# Patient Record
Sex: FEMALE | Race: BLACK OR AFRICAN AMERICAN | NOT HISPANIC OR LATINO
[De-identification: names, ages, dates, MRNs, and addresses within clinical notes are randomized per-mention and may not be internally consistent; named-entity substitution may affect disease eponyms.]

---

## 2017-01-26 ENCOUNTER — APPOINTMENT (OUTPATIENT)
Dept: CARDIOLOGY | Facility: CLINIC | Age: 82
End: 2017-01-26

## 2017-01-26 VITALS — DIASTOLIC BLOOD PRESSURE: 80 MMHG | SYSTOLIC BLOOD PRESSURE: 130 MMHG | HEART RATE: 60 BPM

## 2017-01-26 VITALS — HEIGHT: 63 IN | WEIGHT: 145 LBS | BODY MASS INDEX: 25.69 KG/M2

## 2017-03-04 ENCOUNTER — INPATIENT (INPATIENT)
Facility: HOSPITAL | Age: 82
LOS: 5 days | Discharge: NURSING, REHAB OR LONG-TERM CARE | End: 2017-03-10
Attending: INTERNAL MEDICINE | Admitting: INTERNAL MEDICINE

## 2017-04-24 ENCOUNTER — APPOINTMENT (OUTPATIENT)
Dept: CARDIOLOGY | Facility: CLINIC | Age: 82
End: 2017-04-24

## 2017-04-24 VITALS — DIASTOLIC BLOOD PRESSURE: 80 MMHG | SYSTOLIC BLOOD PRESSURE: 140 MMHG | HEART RATE: 64 BPM

## 2017-04-27 ENCOUNTER — EMERGENCY (EMERGENCY)
Facility: HOSPITAL | Age: 82
LOS: 0 days | Discharge: HOME | End: 2017-04-27
Admitting: INTERNAL MEDICINE

## 2017-06-24 ENCOUNTER — INPATIENT (INPATIENT)
Facility: HOSPITAL | Age: 82
LOS: 2 days | Discharge: HOME | End: 2017-06-27
Attending: INTERNAL MEDICINE | Admitting: INTERNAL MEDICINE

## 2017-06-24 DIAGNOSIS — M06.9 RHEUMATOID ARTHRITIS, UNSPECIFIED: ICD-10-CM

## 2017-06-24 DIAGNOSIS — I50.9 HEART FAILURE, UNSPECIFIED: ICD-10-CM

## 2017-06-24 DIAGNOSIS — N18.4 CHRONIC KIDNEY DISEASE, STAGE 4 (SEVERE): ICD-10-CM

## 2017-06-24 DIAGNOSIS — I10 ESSENTIAL (PRIMARY) HYPERTENSION: ICD-10-CM

## 2017-06-24 DIAGNOSIS — I83.90 ASYMPTOMATIC VARICOSE VEINS OF UNSPECIFIED LOWER EXTREMITY: ICD-10-CM

## 2017-06-24 DIAGNOSIS — R00.1 BRADYCARDIA, UNSPECIFIED: ICD-10-CM

## 2017-06-24 DIAGNOSIS — D64.9 ANEMIA, UNSPECIFIED: ICD-10-CM

## 2017-06-24 DIAGNOSIS — N17.9 ACUTE KIDNEY FAILURE, UNSPECIFIED: ICD-10-CM

## 2017-06-24 DIAGNOSIS — R42 DIZZINESS AND GIDDINESS: ICD-10-CM

## 2017-06-24 DIAGNOSIS — E87.5 HYPERKALEMIA: ICD-10-CM

## 2017-06-28 DIAGNOSIS — M54.5 LOW BACK PAIN: ICD-10-CM

## 2017-06-28 DIAGNOSIS — E78.00 PURE HYPERCHOLESTEROLEMIA, UNSPECIFIED: ICD-10-CM

## 2017-06-28 DIAGNOSIS — Z98.890 OTHER SPECIFIED POSTPROCEDURAL STATES: ICD-10-CM

## 2017-06-28 DIAGNOSIS — Z90.710 ACQUIRED ABSENCE OF BOTH CERVIX AND UTERUS: ICD-10-CM

## 2017-06-28 DIAGNOSIS — Z95.0 PRESENCE OF CARDIAC PACEMAKER: ICD-10-CM

## 2017-06-28 DIAGNOSIS — Z90.49 ACQUIRED ABSENCE OF OTHER SPECIFIED PARTS OF DIGESTIVE TRACT: ICD-10-CM

## 2017-06-28 DIAGNOSIS — M17.0 BILATERAL PRIMARY OSTEOARTHRITIS OF KNEE: ICD-10-CM

## 2017-06-28 DIAGNOSIS — M16.0 BILATERAL PRIMARY OSTEOARTHRITIS OF HIP: ICD-10-CM

## 2017-06-28 DIAGNOSIS — I10 ESSENTIAL (PRIMARY) HYPERTENSION: ICD-10-CM

## 2017-06-28 DIAGNOSIS — G89.29 OTHER CHRONIC PAIN: ICD-10-CM

## 2017-06-28 DIAGNOSIS — K21.9 GASTRO-ESOPHAGEAL REFLUX DISEASE WITHOUT ESOPHAGITIS: ICD-10-CM

## 2017-06-28 DIAGNOSIS — Z90.89 ACQUIRED ABSENCE OF OTHER ORGANS: ICD-10-CM

## 2017-06-28 DIAGNOSIS — Z79.899 OTHER LONG TERM (CURRENT) DRUG THERAPY: ICD-10-CM

## 2017-06-30 DIAGNOSIS — M94.0 CHONDROCOSTAL JUNCTION SYNDROME [TIETZE]: ICD-10-CM

## 2017-06-30 DIAGNOSIS — Z90.49 ACQUIRED ABSENCE OF OTHER SPECIFIED PARTS OF DIGESTIVE TRACT: ICD-10-CM

## 2017-06-30 DIAGNOSIS — Z90.710 ACQUIRED ABSENCE OF BOTH CERVIX AND UTERUS: ICD-10-CM

## 2017-06-30 DIAGNOSIS — I48.91 UNSPECIFIED ATRIAL FIBRILLATION: ICD-10-CM

## 2017-06-30 DIAGNOSIS — I50.22 CHRONIC SYSTOLIC (CONGESTIVE) HEART FAILURE: ICD-10-CM

## 2017-06-30 DIAGNOSIS — R07.89 OTHER CHEST PAIN: ICD-10-CM

## 2017-06-30 DIAGNOSIS — N18.4 CHRONIC KIDNEY DISEASE, STAGE 4 (SEVERE): ICD-10-CM

## 2017-06-30 DIAGNOSIS — M06.9 RHEUMATOID ARTHRITIS, UNSPECIFIED: ICD-10-CM

## 2017-06-30 DIAGNOSIS — E78.5 HYPERLIPIDEMIA, UNSPECIFIED: ICD-10-CM

## 2017-06-30 DIAGNOSIS — I42.8 OTHER CARDIOMYOPATHIES: ICD-10-CM

## 2017-06-30 DIAGNOSIS — E78.00 PURE HYPERCHOLESTEROLEMIA, UNSPECIFIED: ICD-10-CM

## 2017-06-30 DIAGNOSIS — J20.9 ACUTE BRONCHITIS, UNSPECIFIED: ICD-10-CM

## 2017-06-30 DIAGNOSIS — Z95.810 PRESENCE OF AUTOMATIC (IMPLANTABLE) CARDIAC DEFIBRILLATOR: ICD-10-CM

## 2017-06-30 DIAGNOSIS — K21.9 GASTRO-ESOPHAGEAL REFLUX DISEASE WITHOUT ESOPHAGITIS: ICD-10-CM

## 2017-06-30 DIAGNOSIS — F32.9 MAJOR DEPRESSIVE DISORDER, SINGLE EPISODE, UNSPECIFIED: ICD-10-CM

## 2017-06-30 DIAGNOSIS — I13.0 HYPERTENSIVE HEART AND CHRONIC KIDNEY DISEASE WITH HEART FAILURE AND STAGE 1 THROUGH STAGE 4 CHRONIC KIDNEY DISEASE, OR UNSPECIFIED CHRONIC KIDNEY DISEASE: ICD-10-CM

## 2017-08-02 ENCOUNTER — INPATIENT (INPATIENT)
Facility: HOSPITAL | Age: 82
LOS: 2 days | Discharge: HOME | End: 2017-08-05
Attending: INTERNAL MEDICINE | Admitting: INTERNAL MEDICINE

## 2017-08-02 DIAGNOSIS — T82.118A BREAKDOWN (MECHANICAL) OF OTHER CARDIAC ELECTRONIC DEVICE, INITIAL ENCOUNTER: ICD-10-CM

## 2017-08-02 DIAGNOSIS — I50.33 ACUTE ON CHRONIC DIASTOLIC (CONGESTIVE) HEART FAILURE: ICD-10-CM

## 2017-08-02 DIAGNOSIS — R00.1 BRADYCARDIA, UNSPECIFIED: ICD-10-CM

## 2017-08-02 DIAGNOSIS — M06.9 RHEUMATOID ARTHRITIS, UNSPECIFIED: ICD-10-CM

## 2017-08-02 DIAGNOSIS — I13.0 HYPERTENSIVE HEART AND CHRONIC KIDNEY DISEASE WITH HEART FAILURE AND STAGE 1 THROUGH STAGE 4 CHRONIC KIDNEY DISEASE, OR UNSPECIFIED CHRONIC KIDNEY DISEASE: ICD-10-CM

## 2017-08-02 DIAGNOSIS — D64.9 ANEMIA, UNSPECIFIED: ICD-10-CM

## 2017-08-02 DIAGNOSIS — E87.5 HYPERKALEMIA: ICD-10-CM

## 2017-08-02 DIAGNOSIS — R42 DIZZINESS AND GIDDINESS: ICD-10-CM

## 2017-08-02 DIAGNOSIS — I83.90 ASYMPTOMATIC VARICOSE VEINS OF UNSPECIFIED LOWER EXTREMITY: ICD-10-CM

## 2017-08-02 DIAGNOSIS — F32.9 MAJOR DEPRESSIVE DISORDER, SINGLE EPISODE, UNSPECIFIED: ICD-10-CM

## 2017-08-02 DIAGNOSIS — I50.9 HEART FAILURE, UNSPECIFIED: ICD-10-CM

## 2017-08-02 DIAGNOSIS — N18.4 CHRONIC KIDNEY DISEASE, STAGE 4 (SEVERE): ICD-10-CM

## 2017-08-02 DIAGNOSIS — N17.9 ACUTE KIDNEY FAILURE, UNSPECIFIED: ICD-10-CM

## 2017-08-02 DIAGNOSIS — Z90.49 ACQUIRED ABSENCE OF OTHER SPECIFIED PARTS OF DIGESTIVE TRACT: ICD-10-CM

## 2017-08-02 DIAGNOSIS — Z90.710 ACQUIRED ABSENCE OF BOTH CERVIX AND UTERUS: ICD-10-CM

## 2017-08-02 DIAGNOSIS — I10 ESSENTIAL (PRIMARY) HYPERTENSION: ICD-10-CM

## 2017-08-02 DIAGNOSIS — I42.9 CARDIOMYOPATHY, UNSPECIFIED: ICD-10-CM

## 2017-08-02 DIAGNOSIS — M10.9 GOUT, UNSPECIFIED: ICD-10-CM

## 2017-08-02 DIAGNOSIS — E78.5 HYPERLIPIDEMIA, UNSPECIFIED: ICD-10-CM

## 2017-08-09 DIAGNOSIS — Z91.81 HISTORY OF FALLING: ICD-10-CM

## 2017-08-09 DIAGNOSIS — N17.9 ACUTE KIDNEY FAILURE, UNSPECIFIED: ICD-10-CM

## 2017-08-09 DIAGNOSIS — I51.4 MYOCARDITIS, UNSPECIFIED: ICD-10-CM

## 2017-08-09 DIAGNOSIS — K21.9 GASTRO-ESOPHAGEAL REFLUX DISEASE WITHOUT ESOPHAGITIS: ICD-10-CM

## 2017-08-09 DIAGNOSIS — I13.0 HYPERTENSIVE HEART AND CHRONIC KIDNEY DISEASE WITH HEART FAILURE AND STAGE 1 THROUGH STAGE 4 CHRONIC KIDNEY DISEASE, OR UNSPECIFIED CHRONIC KIDNEY DISEASE: ICD-10-CM

## 2017-08-09 DIAGNOSIS — R19.7 DIARRHEA, UNSPECIFIED: ICD-10-CM

## 2017-08-09 DIAGNOSIS — M10.9 GOUT, UNSPECIFIED: ICD-10-CM

## 2017-08-09 DIAGNOSIS — Z90.49 ACQUIRED ABSENCE OF OTHER SPECIFIED PARTS OF DIGESTIVE TRACT: ICD-10-CM

## 2017-08-09 DIAGNOSIS — K80.20 CALCULUS OF GALLBLADDER WITHOUT CHOLECYSTITIS WITHOUT OBSTRUCTION: ICD-10-CM

## 2017-08-09 DIAGNOSIS — J98.11 ATELECTASIS: ICD-10-CM

## 2017-08-09 DIAGNOSIS — Z90.710 ACQUIRED ABSENCE OF BOTH CERVIX AND UTERUS: ICD-10-CM

## 2017-08-09 DIAGNOSIS — I50.9 HEART FAILURE, UNSPECIFIED: ICD-10-CM

## 2017-08-09 DIAGNOSIS — N18.4 CHRONIC KIDNEY DISEASE, STAGE 4 (SEVERE): ICD-10-CM

## 2017-08-09 DIAGNOSIS — E78.00 PURE HYPERCHOLESTEROLEMIA, UNSPECIFIED: ICD-10-CM

## 2017-08-09 DIAGNOSIS — D64.9 ANEMIA, UNSPECIFIED: ICD-10-CM

## 2017-08-09 DIAGNOSIS — Z95.810 PRESENCE OF AUTOMATIC (IMPLANTABLE) CARDIAC DEFIBRILLATOR: ICD-10-CM

## 2017-08-09 DIAGNOSIS — E87.5 HYPERKALEMIA: ICD-10-CM

## 2017-08-09 DIAGNOSIS — E78.5 HYPERLIPIDEMIA, UNSPECIFIED: ICD-10-CM

## 2017-08-09 DIAGNOSIS — M06.9 RHEUMATOID ARTHRITIS, UNSPECIFIED: ICD-10-CM

## 2017-08-09 DIAGNOSIS — I42.9 CARDIOMYOPATHY, UNSPECIFIED: ICD-10-CM

## 2017-10-07 ENCOUNTER — INPATIENT (INPATIENT)
Facility: HOSPITAL | Age: 82
LOS: 3 days | Discharge: HOME | End: 2017-10-11
Attending: INTERNAL MEDICINE | Admitting: INTERNAL MEDICINE

## 2017-10-07 DIAGNOSIS — M06.9 RHEUMATOID ARTHRITIS, UNSPECIFIED: ICD-10-CM

## 2017-10-07 DIAGNOSIS — N18.4 CHRONIC KIDNEY DISEASE, STAGE 4 (SEVERE): ICD-10-CM

## 2017-10-07 DIAGNOSIS — I83.90 ASYMPTOMATIC VARICOSE VEINS OF UNSPECIFIED LOWER EXTREMITY: ICD-10-CM

## 2017-10-07 DIAGNOSIS — E87.5 HYPERKALEMIA: ICD-10-CM

## 2017-10-07 DIAGNOSIS — I50.33 ACUTE ON CHRONIC DIASTOLIC (CONGESTIVE) HEART FAILURE: ICD-10-CM

## 2017-10-07 DIAGNOSIS — R42 DIZZINESS AND GIDDINESS: ICD-10-CM

## 2017-10-07 DIAGNOSIS — N17.9 ACUTE KIDNEY FAILURE, UNSPECIFIED: ICD-10-CM

## 2017-10-07 DIAGNOSIS — R00.1 BRADYCARDIA, UNSPECIFIED: ICD-10-CM

## 2017-10-07 DIAGNOSIS — I50.9 HEART FAILURE, UNSPECIFIED: ICD-10-CM

## 2017-10-07 DIAGNOSIS — D64.9 ANEMIA, UNSPECIFIED: ICD-10-CM

## 2017-10-07 DIAGNOSIS — T82.118A BREAKDOWN (MECHANICAL) OF OTHER CARDIAC ELECTRONIC DEVICE, INITIAL ENCOUNTER: ICD-10-CM

## 2017-10-07 DIAGNOSIS — I10 ESSENTIAL (PRIMARY) HYPERTENSION: ICD-10-CM

## 2017-10-12 ENCOUNTER — APPOINTMENT (OUTPATIENT)
Dept: CARDIOLOGY | Facility: CLINIC | Age: 82
End: 2017-10-12

## 2017-10-12 VITALS — HEIGHT: 63 IN | BODY MASS INDEX: 25.69 KG/M2 | WEIGHT: 145 LBS

## 2017-10-12 DIAGNOSIS — K27.9 PEPTIC ULCER, SITE UNSPECIFIED, UNSPECIFIED AS ACUTE OR CHRONIC, W/OUT HEMORRHAGE OR PERFORATION: ICD-10-CM

## 2017-10-13 DIAGNOSIS — Z95.810 PRESENCE OF AUTOMATIC (IMPLANTABLE) CARDIAC DEFIBRILLATOR: ICD-10-CM

## 2017-10-13 DIAGNOSIS — Z88.8 ALLERGY STATUS TO OTHER DRUGS, MEDICAMENTS AND BIOLOGICAL SUBSTANCES: ICD-10-CM

## 2017-10-13 DIAGNOSIS — Z86.718 PERSONAL HISTORY OF OTHER VENOUS THROMBOSIS AND EMBOLISM: ICD-10-CM

## 2017-10-13 DIAGNOSIS — M06.9 RHEUMATOID ARTHRITIS, UNSPECIFIED: ICD-10-CM

## 2017-10-13 DIAGNOSIS — E78.5 HYPERLIPIDEMIA, UNSPECIFIED: ICD-10-CM

## 2017-10-13 DIAGNOSIS — M10.9 GOUT, UNSPECIFIED: ICD-10-CM

## 2017-10-13 DIAGNOSIS — N18.4 CHRONIC KIDNEY DISEASE, STAGE 4 (SEVERE): ICD-10-CM

## 2017-10-13 DIAGNOSIS — Z79.01 LONG TERM (CURRENT) USE OF ANTICOAGULANTS: ICD-10-CM

## 2017-10-13 DIAGNOSIS — F32.9 MAJOR DEPRESSIVE DISORDER, SINGLE EPISODE, UNSPECIFIED: ICD-10-CM

## 2017-10-13 DIAGNOSIS — I25.10 ATHEROSCLEROTIC HEART DISEASE OF NATIVE CORONARY ARTERY WITHOUT ANGINA PECTORIS: ICD-10-CM

## 2017-10-13 DIAGNOSIS — D64.9 ANEMIA, UNSPECIFIED: ICD-10-CM

## 2017-10-13 DIAGNOSIS — I50.21 ACUTE SYSTOLIC (CONGESTIVE) HEART FAILURE: ICD-10-CM

## 2017-10-13 DIAGNOSIS — I50.23 ACUTE ON CHRONIC SYSTOLIC (CONGESTIVE) HEART FAILURE: ICD-10-CM

## 2017-10-13 DIAGNOSIS — I25.5 ISCHEMIC CARDIOMYOPATHY: ICD-10-CM

## 2017-10-13 DIAGNOSIS — I13.0 HYPERTENSIVE HEART AND CHRONIC KIDNEY DISEASE WITH HEART FAILURE AND STAGE 1 THROUGH STAGE 4 CHRONIC KIDNEY DISEASE, OR UNSPECIFIED CHRONIC KIDNEY DISEASE: ICD-10-CM

## 2017-12-10 ENCOUNTER — INPATIENT (INPATIENT)
Facility: HOSPITAL | Age: 82
LOS: 2 days | Discharge: HOME | End: 2017-12-13
Attending: INTERNAL MEDICINE | Admitting: INTERNAL MEDICINE

## 2017-12-10 DIAGNOSIS — D64.9 ANEMIA, UNSPECIFIED: ICD-10-CM

## 2017-12-10 DIAGNOSIS — I83.90 ASYMPTOMATIC VARICOSE VEINS OF UNSPECIFIED LOWER EXTREMITY: ICD-10-CM

## 2017-12-10 DIAGNOSIS — M94.0 CHONDROCOSTAL JUNCTION SYNDROME [TIETZE]: ICD-10-CM

## 2017-12-10 DIAGNOSIS — I50.33 ACUTE ON CHRONIC DIASTOLIC (CONGESTIVE) HEART FAILURE: ICD-10-CM

## 2017-12-10 DIAGNOSIS — E87.5 HYPERKALEMIA: ICD-10-CM

## 2017-12-10 DIAGNOSIS — R42 DIZZINESS AND GIDDINESS: ICD-10-CM

## 2017-12-10 DIAGNOSIS — N18.4 CHRONIC KIDNEY DISEASE, STAGE 4 (SEVERE): ICD-10-CM

## 2017-12-10 DIAGNOSIS — T82.118A BREAKDOWN (MECHANICAL) OF OTHER CARDIAC ELECTRONIC DEVICE, INITIAL ENCOUNTER: ICD-10-CM

## 2017-12-10 DIAGNOSIS — I10 ESSENTIAL (PRIMARY) HYPERTENSION: ICD-10-CM

## 2017-12-10 DIAGNOSIS — M06.9 RHEUMATOID ARTHRITIS, UNSPECIFIED: ICD-10-CM

## 2017-12-10 DIAGNOSIS — I50.9 HEART FAILURE, UNSPECIFIED: ICD-10-CM

## 2017-12-10 DIAGNOSIS — N17.9 ACUTE KIDNEY FAILURE, UNSPECIFIED: ICD-10-CM

## 2017-12-10 DIAGNOSIS — R00.1 BRADYCARDIA, UNSPECIFIED: ICD-10-CM

## 2017-12-13 ENCOUNTER — APPOINTMENT (OUTPATIENT)
Dept: CARDIOLOGY | Facility: CLINIC | Age: 82
End: 2017-12-13

## 2017-12-19 DIAGNOSIS — Z90.710 ACQUIRED ABSENCE OF BOTH CERVIX AND UTERUS: ICD-10-CM

## 2017-12-19 DIAGNOSIS — F32.9 MAJOR DEPRESSIVE DISORDER, SINGLE EPISODE, UNSPECIFIED: ICD-10-CM

## 2017-12-19 DIAGNOSIS — M10.9 GOUT, UNSPECIFIED: ICD-10-CM

## 2017-12-19 DIAGNOSIS — I42.8 OTHER CARDIOMYOPATHIES: ICD-10-CM

## 2017-12-19 DIAGNOSIS — Z86.718 PERSONAL HISTORY OF OTHER VENOUS THROMBOSIS AND EMBOLISM: ICD-10-CM

## 2017-12-19 DIAGNOSIS — I25.10 ATHEROSCLEROTIC HEART DISEASE OF NATIVE CORONARY ARTERY WITHOUT ANGINA PECTORIS: ICD-10-CM

## 2017-12-19 DIAGNOSIS — D64.9 ANEMIA, UNSPECIFIED: ICD-10-CM

## 2017-12-19 DIAGNOSIS — N18.4 CHRONIC KIDNEY DISEASE, STAGE 4 (SEVERE): ICD-10-CM

## 2017-12-19 DIAGNOSIS — I50.22 CHRONIC SYSTOLIC (CONGESTIVE) HEART FAILURE: ICD-10-CM

## 2017-12-19 DIAGNOSIS — K21.9 GASTRO-ESOPHAGEAL REFLUX DISEASE WITHOUT ESOPHAGITIS: ICD-10-CM

## 2017-12-19 DIAGNOSIS — M06.9 RHEUMATOID ARTHRITIS, UNSPECIFIED: ICD-10-CM

## 2017-12-19 DIAGNOSIS — I13.0 HYPERTENSIVE HEART AND CHRONIC KIDNEY DISEASE WITH HEART FAILURE AND STAGE 1 THROUGH STAGE 4 CHRONIC KIDNEY DISEASE, OR UNSPECIFIED CHRONIC KIDNEY DISEASE: ICD-10-CM

## 2017-12-19 DIAGNOSIS — E78.5 HYPERLIPIDEMIA, UNSPECIFIED: ICD-10-CM

## 2017-12-19 DIAGNOSIS — K57.92 DIVERTICULITIS OF INTESTINE, PART UNSPECIFIED, WITHOUT PERFORATION OR ABSCESS WITHOUT BLEEDING: ICD-10-CM

## 2017-12-19 DIAGNOSIS — Z88.1 ALLERGY STATUS TO OTHER ANTIBIOTIC AGENTS STATUS: ICD-10-CM

## 2017-12-19 DIAGNOSIS — N30.90 CYSTITIS, UNSPECIFIED WITHOUT HEMATURIA: ICD-10-CM

## 2018-01-10 ENCOUNTER — EMERGENCY (EMERGENCY)
Facility: HOSPITAL | Age: 83
LOS: 0 days | Discharge: HOME | End: 2018-01-11
Admitting: INTERNAL MEDICINE

## 2018-01-10 DIAGNOSIS — D64.9 ANEMIA, UNSPECIFIED: ICD-10-CM

## 2018-01-10 DIAGNOSIS — Z90.89 ACQUIRED ABSENCE OF OTHER ORGANS: ICD-10-CM

## 2018-01-10 DIAGNOSIS — I11.9 HYPERTENSIVE HEART DISEASE WITHOUT HEART FAILURE: ICD-10-CM

## 2018-01-10 DIAGNOSIS — M25.562 PAIN IN LEFT KNEE: ICD-10-CM

## 2018-01-10 DIAGNOSIS — Z95.0 PRESENCE OF CARDIAC PACEMAKER: ICD-10-CM

## 2018-01-10 DIAGNOSIS — I50.9 HEART FAILURE, UNSPECIFIED: ICD-10-CM

## 2018-01-10 DIAGNOSIS — E78.00 PURE HYPERCHOLESTEROLEMIA, UNSPECIFIED: ICD-10-CM

## 2018-01-10 DIAGNOSIS — K21.9 GASTRO-ESOPHAGEAL REFLUX DISEASE WITHOUT ESOPHAGITIS: ICD-10-CM

## 2018-01-10 DIAGNOSIS — R42 DIZZINESS AND GIDDINESS: ICD-10-CM

## 2018-01-10 DIAGNOSIS — Z98.890 OTHER SPECIFIED POSTPROCEDURAL STATES: ICD-10-CM

## 2018-01-10 DIAGNOSIS — Z86.718 PERSONAL HISTORY OF OTHER VENOUS THROMBOSIS AND EMBOLISM: ICD-10-CM

## 2018-01-10 DIAGNOSIS — N18.4 CHRONIC KIDNEY DISEASE, STAGE 4 (SEVERE): ICD-10-CM

## 2018-01-10 DIAGNOSIS — T82.118A BREAKDOWN (MECHANICAL) OF OTHER CARDIAC ELECTRONIC DEVICE, INITIAL ENCOUNTER: ICD-10-CM

## 2018-01-10 DIAGNOSIS — I51.9 HEART DISEASE, UNSPECIFIED: ICD-10-CM

## 2018-01-10 DIAGNOSIS — M06.9 RHEUMATOID ARTHRITIS, UNSPECIFIED: ICD-10-CM

## 2018-01-10 DIAGNOSIS — M79.605 PAIN IN LEFT LEG: ICD-10-CM

## 2018-01-10 DIAGNOSIS — Z90.49 ACQUIRED ABSENCE OF OTHER SPECIFIED PARTS OF DIGESTIVE TRACT: ICD-10-CM

## 2018-01-10 DIAGNOSIS — I50.33 ACUTE ON CHRONIC DIASTOLIC (CONGESTIVE) HEART FAILURE: ICD-10-CM

## 2018-01-10 DIAGNOSIS — R00.1 BRADYCARDIA, UNSPECIFIED: ICD-10-CM

## 2018-01-10 DIAGNOSIS — N17.9 ACUTE KIDNEY FAILURE, UNSPECIFIED: ICD-10-CM

## 2018-01-10 DIAGNOSIS — I10 ESSENTIAL (PRIMARY) HYPERTENSION: ICD-10-CM

## 2018-01-10 DIAGNOSIS — Z90.710 ACQUIRED ABSENCE OF BOTH CERVIX AND UTERUS: ICD-10-CM

## 2018-01-10 DIAGNOSIS — E87.5 HYPERKALEMIA: ICD-10-CM

## 2018-01-10 DIAGNOSIS — I83.90 ASYMPTOMATIC VARICOSE VEINS OF UNSPECIFIED LOWER EXTREMITY: ICD-10-CM

## 2018-01-30 ENCOUNTER — EMERGENCY (EMERGENCY)
Facility: HOSPITAL | Age: 83
LOS: 0 days | Discharge: HOME | End: 2018-01-30
Admitting: INTERNAL MEDICINE

## 2018-01-30 DIAGNOSIS — I83.90 ASYMPTOMATIC VARICOSE VEINS OF UNSPECIFIED LOWER EXTREMITY: ICD-10-CM

## 2018-01-30 DIAGNOSIS — R00.1 BRADYCARDIA, UNSPECIFIED: ICD-10-CM

## 2018-01-30 DIAGNOSIS — R51 HEADACHE: ICD-10-CM

## 2018-01-30 DIAGNOSIS — N18.4 CHRONIC KIDNEY DISEASE, STAGE 4 (SEVERE): ICD-10-CM

## 2018-01-30 DIAGNOSIS — E87.5 HYPERKALEMIA: ICD-10-CM

## 2018-01-30 DIAGNOSIS — Z90.710 ACQUIRED ABSENCE OF BOTH CERVIX AND UTERUS: ICD-10-CM

## 2018-01-30 DIAGNOSIS — D64.9 ANEMIA, UNSPECIFIED: ICD-10-CM

## 2018-01-30 DIAGNOSIS — R42 DIZZINESS AND GIDDINESS: ICD-10-CM

## 2018-01-30 DIAGNOSIS — E86.0 DEHYDRATION: ICD-10-CM

## 2018-01-30 DIAGNOSIS — M06.9 RHEUMATOID ARTHRITIS, UNSPECIFIED: ICD-10-CM

## 2018-01-30 DIAGNOSIS — I50.9 HEART FAILURE, UNSPECIFIED: ICD-10-CM

## 2018-01-30 DIAGNOSIS — E78.00 PURE HYPERCHOLESTEROLEMIA, UNSPECIFIED: ICD-10-CM

## 2018-01-30 DIAGNOSIS — Z90.49 ACQUIRED ABSENCE OF OTHER SPECIFIED PARTS OF DIGESTIVE TRACT: ICD-10-CM

## 2018-01-30 DIAGNOSIS — N18.9 CHRONIC KIDNEY DISEASE, UNSPECIFIED: ICD-10-CM

## 2018-01-30 DIAGNOSIS — I50.33 ACUTE ON CHRONIC DIASTOLIC (CONGESTIVE) HEART FAILURE: ICD-10-CM

## 2018-01-30 DIAGNOSIS — R11.0 NAUSEA: ICD-10-CM

## 2018-01-30 DIAGNOSIS — Z95.0 PRESENCE OF CARDIAC PACEMAKER: ICD-10-CM

## 2018-01-30 DIAGNOSIS — I13.0 HYPERTENSIVE HEART AND CHRONIC KIDNEY DISEASE WITH HEART FAILURE AND STAGE 1 THROUGH STAGE 4 CHRONIC KIDNEY DISEASE, OR UNSPECIFIED CHRONIC KIDNEY DISEASE: ICD-10-CM

## 2018-01-30 DIAGNOSIS — I10 ESSENTIAL (PRIMARY) HYPERTENSION: ICD-10-CM

## 2018-01-30 DIAGNOSIS — R53.1 WEAKNESS: ICD-10-CM

## 2018-01-30 DIAGNOSIS — N17.9 ACUTE KIDNEY FAILURE, UNSPECIFIED: ICD-10-CM

## 2018-01-30 DIAGNOSIS — T82.118A BREAKDOWN (MECHANICAL) OF OTHER CARDIAC ELECTRONIC DEVICE, INITIAL ENCOUNTER: ICD-10-CM

## 2018-05-21 ENCOUNTER — APPOINTMENT (OUTPATIENT)
Dept: CARDIOLOGY | Facility: CLINIC | Age: 83
End: 2018-05-21

## 2018-05-21 VITALS
WEIGHT: 134 LBS | HEIGHT: 63 IN | BODY MASS INDEX: 23.74 KG/M2 | SYSTOLIC BLOOD PRESSURE: 160 MMHG | HEART RATE: 59 BPM | DIASTOLIC BLOOD PRESSURE: 80 MMHG

## 2018-05-21 RX ORDER — ISOSORBIDE DINITRATE 20 MG/1
20 TABLET ORAL
Refills: 0 | Status: DISCONTINUED | COMMUNITY
End: 2018-05-21

## 2018-05-21 RX ORDER — LIDOCAINE 5 G/100G
5 OINTMENT TOPICAL
Qty: 354 | Refills: 0 | Status: DISCONTINUED | COMMUNITY
Start: 2016-12-21 | End: 2018-05-21

## 2018-05-23 ENCOUNTER — MEDICATION RENEWAL (OUTPATIENT)
Age: 83
End: 2018-05-23

## 2018-05-23 ENCOUNTER — RX RENEWAL (OUTPATIENT)
Age: 83
End: 2018-05-23

## 2018-06-07 ENCOUNTER — APPOINTMENT (OUTPATIENT)
Dept: CARDIOLOGY | Facility: CLINIC | Age: 83
End: 2018-06-07

## 2018-07-17 ENCOUNTER — APPOINTMENT (OUTPATIENT)
Dept: CARDIOLOGY | Facility: CLINIC | Age: 83
End: 2018-07-17

## 2018-07-17 VITALS
WEIGHT: 139 LBS | HEIGHT: 63 IN | DIASTOLIC BLOOD PRESSURE: 80 MMHG | SYSTOLIC BLOOD PRESSURE: 140 MMHG | HEART RATE: 60 BPM | BODY MASS INDEX: 24.63 KG/M2

## 2018-07-30 ENCOUNTER — RX RENEWAL (OUTPATIENT)
Age: 83
End: 2018-07-30

## 2018-07-30 ENCOUNTER — RECORD ABSTRACTING (OUTPATIENT)
Age: 83
End: 2018-07-30

## 2018-07-30 ENCOUNTER — APPOINTMENT (OUTPATIENT)
Dept: VASCULAR SURGERY | Facility: CLINIC | Age: 83
End: 2018-07-30
Payer: MEDICARE

## 2018-07-30 VITALS
DIASTOLIC BLOOD PRESSURE: 80 MMHG | WEIGHT: 149 LBS | HEIGHT: 63 IN | SYSTOLIC BLOOD PRESSURE: 130 MMHG | BODY MASS INDEX: 26.4 KG/M2

## 2018-07-30 PROCEDURE — 93970 EXTREMITY STUDY: CPT

## 2018-07-30 PROCEDURE — 99203 OFFICE O/P NEW LOW 30 MIN: CPT

## 2018-09-16 ENCOUNTER — EMERGENCY (EMERGENCY)
Facility: HOSPITAL | Age: 83
LOS: 0 days | Discharge: HOME | End: 2018-09-16
Attending: EMERGENCY MEDICINE | Admitting: STUDENT IN AN ORGANIZED HEALTH CARE EDUCATION/TRAINING PROGRAM

## 2018-09-16 VITALS
SYSTOLIC BLOOD PRESSURE: 122 MMHG | OXYGEN SATURATION: 99 % | DIASTOLIC BLOOD PRESSURE: 67 MMHG | TEMPERATURE: 99 F | HEART RATE: 60 BPM | RESPIRATION RATE: 18 BRPM

## 2018-09-16 VITALS
OXYGEN SATURATION: 98 % | SYSTOLIC BLOOD PRESSURE: 139 MMHG | RESPIRATION RATE: 18 BRPM | DIASTOLIC BLOOD PRESSURE: 70 MMHG | HEART RATE: 60 BPM | TEMPERATURE: 98 F

## 2018-09-16 DIAGNOSIS — N18.3 CHRONIC KIDNEY DISEASE, STAGE 3 (MODERATE): ICD-10-CM

## 2018-09-16 DIAGNOSIS — R00.2 PALPITATIONS: ICD-10-CM

## 2018-09-16 DIAGNOSIS — N39.0 URINARY TRACT INFECTION, SITE NOT SPECIFIED: ICD-10-CM

## 2018-09-16 DIAGNOSIS — R10.9 UNSPECIFIED ABDOMINAL PAIN: ICD-10-CM

## 2018-09-16 DIAGNOSIS — Z95.0 PRESENCE OF CARDIAC PACEMAKER: ICD-10-CM

## 2018-09-16 DIAGNOSIS — I12.9 HYPERTENSIVE CHRONIC KIDNEY DISEASE WITH STAGE 1 THROUGH STAGE 4 CHRONIC KIDNEY DISEASE, OR UNSPECIFIED CHRONIC KIDNEY DISEASE: ICD-10-CM

## 2018-09-16 LAB
ALBUMIN SERPL ELPH-MCNC: 4.2 G/DL — SIGNIFICANT CHANGE UP (ref 3.5–5.2)
ALP SERPL-CCNC: 91 U/L — SIGNIFICANT CHANGE UP (ref 30–115)
ALT FLD-CCNC: 24 U/L — SIGNIFICANT CHANGE UP (ref 0–41)
ANION GAP SERPL CALC-SCNC: 16 MMOL/L — HIGH (ref 7–14)
APPEARANCE UR: CLEAR — SIGNIFICANT CHANGE UP
AST SERPL-CCNC: 38 U/L — SIGNIFICANT CHANGE UP (ref 0–41)
BASOPHILS # BLD AUTO: 0.04 K/UL — SIGNIFICANT CHANGE UP (ref 0–0.2)
BASOPHILS NFR BLD AUTO: 0.5 % — SIGNIFICANT CHANGE UP (ref 0–1)
BILIRUB SERPL-MCNC: 0.3 MG/DL — SIGNIFICANT CHANGE UP (ref 0.2–1.2)
BILIRUB UR-MCNC: NEGATIVE — SIGNIFICANT CHANGE UP
BUN SERPL-MCNC: 45 MG/DL — HIGH (ref 10–20)
CALCIUM SERPL-MCNC: 9.1 MG/DL — SIGNIFICANT CHANGE UP (ref 8.5–10.1)
CHLORIDE SERPL-SCNC: 101 MMOL/L — SIGNIFICANT CHANGE UP (ref 98–110)
CO2 SERPL-SCNC: 21 MMOL/L — SIGNIFICANT CHANGE UP (ref 17–32)
COLOR SPEC: YELLOW — SIGNIFICANT CHANGE UP
CREAT SERPL-MCNC: 2.2 MG/DL — HIGH (ref 0.7–1.5)
DIFF PNL FLD: NEGATIVE — SIGNIFICANT CHANGE UP
EOSINOPHIL # BLD AUTO: 0.39 K/UL — SIGNIFICANT CHANGE UP (ref 0–0.7)
EOSINOPHIL NFR BLD AUTO: 5.1 % — SIGNIFICANT CHANGE UP (ref 0–8)
GAS PNL BLDV: SIGNIFICANT CHANGE UP
GLUCOSE SERPL-MCNC: 97 MG/DL — SIGNIFICANT CHANGE UP (ref 70–99)
GLUCOSE UR QL: NEGATIVE MG/DL — SIGNIFICANT CHANGE UP
HCT VFR BLD CALC: 32.8 % — LOW (ref 37–47)
HGB BLD-MCNC: 10.4 G/DL — LOW (ref 12–16)
IMM GRANULOCYTES NFR BLD AUTO: 0.3 % — SIGNIFICANT CHANGE UP (ref 0.1–0.3)
KETONES UR-MCNC: NEGATIVE — SIGNIFICANT CHANGE UP
LACTATE SERPL-SCNC: 0.5 MMOL/L — SIGNIFICANT CHANGE UP (ref 0.5–2.2)
LEUKOCYTE ESTERASE UR-ACNC: NEGATIVE — SIGNIFICANT CHANGE UP
LIDOCAIN IGE QN: 61 U/L — HIGH (ref 7–60)
LYMPHOCYTES # BLD AUTO: 2.18 K/UL — SIGNIFICANT CHANGE UP (ref 1.2–3.4)
LYMPHOCYTES # BLD AUTO: 28.4 % — SIGNIFICANT CHANGE UP (ref 20.5–51.1)
MCHC RBC-ENTMCNC: 29.2 PG — SIGNIFICANT CHANGE UP (ref 27–31)
MCHC RBC-ENTMCNC: 31.7 G/DL — LOW (ref 32–37)
MCV RBC AUTO: 92.1 FL — SIGNIFICANT CHANGE UP (ref 81–99)
MONOCYTES # BLD AUTO: 0.9 K/UL — HIGH (ref 0.1–0.6)
MONOCYTES NFR BLD AUTO: 11.7 % — HIGH (ref 1.7–9.3)
NEUTROPHILS # BLD AUTO: 4.15 K/UL — SIGNIFICANT CHANGE UP (ref 1.4–6.5)
NEUTROPHILS NFR BLD AUTO: 54 % — SIGNIFICANT CHANGE UP (ref 42.2–75.2)
NITRITE UR-MCNC: NEGATIVE — SIGNIFICANT CHANGE UP
PH UR: 6 — SIGNIFICANT CHANGE UP (ref 5–8)
PLATELET # BLD AUTO: 219 K/UL — SIGNIFICANT CHANGE UP (ref 130–400)
POTASSIUM SERPL-MCNC: 5.1 MMOL/L — HIGH (ref 3.5–5)
POTASSIUM SERPL-SCNC: 5.1 MMOL/L — HIGH (ref 3.5–5)
PROT SERPL-MCNC: 7.4 G/DL — SIGNIFICANT CHANGE UP (ref 6–8)
PROT UR-MCNC: 100 MG/DL
RBC # BLD: 3.56 M/UL — LOW (ref 4.2–5.4)
RBC # FLD: 13.8 % — SIGNIFICANT CHANGE UP (ref 11.5–14.5)
SODIUM SERPL-SCNC: 138 MMOL/L — SIGNIFICANT CHANGE UP (ref 135–146)
SP GR SPEC: 1.01 — SIGNIFICANT CHANGE UP (ref 1.01–1.03)
TROPONIN T SERPL-MCNC: <0.01 NG/ML — SIGNIFICANT CHANGE UP
UROBILINOGEN FLD QL: 0.2 MG/DL — SIGNIFICANT CHANGE UP (ref 0.2–0.2)
WBC # BLD: 7.68 K/UL — SIGNIFICANT CHANGE UP (ref 4.8–10.8)
WBC # FLD AUTO: 7.68 K/UL — SIGNIFICANT CHANGE UP (ref 4.8–10.8)

## 2018-09-16 RX ORDER — ACETAMINOPHEN 500 MG
650 TABLET ORAL ONCE
Qty: 0 | Refills: 0 | Status: COMPLETED | OUTPATIENT
Start: 2018-09-16 | End: 2018-09-16

## 2018-09-16 RX ORDER — SODIUM CHLORIDE 9 MG/ML
1000 INJECTION, SOLUTION INTRAVENOUS ONCE
Qty: 0 | Refills: 0 | Status: COMPLETED | OUTPATIENT
Start: 2018-09-16 | End: 2018-09-16

## 2018-09-16 RX ADMIN — SODIUM CHLORIDE 1000 MILLILITER(S): 9 INJECTION, SOLUTION INTRAVENOUS at 16:37

## 2018-09-16 RX ADMIN — Medication 650 MILLIGRAM(S): at 16:37

## 2018-09-16 NOTE — ED PROVIDER NOTE - MEDICAL DECISION MAKING DETAILS
82 yo F pmh of pacemaker, htn, ckd III presetns with abdominal pain. States that she has a recent UTI that was treated at home with cipro that resovled. Pt now c/o lower abdominal pain. Pt noted with unremarkable labs and CT scan. Pt still c/o dysuria. Discussed plan with  and will treat for UTI and f/u with him in office. Pt and family comfortable with plan. Pt to return to ED for any new or concerning sx.

## 2018-09-16 NOTE — ED PROVIDER NOTE - PROGRESS NOTE DETAILS
Signout received from Dr. flynn, will follow ct and reass Pt signed out by  pending CT results and dispo. On my exam, pt resting comfortably with mild suprapubic tenderness. CT results unremarkable. Awaiting callback from PMD re:dispo/plan Spoke with Dr. marion, aware of results recommends discharge patient and agrees with plan of discharing on levaquin

## 2018-09-16 NOTE — ED ADULT NURSE NOTE - NSIMPLEMENTINTERV_GEN_ALL_ED
Implemented All Universal Safety Interventions:  [place] to call system. Call bell, personal items and telephone within reach. Instruct patient to call for assistance. Room bathroom lighting operational. Non-slip footwear when patient is off stretcher. Physically safe environment: no spills, clutter or unnecessary equipment. Stretcher in lowest position, wheels locked, appropriate side rails in place.

## 2018-09-16 NOTE — ED PROVIDER NOTE - PHYSICAL EXAMINATION
CONSTITUTIONAL: Well-developed; well-nourished; in no acute distress.   SKIN: warm, dry  HEAD: Normocephalic; atraumatic.  EYES: PERRL, no conjunctival erythema  ENT: No nasal discharge; airway clear.  NECK: Supple; non tender.  CARD: S1, S2 normal; Regular rate and rhythm.   RESP: No wheezes, rales or rhonchi.  ABD: soft non distended, + tenderness in RLQ, no rebound or guarding, + right cva tenderness.   EXT: Normal ROM.    LYMPH: No acute cervical adenopathy.  NEURO: Alert, oriented, grossly unremarkable

## 2018-09-16 NOTE — ED PROVIDER NOTE - ATTENDING CONTRIBUTION TO CARE
84 y/o F pmh above, p/w lower abd pain, "my bladder hurts when I have to go", dysuria and back discomfort.  Was dx'ed w/ UTI (had simialr sx), took 10d course cipro, finished about 1wk ago.  Sx improved, bt not resolved. Ceph allergic.  No fever, v/d. PMD Booker.    CONSTITUTIONAL: NAD  SKIN: Warm dry  HEAD: NCAT  EYES: NL inspection  ENT: MMM  NECK: Supple; non tender.  CARD: RRR  RESP: CTAB  ABD: S/NT no R/G  BACK: No cvat  EXT: no pedal edema  NEURO: Grossly unremarkable  PSYCH: Cooperative, appropriate.    IMP: UTI/ Pyelo.  P: labs, ua, CT abd, analgesia, reassess

## 2018-09-16 NOTE — ED PROVIDER NOTE - NS ED ROS FT
Eyes:  No visual changes, eye pain or discharge.  ENMT:   no sore throat or runny nose  Cardiac:  No chest pain, SOB   Respiratory:  No cough or respiratory distress.  GI:  No nausea, vomiting, diarrhea + abdominal pain, cramping, radiating to the right flank, pain is 9/10, intermittent in RLQ>   :  + urinary burning and frequency, recent treated UTI  MS:  No myalgia, muscle weakness, joint pain or back pain.  Neuro:  No headache or weakness.  No LOC.  Skin:  No skin rash.   Endocrine: No history of thyroid disease or diabetes.

## 2018-09-16 NOTE — ED PROVIDER NOTE - OBJECTIVE STATEMENT
82 yo F pmh of pacemaker, htn, ckd III presetns with abdominal pain. States that she has a recent UTI that was treated at home with cipro that resovled. 3 days ago she started experiencing lower abdominal pain, cramping radiating to her right side and right flank. no n/v/d, no fevers. + urinary frequency and burning. Also resports occasional palpitations, no cp, no sob, no headache, no dizziness. pmd is Dr. Sloan.

## 2018-09-16 NOTE — ED ADULT NURSE NOTE - OBJECTIVE STATEMENT
Pt complaining of RLQ  and flank pain and cramping x 3 days. denies n and v. also complaining of palpitations

## 2018-09-18 LAB
CULTURE RESULTS: SIGNIFICANT CHANGE UP
SPECIMEN SOURCE: SIGNIFICANT CHANGE UP

## 2018-11-04 ENCOUNTER — INPATIENT (INPATIENT)
Facility: HOSPITAL | Age: 83
LOS: 15 days | Discharge: NURSING, REHAB OR LONG-TERM CARE | End: 2018-11-20
Attending: INTERNAL MEDICINE | Admitting: INTERNAL MEDICINE
Payer: COMMERCIAL

## 2018-11-04 VITALS
HEART RATE: 68 BPM | SYSTOLIC BLOOD PRESSURE: 163 MMHG | RESPIRATION RATE: 17 BRPM | DIASTOLIC BLOOD PRESSURE: 74 MMHG | TEMPERATURE: 98 F | OXYGEN SATURATION: 100 %

## 2018-11-04 DIAGNOSIS — Z95.0 PRESENCE OF CARDIAC PACEMAKER: Chronic | ICD-10-CM

## 2018-11-04 PROBLEM — I10 ESSENTIAL (PRIMARY) HYPERTENSION: Chronic | Status: ACTIVE | Noted: 2018-09-16

## 2018-11-04 LAB
ALBUMIN SERPL ELPH-MCNC: 4.1 G/DL — SIGNIFICANT CHANGE UP (ref 3.5–5.2)
ALP SERPL-CCNC: 99 U/L — SIGNIFICANT CHANGE UP (ref 30–115)
ALT FLD-CCNC: 29 U/L — SIGNIFICANT CHANGE UP (ref 0–41)
ANION GAP SERPL CALC-SCNC: 16 MMOL/L — HIGH (ref 7–14)
APPEARANCE UR: CLEAR — SIGNIFICANT CHANGE UP
AST SERPL-CCNC: 50 U/L — HIGH (ref 0–41)
BASE EXCESS BLDV CALC-SCNC: -0.1 MMOL/L — SIGNIFICANT CHANGE UP (ref -2–2)
BILIRUB SERPL-MCNC: 0.3 MG/DL — SIGNIFICANT CHANGE UP (ref 0.2–1.2)
BILIRUB UR-MCNC: NEGATIVE — SIGNIFICANT CHANGE UP
BUN SERPL-MCNC: 64 MG/DL — CRITICAL HIGH (ref 10–20)
CA-I SERPL-SCNC: 1.21 MMOL/L — SIGNIFICANT CHANGE UP (ref 1.12–1.3)
CALCIUM SERPL-MCNC: 9.4 MG/DL — SIGNIFICANT CHANGE UP (ref 8.5–10.1)
CHLORIDE SERPL-SCNC: 100 MMOL/L — SIGNIFICANT CHANGE UP (ref 98–110)
CO2 SERPL-SCNC: 22 MMOL/L — SIGNIFICANT CHANGE UP (ref 17–32)
COLOR SPEC: YELLOW — SIGNIFICANT CHANGE UP
CREAT SERPL-MCNC: 2.3 MG/DL — HIGH (ref 0.7–1.5)
D DIMER BLD IA.RAPID-MCNC: 209 NG/ML DDU — SIGNIFICANT CHANGE UP (ref 0–230)
DIFF PNL FLD: NEGATIVE — SIGNIFICANT CHANGE UP
EPI CELLS # UR: ABNORMAL /HPF
GAS PNL BLDV: 138 MMOL/L — SIGNIFICANT CHANGE UP (ref 136–145)
GAS PNL BLDV: SIGNIFICANT CHANGE UP
GAS PNL BLDV: SIGNIFICANT CHANGE UP
GLUCOSE SERPL-MCNC: 98 MG/DL — SIGNIFICANT CHANGE UP (ref 70–99)
GLUCOSE UR QL: NEGATIVE MG/DL — SIGNIFICANT CHANGE UP
HCO3 BLDV-SCNC: 26 MMOL/L — SIGNIFICANT CHANGE UP (ref 22–29)
HCT VFR BLD CALC: 30.6 % — LOW (ref 37–47)
HCT VFR BLDA CALC: 38.3 % — SIGNIFICANT CHANGE UP (ref 34–44)
HGB BLD CALC-MCNC: 12.5 G/DL — LOW (ref 14–18)
HGB BLD-MCNC: 9.6 G/DL — LOW (ref 12–16)
INR BLD: 1.08 RATIO — SIGNIFICANT CHANGE UP (ref 0.65–1.3)
KETONES UR-MCNC: NEGATIVE — SIGNIFICANT CHANGE UP
LACTATE BLDV-MCNC: 0.5 MMOL/L — SIGNIFICANT CHANGE UP (ref 0.5–1.6)
LEUKOCYTE ESTERASE UR-ACNC: NEGATIVE — SIGNIFICANT CHANGE UP
MAGNESIUM SERPL-MCNC: 2.1 MG/DL — SIGNIFICANT CHANGE UP (ref 1.8–2.4)
MCHC RBC-ENTMCNC: 28.7 PG — SIGNIFICANT CHANGE UP (ref 27–31)
MCHC RBC-ENTMCNC: 31.4 G/DL — LOW (ref 32–37)
MCV RBC AUTO: 91.6 FL — SIGNIFICANT CHANGE UP (ref 81–99)
NITRITE UR-MCNC: NEGATIVE — SIGNIFICANT CHANGE UP
NRBC # BLD: 0 /100 WBCS — SIGNIFICANT CHANGE UP (ref 0–0)
NT-PROBNP SERPL-SCNC: 7082 PG/ML — HIGH (ref 0–300)
PCO2 BLDV: 48 MMHG — SIGNIFICANT CHANGE UP (ref 41–51)
PH BLDV: 7.35 — SIGNIFICANT CHANGE UP (ref 7.26–7.43)
PH UR: 6 — SIGNIFICANT CHANGE UP (ref 5–8)
PLATELET # BLD AUTO: 216 K/UL — SIGNIFICANT CHANGE UP (ref 130–400)
PO2 BLDV: 18 MMHG — LOW (ref 20–40)
POTASSIUM BLDV-SCNC: 4.8 MMOL/L — SIGNIFICANT CHANGE UP (ref 3.3–5.6)
POTASSIUM SERPL-MCNC: 5.4 MMOL/L — HIGH (ref 3.5–5)
POTASSIUM SERPL-SCNC: 5.4 MMOL/L — HIGH (ref 3.5–5)
PROT SERPL-MCNC: 7.3 G/DL — SIGNIFICANT CHANGE UP (ref 6–8)
PROT UR-MCNC: 100 MG/DL
PROTHROM AB SERPL-ACNC: 12.4 SEC — SIGNIFICANT CHANGE UP (ref 9.95–12.87)
RBC # BLD: 3.34 M/UL — LOW (ref 4.2–5.4)
RBC # FLD: 14 % — SIGNIFICANT CHANGE UP (ref 11.5–14.5)
SAO2 % BLDV: 26 % — SIGNIFICANT CHANGE UP
SODIUM SERPL-SCNC: 138 MMOL/L — SIGNIFICANT CHANGE UP (ref 135–146)
SP GR SPEC: 1.01 — SIGNIFICANT CHANGE UP (ref 1.01–1.03)
TROPONIN T SERPL-MCNC: <0.01 NG/ML — SIGNIFICANT CHANGE UP
UROBILINOGEN FLD QL: 0.2 MG/DL — SIGNIFICANT CHANGE UP (ref 0.2–0.2)
WBC # BLD: 9.65 K/UL — SIGNIFICANT CHANGE UP (ref 4.8–10.8)
WBC # FLD AUTO: 9.65 K/UL — SIGNIFICANT CHANGE UP (ref 4.8–10.8)
WBC UR QL: SIGNIFICANT CHANGE UP /HPF

## 2018-11-04 PROCEDURE — 93970 EXTREMITY STUDY: CPT | Mod: 26

## 2018-11-04 RX ORDER — SODIUM CHLORIDE 9 MG/ML
1000 INJECTION INTRAMUSCULAR; INTRAVENOUS; SUBCUTANEOUS ONCE
Qty: 0 | Refills: 0 | Status: DISCONTINUED | OUTPATIENT
Start: 2018-11-04 | End: 2018-11-04

## 2018-11-04 RX ORDER — ATORVASTATIN CALCIUM 80 MG/1
40 TABLET, FILM COATED ORAL AT BEDTIME
Qty: 0 | Refills: 0 | Status: DISCONTINUED | OUTPATIENT
Start: 2018-11-04 | End: 2018-11-20

## 2018-11-04 RX ORDER — HYDRALAZINE HCL 50 MG
20 TABLET ORAL ONCE
Qty: 0 | Refills: 0 | Status: COMPLETED | OUTPATIENT
Start: 2018-11-04 | End: 2018-11-04

## 2018-11-04 RX ORDER — HEPARIN SODIUM 5000 [USP'U]/ML
5000 INJECTION INTRAVENOUS; SUBCUTANEOUS EVERY 8 HOURS
Qty: 0 | Refills: 0 | Status: DISCONTINUED | OUTPATIENT
Start: 2018-11-04 | End: 2018-11-20

## 2018-11-04 RX ORDER — ALBUTEROL 90 UG/1
10 AEROSOL, METERED ORAL ONCE
Qty: 0 | Refills: 0 | Status: DISCONTINUED | OUTPATIENT
Start: 2018-11-04 | End: 2018-11-04

## 2018-11-04 RX ORDER — PANTOPRAZOLE SODIUM 20 MG/1
40 TABLET, DELAYED RELEASE ORAL
Qty: 0 | Refills: 0 | Status: DISCONTINUED | OUTPATIENT
Start: 2018-11-04 | End: 2018-11-16

## 2018-11-04 RX ORDER — MORPHINE SULFATE 50 MG/1
2 CAPSULE, EXTENDED RELEASE ORAL EVERY 4 HOURS
Qty: 0 | Refills: 0 | Status: DISCONTINUED | OUTPATIENT
Start: 2018-11-04 | End: 2018-11-11

## 2018-11-04 RX ORDER — CALCITRIOL 0.5 UG/1
0.25 CAPSULE ORAL DAILY
Qty: 0 | Refills: 0 | Status: DISCONTINUED | OUTPATIENT
Start: 2018-11-04 | End: 2018-11-20

## 2018-11-04 RX ORDER — LATANOPROST 0.05 MG/ML
1 SOLUTION/ DROPS OPHTHALMIC; TOPICAL AT BEDTIME
Qty: 0 | Refills: 0 | Status: DISCONTINUED | OUTPATIENT
Start: 2018-11-04 | End: 2018-11-20

## 2018-11-04 RX ORDER — MORPHINE SULFATE 50 MG/1
2 CAPSULE, EXTENDED RELEASE ORAL ONCE
Qty: 0 | Refills: 0 | Status: DISCONTINUED | OUTPATIENT
Start: 2018-11-04 | End: 2018-11-04

## 2018-11-04 RX ORDER — SODIUM POLYSTYRENE SULFONATE 4.1 MEQ/G
30 POWDER, FOR SUSPENSION ORAL ONCE
Qty: 0 | Refills: 0 | Status: DISCONTINUED | OUTPATIENT
Start: 2018-11-04 | End: 2018-11-04

## 2018-11-04 RX ORDER — ACETAMINOPHEN 500 MG
650 TABLET ORAL ONCE
Qty: 0 | Refills: 0 | Status: COMPLETED | OUTPATIENT
Start: 2018-11-04 | End: 2018-11-04

## 2018-11-04 RX ORDER — ASPIRIN/CALCIUM CARB/MAGNESIUM 324 MG
325 TABLET ORAL ONCE
Qty: 0 | Refills: 0 | Status: COMPLETED | OUTPATIENT
Start: 2018-11-04 | End: 2018-11-04

## 2018-11-04 RX ORDER — TAMSULOSIN HYDROCHLORIDE 0.4 MG/1
0.4 CAPSULE ORAL ONCE
Qty: 0 | Refills: 0 | Status: DISCONTINUED | OUTPATIENT
Start: 2018-11-04 | End: 2018-11-04

## 2018-11-04 RX ORDER — FUROSEMIDE 40 MG
40 TABLET ORAL DAILY
Qty: 0 | Refills: 0 | Status: DISCONTINUED | OUTPATIENT
Start: 2018-11-04 | End: 2018-11-14

## 2018-11-04 RX ORDER — OXYCODONE AND ACETAMINOPHEN 5; 325 MG/1; MG/1
1 TABLET ORAL EVERY 6 HOURS
Qty: 0 | Refills: 0 | Status: DISCONTINUED | OUTPATIENT
Start: 2018-11-04 | End: 2018-11-10

## 2018-11-04 RX ORDER — METOPROLOL TARTRATE 50 MG
50 TABLET ORAL
Qty: 0 | Refills: 0 | Status: DISCONTINUED | OUTPATIENT
Start: 2018-11-04 | End: 2018-11-20

## 2018-11-04 RX ORDER — FOLIC ACID 0.8 MG
1 TABLET ORAL DAILY
Qty: 0 | Refills: 0 | Status: DISCONTINUED | OUTPATIENT
Start: 2018-11-04 | End: 2018-11-20

## 2018-11-04 RX ORDER — INFLUENZA VIRUS VACCINE 15; 15; 15; 15 UG/.5ML; UG/.5ML; UG/.5ML; UG/.5ML
0.5 SUSPENSION INTRAMUSCULAR ONCE
Qty: 0 | Refills: 0 | Status: DISCONTINUED | OUTPATIENT
Start: 2018-11-04 | End: 2018-11-20

## 2018-11-04 RX ADMIN — HEPARIN SODIUM 5000 UNIT(S): 5000 INJECTION INTRAVENOUS; SUBCUTANEOUS at 21:29

## 2018-11-04 RX ADMIN — Medication 325 MILLIGRAM(S): at 13:04

## 2018-11-04 RX ADMIN — MORPHINE SULFATE 2 MILLIGRAM(S): 50 CAPSULE, EXTENDED RELEASE ORAL at 21:04

## 2018-11-04 RX ADMIN — LATANOPROST 1 DROP(S): 0.05 SOLUTION/ DROPS OPHTHALMIC; TOPICAL at 21:29

## 2018-11-04 RX ADMIN — Medication 20 MILLIGRAM(S): at 21:03

## 2018-11-04 RX ADMIN — Medication 650 MILLIGRAM(S): at 12:25

## 2018-11-04 RX ADMIN — ATORVASTATIN CALCIUM 40 MILLIGRAM(S): 80 TABLET, FILM COATED ORAL at 21:29

## 2018-11-04 NOTE — ED ADULT NURSE NOTE - OBJECTIVE STATEMENT
patient reports to the ED with a complaint of SOB and chest pain since this morning. noticed her lower extremities have been a little more swollen than normal. denies any dizziness, cough, back pain, abdominal pain, diaphoresis, palpitations, nausea/vomiting

## 2018-11-04 NOTE — H&P ADULT - NSHPLABSRESULTS_GEN_ALL_CORE
CT Abdomen and Pelvis No Cont (11.04.18)  No acute abnormality the abdomen and pelvis. No nephrolithiasis.    Xray Chest 2 Views PA/Lat (11.04.18)  New right-sided interstitial opacities

## 2018-11-04 NOTE — H&P ADULT - NSHPPHYSICALEXAM_GEN_ALL_CORE
T(C): 36.7 (11-04-18 @ 15:26), Max: 36.9 (11-04-18 @ 10:25)  HR: 76 (11-04-18 @ 15:26) (68 - 76)  BP: 167/72 (11-04-18 @ 15:26) (162/77 - 167/72)  RR: 17 (11-04-18 @ 10:25) (17 - 17)  SpO2: 100% (11-04-18 @ 15:26) (100% - 100%)    PHYSICAL EXAM:  GENERAL: NAD, well-developed  HEAD:  Atraumatic, Normocephalic  EYES: EOMI, PERRLA, conjunctiva and sclera clear  ENT: Normal tympanic membrane. No nasal obstruction or discharge. No tonsillar exudate, swelling or erythema.  NECK: Supple, No JVD  CHEST/LUNG: Clear to auscultation bilaterally; No wheeze  HEART: Regular rate and rhythm; No murmurs, rubs, or gallops  ABDOMEN: Soft, Nontender, Nondistended; Bowel sounds present  EXTREMITIES:  2+ Peripheral Pulses, No clubbing, cyanosis, or edema  PSYCH: AAOx3  NEUROLOGY: non-focal  SKIN: No rashes or lesions

## 2018-11-04 NOTE — ED PROVIDER NOTE - CARE PLAN
Principal Discharge DX:	Other chest pain  Secondary Diagnosis:	Shortness of breath  Secondary Diagnosis:	Left flank pain  Secondary Diagnosis:	Pain in both knees, unspecified chronicity

## 2018-11-04 NOTE — H&P ADULT - ATTENDING COMMENTS
Medicine Coverage for Dr. Celeste:    pt seen and examined in detail.  Resident note, including findings and plan reviewed and agreed.  PT known to me from prior similar admissions, now p/w neck pain, LBP, left flank pain, b/l chest pain, b/l knee pain, poor appetite, unsteady ambulation, sob, cough.  VSS afeb, nad, a+o x3, cta b/l, rr, ppm, soft, nt, no cvat, + bs, no cce, no rash.  labs c/w mild anemia, stable ckd, mild hyperkalemia, elevatyed bnp, right sided infiltrate on cxray, neg CT abd, ce neg, proteinuria, neg le duplex for dvt.    a/p  CP / neck pain / back and flank pain / b/l knee pain  right sided infiltrate on cxray - r/o PNA  severe diverticulosis on CT abd - no diverticulitis  NICMP / AICD / PPM  CKD 4 stable  history of dvt / PE - provoked off AC now  HTN    plan:  tele / CE x 3  cardio eval - Dr. Hank garcia eval - Dr Ho stauffer levaquin 250mg iv qd  prn percocet  oob ambulate  pt / rehab eval  please contact my cell [phone] for any questions / issues

## 2018-11-04 NOTE — H&P ADULT - ASSESSMENT
83 yr old female with pmhx of HTN, CKD 3, recent DVT/PE (thought provoked by travel, completed 6mo a/c and no longer on Eliquis CHF s/p PPM presented to ER for chest pain.    #) SOB secondary to aspiration PNA  - start Unasyn 3g q12 (renally adjusted)  - D-dimer neg.  - CXR: New right-sided interstitial opacities    #) Chest pain  - trops negative --> f/u second set; - EKG (on admission): AV paced  - admit to tele for observation    #) CHF  - BNP 7000  - c/w ASA, metoprolol, enalapril    #) HTN  - c/w metoprolol    #) CKD stage 3   - stable     #) Diet   - DASH    #) DVT ppx  - start Lovenox 40mg daily    #) Activity  - ambulate as tolerated    #) Disposition  - discharge within 24 hours    #) Full code status 83 yr old female with pmhx of HTN, CKD 3, recent DVT/PE (thought provoked by travel, completed 6mo a/c and no longer on Eliquis CHF s/p PPM presented to ER for chest pain.    #) Community acquired pns  - start levofloxacin IV 750mg q24   - D-dimer neg.  - CXR: New right-sided interstitial opacities    #) Chest pain  - trops negative --> f/u second set; - EKG (on admission): AV paced  - f/u ECHO  - admit to tele for observation    #) CHF  - BNP 7000  - c/w ASA, metoprolol, enalapril    #) HTN  - c/w metoprolol    #) CKD stage 4  - stable  - monitor bmp     #) Diet   - DASH    #) DVT ppx  - start Lovenox 40mg daily    #) Activity  - ambulate as tolerated    #) Disposition  - discharge within 24 hours    #) Full code status 83 yr old female with pmhx of HTN, CKD 3, recent DVT/PE (thought provoked by travel, completed 6mo a/c and no longer on Eliquis CHF s/p PPM presented to ER for chest pain.    #) Community acquired pna  - [id number]: 2 points  - start levofloxacin IV 750mg q24   - D-dimer neg.  - CXR: New right-sided interstitial opacities    #) Chest pain  - trops negative --> f/u second set  - EKG (on admission): AV paced  - f/u ECHO  - admit to tele for observation    #) CHF  - BNP 7000  - c/w ASA, metoprolol, enalapril    #) HTN  - c/w metoprolol    #) CKD stage 4  - stable  - monitor bmp     #) Diet   - DASH    #) DVT ppx  - start Lovenox 40mg daily    #) Activity  - ambulate as tolerated    #) Disposition  - from home    #) Full code status 83 yr old female with pmhx of HTN, CKD 4, recent DVT/PE (thought provoked by travel, completed 6mo a/c and no longer on Eliquis) CHF s/p PPM presented to ER for cough and chest pain.    #) Community acquired PNA  - [id number]: 2 points  - start levofloxacin IV 750mg q24   - D-dimer neg.  - CXR: New right-sided interstitial opacities    #) Chest pain  - trops negative --> f/u second set  - EKG (on admission): AV paced  - f/u ECHO  - admit to tele for observation    #) CHF s/p PPM   - BNP 7000  - c/w lopressor, lasix, statin    #) HTN  - c/w  lopressor, Lasix    #) CKD stage 4  - stable  - c/w calcitriol  - monitor bmp     #) Diet   - DASH    #) DVT ppx / GI ppx  - start Lovenox 40mg daily / Pantoprazole     #) Activity  - ambulate as tolerated    #) Disposition  - from home    #) Full code status

## 2018-11-04 NOTE — H&P ADULT - NSHPREVIEWOFSYSTEMS_GEN_ALL_CORE
CONSTITUTIONAL: No weakness, fevers or chills  EYES/ENT: No visual changes;  No vertigo or throat pain   NECK: No pain or stiffness  RESPIRATORY: No cough, wheezing, hemoptysis; No shortness of breath  CARDIOVASCULAR: + chest pain, + palpitations  GASTROINTESTINAL: No abdominal or epigastric pain. No nausea, vomiting, or hematemesis; No diarrhea or constipation. No melena or hematochezia.  GENITOURINARY: No dysuria, frequency or hematuria  NEUROLOGICAL: No numbness or weakness  SKIN: No itching, rashes CONSTITUTIONAL: No weakness, fevers or chills  EYES/ENT: No visual changes;  No vertigo or throat pain   NECK: No pain or stiffness  RESPIRATORY: + cough, wheezing, hemoptysis; No shortness of breath  CARDIOVASCULAR: + chest pain,  palpitations  GASTROINTESTINAL: No abdominal or epigastric pain. No nausea, vomiting, or hematemesis; No diarrhea or constipation. No melena or hematochezia.  GENITOURINARY: No dysuria, frequency or hematuria  NEUROLOGICAL: No numbness or weakness  SKIN: No itching, rashes

## 2018-11-04 NOTE — ED PROVIDER NOTE - OBJECTIVE STATEMENT
This is an 83yF HTN s/p PPM (for bradycardia? vs heart block) recent DVT/PE (thought provoked by travel, completed 6mo a/c and no longer on eliquis) who presents for SOB and CP that started this morning. Pt noticed the pain when she awoke at 3am as per her usual.  Pain is in her anterior chest, worse w/ breathing, not associated with fever or cough.  Also reports pain at her L flank and b/l knees (unclear if knee pain is c/w her chronic arthritis).  No recent travel or sick contacts.

## 2018-11-04 NOTE — ED ADULT NURSE NOTE - INTERVENTIONS DEFINITIONS
Monitor gait and stability/[place] to call system/Call bell, personal items and telephone within reach/Instruct patient to call for assistance/Reinforce activity limits and safety measures with patient and family/Stretcher in lowest position, wheels locked, appropriate side rails in place/Non-slip footwear when patient is off stretcher/Provide visual cue, wrist band, yellow gown, etc./Monitor for mental status changes and reorient to person, place, and time/Physically safe environment: no spills, clutter or unnecessary equipment

## 2018-11-04 NOTE — ED PROVIDER NOTE - MEDICAL DECISION MAKING DETAILS
83yF HTN CHF who presents for CP, SOB, L flank pain and b/l knee pain. EKG, CXR, labs reassuring. D-dimer neg.  D/w Dr. Celeste's office, who reports that this is her typical presentation when she gets admitted, typically with neg inpatient workup.  Pt and family are concerned about going home given her chest pain.  She is an elderly woman with risk factors including CHF, so will admit to medicine for tele monitoring, serial troponin and further care.

## 2018-11-04 NOTE — H&P ADULT - NSHPSOCIALHISTORY_GEN_ALL_CORE
Marital Status:  (   )    (   ) Single    (   )    ( x )   Lives with: (  ) alone  ( x ) children   (  ) spouse   (  ) parents  (  ) other  Recent Travel: no recent travel  Substance Use (street drugs): ( x ) never used  (  ) other:  Tobacco Usage:  ( x  ) never smoked   (   ) former smoker   (   ) current smoker  (     ) pack year  Alcohol Usage: none

## 2018-11-04 NOTE — ED PROVIDER NOTE - PROGRESS NOTE DETAILS
Labs w/o leukocytosis, with anemia mildly worsened compared to prior (10.4 --> 9.6), K+ mildly elevated at 5.4 (but 4.8 on VBG, no peaked T waves, so will not treat), stable CKD (Cr 2.2 --> 2.1). D-dimer not elevated.  BNP 7000.  Pt continues to complain of L flank pain, more so than CP or SOB. D/w pt lab findings.  Will proceed to CT A/P (noncontrast) and then likely admit for CP r/o and pain control. CT c/w [id number] UVJ stone w/ mild hydronephrosis. Urology consulted given baseline CKD, now w/ elevated BUN and borderline hyperkalemia.  Pt to be admitted for CP and SOB and urology agrees to follow. Attending radiology read w/o kidney stone.  Will continue to treat patient's pain, now of unclear etiology.  Plan to admit for CP, SOB.

## 2018-11-04 NOTE — H&P ADULT - HISTORY OF PRESENT ILLNESS
IN PROGRESS    83 yr old female with pmhx of HTN, CKD 3, recent DVT/PE (thought provoked by travel, completed 6mo a/c and no longer on Eliquis CHF s/p PPM presented to ER for chest pain. As per patient, chest pain started at around 3AM last night, sharp, centrally located, non radiating  Also reports pain at her L flank and b/l knees (unclear if knee pain is c/w her chronic arthritis).      No recent travel or sick contacts.    At baseline, she self ambulates and able to take care of ADL's. 83 yr old female with pmhx of HTN, CKD 4, recent DVT/PE (thought provoked by travel, completed 6mo a/c and no longer on Eliquis) CHF s/p PPM presented to ER for cough and chest pain. As per patient, she developed non productive cough for the past 2 days with no fevers. Today morning 3am she woke up with pain in the L flank and b/l knees that is 6-8/10 intermittent and she also had one episode of chest tightness centrally located, non radiating which resolved on its own. She denies any fever, chills, headache, urinary or bowel issues. No recent travel or sick contacts.

## 2018-11-04 NOTE — ED PROVIDER NOTE - NS ED ROS FT
Constitutional: no fevers/chills, no sick contacts  Eyes: No visual changes, eye pain or discharge. No photophobia  ENMT: No hearing changes, pain, discharge or infections. No sore throat or drooling.  Neck:  No neck pain or stiffness. No limited ROM  Cardiac: + SOB, no edema. +chest pain at rest  Respiratory: No cough or respiratory distress. No hemoptysis. No history of asthma or RAD  GI: No nausea, vomiting, diarrhea or abdominal pain  : No dysuria, frequency or burning. No discharge  MS: No myalgia, muscle weakness, + joint pain or back pain  Neuro: No headache or weakness. No LOC  Skin: No skin rash  Endo: no diabetes or thyroid dysfunction  Heme: no abnormal bleeding or clotting  Except as documented in the HPI, all other systems are negative

## 2018-11-04 NOTE — ED PROVIDER NOTE - PHYSICAL EXAMINATION
CONSTITUTIONAL: well developed; well nourished; well appearing in no acute distress  HEAD: normocephalic; atraumatic  EYES: no conjunctival injection, no scleral icterus  ENT: no nasal discharge; airway clear.  NECK: supple; non tender. + full passive ROM in all directions  CARD: S1, S2 normal; no murmurs, gallops, or rubs. Regular rate and rhythm  RESP: no wheezes, rales or rhonchi. Good air movement bilaterally without significant accessory muscle use  ABD: soft; non-distended; non-tender. No rebound, no guarding, no pulsatile abdominal mass  EXT: moving all extremities spontaneously, normal ROM. No clubbing, cyanosis or edema  SKIN: warm and dry, no lesions noted  NEURO: alert, oriented, CN II-XII grossly intact, motor and sensory grossly intact, speech nonslurred, no focal deficits. GCS 15  PSYCH: calm, cooperative, appropriate, good eye contact, logical thought process, no apparent danger to self or others

## 2018-11-05 ENCOUNTER — TRANSCRIPTION ENCOUNTER (OUTPATIENT)
Age: 83
End: 2018-11-05

## 2018-11-05 LAB
CK MB CFR SERPL CALC: 4.9 NG/ML — SIGNIFICANT CHANGE UP (ref 0.6–6.3)
CK MB CFR SERPL CALC: 5.1 NG/ML — SIGNIFICANT CHANGE UP (ref 0.6–6.3)
CK SERPL-CCNC: 195 U/L — SIGNIFICANT CHANGE UP (ref 0–225)
CK SERPL-CCNC: 201 U/L — SIGNIFICANT CHANGE UP (ref 0–225)
TROPONIN T SERPL-MCNC: 0.01 NG/ML — SIGNIFICANT CHANGE UP
TROPONIN T SERPL-MCNC: 0.02 NG/ML — HIGH

## 2018-11-05 RX ORDER — SIMETHICONE 80 MG/1
80 TABLET, CHEWABLE ORAL THREE TIMES A DAY
Qty: 0 | Refills: 0 | Status: DISCONTINUED | OUTPATIENT
Start: 2018-11-05 | End: 2018-11-20

## 2018-11-05 RX ADMIN — LATANOPROST 1 DROP(S): 0.05 SOLUTION/ DROPS OPHTHALMIC; TOPICAL at 21:09

## 2018-11-05 RX ADMIN — ATORVASTATIN CALCIUM 40 MILLIGRAM(S): 80 TABLET, FILM COATED ORAL at 21:08

## 2018-11-05 RX ADMIN — HEPARIN SODIUM 5000 UNIT(S): 5000 INJECTION INTRAVENOUS; SUBCUTANEOUS at 21:09

## 2018-11-05 RX ADMIN — OXYCODONE AND ACETAMINOPHEN 1 TABLET(S): 5; 325 TABLET ORAL at 17:59

## 2018-11-05 RX ADMIN — Medication 40 MILLIGRAM(S): at 06:11

## 2018-11-05 RX ADMIN — Medication 50 MILLIGRAM(S): at 17:58

## 2018-11-05 RX ADMIN — SIMETHICONE 80 MILLIGRAM(S): 80 TABLET, CHEWABLE ORAL at 21:55

## 2018-11-05 RX ADMIN — Medication 50 MILLIGRAM(S): at 06:11

## 2018-11-05 RX ADMIN — HEPARIN SODIUM 5000 UNIT(S): 5000 INJECTION INTRAVENOUS; SUBCUTANEOUS at 14:13

## 2018-11-05 RX ADMIN — PANTOPRAZOLE SODIUM 40 MILLIGRAM(S): 20 TABLET, DELAYED RELEASE ORAL at 06:11

## 2018-11-05 RX ADMIN — MORPHINE SULFATE 2 MILLIGRAM(S): 50 CAPSULE, EXTENDED RELEASE ORAL at 21:08

## 2018-11-05 RX ADMIN — OXYCODONE AND ACETAMINOPHEN 1 TABLET(S): 5; 325 TABLET ORAL at 07:48

## 2018-11-05 RX ADMIN — Medication 1 MILLIGRAM(S): at 12:27

## 2018-11-05 RX ADMIN — OXYCODONE AND ACETAMINOPHEN 1 TABLET(S): 5; 325 TABLET ORAL at 17:58

## 2018-11-05 RX ADMIN — HEPARIN SODIUM 5000 UNIT(S): 5000 INJECTION INTRAVENOUS; SUBCUTANEOUS at 06:11

## 2018-11-05 RX ADMIN — CALCITRIOL 0.25 MICROGRAM(S): 0.5 CAPSULE ORAL at 12:27

## 2018-11-05 NOTE — DISCHARGE NOTE ADULT - PLAN OF CARE
Medical Management CT abdomin shows no acute pathology. We also checked pelvic US also was unremarkable. No suspicion of any infection. Work up was negative. She will need pain management and rehab as outpatient. no further inpatient work up needed. Cardiac work up negative for any ACS you had sigmoid diverticulitis that was treated with IV antibiotics (cipro and flagyl), with IV hydration.  you condition improved and you are now able to tolerate diet  avoid constipation  continue medical management  follow up with you gastroenterologist for colonoscopy in 6-8 weeks Cardiac work up negative for any acute coronary syndrome, PPM interrogation was negative  Do not give hydralazine she has had a Lupus reaction with this medication   you cannot now take ACE or ARB   If needed add Amlodipine for BP   follow up with cardiology as outpatient

## 2018-11-05 NOTE — DISCHARGE NOTE ADULT - CARE PLAN
Principal Discharge DX:	Left flank pain  Goal:	Medical Management  Assessment and plan of treatment:	CT abdomin shows no acute pathology. We also checked pelvic US also was unremarkable. No suspicion of any infection. Work up was negative. She will need pain management and rehab as outpatient. no further inpatient work up needed.  Secondary Diagnosis:	Other chest pain  Goal:	Medical Management  Assessment and plan of treatment:	Cardiac work up negative for any ACS Principal Discharge DX:	Diverticulitis  Goal:	Medical Management  Assessment and plan of treatment:	you had sigmoid diverticulitis that was treated with IV antibiotics (cipro and flagyl), with IV hydration.  you condition improved and you are now able to tolerate diet  avoid constipation  continue medical management  follow up with you gastroenterologist for colonoscopy in 6-8 weeks  Secondary Diagnosis:	Other chest pain  Goal:	Medical Management  Assessment and plan of treatment:	Cardiac work up negative for any acute coronary syndrome, PPM interrogation was negative  Do not give hydralazine she has had a Lupus reaction with this medication   you cannot now take ACE or ARB   If needed add Amlodipine for BP   follow up with cardiology as outpatient

## 2018-11-05 NOTE — DISCHARGE NOTE ADULT - PATIENT PORTAL LINK FT
You can access the [facility] Patient Portal, offered by [facility], by registering with the following website: [email or web address]

## 2018-11-05 NOTE — PHYSICAL THERAPY INITIAL EVALUATION ADULT - ADDITIONAL COMMENTS
Pt reports she was walking with a rollator for community ambulation but household ambulation just with a cane or no AD however recently she now states she requires AD for all ambulation 2* balance and weakness.

## 2018-11-05 NOTE — DISCHARGE NOTE ADULT - CARE PROVIDERS DIRECT ADDRESSES
,DirectAddress_Unknown ,DirectAddress_Unknown,[email or web address],DirectAddress_Unknown

## 2018-11-05 NOTE — CONSULT NOTE ADULT - ASSESSMENT
IMPRESSION: Rehab of gait ab OA/ CHf    PRECAUTIONS: [  x  ] Cardiac  [  x  ] Respiratory  [    ] Seizures [    ] Contact Isolation  [    ] Droplet Isolation  [    ] Other    Weight Bearing Status:     RECOMMENDATION:    Out of Bed to Chair     DVT/Decubiti Prophylaxis    REHAB PLAN:     [ x    ] Bedside P/T 3-5 times a week   [     ] Bedside O/T  2-3 times a week   [     ] No Rehab Therapy Indicated   [     ]  Speech Therapy   Conditioning/ROM                                 ADL  Bed Mobility                                            Conditioning/ROM  Transfers                                                  Bed Mobility  Sitting /Standing Balance                      Transfers                                        Gait Training                                            Sitting/Standing Balance  Stair Training [   ]Applicable                 Home equipment Eval                                                                     Splinting  [   ] Only      GOALS:   ADL   [x    ]   Independent         Transfers  [  x  ] Independent            Ambulation  [ x    ] Independent     [  x   ] With device                            [    ]  CG                                               [    ]  CG                                                    [     ] CG                            [    ] Min A                                          [    ] Min A                                                [     ] Min  A          DISCHARGE PLAN:   [     ]  Good candidate for Intensive Rehabilitation/Hospital based                                             Will tolerate 3hrs Intensive Rehab Daily                                       [  x    ]  Short Term Rehab in Skilled Nursing Facility                              VS                                     [  x    ]  Home with Outpatient or VN services                                         [      ]  Possible Candidate for Intensive Hospital based Rehab

## 2018-11-05 NOTE — CONSULT NOTE ADULT - SUBJECTIVE AND OBJECTIVE BOX
Patient is a 83y old  Female who presents with a chief complaint of chest pain and SOB (2018 11:06)    HPI:  83 yr old female with pmhx of HTN, CKD 4, recent DVT/PE (thought provoked by travel, completed 6mo a/c and no longer on Eliquis) CHF s/p PPM presented to ER for cough and chest pain. As per patient, she developed non productive cough for the past 2 days with no fevers. Today morning 3am she woke up with pain in the L flank and b/l knees that is 6-8/10 intermittent and she also had one episode of chest tightness centrally located, non radiating which resolved on its own. She denies any fever, chills, headache, urinary or bowel issues. No recent travel or sick contacts. (2018 19:50)      PAST MEDICAL & SURGICAL HISTORY:  Hypertension  Pacemaker  Chronic kidney disease  Artificial pacemaker      Hospital Course:  Community acquired PNA  - CURB65: 2 points  - CXR: New right-sided interstitial opacities.  - D-dimer neg.  - c/w levofloxacin PO 250mg q24   - Pulmonary (Dr. Jenni Teague) consulted.     Chest pain  - EKG (on admission): AV paced  - 1st CE neg x 1  - will trend CE  - f/u ECHO  - c/w Tele for now     CHF s/p PPM   - BNP 7000  - c/w lopressor, lasix, statin    HTN  - c/w  lopressor, Lasix    CKD stage 4  - stable (baseline ~ 2.3 - 2.8)  - c/w calcitriol  - monitor bmp       Diet: DASH  DVT ppx / GI ppx: Lovenox 40mg daily / Pantoprazole     TODAY'S SUBJECTIVE & REVIEW OF SYMPTOMS:     Constitutional Weakness   Cardio WNL   Resp WNL   GI WNL  Heme WNL  Endo WNL  Skin WNL  MSK LBP Chronic Knee pain secondary to OA  Neuro WNL  Cognitive WNL  Psych WNL      MEDICATIONS  (STANDING):  atorvastatin 40 milliGRAM(s) Oral at bedtime  calcitriol   Capsule 0.25 MICROGram(s) Oral daily  folic acid 1 milliGRAM(s) Oral daily  furosemide    Tablet 40 milliGRAM(s) Oral daily  heparin  Injectable 5000 Unit(s) SubCutaneous every 8 hours  influenza   Vaccine 0.5 milliLiter(s) IntraMuscular once  latanoprost 0.005% Ophthalmic Solution 1 Drop(s) Both EYES at bedtime  levoFLOXacin  Tablet 250 milliGRAM(s) Oral every 24 hours  metoprolol tartrate 50 milliGRAM(s) Oral two times a day  pantoprazole    Tablet 40 milliGRAM(s) Oral before breakfast    MEDICATIONS  (PRN):  morphine  - Injectable 2 milliGRAM(s) IV Push every 4 hours PRN Severe Pain (7 - 10)  oxyCODONE    5 mG/acetaminophen 325 mG 1 Tablet(s) Oral every 6 hours PRN Moderate Pain (4 - 6)      FAMILY HISTORY:  No pertinent family history in first degree relatives      Allergies    Keflex (Unknown)    Intolerances        SOCIAL HISTORY:    [    ] Etoh  [    ] Smoking  [    ] Substance abuse     Home Environment:  [    ] Home Alone  [  x  ] Lives with Family  [  x  ] Home Health Aid 7hrs X5    Dwelling:  [ x   ] Apartment  [   x ] Private House  [    ] Adult Home  [    ] Skilled Nursing Facility      [    ] Short Term  [    ] Long Term  [ x   ] Stairs 3                          [    ] Elevator     FUNCTIONAL STATUS PTA: (Check all that apply)  Ambulation: [ x    ]Independent    [    ] Dependent     [    ] Non-Ambulatory  Assistive Device: [    ] SA Cane  [    ]  Q Cane  [    ] Walker  [    ]  Wheelchair  ADL : [  x  ] Independent  [    ]  Dependent       Vital Signs Last 24 Hrs  T(C): 37.1 (2018 13:44), Max: 37.1 (2018 13:44)  T(F): 98.7 (2018 13:44), Max: 98.7 (2018 13:44)  HR: 68 (2018 13:44) (68 - 78)  BP: 111/63 (2018 13:44) (111/63 - 196/86)  BP(mean): --  RR: 17 (2018 13:44) (17 - 18)  SpO2: 96% (2018 02:20) (96% - 100%)      PHYSICAL EXAM: Alert & Oriented X3  GENERAL: NAD, well-groomed, well-developed  HEAD:  Atraumatic, Normocephalic  EYES: EOMI, PERRLA, conjunctiva and sclera clear  NECK: Supple, No JVD, Normal thyroid  CHEST/LUNG: Clear bilaterally; No rales, rhonchi, wheezing, or rubs  HEART: Regular rate and rhythm; No murmurs, rubs, or gallops  ABDOMEN: Soft, Nontender, Nondistended; Bowel sounds present  EXTREMITIES:  OA changes R > L knee no caldf tenderness    NERVOUS SYSTEM:  Cranial Nerves 2-12 intact [  x ] Abnormal  [    ]  ROM: WFL all extremities [    ]  Abnormal [ x    ]limited terminal extension Shahbaz klnees  Motor Strength: WFL all extremities  [   x ]  Abnormal [    ]4/5  Sensation: intact to light touch [x    ] Abnormal [    ]      FUNCTIONAL STATUS:  Bed Mobility: [   ]  Independent [    ]  Supervision [  x  ]  Needs Assistance [  ]  N/A  Transfers: [    ]  Independent [    ]  Supervision [  x  ]  Needs Assistance [    ]  N/A    Ambulation:  [    ]  Independent [    ]  Supervision [  x  ]  Needs Assistance [    ]  N/A   ADL:  [    ]   Independent [   x ] Requires Assistance [    ] N/A       LABS:                        9.6    9.65  )-----------( 216      ( 2018 11:32 )             30.6     11-    138  |  100  |  64<HH>  ----------------------------<  98  5.4<H>   |  22  |  2.3<H>    Ca    9.4      2018 11:32  Mg     2.1     -    TPro  7.3  /  Alb  4.1  /  TBili  0.3  /  DBili  x   /  AST  50<H>  /  ALT  29  /  AlkPhos  99  11-    PT/INR - ( 2018 11:32 )   PT: 12.40 sec;   INR: 1.08 ratio           Urinalysis Basic - ( 2018 12:45 )    Color: Yellow / Appearance: Clear / S.015 / pH: x  Gluc: x / Ketone: Negative  / Bili: Negative / Urobili: 0.2 mg/dL   Blood: x / Protein: 100 mg/dL / Nitrite: Negative   Leuk Esterase: Negative / RBC: x / WBC 1-2 /HPF   Sq Epi: x / Non Sq Epi: Few /HPF / Bacteria: x        RADIOLOGY & ADDITIONAL STUDIES:

## 2018-11-05 NOTE — PHYSICAL THERAPY INITIAL EVALUATION ADULT - GENERAL OBSERVATIONS, REHAB EVAL
14:45-15:15. Pt encountered seated in b/s chair +tele in No apparent distress. Pt agreeable to PT. Pt left as found in b/s chair +tele in No apparent distress.

## 2018-11-05 NOTE — DISCHARGE NOTE ADULT - CARE PROVIDER_API CALL
Wade Celeste), Internal Medicine  [address] [place] [address]  Phone: [phone]  Fax: [phone] Wade Celeste), Internal Medicine  [address] [place] [address]  Phone: [phone]  Fax: [phone]    Sterling Mckinney (MD), Cardiovascular Disease; Internal Medicine  [address] [place] [address]  Phone: [phone]  Fax: [phone]    Ayush Tate), Surgery  [address] [place] [address]  Phone: [phone]  Fax: [phone]

## 2018-11-05 NOTE — DISCHARGE NOTE ADULT - MEDICATION SUMMARY - MEDICATIONS TO TAKE
I will START or STAY ON the medications listed below when I get home from the hospital:    Percocet 5/325 oral tablet  -- 1 tab(s) by mouth every 6 hours  -- Indication: For flank pain     atorvastatin 40 mg oral tablet  -- 1 tab(s) by mouth once a day  -- Indication: For Cad    Lopressor 50 mg oral tablet  -- 1 tab(s) by mouth 2 times a day  -- Indication: For Chf    Lasix 40 mg oral tablet  -- 1 tab(s) by mouth once a day  -- Indication: For Chf    latanoprost 0.005% ophthalmic solution  -- 1 drop(s) to each affected eye once a day (in the evening)  -- Indication: For glaucoma    omeprazole 40 mg oral delayed release capsule  -- 1 cap(s) by mouth once a day  -- Indication: For gerd    Citracal 250 mg + D 200 intl units oral tablet  -- 1 tab(s) by mouth once a day  -- Indication: For CKD    folic acid 0.4 mg oral tablet  -- 1 tab(s) by mouth once a day  -- Indication: For CKD    calcitriol 0.25 mcg oral capsule  -- 1 cap(s) by mouth once a day  -- Indication: For CKD I will START or STAY ON the medications listed below when I get home from the hospital:    Percocet 5/325 oral tablet  -- 1 tab(s) by mouth every 6 hours  -- Indication: For flank pain     atorvastatin 40 mg oral tablet  -- 1 tab(s) by mouth once a day  -- Indication: For Cad    Lopressor 50 mg oral tablet  -- 1 tab(s) by mouth 2 times a day  -- Indication: For Chf    lidocaine 5% topical film  -- Apply on skin to affected area once a day  -- Indication: For back pain     Lasix 40 mg oral tablet  -- 1 tab(s) by mouth once a day  -- Indication: For Chf    latanoprost 0.005% ophthalmic solution  -- 1 drop(s) to each affected eye once a day (in the evening)  -- Indication: For glaucoma    omeprazole 40 mg oral delayed release capsule  -- 1 cap(s) by mouth once a day  -- Indication: For gerd    Citracal 250 mg + D 200 intl units oral tablet  -- 1 tab(s) by mouth once a day  -- Indication: For CKD    folic acid 0.4 mg oral tablet  -- 1 tab(s) by mouth once a day  -- Indication: For CKD    calcitriol 0.25 mcg oral capsule  -- 1 cap(s) by mouth once a day  -- Indication: For CKD I will START or STAY ON the medications listed below when I get home from the hospital:    Flagyl 500 mg oral tablet  -- 1 tab(s) by mouth 3 times a day   -- Do not drink alcoholic beverages when taking this medication.  Finish all this medication unless otherwise directed by prescriber.  May discolor urine or feces.    -- Indication: For Diverticulitis    Percocet 5/325 oral tablet  -- 1 tab(s) by mouth every 8 hours PRN  -- Indication: For Pain in both knees, unspecified chronicity    atorvastatin 40 mg oral tablet  -- 1 tab(s) by mouth once a day  -- Indication: For Cad    Lopressor 50 mg oral tablet  -- 1 tab(s) by mouth 2 times a day  -- Indication: For Chf    lidocaine 5% topical film  -- Apply on skin to affected area once a day  -- Indication: For back pain     Lasix 40 mg oral tablet  -- 1 tab(s) by mouth once a day  -- Indication: For Chf    FeroSul 325 mg (65 mg elemental iron) oral tablet  -- 1 tab(s) by mouth once a day   -- Indication: For Anemia    latanoprost 0.005% ophthalmic solution  -- 1 drop(s) to each affected eye once a day (in the evening)  -- Indication: For glaucoma    sevelamer hydrochloride 800 mg oral tablet  -- 1 tab(s) by mouth 3 times a day (with meals)  -- Indication: For CKD    pantoprazole 40 mg intravenous injection  -- 40 milligram(s) intravenous once a day  -- Indication: For GI ppx    omeprazole 40 mg oral delayed release capsule  -- 1 cap(s) by mouth once a day  -- Indication: For gerd    Cipro 500 mg oral tablet  -- 1 tab(s) by mouth 2 times a day   -- Avoid prolonged or excessive exposure to direct and/or artificial sunlight while taking this medication.  Check with your doctor before becoming pregnant.  Do not take dairy products, antacids, or iron preparations within one hour of this medication.  Finish all this medication unless otherwise directed by prescriber.  Medication should be taken with plenty of water.    -- Indication: For Diverticulitis    Citracal 250 mg + D 200 intl units oral tablet  -- 1 tab(s) by mouth once a day  -- Indication: For CKD    folic acid 0.4 mg oral tablet  -- 1 tab(s) by mouth once a day  -- Indication: For CKD    calcitriol 0.25 mcg oral capsule  -- 1 cap(s) by mouth once a day  -- Indication: For CKD

## 2018-11-05 NOTE — PROGRESS NOTE ADULT - ASSESSMENT
83 yr old female with pmhx of HTN, CKD 4, recent DVT/PE (thought provoked by travel, completed 6mo a/c and no longer on Eliquis) CHF s/p PPM presented to ER for cough and chest pain.      Community acquired PNA  - CURB65: 2 points  - CXR: New right-sided interstitial opacities.  - D-dimer neg.  - c/w levofloxacin PO 250mg q24   - Pulmonary (Dr. Jenni Teague) consulted.     Chest pain  - EKG (on admission): AV paced  - 1st CE neg x 1  - will trend CE  - f/u ECHO  - c/w Tele for now     CHF s/p PPM   - BNP 7000  - c/w lopressor, lasix, statin    HTN  - c/w  lopressor, Lasix    CKD stage 4  - stable (baseline ~ 2.3 - 2.8)  - c/w calcitriol  - monitor bmp       Diet: DASH  DVT ppx / GI ppx: Lovenox 40mg daily / Pantoprazole   Activity: ambulate as tolerated, PT/Rehab evaluation  Disposition: from home  Full code

## 2018-11-05 NOTE — CONSULT NOTE ADULT - SUBJECTIVE AND OBJECTIVE BOX
RACHEL SUMMERS  MRN-[id number]    HISTORY OF PRESENT ILLNESS:    83 yr old female with pmhx of HTN, CKD 4, recent DVT/PE (thought provoked by travel, completed 6mo a/c and no longer on Eliquis) CHF s/p PPM presented to ER for cough and chest pain. As per patient, she developed non productive cough for the past 2 days with no fevers. Today morning 3am she woke up with pain in the L flank and b/l knees that is 6-8/10 intermittent and she also had one episode of chest tightness centrally located, non radiating which resolved on its own. She denies any fever, chills, headache, urinary or bowel issues. No recent travel or sick contacts.   Cough now resolved, currently denies shortness of breath    PMH/PSH:  PAST MEDICAL & SURGICAL HISTORY:  Hypertension  Pacemaker  Chronic kidney disease  Artificial pacemaker  VTE (provoked)    ALLERGIES:  Allergies    Keflex (Unknown)    Intolerances      SOCIAL HABITS:  non smoker    FAMILY HISTORY:   FAMILY HISTORY:  No pertinent family history in first degree relatives      REVIEW OF SYSTEM:  Elements of review of systems are negative or non-applicable except as noted above in HPI section.       HOME MEDICATIONS:  atorvastatin 40 mg oral tablet  calcitriol 0.25 mcg oral capsule  Citracal 250 mg + D 200 intl units oral tablet  folic acid 0.4 mg oral tablet  Lasix 40 mg oral tablet  latanoprost 0.005% ophthalmic solution  Lopressor 50 mg oral tablet  omeprazole 40 mg oral delayed release capsule  Percocet 5/325 oral tablet    MEDICATIONS:  MEDICATIONS  (STANDING):  atorvastatin 40 milliGRAM(s) Oral at bedtime  calcitriol   Capsule 0.25 MICROGram(s) Oral daily  folic acid 1 milliGRAM(s) Oral daily  furosemide    Tablet 40 milliGRAM(s) Oral daily  heparin  Injectable 5000 Unit(s) SubCutaneous every 8 hours  influenza   Vaccine 0.5 milliLiter(s) IntraMuscular once  latanoprost 0.005% Ophthalmic Solution 1 Drop(s) Both EYES at bedtime  metoprolol tartrate 50 milliGRAM(s) Oral two times a day  pantoprazole    Tablet 40 milliGRAM(s) Oral before breakfast    MEDICATIONS  (PRN):  morphine  - Injectable 2 milliGRAM(s) IV Push every 4 hours PRN Severe Pain (7 - 10)  oxyCODONE    5 mG/acetaminophen 325 mG 1 Tablet(s) Oral every 6 hours PRN Moderate Pain (4 - 6)        VITALS:   Vital Signs Last 24 Hrs  T(C): 37.2 (2018 20:12), Max: 37.2 (2018 20:12)  T(F): 99 (2018 20:12), Max: 99 (2018 20:12)  HR: 65 (2018 20:12) (65 - 78)  BP: 139/71 (2018 20:12) (111/63 - 158/77)  BP(mean): --  RR: 18 (2018 20:12) (17 - 18)  SpO2: 97% (2018 14:23) (96% - 97%)        PHYSICAL EXAM:    GENERAL: NAD  HEAD:  Atraumatic, Normocephalic  NECK: Supple, No JVD  CHEST/LUNG: Clear to auscultation bilaterally;   HEART: Regular rate and rhythm; No murmurs  ABDOMEN: Soft, Nontender, Nondistended  EXTREMITIES:  Good peripheral Pulses, No clubbing, cyanosis, or edema      LABS:                        9.6    9.65  )-----------( 216      ( 2018 11:32 )             30.6     11-04    138  |  100  |  64<HH>  ----------------------------<  98  5.4<H>   |  22  |  2.3<H>    Ca    9.4      2018 11:32  Mg     2.1     11-    TPro  7.3  /  Alb  4.1  /  TBili  0.3  /  DBili  x   /  AST  50<H>  /  ALT  29  /  AlkPhos  99  11-04    LIVER FUNCTIONS - ( 2018 11:32 )  Alb: 4.1 g/dL / Pro: 7.3 g/dL / ALK PHOS: 99 U/L / ALT: 29 U/L / AST: 50 U/L / GGT: x           CARDIAC MARKERS ( 2018 17:28 )  x     / 0.02 ng/mL / 201 U/L / x     / 4.9 ng/mL  CARDIAC MARKERS ( 2018 11:32 )  x     / <0.01 ng/mL / x     / x     / x          PT/INR - ( 2018 11:32 )   PT: 12.40 sec;   INR: 1.08 ratio             Urinalysis Basic - ( 2018 12:45 )    Color: Yellow / Appearance: Clear / S.015 / pH: x  Gluc: x / Ketone: Negative  / Bili: Negative / Urobili: 0.2 mg/dL   Blood: x / Protein: 100 mg/dL / Nitrite: Negative   Leuk Esterase: Negative / RBC: x / WBC 1-2 /HPF   Sq Epi: x / Non Sq Epi: Few /HPF / Bacteria: x        DIAGNOSTIC STUDIES:    < from: [facility] Duplex Lower Ext Vein Scan, Bilat (18 @ 12:13) >  No evidence of deep venous thrombosis in the bilateral lower extremities.   B/L BAKER'S CYST, RT 4.7 BY 1.1 BY 2.5 CM, LT 0.4 BY 2.4 BY  3.6 CM.    < end of copied text >    < from: CT Abdomen and Pelvis No Cont (18 @ 15:49) >  LOWER CHEST: Bibasilar subsegmental atelectasis. Stable left lower lobe   calcified granuloma. Stable cardiomegaly and partially imaged cardiac   pacing wires.    < end of copied text >

## 2018-11-05 NOTE — CONSULT NOTE ADULT - SUBJECTIVE AND OBJECTIVE BOX
CHIEF COMPLAINT:Patient is a 83y old  Female who presents with a chief complaint of chest pain and SOB (05 Nov 2018 13:47)      HISTORY OF PRESENT ILLNESS:     83 yr old female with past medical history of HTN, CKD 4, DVT/PE ( occurred in April 2018, thought to be provoked by immobilization during flight from [place], completed 6mo a/c and no longer on Eliquis) CHF s/p PPM presented to ER for cough and chest pain. As per patient, she developed non productive cough for the past 2 days with no fevers. Today morning 3am she woke up with pain in the L flank and b/l knees that is 8/10 and intermittent. She said that she initially thought she had some chest tightness as well but thinks that was just part of the severe pain she is experiencing.   She describes the pain as 8/10 localized to the left flank occurring at rest and aggravated by deep breaths. She denies having had any n/v, diaphoresis, shortness of breath or syncope during the episodes. She further denies any fever, chills, headache, urinary or bowel issues. No recent travel or sick contacts.    PAST MEDICAL & SURGICAL HISTORY:  Hypertension  Pacemaker   Chronic kidney disease Stage 4  Artificial pacemaker    FAMILY HISTORY:  No pertinent family history in first degree relatives    Allergies    Keflex (Unknown)      	  Home Medications:  atorvastatin 40 mg oral tablet: 1 tab(s) orally once a day (04 Nov 2018 20:45)  calcitriol 0.25 mcg oral capsule: 1 cap(s) orally once a day (04 Nov 2018 20:45)  Citracal 250 mg + D 200 intl units oral tablet: 1 tab(s) orally once a day (04 Nov 2018 20:46)  folic acid 0.4 mg oral tablet: 1 tab(s) orally once a day (04 Nov 2018 20:46)  Lasix 40 mg oral tablet: 1 tab(s) orally once a day (04 Nov 2018 20:47)  latanoprost 0.005% ophthalmic solution: 1 drop(s) to each affected eye once a day (in the evening) (04 Nov 2018 20:48)  Lopressor 50 mg oral tablet: 1 tab(s) orally 2 times a day (04 Nov 2018 20:47)  omeprazole 40 mg oral delayed release capsule: 1 cap(s) orally once a day (04 Nov 2018 20:47)  Percocet 5/325 oral tablet: 1 tab(s) orally every 6 hours (04 Nov 2018 20:48)    MEDICATIONS  (STANDING):  atorvastatin 40 milliGRAM(s) Oral at bedtime  calcitriol   Capsule 0.25 MICROGram(s) Oral daily  folic acid 1 milliGRAM(s) Oral daily  furosemide    Tablet 40 milliGRAM(s) Oral daily  heparin  Injectable 5000 Unit(s) SubCutaneous every 8 hours  influenza   Vaccine 0.5 milliLiter(s) IntraMuscular once  latanoprost 0.005% Ophthalmic Solution 1 Drop(s) Both EYES at bedtime  levoFLOXacin  Tablet 250 milliGRAM(s) Oral every 24 hours  metoprolol tartrate 50 milliGRAM(s) Oral two times a day  pantoprazole    Tablet 40 milliGRAM(s) Oral before breakfast    MEDICATIONS  (PRN):  morphine  - Injectable 2 milliGRAM(s) IV Push every 4 hours PRN Severe Pain (7 - 10)  oxyCODONE    5 mG/acetaminophen 325 mG 1 Tablet(s) Oral every 6 hours PRN Moderate Pain (4 - 6)        SOCIAL HISTORY:    [.] Non-smoker  [ ] Smoker  [ ] Alcohol     REVIEW OF SYSTEMS:  CONSTITUTIONAL: No fever, weight loss, or fatigue  CARDIOLOGY: See HPI   RESPIRATORY: denies shortness of breath, wheezing   NEUROLOGICAL: NO weakness, no focal deficits to report.  ENDOCRINOLOGICAL: no recent change in diabetic medications.   GI: no BRBPR, no N,V,diarrhea.    PSYCHIATRY: normal mood and affect  HEENT: no nasal discharge, no ecchymosis  SKIN: no ecchymosis, no breakdown  MUSCULOSKELETAL: Full range of motion x4.      PHYSICAL EXAM:  T(C): 37.1 (11-05-18 @ 13:44), Max: 37.1 (11-05-18 @ 13:44)  HR: 68 (11-05-18 @ 13:44) (68 - 78)  BP: 111/63 (11-05-18 @ 13:44) (111/63 - 196/86)  RR: 17 (11-05-18 @ 13:44) (17 - 18)  SpO2: 96% (11-05-18 @ 02:20) (96% - 100%)  Wt(kg): --  I&O's Summary    04 Nov 2018 07:01  -  05 Nov 2018 07:00  --------------------------------------------------------  IN: 0 mL / OUT: 550 mL / NET: -550 mL      Daily Height in cm: 160.02 (04 Nov 2018 20:51)    Daily     General Appearance: Normal	  Cardiovascular: Normal S1 S2, No JVD, No murmurs, No edema  Respiratory: Lungs clear to auscultation	  Psychiatry: not performed  Gastrointestinal:  Soft, Non-tender  Skin: No rashes, No ecchymoses, No cyanosis	  Neurologic: Not performed  Extremities: Normal range of motion, No clubbing, cyanosis or edema  Vascular: Peripheral pulses palpable 2+ bilaterally        LABS:	 	                        9.6    9.65  )-----------( 216      ( 04 Nov 2018 11:32 )             30.6     11-04    138  |  100  |  64<HH>  ----------------------------<  98  5.4<H>   |  22  |  2.3<H>    Ca    9.4      04 Nov 2018 11:32  Mg     2.1     11-04    TPro  7.3  /  Alb  4.1  /  TBili  0.3  /  DBili  x   /  AST  50<H>  /  ALT  29  /  AlkPhos  99  11-04      proBNP: Serum Pro-Brain Natriuretic Peptide (11.04.18 @ 11:32)    Serum Pro-Brain Natriuretic Peptide: 7082 pg/mL  Lipid Profile:   HgA1c:   TSH:       CARDIAC MARKERS:  Troponin T, Serum: <0.01 ng/mL (11-04 @ 11:32)            TELEMETRY EVENTS: none	    ECG:  PPM AV paced. 	  RADIOLOGY:              PREVIOUS DIAGNOSTIC TESTING:    [ ] Echocardiogram:	< from: Transthoracic Echocardiogram (11.05.18 @ 09:56) >    Summary:   1. Left ventricular ejection fraction, by visual estimation, is 55 to   60%.   2. Normal global left ventricular systolic function.   3. Spectral Doppler shows pseudonormal pattern of left ventricular   myocardial filling (Grade II diastolic dysfunction).   4. Mild-to-moderate concentric left ventricular hypertrophy.   5. Mild mitral regurgitation.   6. Mild tricuspid regurgitation.   7. Estimated pulmonary artery systolic pressure is 42.1 mmHg assuming a   right atrial pressure of 8 mmHg, which is consistent with mild pulmonary   hypertension.    [ ]  Catheterization:   [ ] Stress Test: CHIEF COMPLAINT:Patient is a 83y old  Female who presents with a chief complaint of chest pain and SOB (05 Nov 2018 13:47)      HISTORY OF PRESENT ILLNESS:     83 yr old female with past medical history of HTN, CKD 4, DVT/PE ( occurred in April 2018, thought to be provoked by immobilization during flight from [place], completed 6mo a/c and no longer on Eliquis) CHF s/p PPM presented to ER for cough and chest pain. As per patient, she developed non productive cough for the past 2 days with no fevers. Today morning 3am she woke up with pain in the L flank and b/l knees that is 8/10 and intermittent. She said that she initially thought she had some chest tightness as well but thinks that was just part of the severe pain she is experiencing.   She describes the pain as 8/10 localized to the left flank occurring at rest and aggravated by deep breaths. She denies having had any n/v, diaphoresis, shortness of breath or syncope during the episodes. She further denies any fever, chills, headache, urinary or bowel issues. No recent travel or sick contacts.  The patient has extensive cardiac history. The patient has ha known nonischemic CM. This had improved with CRT pacing and medications. She was last cathed on 2014 and has had multiple caths in the past with no obstructive CAD .  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  and cannot be on ACE or ARB secondary to increased potassium     PAST MEDICAL & SURGICAL HISTORY:  Hypertension  Pacemaker   Chronic kidney disease Stage 4  Artificial pacemaker    FAMILY HISTORY:  No pertinent family history in first degree relatives    Allergies    Keflex (Unknown)      	  Home Medications:  atorvastatin 40 mg oral tablet: 1 tab(s) orally once a day (04 Nov 2018 20:45)  calcitriol 0.25 mcg oral capsule: 1 cap(s) orally once a day (04 Nov 2018 20:45)  Citracal 250 mg + D 200 intl units oral tablet: 1 tab(s) orally once a day (04 Nov 2018 20:46)  folic acid 0.4 mg oral tablet: 1 tab(s) orally once a day (04 Nov 2018 20:46)  Lasix 40 mg oral tablet: 1 tab(s) orally once a day (04 Nov 2018 20:47)  latanoprost 0.005% ophthalmic solution: 1 drop(s) to each affected eye once a day (in the evening) (04 Nov 2018 20:48)  Lopressor 50 mg oral tablet: 1 tab(s) orally 2 times a day (04 Nov 2018 20:47)  omeprazole 40 mg oral delayed release capsule: 1 cap(s) orally once a day (04 Nov 2018 20:47)  Percocet 5/325 oral tablet: 1 tab(s) orally every 6 hours (04 Nov 2018 20:48)    MEDICATIONS  (STANDING):  atorvastatin 40 milliGRAM(s) Oral at bedtime  calcitriol   Capsule 0.25 MICROGram(s) Oral daily  folic acid 1 milliGRAM(s) Oral daily  furosemide    Tablet 40 milliGRAM(s) Oral daily  heparin  Injectable 5000 Unit(s) SubCutaneous every 8 hours  influenza   Vaccine 0.5 milliLiter(s) IntraMuscular once  latanoprost 0.005% Ophthalmic Solution 1 Drop(s) Both EYES at bedtime  levoFLOXacin  Tablet 250 milliGRAM(s) Oral every 24 hours  metoprolol tartrate 50 milliGRAM(s) Oral two times a day  pantoprazole    Tablet 40 milliGRAM(s) Oral before breakfast    MEDICATIONS  (PRN):  morphine  - Injectable 2 milliGRAM(s) IV Push every 4 hours PRN Severe Pain (7 - 10)  oxyCODONE    5 mG/acetaminophen 325 mG 1 Tablet(s) Oral every 6 hours PRN Moderate Pain (4 - 6)        SOCIAL HISTORY:    [.] Non-smoker  [ ] Smoker  [ ] Alcohol     REVIEW OF SYSTEMS:  CONSTITUTIONAL: No fever, weight loss, or fatigue  CARDIOLOGY: See HPI   RESPIRATORY: denies shortness of breath, wheezing   NEUROLOGICAL: NO weakness, no focal deficits to report.  ENDOCRINOLOGICAL: no recent change in diabetic medications.   GI: no BRBPR, no N,V,diarrhea.    PSYCHIATRY: normal mood and affect  HEENT: no nasal discharge, no ecchymosis  SKIN: no ecchymosis, no breakdown  MUSCULOSKELETAL: Full range of motion x4.      PHYSICAL EXAM:  T(C): 37.1 (11-05-18 @ 13:44), Max: 37.1 (11-05-18 @ 13:44)  HR: 68 (11-05-18 @ 13:44) (68 - 78)  BP: 111/63 (11-05-18 @ 13:44) (111/63 - 196/86)  RR: 17 (11-05-18 @ 13:44) (17 - 18)  SpO2: 96% (11-05-18 @ 02:20) (96% - 100%)  Wt(kg): --  I&O's Summary    04 Nov 2018 07:01  -  05 Nov 2018 07:00  --------------------------------------------------------  IN: 0 mL / OUT: 550 mL / NET: -550 mL      Daily Height in cm: 160.02 (04 Nov 2018 20:51)    Daily     General Appearance: Normal	  Cardiovascular: Normal S1 S2, No JVD, No murmurs, No edema  Respiratory: Lungs clear to auscultation	  Psychiatry: not performed  Gastrointestinal:  Soft, Non-tender  Skin: No rashes, No ecchymoses, No cyanosis	  Neurologic: Not performed  Extremities: Normal range of motion, No clubbing, cyanosis or edema  Vascular: Peripheral pulses palpable 2+ bilaterally        LABS:	 	                        9.6    9.65  )-----------( 216      ( 04 Nov 2018 11:32 )             30.6     11-04    138  |  100  |  64<HH>  ----------------------------<  98  5.4<H>   |  22  |  2.3<H>    Ca    9.4      04 Nov 2018 11:32  Mg     2.1     11-04    TPro  7.3  /  Alb  4.1  /  TBili  0.3  /  DBili  x   /  AST  50<H>  /  ALT  29  /  AlkPhos  99  11-04      proBNP: Serum Pro-Brain Natriuretic Peptide (11.04.18 @ 11:32)    Serum Pro-Brain Natriuretic Peptide: 7082 pg/mL  Lipid Profile:   HgA1c:   TSH:       CARDIAC MARKERS:  Troponin T, Serum: <0.01 ng/mL (11-04 @ 11:32)            TELEMETRY EVENTS: none	    ECG:  PPM AV paced. 	  RADIOLOGY:              PREVIOUS DIAGNOSTIC TESTING:    [ ] Echocardiogram:	< from: Transthoracic Echocardiogram (11.05.18 @ 09:56) >    Summary:   1. Left ventricular ejection fraction, by visual estimation, is 55 to   60%.   2. Normal global left ventricular systolic function.   3. Spectral Doppler shows pseudonormal pattern of left ventricular   myocardial filling (Grade II diastolic dysfunction).   4. Mild-to-moderate concentric left ventricular hypertrophy.   5. Mild mitral regurgitation.   6. Mild tricuspid regurgitation.   7. Estimated pulmonary artery systolic pressure is 42.1 mmHg assuming a   right atrial pressure of 8 mmHg, which is consistent with mild pulmonary   hypertension.    [ ]  Catheterization:   [ ] Stress Test:

## 2018-11-05 NOTE — PHYSICAL THERAPY INITIAL EVALUATION ADULT - GAIT DEVIATIONS NOTED, PT EVAL
increased time in double stance/decreased maude/decreased weight-shifting ability/decreased step length

## 2018-11-05 NOTE — CONSULT NOTE ADULT - NEGATIVE CARDIOVASCULAR SYMPTOMS
no dyspnea on exertion/chest pain appears to be pleuritic./no paroxysmal nocturnal dyspnea/no orthopnea/no peripheral edema/no palpitations

## 2018-11-05 NOTE — PROGRESS NOTE ADULT - SUBJECTIVE AND OBJECTIVE BOX
Patient is a 83y old  Female who presents with a chief complaint of chest pain and SOB (2018 19:50)      Overnight events:   No acute event overnight. Patient complaints of left flank pain, improved by pain meds.  Patient reports that she has been having this pain chronically.         PAST MEDICAL & SURGICAL HISTORY:  Hypertension  Pacemaker  Chronic kidney disease  Artificial pacemaker      Allergies    Keflex (Unknown)    Intolerances        MEDICATIONS  (STANDING):  atorvastatin 40 milliGRAM(s) Oral at bedtime  calcitriol   Capsule 0.25 MICROGram(s) Oral daily  folic acid 1 milliGRAM(s) Oral daily  furosemide    Tablet 40 milliGRAM(s) Oral daily  heparin  Injectable 5000 Unit(s) SubCutaneous every 8 hours  influenza   Vaccine 0.5 milliLiter(s) IntraMuscular once  latanoprost 0.005% Ophthalmic Solution 1 Drop(s) Both EYES at bedtime  levoFLOXacin  Tablet 250 milliGRAM(s) Oral every 24 hours  metoprolol tartrate 50 milliGRAM(s) Oral two times a day  pantoprazole    Tablet 40 milliGRAM(s) Oral before breakfast    MEDICATIONS  (PRN):  morphine  - Injectable 2 milliGRAM(s) IV Push every 4 hours PRN Severe Pain (7 - 10)  oxyCODONE    5 mG/acetaminophen 325 mG 1 Tablet(s) Oral every 6 hours PRN Moderate Pain (4 - 6)        Vital Signs Last 24 Hrs  T(C): 36.3 (2018 06:07), Max: 36.8 (2018 20:51)  T(F): 97.4 (2018 06:07), Max: 98.3 (2018 20:51)  HR: 78 (2018 06:07) (72 - 78)  BP: 158/77 (2018 06:07) (158/77 - 196/86)  BP(mean): --  RR: 18 (2018 06:07) (18 - 18)  SpO2: 96% (2018 02:20) (96% - 100%)  CAPILLARY BLOOD GLUCOSE        I&O's Summary    2018 07:01  -  2018 07:00  --------------------------------------------------------  IN: 0 mL / OUT: 550 mL / NET: -550 mL        Physical Exam:    -     General : Not in acute distress.    -      HEENT: PEERLA    -      Cardiac: S1 & S2. No murmur/gallop/rubs.     -      Pulm: clear to auscultate b/l lung field.     -      GI: positive BS. No tenderness/rigidity/rebound.     -      Musculoskeletal: no pitting edema.     -      Neuro: A & O3. Non focal.         Labs:                        9.6    9.65  )-----------( 216      ( 2018 11:32 )             30.6                           11    138  |  100  |  64<HH>  ----------------------------<  98  5.4<H>   |  22  |  2.3<H>    Ca    9.4      2018 11:32  Mg     2.1         TPro  7.3  /  Alb  4.1  /  TBili  0.3  /  DBili  x   /  AST  50<H>  /  ALT  29  /  AlkPhos  99      LIVER FUNCTIONS - ( 2018 11:32 )  Alb: 4.1 g/dL / Pro: 7.3 g/dL / ALK PHOS: 99 U/L / ALT: 29 U/L / AST: 50 U/L / GGT: x         PT/INR - ( 2018 11:32 )   PT: 12.40 sec;   INR: 1.08 ratio           CARDIAC MARKERS ( 2018 11:32 )  x     / <0.01 ng/mL / x     / x     / x                               Urinalysis Basic - ( 2018 12:45 )    Color: Yellow / Appearance: Clear / S.015 / pH: x  Gluc: x / Ketone: Negative  / Bili: Negative / Urobili: 0.2 mg/dL   Blood: x / Protein: 100 mg/dL / Nitrite: Negative   Leuk Esterase: Negative / RBC: x / WBC 1-2 /HPF   Sq Epi: x / Non Sq Epi: Few /HPF / Bacteria: x              Imaging:    ECG:

## 2018-11-05 NOTE — CONSULT NOTE ADULT - ASSESSMENT
Cough resolved  Nonspecific complaints of generalized pains  No evidence of pulmonary infection  Calcified Granuloma  Atelectasis  CKD    incentive spirometry q2 hours  no need for abx from the pulmonary standpoint  dvt px  d/w house staff

## 2018-11-05 NOTE — CONSULT NOTE ADULT - ASSESSMENT
Assessment:  ·	Chest pain does not appear to be cardiac in nature. There is no evidence of CHF exacerbation. patient is hemodynamically stable.   ·	 Initial cardia Trops are negative. Second set of Trops pending.  ·	TTE done today showed LV EF of 55-60% and Grade II diastolic dysfunction with mild-moderate LV hypertrophy.     Plan:  ·	Continue the current regimen of Metroprolol, Lasix and Atorvastatin.   ·	Continue with Tele and trend the second set of cardiac Trops. ·	Chest pain does not appear to be cardiac in nature. There is no evidence of CHF exacerbation. patient is hemodynamically stable.   ·	 Initial cardia Trops are negative. Second set of Trops pending.  ·	TTE done today showed LV EF of 55-60% and Grade II diastolic dysfunction with mild-moderate LV hypertrophy.     Plan:  ·	Continue the current regimen of Metroprolol, Lasix and Atorvastatin.   ·	Continue with Tele and trend the second set of cardiac Trops.   ·	PPM interrogation   · ·	Atypical chest pain unlikely ACS. There is no evidence of CHF exacerbation. patient is hemodynamically stable.   ·	 Initial cardia Trops are negative. Second set of Trops pending.  ·	TTE done today showed LV EF of 55-60% and Grade II diastolic dysfunction with mild-moderate LV hypertrophy.   ·	chronic left flank pain unclear etiology     Plan:  ·	Continue the current regimen of Metroprolol, Lasix and Atorvastatin.   ·	Continue with Tele and trend the second set of cardiac Trops.   ·	PPM interrogation ·	Atypical chest pain unlikely secondary to cardiac etiology . It is reproducible and most likely related to musculoskeletal etiology CE are essentially negative . She no has normal LV systolic function   ·	Cough productive of yellow sputum , intersitial opacity in RLL . Rx as per pulmonary . She has also had PE in the past .   ·	CKD , needs a degree of prerenal azotemia to maintain out of HF .   ·	diastolic heart failure stable .   ·	chronic left flank pain unclear etiology   ·	History of PE    Plan:  ·	Continue the current regimen of Metroprolol, Lasix and Atorvastatin.   ·	Pulmonary follow up needed .   ·	EP follow up needed for ICD CRT interrogation

## 2018-11-05 NOTE — DISCHARGE NOTE ADULT - HOSPITAL COURSE
83F with PMHx of HTN, CKD 4, recent DVT/PE (thought provoked by travel, completed 6mo a/c and no longer on Eliquis) CHF s/p PPM presented to ER for cough and chest pain. Left flank pain likely musculoskeletal, Ct abd and UA neg both x 2. Pain management, prn morphine/perocet, lidocaine patch. Patient with some suprapubic pain. Bladder scan <150cc. Will check Pelvic US wnl. Chest pain atypical, acs ruled out. D/c to SNF. 83F with PMHx of HTN, CKD 4, recent DVT/PE (thought provoked by travel, completed 6mo a/c and no longer on Eliquis) CHF s/p PPM presented to ER for cough and chest pain. workup was negative for ACS. her stay was complicated by sigmoid diverticulitis that was treated conservatively with IV antibiotics and hydration with pain control, the patient improved clinically.  she also had left knee pain that thought to be gouty arthritis and she got few doses of solumedrol . xray was negative for any acute pathology.  the patient is stable to be discharged today to SNF

## 2018-11-06 LAB
ANION GAP SERPL CALC-SCNC: 15 MMOL/L — HIGH (ref 7–14)
BASOPHILS # BLD AUTO: 0.03 K/UL — SIGNIFICANT CHANGE UP (ref 0–0.2)
BASOPHILS NFR BLD AUTO: 0.4 % — SIGNIFICANT CHANGE UP (ref 0–1)
BUN SERPL-MCNC: 47 MG/DL — HIGH (ref 10–20)
CALCIUM SERPL-MCNC: 8.7 MG/DL — SIGNIFICANT CHANGE UP (ref 8.5–10.1)
CHLORIDE SERPL-SCNC: 97 MMOL/L — LOW (ref 98–110)
CO2 SERPL-SCNC: 22 MMOL/L — SIGNIFICANT CHANGE UP (ref 17–32)
CREAT SERPL-MCNC: 2.2 MG/DL — HIGH (ref 0.7–1.5)
EOSINOPHIL # BLD AUTO: 0.52 K/UL — SIGNIFICANT CHANGE UP (ref 0–0.7)
EOSINOPHIL NFR BLD AUTO: 6.2 % — SIGNIFICANT CHANGE UP (ref 0–8)
GLUCOSE SERPL-MCNC: 119 MG/DL — HIGH (ref 70–99)
HCT VFR BLD CALC: 28.6 % — LOW (ref 37–47)
HGB BLD-MCNC: 8.9 G/DL — LOW (ref 12–16)
IMM GRANULOCYTES NFR BLD AUTO: 0.5 % — HIGH (ref 0.1–0.3)
LYMPHOCYTES # BLD AUTO: 2.69 K/UL — SIGNIFICANT CHANGE UP (ref 1.2–3.4)
LYMPHOCYTES # BLD AUTO: 32.1 % — SIGNIFICANT CHANGE UP (ref 20.5–51.1)
MAGNESIUM SERPL-MCNC: 2.1 MG/DL — SIGNIFICANT CHANGE UP (ref 1.8–2.4)
MCHC RBC-ENTMCNC: 28.5 PG — SIGNIFICANT CHANGE UP (ref 27–31)
MCHC RBC-ENTMCNC: 31.1 G/DL — LOW (ref 32–37)
MCV RBC AUTO: 91.7 FL — SIGNIFICANT CHANGE UP (ref 81–99)
MONOCYTES # BLD AUTO: 1.36 K/UL — HIGH (ref 0.1–0.6)
MONOCYTES NFR BLD AUTO: 16.2 % — HIGH (ref 1.7–9.3)
NEUTROPHILS # BLD AUTO: 3.74 K/UL — SIGNIFICANT CHANGE UP (ref 1.4–6.5)
NEUTROPHILS NFR BLD AUTO: 44.6 % — SIGNIFICANT CHANGE UP (ref 42.2–75.2)
NRBC # BLD: 0 /100 WBCS — SIGNIFICANT CHANGE UP (ref 0–0)
PLATELET # BLD AUTO: 198 K/UL — SIGNIFICANT CHANGE UP (ref 130–400)
POTASSIUM SERPL-MCNC: 4.8 MMOL/L — SIGNIFICANT CHANGE UP (ref 3.5–5)
POTASSIUM SERPL-SCNC: 4.8 MMOL/L — SIGNIFICANT CHANGE UP (ref 3.5–5)
RBC # BLD: 3.12 M/UL — LOW (ref 4.2–5.4)
RBC # FLD: 13.8 % — SIGNIFICANT CHANGE UP (ref 11.5–14.5)
SODIUM SERPL-SCNC: 134 MMOL/L — LOW (ref 135–146)
WBC # BLD: 8.38 K/UL — SIGNIFICANT CHANGE UP (ref 4.8–10.8)
WBC # FLD AUTO: 8.38 K/UL — SIGNIFICANT CHANGE UP (ref 4.8–10.8)

## 2018-11-06 RX ORDER — ONDANSETRON 8 MG/1
4 TABLET, FILM COATED ORAL ONCE
Qty: 0 | Refills: 0 | Status: COMPLETED | OUTPATIENT
Start: 2018-11-06 | End: 2018-11-06

## 2018-11-06 RX ORDER — LIDOCAINE 4 G/100G
1 CREAM TOPICAL DAILY
Qty: 0 | Refills: 0 | Status: DISCONTINUED | OUTPATIENT
Start: 2018-11-06 | End: 2018-11-20

## 2018-11-06 RX ADMIN — LIDOCAINE 1 PATCH: 4 CREAM TOPICAL at 18:01

## 2018-11-06 RX ADMIN — Medication 50 MILLIGRAM(S): at 17:20

## 2018-11-06 RX ADMIN — Medication 30 MILLILITER(S): at 20:15

## 2018-11-06 RX ADMIN — Medication 50 MILLIGRAM(S): at 06:18

## 2018-11-06 RX ADMIN — HEPARIN SODIUM 5000 UNIT(S): 5000 INJECTION INTRAVENOUS; SUBCUTANEOUS at 14:05

## 2018-11-06 RX ADMIN — ATORVASTATIN CALCIUM 40 MILLIGRAM(S): 80 TABLET, FILM COATED ORAL at 21:57

## 2018-11-06 RX ADMIN — ONDANSETRON 4 MILLIGRAM(S): 8 TABLET, FILM COATED ORAL at 11:32

## 2018-11-06 RX ADMIN — LIDOCAINE 1 PATCH: 4 CREAM TOPICAL at 14:04

## 2018-11-06 RX ADMIN — HEPARIN SODIUM 5000 UNIT(S): 5000 INJECTION INTRAVENOUS; SUBCUTANEOUS at 06:18

## 2018-11-06 RX ADMIN — Medication 1 MILLIGRAM(S): at 11:32

## 2018-11-06 RX ADMIN — CALCITRIOL 0.25 MICROGRAM(S): 0.5 CAPSULE ORAL at 11:32

## 2018-11-06 RX ADMIN — SIMETHICONE 80 MILLIGRAM(S): 80 TABLET, CHEWABLE ORAL at 21:57

## 2018-11-06 RX ADMIN — PANTOPRAZOLE SODIUM 40 MILLIGRAM(S): 20 TABLET, DELAYED RELEASE ORAL at 06:17

## 2018-11-06 RX ADMIN — HEPARIN SODIUM 5000 UNIT(S): 5000 INJECTION INTRAVENOUS; SUBCUTANEOUS at 21:58

## 2018-11-06 RX ADMIN — SIMETHICONE 80 MILLIGRAM(S): 80 TABLET, CHEWABLE ORAL at 06:17

## 2018-11-06 RX ADMIN — MORPHINE SULFATE 2 MILLIGRAM(S): 50 CAPSULE, EXTENDED RELEASE ORAL at 02:44

## 2018-11-06 RX ADMIN — LATANOPROST 1 DROP(S): 0.05 SOLUTION/ DROPS OPHTHALMIC; TOPICAL at 21:57

## 2018-11-06 RX ADMIN — Medication 40 MILLIGRAM(S): at 06:18

## 2018-11-06 RX ADMIN — SIMETHICONE 80 MILLIGRAM(S): 80 TABLET, CHEWABLE ORAL at 14:05

## 2018-11-06 NOTE — PROGRESS NOTE ADULT - ASSESSMENT
83 yr old female with pmhx of HTN, CKD 4, recent DVT/PE (thought provoked by travel, completed 6mo a/c and no longer on Eliquis) CHF s/p PPM presented to ER for cough and chest pain.    Cough unlikely PNA  - Evaluated by pulmonary  - can d/c Antibiotics   - incentive spirometry q2 hours    Chest pain atypical likely musculoskeletal  - Will interrogate ICD   - ACS ruled out  - c/w Tele for now     CHF s/p PPM   - BNP 7000  - c/w lopressor, lasix, statin    HTN  - c/w  lopressor, Lasix    CKD stage 4  - stable (baseline ~ 2.3 - 2.8)  - c/w calcitriol  - monitor bmp    Diet: DASH  DVT ppx / GI ppx: Lovenox 40mg daily / Pantoprazole   Activity: ambulate as tolerated, PT/Rehab evaluation  Disposition: from home  Full code 83 yr old female with pmhx of HTN, CKD 4, recent DVT/PE (thought provoked by travel, completed 6mo a/c and no longer on Eliquis) CHF s/p PPM presented to ER for cough and chest pain.    Cough unlikely PNA  - Evaluated by pulmonary  - can d/c Antibiotics   - incentive spirometry q2 hours    Chest pain atypical likely musculoskeletal  - Will interrogate ICD   - ACS ruled out  - Continue Metroprolol, Lasix and Atorvastatin.     CHF s/p PPM   - BNP 7000  - c/w current meds     HTN  - c/w  lopressor, Lasix    CKD stage 4  - stable (baseline ~ 2.3 - 2.8)  - c/w calcitriol  - monitor bmp    Diet: DASH  DVT ppx / GI ppx: Lovenox 40mg daily / Pantoprazole   Activity: ambulate as tolerated, PT/Rehab evaluation  Disposition: from home  Full code

## 2018-11-06 NOTE — PROGRESS NOTE ADULT - ASSESSMENT
Left flank pain with neg CT abd without contrast and UA with proteinuria only - likely musculoskeletal  no ACS  cough - resolved, off abx  PPM - pt requesting EP eval   CKD 4 - stable  hx of PE / dvt, AC course complete  OA / gout    plan:    off abx  try lidocaine patch to flank  prn morphine sulfate percocet  EP eval  rehab  full code / advance directives reviewed

## 2018-11-06 NOTE — PROGRESS NOTE ADULT - SUBJECTIVE AND OBJECTIVE BOX
IM COVERAGE PROGRESS NOTE    pt seen and examined  still with left flank pain  c/o emesis x 1 today  cardio and pulm inputs noted - EP eval pending  advance care planning and advance care directives reviewed and d/w pt in detail    PAST MEDICAL & SURGICAL HISTORY:  Hypertension  Pacemaker  Chronic kidney disease  Artificial pacemaker    Allergies:  Keflex (Unknown)    Home Medications Reviewed    SOCIAL HISTORY:  Denies ETOH,Smoking,   FAMILY HISTORY:  No pertinent family history in first degree relatives        REVIEW OF SYSTEMS:  All other review of systems is negative unless indicated above.    PHYSICAL EXAM:  NAD  awake and alert  moist mm  no jvd  b/l bs  ppm rrr  soft, nt   no cvat  no edema    Hospital Medications:   MEDICATIONS  (STANDING):  atorvastatin 40 milliGRAM(s) Oral at bedtime  calcitriol   Capsule 0.25 MICROGram(s) Oral daily  folic acid 1 milliGRAM(s) Oral daily  furosemide    Tablet 40 milliGRAM(s) Oral daily  heparin  Injectable 5000 Unit(s) SubCutaneous every 8 hours  influenza   Vaccine 0.5 milliLiter(s) IntraMuscular once  latanoprost 0.005% Ophthalmic Solution 1 Drop(s) Both EYES at bedtime  metoprolol tartrate 50 milliGRAM(s) Oral two times a day  pantoprazole    Tablet 40 milliGRAM(s) Oral before breakfast  simethicone 80 milliGRAM(s) Chew three times a day        VITALS:  T(F): 98.9 (18 @ 05:34), Max: 99 (18 @ 20:12)  HR: 60 (18 @ 05:34)  BP: 179/86 (18 @ 09:04)  RR: 19 (18 @ 05:34)  SpO2: 99% (18 @ 09:04)  Wt(kg): --     @ 07:01  -   @ 07:00  --------------------------------------------------------  IN: 0 mL / OUT: 550 mL / NET: -550 mL     @ 07:01  -   @ 07:00  --------------------------------------------------------  IN: 0 mL / OUT: 900 mL / NET: -900 mL          LABS:      134<L>  |  97<L>  |  47<H>  ----------------------------<  119<H>  4.8   |  22  |  2.2<H>    Ca    8.7      2018 06:31  Mg     2.1                                 8.9    8.38  )-----------( 198      ( 2018 06:31 )             28.6       Urine Studies:  Urinalysis Basic - ( 2018 12:45 )    Color: Yellow / Appearance: Clear / S.015 / pH:   Gluc:  / Ketone: Negative  / Bili: Negative / Urobili: 0.2 mg/dL   Blood:  / Protein: 100 mg/dL / Nitrite: Negative   Leuk Esterase: Negative / RBC:  / WBC 1-2 /HPF   Sq Epi:  / Non Sq Epi: Few /HPF / Bacteria:           RADIOLOGY & ADDITIONAL STUDIES:

## 2018-11-06 NOTE — PROGRESS NOTE ADULT - SUBJECTIVE AND OBJECTIVE BOX
SUBJECTIVE:    Patient is a 83y old Female who presents with a chief complaint of chest pain and SOB (06 Nov 2018 11:59)    Currently admitted to medicine with the primary diagnosis of Other chest pain     Today is hospital day 2d. Pt with atypical CP this AM, unchanged EKG. Likely Msk     PAST MEDICAL & SURGICAL HISTORY  Hypertension  Pacemaker  Chronic kidney disease  Artificial pacemaker    SOCIAL HISTORY:  Negative for smoking/alcohol/drug use.     ALLERGIES:  Keflex (Unknown)    MEDICATIONS:  STANDING MEDICATIONS  atorvastatin 40 milliGRAM(s) Oral at bedtime  calcitriol   Capsule 0.25 MICROGram(s) Oral daily  folic acid 1 milliGRAM(s) Oral daily  furosemide    Tablet 40 milliGRAM(s) Oral daily  heparin  Injectable 5000 Unit(s) SubCutaneous every 8 hours  influenza   Vaccine 0.5 milliLiter(s) IntraMuscular once  latanoprost 0.005% Ophthalmic Solution 1 Drop(s) Both EYES at bedtime  lidocaine   Patch 1 Patch Transdermal daily  metoprolol tartrate 50 milliGRAM(s) Oral two times a day  pantoprazole    Tablet 40 milliGRAM(s) Oral before breakfast  simethicone 80 milliGRAM(s) Chew three times a day    PRN MEDICATIONS  morphine  - Injectable 2 milliGRAM(s) IV Push every 4 hours PRN  oxyCODONE    5 mG/acetaminophen 325 mG 1 Tablet(s) Oral every 6 hours PRN    VITALS:   T(F): 98.9  HR: 60  BP: 179/86  RR: 19  SpO2: 99%    LABS:                        8.9    8.38  )-----------( 198      ( 06 Nov 2018 06:31 )             28.6     11-06    134<L>  |  97<L>  |  47<H>  ----------------------------<  119<H>  4.8   |  22  |  2.2<H>    Ca    8.7      06 Nov 2018 06:31  Mg     2.1     11-06      Troponin T, Serum: 0.01 ng/mL (11-05-18 @ 20:28)  Creatine Kinase, Serum: 195 U/L (11-05-18 @ 20:28)  Troponin T, Serum: 0.02 ng/mL <H> (11-05-18 @ 17:28)  Creatine Kinase, Serum: 201 U/L (11-05-18 @ 17:28)      CARDIAC MARKERS ( 05 Nov 2018 20:28 )  x     / 0.01 ng/mL / 195 U/L / x     / 5.1 ng/mL  CARDIAC MARKERS ( 05 Nov 2018 17:28 )  x     / 0.02 ng/mL / 201 U/L / x     / 4.9 ng/mL      RADIOLOGY:    PHYSICAL EXAM:  GEN: No acute distress  LUNGS: Clear to auscultation bilaterally   HEART: S1/S2 present. RRR.   ABD: Soft, non-tender, non-distended. Bowel sounds present  EXT: NC/NC/NE/2+PP/JUÁREZ  NEURO: AAOX3    +

## 2018-11-07 LAB
ANION GAP SERPL CALC-SCNC: 12 MMOL/L — SIGNIFICANT CHANGE UP (ref 7–14)
APPEARANCE UR: CLEAR — SIGNIFICANT CHANGE UP
BASOPHILS # BLD AUTO: 0.03 K/UL — SIGNIFICANT CHANGE UP (ref 0–0.2)
BASOPHILS NFR BLD AUTO: 0.3 % — SIGNIFICANT CHANGE UP (ref 0–1)
BILIRUB UR-MCNC: NEGATIVE — SIGNIFICANT CHANGE UP
BUN SERPL-MCNC: 45 MG/DL — HIGH (ref 10–20)
CALCIUM SERPL-MCNC: 8.6 MG/DL — SIGNIFICANT CHANGE UP (ref 8.5–10.1)
CHLORIDE SERPL-SCNC: 98 MMOL/L — SIGNIFICANT CHANGE UP (ref 98–110)
CO2 SERPL-SCNC: 24 MMOL/L — SIGNIFICANT CHANGE UP (ref 17–32)
COLOR SPEC: YELLOW — SIGNIFICANT CHANGE UP
CREAT SERPL-MCNC: 2.2 MG/DL — HIGH (ref 0.7–1.5)
DIFF PNL FLD: NEGATIVE — SIGNIFICANT CHANGE UP
EOSINOPHIL # BLD AUTO: 0.67 K/UL — SIGNIFICANT CHANGE UP (ref 0–0.7)
EOSINOPHIL NFR BLD AUTO: 7.3 % — SIGNIFICANT CHANGE UP (ref 0–8)
GLUCOSE SERPL-MCNC: 101 MG/DL — HIGH (ref 70–99)
GLUCOSE UR QL: NEGATIVE — SIGNIFICANT CHANGE UP
HCT VFR BLD CALC: 30.1 % — LOW (ref 37–47)
HGB BLD-MCNC: 9.4 G/DL — LOW (ref 12–16)
IMM GRANULOCYTES NFR BLD AUTO: 0.3 % — SIGNIFICANT CHANGE UP (ref 0.1–0.3)
KETONES UR-MCNC: NEGATIVE — SIGNIFICANT CHANGE UP
LEUKOCYTE ESTERASE UR-ACNC: NEGATIVE — SIGNIFICANT CHANGE UP
LYMPHOCYTES # BLD AUTO: 2.26 K/UL — SIGNIFICANT CHANGE UP (ref 1.2–3.4)
LYMPHOCYTES # BLD AUTO: 24.6 % — SIGNIFICANT CHANGE UP (ref 20.5–51.1)
MCHC RBC-ENTMCNC: 28.7 PG — SIGNIFICANT CHANGE UP (ref 27–31)
MCHC RBC-ENTMCNC: 31.2 G/DL — LOW (ref 32–37)
MCV RBC AUTO: 92 FL — SIGNIFICANT CHANGE UP (ref 81–99)
MONOCYTES # BLD AUTO: 1.39 K/UL — HIGH (ref 0.1–0.6)
MONOCYTES NFR BLD AUTO: 15.1 % — HIGH (ref 1.7–9.3)
NEUTROPHILS # BLD AUTO: 4.8 K/UL — SIGNIFICANT CHANGE UP (ref 1.4–6.5)
NEUTROPHILS NFR BLD AUTO: 52.4 % — SIGNIFICANT CHANGE UP (ref 42.2–75.2)
NITRITE UR-MCNC: NEGATIVE — SIGNIFICANT CHANGE UP
NRBC # BLD: 0 /100 WBCS — SIGNIFICANT CHANGE UP (ref 0–0)
PH UR: 6 — SIGNIFICANT CHANGE UP (ref 5–8)
PLATELET # BLD AUTO: 192 K/UL — SIGNIFICANT CHANGE UP (ref 130–400)
POTASSIUM SERPL-MCNC: 5.3 MMOL/L — HIGH (ref 3.5–5)
POTASSIUM SERPL-SCNC: 5.3 MMOL/L — HIGH (ref 3.5–5)
PROT UR-MCNC: NEGATIVE — SIGNIFICANT CHANGE UP
RBC # BLD: 3.27 M/UL — LOW (ref 4.2–5.4)
RBC # FLD: 13.7 % — SIGNIFICANT CHANGE UP (ref 11.5–14.5)
SODIUM SERPL-SCNC: 134 MMOL/L — LOW (ref 135–146)
SP GR SPEC: 1.01 — SIGNIFICANT CHANGE UP (ref 1.01–1.03)
UROBILINOGEN FLD QL: 0.2 — SIGNIFICANT CHANGE UP (ref 0.2–0.2)
WBC # BLD: 9.18 K/UL — SIGNIFICANT CHANGE UP (ref 4.8–10.8)
WBC # FLD AUTO: 9.18 K/UL — SIGNIFICANT CHANGE UP (ref 4.8–10.8)

## 2018-11-07 RX ORDER — ACETAMINOPHEN 500 MG
650 TABLET ORAL EVERY 6 HOURS
Qty: 0 | Refills: 0 | Status: DISCONTINUED | OUTPATIENT
Start: 2018-11-07 | End: 2018-11-20

## 2018-11-07 RX ADMIN — ATORVASTATIN CALCIUM 40 MILLIGRAM(S): 80 TABLET, FILM COATED ORAL at 22:04

## 2018-11-07 RX ADMIN — LIDOCAINE 1 PATCH: 4 CREAM TOPICAL at 22:28

## 2018-11-07 RX ADMIN — MORPHINE SULFATE 2 MILLIGRAM(S): 50 CAPSULE, EXTENDED RELEASE ORAL at 06:22

## 2018-11-07 RX ADMIN — HEPARIN SODIUM 5000 UNIT(S): 5000 INJECTION INTRAVENOUS; SUBCUTANEOUS at 05:52

## 2018-11-07 RX ADMIN — HEPARIN SODIUM 5000 UNIT(S): 5000 INJECTION INTRAVENOUS; SUBCUTANEOUS at 22:04

## 2018-11-07 RX ADMIN — SIMETHICONE 80 MILLIGRAM(S): 80 TABLET, CHEWABLE ORAL at 05:51

## 2018-11-07 RX ADMIN — LATANOPROST 1 DROP(S): 0.05 SOLUTION/ DROPS OPHTHALMIC; TOPICAL at 22:05

## 2018-11-07 RX ADMIN — Medication 1 MILLIGRAM(S): at 13:13

## 2018-11-07 RX ADMIN — PANTOPRAZOLE SODIUM 40 MILLIGRAM(S): 20 TABLET, DELAYED RELEASE ORAL at 05:51

## 2018-11-07 RX ADMIN — MORPHINE SULFATE 2 MILLIGRAM(S): 50 CAPSULE, EXTENDED RELEASE ORAL at 04:59

## 2018-11-07 RX ADMIN — Medication 650 MILLIGRAM(S): at 04:44

## 2018-11-07 RX ADMIN — Medication 650 MILLIGRAM(S): at 06:22

## 2018-11-07 RX ADMIN — SIMETHICONE 80 MILLIGRAM(S): 80 TABLET, CHEWABLE ORAL at 13:14

## 2018-11-07 RX ADMIN — OXYCODONE AND ACETAMINOPHEN 1 TABLET(S): 5; 325 TABLET ORAL at 10:32

## 2018-11-07 RX ADMIN — OXYCODONE AND ACETAMINOPHEN 1 TABLET(S): 5; 325 TABLET ORAL at 09:17

## 2018-11-07 RX ADMIN — MORPHINE SULFATE 2 MILLIGRAM(S): 50 CAPSULE, EXTENDED RELEASE ORAL at 21:29

## 2018-11-07 RX ADMIN — Medication 50 MILLIGRAM(S): at 05:52

## 2018-11-07 RX ADMIN — CALCITRIOL 0.25 MICROGRAM(S): 0.5 CAPSULE ORAL at 13:13

## 2018-11-07 RX ADMIN — HEPARIN SODIUM 5000 UNIT(S): 5000 INJECTION INTRAVENOUS; SUBCUTANEOUS at 13:14

## 2018-11-07 RX ADMIN — Medication 40 MILLIGRAM(S): at 05:52

## 2018-11-07 RX ADMIN — Medication 50 MILLIGRAM(S): at 18:09

## 2018-11-07 RX ADMIN — LIDOCAINE 1 PATCH: 4 CREAM TOPICAL at 13:13

## 2018-11-07 RX ADMIN — LIDOCAINE 1 PATCH: 4 CREAM TOPICAL at 04:58

## 2018-11-07 RX ADMIN — SIMETHICONE 80 MILLIGRAM(S): 80 TABLET, CHEWABLE ORAL at 22:04

## 2018-11-07 NOTE — PROGRESS NOTE ADULT - SUBJECTIVE AND OBJECTIVE BOX
SUBJECTIVE:    Patient is a 83y old Female who presents with a chief complaint of chest pain and SOB (07 Nov 2018 10:35)    Currently admitted to medicine with the primary diagnosis of Other chest pain     Today is hospital day 3d. No acute overnight events.       PAST MEDICAL & SURGICAL HISTORY  Hypertension  Pacemaker  Chronic kidney disease  Artificial pacemaker    SOCIAL HISTORY:  Negative for smoking/alcohol/drug use.     ALLERGIES:  Keflex (Unknown)    MEDICATIONS:  STANDING MEDICATIONS  atorvastatin 40 milliGRAM(s) Oral at bedtime  calcitriol   Capsule 0.25 MICROGram(s) Oral daily  folic acid 1 milliGRAM(s) Oral daily  furosemide    Tablet 40 milliGRAM(s) Oral daily  heparin  Injectable 5000 Unit(s) SubCutaneous every 8 hours  influenza   Vaccine 0.5 milliLiter(s) IntraMuscular once  latanoprost 0.005% Ophthalmic Solution 1 Drop(s) Both EYES at bedtime  lidocaine   Patch 1 Patch Transdermal daily  metoprolol tartrate 50 milliGRAM(s) Oral two times a day  pantoprazole    Tablet 40 milliGRAM(s) Oral before breakfast  simethicone 80 milliGRAM(s) Chew three times a day    PRN MEDICATIONS  acetaminophen   Tablet .. 650 milliGRAM(s) Oral every 6 hours PRN  morphine  - Injectable 2 milliGRAM(s) IV Push every 4 hours PRN  oxyCODONE    5 mG/acetaminophen 325 mG 1 Tablet(s) Oral every 6 hours PRN    VITALS:   T(F): 96.3  HR: 61  BP: 151/69  RR: 20  SpO2: --    LABS:                        9.4    9.18  )-----------( 192      ( 07 Nov 2018 06:49 )             30.1     11-07    134<L>  |  98  |  45<H>  ----------------------------<  101<H>  5.3<H>   |  24  |  2.2<H>    Ca    8.6      07 Nov 2018 06:49  Mg     2.1     11-06      CARDIAC MARKERS ( 05 Nov 2018 20:28 )  x     / 0.01 ng/mL / 195 U/L / x     / 5.1 ng/mL  CARDIAC MARKERS ( 05 Nov 2018 17:28 )  x     / 0.02 ng/mL / 201 U/L / x     / 4.9 ng/mL      RADIOLOGY:    < from: CT Abdomen and Pelvis No Cont (11.04.18 @ 15:49) >    IMPRESSION:        No acute abnormality the abdomen and pelvis. No nephrolithiasis.    < end of copied text >      PHYSICAL EXAM:  GEN: No acute distress  LUNGS: Clear to auscultation bilaterally   HEART: S1/S2 present. RRR.   ABD: Soft, non-tender, non-distended. Bowel sounds present  EXT: NC/NC/NE/2+PP/JUÁREZ  NEURO: AAOX3 SUBJECTIVE:    Patient is a 83y old Female who presents with a chief complaint of chest pain and SOB (07 Nov 2018 10:35)    Currently admitted to medicine with the primary diagnosis of Other chest pain     Today is hospital day 3d. No acute overnight events.       PAST MEDICAL & SURGICAL HISTORY  Hypertension  Pacemaker  Chronic kidney disease  Artificial pacemaker    SOCIAL HISTORY:  Negative for smoking/alcohol/drug use.     ALLERGIES:  Keflex (Unknown)    MEDICATIONS:  STANDING MEDICATIONS  atorvastatin 40 milliGRAM(s) Oral at bedtime  calcitriol   Capsule 0.25 MICROGram(s) Oral daily  folic acid 1 milliGRAM(s) Oral daily  furosemide    Tablet 40 milliGRAM(s) Oral daily  heparin  Injectable 5000 Unit(s) SubCutaneous every 8 hours  influenza   Vaccine 0.5 milliLiter(s) IntraMuscular once  latanoprost 0.005% Ophthalmic Solution 1 Drop(s) Both EYES at bedtime  lidocaine   Patch 1 Patch Transdermal daily  metoprolol tartrate 50 milliGRAM(s) Oral two times a day  pantoprazole    Tablet 40 milliGRAM(s) Oral before breakfast  simethicone 80 milliGRAM(s) Chew three times a day    PRN MEDICATIONS  acetaminophen   Tablet .. 650 milliGRAM(s) Oral every 6 hours PRN  morphine  - Injectable 2 milliGRAM(s) IV Push every 4 hours PRN  oxyCODONE    5 mG/acetaminophen 325 mG 1 Tablet(s) Oral every 6 hours PRN    VITALS:   T(F): 96.3  HR: 61  BP: 151/69  RR: 20  SpO2: --    LABS:                        9.4    9.18  )-----------( 192      ( 07 Nov 2018 06:49 )             30.1     11-07    134<L>  |  98  |  45<H>  ----------------------------<  101<H>  5.3<H>   |  24  |  2.2<H>    Ca    8.6      07 Nov 2018 06:49  Mg     2.1     11-06      CARDIAC MARKERS ( 05 Nov 2018 20:28 )  x     / 0.01 ng/mL / 195 U/L / x     / 5.1 ng/mL  CARDIAC MARKERS ( 05 Nov 2018 17:28 )  x     / 0.02 ng/mL / 201 U/L / x     / 4.9 ng/mL      RADIOLOGY:    < from: CT Abdomen and Pelvis No Cont (11.04.18 @ 15:49) >    IMPRESSION:        No acute abnormality the abdomen and pelvis. No nephrolithiasis.    < end of copied text >    < from: Transthoracic Echocardiogram (11.05.18 @ 09:56) >  Summary:   1. Left ventricular ejection fraction, by visual estimation, is 55 to   60%.   2. Normal global left ventricular systolic function.   3. Spectral Doppler shows pseudonormal pattern of left ventricular   myocardial filling (Grade II diastolic dysfunction).   4. Mild-to-moderate concentric left ventricular hypertrophy.   5. Mild mitral regurgitation.   6. Mild tricuspid regurgitation.   7. Estimated pulmonary artery systolic pressure is 42.1 mmHg assuming a   right atrial pressure of 8 mmHg, which is consistent with mild pulmonary   hypertension.      < end of copied text >      PHYSICAL EXAM:  GEN: No acute distress  LUNGS: Clear to auscultation bilaterally   HEART: S1/S2 present. RRR.   ABD: Soft, non-tender, non-distended. Bowel sounds present  EXT: NC/NC/NE/2+PP/JUÁREZ  NEURO: AAOX3

## 2018-11-07 NOTE — PROGRESS NOTE ADULT - ASSESSMENT
Left flank pain - unclear etiology - musculoskeletal? - ct abd and UA neg both x 2   cough - resolved, off abx  PPM   CKD 4 - stable  hx of PE / dvt, AC course complete  OA / gout / RA    plan:    off abx  lidocaine to flank  prn morphine sulfate /  percocet  await PPM interrogation  simethicone  oob ambulate with assistance  dc planning to home with services

## 2018-11-07 NOTE — PROGRESS NOTE ADULT - ASSESSMENT
Cough resolved  Calcified Granuloma  Atelectasis  CKD    incentive spirometry   nephrology follow up  ce negative  dvt px  d/c planning

## 2018-11-07 NOTE — PROGRESS NOTE ADULT - ASSESSMENT
83 yr old female with pmhx of HTN, CKD 4, recent DVT/PE (thought provoked by travel, completed 6mo a/c and no longer on Eliquis) CHF s/p PPM presented to ER for cough and chest pain.    Chest pain atypical likely musculoskeletal  - ICD interrogation is pending    - ACS ruled out  - Continue Metroprolol, Lasix and Atorvastatin.     Left flank pain likely musculoskeletal  - Ct abd and UA neg both x 2   - Pain management, prn morphine/perocet  - lidocaine patch     Cough unlikely PNA  - Evaluated by pulmonary  - can d/c Antibiotics   - incentive spirometry q2 hours    CHF s/p PPM   - BNP 7000  - c/w current meds     HTN  - c/w  lopressor, Lasix    CKD stage 4  - stable (baseline ~ 2.3 - 2.8)  - c/w calcitriol  - monitor bmp    Diet: DASH  DVT ppx / GI ppx: Lovenox 40mg daily / Pantoprazole   Activity: ambulate as tolerated, PT/Rehab evaluation  Disposition: from home  Full code 83 yr old female with pmhx of HTN, CKD 4, recent DVT/PE (thought provoked by travel, completed 6mo a/c and no longer on Eliquis) CHF s/p PPM presented to ER for cough and chest pain.    Chest pain atypical likely musculoskeletal  - ICD interrogation is pending    - ACS ruled out  - Continue Metroprolol, Lasix and Atorvastatin.     Left flank pain likely musculoskeletal  - Ct abd and UA neg both x 2   - Pain management, prn morphine/perocet  - lidocaine patch     Cough unlikely PNA  - Evaluated by pulmonary  - can d/c Antibiotics   - incentive spirometry q2 hours    CHF s/p PPM   - BNP 7000  - c/w current meds   - GIIDD on transthoracic    HTN  - c/w  lopressor, Lasix    CKD stage 4  - stable (baseline ~ 2.3 - 2.8)  - c/w calcitriol  - monitor bmp    Diet: DASH  DVT ppx / GI ppx: Lovenox 40mg daily / Pantoprazole   Activity: ambulate as tolerated, PT/Rehab evaluation  Disposition: from home  Full code

## 2018-11-07 NOTE — PROGRESS NOTE ADULT - SUBJECTIVE AND OBJECTIVE BOX
LATE ENTRY 11/06/18      RACHEL SUMMERS  83y, Female  Allergy: Keflex (Unknown)      LAST 24-Hr EVENTS:  pt seen examined   sitting up in chair  no cough no shortness of breath    VITALS:  T(F): 97 (11-07-18 @ 06:20), Max: 97.4 (11-06-18 @ 20:15)  HR: 60 (11-07-18 @ 06:20)  BP: 133/62 (11-07-18 @ 06:20) (133/62 - 179/86)  RR: 18 (11-07-18 @ 06:20)  SpO2: 99% (11-06-18 @ 09:04)    PHYSICAL EXAM:    GENERAL: NAD  NECK: Supple, No JVD  CHEST/LUNG: CTA b/l  HEART: Regular rate and rhythm  ABDOMEN: Soft, Nontender, Nondistended  EXTREMITIES:  No clubbing, edema absent        TESTS & MEASUREMENTS:    IN: 0 mL / OUT: 900 mL / NET: -900 mL    IN: 240 mL / OUT: 700 mL / NET: -460 mL                            9.4    9.18  )-----------( 192      ( 07 Nov 2018 06:49 )             30.1       11-07    134<L>  |  98  |  45<H>  ----------------------------<  101<H>  5.3<H>   |  24  |  2.2<H>    Ca    8.6      07 Nov 2018 06:49  Mg     2.1     11-06        CARDIAC MARKERS ( 05 Nov 2018 20:28 )  x     / 0.01 ng/mL / 195 U/L / x     / 5.1 ng/mL  CARDIAC MARKERS ( 05 Nov 2018 17:28 )  x     / 0.02 ng/mL / 201 U/L / x     / 4.9 ng/mL            MEDICATIONS:  MEDICATIONS  (STANDING):  atorvastatin 40 milliGRAM(s) Oral at bedtime  calcitriol   Capsule 0.25 MICROGram(s) Oral daily  folic acid 1 milliGRAM(s) Oral daily  furosemide    Tablet 40 milliGRAM(s) Oral daily  heparin  Injectable 5000 Unit(s) SubCutaneous every 8 hours  influenza   Vaccine 0.5 milliLiter(s) IntraMuscular once  latanoprost 0.005% Ophthalmic Solution 1 Drop(s) Both EYES at bedtime  lidocaine   Patch 1 Patch Transdermal daily  metoprolol tartrate 50 milliGRAM(s) Oral two times a day  pantoprazole    Tablet 40 milliGRAM(s) Oral before breakfast  simethicone 80 milliGRAM(s) Chew three times a day    MEDICATIONS  (PRN):  acetaminophen   Tablet .. 650 milliGRAM(s) Oral every 6 hours PRN Temp greater or equal to 38C (100.4F), Mild Pain (1 - 3)  morphine  - Injectable 2 milliGRAM(s) IV Push every 4 hours PRN Severe Pain (7 - 10)  oxyCODONE    5 mG/acetaminophen 325 mG 1 Tablet(s) Oral every 6 hours PRN Moderate Pain (4 - 6)

## 2018-11-07 NOTE — PROGRESS NOTE ADULT - SUBJECTIVE AND OBJECTIVE BOX
IM COVERAGE PROGRESS NOTE    pt seen and examined  still with left flank pain, worse with full bladder, worse with position change.  Also with bloating and belching  EP eval and icd interrogation still pending    PAST MEDICAL & SURGICAL HISTORY:  Hypertension  Pacemaker  Chronic kidney disease  Artificial pacemaker    Allergies:  Keflex (Unknown)    Home Medications Reviewed    SOCIAL HISTORY:  Denies ETOH,Smoking,   FAMILY HISTORY:  No pertinent family history in first degree relatives        REVIEW OF SYSTEMS:  All other review of systems is negative unless indicated above.    PHYSICAL EXAM:  NAD  awake and alert  moist mm  no jvd  b/l bs  ppm rrr  soft, nt   no cvat  no edema    Hospital Medications:   MEDICATIONS  (STANDING):  atorvastatin 40 milliGRAM(s) Oral at bedtime  calcitriol   Capsule 0.25 MICROGram(s) Oral daily  folic acid 1 milliGRAM(s) Oral daily  furosemide    Tablet 40 milliGRAM(s) Oral daily  heparin  Injectable 5000 Unit(s) SubCutaneous every 8 hours  influenza   Vaccine 0.5 milliLiter(s) IntraMuscular once  latanoprost 0.005% Ophthalmic Solution 1 Drop(s) Both EYES at bedtime  lidocaine   Patch 1 Patch Transdermal daily  metoprolol tartrate 50 milliGRAM(s) Oral two times a day  pantoprazole    Tablet 40 milliGRAM(s) Oral before breakfast  simethicone 80 milliGRAM(s) Chew three times a day        VITALS:  T(F): 97 (18 @ 06:20), Max: 97.4 (18 @ 20:15)  HR: 60 (18 @ 06:20)  BP: 133/62 (18 @ 06:20)  RR: 18 (18 @ 06:20)  SpO2: --  Wt(kg): --     @ 07:  -   @ 07:00  --------------------------------------------------------  IN: 0 mL / OUT: 900 mL / NET: -900 mL     @ 07:01  -   @ 07:00  --------------------------------------------------------  IN: 240 mL / OUT: 700 mL / NET: -460 mL     @ 07:01  -   @ 10:37  --------------------------------------------------------  IN: 0 mL / OUT: 300 mL / NET: -300 mL          LABS:      134<L>  |  98  |  45<H>  ----------------------------<  101<H>  5.3<H>   |  24  |  2.2<H>    Ca    8.6      2018 06:49  Mg     2.1                                 9.4    9.18  )-----------( 192      ( 2018 06:49 )             30.1       Urine Studies:  Urinalysis Basic - ( 2018 12:45 )    Color: Yellow / Appearance: Clear / S.015 / pH:   Gluc:  / Ketone: Negative  / Bili: Negative / Urobili: 0.2 mg/dL   Blood:  / Protein: 100 mg/dL / Nitrite: Negative   Leuk Esterase: Negative / RBC:  / WBC 1-2 /HPF   Sq Epi:  / Non Sq Epi: Few /HPF / Bacteria:           RADIOLOGY & ADDITIONAL STUDIES:            RADIOLOGY & ADDITIONAL STUDIES:

## 2018-11-08 DIAGNOSIS — N18.9 CHRONIC KIDNEY DISEASE, UNSPECIFIED: ICD-10-CM

## 2018-11-08 DIAGNOSIS — R06.02 SHORTNESS OF BREATH: ICD-10-CM

## 2018-11-08 DIAGNOSIS — I10 ESSENTIAL (PRIMARY) HYPERTENSION: ICD-10-CM

## 2018-11-08 DIAGNOSIS — M25.561 PAIN IN RIGHT KNEE: ICD-10-CM

## 2018-11-08 DIAGNOSIS — R07.89 OTHER CHEST PAIN: ICD-10-CM

## 2018-11-08 DIAGNOSIS — R10.9 UNSPECIFIED ABDOMINAL PAIN: ICD-10-CM

## 2018-11-08 LAB
ANION GAP SERPL CALC-SCNC: 10 MMOL/L — SIGNIFICANT CHANGE UP (ref 7–14)
BASOPHILS # BLD AUTO: 0.03 K/UL — SIGNIFICANT CHANGE UP (ref 0–0.2)
BASOPHILS NFR BLD AUTO: 0.3 % — SIGNIFICANT CHANGE UP (ref 0–1)
BUN SERPL-MCNC: 53 MG/DL — HIGH (ref 10–20)
CALCIUM SERPL-MCNC: 9.7 MG/DL — SIGNIFICANT CHANGE UP (ref 8.5–10.1)
CHLORIDE SERPL-SCNC: 96 MMOL/L — LOW (ref 98–110)
CO2 SERPL-SCNC: 29 MMOL/L — SIGNIFICANT CHANGE UP (ref 17–32)
CREAT SERPL-MCNC: 2.3 MG/DL — HIGH (ref 0.7–1.5)
EOSINOPHIL # BLD AUTO: 0.68 K/UL — SIGNIFICANT CHANGE UP (ref 0–0.7)
EOSINOPHIL NFR BLD AUTO: 6.7 % — SIGNIFICANT CHANGE UP (ref 0–8)
GLUCOSE SERPL-MCNC: 100 MG/DL — HIGH (ref 70–99)
HCT VFR BLD CALC: 32 % — LOW (ref 37–47)
HGB BLD-MCNC: 9.8 G/DL — LOW (ref 12–16)
IMM GRANULOCYTES NFR BLD AUTO: 0.4 % — HIGH (ref 0.1–0.3)
LYMPHOCYTES # BLD AUTO: 2.52 K/UL — SIGNIFICANT CHANGE UP (ref 1.2–3.4)
LYMPHOCYTES # BLD AUTO: 24.9 % — SIGNIFICANT CHANGE UP (ref 20.5–51.1)
MCHC RBC-ENTMCNC: 28.6 PG — SIGNIFICANT CHANGE UP (ref 27–31)
MCHC RBC-ENTMCNC: 30.6 G/DL — LOW (ref 32–37)
MCV RBC AUTO: 93.3 FL — SIGNIFICANT CHANGE UP (ref 81–99)
MONOCYTES # BLD AUTO: 1.46 K/UL — HIGH (ref 0.1–0.6)
MONOCYTES NFR BLD AUTO: 14.4 % — HIGH (ref 1.7–9.3)
NEUTROPHILS # BLD AUTO: 5.4 K/UL — SIGNIFICANT CHANGE UP (ref 1.4–6.5)
NEUTROPHILS NFR BLD AUTO: 53.3 % — SIGNIFICANT CHANGE UP (ref 42.2–75.2)
NRBC # BLD: 0 /100 WBCS — SIGNIFICANT CHANGE UP (ref 0–0)
PLATELET # BLD AUTO: 231 K/UL — SIGNIFICANT CHANGE UP (ref 130–400)
POTASSIUM SERPL-MCNC: 5.7 MMOL/L — HIGH (ref 3.5–5)
POTASSIUM SERPL-SCNC: 5.7 MMOL/L — HIGH (ref 3.5–5)
RBC # BLD: 3.43 M/UL — LOW (ref 4.2–5.4)
RBC # FLD: 13.6 % — SIGNIFICANT CHANGE UP (ref 11.5–14.5)
SODIUM SERPL-SCNC: 135 MMOL/L — SIGNIFICANT CHANGE UP (ref 135–146)
WBC # BLD: 10.13 K/UL — SIGNIFICANT CHANGE UP (ref 4.8–10.8)
WBC # FLD AUTO: 10.13 K/UL — SIGNIFICANT CHANGE UP (ref 4.8–10.8)

## 2018-11-08 RX ORDER — INSULIN HUMAN 100 [IU]/ML
10 INJECTION, SOLUTION SUBCUTANEOUS ONCE
Qty: 0 | Refills: 0 | Status: DISCONTINUED | OUTPATIENT
Start: 2018-11-08 | End: 2018-11-20

## 2018-11-08 RX ORDER — INSULIN HUMAN 100 [IU]/ML
10 INJECTION, SOLUTION SUBCUTANEOUS ONCE
Qty: 0 | Refills: 0 | Status: COMPLETED | OUTPATIENT
Start: 2018-11-08 | End: 2018-11-08

## 2018-11-08 RX ORDER — DEXTROSE 50 % IN WATER 50 %
50 SYRINGE (ML) INTRAVENOUS ONCE
Qty: 0 | Refills: 0 | Status: COMPLETED | OUTPATIENT
Start: 2018-11-08 | End: 2018-11-08

## 2018-11-08 RX ADMIN — Medication 1 MILLIGRAM(S): at 11:01

## 2018-11-08 RX ADMIN — MORPHINE SULFATE 2 MILLIGRAM(S): 50 CAPSULE, EXTENDED RELEASE ORAL at 10:48

## 2018-11-08 RX ADMIN — MORPHINE SULFATE 2 MILLIGRAM(S): 50 CAPSULE, EXTENDED RELEASE ORAL at 17:32

## 2018-11-08 RX ADMIN — Medication 40 MILLIGRAM(S): at 06:36

## 2018-11-08 RX ADMIN — SIMETHICONE 80 MILLIGRAM(S): 80 TABLET, CHEWABLE ORAL at 21:06

## 2018-11-08 RX ADMIN — SIMETHICONE 80 MILLIGRAM(S): 80 TABLET, CHEWABLE ORAL at 13:09

## 2018-11-08 RX ADMIN — Medication 50 MILLIGRAM(S): at 06:36

## 2018-11-08 RX ADMIN — MORPHINE SULFATE 2 MILLIGRAM(S): 50 CAPSULE, EXTENDED RELEASE ORAL at 23:31

## 2018-11-08 RX ADMIN — LIDOCAINE 1 PATCH: 4 CREAM TOPICAL at 11:01

## 2018-11-08 RX ADMIN — MORPHINE SULFATE 2 MILLIGRAM(S): 50 CAPSULE, EXTENDED RELEASE ORAL at 21:33

## 2018-11-08 RX ADMIN — Medication 50 MILLIGRAM(S): at 17:30

## 2018-11-08 RX ADMIN — MORPHINE SULFATE 2 MILLIGRAM(S): 50 CAPSULE, EXTENDED RELEASE ORAL at 17:47

## 2018-11-08 RX ADMIN — LATANOPROST 1 DROP(S): 0.05 SOLUTION/ DROPS OPHTHALMIC; TOPICAL at 21:06

## 2018-11-08 RX ADMIN — PANTOPRAZOLE SODIUM 40 MILLIGRAM(S): 20 TABLET, DELAYED RELEASE ORAL at 06:36

## 2018-11-08 RX ADMIN — HEPARIN SODIUM 5000 UNIT(S): 5000 INJECTION INTRAVENOUS; SUBCUTANEOUS at 21:06

## 2018-11-08 RX ADMIN — Medication 650 MILLIGRAM(S): at 00:44

## 2018-11-08 RX ADMIN — Medication 50 MILLILITER(S): at 10:43

## 2018-11-08 RX ADMIN — INSULIN HUMAN 10 UNIT(S): 100 INJECTION, SOLUTION SUBCUTANEOUS at 10:51

## 2018-11-08 RX ADMIN — OXYCODONE AND ACETAMINOPHEN 1 TABLET(S): 5; 325 TABLET ORAL at 23:08

## 2018-11-08 RX ADMIN — HEPARIN SODIUM 5000 UNIT(S): 5000 INJECTION INTRAVENOUS; SUBCUTANEOUS at 06:36

## 2018-11-08 RX ADMIN — SIMETHICONE 80 MILLIGRAM(S): 80 TABLET, CHEWABLE ORAL at 06:36

## 2018-11-08 RX ADMIN — HEPARIN SODIUM 5000 UNIT(S): 5000 INJECTION INTRAVENOUS; SUBCUTANEOUS at 13:09

## 2018-11-08 RX ADMIN — ATORVASTATIN CALCIUM 40 MILLIGRAM(S): 80 TABLET, FILM COATED ORAL at 21:06

## 2018-11-08 RX ADMIN — CALCITRIOL 0.25 MICROGRAM(S): 0.5 CAPSULE ORAL at 11:01

## 2018-11-08 RX ADMIN — MORPHINE SULFATE 2 MILLIGRAM(S): 50 CAPSULE, EXTENDED RELEASE ORAL at 11:03

## 2018-11-08 NOTE — PROGRESS NOTE ADULT - ASSESSMENT
Patient will need a bladder scan followed by a carlos catheter if needed  Will need to go to rehab due to unsteadiness  pelvic ultrasound

## 2018-11-08 NOTE — PROGRESS NOTE ADULT - SUBJECTIVE AND OBJECTIVE BOX
LENGTH OF HOSPITAL STAY: 4d    CHIEF COMPLAINT:   Patient is a 83y old  Female who presents with a chief complaint of chest pain and SOB (2018 14:43)      HISTORY OF PRESENTING ILLNESS:    HPI:  83 yr old female with pmhx of HTN, CKD 4, recent DVT/PE (thought provoked by travel, completed 6mo a/c and no longer on Eliquis) CHF s/p PPM presented to ER for cough and chest pain. As per patient, she developed non productive cough for the past 2 days with no fevers. Today morning 3am she woke up with pain in the L flank and b/l knees that is 6-8/10 intermittent and she also had one episode of chest tightness centrally located, non radiating which resolved on its own. She denies any fever, chills, headache, urinary or bowel issues. No recent travel or sick contacts. (2018 19:50)    Patient this am still c/o of lower pelvic pain.  States she is able to urinate. Has moved her bowels.  No chest pain or dyspnea    PAST MEDICAL & SURGICAL HISTORY  PAST MEDICAL & SURGICAL HISTORY:  Hypertension  Pacemaker  Chronic kidney disease  Artificial pacemaker    SOCIAL HISTORY:    ALLERGIES:  Keflex (Unknown)    MEDICATIONS:  STANDING MEDICATIONS  atorvastatin 40 milliGRAM(s) Oral at bedtime  calcitriol   Capsule 0.25 MICROGram(s) Oral daily  folic acid 1 milliGRAM(s) Oral daily  furosemide    Tablet 40 milliGRAM(s) Oral daily  heparin  Injectable 5000 Unit(s) SubCutaneous every 8 hours  influenza   Vaccine 0.5 milliLiter(s) IntraMuscular once  latanoprost 0.005% Ophthalmic Solution 1 Drop(s) Both EYES at bedtime  lidocaine   Patch 1 Patch Transdermal daily  metoprolol tartrate 50 milliGRAM(s) Oral two times a day  pantoprazole    Tablet 40 milliGRAM(s) Oral before breakfast  simethicone 80 milliGRAM(s) Chew three times a day    PRN MEDICATIONS  acetaminophen   Tablet .. 650 milliGRAM(s) Oral every 6 hours PRN  morphine  - Injectable 2 milliGRAM(s) IV Push every 4 hours PRN  oxyCODONE    5 mG/acetaminophen 325 mG 1 Tablet(s) Oral every 6 hours PRN    VITALS:   T(F): 97  HR: 63  BP: 118/47  RR: 18  SpO2: --    LABS:                        9.8    10.13 )-----------( 231      ( 2018 08:04 )             32.0     11    135  |  96<L>  |  53<H>  ----------------------------<  100<H>  5.7<H>   |  29  |  2.3<H>    Ca    9.7      2018 08:04        Urinalysis Basic - ( 2018 22:00 )    Color: Yellow / Appearance: Clear / S.010 / pH: x  Gluc: x / Ketone: Negative  / Bili: Negative / Urobili: 0.2   Blood: x / Protein: Negative / Nitrite: Negative   Leuk Esterase: Negative / RBC: x / WBC x   Sq Epi: x / Non Sq Epi: x / Bacteria: x                RADIOLOGY:    PHYSICAL EXAM:  GEN: No acute distress  HEENT:   LUNGS: Clear to auscultation bilaterally   HEART: S1/S2 present. RRR.   ABD: Soft, with distended bladder on palpation  NEURO: AAOX3

## 2018-11-08 NOTE — PROGRESS NOTE ADULT - ASSESSMENT
83F with PMHx of HTN, CKD 4, recent DVT/PE (thought provoked by travel, completed 6mo a/c and no longer on Eliquis) CHF s/p PPM presented to ER for cough and chest pain.    #Left flank pain likely musculoskeletal  - Ct abd and UA neg both x 2   - Pain management, prn morphine/perocet  - lidocaine patch   - Patient with some suprapubic pain. Bladder scan <150cc. Will check Pelvic US - pending    #Hyperkalemia   - S/p insulin/D50  - recheck bmp in AM.   - No ACE/ARB   - F/u nephrology as o/p     #Chest pain atypical likely musculoskeletal  - ICD interrogated - no events  - ACS ruled out  - Continue Metroprolol, Lasix and Atorvastatin.     #Cough unlikely PNA  - Evaluated by pulmonary  - can d/c Antibiotics   - incentive spirometry q2 hours    #CHF s/p PPM   - BNP 7000  - c/w current meds   - GIIDD on transthoracic    #HTN  - c/w  lopressor, Lasix    #CKD stage 4  - stable (baseline ~ 2.3 - 2.8)  - c/w calcitriol  - monitor bmp    Diet: DASH  DVT ppx / GI ppx: Lovenox 40mg daily / Pantoprazole   Activity: ambulate as tolerated, SNF vs [facility]   Disposition: from home  Full code  Anticipate for d/c tomorrow

## 2018-11-08 NOTE — PROGRESS NOTE ADULT - SUBJECTIVE AND OBJECTIVE BOX
SUBJECTIVE:    Patient is a 83y old Female who presents with a chief complaint of chest pain and SOB (2018 09:15)    Currently admitted to medicine with the primary diagnosis of atypical chest pain     Today is hospital day 4d. No overnight events.     PAST MEDICAL & SURGICAL HISTORY  Hypertension  Pacemaker  Chronic kidney disease  Artificial pacemaker    SOCIAL HISTORY:  Negative for smoking/alcohol/drug use.     ALLERGIES:  Keflex (Unknown)    MEDICATIONS:  STANDING MEDICATIONS  atorvastatin 40 milliGRAM(s) Oral at bedtime  calcitriol   Capsule 0.25 MICROGram(s) Oral daily  folic acid 1 milliGRAM(s) Oral daily  furosemide    Tablet 40 milliGRAM(s) Oral daily  heparin  Injectable 5000 Unit(s) SubCutaneous every 8 hours  influenza   Vaccine 0.5 milliLiter(s) IntraMuscular once  insulin regular  human recombinant. 10 Unit(s) IV Push once  latanoprost 0.005% Ophthalmic Solution 1 Drop(s) Both EYES at bedtime  lidocaine   Patch 1 Patch Transdermal daily  metoprolol tartrate 50 milliGRAM(s) Oral two times a day  pantoprazole    Tablet 40 milliGRAM(s) Oral before breakfast  simethicone 80 milliGRAM(s) Chew three times a day    PRN MEDICATIONS  acetaminophen   Tablet .. 650 milliGRAM(s) Oral every 6 hours PRN  morphine  - Injectable 2 milliGRAM(s) IV Push every 4 hours PRN  oxyCODONE    5 mG/acetaminophen 325 mG 1 Tablet(s) Oral every 6 hours PRN    VITALS:   T(F): 98.4  HR: 73  BP: 155/72  RR: 18  SpO2: --    LABS:                        9.8    10.13 )-----------( 231      ( 2018 08:04 )             32.0     11-08    135  |  96<L>  |  53<H>  ----------------------------<  100<H>  5.7<H>   |  29  |  2.3<H>    Ca    9.7      2018 08:04        Urinalysis Basic - ( 2018 22:00 )    Color: Yellow / Appearance: Clear / S.010 / pH: x  Gluc: x / Ketone: Negative  / Bili: Negative / Urobili: 0.2   Blood: x / Protein: Negative / Nitrite: Negative   Leuk Esterase: Negative / RBC: x / WBC x   Sq Epi: x / Non Sq Epi: x / Bacteria: x      PHYSICAL EXAM:  GEN: No acute distress  LUNGS: Clear to auscultation bilaterally   HEART: S1/S2 present. RRR.   ABD: Soft, non-tender, non-distended. Bowel sounds present  EXT: NC/NC/NE/2+PP/JUÁREZ  NEURO: AAOX3

## 2018-11-09 DIAGNOSIS — Z95.0 PRESENCE OF CARDIAC PACEMAKER: ICD-10-CM

## 2018-11-09 LAB
ANION GAP SERPL CALC-SCNC: 14 MMOL/L — SIGNIFICANT CHANGE UP (ref 7–14)
BASOPHILS # BLD AUTO: 0.04 K/UL — SIGNIFICANT CHANGE UP (ref 0–0.2)
BASOPHILS NFR BLD AUTO: 0.3 % — SIGNIFICANT CHANGE UP (ref 0–1)
BUN SERPL-MCNC: 58 MG/DL — HIGH (ref 10–20)
CALCIUM SERPL-MCNC: 9.7 MG/DL — SIGNIFICANT CHANGE UP (ref 8.5–10.1)
CHLORIDE SERPL-SCNC: 91 MMOL/L — LOW (ref 98–110)
CO2 SERPL-SCNC: 28 MMOL/L — SIGNIFICANT CHANGE UP (ref 17–32)
CREAT SERPL-MCNC: 2.2 MG/DL — HIGH (ref 0.7–1.5)
EOSINOPHIL # BLD AUTO: 0.5 K/UL — SIGNIFICANT CHANGE UP (ref 0–0.7)
EOSINOPHIL NFR BLD AUTO: 4.1 % — SIGNIFICANT CHANGE UP (ref 0–8)
GLUCOSE SERPL-MCNC: 160 MG/DL — HIGH (ref 70–99)
HCT VFR BLD CALC: 32 % — LOW (ref 37–47)
HGB BLD-MCNC: 10.2 G/DL — LOW (ref 12–16)
IMM GRANULOCYTES NFR BLD AUTO: 0.5 % — HIGH (ref 0.1–0.3)
LYMPHOCYTES # BLD AUTO: 17.8 % — LOW (ref 20.5–51.1)
LYMPHOCYTES # BLD AUTO: 2.18 K/UL — SIGNIFICANT CHANGE UP (ref 1.2–3.4)
MCHC RBC-ENTMCNC: 29 PG — SIGNIFICANT CHANGE UP (ref 27–31)
MCHC RBC-ENTMCNC: 31.9 G/DL — LOW (ref 32–37)
MCV RBC AUTO: 90.9 FL — SIGNIFICANT CHANGE UP (ref 81–99)
MONOCYTES # BLD AUTO: 1.43 K/UL — HIGH (ref 0.1–0.6)
MONOCYTES NFR BLD AUTO: 11.7 % — HIGH (ref 1.7–9.3)
NEUTROPHILS # BLD AUTO: 8.05 K/UL — HIGH (ref 1.4–6.5)
NEUTROPHILS NFR BLD AUTO: 65.6 % — SIGNIFICANT CHANGE UP (ref 42.2–75.2)
NRBC # BLD: 0 /100 WBCS — SIGNIFICANT CHANGE UP (ref 0–0)
PLATELET # BLD AUTO: 213 K/UL — SIGNIFICANT CHANGE UP (ref 130–400)
POTASSIUM SERPL-MCNC: 5.1 MMOL/L — HIGH (ref 3.5–5)
POTASSIUM SERPL-SCNC: 5.1 MMOL/L — HIGH (ref 3.5–5)
RBC # BLD: 3.52 M/UL — LOW (ref 4.2–5.4)
RBC # FLD: 13.6 % — SIGNIFICANT CHANGE UP (ref 11.5–14.5)
SODIUM SERPL-SCNC: 133 MMOL/L — LOW (ref 135–146)
WBC # BLD: 12.26 K/UL — HIGH (ref 4.8–10.8)
WBC # FLD AUTO: 12.26 K/UL — HIGH (ref 4.8–10.8)

## 2018-11-09 RX ORDER — LIDOCAINE 4 G/100G
1 CREAM TOPICAL
Qty: 0 | Refills: 0 | COMMUNITY
Start: 2018-11-09

## 2018-11-09 RX ORDER — CIPROFLOXACIN LACTATE 400MG/40ML
500 VIAL (ML) INTRAVENOUS EVERY 12 HOURS
Qty: 0 | Refills: 0 | Status: DISCONTINUED | OUTPATIENT
Start: 2018-11-09 | End: 2018-11-10

## 2018-11-09 RX ORDER — CEFTRIAXONE 500 MG/1
1 INJECTION, POWDER, FOR SOLUTION INTRAMUSCULAR; INTRAVENOUS EVERY 24 HOURS
Qty: 0 | Refills: 0 | Status: DISCONTINUED | OUTPATIENT
Start: 2018-11-09 | End: 2018-11-09

## 2018-11-09 RX ADMIN — PANTOPRAZOLE SODIUM 40 MILLIGRAM(S): 20 TABLET, DELAYED RELEASE ORAL at 06:11

## 2018-11-09 RX ADMIN — ATORVASTATIN CALCIUM 40 MILLIGRAM(S): 80 TABLET, FILM COATED ORAL at 21:34

## 2018-11-09 RX ADMIN — MORPHINE SULFATE 2 MILLIGRAM(S): 50 CAPSULE, EXTENDED RELEASE ORAL at 04:31

## 2018-11-09 RX ADMIN — SIMETHICONE 80 MILLIGRAM(S): 80 TABLET, CHEWABLE ORAL at 06:11

## 2018-11-09 RX ADMIN — Medication 50 MILLIGRAM(S): at 06:11

## 2018-11-09 RX ADMIN — SIMETHICONE 80 MILLIGRAM(S): 80 TABLET, CHEWABLE ORAL at 21:34

## 2018-11-09 RX ADMIN — LIDOCAINE 1 PATCH: 4 CREAM TOPICAL at 14:02

## 2018-11-09 RX ADMIN — Medication 40 MILLIGRAM(S): at 06:11

## 2018-11-09 RX ADMIN — LIDOCAINE 1 PATCH: 4 CREAM TOPICAL at 19:15

## 2018-11-09 RX ADMIN — Medication 50 MILLIGRAM(S): at 17:41

## 2018-11-09 RX ADMIN — OXYCODONE AND ACETAMINOPHEN 1 TABLET(S): 5; 325 TABLET ORAL at 22:30

## 2018-11-09 RX ADMIN — Medication 650 MILLIGRAM(S): at 22:15

## 2018-11-09 RX ADMIN — Medication 1 MILLIGRAM(S): at 14:03

## 2018-11-09 RX ADMIN — HEPARIN SODIUM 5000 UNIT(S): 5000 INJECTION INTRAVENOUS; SUBCUTANEOUS at 06:12

## 2018-11-09 RX ADMIN — SIMETHICONE 80 MILLIGRAM(S): 80 TABLET, CHEWABLE ORAL at 14:03

## 2018-11-09 RX ADMIN — OXYCODONE AND ACETAMINOPHEN 1 TABLET(S): 5; 325 TABLET ORAL at 23:15

## 2018-11-09 RX ADMIN — MORPHINE SULFATE 2 MILLIGRAM(S): 50 CAPSULE, EXTENDED RELEASE ORAL at 14:01

## 2018-11-09 RX ADMIN — Medication 500 MILLIGRAM(S): at 21:34

## 2018-11-09 RX ADMIN — HEPARIN SODIUM 5000 UNIT(S): 5000 INJECTION INTRAVENOUS; SUBCUTANEOUS at 14:04

## 2018-11-09 RX ADMIN — Medication 650 MILLIGRAM(S): at 21:37

## 2018-11-09 RX ADMIN — LATANOPROST 1 DROP(S): 0.05 SOLUTION/ DROPS OPHTHALMIC; TOPICAL at 21:35

## 2018-11-09 RX ADMIN — CALCITRIOL 0.25 MICROGRAM(S): 0.5 CAPSULE ORAL at 14:03

## 2018-11-09 RX ADMIN — HEPARIN SODIUM 5000 UNIT(S): 5000 INJECTION INTRAVENOUS; SUBCUTANEOUS at 21:35

## 2018-11-09 NOTE — PROGRESS NOTE ADULT - SUBJECTIVE AND OBJECTIVE BOX
LENGTH OF HOSPITAL STAY: 5d    CHIEF COMPLAINT:   Patient is a 83y old  Female who presents with a chief complaint of chest pain and SOB (2018 13:25)      HISTORY OF PRESENTING ILLNESS:    HPI:  83 yr old female with pmhx of HTN, CKD 4, recent DVT/PE (thought provoked by travel, completed 6mo a/c and no longer on Eliquis) CHF s/p PPM presented to ER for cough and chest pain. As per patient, she developed non productive cough for the past 2 days with no fevers. Today morning 3am she woke up with pain in the L flank and b/l knees that is 6-8/10 intermittent and she also had one episode of chest tightness centrally located, non radiating which resolved on its own. She denies any fever, chills, headache, urinary or bowel issues. No recent travel or sick contacts. (2018 19:50)    Patient states that she feels better today with less pelvic pain which  typically improves when urinating.    PAST MEDICAL & SURGICAL HISTORY  PAST MEDICAL & SURGICAL HISTORY:  Hypertension  Pacemaker  Chronic kidney disease  Artificial pacemaker    SOCIAL HISTORY:    ALLERGIES:  Keflex (Unknown)    MEDICATIONS:  STANDING MEDICATIONS  atorvastatin 40 milliGRAM(s) Oral at bedtime  calcitriol   Capsule 0.25 MICROGram(s) Oral daily  folic acid 1 milliGRAM(s) Oral daily  furosemide    Tablet 40 milliGRAM(s) Oral daily  heparin  Injectable 5000 Unit(s) SubCutaneous every 8 hours  influenza   Vaccine 0.5 milliLiter(s) IntraMuscular once  insulin regular  human recombinant. 10 Unit(s) IV Push once  latanoprost 0.005% Ophthalmic Solution 1 Drop(s) Both EYES at bedtime  lidocaine   Patch 1 Patch Transdermal daily  metoprolol tartrate 50 milliGRAM(s) Oral two times a day  pantoprazole    Tablet 40 milliGRAM(s) Oral before breakfast  simethicone 80 milliGRAM(s) Chew three times a day    PRN MEDICATIONS  acetaminophen   Tablet .. 650 milliGRAM(s) Oral every 6 hours PRN  morphine  - Injectable 2 milliGRAM(s) IV Push every 4 hours PRN  oxyCODONE    5 mG/acetaminophen 325 mG 1 Tablet(s) Oral every 6 hours PRN    VITALS:   T(F): 98  HR: 69  BP: 148/67  RR: 18  SpO2: 97%    LABS:                        10.2   12.26 )-----------( 213      ( 2018 07:37 )             32.0     11-08    135  |  96<L>  |  53<H>  ----------------------------<  100<H>  5.7<H>   |  29  |  2.3<H>    Ca    9.7      2018 08:04        Urinalysis Basic - ( 2018 22:00 )    Color: Yellow / Appearance: Clear / S.010 / pH: x  Gluc: x / Ketone: Negative  / Bili: Negative / Urobili: 0.2   Blood: x / Protein: Negative / Nitrite: Negative   Leuk Esterase: Negative / RBC: x / WBC x   Sq Epi: x / Non Sq Epi: x / Bacteria: x                RADIOLOGY:    PHYSICAL EXAM:  GEN: No acute distress  HEENT:   LUNGS: Clear to auscultation bilaterally   HEART: S1/S2 present. RRR.   ABD: Soft, non-tender, non-distended. Bowel sounds present  EXT:  NEURO: AAOX3

## 2018-11-09 NOTE — PROGRESS NOTE ADULT - ASSESSMENT
83F with PMHx of HTN, CKD 4, recent DVT/PE (thought provoked by travel, completed 6mo a/c and no longer on Eliquis) CHF s/p PPM presented to ER for cough and chest pain.    #Left flank pain likely musculoskeletal  - Ct abd and UA neg both x 2, pelvic US wnl   - Pain management, prn morphine/perocet  - lidocaine patch   - Patient with some suprapubic pain. Bladder scan <150cc.   - will need pain management, rehab and outpatient follow up with PMD    #Hyperkalemia - resolved  - S/p insulin/D50  - No ACE/ARB   - F/u nephrology as o/p     #Chest pain atypical likely musculoskeletal  - ICD interrogated - no events  - ACS ruled out  - Continue Metroprolol, Lasix and Atorvastatin.     #Cough unlikely PNA  - Evaluated by pulmonary  - can d/c Antibiotics   - incentive spirometry q2 hours    #CHF s/p PPM   - BNP 7000  - c/w current meds   - GIIDD on transthoracic    #HTN  - c/w  lopressor, Lasix    #CKD stage 4  - stable (baseline ~ 2.3 - 2.8)  - c/w calcitriol  - monitor bmp    Diet: DASH  DVT ppx / GI ppx: Lovenox 40mg daily / Pantoprazole   Activity: ambulate as tolerated   Disposition: from home  Full code  Anticipate for d/c tomorrow - could not discharge today because NH does not take patients insurance.

## 2018-11-09 NOTE — PROGRESS NOTE ADULT - SUBJECTIVE AND OBJECTIVE BOX
SUBJECTIVE:    Patient is a 83y old Female who presents with a chief complaint of chest pain and SOB (2018 08:35)    Currently admitted to medicine with the primary diagnosis of Left flank pain     Today is hospital day 5d. This morning she is resting comfortably in bed and reports no new issues or overnight events.     PAST MEDICAL & SURGICAL HISTORY  Hypertension  Pacemaker  Chronic kidney disease  Artificial pacemaker    SOCIAL HISTORY:  Negative for smoking/alcohol/drug use.     ALLERGIES:  Keflex (Unknown)    MEDICATIONS:  STANDING MEDICATIONS  atorvastatin 40 milliGRAM(s) Oral at bedtime  calcitriol   Capsule 0.25 MICROGram(s) Oral daily  folic acid 1 milliGRAM(s) Oral daily  furosemide    Tablet 40 milliGRAM(s) Oral daily  heparin  Injectable 5000 Unit(s) SubCutaneous every 8 hours  influenza   Vaccine 0.5 milliLiter(s) IntraMuscular once  insulin regular  human recombinant. 10 Unit(s) IV Push once  latanoprost 0.005% Ophthalmic Solution 1 Drop(s) Both EYES at bedtime  lidocaine   Patch 1 Patch Transdermal daily  metoprolol tartrate 50 milliGRAM(s) Oral two times a day  pantoprazole    Tablet 40 milliGRAM(s) Oral before breakfast  simethicone 80 milliGRAM(s) Chew three times a day    PRN MEDICATIONS  acetaminophen   Tablet .. 650 milliGRAM(s) Oral every 6 hours PRN  morphine  - Injectable 2 milliGRAM(s) IV Push every 4 hours PRN  oxyCODONE    5 mG/acetaminophen 325 mG 1 Tablet(s) Oral every 6 hours PRN    VITALS:   T(F): 100.1  HR: 67  BP: 161/47  RR: 18  SpO2: 97%    LABS:                        10.2   12.26 )-----------( 213      ( 2018 07:37 )             32.0         133<L>  |  91<L>  |  58<H>  ----------------------------<  160<H>  5.1<H>   |  28  |  2.2<H>    Ca    9.7      2018 07:37        Urinalysis Basic - ( 2018 22:00 )    Color: Yellow / Appearance: Clear / S.010 / pH: x  Gluc: x / Ketone: Negative  / Bili: Negative / Urobili: 0.2   Blood: x / Protein: Negative / Nitrite: Negative   Leuk Esterase: Negative / RBC: x / WBC x   Sq Epi: x / Non Sq Epi: x / Bacteria: x      RADIOLOGY:    < from: US Pelvis Complete (18 @ 12:34) >    IMPRESSION:    Post hysterectomy and bilateral oophorectomy.    No free pelvic fluid. No abnormal soft tissue mass in the pelvis.      < end of copied text >      PHYSICAL EXAM:  GEN: No acute distress  LUNGS: Clear to auscultation bilaterally   HEART: S1/S2 present. RRR.   ABD: Soft, non-tender, non-distended. Bowel sounds present  EXT: NC/NC/NE/2+PP/JUÁREZ  NEURO: AAOX3

## 2018-11-10 LAB
ANION GAP SERPL CALC-SCNC: 14 MMOL/L — SIGNIFICANT CHANGE UP (ref 7–14)
BUN SERPL-MCNC: 54 MG/DL — HIGH (ref 10–20)
CALCIUM SERPL-MCNC: 8.8 MG/DL — SIGNIFICANT CHANGE UP (ref 8.5–10.1)
CHLORIDE SERPL-SCNC: 89 MMOL/L — LOW (ref 98–110)
CO2 SERPL-SCNC: 29 MMOL/L — SIGNIFICANT CHANGE UP (ref 17–32)
CREAT SERPL-MCNC: 2.2 MG/DL — HIGH (ref 0.7–1.5)
GLUCOSE SERPL-MCNC: 108 MG/DL — HIGH (ref 70–99)
LACTATE SERPL-SCNC: 2.2 MMOL/L — SIGNIFICANT CHANGE UP (ref 0.5–2.2)
POTASSIUM SERPL-MCNC: 5.1 MMOL/L — HIGH (ref 3.5–5)
POTASSIUM SERPL-SCNC: 5.1 MMOL/L — HIGH (ref 3.5–5)
SODIUM SERPL-SCNC: 132 MMOL/L — LOW (ref 135–146)

## 2018-11-10 RX ORDER — CIPROFLOXACIN LACTATE 400MG/40ML
VIAL (ML) INTRAVENOUS
Qty: 0 | Refills: 0 | Status: DISCONTINUED | OUTPATIENT
Start: 2018-11-10 | End: 2018-11-20

## 2018-11-10 RX ORDER — METRONIDAZOLE 500 MG
TABLET ORAL
Qty: 0 | Refills: 0 | Status: DISCONTINUED | OUTPATIENT
Start: 2018-11-10 | End: 2018-11-20

## 2018-11-10 RX ORDER — METRONIDAZOLE 500 MG
500 TABLET ORAL ONCE
Qty: 0 | Refills: 0 | Status: COMPLETED | OUTPATIENT
Start: 2018-11-10 | End: 2018-11-10

## 2018-11-10 RX ORDER — CIPROFLOXACIN LACTATE 400MG/40ML
VIAL (ML) INTRAVENOUS
Qty: 0 | Refills: 0 | Status: DISCONTINUED | OUTPATIENT
Start: 2018-11-10 | End: 2018-11-10

## 2018-11-10 RX ORDER — ONDANSETRON 8 MG/1
4 TABLET, FILM COATED ORAL ONCE
Qty: 0 | Refills: 0 | Status: COMPLETED | OUTPATIENT
Start: 2018-11-10 | End: 2018-11-10

## 2018-11-10 RX ORDER — CIPROFLOXACIN LACTATE 400MG/40ML
400 VIAL (ML) INTRAVENOUS DAILY
Qty: 0 | Refills: 0 | Status: DISCONTINUED | OUTPATIENT
Start: 2018-11-11 | End: 2018-11-20

## 2018-11-10 RX ORDER — METRONIDAZOLE 500 MG
500 TABLET ORAL EVERY 8 HOURS
Qty: 0 | Refills: 0 | Status: DISCONTINUED | OUTPATIENT
Start: 2018-11-10 | End: 2018-11-20

## 2018-11-10 RX ORDER — CIPROFLOXACIN LACTATE 400MG/40ML
400 VIAL (ML) INTRAVENOUS ONCE
Qty: 0 | Refills: 0 | Status: COMPLETED | OUTPATIENT
Start: 2018-11-10 | End: 2018-11-10

## 2018-11-10 RX ADMIN — Medication 50 MILLIGRAM(S): at 17:08

## 2018-11-10 RX ADMIN — ATORVASTATIN CALCIUM 40 MILLIGRAM(S): 80 TABLET, FILM COATED ORAL at 21:33

## 2018-11-10 RX ADMIN — SIMETHICONE 80 MILLIGRAM(S): 80 TABLET, CHEWABLE ORAL at 14:04

## 2018-11-10 RX ADMIN — MORPHINE SULFATE 2 MILLIGRAM(S): 50 CAPSULE, EXTENDED RELEASE ORAL at 02:30

## 2018-11-10 RX ADMIN — Medication 500 MILLIGRAM(S): at 06:52

## 2018-11-10 RX ADMIN — Medication 100 MILLIGRAM(S): at 14:03

## 2018-11-10 RX ADMIN — CALCITRIOL 0.25 MICROGRAM(S): 0.5 CAPSULE ORAL at 11:59

## 2018-11-10 RX ADMIN — LIDOCAINE 1 PATCH: 4 CREAM TOPICAL at 02:10

## 2018-11-10 RX ADMIN — Medication 40 MILLIGRAM(S): at 06:51

## 2018-11-10 RX ADMIN — HEPARIN SODIUM 5000 UNIT(S): 5000 INJECTION INTRAVENOUS; SUBCUTANEOUS at 21:34

## 2018-11-10 RX ADMIN — SIMETHICONE 80 MILLIGRAM(S): 80 TABLET, CHEWABLE ORAL at 21:34

## 2018-11-10 RX ADMIN — OXYCODONE AND ACETAMINOPHEN 1 TABLET(S): 5; 325 TABLET ORAL at 05:46

## 2018-11-10 RX ADMIN — ONDANSETRON 4 MILLIGRAM(S): 8 TABLET, FILM COATED ORAL at 01:53

## 2018-11-10 RX ADMIN — LIDOCAINE 1 PATCH: 4 CREAM TOPICAL at 11:59

## 2018-11-10 RX ADMIN — SIMETHICONE 80 MILLIGRAM(S): 80 TABLET, CHEWABLE ORAL at 06:52

## 2018-11-10 RX ADMIN — Medication 200 MILLIGRAM(S): at 17:37

## 2018-11-10 RX ADMIN — LIDOCAINE 1 PATCH: 4 CREAM TOPICAL at 23:05

## 2018-11-10 RX ADMIN — Medication 100 MILLIGRAM(S): at 21:33

## 2018-11-10 RX ADMIN — Medication 50 MILLIGRAM(S): at 06:51

## 2018-11-10 RX ADMIN — Medication 1 MILLIGRAM(S): at 11:59

## 2018-11-10 RX ADMIN — OXYCODONE AND ACETAMINOPHEN 1 TABLET(S): 5; 325 TABLET ORAL at 06:48

## 2018-11-10 RX ADMIN — MORPHINE SULFATE 2 MILLIGRAM(S): 50 CAPSULE, EXTENDED RELEASE ORAL at 01:47

## 2018-11-10 RX ADMIN — HEPARIN SODIUM 5000 UNIT(S): 5000 INJECTION INTRAVENOUS; SUBCUTANEOUS at 14:05

## 2018-11-10 RX ADMIN — LATANOPROST 1 DROP(S): 0.05 SOLUTION/ DROPS OPHTHALMIC; TOPICAL at 21:34

## 2018-11-10 RX ADMIN — MORPHINE SULFATE 2 MILLIGRAM(S): 50 CAPSULE, EXTENDED RELEASE ORAL at 07:38

## 2018-11-10 RX ADMIN — LIDOCAINE 1 PATCH: 4 CREAM TOPICAL at 17:04

## 2018-11-10 RX ADMIN — HEPARIN SODIUM 5000 UNIT(S): 5000 INJECTION INTRAVENOUS; SUBCUTANEOUS at 06:52

## 2018-11-10 RX ADMIN — PANTOPRAZOLE SODIUM 40 MILLIGRAM(S): 20 TABLET, DELAYED RELEASE ORAL at 06:51

## 2018-11-10 RX ADMIN — MORPHINE SULFATE 2 MILLIGRAM(S): 50 CAPSULE, EXTENDED RELEASE ORAL at 07:04

## 2018-11-10 NOTE — PROGRESS NOTE ADULT - SUBJECTIVE AND OBJECTIVE BOX
Patient is a 83y old  Female who presents with a chief complaint of chest pain and SOB (10 Nov 2018 12:05)      Overnight events:   No acute event overnight. Patient complaints of LLQ pain this AM. Will get surgery on board as per Dr. Celeste.         PAST MEDICAL & SURGICAL HISTORY:  Hypertension  Pacemaker  Chronic kidney disease  Artificial pacemaker      Allergies    Keflex (Unknown)    Intolerances        MEDICATIONS  (STANDING):  atorvastatin 40 milliGRAM(s) Oral at bedtime  calcitriol   Capsule 0.25 MICROGram(s) Oral daily  ciprofloxacin     Tablet 500 milliGRAM(s) Oral every 12 hours  ciprofloxacin   IVPB      folic acid 1 milliGRAM(s) Oral daily  furosemide    Tablet 40 milliGRAM(s) Oral daily  heparin  Injectable 5000 Unit(s) SubCutaneous every 8 hours  influenza   Vaccine 0.5 milliLiter(s) IntraMuscular once  insulin regular  human recombinant. 10 Unit(s) IV Push once  latanoprost 0.005% Ophthalmic Solution 1 Drop(s) Both EYES at bedtime  lidocaine   Patch 1 Patch Transdermal daily  metoprolol tartrate 50 milliGRAM(s) Oral two times a day  metroNIDAZOLE  IVPB 500 milliGRAM(s) IV Intermittent every 8 hours  metroNIDAZOLE  IVPB      pantoprazole    Tablet 40 milliGRAM(s) Oral before breakfast  simethicone 80 milliGRAM(s) Chew three times a day    MEDICATIONS  (PRN):  acetaminophen   Tablet .. 650 milliGRAM(s) Oral every 6 hours PRN Temp greater or equal to 38C (100.4F), Mild Pain (1 - 3)  morphine  - Injectable 2 milliGRAM(s) IV Push every 4 hours PRN Severe Pain (7 - 10)  oxyCODONE    5 mG/acetaminophen 325 mG 1 Tablet(s) Oral every 6 hours PRN Moderate Pain (4 - 6)        Vital Signs Last 24 Hrs  T(C): 37.4 (10 Nov 2018 13:58), Max: 37.9 (09 Nov 2018 20:22)  T(F): 99.4 (10 Nov 2018 13:58), Max: 100.3 (09 Nov 2018 20:22)  HR: 64 (10 Nov 2018 13:58) (60 - 70)  BP: 154/75 (10 Nov 2018 13:58) (142/64 - 181/80)  BP(mean): --  RR: 20 (10 Nov 2018 13:58) (17 - 20)  SpO2: 97% (10 Nov 2018 08:16) (97% - 97%)  CAPILLARY BLOOD GLUCOSE        I&O's Summary    09 Nov 2018 07:01  -  10 Nov 2018 07:00  --------------------------------------------------------  IN: 0 mL / OUT: 200 mL / NET: -200 mL    10 Nov 2018 07:01  -  10 Nov 2018 14:26  --------------------------------------------------------  IN: 100 mL / OUT: 0 mL / NET: 100 mL        Physical Exam:    -     General : Not in acute distress.    -      HEENT: AMELA    -      Cardiac: S1 & S2. No murmur/gallop/rubs.     -      Pulm: clear to auscultate b/l lung field.     -      GI: positive BS. No rigidity/rebound. LLQ tenderness.     -      Musculoskeletal: no pitting edema.     -      Neuro: A & O3.       Labs:                        10.2   12.26 )-----------( 213      ( 09 Nov 2018 07:37 )             32.0                           11-10    132<L>  |  89<L>  |  54<H>  ----------------------------<  108<H>  5.1<H>   |  29  |  2.2<H>    Ca    8.8      10 Nov 2018 08:25                                                                Imaging:    ECG:

## 2018-11-10 NOTE — CHART NOTE - NSCHARTNOTEFT_GEN_A_CORE
Senior Note  I have personally examined and evaluated the patient  pt admitted for sob now complain of llq pain needs po contrast ct   I agree with the above plan and note  Surgical Attending aware and agrees with plan

## 2018-11-10 NOTE — PROGRESS NOTE ADULT - ASSESSMENT
83 F with PMHx of HTN, CKD 4, recent DVT/PE (thought provoked by travel, completed 6mo a/c and no longer on Eliquis) CHF s/p PPM presented to ER for cough and chest pain.    #Left flank and LLQ pain  - Ct abd and UA neg both x 2, pelvic US wnl   - Pain management, prn morphine/perocet  - lidocaine patch   - start on Cipro and flagyl for possible colitis as per PMD.   - Will repeat CT abdo/pelic NC and consult surgery (Dr. Tate) as per PMD.   - Patient with some suprapubic pain. Bladder scan <150cc.   - will need pain management, rehab and outpatient follow up with PMD    #Hyperkalemia - resolved  - S/p insulin/D50  - No ACE/ARB   - F/u nephrology as o/p     #Chest pain atypical likely musculoskeletal  - ICD interrogated - no events  - ACS ruled out  - Continue Metroprolol, Lasix and Atorvastatin.     #Cough unlikely PNA  - Evaluated by pulmonary  - can d/c Antibiotics   - incentive spirometry q2 hours    #CHF s/p PPM   - BNP 7000  - c/w current meds   - GIIDD on transthoracic    #HTN  - c/w  lopressor, Lasix    #CKD stage 4  - stable (baseline ~ 2.3 - 2.8)  - c/w calcitriol  - monitor bmp    Diet: DASH  DVT ppx / GI ppx: Lovenox 40mg daily / Pantoprazole   Activity: ambulate as tolerated   Disposition: from home  Full code  Anticipate for d/c tomorrow - could not discharge today because NH does not take patients insurance.

## 2018-11-10 NOTE — PROGRESS NOTE ADULT - ASSESSMENT
Patient with persistent llq/pelvic pain with Wbc increased to 12K.  On oral Cipro.  Will change to IV Cipro and Flagyl.  Will repeat a non-contrast CT of Abd/Pelvic.  Check labs including lactic acid level.  Will get Surgical eval with Dr. Tate.. Urine sample pending.

## 2018-11-10 NOTE — CONSULT NOTE ADULT - ASSESSMENT
82y/o F admitted 6 days ago complaining of CP, SOB, and LLQ pain. Pt states pain has been localized to LLQ for several days without resolve. Tenderness to palpation of LLQ and RLQ.    PLAN:  - recommend CT w/ contrast (PO contrast if IV contraindic due to renal function) 82y/o F admitted 6 days ago complaining of CP, SOB, and LLQ pain. Pt states pain has been localized to LLQ for several days without resolve. Tenderness to palpation of LLQ and RLQ. WBC increased to 12.    PLAN:  - recommend CT w/ contrast (PO contrast if IV contraindic due to renal function)

## 2018-11-10 NOTE — PROGRESS NOTE ADULT - SUBJECTIVE AND OBJECTIVE BOX
LENGTH OF HOSPITAL STAY: 6d    CHIEF COMPLAINT:   Patient is a 83y old  Female who presents with a chief complaint of chest pain and SOB (09 Nov 2018 15:49)      HISTORY OF PRESENTING ILLNESS:    HPI:  83 yr old female with pmhx of HTN, CKD 4, recent DVT/PE (thought provoked by travel, completed 6mo a/c and no longer on Eliquis) CHF s/p PPM presented to ER for cough and chest pain. As per patient, she developed non productive cough for the past 2 days with no fevers. Today morning 3am she woke up with pain in the L flank and b/l knees that is 6-8/10 intermittent and she also had one episode of chest tightness centrally located, non radiating which resolved on its own. She denies any fever, chills, headache, urinary or bowel issues. No recent travel or sick contacts. (04 Nov 2018 19:50)    Patient still with persistant LLQ/PELVIC PAIN took oral Cipro without improvement.    PAST MEDICAL & SURGICAL HISTORY  PAST MEDICAL & SURGICAL HISTORY:  Hypertension  Pacemaker  Chronic kidney disease  Artificial pacemaker    SOCIAL HISTORY:    ALLERGIES:  Keflex (Unknown)    MEDICATIONS:  STANDING MEDICATIONS  atorvastatin 40 milliGRAM(s) Oral at bedtime  calcitriol   Capsule 0.25 MICROGram(s) Oral daily  ciprofloxacin     Tablet 500 milliGRAM(s) Oral every 12 hours  folic acid 1 milliGRAM(s) Oral daily  furosemide    Tablet 40 milliGRAM(s) Oral daily  heparin  Injectable 5000 Unit(s) SubCutaneous every 8 hours  influenza   Vaccine 0.5 milliLiter(s) IntraMuscular once  insulin regular  human recombinant. 10 Unit(s) IV Push once  latanoprost 0.005% Ophthalmic Solution 1 Drop(s) Both EYES at bedtime  lidocaine   Patch 1 Patch Transdermal daily  metoprolol tartrate 50 milliGRAM(s) Oral two times a day  pantoprazole    Tablet 40 milliGRAM(s) Oral before breakfast  simethicone 80 milliGRAM(s) Chew three times a day    PRN MEDICATIONS  acetaminophen   Tablet .. 650 milliGRAM(s) Oral every 6 hours PRN  morphine  - Injectable 2 milliGRAM(s) IV Push every 4 hours PRN  oxyCODONE    5 mG/acetaminophen 325 mG 1 Tablet(s) Oral every 6 hours PRN    VITALS:   T(F): 97  HR: 60  BP: 142/66  RR: 18  SpO2: 97%    LABS:                        10.2   12.26 )-----------( 213      ( 09 Nov 2018 07:37 )             32.0     11-10    132<L>  |  89<L>  |  54<H>  ----------------------------<  108<H>  5.1<H>   |  29  |  2.2<H>    Ca    8.8      10 Nov 2018 08:25                    RADIOLOGY:    PHYSICAL EXAM:  GEN: No acute distress  HEENT:   LUNGS: Clear to auscultation bilaterally   HEART: S1/S2 present. RRR.   ABD: Soft, with LLQ pain

## 2018-11-10 NOTE — CONSULT NOTE ADULT - SUBJECTIVE AND OBJECTIVE BOX
RACHEL SUMMERS [id number]  83y Female    HPI:  83 yr old female with pmhx of HTN, CKD 4, recent DVT/PE (thought provoked by travel, completed 6mo a/c and no longer on Eliquis), CHF s/p PPM presented to ER for cough and chest pain. As per patient, she developed non productive cough for the past 2 days with no fevers. Today morning 3am she woke up with pain in the L flank and b/l knees that is 6-8/10 intermittent and she also had one episode of chest tightness centrally located, non radiating which resolved on its own. She denies any fever, chills, headache, urinary or bowel issues. No recent travel or sick contacts. (04 Nov 2018 19:50)    Surgical team consulted for LLQ pain. Pt states she's had the pain for several days, localized to LLQ, without relief. Previous non-contrast CTAP showed no abnormalities. Pt denies previous abdominal surgeries.      PAST MEDICAL & SURGICAL HISTORY:  Hypertension  Pacemaker  Chronic kidney disease  Artificial pacemaker        MEDICATIONS  (STANDING):  atorvastatin 40 milliGRAM(s) Oral at bedtime  calcitriol   Capsule 0.25 MICROGram(s) Oral daily  ciprofloxacin   IVPB      folic acid 1 milliGRAM(s) Oral daily  furosemide    Tablet 40 milliGRAM(s) Oral daily  heparin  Injectable 5000 Unit(s) SubCutaneous every 8 hours  influenza   Vaccine 0.5 milliLiter(s) IntraMuscular once  insulin regular  human recombinant. 10 Unit(s) IV Push once  latanoprost 0.005% Ophthalmic Solution 1 Drop(s) Both EYES at bedtime  lidocaine   Patch 1 Patch Transdermal daily  metoprolol tartrate 50 milliGRAM(s) Oral two times a day  metroNIDAZOLE  IVPB 500 milliGRAM(s) IV Intermittent every 8 hours  metroNIDAZOLE  IVPB      pantoprazole    Tablet 40 milliGRAM(s) Oral before breakfast  simethicone 80 milliGRAM(s) Chew three times a day    MEDICATIONS  (PRN):  acetaminophen   Tablet .. 650 milliGRAM(s) Oral every 6 hours PRN Temp greater or equal to 38C (100.4F), Mild Pain (1 - 3)  morphine  - Injectable 2 milliGRAM(s) IV Push every 4 hours PRN Severe Pain (7 - 10)  oxyCODONE    5 mG/acetaminophen 325 mG 1 Tablet(s) Oral every 6 hours PRN Moderate Pain (4 - 6)      Allergies    Keflex (Unknown)    Intolerances        REVIEW OF SYSTEMS    [ ] A ten-point review of systems was otherwise negative except as noted.        Vital Signs Last 24 Hrs  T(C): 37.4 (10 Nov 2018 13:58), Max: 37.9 (09 Nov 2018 20:22)  T(F): 99.4 (10 Nov 2018 13:58), Max: 100.3 (09 Nov 2018 20:22)  HR: 68 (10 Nov 2018 17:21) (60 - 70)  BP: 144/66 (10 Nov 2018 17:21) (142/64 - 181/80)  BP(mean): --  RR: 20 (10 Nov 2018 13:58) (17 - 20)  SpO2: 97% (10 Nov 2018 08:16) (97% - 97%)    PHYSICAL EXAM:  GENERAL: NAD. laying in bed comfortably  CHEST/LUNG: Clear to auscultation bilaterally, non-labored, no rales/wheeze  HEART: Regular rate and rhythm  ABDOMEN: LLQ & RLQ tenderness, nondistended, +BS      LABS:  Labs:  CAPILLARY BLOOD GLUCOSE                              10.2   12.26 )-----------( 213      ( 09 Nov 2018 07:37 )             32.0         11-10    132<L>  |  89<L>  |  54<H>  ----------------------------<  108<H>  5.1<H>   |  29  |  2.2<H>      Calcium, Total Serum: 8.8 mg/dL (11-10-18 @ 08:25)      LFTs:     Lactate, Blood: 2.2 mmol/L (11-10-18 @ 17:01)        RADIOLOGY & ADDITIONAL STUDIES:    pending: CTAP w/ contrast      < from: CT Abdomen and Pelvis No Cont (11.04.18 @ 15:49) >  EXAM:  CT ABDOMEN AND PELVIS IMPRESSION:        No acute abnormality the abdomen and pelvis. No nephrolithiasis.    < end of copied text >

## 2018-11-11 DIAGNOSIS — K57.32 DIVERTICULITIS OF LARGE INTESTINE WITHOUT PERFORATION OR ABSCESS WITHOUT BLEEDING: ICD-10-CM

## 2018-11-11 LAB
ANION GAP SERPL CALC-SCNC: 17 MMOL/L — HIGH (ref 7–14)
ANISOCYTOSIS BLD QL: SLIGHT — SIGNIFICANT CHANGE UP
BASOPHILS # BLD AUTO: 0.15 K/UL — SIGNIFICANT CHANGE UP (ref 0–0.2)
BASOPHILS NFR BLD AUTO: 0.9 % — SIGNIFICANT CHANGE UP (ref 0–1)
BUN SERPL-MCNC: 33 MG/DL — HIGH (ref 10–20)
CALCIUM SERPL-MCNC: 8.9 MG/DL — SIGNIFICANT CHANGE UP (ref 8.5–10.1)
CHLORIDE SERPL-SCNC: 97 MMOL/L — LOW (ref 98–110)
CO2 SERPL-SCNC: 22 MMOL/L — SIGNIFICANT CHANGE UP (ref 17–32)
CREAT SERPL-MCNC: 1.6 MG/DL — HIGH (ref 0.7–1.5)
CULTURE RESULTS: SIGNIFICANT CHANGE UP
EOSINOPHIL # BLD AUTO: 0.15 K/UL — SIGNIFICANT CHANGE UP (ref 0–0.7)
EOSINOPHIL NFR BLD AUTO: 0.9 % — SIGNIFICANT CHANGE UP (ref 0–8)
GLUCOSE SERPL-MCNC: 106 MG/DL — HIGH (ref 70–99)
HCT VFR BLD CALC: 32.5 % — LOW (ref 37–47)
HGB BLD-MCNC: 10.4 G/DL — LOW (ref 12–16)
LYMPHOCYTES # BLD AUTO: 17.5 % — LOW (ref 20.5–51.1)
LYMPHOCYTES # BLD AUTO: 2.99 K/UL — SIGNIFICANT CHANGE UP (ref 1.2–3.4)
MACROCYTES BLD QL: SLIGHT — SIGNIFICANT CHANGE UP
MAGNESIUM SERPL-MCNC: 1.9 MG/DL — SIGNIFICANT CHANGE UP (ref 1.8–2.4)
MANUAL SMEAR VERIFICATION: SIGNIFICANT CHANGE UP
MCHC RBC-ENTMCNC: 28.9 PG — SIGNIFICANT CHANGE UP (ref 27–31)
MCHC RBC-ENTMCNC: 32 G/DL — SIGNIFICANT CHANGE UP (ref 32–37)
MCV RBC AUTO: 90.3 FL — SIGNIFICANT CHANGE UP (ref 81–99)
MONOCYTES # BLD AUTO: 2.55 K/UL — HIGH (ref 0.1–0.6)
MONOCYTES NFR BLD AUTO: 14.9 % — HIGH (ref 1.7–9.3)
NEUTROPHILS # BLD AUTO: 10.65 K/UL — HIGH (ref 1.4–6.5)
NEUTROPHILS NFR BLD AUTO: 61.4 % — SIGNIFICANT CHANGE UP (ref 42.2–75.2)
NEUTS BAND # BLD: 0.9 % — SIGNIFICANT CHANGE UP (ref 0–6)
NRBC # BLD: 0 /100 WBCS — SIGNIFICANT CHANGE UP (ref 0–0)
PLAT MORPH BLD: NORMAL — SIGNIFICANT CHANGE UP
PLATELET # BLD AUTO: 220 K/UL — SIGNIFICANT CHANGE UP (ref 130–400)
POTASSIUM SERPL-MCNC: 5.3 MMOL/L — HIGH (ref 3.5–5)
POTASSIUM SERPL-SCNC: 5.3 MMOL/L — HIGH (ref 3.5–5)
RBC # BLD: 3.6 M/UL — LOW (ref 4.2–5.4)
RBC # FLD: 13.1 % — SIGNIFICANT CHANGE UP (ref 11.5–14.5)
RBC BLD AUTO: NORMAL — SIGNIFICANT CHANGE UP
SODIUM SERPL-SCNC: 136 MMOL/L — SIGNIFICANT CHANGE UP (ref 135–146)
SPECIMEN SOURCE: SIGNIFICANT CHANGE UP
VARIANT LYMPHS # BLD: 3.5 % — SIGNIFICANT CHANGE UP (ref 0–5)
WBC # BLD: 17.09 K/UL — HIGH (ref 4.8–10.8)
WBC # FLD AUTO: 17.09 K/UL — HIGH (ref 4.8–10.8)

## 2018-11-11 RX ORDER — ONDANSETRON 8 MG/1
4 TABLET, FILM COATED ORAL ONCE
Qty: 0 | Refills: 0 | Status: COMPLETED | OUTPATIENT
Start: 2018-11-11 | End: 2018-11-11

## 2018-11-11 RX ORDER — SODIUM CHLORIDE 9 MG/ML
1000 INJECTION INTRAMUSCULAR; INTRAVENOUS; SUBCUTANEOUS
Qty: 0 | Refills: 0 | Status: DISCONTINUED | OUTPATIENT
Start: 2018-11-11 | End: 2018-11-14

## 2018-11-11 RX ADMIN — HEPARIN SODIUM 5000 UNIT(S): 5000 INJECTION INTRAVENOUS; SUBCUTANEOUS at 06:35

## 2018-11-11 RX ADMIN — SIMETHICONE 80 MILLIGRAM(S): 80 TABLET, CHEWABLE ORAL at 14:47

## 2018-11-11 RX ADMIN — Medication 50 MILLIGRAM(S): at 17:01

## 2018-11-11 RX ADMIN — ATORVASTATIN CALCIUM 40 MILLIGRAM(S): 80 TABLET, FILM COATED ORAL at 21:52

## 2018-11-11 RX ADMIN — Medication 200 MILLIGRAM(S): at 11:34

## 2018-11-11 RX ADMIN — Medication 100 MILLIGRAM(S): at 21:51

## 2018-11-11 RX ADMIN — HEPARIN SODIUM 5000 UNIT(S): 5000 INJECTION INTRAVENOUS; SUBCUTANEOUS at 14:46

## 2018-11-11 RX ADMIN — Medication 40 MILLIGRAM(S): at 06:44

## 2018-11-11 RX ADMIN — LIDOCAINE 1 PATCH: 4 CREAM TOPICAL at 23:26

## 2018-11-11 RX ADMIN — MORPHINE SULFATE 2 MILLIGRAM(S): 50 CAPSULE, EXTENDED RELEASE ORAL at 23:30

## 2018-11-11 RX ADMIN — SIMETHICONE 80 MILLIGRAM(S): 80 TABLET, CHEWABLE ORAL at 21:52

## 2018-11-11 RX ADMIN — Medication 100 MILLIGRAM(S): at 06:35

## 2018-11-11 RX ADMIN — Medication 100 MILLIGRAM(S): at 14:46

## 2018-11-11 RX ADMIN — MORPHINE SULFATE 2 MILLIGRAM(S): 50 CAPSULE, EXTENDED RELEASE ORAL at 17:00

## 2018-11-11 RX ADMIN — HEPARIN SODIUM 5000 UNIT(S): 5000 INJECTION INTRAVENOUS; SUBCUTANEOUS at 21:51

## 2018-11-11 RX ADMIN — LATANOPROST 1 DROP(S): 0.05 SOLUTION/ DROPS OPHTHALMIC; TOPICAL at 21:51

## 2018-11-11 RX ADMIN — Medication 1 MILLIGRAM(S): at 11:35

## 2018-11-11 RX ADMIN — PANTOPRAZOLE SODIUM 40 MILLIGRAM(S): 20 TABLET, DELAYED RELEASE ORAL at 06:44

## 2018-11-11 RX ADMIN — ONDANSETRON 4 MILLIGRAM(S): 8 TABLET, FILM COATED ORAL at 21:51

## 2018-11-11 RX ADMIN — SIMETHICONE 80 MILLIGRAM(S): 80 TABLET, CHEWABLE ORAL at 06:44

## 2018-11-11 RX ADMIN — CALCITRIOL 0.25 MICROGRAM(S): 0.5 CAPSULE ORAL at 11:35

## 2018-11-11 RX ADMIN — Medication 50 MILLIGRAM(S): at 06:44

## 2018-11-11 RX ADMIN — SODIUM CHLORIDE 30 MILLILITER(S): 9 INJECTION INTRAMUSCULAR; INTRAVENOUS; SUBCUTANEOUS at 10:16

## 2018-11-11 RX ADMIN — LIDOCAINE 1 PATCH: 4 CREAM TOPICAL at 11:35

## 2018-11-11 NOTE — PROGRESS NOTE ADULT - SUBJECTIVE AND OBJECTIVE BOX
LENGTH OF HOSPITAL STAY: 7d    CHIEF COMPLAINT:   Patient is a 83y old  Female who presents with a chief complaint of chest pain and SOB (10 Nov 2018 19:26)      HISTORY OF PRESENTING ILLNESS:    HPI:  83 yr old female with pmhx of HTN, CKD 4, recent DVT/PE (thought provoked by travel, completed 6mo a/c and no longer on Eliquis) CHF s/p PPM presented to ER for cough and chest pain. As per patient, she developed non productive cough for the past 2 days with no fevers. Today morning 3am she woke up with pain in the L flank and b/l knees that is 6-8/10 intermittent and she also had one episode of chest tightness centrally located, non radiating which resolved on its own. She denies any fever, chills, headache, urinary or bowel issues. No recent travel or sick contacts. (04 Nov 2018 19:50)    PAST MEDICAL & SURGICAL HISTORY  PAST MEDICAL & SURGICAL HISTORY:  Hypertension  Pacemaker  Chronic kidney disease  Artificial pacemaker    SOCIAL HISTORY:    ALLERGIES:  Keflex (Unknown)    MEDICATIONS:  STANDING MEDICATIONS  atorvastatin 40 milliGRAM(s) Oral at bedtime  calcitriol   Capsule 0.25 MICROGram(s) Oral daily  ciprofloxacin   IVPB      ciprofloxacin   IVPB 400 milliGRAM(s) IV Intermittent daily  folic acid 1 milliGRAM(s) Oral daily  furosemide    Tablet 40 milliGRAM(s) Oral daily  heparin  Injectable 5000 Unit(s) SubCutaneous every 8 hours  influenza   Vaccine 0.5 milliLiter(s) IntraMuscular once  insulin regular  human recombinant. 10 Unit(s) IV Push once  latanoprost 0.005% Ophthalmic Solution 1 Drop(s) Both EYES at bedtime  lidocaine   Patch 1 Patch Transdermal daily  metoprolol tartrate 50 milliGRAM(s) Oral two times a day  metroNIDAZOLE  IVPB 500 milliGRAM(s) IV Intermittent every 8 hours  metroNIDAZOLE  IVPB      pantoprazole    Tablet 40 milliGRAM(s) Oral before breakfast  simethicone 80 milliGRAM(s) Chew three times a day  sodium chloride 0.9%. 1000 milliLiter(s) IV Continuous <Continuous>    PRN MEDICATIONS  acetaminophen   Tablet .. 650 milliGRAM(s) Oral every 6 hours PRN  morphine  - Injectable 2 milliGRAM(s) IV Push every 4 hours PRN  oxyCODONE    5 mG/acetaminophen 325 mG 1 Tablet(s) Oral every 6 hours PRN    VITALS:   T(F): 96.8  HR: 66  BP: 140/77  RR: 17  SpO2: 95%    LABS:                        10.4   17.09 )-----------( 220      ( 11 Nov 2018 04:30 )             32.5     11-11    136  |  97<L>  |  33<H>  ----------------------------<  106<H>  5.3<H>   |  22  |  1.6<H>    Ca    8.9      11 Nov 2018 04:30  Mg     1.9     11-11            Lactate, Blood: 2.2 mmol/L (11-10-18 @ 17:01)    < from: CT Abdomen and Pelvis No Cont (11.10.18 @ 21:20) >    EXAM:  CT ABDOMEN AND PELVIS            PROCEDURE DATE:  11/10/2018            INTERPRETATION:  CLINICAL STATEMENT: Colitis/diverticulitis. Left lower   quadrant pain.      TECHNIQUE: Contiguous axial CT images were obtained from the lower chest   to the pubic symphysis without intravenous contrast.  Oral contrast was   not administered.  Reformatted images in the coronal and sagittal planes   were acquired.    COMPARISON CT: CT abdomen and pelvis 11/4/2018.    OTHER STUDIES USED FOR CORRELATION: None.       FINDINGS:    Limited evaluation of solid organs and vascular structures secondary to   lack  of intravenous contrast.    LOWER CHEST: Bibasilar subsegmental atelectasis. Stable left lower lobe  calcified granuloma. Stable cardiomegaly and partially imaged cardiac   pacing: Post cholecystectomy. Unremarkable evaluation of the liver.    SPLEEN: Unremarkable.    PANCREAS: Unremarkable.    ADRENAL GLANDS: Unremarkable.    Interval development of sigmoid diverticulitis. No fluid collection or   pneumoperitoneum.      < end of copied text >        Culture - Urine (collected 09 Nov 2018 21:00)  Source: .Urine Catheterized  Final Report (11 Nov 2018 08:47):    Culture grew 3 or more types of organisms which indicate    collection contamination; consider recollection only if clinically    indicated.          RADIOLOGY:    PHYSICAL EXAM:  GEN: No acute distress  HEENT:   LUNGS: Clear to auscultation bilaterally   HEART: S1/S2 present. RRR.   ABD: Soft, non-tender, non-distended. Bowel sounds present  EXT:  NEURO: AAOX3

## 2018-11-11 NOTE — PROGRESS NOTE ADULT - ASSESSMENT
Patient still with LLQ pain which has improved but wbc has increased to 17K.  Patient was seen by Surgery.  Had a repeat noncontrast CT of Abd and Pelvis which showed new developement of acute sigmoid diverticulitis.  Patient on IV antibiotics Cipro and Flagyl.

## 2018-11-12 LAB
ALBUMIN SERPL ELPH-MCNC: 2.9 G/DL — LOW (ref 3.5–5.2)
ALP SERPL-CCNC: 134 U/L — HIGH (ref 30–115)
ALT FLD-CCNC: 34 U/L — SIGNIFICANT CHANGE UP (ref 0–41)
ANION GAP SERPL CALC-SCNC: 17 MMOL/L — HIGH (ref 7–14)
AST SERPL-CCNC: 51 U/L — HIGH (ref 0–41)
BILIRUB SERPL-MCNC: 0.3 MG/DL — SIGNIFICANT CHANGE UP (ref 0.2–1.2)
BUN SERPL-MCNC: 50 MG/DL — HIGH (ref 10–20)
CALCIUM SERPL-MCNC: 8.3 MG/DL — LOW (ref 8.5–10.1)
CHLORIDE SERPL-SCNC: 88 MMOL/L — LOW (ref 98–110)
CO2 SERPL-SCNC: 23 MMOL/L — SIGNIFICANT CHANGE UP (ref 17–32)
CREAT SERPL-MCNC: 2.7 MG/DL — HIGH (ref 0.7–1.5)
ERYTHROCYTE [SEDIMENTATION RATE] IN BLOOD: 109 MM/HR — HIGH (ref 0–20)
GLUCOSE SERPL-MCNC: 118 MG/DL — HIGH (ref 70–99)
HCT VFR BLD CALC: 28.5 % — LOW (ref 37–47)
HGB BLD-MCNC: 9.3 G/DL — LOW (ref 12–16)
MAGNESIUM SERPL-MCNC: 1.8 MG/DL — SIGNIFICANT CHANGE UP (ref 1.8–2.4)
MCHC RBC-ENTMCNC: 29.3 PG — SIGNIFICANT CHANGE UP (ref 27–31)
MCHC RBC-ENTMCNC: 32.6 G/DL — SIGNIFICANT CHANGE UP (ref 32–37)
MCV RBC AUTO: 89.9 FL — SIGNIFICANT CHANGE UP (ref 81–99)
NRBC # BLD: 0 /100 WBCS — SIGNIFICANT CHANGE UP (ref 0–0)
PHOSPHATE SERPL-MCNC: 4.5 MG/DL — SIGNIFICANT CHANGE UP (ref 2.1–4.9)
PLATELET # BLD AUTO: 227 K/UL — SIGNIFICANT CHANGE UP (ref 130–400)
POTASSIUM SERPL-MCNC: 4.5 MMOL/L — SIGNIFICANT CHANGE UP (ref 3.5–5)
POTASSIUM SERPL-SCNC: 4.5 MMOL/L — SIGNIFICANT CHANGE UP (ref 3.5–5)
PROT SERPL-MCNC: 5.6 G/DL — LOW (ref 6–8)
RBC # BLD: 3.17 M/UL — LOW (ref 4.2–5.4)
RBC # FLD: 13.1 % — SIGNIFICANT CHANGE UP (ref 11.5–14.5)
SODIUM SERPL-SCNC: 128 MMOL/L — LOW (ref 135–146)
URATE SERPL-MCNC: 9.4 MG/DL — HIGH (ref 2.5–7)
WBC # BLD: 13.13 K/UL — HIGH (ref 4.8–10.8)
WBC # FLD AUTO: 13.13 K/UL — HIGH (ref 4.8–10.8)

## 2018-11-12 RX ORDER — FERROUS SULFATE 325(65) MG
325 TABLET ORAL DAILY
Qty: 0 | Refills: 0 | Status: DISCONTINUED | OUTPATIENT
Start: 2018-11-12 | End: 2018-11-20

## 2018-11-12 RX ORDER — MORPHINE SULFATE 50 MG/1
2 CAPSULE, EXTENDED RELEASE ORAL ONCE
Qty: 0 | Refills: 0 | Status: DISCONTINUED | OUTPATIENT
Start: 2018-11-12 | End: 2018-11-12

## 2018-11-12 RX ADMIN — HEPARIN SODIUM 5000 UNIT(S): 5000 INJECTION INTRAVENOUS; SUBCUTANEOUS at 21:24

## 2018-11-12 RX ADMIN — HEPARIN SODIUM 5000 UNIT(S): 5000 INJECTION INTRAVENOUS; SUBCUTANEOUS at 14:06

## 2018-11-12 RX ADMIN — Medication 100 MILLIGRAM(S): at 21:24

## 2018-11-12 RX ADMIN — SIMETHICONE 80 MILLIGRAM(S): 80 TABLET, CHEWABLE ORAL at 14:06

## 2018-11-12 RX ADMIN — Medication 1 MILLIGRAM(S): at 12:03

## 2018-11-12 RX ADMIN — LIDOCAINE 1 PATCH: 4 CREAM TOPICAL at 18:20

## 2018-11-12 RX ADMIN — HEPARIN SODIUM 5000 UNIT(S): 5000 INJECTION INTRAVENOUS; SUBCUTANEOUS at 06:16

## 2018-11-12 RX ADMIN — LIDOCAINE 1 PATCH: 4 CREAM TOPICAL at 12:04

## 2018-11-12 RX ADMIN — MORPHINE SULFATE 2 MILLIGRAM(S): 50 CAPSULE, EXTENDED RELEASE ORAL at 03:29

## 2018-11-12 RX ADMIN — Medication 650 MILLIGRAM(S): at 06:31

## 2018-11-12 RX ADMIN — PANTOPRAZOLE SODIUM 40 MILLIGRAM(S): 20 TABLET, DELAYED RELEASE ORAL at 06:14

## 2018-11-12 RX ADMIN — Medication 100 MILLIGRAM(S): at 14:05

## 2018-11-12 RX ADMIN — SIMETHICONE 80 MILLIGRAM(S): 80 TABLET, CHEWABLE ORAL at 21:24

## 2018-11-12 RX ADMIN — MORPHINE SULFATE 2 MILLIGRAM(S): 50 CAPSULE, EXTENDED RELEASE ORAL at 02:07

## 2018-11-12 RX ADMIN — Medication 200 MILLIGRAM(S): at 12:03

## 2018-11-12 RX ADMIN — Medication 50 MILLIGRAM(S): at 17:07

## 2018-11-12 RX ADMIN — CALCITRIOL 0.25 MICROGRAM(S): 0.5 CAPSULE ORAL at 12:04

## 2018-11-12 RX ADMIN — Medication 100 MILLIGRAM(S): at 06:14

## 2018-11-12 RX ADMIN — MORPHINE SULFATE 2 MILLIGRAM(S): 50 CAPSULE, EXTENDED RELEASE ORAL at 00:33

## 2018-11-12 RX ADMIN — Medication 40 MILLIGRAM(S): at 06:14

## 2018-11-12 RX ADMIN — LATANOPROST 1 DROP(S): 0.05 SOLUTION/ DROPS OPHTHALMIC; TOPICAL at 21:24

## 2018-11-12 RX ADMIN — ATORVASTATIN CALCIUM 40 MILLIGRAM(S): 80 TABLET, FILM COATED ORAL at 21:24

## 2018-11-12 RX ADMIN — Medication 650 MILLIGRAM(S): at 03:43

## 2018-11-12 RX ADMIN — Medication 650 MILLIGRAM(S): at 17:59

## 2018-11-12 RX ADMIN — Medication 50 MILLIGRAM(S): at 06:16

## 2018-11-12 RX ADMIN — SIMETHICONE 80 MILLIGRAM(S): 80 TABLET, CHEWABLE ORAL at 06:15

## 2018-11-12 RX ADMIN — Medication 60 MILLIGRAM(S): at 21:57

## 2018-11-12 NOTE — DIETITIAN INITIAL EVALUATION ADULT. - OTHER INFO
p/w chest pain + SOB w/ cough for 2d PTA. Now LLQ pain has improved but wbc has inreased. seen by Surgery. Had CT and abd+ pelvic which showed new acute sigmoid diverticulitis. Now on IV Abx and Flagyl. L knee pain likely gouty arthritis. No surgery plan yet.

## 2018-11-12 NOTE — PROGRESS NOTE ADULT - SUBJECTIVE AND OBJECTIVE BOX
LENGTH OF HOSPITAL STAY: 8d    CHIEF COMPLAINT:   Patient is a 83y old  Female who presents with a chief complaint of chest pain and SOB (12 Nov 2018 00:20)      HISTORY OF PRESENTING ILLNESS:    HPI:  83 yr old female with pmhx of HTN, CKD 4, recent DVT/PE (thought provoked by travel, completed 6mo a/c and no longer on Eliquis) CHF s/p PPM presented to ER for cough and chest pain. As per patient, she developed non productive cough for the past 2 days with no fevers. Today morning 3am she woke up with pain in the L flank and b/l knees that is 6-8/10 intermittent and she also had one episode of chest tightness centrally located, non radiating which resolved on its own. She denies any fever, chills, headache, urinary or bowel issues. No recent travel or sick contacts. (04 Nov 2018 19:50)    Patient c/o of left lower quad pain not improved. C/O left knee pain and swelling which as developed since yesterday    PAST MEDICAL & SURGICAL HISTORY  PAST MEDICAL & SURGICAL HISTORY:  Hypertension  Pacemaker  Chronic kidney disease  Artificial pacemaker    SOCIAL HISTORY:    ALLERGIES:  Keflex (Unknown)    MEDICATIONS:  STANDING MEDICATIONS  atorvastatin 40 milliGRAM(s) Oral at bedtime  calcitriol   Capsule 0.25 MICROGram(s) Oral daily  ciprofloxacin   IVPB      ciprofloxacin   IVPB 400 milliGRAM(s) IV Intermittent daily  folic acid 1 milliGRAM(s) Oral daily  furosemide    Tablet 40 milliGRAM(s) Oral daily  heparin  Injectable 5000 Unit(s) SubCutaneous every 8 hours  influenza   Vaccine 0.5 milliLiter(s) IntraMuscular once  insulin regular  human recombinant. 10 Unit(s) IV Push once  latanoprost 0.005% Ophthalmic Solution 1 Drop(s) Both EYES at bedtime  lidocaine   Patch 1 Patch Transdermal daily  metoprolol tartrate 50 milliGRAM(s) Oral two times a day  metroNIDAZOLE  IVPB 500 milliGRAM(s) IV Intermittent every 8 hours  metroNIDAZOLE  IVPB      pantoprazole    Tablet 40 milliGRAM(s) Oral before breakfast  simethicone 80 milliGRAM(s) Chew three times a day  sodium chloride 0.9%. 1000 milliLiter(s) IV Continuous <Continuous>    PRN MEDICATIONS  acetaminophen   Tablet .. 650 milliGRAM(s) Oral every 6 hours PRN    VITALS:   T(F): 98.8  HR: 62  BP: 125/58  RR: 18  SpO2: 95%    LABS:                        10.4   17.09 )-----------( 220      ( 11 Nov 2018 04:30 )             32.5     11-11    136  |  97<L>  |  33<H>  ----------------------------<  106<H>  5.3<H>   |  22  |  1.6<H>    Ca    8.9      11 Nov 2018 04:30  Mg     1.9     11-11                Culture - Urine (collected 09 Nov 2018 21:00)  Source: .Urine Catheterized  Final Report (11 Nov 2018 08:47):    Culture grew 3 or more types of organisms which indicate    collection contamination; consider recollection only if clinically    indicated.          RADIOLOGY:    PHYSICAL EXAM:  GEN: No acute distress  HEENT:   LUNGS: Clear to auscultation bilaterally   HEART: S1/S2 present. RRR.   ABD: Soft, non-tender, non-distended. Bowel sounds present  EXT: pain and swelling in the Left Knee  NEURO: AAOX3

## 2018-11-12 NOTE — DIETITIAN INITIAL EVALUATION ADULT. - ORAL INTAKE PTA
good/PTA good appetite, but requires GERD diet and VERY SOFT foods due to MISSING TEETH. Takes Vitamin B and C. NKFA. Prefers no greasy foods.

## 2018-11-12 NOTE — CONSULT NOTE ADULT - SUBJECTIVE AND OBJECTIVE BOX
NEPHROLOGY CONSULTATION NOTE    Patient is a 83y Female pmh HTN, CKD 4 (baseline cr. ~ 1.95 - 2.2), PPM presented to the hospital with right side chest pain and dry cough. Pt reports that she initially developed left flank pain and b/l knee pain and later developed right side chest pain and dry cough which progressed to productive cough with yellowish sputum. Pt is treated as community acquired PNA, Pt also complains of LLQ pain for many days, dysuria, increased frequency and urgency and also weakness and numbness in fingers of both hands. Pt denies any SOB, palpitations, diarrhea, n/v. constipation,     PAST MEDICAL & SURGICAL HISTORY:  Hypertension  Pacemaker  Chronic kidney disease  Artificial pacemaker    Allergies:  Keflex (Unknown)    Home Medications Reviewed  Hospital Medications:   MEDICATIONS  (STANDING):  atorvastatin 40 milliGRAM(s) Oral at bedtime  calcitriol   Capsule 0.25 MICROGram(s) Oral daily  ciprofloxacin   IVPB      ciprofloxacin   IVPB 400 milliGRAM(s) IV Intermittent daily  folic acid 1 milliGRAM(s) Oral daily  furosemide    Tablet 40 milliGRAM(s) Oral daily  heparin  Injectable 5000 Unit(s) SubCutaneous every 8 hours  influenza   Vaccine 0.5 milliLiter(s) IntraMuscular once  insulin regular  human recombinant. 10 Unit(s) IV Push once  latanoprost 0.005% Ophthalmic Solution 1 Drop(s) Both EYES at bedtime  lidocaine   Patch 1 Patch Transdermal daily  methylPREDNISolone sodium succinate Injectable 60 milliGRAM(s) IV Push once  metoprolol tartrate 50 milliGRAM(s) Oral two times a day  metroNIDAZOLE  IVPB 500 milliGRAM(s) IV Intermittent every 8 hours  metroNIDAZOLE  IVPB      pantoprazole    Tablet 40 milliGRAM(s) Oral before breakfast  simethicone 80 milliGRAM(s) Chew three times a day  sodium chloride 0.9%. 1000 milliLiter(s) (30 mL/Hr) IV Continuous <Continuous>      SOCIAL HISTORY:  Denies ETOH,Smoking,   FAMILY HISTORY:  No pertinent family history in first degree relatives        REVIEW OF SYSTEMS:    All other review of systems is negative unless indicated above.    VITALS:  T(F): 99.9 (18 @ 12:42), Max: 100.1 (18 @ 21:39)  HR: 62 (18 @ 08:00)  BP: 125/58 (18 @ 08:00)  RR: 18 (18 @ 08:00)       @ 07:01  -   @ 07:00  --------------------------------------------------------  IN: 540 mL / OUT: 600 mL / NET: -60 mL      Height (cm): 160 ( @ 10:21)      I&O's Detail    2018 07:01  -  2018 07:00  --------------------------------------------------------  IN:    IV PiggyBack: 300 mL    sodium chloride 0.9%.: 240 mL  Total IN: 540 mL    OUT:    Voided: 600 mL  Total OUT: 600 mL    Total NET: -60 mL            PHYSICAL EXAM:  Constitutional: Pt is pyrexic  Respiratory: CTAB, no wheezes, rales or rhonchi  Cardiovascular: S1, S2, RRR  Gastrointestinal: BS+, LLQ tenderness +, mild epigastric tenderness +  Extremities: No peripheral edema, both knees warm to touch  Neurological: A/O x 3  : No carlos.     Vascular Access:    LABS:      136  |  97<L>  |  33<H>  ----------------------------<  106<H>  5.3<H>   |  22  |  1.6<H>    Ca    8.9      2018 04:30  Mg     1.9           Creatinine Trend: 1.6 <--, 2.2 <--, 2.2 <--, 2.3 <--, 2.2 <--, 2.2 <--, 2.3 <--                        10.4   17.09 )-----------( 220      ( 2018 04:30 )             32.5     Urine Studies:  Urinalysis Basic - ( 2018 22:00 )    Color: Yellow / Appearance: Clear / S.010 / pH:   Gluc:  / Ketone: Negative  / Bili: Negative / Urobili: 0.2   Blood:  / Protein: Negative / Nitrite: Negative   Leuk Esterase: Negative / RBC:  / WBC    Sq Epi:  / Non Sq Epi:  / Bacteria:           RADIOLOGY & ADDITIONAL STUDIES:  < from: CT Abdomen and Pelvis No Cont (11.10.18 @ 21:20) >    KIDNEYS: No obstructing renal stones. Stable phleboliths in the pelvis.    IMPRESSION:    Since abdominal CT 2018:    Interval development of sigmoid diverticulitis. No fluid collection or   pneumoperitoneum.    < end of copied text >    < from: US Pelvis Complete (18 @ 12:34) >  IMPRESSION:    Post hysterectomy and bilateral oophorectomy.    No free pelvic fluid. No abnormal soft tissue mass in the pelvis.    < end of copied text >

## 2018-11-12 NOTE — DIETITIAN INITIAL EVALUATION ADULT. - MD RECOMMEND
When medically feasible to advance diet to clear or full liquids, please order ENSURE CLEAR q12hr along with it. IF TOLERATED, goal diet to advance to is DYSPHAGIA 2 MECHANICAL SOFT w/ Thins and LOW RESIDUE. Because pt reports of poor dentition and needs very soft foods.

## 2018-11-12 NOTE — DIETITIAN INITIAL EVALUATION ADULT. - PERTINENT MEDS FT
heparin, abx, methylprednisolone, insulin, acetaminophen, atorvastatin, b9, furosemide, protonix, metoprolol

## 2018-11-12 NOTE — DIETITIAN INITIAL EVALUATION ADULT. - SOURCE
Pt has been in the ED since admission. JUST transferred to  11/11 late at night. Pt reported eating for few days then stopped since last Wed/Thurs. Npo Since/patient/other (specify)

## 2018-11-12 NOTE — DIETITIAN INITIAL EVALUATION ADULT. - DIET TYPE
NPO/Been NPO since possibly last Wed/Thurs per pt report. Really want to drink something. On IVF now.

## 2018-11-12 NOTE — PROGRESS NOTE ADULT - ASSESSMENT
Patient still with LLQ pain which has improved but wbc has increased to 17K.  Patient was seen by Surgery.  Had a repeat noncontrast CT of Abd and Pelvis which showed new developement of acute sigmoid diverticulitis.  Patient on IV antibiotics Cipro and Flagyl. Left knee pain most like due to Gouty Arthritis and will give 1 dose of Solumedrol for now.

## 2018-11-12 NOTE — PROGRESS NOTE ADULT - SUBJECTIVE AND OBJECTIVE BOX
Hospital Day:  8d    Subjective:  Overnight patient had some pain. Seen and examined at bedside. States knee pain and swelling along with a little bit of abdominal pain. No fevers, chills, nausea, vomiting, chest pain, shortness of breath, constipation, diarrhea, weakness, fatigue.     Past Medical Hx:   Hypertension  Pacemaker  Chronic kidney disease    Past Sx:  Artificial pacemaker    Allergies:  Keflex (Unknown)    Current Meds:   Standng Meds:  atorvastatin 40 milliGRAM(s) Oral at bedtime  calcitriol   Capsule 0.25 MICROGram(s) Oral daily  ciprofloxacin   IVPB      ciprofloxacin   IVPB 400 milliGRAM(s) IV Intermittent daily  folic acid 1 milliGRAM(s) Oral daily  furosemide    Tablet 40 milliGRAM(s) Oral daily  heparin  Injectable 5000 Unit(s) SubCutaneous every 8 hours  influenza   Vaccine 0.5 milliLiter(s) IntraMuscular once  insulin regular  human recombinant. 10 Unit(s) IV Push once  latanoprost 0.005% Ophthalmic Solution 1 Drop(s) Both EYES at bedtime  lidocaine   Patch 1 Patch Transdermal daily  methylPREDNISolone sodium succinate Injectable 60 milliGRAM(s) IV Push once  metoprolol tartrate 50 milliGRAM(s) Oral two times a day  metroNIDAZOLE  IVPB 500 milliGRAM(s) IV Intermittent every 8 hours  metroNIDAZOLE  IVPB      pantoprazole    Tablet 40 milliGRAM(s) Oral before breakfast  simethicone 80 milliGRAM(s) Chew three times a day  sodium chloride 0.9%. 1000 milliLiter(s) (30 mL/Hr) IV Continuous <Continuous>    PRN Meds:  acetaminophen   Tablet .. 650 milliGRAM(s) Oral every 6 hours PRN Temp greater or equal to 38C (100.4F), Mild Pain (1 - 3)    Vital Signs:   T(F): 99.9 (11-12-18 @ 12:42), Max: 100.1 (11-11-18 @ 21:39)  HR: 62 (11-12-18 @ 08:00) (62 - 66)  BP: 125/58 (11-12-18 @ 08:00) (125/58 - 187/82)  RR: 18 (11-12-18 @ 08:00) (18 - 20)  SpO2: --      11-11-18 @ 07:01  -  11-12-18 @ 07:00  --------------------------------------------------------  IN: 540 mL / OUT: 600 mL / NET: -60 mL      Physical Exam:   GENERAL: Mild distress  HEENT: NCAT, PERRLA, EOMI  CHEST/LUNG: CTA, No wheezing, rhonchi, rales  HEART: Regular rate and rhythm; s1 s2 appreciated, No murmurs, rubs, or gallops  ABDOMEN: Bowel sounds present, Mild epigastric and LLQ tenderness   EXTREMITIES: No LE edema b/l, knees bilaterally warm, swollen, and tender to palpation   NERVOUS SYSTEM:  Alert & Oriented X3    Labs:                         9.3    13.13 )-----------( 227      ( 12 Nov 2018 13:06 )             28.5       12 Nov 2018 13:06    128    |  88     |  50     ----------------------------<  118    4.5     |  23     |  2.7      Ca    8.3        12 Nov 2018 13:06  Phos  4.5       12 Nov 2018 13:06  Mg     1.8       12 Nov 2018 13:06    TPro  5.6    /  Alb  2.9    /  TBili  0.3    /  DBili  x      /  AST  51     /  ALT  34     /  AlkPhos  134    12 Nov 2018 13:06    Serum Pro-Brain Natriuretic Peptide: 7082 pg/mL (11-04-18 @ 11:32)    Radiology:   None     Assessment and Plan:   This is an 83 year old female with PMHx of CKD stage 4, Recent DVT/PE provoked by travel and completed a 6month course of Eliquis, CHF s/p PPM who presented to the ED for a cough and chest pain    #Left flank and LLQ pain   - CT 11/10 interval development of sigmoid diverticulitis with no fluid collection or pneumoperitoneum   - Continue on Cipro and Flagyl   - Surgery consulted and following. Requesting medical clearance for possible surgical intervention due to concern for surgical abdomen  - Pain management with morphine and percocet  - Consider ID consult,   - Repeat UCx and Blood Culture     #Left knee pain  - Most likely gouty arthritis  - Solumedrol 60mg x1 today     #Hyperkalemia (resolved)  - Nephrology as an outpatient   - No ACE/ARB  - Monitor K if over 5.5 Insulin and D50 with EKG     #HTN (BP stable)   - Continue on current medications     #MBD  - Check Ph, and iPTH  - Calcium WNL     #Anemia  - Start on FeSO4, Check serum iron studies     #Chest pain  - ICD interrogated and no events   - ACS ruled out   - Continue on metoprolol, Lasix, and Atorvastatin    #Cough   - Unlikely PNA, seen by pulmonary   #HTN  - c/w  lopressor, Lasix    #CKD stage 4  - stable (baseline ~ 2.3 - 2.8)  - c/w calcitriol  - monitor bmp    Diet: DASH  DVT ppx / GI ppx: Lovenox 40mg daily / Pantoprazole   Activity: ambulate as tolerated   Disposition: from home  Full code  Anticipate for d/c tomorrow - could not discharge today because NH does not take patients insurance.

## 2018-11-12 NOTE — CONSULT NOTE ADULT - ATTENDING COMMENTS
PT seen and examined  Above note reviewed, discussed w/ fellow   CKD 4 Baseline Cr mid 2's eGFR ~20  Hgb stable   BP controlled

## 2018-11-12 NOTE — PROGRESS NOTE ADULT - SUBJECTIVE AND OBJECTIVE BOX
Progress Note: General Surgery  Patient: RACHEL SUMMERS , 83y (1935)Female   MRN: [id number]  Location: [address]  Visit: 11-04-18 Inpatient  Date: 11-12-18 @ 00:20    Procedure/Diagnosis: sigmoid diverticulitis    Events/ 24h: No acute events overnight. Pain controlled.    Vitals: T(F): 99.9 (11-12-18 @ 00:16), Max: 100.1 (11-11-18 @ 21:39)  HR: 66 (11-12-18 @ 00:16)  BP: 187/82 (11-12-18 @ 00:16) (140/77 - 187/82)  RR: 18 (11-12-18 @ 00:16)  SpO2: 95% (11-11-18 @ 08:34)    In:   11-10-18 @ 07:01  -  11-11-18 @ 07:00  --------------------------------------------------------  IN: 600 mL    11-11-18 @ 07:01  -  11-12-18 @ 00:20  --------------------------------------------------------  IN: 540 mL      Out:   11-10-18 @ 07:01  -  11-11-18 @ 07:00  --------------------------------------------------------  OUT:    Voided: 300 mL  Total OUT: 300 mL      11-11-18 @ 07:01  -  11-12-18 @ 00:20  --------------------------------------------------------  OUT:    Voided: 600 mL  Total OUT: 600 mL        Net:   11-10-18 @ 07:01  -  11-11-18 @ 07:00  --------------------------------------------------------  NET: 300 mL    11-11-18 @ 07:01  -  11-12-18 @ 00:20  --------------------------------------------------------  NET: -60 mL        Diet: Diet, NPO:   Except Medications (11-11-18 @ 09:05)    IV Fluids: calcitriol   Capsule 0.25 MICROGram(s) Oral daily  folic acid 1 milliGRAM(s) Oral daily  sodium chloride 0.9%. 1000 milliLiter(s) (30 mL/Hr) IV Continuous <Continuous>      Physical Examination:  General Appearance: NAD  HEENT: EOMI, sclera non-icteric.  Heart: RRR   Lungs: CTABL.   Abdomen:  Soft, LLQ tender, nondistended. No rigidity, guarding, or rebound tenderness.   MSK/Extremities: Warm & well-perfused. Peripheral pulses intact.  Skin: Warm, dry. No jaundice.       Medications: [Standing]  atorvastatin 40 milliGRAM(s) Oral at bedtime  calcitriol   Capsule 0.25 MICROGram(s) Oral daily  ciprofloxacin   IVPB      ciprofloxacin   IVPB 400 milliGRAM(s) IV Intermittent daily  folic acid 1 milliGRAM(s) Oral daily  furosemide    Tablet 40 milliGRAM(s) Oral daily  heparin  Injectable 5000 Unit(s) SubCutaneous every 8 hours  influenza   Vaccine 0.5 milliLiter(s) IntraMuscular once  insulin regular  human recombinant. 10 Unit(s) IV Push once  latanoprost 0.005% Ophthalmic Solution 1 Drop(s) Both EYES at bedtime  lidocaine   Patch 1 Patch Transdermal daily  metoprolol tartrate 50 milliGRAM(s) Oral two times a day  metroNIDAZOLE  IVPB 500 milliGRAM(s) IV Intermittent every 8 hours  metroNIDAZOLE  IVPB      pantoprazole    Tablet 40 milliGRAM(s) Oral before breakfast  simethicone 80 milliGRAM(s) Chew three times a day  sodium chloride 0.9%. 1000 milliLiter(s) (30 mL/Hr) IV Continuous <Continuous>    DVT Prophylaxis: heparin  Injectable 5000 Unit(s) SubCutaneous every 8 hours    GI Prophylaxis: pantoprazole    Tablet 40 milliGRAM(s) Oral before breakfast    Antibiotics: ciprofloxacin   IVPB      ciprofloxacin   IVPB 400 milliGRAM(s) IV Intermittent daily  metroNIDAZOLE  IVPB 500 milliGRAM(s) IV Intermittent every 8 hours  metroNIDAZOLE  IVPB        Anticoagulation:   Medications:[PRN]  acetaminophen   Tablet .. 650 milliGRAM(s) Oral every 6 hours PRN  oxyCODONE    5 mG/acetaminophen 325 mG 1 Tablet(s) Oral every 6 hours PRN      Labs:                        10.4   17.09 )-----------( 220      ( 11 Nov 2018 04:30 )             32.5     11-11    136  |  97<L>  |  33<H>  ----------------------------<  106<H>  5.3<H>   |  22  |  1.6<H>    Ca    8.9      11 Nov 2018 04:30  Mg     1.9     11-11        Urine/Micro:    Culture - Urine (collected 09 Nov 2018 21:00)  Source: .Urine Catheterized  Final Report (11 Nov 2018 08:47):    Culture grew 3 or more types of organisms which indicate    collection contamination; consider recollection only if clinically    indicated.      Imaging:     < from: CT Abdomen and Pelvis No Cont (11.10.18 @ 21:20) >  Since abdominal CT 11/4/2018:    Interval development of sigmoid diverticulitis. No fluid collection or   pneumoperitoneum.    < end of copied text >      Assessment:  83y Female patient admitted w/ sigmoid diverticulitis    Plan:    NPO, IVF  WBC up to 17 from 11 yesterday  Cipro/flagyl  Elevated K. can give D50 and insulin if persists  DVT/GI ppx  OOBAT  IS  Pain control    Date/Time: 11-12-18 @ 00:20

## 2018-11-12 NOTE — PROVIDER CONTACT NOTE (MEDICATION) - SITUATION
Pt. complains of pain to left lower extremity rating 10 on scale from 1-10. No swelling or redness noted to site.

## 2018-11-12 NOTE — DIETITIAN INITIAL EVALUATION ADULT. - PT NOT SOURCE
LOS- pt is alert and oriented. No edema. skin intact. LBM last Saturday per pt, likely from NPO status. No oral issue when pt on diet./other (specify)

## 2018-11-12 NOTE — CONSULT NOTE ADULT - ASSESSMENT
Patient with CKD 4 (Baseline Cr. ~ 1.95 - 2.2) presented to hospital with chest pain and dry cough, LLQ pain, b/l knee pain, left flank pain, dysuria, urinary frequency  PMH HTN, CKD 4 (baseline cr. ~ 1.95 - 2.2), PPM.    # CKD 4/Hyperkalemia   - S. Cr. stable around baseline   - No need for RRT at the moment.   - Repeat BMP   - low K diet   - check UA   - Monitor I & O   - Avoid nephrotoxins and Hypotension   - Pt explained about adverse renal effects of NSAIDs and advised to avoid continuos use NSAIDs for pain.    # HTN   - BP at goal   - cont. current meds    # MBD   - Ca noted, WNL   - check Ph, iPTH   - on no binders    # Anemia   - Hb noted.   - No need for ELIJAH   - start oral FeSO4, check serum Fe studies   - Monitor h/h. Tx if Hb < 7    # Sepsis/ PNA/ Arthritis/ UTI/ Fever   - consider ID consult please   - cont. Abx   - check UCx, BCx    Will follow Patient with CKD 4 (Baseline Cr. ~ 1.95 - 2.2) presented to hospital with chest pain and dry cough, LLQ pain, b/l knee pain, left flank pain, dysuria, urinary frequency  PMH HTN, CKD 4 (baseline cr. ~ 1.95 - 2.2), PPM.    # CKD 4/Hyperkalemia   - S. Cr. stable around baseline   - No need for RRT at the moment.   - low K diet   - Repeat BMP, if K > 5.5 on repeat BMP check EKG and give single dose of insulin and dextrose   - Monitor K level and EKG closely   - check UA   - Monitor I & O   - Avoid nephrotoxins and Hypotension   - Pt explained about adverse renal effects of NSAIDs and advised to avoid continuos use NSAIDs for pain.    # HTN   - BP at goal   - cont. current meds    # MBD   - Ca noted, WNL   - check Ph, iPTH   - on no binders    # Anemia   - Hb noted.   - No need for ELIJAH   - start oral FeSO4, check serum Fe studies   - Monitor h/h. Tx if Hb < 7    # Sepsis/ PNA/ Arthritis/ UTI/ Fever   - consider ID consult please   - cont. Abx   - check UCx, BCx    Will follow

## 2018-11-13 ENCOUNTER — APPOINTMENT (OUTPATIENT)
Dept: CARDIOLOGY | Facility: CLINIC | Age: 83
End: 2018-11-13

## 2018-11-13 LAB
ALBUMIN SERPL ELPH-MCNC: 2.9 G/DL — LOW (ref 3.5–5.2)
ALP SERPL-CCNC: 157 U/L — HIGH (ref 30–115)
ALT FLD-CCNC: 31 U/L — SIGNIFICANT CHANGE UP (ref 0–41)
ANION GAP SERPL CALC-SCNC: 21 MMOL/L — HIGH (ref 7–14)
AST SERPL-CCNC: 42 U/L — HIGH (ref 0–41)
BASOPHILS # BLD AUTO: 0.03 K/UL — SIGNIFICANT CHANGE UP (ref 0–0.2)
BASOPHILS NFR BLD AUTO: 0.2 % — SIGNIFICANT CHANGE UP (ref 0–1)
BILIRUB SERPL-MCNC: 0.3 MG/DL — SIGNIFICANT CHANGE UP (ref 0.2–1.2)
BUN SERPL-MCNC: 59 MG/DL — HIGH (ref 10–20)
CALCIUM SERPL-MCNC: 8.8 MG/DL — SIGNIFICANT CHANGE UP (ref 8.4–10.5)
CALCIUM SERPL-MCNC: 9.1 MG/DL — SIGNIFICANT CHANGE UP (ref 8.5–10.1)
CHLORIDE SERPL-SCNC: 95 MMOL/L — LOW (ref 98–110)
CO2 SERPL-SCNC: 21 MMOL/L — SIGNIFICANT CHANGE UP (ref 17–32)
CREAT SERPL-MCNC: 2.9 MG/DL — HIGH (ref 0.7–1.5)
EOSINOPHIL # BLD AUTO: 0 K/UL — SIGNIFICANT CHANGE UP (ref 0–0.7)
EOSINOPHIL NFR BLD AUTO: 0 % — SIGNIFICANT CHANGE UP (ref 0–8)
FERRITIN SERPL-MCNC: 476 NG/ML — HIGH (ref 15–150)
GLUCOSE SERPL-MCNC: 110 MG/DL — HIGH (ref 70–99)
HCT VFR BLD CALC: 31 % — LOW (ref 37–47)
HGB BLD-MCNC: 10 G/DL — LOW (ref 12–16)
IMM GRANULOCYTES NFR BLD AUTO: 1 % — HIGH (ref 0.1–0.3)
IRON SATN MFR SERPL: 10 % — LOW (ref 15–50)
IRON SATN MFR SERPL: 18 UG/DL — LOW (ref 35–150)
LYMPHOCYTES # BLD AUTO: 1.03 K/UL — LOW (ref 1.2–3.4)
LYMPHOCYTES # BLD AUTO: 8.1 % — LOW (ref 20.5–51.1)
MAGNESIUM SERPL-MCNC: 1.9 MG/DL — SIGNIFICANT CHANGE UP (ref 1.8–2.4)
MCHC RBC-ENTMCNC: 29.1 PG — SIGNIFICANT CHANGE UP (ref 27–31)
MCHC RBC-ENTMCNC: 32.3 G/DL — SIGNIFICANT CHANGE UP (ref 32–37)
MCV RBC AUTO: 90.1 FL — SIGNIFICANT CHANGE UP (ref 81–99)
MONOCYTES # BLD AUTO: 0.29 K/UL — SIGNIFICANT CHANGE UP (ref 0.1–0.6)
MONOCYTES NFR BLD AUTO: 2.3 % — SIGNIFICANT CHANGE UP (ref 1.7–9.3)
NEUTROPHILS # BLD AUTO: 11.2 K/UL — HIGH (ref 1.4–6.5)
NEUTROPHILS NFR BLD AUTO: 88.4 % — HIGH (ref 42.2–75.2)
NRBC # BLD: 0 /100 WBCS — SIGNIFICANT CHANGE UP (ref 0–0)
PHOSPHATE SERPL-MCNC: 5.9 MG/DL — HIGH (ref 2.1–4.9)
PLATELET # BLD AUTO: 276 K/UL — SIGNIFICANT CHANGE UP (ref 130–400)
POTASSIUM SERPL-MCNC: 4.7 MMOL/L — SIGNIFICANT CHANGE UP (ref 3.5–5)
POTASSIUM SERPL-SCNC: 4.7 MMOL/L — SIGNIFICANT CHANGE UP (ref 3.5–5)
PROT SERPL-MCNC: 6.4 G/DL — SIGNIFICANT CHANGE UP (ref 6–8)
PTH-INTACT FLD-MCNC: 65 PG/ML — SIGNIFICANT CHANGE UP (ref 15–65)
RBC # BLD: 3.44 M/UL — LOW (ref 4.2–5.4)
RBC # FLD: 13.2 % — SIGNIFICANT CHANGE UP (ref 11.5–14.5)
SODIUM SERPL-SCNC: 137 MMOL/L — SIGNIFICANT CHANGE UP (ref 135–146)
TIBC SERPL-MCNC: 181 UG/DL — LOW (ref 220–430)
TRANSFERRIN SERPL-MCNC: 156 MG/DL — LOW (ref 200–360)
UIBC SERPL-MCNC: 163 UG/DL — SIGNIFICANT CHANGE UP (ref 110–370)
WBC # BLD: 12.68 K/UL — HIGH (ref 4.8–10.8)
WBC # FLD AUTO: 12.68 K/UL — HIGH (ref 4.8–10.8)

## 2018-11-13 PROCEDURE — 93970 EXTREMITY STUDY: CPT | Mod: 26

## 2018-11-13 RX ORDER — SEVELAMER CARBONATE 2400 MG/1
400 POWDER, FOR SUSPENSION ORAL
Qty: 0 | Refills: 0 | Status: DISCONTINUED | OUTPATIENT
Start: 2018-11-13 | End: 2018-11-13

## 2018-11-13 RX ORDER — SEVELAMER CARBONATE 2400 MG/1
800 POWDER, FOR SUSPENSION ORAL
Qty: 0 | Refills: 0 | Status: DISCONTINUED | OUTPATIENT
Start: 2018-11-13 | End: 2018-11-20

## 2018-11-13 RX ORDER — IOHEXOL 300 MG/ML
30 INJECTION, SOLUTION INTRAVENOUS ONCE
Qty: 0 | Refills: 0 | Status: COMPLETED | OUTPATIENT
Start: 2018-11-13 | End: 2018-11-13

## 2018-11-13 RX ORDER — OXYCODONE AND ACETAMINOPHEN 5; 325 MG/1; MG/1
1 TABLET ORAL EVERY 6 HOURS
Qty: 0 | Refills: 0 | Status: DISCONTINUED | OUTPATIENT
Start: 2018-11-13 | End: 2018-11-14

## 2018-11-13 RX ADMIN — Medication 40 MILLIGRAM(S): at 06:28

## 2018-11-13 RX ADMIN — LATANOPROST 1 DROP(S): 0.05 SOLUTION/ DROPS OPHTHALMIC; TOPICAL at 21:26

## 2018-11-13 RX ADMIN — Medication 60 MILLIGRAM(S): at 12:23

## 2018-11-13 RX ADMIN — Medication 50 MILLIGRAM(S): at 18:08

## 2018-11-13 RX ADMIN — IOHEXOL 30 MILLILITER(S): 300 INJECTION, SOLUTION INTRAVENOUS at 15:00

## 2018-11-13 RX ADMIN — LIDOCAINE 1 PATCH: 4 CREAM TOPICAL at 21:26

## 2018-11-13 RX ADMIN — OXYCODONE AND ACETAMINOPHEN 1 TABLET(S): 5; 325 TABLET ORAL at 21:37

## 2018-11-13 RX ADMIN — Medication 1 MILLIGRAM(S): at 12:24

## 2018-11-13 RX ADMIN — Medication 50 MILLIGRAM(S): at 06:28

## 2018-11-13 RX ADMIN — SIMETHICONE 80 MILLIGRAM(S): 80 TABLET, CHEWABLE ORAL at 15:04

## 2018-11-13 RX ADMIN — HEPARIN SODIUM 5000 UNIT(S): 5000 INJECTION INTRAVENOUS; SUBCUTANEOUS at 15:04

## 2018-11-13 RX ADMIN — HEPARIN SODIUM 5000 UNIT(S): 5000 INJECTION INTRAVENOUS; SUBCUTANEOUS at 21:26

## 2018-11-13 RX ADMIN — Medication 325 MILLIGRAM(S): at 12:25

## 2018-11-13 RX ADMIN — Medication 100 MILLIGRAM(S): at 15:06

## 2018-11-13 RX ADMIN — LIDOCAINE 1 PATCH: 4 CREAM TOPICAL at 00:55

## 2018-11-13 RX ADMIN — CALCITRIOL 0.25 MICROGRAM(S): 0.5 CAPSULE ORAL at 12:24

## 2018-11-13 RX ADMIN — LIDOCAINE 1 PATCH: 4 CREAM TOPICAL at 12:26

## 2018-11-13 RX ADMIN — Medication 200 MILLIGRAM(S): at 12:28

## 2018-11-13 RX ADMIN — Medication 650 MILLIGRAM(S): at 06:26

## 2018-11-13 RX ADMIN — Medication 100 MILLIGRAM(S): at 21:26

## 2018-11-13 RX ADMIN — SIMETHICONE 80 MILLIGRAM(S): 80 TABLET, CHEWABLE ORAL at 06:28

## 2018-11-13 RX ADMIN — PANTOPRAZOLE SODIUM 40 MILLIGRAM(S): 20 TABLET, DELAYED RELEASE ORAL at 06:28

## 2018-11-13 RX ADMIN — SIMETHICONE 80 MILLIGRAM(S): 80 TABLET, CHEWABLE ORAL at 21:26

## 2018-11-13 RX ADMIN — ATORVASTATIN CALCIUM 40 MILLIGRAM(S): 80 TABLET, FILM COATED ORAL at 21:25

## 2018-11-13 RX ADMIN — HEPARIN SODIUM 5000 UNIT(S): 5000 INJECTION INTRAVENOUS; SUBCUTANEOUS at 06:28

## 2018-11-13 RX ADMIN — Medication 100 MILLIGRAM(S): at 06:26

## 2018-11-13 RX ADMIN — SODIUM CHLORIDE 30 MILLILITER(S): 9 INJECTION INTRAMUSCULAR; INTRAVENOUS; SUBCUTANEOUS at 06:39

## 2018-11-13 NOTE — PROGRESS NOTE ADULT - SUBJECTIVE AND OBJECTIVE BOX
Progress Note: General Surgery  Patient: RACHEL SUMMERS , 83y (1935)Female   MRN: [id number]  Location: [place] [address] A  Visit: 11-04-18 Inpatient  Date: 11-13-18 @ 05:16    Procedure/Diagnosis: acute diverticulitis    Events/ 24h: No acute events overnight. Pain controlled.    Vitals: T(F): 99.8 (11-13-18 @ 00:23), Max: 99.9 (11-12-18 @ 12:42)  HR: 67 (11-13-18 @ 00:23)  BP: 138/60 (11-13-18 @ 00:23) (125/58 - 152/59)  RR: 18 (11-13-18 @ 00:23)  SpO2: --    In:   11-11-18 @ 07:01  -  11-12-18 @ 07:00  --------------------------------------------------------  IN: 540 mL      Out:   11-11-18 @ 07:01  -  11-12-18 @ 07:00  --------------------------------------------------------  OUT:    Voided: 600 mL  Total OUT: 600 mL        Net:   11-11-18 @ 07:01  -  11-12-18 @ 07:00  --------------------------------------------------------  NET: -60 mL        Diet: Diet, NPO:   Except Medications (11-11-18 @ 09:05)    IV Fluids: calcitriol   Capsule 0.25 MICROGram(s) Oral daily  ferrous    sulfate 325 milliGRAM(s) Oral daily  folic acid 1 milliGRAM(s) Oral daily  sodium chloride 0.9%. 1000 milliLiter(s) (30 mL/Hr) IV Continuous <Continuous>      Physical Examination:  General Appearance: NAD  HEENT: EOMI, sclera non-icteric.  Heart: RRR   Lungs: CTABL.   Abdomen:  Soft, nontender, nondistended. No rigidity, guarding, or rebound tenderness.   MSK/Extremities: Warm & well-perfused. Peripheral pulses intact.  Skin: Warm, dry. No jaundice.       Medications: [Standing]  atorvastatin 40 milliGRAM(s) Oral at bedtime  calcitriol   Capsule 0.25 MICROGram(s) Oral daily  ciprofloxacin   IVPB      ciprofloxacin   IVPB 400 milliGRAM(s) IV Intermittent daily  ferrous    sulfate 325 milliGRAM(s) Oral daily  folic acid 1 milliGRAM(s) Oral daily  furosemide    Tablet 40 milliGRAM(s) Oral daily  heparin  Injectable 5000 Unit(s) SubCutaneous every 8 hours  influenza   Vaccine 0.5 milliLiter(s) IntraMuscular once  insulin regular  human recombinant. 10 Unit(s) IV Push once  latanoprost 0.005% Ophthalmic Solution 1 Drop(s) Both EYES at bedtime  lidocaine   Patch 1 Patch Transdermal daily  metoprolol tartrate 50 milliGRAM(s) Oral two times a day  metroNIDAZOLE  IVPB 500 milliGRAM(s) IV Intermittent every 8 hours  metroNIDAZOLE  IVPB      pantoprazole    Tablet 40 milliGRAM(s) Oral before breakfast  simethicone 80 milliGRAM(s) Chew three times a day  sodium chloride 0.9%. 1000 milliLiter(s) (30 mL/Hr) IV Continuous <Continuous>    DVT Prophylaxis: heparin  Injectable 5000 Unit(s) SubCutaneous every 8 hours    GI Prophylaxis: pantoprazole    Tablet 40 milliGRAM(s) Oral before breakfast    Antibiotics: ciprofloxacin   IVPB      ciprofloxacin   IVPB 400 milliGRAM(s) IV Intermittent daily  metroNIDAZOLE  IVPB 500 milliGRAM(s) IV Intermittent every 8 hours  metroNIDAZOLE  IVPB        Anticoagulation:   Medications:[PRN]  acetaminophen   Tablet .. 650 milliGRAM(s) Oral every 6 hours PRN      Labs:                        9.3    13.13 )-----------( 227      ( 12 Nov 2018 13:06 )             28.5     11-12    128<L>  |  88<L>  |  50<H>  ----------------------------<  118<H>  4.5   |  23  |  2.7<H>    Ca    8.3<L>      12 Nov 2018 13:06  Phos  4.5     11-12  Mg     1.8     11-12    TPro  5.6<L>  /  Alb  2.9<L>  /  TBili  0.3  /  DBili  x   /  AST  51<H>  /  ALT  34  /  AlkPhos  134<H>  11-12    LIVER FUNCTIONS - ( 12 Nov 2018 13:06 )  Alb: 2.9 g/dL / Pro: 5.6 g/dL / ALK PHOS: 134 U/L / ALT: 34 U/L / AST: 51 U/L / GGT: x             Imaging:     < from: CT Abdomen and Pelvis No Cont (11.10.18 @ 21:20) >  Interval development of sigmoid diverticulitis. No fluid collection or   pneumoperitoneum.    < end of copied text >      Assessment:  83y Female patient admitted w/ acute diverticulitis    Plan:    NPO, IVF  WBC 13  Cipro/flagyl  DVT/GI ppx  OOBAT  IS  Pain control    Date/Time: 11-13-18 @ 05:16

## 2018-11-13 NOTE — PROGRESS NOTE ADULT - SUBJECTIVE AND OBJECTIVE BOX
The patient continues to have LLQ pain . No SOB     PHYSICAL EXAM:  Vital Signs Last 24 Hrs  T(C): 36.1 (2018 07:33), Max: 37.7 (2018 12:42)  T(F): 96.9 (2018 07:33), Max: 99.9 (2018 12:42)  HR: 63 (2018 07:33) (63 - 69)  BP: 149/67 (2018 07:33) (138/60 - 152/59)  BP(mean): --  RR: 18 (2018 07:33) (18 - 18)  SpO2: --  Daily Height in cm: 160 (2018 10:21)    Daily Weight in k.2 (2018 05:58)  I&O's Detail    2018 07:01  -  2018 07:00  --------------------------------------------------------  IN:  Total IN: 0 mL    OUT:    Voided: 300 mL  Total OUT: 300 mL    Total NET: -300 mL        GENERAL: NAD, well-groomed, well-developed  NECK: Supple, No JVD, Normal thyroid  NERVOUS SYSTEM:  Alert & Oriented X3, Good concentration; Motor Strength 5/5 B/L upper and lower extremities; DTRs 2+ intact and symmetric  CHEST/LUNG: Mild scattered rhonci  HEART: Regular rate and rhythm  ABDOMEN: Soft, LLQ tenderness  EXTREMITIES:  2+ Peripheral Pulses, No clubbing, cyanosis, or edema  LYMPH: No lymphadenopathy noted  SKIN: No rashes or lesion        LABS:                        10.0   12.68 )-----------( 276      ( 2018 06:59 )             31.0     11-12    128<L>  |  88<L>  |  50<H>  ----------------------------<  118<H>  4.5   |  23  |  2.7<H>    Ca    8.3<L>      2018 13:06  Phos  4.5     11-12  Mg     1.8     11-12    TPro  5.6<L>  /  Alb  2.9<L>  /  TBili  0.3  /  DBili  x   /  AST  51<H>  /  ALT  34  /  AlkPhos  134<H>  11-12      MEDICATIONS  (STANDING):  atorvastatin 40 milliGRAM(s) Oral at bedtime  calcitriol   Capsule 0.25 MICROGram(s) Oral daily  ciprofloxacin   IVPB      ciprofloxacin   IVPB 400 milliGRAM(s) IV Intermittent daily  ferrous    sulfate 325 milliGRAM(s) Oral daily  folic acid 1 milliGRAM(s) Oral daily  furosemide    Tablet 40 milliGRAM(s) Oral daily  heparin  Injectable 5000 Unit(s) SubCutaneous every 8 hours  influenza   Vaccine 0.5 milliLiter(s) IntraMuscular once  insulin regular  human recombinant. 10 Unit(s) IV Push once  latanoprost 0.005% Ophthalmic Solution 1 Drop(s) Both EYES at bedtime  lidocaine   Patch 1 Patch Transdermal daily  metoprolol tartrate 50 milliGRAM(s) Oral two times a day  metroNIDAZOLE  IVPB 500 milliGRAM(s) IV Intermittent every 8 hours  metroNIDAZOLE  IVPB      pantoprazole    Tablet 40 milliGRAM(s) Oral before breakfast  simethicone 80 milliGRAM(s) Chew three times a day  sodium chloride 0.9%. 1000 milliLiter(s) (30 mL/Hr) IV Continuous <Continuous>    MEDICATIONS  (PRN):  acetaminophen   Tablet .. 650 milliGRAM(s) Oral every 6 hours PRN Temp greater or equal to 38C (100.4F), Mild Pain (1 - 3)

## 2018-11-13 NOTE — PROGRESS NOTE ADULT - ASSESSMENT
·	Atypical chest pain unlikely secondary to cardiac etiology . It is reproducible and most likely related to musculoskeletal etiology CE are essentially negative . She now has normal LV systolic function.   ·	Cough productive of yellow sputum , intersitial opacity in RLL . Rx as per pulmonary . She has also had PE in the past .This has improved    ·	CKD , needs a degree of prerenal azotemia to maintain out of HF .   ·	diastolic heart failure stable .   ·	Diverticulitis on antibiotics . Being treated conservatively. The patient is an intermediate to high cardiac risk for surgery .    ·	History of PE    Plan::  Antibiotics as per PCP GI and surgery   Continue Metoprolol . If surgery is needed will need to be continued perioperatively.   Surgery should be done only if benefits outweigh risks    Please have EP interrogate ICD CRT   The patient has had a PE in the past Venous doppler was negative but needs DVT prophylaxis   Monitor renal function closely,. She needs a degree of prerenal azotemia to maintain out of HF .

## 2018-11-13 NOTE — PROVIDER CONTACT NOTE (MEDICATION) - SITUATION
Renegel 400mg prescribed, Hospital only has 800mg, not ot be crushed or broke, confirmed with satellite pharmacist and main pharmacy. MD Bolden made aware, awaiting change of dose.

## 2018-11-13 NOTE — PROGRESS NOTE ADULT - SUBJECTIVE AND OBJECTIVE BOX
LENGTH OF HOSPITAL STAY: 9d    CHIEF COMPLAINT:   Patient is a 83y old  Female who presents with a chief complaint of chest pain and SOB (13 Nov 2018 08:07)      HISTORY OF PRESENTING ILLNESS:    HPI:  83 yr old female with pmhx of HTN, CKD 4, recent DVT/PE (thought provoked by travel, completed 6mo a/c and no longer on Eliquis) CHF s/p PPM presented to ER for cough and chest pain. As per patient, she developed non productive cough for the past 2 days with no fevers. Today morning 3am she woke up with pain in the L flank and b/l knees that is 6-8/10 intermittent and she also had one episode of chest tightness centrally located, non radiating which resolved on its own. She denies any fever, chills, headache, urinary or bowel issues. No recent travel or sick contacts. (04 Nov 2018 19:50)    Patient still c/o of LLQ pain.  Has been NPO.  Has left Knee pain given IV steroids but has gotten better.    PAST MEDICAL & SURGICAL HISTORY  PAST MEDICAL & SURGICAL HISTORY:  Hypertension  Pacemaker  Chronic kidney disease  Artificial pacemaker    SOCIAL HISTORY:    ALLERGIES:  Keflex (Unknown)    MEDICATIONS:  STANDING MEDICATIONS  atorvastatin 40 milliGRAM(s) Oral at bedtime  calcitriol   Capsule 0.25 MICROGram(s) Oral daily  ciprofloxacin   IVPB      ciprofloxacin   IVPB 400 milliGRAM(s) IV Intermittent daily  ferrous    sulfate 325 milliGRAM(s) Oral daily  folic acid 1 milliGRAM(s) Oral daily  furosemide    Tablet 40 milliGRAM(s) Oral daily  heparin  Injectable 5000 Unit(s) SubCutaneous every 8 hours  influenza   Vaccine 0.5 milliLiter(s) IntraMuscular once  insulin regular  human recombinant. 10 Unit(s) IV Push once  latanoprost 0.005% Ophthalmic Solution 1 Drop(s) Both EYES at bedtime  lidocaine   Patch 1 Patch Transdermal daily  metoprolol tartrate 50 milliGRAM(s) Oral two times a day  metroNIDAZOLE  IVPB 500 milliGRAM(s) IV Intermittent every 8 hours  metroNIDAZOLE  IVPB      pantoprazole    Tablet 40 milliGRAM(s) Oral before breakfast  simethicone 80 milliGRAM(s) Chew three times a day  sodium chloride 0.9%. 1000 milliLiter(s) IV Continuous <Continuous>    PRN MEDICATIONS  acetaminophen   Tablet .. 650 milliGRAM(s) Oral every 6 hours PRN    VITALS:   T(F): 96.9  HR: 63  BP: 149/67  RR: 18  SpO2: --    LABS:                        10.0   12.68 )-----------( 276      ( 13 Nov 2018 06:59 )             31.0     11-12    128<L>  |  88<L>  |  50<H>  ----------------------------<  118<H>  4.5   |  23  |  2.7<H>    Ca    8.3<L>      12 Nov 2018 13:06  Phos  4.5     11-12  Mg     1.8     11-12    TPro  5.6<L>  /  Alb  2.9<L>  /  TBili  0.3  /  DBili  x   /  AST  51<H>  /  ALT  34  /  AlkPhos  134<H>  11-12          Sedimentation Rate, Erythrocyte: 109 mm/hr <H> (11-12-18 @ 13:06)          RADIOLOGY:    PHYSICAL EXAM:  GEN: No acute distress  HEENT:   LUNGS: Clear to auscultation bilaterally   HEART: S1/S2 present. RRR.   ABD: Soft, non-tender, non-distended. Bowel sounds present  EXT: dec rom with pain and tenderness in the left knee  NEURO: AAOX3

## 2018-11-13 NOTE — PROGRESS NOTE ADULT - SUBJECTIVE AND OBJECTIVE BOX
Hospital Day:  9d    Subjective:  No overnight events. Seen and examined at bedside independently and later with attending Dr. Celeste. Reports that she is still having abdominal pain and knee pain. Upon independent examination she reported feeling better, but with Dr. eCleste in the room she reported that her pain is unchanged. Stated abdominal and knee pain. Stated no fevers, chills, nausea, vomiting, constipation, diarrhea, weakness, fatigue.     Past Medical Hx:   Hypertension  Pacemaker  Chronic kidney disease    Past Sx:  Artificial pacemaker    Allergies:  Keflex (Unknown)    Current Meds:   Standng Meds:  atorvastatin 40 milliGRAM(s) Oral at bedtime  calcitriol   Capsule 0.25 MICROGram(s) Oral daily  ciprofloxacin   IVPB      ciprofloxacin   IVPB 400 milliGRAM(s) IV Intermittent daily  ferrous    sulfate 325 milliGRAM(s) Oral daily  folic acid 1 milliGRAM(s) Oral daily  furosemide    Tablet 40 milliGRAM(s) Oral daily  heparin  Injectable 5000 Unit(s) SubCutaneous every 8 hours  influenza   Vaccine 0.5 milliLiter(s) IntraMuscular once  insulin regular  human recombinant. 10 Unit(s) IV Push once  latanoprost 0.005% Ophthalmic Solution 1 Drop(s) Both EYES at bedtime  lidocaine   Patch 1 Patch Transdermal daily  methylPREDNISolone sodium succinate Injectable 60 milliGRAM(s) IV Push once  metoprolol tartrate 50 milliGRAM(s) Oral two times a day  metroNIDAZOLE  IVPB 500 milliGRAM(s) IV Intermittent every 8 hours  metroNIDAZOLE  IVPB      pantoprazole    Tablet 40 milliGRAM(s) Oral before breakfast  simethicone 80 milliGRAM(s) Chew three times a day  sodium chloride 0.9%. 1000 milliLiter(s) (30 mL/Hr) IV Continuous <Continuous>    PRN Meds:  acetaminophen   Tablet .. 650 milliGRAM(s) Oral every 6 hours PRN Temp greater or equal to 38C (100.4F), Mild Pain (1 - 3)    Vital Signs:   T(F): 96.9 (11-13-18 @ 07:33), Max: 99.9 (11-12-18 @ 12:42)  HR: 63 (11-13-18 @ 07:33) (63 - 69)  BP: 149/67 (11-13-18 @ 07:33) (138/60 - 152/59)  RR: 18 (11-13-18 @ 07:33) (18 - 18)  SpO2: --      11-12-18 @ 07:01  -  11-13-18 @ 07:00  --------------------------------------------------------  IN: 0 mL / OUT: 300 mL / NET: -300 mL    11-13-18 @ 07:01  -  11-13-18 @ 11:46  --------------------------------------------------------  IN: 0 mL / OUT: 0 mL / NET: 0 mL        Physical Exam:   GENERAL: Resting in bed, appearing stated age. In no apparent distress  HEENT: NCAT, PERRLA, EOMI  CHEST/LUNG: CTA, No wheezing, rhonchi, rales  HEART: Regular rate and rhythm; s1 s2 appreciated, No murmurs, rubs, or gallops  ABDOMEN: Bowel sounds present, Mild epigastric and LLQ tenderness; improved from prior day   EXTREMITIES: No LE edema b/l, knees bilaterally warm, swollen, and mildly tender to palpation   NERVOUS SYSTEM:  Alert & Oriented X3, Cranial nerves ii-xii grossly intact    Labs:                         10.0   12.68 )-----------( 276      ( 13 Nov 2018 06:59 )             31.0     Neutophil% 88.4, Lymphocyte% 8.1, Monocyte% 2.3, Bands% 1.0 11-13-18 @ 06:59    13 Nov 2018 06:59    137    |  95     |  59     ----------------------------<  110    4.7     |  21     |  2.9      Ca    9.1        13 Nov 2018 06:59  Phos  5.9       13 Nov 2018 06:59  Mg     1.9       13 Nov 2018 06:59    TPro  6.4    /  Alb  2.9    /  TBili  0.3    /  DBili  x      /  AST  42     /  ALT  31     /  AlkPhos  157    13 Nov 2018 06:59      Serum Pro-Brain Natriuretic Peptide: 7082 pg/mL (11-04-18 @ 11:32)      Iron 18, TIBC 181, %Sat 10 Ferritin -- 11-13-18 @ 06:59    Radiology:   VA Duplex Lower Ext Vein Scan, Bilat (11.13.18 @ 09:11)  Impression:    No evidence of deep venous thrombosis or superficial thrombophlebitis in   bilateral lower extremities.    Bilateral Baker's cysts as above.    Assessment and Plan:   This is an 83 year old female with PMHx of CKD stage 4, Recent DVT/PE provoked by travel and completed a 6month course of Eliquis, CHF s/p PPM who presented to the ED for a cough and chest pain    #Left flank and LLQ pain   - CT 11/10 interval development of sigmoid diverticulitis with no fluid collection or pneumoperitoneum   - Continue on Cipro and Flagyl   - Surgery consulted and following. Requesting medical clearance for possible surgical intervention due to concern for surgical abdomen  - Cardiology consulted, will have EP evaluate to interrogate device.   - Repeat UCx and Blood Culture   - Repeat CT scan ordered today     #Left knee pain  - Most likely gouty arthritis  - Solumedrol 60mg x1 repeated today   - Bilateral Knee x-rays ordered   - PT eval     #Hyperkalemia (resolved)  - Nephrology as an outpatient   - No ACE/ARB  - Monitor K if over 5.5 Insulin and D50 with EKG     #HTN (BP stable)   - Continue on current medications     #MBD  - Check Ph, and iPTH  - Calcium WNL     #Anemia  - Start on FeSO4, Check serum iron studies     #Chest pain  - ICD interrogated and no events   - ACS ruled out   - Continue on metoprolol, Lasix, and Atorvastatin    #Cough   - Unlikely PNA, seen by pulmonary   #HTN  - c/w  lopressor, Lasix    #CKD stage 4  - stable (baseline ~ 2.3 - 2.8)  - c/w calcitriol  - monitor bmp    Diet: Clear liquid   DVT ppx Lovenox 40mg daily   GI ppx:  Pantoprazole   Activity: ambulate as tolerated   Code Status: Full Code   Dispo: Continue to [place]; from home

## 2018-11-13 NOTE — PROGRESS NOTE ADULT - ASSESSMENT
Patient still with LLQ pain which has improved but wbc has increased to 17K.  Patient was seen by Surgery.  Had a repeat noncontrast CT of Abd and Pelvis which showed new developement of acute sigmoid diverticulitis.  Patient on IV antibiotics Cipro and Flagyl. Left knee pain most like due to Gouty Arthritis and will give 2nd dose of Solumedrol  now.     Repeat CT of Abd/pelvis without contrast  Clear liquid diet  Xrays of left Knee  IV Solumedrol  Surgical followup  discussed with Medical Team

## 2018-11-13 NOTE — PROGRESS NOTE ADULT - SUBJECTIVE AND OBJECTIVE BOX
Nephrology progress note    Patient is seen and examined, events over the last 24 h noted .    Allergies:  Keflex (Unknown)    Hospital Medications:   MEDICATIONS  (STANDING):  atorvastatin 40 milliGRAM(s) Oral at bedtime  calcitriol   Capsule 0.25 MICROGram(s) Oral daily  ciprofloxacin   IVPB      ciprofloxacin   IVPB 400 milliGRAM(s) IV Intermittent daily  ferrous    sulfate 325 milliGRAM(s) Oral daily  folic acid 1 milliGRAM(s) Oral daily  furosemide    Tablet 40 milliGRAM(s) Oral daily  heparin  Injectable 5000 Unit(s) SubCutaneous every 8 hours  influenza   Vaccine 0.5 milliLiter(s) IntraMuscular once  insulin regular  human recombinant. 10 Unit(s) IV Push once  latanoprost 0.005% Ophthalmic Solution 1 Drop(s) Both EYES at bedtime  lidocaine   Patch 1 Patch Transdermal daily  metoprolol tartrate 50 milliGRAM(s) Oral two times a day  metroNIDAZOLE  IVPB 500 milliGRAM(s) IV Intermittent every 8 hours  metroNIDAZOLE  IVPB      pantoprazole    Tablet 40 milliGRAM(s) Oral before breakfast  simethicone 80 milliGRAM(s) Chew three times a day  sodium chloride 0.9%. 1000 milliLiter(s) (30 mL/Hr) IV Continuous <Continuous>        VITALS:  T(F): 96.9 (18 @ 07:33), Max: 99.8 (18 @ 00:23)  HR: 63 (18 @ 07:33)  BP: 149/67 (18 @ 07:33)  RR: 18 (18 @ 07:33)  SpO2: --  Wt(kg): --     @ :  -   @ 07:00  --------------------------------------------------------  IN: 540 mL / OUT: 600 mL / NET: -60 mL     @ 07:01  -   @ 07:00  --------------------------------------------------------  IN: 0 mL / OUT: 300 mL / NET: -300 mL     @ 07:01  -   @ 12:42  --------------------------------------------------------  IN: 0 mL / OUT: 0 mL / NET: 0 mL          PHYSICAL EXAM:  Constitutional: NAD  HEENT: anicteric sclera, oropharynx clear, MMM  Neck: No JVD  Respiratory: CTAB, no wheezes, rales or rhonchi  Cardiovascular: S1, S2, RRR  Gastrointestinal: BS+, soft, NT/ND  Extremities: No cyanosis or clubbing. No peripheral edema  :  No carlos.   Skin: No rashes    LABS:      137  |  95<L>  |  59<H>  ----------------------------<  110<H>  4.7   |  21  |  2.9<H>    Ca    9.1      2018 06:59  Phos  5.9       Mg     1.9         TPro  6.4  /  Alb  2.9<L>  /  TBili  0.3  /  DBili      /  AST  42<H>  /  ALT  31  /  AlkPhos  157<H>                            10.0   12.68 )-----------( 276      ( 2018 06:59 )             31.0       Urine Studies:  Urinalysis Basic - ( 2018 22:00 )    Color: Yellow / Appearance: Clear / S.010 / pH:   Gluc:  / Ketone: Negative  / Bili: Negative / Urobili: 0.2   Blood:  / Protein: Negative / Nitrite: Negative   Leuk Esterase: Negative / RBC:  / WBC    Sq Epi:  / Non Sq Epi:  / Bacteria:         RADIOLOGY & ADDITIONAL STUDIES: Nephrology progress note    Patient is seen and examined, events over the last 24 h noted .  denied chest pain no SOB   Noother complaints     Allergies:  Keflex (Unknown)    Hospital Medications:   MEDICATIONS  (STANDING):  atorvastatin 40 milliGRAM(s) Oral at bedtime  calcitriol   Capsule 0.25 MICROGram(s) Oral daily  ciprofloxacin   IVPB      ciprofloxacin   IVPB 400 milliGRAM(s) IV Intermittent daily  ferrous    sulfate 325 milliGRAM(s) Oral daily  folic acid 1 milliGRAM(s) Oral daily  furosemide    Tablet 40 milliGRAM(s) Oral daily  heparin  Injectable 5000 Unit(s) SubCutaneous every 8 hours  influenza   Vaccine 0.5 milliLiter(s) IntraMuscular once  insulin regular  human recombinant. 10 Unit(s) IV Push once  latanoprost 0.005% Ophthalmic Solution 1 Drop(s) Both EYES at bedtime  lidocaine   Patch 1 Patch Transdermal daily  metoprolol tartrate 50 milliGRAM(s) Oral two times a day  metroNIDAZOLE  IVPB 500 milliGRAM(s) IV Intermittent every 8 hours  metroNIDAZOLE  IVPB      pantoprazole    Tablet 40 milliGRAM(s) Oral before breakfast  simethicone 80 milliGRAM(s) Chew three times a day  sodium chloride 0.9%. 1000 milliLiter(s) (30 mL/Hr) IV Continuous <Continuous>        VITALS:  T(F): 96.9 (18 @ 07:33), Max: 99.8 (18 @ 00:23)  HR: 63 (18 @ 07:33)  BP: 149/67 (18 @ 07:33)  RR: 18 (18 @ 07:33)       @ :  -   @ 07:00  --------------------------------------------------------  IN: 540 mL / OUT: 600 mL / NET: -60 mL     @ 07:01  -   @ 07:00  --------------------------------------------------------  IN: 0 mL / OUT: 300 mL / NET: -300 mL     @ 07:01  -   @ 12:42  --------------------------------------------------------  IN: 0 mL / OUT: 0 mL / NET: 0 mL          PHYSICAL EXAM:  Constitutional: NAD  HEENT: anicteric sclera, oropharynx clear, MMM  Neck: No JVD  Respiratory: CTAB, no wheezes, rales or rhonchi  Cardiovascular: S1, S2, RRR  Gastrointestinal: BS+, soft, NT/ND  Extremities: No cyanosis or clubbing. No peripheral edema  :  No carlos.   Skin: No rashes    LABS:      137  |  95<L>  |  59<H>  ----------------------------<  110<H>  4.7   |  21  |  2.9<H>  Creatinine Trend: 2.9<--, 2.7<--, 1.6<--, 2.2<--, 2.2<--, 2.3<--  Ca    9.1      2018 06:59  Phos  5.9       Mg     1.9         TPro  6.4  /  Alb  2.9<L>  /  TBili  0.3  /  DBili      /  AST  42<H>  /  ALT  31  /  AlkPhos  157<H>                            10.0   12.68 )-----------( 276      ( 2018 06:59 )             31.0       Urine Studies:  Urinalysis Basic - ( 2018 22:00 )    Color: Yellow / Appearance: Clear / S.010 / pH:   Gluc:  / Ketone: Negative  / Bili: Negative / Urobili: 0.2   Blood:  / Protein: Negative / Nitrite: Negative   Leuk Esterase: Negative / RBC:  / WBC    Sq Epi:  / Non Sq Epi:  / Bacteria:         RADIOLOGY & ADDITIONAL STUDIES:

## 2018-11-14 LAB
ANION GAP SERPL CALC-SCNC: 19 MMOL/L — HIGH (ref 7–14)
APPEARANCE UR: CLEAR — SIGNIFICANT CHANGE UP
BACTERIA # UR AUTO: ABNORMAL /HPF
BASOPHILS # BLD AUTO: 0.02 K/UL — SIGNIFICANT CHANGE UP (ref 0–0.2)
BASOPHILS NFR BLD AUTO: 0.1 % — SIGNIFICANT CHANGE UP (ref 0–1)
BILIRUB UR-MCNC: NEGATIVE — SIGNIFICANT CHANGE UP
BUN SERPL-MCNC: 67 MG/DL — CRITICAL HIGH (ref 10–20)
CALCIUM SERPL-MCNC: 8.6 MG/DL — SIGNIFICANT CHANGE UP (ref 8.5–10.1)
CHLORIDE SERPL-SCNC: 91 MMOL/L — LOW (ref 98–110)
CO2 SERPL-SCNC: 23 MMOL/L — SIGNIFICANT CHANGE UP (ref 17–32)
COLOR SPEC: YELLOW — SIGNIFICANT CHANGE UP
CREAT SERPL-MCNC: 2.8 MG/DL — HIGH (ref 0.7–1.5)
DIFF PNL FLD: NEGATIVE — SIGNIFICANT CHANGE UP
EOSINOPHIL # BLD AUTO: 0 K/UL — SIGNIFICANT CHANGE UP (ref 0–0.7)
EOSINOPHIL NFR BLD AUTO: 0 % — SIGNIFICANT CHANGE UP (ref 0–8)
EPI CELLS # UR: ABNORMAL /HPF
GLUCOSE SERPL-MCNC: 205 MG/DL — HIGH (ref 70–99)
GLUCOSE UR QL: NEGATIVE MG/DL — SIGNIFICANT CHANGE UP
HCT VFR BLD CALC: 29.1 % — LOW (ref 37–47)
HGB BLD-MCNC: 9.5 G/DL — LOW (ref 12–16)
IMM GRANULOCYTES NFR BLD AUTO: 1 % — HIGH (ref 0.1–0.3)
KETONES UR-MCNC: NEGATIVE — SIGNIFICANT CHANGE UP
LEUKOCYTE ESTERASE UR-ACNC: ABNORMAL
LYMPHOCYTES # BLD AUTO: 1.1 K/UL — LOW (ref 1.2–3.4)
LYMPHOCYTES # BLD AUTO: 5.6 % — LOW (ref 20.5–51.1)
MAGNESIUM SERPL-MCNC: 2 MG/DL — SIGNIFICANT CHANGE UP (ref 1.8–2.4)
MCHC RBC-ENTMCNC: 28.8 PG — SIGNIFICANT CHANGE UP (ref 27–31)
MCHC RBC-ENTMCNC: 32.6 G/DL — SIGNIFICANT CHANGE UP (ref 32–37)
MCV RBC AUTO: 88.2 FL — SIGNIFICANT CHANGE UP (ref 81–99)
MONOCYTES # BLD AUTO: 1.26 K/UL — HIGH (ref 0.1–0.6)
MONOCYTES NFR BLD AUTO: 6.4 % — SIGNIFICANT CHANGE UP (ref 1.7–9.3)
NEUTROPHILS # BLD AUTO: 17.05 K/UL — HIGH (ref 1.4–6.5)
NEUTROPHILS NFR BLD AUTO: 86.9 % — HIGH (ref 42.2–75.2)
NITRITE UR-MCNC: NEGATIVE — SIGNIFICANT CHANGE UP
NRBC # BLD: 0 /100 WBCS — SIGNIFICANT CHANGE UP (ref 0–0)
PH UR: 6 — SIGNIFICANT CHANGE UP (ref 5–8)
PLATELET # BLD AUTO: 305 K/UL — SIGNIFICANT CHANGE UP (ref 130–400)
POTASSIUM SERPL-MCNC: 4.5 MMOL/L — SIGNIFICANT CHANGE UP (ref 3.5–5)
POTASSIUM SERPL-SCNC: 4.5 MMOL/L — SIGNIFICANT CHANGE UP (ref 3.5–5)
PROT UR-MCNC: 100 MG/DL
RBC # BLD: 3.3 M/UL — LOW (ref 4.2–5.4)
RBC # FLD: 13 % — SIGNIFICANT CHANGE UP (ref 11.5–14.5)
SODIUM SERPL-SCNC: 133 MMOL/L — LOW (ref 135–146)
SP GR SPEC: 1.01 — SIGNIFICANT CHANGE UP (ref 1.01–1.03)
UROBILINOGEN FLD QL: 0.2 MG/DL — SIGNIFICANT CHANGE UP (ref 0.2–0.2)
WBC # BLD: 19.63 K/UL — HIGH (ref 4.8–10.8)
WBC # FLD AUTO: 19.63 K/UL — HIGH (ref 4.8–10.8)

## 2018-11-14 RX ORDER — SODIUM CHLORIDE 9 MG/ML
1000 INJECTION, SOLUTION INTRAVENOUS
Qty: 0 | Refills: 0 | Status: DISCONTINUED | OUTPATIENT
Start: 2018-11-14 | End: 2018-11-20

## 2018-11-14 RX ORDER — LACTULOSE 10 G/15ML
10 SOLUTION ORAL
Qty: 0 | Refills: 0 | Status: DISCONTINUED | OUTPATIENT
Start: 2018-11-14 | End: 2018-11-14

## 2018-11-14 RX ORDER — LACTULOSE 10 G/15ML
10 SOLUTION ORAL
Qty: 0 | Refills: 0 | Status: COMPLETED | OUTPATIENT
Start: 2018-11-14 | End: 2018-11-14

## 2018-11-14 RX ADMIN — LACTULOSE 10 GRAM(S): 10 SOLUTION ORAL at 05:57

## 2018-11-14 RX ADMIN — SEVELAMER CARBONATE 800 MILLIGRAM(S): 2400 POWDER, FOR SUSPENSION ORAL at 08:44

## 2018-11-14 RX ADMIN — HEPARIN SODIUM 5000 UNIT(S): 5000 INJECTION INTRAVENOUS; SUBCUTANEOUS at 13:21

## 2018-11-14 RX ADMIN — Medication 50 MILLIGRAM(S): at 05:56

## 2018-11-14 RX ADMIN — LACTULOSE 10 GRAM(S): 10 SOLUTION ORAL at 02:14

## 2018-11-14 RX ADMIN — Medication 650 MILLIGRAM(S): at 21:37

## 2018-11-14 RX ADMIN — Medication 50 MILLIGRAM(S): at 17:01

## 2018-11-14 RX ADMIN — Medication 325 MILLIGRAM(S): at 11:17

## 2018-11-14 RX ADMIN — SIMETHICONE 80 MILLIGRAM(S): 80 TABLET, CHEWABLE ORAL at 13:24

## 2018-11-14 RX ADMIN — CALCITRIOL 0.25 MICROGRAM(S): 0.5 CAPSULE ORAL at 11:16

## 2018-11-14 RX ADMIN — SIMETHICONE 80 MILLIGRAM(S): 80 TABLET, CHEWABLE ORAL at 21:37

## 2018-11-14 RX ADMIN — ATORVASTATIN CALCIUM 40 MILLIGRAM(S): 80 TABLET, FILM COATED ORAL at 21:37

## 2018-11-14 RX ADMIN — Medication 100 MILLIGRAM(S): at 21:36

## 2018-11-14 RX ADMIN — Medication 100 MILLIGRAM(S): at 13:20

## 2018-11-14 RX ADMIN — OXYCODONE AND ACETAMINOPHEN 1 TABLET(S): 5; 325 TABLET ORAL at 01:11

## 2018-11-14 RX ADMIN — SIMETHICONE 80 MILLIGRAM(S): 80 TABLET, CHEWABLE ORAL at 05:56

## 2018-11-14 RX ADMIN — HEPARIN SODIUM 5000 UNIT(S): 5000 INJECTION INTRAVENOUS; SUBCUTANEOUS at 21:37

## 2018-11-14 RX ADMIN — SODIUM CHLORIDE 30 MILLILITER(S): 9 INJECTION, SOLUTION INTRAVENOUS at 07:34

## 2018-11-14 RX ADMIN — LIDOCAINE 1 PATCH: 4 CREAM TOPICAL at 11:17

## 2018-11-14 RX ADMIN — LATANOPROST 1 DROP(S): 0.05 SOLUTION/ DROPS OPHTHALMIC; TOPICAL at 21:37

## 2018-11-14 RX ADMIN — Medication 1 MILLIGRAM(S): at 11:16

## 2018-11-14 RX ADMIN — SEVELAMER CARBONATE 800 MILLIGRAM(S): 2400 POWDER, FOR SUSPENSION ORAL at 17:01

## 2018-11-14 RX ADMIN — PANTOPRAZOLE SODIUM 40 MILLIGRAM(S): 20 TABLET, DELAYED RELEASE ORAL at 08:44

## 2018-11-14 RX ADMIN — SEVELAMER CARBONATE 800 MILLIGRAM(S): 2400 POWDER, FOR SUSPENSION ORAL at 12:47

## 2018-11-14 RX ADMIN — Medication 200 MILLIGRAM(S): at 11:16

## 2018-11-14 RX ADMIN — HEPARIN SODIUM 5000 UNIT(S): 5000 INJECTION INTRAVENOUS; SUBCUTANEOUS at 05:57

## 2018-11-14 RX ADMIN — Medication 40 MILLIGRAM(S): at 05:56

## 2018-11-14 RX ADMIN — Medication 100 MILLIGRAM(S): at 05:55

## 2018-11-14 NOTE — PROGRESS NOTE ADULT - ASSESSMENT
Patient still with abdominal pain but lessened.  Had some watery bowel movements.  Has difficulty emptying bladder.  States Left Knee pain is slightly better    Patient had a CT of Abd and pelvis Results are Pending    No change in IV antibiotics    Increased wbc due to steroid affect    Continue current tx    Morgan Catheter and resend urine    If bowel movements continue to be watery will send C dif cultures    Discussed with medical resident

## 2018-11-14 NOTE — PROGRESS NOTE ADULT - ASSESSMENT
Patient with CKD 4 (Baseline Cr. ~ 1.95 - 2.2) presented to hospital with chest pain and dry cough, LLQ pain, b/l knee pain, left flank pain, dysuria, urinary frequency  PMH HTN, CKD 4 (baseline cr. ~ 1.95 - 2.2), PPM.    # CKD 4/Hyperkalemia   - S. Cr. on the higher side 2.8  - no fluid overload, hold LAsix, cont maintenance IVF NS 50 cc/hr   - low K diet   - Monitor I & O, check phos please on renagel  - repeat CXR assess PVC seen last week    # Sepsis - diverticulitis unchanged as per CT last night  WBC up to 19x, due to steroids?  monitor, on Flagyl and Cipro    # HTN   - BP at goal   - cont. current meds    # Anemia Hb stable     #Gouty arthritis - off steroids    D/W HS

## 2018-11-14 NOTE — PROVIDER CONTACT NOTE (OTHER) - ACTION/TREATMENT ORDERED:
As per Dr. Yang, Morgan catheter is not necessary at this time. Will continue to monitor.
Spoke with Dr. Yang, as per MD place patient back in bed, Dr. Yang does not want to order Zofran at this time. Will continue to monitor.

## 2018-11-14 NOTE — PROGRESS NOTE ADULT - SUBJECTIVE AND OBJECTIVE BOX
Hospital Day:  10d    Subjective:  Overnight patient complained of some abdominal pain and difficulty and pain on urination. Seen and examined at bedside. Reported abdominal pain is consistent. Her knee pain has improved a bit and states it has been getting better. She stated that she is having pain on urination and difficulty with urinating. No chest pain, fevers, chills, nausea, vomiting, constipation. Stated one episode of diarrhea.     Past Medical Hx:   Hypertension  Pacemaker  Chronic kidney disease    Past Sx:  Artificial pacemaker    Allergies:  Keflex (Unknown)    Current Meds:   Standng Meds:  atorvastatin 40 milliGRAM(s) Oral at bedtime  calcitriol   Capsule 0.25 MICROGram(s) Oral daily  ciprofloxacin   IVPB      ciprofloxacin   IVPB 400 milliGRAM(s) IV Intermittent daily  dextrose 5% + sodium chloride 0.9%. 1000 milliLiter(s) (30 mL/Hr) IV Continuous <Continuous>  ferrous    sulfate 325 milliGRAM(s) Oral daily  folic acid 1 milliGRAM(s) Oral daily  furosemide    Tablet 40 milliGRAM(s) Oral daily  heparin  Injectable 5000 Unit(s) SubCutaneous every 8 hours  influenza   Vaccine 0.5 milliLiter(s) IntraMuscular once  insulin regular  human recombinant. 10 Unit(s) IV Push once  latanoprost 0.005% Ophthalmic Solution 1 Drop(s) Both EYES at bedtime  lidocaine   Patch 1 Patch Transdermal daily  metoprolol tartrate 50 milliGRAM(s) Oral two times a day  metroNIDAZOLE  IVPB 500 milliGRAM(s) IV Intermittent every 8 hours  metroNIDAZOLE  IVPB      pantoprazole    Tablet 40 milliGRAM(s) Oral before breakfast  sevelamer hydrochloride 800 milliGRAM(s) Oral three times a day with meals  simethicone 80 milliGRAM(s) Chew three times a day    PRN Meds:  acetaminophen   Tablet .. 650 milliGRAM(s) Oral every 6 hours PRN Temp greater or equal to 38C (100.4F), Mild Pain (1 - 3)    Vital Signs:   T(F): 96.3 (11-14-18 @ 07:29), Max: 97.2 (11-14-18 @ 00:00)  HR: 60 (11-14-18 @ 07:29) (60 - 60)  BP: 149/72 (11-14-18 @ 07:29) (114/56 - 149/72)  RR: 18 (11-14-18 @ 07:29) (16 - 18)  SpO2: --      11-13-18 @ 07:01  -  11-14-18 @ 07:00  --------------------------------------------------------  IN: 0 mL / OUT: 300 mL / NET: -300 mL    11-14-18 @ 07:01  -  11-14-18 @ 11:38  --------------------------------------------------------  IN: 0 mL / OUT: 200 mL / NET: -200 mL        Physical Exam:   GENERAL: Resting in bed, appearing stated age. In mild painful distress  HEENT: NCAT, PERRLA, EOMI  CHEST/LUNG: CTA, No wheezing, rhonchi, rales  HEART: Regular rate and rhythm; s1 s2 appreciated, No murmurs, rubs, or gallops  ABDOMEN: Bowel sounds present, Mild epigastric and LLQ tenderness  EXTREMITIES: No LE edema b/l, knees bilaterally warm, swollen, and mildly tender to palpation   NERVOUS SYSTEM:  Alert & Oriented X3, Cranial nerves ii-xii grossly intact      Labs:                         9.5    19.63 )-----------( 305      ( 14 Nov 2018 05:56 )             29.1     Neutophil% 86.9, Lymphocyte% 5.6, Monocyte% 6.4, Bands% 1.0 11-14-18 @ 05:56    14 Nov 2018 05:56    133    |  91     |  67     ----------------------------<  205    4.5     |  23     |  2.8      Ca    8.6        14 Nov 2018 05:56  Phos  5.9       13 Nov 2018 06:59  Mg     2.0       14 Nov 2018 05:56    TPro  6.4    /  Alb  2.9    /  TBili  0.3    /  DBili  x      /  AST  42     /  ALT  31     /  AlkPhos  157    13 Nov 2018 06:59      Iron 18, TIBC 181, %Sat 10 Ferritin 476 11-13-18 @ 06:59    Radiology:   CT A/P 11/13/2018: Read pending  Xray Knee 3 Views, Bilateral (11.13.18 @ 18:43)  IMPRESSION:    No acute fracture or dislocation.  Bilateral severe tricompartmental degenerative changes.  Small bilateral knee joint effusions.      Assessment and Plan:   This is an 83 year old female with PMHx of CKD stage 4, Recent DVT/PE provoked by travel and completed a 6month course of Eliquis, CHF s/p PPM who presented to the ED for a cough and chest pain    #Left flank and LLQ pain   - CT 11/10 interval development of sigmoid diverticulitis with no fluid collection or pneumoperitoneum   - Continue on Cipro and Flagyl   - Surgery consulted and following. Requesting medical clearance for possible surgical intervention due to concern for surgical abdomen  - Cardiology consulted, will have EP evaluate to interrogate device.   - Repeat UCx and Blood Culture   - Repeat CT scan done 11/13/2018; read pending    #Left knee pain  - Most likely gouty arthritis  - Solumedrol 60mg x1 repeated today   - Bilateral Knee x-rays ordered, no acute fracture. Degenerative changes    - PT eval     #Hyperkalemia (resolved)  - Nephrology as an outpatient   - No ACE/ARB  - Monitor K if over 5.5 Insulin and D50 with EKG     #HTN (BP stable)   - Continue on current medications     #MBD  - Check Ph, and iPTH  - Calcium WNL     #Anemia  - Start on FeSO4, Check serum iron studies     #Chest pain  - ICD interrogated and no events   - ACS ruled out   - Continue on metoprolol, Lasix, and Atorvastatin    #Cough   - Unlikely PNA, seen by pulmonary     #HTN  - c/w  lopressor, Lasix    #CKD stage 4  - stable (baseline ~ 2.3 - 2.8)  - c/w calcitriol  - monitor bmp    #Urine Retention  - Complains of difficulty and pain when emptying bladder  - Morgan placed, UA and urine culture    #Loose watery Bowel Movement  - Will continue to monitor. If over 3 in 24 hours will order a C. Diff PCR     Diet: Clear liquid   DVT ppx Lovenox 40mg daily   GI ppx:  Pantoprazole   Activity: ambulate as tolerated   Code Status: Full Code   Dispo: Continue to monitor; from home

## 2018-11-14 NOTE — PROGRESS NOTE ADULT - SUBJECTIVE AND OBJECTIVE BOX
LENGTH OF HOSPITAL STAY: 10d    CHIEF COMPLAINT:   Patient is a 83y old  Female who presents with a chief complaint of chest pain and SOB (14 Nov 2018 04:54)      HISTORY OF PRESENTING ILLNESS:    HPI:  83 yr old female with pmhx of HTN, CKD 4, recent DVT/PE (thought provoked by travel, completed 6mo a/c and no longer on Eliquis) CHF s/p PPM presented to ER for cough and chest pain. As per patient, she developed non productive cough for the past 2 days with no fevers. Today morning 3am she woke up with pain in the L flank and b/l knees that is 6-8/10 intermittent and she also had one episode of chest tightness centrally located, non radiating which resolved on its own. She denies any fever, chills, headache, urinary or bowel issues. No recent travel or sick contacts. (04 Nov 2018 19:50)    Patient still with abdominal pain but lessened.  Had some watery bowel movements.  Has difficulty emptying bladder.  States Left Knee pain is slightly better    PAST MEDICAL & SURGICAL HISTORY  PAST MEDICAL & SURGICAL HISTORY:  Hypertension  Pacemaker  Chronic kidney disease  Artificial pacemaker    SOCIAL HISTORY:    ALLERGIES:  Keflex (Unknown)    MEDICATIONS:  STANDING MEDICATIONS  atorvastatin 40 milliGRAM(s) Oral at bedtime  calcitriol   Capsule 0.25 MICROGram(s) Oral daily  ciprofloxacin   IVPB      ciprofloxacin   IVPB 400 milliGRAM(s) IV Intermittent daily  dextrose 5% + sodium chloride 0.9%. 1000 milliLiter(s) IV Continuous <Continuous>  ferrous    sulfate 325 milliGRAM(s) Oral daily  folic acid 1 milliGRAM(s) Oral daily  furosemide    Tablet 40 milliGRAM(s) Oral daily  heparin  Injectable 5000 Unit(s) SubCutaneous every 8 hours  influenza   Vaccine 0.5 milliLiter(s) IntraMuscular once  insulin regular  human recombinant. 10 Unit(s) IV Push once  latanoprost 0.005% Ophthalmic Solution 1 Drop(s) Both EYES at bedtime  lidocaine   Patch 1 Patch Transdermal daily  metoprolol tartrate 50 milliGRAM(s) Oral two times a day  metroNIDAZOLE  IVPB 500 milliGRAM(s) IV Intermittent every 8 hours  metroNIDAZOLE  IVPB      pantoprazole    Tablet 40 milliGRAM(s) Oral before breakfast  sevelamer hydrochloride 800 milliGRAM(s) Oral three times a day with meals  simethicone 80 milliGRAM(s) Chew three times a day    PRN MEDICATIONS  acetaminophen   Tablet .. 650 milliGRAM(s) Oral every 6 hours PRN    VITALS:   T(F): 96.3  HR: 60  BP: 149/72  RR: 18  SpO2: --    LABS:                        9.5    19.63 )-----------( 305      ( 14 Nov 2018 05:56 )             29.1     11-13    137  |  95<L>  |  59<H>  ----------------------------<  110<H>  4.7   |  21  |  2.9<H>    Ca    9.1      13 Nov 2018 06:59  Phos  5.9     11-13  Mg     2.0     11-14    TPro  6.4  /  Alb  2.9<L>  /  TBili  0.3  /  DBili  x   /  AST  42<H>  /  ALT  31  /  AlkPhos  157<H>  11-13                  RADIOLOGY:    PHYSICAL EXAM:  GEN: No acute distress  HEENT:   LUNGS: Clear to auscultation bilaterally   HEART: S1/S2 present. RRR.   ABD: Soft, non-tender, non-distended. Bowel sounds present  EXT: left Knee with pain on flex but improved from yesterday  NEURO: AAOX3

## 2018-11-14 NOTE — PROGRESS NOTE ADULT - SUBJECTIVE AND OBJECTIVE BOX
Progress Note: General Surgery  Patient: RACHEL SUMMERS , 83y (1935)Female   MRN: [id number]  Location: [address]  Visit: 11-04-18 Inpatient  Date: 11-14-18 @ 04:55    Procedure/Diagnosis: acute diverticulitis    Events/ 24h: Some nausea yesterday. Otherwise, no acute events overnight. Pain controlled.    Vitals: T(F): 97.2 (11-14-18 @ 00:00), Max: 97.2 (11-14-18 @ 00:00)  HR: 60 (11-14-18 @ 00:00)  BP: 136/66 (11-14-18 @ 00:00) (114/56 - 149/67)  RR: 16 (11-14-18 @ 00:00)  SpO2: --    In:   11-12-18 @ 07:01  -  11-13-18 @ 07:00  --------------------------------------------------------  IN: 0 mL    11-13-18 @ 07:01  -  11-14-18 @ 04:55  --------------------------------------------------------  IN: 0 mL      Out:   11-12-18 @ 07:01  -  11-13-18 @ 07:00  --------------------------------------------------------  OUT:    Voided: 300 mL  Total OUT: 300 mL      11-13-18 @ 07:01  -  11-14-18 @ 04:55  --------------------------------------------------------  OUT:    Voided: 300 mL  Total OUT: 300 mL        Net:   11-12-18 @ 07:01  -  11-13-18 @ 07:00  --------------------------------------------------------  NET: -300 mL    11-13-18 @ 07:01  -  11-14-18 @ 04:55  --------------------------------------------------------  NET: -300 mL        Diet: Diet, Clear Liquid:   No Red Dye (11-13-18 @ 11:43)    IV Fluids: calcitriol   Capsule 0.25 MICROGram(s) Oral daily  ferrous    sulfate 325 milliGRAM(s) Oral daily  folic acid 1 milliGRAM(s) Oral daily  sodium chloride 0.9%. 1000 milliLiter(s) (30 mL/Hr) IV Continuous <Continuous>      Physical Examination:  General Appearance: NAD  HEENT: EOMI, sclera non-icteric.  Heart: RRR   Lungs: CTABL.   Abdomen:  Soft, nontender, nondistended. No rigidity, guarding, or rebound tenderness.   MSK/Extremities: Warm & well-perfused. Peripheral pulses intact.  Skin: Warm, dry. No jaundice.       Medications: [Standing]  atorvastatin 40 milliGRAM(s) Oral at bedtime  calcitriol   Capsule 0.25 MICROGram(s) Oral daily  ciprofloxacin   IVPB      ciprofloxacin   IVPB 400 milliGRAM(s) IV Intermittent daily  ferrous    sulfate 325 milliGRAM(s) Oral daily  folic acid 1 milliGRAM(s) Oral daily  furosemide    Tablet 40 milliGRAM(s) Oral daily  heparin  Injectable 5000 Unit(s) SubCutaneous every 8 hours  influenza   Vaccine 0.5 milliLiter(s) IntraMuscular once  insulin regular  human recombinant. 10 Unit(s) IV Push once  lactulose Syrup 10 Gram(s) Oral two times a day  latanoprost 0.005% Ophthalmic Solution 1 Drop(s) Both EYES at bedtime  lidocaine   Patch 1 Patch Transdermal daily  metoprolol tartrate 50 milliGRAM(s) Oral two times a day  metroNIDAZOLE  IVPB 500 milliGRAM(s) IV Intermittent every 8 hours  metroNIDAZOLE  IVPB      pantoprazole    Tablet 40 milliGRAM(s) Oral before breakfast  sevelamer hydrochloride 800 milliGRAM(s) Oral three times a day with meals  simethicone 80 milliGRAM(s) Chew three times a day  sodium chloride 0.9%. 1000 milliLiter(s) (30 mL/Hr) IV Continuous <Continuous>    DVT Prophylaxis: heparin  Injectable 5000 Unit(s) SubCutaneous every 8 hours    GI Prophylaxis: pantoprazole    Tablet 40 milliGRAM(s) Oral before breakfast    Antibiotics: ciprofloxacin   IVPB      ciprofloxacin   IVPB 400 milliGRAM(s) IV Intermittent daily  metroNIDAZOLE  IVPB 500 milliGRAM(s) IV Intermittent every 8 hours  metroNIDAZOLE  IVPB        Anticoagulation:   Medications:[PRN]  acetaminophen   Tablet .. 650 milliGRAM(s) Oral every 6 hours PRN      Labs:                        10.0   12.68 )-----------( 276      ( 13 Nov 2018 06:59 )             31.0     11-13    137  |  95<L>  |  59<H>  ----------------------------<  110<H>  4.7   |  21  |  2.9<H>    Ca    9.1      13 Nov 2018 06:59  Phos  5.9     11-13  Mg     1.9     11-13    TPro  6.4  /  Alb  2.9<L>  /  TBili  0.3  /  DBili  x   /  AST  42<H>  /  ALT  31  /  AlkPhos  157<H>  11-13    LIVER FUNCTIONS - ( 13 Nov 2018 06:59 )  Alb: 2.9 g/dL / Pro: 6.4 g/dL / ALK PHOS: 157 U/L / ALT: 31 U/L / AST: 42 U/L / GGT: x               Imaging:     < from: CT Abdomen and Pelvis No Cont (11.10.18 @ 21:20) >  Interval development of sigmoid diverticulitis. No fluid collection or   pneumoperitoneum.    < end of copied text >      Assessment:  83y Female patient admitted w/ acute diverticulitis    Plan:    NPO, IVF  WBC 12.7  Cipro/flagyl  DVT/GI ppx  OOBAT  IS  Pain control    Date/Time: 11-14-18 @ 04:55

## 2018-11-14 NOTE — PROGRESS NOTE ADULT - SUBJECTIVE AND OBJECTIVE BOX
Nephrology progress note    Patient is seen and examined, events over the last 24 h noted .  Pt was hiramot feeling well last night but better now, cant explain what exactly was bothering her.    Allergies:  Keflex (Unknown)    Hospital Medications:   MEDICATIONS  (STANDING):  atorvastatin 40 milliGRAM(s) Oral at bedtime  calcitriol   Capsule 0.25 MICROGram(s) Oral daily  ciprofloxacin   IVPB      ciprofloxacin   IVPB 400 milliGRAM(s) IV Intermittent daily  dextrose 5% + sodium chloride 0.9%. 1000 milliLiter(s) (30 mL/Hr) IV Continuous <Continuous>  ferrous    sulfate 325 milliGRAM(s) Oral daily  folic acid 1 milliGRAM(s) Oral daily  furosemide    Tablet 40 milliGRAM(s) Oral daily  heparin  Injectable 5000 Unit(s) SubCutaneous every 8 hours  influenza   Vaccine 0.5 milliLiter(s) IntraMuscular once  insulin regular  human recombinant. 10 Unit(s) IV Push once  latanoprost 0.005% Ophthalmic Solution 1 Drop(s) Both EYES at bedtime  lidocaine   Patch 1 Patch Transdermal daily  metoprolol tartrate 50 milliGRAM(s) Oral two times a day  metroNIDAZOLE  IVPB 500 milliGRAM(s) IV Intermittent every 8 hours  metroNIDAZOLE  IVPB      pantoprazole    Tablet 40 milliGRAM(s) Oral before breakfast  sevelamer hydrochloride 800 milliGRAM(s) Oral three times a day with meals  simethicone 80 milliGRAM(s) Chew three times a day        VITALS:  T(F): 96.3 (18 @ 07:29), Max: 97.2 (18 @ 00:00)  HR: 60 (18 @ 07:29)  BP: 149/72 (18 @ 07:29)  RR: 18 (18 @ 07:29)  SpO2: --  Wt(kg): --     @ 07:01  -   @ 07:00  --------------------------------------------------------  IN: 0 mL / OUT: 300 mL / NET: -300 mL     @ 07:01  -   @ 07:00  --------------------------------------------------------  IN: 0 mL / OUT: 300 mL / NET: -300 mL     @ 07:01  -   @ 15:34  --------------------------------------------------------  IN: 480 mL / OUT: 800 mL / NET: -320 mL          PHYSICAL EXAM:  Constitutional: NAD  HEENT: anicteric sclera, oropharynx clear, MMM  Neck: No JVD  Respiratory: CTAB, no wheezes, rales or rhonchi  Cardiovascular: S1, S2, RRR  Gastrointestinal: BS+, soft, ND  Extremities: No cyanosis or clubbing. No peripheral edema  Neurological: A/O x 3  : No CVA tenderness. No carlos.   Skin: No rashes  Vascular Access:    LABS:      133<L>  |  91<L>  |  67<HH>  ----------------------------<  205<H>  4.5   |  23  |  2.8<H>  Creatinine Trend: 2.8<--, 2.9<--, 2.7<--, 1.6<--, 2.2<--, 2.2<--  Ca    8.6      2018 05:56  Phos  5.9       Mg     2.0         TPro  6.4  /  Alb  2.9<L>  /  TBili  0.3  /  DBili      /  AST  42<H>  /  ALT  31  /  AlkPhos  157<H>                            9.5    19.63 )-----------( 305      ( 2018 05:56 )             29.1       Urine Studies:  Urinalysis Basic - ( 2018 22:00 )    Color: Yellow / Appearance: Clear / S.010 / pH:   Gluc:  / Ketone: Negative  / Bili: Negative / Urobili: 0.2   Blood:  / Protein: Negative / Nitrite: Negative   Leuk Esterase: Negative / RBC:  / WBC    Sq Epi:  / Non Sq Epi:  / Bacteria:         RADIOLOGY & ADDITIONAL STUDIES:  < from: CT Abdomen and Pelvis w/ Oral Cont (18 @ 20:37) >  Mild improvement of sigmoid diverticulitis since 11/10/2018.    < end of copied text >  < from: Xray Chest 2 Views PA/Lat (18 @ 11:52) >  New right-sided interstitial opacities    < end of copied text >

## 2018-11-15 LAB
ANION GAP SERPL CALC-SCNC: 15 MMOL/L — HIGH (ref 7–14)
BASOPHILS # BLD AUTO: 0.03 K/UL — SIGNIFICANT CHANGE UP (ref 0–0.2)
BASOPHILS NFR BLD AUTO: 0.2 % — SIGNIFICANT CHANGE UP (ref 0–1)
BUN SERPL-MCNC: 57 MG/DL — HIGH (ref 10–20)
CALCIUM SERPL-MCNC: 8.4 MG/DL — LOW (ref 8.5–10.1)
CHLORIDE SERPL-SCNC: 95 MMOL/L — LOW (ref 98–110)
CO2 SERPL-SCNC: 23 MMOL/L — SIGNIFICANT CHANGE UP (ref 17–32)
CREAT SERPL-MCNC: 2.2 MG/DL — HIGH (ref 0.7–1.5)
EOSINOPHIL # BLD AUTO: 0.02 K/UL — SIGNIFICANT CHANGE UP (ref 0–0.7)
EOSINOPHIL NFR BLD AUTO: 0.1 % — SIGNIFICANT CHANGE UP (ref 0–8)
GLUCOSE SERPL-MCNC: 124 MG/DL — HIGH (ref 70–99)
HCT VFR BLD CALC: 27.5 % — LOW (ref 37–47)
HGB BLD-MCNC: 9 G/DL — LOW (ref 12–16)
IMM GRANULOCYTES NFR BLD AUTO: 1.5 % — HIGH (ref 0.1–0.3)
LYMPHOCYTES # BLD AUTO: 12.3 % — LOW (ref 20.5–51.1)
LYMPHOCYTES # BLD AUTO: 2.01 K/UL — SIGNIFICANT CHANGE UP (ref 1.2–3.4)
MAGNESIUM SERPL-MCNC: 1.9 MG/DL — SIGNIFICANT CHANGE UP (ref 1.8–2.4)
MCHC RBC-ENTMCNC: 28.8 PG — SIGNIFICANT CHANGE UP (ref 27–31)
MCHC RBC-ENTMCNC: 32.7 G/DL — SIGNIFICANT CHANGE UP (ref 32–37)
MCV RBC AUTO: 87.9 FL — SIGNIFICANT CHANGE UP (ref 81–99)
MONOCYTES # BLD AUTO: 1.81 K/UL — HIGH (ref 0.1–0.6)
MONOCYTES NFR BLD AUTO: 11.1 % — HIGH (ref 1.7–9.3)
NEUTROPHILS # BLD AUTO: 12.16 K/UL — HIGH (ref 1.4–6.5)
NEUTROPHILS NFR BLD AUTO: 74.8 % — SIGNIFICANT CHANGE UP (ref 42.2–75.2)
NRBC # BLD: 0 /100 WBCS — SIGNIFICANT CHANGE UP (ref 0–0)
PLATELET # BLD AUTO: 306 K/UL — SIGNIFICANT CHANGE UP (ref 130–400)
POTASSIUM SERPL-MCNC: 4.2 MMOL/L — SIGNIFICANT CHANGE UP (ref 3.5–5)
POTASSIUM SERPL-SCNC: 4.2 MMOL/L — SIGNIFICANT CHANGE UP (ref 3.5–5)
RBC # BLD: 3.13 M/UL — LOW (ref 4.2–5.4)
RBC # FLD: 13 % — SIGNIFICANT CHANGE UP (ref 11.5–14.5)
SODIUM SERPL-SCNC: 133 MMOL/L — LOW (ref 135–146)
WBC # BLD: 16.28 K/UL — HIGH (ref 4.8–10.8)
WBC # FLD AUTO: 16.28 K/UL — HIGH (ref 4.8–10.8)

## 2018-11-15 RX ADMIN — Medication 325 MILLIGRAM(S): at 11:21

## 2018-11-15 RX ADMIN — SEVELAMER CARBONATE 800 MILLIGRAM(S): 2400 POWDER, FOR SUSPENSION ORAL at 13:24

## 2018-11-15 RX ADMIN — PANTOPRAZOLE SODIUM 40 MILLIGRAM(S): 20 TABLET, DELAYED RELEASE ORAL at 05:51

## 2018-11-15 RX ADMIN — Medication 50 MILLIGRAM(S): at 17:53

## 2018-11-15 RX ADMIN — Medication 100 MILLIGRAM(S): at 21:12

## 2018-11-15 RX ADMIN — ATORVASTATIN CALCIUM 40 MILLIGRAM(S): 80 TABLET, FILM COATED ORAL at 21:12

## 2018-11-15 RX ADMIN — HEPARIN SODIUM 5000 UNIT(S): 5000 INJECTION INTRAVENOUS; SUBCUTANEOUS at 21:13

## 2018-11-15 RX ADMIN — SIMETHICONE 80 MILLIGRAM(S): 80 TABLET, CHEWABLE ORAL at 21:12

## 2018-11-15 RX ADMIN — Medication 50 MILLIGRAM(S): at 05:50

## 2018-11-15 RX ADMIN — HEPARIN SODIUM 5000 UNIT(S): 5000 INJECTION INTRAVENOUS; SUBCUTANEOUS at 05:50

## 2018-11-15 RX ADMIN — SIMETHICONE 80 MILLIGRAM(S): 80 TABLET, CHEWABLE ORAL at 13:25

## 2018-11-15 RX ADMIN — LATANOPROST 1 DROP(S): 0.05 SOLUTION/ DROPS OPHTHALMIC; TOPICAL at 21:13

## 2018-11-15 RX ADMIN — SIMETHICONE 80 MILLIGRAM(S): 80 TABLET, CHEWABLE ORAL at 05:50

## 2018-11-15 RX ADMIN — SEVELAMER CARBONATE 800 MILLIGRAM(S): 2400 POWDER, FOR SUSPENSION ORAL at 07:57

## 2018-11-15 RX ADMIN — LIDOCAINE 1 PATCH: 4 CREAM TOPICAL at 11:22

## 2018-11-15 RX ADMIN — Medication 100 MILLIGRAM(S): at 05:47

## 2018-11-15 RX ADMIN — Medication 100 MILLIGRAM(S): at 13:28

## 2018-11-15 RX ADMIN — Medication 200 MILLIGRAM(S): at 11:37

## 2018-11-15 RX ADMIN — SEVELAMER CARBONATE 800 MILLIGRAM(S): 2400 POWDER, FOR SUSPENSION ORAL at 17:53

## 2018-11-15 RX ADMIN — Medication 1 MILLIGRAM(S): at 11:21

## 2018-11-15 RX ADMIN — CALCITRIOL 0.25 MICROGRAM(S): 0.5 CAPSULE ORAL at 11:21

## 2018-11-15 RX ADMIN — HEPARIN SODIUM 5000 UNIT(S): 5000 INJECTION INTRAVENOUS; SUBCUTANEOUS at 13:24

## 2018-11-15 NOTE — CHART NOTE - NSCHARTNOTEFT_GEN_A_CORE
Registered Dietitian Follow-Up ([id number])    ***Scroll to the bottom for RD recommendation***    Patient Profile Reviewed                           Yes [x]   No []  Nutrition History Previously Obtained        Yes [x]  No []  - still clear, slow diet advancement.         PERTINENT MEDICAL INFORMATIONS:  (1) Passing gas + BM. Tolerating clear, pt c/o some abd pain.   (2) Will adv diet to full. c/w IV ABX.  (3) pt was p/w acute diverticulitis.        PERTINENT SUBJECTIVE INFORMATION:  (1) Pt is tolerating and doing well on clear, but not a big fan.         DIET ORDER:  clear liquid (no red)        ANTHROPOMETRICS:  - Ht.  160cm  - Wt. (11/4): 61.2kg - no new weight  - BMI. 23.9  - IBW       PERTINENT LAB DATA: 11/15: h/h 9.0/27.5, BUN 57, Cr 2.2, glucose 124, Ca 8.4, GFR 20  PERTINENT MEDS:  heparin, abx, iron, abx, acetaminophen, simethicone, vitamin D, b9, atorvastatin, metoprolol, protonix      PHYSICAL FINDINGS  - APPEARANCE:        alert and oriented. no edema. Little bit weak.  - GI FUNCTION:        LBM 11/14 per pt  - TUBES:                       - ORAL/MOUTH:      none reported  - SKIN:                        ecchymosis        NUTRITION REQUIREMENTS  WEIGHT USED:                          ABW is 61.2kg  ESTIMATED ENERGY NEEDS:       CONTINUE [  ]      ADJUST [  ]    ESTIMATED ENERGY NEEDS:         5214-6817 kcal/day (MSJ x 1.2-1.3)  ESTIMATED PROTEIN NEEDS:        61-73 g/day (1.0-1.2 g/kg of ABW)  ESTIMATED FLUID NEEDS:             1ml/kcal    CURRENT NUTRIENT NEEDS:    remains          [ x ] PREVIOUS NUTRITION DIAGNOSIS:   (1) Inadequate protein-energy intake - remains            [ x ] ONGOING        [  ] RESOLVED          NUTRITION INTERVENTION:    [ x ] ORAL        [ ] EN/TF     GOAL/EXPECTED OUTCOME:     pt to consume and tolerate >75% of all meals and snacks and rec supplement upon f/u  INDICATOR/MONITORING:       RD to monitor diet order, energy intake, body composition, nutrition focused physical findings (PO tolerance)  PATIENT's INTERVENTION:        Meals and snacks. Medical food supplements      RECS: (1) Please advance diet to full liquids and order ENSURE ENLIVE q12hr. IF TOLERATED, goal diet to advance to is DYSPHAGIA 2 MECHANICAL SOFT w/ Thins and LOW RESIDUE. Because pt reports of poor dentition and needs very soft foods. Pt asks for pureed but will try GROUND first.

## 2018-11-15 NOTE — PROGRESS NOTE ADULT - SUBJECTIVE AND OBJECTIVE BOX
Hospital Day:  11d    Subjective:  No overnight events. Seen and examined at bedside. Reports that she is improving. States her abdominal pain has gone down to a 6-7/10 from 10/10 yesterday. She states that her knee pain is also improved. No acute complaints at time of exam. She was resting comfortably. No fevers, chills, chest pain, shortness of breath, weakness, fatigue, constipation, nausea, vomiting, difficulty urinating. She did state that she was having loose formed bowel movements. Will continue to monitor.     Past Medical Hx:   Hypertension  Pacemaker  Chronic kidney disease    Past Sx:  Artificial pacemaker    Allergies:  Keflex (Unknown)    Current Meds:   Standng Meds:  atorvastatin 40 milliGRAM(s) Oral at bedtime  calcitriol   Capsule 0.25 MICROGram(s) Oral daily  ciprofloxacin   IVPB      ciprofloxacin   IVPB 400 milliGRAM(s) IV Intermittent daily  dextrose 5% + sodium chloride 0.9%. 1000 milliLiter(s) (30 mL/Hr) IV Continuous <Continuous>  ferrous    sulfate 325 milliGRAM(s) Oral daily  folic acid 1 milliGRAM(s) Oral daily  heparin  Injectable 5000 Unit(s) SubCutaneous every 8 hours  influenza   Vaccine 0.5 milliLiter(s) IntraMuscular once  insulin regular  human recombinant. 10 Unit(s) IV Push once  latanoprost 0.005% Ophthalmic Solution 1 Drop(s) Both EYES at bedtime  lidocaine   Patch 1 Patch Transdermal daily  metoprolol tartrate 50 milliGRAM(s) Oral two times a day  metroNIDAZOLE  IVPB 500 milliGRAM(s) IV Intermittent every 8 hours  metroNIDAZOLE  IVPB      pantoprazole    Tablet 40 milliGRAM(s) Oral before breakfast  sevelamer hydrochloride 800 milliGRAM(s) Oral three times a day with meals  simethicone 80 milliGRAM(s) Chew three times a day    PRN Meds:  acetaminophen   Tablet .. 650 milliGRAM(s) Oral every 6 hours PRN Temp greater or equal to 38C (100.4F), Mild Pain (1 - 3)    Vital Signs:   T(F): 97 (11-15-18 @ 07:00), Max: 98.2 (18 @ 23:00)  HR: 6 (11-15-18 @ 07:39) (6 - 64)  BP: 143/68 (11-15-18 @ 07:00) (125/58 - 145/65)  RR: 18 (11-15-18 @ 07:00) (18 - 18)  SpO2: --      18 @ 07:01  -  11-15-18 @ 07:00  --------------------------------------------------------  IN: 480 mL / OUT: 800 mL / NET: -320 mL    11-15-18 @ 07:01  -  11-15-18 @ 11:18  --------------------------------------------------------  IN: 350 mL / OUT: 0 mL / NET: 350 mL        Physical Exam:   GENERAL: Resting in bed, appearing stated age. In mild painful distress  HEENT: NCAT, PERRLA, EOMI  CHEST/LUNG: CTA, No wheezing, rhonchi, rales  HEART: Regular rate and rhythm; s1 s2 appreciated, No murmurs, rubs, or gallops  ABDOMEN: Bowel sounds present, Mild epigastric and LLQ tenderness  EXTREMITIES: No LE edema b/l, knees bilaterally warm, swollen, and mildly tender to palpation   NERVOUS SYSTEM:  Alert & Oriented X3, Cranial nerves ii-xii grossly intact    Labs:                         9.0    16.28 )-----------( 306      ( 15 Nov 2018 06:45 )             27.5     Neutophil% 74.8, Lymphocyte% 12.3, Monocyte% 11.1, Bands% 1.5 11-15-18 @ 06:45    15 Nov 2018 06:45    133    |  95     |  57     ----------------------------<  124    4.2     |  23     |  2.2      Ca    8.4        15 Nov 2018 06:45  Mg     1.9       15 Nov 2018 06:45    Iron 18, TIBC 181, %Sat 10 Ferritin 476 18 @ 06:59      Urinalysis Basic - ( 2018 21:00 )    Color: Yellow / Appearance: Clear / S.015 / pH: x  Gluc: x / Ketone: Negative  / Bili: Negative / Urobili: 0.2 mg/dL   Blood: x / Protein: 100 mg/dL / Nitrite: Negative   Leuk Esterase: Small / RBC: x / WBC x   Sq Epi: x / Non Sq Epi: Few /HPF / Bacteria: Few /HPF    Radiology:   < from: CT Abdomen and Pelvis w/ Oral Cont (18 @ 20:37) >  IMPRESSION:     Mild improvement of sigmoid diverticulitis since 11/10/2018.    Xray Chest 1 View- PORTABLE-Routine (11.15.18 @ 06:34)  INTERPRETATION:  CLINICAL HISTORY / REASON FOR EXAM: Shortness of breath.    COMPARISON: Chest radiograph from 2018.     TECHNIQUE/POSITIONING: Satisfactory. Single image, AP portable chest   radiograph.    FINDINGS:    SUPPORT DEVICES: ICD implant overlies the left hemithorax.    CARDIAC/MEDIASTINUM/HILUM: Stable cardiomegaly.    LUNG PARENCHYMA/PLEURA: No focal consolidation or pleural effusion. No   pneumothorax.    SKELETON/SOFT TISSUES: Stable.      IMPRESSION:      No radiographic evidence of acute cardiopulmonary disease.    Assessment and Plan:   This is an 83 year old female with PMHx of CKD stage 4, Recent DVT/PE provoked by travel and completed a 6month course of Eliquis, CHF s/p PPM who presented to the ED for a cough and chest pain    #Left flank and LLQ pain   - CT 11/10 interval development of sigmoid diverticulitis with no fluid collection or pneumoperitoneum   - Continue on Cipro and Flagyl   - Surgery consulted and following. Requesting medical clearance for possible surgical intervention due to concern for surgical abdomen  - Repeat CT scan done 2018; improvement in diverticulitis   - Diet increased to full liquid     #Left knee pain (resolving)   - Most likely gouty arthritis  - Bilateral Knee x-rays ordered, no acute fracture. Degenerative changes    - PT eval     #Hyperkalemia (resolved)  - Nephrology as an outpatient   - No ACE/ARB  - Monitor K if over 5.5 Insulin and D50 with EKG     #HTN (BP stable)   - Continue on current medications     #MBD  - Check Ph, and iPTH  - Calcium WNL     #Anemia  - Start on FeSO4, Check serum iron studies     #Chest pain  - ICD interrogated and no events   - ACS ruled out   - Continue on metoprolol, Lasix, and Atorvastatin    #Cough   - Unlikely PNA, seen by pulmonary     #HTN  - c/w  lopressor, Lasix    #CKD stage 4  - stable (baseline ~ 2.3 - 2.8)  - c/w calcitriol  - monitor bmp    #Urine Retention  - Complains of difficulty and pain when emptying bladder  - Morgan placed, UA and urine culture    #Loose watery Bowel Movement  - Will continue to monitor. If over 3 in 24 hours will order a C. Diff PCR     Diet: Full liquid   DVT ppx Lovenox 40mg daily   GI ppx:  Pantoprazole   Activity: ambulate as tolerated   Code Status: Full Code   Dispo: Continue to monitor; from home

## 2018-11-15 NOTE — PROGRESS NOTE ADULT - ASSESSMENT
Patient with CKD 4 (Baseline Cr. ~ 1.95 - 2.2) presented to hospital with chest pain and dry cough, LLQ pain, b/l knee pain, left flank pain, dysuria, urinary frequency  PMH HTN, CKD 4 (baseline cr. ~ 1.95 - 2.2), PPM.    # CKD 4/Hyperkalemia   - S. Cr. returned to her baseline.  -  no fluid overload, hold LAsix, cont maintenance IVF NS 50 cc/hr   - low K diet   - Monitor I & O, repeat phos please on renagel  -  repeat CXR assess PVC seen last week    # Sepsis - diverticulitis unchanged as per CT last night  WBC up to 19x, due to steroids?  monitor, on Flagyl and Cipro    # HTN   - BP noted   - cont. current meds    # Anemia Hb stable, Fe studies noted for iron deficiency, increase FeSO4 to 325 mg q 8 hr. no need for ELIJAH at the moment    #Gouty arthritis - off steroids    D/W HS

## 2018-11-15 NOTE — PROGRESS NOTE ADULT - ASSESSMENT
Assessment:  83y Female patient admitted w/ acute diverticulitis    Plan:  Clears, IVF  WBC 19.63. f/u cbc  Cipro/flagyl  DVT/GI ppx  OOBAT  IS  Pain control Assessment:  83y Female patient admitted w/ acute diverticulitis    Plan:  Clears, IVF  WBC 19.63. f/u cbc  Cipro/flagyl  DVT/GI ppx  OOBAT  IS  Pain control    We are recommending repeat CT abd/pelvis with po contrast only to rule out intra-abdominal abscess. Pt still has tenderness and leukocytosis.

## 2018-11-15 NOTE — PROGRESS NOTE ADULT - SUBJECTIVE AND OBJECTIVE BOX
Nephrology progress note    Patient is seen and examined, events over the last 24 h noted.  No new complaints.    Allergies:  Keflex (Unknown)    Hospital Medications:   MEDICATIONS  (STANDING):  atorvastatin 40 milliGRAM(s) Oral at bedtime  calcitriol   Capsule 0.25 MICROGram(s) Oral daily  ciprofloxacin   IVPB      ciprofloxacin   IVPB 400 milliGRAM(s) IV Intermittent daily  dextrose 5% + sodium chloride 0.9%. 1000 milliLiter(s) (30 mL/Hr) IV Continuous <Continuous>  ferrous    sulfate 325 milliGRAM(s) Oral daily  folic acid 1 milliGRAM(s) Oral daily  heparin  Injectable 5000 Unit(s) SubCutaneous every 8 hours  influenza   Vaccine 0.5 milliLiter(s) IntraMuscular once  insulin regular  human recombinant. 10 Unit(s) IV Push once  latanoprost 0.005% Ophthalmic Solution 1 Drop(s) Both EYES at bedtime  lidocaine   Patch 1 Patch Transdermal daily  metoprolol tartrate 50 milliGRAM(s) Oral two times a day  metroNIDAZOLE  IVPB 500 milliGRAM(s) IV Intermittent every 8 hours  metroNIDAZOLE  IVPB      pantoprazole    Tablet 40 milliGRAM(s) Oral before breakfast  sevelamer hydrochloride 800 milliGRAM(s) Oral three times a day with meals  simethicone 80 milliGRAM(s) Chew three times a day        VITALS:  T(F): 97 (11-15-18 @ 07:00), Max: 98.2 (18 @ 23:00)  HR: 6 (11-15-18 @ 07:39)  BP: 143/68 (11-15-18 @ 07:00)  RR: 18 (11-15-18 @ 07:00)       @ 07:  -   @ 07:00  --------------------------------------------------------  IN: 0 mL / OUT: 300 mL / NET: -300 mL     @ 07:01  -  11-15 @ 07:00  --------------------------------------------------------  IN: 480 mL / OUT: 800 mL / NET: -320 mL    11-15 @ 07:01  -  11-15 @ 13:43  --------------------------------------------------------  IN: 350 mL / OUT: 0 mL / NET: 350 mL          PHYSICAL EXAM:  Iron with Total Binding Capacity in AM (18 @ 06:59)    Iron - Total Binding Capacity.: 181 ug/dL    % Saturation, Iron: 10 %    Iron Total, Serum: 18 ug/dL    Unsaturated Iron Binding Capacity: 163 ug/dL    Constitutional: NAD  Respiratory: CTAB, no wheezes, rales or rhonchi  Cardiovascular: S1, S2, RRR  Extremities: No peripheral edema  :  No carlos.       LABS:  11-15    133<L>  |  95<L>  |  57<H>  ----------------------------<  124<H>  4.2   |  23  |  2.2<H>    Ca    8.4<L>      15 Nov 2018 06:45  Mg     1.9     11-15                            9.0    16.28 )-----------( 306      ( 15 Nov 2018 06:45 )             27.5     Iron with Total Binding Capacity in AM (18 @ 06:59)    Iron - Total Binding Capacity.: 181 ug/dL    % Saturation, Iron: 10 %    Iron Total, Serum: 18 ug/dL    Unsaturated Iron Binding Capacity: 163 ug/dL        Urine Studies:  Urinalysis Basic - ( 2018 21:00 )    Color: Yellow / Appearance: Clear / S.015 / pH:   Gluc:  / Ketone: Negative  / Bili: Negative / Urobili: 0.2 mg/dL   Blood:  / Protein: 100 mg/dL / Nitrite: Negative   Leuk Esterase: Small / RBC:  / WBC    Sq Epi:  / Non Sq Epi: Few /HPF / Bacteria: Few /HPF        RADIOLOGY & ADDITIONAL STUDIES:

## 2018-11-15 NOTE — PROGRESS NOTE ADULT - ASSESSMENT
Patient has a uti infection and was wondering if Dr. Platt will send over a prescription    advance diet to full liquis  iv antibiotics   discussed with resident

## 2018-11-15 NOTE — PROGRESS NOTE ADULT - SUBJECTIVE AND OBJECTIVE BOX
RACHEL SUMMERS  83y Female   [id number]    Procedure/Diagnosis: acute diverticulitis    Events/ 24h: Passing gas and having BM. Tolerating clears. Mild abdominal pain. No nausea or vomiting.     Patient is a 83y old  Female who presents with a chief complaint of chest pain and SOB (2018 15:34)    PAST MEDICAL & SURGICAL HISTORY:  Hypertension  Pacemaker  Chronic kidney disease  Artificial pacemaker    Vital Signs Last 24 Hrs  T(C): 36.8 (2018 23:00), Max: 36.8 (2018 23:00)  T(F): 98.2 (2018 23:00), Max: 98.2 (2018 23:00)  HR: 64 (:) (60 - 64)  BP: 145/65 (2018 23:00) (125/58 - 149/72)  RR: 18 (:) (18 - 18)    Diet, Clear Liquid:   No Red Dye (18 @ 11:43)    I&O's Detail    2018 07:01  -  2018 07:00  --------------------------------------------------------  IN:  Total IN: 0 mL    OUT:    Voided: 300 mL  Total OUT: 300 mL    Total NET: -300 mL    2018 07:01  -  15 Nov 2018 03:22  --------------------------------------------------------  IN:    Oral Fluid: 480 mL  Total IN: 480 mL    OUT:    Voided: 800 mL  Total OUT: 800 mL    Total NET: -320 mL    MEDICATIONS  (STANDING):  atorvastatin 40 milliGRAM(s) Oral at bedtime  calcitriol   Capsule 0.25 MICROGram(s) Oral daily  ciprofloxacin   IVPB      ciprofloxacin   IVPB 400 milliGRAM(s) IV Intermittent daily  dextrose 5% + sodium chloride 0.9%. 1000 milliLiter(s) (30 mL/Hr) IV Continuous <Continuous>  ferrous    sulfate 325 milliGRAM(s) Oral daily  folic acid 1 milliGRAM(s) Oral daily  heparin  Injectable 5000 Unit(s) SubCutaneous every 8 hours  influenza   Vaccine 0.5 milliLiter(s) IntraMuscular once  insulin regular  human recombinant. 10 Unit(s) IV Push once  latanoprost 0.005% Ophthalmic Solution 1 Drop(s) Both EYES at bedtime  lidocaine   Patch 1 Patch Transdermal daily  metoprolol tartrate 50 milliGRAM(s) Oral two times a day  metroNIDAZOLE  IVPB 500 milliGRAM(s) IV Intermittent every 8 hours  metroNIDAZOLE  IVPB      pantoprazole    Tablet 40 milliGRAM(s) Oral before breakfast  sevelamer hydrochloride 800 milliGRAM(s) Oral three times a day with meals  simethicone 80 milliGRAM(s) Chew three times a day    MEDICATIONS  (PRN):  acetaminophen   Tablet .. 650 milliGRAM(s) Oral every 6 hours PRN Temp greater or equal to 38C (100.4F), Mild Pain (1 - 3)    Physical Examination:  General Appearance: NAD  HEENT: EOMI, sclera non-icteric.  Heart: RRR   Lungs: CTABL.   Abdomen:  Soft, nondistended. Mild Lower abdominal area tenderness.   MSK/Extremities: Warm & well-perfused. Peripheral pulses intact.  Skin: Warm, dry. No jaundice.     LABS:                      9.5    19.63 )-----------( 305      ( 2018 05:56 )             29.1        -    133<L>  |  91<L>  |  67<HH>  ----------------------------<  205<H>  4.5   |  23  |  2.8<H>    Ca    8.6      2018 05:56  Phos  5.9     -  Mg     2.0         TPro  6.4  /  Alb  2.9<L>  /  TBili  0.3  /  DBili  x   /  AST  42<H>  /  ALT  31  /  AlkPhos  157<H>      LIVER FUNCTIONS - ( 2018 06:59 )  Alb: 2.9 g/dL / Pro: 6.4 g/dL / ALK PHOS: 157 U/L / ALT: 31 U/L / AST: 42 U/L / GGT: x           Urinalysis Basic - ( 2018 21:00 )    Color: Yellow / Appearance: Clear / S.015 / pH: x  Gluc: x / Ketone: Negative  / Bili: Negative / Urobili: 0.2 mg/dL   Blood: x / Protein: 100 mg/dL / Nitrite: Negative   Leuk Esterase: Small / RBC: x / WBC x   Sq Epi: x / Non Sq Epi: Few /HPF / Bacteria: Few /HPF    IMAGING:  None RAHCEL SUMMERS  83y Female   [id number]    Procedure/Diagnosis: acute diverticulitis    Events/ 24h: Passing gas and having BM. Tolerating clears. Mild abdominal pain. No nausea or vomiting.     Patient is a 83y old  Female who presents with a chief complaint of chest pain and SOB (2018 15:34)    PAST MEDICAL & SURGICAL HISTORY:  Hypertension  Pacemaker  Chronic kidney disease  Artificial pacemaker    Vital Signs Last 24 Hrs  T(C): 36.8 (2018 23:00), Max: 36.8 (2018 23:00)  T(F): 98.2 (2018 23:00), Max: 98.2 (2018 23:00)  HR: 64 (:) (60 - 64)  BP: 145/65 (2018 23:00) (125/58 - 149/72)  RR: 18 (:) (18 - 18)    Diet, Clear Liquid:   No Red Dye (18 @ 11:43)    I&O's Detail    2018 07:01  -  2018 07:00  --------------------------------------------------------  IN:  Total IN: 0 mL    OUT:    Voided: 300 mL  Total OUT: 300 mL    Total NET: -300 mL    2018 07:01  -  15 Nov 2018 03:22  --------------------------------------------------------  IN:    Oral Fluid: 480 mL  Total IN: 480 mL    OUT:    Voided: 800 mL  Total OUT: 800 mL    Total NET: -320 mL    MEDICATIONS  (STANDING):  atorvastatin 40 milliGRAM(s) Oral at bedtime  calcitriol   Capsule 0.25 MICROGram(s) Oral daily  ciprofloxacin   IVPB      ciprofloxacin   IVPB 400 milliGRAM(s) IV Intermittent daily  dextrose 5% + sodium chloride 0.9%. 1000 milliLiter(s) (30 mL/Hr) IV Continuous <Continuous>  ferrous    sulfate 325 milliGRAM(s) Oral daily  folic acid 1 milliGRAM(s) Oral daily  heparin  Injectable 5000 Unit(s) SubCutaneous every 8 hours  influenza   Vaccine 0.5 milliLiter(s) IntraMuscular once  insulin regular  human recombinant. 10 Unit(s) IV Push once  latanoprost 0.005% Ophthalmic Solution 1 Drop(s) Both EYES at bedtime  lidocaine   Patch 1 Patch Transdermal daily  metoprolol tartrate 50 milliGRAM(s) Oral two times a day  metroNIDAZOLE  IVPB 500 milliGRAM(s) IV Intermittent every 8 hours  metroNIDAZOLE  IVPB      pantoprazole    Tablet 40 milliGRAM(s) Oral before breakfast  sevelamer hydrochloride 800 milliGRAM(s) Oral three times a day with meals  simethicone 80 milliGRAM(s) Chew three times a day    MEDICATIONS  (PRN):  acetaminophen   Tablet .. 650 milliGRAM(s) Oral every 6 hours PRN Temp greater or equal to 38C (100.4F), Mild Pain (1 - 3)    Physical Examination:  General Appearance: NAD  HEENT: EOMI, sclera non-icteric.  Heart: RRR   Lungs: CTABL.   Abdomen:  Soft, nondistended. Mild Lower abdominal area tenderness.   MSK/Extremities: Warm & well-perfused. Peripheral pulses intact.  Skin: Warm, dry. No jaundice.     LABS:                      9.5    19.63 )-----------( 305      ( 2018 05:56 )             29.1        -    133<L>  |  91<L>  |  67<HH>  ----------------------------<  205<H>  4.5   |  23  |  2.8<H>    Ca    8.6      2018 05:56  Phos  5.9     -  Mg     2.0         TPro  6.4  /  Alb  2.9<L>  /  TBili  0.3  /  DBili  x   /  AST  42<H>  /  ALT  31  /  AlkPhos  157<H>      LIVER FUNCTIONS - ( 2018 06:59 )  Alb: 2.9 g/dL / Pro: 6.4 g/dL / ALK PHOS: 157 U/L / ALT: 31 U/L / AST: 42 U/L / GGT: x           Urinalysis Basic - ( 2018 21:00 )    Color: Yellow / Appearance: Clear / S.015 / pH: x  Gluc: x / Ketone: Negative  / Bili: Negative / Urobili: 0.2 mg/dL   Blood: x / Protein: 100 mg/dL / Nitrite: Negative   Leuk Esterase: Small / RBC: x / WBC x   Sq Epi: x / Non Sq Epi: Few /HPF / Bacteria: Few /HPF    IMAGING:  CT Abd/pelvis @20:37    PERITONEUM/MESENTERY/BOWEL: Mild interval improvement in degree of wall   thickening and pericolonic stranding of the sigmoid colon. No new areas   of inflammation. No collections. No free air or free fluid.

## 2018-11-15 NOTE — PROGRESS NOTE ADULT - SUBJECTIVE AND OBJECTIVE BOX
LENGTH OF HOSPITAL STAY: 11d    CHIEF COMPLAINT:   Patient is a 83y old  Female who presents with a chief complaint of chest pain and SOB (15 Nov 2018 03:22)      HISTORY OF PRESENTING ILLNESS:    HPI:  83 yr old female with pmhx of HTN, CKD 4, recent DVT/PE (thought provoked by travel, completed 6mo a/c and no longer on Eliquis) CHF s/p PPM presented to ER for cough and chest pain. As per patient, she developed non productive cough for the past 2 days with no fevers. Today morning 3am she woke up with pain in the L flank and b/l knees that is 6-8/10 intermittent and she also had one episode of chest tightness centrally located, non radiating which resolved on its own. She denies any fever, chills, headache, urinary or bowel issues. No recent travel or sick contacts. (2018 19:50)    Patient feels better abdominal pain has improved    PAST MEDICAL & SURGICAL HISTORY  PAST MEDICAL & SURGICAL HISTORY:  Hypertension  Pacemaker  Chronic kidney disease  Artificial pacemaker    SOCIAL HISTORY:    ALLERGIES:  Keflex (Unknown)    MEDICATIONS:  STANDING MEDICATIONS  atorvastatin 40 milliGRAM(s) Oral at bedtime  calcitriol   Capsule 0.25 MICROGram(s) Oral daily  ciprofloxacin   IVPB      ciprofloxacin   IVPB 400 milliGRAM(s) IV Intermittent daily  dextrose 5% + sodium chloride 0.9%. 1000 milliLiter(s) IV Continuous <Continuous>  ferrous    sulfate 325 milliGRAM(s) Oral daily  folic acid 1 milliGRAM(s) Oral daily  heparin  Injectable 5000 Unit(s) SubCutaneous every 8 hours  influenza   Vaccine 0.5 milliLiter(s) IntraMuscular once  insulin regular  human recombinant. 10 Unit(s) IV Push once  latanoprost 0.005% Ophthalmic Solution 1 Drop(s) Both EYES at bedtime  lidocaine   Patch 1 Patch Transdermal daily  metoprolol tartrate 50 milliGRAM(s) Oral two times a day  metroNIDAZOLE  IVPB 500 milliGRAM(s) IV Intermittent every 8 hours  metroNIDAZOLE  IVPB      pantoprazole    Tablet 40 milliGRAM(s) Oral before breakfast  sevelamer hydrochloride 800 milliGRAM(s) Oral three times a day with meals  simethicone 80 milliGRAM(s) Chew three times a day    PRN MEDICATIONS  acetaminophen   Tablet .. 650 milliGRAM(s) Oral every 6 hours PRN    VITALS:   T(F): 97  HR: 6  BP: 143/68  RR: 18  SpO2: --    LABS:                        9.0    16.28 )-----------( 306      ( 15 Nov 2018 06:45 )             27.5     11-15    133<L>  |  95<L>  |  57<H>  ----------------------------<  124<H>  4.2   |  23  |  2.2<H>    Ca    8.4<L>      15 Nov 2018 06:45  Mg     1.9     -15        Urinalysis Basic - ( 2018 21:00 )    Color: Yellow / Appearance: Clear / S.015 / pH: x  Gluc: x / Ketone: Negative  / Bili: Negative / Urobili: 0.2 mg/dL   Blood: x / Protein: 100 mg/dL / Nitrite: Negative   Leuk Esterase: Small / RBC: x / WBC x   Sq Epi: x / Non Sq Epi: Few /HPF / Bacteria: Few /HPF                RADIOLOGY:    PHYSICAL EXAM:  GEN: No acute distress  HEENT:   LUNGS: Clear to auscultation bilaterally   HEART: S1/S2 present. RRR.   ABD: Soft, non-tender, non-distended. Bowel sounds present  EXT:  NEURO: AAOX3

## 2018-11-16 LAB
ANION GAP SERPL CALC-SCNC: 13 MMOL/L — SIGNIFICANT CHANGE UP (ref 7–14)
BASOPHILS # BLD AUTO: 0.03 K/UL — SIGNIFICANT CHANGE UP (ref 0–0.2)
BASOPHILS NFR BLD AUTO: 0.3 % — SIGNIFICANT CHANGE UP (ref 0–1)
BUN SERPL-MCNC: 47 MG/DL — HIGH (ref 10–20)
CALCIUM SERPL-MCNC: 8.3 MG/DL — LOW (ref 8.5–10.1)
CHLORIDE SERPL-SCNC: 99 MMOL/L — SIGNIFICANT CHANGE UP (ref 98–110)
CO2 SERPL-SCNC: 25 MMOL/L — SIGNIFICANT CHANGE UP (ref 17–32)
CREAT SERPL-MCNC: 2 MG/DL — HIGH (ref 0.7–1.5)
CULTURE RESULTS: SIGNIFICANT CHANGE UP
EOSINOPHIL # BLD AUTO: 0.1 K/UL — SIGNIFICANT CHANGE UP (ref 0–0.7)
EOSINOPHIL NFR BLD AUTO: 0.8 % — SIGNIFICANT CHANGE UP (ref 0–8)
GLUCOSE BLDC GLUCOMTR-MCNC: 232 MG/DL — HIGH (ref 70–99)
GLUCOSE SERPL-MCNC: 89 MG/DL — SIGNIFICANT CHANGE UP (ref 70–99)
HCT VFR BLD CALC: 27.6 % — LOW (ref 37–47)
HGB BLD-MCNC: 8.9 G/DL — LOW (ref 12–16)
IMM GRANULOCYTES NFR BLD AUTO: 2.3 % — HIGH (ref 0.1–0.3)
LYMPHOCYTES # BLD AUTO: 2.42 K/UL — SIGNIFICANT CHANGE UP (ref 1.2–3.4)
LYMPHOCYTES # BLD AUTO: 20.2 % — LOW (ref 20.5–51.1)
MAGNESIUM SERPL-MCNC: 1.8 MG/DL — SIGNIFICANT CHANGE UP (ref 1.8–2.4)
MCHC RBC-ENTMCNC: 28.6 PG — SIGNIFICANT CHANGE UP (ref 27–31)
MCHC RBC-ENTMCNC: 32.2 G/DL — SIGNIFICANT CHANGE UP (ref 32–37)
MCV RBC AUTO: 88.7 FL — SIGNIFICANT CHANGE UP (ref 81–99)
MONOCYTES # BLD AUTO: 1.46 K/UL — HIGH (ref 0.1–0.6)
MONOCYTES NFR BLD AUTO: 12.2 % — HIGH (ref 1.7–9.3)
NEUTROPHILS # BLD AUTO: 7.69 K/UL — HIGH (ref 1.4–6.5)
NEUTROPHILS NFR BLD AUTO: 64.2 % — SIGNIFICANT CHANGE UP (ref 42.2–75.2)
NRBC # BLD: 0 /100 WBCS — SIGNIFICANT CHANGE UP (ref 0–0)
PLATELET # BLD AUTO: 324 K/UL — SIGNIFICANT CHANGE UP (ref 130–400)
POTASSIUM SERPL-MCNC: 4.8 MMOL/L — SIGNIFICANT CHANGE UP (ref 3.5–5)
POTASSIUM SERPL-SCNC: 4.8 MMOL/L — SIGNIFICANT CHANGE UP (ref 3.5–5)
RBC # BLD: 3.11 M/UL — LOW (ref 4.2–5.4)
RBC # FLD: 13.2 % — SIGNIFICANT CHANGE UP (ref 11.5–14.5)
SODIUM SERPL-SCNC: 137 MMOL/L — SIGNIFICANT CHANGE UP (ref 135–146)
SPECIMEN SOURCE: SIGNIFICANT CHANGE UP
WBC # BLD: 11.98 K/UL — HIGH (ref 4.8–10.8)
WBC # FLD AUTO: 11.98 K/UL — HIGH (ref 4.8–10.8)

## 2018-11-16 RX ORDER — PANTOPRAZOLE SODIUM 20 MG/1
40 TABLET, DELAYED RELEASE ORAL DAILY
Qty: 0 | Refills: 0 | Status: DISCONTINUED | OUTPATIENT
Start: 2018-11-16 | End: 2018-11-20

## 2018-11-16 RX ADMIN — SEVELAMER CARBONATE 800 MILLIGRAM(S): 2400 POWDER, FOR SUSPENSION ORAL at 17:06

## 2018-11-16 RX ADMIN — Medication 50 MILLIGRAM(S): at 05:42

## 2018-11-16 RX ADMIN — Medication 100 MILLIGRAM(S): at 21:01

## 2018-11-16 RX ADMIN — HEPARIN SODIUM 5000 UNIT(S): 5000 INJECTION INTRAVENOUS; SUBCUTANEOUS at 05:42

## 2018-11-16 RX ADMIN — HEPARIN SODIUM 5000 UNIT(S): 5000 INJECTION INTRAVENOUS; SUBCUTANEOUS at 21:01

## 2018-11-16 RX ADMIN — HEPARIN SODIUM 5000 UNIT(S): 5000 INJECTION INTRAVENOUS; SUBCUTANEOUS at 14:39

## 2018-11-16 RX ADMIN — SIMETHICONE 80 MILLIGRAM(S): 80 TABLET, CHEWABLE ORAL at 21:01

## 2018-11-16 RX ADMIN — Medication 1 MILLIGRAM(S): at 12:26

## 2018-11-16 RX ADMIN — Medication 100 MILLIGRAM(S): at 13:41

## 2018-11-16 RX ADMIN — CALCITRIOL 0.25 MICROGRAM(S): 0.5 CAPSULE ORAL at 17:07

## 2018-11-16 RX ADMIN — PANTOPRAZOLE SODIUM 40 MILLIGRAM(S): 20 TABLET, DELAYED RELEASE ORAL at 05:45

## 2018-11-16 RX ADMIN — SIMETHICONE 80 MILLIGRAM(S): 80 TABLET, CHEWABLE ORAL at 05:43

## 2018-11-16 RX ADMIN — ATORVASTATIN CALCIUM 40 MILLIGRAM(S): 80 TABLET, FILM COATED ORAL at 21:01

## 2018-11-16 RX ADMIN — SEVELAMER CARBONATE 800 MILLIGRAM(S): 2400 POWDER, FOR SUSPENSION ORAL at 09:19

## 2018-11-16 RX ADMIN — Medication 50 MILLIGRAM(S): at 17:07

## 2018-11-16 RX ADMIN — SIMETHICONE 80 MILLIGRAM(S): 80 TABLET, CHEWABLE ORAL at 14:40

## 2018-11-16 RX ADMIN — Medication 325 MILLIGRAM(S): at 12:25

## 2018-11-16 RX ADMIN — SEVELAMER CARBONATE 800 MILLIGRAM(S): 2400 POWDER, FOR SUSPENSION ORAL at 12:26

## 2018-11-16 RX ADMIN — LIDOCAINE 1 PATCH: 4 CREAM TOPICAL at 12:25

## 2018-11-16 RX ADMIN — Medication 200 MILLIGRAM(S): at 13:41

## 2018-11-16 RX ADMIN — LATANOPROST 1 DROP(S): 0.05 SOLUTION/ DROPS OPHTHALMIC; TOPICAL at 21:02

## 2018-11-16 RX ADMIN — Medication 100 MILLIGRAM(S): at 05:43

## 2018-11-16 NOTE — PROGRESS NOTE ADULT - SUBJECTIVE AND OBJECTIVE BOX
Nephrology progress note    Patient was seen and examined, events over the last 24 h noted .  Cr improving     Allergies:  Keflex (Unknown)    Hospital Medications:   MEDICATIONS  (STANDING):  atorvastatin 40 milliGRAM(s) Oral at bedtime  calcitriol   Capsule 0.25 MICROGram(s) Oral daily  ciprofloxacin   IVPB      ciprofloxacin   IVPB 400 milliGRAM(s) IV Intermittent daily  dextrose 5% + sodium chloride 0.9%. 1000 milliLiter(s) (30 mL/Hr) IV Continuous <Continuous>  ferrous    sulfate 325 milliGRAM(s) Oral daily  folic acid 1 milliGRAM(s) Oral daily  heparin  Injectable 5000 Unit(s) SubCutaneous every 8 hours  influenza   Vaccine 0.5 milliLiter(s) IntraMuscular once  insulin regular  human recombinant. 10 Unit(s) IV Push once  latanoprost 0.005% Ophthalmic Solution 1 Drop(s) Both EYES at bedtime  lidocaine   Patch 1 Patch Transdermal daily  metoprolol tartrate 50 milliGRAM(s) Oral two times a day  metroNIDAZOLE  IVPB 500 milliGRAM(s) IV Intermittent every 8 hours  metroNIDAZOLE  IVPB      pantoprazole    Tablet 40 milliGRAM(s) Oral before breakfast  sevelamer hydrochloride 800 milliGRAM(s) Oral three times a day with meals  simethicone 80 milliGRAM(s) Chew three times a day        VITALS:  T(F): 98.2 (18 @ 07:25), Max: 98.2 (18 @ 07:25)  HR: 62 (18 @ 07:25)  BP: 165/79 (18 @ 07:25)  RR: 18 (18 @ 07:25)  SpO2: --  Wt(kg): --     @ 07:01  -  11-15 @ 07:00  --------------------------------------------------------  IN: 480 mL / OUT: 800 mL / NET: -320 mL    11-15 @ 07:01  -   @ 07:00  --------------------------------------------------------  IN: 800 mL / OUT: 550 mL / NET: 250 mL     @ 07:01  -   @ 13:27  --------------------------------------------------------  IN: 100 mL / OUT: 180 mL / NET: -80 mL          PHYSICAL EXAM:    Constitutional: NAD  Respiratory: CTAB, no wheezes, rales or rhonchi  Cardiovascular: S1, S2, RRR  Extremities: No peripheral edema  :  No carlos.   LABS:      137  |  99  |  47<H>  ----------------------------<  89  4.8   |  25  |  2.0<H>    Ca    8.3<L>      2018 05:48  Mg     1.8                                 8.9    11.98 )-----------( 324      ( 2018 05:48 )             27.6       Urine Studies:  Urinalysis Basic - ( 2018 21:00 )    Color: Yellow / Appearance: Clear / S.015 / pH:   Gluc:  / Ketone: Negative  / Bili: Negative / Urobili: 0.2 mg/dL   Blood:  / Protein: 100 mg/dL / Nitrite: Negative   Leuk Esterase: Small / RBC:  / WBC    Sq Epi:  / Non Sq Epi: Few /HPF / Bacteria: Few /HPF        RADIOLOGY & ADDITIONAL STUDIES:

## 2018-11-16 NOTE — PROGRESS NOTE ADULT - SUBJECTIVE AND OBJECTIVE BOX
RACHEL SUMMERS  83y Female   [id number]    Procedure/Diagnosis: acute diverticulitis    Events/ 24h: Passing gas and having BM. Tolerating clears. Mild abdominal pain. No nausea or vomiting.     PAST MEDICAL & SURGICAL HISTORY:  Hypertension  Pacemaker  Chronic kidney disease  Artificial pacemaker    Vital Signs Last 24 Hrs  T(C): 36.5 (2018 00:00), Max: 36.5 (2018 00:00)  T(F): 97.7 (2018 00:00), Max: 97.7 (2018 00:00)  HR: 63 (2018 00:00) (6 - 63)  BP: 146/67 (2018 00:00) (137/63 - 146/67)  RR: 18 (2018 00:00) (18 - 18)    Diet, Clear Liquid:   No Red Dye (11-15-18 @ 14:29)    I&O's Detail    2018 07:01  -  15 Nov 2018 07:00  --------------------------------------------------------  IN:    Oral Fluid: 480 mL  Total IN: 480 mL    OUT:    Voided: 800 mL  Total OUT: 800 mL    Total NET: -320 mL    15 Nov 2018 07:01  -  2018 01:01  --------------------------------------------------------  IN:    dextrose 5% + sodium chloride 0.9%.: 210 mL    Oral Fluid: 590 mL  Total IN: 800 mL    OUT:    Voided: 550 mL  Total OUT: 550 mL    Total NET: 250 mL    MEDICATIONS  (STANDING):  atorvastatin 40 milliGRAM(s) Oral at bedtime  calcitriol   Capsule 0.25 MICROGram(s) Oral daily  ciprofloxacin   IVPB      ciprofloxacin   IVPB 400 milliGRAM(s) IV Intermittent daily  dextrose 5% + sodium chloride 0.9%. 1000 milliLiter(s) (30 mL/Hr) IV Continuous <Continuous>  ferrous    sulfate 325 milliGRAM(s) Oral daily  folic acid 1 milliGRAM(s) Oral daily  heparin  Injectable 5000 Unit(s) SubCutaneous every 8 hours  influenza   Vaccine 0.5 milliLiter(s) IntraMuscular once  insulin regular  human recombinant. 10 Unit(s) IV Push once  latanoprost 0.005% Ophthalmic Solution 1 Drop(s) Both EYES at bedtime  lidocaine   Patch 1 Patch Transdermal daily  metoprolol tartrate 50 milliGRAM(s) Oral two times a day  metroNIDAZOLE  IVPB 500 milliGRAM(s) IV Intermittent every 8 hours  metroNIDAZOLE  IVPB      pantoprazole    Tablet 40 milliGRAM(s) Oral before breakfast  sevelamer hydrochloride 800 milliGRAM(s) Oral three times a day with meals  simethicone 80 milliGRAM(s) Chew three times a day    MEDICATIONS  (PRN):  acetaminophen   Tablet .. 650 milliGRAM(s) Oral every 6 hours PRN Temp greater or equal to 38C (100.4F), Mild Pain (1 - 3)    Physical Examination:  General Appearance: NAD  HEENT: EOMI, sclera non-icteric.  Heart: RRR   Lungs: CTABL.   Abdomen:  Soft, nondistended. Mild left Lower abdominal area tenderness.   Extremities: Warm & well-perfused  Skin: Warm, dry. No jaundice.     LABS:                      9.0    16.28 )-----------( 306      ( 15 Nov 2018 06:45 )             27.5        11-15    133<L>  |  95<L>  |  57<H>  ----------------------------<  124<H>  4.2   |  23  |  2.2<H>    Ca    8.4<L>      15 Nov 2018 06:45  Mg     1.9     11-15    Urinalysis Basic - ( 2018 21:00 )    Color: Yellow / Appearance: Clear / S.015 / pH: x  Gluc: x / Ketone: Negative  / Bili: Negative / Urobili: 0.2 mg/dL   Blood: x / Protein: 100 mg/dL / Nitrite: Negative   Leuk Esterase: Small / RBC: x / WBC x   Sq Epi: x / Non Sq Epi: Few /HPF / Bacteria: Few /HPF    IMAGING:  none

## 2018-11-16 NOTE — PROGRESS NOTE ADULT - SUBJECTIVE AND OBJECTIVE BOX
Hospital Day:  12d    Subjective:  Yesterday the patient's diet was advanced to thins but the patient did not tolerate them. The patient has since been placed back on clear liquids. The patient was seen and examined at bedside. She was     Past Medical Hx:   Hypertension  Pacemaker  Chronic kidney disease    Past Sx:  Artificial pacemaker    Allergies:  Keflex (Unknown)    Current Meds:   Stand Meds:  atorvastatin 40 milliGRAM(s) Oral at bedtime  calcitriol   Capsule 0.25 MICROGram(s) Oral daily  ciprofloxacin   IVPB      ciprofloxacin   IVPB 400 milliGRAM(s) IV Intermittent daily  dextrose 5% + sodium chloride 0.9%. 1000 milliLiter(s) (30 mL/Hr) IV Continuous <Continuous>  ferrous    sulfate 325 milliGRAM(s) Oral daily  folic acid 1 milliGRAM(s) Oral daily  heparin  Injectable 5000 Unit(s) SubCutaneous every 8 hours  influenza   Vaccine 0.5 milliLiter(s) IntraMuscular once  insulin regular  human recombinant. 10 Unit(s) IV Push once  latanoprost 0.005% Ophthalmic Solution 1 Drop(s) Both EYES at bedtime  lidocaine   Patch 1 Patch Transdermal daily  metoprolol tartrate 50 milliGRAM(s) Oral two times a day  metroNIDAZOLE  IVPB 500 milliGRAM(s) IV Intermittent every 8 hours  metroNIDAZOLE  IVPB      pantoprazole    Tablet 40 milliGRAM(s) Oral before breakfast  sevelamer hydrochloride 800 milliGRAM(s) Oral three times a day with meals  simethicone 80 milliGRAM(s) Chew three times a day    PRN Meds:  acetaminophen   Tablet .. 650 milliGRAM(s) Oral every 6 hours PRN Temp greater or equal to 38C (100.4F), Mild Pain (1 - 3)    Vital Signs:   T(F): 98.2 (18 @ 07:25), Max: 98.2 (18 @ 07:25)  HR: 62 (18 @ 07:25) (62 - 63)  BP: 165/79 (18 @ 07:25) (137/63 - 165/79)  RR: 18 (18 @ 07:25) (18 - 18)  SpO2: --      11-15-18 @ 07:01  -  18 @ 07:00  --------------------------------------------------------  IN: 800 mL / OUT: 550 mL / NET: 250 mL        Physical Exam:   GENERAL: Lying in bed resting comfortably at time of examination, appearing stated age. Pleasant and conversive throughout the encounter  HEENT: NCAT, PERRLA, EOMI  CHEST/LUNG: CTA, No wheezing, rhonchi, rales  HEART: Regular rate and rhythm; s1 s2 appreciated, No murmurs, rubs, or gallops  ABDOMEN: Bowel sounds present, Mild epigastric and LLQ tenderness  EXTREMITIES: No LE edema b/l, knees bilaterally warm, swollen, and mildly tender to palpation   NERVOUS SYSTEM:  Alert & Oriented X3, Cranial nerves ii-xii grossly intact    Labs:                         8.9    11.98 )-----------( 324      ( 2018 05:48 )             27.6     Neutophil% 64.2, Lymphocyte% 20.2, Monocyte% 12.2, Bands% 2.3 18 @ 05:48    2018 05:48    137    |  99     |  47     ----------------------------<  89     4.8     |  25     |  2.0      Ca    8.3        2018 05:48  Mg     1.8       2018 05:48      Urinalysis Basic - ( 2018 21:00 )    Color: Yellow / Appearance: Clear / S.015 / pH: x  Gluc: x / Ketone: Negative  / Bili: Negative / Urobili: 0.2 mg/dL   Blood: x / Protein: 100 mg/dL / Nitrite: Negative   Leuk Esterase: Small / RBC: x / WBC x   Sq Epi: x / Non Sq Epi: Few /HPF / Bacteria: Few /HPF    Radiology:   None Today     Assessment and Plan:   This is an 83 year old female with PMHx of CKD stage 4, Recent DVT/PE provoked by travel and completed a 6month course of Eliquis, CHF s/p PPM who presented to the ED for a cough and chest pain    #Left flank and LLQ pain   - CT 11/10 interval development of sigmoid diverticulitis with no fluid collection or pneumoperitoneum   - Continue on Cipro and Flagyl   - Surgery consulted and following. Requesting medical clearance for possible surgical intervention due to concern for surgical abdomen  - Repeat CT scan done 2018; improvement in diverticulitis   - Diet increased to full liquid     #Left knee pain (resolving)   - Most likely gouty arthritis  - Bilateral Knee x-rays ordered, no acute fracture. Degenerative changes    - PT eval     #Hyperkalemia (resolved)  - Nephrology as an outpatient   - No ACE/ARB  - Monitor K if over 5.5 Insulin and D50 with EKG     #HTN (BP stable)   - Continue on current medications     #MBD  - Check Ph, and iPTH  - Calcium WNL     #Anemia  - Start on FeSO4, Check serum iron studies     #Chest pain  - ICD interrogated and no events   - ACS ruled out   - Continue on metoprolol, Lasix, and Atorvastatin    #Cough   - Unlikely PNA, seen by pulmonary     #HTN  - c/w  lopressor, Lasix    #CKD stage 4  - stable (baseline ~ 2.3 - 2.8)  - c/w calcitriol  - monitor bmp    #Urine Retention  - Complains of difficulty and pain when emptying bladder  - Morgan placed, UA and urine culture    #Loose watery Bowel Movement  - Will continue to monitor. If over 3 in 24 hours will order a C. Diff PCR     Diet: Full liquid   DVT ppx Lovenox 40mg daily   GI ppx:  Pantoprazole   Activity: ambulate as tolerated   Code Status: Full Code   Dispo: Continue to monitor; from home Hospital Day:  12d    Subjective:  Yesterday the patient's diet was advanced to thins but the patient did not tolerate them. The patient has since been placed back on clear liquids. The patient was seen and examined at bedside. She was resting comfortably but after awakening stated that she still has diffuse pain in her body. Reported that her knee pain has since resolved. Patient stated no fevers, chills, nausea, vomiting, constipation, diarrhea, weakness, fatigue, chest pain, shortness of breath    Past Medical Hx:   Hypertension  Pacemaker  Chronic kidney disease    Past Sx:  Artificial pacemaker    Allergies:  Keflex (Unknown)    Current Meds:   Standng Meds:  atorvastatin 40 milliGRAM(s) Oral at bedtime  calcitriol   Capsule 0.25 MICROGram(s) Oral daily  ciprofloxacin   IVPB      ciprofloxacin   IVPB 400 milliGRAM(s) IV Intermittent daily  dextrose 5% + sodium chloride 0.9%. 1000 milliLiter(s) (30 mL/Hr) IV Continuous <Continuous>  ferrous    sulfate 325 milliGRAM(s) Oral daily  folic acid 1 milliGRAM(s) Oral daily  heparin  Injectable 5000 Unit(s) SubCutaneous every 8 hours  influenza   Vaccine 0.5 milliLiter(s) IntraMuscular once  insulin regular  human recombinant. 10 Unit(s) IV Push once  latanoprost 0.005% Ophthalmic Solution 1 Drop(s) Both EYES at bedtime  lidocaine   Patch 1 Patch Transdermal daily  metoprolol tartrate 50 milliGRAM(s) Oral two times a day  metroNIDAZOLE  IVPB 500 milliGRAM(s) IV Intermittent every 8 hours  metroNIDAZOLE  IVPB      pantoprazole    Tablet 40 milliGRAM(s) Oral before breakfast  sevelamer hydrochloride 800 milliGRAM(s) Oral three times a day with meals  simethicone 80 milliGRAM(s) Chew three times a day    PRN Meds:  acetaminophen   Tablet .. 650 milliGRAM(s) Oral every 6 hours PRN Temp greater or equal to 38C (100.4F), Mild Pain (1 - 3)    Vital Signs:   T(F): 98.2 (18 @ 07:25), Max: 98.2 (18 @ 07:25)  HR: 62 (18 @ 07:25) (62 - 63)  BP: 165/79 (18 @ 07:25) (137/63 - 165/79)  RR: 18 (18 @ 07:25) (18 - )  SpO2: --      11-15-18 @ 07:01  -  18 @ 07:00  --------------------------------------------------------  IN: 800 mL / OUT: 550 mL / NET: 250 mL        Physical Exam:   GENERAL: Lying in bed resting comfortably at time of examination, appearing stated age. Pleasant and conversive throughout the encounter  HEENT: NCAT, PERRLA, EOMI  CHEST/LUNG: CTA, No wheezing, rhonchi, rales  HEART: Regular rate and rhythm; s1 s2 appreciated, No murmurs, rubs, or gallops  ABDOMEN: Bowel sounds present, Mild epigastric and LLQ tenderness  EXTREMITIES: No LE edema b/l, knees bilaterally warm, swollen, and mildly tender to palpation   NERVOUS SYSTEM:  Alert & Oriented X3, Cranial nerves ii-xii grossly intact    Labs:                         8.9    11.98 )-----------( 324      ( 2018 05:48 )             27.6     Neutophil% 64.2, Lymphocyte% 20.2, Monocyte% 12.2, Bands% 2.3 18 @ 05:48    2018 05:48    137    |  99     |  47     ----------------------------<  89     4.8     |  25     |  2.0      Ca    8.3        2018 05:48  Mg     1.8       2018 05:48      Urinalysis Basic - ( 2018 21:00 )    Color: Yellow / Appearance: Clear / S.015 / pH: x  Gluc: x / Ketone: Negative  / Bili: Negative / Urobili: 0.2 mg/dL   Blood: x / Protein: 100 mg/dL / Nitrite: Negative   Leuk Esterase: Small / RBC: x / WBC x   Sq Epi: x / Non Sq Epi: Few /HPF / Bacteria: Few /HPF    Radiology:   None Today     Assessment and Plan:   This is an 83 year old female with PMHx of CKD stage 4, Recent DVT/PE provoked by travel and completed a 6month course of Eliquis, CHF s/p PPM who presented to the ED for a cough and chest pain    #Left flank and LLQ pain   - CT 11/10 interval development of sigmoid diverticulitis with no fluid collection or pneumoperitoneum   - Continue on Cipro and Flagyl   - Surgery consulted and following. Requesting medical clearance for possible surgical intervention due to concern for surgical abdomen  - Repeat CT scan done 2018; improvement in diverticulitis   - Diet increased to full liquid     #Left knee pain (resolving)   - Most likely gouty arthritis  - Bilateral Knee x-rays ordered, no acute fracture. Degenerative changes    - PT eval     #Hyperkalemia (resolved)  - Nephrology as an outpatient   - No ACE/ARB  - Monitor K if over 5.5 Insulin and D50 with EKG     #HTN (BP stable)   - Continue on current medications     #MBD  - Check Ph, and iPTH  - Calcium WNL     #Anemia  - Start on FeSO4, Check serum iron studies     #Chest pain  - ICD interrogated and no events   - ACS ruled out   - Continue on metoprolol, Lasix, and Atorvastatin    #Cough   - Unlikely PNA, seen by pulmonary     #HTN  - c/w  lopressor, Lasix    #CKD stage 4  - stable (baseline ~ 2.3 - 2.8)  - c/w calcitriol  - monitor bmp    #Urine Retention  - Complains of difficulty and pain when emptying bladder  - Morgan placed, UA and urine culture    #Loose watery Bowel Movement  - Will continue to monitor. If over 3 in 24 hours will order a C. Diff PCR     Diet: Full liquid   DVT ppx Lovenox 40mg daily   GI ppx:  Pantoprazole   Activity: ambulate as tolerated   Code Status: Full Code   Dispo: Continue to monitor; from home

## 2018-11-16 NOTE — PROGRESS NOTE ADULT - ASSESSMENT
acute diverticulitis  PPM   CKD 4 - stable  hx of PE / dvt, AC course complete  OA / gout / RA    plan:    very gentle ivf  cipro / flagyl  no surgical intervention at this point  prn morphine sulfate /  percocet  oob to chair  serial abd exams  d/w resident

## 2018-11-16 NOTE — PROGRESS NOTE ADULT - SUBJECTIVE AND OBJECTIVE BOX
The patient was eating Jello and started to choke. She became anxious an SOB until able to clear her airway . The patient became HTN . She has been treated for suspected diverticulitis       PHYSICAL EXAM:  Vital Signs Last 24 Hrs  T(C): 36.7 (16 Nov 2018 16:04), Max: 36.8 (16 Nov 2018 07:25)  T(F): 98 (16 Nov 2018 16:04), Max: 98.2 (16 Nov 2018 07:25)  HR: 60 (16 Nov 2018 16:04) (60 - 63)  BP: 151/75 (16 Nov 2018 16:04) (146/67 - 165/79)  BP(mean): --  RR: 18 (16 Nov 2018 16:04) (18 - 18)  SpO2: --  Daily     Daily   I&O's Detail    15 Nov 2018 07:01  -  16 Nov 2018 07:00  --------------------------------------------------------  IN:    dextrose 5% + sodium chloride 0.9%.: 210 mL    Oral Fluid: 590 mL  Total IN: 800 mL    OUT:    Voided: 550 mL  Total OUT: 550 mL    Total NET: 250 mL      16 Nov 2018 07:01  -  16 Nov 2018 19:56  --------------------------------------------------------  IN:    Oral Fluid: 200 mL  Total IN: 200 mL    OUT:    Voided: 280 mL  Total OUT: 280 mL    Total NET: -80 mL        GENERAL: NAD, well-groomed, well-developed  HEAD:  Atraumatic, Normocephalic  NECK: Supple, No JVD, Normal thyroid  NERVOUS SYSTEM:  Alert & Oriented X3, Good concentration; Motor Strength 5/5 B/L upper and lower extremities; DTRs 2+ intact and symmetric  CHEST/LUNG: Clear   HEART: Regular rate and rhythm;   ABDOMEN: Soft, Nontender, Nondistended; Bowel sounds present  EXTREMITIES:  No edema . Pain in right great toe   LYMPH: No lymphadenopathy noted  SKIN: No rashes or lesion        LABS:                        8.9    11.98 )-----------( 324      ( 16 Nov 2018 05:48 )             27.6     11-16    137  |  99  |  47<H>  ----------------------------<  89  4.8   |  25  |  2.0<H>    Ca    8.3<L>      16 Nov 2018 05:48  Mg     1.8     11-16        MEDICATIONS  (STANDING):  atorvastatin 40 milliGRAM(s) Oral at bedtime  calcitriol   Capsule 0.25 MICROGram(s) Oral daily  ciprofloxacin   IVPB      ciprofloxacin   IVPB 400 milliGRAM(s) IV Intermittent daily  dextrose 5% + sodium chloride 0.9%. 1000 milliLiter(s) (30 mL/Hr) IV Continuous <Continuous>  ferrous    sulfate 325 milliGRAM(s) Oral daily  folic acid 1 milliGRAM(s) Oral daily  heparin  Injectable 5000 Unit(s) SubCutaneous every 8 hours  influenza   Vaccine 0.5 milliLiter(s) IntraMuscular once  insulin regular  human recombinant. 10 Unit(s) IV Push once  latanoprost 0.005% Ophthalmic Solution 1 Drop(s) Both EYES at bedtime  lidocaine   Patch 1 Patch Transdermal daily  metoprolol tartrate 50 milliGRAM(s) Oral two times a day  metroNIDAZOLE  IVPB 500 milliGRAM(s) IV Intermittent every 8 hours  metroNIDAZOLE  IVPB      pantoprazole  Injectable 40 milliGRAM(s) IV Push daily  sevelamer hydrochloride 800 milliGRAM(s) Oral three times a day with meals  simethicone 80 milliGRAM(s) Chew three times a day    MEDICATIONS  (PRN):  acetaminophen   Tablet .. 650 milliGRAM(s) Oral every 6 hours PRN Temp greater or equal to 38C (100.4F), Mild Pain (1 - 3)

## 2018-11-16 NOTE — PROGRESS NOTE ADULT - ASSESSMENT
Assessment:  83y Female patient admitted w/ acute diverticulitis. continues to have mild LLQ abdominal tenderness and pain. Tolerating full liquid diet. wbc trending down.     Plan:  Fulls, IVF  f/u cbc  Cipro/flagyl  DVT/GI ppx  OOBAT  IS  Pain control

## 2018-11-16 NOTE — PROGRESS NOTE ADULT - ASSESSMENT
Patient with CKD 4 (Baseline Cr. ~ 1.95 - 2.2) presented to hospital with chest pain and dry cough, LLQ pain, b/l knee pain, left flank pain, dysuria, urinary frequency  PMH HTN, CKD 4 (baseline cr. ~ 1.95 - 2.2), PPM.    # CKD 4/Hyperkalemia   - S. Cr. returned to her baseline.   - low K diet   - Monitor I & O, repeat phos please cont  renagel  -  repeat CXR assess PVC seen last week    # Sepsis - diverticulitis unchanged as per CT last night  WBC up to 19x, due to steroids?  monitor, on Flagyl and Cipro    # HTN   - BP noted   - add CCB    As Cr stable and at baseline, renal team will sign off please recall w/ any questions/concerns

## 2018-11-16 NOTE — PROGRESS NOTE ADULT - SUBJECTIVE AND OBJECTIVE BOX
IM COVERAGE PROGRESS NOTE    pt seen and examined  tolerating only clears  c/o abd burning  no fever  cr stable  d/w resident    PAST MEDICAL & SURGICAL HISTORY:  Hypertension  Pacemaker  Chronic kidney disease  Artificial pacemaker    Allergies:  Keflex (Unknown)    Home Medications Reviewed    SOCIAL HISTORY:  Denies ETOH,Smoking,   FAMILY HISTORY:  No pertinent family history in first degree relatives        REVIEW OF SYSTEMS:  All other review of systems is negative unless indicated above.    PHYSICAL EXAM:  NAD  awake and alert  moist mm  no jvd  b/l bs  ppm rrr  soft, nt   no cvat  no edema    Hospital Medications:   MEDICATIONS  (STANDING):  atorvastatin 40 milliGRAM(s) Oral at bedtime  calcitriol   Capsule 0.25 MICROGram(s) Oral daily  ciprofloxacin   IVPB      ciprofloxacin   IVPB 400 milliGRAM(s) IV Intermittent daily  dextrose 5% + sodium chloride 0.9%. 1000 milliLiter(s) (30 mL/Hr) IV Continuous <Continuous>  ferrous    sulfate 325 milliGRAM(s) Oral daily  folic acid 1 milliGRAM(s) Oral daily  heparin  Injectable 5000 Unit(s) SubCutaneous every 8 hours  influenza   Vaccine 0.5 milliLiter(s) IntraMuscular once  insulin regular  human recombinant. 10 Unit(s) IV Push once  latanoprost 0.005% Ophthalmic Solution 1 Drop(s) Both EYES at bedtime  lidocaine   Patch 1 Patch Transdermal daily  metoprolol tartrate 50 milliGRAM(s) Oral two times a day  metroNIDAZOLE  IVPB 500 milliGRAM(s) IV Intermittent every 8 hours  metroNIDAZOLE  IVPB      pantoprazole    Tablet 40 milliGRAM(s) Oral before breakfast  sevelamer hydrochloride 800 milliGRAM(s) Oral three times a day with meals  simethicone 80 milliGRAM(s) Chew three times a day        VITALS:  T(F): 98 (18 @ 16:04), Max: 98.2 (18 @ 07:25)  HR: 60 (18 @ 16:04)  BP: 151/75 (18 @ 16:04)  RR: 18 (18 @ 16:04)  SpO2: --  Wt(kg): --     @ 07:01  -  11-15 @ 07:00  --------------------------------------------------------  IN: 480 mL / OUT: 800 mL / NET: -320 mL    11-15 @ 07:01  -   @ 07:00  --------------------------------------------------------  IN: 800 mL / OUT: 550 mL / NET: 250 mL     @ 07:01  -   @ 17:00  --------------------------------------------------------  IN: 200 mL / OUT: 280 mL / NET: -80 mL          LABS:      137  |  99  |  47<H>  ----------------------------<  89  4.8   |  25  |  2.0<H>    Ca    8.3<L>      2018 05:48  Mg     1.8                                 8.9    11.98 )-----------( 324      ( 2018 05:48 )             27.6       Urine Studies:  Urinalysis Basic - ( 2018 21:00 )    Color: Yellow / Appearance: Clear / S.015 / pH:   Gluc:  / Ketone: Negative  / Bili: Negative / Urobili: 0.2 mg/dL   Blood:  / Protein: 100 mg/dL / Nitrite: Negative   Leuk Esterase: Small / RBC:  / WBC    Sq Epi:  / Non Sq Epi: Few /HPF / Bacteria: Few /HPF          RADIOLOGY & ADDITIONAL STUDIES:

## 2018-11-16 NOTE — PROGRESS NOTE ADULT - ASSESSMENT
1. Suspected Choking episode- was eating Jello. resolved after clearing airway . This was associated with SOB and anxiety   2, Nonischemic CM S/P ICD CRT functions normally on recent interrogation. Now with Normal LV systolic function after CRT and medications   3. Diverticulitis   4. HTN   5. Diastolic HF     Plan:   Continue current medication   The patient has diastolic HF and needs a degree of prerenal azotemia to maintain out of HF . Suspect that Lasix will have to be restarted . Please see previous notes .   Monitor BP   No need to transfer the patient to telemetry .   Iv antibiotics as per PCP   Do not give hydralazine she has had a Lupus reaction with this medication   She cannot take ACE or ARB as well.   If needed add Amlodipine for BP

## 2018-11-16 NOTE — CHART NOTE - NSCHARTNOTEFT_GEN_A_CORE
Rapid response called  Nurse was concerned that pt choked on jello. Pt BP Rapid response called  Nurse was concerned that pt choked on jello. Pt talking, not coughing, not short of breath. She was nervous. Pt /98, HR 66, 97% O2 sat on RA. Placed on NC 3 L for relief, O2 sat 100%, repeat /79. Received metoprolol 50. Told nurse to repeat BP in an hr. If BP still high, give norvasc 5.  Spoke with Dr. Celeste Rapid response called  Nurse was concerned that pt choked on jello. Pt talking, not coughing, not short of breath. She was nervous. Pt /98, HR 66, 97% O2 sat on RA. Placed on NC 3 L for relief, O2 sat 100%, repeat /79. Received metoprolol 50. Told nurse to repeat BP in an hr. If BP still high, give norvasc 5.  Spoke with Dr. Celeste, he wants to transfer her to  for rapid response, defibrillator, and hypertension. Needs cardiologist approval for . Attempted to call Dr. Mckinney but could not reach him. Dr. Celeste said to keep her in 4B for now. Rapid response called  Nurse was concerned that pt choked on jello. Pt talking, not coughing, not short of breath. She was nervous. Pt /98, HR 66, 97% O2 sat on RA. Placed on NC 3 L for relief, O2 sat 100%, repeat /79. Received metoprolol 50. Told nurse to repeat BP in an hr. If BP still high, give norvasc 5.  Spoke with Dr. Celeste, he wants to transfer her to  for rapid response, defibrillator, and hypertension. Needs cardiologist approval for . Dr. Mckinney believes pt can stay in 4B for now, no need for tele. Rapid response called  Nurse was concerned that pt choked on jello. Pt talking, not coughing, not short of breath. She was nervous. Pt /98, HR 66, 97% O2 sat on RA. Placed on NC 3 L for relief, O2 sat 100%, repeat /79. Received metoprolol 50. Told nurse to repeat BP in an hr. If BP still high, give norvasc 5.  Spoke with Dr. Celeste, he wants to transfer her to  for rapid response, hx defibrillator, and hypertension. Needs cardiologist approval for . Dr. Mckinney believes pt can stay in 4B for now, no need for tele.

## 2018-11-17 LAB
ANION GAP SERPL CALC-SCNC: 14 MMOL/L — SIGNIFICANT CHANGE UP (ref 7–14)
BASOPHILS # BLD AUTO: 0.04 K/UL — SIGNIFICANT CHANGE UP (ref 0–0.2)
BASOPHILS NFR BLD AUTO: 0.3 % — SIGNIFICANT CHANGE UP (ref 0–1)
BUN SERPL-MCNC: 32 MG/DL — HIGH (ref 10–20)
CALCIUM SERPL-MCNC: 8.5 MG/DL — SIGNIFICANT CHANGE UP (ref 8.5–10.1)
CHLORIDE SERPL-SCNC: 100 MMOL/L — SIGNIFICANT CHANGE UP (ref 98–110)
CO2 SERPL-SCNC: 24 MMOL/L — SIGNIFICANT CHANGE UP (ref 17–32)
CREAT SERPL-MCNC: 1.7 MG/DL — HIGH (ref 0.7–1.5)
EOSINOPHIL # BLD AUTO: 0.35 K/UL — SIGNIFICANT CHANGE UP (ref 0–0.7)
EOSINOPHIL NFR BLD AUTO: 3 % — SIGNIFICANT CHANGE UP (ref 0–8)
GLUCOSE SERPL-MCNC: 87 MG/DL — SIGNIFICANT CHANGE UP (ref 70–99)
HCT VFR BLD CALC: 28.6 % — LOW (ref 37–47)
HGB BLD-MCNC: 9.1 G/DL — LOW (ref 12–16)
IMM GRANULOCYTES NFR BLD AUTO: 4 % — HIGH (ref 0.1–0.3)
LYMPHOCYTES # BLD AUTO: 2.39 K/UL — SIGNIFICANT CHANGE UP (ref 1.2–3.4)
LYMPHOCYTES # BLD AUTO: 20.5 % — SIGNIFICANT CHANGE UP (ref 20.5–51.1)
MAGNESIUM SERPL-MCNC: 1.8 MG/DL — SIGNIFICANT CHANGE UP (ref 1.8–2.4)
MCHC RBC-ENTMCNC: 28.3 PG — SIGNIFICANT CHANGE UP (ref 27–31)
MCHC RBC-ENTMCNC: 31.8 G/DL — LOW (ref 32–37)
MCV RBC AUTO: 89.1 FL — SIGNIFICANT CHANGE UP (ref 81–99)
MONOCYTES # BLD AUTO: 1.19 K/UL — HIGH (ref 0.1–0.6)
MONOCYTES NFR BLD AUTO: 10.2 % — HIGH (ref 1.7–9.3)
NEUTROPHILS # BLD AUTO: 7.21 K/UL — HIGH (ref 1.4–6.5)
NEUTROPHILS NFR BLD AUTO: 62 % — SIGNIFICANT CHANGE UP (ref 42.2–75.2)
NRBC # BLD: 0 /100 WBCS — SIGNIFICANT CHANGE UP (ref 0–0)
PLATELET # BLD AUTO: 356 K/UL — SIGNIFICANT CHANGE UP (ref 130–400)
POTASSIUM SERPL-MCNC: 4.7 MMOL/L — SIGNIFICANT CHANGE UP (ref 3.5–5)
POTASSIUM SERPL-SCNC: 4.7 MMOL/L — SIGNIFICANT CHANGE UP (ref 3.5–5)
RBC # BLD: 3.21 M/UL — LOW (ref 4.2–5.4)
RBC # FLD: 13.3 % — SIGNIFICANT CHANGE UP (ref 11.5–14.5)
SODIUM SERPL-SCNC: 138 MMOL/L — SIGNIFICANT CHANGE UP (ref 135–146)
WBC # BLD: 11.64 K/UL — HIGH (ref 4.8–10.8)
WBC # FLD AUTO: 11.64 K/UL — HIGH (ref 4.8–10.8)

## 2018-11-17 RX ADMIN — HEPARIN SODIUM 5000 UNIT(S): 5000 INJECTION INTRAVENOUS; SUBCUTANEOUS at 14:56

## 2018-11-17 RX ADMIN — Medication 50 MILLIGRAM(S): at 05:41

## 2018-11-17 RX ADMIN — SEVELAMER CARBONATE 800 MILLIGRAM(S): 2400 POWDER, FOR SUSPENSION ORAL at 17:22

## 2018-11-17 RX ADMIN — LATANOPROST 1 DROP(S): 0.05 SOLUTION/ DROPS OPHTHALMIC; TOPICAL at 21:19

## 2018-11-17 RX ADMIN — SEVELAMER CARBONATE 800 MILLIGRAM(S): 2400 POWDER, FOR SUSPENSION ORAL at 14:54

## 2018-11-17 RX ADMIN — Medication 60 MILLIGRAM(S): at 18:22

## 2018-11-17 RX ADMIN — HEPARIN SODIUM 5000 UNIT(S): 5000 INJECTION INTRAVENOUS; SUBCUTANEOUS at 05:41

## 2018-11-17 RX ADMIN — SIMETHICONE 80 MILLIGRAM(S): 80 TABLET, CHEWABLE ORAL at 05:42

## 2018-11-17 RX ADMIN — PANTOPRAZOLE SODIUM 40 MILLIGRAM(S): 20 TABLET, DELAYED RELEASE ORAL at 11:08

## 2018-11-17 RX ADMIN — Medication 1 MILLIGRAM(S): at 11:08

## 2018-11-17 RX ADMIN — Medication 100 MILLIGRAM(S): at 14:55

## 2018-11-17 RX ADMIN — Medication 325 MILLIGRAM(S): at 11:08

## 2018-11-17 RX ADMIN — ATORVASTATIN CALCIUM 40 MILLIGRAM(S): 80 TABLET, FILM COATED ORAL at 21:18

## 2018-11-17 RX ADMIN — SIMETHICONE 80 MILLIGRAM(S): 80 TABLET, CHEWABLE ORAL at 14:57

## 2018-11-17 RX ADMIN — LIDOCAINE 1 PATCH: 4 CREAM TOPICAL at 22:00

## 2018-11-17 RX ADMIN — LIDOCAINE 1 PATCH: 4 CREAM TOPICAL at 11:08

## 2018-11-17 RX ADMIN — SIMETHICONE 80 MILLIGRAM(S): 80 TABLET, CHEWABLE ORAL at 21:20

## 2018-11-17 RX ADMIN — Medication 100 MILLIGRAM(S): at 06:22

## 2018-11-17 RX ADMIN — CALCITRIOL 0.25 MICROGRAM(S): 0.5 CAPSULE ORAL at 14:55

## 2018-11-17 RX ADMIN — Medication 100 MILLIGRAM(S): at 21:20

## 2018-11-17 RX ADMIN — HEPARIN SODIUM 5000 UNIT(S): 5000 INJECTION INTRAVENOUS; SUBCUTANEOUS at 21:19

## 2018-11-17 RX ADMIN — LIDOCAINE 1 PATCH: 4 CREAM TOPICAL at 20:51

## 2018-11-17 RX ADMIN — Medication 50 MILLIGRAM(S): at 17:22

## 2018-11-17 RX ADMIN — Medication 200 MILLIGRAM(S): at 11:10

## 2018-11-17 NOTE — SWALLOW BEDSIDE ASSESSMENT ADULT - SLP PERTINENT HISTORY OF CURRENT PROBLEM
Pt admitted with chest pain, SOB. Acute diverticulitis. Pt reportedly choked on jello last night, pt reports no prior h/o dysphagia

## 2018-11-17 NOTE — SWALLOW BEDSIDE ASSESSMENT ADULT - SWALLOW EVAL: DIAGNOSIS
No s/s aspiration/penetration for thin liquids. Further consistencies not trialed 2' pt cleared for clear liquids only per documentation

## 2018-11-17 NOTE — PROGRESS NOTE ADULT - SUBJECTIVE AND OBJECTIVE BOX
RACHEL SUMMERS  83y Female   [id number]    Procedure/Diagnosis: acute diverticulitis    Events/ 24h: Passing gas and having BM. choked on jello and made NPO    PAST MEDICAL & SURGICAL HISTORY:  Hypertension  Pacemaker  Chronic kidney disease  Artificial pacemaker    Vital Signs Last 24 Hrs  T(C): 36.5 (16 Nov 2018 00:00), Max: 36.5 (16 Nov 2018 00:00)  T(F): 97.7 (16 Nov 2018 00:00), Max: 97.7 (16 Nov 2018 00:00)  HR: 63 (16 Nov 2018 00:00) (6 - 63)  BP: 146/67 (16 Nov 2018 00:00) (137/63 - 146/67)  RR: 18 (16 Nov 2018 00:00) (18 - 18)    Diet, Clear Liquid:   No Red Dye (11-15-18 @ 14:29)    I&O's Detail    14 Nov 2018 07:01  -  15 Nov 2018 07:00  --------------------------------------------------------  IN:    Oral Fluid: 480 mL  Total IN: 480 mL    OUT:    Voided: 800 mL  Total OUT: 800 mL    Total NET: -320 mL    15 Nov 2018 07:01  -  16 Nov 2018 01:01  --------------------------------------------------------  IN:    dextrose 5% + sodium chloride 0.9%.: 210 mL    Oral Fluid: 590 mL  Total IN: 800 mL    OUT:    Voided: 550 mL  Total OUT: 550 mL    Total NET: 250 mL    MEDICATIONS  (STANDING):  atorvastatin 40 milliGRAM(s) Oral at bedtime  calcitriol   Capsule 0.25 MICROGram(s) Oral daily  ciprofloxacin   IVPB      ciprofloxacin   IVPB 400 milliGRAM(s) IV Intermittent daily  dextrose 5% + sodium chloride 0.9%. 1000 milliLiter(s) (30 mL/Hr) IV Continuous <Continuous>  ferrous    sulfate 325 milliGRAM(s) Oral daily  folic acid 1 milliGRAM(s) Oral daily  heparin  Injectable 5000 Unit(s) SubCutaneous every 8 hours  influenza   Vaccine 0.5 milliLiter(s) IntraMuscular once  insulin regular  human recombinant. 10 Unit(s) IV Push once  latanoprost 0.005% Ophthalmic Solution 1 Drop(s) Both EYES at bedtime  lidocaine   Patch 1 Patch Transdermal daily  metoprolol tartrate 50 milliGRAM(s) Oral two times a day  metroNIDAZOLE  IVPB 500 milliGRAM(s) IV Intermittent every 8 hours  metroNIDAZOLE  IVPB      pantoprazole    Tablet 40 milliGRAM(s) Oral before breakfast  sevelamer hydrochloride 800 milliGRAM(s) Oral three times a day with meals  simethicone 80 milliGRAM(s) Chew three times a day    MEDICATIONS  (PRN):  acetaminophen   Tablet .. 650 milliGRAM(s) Oral every 6 hours PRN Temp greater or equal to 38C (100.4F), Mild Pain (1 - 3)    Physical Examination:  General Appearance: NAD  Abdomen:  Soft, nondistended. Mild left Lower abdominal area tenderness.     Labs:  CAPILLARY BLOOD GLUCOSE      POCT Blood Glucose.: 232 mg/dL (16 Nov 2018 17:58)                          8.9    11.98 )-----------( 324      ( 16 Nov 2018 05:48 )             27.6       Auto Neutrophil %: 64.2 % (11-16-18 @ 05:48)  Auto Immature Granulocyte %: 2.3 % (11-16-18 @ 05:48)    11-16    137  |  99  |  47<H>  ----------------------------<  89  4.8   |  25  |  2.0<H>      Calcium, Total Serum: 8.3 mg/dL (11-16-18 @ 05:48)      LFTs:         Coags:            Culture - Urine (collected 14 Nov 2018 11:38)  Source: .Urine Clean Catch (Midstream)  Final Report (16 Nov 2018 05:14):    <10,000 CFU/ml    Normal Urogenital arturo present

## 2018-11-17 NOTE — PROGRESS NOTE ADULT - ASSESSMENT
acute diverticulitis  PPM   CKD 4 - stable  hx of PE / dvt, AC course complete  OA / gout / RA    plan:    very gentle ivf  cipro / flagyl  no surgical intervention at this point  prn morphine sulfate /  percocet  oob to chair  serial abd exams  d/w resident    Patient will need to be back on solumedrol for her OA/RA/GOUT.  Unable to tolerate oral nsaids due to ckd and I believe she would benifit from 1 to 2 days of IV  solumedrol which then will be d/cameron.  I anticipate patient should be clinically improved in the next 2 to 3 days where she will be able to go to an out patient rehab facility.  She prefers [facility].

## 2018-11-17 NOTE — PROGRESS NOTE ADULT - SUBJECTIVE AND OBJECTIVE BOX
LENGTH OF HOSPITAL STAY: 13d    CHIEF COMPLAINT:   Patient is a 83y old  Female who presents with a chief complaint of chest pain and SOB (17 Nov 2018 02:01)      HISTORY OF PRESENTING ILLNESS:    HPI:  83 yr old female with pmhx of HTN, CKD 4, recent DVT/PE (thought provoked by travel, completed 6mo a/c and no longer on Eliquis) CHF s/p PPM presented to ER for cough and chest pain. As per patient, she developed non productive cough for the past 2 days with no fevers. Today morning 3am she woke up with pain in the L flank and b/l knees that is 6-8/10 intermittent and she also had one episode of chest tightness centrally located, non radiating which resolved on its own. She denies any fever, chills, headache, urinary or bowel issues. No recent travel or sick contacts. (04 Nov 2018 19:50)    Patient had a rapid response yesterday following a choking incident involving Jello.  Patient was seen by speech and swallow and cleared.  Patient has pain with decreased rom in left leg.      PAST MEDICAL & SURGICAL HISTORY  PAST MEDICAL & SURGICAL HISTORY:  Hypertension  Pacemaker  Chronic kidney disease  Artificial pacemaker    SOCIAL HISTORY:    ALLERGIES:  Keflex (Unknown)    MEDICATIONS:  STANDING MEDICATIONS  atorvastatin 40 milliGRAM(s) Oral at bedtime  calcitriol   Capsule 0.25 MICROGram(s) Oral daily  ciprofloxacin   IVPB      ciprofloxacin   IVPB 400 milliGRAM(s) IV Intermittent daily  dextrose 5% + sodium chloride 0.9%. 1000 milliLiter(s) IV Continuous <Continuous>  ferrous    sulfate 325 milliGRAM(s) Oral daily  folic acid 1 milliGRAM(s) Oral daily  heparin  Injectable 5000 Unit(s) SubCutaneous every 8 hours  influenza   Vaccine 0.5 milliLiter(s) IntraMuscular once  insulin regular  human recombinant. 10 Unit(s) IV Push once  latanoprost 0.005% Ophthalmic Solution 1 Drop(s) Both EYES at bedtime  lidocaine   Patch 1 Patch Transdermal daily  metoprolol tartrate 50 milliGRAM(s) Oral two times a day  metroNIDAZOLE  IVPB 500 milliGRAM(s) IV Intermittent every 8 hours  metroNIDAZOLE  IVPB      pantoprazole  Injectable 40 milliGRAM(s) IV Push daily  sevelamer hydrochloride 800 milliGRAM(s) Oral three times a day with meals  simethicone 80 milliGRAM(s) Chew three times a day    PRN MEDICATIONS  acetaminophen   Tablet .. 650 milliGRAM(s) Oral every 6 hours PRN    VITALS:   T(F): 97.8  HR: 61  BP: 131/71  RR: 18  SpO2: --    LABS:                        9.1    11.64 )-----------( 356      ( 17 Nov 2018 06:06 )             28.6     11-17    138  |  100  |  32<H>  ----------------------------<  87  4.7   |  24  |  1.7<H>    Ca    8.5      17 Nov 2018 06:06  Mg     1.8     11-17                Culture - Urine (collected 14 Nov 2018 11:38)  Source: .Urine Clean Catch (Midstream)  Final Report (16 Nov 2018 05:14):    <10,000 CFU/ml    Normal Urogenital arturo present          RADIOLOGY:    PHYSICAL EXAM:  GEN: No acute distress  HEENT:   LUNGS: Clear to auscultation bilaterally   HEART: S1/S2 present. RRR.   ABD: Soft, non-tender, LLQ pain which is lessened  EXT: decreased rom left knee  NEURO: AAOX3

## 2018-11-17 NOTE — PROGRESS NOTE ADULT - ASSESSMENT
Assessment:  83y Female patient admitted w/ acute diverticulitis. continues to have mild LLQ abdominal tenderness and pain. Tolerating full liquid diet. wbc trending down.     Plan:  S&S evaluation due to choking episode; adv diet as tolerated   f/u cbc  Cipro/flagyl  DVT/GI ppx  OOBAT  IS  Pain control

## 2018-11-17 NOTE — PROGRESS NOTE ADULT - SUBJECTIVE AND OBJECTIVE BOX
Hospital Day:  13d    Subjective:  Last night the patient had a rapid response after a choking incident on Adrianne. She was seen by speech and     Past Medical Hx:   Hypertension  Pacemaker  Chronic kidney disease    Past Sx:  Artificial pacemaker    Allergies:  Keflex (Unknown)    Current Meds:   Stand Meds:  atorvastatin 40 milliGRAM(s) Oral at bedtime  calcitriol   Capsule 0.25 MICROGram(s) Oral daily  ciprofloxacin   IVPB      ciprofloxacin   IVPB 400 milliGRAM(s) IV Intermittent daily  dextrose 5% + sodium chloride 0.9%. 1000 milliLiter(s) (30 mL/Hr) IV Continuous <Continuous>  ferrous    sulfate 325 milliGRAM(s) Oral daily  folic acid 1 milliGRAM(s) Oral daily  heparin  Injectable 5000 Unit(s) SubCutaneous every 8 hours  influenza   Vaccine 0.5 milliLiter(s) IntraMuscular once  insulin regular  human recombinant. 10 Unit(s) IV Push once  latanoprost 0.005% Ophthalmic Solution 1 Drop(s) Both EYES at bedtime  lidocaine   Patch 1 Patch Transdermal daily  metoprolol tartrate 50 milliGRAM(s) Oral two times a day  metroNIDAZOLE  IVPB 500 milliGRAM(s) IV Intermittent every 8 hours  metroNIDAZOLE  IVPB      pantoprazole  Injectable 40 milliGRAM(s) IV Push daily  sevelamer hydrochloride 800 milliGRAM(s) Oral three times a day with meals  simethicone 80 milliGRAM(s) Chew three times a day    PRN Meds:  acetaminophen   Tablet .. 650 milliGRAM(s) Oral every 6 hours PRN Temp greater or equal to 38C (100.4F), Mild Pain (1 - 3)    Vital Signs:   T(F): 97.8 (18 @ 07:20), Max: 98.6 (18 @ 04:31)  HR: 61 (18 @ 07:20) (60 - 78)  BP: 131/71 (18 @ 07:20) (128/60 - 158/67)  RR: 18 (18 @ 07:20) (18 - 19)  SpO2: --      18 @ 07:01  -  18 @ 07:00  --------------------------------------------------------  IN: 660 mL / OUT: 480 mL / NET: 180 mL        Physical Exam:   GENERAL: Lying in bed resting comfortably at time of examination, appearing stated age. Pleasant and conversive throughout the encounter  HEENT: NCAT, PERRLA, EOMI  CHEST/LUNG: CTA, No wheezing, rhonchi, rales  HEART: Regular rate and rhythm; s1 s2 appreciated, No murmurs, rubs, or gallops  ABDOMEN: Bowel sounds present, Mild epigastric and LLQ tenderness  EXTREMITIES: No LE edema b/l, knees bilaterally warm, swollen, and mildly tender to palpation   NERVOUS SYSTEM:  Alert & Oriented X3, Cranial nerves ii-xii grossly intact    Labs:                         9.1    11.64 )-----------( 356      ( 2018 06:06 )             28.6     Neutophil% 62.0, Lymphocyte% 20.5, Monocyte% 10.2, Bands% 4.0 18 @ 06:06    2018 06:06    138    |  100    |  32     ----------------------------<  87     4.7     |  24     |  1.7      Ca    8.5        2018 06:06  Mg     1.8       2018 06:06      Urinalysis Basic - ( 2018 21:00 )    Color: Yellow / Appearance: Clear / S.015 / pH: x  Gluc: x / Ketone: Negative  / Bili: Negative / Urobili: 0.2 mg/dL   Blood: x / Protein: 100 mg/dL / Nitrite: Negative   Leuk Esterase: Small / RBC: x / WBC x   Sq Epi: x / Non Sq Epi: Few /HPF / Bacteria: Few /HPF    Radiology:   None Today    Assessment and Plan:   This is an 83 year old female with PMHx of CKD stage 4, Recent DVT/PE provoked by travel and completed a 6month course of Eliquis, CHF s/p PPM who presented to the ED for a cough and chest pain    #Left flank and LLQ pain   - CT 11/10 interval development of sigmoid diverticulitis with no fluid collection or pneumoperitoneum   - Continue on Cipro and Flagyl   - Surgery consulted and following. Requesting medical clearance for possible surgical intervention due to concern for surgical abdomen  - Repeat CT scan done 2018; improvement in diverticulitis   - Diet increased to full liquid     #Left knee pain (resolving)   - Most likely gouty arthritis  - Bilateral Knee x-rays ordered, no acute fracture. Degenerative changes    - Solumedrol 60mg q12 today, then will go to Solumedrol q24  - PT eval     #Hyperkalemia (resolved)  - Nephrology as an outpatient   - No ACE/ARB  - Monitor K if over 5.5 Insulin and D50 with EKG     #HTN (BP stable)   - Continue on current medications     #MBD  - Check Ph, and iPTH  - Calcium WNL     #Anemia  - Start on FeSO4, Check serum iron studies     #Chest pain  - ICD interrogated and no events   - ACS ruled out   - Continue on metoprolol, Lasix, and Atorvastatin    #Cough   - Unlikely PNA, seen by pulmonary     #HTN  - c/w  lopressor, Lasix    #CKD stage 4  - stable (baseline ~ 2.3 - 2.8)  - c/w calcitriol  - monitor bmp    #Urine Retention  - Complains of difficulty and pain when emptying bladder  - Morgan placed, UA and urine culture    #Loose watery Bowel Movement  - Will continue to monitor. If over 3 in 24 hours will order a C. Diff PCR     Diet: Full liquid   DVT ppx Lovenox 40mg daily   GI ppx:  Pantoprazole   Activity: ambulate as tolerated   Code Status: Full Code   Dispo: Anticipate discharge within 48-72 hours. Hospital Day:  13d    Subjective:  Last night the patient had a rapid response after a choking incident on Jello. She was seen by speech and swallow who advised no dysphagia and can resume on clear liquid diet. She reported that she is continuing to have pain in her belly as well as knee pain. Agreeable to trying Full liquid diet. No fevers, chills, nausea, vomiting, constipation, chest pain, shortness of breath.    Past Medical Hx:   Hypertension  Pacemaker  Chronic kidney disease    Past Sx:  Artificial pacemaker    Allergies:  Keflex (Unknown)    Current Meds:   Standng Meds:  atorvastatin 40 milliGRAM(s) Oral at bedtime  calcitriol   Capsule 0.25 MICROGram(s) Oral daily  ciprofloxacin   IVPB      ciprofloxacin   IVPB 400 milliGRAM(s) IV Intermittent daily  dextrose 5% + sodium chloride 0.9%. 1000 milliLiter(s) (30 mL/Hr) IV Continuous <Continuous>  ferrous    sulfate 325 milliGRAM(s) Oral daily  folic acid 1 milliGRAM(s) Oral daily  heparin  Injectable 5000 Unit(s) SubCutaneous every 8 hours  influenza   Vaccine 0.5 milliLiter(s) IntraMuscular once  insulin regular  human recombinant. 10 Unit(s) IV Push once  latanoprost 0.005% Ophthalmic Solution 1 Drop(s) Both EYES at bedtime  lidocaine   Patch 1 Patch Transdermal daily  metoprolol tartrate 50 milliGRAM(s) Oral two times a day  metroNIDAZOLE  IVPB 500 milliGRAM(s) IV Intermittent every 8 hours  metroNIDAZOLE  IVPB      pantoprazole  Injectable 40 milliGRAM(s) IV Push daily  sevelamer hydrochloride 800 milliGRAM(s) Oral three times a day with meals  simethicone 80 milliGRAM(s) Chew three times a day    PRN Meds:  acetaminophen   Tablet .. 650 milliGRAM(s) Oral every 6 hours PRN Temp greater or equal to 38C (100.4F), Mild Pain (1 - 3)    Vital Signs:   T(F): 97.8 (18 @ 07:20), Max: 98.6 (18 @ 04:31)  HR: 61 (18 @ 07:20) (60 - 78)  BP: 131/71 (18 @ 07:20) (128/60 - 158/67)  RR: 18 (18 @ 07:20) (18 - 19)  SpO2: --      11-16-18 @ 07:01  -  18 @ 07:00  --------------------------------------------------------  IN: 660 mL / OUT: 480 mL / NET: 180 mL        Physical Exam:   GENERAL: Lying in bed at time of examination, appearing stated age. Pleasant and conversive throughout the encounter  HEENT: NCAT, PERRLA, EOMI  CHEST/LUNG: CTA, No wheezing, rhonchi, rales  HEART: Regular rate and rhythm; s1 s2 appreciated, No murmurs, rubs, or gallops  ABDOMEN: Bowel sounds present, Mild epigastric and LLQ tenderness  EXTREMITIES: No LE edema b/l, knees bilaterally warm, swollen, and mildly tender to palpation   NERVOUS SYSTEM:  Alert & Oriented X3, Cranial nerves ii-xii grossly intact    Labs:                         9.1    11.64 )-----------( 356      ( 2018 06:06 )             28.6     Neutophil% 62.0, Lymphocyte% 20.5, Monocyte% 10.2, Bands% 4.0 18 @ 06:06    2018 06:06    138    |  100    |  32     ----------------------------<  87     4.7     |  24     |  1.7      Ca    8.5        2018 06:06  Mg     1.8       2018 06:06      Urinalysis Basic - ( 2018 21:00 )    Color: Yellow / Appearance: Clear / S.015 / pH: x  Gluc: x / Ketone: Negative  / Bili: Negative / Urobili: 0.2 mg/dL   Blood: x / Protein: 100 mg/dL / Nitrite: Negative   Leuk Esterase: Small / RBC: x / WBC x   Sq Epi: x / Non Sq Epi: Few /HPF / Bacteria: Few /HPF    Radiology:   None Today    Assessment and Plan:   This is an 83 year old female with PMHx of CKD stage 4, Recent DVT/PE provoked by travel and completed a 6month course of Eliquis, CHF s/p PPM who presented to the ED for a cough and chest pain    #Left flank and LLQ pain   - CT 11/10 interval development of sigmoid diverticulitis with no fluid collection or pneumoperitoneum   - Continue on Cipro and Flagyl   - Surgery consulted and following. Requesting medical clearance for possible surgical intervention due to concern for surgical abdomen  - Repeat CT scan done 2018; improvement in diverticulitis   - Diet increased to full liquid     #Left knee pain   - Most likely gouty arthritis  - Bilateral Knee x-rays ordered, no acute fracture. Degenerative changes    - Solumedrol 60mg q12 today, then will go to Solumedrol q24    #Hyperkalemia (resolved)  - Nephrology as an outpatient   - No ACE/ARB  - Monitor K if over 5.5 Insulin and D50 with EKG     #HTN (BP stable)   - Continue on current medications     #MBD  - Check Ph, and iPTH  - Calcium WNL     #Anemia  - Start on FeSO4, Check serum iron studies     #Chest pain  - ICD interrogated and no events   - ACS ruled out   - Continue on metoprolol, Lasix, and Atorvastatin    #Cough   - Unlikely PNA, seen by pulmonary     #HTN  - c/w  lopressor, Lasix    #CKD stage 4  - stable (baseline ~ 2.3 - 2.8)  - c/w calcitriol  - monitor bmp    #Urine Retention  - Complains of difficulty and pain when emptying bladder  - Morgan placed, UA and urine culture    #Loose watery Bowel Movement  - Will continue to monitor. If over 3 in 24 hours will order a C. Diff PCR     Diet: Full liquid   DVT ppx Lovenox 40mg daily   GI ppx:  Pantoprazole   Activity: ambulate as tolerated   Code Status: Full Code   Dispo: Anticipate discharge within 48-72 hours.

## 2018-11-18 LAB
ANION GAP SERPL CALC-SCNC: 22 MMOL/L — HIGH (ref 7–14)
APPEARANCE UR: ABNORMAL
BACTERIA # UR AUTO: ABNORMAL /HPF
BASOPHILS # BLD AUTO: 0.03 K/UL — SIGNIFICANT CHANGE UP (ref 0–0.2)
BASOPHILS NFR BLD AUTO: 0.2 % — SIGNIFICANT CHANGE UP (ref 0–1)
BILIRUB UR-MCNC: ABNORMAL
BUN SERPL-MCNC: 26 MG/DL — HIGH (ref 10–20)
C DIFF BY PCR RESULT: NEGATIVE — SIGNIFICANT CHANGE UP
C DIFF TOX GENS STL QL NAA+PROBE: SIGNIFICANT CHANGE UP
CALCIUM SERPL-MCNC: 8.5 MG/DL — SIGNIFICANT CHANGE UP (ref 8.5–10.1)
CHLORIDE SERPL-SCNC: 98 MMOL/L — SIGNIFICANT CHANGE UP (ref 98–110)
CO2 SERPL-SCNC: 16 MMOL/L — LOW (ref 17–32)
COLOR SPEC: ABNORMAL
CREAT SERPL-MCNC: 1.6 MG/DL — HIGH (ref 0.7–1.5)
DIFF PNL FLD: NEGATIVE — SIGNIFICANT CHANGE UP
EOSINOPHIL # BLD AUTO: 0.01 K/UL — SIGNIFICANT CHANGE UP (ref 0–0.7)
EOSINOPHIL NFR BLD AUTO: 0.1 % — SIGNIFICANT CHANGE UP (ref 0–8)
EPI CELLS # UR: ABNORMAL /HPF
GLUCOSE SERPL-MCNC: 177 MG/DL — HIGH (ref 70–99)
GLUCOSE UR QL: 100 MG/DL
HCT VFR BLD CALC: 32.1 % — LOW (ref 37–47)
HGB BLD-MCNC: 10.1 G/DL — LOW (ref 12–16)
IMM GRANULOCYTES NFR BLD AUTO: 3.1 % — HIGH (ref 0.1–0.3)
KETONES UR-MCNC: ABNORMAL
LEUKOCYTE ESTERASE UR-ACNC: ABNORMAL
LYMPHOCYTES # BLD AUTO: 1.17 K/UL — LOW (ref 1.2–3.4)
LYMPHOCYTES # BLD AUTO: 8.1 % — LOW (ref 20.5–51.1)
MAGNESIUM SERPL-MCNC: 1.7 MG/DL — LOW (ref 1.8–2.4)
MCHC RBC-ENTMCNC: 28.8 PG — SIGNIFICANT CHANGE UP (ref 27–31)
MCHC RBC-ENTMCNC: 31.5 G/DL — LOW (ref 32–37)
MCV RBC AUTO: 91.5 FL — SIGNIFICANT CHANGE UP (ref 81–99)
MONOCYTES # BLD AUTO: 0.2 K/UL — SIGNIFICANT CHANGE UP (ref 0.1–0.6)
MONOCYTES NFR BLD AUTO: 1.4 % — LOW (ref 1.7–9.3)
NEUTROPHILS # BLD AUTO: 12.63 K/UL — HIGH (ref 1.4–6.5)
NEUTROPHILS NFR BLD AUTO: 87.1 % — HIGH (ref 42.2–75.2)
NITRITE UR-MCNC: POSITIVE
NRBC # BLD: 0 /100 WBCS — SIGNIFICANT CHANGE UP (ref 0–0)
PH UR: 5.5 — SIGNIFICANT CHANGE UP (ref 5–8)
PLATELET # BLD AUTO: 338 K/UL — SIGNIFICANT CHANGE UP (ref 130–400)
POTASSIUM SERPL-MCNC: 5.1 MMOL/L — HIGH (ref 3.5–5)
POTASSIUM SERPL-SCNC: 5.1 MMOL/L — HIGH (ref 3.5–5)
PROT UR-MCNC: >=300 MG/DL
RBC # BLD: 3.51 M/UL — LOW (ref 4.2–5.4)
RBC # FLD: 13.3 % — SIGNIFICANT CHANGE UP (ref 11.5–14.5)
SODIUM SERPL-SCNC: 136 MMOL/L — SIGNIFICANT CHANGE UP (ref 135–146)
SP GR SPEC: 1.02 — SIGNIFICANT CHANGE UP (ref 1.01–1.03)
UROBILINOGEN FLD QL: 0.2 MG/DL — SIGNIFICANT CHANGE UP (ref 0.2–0.2)
WBC # BLD: 14.49 K/UL — HIGH (ref 4.8–10.8)
WBC # FLD AUTO: 14.49 K/UL — HIGH (ref 4.8–10.8)
WBC UR QL: SIGNIFICANT CHANGE UP /HPF

## 2018-11-18 RX ORDER — TRAMADOL HYDROCHLORIDE 50 MG/1
25 TABLET ORAL EVERY 6 HOURS
Qty: 0 | Refills: 0 | Status: DISCONTINUED | OUTPATIENT
Start: 2018-11-18 | End: 2018-11-20

## 2018-11-18 RX ORDER — ONDANSETRON 8 MG/1
4 TABLET, FILM COATED ORAL EVERY 8 HOURS
Qty: 0 | Refills: 0 | Status: DISCONTINUED | OUTPATIENT
Start: 2018-11-18 | End: 2018-11-20

## 2018-11-18 RX ORDER — VANCOMYCIN HCL 1 G
125 VIAL (EA) INTRAVENOUS EVERY 6 HOURS
Qty: 0 | Refills: 0 | Status: DISCONTINUED | OUTPATIENT
Start: 2018-11-18 | End: 2018-11-19

## 2018-11-18 RX ADMIN — TRAMADOL HYDROCHLORIDE 25 MILLIGRAM(S): 50 TABLET ORAL at 03:30

## 2018-11-18 RX ADMIN — CALCITRIOL 0.25 MICROGRAM(S): 0.5 CAPSULE ORAL at 11:16

## 2018-11-18 RX ADMIN — SEVELAMER CARBONATE 800 MILLIGRAM(S): 2400 POWDER, FOR SUSPENSION ORAL at 12:27

## 2018-11-18 RX ADMIN — Medication 650 MILLIGRAM(S): at 07:56

## 2018-11-18 RX ADMIN — Medication 1 MILLIGRAM(S): at 11:15

## 2018-11-18 RX ADMIN — Medication 50 MILLIGRAM(S): at 05:08

## 2018-11-18 RX ADMIN — Medication 60 MILLIGRAM(S): at 05:05

## 2018-11-18 RX ADMIN — LIDOCAINE 1 PATCH: 4 CREAM TOPICAL at 19:50

## 2018-11-18 RX ADMIN — LIDOCAINE 1 PATCH: 4 CREAM TOPICAL at 11:15

## 2018-11-18 RX ADMIN — SIMETHICONE 80 MILLIGRAM(S): 80 TABLET, CHEWABLE ORAL at 23:43

## 2018-11-18 RX ADMIN — SIMETHICONE 80 MILLIGRAM(S): 80 TABLET, CHEWABLE ORAL at 14:18

## 2018-11-18 RX ADMIN — SEVELAMER CARBONATE 800 MILLIGRAM(S): 2400 POWDER, FOR SUSPENSION ORAL at 07:59

## 2018-11-18 RX ADMIN — Medication 200 MILLIGRAM(S): at 11:16

## 2018-11-18 RX ADMIN — HEPARIN SODIUM 5000 UNIT(S): 5000 INJECTION INTRAVENOUS; SUBCUTANEOUS at 05:05

## 2018-11-18 RX ADMIN — TRAMADOL HYDROCHLORIDE 25 MILLIGRAM(S): 50 TABLET ORAL at 16:50

## 2018-11-18 RX ADMIN — TRAMADOL HYDROCHLORIDE 25 MILLIGRAM(S): 50 TABLET ORAL at 02:36

## 2018-11-18 RX ADMIN — HEPARIN SODIUM 5000 UNIT(S): 5000 INJECTION INTRAVENOUS; SUBCUTANEOUS at 21:14

## 2018-11-18 RX ADMIN — SIMETHICONE 80 MILLIGRAM(S): 80 TABLET, CHEWABLE ORAL at 05:09

## 2018-11-18 RX ADMIN — Medication 325 MILLIGRAM(S): at 11:15

## 2018-11-18 RX ADMIN — PANTOPRAZOLE SODIUM 40 MILLIGRAM(S): 20 TABLET, DELAYED RELEASE ORAL at 11:15

## 2018-11-18 RX ADMIN — LIDOCAINE 1 PATCH: 4 CREAM TOPICAL at 23:50

## 2018-11-18 RX ADMIN — Medication 125 MILLIGRAM(S): at 11:16

## 2018-11-18 RX ADMIN — Medication 100 MILLIGRAM(S): at 23:43

## 2018-11-18 RX ADMIN — Medication 100 MILLIGRAM(S): at 05:08

## 2018-11-18 RX ADMIN — Medication 100 MILLIGRAM(S): at 14:18

## 2018-11-18 RX ADMIN — SEVELAMER CARBONATE 800 MILLIGRAM(S): 2400 POWDER, FOR SUSPENSION ORAL at 17:11

## 2018-11-18 RX ADMIN — LATANOPROST 1 DROP(S): 0.05 SOLUTION/ DROPS OPHTHALMIC; TOPICAL at 21:14

## 2018-11-18 RX ADMIN — Medication 50 MILLIGRAM(S): at 17:12

## 2018-11-18 RX ADMIN — ATORVASTATIN CALCIUM 40 MILLIGRAM(S): 80 TABLET, FILM COATED ORAL at 21:14

## 2018-11-18 RX ADMIN — HEPARIN SODIUM 5000 UNIT(S): 5000 INJECTION INTRAVENOUS; SUBCUTANEOUS at 14:18

## 2018-11-18 NOTE — PROGRESS NOTE ADULT - ATTENDING COMMENTS
above noted abdomen sofct tender LLQ discussed case with DR DALAL
above noted abdomen soft tenderness    less discussed case with DR DALAL
above noted abdomen soft tenderness less
PT seen and examined  above note reviewed, agree with findings  case discussed w/ fellow    Cr back to baseline
above noted abdomen soft
above noted abdomen soft no distension
above notedv abdomen soft mild tenderness LLQ
above noted abdomen soft mild tenderness LLQ

## 2018-11-18 NOTE — PROGRESS NOTE ADULT - SUBJECTIVE AND OBJECTIVE BOX
LENGTH OF HOSPITAL STAY: 14d    CHIEF COMPLAINT:   Patient is a 83y old  Female who presents with a chief complaint of chest pain and SOB (18 Nov 2018 06:03)      HISTORY OF PRESENTING ILLNESS:    HPI:  83 yr old female with pmhx of HTN, CKD 4, recent DVT/PE (thought provoked by travel, completed 6mo a/c and no longer on Eliquis) CHF s/p PPM presented to ER for cough and chest pain. As per patient, she developed non productive cough for the past 2 days with no fevers. Today morning 3am she woke up with pain in the L flank and b/l knees that is 6-8/10 intermittent and she also had one episode of chest tightness centrally located, non radiating which resolved on its own. She denies any fever, chills, headache, urinary or bowel issues. No recent travel or sick contacts. (04 Nov 2018 19:50)      PATIENT HAS BEEN HAVING FREQ. LOOSE BM. ON C-DIF Precautions           PAST MEDICAL & SURGICAL HISTORY  PAST MEDICAL & SURGICAL HISTORY:  Hypertension  Pacemaker  Chronic kidney disease  Artificial pacemaker    SOCIAL HISTORY:    ALLERGIES:  Keflex (Unknown)    MEDICATIONS:  STANDING MEDICATIONS  atorvastatin 40 milliGRAM(s) Oral at bedtime  calcitriol   Capsule 0.25 MICROGram(s) Oral daily  ciprofloxacin   IVPB      ciprofloxacin   IVPB 400 milliGRAM(s) IV Intermittent daily  dextrose 5% + sodium chloride 0.9%. 1000 milliLiter(s) IV Continuous <Continuous>  ferrous    sulfate 325 milliGRAM(s) Oral daily  folic acid 1 milliGRAM(s) Oral daily  heparin  Injectable 5000 Unit(s) SubCutaneous every 8 hours  influenza   Vaccine 0.5 milliLiter(s) IntraMuscular once  insulin regular  human recombinant. 10 Unit(s) IV Push once  latanoprost 0.005% Ophthalmic Solution 1 Drop(s) Both EYES at bedtime  lidocaine   Patch 1 Patch Transdermal daily  methylPREDNISolone sodium succinate Injectable 60 milliGRAM(s) IV Push daily  metoprolol tartrate 50 milliGRAM(s) Oral two times a day  metroNIDAZOLE  IVPB 500 milliGRAM(s) IV Intermittent every 8 hours  metroNIDAZOLE  IVPB      pantoprazole  Injectable 40 milliGRAM(s) IV Push daily  sevelamer hydrochloride 800 milliGRAM(s) Oral three times a day with meals  simethicone 80 milliGRAM(s) Chew three times a day  vancomycin    Solution 125 milliGRAM(s) Oral every 6 hours    PRN MEDICATIONS  acetaminophen   Tablet .. 650 milliGRAM(s) Oral every 6 hours PRN  ondansetron Injectable 4 milliGRAM(s) IV Push every 8 hours PRN  traMADol 25 milliGRAM(s) Oral every 6 hours PRN    VITALS:   T(F): 97.2  HR: 60  BP: 171/83  RR: 18  SpO2: --    LABS:                        10.1   14.49 )-----------( 338      ( 18 Nov 2018 06:51 )             32.1     11-17    138  |  100  |  32<H>  ----------------------------<  87  4.7   |  24  |  1.7<H>    Ca    8.5      17 Nov 2018 06:06  Mg     1.8     11-17                    RADIOLOGY:    PHYSICAL EXAM:  GEN: No acute distress  HEENT:   LUNGS: Clear to auscultation bilaterally   HEART: S1/S2 present. RRR.   ABD: Soft, non-tender, dec tenderness  EXT: improved rom left knee  NEURO: AAOX3

## 2018-11-18 NOTE — PROGRESS NOTE ADULT - ASSESSMENT
acute diverticulitis  PPM   CKD 4 - stable  hx of PE / dvt, AC course complete  OA / gout / RA    plan:    very gentle ivf  cipro / flagyl  no surgical intervention at this point  prn morphine sulfate /  percocet  oob to chair  serial abd exams  d/w resident    Patient had Cdif collection today and was placed on Vanco empirically pending results.    Will d/c solumedrol and restart if left knee pain gets worse

## 2018-11-18 NOTE — PROGRESS NOTE ADULT - SUBJECTIVE AND OBJECTIVE BOX
Progress Note: General Surgery  Patient: RACHEL SUMMERS , 83y (1935)Female   MRN: [id number]  Location: [address]  Visit: 11-04-18 Inpatient  Date: 11-18-18 @ 06:03  Hospital Day: 9    Procedure/Diagnosis: Acute diverticulitis  Events over 24h: Evaluated by speech and swallow, tolerating clears diet. No active complaints.     Vitals: T(F): 98.7 (11-18-18 @ 00:00), Max: 98.7 (11-18-18 @ 00:00)  HR: 68 (11-18-18 @ 00:00)  BP: 160/74 (11-18-18 @ 00:00) (131/71 - 177/81)  RR: 18 (11-18-18 @ 00:00)  SpO2: --    In:   11-16-18 @ 07:01  -  11-17-18 @ 07:00  --------------------------------------------------------  IN: 660 mL    11-17-18 @ 07:01  -  11-18-18 @ 06:03  --------------------------------------------------------  IN: 120 mL      Out:   11-16-18 @ 07:01  -  11-17-18 @ 07:00  --------------------------------------------------------  OUT:    Voided: 480 mL  Total OUT: 480 mL      11-17-18 @ 07:01  -  11-18-18 @ 06:03  --------------------------------------------------------  OUT:    Voided: 200 mL  Total OUT: 200 mL        Net:   11-16-18 @ 07:01  -  11-17-18 @ 07:00  --------------------------------------------------------  NET: 180 mL    11-17-18 @ 07:01  -  11-18-18 @ 06:03  --------------------------------------------------------  NET: -80 mL      Diet: Diet, NPO (11-16-18 @ 18:05)    IV Fluids: yes , Type: calcitriol   Capsule 0.25 MICROGram(s) Oral daily  dextrose 5% + sodium chloride 0.9%. 1000 milliLiter(s) (30 mL/Hr) IV Continuous <Continuous>  ferrous    sulfate 325 milliGRAM(s) Oral daily  folic acid 1 milliGRAM(s) Oral daily    Physical Examination:  General Appearance: NAD, alert and cooperative  HEENT: NCAT, WNL  Heart: S1 and S2. No murmurs. RRR  Lungs: Clear to auscultation BL without rales, rhonchi, wheezing, crackles or diminished breath sounds.  Abdomen: Soft, nondistended      Medications: [Standing]  atorvastatin 40 milliGRAM(s) Oral at bedtime  calcitriol   Capsule 0.25 MICROGram(s) Oral daily  ciprofloxacin   IVPB      ciprofloxacin   IVPB 400 milliGRAM(s) IV Intermittent daily  dextrose 5% + sodium chloride 0.9%. 1000 milliLiter(s) (30 mL/Hr) IV Continuous <Continuous>  ferrous    sulfate 325 milliGRAM(s) Oral daily  folic acid 1 milliGRAM(s) Oral daily  heparin  Injectable 5000 Unit(s) SubCutaneous every 8 hours  influenza   Vaccine 0.5 milliLiter(s) IntraMuscular once  insulin regular  human recombinant. 10 Unit(s) IV Push once  latanoprost 0.005% Ophthalmic Solution 1 Drop(s) Both EYES at bedtime  lidocaine   Patch 1 Patch Transdermal daily  methylPREDNISolone sodium succinate Injectable 60 milliGRAM(s) IV Push daily  metoprolol tartrate 50 milliGRAM(s) Oral two times a day  metroNIDAZOLE  IVPB 500 milliGRAM(s) IV Intermittent every 8 hours  metroNIDAZOLE  IVPB      pantoprazole  Injectable 40 milliGRAM(s) IV Push daily  sevelamer hydrochloride 800 milliGRAM(s) Oral three times a day with meals  simethicone 80 milliGRAM(s) Chew three times a day    DVT Prophylaxis: heparin  Injectable 5000 Unit(s) SubCutaneous every 8 hours    GI Prophylaxis: pantoprazole  Injectable 40 milliGRAM(s) IV Push daily    Antibiotics: ciprofloxacin   IVPB      ciprofloxacin   IVPB 400 milliGRAM(s) IV Intermittent daily  metroNIDAZOLE  IVPB 500 milliGRAM(s) IV Intermittent every 8 hours  metroNIDAZOLE  IVPB        Anticoagulation:   Medications:[PRN]  acetaminophen   Tablet .. 650 milliGRAM(s) Oral every 6 hours PRN  ondansetron Injectable 4 milliGRAM(s) IV Push every 8 hours PRN  traMADol 25 milliGRAM(s) Oral every 6 hours PRN    Labs:                        9.1    11.64 )-----------( 356      ( 17 Nov 2018 06:06 )             28.6     11-17    138  |  100  |  32<H>  ----------------------------<  87  4.7   |  24  |  1.7<H>    Ca    8.5      17 Nov 2018 06:06  Mg     1.8     11-17      Assessment:  83y Female patient admitted for acute diverticulitis    Plan:  Diet clears, tolerating  Passed speech and swallow  Antibiotics  Ambulate  Encourage incentive spirometer use    Date/Time: 11-18-18 @ 06:03 Progress Note: General Surgery  Patient: RACHEL SUMMERS , 83y (1935)Female   MRN: [id number]  Location: [address]  Visit: 11-04-18 Inpatient  Date: 11-18-18 @ 06:03  Hospital Day: 9    Procedure/Diagnosis: Acute diverticulitis  Events over 24h: Evaluated by speech and swallow, tolerating clears diet. Pt is c/o flank pain.    Vitals: T(F): 98.7 (11-18-18 @ 00:00), Max: 98.7 (11-18-18 @ 00:00)  HR: 68 (11-18-18 @ 00:00)  BP: 160/74 (11-18-18 @ 00:00) (131/71 - 177/81)  RR: 18 (11-18-18 @ 00:00)  SpO2: --    In:   11-16-18 @ 07:01  -  11-17-18 @ 07:00  --------------------------------------------------------  IN: 660 mL    11-17-18 @ 07:01  -  11-18-18 @ 06:03  --------------------------------------------------------  IN: 120 mL      Out:   11-16-18 @ 07:01  -  11-17-18 @ 07:00  --------------------------------------------------------  OUT:    Voided: 480 mL  Total OUT: 480 mL      11-17-18 @ 07:01  -  11-18-18 @ 06:03  --------------------------------------------------------  OUT:    Voided: 200 mL  Total OUT: 200 mL        Net:   11-16-18 @ 07:01  -  11-17-18 @ 07:00  --------------------------------------------------------  NET: 180 mL    11-17-18 @ 07:01  -  11-18-18 @ 06:03  --------------------------------------------------------  NET: -80 mL      Diet: Diet, NPO (11-16-18 @ 18:05)    IV Fluids: yes , Type: calcitriol   Capsule 0.25 MICROGram(s) Oral daily  dextrose 5% + sodium chloride 0.9%. 1000 milliLiter(s) (30 mL/Hr) IV Continuous <Continuous>  ferrous    sulfate 325 milliGRAM(s) Oral daily  folic acid 1 milliGRAM(s) Oral daily    Physical Examination:  General Appearance: NAD, alert and cooperative  HEENT: NCAT, WNL  Heart: S1 and S2. No murmurs. RRR  Lungs: Clear to auscultation BL without rales, rhonchi, wheezing, crackles or diminished breath sounds.  Abdomen: Soft, nondistended      Medications: [Standing]  atorvastatin 40 milliGRAM(s) Oral at bedtime  calcitriol   Capsule 0.25 MICROGram(s) Oral daily  ciprofloxacin   IVPB      ciprofloxacin   IVPB 400 milliGRAM(s) IV Intermittent daily  dextrose 5% + sodium chloride 0.9%. 1000 milliLiter(s) (30 mL/Hr) IV Continuous <Continuous>  ferrous    sulfate 325 milliGRAM(s) Oral daily  folic acid 1 milliGRAM(s) Oral daily  heparin  Injectable 5000 Unit(s) SubCutaneous every 8 hours  influenza   Vaccine 0.5 milliLiter(s) IntraMuscular once  insulin regular  human recombinant. 10 Unit(s) IV Push once  latanoprost 0.005% Ophthalmic Solution 1 Drop(s) Both EYES at bedtime  lidocaine   Patch 1 Patch Transdermal daily  methylPREDNISolone sodium succinate Injectable 60 milliGRAM(s) IV Push daily  metoprolol tartrate 50 milliGRAM(s) Oral two times a day  metroNIDAZOLE  IVPB 500 milliGRAM(s) IV Intermittent every 8 hours  metroNIDAZOLE  IVPB      pantoprazole  Injectable 40 milliGRAM(s) IV Push daily  sevelamer hydrochloride 800 milliGRAM(s) Oral three times a day with meals  simethicone 80 milliGRAM(s) Chew three times a day    DVT Prophylaxis: heparin  Injectable 5000 Unit(s) SubCutaneous every 8 hours    GI Prophylaxis: pantoprazole  Injectable 40 milliGRAM(s) IV Push daily    Antibiotics: ciprofloxacin   IVPB      ciprofloxacin   IVPB 400 milliGRAM(s) IV Intermittent daily  metroNIDAZOLE  IVPB 500 milliGRAM(s) IV Intermittent every 8 hours  metroNIDAZOLE  IVPB        Anticoagulation:   Medications:[PRN]  acetaminophen   Tablet .. 650 milliGRAM(s) Oral every 6 hours PRN  ondansetron Injectable 4 milliGRAM(s) IV Push every 8 hours PRN  traMADol 25 milliGRAM(s) Oral every 6 hours PRN    Labs:                        9.1    11.64 )-----------( 356      ( 17 Nov 2018 06:06 )             28.6     11-17    138  |  100  |  32<H>  ----------------------------<  87  4.7   |  24  |  1.7<H>    Ca    8.5      17 Nov 2018 06:06  Mg     1.8     11-17

## 2018-11-18 NOTE — PROGRESS NOTE ADULT - ASSESSMENT
Assessment:  83y Female patient admitted for acute diverticulitis    Plan:    Diet clears, tolerating  Passed speech and swallow  Antibiotics  Ambulate  Encourage incentive spirometer use  Ordered and f/u Urine analysis and urine culture  F/U USG Abdomen to rule out renal hydronephrosis    Date/Time: 11-18-18 @ 06:03

## 2018-11-19 LAB
ANION GAP SERPL CALC-SCNC: 17 MMOL/L — HIGH (ref 7–14)
BASOPHILS # BLD AUTO: 0.04 K/UL — SIGNIFICANT CHANGE UP (ref 0–0.2)
BASOPHILS NFR BLD AUTO: 0.2 % — SIGNIFICANT CHANGE UP (ref 0–1)
BUN SERPL-MCNC: 26 MG/DL — HIGH (ref 10–20)
CALCIUM SERPL-MCNC: 8.5 MG/DL — SIGNIFICANT CHANGE UP (ref 8.5–10.1)
CHLORIDE SERPL-SCNC: 99 MMOL/L — SIGNIFICANT CHANGE UP (ref 98–110)
CO2 SERPL-SCNC: 17 MMOL/L — SIGNIFICANT CHANGE UP (ref 17–32)
CREAT SERPL-MCNC: 1.5 MG/DL — SIGNIFICANT CHANGE UP (ref 0.7–1.5)
EOSINOPHIL # BLD AUTO: 0.07 K/UL — SIGNIFICANT CHANGE UP (ref 0–0.7)
EOSINOPHIL NFR BLD AUTO: 0.4 % — SIGNIFICANT CHANGE UP (ref 0–8)
GLUCOSE SERPL-MCNC: 105 MG/DL — HIGH (ref 70–99)
HCT VFR BLD CALC: 28.5 % — LOW (ref 37–47)
HGB BLD-MCNC: 8.9 G/DL — LOW (ref 12–16)
IMM GRANULOCYTES NFR BLD AUTO: 3.4 % — HIGH (ref 0.1–0.3)
LYMPHOCYTES # BLD AUTO: 12.3 % — LOW (ref 20.5–51.1)
LYMPHOCYTES # BLD AUTO: 2.42 K/UL — SIGNIFICANT CHANGE UP (ref 1.2–3.4)
MAGNESIUM SERPL-MCNC: 1.6 MG/DL — LOW (ref 1.8–2.4)
MAGNESIUM SERPL-MCNC: 1.8 MG/DL — SIGNIFICANT CHANGE UP (ref 1.8–2.4)
MCHC RBC-ENTMCNC: 28.5 PG — SIGNIFICANT CHANGE UP (ref 27–31)
MCHC RBC-ENTMCNC: 31.2 G/DL — LOW (ref 32–37)
MCV RBC AUTO: 91.3 FL — SIGNIFICANT CHANGE UP (ref 81–99)
MONOCYTES # BLD AUTO: 1.66 K/UL — HIGH (ref 0.1–0.6)
MONOCYTES NFR BLD AUTO: 8.4 % — SIGNIFICANT CHANGE UP (ref 1.7–9.3)
NEUTROPHILS # BLD AUTO: 14.87 K/UL — HIGH (ref 1.4–6.5)
NEUTROPHILS NFR BLD AUTO: 75.3 % — HIGH (ref 42.2–75.2)
NRBC # BLD: 0 /100 WBCS — SIGNIFICANT CHANGE UP (ref 0–0)
PLATELET # BLD AUTO: 394 K/UL — SIGNIFICANT CHANGE UP (ref 130–400)
POTASSIUM SERPL-MCNC: 4.7 MMOL/L — SIGNIFICANT CHANGE UP (ref 3.5–5)
POTASSIUM SERPL-SCNC: 4.7 MMOL/L — SIGNIFICANT CHANGE UP (ref 3.5–5)
RBC # BLD: 3.12 M/UL — LOW (ref 4.2–5.4)
RBC # FLD: 13.7 % — SIGNIFICANT CHANGE UP (ref 11.5–14.5)
SODIUM SERPL-SCNC: 133 MMOL/L — LOW (ref 135–146)
WBC # BLD: 19.73 K/UL — HIGH (ref 4.8–10.8)
WBC # FLD AUTO: 19.73 K/UL — HIGH (ref 4.8–10.8)

## 2018-11-19 RX ORDER — MAGNESIUM SULFATE 500 MG/ML
2 VIAL (ML) INJECTION ONCE
Qty: 0 | Refills: 0 | Status: COMPLETED | OUTPATIENT
Start: 2018-11-19 | End: 2018-11-19

## 2018-11-19 RX ADMIN — Medication 50 MILLIGRAM(S): at 05:13

## 2018-11-19 RX ADMIN — SEVELAMER CARBONATE 800 MILLIGRAM(S): 2400 POWDER, FOR SUSPENSION ORAL at 17:56

## 2018-11-19 RX ADMIN — LATANOPROST 1 DROP(S): 0.05 SOLUTION/ DROPS OPHTHALMIC; TOPICAL at 21:09

## 2018-11-19 RX ADMIN — Medication 50 MILLIGRAM(S): at 17:56

## 2018-11-19 RX ADMIN — HEPARIN SODIUM 5000 UNIT(S): 5000 INJECTION INTRAVENOUS; SUBCUTANEOUS at 21:09

## 2018-11-19 RX ADMIN — PANTOPRAZOLE SODIUM 40 MILLIGRAM(S): 20 TABLET, DELAYED RELEASE ORAL at 12:40

## 2018-11-19 RX ADMIN — SIMETHICONE 80 MILLIGRAM(S): 80 TABLET, CHEWABLE ORAL at 05:13

## 2018-11-19 RX ADMIN — Medication 50 GRAM(S): at 14:57

## 2018-11-19 RX ADMIN — Medication 100 MILLIGRAM(S): at 15:00

## 2018-11-19 RX ADMIN — Medication 12.5 GRAM(S): at 12:38

## 2018-11-19 RX ADMIN — SEVELAMER CARBONATE 800 MILLIGRAM(S): 2400 POWDER, FOR SUSPENSION ORAL at 12:39

## 2018-11-19 RX ADMIN — ATORVASTATIN CALCIUM 40 MILLIGRAM(S): 80 TABLET, FILM COATED ORAL at 21:09

## 2018-11-19 RX ADMIN — HEPARIN SODIUM 5000 UNIT(S): 5000 INJECTION INTRAVENOUS; SUBCUTANEOUS at 05:14

## 2018-11-19 RX ADMIN — CALCITRIOL 0.25 MICROGRAM(S): 0.5 CAPSULE ORAL at 12:41

## 2018-11-19 RX ADMIN — SIMETHICONE 80 MILLIGRAM(S): 80 TABLET, CHEWABLE ORAL at 14:58

## 2018-11-19 RX ADMIN — SIMETHICONE 80 MILLIGRAM(S): 80 TABLET, CHEWABLE ORAL at 21:09

## 2018-11-19 RX ADMIN — Medication 1 MILLIGRAM(S): at 12:39

## 2018-11-19 RX ADMIN — Medication 325 MILLIGRAM(S): at 12:39

## 2018-11-19 RX ADMIN — HEPARIN SODIUM 5000 UNIT(S): 5000 INJECTION INTRAVENOUS; SUBCUTANEOUS at 14:58

## 2018-11-19 RX ADMIN — Medication 100 MILLIGRAM(S): at 05:14

## 2018-11-19 RX ADMIN — SEVELAMER CARBONATE 800 MILLIGRAM(S): 2400 POWDER, FOR SUSPENSION ORAL at 09:10

## 2018-11-19 RX ADMIN — LIDOCAINE 1 PATCH: 4 CREAM TOPICAL at 12:40

## 2018-11-19 RX ADMIN — Medication 100 MILLIGRAM(S): at 21:10

## 2018-11-19 RX ADMIN — Medication 200 MILLIGRAM(S): at 12:40

## 2018-11-19 NOTE — PROGRESS NOTE ADULT - ASSESSMENT
This is an 83 year old female with PMHx of CKD stage 4, Recent DVT/PE provoked by travel and completed a 6month course of Eliquis, CHF s/p PPM who presented to the ED for a cough and chest pain    # Left flank and LLQ pain:   - CT 11/10 interval development of sigmoid diverticulitis with no fluid collection or pneumoperitoneum , repeated ct scan on 11/ 13 showing improved sigmoid colitis.   - Continue on Cipro and Flagyl   - Surgery consulted and following. no intervention at this point  - Diet increased to Dysphagia 1 pureed with thin liquids    # Left knee pain:   - Most likely gouty arthritis  - Bilateral Knee x-rays ordered, no acute fracture. Degenerative changes    - off steroids     # HTN:  - Continue on Lopressor 50 mg q 12 hrs    # Anemia:   - c/w FeSO4    # Chest pain:   - ICD interrogated and no events   - ACS ruled out   - Continue on metoprolol and Atorvastatin    # HTN:   - c/w  lopressor    # CKD stage 4:   - stable (baseline ~ 2.3 - 2.8)  - c/w calcitriol  - monitor bmp    # Loose watery Bowel Movement:   -  C. Diff PCR is negative    Diet: Diet increased to Dysphagia 1 pureed with thin liquids  DVT ppx Lovenox 40mg daily   GI ppx:  Pantoprazole   Activity: ambulate as tolerated   Code Status: Full Code   Dispo: Anticipate discharge within 24 - 48 hrs

## 2018-11-19 NOTE — CHART NOTE - NSCHARTNOTEFT_GEN_A_CORE
Registered Dietitian Follow-Up ([id number])    ***Scroll to the bottom for RD recommendation***    Patient Profile Reviewed                           Yes [x]   No []  Nutrition History Previously Obtained        Yes [x]  No []  - pt's diet is still not the correct one, but likely due to LIP's choice of wanting to avoid aspiration s/p RR 11/15 from choking on jello? s/p SLP 11/17 and found to have no issue. advance as tolerated.       PERTINENT MEDICAL INFORMATIONS:  (1) abd + L knee pain has improved. C diff neg. P/w acute diverticulitis. CKD 4 stable.  (2) On IV, abx, no surgical IR at this time. D/c planning. s/p RR 11/15.   (3) s/p SLP 11/17 and found to have no issue. advance as tolerated.       PERTINENT SUBJECTIVE INFORMATION:  (1) Pt reports of chocking on Jello last week and now she is scare of eating. Doing about 75% of FULL liquids at this time        DIET ORDER:   FULL - Dash/TLC - Renal dialysis ???         ANTHROPOMETRICS:  - Ht.  160cm  - Wt. (11/4): 61.2kg - no new weight still  - BMI. 23.9  - IBW       PERTINENT LAB DATA: 11/19: 8.9/28.5, Na 133, BUN 26, Glucose 105, GFR 32, Mag 1.6  PERTINENT MEDS: heparin, abx, metronidazole, d5w, ondansetron, tramadol, iron, renagel, insulin, acetaminophen, simethicone, metoprolol, atorvastatin, b9, calcitriol.       PHYSICAL FINDINGS  - APPEARANCE:        alert and oriented. No edema.   - GI FUNCTION:        LBM 11/19 small one per pt.  - TUBES:                       - ORAL/MOUTH:      none reported  - SKIN:                        ecchymosis vs intact      NUTRITION REQUIREMENTS  WEIGHT USED:                          ABW is 61.2kg  ESTIMATED ENERGY NEEDS:       CONTINUE [x  ]      ADJUST [  ]    ESTIMATED ENERGY NEEDS:         2146-3725 kcal/day (MSJ x 1.2-1.3)  ESTIMATED PROTEIN NEEDS:        61-73 g/day (1.0-1.2 g/kg of ABW)  ESTIMATED FLUID NEEDS:             1ml/kcal    CURRENT NUTRIENT NEEDS:    remains          [ x ] PREVIOUS NUTRITION DIAGNOSIS:   (1) inadequate protein-energy intake - remains            [ x ] ONGOING        [  ] RESOLVED          NUTRITION INTERVENTION:    [ x ] ORAL        [ ] EN/TF     GOAL/EXPECTED OUTCOME:     pt to consume and tolerate >75% of all meals and snacks and rec supplement upon f/u  INDICATOR/MONITORING:       RD to monitor diet order, energy intake, body composition, nutrition focused physical findings (PO tolerance)  PATIENT's INTERVENTION:        Meals and snacks. Medical food supplements      RECS: (1) Please advance diet as tolerated to DYSPHAGIA 1 PUREED w/ THINS LIQUIDS. (2) Order ENSURE ENLIVE q12hr. Because pt reports of poor dentition and needs very soft foods.

## 2018-11-19 NOTE — PROGRESS NOTE ADULT - SUBJECTIVE AND OBJECTIVE BOX
SUBJECTIVE:    Patient is a 83y old Female who presents with a chief complaint of chest pain and SOB (2018 07:56)  Currently admitted to medicine with the primary diagnosis of Left flank pain  Today is hospital day 15d. This morning she is resting comfortably in bed and reports no new issues or overnight events.   pt states improved flank pain and denies N/V.    PAST MEDICAL & SURGICAL HISTORY  Hypertension  Pacemaker  Chronic kidney disease  Artificial pacemaker    SOCIAL HISTORY:  Negative for smoking/alcohol/drug use.     ALLERGIES:  Keflex (Unknown)    MEDICATIONS:  STANDING MEDICATIONS  atorvastatin 40 milliGRAM(s) Oral at bedtime  calcitriol   Capsule 0.25 MICROGram(s) Oral daily  ciprofloxacin   IVPB      ciprofloxacin   IVPB 400 milliGRAM(s) IV Intermittent daily  dextrose 5% + sodium chloride 0.9%. 1000 milliLiter(s) IV Continuous <Continuous>  ferrous    sulfate 325 milliGRAM(s) Oral daily  folic acid 1 milliGRAM(s) Oral daily  heparin  Injectable 5000 Unit(s) SubCutaneous every 8 hours  influenza   Vaccine 0.5 milliLiter(s) IntraMuscular once  insulin regular  human recombinant. 10 Unit(s) IV Push once  latanoprost 0.005% Ophthalmic Solution 1 Drop(s) Both EYES at bedtime  lidocaine   Patch 1 Patch Transdermal daily  magnesium sulfate  IVPB 2 Gram(s) IV Intermittent once  metoprolol tartrate 50 milliGRAM(s) Oral two times a day  metroNIDAZOLE  IVPB 500 milliGRAM(s) IV Intermittent every 8 hours  metroNIDAZOLE  IVPB      pantoprazole  Injectable 40 milliGRAM(s) IV Push daily  sevelamer hydrochloride 800 milliGRAM(s) Oral three times a day with meals  simethicone 80 milliGRAM(s) Chew three times a day    PRN MEDICATIONS  acetaminophen   Tablet .. 650 milliGRAM(s) Oral every 6 hours PRN  ondansetron Injectable 4 milliGRAM(s) IV Push every 8 hours PRN  traMADol 25 milliGRAM(s) Oral every 6 hours PRN    VITALS:   T(F): 97.2  HR: 70  BP: 182/88  RR: 18  SpO2: --    LABS:                        8.9    19.73 )-----------( 394      ( 2018 05:49 )             28.5     11-19    133<L>  |  99  |  26<H>  ----------------------------<  105<H>  4.7   |  17  |  1.5    Ca    8.5      2018 05:49  Mg     1.6             Urinalysis Basic - ( 2018 14:46 )    Color: Yellow / Appearance: Turbid / S.025 / pH: x  Gluc: x / Ketone: Trace  / Bili: Small / Urobili: 0.2 mg/dL   Blood: x / Protein: >=300 mg/dL / Nitrite: Positive   Leuk Esterase: Small / RBC: x / WBC 1-2 /HPF   Sq Epi: x / Non Sq Epi: Moderate /HPF / Bacteria: Moderate /HPF      RADIOLOGY:    < from: CT Abdomen and Pelvis w/ Oral Cont (18 @ 20:37) >    IMPRESSION:     Mild improvement of sigmoid diverticulitis since 11/10/2018.      < end of copied text >      PHYSICAL EXAM:  GEN: No acute distress  LUNGS: Clear to auscultation bilaterally   HEART: S1/S2 present. RRR.   ABD: Soft, mild tenderness on LLQ palpation, non-distended. Bowel sounds present  EXT: NC/NC/NE/2+PP/JUÁREZ  NEURO: AAOX3

## 2018-11-19 NOTE — PROGRESS NOTE ADULT - SUBJECTIVE AND OBJECTIVE BOX
LENGTH OF HOSPITAL STAY: 15d    CHIEF COMPLAINT:   Patient is a 83y old  Female who presents with a chief complaint of chest pain and SOB (2018 08:51)      HISTORY OF PRESENTING ILLNESS:    HPI:  83 yr old female with pmhx of HTN, CKD 4, recent DVT/PE (thought provoked by travel, completed 6mo a/c and no longer on Eliquis) CHF s/p PPM presented to ER for cough and chest pain. As per patient, she developed non productive cough for the past 2 days with no fevers. Today morning 3am she woke up with pain in the L flank and b/l knees that is 6-8/10 intermittent and she also had one episode of chest tightness centrally located, non radiating which resolved on its own. She denies any fever, chills, headache, urinary or bowel issues. No recent travel or sick contacts. (2018 19:50)    Patient feels better.  Abdominal and Left Knee pain has improved.  C dif titers are negative off isolation    PAST MEDICAL & SURGICAL HISTORY  PAST MEDICAL & SURGICAL HISTORY:  Hypertension  Pacemaker  Chronic kidney disease  Artificial pacemaker    SOCIAL HISTORY:    ALLERGIES:  Keflex (Unknown)    MEDICATIONS:  STANDING MEDICATIONS  atorvastatin 40 milliGRAM(s) Oral at bedtime  calcitriol   Capsule 0.25 MICROGram(s) Oral daily  ciprofloxacin   IVPB      ciprofloxacin   IVPB 400 milliGRAM(s) IV Intermittent daily  dextrose 5% + sodium chloride 0.9%. 1000 milliLiter(s) IV Continuous <Continuous>  ferrous    sulfate 325 milliGRAM(s) Oral daily  folic acid 1 milliGRAM(s) Oral daily  heparin  Injectable 5000 Unit(s) SubCutaneous every 8 hours  influenza   Vaccine 0.5 milliLiter(s) IntraMuscular once  insulin regular  human recombinant. 10 Unit(s) IV Push once  latanoprost 0.005% Ophthalmic Solution 1 Drop(s) Both EYES at bedtime  lidocaine   Patch 1 Patch Transdermal daily  metoprolol tartrate 50 milliGRAM(s) Oral two times a day  metroNIDAZOLE  IVPB 500 milliGRAM(s) IV Intermittent every 8 hours  metroNIDAZOLE  IVPB      pantoprazole  Injectable 40 milliGRAM(s) IV Push daily  sevelamer hydrochloride 800 milliGRAM(s) Oral three times a day with meals  simethicone 80 milliGRAM(s) Chew three times a day    PRN MEDICATIONS  acetaminophen   Tablet .. 650 milliGRAM(s) Oral every 6 hours PRN  ondansetron Injectable 4 milliGRAM(s) IV Push every 8 hours PRN  traMADol 25 milliGRAM(s) Oral every 6 hours PRN    VITALS:   T(F): 97.1  HR: 80  BP: 185/88  RR: 18  SpO2: --    LABS:                        8.9    19.73 )-----------( 394      ( 2018 05:49 )             28.5     11-19    133<L>  |  99  |  26<H>  ----------------------------<  105<H>  4.7   |  17  |  1.5    Ca    8.5      2018 05:49  Mg     1.6             Urinalysis Basic - ( 2018 14:46 )    Color: Yellow / Appearance: Turbid / S.025 / pH: x  Gluc: x / Ketone: Trace  / Bili: Small / Urobili: 0.2 mg/dL   Blood: x / Protein: >=300 mg/dL / Nitrite: Positive   Leuk Esterase: Small / RBC: x / WBC 1-2 /HPF   Sq Epi: x / Non Sq Epi: Moderate /HPF / Bacteria: Moderate /HPF                RADIOLOGY:    PHYSICAL EXAM:  GEN: No acute distress  HEENT:   LUNGS: Clear to auscultation bilaterally   HEART: S1/S2 present. RRR.   ABD: Soft slight tenderness, non-distended. Bowel sounds present  EXT:  NEURO: AAOX3

## 2018-11-19 NOTE — PROGRESS NOTE ADULT - ASSESSMENT
acute diverticulitis  PPM   CKD 4 - stable  hx of PE / dvt, AC course complete  OA / gout / RA    plan:    very gentle ivf  cipro / flagyl  no surgical intervention at this point  prn morphine sulfate /  percocet  oob to chair  serial abd exams  d/w resident  worse      d/c vanco    repeat cbc in am    anticipated d/c next 24 to 48 hrs.

## 2018-11-20 VITALS
HEART RATE: 70 BPM | SYSTOLIC BLOOD PRESSURE: 168 MMHG | RESPIRATION RATE: 16 BRPM | TEMPERATURE: 98 F | DIASTOLIC BLOOD PRESSURE: 81 MMHG

## 2018-11-20 DIAGNOSIS — E87.5 HYPERKALEMIA: ICD-10-CM

## 2018-11-20 LAB
-  AMIKACIN: SIGNIFICANT CHANGE UP
-  AMOXICILLIN/CLAVULANIC ACID: SIGNIFICANT CHANGE UP
-  AMPICILLIN/SULBACTAM: SIGNIFICANT CHANGE UP
-  AMPICILLIN: SIGNIFICANT CHANGE UP
-  AMPICILLIN: SIGNIFICANT CHANGE UP
-  AZTREONAM: SIGNIFICANT CHANGE UP
-  CEFAZOLIN: SIGNIFICANT CHANGE UP
-  CEFEPIME: SIGNIFICANT CHANGE UP
-  CEFOXITIN: SIGNIFICANT CHANGE UP
-  CEFTRIAXONE: SIGNIFICANT CHANGE UP
-  CIPROFLOXACIN: SIGNIFICANT CHANGE UP
-  CIPROFLOXACIN: SIGNIFICANT CHANGE UP
-  ERTAPENEM: SIGNIFICANT CHANGE UP
-  GENTAMICIN: SIGNIFICANT CHANGE UP
-  IMIPENEM: SIGNIFICANT CHANGE UP
-  LEVOFLOXACIN: SIGNIFICANT CHANGE UP
-  LEVOFLOXACIN: SIGNIFICANT CHANGE UP
-  MEROPENEM: SIGNIFICANT CHANGE UP
-  NITROFURANTOIN: SIGNIFICANT CHANGE UP
-  NITROFURANTOIN: SIGNIFICANT CHANGE UP
-  PIPERACILLIN/TAZOBACTAM: SIGNIFICANT CHANGE UP
-  TETRACYCLINE: SIGNIFICANT CHANGE UP
-  TIGECYCLINE: SIGNIFICANT CHANGE UP
-  TOBRAMYCIN: SIGNIFICANT CHANGE UP
-  TRIMETHOPRIM/SULFAMETHOXAZOLE: SIGNIFICANT CHANGE UP
-  VANCOMYCIN: SIGNIFICANT CHANGE UP
ANION GAP SERPL CALC-SCNC: 11 MMOL/L — SIGNIFICANT CHANGE UP (ref 7–14)
ANION GAP SERPL CALC-SCNC: 12 MMOL/L — SIGNIFICANT CHANGE UP (ref 7–14)
BUN SERPL-MCNC: 20 MG/DL — SIGNIFICANT CHANGE UP (ref 10–20)
BUN SERPL-MCNC: 22 MG/DL — HIGH (ref 10–20)
CALCIUM SERPL-MCNC: 8.5 MG/DL — SIGNIFICANT CHANGE UP (ref 8.5–10.1)
CALCIUM SERPL-MCNC: 8.5 MG/DL — SIGNIFICANT CHANGE UP (ref 8.5–10.1)
CHLORIDE SERPL-SCNC: 105 MMOL/L — SIGNIFICANT CHANGE UP (ref 98–110)
CHLORIDE SERPL-SCNC: 105 MMOL/L — SIGNIFICANT CHANGE UP (ref 98–110)
CO2 SERPL-SCNC: 22 MMOL/L — SIGNIFICANT CHANGE UP (ref 17–32)
CO2 SERPL-SCNC: 23 MMOL/L — SIGNIFICANT CHANGE UP (ref 17–32)
CREAT SERPL-MCNC: 1.6 MG/DL — HIGH (ref 0.7–1.5)
CREAT SERPL-MCNC: 1.6 MG/DL — HIGH (ref 0.7–1.5)
CULTURE RESULTS: SIGNIFICANT CHANGE UP
GLUCOSE SERPL-MCNC: 83 MG/DL — SIGNIFICANT CHANGE UP (ref 70–99)
GLUCOSE SERPL-MCNC: 94 MG/DL — SIGNIFICANT CHANGE UP (ref 70–99)
HCT VFR BLD CALC: 28.7 % — LOW (ref 37–47)
HGB BLD-MCNC: 9 G/DL — LOW (ref 12–16)
MAGNESIUM SERPL-MCNC: 1.8 MG/DL — SIGNIFICANT CHANGE UP (ref 1.8–2.4)
MCHC RBC-ENTMCNC: 28.6 PG — SIGNIFICANT CHANGE UP (ref 27–31)
MCHC RBC-ENTMCNC: 31.4 G/DL — LOW (ref 32–37)
MCV RBC AUTO: 91.1 FL — SIGNIFICANT CHANGE UP (ref 81–99)
METHOD TYPE: SIGNIFICANT CHANGE UP
METHOD TYPE: SIGNIFICANT CHANGE UP
NRBC # BLD: 0 /100 WBCS — SIGNIFICANT CHANGE UP (ref 0–0)
ORGANISM # SPEC MICROSCOPIC CNT: SIGNIFICANT CHANGE UP
PLATELET # BLD AUTO: 440 K/UL — HIGH (ref 130–400)
POTASSIUM SERPL-MCNC: 4.9 MMOL/L — SIGNIFICANT CHANGE UP (ref 3.5–5)
POTASSIUM SERPL-MCNC: 5.6 MMOL/L — HIGH (ref 3.5–5)
POTASSIUM SERPL-SCNC: 4.9 MMOL/L — SIGNIFICANT CHANGE UP (ref 3.5–5)
POTASSIUM SERPL-SCNC: 5.6 MMOL/L — HIGH (ref 3.5–5)
RBC # BLD: 3.15 M/UL — LOW (ref 4.2–5.4)
RBC # FLD: 13.8 % — SIGNIFICANT CHANGE UP (ref 11.5–14.5)
SODIUM SERPL-SCNC: 138 MMOL/L — SIGNIFICANT CHANGE UP (ref 135–146)
SODIUM SERPL-SCNC: 140 MMOL/L — SIGNIFICANT CHANGE UP (ref 135–146)
SPECIMEN SOURCE: SIGNIFICANT CHANGE UP
WBC # BLD: 14.18 K/UL — HIGH (ref 4.8–10.8)
WBC # FLD AUTO: 14.18 K/UL — HIGH (ref 4.8–10.8)

## 2018-11-20 RX ORDER — FERROUS SULFATE 325(65) MG
1 TABLET ORAL
Qty: 30 | Refills: 0 | OUTPATIENT
Start: 2018-11-20 | End: 2018-12-19

## 2018-11-20 RX ORDER — MOXIFLOXACIN HYDROCHLORIDE TABLETS, 400 MG 400 MG/1
1 TABLET, FILM COATED ORAL
Qty: 10 | Refills: 0 | OUTPATIENT
Start: 2018-11-20 | End: 2018-11-24

## 2018-11-20 RX ORDER — PANTOPRAZOLE SODIUM 20 MG/1
40 TABLET, DELAYED RELEASE ORAL
Qty: 0 | Refills: 0 | COMMUNITY
Start: 2018-11-20

## 2018-11-20 RX ORDER — METRONIDAZOLE 500 MG
1 TABLET ORAL
Qty: 15 | Refills: 0 | OUTPATIENT
Start: 2018-11-20 | End: 2018-11-24

## 2018-11-20 RX ORDER — SEVELAMER CARBONATE 2400 MG/1
1 POWDER, FOR SUSPENSION ORAL
Qty: 0 | Refills: 0 | DISCHARGE
Start: 2018-11-20

## 2018-11-20 RX ADMIN — PANTOPRAZOLE SODIUM 40 MILLIGRAM(S): 20 TABLET, DELAYED RELEASE ORAL at 12:18

## 2018-11-20 RX ADMIN — SEVELAMER CARBONATE 800 MILLIGRAM(S): 2400 POWDER, FOR SUSPENSION ORAL at 17:27

## 2018-11-20 RX ADMIN — Medication 100 MILLIGRAM(S): at 05:11

## 2018-11-20 RX ADMIN — CALCITRIOL 0.25 MICROGRAM(S): 0.5 CAPSULE ORAL at 12:20

## 2018-11-20 RX ADMIN — SEVELAMER CARBONATE 800 MILLIGRAM(S): 2400 POWDER, FOR SUSPENSION ORAL at 12:17

## 2018-11-20 RX ADMIN — LIDOCAINE 1 PATCH: 4 CREAM TOPICAL at 17:34

## 2018-11-20 RX ADMIN — Medication 325 MILLIGRAM(S): at 12:18

## 2018-11-20 RX ADMIN — Medication 50 MILLIGRAM(S): at 17:28

## 2018-11-20 RX ADMIN — HEPARIN SODIUM 5000 UNIT(S): 5000 INJECTION INTRAVENOUS; SUBCUTANEOUS at 14:44

## 2018-11-20 RX ADMIN — Medication 100 MILLIGRAM(S): at 14:44

## 2018-11-20 RX ADMIN — SIMETHICONE 80 MILLIGRAM(S): 80 TABLET, CHEWABLE ORAL at 05:11

## 2018-11-20 RX ADMIN — Medication 200 MILLIGRAM(S): at 12:17

## 2018-11-20 RX ADMIN — Medication 1 MILLIGRAM(S): at 12:18

## 2018-11-20 RX ADMIN — HEPARIN SODIUM 5000 UNIT(S): 5000 INJECTION INTRAVENOUS; SUBCUTANEOUS at 05:11

## 2018-11-20 RX ADMIN — LIDOCAINE 1 PATCH: 4 CREAM TOPICAL at 12:19

## 2018-11-20 RX ADMIN — Medication 50 MILLIGRAM(S): at 05:11

## 2018-11-20 RX ADMIN — SIMETHICONE 80 MILLIGRAM(S): 80 TABLET, CHEWABLE ORAL at 14:46

## 2018-11-20 NOTE — PROGRESS NOTE ADULT - SUBJECTIVE AND OBJECTIVE BOX
DIAGNOSIS:   HOSPITAL DAY #:    STATUS POST:    POST OPERATIVE DAY #:     Vital Signs Last 24 Hrs  T(C): 36.6 (20 Nov 2018 16:00), Max: 36.9 (19 Nov 2018 23:36)  T(F): 97.9 (20 Nov 2018 16:00), Max: 98.5 (19 Nov 2018 23:36)  HR: 70 (20 Nov 2018 16:00) (70 - 71)  BP: 168/81 (20 Nov 2018 16:00) (138/68 - 177/80)  BP(mean): --  RR: 16 (20 Nov 2018 16:00) (16 - 18)  SpO2: --    SUBJECTIVE: Pt seen    Pain: YES  [ ]   NO [ ]   Nausea: [ ] YES [ ] NO           Vomiting: [ ] YES [ ] NO  Flatus: [ ] YES [ ] NO             Bowel Movement: [ ] YES [ ] NO     Void: [ ]YES [ ]No      TRENT DRAINAGE: SIGNIFICANT [ ]   NOT SIGNIFICANT [ ]   NOT APPLICABLE [ ]  YES [ ] NO    General Appearance: Appears well, NAD  Neck: Supple  Chest: Equal expansion bilaterally, equal breath sounds  CV: Pulse regular presently  Abdomen: Soft [x ] YES [ ]NO  DISTENDED [ ] YES [x ] NO TENDERNESS [ ]YES x[ ]NO  INCISIONS: HEALING WELL [ ] YES  [ ] NO ERYTHEMA [ ] YES [ ] NO DRAINAGE [ ] YES  [ ] NO  Extremities: Grossly symmetric, CALF TENDERNESS [ ] YES  [ ] NO      LABS:                        9.0    14.18 )-----------( 440      ( 20 Nov 2018 06:05 )             28.7     11-20    140  |  105  |  20  ----------------------------<  94  4.9   |  23  |  1.6<H>    Ca    8.5      20 Nov 2018 12:28  Mg     1.8     11-20              ASSESSMENT:     GOOD POST OP COURSE [ ]  YES  [ ] NO  CONDITION IMPROVING  x[]  YES  [ ]  NO          PLAN:    CONTINUE PRESENT MANAGEMENT  [ ] YES  [ ] NO

## 2018-11-20 NOTE — PROGRESS NOTE ADULT - PROVIDER SPECIALTY LIST ADULT
Cardiology
Cardiology
Internal Medicine
Nephrology
Pulmonology
Surgery
Internal Medicine
Nephrology
Internal Medicine
Internal Medicine

## 2018-11-20 NOTE — CHART NOTE - NSCHARTNOTEFT_GEN_A_CORE
<<<RESIDENT DISCHARGE NOTE>>>     RACHEL SUMMERS  MRN-[id number]    VITAL SIGNS:  T(F): 97.1 (11-20-18 @ 08:00), Max: 98.5 (11-19-18 @ 23:36)  HR: 71 (11-20-18 @ 08:00)  BP: 177/80 (11-20-18 @ 08:00)  SpO2: --      PHYSICAL EXAMINATION:  GEN: No acute distress  LUNGS: Clear to auscultation bilaterally   HEART: S1/S2 present. RRR.   ABD: Soft, mild tenderness on LLQ palpation, non-distended. Bowel sounds present  EXT: NC/NC/NE/2+PP/JUÁREZ  NEURO: AAOX3      TEST RESULTS:                        9.0    14.18 )-----------( 440      ( 20 Nov 2018 06:05 )             28.7       11-20    140  |  105  |  20  ----------------------------<  94  4.9   |  23  |  1.6<H>    Ca    8.5      20 Nov 2018 12:28  Mg     1.8     11-20        FINAL DISCHARGE INTERVIEW:  Resident(s) Present: (Name: Dr. Ranjan Teague), RN Present: (Name: SURYA)    DISCHARGE MEDICATION RECONCILIATION  reviewed with Attending (Name: Dr. Booker Olvera)    DISPOSITION:  SNF/ NH

## 2018-11-20 NOTE — PROGRESS NOTE ADULT - SUBJECTIVE AND OBJECTIVE BOX
LENGTH OF HOSPITAL STAY: 16d    CHIEF COMPLAINT:   Patient is a 83y old  Female who presents with a chief complaint of chest pain and SOB (2018 11:40)      HISTORY OF PRESENTING ILLNESS:    HPI:  83 yr old female with pmhx of HTN, CKD 4, recent DVT/PE (thought provoked by travel, completed 6mo a/c and no longer on Eliquis) CHF s/p PPM presented to ER for cough and chest pain. As per patient, she developed non productive cough for the past 2 days with no fevers. Today morning 3am she woke up with pain in the L flank and b/l knees that is 6-8/10 intermittent and she also had one episode of chest tightness centrally located, non radiating which resolved on its own. She denies any fever, chills, headache, urinary or bowel issues. No recent travel or sick contacts. (2018 19:50)    Patient feels better.  Stools more formed. Less Abd pain.  No n/v    PAST MEDICAL & SURGICAL HISTORY  PAST MEDICAL & SURGICAL HISTORY:  Hypertension  Pacemaker  Chronic kidney disease  Artificial pacemaker    SOCIAL HISTORY:    ALLERGIES:  Keflex (Unknown)    MEDICATIONS:  STANDING MEDICATIONS  atorvastatin 40 milliGRAM(s) Oral at bedtime  calcitriol   Capsule 0.25 MICROGram(s) Oral daily  ciprofloxacin   IVPB      ciprofloxacin   IVPB 400 milliGRAM(s) IV Intermittent daily  dextrose 5% + sodium chloride 0.9%. 1000 milliLiter(s) IV Continuous <Continuous>  ferrous    sulfate 325 milliGRAM(s) Oral daily  folic acid 1 milliGRAM(s) Oral daily  heparin  Injectable 5000 Unit(s) SubCutaneous every 8 hours  influenza   Vaccine 0.5 milliLiter(s) IntraMuscular once  insulin regular  human recombinant. 10 Unit(s) IV Push once  latanoprost 0.005% Ophthalmic Solution 1 Drop(s) Both EYES at bedtime  lidocaine   Patch 1 Patch Transdermal daily  metoprolol tartrate 50 milliGRAM(s) Oral two times a day  metroNIDAZOLE  IVPB 500 milliGRAM(s) IV Intermittent every 8 hours  metroNIDAZOLE  IVPB      pantoprazole  Injectable 40 milliGRAM(s) IV Push daily  sevelamer hydrochloride 800 milliGRAM(s) Oral three times a day with meals  simethicone 80 milliGRAM(s) Chew three times a day    PRN MEDICATIONS  acetaminophen   Tablet .. 650 milliGRAM(s) Oral every 6 hours PRN  ondansetron Injectable 4 milliGRAM(s) IV Push every 8 hours PRN  traMADol 25 milliGRAM(s) Oral every 6 hours PRN    VITALS:   T(F): 98.5  HR: 71  BP: 138/68  RR: 18  SpO2: --    LABS:                        9.0    14.18 )-----------( 440      ( 2018 06:05 )             28.7     11-20    138  |  105  |  22<H>  ----------------------------<  83  5.6<H>   |  22  |  1.6<H>    Ca    8.5      2018 06:05  Mg     1.8     1120        Urinalysis Basic - ( 2018 14:46 )    Color: Yellow / Appearance: Turbid / S.025 / pH: x  Gluc: x / Ketone: Trace  / Bili: Small / Urobili: 0.2 mg/dL   Blood: x / Protein: >=300 mg/dL / Nitrite: Positive   Leuk Esterase: Small / RBC: x / WBC 1-2 /HPF   Sq Epi: x / Non Sq Epi: Moderate /HPF / Bacteria: Moderate /HPF            Culture - Urine (collected 2018 14:46)  Source: .Urine Clean Catch (Midstream)  Preliminary Report (2018 19:17):    >100,000 CFU/ml Enterococcus faecalis    10,000 - 49,000 CFU/mL Escherichia coli          RADIOLOGY:    PHYSICAL EXAM:  GEN: No acute distress  HEENT:   LUNGS: Clear to auscultation bilaterally   HEART: S1/S2 present. RRR.   ABD: Soft, non-tender, non-distended. Bowel sounds present  EXT: improved rom left knee  NEURO: AAOX3

## 2018-11-20 NOTE — PROGRESS NOTE ADULT - REASON FOR ADMISSION
chest pain and SOB

## 2018-11-20 NOTE — PROGRESS NOTE ADULT - PROBLEM SELECTOR PROBLEM 4
Shortness of breath
Hypertension
Shortness of breath

## 2018-11-20 NOTE — PROGRESS NOTE ADULT - PROBLEM SELECTOR PROBLEM 3
Pain in both knees, unspecified chronicity
Chronic kidney disease
Other chest pain
Pain in both knees, unspecified chronicity

## 2018-11-20 NOTE — PROGRESS NOTE ADULT - PROBLEM SELECTOR PROBLEM 2
Chronic kidney disease
Pacemaker
Chronic kidney disease
Chronic kidney disease
Pain in both knees, unspecified chronicity
Chronic kidney disease
Chronic kidney disease

## 2018-11-20 NOTE — PROGRESS NOTE ADULT - ASSESSMENT
acute diverticulitis  PPM   CKD 4 - stable  hx of PE / dvt, AC course complete  OA / gout / RA    plan:    very gentle ivf  cipro / flagyl  no surgical intervention at this point  oob to chair    d/w resident      awaiting bed at [facility] for rehab      d/c vanco    repeat cbc in am    anticipated d/c next 24 to 48 hrs.

## 2018-11-20 NOTE — PROGRESS NOTE ADULT - PROBLEM SELECTOR PROBLEM 1
Sigmoid diverticulitis
Hypertension
Hypertension
Left flank pain
Hypertension

## 2018-11-21 ENCOUNTER — OUTPATIENT (OUTPATIENT)
Dept: OUTPATIENT SERVICES | Facility: HOSPITAL | Age: 83
LOS: 1 days | Discharge: HOME | End: 2018-11-21

## 2018-11-21 DIAGNOSIS — I10 ESSENTIAL (PRIMARY) HYPERTENSION: ICD-10-CM

## 2018-11-21 DIAGNOSIS — Z95.0 PRESENCE OF CARDIAC PACEMAKER: Chronic | ICD-10-CM

## 2018-11-26 DIAGNOSIS — H40.9 UNSPECIFIED GLAUCOMA: ICD-10-CM

## 2018-11-26 DIAGNOSIS — I42.8 OTHER CARDIOMYOPATHIES: ICD-10-CM

## 2018-11-26 DIAGNOSIS — E87.5 HYPERKALEMIA: ICD-10-CM

## 2018-11-26 DIAGNOSIS — J98.11 ATELECTASIS: ICD-10-CM

## 2018-11-26 DIAGNOSIS — R19.7 DIARRHEA, UNSPECIFIED: ICD-10-CM

## 2018-11-26 DIAGNOSIS — A41.9 SEPSIS, UNSPECIFIED ORGANISM: ICD-10-CM

## 2018-11-26 DIAGNOSIS — I13.0 HYPERTENSIVE HEART AND CHRONIC KIDNEY DISEASE WITH HEART FAILURE AND STAGE 1 THROUGH STAGE 4 CHRONIC KIDNEY DISEASE, OR UNSPECIFIED CHRONIC KIDNEY DISEASE: ICD-10-CM

## 2018-11-26 DIAGNOSIS — R09.89 OTHER SPECIFIED SYMPTOMS AND SIGNS INVOLVING THE CIRCULATORY AND RESPIRATORY SYSTEMS: ICD-10-CM

## 2018-11-26 DIAGNOSIS — R26.81 UNSTEADINESS ON FEET: ICD-10-CM

## 2018-11-26 DIAGNOSIS — K57.32 DIVERTICULITIS OF LARGE INTESTINE WITHOUT PERFORATION OR ABSCESS WITHOUT BLEEDING: ICD-10-CM

## 2018-11-26 DIAGNOSIS — N18.4 CHRONIC KIDNEY DISEASE, STAGE 4 (SEVERE): ICD-10-CM
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
## 2018-11-26 DIAGNOSIS — M10.9 GOUT, UNSPECIFIED: ICD-10-CM

## 2018-11-26 DIAGNOSIS — Z86.718 PERSONAL HISTORY OF OTHER VENOUS THROMBOSIS AND EMBOLISM: ICD-10-CM

## 2018-11-26 DIAGNOSIS — R33.9 RETENTION OF URINE, UNSPECIFIED: ICD-10-CM

## 2018-11-26 DIAGNOSIS — N39.0 URINARY TRACT INFECTION, SITE NOT SPECIFIED: ICD-10-CM

## 2018-11-26 DIAGNOSIS — D50.9 IRON DEFICIENCY ANEMIA, UNSPECIFIED: ICD-10-CM

## 2018-11-26 DIAGNOSIS — L92.9 GRANULOMATOUS DISORDER OF THE SKIN AND SUBCUTANEOUS TISSUE, UNSPECIFIED: ICD-10-CM
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
## 2018-11-26 DIAGNOSIS — Z51.89 ENCOUNTER FOR OTHER SPECIFIED AFTERCARE: ICD-10-CM

## 2018-11-26 DIAGNOSIS — R07.9 CHEST PAIN, UNSPECIFIED: ICD-10-CM

## 2018-11-26 DIAGNOSIS — I50.32 CHRONIC DIASTOLIC (CONGESTIVE) HEART FAILURE: ICD-10-CM

## 2018-11-26 DIAGNOSIS — R05 COUGH: ICD-10-CM

## 2018-11-26 DIAGNOSIS — Z95.810 PRESENCE OF AUTOMATIC (IMPLANTABLE) CARDIAC DEFIBRILLATOR: ICD-10-CM

## 2018-11-26 DIAGNOSIS — M17.0 BILATERAL PRIMARY OSTEOARTHRITIS OF KNEE: ICD-10-CM

## 2018-11-26 DIAGNOSIS — Z88.1 ALLERGY STATUS TO OTHER ANTIBIOTIC AGENTS STATUS: ICD-10-CM

## 2018-11-26 DIAGNOSIS — R07.89 OTHER CHEST PAIN: ICD-10-CM

## 2018-11-26 DIAGNOSIS — Z86.711 PERSONAL HISTORY OF PULMONARY EMBOLISM: ICD-10-CM

## 2018-11-26 DIAGNOSIS — T17.228A FOOD IN PHARYNX CAUSING OTHER INJURY, INITIAL ENCOUNTER: ICD-10-CM

## 2018-11-26 DIAGNOSIS — R13.10 DYSPHAGIA, UNSPECIFIED: ICD-10-CM
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
## 2018-12-13 ENCOUNTER — OUTPATIENT (OUTPATIENT)
Dept: OUTPATIENT SERVICES | Facility: HOSPITAL | Age: 83
LOS: 1 days | Discharge: HOME | End: 2018-12-13

## 2018-12-13 DIAGNOSIS — Z95.0 PRESENCE OF CARDIAC PACEMAKER: Chronic | ICD-10-CM

## 2018-12-13 DIAGNOSIS — R79.9 ABNORMAL FINDING OF BLOOD CHEMISTRY, UNSPECIFIED: ICD-10-CM

## 2018-12-15 ENCOUNTER — OUTPATIENT (OUTPATIENT)
Dept: OUTPATIENT SERVICES | Facility: HOSPITAL | Age: 83
LOS: 1 days | Discharge: HOME | End: 2018-12-15

## 2018-12-15 DIAGNOSIS — E87.5 HYPERKALEMIA: ICD-10-CM

## 2018-12-15 DIAGNOSIS — N18.9 CHRONIC KIDNEY DISEASE, UNSPECIFIED: ICD-10-CM

## 2018-12-15 DIAGNOSIS — Z95.0 PRESENCE OF CARDIAC PACEMAKER: Chronic | ICD-10-CM

## 2018-12-16 ENCOUNTER — OUTPATIENT (OUTPATIENT)
Dept: OUTPATIENT SERVICES | Facility: HOSPITAL | Age: 83
LOS: 1 days | Discharge: HOME | End: 2018-12-16

## 2018-12-16 DIAGNOSIS — Z95.0 PRESENCE OF CARDIAC PACEMAKER: Chronic | ICD-10-CM

## 2018-12-17 ENCOUNTER — OUTPATIENT (OUTPATIENT)
Dept: OUTPATIENT SERVICES | Facility: HOSPITAL | Age: 83
LOS: 1 days | Discharge: HOME | End: 2018-12-17

## 2018-12-17 DIAGNOSIS — Z95.0 PRESENCE OF CARDIAC PACEMAKER: Chronic | ICD-10-CM

## 2018-12-17 DIAGNOSIS — R30.0 DYSURIA: ICD-10-CM

## 2018-12-17 DIAGNOSIS — R79.9 ABNORMAL FINDING OF BLOOD CHEMISTRY, UNSPECIFIED: ICD-10-CM

## 2018-12-18 ENCOUNTER — OUTPATIENT (OUTPATIENT)
Dept: OUTPATIENT SERVICES | Facility: HOSPITAL | Age: 83
LOS: 1 days | Discharge: HOME | End: 2018-12-18

## 2018-12-18 DIAGNOSIS — Z95.0 PRESENCE OF CARDIAC PACEMAKER: Chronic | ICD-10-CM

## 2018-12-18 DIAGNOSIS — N18.9 CHRONIC KIDNEY DISEASE, UNSPECIFIED: ICD-10-CM

## 2019-01-04 ENCOUNTER — APPOINTMENT (OUTPATIENT)
Dept: CARDIOLOGY | Facility: CLINIC | Age: 84
End: 2019-01-04

## 2019-01-08 ENCOUNTER — INPATIENT (INPATIENT)
Facility: HOSPITAL | Age: 84
LOS: 7 days | Discharge: HOME HEALTH | End: 2019-01-16
Attending: INTERNAL MEDICINE | Admitting: INTERNAL MEDICINE
Payer: COMMERCIAL

## 2019-01-08 VITALS
HEART RATE: 83 BPM | TEMPERATURE: 101 F | OXYGEN SATURATION: 97 % | DIASTOLIC BLOOD PRESSURE: 74 MMHG | RESPIRATION RATE: 18 BRPM | SYSTOLIC BLOOD PRESSURE: 158 MMHG

## 2019-01-08 DIAGNOSIS — Z95.0 PRESENCE OF CARDIAC PACEMAKER: Chronic | ICD-10-CM

## 2019-01-09 LAB
ALBUMIN SERPL ELPH-MCNC: 3.5 G/DL — SIGNIFICANT CHANGE UP (ref 3.5–5.2)
ALP SERPL-CCNC: 70 U/L — SIGNIFICANT CHANGE UP (ref 30–115)
ALT FLD-CCNC: 17 U/L — SIGNIFICANT CHANGE UP (ref 0–41)
ANION GAP SERPL CALC-SCNC: 15 MMOL/L — HIGH (ref 7–14)
ANION GAP SERPL CALC-SCNC: 16 MMOL/L — HIGH (ref 7–14)
APPEARANCE UR: CLEAR — SIGNIFICANT CHANGE UP
AST SERPL-CCNC: 25 U/L — SIGNIFICANT CHANGE UP (ref 0–41)
BASOPHILS # BLD AUTO: 0.03 K/UL — SIGNIFICANT CHANGE UP (ref 0–0.2)
BASOPHILS # BLD AUTO: 0.03 K/UL — SIGNIFICANT CHANGE UP (ref 0–0.2)
BASOPHILS NFR BLD AUTO: 0.3 % — SIGNIFICANT CHANGE UP (ref 0–1)
BASOPHILS NFR BLD AUTO: 0.3 % — SIGNIFICANT CHANGE UP (ref 0–1)
BILIRUB SERPL-MCNC: 0.3 MG/DL — SIGNIFICANT CHANGE UP (ref 0.2–1.2)
BILIRUB UR-MCNC: NEGATIVE — SIGNIFICANT CHANGE UP
BUN SERPL-MCNC: 42 MG/DL — HIGH (ref 10–20)
BUN SERPL-MCNC: 46 MG/DL — HIGH (ref 10–20)
CALCIUM SERPL-MCNC: 9.1 MG/DL — SIGNIFICANT CHANGE UP (ref 8.5–10.1)
CALCIUM SERPL-MCNC: 9.1 MG/DL — SIGNIFICANT CHANGE UP (ref 8.5–10.1)
CHLORIDE SERPL-SCNC: 102 MMOL/L — SIGNIFICANT CHANGE UP (ref 98–110)
CHLORIDE SERPL-SCNC: 103 MMOL/L — SIGNIFICANT CHANGE UP (ref 98–110)
CO2 SERPL-SCNC: 23 MMOL/L — SIGNIFICANT CHANGE UP (ref 17–32)
CO2 SERPL-SCNC: 24 MMOL/L — SIGNIFICANT CHANGE UP (ref 17–32)
COLOR SPEC: YELLOW — SIGNIFICANT CHANGE UP
CREAT SERPL-MCNC: 1.7 MG/DL — HIGH (ref 0.7–1.5)
CREAT SERPL-MCNC: 1.9 MG/DL — HIGH (ref 0.7–1.5)
DIFF PNL FLD: NEGATIVE — SIGNIFICANT CHANGE UP
EOSINOPHIL # BLD AUTO: 0.22 K/UL — SIGNIFICANT CHANGE UP (ref 0–0.7)
EOSINOPHIL # BLD AUTO: 0.24 K/UL — SIGNIFICANT CHANGE UP (ref 0–0.7)
EOSINOPHIL NFR BLD AUTO: 2 % — SIGNIFICANT CHANGE UP (ref 0–8)
EOSINOPHIL NFR BLD AUTO: 2.6 % — SIGNIFICANT CHANGE UP (ref 0–8)
EPI CELLS # UR: ABNORMAL /HPF
GLUCOSE SERPL-MCNC: 84 MG/DL — SIGNIFICANT CHANGE UP (ref 70–99)
GLUCOSE SERPL-MCNC: 91 MG/DL — SIGNIFICANT CHANGE UP (ref 70–99)
GLUCOSE UR QL: NEGATIVE MG/DL — SIGNIFICANT CHANGE UP
HCT VFR BLD CALC: 26.5 % — LOW (ref 37–47)
HCT VFR BLD CALC: 27.3 % — LOW (ref 37–47)
HGB BLD-MCNC: 8.3 G/DL — LOW (ref 12–16)
HGB BLD-MCNC: 8.4 G/DL — LOW (ref 12–16)
IMM GRANULOCYTES NFR BLD AUTO: 0.4 % — HIGH (ref 0.1–0.3)
IMM GRANULOCYTES NFR BLD AUTO: 0.5 % — HIGH (ref 0.1–0.3)
KETONES UR-MCNC: NEGATIVE — SIGNIFICANT CHANGE UP
LACTATE SERPL-SCNC: 0.6 MMOL/L — SIGNIFICANT CHANGE UP (ref 0.5–2.2)
LEUKOCYTE ESTERASE UR-ACNC: NEGATIVE — SIGNIFICANT CHANGE UP
LIDOCAIN IGE QN: 41 U/L — SIGNIFICANT CHANGE UP (ref 7–60)
LYMPHOCYTES # BLD AUTO: 1.93 K/UL — SIGNIFICANT CHANGE UP (ref 1.2–3.4)
LYMPHOCYTES # BLD AUTO: 2.67 K/UL — SIGNIFICANT CHANGE UP (ref 1.2–3.4)
LYMPHOCYTES # BLD AUTO: 20.6 % — SIGNIFICANT CHANGE UP (ref 20.5–51.1)
LYMPHOCYTES # BLD AUTO: 24.1 % — SIGNIFICANT CHANGE UP (ref 20.5–51.1)
MAGNESIUM SERPL-MCNC: 1.8 MG/DL — SIGNIFICANT CHANGE UP (ref 1.8–2.4)
MCHC RBC-ENTMCNC: 28.7 PG — SIGNIFICANT CHANGE UP (ref 27–31)
MCHC RBC-ENTMCNC: 28.9 PG — SIGNIFICANT CHANGE UP (ref 27–31)
MCHC RBC-ENTMCNC: 30.8 G/DL — LOW (ref 32–37)
MCHC RBC-ENTMCNC: 31.3 G/DL — LOW (ref 32–37)
MCV RBC AUTO: 91.7 FL — SIGNIFICANT CHANGE UP (ref 81–99)
MCV RBC AUTO: 93.8 FL — SIGNIFICANT CHANGE UP (ref 81–99)
MONOCYTES # BLD AUTO: 1.17 K/UL — HIGH (ref 0.1–0.6)
MONOCYTES # BLD AUTO: 1.35 K/UL — HIGH (ref 0.1–0.6)
MONOCYTES NFR BLD AUTO: 12.2 % — HIGH (ref 1.7–9.3)
MONOCYTES NFR BLD AUTO: 12.5 % — HIGH (ref 1.7–9.3)
NEUTROPHILS # BLD AUTO: 5.98 K/UL — SIGNIFICANT CHANGE UP (ref 1.4–6.5)
NEUTROPHILS # BLD AUTO: 6.75 K/UL — HIGH (ref 1.4–6.5)
NEUTROPHILS NFR BLD AUTO: 60.9 % — SIGNIFICANT CHANGE UP (ref 42.2–75.2)
NEUTROPHILS NFR BLD AUTO: 63.6 % — SIGNIFICANT CHANGE UP (ref 42.2–75.2)
NITRITE UR-MCNC: NEGATIVE — SIGNIFICANT CHANGE UP
NRBC # BLD: 0 /100 WBCS — SIGNIFICANT CHANGE UP (ref 0–0)
PH UR: 6 — SIGNIFICANT CHANGE UP (ref 5–8)
PLATELET # BLD AUTO: 274 K/UL — SIGNIFICANT CHANGE UP (ref 130–400)
PLATELET # BLD AUTO: 294 K/UL — SIGNIFICANT CHANGE UP (ref 130–400)
POTASSIUM SERPL-MCNC: 4.3 MMOL/L — SIGNIFICANT CHANGE UP (ref 3.5–5)
POTASSIUM SERPL-MCNC: 4.9 MMOL/L — SIGNIFICANT CHANGE UP (ref 3.5–5)
POTASSIUM SERPL-SCNC: 4.3 MMOL/L — SIGNIFICANT CHANGE UP (ref 3.5–5)
POTASSIUM SERPL-SCNC: 4.9 MMOL/L — SIGNIFICANT CHANGE UP (ref 3.5–5)
PROT SERPL-MCNC: 6.3 G/DL — SIGNIFICANT CHANGE UP (ref 6–8)
PROT UR-MCNC: 100 MG/DL
RBC # BLD: 2.89 M/UL — LOW (ref 4.2–5.4)
RBC # BLD: 2.91 M/UL — LOW (ref 4.2–5.4)
RBC # FLD: 15.7 % — HIGH (ref 11.5–14.5)
RBC # FLD: 15.8 % — HIGH (ref 11.5–14.5)
SODIUM SERPL-SCNC: 141 MMOL/L — SIGNIFICANT CHANGE UP (ref 135–146)
SODIUM SERPL-SCNC: 142 MMOL/L — SIGNIFICANT CHANGE UP (ref 135–146)
SP GR SPEC: 1.01 — SIGNIFICANT CHANGE UP (ref 1.01–1.03)
UROBILINOGEN FLD QL: 0.2 MG/DL — SIGNIFICANT CHANGE UP (ref 0.2–0.2)
WBC # BLD: 11.07 K/UL — HIGH (ref 4.8–10.8)
WBC # BLD: 9.39 K/UL — SIGNIFICANT CHANGE UP (ref 4.8–10.8)
WBC # FLD AUTO: 11.07 K/UL — HIGH (ref 4.8–10.8)
WBC # FLD AUTO: 9.39 K/UL — SIGNIFICANT CHANGE UP (ref 4.8–10.8)

## 2019-01-09 RX ORDER — CALCITRIOL 0.5 UG/1
0.25 CAPSULE ORAL DAILY
Qty: 0 | Refills: 0 | Status: DISCONTINUED | OUTPATIENT
Start: 2019-01-09 | End: 2019-01-16

## 2019-01-09 RX ORDER — FOLIC ACID 0.8 MG
1 TABLET ORAL DAILY
Qty: 0 | Refills: 0 | Status: DISCONTINUED | OUTPATIENT
Start: 2019-01-09 | End: 2019-01-16

## 2019-01-09 RX ORDER — PANTOPRAZOLE SODIUM 20 MG/1
40 TABLET, DELAYED RELEASE ORAL
Qty: 0 | Refills: 0 | Status: DISCONTINUED | OUTPATIENT
Start: 2019-01-09 | End: 2019-01-16

## 2019-01-09 RX ORDER — METOPROLOL TARTRATE 50 MG
50 TABLET ORAL
Qty: 0 | Refills: 0 | Status: DISCONTINUED | OUTPATIENT
Start: 2019-01-09 | End: 2019-01-16

## 2019-01-09 RX ORDER — SODIUM CHLORIDE 9 MG/ML
1000 INJECTION INTRAMUSCULAR; INTRAVENOUS; SUBCUTANEOUS
Qty: 0 | Refills: 0 | Status: COMPLETED | OUTPATIENT
Start: 2019-01-09 | End: 2019-01-09

## 2019-01-09 RX ORDER — ATORVASTATIN CALCIUM 80 MG/1
40 TABLET, FILM COATED ORAL AT BEDTIME
Qty: 0 | Refills: 0 | Status: DISCONTINUED | OUTPATIENT
Start: 2019-01-09 | End: 2019-01-16

## 2019-01-09 RX ORDER — SODIUM CHLORIDE 9 MG/ML
500 INJECTION INTRAMUSCULAR; INTRAVENOUS; SUBCUTANEOUS ONCE
Qty: 0 | Refills: 0 | Status: COMPLETED | OUTPATIENT
Start: 2019-01-09 | End: 2019-01-09

## 2019-01-09 RX ORDER — MORPHINE SULFATE 50 MG/1
2 CAPSULE, EXTENDED RELEASE ORAL ONCE
Qty: 0 | Refills: 0 | Status: DISCONTINUED | OUTPATIENT
Start: 2019-01-09 | End: 2019-01-09

## 2019-01-09 RX ORDER — LATANOPROST 0.05 MG/ML
1 SOLUTION/ DROPS OPHTHALMIC; TOPICAL AT BEDTIME
Qty: 0 | Refills: 0 | Status: DISCONTINUED | OUTPATIENT
Start: 2019-01-09 | End: 2019-01-16

## 2019-01-09 RX ORDER — METRONIDAZOLE 500 MG
500 TABLET ORAL ONCE
Qty: 0 | Refills: 0 | Status: COMPLETED | OUTPATIENT
Start: 2019-01-09 | End: 2019-01-09

## 2019-01-09 RX ORDER — SEVELAMER CARBONATE 2400 MG/1
800 POWDER, FOR SUSPENSION ORAL
Qty: 0 | Refills: 0 | Status: DISCONTINUED | OUTPATIENT
Start: 2019-01-09 | End: 2019-01-16

## 2019-01-09 RX ORDER — FAMOTIDINE 10 MG/ML
0 INJECTION INTRAVENOUS
Qty: 0 | Refills: 0 | COMMUNITY

## 2019-01-09 RX ORDER — CIPROFLOXACIN LACTATE 400MG/40ML
400 VIAL (ML) INTRAVENOUS EVERY 12 HOURS
Qty: 0 | Refills: 0 | Status: DISCONTINUED | OUTPATIENT
Start: 2019-01-09 | End: 2019-01-11

## 2019-01-09 RX ORDER — METRONIDAZOLE 500 MG
500 TABLET ORAL EVERY 8 HOURS
Qty: 0 | Refills: 0 | Status: DISCONTINUED | OUTPATIENT
Start: 2019-01-09 | End: 2019-01-11

## 2019-01-09 RX ORDER — ACETAMINOPHEN 500 MG
650 TABLET ORAL ONCE
Qty: 0 | Refills: 0 | Status: COMPLETED | OUTPATIENT
Start: 2019-01-09 | End: 2019-01-09

## 2019-01-09 RX ORDER — CIPROFLOXACIN LACTATE 400MG/40ML
400 VIAL (ML) INTRAVENOUS ONCE
Qty: 0 | Refills: 0 | Status: COMPLETED | OUTPATIENT
Start: 2019-01-09 | End: 2019-01-09

## 2019-01-09 RX ORDER — HEPARIN SODIUM 5000 [USP'U]/ML
5000 INJECTION INTRAVENOUS; SUBCUTANEOUS EVERY 8 HOURS
Qty: 0 | Refills: 0 | Status: DISCONTINUED | OUTPATIENT
Start: 2019-01-09 | End: 2019-01-16

## 2019-01-09 RX ORDER — OMEPRAZOLE 10 MG/1
1 CAPSULE, DELAYED RELEASE ORAL
Qty: 0 | Refills: 0 | COMMUNITY

## 2019-01-09 RX ADMIN — Medication 100 MILLIGRAM(S): at 22:05

## 2019-01-09 RX ADMIN — Medication 650 MILLIGRAM(S): at 06:31

## 2019-01-09 RX ADMIN — Medication 650 MILLIGRAM(S): at 02:12

## 2019-01-09 RX ADMIN — Medication 100 MILLIGRAM(S): at 06:06

## 2019-01-09 RX ADMIN — Medication 50 MILLIGRAM(S): at 18:08

## 2019-01-09 RX ADMIN — SODIUM CHLORIDE 50 MILLILITER(S): 9 INJECTION INTRAMUSCULAR; INTRAVENOUS; SUBCUTANEOUS at 23:45

## 2019-01-09 RX ADMIN — ATORVASTATIN CALCIUM 40 MILLIGRAM(S): 80 TABLET, FILM COATED ORAL at 22:05

## 2019-01-09 RX ADMIN — Medication 200 MILLIGRAM(S): at 07:00

## 2019-01-09 RX ADMIN — HEPARIN SODIUM 5000 UNIT(S): 5000 INJECTION INTRAVENOUS; SUBCUTANEOUS at 14:12

## 2019-01-09 RX ADMIN — HEPARIN SODIUM 5000 UNIT(S): 5000 INJECTION INTRAVENOUS; SUBCUTANEOUS at 22:04

## 2019-01-09 RX ADMIN — SODIUM CHLORIDE 500 MILLILITER(S): 9 INJECTION INTRAMUSCULAR; INTRAVENOUS; SUBCUTANEOUS at 06:31

## 2019-01-09 RX ADMIN — SEVELAMER CARBONATE 800 MILLIGRAM(S): 2400 POWDER, FOR SUSPENSION ORAL at 18:13

## 2019-01-09 RX ADMIN — SODIUM CHLORIDE 500 MILLILITER(S): 9 INJECTION INTRAMUSCULAR; INTRAVENOUS; SUBCUTANEOUS at 08:47

## 2019-01-09 RX ADMIN — Medication 100 MILLIGRAM(S): at 14:13

## 2019-01-09 RX ADMIN — Medication 400 MILLIGRAM(S): at 08:47

## 2019-01-09 RX ADMIN — Medication 200 MILLIGRAM(S): at 18:07

## 2019-01-09 NOTE — ED ADULT NURSE NOTE - PMH
Chronic kidney disease    Hypertension    Pacemaker Chronic kidney disease    Gout    Hypertension    Pacemaker

## 2019-01-09 NOTE — ED PROVIDER NOTE - OBJECTIVE STATEMENT
84 yo F with PMHx of CKD, gout, HTN, PPM and CHF presents to the ED c/o lower abdominal pain and dysuria x 2 days. Symptoms are persisting so she came to ED for evaluation. Pt denies other complaints. Pt denies fever, chills, nausea, vomiting, diarrhea, headache, dizziness, weakness, chest pain, SOB, back pain, LOC, trauma, cough, calf pain/swelling, recent travel, recent surgery.

## 2019-01-09 NOTE — H&P ADULT - ASSESSMENT
83 yr old female with pmhx of HTN, CKD 4, recent DVT/PE (thought provoked by travel, completed 6mo a/c and no longer on Eliquis) CHF s/p PPM, sigmoid diverticulitis treated with antibiotics p/w lower abdominal pain x 1 D duration. CTAP revealed sigmoid diverticulitis.    # Acute sigmoid diverticulitis:  - admit to medicine  - cipro and flagyl IV  - Keep NPO until evaluated by GI    # OMERO on CKD 4:  - Cr baseline is 1.5  - OMERO likely pre-renal, will start on gentle hydration    # Chronic HFpEF/ G2DD:  - stable, stop lasix for now.  - re-evaluate in AM, if OMERO resolved then start on home dose of lasix    # Chronic normocytic anemia:  - baseline hb 9-10.   - c/w iron pills    # Gout:  - no flare  - not on any meds    # from home  DVT ppx with sqh  NPO until seen by GI 83 yr old female with pmhx of HTN, CKD 4, recent DVT/PE (thought provoked by travel, completed 6mo a/c and no longer on Eliquis) CHF s/p PPM, sigmoid diverticulitis treated with antibiotics p/w lower abdominal pain x 1 D duration. CTAP revealed sigmoid diverticulitis.    # Acute sigmoid diverticulitis:  - admit to medicine  - cipro and flagyl IV  - Keep NPO until evaluated by GI    # OMERO on CKD 4:  - Cr baseline is 1.5  - OMERO likely pre-renal, will start on gentle hydration    # Chronic HFpEF/ G2DD:  - stable, stop lasix for now.  - re-evaluate in AM, if OMERO resolved then start on home dose of lasix    # Chronic normocytic anemia:  - baseline hb 9-10.   - not taking iron pills d/t insurance issues.     # Gout:  - no flare  - not on any meds    # from home  DVT ppx with sqh  NPO until seen by GI

## 2019-01-09 NOTE — H&P ADULT - NSHPREVIEWOFSYSTEMS_GEN_ALL_CORE
REVIEW OF SYSTEMS:    CONSTITUTIONAL: No weakness, fevers or chills  EYES/ENT: No visual changes;  No vertigo or throat pain   NECK: No pain or stiffness  RESPIRATORY: no cough/sob  CARDIOVASCULAR: No chest pain or palpitations  GASTROINTESTINAL: lower abdominal pain +. No nausea, vomiting, or hematemesis; No diarrhea or constipation. No melena or hematochezia.  GENITOURINARY: No dysuria, frequency or hematuria  NEUROLOGICAL: No numbness or weakness  SKIN: No itching, rashes

## 2019-01-09 NOTE — ED PROVIDER NOTE - ATTENDING CONTRIBUTION TO CARE
pacer, htn, gout, diverticulitis, hchol,   Lower AP and dysuria.  no n/v/d. no fever.  Some generalized weakness today. 82 y/o F with pacer, htn, gout, diverticulitis, hchol - here for lower AP and dysuria x 2 days.  No n/v/d.  No fever.  No cp, sob.  Mild lower back pain. No leg pain or swelling.  EXAM: well appearing. NAD. s1s2, reg. CTAB. abd soft, nd, + LLQ ttp.  No guarding or rebound.  P: labs, ekg, cxr, ua, CT a/p.

## 2019-01-09 NOTE — H&P ADULT - HISTORY OF PRESENT ILLNESS
83 yr old female with pmhx of HTN, CKD 4, recent DVT/PE (thought provoked by travel, completed 6mo a/c and no longer on Eliquis) CHF s/p PPM, sigmoid diverticulitis treated with antibiotics p/w lower abdominal pain x 1 D duration. Acute, mild to moderate, constant, dull, suprapubic and left lower abdominal a/w burning urination but no fever,  diarrhoea or constipation.     She was admitted in hospital in november 2018 for CHF exacerbation and course was complicated by Sigmoid diverticulitis and she was treated with antibiotics.

## 2019-01-09 NOTE — ED ADULT NURSE NOTE - OBJECTIVE STATEMENT
patient complaining of abdominal pain x2 days with pain upon urination. denies fevers, nausea, vomiting, diarrhea. patient complaining of R arm pain as well

## 2019-01-09 NOTE — ED ADULT NURSE NOTE - NSIMPLEMENTINTERV_GEN_ALL_ED
Implemented All Universal Safety Interventions:  [place] to call system. Call bell, personal items and telephone within reach. Instruct patient to call for assistance. Room bathroom lighting operational. Non-slip footwear when patient is off stretcher. Physically safe environment: no spills, clutter or unnecessary equipment. Stretcher in lowest position, wheels locked, appropriate side rails in place.

## 2019-01-09 NOTE — ED PROVIDER NOTE - PHYSICAL EXAMINATION
VITAL SIGNS: I have reviewed nursing notes and confirm.  CONSTITUTIONAL: Elderly female laying on stretcher; in no acute distress.  SKIN: Skin exam is warm and dry, no acute rash.  HEAD: Normocephalic; atraumatic.  EYES: Conjunctiva and sclera clear.  ENT: No nasal discharge; airway clear.  NECK: Supple; non tender.  CARD: S1, S2 normal; no murmurs, gallops, or rubs. Regular rate and rhythm.  RESP: No wheezes, rales or rhonchi. Speaking in full sentences.   ABD: Normal bowel sounds; soft; non-distended; (+) suprapubic and LLQ TTP; No rebound or guarding. No CVA tenderness.  EXT: Normal ROM.  NEURO: Alert, oriented. Grossly unremarkable. No focal deficits.

## 2019-01-09 NOTE — H&P ADULT - NSHPPHYSICALEXAM_GEN_ALL_CORE
GENERAL: NAD, well-developed  HEAD:  Atraumatic, Normocephalic  EYES: EOMI, PERRLA, conjunctiva and sclera clear  NECK: Supple, No JVD  CHEST/LUNG: Clear to auscultation bilaterally; No wheeze  HEART: Regular rate and rhythm; No murmurs, rubs, or gallops  ABDOMEN: lower abdominal mild tenderness; Bowel sounds present  EXTREMITIES:  2+ Peripheral Pulses, No clubbing, cyanosis, or edema  PSYCH: AAOx3  NEUROLOGY: non-focal  SKIN: No rashes or lesions

## 2019-01-09 NOTE — H&P ADULT - ATTENDING COMMENTS
83 yr old female with PMH of HTN, CKD 4, recent DVT/PE (thought provoked by travel, completed 6mo a/c and no longer on Eliquis) CHF s/p PPM, sigmoid diverticulitis treated with antibiotics p/w lower abdominal pain x 1 D duration. Acute, mild to moderate, constant, dull, suprapubic and left lower abdominal a/w burning urination but no fever,  diarrhoea or constipation.     She was admitted in hospital in november 2018 for CHF exacerbation and course was complicated by Sigmoid diverticulitis and she was treated with antibiotics.      Patient examined in ED and history read.  Has acute Sigmoid Diverticulitis which needs to be treated with IV antibiotics.  GI called  for further recommendation.

## 2019-01-09 NOTE — H&P ADULT - NSHPLABSRESULTS_GEN_ALL_CORE
8.3    .07 )-----------( 274      ( 2019 01:11 )             26.5           142  |  102  |  46<H>  ----------------------------<  91  4.3   |  24  |  1.9<H>    Ca    9.1      2019 01:11    TPro  6.3  /  Alb  3.5  /  TBili  0.3  /  DBili  x   /  AST  25  /  ALT  17  /  AlkPhos  70            Urinalysis Basic - ( 2019 04:20 )    Color: Yellow / Appearance: Clear / S.015 / pH: x  Gluc: x / Ketone: Negative  / Bili: Negative / Urobili: 0.2 mg/dL   Blood: x / Protein: 100 mg/dL / Nitrite: Negative   Leuk Esterase: Negative / RBC: x / WBC x   Sq Epi: x / Non Sq Epi: Few /HPF / Bacteria: x      Lactate Trend   @ 01:11 Lactate:0.6       < from: CT Abdomen and Pelvis No Cont (19 @ 05:09) >    IMPRESSION:  Acute sigmoid diverticulitis. No abscess.    < end of copied text >    < from: 12 Lead ECG (19 @ 01:34) >    Ventricular Rate 75 BPM    Atrial Rate 75 BPM    P-R Interval 128 ms    QRS Duration 110 ms    Q-T Interval 436 ms    QTC Calculation(Bezet) 486 ms    P Axis 59 degrees    R Axis 33 degrees    T Axis -83 degrees    Diagnosis Line Atrial-sensed ventricular-paced rhythm  Abnormal ECG    < end of copied text >    < from: Transthoracic Echocardiogram (18 @ 09:56) >    Summary:   1. Left ventricular ejection fraction, by visual estimation, is 55 to   60%.   2. Normal global left ventricular systolic function.   3. Spectral Doppler shows pseudonormal pattern of left ventricular   myocardial filling (Grade II diastolic dysfunction).   4. Mild-to-moderate concentric left ventricular hypertrophy.   5. Mild mitral regurgitation.   6. Mild tricuspid regurgitation.   7. Estimated pulmonary artery systolic pressure is 42.1 mmHg assuming a   right atrial pressure of 8 mmHg, which is consistent with mild pulmonary   hypertension.    < end of copied text >

## 2019-01-09 NOTE — PATIENT PROFILE ADULT - FALL HARM RISK
HX of severasl falls in past/ last being 2 weeks prior to admission/ uses walker and cane with assitance/ pain to LE/other

## 2019-01-10 PROBLEM — K57.92 DIVERTICULITIS OF INTESTINE, PART UNSPECIFIED, WITHOUT PERFORATION OR ABSCESS WITHOUT BLEEDING: Chronic | Status: ACTIVE | Noted: 2019-01-09

## 2019-01-10 LAB
ALBUMIN SERPL ELPH-MCNC: 3.3 G/DL — LOW (ref 3.5–5.2)
ALP SERPL-CCNC: 102 U/L — SIGNIFICANT CHANGE UP (ref 30–115)
ALT FLD-CCNC: 19 U/L — SIGNIFICANT CHANGE UP (ref 0–41)
ANION GAP SERPL CALC-SCNC: 15 MMOL/L — HIGH (ref 7–14)
AST SERPL-CCNC: 31 U/L — SIGNIFICANT CHANGE UP (ref 0–41)
BASOPHILS # BLD AUTO: 0.03 K/UL — SIGNIFICANT CHANGE UP (ref 0–0.2)
BASOPHILS NFR BLD AUTO: 0.3 % — SIGNIFICANT CHANGE UP (ref 0–1)
BILIRUB SERPL-MCNC: 0.3 MG/DL — SIGNIFICANT CHANGE UP (ref 0.2–1.2)
BUN SERPL-MCNC: 33 MG/DL — HIGH (ref 10–20)
CALCIUM SERPL-MCNC: 8.8 MG/DL — SIGNIFICANT CHANGE UP (ref 8.5–10.1)
CHLORIDE SERPL-SCNC: 100 MMOL/L — SIGNIFICANT CHANGE UP (ref 98–110)
CO2 SERPL-SCNC: 23 MMOL/L — SIGNIFICANT CHANGE UP (ref 17–32)
CREAT SERPL-MCNC: 1.6 MG/DL — HIGH (ref 0.7–1.5)
CULTURE RESULTS: SIGNIFICANT CHANGE UP
EOSINOPHIL # BLD AUTO: 0.15 K/UL — SIGNIFICANT CHANGE UP (ref 0–0.7)
EOSINOPHIL NFR BLD AUTO: 1.5 % — SIGNIFICANT CHANGE UP (ref 0–8)
GLUCOSE SERPL-MCNC: 101 MG/DL — HIGH (ref 70–99)
HCT VFR BLD CALC: 30.2 % — LOW (ref 37–47)
HGB BLD-MCNC: 9.3 G/DL — LOW (ref 12–16)
IMM GRANULOCYTES NFR BLD AUTO: 0.5 % — HIGH (ref 0.1–0.3)
LYMPHOCYTES # BLD AUTO: 1.86 K/UL — SIGNIFICANT CHANGE UP (ref 1.2–3.4)
LYMPHOCYTES # BLD AUTO: 18.2 % — LOW (ref 20.5–51.1)
MCHC RBC-ENTMCNC: 28.4 PG — SIGNIFICANT CHANGE UP (ref 27–31)
MCHC RBC-ENTMCNC: 30.8 G/DL — LOW (ref 32–37)
MCV RBC AUTO: 92.1 FL — SIGNIFICANT CHANGE UP (ref 81–99)
MONOCYTES # BLD AUTO: 1.03 K/UL — HIGH (ref 0.1–0.6)
MONOCYTES NFR BLD AUTO: 10.1 % — HIGH (ref 1.7–9.3)
NEUTROPHILS # BLD AUTO: 7.12 K/UL — HIGH (ref 1.4–6.5)
NEUTROPHILS NFR BLD AUTO: 69.4 % — SIGNIFICANT CHANGE UP (ref 42.2–75.2)
PLATELET # BLD AUTO: 309 K/UL — SIGNIFICANT CHANGE UP (ref 130–400)
POTASSIUM SERPL-MCNC: 5.2 MMOL/L — HIGH (ref 3.5–5)
POTASSIUM SERPL-SCNC: 5.2 MMOL/L — HIGH (ref 3.5–5)
PROT SERPL-MCNC: 6.2 G/DL — SIGNIFICANT CHANGE UP (ref 6–8)
RBC # BLD: 3.28 M/UL — LOW (ref 4.2–5.4)
RBC # FLD: 15.3 % — HIGH (ref 11.5–14.5)
SODIUM SERPL-SCNC: 138 MMOL/L — SIGNIFICANT CHANGE UP (ref 135–146)
SPECIMEN SOURCE: SIGNIFICANT CHANGE UP
TROPONIN T SERPL-MCNC: 0.02 NG/ML — HIGH
TYPE + AB SCN PNL BLD: SIGNIFICANT CHANGE UP
WBC # BLD: 10.24 K/UL — SIGNIFICANT CHANGE UP (ref 4.8–10.8)
WBC # FLD AUTO: 10.24 K/UL — SIGNIFICANT CHANGE UP (ref 4.8–10.8)

## 2019-01-10 RX ORDER — ALBUTEROL 90 UG/1
2 AEROSOL, METERED ORAL EVERY 4 HOURS
Qty: 0 | Refills: 0 | Status: COMPLETED | OUTPATIENT
Start: 2019-01-10 | End: 2019-12-09

## 2019-01-10 RX ORDER — IPRATROPIUM/ALBUTEROL SULFATE 18-103MCG
3 AEROSOL WITH ADAPTER (GRAM) INHALATION EVERY 4 HOURS
Qty: 0 | Refills: 0 | Status: DISCONTINUED | OUTPATIENT
Start: 2019-01-10 | End: 2019-01-16

## 2019-01-10 RX ADMIN — Medication 100 MILLIGRAM(S): at 14:34

## 2019-01-10 RX ADMIN — SEVELAMER CARBONATE 800 MILLIGRAM(S): 2400 POWDER, FOR SUSPENSION ORAL at 11:44

## 2019-01-10 RX ADMIN — Medication 40 MILLIGRAM(S): at 11:44

## 2019-01-10 RX ADMIN — LATANOPROST 1 DROP(S): 0.05 SOLUTION/ DROPS OPHTHALMIC; TOPICAL at 00:46

## 2019-01-10 RX ADMIN — HEPARIN SODIUM 5000 UNIT(S): 5000 INJECTION INTRAVENOUS; SUBCUTANEOUS at 14:35

## 2019-01-10 RX ADMIN — Medication 200 MILLIGRAM(S): at 17:55

## 2019-01-10 RX ADMIN — Medication 100 MILLIGRAM(S): at 05:43

## 2019-01-10 RX ADMIN — Medication 100 MILLIGRAM(S): at 02:24

## 2019-01-10 RX ADMIN — HEPARIN SODIUM 5000 UNIT(S): 5000 INJECTION INTRAVENOUS; SUBCUTANEOUS at 21:37

## 2019-01-10 RX ADMIN — Medication 50 MILLIGRAM(S): at 17:56

## 2019-01-10 RX ADMIN — CALCITRIOL 0.25 MICROGRAM(S): 0.5 CAPSULE ORAL at 11:44

## 2019-01-10 RX ADMIN — PANTOPRAZOLE SODIUM 40 MILLIGRAM(S): 20 TABLET, DELAYED RELEASE ORAL at 11:44

## 2019-01-10 RX ADMIN — Medication 3 MILLILITER(S): at 10:01

## 2019-01-10 RX ADMIN — Medication 100 MILLIGRAM(S): at 21:37

## 2019-01-10 RX ADMIN — Medication 50 MILLIGRAM(S): at 05:42

## 2019-01-10 RX ADMIN — Medication 3 MILLILITER(S): at 20:20

## 2019-01-10 RX ADMIN — LATANOPROST 1 DROP(S): 0.05 SOLUTION/ DROPS OPHTHALMIC; TOPICAL at 21:37

## 2019-01-10 RX ADMIN — Medication 200 MILLIGRAM(S): at 05:43

## 2019-01-10 RX ADMIN — HEPARIN SODIUM 5000 UNIT(S): 5000 INJECTION INTRAVENOUS; SUBCUTANEOUS at 05:42

## 2019-01-10 RX ADMIN — ATORVASTATIN CALCIUM 40 MILLIGRAM(S): 80 TABLET, FILM COATED ORAL at 21:37

## 2019-01-10 RX ADMIN — Medication 1 MILLIGRAM(S): at 11:44

## 2019-01-10 RX ADMIN — SEVELAMER CARBONATE 800 MILLIGRAM(S): 2400 POWDER, FOR SUSPENSION ORAL at 17:56

## 2019-01-10 NOTE — PROGRESS NOTE ADULT - SUBJECTIVE AND OBJECTIVE BOX
RACHEL SUMMERS 83y Female  MRN#: [id number]         SUBJECTIVE  Patient is a 83y old Female who presents with a chief complaint of lower abdominal pain (10 Navin 2019 09:21)  Currently admitted to medicine with the primary diagnosis of Diverticulitis    Today is hospital day 1d, and this morning pt is complaining of some shortness of breath and wheezing. Otherwise, comfortable.        OBJECTIVE  PAST MEDICAL & SURGICAL HISTORY  Diverticulitis: sigmoid  Gout  Hypertension  Pacemaker  Chronic kidney disease  Artificial pacemaker    ALLERGIES:  Keflex (Unknown)    MEDICATIONS:  STANDING MEDICATIONS  ALBUTerol/ipratropium for Nebulization 3 milliLiter(s) Nebulizer every 4 hours  atorvastatin 40 milliGRAM(s) Oral at bedtime  calcitriol   Capsule 0.25 MICROGram(s) Oral daily  ciprofloxacin   IVPB 400 milliGRAM(s) IV Intermittent every 12 hours  folic acid 1 milliGRAM(s) Oral daily  heparin  Injectable 5000 Unit(s) SubCutaneous every 8 hours  latanoprost 0.005% Ophthalmic Solution 1 Drop(s) Both EYES at bedtime  metoprolol tartrate 50 milliGRAM(s) Oral two times a day  metroNIDAZOLE  IVPB 500 milliGRAM(s) IV Intermittent every 8 hours  pantoprazole    Tablet 40 milliGRAM(s) Oral before breakfast  sevelamer hydrochloride 800 milliGRAM(s) Oral three times a day with meals    PRN MEDICATIONS  ALBUTerol    90 MICROgram(s) HFA Inhaler 2 Puff(s) Inhalation every 4 hours PRN  guaiFENesin    Syrup 100 milliGRAM(s) Oral every 6 hours PRN      Home Medications:  atorvastatin 40 mg oral tablet: 1 tab(s) orally once a day (2019 13:30)  calcitriol 0.25 mcg oral capsule: 1 cap(s) orally once a day (2019 13:30)  folic acid 0.4 mg oral tablet: 1 tab(s) orally once a day (2019 13:30)  Lasix 40 mg oral tablet: 1 tab(s) orally once a day (2019 13:30)  latanoprost 0.005% ophthalmic solution: 1 drop(s) to each affected eye once a day (in the evening) (2019 13:30)  Lopressor 50 mg oral tablet: 1 tab(s) orally 2 times a day (2019 13:30)  sevelamer hydrochloride 800 mg oral tablet: 1 tab(s) orally 3 times a day (with meals) (2019 13:30)      VITAL SIGNS: Last 24 Hours  T(C): 37.4 (10 Navin 2019 12:26), Max: 37.8 (10 Navin 2019 04:53)  T(F): 99.4 (10 Navin 2019 12:26), Max: 100 (10 Navin 2019 04:53)  HR: 79 (10 Navin 2019 12:26) (75 - 85)  BP: 153/72 (10 Navin 2019 12:26) (135/64 - 173/83)  BP(mean): --  RR: 18 (10 Navin 2019 12:26) (18 - 18)  SpO2: 97% (10 Navin 2019 01:45) (97% - 98%)    LABS:                        9.3    10.24 )-----------( 309      ( 10 Navin 2019 08:17 )             30.2     01-10    138  |  100  |  33<H>  ----------------------------<  101<H>  5.2<H>   |  23  |  1.6<H>    Ca    8.8      10 Navin 2019 08:17  Mg     1.8     -09    TPro  6.2  /  Alb  3.3<L>  /  TBili  0.3  /  DBili  x   /  AST  31  /  ALT  19  /  AlkPhos  102  01-10      Urinalysis Basic - ( 2019 04:20 )    Color: Yellow / Appearance: Clear / S.015 / pH: x  Gluc: x / Ketone: Negative  / Bili: Negative / Urobili: 0.2 mg/dL   Blood: x / Protein: 100 mg/dL / Nitrite: Negative   Leuk Esterase: Negative / RBC: x / WBC x   Sq Epi: x / Non Sq Epi: Few /HPF / Bacteria: x            Culture - Urine (collected 2019 04:20)  Source: .Urine Clean Catch (Midstream)  Final Report (10 Navin 2019 10:52):    Culture grew 3 or more types of organisms which indicate    collection contamination; consider recollection only if clinically    indicated.          I&O's Summary    2019 07:01  -  10 Navin 2019 07:00  --------------------------------------------------------  IN: 0 mL / OUT: 950 mL / NET: -950 mL          PHYSICAL EXAM:    General: Not in distress.   HEENT: Moist mucus membranes.   Cardio: Regular rate and rhythm, S1, S2, no murmur  Pulm: b/l air entry  Abdomen: mild abdominal tenderness  Extremities: No cyanosis or edema bilaterally.   Neuro: A&O x3. No focal deficits.       RADIOLOGY:    < from: Xray Chest 1 View- PORTABLE-Urgent (01.10.19 @ 10:42) >  Impression:      Cardiomegaly with small effusions.    Unchanged.      < end of copied text >    < from: CT Abdomen and Pelvis No Cont (19 @ 05:09) >    IMPRESSION:  Acute sigmoid diverticulitis. No abscess.      < end of copied text >      < from: US Renal (18 @ 09:55) >  IMPRESSION:    Mild bilateral echogenicity most consistent with medicalrenal disease.    No sonographic evidence for hydronephrosis or calculus bilaterally.    Stable 1.0 cm hypoechoic structure, likely right renal cyst.    Trace right perinephric fluid.    < end of copied text >

## 2019-01-10 NOTE — PROGRESS NOTE ADULT - ASSESSMENT
83 yr old female with pmhx of HTN, CKD 4, recent DVT/PE (thought provoked by travel, completed 6mo a/c and no longer on Eliquis) CHF s/p PPM, sigmoid diverticulitis treated with antibiotics p/w lower abdominal pain x 1 D duration. CTAP revealed sigmoid diverticulitis.    # Acute sigmoid diverticulitis:  - c/w cipro and flagyl IV  - Pt NPO at this time  - F/u GI consult    # OMERO on CKD 4 - improving  - Cr baseline is 1.5  - was likely pre-renal; improved on IVF    # Chronic HFpEF/ G2DD:  - stable,   - Lasix was stopped on admission given OMERO. Will resume tmrw if kidney function continues to improve..    # Chronic normocytic anemia:  - baseline hb 9-10.   - not taking iron pills d/t insurance issues.     # Gout:  - stable, not on meds    # from home  DVT ppx: Heparin SQ  NPO for now 83 yr old female with pmhx of HTN, CKD 4, recent DVT/PE (thought provoked by travel, completed 6mo a/c and no longer on Eliquis) CHF s/p PPM, sigmoid diverticulitis treated with antibiotics p/w lower abdominal pain x 1 D duration. CTAP revealed sigmoid diverticulitis.    # Acute sigmoid diverticulitis:  - c/w cipro and flagyl IV  - Pt NPO at this time  - F/u GI consult     # OMERO on CKD 4 - improving  - Cr baseline is 1.5  - was likely pre-renal; improved on IVF    # Chronic HFpEF/ G2DD:  - stable,   - Lasix was stopped on admission given OMERO. Will resume tmrw if kidney function continues to improve..    # Chronic normocytic anemia:  - baseline hb 9-10.   - not taking iron pills d/t insurance issues.     # Gout:  - stable, not on meds    # from home  DVT ppx: Heparin SQ  NPO for now

## 2019-01-10 NOTE — PROGRESS NOTE ADULT - ASSESSMENT
83 yr old female with pmhx of HTN, CKD 4, recent DVT/PE (thought provoked by travel, completed 6mo a/c and no longer on Eliquis) CHF s/p PPM, sigmoid diverticulitis treated with antibiotics p/w lower abdominal pain x 1 D duration. CTAP revealed sigmoid diverticulitis.    # Acute sigmoid diverticulitis:  - admit to medicine  - cipro and flagyl IV  - Keep NPO until evaluated by GI    # OMERO on CKD 4:  - Cr baseline is 1.5  - OMERO likely pre-renal, will start on gentle hydration    # Chronic HFpEF/ G2DD:  - stable, stop lasix for now.  - re-evaluate in AM, if OMERO resolved then start on home dose of lasix    # Chronic normocytic anemia:      # COPD/URI   add mini-neb tx albuterol and atrovent , IV Solumedrol and CXR  - baseline hb 9-10.   - not taking iron pills d/t insurance issues.     # Gout:  - no flare  - not on any meds    # from home  DVT ppx with sqh  NPO until seen by GI

## 2019-01-10 NOTE — PROGRESS NOTE ADULT - SUBJECTIVE AND OBJECTIVE BOX
LENGTH OF HOSPITAL STAY: 1d    CHIEF COMPLAINT:   Patient is a 83y old  Female who presents with a chief complaint of lower abdominal pain (2019 12:16)      HISTORY OF PRESENTING ILLNESS:    HPI:  83 yr old female with pmhx of HTN, CKD 4, recent DVT/PE (thought provoked by travel, completed 6mo a/c and no longer on Eliquis) CHF s/p PPM, sigmoid diverticulitis treated with antibiotics p/w lower abdominal pain x 1 D duration. Acute, mild to moderate, constant, dull, suprapubic and left lower abdominal a/w burning urination but no fever,  diarrhoea or constipation.     She was admitted in hospital in 2018 for CHF exacerbation and course was complicated by Sigmoid diverticulitis and she was treated with antibiotics. (2019 12:16)      Patient has chest discomfort this am with cough and wheezing.  Mini-nebs were ordered.        PAST MEDICAL & SURGICAL HISTORY  PAST MEDICAL & SURGICAL HISTORY:  Diverticulitis: sigmoid  Gout  Hypertension  Pacemaker  Chronic kidney disease  Artificial pacemaker    SOCIAL HISTORY:    ALLERGIES:  Keflex (Unknown)    MEDICATIONS:  STANDING MEDICATIONS  ALBUTerol/ipratropium for Nebulization 3 milliLiter(s) Nebulizer every 4 hours  atorvastatin 40 milliGRAM(s) Oral at bedtime  calcitriol   Capsule 0.25 MICROGram(s) Oral daily  ciprofloxacin   IVPB 400 milliGRAM(s) IV Intermittent every 12 hours  folic acid 1 milliGRAM(s) Oral daily  heparin  Injectable 5000 Unit(s) SubCutaneous every 8 hours  latanoprost 0.005% Ophthalmic Solution 1 Drop(s) Both EYES at bedtime  methylPREDNISolone sodium succinate Injectable 40 milliGRAM(s) IV Push once  metoprolol tartrate 50 milliGRAM(s) Oral two times a day  metroNIDAZOLE  IVPB 500 milliGRAM(s) IV Intermittent every 8 hours  pantoprazole    Tablet 40 milliGRAM(s) Oral before breakfast  sevelamer hydrochloride 800 milliGRAM(s) Oral three times a day with meals    PRN MEDICATIONS  ALBUTerol    90 MICROgram(s) HFA Inhaler 2 Puff(s) Inhalation every 4 hours PRN  guaiFENesin    Syrup 100 milliGRAM(s) Oral every 6 hours PRN    VITALS:   T(F): 100  HR: 85  BP: 138/71  RR: 18  SpO2: 97%    LABS:                        9.3    10.24 )-----------( 309      ( 10 Navin 2019 08:17 )             30.2         141  |  103  |  42<H>  ----------------------------<  84  4.9   |  23  |  1.7<H>    Ca    9.1      2019 11:41  Mg     1.8         TPro  6.3  /  Alb  3.5  /  TBili  0.3  /  DBili  x   /  AST  25  /  ALT  17  /  AlkPhos  70        Urinalysis Basic - ( 2019 04:20 )    Color: Yellow / Appearance: Clear / S.015 / pH: x  Gluc: x / Ketone: Negative  / Bili: Negative / Urobili: 0.2 mg/dL   Blood: x / Protein: 100 mg/dL / Nitrite: Negative   Leuk Esterase: Negative / RBC: x / WBC x   Sq Epi: x / Non Sq Epi: Few /HPF / Bacteria: x                RADIOLOGY:    PHYSICAL EXAM:  GEN: No acute distress  HEENT:   LUNGS: Clear to auscultation bilaterally   HEART: S1/S2 present. RRR.   ABD: Soft, non-tender, non-distended. Bowel sounds present  EXT:  NEURO: AAOX3

## 2019-01-11 ENCOUNTER — TRANSCRIPTION ENCOUNTER (OUTPATIENT)
Age: 84
End: 2019-01-11

## 2019-01-11 LAB
ALBUMIN SERPL ELPH-MCNC: 3 G/DL — LOW (ref 3.5–5.2)
ALP SERPL-CCNC: 79 U/L — SIGNIFICANT CHANGE UP (ref 30–115)
ALT FLD-CCNC: 15 U/L — SIGNIFICANT CHANGE UP (ref 0–41)
ANION GAP SERPL CALC-SCNC: 19 MMOL/L — HIGH (ref 7–14)
AST SERPL-CCNC: 23 U/L — SIGNIFICANT CHANGE UP (ref 0–41)
BASOPHILS # BLD AUTO: 0.02 K/UL — SIGNIFICANT CHANGE UP (ref 0–0.2)
BASOPHILS NFR BLD AUTO: 0.1 % — SIGNIFICANT CHANGE UP (ref 0–1)
BILIRUB SERPL-MCNC: 0.2 MG/DL — SIGNIFICANT CHANGE UP (ref 0.2–1.2)
BUN SERPL-MCNC: 32 MG/DL — HIGH (ref 10–20)
CALCIUM SERPL-MCNC: 8.3 MG/DL — LOW (ref 8.5–10.1)
CHLORIDE SERPL-SCNC: 98 MMOL/L — SIGNIFICANT CHANGE UP (ref 98–110)
CK MB CFR SERPL CALC: 2.6 NG/ML — SIGNIFICANT CHANGE UP (ref 0.6–6.3)
CK SERPL-CCNC: 98 U/L — SIGNIFICANT CHANGE UP (ref 0–225)
CO2 SERPL-SCNC: 20 MMOL/L — SIGNIFICANT CHANGE UP (ref 17–32)
CREAT SERPL-MCNC: 1.8 MG/DL — HIGH (ref 0.7–1.5)
EOSINOPHIL # BLD AUTO: 0.04 K/UL — SIGNIFICANT CHANGE UP (ref 0–0.7)
EOSINOPHIL NFR BLD AUTO: 0.3 % — SIGNIFICANT CHANGE UP (ref 0–8)
GLUCOSE BLDC GLUCOMTR-MCNC: 151 MG/DL — HIGH (ref 70–99)
GLUCOSE SERPL-MCNC: 111 MG/DL — HIGH (ref 70–99)
HCT VFR BLD CALC: 25.4 % — LOW (ref 37–47)
HCT VFR BLD CALC: 28.8 % — LOW (ref 37–47)
HGB BLD-MCNC: 8.1 G/DL — LOW (ref 12–16)
HGB BLD-MCNC: 9.2 G/DL — LOW (ref 12–16)
IMM GRANULOCYTES NFR BLD AUTO: 1.3 % — HIGH (ref 0.1–0.3)
LACTATE SERPL-SCNC: 2.2 MMOL/L — SIGNIFICANT CHANGE UP (ref 0.5–2.2)
LYMPHOCYTES # BLD AUTO: 0.95 K/UL — LOW (ref 1.2–3.4)
LYMPHOCYTES # BLD AUTO: 6.1 % — LOW (ref 20.5–51.1)
MCHC RBC-ENTMCNC: 28.8 PG — SIGNIFICANT CHANGE UP (ref 27–31)
MCHC RBC-ENTMCNC: 29 PG — SIGNIFICANT CHANGE UP (ref 27–31)
MCHC RBC-ENTMCNC: 31.9 G/DL — LOW (ref 32–37)
MCHC RBC-ENTMCNC: 31.9 G/DL — LOW (ref 32–37)
MCV RBC AUTO: 90.3 FL — SIGNIFICANT CHANGE UP (ref 81–99)
MCV RBC AUTO: 91 FL — SIGNIFICANT CHANGE UP (ref 81–99)
MONOCYTES # BLD AUTO: 1.3 K/UL — HIGH (ref 0.1–0.6)
MONOCYTES NFR BLD AUTO: 8.3 % — SIGNIFICANT CHANGE UP (ref 1.7–9.3)
NEUTROPHILS # BLD AUTO: 13.13 K/UL — HIGH (ref 1.4–6.5)
NEUTROPHILS NFR BLD AUTO: 83.9 % — HIGH (ref 42.2–75.2)
NRBC # BLD: 0 /100 WBCS — SIGNIFICANT CHANGE UP (ref 0–0)
NRBC # BLD: 0 /100 WBCS — SIGNIFICANT CHANGE UP (ref 0–0)
NT-PROBNP SERPL-SCNC: HIGH PG/ML (ref 0–300)
PLATELET # BLD AUTO: 260 K/UL — SIGNIFICANT CHANGE UP (ref 130–400)
PLATELET # BLD AUTO: 321 K/UL — SIGNIFICANT CHANGE UP (ref 130–400)
POTASSIUM SERPL-MCNC: 4.4 MMOL/L — SIGNIFICANT CHANGE UP (ref 3.5–5)
POTASSIUM SERPL-SCNC: 4.4 MMOL/L — SIGNIFICANT CHANGE UP (ref 3.5–5)
PROT SERPL-MCNC: 5.8 G/DL — LOW (ref 6–8)
RBC # BLD: 2.79 M/UL — LOW (ref 4.2–5.4)
RBC # BLD: 3.19 M/UL — LOW (ref 4.2–5.4)
RBC # FLD: 15.2 % — HIGH (ref 11.5–14.5)
RBC # FLD: 15.3 % — HIGH (ref 11.5–14.5)
SODIUM SERPL-SCNC: 137 MMOL/L — SIGNIFICANT CHANGE UP (ref 135–146)
TROPONIN T SERPL-MCNC: 0.03 NG/ML — CRITICAL HIGH
TROPONIN T SERPL-MCNC: 0.03 NG/ML — CRITICAL HIGH
WBC # BLD: 11.76 K/UL — HIGH (ref 4.8–10.8)
WBC # BLD: 15.65 K/UL — HIGH (ref 4.8–10.8)
WBC # FLD AUTO: 11.76 K/UL — HIGH (ref 4.8–10.8)
WBC # FLD AUTO: 15.65 K/UL — HIGH (ref 4.8–10.8)

## 2019-01-11 PROCEDURE — 93970 EXTREMITY STUDY: CPT | Mod: 26

## 2019-01-11 RX ORDER — FUROSEMIDE 40 MG
40 TABLET ORAL ONCE
Qty: 0 | Refills: 0 | Status: DISCONTINUED | OUTPATIENT
Start: 2019-01-11 | End: 2019-01-11

## 2019-01-11 RX ORDER — MOXIFLOXACIN HYDROCHLORIDE TABLETS, 400 MG 400 MG/1
1 TABLET, FILM COATED ORAL
Qty: 20 | Refills: 0 | OUTPATIENT
Start: 2019-01-11 | End: 2019-01-20

## 2019-01-11 RX ORDER — VANCOMYCIN HCL 1 G
750 VIAL (EA) INTRAVENOUS DAILY
Qty: 0 | Refills: 0 | Status: DISCONTINUED | OUTPATIENT
Start: 2019-01-12 | End: 2019-01-15

## 2019-01-11 RX ORDER — APIXABAN 2.5 MG/1
1 TABLET, FILM COATED ORAL
Qty: 60 | Refills: 0 | OUTPATIENT
Start: 2019-01-11 | End: 2019-02-09

## 2019-01-11 RX ORDER — MEROPENEM 1 G/30ML
INJECTION INTRAVENOUS
Qty: 0 | Refills: 0 | Status: DISCONTINUED | OUTPATIENT
Start: 2019-01-11 | End: 2019-01-15

## 2019-01-11 RX ORDER — FUROSEMIDE 40 MG
40 TABLET ORAL ONCE
Qty: 0 | Refills: 0 | Status: COMPLETED | OUTPATIENT
Start: 2019-01-11 | End: 2019-01-11

## 2019-01-11 RX ORDER — ALBUTEROL 90 UG/1
2 AEROSOL, METERED ORAL EVERY 4 HOURS
Qty: 0 | Refills: 0 | Status: DISCONTINUED | OUTPATIENT
Start: 2019-01-11 | End: 2019-01-16

## 2019-01-11 RX ORDER — VANCOMYCIN HCL 1 G
1000 VIAL (EA) INTRAVENOUS ONCE
Qty: 0 | Refills: 0 | Status: COMPLETED | OUTPATIENT
Start: 2019-01-11 | End: 2019-01-11

## 2019-01-11 RX ORDER — METRONIDAZOLE 500 MG
1 TABLET ORAL
Qty: 30 | Refills: 0 | OUTPATIENT
Start: 2019-01-11 | End: 2019-01-20

## 2019-01-11 RX ORDER — MEROPENEM 1 G/30ML
1000 INJECTION INTRAVENOUS ONCE
Qty: 0 | Refills: 0 | Status: COMPLETED | OUTPATIENT
Start: 2019-01-11 | End: 2019-01-11

## 2019-01-11 RX ORDER — MEROPENEM 1 G/30ML
1000 INJECTION INTRAVENOUS EVERY 12 HOURS
Qty: 0 | Refills: 0 | Status: DISCONTINUED | OUTPATIENT
Start: 2019-01-12 | End: 2019-01-15

## 2019-01-11 RX ORDER — APIXABAN 2.5 MG/1
2.5 TABLET, FILM COATED ORAL EVERY 12 HOURS
Qty: 0 | Refills: 0 | Status: DISCONTINUED | OUTPATIENT
Start: 2019-01-11 | End: 2019-01-11

## 2019-01-11 RX ADMIN — CALCITRIOL 0.25 MICROGRAM(S): 0.5 CAPSULE ORAL at 11:27

## 2019-01-11 RX ADMIN — Medication 40 MILLIGRAM(S): at 19:50

## 2019-01-11 RX ADMIN — PANTOPRAZOLE SODIUM 40 MILLIGRAM(S): 20 TABLET, DELAYED RELEASE ORAL at 08:28

## 2019-01-11 RX ADMIN — Medication 3 MILLILITER(S): at 07:53

## 2019-01-11 RX ADMIN — Medication 3 MILLILITER(S): at 17:01

## 2019-01-11 RX ADMIN — Medication 50 MILLIGRAM(S): at 17:42

## 2019-01-11 RX ADMIN — Medication 100 MILLIGRAM(S): at 06:56

## 2019-01-11 RX ADMIN — Medication 200 MILLIGRAM(S): at 05:24

## 2019-01-11 RX ADMIN — Medication 0.5 MILLIGRAM(S): at 17:43

## 2019-01-11 RX ADMIN — Medication 3 MILLILITER(S): at 12:15

## 2019-01-11 RX ADMIN — LATANOPROST 1 DROP(S): 0.05 SOLUTION/ DROPS OPHTHALMIC; TOPICAL at 21:36

## 2019-01-11 RX ADMIN — Medication 100 MILLIGRAM(S): at 17:42

## 2019-01-11 RX ADMIN — ATORVASTATIN CALCIUM 40 MILLIGRAM(S): 80 TABLET, FILM COATED ORAL at 21:34

## 2019-01-11 RX ADMIN — SEVELAMER CARBONATE 800 MILLIGRAM(S): 2400 POWDER, FOR SUSPENSION ORAL at 11:32

## 2019-01-11 RX ADMIN — Medication 50 MILLIGRAM(S): at 05:25

## 2019-01-11 RX ADMIN — SEVELAMER CARBONATE 800 MILLIGRAM(S): 2400 POWDER, FOR SUSPENSION ORAL at 08:28

## 2019-01-11 RX ADMIN — Medication 1 MILLIGRAM(S): at 11:27

## 2019-01-11 RX ADMIN — Medication 250 MILLIGRAM(S): at 22:39

## 2019-01-11 RX ADMIN — Medication 200 MILLIGRAM(S): at 17:43

## 2019-01-11 RX ADMIN — HEPARIN SODIUM 5000 UNIT(S): 5000 INJECTION INTRAVENOUS; SUBCUTANEOUS at 05:24

## 2019-01-11 RX ADMIN — HEPARIN SODIUM 5000 UNIT(S): 5000 INJECTION INTRAVENOUS; SUBCUTANEOUS at 21:35

## 2019-01-11 RX ADMIN — Medication 40 MILLIGRAM(S): at 20:28

## 2019-01-11 RX ADMIN — MEROPENEM 100 MILLIGRAM(S): 1 INJECTION INTRAVENOUS at 21:31

## 2019-01-11 RX ADMIN — Medication 3 MILLILITER(S): at 19:28

## 2019-01-11 RX ADMIN — SEVELAMER CARBONATE 800 MILLIGRAM(S): 2400 POWDER, FOR SUSPENSION ORAL at 17:42

## 2019-01-11 NOTE — CHART NOTE - NSCHARTNOTEFT_GEN_A_CORE
Rapid response was called by nurse due to respiratory wheezing and SOB.    Patient was alert and oriented X3, complaining of SOB and mild chest heaviness.     VS: BP: 180/120 Temp; 102.5 HR: 105 RR: 20  spo2: 98% on 2L NC    Appearance: Normal	  HEENT:   Normal oral mucosa, PERRL, EOMI	  Lymphatic: No lymphadenopathy  Cardiovascular: Normal S1 S2, No JVD, No murmurs, No edema  Respiratory: bilateral crackle of bases	  Psychiatry: A & O x 3, Mood & affect appropriate  Gastrointestinal:  Soft, Non-tender, + BS	  Skin: No rashes, No ecchymoses, No cyanosis	  Neurologic: Non-focal, A&Ox3, nonfocal, JUÁREZ x 4  Extremities: Normal range of motion, No clubbing, cyanosis or edema  Vascular: Peripheral pulses palpable 2+ bilaterally    EKG was ordered stat which showed ventricular paced rhythm    Plan:  - CXR stat  - CBC, BMP, Lactate, Bcx , C.E was sent stat  - Iv lasix 40 mg  - D/c cipro and flagyl  - Vancomycin + meropenem --> first dose stat   - U/a and Ucx  -Tylenol PRN for fever

## 2019-01-11 NOTE — DISCHARGE NOTE ADULT - PLAN OF CARE
Medical management Continue on your antibiotics for 10 more days. Follow up with your PMD within 10-14 days or sooner if your symptoms worsen. Treat and prevent complications of diverticulitis Continue on your antibiotics ciprofloxacin and flagyl 2x/day until 1/29 for total 14 day course. Follow up with your primary care Dr. Crain within 1 week or sooner if your symptoms worsen.  Return to emergency room for worsening symptoms. Treat and prevent complications of gout flare Please take prednisone for 3 days for gout flare up. Follow up with primary care doctor. Treat and prevent complications of CHF Please take medications as prescribed including lasix daily. Follow up blood work BMP in 1 week to monitor kidney function. Treat and prevent complications of chronic kidney disease Follow up nephrologist Dr. Urrutia in 1 week and monitor BMP blood work in 1 week, your potassium was slightly elevated 5.4. Please follow a renal diet, with low potassium, low salt, and low cholesterol. You might need outpatient Epogen shots for anemia from CKD. Please take medications as prescribed including lasix daily. Follow up blood work BMP in 1 week to monitor kidney function.  Follow up with cardiologist Dr. Mckinney in 1-2 week or sooner if needed.

## 2019-01-11 NOTE — DISCHARGE NOTE ADULT - MEDICATION SUMMARY - MEDICATIONS TO TAKE
I will START or STAY ON the medications listed below when I get home from the hospital:    Flagyl 500 mg oral tablet  -- 1 tab(s) by mouth 3 times a day   -- Do not drink alcoholic beverages when taking this medication.  Finish all this medication unless otherwise directed by prescriber.  May discolor urine or feces.    -- Indication: For Diverticulitis    atorvastatin 40 mg oral tablet  -- 1 tab(s) by mouth once a day  -- Indication: For HLD    Lopressor 50 mg oral tablet  -- 1 tab(s) by mouth 2 times a day  -- Indication: For CHF    Lasix 40 mg oral tablet  -- 1 tab(s) by mouth once a day  -- Indication: For CHF    latanoprost 0.005% ophthalmic solution  -- 1 drop(s) to each affected eye once a day (in the evening)  -- Indication: For Dry eyes    sevelamer hydrochloride 800 mg oral tablet  -- 1 tab(s) by mouth 3 times a day (with meals)  -- Indication: For HM    Cipro 500 mg oral tablet  -- 1 tab(s) by mouth 2 times a day   -- Avoid prolonged or excessive exposure to direct and/or artificial sunlight while taking this medication.  Check with your doctor before becoming pregnant.  Do not take dairy products, antacids, or iron preparations within one hour of this medication.  Finish all this medication unless otherwise directed by prescriber.  Medication should be taken with plenty of water.    -- Indication: For Diverticulitis    calcitriol 0.25 mcg oral capsule  -- 1 cap(s) by mouth once a day  -- Indication: For HM    folic acid 0.4 mg oral tablet  -- 1 tab(s) by mouth once a day  -- Indication: For HM I will START or STAY ON the medications listed below when I get home from the hospital:    predniSONE 20 mg oral tablet  -- 1 tab(s) by mouth once a day for 3 days  -- Indication: For Gout    Flagyl 500 mg oral tablet  -- 1 tab(s) by mouth every 12 hours end 1/29  -- Indication: For Diverticulitis    atorvastatin 40 mg oral tablet  -- 1 tab(s) by mouth once a day  -- Indication: For HLD    Lopressor 50 mg oral tablet  -- 1 tab(s) by mouth 2 times a day  -- Indication: For CHF    albuterol 90 mcg/inh inhalation aerosol  -- 2 puff(s) inhaled every 4 hours, As Needed -Shortness of Breath  -- Indication: For shortness of breath    furosemide 40 mg oral tablet  -- 1 tab(s) by mouth once a day  -- Indication: For ChF    docusate sodium 100 mg oral capsule  -- 1 cap(s) by mouth 3 times a day, As Needed -for constipation   -- Indication: For Constipation    senna oral tablet  -- 2 tab(s) by mouth once a day (at bedtime), As Needed -for constipation   -- Indication: For Constipation    latanoprost 0.005% ophthalmic solution  -- 1 drop(s) to each affected eye once a day (in the evening)  -- Indication: For Glaucoma    sevelamer hydrochloride 800 mg oral tablet  -- 1 tab(s) by mouth 3 times a day (with meals)  -- Indication: For lower phosphorus    pantoprazole 40 mg oral delayed release tablet  -- 1 tab(s) by mouth once a day (before a meal)  -- Indication: For GERD    ciprofloxacin 250 mg oral tablet  -- 1 tab(s) by mouth every 12 hours until 1/29  -- Indication: For Diverticulitis    calcitriol 0.25 mcg oral capsule  -- 1 cap(s) by mouth once a day  -- Indication: For Chronic kidney disease    folic acid 0.4 mg oral tablet  -- 1 tab(s) by mouth once a day  -- Indication: For vitamin

## 2019-01-11 NOTE — CONSULT NOTE ADULT - SUBJECTIVE AND OBJECTIVE BOX
HISTORY OF PRESENT ILLNESS:     This 83 yr old female with past medical history of HTN, CKD 4, DVT/PE ( occurred in 2018, thought to be provoked by immobilization during flight from [place], completed 6mo a/c and no longer on Eliquis) CHF s/p PPM, sigmoid diverticulitis treated with antibiotics p/w lower abdominal pain x 1 D admitted for treatment with IV ABx for acute diverticulitis. Cardiology team consulted for mildly positive trop.  Patient seen at bedside, denies active chest pain or sob at rest no palpitations, no diarrhea. EKG showed atrial sensed vpaced rhythm. was able to tolerate liquid fluid this morning. Diarrhea stopped.  On presentation, in ED she complained of wheezes and atypical chest discomfort that resolved with nebulizer. currently asymptomatic       PAST MEDICAL & SURGICAL HISTORY:  Diverticulitis: sigmoid  Gout  Hypertension  Pacemaker  Chronic kidney disease  Artificial pacemaker    FAMILY HISTORY:  No pertinent family history in first degree relatives    Allergies    Keflex (Unknown)    Intolerances    	  Home Medications:  atorvastatin 40 mg oral tablet: 1 tab(s) orally once a day (2019 13:30)  calcitriol 0.25 mcg oral capsule: 1 cap(s) orally once a day (2019 13:30)  folic acid 0.4 mg oral tablet: 1 tab(s) orally once a day (2019 13:30)  Lasix 40 mg oral tablet: 1 tab(s) orally once a day (2019 13:30)  latanoprost 0.005% ophthalmic solution: 1 drop(s) to each affected eye once a day (in the evening) (2019 13:30)  Lopressor 50 mg oral tablet: 1 tab(s) orally 2 times a day (2019 13:30)  sevelamer hydrochloride 800 mg oral tablet: 1 tab(s) orally 3 times a day (with meals) (2019 13:30)    MEDICATIONS  (STANDING):  ALBUTerol/ipratropium for Nebulization 3 milliLiter(s) Nebulizer every 4 hours  atorvastatin 40 milliGRAM(s) Oral at bedtime  calcitriol   Capsule 0.25 MICROGram(s) Oral daily  ciprofloxacin   IVPB 400 milliGRAM(s) IV Intermittent every 12 hours  folic acid 1 milliGRAM(s) Oral daily  heparin  Injectable 5000 Unit(s) SubCutaneous every 8 hours  latanoprost 0.005% Ophthalmic Solution 1 Drop(s) Both EYES at bedtime  metoprolol tartrate 50 milliGRAM(s) Oral two times a day  metroNIDAZOLE  IVPB 500 milliGRAM(s) IV Intermittent every 8 hours  pantoprazole    Tablet 40 milliGRAM(s) Oral before breakfast  sevelamer hydrochloride 800 milliGRAM(s) Oral three times a day with meals    MEDICATIONS  (PRN):  ALBUTerol    90 MICROgram(s) HFA Inhaler 2 Puff(s) Inhalation every 4 hours PRN Shortness of Breath  guaiFENesin    Syrup 100 milliGRAM(s) Oral every 6 hours PRN Cough        SOCIAL HISTORY:    [x ] Non-smoker  [x ]non  Alcohol     REVIEW OF SYSTEMS:  CONSTITUTIONAL: No fever, weight loss, or fatigue  CARDIOLOGY: PAtient denies chest pain, shortness of breath or syncopal episodes.   RESPIRATORY: denies shortness of breath, wheezeing.   NEUROLOGICAL: NO weakness, no focal deficits to report.  ENDOCRINOLOGICAL: no recent change in diabetic medications.   GI: no BRBPR, no N,V,diarrhea.    PSYCHIATRY: normal mood and affect  HEENT: no nasal discharge, no ecchymosis  SKIN: no ecchymosis, no breakdown  MUSCULOSKELETAL: Full range of motion x4.      PHYSICAL EXAM:  T(C): 36.4 (19 @ 04:37), Max: 37.4 (01-10-19 @ 12:26)  HR: 77 (19 @ 04:37) (69 - 79)  BP: 142/73 (19 @ 04:37) (142/73 - 157/79)  RR: 18 (19 @ 04:37) (18 - 18)  SpO2: 98% (01-10-19 @ 19:30) (98% - 98%)  Wt(kg): --  I&O's Summary    10 Navin 2019 07:01  -  2019 07:00  --------------------------------------------------------  IN: 0 mL / OUT: 200 mL / NET: -200 mL      Daily     Daily Weight in k.5 (2019 04:37)    General Appearance: Normal	  Cardiovascular: Normal S1 S2, No JVD, No murmurs, No edema  Respiratory: Lungs clear to auscultation	  Psychiatry: A & O x 3, Mood & affect appropriate  Gastrointestinal:  Soft, Non-tender  Skin: No rashes, No ecchymoses, No cyanosis	  Neurologic: Non-focal  Extremities: Normal range of motion, No clubbing, cyanosis or edema  Vascular: Peripheral pulses palpable 2+ bilaterally        LABS:	 	                        9.3    10.24 )-----------( 309      ( 10 Navin 2019 08:17 )             30.2     01-10    138  |  100  |  33<H>  ----------------------------<  101<H>  5.2<H>   |  23  |  1.6<H>      141  |  103  |  42<H>  ----------------------------<  84  4.9   |  23  |  1.7<H>    Ca    8.8      10 Navin 2019 08:17  Ca    9.1      2019 11:41  Mg     1.8         TPro  6.2  /  Alb  3.3<L>  /  TBili  0.3  /  DBili  x   /  AST  31  /  ALT  19  /  AlkPhos  102  01-10      proBNP:   Lipid Profile:   HgA1c:   TSH:       CARDIAC MARKERS:  Troponin T, Serum: 0.02 ng/mL (01-10 @ 11:13)      	    ECG:  < from: 12 Lead ECG (01.10.19 @ 09:20) >   Normal sinus rhythm  Atrial-sensed ventricular-paced rhythm  Abnormal ECG    < end of copied text >  	  RADIOLOGY:  CXR negative 	    PREVIOUS DIAGNOSTIC TESTING:    [ ] Echocardiogram:  < from: Transthoracic Echocardiogram (18 @ 09:56) >  Summary:   1. Left ventricular ejection fraction, by visual estimation, is 55 to   60%.   2. Normal global left ventricular systolic function.   3. Spectral Doppler shows pseudonormal pattern of left ventricular   myocardial filling (Grade II diastolic dysfunction).   4. Mild-to-moderate concentric left ventricular hypertrophy.   5. Mild mitral regurgitation.   6. Mild tricuspid regurgitation.   7. Estimated pulmonary artery systolic pressure is 42.1 mmHg assuming a   right atrial pressure of 8 mmHg, which is consistent with mild pulmonary   hypertension.    < end of copied text > HISTORY OF PRESENT ILLNESS:     This 83 yr old female with past medical history of HTN, CKD 4, DVT/PE ( occurred in 2018, thought to be provoked by immobilization during flight from [place], completed 6mo a/c and no longer on Eliquis) CHF s/p PPM, sigmoid diverticulitis treated with antibiotics p/w lower abdominal pain x 1 D admitted for treatment with IV ABx for acute diverticulitis. Cardiology team consulted for mildly positive trop.  Patient seen at bedside, denies active chest pain or sob at rest no palpitations, no diarrhea. EKG showed atrial sensed vpaced rhythm. was able to tolerate liquid fluid this morning. Diarrhea stopped.  On presentation, in ED she complained of wheezes and atypical chest discomfort that resolved with nebulizer. currently asymptomatic       PAST MEDICAL & SURGICAL HISTORY:  Diverticulitis: sigmoid  Gout  Hypertension  Pacemaker  Chronic kidney disease  Artificial pacemaker    FAMILY HISTORY:  No pertinent family history in first degree relatives    Allergies    Keflex (Unknown)    Intolerances    	  Home Medications:  atorvastatin 40 mg oral tablet: 1 tab(s) orally once a day (2019 13:30)  calcitriol 0.25 mcg oral capsule: 1 cap(s) orally once a day (2019 13:30)  folic acid 0.4 mg oral tablet: 1 tab(s) orally once a day (2019 13:30)  Lasix 40 mg oral tablet: 1 tab(s) orally once a day (2019 13:30)  latanoprost 0.005% ophthalmic solution: 1 drop(s) to each affected eye once a day (in the evening) (2019 13:30)  Lopressor 50 mg oral tablet: 1 tab(s) orally 2 times a day (2019 13:30)  sevelamer hydrochloride 800 mg oral tablet: 1 tab(s) orally 3 times a day (with meals) (2019 13:30)    MEDICATIONS  (STANDING):  ALBUTerol/ipratropium for Nebulization 3 milliLiter(s) Nebulizer every 4 hours  atorvastatin 40 milliGRAM(s) Oral at bedtime  calcitriol   Capsule 0.25 MICROGram(s) Oral daily  ciprofloxacin   IVPB 400 milliGRAM(s) IV Intermittent every 12 hours  folic acid 1 milliGRAM(s) Oral daily  heparin  Injectable 5000 Unit(s) SubCutaneous every 8 hours  latanoprost 0.005% Ophthalmic Solution 1 Drop(s) Both EYES at bedtime  metoprolol tartrate 50 milliGRAM(s) Oral two times a day  metroNIDAZOLE  IVPB 500 milliGRAM(s) IV Intermittent every 8 hours  pantoprazole    Tablet 40 milliGRAM(s) Oral before breakfast  sevelamer hydrochloride 800 milliGRAM(s) Oral three times a day with meals    MEDICATIONS  (PRN):  ALBUTerol    90 MICROgram(s) HFA Inhaler 2 Puff(s) Inhalation every 4 hours PRN Shortness of Breath  guaiFENesin    Syrup 100 milliGRAM(s) Oral every 6 hours PRN Cough        SOCIAL HISTORY:    [x ] Non-smoker  [x ]non  Alcohol     REVIEW OF SYSTEMS:  CONSTITUTIONAL: No fever, weight loss, or fatigue  CARDIOLOGY: PAtient denies active chest pain, shortness of breath or syncopal episodes.   RESPIRATORY: denies shortness of breath, wheezeing.   NEUROLOGICAL: NO weakness, no focal deficits to report.  GI: no BRBPR, resolved  N,V,diarrhea.    PSYCHIATRY: normal mood and affect  HEENT: no nasal discharge, no ecchymosis  SKIN: no ecchymosis, no breakdown  MUSCULOSKELETAL: bilateral knees pain    PHYSICAL EXAM:  T(C): 36.4 (19 @ 04:37), Max: 37.4 (01-10-19 @ 12:26)  HR: 77 (19 @ 04:37) (69 - 79)  BP: 142/73 (19 @ 04:37) (142/73 - 157/79)  RR: 18 (19 @ 04:37) (18 - 18)  SpO2: 98% (01-10-19 @ 19:30) (98% - 98%)  Wt(kg): --  I&O's Summary    10 Navin 2019 07:01  -  2019 07:00  --------------------------------------------------------  IN: 0 mL / OUT: 200 mL / NET: -200 mL      Daily     Daily Weight in k.5 (2019 04:37)    General Appearance: Normal	  Cardiovascular: Normal S1 S2, No JVD, No murmurs, No edema  Respiratory: Lungs clear	  Psychiatry: A & O x 3,   Gastrointestinal:  Soft, Non-tender  Skin: No rashes, No ecchymoses, No cyanosis	  Neurologic: Non-focal  Extremities: Normal range of motion, No clubbing, cyanosis or edema  Vascular: Peripheral pulses palpable 2+ bilaterally        LABS:	 	                        9.3    10.24 )-----------( 309      ( 10 Navin 2019 08:17 )             30.2     01-10    138  |  100  |  33<H>  ----------------------------<  101<H>  5.2<H>   |  23  |  1.6<H>      141  |  103  |  42<H>  ----------------------------<  84  4.9   |  23  |  1.7<H>    Ca    8.8      10 Navin 2019 08:17  Ca    9.1      2019 11:41  Mg     1.8         TPro  6.2  /  Alb  3.3<L>  /  TBili  0.3  /  DBili  x   /  AST  31  /  ALT  19  /  AlkPhos  102  01-10        CARDIAC MARKERS:  Troponin T, Serum: 0.02 ng/mL (01-10 @ 11:13)      	    ECG:  < from: 12 Lead ECG (01.10.19 @ 09:20) >   Normal sinus rhythm  Atrial-sensed ventricular-paced rhythm  Abnormal ECG    < end of copied text >  	  RADIOLOGY:  CXR negative 	    PREVIOUS DIAGNOSTIC TESTING:    [ ] Echocardiogram:  < from: Transthoracic Echocardiogram (18 @ 09:56) >  Summary:   1. Left ventricular ejection fraction, by visual estimation, is 55 to   60%.   2. Normal global left ventricular systolic function.   3. Spectral Doppler shows pseudonormal pattern of left ventricular   myocardial filling (Grade II diastolic dysfunction).   4. Mild-to-moderate concentric left ventricular hypertrophy.   5. Mild mitral regurgitation.   6. Mild tricuspid regurgitation.   7. Estimated pulmonary artery systolic pressure is 42.1 mmHg assuming a   right atrial pressure of 8 mmHg, which is consistent with mild pulmonary   hypertension.    < end of copied text >

## 2019-01-11 NOTE — PROGRESS NOTE ADULT - SUBJECTIVE AND OBJECTIVE BOX
LENGTH OF HOSPITAL STAY: 2d    CHIEF COMPLAINT:   Patient is a 83y old  Female who presents with a chief complaint of lower abdominal pain (10 Navin 2019 13:40)      HISTORY OF PRESENTING ILLNESS:    HPI:  83 yr old female with pmhx of HTN, CKD 4, recent DVT/PE (thought provoked by travel, completed 6mo a/c and no longer on Eliquis) CHF s/p PPM, sigmoid diverticulitis treated with antibiotics p/w lower abdominal pain x 1 D duration. Acute, mild to moderate, constant, dull, suprapubic and left lower abdominal a/w burning urination but no fever,  diarrhoea or constipation.     She was admitted in hospital in november 2018 for CHF exacerbation and course was complicated by Sigmoid diverticulitis and she was treated with antibiotics. (09 Jan 2019 12:16)    Patient still with lower abdominal pain but improved.  Dyspnea  and chest pain improved.  Had 1 borderline + troponin level    PAST MEDICAL & SURGICAL HISTORY  PAST MEDICAL & SURGICAL HISTORY:  Diverticulitis: sigmoid  Gout  Hypertension  Pacemaker  Chronic kidney disease  Artificial pacemaker    SOCIAL HISTORY:    ALLERGIES:  Keflex (Unknown)    MEDICATIONS:  STANDING MEDICATIONS  ALBUTerol/ipratropium for Nebulization 3 milliLiter(s) Nebulizer every 4 hours  atorvastatin 40 milliGRAM(s) Oral at bedtime  calcitriol   Capsule 0.25 MICROGram(s) Oral daily  ciprofloxacin   IVPB 400 milliGRAM(s) IV Intermittent every 12 hours  folic acid 1 milliGRAM(s) Oral daily  heparin  Injectable 5000 Unit(s) SubCutaneous every 8 hours  latanoprost 0.005% Ophthalmic Solution 1 Drop(s) Both EYES at bedtime  metoprolol tartrate 50 milliGRAM(s) Oral two times a day  metroNIDAZOLE  IVPB 500 milliGRAM(s) IV Intermittent every 8 hours  pantoprazole    Tablet 40 milliGRAM(s) Oral before breakfast  sevelamer hydrochloride 800 milliGRAM(s) Oral three times a day with meals    PRN MEDICATIONS  ALBUTerol    90 MICROgram(s) HFA Inhaler 2 Puff(s) Inhalation every 4 hours PRN  guaiFENesin    Syrup 100 milliGRAM(s) Oral every 6 hours PRN    VITALS:   T(F): 97.6  HR: 77  BP: 142/73  RR: 18  SpO2: 98%    LABS:                        9.3    10.24 )-----------( 309      ( 10 Navin 2019 08:17 )             30.2     01-10    138  |  100  |  33<H>  ----------------------------<  101<H>  5.2<H>   |  23  |  1.6<H>    Ca    8.8      10 Navin 2019 08:17  Mg     1.8     01-09    TPro  6.2  /  Alb  3.3<L>  /  TBili  0.3  /  DBili  x   /  AST  31  /  ALT  19  /  AlkPhos  102  01-10          Troponin T, Serum: 0.02 ng/mL <H> (01-10-19 @ 11:13)      Culture - Urine (collected 09 Jan 2019 04:20)  Source: .Urine Clean Catch (Midstream)  Final Report (10 Navin 2019 10:52):    Culture grew 3 or more types of organisms which indicate    collection contamination; consider recollection only if clinically    indicated.      CARDIAC MARKERS ( 10 Navin 2019 11:13 )  x     / 0.02 ng/mL / x     / x     / x          RADIOLOGY:    PHYSICAL EXAM:  GEN: No acute distress  HEENT:   LUNGS: Clear to auscultation bilaterally   HEART: S1/S2 present. RRR.   ABD: Soft, non-tender, non-distended. Bowel sounds present  EXT:  NEURO: AAOX3

## 2019-01-11 NOTE — PROGRESS NOTE ADULT - ASSESSMENT
83 yr old female with pmhx of HTN, CKD 4, recent DVT/PE (thought provoked by travel, completed 6mo a/c and no longer on Eliquis) CHF s/p PPM, sigmoid diverticulitis treated with antibiotics p/w lower abdominal pain x 1 D duration. CTAP revealed sigmoid diverticulitis.    # Acute sigmoid diverticulitis:  - c/w cipro and flagyl IV. Possibly 24-48 more hours of IV Antibiotics.  - Pt  tolerating liquid diet well.  - GI consult (Private)  placed on 01/09; was called in twice, but pt yet to be seen. Called the office myself at [phone], but no answer.    #Atypical chest discomfort, SOB, wheezing  - resolved on nebulizers  -ACS very unlikely  - PE unlikely but given hx of PE, V/Q scan and b/l LE Doppler today. If negative, can be discharged from cardiac standpoint.     # OMERO on CKD 4 - improving  - Cr baseline is 1.5  - was likely pre-renal; improved on IVF    # Chronic HFpEF/ G2DD:  - stable,   - Lasix was stopped on admission given OMERO. Will resume tmrw if kidney function continues to improve..    # Chronic normocytic anemia:  - baseline hb 9-10.   - not taking iron pills d/t insurance issues.     # Gout:  - stable, not on meds    # from home  DVT ppx: Heparin SQ  NPO for now

## 2019-01-11 NOTE — DISCHARGE NOTE ADULT - PATIENT PORTAL LINK FT
You can access the [email or web address] Patient Portal, offered by [facility], by registering with the following website: [email or web address]

## 2019-01-11 NOTE — DISCHARGE NOTE ADULT - SECONDARY DIAGNOSIS.
Acute gout of foot, unspecified cause, unspecified laterality Acute on chronic diastolic congestive heart failure Stage 4 chronic kidney disease

## 2019-01-11 NOTE — DISCHARGE NOTE ADULT - MEDICATION SUMMARY - MEDICATIONS TO STOP TAKING
I will STOP taking the medications listed below when I get home from the hospital:  None I will STOP taking the medications listed below when I get home from the hospital:    Pepcid

## 2019-01-11 NOTE — PROGRESS NOTE ADULT - ASSESSMENT
83 yr old female with pmhx of HTN, CKD 4, recent DVT/PE (thought provoked by travel, completed 6mo a/c and no longer on Eliquis) CHF s/p PPM, sigmoid diverticulitis treated with antibiotics p/w lower abdominal pain x 1 D duration. CTAP revealed sigmoid diverticulitis.    # Acute sigmoid diverticulitis:  - admit to medicine  - cipro and flagyl IV  - Keep NPO until evaluated by GI    # OMERO on CKD 4:  - Cr baseline is 1.5  - OMERO likely pre-renal, will start on gentle hydration    # Chronic HFpEF/ G2DD:  - stable, stop lasix for now.  - re-evaluate in AM, if OMERO resolved then start on home dose of lasix    # Chronic normocytic anemia:      # COPD/URI   add mini-neb tx albuterol and atrovent , IV Solumedrol and CXR  - baseline hb 9-10.   - not taking iron pills d/t insurance issues.     # Gout:  - no flare  - not on any meds    # from home  DVT ppx with sqh  NPO until seen by GI      Patient will need 1 to 2 days additional IV antibiotics.  No colonoscopy until she fully recovers from her diverticulitis.  Cardiology consult with Dr. Mckinney    Anticipated d/c next 48 to 72 hrs.

## 2019-01-11 NOTE — DISCHARGE NOTE ADULT - HOSPITAL COURSE
83 yr old female with pmhx of HTN, CKD 4, recent DVT/PE (thought provoked by travel, completed 6mo a/c and no longer on Eliquis) CHF s/p PPM, sigmoid diverticulitis treated with antibiotics p/w lower abdominal pain x 1 D duration. Acute, mild to moderate, constant, dull, suprapubic and left lower abdominal a/w burning urination but no fever,  diarrhoea or constipation.     pT WAS ADMITTED FOR ACUTE DIVERTICULITIS, and was started IV abx. She will continue on PO antibiotics for 10 more days. She had an episode fo SOB and wheezing that has resolved on nebulizers. She had a lower extremity DVT which showed R soleal thrombus. V/Q scan showed ----- 83 yr old female with pmhx of HTN, CKD 4, recent DVT/PE (thought provoked by travel, completed 6mo a/c and no longer on Eliquis) CHF s/p PPM, sigmoid diverticulitis treated with antibiotics p/w lower abdominal pain x 1 D duration. Acute, mild to moderate, constant, dull, suprapubic and left lower abdominal a/w burning urination but no fever,  diarrhoea or constipation.     She was admitted in hospital in november 2018 for CHF exacerbation and course was complicated by Sigmoid diverticulitis and she was treated with antibiotics.    84 yo F PMH HTN, CKD 4, recent DVT/PE (thought provoked by travel, completed 6mo a/c and no longer on Eliquis) CHF s/p PPM, sigmoid diverticulitis treated with antibiotics p/w lower abdominal pain x 1 D duration. CTAP revealed sigmoid diverticulitis. Hospitalization complicated by CHF exacerbation due to lasix held for OMERO, which resolved after lasix restarted. OMERO resolved and diverticulitis improved.    # Acute sigmoid diverticulitis- improving  - ID - Po Flagyl 500 mg q12h, Po Cipro 250 mg q12h Duration 14 days (end 1/29)  - d/c-ed vanco and pratik (due to rapid response 1/11 with T 102.5 and leukocytosis)  - was on cipro and flagyl IV before   - Tolerating regular diet    #Asymptomatic bacteruria  - UA 1/12 neg. UCx 1/12 neg. Another UCx 1/12 10-49k VRE likely contaminant.  -Per ID, no need for tx      #Acute on chronic diastolic heart failure- improving  -99% RA  - Vascular congestion on CXR,  BNP 30,198  -Cardio recs abi.  - C/w po lasix 40. Lasix restarted 1/11 (rapid response for SOB due to CHF exacerbation). Home lasix was stopped on admission given slightly worsening kidney function.  - Cr stable 1.7. Monitor  - 2-D echo 11/5: LVEF 55-60%. GIIDD. Mild pulm HTN. LVH    #Atypical chest discomfort, SOB, wheezing- resolved on nebulizers     ACS very unlikely  Cardio recs abi- trop stable 0.02, mildly positive troponin  in setting of dehydration, infection, CKD    hx of PE, V/Q scan 1/11- low prob PE      # OMREO on CKD 4- improved  Nephro recs abi  - Cr baseline is 1.5. Today 1.5   - was likely pre-renal; improved on IVF, but pt volume overloaded and restarted lasix.  -Pending urine sodium, urea, cr      # Chronic normocytic anemia- stable  - baseline hb 9-10.  8.6 today  - not taking iron pills d/t insurance issues.   - Nephro- considering outpatient Epogen shots (iron normal, TIBC low, waiting ferritin)    # Gout flare- not improving  -  Another solumedrol 60 mg ivpb- 2nd dose. Already got pred 20 in AM  -oral prednisone 20 qd x 3 days tomorrow       #DVT  Eliquis d/cameron although patient has R soleal DVT due risk outweigh benefits of treatment.    #Hypomagnesemia resolved     DVT ppx: Heparin 5000 q8h 83 yr old female with pmhx of HTN, CKD 4, recent DVT/PE (thought provoked by travel, completed 6mo a/c and no longer on Eliquis) CHF s/p PPM, sigmoid diverticulitis treated with antibiotics p/w lower abdominal pain x 1 D duration. Acute, mild to moderate, constant, dull, suprapubic and left lower abdominal a/w burning urination but no fever,  diarrhea or constipation.   She was admitted in hospital in november 2018 for CHF exacerbation and course was complicated by Sigmoid diverticulitis and she was treated with antibiotics.    CTAP revealed sigmoid diverticulitis. Hospitalization complicated by CHF exacerbation due to lasix held for OMERO, which resolved after lasix restarted. OMERO resolved and diverticulitis improved.    # Acute sigmoid diverticulitis- improving  - ID - Po Flagyl 500 mg q12h, Po Cipro 250 mg q12h Duration 14 days (end 1/29)  - d/c-ed vanco and pratik (due to rapid response 1/11 with T 102.5 and leukocytosis)  - was on cipro and flagyl IV before   - Tolerating regular diet    #Asymptomatic bacteruria  - UA 1/12 neg. UCx 1/12 neg. Another UCx 1/12 10-49k VRE likely contaminant.  -Per ID, no need for tx      #Acute on chronic diastolic heart failure- improving  -99% RA  - Vascular congestion on CXR,  BNP 30,198  -Cardio recs abi.  - C/w po lasix 40. Lasix restarted 1/11 (rapid response for SOB due to CHF exacerbation). Home lasix was stopped on admission given slightly worsening kidney function.  - Cr stable 1.5  - 2-D echo 11/5: LVEF 55-60%. GIIDD. Mild pulm HTN. LVH    #Atypical chest discomfort, SOB, wheezing- resolved on nebulizers     ACS very unlikely  Cardio recs abi- trop stable 0.02, mildly positive troponin  in setting of dehydration, infection, CKD    hx of PE, V/Q scan 1/11- low prob PE      # OMERO on CKD 4- improved  Nephro recs abi  - Cr baseline is 1.5. Today 1.5   - was likely pre-renal; improved on IVF, but pt volume overloaded and restarted lasix.      # Chronic normocytic anemia- stable  - baseline hb 9-10.  8.6 today  - not taking iron pills d/t insurance issues.   - Nephro- considering outpatient Epogen shots (iron normal, TIBC low, waiting ferritin)    # Gout flare- not improving  -  s/p solumedrol 60 mg ivpb once 1/15  -oral prednisone 20 qd x 3 days      #Hyperkalemia   K 5.4   Repeat BMP in 1 week oupt and low K diet     #DVT  Eliquis d/cameron although patient has R soleal DVT due risk outweigh benefits of treatment.    #Hypomagnesemia resolved   PT saw pt    Pt stable, ambulates. D/c home with home care.

## 2019-01-11 NOTE — CONSULT NOTE ADULT - ASSESSMENT
-acute diverticulitis , on ABX resolving, able to tolerate liquid this morning  -mildly positive troponin  in setting of dehydration, infection, CKD  -atypical chest discomfort, wheezes on presentation resolved, no evidence of ACS or CHF decompensation clinicallly      Plan:  continue statin  continue metoprolol  ABx as by primary team  no need for inpatient further cardiac workup -acute diverticulitis , on ABX resolving, able to tolerate liquid this morning  -mildly positive troponin  in setting of dehydration, infection, CKD  -atypical chest discomfort, wheezes on presentation resolved, no evidence of ACS or CHF decompensation clinicallly  Hx of provoked PE in the past       Plan:  continue statin  continue metoprolol  ABx as by primary team  US doppler LE   VQ scan lungs -acute diverticulitis , on ABX resolving, able to tolerate liquid this morning  -mildly positive troponin  in setting of dehydration, infection, CKD  -atypical chest discomfort, wheezes on presentation resolved, no evidence of ACS or CHF decompensation clinicallly  Hx of provoked PE in the past       Plan:  continue statin  continue metoprolol  ABx as by primary team  US doppler LE   VQ scan lungs in view of SOB and hx of PE within the past year .

## 2019-01-11 NOTE — PROGRESS NOTE ADULT - SUBJECTIVE AND OBJECTIVE BOX
RACHEL SUMMERS 83y Female  MRN#: [id number]         SUBJECTIVE  Patient is a 83y old Female who presents with a chief complaint of lower abdominal pain (11 Jan 2019 09:00)  Currently admitted to medicine with the primary diagnosis of Diverticulitis    Today is hospital day 2d, and this morning pt is resting comfortably, and reports no recurrence of her SOB. Her pain and discomfort both resolved on nebulizers.        OBJECTIVE  PAST MEDICAL & SURGICAL HISTORY  Diverticulitis: sigmoid  Gout  Hypertension  Pacemaker  Chronic kidney disease  Artificial pacemaker    ALLERGIES:  Keflex (Unknown)    MEDICATIONS:  STANDING MEDICATIONS  ALBUTerol/ipratropium for Nebulization 3 milliLiter(s) Nebulizer every 4 hours  atorvastatin 40 milliGRAM(s) Oral at bedtime  calcitriol   Capsule 0.25 MICROGram(s) Oral daily  ciprofloxacin   IVPB 400 milliGRAM(s) IV Intermittent every 12 hours  folic acid 1 milliGRAM(s) Oral daily  heparin  Injectable 5000 Unit(s) SubCutaneous every 8 hours  latanoprost 0.005% Ophthalmic Solution 1 Drop(s) Both EYES at bedtime  metoprolol tartrate 50 milliGRAM(s) Oral two times a day  metroNIDAZOLE  IVPB 500 milliGRAM(s) IV Intermittent every 8 hours  pantoprazole    Tablet 40 milliGRAM(s) Oral before breakfast  sevelamer hydrochloride 800 milliGRAM(s) Oral three times a day with meals    PRN MEDICATIONS  ALBUTerol    90 MICROgram(s) HFA Inhaler 2 Puff(s) Inhalation every 4 hours PRN  guaiFENesin    Syrup 100 milliGRAM(s) Oral every 6 hours PRN      Home Medications:  atorvastatin 40 mg oral tablet: 1 tab(s) orally once a day (09 Jan 2019 13:30)  calcitriol 0.25 mcg oral capsule: 1 cap(s) orally once a day (09 Jan 2019 13:30)  folic acid 0.4 mg oral tablet: 1 tab(s) orally once a day (09 Jan 2019 13:30)  Lasix 40 mg oral tablet: 1 tab(s) orally once a day (09 Jan 2019 13:30)  latanoprost 0.005% ophthalmic solution: 1 drop(s) to each affected eye once a day (in the evening) (09 Jan 2019 13:30)  Lopressor 50 mg oral tablet: 1 tab(s) orally 2 times a day (09 Jan 2019 13:30)  sevelamer hydrochloride 800 mg oral tablet: 1 tab(s) orally 3 times a day (with meals) (09 Jan 2019 13:30)      VITAL SIGNS: Last 24 Hours  T(C): 36.4 (11 Jan 2019 04:37), Max: 37.4 (10 Navin 2019 12:26)  T(F): 97.6 (11 Jan 2019 04:37), Max: 99.4 (10 Navin 2019 12:26)  HR: 77 (11 Jan 2019 04:37) (69 - 79)  BP: 142/73 (11 Jan 2019 04:37) (142/73 - 157/79)  BP(mean): --  RR: 18 (11 Jan 2019 04:37) (18 - 18)  SpO2: 98% (10 Navin 2019 19:30) (98% - 98%)    LABS:                        8.1    11.76 )-----------( 260      ( 11 Jan 2019 10:16 )             25.4     01-10    138  |  100  |  33<H>  ----------------------------<  101<H>  5.2<H>   |  23  |  1.6<H>    Ca    8.8      10 Navin 2019 08:17  Mg     1.8     01-09    TPro  6.2  /  Alb  3.3<L>  /  TBili  0.3  /  DBili  x   /  AST  31  /  ALT  19  /  AlkPhos  102  01-10          Troponin T, Serum: 0.02 ng/mL <H> (01-10-19 @ 11:13)      Culture - Urine (collected 09 Jan 2019 04:20)  Source: .Urine Clean Catch (Midstream)  Final Report (10 Navin 2019 10:52):    Culture grew 3 or more types of organisms which indicate    collection contamination; consider recollection only if clinically    indicated.      CARDIAC MARKERS ( 10 Navin 2019 11:13 )  x     / 0.02 ng/mL / x     / x     / x          I&O's Summary    10 Navin 2019 07:01  -  11 Jan 2019 07:00  --------------------------------------------------------  IN: 0 mL / OUT: 200 mL / NET: -200 mL          PHYSICAL EXAM:    General: Not in distress.   HEENT: Moist mucus membranes. PERRLA.  Cardio: Regular rate and rhythm, S1, S2, no murmur, rub, or gallop.  Pulm: Clear to auscultation bilaterally. No wheezing, or rhonchi  Abdomen: Soft, non-tender, non-distended. Normoactive bowel sounds.  Extremities: No cyanosis or edema bilaterally. No calf tenderness to palpation.  Neuro: A&O x3. No focal deficits.       RADIOLOGY:        ASSESSMENT & PLAN

## 2019-01-11 NOTE — DISCHARGE NOTE ADULT - CARE PROVIDER_API CALL
Wade Celeste), Internal Medicine  [address] [place] [address]  Phone: [phone]  Fax: [phone] Wade Celeste), Internal Medicine  [address] [place] [address]  Phone: [phone]  Fax: [phone]    Sterling Mckinney), Cardiovascular Disease; Internal Medicine  [address] [address]  Phone: [phone]  Fax: [phone]    Sukhjinder Urrutia), Nephrology  [address] [place] [address]  Phone: [phone]  Fax: [phone]

## 2019-01-11 NOTE — DISCHARGE NOTE ADULT - CARE PROVIDERS DIRECT ADDRESSES
,DirectAddress_Unknown ,DirectAddress_Unknown,[email or web address],DirectAddress_Unknown

## 2019-01-12 LAB
ALBUMIN SERPL ELPH-MCNC: 3.2 G/DL — LOW (ref 3.5–5.2)
ALP SERPL-CCNC: 84 U/L — SIGNIFICANT CHANGE UP (ref 30–115)
ALT FLD-CCNC: 14 U/L — SIGNIFICANT CHANGE UP (ref 0–41)
ANION GAP SERPL CALC-SCNC: 17 MMOL/L — HIGH (ref 7–14)
APPEARANCE UR: CLEAR — SIGNIFICANT CHANGE UP
AST SERPL-CCNC: 22 U/L — SIGNIFICANT CHANGE UP (ref 0–41)
BACTERIA # UR AUTO: ABNORMAL /HPF
BILIRUB SERPL-MCNC: 0.3 MG/DL — SIGNIFICANT CHANGE UP (ref 0.2–1.2)
BILIRUB UR-MCNC: NEGATIVE — SIGNIFICANT CHANGE UP
BUN SERPL-MCNC: 31 MG/DL — HIGH (ref 10–20)
CALCIUM SERPL-MCNC: 8.4 MG/DL — LOW (ref 8.5–10.1)
CHLORIDE SERPL-SCNC: 99 MMOL/L — SIGNIFICANT CHANGE UP (ref 98–110)
CK MB CFR SERPL CALC: 2.7 NG/ML — SIGNIFICANT CHANGE UP (ref 0.6–6.3)
CK SERPL-CCNC: 79 U/L — SIGNIFICANT CHANGE UP (ref 0–225)
CO2 SERPL-SCNC: 21 MMOL/L — SIGNIFICANT CHANGE UP (ref 17–32)
COLOR SPEC: YELLOW — SIGNIFICANT CHANGE UP
COMMENT - URINE: SIGNIFICANT CHANGE UP
CREAT SERPL-MCNC: 1.9 MG/DL — HIGH (ref 0.7–1.5)
DIFF PNL FLD: NEGATIVE — SIGNIFICANT CHANGE UP
EPI CELLS # UR: ABNORMAL /HPF
GLUCOSE SERPL-MCNC: 151 MG/DL — HIGH (ref 70–99)
GLUCOSE UR QL: NEGATIVE MG/DL — SIGNIFICANT CHANGE UP
HCT VFR BLD CALC: 27.7 % — LOW (ref 37–47)
HGB BLD-MCNC: 8.8 G/DL — LOW (ref 12–16)
KETONES UR-MCNC: NEGATIVE — SIGNIFICANT CHANGE UP
LEUKOCYTE ESTERASE UR-ACNC: NEGATIVE — SIGNIFICANT CHANGE UP
MCHC RBC-ENTMCNC: 28.6 PG — SIGNIFICANT CHANGE UP (ref 27–31)
MCHC RBC-ENTMCNC: 31.8 G/DL — LOW (ref 32–37)
MCV RBC AUTO: 89.9 FL — SIGNIFICANT CHANGE UP (ref 81–99)
NITRITE UR-MCNC: NEGATIVE — SIGNIFICANT CHANGE UP
NRBC # BLD: 0 /100 WBCS — SIGNIFICANT CHANGE UP (ref 0–0)
PH UR: 6 — SIGNIFICANT CHANGE UP (ref 5–8)
PLATELET # BLD AUTO: 283 K/UL — SIGNIFICANT CHANGE UP (ref 130–400)
POTASSIUM SERPL-MCNC: 4.4 MMOL/L — SIGNIFICANT CHANGE UP (ref 3.5–5)
POTASSIUM SERPL-SCNC: 4.4 MMOL/L — SIGNIFICANT CHANGE UP (ref 3.5–5)
PROT SERPL-MCNC: 5.9 G/DL — LOW (ref 6–8)
PROT UR-MCNC: 30 MG/DL
RBC # BLD: 3.08 M/UL — LOW (ref 4.2–5.4)
RBC # FLD: 15.4 % — HIGH (ref 11.5–14.5)
SODIUM SERPL-SCNC: 137 MMOL/L — SIGNIFICANT CHANGE UP (ref 135–146)
SP GR SPEC: 1.01 — SIGNIFICANT CHANGE UP (ref 1.01–1.03)
TROPONIN T SERPL-MCNC: 0.02 NG/ML — HIGH
UROBILINOGEN FLD QL: 0.2 MG/DL — SIGNIFICANT CHANGE UP (ref 0.2–0.2)
WBC # BLD: 14.32 K/UL — HIGH (ref 4.8–10.8)
WBC # FLD AUTO: 14.32 K/UL — HIGH (ref 4.8–10.8)

## 2019-01-12 RX ORDER — FUROSEMIDE 40 MG
40 TABLET ORAL DAILY
Qty: 0 | Refills: 0 | Status: DISCONTINUED | OUTPATIENT
Start: 2019-01-12 | End: 2019-01-14

## 2019-01-12 RX ADMIN — MEROPENEM 100 MILLIGRAM(S): 1 INJECTION INTRAVENOUS at 17:42

## 2019-01-12 RX ADMIN — Medication 1 MILLIGRAM(S): at 11:53

## 2019-01-12 RX ADMIN — Medication 250 MILLIGRAM(S): at 11:54

## 2019-01-12 RX ADMIN — Medication 50 MILLIGRAM(S): at 17:45

## 2019-01-12 RX ADMIN — Medication 3 MILLILITER(S): at 19:00

## 2019-01-12 RX ADMIN — MEROPENEM 100 MILLIGRAM(S): 1 INJECTION INTRAVENOUS at 05:25

## 2019-01-12 RX ADMIN — Medication 3 MILLILITER(S): at 07:31

## 2019-01-12 RX ADMIN — HEPARIN SODIUM 5000 UNIT(S): 5000 INJECTION INTRAVENOUS; SUBCUTANEOUS at 05:26

## 2019-01-12 RX ADMIN — SEVELAMER CARBONATE 800 MILLIGRAM(S): 2400 POWDER, FOR SUSPENSION ORAL at 11:52

## 2019-01-12 RX ADMIN — Medication 50 MILLIGRAM(S): at 05:25

## 2019-01-12 RX ADMIN — PANTOPRAZOLE SODIUM 40 MILLIGRAM(S): 20 TABLET, DELAYED RELEASE ORAL at 08:06

## 2019-01-12 RX ADMIN — HEPARIN SODIUM 5000 UNIT(S): 5000 INJECTION INTRAVENOUS; SUBCUTANEOUS at 13:30

## 2019-01-12 RX ADMIN — SEVELAMER CARBONATE 800 MILLIGRAM(S): 2400 POWDER, FOR SUSPENSION ORAL at 08:07

## 2019-01-12 RX ADMIN — CALCITRIOL 0.25 MICROGRAM(S): 0.5 CAPSULE ORAL at 11:52

## 2019-01-12 RX ADMIN — SEVELAMER CARBONATE 800 MILLIGRAM(S): 2400 POWDER, FOR SUSPENSION ORAL at 17:45

## 2019-01-12 RX ADMIN — HEPARIN SODIUM 5000 UNIT(S): 5000 INJECTION INTRAVENOUS; SUBCUTANEOUS at 21:58

## 2019-01-12 RX ADMIN — LATANOPROST 1 DROP(S): 0.05 SOLUTION/ DROPS OPHTHALMIC; TOPICAL at 21:58

## 2019-01-12 RX ADMIN — Medication 40 MILLIGRAM(S): at 05:26

## 2019-01-12 RX ADMIN — Medication 3 MILLILITER(S): at 12:23

## 2019-01-12 RX ADMIN — ATORVASTATIN CALCIUM 40 MILLIGRAM(S): 80 TABLET, FILM COATED ORAL at 21:59

## 2019-01-12 NOTE — PROGRESS NOTE ADULT - ATTENDING COMMENTS
83 yr old female with pmhx of HTN, CKD 4, recent DVT/PE (thought provoked by travel, completed 6mo a/c and no longer on Eliquis) CHF s/p PPM, sigmoid diverticulitis treated with antibiotics p/w lower abdominal pain x 1 D duration. CTAP revealed sigmoid diverticulitis.    1. SOB post rapid response     improved with IV LAsix; started on Vanco & Merem f/u CXR Ucx    f/u ECG & labs    2. Acute sigmoid diverticulitis:     cipro and flagyl IV held     tolerating liquid diet well.     GI eval     3. Atypical chest discomfort, SOB, wheezing     resolved on nebulizers     ACS very unlikely    hx of PE,      f/u V/Q scan and b/l LE Doppler      4. OMERO on CKD 4      improving      baseline is 1.5      pre-renal; improved; now off IVF    5. Chronic HFpEF/ G2DD:     stable,      Lasix was stopped on admission given OMERO, now resumed    6.  Chronic normocytic anemia     baseline  9-10.      not taking iron pills d/t insurance issues.     7. Gout:     stable, not on meds    rom home  DVT ppx: Heparin SQ

## 2019-01-12 NOTE — PROGRESS NOTE ADULT - SUBJECTIVE AND OBJECTIVE BOX
RACHEL SUMMERS  83y  Female    Patient is a 83y old  Female who presents with a chief complaint of lower abdominal pain (2019 15:24)      INTERVAL HPI/OVERNIGHT EVENTS: Post rapid response    T(C): 36.1 (19 @ 00:00), Max: 38.5 (19 @ 22:10)  HR: 94 (19 @ 00:00) (93 - 120)  BP: 177/91 (19 @ 00:00) (135/70 - 177/91)  RR: 18 (19 @ 00:00) (18 - 18)  SpO2: 98% (19 @ 00:00) (94% - 98%)  Wt(kg): --Vital Signs Last 24 Hrs  T(C): 36.1 (2019 00:00), Max: 38.5 (2019 22:10)  T(F): 97 (2019 00:00), Max: 101.3 (2019 22:10)  HR: 94 (2019 00:00) (93 - 120)  BP: 177/91 (2019 00:00) (135/70 - 177/91)  BP(mean): --  RR: 18 (2019 00:00) (18 - 18)  SpO2: 98% (2019 00:00) (94% - 98%)    PHYSICAL EXAM:  GENERAL: NAD, well-groomed, well-developed  HEAD:  Atraumatic, Normocephalic  NECK: Supple, No JVD, Normal thyroid  NERVOUS SYSTEM:  Alert & Oriented X3, Good concentration; Motor Strength 5/5 B/L upper and lower extremities; DTRs 2+ intact and symmetric  CHEST/LUNG: Clear to percussion bilaterally; No rales, rhonchi, wheezing, or rubs  HEART: Regular rate and rhythm; No murmurs, rubs, or gallops  ABDOMEN: Soft, Nontender, Nondistended; Bowel sounds present  EXTREMITIES:  2+ Peripheral Pulses, No clubbing, cyanosis, or edema    Consultant(s) Notes Reviewed:  [x ] YES  [ ] NO    Discussed with Consultants/Other Providers/Residents [ x] YES     LABS                         9.2    15.65 )-----------( 321      ( 2019 21:00 )             28.8         137  |  98  |  32<H>  ----------------------------<  111<H>  4.4   |  20  |  1.8<H>    Ca    8.3<L>      2019 10:16    TPro  5.8<L>  /  Alb  3.0<L>  /  TBili  0.2  /  DBili  x   /  AST  23  /  ALT  15  /  AlkPhos  79            LIVER FUNCTIONS - ( 2019 10:16 )  Alb: 3.0 g/dL / Pro: 5.8 g/dL / ALK PHOS: 79 U/L / ALT: 15 U/L / AST: 23 U/L / GGT: x           CAPILLARY BLOOD GLUCOSE      POCT Blood Glucose.: 151 mg/dL (2019 20:23)    Urinalysis Basic - ( 2019 02:00 )    Color: Yellow / Appearance: Clear / S.010 / pH: x  Gluc: x / Ketone: Negative  / Bili: Negative / Urobili: 0.2 mg/dL   Blood: x / Protein: 30 mg/dL / Nitrite: Negative   Leuk Esterase: Negative / RBC: x / WBC x   Sq Epi: x / Non Sq Epi: Few /HPF / Bacteria: Few /HPF        RADIOLOGY & ADDITIONAL TESTS:    Imaging Personally Reviewed:  [x ] YES  [ ] NO    MEDICATIONS  (STANDING):  ALBUTerol/ipratropium for Nebulization 3 milliLiter(s) Nebulizer every 4 hours  atorvastatin 40 milliGRAM(s) Oral at bedtime  calcitriol   Capsule 0.25 MICROGram(s) Oral daily  folic acid 1 milliGRAM(s) Oral daily  furosemide   Injectable 40 milliGRAM(s) IV Push daily  heparin  Injectable 5000 Unit(s) SubCutaneous every 8 hours  latanoprost 0.005% Ophthalmic Solution 1 Drop(s) Both EYES at bedtime  meropenem  IVPB 1000 milliGRAM(s) IV Intermittent every 12 hours  meropenem  IVPB      metoprolol tartrate 50 milliGRAM(s) Oral two times a day  pantoprazole    Tablet 40 milliGRAM(s) Oral before breakfast  sevelamer hydrochloride 800 milliGRAM(s) Oral three times a day with meals  vancomycin  IVPB 750 milliGRAM(s) IV Intermittent daily    MEDICATIONS  (PRN):  ALBUTerol    90 MICROgram(s) HFA Inhaler 2 Puff(s) Inhalation every 4 hours PRN Shortness of Breath  guaiFENesin    Syrup 100 milliGRAM(s) Oral every 6 hours PRN Cough  LORazepam     Tablet 0.5 milliGRAM(s) Oral daily PRN Anxiety      HEALTH ISSUES - PROBLEM Dx:

## 2019-01-12 NOTE — PROGRESS NOTE ADULT - SUBJECTIVE AND OBJECTIVE BOX
RACHEL SUMMERS 83y Female  MRN#: [id number]         SUBJECTIVE  Patient is a 83y old Female who presents with a chief complaint of lower abdominal pain (2019 04:44)  Currently admitted to medicine with the primary diagnosis of Diverticulitis     Today is hospital day 3d, and this morning pt is resting comfortably. Rapid response was called overnight for SOB, wheezing, vascular congestion, with spike in temperature and hypertension. Pt's abx regimen was changed and stat labs were drawn showed leukocytosis and elevated BNP.        OBJECTIVE  PAST MEDICAL & SURGICAL HISTORY  Diverticulitis: sigmoid  Gout  Hypertension  Pacemaker  Chronic kidney disease  Artificial pacemaker    ALLERGIES:  Keflex (Unknown)    MEDICATIONS:  STANDING MEDICATIONS  ALBUTerol/ipratropium for Nebulization 3 milliLiter(s) Nebulizer every 4 hours  atorvastatin 40 milliGRAM(s) Oral at bedtime  calcitriol   Capsule 0.25 MICROGram(s) Oral daily  folic acid 1 milliGRAM(s) Oral daily  furosemide   Injectable 40 milliGRAM(s) IV Push daily  heparin  Injectable 5000 Unit(s) SubCutaneous every 8 hours  latanoprost 0.005% Ophthalmic Solution 1 Drop(s) Both EYES at bedtime  meropenem  IVPB 1000 milliGRAM(s) IV Intermittent every 12 hours  meropenem  IVPB      metoprolol tartrate 50 milliGRAM(s) Oral two times a day  pantoprazole    Tablet 40 milliGRAM(s) Oral before breakfast  sevelamer hydrochloride 800 milliGRAM(s) Oral three times a day with meals  vancomycin  IVPB 750 milliGRAM(s) IV Intermittent daily    PRN MEDICATIONS  ALBUTerol    90 MICROgram(s) HFA Inhaler 2 Puff(s) Inhalation every 4 hours PRN  guaiFENesin    Syrup 100 milliGRAM(s) Oral every 6 hours PRN  LORazepam     Tablet 0.5 milliGRAM(s) Oral daily PRN      Home Medications:  atorvastatin 40 mg oral tablet: 1 tab(s) orally once a day (2019 13:30)  calcitriol 0.25 mcg oral capsule: 1 cap(s) orally once a day (2019 13:30)  folic acid 0.4 mg oral tablet: 1 tab(s) orally once a day (2019 13:30)  Lasix 40 mg oral tablet: 1 tab(s) orally once a day (2019 13:30)  latanoprost 0.005% ophthalmic solution: 1 drop(s) to each affected eye once a day (in the evening) (2019 13:30)  Lopressor 50 mg oral tablet: 1 tab(s) orally 2 times a day (2019 13:30)  sevelamer hydrochloride 800 mg oral tablet: 1 tab(s) orally 3 times a day (with meals) (2019 13:30)      VITAL SIGNS: Last 24 Hours  T(C): 36.7 (2019 04:00), Max: 39.7 (2019 20:33)  T(F): 98.1 (2019 04:00), Max: 103.4 (2019 20:33)  HR: 86 (2019 04:00) (86 - 120)  BP: 138/83 (2019 04:00) (135/70 - 181/112)  BP(mean): --  RR: 18 (2019 04:00) (18 - 20)  SpO2: 98% (2019 00:00) (94% - 98%)    LABS:                        8.8    14.32 )-----------( 283      ( 2019 09:45 )             27.7     12    137  |  99  |  31<H>  ----------------------------<  151<H>  4.4   |  21  |  1.9<H>    Ca    8.4<L>      2019 09:45    TPro  5.9<L>  /  Alb  3.2<L>  /  TBili  0.3  /  DBili  x   /  AST  22  /  ALT  14  /  AlkPhos  84  12      Urinalysis Basic - ( 2019 02:00 )    Color: Yellow / Appearance: Clear / S.010 / pH: x  Gluc: x / Ketone: Negative  / Bili: Negative / Urobili: 0.2 mg/dL   Blood: x / Protein: 30 mg/dL / Nitrite: Negative   Leuk Esterase: Negative / RBC: x / WBC x   Sq Epi: x / Non Sq Epi: Few /HPF / Bacteria: Few /HPF        Creatine Kinase, Serum: 79 U/L (19 @ 09:45)  Troponin T, Serum: 0.02 ng/mL <H> (19 @ 09:45)  Troponin T, Serum: 0.03 ng/mL <HH> (19 @ 21:05)  Creatine Kinase, Serum: 98 U/L (19 @ 21:00)  Troponin T, Serum: 0.03 ng/mL <HH> (19 @ 21:00)  Lactate, Blood: 2.2 mmol/L (19 @ 21:00)      CARDIAC MARKERS ( 2019 09:45 )  x     / 0.02 ng/mL / 79 U/L / x     / 2.7 ng/mL  CARDIAC MARKERS ( 2019 21:05 )  x     / 0.03 ng/mL / x     / x     / x      CARDIAC MARKERS ( 2019 21:00 )  x     / 0.03 ng/mL / 98 U/L / x     / 2.6 ng/mL      I&O's Summary    2019 07:01  -  2019 07:00  --------------------------------------------------------  IN: 0 mL / OUT: 725 mL / NET: -725 mL          PHYSICAL EXAM:    General: Not in distress.   HEENT: Moist mucus membranes. PERRLA.  Cardio: Regular rate and rhythm, S1, S2,   Pulm: b/l crackles  Abdomen: Soft, non-tender, non-distended.   Extremities: No cyanosis or edema bilaterally.   Neuro: A&O x3. No focal deficits.         RADIOLOGY:        < from: [facility] Pulmonary Ventilation/Perfusion Scan (19 @ 16:03) >  Impression:    Very low probability of pulmonary embolism.    < end of copied text >      < from: [facility] Duplex Lower Ext Vein Scan, Bilat (19 @ 14:15) >    Superficial thrombosis noted in the right soleal vein.  No evidence of deep venous thrombosis or superficial thrombophlebitis in   left lower extremity.    < end of copied text >      < from: Xray Chest 1 View- PORTABLE-Urgent (01.10.19 @ 10:42) >  Impression:      Cardiomegaly with small effusions.    Unchanged.    < end of copied text >

## 2019-01-12 NOTE — PROGRESS NOTE ADULT - ASSESSMENT
83 yr old female with pmhx of HTN, CKD 4, recent DVT/PE (thought provoked by travel, completed 6mo a/c and no longer on Eliquis) CHF s/p PPM, sigmoid diverticulitis treated with antibiotics p/w lower abdominal pain x 1 D duration. CTAP revealed sigmoid diverticulitis.    # Acute sigmoid diverticulitis:  - c/w cipro and flagyl IV. Possibly 24-48 more hours of IV Antibiotics.  - Pt  tolerating liquid diet well.  - GI consult (Private)  placed on 01/09; was called in twice, but pt yet to be seen. Called the office myself at [phone], but no answer.    #Acute on chronic diastolic heart failure   - Vascular congestion on CXR, crackles on physical exam, BNP 30,198  -Pt clinically improved today  - IV lasix 40mg QD started yesterday. home lasix was stopped on admission given slightly worsening kidney function.  -MONITOR KIDNEY FUNCTION, and if Cr continues to worsen tmrw, switch to PO lasix/metolazone and consider consulting nephrology.  - 2-D echo: LVEF 55-60%. GIIDD. Mild pulm    # OMERO on CKD 4  - Cr baseline is 1.5  - was likely pre-renal; improved on IVF, but pt volume overloaded.      # Chronic normocytic anemia:  - baseline hb 9-10.   - not taking iron pills d/t insurance issues.     # Gout:  - stable, not on meds    # from home  DVT ppx: Heparin SQ  NPO for now 83 yr old female with pmhx of HTN, CKD 4, recent DVT/PE (thought provoked by travel, completed 6mo a/c and no longer on Eliquis) CHF s/p PPM, sigmoid diverticulitis treated with antibiotics p/w lower abdominal pain x 1 D duration. CTAP revealed sigmoid diverticulitis.    # Acute sigmoid diverticulitis:  - c/w cipro and flagyl IV. Possibly 24-48 more hours of IV Antibiotics.  - Pt  tolerating liquid diet well.  - GI consult (Private)  placed on 01/09; was called in twice, but pt yet to be seen. Called the office myself at [phone], but no answer.    #Acute on chronic diastolic heart failure   - Vascular congestion on CXR, crackles on physical exam, BNP 30,198  -Pt clinically improved today  - IV lasix 40mg QD started yesterday. home lasix was stopped on admission given slightly worsening kidney function.  -MONITOR KIDNEY FUNCTION, and if Cr continues to worsen tmrw, switch to PO lasix/metolazone and consider consulting nephrology.  - 2-D echo: LVEF 55-60%. GIIDD. Mild pulm    #Fever, leukocytosis  - No infiltrates on CXR, f/u official read  - F/u uCx, Blood Cx  -Pt started on vanco + Lillian overnight    # OMERO on CKD 4  - Cr baseline is 1.5  - was likely pre-renal; improved on IVF, but pt volume overloaded.      # Chronic normocytic anemia:  - baseline hb 9-10.   - not taking iron pills d/t insurance issues.     # Gout:  - stable, not on meds    # from home  DVT ppx: Heparin SQ  NPO for now

## 2019-01-13 LAB
ALBUMIN SERPL ELPH-MCNC: 2.9 G/DL — LOW (ref 3.5–5.2)
ALP SERPL-CCNC: 73 U/L — SIGNIFICANT CHANGE UP (ref 30–115)
ALT FLD-CCNC: 13 U/L — SIGNIFICANT CHANGE UP (ref 0–41)
ANION GAP SERPL CALC-SCNC: 15 MMOL/L — HIGH (ref 7–14)
AST SERPL-CCNC: 20 U/L — SIGNIFICANT CHANGE UP (ref 0–41)
BILIRUB SERPL-MCNC: 0.3 MG/DL — SIGNIFICANT CHANGE UP (ref 0.2–1.2)
BUN SERPL-MCNC: 30 MG/DL — HIGH (ref 10–20)
CALCIUM SERPL-MCNC: 8.5 MG/DL — SIGNIFICANT CHANGE UP (ref 8.5–10.1)
CHLORIDE SERPL-SCNC: 98 MMOL/L — SIGNIFICANT CHANGE UP (ref 98–110)
CO2 SERPL-SCNC: 23 MMOL/L — SIGNIFICANT CHANGE UP (ref 17–32)
CREAT SERPL-MCNC: 1.9 MG/DL — HIGH (ref 0.7–1.5)
CULTURE RESULTS: NO GROWTH — SIGNIFICANT CHANGE UP
GLUCOSE SERPL-MCNC: 82 MG/DL — SIGNIFICANT CHANGE UP (ref 70–99)
HCT VFR BLD CALC: 26.9 % — LOW (ref 37–47)
HGB BLD-MCNC: 8.7 G/DL — LOW (ref 12–16)
MCHC RBC-ENTMCNC: 28.9 PG — SIGNIFICANT CHANGE UP (ref 27–31)
MCHC RBC-ENTMCNC: 32.3 G/DL — SIGNIFICANT CHANGE UP (ref 32–37)
MCV RBC AUTO: 89.4 FL — SIGNIFICANT CHANGE UP (ref 81–99)
NRBC # BLD: 0 /100 WBCS — SIGNIFICANT CHANGE UP (ref 0–0)
PLATELET # BLD AUTO: 282 K/UL — SIGNIFICANT CHANGE UP (ref 130–400)
POTASSIUM SERPL-MCNC: 4.2 MMOL/L — SIGNIFICANT CHANGE UP (ref 3.5–5)
POTASSIUM SERPL-SCNC: 4.2 MMOL/L — SIGNIFICANT CHANGE UP (ref 3.5–5)
PROT SERPL-MCNC: 5.9 G/DL — LOW (ref 6–8)
RBC # BLD: 3.01 M/UL — LOW (ref 4.2–5.4)
RBC # FLD: 15.3 % — HIGH (ref 11.5–14.5)
SODIUM SERPL-SCNC: 136 MMOL/L — SIGNIFICANT CHANGE UP (ref 135–146)
SPECIMEN SOURCE: SIGNIFICANT CHANGE UP
TROPONIN T SERPL-MCNC: 0.02 NG/ML — HIGH
WBC # BLD: 13.59 K/UL — HIGH (ref 4.8–10.8)
WBC # FLD AUTO: 13.59 K/UL — HIGH (ref 4.8–10.8)

## 2019-01-13 RX ADMIN — PANTOPRAZOLE SODIUM 40 MILLIGRAM(S): 20 TABLET, DELAYED RELEASE ORAL at 07:37

## 2019-01-13 RX ADMIN — Medication 3 MILLILITER(S): at 20:25

## 2019-01-13 RX ADMIN — Medication 3 MILLILITER(S): at 11:21

## 2019-01-13 RX ADMIN — SEVELAMER CARBONATE 800 MILLIGRAM(S): 2400 POWDER, FOR SUSPENSION ORAL at 17:11

## 2019-01-13 RX ADMIN — Medication 50 MILLIGRAM(S): at 05:11

## 2019-01-13 RX ADMIN — LATANOPROST 1 DROP(S): 0.05 SOLUTION/ DROPS OPHTHALMIC; TOPICAL at 21:35

## 2019-01-13 RX ADMIN — Medication 250 MILLIGRAM(S): at 12:13

## 2019-01-13 RX ADMIN — MEROPENEM 100 MILLIGRAM(S): 1 INJECTION INTRAVENOUS at 17:10

## 2019-01-13 RX ADMIN — Medication 1 MILLIGRAM(S): at 12:12

## 2019-01-13 RX ADMIN — ATORVASTATIN CALCIUM 40 MILLIGRAM(S): 80 TABLET, FILM COATED ORAL at 21:35

## 2019-01-13 RX ADMIN — HEPARIN SODIUM 5000 UNIT(S): 5000 INJECTION INTRAVENOUS; SUBCUTANEOUS at 15:42

## 2019-01-13 RX ADMIN — MEROPENEM 100 MILLIGRAM(S): 1 INJECTION INTRAVENOUS at 05:11

## 2019-01-13 RX ADMIN — Medication 3 MILLILITER(S): at 08:12

## 2019-01-13 RX ADMIN — CALCITRIOL 0.25 MICROGRAM(S): 0.5 CAPSULE ORAL at 12:12

## 2019-01-13 RX ADMIN — Medication 40 MILLIGRAM(S): at 05:11

## 2019-01-13 RX ADMIN — SEVELAMER CARBONATE 800 MILLIGRAM(S): 2400 POWDER, FOR SUSPENSION ORAL at 07:37

## 2019-01-13 RX ADMIN — HEPARIN SODIUM 5000 UNIT(S): 5000 INJECTION INTRAVENOUS; SUBCUTANEOUS at 05:12

## 2019-01-13 RX ADMIN — Medication 50 MILLIGRAM(S): at 17:11

## 2019-01-13 RX ADMIN — HEPARIN SODIUM 5000 UNIT(S): 5000 INJECTION INTRAVENOUS; SUBCUTANEOUS at 21:35

## 2019-01-13 RX ADMIN — Medication 3 MILLILITER(S): at 15:45

## 2019-01-13 RX ADMIN — SEVELAMER CARBONATE 800 MILLIGRAM(S): 2400 POWDER, FOR SUSPENSION ORAL at 12:12

## 2019-01-13 NOTE — PROGRESS NOTE ADULT - SUBJECTIVE AND OBJECTIVE BOX
RACHEL SUMMERS  83y  Female    Patient is a 83y old  Female who presents with a chief complaint of lower abdominal pain (2019 11:12)      INTERVAL HPI/OVERNIGHT EVENTS: None- sleeping comfortably    T(C): 36.3 (19 @ 20:23), Max: 37.1 (19 @ 13:45)  HR: 82 (19 @ 20:23) (82 - 99)  BP: 140/78 (19 @ 20:23) (140/78 - 146/69)  RR: 18 (19 @ 20:23) (18 - 18)  SpO2: --  Wt(kg): --Vital Signs Last 24 Hrs  T(C): 36.3 (2019 20:23), Max: 37.1 (2019 13:45)  T(F): 97.3 (2019 20:23), Max: 98.8 (2019 13:45)  HR: 82 (2019 20:23) (82 - 99)  BP: 140/78 (2019 20:23) (140/78 - 146/69)  BP(mean): --  RR: 18 (2019 20:23) (18 - 18)  SpO2: --    PHYSICAL EXAM:  GENERAL: NAD, well-groomed, well-developed  HEAD:  Atraumatic, Normocephalic  NECK: Supple, No JVD, Normal thyroid  NERVOUS SYSTEM:  Alert & Oriented X3, Good concentration; Motor Strength 5/5 B/L upper and lower extremities; DTRs 2+ intact and symmetric  CHEST/LUNG: Clear to percussion bilaterally; No rales, rhonchi, wheezing, or rubs  HEART: Regular rate and rhythm; No murmurs, rubs, or gallops  ABDOMEN: Soft, Nontender, Nondistended; Bowel sounds present  EXTREMITIES:  2+ Peripheral Pulses, No clubbing, cyanosis, or edema    Consultant(s) Notes Reviewed:  [x ] YES  [ ] NO    Discussed with Consultants/Other Providers/Residents [ x] YES     LABS                         8.8    14.32 )-----------( 283      ( 2019 09:45 )             27.7         137  |  99  |  31<H>  ----------------------------<  151<H>  4.4   |  21  |  1.9<H>    Ca    8.4<L>      2019 09:45    TPro  5.9<L>  /  Alb  3.2<L>  /  TBili  0.3  /  DBili  x   /  AST  22  /  ALT  14  /  AlkPhos  84  01-12          Culture - Blood (collected 2019 21:14)  Source: .Blood Blood  Preliminary Report (2019 03:43):    No growth to date.      LIVER FUNCTIONS - ( 2019 09:45 )  Alb: 3.2 g/dL / Pro: 5.9 g/dL / ALK PHOS: 84 U/L / ALT: 14 U/L / AST: 22 U/L / GGT: x           CAPILLARY BLOOD GLUCOSE      POCT Blood Glucose.: 151 mg/dL (2019 20:23)    Urinalysis Basic - ( 2019 02:00 )    Color: Yellow / Appearance: Clear / S.010 / pH: x  Gluc: x / Ketone: Negative  / Bili: Negative / Urobili: 0.2 mg/dL   Blood: x / Protein: 30 mg/dL / Nitrite: Negative   Leuk Esterase: Negative / RBC: x / WBC x   Sq Epi: x / Non Sq Epi: Few /HPF / Bacteria: Few /HPF        RADIOLOGY & ADDITIONAL TESTS:    Imaging Personally Reviewed:  [x ] YES  [ ] NO    MEDICATIONS  (STANDING):  ALBUTerol/ipratropium for Nebulization 3 milliLiter(s) Nebulizer every 4 hours  atorvastatin 40 milliGRAM(s) Oral at bedtime  calcitriol   Capsule 0.25 MICROGram(s) Oral daily  folic acid 1 milliGRAM(s) Oral daily  furosemide   Injectable 40 milliGRAM(s) IV Push daily  heparin  Injectable 5000 Unit(s) SubCutaneous every 8 hours  latanoprost 0.005% Ophthalmic Solution 1 Drop(s) Both EYES at bedtime  meropenem  IVPB 1000 milliGRAM(s) IV Intermittent every 12 hours  meropenem  IVPB      metoprolol tartrate 50 milliGRAM(s) Oral two times a day  pantoprazole    Tablet 40 milliGRAM(s) Oral before breakfast  sevelamer hydrochloride 800 milliGRAM(s) Oral three times a day with meals  vancomycin  IVPB 750 milliGRAM(s) IV Intermittent daily    MEDICATIONS  (PRN):  ALBUTerol    90 MICROgram(s) HFA Inhaler 2 Puff(s) Inhalation every 4 hours PRN Shortness of Breath  guaiFENesin    Syrup 100 milliGRAM(s) Oral every 6 hours PRN Cough  LORazepam     Tablet 0.5 milliGRAM(s) Oral daily PRN Anxiety      HEALTH ISSUES - PROBLEM Dx:

## 2019-01-13 NOTE — CHART NOTE - NSCHARTNOTEFT_GEN_A_CORE
Pt complaining of increased CP. Same location and quality as before. Previously troponin has been downtrending. Unlikely to be ACS. F/U cardiac enzymes, ecg.

## 2019-01-13 NOTE — PROGRESS NOTE ADULT - ATTENDING COMMENTS
83 yr old female with pmhx of HTN, CKD 4, recent DVT/PE (thought provoked by travel, completed 6mo a/c and no longer on Eliquis) CHF s/p PPM, sigmoid diverticulitis treated with antibiotics p/w lower abdominal pain x 1 D duration. CTAP revealed sigmoid diverticulitis.    1. SOB post rapid response     improved with IV LAsix; started on Vanco & Merem f/u CXR Ucx      f/u ECG & labs     2DEcho EF 50%    2. Acute sigmoid diverticulitis      c/w cipro and flagyl IV       tolerating liquid diet well      GI eval unsuccessful     tolerating diet    3. Atypical chest discomfort, SOB, wheezing     resolved on nebulizers     ACS very unlikely    hx of PE,      f/u V/Q scan and b/l LE Doppler      4. OMERO on CKD 4      improving      baseline is 1.5      pre-renal; improved; now off IVF    5. Chronic HFpEF/ G2DD:     stable,      Lasix was stopped on admission given OMERO, now resumed    6.  Chronic normocytic anemia     baseline  9-10.      not taking iron pills d/t insurance issues.     7. Gout:     stable, not on meds    rom home  DVT ppx: Heparin SQ.

## 2019-01-14 LAB
ALBUMIN SERPL ELPH-MCNC: 2.7 G/DL — LOW (ref 3.5–5.2)
ALP SERPL-CCNC: 63 U/L — SIGNIFICANT CHANGE UP (ref 30–115)
ALT FLD-CCNC: 10 U/L — SIGNIFICANT CHANGE UP (ref 0–41)
ANION GAP SERPL CALC-SCNC: 14 MMOL/L — SIGNIFICANT CHANGE UP (ref 7–14)
AST SERPL-CCNC: 15 U/L — SIGNIFICANT CHANGE UP (ref 0–41)
BILIRUB SERPL-MCNC: 0.2 MG/DL — SIGNIFICANT CHANGE UP (ref 0.2–1.2)
BUN SERPL-MCNC: 24 MG/DL — HIGH (ref 10–20)
CALCIUM SERPL-MCNC: 8.1 MG/DL — LOW (ref 8.5–10.1)
CHLORIDE SERPL-SCNC: 98 MMOL/L — SIGNIFICANT CHANGE UP (ref 98–110)
CO2 SERPL-SCNC: 24 MMOL/L — SIGNIFICANT CHANGE UP (ref 17–32)
CREAT SERPL-MCNC: 1.7 MG/DL — HIGH (ref 0.7–1.5)
GLUCOSE SERPL-MCNC: 89 MG/DL — SIGNIFICANT CHANGE UP (ref 70–99)
HCT VFR BLD CALC: 27.6 % — LOW (ref 37–47)
HGB BLD-MCNC: 8.6 G/DL — LOW (ref 12–16)
MCHC RBC-ENTMCNC: 28.3 PG — SIGNIFICANT CHANGE UP (ref 27–31)
MCHC RBC-ENTMCNC: 31.2 G/DL — LOW (ref 32–37)
MCV RBC AUTO: 90.8 FL — SIGNIFICANT CHANGE UP (ref 81–99)
NRBC # BLD: 0 /100 WBCS — SIGNIFICANT CHANGE UP (ref 0–0)
PLATELET # BLD AUTO: 278 K/UL — SIGNIFICANT CHANGE UP (ref 130–400)
POTASSIUM SERPL-MCNC: 4 MMOL/L — SIGNIFICANT CHANGE UP (ref 3.5–5)
POTASSIUM SERPL-SCNC: 4 MMOL/L — SIGNIFICANT CHANGE UP (ref 3.5–5)
PROT SERPL-MCNC: 5.5 G/DL — LOW (ref 6–8)
RBC # BLD: 3.04 M/UL — LOW (ref 4.2–5.4)
RBC # FLD: 15.2 % — HIGH (ref 11.5–14.5)
SODIUM SERPL-SCNC: 136 MMOL/L — SIGNIFICANT CHANGE UP (ref 135–146)
WBC # BLD: 8.45 K/UL — SIGNIFICANT CHANGE UP (ref 4.8–10.8)
WBC # FLD AUTO: 8.45 K/UL — SIGNIFICANT CHANGE UP (ref 4.8–10.8)

## 2019-01-14 RX ORDER — FUROSEMIDE 40 MG
40 TABLET ORAL DAILY
Qty: 0 | Refills: 0 | Status: DISCONTINUED | OUTPATIENT
Start: 2019-01-15 | End: 2019-01-16

## 2019-01-14 RX ADMIN — Medication 1 MILLIGRAM(S): at 12:11

## 2019-01-14 RX ADMIN — CALCITRIOL 0.25 MICROGRAM(S): 0.5 CAPSULE ORAL at 12:11

## 2019-01-14 RX ADMIN — LATANOPROST 1 DROP(S): 0.05 SOLUTION/ DROPS OPHTHALMIC; TOPICAL at 21:44

## 2019-01-14 RX ADMIN — ATORVASTATIN CALCIUM 40 MILLIGRAM(S): 80 TABLET, FILM COATED ORAL at 21:44

## 2019-01-14 RX ADMIN — HEPARIN SODIUM 5000 UNIT(S): 5000 INJECTION INTRAVENOUS; SUBCUTANEOUS at 21:44

## 2019-01-14 RX ADMIN — Medication 3 MILLILITER(S): at 12:54

## 2019-01-14 RX ADMIN — MEROPENEM 100 MILLIGRAM(S): 1 INJECTION INTRAVENOUS at 18:16

## 2019-01-14 RX ADMIN — Medication 3 MILLILITER(S): at 20:33

## 2019-01-14 RX ADMIN — Medication 50 MILLIGRAM(S): at 05:37

## 2019-01-14 RX ADMIN — Medication 50 MILLIGRAM(S): at 18:16

## 2019-01-14 RX ADMIN — SEVELAMER CARBONATE 800 MILLIGRAM(S): 2400 POWDER, FOR SUSPENSION ORAL at 07:55

## 2019-01-14 RX ADMIN — SEVELAMER CARBONATE 800 MILLIGRAM(S): 2400 POWDER, FOR SUSPENSION ORAL at 18:16

## 2019-01-14 RX ADMIN — SEVELAMER CARBONATE 800 MILLIGRAM(S): 2400 POWDER, FOR SUSPENSION ORAL at 12:11

## 2019-01-14 RX ADMIN — Medication 40 MILLIGRAM(S): at 05:36

## 2019-01-14 RX ADMIN — Medication 250 MILLIGRAM(S): at 12:09

## 2019-01-14 RX ADMIN — Medication 3 MILLILITER(S): at 16:00

## 2019-01-14 RX ADMIN — MEROPENEM 100 MILLIGRAM(S): 1 INJECTION INTRAVENOUS at 05:37

## 2019-01-14 RX ADMIN — HEPARIN SODIUM 5000 UNIT(S): 5000 INJECTION INTRAVENOUS; SUBCUTANEOUS at 13:34

## 2019-01-14 RX ADMIN — Medication 3 MILLILITER(S): at 08:20

## 2019-01-14 RX ADMIN — HEPARIN SODIUM 5000 UNIT(S): 5000 INJECTION INTRAVENOUS; SUBCUTANEOUS at 05:37

## 2019-01-14 RX ADMIN — PANTOPRAZOLE SODIUM 40 MILLIGRAM(S): 20 TABLET, DELAYED RELEASE ORAL at 07:55

## 2019-01-14 NOTE — PROGRESS NOTE ADULT - SUBJECTIVE AND OBJECTIVE BOX
LENGTH OF HOSPITAL STAY: 5d    CHIEF COMPLAINT:   Patient is a 83y old  Female who presents with a chief complaint of lower abdominal pain (13 Jan 2019 04:29)      HISTORY OF PRESENTING ILLNESS:    HPI:  83 yr old female with pmhx of HTN, CKD 4, recent DVT/PE (thought provoked by travel, completed 6mo a/c and no longer on Eliquis) CHF s/p PPM, sigmoid diverticulitis treated with antibiotics p/w lower abdominal pain x 1 D duration. Acute, mild to moderate, constant, dull, suprapubic and left lower abdominal a/w burning urination but no fever,  diarrhoea or constipation.     She was admitted in hospital in november 2018 for CHF exacerbation and course was complicated by Sigmoid diverticulitis and she was treated with antibiotics. (09 Jan 2019 12:16)    Patient feels better today.  A rapid response was called friday pm due to patient having increased sob, chest pain and fever.  Patient's antibiotics were changed to vanco and merepenem.  Given respiratory treatments and lasix.  Felt better.      PAST MEDICAL & SURGICAL HISTORY  PAST MEDICAL & SURGICAL HISTORY:  Diverticulitis: sigmoid  Gout  Hypertension  Pacemaker  Chronic kidney disease  Artificial pacemaker    SOCIAL HISTORY:    ALLERGIES:  Keflex (Unknown)    MEDICATIONS:  STANDING MEDICATIONS  ALBUTerol/ipratropium for Nebulization 3 milliLiter(s) Nebulizer every 4 hours  atorvastatin 40 milliGRAM(s) Oral at bedtime  calcitriol   Capsule 0.25 MICROGram(s) Oral daily  folic acid 1 milliGRAM(s) Oral daily  furosemide   Injectable 40 milliGRAM(s) IV Push daily  heparin  Injectable 5000 Unit(s) SubCutaneous every 8 hours  latanoprost 0.005% Ophthalmic Solution 1 Drop(s) Both EYES at bedtime  meropenem  IVPB 1000 milliGRAM(s) IV Intermittent every 12 hours  meropenem  IVPB      metoprolol tartrate 50 milliGRAM(s) Oral two times a day  pantoprazole    Tablet 40 milliGRAM(s) Oral before breakfast  sevelamer hydrochloride 800 milliGRAM(s) Oral three times a day with meals  vancomycin  IVPB 750 milliGRAM(s) IV Intermittent daily    PRN MEDICATIONS  ALBUTerol    90 MICROgram(s) HFA Inhaler 2 Puff(s) Inhalation every 4 hours PRN  guaiFENesin    Syrup 100 milliGRAM(s) Oral every 6 hours PRN  LORazepam     Tablet 0.5 milliGRAM(s) Oral daily PRN    VITALS:   T(F): 97.9  HR: 79  BP: 150/70  RR: 18  SpO2: --    LABS:                        8.6    8.45  )-----------( 278      ( 14 Jan 2019 06:18 )             27.6     01-14    136  |  98  |  24<H>  ----------------------------<  89  4.0   |  24  |  1.7<H>    Ca    8.1<L>      14 Jan 2019 06:18    TPro  5.5<L>  /  Alb  2.7<L>  /  TBili  0.2  /  DBili  x   /  AST  15  /  ALT  10  /  AlkPhos  63  01-14          Troponin T, Serum: 0.02 ng/mL <H> (01-13-19 @ 12:39)      Culture - Urine (collected 12 Jan 2019 02:00)  Source: .Urine Clean Catch (Midstream)  Final Report (13 Jan 2019 22:41):    No growth    Culture - Blood (collected 11 Jan 2019 21:14)  Source: .Blood Blood  Preliminary Report (13 Jan 2019 03:43):    No growth to date.      CARDIAC MARKERS ( 13 Jan 2019 12:39 )  x     / 0.02 ng/mL / x     / x     / x      CARDIAC MARKERS ( 12 Jan 2019 09:45 )  x     / 0.02 ng/mL / 79 U/L / x     / 2.7 ng/mL      RADIOLOGY:    PHYSICAL EXAM:  GEN: No acute distress  HEENT:   LUNGS: Clear to auscultation bilaterally   HEART: S1/S2 present. RRR.   ABD: Soft, non-tender, non-distended. Bowel sounds present  EXT:  NEURO: AAOX3

## 2019-01-14 NOTE — PROGRESS NOTE ADULT - SUBJECTIVE AND OBJECTIVE BOX
The patient had SOB after V/Q scan last week. The patient is on antibiotics . Afebrile this morning . SOB improved wit antibitoics and IV Lasix.     PHYSICAL EXAM:  Vital Signs Last 24 Hrs  T(C): 36.6 (2019 05:36), Max: 36.8 (2019 20:44)  T(F): 97.9 (2019 05:36), Max: 98.2 (2019 20:44)  HR: 79 (2019 05:36) (76 - 82)  BP: 150/70 (2019 05:36) (119/58 - 150/70)  BP(mean): --  RR: 18 (2019 05:36) (18 - 18)  SpO2: --  Daily     Daily Weight in k.3 (2019 05:36)  I&O's Detail    2019 07:01  -  2019 07:00  --------------------------------------------------------  IN:    Oral Fluid: 250 mL  Total IN: 250 mL    OUT:    Voided: 425 mL  Total OUT: 425 mL    Total NET: -175 mL      2019 07:01  -  2019 09:44  --------------------------------------------------------  IN:  Total IN: 0 mL    OUT:    Voided: 550 mL  Total OUT: 550 mL    Total NET: -550 mL        GENERAL: NAD, well-groomed, well-developed  HEAD:  Atraumatic, Normocephalic  NECK: Supple, No JVD,  NERVOUS SYSTEM:  Alert & Oriented X3,   CHEST/LUNG: Clear   HEART: Regular rate and rhythm; No murmurs, rubs, or gallops  ABDOMEN: Soft, Nontender, Nondistended; Bowel sounds present  EXTREMITIES:  No edema         LABS:                        8.6    8.45  )-----------( 278      ( 2019 06:18 )             27.6     -14    136  |  98  |  24<H>  ----------------------------<  89  4.0   |  24  |  1.7<H>    Ca    8.1<L>      2019 06:18    TPro  5.5<L>  /  Alb  2.7<L>  /  TBili  0.2  /  DBili  x   /  AST  15  /  ALT  10  /  AlkPhos  63  01-14      MEDICATIONS  (STANDING):  ALBUTerol/ipratropium for Nebulization 3 milliLiter(s) Nebulizer every 4 hours  atorvastatin 40 milliGRAM(s) Oral at bedtime  calcitriol   Capsule 0.25 MICROGram(s) Oral daily  folic acid 1 milliGRAM(s) Oral daily  furosemide   Injectable 40 milliGRAM(s) IV Push daily  heparin  Injectable 5000 Unit(s) SubCutaneous every 8 hours  latanoprost 0.005% Ophthalmic Solution 1 Drop(s) Both EYES at bedtime  meropenem  IVPB 1000 milliGRAM(s) IV Intermittent every 12 hours  meropenem  IVPB      metoprolol tartrate 50 milliGRAM(s) Oral two times a day  pantoprazole    Tablet 40 milliGRAM(s) Oral before breakfast  sevelamer hydrochloride 800 milliGRAM(s) Oral three times a day with meals  vancomycin  IVPB 750 milliGRAM(s) IV Intermittent daily    MEDICATIONS  (PRN):  ALBUTerol    90 MICROgram(s) HFA Inhaler 2 Puff(s) Inhalation every 4 hours PRN Shortness of Breath  guaiFENesin    Syrup 100 milliGRAM(s) Oral every 6 hours PRN Cough  LORazepam     Tablet 0.5 milliGRAM(s) Oral daily PRN Anxiety    CARDIAC MARKERS ( 2019 12:39 )  x     / 0.02 ng/mL / x     / x     / x      CARDIAC MARKERS ( 2019 09:45 )  x     / 0.02 ng/mL / 79 U/L / x     / 2.7 ng/mL

## 2019-01-14 NOTE — PROGRESS NOTE ADULT - ASSESSMENT
-acute diverticulitis , on ABX   -mildly positive troponin  in setting of dehydration, infection, CKD  -CHF right pleural effusion . Had LV dysfunction which had improved with CRT . Improved with IV Lasix   -Hx of provoked PE in the past , now with Soleal vein thrombosis. Not thought to be benefit from A/C at this time. Pt is also anemic.       Plan:  continue statin  continue metoprolol  ABx as by primary team  Change to po Lasix in AM   Follow electrolytes and renal function .

## 2019-01-14 NOTE — PROGRESS NOTE ADULT - SUBJECTIVE AND OBJECTIVE BOX
SUBJECTIVE:    Patient is a 83y old Female who presents with a chief complaint of lower abdominal pain (14 Jan 2019 09:43)    Currently admitted to medicine with the primary diagnosis of Diverticulitis     Today is hospital day 5d. Overnight no event. Denied CP    PAST MEDICAL & SURGICAL HISTORY  Diverticulitis: sigmoid  Gout  Hypertension  Pacemaker  Chronic kidney disease  Artificial pacemaker        ALLERGIES:  Keflex (Unknown)    MEDICATIONS:  STANDING MEDICATIONS  ALBUTerol/ipratropium for Nebulization 3 milliLiter(s) Nebulizer every 4 hours  atorvastatin 40 milliGRAM(s) Oral at bedtime  calcitriol   Capsule 0.25 MICROGram(s) Oral daily  folic acid 1 milliGRAM(s) Oral daily  furosemide   Injectable 40 milliGRAM(s) IV Push daily  heparin  Injectable 5000 Unit(s) SubCutaneous every 8 hours  latanoprost 0.005% Ophthalmic Solution 1 Drop(s) Both EYES at bedtime  meropenem  IVPB      meropenem  IVPB 1000 milliGRAM(s) IV Intermittent every 12 hours  metoprolol tartrate 50 milliGRAM(s) Oral two times a day  pantoprazole    Tablet 40 milliGRAM(s) Oral before breakfast  sevelamer hydrochloride 800 milliGRAM(s) Oral three times a day with meals  vancomycin  IVPB 750 milliGRAM(s) IV Intermittent daily    PRN MEDICATIONS  ALBUTerol    90 MICROgram(s) HFA Inhaler 2 Puff(s) Inhalation every 4 hours PRN  guaiFENesin    Syrup 100 milliGRAM(s) Oral every 6 hours PRN  LORazepam     Tablet 0.5 milliGRAM(s) Oral daily PRN    VITALS:   T(F): 97.9  HR: 79  BP: 150/70  RR: 18  SpO2: --    LABS:                        8.6    8.45  )-----------( 278      ( 14 Jan 2019 06:18 )             27.6     01-14    136  |  98  |  24<H>  ----------------------------<  89  4.0   |  24  |  1.7<H>    Ca    8.1<L>      14 Jan 2019 06:18    TPro  5.5<L>  /  Alb  2.7<L>  /  TBili  0.2  /  DBili  x   /  AST  15  /  ALT  10  /  AlkPhos  63  01-14          Troponin T, Serum: 0.02 ng/mL <H> (01-13-19 @ 12:39)      Culture - Urine (collected 12 Jan 2019 02:00)  Source: .Urine Clean Catch (Midstream)  Final Report (13 Jan 2019 22:41):    No growth    Culture - Blood (collected 11 Jan 2019 21:14)  Source: .Blood Blood  Preliminary Report (13 Jan 2019 03:43):    No growth to date.      CARDIAC MARKERS ( 13 Jan 2019 12:39 )  x     / 0.02 ng/mL / x     / x     / x          RADIOLOGY:    PHYSICAL EXAM:  GENERAL: NAD, speaks in full sentences, no signs of respiratory distress  HEAD:  Atraumatic, Normocephalic  EYES: EOMI, PERRLA, conjunctiva and sclera clear  NECK: Supple, No JVD  PULM: CTAB; No wheeze; No crackles; No accessory muscles used  CVA: Regular rate and rhythm; No murmurs;   ABDOMEN: Soft, Nontender, Nondistended; Bowel sounds present; No guarding  EXTREMITIES:  2+ Peripheral Pulses, No cyanosis or edema  PSYCH: AAOx3  NEUROLOGY: non-focal  SKIN: No rashes or lesions    Intravenous access:   NG tube:   Morgan Catheter:       < from: Transthoracic Echocardiogram (11.05.18 @ 09:56) >   1. Left ventricular ejection fraction, by visual estimation, is 55 to   60%.   2. Normal global left ventricular systolic function.   3. Spectral Doppler shows pseudonormal pattern of left ventricular   myocardial filling (Grade II diastolic dysfunction).   4. Mild-to-moderate concentric left ventricular hypertrophy.   5. Mild mitral regurgitation.   6. Mild tricuspid regurgitation.   7. Estimated pulmonary artery systolic pressure is 42.1 mmHg assuming a   right atrial pressure of 8 mmHg, which is consistent with mild pulmonary   hypertension.    < end of copied text > SUBJECTIVE:    Patient is a 83y old Female who presents with a chief complaint of lower abdominal pain (14 Jan 2019 09:43)    Currently admitted to medicine with the primary diagnosis of Diverticulitis     Today is hospital day 5d. Overnight no event. Denied CP, SOB on 2L NC, abd pain, urinary/BM issues.    PAST MEDICAL & SURGICAL HISTORY  Diverticulitis: sigmoid  Gout  Hypertension  Pacemaker  Chronic kidney disease  Artificial pacemaker        ALLERGIES:  Keflex (Unknown)    MEDICATIONS:  STANDING MEDICATIONS  ALBUTerol/ipratropium for Nebulization 3 milliLiter(s) Nebulizer every 4 hours  atorvastatin 40 milliGRAM(s) Oral at bedtime  calcitriol   Capsule 0.25 MICROGram(s) Oral daily  folic acid 1 milliGRAM(s) Oral daily  furosemide   Injectable 40 milliGRAM(s) IV Push daily  heparin  Injectable 5000 Unit(s) SubCutaneous every 8 hours  latanoprost 0.005% Ophthalmic Solution 1 Drop(s) Both EYES at bedtime  meropenem  IVPB      meropenem  IVPB 1000 milliGRAM(s) IV Intermittent every 12 hours  metoprolol tartrate 50 milliGRAM(s) Oral two times a day  pantoprazole    Tablet 40 milliGRAM(s) Oral before breakfast  sevelamer hydrochloride 800 milliGRAM(s) Oral three times a day with meals  vancomycin  IVPB 750 milliGRAM(s) IV Intermittent daily    PRN MEDICATIONS  ALBUTerol    90 MICROgram(s) HFA Inhaler 2 Puff(s) Inhalation every 4 hours PRN  guaiFENesin    Syrup 100 milliGRAM(s) Oral every 6 hours PRN  LORazepam     Tablet 0.5 milliGRAM(s) Oral daily PRN    VITALS:   T(F): 97.9  HR: 79  BP: 150/70  RR: 18  SpO2: --    LABS:                        8.6    8.45  )-----------( 278      ( 14 Jan 2019 06:18 )             27.6     01-14    136  |  98  |  24<H>  ----------------------------<  89  4.0   |  24  |  1.7<H>    Ca    8.1<L>      14 Jan 2019 06:18    TPro  5.5<L>  /  Alb  2.7<L>  /  TBili  0.2  /  DBili  x   /  AST  15  /  ALT  10  /  AlkPhos  63  01-14          Troponin T, Serum: 0.02 ng/mL <H> (01-13-19 @ 12:39)      Culture - Urine (collected 12 Jan 2019 02:00)  Source: .Urine Clean Catch (Midstream)  Final Report (13 Jan 2019 22:41):    No growth    Culture - Blood (collected 11 Jan 2019 21:14)  Source: .Blood Blood  Preliminary Report (13 Jan 2019 03:43):    No growth to date.      CARDIAC MARKERS ( 13 Jan 2019 12:39 )  x     / 0.02 ng/mL / x     / x     / x          RADIOLOGY:    PHYSICAL EXAM:  General: Not in distress.   HEENT: Moist mucus membranes. PERRLA.  Cardio: Regular rate and rhythm, S1, S2,   Pulm: mild b/l crackles  Abdomen: Soft, non-tender, non-distended.   Extremities: No cyanosis or edema bilaterally.   Neuro: A&O x3. No focal deficits.       < from: Transthoracic Echocardiogram (11.05.18 @ 09:56) >   1. Left ventricular ejection fraction, by visual estimation, is 55 to   60%.   2. Normal global left ventricular systolic function.   3. Spectral Doppler shows pseudonormal pattern of left ventricular   myocardial filling (Grade II diastolic dysfunction).   4. Mild-to-moderate concentric left ventricular hypertrophy.   5. Mild mitral regurgitation.   6. Mild tricuspid regurgitation.   7. Estimated pulmonary artery systolic pressure is 42.1 mmHg assuming a   right atrial pressure of 8 mmHg, which is consistent with mild pulmonary   hypertension.    < end of copied text >

## 2019-01-14 NOTE — PROGRESS NOTE ADULT - ASSESSMENT
83 yr old female with pmhx of HTN, CKD 4, recent DVT/PE (thought provoked by travel, completed 6mo a/c and no longer on Eliquis) CHF s/p PPM, sigmoid diverticulitis treated with antibiotics p/w lower abdominal pain x 1 D duration. CTAP revealed sigmoid diverticulitis.    # Acute sigmoid diverticulitis:  - c/w cipro and flagyl IV. Possibly 24-48 more hours of IV Antibiotics.  - Pt  tolerating liquid diet well.  - GI consult (Private)  placed on 01/09; was called in twice, but pt yet to be seen. Called the office myself at [phone], but no answer.    #Acute on chronic diastolic heart failure   - Vascular congestion on CXR, crackles on physical exam, BNP 30,198  -Pt clinically improved today  - IV lasix 40mg QD started yesterday. home lasix was stopped on admission given slightly worsening kidney function.  -MONITOR KIDNEY FUNCTION, and if Cr continues to worsen tmrw, switch to PO lasix/metolazone and consider consulting nephrology.  - 2-D echo: LVEF 55-60%. GIIDD. Mild pulm    #Fever, leukocytosis  - No infiltrates on CXR, f/u official read  - F/u uCx, Blood Cx  -Pt started on vanco + Lillian overnight    # OMERO on CKD 4  - Cr baseline is 1.5  - was likely pre-renal; improved on IVF, but pt volume overloaded.      # Chronic normocytic anemia:  - baseline hb 9-10.   - not taking iron pills d/t insurance issues.     # Gout:  - stable, not on meds      #DVT    DVT ppx: Heparin SQ  NPO for now      Above reviewed.  Patient should get a cardiac follow up.  No change in meds at present.  Eliquis d/cameron although patient has a soleal DVT due risk outweigh benifits of treatment.

## 2019-01-14 NOTE — PROGRESS NOTE ADULT - ASSESSMENT
84 yo F PMH HTN, CKD 4, recent DVT/PE (thought provoked by travel, completed 6mo a/c and no longer on Eliquis) CHF s/p PPM, sigmoid diverticulitis treated with antibiotics p/w lower abdominal pain x 1 D duration. CTAP revealed sigmoid diverticulitis.    # Acute sigmoid diverticulitis:  - Abd pain improving  - was on cipro and flagyl IV  - Now on vanco and pratik due to rapid response 1/11 with T 102.5 and leukocytosis  - ID consult pending  - Pt  tolerating liquid diet well.  - GI consult Dr Marrero [phone], will see her tonight    #Fever, leukocytosis 1/11 while on cipro and flagyl  - UA neg  - BCx NGT  - CXR 1/12- stable R pleural effusion  -On vanc and zosyn since 1/11 fever  -ID eval    #Acute on chronic diastolic heart failure  -Pt clinically better. Denied SOB on 2 L NC, O2 sat 100%. Did not use home O2, wean off NC. Check pulse ox on RA   - Vascular congestion on CXR, crackles on physical exam, BNP 30,198  -Cardio recs abi.  - Switch to po lasix 40 from IV tomorrow. Lasix restarted 1/11 (rapid response for SOB due to CHF exacerbation). Home lasix was stopped on admission given slightly worsening kidney function.  - Cr trending down 1.7 today, was 1.9 yesterday  - 2-D echo 11/5: LVEF 55-60%. GIIDD. Mild pulm HTN. LVH    #Atypical chest discomfort, SOB, wheezing     resolved on nebulizers     ACS very unlikely  Cardio recs abi- trop stable 0.02, mildly positive troponin  in setting of dehydration, infection, CKD    hx of PE, V/Q scan 1/11- low prob PE      # OMERO on CKD 4  - Cr baseline is 1.5. Today 1.7, trending down.  - was likely pre-renal; improved on IVF, but pt volume overloaded and restarted lasix.    # Chronic normocytic anemia:  - baseline hb 9-10.  8.6 today  - not taking iron pills d/t insurance issues.     # Gout:  - stable, not on meds      #DVT  Eliquis d/cameron although patient has a soleal DVT due risk outweigh benefits of treatment.      DVT ppx: Heparin 5000 q8h  clear liquid diet. Advance as tolerated    D/w Dr. Celeste 82 yo F PMH HTN, CKD 4, recent DVT/PE (thought provoked by travel, completed 6mo a/c and no longer on Eliquis) CHF s/p PPM, sigmoid diverticulitis treated with antibiotics p/w lower abdominal pain x 1 D duration. CTAP revealed sigmoid diverticulitis.    # Acute sigmoid diverticulitis:  - Abd pain improving  - was on cipro and flagyl IV  - Now on vanco and pratik due to rapid response 1/11 with T 102.5 and leukocytosis  - ID consult pending  - Pt  tolerating liquid diet well. Advance to soft  - GI consult Dr Marrero [phone], will see her tonight    #Fever, leukocytosis 1/11 while on cipro and flagyl  - UA neg  - BCx NGT  - CXR 1/12- stable R pleural effusion  -On vanc and zosyn since 1/11 fever  -ID eval    #Acute on chronic diastolic heart failure  -Pt clinically better. Denied SOB on 2 L NC, O2 sat 100%. Did not use home O2, wean off NC. Check pulse ox on RA   - Vascular congestion on CXR, crackles on physical exam, BNP 30,198  -Cardio recs abi.  - Switch to po lasix 40 from IV tomorrow. Lasix restarted 1/11 (rapid response for SOB due to CHF exacerbation). Home lasix was stopped on admission given slightly worsening kidney function.  - Cr trending down 1.7 today, was 1.9 yesterday  - 2-D echo 11/5: LVEF 55-60%. GIIDD. Mild pulm HTN. LVH    #Atypical chest discomfort, SOB, wheezing     resolved on nebulizers     ACS very unlikely  Cardio recs abi- trop stable 0.02, mildly positive troponin  in setting of dehydration, infection, CKD    hx of PE, V/Q scan 1/11- low prob PE      # OMERO on CKD 4  - Cr baseline is 1.5. Today 1.7, trending down.  - was likely pre-renal; improved on IVF, but pt volume overloaded and restarted lasix.    # Chronic normocytic anemia:  - baseline hb 9-10.  8.6 today  - not taking iron pills d/t insurance issues.     # Gout:  - stable, not on meds      #DVT  Eliquis d/cameron although patient has a soleal DVT due risk outweigh benefits of treatment.      DVT ppx: Heparin 5000 q8h  Diet soft fiber restrict, DASH/TLC. Advance as tolerated    D/w Dr. Celeste

## 2019-01-15 DIAGNOSIS — I82.409 ACUTE EMBOLISM AND THROMBOSIS OF UNSPECIFIED DEEP VEINS OF UNSPECIFIED LOWER EXTREMITY: ICD-10-CM

## 2019-01-15 DIAGNOSIS — M10.9 GOUT, UNSPECIFIED: ICD-10-CM

## 2019-01-15 DIAGNOSIS — I50.33 ACUTE ON CHRONIC DIASTOLIC (CONGESTIVE) HEART FAILURE: ICD-10-CM

## 2019-01-15 DIAGNOSIS — N39.0 URINARY TRACT INFECTION, SITE NOT SPECIFIED: ICD-10-CM

## 2019-01-15 DIAGNOSIS — N18.9 CHRONIC KIDNEY DISEASE, UNSPECIFIED: ICD-10-CM

## 2019-01-15 DIAGNOSIS — Z95.0 PRESENCE OF CARDIAC PACEMAKER: ICD-10-CM

## 2019-01-15 DIAGNOSIS — K57.92 DIVERTICULITIS OF INTESTINE, PART UNSPECIFIED, WITHOUT PERFORATION OR ABSCESS WITHOUT BLEEDING: ICD-10-CM

## 2019-01-15 LAB
-  AMPICILLIN: SIGNIFICANT CHANGE UP
-  CIPROFLOXACIN: SIGNIFICANT CHANGE UP
-  DAPTOMYCIN: SIGNIFICANT CHANGE UP
-  LEVOFLOXACIN: SIGNIFICANT CHANGE UP
-  LINEZOLID: SIGNIFICANT CHANGE UP
-  NITROFURANTOIN: SIGNIFICANT CHANGE UP
-  TETRACYCLINE: SIGNIFICANT CHANGE UP
-  VANCOMYCIN: SIGNIFICANT CHANGE UP
ALBUMIN SERPL ELPH-MCNC: 3.1 G/DL — LOW (ref 3.5–5.2)
ALP SERPL-CCNC: 75 U/L — SIGNIFICANT CHANGE UP (ref 30–115)
ALT FLD-CCNC: 10 U/L — SIGNIFICANT CHANGE UP (ref 0–41)
ANION GAP SERPL CALC-SCNC: 14 MMOL/L — SIGNIFICANT CHANGE UP (ref 7–14)
AST SERPL-CCNC: 17 U/L — SIGNIFICANT CHANGE UP (ref 0–41)
BILIRUB SERPL-MCNC: <0.2 MG/DL — SIGNIFICANT CHANGE UP (ref 0.2–1.2)
BLD GP AB SCN SERPL QL: SIGNIFICANT CHANGE UP
BUN SERPL-MCNC: 22 MG/DL — HIGH (ref 10–20)
CALCIUM SERPL-MCNC: 8.5 MG/DL — SIGNIFICANT CHANGE UP (ref 8.5–10.1)
CHLORIDE SERPL-SCNC: 97 MMOL/L — LOW (ref 98–110)
CO2 SERPL-SCNC: 23 MMOL/L — SIGNIFICANT CHANGE UP (ref 17–32)
CREAT SERPL-MCNC: 1.7 MG/DL — HIGH (ref 0.7–1.5)
CULTURE RESULTS: SIGNIFICANT CHANGE UP
GLUCOSE SERPL-MCNC: 82 MG/DL — SIGNIFICANT CHANGE UP (ref 70–99)
HCT VFR BLD CALC: 29.3 % — LOW (ref 37–47)
HGB BLD-MCNC: 9.1 G/DL — LOW (ref 12–16)
IRON SATN MFR SERPL: 24 % — SIGNIFICANT CHANGE UP (ref 15–50)
IRON SATN MFR SERPL: 47 UG/DL — SIGNIFICANT CHANGE UP (ref 35–150)
LDH SERPL L TO P-CCNC: 276 — HIGH (ref 50–242)
MAGNESIUM SERPL-MCNC: 1.4 MG/DL — LOW (ref 1.8–2.4)
MCHC RBC-ENTMCNC: 28 PG — SIGNIFICANT CHANGE UP (ref 27–31)
MCHC RBC-ENTMCNC: 31.1 G/DL — LOW (ref 32–37)
MCV RBC AUTO: 90.2 FL — SIGNIFICANT CHANGE UP (ref 81–99)
METHOD TYPE: SIGNIFICANT CHANGE UP
NRBC # BLD: 0 /100 WBCS — SIGNIFICANT CHANGE UP (ref 0–0)
ORGANISM # SPEC MICROSCOPIC CNT: SIGNIFICANT CHANGE UP
ORGANISM # SPEC MICROSCOPIC CNT: SIGNIFICANT CHANGE UP
PLATELET # BLD AUTO: 317 K/UL — SIGNIFICANT CHANGE UP (ref 130–400)
POTASSIUM SERPL-MCNC: 4.1 MMOL/L — SIGNIFICANT CHANGE UP (ref 3.5–5)
POTASSIUM SERPL-SCNC: 4.1 MMOL/L — SIGNIFICANT CHANGE UP (ref 3.5–5)
PROT SERPL-MCNC: 6.1 G/DL — SIGNIFICANT CHANGE UP (ref 6–8)
RBC # BLD: 3.25 M/UL — LOW (ref 4.2–5.4)
RBC # FLD: 15 % — HIGH (ref 11.5–14.5)
SODIUM SERPL-SCNC: 134 MMOL/L — LOW (ref 135–146)
SPECIMEN SOURCE: SIGNIFICANT CHANGE UP
TIBC SERPL-MCNC: 194 UG/DL — LOW (ref 220–430)
TRANSFERRIN SERPL-MCNC: 163 MG/DL — LOW (ref 200–360)
TYPE + AB SCN PNL BLD: SIGNIFICANT CHANGE UP
UIBC SERPL-MCNC: 147 UG/DL — SIGNIFICANT CHANGE UP (ref 110–370)
WBC # BLD: 11.46 K/UL — HIGH (ref 4.8–10.8)
WBC # FLD AUTO: 11.46 K/UL — HIGH (ref 4.8–10.8)

## 2019-01-15 RX ORDER — CIPROFLOXACIN LACTATE 400MG/40ML
250 VIAL (ML) INTRAVENOUS EVERY 12 HOURS
Qty: 0 | Refills: 0 | Status: DISCONTINUED | OUTPATIENT
Start: 2019-01-15 | End: 2019-01-16

## 2019-01-15 RX ORDER — MAGNESIUM SULFATE 500 MG/ML
2 VIAL (ML) INJECTION ONCE
Qty: 0 | Refills: 0 | Status: COMPLETED | OUTPATIENT
Start: 2019-01-15 | End: 2019-01-15

## 2019-01-15 RX ORDER — METRONIDAZOLE 500 MG
500 TABLET ORAL EVERY 12 HOURS
Qty: 0 | Refills: 0 | Status: DISCONTINUED | OUTPATIENT
Start: 2019-01-15 | End: 2019-01-16

## 2019-01-15 RX ADMIN — MEROPENEM 100 MILLIGRAM(S): 1 INJECTION INTRAVENOUS at 05:15

## 2019-01-15 RX ADMIN — HEPARIN SODIUM 5000 UNIT(S): 5000 INJECTION INTRAVENOUS; SUBCUTANEOUS at 05:14

## 2019-01-15 RX ADMIN — Medication 3 MILLILITER(S): at 19:49

## 2019-01-15 RX ADMIN — SEVELAMER CARBONATE 800 MILLIGRAM(S): 2400 POWDER, FOR SUSPENSION ORAL at 14:27

## 2019-01-15 RX ADMIN — Medication 1 MILLIGRAM(S): at 14:31

## 2019-01-15 RX ADMIN — HEPARIN SODIUM 5000 UNIT(S): 5000 INJECTION INTRAVENOUS; SUBCUTANEOUS at 21:24

## 2019-01-15 RX ADMIN — ATORVASTATIN CALCIUM 40 MILLIGRAM(S): 80 TABLET, FILM COATED ORAL at 21:24

## 2019-01-15 RX ADMIN — Medication 250 MILLIGRAM(S): at 18:58

## 2019-01-15 RX ADMIN — Medication 25 GRAM(S): at 14:29

## 2019-01-15 RX ADMIN — Medication 40 MILLIGRAM(S): at 05:14

## 2019-01-15 RX ADMIN — CALCITRIOL 0.25 MICROGRAM(S): 0.5 CAPSULE ORAL at 14:26

## 2019-01-15 RX ADMIN — Medication 3 MILLILITER(S): at 16:04

## 2019-01-15 RX ADMIN — SEVELAMER CARBONATE 800 MILLIGRAM(S): 2400 POWDER, FOR SUSPENSION ORAL at 08:29

## 2019-01-15 RX ADMIN — Medication 250 MILLIGRAM(S): at 12:36

## 2019-01-15 RX ADMIN — PANTOPRAZOLE SODIUM 40 MILLIGRAM(S): 20 TABLET, DELAYED RELEASE ORAL at 08:29

## 2019-01-15 RX ADMIN — Medication 60 MILLIGRAM(S): at 14:30

## 2019-01-15 RX ADMIN — Medication 3 MILLILITER(S): at 11:26

## 2019-01-15 RX ADMIN — HEPARIN SODIUM 5000 UNIT(S): 5000 INJECTION INTRAVENOUS; SUBCUTANEOUS at 14:27

## 2019-01-15 RX ADMIN — Medication 50 MILLIGRAM(S): at 17:33

## 2019-01-15 RX ADMIN — Medication 3 MILLILITER(S): at 08:59

## 2019-01-15 RX ADMIN — SEVELAMER CARBONATE 800 MILLIGRAM(S): 2400 POWDER, FOR SUSPENSION ORAL at 17:33

## 2019-01-15 RX ADMIN — Medication 50 MILLIGRAM(S): at 05:14

## 2019-01-15 RX ADMIN — LATANOPROST 1 DROP(S): 0.05 SOLUTION/ DROPS OPHTHALMIC; TOPICAL at 21:24

## 2019-01-15 RX ADMIN — Medication 500 MILLIGRAM(S): at 18:36

## 2019-01-15 NOTE — CONSULT NOTE ADULT - SUBJECTIVE AND OBJECTIVE BOX
RACHEL SUMMERS  83y, Female  Allergy: Keflex (Unknown)      HPI:  83 yr old female with pmhx of HTN, CKD 4, recent DVT/PE (thought provoked by travel, completed 6mo a/c and no longer on Eliquis) CHF s/p PPM, sigmoid diverticulitis treated with antibiotics p/w lower abdominal pain x 1 D duration. Acute, mild to moderate, constant, dull, suprapubic and left lower abdominal a/w burning urination but no fever,  diarrhoea or constipation.     She was admitted in hospital in november 2018 for CHF exacerbation and course was complicated by Sigmoid diverticulitis and she was treated with antibiotics. (09 Jan 2019 12:16)    FAMILY HISTORY:  No pertinent family history in first degree relatives    PAST MEDICAL & SURGICAL HISTORY:  Diverticulitis: sigmoid  Gout  Hypertension  Pacemaker  Chronic kidney disease  Artificial pacemaker        VITALS:  T(F): 98.3, Max: 98.6 (01-14-19 @ 21:03)  HR: 98  BP: 136/77  RR: 18Vital Signs Last 24 Hrs  T(C): 36.8 (15 Navin 2019 05:22), Max: 37 (14 Jan 2019 21:03)  T(F): 98.3 (15 Navin 2019 05:22), Max: 98.6 (14 Jan 2019 21:03)  HR: 98 (15 Navin 2019 05:22) (71 - 98)  BP: 136/77 (15 Navin 2019 05:22) (128/70 - 159/74)  BP(mean): --  RR: 18 (15 Navin 2019 05:22) (18 - 18)  SpO2: 96% (15 Navin 2019 05:22) (96% - 98%)    TESTS & MEASUREMENTS:                        8.6    8.45  )-----------( 278      ( 14 Jan 2019 06:18 )             27.6     01-14    136  |  98  |  24<H>  ----------------------------<  89  4.0   |  24  |  1.7<H>    Ca    8.1<L>      14 Jan 2019 06:18    TPro  5.5<L>  /  Alb  2.7<L>  /  TBili  0.2  /  DBili  x   /  AST  15  /  ALT  10  /  AlkPhos  63  01-14    LIVER FUNCTIONS - ( 14 Jan 2019 06:18 )  Alb: 2.7 g/dL / Pro: 5.5 g/dL / ALK PHOS: 63 U/L / ALT: 10 U/L / AST: 15 U/L / GGT: x             Culture - Urine (collected 01-12-19 @ 22:42)  Source: .Urine Clean Catch (Midstream)  Preliminary Report (01-14-19 @ 13:20):    10,000 - 49,000 CFU/mL Enterococcus faecium    Culture - Urine (collected 01-12-19 @ 02:00)  Source: .Urine Clean Catch (Midstream)  Final Report (01-13-19 @ 22:41):    No growth    Culture - Blood (collected 01-11-19 @ 21:14)  Source: .Blood Blood  Preliminary Report (01-13-19 @ 03:43):    No growth to date.    Culture - Urine (collected 01-09-19 @ 04:20)  Source: .Urine Clean Catch (Midstream)  Final Report (01-10-19 @ 10:52):    Culture grew 3 or more types of organisms which indicate    collection contamination; consider recollection only if clinically    indicated.            RADIOLOGY & ADDITIONAL TESTS:    ANTIBIOTICS:  meropenem  IVPB 1000 milliGRAM(s) IV Intermittent every 12 hours  meropenem  IVPB      vancomycin  IVPB 750 milliGRAM(s) IV Intermittent daily

## 2019-01-15 NOTE — CONSULT NOTE ADULT - ASSESSMENT
IMPRESSION:  Acute sigmoid diverticulitis with no abscess and no ongoing peritonitis  Asymptomatic bacteruria  Acute gouty arthritis fet bilaterally    RECOMMENDATIONS:  Po Flagyl 500 mg q12h  Po Cipro 250 mg q12h'Duration 14 days  Recall prn please

## 2019-01-15 NOTE — CONSULT NOTE ADULT - SUBJECTIVE AND OBJECTIVE BOX
HPI:  83 yr old female with pmhx of HTN, CKD 4, recent DVT/PE (thought provoked by travel, completed 6mo a/c and no longer on Eliquis) CHF s/p PPM, sigmoid diverticulitis treated with antibiotics p/w lower abdominal pain x 1 D duration. Acute, mild to moderate, constant, dull, suprapubic and left lower abdominal a/w burning urination but no fever,  diarrhoea or constipation.     She was admitted in hospital in november 2018 for CHF exacerbation and course was complicated by Sigmoid diverticulitis and she was treated with antibiotics. (09 Jan 2019 12:16)      PAST MEDICAL & SURGICAL HISTORY:  Diverticulitis: sigmoid  Gout  Hypertension  Pacemaker  Chronic kidney disease  Artificial pacemaker      Hospital Course:    TODAY'S SUBJECTIVE & REVIEW OF SYMPTOMS:     Constitutional WNL   Cardio WNL   Resp WNL   GI WNL  Heme WNL  Endo WNL  Skin WNL  MSK weakness  Neuro WNL  Cognitive WNL  Psych WNL      MEDICATIONS  (STANDING):  ALBUTerol/ipratropium for Nebulization 3 milliLiter(s) Nebulizer every 4 hours  atorvastatin 40 milliGRAM(s) Oral at bedtime  calcitriol   Capsule 0.25 MICROGram(s) Oral daily  folic acid 1 milliGRAM(s) Oral daily  furosemide    Tablet 40 milliGRAM(s) Oral daily  heparin  Injectable 5000 Unit(s) SubCutaneous every 8 hours  latanoprost 0.005% Ophthalmic Solution 1 Drop(s) Both EYES at bedtime  meropenem  IVPB 1000 milliGRAM(s) IV Intermittent every 12 hours  meropenem  IVPB      methylPREDNISolone sodium succinate Injectable 60 milliGRAM(s) IV Push once  metoprolol tartrate 50 milliGRAM(s) Oral two times a day  pantoprazole    Tablet 40 milliGRAM(s) Oral before breakfast  sevelamer hydrochloride 800 milliGRAM(s) Oral three times a day with meals  vancomycin  IVPB 750 milliGRAM(s) IV Intermittent daily    MEDICATIONS  (PRN):  ALBUTerol    90 MICROgram(s) HFA Inhaler 2 Puff(s) Inhalation every 4 hours PRN Shortness of Breath  guaiFENesin    Syrup 100 milliGRAM(s) Oral every 6 hours PRN Cough  LORazepam     Tablet 0.5 milliGRAM(s) Oral daily PRN Anxiety      FAMILY HISTORY:  No pertinent family history in first degree relatives      Allergies    Keflex (Unknown)    Intolerances        SOCIAL HISTORY:    [  ] Etoh  [  ] Smoking  [  ] Substance abuse     Home Environment:  [  ] Home Alone  [x  ] Lives with Family  [  ] Home Health Aid    Dwelling:  [  ] Apartment  [ x ] Private House  [  ] Adult Home  [  ] Skilled Nursing Facility      [  ] Short Term  [  ] Long Term  [x  ] Stairs       Elevator [  ]    FUNCTIONAL STATUS PTA: (Check all that apply)  Ambulation: [ x  ]Independent    [  ] Dependent     [  ] Non-Ambulatory  Assistive Device: [ x ] SA Cane  [  ]  Q Cane  [x  ] Walker  [  ]  Wheelchair  ADL : [  ] Independent  [ x ]  Dependent       Vital Signs Last 24 Hrs  T(C): 36.8 (15 Navin 2019 05:22), Max: 37 (14 Jan 2019 21:03)  T(F): 98.3 (15 Navin 2019 05:22), Max: 98.6 (14 Jan 2019 21:03)  HR: 98 (15 Navin 2019 05:22) (71 - 98)  BP: 136/77 (15 Navin 2019 05:22) (128/70 - 159/74)  BP(mean): --  RR: 18 (15 Navin 2019 05:22) (18 - 18)  SpO2: 96% (15 Navin 2019 05:22) (96% - 98%)      PHYSICAL EXAM: Alert & Oriented X3  GENERAL: NAD, well-groomed, well-developed  HEAD:  Atraumatic, Normocephalic  CHEST/LUNG: Clear   HEART: S1S2+  ABDOMEN: Soft, Nontender  EXTREMITIES:  no calf tenderness    NERVOUS SYSTEM:  Cranial Nerves 2-12 intact [  ] Abnormal  [  ]  ROM: WFL all extremities [x  ]  Abnormal [  ]  Motor Strength: WFL all extremities  [  ]  Abnormal [x  ]4/5 all ext  Sensation: intact to light touch [ x ] Abnormal [  ]  Reflexes: Symmetric [  ]  Abnormal [  ]    FUNCTIONAL STATUS:  Bed Mobility: Independent [  ]  Supervision [  ]  Needs Assistance [ x ]  N/A [  ]  Transfers: Independent [  ]  Supervision [  ]  Needs Assistance [x  ]  N/A [  ]   Ambulation: Independent [  ]  Supervision [  ]  Needs Assistance [  ]  N/A [  ]  ADL: Independent [  ] Requires Assistance [  ] N/A [  ]      LABS:                        9.1    11.46 )-----------( 317      ( 15 Navin 2019 08:18 )             29.3     01-14    136  |  98  |  24<H>  ----------------------------<  89  4.0   |  24  |  1.7<H>    Ca    8.1<L>      14 Jan 2019 06:18    TPro  5.5<L>  /  Alb  2.7<L>  /  TBili  0.2  /  DBili  x   /  AST  15  /  ALT  10  /  AlkPhos  63  01-14          RADIOLOGY & ADDITIONAL STUDIES:    Assesment:

## 2019-01-15 NOTE — PROGRESS NOTE ADULT - SUBJECTIVE AND OBJECTIVE BOX
LENGTH OF HOSPITAL STAY: 6d    CHIEF COMPLAINT:   Patient is a 83y old  Female who presents with a chief complaint of lower abdominal pain (15 Navin 2019 07:41)      HISTORY OF PRESENTING ILLNESS:    HPI:  83 yr old female with pmhx of HTN, CKD 4, recent DVT/PE (thought provoked by travel, completed 6mo a/c and no longer on Eliquis) CHF s/p PPM, sigmoid diverticulitis treated with antibiotics p/w lower abdominal pain x 1 D duration. Acute, mild to moderate, constant, dull, suprapubic and left lower abdominal a/w burning urination but no fever,  diarrhoea or constipation.     She was admitted in hospital in november 2018 for CHF exacerbation and course was complicated by Sigmoid diverticulitis and she was treated with antibiotics. (09 Jan 2019 12:16)    Patient states that she feels better with respect to her sob but has been having generalized joint pain.  Seen by Dr. Mckinney yesterday and agree with assessment.      PAST MEDICAL & SURGICAL HISTORY  PAST MEDICAL & SURGICAL HISTORY:  Diverticulitis: sigmoid  Gout  Hypertension  Pacemaker  Chronic kidney disease  Artificial pacemaker    SOCIAL HISTORY:    ALLERGIES:  Keflex (Unknown)    MEDICATIONS:  STANDING MEDICATIONS  ALBUTerol/ipratropium for Nebulization 3 milliLiter(s) Nebulizer every 4 hours  atorvastatin 40 milliGRAM(s) Oral at bedtime  calcitriol   Capsule 0.25 MICROGram(s) Oral daily  folic acid 1 milliGRAM(s) Oral daily  furosemide    Tablet 40 milliGRAM(s) Oral daily  heparin  Injectable 5000 Unit(s) SubCutaneous every 8 hours  latanoprost 0.005% Ophthalmic Solution 1 Drop(s) Both EYES at bedtime  meropenem  IVPB 1000 milliGRAM(s) IV Intermittent every 12 hours  meropenem  IVPB      metoprolol tartrate 50 milliGRAM(s) Oral two times a day  pantoprazole    Tablet 40 milliGRAM(s) Oral before breakfast  sevelamer hydrochloride 800 milliGRAM(s) Oral three times a day with meals  vancomycin  IVPB 750 milliGRAM(s) IV Intermittent daily    PRN MEDICATIONS  ALBUTerol    90 MICROgram(s) HFA Inhaler 2 Puff(s) Inhalation every 4 hours PRN  guaiFENesin    Syrup 100 milliGRAM(s) Oral every 6 hours PRN  LORazepam     Tablet 0.5 milliGRAM(s) Oral daily PRN    VITALS:   T(F): 98.3  HR: 98  BP: 136/77  RR: 18  SpO2: 96%    LABS:                        8.6    8.45  )-----------( 278      ( 14 Jan 2019 06:18 )             27.6     01-14    136  |  98  |  24<H>  ----------------------------<  89  4.0   |  24  |  1.7<H>    Ca    8.1<L>      14 Jan 2019 06:18    TPro  5.5<L>  /  Alb  2.7<L>  /  TBili  0.2  /  DBili  x   /  AST  15  /  ALT  10  /  AlkPhos  63  01-14              Culture - Urine (collected 12 Jan 2019 22:42)  Source: .Urine Clean Catch (Midstream)  Preliminary Report (14 Jan 2019 13:20):    10,000 - 49,000 CFU/mL Enterococcus faecium      CARDIAC MARKERS ( 13 Jan 2019 12:39 )  x     / 0.02 ng/mL / x     / x     / x          RADIOLOGY:    PHYSICAL EXAM:  GEN: No acute distress  HEENT:   LUNGS: Clear to auscultation bilaterally   HEART: S1/S2 present. RRR.   ABD: Soft, non-tender, non-distended. Bowel sounds present  EXT: no edema or tenderness  NEURO: AAOX3

## 2019-01-15 NOTE — CONSULT NOTE ADULT - ATTENDING COMMENTS
Pt seen examined  Agree with assessment and plan above.  Pt with CKD stage 3/4, HTN, anemia , jehova's witness presents with diverticulitis.  OMERO resolved - creat stable at around baseline  d/c IVF, po intake is good    please monitor Vanco trough level     Anemia - needs iron studies  - will need ELIJAH most likely    OP f/u with Dr polanco
The patient is well known to me. The patient  has a nonischemic CM , multiple cardiac caths . No significant CAD , , EF improved with CRT . The patient had a PE in 4018 but that was after a long plane flight.  She is intolerant to ACE And Hydralazine, The patient had SOB wheezing N/V . She developed CP after vomiting.  Troponin is 0.02 which is unchanged from previous admission . Unlikely ACS . Unlikely but cannot R/O PE . In view of history will get V/Q scan. If negative , no further work up . Discussed with cardiac fellow .

## 2019-01-15 NOTE — PROGRESS NOTE ADULT - ASSESSMENT
83 yr old female with pmhx of HTN, CKD 4, recent DVT/PE (thought provoked by travel, completed 6mo a/c and no longer on Eliquis) CHF s/p PPM, sigmoid diverticulitis treated with antibiotics p/w lower abdominal pain x 1 D duration. CTAP revealed sigmoid diverticulitis.  .              Patient will need to get PT consult for anticipated d/c in am.  Will need home PT as well.

## 2019-01-15 NOTE — PROGRESS NOTE ADULT - SUBJECTIVE AND OBJECTIVE BOX
SUBJECTIVE:    Patient is a 83y old Female who presents with a chief complaint of lower abdominal pain (15 Navin 2019 09:13)    Currently admitted to medicine with the primary diagnosis of Diverticulitis     Today is hospital day 6d. Overnight no event. Report mild lower abd discomfort. Report b/l big toe gout flare. Denied CP, SOB, urinary freq/ urinary burning, diarrhea/constipation.    PAST MEDICAL & SURGICAL HISTORY  Diverticulitis: sigmoid  Gout  Hypertension  Pacemaker  Chronic kidney disease  Artificial pacemaker        ALLERGIES:  Keflex (Unknown)    MEDICATIONS:  STANDING MEDICATIONS  ALBUTerol/ipratropium for Nebulization 3 milliLiter(s) Nebulizer every 4 hours  atorvastatin 40 milliGRAM(s) Oral at bedtime  calcitriol   Capsule 0.25 MICROGram(s) Oral daily  folic acid 1 milliGRAM(s) Oral daily  furosemide    Tablet 40 milliGRAM(s) Oral daily  heparin  Injectable 5000 Unit(s) SubCutaneous every 8 hours  latanoprost 0.005% Ophthalmic Solution 1 Drop(s) Both EYES at bedtime  meropenem  IVPB 1000 milliGRAM(s) IV Intermittent every 12 hours  meropenem  IVPB      metoprolol tartrate 50 milliGRAM(s) Oral two times a day  pantoprazole    Tablet 40 milliGRAM(s) Oral before breakfast  sevelamer hydrochloride 800 milliGRAM(s) Oral three times a day with meals  vancomycin  IVPB 750 milliGRAM(s) IV Intermittent daily    PRN MEDICATIONS  ALBUTerol    90 MICROgram(s) HFA Inhaler 2 Puff(s) Inhalation every 4 hours PRN  guaiFENesin    Syrup 100 milliGRAM(s) Oral every 6 hours PRN  LORazepam     Tablet 0.5 milliGRAM(s) Oral daily PRN    VITALS:   T(F): 98.5  HR: 91  BP: 127/69  RR: 18  SpO2: 99%    LABS:                        9.1    11.46 )-----------( 317      ( 15 Navin 2019 08:18 )             29.3     01-15    134<L>  |  97<L>  |  22<H>  ----------------------------<  82  4.1   |  23  |  1.7<H>    Ca    8.5      15 Navin 2019 08:18  Mg     1.4     01-15    TPro  6.1  /  Alb  3.1<L>  /  TBili  <0.2  /  DBili  x   /  AST  17  /  ALT  10  /  AlkPhos  75  01-15              Culture - Urine (collected 12 Jan 2019 22:42)  Source: .Urine Clean Catch (Midstream)  Final Report (15 Navin 2019 08:37):    10,000 - 49,000 CFU/mL Enterococcus faecium (vancomycin resistant)  Organism: Enterococcus faecium (vancomycin resistant) (15 Navin 2019 08:37)  Organism: Enterococcus faecium (vancomycin resistant) (15 Navin 2019 08:37)      Culture - Urine (collected 12 Jan 2019 22:42)  Source: .Urine Clean Catch (Midstream)  Final Report (15 Navin 2019 08:37):    10,000 - 49,000 CFU/mL Enterococcus faecium (vancomycin resistant)  Organism: Enterococcus faecium (vancomycin resistant) (15 Navin 2019 08:37)  Organism: Enterococcus faecium (vancomycin resistant) (15 Navin 2019 08:37)    Culture - Urine (collected 12 Jan 2019 02:00)  Source: .Urine Clean Catch (Midstream)  Final Report (13 Jan 2019 22:41):    No growth    Culture - Blood (collected 11 Jan 2019 21:14)  Source: .Blood Blood  Preliminary Report (13 Jan 2019 03:43):    No growth to date.    Culture - Urine (collected 09 Jan 2019 04:20)  Source: .Urine Clean Catch (Midstream)  Final Report (10 Navin 2019 10:52):    Culture grew 3 or more types of organisms which indicate    collection contamination; consider recollection only if clinically    indicated.          RADIOLOGY:    PHYSICAL EXAM:  General: Not in distress.   HEENT: Moist mucus membranes. PERRLA.  Cardio: Regular rate and rhythm, S1, S2,   Pulm: mild b/l crackles  Abdomen: Soft, non-tender including suprapubic, non-distended.   Extremities: No cyanosis or edema bilaterally. b/l big toes tender to palpation, not red nor swollen.  Neuro: A&O x3. No focal deficits.

## 2019-01-15 NOTE — CONSULT NOTE ADULT - SUBJECTIVE AND OBJECTIVE BOX
NEPHROLOGY CONSULTATION NOTE    83 yr old female with pmhx of HTN, CKD 4, gout recent DVT/PE (thought provoked by travel, completed 6mo a/c and no longer on Eliquis) CHF s/p PPM, recent sigmoid diverticulitis in November initially presented to the ED with lower abdominal pain and dysuria x1 D. Patient is admitted for treatment of acute sigmoid diverticulitis, enterococcus UTI, and acute on chronic CHF exacerbation.     Nephrology was consulted for possible OMERO on CKD. Patient's creatnine on admission was found to be 1.9 elevated from baseline of 1.5 with CKD 4. Per the patient, she normally sees her nephrologist, Dr. Urrutia every 3 months. Prior to admission patient admits that she was not drinking and eating much due to her lower abdominal pain. She was taking Lasix at home. Patient is currently making adequate urine output.         PAST MEDICAL & SURGICAL HISTORY:  Diverticulitis: sigmoid  Gout  Hypertension  Pacemaker  Chronic kidney disease  Artificial pacemaker    Allergies:  Keflex (Unknown)    Home Medications Reviewed  Lasix 40 mg  Hospital Medications:   MEDICATIONS  (STANDING):  ALBUTerol/ipratropium for Nebulization 3 milliLiter(s) Nebulizer every 4 hours  atorvastatin 40 milliGRAM(s) Oral at bedtime  calcitriol   Capsule 0.25 MICROGram(s) Oral daily  folic acid 1 milliGRAM(s) Oral daily  furosemide    Tablet 40 milliGRAM(s) Oral daily  heparin  Injectable 5000 Unit(s) SubCutaneous every 8 hours  latanoprost 0.005% Ophthalmic Solution 1 Drop(s) Both EYES at bedtime  meropenem  IVPB 1000 milliGRAM(s) IV Intermittent every 12 hours  meropenem  IVPB      methylPREDNISolone sodium succinate Injectable 60 milliGRAM(s) IV Push once  metoprolol tartrate 50 milliGRAM(s) Oral two times a day  pantoprazole    Tablet 40 milliGRAM(s) Oral before breakfast  sevelamer hydrochloride 800 milliGRAM(s) Oral three times a day with meals  vancomycin  IVPB 750 milliGRAM(s) IV Intermittent daily      SOCIAL HISTORY:  Denies ETOH,Smoking,   FAMILY HISTORY:  No pertinent family history in first degree relatives      REVIEW OF SYSTEMS:  CONSTITUTIONAL: + weakness, fevers   EYES/ENT: No visual changes;  No vertigo or throat pain   NECK: No pain or stiffness  RESPIRATORY: No cough, + improving SOB  CARDIOVASCULAR: No chest pain or palpitations.  GASTROINTESTINAL: + abdominal. + vomiting, No diarrhea or constipation. No melena or hematochezia.  GENITOURINARY: + dysuria, frequency,  NEUROLOGICAL: No numbness or weakness  MSK: Gouty toe pain  SKIN: No itching, burning, rashes, or lesions   VASCULAR: No bilateral lower extremity edema.   All other review of systems is negative unless indicated above.    VITALS:  T(F): 98.3 (01-15-19 @ 05:22), Max: 98.6 (19 @ 21:03)  HR: 98 (01-15-19 @ 05:22)  BP: 136/77 (01-15-19 @ 05:22)  RR: 18 (01-15-19 @ 05:22)  SpO2: 96% (01-15-19 @ 05:22)     @ 07:  -  01-15 @ 07:00  --------------------------------------------------------  IN: 0 mL / OUT: 1350 mL / NET: -1350 mL    I&O's Detail    2019 07:01  -  15 Navin 2019 07:00  --------------------------------------------------------  IN:  Total IN: 0 mL    OUT:    Voided: 1350 mL  Total OUT: 1350 mL    Total NET: -1350 mL      PHYSICAL EXAM:  Constitutional: NAD  HEENT: anicteric sclera, oropharynx clear, MMM  Neck: No JVD  Respiratory: CTAB, no wheezes, rales or rhonchi  Cardiovascular: S1, S2, RRR  Gastrointestinal: BS+, soft, mild tenderness in LLQ  Extremities: No cyanosis or clubbing. No peripheral edema  Neurological: A/O x 3, no focal deficits  Psychiatric: Normal mood, normal affect  : No CVA tenderness.   Skin: ecchymosis over left venepuncture site   Vascular Access:    LABS:      136  |  98  |  24<H>  ----------------------------<  89  4.0   |  24  |  1.7<H>    Ca    8.1<L>      2019 06:18    TPro  5.5<L>  /  Alb  2.7<L>  /  TBili  0.2  /  DBili      /  AST  15  /  ALT  10  /  AlkPhos  63  01-14    Creatinine Trend: 1.7 <--, 1.9 <--, 1.9 <--, 1.8 <--, 1.6 <--, 1.7 <--, 1.9 <--                        8.6    8.45  )-----------( 278      ( 2019 06:18 )             27.6     Urine Studies:  Urinalysis Basic - ( 2019 02:00 )    Urine culture:   10,000 - 49,000 CFU/mL Enterococcus faecium (vancomycin resistant)    Color: Yellow / Appearance: Clear / S.010 / pH:   Gluc:  / Ketone: Negative  / Bili: Negative / Urobili: 0.2 mg/dL   Blood:  / Protein: 30 mg/dL / Nitrite: Negative   Leuk Esterase: Negative / RBC:  / WBC    Sq Epi:  / Non Sq Epi: Few /HPF / Bacteria: Few /HPF      RADIOLOGY & ADDITIONAL STUDIES:    CT ABD & Pelvis 19    IMPRESSION:  Acute sigmoid diverticulitis. No abscess.    KIDNEYS: No hydronephrosis or nephrolithiasis bilaterally. NEPHROLOGY CONSULTATION NOTE    83 yr old female with pmhx of HTN, CKD 4, gout recent DVT/PE (thought provoked by travel, completed 6mo a/c and no longer on Eliquis) CHF s/p PPM, recent sigmoid diverticulitis in November initially presented to the ED with lower abdominal pain and dysuria x1 D. Patient is admitted for treatment of acute sigmoid diverticulitis, enterococcus UTI, and acute on chronic CHF exacerbation.     Nephrology was consulted for possible OMERO on CKD. Patient's creatnine on admission was found to be 1.9 elevated from baseline of 1.5 with CKD 4. Per the patient, she normally sees her nephrologist, Dr. Urrutia every 3 months. Prior to admission patient admits that she was not drinking and eating much due to her lower abdominal pain. She was taking Lasix at home. Patient is currently making adequate urine output.         PAST MEDICAL & SURGICAL HISTORY:  Diverticulitis: sigmoid  Gout  Hypertension  Pacemaker  Chronic kidney disease  Artificial pacemaker    Allergies:  Keflex (Unknown)    Home Medications Reviewed  Lasix 40 mg  Hospital Medications:   MEDICATIONS  (STANDING):  ALBUTerol/ipratropium for Nebulization 3 milliLiter(s) Nebulizer every 4 hours  atorvastatin 40 milliGRAM(s) Oral at bedtime  calcitriol   Capsule 0.25 MICROGram(s) Oral daily  folic acid 1 milliGRAM(s) Oral daily  furosemide    Tablet 40 milliGRAM(s) Oral daily  heparin  Injectable 5000 Unit(s) SubCutaneous every 8 hours  latanoprost 0.005% Ophthalmic Solution 1 Drop(s) Both EYES at bedtime  meropenem  IVPB 1000 milliGRAM(s) IV Intermittent every 12 hours  meropenem  IVPB      methylPREDNISolone sodium succinate Injectable 60 milliGRAM(s) IV Push once  metoprolol tartrate 50 milliGRAM(s) Oral two times a day  pantoprazole    Tablet 40 milliGRAM(s) Oral before breakfast  sevelamer hydrochloride 800 milliGRAM(s) Oral three times a day with meals  vancomycin  IVPB 750 milliGRAM(s) IV Intermittent daily      SOCIAL HISTORY:  Denies ETOH,Smoking,   FAMILY HISTORY:  No pertinent family history in first degree relatives      REVIEW OF SYSTEMS:  CONSTITUTIONAL: + weakness, fevers   EYES/ENT: No visual changes;  No vertigo or throat pain   NECK: No pain or stiffness  RESPIRATORY: No cough, + improving SOB  CARDIOVASCULAR: No chest pain or palpitations.  GASTROINTESTINAL: + abdominal. + vomiting, No diarrhea or constipation. No melena or hematochezia.  GENITOURINARY: + dysuria, frequency,  NEUROLOGICAL: No numbness or weakness  MSK: Gouty toe pain  SKIN: No itching, burning, rashes, or lesions   VASCULAR: No bilateral lower extremity edema.   All other review of systems is negative unless indicated above.    VITALS:  T(F): 98.3 (01-15-19 @ 05:22), Max: 98.6 (19 @ 21:03)  HR: 98 (01-15-19 @ 05:22)  BP: 136/77 (01-15-19 @ 05:22)  RR: 18 (01-15-19 @ 05:22)  SpO2: 96% (01-15-19 @ 05:22)     @ 07:  -  01-15 @ 07:00  --------------------------------------------------------  IN: 0 mL / OUT: 1350 mL / NET: -1350 mL    I&O's Detail    2019 07:01  -  15 Navin 2019 07:00  --------------------------------------------------------  IN:  Total IN: 0 mL    OUT:    Voided: 1350 mL  Total OUT: 1350 mL    Total NET: -1350 mL      PHYSICAL EXAM:  Constitutional: NAD  HEENT: anicteric sclera, oropharynx clear, MMM  Neck: No JVD  Respiratory: CTAB, no wheezes, rales or rhonchi  Cardiovascular: S1, S2, RRR  Gastrointestinal: BS+, soft, mild tenderness in LLQ  Extremities: No cyanosis or clubbing. No peripheral edema  Neurological: A/O x 3, no focal deficits  Psychiatric: Normal mood, normal affect  : No CVA tenderness.   Skin: ecchymosis over left venepuncture site   Vascular Access:    LABS:      136  |  98  |  24<H>  ----------------------------<  89  4.0   |  24  |  1.7<H>    Ca    8.1<L>      2019 06:18    TPro  5.5<L>  /  Alb  2.7<L>  /  TBili  0.2  /  DBili      /  AST  15  /  ALT  10  /  AlkPhos  63  01-14    Creatinine Trend: 1.7 <--, 1.9 <--, 1.9 <--, 1.8 <--, 1.6 <--, 1.7 <--, 1.9 <--                        8.6    8.45  )-----------( 278      ( 2019 06:18 )             27.6     Urine Studies:  Urinalysis Basic - ( 2019 02:00 )    Urine culture:   10,000 - 49,000 CFU/mL Enterococcus faecium (vancomycin resistant)    Color: Yellow / Appearance: Clear / S.010 / pH:   Gluc:  / Ketone: Negative  / Bili: Negative / Urobili: 0.2 mg/dL   Blood:  / Protein: 30 mg/dL / Nitrite: Negative   Leuk Esterase: Negative / RBC:  / WBC    Sq Epi:  / Non Sq Epi: Few /HPF / Bacteria: Few /HPF      RADIOLOGY & ADDITIONAL STUDIES:    CT ABD & Pelvis 19    IMPRESSION:  Acute sigmoid diverticulitis. No abscess.    KIDNEYS: No hydronephrosis or nephrolithiasis bilaterally.    Impression:      Stable right pleural effusion.

## 2019-01-15 NOTE — CONSULT NOTE ADULT - ASSESSMENT
IMPRESSION: Rehab of gait dysfunction      PRECAUTIONS: [  ] Cardiac  [  ] Respiratory  [  ] Seizures [  ] Contact Isolation  [  ] Droplet Isolation  [  ] Other    Weight Bearing Status:     RECOMMENDATION:    Out of Bed to Chair     DVT/Decubiti Prophylaxis    REHAB PLAN:     [ x  ] Bedside P/T 3-5 times a week   [   ]   Bedside O/T  2-3 times a week             [   ] No Rehab Therapy Indicated                   [   ]  Speech Therapy   Conditioning/ROM                                    ADL  Bed Mobility                                               Conditioning/ROM  Transfers                                                     Bed Mobility  Sitting /Standing Balance                         Transfers                                        Gait Training                                               Sitting/Standing Balance  Stair Training [   ]Applicable                    Home equipment Eval                                                                        Splinting  [   ] Only      GOALS:   ADL   [   ]   Independent                    Transfers  [x   ] Independent                          Ambulation  [x   ] Independent     [ x   ] With device                            [   ]  CG                                                         [   ]  CG                                                                  [   ] CG                            [    ] Min A                                                   [   ] Min A                                                              [   ] Min  A          DISCHARGE PLAN:   [   ]  Good candidate for Intensive Rehabilitation/Hospital based                                             Will tolerate 3hrs Intensive Rehab Daily                                       [  x  ]  Short Term Rehab in Skilled Nursing Facility                         vs              [ x   ]  Home with Outpatient or VN services                                         [    ]  Possible Candidate for Intensive Hospital based Rehab

## 2019-01-15 NOTE — CONSULT NOTE ADULT - ASSESSMENT
84 yo F PMH HTN, CKD 4, recent DVT/PE , gout, CHF s/p PPM initially presented with acute lower abdominal pain and dysuria found to have acute sigmoid diverticulitis, UTI, and CHF exacerbation. Patient was also found to have elevated cr of 1.9 (baseline 1.5), improved to 1.7 with gentle hydration.     #OMERO on CKD 4 (improving)  Cr 1.9-->1.7  BUN/Cr: >20  OMERO likely prerenal secondary to reduced fluid intake with Lasix and CHF exacerbation  - Continue IVF   - Urine litesn (urine sodium, urea, creatnine)  - Continue PO Lasix for CHF  - Monitor Input/Output  - Renal vancomycin dosing: CrCl: 23 ml/min: 15 mg/kg x1, then continue usual dose  - Vancomycin trough fourth day 82 yo F PMH HTN, CKD 4, recent DVT/PE , gout, CHF s/p PPM initially presented with acute lower abdominal pain and dysuria found to have acute sigmoid diverticulitis, UTI, and CHF exacerbation. Patient was also found to have elevated cr of 1.9 (baseline 1.5), improved to 1.7 with gentle hydration.     #OMERO on CKD 4 (improving)  Cr 1.9-->1.7  BUN/Cr: >20  OMERO likely prerenal secondary to reduced fluid intake with Lasix and CHF exacerbation  - D/C IVF patient has adequate PO intake  - Urine lites (urine sodium, urea, creatnine)  - Continue PO Lasix for CHF  - Monitor Input/Output  - Iron studies prior to considering outpatient Epogen shots  - Renal vancomycin dosing: CrCl: 23 ml/min: 15 mg/kg x1, then continue usual dose  - Vancomycin trough fourth day (keep level 15-20)

## 2019-01-16 VITALS — OXYGEN SATURATION: 99 %

## 2019-01-16 LAB
ALBUMIN SERPL ELPH-MCNC: 3 G/DL — LOW (ref 3.5–5.2)
ALP SERPL-CCNC: 66 U/L — SIGNIFICANT CHANGE UP (ref 30–115)
ALT FLD-CCNC: 9 U/L — SIGNIFICANT CHANGE UP (ref 0–41)
ANION GAP SERPL CALC-SCNC: 16 MMOL/L — HIGH (ref 7–14)
ANION GAP SERPL CALC-SCNC: 18 MMOL/L — HIGH (ref 7–14)
AST SERPL-CCNC: 14 U/L — SIGNIFICANT CHANGE UP (ref 0–41)
BILIRUB SERPL-MCNC: <0.2 MG/DL — SIGNIFICANT CHANGE UP (ref 0.2–1.2)
BUN SERPL-MCNC: 30 MG/DL — HIGH (ref 10–20)
BUN SERPL-MCNC: 30 MG/DL — HIGH (ref 10–20)
CALCIUM SERPL-MCNC: 8.8 MG/DL — SIGNIFICANT CHANGE UP (ref 8.5–10.1)
CALCIUM SERPL-MCNC: 9.4 MG/DL — SIGNIFICANT CHANGE UP (ref 8.5–10.1)
CHLORIDE SERPL-SCNC: 97 MMOL/L — LOW (ref 98–110)
CHLORIDE SERPL-SCNC: 97 MMOL/L — LOW (ref 98–110)
CO2 SERPL-SCNC: 18 MMOL/L — SIGNIFICANT CHANGE UP (ref 17–32)
CO2 SERPL-SCNC: 21 MMOL/L — SIGNIFICANT CHANGE UP (ref 17–32)
CREAT ?TM UR-MCNC: 99 MG/DL — SIGNIFICANT CHANGE UP
CREAT SERPL-MCNC: 1.5 MG/DL — SIGNIFICANT CHANGE UP (ref 0.7–1.5)
CREAT SERPL-MCNC: 1.7 MG/DL — HIGH (ref 0.7–1.5)
FERRITIN SERPL-MCNC: 334 NG/ML — HIGH (ref 15–150)
GLUCOSE SERPL-MCNC: 127 MG/DL — HIGH (ref 70–99)
GLUCOSE SERPL-MCNC: 140 MG/DL — HIGH (ref 70–99)
HAPTOGLOB SERPL-MCNC: 505 MG/DL — HIGH (ref 34–200)
HCT VFR BLD CALC: 26.9 % — LOW (ref 37–47)
HGB BLD-MCNC: 8.6 G/DL — LOW (ref 12–16)
MAGNESIUM SERPL-MCNC: 2 MG/DL — SIGNIFICANT CHANGE UP (ref 1.8–2.4)
MCHC RBC-ENTMCNC: 28.9 PG — SIGNIFICANT CHANGE UP (ref 27–31)
MCHC RBC-ENTMCNC: 32 G/DL — SIGNIFICANT CHANGE UP (ref 32–37)
MCV RBC AUTO: 90.3 FL — SIGNIFICANT CHANGE UP (ref 81–99)
NRBC # BLD: 0 /100 WBCS — SIGNIFICANT CHANGE UP (ref 0–0)
OSMOLALITY UR: 337 MOS/KG — SIGNIFICANT CHANGE UP (ref 50–1400)
PLATELET # BLD AUTO: 293 K/UL — SIGNIFICANT CHANGE UP (ref 130–400)
POTASSIUM SERPL-MCNC: 5.4 MMOL/L — HIGH (ref 3.5–5)
POTASSIUM SERPL-MCNC: 5.7 MMOL/L — HIGH (ref 3.5–5)
POTASSIUM SERPL-SCNC: 5.4 MMOL/L — HIGH (ref 3.5–5)
POTASSIUM SERPL-SCNC: 5.7 MMOL/L — HIGH (ref 3.5–5)
POTASSIUM UR-SCNC: 36 MMOL/L — SIGNIFICANT CHANGE UP
PROT SERPL-MCNC: 6 G/DL — SIGNIFICANT CHANGE UP (ref 6–8)
RBC # BLD: 2.98 M/UL — LOW (ref 4.2–5.4)
RBC # FLD: 14.9 % — HIGH (ref 11.5–14.5)
SODIUM SERPL-SCNC: 133 MMOL/L — LOW (ref 135–146)
SODIUM SERPL-SCNC: 134 MMOL/L — LOW (ref 135–146)
SODIUM UR-SCNC: 25 MMOL/L — SIGNIFICANT CHANGE UP
URATE UR-MCNC: 43 MG/DL — SIGNIFICANT CHANGE UP
WBC # BLD: 13.15 K/UL — HIGH (ref 4.8–10.8)
WBC # FLD AUTO: 13.15 K/UL — HIGH (ref 4.8–10.8)

## 2019-01-16 RX ORDER — DOCUSATE SODIUM 100 MG
100 CAPSULE ORAL THREE TIMES A DAY
Qty: 0 | Refills: 0 | Status: DISCONTINUED | OUTPATIENT
Start: 2019-01-16 | End: 2019-01-16

## 2019-01-16 RX ORDER — PANTOPRAZOLE SODIUM 20 MG/1
1 TABLET, DELAYED RELEASE ORAL
Qty: 15 | Refills: 0
Start: 2019-01-16 | End: 2019-01-30

## 2019-01-16 RX ORDER — SENNA PLUS 8.6 MG/1
2 TABLET ORAL
Qty: 60 | Refills: 0
Start: 2019-01-16 | End: 2019-02-14

## 2019-01-16 RX ORDER — FUROSEMIDE 40 MG
1 TABLET ORAL
Qty: 0 | Refills: 0 | COMMUNITY

## 2019-01-16 RX ORDER — SENNA PLUS 8.6 MG/1
2 TABLET ORAL AT BEDTIME
Qty: 0 | Refills: 0 | Status: DISCONTINUED | OUTPATIENT
Start: 2019-01-16 | End: 2019-01-16

## 2019-01-16 RX ORDER — DOCUSATE SODIUM 100 MG
1 CAPSULE ORAL
Qty: 90 | Refills: 0
Start: 2019-01-16 | End: 2019-02-14

## 2019-01-16 RX ORDER — CIPROFLOXACIN LACTATE 400MG/40ML
1 VIAL (ML) INTRAVENOUS
Qty: 26 | Refills: 0
Start: 2019-01-16 | End: 2019-01-28

## 2019-01-16 RX ORDER — ALBUTEROL 90 UG/1
2 AEROSOL, METERED ORAL
Qty: 1 | Refills: 0
Start: 2019-01-16 | End: 2019-02-14

## 2019-01-16 RX ORDER — METRONIDAZOLE 500 MG
1 TABLET ORAL
Qty: 26 | Refills: 0
Start: 2019-01-16 | End: 2019-01-28

## 2019-01-16 RX ORDER — FUROSEMIDE 40 MG
1 TABLET ORAL
Qty: 0 | Refills: 0 | DISCHARGE
Start: 2019-01-16

## 2019-01-16 RX ADMIN — Medication 250 MILLIGRAM(S): at 05:30

## 2019-01-16 RX ADMIN — CALCITRIOL 0.25 MICROGRAM(S): 0.5 CAPSULE ORAL at 12:44

## 2019-01-16 RX ADMIN — PANTOPRAZOLE SODIUM 40 MILLIGRAM(S): 20 TABLET, DELAYED RELEASE ORAL at 08:24

## 2019-01-16 RX ADMIN — Medication 3 MILLILITER(S): at 08:33

## 2019-01-16 RX ADMIN — Medication 50 MILLIGRAM(S): at 05:30

## 2019-01-16 RX ADMIN — Medication 3 MILLILITER(S): at 13:44

## 2019-01-16 RX ADMIN — Medication 20 MILLIGRAM(S): at 05:30

## 2019-01-16 RX ADMIN — Medication 500 MILLIGRAM(S): at 05:30

## 2019-01-16 RX ADMIN — Medication 40 MILLIGRAM(S): at 05:30

## 2019-01-16 RX ADMIN — Medication 1 MILLIGRAM(S): at 12:44

## 2019-01-16 RX ADMIN — SEVELAMER CARBONATE 800 MILLIGRAM(S): 2400 POWDER, FOR SUSPENSION ORAL at 12:44

## 2019-01-16 RX ADMIN — SEVELAMER CARBONATE 800 MILLIGRAM(S): 2400 POWDER, FOR SUSPENSION ORAL at 08:24

## 2019-01-16 RX ADMIN — HEPARIN SODIUM 5000 UNIT(S): 5000 INJECTION INTRAVENOUS; SUBCUTANEOUS at 05:30

## 2019-01-16 NOTE — PROGRESS NOTE ADULT - ASSESSMENT
84 yo F PMH HTN, CKD 4, recent DVT/PE (thought provoked by travel, completed 6mo a/c and no longer on Eliquis) CHF s/p PPM, sigmoid diverticulitis treated with antibiotics p/w lower abdominal pain x 1 D duration. CTAP revealed sigmoid diverticulitis.    # Acute sigmoid diverticulitis- improving  - ID - Po Flagyl 500 mg q12h, Po Cipro 250 mg q12h Duration 14 days  - d/c vanco and pratik (due to rapid response 1/11 with T 102.5 and leukocytosis)  - was on cipro and flagyl IV before   - Pt  tolerating soft diet. Advance to regular    #Asymptomatic bacteruria  - UA 1/12 neg. UCx 1/12 neg. Another UCx 1/12 10-49k VRE likely contaminant.  -Per ID, no need for tx      #Acute on chronic diastolic heart failure- improving  -99% RA  - Vascular congestion on CXR, crackles on physical exam, BNP 30,198  -Cardio recs abi.  - C/w po lasix 40. Lasix restarted 1/11 (rapid response for SOB due to CHF exacerbation). Home lasix was stopped on admission given slightly worsening kidney function.  - Cr stable 1.7. Monitor  - 2-D echo 11/5: LVEF 55-60%. GIIDD. Mild pulm HTN. LVH    #Atypical chest discomfort, SOB, wheezing- resolved on nebulizers     ACS very unlikely  Cardio recs abi- trop stable 0.02, mildly positive troponin  in setting of dehydration, infection, CKD    hx of PE, V/Q scan 1/11- low prob PE      # OMERO on CKD 4- improving  Nephro recs abi  - Cr baseline is 1.5.   - was likely pre-renal; improved on IVF, but pt volume overloaded and restarted lasix.  -Pending urine sodium, urea, cr      # Chronic normocytic anemia- stable  - baseline hb 9-10.  9.1 today  - not taking iron pills d/t insurance issues.   - Nephro- considering outpatient Epogen shots (iron normal, TIBC low, waiting ferritin)    # Gout flare- not improving  -  Another solumedrol 60 mg ivpb- 2nd dose. Already got pred 20 in AM  -oral prednisone 20 qd x 3 days tomorrow       #DVT  Eliquis d/cameron although patient has R soleal DVT due risk outweigh benefits of treatment.    #Hypomagnesemia     DVT ppx: Heparin 5000 q8h  Diet- regular fiber restrict, DASH/TLC.   Physiatry- SNF or home with VN.  Pending PT- pt have not ambulated with walker due to gout flare up. At home ambulates with walker. Might need SNF if can't ambulate. 84 yo F PMH HTN, CKD 4, recent DVT/PE (thought provoked by travel, completed 6mo a/c and no longer on Eliquis) CHF s/p PPM, sigmoid diverticulitis treated with antibiotics p/w lower abdominal pain x 1 D duration. CTAP revealed sigmoid diverticulitis. Hospitalization complicated by CHF exacerbation due to lasix held for OMERO, which resolved after lasix restarted. OMERO resolved and diverticulitis improved.    # Acute sigmoid diverticulitis- improving  - ID - Po Flagyl 500 mg q12h, Po Cipro 250 mg q12h Duration 14 days (end 1/29)  - d/c-ed vanco and pratik (due to rapid response 1/11 with T 102.5 and leukocytosis)  - was on cipro and flagyl IV before   - Tolerating regular diet    #Asymptomatic bacteruria  - UA 1/12 neg. UCx 1/12 neg. Another UCx 1/12 10-49k VRE likely contaminant.  -Per ID, no need for tx      #Acute on chronic diastolic heart failure- improving  -99% RA  - Vascular congestion on CXR,  BNP 30,198  -Cardio recs abi.  - C/w po lasix 40. Lasix restarted 1/11 (rapid response for SOB due to CHF exacerbation). Home lasix was stopped on admission given slightly worsening kidney function.  - Cr stable 1.7. Monitor  - 2-D echo 11/5: LVEF 55-60%. GIIDD. Mild pulm HTN. LVH    #Atypical chest discomfort, SOB, wheezing- resolved on nebulizers     ACS very unlikely  Cardio recs abi- trop stable 0.02, mildly positive troponin  in setting of dehydration, infection, CKD    hx of PE, V/Q scan 1/11- low prob PE      # OMERO on CKD 4- improved  Nephro recs abi  - Cr baseline is 1.5. Today 1.5   - was likely pre-renal; improved on IVF, but pt volume overloaded and restarted lasix.  -Pending urine sodium, urea, cr      # Chronic normocytic anemia- stable  - baseline hb 9-10.  8.6 today  - not taking iron pills d/t insurance issues.   - Nephro- considering outpatient Epogen shots (iron normal, TIBC low, waiting ferritin)    # Gout flare- not improving  -  Another solumedrol 60 mg ivpb- 2nd dose. Already got pred 20 in AM  -oral prednisone 20 qd x 3 days tomorrow       #DVT  Eliquis d/cameron although patient has R soleal DVT due risk outweigh benefits of treatment.    #Hypomagnesemia resolved     DVT ppx: Heparin 5000 q8h  Diet- regular fiber restrict, DASH/TLC.   Physiatry- SNF or home with VN.  Pending PT- pt have not ambulated with walker due to gout flare up. At home ambulates with walker. Might need SNF if can't ambulate. 82 yo F PMH HTN, CKD 4, recent DVT/PE (thought provoked by travel, completed 6mo a/c and no longer on Eliquis) CHF s/p PPM, sigmoid diverticulitis treated with antibiotics p/w lower abdominal pain x 1 D duration. CTAP revealed sigmoid diverticulitis. Hospitalization complicated by CHF exacerbation due to lasix held for OMERO, which resolved after lasix restarted. OMERO resolved and diverticulitis improved.  # Acute sigmoid diverticulitis- improving  - ID - Po Flagyl 500 mg q12h, Po Cipro 250 mg q12h Duration 14 days (end 1/29)  - d/c-ed vanco and pratik (due to rapid response 1/11 with T 102.5 and leukocytosis)  - was on cipro and flagyl IV before   - Tolerating regular diet    #Asymptomatic bacteruria  - UA 1/12 neg. UCx 1/12 neg. Another UCx 1/12 10-49k VRE likely contaminant.  -Per ID, no need for tx      #Acute on chronic diastolic heart failure- improving  -99% RA  - Vascular congestion on CXR,  BNP 30,198  -Cardio recs abi.  - C/w po lasix 40. Lasix restarted 1/11 (rapid response for SOB due to CHF exacerbation). Home lasix was stopped on admission given slightly worsening kidney function.  - Cr stable 1.5  - 2-D echo 11/5: LVEF 55-60%. GIIDD. Mild pulm HTN. LVH    #Atypical chest discomfort, SOB, wheezing- resolved on nebulizers     ACS very unlikely  Cardio recs abi- trop stable 0.02, mildly positive troponin  in setting of dehydration, infection, CKD    hx of PE, V/Q scan 1/11- low prob PE      # OMERO on CKD 4- improved  Nephro recs abi  - Cr baseline is 1.5. Today 1.5   - was likely pre-renal; improved on IVF, but pt volume overloaded and restarted lasix.      # Chronic normocytic anemia- stable  - baseline hb 9-10.  8.6 today  - not taking iron pills d/t insurance issues.   - Nephro- considering outpatient Epogen shots (iron normal, TIBC low, waiting ferritin)    # Gout flare- not improving  -  s/p solumedrol 60 mg ivpb once 1/15  -oral prednisone 20 qd x 3 days      #Hyperkalemia   K 5.4   Repeat BMP in 1 week oupt and low K diet     #DVT  Eliquis d/cameron although patient has R soleal DVT due risk outweigh benefits of treatment.    #DVT ppx- heparin 5000 subq q8h    #Hypomagnesemia resolved   PT saw pt- ambulated    D/c today        FINAL DISCHARGE INTERVIEW:  Resident(s) Present: (Name:__Thania Bolden___________), RN Present: (Name:  ___________)    DISCHARGE MEDICATION RECONCILIATION  reviewed with Attending (Name:___Dr. Celeste________)    DISPOSITION:   [  ] Home,    [ x ] Home with Visiting Nursing Services,   [    ]  SNF/ NH,    [   ] Acute Rehab (4A),   [   ] Other (Specify:_________) 82 yo F PMH HTN, CKD 4, recent DVT/PE (thought provoked by travel, completed 6mo a/c and no longer on Eliquis) CHF s/p PPM, sigmoid diverticulitis treated with antibiotics p/w lower abdominal pain x 1 D duration. CTAP revealed sigmoid diverticulitis. Hospitalization complicated by CHF exacerbation due to lasix held for OMERO, which resolved after lasix restarted. OMERO resolved and diverticulitis improved.  # Acute sigmoid diverticulitis- improving  - ID - Po Flagyl 500 mg q12h, Po Cipro 250 mg q12h Duration 14 days (end 1/29)  - d/c-ed vanco and pratik (due to rapid response 1/11 with T 102.5 and leukocytosis)  - was on cipro and flagyl IV before   - Tolerating regular diet    #Asymptomatic bacteruria  - UA 1/12 neg. UCx 1/12 neg. Another UCx 1/12 10-49k VRE likely contaminant.  -Per ID, no need for tx      #Acute on chronic diastolic heart failure- improving  -99% RA  - Vascular congestion on CXR,  BNP 30,198  -Cardio recs abi.  - C/w po lasix 40. Lasix restarted 1/11 (rapid response for SOB due to CHF exacerbation). Home lasix was stopped on admission given slightly worsening kidney function.  - Cr stable 1.5  - 2-D echo 11/5: LVEF 55-60%. GIIDD. Mild pulm HTN. LVH    #Atypical chest discomfort, SOB, wheezing- resolved on nebulizers     ACS very unlikely  Cardio recs abi- trop stable 0.02, mildly positive troponin  in setting of dehydration, infection, CKD    hx of PE, V/Q scan 1/11- low prob PE      # OMERO on CKD 4- improved  Nephro recs abi  - Cr baseline is 1.5. Today 1.5   - was likely pre-renal; improved on IVF, but pt volume overloaded and restarted lasix.      # Chronic normocytic anemia- stable  - baseline hb 9-10.  8.6 today  - not taking iron pills d/t insurance issues.   - Nephro- considering outpatient Epogen shots (iron normal, TIBC low, waiting ferritin)    # Gout flare- not improving  -  s/p solumedrol 60 mg ivpb once 1/15  -oral prednisone 20 qd x 3 days      #Hyperkalemia   K 5.4   Repeat BMP in 1 week oupt and low K diet     #DVT  Eliquis d/cameron although patient has R soleal DVT due risk outweigh benefits of treatment.    #DVT ppx- heparin 5000 subq q8h    #Hypomagnesemia resolved   PT saw pt- ambulated    D/c today        FINAL DISCHARGE INTERVIEW:  Resident(s) Present: (Name:__Thania Bolden___________), RN Present: (Name:  ___Kierra________)    DISCHARGE MEDICATION RECONCILIATION  reviewed with Attending (Name:___Dr. Celeste________)    DISPOSITION:   [  ] Home,    [ x ] Home with Visiting Nursing Services,   [    ]  SNF/ NH,    [   ] Acute Rehab (4A),   [   ] Other (Specify:_________)

## 2019-01-16 NOTE — PHYSICAL THERAPY INITIAL EVALUATION ADULT - ADDITIONAL COMMENTS
Patient has home attendant 8 hours/day, 5 days/week. Lives with children. Patient assisted by attendant in ADL's and home ambulation. As per patient she has walker and wheelchair at home.

## 2019-01-16 NOTE — PROGRESS NOTE ADULT - ASSESSMENT
83 yr old female with pmhx of HTN, CKD 4, recent DVT/PE (thought provoked by travel, completed 6mo a/c and no longer on Eliquis) CHF s/p PPM, sigmoid diverticulitis treated with antibiotics p/w lower abdominal pain x 1 D duration. CTAP revealed sigmoid diverticulitis.  .          Will give 1 additional dose of Solumedrol 60 mg ivpb    Patient will need to get PT consult for anticipated d/c in next 24 hrs

## 2019-01-16 NOTE — PROGRESS NOTE ADULT - REASON FOR ADMISSION
lower abdominal pain

## 2019-01-16 NOTE — PHYSICAL THERAPY INITIAL EVALUATION ADULT - IMPAIRMENTS FOUND, PT EVAL
gait, locomotion, and balance/gross motor/joint integrity and mobility/ROM/aerobic capacity/endurance/muscle strength

## 2019-01-16 NOTE — PROGRESS NOTE ADULT - SUBJECTIVE AND OBJECTIVE BOX
SUBJECTIVE:    Patient is a 83y old Female who presents with a chief complaint of lower abdominal pain (16 Jan 2019 08:09)    Currently admitted to medicine with the primary diagnosis of Diverticulitis     Today is hospital day 7d. Overnight no event. Pt report pain in b/l toes from gout flare is preventing her from walking due to pain. At home, uses walker. Report stable lower abd pain from diverticulitis. Denied urinary burning/freq. Report constipation 2 days. Denied CP, SOB.    PAST MEDICAL & SURGICAL HISTORY  Diverticulitis: sigmoid  Gout  Hypertension  Pacemaker  Chronic kidney disease  Artificial pacemaker        ALLERGIES:  Keflex (Unknown)    MEDICATIONS:  STANDING MEDICATIONS  ALBUTerol/ipratropium for Nebulization 3 milliLiter(s) Nebulizer every 4 hours  atorvastatin 40 milliGRAM(s) Oral at bedtime  calcitriol   Capsule 0.25 MICROGram(s) Oral daily  ciprofloxacin     Tablet 250 milliGRAM(s) Oral every 12 hours  docusate sodium 100 milliGRAM(s) Oral three times a day  folic acid 1 milliGRAM(s) Oral daily  furosemide    Tablet 40 milliGRAM(s) Oral daily  heparin  Injectable 5000 Unit(s) SubCutaneous every 8 hours  latanoprost 0.005% Ophthalmic Solution 1 Drop(s) Both EYES at bedtime  methylPREDNISolone sodium succinate Injectable 60 milliGRAM(s) IV Push once  metoprolol tartrate 50 milliGRAM(s) Oral two times a day  metroNIDAZOLE    Tablet 500 milliGRAM(s) Oral every 12 hours  pantoprazole    Tablet 40 milliGRAM(s) Oral before breakfast  predniSONE   Tablet 20 milliGRAM(s) Oral daily  senna 2 Tablet(s) Oral at bedtime  sevelamer hydrochloride 800 milliGRAM(s) Oral three times a day with meals    PRN MEDICATIONS  ALBUTerol    90 MICROgram(s) HFA Inhaler 2 Puff(s) Inhalation every 4 hours PRN  guaiFENesin    Syrup 100 milliGRAM(s) Oral every 6 hours PRN  LORazepam     Tablet 0.5 milliGRAM(s) Oral daily PRN    VITALS:   T(F): 96.9  HR: 80  BP: 145/73  RR: 18  SpO2: 99%    LABS:                        8.6    13.15 )-----------( 293      ( 16 Jan 2019 06:17 )             26.9     01-15    134<L>  |  97<L>  |  22<H>  ----------------------------<  82  4.1   |  23  |  1.7<H>    Ca    8.5      15 Navin 2019 08:18  Mg     1.4     01-15    TPro  6.1  /  Alb  3.1<L>  /  TBili  <0.2  /  DBili  x   /  AST  17  /  ALT  10  /  AlkPhos  75  01-15                  RADIOLOGY:    PHYSICAL EXAM:  General: Not in distress.   HEENT: Moist mucus membranes. PERRLA.  Cardio: Regular rate and rhythm, S1, S2,   Pulm: mild b/l crackles  Abdomen: Soft, lower abd below umbilicus mildly tender to palpation, non-distended.   Extremities: No cyanosis or edema bilaterally. b/l big toes tender to palpation, not red nor swollen.  Neuro: A&O x3. No focal deficits.

## 2019-01-16 NOTE — PROGRESS NOTE ADULT - SUBJECTIVE AND OBJECTIVE BOX
Nephrology progress note    Patient is seen and examined, events over the last 24 h noted . Creatinine is back to baseline. Iron studies pending.     Allergies:  Keflex (Unknown)    Hospital Medications:   MEDICATIONS  (STANDING):  ALBUTerol/ipratropium for Nebulization 3 milliLiter(s) Nebulizer every 4 hours  atorvastatin 40 milliGRAM(s) Oral at bedtime  calcitriol   Capsule 0.25 MICROGram(s) Oral daily  ciprofloxacin     Tablet 250 milliGRAM(s) Oral every 12 hours  docusate sodium 100 milliGRAM(s) Oral three times a day  folic acid 1 milliGRAM(s) Oral daily  furosemide    Tablet 40 milliGRAM(s) Oral daily  heparin  Injectable 5000 Unit(s) SubCutaneous every 8 hours  latanoprost 0.005% Ophthalmic Solution 1 Drop(s) Both EYES at bedtime  methylPREDNISolone sodium succinate Injectable 60 milliGRAM(s) IV Push once  metoprolol tartrate 50 milliGRAM(s) Oral two times a day  metroNIDAZOLE    Tablet 500 milliGRAM(s) Oral every 12 hours  pantoprazole    Tablet 40 milliGRAM(s) Oral before breakfast  predniSONE   Tablet 20 milliGRAM(s) Oral daily  senna 2 Tablet(s) Oral at bedtime  sevelamer hydrochloride 800 milliGRAM(s) Oral three times a day with meals        VITALS:  T(F): 96.9 (19 @ 04:10), Max: 98.5 (01-15-19 @ 13:52)  HR: 80 (19 @ 04:10)  BP: 145/73 (19 @ 04:10)  RR: 18 (19 @ 04:10)  SpO2: 99% (19 @ 07:40)  Wt(kg): --     @ 07:01  -  01-15 @ 07:00  --------------------------------------------------------  IN: 0 mL / OUT: 1350 mL / NET: -1350 mL    01-15 @ 07: @ 07:00  --------------------------------------------------------  IN: 480 mL / OUT: 400 mL / NET: 80 mL          PHYSICAL EXAM:  Constitutional: NAD  HEENT: anicteric sclera, oropharynx clear, MMM  Neck: No JVD  Respiratory: CTAB, no wheezes, rales or rhonchi  Cardiovascular: S1, S2, RRR  Gastrointestinal: BS+, soft, NT/ND  Extremities: No cyanosis or clubbing. No peripheral edema  Neurological: A/O x 3, no focal deficits  : No CVA tenderness. No carlos.   Skin: No rashes  Vascular Access:    LABS:      133<L>  |  97<L>  |  30<H>  ----------------------------<  140<H>  5.4<H>   |  18  |  1.5    Ca    8.8      2019 06:17  Mg     2.0         TPro  6.0  /  Alb  3.0<L>  /  TBili  <0.2  /  DBili      /  AST  14  /  ALT  9   /  AlkPhos  66                            8.6    13.15 )-----------( 293      ( 2019 06:17 )             26.9       Urine Studies:  Urinalysis Basic - ( 2019 02:00 )    Color: Yellow / Appearance: Clear / S.010 / pH:   Gluc:  / Ketone: Negative  / Bili: Negative / Urobili: 0.2 mg/dL   Blood:  / Protein: 30 mg/dL / Nitrite: Negative   Leuk Esterase: Negative / RBC:  / WBC    Sq Epi:  / Non Sq Epi: Few /HPF / Bacteria: Few /HPF        RADIOLOGY & ADDITIONAL STUDIES:

## 2019-01-16 NOTE — PROGRESS NOTE ADULT - PROVIDER SPECIALTY LIST ADULT
Cardiology
Internal Medicine
Nephrology
Internal Medicine

## 2019-01-16 NOTE — PHYSICAL THERAPY INITIAL EVALUATION ADULT - PERTINENT HX OF CURRENT PROBLEM, REHAB EVAL
Patient c/o abdominal pain on admission. Patient diagnosed with Divwerticulitis. While in hospital, patient reports flare of gout arthritis on B feet. Patient currently c/o pain on B feet on weight bearing

## 2019-01-16 NOTE — PROGRESS NOTE ADULT - ASSESSMENT
82 yo F PMH HTN, CKD 4, recent DVT/PE , gout, CHF s/p PPM initially presented with acute lower abdominal pain and dysuria found to have acute sigmoid diverticulitis, UTI, and CHF exacerbation. Patient was also found to have elevated cr of 1.9 at presentation (baseline 1.5), improved back to baseline with gentle hydration.     #OMERO on CKD 3/4 (resolved)  Cr 1.9-->1.7-->1.5  OMERO likely prerenal secondary to reduced fluid intake with Lasix and CHF exacerbation    - Continue PO Lasix for CHF  - Monitor Input/Output  - Pending iron studies, outpatient EPO  - Renal vancomycin dosing: CrCl: 23 ml/min: 15 mg/kg x1, then continue usual dose  - Vancomycin trough  (keep level 15-20)  Outpatient f/u Dr. stiles 82 yo F PMH HTN, CKD 4, recent DVT/PE , gout, CHF s/p PPM initially presented with acute lower abdominal pain and dysuria found to have acute sigmoid diverticulitis, UTI, and CHF exacerbation. Patient was also found to have elevated cr of 1.9 at presentation (baseline 1.5), improved back to baseline with gentle hydration.     #OMERO on CKD 3/4 (resolved)  Cr 1.9-->1.7-->1.5  OMERO likely prerenal secondary to reduced fluid intake with Lasix and CHF exacerbation    - Continue PO Lasix for CHF  - Pending iron studies, outpatient EPO  - Renal vancomycin dosing: CrCl: 23 ml/min: 15 mg/kg x1, then continue usual dose  - Vancomycin trough  (keep level 15-20)    Outpatient f/u Dr. stiles

## 2019-01-16 NOTE — PROGRESS NOTE ADULT - SUBJECTIVE AND OBJECTIVE BOX
LENGTH OF HOSPITAL STAY: 7d    CHIEF COMPLAINT:   Patient is a 83y old  Female who presents with a chief complaint of lower abdominal pain (15 Navin 2019 15:32)      HISTORY OF PRESENTING ILLNESS:    HPI:  83 yr old female with pmhx of HTN, CKD 4, recent DVT/PE (thought provoked by travel, completed 6mo a/c and no longer on Eliquis) CHF s/p PPM, sigmoid diverticulitis treated with antibiotics p/w lower abdominal pain x 1 D duration. Acute, mild to moderate, constant, dull, suprapubic and left lower abdominal a/w burning urination but no fever,  diarrhoea or constipation.     She was admitted in hospital in november 2018 for CHF exacerbation and course was complicated by Sigmoid diverticulitis and she was treated with antibiotics. (09 Jan 2019 12:16)      Patient still c/o of pain in both knees and ankles.  Felt slightly better with dose of Solumedrol . Abdominal pain has improved    PAST MEDICAL & SURGICAL HISTORY  PAST MEDICAL & SURGICAL HISTORY:  Diverticulitis: sigmoid  Gout  Hypertension  Pacemaker  Chronic kidney disease  Artificial pacemaker    SOCIAL HISTORY:    ALLERGIES:  Keflex (Unknown)    MEDICATIONS:  STANDING MEDICATIONS  ALBUTerol/ipratropium for Nebulization 3 milliLiter(s) Nebulizer every 4 hours  atorvastatin 40 milliGRAM(s) Oral at bedtime  calcitriol   Capsule 0.25 MICROGram(s) Oral daily  ciprofloxacin     Tablet 250 milliGRAM(s) Oral every 12 hours  folic acid 1 milliGRAM(s) Oral daily  furosemide    Tablet 40 milliGRAM(s) Oral daily  heparin  Injectable 5000 Unit(s) SubCutaneous every 8 hours  latanoprost 0.005% Ophthalmic Solution 1 Drop(s) Both EYES at bedtime  metoprolol tartrate 50 milliGRAM(s) Oral two times a day  metroNIDAZOLE    Tablet 500 milliGRAM(s) Oral every 12 hours  pantoprazole    Tablet 40 milliGRAM(s) Oral before breakfast  predniSONE   Tablet 20 milliGRAM(s) Oral daily  sevelamer hydrochloride 800 milliGRAM(s) Oral three times a day with meals    PRN MEDICATIONS  ALBUTerol    90 MICROgram(s) HFA Inhaler 2 Puff(s) Inhalation every 4 hours PRN  guaiFENesin    Syrup 100 milliGRAM(s) Oral every 6 hours PRN  LORazepam     Tablet 0.5 milliGRAM(s) Oral daily PRN    VITALS:   T(F): 96.9  HR: 80  BP: 145/73  RR: 18  SpO2: 99%    LABS:                        8.6    13.15 )-----------( 293      ( 16 Jan 2019 06:17 )             26.9     01-15    134<L>  |  97<L>  |  22<H>  ----------------------------<  82  4.1   |  23  |  1.7<H>    Ca    8.5      15 Navin 2019 08:18  Mg     1.4     01-15    TPro  6.1  /  Alb  3.1<L>  /  TBili  <0.2  /  DBili  x   /  AST  17  /  ALT  10  /  AlkPhos  75  01-15                  RADIOLOGY:    PHYSICAL EXAM:  GEN: No acute distress  HEENT:   LUNGS: Clear to auscultation bilaterally   HEART: S1/S2 present. RRR.   ABD: Soft, non-tender, non-distended. Bowel sounds present  EXT:  NEURO: AAOX3

## 2019-01-17 LAB
CULTURE RESULTS: SIGNIFICANT CHANGE UP
SPECIMEN SOURCE: SIGNIFICANT CHANGE UP

## 2019-01-18 DIAGNOSIS — R82.71 BACTERIURIA: ICD-10-CM

## 2019-01-18 DIAGNOSIS — M10.9 GOUT, UNSPECIFIED: ICD-10-CM

## 2019-01-18 DIAGNOSIS — D64.9 ANEMIA, UNSPECIFIED: ICD-10-CM

## 2019-01-18 DIAGNOSIS — N18.4 CHRONIC KIDNEY DISEASE, STAGE 4 (SEVERE): ICD-10-CM

## 2019-01-18 DIAGNOSIS — I13.0 HYPERTENSIVE HEART AND CHRONIC KIDNEY DISEASE WITH HEART FAILURE AND STAGE 1 THROUGH STAGE 4 CHRONIC KIDNEY DISEASE, OR UNSPECIFIED CHRONIC KIDNEY DISEASE: ICD-10-CM

## 2019-01-18 DIAGNOSIS — Z86.711 PERSONAL HISTORY OF PULMONARY EMBOLISM: ICD-10-CM

## 2019-01-18 DIAGNOSIS — I25.10 ATHEROSCLEROTIC HEART DISEASE OF NATIVE CORONARY ARTERY WITHOUT ANGINA PECTORIS: ICD-10-CM

## 2019-01-18 DIAGNOSIS — Z88.1 ALLERGY STATUS TO OTHER ANTIBIOTIC AGENTS STATUS: ICD-10-CM

## 2019-01-18 DIAGNOSIS — R10.9 UNSPECIFIED ABDOMINAL PAIN: ICD-10-CM

## 2019-01-18 DIAGNOSIS — J06.9 ACUTE UPPER RESPIRATORY INFECTION, UNSPECIFIED: ICD-10-CM

## 2019-01-18 DIAGNOSIS — I50.33 ACUTE ON CHRONIC DIASTOLIC (CONGESTIVE) HEART FAILURE: ICD-10-CM

## 2019-01-18 DIAGNOSIS — R07.89 OTHER CHEST PAIN: ICD-10-CM

## 2019-01-18 DIAGNOSIS — Z95.0 PRESENCE OF CARDIAC PACEMAKER: ICD-10-CM

## 2019-01-18 DIAGNOSIS — E83.42 HYPOMAGNESEMIA: ICD-10-CM

## 2019-01-18 DIAGNOSIS — I82.491 ACUTE EMBOLISM AND THROMBOSIS OF OTHER SPECIFIED DEEP VEIN OF RIGHT LOWER EXTREMITY: ICD-10-CM

## 2019-01-18 DIAGNOSIS — N17.9 ACUTE KIDNEY FAILURE, UNSPECIFIED: ICD-10-CM

## 2019-01-18 DIAGNOSIS — J44.0 CHRONIC OBSTRUCTIVE PULMONARY DISEASE WITH (ACUTE) LOWER RESPIRATORY INFECTION: ICD-10-CM

## 2019-01-18 DIAGNOSIS — E86.0 DEHYDRATION: ICD-10-CM

## 2019-01-18 DIAGNOSIS — I27.20 PULMONARY HYPERTENSION, UNSPECIFIED: ICD-10-CM

## 2019-01-18 DIAGNOSIS — K57.32 DIVERTICULITIS OF LARGE INTESTINE WITHOUT PERFORATION OR ABSCESS WITHOUT BLEEDING: ICD-10-CM

## 2019-01-18 DIAGNOSIS — E87.5 HYPERKALEMIA: ICD-10-CM

## 2019-01-28 ENCOUNTER — APPOINTMENT (OUTPATIENT)
Dept: VASCULAR SURGERY | Facility: CLINIC | Age: 84
End: 2019-01-28

## 2019-05-31 ENCOUNTER — APPOINTMENT (OUTPATIENT)
Dept: CARDIOLOGY | Facility: CLINIC | Age: 84
End: 2019-05-31

## 2019-06-06 NOTE — DISCHARGE NOTE ADULT - NSFTFSERV2RD_GEN_ALL_CORE
2019  Olga Prater is a 32 y.o., female.    Olga Prater is a 32 y.o. female  @ 37w5d here for C/S.  Her current obstetrical history is complicated by prior open myomectomy and prior delivery complicated by uterine fibroids, LTCS and PreEclampsia.      OB History    Para Term  AB Living   2 1 1 0 0 1   SAB TAB Ectopic Multiple Live Births   0 0 0 0 1      # Outcome Date GA Lbr Naldo/2nd Weight Sex Delivery Anes PTL Lv   2 Current            1 Term 17 38w3d  2.77 kg (6 lb 1.7 oz) M CS-LTranv EPI, Spinal N DREA      Complications: Fetal Intolerance, Severe preeclampsia       Wt Readings from Last 1 Encounters:   19 0929 64.5 kg (142 lb 3.2 oz)       BP Readings from Last 3 Encounters:   19 120/80   19 120/70   19 120/70       Patient Active Problem List   Diagnosis    Status post myomectomy    History of pre-eclampsia    35 weeks gestation of pregnancy       Past Surgical History:   Procedure Laterality Date    BREAST BIOPSY      fibroadenoma     SECTION      for NRFHTs, induction for Severe PreEclampsia    DELIVERY- SECTION N/A 2017    Performed by Didi Ortega DO at [facility] L[facility]D    MYOMECTOMY  2017    MYOMECTOMY- OPEN N/A 2017    Performed by Mohan Pastrana MD at [facility] OR    WISDOM TOOTH EXTRACTION         Social History     Socioeconomic History    Marital status:      Spouse name: Not on file    Number of children: Not on file    Years of education: Not on file    Highest education level: Not on file   Occupational History    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     Employer: [employer] ([employer])   Social Needs    Financial resource strain: Not on file    Food insecurity:     Worry: Not on file     Inability: Not on file    Transportation needs:     Medical: Not on file     Non-medical:  Not on file   Tobacco Use    Smoking status: Never Smoker    Smokeless tobacco: Never Used   Substance and Sexual Activity    Alcohol use: Yes     Comment: social     Drug use: No    Sexual activity: Yes     Partners: Male     Birth control/protection: None, Coitus interruptus     Comment:    Lifestyle    Physical activity:     Days per week: Not on file     Minutes per session: Not on file    Stress: Not on file   Relationships    Social connections:     Talks on phone: Not on file     Gets together: Not on file     Attends Latter day service: Not on file     Active member of club or organization: Not on file     Attends meetings of clubs or organizations: Not on file     Relationship status: Not on file   Other Topics Concern    Are you pregnant or think you may be? No    Breast-feeding No   Social History Narrative    Not on file         Chemistry        Component Value Date/Time     08/07/2017 2156    K 3.3 (L) 08/07/2017 2156     08/07/2017 2156    CO2 19 (L) 08/07/2017 2156    BUN 6 08/07/2017 2156    CREATININE 0.6 08/07/2017 2156    GLU 67 (L) 12/13/2018 0900        Component Value Date/Time    CALCIUM 7.7 (L) 08/07/2017 2156    ALKPHOS 218 (H) 08/07/2017 2156    AST 17 08/07/2017 2156    ALT 13 08/07/2017 2156    BILITOT 0.3 08/07/2017 2156    ESTGFRAFRICA >60 08/07/2017 2156    EGFRNONAA >60 08/07/2017 2156            Lab Results   Component Value Date    WBC 10.46 03/12/2019    HGB 10.7 (L) 03/12/2019    HCT 34.5 (L) 03/12/2019    MCV 87 03/12/2019     03/12/2019       No results for input(s): PT, INR, PROTIME, APTT in the last 72 hours.      Anesthesia Evaluation    I have reviewed the Patient Summary Reports.    I have reviewed the Nursing Notes.   I have reviewed the Medications.     Review of Systems  Anesthesia Hx:  No problems with previous Anesthesia  History of prior surgery of interest to airway management or planning:  Denies Personal Hx of Anesthesia  complications.   Social:  Non-Smoker, No Alcohol Use    Hematology/Oncology:     Oncology Normal    -- Anemia:   EENT/Dental:EENT/Dental Normal   Cardiovascular:   Exercise tolerance: good    Pulmonary:  Pulmonary Normal    Renal/:  Renal/ Normal     Neurological:  Neurology Normal    Endocrine:  Endocrine Normal    Psych:  Psychiatric Normal           Physical Exam  General:  Well nourished    Airway/Jaw/Neck:  Airway Findings: Mouth Opening: Normal Tongue: Normal  General Airway Assessment: Adult  Mallampati: II  Improves to II with phonation.  TM Distance: Normal, at least 6 cm  Jaw/Neck Findings:  Neck ROM: Normal ROM      Dental:  Dental Findings: In tact   Chest/Lungs:  Chest/Lungs Findings: Clear to auscultation, Normal Respiratory Rate     Heart/Vascular:  Heart Findings: Rate: Normal  Rhythm: Regular Rhythm  Sounds: Normal  Heart murmur: negative Vascular Findings: Normal    Abdomen:  Abdomen Findings: Normal    Musculoskeletal:  Musculoskeletal Findings: Normal   Skin:  Skin Findings: Normal    Mental Status:  Mental Status Findings: Normal        Anesthesia Plan  Type of Anesthesia, risks & benefits discussed:  Anesthesia Type:  general, CSE  Patient's Preference:   Intra-op Monitoring Plan: standard ASA monitors  Intra-op Monitoring Plan Comments:   Post Op Pain Control Plan: multimodal analgesia  Post Op Pain Control Plan Comments:   Induction:   IV  Beta Blocker:  Patient is not currently on a Beta-Blocker (No further documentation required).       Informed Consent: Patient understands risks and agrees with Anesthesia plan.  Questions answered. Anesthesia consent signed with patient.  ASA Score: 2     Day of Surgery Review of History & Physical:    H&P update referred to the provider.         Ready For Surgery From Anesthesia Perspective.        Nursing/Physical Therapy

## 2019-06-11 ENCOUNTER — APPOINTMENT (OUTPATIENT)
Dept: CARDIOLOGY | Facility: CLINIC | Age: 84
End: 2019-06-11
Payer: MEDICARE

## 2019-06-11 VITALS
HEART RATE: 60 BPM | BODY MASS INDEX: 25.16 KG/M2 | WEIGHT: 142 LBS | HEIGHT: 63 IN | DIASTOLIC BLOOD PRESSURE: 70 MMHG | SYSTOLIC BLOOD PRESSURE: 150 MMHG

## 2019-06-11 PROCEDURE — 93000 ELECTROCARDIOGRAM COMPLETE: CPT

## 2019-06-11 PROCEDURE — 99214 OFFICE O/P EST MOD 30 MIN: CPT

## 2019-06-11 RX ORDER — APIXABAN 2.5 MG/1
2.5 TABLET, FILM COATED ORAL
Qty: 60 | Refills: 6 | Status: DISCONTINUED | COMMUNITY
End: 2019-06-11

## 2019-06-11 NOTE — PHYSICAL EXAM
[General Appearance - Well Developed] : well developed [Normal Appearance] : normal appearance [Well Groomed] : well groomed [No Deformities] : no deformities [General Appearance - Well Nourished] : well nourished [Normal Conjunctiva] : the conjunctiva exhibited no abnormalities [Eyelids - No Xanthelasma] : the eyelids demonstrated no xanthelasmas [General Appearance - In No Acute Distress] : no acute distress [No Oral Pallor] : no oral pallor [Normal Oral Mucosa] : normal oral mucosa [Normal Jugular Venous A Waves Present] : normal jugular venous A waves present [Normal Jugular Venous V Waves Present] : normal jugular venous V waves present [No Oral Cyanosis] : no oral cyanosis [Respiration, Rhythm And Depth] : normal respiratory rhythm and effort [No Jugular Venous Baez A Waves] : no jugular venous baez A waves [Auscultation Breath Sounds / Voice Sounds] : lungs were clear to auscultation bilaterally [Exaggerated Use Of Accessory Muscles For Inspiration] : no accessory muscle use [Heart Rate And Rhythm] : heart rate and rhythm were normal [Heart Sounds] : normal S1 and S2 [Murmurs] : no murmurs present [Bowel Sounds] : normal bowel sounds [Abdomen Soft] : soft [Abdomen Mass (___ Cm)] : no abdominal mass palpated [Nail Clubbing] : no clubbing of the fingernails [Cyanosis, Localized] : no localized cyanosis [Petechial Hemorrhages (___cm)] : no petechial hemorrhages [Oriented To Time, Place, And Person] : oriented to person, place, and time [] : no ischemic changes [Affect] : the affect was normal [Mood] : the mood was normal [Normal Rate] : normal [No Anxiety] : not feeling anxious [II] : a grade 2 [No Pitting Edema] : no pitting edema present [1+] : left 1+ [FreeTextEntry1] : Epigastric tensderness.

## 2019-06-11 NOTE — REASON FOR VISIT
[Follow-Up - Clinic] : a clinic follow-up of [Cardiomyopathy] : cardiomyopathy [Chest Pain] : chest pain [Hyperlipidemia] : hyperlipidemia [Hypertension] : hypertension [Discharge Date: ___] : Discharge Date: [unfilled] [Admitted for Heart Failure] : patient was not admitted for heart failure

## 2019-06-11 NOTE — REVIEW OF SYSTEMS
[Chest Pain] : chest pain [Palpitations] : palpitations [see HPI] : see HPI [Negative] : Psychiatric

## 2019-06-11 NOTE — HISTORY OF PRESENT ILLNESS
[FreeTextEntry1] : The patient  had an echocardiogram from Dr. Celeste's office . There was a question of having an echodensity near the TV  The patient does have an ICD with lead that transverses the TV . No SOB . No chest pain . She has had right shoulder pain . . She had some prerenal azotemia and Dr. Celeste had decreased the Lasix .

## 2019-06-11 NOTE — ASSESSMENT
[FreeTextEntry1] : The patient has a known nonischemic CM . SHe has had multiple caths which showed no CAD . The patient has had a DVT  after a long plane ride . The patient has significant prerenal azotemia  which is needed to maintain her out of HF . Lasix was decreased by Dr. Celeste . Her creatinine was increased and is is over 80 . Dose of Elquis should be decreased. There is a question of an echodensity around the TV . Suspect that this may be her ICD lead. Will repeat echo . May need SREE . SHe has not followed up with vascular . ? continued need for A/C

## 2019-06-12 ENCOUNTER — APPOINTMENT (OUTPATIENT)
Dept: CARDIOLOGY | Facility: CLINIC | Age: 84
End: 2019-06-12
Payer: MEDICARE

## 2019-06-12 PROCEDURE — 93306 TTE W/DOPPLER COMPLETE: CPT

## 2019-06-28 ENCOUNTER — APPOINTMENT (OUTPATIENT)
Dept: VASCULAR SURGERY | Facility: CLINIC | Age: 84
End: 2019-06-28
Payer: MEDICARE

## 2019-06-28 VITALS — SYSTOLIC BLOOD PRESSURE: 130 MMHG | DIASTOLIC BLOOD PRESSURE: 80 MMHG

## 2019-06-28 PROCEDURE — 93970 EXTREMITY STUDY: CPT

## 2019-06-28 PROCEDURE — 99213 OFFICE O/P EST LOW 20 MIN: CPT

## 2019-06-28 NOTE — ASSESSMENT
[FreeTextEntry1] : Patient complains of bilateral knee discomfort likely related to her ruptured Baker's cyst and arthritis. She has had a chronic left popliteal DVT for which she is now received 2 years of anticoagulation with Eliquis.  She tells me this is the reason for her anticoagulation. If this is confirmed then she certainly can be taken off anticoagulation. She has not needed any formal followup.

## 2019-06-28 NOTE — CONSULT LETTER
[Courtesy Letter:] : I had the pleasure of seeing your patient, [unfilled], in my office today. [Dear  ___] : Dear  [unfilled], [Consult Closing:] : Thank you very much for allowing me to participate in the care of this patient.  If you have any questions, please do not hesitate to contact me. [Please see my note below.] : Please see my note below. [Sincerely,] : Sincerely, [FreeTextEntry2] : Dr Sterling Mckinney\par Dr Wade aTo

## 2019-06-28 NOTE — DATA REVIEWED
[FreeTextEntry1] : Duplex ultrasound today shows chronic left popliteal DVT. Bilateral ruptured Baker cyst also seen.

## 2019-07-05 NOTE — PATIENT PROFILE ADULT - NSPROMEDSBROUGHTTOHOSP_GEN_A_NUR
Occupational Therapy Daily Treatment Note     Name: Alvina Guallpa  Clinic Number: [id number]    Therapy Diagnosis:   Encounter Diagnosis   Name Primary?    Traumatic closed displaced fracture of distal end of radius and ulna, left, initial encounter Yes     Physician: River Russo DO    Visit Date: 7/5/2019    Physician Orders: ROM and strengthening L wrist  Medical Diagnosis: closed displaced fracture distal end of radius and ulna L UE  Evaluation Date: 06/11/2019  Authorization Period Expiration: 1231/2019  Plan of Care Certification Period: 07/19/2019  Visit #/Visits authorized: 6/12    Time In: 1100  Time Out: 1200  Total Billable Time: 45 minutes    Precautions: Standard and Fall    Subjective     Pt reports: no complaints  She was compliant with home exercise program.  Response to previous treatment: positive  Functional change: improving ROM and strength; L UE looks much improved with skin. Patient has cleaned skin and applied lotion making it much improved from peeling skin prior.    Pain: 3/10  Location: left wrist    Objective     Alvina received hot pack for 15 minutes to L wrist.    Alvina received therapeutic exercises to develop strength, ROM and flexibility including:  Extra light digi flex L hand x 30  AROM L wrist ext, flex, supination, radial/ulnar dev x 30 each using 1# dumbbell  Power flexor 6# BUE x 25    Home Exercises Provided and Patient Education Provided     Education provided:   - HEP    Written Home Exercises Provided: Patient instructed to cont prior HEP.  Exercises were reviewed and Alvina was able to demonstrate them prior to the end of the session.  Alvina demonstrated good  understanding of the education provided.     See EMR under Patient Instructions for exercises provided prior visit.    Assessment       Alvina is progressing well towards her goals.   Pt prognosis is Good.     Pt will continue to benefit from skilled outpatient occupational therapy to address the deficits listed  in the problem list box on initial evaluation, provide pt/family education and to maximize pt's level of independence in the home and community environment.     Pt's spiritual, cultural and educational needs considered and pt agreeable to plan of care and goals.     Anticipated barriers to occupational therapy: none    Goals:  Patient will tolerate multiple modalities L wrist to improve flexibility and decrease pain.  Patient will demonstrate increase in L wrist EXT and FLEX by 10 degrees AROM.  Patient will demonstrate AROM L wrist supination to 75 degrees within 6 weeks.  Patient will increase L  strength to 4-/5 within 6 weeks.   Patient will demonstrate independence in HEP at discharge.    Plan     Continue OT per established POC.    Caro Curry OT       Outpatient Therapy Discharge Summary     Name: Alvina Guallpa  Clinic Number: [id number]    Therapy Diagnosis:   Encounter Diagnosis   Name Primary?    Traumatic closed displaced fracture of distal end of radius and ulna, left, initial encounter Yes     Physician: River Russo DO    Physician Orders: OT ROM and strengthening L wrist  Medical Diagnosis: traumatic closed displaced fx of distal end of radius and ulna LUE  Evaluation Date: 06/11/2019      Date of Last visit: 07/05/2019  Total Visits Received: 6  Cancelled Visits: 0  No Show Visits: 0    Assessment    Patient states she feels ready to discharge and feels she is capable of carrying out HEP on her own.     Goals:  Patient will tolerate multiple modalities L wrist to improve flexibility and decrease pain.  Patient will demonstrate increase in L wrist EXT and FLEX by 10 degrees AROM.  Patient will demonstrate AROM L wrist supination to 75 degrees within 6 weeks.  Patient will increase L  strength to 4-/5 within 6 weeks.   Patient will demonstrate independence in HEP at discharge.    Discharge reason: Patient requested discharge and patient has met current goals     Plan   This  patient is discharged from Occupational Therapy    Caro Curry OT       no

## 2019-08-16 ENCOUNTER — APPOINTMENT (OUTPATIENT)
Dept: CARDIOLOGY | Facility: CLINIC | Age: 84
End: 2019-08-16
Payer: MEDICARE

## 2019-08-16 VITALS
WEIGHT: 142 LBS | HEART RATE: 63 BPM | BODY MASS INDEX: 25.16 KG/M2 | SYSTOLIC BLOOD PRESSURE: 150 MMHG | DIASTOLIC BLOOD PRESSURE: 80 MMHG | HEIGHT: 63 IN

## 2019-08-16 PROCEDURE — 93000 ELECTROCARDIOGRAM COMPLETE: CPT

## 2019-08-16 PROCEDURE — 99214 OFFICE O/P EST MOD 30 MIN: CPT

## 2019-08-16 RX ORDER — TRAMADOL HYDROCHLORIDE AND ACETAMINOPHEN 37.5; 325 MG/1; MG/1
37.5-325 TABLET, FILM COATED ORAL
Qty: 21 | Refills: 0 | Status: DISCONTINUED | COMMUNITY
Start: 2019-05-03 | End: 2019-08-16

## 2019-08-16 NOTE — HISTORY OF PRESENT ILLNESS
[FreeTextEntry1] : The patient was seen in [facility] with SOB . She was seen last week and this week. both time she was given lasix with improvement. Lasix was increased to 40 mg 3 days a qweek and 20 mg two days a week by her PCP

## 2019-08-16 NOTE — REASON FOR VISIT
[Cardiomyopathy] : cardiomyopathy [Follow-Up - Clinic] : a clinic follow-up of [Hyperlipidemia] : hyperlipidemia [Chest Pain] : chest pain [Hypertension] : hypertension [Discharge Date: ___] : Discharge Date: [unfilled] [Formal Caregiver] : formal caregiver [Follow-Up: _____] : a [unfilled] follow-up visit [Admitted for Heart Failure] : patient was not admitted for heart failure

## 2019-08-16 NOTE — PHYSICAL EXAM
[General Appearance - Well Developed] : well developed [Normal Appearance] : normal appearance [General Appearance - Well Nourished] : well nourished [Well Groomed] : well groomed [General Appearance - In No Acute Distress] : no acute distress [No Deformities] : no deformities [Normal Conjunctiva] : the conjunctiva exhibited no abnormalities [No Oral Pallor] : no oral pallor [Normal Oral Mucosa] : normal oral mucosa [Eyelids - No Xanthelasma] : the eyelids demonstrated no xanthelasmas [No Oral Cyanosis] : no oral cyanosis [Normal Jugular Venous A Waves Present] : normal jugular venous A waves present [No Jugular Venous Baez A Waves] : no jugular venous baez A waves [Normal Jugular Venous V Waves Present] : normal jugular venous V waves present [Respiration, Rhythm And Depth] : normal respiratory rhythm and effort [Exaggerated Use Of Accessory Muscles For Inspiration] : no accessory muscle use [Auscultation Breath Sounds / Voice Sounds] : lungs were clear to auscultation bilaterally [Heart Rate And Rhythm] : heart rate and rhythm were normal [Murmurs] : no murmurs present [Heart Sounds] : normal S1 and S2 [Abdomen Soft] : soft [Bowel Sounds] : normal bowel sounds [Abdomen Mass (___ Cm)] : no abdominal mass palpated [Nail Clubbing] : no clubbing of the fingernails [Cyanosis, Localized] : no localized cyanosis [Petechial Hemorrhages (___cm)] : no petechial hemorrhages [Oriented To Time, Place, And Person] : oriented to person, place, and time [] : no ischemic changes [Affect] : the affect was normal [Mood] : the mood was normal [No Anxiety] : not feeling anxious [Normal Rate] : normal [II] : a grade 2 [1+] : left 1+ [No Pitting Edema] : no pitting edema present [FreeTextEntry1] : severe pain in knees  difficult walking.

## 2019-08-16 NOTE — ASSESSMENT
[FreeTextEntry1] : The patient has a known nonischemic CM . She has mid range EF for HF . There is a signficant diastolic component from hypertensive heart disease as well. there is a balcne between pulmnary congestion and the need for Lasix to be increased and her renal function . BP today is stable She does not have edema . Using this regimen however  had seen her visit the ER twice for HF .

## 2019-08-16 NOTE — REVIEW OF SYSTEMS
[Chest Pain] : chest pain [see HPI] : see HPI [Palpitations] : palpitations [Negative] : Psychiatric

## 2019-08-16 NOTE — CONSULT LETTER
[Dear  ___] : Dear  [unfilled], [Courtesy Letter:] : I had the pleasure of seeing your patient, [unfilled], in my office today. [Please see my note below.] : Please see my note below. [Consult Closing:] : Thank you very much for allowing me to participate in the care of this patient.  If you have any questions, please do not hesitate to contact me. [Sincerely,] : Sincerely, [FreeTextEntry2] : Dr Sterling Mckinney\par Dr Wade Tao

## 2019-08-19 ENCOUNTER — LABORATORY RESULT (OUTPATIENT)
Age: 84
End: 2019-08-19

## 2019-08-19 ENCOUNTER — OUTPATIENT (OUTPATIENT)
Dept: OUTPATIENT SERVICES | Facility: HOSPITAL | Age: 84
LOS: 1 days | Discharge: HOME | End: 2019-08-19

## 2019-08-19 DIAGNOSIS — I50.20 UNSPECIFIED SYSTOLIC (CONGESTIVE) HEART FAILURE: ICD-10-CM

## 2019-08-19 DIAGNOSIS — Z95.0 PRESENCE OF CARDIAC PACEMAKER: Chronic | ICD-10-CM

## 2019-08-26 ENCOUNTER — LABORATORY RESULT (OUTPATIENT)
Age: 84
End: 2019-08-26

## 2019-08-26 ENCOUNTER — OUTPATIENT (OUTPATIENT)
Dept: OUTPATIENT SERVICES | Facility: HOSPITAL | Age: 84
LOS: 1 days | Discharge: HOME | End: 2019-08-26

## 2019-08-26 DIAGNOSIS — Z95.0 PRESENCE OF CARDIAC PACEMAKER: Chronic | ICD-10-CM

## 2019-08-26 DIAGNOSIS — I50.20 UNSPECIFIED SYSTOLIC (CONGESTIVE) HEART FAILURE: ICD-10-CM

## 2019-08-27 ENCOUNTER — CLINICAL ADVICE (OUTPATIENT)
Age: 84
End: 2019-08-27

## 2019-09-02 ENCOUNTER — LABORATORY RESULT (OUTPATIENT)
Age: 84
End: 2019-09-02

## 2019-09-03 ENCOUNTER — LABORATORY RESULT (OUTPATIENT)
Age: 84
End: 2019-09-03

## 2019-09-03 ENCOUNTER — OUTPATIENT (OUTPATIENT)
Dept: OUTPATIENT SERVICES | Facility: HOSPITAL | Age: 84
LOS: 1 days | Discharge: HOME | End: 2019-09-03

## 2019-09-03 DIAGNOSIS — Z02.9 ENCOUNTER FOR ADMINISTRATIVE EXAMINATIONS, UNSPECIFIED: ICD-10-CM

## 2019-09-03 DIAGNOSIS — Z95.0 PRESENCE OF CARDIAC PACEMAKER: Chronic | ICD-10-CM

## 2019-09-03 DIAGNOSIS — I50.20 UNSPECIFIED SYSTOLIC (CONGESTIVE) HEART FAILURE: ICD-10-CM

## 2019-09-04 ENCOUNTER — OUTPATIENT (OUTPATIENT)
Dept: OUTPATIENT SERVICES | Facility: HOSPITAL | Age: 84
LOS: 1 days | Discharge: HOME | End: 2019-09-04

## 2019-09-04 ENCOUNTER — LABORATORY RESULT (OUTPATIENT)
Age: 84
End: 2019-09-04

## 2019-09-04 DIAGNOSIS — I50.20 UNSPECIFIED SYSTOLIC (CONGESTIVE) HEART FAILURE: ICD-10-CM

## 2019-09-04 DIAGNOSIS — Z95.0 PRESENCE OF CARDIAC PACEMAKER: Chronic | ICD-10-CM

## 2019-09-09 ENCOUNTER — OUTPATIENT (OUTPATIENT)
Dept: OUTPATIENT SERVICES | Facility: HOSPITAL | Age: 84
LOS: 1 days | Discharge: HOME | End: 2019-09-09

## 2019-09-09 ENCOUNTER — LABORATORY RESULT (OUTPATIENT)
Age: 84
End: 2019-09-09

## 2019-09-09 DIAGNOSIS — I50.20 UNSPECIFIED SYSTOLIC (CONGESTIVE) HEART FAILURE: ICD-10-CM

## 2019-09-09 DIAGNOSIS — Z95.0 PRESENCE OF CARDIAC PACEMAKER: Chronic | ICD-10-CM

## 2019-09-09 DIAGNOSIS — Z02.9 ENCOUNTER FOR ADMINISTRATIVE EXAMINATIONS, UNSPECIFIED: ICD-10-CM

## 2019-09-27 ENCOUNTER — EMERGENCY (EMERGENCY)
Facility: HOSPITAL | Age: 84
LOS: 0 days | Discharge: HOME | End: 2019-09-27
Attending: EMERGENCY MEDICINE | Admitting: EMERGENCY MEDICINE
Payer: MEDICARE

## 2019-09-27 VITALS
OXYGEN SATURATION: 98 % | HEART RATE: 69 BPM | TEMPERATURE: 98 F | RESPIRATION RATE: 18 BRPM | SYSTOLIC BLOOD PRESSURE: 109 MMHG | DIASTOLIC BLOOD PRESSURE: 59 MMHG

## 2019-09-27 DIAGNOSIS — R30.0 DYSURIA: ICD-10-CM

## 2019-09-27 DIAGNOSIS — M54.5 LOW BACK PAIN: ICD-10-CM

## 2019-09-27 DIAGNOSIS — R10.31 RIGHT LOWER QUADRANT PAIN: ICD-10-CM

## 2019-09-27 DIAGNOSIS — Z88.1 ALLERGY STATUS TO OTHER ANTIBIOTIC AGENTS STATUS: ICD-10-CM

## 2019-09-27 DIAGNOSIS — R10.9 UNSPECIFIED ABDOMINAL PAIN: ICD-10-CM

## 2019-09-27 DIAGNOSIS — N17.9 ACUTE KIDNEY FAILURE, UNSPECIFIED: ICD-10-CM

## 2019-09-27 DIAGNOSIS — Z95.0 PRESENCE OF CARDIAC PACEMAKER: Chronic | ICD-10-CM

## 2019-09-27 LAB
ALBUMIN SERPL ELPH-MCNC: 3.9 G/DL — SIGNIFICANT CHANGE UP (ref 3.5–5.2)
ALP SERPL-CCNC: 99 U/L — SIGNIFICANT CHANGE UP (ref 30–115)
ALT FLD-CCNC: 15 U/L — SIGNIFICANT CHANGE UP (ref 0–41)
ANION GAP SERPL CALC-SCNC: 12 MMOL/L — SIGNIFICANT CHANGE UP (ref 7–14)
APPEARANCE UR: CLEAR — SIGNIFICANT CHANGE UP
AST SERPL-CCNC: 28 U/L — SIGNIFICANT CHANGE UP (ref 0–41)
BACTERIA # UR AUTO: NEGATIVE — SIGNIFICANT CHANGE UP
BASOPHILS # BLD AUTO: 0.04 K/UL — SIGNIFICANT CHANGE UP (ref 0–0.2)
BASOPHILS NFR BLD AUTO: 0.5 % — SIGNIFICANT CHANGE UP (ref 0–1)
BILIRUB SERPL-MCNC: <0.2 MG/DL — SIGNIFICANT CHANGE UP (ref 0.2–1.2)
BILIRUB UR-MCNC: NEGATIVE — SIGNIFICANT CHANGE UP
BUN SERPL-MCNC: 49 MG/DL — HIGH (ref 10–20)
CALCIUM SERPL-MCNC: 9 MG/DL — SIGNIFICANT CHANGE UP (ref 8.5–10.1)
CHLORIDE SERPL-SCNC: 103 MMOL/L — SIGNIFICANT CHANGE UP (ref 98–110)
CO2 SERPL-SCNC: 24 MMOL/L — SIGNIFICANT CHANGE UP (ref 17–32)
COLOR SPEC: SIGNIFICANT CHANGE UP
CREAT SERPL-MCNC: 2.3 MG/DL — HIGH (ref 0.7–1.5)
DIFF PNL FLD: NEGATIVE — SIGNIFICANT CHANGE UP
EOSINOPHIL # BLD AUTO: 0.69 K/UL — SIGNIFICANT CHANGE UP (ref 0–0.7)
EOSINOPHIL NFR BLD AUTO: 8.4 % — HIGH (ref 0–8)
EPI CELLS # UR: 1 /HPF — SIGNIFICANT CHANGE UP (ref 0–5)
GLUCOSE SERPL-MCNC: 112 MG/DL — HIGH (ref 70–99)
GLUCOSE UR QL: NEGATIVE — SIGNIFICANT CHANGE UP
HCT VFR BLD CALC: 32 % — LOW (ref 37–47)
HGB BLD-MCNC: 9.8 G/DL — LOW (ref 12–16)
HYALINE CASTS # UR AUTO: 1 /LPF — SIGNIFICANT CHANGE UP (ref 0–7)
IMM GRANULOCYTES NFR BLD AUTO: 0.4 % — HIGH (ref 0.1–0.3)
KETONES UR-MCNC: NEGATIVE — SIGNIFICANT CHANGE UP
LACTATE SERPL-SCNC: 1.6 MMOL/L — SIGNIFICANT CHANGE UP (ref 0.5–2.2)
LEUKOCYTE ESTERASE UR-ACNC: NEGATIVE — SIGNIFICANT CHANGE UP
LIDOCAIN IGE QN: 54 U/L — SIGNIFICANT CHANGE UP (ref 7–60)
LYMPHOCYTES # BLD AUTO: 2.64 K/UL — SIGNIFICANT CHANGE UP (ref 1.2–3.4)
LYMPHOCYTES # BLD AUTO: 32.2 % — SIGNIFICANT CHANGE UP (ref 20.5–51.1)
MCHC RBC-ENTMCNC: 29.3 PG — SIGNIFICANT CHANGE UP (ref 27–31)
MCHC RBC-ENTMCNC: 30.6 G/DL — LOW (ref 32–37)
MCV RBC AUTO: 95.5 FL — SIGNIFICANT CHANGE UP (ref 81–99)
MONOCYTES # BLD AUTO: 0.93 K/UL — HIGH (ref 0.1–0.6)
MONOCYTES NFR BLD AUTO: 11.4 % — HIGH (ref 1.7–9.3)
NEUTROPHILS # BLD AUTO: 3.86 K/UL — SIGNIFICANT CHANGE UP (ref 1.4–6.5)
NEUTROPHILS NFR BLD AUTO: 47.1 % — SIGNIFICANT CHANGE UP (ref 42.2–75.2)
NITRITE UR-MCNC: NEGATIVE — SIGNIFICANT CHANGE UP
NRBC # BLD: 0 /100 WBCS — SIGNIFICANT CHANGE UP (ref 0–0)
PH UR: 6 — SIGNIFICANT CHANGE UP (ref 5–8)
PLATELET # BLD AUTO: 205 K/UL — SIGNIFICANT CHANGE UP (ref 130–400)
POTASSIUM SERPL-MCNC: 4.5 MMOL/L — SIGNIFICANT CHANGE UP (ref 3.5–5)
POTASSIUM SERPL-SCNC: 4.5 MMOL/L — SIGNIFICANT CHANGE UP (ref 3.5–5)
PROT SERPL-MCNC: 6.6 G/DL — SIGNIFICANT CHANGE UP (ref 6–8)
PROT UR-MCNC: ABNORMAL
RBC # BLD: 3.35 M/UL — LOW (ref 4.2–5.4)
RBC # FLD: 13.5 % — SIGNIFICANT CHANGE UP (ref 11.5–14.5)
RBC CASTS # UR COMP ASSIST: 1 /HPF — SIGNIFICANT CHANGE UP (ref 0–4)
SODIUM SERPL-SCNC: 139 MMOL/L — SIGNIFICANT CHANGE UP (ref 135–146)
SP GR SPEC: 1.02 — SIGNIFICANT CHANGE UP (ref 1.01–1.02)
UROBILINOGEN FLD QL: SIGNIFICANT CHANGE UP
WBC # BLD: 8.19 K/UL — SIGNIFICANT CHANGE UP (ref 4.8–10.8)
WBC # FLD AUTO: 8.19 K/UL — SIGNIFICANT CHANGE UP (ref 4.8–10.8)
WBC UR QL: 2 /HPF — SIGNIFICANT CHANGE UP (ref 0–5)

## 2019-09-27 PROCEDURE — 99284 EMERGENCY DEPT VISIT MOD MDM: CPT

## 2019-09-27 PROCEDURE — 74176 CT ABD & PELVIS W/O CONTRAST: CPT | Mod: 26

## 2019-09-27 RX ORDER — SODIUM CHLORIDE 9 MG/ML
1000 INJECTION INTRAMUSCULAR; INTRAVENOUS; SUBCUTANEOUS ONCE
Refills: 0 | Status: COMPLETED | OUTPATIENT
Start: 2019-09-27 | End: 2019-09-27

## 2019-09-27 RX ADMIN — SODIUM CHLORIDE 1000 MILLILITER(S): 9 INJECTION INTRAMUSCULAR; INTRAVENOUS; SUBCUTANEOUS at 14:05

## 2019-09-27 NOTE — ED ADULT TRIAGE NOTE - CHIEF COMPLAINT QUOTE
Pt with hx CHF, pacemaker, and stage 3 kidney disease complaining of lower abdominal pain radiating down right hip and leg x 1 week. Pt states symptoms worsened today. also complaining of dysuria. denies NVD.

## 2019-09-27 NOTE — ED PROVIDER NOTE - PROGRESS NOTE DETAILS
discussed w dr rick van, regarding OMERO Cr 2.3, baseline 1.7-1.9, pt alternates between lasix 20mg and 40 mg, he recommends lasix 20mg daily x 1 week and f/u w Dr Junior 1 week for repeat labs. pt feels better, tolerating po abd soft ntnd.

## 2019-09-27 NOTE — ED ADULT NURSE NOTE - NSIMPLEMENTINTERV_GEN_ALL_ED
Implemented All Fall Risk Interventions:  [place] to call system. Call bell, personal items and telephone within reach. Instruct patient to call for assistance. Room bathroom lighting operational. Non-slip footwear when patient is off stretcher. Physically safe environment: no spills, clutter or unnecessary equipment. Stretcher in lowest position, wheels locked, appropriate side rails in place. Provide visual cue, wrist band, yellow gown, etc. Monitor gait and stability. Monitor for mental status changes and reorient to person, place, and time. Review medications for side effects contributing to fall risk. Reinforce activity limits and safety measures with patient and family.

## 2019-09-27 NOTE — ED PROVIDER NOTE - CARE PROVIDER_API CALL
Sukhjinder Urrutia)  Nephrology  [address] [place] [address]  Phone: [phone]  Fax: [phone]  Follow Up Time: 1-3 Days

## 2019-09-27 NOTE — ED PROVIDER NOTE - PHYSICAL EXAMINATION
VITALS:  I have reviewed the initial vital signs.  GENERAL: Well-developed, well-nourished elderly female, in no acute distress. Nontoxic. Accompanied by daughter.  HEENT: Sclera clear. No conjunctival injection. EOMI, PERRLA. Mucous membranes moist.  NECK: supple w FROM.  CARDIO: RRR, nl S1 and S2. No murmurs, rubs, or gallops. No peripheral edema. 2+ radial and pedal pulses bilaterally.  PULM: Normal effort. No tachypnea or retractions. CTA b/l without wheezes, rales, or rhonchi.  MSK: +right lumbar paraspinal muscle ttp. +right sciatic notch ttp. No midline thoracic or lumbar spinous tenderness, step offs, or deformity. FROM to extremities x4.  GI: Normal bowel sounds. Abdomen soft and non-distended. +RLA ttp without rebound or guarding. Negative Rovsing, Psoas, and Obturator. No pulsatile masses.  : No CVA tenderness b/l.  SKIN: Warm, dry. No pallor or rashes. Capillary refill <2 seconds.  NEURO: A&Ox3. Speech clear. No gross motor/sensory deficits.

## 2019-09-27 NOTE — ED PROVIDER NOTE - NS ED ROS FT
CONSTITUTIONAL: (-) fevers, (-) chills, (-) malaise (-) decreased appetite  NECK: (-) neck pain, (-) neck stiffness  CARDIO: (-) chest pain, (-) palpitations, (-) edema  PULM: (-) cough, (-) sputum, (-) shortness of breath  GI: see HPI  : (-) dysuria, (-) hematuria, (-) frequency, (-) flank pain, (-) incontinence  HEME: (+) on plavix and eliquis  MSK: see HPI, (-) myalgias, (-) gait difficulty  SKIN: (-) rashes, (-) wounds, (-) pallor, (-) ecchymosis  NEURO: (-) headache, (-) dizziness, (-) lightheadedness, (-) syncope, (-) numbness, (-) weakness, (-) paresthesias    *all other systems negative except as documented above and in the HPI*

## 2019-09-27 NOTE — ED PROVIDER NOTE - PATIENT PORTAL LINK FT
You can access the FollowMyHealth Patient Portal offered by [facility] by registering at the following website: [email or web address]. By joining [facility]’s FollowMyHealth portal, you will also be able to view your health information using other applications (apps) compatible with our system.

## 2019-09-27 NOTE — ED ADULT NURSE NOTE - OBJECTIVE STATEMENT
Pt presents with right lower abdominal pain that radiates to the right lower extremity. Endorses decrease in ambulation since pain started. Denies recent trauma. Able to perform ROM of right leg and ambulate with walker in ED. No obvious deformities or trauma noted. Alert and oriented x3.

## 2019-09-27 NOTE — ED PROVIDER NOTE - DISCHARGE REVIEW MATERIAL PRESENTED
Missy came to clinic today for a Lupron Stimulation Test. Patient's parents deny any fevers and/or infections. LMX cream placed for Lupron injection. PIV placed using J-Tip without difficulty.  Baseline/Scheduled labs drawn as ordered. Lupron given sub-q in back of left arm. Test completed without complication. Vital signs remained stable throughout. PIV removed without difficulty. Patient left clinic with parents in stable condition following test.  Missy will have labs drawn 24 hours following start of test.          .

## 2019-09-27 NOTE — ED PROVIDER NOTE - OBJECTIVE STATEMENT
84 year old female w hx of hysterectomy, RA not on steroids/immunosuppressants, CHF, PPM, CKD, HTN, diverticulitis presents to the ED with intermittent, sharp, right lower abdominal and hip pain with radiation into her right leg x 1 week. States symptoms are worse with ambulation (normally ambulates w walker, no recent falls/trauma). States symptoms somewhat relieved by Tylenol. Also endorses dysuria x 2 days. Denies fevers/chills, chest pain, shortness of breath, flank pain, nausea, vomiting, diarrhea, hematuria, frequency, black/bloody stools.

## 2019-09-27 NOTE — ED PROVIDER NOTE - CARE PLAN
Principal Discharge DX:	Abdominal pain Principal Discharge DX:	OMERO (acute kidney injury)  Secondary Diagnosis:	Abdominal pain

## 2019-09-27 NOTE — ED PROVIDER NOTE - ATTENDING CONTRIBUTION TO CARE
84F pmh RA not on immunosup, CHF, PCM, CKD HTN HL, s/p hysterectomy, cholecystectomy, appendectomy, p/w abd pain and dysuria x 1 week. sharp constant pain RLQ radiates to R flank and R buttock and down RLE. no trauma. no numbness, weakness. no bowel/bladder incontinence /retention. no freq, hematuria. no fever. no n/v/d/c. no cp, sob, cough. no rash.     on exam, AFVSS, well abi nad, ncat, eomi, perrla, mmm, lctab, rrr nl s1s2 no mrg, abd soft mild RLQ and suprapubic ttp, mild RCVAT, nd, aaox3, CN 2-12 intact, No nystagmus.  5/5 motor x 4 ext, SILT x 4 extremities, No facial droop or slurred speech. No pronator drift.  No midline C/T/L tenderness to palpation or step off. no le edema or calf ttp,     a/p; RLQ/R flank pain, UTI sx, will do labs, ua, ct, r/o uti/pyelo, renal colic, colitis. re-eval.

## 2019-09-27 NOTE — ED PROVIDER NOTE - CLINICAL SUMMARY MEDICAL DECISION MAKING FREE TEXT BOX
CT no acute surg pathology, UA neg, mild OMERO on CKD- discussed w renal who recommended dec lasix and f/u with Dr Junior 1 week for repeat BMP, pt feels better, tolerating po abd soft ntnd, will dc home.

## 2019-09-28 LAB
CULTURE RESULTS: SIGNIFICANT CHANGE UP
SPECIMEN SOURCE: SIGNIFICANT CHANGE UP

## 2019-10-18 NOTE — CONSULT NOTE ADULT - GENITOURINARY
Occupational Therapy Daily Treatment    Visit Count: 17  Plan of Care:  8/1/2019 Through: 10/24/2019  Insurance Information: MEDICARE  ID: [id number]     Group #: N/A  Eff Date: 4/1/2006 (calendar policy)     $ Limit: $2040 per calendar year            - Combined: No            - $ Used: $0  Auth required: No     Ded: $185 - $0 Remaining  Pays at: 80%  OOP: NONE  Secondary Insurance:WPS  Referred by: Pablo Serna MD; Next provider visit (if known/scheduled): 9/30/19  Medical Diagnosis (from order):       Diagnosis Information             Diagnosis      721.0 (ICD-9-CM) - M47.22 (ICD-10-CM) - Cervical spondylosis with radiculopathy                  Date of Onset:  May 2019  Date of Surgery: 6/19/2019; Surgery performed: fusion from C5-T2; Physician Guidelines yes  Diagnosis Precautions: none  Chart reviewed at time of initial evaluation (relevant co-morbidities, allergies, tests and medications listed): arthritis in hands; see EMR for details.     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   Has been writing better (without the splint on)    OBJECTIVE   Hand Dominance: right  Extremity Involved: right     Posture/Observation: Pt presents with increased ability to extend MP of MF and RF and less so for IF, still difficulty with SF, and unable to extend thumb radially.  Pt presents with dynamic splint on right hand/forearm, removed at beginning of session.      Range of Motion (degrees):  Wrist Active Range of Motion    Norm Left Right Right   Date   8/1/19 8/1/19 9/17/19   Wrist Flexion 80 0/70 0/58 0/58    Wrist Extension 70°   0/75 0/40 0/50    Forearm Supination 90°    0/90 0/90 NT    Forearm Pronation 90°    0/90 0/90  NT   [standard testing positions unless otherwise noted]  Comments: no pain;  * denotes pain      Range of Motion (degrees):    Left Right   Date  9/17/19     Index Finger      MCP Ext/flex 0 36 deg ext lag    PIP Ext/Flex      DIP Ext/Flex      DPC distance     Middle Finger      MCP Ext/Flex 0 14 deg ext lag    PIP Ext/Flex      DIP Ext/Flex      DPC distance     Ring Finger      MCP Ext/Flex 0 14 deg ext lag    PIP Ext/Flex      DIP Ext/Flex      DPC distance     Small Finger      MCP Ext/Flex 0 40 deg ext lag    PIP Ext/Flex      DIP Ext/Flex      DPC distance     Thumb      MCP Ext/Flex 0 17 deg ext lag    IP Ext/Flex      Radial abduction 0/45 0 to slight    Palmar abduction 0/45 0/43    Tip Opposition      Opposition to      Base of 5th digit     Key: Ext=extension, Flex=flexion, SF=small finger, MCP=metacarpophalangeal joint, PIP=proximal interphalangeal joint, DIP=distal interphalangeal joints; IP=interphalangeal joint; DPC =distal palmar crease; standard testing positions unless otherwise noted; ranges are reported in active range of motion unless noted as AA=active assistive or P=passive range of motion; * denotes pain        /Pinch (pounds of force)    Left Right Right   Date 8/1/19 8/1/19 9/17/19    23, 20, 15= 19.3# 10, 10, 10= 10# 15,10,10 = 13   Lateral Pinch 6, 4, 6= 5.3# 2, 2, 2= 2#    6,6,6=6   3 Point Pinch 4, 4, 4=4# 2, 2, 2= 2# 4,5,4=4.5   standard testing positions unless otherwise noted,* denotes pain, average reported  Norms:  : 75+ years, male: left 38-72, right 44.7-86.7; female: left 28.7-46.5, right 31.6-53.6  Key/lateral pinch: 75+ years, male: left 16.1-22.1, right 15.9-25.1; female: left 8.8-14, right 10.3-14.9  Palmar/3 point pinch: 75+ years, male: left 14.5-22.1, right 14.5-22.9; female: left 8.9-14.1, right 9.4-14.6        Treatment   Orthotic Training:  Updated splint and fitting, including straps, finger slings, etc.  Instructed pt to begin to remove splint at NOC, but place hand on pillow    Skilled input: verbal instruction/cues,  splinting    Home Program:   Wear splint at all times (may remove at NOC if desired); remove for bathing  Putty - yellow and red  8/29/19:  Digital MP extension with wrist resting on rolled towel  Wrist motion - extension, radial and ulnar deviation  Putty - tan for finger abd    Writer verbally educated the patient and received verbal consent from the patient on hand placement, positioning of patient, and techniques to be performed today including clothing adjustments for techniques, therapist position for techniques, hand placement and palpation for techniques, modality application as described above and how they are pertinent to the patient's plan of care.       Suggestions for next session as indicated: progress per plan of care, splint modifications; ROM as it returns, advance to strengthening as appropriate; pt education  Check UT tightness; US/E-stim combo  DISCHARGE SUMMARY AT LAST VISIT ON 10/24/19    ASSESSMENT   Splint fitting better.    Pain after treatment (patient reported, 0-10 scale): 0 but persistent numbness still present; \"looser than when I came in\"  Result of above outlined education: Verbalizes understanding    THERAPY DAILY BILLING   Insurance: MEDICARE 2. WPS    Evaluation Procedures:  No evaluation codes were used on this date of service    Timed Procedures:  Orthotics Training, 40 minutes    Untimed Procedures:  No untimed codes were used on this date of service     Total Treatment Time: 40 minutes           details…

## 2019-10-25 ENCOUNTER — EMERGENCY (EMERGENCY)
Facility: HOSPITAL | Age: 84
LOS: 0 days | Discharge: HOME | End: 2019-10-25
Admitting: EMERGENCY MEDICINE
Payer: MEDICARE

## 2019-10-25 VITALS
HEART RATE: 60 BPM | TEMPERATURE: 97 F | DIASTOLIC BLOOD PRESSURE: 72 MMHG | HEIGHT: 63 IN | OXYGEN SATURATION: 100 % | WEIGHT: 143.96 LBS | SYSTOLIC BLOOD PRESSURE: 140 MMHG | RESPIRATION RATE: 17 BRPM

## 2019-10-25 DIAGNOSIS — M54.41 LUMBAGO WITH SCIATICA, RIGHT SIDE: ICD-10-CM

## 2019-10-25 DIAGNOSIS — R26.89 OTHER ABNORMALITIES OF GAIT AND MOBILITY: ICD-10-CM

## 2019-10-25 DIAGNOSIS — M54.9 DORSALGIA, UNSPECIFIED: ICD-10-CM

## 2019-10-25 DIAGNOSIS — M79.604 PAIN IN RIGHT LEG: ICD-10-CM

## 2019-10-25 DIAGNOSIS — Z95.0 PRESENCE OF CARDIAC PACEMAKER: Chronic | ICD-10-CM

## 2019-10-25 DIAGNOSIS — Z88.1 ALLERGY STATUS TO OTHER ANTIBIOTIC AGENTS STATUS: ICD-10-CM

## 2019-10-25 PROCEDURE — 99283 EMERGENCY DEPT VISIT LOW MDM: CPT

## 2019-10-25 PROCEDURE — 72170 X-RAY EXAM OF PELVIS: CPT | Mod: 26

## 2019-10-25 RX ORDER — ACETAMINOPHEN 500 MG
975 TABLET ORAL ONCE
Refills: 0 | Status: COMPLETED | OUTPATIENT
Start: 2019-10-25 | End: 2019-10-25

## 2019-10-25 RX ADMIN — Medication 975 MILLIGRAM(S): at 11:50

## 2019-10-25 NOTE — ED PROVIDER NOTE - CARE PROVIDER_API CALL
Moise Ji (MD)  Orthopaedic Surgery  [address] [place] [address]  Phone: [phone]  Fax: [phone]  Follow Up Time:

## 2019-10-25 NOTE — ED PROVIDER NOTE - CLINICAL SUMMARY MEDICAL DECISION MAKING FREE TEXT BOX
Pt presenting with right lower back radiating down, pain has been intermittent for a while. had work up 1 month ago. xray reviewed today and appears normal. will follow up with ortho and rehab

## 2019-10-25 NOTE — ED PROVIDER NOTE - PHYSICAL EXAMINATION
CONST: Well appearing in NAD  NECK: Non-tender, no meningeal signs. normal ROM. supple   CARD: Normal S1 S2; Normal rate and rhythm  RESP: Equal BS B/L, No wheezes, rhonchi or rales. No distress  GI: Soft, non-tender, non-distended. no cva tenderness. normal BS  MS: + tenderness to right sciatic notch, Normal ROM in all extremities. No midline spinal tenderness. pulses 2 +. no calf tenderness or swelling  SKIN: Warm, dry, no acute rashes. Good turgor  NEURO: Strength 5/5 with no sensory deficits to lower extremities. Steady gait. no saddle anesthesia

## 2019-10-25 NOTE — ED PROVIDER NOTE - NSFOLLOWUPCLINICS_GEN_ALL_ED_FT
[facility]  Orthpedic  [address], [place]   Phone: [phone]  Fax:   Follow Up Time:

## 2019-10-25 NOTE — ED PROVIDER NOTE - CHIEF COMPLAINT
The patient is a 84y Female complaining of back pain/injury.
Normal vision: sees adequately in most situations; can see medication labels, newsprint

## 2019-10-25 NOTE — ED PROVIDER NOTE - OBJECTIVE STATEMENT
84 year old female with pmhx noted, presents with right lower back pain x few months. Pt admits pain radiates down right leg, worse with movement and ambulation. no paresthesias or weakness to lower extremities, abd pain, N/V/D, fever, chills, urinary symptoms, urinary incontinence, CP, or SOB. no leg swelling or redness. pt was seen in ED 1 month ago for same complaint and had labs and CT scan which were normal.

## 2019-10-25 NOTE — ED PROVIDER NOTE - NS ED ROS FT
Review of Systems:  	•	CONSTITUTIONAL - no fever, no diaphoresis, no chills  	•	SKIN - no rash  	•	HEMATOLOGIC - no bleeding, no bruising  	•	EYES - no eye pain, no blurry vision  	•	ENT - no change in hearing, no sore throat, no ear pain or tinnitus  	•	RESPIRATORY - no shortness of breath, no cough  	•	CARDIAC - no chest pain, no palpitations  	•	GI - no abd pain, no nausea, no vomiting, no diarrhea, no constipation  	•	GENITO-URINARY - no discharge, no dysuria; no hematuria, no increased urinary frequency  	•	MUSCULOSKELETAL - + back pain  	•	NEUROLOGIC - no weakness, no headache, no paresthesias, no LOC

## 2019-10-25 NOTE — ED PROVIDER NOTE - PATIENT PORTAL LINK FT
You can access the FollowMyHealth Patient Portal offered by [facility] by registering at the following website: [email or web address]. By joining [facility]’s FollowMyHealth portal, you will also be able to view your health information using other applications (apps) compatible with our system.

## 2019-12-09 ENCOUNTER — APPOINTMENT (OUTPATIENT)
Dept: CARDIOLOGY | Facility: CLINIC | Age: 84
End: 2019-12-09

## 2020-02-07 ENCOUNTER — INPATIENT (INPATIENT)
Facility: HOSPITAL | Age: 85
LOS: 3 days | Discharge: HOME HEALTH | End: 2020-02-11
Attending: INTERNAL MEDICINE | Admitting: INTERNAL MEDICINE
Payer: MEDICARE

## 2020-02-07 VITALS
HEART RATE: 75 BPM | OXYGEN SATURATION: 100 % | DIASTOLIC BLOOD PRESSURE: 65 MMHG | TEMPERATURE: 98 F | RESPIRATION RATE: 18 BRPM | SYSTOLIC BLOOD PRESSURE: 134 MMHG

## 2020-02-07 DIAGNOSIS — Z95.0 PRESENCE OF CARDIAC PACEMAKER: Chronic | ICD-10-CM

## 2020-02-07 LAB
ALBUMIN SERPL ELPH-MCNC: 4.3 G/DL — SIGNIFICANT CHANGE UP (ref 3.5–5.2)
ALP SERPL-CCNC: 134 U/L — HIGH (ref 30–115)
ALT FLD-CCNC: 12 U/L — SIGNIFICANT CHANGE UP (ref 0–41)
ANION GAP SERPL CALC-SCNC: 15 MMOL/L — HIGH (ref 7–14)
APTT BLD: 33 SEC — SIGNIFICANT CHANGE UP (ref 27–39.2)
AST SERPL-CCNC: 26 U/L — SIGNIFICANT CHANGE UP (ref 0–41)
BILIRUB SERPL-MCNC: 0.3 MG/DL — SIGNIFICANT CHANGE UP (ref 0.2–1.2)
BUN SERPL-MCNC: 57 MG/DL — HIGH (ref 10–20)
CALCIUM SERPL-MCNC: 9.6 MG/DL — SIGNIFICANT CHANGE UP (ref 8.5–10.1)
CHLORIDE SERPL-SCNC: 104 MMOL/L — SIGNIFICANT CHANGE UP (ref 98–110)
CO2 SERPL-SCNC: 23 MMOL/L — SIGNIFICANT CHANGE UP (ref 17–32)
CREAT SERPL-MCNC: 2.4 MG/DL — HIGH (ref 0.7–1.5)
D DIMER BLD IA.RAPID-MCNC: 273 NG/ML DDU — HIGH (ref 0–230)
FLU A RESULT: NEGATIVE — SIGNIFICANT CHANGE UP
FLU A RESULT: NEGATIVE — SIGNIFICANT CHANGE UP
FLUAV AG NPH QL: NEGATIVE — SIGNIFICANT CHANGE UP
FLUBV AG NPH QL: NEGATIVE — SIGNIFICANT CHANGE UP
GLUCOSE SERPL-MCNC: 100 MG/DL — HIGH (ref 70–99)
HCT VFR BLD CALC: 36 % — LOW (ref 37–47)
HGB BLD-MCNC: 11.3 G/DL — LOW (ref 12–16)
INR BLD: 1.12 RATIO — SIGNIFICANT CHANGE UP (ref 0.65–1.3)
MCHC RBC-ENTMCNC: 29.8 PG — SIGNIFICANT CHANGE UP (ref 27–31)
MCHC RBC-ENTMCNC: 31.4 G/DL — LOW (ref 32–37)
MCV RBC AUTO: 95 FL — SIGNIFICANT CHANGE UP (ref 81–99)
NRBC # BLD: 0 /100 WBCS — SIGNIFICANT CHANGE UP (ref 0–0)
NT-PROBNP SERPL-SCNC: 7952 PG/ML — HIGH (ref 0–300)
PLATELET # BLD AUTO: 209 K/UL — SIGNIFICANT CHANGE UP (ref 130–400)
POTASSIUM SERPL-MCNC: 4.8 MMOL/L — SIGNIFICANT CHANGE UP (ref 3.5–5)
POTASSIUM SERPL-SCNC: 4.8 MMOL/L — SIGNIFICANT CHANGE UP (ref 3.5–5)
PROT SERPL-MCNC: 7.6 G/DL — SIGNIFICANT CHANGE UP (ref 6–8)
PROTHROM AB SERPL-ACNC: 12.9 SEC — HIGH (ref 9.95–12.87)
RBC # BLD: 3.79 M/UL — LOW (ref 4.2–5.4)
RBC # FLD: 12.9 % — SIGNIFICANT CHANGE UP (ref 11.5–14.5)
RSV RESULT: NEGATIVE — SIGNIFICANT CHANGE UP
RSV RNA RESP QL NAA+PROBE: NEGATIVE — SIGNIFICANT CHANGE UP
SODIUM SERPL-SCNC: 142 MMOL/L — SIGNIFICANT CHANGE UP (ref 135–146)
TROPONIN T SERPL-MCNC: 0.01 NG/ML — SIGNIFICANT CHANGE UP
TROPONIN T SERPL-MCNC: 0.02 NG/ML — HIGH
WBC # BLD: 10.92 K/UL — HIGH (ref 4.8–10.8)
WBC # FLD AUTO: 10.92 K/UL — HIGH (ref 4.8–10.8)

## 2020-02-07 PROCEDURE — 71250 CT THORAX DX C-: CPT | Mod: 26

## 2020-02-07 PROCEDURE — 93010 ELECTROCARDIOGRAM REPORT: CPT

## 2020-02-07 PROCEDURE — 93970 EXTREMITY STUDY: CPT | Mod: 26

## 2020-02-07 PROCEDURE — 99285 EMERGENCY DEPT VISIT HI MDM: CPT

## 2020-02-07 PROCEDURE — 71045 X-RAY EXAM CHEST 1 VIEW: CPT | Mod: 26

## 2020-02-07 RX ORDER — METOPROLOL TARTRATE 50 MG
25 TABLET ORAL DAILY
Refills: 0 | Status: DISCONTINUED | OUTPATIENT
Start: 2020-02-07 | End: 2020-02-11

## 2020-02-07 RX ORDER — ATORVASTATIN CALCIUM 80 MG/1
1 TABLET, FILM COATED ORAL
Qty: 0 | Refills: 0 | DISCHARGE

## 2020-02-07 RX ORDER — METHOCARBAMOL 500 MG/1
500 TABLET, FILM COATED ORAL THREE TIMES A DAY
Refills: 0 | Status: DISCONTINUED | OUTPATIENT
Start: 2020-02-07 | End: 2020-02-11

## 2020-02-07 RX ORDER — METOPROLOL TARTRATE 50 MG
1 TABLET ORAL
Qty: 0 | Refills: 0 | DISCHARGE

## 2020-02-07 RX ORDER — LATANOPROST 0.05 MG/ML
1 SOLUTION/ DROPS OPHTHALMIC; TOPICAL
Qty: 0 | Refills: 0 | DISCHARGE

## 2020-02-07 RX ORDER — APIXABAN 2.5 MG/1
2.5 TABLET, FILM COATED ORAL
Refills: 0 | Status: DISCONTINUED | OUTPATIENT
Start: 2020-02-07 | End: 2020-02-11

## 2020-02-07 RX ORDER — SERTRALINE 25 MG/1
50 TABLET, FILM COATED ORAL DAILY
Refills: 0 | Status: DISCONTINUED | OUTPATIENT
Start: 2020-02-07 | End: 2020-02-11

## 2020-02-07 RX ORDER — PANTOPRAZOLE SODIUM 20 MG/1
40 TABLET, DELAYED RELEASE ORAL
Refills: 0 | Status: DISCONTINUED | OUTPATIENT
Start: 2020-02-07 | End: 2020-02-11

## 2020-02-07 RX ORDER — CIPROFLOXACIN HCL 0.3 %
1 DROPS OPHTHALMIC (EYE)
Refills: 0 | Status: DISCONTINUED | OUTPATIENT
Start: 2020-02-07 | End: 2020-02-11

## 2020-02-07 RX ORDER — ACETAMINOPHEN 500 MG
650 TABLET ORAL EVERY 6 HOURS
Refills: 0 | Status: DISCONTINUED | OUTPATIENT
Start: 2020-02-07 | End: 2020-02-08

## 2020-02-07 RX ORDER — FOLIC ACID 0.8 MG
1 TABLET ORAL
Qty: 0 | Refills: 0 | DISCHARGE

## 2020-02-07 RX ORDER — ATORVASTATIN CALCIUM 80 MG/1
80 TABLET, FILM COATED ORAL AT BEDTIME
Refills: 0 | Status: DISCONTINUED | OUTPATIENT
Start: 2020-02-07 | End: 2020-02-11

## 2020-02-07 RX ORDER — CALCITRIOL 0.5 UG/1
0.25 CAPSULE ORAL DAILY
Refills: 0 | Status: DISCONTINUED | OUTPATIENT
Start: 2020-02-07 | End: 2020-02-11

## 2020-02-07 RX ORDER — FLUTICASONE PROPIONATE 50 MCG
1 SPRAY, SUSPENSION NASAL
Refills: 0 | Status: DISCONTINUED | OUTPATIENT
Start: 2020-02-07 | End: 2020-02-11

## 2020-02-07 RX ORDER — FUROSEMIDE 40 MG
40 TABLET ORAL DAILY
Refills: 0 | Status: DISCONTINUED | OUTPATIENT
Start: 2020-02-07 | End: 2020-02-11

## 2020-02-07 RX ADMIN — Medication 650 MILLIGRAM(S): at 23:42

## 2020-02-07 RX ADMIN — APIXABAN 2.5 MILLIGRAM(S): 2.5 TABLET, FILM COATED ORAL at 18:31

## 2020-02-07 RX ADMIN — ATORVASTATIN CALCIUM 80 MILLIGRAM(S): 80 TABLET, FILM COATED ORAL at 21:58

## 2020-02-07 RX ADMIN — Medication 650 MILLIGRAM(S): at 22:42

## 2020-02-07 NOTE — H&P ADULT - ASSESSMENT
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 chest pain currently resolved. C/o lower back pain on left side. Trop x1 negative, EKG showed paced rhythm.    ASSESSMENT AND PLAN:    #Chest pain  -Pleuritic chest pain and viral URI symptoms  -Currently chest pain resolved  -Trop x1 negative. f/u trops for 8pm  -EKG showed no acute ischemic changes, atrial sensed ventricular paced rhythm  -CT chest and X ray chest normal   -No signs of distress, hypoxia, hypotension  -Will order duplex LE to r/o DVT  -ECHO ordered    #Back pain  -Lower back pain on left side with tenderness on palpation   -Likely muscle spasm  -Robaxin ordered PRN    #Otitis externa  -c/w oflox ear drops    #Right renal mass  -UA ordered to r/o microscopic hematuria   -CT chest showed right renal soft tissue mass 1.5cm  -CT abdomen performed in 9/2019 did not show any mass in kidneys  -MRI cannot be ordered due to pacemaker  -Consider urology consult if planning on any intervention    #Compensated HFpEF  -No signs of volume overload  -Chest x ray showed no congestion and physical exam showed no crackles  -c/w lasix, metoprolol, statin    #Hypertension  -c/w Lasix and metoprolol    #CKD stage 4  -Stable  -Avoid nephrotoxic drugs    #DVT/PE  -c/w eliquis    #Diet: DASH/TLC  #GI ppx: not indicated  #DVT ppx: Eliquis  #Dispo: acute

## 2020-02-07 NOTE — ED PROVIDER NOTE - OBJECTIVE STATEMENT
84yF with PMH htn, ckd4, dvt/pe on eliquis, chf, arrhythmia s/p ppm, p/w sob this am a/w pleuritic cp. pt with nonproductive cough and uri sx past couple days. diagnosed with external otitis and started using antibiotic drops yesterday. now reporting cp radiating from L ear to midsternum, constant, sudden onset, moderate severity now tapering off. no hemoptysis leg swelling history of recent surgery, cancer or hormone use. no n/v/d abd pain.

## 2020-02-07 NOTE — ED PROVIDER NOTE - ATTENDING CONTRIBUTION TO CARE
85 yo f with pmh of dvt, pe on eliquis, cad, chf, ppm, presents with 2-3 days of cough, productive, sore throat, and ear pain.  pt also had substernal chest pain and heaviness today.  also now c/o posterior L chest pain, worse on coughing.  pt says sputum is yellow.  was seen by a home visiting doctor and rx drops for her ears.  denies fever, no chills.  no hemoptysis, no leg swelling.  exam: nad, ncat, perrl, eomi, mmm, rrr, dec bs L base, abd soft, nt,nd aox3, no pitting edema imp: pt with h/o cad, chf, now with productive cough and uri sx, lung exam abnormal and cxr's left lung base obscured, will ct for further eval.  also r/o acs, ekg, labs

## 2020-02-07 NOTE — H&P ADULT - HISTORY OF PRESENT ILLNESS
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 Pain lasted for few hours, present in midsternal area, increased on deep breathing. Also associated with shortness of breath, cough. Patient currently c/o lower back pain on left side, which is non radiating and spasmodic in nature. No heavy lifting or blunt trauma to the area. Patient has left ear pain that started yesterday and was prescribed unknown ear drops for otitis externa.   In ED, /65mm Hg, HR 75/min, Afebrile, saturating well on RA. Labs showed WBC 10.9, BNP 7952, Chest x ray and CT chest showed no consolidation or effusions. Troponin x1 negative. EKG showed no acute ischemic changes, paced rhythm.

## 2020-02-07 NOTE — ED PROVIDER NOTE - PHYSICAL EXAMINATION
Vital Signs: I have reviewed the initial vital signs.  Constitutional: NAD, elderly, appears stated age, no acute distress.  HEENT: Airway patent, moist MM, no erythema/swelling/deformity of oral structures. EOMI, PERRLA. external ear canal erythematous BL. TMs clear.  CV: regular rate, regular rhythm, well-perfused extremities, 2+ b/l DP and radial pulses equal.  Lungs: BL scant rales, dependent crackles. no increased WOB.  ABD: NTND, no guarding or rebound, no pulsatile mass, no hernias.   MSK: Neck supple, nontender, nl ROM, no stepoff. Chest nontender. Back nontender in TLS spine or to b/l bony structures or flanks. Ext nontender, nl rom, no deformity.   INTEG: Skin warm, dry, no rash.  NEURO: alert, moving all extremities, normal speech  PSYCH: Calm, cooperative, normal affect and interaction.

## 2020-02-07 NOTE — H&P ADULT - NSHPPHYSICALEXAM_GEN_ALL_CORE
PHYSICAL EXAM:  GENERAL: NAD, lying in bed comfortably  HEAD:  Atraumatic, Normocephalic  EYES: EOMI, PERRLA, conjunctiva and sclera clear  ENT: Moist mucous membranes  NECK: Supple, No JVD  CHEST/LUNG: Clear to auscultation bilaterally; No rales, rhonchi, wheezing, or rubs. Unlabored respirations  HEART: Regular rate and rhythm; No murmurs, rubs, or gallops  ABDOMEN: Bowel sounds present; Soft, Tenderness + in Lower abdomen, Nondistended. No hepatomegaly  EXTREMITIES:  2+ Peripheral Pulses, brisk capillary refill. No clubbing, cyanosis, or edema  NERVOUS SYSTEM:  Alert & Oriented X3, speech clear. No deficits   MSK: FROM all 4 extremities, full and equal strength  SKIN: No rashes or lesions

## 2020-02-07 NOTE — H&P ADULT - NSICDXPASTMEDICALHX_GEN_ALL_CORE_FT
PAST MEDICAL HISTORY:  Atrial fibrillation     Chronic kidney disease     Diastolic heart failure     Diverticulitis sigmoid    Gout     Hypertension     Pacemaker

## 2020-02-07 NOTE — ED PROVIDER NOTE - CLINICAL SUMMARY MEDICAL DECISION MAKING FREE TEXT BOX
Pt with h/o of cad, here with chest pain and sob, labs and imaging in ED reassruing, will admit to tele for further eval and management

## 2020-02-07 NOTE — ED PROVIDER NOTE - NS ED ROS FT
Eyes:  No visual changes, eye pain or discharge.  ENMT:  No hearing changes, + ear pain, +sore throat +runny nose, no difficulty swallowing  Cardiac:  +chest pain, SOB . no edema.   Respiratory:  +cough   GI:  No nausea, vomiting, diarrhea or abdominal pain.  :  No dysuria, frequency or burning.  MS:  +myalgia, muscle weakness, no joint pain or back pain.  Neuro:  No headache +weakness.  No LOC.  Skin:  No skin rash.   Endocrine: No history of thyroid disease or diabetes.

## 2020-02-07 NOTE — H&P ADULT - ATTENDING COMMENTS
pt seen and examined indep, I have read and agree with above exam and poa,    sore throat, pleuritic chest pain,sob,      cxr noted,    tele  r/o rvp/rsv panel  supp care  2decho    continue current tx

## 2020-02-07 NOTE — ED ADULT NURSE NOTE - OBJECTIVE STATEMENT
patient c/o b/l ear pain radiating to the chest and back, patient states pain started this morning, denies SOB, denies dizziness, denies sick contact. Patient has hx of HTN, high cholesterol , Depression.

## 2020-02-08 LAB
ALBUMIN SERPL ELPH-MCNC: 3.5 G/DL — SIGNIFICANT CHANGE UP (ref 3.5–5.2)
ALP SERPL-CCNC: 108 U/L — SIGNIFICANT CHANGE UP (ref 30–115)
ALT FLD-CCNC: 9 U/L — SIGNIFICANT CHANGE UP (ref 0–41)
ANION GAP SERPL CALC-SCNC: 16 MMOL/L — HIGH (ref 7–14)
AST SERPL-CCNC: 21 U/L — SIGNIFICANT CHANGE UP (ref 0–41)
BILIRUB SERPL-MCNC: 0.4 MG/DL — SIGNIFICANT CHANGE UP (ref 0.2–1.2)
BUN SERPL-MCNC: 52 MG/DL — HIGH (ref 10–20)
CALCIUM SERPL-MCNC: 9 MG/DL — SIGNIFICANT CHANGE UP (ref 8.5–10.1)
CHLORIDE SERPL-SCNC: 105 MMOL/L — SIGNIFICANT CHANGE UP (ref 98–110)
CO2 SERPL-SCNC: 20 MMOL/L — SIGNIFICANT CHANGE UP (ref 17–32)
CREAT SERPL-MCNC: 2.3 MG/DL — HIGH (ref 0.7–1.5)
GLUCOSE SERPL-MCNC: 76 MG/DL — SIGNIFICANT CHANGE UP (ref 70–99)
HCT VFR BLD CALC: 33.1 % — LOW (ref 37–47)
HGB BLD-MCNC: 10.3 G/DL — LOW (ref 12–16)
MAGNESIUM SERPL-MCNC: 2 MG/DL — SIGNIFICANT CHANGE UP (ref 1.8–2.4)
MCHC RBC-ENTMCNC: 29.2 PG — SIGNIFICANT CHANGE UP (ref 27–31)
MCHC RBC-ENTMCNC: 31.1 G/DL — LOW (ref 32–37)
MCV RBC AUTO: 93.8 FL — SIGNIFICANT CHANGE UP (ref 81–99)
NRBC # BLD: 0 /100 WBCS — SIGNIFICANT CHANGE UP (ref 0–0)
PLATELET # BLD AUTO: 196 K/UL — SIGNIFICANT CHANGE UP (ref 130–400)
POTASSIUM SERPL-MCNC: 4.8 MMOL/L — SIGNIFICANT CHANGE UP (ref 3.5–5)
POTASSIUM SERPL-SCNC: 4.8 MMOL/L — SIGNIFICANT CHANGE UP (ref 3.5–5)
PROT SERPL-MCNC: 6.3 G/DL — SIGNIFICANT CHANGE UP (ref 6–8)
RBC # BLD: 3.53 M/UL — LOW (ref 4.2–5.4)
RBC # FLD: 12.9 % — SIGNIFICANT CHANGE UP (ref 11.5–14.5)
SODIUM SERPL-SCNC: 141 MMOL/L — SIGNIFICANT CHANGE UP (ref 135–146)
TROPONIN T SERPL-MCNC: 0.02 NG/ML — HIGH
WBC # BLD: 8.99 K/UL — SIGNIFICANT CHANGE UP (ref 4.8–10.8)
WBC # FLD AUTO: 8.99 K/UL — SIGNIFICANT CHANGE UP (ref 4.8–10.8)

## 2020-02-08 RX ORDER — ACETAMINOPHEN 500 MG
650 TABLET ORAL EVERY 6 HOURS
Refills: 0 | Status: DISCONTINUED | OUTPATIENT
Start: 2020-02-08 | End: 2020-02-11

## 2020-02-08 RX ADMIN — Medication 40 MILLIGRAM(S): at 05:14

## 2020-02-08 RX ADMIN — Medication 1 DROP(S): at 05:15

## 2020-02-08 RX ADMIN — Medication 1 SPRAY(S): at 05:16

## 2020-02-08 RX ADMIN — CALCITRIOL 0.25 MICROGRAM(S): 0.5 CAPSULE ORAL at 12:12

## 2020-02-08 RX ADMIN — Medication 1 SPRAY(S): at 17:10

## 2020-02-08 RX ADMIN — APIXABAN 2.5 MILLIGRAM(S): 2.5 TABLET, FILM COATED ORAL at 05:15

## 2020-02-08 RX ADMIN — Medication 650 MILLIGRAM(S): at 20:31

## 2020-02-08 RX ADMIN — Medication 25 MILLIGRAM(S): at 05:15

## 2020-02-08 RX ADMIN — Medication 1 DROP(S): at 17:10

## 2020-02-08 RX ADMIN — ATORVASTATIN CALCIUM 80 MILLIGRAM(S): 80 TABLET, FILM COATED ORAL at 21:57

## 2020-02-08 RX ADMIN — SERTRALINE 50 MILLIGRAM(S): 25 TABLET, FILM COATED ORAL at 12:19

## 2020-02-08 RX ADMIN — Medication 650 MILLIGRAM(S): at 21:25

## 2020-02-08 RX ADMIN — APIXABAN 2.5 MILLIGRAM(S): 2.5 TABLET, FILM COATED ORAL at 17:09

## 2020-02-08 RX ADMIN — PANTOPRAZOLE SODIUM 40 MILLIGRAM(S): 20 TABLET, DELAYED RELEASE ORAL at 06:04

## 2020-02-09 LAB
ALBUMIN SERPL ELPH-MCNC: 3.6 G/DL — SIGNIFICANT CHANGE UP (ref 3.5–5.2)
ALP SERPL-CCNC: 110 U/L — SIGNIFICANT CHANGE UP (ref 30–115)
ALT FLD-CCNC: 10 U/L — SIGNIFICANT CHANGE UP (ref 0–41)
ANION GAP SERPL CALC-SCNC: 13 MMOL/L — SIGNIFICANT CHANGE UP (ref 7–14)
AST SERPL-CCNC: 24 U/L — SIGNIFICANT CHANGE UP (ref 0–41)
BASOPHILS # BLD AUTO: 0.03 K/UL — SIGNIFICANT CHANGE UP (ref 0–0.2)
BASOPHILS NFR BLD AUTO: 0.3 % — SIGNIFICANT CHANGE UP (ref 0–1)
BILIRUB SERPL-MCNC: 1.2 MG/DL — SIGNIFICANT CHANGE UP (ref 0.2–1.2)
BUN SERPL-MCNC: 47 MG/DL — HIGH (ref 10–20)
CALCIUM SERPL-MCNC: 9.2 MG/DL — SIGNIFICANT CHANGE UP (ref 8.5–10.1)
CHLORIDE SERPL-SCNC: 100 MMOL/L — SIGNIFICANT CHANGE UP (ref 98–110)
CO2 SERPL-SCNC: 24 MMOL/L — SIGNIFICANT CHANGE UP (ref 17–32)
CREAT SERPL-MCNC: 2.4 MG/DL — HIGH (ref 0.7–1.5)
EOSINOPHIL # BLD AUTO: 0.27 K/UL — SIGNIFICANT CHANGE UP (ref 0–0.7)
EOSINOPHIL NFR BLD AUTO: 3 % — SIGNIFICANT CHANGE UP (ref 0–8)
GLUCOSE SERPL-MCNC: 106 MG/DL — HIGH (ref 70–99)
HCT VFR BLD CALC: 34.1 % — LOW (ref 37–47)
HGB BLD-MCNC: 10.8 G/DL — LOW (ref 12–16)
IMM GRANULOCYTES NFR BLD AUTO: 0.4 % — HIGH (ref 0.1–0.3)
LYMPHOCYTES # BLD AUTO: 1.93 K/UL — SIGNIFICANT CHANGE UP (ref 1.2–3.4)
LYMPHOCYTES # BLD AUTO: 21.2 % — SIGNIFICANT CHANGE UP (ref 20.5–51.1)
MCHC RBC-ENTMCNC: 29.5 PG — SIGNIFICANT CHANGE UP (ref 27–31)
MCHC RBC-ENTMCNC: 31.7 G/DL — LOW (ref 32–37)
MCV RBC AUTO: 93.2 FL — SIGNIFICANT CHANGE UP (ref 81–99)
MONOCYTES # BLD AUTO: 1.5 K/UL — HIGH (ref 0.1–0.6)
MONOCYTES NFR BLD AUTO: 16.5 % — HIGH (ref 1.7–9.3)
NEUTROPHILS # BLD AUTO: 5.33 K/UL — SIGNIFICANT CHANGE UP (ref 1.4–6.5)
NEUTROPHILS NFR BLD AUTO: 58.6 % — SIGNIFICANT CHANGE UP (ref 42.2–75.2)
NRBC # BLD: 0 /100 WBCS — SIGNIFICANT CHANGE UP (ref 0–0)
PLATELET # BLD AUTO: 220 K/UL — SIGNIFICANT CHANGE UP (ref 130–400)
POTASSIUM SERPL-MCNC: 5.1 MMOL/L — HIGH (ref 3.5–5)
POTASSIUM SERPL-SCNC: 5.1 MMOL/L — HIGH (ref 3.5–5)
PROT SERPL-MCNC: 6.6 G/DL — SIGNIFICANT CHANGE UP (ref 6–8)
RBC # BLD: 3.66 M/UL — LOW (ref 4.2–5.4)
RBC # FLD: 12.6 % — SIGNIFICANT CHANGE UP (ref 11.5–14.5)
SODIUM SERPL-SCNC: 137 MMOL/L — SIGNIFICANT CHANGE UP (ref 135–146)
WBC # BLD: 9.1 K/UL — SIGNIFICANT CHANGE UP (ref 4.8–10.8)
WBC # FLD AUTO: 9.1 K/UL — SIGNIFICANT CHANGE UP (ref 4.8–10.8)

## 2020-02-09 PROCEDURE — 93306 TTE W/DOPPLER COMPLETE: CPT | Mod: 26

## 2020-02-09 RX ORDER — BENZOCAINE AND MENTHOL 5; 1 G/100ML; G/100ML
1 LIQUID ORAL EVERY 4 HOURS
Refills: 0 | Status: DISCONTINUED | OUTPATIENT
Start: 2020-02-09 | End: 2020-02-09

## 2020-02-09 RX ADMIN — APIXABAN 2.5 MILLIGRAM(S): 2.5 TABLET, FILM COATED ORAL at 05:52

## 2020-02-09 RX ADMIN — Medication 1 SPRAY(S): at 17:33

## 2020-02-09 RX ADMIN — Medication 1 DROP(S): at 17:33

## 2020-02-09 RX ADMIN — APIXABAN 2.5 MILLIGRAM(S): 2.5 TABLET, FILM COATED ORAL at 17:33

## 2020-02-09 RX ADMIN — Medication 25 MILLIGRAM(S): at 05:51

## 2020-02-09 RX ADMIN — SERTRALINE 50 MILLIGRAM(S): 25 TABLET, FILM COATED ORAL at 12:28

## 2020-02-09 RX ADMIN — Medication 650 MILLIGRAM(S): at 21:47

## 2020-02-09 RX ADMIN — CALCITRIOL 0.25 MICROGRAM(S): 0.5 CAPSULE ORAL at 12:28

## 2020-02-09 RX ADMIN — Medication 1 DROP(S): at 05:58

## 2020-02-09 RX ADMIN — Medication 650 MILLIGRAM(S): at 20:07

## 2020-02-09 RX ADMIN — PANTOPRAZOLE SODIUM 40 MILLIGRAM(S): 20 TABLET, DELAYED RELEASE ORAL at 05:52

## 2020-02-09 RX ADMIN — ATORVASTATIN CALCIUM 80 MILLIGRAM(S): 80 TABLET, FILM COATED ORAL at 21:47

## 2020-02-09 RX ADMIN — Medication 1 SPRAY(S): at 05:53

## 2020-02-09 RX ADMIN — Medication 40 MILLIGRAM(S): at 05:51

## 2020-02-09 NOTE — PROGRESS NOTE ADULT - ASSESSMENT
viral uri  pleuritic chest pain  chr diast chf  ckd4  htn  right renal mass     fu rvp/rsv panels  fu 2decho  continue current tx  gu fu

## 2020-02-10 ENCOUNTER — TRANSCRIPTION ENCOUNTER (OUTPATIENT)
Age: 85
End: 2020-02-10

## 2020-02-10 LAB
ALBUMIN SERPL ELPH-MCNC: 3.3 G/DL — LOW (ref 3.5–5.2)
ALP SERPL-CCNC: 99 U/L — SIGNIFICANT CHANGE UP (ref 30–115)
ALT FLD-CCNC: 9 U/L — SIGNIFICANT CHANGE UP (ref 0–41)
ANION GAP SERPL CALC-SCNC: 13 MMOL/L — SIGNIFICANT CHANGE UP (ref 7–14)
APPEARANCE UR: CLEAR — SIGNIFICANT CHANGE UP
AST SERPL-CCNC: 23 U/L — SIGNIFICANT CHANGE UP (ref 0–41)
BACTERIA # UR AUTO: NEGATIVE — SIGNIFICANT CHANGE UP
BASOPHILS # BLD AUTO: 0.03 K/UL — SIGNIFICANT CHANGE UP (ref 0–0.2)
BASOPHILS NFR BLD AUTO: 0.3 % — SIGNIFICANT CHANGE UP (ref 0–1)
BILIRUB SERPL-MCNC: 0.3 MG/DL — SIGNIFICANT CHANGE UP (ref 0.2–1.2)
BILIRUB UR-MCNC: NEGATIVE — SIGNIFICANT CHANGE UP
BUN SERPL-MCNC: 46 MG/DL — HIGH (ref 10–20)
CALCIUM SERPL-MCNC: 8.7 MG/DL — SIGNIFICANT CHANGE UP (ref 8.5–10.1)
CHLORIDE SERPL-SCNC: 100 MMOL/L — SIGNIFICANT CHANGE UP (ref 98–110)
CO2 SERPL-SCNC: 23 MMOL/L — SIGNIFICANT CHANGE UP (ref 17–32)
COLOR SPEC: SIGNIFICANT CHANGE UP
CREAT SERPL-MCNC: 2.3 MG/DL — HIGH (ref 0.7–1.5)
DIFF PNL FLD: NEGATIVE — SIGNIFICANT CHANGE UP
EOSINOPHIL # BLD AUTO: 0.39 K/UL — SIGNIFICANT CHANGE UP (ref 0–0.7)
EOSINOPHIL NFR BLD AUTO: 3.8 % — SIGNIFICANT CHANGE UP (ref 0–8)
EPI CELLS # UR: 2 /HPF — SIGNIFICANT CHANGE UP (ref 0–5)
GLUCOSE SERPL-MCNC: 106 MG/DL — HIGH (ref 70–99)
GLUCOSE UR QL: NEGATIVE — SIGNIFICANT CHANGE UP
HCT VFR BLD CALC: 32.1 % — LOW (ref 37–47)
HGB BLD-MCNC: 10.3 G/DL — LOW (ref 12–16)
HYALINE CASTS # UR AUTO: 2 /LPF — SIGNIFICANT CHANGE UP (ref 0–7)
IMM GRANULOCYTES NFR BLD AUTO: 0.5 % — HIGH (ref 0.1–0.3)
KETONES UR-MCNC: NEGATIVE — SIGNIFICANT CHANGE UP
LEUKOCYTE ESTERASE UR-ACNC: NEGATIVE — SIGNIFICANT CHANGE UP
LYMPHOCYTES # BLD AUTO: 2.58 K/UL — SIGNIFICANT CHANGE UP (ref 1.2–3.4)
LYMPHOCYTES # BLD AUTO: 25 % — SIGNIFICANT CHANGE UP (ref 20.5–51.1)
MAGNESIUM SERPL-MCNC: 1.8 MG/DL — SIGNIFICANT CHANGE UP (ref 1.8–2.4)
MCHC RBC-ENTMCNC: 30.1 PG — SIGNIFICANT CHANGE UP (ref 27–31)
MCHC RBC-ENTMCNC: 32.1 G/DL — SIGNIFICANT CHANGE UP (ref 32–37)
MCV RBC AUTO: 93.9 FL — SIGNIFICANT CHANGE UP (ref 81–99)
MONOCYTES # BLD AUTO: 1.74 K/UL — HIGH (ref 0.1–0.6)
MONOCYTES NFR BLD AUTO: 16.9 % — HIGH (ref 1.7–9.3)
NEUTROPHILS # BLD AUTO: 5.51 K/UL — SIGNIFICANT CHANGE UP (ref 1.4–6.5)
NEUTROPHILS NFR BLD AUTO: 53.5 % — SIGNIFICANT CHANGE UP (ref 42.2–75.2)
NITRITE UR-MCNC: NEGATIVE — SIGNIFICANT CHANGE UP
NRBC # BLD: 0 /100 WBCS — SIGNIFICANT CHANGE UP (ref 0–0)
PH UR: 6 — SIGNIFICANT CHANGE UP (ref 5–8)
PLATELET # BLD AUTO: 211 K/UL — SIGNIFICANT CHANGE UP (ref 130–400)
POTASSIUM SERPL-MCNC: 4.6 MMOL/L — SIGNIFICANT CHANGE UP (ref 3.5–5)
POTASSIUM SERPL-SCNC: 4.6 MMOL/L — SIGNIFICANT CHANGE UP (ref 3.5–5)
PROT SERPL-MCNC: 6.1 G/DL — SIGNIFICANT CHANGE UP (ref 6–8)
PROT UR-MCNC: ABNORMAL
RBC # BLD: 3.42 M/UL — LOW (ref 4.2–5.4)
RBC # FLD: 12.5 % — SIGNIFICANT CHANGE UP (ref 11.5–14.5)
RBC CASTS # UR COMP ASSIST: 2 /HPF — SIGNIFICANT CHANGE UP (ref 0–4)
SODIUM SERPL-SCNC: 136 MMOL/L — SIGNIFICANT CHANGE UP (ref 135–146)
SP GR SPEC: 1.01 — SIGNIFICANT CHANGE UP (ref 1.01–1.02)
UROBILINOGEN FLD QL: SIGNIFICANT CHANGE UP
WBC # BLD: 10.3 K/UL — SIGNIFICANT CHANGE UP (ref 4.8–10.8)
WBC # FLD AUTO: 10.3 K/UL — SIGNIFICANT CHANGE UP (ref 4.8–10.8)
WBC UR QL: 1 /HPF — SIGNIFICANT CHANGE UP (ref 0–5)

## 2020-02-10 RX ADMIN — CALCITRIOL 0.25 MICROGRAM(S): 0.5 CAPSULE ORAL at 12:20

## 2020-02-10 RX ADMIN — Medication 1 DROP(S): at 18:06

## 2020-02-10 RX ADMIN — Medication 1 SPRAY(S): at 18:07

## 2020-02-10 RX ADMIN — APIXABAN 2.5 MILLIGRAM(S): 2.5 TABLET, FILM COATED ORAL at 06:05

## 2020-02-10 RX ADMIN — Medication 1 DROP(S): at 06:05

## 2020-02-10 RX ADMIN — Medication 1 SPRAY(S): at 06:05

## 2020-02-10 RX ADMIN — ATORVASTATIN CALCIUM 80 MILLIGRAM(S): 80 TABLET, FILM COATED ORAL at 21:41

## 2020-02-10 RX ADMIN — Medication 25 MILLIGRAM(S): at 06:05

## 2020-02-10 RX ADMIN — SERTRALINE 50 MILLIGRAM(S): 25 TABLET, FILM COATED ORAL at 12:06

## 2020-02-10 RX ADMIN — Medication 40 MILLIGRAM(S): at 06:05

## 2020-02-10 RX ADMIN — APIXABAN 2.5 MILLIGRAM(S): 2.5 TABLET, FILM COATED ORAL at 18:07

## 2020-02-10 RX ADMIN — PANTOPRAZOLE SODIUM 40 MILLIGRAM(S): 20 TABLET, DELAYED RELEASE ORAL at 06:05

## 2020-02-10 NOTE — CONSULT NOTE ADULT - ASSESSMENT
Pulmonary Nodule  Cough  SOB resolved and Pleurisy resolved  Acute Bronchitis-viral  Pleurisy resolved  Renal Mass    repeat ct chest as out pt  obtain mri kidneys re renal mass  no need for abx from pulmonary stand point  ct negative for pe  dvt/gi px

## 2020-02-10 NOTE — DISCHARGE NOTE PROVIDER - NSFOLLOWUPCLINICS_GEN_ALL_ED_FT
[facility]  Medicine  [address], [place]   Phone: [phone]  Fax:   Follow Up Time: 1 week

## 2020-02-10 NOTE — CONSULT NOTE ADULT - SUBJECTIVE AND OBJECTIVE BOX
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 Pain lasted for few hours, present in midsternal area, increased on deep breathing. Also associated with shortness of breath, cough. Patient currently c/o lower back pain on left side, which is non radiating and spasmodic in nature. No heavy lifting or blunt trauma to the area. Patient has left ear pain that started yesterday and was prescribed unknown ear drops for otitis externa.   In ED, /65mm Hg, HR 75/min, Afebrile, saturating well on RA. Labs showed WBC 10.9, BNP 7952, Chest x ray and CT chest showed no consolidation or effusions. Troponin x1 negative. EKG showed no acute ischemic changes, paced rhythm. (07 Feb 2020 17:59)      PMH/PSH:  PAST MEDICAL & SURGICAL HISTORY:  Diastolic heart failure  Atrial fibrillation  Diverticulitis: sigmoid  Gout  Hypertension  Pacemaker  Chronic kidney disease  Artificial pacemaker    ALLERGIES:  Allergies    Keflex (Unknown)    Intolerances      SOCIAL HABITS:  negative x3    FAMILY HISTORY:   FAMILY HISTORY:  No pertinent family history in first degree relatives      REVIEW OF SYSTEM:  Elements of review of systems are negative or non-applicable except as noted above in HPI section.       HOME MEDICATIONS:  apixaban 2.5 mg oral tablet  atorvastatin 80 mg oral tablet  calcitriol 0.25 mcg oral capsule  furosemide 40 mg oral tablet  metoprolol succinate 25 mg oral tablet, extended release  sertraline 50 mg oral tablet    MEDICATIONS:  MEDICATIONS  (STANDING):  apixaban 2.5 milliGRAM(s) Oral two times a day  atorvastatin 80 milliGRAM(s) Oral at bedtime  calcitriol   Capsule 0.25 MICROGram(s) Oral daily  ciprofloxacin  0.3% Otic Solution 1 Drop(s) Both Ears two times a day  fluticasone propionate 50 MICROgram(s)/spray Nasal Spray 1 Spray(s) Both Nostrils two times a day  furosemide    Tablet 40 milliGRAM(s) Oral daily  metoprolol succinate ER 25 milliGRAM(s) Oral daily  pantoprazole    Tablet 40 milliGRAM(s) Oral before breakfast  sertraline 50 milliGRAM(s) Oral daily    MEDICATIONS  (PRN):  acetaminophen   Tablet .. 650 milliGRAM(s) Oral every 6 hours PRN Temp greater or equal to 38C (100.4F), Mild Pain (1 - 3)  methocarbamol 500 milliGRAM(s) Oral three times a day PRN severe pain        VITALS:   Vital Signs Last 24 Hrs  T(C): 37.4 (10 Feb 2020 14:06), Max: 39.1 (09 Feb 2020 20:22)  T(F): 99.4 (10 Feb 2020 14:06), Max: 102.3 (09 Feb 2020 20:22)  HR: 73 (10 Feb 2020 14:06) (67 - 85)  BP: 122/64 (10 Feb 2020 14:06) (122/64 - 169/87)  BP(mean): --  RR: 18 (10 Feb 2020 14:06) (18 - 18)  SpO2: --        PHYSICAL EXAM:    GENERAL: NAD  HEAD:  Atraumatic, Normocephalic  NECK: Supple, No JVD  CHEST/LUNG: Clear to auscultation bilaterally;   HEART: Regular rate and rhythm; No murmurs  ABDOMEN: Soft, Nontender, Nondistended  EXTREMITIES:  Good peripheral Pulses, No clubbing, cyanosis, or edema      LABS:                        10.3   10.30 )-----------( 211      ( 10 Feb 2020 05:33 )             32.1     02-10    136  |  100  |  46<H>  ----------------------------<  106<H>  4.6   |  23  |  2.3<H>    Ca    8.7      10 Feb 2020 05:33  Mg     1.8     02-10    TPro  6.1  /  Alb  3.3<L>  /  TBili  0.3  /  DBili  x   /  AST  23  /  ALT  9   /  AlkPhos  99  02-10    LIVER FUNCTIONS - ( 10 Feb 2020 05:33 )  Alb: 3.3 g/dL / Pro: 6.1 g/dL / ALK PHOS: 99 U/L / ALT: 9 U/L / AST: 23 U/L / GGT: x                         ABG & VENT SETTINGS (when applicable)        DIAGNOSTIC STUDIES:  < from: CT Chest No Cont (02.07.20 @ 16:07) >  IMPRESSION:    No acute intrathoracic abnormality.    3 mm solid nodule, right upper lobe. Consider CT noncontrast chest exam in 12 months for high risk patient as per Fleischner Society guidelines.    1.5 cm indeterminate soft tissue mass in the right renal midpole. Recommend MRI of the kidneys with and without intravenous contrast for further evaluation.    < end of copied text >

## 2020-02-10 NOTE — PROGRESS NOTE ADULT - ASSESSMENT
· Assessment		  viral uri  pleuritic chest pain  chr diast chf  ckd4  htn  right renal mass      dc home  continue current rx

## 2020-02-10 NOTE — DISCHARGE NOTE PROVIDER - NSDCFUADDAPPT_GEN_ALL_CORE_FT
Please Follow up with urology and endocrinology. To see doctors at [facility], please contact the medicine clinic as above.

## 2020-02-10 NOTE — CONSULT NOTE ADULT - SUBJECTIVE AND OBJECTIVE BOX
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 Pain lasted for few hours, present in midsternal area, increased on deep breathing. Also associated with shortness of breath, cough. Patient currently c/o lower back pain on left side, which is non radiating and spasmodic in nature. No heavy lifting or blunt trauma to the area. Patient has left ear pain that started yesterday and was prescribed unknown ear drops for otitis externa.   In ED, /65mm Hg, HR 75/min, Afebrile, saturating well on RA. Labs showed WBC 10.9, BNP 7952, Chest x ray and CT chest showed no consolidation or effusions. Troponin x1 negative. EKG showed no acute ischemic changes, paced rhythm. (2020 17:59)      PAST MEDICAL & SURGICAL HISTORY:  Diastolic heart failure  Atrial fibrillation  Diverticulitis: sigmoid  Gout  Hypertension  Pacemaker  Chronic kidney disease  Artificial pacemaker      Hospital Course: Likely viral URI  -Pleuritic chest pain and viral URI symptoms--CP resolved  -Trop stable at 0.02  -EKG no acute ischemic changes, atrial-sensed ventricular-paced rhythm  -CT chest and X ray chest normal, Duplex neg  -No signs of distress, hypoxia, hypotension  -Flu, RSV negative  --RVP Follow up   --Supportive care    #Right renal mass  -CT chest showed right renal soft tissue mass 1.5cm  -CT abdomen performed in 2019 did not show any mass in kidneys  -MRI cannot be ordered due to pacemaker  --Consider urology consult if planning on any intervention  --Urology outpatient Follow up     #Compensated HFpEF  -No signs of volume overload  -Chest x ray showed no congestion and physical exam showed no crackles  -ECHO here LVEF 40-45%, Grade 1 diastolic dysfunction  --c/w lasix, metoprolol, statin    #Back pain  -Lower back pain on left side with tenderness on palpation   -Likely muscle spasm  --Robaxin PRN    #Otitis externa Left  -c/w oflox ear drops (-    #Hypertension  --c/w Lasix and metoprolol    #CKD stage 4  -Stable  --Avoid nephrotoxic drugs    #DVT/PE    TODAY'S SUBJECTIVE & REVIEW OF SYMPTOMS:     Constitutional WNL   Cardio WNL   Resp WNL   GI WNL  Heme WNL  Endo WNL  Skin WNL  MSK Shahbaz knee /ankle pain  Neuro WNL  Cognitive WNL  Psych WNL      MEDICATIONS  (STANDING):  apixaban 2.5 milliGRAM(s) Oral two times a day  atorvastatin 80 milliGRAM(s) Oral at bedtime  calcitriol   Capsule 0.25 MICROGram(s) Oral daily  ciprofloxacin  0.3% Otic Solution 1 Drop(s) Both Ears two times a day  fluticasone propionate 50 MICROgram(s)/spray Nasal Spray 1 Spray(s) Both Nostrils two times a day  furosemide    Tablet 40 milliGRAM(s) Oral daily  metoprolol succinate ER 25 milliGRAM(s) Oral daily  pantoprazole    Tablet 40 milliGRAM(s) Oral before breakfast  sertraline 50 milliGRAM(s) Oral daily    MEDICATIONS  (PRN):  acetaminophen   Tablet .. 650 milliGRAM(s) Oral every 6 hours PRN Temp greater or equal to 38C (100.4F), Mild Pain (1 - 3)  methocarbamol 500 milliGRAM(s) Oral three times a day PRN severe pain      FAMILY HISTORY:  No pertinent family history in first degree relatives      Allergies    Keflex (Unknown)    Intolerances        SOCIAL HISTORY:    [    ] Etoh  [    ] Smoking  [    ] Substance abuse     Home Environment:  [    ] Home Alone  [ x   ] Lives with Family DAUGHTER  [   x ] Home Health Aid 8x7    Dwelling:  [  x  ] Apartment  [    ] Private House  [    ] Adult Home  [    ] Skilled Nursing Facility      [    ] Short Term  [    ] Long Term  [ x   ] Stairs        3 IN                   [    ] Elevator     FUNCTIONAL STATUS PTA: (Check all that apply)  Ambulation: [  x   ]Independent    [    ] Dependent     [    ] Non-Ambulatory  Assistive Device: [    ] SA Cane  [    ]  Q Cane  [  x  ] Walker  [    ]  Wheelchair  ADL : [    ] Independent  [  x  ]  Dependent       Vital Signs Last 24 Hrs  T(C): 37.4 (10 Feb 2020 14:06), Max: 39.1 (2020 20:22)  T(F): 99.4 (10 Feb 2020 14:06), Max: 102.3 (2020 20:22)  HR: 73 (10 Feb 2020 14:06) (67 - 85)  BP: 122/64 (10 Feb 2020 14:06) (122/64 - 169/87)  BP(mean): --  RR: 18 (10 Feb 2020 14:06) (18 - 18)  SpO2: --      PHYSICAL EXAM: Alert & Oriented X3 NO O2  GENERAL: NAD, well-groomed, well-developed  HEAD:  Atraumatic, Normocephalic  EYES: EOMI, PERRLA, conjunctiva and sclera clear  NECK: Supple  CHEST/LUNG: Clear bilaterally  HEART: Regular rate and rhythm  ABDOMEN: Soft, Nontender, Nondistended; Bowel sounds present  EXTREMITIES:  no calf tenderness,no edema BLES  Shahbaz knees /ankles with warmth no swelling tender to palpation    NERVOUS SYSTEM:  Cranial Nerves 2-12 intact [ x   ] Abnormal  [    ]  ROM: WFL all extremities [    ]  Abnormal [ x    ]limited ROM Knees  Motor Strength: WFL all extremities  [ x   ]  Abnormal [    ]4/5  Sensation: intact to light touch [  x  ] Abnormal [    ]      FUNCTIONAL STATUS:  Bed Mobility: [   ]  Independent [    ]  Supervision [  x  ]  Needs Assistance [  ]  N/A  Transfers: [    ]  Independent [    ]  Supervision [    ]  Needs Assistance [  x  ]  N/A    Ambulation:  [    ]  Independent [    ]  Supervision [    ]  Needs Assistance [  x  ]  N/A   ADL:  [    ]   Independent [ x   ] Requires Assistance [    ] N/A       LABS:                        10.3   10.30 )-----------( 211      ( 10 Feb 2020 05:33 )             32.1     02-10    136  |  100  |  46<H>  ----------------------------<  106<H>  4.6   |  23  |  2.3<H>    Ca    8.7      10 Feb 2020 05:33  Mg     1.8     02-10    TPro  6.1  /  Alb  3.3<L>  /  TBili  0.3  /  DBili  x   /  AST  23  /  ALT  9   /  AlkPhos  99  02-10      Urinalysis Basic - ( 10 Feb 2020 16:10 )    Color: Light Yellow / Appearance: Clear / S.011 / pH: x  Gluc: x / Ketone: Negative  / Bili: Negative / Urobili: <2 mg/dL   Blood: x / Protein: 30 mg/dL / Nitrite: Negative   Leuk Esterase: Negative / RBC: 2 /HPF / WBC 1 /HPF   Sq Epi: x / Non Sq Epi: 2 /HPF / Bacteria: Negative        RADIOLOGY & ADDITIONAL STUDIES:

## 2020-02-10 NOTE — CONSULT NOTE ADULT - ASSESSMENT
IMPRESSION: Rehab of gait ab/ arthritis ? gouty flare- Debilitation secondary to URI    PRECAUTIONS: [    ] Cardiac  [   x ] Respiratory  [    ] Seizures [    ] Contact Isolation  [    ] Droplet Isolation  [    ] Other    Weight Bearing Status:     RECOMMENDATION:    Out of Bed to Chair     DVT/Decubiti Prophylaxis    REHAB PLAN:     [  x   ] Bedside P/T 3-5 times a week   [     ] Bedside O/T  2-3 times a week   [     ] No Rehab Therapy Indicated   [     ]  Speech Therapy   Conditioning/ROM                                 ADL  Bed Mobility                                            Conditioning/ROM  Transfers                                                  Bed Mobility  Sitting /Standing Balance                      Transfers                                        Gait Training                                            Sitting/Standing Balance  Stair Training [   ]Applicable                 Home equipment Eval                                                                     Splinting  [   ] Only      GOALS:   ADL   [  x  ]   Independent         Transfers  [   x ] Independent            Ambulation  [   x ] Independent     [ x    ] With device                            [    ]  CG                                               [    ]  CG                                                    [     ] CG                            [    ] Min A                                          [    ] Min A                                                [     ] Min  A          DISCHARGE PLAN:   [     ]  Good candidate for Intensive Rehabilitation/Hospital based                                             Will tolerate 3hrs Intensive Rehab Daily                                       [      ]  Short Term Rehab in Skilled Nursing Facility                                       [  x    ]  Home with Outpatient or  services                                         [      ]  Possible Candidate for Intensive Hospital based Rehab

## 2020-02-10 NOTE — PROGRESS NOTE ADULT - ASSESSMENT
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 Chest pain workup was negative-trop neg, EKG ok, Chest X-Ray and CT Chest ok; no hypoxia.  chest pain currently resolved. C/o lower back pain on left side. Trop x1 negative, EKG showed paced rhythm.    ASSESSMENT AND PLAN:    #Chest pain  -Pleuritic chest pain and viral URI symptoms  -Currently chest pain resolved  -Trop x1 negative. f/u trops for 8pm  -EKG showed no acute ischemic changes, atrial sensed ventricular paced rhythm  -CT chest and X ray chest normal   -No signs of distress, hypoxia, hypotension  -Will order duplex LE to r/o DVT  -ECHO ordered    #Back pain  -Lower back pain on left side with tenderness on palpation   -Likely muscle spasm  -Robaxin ordered PRN    #Otitis externa  -c/w oflox ear drops    #Right renal mass  -UA ordered to r/o microscopic hematuria   -CT chest showed right renal soft tissue mass 1.5cm  -CT abdomen performed in 9/2019 did not show any mass in kidneys  -MRI cannot be ordered due to pacemaker  -Consider urology consult if planning on any intervention    #Compensated HFpEF  -No signs of volume overload  -Chest x ray showed no congestion and physical exam showed no crackles  -c/w lasix, metoprolol, statin    #Hypertension  -c/w Lasix and metoprolol    #CKD stage 4  -Stable  -Avoid nephrotoxic drugs    #DVT/PE  -c/w eliquis    #Diet: DASH/TLC  #GI ppx: not indicated  #DVT ppx: Eliquis  #Dispo: acute 84yF with PMH CKD stage 4, 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 EKG ok, Chest X-Ray and CT Chest ok, Duplex neg; no hypoxia. Symptoms likely secondary to viral URI.     #Likely viral URI  -Pleuritic chest pain and viral URI symptoms--CP resolved  -Trop stable at 0.02  -EKG no acute ischemic changes, atrial-sensed ventricular-paced rhythm  -CT chest and X ray chest normal, Duplex neg  -No signs of distress, hypoxia, hypotension  -Flu, RSV negative  --RVP Follow up   --Supportive care    #Right renal mass  -CT chest showed right renal soft tissue mass 1.5cm  -CT abdomen performed in 9/2019 did not show any mass in kidneys  -MRI cannot be ordered due to pacemaker  --Consider urology consult if planning on any intervention  --Urology outpatient Follow up     #Compensated HFpEF  -No signs of volume overload  -Chest x ray showed no congestion and physical exam showed no crackles  -ECHO here LVEF 40-45%, Grade 1 diastolic dysfunction  --c/w lasix, metoprolol, statin    #Back pain  -Lower back pain on left side with tenderness on palpation   -Likely muscle spasm  --Robaxin PRN    #Otitis externa Left  -c/w oflox ear drops (2/9-    #Hypertension  --c/w Lasix and metoprolol    #CKD stage 4  -Stable  --Avoid nephrotoxic drugs    #DVT/PE  --c/w eliquis    #Diet: DASH/TLC  #GI ppx: not indicated  #DVT ppx: Eliquis  #Dispo: acute

## 2020-02-10 NOTE — DISCHARGE NOTE PROVIDER - NSDCMRMEDTOKEN_GEN_ALL_CORE_FT
apixaban 2.5 mg oral tablet: 1 tab(s) orally 2 times a day  atorvastatin 80 mg oral tablet: 1 tab(s) orally once a day  calcitriol 0.25 mcg oral capsule: 1 cap(s) orally once a day  furosemide 40 mg oral tablet: 1 tab(s) orally once a day  metoprolol succinate 25 mg oral tablet, extended release: 1 tab(s) orally once a day  pantoprazole 40 mg oral delayed release tablet: 1 tab(s) orally once a day (before a meal)  sertraline 50 mg oral tablet: 1 tab(s) orally once a day apixaban 2.5 mg oral tablet: 1 tab(s) orally 2 times a day  atorvastatin 80 mg oral tablet: 1 tab(s) orally once a day  calcitriol 0.25 mcg oral capsule: 1 cap(s) orally once a day  ciprofloxacin 0.2% otic solution: 1 each in the left ear 2 times a day   furosemide 40 mg oral tablet: 1 tab(s) orally once a day  metoprolol succinate 25 mg oral tablet, extended release: 1 tab(s) orally once a day  pantoprazole 40 mg oral delayed release tablet: 1 tab(s) orally once a day (before a meal)  sertraline 50 mg oral tablet: 1 tab(s) orally once a day

## 2020-02-10 NOTE — DISCHARGE NOTE PROVIDER - PROVIDER TOKENS
PROVIDER:[TOKEN:[[id number]],FOLLOWUP:[1 week]],PROVIDER:[TOKEN:[[id number]],FOLLOWUP:[1 week]]

## 2020-02-10 NOTE — DISCHARGE NOTE PROVIDER - HOSPITAL COURSE
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 EKG ok, Chest X-Ray and CT Chest ok, Duplex neg; no hypoxia. Symptoms likely secondary to viral URI. Flu/RSV negative.    Patient also found to have 1.5 cm renal mass on CT chest, which was not present on CT abdomen 9/2019; also found to have 3 mm solid nodule in the RUL; also found to have 2 cm R thyroid lobe hypodense nodule; requires outpatient Follow up.     She is hemodynamically stable and ready for discharge.

## 2020-02-10 NOTE — PHYSICAL THERAPY INITIAL EVALUATION ADULT - LEVEL OF INDEPENDENCE: SIT/STAND, REHAB EVAL
maximum assist (25% patients effort)/Unable to sustain standing position due to limited strength and ROM at B knees.

## 2020-02-10 NOTE — PROGRESS NOTE ADULT - SUBJECTIVE AND OBJECTIVE BOX
RACHEL SUMMERS 84y Female  MRN#: [id number]     SUBJECTIVE  Patient is a 84y old Female who presents with a chief complaint of Chest pain (09 Feb 2020 15:44)      Today is hospital day 3d, and this morning she is lying in bed without distress. Reports that she is not feeling better. Ongoing cough with yellow sputum. No chest pain, shortness of breath, palpitations. Endorses dysuria and bilateral leg (knees and ankles) pain when asked. No abdominal pain.   No acute overnight events.     OBJECTIVE  PAST MEDICAL & SURGICAL HISTORY  Diastolic heart failure  Atrial fibrillation  Diverticulitis: sigmoid  Gout  Hypertension  Pacemaker  Chronic kidney disease  Artificial pacemaker    ALLERGIES:  Keflex (Unknown)    MEDICATIONS:  STANDING MEDICATIONS  apixaban 2.5 milliGRAM(s) Oral two times a day  atorvastatin 80 milliGRAM(s) Oral at bedtime  calcitriol   Capsule 0.25 MICROGram(s) Oral daily  ciprofloxacin  0.3% Otic Solution 1 Drop(s) Both Ears two times a day  fluticasone propionate 50 MICROgram(s)/spray Nasal Spray 1 Spray(s) Both Nostrils two times a day  furosemide    Tablet 40 milliGRAM(s) Oral daily  metoprolol succinate ER 25 milliGRAM(s) Oral daily  pantoprazole    Tablet 40 milliGRAM(s) Oral before breakfast  sertraline 50 milliGRAM(s) Oral daily    PRN MEDICATIONS  acetaminophen   Tablet .. 650 milliGRAM(s) Oral every 6 hours PRN  methocarbamol 500 milliGRAM(s) Oral three times a day PRN    HOME MEDICATIONS  Home Medications:  apixaban 2.5 mg oral tablet: 1 tab(s) orally 2 times a day (07 Feb 2020 18:05)  atorvastatin 80 mg oral tablet: 1 tab(s) orally once a day (07 Feb 2020 18:05)  calcitriol 0.25 mcg oral capsule: 1 cap(s) orally once a day (07 Feb 2020 18:05)  furosemide 40 mg oral tablet: 1 tab(s) orally once a day (07 Feb 2020 18:05)  metoprolol succinate 25 mg oral tablet, extended release: 1 tab(s) orally once a day (07 Feb 2020 18:05)  sertraline 50 mg oral tablet: 1 tab(s) orally once a day (07 Feb 2020 18:05)      LABS:                        10.3   10.30 )-----------( 211      ( 10 Feb 2020 05:33 )             32.1     02-10    136  |  100  |  46<H>  ----------------------------<  106<H>  4.6   |  23  |  2.3<H>    Ca    8.7      10 Feb 2020 05:33  Mg     1.8     02-10    TPro  6.1  /  Alb  3.3<L>  /  TBili  0.3  /  DBili  x   /  AST  23  /  ALT  9   /  AlkPhos  99  02-10    LIVER FUNCTIONS - ( 10 Feb 2020 05:33 )  Alb: 3.3 g/dL / Pro: 6.1 g/dL / ALK PHOS: 99 U/L / ALT: 9 U/L / AST: 23 U/L / GGT: x                 PHYSICAL EXAM:  T(C): 36.7 (02-10-20 @ 06:10), Max: 39.1 (02-09-20 @ 20:22)  HR: 67 (02-10-20 @ 05:57) (67 - 85)  BP: 132/63 (02-10-20 @ 05:57) (132/63 - 169/87)  RR: 18 (02-10-20 @ 05:57) (18 - 18)  SpO2: --    GENERAL: NAD, thin 84y lying in bed, awake  EENT: EOMI, conjunctiva and sclera clear, No nasal obstruction or discharge  RESPIRATORY: Clear to auscultation bilaterally; No wheeze or crackles; on room air; +cough  CARDIOVASCULAR: Regular rate and rhythm; No murmurs, rubs, or gallops, no pitting edema  GASTROINTESTINAL: Abdomen Soft, Nontender, Nondistended  MUSCULOSKELETAL:  No cyanosis, extremities grossly symmetrical, no edema or erythema of knees, ankles bilaterally  PSYCH: AAO, affect appropriate  NEUROLOGY: non-focal, cognition grossly intact, MAEE    ADMISSION SUMMARY  Patient is a 84y old Female who presents with a chief complaint of Chest pain (09 Feb 2020 15:44) RACHEL SUMMERS 84y Female  MRN#: [id number]     SUBJECTIVE  Patient is a 84y old Female who presents with a chief complaint of Chest pain (09 Feb 2020 15:44)      Today is hospital day 3d, and this morning she is lying in bed without distress. Reports that she is not feeling better. Her room is too cold for her. Ongoing cough with yellow sputum. No chest pain, shortness of breath, palpitations. Endorses dysuria and bilateral leg (knees and ankles) pain when asked. No abdominal pain.   No acute overnight events.     OBJECTIVE  PAST MEDICAL & SURGICAL HISTORY  Diastolic heart failure  Atrial fibrillation  Diverticulitis: sigmoid  Gout  Hypertension  Pacemaker  Chronic kidney disease  Artificial pacemaker    ALLERGIES:  Keflex (Unknown)    MEDICATIONS:  STANDING MEDICATIONS  apixaban 2.5 milliGRAM(s) Oral two times a day  atorvastatin 80 milliGRAM(s) Oral at bedtime  calcitriol   Capsule 0.25 MICROGram(s) Oral daily  ciprofloxacin  0.3% Otic Solution 1 Drop(s) Both Ears two times a day  fluticasone propionate 50 MICROgram(s)/spray Nasal Spray 1 Spray(s) Both Nostrils two times a day  furosemide    Tablet 40 milliGRAM(s) Oral daily  metoprolol succinate ER 25 milliGRAM(s) Oral daily  pantoprazole    Tablet 40 milliGRAM(s) Oral before breakfast  sertraline 50 milliGRAM(s) Oral daily    PRN MEDICATIONS  acetaminophen   Tablet .. 650 milliGRAM(s) Oral every 6 hours PRN  methocarbamol 500 milliGRAM(s) Oral three times a day PRN    HOME MEDICATIONS  Home Medications:  apixaban 2.5 mg oral tablet: 1 tab(s) orally 2 times a day (07 Feb 2020 18:05)  atorvastatin 80 mg oral tablet: 1 tab(s) orally once a day (07 Feb 2020 18:05)  calcitriol 0.25 mcg oral capsule: 1 cap(s) orally once a day (07 Feb 2020 18:05)  furosemide 40 mg oral tablet: 1 tab(s) orally once a day (07 Feb 2020 18:05)  metoprolol succinate 25 mg oral tablet, extended release: 1 tab(s) orally once a day (07 Feb 2020 18:05)  sertraline 50 mg oral tablet: 1 tab(s) orally once a day (07 Feb 2020 18:05)      LABS:                        10.3   10.30 )-----------( 211      ( 10 Feb 2020 05:33 )             32.1     02-10    136  |  100  |  46<H>  ----------------------------<  106<H>  4.6   |  23  |  2.3<H>    Ca    8.7      10 Feb 2020 05:33  Mg     1.8     02-10    TPro  6.1  /  Alb  3.3<L>  /  TBili  0.3  /  DBili  x   /  AST  23  /  ALT  9   /  AlkPhos  99  02-10    LIVER FUNCTIONS - ( 10 Feb 2020 05:33 )  Alb: 3.3 g/dL / Pro: 6.1 g/dL / ALK PHOS: 99 U/L / ALT: 9 U/L / AST: 23 U/L / GGT: x                 PHYSICAL EXAM:  T(C): 36.7 (02-10-20 @ 06:10), Max: 39.1 (02-09-20 @ 20:22)  HR: 67 (02-10-20 @ 05:57) (67 - 85)  BP: 132/63 (02-10-20 @ 05:57) (132/63 - 169/87)  RR: 18 (02-10-20 @ 05:57) (18 - 18)  SpO2: --    GENERAL: NAD, thin 84y lying in bed, awake  EENT: EOMI, conjunctiva and sclera clear, No nasal obstruction or discharge  RESPIRATORY: Clear to auscultation bilaterally; No wheeze or crackles; on room air; +cough  CARDIOVASCULAR: Regular rate and rhythm; No murmurs, rubs, or gallops, no pitting edema  GASTROINTESTINAL: Abdomen Soft, Nontender, Nondistended  MUSCULOSKELETAL:  No cyanosis, extremities grossly symmetrical, no edema or erythema of knees, ankles bilaterally  PSYCH: AAO, affect appropriate, frustrated  NEUROLOGY: non-focal, cognition grossly intact, MAEE    ADMISSION SUMMARY  Patient is a 84y old Female who presents with a chief complaint of Chest pain (09 Feb 2020 15:44)

## 2020-02-10 NOTE — DISCHARGE NOTE PROVIDER - NSDCCPCAREPLAN_GEN_ALL_CORE_FT
PRINCIPAL DISCHARGE DIAGNOSIS  Diagnosis: Acute URI  Assessment and Plan of Treatment: You came in with cough, shortness of breath, and chest pain. Your cardiac workup was negative. Your symptoms are likely due to a viral URI and your flu swab was negative.   -Please Follow up with your primary care provider      SECONDARY DISCHARGE DIAGNOSES  Diagnosis: Thyroid nodule  Assessment and Plan of Treatment: Your CT scan showed a 2 cm nodule in your thyroid.   -Please Follow up with endocrinology    Diagnosis: Nodule of right lung  Assessment and Plan of Treatment: Your CT scan showed a 3 mm nodule in the right upper lobe.  -Please Follow up with pulmonology    Diagnosis: Renal mass, right  Assessment and Plan of Treatment: The CT scan of your chest showed a new 1.5 cm mass in the right kidney.  -Please Follow up with urology to evaluate this

## 2020-02-10 NOTE — DISCHARGE NOTE NURSING/CASE MANAGEMENT/SOCIAL WORK - PATIENT PORTAL LINK FT
You can access the FollowMyHealth Patient Portal offered by [facility] by registering at the following website: [email or web address]. By joining [employer]’s FollowMyHealth portal, you will also be able to view your health information using other applications (apps) compatible with our system.

## 2020-02-10 NOTE — PHYSICAL THERAPY INITIAL EVALUATION ADULT - GENERAL OBSERVATIONS, REHAB EVAL
PT IE 1:30-2pm. Pt supine in NAD. +call bell, +bed alarm, +telemetry, c/o 6/10 pain at R elbow and B knees.

## 2020-02-10 NOTE — DISCHARGE NOTE PROVIDER - CARE PROVIDER_API CALL
Jenni Teague)  Internal Medicine  [address] [place] [address]  Phone: [phone]  Fax: [phone]  Follow Up Time: 1 week    Juanpablo Noyola)  Internal Medicine  [address] [place] [address]  Phone: [phone]  Fax: [phone]  Follow Up Time: 1 week

## 2020-02-10 NOTE — PROGRESS NOTE ADULT - SUBJECTIVE AND OBJECTIVE BOX
Patient was seen and examined. Spoke with RN. Chart reviewed.    No events overnight.  Vital Signs Last 24 Hrs  T(F): 99.4 (10 Feb 2020 14:06), Max: 102.3 (09 Feb 2020 20:22)  HR: 73 (10 Feb 2020 14:06) (67 - 85)  BP: 122/64 (10 Feb 2020 14:06) (122/64 - 169/87)  SpO2: --  MEDICATIONS  (STANDING):  apixaban 2.5 milliGRAM(s) Oral two times a day  atorvastatin 80 milliGRAM(s) Oral at bedtime  calcitriol   Capsule 0.25 MICROGram(s) Oral daily  ciprofloxacin  0.3% Otic Solution 1 Drop(s) Both Ears two times a day  fluticasone propionate 50 MICROgram(s)/spray Nasal Spray 1 Spray(s) Both Nostrils two times a day  furosemide    Tablet 40 milliGRAM(s) Oral daily  metoprolol succinate ER 25 milliGRAM(s) Oral daily  pantoprazole    Tablet 40 milliGRAM(s) Oral before breakfast  sertraline 50 milliGRAM(s) Oral daily    MEDICATIONS  (PRN):  acetaminophen   Tablet .. 650 milliGRAM(s) Oral every 6 hours PRN Temp greater or equal to 38C (100.4F), Mild Pain (1 - 3)  methocarbamol 500 milliGRAM(s) Oral three times a day PRN severe pain    Labs:                        10.3   10.30 )-----------( 211      ( 10 Feb 2020 05:33 )             32.1                         10.8   9.10  )-----------( 220      ( 09 Feb 2020 06:36 )             34.1     10 Feb 2020 05:33    136    |  100    |  46     ----------------------------<  106    4.6     |  23     |  2.3    09 Feb 2020 06:36    137    |  100    |  47     ----------------------------<  106    5.1     |  24     |  2.4      Ca    8.7        10 Feb 2020 05:33  Ca    9.2        09 Feb 2020 06:36  Mg     1.8       10 Feb 2020 05:33    TPro  6.1    /  Alb  3.3    /  TBili  0.3    /  DBili  x      /  AST  23     /  ALT  9      /  AlkPhos  99     10 Feb 2020 05:33  TPro  6.6    /  Alb  3.6    /  TBili  1.2    /  DBili  x      /  AST  24     /  ALT  10     /  AlkPhos  110    09 Feb 2020 06:36            Radiology:    General: comfortable, NAD  Neurology: A&Ox3, nonfocal  Head:  Normocephalic, atraumatic  ENT:  Mucosa moist, no ulcerations  Neck:  Supple, no JVD,   Skin: no breakdowns (as per RN)  Resp: CTA B/L  CV: RRR, S1S2,   GI: Soft, NT, bowel sounds  MS: No edema, + peripheral pulses, FROM all 4 extremity

## 2020-02-11 VITALS — OXYGEN SATURATION: 98 % | RESPIRATION RATE: 18 BRPM

## 2020-02-11 LAB
ANION GAP SERPL CALC-SCNC: 15 MMOL/L — HIGH (ref 7–14)
BUN SERPL-MCNC: 48 MG/DL — HIGH (ref 10–20)
CALCIUM SERPL-MCNC: 8.7 MG/DL — SIGNIFICANT CHANGE UP (ref 8.5–10.1)
CHLORIDE SERPL-SCNC: 100 MMOL/L — SIGNIFICANT CHANGE UP (ref 98–110)
CO2 SERPL-SCNC: 21 MMOL/L — SIGNIFICANT CHANGE UP (ref 17–32)
CREAT SERPL-MCNC: 2.3 MG/DL — HIGH (ref 0.7–1.5)
GLUCOSE SERPL-MCNC: 114 MG/DL — HIGH (ref 70–99)
MAGNESIUM SERPL-MCNC: 1.8 MG/DL — SIGNIFICANT CHANGE UP (ref 1.8–2.4)
PHOSPHATE SERPL-MCNC: 3.5 MG/DL — SIGNIFICANT CHANGE UP (ref 2.1–4.9)
POTASSIUM SERPL-MCNC: 5.1 MMOL/L — HIGH (ref 3.5–5)
POTASSIUM SERPL-SCNC: 5.1 MMOL/L — HIGH (ref 3.5–5)
RAPID RVP RESULT: SIGNIFICANT CHANGE UP
SODIUM SERPL-SCNC: 136 MMOL/L — SIGNIFICANT CHANGE UP (ref 135–146)

## 2020-02-11 RX ORDER — CIPROFLOXACIN 6 MG/ML
1 SUSPENSION INTRATYMPANIC
Qty: 6 | Refills: 0
Start: 2020-02-11 | End: 2020-02-13

## 2020-02-11 RX ADMIN — CALCITRIOL 0.25 MICROGRAM(S): 0.5 CAPSULE ORAL at 11:53

## 2020-02-11 RX ADMIN — SERTRALINE 50 MILLIGRAM(S): 25 TABLET, FILM COATED ORAL at 11:53

## 2020-02-11 RX ADMIN — Medication 1 SPRAY(S): at 06:00

## 2020-02-11 RX ADMIN — Medication 25 MILLIGRAM(S): at 05:58

## 2020-02-11 RX ADMIN — PANTOPRAZOLE SODIUM 40 MILLIGRAM(S): 20 TABLET, DELAYED RELEASE ORAL at 05:58

## 2020-02-11 RX ADMIN — Medication 40 MILLIGRAM(S): at 05:58

## 2020-02-11 RX ADMIN — APIXABAN 2.5 MILLIGRAM(S): 2.5 TABLET, FILM COATED ORAL at 05:58

## 2020-02-11 RX ADMIN — Medication 1 DROP(S): at 06:38

## 2020-02-11 NOTE — CHART NOTE - NSCHARTNOTEFT_GEN_A_CORE
<<<RESIDENT DISCHARGE NOTE>>>     RACHEL SUMMERS  MRN-[id number]    VITAL SIGNS:  T(F): 99.1 (02-11-20 @ 06:12), Max: 99.4 (02-10-20 @ 14:06)  HR: 81 (02-11-20 @ 06:12)  BP: 123/67 (02-11-20 @ 06:12)  SpO2: 98% (02-11-20 @ 08:27)      T(C): 37.3 (02-11-20 @ 06:12), Max: 37.4 (02-10-20 @ 14:06)  HR: 81 (02-11-20 @ 06:12) (72 - 81)  BP: 123/67 (02-11-20 @ 06:12) (122/64 - 168/84)  RR: 18 (02-11-20 @ 08:27) (18 - 18)  SpO2: 98% (02-11-20 @ 08:27) (98% - 98%)      GENERAL: NAD, thin 84y lying in bed, awake  EENT: EOMI, conjunctiva and sclera clear, No nasal obstruction or discharge  RESPIRATORY: Anterior fields clear to auscultation bilaterally; No wheeze or crackles; on room air;  no cough  CARDIOVASCULAR: Regular rate and rhythm; No murmurs, rubs, or gallops, no pitting edema  GASTROINTESTINAL: Abdomen Soft, Nontender, Nondistended  MUSCULOSKELETAL:  No cyanosis, extremities grossly symmetrical, no edema or erythema of knees, ankles bilaterally  PSYCH: AAO, affect appropriate, frustrated  NEUROLOGY: non-focal, cognition grossly intact, MAEE    TEST RESULTS:                        10.3   10.30 )-----------( 211      ( 10 Feb 2020 05:33 )             32.1       02-11    136  |  100  |  48<H>  ----------------------------<  114<H>  5.1<H>   |  21  |  2.3<H>    Ca    8.7      11 Feb 2020 06:27  Phos  3.5     02-11  Mg     1.8     02-11    TPro  6.1  /  Alb  3.3<L>  /  TBili  0.3  /  DBili  x   /  AST  23  /  ALT  9   /  AlkPhos  99  02-10      FINAL DISCHARGE INTERVIEW:  Resident(s) Present: (Name:_________Dimant____), RN Present: (Name:  ___________)    DISCHARGE MEDICATION RECONCILIATION  reviewed with Attending (Name:_______Haydre____)    DISPOSITION:   [  ] Home,    [ X ] Home with Visiting Nursing Services,   [    ]  SNF/ NH,    [   ] Acute Rehab (4A),   [   ] Other (Specify:_________)

## 2020-02-11 NOTE — PROGRESS NOTE ADULT - SUBJECTIVE AND OBJECTIVE BOX
Patient was seen and examined. Spoke with RN. Chart reviewed.  No complaints, doing well.  No events overnight.    Vital Signs Last 24 Hrs  T(F): 99.1 (2020 06:12), Max: 99.4 (10 Feb 2020 14:06)  HR: 81 (2020 06:12) (72 - 81)  BP: 123/67 (2020 06:12) (122/64 - 168/84)  SpO2: 98% (2020 08:27) (98% - 98%)  MEDICATIONS  (STANDING):  apixaban 2.5 milliGRAM(s) Oral two times a day  atorvastatin 80 milliGRAM(s) Oral at bedtime  calcitriol   Capsule 0.25 MICROGram(s) Oral daily  ciprofloxacin  0.3% Otic Solution 1 Drop(s) Both Ears two times a day  fluticasone propionate 50 MICROgram(s)/spray Nasal Spray 1 Spray(s) Both Nostrils two times a day  furosemide    Tablet 40 milliGRAM(s) Oral daily  metoprolol succinate ER 25 milliGRAM(s) Oral daily  pantoprazole    Tablet 40 milliGRAM(s) Oral before breakfast  sertraline 50 milliGRAM(s) Oral daily    MEDICATIONS  (PRN):  acetaminophen   Tablet .. 650 milliGRAM(s) Oral every 6 hours PRN Temp greater or equal to 38C (100.4F), Mild Pain (1 - 3)  methocarbamol 500 milliGRAM(s) Oral three times a day PRN severe pain    Labs:                        10.3   10.30 )-----------( 211      ( 10 Feb 2020 05:33 )             32.1     2020 06:27    136    |  100    |  48     ----------------------------<  114    5.1     |  21     |  2.3    10 Feb 2020 05:33    136    |  100    |  46     ----------------------------<  106    4.6     |  23     |  2.3      Ca    8.7        2020 06:27  Ca    8.7        10 Feb 2020 05:33  Phos  3.5       2020 06:27  Mg     1.8       2020 06:27  Mg     1.8       10 Feb 2020 05:33    TPro  6.1    /  Alb  3.3    /  TBili  0.3    /  DBili  x      /  AST  23     /  ALT  9      /  AlkPhos  99     10 Feb 2020 05:33      Urinalysis Basic - ( 10 Feb 2020 16:10 )    Color: Light Yellow / Appearance: Clear / S.011 / pH: x  Gluc: x / Ketone: Negative  / Bili: Negative / Urobili: <2 mg/dL   Blood: x / Protein: 30 mg/dL / Nitrite: Negative   Leuk Esterase: Negative / RBC: 2 /HPF / WBC 1 /HPF   Sq Epi: x / Non Sq Epi: 2 /HPF / Bacteria: Negative        General: Elderly F lying in bed, comfortable, NAD  Neurology: A&Ox3, nonfocal  Head:  Normocephalic, atraumatic  ENT:  Mucosa moist, no ulcerations  Neck:  Supple, no JVD,   Skin: no breakdowns (as per RN)  Resp: CTA B/L, no WRR  CV: RRR, S1S2, no MRG  GI: Soft, NT, bowel sounds  MS: No edema, + peripheral pulses, FROM all 4 extremity      A/P:  85 YO F with PMH CKD stage 4, 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 EKG, Chest X-Ray and CT Chest unremarkable, Duplex neg; no hypoxia. Symptoms likely secondary to viral URI. Flu/RSV negative.  Patient also found to have 1.5 cm renal mass on CT chest, which was not present on CT abdomen 2019; also found to have 3 mm solid nodule in the RUL; also found to have 2 cm R thyroid lobe hypodense nodule; requires outpatient Follow up.   She is hemodynamically stable and ready for discharge.     Assessment:   viral uri  pleuritic chest pain  chr diast chf  ckd4  htn  right renal mass    Plan:   dc home  continue home medications   FU Pulm as OP  repeat ct chest as out pt  will need urology FU and MRI for renal mass, as well as Endo FU for thyroid nodule, as above  DVT prophylaxis  Decubitus prevention- all measures as per RN protocol  Discussed with covering resident  Please call or text me with any questions or updates at [phone]

## 2020-02-13 ENCOUNTER — INPATIENT (INPATIENT)
Facility: HOSPITAL | Age: 85
LOS: 5 days | Discharge: NURSING, REHAB OR LONG-TERM CARE | End: 2020-02-19
Attending: INTERNAL MEDICINE | Admitting: INTERNAL MEDICINE
Payer: MEDICARE

## 2020-02-13 VITALS
DIASTOLIC BLOOD PRESSURE: 71 MMHG | SYSTOLIC BLOOD PRESSURE: 138 MMHG | OXYGEN SATURATION: 94 % | HEART RATE: 58 BPM | RESPIRATION RATE: 24 BRPM | TEMPERATURE: 99 F

## 2020-02-13 DIAGNOSIS — Z95.0 PRESENCE OF CARDIAC PACEMAKER: Chronic | ICD-10-CM

## 2020-02-13 LAB
ALBUMIN SERPL ELPH-MCNC: 3.4 G/DL — LOW (ref 3.5–5.2)
ALP SERPL-CCNC: 109 U/L — SIGNIFICANT CHANGE UP (ref 30–115)
ALT FLD-CCNC: 25 U/L — SIGNIFICANT CHANGE UP (ref 0–41)
ANION GAP SERPL CALC-SCNC: 16 MMOL/L — HIGH (ref 7–14)
AST SERPL-CCNC: 46 U/L — HIGH (ref 0–41)
BILIRUB SERPL-MCNC: 0.4 MG/DL — SIGNIFICANT CHANGE UP (ref 0.2–1.2)
BUN SERPL-MCNC: 61 MG/DL — CRITICAL HIGH (ref 10–20)
CALCIUM SERPL-MCNC: 9.1 MG/DL — SIGNIFICANT CHANGE UP (ref 8.5–10.1)
CHLORIDE SERPL-SCNC: 93 MMOL/L — LOW (ref 98–110)
CO2 SERPL-SCNC: 22 MMOL/L — SIGNIFICANT CHANGE UP (ref 17–32)
CREAT SERPL-MCNC: 3.2 MG/DL — HIGH (ref 0.7–1.5)
GLUCOSE SERPL-MCNC: 114 MG/DL — HIGH (ref 70–99)
HCT VFR BLD CALC: 33.2 % — LOW (ref 37–47)
HGB BLD-MCNC: 10.9 G/DL — LOW (ref 12–16)
MCHC RBC-ENTMCNC: 30.3 PG — SIGNIFICANT CHANGE UP (ref 27–31)
MCHC RBC-ENTMCNC: 32.8 G/DL — SIGNIFICANT CHANGE UP (ref 32–37)
MCV RBC AUTO: 92.2 FL — SIGNIFICANT CHANGE UP (ref 81–99)
NRBC # BLD: 0 /100 WBCS — SIGNIFICANT CHANGE UP (ref 0–0)
NT-PROBNP SERPL-SCNC: 5170 PG/ML — HIGH (ref 0–300)
PLATELET # BLD AUTO: 292 K/UL — SIGNIFICANT CHANGE UP (ref 130–400)
POTASSIUM SERPL-MCNC: 4.6 MMOL/L — SIGNIFICANT CHANGE UP (ref 3.5–5)
POTASSIUM SERPL-SCNC: 4.6 MMOL/L — SIGNIFICANT CHANGE UP (ref 3.5–5)
PROT SERPL-MCNC: 7.2 G/DL — SIGNIFICANT CHANGE UP (ref 6–8)
RBC # BLD: 3.6 M/UL — LOW (ref 4.2–5.4)
RBC # FLD: 12.4 % — SIGNIFICANT CHANGE UP (ref 11.5–14.5)
SODIUM SERPL-SCNC: 131 MMOL/L — LOW (ref 135–146)
TROPONIN T SERPL-MCNC: 0.03 NG/ML — CRITICAL HIGH
WBC # BLD: 12.34 K/UL — HIGH (ref 4.8–10.8)
WBC # FLD AUTO: 12.34 K/UL — HIGH (ref 4.8–10.8)

## 2020-02-13 PROCEDURE — 73100 X-RAY EXAM OF WRIST: CPT | Mod: 26,RT

## 2020-02-13 PROCEDURE — 93010 ELECTROCARDIOGRAM REPORT: CPT

## 2020-02-13 PROCEDURE — 99285 EMERGENCY DEPT VISIT HI MDM: CPT

## 2020-02-13 PROCEDURE — 71045 X-RAY EXAM CHEST 1 VIEW: CPT | Mod: 26

## 2020-02-13 RX ORDER — ASPIRIN/CALCIUM CARB/MAGNESIUM 324 MG
162 TABLET ORAL ONCE
Refills: 0 | Status: COMPLETED | OUTPATIENT
Start: 2020-02-13 | End: 2020-02-13

## 2020-02-13 RX ADMIN — Medication 50 MILLIGRAM(S): at 22:22

## 2020-02-13 RX ADMIN — Medication 162 MILLIGRAM(S): at 21:40

## 2020-02-13 NOTE — ED ADULT NURSE NOTE - CHPI ED NUR SYMPTOMS NEG
no nausea/no diaphoresis/no congestion/no vomiting/no chills/no syncope/no back pain/no dizziness/no fever

## 2020-02-13 NOTE — ED ADULT NURSE NOTE - NSIMPLEMENTINTERV_GEN_ALL_ED
Implemented All Universal Safety Interventions:  [place] to call system. Call bell, personal items and telephone within reach. Instruct patient to call for assistance. Room bathroom lighting operational. Non-slip footwear when patient is off stretcher. Physically safe environment: no spills, clutter or unnecessary equipment. Stretcher in lowest position, wheels locked, appropriate side rails in place.

## 2020-02-13 NOTE — ED ADULT NURSE NOTE - PMH
Atrial fibrillation    Chronic kidney disease    Diastolic heart failure    Diverticulitis  sigmoid  Gout    Hypertension    Pacemaker

## 2020-02-13 NOTE — ED ADULT NURSE NOTE - OBJECTIVE STATEMENT
pt 83 yo F pmHx stage 3 kidney failure, pacemaker, and CHF BIBA c/o L anterior cp x 1 hour associated w/ sob.

## 2020-02-14 DIAGNOSIS — Z90.89 ACQUIRED ABSENCE OF OTHER ORGANS: Chronic | ICD-10-CM

## 2020-02-14 DIAGNOSIS — Z90.710 ACQUIRED ABSENCE OF BOTH CERVIX AND UTERUS: Chronic | ICD-10-CM

## 2020-02-14 DIAGNOSIS — Z90.49 ACQUIRED ABSENCE OF OTHER SPECIFIED PARTS OF DIGESTIVE TRACT: Chronic | ICD-10-CM

## 2020-02-14 LAB
ALBUMIN SERPL ELPH-MCNC: 3.2 G/DL — LOW (ref 3.5–5.2)
ALP SERPL-CCNC: 95 U/L — SIGNIFICANT CHANGE UP (ref 30–115)
ALT FLD-CCNC: 22 U/L — SIGNIFICANT CHANGE UP (ref 0–41)
ANION GAP SERPL CALC-SCNC: 19 MMOL/L — HIGH (ref 7–14)
APTT BLD: 35.2 SEC — SIGNIFICANT CHANGE UP (ref 27–39.2)
AST SERPL-CCNC: 41 U/L — SIGNIFICANT CHANGE UP (ref 0–41)
BASOPHILS # BLD AUTO: 0.03 K/UL — SIGNIFICANT CHANGE UP (ref 0–0.2)
BASOPHILS NFR BLD AUTO: 0.3 % — SIGNIFICANT CHANGE UP (ref 0–1)
BILIRUB SERPL-MCNC: 0.3 MG/DL — SIGNIFICANT CHANGE UP (ref 0.2–1.2)
BLD GP AB SCN SERPL QL: SIGNIFICANT CHANGE UP
BUN SERPL-MCNC: 64 MG/DL — CRITICAL HIGH (ref 10–20)
CALCIUM SERPL-MCNC: 8.9 MG/DL — SIGNIFICANT CHANGE UP (ref 8.5–10.1)
CHLORIDE SERPL-SCNC: 96 MMOL/L — LOW (ref 98–110)
CK MB CFR SERPL CALC: 1.8 NG/ML — SIGNIFICANT CHANGE UP (ref 0.6–6.3)
CK SERPL-CCNC: 101 U/L — SIGNIFICANT CHANGE UP (ref 0–225)
CO2 SERPL-SCNC: 18 MMOL/L — SIGNIFICANT CHANGE UP (ref 17–32)
CREAT SERPL-MCNC: 2.8 MG/DL — HIGH (ref 0.7–1.5)
CRP SERPL-MCNC: 24.78 MG/DL — HIGH (ref 0–0.4)
EOSINOPHIL # BLD AUTO: 0 K/UL — SIGNIFICANT CHANGE UP (ref 0–0.7)
EOSINOPHIL NFR BLD AUTO: 0 % — SIGNIFICANT CHANGE UP (ref 0–8)
ERYTHROCYTE [SEDIMENTATION RATE] IN BLOOD: 126 MM/HR — HIGH (ref 0–20)
GLUCOSE SERPL-MCNC: 154 MG/DL — HIGH (ref 70–99)
HCT VFR BLD CALC: 29.9 % — LOW (ref 37–47)
HGB BLD-MCNC: 9.5 G/DL — LOW (ref 12–16)
IMM GRANULOCYTES NFR BLD AUTO: 0.8 % — HIGH (ref 0.1–0.3)
INR BLD: 1.56 RATIO — HIGH (ref 0.65–1.3)
LYMPHOCYTES # BLD AUTO: 0.98 K/UL — LOW (ref 1.2–3.4)
LYMPHOCYTES # BLD AUTO: 8.9 % — LOW (ref 20.5–51.1)
MAGNESIUM SERPL-MCNC: 2 MG/DL — SIGNIFICANT CHANGE UP (ref 1.8–2.4)
MCHC RBC-ENTMCNC: 29.1 PG — SIGNIFICANT CHANGE UP (ref 27–31)
MCHC RBC-ENTMCNC: 31.8 G/DL — LOW (ref 32–37)
MCV RBC AUTO: 91.4 FL — SIGNIFICANT CHANGE UP (ref 81–99)
MONOCYTES # BLD AUTO: 0.17 K/UL — SIGNIFICANT CHANGE UP (ref 0.1–0.6)
MONOCYTES NFR BLD AUTO: 1.5 % — LOW (ref 1.7–9.3)
NEUTROPHILS # BLD AUTO: 9.73 K/UL — HIGH (ref 1.4–6.5)
NEUTROPHILS NFR BLD AUTO: 88.5 % — HIGH (ref 42.2–75.2)
NRBC # BLD: 0 /100 WBCS — SIGNIFICANT CHANGE UP (ref 0–0)
PHOSPHATE SERPL-MCNC: 5.1 MG/DL — HIGH (ref 2.1–4.9)
PLATELET # BLD AUTO: 276 K/UL — SIGNIFICANT CHANGE UP (ref 130–400)
POTASSIUM SERPL-MCNC: 5.1 MMOL/L — HIGH (ref 3.5–5)
POTASSIUM SERPL-SCNC: 5.1 MMOL/L — HIGH (ref 3.5–5)
PROT SERPL-MCNC: 6.4 G/DL — SIGNIFICANT CHANGE UP (ref 6–8)
PROTHROM AB SERPL-ACNC: 17.9 SEC — HIGH (ref 9.95–12.87)
RBC # BLD: 3.27 M/UL — LOW (ref 4.2–5.4)
RBC # FLD: 12.3 % — SIGNIFICANT CHANGE UP (ref 11.5–14.5)
SODIUM SERPL-SCNC: 133 MMOL/L — LOW (ref 135–146)
TROPONIN T SERPL-MCNC: 0.01 NG/ML — SIGNIFICANT CHANGE UP
WBC # BLD: 11 K/UL — HIGH (ref 4.8–10.8)
WBC # FLD AUTO: 11 K/UL — HIGH (ref 4.8–10.8)

## 2020-02-14 PROCEDURE — 99222 1ST HOSP IP/OBS MODERATE 55: CPT

## 2020-02-14 PROCEDURE — 76770 US EXAM ABDO BACK WALL COMP: CPT | Mod: 26

## 2020-02-14 PROCEDURE — 93010 ELECTROCARDIOGRAM REPORT: CPT

## 2020-02-14 RX ORDER — METOPROLOL TARTRATE 50 MG
25 TABLET ORAL DAILY
Refills: 0 | Status: DISCONTINUED | OUTPATIENT
Start: 2020-02-14 | End: 2020-02-19

## 2020-02-14 RX ORDER — ATORVASTATIN CALCIUM 80 MG/1
80 TABLET, FILM COATED ORAL AT BEDTIME
Refills: 0 | Status: DISCONTINUED | OUTPATIENT
Start: 2020-02-14 | End: 2020-02-19

## 2020-02-14 RX ORDER — SERTRALINE 25 MG/1
50 TABLET, FILM COATED ORAL DAILY
Refills: 0 | Status: DISCONTINUED | OUTPATIENT
Start: 2020-02-14 | End: 2020-02-19

## 2020-02-14 RX ORDER — ASPIRIN/CALCIUM CARB/MAGNESIUM 324 MG
81 TABLET ORAL DAILY
Refills: 0 | Status: DISCONTINUED | OUTPATIENT
Start: 2020-02-14 | End: 2020-02-19

## 2020-02-14 RX ORDER — PANTOPRAZOLE SODIUM 20 MG/1
40 TABLET, DELAYED RELEASE ORAL
Refills: 0 | Status: DISCONTINUED | OUTPATIENT
Start: 2020-02-14 | End: 2020-02-19

## 2020-02-14 RX ORDER — OXYCODONE AND ACETAMINOPHEN 5; 325 MG/1; MG/1
1 TABLET ORAL EVERY 6 HOURS
Refills: 0 | Status: DISCONTINUED | OUTPATIENT
Start: 2020-02-14 | End: 2020-02-19

## 2020-02-14 RX ORDER — APIXABAN 2.5 MG/1
2.5 TABLET, FILM COATED ORAL EVERY 12 HOURS
Refills: 0 | Status: DISCONTINUED | OUTPATIENT
Start: 2020-02-14 | End: 2020-02-19

## 2020-02-14 RX ORDER — NITROGLYCERIN 6.5 MG
0.4 CAPSULE, EXTENDED RELEASE ORAL
Refills: 0 | Status: DISCONTINUED | OUTPATIENT
Start: 2020-02-14 | End: 2020-02-19

## 2020-02-14 RX ORDER — SODIUM BICARBONATE 1 MEQ/ML
650 SYRINGE (ML) INTRAVENOUS EVERY 8 HOURS
Refills: 0 | Status: DISCONTINUED | OUTPATIENT
Start: 2020-02-14 | End: 2020-02-18

## 2020-02-14 RX ADMIN — Medication 300 MILLIGRAM(S): at 16:51

## 2020-02-14 RX ADMIN — Medication 81 MILLIGRAM(S): at 11:54

## 2020-02-14 RX ADMIN — Medication 300 MILLIGRAM(S): at 23:53

## 2020-02-14 RX ADMIN — Medication 650 MILLIGRAM(S): at 16:52

## 2020-02-14 RX ADMIN — Medication 50 MILLIGRAM(S): at 05:59

## 2020-02-14 RX ADMIN — Medication 300 MILLIGRAM(S): at 18:48

## 2020-02-14 RX ADMIN — SERTRALINE 50 MILLIGRAM(S): 25 TABLET, FILM COATED ORAL at 11:52

## 2020-02-14 RX ADMIN — PANTOPRAZOLE SODIUM 40 MILLIGRAM(S): 20 TABLET, DELAYED RELEASE ORAL at 06:00

## 2020-02-14 RX ADMIN — ATORVASTATIN CALCIUM 80 MILLIGRAM(S): 80 TABLET, FILM COATED ORAL at 21:00

## 2020-02-14 RX ADMIN — Medication 650 MILLIGRAM(S): at 21:00

## 2020-02-14 RX ADMIN — APIXABAN 2.5 MILLIGRAM(S): 2.5 TABLET, FILM COATED ORAL at 05:59

## 2020-02-14 RX ADMIN — Medication 30 MILLILITER(S): at 20:57

## 2020-02-14 RX ADMIN — Medication 25 MILLIGRAM(S): at 05:59

## 2020-02-14 RX ADMIN — Medication 100 MILLIGRAM(S): at 05:59

## 2020-02-14 RX ADMIN — APIXABAN 2.5 MILLIGRAM(S): 2.5 TABLET, FILM COATED ORAL at 18:48

## 2020-02-14 NOTE — ED PROVIDER NOTE - CARE PLAN
Principal Discharge DX:	Chest pain  Secondary Diagnosis:	Wrist pain  Secondary Diagnosis:	Cellulitis of wrist  Secondary Diagnosis:	History of heart failure  Secondary Diagnosis:	Shortness of breath

## 2020-02-14 NOTE — H&P ADULT - NSHPREVIEWOFSYSTEMS_GEN_ALL_CORE
General : + Fever at home, + anorexia, no weight changes   Pulmonary : + Dyspnea, + cough productive of yellow sputum, no wheezing   Cardiovascular : + Chest pain, no palpitations, + orthopnea   GI : + Abdominal pain as described in HPI, + Nausea / vomiting, no diarrhea / constipation    : + Dysuria, no hematuria, normal urinary frequency

## 2020-02-14 NOTE — ED PROVIDER NOTE - OBJECTIVE STATEMENT
85yo F with PMH of CKD, diastolic HF, DVT/PE on Eliquis, pacemaker, diverticulitis, gout, RA, and HTN presenting to ED with CP/SOB x 1 hour PTA and 1 day of increased redness/swelling to R wrist (likely RA flare). Describes CP as crampy/sharp, mild, radiating into left neck and left arm, no exacerbating/relieving factors. SOB is mild, no increased work of breathing in ED, not on O2 and O2 sat in high 90's. Denies any f/c, cough, abdominal pain, n/v/d, extremity pain, calf pain/swelling, injuries/traumas/falls, or syncope.

## 2020-02-14 NOTE — CONSULT NOTE ADULT - ASSESSMENT
Pt with CKD stage 4/5, HTN, RA, CHF, admitted with CP.    CKD - creat is close to baseline  - pt states she does not want HD if needed in the future, follows with Dr Urrutia  no need for RRT now  - need 2 gr K diet, check phos, iPTH  -kidney  sono  - small kidneys no hydro  - agree to hold LAsix - no fluid overload  - strict Is and s please  MEt acidosis - start NA bicarb 650 mg po tid  Anemia - iron stores  Chest pain - being ruled out for MI  -2D Echo r/o peric effusion  f/u cardiology recs    [place]

## 2020-02-14 NOTE — H&P ADULT - HISTORY OF PRESENT ILLNESS
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, HFpEF, pacemaker placement, gout, rheumatoid arthritis and hypertension presenting for chest pain and right wrist pain   The patient states that her chest pain started this afternoon while she lying in bed, it is left sided and radiates to the left neck and left upper extremity, lasting a few minutes and then resolving on its own, recurrent every 5 minutes, worse when she takes deep breaths and improves with rest, also exacerbated by activity such as walking up the stairs. The pain is currently rated as 4/10 by the patient. She has been having recurrent chest pain in the past and underwent multiple cath that were normal and states that she had a stress test done last year that was normal. She states that her current chest pain is different from the previous ones. She also complaints of right wrist pain, edema and erythema as well as decreased range of motion since yesterday, accompanied by low grade fever of 100.   The patient is complaining of recurrent dyspnea as well as cough productive of yellow sputum and also reports orthopnea and abdominal pain that is exacerbating by feeling of voiding that quickly resolves after she urinates   In the ED : Troponin T 0.03, ECG showing atrial paced rhythm. Mild leucocytosis of 12.34. Chest and right wrist x rays pending. BNP 5170. patient was admitted for workup of her chest pain and possible cellulitis vs rheumatoid arthritis flare involving the right wrist

## 2020-02-14 NOTE — ED PROVIDER NOTE - PROGRESS NOTE DETAILS
Informed patient/family of lab/radiology results. Will admit for further monitoring. Given Bactrim and Prednisone for R wrist cellulitis vs. RA flare. No acute findings on xray. Hemodynamically stable.

## 2020-02-14 NOTE — H&P ADULT - NSHPOUTPATIENTPROVIDERS_GEN_ALL_CORE
----- Message from Jackelin Young sent at 1/2/2020  8:53 AM CST -----  Contact: Self  Pt calling in regards to an appt. Provider schedule is showing no slots available    Pt needs someone from office to contact him in regards to setting an appt     Please advise pt can be reached at [phone]    Primary care doctor : Dr. Celeste   Cardiologist : Dr. Mckinney   Nephrologist : Dr. Urrutia

## 2020-02-14 NOTE — H&P ADULT - NSICDXPASTMEDICALHX_GEN_ALL_CORE_FT
PAST MEDICAL HISTORY:  Chronic kidney disease     Diastolic heart failure     Diverticulitis sigmoid    DVT, lower extremity     Gout     Hypertension     Pacemaker     Rheumatoid arthritis

## 2020-02-14 NOTE — ED PROVIDER NOTE - PHYSICAL EXAMINATION
PHYSICAL EXAM: I have reviewed current vital signs.  GENERAL: NAD, elderly.  HEAD:  Normocephalic, atraumatic.  EYES: EOMI, PERRL, conjunctiva and sclera clear.  ENT: MMM, no erythema/exudates.  NECK: Supple, FROM.  CHEST/LUNG: CTAB, no rhonchi/rales/wheezing, no increased work of breathing, not on O2, O2 sat 99%.  HEART: Regular rate and rhythm, normal S1 and S2; no murmurs, rubs, or gallops.  ABDOMEN: Soft, nontender, nondistended.  EXTREMITIES:  2+ peripheral pulses; RUE- +Small erythema/swelling to R wrist, NV intact, normal ROM of digits, painful ROM of R wrist. No rashes, skin breakdown, or crepitus.  NEUROLOGY: A&O x 3. Motor 5/5. No focal neurological deficits.  SKIN: Warm and dry.

## 2020-02-14 NOTE — CONSULT NOTE ADULT - SUBJECTIVE AND OBJECTIVE BOX
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 The pain is currently rated as 4/10 by the patient. She has been having recurrent chest pain in the past and underwent cardiac cath few years ago which was normal and states that she had a stress test done couple of years ago that was normal. She also complaints of right wrist pain, edema and erythema as well as decreased range of motion since yesterday, accompanied by low grade fever of 100.   The patient is complaining of recurrent dyspnea as well as cough productive of yellow sputum and also reports orthopnea and abdominal pain that is exacerbating by feeling of voiding that quickly resolves after she urinates   In the ED : Troponin T 0.03, ECG showing atrial paced rhythm. Mild leucocytosis of 12.34. Chest and right wrist x rays pending. BNP 5170. Patient was admitted for workup of her chest pain and possible cellulitis vs rheumatoid arthritis flare involving the right wrist (14 Feb 2020 01:29)    Cardio consulted for chest pain and elevated troponins.       PAST MEDICAL & SURGICAL HISTORY  DVT, lower extremity  Rheumatoid arthritis  Diastolic heart failure  Diverticulitis: sigmoid  Gout  Hypertension  Pacemaker  Chronic kidney disease  History of hysterectomy  History of tonsillectomy  History of appendectomy  Artificial pacemaker      FAMILY HISTORY:  FAMILY HISTORY:  No pertinent family history in first degree relatives      SOCIAL HISTORY:  []smoker  []Alcohol  []Drug    ALLERGIES:  Keflex (Unknown)      MEDICATIONS:  MEDICATIONS  (STANDING):  apixaban 2.5 milliGRAM(s) Oral every 12 hours  aspirin  chewable 81 milliGRAM(s) Oral daily  atorvastatin 80 milliGRAM(s) Oral at bedtime  clindamycin IVPB 600 milliGRAM(s) IV Intermittent every 8 hours  metoprolol succinate ER 25 milliGRAM(s) Oral daily  pantoprazole    Tablet 40 milliGRAM(s) Oral before breakfast  predniSONE   Tablet 50 milliGRAM(s) Oral daily  sertraline 50 milliGRAM(s) Oral daily    MEDICATIONS  (PRN):  nitroglycerin     SubLingual 0.4 milliGRAM(s) SubLingual every 5 minutes PRN Chest Pain  oxycodone    5 mG/acetaminophen 325 mG 1 Tablet(s) Oral every 6 hours PRN Severe Pain (7 - 10)      HOME MEDICATIONS:  Home Medications:  apixaban 2.5 mg oral tablet: 1 tab(s) orally 2 times a day (14 Feb 2020 01:41)  atorvastatin 80 mg oral tablet: 1 tab(s) orally once a day (14 Feb 2020 01:41)  calcitriol 0.25 mcg oral capsule: 1 cap(s) orally once a day (14 Feb 2020 01:41)  furosemide 40 mg oral tablet: 1 tab(s) orally once a day (14 Feb 2020 01:41)  metoprolol succinate 25 mg oral tablet, extended release: 1 tab(s) orally once a day (14 Feb 2020 01:41)  sertraline 50 mg oral tablet: 1 tab(s) orally once a day (14 Feb 2020 01:41)      VITALS:   T(F): 97.3 (02-14 @ 08:04), Max: 99.1 (02-11 @ 06:12)  HR: 68 (02-14 @ 08:04) (58 - 81)  BP: 135/77 (02-14 @ 08:04) (123/67 - 152/87)  BP(mean): --  RR: 18 (02-14 @ 08:04) (18 - 24)  SpO2: 97% (02-14 @ 08:04) (94% - 99%)    I&O's Summary      REVIEW OF SYSTEMS:  CONSTITUTIONAL: see HPI  EYES: No visual changes  ENT: No vertigo or throat pain   NECK: see HPI  RESPIRATORY: see HPI  CARDIOVASCULAR: see HPI  GASTROINTESTINAL: No abdominal or epigastric pain. No nausea, vomiting, or hematemesis; No diarrhea or constipation. No melena or hematochezia.  GENITOURINARY: No dysuria, frequency or hematuria  NEUROLOGICAL: No numbness or weakness  SKIN: No itching, no rashes  MSK: no    PHYSICAL EXAM:  NEURO: patient is awake , alert and oriented X 3  GEN: Not in acute distress  NECK: no thyroid enlargement, JVD raised. Back of neck TTP.  LUNGS: left sided basal crackles.   CARDIOVASCULAR: S1/S2 present, RRR , no murmurs or rubs, no carotid bruits,  TTP left side of chest 3rd - 4th costal regions along anterior axillary line  ABD: Soft, non-tender, non-distended, +BS  EXT: No FABRICIO, Right wrist warm, erythematous, TTP with decreased ROM.  SKIN: Intact    LABS:                        9.5    11.00 )-----------( 276      ( 14 Feb 2020 07:16 )             29.9     02-14    133<L>  |  96<L>  |  64<HH>  ----------------------------<  154<H>  5.1<H>   |  18  |  2.8<H>    Ca    8.9      14 Feb 2020 07:16  Phos  5.1     02-14  Mg     2.0     02-14    TPro  6.4  /  Alb  3.2<L>  /  TBili  0.3  /  DBili  x   /  AST  41  /  ALT  22  /  AlkPhos  95  02-14    PT/INR - ( 14 Feb 2020 07:16 )   PT: 17.90 sec;   INR: 1.56 ratio         PTT - ( 14 Feb 2020 07:16 )  PTT:35.2 sec  Creatine Kinase, Serum: 101 U/L (02-14-20 @ 07:16)  Troponin T, Serum: 0.01 ng/mL (02-14-20 @ 07:16)  Sedimentation Rate, Erythrocyte: 126 mm/Hr <H> (02-14-20 @ 07:16)  Troponin T, Serum: 0.03 ng/mL <HH> (02-13-20 @ 20:49)    CARDIAC MARKERS ( 14 Feb 2020 07:16 )  x     / 0.01 ng/mL / 101 U/L / x     / 1.8 ng/mL  CARDIAC MARKERS ( 13 Feb 2020 20:49 )  x     / 0.03 ng/mL / x     / x     / x            Troponin trend:    Serum Pro-Brain Natriuretic Peptide: 5170 pg/mL (02-13-20 @ 20:49)          RADIOLOGY:  -CXR: < from: Xray Chest 1 View AP/PA (02.13.20 @ 21:32) >  Impression:      No consolidation, effusion or pneumothorax.    < end of copied text >    -TTE: < from: Transthoracic Echocardiogram (02.09.20 @ 15:28) >  Summary:   1. Left ventricular ejection fraction, by visual estimation, is 40 to 45%.   2. Mildly increased LV wall thickness.   3. Spectral Doppler shows impaired relaxation pattern of left ventricular myocardial filling (Grade I diastolic dysfunction).   4. Mild mitral valve regurgitation.    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 The pain is currently rated as 4/10 by the patient. She has been having recurrent chest pain in the past and underwent cardiac cath few years ago which was normal and states that she had a stress test done couple of years ago that was normal. She also complaints of right wrist pain, edema and erythema as well as decreased range of motion since yesterday, accompanied by low grade fever of 100.   The patient is complaining of recurrent dyspnea as well as cough productive of yellow sputum and also reports orthopnea and abdominal pain that is exacerbating by feeling of voiding that quickly resolves after she urinates   In the ED : Troponin T 0.03, ECG showing atrial paced rhythm. Mild leucocytosis of 12.34. Chest and right wrist x rays pending. BNP 5170. Patient was admitted for workup of her chest pain and possible cellulitis vs rheumatoid arthritis flare involving the right wrist (14 Feb 2020 01:29)    Cardio consulted for chest pain and elevated troponins.       PAST MEDICAL & SURGICAL HISTORY  DVT, lower extremity  Rheumatoid arthritis  Diastolic heart failure  Diverticulitis: sigmoid  Gout  Hypertension  Pacemaker  Chronic kidney disease  History of hysterectomy  History of tonsillectomy  History of appendectomy  Artificial pacemaker      FAMILY HISTORY:  FAMILY HISTORY:  No pertinent family history in first degree relatives      SOCIAL HISTORY:  []smoker  []Alcohol  []Drug    ALLERGIES:  Keflex (Unknown)      MEDICATIONS:  MEDICATIONS  (STANDING):  apixaban 2.5 milliGRAM(s) Oral every 12 hours  aspirin  chewable 81 milliGRAM(s) Oral daily  atorvastatin 80 milliGRAM(s) Oral at bedtime  clindamycin IVPB 600 milliGRAM(s) IV Intermittent every 8 hours  metoprolol succinate ER 25 milliGRAM(s) Oral daily  pantoprazole    Tablet 40 milliGRAM(s) Oral before breakfast  predniSONE   Tablet 50 milliGRAM(s) Oral daily  sertraline 50 milliGRAM(s) Oral daily    MEDICATIONS  (PRN):  nitroglycerin     SubLingual 0.4 milliGRAM(s) SubLingual every 5 minutes PRN Chest Pain  oxycodone    5 mG/acetaminophen 325 mG 1 Tablet(s) Oral every 6 hours PRN Severe Pain (7 - 10)      HOME MEDICATIONS:  Home Medications:  apixaban 2.5 mg oral tablet: 1 tab(s) orally 2 times a day (14 Feb 2020 01:41)  atorvastatin 80 mg oral tablet: 1 tab(s) orally once a day (14 Feb 2020 01:41)  calcitriol 0.25 mcg oral capsule: 1 cap(s) orally once a day (14 Feb 2020 01:41)  furosemide 40 mg oral tablet: 1 tab(s) orally once a day (14 Feb 2020 01:41)  metoprolol succinate 25 mg oral tablet, extended release: 1 tab(s) orally once a day (14 Feb 2020 01:41)  sertraline 50 mg oral tablet: 1 tab(s) orally once a day (14 Feb 2020 01:41)      VITALS:   T(F): 97.3 (02-14 @ 08:04), Max: 99.1 (02-11 @ 06:12)  HR: 68 (02-14 @ 08:04) (58 - 81)  BP: 135/77 (02-14 @ 08:04) (123/67 - 152/87)  BP(mean): --  RR: 18 (02-14 @ 08:04) (18 - 24)  SpO2: 97% (02-14 @ 08:04) (94% - 99%)    I&O's Summary      REVIEW OF SYSTEMS:  CONSTITUTIONAL: see HPI  EYES: No visual changes  ENT: No vertigo or throat pain   NECK: see HPI  RESPIRATORY: see HPI  CARDIOVASCULAR: see HPI  GASTROINTESTINAL: No abdominal or epigastric pain. No nausea, vomiting, or hematemesis; No diarrhea or constipation. No melena or hematochezia.  GENITOURINARY: No dysuria, frequency or hematuria  NEUROLOGICAL: No numbness or weakness  SKIN: No itching, no rashes  MSK: right wrist erythematous, warm and TTP. Chronic RA changes and joint deformities b/l hands.    PHYSICAL EXAM:  NEURO: patient is awake , alert and oriented X 3  GEN: Not in acute distress  NECK: no thyroid enlargement, JVD raised. Back of neck TTP.  LUNGS: left sided basal crackles.   CARDIOVASCULAR: S1/S2 present, RRR , no murmurs or rubs, no carotid bruits,  TTP left side of chest 3rd - 4th costal regions along anterior axillary line  ABD: Soft, non-tender, non-distended, +BS  EXT: No FABRICIO, Right wrist warm, erythematous, TTP with decreased ROM.  SKIN: Intact    LABS:                        9.5    11.00 )-----------( 276      ( 14 Feb 2020 07:16 )             29.9     02-14    133<L>  |  96<L>  |  64<HH>  ----------------------------<  154<H>  5.1<H>   |  18  |  2.8<H>    Ca    8.9      14 Feb 2020 07:16  Phos  5.1     02-14  Mg     2.0     02-14    TPro  6.4  /  Alb  3.2<L>  /  TBili  0.3  /  DBili  x   /  AST  41  /  ALT  22  /  AlkPhos  95  02-14    PT/INR - ( 14 Feb 2020 07:16 )   PT: 17.90 sec;   INR: 1.56 ratio         PTT - ( 14 Feb 2020 07:16 )  PTT:35.2 sec  Creatine Kinase, Serum: 101 U/L (02-14-20 @ 07:16)  Troponin T, Serum: 0.01 ng/mL (02-14-20 @ 07:16)  Sedimentation Rate, Erythrocyte: 126 mm/Hr <H> (02-14-20 @ 07:16)  Troponin T, Serum: 0.03 ng/mL <HH> (02-13-20 @ 20:49)    CARDIAC MARKERS ( 14 Feb 2020 07:16 )  x     / 0.01 ng/mL / 101 U/L / x     / 1.8 ng/mL  CARDIAC MARKERS ( 13 Feb 2020 20:49 )  x     / 0.03 ng/mL / x     / x     / x            Troponin trend:    Serum Pro-Brain Natriuretic Peptide: 5170 pg/mL (02-13-20 @ 20:49)          RADIOLOGY:  -CXR: < from: Xray Chest 1 View AP/PA (02.13.20 @ 21:32) >  Impression:      No consolidation, effusion or pneumothorax.    < end of copied text >    -TTE: < from: Transthoracic Echocardiogram (02.09.20 @ 15:28) >  Summary:   1. Left ventricular ejection fraction, by visual estimation, is 40 to 45%.   2. Mildly increased LV wall thickness.   3. Spectral Doppler shows impaired relaxation pattern of left ventricular myocardial filling (Grade I diastolic dysfunction).   4. Mild mitral valve regurgitation.    < end of copied text >    -CCTA:   -STRESS TEST:   -CATHETERIZATION:     ECG: < from: 12 Lead ECG (02.13.20 @ 20:28) >  Diagnosis Line Atrial-sensed ventricular-paced rhythm  Abnormal ECG    < end of copied text >      TELEMETRY EVENTS:

## 2020-02-14 NOTE — H&P ADULT - RS GEN PE MLT RESP DETAILS PC
normal/good air movement/respirations non-labored/breath sounds equal/clear to auscultation bilaterally/airway patent/no rales

## 2020-02-14 NOTE — H&P ADULT - NSICDXPASTSURGICALHX_GEN_ALL_CORE_FT
PAST SURGICAL HISTORY:  Artificial pacemaker     History of appendectomy     History of hysterectomy     History of tonsillectomy

## 2020-02-14 NOTE — CONSULT NOTE ADULT - SUBJECTIVE AND OBJECTIVE BOX
NEPHROLOGY CONSULTATION NOTE    Patient is a 84y Female whom presented to the hospital with chest pain. Seen for CKD.  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  The pain is  rated as 4/10 by the patient. She has been having recurrent chest pain in the past and underwent multiple cath that were normal and states that she had a stress test done last year that was normal.  She also complaints of right wrist pain, edema and erythema as well as decreased range of motion since yesterday, accompanied by low grade fever of 100.   The patient is complaining of recurrent dyspnea as well as cough productive of yellow sputum and also reports orthopnea and abdominal pain that is exacerbating by feeling of voiding that quickly resolves after she urinates   In the ED : Troponin T 0.03, ECG showing atrial paced rhythm. Mild leucocytosis of 12.34. Chest and right wrist x rays pending. BNP 5170. patient was admitted for workup of her chest pain and possible cellulitis vs rheumatoid arthritis flare involving the right wrist   Pt with known CKD 4, followed by Dr Urrutia, making urine, no hematuria, poor po intake, no vomiting.    PAST MEDICAL & SURGICAL HISTORY:  DVT, lower extremity  Rheumatoid arthritis  Diastolic heart failure  Diverticulitis: sigmoid  Gout  Hypertension  Pacemaker  Chronic kidney disease  History of hysterectomy  History of tonsillectomy  History of appendectomy  Artificial pacemaker    Allergies:  Keflex (Unknown)    Home Medications Reviewed  Hospital Medications:   MEDICATIONS  (STANDING):  apixaban 2.5 milliGRAM(s) Oral every 12 hours  aspirin  chewable 81 milliGRAM(s) Oral daily  atorvastatin 80 milliGRAM(s) Oral at bedtime  clindamycin   Capsule 600 milliGRAM(s) Oral every 8 hours  metoprolol succinate ER 25 milliGRAM(s) Oral daily  pantoprazole    Tablet 40 milliGRAM(s) Oral before breakfast  predniSONE   Tablet 50 milliGRAM(s) Oral daily  sertraline 50 milliGRAM(s) Oral daily      SOCIAL HISTORY:  Denies ETOH,Smoking,   FAMILY HISTORY:  No pertinent family history in first degree relatives        REVIEW OF SYSTEMS:  CONSTITUTIONAL: No weakness, fevers or chills  RESPIRATORY: HAs occ cough and shortness of breath  CARDIOVASCULAR: HAs chest pain no palpitations.  GASTROINTESTINAL: No abdominal or epigastric pain. No nausea, vomiting, or hematemesis; No diarrhea or constipation. No melena or hematochezia.  GENITOURINARY: No dysuria, frequency, NEUROLOGICAL: No numbness or weakness  SKIN: No itching, burning, rashes, or lesions   VASCULAR: No bilateral lower extremity edema.   All other review of systems is negative unless indicated above.    VITALS:  T(F): 97.3 (20 @ 08:04), Max: 98.8 (20 @ 20:25)  HR: 68 (20 @ 08:04)  BP: 135/77 (20 @ 08:04)  RR: 18 (20 @ 08:04)  SpO2: 97% (20 @ 08:04)      Weight (kg): 58.967 ( @ 20:48)    I&O's Detail    Creatine Kinase, Serum: 101 U/L (20 @ 07:16)      PHYSICAL EXAM:  Constitutional: NAD  HEENT: anicteric sclera, oropharynx clear, MMM  Neck: No JVD  Respiratory: CTAB, no wheezes, rales or rhonchi  Cardiovascular: S1, S2, RRR  Gastrointestinal: BS+, soft, NT/ND  Extremities: No cyanosis or clubbing. No peripheral edema  Neurological: A/O x 3, no focal deficits  Psychiatric: Normal mood, normal affect  : No CVA tenderness. No carlos.   Skin: No rashes  Vascular Access:    LABS:      133<L>  |  96<L>  |  64<HH>  ----------------------------<  154<H>  5.1<H>   |  18  |  2.8<H>    Ca    8.9      2020 07:16  Phos  5.1       Mg     2.0         TPro  6.4  /  Alb  3.2<L>  /  TBili  0.3  /  DBili      /  AST  41  /  ALT  22  /  AlkPhos  95      Creatinine Trend: 2.8 <--, 3.2 <--, 2.3 <--, 2.3 <--, 2.4 <--, 2.3 <--, 2.4 <--                        9.5    11.00 )-----------( 276      ( 2020 07:16 )             29.9     Urine Studies:  Urinalysis Basic - ( 10 Feb 2020 16:10 )    Color: Light Yellow / Appearance: Clear / S.011 / pH:   Gluc:  / Ketone: Negative  / Bili: Negative / Urobili: <2 mg/dL   Blood:  / Protein: 30 mg/dL / Nitrite: Negative   Leuk Esterase: Negative / RBC: 2 /HPF / WBC 1 /HPF   Sq Epi:  / Non Sq Epi: 2 /HPF / Bacteria: Negative                RADIOLOGY & ADDITIONAL STUDIES:    < from: US Kidney and Bladder (20 @ 09:32) >    RIGHT KIDNEY: Normal in echogenicity, size measuring 8.7 cm in length. No evidence of hydronephrosis, calculus. There is a lower pole cyst measuring 1.4 cm. Vascular flow is demonstrated at the hilum.    LEFT KIDNEY: Limited in evaluation due to bowel gas. Normal in echogenicity, size measuring 8.1 cm in length. No evidence of hydronephrosis, calculus. Vascular flow is demonstrated at the hilum.     URINARY BLADDER: Prevoid volume of approximately 142 cc.  No wall thickening, debris or calculus is seen. Bilateral ureteral jets are visualized. Postvoid volume is approximately 0 cc.    IMPRESSION:    No hydronephrosis.    Post void residual of 0 cc.    < end of copied text >  < from: Xray Chest 1 View AP/PA (20 @ 21:32) >    Impression:      No consolidation, effusion or pneumothorax.    < end of copied text > Normal rate, regular rhythm.  Heart sounds S1, S2.  No murmurs, rubs or gallops.

## 2020-02-14 NOTE — H&P ADULT - ATTENDING COMMENTS
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

## 2020-02-14 NOTE — ED PROVIDER NOTE - ATTENDING CONTRIBUTION TO CARE
84 F to ED with CP  Substernal radiating to neck and arm, no alleviat factors, assoc with SOB  no fevers or sick contacts, no travels, no trauma.   h/o CKD, DVT on eliquis, pacer gout, diverticulitis and redness to R wrist,   AVSS, exam as noted, CTAB, RRR, abdomen soft NTND, (+) bowel sounds, neuro nonfoc

## 2020-02-14 NOTE — PHARMACOTHERAPY INTERVENTION NOTE - COMMENTS
I spoke with Dr Woodruff and recommended to adjust dose from 600 mg po q6h to 300-450 mg q6h (cellulitis) will consider

## 2020-02-14 NOTE — ED PROVIDER NOTE - NS ED ROS FT
Constitutional:  No fevers or chills.  Eyes:  No visual changes, eye pain, or discharge.  ENT:  No hearing changes. No sore throat.  Neck:  No neck pain or stiffness.  Cardiac:  +CP.  Resp:  No cough/hemoptysis, +SOB.  GI:  No nausea, vomiting, diarrhea, or abdominal pain.  :  No dysuria, frequency, or hematuria.  MSK:  +R wrist pain/redness.  Neuro:  No headache, dizziness, or weakness.  Skin:  No skin rash.

## 2020-02-14 NOTE — PATIENT PROFILE ADULT - INFLUENZA IMMUNIZATION DATE (APPROXIMATE)
LAST OV 3/9/18  NEXT OV 5/14/18  LAST REFILL 4/17/18  PUNEET 3/19/18  
Prescription called in  
14-Oct-2019

## 2020-02-14 NOTE — H&P ADULT - ASSESSMENT
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 Mckinney   - Will trend troponin and ECG   - Nitroglycerin q5 minutes and will re-assess   - Will start Aspirin 81 mg daily, continue Lipitor and beta blockers   - last echocardiogram was done 02/09/2020 showed EF 40 - 45 % and Grade I diastolic dysfunction. Echo from November 2019 showing EF 55 to 60 %. Will await cardiology recommendations regarding ischemic workup     # Right wrist pain with inflammatory findings, cellulitis vs rheumatoid arthritis flare   - Leucocytosis noted on admission.   - Will start Clindamycin for possible cellulitis and continue Prednisone 50 mg daily for suspected rheumatoid arthritis flare   - F/U wrist x ray   - F/U ESR, CRP   - Pain control with Percocet. Avoid NSAIDs given age, OMERO and cardiac history    # OMERO on CKD   - Creatinine 3.2 on admission vs baseline of 2.3   - Will hold Lasix for now as clinically not in volume overload   - F/U US of the kidneys and bladder to rule out retention   - Urine lytes   - Monitor urine output   - Nephrology consult   - Calcitriol on hold given normal calcium level. F/U with nephrology if should be restarted     # Systolic and diastolic CHF   - Lasix on hold given OMERO and no volume overload   - Monitor Is and Os and daily weights   - Continue Toprol. Will hold off on starting ACEi given OMERO, previous cardio notes also mentioning that patient is very sensitive to ACEi     # History of provoked DVT and PE   - Continue Eliquis 2.5 mg BID   - recent venous doppler of the lower extremities negative for DVT (02/07/2020)     # HTN : Continue Toprol 25 mg daily     # LUTS with leucocytosis : Possible UTI   - F/U UA and urine cultures   - Rocephin 1g IV daily pending culture results     # Miscellaneous   ·	DVT prophylaxis : Patient on Eliquis   ·	GI prophylaxis : Protonix   ·	Activity : Ambulate with assistance   ·	Diet : DASH TLC renal diet   ·	Full code 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 Mckinney   - Will trend troponin and ECG   - Nitroglycerin q5 minutes and will re-assess   - Will start Aspirin 81 mg daily, continue Lipitor and beta blockers   - last echocardiogram was done 02/09/2020 showed EF 40 - 45 % and Grade I diastolic dysfunction. Echo from November 2019 showing EF 55 to 60 %. Will await cardiology recommendations regarding ischemic workup     # Right wrist pain with inflammatory findings, cellulitis vs rheumatoid arthritis flare   - Leucocytosis noted on admission.   - Will start Clindamycin for possible cellulitis and continue Prednisone 50 mg daily for suspected rheumatoid arthritis flare   - F/U wrist x ray   - F/U ESR, CRP   - Pain control with Percocet. Avoid NSAIDs given age, MOERO and cardiac history    # OMERO on CKD   - Creatinine 3.2 on admission vs baseline of 2.3   - Will hold Lasix for now as clinically not in volume overload   - F/U US of the kidneys and bladder to rule out retention   - Urine lytes   - Monitor urine output   - Nephrology consult   - Calcitriol on hold given normal calcium level. F/U with nephrology if should be restarted     # Systolic and diastolic CHF   - Lasix on hold given OMERO and no volume overload   - Monitor Is and Os and daily weights   - Continue Toprol. Will hold off on starting ACEi given OMERO, previous cardio notes also mentioning that patient is very sensitive to ACEi     # History of provoked DVT and PE   - Continue Eliquis 2.5 mg BID   - recent venous doppler of the lower extremities negative for DVT (02/07/2020)     # HTN : Continue Toprol 25 mg daily     # LUTS with leucocytosis : Possible UTI   - F/U UA and urine cultures   - Antibiotics if UA positive     # Miscellaneous   ·	DVT prophylaxis : Patient on Eliquis   ·	GI prophylaxis : Protonix   ·	Activity : Ambulate with assistance   ·	Diet : DASH TLC renal diet   ·	Full code

## 2020-02-14 NOTE — CONSULT NOTE ADULT - ASSESSMENT
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     # IMPRESSION   HFpEF s/p CRT  s/p PPM  HTN  Hx of DVT/PE on eliquis  OMERO on CKD IV, Hyperphosphatemia  RA flare   EKG showing atrial paced rhythm  troponins 0.03 -->0.01    # RECOMMENDATIONS  Chest pain is non cardiac most likely, reproducible on palpation. Elevated troponins likely from acute inflammation, OMERO on CKD and CHF, now improved.   Neck pain with radiation to chest and left arm likely from flare of RA, consider rheum eval pt is not on DMARDs  c/w lasix, monitor fluid status and renal function, consider nephro eval  out pt f/u in office,    Will discuss with attending. 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     # IMPRESSION   HFpEF s/p CRT  s/p PPM  HTN  Hx of DVT/PE on eliquis  OMERO on CKD IV, Hyperphosphatemia  RA flare   EKG showing atrial paced rhythm  troponins 0.03 -->0.01    # RECOMMENDATIONS  Chest pain is non cardiac most likely, reproducible on palpation. Elevated troponins likely from acute inflammation, OMERO on CKD and CHF, now improved.   Neck pain with radiation to chest and left arm likely from flare of RA, consider rheum eval pt is not on DMARDs  c/w lasix, monitor fluid status and renal function, consider nephro eval  out pt f/u with Dr Mckinney,    Will discuss with attending. 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     # IMPRESSION   HFpEF s/p CRT  s/p PPM  HTN  Hx of DVT/PE on eliquis  OMERO on CKD IV, Hyperphosphatemia  RA flare   EKG showing atrial paced rhythm  troponins 0.03 -->0.01    # RECOMMENDATIONS  Chest pain is non cardiac most likely, reproducible on palpation. Elevated troponins likely from acute inflammation, OMERO on CKD and CHF, now improved.   Neck pain with radiation to chest and left arm likely from flare of RA, consider rheum eval pt is not on DMARDs  can hold lasix, monitor fluid status and renal function, consider nephro eval  out pt f/u with Dr Mckinney,    Will discuss with attending. 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     # IMPRESSION   HFpEF s/p CRT  s/p PPM  HTN  Hx of DVT/PE on eliquis  OMERO on CKD IV, Hyperphosphatemia  RA flare   EKG showing atrial paced rhythm  troponins 0.03 -->0.01    # RECOMMENDATIONS  Chest pain is non cardiac most likely, reproducible on palpation. Elevated troponins likely from acute inflammation, OMERO on CKD and CHF, now improved.   Neck pain with radiation to chest and left arm likely from flare of RA, consider rheum eval pt is not on DMARDs  can hold lasix, monitor fluid status and renal function, consider nephro eval  can get 2D echo to r/o pericarditis.  out pt f/u with Dr Mckinney,    Will discuss with attending.

## 2020-02-15 DIAGNOSIS — I50.32 CHRONIC DIASTOLIC (CONGESTIVE) HEART FAILURE: ICD-10-CM

## 2020-02-15 DIAGNOSIS — Z95.0 PRESENCE OF CARDIAC PACEMAKER: ICD-10-CM

## 2020-02-15 DIAGNOSIS — N18.4 CHRONIC KIDNEY DISEASE, STAGE 4 (SEVERE): ICD-10-CM

## 2020-02-15 DIAGNOSIS — H60.92 UNSPECIFIED OTITIS EXTERNA, LEFT EAR: ICD-10-CM

## 2020-02-15 DIAGNOSIS — J06.9 ACUTE UPPER RESPIRATORY INFECTION, UNSPECIFIED: ICD-10-CM

## 2020-02-15 DIAGNOSIS — M06.9 RHEUMATOID ARTHRITIS, UNSPECIFIED: ICD-10-CM

## 2020-02-15 DIAGNOSIS — R91.1 SOLITARY PULMONARY NODULE: ICD-10-CM

## 2020-02-15 DIAGNOSIS — N28.89 OTHER SPECIFIED DISORDERS OF KIDNEY AND URETER: ICD-10-CM

## 2020-02-15 DIAGNOSIS — I13.0 HYPERTENSIVE HEART AND CHRONIC KIDNEY DISEASE WITH HEART FAILURE AND STAGE 1 THROUGH STAGE 4 CHRONIC KIDNEY DISEASE, OR UNSPECIFIED CHRONIC KIDNEY DISEASE: ICD-10-CM

## 2020-02-15 DIAGNOSIS — Z90.710 ACQUIRED ABSENCE OF BOTH CERVIX AND UTERUS: ICD-10-CM

## 2020-02-15 DIAGNOSIS — M10.9 GOUT, UNSPECIFIED: ICD-10-CM

## 2020-02-15 DIAGNOSIS — R07.9 CHEST PAIN, UNSPECIFIED: ICD-10-CM

## 2020-02-15 LAB
ANION GAP SERPL CALC-SCNC: 16 MMOL/L — HIGH (ref 7–14)
APPEARANCE UR: CLEAR — SIGNIFICANT CHANGE UP
BACTERIA # UR AUTO: NEGATIVE — SIGNIFICANT CHANGE UP
BILIRUB UR-MCNC: NEGATIVE — SIGNIFICANT CHANGE UP
BUN SERPL-MCNC: 73 MG/DL — CRITICAL HIGH (ref 10–20)
CALCIUM SERPL-MCNC: 8.5 MG/DL — SIGNIFICANT CHANGE UP (ref 8.5–10.1)
CHLORIDE SERPL-SCNC: 95 MMOL/L — LOW (ref 98–110)
CO2 SERPL-SCNC: 21 MMOL/L — SIGNIFICANT CHANGE UP (ref 17–32)
COLOR SPEC: SIGNIFICANT CHANGE UP
CREAT ?TM UR-MCNC: 76 MG/DL — SIGNIFICANT CHANGE UP
CREAT SERPL-MCNC: 2.8 MG/DL — HIGH (ref 0.7–1.5)
DIFF PNL FLD: NEGATIVE — SIGNIFICANT CHANGE UP
EPI CELLS # UR: 1 /HPF — SIGNIFICANT CHANGE UP (ref 0–5)
GLUCOSE SERPL-MCNC: 150 MG/DL — HIGH (ref 70–99)
GLUCOSE UR QL: NEGATIVE — SIGNIFICANT CHANGE UP
HCT VFR BLD CALC: 29.3 % — LOW (ref 37–47)
HGB BLD-MCNC: 9.4 G/DL — LOW (ref 12–16)
HYALINE CASTS # UR AUTO: 1 /LPF — SIGNIFICANT CHANGE UP (ref 0–7)
KETONES UR-MCNC: NEGATIVE — SIGNIFICANT CHANGE UP
LEUKOCYTE ESTERASE UR-ACNC: ABNORMAL
MAGNESIUM SERPL-MCNC: 2.1 MG/DL — SIGNIFICANT CHANGE UP (ref 1.8–2.4)
MCHC RBC-ENTMCNC: 29.1 PG — SIGNIFICANT CHANGE UP (ref 27–31)
MCHC RBC-ENTMCNC: 32.1 G/DL — SIGNIFICANT CHANGE UP (ref 32–37)
MCV RBC AUTO: 90.7 FL — SIGNIFICANT CHANGE UP (ref 81–99)
NITRITE UR-MCNC: NEGATIVE — SIGNIFICANT CHANGE UP
NRBC # BLD: 0 /100 WBCS — SIGNIFICANT CHANGE UP (ref 0–0)
OSMOLALITY UR: 301 MOS/KG — SIGNIFICANT CHANGE UP (ref 50–1400)
PH UR: 5.5 — SIGNIFICANT CHANGE UP (ref 5–8)
PLATELET # BLD AUTO: 326 K/UL — SIGNIFICANT CHANGE UP (ref 130–400)
POTASSIUM SERPL-MCNC: 5.3 MMOL/L — HIGH (ref 3.5–5)
POTASSIUM SERPL-SCNC: 5.3 MMOL/L — HIGH (ref 3.5–5)
PROT ?TM UR-MCNC: 32 MG/DLG/24H — SIGNIFICANT CHANGE UP
PROT ?TM UR-MCNC: 32 MG/DLG/24H — SIGNIFICANT CHANGE UP
PROT UR-MCNC: ABNORMAL
PROT/CREAT UR-RTO: 0.4 RATIO — HIGH (ref 0–0.2)
RBC # BLD: 3.23 M/UL — LOW (ref 4.2–5.4)
RBC # FLD: 12.2 % — SIGNIFICANT CHANGE UP (ref 11.5–14.5)
RBC CASTS # UR COMP ASSIST: 6 /HPF — HIGH (ref 0–4)
RHEUMATOID FACT SERPL-ACNC: 19 IU/ML — HIGH (ref 0–13)
SODIUM SERPL-SCNC: 132 MMOL/L — LOW (ref 135–146)
SODIUM UR-SCNC: <20 MMOL/L — SIGNIFICANT CHANGE UP
SP GR SPEC: 1.01 — SIGNIFICANT CHANGE UP (ref 1.01–1.02)
URATE SERPL-MCNC: 11 MG/DL — HIGH (ref 2.5–7)
UROBILINOGEN FLD QL: SIGNIFICANT CHANGE UP
UUN UR-MCNC: 507 MG/DL — SIGNIFICANT CHANGE UP
WBC # BLD: 19.03 K/UL — HIGH (ref 4.8–10.8)
WBC # FLD AUTO: 19.03 K/UL — HIGH (ref 4.8–10.8)
WBC UR QL: 26 /HPF — HIGH (ref 0–5)

## 2020-02-15 PROCEDURE — 73562 X-RAY EXAM OF KNEE 3: CPT | Mod: 26,50

## 2020-02-15 RX ORDER — CALCIUM ACETATE 667 MG
1334 TABLET ORAL
Refills: 0 | Status: DISCONTINUED | OUTPATIENT
Start: 2020-02-15 | End: 2020-02-19

## 2020-02-15 RX ADMIN — Medication 650 MILLIGRAM(S): at 05:46

## 2020-02-15 RX ADMIN — Medication 650 MILLIGRAM(S): at 22:26

## 2020-02-15 RX ADMIN — APIXABAN 2.5 MILLIGRAM(S): 2.5 TABLET, FILM COATED ORAL at 17:13

## 2020-02-15 RX ADMIN — Medication 30 MILLILITER(S): at 18:38

## 2020-02-15 RX ADMIN — APIXABAN 2.5 MILLIGRAM(S): 2.5 TABLET, FILM COATED ORAL at 05:46

## 2020-02-15 RX ADMIN — Medication 50 MILLIGRAM(S): at 05:46

## 2020-02-15 RX ADMIN — SERTRALINE 50 MILLIGRAM(S): 25 TABLET, FILM COATED ORAL at 11:22

## 2020-02-15 RX ADMIN — Medication 1334 MILLIGRAM(S): at 11:22

## 2020-02-15 RX ADMIN — ATORVASTATIN CALCIUM 80 MILLIGRAM(S): 80 TABLET, FILM COATED ORAL at 22:26

## 2020-02-15 RX ADMIN — PANTOPRAZOLE SODIUM 40 MILLIGRAM(S): 20 TABLET, DELAYED RELEASE ORAL at 05:46

## 2020-02-15 RX ADMIN — Medication 25 MILLIGRAM(S): at 05:46

## 2020-02-15 RX ADMIN — Medication 300 MILLIGRAM(S): at 05:45

## 2020-02-15 RX ADMIN — Medication 81 MILLIGRAM(S): at 11:22

## 2020-02-15 NOTE — PROGRESS NOTE ADULT - ASSESSMENT
Pt with CKD stage 4/5, HTN, RA, CHF, admitted with CP.    CKD - creat is close to baseline  - pt states she does not want HD if needed in the future, follows with Dr Urrutia  - ph noted, start phoslo 1 q 8   -kidney  sono  - small kidneys no hydro  - keep holding lasix   -please document accurate UO   MEt acidosis - continue NA bicarb 650 mg po  Anemia - iron stores, no need for ELIJAH   cardiology notes appreciated   -2D Echo no pericardial effusion / diastolic dysfunction   Will bronsonlow

## 2020-02-15 NOTE — PROGRESS NOTE ADULT - SUBJECTIVE AND OBJECTIVE BOX
SUBJECTIVE:    Patient is a 84y old Female who presents with a chief complaint of Chest pain, right wrist pain (15 Feb 2020 09:58)    Currently admitted to medicine with the primary diagnosis of Chest pain     Today is hospital day 1d.   feeling well     PAST MEDICAL & SURGICAL HISTORY  DVT, lower extremity  Rheumatoid arthritis  Diastolic heart failure  Diverticulitis: sigmoid  Gout  Hypertension  Pacemaker  Chronic kidney disease  History of hysterectomy  History of tonsillectomy  History of appendectomy  Artificial pacemaker    SOCIAL HISTORY:  Negative for smoking/alcohol/drug use.     ALLERGIES:  Keflex (Unknown)    MEDICATIONS:  STANDING MEDICATIONS  apixaban 2.5 milliGRAM(s) Oral every 12 hours  aspirin  chewable 81 milliGRAM(s) Oral daily  atorvastatin 80 milliGRAM(s) Oral at bedtime  calcium acetate 1334 milliGRAM(s) Oral three times a day with meals  metoprolol succinate ER 25 milliGRAM(s) Oral daily  pantoprazole    Tablet 40 milliGRAM(s) Oral before breakfast  predniSONE   Tablet 50 milliGRAM(s) Oral daily  sertraline 50 milliGRAM(s) Oral daily  sodium bicarbonate 650 milliGRAM(s) Oral every 8 hours    PRN MEDICATIONS  aluminum hydroxide/magnesium hydroxide/simethicone Suspension 30 milliLiter(s) Oral every 6 hours PRN  nitroglycerin     SubLingual 0.4 milliGRAM(s) SubLingual every 5 minutes PRN  oxycodone    5 mG/acetaminophen 325 mG 1 Tablet(s) Oral every 6 hours PRN    VITALS:   T(F): 96.3  HR: 70  BP: 118/64  RR: 18  SpO2: 97%    LABS:                        9.4    19.03 )-----------( 326      ( 15 Feb 2020 08:19 )             29.3     02-15    132<L>  |  95<L>  |  73<HH>  ----------------------------<  150<H>  5.3<H>   |  21  |  2.8<H>    Ca    8.5      15 Feb 2020 08:19  Phos  5.1     02-14  Mg     2.1     02-15    TPro  6.4  /  Alb  3.2<L>  /  TBili  0.3  /  DBili  x   /  AST  41  /  ALT  22  /  AlkPhos  95  02-14    PT/INR - ( 14 Feb 2020 07:16 )   PT: 17.90 sec;   INR: 1.56 ratio         PTT - ( 14 Feb 2020 07:16 )  PTT:35.2 sec          CARDIAC MARKERS ( 14 Feb 2020 07:16 )  x     / 0.01 ng/mL / 101 U/L / x     / 1.8 ng/mL  CARDIAC MARKERS ( 13 Feb 2020 20:49 )  x     / 0.03 ng/mL / x     / x     / x          RADIOLOGY:    PHYSICAL EXAM:  GEN: No acute distress  LUNGS: Clear to auscultation bilaterally   HEART: Regular  ABD: Soft, non-tender, non-distended.  EXT: NC/NC/NE/2+PP/JUÁREZ/Skin Intact.   NEURO: AAOX3    Intravenous access:   NG tube:   Morgan Catheter: SUBJECTIVE:    Patient is a 84y old Female who presents with a chief complaint of Chest pain, right wrist pain (15 Feb 2020 09:58)    Currently admitted to medicine with the primary diagnosis of Chest pain     Today is hospital day 1d.   feeling well     PAST MEDICAL & SURGICAL HISTORY  DVT, lower extremity  Rheumatoid arthritis  Diastolic heart failure  Diverticulitis: sigmoid  Gout  Hypertension  Pacemaker  Chronic kidney disease  History of hysterectomy  History of tonsillectomy  History of appendectomy  Artificial pacemaker    SOCIAL HISTORY:  Negative for smoking/alcohol/drug use.     ALLERGIES:  Keflex (Unknown)    MEDICATIONS:  STANDING MEDICATIONS  apixaban 2.5 milliGRAM(s) Oral every 12 hours  aspirin  chewable 81 milliGRAM(s) Oral daily  atorvastatin 80 milliGRAM(s) Oral at bedtime  calcium acetate 1334 milliGRAM(s) Oral three times a day with meals  metoprolol succinate ER 25 milliGRAM(s) Oral daily  pantoprazole    Tablet 40 milliGRAM(s) Oral before breakfast  predniSONE   Tablet 50 milliGRAM(s) Oral daily  sertraline 50 milliGRAM(s) Oral daily  sodium bicarbonate 650 milliGRAM(s) Oral every 8 hours    PRN MEDICATIONS  aluminum hydroxide/magnesium hydroxide/simethicone Suspension 30 milliLiter(s) Oral every 6 hours PRN  nitroglycerin     SubLingual 0.4 milliGRAM(s) SubLingual every 5 minutes PRN  oxycodone    5 mG/acetaminophen 325 mG 1 Tablet(s) Oral every 6 hours PRN    VITALS:   T(F): 96.3  HR: 70  BP: 118/64  RR: 18  SpO2: 97%    LABS:                        9.4    19.03 )-----------( 326      ( 15 Feb 2020 08:19 )             29.3     02-15    132<L>  |  95<L>  |  73<HH>  ----------------------------<  150<H>  5.3<H>   |  21  |  2.8<H>    Ca    8.5      15 Feb 2020 08:19  Phos  5.1     02-14  Mg     2.1     02-15    TPro  6.4  /  Alb  3.2<L>  /  TBili  0.3  /  DBili  x   /  AST  41  /  ALT  22  /  AlkPhos  95  02-14    PT/INR - ( 14 Feb 2020 07:16 )   PT: 17.90 sec;   INR: 1.56 ratio         PTT - ( 14 Feb 2020 07:16 )  PTT:35.2 sec          CARDIAC MARKERS ( 14 Feb 2020 07:16 )  x     / 0.01 ng/mL / 101 U/L / x     / 1.8 ng/mL  CARDIAC MARKERS ( 13 Feb 2020 20:49 )  x     / 0.03 ng/mL / x     / x     / x      Creatinine:   2.8 (02-15 @ 08:19)  GFR (AA):     17  GFR (non AA): 15    Creatinine:   2.8 (02-14 @ 07:16)  GFR (AA):     17  GFR (non AA): 15    Creatinine:   3.2 (02-13 @ 20:49)  GFR (AA):     15  GFR (non AA): 13    Creatinine:   2.3 (02-11 @ 06:27)  GFR (AA):     22  GFR (non AA): 19            RADIOLOGY:    PHYSICAL EXAM:  GEN: No acute distress  LUNGS: Clear to auscultation bilaterally   HEART: Regular  ABD: Soft, non-tender, non-distended.  EXT: NC/NC/NE/2+PP/JUÁREZ/Skin Intact.   NEURO: AAOX3    Intravenous access:   NG tube:   Morgan Catheter:

## 2020-02-15 NOTE — PROGRESS NOTE ADULT - SUBJECTIVE AND OBJECTIVE BOX
seen and examined  no distress  lying comfortable         PAST HISTORY  --------------------------------------------------------------------------------  No significant changes to PMH, PSH, FHx, SHx, unless otherwise noted    ALLERGIES & MEDICATIONS  --------------------------------------------------------------------------------  Allergies    Keflex (Unknown)    Intolerances      Standing Inpatient Medications  apixaban 2.5 milliGRAM(s) Oral every 12 hours  aspirin  chewable 81 milliGRAM(s) Oral daily  atorvastatin 80 milliGRAM(s) Oral at bedtime  metoprolol succinate ER 25 milliGRAM(s) Oral daily  pantoprazole    Tablet 40 milliGRAM(s) Oral before breakfast  predniSONE   Tablet 50 milliGRAM(s) Oral daily  sertraline 50 milliGRAM(s) Oral daily  sodium bicarbonate 650 milliGRAM(s) Oral every 8 hours    PRN Inpatient Medications  aluminum hydroxide/magnesium hydroxide/simethicone Suspension 30 milliLiter(s) Oral every 6 hours PRN  nitroglycerin     SubLingual 0.4 milliGRAM(s) SubLingual every 5 minutes PRN  oxycodone    5 mG/acetaminophen 325 mG 1 Tablet(s) Oral every 6 hours PRN          VITALS/PHYSICAL EXAM  --------------------------------------------------------------------------------  T(C): 35.7 (02-15-20 @ 05:09), Max: 36.6 (02-14-20 @ 16:05)  HR: 61 (02-15-20 @ 05:09) (61 - 95)  BP: 119/60 (02-15-20 @ 05:09) (119/60 - 146/73)  RR: 18 (02-15-20 @ 05:09) (18 - 18)  SpO2: 99% (02-14-20 @ 19:44) (97% - 99%)  Wt(kg): --    Weight (kg): 58.967 (02-13-20 @ 20:48)      02-14-20 @ 07:01  -  02-15-20 @ 07:00  --------------------------------------------------------  IN: 336 mL / OUT: 200 mL / NET: 136 mL      Physical Exam:  	Gen: NAD  	Pulm: decrease BS  B/L  	CV: S1S2; no rub  	Abd: +distended  	LE: no edema    LABS/STUDIES  --------------------------------------------------------------------------------              9.5    11.00 >-----------<  276      [02-14-20 @ 07:16]              29.9     133  |  96  |  64  ----------------------------<  154      [02-14-20 @ 07:16]  5.1   |  18  |  2.8        Ca     8.9     [02-14-20 @ 07:16]      Mg     2.0     [02-14-20 @ 07:16]      Phos  5.1     [02-14-20 @ 07:16]    TPro  6.4  /  Alb  3.2  /  TBili  0.3  /  DBili  x   /  AST  41  /  ALT  22  /  AlkPhos  95  [02-14-20 @ 07:16]    PT/INR: PT 17.90, INR 1.56       [02-14-20 @ 07:16]  PTT: 35.2       [02-14-20 @ 07:16]    Troponin 0.01      [02-14-20 @ 07:16]        [02-14-20 @ 07:16]    Creatinine Trend:  SCr 2.8 [02-14 @ 07:16]  SCr 3.2 [02-13 @ 20:49]  SCr 2.3 [02-11 @ 06:27]  SCr 2.3 [02-10 @ 05:33]  SCr 2.4 [02-09 @ 06:36]    Urinalysis - [02-10-20 @ 16:10]      Color Light Yellow / Appearance Clear / SG 1.011 / pH 6.0      Gluc Negative / Ketone Negative  / Bili Negative / Urobili <2 mg/dL       Blood Negative / Protein 30 mg/dL / Leuk Est Negative / Nitrite Negative      RBC 2 / WBC 1 / Hyaline 2 / Gran  / Sq Epi  / Non Sq Epi 2 / Bacteria Negative

## 2020-02-15 NOTE — PROGRESS NOTE ADULT - ASSESSMENT
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 -- r/o pericardial effusion cardiology advised non cardiac d/w resident order echo  right wrist pain RA flares up   x ray result noted increase ESR and CRP pain control w percocet rheumatology consult  OMERO on CKD  creatinine 2.8 hold Lasix     d/w Resident and RN

## 2020-02-15 NOTE — PROGRESS NOTE ADULT - SUBJECTIVE AND OBJECTIVE BOX
RACHEL SUMMERS 84y Female  MRN#: [id number]   CODE STATUS: FULL      SUBJECTIVE  Patient is a 84y old Female who presents with a chief complaint of Chest pain, right wrist pain (15 Feb 2020 07:42)    Currently admitted to medicine with the primary diagnosis of RA flare    Today is hospital day 1d,   OVERNIGHT EVENTS: none. Patient still has right wrist pain, burning epigastric pain with tenderness. She was complaining B/L knee pain L>R) with right knee swelling as well.     Urinating and stooling appropriately.    Present Today:           Morgan Catheter (x)No/ ()Yes?   Indication:             Central Line (x)No/ ()Yes?   Indication:          IV Fluids (x)No/ ()Yes? Type:  Rate:  Indication:    OBJECTIVE  PAST MEDICAL & SURGICAL HISTORY  DVT, lower extremity  Rheumatoid arthritis  Diastolic heart failure  Diverticulitis: sigmoid  Gout  Hypertension  Pacemaker  Chronic kidney disease  History of hysterectomy  History of tonsillectomy  History of appendectomy  Artificial pacemaker    ALLERGIES:  Keflex (Unknown)    HOME MEDICATIONS:  Home Medications:  apixaban 2.5 mg oral tablet: 1 tab(s) orally 2 times a day (14 Feb 2020 01:41)  atorvastatin 80 mg oral tablet: 1 tab(s) orally once a day (14 Feb 2020 01:41)  calcitriol 0.25 mcg oral capsule: 1 cap(s) orally once a day (14 Feb 2020 01:41)  furosemide 40 mg oral tablet: 1 tab(s) orally once a day (14 Feb 2020 01:41)  metoprolol succinate 25 mg oral tablet, extended release: 1 tab(s) orally once a day (14 Feb 2020 01:41)  sertraline 50 mg oral tablet: 1 tab(s) orally once a day (14 Feb 2020 01:41)    MEDICATIONS:  STANDING MEDICATIONS  apixaban 2.5 milliGRAM(s) Oral every 12 hours  aspirin  chewable 81 milliGRAM(s) Oral daily  atorvastatin 80 milliGRAM(s) Oral at bedtime  calcium acetate 1334 milliGRAM(s) Oral three times a day with meals  metoprolol succinate ER 25 milliGRAM(s) Oral daily  pantoprazole    Tablet 40 milliGRAM(s) Oral before breakfast  predniSONE   Tablet 50 milliGRAM(s) Oral daily  sertraline 50 milliGRAM(s) Oral daily  sodium bicarbonate 650 milliGRAM(s) Oral every 8 hours    PRN MEDICATIONS  aluminum hydroxide/magnesium hydroxide/simethicone Suspension 30 milliLiter(s) Oral every 6 hours PRN  nitroglycerin     SubLingual 0.4 milliGRAM(s) SubLingual every 5 minutes PRN  oxycodone    5 mG/acetaminophen 325 mG 1 Tablet(s) Oral every 6 hours PRN      VITAL SIGNS: Last 24 Hours  T(C): 35.7 (15 Feb 2020 05:09), Max: 36.6 (14 Feb 2020 16:05)  T(F): 96.3 (15 Feb 2020 05:09), Max: 97.9 (14 Feb 2020 16:05)  HR: 68 (15 Feb 2020 08:38) (61 - 95)  BP: 119/60 (15 Feb 2020 05:09) (119/60 - 146/73)  RR: 18 (15 Feb 2020 05:09) (18 - 18)  SpO2: 98% (15 Feb 2020 08:38) (98% - 99%)    LABS:                        9.4    19.03 )-----------( 326      ( 15 Feb 2020 08:19 )             29.3     02-14    133<L>  |  96<L>  |  64<HH>  ----------------------------<  154<H>  5.1<H>   |  18  |  2.8<H>    Ca    8.9      14 Feb 2020 07:16  Phos  5.1     02-14  Mg     2.0     02-14    TPro  6.4  /  Alb  3.2<L>  /  TBili  0.3  /  DBili  x   /  AST  41  /  ALT  22  /  AlkPhos  95  02-14    PT/INR - ( 14 Feb 2020 07:16 )   PT: 17.90 sec;   INR: 1.56 ratio      PTT - ( 14 Feb 2020 07:16 )  PTT:35.2 sec    CARDIAC MARKERS ( 14 Feb 2020 07:16 )  x     / 0.01 ng/mL / 101 U/L / x     / 1.8 ng/mL  CARDIAC MARKERS ( 13 Feb 2020 20:49 )  x     / 0.03 ng/mL / x     / x     / x        RADIOLOGY:  Xray Wrist 2 Views, Right (02.13.20 @ 22:08) >  1. Osteopenia without acute osseous abnormality.  2. Chronic/degenerative change as above. Apparent small erosions with chondrocalcinosis may be seen with underlying CPPD arthropathy.    < from: US Kidney and Bladder (02.14.20 @ 09:32) >  No hydronephrosis.    Post void residual of 0 cc.    Xray Chest 1 View AP/PA (02.13.20 @ 21:32) >  No consolidation, effusion or pneumothorax.      ECHO:  Transthoracic Echocardiogram:    EXAM:  2-D ECHO (TTE) COMPLETE      PROCEDURE DATE:  02/09/2020      INTERPRETATION:  REPORT:    TRANSTHORACIC ECHOCARDIOGRAM REPORT    Patient Name:   RACHEL SUMMERS Accession #: [id number]  Medical Rec #:  [id number]    Height:      63.0 in 160.0 cm  YOB: 1935     Weight:      145.0 lb 65.77 kg  Patient Age:    84 years     BSA:         1.69 m²  Patient Gender: F            BP:          135/70 mmHg       Date of Exam:        2/9/2020 3:28:49 PM  Referring Physician: [id number] DALAL  Sonographer:         Mary Root  Reading Physician:   Abdulkadir Desai M.D.    Procedure:   2D Echo/Doppler/Color Doppler Complete.  Indications: R06.00 - Dyspnea, unspecified  Diagnosis:   Dyspnea, unspecified - R06.00    Summary:   1. Left ventricular ejection fraction, by visual estimation, is 40 to 45%.   2. Mildly increased LV wall thickness.   3. Spectral Doppler shows impaired relaxation pattern of left ventricular myocardial filling (Grade I diastolic dysfunction).   4. Mild mitral valve regurgitation.    PHYSICIAN INTERPRETATION:  Left Ventricle: The left ventricular internal cavity size is normal. Left ventricular wall thickness is mildly increased. Left ventricular ejection fraction, by visual estimation, is 40to 45%. Spectral Doppler shows impaired relaxation pattern of left ventricular myocardial filling (Grade I diastolic dysfunction).  Right Ventricle: Normal right ventricular size and function.  Left Atrium: Mild to moderately enlarged left atrium.  Right Atrium: Normal right atrial size.  Pericardium: There is no evidence of pericardial effusion.  Mitral Valve: Structurally normal mitral valve, with normal leaflet excursion. The mitral valve is normal in structure. Mild mitral valve regurgitationis seen.  Tricuspid Valve: Structurally normal tricuspid valve, with normal leaflet excursion. The tricuspid valve is normal in structure. Mild tricuspid regurgitation is visualized.  Aortic Valve: Normal trileaflet aortic valve with normal opening. The aortic valve is normal. No evidence of aortic valve regurgitation is seen.  Pulmonic Valve: Structurally normal pulmonic valve, with normal leaflet excursion. The pulmonic valve is normal. Trace pulmonic valve regurgitation.  Aorta: The aortic root and ascending aorta are structurally normal, with no evidence of dilitation.  Pulmonary Artery: The main pulmonary artery is normal in size.  Additional Comments: A pacer wire is visualized in the right ventricle.    2D AND M-MODE MEASUREMENTS (normal ranges within parentheses):  Left                  Normal   Aorta/Left             Normal  Ventricle:                     Atrium:  IVSd (2D):  1.41 cm  (0.7-1.1) AoV Cusp       2.07  (1.5-2.6)  LVPWd (2D): 1.04 cm  (0.7-1.1) Separation:     cm  LVIDd (2D): 4.90 cm  (3.4-5.7) Left Atrium    5.02  (1.9-4.0)  LVIDs (2D): 4.11 cm            (Mmode):        cm  LV FS (2D):  16.2 %   (>25%)   LA Volume      24.6  IVSd        1.01 cm  (0.7-1.1) Index         ml/m²  (Mmode):                       Right  LVPWd       1.08 cm  (0.7-1.1) Ventricle:  (Mmode):                       RVd (2D):      3.03 cm  LVIDd       5.53 cm  (3.4-5.7)  (Mmode):  LVIDs       4.65 cm  (Mmode):  LV FS        15.8 %   (>25%)  (Mmode):  Relative      0.42    (<0.42)  Wall  Thickness  Rel. Wall     0.39    (<0.42)  Thickness  Mm  LV Mass      135.9  Index:        g/m²  Mmode    SPECTRAL DOPPLER ANALYSIS:  LV DIASTOLIC FUNCTION:  MV Peak E: 0.42 m/s Decel Time: 144 msec  MV Peak A: 1.03 m/s  E/A Ratio: 0.41    Aortic Valve:  AoV VMax:    1.46 m/s AoV Area, Vmax: 2.13 cm² Vmax Indx: 1.26 cm²/m²  AoV Pk Grad: 8.5 mmHg    LVOT Vmax: 0.99 m/s  LVOT VTI:  0.16 m  LVOT Diam: 2.00 cm    Mitral Valve:  MV P1/2 Time: 41.81 msec  MV Area, PHT: 5.26 cm²    Tricuspid Valve and PA/RV Systolic Pressure: TR Max Velocity: 2.63 m/s RA Pressure: 5 mmHg RVSP/PASP: 32.6 mmHg    Pulmonic Valve:  PV Max Velocity: 0.91 m/s PV Max PG: 3.3 mmHg PV Mean PG:       [id number] Abdulkadir Desai M.D., Electronically signed on 2/9/2020 at 4:46:29 PM    *** Final ***  ABDULKADIR DESAI MD  This document has been electronically signed. Feb 9 2020  3:28PM(02-09-20 @ 15:28)      PHYSICAL EXAM:    GENERAL: NAD, well-developed, AAOx3  HEENT:  Atraumatic, Normocephalic. EOMI, PERRLA, conjunctiva and sclera clear, No JVD  PULMONARY: Clear to auscultation bilaterally; No wheeze  CARDIOVASCULAR: Regular rate and rhythm; No murmurs, rubs, or gallops  GASTROINTESTINAL: Mild tenderness in epigastrium;  Soft, Nondistended; Bowel sounds present  MUSCULOSKELETAL:  Right wrist swelling with pain on passive ROM ; right knee swelling + non tender 2+ Peripheral Pulses, No clubbing, cyanosis  NEUROLOGY: non-focal  SKIN: No rashes or lesions    ASSESSMENT & PLAN  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, HFpEF, pacemaker placement, gout, rheumatoid arthritis and hypertension presenting for chest pain and right wrist pain     # Atypical left sided chest pain likely GERD, r/o pericardial effusion given RA flare  - non cardiac pain, reproducible  - Patient has non-ischemic cardiomyopathy, multiple cath in the past revealing no CAD and a stress test done one year ago was negative per the patient   - last echocardiogram was done 02/09/2020 showed EF 40 - 45 % and Grade I diastolic dysfunction. Echo from November 2019 showing EF 55 to 60 %.  - Troponin 0.03>>0.01  EKG showing ventricular paced rhythm   - Cardiology consult Dr. Mckinney : non cardiac, ECHO ordered to r/o effusion vc pericarditis likely RA flare,   - c/w Aspirin 81 mg daily, continue Lipitor and beta blockers        # Right wrist pain with inflammatory findings,likely  rheumatoid arthritis flare   - WBC Count: 19.03 <<11.00 <<12.34    - Was started on Clindamycin for possible cellulitis but discontinued   - continue Prednisone 50 mg daily  - wrist x ray : mod degenerative changes, chondrocalcinosis,  no fractures  - ESR: 126, CRP : 24.78  - Pain control with Percocet. Avoid NSAIDs given age, OMERO and cardiac history  - fu RF , anti CCP Ab  - rheum consulted    # right knee swelling:  -  fu Xray   - uric acid ordered  - pt has hx of gout    # MOERO on CKD 4  - Creatinine Trend: 2.8<--, 3.2<--, 2.3<--, 2.3<--, 2.4<--, 2.3<--baseline of 2.3   - Will hold Lasix for now as clinically not in volume overload   - US of the kidneys and bladder: no hydro, small kidneys  - Monitor urine output , please document (RN notified)  - Nephrology consult : MEt acidosis - start NA bicarb 650 mg po tid, started phoslo  - Calcitriol on hold given normal calcium level. 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 Will hold off on starting ACEi given OMERO, previous cardio notes also mentioning that patient is very sensitive to ACEi     # History of provoked DVT and PE (2019)  - Continue Eliquis 2.5 mg BID   - recent venous doppler of the lower extremities negative for DVT (02/07/2020)     # HTN   -  Continue Toprol 25 mg daily     # LUTS with leucocytosis UTI unlikely  - UA negative    # Miscellaneous   ·	DVT prophylaxis :  Eliquis   ·	GI prophylaxis : Protonix , maalox  ·	Activity : Ambulate with assistance , PT/Rehab  ·	Diet : DASH TLC renal diet   ·	Full code   Pending: PT/ Rehab, ECHO, RHeum consulted, Xray Knee

## 2020-02-16 LAB
ALBUMIN SERPL ELPH-MCNC: 3 G/DL — LOW (ref 3.5–5.2)
ALP SERPL-CCNC: 85 U/L — SIGNIFICANT CHANGE UP (ref 30–115)
ALT FLD-CCNC: 28 U/L — SIGNIFICANT CHANGE UP (ref 0–41)
ANION GAP SERPL CALC-SCNC: 12 MMOL/L — SIGNIFICANT CHANGE UP (ref 7–14)
AST SERPL-CCNC: 46 U/L — HIGH (ref 0–41)
BASOPHILS # BLD AUTO: 0.02 K/UL — SIGNIFICANT CHANGE UP (ref 0–0.2)
BASOPHILS NFR BLD AUTO: 0.1 % — SIGNIFICANT CHANGE UP (ref 0–1)
BILIRUB SERPL-MCNC: <0.2 MG/DL — SIGNIFICANT CHANGE UP (ref 0.2–1.2)
BUN SERPL-MCNC: 79 MG/DL — CRITICAL HIGH (ref 10–20)
CALCIUM SERPL-MCNC: 8.6 MG/DL — SIGNIFICANT CHANGE UP (ref 8.5–10.1)
CHLORIDE SERPL-SCNC: 97 MMOL/L — LOW (ref 98–110)
CO2 SERPL-SCNC: 23 MMOL/L — SIGNIFICANT CHANGE UP (ref 17–32)
CREAT SERPL-MCNC: 2.7 MG/DL — HIGH (ref 0.7–1.5)
CULTURE RESULTS: SIGNIFICANT CHANGE UP
EOSINOPHIL # BLD AUTO: 0.01 K/UL — SIGNIFICANT CHANGE UP (ref 0–0.7)
EOSINOPHIL NFR BLD AUTO: 0.1 % — SIGNIFICANT CHANGE UP (ref 0–8)
GLUCOSE SERPL-MCNC: 105 MG/DL — HIGH (ref 70–99)
HCT VFR BLD CALC: 28.3 % — LOW (ref 37–47)
HGB BLD-MCNC: 9.2 G/DL — LOW (ref 12–16)
IMM GRANULOCYTES NFR BLD AUTO: 1 % — HIGH (ref 0.1–0.3)
LYMPHOCYTES # BLD AUTO: 12.7 % — LOW (ref 20.5–51.1)
LYMPHOCYTES # BLD AUTO: 2.03 K/UL — SIGNIFICANT CHANGE UP (ref 1.2–3.4)
MAGNESIUM SERPL-MCNC: 2.3 MG/DL — SIGNIFICANT CHANGE UP (ref 1.8–2.4)
MCHC RBC-ENTMCNC: 29.4 PG — SIGNIFICANT CHANGE UP (ref 27–31)
MCHC RBC-ENTMCNC: 32.5 G/DL — SIGNIFICANT CHANGE UP (ref 32–37)
MCV RBC AUTO: 90.4 FL — SIGNIFICANT CHANGE UP (ref 81–99)
MONOCYTES # BLD AUTO: 1.26 K/UL — HIGH (ref 0.1–0.6)
MONOCYTES NFR BLD AUTO: 7.9 % — SIGNIFICANT CHANGE UP (ref 1.7–9.3)
NEUTROPHILS # BLD AUTO: 12.47 K/UL — HIGH (ref 1.4–6.5)
NEUTROPHILS NFR BLD AUTO: 78.2 % — HIGH (ref 42.2–75.2)
NRBC # BLD: 0 /100 WBCS — SIGNIFICANT CHANGE UP (ref 0–0)
PLATELET # BLD AUTO: 374 K/UL — SIGNIFICANT CHANGE UP (ref 130–400)
POTASSIUM SERPL-MCNC: 4.5 MMOL/L — SIGNIFICANT CHANGE UP (ref 3.5–5)
POTASSIUM SERPL-SCNC: 4.5 MMOL/L — SIGNIFICANT CHANGE UP (ref 3.5–5)
PROT SERPL-MCNC: 6 G/DL — SIGNIFICANT CHANGE UP (ref 6–8)
RBC # BLD: 3.13 M/UL — LOW (ref 4.2–5.4)
RBC # FLD: 12.2 % — SIGNIFICANT CHANGE UP (ref 11.5–14.5)
SODIUM SERPL-SCNC: 132 MMOL/L — LOW (ref 135–146)
SPECIMEN SOURCE: SIGNIFICANT CHANGE UP
WBC # BLD: 15.95 K/UL — HIGH (ref 4.8–10.8)
WBC # FLD AUTO: 15.95 K/UL — HIGH (ref 4.8–10.8)

## 2020-02-16 PROCEDURE — 93306 TTE W/DOPPLER COMPLETE: CPT | Mod: 26

## 2020-02-16 RX ORDER — CIPROFLOXACIN HCL 0.3 %
1 DROPS OPHTHALMIC (EYE)
Refills: 0 | Status: DISCONTINUED | OUTPATIENT
Start: 2020-02-16 | End: 2020-02-19

## 2020-02-16 RX ADMIN — PANTOPRAZOLE SODIUM 40 MILLIGRAM(S): 20 TABLET, DELAYED RELEASE ORAL at 06:46

## 2020-02-16 RX ADMIN — Medication 50 MILLIGRAM(S): at 05:20

## 2020-02-16 RX ADMIN — APIXABAN 2.5 MILLIGRAM(S): 2.5 TABLET, FILM COATED ORAL at 17:06

## 2020-02-16 RX ADMIN — Medication 81 MILLIGRAM(S): at 11:36

## 2020-02-16 RX ADMIN — Medication 25 MILLIGRAM(S): at 05:20

## 2020-02-16 RX ADMIN — APIXABAN 2.5 MILLIGRAM(S): 2.5 TABLET, FILM COATED ORAL at 05:20

## 2020-02-16 RX ADMIN — ATORVASTATIN CALCIUM 80 MILLIGRAM(S): 80 TABLET, FILM COATED ORAL at 22:16

## 2020-02-16 RX ADMIN — Medication 650 MILLIGRAM(S): at 22:16

## 2020-02-16 RX ADMIN — Medication 650 MILLIGRAM(S): at 05:20

## 2020-02-16 RX ADMIN — SERTRALINE 50 MILLIGRAM(S): 25 TABLET, FILM COATED ORAL at 11:36

## 2020-02-16 NOTE — PROGRESS NOTE ADULT - ASSESSMENT
Pt with CKD stage 4/5, HTN, RA, CHF, admitted with CP.    CKD - creat is close to baseline  - per notes  pt states she does not want HD if needed in the future, follows with Dr Urrutia  - ph noted, start phoslo 1 q 8   -kidney  sono  - small kidneys no hydro  - keep holding lasix , seem euvolemic on exam   -please document accurate UO   MEt acidosis - continue NA bicarb 650 mg po  Anemia - iron stores, no need for ELIJAH   cardiology notes appreciated   -2D Echo no pericardial effusion / diastolic dysfunction

## 2020-02-16 NOTE — PROGRESS NOTE ADULT - SUBJECTIVE AND OBJECTIVE BOX
Patient was seen and examined. Spoke with RN. Chart reviewed.  Seen lying in bad, comfortable, denies complaints.  No events overnight.    Vital Signs Last 24 Hrs  T(F): 96.4 (2020 12:06), Max: 97.5 (2020 05:15)  HR: 60 (2020 12:06) (60 - 65)  BP: 167/82 (2020 12:06) (128/62 - 167/82)  SpO2: 96% (2020 09:37) (96% - 100%)    MEDICATIONS  (STANDING):  apixaban 2.5 milliGRAM(s) Oral every 12 hours  aspirin  chewable 81 milliGRAM(s) Oral daily  atorvastatin 80 milliGRAM(s) Oral at bedtime  calcium acetate 1334 milliGRAM(s) Oral three times a day with meals  metoprolol succinate ER 25 milliGRAM(s) Oral daily  pantoprazole    Tablet 40 milliGRAM(s) Oral before breakfast  predniSONE   Tablet 50 milliGRAM(s) Oral daily  sertraline 50 milliGRAM(s) Oral daily  sodium bicarbonate 650 milliGRAM(s) Oral every 8 hours    MEDICATIONS  (PRN):  aluminum hydroxide/magnesium hydroxide/simethicone Suspension 30 milliLiter(s) Oral every 6 hours PRN Dyspepsia  nitroglycerin     SubLingual 0.4 milliGRAM(s) SubLingual every 5 minutes PRN Chest Pain  oxycodone    5 mG/acetaminophen 325 mG 1 Tablet(s) Oral every 6 hours PRN Severe Pain (7 - 10)    Labs:                        9.2    15.95 )-----------( 374      ( 2020 06:05 )             28.3                         9.4    19.03 )-----------( 326      ( 15 Feb 2020 08:19 )             29.3     2020 06:05    132    |  97     |  79     ----------------------------<  105    4.5     |  23     |  2.7    15 Feb 2020 08:19    132    |  95     |  73     ----------------------------<  150    5.3     |  21     |  2.8      Ca    8.6        2020 06:05  Ca    8.5        15 Feb 2020 08:19  Mg     2.3       2020 06:05  Mg     2.1       15 Feb 2020 08:19    TPro  6.0    /  Alb  3.0    /  TBili  <0.2   /  DBili  x      /  AST  46     /  ALT  28     /  AlkPhos  85     2020 06:05      Urinalysis Basic - ( 15 Feb 2020 18:20 )    Color: Light Yellow / Appearance: Clear / S.012 / pH: x  Gluc: x / Ketone: Negative  / Bili: Negative / Urobili: <2 mg/dL   Blood: x / Protein: 30 mg/dL / Nitrite: Negative   Leuk Esterase: Large / RBC: 6 /HPF / WBC 26 /HPF   Sq Epi: x / Non Sq Epi: 1 /HPF / Bacteria: Negative        General: Elderly F lying in bed, comfortable, NAD  Neurology: A&Ox3, nonfocal  Head:  Normocephalic, atraumatic  ENT:  Mucosa moist, no ulcerations  Neck:  Supple, no JVD,   Skin: no breakdowns (as per RN)  Resp: CTA B/L, no WRR  CV: RRR, S1S2, no MRG  GI: Soft, NT, bowel sounds+  MS: No edema, + peripheral pulses, FROM all 4 extremity    < from: Transthoracic Echocardiogram (20 @ 09:33) >  Summary:   1. Normal global left ventricular systolic function.   2. LV Ejection Fraction by Begum's Method with a biplane EF of 53 %.   3. Normal left ventricular internal cavity size.   4. Normal right atrial size.   5.Mild mitral annular calcification.   6. Mild thickening and calcification of the posterior mitral valve leaflet.   7. Mild to moderate mitral valve regurgitation.   8. Moderate tricuspid regurgitation.    < end of copied text >    A/P:  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, HFpEF, pacemaker placement, gout, rheumatoid arthritis and hypertension presenting for chest pain and right wrist pain     Atypical chest pain  Rheumatoid arthritis  OMERO on CKD  DVT/PE  HFpEF  PPM  Gout  HTN    echo results as above; no effusion  cont prednisone  cont ASA, BB, statin  Nephro consult appreciated  cont Bicarb  Monitor BMP  cont Eliquis  DVT prophylaxis  Decubitus prevention- all measures as per RN protocol  Discussed with covering resident  Please call or text me with any questions or updates at [phone]

## 2020-02-16 NOTE — CHART NOTE - NSCHARTNOTEFT_GEN_A_CORE
The patient reports bilateral ear pain (R>L) . She notes that she has history of otitis with the last one few years ago for which she received ear drops previously with relief in symptoms. I personally examined the patient. She has reproducible ache upon pulling on the tragus with pain being more pronounced on the R >L. I did not appreciate any drainage or visible discoloration/ rash. Will start Ciprodex. Consider ENT consult. The patient reports bilateral ear pain (R>L) . She notes that she has history of otitis with the last one few years ago for which she received ear drops previously with relief in symptoms. I personally examined the patient. She has reproducible ache upon pulling on the tragus with pain being more pronounced on the R >L. I did not appreciate any drainage or visible discoloration/ rash. Will start Ciprofloxacin drops. Consider ENT consult. The patient reports bilateral ear pain (R>L) . She notes that she has history of otitis with the last one few years ago for which she received ear drops previously with relief in symptoms. I personally examined the patient. She has reproducible ache upon pulling on the tragus with pain being more pronounced on the R >L. I did not appreciate any drainage or visible discoloration/ rash. Will start Ciprofloxacin drops. Consider ENT consult.    The patient also reports productive cough of yellow sputum which started earlier in the day. I examined the patient personally and lung exam was unremarkable (no wheezing/rhonchi/ rales appreciated; she is clear to auscultation bilaterally) as well as good air entry b/l. No fevers. She has leukocytosis, however she is on prednisone 50 mg daily. Will get CXR and re-evaluate. In the meantime will hold off on antibiotics.

## 2020-02-16 NOTE — PROGRESS NOTE ADULT - SUBJECTIVE AND OBJECTIVE BOX
24H events:    Patient is a 84y old Female who presents with a chief complaint of Chest pain, right wrist pain (15 Feb 2020 11:38)    Primary diagnosis of Chest pain     Today is hospital day 2d. This morning patient was seen and examined at bedside, resting comfortably in bed.      PAST MEDICAL & SURGICAL HISTORY  DVT, lower extremity  Rheumatoid arthritis  Diastolic heart failure  Diverticulitis: sigmoid  Gout  Hypertension  Pacemaker  Chronic kidney disease  History of hysterectomy  History of tonsillectomy  History of appendectomy  Artificial pacemaker    SOCIAL HISTORY:  Social History:  patient denies smoking, alcohol use and dug use (2020 01:29)      ALLERGIES:  Keflex (Unknown)    MEDICATIONS:  STANDING MEDICATIONS  apixaban 2.5 milliGRAM(s) Oral every 12 hours  aspirin  chewable 81 milliGRAM(s) Oral daily  atorvastatin 80 milliGRAM(s) Oral at bedtime  calcium acetate 1334 milliGRAM(s) Oral three times a day with meals  metoprolol succinate ER 25 milliGRAM(s) Oral daily  pantoprazole    Tablet 40 milliGRAM(s) Oral before breakfast  predniSONE   Tablet 50 milliGRAM(s) Oral daily  sertraline 50 milliGRAM(s) Oral daily  sodium bicarbonate 650 milliGRAM(s) Oral every 8 hours    PRN MEDICATIONS  aluminum hydroxide/magnesium hydroxide/simethicone Suspension 30 milliLiter(s) Oral every 6 hours PRN  nitroglycerin     SubLingual 0.4 milliGRAM(s) SubLingual every 5 minutes PRN  oxycodone    5 mG/acetaminophen 325 mG 1 Tablet(s) Oral every 6 hours PRN    VITALS:   T(F): 97.5  HR: 61  BP: 144/65  RR: 18  SpO2: 96%    LABS:                        9.2    15.95 )-----------( 374      ( 2020 06:05 )             28.3     02-16    132<L>  |  97<L>  |  79<HH>  ----------------------------<  105<H>  4.5   |  23  |  2.7<H>    Ca    8.6      2020 06:05  Mg     2.3     02-16    TPro  6.0  /  Alb  3.0<L>  /  TBili  <0.2  /  DBili  x   /  AST  46<H>  /  ALT  28  /  AlkPhos  85  02-16      Urinalysis Basic - ( 15 Feb 2020 18:20 )    Color: Light Yellow / Appearance: Clear / S.012 / pH: x  Gluc: x / Ketone: Negative  / Bili: Negative / Urobili: <2 mg/dL   Blood: x / Protein: 30 mg/dL / Nitrite: Negative   Leuk Esterase: Large / RBC: 6 /HPF / WBC 26 /HPF   Sq Epi: x / Non Sq Epi: 1 /HPF / Bacteria: Negative        RADIOLOGY:  < from: Xray Knee 3 Views, Bilateral (02.15.20 @ 13:49) >  impression:    Bilateral: Since prior, no acute displaced fracture or interval change. Bones are osteopenic. Tricompartmental bilateral joint space narrowing/severe degenerative change with articular flattening unchanged since prior. Bilateral small suprapatellar joint effusions also unchanged. Vascular calcifications present.    < end of copied text >      Xray Wrist 2 Views, Right (20 @ 22:08) >  1. Osteopenia without acute osseous abnormality.  2. Chronic/degenerative change as above. Apparent small erosions with chondrocalcinosis may be seen with underlying CPPD arthropathy.    < from: US Kidney and Bladder (20 @ 09:32) >  No hydronephrosis.    Post void residual of 0 cc.    Xray Chest 1 View AP/PA (20 @ 21:32) >  No consolidation, effusion or pneumothorax.      ECHO:  Transthoracic Echocardiogram:    EXAM:  2-D ECHO (TTE) COMPLETE      PROCEDURE DATE:  2020      INTERPRETATION:  REPORT:    TRANSTHORACIC ECHOCARDIOGRAM REPORT    Patient Name:   RACHEL SUMMERS Accession #: [id number]  Medical Rec #:  [id number]    Height:      63.0 in 160.0 cm  YOB: 1935     Weight:      145.0 lb 65.77 kg  Patient Age:    84 years     BSA:         1.69 m²  Patient Gender: F            BP:          135/70 mmHg       Date of Exam:        2020 3:28:49 PM  Referring Physician: [id number] REJI DALAL  Sonographer:         Mary Root  Reading Physician:   Abdulkadir Desai M.D.    Procedure:   2D Echo/Doppler/Color Doppler Complete.  Indications: R06.00 - Dyspnea, unspecified  Diagnosis:   Dyspnea, unspecified - R06.00    Summary:   1. Left ventricular ejection fraction, by visual estimation, is 40 to 45%.   2. Mildly increased LV wall thickness.   3. Spectral Doppler shows impaired relaxation pattern of left ventricular myocardial filling (Grade I diastolic dysfunction).   4. Mild mitral valve regurgitation.    PHYSICIAN INTERPRETATION:  Left Ventricle: The left ventricular internal cavity size is normal. Left ventricular wall thickness is mildly increased. Left ventricular ejection fraction, by visual estimation, is 40to 45%. Spectral Doppler shows impaired relaxation pattern of left ventricular myocardial filling (Grade I diastolic dysfunction).  Right Ventricle: Normal right ventricular size and function.  Left Atrium: Mild to moderately enlarged left atrium.  Right Atrium: Normal right atrial size.  Pericardium: There is no evidence of pericardial effusion.  Mitral Valve: Structurally normal mitral valve, with normal leaflet excursion. The mitral valve is normal in structure. Mild mitral valve regurgitationis seen.  Tricuspid Valve: Structurally normal tricuspid valve, with normal leaflet excursion. The tricuspid valve is normal in structure. Mild tricuspid regurgitation is visualized.  Aortic Valve: Normal trileaflet aortic valve with normal opening. The aortic valve is normal. No evidence of aortic valve regurgitation is seen.  Pulmonic Valve: Structurally normal pulmonic valve, with normal leaflet excursion. The pulmonic valve is normal. Trace pulmonic valve regurgitation.  Aorta: The aortic root and ascending aorta are structurally normal, with no evidence of dilitation.  Pulmonary Artery: The main pulmonary artery is normal in size.  Additional Comments: A pacer wire is visualized in the right ventricle.    2D AND M-MODE MEASUREMENTS (normal ranges within parentheses):  Left                  Normal   Aorta/Left             Normal  Ventricle:                     Atrium:  IVSd (2D):  1.41 cm  (0.7-1.1) AoV Cusp       2.07  (1.5-2.6)  LVPWd (2D): 1.04 cm  (0.7-1.1) Separation:     cm  LVIDd (2D): 4.90 cm  (3.4-5.7) Left Atrium    5.02  (1.9-4.0)  LVIDs (2D): 4.11 cm            (Mmode):        cm  LV FS (2D):  16.2 %   (>25%)   LA Volume      24.6  IVSd        1.01 cm  (0.7-1.1) Index         ml/m²  (Mmode):                       Right  LVPWd       1.08 cm  (0.7-1.1) Ventricle:  (Mmode):                       RVd (2D):      3.03 cm  LVIDd       5.53 cm  (3.4-5.7)  (Mmode):  LVIDs       4.65 cm  (Mmode):  LV FS        15.8 %   (>25%)  (Mmode):  Relative      0.42    (<0.42)  Wall  Thickness  Rel. Wall     0.39    (<0.42)  Thickness  Mm  LV Mass      135.9  Index:        g/m²  Mmode    SPECTRAL DOPPLER ANALYSIS:  LV DIASTOLIC FUNCTION:  MV Peak E: 0.42 m/s Decel Time: 144 msec  MV Peak A: 1.03 m/s  E/A Ratio: 0.41    Aortic Valve:  AoV VMax:    1.46 m/s AoV Area, Vmax: 2.13 cm² Vmax Indx: 1.26 cm²/m²  AoV Pk Grad: 8.5 mmHg    LVOT Vmax: 0.99 m/s  LVOT VTI:  0.16 m  LVOT Diam: 2.00 cm    Mitral Valve:  MV P1/2 Time: 41.81 msec  MV Area, PHT: 5.26 cm²    Tricuspid Valve and PA/RV Systolic Pressure: TR Max Velocity: 2.63 m/s RA Pressure: 5 mmHg RVSP/PASP: 32.6 mmHg    Pulmonic Valve:  PV Max Velocity: 0.91 m/s PV Max PG: 3.3 mmHg PV Mean PG:       [id number] Abdulkadir Desai M.D., Electronically signed on 2020 at 4:46:29 PM    *** Final ***  ABDULKADIR DESAI MD  This document has been electronically signed. 2020  3:28PM(20 @ 15:28)      PHYSICAL EXAM:    GENERAL: NAD, well-developed, AAOx3  HEENT:  Atraumatic, Normocephalic. EOMI, PERRLA, conjunctiva and sclera clear, No JVD  PULMONARY: Clear to auscultation bilaterally; No wheeze  CARDIOVASCULAR: Regular rate and rhythm; No murmurs, rubs, or gallops  GASTROINTESTINAL: Mild tenderness in epigastrium;  Soft, Nondistended; Bowel sounds present  MUSCULOSKELETAL:  Right wrist swelling with pain on passive ROM ; right knee swelling + non tender 2+ Peripheral Pulses, No clubbing, cyanosis  NEUROLOGY: non-focal  SKIN: No rashes or lesions    ASSESSMENT & PLAN  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, HFpEF, pacemaker placement, gout, rheumatoid arthritis and hypertension presenting for chest pain and right wrist pain     # Atypical left sided chest pain likely GERD, r/o pericardial effusion given RA flare  - non cardiac pain, reproducible  - Patient has non-ischemic cardiomyopathy, multiple cath in the past revealing no CAD and a stress test done one year ago was negative per the patient   - last echocardiogram was done 2020 showed EF 40 - 45 % and Grade I diastolic dysfunction. Echo from 2019 showing EF 55 to 60 %.  - Troponin 0.03>>0.01  EKG showing ventricular paced rhythm   - Cardiology consult Dr. Mckinney : non cardiac, ECHO ordered to r/o effusion vc pericarditis likely RA flare,   - c/w Aspirin 81 mg daily, continue Lipitor and beta blockers        # Right wrist pain with inflammatory findings,likely  rheumatoid arthritis flare   - WBC Count: 19.03 <<11.00 <<12.34    - Was started on Clindamycin for possible cellulitis but discontinued   - continue Prednisone 50 mg daily  - wrist x ray : mod degenerative changes, chondrocalcinosis,  no fractures  - ESR: 126, CRP : 24.78  - Pain control with Percocet. Avoid NSAIDs given age, OMERO and cardiac history  - fu RF , anti CCP Ab  - rheum consulted    # right knee swelling:  -  Xray as above    - uric acid ordered  - pt has hx of gout    # OMERO on CKD 4  - Creatinine Trend:2.7<-- 2.8<--, 3.2<--, 2.3<--, 2.3<--, 2.4<--, 2.3<--baseline of 2.3   - Will hold Lasix for now as clinically not in volume overload   - US of the kidneys and bladder: no hydro, small kidneys  - Monitor urine output , please document (RN notified)  - Nephrology consult : MEt acidosis - cw NA bicarb 650 mg po tid, cw phoslo, accurate uo  - Calcitriol on hold given normal calcium level. 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 Will hold off on starting ACEi given OMERO, previous cardio notes also mentioning that patient is very sensitive to ACEi     # History of provoked DVT and PE (2019)  - Continue Eliquis 2.5 mg BID   - recent venous doppler of the lower extremities negative for DVT (2020)     # HTN   -  Continue Toprol 25 mg daily     # LUTS with leucocytosis UTI unlikely  - UA negative    #DVT prophylaxis :  Eliquis   #GI prophylaxis : Protonix , maalox  #Activity : Ambulate with assistance , PT/Rehab  #Diet : DASH TLC renal diet   #Full code   Pending: PT/ Rehab, ECHO, Rheum consulted

## 2020-02-16 NOTE — PROGRESS NOTE ADULT - SUBJECTIVE AND OBJECTIVE BOX
Nephrology progress note    Patient was seen and examined, events over the last 24 h noted .  cr stable     Allergies:  Keflex (Unknown)    Hospital Medications:   MEDICATIONS  (STANDING):  apixaban 2.5 milliGRAM(s) Oral every 12 hours  aspirin  chewable 81 milliGRAM(s) Oral daily  atorvastatin 80 milliGRAM(s) Oral at bedtime  calcium acetate 1334 milliGRAM(s) Oral three times a day with meals  metoprolol succinate ER 25 milliGRAM(s) Oral daily  pantoprazole    Tablet 40 milliGRAM(s) Oral before breakfast  predniSONE   Tablet 50 milliGRAM(s) Oral daily  sertraline 50 milliGRAM(s) Oral daily  sodium bicarbonate 650 milliGRAM(s) Oral every 8 hours        VITALS:  T(F): 97.5 (20 @ 05:15), Max: 97.5 (02-15-20 @ 12:27)  HR: 61 (20 @ 09:37)  BP: 144/65 (20 @ 05:15)  RR: 18 (20 @ 05:15)  SpO2: 96% (20 @ 09:37)  Wt(kg): --     @ 07:01  -  02-15 @ 07:00  --------------------------------------------------------  IN: 336 mL / OUT: 200 mL / NET: 136 mL    02-15 @ 07:01  -   @ 07:00  --------------------------------------------------------  IN: 0 mL / OUT: 650 mL / NET: -650 mL          PHYSICAL EXAM:  	Gen: NAD  	Pulm: decrease BS  B/L  	CV: S1S2; no rub  	Abd: +distended  	LE: no edema  LABS:      132<L>  |  97<L>  |  79<HH>  ----------------------------<  105<H>  4.5   |  23  |  2.7<H>    Ca    8.6      2020 06:05  Mg     2.3         TPro  6.0  /  Alb  3.0<L>  /  TBili  <0.2  /  DBili      /  AST  46<H>  /  ALT  28  /  AlkPhos  85                            9.2    15.95 )-----------( 374      ( 2020 06:05 )             28.3       Urine Studies:  Urinalysis Basic - ( 15 Feb 2020 18:20 )    Color: Light Yellow / Appearance: Clear / S.012 / pH:   Gluc:  / Ketone: Negative  / Bili: Negative / Urobili: <2 mg/dL   Blood:  / Protein: 30 mg/dL / Nitrite: Negative   Leuk Esterase: Large / RBC: 6 /HPF / WBC 26 /HPF   Sq Epi:  / Non Sq Epi: 1 /HPF / Bacteria: Negative      Osmolality, Random Urine: 301 mos/kg (02-15 @ 18:20)  Sodium, Random Urine: <20.0 mmoL/L (02-15 @ 18:20)  Creatinine, Random Urine: 76 mg/dL (02-15 @ 18:20)  Protein/Creatinine Ratio Calculation: 0.4 Ratio (02-15 @ 18:20)    RADIOLOGY & ADDITIONAL STUDIES:

## 2020-02-17 LAB
ALBUMIN SERPL ELPH-MCNC: 3 G/DL — LOW (ref 3.5–5.2)
ALP SERPL-CCNC: 88 U/L — SIGNIFICANT CHANGE UP (ref 30–115)
ALT FLD-CCNC: 24 U/L — SIGNIFICANT CHANGE UP (ref 0–41)
ANION GAP SERPL CALC-SCNC: 15 MMOL/L — HIGH (ref 7–14)
AST SERPL-CCNC: 32 U/L — SIGNIFICANT CHANGE UP (ref 0–41)
BASOPHILS # BLD AUTO: 0.03 K/UL — SIGNIFICANT CHANGE UP (ref 0–0.2)
BASOPHILS NFR BLD AUTO: 0.2 % — SIGNIFICANT CHANGE UP (ref 0–1)
BILIRUB SERPL-MCNC: <0.2 MG/DL — SIGNIFICANT CHANGE UP (ref 0.2–1.2)
BUN SERPL-MCNC: 76 MG/DL — CRITICAL HIGH (ref 10–20)
CALCIUM SERPL-MCNC: 8.5 MG/DL — SIGNIFICANT CHANGE UP (ref 8.5–10.1)
CHLORIDE SERPL-SCNC: 96 MMOL/L — LOW (ref 98–110)
CO2 SERPL-SCNC: 21 MMOL/L — SIGNIFICANT CHANGE UP (ref 17–32)
CREAT SERPL-MCNC: 2.6 MG/DL — HIGH (ref 0.7–1.5)
EOSINOPHIL # BLD AUTO: 0.04 K/UL — SIGNIFICANT CHANGE UP (ref 0–0.7)
EOSINOPHIL NFR BLD AUTO: 0.3 % — SIGNIFICANT CHANGE UP (ref 0–8)
GLUCOSE BLDC GLUCOMTR-MCNC: 103 MG/DL — HIGH (ref 70–99)
GLUCOSE BLDC GLUCOMTR-MCNC: 110 MG/DL — HIGH (ref 70–99)
GLUCOSE BLDC GLUCOMTR-MCNC: 113 MG/DL — HIGH (ref 70–99)
GLUCOSE BLDC GLUCOMTR-MCNC: 114 MG/DL — HIGH (ref 70–99)
GLUCOSE SERPL-MCNC: 104 MG/DL — HIGH (ref 70–99)
HCT VFR BLD CALC: 30.8 % — LOW (ref 37–47)
HGB BLD-MCNC: 9.7 G/DL — LOW (ref 12–16)
IMM GRANULOCYTES NFR BLD AUTO: 2.3 % — HIGH (ref 0.1–0.3)
LYMPHOCYTES # BLD AUTO: 2.82 K/UL — SIGNIFICANT CHANGE UP (ref 1.2–3.4)
LYMPHOCYTES # BLD AUTO: 20.8 % — SIGNIFICANT CHANGE UP (ref 20.5–51.1)
MAGNESIUM SERPL-MCNC: 2.4 MG/DL — SIGNIFICANT CHANGE UP (ref 1.8–2.4)
MCHC RBC-ENTMCNC: 29.1 PG — SIGNIFICANT CHANGE UP (ref 27–31)
MCHC RBC-ENTMCNC: 31.5 G/DL — LOW (ref 32–37)
MCV RBC AUTO: 92.5 FL — SIGNIFICANT CHANGE UP (ref 81–99)
MONOCYTES # BLD AUTO: 1.38 K/UL — HIGH (ref 0.1–0.6)
MONOCYTES NFR BLD AUTO: 10.2 % — HIGH (ref 1.7–9.3)
NEUTROPHILS # BLD AUTO: 8.96 K/UL — HIGH (ref 1.4–6.5)
NEUTROPHILS NFR BLD AUTO: 66.2 % — SIGNIFICANT CHANGE UP (ref 42.2–75.2)
NRBC # BLD: 0 /100 WBCS — SIGNIFICANT CHANGE UP (ref 0–0)
PLATELET # BLD AUTO: 418 K/UL — HIGH (ref 130–400)
POTASSIUM SERPL-MCNC: 4.9 MMOL/L — SIGNIFICANT CHANGE UP (ref 3.5–5)
POTASSIUM SERPL-SCNC: 4.9 MMOL/L — SIGNIFICANT CHANGE UP (ref 3.5–5)
PROT SERPL-MCNC: 6.2 G/DL — SIGNIFICANT CHANGE UP (ref 6–8)
RBC # BLD: 3.33 M/UL — LOW (ref 4.2–5.4)
RBC # FLD: 12.5 % — SIGNIFICANT CHANGE UP (ref 11.5–14.5)
SODIUM SERPL-SCNC: 132 MMOL/L — LOW (ref 135–146)
WBC # BLD: 13.54 K/UL — HIGH (ref 4.8–10.8)
WBC # FLD AUTO: 13.54 K/UL — HIGH (ref 4.8–10.8)

## 2020-02-17 PROCEDURE — 71045 X-RAY EXAM CHEST 1 VIEW: CPT | Mod: 26

## 2020-02-17 RX ORDER — ACETAMINOPHEN 500 MG
650 TABLET ORAL EVERY 6 HOURS
Refills: 0 | Status: DISCONTINUED | OUTPATIENT
Start: 2020-02-17 | End: 2020-02-19

## 2020-02-17 RX ADMIN — Medication 1 DROP(S): at 06:00

## 2020-02-17 RX ADMIN — APIXABAN 2.5 MILLIGRAM(S): 2.5 TABLET, FILM COATED ORAL at 17:21

## 2020-02-17 RX ADMIN — Medication 50 MILLIGRAM(S): at 05:53

## 2020-02-17 RX ADMIN — Medication 81 MILLIGRAM(S): at 11:04

## 2020-02-17 RX ADMIN — Medication 30 MILLILITER(S): at 13:49

## 2020-02-17 RX ADMIN — Medication 650 MILLIGRAM(S): at 13:47

## 2020-02-17 RX ADMIN — Medication 1334 MILLIGRAM(S): at 17:22

## 2020-02-17 RX ADMIN — Medication 1 DROP(S): at 17:22

## 2020-02-17 RX ADMIN — Medication 650 MILLIGRAM(S): at 13:33

## 2020-02-17 RX ADMIN — APIXABAN 2.5 MILLIGRAM(S): 2.5 TABLET, FILM COATED ORAL at 05:52

## 2020-02-17 RX ADMIN — PANTOPRAZOLE SODIUM 40 MILLIGRAM(S): 20 TABLET, DELAYED RELEASE ORAL at 06:01

## 2020-02-17 RX ADMIN — SERTRALINE 50 MILLIGRAM(S): 25 TABLET, FILM COATED ORAL at 11:03

## 2020-02-17 RX ADMIN — Medication 30 MILLILITER(S): at 20:49

## 2020-02-17 RX ADMIN — Medication 25 MILLIGRAM(S): at 05:52

## 2020-02-17 RX ADMIN — Medication 650 MILLIGRAM(S): at 05:53

## 2020-02-17 RX ADMIN — Medication 200 MILLIGRAM(S): at 05:53

## 2020-02-17 RX ADMIN — Medication 1334 MILLIGRAM(S): at 08:33

## 2020-02-17 RX ADMIN — Medication 1334 MILLIGRAM(S): at 12:28

## 2020-02-17 NOTE — PROGRESS NOTE ADULT - SUBJECTIVE AND OBJECTIVE BOX
Patient was seen and examined. Spoke with RN. Chart reviewed.  reports improvement in symptoms. Complains of ear pain last night  No events overnight.    Vital Signs Last 24 Hrs  T(F): 96.5 (2020 04:54), Max: 98.1 (2020 19:52)  HR: 61 (2020 04:54) (60 - 61)  BP: 150/75 (2020 04:54) (127/63 - 167/82)  SpO2: 99% (2020 01:08) (99% - 99%)    MEDICATIONS  (STANDING):  apixaban 2.5 milliGRAM(s) Oral every 12 hours  aspirin  chewable 81 milliGRAM(s) Oral daily  atorvastatin 80 milliGRAM(s) Oral at bedtime  calcium acetate 1334 milliGRAM(s) Oral three times a day with meals  ciprofloxacin  0.3% Otic Solution 1 Drop(s) Both Ears two times a day  metoprolol succinate ER 25 milliGRAM(s) Oral daily  pantoprazole    Tablet 40 milliGRAM(s) Oral before breakfast  predniSONE   Tablet 50 milliGRAM(s) Oral daily  sertraline 50 milliGRAM(s) Oral daily  sodium bicarbonate 650 milliGRAM(s) Oral every 8 hours    MEDICATIONS  (PRN):  aluminum hydroxide/magnesium hydroxide/simethicone Suspension 30 milliLiter(s) Oral every 6 hours PRN Dyspepsia  benzonatate 200 milliGRAM(s) Oral three times a day PRN Cough  nitroglycerin     SubLingual 0.4 milliGRAM(s) SubLingual every 5 minutes PRN Chest Pain  oxycodone    5 mG/acetaminophen 325 mG 1 Tablet(s) Oral every 6 hours PRN Severe Pain (7 - 10)    Labs:                        9.7    13.54 )-----------( 418      ( 2020 08:30 )             30.8                         9.2    15.95 )-----------( 374      ( 2020 06:05 )             28.3     2020 08:30    132    |  96     |  76     ----------------------------<  104    4.9     |  21     |  2.6    2020 06:05    132    |  97     |  79     ----------------------------<  105    4.5     |  23     |  2.7      Ca    8.5        2020 08:30  Ca    8.6        2020 06:05  Mg     2.4       2020 08:30  Mg     2.3       2020 06:05    TPro  6.2    /  Alb  3.0    /  TBili  <0.2   /  DBili  x      /  AST  32     /  ALT  24     /  AlkPhos  88     2020 08:30  TPro  6.0    /  Alb  3.0    /  TBili  <0.2   /  DBili  x      /  AST  46     /  ALT  28     /  AlkPhos  85     2020 06:05      Urinalysis Basic - ( 15 Feb 2020 18:20 )    Color: Light Yellow / Appearance: Clear / S.012 / pH: x  Gluc: x / Ketone: Negative  / Bili: Negative / Urobili: <2 mg/dL   Blood: x / Protein: 30 mg/dL / Nitrite: Negative   Leuk Esterase: Large / RBC: 6 /HPF / WBC 26 /HPF   Sq Epi: x / Non Sq Epi: 1 /HPF / Bacteria: Negative        Culture - Urine (collected 15 Feb 2020 18:20)  Source: .Urine Clean Catch (Midstream)  Final Report (2020 20:39):    <10,000 CFU/mL Normal Urogenital Rand      General: Appears comfortable, sitting in chaircomfortable, NAD  Neurology: A&Ox3, nonfocal  Head:  Normocephalic, atraumatic  ENT:  Mucosa moist, no ulcerations  Neck:  Supple, no JVD,   Skin: no breakdowns (as per RN)  Resp: CTA B/L, no WRR  CV: RRR, S1S2, no MRG  GI: Soft, NT, bowel sounds+  MS: No edema, + peripheral pulses, FROM all 4 extremity      A/P:  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

## 2020-02-17 NOTE — PROGRESS NOTE ADULT - SUBJECTIVE AND OBJECTIVE BOX
LENGTH OF HOSPITAL STAY: 3d    CHIEF COMPLAINT:   Patient is a 84y old  Female who presents with a chief complaint of Chest pain, right wrist pain (2020 07:57)      Overnight events:    No acute events overnight    ALLERGIES:  Keflex (Unknown)    MEDICATIONS:  STANDING MEDICATIONS  apixaban 2.5 milliGRAM(s) Oral every 12 hours  aspirin  chewable 81 milliGRAM(s) Oral daily  atorvastatin 80 milliGRAM(s) Oral at bedtime  calcium acetate 1334 milliGRAM(s) Oral three times a day with meals  ciprofloxacin  0.3% Otic Solution 1 Drop(s) Both Ears two times a day  metoprolol succinate ER 25 milliGRAM(s) Oral daily  pantoprazole    Tablet 40 milliGRAM(s) Oral before breakfast  predniSONE   Tablet 50 milliGRAM(s) Oral daily  sertraline 50 milliGRAM(s) Oral daily  sodium bicarbonate 650 milliGRAM(s) Oral every 8 hours    PRN MEDICATIONS  aluminum hydroxide/magnesium hydroxide/simethicone Suspension 30 milliLiter(s) Oral every 6 hours PRN  benzonatate 200 milliGRAM(s) Oral three times a day PRN  nitroglycerin     SubLingual 0.4 milliGRAM(s) SubLingual every 5 minutes PRN  oxycodone    5 mG/acetaminophen 325 mG 1 Tablet(s) Oral every 6 hours PRN    VITALS:   T(F): 96.5  HR: 61  BP: 150/75  RR: 18  SpO2: 99%    LABS:                        9.7    13.54 )-----------( 418      ( 2020 08:30 )             30.8     02-17    132<L>  |  96<L>  |  76<HH>  ----------------------------<  104<H>  4.9   |  21  |  2.6<H>    Ca    8.5      2020 08:30  Mg     2.4     02-17    TPro  6.2  /  Alb  3.0<L>  /  TBili  <0.2  /  DBili  x   /  AST  32  /  ALT  24  /  AlkPhos  88        Urinalysis Basic - ( 15 Feb 2020 18:20 )    Color: Light Yellow / Appearance: Clear / S.012 / pH: x  Gluc: x / Ketone: Negative  / Bili: Negative / Urobili: <2 mg/dL   Blood: x / Protein: 30 mg/dL / Nitrite: Negative   Leuk Esterase: Large / RBC: 6 /HPF / WBC 26 /HPF   Sq Epi: x / Non Sq Epi: 1 /HPF / Bacteria: Negative            Culture - Urine (collected 15 Feb 2020 18:20)  Source: .Urine Clean Catch (Midstream)  Final Report (2020 20:39):    <10,000 CFU/mL Normal Urogenital Rand            Cultures:    Culture - Urine (collected 15 Feb 2020 18:20)  Source: .Urine Clean Catch (Midstream)  Final Report (2020 20:39):    <10,000 CFU/mL Normal Urogenital Rand        RADIOLOGY:    PHYSICAL EXAM:  GEN: No acute distress  HEENT: CASH  LUNGS: Clear to auscultation bilaterally   HEART: S1/S2 present. RRR.   ABD: Soft, mild epigastric tenderness, non-distended. Bowel sounds present  EXT:  Right wrist swelling with pain on passive ROM ; right knee swelling + non tender 2+ Peripheral Pulses, No clubbing, cyanosis  NEURO: AAOX3

## 2020-02-17 NOTE — PHYSICAL THERAPY INITIAL EVALUATION ADULT - PERTINENT HX OF CURRENT PROBLEM, REHAB EVAL
Pt is an 83yo F presenting for chest pain and R wrist pain. PMHx: CKD, DVT / PE on Eliquis, HFpEF, pacemaker placement, gout, rheumatoid arthritis and hypertension

## 2020-02-17 NOTE — PROGRESS NOTE ADULT - ASSESSMENT
Pt with CKD stage 4/5, HTN, RA, CHF, admitted with CP.    CKD - creat stable   - per notes  pt states she does not want HD if needed in the future, follows with Dr Urrutia  - ph 5.1 , on binders   -kidney  sono  - small kidneys no hydro  - keep holding lasix , seem euvolemic on exam   -non oliguric   MEt acidosis - continue NA bicarb 650 mg po  Anemia - iron stores, no need for ELIJAH   -2D Echo no pericardial effusion / diastolic dysfunction   - will follow

## 2020-02-17 NOTE — PHYSICAL THERAPY INITIAL EVALUATION ADULT - GENERAL OBSERVATIONS, REHAB EVAL
14:30-15:15. Pt encountered semifowlers in bed in NAD. +Pt agreeable to PT IE. PT IE Complete. Pt performed bed mobility, transfer training, and gait training. Pt educated on seated/supine therex. Pt educated to continue ambulation with [facility] staff. PT to f/u.

## 2020-02-17 NOTE — PROGRESS NOTE ADULT - SUBJECTIVE AND OBJECTIVE BOX
seen and examined  no distress        PAST HISTORY  --------------------------------------------------------------------------------  No significant changes to PMH, PSH, FHx, SHx, unless otherwise noted    ALLERGIES & MEDICATIONS  --------------------------------------------------------------------------------  Allergies    Keflex (Unknown)    Intolerances      Standing Inpatient Medications  apixaban 2.5 milliGRAM(s) Oral every 12 hours  aspirin  chewable 81 milliGRAM(s) Oral daily  atorvastatin 80 milliGRAM(s) Oral at bedtime  calcium acetate 1334 milliGRAM(s) Oral three times a day with meals  ciprofloxacin  0.3% Otic Solution 1 Drop(s) Both Ears two times a day  metoprolol succinate ER 25 milliGRAM(s) Oral daily  pantoprazole    Tablet 40 milliGRAM(s) Oral before breakfast  predniSONE   Tablet 50 milliGRAM(s) Oral daily  sertraline 50 milliGRAM(s) Oral daily  sodium bicarbonate 650 milliGRAM(s) Oral every 8 hours    PRN Inpatient Medications  aluminum hydroxide/magnesium hydroxide/simethicone Suspension 30 milliLiter(s) Oral every 6 hours PRN  benzonatate 200 milliGRAM(s) Oral three times a day PRN  nitroglycerin     SubLingual 0.4 milliGRAM(s) SubLingual every 5 minutes PRN  oxycodone    5 mG/acetaminophen 325 mG 1 Tablet(s) Oral every 6 hours PRN    VITALS/PHYSICAL EXAM  --------------------------------------------------------------------------------  T(C): 35.8 (02-17-20 @ 04:54), Max: 36.7 (02-16-20 @ 19:52)  HR: 61 (02-17-20 @ 04:54) (60 - 61)  BP: 150/75 (02-17-20 @ 04:54) (127/63 - 167/82)  RR: 18 (02-17-20 @ 04:54) (18 - 18)  SpO2: 99% (02-17-20 @ 01:08) (96% - 99%)  Wt(kg): --        02-16-20 @ 07:01  -  02-17-20 @ 07:00  --------------------------------------------------------  IN: 0 mL / OUT: 550 mL / NET: -550 mL      Physical Exam:  	Gen: NAD  	Pulm: decrease BS  B/L  	CV: S1S2; no rub  	Abd:  soft, nontender/nondistended  	LE: no edema      LABS/STUDIES  --------------------------------------------------------------------------------              9.2    15.95 >-----------<  374      [02-16-20 @ 06:05]              28.3     132  |  97  |  79  ----------------------------<  105      [02-16-20 @ 06:05]  4.5   |  23  |  2.7        Ca     8.6     [02-16-20 @ 06:05]      Mg     2.3     [02-16-20 @ 06:05]    TPro  6.0  /  Alb  3.0  /  TBili  <0.2  /  DBili  x   /  AST  46  /  ALT  28  /  AlkPhos  85  [02-16-20 @ 06:05]      Uric acid 11.0      [02-15-20 @ 10:40]    Creatinine Trend:  SCr 2.7 [02-16 @ 06:05]  SCr 2.8 [02-15 @ 08:19]  SCr 2.8 [02-14 @ 07:16]  SCr 3.2 [02-13 @ 20:49]  SCr 2.3 [02-11 @ 06:27]    Urinalysis - [02-15-20 @ 18:20]      Color Light Yellow / Appearance Clear / SG 1.012 / pH 5.5      Gluc Negative / Ketone Negative  / Bili Negative / Urobili <2 mg/dL       Blood Negative / Protein 30 mg/dL / Leuk Est Large / Nitrite Negative      RBC 6 / WBC 26 / Hyaline 1 / Gran  / Sq Epi  / Non Sq Epi 1 / Bacteria Negative    Urine Creatinine 76      [02-15-20 @ 18:20]  Urine Protein 32      [02-15-20 @ 18:20]  Urine Sodium <20.0      [02-15-20 @ 18:20]  Urine Urea Nitrogen 507      [02-15-20 @ 18:20]  Urine Osmolality 301      [02-15-20 @ 18:20]        Rheumatoid Factor 19      [02-15-20 @ 10:40]

## 2020-02-17 NOTE — PROGRESS NOTE ADULT - ASSESSMENT
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 Mckinney : non cardiac - OP follow up  - c/w Aspirin 81 mg daily, continue Lipitor and beta blockers        # Right wrist pain with inflammatory findings,likely rheumatoid arthritis flare   - WBC Count: 13<<19.03 <<11.00 <<12.34    - continue Prednisone 50 mg daily  - wrist x ray : mod degenerative changes, chondrocalcinosis,  no fractures  - ESR: 126, CRP : 24.78, RF 19 , Anti CCP pending  - Pain control with Percocet. Avoid NSAIDs given age, OMERO and cardiac history  - rheum consulted - pending    # right knee swelling:  - Xray - unremarkable  - uric acid = 11   - pt has hx of gout    # OMERO on CKD 4  - Creatinine Trend: 2.6<-2.8<--, 3.2<--, 2.3<--, 2.3<--, 2.4<--, 2.3<--baseline of 2.3   - Will hold Lasix for now as clinically not in volume overload   - US of the kidneys and bladder: no hydro, small kidneys  - Monitor urine output , please document (RN notified)  - Nephrology consult : MEt acidosis - start NA bicarb 650 mg po tid, started phoslo  - Calcitriol on hold given normal calcium level. 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 Will hold off on starting ACEi given OMERO, previous cardio notes also mentioning that patient is very sensitive to ACEi     # History of provoked DVT and PE (2019)  - Continue Eliquis 2.5 mg BID   - recent venous doppler of the lower extremities negative for DVT (02/07/2020)     # HTN   -  Continue Toprol 25 mg daily     # LUTS with leucocytosis UTI unlikely  - UA negative    # Miscellaneous   ·	DVT prophylaxis :  Eliquis   ·	GI prophylaxis : Protonix , maalox  ·	Activity : Ambulate with assistance , PT/Rehab  ·	Diet : DASH TLC renal diet   ·	Full code   Pending: PT/ Rehab, RHeum consulted,

## 2020-02-18 ENCOUNTER — TRANSCRIPTION ENCOUNTER (OUTPATIENT)
Age: 85
End: 2020-02-18

## 2020-02-18 DIAGNOSIS — M06.9 RHEUMATOID ARTHRITIS, UNSPECIFIED: ICD-10-CM

## 2020-02-18 DIAGNOSIS — M11.20 OTHER CHONDROCALCINOSIS, UNSPECIFIED SITE: ICD-10-CM

## 2020-02-18 DIAGNOSIS — E79.0 HYPERURICEMIA WITHOUT SIGNS OF INFLAMMATORY ARTHRITIS AND TOPHACEOUS DISEASE: ICD-10-CM

## 2020-02-18 DIAGNOSIS — M25.531 PAIN IN RIGHT WRIST: ICD-10-CM

## 2020-02-18 DIAGNOSIS — R70.0 ELEVATED ERYTHROCYTE SEDIMENTATION RATE: ICD-10-CM

## 2020-02-18 LAB
ANION GAP SERPL CALC-SCNC: 11 MMOL/L — SIGNIFICANT CHANGE UP (ref 7–14)
APPEARANCE UR: CLEAR — SIGNIFICANT CHANGE UP
BACTERIA # UR AUTO: NEGATIVE — SIGNIFICANT CHANGE UP
BASOPHILS # BLD AUTO: 0.04 K/UL — SIGNIFICANT CHANGE UP (ref 0–0.2)
BASOPHILS NFR BLD AUTO: 0.4 % — SIGNIFICANT CHANGE UP (ref 0–1)
BILIRUB UR-MCNC: NEGATIVE — SIGNIFICANT CHANGE UP
BUN SERPL-MCNC: 71 MG/DL — CRITICAL HIGH (ref 10–20)
CALCIUM SERPL-MCNC: 8.3 MG/DL — LOW (ref 8.5–10.1)
CHLORIDE SERPL-SCNC: 103 MMOL/L — SIGNIFICANT CHANGE UP (ref 98–110)
CO2 SERPL-SCNC: 25 MMOL/L — SIGNIFICANT CHANGE UP (ref 17–32)
COLOR SPEC: SIGNIFICANT CHANGE UP
CREAT SERPL-MCNC: 2.3 MG/DL — HIGH (ref 0.7–1.5)
DIFF PNL FLD: NEGATIVE — SIGNIFICANT CHANGE UP
EOSINOPHIL # BLD AUTO: 0.17 K/UL — SIGNIFICANT CHANGE UP (ref 0–0.7)
EOSINOPHIL NFR BLD AUTO: 1.8 % — SIGNIFICANT CHANGE UP (ref 0–8)
EPI CELLS # UR: 2 /HPF — SIGNIFICANT CHANGE UP (ref 0–5)
GLUCOSE BLDC GLUCOMTR-MCNC: 100 MG/DL — HIGH (ref 70–99)
GLUCOSE BLDC GLUCOMTR-MCNC: 101 MG/DL — HIGH (ref 70–99)
GLUCOSE BLDC GLUCOMTR-MCNC: 128 MG/DL — HIGH (ref 70–99)
GLUCOSE BLDC GLUCOMTR-MCNC: 92 MG/DL — SIGNIFICANT CHANGE UP (ref 70–99)
GLUCOSE SERPL-MCNC: 86 MG/DL — SIGNIFICANT CHANGE UP (ref 70–99)
GLUCOSE UR QL: NEGATIVE — SIGNIFICANT CHANGE UP
HCT VFR BLD CALC: 30.7 % — LOW (ref 37–47)
HGB BLD-MCNC: 9.9 G/DL — LOW (ref 12–16)
HYALINE CASTS # UR AUTO: 2 /LPF — SIGNIFICANT CHANGE UP (ref 0–7)
IMM GRANULOCYTES NFR BLD AUTO: 2.4 % — HIGH (ref 0.1–0.3)
KETONES UR-MCNC: NEGATIVE — SIGNIFICANT CHANGE UP
LEUKOCYTE ESTERASE UR-ACNC: ABNORMAL
LYMPHOCYTES # BLD AUTO: 2.63 K/UL — SIGNIFICANT CHANGE UP (ref 1.2–3.4)
LYMPHOCYTES # BLD AUTO: 27.4 % — SIGNIFICANT CHANGE UP (ref 20.5–51.1)
MAGNESIUM SERPL-MCNC: 2.5 MG/DL — HIGH (ref 1.8–2.4)
MCHC RBC-ENTMCNC: 29.8 PG — SIGNIFICANT CHANGE UP (ref 27–31)
MCHC RBC-ENTMCNC: 32.2 G/DL — SIGNIFICANT CHANGE UP (ref 32–37)
MCV RBC AUTO: 92.5 FL — SIGNIFICANT CHANGE UP (ref 81–99)
MONOCYTES # BLD AUTO: 1.07 K/UL — HIGH (ref 0.1–0.6)
MONOCYTES NFR BLD AUTO: 11.2 % — HIGH (ref 1.7–9.3)
NEUTROPHILS # BLD AUTO: 5.45 K/UL — SIGNIFICANT CHANGE UP (ref 1.4–6.5)
NEUTROPHILS NFR BLD AUTO: 56.8 % — SIGNIFICANT CHANGE UP (ref 42.2–75.2)
NITRITE UR-MCNC: NEGATIVE — SIGNIFICANT CHANGE UP
NRBC # BLD: 0 /100 WBCS — SIGNIFICANT CHANGE UP (ref 0–0)
PH UR: 6.5 — SIGNIFICANT CHANGE UP (ref 5–8)
PLATELET # BLD AUTO: 413 K/UL — HIGH (ref 130–400)
POTASSIUM SERPL-MCNC: 5.6 MMOL/L — HIGH (ref 3.5–5)
POTASSIUM SERPL-SCNC: 5.6 MMOL/L — HIGH (ref 3.5–5)
PROT UR-MCNC: SIGNIFICANT CHANGE UP
RBC # BLD: 3.32 M/UL — LOW (ref 4.2–5.4)
RBC # FLD: 12.5 % — SIGNIFICANT CHANGE UP (ref 11.5–14.5)
RBC CASTS # UR COMP ASSIST: 12 /HPF — HIGH (ref 0–4)
SODIUM SERPL-SCNC: 139 MMOL/L — SIGNIFICANT CHANGE UP (ref 135–146)
SP GR SPEC: 1.01 — SIGNIFICANT CHANGE UP (ref 1.01–1.02)
UROBILINOGEN FLD QL: SIGNIFICANT CHANGE UP
WBC # BLD: 9.59 K/UL — SIGNIFICANT CHANGE UP (ref 4.8–10.8)
WBC # FLD AUTO: 9.59 K/UL — SIGNIFICANT CHANGE UP (ref 4.8–10.8)
WBC UR QL: 10 /HPF — HIGH (ref 0–5)

## 2020-02-18 PROCEDURE — 99222 1ST HOSP IP/OBS MODERATE 55: CPT

## 2020-02-18 PROCEDURE — 74018 RADEX ABDOMEN 1 VIEW: CPT | Mod: 26

## 2020-02-18 RX ORDER — LACTULOSE 10 G/15ML
10 SOLUTION ORAL
Refills: 0 | Status: DISCONTINUED | OUTPATIENT
Start: 2020-02-18 | End: 2020-02-19

## 2020-02-18 RX ORDER — SODIUM POLYSTYRENE SULFONATE 4.1 MEQ/G
15 POWDER, FOR SUSPENSION ORAL ONCE
Refills: 0 | Status: COMPLETED | OUTPATIENT
Start: 2020-02-18 | End: 2020-02-18

## 2020-02-18 RX ADMIN — Medication 1334 MILLIGRAM(S): at 17:28

## 2020-02-18 RX ADMIN — SERTRALINE 50 MILLIGRAM(S): 25 TABLET, FILM COATED ORAL at 11:38

## 2020-02-18 RX ADMIN — SODIUM POLYSTYRENE SULFONATE 15 GRAM(S): 4.1 POWDER, FOR SUSPENSION ORAL at 11:38

## 2020-02-18 RX ADMIN — PANTOPRAZOLE SODIUM 40 MILLIGRAM(S): 20 TABLET, DELAYED RELEASE ORAL at 05:25

## 2020-02-18 RX ADMIN — Medication 1334 MILLIGRAM(S): at 11:38

## 2020-02-18 RX ADMIN — Medication 81 MILLIGRAM(S): at 11:38

## 2020-02-18 RX ADMIN — ATORVASTATIN CALCIUM 80 MILLIGRAM(S): 80 TABLET, FILM COATED ORAL at 21:30

## 2020-02-18 RX ADMIN — Medication 50 MILLIGRAM(S): at 05:25

## 2020-02-18 RX ADMIN — Medication 30 MILLILITER(S): at 08:29

## 2020-02-18 RX ADMIN — Medication 1 DROP(S): at 17:28

## 2020-02-18 RX ADMIN — APIXABAN 2.5 MILLIGRAM(S): 2.5 TABLET, FILM COATED ORAL at 17:28

## 2020-02-18 RX ADMIN — Medication 25 MILLIGRAM(S): at 05:25

## 2020-02-18 RX ADMIN — APIXABAN 2.5 MILLIGRAM(S): 2.5 TABLET, FILM COATED ORAL at 05:25

## 2020-02-18 RX ADMIN — LACTULOSE 10 GRAM(S): 10 SOLUTION ORAL at 13:37

## 2020-02-18 RX ADMIN — LACTULOSE 10 GRAM(S): 10 SOLUTION ORAL at 17:27

## 2020-02-18 RX ADMIN — Medication 1 DROP(S): at 05:25

## 2020-02-18 NOTE — CONSULT NOTE ADULT - ATTENDING COMMENTS
Seen / examined yesterday evening on rounds.  Above reviewed.    AVB s/p PPM.  Chronic Diastolic CHF.    Notable comorbidities: CKD, RA    Admitted with apparent RA flair.  Musculoskeletal CP in association with polyarthritis, low-grade fever / leukocytosis, and markedly elevated ESR.  Hemodynamics stable.    EKG: NSR, V-PCK  Mild / stable troponin elevation consistent with CKD / systemic stress.  No clear MI.    RECOMMEND:  - Steroids per primary team / rheumatology.  - Monitor volume status (likely deplete / OMERO).  Resume Lasix when improved.  - ECHO to assess for effusion.
83 yo F w/ hx of HTN, CKD IV, DVT/PE on Eliquis, HFpEF, AVB s/p PPM, clinically dx gout, rheumatoid arthritis not on DMARDs admitted with reproducible CW pain and acute right wrist pain. Minimal initial leukocytosis WBC elevation, , CRP 24.78, uric acid 11, RF 19, R wrist XR with chronic/degenerative change and small erosions with chondrocalcinosis suggestive of CPPD arthropathy. Tx with clindamycin x 2 days, prednisone 50mg x6 days. Rheumatology consulted due to concern for inflammatory arthritis of the R wrist ? RA flare vs crystalline arthropathy flare. Higher suspicion for crystalline arthropathy flare > RA flare given monoarticular nature of flare s/p URI in the setting of radiographic chondrocalcinosis and hyperuricemia, however either would be possible. No evidence of active inflammatory arthritis on exam today s/p 6 days high dose steroids. Would pursue quick taper decreasing dose daily by 10 mg increments then stop. Noted markedly elevated ESR>CRP - no current findings suggestive of infectious process, pt UTD with age appropriate malignancy screenings, no GCA symptoms. Should f/u with outpatient rheumatologist (had lapsed in care for > 1 year). Not currently on DMARD therapy for hx RA -  reports difficulty tolerating MTX in the past. Encouraged f/u for both hx RA and clinically dx crystalline arthropathy. Will sign off.

## 2020-02-18 NOTE — CONSULT NOTE ADULT - ASSESSMENT
IMPRESSION: Rehab of   debility, RA, pseudogout-of right wrist and knee-improving on prednisone, gout, CHF    PRECAUTIONS: [  ] Cardiac  [  ] Respiratory  [  ] Seizures [  ] Contact Isolation  [  ] Droplet Isolation  [  ] Other    Weight Bearing Status:     RECOMMENDATION:    Out of Bed to Chair     DVT/Decubiti Prophylaxis    REHAB PLAN:     [ x  ] Bedside P/T 3-5 times a week   [   ]   Bedside O/T  2-3 times a week             [   ] No Rehab Therapy Indicated                   [   ]  Speech Therapy   Conditioning/ROM                                    ADL  Bed Mobility                                               Conditioning/ROM  Transfers                                                     Bed Mobility  Sitting /Standing Balance                         Transfers                                        Gait Training                                               Sitting/Standing Balance  Stair Training [   ]Applicable                    Home equipment Eval                                                                        Splinting  [   ] Only      GOALS:   ADL   [ x  ]   Independent                    Transfers  [x   ] Independent                          Ambulation  [ x  ] Independent     x] With device                            [   ]  CG                                                         [   ]  CG                                                                  [   ] CG                            [    ] Min A                                                   [   ] Min A                                                              [   ] Min  A          DISCHARGE PLAN:   [   ]  Good candidate for Intensive Rehabilitation/Hospital based-[facility]                                             Will tolerate 3hrs Intensive Rehab Daily                                       [ xx   ]  Short Term Rehab in Skilled Nursing Facility                                       [    ]  Home with Outpatient or  services                                         [    ]  Possible Candidate for Intensive Hospital based Rehab

## 2020-02-18 NOTE — DISCHARGE NOTE PROVIDER - HOSPITAL COURSE
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 Admitted for evaluation of chest pain. Evaluated by Dr. Diaz Cardiologist and pain was descibed Atypical left sided chest pain likely GERD or musculoskeletal     - Patient has non-ischemic cardiomyopathy, multiple cath in the past revealing no CAD and a stress test done one year ago was negative per the patient     - Echocardiography was obtained to evaluate pericardial effusion because of pericarditis: Normal systolic function, EF 53%, Moderate MR, TR, Trivial pericardial effusion    - Cardiology consult Dr. Mckinney : non cardiac - OP follow up    - c/w Aspirin 81 mg daily, continue Lipitor and beta blockers              For Right wrist pain with inflammatory findings,likely rheumatoid arthritis flare     - WBC Count: 13<<19.03 <<11.00 <<12.34     - continue Prednisone 50 mg daily    - wrist x ray : mod degenerative changes, chondrocalcinosis,  no fractures    - ESR: 126, CRP : 24.78, RF 19     - Pain control with Percocet. Avoid NSAIDs given age, OMERO and cardiac history    - rheum consulted - pending        # right knee swelling:    - Xray - unremarkable    - uric acid    - No acute flare         # OMERO on CKD 4    - Creatinine Trend: 2.6<-2.8<--, 3.2<--, 2.3<--, 2.3<--, 2.4<--, 2.3<--baseline of 2.3     - Will hold Lasix for now as clinically not in volume overload     - US of the kidneys and bladder: no hydro, small kidneys    - Monitor urine output , please document (RN notified)    - Nephrology consult : MEt acidosis - treated with NA bicarb 650 mg po tid, now discontinuing it.    - Calcitriol on hold given normal calcium level. F/U with nephrologist Dr. 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 Admitted for evaluation of chest pain. Evaluated by Dr. Diaz Cardiologist and pain was descibed Atypical left sided chest pain likely GERD or musculoskeletal     - Patient has non-ischemic cardiomyopathy, multiple cath in the past revealing no CAD and a stress test done one year ago was negative per the patient     - Echocardiography was obtained to evaluate pericardial effusion because of pericarditis: Normal systolic function, EF 53%, Moderate MR, TR, Trivial pericardial effusion    - Cardiology consult Dr. Mckinney : non cardiac - OP follow up    - c/w Aspirin 81 mg daily, continue Lipitor and beta blockers         Dizziness seems to be post viral vertibular neuritis    complete the course of steroids and use meclizine PRN fro dizziness             For Right wrist pain with inflammatory findings,likely rheumatoid arthritis flare     - continue Prednisone taper as prescribed    - wrist x ray : mod degenerative changes, chondrocalcinosis,  no fractures    - ESR: 126, CRP : 24.78, RF 19     - Pain control with Percocet. Avoid NSAIDs given age, OMERO and cardiac history    - rheum consulted -     Higher suspicion for crystalline arthropathy flare > RA flare given monoarticular nature of flare s/p URI      No evidence of active inflammatory arthritis on exam.      Would pursue quick taper decreasing dose daily by 10 mg increments then stop. Noted markedly elevated ESR>CRP      Should f/u with outpatient rheumatologist (had lapsed in care for > 1 year). Not currently on DMARD therapy for hx RA -  reports difficulty tolerating MTX in the past. Encouraged f/u for both hx RA and clinically dx crystalline arthropathy.        # right knee swelling:    - Xray - unremarkable    - uric acid    - No acute flare         # OMERO on CKD 4    - Creatinine Trend: 2.6<-2.8<--, 3.2<--, 2.3<--, 2.3<--, 2.4<--, 2.3<--baseline of 2.3     - Will hold Lasix for now as clinically not in volume overload     - US of the kidneys and bladder: no hydro, small kidneys    - Monitor urine output , please document (RN notified)    - Nephrology consult : MEt acidosis - treated with NA bicarb 650 mg po tid, now discontinuing it.    - Calcitriol on hold given normal calcium level. F/U with nephrologist Dr. 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 Admitted for evaluation of chest pain. Evaluated by Dr. Diaz Cardiologist and pain was descibed Atypical left sided chest pain likely GERD or musculoskeletal     - Patient has non-ischemic cardiomyopathy, multiple cath in the past revealing no CAD and a stress test done one year ago was negative per the patient     - Echocardiography was obtained to evaluate pericardial effusion because of pericarditis: Normal systolic function, EF 53%, Moderate MR, TR, Trivial pericardial effusion    - Cardiology consult Dr. Mckinney : non cardiac - OP follow up    - c/w Aspirin 81 mg daily, continue Lipitor and beta blockers         Dizziness seems to be post viral vertibular neuritis    complete the course of steroids and use meclizine PRN fro dizziness             For Right wrist pain with inflammatory findings,likely rheumatoid arthritis flare     - continue Prednisone taper as prescribed    - wrist x ray : mod degenerative changes, chondrocalcinosis,  no fractures    - ESR: 126, CRP : 24.78, RF 19     - Pain control with Percocet. Avoid NSAIDs given age, OMERO and cardiac history    - rheum consulted -     Higher suspicion for crystalline arthropathy flare > RA flare given monoarticular nature of flare s/p URI      No evidence of active inflammatory arthritis on exam.      Would pursue quick taper decreasing dose daily by 10 mg increments then stop. Noted markedly elevated ESR>CRP      Should f/u with outpatient rheumatologist (had lapsed in care for > 1 year). Not currently on DMARD therapy for hx RA -  reports difficulty tolerating MTX in the past. Encouraged f/u for both hx RA and clinically dx crystalline arthropathy.        # right knee swelling:    - Xray - unremarkable    - uric acid    - No acute flare         # OMERO on CKD 4 - resolved    - Creatinine Trend: 2.3<-2.8<--, 3.2<--, 2.3<--, 2.3<--, 2.4<--, 2.3<--baseline of 2.3     - Will hold Lasix for now as clinically not in volume overload     - US of the kidneys and bladder: no hydro, small kidneys    - Nephrology consult : MEt acidosis - treated with NA bicarb 650 mg po tid, now discontinuing it.    - Calcitriol on hold given normal calcium level. F/U with nephrologist Dr. Urrutia for restart of lasix        # History of provoked DVT and PE (2019)    - Continue Eliquis 2.5 mg BID

## 2020-02-18 NOTE — DISCHARGE NOTE PROVIDER - CARE PROVIDER_API CALL
Wade Celeste)  Internal Medicine  [address] [place] [address]  Phone: [phone]  Fax: [phone]  Follow Up Time: 1 week    Sukhjinder Urrutia)  Nephrology  [address] [place] [address]  Phone: [phone]  Fax: [phone]  Follow Up Time: 1-3 days    Sterling Mckinney)  Cardiovascular Disease; Internal Medicine  [address] [place] [id number]  Phone: [phone]  Fax: [phone]  Follow Up Time: 1 week    Hayley Marin (DO)  Internal Medicine  [address] [place] [id number]  Phone: [phone]  Fax: [phone]  Follow Up Time: 1 week

## 2020-02-18 NOTE — DISCHARGE NOTE PROVIDER - CARE PROVIDERS DIRECT ADDRESSES
,DirectAddress_Unknown,DirectAddress_Unknown,[email or web address]

## 2020-02-18 NOTE — CONSULT NOTE ADULT - REASON FOR ADMISSION
Chest pain, right wrist pain

## 2020-02-18 NOTE — CONSULT NOTE ADULT - ASSESSMENT
83 yo F w/ hx of HTN, CKD IV, DVT/PE on Eliquis, HFpEF, AVB s/p PPM, gout not on home meds, rheumatoid arthritis not on home meds presenting for chest pain and right wrist pain. Chest pain is recurrent in the past, atypical in nature, multiple negative cath and stress test, reproducible on palpation. She also complaints of right wrist pain, edema and erythema as well as decreased range of motion, accompanied by temp of 100. WBC elevation (12->19->10), afebrile during hospitalization, knee XR shows b/l joint space narrowing and suprapatellar joint effusion, right wrist XR shows chronic/degenerative change and small erosions with chondrocalcinosis suggestive of CPPD arthropathy, , CRP 24.78, uric acid 11, RF 19,  Clindamycin x2 days, prednisone 50mg x6 days. Patient was also in OMERO d/t dehydration from diuretic use, creatinine baseline today.    #Right knee pain  #Right wrist pain  #HO RA  #HO gout 85 yo F w/ hx of HTN, CKD IV, DVT/PE on Eliquis, HFpEF, AVB s/p PPM, gout not on home meds, rheumatoid arthritis not on home meds presenting for chest pain and right wrist pain. Chest pain is recurrent in the past, atypical in nature, multiple negative cath and stress test, reproducible on palpation. She also complaints of right wrist pain, edema and erythema as well as decreased range of motion, accompanied by temp of 100. WBC elevation (12->19->10), afebrile during hospitalization, knee XR shows b/l joint space narrowing and suprapatellar joint effusion, right wrist XR shows chronic/degenerative change and small erosions with chondrocalcinosis suggestive of CPPD arthropathy, , CRP 24.78, uric acid 11, RF 19,  Clindamycin x2 days, prednisone 50mg x6 days. Patient was also in OMERO d/t dehydration from diuretic use, creatinine baseline today.    #Monoarticular arthropathy in right wrist, improving  #HO RA  #HO gout  #HO b/l knee osteoarthritis 85 yo F w/ hx of HTN, CKD IV, DVT/PE on Eliquis, HFpEF, AVB s/p PPM, gout not on home meds, rheumatoid arthritis not on home meds presenting for chest pain and right wrist pain. Chest pain is recurrent in the past, atypical in nature, multiple negative cath and stress test, reproducible on palpation. She also complaints of right wrist pain, edema and erythema as well as decreased range of motion, accompanied by temp of 100. WBC elevation (12->19->10), afebrile during hospitalization, knee XR shows b/l joint space narrowing and suprapatellar joint effusion, right wrist XR shows chronic/degenerative change and small erosions with chondrocalcinosis suggestive of CPPD arthropathy, , CRP 24.78, uric acid 11, RF 19,  Clindamycin x2 days, prednisone 50mg x6 days. Patient was also in OMERO d/t dehydration from diuretic use, creatinine baseline today.    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 Patient last followed up with her rheumatologist over 1 year ago, she is agreeable to  follow up with her rheumatologist outpatient and will make appoint after discharge. 85 yo F w/ hx of HTN, CKD IV, DVT/PE on Eliquis, HFpEF, AVB s/p PPM, gout not on home meds, rheumatoid arthritis not on home meds presenting for chest pain and right wrist pain. Chest pain is recurrent in the past, atypical in nature, multiple negative cath and stress test, reproducible on palpation. She also complaints of right wrist pain, edema and erythema as well as decreased range of motion, accompanied by temp of 100. WBC elevation (12->19->10), afebrile during hospitalization, knee XR shows b/l joint space narrowing and suprapatellar joint effusion, right wrist XR shows chronic/degenerative change and small erosions with chondrocalcinosis suggestive of CPPD arthropathy, , CRP 24.78, uric acid 11, RF 19,  Clindamycin x2 days, prednisone 50mg x6 days. Patient was also in OMERO d/t dehydration from diuretic use, creatinine baseline today.    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 She has elevated uric acid level with history of gout, also shows signs of pseudogout on imaging study, however it is difficult to assess the etiology since she is s/p 6 days of high dose steroid at this point. Today patient's bilateral knee osteoarthritis is at baseline, bilateral 2nd-5th PIP and MCP demonstrates chronic rheumatoid arthritis changes, she also reports improvement to her right wrist pain, can taper steroid with 40mg x1 day, 30mg x1 day, 20mg x1 day, 10mg x1 day, then stop. Patient last followed up with her rheumatologist over 1 year ago, she is agreeable to  follow up with her rheumatologist outpatient and will make appoint after discharge.

## 2020-02-18 NOTE — DISCHARGE NOTE PROVIDER - NSDCMRMEDTOKEN_GEN_ALL_CORE_FT
apixaban 2.5 mg oral tablet: 1 tab(s) orally 2 times a day  atorvastatin 80 mg oral tablet: 1 tab(s) orally once a day  calcitriol 0.25 mcg oral capsule: 1 cap(s) orally once a day  ciprofloxacin 0.2% otic solution: 1 each in the left ear 2 times a day   furosemide 40 mg oral tablet: 1 tab(s) orally once a day  metoprolol succinate 25 mg oral tablet, extended release: 1 tab(s) orally once a day  pantoprazole 40 mg oral delayed release tablet: 1 tab(s) orally once a day (before a meal)  sertraline 50 mg oral tablet: 1 tab(s) orally once a day apixaban 2.5 mg oral tablet: 1 tab(s) orally 2 times a day  atorvastatin 80 mg oral tablet: 1 tab(s) orally once a day  benzonatate 100 mg oral capsule: 2 cap(s) orally 3 times a day, As needed, Cough  calcitriol 0.25 mcg oral capsule: 1 cap(s) orally once a day  calcium acetate 667 mg oral tablet: 2 tab(s) orally 3 times a day  meclizine 25 mg oral tablet: 1 tab(s) orally every 8 hours, As needed, Vertigo  metoprolol succinate 25 mg oral tablet, extended release: 1 tab(s) orally once a day  pantoprazole 40 mg oral delayed release tablet: 1 tab(s) orally once a day (before a meal)  predniSONE 10 mg oral tablet: 3 tab(s) orally once a day  predniSONE 10 mg oral tablet: 2 tab(s) orally once a day   predniSONE 10 mg oral tablet: 1 tab(s) orally once a day   sertraline 50 mg oral tablet: 1 tab(s) orally once a day apixaban 2.5 mg oral tablet: 1 tab(s) orally 2 times a day  aspirin 81 mg oral tablet, chewable: 1 tab(s) orally once a day  atorvastatin 80 mg oral tablet: 1 tab(s) orally once a day  benzonatate 100 mg oral capsule: 2 cap(s) orally 3 times a day, As needed, Cough  calcium acetate 667 mg oral tablet: 2 tab(s) orally 3 times a day  meclizine 25 mg oral tablet: 1 tab(s) orally every 8 hours, As needed, Vertigo  metoprolol succinate 25 mg oral tablet, extended release: 1 tab(s) orally once a day  pantoprazole 40 mg oral delayed release tablet: 1 tab(s) orally once a day (before a meal)  sertraline 50 mg oral tablet: 1 tab(s) orally once a day

## 2020-02-18 NOTE — PROGRESS NOTE ADULT - ASSESSMENT
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 Mckinney : non cardiac - OP follow up  - c/w Aspirin 81 mg daily, continue Lipitor and beta blockers        # Right wrist pain with inflammatory findings,likely rheumatoid arthritis flare   - WBC Count: 13<<19.03 <<11.00 <<12.34    - continue Prednisone 50 mg daily  - wrist x ray : mod degenerative changes, chondrocalcinosis,  no fractures  - ESR: 126, CRP : 24.78, RF 19 , Anti CCP pending  - Pain control with Percocet. Avoid NSAIDs given age, OMERO and cardiac history  - rheum consulted - pending    # right knee swelling:  - Xray - unremarkable  - uric acid = 11   - pt has hx of gout    # OMERO on CKD 4  - Creatinine Trend: 2.6<-2.8<--, 3.2<--, 2.3<--, 2.3<--, 2.4<--, 2.3<--baseline of 2.3   - Will hold Lasix for now as clinically not in volume overload   - US of the kidneys and bladder: no hydro, small kidneys  - Monitor urine output , please document (RN notified)  - Nephrology consult : MEt acidosis - start NA bicarb 650 mg po tid, started phoslo  - Calcitriol on hold given normal calcium level. 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 Will hold off on starting ACEi given OMERO, previous cardio notes also mentioning that patient is very sensitive to ACEi     # History of provoked DVT and PE (2019)  - Continue Eliquis 2.5 mg BID   - recent venous doppler of the lower extremities negative for DVT (02/07/2020)     # HTN   -  Continue Toprol 25 mg daily     # LUTS with leucocytosis UTI unlikely  - UA negative    # Miscellaneous   ·	DVT prophylaxis :  Eliquis   ·	GI prophylaxis : Protonix , maalox  ·	Activity : Ambulate with assistance , PT/Rehab  ·	Diet : DASH TLC renal diet   ·	Full code   Pending: PT/ Rehab, RHeum consulted, 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 Mckinney : non cardiac - OP follow up  - c/w Aspirin 81 mg daily, continue Lipitor and beta blockers        # Right wrist pain with inflammatory findings,likely rheumatoid arthritis flare   - WBC Count: 10<< 13<<19.03 <<11.00 <<12.34    - wrist x ray : mod degenerative changes, chondrocalcinosis,  no fractures  - ESR: 126, CRP : 24.78, RF 19 ,  - Pain control with Percocet. Avoid NSAIDs given age, OMERO and cardiac history  - continue Prednisone 50 mg daily  - rheum consulted - pending    # right knee swelling:  - Xray - unremarkable  - uric acid = 11   - pt has hx of gout    # OMERO on CKD 4  - Creatinine Trend: 2.6<-2.8<--, 3.2<--, 2.3<--, 2.3<--, 2.4<--, 2.3<--baseline of 2.3   - Will hold Lasix for now as clinically not in volume overload   - US of the kidneys and bladder: no hydro, small kidneys  - Monitor urine output , please document (RN notified)  - Nephrology consult : MEt acidosis - treated with NA bicarb 650 mg po tid, now dced  - Calcitriol on hold given normal calcium level. 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 Will hold off on starting ACEi given OMERO, previous cardio notes also mentioning that patient is very sensitive to ACEi     # History of provoked DVT and PE (2019)  - Continue Eliquis 2.5 mg BID   - recent venous doppler of the lower extremities negative for DVT (02/07/2020)     # HTN   -  Continue Toprol 25 mg daily     # LUTS with leucocytosis UTI unlikely  - UA negative    # Miscellaneous   ·	DVT prophylaxis :  Eliquis   ·	GI prophylaxis : Protonix , maalox  ·	Activity : Ambulate with assistance , PT/Rehab  ·	Diet : DASH TLC renal diet   ·	Full code   Pending:, RHeum consulted,  Dispo: SNF

## 2020-02-18 NOTE — DISCHARGE NOTE PROVIDER - PROVIDER TOKENS
PROVIDER:[TOKEN:[[id number]],FOLLOWUP:[1 week]],PROVIDER:[TOKEN:[[id number]],FOLLOWUP:[1-3 days]],PROVIDER:[TOKEN:[[id number]],FOLLOWUP:[1 week]],PROVIDER:[TOKEN:[[id number]],FOLLOWUP:[1 week]]

## 2020-02-18 NOTE — PROGRESS NOTE ADULT - SUBJECTIVE AND OBJECTIVE BOX
Nephrology progress note    THIS IS AN INCOMPLETE NOTE . FULL NOTE TO FOLLOW SHORTLY    Patient is seen and examined, events over the last 24 h noted .    Allergies:  Keflex (Unknown)    Hospital Medications:   MEDICATIONS  (STANDING):  apixaban 2.5 milliGRAM(s) Oral every 12 hours  aspirin  chewable 81 milliGRAM(s) Oral daily  atorvastatin 80 milliGRAM(s) Oral at bedtime  calcium acetate 1334 milliGRAM(s) Oral three times a day with meals  ciprofloxacin  0.3% Otic Solution 1 Drop(s) Both Ears two times a day  metoprolol succinate ER 25 milliGRAM(s) Oral daily  pantoprazole    Tablet 40 milliGRAM(s) Oral before breakfast  predniSONE   Tablet 50 milliGRAM(s) Oral daily  sertraline 50 milliGRAM(s) Oral daily  sodium bicarbonate 650 milliGRAM(s) Oral every 8 hours  sodium polystyrene sulfonate Suspension 15 Gram(s) Oral once        VITALS:  T(F): 97.3 (20 @ 04:37), Max: 97.3 (20 @ 04:37)  HR: 60 (20 @ 04:37)  BP: 154/72 (20 @ 04:37)  RR: 18 (20 @ 04:37)  SpO2: --  Wt(kg): --     @ 07:01  -   @ 07:00  --------------------------------------------------------  IN: 0 mL / OUT: 550 mL / NET: -550 mL     @ 07:01  -   @ 07:00  --------------------------------------------------------  IN: 100 mL / OUT: 250 mL / NET: -150 mL          PHYSICAL EXAM:  Constitutional: NAD  HEENT: anicteric sclera, oropharynx clear, MMM  Neck: No JVD  Respiratory: CTAB, no wheezes, rales or rhonchi  Cardiovascular: S1, S2, RRR  Gastrointestinal: BS+, soft, NT/ND  Extremities: No cyanosis or clubbing. No peripheral edema  :  No carlos.   Skin: No rashes    LABS:      139  |  103  |  71<HH>  ----------------------------<  86  5.6<H>   |  25  |  2.3<H>    Creatinine Trend: 2.3<--, 2.6<--, 2.7<--, 2.8<--, 2.8<--, 3.2<--    Ca    8.3<L>      2020 07:03  Mg     2.5         TPro  6.2  /  Alb  3.0<L>  /  TBili  <0.2  /  DBili      /  AST  32  /  ALT  24  /  AlkPhos  88                            9.9    9.59  )-----------( 413      ( 2020 07:03 )             30.7       Urine Studies:  Urinalysis Basic - ( 15 Feb 2020 18:20 )    Color: Light Yellow / Appearance: Clear / S.012 / pH:   Gluc:  / Ketone: Negative  / Bili: Negative / Urobili: <2 mg/dL   Blood:  / Protein: 30 mg/dL / Nitrite: Negative   Leuk Esterase: Large / RBC: 6 /HPF / WBC 26 /HPF   Sq Epi:  / Non Sq Epi: 1 /HPF / Bacteria: Negative      Osmolality, Random Urine: 301 mos/kg (02-15 @ 18:20)  Sodium, Random Urine: <20.0 mmoL/L (02-15 @ 18:20)  Creatinine, Random Urine: 76 mg/dL (02-15 @ 18:20)  Protein/Creatinine Ratio Calculation: 0.4 Ratio (02-15 @ 18:20)    RADIOLOGY & ADDITIONAL STUDIES: Nephrology progress note  Patient is seen and examined, events over the last 24 h noted .  complained of abdominal pain   No fever no chills no diarrhea  no other complaints     Allergies:  Keflex (Unknown)    Hospital Medications:   MEDICATIONS  (STANDING):  apixaban 2.5 milliGRAM(s) Oral every 12 hours  aspirin  chewable 81 milliGRAM(s) Oral daily  atorvastatin 80 milliGRAM(s) Oral at bedtime  calcium acetate 1334 milliGRAM(s) Oral three times a day with meals  ciprofloxacin  0.3% Otic Solution 1 Drop(s) Both Ears two times a day  metoprolol succinate ER 25 milliGRAM(s) Oral daily  pantoprazole    Tablet 40 milliGRAM(s) Oral before breakfast  predniSONE   Tablet 50 milliGRAM(s) Oral daily  sertraline 50 milliGRAM(s) Oral daily  sodium bicarbonate 650 milliGRAM(s) Oral every 8 hours  sodium polystyrene sulfonate Suspension 15 Gram(s) Oral once        VITALS:  T(F): 97.3 (20 @ 04:37), Max: 97.3 (20 @ 04:37)  HR: 60 (20 @ 04:37)  BP: 154/72 (20 @ 04:37)  RR: 18 (20 @ 04:37)      02-16 @ 07:  -   @ 07:00  --------------------------------------------------------  IN: 0 mL / OUT: 550 mL / NET: -550 mL     @ 07:01  -   @ 07:00  --------------------------------------------------------  IN: 100 mL / OUT: 250 mL / NET: -150 mL          PHYSICAL EXAM:  Constitutional: NAD  HEENT: anicteric sclera, oropharynx clear, MMM  Neck: No JVD  Respiratory: CTAB, no wheezes, rales or rhonchi  Cardiovascular: S1, S2, RRR  Gastrointestinal: BS+, soft, NT/ND  Extremities: No cyanosis or clubbing. No peripheral edema  :  No carlos.   Skin: No rashes    LABS:      139  |  103  |  71<HH>  ----------------------------<  86  5.6<H>   |  25  |  2.3<H>    Potassium Trend: 5.6<--, 4.9<--, 4.5<--, 5.3<--, 5.1<--    Creatinine Trend: 2.3<--, 2.6<--, 2.7<--, 2.8<--, 2.8<--, 3.2<--    Ca    8.3<L>      2020 07:03  Mg     2.5         TPro  6.2  /  Alb  3.0<L>  /  TBili  <0.2  /  DBili      /  AST  32  /  ALT  24  /  AlkPhos  88                            9.9    9.59  )-----------( 413      ( 2020 07:03 )             30.7       Urine Studies:  Urinalysis Basic - ( 15 Feb 2020 18:20 )    Color: Light Yellow / Appearance: Clear / S.012 / pH:   Gluc:  / Ketone: Negative  / Bili: Negative / Urobili: <2 mg/dL   Blood:  / Protein: 30 mg/dL / Nitrite: Negative   Leuk Esterase: Large / RBC: 6 /HPF / WBC 26 /HPF   Sq Epi:  / Non Sq Epi: 1 /HPF / Bacteria: Negative      Osmolality, Random Urine: 301 mos/kg (02-15 @ 18:20)  Sodium, Random Urine: <20.0 mmoL/L (02-15 @ 18:20)  Creatinine, Random Urine: 76 mg/dL (02-15 @ 18:20)  Protein/Creatinine Ratio Calculation: 0.4 Ratio (02-15 @ 18:20)    RADIOLOGY & ADDITIONAL STUDIES:

## 2020-02-18 NOTE — PROGRESS NOTE ADULT - ASSESSMENT
Pt with CKD stage 4/5, HTN, RA, CHF, admitted with CP.    CKD - creat stable   - per notes  pt states she does not want HD if needed in the future, follows with Dr Urrutia  - ph 5.1 , on binders   -kidney  sono  - small kidneys no hydro  - keep holding lasix , seem euvolemic on exam   -non oliguric   MEt acidosis - continue NA bicarb 650 mg po  Anemia - iron stores, no need for ELIJAH   -2D Echo no pericardial effusion / diastolic dysfunction   - will follow Pt with CKD stage 4/5, HTN, RA, CHF, admitted with CP.    #CKD 4 - creat stable   - per notes  pt states she does not want HD if needed in the future, follows with Dr Urrutia  - ph 5.1 , on binders   -kidney  sono  - small kidneys no hydro  - keep holding lasix no IVF  , seem euvolemic on exam   -non oliguric   MEt acidosis - resolved DC sodium bicarb  # hyperkalemia noted would not use SPS for K of 5.6   Anemia - iron stores, no need for ELIJAH   -2D Echo no pericardial effusion / diastolic dysfunction   - will follow

## 2020-02-18 NOTE — DISCHARGE NOTE PROVIDER - NSDCCPCAREPLAN_GEN_ALL_CORE_FT
PRINCIPAL DISCHARGE DIAGNOSIS  Diagnosis: Chest pain  Assessment and Plan of Treatment: You were admitted for evaluation of chest pain. Evaluated by Dr. Diaz Cardiologist and pain was descibed Atypical left sided chest pain likely GERD or musculoskeletal   You have non-ischemic cardiomyopathy, multiple cath in the past revealing no CAD and a stress test done one year ago was negative per the patient   - Echocardiography was obtained to evaluate pericardial effusion because of pericarditis: Normal systolic function, EF 53%, Moderate MR, TR, Trivial pericardial effusion  - Cardiology consult Dr. Mckinney : non cardiac - OP follow up  - c/w Aspirin 81 mg daily, continue Lipitor and beta blockers      SECONDARY DISCHARGE DIAGNOSES  Diagnosis: OMERO (acute kidney injury)  Assessment and Plan of Treatment: Resolved. You have CKD. We are holding your lasix. Please follow up with your nephrologist for restarting of lasix and CKD management    Diagnosis: Wrist pain  Assessment and Plan of Treatment: For Right wrist pain with inflammatory findings, likely rheumatoid arthritis flare   - wrist x ray : mod degenerative changes, chondrocalcinosis,  no fractures  - ESR: 126, CRP : 24.78, RF 19   - Avoid NSAIDs given age, OMERO and cardiac history   No evidence of active inflammatory arthritis on exam.    Would pursue quick taper decreasing dose daily by 10 mg increments then stop.   Should f/u with outpatient rheumatologist (had lapsed in care for > 1 year). Not currently on DMARD therapy for hx RA. You reported difficulty tolerating MTX in the past. Please follow up for both hx RA and clinically dx crystalline arthropathy.

## 2020-02-18 NOTE — CONSULT NOTE ADULT - SUBJECTIVE AND OBJECTIVE BOX
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 The pain is currently rated as 4/10 by the patient. She has been having recurrent chest pain in the past and underwent multiple cath that were normal and states that she had a stress test done last year that was normal. She states that her current chest pain is different from the previous ones. She also complaints of right wrist pain, edema and erythema as well as decreased range of motion since yesterday, accompanied by low grade fever of 100.   The patient is complaining of recurrent dyspnea as well as cough productive of yellow sputum and also reports orthopnea and abdominal pain that is exacerbating by feeling of voiding that quickly resolves after she urinates   In the ED : Troponin T 0.03, ECG showing atrial paced rhythm. Mild leucocytosis of 12.34. Chest and right wrist x rays pending. BNP 5170. patient was admitted for workup of her chest pain and possible cellulitis vs rheumatoid arthritis flare involving the right wrist (14 Feb 2020 01:29)      PAST MEDICAL & SURGICAL HISTORY:  DVT, lower extremity  Rheumatoid arthritis  Diastolic heart failure  Diverticulitis: sigmoid  Gout  Hypertension  Pacemaker  Chronic kidney disease  History of hysterectomy  History of tonsillectomy  History of appendectomy  Artificial pacemaker      Hospital Course:  cardiology found reproducible chest wall pain. She has had improvement in right knee and wrist pain with prednisone.   TODAY'S SUBJECTIVE & REVIEW OF SYMPTOMS:     Constitutional WNL   Cardio WNL   Resp WNL   GI WNL  Heme WNL  Endo WNL  Skin WNL  MSK WNL  Neuro WNL  Cognitive WNL  Psych WNL      MEDICATIONS  (STANDING):  apixaban 2.5 milliGRAM(s) Oral every 12 hours  aspirin  chewable 81 milliGRAM(s) Oral daily  atorvastatin 80 milliGRAM(s) Oral at bedtime  calcium acetate 1334 milliGRAM(s) Oral three times a day with meals  ciprofloxacin  0.3% Otic Solution 1 Drop(s) Both Ears two times a day  lactulose Syrup 10 Gram(s) Oral every 2 hours  metoprolol succinate ER 25 milliGRAM(s) Oral daily  pantoprazole    Tablet 40 milliGRAM(s) Oral before breakfast  predniSONE   Tablet 50 milliGRAM(s) Oral daily  sertraline 50 milliGRAM(s) Oral daily    MEDICATIONS  (PRN):  acetaminophen   Tablet .. 650 milliGRAM(s) Oral every 6 hours PRN Moderate Pain (4 - 6)  aluminum hydroxide/magnesium hydroxide/simethicone Suspension 30 milliLiter(s) Oral every 6 hours PRN Dyspepsia  benzonatate 200 milliGRAM(s) Oral three times a day PRN Cough  nitroglycerin     SubLingual 0.4 milliGRAM(s) SubLingual every 5 minutes PRN Chest Pain  oxycodone    5 mG/acetaminophen 325 mG 1 Tablet(s) Oral every 6 hours PRN Severe Pain (7 - 10)      FAMILY HISTORY:  No pertinent family history in first degree relatives      Allergies    Keflex (Unknown)    Intolerances        SOCIAL HISTORY:    [  ] Etoh  [  ] Smoking  [  ] Substance abuse     Home Environment:  [  ] Home Alone  [  ] Lives with Family  [  ] Home Health Aid    Dwelling:  [  ] Apartment  [  ] Private House  [  ] Adult Home  [  ] Skilled Nursing Facility      [  ] Short Term  [  ] Long Term  [  ] Stairs       Elevator [  ]    FUNCTIONAL STATUS PTA: (Check all that apply)  Ambulation: [ x  ]Independent    [  ] Dependent     [  ] Non-Ambulatory  Assistive Device: [x  ] SA Cane-indoors  [  ]  Q Cane  [x  ] Walker -outdoors-rolattor [  ]  Wheelchair  ADL : [  ] Independent  [  ]  Dependent       Vital Signs Last 24 Hrs  T(C): 36.3 (18 Feb 2020 04:37), Max: 36.3 (18 Feb 2020 04:37)  T(F): 97.3 (18 Feb 2020 04:37), Max: 97.3 (18 Feb 2020 04:37)  HR: 60 (18 Feb 2020 04:37) (60 - 60)  BP: 154/72 (18 Feb 2020 04:37) (144/68 - 168/78)  BP(mean): --  RR: 18 (18 Feb 2020 04:37) (18 - 18)  SpO2: --      PHYSICAL EXAM: Alert & Oriented X3  GENERAL: NAD, well-groomed, well-developed  HEAD:  Atraumatic, Normocephalic  EYES: EOMI, PERRLA, conjunctiva and sclera clear  NECK: Supple, No JVD, Normal thyroid  CHEST/LUNG: Clear to percussion bilaterally; No rales, rhonchi, wheezing, or rubs  HEART: Regular rate and rhythm; No murmurs, rubs, or gallops  ABDOMEN: Soft, Nontender, Nondistended; Bowel sounds present  EXTREMITIES:  2+ Peripheral Pulses, No clubbing, cyanosis, or edema    NERVOUS SYSTEM:  Cranial Nerves 2-12 intact [  ] Abnormal  [  ]  ROM: WFL all extremities [  ]  Abnormal [  ]   MCP's and PIp's with def and wrist ulnar dev suggestive for RA  Motor Strength: WFL all extremities  [  ]  Abnormal [ x ]  5-/5 LE's  Sensation: intact to light touch [  ] Abnormal [  ]  Reflexes: Symmetric [  ]  Abnormal [  ]    FUNCTIONAL STATUS:  Bed Mobility: Independent [  ]  Supervision [  ]  Needs Assistance [  ]  N/A [  ]  Transfers: Independent [  ]  Supervision [  ]  Needs Assistance [  ]  N/A [  ]   Ambulation: Independent [  ]  Supervision [  ]  Needs Assistance [  ]  N/A [  ]  ADL: Independent [  ] Requires Assistance [  ] N/A [  ]      LABS:                        9.9    9.59  )-----------( 413      ( 18 Feb 2020 07:03 )             30.7     02-18    139  |  103  |  71<HH>  ----------------------------<  86  5.6<H>   |  25  |  2.3<H>    Ca    8.3<L>      18 Feb 2020 07:03  Mg     2.5     02-18    TPro  6.2  /  Alb  3.0<L>  /  TBili  <0.2  /  DBili  x   /  AST  32  /  ALT  24  /  AlkPhos  88  02-17          RADIOLOGY & ADDITIONAL STUDIES:    Assesment:

## 2020-02-18 NOTE — PROGRESS NOTE ADULT - SUBJECTIVE AND OBJECTIVE BOX
LENGTH OF HOSPITAL STAY: 4d    CHIEF COMPLAINT:   Patient is a 84y old  Female who presents with a chief complaint of Chest pain, right wrist pain (17 Feb 2020 11:55)      Overnight events:    No acute events overnight    ALLERGIES:  Keflex (Unknown)    MEDICATIONS:  STANDING MEDICATIONS  apixaban 2.5 milliGRAM(s) Oral every 12 hours  aspirin  chewable 81 milliGRAM(s) Oral daily  atorvastatin 80 milliGRAM(s) Oral at bedtime  calcium acetate 1334 milliGRAM(s) Oral three times a day with meals  ciprofloxacin  0.3% Otic Solution 1 Drop(s) Both Ears two times a day  metoprolol succinate ER 25 milliGRAM(s) Oral daily  pantoprazole    Tablet 40 milliGRAM(s) Oral before breakfast  predniSONE   Tablet 50 milliGRAM(s) Oral daily  sertraline 50 milliGRAM(s) Oral daily  sodium bicarbonate 650 milliGRAM(s) Oral every 8 hours    PRN MEDICATIONS  acetaminophen   Tablet .. 650 milliGRAM(s) Oral every 6 hours PRN  aluminum hydroxide/magnesium hydroxide/simethicone Suspension 30 milliLiter(s) Oral every 6 hours PRN  benzonatate 200 milliGRAM(s) Oral three times a day PRN  nitroglycerin     SubLingual 0.4 milliGRAM(s) SubLingual every 5 minutes PRN  oxycodone    5 mG/acetaminophen 325 mG 1 Tablet(s) Oral every 6 hours PRN    VITALS:   T(F): 97.3  HR: 60  BP: 154/72  RR: 18  SpO2: --    LABS:                        9.7    13.54 )-----------( 418      ( 17 Feb 2020 08:30 )             30.8     02-17    132<L>  |  96<L>  |  76<HH>  ----------------------------<  104<H>  4.9   |  21  |  2.6<H>    Ca    8.5      17 Feb 2020 08:30  Mg     2.4     02-17    TPro  6.2  /  Alb  3.0<L>  /  TBili  <0.2  /  DBili  x   /  AST  32  /  ALT  24  /  AlkPhos  88  02-17              Culture - Urine (collected 15 Feb 2020 18:20)  Source: .Urine Clean Catch (Midstream)  Final Report (16 Feb 2020 20:39):    <10,000 CFU/mL Normal Urogenital Rand            Cultures:    Culture - Urine (collected 15 Feb 2020 18:20)  Source: .Urine Clean Catch (Midstream)  Final Report (16 Feb 2020 20:39):    <10,000 CFU/mL Normal Urogenital Rand        RADIOLOGY:    PHYSICAL EXAM:  GEN: No acute distress  HEENT:   LUNGS: Clear to auscultation bilaterally   HEART: S1/S2 present. RRR.   ABD: Soft, non-tender, non-distended. Bowel sounds present  EXT:  NEURO: AAOX3 LENGTH OF HOSPITAL STAY: 4d    CHIEF COMPLAINT:   Patient is a 84y old  Female who presents with a chief complaint of Chest pain, right wrist pain (17 Feb 2020 11:55)      Overnight events:    No acute events overnight    ALLERGIES:  Keflex (Unknown)    MEDICATIONS:  STANDING MEDICATIONS  apixaban 2.5 milliGRAM(s) Oral every 12 hours  aspirin  chewable 81 milliGRAM(s) Oral daily  atorvastatin 80 milliGRAM(s) Oral at bedtime  calcium acetate 1334 milliGRAM(s) Oral three times a day with meals  ciprofloxacin  0.3% Otic Solution 1 Drop(s) Both Ears two times a day  metoprolol succinate ER 25 milliGRAM(s) Oral daily  pantoprazole    Tablet 40 milliGRAM(s) Oral before breakfast  predniSONE   Tablet 50 milliGRAM(s) Oral daily  sertraline 50 milliGRAM(s) Oral daily  sodium bicarbonate 650 milliGRAM(s) Oral every 8 hours    PRN MEDICATIONS  acetaminophen   Tablet .. 650 milliGRAM(s) Oral every 6 hours PRN  aluminum hydroxide/magnesium hydroxide/simethicone Suspension 30 milliLiter(s) Oral every 6 hours PRN  benzonatate 200 milliGRAM(s) Oral three times a day PRN  nitroglycerin     SubLingual 0.4 milliGRAM(s) SubLingual every 5 minutes PRN  oxycodone    5 mG/acetaminophen 325 mG 1 Tablet(s) Oral every 6 hours PRN    VITALS:   T(F): 97.3  HR: 60  BP: 154/72  RR: 18  SpO2: --    LABS:                        9.7    13.54 )-----------( 418      ( 17 Feb 2020 08:30 )             30.8     02-17    132<L>  |  96<L>  |  76<HH>  ----------------------------<  104<H>  4.9   |  21  |  2.6<H>    Ca    8.5      17 Feb 2020 08:30  Mg     2.4     02-17    TPro  6.2  /  Alb  3.0<L>  /  TBili  <0.2  /  DBili  x   /  AST  32  /  ALT  24  /  AlkPhos  88  02-17              Culture - Urine (collected 15 Feb 2020 18:20)  Source: .Urine Clean Catch (Midstream)  Final Report (16 Feb 2020 20:39):    <10,000 CFU/mL Normal Urogenital Rand            Cultures:    Culture - Urine (collected 15 Feb 2020 18:20)  Source: .Urine Clean Catch (Midstream)  Final Report (16 Feb 2020 20:39):    <10,000 CFU/mL Normal Urogenital Rand        RADIOLOGY:    PHYSICAL EXAM:  GEN: mild gastric pain  HEENT: CASH  LUNGS: Clear to auscultation bilaterally   HEART: S1/S2 present. RRR.   ABD: Soft, non-tender, non-distended. Bowel sounds present  EXT: non edematous, non erythematous  NEURO: AAOX3

## 2020-02-18 NOTE — CONSULT NOTE ADULT - SUBJECTIVE AND OBJECTIVE BOX
PGY III NOTE    LENGTH OF HOSPITAL STAY:  4d    CHIEF COMPLAINT:Patient is a 84y old  Female who presents with a chief complaint of Chest pain, right wrist pain (18 Feb 2020 09:12)    HPI:  85 yo F w/ hx of HTN, CKD IV, DVT/PE on Eliquis, HFpEF, AVB s/p PPM, gout not on home meds, rheumatoid arthritis not on home meds presenting for chest pain and right wrist pain. Chest pain is recurrent in the past, atypical in nature, multiple negative cath and stress test, reproducible on palpation. She also complaints of right wrist pain, edema and erythema as well as decreased range of motion, accompanied by temp of 100. WBC elevation (12->19->10), afebrile during hospitalization, knee XR shows b/l joint space narrowing and suprapatellar joint effusion, right wrist XR shows chronic/degenerative change and small erosions with chondrocalcinosis suggestive of CPPD arthropathy, , CRP 24.78, uric acid 11, RF 19,  Clindamycin x2 days, prednisone 50mg x6 days. Patient was also in [facility] d/t dehydration from diuretic use, creatinine baseline today.      PMH & PSH  PAST MEDICAL & SURGICAL HISTORY:  DVT, lower extremity  Rheumatoid arthritis  Diastolic heart failure  Diverticulitis: sigmoid  Gout  Hypertension  Pacemaker  Chronic kidney disease  History of hysterectomy  History of tonsillectomy  History of appendectomy  Artificial pacemaker    SOCIAL HISTORY: Negative    ALLERGIES: Keflex (Unknown)    HOME MEDICATIONS  Home Medications:  apixaban 2.5 mg oral tablet: 1 tab(s) orally 2 times a day (14 Feb 2020 01:41)  atorvastatin 80 mg oral tablet: 1 tab(s) orally once a day (14 Feb 2020 01:41)  calcitriol 0.25 mcg oral capsule: 1 cap(s) orally once a day (14 Feb 2020 01:41)  furosemide 40 mg oral tablet: 1 tab(s) orally once a day (14 Feb 2020 01:41)  metoprolol succinate 25 mg oral tablet, extended release: 1 tab(s) orally once a day (14 Feb 2020 01:41)  sertraline 50 mg oral tablet: 1 tab(s) orally once a day (14 Feb 2020 01:41)    PHYSICAL EXAM:  T(F): 97.3 (02-18-20 @ 04:37), Max: 97.3 (02-18-20 @ 04:37)  HR: 60 (02-18-20 @ 04:37)  BP: 154/72 (02-18-20 @ 04:37)  RR: 18 (02-18-20 @ 04:37)  SpO2: --  CAPILLARY BLOOD GLUCOSE      POCT Blood Glucose.: 92 mg/dL (18 Feb 2020 09:02)  POCT Blood Glucose.: 114 mg/dL (17 Feb 2020 21:51)  POCT Blood Glucose.: 113 mg/dL (17 Feb 2020 16:40)  POCT Blood Glucose.: 110 mg/dL (17 Feb 2020 12:27)    I&O's Summary    17 Feb 2020 07:01  -  18 Feb 2020 07:00  --------------------------------------------------------  IN: 100 mL / OUT: 250 mL / NET: -150 mL      General: NAD  HEENT: NCAT, no JVD  CV: RRR  RESP: CTAB  Abdominal: Soft, NTTP, non-distended  Extremity: no c/c/e  Neuro: A&O x3, non-focal    MEDICATIONS  STANDING MEDICATIONS  apixaban 2.5 milliGRAM(s) Oral every 12 hours  aspirin  chewable 81 milliGRAM(s) Oral daily  atorvastatin 80 milliGRAM(s) Oral at bedtime  calcium acetate 1334 milliGRAM(s) Oral three times a day with meals  ciprofloxacin  0.3% Otic Solution 1 Drop(s) Both Ears two times a day  metoprolol succinate ER 25 milliGRAM(s) Oral daily  pantoprazole    Tablet 40 milliGRAM(s) Oral before breakfast  predniSONE   Tablet 50 milliGRAM(s) Oral daily  sertraline 50 milliGRAM(s) Oral daily  sodium bicarbonate 650 milliGRAM(s) Oral every 8 hours  sodium polystyrene sulfonate Suspension 15 Gram(s) Oral once    PRN MEDICATIONS  acetaminophen   Tablet .. 650 milliGRAM(s) Oral every 6 hours PRN  aluminum hydroxide/magnesium hydroxide/simethicone Suspension 30 milliLiter(s) Oral every 6 hours PRN  benzonatate 200 milliGRAM(s) Oral three times a day PRN  nitroglycerin     SubLingual 0.4 milliGRAM(s) SubLingual every 5 minutes PRN  oxycodone    5 mG/acetaminophen 325 mG 1 Tablet(s) Oral every 6 hours PRN    LABS:                        9.9    9.59  )-----------( 413      ( 18 Feb 2020 07:03 )             30.7              02-18    139  |  103  |  71<HH>  ----------------------------<  86  5.6<H>   |  25  |  2.3<H>    Ca    8.3<L>      18 Feb 2020 07:03  Mg     2.5     02-18    TPro  6.2  /  Alb  3.0<L>  /  TBili  <0.2  /  DBili  x   /  AST  32  /  ALT  24  /  AlkPhos  88  02-17    LIVER FUNCTIONS - ( 17 Feb 2020 08:30 )  Alb: 3.0 g/dL / Pro: 6.2 g/dL / ALK PHOS: 88 U/L / ALT: 24 U/L / AST: 32 U/L / GGT: x                      Culture - Urine (collected 15 Feb 2020 18:20)  Source: .Urine Clean Catch (Midstream)  Final Report (16 Feb 2020 20:39):    <10,000 CFU/mL Normal Urogenital Rand      IMAGING:  < from: Xray Knee 3 Views, Bilateral (02.15.20 @ 13:49) >  impression:    Bilateral: Since prior, no acute displaced fracture or interval change. Bones are osteopenic. Tricompartmental bilateral joint space narrowing/severe degenerative change with articular flattening unchanged since prior. Bilateral small suprapatellar joint effusions also unchanged. Vascular calcifications present.    < end of copied text >    < from: Xray Wrist 2 Views, Right (02.13.20 @ 22:08) >  IMPRESSION:    1. Osteopenia without acute osseous abnormality.  2. Chronic/degenerative change as above. Apparent small erosions with chondrocalcinosis may be seen with underlying CPPD arthropathy.    < end of copied text > PGY III NOTE    LENGTH OF HOSPITAL STAY:  4d    CHIEF COMPLAINT:Patient is a 84y old  Female who presents with a chief complaint of Chest pain, right wrist pain (18 Feb 2020 09:12)    HPI:  83 yo F w/ hx of HTN, CKD IV, DVT/PE on Eliquis, HFpEF, AVB s/p PPM, gout not on home meds, rheumatoid arthritis not on home meds, b/l knee osteoarthritis presenting for chest pain and right wrist pain. Chest pain is recurrent in the past, atypical in nature, multiple negative cath and stress test, reproducible on palpation. She also complaints of right wrist pain, edema and erythema as well as decreased range of motion, accompanied by temp of 100. WBC elevation (12->19->10), afebrile during hospitalization, right wrist XR shows chronic/degenerative change and small erosions with chondrocalcinosis suggestive of CPPD arthropathy, , CRP 24.78, uric acid 11, RF 19,  Clindamycin x2 days, prednisone 50mg x6 days. Knee pain is at baseline, knee XR shows b/l joint space narrowing and suprapatellar joint effusion. Patient was also in [facility] d/t dehydration from diuretic use, creatinine baseline today. RA was diagnosed 5 years ago, last gout flare 1 year ago in [facility]TP joint, she does not follow up with rheumatologist regularly. Knee OA pain controlled with Tylenol at home, uses walker for ambulation at baseline. Admits to morning stiffness of 2 hours in hand joints.      PMH & PSH  PAST MEDICAL & SURGICAL HISTORY:  DVT, lower extremity  Rheumatoid arthritis  Diastolic heart failure  Diverticulitis: sigmoid  Gout  Hypertension  Pacemaker  Chronic kidney disease  History of hysterectomy  History of tonsillectomy  History of appendectomy  Artificial pacemaker    SOCIAL HISTORY: Negative    ALLERGIES: Keflex (Unknown)    HOME MEDICATIONS  Home Medications:  apixaban 2.5 mg oral tablet: 1 tab(s) orally 2 times a day (14 Feb 2020 01:41)  atorvastatin 80 mg oral tablet: 1 tab(s) orally once a day (14 Feb 2020 01:41)  calcitriol 0.25 mcg oral capsule: 1 cap(s) orally once a day (14 Feb 2020 01:41)  furosemide 40 mg oral tablet: 1 tab(s) orally once a day (14 Feb 2020 01:41)  metoprolol succinate 25 mg oral tablet, extended release: 1 tab(s) orally once a day (14 Feb 2020 01:41)  sertraline 50 mg oral tablet: 1 tab(s) orally once a day (14 Feb 2020 01:41)    PHYSICAL EXAM:  T(F): 97.3 (02-18-20 @ 04:37), Max: 97.3 (02-18-20 @ 04:37)  HR: 60 (02-18-20 @ 04:37)  BP: 154/72 (02-18-20 @ 04:37)  RR: 18 (02-18-20 @ 04:37)  SpO2: --  CAPILLARY BLOOD GLUCOSE      POCT Blood Glucose.: 92 mg/dL (18 Feb 2020 09:02)  POCT Blood Glucose.: 114 mg/dL (17 Feb 2020 21:51)  POCT Blood Glucose.: 113 mg/dL (17 Feb 2020 16:40)  POCT Blood Glucose.: 110 mg/dL (17 Feb 2020 12:27)    I&O's Summary    17 Feb 2020 07:01  -  18 Feb 2020 07:00  --------------------------------------------------------  IN: 100 mL / OUT: 250 mL / NET: -150 mL      General: NAD  HEENT: NCAT  CV: no JVD, nontender on palpation  RESP: no accessory muscle use  Abdominal: non-distended  Extremity:   b/l knee no warmth, no erythema, no significant effusion  b/l MCP and PCP enlarged, slight bogginess, slight pain to palpation, ulnar deviation of both hands  right wrist bogginess, decreased range of motion,   Neuro: A&O x4    MEDICATIONS  STANDING MEDICATIONS  apixaban 2.5 milliGRAM(s) Oral every 12 hours  aspirin  chewable 81 milliGRAM(s) Oral daily  atorvastatin 80 milliGRAM(s) Oral at bedtime  calcium acetate 1334 milliGRAM(s) Oral three times a day with meals  ciprofloxacin  0.3% Otic Solution 1 Drop(s) Both Ears two times a day  metoprolol succinate ER 25 milliGRAM(s) Oral daily  pantoprazole    Tablet 40 milliGRAM(s) Oral before breakfast  predniSONE   Tablet 50 milliGRAM(s) Oral daily  sertraline 50 milliGRAM(s) Oral daily  sodium bicarbonate 650 milliGRAM(s) Oral every 8 hours  sodium polystyrene sulfonate Suspension 15 Gram(s) Oral once    PRN MEDICATIONS  acetaminophen   Tablet .. 650 milliGRAM(s) Oral every 6 hours PRN  aluminum hydroxide/magnesium hydroxide/simethicone Suspension 30 milliLiter(s) Oral every 6 hours PRN  benzonatate 200 milliGRAM(s) Oral three times a day PRN  nitroglycerin     SubLingual 0.4 milliGRAM(s) SubLingual every 5 minutes PRN  oxycodone    5 mG/acetaminophen 325 mG 1 Tablet(s) Oral every 6 hours PRN    LABS:                        9.9    9.59  )-----------( 413      ( 18 Feb 2020 07:03 )             30.7              02-18    139  |  103  |  71<HH>  ----------------------------<  86  5.6<H>   |  25  |  2.3<H>    Ca    8.3<L>      18 Feb 2020 07:03  Mg     2.5     02-18    TPro  6.2  /  Alb  3.0<L>  /  TBili  <0.2  /  DBili  x   /  AST  32  /  ALT  24  /  AlkPhos  88  02-17    LIVER FUNCTIONS - ( 17 Feb 2020 08:30 )  Alb: 3.0 g/dL / Pro: 6.2 g/dL / ALK PHOS: 88 U/L / ALT: 24 U/L / AST: 32 U/L / GGT: x                      Culture - Urine (collected 15 Feb 2020 18:20)  Source: .Urine Clean Catch (Midstream)  Final Report (16 Feb 2020 20:39):    <10,000 CFU/mL Normal Urogenital Rand      IMAGING:  < from: Xray Knee 3 Views, Bilateral (02.15.20 @ 13:49) >  impression:    Bilateral: Since prior, no acute displaced fracture or interval change. Bones are osteopenic. Tricompartmental bilateral joint space narrowing/severe degenerative change with articular flattening unchanged since prior. Bilateral small suprapatellar joint effusions also unchanged. Vascular calcifications present.    < end of copied text >    < from: Xray Wrist 2 Views, Right (02.13.20 @ 22:08) >  IMPRESSION:    1. Osteopenia without acute osseous abnormality.  2. Chronic/degenerative change as above. Apparent small erosions with chondrocalcinosis may be seen with underlying CPPD arthropathy.    < end of copied text > PGY III NOTE    LENGTH OF HOSPITAL STAY:  4d    CHIEF COMPLAINT:Patient is a 84y old  Female who presents with a chief complaint of Chest pain, right wrist pain (18 Feb 2020 09:12)    HPI:  83 yo F w/ hx of HTN, CKD IV, DVT/PE on Eliquis, HFpEF, AVB s/p PPM, gout not on home meds, rheumatoid arthritis not on home meds, b/l knee osteoarthritis presenting for chest pain and right wrist pain. Chest pain is recurrent in the past, atypical in nature, multiple negative cath and stress test, reproducible on palpation. She also complaints of right wrist pain, edema and erythema as well as decreased range of motion, accompanied by temp of 100. WBC elevation (12->19->10), afebrile during hospitalization, right wrist XR shows chronic/degenerative change and small erosions with chondrocalcinosis suggestive of CPPD arthropathy, , CRP 24.78, uric acid 11, RF 19,  Clindamycin x2 days, prednisone 50mg x6 days. Knee pain is at baseline, knee XR shows b/l joint space narrowing and suprapatellar joint effusion. Patient was also in [facility] d/t dehydration from diuretic use, creatinine baseline today. RA was diagnosed 5 years ago, last gout flare 1 year ago in [facility] joint, she does not follow up with rheumatologist regularly. Knee OA pain controlled with Tylenol at home, uses walker for ambulation at baseline. Admits to morning stiffness of 2 hours in hand joints.      PMH & PSH  PAST MEDICAL & SURGICAL HISTORY:  DVT, lower extremity  Rheumatoid arthritis  Diastolic heart failure  Diverticulitis: sigmoid  Gout  Hypertension  Pacemaker  Chronic kidney disease  History of hysterectomy  History of tonsillectomy  History of appendectomy  Artificial pacemaker    SOCIAL HISTORY: Negative    ALLERGIES: Keflex (Unknown)    HOME MEDICATIONS  Home Medications:  apixaban 2.5 mg oral tablet: 1 tab(s) orally 2 times a day (14 Feb 2020 01:41)  atorvastatin 80 mg oral tablet: 1 tab(s) orally once a day (14 Feb 2020 01:41)  calcitriol 0.25 mcg oral capsule: 1 cap(s) orally once a day (14 Feb 2020 01:41)  furosemide 40 mg oral tablet: 1 tab(s) orally once a day (14 Feb 2020 01:41)  metoprolol succinate 25 mg oral tablet, extended release: 1 tab(s) orally once a day (14 Feb 2020 01:41)  sertraline 50 mg oral tablet: 1 tab(s) orally once a day (14 Feb 2020 01:41)    PHYSICAL EXAM:  T(F): 97.3 (02-18-20 @ 04:37), Max: 97.3 (02-18-20 @ 04:37)  HR: 60 (02-18-20 @ 04:37)  BP: 154/72 (02-18-20 @ 04:37)  RR: 18 (02-18-20 @ 04:37)  SpO2: --  CAPILLARY BLOOD GLUCOSE      POCT Blood Glucose.: 92 mg/dL (18 Feb 2020 09:02)  POCT Blood Glucose.: 114 mg/dL (17 Feb 2020 21:51)  POCT Blood Glucose.: 113 mg/dL (17 Feb 2020 16:40)  POCT Blood Glucose.: 110 mg/dL (17 Feb 2020 12:27)    I&O's Summary    17 Feb 2020 07:01  -  18 Feb 2020 07:00  --------------------------------------------------------  IN: 100 mL / OUT: 250 mL / NET: -150 mL      General: NAD, resting reclining in chair  HEENT: NCAT  CV: nontender CW on palpation  RESP: no accessory muscle use  Abdominal: non-distended  Extremity: No active synovitis or joint effusions  b/l knee no warmth, no erythema, no significant effusion  b/l MCP and PCP with bony hypertrophy, slight bogginess, mild ulnar deviation of both hands  right wrist with minimal tenderness, full passive ROM  Skin: No tophi  Neuro: A&O x4    MEDICATIONS  STANDING MEDICATIONS  apixaban 2.5 milliGRAM(s) Oral every 12 hours  aspirin  chewable 81 milliGRAM(s) Oral daily  atorvastatin 80 milliGRAM(s) Oral at bedtime  calcium acetate 1334 milliGRAM(s) Oral three times a day with meals  ciprofloxacin  0.3% Otic Solution 1 Drop(s) Both Ears two times a day  metoprolol succinate ER 25 milliGRAM(s) Oral daily  pantoprazole    Tablet 40 milliGRAM(s) Oral before breakfast  predniSONE   Tablet 50 milliGRAM(s) Oral daily  sertraline 50 milliGRAM(s) Oral daily  sodium bicarbonate 650 milliGRAM(s) Oral every 8 hours  sodium polystyrene sulfonate Suspension 15 Gram(s) Oral once    PRN MEDICATIONS  acetaminophen   Tablet .. 650 milliGRAM(s) Oral every 6 hours PRN  aluminum hydroxide/magnesium hydroxide/simethicone Suspension 30 milliLiter(s) Oral every 6 hours PRN  benzonatate 200 milliGRAM(s) Oral three times a day PRN  nitroglycerin     SubLingual 0.4 milliGRAM(s) SubLingual every 5 minutes PRN  oxycodone    5 mG/acetaminophen 325 mG 1 Tablet(s) Oral every 6 hours PRN    LABS:                        9.9    9.59  )-----------( 413      ( 18 Feb 2020 07:03 )             30.7              02-18    139  |  103  |  71<HH>  ----------------------------<  86  5.6<H>   |  25  |  2.3<H>    Ca    8.3<L>      18 Feb 2020 07:03  Mg     2.5     02-18    TPro  6.2  /  Alb  3.0<L>  /  TBili  <0.2  /  DBili  x   /  AST  32  /  ALT  24  /  AlkPhos  88  02-17    LIVER FUNCTIONS - ( 17 Feb 2020 08:30 )  Alb: 3.0 g/dL / Pro: 6.2 g/dL / ALK PHOS: 88 U/L / ALT: 24 U/L / AST: 32 U/L / GGT: x                      Culture - Urine (collected 15 Feb 2020 18:20)  Source: .Urine Clean Catch (Midstream)  Final Report (16 Feb 2020 20:39):    <10,000 CFU/mL Normal Urogenital Rand      IMAGING:  < from: Xray Knee 3 Views, Bilateral (02.15.20 @ 13:49) >  impression:    Bilateral: Since prior, no acute displaced fracture or interval change. Bones are osteopenic. Tricompartmental bilateral joint space narrowing/severe degenerative change with articular flattening unchanged since prior. Bilateral small suprapatellar joint effusions also unchanged. Vascular calcifications present.    < end of copied text >    < from: Xray Wrist 2 Views, Right (02.13.20 @ 22:08) >  IMPRESSION:    1. Osteopenia without acute osseous abnormality.  2. Chronic/degenerative change as above. Apparent small erosions with chondrocalcinosis may be seen with underlying CPPD arthropathy.    < end of copied text >

## 2020-02-19 ENCOUNTER — TRANSCRIPTION ENCOUNTER (OUTPATIENT)
Age: 85
End: 2020-02-19

## 2020-02-19 VITALS
TEMPERATURE: 98 F | RESPIRATION RATE: 18 BRPM | SYSTOLIC BLOOD PRESSURE: 129 MMHG | HEART RATE: 85 BPM | DIASTOLIC BLOOD PRESSURE: 63 MMHG

## 2020-02-19 LAB
ANION GAP SERPL CALC-SCNC: 13 MMOL/L — SIGNIFICANT CHANGE UP (ref 7–14)
ANION GAP SERPL CALC-SCNC: 13 MMOL/L — SIGNIFICANT CHANGE UP (ref 7–14)
BASOPHILS # BLD AUTO: 0.04 K/UL — SIGNIFICANT CHANGE UP (ref 0–0.2)
BASOPHILS NFR BLD AUTO: 0.3 % — SIGNIFICANT CHANGE UP (ref 0–1)
BUN SERPL-MCNC: 58 MG/DL — HIGH (ref 10–20)
BUN SERPL-MCNC: 61 MG/DL — CRITICAL HIGH (ref 10–20)
CALCIUM SERPL-MCNC: 8.5 MG/DL — SIGNIFICANT CHANGE UP (ref 8.5–10.1)
CALCIUM SERPL-MCNC: 8.7 MG/DL — SIGNIFICANT CHANGE UP (ref 8.5–10.1)
CHLORIDE SERPL-SCNC: 101 MMOL/L — SIGNIFICANT CHANGE UP (ref 98–110)
CHLORIDE SERPL-SCNC: 98 MMOL/L — SIGNIFICANT CHANGE UP (ref 98–110)
CO2 SERPL-SCNC: 25 MMOL/L — SIGNIFICANT CHANGE UP (ref 17–32)
CO2 SERPL-SCNC: 26 MMOL/L — SIGNIFICANT CHANGE UP (ref 17–32)
CREAT SERPL-MCNC: 2.3 MG/DL — HIGH (ref 0.7–1.5)
CREAT SERPL-MCNC: 2.5 MG/DL — HIGH (ref 0.7–1.5)
EOSINOPHIL # BLD AUTO: 0.19 K/UL — SIGNIFICANT CHANGE UP (ref 0–0.7)
EOSINOPHIL NFR BLD AUTO: 1.7 % — SIGNIFICANT CHANGE UP (ref 0–8)
GLUCOSE BLDC GLUCOMTR-MCNC: 109 MG/DL — HIGH (ref 70–99)
GLUCOSE BLDC GLUCOMTR-MCNC: 188 MG/DL — HIGH (ref 70–99)
GLUCOSE SERPL-MCNC: 109 MG/DL — HIGH (ref 70–99)
GLUCOSE SERPL-MCNC: 136 MG/DL — HIGH (ref 70–99)
HCT VFR BLD CALC: 32.5 % — LOW (ref 37–47)
HGB BLD-MCNC: 10.4 G/DL — LOW (ref 12–16)
IMM GRANULOCYTES NFR BLD AUTO: 3.2 % — HIGH (ref 0.1–0.3)
LYMPHOCYTES # BLD AUTO: 1.48 K/UL — SIGNIFICANT CHANGE UP (ref 1.2–3.4)
LYMPHOCYTES # BLD AUTO: 12.9 % — LOW (ref 20.5–51.1)
MAGNESIUM SERPL-MCNC: 2.4 MG/DL — SIGNIFICANT CHANGE UP (ref 1.8–2.4)
MCHC RBC-ENTMCNC: 29.9 PG — SIGNIFICANT CHANGE UP (ref 27–31)
MCHC RBC-ENTMCNC: 32 G/DL — SIGNIFICANT CHANGE UP (ref 32–37)
MCV RBC AUTO: 93.4 FL — SIGNIFICANT CHANGE UP (ref 81–99)
MONOCYTES # BLD AUTO: 0.55 K/UL — SIGNIFICANT CHANGE UP (ref 0.1–0.6)
MONOCYTES NFR BLD AUTO: 4.8 % — SIGNIFICANT CHANGE UP (ref 1.7–9.3)
NEUTROPHILS # BLD AUTO: 8.85 K/UL — HIGH (ref 1.4–6.5)
NEUTROPHILS NFR BLD AUTO: 77.1 % — HIGH (ref 42.2–75.2)
NRBC # BLD: 0 /100 WBCS — SIGNIFICANT CHANGE UP (ref 0–0)
PLATELET # BLD AUTO: 437 K/UL — HIGH (ref 130–400)
POTASSIUM SERPL-MCNC: 4.9 MMOL/L — SIGNIFICANT CHANGE UP (ref 3.5–5)
POTASSIUM SERPL-MCNC: 4.9 MMOL/L — SIGNIFICANT CHANGE UP (ref 3.5–5)
POTASSIUM SERPL-SCNC: 4.9 MMOL/L — SIGNIFICANT CHANGE UP (ref 3.5–5)
POTASSIUM SERPL-SCNC: 4.9 MMOL/L — SIGNIFICANT CHANGE UP (ref 3.5–5)
RBC # BLD: 3.48 M/UL — LOW (ref 4.2–5.4)
RBC # FLD: 12.5 % — SIGNIFICANT CHANGE UP (ref 11.5–14.5)
SODIUM SERPL-SCNC: 137 MMOL/L — SIGNIFICANT CHANGE UP (ref 135–146)
SODIUM SERPL-SCNC: 139 MMOL/L — SIGNIFICANT CHANGE UP (ref 135–146)
WBC # BLD: 11.48 K/UL — HIGH (ref 4.8–10.8)
WBC # FLD AUTO: 11.48 K/UL — HIGH (ref 4.8–10.8)

## 2020-02-19 RX ORDER — CALCITRIOL 0.5 UG/1
1 CAPSULE ORAL
Qty: 0 | Refills: 0 | DISCHARGE

## 2020-02-19 RX ORDER — MECLIZINE HCL 12.5 MG
25 TABLET ORAL EVERY 8 HOURS
Refills: 0 | Status: DISCONTINUED | OUTPATIENT
Start: 2020-02-19 | End: 2020-02-19

## 2020-02-19 RX ORDER — MECLIZINE HCL 12.5 MG
1 TABLET ORAL
Qty: 0 | Refills: 0 | DISCHARGE
Start: 2020-02-19

## 2020-02-19 RX ORDER — ASPIRIN/CALCIUM CARB/MAGNESIUM 324 MG
1 TABLET ORAL
Qty: 0 | Refills: 0 | DISCHARGE
Start: 2020-02-19

## 2020-02-19 RX ORDER — CALCIUM ACETATE 667 MG
1 TABLET ORAL
Qty: 0 | Refills: 0 | DISCHARGE
Start: 2020-02-19

## 2020-02-19 RX ORDER — CALCIUM ACETATE 667 MG
2 TABLET ORAL
Qty: 0 | Refills: 0 | DISCHARGE
Start: 2020-02-19

## 2020-02-19 RX ADMIN — Medication 1334 MILLIGRAM(S): at 17:04

## 2020-02-19 RX ADMIN — Medication 1334 MILLIGRAM(S): at 11:34

## 2020-02-19 RX ADMIN — APIXABAN 2.5 MILLIGRAM(S): 2.5 TABLET, FILM COATED ORAL at 17:04

## 2020-02-19 RX ADMIN — Medication 1 DROP(S): at 17:04

## 2020-02-19 RX ADMIN — Medication 25 MILLIGRAM(S): at 11:35

## 2020-02-19 RX ADMIN — Medication 1 DROP(S): at 05:46

## 2020-02-19 RX ADMIN — APIXABAN 2.5 MILLIGRAM(S): 2.5 TABLET, FILM COATED ORAL at 05:46

## 2020-02-19 RX ADMIN — Medication 40 MILLIGRAM(S): at 05:46

## 2020-02-19 RX ADMIN — SERTRALINE 50 MILLIGRAM(S): 25 TABLET, FILM COATED ORAL at 11:34

## 2020-02-19 RX ADMIN — PANTOPRAZOLE SODIUM 40 MILLIGRAM(S): 20 TABLET, DELAYED RELEASE ORAL at 05:46

## 2020-02-19 RX ADMIN — Medication 81 MILLIGRAM(S): at 11:34

## 2020-02-19 RX ADMIN — Medication 25 MILLIGRAM(S): at 05:46

## 2020-02-19 NOTE — PROGRESS NOTE ADULT - ASSESSMENT
Pt with CKD stage 4/5, HTN, RA, CHF, admitted with CP.    #CKD 4 - creat stable   - per notes  pt states she does not want HD if needed in the future, follows with Dr Urrutia  - ph 5.1 , on binders   -kidney  sono  - small kidneys no hydro  - keep holding lasix no IVF  , seem euvolemic on exam   -non oliguric   Anemia - iron stores, no need for ELIJAH   -2D Echo no pericardial effusion / diastolic dysfunction   - will sign off recall PRN please

## 2020-02-19 NOTE — PROGRESS NOTE ADULT - SUBJECTIVE AND OBJECTIVE BOX
Nephrology progress note    THIS IS AN INCOMPLETE NOTE . FULL NOTE TO FOLLOW SHORTLY    Patient is seen and examined, events over the last 24 h noted .    Allergies:  Keflex (Unknown)    Hospital Medications:   MEDICATIONS  (STANDING):  apixaban 2.5 milliGRAM(s) Oral every 12 hours  aspirin  chewable 81 milliGRAM(s) Oral daily  atorvastatin 80 milliGRAM(s) Oral at bedtime  calcium acetate 1334 milliGRAM(s) Oral three times a day with meals  ciprofloxacin  0.3% Otic Solution 1 Drop(s) Both Ears two times a day  lactulose Syrup 10 Gram(s) Oral every 2 hours  metoprolol succinate ER 25 milliGRAM(s) Oral daily  pantoprazole    Tablet 40 milliGRAM(s) Oral before breakfast  predniSONE   Tablet   Oral   sertraline 50 milliGRAM(s) Oral daily        VITALS:  T(F): 97.1 (20 @ 05:00), Max: 98.2 (20 @ 12:25)  HR: 69 (20 @ 05:00)  BP: 128/64 (20 @ 05:00)  RR: 18 (20 @ 07:31)  SpO2: 98% (20 @ 07:31)  Wt(kg): --     @ 07:01  -   @ 07:00  --------------------------------------------------------  IN: 100 mL / OUT: 250 mL / NET: -150 mL     @ 07:01  -   @ 07:00  --------------------------------------------------------  IN: 0 mL / OUT: 350 mL / NET: -350 mL          PHYSICAL EXAM:  Constitutional: NAD  HEENT: anicteric sclera, oropharynx clear, MMM  Neck: No JVD  Respiratory: CTAB, no wheezes, rales or rhonchi  Cardiovascular: S1, S2, RRR  Gastrointestinal: BS+, soft, NT/ND  Extremities: No cyanosis or clubbing. No peripheral edema  :  No carlos.   Skin: No rashes    LABS:      137  |  98  |  61<HH>  ----------------------------<  136<H>  4.9   |  26  |  2.5<H>    Ca    8.7      2020 22:13  Mg     2.5                                 10.4   11.48 )-----------( 437      ( 2020 08:12 )             32.5       Urine Studies:  Urinalysis Basic - ( 2020 14:20 )    Color: Light Yellow / Appearance: Clear / S.013 / pH:   Gluc:  / Ketone: Negative  / Bili: Negative / Urobili: <2 mg/dL   Blood:  / Protein: Trace / Nitrite: Negative   Leuk Esterase: Small / RBC: 12 /HPF / WBC 10 /HPF   Sq Epi:  / Non Sq Epi: 2 /HPF / Bacteria: Negative      Osmolality, Random Urine: 301 mos/kg (02-15 @ 18:20)  Sodium, Random Urine: <20.0 mmoL/L (02-15 @ 18:20)  Creatinine, Random Urine: 76 mg/dL (02-15 @ 18:20)  Protein/Creatinine Ratio Calculation: 0.4 Ratio (02-15 @ 18:20)    RADIOLOGY & ADDITIONAL STUDIES: Nephrology progress note  Patient is seen and examined, events over the last 24 h noted .  denied any complaints   No events     Allergies:  Keflex (Unknown)    Hospital Medications:   MEDICATIONS  (STANDING):    apixaban 2.5 milliGRAM(s) Oral every 12 hours  aspirin  chewable 81 milliGRAM(s) Oral daily  atorvastatin 80 milliGRAM(s) Oral at bedtime  calcium acetate 1334 milliGRAM(s) Oral three times a day with meals  ciprofloxacin  0.3% Otic Solution 1 Drop(s) Both Ears two times a day  lactulose Syrup 10 Gram(s) Oral every 2 hours  metoprolol succinate ER 25 milliGRAM(s) Oral daily  pantoprazole    Tablet 40 milliGRAM(s) Oral before breakfast  predniSONE   Tablet   Oral   sertraline 50 milliGRAM(s) Oral daily        VITALS:  T(F): 97.1 (20 @ 05:00), Max: 98.2 (20 @ 12:25)  HR: 69 (20 @ 05:00)  BP: 128/64 (20 @ 05:00)  RR: 18 (20 @ 07:31)  SpO2: 98% (20 @ 07:31)       @ 07:01  -   @ 07:00  --------------------------------------------------------  IN: 100 mL / OUT: 250 mL / NET: -150 mL     @ 07:01  -   @ 07:00  --------------------------------------------------------  IN: 0 mL / OUT: 350 mL / NET: -350 mL          PHYSICAL EXAM:  Constitutional: NAD  HEENT: anicteric sclera, oropharynx clear, MMM  Neck: No JVD  Respiratory: CTAB, no wheezes, rales or rhonchi  Cardiovascular: S1, S2, RRR  Gastrointestinal: BS+, soft, NT/ND  Extremities: No cyanosis or clubbing. No peripheral edema  :  No carlos.   Skin: No rashes    LABS:      139  |  101  |  58<H>  ----------------------------<  109<H>  4.9   |  25  |  2.3<H>    Ca    8.5      2020 08:12  Mg     2.4           Creatinine Trend: 2.3<--, 2.5<--, 2.3<--, 2.6<--, 2.7<--, 2.8<--      137  |  98  |  61<HH>  ----------------------------<  136<H>  4.9   |  26  |  2.5<H>    Ca    8.7      2020 22:13  Mg     2.5                                 10.4   11.48 )-----------( 437      ( 2020 08:12 )             32.5       Urine Studies:  Urinalysis Basic - ( 2020 14:20 )    Color: Light Yellow / Appearance: Clear / S.013 / pH:   Gluc:  / Ketone: Negative  / Bili: Negative / Urobili: <2 mg/dL   Blood:  / Protein: Trace / Nitrite: Negative   Leuk Esterase: Small / RBC: 12 /HPF / WBC 10 /HPF   Sq Epi:  / Non Sq Epi: 2 /HPF / Bacteria: Negative      Osmolality, Random Urine: 301 mos/kg (02-15 @ 18:20)  Sodium, Random Urine: <20.0 mmoL/L (02-15 @ 18:20)  Creatinine, Random Urine: 76 mg/dL (02-15 @ 18:20)  Protein/Creatinine Ratio Calculation: 0.4 Ratio (02-15 @ 18:20)    RADIOLOGY & ADDITIONAL STUDIES:

## 2020-02-19 NOTE — PROGRESS NOTE ADULT - ASSESSMENT
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 Mckinney : non cardiac so OP follow up  - c/w Aspirin 81 mg daily, continue Lipitor and beta blockers      # Right wrist pain with inflammatory findings,likely rheumatoid arthritis flare   - WBC Count: 10<< 13<<19.03 <<11.00 <<12.34    - wrist x ray : mod degenerative changes, chondrocalcinosis,  no fractures  - ESR: 126, CRP : 24.78, RF 19 ,  - Pain control with Percocet. Avoid NSAIDs given age, OMERO and cardiac history  - continue Prednisone taper  - rheum consulted    Higher suspicion for crystalline arthropathy flare > RA flare given monoarticular nature of flare s/p URI    No evidence of active inflammatory arthritis on exam.    Would pursue quick taper decreasing dose daily by 10 mg increments then stop. Noted markedly elevated ESR>CRP    Should f/u with outpatient rheumatologist (had lapsed in care for > 1 year). Not currently on DMARD therapy for hx RA -  reports difficulty tolerating MTX in the past. Encouraged f/u for both hx RA and clinically dx crystalline arthropathy.    # right knee swelling:  - Xray - unremarkable  - uric acid = 11   - pt has hx of gout    # OMERO on CKD 4  - Creatinine Trend: 2.6<-2.8<--, 3.2<--, 2.3<--, 2.3<--, 2.4<--, 2.3<--baseline of 2.3   - Will hold Lasix for now as clinically not in volume overload   - US of the kidneys and bladder: no hydro, small kidneys  - Monitor urine output , please document (RN notified)  - Nephrology consult : MEt acidosis - treated with NA bicarb 650 mg po tid, now dced  - Calcitriol on hold given normal calcium level. 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 Will hold off on starting ACEi given OMERO, previous cardio notes also mentioning that patient is very sensitive to ACEi     # History of provoked DVT and PE (2019)  - Continue Eliquis 2.5 mg BID   - recent venous doppler of the lower extremities negative for DVT (02/07/2020)     # HTN   -  Continue Toprol 25 mg daily     # LUTS with leucocytosis UTI unlikely  - UA negative    # Miscellaneous   ·	DVT prophylaxis :  Eliquis   ·	GI prophylaxis : Protonix , maalox  ·	Activity : Ambulate with assistance , PT/Rehab  ·	Diet : DASH TLC renal diet   ·	Full code   Pending:, RHeum consulted,  Dispo: SNF

## 2020-02-19 NOTE — DISCHARGE NOTE NURSING/CASE MANAGEMENT/SOCIAL WORK - PATIENT PORTAL LINK FT
You can access the FollowMyHealth Patient Portal offered by [facility] by registering at the following website: [email or web address]. By joining [facility]’s FollowMyHealth portal, you will also be able to view your health information using other applications (apps) compatible with our system.

## 2020-02-19 NOTE — PROGRESS NOTE ADULT - ASSESSMENT
Atypical left sided chest pain likely GERD or musculoskeletal - improved  ra  crystalline arthropathy  samantha/ckd      dc home

## 2020-02-19 NOTE — PROGRESS NOTE ADULT - REASON FOR ADMISSION
Chest pain, right wrist pain

## 2020-02-19 NOTE — PROGRESS NOTE ADULT - SUBJECTIVE AND OBJECTIVE BOX
Patient was seen and examined. Spoke with RN. Chart reviewed.    No events overnight.  Vital Signs Last 24 Hrs  T(F): 97.6 (2020 14:10), Max: 98 (2020 19:10)  HR: 85 (2020 14:10) (62 - 85)  BP: 129/63 (2020 14:10) (128/64 - 143/75)  SpO2: 98% (2020 07:31) (98% - 99%)  MEDICATIONS  (STANDING):  apixaban 2.5 milliGRAM(s) Oral every 12 hours  aspirin  chewable 81 milliGRAM(s) Oral daily  atorvastatin 80 milliGRAM(s) Oral at bedtime  calcium acetate 1334 milliGRAM(s) Oral three times a day with meals  ciprofloxacin  0.3% Otic Solution 1 Drop(s) Both Ears two times a day  lactulose Syrup 10 Gram(s) Oral every 2 hours  metoprolol succinate ER 25 milliGRAM(s) Oral daily  pantoprazole    Tablet 40 milliGRAM(s) Oral before breakfast  predniSONE   Tablet   Oral   sertraline 50 milliGRAM(s) Oral daily    MEDICATIONS  (PRN):  acetaminophen   Tablet .. 650 milliGRAM(s) Oral every 6 hours PRN Moderate Pain (4 - 6)  aluminum hydroxide/magnesium hydroxide/simethicone Suspension 30 milliLiter(s) Oral every 6 hours PRN Dyspepsia  benzonatate 200 milliGRAM(s) Oral three times a day PRN Cough  meclizine 25 milliGRAM(s) Oral every 8 hours PRN Vertigo  nitroglycerin     SubLingual 0.4 milliGRAM(s) SubLingual every 5 minutes PRN Chest Pain  oxycodone    5 mG/acetaminophen 325 mG 1 Tablet(s) Oral every 6 hours PRN Severe Pain (7 - 10)    Labs:                        10.4   11.48 )-----------( 437      ( 2020 08:12 )             32.5                         9.9    9.59  )-----------( 413      ( 2020 07:03 )             30.7     2020 08:12    139    |  101    |  58     ----------------------------<  109    4.9     |  25     |  2.3    2020 22:13    137    |  98     |  61     ----------------------------<  136    4.9     |  26     |  2.5      Ca    8.5        2020 08:12  Ca    8.7        2020 22:13  Mg     2.4       2020 08:12  Mg     2.5       2020 07:03        Urinalysis Basic - ( 2020 14:20 )    Color: Light Yellow / Appearance: Clear / S.013 / pH: x  Gluc: x / Ketone: Negative  / Bili: Negative / Urobili: <2 mg/dL   Blood: x / Protein: Trace / Nitrite: Negative   Leuk Esterase: Small / RBC: 12 /HPF / WBC 10 /HPF   Sq Epi: x / Non Sq Epi: 2 /HPF / Bacteria: Negative          Radiology:    General: comfortable, NAD  Neurology: A&Ox3, nonfocal  Head:  Normocephalic, atraumatic  ENT:  Mucosa moist, no ulcerations  Neck:  Supple, no JVD,   Skin: no breakdowns (as per RN)  Resp: CTA B/L  CV: RRR, S1S2,   GI: Soft, NT, bowel sounds  MS: No edema, + peripheral pulses, FROM all 4 extremity

## 2020-02-19 NOTE — PROGRESS NOTE ADULT - SUBJECTIVE AND OBJECTIVE BOX
LENGTH OF HOSPITAL STAY: 5d    CHIEF COMPLAINT:   Patient is a 84y old  Female who presents with a chief complaint of Chest pain, right wrist pain (2020 13:34)  Chest pain is resolved.  Abdominal pain is improved  Complaining of Dizziness in the morning     Overnight events:    No acute events overnight    ALLERGIES:  Keflex (Unknown)    MEDICATIONS:  STANDING MEDICATIONS  apixaban 2.5 milliGRAM(s) Oral every 12 hours  aspirin  chewable 81 milliGRAM(s) Oral daily  atorvastatin 80 milliGRAM(s) Oral at bedtime  calcium acetate 1334 milliGRAM(s) Oral three times a day with meals  ciprofloxacin  0.3% Otic Solution 1 Drop(s) Both Ears two times a day  lactulose Syrup 10 Gram(s) Oral every 2 hours  metoprolol succinate ER 25 milliGRAM(s) Oral daily  pantoprazole    Tablet 40 milliGRAM(s) Oral before breakfast  predniSONE   Tablet   Oral   sertraline 50 milliGRAM(s) Oral daily    PRN MEDICATIONS  acetaminophen   Tablet .. 650 milliGRAM(s) Oral every 6 hours PRN  aluminum hydroxide/magnesium hydroxide/simethicone Suspension 30 milliLiter(s) Oral every 6 hours PRN  benzonatate 200 milliGRAM(s) Oral three times a day PRN  nitroglycerin     SubLingual 0.4 milliGRAM(s) SubLingual every 5 minutes PRN  oxycodone    5 mG/acetaminophen 325 mG 1 Tablet(s) Oral every 6 hours PRN    VITALS:   T(F): 97.1  HR: 69  BP: 128/64  RR: 18  SpO2: 98%    LABS:                        9.9    9.59  )-----------( 413      ( 2020 07:03 )             30.7     02-18    137  |  98  |  61<HH>  ----------------------------<  136<H>  4.9   |  26  |  2.5<H>    Ca    8.7      2020 22:13  Mg     2.5     -    TPro  6.2  /  Alb  3.0<L>  /  TBili  <0.2  /  DBili  x   /  AST  32  /  ALT  24  /  AlkPhos  88  02-17      Urinalysis Basic - ( 2020 14:20 )    Color: Light Yellow / Appearance: Clear / S.013 / pH: x  Gluc: x / Ketone: Negative  / Bili: Negative / Urobili: <2 mg/dL   Blood: x / Protein: Trace / Nitrite: Negative   Leuk Esterase: Small / RBC: 12 /HPF / WBC 10 /HPF   Sq Epi: x / Non Sq Epi: 2 /HPF / Bacteria: Negative                  Cultures:      RADIOLOGY:    PHYSICAL EXAM:  GEN: mild gastric pain  HEENT: CASH  LUNGS: Clear to auscultation bilaterally   HEART: S1/S2 present. RRR.   ABD: Soft, non-tender, non-distended. Bowel sounds present  EXT: non edematous, non erythematous  NEURO: AAOX3

## 2020-02-25 DIAGNOSIS — M06.831 OTHER SPECIFIED RHEUMATOID ARTHRITIS, RIGHT WRIST: ICD-10-CM

## 2020-02-25 DIAGNOSIS — M11.231 OTHER CHONDROCALCINOSIS, RIGHT WRIST: ICD-10-CM

## 2020-02-25 DIAGNOSIS — I42.8 OTHER CARDIOMYOPATHIES: ICD-10-CM

## 2020-02-25 DIAGNOSIS — I13.0 HYPERTENSIVE HEART AND CHRONIC KIDNEY DISEASE WITH HEART FAILURE AND STAGE 1 THROUGH STAGE 4 CHRONIC KIDNEY DISEASE, OR UNSPECIFIED CHRONIC KIDNEY DISEASE: ICD-10-CM

## 2020-02-25 DIAGNOSIS — I50.32 CHRONIC DIASTOLIC (CONGESTIVE) HEART FAILURE: ICD-10-CM

## 2020-02-25 DIAGNOSIS — N18.4 CHRONIC KIDNEY DISEASE, STAGE 4 (SEVERE): ICD-10-CM

## 2020-02-25 DIAGNOSIS — R07.89 OTHER CHEST PAIN: ICD-10-CM

## 2020-02-25 DIAGNOSIS — M10.9 GOUT, UNSPECIFIED: ICD-10-CM

## 2020-02-25 DIAGNOSIS — I48.91 UNSPECIFIED ATRIAL FIBRILLATION: ICD-10-CM

## 2020-02-25 DIAGNOSIS — K21.9 GASTRO-ESOPHAGEAL REFLUX DISEASE WITHOUT ESOPHAGITIS: ICD-10-CM

## 2020-02-25 DIAGNOSIS — E87.2 ACIDOSIS: ICD-10-CM

## 2020-02-25 DIAGNOSIS — Z95.0 PRESENCE OF CARDIAC PACEMAKER: ICD-10-CM

## 2020-02-25 DIAGNOSIS — N39.0 URINARY TRACT INFECTION, SITE NOT SPECIFIED: ICD-10-CM

## 2020-02-25 DIAGNOSIS — N17.9 ACUTE KIDNEY FAILURE, UNSPECIFIED: ICD-10-CM

## 2020-02-25 DIAGNOSIS — E86.0 DEHYDRATION: ICD-10-CM

## 2020-06-20 ENCOUNTER — INPATIENT (INPATIENT)
Facility: HOSPITAL | Age: 85
LOS: 2 days | Discharge: HOME HEALTH | End: 2020-06-23
Attending: INTERNAL MEDICINE | Admitting: INTERNAL MEDICINE
Payer: MEDICARE

## 2020-06-20 VITALS
OXYGEN SATURATION: 98 % | SYSTOLIC BLOOD PRESSURE: 140 MMHG | RESPIRATION RATE: 18 BRPM | TEMPERATURE: 99 F | DIASTOLIC BLOOD PRESSURE: 67 MMHG | HEART RATE: 83 BPM

## 2020-06-20 DIAGNOSIS — R50.9 FEVER, UNSPECIFIED: ICD-10-CM

## 2020-06-20 DIAGNOSIS — Z90.89 ACQUIRED ABSENCE OF OTHER ORGANS: Chronic | ICD-10-CM

## 2020-06-20 DIAGNOSIS — D63.1 ANEMIA IN CHRONIC KIDNEY DISEASE: ICD-10-CM

## 2020-06-20 DIAGNOSIS — Z95.810 PRESENCE OF AUTOMATIC (IMPLANTABLE) CARDIAC DEFIBRILLATOR: ICD-10-CM

## 2020-06-20 DIAGNOSIS — Z90.710 ACQUIRED ABSENCE OF BOTH CERVIX AND UTERUS: Chronic | ICD-10-CM

## 2020-06-20 DIAGNOSIS — Z90.49 ACQUIRED ABSENCE OF OTHER SPECIFIED PARTS OF DIGESTIVE TRACT: Chronic | ICD-10-CM

## 2020-06-20 DIAGNOSIS — Z95.0 PRESENCE OF CARDIAC PACEMAKER: Chronic | ICD-10-CM

## 2020-06-20 PROBLEM — M06.9 RHEUMATOID ARTHRITIS, UNSPECIFIED: Chronic | Status: ACTIVE | Noted: 2020-02-14

## 2020-06-20 LAB
ANION GAP SERPL CALC-SCNC: 12 MMOL/L — SIGNIFICANT CHANGE UP (ref 7–14)
ANION GAP SERPL CALC-SCNC: 16 MMOL/L — HIGH (ref 7–14)
APPEARANCE UR: CLEAR — SIGNIFICANT CHANGE UP
BACTERIA # UR AUTO: NEGATIVE — SIGNIFICANT CHANGE UP
BILIRUB UR-MCNC: NEGATIVE — SIGNIFICANT CHANGE UP
BUN SERPL-MCNC: 35 MG/DL — HIGH (ref 10–20)
BUN SERPL-MCNC: 36 MG/DL — HIGH (ref 10–20)
CALCIUM SERPL-MCNC: 8.6 MG/DL — SIGNIFICANT CHANGE UP (ref 8.5–10.1)
CALCIUM SERPL-MCNC: 8.9 MG/DL — SIGNIFICANT CHANGE UP (ref 8.5–10.1)
CHLORIDE SERPL-SCNC: 101 MMOL/L — SIGNIFICANT CHANGE UP (ref 98–110)
CHLORIDE SERPL-SCNC: 105 MMOL/L — SIGNIFICANT CHANGE UP (ref 98–110)
CO2 SERPL-SCNC: 25 MMOL/L — SIGNIFICANT CHANGE UP (ref 17–32)
CO2 SERPL-SCNC: 27 MMOL/L — SIGNIFICANT CHANGE UP (ref 17–32)
COLOR SPEC: SIGNIFICANT CHANGE UP
CREAT SERPL-MCNC: 2.2 MG/DL — HIGH (ref 0.7–1.5)
CREAT SERPL-MCNC: 2.2 MG/DL — HIGH (ref 0.7–1.5)
DIFF PNL FLD: SIGNIFICANT CHANGE UP
EPI CELLS # UR: 3 /HPF — SIGNIFICANT CHANGE UP (ref 0–5)
GLUCOSE SERPL-MCNC: 110 MG/DL — HIGH (ref 70–99)
GLUCOSE SERPL-MCNC: 145 MG/DL — HIGH (ref 70–99)
GLUCOSE UR QL: NEGATIVE — SIGNIFICANT CHANGE UP
HCT VFR BLD CALC: 28.6 % — LOW (ref 37–47)
HCT VFR BLD CALC: 34.4 % — LOW (ref 37–47)
HGB BLD-MCNC: 10.6 G/DL — LOW (ref 12–16)
HGB BLD-MCNC: 8.8 G/DL — LOW (ref 12–16)
HYALINE CASTS # UR AUTO: 5 /LPF — SIGNIFICANT CHANGE UP (ref 0–7)
KETONES UR-MCNC: NEGATIVE — SIGNIFICANT CHANGE UP
LEUKOCYTE ESTERASE UR-ACNC: NEGATIVE — SIGNIFICANT CHANGE UP
MCHC RBC-ENTMCNC: 28.6 PG — SIGNIFICANT CHANGE UP (ref 27–31)
MCHC RBC-ENTMCNC: 28.7 PG — SIGNIFICANT CHANGE UP (ref 27–31)
MCHC RBC-ENTMCNC: 30.8 G/DL — LOW (ref 32–37)
MCHC RBC-ENTMCNC: 30.8 G/DL — LOW (ref 32–37)
MCV RBC AUTO: 92.9 FL — SIGNIFICANT CHANGE UP (ref 81–99)
MCV RBC AUTO: 93.2 FL — SIGNIFICANT CHANGE UP (ref 81–99)
NITRITE UR-MCNC: NEGATIVE — SIGNIFICANT CHANGE UP
NRBC # BLD: 0 /100 WBCS — SIGNIFICANT CHANGE UP (ref 0–0)
NRBC # BLD: 0 /100 WBCS — SIGNIFICANT CHANGE UP (ref 0–0)
PH UR: 6 — SIGNIFICANT CHANGE UP (ref 5–8)
PLATELET # BLD AUTO: 176 K/UL — SIGNIFICANT CHANGE UP (ref 130–400)
PLATELET # BLD AUTO: 195 K/UL — SIGNIFICANT CHANGE UP (ref 130–400)
POTASSIUM SERPL-MCNC: 3.4 MMOL/L — LOW (ref 3.5–5)
POTASSIUM SERPL-MCNC: 3.9 MMOL/L — SIGNIFICANT CHANGE UP (ref 3.5–5)
POTASSIUM SERPL-SCNC: 3.4 MMOL/L — LOW (ref 3.5–5)
POTASSIUM SERPL-SCNC: 3.9 MMOL/L — SIGNIFICANT CHANGE UP (ref 3.5–5)
PROT UR-MCNC: ABNORMAL
RBC # BLD: 3.08 M/UL — LOW (ref 4.2–5.4)
RBC # BLD: 3.69 M/UL — LOW (ref 4.2–5.4)
RBC # FLD: 14.1 % — SIGNIFICANT CHANGE UP (ref 11.5–14.5)
RBC # FLD: 14.1 % — SIGNIFICANT CHANGE UP (ref 11.5–14.5)
RBC CASTS # UR COMP ASSIST: 2 /HPF — SIGNIFICANT CHANGE UP (ref 0–4)
SODIUM SERPL-SCNC: 142 MMOL/L — SIGNIFICANT CHANGE UP (ref 135–146)
SODIUM SERPL-SCNC: 144 MMOL/L — SIGNIFICANT CHANGE UP (ref 135–146)
SP GR SPEC: 1.01 — SIGNIFICANT CHANGE UP (ref 1.01–1.02)
TROPONIN T SERPL-MCNC: 0.04 NG/ML — CRITICAL HIGH
UROBILINOGEN FLD QL: SIGNIFICANT CHANGE UP
WBC # BLD: 10.74 K/UL — SIGNIFICANT CHANGE UP (ref 4.8–10.8)
WBC # BLD: 9.96 K/UL — SIGNIFICANT CHANGE UP (ref 4.8–10.8)
WBC # FLD AUTO: 10.74 K/UL — SIGNIFICANT CHANGE UP (ref 4.8–10.8)
WBC # FLD AUTO: 9.96 K/UL — SIGNIFICANT CHANGE UP (ref 4.8–10.8)
WBC UR QL: 3 /HPF — SIGNIFICANT CHANGE UP (ref 0–5)

## 2020-06-20 PROCEDURE — 93010 ELECTROCARDIOGRAM REPORT: CPT | Mod: 76

## 2020-06-20 PROCEDURE — 71045 X-RAY EXAM CHEST 1 VIEW: CPT | Mod: 26

## 2020-06-20 PROCEDURE — 99285 EMERGENCY DEPT VISIT HI MDM: CPT

## 2020-06-20 RX ORDER — ACETAMINOPHEN 500 MG
650 TABLET ORAL EVERY 6 HOURS
Refills: 0 | Status: DISCONTINUED | OUTPATIENT
Start: 2020-06-20 | End: 2020-06-23

## 2020-06-20 RX ORDER — POTASSIUM CHLORIDE 20 MEQ
20 PACKET (EA) ORAL ONCE
Refills: 0 | Status: DISCONTINUED | OUTPATIENT
Start: 2020-06-20 | End: 2020-06-20

## 2020-06-20 RX ORDER — FOLIC ACID 0.8 MG
1 TABLET ORAL DAILY
Refills: 0 | Status: DISCONTINUED | OUTPATIENT
Start: 2020-06-20 | End: 2020-06-23

## 2020-06-20 RX ORDER — APIXABAN 2.5 MG/1
2.5 TABLET, FILM COATED ORAL EVERY 12 HOURS
Refills: 0 | Status: DISCONTINUED | OUTPATIENT
Start: 2020-06-20 | End: 2020-06-23

## 2020-06-20 RX ORDER — POTASSIUM CHLORIDE 20 MEQ
10 PACKET (EA) ORAL ONCE
Refills: 0 | Status: DISCONTINUED | OUTPATIENT
Start: 2020-06-20 | End: 2020-06-20

## 2020-06-20 RX ORDER — LATANOPROST 0.05 MG/ML
1 SOLUTION/ DROPS OPHTHALMIC; TOPICAL AT BEDTIME
Refills: 0 | Status: DISCONTINUED | OUTPATIENT
Start: 2020-06-20 | End: 2020-06-23

## 2020-06-20 RX ORDER — ASPIRIN/CALCIUM CARB/MAGNESIUM 324 MG
81 TABLET ORAL DAILY
Refills: 0 | Status: DISCONTINUED | OUTPATIENT
Start: 2020-06-20 | End: 2020-06-23

## 2020-06-20 RX ORDER — ATORVASTATIN CALCIUM 80 MG/1
40 TABLET, FILM COATED ORAL DAILY
Refills: 0 | Status: DISCONTINUED | OUTPATIENT
Start: 2020-06-20 | End: 2020-06-23

## 2020-06-20 RX ORDER — CALCIUM ACETATE 667 MG
667 TABLET ORAL DAILY
Refills: 0 | Status: DISCONTINUED | OUTPATIENT
Start: 2020-06-20 | End: 2020-06-23

## 2020-06-20 RX ORDER — FUROSEMIDE 40 MG
20 TABLET ORAL
Refills: 0 | Status: DISCONTINUED | OUTPATIENT
Start: 2020-06-20 | End: 2020-06-20

## 2020-06-20 RX ORDER — FUROSEMIDE 40 MG
20 TABLET ORAL DAILY
Refills: 0 | Status: DISCONTINUED | OUTPATIENT
Start: 2020-06-20 | End: 2020-06-23

## 2020-06-20 RX ORDER — CHLORHEXIDINE GLUCONATE 213 G/1000ML
1 SOLUTION TOPICAL
Refills: 0 | Status: DISCONTINUED | OUTPATIENT
Start: 2020-06-20 | End: 2020-06-23

## 2020-06-20 RX ORDER — TIMOLOL 0.5 %
1 DROPS OPHTHALMIC (EYE)
Refills: 0 | Status: DISCONTINUED | OUTPATIENT
Start: 2020-06-20 | End: 2020-06-23

## 2020-06-20 RX ORDER — SERTRALINE 25 MG/1
50 TABLET, FILM COATED ORAL DAILY
Refills: 0 | Status: DISCONTINUED | OUTPATIENT
Start: 2020-06-20 | End: 2020-06-23

## 2020-06-20 RX ORDER — PANTOPRAZOLE SODIUM 20 MG/1
40 TABLET, DELAYED RELEASE ORAL
Refills: 0 | Status: DISCONTINUED | OUTPATIENT
Start: 2020-06-20 | End: 2020-06-23

## 2020-06-20 RX ORDER — ATORVASTATIN CALCIUM 80 MG/1
1 TABLET, FILM COATED ORAL
Qty: 0 | Refills: 0 | DISCHARGE

## 2020-06-20 RX ORDER — NITROGLYCERIN 6.5 MG
0.4 CAPSULE, EXTENDED RELEASE ORAL
Refills: 0 | Status: COMPLETED | OUTPATIENT
Start: 2020-06-20 | End: 2020-06-21

## 2020-06-20 RX ORDER — METOPROLOL TARTRATE 50 MG
25 TABLET ORAL DAILY
Refills: 0 | Status: DISCONTINUED | OUTPATIENT
Start: 2020-06-20 | End: 2020-06-22

## 2020-06-20 RX ORDER — POTASSIUM CHLORIDE 20 MEQ
20 PACKET (EA) ORAL
Refills: 0 | Status: COMPLETED | OUTPATIENT
Start: 2020-06-20 | End: 2020-06-20

## 2020-06-20 RX ADMIN — Medication 20 MILLIEQUIVALENT(S): at 22:48

## 2020-06-20 RX ADMIN — Medication 20 MILLIEQUIVALENT(S): at 19:41

## 2020-06-20 RX ADMIN — Medication 650 MILLIGRAM(S): at 22:47

## 2020-06-20 RX ADMIN — Medication 20 MILLIGRAM(S): at 19:41

## 2020-06-20 RX ADMIN — Medication 0.4 MILLIGRAM(S): at 22:47

## 2020-06-20 NOTE — ED PROVIDER NOTE - OBJECTIVE STATEMENT
83 yo female PM multiple medical problems including RA, DVT/PE on Eliquis, diastolic heart failure, diverticulitis, CKD, HTN, gout, pacemaker c/o an episode fo SOB at 3 AM, resolved spontaneously after several minutes.  Upon further questioning patient admitted the SOB was similar to prior episodes and was not associated with any CP, dizziness or lightheadedness, no abdominal pain, focal weakness . paresthesias or any other acute complaints. She reports unchanged appetite, no change in urine output, no diarrhea, black or bloody stools.  She was admitted to the hospital in February for CP/ SOB, has non-ischemic cardiomyopathy.  She is concerned about her renal function and that prompted her visit to ED today.  At this time she denies any complaints.  her ECG is unchanged from prio.  Will check labs, UA, get CXR, reassess.

## 2020-06-20 NOTE — ED PROVIDER NOTE - NS ED ROS FT
Constitutional: (-) fever, (-)chills, (-) weight loss  Eyes/ENT: (-) blurry vision, (-) epistaxis, (-) sore throat  Cardiovascular: (-) chest pain, (-) syncope, (-) dizziness or change in exercise tolerance  Respiratory: (-) cough, (+) transient shortness of breath, (-)hemoptysis  Gastrointestinal: (-)nausea or  vomiting, (-) diarrhea, (-) abdominal pain  :(+) mild dysuria, (-) hematuria or frequency, (-) flank pain  Musculoskeletal: (-) neck pain, (-) new back pain, (-) new joint pain  Integumentary: (-) rash, (-) edema  Neurological: (-) headache, (-) altered mental status, (-) focal weakness or paresthesias  Psychiatric: (-) hallucinations  Allergic/Immunologic: (-) pruritus

## 2020-06-20 NOTE — H&P ADULT - ATTENDING COMMENTS
I saw and evaluated the patient. I have reviewed and agree with the findings and plan of care as documented above in the resident’s note (unless indicated differently below). Any necessary changes were made in the body of the text.    83 yo F pt w/ a relevant history of HFpEF (LVEF of 53% in 02/2020), hx of PPM placement, CKD 4, DVT/PE (on apixaban) and RA. P/w atypical chest pain. Onset: on the night before presentation. Location: left sided below the left breast. Quality: sharp. Radiation: none. Intensity: 5/10. Frequency: intermittent episodes w/ waxing and waning. Alleviating factors: none (says that has had chest pain in the past that improved w/ nitroglycerin). Aggravating factors: none (no association w/ exertion or food intake). Associated w/ dizziness (chronic as per the patient) and generalized weakness. Has no other concerns/complaints. At baseline, ambulates w/ a cane/walker. Lives at home with her children.    ROS:  Constitutional: no fevers; no chills  Eyes: no conjunctivitis; no itching  ENT: no dysphagia; no odynophagia  CVS: no PND; no orthopnea; +chest pain  Resp: +SOB (with episodes of chest pain); no coughing  GI: no nausea; no vomiting; no diarrhea; no abd pain  : no dysuria; no hematuria  MSK: no myalgias; no arthralgias  Skin: no rashes; no ulcers  Neuro: no focal weakness; no headache; +intermittent dizziness (as in HPI)  All other systems reviewed and are negative    PMHx, home medications, SurHx, FHx and Social history as above in the corresponding sections of the note - reviewed and edited where appropriate    Exam:  Vitals: BP = 167/77; P = 83; T = 99.9; RR = 18; SpO2 > 95 on room air  General: appears stated age; cooperative; chronically ill appearing; anxious due to discomfort  Eyes: anicteric sclera; moist conjunctiva w/ no lid lag; PERRL; EOMI  HENT: NC/AT; clear oropharynx w/ moist mucous membranes; nL hard/soft palate  Neck: supple w/ FROM; trachea midline; no thyromegaly; no carotid bruits  Lungs: cta b/l with no tachypnea, accessory muscle use, wheezing, rhonci or rales  CVS: RRR; S1 and S2 w/o MRGs  Abd: BS+; soft; non-tender to palpation x 4; no masses or HSM  Ext: no peripheral edema; pulses 2+ b/l  Skin: normal temp, turgor and texture; no rashes, ulcers or nodules  Neuro: CN II-XII intact; str 5/5 throughout; sensation grossly intact – light touch/temp  Psych: appropriate affect; alert and oriented to person, place, time and situation    Labs significant for WBC 10.74, H&H 10.6/34.4, BUN/Cr 35/2.2, gluc 110, trop 0.04 x 3  U/A w/ 100 mg/dL of protein otherwise unremarkable  CXR: no acute cardiopulmonary disease   ECHO (02/2020): LVEF of 53%, mild mitral annular calcification, mild posterior MV leaflet calcification, mild to mod MV regurg, mod TR  EKG: ventricular paced rhythm - compared to prior EKG's including ones from 2018 - without significant interval change    Assessment:  (1) Atypical chest pain - ACS less likely given stable cardiac enzymes x 3  (2) CHF w/ preserved EF (has BiV-ICD) - clinically not in acute exacerbation  --- Hx of non-ischemic cardiomyopathy - last cath in 2014  (3) CKD 4 - renal function appears stable  (4) Fever in the ED to 102.4 deg F - unclear etiology  --- Pt w/ no respiratory symptoms and CXR unremarkable  --- U/A unrevealing  --- No diarrhea  --- No skin changes / rashes  (5) Normocytic anemia of chronic kidney disease - stable w/ no bleeding  (6) Mild hypokalemia - since repleted  (7) Hx of DVT/PE (on apixaban) - stable c/w treatment  (8) HTN / HLD - on tx and stable  (9) Glaucoma - stable and on tx w/ no acute complaints  (10) Gout/RA - stable and w/ no acute complaints    Plan:  (1) Telemetry monitoring  (2) Trend cardiac enzymes and repeat EKG in AM  (3) ICD interrogation  (4) V/Q scan  (5) Cardiology on board (recommendations appreciated)  (6) Monitor intake and output; daily weights  (7) C/w furosemide as dosed (pt appears euvolemic)  (8) Trend BUN/Cr  (9) F/u blood cultures, procalcitonin/CRP level and covid-19 PCR  (10) Monitoring off abx for now - no obvious etiology  (11) Trend H&H  (12) C/w meds as dosed  (13) Supportive care: PRN analgesics, anti-emetics, anti-pyretics, anti-tussives    Code status: full code I saw and evaluated the patient. I have reviewed and agree with the findings and plan of care as documented above in the resident’s note (unless indicated differently below). Any necessary changes were made in the body of the text.    83 yo F pt w/ a relevant history of HFpEF (LVEF of 53% in 02/2020), hx of PPM placement, CKD 4, DVT/PE (on apixaban) and RA. P/w atypical chest pain. Onset: on the night before presentation. Location: left sided below the left breast. Quality: sharp. Radiation: none. Intensity: 5/10. Frequency: intermittent episodes w/ waxing and waning. Alleviating factors: none (says that has had chest pain in the past that improved w/ nitroglycerin). Aggravating factors: none (no association w/ exertion or food intake). Associated w/ dizziness (chronic as per the patient) and generalized weakness. Has no other concerns/complaints. At baseline, ambulates w/ a cane/walker. Lives at home with her children.    ROS:  Constitutional: no fevers; no chills  Eyes: no conjunctivitis; no itching  ENT: no dysphagia; no odynophagia  CVS: no PND; no orthopnea; +chest pain  Resp: +SOB (with episodes of chest pain); no coughing  GI: no nausea; no vomiting; no diarrhea; no abd pain  : no dysuria; no hematuria  MSK: no myalgias; no arthralgias  Skin: no rashes; no ulcers  Neuro: no focal weakness; no headache; +intermittent dizziness (as in HPI)  All other systems reviewed and are negative    PMHx, home medications, SurHx, FHx and Social history as above in the corresponding sections of the note - reviewed and edited where appropriate    Exam:  Vitals: BP = 167/77; P = 83; T = 99.9; RR = 18; SpO2 > 95 on room air  General: appears stated age; cooperative; chronically ill appearing; anxious due to discomfort  Eyes: anicteric sclera; moist conjunctiva w/ no lid lag; PERRL; EOMI  HENT: NC/AT; clear oropharynx w/ moist mucous membranes; nL hard/soft palate  Neck: supple w/ FROM; trachea midline; no thyromegaly; no carotid bruits  Lungs: cta b/l with no tachypnea, accessory muscle use, wheezing, rhonci or rales  CVS: RRR; S1 and S2 w/o MRGs  Abd: BS+; soft; non-tender to palpation x 4; no masses or HSM  Ext: no peripheral edema; pulses 2+ b/l  Skin: normal temp, turgor and texture; no rashes, ulcers or nodules  Neuro: CN II-XII intact; str 5/5 throughout; sensation grossly intact – light touch/temp  Psych: appropriate affect; alert and oriented to person, place, time and situation    Labs significant for WBC 10.74, H&H 10.6/34.4, BUN/Cr 35/2.2, gluc 110, trop 0.04 x 3  U/A w/ 100 mg/dL of protein otherwise unremarkable  CXR: no acute cardiopulmonary disease   ECHO (02/2020): LVEF of 53%, mild mitral annular calcification, mild posterior MV leaflet calcification, mild to mod MV regurg, mod TR  EKG: ventricular paced rhythm - compared to prior EKG's including ones from 2018 - without significant interval change    Assessment:  (1) Atypical chest pain - ACS less likely given stable cardiac enzymes x 3  (2) CHF w/ preserved EF (has BiV-ICD) - clinically not in acute exacerbation  --- Hx of non-ischemic cardiomyopathy - last cath in 2014  (3) CKD 4 - renal function appears stable  (4) Fever in the ED to 102.4 deg F - unclear etiology  --- Pt w/ no respiratory symptoms and CXR unremarkable  --- U/A unrevealing  --- No diarrhea  --- No skin changes / rashes  (5) Normocytic anemia of chronic kidney disease - stable w/ no bleeding  (6) Mild hypokalemia - since repleted  (7) Hx of DVT/PE (on apixaban) - stable c/w treatment  (8) HTN / HLD - on tx and stable  (9) Glaucoma - stable and on tx w/ no acute complaints  (10) Gout/RA - stable and w/ no acute complaints    Plan:  (1) Telemetry monitoring  (2) Trend cardiac enzymes and repeat EKG in AM  (3) ICD interrogation  (4) D-dimer > V/Q scan  (5) Cardiology on board (recommendations appreciated)  (6) Monitor intake and output; daily weights  (7) C/w furosemide as dosed (pt appears euvolemic)  (8) Trend BUN/Cr  (9) F/u blood cultures, procalcitonin/CRP level and covid-19 PCR  (10) Monitoring off abx for now - no obvious etiology  (11) Trend H&H  (12) C/w meds as dosed  (13) Supportive care: PRN analgesics, anti-emetics, anti-pyretics, anti-tussives    Code status: full code I saw and evaluated the patient. I have reviewed and agree with the findings and plan of care as documented above in the resident’s note (unless indicated differently below). Any necessary changes were made in the body of the text.    85 yo F pt w/ a relevant history of HFpEF (LVEF of 53% in 02/2020), hx of PPM placement, CKD 4, DVT/PE (on apixaban) and RA. P/w atypical chest pain. Onset: on the night before presentation. Location: left sided below the left breast. Quality: sharp. Radiation: none. Intensity: 5/10. Frequency: intermittent episodes w/ waxing and waning. Alleviating factors: none (says that has had chest pain in the past that improved w/ nitroglycerin). Aggravating factors: none (no association w/ exertion or food intake). Associated w/ dizziness (chronic as per the patient) and generalized weakness. Has no other concerns/complaints. At baseline, ambulates w/ a cane/walker. Lives at home with her children.    ROS:  Constitutional: no fevers; no chills  Eyes: no conjunctivitis; no itching  ENT: no dysphagia; no odynophagia  CVS: no PND; no orthopnea; +chest pain  Resp: +SOB (with episodes of chest pain); no coughing  GI: no nausea; no vomiting; no diarrhea; no abd pain  : no dysuria; no hematuria  MSK: no myalgias; no arthralgias  Skin: no rashes; no ulcers  Neuro: no focal weakness; no headache; +intermittent dizziness (as in HPI)  All other systems reviewed and are negative    PMHx, home medications, SurHx, FHx and Social history as above in the corresponding sections of the note - reviewed and edited where appropriate    Exam:  Vitals: BP = 167/77; P = 83; T = 99.9; RR = 18; SpO2 > 95 on room air  General: appears stated age; cooperative; chronically ill appearing; anxious due to discomfort  Eyes: anicteric sclera; moist conjunctiva w/ no lid lag; PERRL; EOMI  HENT: NC/AT; clear oropharynx w/ moist mucous membranes; nL hard/soft palate  Neck: supple w/ FROM; trachea midline; no thyromegaly; no carotid bruits  Lungs: cta b/l with no tachypnea, accessory muscle use, wheezing, rhonci or rales  CVS: RRR; S1 and S2 w/o MRGs  Abd: BS+; soft; non-tender to palpation x 4; no masses or HSM  Ext: no peripheral edema; pulses 2+ b/l  Skin: normal temp, turgor and texture; no rashes, ulcers or nodules  Neuro: CN II-XII intact; str 5/5 throughout; sensation grossly intact – light touch/temp  Psych: appropriate affect; alert and oriented to person, place, time and situation    Labs significant for WBC 10.74, H&H 10.6/34.4, BUN/Cr 35/2.2, gluc 110, trop 0.04 x 3  U/A w/ 100 mg/dL of protein otherwise unremarkable  CXR: no acute cardiopulmonary disease   ECHO (02/2020): LVEF of 53%, mild mitral annular calcification, mild posterior MV leaflet calcification, mild to mod MV regurg, mod TR  EKG: ventricular paced rhythm - compared to prior EKG's including ones from 2018 - without significant interval change    Assessment:  (1) Atypical chest pain - ACS less likely given stable cardiac enzymes x 3  (2) CHF w/ preserved EF (has BiV-ICD) - clinically not in acute exacerbation  --- Hx of non-ischemic cardiomyopathy - last cath in 2014  (3) CKD 4 - renal function appears stable  (4) Fever in the ED to 102.4 deg F - unclear etiology  --- Pt w/ no respiratory symptoms and CXR unremarkable  --- U/A unrevealing  --- No diarrhea  --- No skin changes / rashes  (5) Normocytic anemia of chronic kidney disease - stable w/ no bleeding  (6) Mild hypokalemia - since repleted  (7) Hx of DVT/PE (on apixaban) - stable c/w treatment  (8) HTN / HLD - on tx and stable  (9) Glaucoma - stable and on tx w/ no acute complaints  (10) Gout/RA - stable and w/ no acute complaints    Plan:  (1) Telemetry monitoring  (2) Trend cardiac enzymes and repeat EKG in AM  --- Would defer repeating ECHO for now (last echo was in 02/2020)  (3) ICD interrogation - ?EP eval  (4) D-dimer (if positive) > V/Q scan  (5) Cardiology on board (recommendations appreciated)  (6) Monitor intake and output; daily weights  (7) C/w furosemide as dosed (pt appears euvolemic clinically)  (8) Trend BUN/Cr  (9) F/u blood cultures, procalcitonin/CRP level and covid-19 PCR  (10) Monitoring off abx for now - no obvious etiology  (11) Trend H&H  (12) C/w meds as dosed  (13) Supportive care: PRN analgesics, anti-emetics, anti-pyretics, anti-tussives    Code status: full code

## 2020-06-20 NOTE — H&P ADULT - NSHPOUTPATIENTPROVIDERS_GEN_ALL_CORE
Dr. Sloan (PMD)  Dr. Urrutia (Nephrologist)  Dr. Mckinney (Cardiologist)  Dr. Husain (Ophthalmologist)

## 2020-06-20 NOTE — H&P ADULT - ASSESSMENT
The patient is an 84 year-old female with a PMH of DVT/PE (on Eliquis), HTN, early glaucoma, DLD, HFpEF (last EF 53% 2/2020), s/p PPM, CKD III/IV, gout, multiple caths (neg), stress test (neg per patient), rheumatoid arthritis, presenting for chest pressure and shortness of breath, admitted for CHF exacerbation and troponinemia.        # Intermittent shortness of breath, chest pressure, likely secondary to HFpEF exacerbation  S/p multiple cardiac caths, stress test in past, negative  Trops 0.04 on presentation, slightly increased from baseline 0.02-0.03; 98% O2 sat on rm air  CXR did not reveal evidence of pleural effusion, + stable cardiomegaly  F/u repeat STAT trops (drawn, pending), 8 PM and AM trops  F/u STAT EKG, 2D echo  Strict intake/output, fluid restrictions  F/u AM pro-BNP, BMP, Mg, CBC  Increase Lasix to 20 mg PO once daily (patient had been taking once M, T, R, S)  Cardio c/s  Admit to Telemetry    # Anemia, likely chronic secondary to CKD  Trend CBCs, transfuse Hb < 8  Vitals per routine    # HTN, stable  /74 on this admission  C/w metoprolol succinate 25 mg PO once daily  Vitals per routine    # Mild hypokalemia  Patient reports typically hyperkalemic, though not documented in our EMR  Give KCl PO 20 mEq x 2 doses  F/u AM BMP    # H/o DVT, PE, stable  C/w Eliquis 2.5 mg PO BID  Vitals per routine    # CKD, at baseline  C/w calcium acetate 1 tab once daily  Renally restricted diet    # DLD  C/w atorvastatin 40 mg PO once daily    # Glaucoma, stable  C/w timolol, latanoprost, Systane gtt as ordered    # CHG  # DVT ppx- c/w Eliquis 2.5 mg BID  # GI ppx- pantoprazole  # Diet- DASH w/ renal and fluid restrictions  # Activity- out of bed to chair  # Dispo- acute; admitted for presumed HFpEF exacerbation

## 2020-06-20 NOTE — ED PROVIDER NOTE - PHYSICAL EXAMINATION
Vital Signs: I have reviewed the initial vital signs.  Constitutional: well-nourished, appears stated age, no acute distress  HEENT: PERRL, pink conjunctivae, mmm  Cardiovascular: regular rate, regular rhythm, well-perfused extremities, distal pulses intact  Respiratory: unlabored respiratory effort, clear to auscultation bilaterally, speaking full sentences  Gastrointestinal: soft, non-tender abdomen, no pulsatile mass, no CVA or midline spine ttp  Musculoskeletal: supple neck, no lower extremity edema or calf ttp. + joint stigmata of RA  Integumentary: warm, dry, no rash  Neurologic: awake, alert, cranial nerves II-XII grossly intact, extremities’ motor and sensory functions grossly intact  Psychiatric: appropriate mood, appropriate affect

## 2020-06-20 NOTE — ED ADULT NURSE NOTE - PMH
Chronic kidney disease    Diastolic heart failure    Diverticulitis  sigmoid  DVT, lower extremity    Gout    Hypertension    Pacemaker    Rheumatoid arthritis

## 2020-06-20 NOTE — H&P ADULT - NSHPSOCIALHISTORY_GEN_ALL_CORE
Patient denies alcohol, tobacco, illicit drug use including cocaine, marijuana, amphetamines, opiates.

## 2020-06-20 NOTE — H&P ADULT - NSHPPHYSICALEXAM_GEN_ALL_CORE
Vital Signs Last 24 Hrs  T(C): 37.1 (20 Jun 2020 15:47), Max: 37.3 (20 Jun 2020 11:34)  T(F): 98.8 (20 Jun 2020 15:47), Max: 99.1 (20 Jun 2020 11:34)  HR: 79 (20 Jun 2020 15:47) (79 - 83)  BP: 122/74 (20 Jun 2020 15:47) (122/74 - 140/67)  BP(mean): --  RR: 18 (20 Jun 2020 15:47) (18 - 18)  SpO2: 97% (20 Jun 2020 15:47) (97% - 98%)    Physical examination:  General: slight distress due to dyspnea, AAOx3, normal body habitus  HEENT: atraumatic, normocephalic  CV: NS1, S2, RRR, no murmurs, rubs, gallops  Pulm: clear to auscultation b/l; non-tachypneic; on rm air; non-cyanotic  Gastrointestinal: soft, nontender, nondistended; + bowel sounds in all quadrants  Extremities: non-edematous; non-erythematous  Neurological: non-focal; non-dysarthric

## 2020-06-20 NOTE — H&P ADULT - HISTORY OF PRESENT ILLNESS
The patient is an 84 year-old female with a PMH of DVT/PE (on Eliquis), HTN, early glaucoma, DLD, HFpEF (last EF 53% 2/2020), s/p PPM, CKD III/IV, gout, multiple caths (neg), stress test (neg per patient), rheumatoid arthritis, presenting for chest pressure and shortness of breath. The patient reports last night she began experiencing chest pressure on the left side of her chest, initially radiating to the right side of her chest but which is now nonradiating, occurring intermittently 5 mins on/off. She reports the episodes have been continual since last night, not related to exertion, and have been associated with heaviness of breathing which occurs with each episode. Denies lightheadedness but reports chronic dizziness which she attributes to calcium acetate started in lieu of calcitriol (patient does not know why it was switched); denies abdominal pain, nausea, vomiting, diarrhea, blood in the stool or black stool, significant unintended weight loss. She also reports 2 weeks of burning on urination for which she was given an antibiotic with no relief. She lives at home with 3 of her children, all of whom are healthy; she ambulates w/ a cane at baseline, sometimes w/ a walker.    In the ED, T 99.1 F, HR 83, /67; trops 0.04; CXR revealed cardiomegaly; Hb 8.8; UA negative.

## 2020-06-20 NOTE — ED ADULT NURSE NOTE - OBJECTIVE STATEMENT
Pt presents to ED c/o chest pain & SOB that began this morning and lasted about 10 minutes. Pt reports the pain and SOB has subsided. Pt also c/o lower abdominal pain for several weeks. Denies fevers, headache, difficulty breathing.

## 2020-06-20 NOTE — H&P ADULT - NSHPLABSRESULTS_GEN_ALL_CORE
8.8    9.96  )-----------( 176      ( 20 Jun 2020 12:25 )             28.6   06-20    144  |  105  |  36<H>  ----------------------------<  145<H>  3.4<L>   |  27  |  2.2<H>    Ca    8.6      20 Jun 2020 12:25    Troponin T, Serum: 0.04: TYPE:(C=Critical, N=Notification, A=Abnormal) c  TESTS: Trop  DATE/TIME CALLED: 06/20/20 13:21  CALLED TO: Dr. Albarado  READ BACK (2 Patient Identifiers)(Y/N): y  READ BACK VALUES (Y/N): y  CALLED BY: ed ng/mL (06.20.20 @ 12:25)    Urinalysis (06.20.20 @ 14:11)    Glucose Qualitative, Urine: Negative    Blood, Urine: Trace    pH Urine: 6.0    Color: Light Yellow    Urine Appearance: Clear    Bilirubin: Negative    Ketone - Urine: Negative    Specific Gravity: 1.015    Protein, Urine: 100 mg/dL    Urobilinogen: <2 mg/dL    Nitrite: Negative    Leukocyte Esterase Concentration: Negative      < from: Xray Chest 1 View AP/PA (06.20.20 @ 14:13) >    INTERPRETATION:  Clinical History / Reason for exam: Chest pain 2/17/2020    Comparison : Chest radiograph None.    Technique/Positioning: Single image.    Findings:    Support devices: Pacemaker leads in place    Cardiac/mediastinum/hilum: Cardiomegaly unchanged    Lung parenchyma/Pleura: Within normal limits.    Skeleton/soft tissues: Unremarkable.    Impression:      No radiographic evidence of acute cardiopulmonarydisease.

## 2020-06-20 NOTE — CONSULT NOTE ADULT - ASSESSMENT
Assessment:  Chest pain a/w SOB  h/o non-ischemic cardiomyopathy EF 45-50%, last cath 2014 no significant CAD at the time  s/p BiV-ICD (Medtronic)  Mild troponin elevation  h/o DVT/PE on eliquis  CKD, stable    Plan:  telemetry  VQ scan?  c/w aspirin, eliquis, statin Assessment:  Atypical  h/o non-ischemic cardiomyopathy EF 45-50%, last cath 2014 no significant CAD at the time  s/p BiV-ICD (Medtronic)  Mild troponin elevation  h/o DVT/PE on eliquis, report compliance with meds  CKD, stable    Plan:  telemetry  trend cardiac enzymes  ICD interrogation  VQ scan to rule out PE  c/w aspirin, eliquis, statin Assessment:  Atypical chest pain  h/o non-ischemic cardiomyopathy EF 45-50%, last cath 2014 no significant CAD at the time  s/p BiV-ICD (Medtronic)  Mild troponin elevation  h/o DVT/PE on eliquis, report compliance with meds  CKD, stable    Plan:  telemetry  trend cardiac enzymes  ICD interrogation  VQ scan to rule out PE  c/w aspirin, eliquis, statin Assessment:  Atypical chest pain  h/o non-ischemic cardiomyopathy EF 45-50%, last cath 2014 no significant CAD at the time  s/p BiV-ICD (Medtronic)  Mild troponin elevation likely 2/2 CKD, stable  h/o DVT/PE on eliquis, report compliance with meds  CKD, stable    Plan:  telemetry  trend cardiac enzymes  ICD interrogation  recommend NM stress test  obtain 2D Echo  c/w aspirin, eliquis, statin Assessment:  Atypical chest pain  h/o non-ischemic cardiomyopathy EF 45-50%, last cath 2014 no significant CAD at the time  s/p BiV-ICD (Medtronic)  Mild troponin elevation likely 2/2 CKD, stable  h/o DVT/PE on eliquis, report compliance with meds  CKD, stable    Plan:  telemetry  trend cardiac enzymes  ICD interrogation  recommend NM stress test, if low risk - no further inpatient card w/up  obtain 2D Echo  c/w aspirin, eliquis, statin  recall prn

## 2020-06-21 LAB
ANION GAP SERPL CALC-SCNC: 13 MMOL/L — SIGNIFICANT CHANGE UP (ref 7–14)
BASOPHILS # BLD AUTO: 0.04 K/UL — SIGNIFICANT CHANGE UP (ref 0–0.2)
BASOPHILS NFR BLD AUTO: 0.4 % — SIGNIFICANT CHANGE UP (ref 0–1)
BUN SERPL-MCNC: 35 MG/DL — HIGH (ref 10–20)
CALCIUM SERPL-MCNC: 8.8 MG/DL — SIGNIFICANT CHANGE UP (ref 8.5–10.1)
CHLORIDE SERPL-SCNC: 105 MMOL/L — SIGNIFICANT CHANGE UP (ref 98–110)
CO2 SERPL-SCNC: 25 MMOL/L — SIGNIFICANT CHANGE UP (ref 17–32)
CREAT SERPL-MCNC: 2.1 MG/DL — HIGH (ref 0.7–1.5)
EOSINOPHIL # BLD AUTO: 0.17 K/UL — SIGNIFICANT CHANGE UP (ref 0–0.7)
EOSINOPHIL NFR BLD AUTO: 1.6 % — SIGNIFICANT CHANGE UP (ref 0–8)
GLUCOSE SERPL-MCNC: 100 MG/DL — HIGH (ref 70–99)
HCT VFR BLD CALC: 29.7 % — LOW (ref 37–47)
HGB BLD-MCNC: 9.4 G/DL — LOW (ref 12–16)
IMM GRANULOCYTES NFR BLD AUTO: 0.5 % — HIGH (ref 0.1–0.3)
LYMPHOCYTES # BLD AUTO: 2.26 K/UL — SIGNIFICANT CHANGE UP (ref 1.2–3.4)
LYMPHOCYTES # BLD AUTO: 21.2 % — SIGNIFICANT CHANGE UP (ref 20.5–51.1)
MAGNESIUM SERPL-MCNC: 1.6 MG/DL — LOW (ref 1.8–2.4)
MCHC RBC-ENTMCNC: 29.7 PG — SIGNIFICANT CHANGE UP (ref 27–31)
MCHC RBC-ENTMCNC: 31.6 G/DL — LOW (ref 32–37)
MCV RBC AUTO: 93.7 FL — SIGNIFICANT CHANGE UP (ref 81–99)
MONOCYTES # BLD AUTO: 1.25 K/UL — HIGH (ref 0.1–0.6)
MONOCYTES NFR BLD AUTO: 11.7 % — HIGH (ref 1.7–9.3)
NEUTROPHILS # BLD AUTO: 6.88 K/UL — HIGH (ref 1.4–6.5)
NEUTROPHILS NFR BLD AUTO: 64.6 % — SIGNIFICANT CHANGE UP (ref 42.2–75.2)
NRBC # BLD: 0 /100 WBCS — SIGNIFICANT CHANGE UP (ref 0–0)
NT-PROBNP SERPL-SCNC: HIGH PG/ML (ref 0–300)
PLATELET # BLD AUTO: 174 K/UL — SIGNIFICANT CHANGE UP (ref 130–400)
POTASSIUM SERPL-MCNC: 4.5 MMOL/L — SIGNIFICANT CHANGE UP (ref 3.5–5)
POTASSIUM SERPL-SCNC: 4.5 MMOL/L — SIGNIFICANT CHANGE UP (ref 3.5–5)
RBC # BLD: 3.17 M/UL — LOW (ref 4.2–5.4)
RBC # FLD: 14.2 % — SIGNIFICANT CHANGE UP (ref 11.5–14.5)
SARS-COV-2 RNA SPEC QL NAA+PROBE: SIGNIFICANT CHANGE UP
SODIUM SERPL-SCNC: 143 MMOL/L — SIGNIFICANT CHANGE UP (ref 135–146)
TROPONIN T SERPL-MCNC: 0.04 NG/ML — CRITICAL HIGH
WBC # BLD: 10.65 K/UL — SIGNIFICANT CHANGE UP (ref 4.8–10.8)
WBC # FLD AUTO: 10.65 K/UL — SIGNIFICANT CHANGE UP (ref 4.8–10.8)

## 2020-06-21 PROCEDURE — 93010 ELECTROCARDIOGRAM REPORT: CPT

## 2020-06-21 PROCEDURE — 93970 EXTREMITY STUDY: CPT | Mod: 26

## 2020-06-21 PROCEDURE — 99222 1ST HOSP IP/OBS MODERATE 55: CPT

## 2020-06-21 PROCEDURE — 93306 TTE W/DOPPLER COMPLETE: CPT | Mod: 26

## 2020-06-21 PROCEDURE — 99223 1ST HOSP IP/OBS HIGH 75: CPT

## 2020-06-21 PROCEDURE — 93010 ELECTROCARDIOGRAM REPORT: CPT | Mod: 77

## 2020-06-21 RX ORDER — ATORVASTATIN CALCIUM 80 MG/1
40 TABLET, FILM COATED ORAL AT BEDTIME
Refills: 0 | Status: DISCONTINUED | OUTPATIENT
Start: 2020-06-21 | End: 2020-06-23

## 2020-06-21 RX ORDER — TRAMADOL HYDROCHLORIDE 50 MG/1
50 TABLET ORAL EVERY 8 HOURS
Refills: 0 | Status: DISCONTINUED | OUTPATIENT
Start: 2020-06-21 | End: 2020-06-23

## 2020-06-21 RX ORDER — ADENOSINE 3 MG/ML
60 INJECTION INTRAVENOUS ONCE
Refills: 0 | Status: COMPLETED | OUTPATIENT
Start: 2020-06-21 | End: 2020-06-22

## 2020-06-21 RX ORDER — NITROGLYCERIN 6.5 MG
0.4 CAPSULE, EXTENDED RELEASE ORAL
Refills: 0 | Status: DISCONTINUED | OUTPATIENT
Start: 2020-06-21 | End: 2020-06-23

## 2020-06-21 RX ORDER — MAGNESIUM SULFATE 500 MG/ML
2 VIAL (ML) INJECTION ONCE
Refills: 0 | Status: COMPLETED | OUTPATIENT
Start: 2020-06-21 | End: 2020-06-21

## 2020-06-21 RX ADMIN — Medication 1 DROP(S): at 18:07

## 2020-06-21 RX ADMIN — Medication 25 MILLIGRAM(S): at 05:45

## 2020-06-21 RX ADMIN — Medication 0.4 MILLIGRAM(S): at 08:30

## 2020-06-21 RX ADMIN — APIXABAN 2.5 MILLIGRAM(S): 2.5 TABLET, FILM COATED ORAL at 18:06

## 2020-06-21 RX ADMIN — Medication 25 GRAM(S): at 10:39

## 2020-06-21 RX ADMIN — LATANOPROST 1 DROP(S): 0.05 SOLUTION/ DROPS OPHTHALMIC; TOPICAL at 22:48

## 2020-06-21 RX ADMIN — PANTOPRAZOLE SODIUM 40 MILLIGRAM(S): 20 TABLET, DELAYED RELEASE ORAL at 05:45

## 2020-06-21 RX ADMIN — Medication 40 MILLIGRAM(S): at 12:19

## 2020-06-21 RX ADMIN — APIXABAN 2.5 MILLIGRAM(S): 2.5 TABLET, FILM COATED ORAL at 05:45

## 2020-06-21 RX ADMIN — Medication 1 MILLIGRAM(S): at 11:24

## 2020-06-21 RX ADMIN — Medication 81 MILLIGRAM(S): at 11:24

## 2020-06-21 RX ADMIN — ATORVASTATIN CALCIUM 40 MILLIGRAM(S): 80 TABLET, FILM COATED ORAL at 10:38

## 2020-06-21 RX ADMIN — TRAMADOL HYDROCHLORIDE 50 MILLIGRAM(S): 50 TABLET ORAL at 18:09

## 2020-06-21 RX ADMIN — ATORVASTATIN CALCIUM 40 MILLIGRAM(S): 80 TABLET, FILM COATED ORAL at 22:49

## 2020-06-21 RX ADMIN — Medication 20 MILLIGRAM(S): at 05:45

## 2020-06-21 RX ADMIN — Medication 0.4 MILLIGRAM(S): at 08:35

## 2020-06-21 RX ADMIN — Medication 667 MILLIGRAM(S): at 11:24

## 2020-06-21 RX ADMIN — Medication 1 DROP(S): at 10:38

## 2020-06-21 RX ADMIN — Medication 1 DROP(S): at 18:09

## 2020-06-21 RX ADMIN — SERTRALINE 50 MILLIGRAM(S): 25 TABLET, FILM COATED ORAL at 11:24

## 2020-06-21 NOTE — CHART NOTE - NSCHARTNOTEFT_GEN_A_CORE
day hospitalist follow up   Vital Signs Last 24 Hrs  T(C): 37.1 (21 Jun 2020 08:41), Max: 39.1 (20 Jun 2020 21:59)  T(F): 98.8 (21 Jun 2020 08:41), Max: 102.4 (20 Jun 2020 21:59)  HR: 76 (21 Jun 2020 08:41) (76 - 87)  BP: 154/69 (21 Jun 2020 08:41) (122/74 - 177/87)  BP(mean): --  RR: 18 (21 Jun 2020 08:41) (18 - 18)  SpO2: 98% (21 Jun 2020 08:41) (96% - 100%)  reviewed chart , echo performed awaiting results, further if neg for acute pathology , nm stress test  per cardio  rec   ep interrogation

## 2020-06-21 NOTE — PROGRESS NOTE ADULT - ASSESSMENT
84 year-old female with a PMH of DVT/PE (on Eliquis), HTN, early glaucoma, DLD, HFpEF (last EF 53% 2/2020), s/p PPM, CKD III/IV, gout, multiple caths (neg), stress test (neg per patient), rheumatoid arthritis, presenting for chest pressure and shortness of breath, chest pain and troponinemia.    # Intermittent shortness of breath, associated with sharp chest pain r/o pericarditis vs musculoskeletal pain from possible RA flare  S/p multiple cardiac caths, stress test in past, negative  EKG showing atrial sensed ventricular paced rhythm  Trops 0.04-0.04-0.04-0.04, slightly increased from baseline 0.02-0.03; 98% O2 sat on rm air  CXR did not reveal evidence of pleural effusion, + stable cardiomegaly  F/u  2D echo  Cardio c/s appreciated, stress test to r/o ischemia (will r/o once CP resolves)  c/w aspirin, BB. Start statins. F/U Aic and lipid profile.   could be musculoskeletal pain from possible RA flare. Start prednisone 40mg qd for now.    # Fever Tmax 102 F  no localizing source, WBC wnl  UA negative, CXR negative, f/u blood cx  f/u duplex LE  f/u ESR, CRP  keep off ABX, started on prednisone for possible RA flare    # CHFpEF   c/w home dose lasix 20mg qd  f/u 2d echo    # Anemia, likely chronic secondary to CKD   Trend CBCs, transfuse Hb < 8  Vitals per routine    # HTN, stable  /74 on this admission  C/w metoprolol succinate 25 mg PO once daily  Vitals per routine    # Mild hypokalemia - resolved   F/u repeat BMP    # H/o DVT, PE, stable  C/w Eliquis 2.5 mg PO BID  Vitals per routine    # CKD, at baseline  C/w calcium acetate 1 tab once daily  Renally restricted diet    # DLD  C/w atorvastatin 40 mg PO once daily    # Glaucoma, stable  C/w timolol, latanoprost, Systane gtt as ordered    # CHG  # DVT ppx- c/w Eliquis 2.5 mg BID  # GI ppx- pantoprazole  # Diet- DASH w/ renal and fluid restrictions  # Activity- out of bed to chair  # Dispo- acute; admitted for presumed HFpEF exacerbation

## 2020-06-21 NOTE — PROGRESS NOTE ADULT - SUBJECTIVE AND OBJECTIVE BOX
RACHEL SUMMERS 84y Female  MRN#: [id number]   CODE STATUS:     SUBJECTIVE  Patient is a 84y old Female who presents with a chief complaint of shortness of breath, troponinemia (2020 19:47)  Currently admitted to medicine with the primary diagnosis of SOB (shortness of breath)  Hospital course has been complicated by episodes of chest pain.   Today is hospital day 1d, and this morning she is still complaining of chest pain sharp, substernal radiating under left breast, exacerbated by deep breaths and changes in body position. Pain did not improve with s/l nitro.   Pt spiked up fever overnight to 102 F.     OBJECTIVE  PAST MEDICAL & SURGICAL HISTORY  DVT, lower extremity  Rheumatoid arthritis  Diastolic heart failure  Diverticulitis: sigmoid  Gout  Hypertension  Pacemaker  Chronic kidney disease  History of hysterectomy  History of tonsillectomy  History of appendectomy  Artificial pacemaker    ALLERGIES:  Keflex (Unknown)    MEDICATIONS:  STANDING MEDICATIONS  apixaban 2.5 milliGRAM(s) Oral every 12 hours  aspirin  chewable 81 milliGRAM(s) Oral daily  atorvastatin 40 milliGRAM(s) Oral at bedtime  atorvastatin Oral Tab/Cap - Peds 40 milliGRAM(s) Oral daily  calcium acetate 667 milliGRAM(s) Oral daily  chlorhexidine 4% Liquid 1 Application(s) Topical <User Schedule>  folic acid 1 milliGRAM(s) Oral daily  furosemide    Tablet 20 milliGRAM(s) Oral daily  latanoprost 0.005% Ophthalmic Solution 1 Drop(s) Both EYES at bedtime  magnesium sulfate  IVPB 2 Gram(s) IV Intermittent once  metoprolol succinate ER 25 milliGRAM(s) Oral daily  pantoprazole    Tablet 40 milliGRAM(s) Oral before breakfast  polyvinyl alcohol 1.4%/povidone 0.6% Ophthalmic Solution - Peds 1 Drop(s) Both EYES daily  predniSONE   Tablet 40 milliGRAM(s) Oral daily  sertraline 50 milliGRAM(s) Oral daily  timolol 0.5% Solution 1 Drop(s) Both EYES two times a day    PRN MEDICATIONS  acetaminophen   Tablet .. 650 milliGRAM(s) Oral every 6 hours PRN  nitroglycerin     SubLingual 0.4 milliGRAM(s) SubLingual every 5 minutes PRN      VITAL SIGNS: Last 24 Hours  T(C): 37.1 (2020 08:41), Max: 39.1 (2020 21:59)  T(F): 98.8 (2020 08:41), Max: 102.4 (2020 21:59)  HR: 76 (2020 08:41) (76 - 87)  BP: 154/69 (2020 08:41) (122/74 - 177/87)  BP(mean): --  RR: 18 (2020 08:41) (18 - 18)  SpO2: 98% (2020 08:41) (96% - 100%)    LABS:                        9.4    10.65 )-----------( 174      ( 2020 06:02 )             29.7         143  |  105  |  35<H>  ----------------------------<  100<H>  4.5   |  25  |  2.1<H>    Ca    8.8      2020 06:02  Mg     1.6             Urinalysis Basic - ( 2020 14:11 )    Color: Light Yellow / Appearance: Clear / S.015 / pH: x  Gluc: x / Ketone: Negative  / Bili: Negative / Urobili: <2 mg/dL   Blood: x / Protein: 100 mg/dL / Nitrite: Negative   Leuk Esterase: Negative / RBC: 2 /HPF / WBC 3 /HPF   Sq Epi: x / Non Sq Epi: 3 /HPF / Bacteria: Negative        Troponin T, Serum: 0.04 ng/mL <HH> (20 @ 06:02)  Troponin T, Serum: 0.04 ng/mL <HH> (20 @ 20:46)  Troponin T, Serum: 0.04 ng/mL <HH> (20 @ 18:25)  Troponin T, Serum: 0.04 ng/mL <HH> (20 @ 12:25)      CARDIAC MARKERS ( 2020 06:02 )  x     / 0.04 ng/mL / x     / x     / x      CARDIAC MARKERS ( 2020 20:46 )  x     / 0.04 ng/mL / x     / x     / x      CARDIAC MARKERS ( 2020 18:25 )  x     / 0.04 ng/mL / x     / x     / x      CARDIAC MARKERS ( 2020 12:25 )  x     / 0.04 ng/mL / x     / x     / x          RADIOLOGY:  < from: Xray Chest 1 View AP/PA (20 @ 14:13) >  Impression:      No radiographic evidence of acute cardiopulmonarydisease.    < end of copied text >      PHYSICAL EXAM:    GENERAL: NAD, AAOx3  HEENT:  Atraumatic, Normocephalic. EOMI, PERRLA, conjunctiva and sclera clear, No JVD  PULMONARY: Clear to auscultation bilaterally; No wheeze  CARDIOVASCULAR: Regular rate and rhythm; No murmurs, rubs, or gallops. Reproducible chest pain sternum and under left breast.   GASTROINTESTINAL: Soft, Nontender, Nondistended; Bowel sounds present  MUSCULOSKELETAL:  2+ Peripheral Pulses, No clubbing, cyanosis, or edema  NEUROLOGY: non-focal  SKIN: No rashes or lesions

## 2020-06-22 ENCOUNTER — TRANSCRIPTION ENCOUNTER (OUTPATIENT)
Age: 85
End: 2020-06-22

## 2020-06-22 LAB
ALBUMIN SERPL ELPH-MCNC: 3 G/DL — LOW (ref 3.5–5.2)
ALP SERPL-CCNC: 119 U/L — HIGH (ref 30–115)
ALT FLD-CCNC: 24 U/L — SIGNIFICANT CHANGE UP (ref 0–41)
ANION GAP SERPL CALC-SCNC: 14 MMOL/L — SIGNIFICANT CHANGE UP (ref 7–14)
AST SERPL-CCNC: 47 U/L — HIGH (ref 0–41)
BASOPHILS # BLD AUTO: 0.04 K/UL — SIGNIFICANT CHANGE UP (ref 0–0.2)
BASOPHILS NFR BLD AUTO: 0.4 % — SIGNIFICANT CHANGE UP (ref 0–1)
BILIRUB SERPL-MCNC: 0.5 MG/DL — SIGNIFICANT CHANGE UP (ref 0.2–1.2)
BUN SERPL-MCNC: 37 MG/DL — HIGH (ref 10–20)
CALCIUM SERPL-MCNC: 8.2 MG/DL — LOW (ref 8.5–10.1)
CHLORIDE SERPL-SCNC: 105 MMOL/L — SIGNIFICANT CHANGE UP (ref 98–110)
CO2 SERPL-SCNC: 23 MMOL/L — SIGNIFICANT CHANGE UP (ref 17–32)
CREAT SERPL-MCNC: 2 MG/DL — HIGH (ref 0.7–1.5)
EOSINOPHIL # BLD AUTO: 0.31 K/UL — SIGNIFICANT CHANGE UP (ref 0–0.7)
EOSINOPHIL NFR BLD AUTO: 3.1 % — SIGNIFICANT CHANGE UP (ref 0–8)
GLUCOSE SERPL-MCNC: 84 MG/DL — SIGNIFICANT CHANGE UP (ref 70–99)
HCT VFR BLD CALC: 27.3 % — LOW (ref 37–47)
HGB BLD-MCNC: 8.4 G/DL — LOW (ref 12–16)
IMM GRANULOCYTES NFR BLD AUTO: 0.2 % — SIGNIFICANT CHANGE UP (ref 0.1–0.3)
IRON SATN MFR SERPL: 17 UG/DL — LOW (ref 35–150)
IRON SATN MFR SERPL: 9 % — LOW (ref 15–50)
LYMPHOCYTES # BLD AUTO: 1.64 K/UL — SIGNIFICANT CHANGE UP (ref 1.2–3.4)
LYMPHOCYTES # BLD AUTO: 16.4 % — LOW (ref 20.5–51.1)
MAGNESIUM SERPL-MCNC: 2.1 MG/DL — SIGNIFICANT CHANGE UP (ref 1.8–2.4)
MCHC RBC-ENTMCNC: 28.5 PG — SIGNIFICANT CHANGE UP (ref 27–31)
MCHC RBC-ENTMCNC: 30.8 G/DL — LOW (ref 32–37)
MCV RBC AUTO: 92.5 FL — SIGNIFICANT CHANGE UP (ref 81–99)
MONOCYTES # BLD AUTO: 1.09 K/UL — HIGH (ref 0.1–0.6)
MONOCYTES NFR BLD AUTO: 10.9 % — HIGH (ref 1.7–9.3)
NEUTROPHILS # BLD AUTO: 6.88 K/UL — HIGH (ref 1.4–6.5)
NEUTROPHILS NFR BLD AUTO: 69 % — SIGNIFICANT CHANGE UP (ref 42.2–75.2)
NRBC # BLD: 0 /100 WBCS — SIGNIFICANT CHANGE UP (ref 0–0)
PLATELET # BLD AUTO: 152 K/UL — SIGNIFICANT CHANGE UP (ref 130–400)
POTASSIUM SERPL-MCNC: 4 MMOL/L — SIGNIFICANT CHANGE UP (ref 3.5–5)
POTASSIUM SERPL-SCNC: 4 MMOL/L — SIGNIFICANT CHANGE UP (ref 3.5–5)
PROT SERPL-MCNC: 5.6 G/DL — LOW (ref 6–8)
RBC # BLD: 2.95 M/UL — LOW (ref 4.2–5.4)
RBC # FLD: 14.2 % — SIGNIFICANT CHANGE UP (ref 11.5–14.5)
SODIUM SERPL-SCNC: 142 MMOL/L — SIGNIFICANT CHANGE UP (ref 135–146)
TIBC SERPL-MCNC: 194 UG/DL — LOW (ref 220–430)
UIBC SERPL-MCNC: 177 UG/DL — SIGNIFICANT CHANGE UP (ref 110–370)
WBC # BLD: 9.98 K/UL — SIGNIFICANT CHANGE UP (ref 4.8–10.8)
WBC # FLD AUTO: 9.98 K/UL — SIGNIFICANT CHANGE UP (ref 4.8–10.8)

## 2020-06-22 PROCEDURE — 93018 CV STRESS TEST I&R ONLY: CPT

## 2020-06-22 PROCEDURE — 99233 SBSQ HOSP IP/OBS HIGH 50: CPT

## 2020-06-22 PROCEDURE — 78452 HT MUSCLE IMAGE SPECT MULT: CPT | Mod: 26

## 2020-06-22 PROCEDURE — 93016 CV STRESS TEST SUPVJ ONLY: CPT

## 2020-06-22 PROCEDURE — 93284 PRGRMG EVAL IMPLANTABLE DFB: CPT | Mod: 26

## 2020-06-22 RX ORDER — METOPROLOL TARTRATE 50 MG
50 TABLET ORAL DAILY
Refills: 0 | Status: DISCONTINUED | OUTPATIENT
Start: 2020-06-22 | End: 2020-06-23

## 2020-06-22 RX ORDER — SACUBITRIL AND VALSARTAN 24; 26 MG/1; MG/1
1 TABLET, FILM COATED ORAL
Refills: 0 | Status: DISCONTINUED | OUTPATIENT
Start: 2020-06-22 | End: 2020-06-23

## 2020-06-22 RX ORDER — SACUBITRIL AND VALSARTAN 24; 26 MG/1; MG/1
1 TABLET, FILM COATED ORAL ONCE
Refills: 0 | Status: COMPLETED | OUTPATIENT
Start: 2020-06-22 | End: 2020-06-22

## 2020-06-22 RX ADMIN — LATANOPROST 1 DROP(S): 0.05 SOLUTION/ DROPS OPHTHALMIC; TOPICAL at 21:21

## 2020-06-22 RX ADMIN — Medication 667 MILLIGRAM(S): at 11:34

## 2020-06-22 RX ADMIN — APIXABAN 2.5 MILLIGRAM(S): 2.5 TABLET, FILM COATED ORAL at 17:37

## 2020-06-22 RX ADMIN — Medication 20 MILLIGRAM(S): at 05:45

## 2020-06-22 RX ADMIN — Medication 25 MILLIGRAM(S): at 05:44

## 2020-06-22 RX ADMIN — Medication 40 MILLIGRAM(S): at 05:45

## 2020-06-22 RX ADMIN — Medication 1 DROP(S): at 15:56

## 2020-06-22 RX ADMIN — Medication 1 DROP(S): at 17:37

## 2020-06-22 RX ADMIN — Medication 1 MILLIGRAM(S): at 11:33

## 2020-06-22 RX ADMIN — ATORVASTATIN CALCIUM 40 MILLIGRAM(S): 80 TABLET, FILM COATED ORAL at 11:34

## 2020-06-22 RX ADMIN — SACUBITRIL AND VALSARTAN 1 TABLET(S): 24; 26 TABLET, FILM COATED ORAL at 15:55

## 2020-06-22 RX ADMIN — Medication 81 MILLIGRAM(S): at 11:34

## 2020-06-22 RX ADMIN — Medication 1 DROP(S): at 05:45

## 2020-06-22 RX ADMIN — PANTOPRAZOLE SODIUM 40 MILLIGRAM(S): 20 TABLET, DELAYED RELEASE ORAL at 06:05

## 2020-06-22 RX ADMIN — SERTRALINE 50 MILLIGRAM(S): 25 TABLET, FILM COATED ORAL at 11:34

## 2020-06-22 RX ADMIN — CHLORHEXIDINE GLUCONATE 1 APPLICATION(S): 213 SOLUTION TOPICAL at 05:46

## 2020-06-22 RX ADMIN — APIXABAN 2.5 MILLIGRAM(S): 2.5 TABLET, FILM COATED ORAL at 05:45

## 2020-06-22 RX ADMIN — ADENOSINE 600 MILLIGRAM(S): 3 INJECTION INTRAVENOUS at 10:04

## 2020-06-22 NOTE — PROGRESS NOTE ADULT - SUBJECTIVE AND OBJECTIVE BOX
pt seen and examined  stress mpi and 2d echo noted  med mx for CM for now as stress mpi - low risk, known to have non ischemic CM in the past  card f/up as outpt  EP f/up noted

## 2020-06-22 NOTE — OCCUPATIONAL THERAPY INITIAL EVALUATION ADULT - GENERAL OBSERVATIONS, REHAB EVAL
Pt. received ambulating from bathroom with PTSumi. Pt. agreeable to OT joining for IE. +IV lock +tele. Pt. left semi-meza in bed in NAD, +IV lock, +tele +2LO2 via NC replaced by PT.

## 2020-06-22 NOTE — PHYSICAL THERAPY INITIAL EVALUATION ADULT - GENERAL OBSERVATIONS, REHAB EVAL
Pt. encountered alert and NAD, semifowler in bed, (+)tele (+) O2 via NC 2L, (+)IV lock, agreeable to PT verónica.

## 2020-06-22 NOTE — PHYSICAL THERAPY INITIAL EVALUATION ADULT - PERTINENT HX OF CURRENT PROBLEM, REHAB EVAL
Pt. is an 85 yo female with PMHx of LE DVT, RA, HF, HTN, +pacemaker placement, CKD, who presented to the ED with c/o SOB.

## 2020-06-22 NOTE — PHYSICAL THERAPY INITIAL EVALUATION ADULT - SHORT TERM MEMORY, REHAB EVAL
Take prenatal vitamins as prescribed.  You must follow-up with your OBGYN.  Return to the emergency department for any worsening abdominal pain, cramping, fever or abdominal swelling. Return to the emergency department for any other concerning symptoms.  
intact

## 2020-06-22 NOTE — OCCUPATIONAL THERAPY INITIAL EVALUATION ADULT - PERTINENT HX OF CURRENT PROBLEM, REHAB EVAL
Pt. is an 83 yo female with PMHx of LE DVT, RA, HF, HTN, +pacemaker placement, CKD, who presented to the ED with c/o SOB.

## 2020-06-22 NOTE — PROGRESS NOTE ADULT - ASSESSMENT
The patient is an 84 year-old female with a PMH of DVT/PE (on Eliquis), HTN, early glaucoma, DLD, HFpEF (last EF 53% 2/2020), s/p PPM, CKD III/IV, gout, multiple caths (neg), stress test (neg per patient), rheumatoid arthritis, presenting for chest pressure and shortness of breath. She also reports 2 weeks of burning on urination for which she was given an antibiotic with no relief.    1.	CP  2.	Non ischemic CM  3.	H/O Ch. DVT/PE on Eliquis  4.	HTN / DL  5.	Ch. HFrEF  6.	CKD- IV   7.	Gout          PLAN:    · The patient is an 84 year-old female with a PMH of DVT/PE (on Eliquis), HTN, early glaucoma, DLD, HFpEF (last EF 53% 2/2020), s/p PPM, CKD III/IV, gout, multiple caths (neg), stress test (neg per patient), rheumatoid arthritis, presenting for chest pressure and shortness of breath. She also reports 2 weeks of burning on urination for which she was given an antibiotic with no relief.    1.	CP  2.	Non ischemic CM  3.	H/O Ch. DVT/PE on Eliquis  4.	HTN / DL  5.	Ch. HFrEF  6.	CKD- IV   7.	Gout  8.	Normocytic Anemia          PLAN:    ·	S/P stress test. Check the result  ·	ECHO showed EF is 20-25% and severe pulmonary HTN  ·	CE x 4 are negative.   ·	EP for device interrogation  ·	Check i's and o's and daily wt.  ·	Low salt diet and water restriction to 1.5 L/C  ·	Monitor renal function  ·	Low H/H. Iron studies  ·	Cont current meds.

## 2020-06-22 NOTE — CHART NOTE - NSCHARTNOTEFT_GEN_A_CORE
Electrophysiology    Pts device Medtronic BiV ICD was interrogated on 06-22-20  Device working properly  Leads amplitude adjusted  3 recent events AT 2sec, NSVT 1sec in May  Preliminary results d/w with housestaff  Awaiting review by attending  Full report to follow as progress note in the chart  Patient is recommended to follow up with Dr Baez in [facility] [place], [place] within 1 months of discharge

## 2020-06-22 NOTE — PHYSICAL THERAPY INITIAL EVALUATION ADULT - ADDITIONAL COMMENTS
Pt. lives in a 3-floor home with [place], +railing on one side. Pt. lives with daughter and 3 sons. +HHA 9-3 6 days/week. Pt. reports needing A with ADLs. Pt. was able to ambulate independently using Rollator.

## 2020-06-22 NOTE — OCCUPATIONAL THERAPY INITIAL EVALUATION ADULT - ADDITIONAL COMMENTS
Pt. lives in a 3-floor home with CORONA, +railing on one side. Pt. lives with daughter and 3 sons. +HHA 9-3 6 days/week. Pt. reports needing A with ADLs and functional transfers PTA and no longer performs IADLs. +bathtub +grab bar +shower chair +b/s commode +rollator

## 2020-06-22 NOTE — DISCHARGE NOTE NURSING/CASE MANAGEMENT/SOCIAL WORK - PATIENT PORTAL LINK FT
You can access the FollowMyHealth Patient Portal offered by [facility] by registering at the following website: [email or web address]. By joining [employer]’s FollowMyHealth portal, you will also be able to view your health information using other applications (apps) compatible with our system.

## 2020-06-22 NOTE — PROGRESS NOTE ADULT - SUBJECTIVE AND OBJECTIVE BOX
RACHEL SUMMERS  84y Female    CHIEF COMPLAINT:    Patient is a 84y old  Female who presents with a chief complaint of shortness of breath, troponinemia (21 Jun 2020 09:51)      INTERVAL HPI/OVERNIGHT EVENTS:    Patient seen and examined.    ROS: All other systems are negative.    Vital Signs:    T(F): 99.8 (06-22-20 @ 06:05), Max: 100.7 (06-21-20 @ 20:24)  HR: 73 (06-22-20 @ 06:05) (73 - 86)  BP: 152/74 (06-22-20 @ 06:05) (152/74 - 162/77)  RR: 18 (06-22-20 @ 06:05) (16 - 18)  SpO2: 97% (06-21-20 @ 21:15) (96% - 98%)  I&O's Summary    21 Jun 2020 07:01  -  22 Jun 2020 07:00  --------------------------------------------------------  IN: 0 mL / OUT: 200 mL / NET: -200 mL      Daily Height in cm: 160.02 (21 Jun 2020 17:06)    Daily   CAPILLARY BLOOD GLUCOSE          PHYSICAL EXAM:    GENERAL:  NAD  SKIN: No rashes or lesions  HENT: Atrumatic. Normocephalic. PERRL. Moist membranes.  NECK: Supple, No JVD. No lymphadenopathy.  PULMONARY: CTA B/L. No wheezing. No rales  CVS: Normal S1, S2. Rate and Rythm are regular. No murmurs.  ABDOMEN/GI: Soft, Nontender, Nondistended; BS present  EXTREMITIES: Peripheral pulses intact. No edema B/L LE.  NEUROLOGIC:  No motor or sensory deficit.  PSYCH: Alert & oriented x 3    Consultant(s) Notes Reviewed:  [x ] YES  [ ] NO  Care Discussed with Consultants/Other Providers [ x] YES  [ ] NO    EKG reviewed  Telemetry reviewed    LABS:                        8.4    9.98  )-----------( 152      ( 22 Jun 2020 05:39 )             27.3     06-22    142  |  105  |  37<H>  ----------------------------<  84  4.0   |  23  |  2.0<H>    Ca    8.2<L>      22 Jun 2020 05:39  Mg     2.1     06-22    TPro  5.6<L>  /  Alb  3.0<L>  /  TBili  0.5  /  DBili  x   /  AST  47<H>  /  ALT  24  /  AlkPhos  119<H>  06-22      Serum Pro-Brain Natriuretic Peptide: [id number] pg/mL (06-21-20 @ 06:02)    Trop 0.04, CKMB --, CK --, 06-21-20 @ 06:02  Trop 0.04, CKMB --, CK --, 06-20-20 @ 20:46  Trop 0.04, CKMB --, CK --, 06-20-20 @ 18:25  Trop 0.04, CKMB --, CK --, 06-20-20 @ 12:25        RADIOLOGY & ADDITIONAL TESTS:    < from: Transthoracic Echocardiogram (06.21.20 @ 11:33) >    Summary:   1. Left ventricular ejection fraction, by visual estimation, is 20 to 25%.   2. Severely decreased global left ventricular systolic function.   3. Spectral Doppler shows pseudonormal pattern of left ventricular myocardial filling (Grade II diastolic dysfunction).   4. Left atrium is mildly enlarged.   5. Mildly enlarged right atrium.   6. Mild mitral valve regurgitation.   7. Mild thickening of the anterior and posterior mitral valve leaflets.   8. Moderate tricuspid regurgitation.   9. Estimated pulmonary artery systolic pressure is 63.7 mmHg assuming a right atrial pressure of 8 mmHg, which is consistent with severe pulmonary hypertension.    < end of copied text >    Imaging or report Personally Reviewed:  [ ] YES  [ ] NO    Medications:  Standing  aDENosine Injectable (ADENOSCAN) 60 milliGRAM(s) IV Bolus once  apixaban 2.5 milliGRAM(s) Oral every 12 hours  aspirin  chewable 81 milliGRAM(s) Oral daily  atorvastatin 40 milliGRAM(s) Oral at bedtime  atorvastatin Oral Tab/Cap - Peds 40 milliGRAM(s) Oral daily  calcium acetate 667 milliGRAM(s) Oral daily  chlorhexidine 4% Liquid 1 Application(s) Topical <User Schedule>  folic acid 1 milliGRAM(s) Oral daily  furosemide    Tablet 20 milliGRAM(s) Oral daily  latanoprost 0.005% Ophthalmic Solution 1 Drop(s) Both EYES at bedtime  metoprolol succinate ER 25 milliGRAM(s) Oral daily  pantoprazole    Tablet 40 milliGRAM(s) Oral before breakfast  polyvinyl alcohol 1.4%/povidone 0.6% Ophthalmic Solution - Peds 1 Drop(s) Both EYES daily  predniSONE   Tablet 40 milliGRAM(s) Oral daily  sertraline 50 milliGRAM(s) Oral daily  timolol 0.5% Solution 1 Drop(s) Both EYES two times a day    PRN Meds  acetaminophen   Tablet .. 650 milliGRAM(s) Oral every 6 hours PRN  nitroglycerin     SubLingual 0.4 milliGRAM(s) SubLingual every 5 minutes PRN  traMADol 50 milliGRAM(s) Oral every 8 hours PRN      Case discussed with resident    Care discussed with pt/family RACHEL SUMMERS  84y Female    CHIEF COMPLAINT:    Patient is a 84y old  Female who presents with a chief complaint of shortness of breath, troponinemia (21 Jun 2020 09:51)      INTERVAL HPI/OVERNIGHT EVENTS:    Patient seen and examined. C/O intermittent cp and sob. No palpitations. S/P stress test    ROS: All other systems are negative.    Vital Signs:    T(F): 99.8 (06-22-20 @ 06:05), Max: 100.7 (06-21-20 @ 20:24)  HR: 73 (06-22-20 @ 06:05) (73 - 86)  BP: 152/74 (06-22-20 @ 06:05) (152/74 - 162/77)  RR: 18 (06-22-20 @ 06:05) (16 - 18)  SpO2: 97% (06-21-20 @ 21:15) (96% - 98%)  I&O's Summary    21 Jun 2020 07:01  -  22 Jun 2020 07:00  --------------------------------------------------------  IN: 0 mL / OUT: 200 mL / NET: -200 mL      Daily Height in cm: 160.02 (21 Jun 2020 17:06)    Daily   CAPILLARY BLOOD GLUCOSE          PHYSICAL EXAM:    GENERAL:  NAD  SKIN: No rashes or lesions  HENT: Atraumatic Normocephalic. PERRL. Moist membranes.  NECK: Supple, No JVD. No lymphadenopathy.  PULMONARY: CTA B/L. No wheezing. No rales  CVS: Normal S1, S2. Rate and Rhythm are regular. No murmurs.  ABDOMEN/GI: Soft, Nontender, Nondistended; BS present  EXTREMITIES: Peripheral pulses intact. No edema B/L LE.  NEUROLOGIC:  No motor or sensory deficit.  PSYCH: Alert & oriented x 3    Consultant(s) Notes Reviewed:  [x ] YES  [ ] NO  Care Discussed with Consultants/Other Providers [ x] YES  [ ] NO    EKG reviewed  Telemetry reviewed    LABS:                        8.4    9.98  )-----------( 152      ( 22 Jun 2020 05:39 )             27.3     06-22    142  |  105  |  37<H>  ----------------------------<  84   Creatinine Trend: 2.0<--, 2.1<--, 2.2<--, 2.2<--  4.0   |  23  |  2.0<H>    Ca    8.2<L>      22 Jun 2020 05:39  Mg     2.1     06-22    TPro  5.6<L>  /  Alb  3.0<L>  /  TBili  0.5  /  DBili  x   /  AST  47<H>  /  ALT  24  /  AlkPhos  119<H>  06-22      Serum Pro-Brain Natriuretic Peptide: 58114 pg/mL (06-21-20 @ 06:02)    Trop 0.04, CKMB --, CK --, 06-21-20 @ 06:02  Trop 0.04, CKMB --, CK --, 06-20-20 @ 20:46  Trop 0.04, CKMB --, CK --, 06-20-20 @ 18:25  Trop 0.04, CKMB --, CK --, 06-20-20 @ 12:25        RADIOLOGY & ADDITIONAL TESTS:    < from: Transthoracic Echocardiogram (06.21.20 @ 11:33) >    Summary:   1. Left ventricular ejection fraction, by visual estimation, is 20 to 25%.   2. Severely decreased global left ventricular systolic function.   3. Spectral Doppler shows pseudonormal pattern of left ventricular myocardial filling (Grade II diastolic dysfunction).   4. Left atrium is mildly enlarged.   5. Mildly enlarged right atrium.   6. Mild mitral valve regurgitation.   7. Mild thickening of the anterior and posterior mitral valve leaflets.   8. Moderate tricuspid regurgitation.   9. Estimated pulmonary artery systolic pressure is 63.7 mmHg assuming a right atrial pressure of 8 mmHg, which is consistent with severe pulmonary hypertension.    < end of copied text >    Imaging or report Personally Reviewed:  [ ] YES  [ ] NO    Medications:  Standing  aDENosine Injectable (ADENOSCAN) 60 milliGRAM(s) IV Bolus once  apixaban 2.5 milliGRAM(s) Oral every 12 hours  aspirin  chewable 81 milliGRAM(s) Oral daily  atorvastatin 40 milliGRAM(s) Oral at bedtime  atorvastatin Oral Tab/Cap - Peds 40 milliGRAM(s) Oral daily  calcium acetate 667 milliGRAM(s) Oral daily  chlorhexidine 4% Liquid 1 Application(s) Topical <User Schedule>  folic acid 1 milliGRAM(s) Oral daily  furosemide    Tablet 20 milliGRAM(s) Oral daily  latanoprost 0.005% Ophthalmic Solution 1 Drop(s) Both EYES at bedtime  metoprolol succinate ER 25 milliGRAM(s) Oral daily  pantoprazole    Tablet 40 milliGRAM(s) Oral before breakfast  polyvinyl alcohol 1.4%/povidone 0.6% Ophthalmic Solution - Peds 1 Drop(s) Both EYES daily  predniSONE   Tablet 40 milliGRAM(s) Oral daily  sertraline 50 milliGRAM(s) Oral daily  timolol 0.5% Solution 1 Drop(s) Both EYES two times a day    PRN Meds  acetaminophen   Tablet .. 650 milliGRAM(s) Oral every 6 hours PRN  nitroglycerin     SubLingual 0.4 milliGRAM(s) SubLingual every 5 minutes PRN  traMADol 50 milliGRAM(s) Oral every 8 hours PRN      Case discussed with resident    Care discussed with pt/family

## 2020-06-22 NOTE — CHART NOTE - NSCHARTNOTEFT_GEN_A_CORE
lili to see pt for device interrogation  Patient is in the stress test  will re-attempt later in  the afternoon

## 2020-06-22 NOTE — OCCUPATIONAL THERAPY INITIAL EVALUATION ADULT - PATIENT PROFILE REVIEW, REHAB EVAL
yes/Pt. chart thoroughly reviewed prior to OT IE. OT attempted to completed OT IE, however pt currently off the unit for test. OT to follow up.

## 2020-06-22 NOTE — PROGRESS NOTE ADULT - ASSESSMENT
RACHEL SUMMERS 84y Female  MRN#: [id number]   CODE STATUS:________    SUBJECTIVE  Patient is a 84y old Female who presents with a chief complaint of shortness of breath, troponinemia (2020 07:21)  Currently admitted to medicine with the primary diagnosis of SOB (shortness of breath)  Today is hospital day 2d, and this morning she is still experiencing left sided pleuritic chest pain. No overnight events.     OBJECTIVE  PAST MEDICAL & SURGICAL HISTORY  DVT, lower extremity  Rheumatoid arthritis  Diastolic heart failure  Diverticulitis: sigmoid  Gout  Hypertension  Pacemaker  Chronic kidney disease  History of hysterectomy  History of tonsillectomy  History of appendectomy  Artificial pacemaker    ALLERGIES:  Keflex (Unknown)    MEDICATIONS:  STANDING MEDICATIONS  aDENosine Injectable (ADENOSCAN) 60 milliGRAM(s) IV Bolus once  apixaban 2.5 milliGRAM(s) Oral every 12 hours  aspirin  chewable 81 milliGRAM(s) Oral daily  atorvastatin 40 milliGRAM(s) Oral at bedtime  atorvastatin Oral Tab/Cap - Peds 40 milliGRAM(s) Oral daily  calcium acetate 667 milliGRAM(s) Oral daily  chlorhexidine 4% Liquid 1 Application(s) Topical <User Schedule>  folic acid 1 milliGRAM(s) Oral daily  furosemide    Tablet 20 milliGRAM(s) Oral daily  latanoprost 0.005% Ophthalmic Solution 1 Drop(s) Both EYES at bedtime  metoprolol succinate ER 25 milliGRAM(s) Oral daily  pantoprazole    Tablet 40 milliGRAM(s) Oral before breakfast  polyvinyl alcohol 1.4%/povidone 0.6% Ophthalmic Solution - Peds 1 Drop(s) Both EYES daily  predniSONE   Tablet 40 milliGRAM(s) Oral daily  sertraline 50 milliGRAM(s) Oral daily  timolol 0.5% Solution 1 Drop(s) Both EYES two times a day    PRN MEDICATIONS  acetaminophen   Tablet .. 650 milliGRAM(s) Oral every 6 hours PRN  nitroglycerin     SubLingual 0.4 milliGRAM(s) SubLingual every 5 minutes PRN  traMADol 50 milliGRAM(s) Oral every 8 hours PRN      VITAL SIGNS: Last 24 Hours  T(C): 37.7 (2020 06:05), Max: 38.2 (2020 20:24)  T(F): 99.8 (2020 06:05), Max: 100.7 (2020 20:24)  HR: 73 (2020 06:05) (73 - 86)  BP: 152/74 (2020 06:05) (152/74 - 162/77)  BP(mean): --  RR: 18 (2020 06:05) (16 - 18)  SpO2: 97% (2020 21:15) (96% - 98%)    LABS:                        8.4    9.98  )-----------( 152      ( 2020 05:39 )             27.3     0622    142  |  105  |  37<H>  ----------------------------<  84  4.0   |  23  |  2.0<H>    Ca    8.2<L>      2020 05:39  Mg     2.1         TPro  5.6<L>  /  Alb  3.0<L>  /  TBili  0.5  /  DBili  x   /  AST  47<H>  /  ALT  24  /  AlkPhos  119<H>        Urinalysis Basic - ( 2020 14:11 )    Color: Light Yellow / Appearance: Clear / S.015 / pH: x  Gluc: x / Ketone: Negative  / Bili: Negative / Urobili: <2 mg/dL   Blood: x / Protein: 100 mg/dL / Nitrite: Negative   Leuk Esterase: Negative / RBC: 2 /HPF / WBC 3 /HPF   Sq Epi: x / Non Sq Epi: 3 /HPF / Bacteria: Negative            CARDIAC MARKERS ( 2020 06:02 )  x     / 0.04 ng/mL / x     / x     / x      CARDIAC MARKERS ( 2020 20:46 )  x     / 0.04 ng/mL / x     / x     / x      CARDIAC MARKERS ( 2020 18:25 )  x     / 0.04 ng/mL / x     / x     / x      CARDIAC MARKERS ( 2020 12:25 )  x     / 0.04 ng/mL / x     / x     / x          RADIOLOGY:    PHYSICAL EXAM:    GENERAL: NAD, alert  HEENT:  Atraumatic, Normocephalic.  PULMONARY: Clear to auscultation bilaterally; No wheeze  CARDIOVASCULAR: Regular rate and rhythm; No murmurs. reproducible chest pain on left sternum and under left breast  GASTROINTESTINAL: Soft, Nontender, Nondistended; Bowel sounds present  MUSCULOSKELETAL: no edema    ASSESSMENT & PLAN  84 year-old female with a PMH of DVT/PE (on Eliquis), HTN, early glaucoma, DLD, HFpEF (last EF 53% 2020), s/p PPM, CKD III/IV, gout, multiple caths (neg), stress test (neg per patient), rheumatoid arthritis, presenting for chest pressure and shortness of breath, chest pain and troponinemia.    # Intermittent shortness of breath, associated with sharp chest pain r/o pericarditis vs musculoskeletal pain from possible RA flare  S/p multiple cardiac caths, stress test in past, negative  EKG showing atrial sensed ventricular paced rhythm  Trops 0.04-0.04-0.04-0.04, slightly increased from baseline 0.02-0.03; 98% O2 sat on rm air  CXR did not reveal evidence of pleural effusion, + stable cardiomegaly  F/u  2D echo  Cardio c/s appreciated, stress test to r/o ischemia (will r/o once CP resolves)  c/w aspirin, BB. Start statins. F/U Aic and lipid profile.   could be musculoskeletal pain from possible RA flare. Start prednisone 40mg qd for now.    # Fever Tmax 102 F  no localizing source, WBC wnl  UA negative, CXR negative, f/u blood cx  f/u duplex LE  f/u ESR, CRP  keep off ABX, started on prednisone for possible RA flare    # CHFpEF   c/w home dose lasix 20mg qd  f/u 2d echo    # Anemia, likely chronic secondary to CKD   Trend CBCs, transfuse Hb < 8  Vitals per routine    # HTN, stable  /74 on this admission  C/w metoprolol succinate 25 mg PO once daily  Vitals per routine    # Mild hypokalemia - resolved   F/u repeat BMP    # H/o DVT, PE, stable  C/w Eliquis 2.5 mg PO BID  Vitals per routine    # CKD, at baseline  C/w calcium acetate 1 tab once daily  Renally restricted diet    # DLD  C/w atorvastatin 40 mg PO once daily    # Glaucoma, stable  C/w timolol, latanoprost, Systane gtt as ordered    # CHG  # DVT ppx- c/w Eliquis 2.5 mg BID  # GI ppx- pantoprazole  # Diet- DASH w/ renal and fluid restrictions  # Activity- out of bed to chair  # Dispo- acute; admitted for presumed HFpEF exacerbation RACHEL SUMMERS 84y Female  MRN#: [id number]   CODE STATUS:________    SUBJECTIVE  Patient is a 84y old Female who presents with a chief complaint of shortness of breath, troponinemia (2020 07:21)  Currently admitted to medicine with the primary diagnosis of SOB (shortness of breath)  Today is hospital day 2d, and this morning she is still experiencing left sided pleuritic chest pain. No overnight events.     OBJECTIVE  PAST MEDICAL & SURGICAL HISTORY  DVT, lower extremity  Rheumatoid arthritis  Diastolic heart failure  Diverticulitis: sigmoid  Gout  Hypertension  Pacemaker  Chronic kidney disease  History of hysterectomy  History of tonsillectomy  History of appendectomy  Artificial pacemaker    ALLERGIES:  Keflex (Unknown)    MEDICATIONS:  STANDING MEDICATIONS  aDENosine Injectable (ADENOSCAN) 60 milliGRAM(s) IV Bolus once  apixaban 2.5 milliGRAM(s) Oral every 12 hours  aspirin  chewable 81 milliGRAM(s) Oral daily  atorvastatin 40 milliGRAM(s) Oral at bedtime  atorvastatin Oral Tab/Cap - Peds 40 milliGRAM(s) Oral daily  calcium acetate 667 milliGRAM(s) Oral daily  chlorhexidine 4% Liquid 1 Application(s) Topical <User Schedule>  folic acid 1 milliGRAM(s) Oral daily  furosemide    Tablet 20 milliGRAM(s) Oral daily  latanoprost 0.005% Ophthalmic Solution 1 Drop(s) Both EYES at bedtime  metoprolol succinate ER 25 milliGRAM(s) Oral daily  pantoprazole    Tablet 40 milliGRAM(s) Oral before breakfast  polyvinyl alcohol 1.4%/povidone 0.6% Ophthalmic Solution - Peds 1 Drop(s) Both EYES daily  predniSONE   Tablet 40 milliGRAM(s) Oral daily  sertraline 50 milliGRAM(s) Oral daily  timolol 0.5% Solution 1 Drop(s) Both EYES two times a day    PRN MEDICATIONS  acetaminophen   Tablet .. 650 milliGRAM(s) Oral every 6 hours PRN  nitroglycerin     SubLingual 0.4 milliGRAM(s) SubLingual every 5 minutes PRN  traMADol 50 milliGRAM(s) Oral every 8 hours PRN      VITAL SIGNS: Last 24 Hours  T(C): 37.7 (2020 06:05), Max: 38.2 (2020 20:24)  T(F): 99.8 (2020 06:05), Max: 100.7 (2020 20:24)  HR: 73 (2020 06:05) (73 - 86)  BP: 152/74 (2020 06:05) (152/74 - 162/77)  BP(mean): --  RR: 18 (2020 06:05) (16 - 18)  SpO2: 97% (2020 21:15) (96% - 98%)    LABS:                        8.4    9.98  )-----------( 152      ( 2020 05:39 )             27.3     22    142  |  105  |  37<H>  ----------------------------<  84  4.0   |  23  |  2.0<H>    Ca    8.2<L>      2020 05:39  Mg     2.1         TPro  5.6<L>  /  Alb  3.0<L>  /  TBili  0.5  /  DBili  x   /  AST  47<H>  /  ALT  24  /  AlkPhos  119<H>        Urinalysis Basic - ( 2020 14:11 )    Color: Light Yellow / Appearance: Clear / S.015 / pH: x  Gluc: x / Ketone: Negative  / Bili: Negative / Urobili: <2 mg/dL   Blood: x / Protein: 100 mg/dL / Nitrite: Negative   Leuk Esterase: Negative / RBC: 2 /HPF / WBC 3 /HPF   Sq Epi: x / Non Sq Epi: 3 /HPF / Bacteria: Negative            CARDIAC MARKERS ( 2020 06:02 )  x     / 0.04 ng/mL / x     / x     / x      CARDIAC MARKERS ( 2020 20:46 )  x     / 0.04 ng/mL / x     / x     / x      CARDIAC MARKERS ( 2020 18:25 )  x     / 0.04 ng/mL / x     / x     / x      CARDIAC MARKERS ( 2020 12:25 )  x     / 0.04 ng/mL / x     / x     / x          RADIOLOGY:  < from: Transthoracic Echocardiogram (20 @ 11:33) >  Summary:   1. Left ventricular ejection fraction, by visual estimation, is 20 to 25%.   2. Severely decreased global left ventricular systolic function.   3. Spectral Doppler shows pseudonormal pattern of left ventricular myocardial filling (Grade II diastolic dysfunction).   4. Left atrium is mildly enlarged.   5. Mildly enlarged right atrium.   6. Mild mitral valve regurgitation.   7. Mild thickening of the anterior and posterior mitral valve leaflets.   8. Moderate tricuspid regurgitation.   9. Estimated pulmonary artery systolic pressure is 63.7 mmHg assuming a right atrial pressure of 8 mmHg, which is consistent with severe pulmonary hypertension.    < end of copied text >    < from: Xray Chest 1 View AP/PA (20 @ 14:13) >  Findings:    Support devices: Pacemaker leads in place    Cardiac/mediastinum/hilum: Cardiomegaly unchanged    Lung parenchyma/Pleura: Within normal limits.    Skeleton/soft tissues: Unremarkable.    Impression:      No radiographic evidence of acute cardiopulmonary disease.    < end of copied text >    < from: 12 Lead ECG (20 @ 12:02) >  Diagnosis Line Atrial-sensed ventricular-paced rhythm  Abnormal ECG    < end of copied text >    PHYSICAL EXAM:    GENERAL: NAD, alert  HEENT:  Atraumatic, Normocephalic.  PULMONARY: Clear to auscultation bilaterally; No wheeze  CARDIOVASCULAR: Regular rate and rhythm; No murmurs. reproducible chest pain on left sternum and under left breast  GASTROINTESTINAL: Soft, Nontender, Nondistended; Bowel sounds present  MUSCULOSKELETAL: no edema    ASSESSMENT & PLAN  84 year-old female with a PMH of DVT/PE (on Eliquis), HTN, early glaucoma, DLD, HFpEF (last EF 53% 2020), s/p PPM, CKD III/IV, gout, multiple caths (neg), stress test (neg per patient), rheumatoid arthritis, presenting for chest pressure and shortness of breath, chest pain and troponinemia.    #Acute exacerbation of HFpEF  S/p multiple cardiac caths, stress test in past, negative  EKG showing atrial sensed ventricular paced rhythm  Trops 0.04-0.04-0.04-0.04, slightly increased from baseline 0.02-0.03; 97% O2 sat on NC 2 L  CXR did reveal no evidence of pleural effusion + stable cardiomegaly  ECHO 2020 EF 53%;  EF 20-25%, Mild MR, Moderate TR, severe pulmonary HTN  Cardio following, stress test to r/o ischemia pending  c/w lasix 20 mg po daily  c/w aspirin, metoprolol, statins.   Nuclear stress test:       #Rheumatoid Arthritis  started on prednisone 40 mg tab daily      #Fever Tmax 102 F- resolved- infection vs autoimmune flare?  no localizing source, WBC wnl  UA negative, CXR negative, f/u blood cx  LE duplex negative for any DVT  f/u ESR, CRP???  started on prednisone for possible RA flare    # Anemia, likely chronic secondary to CKD   Trend CBCs, transfuse Hb < 8  Vitals per routine    # HTN, stable  /74 on this admission  C/w metoprolol succinate 25 mg PO once daily  Vitals per routine    # Mild hypokalemia - resolved   F/u repeat BMP    # H/o DVT, PE, stable  C/w Eliquis 2.5 mg PO BID  Vitals per routine    # CKD, at baseline  C/w calcium acetate 1 tab once daily  Renally restricted diet    # DLD  C/w atorvastatin 40 mg PO once daily    # Glaucoma, stable  C/w timolol, latanoprost, Systane gtt as ordered    # CHG  # DVT ppx- c/w Eliquis 2.5 mg BID  # GI ppx- pantoprazole  # Diet- DASH w/ renal and fluid restrictions  # Activity- out of bed to chair  # Dispo- acute; admitted for presumed HFpEF exacerbation RACHEL SUMMERS 84y Female  MRN#: [id number]   CODE STATUS:________    SUBJECTIVE  Patient is a 84y old Female who presents with a chief complaint of shortness of breath, troponinemia (2020 07:21)  Currently admitted to medicine with the primary diagnosis of SOB (shortness of breath)  Today is hospital day 2d, and this morning she is still experiencing left sided pleuritic chest pain. No overnight events.     OBJECTIVE  PAST MEDICAL & SURGICAL HISTORY  DVT, lower extremity  Rheumatoid arthritis  Diastolic heart failure  Diverticulitis: sigmoid  Gout  Hypertension  Pacemaker  Chronic kidney disease  History of hysterectomy  History of tonsillectomy  History of appendectomy  Artificial pacemaker    ALLERGIES:  Keflex (Unknown)    MEDICATIONS:  STANDING MEDICATIONS  aDENosine Injectable (ADENOSCAN) 60 milliGRAM(s) IV Bolus once  apixaban 2.5 milliGRAM(s) Oral every 12 hours  aspirin  chewable 81 milliGRAM(s) Oral daily  atorvastatin 40 milliGRAM(s) Oral at bedtime  atorvastatin Oral Tab/Cap - Peds 40 milliGRAM(s) Oral daily  calcium acetate 667 milliGRAM(s) Oral daily  chlorhexidine 4% Liquid 1 Application(s) Topical <User Schedule>  folic acid 1 milliGRAM(s) Oral daily  furosemide    Tablet 20 milliGRAM(s) Oral daily  latanoprost 0.005% Ophthalmic Solution 1 Drop(s) Both EYES at bedtime  metoprolol succinate ER 25 milliGRAM(s) Oral daily  pantoprazole    Tablet 40 milliGRAM(s) Oral before breakfast  polyvinyl alcohol 1.4%/povidone 0.6% Ophthalmic Solution - Peds 1 Drop(s) Both EYES daily  predniSONE   Tablet 40 milliGRAM(s) Oral daily  sertraline 50 milliGRAM(s) Oral daily  timolol 0.5% Solution 1 Drop(s) Both EYES two times a day    PRN MEDICATIONS  acetaminophen   Tablet .. 650 milliGRAM(s) Oral every 6 hours PRN  nitroglycerin     SubLingual 0.4 milliGRAM(s) SubLingual every 5 minutes PRN  traMADol 50 milliGRAM(s) Oral every 8 hours PRN      VITAL SIGNS: Last 24 Hours  T(C): 37.7 (2020 06:05), Max: 38.2 (2020 20:24)  T(F): 99.8 (2020 06:05), Max: 100.7 (2020 20:24)  HR: 73 (2020 06:05) (73 - 86)  BP: 152/74 (2020 06:05) (152/74 - 162/77)  BP(mean): --  RR: 18 (2020 06:05) (16 - 18)  SpO2: 97% (2020 21:15) (96% - 98%)    LABS:                        8.4    9.98  )-----------( 152      ( 2020 05:39 )             27.3     22    142  |  105  |  37<H>  ----------------------------<  84  4.0   |  23  |  2.0<H>    Ca    8.2<L>      2020 05:39  Mg     2.1         TPro  5.6<L>  /  Alb  3.0<L>  /  TBili  0.5  /  DBili  x   /  AST  47<H>  /  ALT  24  /  AlkPhos  119<H>        Urinalysis Basic - ( 2020 14:11 )    Color: Light Yellow / Appearance: Clear / S.015 / pH: x  Gluc: x / Ketone: Negative  / Bili: Negative / Urobili: <2 mg/dL   Blood: x / Protein: 100 mg/dL / Nitrite: Negative   Leuk Esterase: Negative / RBC: 2 /HPF / WBC 3 /HPF   Sq Epi: x / Non Sq Epi: 3 /HPF / Bacteria: Negative            CARDIAC MARKERS ( 2020 06:02 )  x     / 0.04 ng/mL / x     / x     / x      CARDIAC MARKERS ( 2020 20:46 )  x     / 0.04 ng/mL / x     / x     / x      CARDIAC MARKERS ( 2020 18:25 )  x     / 0.04 ng/mL / x     / x     / x      CARDIAC MARKERS ( 2020 12:25 )  x     / 0.04 ng/mL / x     / x     / x          RADIOLOGY:  < from: Transthoracic Echocardiogram (20 @ 11:33) >  Summary:   1. Left ventricular ejection fraction, by visual estimation, is 20 to 25%.   2. Severely decreased global left ventricular systolic function.   3. Spectral Doppler shows pseudonormal pattern of left ventricular myocardial filling (Grade II diastolic dysfunction).   4. Left atrium is mildly enlarged.   5. Mildly enlarged right atrium.   6. Mild mitral valve regurgitation.   7. Mild thickening of the anterior and posterior mitral valve leaflets.   8. Moderate tricuspid regurgitation.   9. Estimated pulmonary artery systolic pressure is 63.7 mmHg assuming a right atrial pressure of 8 mmHg, which is consistent with severe pulmonary hypertension.    < end of copied text >    < from: Xray Chest 1 View AP/PA (20 @ 14:13) >  Findings:    Support devices: Pacemaker leads in place    Cardiac/mediastinum/hilum: Cardiomegaly unchanged    Lung parenchyma/Pleura: Within normal limits.    Skeleton/soft tissues: Unremarkable.    Impression:      No radiographic evidence of acute cardiopulmonary disease.    < end of copied text >    < from: 12 Lead ECG (20 @ 12:02) >  Diagnosis Line Atrial-sensed ventricular-paced rhythm  Abnormal ECG    < end of copied text >    < from: NM Nuclear Stress Pharmacologic Multiple (20 @ 11:26) >  Impression:   1. NORMAL ADENOSINE / REST MYOCARDIAL PERFUSION SPECT TOMOGRAPHY, WITH NO EVIDENCE FOR ISCHEMIA DURING ADENOSINE INFUSION.   2. NORMAL RESTING LEFT VENTRICULAR WALL MOTION AND WALL THICKENING.  3. LEFT VENTRICULAR EJECTION FRACTION OF  56 % WHICH IS WITHIN RANGE OF NORMAL.       < end of copied text >    PHYSICAL EXAM:    GENERAL: NAD, alert  HEENT:  Atraumatic, Normocephalic.  PULMONARY: Clear to auscultation bilaterally; No wheeze  CARDIOVASCULAR: Regular rate and rhythm; No murmurs. reproducible chest pain on left sternum and under left breast  GASTROINTESTINAL: Soft, Nontender, Nondistended; Bowel sounds present  MUSCULOSKELETAL: no edema    ASSESSMENT & PLAN  84 year-old female with a PMH of DVT/PE (on Eliquis), HTN, early glaucoma, DLD, HFpEF (last EF 53% 2020), s/p PPM, CKD III/IV, gout, multiple caths (neg), stress test (neg per patient), rheumatoid arthritis, presenting for chest pressure and shortness of breath, chest pain and troponinemia.    #Chest pain secondary to acute exacerbation of HFpEF vs nonischemic cardiomyopathy  S/p multiple cardiac caths, stress test in past, negative  Nuclear stress test negative   EKG showing atrial sensed ventricular paced rhythm  Trops 0.04-0.04-0.04-0.04, slightly increased from baseline 0.02-0.03; 97% O2 sat on NC 2 L  CXR did reveal no evidence of pleural effusion + stable cardiomegaly  ECHO 2020 EF 53%;  EF 20-25%, Mild MR, Moderate TR, severe pulmonary HTN  Cardio following  c/w lasix 20 mg po daily  c/w aspirin, metoprolol, statins.         #Rheumatoid Arthritis  started on prednisone 40 mg tab daily    #Fever Tmax 102 F- resolved- secondary possible infection vs autoimmune flare vs pericarditis?  no localizing source, WBC wnl  UA negative, CXR negative, f/u blood cx  LE duplex negative for any DVT  f/u ESR, CRP???  started on prednisone for possible RA flare    #Anemia, likely chronic secondary to CKD   Trend CBCs, transfuse Hb < 8  Vitals per routine    # HTN, stable  /74 on this admission  C/w metoprolol succinate 25 mg PO once daily  Vitals per routine    # Mild hypokalemia - resolved   F/u repeat BMP    # H/o DVT, PE, stable  C/w Eliquis 2.5 mg PO BID  Vitals per routine    # CKD, at baseline  C/w calcium acetate 1 tab once daily  Renally restricted diet    # DLD  C/w atorvastatin 40 mg PO once daily    # Glaucoma, stable  C/w timolol, latanoprost, Systane gtt as ordered    # CHG  # DVT ppx- c/w Eliquis 2.5 mg BID  # GI ppx- pantoprazole  # Diet- DASH w/ renal and fluid restrictions  # Activity- out of bed to chair  # Dispo- acute; admitted for presumed HFpEF exacerbation RACHEL SUMMERS 84y Female  MRN#: [id number]   CODE STATUS:________    SUBJECTIVE  Patient is a 84y old Female who presents with a chief complaint of shortness of breath, troponinemia (2020 07:21)  Currently admitted to medicine with the primary diagnosis of SOB (shortness of breath)  Today is hospital day 2d, and this morning she is still experiencing left sided pleuritic chest pain. No overnight events.     OBJECTIVE  PAST MEDICAL & SURGICAL HISTORY  DVT, lower extremity  Rheumatoid arthritis  Diastolic heart failure  Diverticulitis: sigmoid  Gout  Hypertension  Pacemaker  Chronic kidney disease  History of hysterectomy  History of tonsillectomy  History of appendectomy  Artificial pacemaker    ALLERGIES:  Keflex (Unknown)    MEDICATIONS:  STANDING MEDICATIONS  aDENosine Injectable (ADENOSCAN) 60 milliGRAM(s) IV Bolus once  apixaban 2.5 milliGRAM(s) Oral every 12 hours  aspirin  chewable 81 milliGRAM(s) Oral daily  atorvastatin 40 milliGRAM(s) Oral at bedtime  atorvastatin Oral Tab/Cap - Peds 40 milliGRAM(s) Oral daily  calcium acetate 667 milliGRAM(s) Oral daily  chlorhexidine 4% Liquid 1 Application(s) Topical <User Schedule>  folic acid 1 milliGRAM(s) Oral daily  furosemide    Tablet 20 milliGRAM(s) Oral daily  latanoprost 0.005% Ophthalmic Solution 1 Drop(s) Both EYES at bedtime  metoprolol succinate ER 25 milliGRAM(s) Oral daily  pantoprazole    Tablet 40 milliGRAM(s) Oral before breakfast  polyvinyl alcohol 1.4%/povidone 0.6% Ophthalmic Solution - Peds 1 Drop(s) Both EYES daily  predniSONE   Tablet 40 milliGRAM(s) Oral daily  sertraline 50 milliGRAM(s) Oral daily  timolol 0.5% Solution 1 Drop(s) Both EYES two times a day    PRN MEDICATIONS  acetaminophen   Tablet .. 650 milliGRAM(s) Oral every 6 hours PRN  nitroglycerin     SubLingual 0.4 milliGRAM(s) SubLingual every 5 minutes PRN  traMADol 50 milliGRAM(s) Oral every 8 hours PRN      VITAL SIGNS: Last 24 Hours  T(C): 37.7 (2020 06:05), Max: 38.2 (2020 20:24)  T(F): 99.8 (2020 06:05), Max: 100.7 (2020 20:24)  HR: 73 (2020 06:05) (73 - 86)  BP: 152/74 (2020 06:05) (152/74 - 162/77)  BP(mean): --  RR: 18 (2020 06:05) (16 - 18)  SpO2: 97% (2020 21:15) (96% - 98%)    LABS:                        8.4    9.98  )-----------( 152      ( 2020 05:39 )             27.3     22    142  |  105  |  37<H>  ----------------------------<  84  4.0   |  23  |  2.0<H>    Ca    8.2<L>      2020 05:39  Mg     2.1         TPro  5.6<L>  /  Alb  3.0<L>  /  TBili  0.5  /  DBili  x   /  AST  47<H>  /  ALT  24  /  AlkPhos  119<H>        Urinalysis Basic - ( 2020 14:11 )    Color: Light Yellow / Appearance: Clear / S.015 / pH: x  Gluc: x / Ketone: Negative  / Bili: Negative / Urobili: <2 mg/dL   Blood: x / Protein: 100 mg/dL / Nitrite: Negative   Leuk Esterase: Negative / RBC: 2 /HPF / WBC 3 /HPF   Sq Epi: x / Non Sq Epi: 3 /HPF / Bacteria: Negative            CARDIAC MARKERS ( 2020 06:02 )  x     / 0.04 ng/mL / x     / x     / x      CARDIAC MARKERS ( 2020 20:46 )  x     / 0.04 ng/mL / x     / x     / x      CARDIAC MARKERS ( 2020 18:25 )  x     / 0.04 ng/mL / x     / x     / x      CARDIAC MARKERS ( 2020 12:25 )  x     / 0.04 ng/mL / x     / x     / x          RADIOLOGY:  < from: Transthoracic Echocardiogram (20 @ 11:33) >  Summary:   1. Left ventricular ejection fraction, by visual estimation, is 20 to 25%.   2. Severely decreased global left ventricular systolic function.   3. Spectral Doppler shows pseudonormal pattern of left ventricular myocardial filling (Grade II diastolic dysfunction).   4. Left atrium is mildly enlarged.   5. Mildly enlarged right atrium.   6. Mild mitral valve regurgitation.   7. Mild thickening of the anterior and posterior mitral valve leaflets.   8. Moderate tricuspid regurgitation.   9. Estimated pulmonary artery systolic pressure is 63.7 mmHg assuming a right atrial pressure of 8 mmHg, which is consistent with severe pulmonary hypertension.    < end of copied text >    < from: Xray Chest 1 View AP/PA (20 @ 14:13) >  Findings:    Support devices: Pacemaker leads in place    Cardiac/mediastinum/hilum: Cardiomegaly unchanged    Lung parenchyma/Pleura: Within normal limits.    Skeleton/soft tissues: Unremarkable.    Impression:      No radiographic evidence of acute cardiopulmonary disease.    < end of copied text >    < from: 12 Lead ECG (20 @ 12:02) >  Diagnosis Line Atrial-sensed ventricular-paced rhythm  Abnormal ECG    < end of copied text >    < from: NM Nuclear Stress Pharmacologic Multiple (20 @ 11:26) >  Impression:   1. NORMAL ADENOSINE / REST MYOCARDIAL PERFUSION SPECT TOMOGRAPHY, WITH NO EVIDENCE FOR ISCHEMIA DURING ADENOSINE INFUSION.   2. NORMAL RESTING LEFT VENTRICULAR WALL MOTION AND WALL THICKENING.  3. LEFT VENTRICULAR EJECTION FRACTION OF  56 % WHICH IS WITHIN RANGE OF NORMAL.       < end of copied text >    PHYSICAL EXAM:    GENERAL: NAD, alert  HEENT:  Atraumatic, Normocephalic.  PULMONARY: Clear to auscultation bilaterally; No wheeze  CARDIOVASCULAR: Regular rate and rhythm; No murmurs. reproducible chest pain on left sternum and under left breast  GASTROINTESTINAL: Soft, Nontender, Nondistended; Bowel sounds present  MUSCULOSKELETAL: no edema    ASSESSMENT & PLAN  84 year-old female with a PMH of DVT/PE (on Eliquis), HTN, early glaucoma, DLD, HFpEF (last EF 53% 2020), s/p PPM, CKD III/IV, gout, multiple caths (neg), stress test (neg per patient), rheumatoid arthritis, presenting for chest pressure and shortness of breath, chest pain and troponinemia.    #Chest pain secondary to acute exacerbation of HFpEF vs nonischemic cardiomyopathy  S/p multiple cardiac caths, stress test in past, negative  Nuclear stress test negative   EKG showing atrial sensed ventricular paced rhythm  Trops 0.04-0.04-0.04-0.04, slightly increased from baseline 0.02-0.03; 97% O2 sat on NC 2 L  CXR did reveal no evidence of pleural effusion + stable cardiomegaly  ECHO 2020 EF 53%;  EF 20-25%, Mild MR, Moderate TR, severe pulmonary HTN  Cardio following  c/w lasix 20 mg po daily  c/w aspirin, metoprolol, statins.   spoke to Dr. Rivera (cardio) and will be talking to the patient today on further assessment    #Rheumatoid Arthritis  started on prednisone 40 mg tab daily    #Fever Tmax 102 F- resolved- secondary possible infection vs autoimmune flare vs pericarditis?  no localizing source, WBC wnl  UA negative, CXR negative, f/u blood cx  LE duplex negative for any DVT  f/u ESR, CRP, procal  started on prednisone for possible RA flare    #Anemia, likely chronic secondary to CKD   Trend CBCs, transfuse Hb < 8  Vitals per routine    # HTN, stable  /74 on this admission  C/w metoprolol succinate 25 mg PO once daily  Vitals per routine    # Mild hypokalemia - resolved   F/u repeat BMP    # H/o DVT, PE, stable  C/w Eliquis 2.5 mg PO BID  Vitals per routine    # CKD, at baseline  C/w calcium acetate 1 tab once daily  Renally restricted diet    # DLD  C/w atorvastatin 40 mg PO once daily    # Glaucoma, stable  C/w timolol, latanoprost, Systane gtt as ordered    # CHG  # DVT ppx- c/w Eliquis 2.5 mg BID  # GI ppx- pantoprazole  # Diet- DASH w/ renal and fluid restrictions  # Activity- out of bed to chair  # Dispo- acute; admitted for presumed HFpEF exacerbation RACHEL SUMMERS 84y Female  MRN#: [id number]   CODE STATUS:________    SUBJECTIVE  Patient is a 84y old Female who presents with a chief complaint of shortness of breath, troponinemia (2020 07:21)  Currently admitted to medicine with the primary diagnosis of SOB (shortness of breath)  Today is hospital day 2d, and this morning she is still experiencing left sided pleuritic chest pain. No overnight events.     OBJECTIVE  PAST MEDICAL & SURGICAL HISTORY  DVT, lower extremity  Rheumatoid arthritis  Diastolic heart failure  Diverticulitis: sigmoid  Gout  Hypertension  Pacemaker  Chronic kidney disease  History of hysterectomy  History of tonsillectomy  History of appendectomy  Artificial pacemaker    ALLERGIES:  Keflex (Unknown)    MEDICATIONS:  STANDING MEDICATIONS  aDENosine Injectable (ADENOSCAN) 60 milliGRAM(s) IV Bolus once  apixaban 2.5 milliGRAM(s) Oral every 12 hours  aspirin  chewable 81 milliGRAM(s) Oral daily  atorvastatin 40 milliGRAM(s) Oral at bedtime  atorvastatin Oral Tab/Cap - Peds 40 milliGRAM(s) Oral daily  calcium acetate 667 milliGRAM(s) Oral daily  chlorhexidine 4% Liquid 1 Application(s) Topical <User Schedule>  folic acid 1 milliGRAM(s) Oral daily  furosemide    Tablet 20 milliGRAM(s) Oral daily  latanoprost 0.005% Ophthalmic Solution 1 Drop(s) Both EYES at bedtime  metoprolol succinate ER 25 milliGRAM(s) Oral daily  pantoprazole    Tablet 40 milliGRAM(s) Oral before breakfast  polyvinyl alcohol 1.4%/povidone 0.6% Ophthalmic Solution - Peds 1 Drop(s) Both EYES daily  predniSONE   Tablet 40 milliGRAM(s) Oral daily  sertraline 50 milliGRAM(s) Oral daily  timolol 0.5% Solution 1 Drop(s) Both EYES two times a day    PRN MEDICATIONS  acetaminophen   Tablet .. 650 milliGRAM(s) Oral every 6 hours PRN  nitroglycerin     SubLingual 0.4 milliGRAM(s) SubLingual every 5 minutes PRN  traMADol 50 milliGRAM(s) Oral every 8 hours PRN      VITAL SIGNS: Last 24 Hours  T(C): 37.7 (2020 06:05), Max: 38.2 (2020 20:24)  T(F): 99.8 (2020 06:05), Max: 100.7 (2020 20:24)  HR: 73 (2020 06:05) (73 - 86)  BP: 152/74 (2020 06:05) (152/74 - 162/77)  BP(mean): --  RR: 18 (2020 06:05) (16 - 18)  SpO2: 97% (2020 21:15) (96% - 98%)    LABS:                        8.4    9.98  )-----------( 152      ( 2020 05:39 )             27.3     22    142  |  105  |  37<H>  ----------------------------<  84  4.0   |  23  |  2.0<H>    Ca    8.2<L>      2020 05:39  Mg     2.1         TPro  5.6<L>  /  Alb  3.0<L>  /  TBili  0.5  /  DBili  x   /  AST  47<H>  /  ALT  24  /  AlkPhos  119<H>        Urinalysis Basic - ( 2020 14:11 )    Color: Light Yellow / Appearance: Clear / S.015 / pH: x  Gluc: x / Ketone: Negative  / Bili: Negative / Urobili: <2 mg/dL   Blood: x / Protein: 100 mg/dL / Nitrite: Negative   Leuk Esterase: Negative / RBC: 2 /HPF / WBC 3 /HPF   Sq Epi: x / Non Sq Epi: 3 /HPF / Bacteria: Negative            CARDIAC MARKERS ( 2020 06:02 )  x     / 0.04 ng/mL / x     / x     / x      CARDIAC MARKERS ( 2020 20:46 )  x     / 0.04 ng/mL / x     / x     / x      CARDIAC MARKERS ( 2020 18:25 )  x     / 0.04 ng/mL / x     / x     / x      CARDIAC MARKERS ( 2020 12:25 )  x     / 0.04 ng/mL / x     / x     / x          RADIOLOGY:  < from: Transthoracic Echocardiogram (20 @ 11:33) >  Summary:   1. Left ventricular ejection fraction, by visual estimation, is 20 to 25%.   2. Severely decreased global left ventricular systolic function.   3. Spectral Doppler shows pseudonormal pattern of left ventricular myocardial filling (Grade II diastolic dysfunction).   4. Left atrium is mildly enlarged.   5. Mildly enlarged right atrium.   6. Mild mitral valve regurgitation.   7. Mild thickening of the anterior and posterior mitral valve leaflets.   8. Moderate tricuspid regurgitation.   9. Estimated pulmonary artery systolic pressure is 63.7 mmHg assuming a right atrial pressure of 8 mmHg, which is consistent with severe pulmonary hypertension.    < end of copied text >    < from: Xray Chest 1 View AP/PA (20 @ 14:13) >  Findings:    Support devices: Pacemaker leads in place    Cardiac/mediastinum/hilum: Cardiomegaly unchanged    Lung parenchyma/Pleura: Within normal limits.    Skeleton/soft tissues: Unremarkable.    Impression:      No radiographic evidence of acute cardiopulmonary disease.    < end of copied text >    < from: 12 Lead ECG (20 @ 12:02) >  Diagnosis Line Atrial-sensed ventricular-paced rhythm  Abnormal ECG    < end of copied text >    < from: NM Nuclear Stress Pharmacologic Multiple (20 @ 11:26) >  Impression:   1. NORMAL ADENOSINE / REST MYOCARDIAL PERFUSION SPECT TOMOGRAPHY, WITH NO EVIDENCE FOR ISCHEMIA DURING ADENOSINE INFUSION.   2. NORMAL RESTING LEFT VENTRICULAR WALL MOTION AND WALL THICKENING.  3. LEFT VENTRICULAR EJECTION FRACTION OF  56 % WHICH IS WITHIN RANGE OF NORMAL.       < end of copied text >    PHYSICAL EXAM:    GENERAL: NAD, alert  HEENT:  Atraumatic, Normocephalic.  PULMONARY: Clear to auscultation bilaterally; No wheeze  CARDIOVASCULAR: Regular rate and rhythm; No murmurs. reproducible chest pain on left sternum and under left breast  GASTROINTESTINAL: Soft, Nontender, Nondistended; Bowel sounds present  MUSCULOSKELETAL: no edema    ASSESSMENT & PLAN  84 year-old female with a PMH of DVT/PE (on Eliquis), HTN, early glaucoma, DLD, HFpEF (last EF 53% 2020), s/p PPM, CKD III/IV, gout, multiple caths (neg), stress test (neg per patient), rheumatoid arthritis, presenting for chest pressure and shortness of breath, chest pain and troponinemia.    #Chest pain secondary to acute exacerbation of HFpEF vs nonischemic cardiomyopathy  S/p multiple cardiac caths, stress test in past, negative  Nuclear stress test negative   EKG showing atrial sensed ventricular paced rhythm  Trops 0.04-0.04-0.04-0.04, slightly increased from baseline 0.02-0.03; 97% O2 sat on NC 2 L  CXR did reveal no evidence of pleural effusion + stable cardiomegaly  ECHO 2020 EF 53%;  EF 20-25%, Mild MR, Moderate TR, severe pulmonary HTN  Cardio following  c/w lasix 20 mg po daily  c/w aspirin, metoprolol, statins.   spoke to Dr. Rivera (cardio) and will be talking to the patient today on further assessment  EP following, will need to follow up with Dr. Baez in a month for battery change for ICD  #Rheumatoid Arthritis  started on prednisone 40 mg tab daily    #Fever Tmax 102 F- resolved- secondary possible infection vs autoimmune flare vs pericarditis?  no localizing source, WBC wnl  UA negative, CXR negative, f/u blood cx  LE duplex negative for any DVT  f/u ESR, CRP, procal  started on prednisone for possible RA flare    #Anemia, likely chronic secondary to CKD   Trend CBCs, transfuse Hb < 8  Vitals per routine    # HTN, stable  /74 on this admission  C/w metoprolol succinate 25 mg PO once daily  Vitals per routine    # Mild hypokalemia - resolved   F/u repeat BMP    # H/o DVT, PE, stable  C/w Eliquis 2.5 mg PO BID  Vitals per routine    # CKD, at baseline  C/w calcium acetate 1 tab once daily  Renally restricted diet    # DLD  C/w atorvastatin 40 mg PO once daily    # Glaucoma, stable  C/w timolol, latanoprost, Systane gtt as ordered    # CHG  # DVT ppx- c/w Eliquis 2.5 mg BID  # GI ppx- pantoprazole  # Diet- DASH w/ renal and fluid restrictions  # Activity- out of bed to chair  # Dispo- acute; admitted for presumed HFpEF exacerbation

## 2020-06-23 ENCOUNTER — TRANSCRIPTION ENCOUNTER (OUTPATIENT)
Age: 85
End: 2020-06-23

## 2020-06-23 VITALS — RESPIRATION RATE: 18 BRPM | SYSTOLIC BLOOD PRESSURE: 144 MMHG | HEART RATE: 64 BPM | DIASTOLIC BLOOD PRESSURE: 63 MMHG

## 2020-06-23 LAB
ALBUMIN SERPL ELPH-MCNC: 3.1 G/DL — LOW (ref 3.5–5.2)
ALP SERPL-CCNC: 123 U/L — HIGH (ref 30–115)
ALT FLD-CCNC: 21 U/L — SIGNIFICANT CHANGE UP (ref 0–41)
ANION GAP SERPL CALC-SCNC: 15 MMOL/L — HIGH (ref 7–14)
AST SERPL-CCNC: 33 U/L — SIGNIFICANT CHANGE UP (ref 0–41)
BASOPHILS # BLD AUTO: 0.02 K/UL — SIGNIFICANT CHANGE UP (ref 0–0.2)
BASOPHILS NFR BLD AUTO: 0.2 % — SIGNIFICANT CHANGE UP (ref 0–1)
BILIRUB SERPL-MCNC: 0.2 MG/DL — SIGNIFICANT CHANGE UP (ref 0.2–1.2)
BUN SERPL-MCNC: 57 MG/DL — HIGH (ref 10–20)
CALCIUM SERPL-MCNC: 8.1 MG/DL — LOW (ref 8.5–10.1)
CHLORIDE SERPL-SCNC: 101 MMOL/L — SIGNIFICANT CHANGE UP (ref 98–110)
CO2 SERPL-SCNC: 20 MMOL/L — SIGNIFICANT CHANGE UP (ref 17–32)
CREAT SERPL-MCNC: 2.5 MG/DL — HIGH (ref 0.7–1.5)
CRP SERPL-MCNC: 15.54 MG/DL — HIGH (ref 0–0.4)
EOSINOPHIL # BLD AUTO: 0.04 K/UL — SIGNIFICANT CHANGE UP (ref 0–0.7)
EOSINOPHIL NFR BLD AUTO: 0.3 % — SIGNIFICANT CHANGE UP (ref 0–8)
ERYTHROCYTE [SEDIMENTATION RATE] IN BLOOD: 126 MM/HR — HIGH (ref 0–20)
FERRITIN SERPL-MCNC: 128 NG/ML — SIGNIFICANT CHANGE UP (ref 15–150)
GLUCOSE SERPL-MCNC: 132 MG/DL — HIGH (ref 70–99)
HCT VFR BLD CALC: 28.7 % — LOW (ref 37–47)
HGB BLD-MCNC: 9.3 G/DL — LOW (ref 12–16)
IMM GRANULOCYTES NFR BLD AUTO: 0.5 % — HIGH (ref 0.1–0.3)
LYMPHOCYTES # BLD AUTO: 1.29 K/UL — SIGNIFICANT CHANGE UP (ref 1.2–3.4)
LYMPHOCYTES # BLD AUTO: 10.3 % — LOW (ref 20.5–51.1)
MAGNESIUM SERPL-MCNC: 2.1 MG/DL — SIGNIFICANT CHANGE UP (ref 1.8–2.4)
MCHC RBC-ENTMCNC: 29.8 PG — SIGNIFICANT CHANGE UP (ref 27–31)
MCHC RBC-ENTMCNC: 32.4 G/DL — SIGNIFICANT CHANGE UP (ref 32–37)
MCV RBC AUTO: 92 FL — SIGNIFICANT CHANGE UP (ref 81–99)
MONOCYTES # BLD AUTO: 0.91 K/UL — HIGH (ref 0.1–0.6)
MONOCYTES NFR BLD AUTO: 7.3 % — SIGNIFICANT CHANGE UP (ref 1.7–9.3)
NEUTROPHILS # BLD AUTO: 10.17 K/UL — HIGH (ref 1.4–6.5)
NEUTROPHILS NFR BLD AUTO: 81.4 % — HIGH (ref 42.2–75.2)
NRBC # BLD: 0 /100 WBCS — SIGNIFICANT CHANGE UP (ref 0–0)
PLATELET # BLD AUTO: 193 K/UL — SIGNIFICANT CHANGE UP (ref 130–400)
POTASSIUM SERPL-MCNC: 3.9 MMOL/L — SIGNIFICANT CHANGE UP (ref 3.5–5)
POTASSIUM SERPL-SCNC: 3.9 MMOL/L — SIGNIFICANT CHANGE UP (ref 3.5–5)
PROCALCITONIN SERPL-MCNC: 0.81 NG/ML — HIGH (ref 0.02–0.1)
PROT SERPL-MCNC: 6.1 G/DL — SIGNIFICANT CHANGE UP (ref 6–8)
RBC # BLD: 3.12 M/UL — LOW (ref 4.2–5.4)
RBC # BLD: 3.12 M/UL — LOW (ref 4.2–5.4)
RBC # FLD: 14.1 % — SIGNIFICANT CHANGE UP (ref 11.5–14.5)
RETICS #: 54.9 K/UL — SIGNIFICANT CHANGE UP (ref 25–125)
RETICS/RBC NFR: 1.8 % — HIGH (ref 0.5–1.5)
SODIUM SERPL-SCNC: 136 MMOL/L — SIGNIFICANT CHANGE UP (ref 135–146)
WBC # BLD: 12.49 K/UL — HIGH (ref 4.8–10.8)
WBC # FLD AUTO: 12.49 K/UL — HIGH (ref 4.8–10.8)

## 2020-06-23 PROCEDURE — 93010 ELECTROCARDIOGRAM REPORT: CPT

## 2020-06-23 PROCEDURE — 99233 SBSQ HOSP IP/OBS HIGH 50: CPT

## 2020-06-23 RX ORDER — FUROSEMIDE 40 MG
1 TABLET ORAL
Qty: 0 | Refills: 0 | DISCHARGE
Start: 2020-06-23

## 2020-06-23 RX ORDER — IRON SUCROSE 20 MG/ML
200 INJECTION, SOLUTION INTRAVENOUS ONCE
Refills: 0 | Status: COMPLETED | OUTPATIENT
Start: 2020-06-23 | End: 2020-06-23

## 2020-06-23 RX ORDER — FERROUS SULFATE 325(65) MG
1 TABLET ORAL
Qty: 30 | Refills: 0
Start: 2020-06-23 | End: 2020-07-22

## 2020-06-23 RX ORDER — METOPROLOL TARTRATE 50 MG
1 TABLET ORAL
Qty: 30 | Refills: 0
Start: 2020-06-23 | End: 2020-07-22

## 2020-06-23 RX ORDER — FUROSEMIDE 40 MG
1 TABLET ORAL
Qty: 0 | Refills: 0 | DISCHARGE

## 2020-06-23 RX ORDER — METOPROLOL TARTRATE 50 MG
1 TABLET ORAL
Qty: 0 | Refills: 0 | DISCHARGE

## 2020-06-23 RX ORDER — SACUBITRIL AND VALSARTAN 24; 26 MG/1; MG/1
1 TABLET, FILM COATED ORAL
Qty: 60 | Refills: 0
Start: 2020-06-23 | End: 2020-07-22

## 2020-06-23 RX ADMIN — SERTRALINE 50 MILLIGRAM(S): 25 TABLET, FILM COATED ORAL at 11:02

## 2020-06-23 RX ADMIN — IRON SUCROSE 110 MILLIGRAM(S): 20 INJECTION, SOLUTION INTRAVENOUS at 13:01

## 2020-06-23 RX ADMIN — Medication 667 MILLIGRAM(S): at 11:02

## 2020-06-23 RX ADMIN — CHLORHEXIDINE GLUCONATE 1 APPLICATION(S): 213 SOLUTION TOPICAL at 05:37

## 2020-06-23 RX ADMIN — Medication 1 DROP(S): at 05:36

## 2020-06-23 RX ADMIN — Medication 1 DROP(S): at 17:44

## 2020-06-23 RX ADMIN — APIXABAN 2.5 MILLIGRAM(S): 2.5 TABLET, FILM COATED ORAL at 17:38

## 2020-06-23 RX ADMIN — Medication 1 DROP(S): at 11:02

## 2020-06-23 RX ADMIN — Medication 50 MILLIGRAM(S): at 05:37

## 2020-06-23 RX ADMIN — PANTOPRAZOLE SODIUM 40 MILLIGRAM(S): 20 TABLET, DELAYED RELEASE ORAL at 05:37

## 2020-06-23 RX ADMIN — Medication 1 MILLIGRAM(S): at 11:02

## 2020-06-23 RX ADMIN — SACUBITRIL AND VALSARTAN 1 TABLET(S): 24; 26 TABLET, FILM COATED ORAL at 05:36

## 2020-06-23 RX ADMIN — Medication 20 MILLIGRAM(S): at 05:37

## 2020-06-23 RX ADMIN — APIXABAN 2.5 MILLIGRAM(S): 2.5 TABLET, FILM COATED ORAL at 05:38

## 2020-06-23 RX ADMIN — Medication 81 MILLIGRAM(S): at 10:59

## 2020-06-23 RX ADMIN — Medication 40 MILLIGRAM(S): at 05:36

## 2020-06-23 RX ADMIN — SACUBITRIL AND VALSARTAN 1 TABLET(S): 24; 26 TABLET, FILM COATED ORAL at 17:38

## 2020-06-23 NOTE — DISCHARGE NOTE PROVIDER - NSDCCPCAREPLAN_GEN_ALL_CORE_FT
PRINCIPAL DISCHARGE DIAGNOSIS  Diagnosis: Nonischemic cardiomyopathy  Assessment and Plan of Treatment: Your symptoms are likely caused by your underlying heart disease. You did not have any heart attack as your stress test was normal. Your echocardiogram showed depressed heart contraction. We have adjust some of your medication so you will need to take the new medication we prescribed and follow up with your cardiologist and EP Dr. Baez outpatient.

## 2020-06-23 NOTE — DISCHARGE NOTE PROVIDER - HOSPITAL COURSE
The patient is an 84 year-old female with a PMH of DVT/PE (on Eliquis), HTN, early glaucoma, DLD, HFpEF (last EF 53% 2/2020), s/p PPM, CKD III/IV, gout, multiple caths (neg), stress test (neg per patient), rheumatoid arthritis, presenting for chest pressure and shortness of breath. The patient reports last night she began experiencing chest pressure on the left side of her chest, initially radiating to the right side of her chest but which is now nonradiating, occurring intermittently 5 mins on/off. She reports the episodes have been continual since last night, not related to exertion, and have been associated with heaviness of breathing which occurs with each episode. Denies lightheadedness but reports chronic dizziness which she attributes to calcium acetate started in lieu of calcitriol (patient does not know why it was switched); denies abdominal pain, nausea, vomiting, diarrhea, blood in the stool or black stool, significant unintended weight loss. She also reports 2 weeks of burning on urination for which she was given an antibiotic with no relief. She lives at home with 3 of her children, all of whom are healthy; she ambulates w/ a cane at baseline, sometimes w/ a walker.        #acute exacerbation of HFrEF, nonischemic cardiomyopathy    S/p multiple cardiac caths, stress test in past, negative    Nuclear stress test negative 6/22    EKG showing atrial sensed ventricular paced rhythm    CXR did reveal no evidence of pleural effusion + stable cardiomegaly    ECHO 2/2020 EF 53%; 6/21 EF 20-25%, Mild MR, Moderate TR, severe pulmonary HTN    Continue Lasix 20mg QD, Toprol 50mg, and Entresto 24/26 BID    c/w aspirin and statins.     Outpatient follow up with Cardiology.    EP following, will need to follow up with Dr. Baez in a month for battery change for ICD        #Rheumatoid Arthritis    - No signs of acute flare        #Anemia, likely chronic secondary to CKD     Stable, trend CBCs, transfuse Hb < 8        # HTN, stable    /74 on this admission    C/w metoprolol succinate 50 mg PO once daily        # H/o DVT, PE, stable    C/w Eliquis 2.5 mg PO BID        # CKD, at baseline    C/w calcium acetate 1 tab once daily        # DLD    C/w atorvastatin 40 mg PO once daily        # Glaucoma, stable    C/w timolol, latanoprost, Systane gtt as ordered

## 2020-06-23 NOTE — PROGRESS NOTE ADULT - REASON FOR ADMISSION
shortness of breath, troponinemia

## 2020-06-23 NOTE — DISCHARGE NOTE PROVIDER - CARE PROVIDER_API CALL
Sterling Mckinney)  Cardiovascular Disease; Internal Medicine  [address] [place] [address]  Phone: [phone]  Fax: [phone]  Established Patient  Follow Up Time: 1 week    Tasha Baez  CARDIAC ELECTROPHYSIOLOGY  [address] [place] [id number]  Phone: [phone]  Fax: [phone]  Established Patient  Follow Up Time: 1 month    Wade Celeste  INTERNAL MEDICINE  [address] [place] [address]  Phone: [phone]  Fax: [phone]  Established Patient  Follow Up Time: 1 week

## 2020-06-23 NOTE — DISCHARGE NOTE PROVIDER - NSDCMRMEDTOKEN_GEN_ALL_CORE_FT
apixaban 2.5 mg oral tablet: 1 tab(s) orally 2 times a day  aspirin 81 mg oral tablet, chewable: 1 tab(s) orally once a day  atorvastatin 40 mg oral tablet: 1 tab(s) orally once a day  calcium acetate 667 mg oral tablet: 1 tab(s) orally once a day  folic acid 1 mg oral tablet: 1 tab(s) orally once a day  furosemide 20 mg oral tablet: 1 tab(s) orally once a day  latanoprost 0.005% ophthalmic solution: 1 drop(s) to each affected eye once a day (in the evening)  pantoprazole 40 mg oral delayed release tablet: 1 tab(s) orally once a day (before a meal)  sertraline 50 mg oral tablet: 1 tab(s) orally once a day  Systane Ultra ophthalmic solution: 1 drop(s) to each affected eye once a day  timolol maleate 0.5% ophthalmic solution: 1 drop(s) to each affected eye 2 times a day apixaban 2.5 mg oral tablet: 1 tab(s) orally 2 times a day  aspirin 81 mg oral tablet, chewable: 1 tab(s) orally once a day  atorvastatin 40 mg oral tablet: 1 tab(s) orally once a day  calcium acetate 667 mg oral tablet: 1 tab(s) orally once a day  Entresto 24 mg-26 mg oral tablet: 1 tab(s) orally 2 times a day  ferrous sulfate 325 mg (65 mg elemental iron) oral tablet: 1 tab(s) orally once a day   folic acid 1 mg oral tablet: 1 tab(s) orally once a day  furosemide 20 mg oral tablet: 1 tab(s) orally Tuesday, Thursday, Saturday, Sunday  latanoprost 0.005% ophthalmic solution: 1 drop(s) to each affected eye once a day (in the evening)  Metoprolol Succinate ER 50 mg oral tablet, extended release: 1 tab(s) orally once a day  pantoprazole 40 mg oral delayed release tablet: 1 tab(s) orally once a day (before a meal)  sertraline 50 mg oral tablet: 1 tab(s) orally once a day  Systane Ultra ophthalmic solution: 1 drop(s) to each affected eye once a day  timolol maleate 0.5% ophthalmic solution: 1 drop(s) to each affected eye 2 times a day

## 2020-06-23 NOTE — PROGRESS NOTE ADULT - ASSESSMENT
The patient is an 84 year-old female with a PMH of DVT/PE (on Eliquis), HTN, early glaucoma, DLD, HFpEF (last EF 53% 2/2020), s/p PPM, CKD III/IV, gout, multiple caths (neg), stress test (neg per patient), rheumatoid arthritis, presenting for chest pressure and shortness of breath. She also reports 2 weeks of burning on urination for which she was given an antibiotic with no relief.    1.	CP - musculoskeletal  2.	Non ischemic CM  3.	H/O Ch. DVT/PE on Eliquis  4.	HTN / DL  5.	Ch. HFrEF  6.	CKD- IV   7.	Gout  8.	Normocytic Anemia          PLAN:    ·	Stress test is negative for ischemia.   ·	Care D/W the cardiology on bedside. Recommended to cont medical management and out pt F/U  ·	ECHO showed EF is 20-25% and severe pulmonary HTN  ·	CE x 4 are negative.   ·	Device interrogation noted. one second of NSVT and three episodes of AT.   ·	Low salt diet and water restriction to 1.5 L/D  ·	Low H/H. Iron studies noted. Serum Iron is 17 and percent sat is 9. Pt's Hb is stable. Out pt F/U with GI. Will give her Venofer 200 mg iv prior to D/C  ·	Cont current meds. The patient is an 84 year-old female with a PMH of DVT/PE (on Eliquis), HTN, early glaucoma, DLD, HFpEF (last EF 53% 2/2020), s/p PPM, CKD III/IV, gout, multiple caths (neg), stress test (neg per patient), rheumatoid arthritis, presenting for chest pressure and shortness of breath. She also reports 2 weeks of burning on urination for which she was given an antibiotic with no relief.    1.	CP - musculoskeletal  2.	Non ischemic CM  3.	H/O Ch. DVT/PE on Eliquis  4.	HTN / DL  5.	Ch. HFrEF  6.	CKD- IV   7.	Gout  8.	Normocytic Anemia          PLAN:    ·	Stress test is negative for ischemia.   ·	Care D/W the cardiology on bedside. Recommended to cont medical management and out pt F/U  ·	ECHO showed EF is 20-25% and severe pulmonary HTN  ·	Increased the Metoprolol succinate to 50 mg po daily and also started her on Entresto 24/26 mg po twice daily  ·	Change Lasix back to 20 mg po three times a week  ·	CE x 4 are negative.   ·	Device interrogation noted. One second of NSVT and three episodes of AT.   ·	Low salt diet and water restriction to 1.5 L/D  ·	Low H/H. Iron studies noted. Serum Iron is 17 and percent sat is 9. Pt's Hb is stable. Out pt F/U with GI. Will give her Venofer 200 mg iv prior to D/C  ·	Cont current meds.   ·	Called pt's daughter Shanelle Marrufo and discussed pt care with her. Informed her that her mother is being discharged home today.     * Med rec reviewed. Plan of care D/W the pt and her daughter on telephone. Time spent 41 minutes.

## 2020-06-23 NOTE — PROGRESS NOTE ADULT - SUBJECTIVE AND OBJECTIVE BOX
RACHEL SUMMERS  84y Female    CHIEF COMPLAINT:    Patient is a 84y old  Female who presents with a chief complaint of shortness of breath, troponinemia (2020 19:58)      INTERVAL HPI/OVERNIGHT EVENTS:    Patient seen and examined.    ROS: All other systems are negative.    Vital Signs:    T(F): 96.5 (20 @ 05:00), Max: 98.1 (20 @ 20:06)  HR: 60 (20 @ 05:00) (60 - 62)  BP: 121/66 (20 @ 05:00) (120/66 - 143/60)  RR: 18 (20 @ 05:00) (16 - 18)  SpO2: --  I&O's Summary    2020 07:  -  2020 07:00  --------------------------------------------------------  IN: 80 mL / OUT: 0 mL / NET: 80 mL    2020 07:01  -  2020 11:40  --------------------------------------------------------  IN: 210 mL / OUT: 0 mL / NET: 210 mL      Daily     Daily Weight in k.9 (2020 05:00)  CAPILLARY BLOOD GLUCOSE          PHYSICAL EXAM:    GENERAL:  NAD  SKIN: No rashes or lesions  HENT: Atrumatic. Normocephalic. PERRL. Moist membranes.  NECK: Supple, No JVD. No lymphadenopathy.  PULMONARY: CTA B/L. No wheezing. No rales  CVS: Normal S1, S2. Rate and Rythm are regular. No murmurs.  ABDOMEN/GI: Soft, Nontender, Nondistended; BS present  EXTREMITIES: Peripheral pulses intact. No edema B/L LE.  NEUROLOGIC:  No motor or sensory deficit.  PSYCH: Alert & oriented x 3    Consultant(s) Notes Reviewed:  [x ] YES  [ ] NO  Care Discussed with Consultants/Other Providers [ x] YES  [ ] NO    EKG reviewed  Telemetry reviewed    LABS:                        9.3    12.49 )-----------( 193      ( 2020 07:03 )             28.7         136  |  101  |  57<H>  ----------------------------<  132<H>  3.9   |  20  |  2.5<H>    Ca    8.1<L>      2020 07:03  Mg     2.1         TPro  6.1  /  Alb  3.1<L>  /  TBili  0.2  /  DBili  x   /  AST  33  /  ALT  21  /  AlkPhos  123<H>        Serum Pro-Brain Natriuretic Peptide: 44052 pg/mL (20 @ 06:02)    Trop 0.04, CKMB --, CK --, 20 @ 06:02  Trop 0.04, CKMB --, CK --, 20 @ 20:46  Trop 0.04, CKMB --, CK --, 20 @ 18:25  Trop 0.04, CKMB --, CK --, 20 @ 12:25        RADIOLOGY & ADDITIONAL TESTS:    < from: NM Nuclear Stress Pharmacologic Multiple (20 @ 11:26) >  Impression:   1. NORMAL ADENOSINE / REST MYOCARDIAL PERFUSION SPECT TOMOGRAPHY, WITH NO EVIDENCE FOR ISCHEMIA DURING ADENOSINE INFUSION.   2. NORMAL RESTING LEFT VENTRICULAR WALL MOTION AND WALL THICKENING.  3. LEFT VENTRICULAR EJECTION FRACTION OF  56 % WHICH IS WITHIN RANGE OF NORMAL.     < end of copied text >    Imaging or report Personally Reviewed:  [ ] YES  [ ] NO    Medications:  Standing  apixaban 2.5 milliGRAM(s) Oral every 12 hours  aspirin  chewable 81 milliGRAM(s) Oral daily  atorvastatin 40 milliGRAM(s) Oral at bedtime  calcium acetate 667 milliGRAM(s) Oral daily  chlorhexidine 4% Liquid 1 Application(s) Topical <User Schedule>  folic acid 1 milliGRAM(s) Oral daily  furosemide    Tablet 20 milliGRAM(s) Oral daily  latanoprost 0.005% Ophthalmic Solution 1 Drop(s) Both EYES at bedtime  metoprolol succinate ER 50 milliGRAM(s) Oral daily  pantoprazole    Tablet 40 milliGRAM(s) Oral before breakfast  polyvinyl alcohol 1.4%/povidone 0.6% Ophthalmic Solution - Peds 1 Drop(s) Both EYES daily  predniSONE   Tablet 40 milliGRAM(s) Oral daily  sacubitril 24 mG/valsartan 26 mG 1 Tablet(s) Oral two times a day  sertraline 50 milliGRAM(s) Oral daily  timolol 0.5% Solution 1 Drop(s) Both EYES two times a day    PRN Meds  acetaminophen   Tablet .. 650 milliGRAM(s) Oral every 6 hours PRN  nitroglycerin     SubLingual 0.4 milliGRAM(s) SubLingual every 5 minutes PRN  traMADol 50 milliGRAM(s) Oral every 8 hours PRN      Case discussed with resident    Care discussed with pt/family RACHEL SUMMERS  84y Female    CHIEF COMPLAINT:    Patient is a 84y old  Female who presents with a chief complaint of shortness of breath, troponinemia (2020 19:58)      INTERVAL HPI/OVERNIGHT EVENTS:    Patient seen and examined. No cp. No sob. No H/O melena or rectal bleeding    ROS: All other systems are negative.    Vital Signs:    T(F): 96.5 (20 @ 05:00), Max: 98.1 (20 @ 20:06)  HR: 60 (20 @ 05:00) (60 - 62)  BP: 121/66 (20 @ 05:00) (120/66 - 143/60)  RR: 18 (20 @ 05:00) (16 - 18)  SpO2: --  I&O's Summary    2020 07:  -  2020 07:00  --------------------------------------------------------  IN: 80 mL / OUT: 0 mL / NET: 80 mL    2020 07:  -  2020 11:40  --------------------------------------------------------  IN: 210 mL / OUT: 0 mL / NET: 210 mL      Daily     Daily Weight in k.9 (2020 05:00)  CAPILLARY BLOOD GLUCOSE          PHYSICAL EXAM:    GENERAL:  NAD  SKIN: No rashes or lesions  HENT: Atraumatic Normocephalic. PERRL. Moist membranes.  NECK: Supple, No JVD. No lymphadenopathy.  PULMONARY: CTA B/L. No wheezing. No rales  CVS: Normal S1, S2. Rate and Rhythm are regular. No murmurs.  ABDOMEN/GI: Soft, Nontender, Nondistended; BS present  EXTREMITIES: Peripheral pulses intact. No edema B/L LE.  NEUROLOGIC:  No motor or sensory deficit.  PSYCH: Alert & oriented x 3    Consultant(s) Notes Reviewed:  [x ] YES  [ ] NO  Care Discussed with Consultants/Other Providers [ x] YES  [ ] NO    EKG reviewed  Telemetry reviewed    LABS:                        9.3    12.49 )-----------( 193      ( 2020 07:03 )             28.7         136  |  101  |  57<H>  ----------------------------<  132<H>  Creatinine Trend: 2.5<--, 2.0<--, 2.1<--, 2.2<--, 2.2<--  3.9   |  20  |  2.5<H>    Ca    8.1<L>      2020 07:03  Mg     2.1         TPro  6.1  /  Alb  3.1<L>  /  TBili  0.2  /  DBili  x   /  AST  33  /  ALT  21  /  AlkPhos  123<H>        Serum Pro-Brain Natriuretic Peptide: 94980 pg/mL (20 @ 06:02)    Trop 0.04, CKMB --, CK --, 20 @ 06:02  Trop 0.04, CKMB --, CK --, 20 @ 20:46  Trop 0.04, CKMB --, CK --, 20 @ 18:25  Trop 0.04, CKMB --, CK --, 20 @ 12:25        RADIOLOGY & ADDITIONAL TESTS:    < from: NM Nuclear Stress Pharmacologic Multiple (20 @ 11:26) >  Impression:   1. NORMAL ADENOSINE / REST MYOCARDIAL PERFUSION SPECT TOMOGRAPHY, WITH NO EVIDENCE FOR ISCHEMIA DURING ADENOSINE INFUSION.   2. NORMAL RESTING LEFT VENTRICULAR WALL MOTION AND WALL THICKENING.  3. LEFT VENTRICULAR EJECTION FRACTION OF  56 % WHICH IS WITHIN RANGE OF NORMAL.     < end of copied text >    Imaging or report Personally Reviewed:  [ ] YES  [ ] NO    Medications:  Standing  apixaban 2.5 milliGRAM(s) Oral every 12 hours  aspirin  chewable 81 milliGRAM(s) Oral daily  atorvastatin 40 milliGRAM(s) Oral at bedtime  calcium acetate 667 milliGRAM(s) Oral daily  chlorhexidine 4% Liquid 1 Application(s) Topical <User Schedule>  folic acid 1 milliGRAM(s) Oral daily  furosemide    Tablet 20 milliGRAM(s) Oral daily  latanoprost 0.005% Ophthalmic Solution 1 Drop(s) Both EYES at bedtime  metoprolol succinate ER 50 milliGRAM(s) Oral daily  pantoprazole    Tablet 40 milliGRAM(s) Oral before breakfast  polyvinyl alcohol 1.4%/povidone 0.6% Ophthalmic Solution - Peds 1 Drop(s) Both EYES daily  predniSONE   Tablet 40 milliGRAM(s) Oral daily  sacubitril 24 mG/valsartan 26 mG 1 Tablet(s) Oral two times a day  sertraline 50 milliGRAM(s) Oral daily  timolol 0.5% Solution 1 Drop(s) Both EYES two times a day    PRN Meds  acetaminophen   Tablet .. 650 milliGRAM(s) Oral every 6 hours PRN  nitroglycerin     SubLingual 0.4 milliGRAM(s) SubLingual every 5 minutes PRN  traMADol 50 milliGRAM(s) Oral every 8 hours PRN      Case discussed with resident    Care discussed with pt/family

## 2020-06-23 NOTE — DISCHARGE NOTE PROVIDER - CARE PROVIDERS DIRECT ADDRESSES
[email or web address],DirectAddress_Unknown,DirectAddress_Unknown

## 2020-06-23 NOTE — DISCHARGE NOTE PROVIDER - PROVIDER TOKENS
PROVIDER:[TOKEN:[[id number]],FOLLOWUP:[1 week],ESTABLISHEDPATIENT:[T]],PROVIDER:[TOKEN:[[id number]],FOLLOWUP:[1 month],ESTABLISHEDPATIENT:[T]],PROVIDER:[TOKEN:[[id number]],FOLLOWUP:[1 week],ESTABLISHEDPATIENT:[T]]

## 2020-06-25 DIAGNOSIS — Z86.718 PERSONAL HISTORY OF OTHER VENOUS THROMBOSIS AND EMBOLISM: ICD-10-CM

## 2020-06-25 DIAGNOSIS — D63.8 ANEMIA IN OTHER CHRONIC DISEASES CLASSIFIED ELSEWHERE: ICD-10-CM

## 2020-06-25 DIAGNOSIS — E87.6 HYPOKALEMIA: ICD-10-CM

## 2020-06-25 DIAGNOSIS — M06.9 RHEUMATOID ARTHRITIS, UNSPECIFIED: ICD-10-CM

## 2020-06-25 DIAGNOSIS — N18.4 CHRONIC KIDNEY DISEASE, STAGE 4 (SEVERE): ICD-10-CM

## 2020-06-25 DIAGNOSIS — I42.8 OTHER CARDIOMYOPATHIES: ICD-10-CM

## 2020-06-25 DIAGNOSIS — Z88.1 ALLERGY STATUS TO OTHER ANTIBIOTIC AGENTS STATUS: ICD-10-CM

## 2020-06-25 DIAGNOSIS — Z11.59 ENCOUNTER FOR SCREENING FOR OTHER VIRAL DISEASES: ICD-10-CM

## 2020-06-25 DIAGNOSIS — Z86.711 PERSONAL HISTORY OF PULMONARY EMBOLISM: ICD-10-CM

## 2020-06-25 DIAGNOSIS — M10.9 GOUT, UNSPECIFIED: ICD-10-CM

## 2020-06-25 DIAGNOSIS — I13.0 HYPERTENSIVE HEART AND CHRONIC KIDNEY DISEASE WITH HEART FAILURE AND STAGE 1 THROUGH STAGE 4 CHRONIC KIDNEY DISEASE, OR UNSPECIFIED CHRONIC KIDNEY DISEASE: ICD-10-CM

## 2020-06-25 DIAGNOSIS — I50.23 ACUTE ON CHRONIC SYSTOLIC (CONGESTIVE) HEART FAILURE: ICD-10-CM

## 2020-06-25 DIAGNOSIS — E78.5 HYPERLIPIDEMIA, UNSPECIFIED: ICD-10-CM

## 2020-06-25 DIAGNOSIS — Z95.0 PRESENCE OF CARDIAC PACEMAKER: ICD-10-CM

## 2020-06-25 DIAGNOSIS — I27.20 PULMONARY HYPERTENSION, UNSPECIFIED: ICD-10-CM

## 2020-06-25 DIAGNOSIS — Z79.01 LONG TERM (CURRENT) USE OF ANTICOAGULANTS: ICD-10-CM

## 2020-06-25 DIAGNOSIS — H40.9 UNSPECIFIED GLAUCOMA: ICD-10-CM

## 2020-06-25 DIAGNOSIS — Z79.82 LONG TERM (CURRENT) USE OF ASPIRIN: ICD-10-CM

## 2020-06-29 ENCOUNTER — APPOINTMENT (OUTPATIENT)
Dept: CARDIOLOGY | Facility: CLINIC | Age: 85
End: 2020-06-29
Payer: MEDICARE

## 2020-06-29 VITALS
HEART RATE: 60 BPM | WEIGHT: 144 LBS | DIASTOLIC BLOOD PRESSURE: 60 MMHG | BODY MASS INDEX: 25.52 KG/M2 | TEMPERATURE: 97.5 F | HEIGHT: 63 IN | SYSTOLIC BLOOD PRESSURE: 140 MMHG

## 2020-06-29 PROCEDURE — 99214 OFFICE O/P EST MOD 30 MIN: CPT

## 2020-06-29 PROCEDURE — 93000 ELECTROCARDIOGRAM COMPLETE: CPT

## 2020-06-29 RX ORDER — OMEPRAZOLE 40 MG/1
40 CAPSULE, DELAYED RELEASE ORAL
Qty: 90 | Refills: 3 | Status: DISCONTINUED | COMMUNITY
Start: 2018-05-21 | End: 2020-06-29

## 2020-06-29 RX ORDER — ISOSORBIDE MONONITRATE 30 MG/1
30 TABLET, EXTENDED RELEASE ORAL
Qty: 30 | Refills: 6 | Status: DISCONTINUED | COMMUNITY
Start: 2018-01-22 | End: 2020-06-29

## 2020-06-29 RX ORDER — CALCIUM ACETATE 667 MG/1
667 CAPSULE ORAL DAILY
Refills: 0 | Status: ACTIVE | COMMUNITY
Start: 2020-06-29

## 2020-06-29 RX ORDER — CHLORHEXIDINE GLUCONATE 4 %
325 (65 FE) LIQUID (ML) TOPICAL DAILY
Qty: 90 | Refills: 3 | Status: ACTIVE | COMMUNITY
Start: 2020-06-29

## 2020-06-29 RX ORDER — FUROSEMIDE 40 MG/1
40 TABLET ORAL DAILY
Qty: 90 | Refills: 3 | Status: DISCONTINUED | COMMUNITY
End: 2020-06-29

## 2020-06-29 RX ORDER — ATORVASTATIN CALCIUM 80 MG/1
80 TABLET, FILM COATED ORAL
Qty: 90 | Refills: 3 | Status: DISCONTINUED | COMMUNITY
Start: 2016-12-27 | End: 2020-06-29

## 2020-06-29 NOTE — PHYSICAL EXAM
[General Appearance - Well Developed] : well developed [Normal Appearance] : normal appearance [General Appearance - Well Nourished] : well nourished [Well Groomed] : well groomed [General Appearance - In No Acute Distress] : no acute distress [No Deformities] : no deformities [Normal Conjunctiva] : the conjunctiva exhibited no abnormalities [Eyelids - No Xanthelasma] : the eyelids demonstrated no xanthelasmas [No Oral Pallor] : no oral pallor [Normal Oral Mucosa] : normal oral mucosa [Normal Jugular Venous V Waves Present] : normal jugular venous V waves present [Normal Jugular Venous A Waves Present] : normal jugular venous A waves present [No Oral Cyanosis] : no oral cyanosis [No Jugular Venous Baez A Waves] : no jugular venous baez A waves [Exaggerated Use Of Accessory Muscles For Inspiration] : no accessory muscle use [Respiration, Rhythm And Depth] : normal respiratory rhythm and effort [Auscultation Breath Sounds / Voice Sounds] : lungs were clear to auscultation bilaterally [Heart Rate And Rhythm] : heart rate and rhythm were normal [Heart Sounds] : normal S1 and S2 [Abdomen Soft] : soft [Bowel Sounds] : normal bowel sounds [Murmurs] : no murmurs present [FreeTextEntry1] : severe pain in knees  difficult walking.  [Abdomen Mass (___ Cm)] : no abdominal mass palpated [Nail Clubbing] : no clubbing of the fingernails [Petechial Hemorrhages (___cm)] : no petechial hemorrhages [Cyanosis, Localized] : no localized cyanosis [Affect] : the affect was normal [] : no ischemic changes [Oriented To Time, Place, And Person] : oriented to person, place, and time [Mood] : the mood was normal [No Anxiety] : not feeling anxious [II] : a grade 2 [Normal Rate] : normal [1+] : left 1+ [No Pitting Edema] : no pitting edema present

## 2020-06-29 NOTE — REASON FOR VISIT
[Cardiomyopathy] : cardiomyopathy [Chest Pain] : chest pain [Follow-Up - Clinic] : a clinic follow-up of [Hypertension] : hypertension [Discharge Date: ___] : Discharge Date: [unfilled] [Hyperlipidemia] : hyperlipidemia [Follow-Up: _____] : a [unfilled] follow-up visit [Admitted for Heart Failure] : patient was not admitted for heart failure [Formal Caregiver] : formal caregiver

## 2020-06-29 NOTE — CONSULT LETTER
[Courtesy Letter:] : I had the pleasure of seeing your patient, [unfilled], in my office today. [Please see my note below.] : Please see my note below. [Dear  ___] : Dear  [unfilled], [Sincerely,] : Sincerely, [FreeTextEntry2] : Dr Sterling Mckinney\par Dr Wade Tao [Consult Closing:] : Thank you very much for allowing me to participate in the care of this patient.  If you have any questions, please do not hesitate to contact me.

## 2020-06-29 NOTE — REVIEW OF SYSTEMS
[Palpitations] : palpitations [Chest Pain] : chest pain [see HPI] : see HPI [Negative] : Psychiatric

## 2020-06-29 NOTE — HISTORY OF PRESENT ILLNESS
[FreeTextEntry1] : The patient was seen in [facility] with SOB . She was seen last week and this week. both time she was given lasix with improvement. Lasix was increased to 40 mg 3 days a qweek and 20 mg two days a week by her PCP

## 2020-06-29 NOTE — ASSESSMENT
[FreeTextEntry1] : The patient has had an admission for CP and SOB .  Had nuclear stress test which weas negative for ischemia . EF was normal by this modality . However echo showed EF of 20-25% ???. She has a known nonischemic CM but EF has not bee this low for a while. She does have CRT . The patient has usual hyperkalemia but it was low in the hosptial after Dr. Celeste had treated this . At this timie she clinically appears Euvolemic. She has had multiple caths in the past which was negative for significant CAD . There is a concern about her potassium levels on entesto.

## 2020-06-30 ENCOUNTER — INPATIENT (INPATIENT)
Facility: HOSPITAL | Age: 85
LOS: 4 days | Discharge: HOME HEALTH | End: 2020-07-05
Attending: INTERNAL MEDICINE | Admitting: INTERNAL MEDICINE
Payer: MEDICARE

## 2020-06-30 VITALS
WEIGHT: 143.08 LBS | HEART RATE: 75 BPM | DIASTOLIC BLOOD PRESSURE: 90 MMHG | OXYGEN SATURATION: 100 % | RESPIRATION RATE: 17 BRPM | SYSTOLIC BLOOD PRESSURE: 187 MMHG | TEMPERATURE: 100 F | HEIGHT: 63 IN

## 2020-06-30 DIAGNOSIS — Z88.1 ALLERGY STATUS TO OTHER ANTIBIOTIC AGENTS STATUS: ICD-10-CM

## 2020-06-30 DIAGNOSIS — Z90.49 ACQUIRED ABSENCE OF OTHER SPECIFIED PARTS OF DIGESTIVE TRACT: Chronic | ICD-10-CM

## 2020-06-30 DIAGNOSIS — K29.70 GASTRITIS, UNSPECIFIED, WITHOUT BLEEDING: ICD-10-CM

## 2020-06-30 DIAGNOSIS — D50.9 IRON DEFICIENCY ANEMIA, UNSPECIFIED: ICD-10-CM

## 2020-06-30 DIAGNOSIS — Z95.0 PRESENCE OF CARDIAC PACEMAKER: Chronic | ICD-10-CM

## 2020-06-30 DIAGNOSIS — Z90.89 ACQUIRED ABSENCE OF OTHER ORGANS: Chronic | ICD-10-CM

## 2020-06-30 DIAGNOSIS — Z90.710 ACQUIRED ABSENCE OF BOTH CERVIX AND UTERUS: Chronic | ICD-10-CM

## 2020-06-30 DIAGNOSIS — I50.42 CHRONIC COMBINED SYSTOLIC (CONGESTIVE) AND DIASTOLIC (CONGESTIVE) HEART FAILURE: ICD-10-CM

## 2020-06-30 DIAGNOSIS — Z79.82 LONG TERM (CURRENT) USE OF ASPIRIN: ICD-10-CM

## 2020-06-30 LAB
ALBUMIN SERPL ELPH-MCNC: 4 G/DL — SIGNIFICANT CHANGE UP (ref 3.5–5.2)
ALP SERPL-CCNC: 127 U/L — HIGH (ref 30–115)
ALT FLD-CCNC: 12 U/L — SIGNIFICANT CHANGE UP (ref 0–41)
ANION GAP SERPL CALC-SCNC: 13 MMOL/L — SIGNIFICANT CHANGE UP (ref 7–14)
APPEARANCE UR: CLEAR — SIGNIFICANT CHANGE UP
APTT BLD: 37.1 SEC — SIGNIFICANT CHANGE UP (ref 27–39.2)
AST SERPL-CCNC: 26 U/L — SIGNIFICANT CHANGE UP (ref 0–41)
BACTERIA # UR AUTO: NEGATIVE — SIGNIFICANT CHANGE UP
BASOPHILS # BLD AUTO: 0.06 K/UL — SIGNIFICANT CHANGE UP (ref 0–0.2)
BASOPHILS NFR BLD AUTO: 0.7 % — SIGNIFICANT CHANGE UP (ref 0–1)
BILIRUB SERPL-MCNC: 0.2 MG/DL — SIGNIFICANT CHANGE UP (ref 0.2–1.2)
BILIRUB UR-MCNC: NEGATIVE — SIGNIFICANT CHANGE UP
BLD GP AB SCN SERPL QL: SIGNIFICANT CHANGE UP
BUN SERPL-MCNC: 35 MG/DL — HIGH (ref 10–20)
CALCIUM SERPL-MCNC: 9.1 MG/DL — SIGNIFICANT CHANGE UP (ref 8.5–10.1)
CHLORIDE SERPL-SCNC: 105 MMOL/L — SIGNIFICANT CHANGE UP (ref 98–110)
CO2 SERPL-SCNC: 22 MMOL/L — SIGNIFICANT CHANGE UP (ref 17–32)
COLOR SPEC: SIGNIFICANT CHANGE UP
CREAT SERPL-MCNC: 2.2 MG/DL — HIGH (ref 0.7–1.5)
DIFF PNL FLD: NEGATIVE — SIGNIFICANT CHANGE UP
EOSINOPHIL # BLD AUTO: 0.49 K/UL — SIGNIFICANT CHANGE UP (ref 0–0.7)
EOSINOPHIL NFR BLD AUTO: 6.1 % — SIGNIFICANT CHANGE UP (ref 0–8)
EPI CELLS # UR: 1 /HPF — SIGNIFICANT CHANGE UP (ref 0–5)
GLUCOSE SERPL-MCNC: 96 MG/DL — SIGNIFICANT CHANGE UP (ref 70–99)
GLUCOSE UR QL: NEGATIVE — SIGNIFICANT CHANGE UP
HCT VFR BLD CALC: 27.9 % — LOW (ref 37–47)
HCT VFR BLD CALC: 31.4 % — LOW (ref 37–47)
HGB BLD-MCNC: 8.5 G/DL — LOW (ref 12–16)
HGB BLD-MCNC: 9.5 G/DL — LOW (ref 12–16)
HYALINE CASTS # UR AUTO: 0 /LPF — SIGNIFICANT CHANGE UP (ref 0–7)
IMM GRANULOCYTES NFR BLD AUTO: 1.1 % — HIGH (ref 0.1–0.3)
INR BLD: 1.43 RATIO — HIGH (ref 0.65–1.3)
IRON SATN MFR SERPL: 19 % — SIGNIFICANT CHANGE UP (ref 15–50)
IRON SATN MFR SERPL: 45 UG/DL — SIGNIFICANT CHANGE UP (ref 35–150)
KETONES UR-MCNC: NEGATIVE — SIGNIFICANT CHANGE UP
LACTATE SERPL-SCNC: 0.5 MMOL/L — LOW (ref 0.7–2)
LEUKOCYTE ESTERASE UR-ACNC: NEGATIVE — SIGNIFICANT CHANGE UP
LIDOCAIN IGE QN: 80 U/L — HIGH (ref 7–60)
LYMPHOCYTES # BLD AUTO: 2.65 K/UL — SIGNIFICANT CHANGE UP (ref 1.2–3.4)
LYMPHOCYTES # BLD AUTO: 33 % — SIGNIFICANT CHANGE UP (ref 20.5–51.1)
MAGNESIUM SERPL-MCNC: 2 MG/DL — SIGNIFICANT CHANGE UP (ref 1.8–2.4)
MCHC RBC-ENTMCNC: 28.1 PG — SIGNIFICANT CHANGE UP (ref 27–31)
MCHC RBC-ENTMCNC: 28.2 PG — SIGNIFICANT CHANGE UP (ref 27–31)
MCHC RBC-ENTMCNC: 30.3 G/DL — LOW (ref 32–37)
MCHC RBC-ENTMCNC: 30.5 G/DL — LOW (ref 32–37)
MCV RBC AUTO: 92.4 FL — SIGNIFICANT CHANGE UP (ref 81–99)
MCV RBC AUTO: 93.2 FL — SIGNIFICANT CHANGE UP (ref 81–99)
MONOCYTES # BLD AUTO: 1.1 K/UL — HIGH (ref 0.1–0.6)
MONOCYTES NFR BLD AUTO: 13.7 % — HIGH (ref 1.7–9.3)
NEUTROPHILS # BLD AUTO: 3.63 K/UL — SIGNIFICANT CHANGE UP (ref 1.4–6.5)
NEUTROPHILS NFR BLD AUTO: 45.4 % — SIGNIFICANT CHANGE UP (ref 42.2–75.2)
NITRITE UR-MCNC: NEGATIVE — SIGNIFICANT CHANGE UP
NRBC # BLD: 0 /100 WBCS — SIGNIFICANT CHANGE UP (ref 0–0)
NRBC # BLD: 0 /100 WBCS — SIGNIFICANT CHANGE UP (ref 0–0)
PH UR: 6.5 — SIGNIFICANT CHANGE UP (ref 5–8)
PLATELET # BLD AUTO: 313 K/UL — SIGNIFICANT CHANGE UP (ref 130–400)
PLATELET # BLD AUTO: 364 K/UL — SIGNIFICANT CHANGE UP (ref 130–400)
POTASSIUM SERPL-MCNC: 4.7 MMOL/L — SIGNIFICANT CHANGE UP (ref 3.5–5)
POTASSIUM SERPL-SCNC: 4.7 MMOL/L — SIGNIFICANT CHANGE UP (ref 3.5–5)
PROT SERPL-MCNC: 7.3 G/DL — SIGNIFICANT CHANGE UP (ref 6–8)
PROT UR-MCNC: ABNORMAL
PROTHROM AB SERPL-ACNC: 16.4 SEC — HIGH (ref 9.95–12.87)
RBC # BLD: 3.02 M/UL — LOW (ref 4.2–5.4)
RBC # BLD: 3.37 M/UL — LOW (ref 4.2–5.4)
RBC # FLD: 14.6 % — HIGH (ref 11.5–14.5)
RBC # FLD: 14.6 % — HIGH (ref 11.5–14.5)
RBC CASTS # UR COMP ASSIST: 1 /HPF — SIGNIFICANT CHANGE UP (ref 0–4)
SODIUM SERPL-SCNC: 140 MMOL/L — SIGNIFICANT CHANGE UP (ref 135–146)
SP GR SPEC: 1.01 — SIGNIFICANT CHANGE UP (ref 1.01–1.02)
TIBC SERPL-MCNC: 236 UG/DL — SIGNIFICANT CHANGE UP (ref 220–430)
TRANSFERRIN SERPL-MCNC: 203 MG/DL — SIGNIFICANT CHANGE UP (ref 200–360)
UIBC SERPL-MCNC: 191 UG/DL — SIGNIFICANT CHANGE UP (ref 110–370)
UROBILINOGEN FLD QL: SIGNIFICANT CHANGE UP
WBC # BLD: 8.02 K/UL — SIGNIFICANT CHANGE UP (ref 4.8–10.8)
WBC # BLD: 8.06 K/UL — SIGNIFICANT CHANGE UP (ref 4.8–10.8)
WBC # FLD AUTO: 8.02 K/UL — SIGNIFICANT CHANGE UP (ref 4.8–10.8)
WBC # FLD AUTO: 8.06 K/UL — SIGNIFICANT CHANGE UP (ref 4.8–10.8)
WBC UR QL: 1 /HPF — SIGNIFICANT CHANGE UP (ref 0–5)

## 2020-06-30 PROCEDURE — 93010 ELECTROCARDIOGRAM REPORT: CPT

## 2020-06-30 PROCEDURE — 36000 PLACE NEEDLE IN VEIN: CPT

## 2020-06-30 PROCEDURE — 74176 CT ABD & PELVIS W/O CONTRAST: CPT | Mod: 26

## 2020-06-30 PROCEDURE — 99285 EMERGENCY DEPT VISIT HI MDM: CPT | Mod: 25

## 2020-06-30 PROCEDURE — 76937 US GUIDE VASCULAR ACCESS: CPT | Mod: 26

## 2020-06-30 PROCEDURE — 71045 X-RAY EXAM CHEST 1 VIEW: CPT | Mod: 26

## 2020-06-30 RX ORDER — PANTOPRAZOLE SODIUM 20 MG/1
40 TABLET, DELAYED RELEASE ORAL
Refills: 0 | Status: DISCONTINUED | OUTPATIENT
Start: 2020-06-30 | End: 2020-07-05

## 2020-06-30 RX ORDER — FUROSEMIDE 40 MG
20 TABLET ORAL
Refills: 0 | Status: DISCONTINUED | OUTPATIENT
Start: 2020-06-30 | End: 2020-07-05

## 2020-06-30 RX ORDER — ASPIRIN/CALCIUM CARB/MAGNESIUM 324 MG
81 TABLET ORAL DAILY
Refills: 0 | Status: DISCONTINUED | OUTPATIENT
Start: 2020-06-30 | End: 2020-07-01

## 2020-06-30 RX ORDER — SERTRALINE 25 MG/1
50 TABLET, FILM COATED ORAL DAILY
Refills: 0 | Status: DISCONTINUED | OUTPATIENT
Start: 2020-06-30 | End: 2020-07-05

## 2020-06-30 RX ORDER — CALCIUM ACETATE 667 MG
667 TABLET ORAL DAILY
Refills: 0 | Status: DISCONTINUED | OUTPATIENT
Start: 2020-06-30 | End: 2020-07-05

## 2020-06-30 RX ORDER — SODIUM CHLORIDE 9 MG/ML
1000 INJECTION INTRAMUSCULAR; INTRAVENOUS; SUBCUTANEOUS
Refills: 0 | Status: DISCONTINUED | OUTPATIENT
Start: 2020-06-30 | End: 2020-07-01

## 2020-06-30 RX ORDER — CHLORHEXIDINE GLUCONATE 213 G/1000ML
1 SOLUTION TOPICAL
Refills: 0 | Status: DISCONTINUED | OUTPATIENT
Start: 2020-06-30 | End: 2020-07-05

## 2020-06-30 RX ORDER — METOPROLOL TARTRATE 50 MG
50 TABLET ORAL DAILY
Refills: 0 | Status: DISCONTINUED | OUTPATIENT
Start: 2020-06-30 | End: 2020-07-05

## 2020-06-30 RX ORDER — FOLIC ACID 0.8 MG
1 TABLET ORAL DAILY
Refills: 0 | Status: DISCONTINUED | OUTPATIENT
Start: 2020-06-30 | End: 2020-07-05

## 2020-06-30 RX ORDER — SACUBITRIL AND VALSARTAN 24; 26 MG/1; MG/1
1 TABLET, FILM COATED ORAL
Refills: 0 | Status: DISCONTINUED | OUTPATIENT
Start: 2020-06-30 | End: 2020-07-05

## 2020-06-30 RX ORDER — LATANOPROST 0.05 MG/ML
1 SOLUTION/ DROPS OPHTHALMIC; TOPICAL AT BEDTIME
Refills: 0 | Status: DISCONTINUED | OUTPATIENT
Start: 2020-06-30 | End: 2020-07-05

## 2020-06-30 RX ORDER — TIMOLOL 0.5 %
1 DROPS OPHTHALMIC (EYE)
Refills: 0 | Status: DISCONTINUED | OUTPATIENT
Start: 2020-06-30 | End: 2020-07-05

## 2020-06-30 RX ORDER — ATORVASTATIN CALCIUM 80 MG/1
40 TABLET, FILM COATED ORAL AT BEDTIME
Refills: 0 | Status: DISCONTINUED | OUTPATIENT
Start: 2020-06-30 | End: 2020-07-05

## 2020-06-30 RX ADMIN — SODIUM CHLORIDE 75 MILLILITER(S): 9 INJECTION INTRAMUSCULAR; INTRAVENOUS; SUBCUTANEOUS at 18:43

## 2020-06-30 RX ADMIN — LATANOPROST 1 DROP(S): 0.05 SOLUTION/ DROPS OPHTHALMIC; TOPICAL at 22:38

## 2020-06-30 RX ADMIN — PANTOPRAZOLE SODIUM 40 MILLIGRAM(S): 20 TABLET, DELAYED RELEASE ORAL at 22:10

## 2020-06-30 RX ADMIN — Medication 1 DROP(S): at 22:10

## 2020-06-30 RX ADMIN — ATORVASTATIN CALCIUM 40 MILLIGRAM(S): 80 TABLET, FILM COATED ORAL at 22:10

## 2020-06-30 NOTE — H&P ADULT - ATTENDING COMMENTS
I saw and evaluated the patient. I have reviewed and agree with the findings and plan of care as documented above in the resident’s note (unless indicated differently below). Any necessary changes were made in the body of the text. I saw and evaluated the patient. I have reviewed and agree with the findings and plan of care as documented above in the resident’s note (unless indicated differently below). Any necessary changes were made in the body of the text.    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 Coming in for black stools w/ red streaks. Onset: 5 days ago. Course: persistent. Precipitating factors: on apixaban; also reports abdominal discomfort and speculates that it "could be ulcers." Abdominal pain achy/cramping, mild to moderate and present in epigastrium. Also reports dysuria and left sided abdominal and flank discomfort (sharp and mild). Associated symptoms: generalized weakness and intermittent lightheadedness. No NSAID use. Apixaban as aforementioned. States that she had a colonoscopy in 01/2020 that showed diverticulosis. Reports having an EGD many years ago but is unsure and does not recall additional details. Was also started on FeSo4 by her PMD for anemia x 1 week.    ROS:  Constitutional: no fevers; no chills; +generalized weakness  Eyes: no conjunctivitis; no itching  ENT: no dysphagia; no odynophagia  CVS: no PND; no orthopnea; no chest pain  Resp: no SOB; no coughing  GI: no nausea; no vomiting; no diarrhea; +abd pain; +melenic stools  : +dysuria; no hematuria  MSK: no myalgias; no arthralgias  Skin: no rashes; no ulcers  Neuro: no focal weakness; no headache  All other systems reviewed and are negative    PMHx, home medications, SurHx, FHx and Social history as above in the corresponding sections of the note - reviewed and edited where appropriate    Exam:  Vitals: BP = 174/84; P = 60; T = 97.4; RR = 19; SpO2 > 95 on room air  General: appears stated age; cooperative  Eyes: anicteric sclera; moist conjunctiva w/ no lid lag; PERRL; EOMI  HENT: NC/AT; clear oropharynx w/ moist mucous membranes  Neck: supple w/ FROM; trachea midline  Lungs: no tachypnea, accessory muscle usage, wheezing or rhonci; basilar rales  CVS: RRR; S1 and S2 w/o MRGs  Abd: BS+; soft; mildly-tender to palpation; no masses or HSM  Ext: no peripheral edema; pulses 2+ b/l  Skin: normal temp, turgor and texture; no rashes, ulcers or nodules  Neuro: CN II-XII intact; str 5/5 throughout; sensation grossly intact – light touch/temp  Psych: appropriate affect; alert and oriented to person, place, time and situation    Labs significant for H&H 8.7/28.7, Fe saturation 19, BUN/Cr 35/2.2, alk phos 127, lipase 80  U/A w/ 100 mg/dL protein - otherwise unremarkable  Covid-19 PCR not detected  CT abd/pelvis: No stones or hydronephrosis. No acute abnormality in the abdomen and pelvis.  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  (4) Uncontrolled HTN  (5) Hx of HFrEF - mild pulmonary vascular congestion as noted on CXR  --- NICM (LVEF was 25% and pt is now s/p CRT-D)  (6) Hx of diverticulosis - lower suspicion for lower GI etiology at this time  (7) Hx of RA and gout - stable w/ no acute complaints    Plan:  (1) Monitor hemodynamics  (2) 2 large bore IV's  (3) Prompt resuscitative measures (w/o colloids) as needed  (4) Trend H&H q8hrly for now  (5) PPI   (6) GI evaluation (for endoscopy and intervention)  (7) Hold anti-coagulants  (8) Cardiology eval noted - recs appreciated  (9) Medications as dosed  (10) Supportive care  (11) Not d/c ready at this time:  --- Tentative planning for after possible GI intervention   --- & if H&H stable   --- Possibly in 48 hrs    Code status: full code    Patient is [employer] - no blood products in accordance w/ patient's expressed wishes

## 2020-06-30 NOTE — ED PROVIDER NOTE - ATTENDING CONTRIBUTION TO CARE
85 y/o female with h/o CHF, DVT on eliquis, s/p PPM, CKD, gout, in ER with c/o black stool.  Pt was admitted to hospital 1 week ago for CP/SOB, had neg nuclear stress test, found to be mildly anemic (hgb 8-10) and started on iron.  Pt states she's been having black stool since then and yesterday noted some streaks of blood on the stool. stopped taking iron 3 days ago.  felt a little lightheaded yesterday, but no syncope/near syncope.  no cp or worsening sob.  pt also c/o some L flank pain and dysuria which has been going on since before her admission. UA 1 week ago neg.  Was given abx by her PMD after d/c which she has been taking, states symptoms have improved a little, but still present. no f/c.  no n/v/d.    PE - nad, nc/at, eomi, perrl, op - clear, mmm, cta b/l, no w/r/r, rrr, abd- soft, nt/nd, nabs, no cvat, from x 4, no LE swelling/tenderness, A&O x 3, no focal neuro deficits.  -check labs, UA, t&s, CT abd 83 y/o female with h/o CHF, DVT on eliquis, s/p PPM, CKD, gout, in ER with c/o black stool.  Pt was admitted to hospital 1 week ago for CP/SOB, had neg nuclear stress test, found to be mildly anemic (hgb 8-10) and started on iron.  Pt states she's been having black stool since then and yesterday noted some streaks of blood on the stool. stopped taking iron 3 days ago.  felt a little lightheaded yesterday, but no syncope/near syncope.  no cp or worsening sob.  pt also c/o some L flank pain and dysuria which has been going on since before her admission. UA 1 week ago neg.  Was given abx by her PMD after d/c which she has been taking, states symptoms have improved a little, but still present. no f/c.  no n/v/d.  pt denies h/o GI bleed.  Pt is a Hindu and refuses any blood transfusion.   PE - nad, nc/at, eomi, perrl, op - clear, mmm, cta b/l, no w/r/r, rrr, abd- soft, nt/nd, nabs, no cvat, from x 4, no LE swelling/tenderness, A&O x 3, no focal neuro deficits.  -check labs, UA, t&s, CT abd

## 2020-06-30 NOTE — H&P ADULT - HISTORY OF PRESENT ILLNESS
83y/o F with PMH of PE/DVT on Eliquis, DLD, HFpEF (53%) s/p PPM, gout, HTN, CKD3-4 presents to ED after having black and bloody stools for 5 days. Patient noted black stools starting on June 25th, and since 6/29 had x2 episode of bright red blood streaked BMs. Last BM was 6/30. Has had associated lower abd pain, radiating to L flank for 2wks - but mostly associated with full bladder and relieved after urinates. Pain is mild and not a/w food intake. Does endorse dysuria. Today had sharper L flank pain in AM as well. Recently pt started on iron supplements but stopped 3d ago. No other CP, SOB, fever/chills, nausea/vomiting/diarrhea, back pain, weakness/dizziness, headaches.     In ED: Tmax 99.5, HR 64, /65, RR18, SpO2 98%. DAPHNE showed brown stools  Labs: Hbg 9.3 (@ baseline), INR 1.43, Cr 2.2 (@ baseline). UA neg for infection or blood.   CT abd/pelvis neg for stones or signs of infection.   Admit for Hbg monitoring. 85y/o F with PMH of PE/DVT on Eliquis, DLD, HFpEF (53%) s/p PPM, gout, HTN, CKD3-4 presents to ED after having black and bloody stools for 5 days. Patient noted black stools starting on June 25th, and since 6/29 had x2 episode of bright red blood streaked BMs. Last BM was 6/30. Has had associated lower abd pain, radiating to L flank for 2wks - but mostly associated with full bladder and relieved after urinates. Pain is mild and not a/w food intake. Does endorse dysuria. Today had sharper L flank pain in AM as well. Recently pt started on iron supplements 6/23 but stopped 3d ago. She admits to some inc weakness and mild dizziness since this started. No other CP, SOB, fever/chills, nausea/vomiting/diarrhea, back pain, headaches.     *** PATIENT IS A JEHOVA'S WITNESS - REFUSES ANY BLOOD PRODUCTS EVEN IF RISK OF DEATH ***    In ED: Tmax 99.5, HR 64, /65, RR18, SpO2 98%. DAPHNE showed brown stools  Labs: Hbg 9.3 (@ baseline), INR 1.43, Cr 2.2 (@ baseline). UA neg for infection or blood.   CT abd/pelvis neg for stones or signs of infection.   Admit for Hbg monitoring. 85y/o F with PMH of PE/DVT on Eliquis, DLD, HFpEF (53%) s/p PPM, gout, HTN, CKD3-4 presents to ED after having black and bloody stools for 5 days. Patient noted black stools starting on June 25th, and since 6/29 had x2 episode of bright red blood streaked BMs. Last BM was 6/30. Has had associated lower abd pain, radiating to L flank for 2wks - but mostly associated with full bladder and relieved after urinates. Pain is mild and not a/w food intake. Does endorse dysuria. Today had sharper L flank pain in AM as well. Recently pt started on iron supplements 6/23 but stopped 3d ago. She admits to some inc weakness and mild dizziness since this started. No other CP, SOB, fever/chills, nausea/vomiting/diarrhea, back pain, headaches. Denies NSAID use. States had colonoscopy in [place] in January that showed diverticulosis but nothing else she can remember. Never had EGD.    *** PT IS A JEHOVA'S WITNESS - REFUSES ANY BLOOD PRODUCTS EVEN IF RISK OF DEATH ***    In ED: Tmax 99.5, HR 64, /65, RR18, SpO2 98%. DAPHNE showed brown stools  Labs: Hbg 9.3 (@ baseline), INR 1.43, Cr 2.2 (@ baseline). UA neg for infection or blood.   CT abd/pelvis neg for stones or signs of infection.   Admit for Hbg monitoring.

## 2020-06-30 NOTE — H&P ADULT - ASSESSMENT
83y/o F with CHFpEF, DVT/PE on eliquis, HTN, CKD, hx of anemia presents to ED with possible melena and bloody stools.     #R/o Upper GI bleed vs. hemorrhoids  - DDX: melena more consistent w/ UGI but bright red blood possibly hemorrhoids? vs. PO iron effect  - Hbg stable @ 9.5   - DAPHNE neg  - hold PO iron & eliquis (no NSAIDs)  - protonix IV BID  - NPO, IV 18 x2  - IVF @ 75cc  - CBC @ 8pm and AM  - GI consult    #CKD3-4  - Cr @ baseline at 2.2, monitor and avoid nephrotoxins    #Hx of PE/DVT  - hold eliquis for now    #CHFpEF- c/w lasix TThSaSu, toprol 50, entrestor 24-26, asa 81  #HDL- c/w atorvastatin 40  #Glaucoma- c/w eye drops  #Likely folic acid def- c/w supplement    #MISC  - DVT ppx: SCD  - GI ppx: PPI BID  - Diet: NPO  - Activity: as tolerated  - Dispo: acute, if Hbg stable tmw, possible dc home 85y/o F with CHFpEF, DVT/PE on eliquis, HTN, CKD, hx of anemia presents to ED with possible melena and bloody stools.     #R/o Upper GI bleed vs. hemorrhoids  - DDX: melena more consistent w/ UGI but bright red blood possibly hemorrhoids? vs. PO iron effect  **** NO BLOOD TRANSFUSIONS AS PER PT WISHES, JEHOVA'S WITNESS ****  - Hbg stable @ 9.5   - DAPHNE neg in ED  - hold PO iron & eliquis (no NSAIDs)  - protonix IV BID  - NPO, IV 18 x2  - IVF @ 75cc  - CBC @ 8pm and AM  - GI consult  - Cardio clearance     #CKD3-4  - Cr @ baseline at 2.2, monitor and avoid nephrotoxins    #Hx of PE/DVT  - hold eliquis for now    #CHFpEF- c/w lasix TThSaSu, toprol 50, entrestor 24-26, asa 81  #HDL- c/w atorvastatin 40  #Glaucoma- c/w eye drops  #Likely folic acid def- c/w supplement    #MISC  - DVT ppx: SCD  - GI ppx: PPI BID  - Diet: NPO  - Activity: as tolerated  - Dispo: acute, if Hbg stable tmw, possible dc home

## 2020-06-30 NOTE — ED ADULT NURSE NOTE - NSIMPLEMENTINTERV_GEN_ALL_ED
Implemented All Fall with Harm Risk Interventions:  [place] to call system. Call bell, personal items and telephone within reach. Instruct patient to call for assistance. Room bathroom lighting operational. Non-slip footwear when patient is off stretcher. Physically safe environment: no spills, clutter or unnecessary equipment. Stretcher in lowest position, wheels locked, appropriate side rails in place. Provide visual cue, wrist band, yellow gown, etc. Monitor gait and stability. Monitor for mental status changes and reorient to person, place, and time. Review medications for side effects contributing to fall risk. Reinforce activity limits and safety measures with patient and family. Provide visual clues: red socks.

## 2020-06-30 NOTE — ED PROVIDER NOTE - NS ED ROS FT
Review of Systems:  	•	CONSTITUTIONAL - no fever, no diaphoresis, no chills  	•	SKIN - no rash  	•	HEMATOLOGIC - no bleeding, no bruising  	•	EYES - no eye pain, no blurry vision  	•	ENT - no congestion  	•	RESPIRATORY - no shortness of breath, no cough  	•	CARDIAC - no chest pain, no palpitations  	•	GI - +black stools, +bloody stools, + abd pain, +flank pain, no nausea, no vomiting, no diarrhea, no constipation  	•	GENITO-URINARY - + dysuria; no hematuria, no increased urinary frequency  	•	MUSCULOSKELETAL - no joint paint, no swelling, no redness  	•	NEUROLOGIC - no weakness, no headache, no paresthesias, no LOC  	•	PSYCH - no anxiety, no depression  	All other ROS are negative except as documented in HPI.

## 2020-06-30 NOTE — H&P ADULT - NSHPLABSRESULTS_GEN_ALL_CORE
< from: CT Abdomen and Pelvis No Cont (06.30.20 @ 14:38) >      IMPRESSION:     No stones or hydronephrosis. No acute abnormality in the abdomen and pelvis.    < end of copied text >

## 2020-06-30 NOTE — ED PROVIDER NOTE - OBJECTIVE STATEMENT
83 yo F with pmhx of DVT/PE (on Eliquis), HTN, early glaucoma, DLD, HFpEF (last EF 53% 2/2020), s/p PPM, CKD III/IV, gout, arthritis presenting with black tarry stools since 6/25/20 along with 2 bright red blood streaks noted with bowel movement since yesterday. Last bowel movement this morning. Patient reports lower abdominal pain associated with left flank pain x 2 weeks. Reports lower abdominal pain when bladder is full and resolved when bladder is empties. Reports associated dysuria. Also reports sharp left flank pain that worsened this morning. Patient states that she stopped taking iron 3 days ago due to the black stools. Symptoms are moderate. No alleviating/aggravating factors. No cp, sob, fever, chills, nausea, vomiting, diarrhea, back pain, headache, dizziness,  paresthesias, or weakness.

## 2020-06-30 NOTE — ED PROVIDER NOTE - PHYSICAL EXAMINATION
VITAL SIGNS: I have reviewed nursing notes and confirm.  CONSTITUTIONAL: Well-developed; well-nourished; in no acute distress.  SKIN: Skin exam is warm and dry, no acute rash.  HEAD: Normocephalic; atraumatic.  EYES: PERRL, EOM intact; conjunctiva and sclera clear.  ENT: No nasal discharge; airway clear.   NECK: Supple; non tender.  CARD: S1, S2 normal; no murmurs, gallops, or rubs. Regular rate and rhythm.  RESP: Clear to auscultation bilaterally. No wheezes, rales or rhonchi.  ABD: Normal bowel sounds; soft; non-distended; non-tender. +Left CVA tenderness.   RECTAL chaperoned by nurse Chester: Small speck of brown stool noted. No melena. No blood.   EXT: Normal ROM. No edema.  LYMPH: No acute cervical adenopathy.  NEURO: Alert, oriented. Grossly unremarkable. No focal deficits.  PSYCH: Cooperative, appropriate.

## 2020-06-30 NOTE — ED PROVIDER NOTE - CLINICAL SUMMARY MEDICAL DECISION MAKING FREE TEXT BOX
Pt with h/o CHF, CKD, DVT on eliquis, s/p PPM, in ER with c/o black stools x 1 week and also blood streaked stool x 1 day.  Pt has been on iron for 1 week.  also c/o 2 weeks of back pain and urinary symptoms, on po abx.  no gross blood on rectal exam, + brown stool.  hgb 9.5 - was 9.3 on admission last week.  ua neg.  creat at baseline.  CT abd - no acute pathology. VS - not tachy, BP ok.  18g iv x 2 in place.  will admit for serial CBC given pt on eliquis. Pt with h/o CHF, CKD, DVT on eliquis, s/p PPM, in ER with c/o black stools x 1 week and also blood streaked stool x 1 day.  Pt has been on iron for 1 week.  also c/o 2 weeks of back pain and urinary symptoms, on po abx.  no gross blood on rectal exam, + brown stool.  hgb 9.5 - was 9.3 on admission last week.  ua neg.  creat at baseline.  CT abd - no acute pathology. VS - not tachy, BP ok.  18g iv x 2 in place.  ? GI bleed vs. black stool 2/2 recent iron use. will admit for serial CBC given pt on eliquis.  Pt is Baptism, refuses blood transfusion.

## 2020-06-30 NOTE — ED ADULT NURSE NOTE - PSH
Artificial pacemaker    History of appendectomy    History of hysterectomy    History of tonsillectomy

## 2020-06-30 NOTE — ED PROCEDURE NOTE - US POC STATEMENT
The patient/family was/were informed of limited nature of the exam. Representative images were printed to be scanned into the chart or directly uploaded into the medical record. no

## 2020-06-30 NOTE — ED PROVIDER NOTE - PROGRESS NOTE DETAILS
Spoke to Dr. Celeste recommends admission for monitor h/h - admit to hospitalist service with gi consult. hgb 9.5, was 9.3 6/23/2020.

## 2020-06-30 NOTE — H&P ADULT - NSHPPHYSICALEXAM_GEN_ALL_CORE
GENERAL: NAD, well-developed  HEENT:  Atraumatic, Normocephalic; EOMI, PERRLA, conjunctiva pink, sclera white, Supple, No JVD, thyromegally or LYAD appreciated  CHEST/LUNG: Clear to auscultation bilaterally; No wheeze, ronchi or rales  HEART: Regular rate and rhythm; No murmurs, rubs, or gallops  ABDOMEN: Soft, Nontender, Nondistended; Bowel sounds present  EXTREMITIES:  2+ Peripheral Pulses, No peripheral pitting edema  PSYCH: AAOx3  NEUROLOGY: non-focal  SKIN: No rashes/lesions appreciated GENERAL: NAD, well-developed elderly AAF  HEENT:  Atraumatic, Normocephalic; EOMI, PERRLA, conjunctiva pink no pallor, somewhat dry oral mucosa, sclera white, Supple, No JVD  CHEST/LUNG: Clear to auscultation bilaterally; No wheeze/crackles  HEART: Regular rate and rhythm; No murmurs  ABDOMEN: Soft, Nontender, Nondistended; Bowel sounds present  EXTREMITIES:  2+ Peripheral Pulses, No peripheral pitting edema  PSYCH: AAOx3  NEUROLOGY: non-focal  SKIN: No rashes/lesions appreciated

## 2020-07-01 LAB
ANION GAP SERPL CALC-SCNC: 9 MMOL/L — SIGNIFICANT CHANGE UP (ref 7–14)
BASOPHILS # BLD AUTO: 0.03 K/UL — SIGNIFICANT CHANGE UP (ref 0–0.2)
BASOPHILS # BLD AUTO: 0.05 K/UL — SIGNIFICANT CHANGE UP (ref 0–0.2)
BASOPHILS NFR BLD AUTO: 0.4 % — SIGNIFICANT CHANGE UP (ref 0–1)
BASOPHILS NFR BLD AUTO: 0.6 % — SIGNIFICANT CHANGE UP (ref 0–1)
BUN SERPL-MCNC: 36 MG/DL — HIGH (ref 10–20)
CALCIUM SERPL-MCNC: 8.3 MG/DL — LOW (ref 8.5–10.1)
CHLORIDE SERPL-SCNC: 108 MMOL/L — SIGNIFICANT CHANGE UP (ref 98–110)
CO2 SERPL-SCNC: 23 MMOL/L — SIGNIFICANT CHANGE UP (ref 17–32)
CREAT SERPL-MCNC: 2.1 MG/DL — HIGH (ref 0.7–1.5)
CULTURE RESULTS: NO GROWTH — SIGNIFICANT CHANGE UP
EOSINOPHIL # BLD AUTO: 0.37 K/UL — SIGNIFICANT CHANGE UP (ref 0–0.7)
EOSINOPHIL # BLD AUTO: 0.45 K/UL — SIGNIFICANT CHANGE UP (ref 0–0.7)
EOSINOPHIL NFR BLD AUTO: 5.3 % — SIGNIFICANT CHANGE UP (ref 0–8)
EOSINOPHIL NFR BLD AUTO: 5.6 % — SIGNIFICANT CHANGE UP (ref 0–8)
FERRITIN SERPL-MCNC: 291 NG/ML — HIGH (ref 15–150)
GLUCOSE SERPL-MCNC: 87 MG/DL — SIGNIFICANT CHANGE UP (ref 70–99)
HCT VFR BLD CALC: 28.7 % — LOW (ref 37–47)
HCT VFR BLD CALC: 30 % — LOW (ref 37–47)
HGB BLD-MCNC: 8.7 G/DL — LOW (ref 12–16)
HGB BLD-MCNC: 9 G/DL — LOW (ref 12–16)
IMM GRANULOCYTES NFR BLD AUTO: 0.7 % — HIGH (ref 0.1–0.3)
IMM GRANULOCYTES NFR BLD AUTO: 1 % — HIGH (ref 0.1–0.3)
LYMPHOCYTES # BLD AUTO: 1.85 K/UL — SIGNIFICANT CHANGE UP (ref 1.2–3.4)
LYMPHOCYTES # BLD AUTO: 2.36 K/UL — SIGNIFICANT CHANGE UP (ref 1.2–3.4)
LYMPHOCYTES # BLD AUTO: 26.3 % — SIGNIFICANT CHANGE UP (ref 20.5–51.1)
LYMPHOCYTES # BLD AUTO: 29.2 % — SIGNIFICANT CHANGE UP (ref 20.5–51.1)
MAGNESIUM SERPL-MCNC: 1.9 MG/DL — SIGNIFICANT CHANGE UP (ref 1.8–2.4)
MCHC RBC-ENTMCNC: 28.1 PG — SIGNIFICANT CHANGE UP (ref 27–31)
MCHC RBC-ENTMCNC: 28.7 PG — SIGNIFICANT CHANGE UP (ref 27–31)
MCHC RBC-ENTMCNC: 30 G/DL — LOW (ref 32–37)
MCHC RBC-ENTMCNC: 30.3 G/DL — LOW (ref 32–37)
MCV RBC AUTO: 93.8 FL — SIGNIFICANT CHANGE UP (ref 81–99)
MCV RBC AUTO: 94.7 FL — SIGNIFICANT CHANGE UP (ref 81–99)
MONOCYTES # BLD AUTO: 0.86 K/UL — HIGH (ref 0.1–0.6)
MONOCYTES # BLD AUTO: 1.15 K/UL — HIGH (ref 0.1–0.6)
MONOCYTES NFR BLD AUTO: 12.2 % — HIGH (ref 1.7–9.3)
MONOCYTES NFR BLD AUTO: 14.2 % — HIGH (ref 1.7–9.3)
NEUTROPHILS # BLD AUTO: 3.87 K/UL — SIGNIFICANT CHANGE UP (ref 1.4–6.5)
NEUTROPHILS # BLD AUTO: 4 K/UL — SIGNIFICANT CHANGE UP (ref 1.4–6.5)
NEUTROPHILS NFR BLD AUTO: 49.4 % — SIGNIFICANT CHANGE UP (ref 42.2–75.2)
NEUTROPHILS NFR BLD AUTO: 55.1 % — SIGNIFICANT CHANGE UP (ref 42.2–75.2)
NRBC # BLD: 0 /100 WBCS — SIGNIFICANT CHANGE UP (ref 0–0)
NRBC # BLD: 0 /100 WBCS — SIGNIFICANT CHANGE UP (ref 0–0)
PLATELET # BLD AUTO: 322 K/UL — SIGNIFICANT CHANGE UP (ref 130–400)
PLATELET # BLD AUTO: 327 K/UL — SIGNIFICANT CHANGE UP (ref 130–400)
POTASSIUM SERPL-MCNC: 4.6 MMOL/L — SIGNIFICANT CHANGE UP (ref 3.5–5)
POTASSIUM SERPL-SCNC: 4.6 MMOL/L — SIGNIFICANT CHANGE UP (ref 3.5–5)
RBC # BLD: 3.03 M/UL — LOW (ref 4.2–5.4)
RBC # BLD: 3.2 M/UL — LOW (ref 4.2–5.4)
RBC # FLD: 14.7 % — HIGH (ref 11.5–14.5)
RBC # FLD: 14.9 % — HIGH (ref 11.5–14.5)
SARS-COV-2 RNA SPEC QL NAA+PROBE: SIGNIFICANT CHANGE UP
SODIUM SERPL-SCNC: 140 MMOL/L — SIGNIFICANT CHANGE UP (ref 135–146)
SPECIMEN SOURCE: SIGNIFICANT CHANGE UP
WBC # BLD: 7.03 K/UL — SIGNIFICANT CHANGE UP (ref 4.8–10.8)
WBC # BLD: 8.09 K/UL — SIGNIFICANT CHANGE UP (ref 4.8–10.8)
WBC # FLD AUTO: 7.03 K/UL — SIGNIFICANT CHANGE UP (ref 4.8–10.8)
WBC # FLD AUTO: 8.09 K/UL — SIGNIFICANT CHANGE UP (ref 4.8–10.8)

## 2020-07-01 PROCEDURE — 99223 1ST HOSP IP/OBS HIGH 75: CPT

## 2020-07-01 RX ORDER — ASPIRIN/CALCIUM CARB/MAGNESIUM 324 MG
81 TABLET ORAL DAILY
Refills: 0 | Status: DISCONTINUED | OUTPATIENT
Start: 2020-07-01 | End: 2020-07-05

## 2020-07-01 RX ORDER — ACETAMINOPHEN 500 MG
650 TABLET ORAL ONCE
Refills: 0 | Status: COMPLETED | OUTPATIENT
Start: 2020-07-01 | End: 2020-07-01

## 2020-07-01 RX ADMIN — Medication 650 MILLIGRAM(S): at 11:07

## 2020-07-01 RX ADMIN — PANTOPRAZOLE SODIUM 40 MILLIGRAM(S): 20 TABLET, DELAYED RELEASE ORAL at 06:20

## 2020-07-01 RX ADMIN — Medication 1 DROP(S): at 17:03

## 2020-07-01 RX ADMIN — PANTOPRAZOLE SODIUM 40 MILLIGRAM(S): 20 TABLET, DELAYED RELEASE ORAL at 17:02

## 2020-07-01 RX ADMIN — SACUBITRIL AND VALSARTAN 1 TABLET(S): 24; 26 TABLET, FILM COATED ORAL at 06:20

## 2020-07-01 RX ADMIN — LATANOPROST 1 DROP(S): 0.05 SOLUTION/ DROPS OPHTHALMIC; TOPICAL at 21:00

## 2020-07-01 RX ADMIN — SODIUM CHLORIDE 75 MILLILITER(S): 9 INJECTION INTRAMUSCULAR; INTRAVENOUS; SUBCUTANEOUS at 06:29

## 2020-07-01 RX ADMIN — Medication 1 DROP(S): at 06:27

## 2020-07-01 RX ADMIN — Medication 50 MILLIGRAM(S): at 06:20

## 2020-07-01 RX ADMIN — Medication 81 MILLIGRAM(S): at 17:02

## 2020-07-01 RX ADMIN — Medication 667 MILLIGRAM(S): at 11:08

## 2020-07-01 RX ADMIN — SERTRALINE 50 MILLIGRAM(S): 25 TABLET, FILM COATED ORAL at 11:09

## 2020-07-01 RX ADMIN — Medication 1 MILLIGRAM(S): at 11:08

## 2020-07-01 RX ADMIN — SACUBITRIL AND VALSARTAN 1 TABLET(S): 24; 26 TABLET, FILM COATED ORAL at 17:02

## 2020-07-01 RX ADMIN — CHLORHEXIDINE GLUCONATE 1 APPLICATION(S): 213 SOLUTION TOPICAL at 06:21

## 2020-07-01 RX ADMIN — Medication 81 MILLIGRAM(S): at 11:08

## 2020-07-01 RX ADMIN — ATORVASTATIN CALCIUM 40 MILLIGRAM(S): 80 TABLET, FILM COATED ORAL at 21:00

## 2020-07-01 NOTE — CONSULT NOTE ADULT - SUBJECTIVE AND OBJECTIVE BOX
Gastroenterology Consultation: For melena    Patient is a 84y old  Female who presents with a chief complaint of r/o GI Bleed (01 Jul 2020 03:44)      Admitted on: 06-30-20  HPI:  85y/o F with PMH of PE/DVT(for 1 year) on Eliquis, DLD, HFpEF (53%) s/p CRT-D, gout, HTN, CKD3-4 presents to ED after having black and bloody stools for 5 days. Patient noted black stools starting on June 25th, and since 6/29 had x2 episode of bright red blood streaked BMs.  Has had associated lower abd pain, radiating to L flank for 2wks - but mostly associated with full bladder and relieved after urinates.Recently pt started on iron supplements 6/23 but stopped 5 days ago. She admits to some inc weakness and mild dizziness since this started. No other CP, SOB, fever/chills, nausea/vomiting/diarrhea, back pain, headaches. Denies NSAID use.    *** PT IS A JEHOVA'S WITNESS - REFUSES ANY BLOOD PRODUCTS EVEN IF RISK OF DEATH ***    In ED: Tmax 99.5, HR 64, /65, RR18, SpO2 98%. DAPHNE showed brown stools  Labs: Hbg 9.3 (@ baseline), INR 1.43, Cr 2.2 (@ baseline). UA neg for infection or blood.   CT abd/pelvis neg for stones or signs of infection.   Admit for Hbg monitoring. (30 Jun 2020 18:02)        Prior EGD: None  Prior Colonoscopy:  States had colonoscopy in [place] in January that showed diverticulosis      PAST MEDICAL & SURGICAL HISTORY:  DVT, lower extremity  Rheumatoid arthritis  Diastolic heart failure  Diverticulitis: sigmoid  Gout  Hypertension  Pacemaker  Chronic kidney disease  History of hysterectomy  History of tonsillectomy  History of appendectomy  Artificial pacemaker      FAMILY HISTORY:  FH: arthritis: sister      Social History:  Tobacco: denied  Alcohol: denied  Drugs: denied    Home Medications:  apixaban 2.5 mg oral tablet: 1 tab(s) orally 2 times a day (14 Feb 2020 01:41)  aspirin 81 mg oral tablet, chewable: 1 tab(s) orally once a day (19 Feb 2020 15:48)  atorvastatin 40 mg oral tablet: 1 tab(s) orally once a day (20 Jun 2020 18:43)  calcium acetate 667 mg oral tablet: 1 tab(s) orally once a day (20 Jun 2020 18:44)  folic acid 1 mg oral tablet: 1 tab(s) orally once a day (20 Jun 2020 18:47)  furosemide 20 mg oral tablet: 1 tab(s) orally Tuesday, Thursday, Saturday, Sunday (23 Jun 2020 12:32)  latanoprost 0.005% ophthalmic solution: 1 drop(s) to each affected eye once a day (in the evening) (20 Jun 2020 18:48)  sertraline 50 mg oral tablet: 1 tab(s) orally once a day (14 Feb 2020 01:41)  Systane Ultra ophthalmic solution: 1 drop(s) to each affected eye once a day (20 Jun 2020 18:48)  timolol maleate 0.5% ophthalmic solution: 1 drop(s) to each affected eye 2 times a day (20 Jun 2020 18:47)    MEDICATIONS  (STANDING):  aspirin  chewable 81 milliGRAM(s) Oral daily  atorvastatin 40 milliGRAM(s) Oral at bedtime  calcium acetate 667 milliGRAM(s) Oral daily  chlorhexidine 4% Liquid 1 Application(s) Topical <User Schedule>  folic acid 1 milliGRAM(s) Oral daily  furosemide    Tablet 20 milliGRAM(s) Oral <User Schedule>  latanoprost 0.005% Ophthalmic Solution 1 Drop(s) Both EYES at bedtime  metoprolol succinate ER 50 milliGRAM(s) Oral daily  pantoprazole  Injectable 40 milliGRAM(s) IV Push two times a day  sacubitril 24 mG/valsartan 26 mG 1 Tablet(s) Oral two times a day  sertraline 50 milliGRAM(s) Oral daily  timolol 0.5% Solution 1 Drop(s) Both EYES two times a day    MEDICATIONS  (PRN):      Allergies  Keflex (Swelling)      Review of Systems:   Constitutional:  No Fever, No Chills  ENT/Mouth:  No Hearing Changes,  No Difficulty Swallowing  Eyes:  No Eye Pain, No Vision Changes  Cardiovascular:  No Chest Pain, No Palpitations  Respiratory:  No Cough, No Dyspnea  Gastrointestinal:  non-distended, NT, DAPHNE empty vault  Musculoskeletal:  No Joint Swelling, No Back Pain  Skin:  No Skin Lesions, No Jaundice  Neuro:  No Syncope, No Dizziness  Heme/Lymph:  No Bruising, No Bleeding.          Physical Examination:  T(C): 36.3 (07-01-20 @ 07:28), Max: 37.5 (06-30-20 @ 12:51)  HR: 60 (07-01-20 @ 07:28) (60 - 75)  BP: 174/84 (07-01-20 @ 07:28) (133/63 - 187/90)  RR: 19 (07-01-20 @ 07:28) (17 - 19)  SpO2: 100% (06-30-20 @ 22:32) (98% - 100%)  Height (cm): 160.02 (06-30-20 @ 12:51)  Weight (kg): 64.9 (06-30-20 @ 12:51)      Constitutional: No acute distress.  Eyes:. Conjunctivae are clear, Sclera is non-icteric.  Ears Nose and Throat: The external ears are normal appearing,  Oral mucosa is pink and moist.  Respiratory:  No signs of respiratory distress. Lung sounds are clear bilaterally.  Cardiovascular:  S1 S2, Regular rate and rhythm.  GI: Abdomen is soft, symmetric, and non-tender without distention. There are no visible lesions. Bowel sounds are present and normoactive in all four quadrants. No masses, hepatomegaly, or splenomegaly are noted.   Neuro: No Tremor, No involuntary movements  Skin: No rashes, No Jaundice.          Data: (reviewed by attending)                        8.7    8.09  )-----------( 327      ( 01 Jul 2020 07:00 )             28.7     Hgb Trend:  8.7  07-01-20 @ 07:00  8.5  06-30-20 @ 21:14  9.5  06-30-20 @ 14:16      07-01    140  |  108  |  36<H>  ----------------------------<  87  4.6   |  23  |  2.1<H>    Ca    8.3<L>      01 Jul 2020 07:00  Mg     1.9     07-01    TPro  7.3  /  Alb  4.0  /  TBili  0.2  /  DBili  x   /  AST  26  /  ALT  12  /  AlkPhos  127<H>  06-30    Liver panel trend:  TBili 0.2   /   AST 26   /   ALT 12   /   AlkP 127   /   Tptn 7.3   /   Alb 4.0    /   DBili --      06-30  TBili 0.2   /   AST 33   /   ALT 21   /   AlkP 123   /   Tptn 6.1   /   Alb 3.1    /   DBili --      06-23  TBili 0.5   /   AST 47   /   ALT 24   /   AlkP 119   /   Tptn 5.6   /   Alb 3.0    /   DBili --      06-22      PT/INR - ( 30 Jun 2020 14:16 )   PT: 16.40 sec;   INR: 1.43 ratio         PTT - ( 30 Jun 2020 14:16 )  PTT:37.1 sec        Radiology:(reviewed by attending)  CT Abdomen and Pelvis No Cont:   EXAM:  CT ABDOMEN AND PELVIS            PROCEDURE DATE:  06/30/2020            INTERPRETATION:  CLINICAL STATEMENT: Abdominal pain dysuria flank pain.      TECHNIQUE: Contiguous axial CT images were obtained from the lower chest to the pubic symphysis without intravenous contrast.  Oral contrast was not administered.  Reformatted images in the coronal and sagittal planes were acquired.    COMPARISON CT: 9/27/2019.    OTHER STUDIES USED FOR CORRELATION: None.       FINDINGS: Limited evaluation without IV contrast.    LOWER CHEST: Unremarkable.    HEPATOBILIARY: Status post cholecystectomy.    SPLEEN: Unremarkable.    PANCREAS: Unremarkable.    ADRENAL GLANDS: Unremarkable.    KIDNEYS: No stones or hydronephrosis.    ABDOMINOPELVIC NODES: Unremarkable.    PELVIC ORGANS: Status post hysterectomy.    PERITONEUM/MESENTERY/BOWEL: No bowel obstruction free fluid or free air. Severe colonic diverticulosis without evidence of diverticulitis. No intra-abdominal collection.    BONES/SOFT TISSUES: No acute bony abnormality.      IMPRESSION:     No stones or hydronephrosis. No acute abnormality in the abdomen and pelvis.                  KAL SAMUEL M.D., ATTENDING RADIOLOGIST  This document has been electronically signed. Jun 30 2020  3:36PM            (06-30-20 @ 14:38)

## 2020-07-01 NOTE — PROGRESS NOTE ADULT - ASSESSMENT
85y/o F with CHFrEF, DVT/PE on eliquis, HTN, CKD, hx of anemia presents to ED with possible melena and bloody stools.     #R/o Upper GI bleed vs. hemorrhoids  - DDX: UGIB more likely with melena, patient has not had any bleeding since being here, history of diverticulosis but no bright red blood  **** NO BLOOD TRANSFUSIONS AS PER PT WISHES, JEHOVA'S WITNESS ****  - Hb 8.7 this morning, stable  - DAPHNE neg in ED, negative at bedside this morning  - hold PO iron & eliquis (no NSAIDs)- last eliquis dose yesterday   - protonix IV BID  - NPO, IV 18 x2  - d/c ivf now, patient with ckd, hfref, hypertensive now  - CBC @ 4pm today  - Cardio cleared pt for EGD, f/u GI recommendations    #CKD4  - Cr @ baseline at 2.1, monitor and avoid nephrotoxins    #Hx of PE/DVT  - hold eliquis for now    #CHFpEF- c/w lasix TThSaSu, toprol 50, entrestor 24-26, asa 81  #HDL- c/w atorvastatin 40  #Glaucoma- c/w eye drops  #Likely folic acid def- c/w supplement    #MISC  - DVT ppx: SCD  - GI ppx: PPI BID  - Diet: NPO  - Activity: as tolerated  - Dispo: acute, f/u GI recs, if Hb stable can start d/c planning

## 2020-07-01 NOTE — PROGRESS NOTE ADULT - SUBJECTIVE AND OBJECTIVE BOX
RACHEL SUMMERS  [id number]      Patient is a 84y old  Female who presents with a chief complaint of r/o GI Bleed (01 Jul 2020 03:44)        -PMHx: DVT, lower extremity [Active]  Rheumatoid arthritis [Active]  Diastolic heart failure [Active]  Atrial fibrillation [Inactive]  Diverticulitis [Active]  Gout [Active]  Hypertension [Active]  Pacemaker [Active]  Chronic kidney disease [Active]    -PSHx:History of hysterectomy  History of tonsillectomy  History of appendectomy  Artificial pacemaker      Interval events:  Patient seen and examined at bedside. No acute events overnight. Patient says she has no abdominal pain, has not had a BM, no nausea or vomiting.     REVIEW OF SYSTEMS:  CONSTITUTIONAL: No fever, weight loss, or fatigue  RESPIRATORY: No cough, wheezing, chills or hemoptysis; No shortness of breath  CARDIOVASCULAR: No chest pain, palpitations, dizziness, or leg swelling  GASTROINTESTINAL: No abdominal or epigastric pain. No nausea, vomiting, or hematemesis; No diarrhea or constipation. No melena or hematochezia.  NEUROLOGICAL: No headaches  LYMPH NODES: No enlarged glands  MUSCULOSKELETAL: No joint pain or swelling; No muscle, back, or extremity pain      T(C): , Max: 37.5 (06-30-20 @ 12:51)  HR: 60 (07-01-20 @ 07:28)  BP: 174/84 (07-01-20 @ 07:28)  RR: 19 (07-01-20 @ 07:28)  SpO2: 100% (06-30-20 @ 22:32)  CAPILLARY BLOOD GLUCOSE          PHYSICAL EXAM:  GENERAL: NAD, well-developed elderly AAF  HEENT:  Atraumatic, Normocephalic; EOMI, PERRLA, conjunctiva pink no pallor, sclera white, Supple, No JVD  CHEST/LUNG: Clear to auscultation bilaterally; No wheeze/crackles  HEART: Regular rate and rhythm; No murmurs  ABDOMEN: Soft, Nontender, Nondistended; Bowel sounds present  EXTREMITIES:  2+ Peripheral Pulses, No peripheral pitting edema  PSYCH: AAOx3  NEUROLOGY: non-focal  SKIN: No rashes/lesions appreciated      LABS:          8.7  8.09  )-------(327          28.7  N=49.4  L=29.2  MCV=94.7          8.5  8.06  )-------(313          27.9  N=--  L=--  MCV=92.4          9.5  8.02  )-------(364          31.4  N=45.4  L=33.0  MCV=93.2    140|108|36<H>  ------------------<87  4.6|23|2.1<H>  eGFR:21<L>  Ca:8.3<L>  140|105|35<H>  ------------------<96  4.7|22|2.2<H>  eGFR:20<L>  Ca:9.1      PT/INR - ( 30 Jun 2020 14:16 )   PT: 16.40 sec;   INR: 1.43 ratio         PTT - ( 30 Jun 2020 14:16 )  PTT:37.1 sec      Microbiology:      RADIOLOGY & ADDITIONAL TESTS:  < from: CT Abdomen and Pelvis No Cont (06.30.20 @ 14:38) >  IMPRESSION:     No stones or hydronephrosis. No acute abnormality in the abdomen and pelvis.    < end of copied text >    < from: Xray Chest 1 View- PORTABLE-Routine (06.30.20 @ 14:26) >  Impression:      Cardiomegaly withmild CHF. Follow-up as needed.    < end of copied text >        Medications:  aspirin  chewable 81 milliGRAM(s) Oral daily  atorvastatin 40 milliGRAM(s) Oral at bedtime  calcium acetate 667 milliGRAM(s) Oral daily  chlorhexidine 4% Liquid 1 Application(s) Topical <User Schedule>  folic acid 1 milliGRAM(s) Oral daily  furosemide    Tablet 20 milliGRAM(s) Oral <User Schedule>  latanoprost 0.005% Ophthalmic Solution 1 Drop(s) Both EYES at bedtime  metoprolol succinate ER 50 milliGRAM(s) Oral daily  pantoprazole  Injectable 40 milliGRAM(s) IV Push two times a day  sacubitril 24 mG/valsartan 26 mG 1 Tablet(s) Oral two times a day  sertraline 50 milliGRAM(s) Oral daily  timolol 0.5% Solution 1 Drop(s) Both EYES two times a day

## 2020-07-01 NOTE — CONSULT NOTE ADULT - ATTENDING COMMENTS
#Black stool r/o UGIB  - VS is stable  - H/H stable   - Monitor hgb daily, Jehova witness patient please avoid unnecessary blood draw  - PPI BID IV  - Hold eliquis, last dose was 6/30 morning  - Clear liquid , no red tooday, NPO after midnight  - cardiology risk stratification for EGD--> Moderate to high risk  - will plan for EGD tomorrow

## 2020-07-01 NOTE — CONSULT NOTE ADULT - ASSESSMENT
83 YO F with PMH of PE/DVT on Eliquis, DLD, Non-ischemic CM (last EF of 25%) s/p CRT-D with recent NST failed to show any reversible ishcemia, gout, HTN, CKD3-4 presents to ED after having black and bloody stools for 5 days, being seen by Cardiology for preop risk stratification for EGD.    Impression:    Non-ischemic CM (last EF of 25%) s/p CRT-D with recent normal NST - on GDMT  DVT/PE on Eliquis (on hold due to GI bleed)  CKD 3-4     Recommend:    - No acute cardiac decompensation, malignant arrhythmia or ACS  - METs<2, as pt. can barely walk with a walker  - RCRI of 2 (Cr >2, and CHF), pt. as moderate to high risk of MACE for intermediate to low risk procedure  - Monitor Hb/Hct, pt. refusing transfusion due to being a [employer]  - Continue with GDMT for NICM (including ASA 81mg, lipitor 40mg QHS, Entresto 24/26mg Q12, Lasix 20 mg Q24, Toprol 50mg XL  - Avoid volume overload  - No further cardiac work up indicated at this tie

## 2020-07-01 NOTE — GOALS OF CARE CONVERSATION - ADVANCED CARE PLANNING - CONVERSATION DETAILS
Met with patient  to discuss overall goals of care. Discussed patient's current prognosis, diagnosis, and treatment options.  Patient is able to make decisions. She decided for full medical management .Will discuss with daughter first. Full code for now.

## 2020-07-01 NOTE — CONSULT NOTE ADULT - ASSESSMENT
83y/o F with PMH of PE/DVT(for 1 year) on Eliquis, DLD, HFpEF (53%) s/p CRT-D, gout, HTN, CKD3-4 presents to ED after having black and bloody stools for 5 days.      #Black stool r/o UGIB  - VS is stable  - H/H stable   - Monitor hgb daily, Jehova witness patient please avoid unnecessary blood draw  - PPI BID IV  - Hold eliquis, last dose was 6/30 morning  - Clear liquid , no red tooday, NPO after midnight  - cardiology risk stratification for EGD  - will plan for EGD tomoroow    WILL DISCUSS THE CASE WITH ATTENDING 85y/o F with PMH of PE/DVT(for 1 year) on Eliquis, DLD, HFpEF (53%) s/p CRT-D, gout, HTN, CKD3-4 presents to ED after having black and bloody stools for 5 days.      #Black stool r/o UGIB  - VS is stable  - H/H stable   - Monitor hgb daily, Jehova witness patient please avoid unnecessary blood draw  - PPI BID IV  - Hold eliquis, last dose was 6/30 morning  - Clear liquid , no red tooday, NPO after midnight  - cardiology risk stratification for EGD--> Moderate to high risk  - will plan for EGD tomorrow

## 2020-07-01 NOTE — PROGRESS NOTE ADULT - ATTENDING COMMENTS
Patient was seen independently. Latest vital signs and labs were reviewed. Case was discussed with housestaff. Agree with resident's assessment and plan with     following additions/modifications.     Patient denies any headache, any vision complaints, runny nose, fever, chills, sore throat. Denies chest pain, shortness of breath, palpitation. Denies     nausea, vomiting, abdominal pain, diarrhoea, Denies urinary burning, urgency, frequency, dysuria. Denies weakness in any part of the body or numbness.  At least 10 systems were reviewed in ROS. All systems reviewed  are within normal limits except for the complaints as described in Subjective.       Not in acute distress  General: No pallor, No icterus, afebrile  HEENT: No JVD, no Bruit.  Heart: S1+S2 audible  Lungs: bilateral  fair air entry, no wheezing, no crepitations.  Abdomen: Soft, non-tender, non-distended , no  rigidity or guarding.  CNS: Grossly intact, sensations intact.  Extremities:  No edema            ASSESSMENT & PLAN:   Melena secondary to gastrointestinal bleeding   Plan for EGD tomorrow  Not a candidate for BT as patient  is   JEHOVA'S WITNESS  hemoglobin/hematocrit  stable   Monitor H/H Q 12 hrs  Started on clears. NPO after midnight  Hold anti-coagulation including ASA and anti-hypertension meds  Monitor BP and HR closely.  GI Prophylaxis with Protonix 40mg intravenous push  twice daily .  Chronic renal disease  stage 4 -at baseline  Moderate to high risk of MACE for intermediate to low risk procedure  continue with ASA 81mg, Lipitor 40mg QHS, Entresto 24/26mg Q12, Lasix 20 mg Q24, Toprol 50mg XL  Discharge Disposition: Home when stable . Plan for EGD in AM Patient was seen independently. Latest vital signs and labs were reviewed. Case was discussed with housestaff. Agree with resident's assessment and plan with following additions/modifications.     Patient denies any headache, any vision complaints, runny nose, fever, chills, sore throat. Denies chest pain, shortness of breath, palpitation. Denies     nausea, vomiting, abdominal pain, diarrhoea, Denies urinary burning, urgency, frequency, dysuria. Denies weakness in any part of the body or numbness.  At least 10 systems were reviewed in ROS. All systems reviewed  are within normal limits except for the complaints as described in Subjective.       Not in acute distress  General: No pallor, No icterus, afebrile  HEENT: No JVD, no Bruit.  Heart: S1+S2 audible  Lungs: bilateral  fair air entry, no wheezing, no crepitations.  Abdomen: Soft, non-tender, non-distended , no  rigidity or guarding.  CNS: Grossly intact, sensations intact.  Extremities:  No edema            ASSESSMENT & PLAN:   Melena secondary to gastrointestinal bleeding   Plan for EGD tomorrow  Not a candidate for BT as patient  is   JEHOVA'S WITNESS  hemoglobin/hematocrit  stable   Monitor H/H Q 12 hrs  Started on clears. NPO after midnight  Hold anti-coagulation . Continue with ASA and anti-hypertension meds  Monitor BP and HR closely.  GI Prophylaxis with Protonix 40mg intravenous push  twice daily .  Chronic renal disease  stage 4 -at baseline  Moderate to high risk  for intermediate to low risk procedure  continue with ASA 81mg, Lipitor 40mg QHS, Entresto 24/26mg Q12, Lasix 20 mg Q24, Toprol 50mg XL  Discharge Disposition: Home when stable . Plan for EGD in AM

## 2020-07-01 NOTE — CONSULT NOTE ADULT - SUBJECTIVE AND OBJECTIVE BOX
Date of Admission: 6/30    CHIEF COMPLAINT:     HISTORY OF PRESENT ILLNESS:   85 YO F with PMH of PE/DVT on Eliquis, DLD, Non-ischemic CM (last EF of 25%) s/p CRT-D with recent NST failed to show any reversible ishcemia, gout, HTN, CKD3-4 presents to ED after having black and bloody stools for 5 days. Patient noted black stools starting on June 25th, and since 6/29 had x2 episode of bright red blood streaked BMs. Last BM was 6/30. Has had associated lower abd pain, radiating to L flank for 2wks - but mostly associated with full bladder and relieved after urinates. Pain is mild and not a/w food intake. Does endorse dysuria. Today had sharper L flank pain in AM as well. Recently pt started on iron supplements 6/23 but stopped 3d ago. She admits to some inc weakness and mild dizziness since this started. No other CP, SOB, fever/chills, nausea/vomiting/diarrhea, back pain, headaches. Denies NSAID use. States had colonoscopy in [place] in January that showed diverticulosis but nothing else she can remember. Never had EGD.    Cardiology HPI:      PAST MEDICAL & SURGICAL HISTORY:  DVT, lower extremity  Rheumatoid arthritis  Diastolic heart failure  Diverticulitis: sigmoid  Gout  Hypertension  Pacemaker  Chronic kidney disease  History of hysterectomy  History of tonsillectomy  History of appendectomy  Artificial pacemaker      FAMILY HISTORY:  [x] no pertinent family history of premature cardiovascular disease in first degree relatives.  Mother:   Father:   Siblings:     SOCIAL HISTORY:    [x] Non-smoker  [ ] Smoker   [-] Alcohol    Allergies    Keflex (Swelling)    Intolerances    	    REVIEW OF SYSTEMS:  CONSTITUTIONAL: No fever, weight loss, or fatigue  CARDIOLOGY: denies chest pain, shortness of breath or syncopal episodes.   RESPIRATORY: denies shortness of breath, wheezing.   NEUROLOGICAL: No weakness, no focal deficits to report.  ENDOCRINOLOGICAL: no recent change in diabetic medications.   GI: no BRBPR, no N,V, diarrhea.    PSYCHIATRY: normal mood and affect  HEENT: no nasal discharge, no ecchymosis  SKIN: no ecchymosis, no breakdown  MUSCULOSKELETAL: Full range of motion x4.     PHYSICAL EXAM:  T(C): 36.7 (06-30-20 @ 22:32), Max: 37.5 (06-30-20 @ 12:51)  HR: 69 (06-30-20 @ 22:32) (64 - 75)  BP: 134/73 (06-30-20 @ 22:32) (133/63 - 187/90)  RR: 18 (06-30-20 @ 22:32) (17 - 18)  SpO2: 100% (06-30-20 @ 22:32) (98% - 100%)  Wt(kg): --  I&O's Summary      General Appearance: well appearing, normal for age and gender. 	  Neck: normal JVP, no bruit.   Eyes: No xanthelasma, Extra ocular muscles intact.   Cardiovascular: regular rate and rhythm S1 S2, No JVD, No murmurs, No edema  Respiratory: Lungs clear to auscultation	  Psychiatry: Alert and oriented x 3, Mood & affect appropriate  Gastrointestinal:  Soft, Non-tender  Skin/Integument: No rashes, No ecchymosis, No cyanosis	  Neurologic: Non-focal  Musculoskeletal/ extremities: Normal range of motion, No clubbing, cyanosis or edema  Vascular: Peripheral pulses palpable 2+ bilaterally    LABS:	 	                          8.5    8.06  )-----------( 313      ( 30 Jun 2020 21:14 )             27.9     06-30    140  |  105  |  35<H>  ----------------------------<  96  4.7   |  22  |  2.2<H>    Ca    9.1      30 Jun 2020 14:16  Mg     2.0     06-30    TPro  7.3  /  Alb  4.0  /  TBili  0.2  /  DBili  x   /  AST  26  /  ALT  12  /  AlkPhos  127<H>  06-30        PT/INR - ( 30 Jun 2020 14:16 )   PT: 16.40 sec;   INR: 1.43 ratio         PTT - ( 30 Jun 2020 14:16 )  PTT:37.1 sec    TELEMETRY EVENTS: 	        ECG:  	  < from: 12 Lead ECG (06.30.20 @ 15:25) >  AV dual-paced rhythm        RADIOLOGY:  CXR:   Xray Chest 1 View- PORTABLE-Routine (06.30.20 @ 14:26)   Cardiomegaly withmild CHF. Follow-up as needed.      PREVIOUS DIAGNOSTIC TESTING:    [x] Echocardiogram:  < from: Transthoracic Echocardiogram (06.21.20 @ 11:33) >   1. Left ventricular ejection fraction, by visual estimation, is 20 to 25%.   2. Severely decreased global left ventricular systolic function.   3. Spectral Doppler shows pseudonormal pattern of left ventricular myocardial filling (Grade II diastolic dysfunction).   4. Left atrium is mildly enlarged.   5. Mildly enlarged right atrium.   6. Mild mitral valve regurgitation.   7. Mild thickening of the anterior and posterior mitral valve leaflets.   8. Moderate tricuspid regurgitation.   9. Estimated pulmonary artery systolic pressure is 63.7 mmHg assuming a right atrial pressure of 8 mmHg, which is consistent with severe pulmonary hypertension.    [x]  Catheterization:      [x] Stress Test:  	    NM Nuclear Stress Pharmacologic Multiple (06.22.20 @ 11:26)   1. NORMAL ADENOSINE / REST MYOCARDIAL PERFUSION SPECT TOMOGRAPHY, WITH NO EVIDENCE FOR ISCHEMIA DURING ADENOSINE INFUSION.   2. NORMAL RESTING LEFT VENTRICULAR WALL MOTION AND WALL THICKENING.  3. LEFT VENTRICULAR EJECTION FRACTION OF  56 % WHICH IS WITHIN RANGE OF NORMAL.     	    Home Medications:  apixaban 2.5 mg oral tablet: 1 tab(s) orally 2 times a day (14 Feb 2020 01:41)  aspirin 81 mg oral tablet, chewable: 1 tab(s) orally once a day (19 Feb 2020 15:48)  atorvastatin 40 mg oral tablet: 1 tab(s) orally once a day (20 Jun 2020 18:43)  calcium acetate 667 mg oral tablet: 1 tab(s) orally once a day (20 Jun 2020 18:44)  folic acid 1 mg oral tablet: 1 tab(s) orally once a day (20 Jun 2020 18:47)  furosemide 20 mg oral tablet: 1 tab(s) orally Tuesday, Thursday, Saturday, Sunday (23 Jun 2020 12:32)  latanoprost 0.005% ophthalmic solution: 1 drop(s) to each affected eye once a day (in the evening) (20 Jun 2020 18:48)  sertraline 50 mg oral tablet: 1 tab(s) orally once a day (14 Feb 2020 01:41)  Systane Ultra ophthalmic solution: 1 drop(s) to each affected eye once a day (20 Jun 2020 18:48)  timolol maleate 0.5% ophthalmic solution: 1 drop(s) to each affected eye 2 times a day (20 Jun 2020 18:47)    MEDICATIONS  (STANDING):  aspirin  chewable 81 milliGRAM(s) Oral daily  atorvastatin 40 milliGRAM(s) Oral at bedtime  calcium acetate 667 milliGRAM(s) Oral daily  chlorhexidine 4% Liquid 1 Application(s) Topical <User Schedule>  folic acid 1 milliGRAM(s) Oral daily  furosemide    Tablet 20 milliGRAM(s) Oral <User Schedule>  latanoprost 0.005% Ophthalmic Solution 1 Drop(s) Both EYES at bedtime  metoprolol succinate ER 50 milliGRAM(s) Oral daily  pantoprazole  Injectable 40 milliGRAM(s) IV Push two times a day  sacubitril 24 mG/valsartan 26 mG 1 Tablet(s) Oral two times a day  sertraline 50 milliGRAM(s) Oral daily  sodium chloride 0.9%. 1000 milliLiter(s) (75 mL/Hr) IV Continuous <Continuous>  timolol 0.5% Solution 1 Drop(s) Both EYES two times a day    MEDICATIONS  (PRN):

## 2020-07-02 ENCOUNTER — RESULT REVIEW (OUTPATIENT)
Age: 85
End: 2020-07-02

## 2020-07-02 ENCOUNTER — TRANSCRIPTION ENCOUNTER (OUTPATIENT)
Age: 85
End: 2020-07-02

## 2020-07-02 LAB
ANION GAP SERPL CALC-SCNC: 11 MMOL/L — SIGNIFICANT CHANGE UP (ref 7–14)
BASOPHILS # BLD AUTO: 0.03 K/UL — SIGNIFICANT CHANGE UP (ref 0–0.2)
BASOPHILS NFR BLD AUTO: 0.4 % — SIGNIFICANT CHANGE UP (ref 0–1)
BUN SERPL-MCNC: 29 MG/DL — HIGH (ref 10–20)
CALCIUM SERPL-MCNC: 8.3 MG/DL — LOW (ref 8.5–10.1)
CHLORIDE SERPL-SCNC: 109 MMOL/L — SIGNIFICANT CHANGE UP (ref 98–110)
CO2 SERPL-SCNC: 19 MMOL/L — SIGNIFICANT CHANGE UP (ref 17–32)
CREAT SERPL-MCNC: 2.1 MG/DL — HIGH (ref 0.7–1.5)
EOSINOPHIL # BLD AUTO: 0.24 K/UL — SIGNIFICANT CHANGE UP (ref 0–0.7)
EOSINOPHIL NFR BLD AUTO: 3 % — SIGNIFICANT CHANGE UP (ref 0–8)
GLUCOSE SERPL-MCNC: 87 MG/DL — SIGNIFICANT CHANGE UP (ref 70–99)
HCT VFR BLD CALC: 30.1 % — LOW (ref 37–47)
HGB BLD-MCNC: 9.4 G/DL — LOW (ref 12–16)
IMM GRANULOCYTES NFR BLD AUTO: 0.5 % — HIGH (ref 0.1–0.3)
LYMPHOCYTES # BLD AUTO: 1.34 K/UL — SIGNIFICANT CHANGE UP (ref 1.2–3.4)
LYMPHOCYTES # BLD AUTO: 16.8 % — LOW (ref 20.5–51.1)
MCHC RBC-ENTMCNC: 28.9 PG — SIGNIFICANT CHANGE UP (ref 27–31)
MCHC RBC-ENTMCNC: 31.2 G/DL — LOW (ref 32–37)
MCV RBC AUTO: 92.6 FL — SIGNIFICANT CHANGE UP (ref 81–99)
MONOCYTES # BLD AUTO: 0.82 K/UL — HIGH (ref 0.1–0.6)
MONOCYTES NFR BLD AUTO: 10.3 % — HIGH (ref 1.7–9.3)
NEUTROPHILS # BLD AUTO: 5.51 K/UL — SIGNIFICANT CHANGE UP (ref 1.4–6.5)
NEUTROPHILS NFR BLD AUTO: 69 % — SIGNIFICANT CHANGE UP (ref 42.2–75.2)
NRBC # BLD: 0 /100 WBCS — SIGNIFICANT CHANGE UP (ref 0–0)
PLATELET # BLD AUTO: 296 K/UL — SIGNIFICANT CHANGE UP (ref 130–400)
POTASSIUM SERPL-MCNC: 5.5 MMOL/L — HIGH (ref 3.5–5)
POTASSIUM SERPL-SCNC: 5.5 MMOL/L — HIGH (ref 3.5–5)
RBC # BLD: 3.25 M/UL — LOW (ref 4.2–5.4)
RBC # FLD: 14.6 % — HIGH (ref 11.5–14.5)
SODIUM SERPL-SCNC: 139 MMOL/L — SIGNIFICANT CHANGE UP (ref 135–146)
WBC # BLD: 7.98 K/UL — SIGNIFICANT CHANGE UP (ref 4.8–10.8)
WBC # FLD AUTO: 7.98 K/UL — SIGNIFICANT CHANGE UP (ref 4.8–10.8)

## 2020-07-02 PROCEDURE — 99233 SBSQ HOSP IP/OBS HIGH 50: CPT

## 2020-07-02 PROCEDURE — 43239 EGD BIOPSY SINGLE/MULTIPLE: CPT

## 2020-07-02 PROCEDURE — 88312 SPECIAL STAINS GROUP 1: CPT | Mod: 26

## 2020-07-02 RX ORDER — SOD SULF/SODIUM/NAHCO3/KCL/PEG
4000 SOLUTION, RECONSTITUTED, ORAL ORAL ONCE
Refills: 0 | Status: COMPLETED | OUTPATIENT
Start: 2020-07-02 | End: 2020-07-02

## 2020-07-02 RX ADMIN — SERTRALINE 50 MILLIGRAM(S): 25 TABLET, FILM COATED ORAL at 12:34

## 2020-07-02 RX ADMIN — Medication 20 MILLIGRAM(S): at 12:34

## 2020-07-02 RX ADMIN — Medication 1 DROP(S): at 05:02

## 2020-07-02 RX ADMIN — Medication 50 MILLIGRAM(S): at 05:01

## 2020-07-02 RX ADMIN — Medication 81 MILLIGRAM(S): at 12:35

## 2020-07-02 RX ADMIN — Medication 4000 MILLILITER(S): at 17:54

## 2020-07-02 RX ADMIN — Medication 1 DROP(S): at 17:53

## 2020-07-02 RX ADMIN — SACUBITRIL AND VALSARTAN 1 TABLET(S): 24; 26 TABLET, FILM COATED ORAL at 05:01

## 2020-07-02 RX ADMIN — Medication 667 MILLIGRAM(S): at 12:34

## 2020-07-02 RX ADMIN — LATANOPROST 1 DROP(S): 0.05 SOLUTION/ DROPS OPHTHALMIC; TOPICAL at 21:06

## 2020-07-02 RX ADMIN — Medication 20 MILLIGRAM(S): at 21:04

## 2020-07-02 RX ADMIN — PANTOPRAZOLE SODIUM 40 MILLIGRAM(S): 20 TABLET, DELAYED RELEASE ORAL at 17:52

## 2020-07-02 RX ADMIN — ATORVASTATIN CALCIUM 40 MILLIGRAM(S): 80 TABLET, FILM COATED ORAL at 21:05

## 2020-07-02 RX ADMIN — CHLORHEXIDINE GLUCONATE 1 APPLICATION(S): 213 SOLUTION TOPICAL at 05:02

## 2020-07-02 RX ADMIN — PANTOPRAZOLE SODIUM 40 MILLIGRAM(S): 20 TABLET, DELAYED RELEASE ORAL at 05:01

## 2020-07-02 RX ADMIN — SACUBITRIL AND VALSARTAN 1 TABLET(S): 24; 26 TABLET, FILM COATED ORAL at 17:53

## 2020-07-02 RX ADMIN — Medication 1 MILLIGRAM(S): at 12:34

## 2020-07-02 NOTE — CHART NOTE - NSCHARTNOTEFT_GEN_A_CORE
PACU ANESTHESIA ADMISSION NOTE      Procedure:   Post op diagnosis:      ____  Intubated  TV:______       Rate: ______      FiO2: ______    _x___  Patent Airway    _x___  Full return of protective reflexes    _x___  Full recovery from anesthesia / back to baseline     Vitals:   T:           R:  14                BP: 133/79                 Sat:  100%                 P: 78      Mental Status:  __x__ Awake   __x__ Alert   _____ Drowsy   _____ Sedated    Nausea/Vomiting:  __x__ NO  ______Yes,   See Post - Op Orders          Pain Scale (0-10):  _0____    Treatment: ____ None    ____ See Post - Op/PCA Orders    Post - Operative Fluids:   ____ Oral   ____ See Post - Op Orders    Plan: Discharge:   ____Home       __x___Floor     _____Critical Care    _____  Other:_________________    Comments:

## 2020-07-02 NOTE — PROGRESS NOTE ADULT - SUBJECTIVE AND OBJECTIVE BOX
RACHEL SUMMERS  [id number] ([id number]      Patient is a 84y old  Female who presents with a chief complaint of r/o GI Bleed (01 Jul 2020 09:12)        -PMHx: DVT, lower extremity [Active]  Rheumatoid arthritis [Active]  Diastolic heart failure [Active]  Atrial fibrillation [Inactive]  Diverticulitis [Active]  Gout [Active]  Hypertension [Active]  Pacemaker [Active]  Chronic kidney disease [Active]    -PSHx:History of hysterectomy  History of tonsillectomy  History of appendectomy  Artificial pacemaker      Interval events:  Patient seen and examined at bedside. No acute events overnight. She denies any abdominal pain, bloating, n/v, gi bleeding. No BM. For EGD today.     REVIEW OF SYSTEMS:  CONSTITUTIONAL: No fever, weight loss, or fatigue  RESPIRATORY: No cough, wheezing, chills or hemoptysis; No shortness of breath  CARDIOVASCULAR: No chest pain, palpitations, dizziness, or leg swelling  GASTROINTESTINAL: No abdominal or epigastric pain. No nausea, vomiting, or hematemesis; No diarrhea or constipation. No melena or hematochezia.  NEUROLOGICAL: No headaches  LYMPH NODES: No enlarged glands  MUSCULOSKELETAL: No joint pain or swelling; No muscle, back, or extremity pain      T(C): , Max: 37.4 (07-02-20 @ 07:44)  HR: 84 (07-02-20 @ 09:29)  BP: 171/87 (07-02-20 @ 09:29)  RR: 18 (07-02-20 @ 09:29)  SpO2: 98% (07-02-20 @ 09:02)  CAPILLARY BLOOD GLUCOSE        PHYSICAL EXAM:  GENERAL: NAD, well-developed elderly AAF  HEENT:  Atraumatic, Normocephalic; EOMI, PERRLA, conjunctiva pink no pallor, sclera white, Supple, No JVD  CHEST/LUNG: Clear to auscultation bilaterally; No wheeze/crackles  HEART: Regular rate and rhythm; No murmurs  ABDOMEN: Soft, Nontender, Nondistended; Bowel sounds present  EXTREMITIES:  2+ Peripheral Pulses, No peripheral pitting edema  PSYCH: AAOx3  NEUROLOGY: non-focal  SKIN: No rashes/lesions appreciated      LABS:          9.0  7.03  )-------(322          30.0  N=55.1  L=26.3  MCV=93.8        PT/INR - ( 30 Jun 2020 14:16 )   PT: 16.40 sec;   INR: 1.43 ratio         PTT - ( 30 Jun 2020 14:16 )  PTT:37.1 sec      Microbiology:    Culture - Urine (collected 06-30-20 @ 15:25)  Source: .Urine Clean Catch (Midstream)  Final Report (07-01-20 @ 23:01):    No growth        RADIOLOGY & ADDITIONAL TESTS:        Medications:  aspirin  chewable 81 milliGRAM(s) Oral daily  atorvastatin 40 milliGRAM(s) Oral at bedtime  calcium acetate 667 milliGRAM(s) Oral daily  chlorhexidine 4% Liquid 1 Application(s) Topical <User Schedule>  folic acid 1 milliGRAM(s) Oral daily  furosemide    Tablet 20 milliGRAM(s) Oral <User Schedule>  latanoprost 0.005% Ophthalmic Solution 1 Drop(s) Both EYES at bedtime  metoprolol succinate ER 50 milliGRAM(s) Oral daily  pantoprazole  Injectable 40 milliGRAM(s) IV Push two times a day  sacubitril 24 mG/valsartan 26 mG 1 Tablet(s) Oral two times a day  sertraline 50 milliGRAM(s) Oral daily  timolol 0.5% Solution 1 Drop(s) Both EYES two times a day

## 2020-07-02 NOTE — PROGRESS NOTE ADULT - ASSESSMENT
83y/o F with HFrEF, DVT/PE on eliquis, HTN, CKD, hx of anemia presents to ED with possible melena and bloody stools.     #R/o Upper GI bleed vs. hemorrhoids  - DDX: UGIB more likely with melena, patient has not had any bleeding since being here, history of diverticulosis but no bright red blood  **** NO BLOOD TRANSFUSIONS AS PER PT WISHES, JEHOVA'S WITNESS ****  - Hb 9.0 this morning, stable  - DAPHNE neg in ED, negative at bedside yesterday  -Per GI, EGD just showed gastritis; will need colonoscopy, hopefully tomorrow  - hold PO iron & eliquis (no NSAIDs)- last eliquis dose 6/30  - protonix IV BID  - Advance to clears  -f/u GI    #CKD4  - Cr @ baseline at 2.1, monitor and avoid nephrotoxins    #Hx of PE/DVT  - hold eliquis for now    #HFrEF- c/w lasix TThSaSu, toprol 50, entrestor 24-26, asa 81  #HDL- c/w atorvastatin 40  #Glaucoma- c/w eye drops  #Likely folic acid def- c/w supplement    #MISC  - DVT ppx: SCD  - GI ppx: PPI BID  - Diet: Clears for now  - Activity: as tolerated  - Dispo: acute, f/u GI recs, possible colonoscopy tomorrow? 83y/o F with HFrEF, DVT/PE on eliquis, HTN, CKD, hx of anemia presents to ED with possible melena and bloody stools.     #R/o Upper GI bleed vs. hemorrhoids  - DDX: UGIB more likely with melena, patient has not had any bleeding since being here, history of diverticulosis but no bright red blood  **** NO BLOOD TRANSFUSIONS AS PER PT WISHES, JEHOVA'S WITNESS ****  - Hb 9.0 this morning, stable  - DAPHNE neg in ED, negative at bedside yesterday  -Per GI, EGD just showed gastritis; will need colonoscopy tomorrow  - hold PO iron & eliquis (no NSAIDs)- last eliquis dose 6/30  - protonix IV BID  - Advance to clears, npo at midnight  -golytely 4L starting at 4pm, dulcolax 20mg po once at 10pm    #CKD4  - Cr @ baseline at 2.1, monitor and avoid nephrotoxins    #Hx of PE/DVT  - hold eliquis for now    #HFrEF- c/w lasix TThSaSu, toprol 50, entrestor 24-26, asa 81  #HDL- c/w atorvastatin 40  #Glaucoma- c/w eye drops  #Likely folic acid def- c/w supplement    #MISC  - DVT ppx: SCD  - GI ppx: PPI BID  - Diet: Clears for now, npo at midnight  - Activity: as tolerated  - Dispo: acute, f/u GI recs, colonoscopy tomorrow

## 2020-07-02 NOTE — PROGRESS NOTE ADULT - ATTENDING COMMENTS
Patient was seen independently. Latest vital signs and labs were reviewed. Case was discussed with house staff. Agree with resident's assessment and plan with following additions/modifications.     Patient denies any headache, any vision complaints, runny nose, fever, chills, sore throat. Denies chest pain, shortness of breath, palpitation. Denies   nausea, vomiting, abdominal pain, diarrhoea, Denies urinary burning, urgency, frequency, dysuria. Denies weakness in any part of the body or numbness.At least 10 systems were reviewed in ROS. All systems reviewed  are within normal limits except for the complaints as described in Subjective.       Not in acute distress  General: No pallor, No icterus, afebrile  HEENT: No JVD, no Bruit.  Heart: S1+S2 audible  Lungs: bilateral  fair air entry, no wheezing, no crepitations.  Abdomen: Soft, non-tender, non-distended , no  rigidity or guarding.  CNS: Grossly intact, sensations intact.  Extremities:  No edema            ASSESSMENT & PLAN:   Melena secondary to gastrointestinal bleeding   Plan for EGD today  Not a candidate for BT as patient  is   JEHOVA'S WITNESS  hemoglobin/hematocrit  stable. hemoglobin improved to 9 today   Monitor H/H Q 12 hrs  Hold eliquis . Continue with ASA and anti-hypertension meds  Monitor BP and HR closely.  GI Prophylaxis with Protonix 40mg intravenous push  twice daily .  Chronic renal disease  stage 4 -at baseline  Moderate to high risk  for intermediate to low risk procedure  continue with ASA 81mg, Lipitor 40mg QHS, Entresto 24/26mg Q12, Lasix 20 mg Q24, Toprol 50mg XL  Discharge Disposition: Home when stable . Plan for colonoscopy 07/03 .

## 2020-07-03 ENCOUNTER — RESULT REVIEW (OUTPATIENT)
Age: 85
End: 2020-07-03

## 2020-07-03 ENCOUNTER — TRANSCRIPTION ENCOUNTER (OUTPATIENT)
Age: 85
End: 2020-07-03

## 2020-07-03 LAB
ALBUMIN SERPL ELPH-MCNC: 3.6 G/DL — SIGNIFICANT CHANGE UP (ref 3.5–5.2)
ALP SERPL-CCNC: 130 U/L — HIGH (ref 30–115)
ALT FLD-CCNC: 15 U/L — SIGNIFICANT CHANGE UP (ref 0–41)
ANION GAP SERPL CALC-SCNC: 18 MMOL/L — HIGH (ref 7–14)
AST SERPL-CCNC: 29 U/L — SIGNIFICANT CHANGE UP (ref 0–41)
BASOPHILS # BLD AUTO: 0.03 K/UL — SIGNIFICANT CHANGE UP (ref 0–0.2)
BASOPHILS NFR BLD AUTO: 0.3 % — SIGNIFICANT CHANGE UP (ref 0–1)
BILIRUB SERPL-MCNC: 0.3 MG/DL — SIGNIFICANT CHANGE UP (ref 0.2–1.2)
BUN SERPL-MCNC: 29 MG/DL — HIGH (ref 10–20)
CALCIUM SERPL-MCNC: 9.3 MG/DL — SIGNIFICANT CHANGE UP (ref 8.5–10.1)
CHLORIDE SERPL-SCNC: 106 MMOL/L — SIGNIFICANT CHANGE UP (ref 98–110)
CO2 SERPL-SCNC: 19 MMOL/L — SIGNIFICANT CHANGE UP (ref 17–32)
CREAT SERPL-MCNC: 2.2 MG/DL — HIGH (ref 0.7–1.5)
EOSINOPHIL # BLD AUTO: 0.36 K/UL — SIGNIFICANT CHANGE UP (ref 0–0.7)
EOSINOPHIL NFR BLD AUTO: 3.8 % — SIGNIFICANT CHANGE UP (ref 0–8)
GLUCOSE SERPL-MCNC: 76 MG/DL — SIGNIFICANT CHANGE UP (ref 70–99)
HCT VFR BLD CALC: 31.7 % — LOW (ref 37–47)
HGB BLD-MCNC: 9.7 G/DL — LOW (ref 12–16)
IMM GRANULOCYTES NFR BLD AUTO: 0.5 % — HIGH (ref 0.1–0.3)
LYMPHOCYTES # BLD AUTO: 2.31 K/UL — SIGNIFICANT CHANGE UP (ref 1.2–3.4)
LYMPHOCYTES # BLD AUTO: 24.5 % — SIGNIFICANT CHANGE UP (ref 20.5–51.1)
MAGNESIUM SERPL-MCNC: 1.9 MG/DL — SIGNIFICANT CHANGE UP (ref 1.8–2.4)
MCHC RBC-ENTMCNC: 28.3 PG — SIGNIFICANT CHANGE UP (ref 27–31)
MCHC RBC-ENTMCNC: 30.6 G/DL — LOW (ref 32–37)
MCV RBC AUTO: 92.4 FL — SIGNIFICANT CHANGE UP (ref 81–99)
MONOCYTES # BLD AUTO: 1.1 K/UL — HIGH (ref 0.1–0.6)
MONOCYTES NFR BLD AUTO: 11.7 % — HIGH (ref 1.7–9.3)
NEUTROPHILS # BLD AUTO: 5.57 K/UL — SIGNIFICANT CHANGE UP (ref 1.4–6.5)
NEUTROPHILS NFR BLD AUTO: 59.2 % — SIGNIFICANT CHANGE UP (ref 42.2–75.2)
NRBC # BLD: 0 /100 WBCS — SIGNIFICANT CHANGE UP (ref 0–0)
PLATELET # BLD AUTO: 335 K/UL — SIGNIFICANT CHANGE UP (ref 130–400)
POTASSIUM SERPL-MCNC: 4.6 MMOL/L — SIGNIFICANT CHANGE UP (ref 3.5–5)
POTASSIUM SERPL-SCNC: 4.6 MMOL/L — SIGNIFICANT CHANGE UP (ref 3.5–5)
PROT SERPL-MCNC: 6.7 G/DL — SIGNIFICANT CHANGE UP (ref 6–8)
RBC # BLD: 3.43 M/UL — LOW (ref 4.2–5.4)
RBC # FLD: 14.8 % — HIGH (ref 11.5–14.5)
SODIUM SERPL-SCNC: 143 MMOL/L — SIGNIFICANT CHANGE UP (ref 135–146)
WBC # BLD: 9.42 K/UL — SIGNIFICANT CHANGE UP (ref 4.8–10.8)
WBC # FLD AUTO: 9.42 K/UL — SIGNIFICANT CHANGE UP (ref 4.8–10.8)

## 2020-07-03 PROCEDURE — 45380 COLONOSCOPY AND BIOPSY: CPT | Mod: XU

## 2020-07-03 PROCEDURE — 45385 COLONOSCOPY W/LESION REMOVAL: CPT

## 2020-07-03 PROCEDURE — 99233 SBSQ HOSP IP/OBS HIGH 50: CPT

## 2020-07-03 PROCEDURE — 88305 TISSUE EXAM BY PATHOLOGIST: CPT | Mod: 26

## 2020-07-03 RX ORDER — ACETAMINOPHEN 500 MG
650 TABLET ORAL EVERY 6 HOURS
Refills: 0 | Status: DISCONTINUED | OUTPATIENT
Start: 2020-07-03 | End: 2020-07-05

## 2020-07-03 RX ORDER — LABETALOL HCL 100 MG
10 TABLET ORAL ONCE
Refills: 0 | Status: COMPLETED | OUTPATIENT
Start: 2020-07-03 | End: 2020-07-03

## 2020-07-03 RX ADMIN — Medication 1 MILLIGRAM(S): at 12:42

## 2020-07-03 RX ADMIN — SACUBITRIL AND VALSARTAN 1 TABLET(S): 24; 26 TABLET, FILM COATED ORAL at 05:41

## 2020-07-03 RX ADMIN — Medication 667 MILLIGRAM(S): at 12:42

## 2020-07-03 RX ADMIN — SACUBITRIL AND VALSARTAN 1 TABLET(S): 24; 26 TABLET, FILM COATED ORAL at 17:35

## 2020-07-03 RX ADMIN — Medication 81 MILLIGRAM(S): at 12:42

## 2020-07-03 RX ADMIN — PANTOPRAZOLE SODIUM 40 MILLIGRAM(S): 20 TABLET, DELAYED RELEASE ORAL at 05:42

## 2020-07-03 RX ADMIN — ATORVASTATIN CALCIUM 40 MILLIGRAM(S): 80 TABLET, FILM COATED ORAL at 21:16

## 2020-07-03 RX ADMIN — CHLORHEXIDINE GLUCONATE 1 APPLICATION(S): 213 SOLUTION TOPICAL at 05:41

## 2020-07-03 RX ADMIN — Medication 1 DROP(S): at 17:35

## 2020-07-03 RX ADMIN — Medication 50 MILLIGRAM(S): at 05:41

## 2020-07-03 RX ADMIN — PANTOPRAZOLE SODIUM 40 MILLIGRAM(S): 20 TABLET, DELAYED RELEASE ORAL at 17:35

## 2020-07-03 RX ADMIN — SERTRALINE 50 MILLIGRAM(S): 25 TABLET, FILM COATED ORAL at 12:42

## 2020-07-03 RX ADMIN — LATANOPROST 1 DROP(S): 0.05 SOLUTION/ DROPS OPHTHALMIC; TOPICAL at 21:16

## 2020-07-03 RX ADMIN — Medication 10 MILLIGRAM(S): at 09:12

## 2020-07-03 RX ADMIN — Medication 650 MILLIGRAM(S): at 23:30

## 2020-07-03 RX ADMIN — Medication 1 DROP(S): at 05:42

## 2020-07-03 NOTE — PROGRESS NOTE ADULT - SUBJECTIVE AND OBJECTIVE BOX
Progress Note:  Provider Speciality                            Hospitalist      RACHEL SUMMERS [id number] 84y Female     CHIEF PRESENTING COMPLAINT:  Patient is a 84y old  Female who presents with a chief complaint of r/o GI Bleed (02 Jul 2020 10:08)        SUBJECTIVE:  Patient was seen and examined at bedside. Reports improvement in  presenting complaint. No significant overnight events reported.     HISTORY OF PRESENTING ILLNESS:  HPI:  85y/o F with PMH of PE/DVT on Eliquis, DLD, HFpEF (53%) s/p PPM, gout, HTN, CKD3-4 presents to ED after having black and bloody stools for 5 days. Patient noted black stools starting on June 25th, and since 6/29 had x2 episode of bright red blood streaked BMs. Last BM was 6/30. Has had associated lower abd pain, radiating to L flank for 2wks - but mostly associated with full bladder and relieved after urinates. Pain is mild and not a/w food intake. Does endorse dysuria. Today had sharper L flank pain in AM as well. Recently pt started on iron supplements 6/23 but stopped 3d ago. She admits to some inc weakness and mild dizziness since this started. No other CP, SOB, fever/chills, nausea/vomiting/diarrhea, back pain, headaches. Denies NSAID use. States had colonoscopy in [place] in January that showed diverticulosis but nothing else she can remember. Never had EGD.    *** PT IS A JEHOVA'S WITNESS - REFUSES ANY BLOOD PRODUCTS EVEN IF RISK OF DEATH ***    In ED: Tmax 99.5, HR 64, /65, RR18, SpO2 98%. DAPHNE showed brown stools  Labs: Hbg 9.3 (@ baseline), INR 1.43, Cr 2.2 (@ baseline). UA neg for infection or blood.   CT abd/pelvis neg for stones or signs of infection.   Admit for Hbg monitoring. (30 Jun 2020 18:02)        REVIEW OF SYSTEMS:  Patient denies any headache, any vision complaints, runny nose, fever, chills, sore throat. Denies chest pain, shortness of breath, palpitation. Denies nausea, vomiting, abdominal pain, diarrhoea, Denies urinary burning, urgency, frequency, dysuria. Denies weakness in any part of the body or numbness.   At least 10 systems were reviewed in ROS. All systems reviewed  are within normal limits except for the complaints as described in Subjective.    PAST MEDICAL & SURGICAL HISTORY:  PAST MEDICAL & SURGICAL HISTORY:  DVT, lower extremity  Rheumatoid arthritis  Diastolic heart failure  Diverticulitis: sigmoid  Gout  Hypertension  Pacemaker  Chronic kidney disease  History of hysterectomy  History of tonsillectomy  History of appendectomy  Artificial pacemaker          VITAL SIGNS:  Vital Signs Last 24 Hrs  T(C): 36.4 (03 Jul 2020 11:12), Max: 37.5 (02 Jul 2020 15:32)  T(F): 97.6 (03 Jul 2020 11:12), Max: 99.5 (02 Jul 2020 15:32)  HR: 60 (03 Jul 2020 11:27) (59 - 75)  BP: 153/72 (03 Jul 2020 11:27) (137/69 - 190/90)  BP(mean): --  RR: 20 (03 Jul 2020 11:27) (17 - 21)  SpO2: 99% (03 Jul 2020 11:27) (98% - 100%)          PHYSICAL EXAMINATION:  Not in acute distress  General: No pallor, no icterus  HEENT:   EOMI, no JVD.  Heart: S1+S2 audible  Lungs: bilateral  fair air entry, no wheezing, no crepitations.  Abdomen: Soft, non-tender, non-distended , no  rigidity or guarding.  CNS: Awake alert, CN  grossly intact.  Extremities:  No edema            CONSULTS:  Consultant(s) Notes Reviewed by me.   Care Discussed with Consultants/Other Providers where required.        MEDICATIONS:  MEDICATIONS  (STANDING):  aspirin  chewable 81 milliGRAM(s) Oral daily  atorvastatin 40 milliGRAM(s) Oral at bedtime  calcium acetate 667 milliGRAM(s) Oral daily  chlorhexidine 4% Liquid 1 Application(s) Topical <User Schedule>  folic acid 1 milliGRAM(s) Oral daily  furosemide    Tablet 20 milliGRAM(s) Oral <User Schedule>  latanoprost 0.005% Ophthalmic Solution 1 Drop(s) Both EYES at bedtime  metoprolol succinate ER 50 milliGRAM(s) Oral daily  pantoprazole  Injectable 40 milliGRAM(s) IV Push two times a day  sacubitril 24 mG/valsartan 26 mG 1 Tablet(s) Oral two times a day  sertraline 50 milliGRAM(s) Oral daily  timolol 0.5% Solution 1 Drop(s) Both EYES two times a day    MEDICATIONS  (PRN):            ASSESSMENT:        ASSESSMENT & PLAN:   Melena secondary to gastrointestinal bleeding   Plan for EGD today  Not a candidate for BT as patient  is   JEHOVA'S WITNESS  hemoglobin/hematocrit  stable. hemoglobin improved to 9 today   Monitor H/H Q 12 hrs  Hold eliquis . Continue with ASA and anti-hypertension meds  Monitor BP and HR closely.  GI Prophylaxis with Protonix 40mg intravenous push  twice daily .  Chronic renal disease  stage 4 -at baseline  Moderate to high risk  for intermediate to low risk procedure  continue with ASA 81mg, Lipitor 40mg QHS, Entresto 24/26mg Q12, Lasix 20 mg Q24, Toprol 50mg XL  Discharge Disposition: Home when stable . Plan for colonoscopy today . anticipate discharge tomorrow

## 2020-07-04 LAB
ALBUMIN SERPL ELPH-MCNC: 3.3 G/DL — LOW (ref 3.5–5.2)
ALP SERPL-CCNC: 120 U/L — HIGH (ref 30–115)
ALT FLD-CCNC: 13 U/L — SIGNIFICANT CHANGE UP (ref 0–41)
ANION GAP SERPL CALC-SCNC: 15 MMOL/L — HIGH (ref 7–14)
AST SERPL-CCNC: 30 U/L — SIGNIFICANT CHANGE UP (ref 0–41)
BASOPHILS # BLD AUTO: 0.03 K/UL — SIGNIFICANT CHANGE UP (ref 0–0.2)
BASOPHILS NFR BLD AUTO: 0.4 % — SIGNIFICANT CHANGE UP (ref 0–1)
BILIRUB SERPL-MCNC: 0.3 MG/DL — SIGNIFICANT CHANGE UP (ref 0.2–1.2)
BUN SERPL-MCNC: 26 MG/DL — HIGH (ref 10–20)
CALCIUM SERPL-MCNC: 8.3 MG/DL — LOW (ref 8.5–10.1)
CHLORIDE SERPL-SCNC: 106 MMOL/L — SIGNIFICANT CHANGE UP (ref 98–110)
CO2 SERPL-SCNC: 17 MMOL/L — SIGNIFICANT CHANGE UP (ref 17–32)
CREAT SERPL-MCNC: 2.3 MG/DL — HIGH (ref 0.7–1.5)
EOSINOPHIL # BLD AUTO: 0.35 K/UL — SIGNIFICANT CHANGE UP (ref 0–0.7)
EOSINOPHIL NFR BLD AUTO: 4.1 % — SIGNIFICANT CHANGE UP (ref 0–8)
GLUCOSE SERPL-MCNC: 107 MG/DL — HIGH (ref 70–99)
HCT VFR BLD CALC: 31.1 % — LOW (ref 37–47)
HGB BLD-MCNC: 9.4 G/DL — LOW (ref 12–16)
IMM GRANULOCYTES NFR BLD AUTO: 0.2 % — SIGNIFICANT CHANGE UP (ref 0.1–0.3)
LYMPHOCYTES # BLD AUTO: 2.14 K/UL — SIGNIFICANT CHANGE UP (ref 1.2–3.4)
LYMPHOCYTES # BLD AUTO: 25.3 % — SIGNIFICANT CHANGE UP (ref 20.5–51.1)
MCHC RBC-ENTMCNC: 28 PG — SIGNIFICANT CHANGE UP (ref 27–31)
MCHC RBC-ENTMCNC: 30.2 G/DL — LOW (ref 32–37)
MCV RBC AUTO: 92.6 FL — SIGNIFICANT CHANGE UP (ref 81–99)
MONOCYTES # BLD AUTO: 0.99 K/UL — HIGH (ref 0.1–0.6)
MONOCYTES NFR BLD AUTO: 11.7 % — HIGH (ref 1.7–9.3)
NEUTROPHILS # BLD AUTO: 4.92 K/UL — SIGNIFICANT CHANGE UP (ref 1.4–6.5)
NEUTROPHILS NFR BLD AUTO: 58.3 % — SIGNIFICANT CHANGE UP (ref 42.2–75.2)
NRBC # BLD: 0 /100 WBCS — SIGNIFICANT CHANGE UP (ref 0–0)
PLATELET # BLD AUTO: 298 K/UL — SIGNIFICANT CHANGE UP (ref 130–400)
POTASSIUM SERPL-MCNC: 4.8 MMOL/L — SIGNIFICANT CHANGE UP (ref 3.5–5)
POTASSIUM SERPL-SCNC: 4.8 MMOL/L — SIGNIFICANT CHANGE UP (ref 3.5–5)
PROT SERPL-MCNC: 6.2 G/DL — SIGNIFICANT CHANGE UP (ref 6–8)
RBC # BLD: 3.36 M/UL — LOW (ref 4.2–5.4)
RBC # FLD: 14.8 % — HIGH (ref 11.5–14.5)
SODIUM SERPL-SCNC: 138 MMOL/L — SIGNIFICANT CHANGE UP (ref 135–146)
WBC # BLD: 8.45 K/UL — SIGNIFICANT CHANGE UP (ref 4.8–10.8)
WBC # FLD AUTO: 8.45 K/UL — SIGNIFICANT CHANGE UP (ref 4.8–10.8)

## 2020-07-04 PROCEDURE — 99233 SBSQ HOSP IP/OBS HIGH 50: CPT

## 2020-07-04 RX ORDER — OXYCODONE HYDROCHLORIDE 5 MG/1
5 TABLET ORAL ONCE
Refills: 0 | Status: DISCONTINUED | OUTPATIENT
Start: 2020-07-04 | End: 2020-07-04

## 2020-07-04 RX ORDER — APIXABAN 2.5 MG/1
2.5 TABLET, FILM COATED ORAL
Refills: 0 | Status: DISCONTINUED | OUTPATIENT
Start: 2020-07-04 | End: 2020-07-05

## 2020-07-04 RX ADMIN — SACUBITRIL AND VALSARTAN 1 TABLET(S): 24; 26 TABLET, FILM COATED ORAL at 17:38

## 2020-07-04 RX ADMIN — SACUBITRIL AND VALSARTAN 1 TABLET(S): 24; 26 TABLET, FILM COATED ORAL at 05:06

## 2020-07-04 RX ADMIN — Medication 1 MILLIGRAM(S): at 11:18

## 2020-07-04 RX ADMIN — Medication 50 MILLIGRAM(S): at 05:06

## 2020-07-04 RX ADMIN — LATANOPROST 1 DROP(S): 0.05 SOLUTION/ DROPS OPHTHALMIC; TOPICAL at 21:13

## 2020-07-04 RX ADMIN — Medication 81 MILLIGRAM(S): at 11:18

## 2020-07-04 RX ADMIN — PANTOPRAZOLE SODIUM 40 MILLIGRAM(S): 20 TABLET, DELAYED RELEASE ORAL at 17:38

## 2020-07-04 RX ADMIN — Medication 20 MILLIGRAM(S): at 11:18

## 2020-07-04 RX ADMIN — OXYCODONE HYDROCHLORIDE 5 MILLIGRAM(S): 5 TABLET ORAL at 12:03

## 2020-07-04 RX ADMIN — PANTOPRAZOLE SODIUM 40 MILLIGRAM(S): 20 TABLET, DELAYED RELEASE ORAL at 05:06

## 2020-07-04 RX ADMIN — Medication 667 MILLIGRAM(S): at 11:18

## 2020-07-04 RX ADMIN — Medication 1 DROP(S): at 17:41

## 2020-07-04 RX ADMIN — SERTRALINE 50 MILLIGRAM(S): 25 TABLET, FILM COATED ORAL at 11:18

## 2020-07-04 RX ADMIN — Medication 1 DROP(S): at 05:06

## 2020-07-04 RX ADMIN — ATORVASTATIN CALCIUM 40 MILLIGRAM(S): 80 TABLET, FILM COATED ORAL at 21:13

## 2020-07-04 NOTE — DIETITIAN INITIAL EVALUATION ADULT. - PERTINENT LABORATORY DATA
(7/4) Na-138, K-4.8, CL-106, BUN-26, Cr-2.3, GFR-22, Glucose-107mg/dL, Corrected Ca-8.9 (WNL), H/H-9.4/31.1

## 2020-07-04 NOTE — DIETITIAN INITIAL EVALUATION ADULT. - ADD RECOMMEND
I contacted the patient's physician, name Dr. Moraes (spectra link [phone]). Per physician, the plan is to continue a clear liquid diet secondary to recent GI bleed. I informed the physician, once medically feasible, please adhere to the nutritional recommendation I documented.

## 2020-07-04 NOTE — DIETITIAN INITIAL EVALUATION ADULT. - FACTORS AFF FOOD INTAKE
Three attempts were made to interview the patient but were unsuccessful since they are asleep and unarousable. I spoke to the RN, who states the patient consume >75% of clear liquid diet but c/o abdominal pain. Denies chewing and swallowing difficulty. Reports having a bowel movement.

## 2020-07-04 NOTE — PROGRESS NOTE ADULT - SUBJECTIVE AND OBJECTIVE BOX
Progress Note:  Provider Speciality                            Hospitalist      RACHEL SUMMERS [id number] 84y Female     CHIEF PRESENTING COMPLAINT:  Patient is a 84y old  Female who presents with a chief complaint of r/o GI Bleed (02 Jul 2020 10:08)        SUBJECTIVE:  Patient was seen and examined at bedside. Reports 3 episodes of hematochezia after scope in 24 hrs    HISTORY OF PRESENTING ILLNESS:  HPI:  85y/o F with PMH of PE/DVT on Eliquis, DLD, HFpEF (53%) s/p PPM, gout, HTN, CKD3-4 presents to ED after having black and bloody stools for 5 days. Patient noted black stools starting on June 25th, and since 6/29 had x2 episode of bright red blood streaked BMs. Last BM was 6/30. Has had associated lower abd pain, radiating to L flank for 2wks - but mostly associated with full bladder and relieved after urinates. Pain is mild and not a/w food intake. Does endorse dysuria. Today had sharper L flank pain in AM as well. Recently pt started on iron supplements 6/23 but stopped 3d ago. She admits to some inc weakness and mild dizziness since this started. No other CP, SOB, fever/chills, nausea/vomiting/diarrhea, back pain, headaches. Denies NSAID use. States had colonoscopy in [place] in January that showed diverticulosis but nothing else she can remember. Never had EGD.    *** PT IS A JEHOVA'S WITNESS - REFUSES ANY BLOOD PRODUCTS EVEN IF RISK OF DEATH ***    In ED: Tmax 99.5, HR 64, /65, RR18, SpO2 98%. DAPHNE showed brown stools  Labs: Hbg 9.3 (@ baseline), INR 1.43, Cr 2.2 (@ baseline). UA neg for infection or blood.   CT abd/pelvis neg for stones or signs of infection.   Admit for Hbg monitoring. (30 Jun 2020 18:02)        REVIEW OF SYSTEMS:  Patient denies any headache, any vision complaints, runny nose, fever, chills, sore throat. Denies chest pain, shortness of breath, palpitation. Denies nausea, vomiting, abdominal pain, diarrhoea, Denies urinary burning, urgency, frequency, dysuria. Denies weakness in any part of the body or numbness.   At least 10 systems were reviewed in ROS. All systems reviewed  are within normal limits except for the complaints as described in Subjective.    PAST MEDICAL & SURGICAL HISTORY:  PAST MEDICAL & SURGICAL HISTORY:  DVT, lower extremity  Rheumatoid arthritis  Diastolic heart failure  Diverticulitis: sigmoid  Gout  Hypertension  Pacemaker  Chronic kidney disease  History of hysterectomy  History of tonsillectomy  History of appendectomy  Artificial pacemaker          VITAL SIGNS:  Vital Signs Last 24 Hrs  T(C): 37.1 (04 Jul 2020 08:08), Max: 37.1 (04 Jul 2020 08:08)  T(F): 98.8 (04 Jul 2020 08:08), Max: 98.8 (04 Jul 2020 08:08)  HR: 87 (04 Jul 2020 08:08) (60 - 87)  BP: 101/56 (04 Jul 2020 08:08) (101/56 - 180/82)  BP(mean): --  RR: 18 (04 Jul 2020 08:08) (15 - 18)  SpO2: --        PHYSICAL EXAMINATION:  Not in acute distress  General: No pallor, no icterus  HEENT:   EOMI, no JVD.  Heart: S1+S2 audible  Lungs: bilateral  fair air entry, no wheezing, no crepitations.  Abdomen: Soft, non-tender, non-distended , no  rigidity or guarding.  CNS: Awake alert, CN  grossly intact.  Extremities:  No edema            CONSULTS:  Consultant(s) Notes Reviewed by me.   Care Discussed with Consultants/Other Providers where required.        MEDICATIONS:  MEDICATIONS  (STANDING):  aspirin  chewable 81 milliGRAM(s) Oral daily  atorvastatin 40 milliGRAM(s) Oral at bedtime  calcium acetate 667 milliGRAM(s) Oral daily  chlorhexidine 4% Liquid 1 Application(s) Topical <User Schedule>  folic acid 1 milliGRAM(s) Oral daily  furosemide    Tablet 20 milliGRAM(s) Oral <User Schedule>  latanoprost 0.005% Ophthalmic Solution 1 Drop(s) Both EYES at bedtime  metoprolol succinate ER 50 milliGRAM(s) Oral daily  pantoprazole  Injectable 40 milliGRAM(s) IV Push two times a day  sacubitril 24 mG/valsartan 26 mG 1 Tablet(s) Oral two times a day  sertraline 50 milliGRAM(s) Oral daily  timolol 0.5% Solution 1 Drop(s) Both EYES two times a day    MEDICATIONS  (PRN):            ASSESSMENT:        ASSESSMENT & PLAN:   Melena secondary to gastrointestinal bleeding   EGD- non-erosive gastritis.  Colonoscopy- 3 polys removed  Reports 3 episodes of melena/hematochezia after scope. hemoglobin/hematocrit pending for today  Not a candidate for BT as patient  is   JEHOVA'S WITNESS  Monitor H/H Q 12 hrs  Holding Eliquis .Restart if hemoglobin/hematocrit stable for today. Continue with ASA and anti-hypertension meds  Monitor BP and HR closely.  GI Prophylaxis with Protonix 40mg intravenous push  twice daily .  Chronic renal disease  stage 4 -at baseline  Moderate to high risk  for intermediate to low risk procedure  continue with ASA 81mg, Lipitor 40mg QHS, Entresto 24/26mg Q12, Lasix 20 mg Q24, Toprol 50mg XL  Discharge Disposition: Home when stable . Reports 3 episodes of melena/hematochezia after scope. hemoglobin/hematocrit pending for today

## 2020-07-04 NOTE — DIETITIAN INITIAL EVALUATION ADULT. - RD TO REMAIN AVAILABLE
yes/Intervention: 1.Meals and Snacks  Monitor/Evaluate: Diet order, energy intake, nutrition focused physical findings, renal and anemia profile

## 2020-07-04 NOTE — DIETITIAN INITIAL EVALUATION ADULT. - ENERGY NEEDS
Estimated Calorie Needs: [id number] x AF 1.2-1.8=[id number]/day  Estimated Protein Needs: 52-65grams/day (0.8-1grams/kg of admit weight) -Due to CKD  Estimated Fluid Needs: Fluids per LIP -Due to CHF

## 2020-07-04 NOTE — DIETITIAN INITIAL EVALUATION ADULT. - OTHER INFO
Dx: 85y/o female with pmhx noted above presented with melena, black and bloody stools for 5 days. S/p EGD (7/2), results show non erosive gastritis.  S/p colonoscopy (7/3), results show diverticulosis of the whole colon; polyp (4mm) in the cecum (s/p polypectomy); polyps (5mm) in the descending colon (s/p polypectomy); polyps (7mm) in the colon (s/p polypectomy) and internal hemorrhoids. Otherwise normal colon. Skin is intact (Kedar Score-19).      Subjective Data: Three attempts were made to interview the patient but were unsuccessful since the patient is asleep and unarousable. I contacted the patient's son, name Drake via telephone [phone]. Per son, the patient followed a low sodium diet at home; consumed three small meals at 100%; denies a MVI occasionally.

## 2020-07-04 NOTE — PROGRESS NOTE ADULT - ASSESSMENT
85y/o F with PMH of PE/DVT(for 1 year) on Eliquis, DLD, HFpEF (53%) s/p CRT-D, gout, HTN, CKD3-4 presents to ED after having black and bloody stools for 5 days.      #melena ( resolved ) / new small amount of fresh flood since yesterday ( hemorrhoidal versus post polypectomy ?)  patient is s/p colonoscopy 7/3   Diverticulosis of the whole colon.  Polyp (4 mm) in the cecum. (Polypectomy).  Polyps (5 mm) in the descending colon. (Polypectomy).  Polyp (7 mm) in the colon. (Polypectomy).  Internal hemorrhoids.  Otherwise normal colon.   patient is s/p EGD 7/2 showing non erosive gastritis   patient complained of small amount of fresh blood on stool 3 times since yesterday .   HD stable.   No drop in HGB     REC:   clear liquid diet   trend CBC  can be on heparin drip , switch to Eliquis if  HGB remain   stable 83y/o F with PMH of PE/DVT(for 1 year) on Eliquis, DLD, HFpEF (53%) s/p CRT-D, gout, HTN, CKD3-4 presents to ED after having black and bloody stools for 5 days.      #melena ( resolved ) / new small amount of fresh blood since yesterday ( hemorrhoidal versus post polypectomy ?)  patient is s/p colonoscopy 7/3   Diverticulosis of the whole colon.  Polyp (4 mm) in the cecum. (Polypectomy).  Polyps (5 mm) in the descending colon. (Polypectomy).  Polyp (7 mm) in the colon. (Polypectomy).  Internal hemorrhoids.  Otherwise normal colon.   patient is s/p EGD 7/2 showing non erosive gastritis   patient complained of small amount of fresh blood on stool 3 times since yesterday .   HD stable.   No drop in HGB     REC:   clear liquid diet   trend CBC  can be on heparin drip , switch to Eliquis if  HGB remain   stable

## 2020-07-04 NOTE — PROGRESS NOTE ADULT - SUBJECTIVE AND OBJECTIVE BOX
Gastroenterology progress note:     Patient is a 84y old  Female who presents with a chief complaint of r/o GI Bleed (04 Jul 2020 12:28)       Admitted on: 06-30-20    We are following the patient for: melena      Interval History: patient is s/p colonoscopy 7/3   Diverticulosis of the whole colon.  Polyp (4 mm) in the cecum. (Polypectomy).  Polyps (5 mm) in the descending colon. (Polypectomy).  Polyp (7 mm) in the colon. (Polypectomy).  Internal hemorrhoids.  Otherwise normal colon.   patient is s/p EGD 7/2 showing non erosive gastritis   patient complained of small amount of fresh blood on stool 3 times since yesterday . HD stable. No drop in HGB          Patient's medical problems are  stable         PAST MEDICAL & SURGICAL HISTORY:  DVT, lower extremity  Rheumatoid arthritis  Diastolic heart failure  Diverticulitis: sigmoid  Gout  Hypertension  Pacemaker  Chronic kidney disease  History of hysterectomy  History of tonsillectomy  History of appendectomy  Artificial pacemaker      MEDICATIONS  (STANDING):  aspirin  chewable 81 milliGRAM(s) Oral daily  atorvastatin 40 milliGRAM(s) Oral at bedtime  calcium acetate 667 milliGRAM(s) Oral daily  chlorhexidine 4% Liquid 1 Application(s) Topical <User Schedule>  folic acid 1 milliGRAM(s) Oral daily  furosemide    Tablet 20 milliGRAM(s) Oral <User Schedule>  latanoprost 0.005% Ophthalmic Solution 1 Drop(s) Both EYES at bedtime  metoprolol succinate ER 50 milliGRAM(s) Oral daily  pantoprazole  Injectable 40 milliGRAM(s) IV Push two times a day  sacubitril 24 mG/valsartan 26 mG 1 Tablet(s) Oral two times a day  sertraline 50 milliGRAM(s) Oral daily  timolol 0.5% Solution 1 Drop(s) Both EYES two times a day    MEDICATIONS  (PRN):  acetaminophen   Tablet .. 650 milliGRAM(s) Oral every 6 hours PRN Moderate Pain (4 - 6)      Allergies  Keflex (Swelling)      Review of Systems:   Cardiovascular:  No Chest Pain, No Palpitations  Respiratory:  No Cough, No Dyspnea  Gastrointestinal:  As described in HPI    Physical Examination:  T(C): 37.1 (07-04-20 @ 08:08), Max: 37.1 (07-04-20 @ 08:08)  HR: 87 (07-04-20 @ 08:08) (60 - 87)  BP: 101/56 (07-04-20 @ 08:08) (101/56 - 178/85)  RR: 18 (07-04-20 @ 08:08) (15 - 18)  SpO2: --      07-03-20 @ 07:01  -  07-04-20 @ 07:00  --------------------------------------------------------  IN: 480 mL / OUT: 0 mL / NET: 480 mL    07-04-20 @ 07:01  -  07-04-20 @ 13:28  --------------------------------------------------------  IN: 200 mL / OUT: 0 mL / NET: 200 mL      Constitutional: No acute distress.  Respiratory:  No signs of respiratory distress. Lung sounds are clear bilaterally.  Cardiovascular:  S1 S2, Regular rate and rhythm.  Abdominal: Abdomen is soft, symmetric, and non-tender without distention.  Bowel sounds are present and normoactive in all four quadrants. No masses, hepatomegaly, or splenomegaly are noted.   Skin: No rashes, No Jaundice.        Data: (reviewed by attending)                        9.4    8.45  )-----------( 298      ( 04 Jul 2020 11:10 )             31.1     Hgb trend:  9.4  07-04-20 @ 11:10  9.7  07-03-20 @ 08:00  9.4  07-02-20 @ 15:59  9.0  07-01-20 @ 17:05      07-01-20 @ 07:01  -  07-02-20 @ 07:00  --------------------------------------------------------  IN: 0 mL      07-04    138  |  106  |  26<H>  ----------------------------<  107<H>  4.8   |  17  |  2.3<H>    Ca    8.3<L>      04 Jul 2020 11:10  Mg     1.9     07-03    TPro  6.2  /  Alb  3.3<L>  /  TBili  0.3  /  DBili  x   /  AST  30  /  ALT  13  /  AlkPhos  120<H>  07-04    Liver panel trend:  TBili 0.3   /   AST 30   /   ALT 13   /   AlkP 120   /   Tptn 6.2   /   Alb 3.3    /   DBili --      07-04  TBili 0.3   /   AST 29   /   ALT 15   /   AlkP 130   /   Tptn 6.7   /   Alb 3.6    /   DBili --      07-03  TBili 0.2   /   AST 26   /   ALT 12   /   AlkP 127   /   Tptn 7.3   /   Alb 4.0    /   DBili --      06-30             Radiology: (reviewed by attending)

## 2020-07-04 NOTE — PROGRESS NOTE ADULT - SUBJECTIVE AND OBJECTIVE BOX
RACHEL SUMMERS  [id number]      Patient is a 84y old  Female who presents with a chief complaint of r/o GI Bleed (04 Jul 2020 11:38)        -PMHx: DVT, lower extremity [Active]  Rheumatoid arthritis [Active]  Diastolic heart failure [Active]  Atrial fibrillation [Inactive]  Diverticulitis [Active]  Gout [Active]  Hypertension [Active]  Pacemaker [Active]  Chronic kidney disease [Active]    -PSHx:History of hysterectomy  History of tonsillectomy  History of appendectomy  Artificial pacemaker      Interval events:  Patient seen and examined at bedside. She had 3 episodes of brbpr yesterday and last night. Denies abd pain/n/v, but is having left flank pain right now.     REVIEW OF SYSTEMS:  CONSTITUTIONAL: No fever, weight loss, or fatigue  RESPIRATORY: No cough, wheezing, chills or hemoptysis; No shortness of breath  CARDIOVASCULAR: No chest pain, palpitations, dizziness, or leg swelling  GASTROINTESTINAL: +left flank pain, +brbpr  NEUROLOGICAL: No headaches  LYMPH NODES: No enlarged glands  MUSCULOSKELETAL: No joint pain or swelling; No muscle, back, or extremity pain      T(C): , Max: 37.1 (07-04-20 @ 08:08)  HR: 87 (07-04-20 @ 08:08)  BP: 101/56 (07-04-20 @ 08:08)  RR: 18 (07-04-20 @ 08:08)  SpO2: --  CAPILLARY BLOOD GLUCOSE        PHYSICAL EXAM:  GENERAL: NAD, well-developed elderly AAF  HEENT:  Atraumatic, Normocephalic; EOMI, PERRLA, conjunctiva pink no pallor, sclera white, Supple, No JVD  CHEST/LUNG: Clear to auscultation bilaterally; No wheeze/crackles  HEART: Regular rate and rhythm; No murmurs  ABDOMEN: Soft, Nontender, Nondistended; Bowel sounds present; left flank pain not ttp  EXTREMITIES:  2+ Peripheral Pulses, No peripheral pitting edema  PSYCH: AAOx3  NEUROLOGY: non-focal  SKIN: No rashes/lesions appreciated        LABS:          9.4  8.45  )-------(298          31.1  N=58.3  L=25.3  MCV=92.6              Microbiology:    Culture - Urine (collected 06-30-20 @ 15:25)  Source: .Urine Clean Catch (Midstream)  Final Report (07-01-20 @ 23:01):    No growth        RADIOLOGY & ADDITIONAL TESTS:  < from: Colonoscopy (07.03.20 @ 09:00) >  Findings:   Excavated lesions Several diverticula were seen in the whole colon.   Protruding lesions A single sessile 4 mm polyp was found in the cecum. A  single-piece polypectomy was performed using a cold forceps. The polyp was  completely removed.   Two sessile 5 mm polyps were found in the descending colon. A single-piece  polypectomy was performed using a cold forceps. The polyps were completely  removed.   A single sessile 7 mm polyp of benign appearance was found. A single-piece  polypectomy was performed using a cold snare. The polyp was completely removed.    Small internal hemorrhoids were noted.   Additional findings Otherwise normal colon.     < end of copied text >        Medications:  acetaminophen   Tablet .. 650 milliGRAM(s) Oral every 6 hours PRN  aspirin  chewable 81 milliGRAM(s) Oral daily  atorvastatin 40 milliGRAM(s) Oral at bedtime  calcium acetate 667 milliGRAM(s) Oral daily  chlorhexidine 4% Liquid 1 Application(s) Topical <User Schedule>  folic acid 1 milliGRAM(s) Oral daily  furosemide    Tablet 20 milliGRAM(s) Oral <User Schedule>  latanoprost 0.005% Ophthalmic Solution 1 Drop(s) Both EYES at bedtime  metoprolol succinate ER 50 milliGRAM(s) Oral daily  pantoprazole  Injectable 40 milliGRAM(s) IV Push two times a day  sacubitril 24 mG/valsartan 26 mG 1 Tablet(s) Oral two times a day  sertraline 50 milliGRAM(s) Oral daily  timolol 0.5% Solution 1 Drop(s) Both EYES two times a day

## 2020-07-04 NOTE — DIETITIAN INITIAL EVALUATION ADULT. - DIET TYPE
DASH/TLC (sodium and cholesterol restricted diet)/fiber/residue restricted/soft/gastroesophageal reflux disease DASH/TLC (sodium and cholesterol restricted diet)/fiber/residue restricted/soft

## 2020-07-04 NOTE — PROGRESS NOTE ADULT - ASSESSMENT
85y/o F with HFrEF, DVT/PE on eliquis, HTN, CKD, hx of anemia presents to ED with possible melena and bloody stools.     #LGIB due to polyps, diverticulosis   **** NO BLOOD TRANSFUSIONS AS PER PT WISHES, JEHOVA'S WITNESS ****  -Hb 9.4 this morning, stable  -colonoscopy showed 3 polyps, removed, diverticulosis  - hold PO iron & eliquis (no NSAIDs)- last eliquis dose 6/30, restart tonight if hb stable  - protonix IV BID  - Advance diet as tolerated    #CKD4  - Cr @ baseline at 2.2, monitor and avoid nephrotoxins    #Hx of PE/DVT  - hold eliquis for now    #HFrEF- c/w lasix TThSaSu, toprol 50, entrestor 24-26, asa 81  #HDL- c/w atorvastatin 40  #Glaucoma- c/w eye drops  #Likely folic acid def- c/w supplement    #MISC  - DVT ppx: SCD  - GI ppx: PPI BID  - Diet: Clears for now, advance as tolerated  - Activity: as tolerated  - Dispo: acute, f/u am hb, eliquis tonight

## 2020-07-05 ENCOUNTER — TRANSCRIPTION ENCOUNTER (OUTPATIENT)
Age: 85
End: 2020-07-05

## 2020-07-05 VITALS
SYSTOLIC BLOOD PRESSURE: 164 MMHG | RESPIRATION RATE: 18 BRPM | TEMPERATURE: 96 F | HEART RATE: 63 BPM | DIASTOLIC BLOOD PRESSURE: 76 MMHG

## 2020-07-05 LAB
ALBUMIN SERPL ELPH-MCNC: 3.5 G/DL — SIGNIFICANT CHANGE UP (ref 3.5–5.2)
ALP SERPL-CCNC: 124 U/L — HIGH (ref 30–115)
ALT FLD-CCNC: 13 U/L — SIGNIFICANT CHANGE UP (ref 0–41)
ANION GAP SERPL CALC-SCNC: 13 MMOL/L — SIGNIFICANT CHANGE UP (ref 7–14)
AST SERPL-CCNC: 28 U/L — SIGNIFICANT CHANGE UP (ref 0–41)
BASOPHILS # BLD AUTO: 0.04 K/UL — SIGNIFICANT CHANGE UP (ref 0–0.2)
BASOPHILS NFR BLD AUTO: 0.5 % — SIGNIFICANT CHANGE UP (ref 0–1)
BILIRUB SERPL-MCNC: 0.3 MG/DL — SIGNIFICANT CHANGE UP (ref 0.2–1.2)
BUN SERPL-MCNC: 25 MG/DL — HIGH (ref 10–20)
CALCIUM SERPL-MCNC: 8.3 MG/DL — LOW (ref 8.5–10.1)
CHLORIDE SERPL-SCNC: 101 MMOL/L — SIGNIFICANT CHANGE UP (ref 98–110)
CO2 SERPL-SCNC: 20 MMOL/L — SIGNIFICANT CHANGE UP (ref 17–32)
CREAT SERPL-MCNC: 2.3 MG/DL — HIGH (ref 0.7–1.5)
EOSINOPHIL # BLD AUTO: 0.37 K/UL — SIGNIFICANT CHANGE UP (ref 0–0.7)
EOSINOPHIL NFR BLD AUTO: 4.9 % — SIGNIFICANT CHANGE UP (ref 0–8)
GLUCOSE SERPL-MCNC: 89 MG/DL — SIGNIFICANT CHANGE UP (ref 70–99)
HCT VFR BLD CALC: 34.5 % — LOW (ref 37–47)
HGB BLD-MCNC: 10.3 G/DL — LOW (ref 12–16)
IMM GRANULOCYTES NFR BLD AUTO: 0.8 % — HIGH (ref 0.1–0.3)
LYMPHOCYTES # BLD AUTO: 2.69 K/UL — SIGNIFICANT CHANGE UP (ref 1.2–3.4)
LYMPHOCYTES # BLD AUTO: 35.3 % — SIGNIFICANT CHANGE UP (ref 20.5–51.1)
MAGNESIUM SERPL-MCNC: 1.7 MG/DL — LOW (ref 1.8–2.4)
MCHC RBC-ENTMCNC: 27.9 PG — SIGNIFICANT CHANGE UP (ref 27–31)
MCHC RBC-ENTMCNC: 29.9 G/DL — LOW (ref 32–37)
MCV RBC AUTO: 93.5 FL — SIGNIFICANT CHANGE UP (ref 81–99)
MONOCYTES # BLD AUTO: 0.83 K/UL — HIGH (ref 0.1–0.6)
MONOCYTES NFR BLD AUTO: 10.9 % — HIGH (ref 1.7–9.3)
NEUTROPHILS # BLD AUTO: 3.62 K/UL — SIGNIFICANT CHANGE UP (ref 1.4–6.5)
NEUTROPHILS NFR BLD AUTO: 47.6 % — SIGNIFICANT CHANGE UP (ref 42.2–75.2)
NRBC # BLD: 0 /100 WBCS — SIGNIFICANT CHANGE UP (ref 0–0)
PLATELET # BLD AUTO: 331 K/UL — SIGNIFICANT CHANGE UP (ref 130–400)
POTASSIUM SERPL-MCNC: 4.4 MMOL/L — SIGNIFICANT CHANGE UP (ref 3.5–5)
POTASSIUM SERPL-SCNC: 4.4 MMOL/L — SIGNIFICANT CHANGE UP (ref 3.5–5)
PROT SERPL-MCNC: 6.4 G/DL — SIGNIFICANT CHANGE UP (ref 6–8)
RBC # BLD: 3.69 M/UL — LOW (ref 4.2–5.4)
RBC # FLD: 14.8 % — HIGH (ref 11.5–14.5)
SODIUM SERPL-SCNC: 134 MMOL/L — LOW (ref 135–146)
WBC # BLD: 7.61 K/UL — SIGNIFICANT CHANGE UP (ref 4.8–10.8)
WBC # FLD AUTO: 7.61 K/UL — SIGNIFICANT CHANGE UP (ref 4.8–10.8)

## 2020-07-05 PROCEDURE — 99239 HOSP IP/OBS DSCHRG MGMT >30: CPT

## 2020-07-05 RX ORDER — MAGNESIUM SULFATE 500 MG/ML
1 VIAL (ML) INJECTION ONCE
Refills: 0 | Status: COMPLETED | OUTPATIENT
Start: 2020-07-05 | End: 2020-07-05

## 2020-07-05 RX ORDER — PANTOPRAZOLE SODIUM 20 MG/1
40 TABLET, DELAYED RELEASE ORAL
Refills: 0 | Status: DISCONTINUED | OUTPATIENT
Start: 2020-07-05 | End: 2020-07-05

## 2020-07-05 RX ADMIN — Medication 667 MILLIGRAM(S): at 11:25

## 2020-07-05 RX ADMIN — Medication 50 MILLIGRAM(S): at 05:10

## 2020-07-05 RX ADMIN — Medication 1 MILLIGRAM(S): at 11:25

## 2020-07-05 RX ADMIN — Medication 20 MILLIGRAM(S): at 11:25

## 2020-07-05 RX ADMIN — Medication 100 GRAM(S): at 11:25

## 2020-07-05 RX ADMIN — SERTRALINE 50 MILLIGRAM(S): 25 TABLET, FILM COATED ORAL at 11:25

## 2020-07-05 RX ADMIN — CHLORHEXIDINE GLUCONATE 1 APPLICATION(S): 213 SOLUTION TOPICAL at 05:10

## 2020-07-05 RX ADMIN — APIXABAN 2.5 MILLIGRAM(S): 2.5 TABLET, FILM COATED ORAL at 05:10

## 2020-07-05 RX ADMIN — Medication 1 DROP(S): at 05:11

## 2020-07-05 RX ADMIN — SACUBITRIL AND VALSARTAN 1 TABLET(S): 24; 26 TABLET, FILM COATED ORAL at 05:10

## 2020-07-05 RX ADMIN — Medication 81 MILLIGRAM(S): at 11:25

## 2020-07-05 RX ADMIN — PANTOPRAZOLE SODIUM 40 MILLIGRAM(S): 20 TABLET, DELAYED RELEASE ORAL at 05:11

## 2020-07-05 NOTE — DISCHARGE NOTE PROVIDER - NSDCFUSCHEDAPPT_GEN_ALL_CORE_FT
RACHEL SUMMERS ; 07/17/2020 ; [facility] [address] RACHEL SUMMERS ; 07/17/2020 ; [facility] [address]

## 2020-07-05 NOTE — DISCHARGE NOTE NURSING/CASE MANAGEMENT/SOCIAL WORK - PATIENT PORTAL LINK FT
You can access the FollowMyHealth Patient Portal offered by [facility] by registering at the following website: [email or web address]. By joining "[employer]’s FollowMyHealth portal, you will also be able to view your health information using other applications (apps) compatible with our system.

## 2020-07-05 NOTE — PROGRESS NOTE ADULT - SUBJECTIVE AND OBJECTIVE BOX
Progress Note:  Provider Speciality                            Hospitalist      RACHEL SUMMERS [id number] 84y Female     CHIEF PRESENTING COMPLAINT:  Patient is a 84y old  Female who presents with a chief complaint of r/o GI Bleed (02 Jul 2020 10:08)        SUBJECTIVE:  Patient was seen and examined at bedside. no complaint of melena today    HISTORY OF PRESENTING ILLNESS:  HPI:  85y/o F with PMH of PE/DVT on Eliquis, DLD, HFpEF (53%) s/p PPM, gout, HTN, CKD3-4 presents to ED after having black and bloody stools for 5 days. Patient noted black stools starting on June 25th, and since 6/29 had x2 episode of bright red blood streaked BMs. Last BM was 6/30. Has had associated lower abd pain, radiating to L flank for 2wks - but mostly associated with full bladder and relieved after urinates. Pain is mild and not a/w food intake. Does endorse dysuria. Today had sharper L flank pain in AM as well. Recently pt started on iron supplements 6/23 but stopped 3d ago. She admits to some inc weakness and mild dizziness since this started. No other CP, SOB, fever/chills, nausea/vomiting/diarrhea, back pain, headaches. Denies NSAID use. States had colonoscopy in [place] in January that showed diverticulosis but nothing else she can remember. Never had EGD.    *** PT IS A JEHOVA'S WITNESS - REFUSES ANY BLOOD PRODUCTS EVEN IF RISK OF DEATH ***    In ED: Tmax 99.5, HR 64, /65, RR18, SpO2 98%. DAPHNE showed brown stools  Labs: Hbg 9.3 (@ baseline), INR 1.43, Cr 2.2 (@ baseline). UA neg for infection or blood.   CT abd/pelvis neg for stones or signs of infection.   Admit for Hbg monitoring. (30 Jun 2020 18:02)        REVIEW OF SYSTEMS:  Patient denies any headache, any vision complaints, runny nose, fever, chills, sore throat. Denies chest pain, shortness of breath, palpitation. Denies nausea, vomiting, abdominal pain, diarrhoea, Denies urinary burning, urgency, frequency, dysuria. Denies weakness in any part of the body or numbness.   At least 10 systems were reviewed in ROS. All systems reviewed  are within normal limits except for the complaints as described in Subjective.    PAST MEDICAL & SURGICAL HISTORY:  PAST MEDICAL & SURGICAL HISTORY:  DVT, lower extremity  Rheumatoid arthritis  Diastolic heart failure  Diverticulitis: sigmoid  Gout  Hypertension  Pacemaker  Chronic kidney disease  History of hysterectomy  History of tonsillectomy  History of appendectomy  Artificial pacemaker          VITAL SIGNS:  Vital Signs Last 24 Hrs  T(C): 35.6 (05 Jul 2020 07:50), Max: 36 (04 Jul 2020 15:43)  T(F): 96.1 (05 Jul 2020 07:50), Max: 96.8 (04 Jul 2020 15:43)  HR: 63 (05 Jul 2020 07:50) (60 - 63)  BP: 164/76 (05 Jul 2020 07:50) (113/54 - 164/76)  BP(mean): --  RR: 18 (05 Jul 2020 07:50) (17 - 18)  SpO2: --      PHYSICAL EXAMINATION:  Not in acute distress  General: No pallor, no icterus  HEENT:   EOMI, no JVD.  Heart: S1+S2 audible  Lungs: bilateral  fair air entry, no wheezing, no crepitations.  Abdomen: Soft, non-tender, non-distended , no  rigidity or guarding.  CNS: Awake alert, CN  grossly intact.  Extremities:  No edema            CONSULTS:  Consultant(s) Notes Reviewed by me.   Care Discussed with Consultants/Other Providers where required.        MEDICATIONS:  MEDICATIONS  (STANDING):  aspirin  chewable 81 milliGRAM(s) Oral daily  atorvastatin 40 milliGRAM(s) Oral at bedtime  calcium acetate 667 milliGRAM(s) Oral daily  chlorhexidine 4% Liquid 1 Application(s) Topical <User Schedule>  folic acid 1 milliGRAM(s) Oral daily  furosemide    Tablet 20 milliGRAM(s) Oral <User Schedule>  latanoprost 0.005% Ophthalmic Solution 1 Drop(s) Both EYES at bedtime  metoprolol succinate ER 50 milliGRAM(s) Oral daily  pantoprazole  Injectable 40 milliGRAM(s) IV Push two times a day  sacubitril 24 mG/valsartan 26 mG 1 Tablet(s) Oral two times a day  sertraline 50 milliGRAM(s) Oral daily  timolol 0.5% Solution 1 Drop(s) Both EYES two times a day    MEDICATIONS  (PRN):            ASSESSMENT:    ASSESSMENT & PLAN:   Melena secondary to gastrointestinal bleeding -resolved  EGD- non-erosive gastritis.  Colonoscopy- 3 polys removed. Multiple polyps  Reports 3 episodes of melena/hematochezia after scope. Now reolved today. Hemoglobin/hematocrit  improved & stable  Patient  is   JEHOVA'S WITNESS  Monitor H/H Q 24  Restarted eliquis  Monitor BP and HR closely.  GI Prophylaxis with Protonix 40mg po  Chronic renal disease  stage 4 -at baseline  continue with ASA 81mg, Lipitor 40mg QHS, Entresto 24/26mg Q12, Lasix 20 mg Q24, Toprol 50mg XL  Discharge Disposition: stable for discharge when HHA set-up Progress Note:  Provider Speciality                            Hospitalist      RACHEL SUMMERS [id number] 84y Female     CHIEF PRESENTING COMPLAINT:  Patient is a 84y old  Female who presents with a chief complaint of r/o GI Bleed (02 Jul 2020 10:08)        SUBJECTIVE:  Patient was seen and examined at bedside. no complaint of melena today    HISTORY OF PRESENTING ILLNESS:  HPI:  83y/o F with PMH of PE/DVT on Eliquis, DLD, HFpEF (53%) s/p PPM, gout, HTN, CKD3-4 presents to ED after having black and bloody stools for 5 days. Patient noted black stools starting on June 25th, and since 6/29 had x2 episode of bright red blood streaked BMs. Last BM was 6/30. Has had associated lower abd pain, radiating to L flank for 2wks - but mostly associated with full bladder and relieved after urinates. Pain is mild and not a/w food intake. Does endorse dysuria. Today had sharper L flank pain in AM as well. Recently pt started on iron supplements 6/23 but stopped 3d ago. She admits to some inc weakness and mild dizziness since this started. No other CP, SOB, fever/chills, nausea/vomiting/diarrhea, back pain, headaches. Denies NSAID use. States had colonoscopy in [place] in January that showed diverticulosis but nothing else she can remember. Never had EGD.    *** PT IS A JEHOVA'S WITNESS - REFUSES ANY BLOOD PRODUCTS EVEN IF RISK OF DEATH ***    In ED: Tmax 99.5, HR 64, /65, RR18, SpO2 98%. DAPHNE showed brown stools  Labs: Hbg 9.3 (@ baseline), INR 1.43, Cr 2.2 (@ baseline). UA neg for infection or blood.   CT abd/pelvis neg for stones or signs of infection.   Admit for Hbg monitoring. (30 Jun 2020 18:02)        REVIEW OF SYSTEMS:  Patient denies any headache, any vision complaints, runny nose, fever, chills, sore throat. Denies chest pain, shortness of breath, palpitation. Denies nausea, vomiting, abdominal pain, diarrhoea, Denies urinary burning, urgency, frequency, dysuria. Denies weakness in any part of the body or numbness.   At least 10 systems were reviewed in ROS. All systems reviewed  are within normal limits except for the complaints as described in Subjective.    PAST MEDICAL & SURGICAL HISTORY:  PAST MEDICAL & SURGICAL HISTORY:  DVT, lower extremity  Rheumatoid arthritis  Diastolic heart failure  Diverticulitis: sigmoid  Gout  Hypertension  Pacemaker  Chronic kidney disease  History of hysterectomy  History of tonsillectomy  History of appendectomy  Artificial pacemaker          VITAL SIGNS:  Vital Signs Last 24 Hrs  T(C): 35.6 (05 Jul 2020 07:50), Max: 36 (04 Jul 2020 15:43)  T(F): 96.1 (05 Jul 2020 07:50), Max: 96.8 (04 Jul 2020 15:43)  HR: 63 (05 Jul 2020 07:50) (60 - 63)  BP: 164/76 (05 Jul 2020 07:50) (113/54 - 164/76)  BP(mean): --  RR: 18 (05 Jul 2020 07:50) (17 - 18)  SpO2: --      PHYSICAL EXAMINATION:  Not in acute distress  General: No pallor, no icterus  HEENT:   EOMI, no JVD.  Heart: S1+S2 audible  Lungs: bilateral  fair air entry, no wheezing, no crepitations.  Abdomen: Soft, non-tender, non-distended , no  rigidity or guarding.  CNS: Awake alert, CN  grossly intact.  Extremities:  No edema            CONSULTS:  Consultant(s) Notes Reviewed by me.   Care Discussed with Consultants/Other Providers where required.        MEDICATIONS:  MEDICATIONS  (STANDING):  aspirin  chewable 81 milliGRAM(s) Oral daily  atorvastatin 40 milliGRAM(s) Oral at bedtime  calcium acetate 667 milliGRAM(s) Oral daily  chlorhexidine 4% Liquid 1 Application(s) Topical <User Schedule>  folic acid 1 milliGRAM(s) Oral daily  furosemide    Tablet 20 milliGRAM(s) Oral <User Schedule>  latanoprost 0.005% Ophthalmic Solution 1 Drop(s) Both EYES at bedtime  metoprolol succinate ER 50 milliGRAM(s) Oral daily  pantoprazole  Injectable 40 milliGRAM(s) IV Push two times a day  sacubitril 24 mG/valsartan 26 mG 1 Tablet(s) Oral two times a day  sertraline 50 milliGRAM(s) Oral daily  timolol 0.5% Solution 1 Drop(s) Both EYES two times a day    MEDICATIONS  (PRN):            ASSESSMENT:    ASSESSMENT & PLAN:   Melena secondary to gastrointestinal bleeding -resolved  EGD- non-erosive gastritis.  Colonoscopy- 3 polys removed. Multiple polyps  Reports 3 episodes of melena/hematochezia after scope. Now reolved today. Hemoglobin/hematocrit  improved & stable  Patient  is   JEHOVA'S WITNESS  Monitor H/H Q 24  Restarted eliquis  Monitor BP and HR closely.  GI Prophylaxis with Protonix 40mg po  Chronic renal disease  stage 4 -at baseline  continue with ASA 81mg, Lipitor 40mg QHS, Entresto 24/26mg Q12, Lasix 20 mg Q24, Toprol 50mg XL  Hypomagnesemia- supplemeted  Discharge Disposition: stable for discharge when HHA set-up

## 2020-07-05 NOTE — DISCHARGE NOTE PROVIDER - CARE PROVIDER_API CALL
Wade Celeste  INTERNAL MEDICINE  [address] [place] [address]  Phone: [phone]  Fax: [phone]  Follow Up Time:     Sarah Tierney  INTERNAL MEDICINE  [address] [place] [id number]  Phone: [phone]  Fax: [phone]  Follow Up Time:

## 2020-07-05 NOTE — PROGRESS NOTE ADULT - PROVIDER SPECIALTY LIST ADULT
Internal Medicine
Hospitalist
Internal Medicine
Gastroenterology
Internal Medicine

## 2020-07-05 NOTE — PROGRESS NOTE ADULT - REASON FOR ADMISSION
N/V/constipation.
r/o GI Bleed

## 2020-07-05 NOTE — DISCHARGE NOTE PROVIDER - HOSPITAL COURSE
83 y/o F with HFrEF, DVT/PE on eliquis, HTN, CKD, hx of anemia presents to ED with possible melena and bloody stools.     Melena is now  resolved. Patient is s/p colonoscopy 7/3  which showed Diverticulosis of the whole colon.  Polyp (4 mm) in the cecum. (Polypectomy).  Polyps     (5 mm) in the descending colon. (Polypectomy).  Polyp (7 mm) in the colon. (Polypectomy).  Internal hemorrhoids.  Otherwise normal colon.     patient is s/p EGD 7/2 showing non erosive gastritis .Patient complained of small amount of fresh blood on stool 3 times since yesterday which is possibly post-polypectomy and has now stropped. Hb now improved and  stable. Eliquis restarted. Tolerating diet well. Medically stable for discharge.     Attending Attestation:    Patient was seen & examined independently. At least 10 systems were reviewed in ROS. All systems reviewed  are within normal limits. Latest vital signs and labs were reviewed today. Case was discussed with house staff in morning rounds for assessment and plan.  Patient is medically stable for discharge . About 32 mins spent on discharge disposition. 83 y/o F with HFrEF, DVT/PE on eliquis, HTN, CKD, hx of anemia presents to ED with possible melena and bloody stools. Melena is now  resolved. Patient is s/p colonoscopy 7/3  which showed Diverticulosis of the whole colon.  Polyp (4 mm) in the cecum. (Polypectomy).  Polyps (5 mm) in the descending colon. (Polypectomy).  Polyp (7 mm) in the colon. (Polypectomy).  Internal hemorrhoids.  Otherwise normal colon. patient is s/p EGD 7/2 showing non erosive gastritis .Patient complained of small amount of fresh blood on stool 3 times since yesterday which is possibly post-polypectomy and has now stropped. Hb now improved and  stable. Eliquis restarted. Tolerating diet well. Medically stable for discharge.     Attending Attestation:    Patient was seen & examined independently. At least 10 systems were reviewed in ROS. All systems reviewed  are within normal limits. Latest vital signs and labs were reviewed today. Case was discussed with house staff in morning rounds for assessment and plan.  Patient is medically stable for discharge . About 32 mins spent on discharge disposition.

## 2020-07-05 NOTE — DISCHARGE NOTE PROVIDER - NSDCCPCAREPLAN_GEN_ALL_CORE_FT
PRINCIPAL DISCHARGE DIAGNOSIS  Diagnosis: Bloody stools  Assessment and Plan of Treatment: Diverticulosis. Melena resolved. Patient is s/p colonoscopy 7/3  which showed Diverticulosis of the whole colon.  Polyp (4 mm) in the cecum. (Polypectomy).  Polyps (5 mm) in the descending colon. (Polypectomy).  Polyp (7 mm) in the colon. (Polypectomy).  Internal hemorrhoids.  Otherwise normal colon.   patient is s/p EGD 7/2 showing non erosive gastritis   Follow up with GI as outpatient.

## 2020-07-05 NOTE — DISCHARGE NOTE PROVIDER - NSDCMRMEDTOKEN_GEN_ALL_CORE_FT
apixaban 2.5 mg oral tablet: 1 tab(s) orally 2 times a day  aspirin 81 mg oral tablet, chewable: 1 tab(s) orally once a day  atorvastatin 40 mg oral tablet: 1 tab(s) orally once a day  calcium acetate 667 mg oral tablet: 1 tab(s) orally once a day  Entresto 24 mg-26 mg oral tablet: 1 tab(s) orally 2 times a day  ferrous sulfate 325 mg (65 mg elemental iron) oral tablet: 1 tab(s) orally once a day   folic acid 1 mg oral tablet: 1 tab(s) orally once a day  furosemide 20 mg oral tablet: 1 tab(s) orally Tuesday, Thursday, Saturday, Sunday  latanoprost 0.005% ophthalmic solution: 1 drop(s) to each affected eye once a day (in the evening)  Metoprolol Succinate ER 50 mg oral tablet, extended release: 1 tab(s) orally once a day  pantoprazole 40 mg oral delayed release tablet: 1 tab(s) orally once a day (before a meal)  sertraline 50 mg oral tablet: 1 tab(s) orally once a day  Systane Ultra ophthalmic solution: 1 drop(s) to each affected eye once a day  timolol maleate 0.5% ophthalmic solution: 1 drop(s) to each affected eye 2 times a day

## 2020-07-06 LAB — SURGICAL PATHOLOGY STUDY: SIGNIFICANT CHANGE UP

## 2020-07-07 LAB — SURGICAL PATHOLOGY STUDY: SIGNIFICANT CHANGE UP

## 2020-07-08 DIAGNOSIS — Z90.710 ACQUIRED ABSENCE OF BOTH CERVIX AND UTERUS: ICD-10-CM

## 2020-07-08 DIAGNOSIS — Z79.01 LONG TERM (CURRENT) USE OF ANTICOAGULANTS: ICD-10-CM

## 2020-07-08 DIAGNOSIS — Z86.718 PERSONAL HISTORY OF OTHER VENOUS THROMBOSIS AND EMBOLISM: ICD-10-CM

## 2020-07-08 DIAGNOSIS — E53.8 DEFICIENCY OF OTHER SPECIFIED B GROUP VITAMINS: ICD-10-CM

## 2020-07-08 DIAGNOSIS — K92.2 GASTROINTESTINAL HEMORRHAGE, UNSPECIFIED: ICD-10-CM

## 2020-07-08 DIAGNOSIS — D12.6 BENIGN NEOPLASM OF COLON, UNSPECIFIED: ICD-10-CM

## 2020-07-08 DIAGNOSIS — H40.9 UNSPECIFIED GLAUCOMA: ICD-10-CM

## 2020-07-08 DIAGNOSIS — Z88.8 ALLERGY STATUS TO OTHER DRUGS, MEDICAMENTS AND BIOLOGICAL SUBSTANCES STATUS: ICD-10-CM

## 2020-07-08 DIAGNOSIS — N18.4 CHRONIC KIDNEY DISEASE, STAGE 4 (SEVERE): ICD-10-CM

## 2020-07-08 DIAGNOSIS — K64.8 OTHER HEMORRHOIDS: ICD-10-CM

## 2020-07-08 DIAGNOSIS — K57.31 DIVERTICULOSIS OF LARGE INTESTINE WITHOUT PERFORATION OR ABSCESS WITH BLEEDING: ICD-10-CM

## 2020-07-08 DIAGNOSIS — D12.0 BENIGN NEOPLASM OF CECUM: ICD-10-CM

## 2020-07-08 DIAGNOSIS — Z95.0 PRESENCE OF CARDIAC PACEMAKER: ICD-10-CM

## 2020-07-08 DIAGNOSIS — I42.8 OTHER CARDIOMYOPATHIES: ICD-10-CM

## 2020-07-08 DIAGNOSIS — I13.0 HYPERTENSIVE HEART AND CHRONIC KIDNEY DISEASE WITH HEART FAILURE AND STAGE 1 THROUGH STAGE 4 CHRONIC KIDNEY DISEASE, OR UNSPECIFIED CHRONIC KIDNEY DISEASE: ICD-10-CM

## 2020-07-08 DIAGNOSIS — D12.4 BENIGN NEOPLASM OF DESCENDING COLON: ICD-10-CM

## 2020-07-08 DIAGNOSIS — M10.9 GOUT, UNSPECIFIED: ICD-10-CM

## 2020-07-08 DIAGNOSIS — K29.60 OTHER GASTRITIS WITHOUT BLEEDING: ICD-10-CM

## 2020-07-08 DIAGNOSIS — M06.9 RHEUMATOID ARTHRITIS, UNSPECIFIED: ICD-10-CM

## 2020-07-08 DIAGNOSIS — E83.42 HYPOMAGNESEMIA: ICD-10-CM

## 2020-07-08 DIAGNOSIS — E78.5 HYPERLIPIDEMIA, UNSPECIFIED: ICD-10-CM

## 2020-07-08 DIAGNOSIS — I50.32 CHRONIC DIASTOLIC (CONGESTIVE) HEART FAILURE: ICD-10-CM

## 2020-07-08 DIAGNOSIS — K44.9 DIAPHRAGMATIC HERNIA WITHOUT OBSTRUCTION OR GANGRENE: ICD-10-CM

## 2020-07-16 ENCOUNTER — APPOINTMENT (OUTPATIENT)
Dept: GASTROENTEROLOGY | Facility: CLINIC | Age: 85
End: 2020-07-16
Payer: MEDICARE

## 2020-07-16 DIAGNOSIS — Z86.39 PERSONAL HISTORY OF OTHER ENDOCRINE, NUTRITIONAL AND METABOLIC DISEASE: ICD-10-CM

## 2020-07-16 DIAGNOSIS — Z86.79 PERSONAL HISTORY OF OTHER DISEASES OF THE CIRCULATORY SYSTEM: ICD-10-CM

## 2020-07-16 DIAGNOSIS — Z86.69 PERSONAL HISTORY OF OTHER DISEASES OF THE NERVOUS SYSTEM AND SENSE ORGANS: ICD-10-CM

## 2020-07-16 PROCEDURE — 99204 OFFICE O/P NEW MOD 45 MIN: CPT | Mod: 95

## 2020-07-16 PROCEDURE — 99214 OFFICE O/P EST MOD 30 MIN: CPT | Mod: 95

## 2020-07-16 NOTE — PHYSICAL EXAM
[General Appearance - Alert] : alert [Hearing Threshold Finger Rub Not [place]] : hearing was normal [] : no respiratory distress [Oriented To Time, Place, And Person] : oriented to person, place, and time

## 2020-07-16 NOTE — HISTORY OF PRESENT ILLNESS
[Home] : at home, [unfilled] , at the time of the visit. [Medical Office: ([facility])___] : at the medical office located in  [Verbal consent obtained from patient] : the patient, [unfilled] [FreeTextEntry4] : Sandee Moraes [de-identified] : 84 year old female patient non smoker, average risk for CRC, diverticulosis, colon polyps (3 TAs), CHF s/p AICD on eliquis, HTN, stage 3 CKD comes in to follow up after hospitalization. \par Pt was admitted with acute blood loss anemia and was found to have no site of GI bleeding after an EGD and colonoscopy, now here to schedule a capsule endoscopy to complete work up.\par Denies any gross GI bleeding, weight loss, abdominal pain or UGI sx.

## 2020-07-16 NOTE — ASSESSMENT
[FreeTextEntry1] : 84 year old female patient non smoker, average risk for CRC, diverticulosis, colon polyps (3 TAs), CHF s/p AICD on eliquis, HTN, stage 3 CKD comes in to follow up after hospitalization. \par Pt was admitted with acute blood loss anemia and was found to have no site of GI bleeding after an EGD and colonoscopy, now here to schedule a capsule endoscopy to complete work up.\par Denies any gross GI bleeding, weight loss, abdominal pain or UGI sx. \par \par PMD: Dr Celeste, following \par needs capsule endoscopy\par Risks and benefits discussed with patient.\par

## 2020-07-21 ENCOUNTER — APPOINTMENT (OUTPATIENT)
Dept: CARDIOLOGY | Facility: CLINIC | Age: 85
End: 2020-07-21

## 2020-08-07 ENCOUNTER — LABORATORY RESULT (OUTPATIENT)
Age: 85
End: 2020-08-07

## 2020-08-07 ENCOUNTER — OUTPATIENT (OUTPATIENT)
Dept: OUTPATIENT SERVICES | Facility: HOSPITAL | Age: 85
LOS: 1 days | Discharge: HOME | End: 2020-08-07

## 2020-08-07 DIAGNOSIS — Z90.89 ACQUIRED ABSENCE OF OTHER ORGANS: Chronic | ICD-10-CM

## 2020-08-07 DIAGNOSIS — Z11.59 ENCOUNTER FOR SCREENING FOR OTHER VIRAL DISEASES: ICD-10-CM

## 2020-08-07 DIAGNOSIS — Z90.710 ACQUIRED ABSENCE OF BOTH CERVIX AND UTERUS: Chronic | ICD-10-CM

## 2020-08-07 DIAGNOSIS — Z90.49 ACQUIRED ABSENCE OF OTHER SPECIFIED PARTS OF DIGESTIVE TRACT: Chronic | ICD-10-CM

## 2020-08-07 DIAGNOSIS — Z95.0 PRESENCE OF CARDIAC PACEMAKER: Chronic | ICD-10-CM

## 2020-08-10 ENCOUNTER — OUTPATIENT (OUTPATIENT)
Dept: OUTPATIENT SERVICES | Facility: HOSPITAL | Age: 85
LOS: 1 days | Discharge: HOME | End: 2020-08-10
Payer: MEDICARE

## 2020-08-10 VITALS
SYSTOLIC BLOOD PRESSURE: 186 MMHG | TEMPERATURE: 97 F | HEIGHT: 63 IN | WEIGHT: 143.96 LBS | HEART RATE: 82 BPM | DIASTOLIC BLOOD PRESSURE: 104 MMHG | RESPIRATION RATE: 16 BRPM

## 2020-08-10 DIAGNOSIS — Z90.49 ACQUIRED ABSENCE OF OTHER SPECIFIED PARTS OF DIGESTIVE TRACT: Chronic | ICD-10-CM

## 2020-08-10 DIAGNOSIS — Z95.0 PRESENCE OF CARDIAC PACEMAKER: Chronic | ICD-10-CM

## 2020-08-10 DIAGNOSIS — Z90.710 ACQUIRED ABSENCE OF BOTH CERVIX AND UTERUS: Chronic | ICD-10-CM

## 2020-08-10 DIAGNOSIS — Z90.89 ACQUIRED ABSENCE OF OTHER ORGANS: Chronic | ICD-10-CM

## 2020-08-10 PROCEDURE — 91110 GI TRC IMG INTRAL ESOPH-ILE: CPT | Mod: 26

## 2020-08-10 NOTE — ASU PREOP CHECKLIST - HEART RATE (BEATS/MIN)
Diffuse signal abnormalities involving the right cerebral hemisphere likely combination of subacute and chronic infarcts with extensive hemosiderin deposition from microscopic blood products not seen on CT. Small chronic infarct left frontal lobe.   Chronic lacunes in the cerebeller hemisphere  Diffuse Atrophy nad chronic microvascular changes  MRA Brain: No gross abnormality  MRA neck: Sub-optimal study due to motion, No obvious abnormality 82

## 2020-08-10 NOTE — H&P PST ADULT - HISTORY OF PRESENT ILLNESS
84 year old female patient non smoker, average risk for CRC, diverticulosis, colon polyps (3 TAs), CHF s/p AICD on eliquis, HTN, stage 3 CKD comes in for capsule endoscopy for evaluation of  acute anemia

## 2020-08-10 NOTE — ASU PREOP CHECKLIST - NS PREOP CHK AICD CARD
Patient is in PHP consultation room during process group.  Writer asks patient why he is not in the group room, and patient states that he is having high anxiety, is irritable, and doesn't feel he is able to process today.  This writer reinforced PHP attendance policy and the expectation of staying the full day in group, discussed consequence of discharge if patient repeatedly leaves early, and encouraged patient to return to group room and share his thoughts in process group.  Patient verbalizes understanding and then asks what his options are in regards to transportation and going home early today, and if he would be provided with bus tickets or MTM.  This writer again encouraged patient to remain in group today, informed patient that if he chooses to leave early, he is responsible for his own transportation home. Patient states he needs to go smoke a cigarette to decrease anxiety and will check in with this writer after his smoke break.  Will continue to monitor.     no

## 2020-08-10 NOTE — H&P PST ADULT - ASSESSMENT
84 year female patient with history of heart failure S/P AICD , anemia , is here for capsule endoscopy

## 2020-08-17 DIAGNOSIS — D62 ACUTE POSTHEMORRHAGIC ANEMIA: ICD-10-CM

## 2020-08-17 DIAGNOSIS — I10 ESSENTIAL (PRIMARY) HYPERTENSION: ICD-10-CM

## 2020-08-17 DIAGNOSIS — Z79.82 LONG TERM (CURRENT) USE OF ASPIRIN: ICD-10-CM

## 2020-09-12 ENCOUNTER — INPATIENT (INPATIENT)
Facility: HOSPITAL | Age: 85
LOS: 2 days | Discharge: HOME HEALTH | End: 2020-09-15
Attending: INTERNAL MEDICINE | Admitting: INTERNAL MEDICINE
Payer: MEDICARE

## 2020-09-12 VITALS
TEMPERATURE: 99 F | HEIGHT: 63 IN | HEART RATE: 65 BPM | RESPIRATION RATE: 18 BRPM | SYSTOLIC BLOOD PRESSURE: 175 MMHG | OXYGEN SATURATION: 98 % | DIASTOLIC BLOOD PRESSURE: 78 MMHG

## 2020-09-12 DIAGNOSIS — N18.3 CHRONIC KIDNEY DISEASE, STAGE 3 (MODERATE): ICD-10-CM

## 2020-09-12 DIAGNOSIS — Z90.49 ACQUIRED ABSENCE OF OTHER SPECIFIED PARTS OF DIGESTIVE TRACT: Chronic | ICD-10-CM

## 2020-09-12 DIAGNOSIS — Z95.0 PRESENCE OF CARDIAC PACEMAKER: Chronic | ICD-10-CM

## 2020-09-12 DIAGNOSIS — Z90.710 ACQUIRED ABSENCE OF BOTH CERVIX AND UTERUS: Chronic | ICD-10-CM

## 2020-09-12 DIAGNOSIS — Z90.89 ACQUIRED ABSENCE OF OTHER ORGANS: Chronic | ICD-10-CM

## 2020-09-12 LAB
ALBUMIN SERPL ELPH-MCNC: 3.9 G/DL — SIGNIFICANT CHANGE UP (ref 3.5–5.2)
ALP SERPL-CCNC: 120 U/L — HIGH (ref 30–115)
ALT FLD-CCNC: 14 U/L — SIGNIFICANT CHANGE UP (ref 0–41)
ANION GAP SERPL CALC-SCNC: 12 MMOL/L — SIGNIFICANT CHANGE UP (ref 7–14)
APTT BLD: 35 SEC — SIGNIFICANT CHANGE UP (ref 27–39.2)
AST SERPL-CCNC: 38 U/L — SIGNIFICANT CHANGE UP (ref 0–41)
BASOPHILS # BLD AUTO: 0.04 K/UL — SIGNIFICANT CHANGE UP (ref 0–0.2)
BASOPHILS NFR BLD AUTO: 0.6 % — SIGNIFICANT CHANGE UP (ref 0–1)
BILIRUB SERPL-MCNC: <0.2 MG/DL — SIGNIFICANT CHANGE UP (ref 0.2–1.2)
BUN SERPL-MCNC: 67 MG/DL — CRITICAL HIGH (ref 10–20)
CALCIUM SERPL-MCNC: 8.6 MG/DL — SIGNIFICANT CHANGE UP (ref 8.5–10.1)
CHLORIDE SERPL-SCNC: 102 MMOL/L — SIGNIFICANT CHANGE UP (ref 98–110)
CO2 SERPL-SCNC: 22 MMOL/L — SIGNIFICANT CHANGE UP (ref 17–32)
CREAT SERPL-MCNC: 2.9 MG/DL — HIGH (ref 0.7–1.5)
EOSINOPHIL # BLD AUTO: 0.65 K/UL — SIGNIFICANT CHANGE UP (ref 0–0.7)
EOSINOPHIL NFR BLD AUTO: 9.2 % — HIGH (ref 0–8)
GLUCOSE SERPL-MCNC: 94 MG/DL — SIGNIFICANT CHANGE UP (ref 70–99)
HCT VFR BLD CALC: 31.2 % — LOW (ref 37–47)
HGB BLD-MCNC: 9.3 G/DL — LOW (ref 12–16)
IMM GRANULOCYTES NFR BLD AUTO: 0.3 % — SIGNIFICANT CHANGE UP (ref 0.1–0.3)
INR BLD: 1.22 RATIO — SIGNIFICANT CHANGE UP (ref 0.65–1.3)
LYMPHOCYTES # BLD AUTO: 2 K/UL — SIGNIFICANT CHANGE UP (ref 1.2–3.4)
LYMPHOCYTES # BLD AUTO: 28.2 % — SIGNIFICANT CHANGE UP (ref 20.5–51.1)
MCHC RBC-ENTMCNC: 28.1 PG — SIGNIFICANT CHANGE UP (ref 27–31)
MCHC RBC-ENTMCNC: 29.8 G/DL — LOW (ref 32–37)
MCV RBC AUTO: 94.3 FL — SIGNIFICANT CHANGE UP (ref 81–99)
MONOCYTES # BLD AUTO: 0.94 K/UL — HIGH (ref 0.1–0.6)
MONOCYTES NFR BLD AUTO: 13.3 % — HIGH (ref 1.7–9.3)
NEUTROPHILS # BLD AUTO: 3.44 K/UL — SIGNIFICANT CHANGE UP (ref 1.4–6.5)
NEUTROPHILS NFR BLD AUTO: 48.4 % — SIGNIFICANT CHANGE UP (ref 42.2–75.2)
NRBC # BLD: 0 /100 WBCS — SIGNIFICANT CHANGE UP (ref 0–0)
NT-PROBNP SERPL-SCNC: 4382 PG/ML — HIGH (ref 0–300)
PLATELET # BLD AUTO: 310 K/UL — SIGNIFICANT CHANGE UP (ref 130–400)
POTASSIUM SERPL-MCNC: 6.2 MMOL/L — CRITICAL HIGH (ref 3.5–5)
POTASSIUM SERPL-SCNC: 6.2 MMOL/L — CRITICAL HIGH (ref 3.5–5)
PROT SERPL-MCNC: 7.3 G/DL — SIGNIFICANT CHANGE UP (ref 6–8)
PROTHROM AB SERPL-ACNC: 14 SEC — HIGH (ref 9.95–12.87)
RBC # BLD: 3.31 M/UL — LOW (ref 4.2–5.4)
RBC # FLD: 13.6 % — SIGNIFICANT CHANGE UP (ref 11.5–14.5)
SODIUM SERPL-SCNC: 136 MMOL/L — SIGNIFICANT CHANGE UP (ref 135–146)
TROPONIN T SERPL-MCNC: <0.01 NG/ML — SIGNIFICANT CHANGE UP
WBC # BLD: 7.09 K/UL — SIGNIFICANT CHANGE UP (ref 4.8–10.8)
WBC # FLD AUTO: 7.09 K/UL — SIGNIFICANT CHANGE UP (ref 4.8–10.8)

## 2020-09-12 PROCEDURE — 99285 EMERGENCY DEPT VISIT HI MDM: CPT

## 2020-09-12 PROCEDURE — 93010 ELECTROCARDIOGRAM REPORT: CPT

## 2020-09-12 NOTE — ED PROVIDER NOTE - ATTENDING CONTRIBUTION TO CARE
84 yo F with PMH of CKD, CHF, HTN, DVT on eliquis presents to ED for bleeding gums, CP and SOB. Last tuesday patient had 4 of her bottom teeth removed at the dentist. Since then she has had bleeding from them. Today patient noticed that she had SOB and CP and got concerned so she came to the ED. She also feels more weak. 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    Eyes: PERRL, no conjunctival injection  HENT:  Neck supple without meningismus. Blood clot of lower front gums. No active bleeding. No petichea.   CV:  Warm, well-perfused extremities  RESP: CTA B/L, no tachypnea   GI: soft, non-tender, non-distended  MSK: No gross deformities appreciated  Neuro: Alert, CNs II-XII grossly intact. Sensation and motor function of extremities grossly intact.  Psych: Appropriate mood and affect.    Will do blood work, EKG, labs

## 2020-09-12 NOTE — ED ADULT NURSE NOTE - OBJECTIVE STATEMENT
Pt. complains of chest pains accompanied with SOB beginning a few hours ago. Pt. states that she was also having episodes of gum bleeding since her dental work a week ago. Pt. denies any fever, chills, nausea, vomiting, diarrhea or any urinary symptoms.

## 2020-09-12 NOTE — ED PROVIDER NOTE - CLINICAL SUMMARY MEDICAL DECISION MAKING FREE TEXT BOX
85 you F presented to ED for gingival bleeding and CP. Pt HGB at baseline.  Pt admitted to medicine for further evaluation, management and medication optimization.

## 2020-09-12 NOTE — ED PROVIDER NOTE - OBJECTIVE STATEMENT
85y F pmh as listed presents for cp. Pt has 2 days of mild aching chest pain, no aggravating or relieving factors. Associated generalized body fatigue. Pt had multiple teeth extracted yesterday and has been bleeding x1day. Denies fever, ha, sob, numbness, dysuria, hematuria, n/v/d/c

## 2020-09-12 NOTE — ED PROVIDER NOTE - PHYSICAL EXAMINATION
CONST: NAD  EYES: Sclera and conjunctiva clear.   ENT: Oropharynx clot over extracted gum. No abscess or swelling. Uvula midline. No nasal discharge.  NECK: Non-tender, no meningeal signs. normal ROM. supple   CARD: S1 S2; No jvd  RESP: Equal BS B/L, No wheezes, rhonchi or rales. No distress  GI: Soft, non-tender, non-distended. no cva tenderness. normal BS  MS: Normal ROM in all extremities. pulses 2 +. no calf tenderness or swelling  SKIN: Warm, dry, no acute rashes. Good turgor  NEURO: A&Ox3, No focal deficits. Strength 5/5 with no sensory deficits.

## 2020-09-12 NOTE — ED ADULT NURSE NOTE - NSIMPLEMENTINTERV_GEN_ALL_ED
Implemented All Fall with Harm Risk Interventions:  [place] to call system. Call bell, personal items and telephone within reach. Instruct patient to call for assistance. Room bathroom lighting operational. Non-slip footwear when patient is off stretcher. Physically safe environment: no spills, clutter or unnecessary equipment. Stretcher in lowest position, wheels locked, appropriate side rails in place. Provide visual cue, wrist band, yellow gown, etc. Monitor gait and stability. Monitor for mental status changes and reorient to person, place, and time. Review medications for side effects contributing to fall risk. Reinforce activity limits and safety measures with patient and family. Provide visual clues: red socks.

## 2020-09-12 NOTE — ED PROVIDER NOTE - NS ED ROS FT
Constitutional: (+) weakness, (-) fever  Eyes/ENT: (-) blurry vision, (-) epistaxis  Cardiovascular: (+) chest pain, (-) syncope  Respiratory: (-) cough, (-) shortness of breath  Gastrointestinal: (-) vomiting, (-) diarrhea  Musculoskeletal: (-) neck pain, (-) back pain, (-) joint pain  Integumentary: (-) rash, (-) edema  Neurological: (-) headache, (-) altered mental status  Psychiatric: (-) hallucinations  Allergic/Immunologic: (-) pruritus

## 2020-09-13 ENCOUNTER — TRANSCRIPTION ENCOUNTER (OUTPATIENT)
Age: 85
End: 2020-09-13

## 2020-09-13 DIAGNOSIS — I80.229 PHLEBITIS AND THROMBOPHLEBITIS OF UNSPECIFIED POPLITEAL VEIN: ICD-10-CM

## 2020-09-13 LAB
ANION GAP SERPL CALC-SCNC: 12 MMOL/L — SIGNIFICANT CHANGE UP (ref 7–14)
BASOPHILS # BLD AUTO: 0.04 K/UL — SIGNIFICANT CHANGE UP (ref 0–0.2)
BASOPHILS NFR BLD AUTO: 0.5 % — SIGNIFICANT CHANGE UP (ref 0–1)
BUN SERPL-MCNC: 67 MG/DL — CRITICAL HIGH (ref 10–20)
CALCIUM SERPL-MCNC: 8.9 MG/DL — SIGNIFICANT CHANGE UP (ref 8.5–10.1)
CHLORIDE SERPL-SCNC: 102 MMOL/L — SIGNIFICANT CHANGE UP (ref 98–110)
CO2 SERPL-SCNC: 25 MMOL/L — SIGNIFICANT CHANGE UP (ref 17–32)
CREAT SERPL-MCNC: 2.7 MG/DL — HIGH (ref 0.7–1.5)
EOSINOPHIL # BLD AUTO: 0.57 K/UL — SIGNIFICANT CHANGE UP (ref 0–0.7)
EOSINOPHIL NFR BLD AUTO: 7.6 % — SIGNIFICANT CHANGE UP (ref 0–8)
GLUCOSE SERPL-MCNC: 100 MG/DL — HIGH (ref 70–99)
HCT VFR BLD CALC: 30.7 % — LOW (ref 37–47)
HGB BLD-MCNC: 9.3 G/DL — LOW (ref 12–16)
IMM GRANULOCYTES NFR BLD AUTO: 0.4 % — HIGH (ref 0.1–0.3)
LYMPHOCYTES # BLD AUTO: 1.91 K/UL — SIGNIFICANT CHANGE UP (ref 1.2–3.4)
LYMPHOCYTES # BLD AUTO: 25.5 % — SIGNIFICANT CHANGE UP (ref 20.5–51.1)
MCHC RBC-ENTMCNC: 28.5 PG — SIGNIFICANT CHANGE UP (ref 27–31)
MCHC RBC-ENTMCNC: 30.3 G/DL — LOW (ref 32–37)
MCV RBC AUTO: 94.2 FL — SIGNIFICANT CHANGE UP (ref 81–99)
MONOCYTES # BLD AUTO: 1.08 K/UL — HIGH (ref 0.1–0.6)
MONOCYTES NFR BLD AUTO: 14.4 % — HIGH (ref 1.7–9.3)
NEUTROPHILS # BLD AUTO: 3.85 K/UL — SIGNIFICANT CHANGE UP (ref 1.4–6.5)
NEUTROPHILS NFR BLD AUTO: 51.6 % — SIGNIFICANT CHANGE UP (ref 42.2–75.2)
NRBC # BLD: 0 /100 WBCS — SIGNIFICANT CHANGE UP (ref 0–0)
PLATELET # BLD AUTO: 315 K/UL — SIGNIFICANT CHANGE UP (ref 130–400)
POTASSIUM SERPL-MCNC: 5.1 MMOL/L — HIGH (ref 3.5–5)
POTASSIUM SERPL-SCNC: 5.1 MMOL/L — HIGH (ref 3.5–5)
RAPID RVP RESULT: SIGNIFICANT CHANGE UP
RBC # BLD: 3.26 M/UL — LOW (ref 4.2–5.4)
RBC # FLD: 13.6 % — SIGNIFICANT CHANGE UP (ref 11.5–14.5)
SARS-COV-2 RNA SPEC QL NAA+PROBE: SIGNIFICANT CHANGE UP
SODIUM SERPL-SCNC: 139 MMOL/L — SIGNIFICANT CHANGE UP (ref 135–146)
WBC # BLD: 7.48 K/UL — SIGNIFICANT CHANGE UP (ref 4.8–10.8)
WBC # FLD AUTO: 7.48 K/UL — SIGNIFICANT CHANGE UP (ref 4.8–10.8)

## 2020-09-13 PROCEDURE — 93010 ELECTROCARDIOGRAM REPORT: CPT

## 2020-09-13 PROCEDURE — 99223 1ST HOSP IP/OBS HIGH 75: CPT | Mod: GC

## 2020-09-13 PROCEDURE — 71045 X-RAY EXAM CHEST 1 VIEW: CPT | Mod: 26

## 2020-09-13 PROCEDURE — 99222 1ST HOSP IP/OBS MODERATE 55: CPT

## 2020-09-13 RX ORDER — FERROUS SULFATE 325(65) MG
325 TABLET ORAL DAILY
Refills: 0 | Status: DISCONTINUED | OUTPATIENT
Start: 2020-09-13 | End: 2020-09-15

## 2020-09-13 RX ORDER — LATANOPROST 0.05 MG/ML
1 SOLUTION/ DROPS OPHTHALMIC; TOPICAL AT BEDTIME
Refills: 0 | Status: DISCONTINUED | OUTPATIENT
Start: 2020-09-13 | End: 2020-09-15

## 2020-09-13 RX ORDER — FUROSEMIDE 40 MG
20 TABLET ORAL DAILY
Refills: 0 | Status: DISCONTINUED | OUTPATIENT
Start: 2020-09-13 | End: 2020-09-15

## 2020-09-13 RX ORDER — PANTOPRAZOLE SODIUM 20 MG/1
40 TABLET, DELAYED RELEASE ORAL
Refills: 0 | Status: DISCONTINUED | OUTPATIENT
Start: 2020-09-13 | End: 2020-09-15

## 2020-09-13 RX ORDER — METOPROLOL TARTRATE 50 MG
25 TABLET ORAL DAILY
Refills: 0 | Status: DISCONTINUED | OUTPATIENT
Start: 2020-09-13 | End: 2020-09-15

## 2020-09-13 RX ORDER — ATORVASTATIN CALCIUM 80 MG/1
40 TABLET, FILM COATED ORAL AT BEDTIME
Refills: 0 | Status: DISCONTINUED | OUTPATIENT
Start: 2020-09-13 | End: 2020-09-15

## 2020-09-13 RX ORDER — TIMOLOL 0.5 %
1 DROPS OPHTHALMIC (EYE)
Qty: 0 | Refills: 0 | DISCHARGE

## 2020-09-13 RX ORDER — SACUBITRIL AND VALSARTAN 24; 26 MG/1; MG/1
1 TABLET, FILM COATED ORAL
Refills: 0 | Status: DISCONTINUED | OUTPATIENT
Start: 2020-09-13 | End: 2020-09-15

## 2020-09-13 RX ORDER — CALCIUM ACETATE 667 MG
667 TABLET ORAL
Refills: 0 | Status: DISCONTINUED | OUTPATIENT
Start: 2020-09-13 | End: 2020-09-15

## 2020-09-13 RX ORDER — HEPARIN SODIUM 5000 [USP'U]/ML
5000 INJECTION INTRAVENOUS; SUBCUTANEOUS EVERY 12 HOURS
Refills: 0 | Status: DISCONTINUED | OUTPATIENT
Start: 2020-09-13 | End: 2020-09-14

## 2020-09-13 RX ORDER — SERTRALINE 25 MG/1
50 TABLET, FILM COATED ORAL DAILY
Refills: 0 | Status: DISCONTINUED | OUTPATIENT
Start: 2020-09-13 | End: 2020-09-15

## 2020-09-13 RX ORDER — INFLUENZA VIRUS VACCINE 15; 15; 15; 15 UG/.5ML; UG/.5ML; UG/.5ML; UG/.5ML
0.5 SUSPENSION INTRAMUSCULAR ONCE
Refills: 0 | Status: COMPLETED | OUTPATIENT
Start: 2020-09-13 | End: 2020-09-13

## 2020-09-13 RX ORDER — FOLIC ACID 0.8 MG
1 TABLET ORAL DAILY
Refills: 0 | Status: DISCONTINUED | OUTPATIENT
Start: 2020-09-13 | End: 2020-09-15

## 2020-09-13 RX ADMIN — SACUBITRIL AND VALSARTAN 1 TABLET(S): 24; 26 TABLET, FILM COATED ORAL at 18:50

## 2020-09-13 RX ADMIN — Medication 667 MILLIGRAM(S): at 17:14

## 2020-09-13 RX ADMIN — Medication 325 MILLIGRAM(S): at 12:43

## 2020-09-13 RX ADMIN — ATORVASTATIN CALCIUM 40 MILLIGRAM(S): 80 TABLET, FILM COATED ORAL at 22:24

## 2020-09-13 RX ADMIN — Medication 20 MILLIGRAM(S): at 12:43

## 2020-09-13 RX ADMIN — SERTRALINE 50 MILLIGRAM(S): 25 TABLET, FILM COATED ORAL at 12:43

## 2020-09-13 RX ADMIN — HEPARIN SODIUM 5000 UNIT(S): 5000 INJECTION INTRAVENOUS; SUBCUTANEOUS at 18:50

## 2020-09-13 RX ADMIN — Medication 667 MILLIGRAM(S): at 12:43

## 2020-09-13 RX ADMIN — LATANOPROST 1 DROP(S): 0.05 SOLUTION/ DROPS OPHTHALMIC; TOPICAL at 22:26

## 2020-09-13 RX ADMIN — Medication 1 MILLIGRAM(S): at 12:43

## 2020-09-13 NOTE — H&P ADULT - NSHPLABSRESULTS_GEN_ALL_CORE
9.3    7.09  )-----------( 310      ( 12 Sep 2020 22:25 )             31.2     09-12    136  |  102  |  67<HH>  ----------------------------<  94  6.2<HH>   |  22  |  2.9<H>    Ca    8.6      12 Sep 2020 22:25    TPro  7.3  /  Alb  3.9  /  TBili  <0.2  /  DBili  x   /  AST  38  /  ALT  14  /  AlkPhos  120<H>  09-12    PT/INR - ( 12 Sep 2020 22:25 )   PT: 14.00 sec;   INR: 1.22 ratio         PTT - ( 12 Sep 2020 22:25 )  PTT:35.0 sec  Aspartate Aminotransferase (AST/SGOT): 38 U/L (09-12-20 @ 22:25)  Alanine Aminotransferase (ALT/SGPT): 14 U/L (09-12-20 @ 22:25)    Serum Pro-Brain Natriuretic Peptide (09.12.20 @ 22:25)  Serum Pro-Brain Natriuretic Peptide: 4382 pg/mL  Serum Pro-Brain Natriuretic Peptide (06.21.20 @ 06:02)  Serum Pro-Brain Natriuretic Peptide: 89514 pg/mL    Troponin T, Serum: <0.01: Hemolyzed. Interpret with caution ng/mL (09.12.20 @ 22:25)    Xray Chest 1 View- PORTABLE-Urgent (Xray Chest 1 View- PORTABLE-Urgent .) (09.13.20 @ 00:22) >  Mild pulmonary vascular congestion

## 2020-09-13 NOTE — H&P ADULT - ASSESSMENT
*** PT IS A JEHOVA'S WITNESS - REFUSES ANY BLOOD PRODUCTS EVEN IF RISK OF DEATH ***    84 year old female patient presenting to [facility] for assessment of gingival bleed and two episodes of chest pain.    Known non-ischemic cardiomyopathy [6/21/20 - LVEF 20-25%, GIIDD, PASP 63mmHg] [6/22/20 - normal adenosine stress / rest myocardial perfusion scan], CRT-d Medtronic deployed in 2004 (Dr. Baez). Known history of chronic left popliteal DVT, history of PE, CKD III baseline 2-2.2, dyslipidemic. Recent admission to [facility] for bright red blood streaked BMs, followed as an outpatient with capsule endoscopy with Dr. Tierney.    # Chest pain in a patient with non-ischemic CM  - Atypical chest pain by history  - Cardiac biomarkers -ve  - Recent adenosine stress test normal  - Will trend troponin  - Follows with Dr. Mckinney, pending recommendations    # Gingival bleed following dental procedure  - On apixaban and plavix at home  - Holding both given risk of bleed and patient's Shinto beliefs prevent transfusion as modality of treatment.   - Continue iron supplementation    # HFrEF  - GDMT > Entresto, metoprolol, Atorvastatin, Furosemide  - Has medtronic CRT-d, last interrogation unremarkable, no events in history for arrhythmia current time.  - Continue iron supplementation    # Dyslipidemic  - Continue atorvastatin 40mg qd  - Follow morning lipid panel     # History of PE/DVT  - Holding apixaban given bleed     # Glaucoma  - Continue eye drops    # CKD III  - Creatinine at baseline    # Diet > DASH/TLC   # DVT > Heparin (currently not bleeding, known DVT/PE, won't give therapeutic anticoagulation but will cover for DVT)

## 2020-09-13 NOTE — H&P ADULT - NSHPPHYSICALEXAM_GEN_ALL_CORE
General: A/ox 3, No acute Distress  Neck: Supple, NO JVD  Cardiac: S1 S2 heard regular, No audible murmurs  Pulmonary: Good bilateral breath sounds, Breathing unlabored, No Rhonchi/Rales/Wheezing  Abdomen: Soft, Non -tender, +BS   Extremities: No Rashes, No edema  Neuro: A/o x 3, No focal deficits  Psch: normal mood , normal affect    T(C): 36.6 (09-13-20 @ 07:37), Max: 37.2 (09-12-20 @ 20:55)  HR: 81 (09-13-20 @ 07:37) (60 - 81)  BP: 158/79 (09-13-20 @ 07:37) (158/79 - 181/80)  RR: 18 (09-13-20 @ 07:37) (18 - 20)  SpO2: 98% (09-13-20 @ 07:37) (95% - 100%)

## 2020-09-13 NOTE — DISCHARGE NOTE NURSING/CASE MANAGEMENT/SOCIAL WORK - PATIENT PORTAL LINK FT
You can access the FollowMyHealth Patient Portal offered by [facility] by registering at the following website: [email or web address]. By joining [employer]’s FollowMyHealth portal, you will also be able to view your health information using other applications (apps) compatible with our system.

## 2020-09-13 NOTE — H&P ADULT - ATTENDING COMMENTS
HPI: 84/F with non-ischemic cardiomyopathy [6/21/20 - LVEF 20-25%, GIIDD, PASP 63mmHg] [6/22/20 - normal adenosine stress / rest myocardial perfusion scan], CRT-d Medtronic deployed in 2004 (Dr. Baez), chronic left popliteal DVT - unprovoked, history of PE, CKD III baseline 2-2.2, hyperlipidemia admitted with gingival bleed after dental procedure and chest pain.     ROS: negative except above.  · Constitutional	Well-developed, well nourished  · Eyes	conjuctivae clear  · ENMT	No oral lesions; no gross abnormalities  · Neck	No bruits; no thyromegaly or nodules   · Back	No deformity or limitation of movement   · Respiratory	Breath Sounds equal & clear to percussion & auscultation, no accessory muscle use  · Cardiovascular	Regular rate & rhythm, normal S1, S2; no murmurs, gallops or rubs; no S3, S4  · Gastrointestinal	Soft, non-tender, no hepatosplenomegaly, normal bowel sounds  · Extremities	No cyanosis, clubbing or edema   · Neurological	Alert & oriented; no sensory, motor or coordination deficits, normal reflexes  · Musculoskeletal	No joint pain, swelling or deformity; no limitation of movement                            9.3    7.09  )-----------( 310      ( 12 Sep 2020 22:25 )             31.2       09-12    136  |  102  |  67<HH>  ----------------------------<  94  6.2<HH>   |  22  |  2.9<H>    Ca    8.6      12 Sep 2020 22:25    TPro  7.3  /  Alb  3.9  /  TBili  <0.2  /  DBili  x   /  AST  38  /  ALT  14  /  AlkPhos  120<H>  09-12                  PT/INR - ( 12 Sep 2020 22:25 )   PT: 14.00 sec;   INR: 1.22 ratio         PTT - ( 12 Sep 2020 22:25 )  PTT:35.0 sec    CARDIAC MARKERS ( 12 Sep 2020 22:25 )  x     / <0.01 ng/mL / x     / x     / x        Chest xray: Mild pulmonary vascular congestion    Plan:   1. Gingival bleed  2. Chest pain  3. CHFrHF with history of non ischemic cardiomyopathy  4. HLD  5. history of DVT/PE  6. CKD 3  7. glaucoma     - s/p tooth extraction recently - continued to have gingival bleed   - bleeding stopped since hospital admission while antiplatelet/AC on hold   - chest pain resolved at this time   - on aspirin/plavix and eliquis -  unclear reason for DAPT   - will hold for now - monitor for bleeding   - EKG- vpaced rhythm, no ischemic changes noted   - trend cardiac enzymes   - xray mild congestion noted   - stat lasix 40 iv and then start on lasix 40 mg oral   - unclear lasix dose - family mentioned different doses to diff provider   - AICD interrogation   - continue heart failure medications   - attempted to call PCP Dr. Celeste on [phone] - obtain more records when office opens  - Jew - explained in detail about possibility of further bleed and it can be life threatening with out transfusion - she and family understands and declines any blood product.   - will minimize blood work   - discussed about possible use of epogen - patient is fine with use.  -  home medications reviewed and restarted as indicated. HPI: 84/F with non-ischemic cardiomyopathy [6/21/20 - LVEF 20-25%, GIIDD, PASP 63mmHg] [6/22/20 - normal adenosine stress / rest myocardial perfusion scan], CRT-d Medtronic deployed in 2004 (Dr. Baez), chronic left popliteal DVT - unprovoked, history of PE, CKD III baseline 2-2.2, hyperlipidemia admitted with gingival bleed after dental procedure and chest pain.     ROS: negative except above.  · Constitutional	Well-developed, well nourished  · Eyes	conjuctivae clear  · ENMT	No oral lesions; no gross abnormalities  · Neck	No bruits; no thyromegaly or nodules   · Back	No deformity or limitation of movement   · Respiratory	Breath Sounds equal & clear to percussion & auscultation, no accessory muscle use  · Cardiovascular	Regular rate & rhythm, normal S1, S2; no murmurs, gallops or rubs; no S3, S4  · Gastrointestinal	Soft, non-tender, no hepatosplenomegaly, normal bowel sounds  · Extremities	No cyanosis, clubbing or edema   · Neurological	Alert & oriented; no sensory, motor or coordination deficits, normal reflexes  · Musculoskeletal	No joint pain, swelling or deformity; no limitation of movement                            9.3    7.09  )-----------( 310      ( 12 Sep 2020 22:25 )             31.2       09-12    136  |  102  |  67<HH>  ----------------------------<  94  6.2<HH>   |  22  |  2.9<H>    Ca    8.6      12 Sep 2020 22:25    TPro  7.3  /  Alb  3.9  /  TBili  <0.2  /  DBili  x   /  AST  38  /  ALT  14  /  AlkPhos  120<H>  09-12                  PT/INR - ( 12 Sep 2020 22:25 )   PT: 14.00 sec;   INR: 1.22 ratio         PTT - ( 12 Sep 2020 22:25 )  PTT:35.0 sec    CARDIAC MARKERS ( 12 Sep 2020 22:25 )  x     / <0.01 ng/mL / x     / x     / x        Chest xray: Mild pulmonary vascular congestion    Plan:   1. Gingival bleed  2. Chest pain  3. CHFrHF with history of non ischemic cardiomyopathy  4. HLD  5. history of DVT/PE  6. CKD 3  7. glaucoma   8. anemia     - s/p tooth extraction recently - continued to have gingival bleed   - bleeding stopped since hospital admission while antiplatelet/AC on hold   - chest pain resolved at this time   - on aspirin/plavix and eliquis -  unclear reason for DAPT   - will hold for now - monitor for bleeding   - EKG- vpaced rhythm, no ischemic changes noted   - trend cardiac enzymes   - xray mild congestion noted   - stat lasix 40 iv and then start on lasix 40 mg oral   - unclear lasix dose - family mentioned different doses to diff provider   - AICD interrogation   - continue heart failure medications   - attempted to call PCP Dr. Celeste on [phone] - obtain more records when office opens  - Restorationism - explained in detail about possibility of further bleed and it can be life threatening with out transfusion - she and family understands and declines any blood product.   - will minimize blood work   - discussed about possible use of epogen - patient is fine with use.  - iron panel from 06/2020- low normal iron level - will start on supplement   -  home medications reviewed and restarted as indicated. HPI: 84/F with non-ischemic cardiomyopathy [6/21/20 - LVEF 20-25%, GIIDD, PASP 63mmHg] [6/22/20 - normal adenosine stress / rest myocardial perfusion scan], CRT-d Medtronic deployed in 2004 (Dr. Baez), chronic left popliteal DVT - unprovoked, history of PE, CKD III baseline 2-2.2, hyperlipidemia admitted with gingival bleed after dental procedure and chest pain.     ROS: negative except above.  · Constitutional	Well-developed, well nourished  · Eyes	conjuctivae clear  · ENMT	No oral lesions; no gross abnormalities  · Neck	No bruits; no thyromegaly or nodules   · Back	No deformity or limitation of movement   · Respiratory	Breath Sounds equal & clear to percussion & auscultation, no accessory muscle use  · Cardiovascular	Regular rate & rhythm, normal S1, S2; no murmurs, gallops or rubs; no S3, S4  · Gastrointestinal	Soft, non-tender, no hepatosplenomegaly, normal bowel sounds  · Extremities	No cyanosis, clubbing or edema   · Neurological	Alert & oriented; no sensory, motor or coordination deficits, normal reflexes  · Musculoskeletal	No joint pain, swelling or deformity; no limitation of movement                            9.3    7.09  )-----------( 310      ( 12 Sep 2020 22:25 )             31.2       09-12    136  |  102  |  67<HH>  ----------------------------<  94  6.2<HH>   |  22  |  2.9<H>    Ca    8.6      12 Sep 2020 22:25    TPro  7.3  /  Alb  3.9  /  TBili  <0.2  /  DBili  x   /  AST  38  /  ALT  14  /  AlkPhos  120<H>  09-12                  PT/INR - ( 12 Sep 2020 22:25 )   PT: 14.00 sec;   INR: 1.22 ratio         PTT - ( 12 Sep 2020 22:25 )  PTT:35.0 sec    CARDIAC MARKERS ( 12 Sep 2020 22:25 )  x     / <0.01 ng/mL / x     / x     / x        Chest xray: Mild pulmonary vascular congestion    Plan:   1. Gingival bleed  2. Chest pain  3. CHFrHF with history of non ischemic cardiomyopathy  4. HLD  5. history of DVT/PE  6. CKD 4  7. glaucoma   8. anemia     - s/p tooth extraction recently - continued to have gingival bleed   - bleeding stopped since hospital admission while antiplatelet/AC on hold   - chest pain resolved at this time   - on aspirin/plavix and eliquis -  unclear reason for DAPT   - will hold for now - monitor for bleeding   - EKG- vpaced rhythm, no ischemic changes noted   - trend cardiac enzymes   - xray mild congestion noted   - stat lasix 40 iv and then start on lasix 40 mg oral   - unclear lasix dose - family mentioned different doses to diff provider   - AICD interrogation   - continue heart failure medications   - attempted to call PCP Dr. Celeste on [phone] - obtain more records when office opens  - Mosque - explained in detail about possibility of further bleed and it can be life threatening with out transfusion - she and family understands and declines any blood product.   - will minimize blood work   - discussed about possible use of epogen - patient is fine with use.  - iron panel from 06/2020- low normal iron level - will start on supplement   -  home medications reviewed and restarted as indicated.

## 2020-09-13 NOTE — CONSULT NOTE ADULT - ASSESSMENT
84 y.o female patient with PMH of non-ischemic cardiomyopathy (EF 20-25% in 6/2020), CRT-D, severe pulmonary HTN, CKD III-IV, DLD, history of chronic DVT and PE on Eliquis, RA presented to the ED for gingival bleed and chest pain    # Chest pain  - Atypical  - Recent stress test negative   - Cath in 2014 was normal  - Pain not likely to be cardiac in origin  - Continue telemetry for now    # Palpitations  - Patient with history of V.tach in the past (as per Dr. Mckinney's outpatient notes)  - s/p BiV-ICD  - Please have device interrogated by EP ([facility])  - Continue telemetry    # Non-ischemic cardiomyopathy  - Patient on 40mg of PO lasix 3 days/week and 20mg 2 days per week.  - Not in severe CHF exacerbation but due to her recent orthopnea and shortness of breath, and mild vascular congestion on CXR, would try one dose of Lasix 40mg IV x1 and re-assess (and restart home dose of lasix afterwards)  - Continue Toprol  - Continue Entresto but closely monitor renal function and potassium. As per Dr. Mckinney's outpatient notes, patient had hyperkalemia while on ACE-I and ARB and she was taken off of these medications    # Gingival bleed  - No acute anemia but patient has chronic anemia  - Patient on Eliquis for DVT/PE; however, outpatient vascular notes recommended discontinuing Eliquis if it was for DVT  - Patient on Clopidogrel and also says she's taking aspirin; it's unclear why; as per [facility]'s discharge papers, she had a recent NSTEMI there  - Patient doesn't know why she's on Aspirin, Plavix and Eliquis and she states that she was told that she should be on these medications to avoid a stroke  - OK to hold Eliquis for now  - Dental evaluation  - PLEASE, get records from Dr. Baez (EP), Dr. Celeste (primary care) and contact Dr. Mckinney in AM; a better history regarding her medications is important for further care, especially that the patient is a Hinduism and she refuses blood transfusion. She understands that they can be life-saving but still refuses. Therefore, triple therapy with DAPT and Eliquis should be carefully assessed

## 2020-09-13 NOTE — CONSULT NOTE ADULT - SUBJECTIVE AND OBJECTIVE BOX
HPI:  84 y.o female patient with PMH of non-ischemic cardiomyopathy (EF 20-25% in 6/2020), CRT-D, severe pulmonary HTN, CKD III-IV, DLD, history of chronic DVT and PE on Eliquis, RA presented to the ED for gingival bleed and chest pain.   History goes back to 9/7 when the patient had orthodontic surgery and 4 teeth were extracted. Since then, the patient has been complaining of gingival bleed. Patient seeked medical attention at [facility] on 9/8 and 9/9 for which she was discharged on "Prickly Jose" and was kept on her plavix and apixaban.   Patient has been having daily episodes of gingival bleeding since then at varied times of the day.  On 9/12, patient during the day feels blood in her mouth and then moments after, experiences left sided chest discomfort, pressure like in quality, 3/10 in intensity, radiating to her left subscapular region, associated with mild dyspnea and anxiety. She states that these episodes were associated with palpitations which she's been having more frequently in the last week.    Patient has multiple admissions lately and last one was for melena/BRBPR for which EGD, colonoscopy and capsule endoscopy were done with no clear source of bleeding.     Patient reports that she's been taking aspirin, plavix and Eliquis. She is not sure why she's on all these medications but she was told that she has to take them to prevent strokes (no history of atrial fibrillation or previous strokes are known to the patient or documented). However, in a recent admission to [facility], she was diagnosed with NSTEMI (possibly why she's on DAPT???)    PAST MEDICAL & SURGICAL HISTORY  DVT, lower extremity    Rheumatoid arthritis    Diastolic heart failure    Diverticulitis  sigmoid    Gout    Hypertension    Pacemaker    Chronic kidney disease    History of hysterectomy    History of tonsillectomy    History of appendectomy    Artificial pacemaker        FAMILY HISTORY:  FAMILY HISTORY:  FH: arthritis  sister        SOCIAL HISTORY:  []smoker  []Alcohol  []Drug    ALLERGIES:  Keflex (Swelling)      MEDICATIONS:  MEDICATIONS  (STANDING):  atorvastatin 40 milliGRAM(s) Oral at bedtime  calcium acetate 667 milliGRAM(s) Oral three times a day with meals  ferrous    sulfate 325 milliGRAM(s) Oral daily  folic acid 1 milliGRAM(s) Oral daily  furosemide    Tablet 20 milliGRAM(s) Oral daily  heparin   Injectable 5000 Unit(s) SubCutaneous every 12 hours  influenza   Vaccine 0.5 milliLiter(s) IntraMuscular once  latanoprost 0.005% Ophthalmic Solution 1 Drop(s) Both EYES at bedtime  metoprolol succinate ER 25 milliGRAM(s) Oral daily  pantoprazole    Tablet 40 milliGRAM(s) Oral before breakfast  sacubitril 24 mG/valsartan 26 mG 1 Tablet(s) Oral two times a day  sertraline 50 milliGRAM(s) Oral daily    MEDICATIONS  (PRN):      HOME MEDICATIONS:  Home Medications:  apixaban 2.5 mg oral tablet: 1 tab(s) orally 2 times a day (13 Sep 2020 08:58)  atorvastatin 40 mg oral tablet: 1 tab(s) orally once a day (13 Sep 2020 08:58)  calcium acetate 667 mg oral tablet: 1 tab(s) orally once a day (13 Sep 2020 08:58)  folic acid 1 mg oral tablet: 1 tab(s) orally once a day (13 Sep 2020 08:58)  furosemide 20 mg oral tablet: 1 tab(s) orally Tuesday, Thursday, Saturday, Sunday (13 Sep 2020 08:58)  latanoprost 0.005% ophthalmic solution: 1 drop(s) to each affected eye once a day (in the evening) (13 Sep 2020 08:58)  Metoprolol Succinate ER 25 mg oral tablet, extended release: 1 tab(s) orally once a day (13 Sep 2020 08:58)  Plavix 75 mg oral tablet: 1 tab(s) orally once a day (13 Sep 2020 08:58)  sertraline 50 mg oral tablet: 1 tab(s) orally once a day (13 Sep 2020 08:58)      VITALS:   T(F): 97.8 (09-13 @ 07:37), Max: 99 (09-12 @ 20:55)  HR: 81 (09-13 @ 07:37) (60 - 81)  BP: 158/79 (09-13 @ 07:37) (158/79 - 181/80)  BP(mean): --  RR: 18 (09-13 @ 07:37) (18 - 20)  SpO2: 98% (09-13 @ 07:37) (95% - 100%)    I&O's Summary      REVIEW OF SYSTEMS:  CONSTITUTIONAL: No weakness, fevers or chills  EYES: No visual changes  ENT: No vertigo or throat pain   NECK: No pain or stiffness  RESPIRATORY: No cough, wheezing, hemoptysis; No shortness of breath  CARDIOVASCULAR: No chest pain or palpitations  GASTROINTESTINAL: No abdominal or epigastric pain. No nausea, vomiting, or hematemesis; No diarrhea or constipation. No melena or hematochezia.  GENITOURINARY: No dysuria, frequency or hematuria  NEUROLOGICAL: No numbness or weakness  SKIN: No itching, no rashes  MSK: No pain    PHYSICAL EXAM:  NEURO: patient is awake , alert and oriented  GEN: Not in acute distress  NECK: no thyroid enlargement, no JVD  LUNGS: Clear to auscultation bilaterally   CARDIOVASCULAR: S1/S2 present, RRR , no murmurs or rubs, no carotid bruits,  + PP bilaterally  ABD: Soft, non-tender, non-distended, +BS  EXT: No FABRICIO  SKIN: Intact    LABS:                        9.3    7.09  )-----------( 310      ( 12 Sep 2020 22:25 )             31.2     09-12    136  |  102  |  67<HH>  ----------------------------<  94  6.2<HH>   |  22  |  2.9<H>    Ca    8.6      12 Sep 2020 22:25    TPro  7.3  /  Alb  3.9  /  TBili  <0.2  /  DBili  x   /  AST  38  /  ALT  14  /  AlkPhos  120<H>  09-12    PT/INR - ( 12 Sep 2020 22:25 )   PT: 14.00 sec;   INR: 1.22 ratio         PTT - ( 12 Sep 2020 22:25 )  PTT:35.0 sec  Troponin T, Serum: <0.01 ng/mL (09-12-20 @ 22:25)    CARDIAC MARKERS ( 12 Sep 2020 22:25 )  x     / <0.01 ng/mL / x     / x     / x            Troponin trend:    Serum Pro-Brain Natriuretic Peptide: 4382 pg/mL (09-12-20 @ 22:25)          RADIOLOGY:  -CXR:  -TTE:  -CCTA:  -STRESS TEST:  -CATHETERIZATION:    ECG:    TELEMETRY EVENTS:   HPI:  84 y.o female patient with PMH of non-ischemic cardiomyopathy (EF 20-25% in 6/2020), CRT-D, severe pulmonary HTN, CKD III-IV, DLD, history of chronic DVT and PE on Eliquis, RA presented to the ED for gingival bleed and chest pain.   History goes back to 9/7 when the patient had orthodontic surgery and 4 teeth were extracted. Since then, the patient has been complaining of gingival bleed. Patient seeked medical attention at [facility] on 9/8 and 9/9 for which she was discharged on "Prickly Jose" and was kept on her plavix and apixaban.   Patient has been having daily episodes of gingival bleeding since then at varied times of the day.  On 9/12, patient during the day feels blood in her mouth and then moments after, experiences left sided chest discomfort, pressure like in quality, 3/10 in intensity, radiating to her left subscapular region, associated with mild dyspnea and anxiety. Episodes last for about 3 minutes and resolve spontaneously. She states that these episodes were associated with palpitations which she's been having more frequently in the last week; she denies LOC    Patient has multiple admissions lately and last one was for melena/BRBPR for which EGD, colonoscopy and capsule endoscopy were done with no clear source of bleeding.     Patient reports that she's been taking aspirin, plavix and Eliquis. She is not sure why she's on all these medications but she was told that she has to take them to prevent strokes (no history of atrial fibrillation or previous strokes are known to the patient or documented). However, in a recent admission to [facility], she was diagnosed with NSTEMI (possibly why she's on DAPT???)    Patient is a Religious and refuses any blood transfusions.    PAST MEDICAL & SURGICAL HISTORY  DVT, lower extremity    Rheumatoid arthritis    Diastolic heart failure    Diverticulitis  sigmoid    Gout    Hypertension    Pacemaker    Chronic kidney disease    History of hysterectomy    History of tonsillectomy    History of appendectomy    Artificial pacemaker        FAMILY HISTORY:  FAMILY HISTORY:  FH: arthritis  sister        SOCIAL HISTORY:  []smoker: never  []Alcohol: none  []Drug: none    ALLERGIES:  Keflex (Swelling)      MEDICATIONS:  MEDICATIONS  (STANDING):  atorvastatin 40 milliGRAM(s) Oral at bedtime  calcium acetate 667 milliGRAM(s) Oral three times a day with meals  ferrous    sulfate 325 milliGRAM(s) Oral daily  folic acid 1 milliGRAM(s) Oral daily  furosemide    Tablet 20 milliGRAM(s) Oral daily  heparin   Injectable 5000 Unit(s) SubCutaneous every 12 hours  influenza   Vaccine 0.5 milliLiter(s) IntraMuscular once  latanoprost 0.005% Ophthalmic Solution 1 Drop(s) Both EYES at bedtime  metoprolol succinate ER 25 milliGRAM(s) Oral daily  pantoprazole    Tablet 40 milliGRAM(s) Oral before breakfast  sacubitril 24 mG/valsartan 26 mG 1 Tablet(s) Oral two times a day  sertraline 50 milliGRAM(s) Oral daily    MEDICATIONS  (PRN):      HOME MEDICATIONS:  Home Medications:  apixaban 2.5 mg oral tablet: 1 tab(s) orally 2 times a day (13 Sep 2020 08:58)  atorvastatin 40 mg oral tablet: 1 tab(s) orally once a day (13 Sep 2020 08:58)  calcium acetate 667 mg oral tablet: 1 tab(s) orally once a day (13 Sep 2020 08:58)  folic acid 1 mg oral tablet: 1 tab(s) orally once a day (13 Sep 2020 08:58)  furosemide 20 mg oral tablet: 1 tab(s) orally Tuesday, Thursday, Saturday, Sunday (13 Sep 2020 08:58)  latanoprost 0.005% ophthalmic solution: 1 drop(s) to each affected eye once a day (in the evening) (13 Sep 2020 08:58)  Metoprolol Succinate ER 25 mg oral tablet, extended release: 1 tab(s) orally once a day (13 Sep 2020 08:58)  Plavix 75 mg oral tablet: 1 tab(s) orally once a day (13 Sep 2020 08:58)  sertraline 50 mg oral tablet: 1 tab(s) orally once a day (13 Sep 2020 08:58)      VITALS:   T(F): 97.8 (09-13 @ 07:37), Max: 99 (09-12 @ 20:55)  HR: 81 (09-13 @ 07:37) (60 - 81)  BP: 158/79 (09-13 @ 07:37) (158/79 - 181/80)  BP(mean): --  RR: 18 (09-13 @ 07:37) (18 - 20)  SpO2: 98% (09-13 @ 07:37) (95% - 100%)    I&O's Summary      REVIEW OF SYSTEMS:  CONSTITUTIONAL: No weakness, fevers or chills  EYES: No visual changes  ENT: No vertigo or throat pain   NECK: No pain or stiffness  RESPIRATORY: Shortness of breath as described in HPI  CARDIOVASCULAR: Chest pain, palpitations as described in HPI. Orthopnea  GASTROINTESTINAL: No abdominal or epigastric pain. No nausea, vomiting, or hematemesis; No diarrhea or constipation. No melena or hematochezia.  GENITOURINARY: No dysuria, frequency or hematuria  NEUROLOGICAL: No numbness or weakness  SKIN: No itching, no rashes  MSK: No pain    PHYSICAL EXAM:  NEURO: patient is awake , alert and oriented  GEN: Not in acute distress  NECK: no thyroid enlargement, no JVD  LUNGS: Clear to auscultation bilaterally   CARDIOVASCULAR: S1/S2 present, RRR   ABD: Soft, non-tender, non-distended  EXT: No FABRICIO  SKIN: Intact    LABS:                        9.3    7.09  )-----------( 310      ( 12 Sep 2020 22:25 )             31.2     09-12    136  |  102  |  67<HH>  ----------------------------<  94  6.2<HH>   |  22  |  2.9<H>    Ca    8.6      12 Sep 2020 22:25    TPro  7.3  /  Alb  3.9  /  TBili  <0.2  /  DBili  x   /  AST  38  /  ALT  14  /  AlkPhos  120<H>  09-12    PT/INR - ( 12 Sep 2020 22:25 )   PT: 14.00 sec;   INR: 1.22 ratio       PTT - ( 12 Sep 2020 22:25 )  PTT:35.0 sec  Troponin T, Serum: <0.01 ng/mL (09-12-20 @ 22:25)    CARDIAC MARKERS ( 12 Sep 2020 22:25 )  x     / <0.01 ng/mL / x     / x     / x        Serum Pro-Brain Natriuretic Peptide: 4382 pg/mL (09-12-20 @ 22:25)      RADIOLOGY:  -CXR:  < from: Xray Chest 1 View- PORTABLE-Urgent (Xray Chest 1 View- PORTABLE-Urgent .) (09.13.20 @ 00:22) >  Impression:    Mild pulmonary vascular congestion    < end of copied text >    -TTE:    < from: Transthoracic Echocardiogram (06.21.20 @ 11:33) >  Summary:   1. Left ventricular ejection fraction, by visual estimation, is 20 to 25%.   2. Severely decreased global left ventricular systolic function.   3. Spectral Doppler shows pseudonormal pattern of left ventricular myocardial filling (Grade II diastolic dysfunction).   4. Left atrium is mildly enlarged.   5. Mildly enlarged right atrium.   6. Mild mitral valve regurgitation.   7. Mild thickening of the anterior and posterior mitral valve leaflets.   8. Moderate tricuspid regurgitation.   9. Estimated pulmonary artery systolic pressure is 63.7 mmHg assuming a right atrial pressure of 8 mmHg, which is consistent with severe pulmonary hypertension.    < end of copied text >    -CCTA:  -STRESS TEST:    < from: NM Nuclear Stress Pharmacologic Multiple (06.22.20 @ 11:26) >  Impression:   1. NORMAL ADENOSINE / REST MYOCARDIAL PERFUSION SPECT TOMOGRAPHY, WITH NO EVIDENCE FOR ISCHEMIA DURING ADENOSINE INFUSION.   2. NORMAL RESTING LEFT VENTRICULAR WALL MOTION AND WALL THICKENING.  3. LEFT VENTRICULAR EJECTION FRACTION OF  56 % WHICH IS WITHIN RANGE OF NORMAL.     < end of copied text >    -CATHETERIZATION: 2014: normal cath    ECG: ventricular paced    TELEMETRY EVENTS:

## 2020-09-13 NOTE — H&P ADULT - HISTORY OF PRESENT ILLNESS
*** PT IS A JEHOVA'S WITNESS - REFUSES ANY BLOOD PRODUCTS EVEN IF RISK OF DEATH ***    84 year old female patient presenting to [facility] for assessment of gingival bleed and two episodes of chest pain.    Known non-ischemic cardiomyopathy [6/21/20 - LVEF 20-25%, GIIDD, PASP 63mmHg] [6/22/20 - normal adenosine stress / rest myocardial perfusion scan], CRT-d [employer] deployed in 2004 (Dr. Baez)  Known history of chronic left popliteal DVT, history of PE, CKD III baseline 2-2.2, dyslipidemic.   Recent admission to [facility] for bright red blood streaked BMs, followed as an outpatient with capsule endoscopy with Dr. Tierney.    Current history goes back to 9/7 when the patient had orthodontic surgery removing several teeth in the mandible. Since then, the patient has been complaining of gingival bleed. Patient seeked medical attention at [facility] on 9/8 and 9/9 for which she was discharged on "Prickly Jose" and was kept on her plavix and apixaban.   Patient has been having daily episodes of gingival bleeding since then at varied times of the day.  On 9/12, patient during the day feels blood in her mouth and then moments after, experiences left sided chest discomfort, pressure like in quality, 3/10 in intensity, radiating to her left subscapular region, associated with mild dyspnea and anxiety. No diaphoresis, no palpitations, no dizziness, no N/V. Patient's pain self resolved after a few minutes.

## 2020-09-14 LAB
ALBUMIN SERPL ELPH-MCNC: 3.4 G/DL — LOW (ref 3.5–5.2)
ALP SERPL-CCNC: 111 U/L — SIGNIFICANT CHANGE UP (ref 30–115)
ALT FLD-CCNC: 14 U/L — SIGNIFICANT CHANGE UP (ref 0–41)
ANION GAP SERPL CALC-SCNC: 14 MMOL/L — SIGNIFICANT CHANGE UP (ref 7–14)
AST SERPL-CCNC: 26 U/L — SIGNIFICANT CHANGE UP (ref 0–41)
BASOPHILS # BLD AUTO: 0.06 K/UL — SIGNIFICANT CHANGE UP (ref 0–0.2)
BASOPHILS NFR BLD AUTO: 0.9 % — SIGNIFICANT CHANGE UP (ref 0–1)
BILIRUB SERPL-MCNC: <0.2 MG/DL — SIGNIFICANT CHANGE UP (ref 0.2–1.2)
BUN SERPL-MCNC: 65 MG/DL — CRITICAL HIGH (ref 10–20)
CALCIUM SERPL-MCNC: 8.8 MG/DL — SIGNIFICANT CHANGE UP (ref 8.5–10.1)
CHLORIDE SERPL-SCNC: 103 MMOL/L — SIGNIFICANT CHANGE UP (ref 98–110)
CHOLEST SERPL-MCNC: 159 MG/DL — SIGNIFICANT CHANGE UP (ref 100–200)
CO2 SERPL-SCNC: 22 MMOL/L — SIGNIFICANT CHANGE UP (ref 17–32)
CREAT SERPL-MCNC: 2.7 MG/DL — HIGH (ref 0.7–1.5)
EOSINOPHIL # BLD AUTO: 0.69 K/UL — SIGNIFICANT CHANGE UP (ref 0–0.7)
EOSINOPHIL NFR BLD AUTO: 10.6 % — HIGH (ref 0–8)
GLUCOSE SERPL-MCNC: 119 MG/DL — HIGH (ref 70–99)
HCT VFR BLD CALC: 30.4 % — LOW (ref 37–47)
HDLC SERPL-MCNC: 40 MG/DL — LOW
HGB BLD-MCNC: 9.2 G/DL — LOW (ref 12–16)
IMM GRANULOCYTES NFR BLD AUTO: 0.3 % — SIGNIFICANT CHANGE UP (ref 0.1–0.3)
LIPID PNL WITH DIRECT LDL SERPL: 101 MG/DL — SIGNIFICANT CHANGE UP (ref 4–129)
LYMPHOCYTES # BLD AUTO: 2.01 K/UL — SIGNIFICANT CHANGE UP (ref 1.2–3.4)
LYMPHOCYTES # BLD AUTO: 30.8 % — SIGNIFICANT CHANGE UP (ref 20.5–51.1)
MCHC RBC-ENTMCNC: 28.5 PG — SIGNIFICANT CHANGE UP (ref 27–31)
MCHC RBC-ENTMCNC: 30.3 G/DL — LOW (ref 32–37)
MCV RBC AUTO: 94.1 FL — SIGNIFICANT CHANGE UP (ref 81–99)
MONOCYTES # BLD AUTO: 0.88 K/UL — HIGH (ref 0.1–0.6)
MONOCYTES NFR BLD AUTO: 13.5 % — HIGH (ref 1.7–9.3)
NEUTROPHILS # BLD AUTO: 2.86 K/UL — SIGNIFICANT CHANGE UP (ref 1.4–6.5)
NEUTROPHILS NFR BLD AUTO: 43.9 % — SIGNIFICANT CHANGE UP (ref 42.2–75.2)
NRBC # BLD: 0 /100 WBCS — SIGNIFICANT CHANGE UP (ref 0–0)
PLATELET # BLD AUTO: 303 K/UL — SIGNIFICANT CHANGE UP (ref 130–400)
POTASSIUM SERPL-MCNC: 4.9 MMOL/L — SIGNIFICANT CHANGE UP (ref 3.5–5)
POTASSIUM SERPL-SCNC: 4.9 MMOL/L — SIGNIFICANT CHANGE UP (ref 3.5–5)
PROT SERPL-MCNC: 6.2 G/DL — SIGNIFICANT CHANGE UP (ref 6–8)
RBC # BLD: 3.23 M/UL — LOW (ref 4.2–5.4)
RBC # FLD: 13.4 % — SIGNIFICANT CHANGE UP (ref 11.5–14.5)
SARS-COV-2 IGG SERPL QL IA: NEGATIVE — SIGNIFICANT CHANGE UP
SARS-COV-2 IGM SERPL IA-ACNC: 13.5 AU/ML — SIGNIFICANT CHANGE UP
SODIUM SERPL-SCNC: 139 MMOL/L — SIGNIFICANT CHANGE UP (ref 135–146)
TOTAL CHOLESTEROL/HDL RATIO MEASUREMENT: 4 RATIO — SIGNIFICANT CHANGE UP (ref 4–5.5)
TRIGL SERPL-MCNC: 85 MG/DL — SIGNIFICANT CHANGE UP (ref 10–149)
TROPONIN T SERPL-MCNC: <0.01 NG/ML — SIGNIFICANT CHANGE UP
TSH SERPL-MCNC: 1.67 UIU/ML — SIGNIFICANT CHANGE UP (ref 0.27–4.2)
WBC # BLD: 6.52 K/UL — SIGNIFICANT CHANGE UP (ref 4.8–10.8)
WBC # FLD AUTO: 6.52 K/UL — SIGNIFICANT CHANGE UP (ref 4.8–10.8)

## 2020-09-14 PROCEDURE — 93289 INTERROG DEVICE EVAL HEART: CPT | Mod: 26

## 2020-09-14 PROCEDURE — 99233 SBSQ HOSP IP/OBS HIGH 50: CPT

## 2020-09-14 RX ORDER — AMLODIPINE BESYLATE 2.5 MG/1
5 TABLET ORAL ONCE
Refills: 0 | Status: COMPLETED | OUTPATIENT
Start: 2020-09-14 | End: 2020-09-14

## 2020-09-14 RX ORDER — APIXABAN 2.5 MG/1
2.5 TABLET, FILM COATED ORAL
Refills: 0 | Status: DISCONTINUED | OUTPATIENT
Start: 2020-09-14 | End: 2020-09-15

## 2020-09-14 RX ORDER — FUROSEMIDE 40 MG
40 TABLET ORAL ONCE
Refills: 0 | Status: COMPLETED | OUTPATIENT
Start: 2020-09-14 | End: 2020-09-14

## 2020-09-14 RX ADMIN — ATORVASTATIN CALCIUM 40 MILLIGRAM(S): 80 TABLET, FILM COATED ORAL at 21:48

## 2020-09-14 RX ADMIN — Medication 667 MILLIGRAM(S): at 17:23

## 2020-09-14 RX ADMIN — Medication 1 MILLIGRAM(S): at 12:06

## 2020-09-14 RX ADMIN — LATANOPROST 1 DROP(S): 0.05 SOLUTION/ DROPS OPHTHALMIC; TOPICAL at 21:48

## 2020-09-14 RX ADMIN — SERTRALINE 50 MILLIGRAM(S): 25 TABLET, FILM COATED ORAL at 12:06

## 2020-09-14 RX ADMIN — SACUBITRIL AND VALSARTAN 1 TABLET(S): 24; 26 TABLET, FILM COATED ORAL at 05:17

## 2020-09-14 RX ADMIN — Medication 40 MILLIGRAM(S): at 13:40

## 2020-09-14 RX ADMIN — Medication 25 MILLIGRAM(S): at 05:17

## 2020-09-14 RX ADMIN — AMLODIPINE BESYLATE 5 MILLIGRAM(S): 2.5 TABLET ORAL at 05:16

## 2020-09-14 RX ADMIN — Medication 20 MILLIGRAM(S): at 05:17

## 2020-09-14 RX ADMIN — HEPARIN SODIUM 5000 UNIT(S): 5000 INJECTION INTRAVENOUS; SUBCUTANEOUS at 05:17

## 2020-09-14 RX ADMIN — Medication 667 MILLIGRAM(S): at 12:06

## 2020-09-14 RX ADMIN — Medication 325 MILLIGRAM(S): at 12:06

## 2020-09-14 RX ADMIN — APIXABAN 2.5 MILLIGRAM(S): 2.5 TABLET, FILM COATED ORAL at 17:23

## 2020-09-14 RX ADMIN — Medication 667 MILLIGRAM(S): at 07:53

## 2020-09-14 RX ADMIN — PANTOPRAZOLE SODIUM 40 MILLIGRAM(S): 20 TABLET, DELAYED RELEASE ORAL at 06:23

## 2020-09-14 RX ADMIN — SACUBITRIL AND VALSARTAN 1 TABLET(S): 24; 26 TABLET, FILM COATED ORAL at 17:23

## 2020-09-14 NOTE — PROGRESS NOTE ADULT - ASSESSMENT
84 year old female patient presenting to ED for assessment of gingival bleed and two episodes of chest pain.  Known non-ischemic cardiomyopathy [6/21/20 - LVEF 20-25%, GIIDD, PASP 63mmHg] [6/22/20 - normal adenosine stress / rest myocardial perfusion scan    Gingival bleeding  CP likely musculoskeletal  Non Ischemic CM (EF 20-25%)  Ch. HFrEF  H/O Chronic DVT & PE  OMERO on CKD-IV          PLAN:    No active gingival bleeding. H/H remains stable  Called pt's cardiologist Dr. Mckinney and discussed pt care with him. As per cardiologist pt had cardiac cath done in the past and had non obstructive CAD. No need for DAPT. Will D/C ASA and Plavix, but will cont Eliquis due to H/O recurrent chronic DVT & PE  Cont her other home meds.   CE x 2 negative  D/C tele  Restart Eliquis 5 mg po q 12h and watch for bleeding for 24 hrs  Cr trending up. Monitor renal function. Check urine lytes.   Renal/bladder US

## 2020-09-14 NOTE — CHART NOTE - NSCHARTNOTEFT_GEN_A_CORE
Medtronic BiV AICD interrogated 9/14/20  No events and device functioning properly    Will review with attending  Any questions call [phone]

## 2020-09-14 NOTE — PROGRESS NOTE ADULT - SUBJECTIVE AND OBJECTIVE BOX
SUBJECTIVE:  Patient is a 85y old Female who presents with a chief complaint of Chest pain, gingival bleeding (13 Sep 2020 12:44)   Currently admitted to medicine with the primary diagnosis of Chest pain     Today is hospital day 1d. This morning she is resting comfortably in bed and reports no new issues or overnight events.     Had gingival bleeding last night    PAST MEDICAL & SURGICAL HISTORY  DVT, lower extremity    Rheumatoid arthritis    Diastolic heart failure    Diverticulitis  sigmoid    Gout    Hypertension    Pacemaker    Chronic kidney disease    History of hysterectomy    History of tonsillectomy    History of appendectomy    Artificial pacemaker      SOCIAL HISTORY:  Negative for smoking/alcohol/drug use.     ALLERGIES:  Keflex (Swelling)    MEDICATIONS:  HOME MEDICATIONS  apixaban 2.5 mg oral tablet: 1 tab(s) orally 2 times a day  atorvastatin 40 mg oral tablet: 1 tab(s) orally once a day  calcium acetate 667 mg oral tablet: 1 tab(s) orally once a day  Entresto 24 mg-26 mg oral tablet: 1 tab(s) orally 2 times a day  ferrous sulfate 325 mg (65 mg elemental iron) oral tablet: 1 tab(s) orally once a day   folic acid 1 mg oral tablet: 1 tab(s) orally once a day  furosemide 20 mg oral tablet: 1 tab(s) orally Tuesday, Thursday, Saturday, Sunday  latanoprost 0.005% ophthalmic solution: 1 drop(s) to each affected eye once a day (in the evening)  Metoprolol Succinate ER 25 mg oral tablet, extended release: 1 tab(s) orally once a day  pantoprazole 40 mg oral delayed release tablet: 1 tab(s) orally once a day (before a meal)  Plavix 75 mg oral tablet: 1 tab(s) orally once a day  sertraline 50 mg oral tablet: 1 tab(s) orally once a day    STANDING MEDICATIONS  atorvastatin 40 milliGRAM(s) Oral at bedtime  calcium acetate 667 milliGRAM(s) Oral three times a day with meals  ferrous    sulfate 325 milliGRAM(s) Oral daily  folic acid 1 milliGRAM(s) Oral daily  furosemide    Tablet 20 milliGRAM(s) Oral daily  heparin   Injectable 5000 Unit(s) SubCutaneous every 12 hours  latanoprost 0.005% Ophthalmic Solution 1 Drop(s) Both EYES at bedtime  metoprolol succinate ER 25 milliGRAM(s) Oral daily  pantoprazole    Tablet 40 milliGRAM(s) Oral before breakfast  sacubitril 24 mG/valsartan 26 mG 1 Tablet(s) Oral two times a day  sertraline 50 milliGRAM(s) Oral daily    PRN MEDICATIONS    VITALS:   T(F): 97.6  HR: 63  BP: 171/84  RR: 17  SpO2: 99%    LABS:                        9.2    6.52  )-----------( 303      ( 14 Sep 2020 06:11 )             30.4     09-14    139  |  103  |  65<HH>  ----------------------------<  119<H>  4.9   |  22  |  2.7<H>    Ca    8.8      14 Sep 2020 06:11    TPro  6.2  /  Alb  3.4<L>  /  TBili  <0.2  /  DBili  x   /  AST  26  /  ALT  14  /  AlkPhos  111  09-14    PT/INR - ( 12 Sep 2020 22:25 )   PT: 14.00 sec;   INR: 1.22 ratio       PTT - ( 12 Sep 2020 22:25 )  PTT:35.0 sec    Troponin T, Serum: <0.01 ng/mL (09-14-20 @ 06:11)    CARDIAC MARKERS ( 14 Sep 2020 06:11 )  x     / <0.01 ng/mL / x     / x     / x      CARDIAC MARKERS ( 12 Sep 2020 22:25 )  x     / <0.01 ng/mL / x     / x     / x        PHYSICAL EXAM:  GEN: No acute distress  HEENT: Normocephalic. Clotted blood seen in Lower gingiva  NECK: Supple  LUNGS: Clear to auscultation bilaterally   HEART: Regular  ABD: Soft, non-tender, non-distended.  EXT: Skin Intact  NEURO: AAOX3  PSYCH: Appropriate mood, responds appropriately

## 2020-09-14 NOTE — PROGRESS NOTE ADULT - ASSESSMENT
84F with PMH of non-ischemic cardiomyopathy (EF 20-25% in 6/2020), CRT-D, severe pulmonary HTN, CKD III-IV, DLD, history of chronic DVT and PE on Eliquis, RA presented to the ED for gingival bleed and chest pain    # Gingival bleed  - No acute anemia but patient has chronic anemia  - On Eliquis, ASA and Plavix at home. All 3 held during this hospitalization  - f/u dental evaluation  - PLEASE, get records from Dr. Baez (EP), Dr. Celeste (primary care) and contact Dr. Mckinney in AM; a better history regarding her medications is important for further care, especially that the patient is a [employer] and she refuses blood transfusion. She understands that they can be life-saving but still refuses. Therefore, triple therapy with DAPT and Eliquis should be carefully assessed as per cards    # Atypical Chest pain  - Recent nuclear stress test 06/2020 negative  - Cath in 2014 was normal  - Trops -ve at admission  - Cardiac cause less likely  - Continue telemetry for now    # Palpitations  - Patient with history of V.tach in the past (as per Dr. Mckinney's outpatient notes)  - s/p BiV-ICD  - Please have device interrogated by EP ([employer]tr[employer])  - Continue telemetry    # Non-ischemic cardiomyopathy  - Patient on 40mg of PO lasix 3 days/week and 20mg 2 days per week.  - Not in severe CHF exacerbation but due to her recent orthopnea and shortness of breath, and mild vascular congestion on CXR, would try one dose of Lasix 40mg IV x1 and re-assess (and restart home dose of lasix afterwards)  - Continue Toprol  - Continue Entresto but closely monitor renal function and potassium. As per Dr. Mckinney's outpatient notes, patient had hyperkalemia while on ACE-I and ARB and she was taken off of these medications    # Dyslipidemic  - Continue atorvastatin 40mg qd  - Follow morning lipid panel     # History of PE/DVT  - Holding apixaban given bleed     # Glaucoma  - Continue eye drops    # CKD III  - Creatinine at baseline    Diet > DASH/TLC 84F with PMH of non-ischemic cardiomyopathy (EF 20-25% in 6/2020), CRT-D, severe pulmonary HTN, CKD III-IV, DLD, history of chronic DVT and PE on Eliquis, RA presented to the ED for gingival bleed and chest pain    # Gingival bleed  - No acute anemia but patient has chronic anemia  - On Eliquis, ASA and Plavix at home. All 3 held during this hospitalization  - f/u dental evaluation  - PLEASE, get records from Dr. Baez (EP), Dr. Celeste (primary care) and contact Dr. Mckinney in AM; a better history regarding her medications is important for further care, especially that the patient is a [employer] and she refuses blood transfusion. She understands that they can be life-saving but still refuses. Therefore, triple therapy with DAPT and Eliquis should be carefully assessed as per cards    # Atypical Chest pain  - Recent nuclear stress test 06/2020 negative  - Cath in 2014 was normal  - Trops -ve at admission  - Cardiac cause less likely  - Continue telemetry for now    # Palpitations  - Patient with history of V.tach in the past (as per Dr. Mckinney's outpatient notes)  - s/p BiV-ICD  - Please have device interrogated by EP ([employer]tr[employer])  - Continue telemetry    # Non-ischemic cardiomyopathy  - Patient on 40mg of PO lasix 3 days/week and 20mg 2 days per week.  - Not in severe CHF exacerbation but due to her recent orthopnea and shortness of breath, and mild vascular congestion on CXR, would try one dose of Lasix 40mg IV x1 and re-assess (and restart home dose of lasix afterwards)  - Continue Toprol  - Continue Entresto but closely monitor renal function and potassium. As per Dr. Mckinney's outpatient notes, patient had hyperkalemia while on ACE-I and ARB and she was taken off of these medications    # Dyslipidemic  - Continue atorvastatin 40mg qd  - Follow morning lipid panel     # History of PE/DVT  - Holding apixaban given bleed     # Glaucoma  - Continue eye drops    # CKD III  - Creatinine at baseline    Diet > DASH/TLC 84F with PMH of non-ischemic cardiomyopathy (EF 20-25% in 6/2020) s/p CRT-D, severe pulmonary HTN, CKD III-IV, DLD, history of chronic DVT and PE on Eliquis, RA presented to the ED for chest pain and gingival bleed    # Gingival bleed  - No acute anemia but patient has chronic anemia  - f/u dental evaluation  - Taking Eliquis, ASA and Plavix at home. All 3 held during this hospitalization  - d/c plavix. Start on ASA and Eliquis only as per Dr. Baez EP, after gingival bleeding stops or 3d whichever comes later  - Pt is a [employer] and refuses blood transfusion even in life-threatening conditions    # Atypical Chest pain  - Recent nuclear stress test 06/2020 negative  - Cath in 2014 was normal  - Trops -ve at admission  - Cardiac cause less likely  - Continue telemetry for now    # Palpitations  - Patient with history of V.tach in the past (as per Dr. Mckinney's outpatient notes)  - s/p BiV-ICD  - No events recorded on [employer] device by EP on 9/14  - Continue telemetry    # Non-ischemic cardiomyopathy (EF 20-25% in 6/2020)  - s/p BiV-ICD, CRT-D  - Patient on 40mg of PO lasix 3 days/week and 20mg 2 days per week.  - Not in severe CHF exacerbation but due to her recent orthopnea and shortness of breath, and mild vascular congestion on CXR, would try one dose of Lasix 40mg IV x1 and re-assess (and restart home dose of lasix afterwards)  - Continue Toprol  - Continue Entresto but closely monitor renal function and potassium. As per Dr. Mckinney's outpatient notes, patient had hyperkalemia while on ACE-I and ARB and she was taken off of these medications    # Dyslipidemic  - Continue atorvastatin 40mg qd  - Follow morning lipid panel     # History of PE/DVT  - Holding apixaban given bleed. Restart after after gingival bleeding stops or 3d whichever comes later    # Glaucoma  - Continue eye drops    # CKD III  - Creatinine at baseline    Diet > DASH/TLC 84F with PMH of non-ischemic cardiomyopathy (EF 20-25% in 6/2020) s/p CRT-D, severe pulmonary HTN, CKD III-IV, DLD, history of chronic DVT and PE on Eliquis, RA presented to the ED for chest pain and gingival bleed    # Gingival bleed  - No acute anemia but patient has chronic anemia  - f/u dental evaluation  - Taking Eliquis, ASA and Plavix at home. All 3 held during this hospitalization  - d/c plavix. Start ASA and Eliquis only as per Dr. Baez EP, after gingival bleeding stops or 3d whichever comes later  - Pt is a [employer] and refuses blood transfusion even in life-threatening conditions    # Atypical Chest pain  - Recent nuclear stress test 06/2020 negative  - Cath in 2014 was normal  - Trops -ve at admission  - Cardiac cause less likely  - Continue telemetry for now    # Palpitations  - Patient with history of V.tach in the past (as per Dr. Mckinney's outpatient notes)  - s/p BiV-ICD  - No events recorded on [employer] device by EP on 9/14  - Continue telemetry    # Non-ischemic cardiomyopathy (EF 20-25% in 6/2020)  - s/p BiV-ICD, CRT-D  - Patient on 40mg of PO lasix 3 days/week and 20mg 2 days per week.  - CXR showed b/l pulmonary vascular congestion  - Given 1 dose 40 mg IV lasix today as per cardio recs  - Continue home dose lasix, Toprol  - Continue Entresto. Monitor labs for hyperkalemia    # Hyperlipidemia  - Continue atorvastatin 40mg qd    # History of PE/DVT  - Hold apixaban given bleed. Restart after gingival bleeding stops or 3d whichever comes later    # Glaucoma  - Continue eye drops    # CKD III  - Creatinine at baseline    Diet > DASH/TLC 84F with PMH of non-ischemic cardiomyopathy (EF 20-25% in 6/2020) s/p CRT-D, severe pulmonary HTN, CKD III-IV, DLD, history of chronic DVT and PE on Eliquis, RA presented to the ED for chest pain and gingival bleed    # Gingival bleed  - No acute anemia but patient has chronic anemia  - f/u dental evaluation  - Taking Eliquis, ASA and Plavix at home. All 3 held during this hospitalization  - d/c plavix, ASA  - started eliquis 2.5 bid today for age > 2.5, Cr > 1.5  - Pt is a [employer] and refuses blood transfusion even in life-threatening conditions    # Atypical Chest pain  - Recent nuclear stress test 06/2020 negative  - Cath in 2014 was normal  - Trops -ve at admission  - Cardiac cause less likely  - Continue telemetry for now    # Palpitations  - Patient with history of V.tach in the past (as per Dr. Mckinney's outpatient notes)  - s/p BiV-ICD  - No events recorded on [employer] device by EP on 9/14  - Continue telemetry    # Non-ischemic cardiomyopathy (EF 20-25% in 6/2020)  - s/p BiV-ICD, CRT-D  - Patient on 40mg of PO lasix 3 days/week and 20mg 2 days per week.  - CXR showed b/l pulmonary vascular congestion  - Given 1 dose 40 mg IV lasix today as per cardio recs  - Continue home dose lasix, Toprol  - Continue Entresto. Monitor labs for hyperkalemia    # Hyperlipidemia  - Continue atorvastatin 40mg qd    # History of PE/DVT  - Hold apixaban given bleed. Restart after gingival bleeding stops or 3d whichever comes later    # Glaucoma  - Continue eye drops    # CKD III  - Creatinine at baseline    Diet > DASH/TLC

## 2020-09-14 NOTE — PROGRESS NOTE ADULT - SUBJECTIVE AND OBJECTIVE BOX
RACHEL SUMMERS  85y Female    CHIEF COMPLAINT:    Patient is a 85y old  Female who presents with a chief complaint of Chest pain, gingival bleeding (14 Sep 2020 09:05)      INTERVAL HPI/OVERNIGHT EVENTS:    Patient seen and examined. No active gingival bleeding. No cp. No sob.    ROS: All other systems are negative.    Vital Signs:    T(F): 97.6 (20 @ 05:04), Max: 98.2 (20 @ 20:27)  HR: 63 (20 @ 05:04) (62 - 69)  BP: 171/84 (20 @ 05:04) (139/73 - 192/85)  RR: 17 (20 @ 05:04) (17 - 18)  SpO2: 99% (20 @ 07:55) (98% - 100%)  I&O's Summary    13 Sep 2020 07:01  -  14 Sep 2020 07:00  --------------------------------------------------------  IN: 270 mL / OUT: 0 mL / NET: 270 mL    14 Sep 2020 07:01  -  14 Sep 2020 12:43  --------------------------------------------------------  IN: 440 mL / OUT: 500 mL / NET: -60 mL      Daily     Daily Weight in k.9 (14 Sep 2020 05:04)  CAPILLARY BLOOD GLUCOSE          PHYSICAL EXAM:    GENERAL:  NAD  SKIN: No rashes or lesions  HENT: Atraumatic Normocephalic. PERRL. Moist membranes.  NECK: Supple, No JVD. No lymphadenopathy.  PULMONARY: CTA B/L. No wheezing. No rales  CVS: Normal S1, S2. Rate and Rhythm are regular. No murmurs.  ABDOMEN/GI: Soft, Nontender, Nondistended; BS present  EXTREMITIES: Peripheral pulses intact. No edema B/L LE.  NEUROLOGIC:  No motor or sensory deficit.  PSYCH: Alert & oriented x 3    Consultant(s) Notes Reviewed:  [x ] YES  [ ] NO  Care Discussed with Consultants/Other Providers [ x] YES  [ ] NO    EKG reviewed  Telemetry reviewed    LABS:                        9.2    6.52  )-----------( 303      ( 14 Sep 2020 06:11 )             30.4   Hemoglobin: 9.2 g/dL ( @ 06:11)  Hemoglobin: 9.3 g/dL ( @ 16:18)  Hemoglobin: 9.3 g/dL ( @ 22:25)        139  |  103  |  65<HH>  ----------------------------<  119<H>   Creatinine Trend: 2.7<--, 2.7<--, 2.9<--  4.9   |  22  |  2.7<H>    Ca    8.8      14 Sep 2020 06:11    TPro  6.2  /  Alb  3.4<L>  /  TBili  <0.2  /  DBili  x   /  AST  26  /  ALT  14  /  AlkPhos  111      PT/INR - ( 12 Sep 2020 22:25 )   PT: 14.00 sec;   INR: 1.22 ratio         PTT - ( 12 Sep 2020 22:25 )  PTT:35.0 sec  Serum Pro-Brain Natriuretic Peptide: 4382 pg/mL (20 @ 22:25)    Trop <0.01, CKMB --, CK --, 20 @ 06:11  Trop <0.01, CKMB --, CK --, 20 @ 22:25        RADIOLOGY & ADDITIONAL TESTS:      Imaging or report Personally Reviewed:  [ ] YES  [ ] NO    Medications:  Standing  atorvastatin 40 milliGRAM(s) Oral at bedtime  calcium acetate 667 milliGRAM(s) Oral three times a day with meals  ferrous    sulfate 325 milliGRAM(s) Oral daily  folic acid 1 milliGRAM(s) Oral daily  furosemide    Tablet 20 milliGRAM(s) Oral daily  heparin   Injectable 5000 Unit(s) SubCutaneous every 12 hours  latanoprost 0.005% Ophthalmic Solution 1 Drop(s) Both EYES at bedtime  metoprolol succinate ER 25 milliGRAM(s) Oral daily  pantoprazole    Tablet 40 milliGRAM(s) Oral before breakfast  sacubitril 24 mG/valsartan 26 mG 1 Tablet(s) Oral two times a day  sertraline 50 milliGRAM(s) Oral daily    PRN Meds      Case discussed with resident    Care discussed with pt/family

## 2020-09-15 ENCOUNTER — TRANSCRIPTION ENCOUNTER (OUTPATIENT)
Age: 85
End: 2020-09-15

## 2020-09-15 VITALS — DIASTOLIC BLOOD PRESSURE: 70 MMHG | SYSTOLIC BLOOD PRESSURE: 130 MMHG | HEART RATE: 64 BPM

## 2020-09-15 LAB
ALBUMIN SERPL ELPH-MCNC: 3.6 G/DL — SIGNIFICANT CHANGE UP (ref 3.5–5.2)
ALP SERPL-CCNC: 118 U/L — HIGH (ref 30–115)
ALT FLD-CCNC: 14 U/L — SIGNIFICANT CHANGE UP (ref 0–41)
ANION GAP SERPL CALC-SCNC: 10 MMOL/L — SIGNIFICANT CHANGE UP (ref 7–14)
AST SERPL-CCNC: 26 U/L — SIGNIFICANT CHANGE UP (ref 0–41)
BASOPHILS # BLD AUTO: 0.07 K/UL — SIGNIFICANT CHANGE UP (ref 0–0.2)
BASOPHILS NFR BLD AUTO: 0.8 % — SIGNIFICANT CHANGE UP (ref 0–1)
BILIRUB SERPL-MCNC: <0.2 MG/DL — SIGNIFICANT CHANGE UP (ref 0.2–1.2)
BUN SERPL-MCNC: 67 MG/DL — CRITICAL HIGH (ref 10–20)
CALCIUM SERPL-MCNC: 8.9 MG/DL — SIGNIFICANT CHANGE UP (ref 8.5–10.1)
CHLORIDE SERPL-SCNC: 106 MMOL/L — SIGNIFICANT CHANGE UP (ref 98–110)
CO2 SERPL-SCNC: 23 MMOL/L — SIGNIFICANT CHANGE UP (ref 17–32)
CREAT SERPL-MCNC: 2.8 MG/DL — HIGH (ref 0.7–1.5)
EOSINOPHIL # BLD AUTO: 0.92 K/UL — HIGH (ref 0–0.7)
EOSINOPHIL NFR BLD AUTO: 10.5 % — HIGH (ref 0–8)
GLUCOSE SERPL-MCNC: 91 MG/DL — SIGNIFICANT CHANGE UP (ref 70–99)
HCT VFR BLD CALC: 31.3 % — LOW (ref 37–47)
HGB BLD-MCNC: 9.5 G/DL — LOW (ref 12–16)
IMM GRANULOCYTES NFR BLD AUTO: 0.3 % — SIGNIFICANT CHANGE UP (ref 0.1–0.3)
LYMPHOCYTES # BLD AUTO: 2.8 K/UL — SIGNIFICANT CHANGE UP (ref 1.2–3.4)
LYMPHOCYTES # BLD AUTO: 32.1 % — SIGNIFICANT CHANGE UP (ref 20.5–51.1)
MCHC RBC-ENTMCNC: 28.4 PG — SIGNIFICANT CHANGE UP (ref 27–31)
MCHC RBC-ENTMCNC: 30.4 G/DL — LOW (ref 32–37)
MCV RBC AUTO: 93.4 FL — SIGNIFICANT CHANGE UP (ref 81–99)
MONOCYTES # BLD AUTO: 1.17 K/UL — HIGH (ref 0.1–0.6)
MONOCYTES NFR BLD AUTO: 13.4 % — HIGH (ref 1.7–9.3)
NEUTROPHILS # BLD AUTO: 3.74 K/UL — SIGNIFICANT CHANGE UP (ref 1.4–6.5)
NEUTROPHILS NFR BLD AUTO: 42.9 % — SIGNIFICANT CHANGE UP (ref 42.2–75.2)
NRBC # BLD: 0 /100 WBCS — SIGNIFICANT CHANGE UP (ref 0–0)
PLATELET # BLD AUTO: 302 K/UL — SIGNIFICANT CHANGE UP (ref 130–400)
POTASSIUM SERPL-MCNC: 5.3 MMOL/L — HIGH (ref 3.5–5)
POTASSIUM SERPL-SCNC: 5.3 MMOL/L — HIGH (ref 3.5–5)
PROT SERPL-MCNC: 6.5 G/DL — SIGNIFICANT CHANGE UP (ref 6–8)
RBC # BLD: 3.35 M/UL — LOW (ref 4.2–5.4)
RBC # FLD: 13.7 % — SIGNIFICANT CHANGE UP (ref 11.5–14.5)
SODIUM SERPL-SCNC: 139 MMOL/L — SIGNIFICANT CHANGE UP (ref 135–146)
WBC # BLD: 8.73 K/UL — SIGNIFICANT CHANGE UP (ref 4.8–10.8)
WBC # FLD AUTO: 8.73 K/UL — SIGNIFICANT CHANGE UP (ref 4.8–10.8)

## 2020-09-15 PROCEDURE — 99239 HOSP IP/OBS DSCHRG MGMT >30: CPT

## 2020-09-15 RX ORDER — CLOPIDOGREL BISULFATE 75 MG/1
1 TABLET, FILM COATED ORAL
Qty: 0 | Refills: 0 | DISCHARGE

## 2020-09-15 RX ORDER — APIXABAN 2.5 MG/1
1 TABLET, FILM COATED ORAL
Qty: 0 | Refills: 0 | DISCHARGE

## 2020-09-15 RX ORDER — APIXABAN 2.5 MG/1
1 TABLET, FILM COATED ORAL
Qty: 0 | Refills: 0 | DISCHARGE
Start: 2020-09-15

## 2020-09-15 RX ADMIN — Medication 25 MILLIGRAM(S): at 05:00

## 2020-09-15 RX ADMIN — SACUBITRIL AND VALSARTAN 1 TABLET(S): 24; 26 TABLET, FILM COATED ORAL at 05:00

## 2020-09-15 RX ADMIN — Medication 20 MILLIGRAM(S): at 05:00

## 2020-09-15 RX ADMIN — SERTRALINE 50 MILLIGRAM(S): 25 TABLET, FILM COATED ORAL at 12:21

## 2020-09-15 RX ADMIN — Medication 325 MILLIGRAM(S): at 12:21

## 2020-09-15 RX ADMIN — PANTOPRAZOLE SODIUM 40 MILLIGRAM(S): 20 TABLET, DELAYED RELEASE ORAL at 06:21

## 2020-09-15 RX ADMIN — Medication 667 MILLIGRAM(S): at 12:21

## 2020-09-15 RX ADMIN — Medication 667 MILLIGRAM(S): at 08:05

## 2020-09-15 RX ADMIN — APIXABAN 2.5 MILLIGRAM(S): 2.5 TABLET, FILM COATED ORAL at 05:00

## 2020-09-15 RX ADMIN — Medication 1 MILLIGRAM(S): at 12:21

## 2020-09-15 NOTE — DISCHARGE NOTE PROVIDER - NSDCCPCAREPLAN_GEN_ALL_CORE_FT
PRINCIPAL DISCHARGE DIAGNOSIS  Diagnosis: Chest pain  Assessment and Plan of Treatment: Came with atypical chest pain. Cardiac enzymes were negative. BiV-ICD device was interrogated and showed no events. Monitored on telemetry. Likely due to history of ischemic cardiomyopathy. Continued with diuretic therapy. Continued on home beta-blocker. Held entresto due to increased renal markers/high potassium. To be continued and managed by cardiologist (Dr. Mckinney) kelli appointment in 1-2 weeks.      SECONDARY DISCHARGE DIAGNOSES  Diagnosis: Gingival bleeding  Assessment and Plan of Treatment: Had acute gingival bleeding after dentist visit. Stopped antiplatelets. Restarted on anticoagulation. Follow up with dentist for continued care     PRINCIPAL DISCHARGE DIAGNOSIS  Diagnosis: Chest pain  Assessment and Plan of Treatment: Came with atypical chest pain. Cardiac enzymes were negative. BiV-ICD device was interrogated and showed no events. Monitored on telemetry. Likely due to history of ischemic cardiomyopathy. Continued with diuretic therapy. Continued on home beta-blocker, entresto. Started on low potassium diet. To be continued and managed by cardiologist (Dr. Mckinney) kelli appointment in 1-2 weeks.      SECONDARY DISCHARGE DIAGNOSES  Diagnosis: Gingival bleeding  Assessment and Plan of Treatment: Had acute gingival bleeding after dentist visit. Stopped antiplatelets. Restarted on anticoagulation. Follow up with dentist for continued care

## 2020-09-15 NOTE — DISCHARGE NOTE PROVIDER - NSDCMRMEDTOKEN_GEN_ALL_CORE_FT
apixaban 2.5 mg oral tablet: 1 tab(s) orally 2 times a day  atorvastatin 40 mg oral tablet: 1 tab(s) orally once a day  calcium acetate 667 mg oral tablet: 1 tab(s) orally once a day  Entresto 24 mg-26 mg oral tablet: 1 tab(s) orally 2 times a day  ferrous sulfate 325 mg (65 mg elemental iron) oral tablet: 1 tab(s) orally once a day   folic acid 1 mg oral tablet: 1 tab(s) orally once a day  furosemide 20 mg oral tablet: 1 tab(s) orally Tuesday, Thursday, Saturday, Sunday  latanoprost 0.005% ophthalmic solution: 1 drop(s) to each affected eye once a day (in the evening)  Metoprolol Succinate ER 25 mg oral tablet, extended release: 1 tab(s) orally once a day  pantoprazole 40 mg oral delayed release tablet: 1 tab(s) orally once a day (before a meal)  sertraline 50 mg oral tablet: 1 tab(s) orally once a day

## 2020-09-15 NOTE — DISCHARGE NOTE PROVIDER - PROVIDER TOKENS
PROVIDER:[TOKEN:[[id number]],FOLLOWUP:[1 week]],PROVIDER:[TOKEN:[[id number]],FOLLOWUP:[2 weeks]]

## 2020-09-15 NOTE — PROGRESS NOTE ADULT - SUBJECTIVE AND OBJECTIVE BOX
RACHEL SUMMERS  85y Female    CHIEF COMPLAINT:    Patient is a 85y old  Female who presents with a chief complaint of Chest pain, gingival bleeding (15 Sep 2020 07:53)      INTERVAL HPI/OVERNIGHT EVENTS:    Patient seen and examined. No more bleeding from gums. Feels better.     ROS: All other systems are negative.    Vital Signs:    T(F): 98 (09-15-20 @ 05:06), Max: 98 (09-15-20 @ 05:06)  HR: 63 (09-15-20 @ 05:06) (63 - 69)  BP: 132/66 (09-15-20 @ 05:06) (129/60 - 137/74)  RR: 17 (09-15-20 @ 05:06) (17 - 18)  SpO2: 99% (09-15-20 @ 08:00) (99% - 100%)  I&O's Summary    14 Sep 2020 07:  -  15 Sep 2020 07:00  --------------------------------------------------------  IN: 1260 mL / OUT: 1000 mL / NET: 260 mL    15 Sep 2020 07:01  -  15 Sep 2020 11:00  --------------------------------------------------------  IN: 330 mL / OUT: 200 mL / NET: 130 mL      Daily     Daily Weight in k.9 (15 Sep 2020 05:06)  CAPILLARY BLOOD GLUCOSE          PHYSICAL EXAM:    GENERAL:  NAD  SKIN: No rashes or lesions  HENT: Atraumatic Normocephalic. PERRL. Moist membranes.  NECK: Supple, No JVD. No lymphadenopathy.  PULMONARY: CTA B/L. No wheezing. No rales  CVS: Normal S1, S2. Rate and Rhythm are regular. No murmurs.  ABDOMEN/GI: Soft, Nontender, Nondistended; BS present  EXTREMITIES: Peripheral pulses intact. No edema B/L LE.  NEUROLOGIC:  No motor or sensory deficit.  PSYCH: Alert & oriented x 3    Consultant(s) Notes Reviewed:  [x ] YES  [ ] NO  Care Discussed with Consultants/Other Providers [ x] YES  [ ] NO    EKG reviewed  Telemetry reviewed    LABS:                        9.5    8.73  )-----------( 302      ( 15 Sep 2020 06:05 )             31.3   Hemoglobin: 9.5 g/dL (09-15 @ 06:05)  Hemoglobin: 9.2 g/dL ( @ 06:11)  Hemoglobin: 9.3 g/dL ( @ 16:18)  Hemoglobin: 9.3 g/dL ( @ 22:25)    09-15    139  |  106  |  67<HH>  ----------------------------<  91   Creatinine Trend: 2.8<--, 2.7<--, 2.7<--, 2.9<--  5.3<H>   |  23  |  2.8<H>    Ca    8.9      15 Sep 2020 06:05    TPro  6.5  /  Alb  3.6  /  TBili  <0.2  /  DBili  x   /  AST  26  /  ALT  14  /  AlkPhos  118<H>  09-15      Serum Pro-Brain Natriuretic Peptide: 4382 pg/mL (20 @ 22:25)    Trop <0.01, CKMB --, CK --, 20 @ 06:11  Trop <0.01, CKMB --, CK --, 20 @ 22:25        RADIOLOGY & ADDITIONAL TESTS:      Imaging or report Personally Reviewed:  [ ] YES  [ ] NO    Medications:  Standing  apixaban 2.5 milliGRAM(s) Oral two times a day  atorvastatin 40 milliGRAM(s) Oral at bedtime  calcium acetate 667 milliGRAM(s) Oral three times a day with meals  ferrous    sulfate 325 milliGRAM(s) Oral daily  folic acid 1 milliGRAM(s) Oral daily  furosemide    Tablet 20 milliGRAM(s) Oral daily  latanoprost 0.005% Ophthalmic Solution 1 Drop(s) Both EYES at bedtime  metoprolol succinate ER 25 milliGRAM(s) Oral daily  pantoprazole    Tablet 40 milliGRAM(s) Oral before breakfast  sertraline 50 milliGRAM(s) Oral daily    PRN Meds      Case discussed with resident    Care discussed with pt/family

## 2020-09-15 NOTE — DISCHARGE NOTE PROVIDER - HOSPITAL COURSE
HPI:  84 year old female patient presenting to [facility] for assessment of gingival bleed and two episodes of chest pain.    Known non-ischemic cardiomyopathy [6/21/20 - LVEF 20-25%, GIIDD, PASP 63mmHg] [6/22/20 - normal adenosine stress / rest myocardial perfusion scan], CRT-d Medtronic deployed in 2004 (Dr. Baez)  Known history of chronic left popliteal DVT, history of PE, CKD III baseline 2-2.2, dyslipidemic.   Recent admission to [facility] for bright red blood streaked BMs, followed as an outpatient with capsule endoscopy with Dr. Tierney.    Current history goes back to 9/7 when the patient had orthodontic surgery removing several teeth in the mandible. Since then, the patient has been complaining of gingival bleed. Patient seeked medical attention at [facility] on 9/8 and 9/9 for which she was discharged on "Prickly Jose" and was kept on her plavix and apixaban.   Patient has been having daily episodes of gingival bleeding since then at varied times of the day.  On 9/12, patient during the day feels blood in her mouth and then moments after, experiences left sided chest discomfort, pressure like in quality, 3/10 in intensity, radiating to her left subscapular region, associated with mild dyspnea and anxiety. No diaphoresis, no palpitations, no dizziness, no N/V. Patient's pain self resolved after a few minutes.     Hospital course:  84F with PMH of non-ischemic cardiomyopathy (EF 20-25% in 6/2020) s/p CRT-D, severe pulmonary HTN, CKD III-IV, DLD, history of chronic DVT and PE on Eliquis, RA presented to the ED for chest pain and gingival bleed    # Gingival bleed  #chronic anemia  No acute anemia but patient has chronic anemia  - Taking Eliquis, ASA and Plavix at home. All 3 held during this hospitalization  - stopped plavix, ASA  - restarted on  eliquis 2.5 BID for age > 2.5, Cr > 1.5  -continued with home iron 325mg qD    # Atypical Chest pain  #palpitations  Recent nuclear stress test 06/2020 negative. Cath in 2014 was normal. Trops -ve at admission. Cardiac cause less likely.   Patient with history of V.tach in the past (as per Dr. Mckinney's outpatient notes). s/p BiV-ICD. No events recorded on Medtronic device by EP on 9/14  - Was monitored on telemetry    #OMERO  #hyperkalemia  Cr increasing to 2.8 on 9/15 (2.1 baseline). K+ increased to 5.3 9/15. Likely due to ACE inhib use (entresto). Per cardiology notes, has been taken off entresto for similar hyperkalemia/renal function in past.   -held Entresto  -follow up ACE inhibitor use with outpt cardiologist (Dr. Mckinney)    # Non-ischemic cardiomyopathy (EF 20-25% in 6/2020)  s/p BiV-ICD, CRT-D. CXR showed b/l pulmonary vascular congestion. Given 1 dose 40 mg IV lasix today as per cardio recs. Patient on 40mg of PO lasix 3 days/week and 20mg 2 days per week.  -was on lasix 20mg PO qD  - Continued home dose Toprol  - Entresto held.   -follow up ACE inhibitor use, lasix regimen with outpt cardiologist (Dr. Mckinney)    # Hyperlipidemia  - Continued with atorvastatin 40mg qd    # History of PE/DVT  Hold apixaban given bleed. Restart after gingival bleeding stops or 3d whichever comes later  -on eliquis 2.5 BID    # Glaucoma  - Continue eye drops - latanaprost 0.005% solution    # CKD III  - Creatinine at baseline   -Cr 2.8 on 9/15  -hold Entresto    Dispo: stable for discharge Home       Follow-up:  -follow up with cardiologist (Dr. Mckinney) in 1-2 weeks  -maintain low K+ diet HPI:  84 year old female patient presenting to [facility] for assessment of gingival bleed and two episodes of chest pain.    Known non-ischemic cardiomyopathy [6/21/20 - LVEF 20-25%, GIIDD, PASP 63mmHg] [6/22/20 - normal adenosine stress / rest myocardial perfusion scan], CRT-d Medtronic deployed in 2004 (Dr. Baez)  Known history of chronic left popliteal DVT, history of PE, CKD III baseline 2-2.2, dyslipidemic.   Recent admission to [facility] for bright red blood streaked BMs, followed as an outpatient with capsule endoscopy with Dr. Tierney.    Current history goes back to 9/7 when the patient had orthodontic surgery removing several teeth in the mandible. Since then, the patient has been complaining of gingival bleed. Patient seeked medical attention at [facility] on 9/8 and 9/9 for which she was discharged on "Prickly Jose" and was kept on her plavix and apixaban.   Patient has been having daily episodes of gingival bleeding since then at varied times of the day.  On 9/12, patient during the day feels blood in her mouth and then moments after, experiences left sided chest discomfort, pressure like in quality, 3/10 in intensity, radiating to her left subscapular region, associated with mild dyspnea and anxiety. No diaphoresis, no palpitations, no dizziness, no N/V. Patient's pain self resolved after a few minutes.     Hospital course:  84F with PMH of non-ischemic cardiomyopathy (EF 20-25% in 6/2020) s/p CRT-D, severe pulmonary HTN, CKD III-IV, DLD, history of chronic DVT and PE on Eliquis, RA presented to the ED for chest pain and gingival bleed    # Gingival bleed  #chronic anemia  No acute anemia but patient has chronic anemia  - Taking Eliquis, ASA and Plavix at home. All 3 held during this hospitalization  - stopped plavix, ASA  - restarted on  eliquis 2.5 BID for age > 2.5, Cr > 1.5  -continued with home iron 325mg qD    # Atypical Chest pain  #palpitations  Recent nuclear stress test 06/2020 negative. Cath in 2014 was normal. Trops -ve at admission. Cardiac cause less likely.   Patient with history of V.tach in the past (as per Dr. Mckinney's outpatient notes). s/p BiV-ICD. No events recorded on Medtronic device by EP on 9/14  - Was monitored on telemetry    #OMERO  #hyperkalemia  Cr increasing to 2.8 on 9/15 (2.1 baseline). K+ increased to 5.3 9/15. Likely due to ACE inhib use (entresto). Per cardiology notes, has been taken off entresto for similar hyperkalemia/renal function in past.   -continue Entresto, low K+ diet  -follow up ACE inhibitor use with outpt cardiologist (Dr. Mckinney)    # Non-ischemic cardiomyopathy (EF 20-25% in 6/2020)  s/p BiV-ICD, CRT-D. CXR showed b/l pulmonary vascular congestion. Given 1 dose 40 mg IV lasix today as per cardio recs. Patient on 40mg of PO lasix 3 days/week and 20mg 2 days per week.  -was on lasix 20mg PO qD  - Continued home dose Toprol  -follow up ACE inhibitor use, lasix regimen with outpt cardiologist (Dr. Mckinney)    # Hyperlipidemia  - Continued with atorvastatin 40mg qd    # History of PE/DVT  Hold apixaban given bleed. Restart after gingival bleeding stops or 3d whichever comes later  -on eliquis 2.5 BID    # Glaucoma  - Continue eye drops - latanaprost 0.005% solution    # CKD III  - Creatinine at baseline   -Cr 2.8 on 9/15    Dispo: stable for discharge Home       Follow-up:  -follow up with cardiologist (Dr. Mckinney) in 1-2 weeks  -maintain low K+ diet

## 2020-09-15 NOTE — PROGRESS NOTE ADULT - ASSESSMENT
84F with PMH of non-ischemic cardiomyopathy (EF 20-25% in 6/2020) s/p CRT-D, severe pulmonary HTN, CKD III-IV, DLD, history of chronic DVT and PE on Eliquis, RA presented to the ED for chest pain and gingival bleed    # Gingival bleed  #chronic anemia  No acute anemia but patient has chronic anemia  - f/u dental evaluation of gingival bleeding  - Taking Eliquis, ASA and Plavix at home. All 3 held during this hospitalization  - d/c plavix, ASA  - c/w eliquis 2.5 BID for age > 2.5, Cr > 1.5  -c/w iron 325mg qD  - Pt is a Jainism and refuses blood transfusion even in life-threatening conditions    # Atypical Chest pain  Recent nuclear stress test 06/2020 negative. Cath in 2014 was normal. Trops -ve at admission. Cardiac cause less likely  - Continue telemetry for now    # Palpitations  Patient with history of V.tach in the past (as per Dr. Mckinney's outpatient notes). s/p BiV-ICD. No events recorded on Medtronic device by EP on 9/14  - Continue telemetry    # Non-ischemic cardiomyopathy (EF 20-25% in 6/2020)  s/p BiV-ICD, CRT-D. CXR showed b/l pulmonary vascular congestion. Given 1 dose 40 mg IV lasix today as per cardio recs  - Patient on 40mg of PO lasix 3 days/week and 20mg 2 days per week.  - Continue home dose lasix, Toprol  - Continue Entresto. Monitor labs for hyperkalemia    # Hyperlipidemia  - Continue atorvastatin 40mg qd    # History of PE/DVT  Hold apixaban given bleed. Restart after gingival bleeding stops or 3d whichever comes later  -c/w eliquis 2.5 bid     # Glaucoma  - Continue eye drops - latanaprost 0.005% solution    # CKD III  - Creatinine at baseline   -Cr ___ 9/14    DVT ppx: on eliquis  GI ppx: pantoprazole 40mg qD  Labs: CMP+Mg, CBC  Diet: DASH/TLC  Activity: Ambulate as tolerated  Dispo: Home pending clinical improvement  FULL CODE, Jainism   84F with PMH of non-ischemic cardiomyopathy (EF 20-25% in 6/2020) s/p CRT-D, severe pulmonary HTN, CKD III-IV, DLD, history of chronic DVT and PE on Eliquis, RA presented to the ED for chest pain and gingival bleed    # Gingival bleed  #chronic anemia  No acute anemia but patient has chronic anemia  - Taking Eliquis, ASA and Plavix at home. All 3 held during this hospitalization  - d/c plavix, ASA  - c/w eliquis 2.5 BID for age > 2.5, Cr > 1.5  -c/w iron 325mg qD  - Pt is a Confucianist and refuses blood transfusion even in life-threatening conditions    # Atypical Chest pain  Recent nuclear stress test 06/2020 negative. Cath in 2014 was normal. Trops -ve at admission. Cardiac cause less likely  - Continue telemetry for now    # Palpitations  Patient with history of V.tach in the past (as per Dr. Mckinney's outpatient notes). s/p BiV-ICD. No events recorded on [employer] device by EP on 9/14  - Continue telemetry    #OMERO  #hyperkalemia  Cr increasing to 2.8 9/15 (2.1 baseline). K+ increased to 5.3 9/15. Likely due to ACE inhib use (entresto). Per cardiology notes, has been taken off entresto for similar hyperkalemia/renal function in past.   -hold Entresto      # Non-ischemic cardiomyopathy (EF 20-25% in 6/2020)  s/p BiV-ICD, CRT-D. CXR showed b/l pulmonary vascular congestion. Given 1 dose 40 mg IV lasix today as per cardio recs. Patient on 40mg of PO lasix 3 days/week and 20mg 2 days per week.  -c/w lasix 20mg PO qD  - Continue home dose Toprol  - Entresto held. Monitor labs for hyperkalemia    # Hyperlipidemia  - Continue atorvastatin 40mg qd    # History of PE/DVT  Hold apixaban given bleed. Restart after gingival bleeding stops or 3d whichever comes later  -c/w eliquis 2.5 BID    # Glaucoma  - Continue eye drops - latanaprost 0.005% solution    # CKD III  - Creatinine at baseline   -Cr 2.8 on 9/15  -hold Entresto    DVT ppx: on eliquis  GI ppx: pantoprazole 40mg qD  Labs: CMP+Mg, CBC  Diet: DASH/TLC  Activity: Ambulate as tolerated  Dispo: Home pending clinical improvement  FULL CODE, Confucianist

## 2020-09-15 NOTE — DISCHARGE NOTE PROVIDER - CARE PROVIDERS DIRECT ADDRESSES
[email or web address]

## 2020-09-15 NOTE — PROGRESS NOTE ADULT - SUBJECTIVE AND OBJECTIVE BOX
SUBJECTIVE:    Patient is a 85y old Female who presents with a chief complaint of Chest pain, gingival bleeding (14 Sep 2020 12:43)    Currently admitted to medicine with the primary diagnosis of Chest pain       Today is hospital day 2d. This morning she is resting comfortably in bed and reports no new issues or overnight events. Bleeding improved, though can taste some blood this AM. Has some lightheadedness early in the morning. No fevers, chills. Denies CP or SOB.      PAST MEDICAL & SURGICAL HISTORY  DVT, lower extremity    Rheumatoid arthritis    Diastolic heart failure    Diverticulitis  sigmoid    Gout    Hypertension    Pacemaker    Chronic kidney disease    History of hysterectomy    History of tonsillectomy    History of appendectomy    Artificial pacemaker      SOCIAL HISTORY:  Negative for smoking/alcohol/drug use.     ALLERGIES:  Keflex (Swelling)    MEDICATIONS:  STANDING MEDICATIONS  apixaban 2.5 milliGRAM(s) Oral two times a day  atorvastatin 40 milliGRAM(s) Oral at bedtime  calcium acetate 667 milliGRAM(s) Oral three times a day with meals  ferrous    sulfate 325 milliGRAM(s) Oral daily  folic acid 1 milliGRAM(s) Oral daily  furosemide    Tablet 20 milliGRAM(s) Oral daily  latanoprost 0.005% Ophthalmic Solution 1 Drop(s) Both EYES at bedtime  metoprolol succinate ER 25 milliGRAM(s) Oral daily  pantoprazole    Tablet 40 milliGRAM(s) Oral before breakfast  sacubitril 24 mG/valsartan 26 mG 1 Tablet(s) Oral two times a day  sertraline 50 milliGRAM(s) Oral daily    PRN MEDICATIONS    VITALS:   T(F): 98  HR: 63  BP: 132/66  RR: 17  SpO2: 100%    LABS:                        9.5    8.73  )-----------( 302      ( 15 Sep 2020 06:05 )             31.3     09-14    139  |  103  |  65<HH>  ----------------------------<  119<H>  4.9   |  22  |  2.7<H>    Ca    8.8      14 Sep 2020 06:11    TPro  6.2  /  Alb  3.4<L>  /  TBili  <0.2  /  DBili  x   /  AST  26  /  ALT  14  /  AlkPhos  111  09-14              CARDIAC MARKERS ( 14 Sep 2020 06:11 )  x     / <0.01 ng/mL / x     / x     / x        PHYSICAL EXAM:    GEN: NAD, lying bed comfortably  HEENT: EOMI, PERRLA, conjunctiva and sclera clear. Dried blood over lower gingiva, no active bleeding noted.   LUNGS: Clear to auscultation bilaterally. No rales, rhonchi, or wheezing.  HEART: S1/S2 present. RRR.   ABD: Soft, non-tender, non-distended. Bowel sounds present.  EXT: 2+ peripheral pulses. No clubbing, cyanosis, or edema.   NEURO: AAOX3. Speech clear. No focal neurological deficits. Sensation grossly intact.   Skin: No rashes or lesions.

## 2020-09-15 NOTE — DISCHARGE NOTE PROVIDER - CARE PROVIDER_API CALL
Sterling Mckinney)  Cardiovascular Disease; Internal Medicine  [address] [place] [address], [place] [address]  Phone: [phone]  Fax: [phone]  Follow Up Time: 1 week    Wade Celeste  INTERNAL MEDICINE  [address] [place] [address]  Phone: [phone]  Fax: [phone]  Follow Up Time: 2 weeks

## 2020-09-15 NOTE — PROGRESS NOTE ADULT - ASSESSMENT
84 year old female patient presenting to ED for assessment of gingival bleed and two episodes of chest pain.  Known non-ischemic cardiomyopathy [6/21/20 - LVEF 20-25%, GIIDD, PASP 63mmHg] [6/22/20 - normal adenosine stress / rest myocardial perfusion scan    Gingival bleeding  CP likely musculoskeletal  Non Ischemic CM (EF 20-25%)  Ch. HFrEF  H/O Chronic DVT & PE  OMERO on CKD-IV  Hyperkalemia          PLAN:    No more gingival bleeding. H/H remains stable  Called pt's cardiologist Dr. Mckinney and discussed pt care with him. As per cardiologist pt had cardiac cath done in the past and had non obstructive CAD. No need for DAPT. Will D/C ASA and Plavix, but will cont Eliquis due to H/O recurrent chronic DVT & PE  Cont her other home meds.   CE x 2 negative  D/C tele  Restarted Eliquis 2.5 mg po q 12h last night. No more bleeding gums  Cr is stable  Low K diet.     * Med rec reviewed. Plan of care D/W the pt. Time spent 35 minutes.

## 2020-09-18 DIAGNOSIS — E78.5 HYPERLIPIDEMIA, UNSPECIFIED: ICD-10-CM

## 2020-09-18 DIAGNOSIS — M06.9 RHEUMATOID ARTHRITIS, UNSPECIFIED: ICD-10-CM

## 2020-09-18 DIAGNOSIS — I42.8 OTHER CARDIOMYOPATHIES: ICD-10-CM

## 2020-09-18 DIAGNOSIS — Z90.710 ACQUIRED ABSENCE OF BOTH CERVIX AND UTERUS: ICD-10-CM

## 2020-09-18 DIAGNOSIS — F41.1 GENERALIZED ANXIETY DISORDER: ICD-10-CM

## 2020-09-18 DIAGNOSIS — K06.8 OTHER SPECIFIED DISORDERS OF GINGIVA AND EDENTULOUS ALVEOLAR RIDGE: ICD-10-CM

## 2020-09-18 DIAGNOSIS — I13.0 HYPERTENSIVE HEART AND CHRONIC KIDNEY DISEASE WITH HEART FAILURE AND STAGE 1 THROUGH STAGE 4 CHRONIC KIDNEY DISEASE, OR UNSPECIFIED CHRONIC KIDNEY DISEASE: ICD-10-CM

## 2020-09-18 DIAGNOSIS — I27.20 PULMONARY HYPERTENSION, UNSPECIFIED: ICD-10-CM

## 2020-09-18 DIAGNOSIS — R00.2 PALPITATIONS: ICD-10-CM

## 2020-09-18 DIAGNOSIS — R07.89 OTHER CHEST PAIN: ICD-10-CM

## 2020-09-18 DIAGNOSIS — E87.5 HYPERKALEMIA: ICD-10-CM

## 2020-09-18 DIAGNOSIS — H40.9 UNSPECIFIED GLAUCOMA: ICD-10-CM

## 2020-09-18 DIAGNOSIS — N17.9 ACUTE KIDNEY FAILURE, UNSPECIFIED: ICD-10-CM

## 2020-09-18 DIAGNOSIS — N18.3 CHRONIC KIDNEY DISEASE, STAGE 3 (MODERATE): ICD-10-CM

## 2020-09-18 DIAGNOSIS — Z98.890 OTHER SPECIFIED POSTPROCEDURAL STATES: ICD-10-CM

## 2020-09-18 DIAGNOSIS — Z86.718 PERSONAL HISTORY OF OTHER VENOUS THROMBOSIS AND EMBOLISM: ICD-10-CM

## 2020-09-18 DIAGNOSIS — Z90.49 ACQUIRED ABSENCE OF OTHER SPECIFIED PARTS OF DIGESTIVE TRACT: ICD-10-CM

## 2020-09-18 DIAGNOSIS — I82.532 CHRONIC EMBOLISM AND THROMBOSIS OF LEFT POPLITEAL VEIN: ICD-10-CM

## 2020-09-18 DIAGNOSIS — Z95.810 PRESENCE OF AUTOMATIC (IMPLANTABLE) CARDIAC DEFIBRILLATOR: ICD-10-CM

## 2020-09-18 DIAGNOSIS — K91.840 POSTPROCEDURAL HEMORRHAGE OF A DIGESTIVE SYSTEM ORGAN OR STRUCTURE FOLLOWING A DIGESTIVE SYSTEM PROCEDURE: ICD-10-CM

## 2020-09-18 DIAGNOSIS — Z86.711 PERSONAL HISTORY OF PULMONARY EMBOLISM: ICD-10-CM

## 2020-09-18 DIAGNOSIS — M10.9 GOUT, UNSPECIFIED: ICD-10-CM

## 2020-09-18 DIAGNOSIS — I25.2 OLD MYOCARDIAL INFARCTION: ICD-10-CM

## 2020-09-18 DIAGNOSIS — Z79.01 LONG TERM (CURRENT) USE OF ANTICOAGULANTS: ICD-10-CM

## 2020-09-18 DIAGNOSIS — D50.9 IRON DEFICIENCY ANEMIA, UNSPECIFIED: ICD-10-CM

## 2020-09-18 DIAGNOSIS — R06.00 DYSPNEA, UNSPECIFIED: ICD-10-CM

## 2020-09-18 DIAGNOSIS — I50.32 CHRONIC DIASTOLIC (CONGESTIVE) HEART FAILURE: ICD-10-CM

## 2020-09-18 DIAGNOSIS — R26.89 OTHER ABNORMALITIES OF GAIT AND MOBILITY: ICD-10-CM

## 2020-10-16 ENCOUNTER — APPOINTMENT (OUTPATIENT)
Dept: CARDIOLOGY | Facility: CLINIC | Age: 85
End: 2020-10-16
Payer: MEDICARE

## 2020-10-16 VITALS — TEMPERATURE: 97.3 F | WEIGHT: 145 LBS | BODY MASS INDEX: 25.69 KG/M2 | HEART RATE: 59 BPM | HEIGHT: 63 IN

## 2020-10-16 PROCEDURE — 93000 ELECTROCARDIOGRAM COMPLETE: CPT

## 2020-10-16 PROCEDURE — 99214 OFFICE O/P EST MOD 30 MIN: CPT

## 2020-10-16 RX ORDER — CLOPIDOGREL BISULFATE 75 MG/1
75 TABLET, FILM COATED ORAL
Qty: 30 | Refills: 0 | Status: DISCONTINUED | COMMUNITY
Start: 2019-07-16 | End: 2020-10-16

## 2020-10-16 NOTE — REASON FOR VISIT
[Follow-Up - Clinic] : a clinic follow-up of [Cardiomyopathy] : cardiomyopathy [Chest Pain] : chest pain [Hyperlipidemia] : hyperlipidemia [Hypertension] : hypertension [Discharge Date: ___] : Discharge Date: [unfilled] [Follow-Up: _____] : a [unfilled] follow-up visit [Formal Caregiver] : formal caregiver [Admitted for Heart Failure] : patient was not admitted for heart failure

## 2020-10-16 NOTE — ASSESSMENT
[FreeTextEntry1] : The patient has had bleeding from teeth which were extracted . The patient had her Plvix and AS stopped and eliquis was continued . She has been doing well without HF symptoms . She is on GDMT for her HR and is improving

## 2020-10-16 NOTE — CONSULT LETTER
[Courtesy Letter:] : I had the pleasure of seeing your patient, [unfilled], in my office today. [Please see my note below.] : Please see my note below. [Dear  ___] : Dear  [unfilled], [Consult Closing:] : Thank you very much for allowing me to participate in the care of this patient.  If you have any questions, please do not hesitate to contact me. [Sincerely,] : Sincerely, [FreeTextEntry2] : Dr Sterling Mckinney\par Dr Wade Tao

## 2020-10-16 NOTE — HISTORY OF PRESENT ILLNESS
[FreeTextEntry1] : The patient has had teeth pulled and had excessive bleeding afterwars. She had been taking ASA and Eliquis in the past and at one time Plavix was also given . She ended up at [facility] via [facility] and the patient had ASA and Plavix held and she is only been treated with DOAC . The patient has had no chest pain . SOB has been good. She has a known nonischemic CM .

## 2020-10-16 NOTE — PHYSICAL EXAM
[General Appearance - Well Developed] : well developed [General Appearance - Well Nourished] : well nourished [Normal Appearance] : normal appearance [Well Groomed] : well groomed [No Deformities] : no deformities [Normal Conjunctiva] : the conjunctiva exhibited no abnormalities [General Appearance - In No Acute Distress] : no acute distress [Eyelids - No Xanthelasma] : the eyelids demonstrated no xanthelasmas [Normal Oral Mucosa] : normal oral mucosa [No Oral Pallor] : no oral pallor [Normal Jugular Venous A Waves Present] : normal jugular venous A waves present [No Oral Cyanosis] : no oral cyanosis [Normal Jugular Venous V Waves Present] : normal jugular venous V waves present [No Jugular Venous Baez A Waves] : no jugular venous baez A waves [Respiration, Rhythm And Depth] : normal respiratory rhythm and effort [Exaggerated Use Of Accessory Muscles For Inspiration] : no accessory muscle use [Auscultation Breath Sounds / Voice Sounds] : lungs were clear to auscultation bilaterally [Heart Rate And Rhythm] : heart rate and rhythm were normal [Murmurs] : no murmurs present [Heart Sounds] : normal S1 and S2 [Abdomen Mass (___ Cm)] : no abdominal mass palpated [Abdomen Soft] : soft [Bowel Sounds] : normal bowel sounds [Cyanosis, Localized] : no localized cyanosis [Nail Clubbing] : no clubbing of the fingernails [Petechial Hemorrhages (___cm)] : no petechial hemorrhages [] : no ischemic changes [Affect] : the affect was normal [Oriented To Time, Place, And Person] : oriented to person, place, and time [No Anxiety] : not feeling anxious [Normal Rate] : normal [Mood] : the mood was normal [II] : a grade 2 [1+] : left 1+ [No Pitting Edema] : no pitting edema present [FreeTextEntry1] : Epigastric tensderness.

## 2020-12-10 ENCOUNTER — APPOINTMENT (OUTPATIENT)
Dept: VASCULAR SURGERY | Facility: CLINIC | Age: 85
End: 2020-12-10
Payer: MEDICARE

## 2020-12-10 VITALS
DIASTOLIC BLOOD PRESSURE: 88 MMHG | WEIGHT: 140.6 LBS | SYSTOLIC BLOOD PRESSURE: 170 MMHG | TEMPERATURE: 97.8 F | BODY MASS INDEX: 24.91 KG/M2

## 2020-12-10 PROCEDURE — 99072 ADDL SUPL MATRL&STAF TM PHE: CPT

## 2020-12-10 PROCEDURE — 99213 OFFICE O/P EST LOW 20 MIN: CPT

## 2020-12-10 PROCEDURE — 93880 EXTRACRANIAL BILAT STUDY: CPT

## 2020-12-10 NOTE — ASSESSMENT
[FreeTextEntry1] : 84 y/o female with asymptomatic carotid stenosis, duplex today revealed patent ICA bilaterally.\par \par No surgical intervention is needed and I will see her back in six months for repeat carotid duplex.

## 2020-12-10 NOTE — DATA REVIEWED
[FreeTextEntry1] : I performed a carotid duplex which was medically necessary to evaluate for carotid stenosis. It showed <50% bilateral ICA stenosis.\par

## 2020-12-10 NOTE — HISTORY OF PRESENT ILLNESS
[FreeTextEntry1] : Ms. Marrufo is an 85 year-old female with carotid stenosis sent in for evaluation, c/o difficulty swallowing and sometimes choking.

## 2020-12-14 ENCOUNTER — INPATIENT (INPATIENT)
Facility: HOSPITAL | Age: 85
LOS: 2 days | Discharge: HOME HEALTH | End: 2020-12-17
Attending: INTERNAL MEDICINE | Admitting: INTERNAL MEDICINE
Payer: MEDICARE

## 2020-12-14 VITALS
DIASTOLIC BLOOD PRESSURE: 93 MMHG | SYSTOLIC BLOOD PRESSURE: 176 MMHG | TEMPERATURE: 98 F | HEIGHT: 63 IN | OXYGEN SATURATION: 100 % | HEART RATE: 91 BPM | RESPIRATION RATE: 19 BRPM | WEIGHT: 119.93 LBS

## 2020-12-14 DIAGNOSIS — Z86.718 PERSONAL HISTORY OF OTHER VENOUS THROMBOSIS AND EMBOLISM: ICD-10-CM

## 2020-12-14 DIAGNOSIS — I42.8 OTHER CARDIOMYOPATHIES: ICD-10-CM

## 2020-12-14 DIAGNOSIS — E78.5 HYPERLIPIDEMIA, UNSPECIFIED: ICD-10-CM

## 2020-12-14 DIAGNOSIS — Z90.49 ACQUIRED ABSENCE OF OTHER SPECIFIED PARTS OF DIGESTIVE TRACT: Chronic | ICD-10-CM

## 2020-12-14 DIAGNOSIS — N17.9 ACUTE KIDNEY FAILURE, UNSPECIFIED: ICD-10-CM

## 2020-12-14 DIAGNOSIS — E87.5 HYPERKALEMIA: ICD-10-CM

## 2020-12-14 DIAGNOSIS — I13.0 HYPERTENSIVE HEART AND CHRONIC KIDNEY DISEASE WITH HEART FAILURE AND STAGE 1 THROUGH STAGE 4 CHRONIC KIDNEY DISEASE, OR UNSPECIFIED CHRONIC KIDNEY DISEASE: ICD-10-CM

## 2020-12-14 DIAGNOSIS — I27.20 PULMONARY HYPERTENSION, UNSPECIFIED: ICD-10-CM

## 2020-12-14 DIAGNOSIS — D64.9 ANEMIA, UNSPECIFIED: ICD-10-CM

## 2020-12-14 DIAGNOSIS — R06.02 SHORTNESS OF BREATH: ICD-10-CM

## 2020-12-14 DIAGNOSIS — M06.9 RHEUMATOID ARTHRITIS, UNSPECIFIED: ICD-10-CM

## 2020-12-14 DIAGNOSIS — Z90.710 ACQUIRED ABSENCE OF BOTH CERVIX AND UTERUS: ICD-10-CM

## 2020-12-14 DIAGNOSIS — Z90.710 ACQUIRED ABSENCE OF BOTH CERVIX AND UTERUS: Chronic | ICD-10-CM

## 2020-12-14 DIAGNOSIS — I50.33 ACUTE ON CHRONIC DIASTOLIC (CONGESTIVE) HEART FAILURE: ICD-10-CM

## 2020-12-14 DIAGNOSIS — M79.662 PAIN IN LEFT LOWER LEG: ICD-10-CM

## 2020-12-14 DIAGNOSIS — Z95.810 PRESENCE OF AUTOMATIC (IMPLANTABLE) CARDIAC DEFIBRILLATOR: ICD-10-CM

## 2020-12-14 DIAGNOSIS — Z79.01 LONG TERM (CURRENT) USE OF ANTICOAGULANTS: ICD-10-CM

## 2020-12-14 DIAGNOSIS — J98.11 ATELECTASIS: ICD-10-CM

## 2020-12-14 DIAGNOSIS — Z86.711 PERSONAL HISTORY OF PULMONARY EMBOLISM: ICD-10-CM

## 2020-12-14 DIAGNOSIS — I34.0 NONRHEUMATIC MITRAL (VALVE) INSUFFICIENCY: ICD-10-CM

## 2020-12-14 DIAGNOSIS — Z95.0 PRESENCE OF CARDIAC PACEMAKER: Chronic | ICD-10-CM

## 2020-12-14 DIAGNOSIS — M10.9 GOUT, UNSPECIFIED: ICD-10-CM

## 2020-12-14 DIAGNOSIS — Z90.89 ACQUIRED ABSENCE OF OTHER ORGANS: Chronic | ICD-10-CM

## 2020-12-14 DIAGNOSIS — M19.90 UNSPECIFIED OSTEOARTHRITIS, UNSPECIFIED SITE: ICD-10-CM

## 2020-12-14 DIAGNOSIS — H40.9 UNSPECIFIED GLAUCOMA: ICD-10-CM

## 2020-12-14 DIAGNOSIS — N18.4 CHRONIC KIDNEY DISEASE, STAGE 4 (SEVERE): ICD-10-CM

## 2020-12-14 LAB
ALBUMIN SERPL ELPH-MCNC: 4.2 G/DL — SIGNIFICANT CHANGE UP (ref 3.5–5.2)
ALP SERPL-CCNC: 111 U/L — SIGNIFICANT CHANGE UP (ref 30–115)
ALT FLD-CCNC: 19 U/L — SIGNIFICANT CHANGE UP (ref 0–41)
ANION GAP SERPL CALC-SCNC: 12 MMOL/L — SIGNIFICANT CHANGE UP (ref 7–14)
APTT BLD: 36.3 SEC — SIGNIFICANT CHANGE UP (ref 27–39.2)
AST SERPL-CCNC: 40 U/L — SIGNIFICANT CHANGE UP (ref 0–41)
BASOPHILS # BLD AUTO: 0.03 K/UL — SIGNIFICANT CHANGE UP (ref 0–0.2)
BASOPHILS NFR BLD AUTO: 0.4 % — SIGNIFICANT CHANGE UP (ref 0–1)
BILIRUB SERPL-MCNC: 0.2 MG/DL — SIGNIFICANT CHANGE UP (ref 0.2–1.2)
BUN SERPL-MCNC: 52 MG/DL — HIGH (ref 10–20)
CALCIUM SERPL-MCNC: 9.8 MG/DL — SIGNIFICANT CHANGE UP (ref 8.5–10.1)
CHLORIDE SERPL-SCNC: 104 MMOL/L — SIGNIFICANT CHANGE UP (ref 98–110)
CO2 SERPL-SCNC: 22 MMOL/L — SIGNIFICANT CHANGE UP (ref 17–32)
CREAT SERPL-MCNC: 3 MG/DL — HIGH (ref 0.7–1.5)
EOSINOPHIL # BLD AUTO: 0.57 K/UL — SIGNIFICANT CHANGE UP (ref 0–0.7)
EOSINOPHIL NFR BLD AUTO: 8.1 % — HIGH (ref 0–8)
GLUCOSE SERPL-MCNC: 92 MG/DL — SIGNIFICANT CHANGE UP (ref 70–99)
HCT VFR BLD CALC: 33.3 % — LOW (ref 37–47)
HGB BLD-MCNC: 10.2 G/DL — LOW (ref 12–16)
IMM GRANULOCYTES NFR BLD AUTO: 0.3 % — SIGNIFICANT CHANGE UP (ref 0.1–0.3)
INR BLD: 1.14 RATIO — SIGNIFICANT CHANGE UP (ref 0.65–1.3)
LYMPHOCYTES # BLD AUTO: 2.07 K/UL — SIGNIFICANT CHANGE UP (ref 1.2–3.4)
LYMPHOCYTES # BLD AUTO: 29.3 % — SIGNIFICANT CHANGE UP (ref 20.5–51.1)
MAGNESIUM SERPL-MCNC: 1.8 MG/DL — SIGNIFICANT CHANGE UP (ref 1.8–2.4)
MCHC RBC-ENTMCNC: 28.5 PG — SIGNIFICANT CHANGE UP (ref 27–31)
MCHC RBC-ENTMCNC: 30.6 G/DL — LOW (ref 32–37)
MCV RBC AUTO: 93 FL — SIGNIFICANT CHANGE UP (ref 81–99)
MONOCYTES # BLD AUTO: 0.93 K/UL — HIGH (ref 0.1–0.6)
MONOCYTES NFR BLD AUTO: 13.2 % — HIGH (ref 1.7–9.3)
NEUTROPHILS # BLD AUTO: 3.45 K/UL — SIGNIFICANT CHANGE UP (ref 1.4–6.5)
NEUTROPHILS NFR BLD AUTO: 48.7 % — SIGNIFICANT CHANGE UP (ref 42.2–75.2)
NRBC # BLD: 0 /100 WBCS — SIGNIFICANT CHANGE UP (ref 0–0)
NT-PROBNP SERPL-SCNC: HIGH PG/ML (ref 0–300)
PLATELET # BLD AUTO: 253 K/UL — SIGNIFICANT CHANGE UP (ref 130–400)
POTASSIUM SERPL-MCNC: 5.6 MMOL/L — HIGH (ref 3.5–5)
POTASSIUM SERPL-SCNC: 5.6 MMOL/L — HIGH (ref 3.5–5)
PROT SERPL-MCNC: 7.5 G/DL — SIGNIFICANT CHANGE UP (ref 6–8)
PROTHROM AB SERPL-ACNC: 13.1 SEC — HIGH (ref 9.95–12.87)
RBC # BLD: 3.58 M/UL — LOW (ref 4.2–5.4)
RBC # FLD: 14.1 % — SIGNIFICANT CHANGE UP (ref 11.5–14.5)
SARS-COV-2 RNA SPEC QL NAA+PROBE: SIGNIFICANT CHANGE UP
SODIUM SERPL-SCNC: 138 MMOL/L — SIGNIFICANT CHANGE UP (ref 135–146)
TROPONIN T SERPL-MCNC: <0.01 NG/ML — SIGNIFICANT CHANGE UP
WBC # BLD: 7.07 K/UL — SIGNIFICANT CHANGE UP (ref 4.8–10.8)
WBC # FLD AUTO: 7.07 K/UL — SIGNIFICANT CHANGE UP (ref 4.8–10.8)

## 2020-12-14 PROCEDURE — 71045 X-RAY EXAM CHEST 1 VIEW: CPT | Mod: 26

## 2020-12-14 PROCEDURE — 93970 EXTREMITY STUDY: CPT | Mod: 26

## 2020-12-14 PROCEDURE — 93010 ELECTROCARDIOGRAM REPORT: CPT

## 2020-12-14 PROCEDURE — 71250 CT THORAX DX C-: CPT | Mod: 26

## 2020-12-14 PROCEDURE — 74176 CT ABD & PELVIS W/O CONTRAST: CPT | Mod: 26

## 2020-12-14 PROCEDURE — 99285 EMERGENCY DEPT VISIT HI MDM: CPT

## 2020-12-14 RX ORDER — FUROSEMIDE 40 MG
60 TABLET ORAL ONCE
Refills: 0 | Status: COMPLETED | OUTPATIENT
Start: 2020-12-14 | End: 2020-12-14

## 2020-12-14 RX ORDER — ACETAMINOPHEN 500 MG
975 TABLET ORAL ONCE
Refills: 0 | Status: COMPLETED | OUTPATIENT
Start: 2020-12-14 | End: 2020-12-14

## 2020-12-14 RX ADMIN — Medication 60 MILLIGRAM(S): at 23:04

## 2020-12-14 NOTE — ED PROVIDER NOTE - ATTENDING CONTRIBUTION TO CARE
85 yr old f w/ a pmh significant for DVT (on eliquis), RA, CHF (lasix), gout, htn, CKD, s/p pacemaker who presents today with SOB, flank pain and dysuria. Pt reports that she has been having SOB for a couple of days. Pt states that she has also noticed lower extremity swelling during this time.   Pt also states that during this time she has been having L flank pain with dysuria. Pt denies any fevers, chills, nausea, vomiting or any other complaints.     VITAL SIGNS: I have reviewed nursing notes and confirm.  CONSTITUTIONAL: non-toxic, well appearing  SKIN: no rash, no petechiae.  EYES: PERRL, EOMI, pink conjunctiva, anicteric  ENT: tongue midline, no exudates, MMM  NECK: Supple; no meningismus, no JVD  CARD: RRR, no murmurs, equal radial pulses bilaterally 2+  RESP: CTAB, no respiratory distress  ABD: Soft, non-tender, non-distended, no peritoneal signs, no HSM, L CVA tenderness  EXT: Normal ROM x4. bilateral lower extremity edema . No calves tenderness  NEURO: Alert, oriented. CN2-12 intact, equal strength bilaterally, nl gait.  PSYCH: Cooperative, appropriate.    a/p  85 yr old f that presents with SOB, L flank pain and dysuria  -labs  -ekg  -imaging  -ua  -dispo as per above

## 2020-12-14 NOTE — ED PROVIDER NOTE - NS ED ROS FT
Constitutional: (-) fever (-) malaise (-) diaphoresis (-) chills   Eyes: (-) visual changes (-) eye pain   Cardiac: (-) chest pain  (-) palpitations (-) syncope (-) edema  Respiratory: (-) cough (-) hemoptysis (+) SOB (+) GARCIA  GI: (-) nausea (-) vomiting (-) diarrhea (-) abdominal pain  : (+) dysuria (-) increased frequency  (-) hematuria (-) incontinence  MS: (+) back pain (-) myalgia (-) muscle weakness (-)  joint pain  Neuro: (-) headache (-) dizziness (-) numbness/tingling to extremities B/L (-) weakness   Skin: (-) rash (-) laceration    OB: (-)  (-) C/S (-) complications of delivery. GA:  Except as documented in the HPI, all other systems are negative.

## 2020-12-14 NOTE — ED PROVIDER NOTE - OBJECTIVE STATEMENT
86 yo F pmh CHF, s/p pacemaker, DVT (on Eloquis), RA, gout, HTN, CKD sent in to the ED by PMD Dr. Celeste for evaluation of worsening dyspnea on exertion, Left calf pain x few days. Pt also reports dysuria and left sided flank pain x 2 days. Pt reports walking a few steps causes her to become increasingly short of breath, also exacerbates left calf pain. Pt also noticed some left lower extremity swelling. Pt reports localized, moderate flank pain, worsened by movement. Denies hematuria, fever, chills, paresthesias, nausea, vomiting, headache, dizziness.

## 2020-12-14 NOTE — ED PROVIDER NOTE - CLINICAL SUMMARY MEDICAL DECISION MAKING FREE TEXT BOX
85 yr old f that presents with sob, flank pain and dysuria. labs, ekg, imaging obtained. pt admitted to medicine.

## 2020-12-14 NOTE — ED PROVIDER NOTE - PHYSICAL EXAMINATION
GENERAL: Well-nourished, Well-developed. NAD.  Eyes: PERRLA, EOMI. No asymmetry. No nystagmus. No conjunctival injection. Non-icteric sclera.  Neck: Supple. No LAD. No cervical midline TTP. No paravertebral TTP to traps. FROM  CVS: No JVD. No reproducible chest wall tenderness. Normal S1,S2. No murmurs appreciated on auscultation   RESP: No use of accessory muscles. Chest rise symmetrical with good expansion. Lungs clear to auscultation B/L. No wheezing, rales, or rhonchi auscultated.  GI: Normal auscultation of bowel sounds in all 4 quadrants. Soft, Nontender, Nondistended. No guarding or rebound tenderness. (+)L CVAT  MSK: Extremities w/o deformity or ttp. No visible signs of trauma such as ecchymosis, erythema, or swelling. FROM of upper and lower extremities B/L. No midline spinal TTP. FROM of back with flexion and extension.  Skin: Warm, Dry. No rashes or lesions. Good cap refill < 2 sec B/L.  EXT: Radial and pedal pulses present B/L. No calf tenderness. No palpable cords. (+) pedal edema B/L.  Neuro: AA&O x 3. Speaking in full sentences. Sensation grossly intact. No focal deficits.

## 2020-12-15 LAB
ALBUMIN SERPL ELPH-MCNC: 3.6 G/DL — SIGNIFICANT CHANGE UP (ref 3.5–5.2)
ALP SERPL-CCNC: 102 U/L — SIGNIFICANT CHANGE UP (ref 30–115)
ALT FLD-CCNC: 16 U/L — SIGNIFICANT CHANGE UP (ref 0–41)
ANION GAP SERPL CALC-SCNC: 12 MMOL/L — SIGNIFICANT CHANGE UP (ref 7–14)
APPEARANCE UR: CLEAR — SIGNIFICANT CHANGE UP
AST SERPL-CCNC: 35 U/L — SIGNIFICANT CHANGE UP (ref 0–41)
BASOPHILS # BLD AUTO: 0.04 K/UL — SIGNIFICANT CHANGE UP (ref 0–0.2)
BASOPHILS NFR BLD AUTO: 0.5 % — SIGNIFICANT CHANGE UP (ref 0–1)
BILIRUB SERPL-MCNC: 0.3 MG/DL — SIGNIFICANT CHANGE UP (ref 0.2–1.2)
BILIRUB UR-MCNC: NEGATIVE — SIGNIFICANT CHANGE UP
BUN SERPL-MCNC: 48 MG/DL — HIGH (ref 10–20)
CALCIUM SERPL-MCNC: 8.9 MG/DL — SIGNIFICANT CHANGE UP (ref 8.5–10.1)
CHLORIDE SERPL-SCNC: 102 MMOL/L — SIGNIFICANT CHANGE UP (ref 98–110)
CO2 SERPL-SCNC: 23 MMOL/L — SIGNIFICANT CHANGE UP (ref 17–32)
COLOR SPEC: COLORLESS — SIGNIFICANT CHANGE UP
CREAT SERPL-MCNC: 3 MG/DL — HIGH (ref 0.7–1.5)
DIFF PNL FLD: NEGATIVE — SIGNIFICANT CHANGE UP
EOSINOPHIL # BLD AUTO: 0.63 K/UL — SIGNIFICANT CHANGE UP (ref 0–0.7)
EOSINOPHIL NFR BLD AUTO: 7.6 % — SIGNIFICANT CHANGE UP (ref 0–8)
GLUCOSE SERPL-MCNC: 83 MG/DL — SIGNIFICANT CHANGE UP (ref 70–99)
GLUCOSE UR QL: NEGATIVE — SIGNIFICANT CHANGE UP
HCT VFR BLD CALC: 31.6 % — LOW (ref 37–47)
HGB BLD-MCNC: 9.7 G/DL — LOW (ref 12–16)
IMM GRANULOCYTES NFR BLD AUTO: 0.4 % — HIGH (ref 0.1–0.3)
KETONES UR-MCNC: NEGATIVE — SIGNIFICANT CHANGE UP
LEUKOCYTE ESTERASE UR-ACNC: NEGATIVE — SIGNIFICANT CHANGE UP
LYMPHOCYTES # BLD AUTO: 2.28 K/UL — SIGNIFICANT CHANGE UP (ref 1.2–3.4)
LYMPHOCYTES # BLD AUTO: 27.3 % — SIGNIFICANT CHANGE UP (ref 20.5–51.1)
MAGNESIUM SERPL-MCNC: 1.6 MG/DL — LOW (ref 1.8–2.4)
MCHC RBC-ENTMCNC: 28.2 PG — SIGNIFICANT CHANGE UP (ref 27–31)
MCHC RBC-ENTMCNC: 30.7 G/DL — LOW (ref 32–37)
MCV RBC AUTO: 91.9 FL — SIGNIFICANT CHANGE UP (ref 81–99)
MONOCYTES # BLD AUTO: 1.13 K/UL — HIGH (ref 0.1–0.6)
MONOCYTES NFR BLD AUTO: 13.5 % — HIGH (ref 1.7–9.3)
NEUTROPHILS # BLD AUTO: 4.23 K/UL — SIGNIFICANT CHANGE UP (ref 1.4–6.5)
NEUTROPHILS NFR BLD AUTO: 50.7 % — SIGNIFICANT CHANGE UP (ref 42.2–75.2)
NITRITE UR-MCNC: NEGATIVE — SIGNIFICANT CHANGE UP
NRBC # BLD: 0 /100 WBCS — SIGNIFICANT CHANGE UP (ref 0–0)
PH UR: 7.5 — SIGNIFICANT CHANGE UP (ref 5–8)
PHOSPHATE SERPL-MCNC: 4.9 MG/DL — SIGNIFICANT CHANGE UP (ref 2.1–4.9)
PLATELET # BLD AUTO: 237 K/UL — SIGNIFICANT CHANGE UP (ref 130–400)
POTASSIUM SERPL-MCNC: 5.1 MMOL/L — HIGH (ref 3.5–5)
POTASSIUM SERPL-SCNC: 5.1 MMOL/L — HIGH (ref 3.5–5)
PROT SERPL-MCNC: 6.8 G/DL — SIGNIFICANT CHANGE UP (ref 6–8)
PROT UR-MCNC: ABNORMAL
RBC # BLD: 3.44 M/UL — LOW (ref 4.2–5.4)
RBC # FLD: 14.3 % — SIGNIFICANT CHANGE UP (ref 11.5–14.5)
SODIUM SERPL-SCNC: 137 MMOL/L — SIGNIFICANT CHANGE UP (ref 135–146)
SP GR SPEC: 1.01 — SIGNIFICANT CHANGE UP (ref 1.01–1.03)
TROPONIN T SERPL-MCNC: <0.01 NG/ML — SIGNIFICANT CHANGE UP
TROPONIN T SERPL-MCNC: <0.01 NG/ML — SIGNIFICANT CHANGE UP
UROBILINOGEN FLD QL: SIGNIFICANT CHANGE UP
WBC # BLD: 8.34 K/UL — SIGNIFICANT CHANGE UP (ref 4.8–10.8)
WBC # FLD AUTO: 8.34 K/UL — SIGNIFICANT CHANGE UP (ref 4.8–10.8)

## 2020-12-15 PROCEDURE — 93010 ELECTROCARDIOGRAM REPORT: CPT

## 2020-12-15 PROCEDURE — 93284 PRGRMG EVAL IMPLANTABLE DFB: CPT | Mod: 26

## 2020-12-15 PROCEDURE — 99223 1ST HOSP IP/OBS HIGH 75: CPT | Mod: AI

## 2020-12-15 RX ORDER — HYDRALAZINE HCL 50 MG
25 TABLET ORAL THREE TIMES A DAY
Refills: 0 | Status: DISCONTINUED | OUTPATIENT
Start: 2020-12-15 | End: 2020-12-17

## 2020-12-15 RX ORDER — ACETAMINOPHEN 500 MG
650 TABLET ORAL EVERY 6 HOURS
Refills: 0 | Status: DISCONTINUED | OUTPATIENT
Start: 2020-12-15 | End: 2020-12-17

## 2020-12-15 RX ORDER — SENNA PLUS 8.6 MG/1
1 TABLET ORAL ONCE
Refills: 0 | Status: COMPLETED | OUTPATIENT
Start: 2020-12-15 | End: 2020-12-15

## 2020-12-15 RX ORDER — FUROSEMIDE 40 MG
20 TABLET ORAL DAILY
Refills: 0 | Status: DISCONTINUED | OUTPATIENT
Start: 2020-12-15 | End: 2020-12-17

## 2020-12-15 RX ORDER — CALCIUM ACETATE 667 MG
667 TABLET ORAL
Refills: 0 | Status: DISCONTINUED | OUTPATIENT
Start: 2020-12-15 | End: 2020-12-17

## 2020-12-15 RX ORDER — CHLORHEXIDINE GLUCONATE 213 G/1000ML
1 SOLUTION TOPICAL
Refills: 0 | Status: DISCONTINUED | OUTPATIENT
Start: 2020-12-15 | End: 2020-12-17

## 2020-12-15 RX ORDER — ACETAMINOPHEN 500 MG
650 TABLET ORAL EVERY 6 HOURS
Refills: 0 | Status: DISCONTINUED | OUTPATIENT
Start: 2020-12-15 | End: 2020-12-15

## 2020-12-15 RX ORDER — SACUBITRIL AND VALSARTAN 24; 26 MG/1; MG/1
1 TABLET, FILM COATED ORAL
Refills: 0 | Status: DISCONTINUED | OUTPATIENT
Start: 2020-12-15 | End: 2020-12-15

## 2020-12-15 RX ORDER — METOPROLOL TARTRATE 50 MG
25 TABLET ORAL DAILY
Refills: 0 | Status: DISCONTINUED | OUTPATIENT
Start: 2020-12-15 | End: 2020-12-17

## 2020-12-15 RX ORDER — OXYCODONE AND ACETAMINOPHEN 5; 325 MG/1; MG/1
1 TABLET ORAL ONCE
Refills: 0 | Status: DISCONTINUED | OUTPATIENT
Start: 2020-12-15 | End: 2020-12-15

## 2020-12-15 RX ORDER — FUROSEMIDE 40 MG
20 TABLET ORAL
Refills: 0 | Status: DISCONTINUED | OUTPATIENT
Start: 2020-12-15 | End: 2020-12-15

## 2020-12-15 RX ORDER — METHOCARBAMOL 500 MG/1
750 TABLET, FILM COATED ORAL EVERY 8 HOURS
Refills: 0 | Status: DISCONTINUED | OUTPATIENT
Start: 2020-12-15 | End: 2020-12-17

## 2020-12-15 RX ORDER — LIDOCAINE 4 G/100G
2 CREAM TOPICAL EVERY 24 HOURS
Refills: 0 | Status: DISCONTINUED | OUTPATIENT
Start: 2020-12-15 | End: 2020-12-17

## 2020-12-15 RX ORDER — FOLIC ACID 0.8 MG
1 TABLET ORAL DAILY
Refills: 0 | Status: DISCONTINUED | OUTPATIENT
Start: 2020-12-15 | End: 2020-12-17

## 2020-12-15 RX ORDER — LIDOCAINE 4 G/100G
1 CREAM TOPICAL EVERY 24 HOURS
Refills: 0 | Status: DISCONTINUED | OUTPATIENT
Start: 2020-12-15 | End: 2020-12-15

## 2020-12-15 RX ORDER — LATANOPROST 0.05 MG/ML
1 SOLUTION/ DROPS OPHTHALMIC; TOPICAL AT BEDTIME
Refills: 0 | Status: DISCONTINUED | OUTPATIENT
Start: 2020-12-15 | End: 2020-12-17

## 2020-12-15 RX ORDER — APIXABAN 2.5 MG/1
2.5 TABLET, FILM COATED ORAL EVERY 12 HOURS
Refills: 0 | Status: DISCONTINUED | OUTPATIENT
Start: 2020-12-15 | End: 2020-12-17

## 2020-12-15 RX ORDER — FERROUS SULFATE 325(65) MG
325 TABLET ORAL DAILY
Refills: 0 | Status: DISCONTINUED | OUTPATIENT
Start: 2020-12-15 | End: 2020-12-17

## 2020-12-15 RX ORDER — ATORVASTATIN CALCIUM 80 MG/1
40 TABLET, FILM COATED ORAL DAILY
Refills: 0 | Status: DISCONTINUED | OUTPATIENT
Start: 2020-12-15 | End: 2020-12-17

## 2020-12-15 RX ORDER — SERTRALINE 25 MG/1
50 TABLET, FILM COATED ORAL DAILY
Refills: 0 | Status: DISCONTINUED | OUTPATIENT
Start: 2020-12-15 | End: 2020-12-17

## 2020-12-15 RX ORDER — PANTOPRAZOLE SODIUM 20 MG/1
40 TABLET, DELAYED RELEASE ORAL
Refills: 0 | Status: DISCONTINUED | OUTPATIENT
Start: 2020-12-15 | End: 2020-12-17

## 2020-12-15 RX ADMIN — SENNA PLUS 1 TABLET(S): 8.6 TABLET ORAL at 17:37

## 2020-12-15 RX ADMIN — Medication 667 MILLIGRAM(S): at 16:34

## 2020-12-15 RX ADMIN — Medication 975 MILLIGRAM(S): at 00:19

## 2020-12-15 RX ADMIN — LIDOCAINE 2 PATCH: 4 CREAM TOPICAL at 14:30

## 2020-12-15 RX ADMIN — LIDOCAINE 1 PATCH: 4 CREAM TOPICAL at 23:16

## 2020-12-15 RX ADMIN — Medication 975 MILLIGRAM(S): at 02:30

## 2020-12-15 RX ADMIN — LIDOCAINE 1 PATCH: 4 CREAM TOPICAL at 11:02

## 2020-12-15 RX ADMIN — LIDOCAINE 1 PATCH: 4 CREAM TOPICAL at 20:13

## 2020-12-15 RX ADMIN — Medication 30 MILLILITER(S): at 16:33

## 2020-12-15 RX ADMIN — PANTOPRAZOLE SODIUM 40 MILLIGRAM(S): 20 TABLET, DELAYED RELEASE ORAL at 07:22

## 2020-12-15 RX ADMIN — ATORVASTATIN CALCIUM 40 MILLIGRAM(S): 80 TABLET, FILM COATED ORAL at 11:04

## 2020-12-15 RX ADMIN — APIXABAN 2.5 MILLIGRAM(S): 2.5 TABLET, FILM COATED ORAL at 07:22

## 2020-12-15 RX ADMIN — Medication 25 MILLIGRAM(S): at 21:31

## 2020-12-15 RX ADMIN — Medication 667 MILLIGRAM(S): at 11:05

## 2020-12-15 RX ADMIN — METHOCARBAMOL 750 MILLIGRAM(S): 500 TABLET, FILM COATED ORAL at 21:31

## 2020-12-15 RX ADMIN — METHOCARBAMOL 750 MILLIGRAM(S): 500 TABLET, FILM COATED ORAL at 14:31

## 2020-12-15 RX ADMIN — Medication 20 MILLIGRAM(S): at 13:44

## 2020-12-15 RX ADMIN — Medication 650 MILLIGRAM(S): at 16:34

## 2020-12-15 RX ADMIN — LIDOCAINE 2 PATCH: 4 CREAM TOPICAL at 20:14

## 2020-12-15 RX ADMIN — LATANOPROST 1 DROP(S): 0.05 SOLUTION/ DROPS OPHTHALMIC; TOPICAL at 21:58

## 2020-12-15 RX ADMIN — APIXABAN 2.5 MILLIGRAM(S): 2.5 TABLET, FILM COATED ORAL at 16:34

## 2020-12-15 RX ADMIN — Medication 25 MILLIGRAM(S): at 07:22

## 2020-12-15 RX ADMIN — Medication 667 MILLIGRAM(S): at 21:31

## 2020-12-15 RX ADMIN — SERTRALINE 50 MILLIGRAM(S): 25 TABLET, FILM COATED ORAL at 11:04

## 2020-12-15 RX ADMIN — Medication 1 MILLIGRAM(S): at 11:04

## 2020-12-15 RX ADMIN — Medication 25 MILLIGRAM(S): at 13:45

## 2020-12-15 RX ADMIN — Medication 325 MILLIGRAM(S): at 11:04

## 2020-12-15 NOTE — H&P ADULT - NSHPOUTPATIENTPROVIDERS_GEN_ALL_CORE
Dr. Booker Mckinney PMD: Dr. Celeste; Rheum: Dr VINICIO Jack; EPS: Dr Agustin ([facility])  Cardio: Dr. Mckinney

## 2020-12-15 NOTE — H&P ADULT - NSHPLABSRESULTS_GEN_ALL_CORE
.  LABS:                         10.2   7.07  )-----------( 253      ( 14 Dec 2020 18:20 )             33.3     12-14    138  |  104  |  52<H>  ----------------------------<  92  5.6<H>   |  22  |  3.0<H>    Ca    9.8      14 Dec 2020 18:20  Mg     1.8     12-    TPro  7.5  /  Alb  4.2  /  TBili  0.2  /  DBili  x   /  AST  40  /  ALT  19  /  AlkPhos  111  12-14    PT/INR - ( 14 Dec 2020 18:20 )   PT: 13.10 sec;   INR: 1.14 ratio         PTT - ( 14 Dec 2020 18:20 )  PTT:36.3 sec  Urinalysis Basic - ( 15 Dec 2020 00:00 )    Color: Colorless / Appearance: Clear / S.009 / pH: x  Gluc: x / Ketone: Negative  / Bili: Negative / Urobili: <2 mg/dL   Blood: x / Protein: 30 mg/dL / Nitrite: Negative   Leuk Esterase: Negative / RBC: 1 /HPF / WBC 0 /HPF   Sq Epi: x / Non Sq Epi: 0 /HPF / Bacteria: Negative      < from: CT Abdomen and Pelvis No Cont (20 @ 20:46) >    Bibasilar peripheral opacities likely on the basis of subsegmental atelectasis, infection felt less likely.  No definite noncontrast evidence for acute infectious/inflammatory process within the chest, abdomen or pelvis.          Serum Pro-Brain Natriuretic Peptide: 79081 pg/mL ( @ 18:20)        RADIOLOGY, EKG & ADDITIONAL TESTS: Reviewed.

## 2020-12-15 NOTE — H&P ADULT - NSHPPHYSICALEXAM_GEN_ALL_CORE
PHYSICAL EXAM:  GENERAL: NAD, well-developed  HEAD:  Atraumatic, Normocephalic  EYES: EOMI, PERRLA, conjunctiva and sclera clear  NECK: Supple, No JVD  CHEST/LUNG: Clear to auscultation bilaterally; No wheeze  HEART: Regular rate and rhythm;   ABDOMEN: Soft, tender left flank, Nondistended; Bowel sounds present  EXTREMITIES:  2+ Peripheral Pulses, b/l LE edema  PSYCH: AAOx3  NEUROLOGY: non-focal  SKIN: No rashes or lesions

## 2020-12-15 NOTE — H&P ADULT - NSHPREVIEWOFSYSTEMS_GEN_ALL_CORE
CONSTITUTIONAL: No weakness, fevers or chills, no weight loss   EYES/ENT: No visual changes;  No vertigo or throat pain   NECK: No pain or stiffness  RESPIRATORY: No cough, wheezing, hemoptysis; +shortness of breath  CARDIOVASCULAR: No chest pain or palpitations  GASTROINTESTINAL: No abdominal or epigastric pain. No nausea, vomiting, or hematemesis; No diarrhea or constipation. No melena or hematochezia.  GENITOURINARY: No discharge, hematuria, +dysuria  NEUROLOGICAL: No numbness or weakness, backpain  All other review of systems is negative unless indicated above. CONSTITUTIONAL: No weakness, fevers or chills, no weight loss   EYES/ENT: No visual changes;  No vertigo or throat pain   NECK: No pain or stiffness  RESPIRATORY: No cough, wheezing, hemoptysis; +shortness of breath  CARDIOVASCULAR: No chest pain or palpitations  GASTROINTESTINAL: No abdominal or epigastric pain. No nausea, vomiting, or hematemesis; No diarrhea or constipation. No melena or hematochezia.  GENITOURINARY: No discharge, hematuria, +dysuria  NEUROLOGICAL: No numbness or weakness, back pain  All other review of systems is negative unless indicated above.

## 2020-12-15 NOTE — CONSULT NOTE ADULT - ASSESSMENT
Assessment:  Non-ischemic cardiomyopathy, EF 20-25% s/p Medtronic BiV-ICD last change 2013, denies ACID shock  Admission for CHF exacerbation, currently euvolemic on exam  HTN, BP elevated  Right toe and ankle pain rule out gout, negative for DVT, h/o ruptured baker cysts bilateral  Underlying CKD, hyperkalemic  h/o DVT on eliquis    Plan:  c/w home dose of lasix, clinically euvolemic on exam and CT chest with atelectasis  evaluation of right toe and ankle pain as per medical team  pt is concerned of depletion of her ICD battery, may consider interrogation  resume entresto once hyperkalemia and OMERO resolves  c/w toprol xl Assessment:  Non-ischemic cardiomyopathy, EF 20-25% s/p Medtronic BiV-ICD last change 2013, denies ACID shock  Admission for CHF exacerbation, currently euvolemic on exam  HTN, BP elevated  Right toe and ankle pain rule out gout, negative for DVT, h/o ruptured baker cysts bilateral  Underlying CKD, hyperkalemic  h/o DVT on eliquis    Plan:  c/w home dose of lasix, clinically euvolemic on exam and CT chest with atelectasis  evaluation of right toe and ankle pain as per medical team  resume entresto once hyperkalemia and OMERO resolves  if persistent hyperkalemia and cannot be restarted, consider hydralazine and nitrates for cardiomyopathy  c/w toprol xl Assessment:  Non-ischemic cardiomyopathy, EF 20-25% s/p Medtronic BiV-ICD last change 2013, denies ACID shock  Admission for CHF exacerbation, currently euvolemic on exam  HTN, BP elevated  Right toe and ankle pain rule out gout, negative for DVT, h/o ruptured baker cysts bilateral  Underlying CKD, hyperkalemic  h/o DVT on eliquis    Plan:  c/w home dose of lasix, clinically euvolemic on exam and CT chest with atelectasis  evaluation of right toe and ankle pain as per medical team  disc entresto, agree with hydralazine and isordil  c/w toprol xl  outpt f/up with dr. underwood

## 2020-12-15 NOTE — H&P ADULT - ASSESSMENT
86 yo F with pmhx of CHF, s/p pacemaker, DVT (on Eloquis), RA, gout, HTN, CKD was sent in to the ED by PMD Dr. Celeste for evaluation of worsening dyspnea on exertion which was associated with Left calf pain for few days. She also complained of dysuria and left sided flank pain x 2 days which was localized, non radiating, moderate in intensity and worsened with movement. Dyspnea and lower extremity swelling is exacerbated by walking. She denied any hematuria, fever, chills, paresthesias, nausea, vomiting, headache, dizziness.    Patient was received hypertensive in ER. Blood work showed mild anaemia, hyperkalemia, elevated creatinine and reduced GFR (unchanged from last blood work). Troponin were negative, BNP was elevated >22k. UA and COVID-19 was negative. Patient is being admitted for monitoring and r/o volume overload secondary to kidney dysfunction versus CHF.      # Dyspnea on exertion with lower extremity swelling, r/o CHF exacerbation vs volume overload from CKD:  -Non-ischemic cardiomyopathy (EF 20-25% in 6/2020)  -Recent nuclear stress test 06/2020 negative. Cath in 2014 was normal. Trops -ve at admission, BNP >22k  - CT abdomen and pelvis and CT chest showed Bibasilar peripheral opacities likely on the basis of subsegmental atelectasis  - history of V.tach in the past (as per Dr. Mckinney's outpatient notes). s/p BiV-ICD. No events recorded on Medtronic device by EP on 9/14 last admission  -hypertensive on arrival, ensure compliance with meds on discharge  -s/p IV lasix in ER  -Patient on 40mg of PO lasix 3 days/week and 20mg 2 days per week  -continue toprol and use lasix prn for congestion  -CXR in am  -f/u duplex LE  -monitor BP  -daily weight and strict I &O  -f/u troponin in am    #OMERO over CKD and hyperkalemia, enteresto?  -Cr increasing to 3 12/15/2020 from 2.8 9/15 (2.1 baseline).  - K+ increased to 5.6 from 5.3 9/15.  - Per cardiology notes, has been taken off entresto for similar hyperkalemia/renal function in past but was sent home on last discharge  -will hold Entresto for now    # Hyperlipidemia  - Continue atorvastatin 40mg qd    # History of PE/DVT  -c/w eliquis 2.5 BID    # Glaucoma  - Continue eye drops - latanaprost 0.005% solution    DVT ppx: on eliquis  GI ppx: pantoprazole 40mg qD  Diet: DASH/TLC  Activity: Ambulate as tolerated  FULL CODE, [place]   84 yo F with pmhx of CHF, s/p pacemaker, DVT (on Eloquis), RA, gout, HTN, CKD was sent in to the ED by PMD Dr. Celeste for evaluation of worsening dyspnea on exertion which was associated with Left calf pain for few days. She also complained of dysuria and left sided flank pain x 2 days which was localized, non radiating, moderate in intensity and worsened with movement. Dyspnea and lower extremity swelling is exacerbated by walking. She denied any hematuria, fever, chills, paresthesias, nausea, vomiting, headache, dizziness.    Patient was received hypertensive in ER. Blood work showed mild anaemia, hyperkalemia, elevated creatinine and reduced GFR (unchanged from last blood work). Troponin were negative, BNP was elevated >22k. UA and COVID-19 was negative. Patient is being admitted for monitoring and r/o volume overload secondary to kidney dysfunction versus CHF.      # Dyspnea on exertion with lower extremity swelling, r/o CHF exacerbation vs volume overload from CKD:  -Non-ischemic cardiomyopathy (EF 20-25% in 6/2020)  -Recent nuclear stress test 06/2020 negative. Cath in 2014 was normal. Trops -ve at admission, BNP >22k  - CT abdomen and pelvis and CT chest showed Bibasilar peripheral opacities likely on the basis of subsegmental atelectasis  - history of V.tach in the past (as per Dr. Mckinney's outpatient notes). s/p BiV-ICD. No events recorded on Medtronic device by EP on 9/14 last admission  -hypertensive on arrival, ensure compliance with meds on discharge  -s/p IV lasix in ER  -Patient on 40mg of PO lasix 3 days/week and 20mg 2 days per week  -continue toprol and use lasix IV prn for congestion  -CXR in am  -f/u duplex LE  -monitor BP  -daily weight and strict I &O  -f/u troponin in am    #Left flank pain with tenderness, dysuria  -UA and CT abdomen pelvis negative for cystitis  -will not start antibiotics  -bladder scan and straight cath if retaining    #OMERO over CKD and hyperkalemia, enteresto?  -Cr increasing to 3 12/15/2020 from 2.8 9/15 (2.1 baseline).  - K+ increased to 5.6 from 5.3 9/15.  - Per cardiology notes, has been taken off entresto for similar hyperkalemia/renal function in past but was sent home on last discharge  -will hold Entresto for now    # Hyperlipidemia  - Continue atorvastatin 40mg qd    # History of PE/DVT  -c/w eliquis 2.5 BID    # Glaucoma  - Continue eye drops - latanaprost 0.005% solution    DVT ppx: on eliquis  GI ppx: pantoprazole 40mg qD  Diet: DASH/TLC  Activity: Ambulate as tolerated  FULL CODE, [employer]   86 yo F with pmhx of sys CHF, s/p BiVICD, DVT (on Eliquis), RA, gout, HTN, CKD was sent in to the ED by PMD Dr. Celeste for evaluation of worsening dyspnea on exertion, which was associated with Left calf pain for few days. She also complained of dysuria and left sided flank pain x 2 days, which was localized, non radiating, moderate in intensity and worsened with movement.     # Left flank/back pain and left arm pain, likely related to her RA/GOUT/OA  no dysuria now; U/A neg; no fever or WBC rise  CT abdomen pelvis negative for cystitis/pyelo  lidoderm patches (up to 2) on painful areas on back q24  tylenol 650mg po q6 around the clock  robaxin 750mg po q8 around the clock  pt does not want narcotics, not even tramadol  no NSAIDs given CKD and CHF hx    # GARCIA with mild lower extremity swelling; + SEV Pulm HTN; chr sev LV dysfxn (non-isch); G2 baker dysfxn; mild MR; HTN (not well controlled); s/p BiVICD (hx V Tach)  Trops -ve at admission, BNP >22k  there is not much vol overload on exam or CT  V Dupl: neg DVT  no need to rpt echo (last 6/2020)  try lasix 20mg po q24 for now  Recent nuclear stress test 06/2020 negative. Cath in 2014 was normal.   incent nadine; OOBTC  c/w toprol XL 25mg po q24  start hydralazine 35mg po q8  d/c Entresto (OMERO + hyperkalemia)  no aldactone  cardi - Dr Mckinney  EPS - Dr Nails to eval ICD  daily wts - do NOT over diurese     # OMERO (prob cardiorenal synd) on CKD 4 (base Cr 2.1); hyperkalemia  d/c enteresto  no renal eval for now  no IVFs; oral hydration  BMP q24    # + signif GOUT and RA on exam, jacob hands  check ESR  check Uric Acid  pain meds as above  outpt f/u w/ rhuem (Dr Sandy Jack)    # Hyperlipidemia  c/w atorvastatin 40mg qd    # History of PE/DVT  c/w eliquis 2.5 BID    # Glaucoma  c/w latanaprost 0.005% solution    # DVT ppx: on eliquis    # GI ppx: pantoprazole 40mg qD    # Activity: Ambulate as tolerated w/ asst; uses walker; PT eval (home PT)    # FULL CODE, [employer]    Dispo: tx pain; f/u cardio/EPS; PT eval  will eventually send home w/ home PT

## 2020-12-15 NOTE — CHART NOTE - NSCHARTNOTEFT_GEN_A_CORE
Medtronic BiV AICD interrogated 12/15  No events noted  Optivol volume increased showing retained fluid    LV lead failed to capture at high outputs, therefore it was turned off to save battery (does not need to be on for device to function)  Device functioning properly    Recall EP as needed [id number]

## 2020-12-15 NOTE — H&P ADULT - HISTORY OF PRESENT ILLNESS
84 yo F with pmhx of CHF, s/p pacemaker, DVT (on Eloquis), RA, gout, HTN, CKD was sent in to the ED by PMD Dr. Celeste for evaluation of worsening dyspnea on exertion which was associated with Left calf pain for few days. She also complained of dysuria and left sided flank pain x 2 days which was localized, non radiating, moderate in intensity and worsened with movement. Dyspnea and lower extremity swelling is exacerbated by walking. She denied any hematuria, fever, chills, paresthesias, nausea, vomiting, headache, dizziness.    Vital Signs (24 Hrs):  T(C): 36.1 (12-15-20 @ 01:03), Max: 36.8 (12-14-20 @ 16:49)  HR: 86 (12-15-20 @ 01:03) (86 - 91)  BP: 170/76 (12-15-20 @ 01:03) (170/76 - 176/93)  RR: 18 (12-15-20 @ 01:03) (18 - 19)  SpO2: 99% (12-15-20 @ 01:03) (99% - 100%)      Patient was received hypertensive in ER. Blood work showed mild anaemia, hyperkalemia, elevated creatinine and reduced GFR (unchanged from last blood work). Troponin were negative, BNP was elevated >22k. UA and COVID-19 was negative. CT abdomen and pelvis and CT chest showed Bibasilar peripheral opacities likely on the basis of subsegmental atelectasis. Patient is being admitted for monitoring and r/o volume overload secondary to kidney dysfunction versus CHF.       86 yo F with pmhx of CHF, s/p pacemaker, DVT (on Eloquis), RA, gout, HTN, CKD was sent in to the ED by PMD Dr. Celeste for evaluation of worsening dyspnea on exertion which was associated with Left calf pain for few days. She also complained of dysuria and left sided flank pain x 2 days which was localized, non radiating, moderate in intensity and worsened with movement. Dyspnea and lower extremity swelling is exacerbated by walking. She denied any hematuria, fever, chills, paresthesias, nausea, vomiting, headache, dizziness.    Vital Signs (24 Hrs):  T(C): 36.1 (12-15-20 @ 01:03), Max: 36.8 (12-14-20 @ 16:49)  HR: 86 (12-15-20 @ 01:03) (86 - 91)  BP: 170/76 (12-15-20 @ 01:03) (170/76 - 176/93)  RR: 18 (12-15-20 @ 01:03) (18 - 19)  SpO2: 99% (12-15-20 @ 01:03) (99% - 100%)    Patient was received hypertensive in ER. Blood work showed mild anaemia, hyperkalemia, elevated creatinine and reduced GFR (unchanged from last blood work). Troponin were negative, BNP was elevated >22k. UA and COVID-19 was negative. CT abdomen and pelvis and CT chest showed Bibasilar peripheral opacities likely on the basis of subsegmental atelectasis. Patient is being admitted for monitoring and r/o volume overload secondary to kidney dysfunction versus CHF.      Attd: pt has long standing RA and chr body aches, jacob of left arm and back; she has tried RA meds in past, but they do not agree w/ her body; pt does NOT like to take strong narcotics; she says lidoderm helps; she is agreeable to tylenol and a muscle relaxant; she also has gout; pain is worse w/ mvmt, so seems musculoskel; pt was c/o SOB, but does not seem too vol overload; she denies palpitations or CP; she is usually not on home O2; she lives in a house w/ her son and her dtr has a day care on the second floor; she does not want to go to SNF, but she would like home PT for her unsteady gait; she is [employer]; she can eat and drink; she is well oriented and knows her med hx fairly well

## 2020-12-15 NOTE — CONSULT NOTE ADULT - SUBJECTIVE AND OBJECTIVE BOX
Date of Admission: 12-15-20    CHIEF COMPLAINT: Patient is a 85y old  Female who presents with a chief complaint of shortness of breath, flank pain (15 Dec 2020 02:09)      HPI:  84 yo F with pmhx of CHF, s/p pacemaker, DVT (on Eloquis), RA, gout, HTN, CKD was sent in to the ED by PMD Dr. Celeste for evaluation of worsening dyspnea on exertion which was associated with Left calf pain for few days. She also complained of dysuria and left sided flank pain x 2 days which was localized, non radiating, moderate in intensity and worsened with movement. Dyspnea and lower extremity swelling is exacerbated by walking. She denied any hematuria, fever, chills, paresthesias, nausea, vomiting, headache, dizziness.    Vital Signs (24 Hrs):  T(C): 36.1 (12-15-20 @ 01:03), Max: 36.8 (12-14-20 @ 16:49)  HR: 86 (12-15-20 @ 01:03) (86 - 91)  BP: 170/76 (12-15-20 @ 01:03) (170/76 - 176/93)  RR: 18 (12-15-20 @ 01:03) (18 - 19)  SpO2: 99% (12-15-20 @ 01:03) (99% - 100%)      Patient was received hypertensive in ER. Blood work showed mild anaemia, hyperkalemia, elevated creatinine and reduced GFR (unchanged from last blood work). Troponin were negative, BNP was elevated >22k. UA and COVID-19 was negative. CT abdomen and pelvis and CT chest showed Bibasilar peripheral opacities likely on the basis of subsegmental atelectasis. Patient is being admitted for monitoring and r/o volume overload secondary to kidney dysfunction versus CHF.       (15 Dec 2020 02:09)      PAST MEDICAL & SURGICAL HISTORY:  DVT, lower extremity    Rheumatoid arthritis    Diastolic heart failure    Diverticulitis  sigmoid    Gout    Hypertension    Pacemaker    Chronic kidney disease    History of hysterectomy    History of tonsillectomy    History of appendectomy    Artificial pacemaker        FAMILY HISTORY:  FH: arthritis  sister  [x] no pertinent family history of premature cardiovascular disease in first degree relatives.    SOCIAL HISTORY:    [x] Non-smoker  [x] No alcohol use  [x] No illicit drug use    Allergies: Keflex (Swelling)    REVIEW OF SYSTEMS:  CONSTITUTIONAL: No fever, weight loss, or fatigue  CARDIOLOGY: (+) Dyspnea of exertion. No chest pain or syncopal episodes.   RESPIRATORY: (+) SOB. No cough, wheezing.   NEUROLOGICAL: No weakness, no focal deficits to report.  GI: No BRBPR, no N,V,diarrhea.    PSYCHIATRY: Normal mood and affect.  HEENT: No nasal discharge, no ecchymosis  SKIN: No ecchymosis, no breakdown  MUSCULOSKELETAL: Full range of motion x4. (+) Right shoulder pain. (+) Left lateral wrist pain. (+) Right toe and ankle pain and swelling.  EXTREM: No leg swelling or erythema.    PHYSICAL EXAM:  T(C): 35.8 (12-15-20 @ 04:26), Max: 36.8 (12-14-20 @ 16:49)  HR: 72 (12-15-20 @ 04:26) (72 - 91)  BP: 173/90 (12-15-20 @ 04:26) (170/76 - 176/93)  RR: 18 (12-15-20 @ 04:26) (18 - 19)  SpO2: 98% (12-15-20 @ 08:19) (98% - 100%)  Wt(kg): --  I&O's Summary      General Appearance: NAD, normal for age and gender. 	  Neck: Normal JVP, no bruit.   Eyes: No xanthomalasia, Extra Ocular muscles intact.   Cardiovascular: Regular rate and rhythm S1 S2, No JVD, No murmurs.  Respiratory: Lungs clear to auscultation. No wheezes, rales or rhonchi.  Psychiatry: Alert and oriented x 3, Mood & affect appropriate  Gastrointestinal:  Soft, Non-tender  Skin/Integumen: No rashes, No ecchymoses, No cyanosis	  Neurologic: Non-focal deficits.  Musculoskeletal/ extremities: Normal range of motion, No clubbing, cyanosis or edema. (+) Right toe and ankle tenderness. (+) Right shoulder tenderness. (+) Left wrist tenderness.  Vascular: Peripheral pulses palpable bilaterally    LABS:	 	                        9.7    8.34  )-----------( 237      ( 15 Dec 2020 06:30 )             31.6     12-15    137  |  102  |  48<H>  ----------------------------<  83  5.1<H>   |  23  |  3.0<H>    Ca    8.9      15 Dec 2020 06:30  Phos  4.9     12-15  Mg     1.6     12-15    TPro  6.8  /  Alb  3.6  /  TBili  0.3  /  DBili  x   /  AST  35  /  ALT  16  /  AlkPhos  102  12-15    CARDIAC MARKERS ( 15 Dec 2020 06:30 )  x     / <0.01 ng/mL / x     / x     / x      CARDIAC MARKERS ( 14 Dec 2020 18:20 )  x     / <0.01 ng/mL / x     / x     / x          PT/INR - ( 14 Dec 2020 18:20 )   PT: 13.10 sec;   INR: 1.14 ratio    PTT - ( 14 Dec 2020 18:20 )  PTT:36.3 sec    TELEMETRY EVENTS: 	    ECG: < from: 12 Lead ECG (12.14.20 @ 16:52) >  Diagnosis Line Atrial-sensed ventricular-paced rhythm  Abnormal ECG    RADIOLOGY: < from: CT Chest No Cont (12.14.20 @ 20:46) >  Bibasilar peripheral opacities likely on the basis of subsegmental atelectasis, infection felt less likely.  No definite noncontrast evidence for acute infectious/inflammatory process within the chest, abdomen or pelvis.    OTHER: 	    PREVIOUS DIAGNOSTIC TESTING:    [x] Echocardiogram: < from: Transthoracic Echocardiogram (06.21.20 @ 11:33) >   1. Left ventricular ejection fraction, by visual estimation, is 20 to 25%.   2. Severely decreased global left ventricular systolic function.   3. Spectral Doppler shows pseudonormal pattern of left ventricular myocardial filling (Grade II diastolic dysfunction).   4. Left atrium is mildly enlarged.   5. Mildly enlarged right atrium.   6. Mild mitral valve regurgitation.   7. Mild thickening of the anterior and posterior mitral valve leaflets.   8. Moderate tricuspid regurgitation.   9. Estimated pulmonary artery systolic pressure is 63.7 mmHg assuming a right atrial pressure of 8 mmHg, which is consistent with severe pulmonary hypertension.    [x] Catheterization: 1/10/14  LM no disease  LAD mild disease  Circ no disease  RCA luminal irregularities    [x] Stress Test: < from: NM Nuclear Stress Pharmacologic Multiple (06.22.20 @ 11:26) >  1. NORMAL ADENOSINE / REST MYOCARDIAL PERFUSION SPECT TOMOGRAPHY, WITH NO EVIDENCE FOR ISCHEMIA DURING ADENOSINE INFUSION.   2. NORMAL RESTING LEFT VENTRICULAR WALL MOTION AND WALL THICKENING.  3. LEFT VENTRICULAR EJECTION FRACTION OF  56 % WHICH IS WITHIN RANGE OF NORMAL.     Home Medications:  apixaban 2.5 mg oral tablet: 1 tab(s) orally 2 times a day (15 Sep 2020 11:08)  atorvastatin 40 mg oral tablet: 1 tab(s) orally once a day (13 Sep 2020 08:58)  calcium acetate 667 mg oral tablet: 1 tab(s) orally once a day (13 Sep 2020 08:58)  folic acid 1 mg oral tablet: 1 tab(s) orally once a day (13 Sep 2020 08:58)  furosemide 20 mg oral tablet: 1 tab(s) orally Tuesday, Thursday, Saturday, Sunday (13 Sep 2020 08:58)  latanoprost 0.005% ophthalmic solution: 1 drop(s) to each affected eye once a day (in the evening) (13 Sep 2020 08:58)  Metoprolol Succinate ER 25 mg oral tablet, extended release: 1 tab(s) orally once a day (13 Sep 2020 08:58)  sertraline 50 mg oral tablet: 1 tab(s) orally once a day (13 Sep 2020 08:58)    MEDICATIONS  (STANDING):  apixaban 2.5 milliGRAM(s) Oral every 12 hours  atorvastatin Oral Tab/Cap - Peds 40 milliGRAM(s) Oral daily  calcium acetate 667 milliGRAM(s) Oral four times a day with meals  chlorhexidine 4% Liquid 1 Application(s) Topical <User Schedule>  ferrous    sulfate 325 milliGRAM(s) Oral daily  folic acid 1 milliGRAM(s) Oral daily  furosemide    Tablet 20 milliGRAM(s) Oral daily  hydrALAZINE 25 milliGRAM(s) Oral three times a day  latanoprost 0.005% Ophthalmic Solution 1 Drop(s) Both EYES at bedtime  lidocaine   Patch 1 Patch Transdermal every 24 hours  metoprolol succinate ER 25 milliGRAM(s) Oral daily  pantoprazole    Tablet 40 milliGRAM(s) Oral before breakfast  sertraline 50 milliGRAM(s) Oral daily    MEDICATIONS  (PRN):  acetaminophen   Tablet .. 650 milliGRAM(s) Oral every 6 hours PRN Temp greater or equal to 38C (100.4F), Mild Pain (1 - 3)

## 2020-12-16 LAB
ANION GAP SERPL CALC-SCNC: 11 MMOL/L — SIGNIFICANT CHANGE UP (ref 7–14)
BASOPHILS # BLD AUTO: 0.03 K/UL — SIGNIFICANT CHANGE UP (ref 0–0.2)
BASOPHILS NFR BLD AUTO: 0.4 % — SIGNIFICANT CHANGE UP (ref 0–1)
BUN SERPL-MCNC: 49 MG/DL — HIGH (ref 10–20)
CALCIUM SERPL-MCNC: 8.3 MG/DL — LOW (ref 8.5–10.1)
CHLORIDE SERPL-SCNC: 100 MMOL/L — SIGNIFICANT CHANGE UP (ref 98–110)
CO2 SERPL-SCNC: 24 MMOL/L — SIGNIFICANT CHANGE UP (ref 17–32)
CREAT SERPL-MCNC: 3 MG/DL — HIGH (ref 0.7–1.5)
CULTURE RESULTS: SIGNIFICANT CHANGE UP
EOSINOPHIL # BLD AUTO: 0.53 K/UL — SIGNIFICANT CHANGE UP (ref 0–0.7)
EOSINOPHIL NFR BLD AUTO: 7.2 % — SIGNIFICANT CHANGE UP (ref 0–8)
GLUCOSE SERPL-MCNC: 85 MG/DL — SIGNIFICANT CHANGE UP (ref 70–99)
HCT VFR BLD CALC: 30.1 % — LOW (ref 37–47)
HGB BLD-MCNC: 9.2 G/DL — LOW (ref 12–16)
IMM GRANULOCYTES NFR BLD AUTO: 0.3 % — SIGNIFICANT CHANGE UP (ref 0.1–0.3)
LYMPHOCYTES # BLD AUTO: 2.48 K/UL — SIGNIFICANT CHANGE UP (ref 1.2–3.4)
LYMPHOCYTES # BLD AUTO: 33.5 % — SIGNIFICANT CHANGE UP (ref 20.5–51.1)
MAGNESIUM SERPL-MCNC: 1.7 MG/DL — LOW (ref 1.8–2.4)
MCHC RBC-ENTMCNC: 28 PG — SIGNIFICANT CHANGE UP (ref 27–31)
MCHC RBC-ENTMCNC: 30.6 G/DL — LOW (ref 32–37)
MCV RBC AUTO: 91.8 FL — SIGNIFICANT CHANGE UP (ref 81–99)
MONOCYTES # BLD AUTO: 1.35 K/UL — HIGH (ref 0.1–0.6)
MONOCYTES NFR BLD AUTO: 18.2 % — HIGH (ref 1.7–9.3)
NEUTROPHILS # BLD AUTO: 2.99 K/UL — SIGNIFICANT CHANGE UP (ref 1.4–6.5)
NEUTROPHILS NFR BLD AUTO: 40.4 % — LOW (ref 42.2–75.2)
NRBC # BLD: 0 /100 WBCS — SIGNIFICANT CHANGE UP (ref 0–0)
PLATELET # BLD AUTO: 229 K/UL — SIGNIFICANT CHANGE UP (ref 130–400)
POTASSIUM SERPL-MCNC: 5.4 MMOL/L — HIGH (ref 3.5–5)
POTASSIUM SERPL-SCNC: 5.4 MMOL/L — HIGH (ref 3.5–5)
RBC # BLD: 3.28 M/UL — LOW (ref 4.2–5.4)
RBC # FLD: 14 % — SIGNIFICANT CHANGE UP (ref 11.5–14.5)
SODIUM SERPL-SCNC: 135 MMOL/L — SIGNIFICANT CHANGE UP (ref 135–146)
SPECIMEN SOURCE: SIGNIFICANT CHANGE UP
URATE SERPL-MCNC: 6.7 MG/DL — SIGNIFICANT CHANGE UP (ref 2.5–7)
WBC # BLD: 7.4 K/UL — SIGNIFICANT CHANGE UP (ref 4.8–10.8)
WBC # FLD AUTO: 7.4 K/UL — SIGNIFICANT CHANGE UP (ref 4.8–10.8)

## 2020-12-16 PROCEDURE — 99233 SBSQ HOSP IP/OBS HIGH 50: CPT

## 2020-12-16 PROCEDURE — 71045 X-RAY EXAM CHEST 1 VIEW: CPT | Mod: 26

## 2020-12-16 PROCEDURE — 99222 1ST HOSP IP/OBS MODERATE 55: CPT

## 2020-12-16 RX ORDER — MAGNESIUM OXIDE 400 MG ORAL TABLET 241.3 MG
400 TABLET ORAL
Refills: 0 | Status: DISCONTINUED | OUTPATIENT
Start: 2020-12-16 | End: 2020-12-17

## 2020-12-16 RX ORDER — MAGNESIUM SULFATE 500 MG/ML
2 VIAL (ML) INJECTION ONCE
Refills: 0 | Status: DISCONTINUED | OUTPATIENT
Start: 2020-12-16 | End: 2020-12-16

## 2020-12-16 RX ORDER — MORPHINE SULFATE 50 MG/1
2 CAPSULE, EXTENDED RELEASE ORAL EVERY 8 HOURS
Refills: 0 | Status: DISCONTINUED | OUTPATIENT
Start: 2020-12-16 | End: 2020-12-17

## 2020-12-16 RX ADMIN — METHOCARBAMOL 750 MILLIGRAM(S): 500 TABLET, FILM COATED ORAL at 13:26

## 2020-12-16 RX ADMIN — LATANOPROST 1 DROP(S): 0.05 SOLUTION/ DROPS OPHTHALMIC; TOPICAL at 21:33

## 2020-12-16 RX ADMIN — LIDOCAINE 2 PATCH: 4 CREAM TOPICAL at 19:43

## 2020-12-16 RX ADMIN — Medication 667 MILLIGRAM(S): at 21:33

## 2020-12-16 RX ADMIN — Medication 25 MILLIGRAM(S): at 13:26

## 2020-12-16 RX ADMIN — APIXABAN 2.5 MILLIGRAM(S): 2.5 TABLET, FILM COATED ORAL at 05:28

## 2020-12-16 RX ADMIN — Medication 650 MILLIGRAM(S): at 17:16

## 2020-12-16 RX ADMIN — Medication 650 MILLIGRAM(S): at 12:22

## 2020-12-16 RX ADMIN — Medication 325 MILLIGRAM(S): at 12:21

## 2020-12-16 RX ADMIN — Medication 650 MILLIGRAM(S): at 05:28

## 2020-12-16 RX ADMIN — CHLORHEXIDINE GLUCONATE 1 APPLICATION(S): 213 SOLUTION TOPICAL at 05:27

## 2020-12-16 RX ADMIN — Medication 667 MILLIGRAM(S): at 09:46

## 2020-12-16 RX ADMIN — MAGNESIUM OXIDE 400 MG ORAL TABLET 400 MILLIGRAM(S): 241.3 TABLET ORAL at 17:16

## 2020-12-16 RX ADMIN — LIDOCAINE 2 PATCH: 4 CREAM TOPICAL at 02:48

## 2020-12-16 RX ADMIN — METHOCARBAMOL 750 MILLIGRAM(S): 500 TABLET, FILM COATED ORAL at 05:27

## 2020-12-16 RX ADMIN — Medication 25 MILLIGRAM(S): at 05:27

## 2020-12-16 RX ADMIN — Medication 20 MILLIGRAM(S): at 05:28

## 2020-12-16 RX ADMIN — SERTRALINE 50 MILLIGRAM(S): 25 TABLET, FILM COATED ORAL at 12:21

## 2020-12-16 RX ADMIN — APIXABAN 2.5 MILLIGRAM(S): 2.5 TABLET, FILM COATED ORAL at 17:16

## 2020-12-16 RX ADMIN — PANTOPRAZOLE SODIUM 40 MILLIGRAM(S): 20 TABLET, DELAYED RELEASE ORAL at 05:27

## 2020-12-16 RX ADMIN — Medication 25 MILLIGRAM(S): at 21:34

## 2020-12-16 RX ADMIN — ATORVASTATIN CALCIUM 40 MILLIGRAM(S): 80 TABLET, FILM COATED ORAL at 12:22

## 2020-12-16 RX ADMIN — Medication 667 MILLIGRAM(S): at 12:21

## 2020-12-16 RX ADMIN — METHOCARBAMOL 750 MILLIGRAM(S): 500 TABLET, FILM COATED ORAL at 21:33

## 2020-12-16 RX ADMIN — LIDOCAINE 2 PATCH: 4 CREAM TOPICAL at 12:21

## 2020-12-16 RX ADMIN — Medication 667 MILLIGRAM(S): at 17:16

## 2020-12-16 RX ADMIN — Medication 650 MILLIGRAM(S): at 00:16

## 2020-12-16 RX ADMIN — Medication 1 MILLIGRAM(S): at 12:21

## 2020-12-16 RX ADMIN — Medication 650 MILLIGRAM(S): at 12:25

## 2020-12-16 RX ADMIN — Medication 650 MILLIGRAM(S): at 17:17

## 2020-12-16 NOTE — MEDICAL STUDENT PROGRESS NOTE(EDUCATION) - SUBJECTIVE AND OBJECTIVE BOX
Medical Student Note    Patient Information:  RACHEL SUMMERS / 85y / Female / MRN#:[id number]    Hospital Day: 1d    HPI:  84 yo F with pmhx of CHF, s/p pacemaker, DVT (on Eloquis), RA, gout, HTN, CKD was sent in to the ED by PMD Dr. Celeste for evaluation of worsening dyspnea on exertion which was associated with Left calf pain for few days. She also complained of dysuria and left sided flank pain x 2 days which was localized, non radiating, moderate in intensity and worsened with movement. Dyspnea and lower extremity swelling is exacerbated by walking. She denied any hematuria, fever, chills, paresthesias, nausea, vomiting, headache, dizziness.    Vital Signs (24 Hrs):  T(C): 36.1 (12-15-20 @ 01:03), Max: 36.8 (20 @ 16:49)  HR: 86 (12-15-20 @ 01:03) (86 - 91)  BP: 170/76 (12-15-20 @ 01:03) (170/76 - 176/93)  RR: 18 (12-15-20 @ 01:03) (18 - 19)  SpO2: 99% (12-15-20 @ 01:03) (99% - 100%)    Patient was received hypertensive in ER. Blood work showed mild anaemia, hyperkalemia, elevated creatinine and reduced GFR (unchanged from last blood work). Troponin were negative, BNP was elevated >22k. UA and COVID-19 was negative. CT abdomen and pelvis and CT chest showed Bibasilar peripheral opacities likely on the basis of subsegmental atelectasis. Patient is being admitted for monitoring and r/o volume overload secondary to kidney dysfunction versus CHF.      Attd: pt has long standing RA and chr body aches, jacob of left arm and back; she has tried RA meds in past, but they do not agree w/ her body; pt does NOT like to take strong narcotics; she says lidoderm helps; she is agreeable to tylenol and a muscle relaxant; she also has gout; pain is worse w/ mvmt, so seems musculoskel; pt was c/o SOB, but does not seem too vol overload; she denies palpitations or CP; she is usually not on home O2; she lives in a house w/ her son and her dtr has a day care on the second floor; she does not want to go to SNF, but she would like home PT for her unsteady gait; she is [employer]; she can eat and drink; she is well oriented and knows her med hx fairly well     Interval History:  Patient seen and examined at bedside. No acute events overnight. She reported excruciating back pain last night but this morning she is feeling improvement. She has been off the O2 and is 98% on room air. She had a bowel movement last night. She feels that she might be ready to go home tomorrow.      Past Medical History:  DVT, lower extremity  Rheumatoid arthritis  Diastolic heart failure  Atrial fibrillation  Diverticulitis  Gout  Hypertension  Pacemaker  Chronic kidney disease    Past Surgical History:  History of hysterectomy  History of tonsillectomy  History of appendectomy  Artificial pacemaker    Allergies:  Keflex (Swelling)    Medications:  PRN:  aluminum hydroxide/magnesium hydroxide/simethicone Suspension 30 milliLiter(s) Oral every 4 hours PRN Dyspepsia  morphine   Solution 2 milliGRAM(s) Oral every 8 hours PRN Severe Pain (7 - 10)    Standing:  acetaminophen   Tablet .. 650 milliGRAM(s) Oral every 6 hours  apixaban 2.5 milliGRAM(s) Oral every 12 hours  atorvastatin Oral Tab/Cap - Peds 40 milliGRAM(s) Oral daily  calcium acetate 667 milliGRAM(s) Oral four times a day with meals  chlorhexidine 4% Liquid 1 Application(s) Topical <User Schedule>  ferrous    sulfate 325 milliGRAM(s) Oral daily  folic acid 1 milliGRAM(s) Oral daily  furosemide    Tablet 20 milliGRAM(s) Oral daily  hydrALAZINE 25 milliGRAM(s) Oral three times a day  latanoprost 0.005% Ophthalmic Solution 1 Drop(s) Both EYES at bedtime  lidocaine   Patch 2 Patch Transdermal every 24 hours  magnesium oxide 400 milliGRAM(s) Oral two times a day with meals  methocarbamol 750 milliGRAM(s) Oral every 8 hours  metoprolol succinate ER 25 milliGRAM(s) Oral daily  pantoprazole    Tablet 40 milliGRAM(s) Oral before breakfast  sertraline 50 milliGRAM(s) Oral daily    Home:  apixaban 2.5 mg oral tablet: 1 tab(s) orally 2 times a day  atorvastatin 40 mg oral tablet: 1 tab(s) orally once a day  calcium acetate 667 mg oral tablet: 1 tab(s) orally once a day  folic acid 1 mg oral tablet: 1 tab(s) orally once a day  furosemide 20 mg oral tablet: 1 tab(s) orally Tuesday, Thursday, Saturday,   latanoprost 0.005% ophthalmic solution: 1 drop(s) to each affected eye once a day (in the evening)  Metoprolol Succinate ER 25 mg oral tablet, extended release: 1 tab(s) orally once a day  sertraline 50 mg oral tablet: 1 tab(s) orally once a day    Vitals:  T(C): 36, Max: 36.7 (12-15-20 @ 20:36)  T(F): 96.8, Max: 98 (12-15-20 @ 20:36)  HR: 74 (66 - 82)  BP: 144/68 (144/68 - 167/86)  RR: 18 (18 - 19)  SpO2: 98% (98% - 99%)    Physical Exam:  General: Awake, Alert. Not in acute distress.  Head: Normocephalic atraumatic.  Neck: Supple  Heart: Regular rate and rhythm; S1, S2; No murmurs.  Lungs: Clear to auscultation bilaterally.  Abdomen: Soft, nontender, nondistended.  Extremities: No edema in upper or lower extremities.  Neuro: AAOx3, No focal deficits.    Labs:  CBC ( @ 05:21)                        Hgb: 9.2    WBC: 7.40  )-----------------( Plts: 229                              Hct: 30.1     Chem ( @ 05:21)  Na: 135  |     Cl: 100     |  BUN: 49  -----------------------------------------< Gluc: 85    K: 5.4   |    HCO3: 24  |  Cr: 3.0    Ca 8.3 ( @ 05:21)  Phos 4.9 (12-15 @ 06:30)  Mg 1.7 (:21)    LFTs (12-15 @ 06:30)  TPro 6.8  /  Alb 3.6  TBili 0.3  /  DBili     AST 35  /  ALT 16  /  AlkPhos 102    Cardiac Markers (12-15 @ 21:15)  Troponin I X  Troponin T <0.01  CK X  CKMB X  CKMB Units X  Myoglobin X  Lactate X  ESR X    Cardiac Markers (12-15 @ 06:30)  Troponin I X  Troponin T <0.01  CK X  CKMB X  CKMB Units X  Myoglobin X  Lactate X  ESR X    Cardiac Markers ( @ 18:20)  Troponin I X  Troponin T <0.01  CK X  CKMB X  CKMB Units X  Myoglobin X  Lactate X  ESR X    PT/INR ( @ 18:20)  PT: 13.10; INR: 1.14   PTT: 36.3      Urinalysis Basic (12-15 @ 00:00)  Color: Colorless  Appearance: Clear  S.009  pH:   Gluc:   Ketone: Negative  Bili: Negative  Urobili: <2 mg/dL   Blood:   Protein: 30 mg/dL  Nitrite: Negative   Leuk Esterase: Negative  RBC: 1  WBC: 0   Sq Epi:   Non Sq Epi: 0  Bacteria: Negative    Microbiology:  Culture - Urine (collected 12-15 @ 00:00)  Source: .Urine Clean Catch (Midstream)  Final Report ( @ 09:15):    <10,000 CFU/mL Normal Urogenital Rand    Radiology:

## 2020-12-16 NOTE — PHYSICAL THERAPY INITIAL EVALUATION ADULT - GENERAL OBSERVATIONS, REHAB EVAL
Pt rec'd in bed awake, pleasant and cooperative and w/o complaint.  Pt was left sitting in b/s chair in No apparent distress and w/o compl

## 2020-12-16 NOTE — MEDICAL STUDENT PROGRESS NOTE(EDUCATION) - NS MD HP STUD ASPLAN PLAN FT
# Left flank/back pain and left arm pain, likely related to her RA/GOUT/OA  - no dysuria now; U/A neg; no fever or WBC rise  - CT abdomen pelvis negative for cystitis/pyelo  - lidoderm patches (up to 2) on painful areas on back q24  - tylenol 650mg po q6 around the clock  - robaxin 750mg po q8 around the clock  - will try Morphine sulfate liquid 2 mg po Q8 prn  - no NSAIDs given CKD and CHF hx    # GARCIA with mild lower extremity swelling; + SEV Pulm HTN; chr sev LV dysfxn (non-isch); G2 baker dysfxn; mild MR; HTN (not well controlled); s/p BiVICD (hx V Tach)  - Trops -ve at admission, BNP >22k  - there is not much vol overload on exam or CT  - V Dupl: neg DVT  - no need to rpt echo (last 6/2020)  - lasix 20mg po q24 for now  - Recent nuclear stress test 06/2020 negative. Cath in 2014 was normal.   - incent nadine; OOBTC  - c/w toprol XL 25mg po q24  - start hydralazine 25mg po q8  - d/c Entresto (OMERO + hyperkalemia)  - no aldactone  - cardi - Dr Mckinney  - ICD interrogated. No events. LV lead failed to capture at high outputs, therefore it was turned off to save battery (does not need to be on for device to function)  Device functioning properly   - daily wts - do NOT over diurese     # OMERO (prob cardiorenal synd) on CKD 4 (base Cr 2.1); hyperkalemia  - d/c enteresto  - no renal eval for now  - no IVFs; oral hydration  - Cr 3.0, K 5.4, Mg 1.7. Will start oral Mg replacement (ptn not to be discharged on Mg)  - BMP q24    # + signif GOUT and RA on exam, jacob hands  - f/u ESR  - f/u Uric Acid  - pain meds as above  - outpt f/u w/ rhuem (Dr Sandy Jack)    # Hyperlipidemia  - c/w atorvastatin 40mg qd    # History of PE/DVT  - c/w eliquis 2.5 BID    # Glaucoma  - c/w latanaprost 0.005% solution    # DVT ppx: on eliquis    # GI ppx: pantoprazole 40mg qD    # Activity: Ambulate as tolerated w/ asst; uses walker; PT eval (home PT)    # FULL CODE, Sh[employer]    Dispo: tx pain; PT eval  will eventually send home w/ home PT probably tomorrow

## 2020-12-16 NOTE — MEDICAL STUDENT PROGRESS NOTE(EDUCATION) - NS MD HP STUD ASPLAN ASSES FT
86 yo F with pmhx of sys CHF, s/p BiVICD, DVT (on Eliquis), RA, gout, HTN, CKD was sent in to the ED by PMD Dr. Celeste for evaluation of worsening dyspnea on exertion, which was associated with Left calf pain for few days. She also complained of dysuria and left sided flank pain x 2 days, which was localized, non radiating, moderate in intensity and worsened with movement.

## 2020-12-17 ENCOUNTER — TRANSCRIPTION ENCOUNTER (OUTPATIENT)
Age: 85
End: 2020-12-17

## 2020-12-17 VITALS
DIASTOLIC BLOOD PRESSURE: 74 MMHG | RESPIRATION RATE: 18 BRPM | TEMPERATURE: 97 F | SYSTOLIC BLOOD PRESSURE: 145 MMHG | HEART RATE: 80 BPM

## 2020-12-17 LAB
ANION GAP SERPL CALC-SCNC: 10 MMOL/L — SIGNIFICANT CHANGE UP (ref 7–14)
BASOPHILS # BLD AUTO: 0.03 K/UL — SIGNIFICANT CHANGE UP (ref 0–0.2)
BASOPHILS NFR BLD AUTO: 0.5 % — SIGNIFICANT CHANGE UP (ref 0–1)
BUN SERPL-MCNC: 48 MG/DL — HIGH (ref 10–20)
CALCIUM SERPL-MCNC: 8.3 MG/DL — LOW (ref 8.5–10.1)
CHLORIDE SERPL-SCNC: 101 MMOL/L — SIGNIFICANT CHANGE UP (ref 98–110)
CO2 SERPL-SCNC: 24 MMOL/L — SIGNIFICANT CHANGE UP (ref 17–32)
CREAT SERPL-MCNC: 3 MG/DL — HIGH (ref 0.7–1.5)
EOSINOPHIL # BLD AUTO: 0.6 K/UL — SIGNIFICANT CHANGE UP (ref 0–0.7)
EOSINOPHIL NFR BLD AUTO: 9.2 % — HIGH (ref 0–8)
GLUCOSE SERPL-MCNC: 89 MG/DL — SIGNIFICANT CHANGE UP (ref 70–99)
HCT VFR BLD CALC: 29 % — LOW (ref 37–47)
HGB BLD-MCNC: 9 G/DL — LOW (ref 12–16)
IMM GRANULOCYTES NFR BLD AUTO: 0.3 % — SIGNIFICANT CHANGE UP (ref 0.1–0.3)
LYMPHOCYTES # BLD AUTO: 2.3 K/UL — SIGNIFICANT CHANGE UP (ref 1.2–3.4)
LYMPHOCYTES # BLD AUTO: 35.1 % — SIGNIFICANT CHANGE UP (ref 20.5–51.1)
MAGNESIUM SERPL-MCNC: 1.8 MG/DL — SIGNIFICANT CHANGE UP (ref 1.8–2.4)
MCHC RBC-ENTMCNC: 28.3 PG — SIGNIFICANT CHANGE UP (ref 27–31)
MCHC RBC-ENTMCNC: 31 G/DL — LOW (ref 32–37)
MCV RBC AUTO: 91.2 FL — SIGNIFICANT CHANGE UP (ref 81–99)
MONOCYTES # BLD AUTO: 1.07 K/UL — HIGH (ref 0.1–0.6)
MONOCYTES NFR BLD AUTO: 16.3 % — HIGH (ref 1.7–9.3)
NEUTROPHILS # BLD AUTO: 2.53 K/UL — SIGNIFICANT CHANGE UP (ref 1.4–6.5)
NEUTROPHILS NFR BLD AUTO: 38.6 % — LOW (ref 42.2–75.2)
NRBC # BLD: 0 /100 WBCS — SIGNIFICANT CHANGE UP (ref 0–0)
PLATELET # BLD AUTO: 215 K/UL — SIGNIFICANT CHANGE UP (ref 130–400)
POTASSIUM SERPL-MCNC: 5.3 MMOL/L — HIGH (ref 3.5–5)
POTASSIUM SERPL-SCNC: 5.3 MMOL/L — HIGH (ref 3.5–5)
RBC # BLD: 3.18 M/UL — LOW (ref 4.2–5.4)
RBC # FLD: 13.9 % — SIGNIFICANT CHANGE UP (ref 11.5–14.5)
SODIUM SERPL-SCNC: 135 MMOL/L — SIGNIFICANT CHANGE UP (ref 135–146)
WBC # BLD: 6.55 K/UL — SIGNIFICANT CHANGE UP (ref 4.8–10.8)
WBC # FLD AUTO: 6.55 K/UL — SIGNIFICANT CHANGE UP (ref 4.8–10.8)

## 2020-12-17 RX ORDER — HYDRALAZINE HCL 50 MG
1 TABLET ORAL
Qty: 0 | Refills: 0 | DISCHARGE
Start: 2020-12-17

## 2020-12-17 RX ORDER — ACETAMINOPHEN 500 MG
2 TABLET ORAL
Qty: 0 | Refills: 0 | DISCHARGE
Start: 2020-12-17

## 2020-12-17 RX ADMIN — Medication 325 MILLIGRAM(S): at 12:21

## 2020-12-17 RX ADMIN — METHOCARBAMOL 750 MILLIGRAM(S): 500 TABLET, FILM COATED ORAL at 14:44

## 2020-12-17 RX ADMIN — METHOCARBAMOL 750 MILLIGRAM(S): 500 TABLET, FILM COATED ORAL at 05:26

## 2020-12-17 RX ADMIN — APIXABAN 2.5 MILLIGRAM(S): 2.5 TABLET, FILM COATED ORAL at 17:33

## 2020-12-17 RX ADMIN — Medication 667 MILLIGRAM(S): at 17:33

## 2020-12-17 RX ADMIN — Medication 20 MILLIGRAM(S): at 05:26

## 2020-12-17 RX ADMIN — CHLORHEXIDINE GLUCONATE 1 APPLICATION(S): 213 SOLUTION TOPICAL at 05:26

## 2020-12-17 RX ADMIN — MAGNESIUM OXIDE 400 MG ORAL TABLET 400 MILLIGRAM(S): 241.3 TABLET ORAL at 08:20

## 2020-12-17 RX ADMIN — Medication 650 MILLIGRAM(S): at 00:15

## 2020-12-17 RX ADMIN — Medication 667 MILLIGRAM(S): at 08:21

## 2020-12-17 RX ADMIN — Medication 1 MILLIGRAM(S): at 12:20

## 2020-12-17 RX ADMIN — Medication 650 MILLIGRAM(S): at 12:20

## 2020-12-17 RX ADMIN — MAGNESIUM OXIDE 400 MG ORAL TABLET 400 MILLIGRAM(S): 241.3 TABLET ORAL at 17:33

## 2020-12-17 RX ADMIN — Medication 650 MILLIGRAM(S): at 05:26

## 2020-12-17 RX ADMIN — LIDOCAINE 2 PATCH: 4 CREAM TOPICAL at 00:15

## 2020-12-17 RX ADMIN — SERTRALINE 50 MILLIGRAM(S): 25 TABLET, FILM COATED ORAL at 12:20

## 2020-12-17 RX ADMIN — ATORVASTATIN CALCIUM 40 MILLIGRAM(S): 80 TABLET, FILM COATED ORAL at 12:20

## 2020-12-17 RX ADMIN — Medication 25 MILLIGRAM(S): at 14:44

## 2020-12-17 RX ADMIN — PANTOPRAZOLE SODIUM 40 MILLIGRAM(S): 20 TABLET, DELAYED RELEASE ORAL at 05:25

## 2020-12-17 RX ADMIN — Medication 650 MILLIGRAM(S): at 17:33

## 2020-12-17 RX ADMIN — Medication 25 MILLIGRAM(S): at 05:25

## 2020-12-17 RX ADMIN — Medication 667 MILLIGRAM(S): at 12:20

## 2020-12-17 RX ADMIN — Medication 25 MILLIGRAM(S): at 05:26

## 2020-12-17 RX ADMIN — APIXABAN 2.5 MILLIGRAM(S): 2.5 TABLET, FILM COATED ORAL at 05:26

## 2020-12-17 NOTE — DISCHARGE NOTE PROVIDER - NSDCMRMEDTOKEN_GEN_ALL_CORE_FT
acetaminophen 325 mg oral tablet: 2 tab(s) orally every 6 hours  apixaban 2.5 mg oral tablet: 1 tab(s) orally 2 times a day  atorvastatin 40 mg oral tablet: 1 tab(s) orally once a day  calcium acetate 667 mg oral tablet: 1 tab(s) orally once a day  ferrous sulfate 325 mg (65 mg elemental iron) oral tablet: 1 tab(s) orally once a day   folic acid 1 mg oral tablet: 1 tab(s) orally once a day  furosemide 20 mg oral tablet: 1 tab(s) orally Tuesday, Thursday, Saturday, Sunday  hydrALAZINE 25 mg oral tablet: 1 tab(s) orally 3 times a day  latanoprost 0.005% ophthalmic solution: 1 drop(s) to each affected eye once a day (in the evening)  Metoprolol Succinate ER 25 mg oral tablet, extended release: 1 tab(s) orally once a day  pantoprazole 40 mg oral delayed release tablet: 1 tab(s) orally once a day (before a meal)  sertraline 50 mg oral tablet: 1 tab(s) orally once a day

## 2020-12-17 NOTE — PROGRESS NOTE ADULT - SUBJECTIVE AND OBJECTIVE BOX
RACHEL SUMMERS  85y  Female  ***My note supersedes ALL resident notes that I sign.  My corrections for their notes are in my note.***    I can be reached directly on [employer] [id number]. My office number is [phone]. My personal cell number is [phone].    INTERVAL EVENTS: Here for f/u of lt flank pain. Pt says she gets pain in left abd and left arm when her bladder fills and pain leaves after urination.  It is a strange symp, but she is consistent in her hx reporting.  She says sometimes there is pain w/ BM too, but that is more rare. Sometimes pain is not fully relieved w/ urination and she is now agreeable to low-dose morphine to tx this pain (which still is likely musc/skel and related to RA). Her SOB is much better and she is no longer on O2. She would like to go home tomorrow.    T(F): 96.8 (20 @ 05:02), Max: 98 (12-15-20 @ 20:36)  HR: 74 (20 @ 05:02) (66 - 82)  BP: 144/68 (20 @ 05:02) (144/68 - 167/86)  RR: 18 (20 @ 05:02) (18 - 19)  SpO2: 98% (12-15-20 @ 16:52) (98% - 99%)    Gen: NAD; very clear headed; off O2 (RA 98%)  HEENT: PERRL, EOMI, mouth clr, nose clr  Neck: no nodes, no JVD, thyroid nl  lungs: clr  hrt: s1 s2 rrr no murmur  abd: soft, NT/ND, no HS megaly  ext: no edema, no c/c  neuro: aa ox3, cn intact, can move all 4 ext    LABS:                      9.2     (    91.8   7.40  )-----------( ---------      229      ( 16 Dec 2020 05:21 )             30.1    (    14.0     135   (   100   (   85      12 @ 05:21  ----------------------               5.4   (   24   (   49                             -----                        3.0  Ca  8.3   Mg  1.7    P   --     PT/INR - ( 14 Dec 2020 18:20 )   PT: 13.10 sec;   INR: 1.14 ratio    PTT - ( 14 Dec 2020 18:20 )  PTT: 36.3 sec    CARDIAC MARKERS ( 15 Dec 2020 21:15 )  x     / <0.01 ng/mL / x     / x     / x      CARDIAC MARKERS ( 15 Dec 2020 06:30 )  x     / <0.01 ng/mL / x     / x     / x      CARDIAC MARKERS ( 14 Dec 2020 18:20 )  x     / <0.01 ng/mL / x     / x     / x        Urinalysis Basic - ( 15 Dec 2020 00:00 )    Color: Colorless / Appearance: Clear / S.009 / pH: x  Gluc: x / Ketone: Negative  / Bili: Negative / Urobili: <2 mg/dL   Blood: x / Protein: 30 mg/dL / Nitrite: Negative   Leuk Esterase: Negative / RBC: 1 /HPF / WBC 0 /HPF   Sq Epi: x / Non Sq Epi: 0 /HPF / Bacteria: Negative    Culture - Urine (collected 12-15-20 @ 00:00)  Source: .Urine Clean Catch (Midstream)  Final Report (20 @ 09:15):    <10,000 CFU/mL Normal Urogenital Rand    RADIOLOGY & ADDITIONAL TESTS:    MEDICATIONS:    acetaminophen   Tablet .. 650 milliGRAM(s) Oral every 6 hours  aluminum hydroxide/magnesium hydroxide/simethicone Suspension 30 milliLiter(s) Oral every 4 hours PRN  apixaban 2.5 milliGRAM(s) Oral every 12 hours  atorvastatin Oral Tab/Cap - Peds 40 milliGRAM(s) Oral daily  calcium acetate 667 milliGRAM(s) Oral four times a day with meals  chlorhexidine 4% Liquid 1 Application(s) Topical <User Schedule>  ferrous    sulfate 325 milliGRAM(s) Oral daily  folic acid 1 milliGRAM(s) Oral daily  furosemide    Tablet 20 milliGRAM(s) Oral daily  hydrALAZINE 25 milliGRAM(s) Oral three times a day  latanoprost 0.005% Ophthalmic Solution 1 Drop(s) Both EYES at bedtime  lidocaine   Patch 2 Patch Transdermal every 24 hours  magnesium oxide 400 milliGRAM(s) Oral two times a day with meals  methocarbamol 750 milliGRAM(s) Oral every 8 hours  metoprolol succinate ER 25 milliGRAM(s) Oral daily  morphine   Solution 2 milliGRAM(s) Oral every 8 hours PRN  pantoprazole    Tablet 40 milliGRAM(s) Oral before breakfast  sertraline 50 milliGRAM(s) Oral daily  
patient seen and examined and discussed with housestaff.    patient reports feeling well.  no SOB. no chest pain. no PND.  states that her renal doctor is Dr. Urrutia.  she does weigh herself regularly at home and reports 140 lbs as her good weight.    on P/e T 97 pulse 80 and regular.  /74 pulse ox 99% on RA.    on P/e conj pale no jaundice  neck pos. prominent venous pulsations at 45 deg. no bruits.  lungs are clear to auscultation  heart has regular rhythm.  chest wall has defibrillator no redness or swelling over the device.  extremities no edema.  changes consistent with RA both hands.  abd. soft nontender today.  no flank pain today.

## 2020-12-17 NOTE — DISCHARGE NOTE PROVIDER - NSDCFUADDINST_GEN_ALL_CORE_FT
Please weigh herself daily, please take your diuretics daily, and to call PMD if gain or lose more than 5 lbs in one day.    Please take all medications as prescribed

## 2020-12-17 NOTE — DISCHARGE NOTE PROVIDER - CARE PROVIDERS DIRECT ADDRESSES
,DirectAddress_Unknown,[email or web address],DirectAddress_Unknown,DirectAddress_Unknown

## 2020-12-17 NOTE — DISCHARGE NOTE PROVIDER - HOSPITAL COURSE
`HPI:  86 yo F with pmhx of CHF, s/p pacemaker, DVT (on Eloquis), RA, gout, HTN, CKD was sent in to the ED by PMD Dr. Celeste for evaluation of worsening dyspnea on exertion which was associated with Left calf pain for few days. She also complained of dysuria and left sided flank pain x 2 days which was localized, non radiating, moderate in intensity and worsened with movement. Dyspnea and lower extremity swelling is exacerbated by walking. She denied any hematuria, fever, chills, paresthesias, nausea, vomiting, headache, dizziness.    Vital Signs (24 Hrs):  T(C): 36.1 (12-15-20 @ 01:03), Max: 36.8 (12-14-20 @ 16:49)  HR: 86 (12-15-20 @ 01:03) (86 - 91)  BP: 170/76 (12-15-20 @ 01:03) (170/76 - 176/93)  RR: 18 (12-15-20 @ 01:03) (18 - 19)  SpO2: 99% (12-15-20 @ 01:03) (99% - 100%)    Patient was received hypertensive in ER. Blood work showed mild anaemia, hyperkalemia, elevated creatinine and reduced GFR (unchanged from last blood work). Troponin were negative, BNP was elevated >22k. UA and COVID-19 was negative. CT abdomen and pelvis and CT chest showed Bibasilar peripheral opacities likely on the basis of subsegmental atelectasis. Patient is being admitted for monitoring and r/o volume overload secondary to kidney dysfunction versus CHF.      Attd: pt has long standing RA and chr body aches, jacob of left arm and back; she has tried RA meds in past, but they do not agree w/ her body; pt does NOT like to take strong narcotics; she says lidoderm helps; she is agreeable to tylenol and a muscle relaxant; she also has gout; pain is worse w/ mvmt, so seems musculoskel; pt was c/o SOB, but does not seem too vol overload; she denies palpitations or CP; she is usually not on home O2; she lives in a house w/ her son and her dtr has a day care on the second floor; she does not want to go to SNF, but she would like home PT for her unsteady gait; she is [employer]; she can eat and drink; she is well oriented and knows her med hx fairly well  (15 Dec 2020 02:09)      # Left flank/back pain and left arm pain, likely related to her RA/GOUT/OA  - no dysuria now; U/A neg; no fever or WBC rise  - CT abdomen pelvis negative for cystitis/pyelo  - lidoderm patches (up to 2) on painful areas on back q24  - tylenol 650mg po q6 around the clock  - robaxin 750mg po q8 around the clock  - will try Morphine sulfate liquid 2 mg po Q8 prn  - no NSAIDs given CKD and CHF hx    # GARCIA with mild lower extremity swelling; + SEV Pulm HTN; chr sev LV dysfxn (non-isch); G2 baker dysfxn; mild MR; HTN (not well controlled); s/p BiVICD (hx V Tach)  - Trops -ve at admission, BNP >22k  - there is not much vol overload on exam or CT  - V Dupl: neg DVT  - no need to rpt echo (last 6/2020)  - lasix 20mg po q24 for now  - Recent nuclear stress test 06/2020 negative. Cath in 2014 was normal.   - incent nadine; OOBTC  - c/w toprol XL 25mg po q24  - start hydralazine 25mg po q8  - d/c Entresto (OMERO + hyperkalemia)  - no aldactone  - cardi - Dr Mckinney  - ICD interrogated. No events. LV lead failed to capture at high outputs, therefore it was turned off to save battery (does not need to be on for device to function)  Device functioning properly   - daily wts - do NOT over diurese     # OMERO (prob cardiorenal synd) on CKD 4 (base Cr 2.1); hyperkalemia  - d/c enteresto  - no renal eval for now  - no IVFs; oral hydration  - Cr 3.0, K 5.4, Mg 1.7. Will start oral Mg replacement (ptn not to be discharged on Mg)    # + signif GOUT and RA on exam, jacob hands  - pain meds as above  - outpt f/u w/ rhuem (Dr Sandy Jack)    # Hyperlipidemia  - c/w atorvastatin 40mg qd    # History of PE/DVT  - c/w eliquis 2.5 BID    # Glaucoma  - c/w latanaprost 0.005% solution

## 2020-12-17 NOTE — DISCHARGE NOTE PROVIDER - NSDCCPCAREPLAN_GEN_ALL_CORE_FT
PRINCIPAL DISCHARGE DIAGNOSIS  Diagnosis: CHF exacerbation  Assessment and Plan of Treatment:   Heart failure (HF) is a condition that does not allow your heart to fill or pump properly. Not enough oxygen in your blood gets to your organs and tissues. HF can occur in the right side, the left side, or both lower chambers of your heart. HF is often caused by damage or injury to your heart. The damage may be caused by heart attack, other heart conditions, or high blood pressure. HF is a long-term condition that tends to get worse over time. It is important to manage your health to improve your quality of life. HF can be worsened by heavy alcohol use, smoking, diabetes that is not controlled, or obesity.  Call 911 for:  Squeezing, pressure, or pain in your chest that lasts longer than 5 minutes or returns  Discomfort or pain in your back, neck, jaw, stomach, or arm  Trouble breathing  Nausea or vomiting  Lightheadedness or a sudden cold sweat, especially with chest pain or trouble breathing.  Seek care immediately if:  You gain 3 or more pounds (1.4 kg) in a day  Your heartbeat is fast, slow, or uneven all the time.  Do not smoke. Nicotine and other chemicals in cigarettes and cigars can cause lung damage and make HF difficult to manage.   Do not drink alcohol or take illegal drugs. . Weigh yourself every morning. Use the same scale, in the same spot. Do this after you use the bathroom, but before you eat or drink anything. Record your weight each day so you will notice any sudden weight gain. Swelling and weight gain are signs of fluid retention.Manage any chronic health conditions you have. These include high blood pressure, diabetes, obesity, high cholesterol, metabolic syndrome, and COPD. Stay active. If you are not active, your symptoms are likely to worsen quickly. Get a flu shot every year. You may also need the pneumonia vaccine. The flu and pneumonia can be severe for a person who has HF.      SECONDARY DISCHARGE DIAGNOSES  Diagnosis: Dyspnea on exertion  Assessment and Plan of Treatment:

## 2020-12-17 NOTE — DISCHARGE NOTE PROVIDER - CARE PROVIDER_API CALL
JOHNNA ASTORGA  [id number]  [address]  [address]OPEZA, Phys,    Phone: [phone]  Fax: ()-  Follow Up Time: 2 weeks    Sterling Mckinney)  Cardiovascular Disease; Internal Medicine  [address] [address]  Phone: [phone]  Fax: [phone]  Follow Up Time: 1 week    Sukhjinder Urrutia  NEPHROLOGY  [address] [place] [address]  Phone: [phone]  Fax: [phone]  Follow Up Time: 1 week    Wade Celeste  INTERNAL MEDICINE  [address] [place] [address]  Phone: [phone]  Fax: [phone]  Follow Up Time: 2 weeks

## 2020-12-17 NOTE — DISCHARGE NOTE PROVIDER - PROVIDER TOKENS
PROVIDER:[TOKEN:[[id number]],FOLLOWUP:[2 weeks]],PROVIDER:[TOKEN:[[id number]],FOLLOWUP:[1 week]],PROVIDER:[TOKEN:[[id number]],FOLLOWUP:[1 week]],PROVIDER:[TOKEN:[[id number]],FOLLOWUP:[2 weeks]]

## 2020-12-17 NOTE — PROGRESS NOTE ADULT - ASSESSMENT
84 yo F with pmhx of sys CHF, s/p BiVICD, DVT (on Eliquis), RA, gout, HTN, CKD was sent in to the ED by PMD Dr. Celeste for evaluation of worsening dyspnea on exertion, which was associated with Left calf pain for few days. She also complained of dysuria and left sided flank pain x 2 days, which was localized, non radiating, moderate in intensity and worsened with movement.     # Left flank/back pain and left arm pain - relieved w/ urination (usually), likely also related to her RA/GOUT/OA  no dysuria now; U/A neg; no fever or WBC rise  CT abdomen pelvis negative for cystitis/pyelo  lidoderm patches (up to 2) on painful areas on back q24  tylenol 650mg po q6 around the clock  robaxin 750mg po q8 around the clock  morphine liquid 2mg po q8 prn sev pain (dose reduced for CKD)  no NSAIDs given CKD and CHF hx    # GARCIA with mild lower extremity swelling; + SEV Pulm HTN; chr sev LV dysfxn (non-isch); G2 baker dysfxn; mild MR; HTN (not well controlled); s/p BiVICD (hx V Tach)  Trops -ve at admission, BNP >22k  there is not much vol overload on exam or CT  V Dupl: neg DVT  no need to rpt echo (last 6/2020)  c/w lasix 20mg po q24 for now  Recent nuclear stress test 06/2020 negative. Cath in 2014 was normal.   incent nadine; OOBTC  c/w toprol XL 25mg po q24  c/w hydralazine 25mg po q8 (can add nitrates as outpt later on)  d/c Entresto (OMERO + hyperkalemia)  no aldactone  cardio - spoke w/ Dr Rivera today - no further cardio intervention  EPS - Dr Nails: ICD working well; no events; change battery in May 2021    # OMERO (prob cardiorenal synd) on CKD 4 (base Cr 2.1); hyperkalemia; an chr dz  d/c enteresto  no renal eval for now  no IVFs; oral hydration  c/w sevelamer   c/w iron po  BMP q24    # hypomagnesemia  can replace orally  if cont to be an issue change PPI to pepcid 10mg po q24 (renal dose)    # + signif GOUT and RA on exam, jacob hands  Uric Acid 6.7 - no tx  pain meds as above  outpt f/u w/ rheum (Dr Sandy Jack)    # Hyperlipidemia  c/w atorvastatin 40mg qd    # History of PE/DVT  c/w eliquis 2.5 BID    # Glaucoma  c/w latanaprost 0.005% solution    # anx/dep  c/w zoloft 50mg po q24    # DVT ppx: on eliquis    # GI ppx: pantoprazole 40mg q24    # Activity: Ambulate as tolerated w/ asst; uses walker; PT eval (home PT)    # FULL CODE, Cheondoism    Dispo: tx pain; anticipate d/c home tomorrow (set up home PT)    
fluid overload - likely related to CKD and inadequate dose of diuretic, could also be consequence of pulmonary HTN vs. cause of pulmonary HTN - etiology of kidney disease and of cardiac disease not clear.  patient has severe decrease in EF but normal cardiac cath 2014.  u/a has minimal proteinuria suggesting that she has tubulo-interstitial disease as a possible cause.  today patient clinically improved and likely at good volume status for her.    other problems including h/o DVT, gout, RA, hyperlipidemia are controlled.      plan is DC today.  patient to f/u with PMD and cardiology as well as nephrology (Dr. Urrutia ) and rheumatology (Dr. Patterson)    patient counseled to weigh herself daily , to take diuretics daily, and to call PMD if gain or lose more than 5 lbs in one day.

## 2020-12-17 NOTE — DISCHARGE NOTE NURSING/CASE MANAGEMENT/SOCIAL WORK - PATIENT PORTAL LINK FT
You can access the FollowMyHealth Patient Portal offered by [facility] by registering at the following website: [email or web address]. By joining [employer]’s FollowMyHealth portal, you will also be able to view your health information using other applications (apps) compatible with our system.

## 2020-12-19 ENCOUNTER — EMERGENCY (EMERGENCY)
Facility: HOSPITAL | Age: 85
LOS: 0 days | Discharge: HOME | End: 2020-12-19
Attending: EMERGENCY MEDICINE | Admitting: EMERGENCY MEDICINE
Payer: MEDICARE

## 2020-12-19 VITALS
SYSTOLIC BLOOD PRESSURE: 158 MMHG | OXYGEN SATURATION: 97 % | TEMPERATURE: 98 F | HEART RATE: 87 BPM | WEIGHT: 139.99 LBS | RESPIRATION RATE: 20 BRPM | DIASTOLIC BLOOD PRESSURE: 94 MMHG | HEIGHT: 63 IN

## 2020-12-19 DIAGNOSIS — Z88.1 ALLERGY STATUS TO OTHER ANTIBIOTIC AGENTS STATUS: ICD-10-CM

## 2020-12-19 DIAGNOSIS — R06.02 SHORTNESS OF BREATH: ICD-10-CM

## 2020-12-19 DIAGNOSIS — Z95.0 PRESENCE OF CARDIAC PACEMAKER: Chronic | ICD-10-CM

## 2020-12-19 DIAGNOSIS — Z90.710 ACQUIRED ABSENCE OF BOTH CERVIX AND UTERUS: Chronic | ICD-10-CM

## 2020-12-19 DIAGNOSIS — Z90.89 ACQUIRED ABSENCE OF OTHER ORGANS: Chronic | ICD-10-CM

## 2020-12-19 DIAGNOSIS — R10.84 GENERALIZED ABDOMINAL PAIN: ICD-10-CM

## 2020-12-19 DIAGNOSIS — Z90.49 ACQUIRED ABSENCE OF OTHER SPECIFIED PARTS OF DIGESTIVE TRACT: Chronic | ICD-10-CM

## 2020-12-19 DIAGNOSIS — R07.9 CHEST PAIN, UNSPECIFIED: ICD-10-CM

## 2020-12-19 LAB
ALBUMIN SERPL ELPH-MCNC: 3.9 G/DL — SIGNIFICANT CHANGE UP (ref 3.5–5.2)
ALP SERPL-CCNC: 119 U/L — HIGH (ref 30–115)
ALT FLD-CCNC: 25 U/L — SIGNIFICANT CHANGE UP (ref 0–41)
ANION GAP SERPL CALC-SCNC: 10 MMOL/L — SIGNIFICANT CHANGE UP (ref 7–14)
APTT BLD: 37.5 SEC — SIGNIFICANT CHANGE UP (ref 27–39.2)
AST SERPL-CCNC: 59 U/L — HIGH (ref 0–41)
BASOPHILS # BLD AUTO: 0.06 K/UL — SIGNIFICANT CHANGE UP (ref 0–0.2)
BASOPHILS NFR BLD AUTO: 0.8 % — SIGNIFICANT CHANGE UP (ref 0–1)
BILIRUB DIRECT SERPL-MCNC: <0.2 MG/DL — SIGNIFICANT CHANGE UP (ref 0–0.2)
BILIRUB INDIRECT FLD-MCNC: SIGNIFICANT CHANGE UP MG/DL (ref 0.2–1.2)
BILIRUB SERPL-MCNC: <0.2 MG/DL — SIGNIFICANT CHANGE UP (ref 0.2–1.2)
BUN SERPL-MCNC: 44 MG/DL — HIGH (ref 10–20)
CALCIUM SERPL-MCNC: 9.1 MG/DL — SIGNIFICANT CHANGE UP (ref 8.5–10.1)
CHLORIDE SERPL-SCNC: 100 MMOL/L — SIGNIFICANT CHANGE UP (ref 98–110)
CO2 SERPL-SCNC: 26 MMOL/L — SIGNIFICANT CHANGE UP (ref 17–32)
CREAT SERPL-MCNC: 2.7 MG/DL — HIGH (ref 0.7–1.5)
EOSINOPHIL # BLD AUTO: 0.46 K/UL — SIGNIFICANT CHANGE UP (ref 0–0.7)
EOSINOPHIL NFR BLD AUTO: 6.4 % — SIGNIFICANT CHANGE UP (ref 0–8)
GLUCOSE SERPL-MCNC: 109 MG/DL — HIGH (ref 70–99)
HCT VFR BLD CALC: 33.7 % — LOW (ref 37–47)
HGB BLD-MCNC: 10.2 G/DL — LOW (ref 12–16)
IMM GRANULOCYTES NFR BLD AUTO: 0.3 % — SIGNIFICANT CHANGE UP (ref 0.1–0.3)
INR BLD: 1.16 RATIO — SIGNIFICANT CHANGE UP (ref 0.65–1.3)
LYMPHOCYTES # BLD AUTO: 1.9 K/UL — SIGNIFICANT CHANGE UP (ref 1.2–3.4)
LYMPHOCYTES # BLD AUTO: 26.4 % — SIGNIFICANT CHANGE UP (ref 20.5–51.1)
MCHC RBC-ENTMCNC: 28 PG — SIGNIFICANT CHANGE UP (ref 27–31)
MCHC RBC-ENTMCNC: 30.3 G/DL — LOW (ref 32–37)
MCV RBC AUTO: 92.6 FL — SIGNIFICANT CHANGE UP (ref 81–99)
MONOCYTES # BLD AUTO: 1.06 K/UL — HIGH (ref 0.1–0.6)
MONOCYTES NFR BLD AUTO: 14.7 % — HIGH (ref 1.7–9.3)
NEUTROPHILS # BLD AUTO: 3.71 K/UL — SIGNIFICANT CHANGE UP (ref 1.4–6.5)
NEUTROPHILS NFR BLD AUTO: 51.4 % — SIGNIFICANT CHANGE UP (ref 42.2–75.2)
NRBC # BLD: 0 /100 WBCS — SIGNIFICANT CHANGE UP (ref 0–0)
PLATELET # BLD AUTO: 256 K/UL — SIGNIFICANT CHANGE UP (ref 130–400)
POTASSIUM SERPL-MCNC: 5.4 MMOL/L — HIGH (ref 3.5–5)
POTASSIUM SERPL-SCNC: 5.4 MMOL/L — HIGH (ref 3.5–5)
PROT SERPL-MCNC: 7 G/DL — SIGNIFICANT CHANGE UP (ref 6–8)
PROTHROM AB SERPL-ACNC: 13.3 SEC — HIGH (ref 9.95–12.87)
RBC # BLD: 3.64 M/UL — LOW (ref 4.2–5.4)
RBC # FLD: 14 % — SIGNIFICANT CHANGE UP (ref 11.5–14.5)
SODIUM SERPL-SCNC: 136 MMOL/L — SIGNIFICANT CHANGE UP (ref 135–146)
TROPONIN T SERPL-MCNC: <0.01 NG/ML — SIGNIFICANT CHANGE UP
WBC # BLD: 7.21 K/UL — SIGNIFICANT CHANGE UP (ref 4.8–10.8)
WBC # FLD AUTO: 7.21 K/UL — SIGNIFICANT CHANGE UP (ref 4.8–10.8)

## 2020-12-19 PROCEDURE — 93010 ELECTROCARDIOGRAM REPORT: CPT | Mod: 76

## 2020-12-19 PROCEDURE — 99285 EMERGENCY DEPT VISIT HI MDM: CPT

## 2020-12-19 PROCEDURE — 74177 CT ABD & PELVIS W/CONTRAST: CPT | Mod: 26

## 2020-12-19 PROCEDURE — 71045 X-RAY EXAM CHEST 1 VIEW: CPT | Mod: 26

## 2020-12-19 PROCEDURE — 71275 CT ANGIOGRAPHY CHEST: CPT | Mod: 26

## 2020-12-19 RX ORDER — DIPHENHYDRAMINE HCL 50 MG
50 CAPSULE ORAL ONCE
Refills: 0 | Status: DISCONTINUED | OUTPATIENT
Start: 2020-12-19 | End: 2020-12-19

## 2020-12-19 RX ORDER — HYDRALAZINE HCL 50 MG
25 TABLET ORAL ONCE
Refills: 0 | Status: COMPLETED | OUTPATIENT
Start: 2020-12-19 | End: 2020-12-19

## 2020-12-19 RX ADMIN — Medication 25 MILLIGRAM(S): at 19:38

## 2020-12-19 NOTE — ED PROVIDER NOTE - CLINICAL SUMMARY MEDICAL DECISION MAKING FREE TEXT BOX
pt with history as above, recently dc-d, presenting for eval. per pt/family, miscommunications regarding picking up medications from pharmacy since discharge 2d ago. pt currently c/o mild sob, abdominal pain, chest pressure. neg nuke in june. Well appearing, NAD, non toxic. NCAT PERRLA EOMI neck supple non tender normal wob cta bl rrr abdomen s nt nd no rebound no guarding WWPx4 neuro non focal no LE edema. labs ekg imaging reviewed. pt feeling improved. normal o2 sat. dw pt and family. Aware of all results, given a copy of all available results, comfortable with discharge and follow-up outpatient, strict return precautions given. Endorses understanding of all of this and aware that they can return at any time for new or concerning symptoms. No further questions or concerns at this time

## 2020-12-19 NOTE — ED PROVIDER NOTE - OBJECTIVE STATEMENT
84 yo F with pmhx of CHF, s/p pacemaker, DVT (on Eloquis), RA, gout, HTN, CKD presenting to the ED c/o persistent SOB worsened by exertion, substernal non-radiating chest pain and poorly localized diffuse abdominal pain x 2 days. Pt was admitted for similar symptoms on 12/14 and discharged 12/17. Pt was prescribed Hydralazine upon discharge, as per daughter pt never informed family of new medication, never picked up from pharmacy, visiting nurse yesterday realized pt has not been compliant with all of her medications. Denies fever, chills, nausea, vomiting, diarrhea, constipation, flank pain, dysuria, hematuria, headache, palpitations, dizziness. 86 yo F with pmhx of CHF, s/p pacemaker, DVT (on Eliquis), RA, gout, HTN, CKD presenting to the ED c/o persistent SOB worsened by exertion, substernal non-radiating chest pain and poorly localized diffuse abdominal pain x 2 days. Pt was admitted for similar symptoms on 12/14 and discharged 12/17. Pt was prescribed Hydralazine upon discharge, as per daughter pt never informed family of new medication, never picked up from pharmacy, visiting nurse yesterday realized pt has not been compliant with all of her medications. Denies fever, chills, nausea, vomiting, diarrhea, constipation, flank pain, dysuria, hematuria, headache, palpitations, dizziness.

## 2020-12-19 NOTE — ED ADULT NURSE NOTE - CHIEF COMPLAINT QUOTE
Pt recently dc home from hospital, pt prescribed new meds and has not been taking the meds since dc due to miscommunication with family. Leona (daughter)

## 2020-12-19 NOTE — ED PROVIDER NOTE - PHYSICAL EXAMINATION
GENERAL: Well-nourished, Well-developed. NAD.  Eyes: PERRLA, EOMI. No asymmetry. No nystagmus. No conjunctival injection. Non-icteric sclera.  ENMT: MMM.   Neck: Supple. No cervical midline TTP. No paravertebral TTP to traps. FROM  CVS: No reproducible chest wall tenderness. Normal S1,S2. No murmurs appreciated on auscultation   RESP: No use of accessory muscles. Chest rise symmetrical with good expansion. Lungs clear to auscultation B/L. No wheezing, rales, or rhonchi auscultated.  GI: Normal auscultation of bowel sounds in all 4 quadrants. Mild diffuse TTP. Soft, Nondistended. No guarding or rebound tenderness. No CVAT B/L.  MSK: FROM of upper and lower extremities B/L. No midline spinal TTP. FROM of back with flexion and extension.  Skin: Warm, Dry. No rashes or lesions. Good cap refill < 2 sec B/L.  EXT: Radial and pedal pulses present B/L. No calf tenderness or swelling B/L. No palpable cords. No pedal edema B/L.  Neuro: AA&O x 3. Speaking in full sentences. Sensation grossly intact. Strength 5/5 B/L. No focal deficits.

## 2020-12-19 NOTE — ED PROVIDER NOTE - NS ED ROS FT
Constitutional: (-) fever (-) malaise (-) diaphoresis (-) chills  Eyes: (-) visual changes (-) eye pain   Neck: (-) neck pain (-) neck stiffness  Cardiac: (+) chest pain  (-) palpitations (-) syncope (-) edema  Respiratory: (-) cough (-) hemoptysis (+) SOB (-) GARCIA  GI: (-) nausea (-) vomiting (-) diarrhea (+) abdominal pain   : (-) dysuria (-) increased frequency  (-) hematuria (-) incontinence  MS: (-) back pain (-) myalgia (-) muscle weakness (-)  joint pain  Neuro: (-) headache (-) dizziness (-) numbness/tingling to extremities B/L (-) weakness   Skin: (-) rash (-) laceration    Except as documented in the HPI, all other systems are negative.

## 2020-12-19 NOTE — ED PROVIDER NOTE - PATIENT PORTAL LINK FT
You can access the FollowMyHealth Patient Portal offered by [facility] by registering at the following website: [email or web address]. By joining [employer]’s FollowMyHealth portal, you will also be able to view your health information using other applications (apps) compatible with our system.

## 2020-12-19 NOTE — ED PROVIDER NOTE - PROGRESS NOTE DETAILS
Discussed results with pt.  All questions were answered and return precautions discussed.  Pt is asx and comfortable at this time.  Unremarkable re-exam.  No further concerns at this time from pt.  Will follow up with PMD.  Pt understands and agrees with tx plan. I was directly involved in the management of this patient. Case was discussed with PA Fernie Saez. spoke w/ family, ok w/ d./c.  understands importance of medication compliance. Discussed results with pt.  All questions were answered and return precautions discussed.  Pt is asx and comfortable at this time.  Unremarkable re-exam.  No further concerns at this time from pt.  Will follow up with PMD and cardio.  Pt understands and agrees with tx plan. pt feels improved.  asx at this time.  ok with d/c.

## 2020-12-19 NOTE — ED PROVIDER NOTE - CARE PROVIDER_API CALL
Marian Barillas)  Cardiovascular Disease; Internal Medicine  [address] [place] [address]  Phone: [phone]  Fax: [phone]  Follow Up Time: 1-3 Days

## 2020-12-19 NOTE — ED ADULT NURSE NOTE - OBJECTIVE STATEMENT
Presented to ED c/o chest pain. Pt. recently d/[place] with meds and haven't been taking medication d/t miscommunication with family.

## 2020-12-19 NOTE — ED ADULT NURSE NOTE - CAS DISCH CONDITION
Chief Complaint   Patient presents with     RECHECK       Initial /70 (BP Location: Left arm, Patient Position: Chair, Cuff Size: Adult Large)  Pulse 74  Temp 97.9  F (36.6  C) (Temporal)  Resp 16  Wt 223 lb 1.6 oz (101.2 kg)  SpO2 97%  BMI 30.26 kg/m2 Estimated body mass index is 30.26 kg/(m^2) as calculated from the following:    Height as of 10/30/17: 6' (1.829 m).    Weight as of this encounter: 223 lb 1.6 oz (101.2 kg).  Medication Reconciliation: complete     Zoe Mo MA 1/8/2018  1:54 PM          
Stable

## 2020-12-19 NOTE — ED ADULT NURSE NOTE - NSIMPLEMENTINTERV_GEN_ALL_ED
Implemented All Fall with Harm Risk Interventions:  [place] to call system. Call bell, personal items and telephone within reach. Instruct patient to call for assistance. Room bathroom lighting operational. Non-slip footwear when patient is off stretcher. Physically safe environment: no spills, clutter or unnecessary equipment. Stretcher in lowest position, wheels locked, appropriate side rails in place. Provide visual cue, wrist band, yellow gown, etc. Monitor gait and stability. Monitor for mental status changes and reorient to person, place, and time. Review medications for side effects contributing to fall risk. Reinforce activity limits and safety measures with patient and family. Provide visual clues: red socks.

## 2020-12-19 NOTE — ED PROVIDER NOTE - NSFOLLOWUPINSTRUCTIONS_ED_ALL_ED_FT
Shortness of breath    Shortness of breath (dyspnea) means you have trouble breathing and could indicate a medical problem. Causes include lung disease, heart disease, low amount of red blood cells (anemia), poor physical fitness, being overweight, smoking, etc. Your health care provider today may not be able to find a cause for your shortness of breath after your exam. In this case, it is important to have a follow-up exam with your primary care physician as instructed. If medicines were prescribed, take them as directed for the full length of time directed. Refrain from tobacco products.    SEEK IMMEDIATE MEDICAL CARE IF YOU HAVE ANY OF THE FOLLOWING SYMPTOMS: worsening shortness of breath, chest pain, back pain, abdominal pain, fever, coughing up blood, lightheadedness/dizziness.    Abdominal Pain    Many things can cause abdominal pain. Many times, abdominal pain is not caused by a disease and will improve without treatment. Your health care provider will do a physical exam to determine if there is a dangerous cause of your pain; blood tests and imaging may help determine the cause of your pain. However, in many cases, no cause may be found and you may need further testing as an outpatient. Monitor your abdominal pain for any changes.     SEEK IMMEDIATE MEDICAL CARE IF YOU HAVE ANY OF THE FOLLOWING SYMPTOMS: worsening abdominal pain, uncontrollable vomiting, profuse diarrhea, inability to have bowel movements or pass gas, black or bloody stools, fever accompanying chest pain or back pain, or fainting. These symptoms may represent a serious problem that is an emergency. Do not wait to see if the symptoms will go away. Get medical help right away. Call 911 and do not drive yourself to the hospital.    Follow up with your primary medical doctor in 1-2 days

## 2020-12-24 ENCOUNTER — INPATIENT (INPATIENT)
Facility: HOSPITAL | Age: 85
LOS: 4 days | Discharge: HOME | End: 2020-12-29
Attending: INTERNAL MEDICINE | Admitting: INTERNAL MEDICINE
Payer: MEDICARE

## 2020-12-24 VITALS
SYSTOLIC BLOOD PRESSURE: 174 MMHG | WEIGHT: 143.08 LBS | DIASTOLIC BLOOD PRESSURE: 89 MMHG | RESPIRATION RATE: 18 BRPM | TEMPERATURE: 98 F | HEART RATE: 90 BPM | OXYGEN SATURATION: 97 % | HEIGHT: 63 IN

## 2020-12-24 DIAGNOSIS — Z90.710 ACQUIRED ABSENCE OF BOTH CERVIX AND UTERUS: Chronic | ICD-10-CM

## 2020-12-24 DIAGNOSIS — Z90.89 ACQUIRED ABSENCE OF OTHER ORGANS: Chronic | ICD-10-CM

## 2020-12-24 DIAGNOSIS — Z95.0 PRESENCE OF CARDIAC PACEMAKER: Chronic | ICD-10-CM

## 2020-12-24 DIAGNOSIS — Z90.49 ACQUIRED ABSENCE OF OTHER SPECIFIED PARTS OF DIGESTIVE TRACT: Chronic | ICD-10-CM

## 2020-12-24 PROCEDURE — 93010 ELECTROCARDIOGRAM REPORT: CPT

## 2020-12-24 PROCEDURE — 99285 EMERGENCY DEPT VISIT HI MDM: CPT

## 2020-12-24 NOTE — ED PROVIDER NOTE - ATTENDING CONTRIBUTION TO CARE
I personally evaluated the patient. I reviewed the Resident’s or Physician Assistant’s note (as assigned above), and agree with the findings and plan except as documented in my note.    Pt is a 86 y/o female with hx of HTN, CHF, s/p pacemaker, DVT (on Eliquis), RA, gout, CKD, presents for chest pain that started PTA, at rest, moderate, does not radiate. + mild SOB. No cough, fever, diaphoresis, abd pain, n/v.    Constitutional: Well developed, well nourished. NAD.  Head: Normocephalic, atraumatic.  Eyes: PERRL. EOMI.  ENT: No nasal discharge. Mucous membranes moist.  Neck: Supple. Painless ROM.  Cardiovascular: Normal S1, S2. Regular rate and rhythm. No murmurs, rubs, or gallops.  Pulmonary: Normal respiratory rate and effort. Lungs clear to auscultation bilaterally. No wheezing, rales, or rhonchi.  Abdominal: Soft. Nondistended. Nontender. No rebound, guarding, rigidity.  Extremities. Pelvis stable. No lower extremity edema, symmetric calves.  Skin: No rashes, cyanosis.  Neuro: AAOx3. No focal neurological deficits.  Psych: Normal mood. Normal affect.

## 2020-12-24 NOTE — ED PROVIDER NOTE - CARE PLAN
Principal Discharge DX:	CHF exacerbation  Secondary Diagnosis:	EKG, abnormal  Secondary Diagnosis:	OMERO (acute kidney injury)

## 2020-12-24 NOTE — ED PROVIDER NOTE - CLINICAL SUMMARY MEDICAL DECISION MAKING FREE TEXT BOX
PT presented to eD with chest pain. LAbs, imaging EKG obtained. + LBBB old, no ischemic changes. New OMERO. K baseline at 5.2, no ekg changes. Will admit to telem

## 2020-12-24 NOTE — ED PROVIDER NOTE - OBJECTIVE STATEMENT
pt with pmhx CHF, s/p pacemaker, DVT (on Eliquis), RA, gout, HTN, CKD in ED for CP/SOB that started this evening, similar to recent episode (d/c from hospital 5d ago). sees  for cards. pain is sharp, nonradiating, moderate. denies exacerbating or relieving factors. Denies fever/chill/HA/dizziness/abd pain/n/v/d/ black stool/bloody stool/urinary sxs

## 2020-12-24 NOTE — ED PROVIDER NOTE - PHYSICAL EXAMINATION
CONSTITUTIONAL: Well-appearing; well-nourished; in no apparent distress.   CARDIOVASCULAR: Normal S1, S2; no murmurs, rubs, or gallops.   RESPIRATORY: Normal chest excursion with respiration; breath sounds clear and equal bilaterally; no wheezes, rhonchi, or rales.  GI/: non-distended; non-tender; no palpable organomegaly.   SKIN: Normal for age and race; warm; dry; poor turgor; no apparent lesions or exudate.   NEURO/PSYCH: A & O x 4; grossly unremarkable.

## 2020-12-25 LAB
ALBUMIN SERPL ELPH-MCNC: 3.4 G/DL — LOW (ref 3.5–5.2)
ALBUMIN SERPL ELPH-MCNC: 3.5 G/DL — SIGNIFICANT CHANGE UP (ref 3.5–5.2)
ALP SERPL-CCNC: 139 U/L — HIGH (ref 30–115)
ALP SERPL-CCNC: 140 U/L — HIGH (ref 30–115)
ALT FLD-CCNC: 15 U/L — SIGNIFICANT CHANGE UP (ref 0–41)
ALT FLD-CCNC: 17 U/L — SIGNIFICANT CHANGE UP (ref 0–41)
ANION GAP SERPL CALC-SCNC: 13 MMOL/L — SIGNIFICANT CHANGE UP (ref 7–14)
ANION GAP SERPL CALC-SCNC: 13 MMOL/L — SIGNIFICANT CHANGE UP (ref 7–14)
APPEARANCE UR: CLEAR — SIGNIFICANT CHANGE UP
APTT BLD: 27.2 SEC — SIGNIFICANT CHANGE UP (ref 27–39.2)
AST SERPL-CCNC: 27 U/L — SIGNIFICANT CHANGE UP (ref 0–41)
AST SERPL-CCNC: 40 U/L — SIGNIFICANT CHANGE UP (ref 0–41)
BACTERIA # UR AUTO: NEGATIVE — SIGNIFICANT CHANGE UP
BASE EXCESS BLDV CALC-SCNC: 1.9 MMOL/L — SIGNIFICANT CHANGE UP (ref -2–2)
BASOPHILS # BLD AUTO: 0 K/UL — SIGNIFICANT CHANGE UP (ref 0–0.2)
BASOPHILS # BLD AUTO: 0.05 K/UL — SIGNIFICANT CHANGE UP (ref 0–0.2)
BASOPHILS NFR BLD AUTO: 0 % — SIGNIFICANT CHANGE UP (ref 0–1)
BASOPHILS NFR BLD AUTO: 0.6 % — SIGNIFICANT CHANGE UP (ref 0–1)
BILIRUB DIRECT SERPL-MCNC: <0.2 MG/DL — SIGNIFICANT CHANGE UP (ref 0–0.2)
BILIRUB INDIRECT FLD-MCNC: >0.1 MG/DL — LOW (ref 0.2–1.2)
BILIRUB SERPL-MCNC: 0.3 MG/DL — SIGNIFICANT CHANGE UP (ref 0.2–1.2)
BILIRUB SERPL-MCNC: 0.7 MG/DL — SIGNIFICANT CHANGE UP (ref 0.2–1.2)
BILIRUB UR-MCNC: NEGATIVE — SIGNIFICANT CHANGE UP
BLD GP AB SCN SERPL QL: SIGNIFICANT CHANGE UP
BUN SERPL-MCNC: 54 MG/DL — HIGH (ref 10–20)
BUN SERPL-MCNC: 55 MG/DL — HIGH (ref 10–20)
CA-I SERPL-SCNC: 1.21 MMOL/L — SIGNIFICANT CHANGE UP (ref 1.12–1.3)
CALCIUM SERPL-MCNC: 9.4 MG/DL — SIGNIFICANT CHANGE UP (ref 8.5–10.1)
CALCIUM SERPL-MCNC: 9.4 MG/DL — SIGNIFICANT CHANGE UP (ref 8.5–10.1)
CHLORIDE SERPL-SCNC: 96 MMOL/L — LOW (ref 98–110)
CHLORIDE SERPL-SCNC: 99 MMOL/L — SIGNIFICANT CHANGE UP (ref 98–110)
CHLORIDE UR-SCNC: 65 — SIGNIFICANT CHANGE UP
CK MB CFR SERPL CALC: 2.3 NG/ML — SIGNIFICANT CHANGE UP (ref 0.6–6.3)
CO2 SERPL-SCNC: 22 MMOL/L — SIGNIFICANT CHANGE UP (ref 17–32)
CO2 SERPL-SCNC: 22 MMOL/L — SIGNIFICANT CHANGE UP (ref 17–32)
COLOR SPEC: COLORLESS — SIGNIFICANT CHANGE UP
CREAT ?TM UR-MCNC: 51 MG/DL — SIGNIFICANT CHANGE UP
CREAT SERPL-MCNC: 5 MG/DL — CRITICAL HIGH (ref 0.7–1.5)
CREAT SERPL-MCNC: 5.1 MG/DL — CRITICAL HIGH (ref 0.7–1.5)
CRP SERPL-MCNC: 0.26 MG/DL — SIGNIFICANT CHANGE UP (ref 0–0.4)
DIFF PNL FLD: NEGATIVE — SIGNIFICANT CHANGE UP
EOSINOPHIL # BLD AUTO: 0.76 K/UL — HIGH (ref 0–0.7)
EOSINOPHIL # BLD AUTO: 1.03 K/UL — HIGH (ref 0–0.7)
EOSINOPHIL NFR BLD AUTO: 11.4 % — HIGH (ref 0–8)
EOSINOPHIL NFR BLD AUTO: 9.2 % — HIGH (ref 0–8)
EPI CELLS # UR: 1 /HPF — SIGNIFICANT CHANGE UP (ref 0–5)
ERYTHROCYTE [SEDIMENTATION RATE] IN BLOOD: 81 MM/HR — HIGH (ref 0–20)
GAS PNL BLDV: 134 MMOL/L — LOW (ref 136–145)
GAS PNL BLDV: SIGNIFICANT CHANGE UP
GIANT PLATELETS BLD QL SMEAR: PRESENT — SIGNIFICANT CHANGE UP
GLUCOSE SERPL-MCNC: 105 MG/DL — HIGH (ref 70–99)
GLUCOSE SERPL-MCNC: 92 MG/DL — SIGNIFICANT CHANGE UP (ref 70–99)
GLUCOSE UR QL: NEGATIVE — SIGNIFICANT CHANGE UP
HCO3 BLDV-SCNC: 26 MMOL/L — SIGNIFICANT CHANGE UP (ref 22–29)
HCT VFR BLD CALC: 31.6 % — LOW (ref 37–47)
HCT VFR BLD CALC: 31.9 % — LOW (ref 37–47)
HCT VFR BLDA CALC: 32 % — LOW (ref 34–44)
HGB BLD CALC-MCNC: 10.5 G/DL — LOW (ref 14–18)
HGB BLD-MCNC: 9.7 G/DL — LOW (ref 12–16)
HGB BLD-MCNC: 9.8 G/DL — LOW (ref 12–16)
HYALINE CASTS # UR AUTO: 1 /LPF — SIGNIFICANT CHANGE UP (ref 0–7)
IMM GRANULOCYTES NFR BLD AUTO: 0.4 % — HIGH (ref 0.1–0.3)
INR BLD: 1.21 RATIO — SIGNIFICANT CHANGE UP (ref 0.65–1.3)
KETONES UR-MCNC: NEGATIVE — SIGNIFICANT CHANGE UP
LACTATE BLDV-MCNC: 0.7 MMOL/L — SIGNIFICANT CHANGE UP (ref 0.5–1.6)
LEUKOCYTE ESTERASE UR-ACNC: NEGATIVE — SIGNIFICANT CHANGE UP
LYMPHOCYTES # BLD AUTO: 2.21 K/UL — SIGNIFICANT CHANGE UP (ref 1.2–3.4)
LYMPHOCYTES # BLD AUTO: 2.7 K/UL — SIGNIFICANT CHANGE UP (ref 1.2–3.4)
LYMPHOCYTES # BLD AUTO: 26.6 % — SIGNIFICANT CHANGE UP (ref 20.5–51.1)
LYMPHOCYTES # BLD AUTO: 29.8 % — SIGNIFICANT CHANGE UP (ref 20.5–51.1)
MAGNESIUM SERPL-MCNC: 2.9 MG/DL — HIGH (ref 1.8–2.4)
MANUAL SMEAR VERIFICATION: SIGNIFICANT CHANGE UP
MCHC RBC-ENTMCNC: 28 PG — SIGNIFICANT CHANGE UP (ref 27–31)
MCHC RBC-ENTMCNC: 28 PG — SIGNIFICANT CHANGE UP (ref 27–31)
MCHC RBC-ENTMCNC: 30.7 G/DL — LOW (ref 32–37)
MCHC RBC-ENTMCNC: 30.7 G/DL — LOW (ref 32–37)
MCV RBC AUTO: 91.1 FL — SIGNIFICANT CHANGE UP (ref 81–99)
MCV RBC AUTO: 91.1 FL — SIGNIFICANT CHANGE UP (ref 81–99)
MONOCYTES # BLD AUTO: 0.95 K/UL — HIGH (ref 0.1–0.6)
MONOCYTES # BLD AUTO: 1.34 K/UL — HIGH (ref 0.1–0.6)
MONOCYTES NFR BLD AUTO: 10.5 % — HIGH (ref 1.7–9.3)
MONOCYTES NFR BLD AUTO: 16.1 % — HIGH (ref 1.7–9.3)
NEUTROPHILS # BLD AUTO: 3.91 K/UL — SIGNIFICANT CHANGE UP (ref 1.4–6.5)
NEUTROPHILS # BLD AUTO: 4.38 K/UL — SIGNIFICANT CHANGE UP (ref 1.4–6.5)
NEUTROPHILS NFR BLD AUTO: 47.1 % — SIGNIFICANT CHANGE UP (ref 42.2–75.2)
NEUTROPHILS NFR BLD AUTO: 48.3 % — SIGNIFICANT CHANGE UP (ref 42.2–75.2)
NITRITE UR-MCNC: NEGATIVE — SIGNIFICANT CHANGE UP
NRBC # BLD: 0 /100 WBCS — SIGNIFICANT CHANGE UP (ref 0–0)
NT-PROBNP SERPL-SCNC: HIGH PG/ML (ref 0–300)
OSMOLALITY UR: 290 MOS/KG — SIGNIFICANT CHANGE UP (ref 50–1200)
PCO2 BLDV: 39 MMHG — LOW (ref 41–51)
PH BLDV: 7.44 — HIGH (ref 7.26–7.43)
PH UR: 8 — SIGNIFICANT CHANGE UP (ref 5–8)
PHOSPHATE SERPL-MCNC: 4.3 MG/DL — SIGNIFICANT CHANGE UP (ref 2.1–4.9)
PLAT MORPH BLD: NORMAL — SIGNIFICANT CHANGE UP
PLATELET # BLD AUTO: 264 K/UL — SIGNIFICANT CHANGE UP (ref 130–400)
PLATELET # BLD AUTO: 267 K/UL — SIGNIFICANT CHANGE UP (ref 130–400)
PO2 BLDV: 40 MMHG — SIGNIFICANT CHANGE UP (ref 20–40)
POLYCHROMASIA BLD QL SMEAR: SIGNIFICANT CHANGE UP
POTASSIUM BLDV-SCNC: 5.2 MMOL/L — SIGNIFICANT CHANGE UP (ref 3.3–5.6)
POTASSIUM SERPL-MCNC: 5.2 MMOL/L — HIGH (ref 3.5–5)
POTASSIUM SERPL-MCNC: 5.9 MMOL/L — HIGH (ref 3.5–5)
POTASSIUM SERPL-SCNC: 5.2 MMOL/L — HIGH (ref 3.5–5)
POTASSIUM SERPL-SCNC: 5.9 MMOL/L — HIGH (ref 3.5–5)
POTASSIUM UR-SCNC: 29 MMOL/L — SIGNIFICANT CHANGE UP
PROT ?TM UR-MCNC: 70 MG/DLG/24H — SIGNIFICANT CHANGE UP
PROT SERPL-MCNC: 6.6 G/DL — SIGNIFICANT CHANGE UP (ref 6–8)
PROT SERPL-MCNC: 6.9 G/DL — SIGNIFICANT CHANGE UP (ref 6–8)
PROT UR-MCNC: ABNORMAL
PROT/CREAT UR-RTO: 1.4 RATIO — HIGH (ref 0–0.2)
PROTHROM AB SERPL-ACNC: 13.9 SEC — HIGH (ref 9.95–12.87)
RBC # BLD: 3.47 M/UL — LOW (ref 4.2–5.4)
RBC # BLD: 3.5 M/UL — LOW (ref 4.2–5.4)
RBC # FLD: 14.1 % — SIGNIFICANT CHANGE UP (ref 11.5–14.5)
RBC # FLD: 14.2 % — SIGNIFICANT CHANGE UP (ref 11.5–14.5)
RBC BLD AUTO: ABNORMAL
RBC CASTS # UR COMP ASSIST: 1 /HPF — SIGNIFICANT CHANGE UP (ref 0–4)
RHEUMATOID FACT SERPL-ACNC: 14 IU/ML — HIGH (ref 0–13)
SAO2 % BLDV: 77 % — SIGNIFICANT CHANGE UP
SARS-COV-2 RNA SPEC QL NAA+PROBE: SIGNIFICANT CHANGE UP
SODIUM SERPL-SCNC: 131 MMOL/L — LOW (ref 135–146)
SODIUM SERPL-SCNC: 134 MMOL/L — LOW (ref 135–146)
SODIUM UR-SCNC: 70 MMOL/L — SIGNIFICANT CHANGE UP
SP GR SPEC: 1.01 — SIGNIFICANT CHANGE UP (ref 1.01–1.03)
TROPONIN T SERPL-MCNC: 0.01 NG/ML — SIGNIFICANT CHANGE UP
TROPONIN T SERPL-MCNC: 0.04 NG/ML — CRITICAL HIGH
TROPONIN T SERPL-MCNC: 0.04 NG/ML — CRITICAL HIGH
TSH SERPL-MCNC: 1.41 UIU/ML — SIGNIFICANT CHANGE UP (ref 0.27–4.2)
UROBILINOGEN FLD QL: SIGNIFICANT CHANGE UP
UUN UR-MCNC: 242 MG/DL — SIGNIFICANT CHANGE UP
WBC # BLD: 8.3 K/UL — SIGNIFICANT CHANGE UP (ref 4.8–10.8)
WBC # BLD: 9.07 K/UL — SIGNIFICANT CHANGE UP (ref 4.8–10.8)
WBC # FLD AUTO: 8.3 K/UL — SIGNIFICANT CHANGE UP (ref 4.8–10.8)
WBC # FLD AUTO: 9.07 K/UL — SIGNIFICANT CHANGE UP (ref 4.8–10.8)
WBC UR QL: 1 /HPF — SIGNIFICANT CHANGE UP (ref 0–5)

## 2020-12-25 PROCEDURE — 71045 X-RAY EXAM CHEST 1 VIEW: CPT | Mod: 26

## 2020-12-25 PROCEDURE — 99223 1ST HOSP IP/OBS HIGH 75: CPT

## 2020-12-25 PROCEDURE — 99222 1ST HOSP IP/OBS MODERATE 55: CPT

## 2020-12-25 PROCEDURE — 76770 US EXAM ABDO BACK WALL COMP: CPT | Mod: 26

## 2020-12-25 PROCEDURE — 93306 TTE W/DOPPLER COMPLETE: CPT | Mod: 26

## 2020-12-25 RX ORDER — LIDOCAINE 4 G/100G
1 CREAM TOPICAL ONCE
Refills: 0 | Status: COMPLETED | OUTPATIENT
Start: 2020-12-25 | End: 2020-12-25

## 2020-12-25 RX ORDER — SERTRALINE 25 MG/1
50 TABLET, FILM COATED ORAL DAILY
Refills: 0 | Status: DISCONTINUED | OUTPATIENT
Start: 2020-12-25 | End: 2020-12-29

## 2020-12-25 RX ORDER — ACETAMINOPHEN 500 MG
650 TABLET ORAL EVERY 6 HOURS
Refills: 0 | Status: DISCONTINUED | OUTPATIENT
Start: 2020-12-25 | End: 2020-12-29

## 2020-12-25 RX ORDER — PANTOPRAZOLE SODIUM 20 MG/1
40 TABLET, DELAYED RELEASE ORAL
Refills: 0 | Status: DISCONTINUED | OUTPATIENT
Start: 2020-12-25 | End: 2020-12-29

## 2020-12-25 RX ORDER — HYDRALAZINE HCL 50 MG
25 TABLET ORAL THREE TIMES A DAY
Refills: 0 | Status: DISCONTINUED | OUTPATIENT
Start: 2020-12-25 | End: 2020-12-29

## 2020-12-25 RX ORDER — LATANOPROST 0.05 MG/ML
1 SOLUTION/ DROPS OPHTHALMIC; TOPICAL AT BEDTIME
Refills: 0 | Status: DISCONTINUED | OUTPATIENT
Start: 2020-12-25 | End: 2020-12-29

## 2020-12-25 RX ORDER — ACETAMINOPHEN 500 MG
975 TABLET ORAL ONCE
Refills: 0 | Status: COMPLETED | OUTPATIENT
Start: 2020-12-25 | End: 2020-12-25

## 2020-12-25 RX ORDER — FUROSEMIDE 40 MG
40 TABLET ORAL ONCE
Refills: 0 | Status: COMPLETED | OUTPATIENT
Start: 2020-12-25 | End: 2020-12-25

## 2020-12-25 RX ORDER — FERROUS SULFATE 325(65) MG
325 TABLET ORAL DAILY
Refills: 0 | Status: DISCONTINUED | OUTPATIENT
Start: 2020-12-25 | End: 2020-12-29

## 2020-12-25 RX ORDER — FUROSEMIDE 40 MG
40 TABLET ORAL EVERY 24 HOURS
Refills: 0 | Status: DISCONTINUED | OUTPATIENT
Start: 2020-12-25 | End: 2020-12-29

## 2020-12-25 RX ORDER — METOPROLOL TARTRATE 50 MG
25 TABLET ORAL DAILY
Refills: 0 | Status: DISCONTINUED | OUTPATIENT
Start: 2020-12-25 | End: 2020-12-29

## 2020-12-25 RX ORDER — FOLIC ACID 0.8 MG
1 TABLET ORAL DAILY
Refills: 0 | Status: DISCONTINUED | OUTPATIENT
Start: 2020-12-25 | End: 2020-12-29

## 2020-12-25 RX ORDER — SENNA PLUS 8.6 MG/1
2 TABLET ORAL AT BEDTIME
Refills: 0 | Status: DISCONTINUED | OUTPATIENT
Start: 2020-12-25 | End: 2020-12-29

## 2020-12-25 RX ORDER — ISOSORBIDE DINITRATE 5 MG/1
10 TABLET ORAL THREE TIMES A DAY
Refills: 0 | Status: DISCONTINUED | OUTPATIENT
Start: 2020-12-25 | End: 2020-12-29

## 2020-12-25 RX ORDER — CALCIUM ACETATE 667 MG
667 TABLET ORAL
Refills: 0 | Status: DISCONTINUED | OUTPATIENT
Start: 2020-12-25 | End: 2020-12-29

## 2020-12-25 RX ORDER — ATORVASTATIN CALCIUM 80 MG/1
40 TABLET, FILM COATED ORAL AT BEDTIME
Refills: 0 | Status: DISCONTINUED | OUTPATIENT
Start: 2020-12-25 | End: 2020-12-29

## 2020-12-25 RX ORDER — APIXABAN 2.5 MG/1
2.5 TABLET, FILM COATED ORAL
Refills: 0 | Status: DISCONTINUED | OUTPATIENT
Start: 2020-12-25 | End: 2020-12-29

## 2020-12-25 RX ADMIN — Medication 650 MILLIGRAM(S): at 18:23

## 2020-12-25 RX ADMIN — Medication 25 MILLIGRAM(S): at 16:08

## 2020-12-25 RX ADMIN — Medication 650 MILLIGRAM(S): at 12:03

## 2020-12-25 RX ADMIN — LIDOCAINE 1 PATCH: 4 CREAM TOPICAL at 23:48

## 2020-12-25 RX ADMIN — Medication 325 MILLIGRAM(S): at 11:36

## 2020-12-25 RX ADMIN — Medication 650 MILLIGRAM(S): at 23:51

## 2020-12-25 RX ADMIN — APIXABAN 2.5 MILLIGRAM(S): 2.5 TABLET, FILM COATED ORAL at 18:23

## 2020-12-25 RX ADMIN — LATANOPROST 1 DROP(S): 0.05 SOLUTION/ DROPS OPHTHALMIC; TOPICAL at 23:01

## 2020-12-25 RX ADMIN — SENNA PLUS 2 TABLET(S): 8.6 TABLET ORAL at 22:20

## 2020-12-25 RX ADMIN — Medication 25 MILLIGRAM(S): at 06:30

## 2020-12-25 RX ADMIN — Medication 40 MILLIGRAM(S): at 16:08

## 2020-12-25 RX ADMIN — Medication 667 MILLIGRAM(S): at 11:36

## 2020-12-25 RX ADMIN — PANTOPRAZOLE SODIUM 40 MILLIGRAM(S): 20 TABLET, DELAYED RELEASE ORAL at 06:31

## 2020-12-25 RX ADMIN — Medication 25 MILLIGRAM(S): at 06:31

## 2020-12-25 RX ADMIN — SERTRALINE 50 MILLIGRAM(S): 25 TABLET, FILM COATED ORAL at 11:35

## 2020-12-25 RX ADMIN — Medication 1 MILLIGRAM(S): at 11:35

## 2020-12-25 RX ADMIN — APIXABAN 2.5 MILLIGRAM(S): 2.5 TABLET, FILM COATED ORAL at 06:31

## 2020-12-25 RX ADMIN — Medication 40 MILLIGRAM(S): at 01:37

## 2020-12-25 RX ADMIN — Medication 975 MILLIGRAM(S): at 03:50

## 2020-12-25 RX ADMIN — Medication 650 MILLIGRAM(S): at 18:47

## 2020-12-25 RX ADMIN — ATORVASTATIN CALCIUM 40 MILLIGRAM(S): 80 TABLET, FILM COATED ORAL at 22:20

## 2020-12-25 RX ADMIN — ISOSORBIDE DINITRATE 10 MILLIGRAM(S): 5 TABLET ORAL at 22:20

## 2020-12-25 RX ADMIN — Medication 25 MILLIGRAM(S): at 22:20

## 2020-12-25 RX ADMIN — Medication 650 MILLIGRAM(S): at 11:35

## 2020-12-25 NOTE — H&P ADULT - ATTENDING COMMENTS
I saw and evaluated the patient. I have reviewed and agree with the findings and plan of care as documented above in the resident’s note (unless indicated differently below). Any necessary changes were made in the body of the text.    CC: chest pain    HPI:  84 yo F pt w/ a relevant hx of HFrEF (non-ischemic CMP w/ LVEF of 20% s/p CRT-D), CKD 4 and severe pulmonary HTN (likely group 2). P/w chest pain. Onset x 1 day. Course: intermittent waxing and waning. Location: lower chest wall along the supra-epigastric area and towards the rt. Quality: sharp. Radiation: none. Intensity: moderate. Precipitating factors: none. Aggravating factors: none. Alleviating factors: none. Associated w/ SOB (during episodes of chest discomfort), dyspepsia and lower back pain (left sided, lower back, achy, non-radiating, precipitated & exacerbated by lying supine on stretcher). Of note, pt came on 12/19/2020 w/ dyspnea, underwent a spiral CT, was ruled out for PE & then ED discharged.    ROS:  Constitutional: no fevers; +generalized weakness  Eyes: no vision changes  ENT: no dysphagia; no sore throat  CVS: +orthopnea; +chest pain  Resp: +SOB; no coughing  GI: no vomiting; no diarrhea; +intermittent abd discomfort; +anorexia; +dyspepsia  : no dysuria; no hematuria  MSK: +back pain (left sided)  Skin: no rashes  Neuro: no headache  All other systems reviewed and are negative    PMHx, home medications, SurHx, FHx and Social history as above in the corresponding sections of the note - reviewed and edited where appropriate    Exam:  Vitals: BP = 174/89; P = 90; T = 98.4; RR = 18; SpO2 > 95 on room air  General: appears stated age; cooperative; appears uncomfortable due to discomfort  Eyes: anicteric sclera; moist conjunctiva; PERRL; EOMI  HENT: NC/AT; clear oropharynx; MMM; nL hard/soft palate  Neck: supple w/ FROM; trachea midline  Lungs: no tachypnea; no accessory muscle use; no wheezing; no rhonci; +mild rales at the lung bases  CVS: RRR; S1 and S2 w/o MRGs; JV collapses w/ deep breathing  Abd: BS+; soft; non-tender to palpation x 4; no masses or HSM  Ext: non-edematous lower extremities; pulses palpable distally  Skin: warm; dry; atrophic; no evident breakdown; no pallor; no jaundice; no erythema  Neuro: CN II-XII intact; able to move all extremities; constitutional weakness  Psych: appropriate affect; alert and oriented to person, place and situation    Labs reviewed and are significant for H&H 9.7/31.6, Na 131 (h), K 5.9 (h), BUN/Cr 55/5.1, alk phos 140, trop 0.04, proBNP 34,712  Imaging reviewed: CXR w/ no consolidations, effusions or PTX noted (left sided pacing device in place)  EKG reviewed: NSR; non-specific intra-ventricular conduction block  TTE (06/2020): LVEF of 20 to 25% w/ grade 2 diast dysfunc, mod TR, ePA syst pres = 63.7    Assessment:  (1) Chest pain suspect 2/2 gastritis (GI etiology); r/o ACS  (2) GARCIA 2/2 acute on chronic HFrEF and grade 2 diastolic dysfunction (s/p CRT-D)  (3) GARCIA also 2/2 severe pulm hypertension (likely group 2 2/2 left heart disease)  (4) Back pain & generalized aches/pains 2/2 underlying osteo & rheum arthritis  (5) Acute renal failure on underlying CKD4 - suspect ATN / contrast nephropathy  (6) Mildly elevated cardiac enzymes - decreased clearance & demand 2/2 CHF  (7) Hyperkalemia 2/2 OMERO (spurious component as BMP hemolyzed)  (8) Normocytic anemia: AOCD + recent hx of upper GI hemorrhage [stable H&H]  (9) Hx of gout - chronic  (10) Hx of PE/DVT - on apixaban  (11) HTN - suboptimal control 2/2 med non-compliance & pain  (12) Glaucoma - chronic  (13) Anxiety and depression - stable mood at this time    Plan:  (1) Chest pain: tele; serial enzymes; EKG's; PRN analgesics; PPI daily  (2) For CHF: strict I&O; daily wts; diuretics as ordered; Cardio eval  (3) For OMERO: track UOP; BUN/Cr qD; avoid nephrotoxins; dose meds renally; U/A    Full code I saw and evaluated the patient. I have reviewed and agree with the findings and plan of care as documented above in the resident’s note (unless indicated differently below). Any necessary changes were made in the body of the text.    CC: chest pain    HPI:  86 yo F pt w/ a relevant hx of HFrEF (non-ischemic CMP w/ LVEF of 20% s/p CRT-D), CKD 4 and severe pulmonary HTN (likely group 2). P/w chest pain. Onset x 1 day. Course: intermittent waxing and waning. Location: lower chest wall along the supra-epigastric area and towards the rt. Quality: sharp. Radiation: none. Intensity: moderate. Precipitating factors: none. Aggravating factors: none. Alleviating factors: none. Associated w/ SOB (during episodes of chest discomfort), dyspepsia and lower back pain (left sided, lower back, achy, non-radiating, precipitated & exacerbated by lying supine on stretcher). Of note, pt came on 12/19/2020 w/ dyspnea, underwent a spiral CT, was ruled out for PE & then ED discharged.    ROS:  Constitutional: no fevers; +generalized weakness  Eyes: no vision changes  ENT: no dysphagia; no sore throat  CVS: +orthopnea; +chest pain  Resp: +SOB; no coughing  GI: no vomiting; no diarrhea; +intermittent abd discomfort; +anorexia; +dyspepsia  : no dysuria; no hematuria  MSK: +back pain (left sided)  Skin: no rashes  Neuro: no headache  All other systems reviewed and are negative    PMHx, home medications, SurHx, FHx and Social history as above in the corresponding sections of the note - reviewed and edited where appropriate    Exam:  Vitals: BP = 174/89; P = 90; T = 98.4; RR = 18; SpO2 > 95 on room air  General: appears stated age; cooperative; appears uncomfortable due to discomfort  Eyes: anicteric sclera; moist conjunctiva; PERRL; EOMI  HENT: NC/AT; clear oropharynx; MMM; nL hard/soft palate  Neck: supple w/ FROM; trachea midline  Lungs: no tachypnea; no accessory muscle use; no wheezing; no rhonci; +mild rales at the lung bases  CVS: RRR; S1 and S2 w/o MRGs; JV collapses w/ deep breathing  Abd: BS+; soft; non-tender to palpation x 4; no masses or HSM  Ext: non-edematous lower extremities; pulses palpable distally  Skin: warm; dry; atrophic; no evident breakdown; no pallor; no jaundice; no erythema  Neuro: CN II-XII intact; able to move all extremities; constitutional weakness  Psych: appropriate affect; alert and oriented to person, place and situation    Labs reviewed and are significant for H&H 9.7/31.6, Na 131 (h), K 5.9 (h), BUN/Cr 55/5.1, alk phos 140, trop 0.04, proBNP 34,712  Imaging reviewed: CXR w/ no consolidations, effusions or PTX noted (left sided pacing device in place)  EKG reviewed: NSR; non-specific intra-ventricular conduction block  TTE (06/2020): LVEF of 20 to 25% w/ grade 2 diast dysfunc, mod TR, ePA syst pres = 63.7    Assessment:  (1) Chest pain suspect 2/2 gastritis (GI etiology); r/o ACS  (2) GARCIA 2/2 acute on chronic HFrEF and grade 2 diastolic dysfunction (s/p CRT-D)  (3) GARCIA also 2/2 severe pulm hypertension (likely group 2 2/2 left heart disease)  (4) Back pain & generalized aches/pains 2/2 underlying osteo & rheum arthritis  (5) Acute renal failure on underlying CKD4 - suspect ATN / contrast nephropathy  (6) Mildly elevated cardiac enzymes - decreased clearance & demand 2/2 CHF  (7) Hyperkalemia 2/2 OMERO (spurious component as BMP hemolyzed)  (8) Normocytic anemia: AOCD + recent hx of upper GI hemorrhage [stable H&H]  (9) Hx of gout - chronic  (10) Hx of PE/DVT - on apixaban  (11) HTN - suboptimal control 2/2 med non-compliance & pain  (12) Glaucoma - chronic  (13) Anxiety and depression - stable mood at this time    Plan:  (1) Chest pain: tele; serial enzymes; EKG's; PRN analgesics; PPI daily  (2) For CHF: strict I&O; daily wts; diuretics as ordered; Cardio eval  (3) For OMERO: track UOP; BUN/Cr daily; avoid nephrotoxins; dose renally; U/A  (4) Repeat BMP; if K > 5.5, start lokelma 10 daily  (5) Daily H&H; monitor for bleeding diathesis  (6) C/w apixaban at 2.5 mg bid  (7) Anti-hypertensives as dosed  (8) Rest of medications as dosed - reviewed and edited where appropriate  (9) Supportive care: PRN analgesics, antiemetics, antitussives, antipyretics  (10) Dispo: not d/c ready  --- Pending an improvement of her renal function & Nephro eval/recs  --- Pending correction of electrolyte abnormalities  --- Pending clinical improvement: resolution of chest pain and acute CHF  --- Anticipate need for 48-72 hrs of in-pt care    Full code I saw and evaluated the patient. I have reviewed and agree with the findings and plan of care as documented above in the resident’s note (unless indicated differently below). Any necessary changes were made in the body of the text.    CC: chest pain    HPI:  86 yo F pt w/ a relevant hx of HFrEF (non-ischemic CMP w/ LVEF of 20% s/p CRT-D), CKD 4 and severe pulmonary HTN (likely group 2). P/w chest pain. Onset x 1 day. Course: intermittent waxing and waning. Location: lower chest wall along the supra-epigastric area and towards the rt. Quality: sharp. Radiation: none. Intensity: moderate. Precipitating factors: none. Aggravating factors: none. Alleviating factors: none. Associated w/ SOB (during episodes of chest discomfort), dyspepsia and lower back pain (left sided, lower back, achy, non-radiating, precipitated & exacerbated by lying supine on stretcher). Of note, pt came on 12/19/2020 w/ dyspnea, underwent a spiral CT, was ruled out for PE & then ED discharged.    ROS:  Constitutional: no fevers; +generalized weakness  Eyes: no vision changes  ENT: no dysphagia; no sore throat  CVS: +orthopnea; +chest pain  Resp: +SOB; no coughing  GI: no vomiting; no diarrhea; +intermittent abd discomfort; +anorexia; +dyspepsia  : no dysuria; no hematuria  MSK: +back pain (left sided)  Skin: no rashes  Neuro: no headache  All other systems reviewed and are negative    PMHx, home medications, SurHx, FHx and Social history as above in the corresponding sections of the note - reviewed and edited where appropriate    Exam:  Vitals: BP = 174/89; P = 90; T = 98.4; RR = 18; SpO2 > 95 on room air  General: appears stated age; cooperative; appears uncomfortable due to discomfort  Eyes: anicteric sclera; moist conjunctiva; PERRL; EOMI  HENT: NC/AT; clear oropharynx; MMM; nL hard/soft palate  Neck: supple w/ FROM; trachea midline  Lungs: no tachypnea; no accessory muscle use; no wheezing; no rhonci; +mild rales at the lung bases  CVS: RRR; S1 and S2 w/o MRGs; JV collapses w/ deep breathing  Abd: BS+; soft; non-tender to palpation x 4; no masses or HSM  Ext: non-edematous lower extremities; pulses palpable distally  Skin: warm; dry; atrophic; no evident breakdown; no pallor; no jaundice; no erythema  Neuro: CN II-XII intact; able to move all extremities; constitutional weakness  Psych: appropriate affect; alert and oriented to person, place and situation    Labs reviewed and are significant for H&H 9.7/31.6, Na 131 (h), K 5.9 (h), BUN/Cr 55/5.1, alk phos 140, trop 0.04, proBNP 34,712  Imaging reviewed: CXR w/ no consolidations, effusions or PTX noted (left sided pacing device in place)  EKG reviewed: NSR; non-specific intra-ventricular conduction block  TTE (06/2020): LVEF of 20 to 25% w/ grade 2 diast dysfunc, mod TR, ePA syst pres = 63.7    Assessment:  (1) Chest pain suspect 2/2 gastritis (GI etiology); r/o ACS  (2) GARCIA 2/2 acute on chronic HFrEF and grade 2 diastolic dysfunction (s/p CRT-D)  (3) GARCIA also 2/2 severe pulm hypertension (likely group 2 2/2 left heart disease)  (4) Back pain & generalized aches/pains 2/2 underlying osteo & rheum arthritis  (5) Acute renal failure on underlying CKD4 - likely cardiorenal possible ATN / VERONICA  (6) Mildly elevated cardiac enzymes - decreased clearance & demand 2/2 CHF  (7) Hyperkalemia 2/2 OMERO (spurious component as BMP hemolyzed)  (8) Normocytic anemia: AOCD + recent hx of upper GI hemorrhage [stable H&H]  (9) Hx of gout - chronic  (10) Hx of PE/DVT - on apixaban  (11) HTN - suboptimal control 2/2 med non-compliance & pain  (12) Glaucoma - chronic  (13) Anxiety and depression - stable mood at this time    Plan:  (1) Chest pain: tele; serial enzymes; EKG's; PRN analgesics; PPI daily  (2) For CHF: strict I&O; daily wts; diuretics as ordered; Cardio eval  (3) For OMERO: track UOP; BUN/Cr daily; avoid nephrotoxins; dose renally; U/A  (4) Repeat BMP; if K > 5.5, start lokelma 10 daily  (5) Daily H&H; monitor for bleeding diathesis  (6) C/w apixaban at 2.5 mg bid  (7) Anti-hypertensives as dosed  (8) Rest of medications as dosed - reviewed and edited where appropriate  (9) Supportive care: PRN analgesics, antiemetics, antitussives, antipyretics  (10) Dispo: not d/c ready  --- Pending an improvement of her renal function & Nephro eval/recs  --- Pending correction of electrolyte abnormalities  --- Pending clinical improvement: resolution of chest pain and acute CHF  --- Anticipate need for 48-72 hrs of in-pt care    Full code

## 2020-12-25 NOTE — ED ADULT NURSE NOTE - OBJECTIVE STATEMENT
Pt is a 85y Female with c/o of chest pain. Pt states pain started earlier this morning and started becoming worse throughout the day. Pt also complains of SOB with the chest pain.  Pt states pain is dull non radiating. PT denies any fever/chills.

## 2020-12-25 NOTE — CONSULT NOTE ADULT - ASSESSMENT
85 year old female (Episcopal) with history of non-ischemic cardiomyopathy with EF of 20% s/p CRT-D (LV lead de-activated during last admission), severe pulm htn, RA, HTN, CKD, chronic DVT / PE, presents for worsening shortness of breath and chest pain    # Chest pain  - Atypical chest pain; now significantly improved  - EKG with LBBB, unchanged  - ACS less likely at this time  - Admit to telemetry  - Trend cardiac enzymes and check serial EKGs    # Shortness of breath  - Multifactorial  - Patient doesn't look in fluid overload clinically  - Would hold IV diuretics for now    # OMERO  - Received contrast for CT scan during previous admission  - Nephrology evaluation   85 year old female (Quaker) with history of non-ischemic cardiomyopathy with EF of 20% s/p CRT-D (LV lead de-activated during last admission), severe pulm htn, RA, HTN, CKD, chronic DVT / PE, presents for worsening shortness of breath and chest pain    # Chest pain  - Atypical chest pain; now significantly improved  - EKG with LBBB, unchanged  - No evidence of ACS      # Shortness of breath, HFrEF  - Multifactorial  - Patient doesn't appear to be in fluid overload clinically  - Would hold IV diuretics for now, reassess renal function. Monitor I/O, keep in even balance.  - s/p AICD, LV lead non-functional - may need to consider revision as outpatient - f/u with EP    # OMERO  - Underlying CRI, recently received contrast for CT scan in ER during previous admission  - Nephrology evaluation  - May need RRT    #HTN, nonischemic CHF    - C/w BB, Hydralazine  - Add Isosorbide dinitrate 10 mg TID (with hydralazine)

## 2020-12-25 NOTE — H&P ADULT - HISTORY OF PRESENT ILLNESS
85 year old female (Confucianist) with history of HFrEF EF 20% w/ AICD, severe pulm htn, RA, HTN, CKD presents for worsening shortness of breath and chest pain.  Her dyspnea was on exertion this morning which she states was minimal.  She states her chest pain is on and off and does not worsen on exertion.  It is substernal and nonreproducible.  At the time of my exam her chest pain was much improved.  Of note she was discharged on the 17th for shortness of breath and was discharged on lasix with the presumed diagnosis of cardiorenal syndrome.  She then returned to the ED on the 19th for chest pain and had a CTA done and was negative for PE and discharged.  She currently denies any abdominal pain, denies dizziness, she is not anuric.    In the ED she was found to have a trop of 0.04, increase of creatinine from 2.7 to 5.1 (received IV contrast in between for CTA), and BNP 19962.  She received one dose of lasix 40 IV for which her urine output increased, and chest xray was done showing no pleural effusions but cephalization.    Triage vitals: /89  HR 90  RR 18  Temp 98.4  SpO2 97% on room air

## 2020-12-25 NOTE — ED ADULT NURSE NOTE - NSIMPLEMENTINTERV_GEN_ALL_ED
Implemented All Fall with Harm Risk Interventions:  [place] to call system. Call bell, personal items and telephone within reach. Instruct patient to call for assistance. Room bathroom lighting operational. Non-slip footwear when patient is off stretcher. Physically safe environment: no spills, clutter or unnecessary equipment. Stretcher in lowest position, wheels locked, appropriate side rails in place. Provide visual cue, wrist band, yellow gown, etc. Monitor gait and stability. Monitor for mental status changes and reorient to person, place, and time. Review medications for side effects contributing to fall risk. Reinforce activity limits and safety measures with patient and family. Provide visual clues: red socks.

## 2020-12-25 NOTE — H&P ADULT - NSHPPHYSICALEXAM_GEN_ALL_CORE
General: WN/WD NAD  Neurology: A&Ox3, nonfocal, JUÁREZ x 4  Head:  Normocephalic, atraumatic  ENT:  Mucosa moist, no ulcerations  Neck:  Supple, no sinuses or palpable masses  Lymphatic:  No palpable cervical, supraclavicular adenopathy  Respiratory: CTA B/L  CV: RRR, S1S2, no murmur, prominent JVD  Abdominal: Soft, NT, ND no palpable mass  MSK: +1 lower extremity edema bilaterally  Incisions: intact, no erythema or drainage

## 2020-12-25 NOTE — H&P ADULT - NSHPREVIEWOFSYSTEMS_GEN_ALL_CORE
CONSTITUTIONAL: No weakness, fevers or chills  EYES/ENT: No visual changes;  No vertigo or throat pain   NECK: No pain or stiffness  RESPIRATORY: No cough, wheezing, hemoptysis; mild shortness of breath on exertion  CARDIOVASCULAR: chest pain on and off, substernal nonreproducible does not get worse with exertion  GASTROINTESTINAL: No abdominal or epigastric pain. No nausea, vomiting, or hematemesis; No diarrhea or constipation. No melena or hematochezia.  GENITOURINARY: No dysuria, frequency or hematuria  NEUROLOGICAL: No numbness or weakness  SKIN: No itching, rashes

## 2020-12-25 NOTE — H&P ADULT - ASSESSMENT
85 year old female (Nondenominational) with history of HFrEF EF 20% w/ AICD, severe pulm htn, RA, HTN, CKD presents for worsening shortness of breath and chest pain likely secondary to CRS type 3 from contrast induced nephropathy    # Worsening shortness of breath and intermittent atypical chest pain likely secondary to cardio renal syndrome (type 3 OMERO precipitated) from contrast induced nephropathy in the setting of CKD and HFrEF EF 20%  - received contrast on the 19th to rule out PE, creatinine increased from 2.7 to 5.1 with BNP of 43701 // chest xray shows no pleural effusions but cephalization, however patient has severe pulm HTN so possibly right sided failure currently, JVD prominent and +1 bl le edema clinically volume overloaded // mild dyspnea on exertion may be pulm htn related  - trop 0.04 now and hemolyzed, patient currently stating chest pain is improved, pain is substernal, does not increase with exertion // unlikely ACS possibly from demand due to fluid overload and OMERO  - patient received lasix 40 IV in the ED with increased urine output, f/u cr in the AM // ordered lasix 40 IV qd, strict ins/outs/daily weights  - c/w toprol 25 qd  - patient is not anuric but likely approaching dialysis  - f/u echo  - f/u cardio, f/u nephro    # Nonischemic cardiomyopathy, HFrEF EF 20% in June  - c/w toprol, c/w hydralazine for now unable to take ACE/ARB and recently discontinued entresto  - possibly secondary to longstanding RA? especially with severe pulm htn without body habitus for JORDY and nonsmoker    # OMERO on CKD  - received IV contrast on the 19th, not anuric, creatinine 2.7 to 5.1  - potassium was hemolyzed vbg shows 5.2 and not acidotic // had good response to lasix so will likely decrease  - f/u nephro, f/u renal us, f/u urine lytes and spep/upep  - c/w phoslo f/u am phosphorus    # HTN  - c/w hydralazine for now, if becoming hypotensive but still hypervolemic consider discontinuing while on IV diuresis  - c/w toprol    # Hx of DVT  - c/w low dose Eliquis    # HLD  - c/w atorvastatin 40 qd    # Depression  - c/w sertraline    # Glaucoma  - c/w latanoprost   85 year old female (Yazidism) with history of HFrEF EF 20% w/ AICD, severe pulm htn, RA, HTN, CKD presents for worsening shortness of breath and chest pain likely secondary to CRS type 3 from contrast induced nephropathy    # Worsening shortness of breath and intermittent atypical chest pain likely secondary to cardio renal syndrome (type 3 OMERO precipitated) from contrast induced nephropathy in the setting of CKD and HFrEF EF 20%  - received contrast on the 19th to rule out PE, creatinine increased from 2.7 to 5.1 with BNP of 31361 // chest xray shows no pleural effusions but cephalization, however patient has severe pulm HTN so possibly right sided failure currently, JVD prominent and +1 bl le edema clinically volume overloaded // mild dyspnea on exertion may be pulm htn related  - trop 0.04 now and hemolyzed, patient currently stating chest pain is improved, pain is substernal, does not increase with exertion // unlikely ACS possibly from demand due to fluid overload and OMERO  - patient received lasix 40 IV in the ED with increased urine output, f/u cr in the AM // ordered lasix 40 IV qd, strict ins/outs/daily weights  - c/w toprol 25 qd  - patient is not anuric but likely approaching dialysis  - f/u echo  - f/u cardio, f/u nephro    # Nonischemic cardiomyopathy, HFrEF EF 20% in June  - c/w toprol, c/w hydralazine for now unable to take ACE/ARB and recently discontinued entresto  - possibly secondary to longstanding RA? especially with severe pulm htn without body habitus for JORDY and nonsmoker  - f/u RF / ESR for disease activity    # OMERO on CKD  - received IV contrast on the 19th, not anuric, creatinine 2.7 to 5.1  - potassium was hemolyzed vbg shows 5.2 and not acidotic // had good response to lasix so will likely decrease  - f/u nephro, f/u renal us, f/u urine lytes and spep/upep  - c/w phoslo f/u am phosphorus    # HTN  - c/w hydralazine for now, if becoming hypotensive but still hypervolemic consider discontinuing while on IV diuresis  - c/w toprol    # Hx of DVT  - c/w low dose Eliquis    # HLD  - c/w atorvastatin 40 qd    # Depression  - c/w sertraline    # Glaucoma  - c/w latanoprost    PLAN: f/u urine output and creatinine, diurese until euvolemic, trend troponin, f/u echo patient is Orthod[employer]    # DVT PPX: eliquis 2.5 bid  # GI PPX: protonix  # Diet: DASH renal restrictions and 1500 mL fluid restriction, consider taking off renal restrictions once patient is diuresing adequately to avoid hypokalemia  FULL CODE

## 2020-12-25 NOTE — H&P ADULT - NSHPLABSRESULTS_GEN_ALL_CORE
LABS/RADIOLOGY RESULTS:                          9.7    9.07  )-----------( 267      ( 25 Dec 2020 00:19 )             31.6   12-25    131<L>  |  96<L>  |  55<H>  ----------------------------<  105<H>  5.9<H>   |  22  |  5.1<HH>    Ca    9.4      25 Dec 2020 00:19    TPro  6.9  /  Alb  3.5  /  TBili  0.3  /  DBili  <0.2  /  AST  40  /  ALT  17  /  AlkPhos  140<H>  12-25  PT/INR - ( 25 Dec 2020 00:19 )   PT: 13.90 sec;   INR: 1.21 ratio         PTT - ( 25 Dec 2020 00:19 )  PTT:27.2 secBlood Cultures    Troponin T, Serum: 0.04: Hemolyzed. Interpret with caution     Serum Pro-Brain Natriuretic Peptide: 29735 pg/mL (12.25.20 @ 00:19)     chest xray shows no pleural effusions but cephalization

## 2020-12-25 NOTE — CONSULT NOTE ADULT - SUBJECTIVE AND OBJECTIVE BOX
HPI:  85 year old female (Mandaeism) with history of non-ischemic cardiomyopathy with EF of 20% s/p CRT-D (LV lead de-activated during last admission), severe pulm htn, RA, HTN, CKD, chronic DVT / PE, presents for worsening shortness of breath and chest pain.  Her dyspnea was on exertion this morning which she states was minimal.  She states her chest pain is on and off and does not worsen on exertion.  It is substernal and nonreproducible.  At the time of my exam her chest pain was much improved.  Of note she was discharged on the 17th for shortness of breath and was discharged on lasix with the presumed diagnosis of cardiorenal syndrome.  She then returned to the ED on the 19th for chest pain and had a CTA done and was negative for PE and discharged.  She currently denies any abdominal pain, denies dizziness, she is not anuric.    In the ED she was found to have a trop of 0.04, increase of creatinine from 2.7 to 5.1 (received IV contrast in between for CTA), and BNP 70528.  She received one dose of lasix 40 IV for which her urine output increased, and chest xray was done showing no pleural effusions but cephalization.    Triage vitals: /89  HR 90  RR 18  Temp 98.4  SpO2 97% on room air (25 Dec 2020 02:57)      PAST MEDICAL & SURGICAL HISTORY  DVT, lower extremity    Rheumatoid arthritis    Non-ischemic cardiomyopathy    Diverticulitis  sigmoid    Gout    Hypertension    Pacemaker    Chronic kidney disease    History of hysterectomy    History of tonsillectomy    History of appendectomy    Artificial pacemaker        FAMILY HISTORY:  FAMILY HISTORY:  FH: arthritis  sister        SOCIAL HISTORY:  []smoker  []Alcohol  []Drug    ALLERGIES:  Keflex (Swelling)      MEDICATIONS:  MEDICATIONS  (STANDING):  apixaban 2.5 milliGRAM(s) Oral two times a day  atorvastatin 40 milliGRAM(s) Oral at bedtime  calcium acetate 667 milliGRAM(s) Oral three times a day with meals  ferrous    sulfate 325 milliGRAM(s) Oral daily  folic acid 1 milliGRAM(s) Oral daily  furosemide    Tablet 40 milliGRAM(s) Oral every 24 hours  hydrALAZINE 25 milliGRAM(s) Oral three times a day  latanoprost 0.005% Ophthalmic Solution 1 Drop(s) Both EYES at bedtime  metoprolol succinate ER 25 milliGRAM(s) Oral daily  pantoprazole    Tablet 40 milliGRAM(s) Oral before breakfast  senna 2 Tablet(s) Oral at bedtime  sertraline 50 milliGRAM(s) Oral daily    MEDICATIONS  (PRN):      HOME MEDICATIONS:  Home Medications:  acetaminophen 325 mg oral tablet: 2 tab(s) orally every 6 hours (17 Dec 2020 16:10)  apixaban 2.5 mg oral tablet: 1 tab(s) orally 2 times a day (15 Sep 2020 11:08)  atorvastatin 40 mg oral tablet: 1 tab(s) orally once a day (13 Sep 2020 08:58)  calcium acetate 667 mg oral tablet: 1 tab(s) orally once a day (13 Sep 2020 08:58)  folic acid 1 mg oral tablet: 1 tab(s) orally once a day (13 Sep 2020 08:58)  furosemide 20 mg oral tablet: 1 tab(s) orally Tuesday, Thursday, Saturday, Sunday (13 Sep 2020 08:58)  hydrALAZINE 25 mg oral tablet: 1 tab(s) orally 3 times a day (17 Dec 2020 16:10)  latanoprost 0.005% ophthalmic solution: 1 drop(s) to each affected eye once a day (in the evening) (13 Sep 2020 08:58)  Metoprolol Succinate ER 25 mg oral tablet, extended release: 1 tab(s) orally once a day (13 Sep 2020 08:58)  sertraline 50 mg oral tablet: 1 tab(s) orally once a day (13 Sep 2020 08:58)      VITALS:   T(F): 98.2 (12-25 @ 06:00), Max: 98.4 (12-24 @ 23:09)  HR: 77 (12-25 @ 06:00) (77 - 90)  BP: 142/76 (12-25 @ 06:00) (142/76 - 174/89)  BP(mean): --  RR: 18 (12-25 @ 06:00) (18 - 18)  SpO2: 98% (12-25 @ 06:00) (97% - 98%)    I&O's Summary      REVIEW OF SYSTEMS:  CONSTITUTIONAL: No weakness, fevers or chills  EYES: No visual changes  ENT: No vertigo or throat pain   NECK: No pain or stiffness  RESPIRATORY: Shortness of breath as described in HPI  CARDIOVASCULAR: Chest pain as described in HPI  GASTROINTESTINAL: No abdominal or epigastric pain. No nausea, vomiting, or hematemesis; No diarrhea or constipation. No melena or hematochezia.  GENITOURINARY: No dysuria, frequency or hematuria  NEUROLOGICAL: No numbness or weakness  SKIN: No itching, no rashes  MSK: No pain    PHYSICAL EXAM:  NEURO: patient is awake , alert and oriented  GEN: Not in acute distress  NECK: no thyroid enlargement, no JVD  LUNGS: Clear to auscultation bilaterally   CARDIOVASCULAR: S1/S2 present, RRR  ABD: Soft, non-tender, non-distended  EXT: Mild LE edema  SKIN: Intact    LABS:                        9.7    9.07  )-----------( 267      ( 25 Dec 2020 00:19 )             31.6     12-25    131<L>  |  96<L>  |  55<H>  ----------------------------<  105<H>  5.9<H>   |  22  |  5.1<HH>    Ca    9.4      25 Dec 2020 00:19    TPro  6.9  /  Alb  3.5  /  TBili  0.3  /  DBili  <0.2  /  AST  40  /  ALT  17  /  AlkPhos  140<H>  12-25    PT/INR - ( 25 Dec 2020 00:19 )   PT: 13.90 sec;   INR: 1.21 ratio         PTT - ( 25 Dec 2020 00:19 )  PTT:27.2 sec  Troponin T, Serum: 0.04 ng/mL <HH> (12-25-20 @ 00:19)    CARDIAC MARKERS ( 25 Dec 2020 00:19 )  x     / 0.04 ng/mL / x     / x     / x            Troponin trend:    Serum Pro-Brain Natriuretic Peptide: 48269 pg/mL (12-25-20 @ 00:19)          RADIOLOGY:  -CXR:    < from: Xray Chest 1 View- PORTABLE-Urgent (Xray Chest 1 View- PORTABLE-Urgent .) (12.19.20 @ 17:45) >  Impression:    No radiographic evidence of acute cardiopulmonary disease.    < end of copied text >    -TTE:    < from: Transthoracic Echocardiogram (06.21.20 @ 11:33) >  Summary:   1. Left ventricular ejection fraction, by visual estimation, is 20 to 25%.   2. Severely decreased global left ventricular systolic function.   3. Spectral Doppler shows pseudonormal pattern of left ventricular myocardial filling (Grade II diastolic dysfunction).   4. Left atrium is mildly enlarged.   5. Mildly enlarged right atrium.   6. Mild mitral valve regurgitation.   7. Mild thickening of the anterior and posterior mitral valve leaflets.   8. Moderate tricuspid regurgitation.   9. Estimated pulmonary artery systolic pressure is 63.7 mmHg assuming a right atrial pressure of 8 mmHg, which is consistent with severe pulmonary hypertension.    < end of copied text >    -CCTA:  -STRESS TEST:    < from: NM Nuclear Stress Pharmacologic Multiple (06.22.20 @ 11:26) >  Impression:   1. NORMAL ADENOSINE / REST MYOCARDIAL PERFUSION SPECT TOMOGRAPHY, WITH NO EVIDENCE FOR ISCHEMIA DURING ADENOSINE INFUSION.   2. NORMAL RESTING LEFT VENTRICULAR WALL MOTION AND WALL THICKENING.  3. LEFT VENTRICULAR EJECTION FRACTION OF  56 % WHICH IS WITHIN RANGE OF NORMAL.     < end of copied text >    -CATHETERIZATION:  Cath in 2014: normal coronaries    ECG: NSR, LBBB    TELEMETRY EVENTS:   HPI:  85 year old female ([employer]) with history of non-ischemic cardiomyopathy with EF of 20% s/p CRT-D (LV lead de-activated during last admission), severe pulm htn, RA, HTN, CKD, chronic DVT / PE, presents for worsening shortness of breath and chest pain.  Her dyspnea was on exertion this morning which she states was minimal.  She states her chest pain is on and off and does not worsen on exertion.  It is substernal and nonreproducible.  At the time of my exam her chest pain was much improved.  Of note she was discharged on the 17th for shortness of breath and was discharged on lasix with the presumed diagnosis of cardiorenal syndrome.  She then returned to the ED on the 19th for chest pain and had a CTA done and was negative for PE and discharged.  She currently denies any abdominal pain, denies dizziness, she is not anuric.    In the ED she was found to have a trop of 0.04, increase of creatinine from 2.7 to 5.1 (received IV contrast in between for CTA), and BNP 52633.  She received one dose of lasix 40 IV for which her urine output increased, and chest xray was done showing no pleural effusions but cephalization.    Triage vitals: /89  HR 90  RR 18  Temp 98.4  SpO2 97% on room air (25 Dec 2020 02:57)        PAST MEDICAL & SURGICAL HISTORY  DVT, lower extremity    Rheumatoid arthritis    Non-ischemic cardiomyopathy    Diverticulitis  sigmoid    Gout    Hypertension    Pacemaker    Chronic kidney disease    History of hysterectomy    History of tonsillectomy    History of appendectomy    Artificial pacemaker        FAMILY HISTORY:  FAMILY HISTORY:  FH: arthritis  sister        SOCIAL HISTORY:  []smoker  []Alcohol  []Drug    ALLERGIES:  Keflex (Swelling)      MEDICATIONS:  MEDICATIONS  (STANDING):  apixaban 2.5 milliGRAM(s) Oral two times a day  atorvastatin 40 milliGRAM(s) Oral at bedtime  calcium acetate 667 milliGRAM(s) Oral three times a day with meals  ferrous    sulfate 325 milliGRAM(s) Oral daily  folic acid 1 milliGRAM(s) Oral daily  furosemide    Tablet 40 milliGRAM(s) Oral every 24 hours  hydrALAZINE 25 milliGRAM(s) Oral three times a day  latanoprost 0.005% Ophthalmic Solution 1 Drop(s) Both EYES at bedtime  metoprolol succinate ER 25 milliGRAM(s) Oral daily  pantoprazole    Tablet 40 milliGRAM(s) Oral before breakfast  senna 2 Tablet(s) Oral at bedtime  sertraline 50 milliGRAM(s) Oral daily    MEDICATIONS  (PRN):      HOME MEDICATIONS:  Home Medications:  acetaminophen 325 mg oral tablet: 2 tab(s) orally every 6 hours (17 Dec 2020 16:10)  apixaban 2.5 mg oral tablet: 1 tab(s) orally 2 times a day (15 Sep 2020 11:08)  atorvastatin 40 mg oral tablet: 1 tab(s) orally once a day (13 Sep 2020 08:58)  calcium acetate 667 mg oral tablet: 1 tab(s) orally once a day (13 Sep 2020 08:58)  folic acid 1 mg oral tablet: 1 tab(s) orally once a day (13 Sep 2020 08:58)  furosemide 20 mg oral tablet: 1 tab(s) orally Tuesday, Thursday, Saturday, Sunday (13 Sep 2020 08:58)  hydrALAZINE 25 mg oral tablet: 1 tab(s) orally 3 times a day (17 Dec 2020 16:10)  latanoprost 0.005% ophthalmic solution: 1 drop(s) to each affected eye once a day (in the evening) (13 Sep 2020 08:58)  Metoprolol Succinate ER 25 mg oral tablet, extended release: 1 tab(s) orally once a day (13 Sep 2020 08:58)  sertraline 50 mg oral tablet: 1 tab(s) orally once a day (13 Sep 2020 08:58)      VITALS:   T(F): 98.2 (12-25 @ 06:00), Max: 98.4 (12-24 @ 23:09)  HR: 77 (12-25 @ 06:00) (77 - 90)  BP: 142/76 (12-25 @ 06:00) (142/76 - 174/89)  BP(mean): --  RR: 18 (12-25 @ 06:00) (18 - 18)  SpO2: 98% (12-25 @ 06:00) (97% - 98%)    I&O's Summary      REVIEW OF SYSTEMS:  CONSTITUTIONAL: No weakness, fevers or chills  EYES: No visual changes  ENT: No vertigo or throat pain   NECK: No pain or stiffness  RESPIRATORY: Shortness of breath as described in HPI  CARDIOVASCULAR: Chest pain as described in HPI  GASTROINTESTINAL: No abdominal or epigastric pain. No nausea, vomiting, or hematemesis; No diarrhea or constipation. No melena or hematochezia.  GENITOURINARY: No dysuria, frequency or hematuria  NEUROLOGICAL: No numbness or weakness  SKIN: No itching, no rashes  MSK: No pain    PHYSICAL EXAM:  NEURO: patient is awake , alert and oriented  GEN: Not in acute distress  NECK: no thyroid enlargement, no JVD  LUNGS: Clear to auscultation bilaterally   CARDIOVASCULAR: S1/S2 present, RRR  ABD: Soft, non-tender, non-distended  EXT: Mild LE edema  SKIN: Intact    LABS:                        9.7    9.07  )-----------( 267      ( 25 Dec 2020 00:19 )             31.6     12-25    131<L>  |  96<L>  |  55<H>  ----------------------------<  105<H>  5.9<H>   |  22  |  5.1<HH>    Ca    9.4      25 Dec 2020 00:19    TPro  6.9  /  Alb  3.5  /  TBili  0.3  /  DBili  <0.2  /  AST  40  /  ALT  17  /  AlkPhos  140<H>  12-25    PT/INR - ( 25 Dec 2020 00:19 )   PT: 13.90 sec;   INR: 1.21 ratio         PTT - ( 25 Dec 2020 00:19 )  PTT:27.2 sec  Troponin T, Serum: 0.04 ng/mL <HH> (12-25-20 @ 00:19)    CARDIAC MARKERS ( 25 Dec 2020 00:19 )  x     / 0.04 ng/mL / x     / x     / x            Troponin trend:    Serum Pro-Brain Natriuretic Peptide: 64283 pg/mL (12-25-20 @ 00:19)          RADIOLOGY:  -CXR:    < from: Xray Chest 1 View- PORTABLE-Urgent (Xray Chest 1 View- PORTABLE-Urgent .) (12.19.20 @ 17:45) >  Impression:    No radiographic evidence of acute cardiopulmonary disease.    < end of copied text >    -TTE:    < from: Transthoracic Echocardiogram (06.21.20 @ 11:33) >  Summary:   1. Left ventricular ejection fraction, by visual estimation, is 20 to 25%.   2. Severely decreased global left ventricular systolic function.   3. Spectral Doppler shows pseudonormal pattern of left ventricular myocardial filling (Grade II diastolic dysfunction).   4. Left atrium is mildly enlarged.   5. Mildly enlarged right atrium.   6. Mild mitral valve regurgitation.   7. Mild thickening of the anterior and posterior mitral valve leaflets.   8. Moderate tricuspid regurgitation.   9. Estimated pulmonary artery systolic pressure is 63.7 mmHg assuming a right atrial pressure of 8 mmHg, which is consistent with severe pulmonary hypertension.    < end of copied text >    -CCTA:  -STRESS TEST:    < from: NM Nuclear Stress Pharmacologic Multiple (06.22.20 @ 11:26) >  Impression:   1. NORMAL ADENOSINE / REST MYOCARDIAL PERFUSION SPECT TOMOGRAPHY, WITH NO EVIDENCE FOR ISCHEMIA DURING ADENOSINE INFUSION.   2. NORMAL RESTING LEFT VENTRICULAR WALL MOTION AND WALL THICKENING.  3. LEFT VENTRICULAR EJECTION FRACTION OF  56 % WHICH IS WITHIN RANGE OF NORMAL.     < end of copied text >    -CATHETERIZATION:  Cath in 2014: normal coronaries    ECG: NSR, LBBB    TELEMETRY EVENTS:

## 2020-12-26 LAB
ANION GAP SERPL CALC-SCNC: 16 MMOL/L — HIGH (ref 7–14)
BASOPHILS # BLD AUTO: 0.04 K/UL — SIGNIFICANT CHANGE UP (ref 0–0.2)
BASOPHILS NFR BLD AUTO: 0.5 % — SIGNIFICANT CHANGE UP (ref 0–1)
BUN SERPL-MCNC: 51 MG/DL — HIGH (ref 10–20)
CALCIUM SERPL-MCNC: 8.5 MG/DL — SIGNIFICANT CHANGE UP (ref 8.5–10.1)
CHLORIDE SERPL-SCNC: 99 MMOL/L — SIGNIFICANT CHANGE UP (ref 98–110)
CO2 SERPL-SCNC: 21 MMOL/L — SIGNIFICANT CHANGE UP (ref 17–32)
CREAT SERPL-MCNC: 4.8 MG/DL — CRITICAL HIGH (ref 0.7–1.5)
CREATININE, URINE RESULT: 52 MG/DL — SIGNIFICANT CHANGE UP
EOSINOPHIL # BLD AUTO: 0.64 K/UL — SIGNIFICANT CHANGE UP (ref 0–0.7)
EOSINOPHIL NFR BLD AUTO: 8.5 % — HIGH (ref 0–8)
GLUCOSE SERPL-MCNC: 87 MG/DL — SIGNIFICANT CHANGE UP (ref 70–99)
HCT VFR BLD CALC: 31.2 % — LOW (ref 37–47)
HGB BLD-MCNC: 9.5 G/DL — LOW (ref 12–16)
IMM GRANULOCYTES NFR BLD AUTO: 0.4 % — HIGH (ref 0.1–0.3)
LYMPHOCYTES # BLD AUTO: 1.82 K/UL — SIGNIFICANT CHANGE UP (ref 1.2–3.4)
LYMPHOCYTES # BLD AUTO: 24.2 % — SIGNIFICANT CHANGE UP (ref 20.5–51.1)
MAGNESIUM SERPL-MCNC: 2.7 MG/DL — HIGH (ref 1.8–2.4)
MCHC RBC-ENTMCNC: 28.1 PG — SIGNIFICANT CHANGE UP (ref 27–31)
MCHC RBC-ENTMCNC: 30.4 G/DL — LOW (ref 32–37)
MCV RBC AUTO: 92.3 FL — SIGNIFICANT CHANGE UP (ref 81–99)
MONOCYTES # BLD AUTO: 1.32 K/UL — HIGH (ref 0.1–0.6)
MONOCYTES NFR BLD AUTO: 17.6 % — HIGH (ref 1.7–9.3)
NEUTROPHILS # BLD AUTO: 3.67 K/UL — SIGNIFICANT CHANGE UP (ref 1.4–6.5)
NEUTROPHILS NFR BLD AUTO: 48.8 % — SIGNIFICANT CHANGE UP (ref 42.2–75.2)
NRBC # BLD: 0 /100 WBCS — SIGNIFICANT CHANGE UP (ref 0–0)
PHOSPHATE SERPL-MCNC: 4.9 MG/DL — SIGNIFICANT CHANGE UP (ref 2.1–4.9)
PLATELET # BLD AUTO: 258 K/UL — SIGNIFICANT CHANGE UP (ref 130–400)
POTASSIUM SERPL-MCNC: 5 MMOL/L — SIGNIFICANT CHANGE UP (ref 3.5–5)
POTASSIUM SERPL-SCNC: 5 MMOL/L — SIGNIFICANT CHANGE UP (ref 3.5–5)
PROT ?TM UR-MCNC: 76 MG/DL — HIGH (ref 0–12)
PROT SERPL-MCNC: 6.2 G/DL — SIGNIFICANT CHANGE UP (ref 6–8.3)
PROT SERPL-MCNC: 6.2 G/DL — SIGNIFICANT CHANGE UP (ref 6–8.3)
RBC # BLD: 3.38 M/UL — LOW (ref 4.2–5.4)
RBC # FLD: 14.1 % — SIGNIFICANT CHANGE UP (ref 11.5–14.5)
SODIUM SERPL-SCNC: 136 MMOL/L — SIGNIFICANT CHANGE UP (ref 135–146)
WBC # BLD: 7.52 K/UL — SIGNIFICANT CHANGE UP (ref 4.8–10.8)
WBC # FLD AUTO: 7.52 K/UL — SIGNIFICANT CHANGE UP (ref 4.8–10.8)

## 2020-12-26 PROCEDURE — 99233 SBSQ HOSP IP/OBS HIGH 50: CPT

## 2020-12-26 PROCEDURE — 93010 ELECTROCARDIOGRAM REPORT: CPT

## 2020-12-26 RX ORDER — MORPHINE SULFATE 50 MG/1
2 CAPSULE, EXTENDED RELEASE ORAL ONCE
Refills: 0 | Status: DISCONTINUED | OUTPATIENT
Start: 2020-12-26 | End: 2020-12-26

## 2020-12-26 RX ORDER — LIDOCAINE 4 G/100G
1 CREAM TOPICAL EVERY 24 HOURS
Refills: 0 | Status: DISCONTINUED | OUTPATIENT
Start: 2020-12-26 | End: 2020-12-29

## 2020-12-26 RX ADMIN — Medication 650 MILLIGRAM(S): at 08:52

## 2020-12-26 RX ADMIN — MORPHINE SULFATE 2 MILLIGRAM(S): 50 CAPSULE, EXTENDED RELEASE ORAL at 07:47

## 2020-12-26 RX ADMIN — Medication 1 MILLIGRAM(S): at 11:22

## 2020-12-26 RX ADMIN — SERTRALINE 50 MILLIGRAM(S): 25 TABLET, FILM COATED ORAL at 11:22

## 2020-12-26 RX ADMIN — SENNA PLUS 2 TABLET(S): 8.6 TABLET ORAL at 21:39

## 2020-12-26 RX ADMIN — ISOSORBIDE DINITRATE 10 MILLIGRAM(S): 5 TABLET ORAL at 21:39

## 2020-12-26 RX ADMIN — PANTOPRAZOLE SODIUM 40 MILLIGRAM(S): 20 TABLET, DELAYED RELEASE ORAL at 05:51

## 2020-12-26 RX ADMIN — ATORVASTATIN CALCIUM 40 MILLIGRAM(S): 80 TABLET, FILM COATED ORAL at 21:39

## 2020-12-26 RX ADMIN — LATANOPROST 1 DROP(S): 0.05 SOLUTION/ DROPS OPHTHALMIC; TOPICAL at 21:40

## 2020-12-26 RX ADMIN — Medication 667 MILLIGRAM(S): at 08:52

## 2020-12-26 RX ADMIN — LIDOCAINE 1 PATCH: 4 CREAM TOPICAL at 21:40

## 2020-12-26 RX ADMIN — Medication 40 MILLIGRAM(S): at 17:39

## 2020-12-26 RX ADMIN — Medication 667 MILLIGRAM(S): at 12:35

## 2020-12-26 RX ADMIN — Medication 667 MILLIGRAM(S): at 17:39

## 2020-12-26 RX ADMIN — MORPHINE SULFATE 2 MILLIGRAM(S): 50 CAPSULE, EXTENDED RELEASE ORAL at 08:10

## 2020-12-26 RX ADMIN — LIDOCAINE 1 PATCH: 4 CREAM TOPICAL at 07:25

## 2020-12-26 RX ADMIN — APIXABAN 2.5 MILLIGRAM(S): 2.5 TABLET, FILM COATED ORAL at 05:51

## 2020-12-26 RX ADMIN — Medication 25 MILLIGRAM(S): at 05:52

## 2020-12-26 RX ADMIN — Medication 325 MILLIGRAM(S): at 11:22

## 2020-12-26 RX ADMIN — LIDOCAINE 1 PATCH: 4 CREAM TOPICAL at 11:25

## 2020-12-26 RX ADMIN — Medication 30 MILLILITER(S): at 20:17

## 2020-12-26 RX ADMIN — Medication 25 MILLIGRAM(S): at 17:39

## 2020-12-26 RX ADMIN — APIXABAN 2.5 MILLIGRAM(S): 2.5 TABLET, FILM COATED ORAL at 17:39

## 2020-12-26 RX ADMIN — ISOSORBIDE DINITRATE 10 MILLIGRAM(S): 5 TABLET ORAL at 17:40

## 2020-12-26 RX ADMIN — ISOSORBIDE DINITRATE 10 MILLIGRAM(S): 5 TABLET ORAL at 05:51

## 2020-12-26 RX ADMIN — Medication 25 MILLIGRAM(S): at 21:39

## 2020-12-26 RX ADMIN — Medication 650 MILLIGRAM(S): at 00:12

## 2020-12-26 NOTE — PROGRESS NOTE ADULT - SUBJECTIVE AND OBJECTIVE BOX
DAILY PROGRESS NOTE  ===========================================================    Patient Information:  RACHEL SUMMERS  /  85y  /  Female  /  MRN#: [id number]    Hospital Day: 1d     |:::::::::::::::::::::::::::| SUBJECTIVE |:::::::::::::::::::::::::::|    OVERNIGHT EVENTS:   TODAY: Patient was seen today at bedside. Review of systems is otherwise negative.    |:::::::::::::::::::::::::::| OBJECTIVE |:::::::::::::::::::::::::::|    VITAL SIGNS: Last 24 Hours  T(C): 36.4 (26 Dec 2020 05:00), Max: 36.8 (25 Dec 2020 13:58)  T(F): 97.5 (26 Dec 2020 05:00), Max: 98.3 (25 Dec 2020 20:15)  HR: 72 (26 Dec 2020 05:00) (70 - 72)  BP: 150/77 (26 Dec 2020 05:00) (150/77 - 161/85)  BP(mean): --  RR: 16 (26 Dec 2020 05:00) (16 - 18)  SpO2: 100% (25 Dec 2020 20:26) (98% - 100%)    20 @ 07:01  -  20 @ 07:00  --------------------------------------------------------  IN: 0 mL / OUT: 200 mL / NET: -200 mL      PHYSICAL EXAM:  General: NAD  Neurology: A&Ox3  Head:  Normocephalic, atraumatic  Neck:  Supple, no sinuses or palpable masses  Respiratory: CTA B/L  CV: RRR, S1S2, no murmur, prominent JVD  Abdominal: Soft, NT, ND no palpable mass  MSK: +1 lower extremity edema bilaterally    LAB RESULTS:                        9.5    7.52  )-----------( 258      ( 26 Dec 2020 05:34 )             31.2         136  |  99  |  51<H>  ----------------------------<  87  5.0   |  21  |  4.8<HH>    Ca    8.5      26 Dec 2020 05:34  Phos  4.9       Mg     2.7         TPro  6.6  /  Alb  3.4<L>  /  TBili  0.7  /  DBili  x   /  AST  27  /  ALT  15  /  AlkPhos  139<H>      PT/INR - ( 25 Dec 2020 00:19 )   PT: 13.90 sec;   INR: 1.21 ratio         PTT - ( 25 Dec 2020 00:19 )  PTT:27.2 sec  Urinalysis Basic - ( 25 Dec 2020 12:35 )    Color: Colorless / Appearance: Clear / S.011 / pH: x  Gluc: x / Ketone: Negative  / Bili: Negative / Urobili: <2 mg/dL   Blood: x / Protein: 100 mg/dL / Nitrite: Negative   Leuk Esterase: Negative / RBC: 1 /HPF / WBC 1 /HPF   Sq Epi: x / Non Sq Epi: 1 /HPF / Bacteria: Negative        Troponin T, Serum: 0.01 ng/mL (20 @ 17:45)    CARDIAC MARKERS ( 25 Dec 2020 17:45 )  x     / 0.01 ng/mL / x     / x     / 2.3 ng/mL  CARDIAC MARKERS ( 25 Dec 2020 05:58 )  x     / 0.04 ng/mL / x     / x     / x      CARDIAC MARKERS ( 25 Dec 2020 00:19 )  x     / 0.04 ng/mL / x     / x     / x          MICROBIOLOGY:  Culture - Urine (12.15.20 @ 00:00)   Specimen Source: .Urine Clean Catch (Midstream)   Culture Results:   <10,000 CFU/mL Normal Urogenital Rand   RADIOLOGY:  < from: US Kidney and Bladder (20 @ 05:39) >    IMPRESSION:    No hydronephrosis bilaterally.    Urinary bladder volume of 248 cc. Patient unable to void during examination.    < end of copied text >  < from: Xray Chest 1 View-PORTABLE IMMEDIATE (Xray Chest 1 View-PORTABLE IMMEDIATE .) (20 @ 00:37) >  Impression:    No focal consolidation, pneumothorax or pleural effusion.      < end of copied text >  < from: CT Abdomen and Pelvis w/ IV Cont (20 @ 18:10) >  No significant change.  No evidence of pulmonary embolism.    < end of copied text >    ALLERGIES:  Keflex (Swelling)      ===========================================================

## 2020-12-26 NOTE — PROGRESS NOTE ADULT - SUBJECTIVE AND OBJECTIVE BOX
RACHEL SUMMERS  85y  Female      Patient is a 85y old  Female who presents with a chief complaint of chest pain, shortness of breath (26 Dec 2020 09:34)      INTERVAL HPI/OVERNIGHT EVENTS:  She c/o left side pain, with urinary burning sensation, no chest pain or SOB,   Vital Signs Last 24 Hrs  T(C): 35.6 (26 Dec 2020 14:31), Max: 36.8 (25 Dec 2020 20:15)  T(F): 96.1 (26 Dec 2020 14:31), Max: 98.3 (25 Dec 2020 20:15)  HR: 71 (26 Dec 2020 14:31) (71 - 72)  BP: 95/63 (26 Dec 2020 14:31) (95/63 - 161/85)  BP(mean): --  RR: 18 (26 Dec 2020 14:31) (16 - 18)  SpO2: 100% (25 Dec 2020 20:26) (98% - 100%)      20 @ 07:01  -  20 @ 07:00  --------------------------------------------------------  IN: 0 mL / OUT: 200 mL / NET: -200 mL            Consultant(s) Notes Reviewed:  [x ] YES  [ ] NO          MEDICATIONS  (STANDING):  apixaban 2.5 milliGRAM(s) Oral two times a day  atorvastatin 40 milliGRAM(s) Oral at bedtime  calcium acetate 667 milliGRAM(s) Oral three times a day with meals  ferrous    sulfate 325 milliGRAM(s) Oral daily  folic acid 1 milliGRAM(s) Oral daily  furosemide    Tablet 40 milliGRAM(s) Oral every 24 hours  hydrALAZINE 25 milliGRAM(s) Oral three times a day  isosorbide   dinitrate Tablet (ISORDIL) 10 milliGRAM(s) Oral three times a day  latanoprost 0.005% Ophthalmic Solution 1 Drop(s) Both EYES at bedtime  lidocaine   Patch 1 Patch Transdermal every 24 hours  metoprolol succinate ER 25 milliGRAM(s) Oral daily  pantoprazole    Tablet 40 milliGRAM(s) Oral before breakfast  senna 2 Tablet(s) Oral at bedtime  sertraline 50 milliGRAM(s) Oral daily    MEDICATIONS  (PRN):  acetaminophen   Tablet .. 650 milliGRAM(s) Oral every 6 hours PRN Temp greater or equal to 38C (100.4F), Moderate Pain (4 - 6)      LABS                          9.5    7.52  )-----------( 258      ( 26 Dec 2020 05:34 )             31.2     12    136  |  99  |  51<H>  ----------------------------<  87  5.0   |  21  |  4.8<HH>    Ca    8.5      26 Dec 2020 05:34  Phos  4.9       Mg     2.7         TPro  6.6  /  Alb  3.4<L>  /  TBili  0.7  /  DBili  x   /  AST  27  /  ALT  15  /  AlkPhos  139<H>  12-25      Urinalysis Basic - ( 25 Dec 2020 12:35 )    Color: Colorless / Appearance: Clear / S.011 / pH: x  Gluc: x / Ketone: Negative  / Bili: Negative / Urobili: <2 mg/dL   Blood: x / Protein: 100 mg/dL / Nitrite: Negative   Leuk Esterase: Negative / RBC: 1 /HPF / WBC 1 /HPF   Sq Epi: x / Non Sq Epi: 1 /HPF / Bacteria: Negative      PT/INR - ( 25 Dec 2020 00:19 )   PT: 13.90 sec;   INR: 1.21 ratio         PTT - ( 25 Dec 2020 00:19 )  PTT:27.2 sec  Lactate Trend    CARDIAC MARKERS ( 25 Dec 2020 17:45 )  x     / 0.01 ng/mL / x     / x     / 2.3 ng/mL  CARDIAC MARKERS ( 25 Dec 2020 05:58 )  x     / 0.04 ng/mL / x     / x     / x      CARDIAC MARKERS ( 25 Dec 2020 00:19 )  x     / 0.04 ng/mL / x     / x     / x          CAPILLARY BLOOD GLUCOSE            RADIOLOGY & ADDITIONAL TESTS:    Imaging Personally Reviewed:  [ ] YES  [ ] NO    HEALTH ISSUES - PROBLEM Dx:          PHYSICAL EXAM:  GENERAL: NAD, well-developed.  HEAD:  Atraumatic, Normocephalic.  EYES: EOMI, PERRLA, conjunctiva and sclera clear.  NECK: Supple, No JVD.  CHEST/LUNG: Clear to auscultation bilaterally; No wheeze.  HEART: Regular rate and rhythm; S1 S2.   ABDOMEN: Soft, Nontender, Nondistended; Bowel sounds present.  EXTREMITIES:  2+ Peripheral Pulses, No clubbing, cyanosis, or edema.  PSYCH: AAOx3.  NEUROLOGY: non-focal.  SKIN: No rashes or lesions.

## 2020-12-26 NOTE — PROGRESS NOTE ADULT - ASSESSMENT
85 year old female ([employer]) with history of HFrEF EF 20% w/ AICD, severe pulm htn, RA, HTN, CKD presents for worsening shortness of breath and chest pain likely secondary to CRS type 3 from contrast induced nephropathy    # Worsening shortness of breath and intermittent atypical chest pain likely secondary to cardio renal syndrome (type 3 OMERO precipitated) from contrast induced nephropathy in the setting of CKD and HFrEF EF 20%  - received contrast on last admission: 2.7 to 5.1  - [place] 65[address]   - CXR no pleural effusions but cephalization  - severe pulm HTN so possibly right sided failure currently, JVD prominent and +1 bl le edema clinically volume overloaded   - trop 0.04, CP improved, substernal, does not increase with exertion // unlikely ACS possibly from demand due to fluid overload and OMERO  - patient previously on lasix 40 IV qd, holding as until Cr resolves   - f/u echo  - s/p AICD, LV lead non-functional - may need to consider revision as outpatient follow up with EP    # Nonischemic cardiomyopathy, HFrEF EF 20% in June  - Atypical chest pain that has been episodic  - EKG with LBBB, unchanged  - No evidence of ACS    # OMERO on CKD  - received IV contrast on the 19th, not anuric, creatinine 2.7 to 5.1  - potassium was hemolyzed vbg shows 5.2 and not acidotic   - holding diuretics  - f/u nephro, f/u renal us, f/u urine lytes and spep/upep  - c/w phoslo f/u am phosphorus    # HTN  - c/w BB, Hydralazine  - Add isosorbide dinitrate 10 mg TID (with hydralazine)    # Hx of DVT  - c/w low dose Eliquis    # HLD  - c/w atorvastatin 40 qd    # Depression  - c/w sertraline    # Glaucoma  - c/w latanoprost      # DVT PPX: eliquis 2.5 bid  # GI PPX: protonix  # Diet: DASH renal restrictions and 1500 mL fluid restriction, consider taking off renal restrictions once patient is diuresing adequately to avoid hypokalemia  FULL CODE

## 2020-12-26 NOTE — PROGRESS NOTE ADULT - ASSESSMENT
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 ED visit on 12/19 when she received IV contrast.   Cr 5.1 from 2.7 10 days ago.   No signs of volume overload, cardiorenal syndrome less likely.   FEUrea 44% >35% compatible with intrinsic renal disease.   Caution with diuresis. Nephrology consult.     Chest pain: atypical, improved.   EKG showed old LBBB, doesn't meet criteria of acute ischemia.   Serial troponin 0.04 now 0.01     Chronic HFrEF: Non-ischemic cardiomyopathy.   clinically euvolemic.   AICD placement, LV lead with malfunction, need EP follow up outpatient.   CXR is clear. No rales on lung exam. Pro-BNP 34K, was 14K 12/14  Echo 12/25 showed LVEF 30-35%, grade II diastolic dysfunction, mild MR, mild TR.   Continue Lasix 40mg daily, Metoprolol, Hydralazine and Isordil.      Hx of DVT  On Eliquis, reduced dose, need to clarify how long she will need the treatment.       Depression  - c/w sertraline    DVT PPX: eliquis 2.5 bid    FULL CODE    #Progress Note Handoff:  Pending (specify):  Consult: Nephrology, improving renal function.   Family discussion: with her daughter,   Disposition: Home.

## 2020-12-27 LAB
ALBUMIN SERPL ELPH-MCNC: 3.3 G/DL — LOW (ref 3.5–5.2)
ALP SERPL-CCNC: 114 U/L — SIGNIFICANT CHANGE UP (ref 30–115)
ALT FLD-CCNC: 14 U/L — SIGNIFICANT CHANGE UP (ref 0–41)
ANA PAT FLD IF-IMP: SIGNIFICANT CHANGE UP
ANA TITR SER: ABNORMAL
ANION GAP SERPL CALC-SCNC: 10 MMOL/L — SIGNIFICANT CHANGE UP (ref 7–14)
AST SERPL-CCNC: 29 U/L — SIGNIFICANT CHANGE UP (ref 0–41)
BASOPHILS # BLD AUTO: 0.04 K/UL — SIGNIFICANT CHANGE UP (ref 0–0.2)
BASOPHILS NFR BLD AUTO: 0.5 % — SIGNIFICANT CHANGE UP (ref 0–1)
BILIRUB SERPL-MCNC: 0.7 MG/DL — SIGNIFICANT CHANGE UP (ref 0.2–1.2)
BUN SERPL-MCNC: 48 MG/DL — HIGH (ref 10–20)
CALCIUM SERPL-MCNC: 8.4 MG/DL — LOW (ref 8.5–10.1)
CHLORIDE SERPL-SCNC: 100 MMOL/L — SIGNIFICANT CHANGE UP (ref 98–110)
CO2 SERPL-SCNC: 25 MMOL/L — SIGNIFICANT CHANGE UP (ref 17–32)
CREAT SERPL-MCNC: 4.3 MG/DL — CRITICAL HIGH (ref 0.7–1.5)
EOSINOPHIL # BLD AUTO: 0.6 K/UL — SIGNIFICANT CHANGE UP (ref 0–0.7)
EOSINOPHIL NFR BLD AUTO: 7.4 % — SIGNIFICANT CHANGE UP (ref 0–8)
GLUCOSE SERPL-MCNC: 99 MG/DL — SIGNIFICANT CHANGE UP (ref 70–99)
HCT VFR BLD CALC: 30.3 % — LOW (ref 37–47)
HGB BLD-MCNC: 9.3 G/DL — LOW (ref 12–16)
IMM GRANULOCYTES NFR BLD AUTO: 0.5 % — HIGH (ref 0.1–0.3)
LYMPHOCYTES # BLD AUTO: 2.08 K/UL — SIGNIFICANT CHANGE UP (ref 1.2–3.4)
LYMPHOCYTES # BLD AUTO: 25.7 % — SIGNIFICANT CHANGE UP (ref 20.5–51.1)
MAGNESIUM SERPL-MCNC: 2.5 MG/DL — HIGH (ref 1.8–2.4)
MCHC RBC-ENTMCNC: 28.3 PG — SIGNIFICANT CHANGE UP (ref 27–31)
MCHC RBC-ENTMCNC: 30.7 G/DL — LOW (ref 32–37)
MCV RBC AUTO: 92.1 FL — SIGNIFICANT CHANGE UP (ref 81–99)
MONOCYTES # BLD AUTO: 1.59 K/UL — HIGH (ref 0.1–0.6)
MONOCYTES NFR BLD AUTO: 19.7 % — HIGH (ref 1.7–9.3)
NEUTROPHILS # BLD AUTO: 3.73 K/UL — SIGNIFICANT CHANGE UP (ref 1.4–6.5)
NEUTROPHILS NFR BLD AUTO: 46.2 % — SIGNIFICANT CHANGE UP (ref 42.2–75.2)
NRBC # BLD: 0 /100 WBCS — SIGNIFICANT CHANGE UP (ref 0–0)
PLATELET # BLD AUTO: 261 K/UL — SIGNIFICANT CHANGE UP (ref 130–400)
POTASSIUM SERPL-MCNC: 5.1 MMOL/L — HIGH (ref 3.5–5)
POTASSIUM SERPL-SCNC: 5.1 MMOL/L — HIGH (ref 3.5–5)
PROT SERPL-MCNC: 6.3 G/DL — SIGNIFICANT CHANGE UP (ref 6–8)
RBC # BLD: 3.29 M/UL — LOW (ref 4.2–5.4)
RBC # FLD: 14.1 % — SIGNIFICANT CHANGE UP (ref 11.5–14.5)
SODIUM SERPL-SCNC: 135 MMOL/L — SIGNIFICANT CHANGE UP (ref 135–146)
WBC # BLD: 8.08 K/UL — SIGNIFICANT CHANGE UP (ref 4.8–10.8)
WBC # FLD AUTO: 8.08 K/UL — SIGNIFICANT CHANGE UP (ref 4.8–10.8)

## 2020-12-27 PROCEDURE — 99233 SBSQ HOSP IP/OBS HIGH 50: CPT

## 2020-12-27 RX ORDER — ONDANSETRON 8 MG/1
4 TABLET, FILM COATED ORAL EVERY 6 HOURS
Refills: 0 | Status: DISCONTINUED | OUTPATIENT
Start: 2020-12-27 | End: 2020-12-29

## 2020-12-27 RX ADMIN — Medication 25 MILLIGRAM(S): at 05:53

## 2020-12-27 RX ADMIN — Medication 325 MILLIGRAM(S): at 12:05

## 2020-12-27 RX ADMIN — LIDOCAINE 1 PATCH: 4 CREAM TOPICAL at 12:03

## 2020-12-27 RX ADMIN — SENNA PLUS 2 TABLET(S): 8.6 TABLET ORAL at 21:53

## 2020-12-27 RX ADMIN — ATORVASTATIN CALCIUM 40 MILLIGRAM(S): 80 TABLET, FILM COATED ORAL at 21:52

## 2020-12-27 RX ADMIN — Medication 650 MILLIGRAM(S): at 15:00

## 2020-12-27 RX ADMIN — Medication 667 MILLIGRAM(S): at 17:32

## 2020-12-27 RX ADMIN — Medication 25 MILLIGRAM(S): at 21:52

## 2020-12-27 RX ADMIN — LIDOCAINE 1 PATCH: 4 CREAM TOPICAL at 06:03

## 2020-12-27 RX ADMIN — Medication 650 MILLIGRAM(S): at 12:06

## 2020-12-27 RX ADMIN — ISOSORBIDE DINITRATE 10 MILLIGRAM(S): 5 TABLET ORAL at 21:53

## 2020-12-27 RX ADMIN — APIXABAN 2.5 MILLIGRAM(S): 2.5 TABLET, FILM COATED ORAL at 05:53

## 2020-12-27 RX ADMIN — ISOSORBIDE DINITRATE 10 MILLIGRAM(S): 5 TABLET ORAL at 05:53

## 2020-12-27 RX ADMIN — LIDOCAINE 1 PATCH: 4 CREAM TOPICAL at 21:48

## 2020-12-27 RX ADMIN — Medication 25 MILLIGRAM(S): at 17:31

## 2020-12-27 RX ADMIN — APIXABAN 2.5 MILLIGRAM(S): 2.5 TABLET, FILM COATED ORAL at 17:32

## 2020-12-27 RX ADMIN — SERTRALINE 50 MILLIGRAM(S): 25 TABLET, FILM COATED ORAL at 12:05

## 2020-12-27 RX ADMIN — PANTOPRAZOLE SODIUM 40 MILLIGRAM(S): 20 TABLET, DELAYED RELEASE ORAL at 06:04

## 2020-12-27 RX ADMIN — Medication 1 MILLIGRAM(S): at 12:05

## 2020-12-27 RX ADMIN — ISOSORBIDE DINITRATE 10 MILLIGRAM(S): 5 TABLET ORAL at 17:31

## 2020-12-27 RX ADMIN — Medication 667 MILLIGRAM(S): at 08:20

## 2020-12-27 RX ADMIN — Medication 40 MILLIGRAM(S): at 17:32

## 2020-12-27 RX ADMIN — LATANOPROST 1 DROP(S): 0.05 SOLUTION/ DROPS OPHTHALMIC; TOPICAL at 21:52

## 2020-12-27 RX ADMIN — Medication 667 MILLIGRAM(S): at 12:05

## 2020-12-27 NOTE — PROGRESS NOTE ADULT - SUBJECTIVE AND OBJECTIVE BOX
Patient is a 85y old  Female who presents with a chief complaint of chest pain, shortness of breath (27 Dec 2020 06:35)    Patient was seen and examined.  c/o nausea  Denies chest pain ,sob  Denies any other complaints.  All systems reviewed positive history as mentioned above.    PAST MEDICAL & SURGICAL HISTORY:  DVT, lower extremity  Rheumatoid arthritis  Diastolic heart failure  Diverticulitis, sigmoid  Gout  Hypertension  Pacemaker  Chronic kidney disease  History of hysterectomy  History of tonsillectomy  History of appendectomy    Allergies  Keflex (Swelling)    MEDICATIONS  (STANDING):  apixaban 2.5 milliGRAM(s) Oral two times a day  atorvastatin 40 milliGRAM(s) Oral at bedtime  calcium acetate 667 milliGRAM(s) Oral three times a day with meals  ferrous    sulfate 325 milliGRAM(s) Oral daily  folic acid 1 milliGRAM(s) Oral daily  furosemide    Tablet 40 milliGRAM(s) Oral every 24 hours  hydrALAZINE 25 milliGRAM(s) Oral three times a day  isosorbide   dinitrate Tablet (ISORDIL) 10 milliGRAM(s) Oral three times a day  latanoprost 0.005% Ophthalmic Solution 1 Drop(s) Both EYES at bedtime  lidocaine   Patch 1 Patch Transdermal every 24 hours  metoprolol succinate ER 25 milliGRAM(s) Oral daily  pantoprazole    Tablet 40 milliGRAM(s) Oral before breakfast  senna 2 Tablet(s) Oral at bedtime  sertraline 50 milliGRAM(s) Oral daily    MEDICATIONS  (PRN):  acetaminophen   Tablet .. 650 milliGRAM(s) Oral every 6 hours PRN Temp greater or equal to 38C (100.4F), Moderate Pain (4 - 6)    Vital Signs Last 24 Hrs  T(C): 36.7  T(F): 98  HR: 71  BP: 154/76  BP(mean): --  RR: 18  SpO2: 100%    O/E:  Awake, alert, not in distress.  HEENT: atraumatic, EOMI.  Chest: clear.  CVS: SIS2 +, no murmur.  P/A: Soft, BS+  CNS: non focal.  Ext: no edema feet.  Skin: no rash, no ulcers.  All systems reviewed positive findings as above.                            9.3<L>  8.08  )-----------( 261      ( 27 Dec 2020 07:34 )             30.3<L>                        9.5<L>  7.52  )-----------( 258      ( 26 Dec 2020 05:34 )             31.2<L>        135  |  100  |  48<H>  ----------------------------<  99  5.1<H>   |  25  |  4.3<HH>  12    136  |  99  |  51<H>  ----------------------------<  87  5.0   |  21  |  4.8<HH>    Ca    8.4<L>      27 Dec 2020 07:34  Ca    8.5      26 Dec 2020 05:34  Phos  4.9       Mg     2.5         TPro  6.3  /  Alb  3.3<L>  /  TBili  0.7  /  DBili  x   /  AST  29  /  ALT  14  /  AlkPhos  114    TPro  6.2  /  Alb  x   /  TBili  x   /  DBili  x   /  AST  x   /  ALT  x   /  AlkPhos  x         CARDIAC MARKERS ( 25 Dec 2020 17:45 )  x     / 0.01 ng/mL / x     / x     / 2.3 ng/mL        Urinalysis Basic - ( 25 Dec 2020 12:35 )    Color: Colorless / Appearance: Clear / S.011 / pH: x  Gluc: x / Ketone: Negative  / Bili: Negative / Urobili: <2 mg/dL   Blood: x / Protein: 100 mg/dL / Nitrite: Negative   Leuk Esterase: Negative / RBC: 1 /HPF / WBC 1 /HPF   Sq Epi: x / Non Sq Epi: 1 /HPF / Bacteria: Negative

## 2020-12-27 NOTE — PROGRESS NOTE ADULT - ASSESSMENT
85 year old female ([place]) with history of HFrEF EF 20% w/ AICD, severe pulm htn, RA, HTN, CKD presents for worsening shortness of breath and chest pain.      # Acute kidney injury on CKD stage 4 probably ATN, Hyperphosphatemia, Hyperkalemia  -  CT Abdomen and Pelvis w/ IV Cont (12.19.20 @ 18:10) >KIDNEYS: No hydronephrosis or radiopaque renal calculi. Bilateral symmetric renal enhancement. Right lower pole renal cyst.  -  US Kidney and Bladder (12.25.20 @ 05:39) >No hydronephrosis bilaterally.Urinary bladder volume of 248 cc. Patient unable to void during examination.  - bladder scan-> foleys catheter if jonna=taining urine  - monitor I/o  - renal diet  - monitor BMP  - c/w phoslo  - nephrology consulted    # Nonischemic cardiomyopathy - s/p AICD, with atypical chest pain  - Troponin T, Serum: 0.01 ng/mL (12.25.20 @ 17:45)  - 12 Lead ECG (12.24.20 @ 23:13) >Normal sinus rhythm. Non-specific intra-ventricular conduction block  -  Xray Chest 1 View-PORTABLE IMMEDIATE (Xray Chest 1 View-PORTABLE IMMEDIATE .) (12.25.20 @ 00:37) >No focal consolidation, pneumothorax or pleural effusion.  -  TTE Echo Complete w/o Contrast w/ Doppler (12.25.20 @ 10:32) >Left ventricular ejection fraction, by visual estimation, is 30 to 35%.Moderately to severely decreased global left ventricular systolic function.(Grade II diastolic dysfunction).  - monitor I/o, daily weight , restrict fluids  - evaluated by cardiology :  LV lead non-functional - may need to consider revision as outpatient - f/u with EP  - c/w  metoprolol, hydralazine, isosorbide, lasix    # Normocytic Anemia  - c/w ferrous sulphate    # Nausea/gastritis  - C/w protonix  - zofran prn    # H/o DVT  - c/w leliquis    # H/o dyslipidemia  - c/w statin    # Depression  - c/w sertraline    # Glaucoma  - c/w latanoprost    # Full code    # Pending: clinical improvement, monitor BMP  # Disposition: Home when stable   85 year old female ([place]) with history of HFrEF EF 20% w/ AICD, severe pulm htn, RA, HTN, CKD presents for worsening shortness of breath and chest pain.      # Acute kidney injury on CKD stage 4 probably ATN, Hyperphosphatemia, Hyperkalemia  -  CT Abdomen and Pelvis w/ IV Cont (12.19.20 @ 18:10) >KIDNEYS: No hydronephrosis or radiopaque renal calculi. Bilateral symmetric renal enhancement. Right lower pole renal cyst.  -  US Kidney and Bladder (12.25.20 @ 05:39) >No hydronephrosis bilaterally.Urinary bladder volume of 248 cc. Patient unable to void during examination.  - bladder scan-> foleys catheter if jonna=taining urine  - monitor I/o  - renal diet  - creatinine down to 4.3  - monitor BMP  - c/w phoslo  - nephrology consulted    # Nonischemic cardiomyopathy - s/p AICD, with atypical chest pain  - Troponin T, Serum: 0.01 ng/mL (12.25.20 @ 17:45)  - 12 Lead ECG (12.24.20 @ 23:13) >Normal sinus rhythm. Non-specific intra-ventricular conduction block  -  Xray Chest 1 View-PORTABLE IMMEDIATE (Xray Chest 1 View-PORTABLE IMMEDIATE .) (12.25.20 @ 00:37) >No focal consolidation, pneumothorax or pleural effusion.  -  TTE Echo Complete w/o Contrast w/ Doppler (12.25.20 @ 10:32) >Left ventricular ejection fraction, by visual estimation, is 30 to 35%.Moderately to severely decreased global left ventricular systolic function.(Grade II diastolic dysfunction).  - monitor I/o, daily weight , restrict fluids  - evaluated by cardiology :  LV lead non-functional - may need to consider revision as outpatient - f/u with EP  - c/w  metoprolol, hydralazine, isosorbide, lasix    # Normocytic Anemia  - c/w ferrous sulphate    # Nausea/gastritis  - C/w protonix  - zofran prn    # H/o DVT  - c/w leliquis    # H/o dyslipidemia  - c/w statin    # Depression  - c/w sertraline    # Glaucoma  - c/w latanoprost    # Full code    # Pending: clinical improvement, monitor BMP  # Disposition: Home when stable

## 2020-12-27 NOTE — PROGRESS NOTE ADULT - SUBJECTIVE AND OBJECTIVE BOX
SUBJECTIVE:    Patient is a 85y old Female who presents with a chief complaint of chest pain, shortness of breath (26 Dec 2020 14:40)    Currently admitted to medicine with the primary diagnosis of CHF exacerbation       Today is hospital day 2d. This morning she is resting comfortably in bed and reports no new issues or overnight events.     PAST MEDICAL & SURGICAL HISTORY  DVT, lower extremity    Rheumatoid arthritis    Diastolic heart failure    Diverticulitis  sigmoid    Gout    Hypertension    Pacemaker    Chronic kidney disease    History of hysterectomy    History of tonsillectomy    History of appendectomy    Artificial pacemaker      SOCIAL HISTORY:  Negative for smoking/alcohol/drug use.     ALLERGIES:  Keflex (Swelling)    MEDICATIONS:  STANDING MEDICATIONS  apixaban 2.5 milliGRAM(s) Oral two times a day  atorvastatin 40 milliGRAM(s) Oral at bedtime  calcium acetate 667 milliGRAM(s) Oral three times a day with meals  ferrous    sulfate 325 milliGRAM(s) Oral daily  folic acid 1 milliGRAM(s) Oral daily  furosemide    Tablet 40 milliGRAM(s) Oral every 24 hours  hydrALAZINE 25 milliGRAM(s) Oral three times a day  isosorbide   dinitrate Tablet (ISORDIL) 10 milliGRAM(s) Oral three times a day  latanoprost 0.005% Ophthalmic Solution 1 Drop(s) Both EYES at bedtime  lidocaine   Patch 1 Patch Transdermal every 24 hours  metoprolol succinate ER 25 milliGRAM(s) Oral daily  pantoprazole    Tablet 40 milliGRAM(s) Oral before breakfast  senna 2 Tablet(s) Oral at bedtime  sertraline 50 milliGRAM(s) Oral daily    PRN MEDICATIONS  acetaminophen   Tablet .. 650 milliGRAM(s) Oral every 6 hours PRN    VITALS:   T(F): 98  HR: 71  BP: 154/76  RR: 18  SpO2: 100%    PHYSICAL EXAM:  GEN: NAD, lying bed comfortably  HEENT: EOMI, PERRLA, conjunctiva and sclera clear. MMM.  LUNGS: Clear to auscultation bilaterally. No rales, rhonchi, or wheezing.  HEART: S1/S2 present. RRR.   ABD: Soft, non-tender, non-distended. Bowel sounds present.  EXT: 2+ peripheral pulses. No clubbing, cyanosis, or edema.   NEURO: AAOX3. Non focal     LABS:                        9.5    7.52  )-----------( 258      ( 26 Dec 2020 05:34 )             31.2     12-26    136  |  99  |  51<H>  ----------------------------<  87  5.0   |  21  |  4.8<HH>    Ca    8.5      26 Dec 2020 05:34  Phos  4.9       Mg     2.7         TPro  6.2  /  Alb  x   /  TBili  x   /  DBili  x   /  AST  x   /  ALT  x   /  AlkPhos  x         Urinalysis Basic - ( 25 Dec 2020 12:35 )    Color: Colorless / Appearance: Clear / S.011 / pH: x  Gluc: x / Ketone: Negative  / Bili: Negative / Urobili: <2 mg/dL   Blood: x / Protein: 100 mg/dL / Nitrite: Negative   Leuk Esterase: Negative / RBC: 1 /HPF / WBC 1 /HPF   Sq Epi: x / Non Sq Epi: 1 /HPF / Bacteria: Negative      CARDIAC MARKERS ( 25 Dec 2020 17:45 )  x     / 0.01 ng/mL / x     / x     / 2.3 ng/mL             SUBJECTIVE:    Patient is a 85y old Female who presents with a chief complaint of chest pain, shortness of breath (26 Dec 2020 14:40)    Currently admitted to medicine with the primary diagnosis of CHF exacerbation       Today is hospital day 2d. This morning she is resting in bed and reports no new issues or overnight events.     PAST MEDICAL & SURGICAL HISTORY  DVT, lower extremity    Rheumatoid arthritis    Diastolic heart failure    Diverticulitis  sigmoid    Gout    Hypertension    Pacemaker    Chronic kidney disease    History of hysterectomy    History of tonsillectomy    History of appendectomy    Artificial pacemaker      SOCIAL HISTORY:  Negative for smoking/alcohol/drug use.     ALLERGIES:  Keflex (Swelling)    MEDICATIONS:  STANDING MEDICATIONS  apixaban 2.5 milliGRAM(s) Oral two times a day  atorvastatin 40 milliGRAM(s) Oral at bedtime  calcium acetate 667 milliGRAM(s) Oral three times a day with meals  ferrous    sulfate 325 milliGRAM(s) Oral daily  folic acid 1 milliGRAM(s) Oral daily  furosemide    Tablet 40 milliGRAM(s) Oral every 24 hours  hydrALAZINE 25 milliGRAM(s) Oral three times a day  isosorbide   dinitrate Tablet (ISORDIL) 10 milliGRAM(s) Oral three times a day  latanoprost 0.005% Ophthalmic Solution 1 Drop(s) Both EYES at bedtime  lidocaine   Patch 1 Patch Transdermal every 24 hours  metoprolol succinate ER 25 milliGRAM(s) Oral daily  pantoprazole    Tablet 40 milliGRAM(s) Oral before breakfast  senna 2 Tablet(s) Oral at bedtime  sertraline 50 milliGRAM(s) Oral daily    PRN MEDICATIONS  acetaminophen   Tablet .. 650 milliGRAM(s) Oral every 6 hours PRN    VITALS:   T(F): 98  HR: 71  BP: 154/76  RR: 18  SpO2: 100%    PHYSICAL EXAM:  GEN: NAD, cachectic   LUNGS: Clear to auscultation bilaterally  HEART: S1/S2 present.   ABD: Soft, non-tender, non-distended. Bowel sounds present.  EXT: No edema.   NEURO: AAOX3. Non focal     LABS:                        9.5    7.52  )-----------( 258      ( 26 Dec 2020 05:34 )             31.2         136  |  99  |  51<H>  ----------------------------<  87  5.0   |  21  |  4.8<HH>    Ca    8.5      26 Dec 2020 05:34  Phos  4.9       Mg     2.7         TPro  6.2  /  Alb  x   /  TBili  x   /  DBili  x   /  AST  x   /  ALT  x   /  AlkPhos  x         Urinalysis Basic - ( 25 Dec 2020 12:35 )    Color: Colorless / Appearance: Clear / S.011 / pH: x  Gluc: x / Ketone: Negative  / Bili: Negative / Urobili: <2 mg/dL   Blood: x / Protein: 100 mg/dL / Nitrite: Negative   Leuk Esterase: Negative / RBC: 1 /HPF / WBC 1 /HPF   Sq Epi: x / Non Sq Epi: 1 /HPF / Bacteria: Negative      CARDIAC MARKERS ( 25 Dec 2020 17:45 )  x     / 0.01 ng/mL / x     / x     / 2.3 ng/mL

## 2020-12-27 NOTE — PROGRESS NOTE ADULT - ASSESSMENT
86 yo female ([place]) with PMHx of HFrEF 20% w/ AICD, severe pulm htn, RA, HTN, CKD presents for worsening shortness of breath and chest pain likely secondary to CRS type 3 from contrast induced nephropathy    # Worsening shortness of breath and intermittent atypical chest pain likely secondary to cardio renal syndrome (type 3 OMERO precipitated) from contrast induced nephropathy in the setting of CKD and HFrEF EF 20%  # OMERO on CKD 3-4  - received contrast on last admission: creat increase 2.7 to 5.1  - BNP 01998   - CXR w/ cephalization  - patient previously on lasix 40 IV qd, holding until OEMRO resolves   - f/u echo  - s/p AICD, LV lead non-functional - may need to consider revision, outpatient f/u with EP  - f/u nephro  - c/w phoslo, f/u am phosphorus    #Hyperalemia  - monitor K   - nephro following     # HTN: c/w BB, Hydralazine, and isosorbide dinitrate 10 mg TID     # Hx of DVT: c/w low dose Eliquis    # HLD: c/w atorvastatin 40 qd    # Hx of depression: c/w sertraline    # Glaucoma: c/w latanoprost    # DVT PPX: on Eliquis 2.5 bid  # GI PPX: protonix  # Diet: DASH renal restrictions and 1500 mL fluid restriction, consider taking off renal restrictions once patient is diuresing adequately to avoid hypokalemia  FULL CODE       84 yo female ([employer]) with PMHx of HFrEF 20% w/ AICD, severe pulm htn, RA, HTN, CKD presents for worsening shortness of breath and chest pain likely secondary to CRS type 3 from contrast induced nephropathy    # Worsening shortness of breath and intermittent atypical chest pain likely secondary to cardio renal syndrome (type 3 OMERO precipitated) from contrast induced nephropathy in the setting of CKD and HFrEF EF 20%  # OMERO on CKD 3-4  - received contrast on last admission: creat increase 2.7 to 5.1  - BNP 95485   - CXR w/ cephalization  - patient previously on lasix 40 IV qd, holding until OMERO resolves   - f/u echo  - s/p AICD, LV lead non-functional - may need to consider revision, outpatient f/u with EP  - f/u nephro  - c/w phoslo, f/u am phosphorus    #Hyperkalemia  - monitor K   - nephro following     # HTN: c/w BB, Hydralazine, and isosorbide dinitrate 10 mg TID     # Hx of DVT: c/w low dose Eliquis    # HLD: c/w atorvastatin 40 qd    # Hx of depression: c/w sertraline    # Glaucoma: c/w latanoprost    # DVT PPX: on Eliquis 2.5 bid  # GI PPX: protonix  # Diet: DASH renal restrictions and 1500 mL fluid restriction, consider taking off renal restrictions once patient is diuresing adequately to avoid hypokalemia  FULL CODE

## 2020-12-28 LAB
ALBUMIN SERPL ELPH-MCNC: 3.2 G/DL — LOW (ref 3.5–5.2)
ALP SERPL-CCNC: 117 U/L — HIGH (ref 30–115)
ALT FLD-CCNC: 16 U/L — SIGNIFICANT CHANGE UP (ref 0–41)
ANION GAP SERPL CALC-SCNC: 11 MMOL/L — SIGNIFICANT CHANGE UP (ref 7–14)
AST SERPL-CCNC: 32 U/L — SIGNIFICANT CHANGE UP (ref 0–41)
BASOPHILS # BLD AUTO: 0.04 K/UL — SIGNIFICANT CHANGE UP (ref 0–0.2)
BASOPHILS NFR BLD AUTO: 0.4 % — SIGNIFICANT CHANGE UP (ref 0–1)
BILIRUB SERPL-MCNC: 0.3 MG/DL — SIGNIFICANT CHANGE UP (ref 0.2–1.2)
BUN SERPL-MCNC: 46 MG/DL — HIGH (ref 10–20)
CALCIUM SERPL-MCNC: 8 MG/DL — LOW (ref 8.5–10.1)
CHLORIDE SERPL-SCNC: 101 MMOL/L — SIGNIFICANT CHANGE UP (ref 98–110)
CO2 SERPL-SCNC: 23 MMOL/L — SIGNIFICANT CHANGE UP (ref 17–32)
CREAT SERPL-MCNC: 3.6 MG/DL — HIGH (ref 0.7–1.5)
EOSINOPHIL # BLD AUTO: 0.69 K/UL — SIGNIFICANT CHANGE UP (ref 0–0.7)
EOSINOPHIL NFR BLD AUTO: 7.6 % — SIGNIFICANT CHANGE UP (ref 0–8)
GLUCOSE SERPL-MCNC: 114 MG/DL — HIGH (ref 70–99)
HCT VFR BLD CALC: 29.7 % — LOW (ref 37–47)
HGB BLD-MCNC: 9.1 G/DL — LOW (ref 12–16)
IMM GRANULOCYTES NFR BLD AUTO: 0.7 % — HIGH (ref 0.1–0.3)
LYMPHOCYTES # BLD AUTO: 2.75 K/UL — SIGNIFICANT CHANGE UP (ref 1.2–3.4)
LYMPHOCYTES # BLD AUTO: 30.4 % — SIGNIFICANT CHANGE UP (ref 20.5–51.1)
MAGNESIUM SERPL-MCNC: 2.4 MG/DL — SIGNIFICANT CHANGE UP (ref 1.8–2.4)
MCHC RBC-ENTMCNC: 28.3 PG — SIGNIFICANT CHANGE UP (ref 27–31)
MCHC RBC-ENTMCNC: 30.6 G/DL — LOW (ref 32–37)
MCV RBC AUTO: 92.5 FL — SIGNIFICANT CHANGE UP (ref 81–99)
MONOCYTES # BLD AUTO: 1.64 K/UL — HIGH (ref 0.1–0.6)
MONOCYTES NFR BLD AUTO: 18.1 % — HIGH (ref 1.7–9.3)
NEUTROPHILS # BLD AUTO: 3.88 K/UL — SIGNIFICANT CHANGE UP (ref 1.4–6.5)
NEUTROPHILS NFR BLD AUTO: 42.8 % — SIGNIFICANT CHANGE UP (ref 42.2–75.2)
NRBC # BLD: 0 /100 WBCS — SIGNIFICANT CHANGE UP (ref 0–0)
PLATELET # BLD AUTO: 242 K/UL — SIGNIFICANT CHANGE UP (ref 130–400)
POTASSIUM SERPL-MCNC: 5.2 MMOL/L — HIGH (ref 3.5–5)
POTASSIUM SERPL-SCNC: 5.2 MMOL/L — HIGH (ref 3.5–5)
PROT SERPL-MCNC: 5.9 G/DL — LOW (ref 6–8)
RBC # BLD: 3.21 M/UL — LOW (ref 4.2–5.4)
RBC # FLD: 14.3 % — SIGNIFICANT CHANGE UP (ref 11.5–14.5)
SODIUM SERPL-SCNC: 135 MMOL/L — SIGNIFICANT CHANGE UP (ref 135–146)
WBC # BLD: 9.06 K/UL — SIGNIFICANT CHANGE UP (ref 4.8–10.8)
WBC # FLD AUTO: 9.06 K/UL — SIGNIFICANT CHANGE UP (ref 4.8–10.8)

## 2020-12-28 PROCEDURE — 99233 SBSQ HOSP IP/OBS HIGH 50: CPT

## 2020-12-28 RX ORDER — CHLORHEXIDINE GLUCONATE 213 G/1000ML
1 SOLUTION TOPICAL
Refills: 0 | Status: DISCONTINUED | OUTPATIENT
Start: 2020-12-28 | End: 2020-12-29

## 2020-12-28 RX ORDER — SODIUM ZIRCONIUM CYCLOSILICATE 10 G/10G
10 POWDER, FOR SUSPENSION ORAL ONCE
Refills: 0 | Status: COMPLETED | OUTPATIENT
Start: 2020-12-28 | End: 2020-12-28

## 2020-12-28 RX ADMIN — ISOSORBIDE DINITRATE 10 MILLIGRAM(S): 5 TABLET ORAL at 21:39

## 2020-12-28 RX ADMIN — ATORVASTATIN CALCIUM 40 MILLIGRAM(S): 80 TABLET, FILM COATED ORAL at 21:40

## 2020-12-28 RX ADMIN — LIDOCAINE 1 PATCH: 4 CREAM TOPICAL at 09:23

## 2020-12-28 RX ADMIN — ISOSORBIDE DINITRATE 10 MILLIGRAM(S): 5 TABLET ORAL at 05:29

## 2020-12-28 RX ADMIN — Medication 325 MILLIGRAM(S): at 11:42

## 2020-12-28 RX ADMIN — Medication 25 MILLIGRAM(S): at 05:28

## 2020-12-28 RX ADMIN — LIDOCAINE 1 PATCH: 4 CREAM TOPICAL at 09:31

## 2020-12-28 RX ADMIN — Medication 40 MILLIGRAM(S): at 14:55

## 2020-12-28 RX ADMIN — Medication 667 MILLIGRAM(S): at 08:09

## 2020-12-28 RX ADMIN — Medication 667 MILLIGRAM(S): at 17:27

## 2020-12-28 RX ADMIN — LIDOCAINE 1 PATCH: 4 CREAM TOPICAL at 22:47

## 2020-12-28 RX ADMIN — APIXABAN 2.5 MILLIGRAM(S): 2.5 TABLET, FILM COATED ORAL at 17:27

## 2020-12-28 RX ADMIN — APIXABAN 2.5 MILLIGRAM(S): 2.5 TABLET, FILM COATED ORAL at 05:29

## 2020-12-28 RX ADMIN — Medication 25 MILLIGRAM(S): at 14:54

## 2020-12-28 RX ADMIN — SERTRALINE 50 MILLIGRAM(S): 25 TABLET, FILM COATED ORAL at 11:42

## 2020-12-28 RX ADMIN — Medication 667 MILLIGRAM(S): at 11:42

## 2020-12-28 RX ADMIN — ISOSORBIDE DINITRATE 10 MILLIGRAM(S): 5 TABLET ORAL at 14:55

## 2020-12-28 RX ADMIN — SENNA PLUS 2 TABLET(S): 8.6 TABLET ORAL at 21:39

## 2020-12-28 RX ADMIN — Medication 25 MILLIGRAM(S): at 21:40

## 2020-12-28 RX ADMIN — Medication 25 MILLIGRAM(S): at 05:29

## 2020-12-28 RX ADMIN — SODIUM ZIRCONIUM CYCLOSILICATE 10 GRAM(S): 10 POWDER, FOR SUSPENSION ORAL at 09:27

## 2020-12-28 RX ADMIN — Medication 1 MILLIGRAM(S): at 11:42

## 2020-12-28 RX ADMIN — PANTOPRAZOLE SODIUM 40 MILLIGRAM(S): 20 TABLET, DELAYED RELEASE ORAL at 05:29

## 2020-12-28 NOTE — PHYSICAL THERAPY INITIAL EVALUATION ADULT - GENERAL OBSERVATIONS, REHAB EVAL
Pt was seen from 13:25-14:00 for PT IE. Pt was rec'd in semi reclined in bed, NAD, +tele, +carlos, agreeable to participate in PT.

## 2020-12-28 NOTE — PROGRESS NOTE ADULT - ASSESSMENT
84 yo female ([place]) with PMHx of HFrEF 20% w/ AICD, severe pulm htn, RA, HTN, CKD presents for worsening shortness of breath and chest pain likely secondary to CRS type 3 from contrast induced nephropathy    # Worsening shortness of breath and intermittent atypical chest pain likely secondary to cardio renal syndrome (type 3 OMERO precipitated) from contrast induced nephropathy in the setting of CKD and HFrEF EF 20%  # OMERO on CKD 3-4  - received contrast on last admission: creat increase 2.7>5.1>4.3  - BNP 30957   - CXR from 12/25: possible pneumothorax/ pleural effusion  - patient previously on lasix 40 IV qd, holding until OMERO resolves   - ECHO: Left ventricular wall thickness is moderately increased. Global LV systolic function was moderately to severely decreased. Left ventricular ejection fraction, by visual estimation, is 30 to35%  - s/p AICD, LV lead non-functional - may need to consider revision, outpatient f/u with EP  - f/u nephro consult    #Hyperkalemia  - K:5.1 today  - 1 dose lokelma today  - nephro following     # HTN  - c/w BB, Hydralazine, and isosorbide dinitrate 10 mg TID     # Hx of DVT  - c/w low dose Eliquis    # HLD  - c/w atorvastatin 40 qd    # Hx of depression  - c/w sertraline    # Glaucoma  -c/w latanoprost      MISC  DVT PPX: on Eliquis 2.5 bid  GI PPX: protonix  Diet: DASH renal restrictions and 1500 mL fluid restriction, consider taking off renal restrictions once patient is diuresing adequately to avoid hypokalemia  FULL CODE

## 2020-12-28 NOTE — PHYSICAL THERAPY INITIAL EVALUATION ADULT - PERTINENT HX OF CURRENT PROBLEM, REHAB EVAL
85 year old female ([place]) with history of HFrEF EF 20% w/ AICD, severe pulm htn, RA, HTN, CKD presents for worsening shortness of breath and chest pain.

## 2020-12-28 NOTE — PROGRESS NOTE ADULT - SUBJECTIVE AND OBJECTIVE BOX
Patient is a 85y old  Female who presents with a chief complaint of chest pain, shortness of breath (27 Dec 2020 06:35)    Patient was seen and examined.  Pt doing better today  Denies chest pain ,sob  Denies any other complaints.  All systems reviewed positive history as mentioned above.    PAST MEDICAL & SURGICAL HISTORY:  DVT, lower extremity  Rheumatoid arthritis  Diastolic heart failure  Diverticulitis, sigmoid  Gout  Hypertension  Pacemaker  Chronic kidney disease  History of hysterectomy  History of tonsillectomy  History of appendectomy    Allergies  Keflex (Swelling)    MEDICATIONS  (STANDING):  apixaban 2.5 milliGRAM(s) Oral two times a day  atorvastatin 40 milliGRAM(s) Oral at bedtime  calcium acetate 667 milliGRAM(s) Oral three times a day with meals  chlorhexidine 4% Liquid 1 Application(s) Topical <User Schedule>  ferrous    sulfate 325 milliGRAM(s) Oral daily  folic acid 1 milliGRAM(s) Oral daily  furosemide    Tablet 40 milliGRAM(s) Oral every 24 hours  hydrALAZINE 25 milliGRAM(s) Oral three times a day  isosorbide   dinitrate Tablet (ISORDIL) 10 milliGRAM(s) Oral three times a day  latanoprost 0.005% Ophthalmic Solution 1 Drop(s) Both EYES at bedtime  lidocaine   Patch 1 Patch Transdermal every 24 hours  metoprolol succinate ER 25 milliGRAM(s) Oral daily  pantoprazole    Tablet 40 milliGRAM(s) Oral before breakfast  senna 2 Tablet(s) Oral at bedtime  sertraline 50 milliGRAM(s) Oral daily    MEDICATIONS  (PRN):  acetaminophen   Tablet .. 650 milliGRAM(s) Oral every 6 hours PRN Temp greater or equal to 38C (100.4F), Moderate Pain (4 - 6)  ondansetron Injectable 4 milliGRAM(s) IV Push every 6 hours PRN Nausea and/or Vomiting    T(C): 36.5 (12-28-20 @ 05:05), Max: 37 (12-27-20 @ 14:03)  HR: 70 (12-28-20 @ 05:05) (69 - 76)  BP: 127/66 (12-28-20 @ 05:05) (122/61 - 137/70)  RR: 18 (12-27-20 @ 14:03) (18 - 18)  SpO2: --    O/E:  Awake, alert, not in distress.  HEENT: atraumatic, EOMI.  Chest: clear.  CVS: SIS2 +, no murmur.  P/A: Soft, BS+, foleys+  CNS: non focal.  Ext: no edema feet.  Skin: no rash, no ulcers.  All systems reviewed positive findings as above.                          9.1<L>  9.06  )-----------( 242      ( 28 Dec 2020 06:29 )             29.7<L>                        9.3<L>  8.08  )-----------( 261      ( 27 Dec 2020 07:34 )             30.3<L>  12-28    135  |  101  |  46<H>  ----------------------------<  114<H>  5.2<H>   |  23  |  3.6<H>    Ca    8.0<L>      28 Dec 2020 06:29  Mg     2.4     12-28    TPro  5.9<L>  /  Alb  3.2<L>  /  TBili  0.3  /  DBili  x   /  AST  32  /  ALT  16  /  AlkPhos  117<H>  12-28

## 2020-12-28 NOTE — PROGRESS NOTE ADULT - SUBJECTIVE AND OBJECTIVE BOX
DAILY PROGRESS NOTE  ===========================================================    Patient Information:  RACHEL SUMMERS  /  85y  /  Female  /  MRN#: [id number]    Hospital Day: 3d     |:::::::::::::::::::::::::::| SUBJECTIVE |:::::::::::::::::::::::::::|    OVERNIGHT EVENTS:   TODAY: Patient was seen today at bedside. Review of systems is otherwise negative.    |:::::::::::::::::::::::::::| OBJECTIVE |:::::::::::::::::::::::::::|    VITAL SIGNS: Last 24 Hours  T(C): 36.5 (28 Dec 2020 05:05), Max: 37 (27 Dec 2020 14:03)  T(F): 97.7 (28 Dec 2020 05:05), Max: 98.6 (27 Dec 2020 14:03)  HR: 70 (28 Dec 2020 05:05) (69 - 76)  BP: 127/66 (28 Dec 2020 05:05) (122/61 - 137/70)  BP(mean): --  RR: 18 (27 Dec 2020 14:03) (18 - 18)  SpO2: --    12-27-20 @ 07:01  -  12-28-20 @ 07:00  --------------------------------------------------------  IN: 540 mL / OUT: 1800 mL / NET: -1260 mL      PHYSICAL EXAM:  GEN: NAD, cachectic   LUNGS: Clear to auscultation bilaterally  HEART: S1/S2 present.   ABD: Soft, non-tender, non-distended. Bowel sounds present.  EXT: No edema.   NEURO: AAOX3. Non focal     LAB RESULTS:                        9.3    8.08  )-----------( 261      ( 27 Dec 2020 07:34 )             30.3     12-27    135  |  100  |  48<H>  ----------------------------<  99  5.1<H>   |  25  |  4.3<HH>    Ca    8.4<L>      27 Dec 2020 07:34  Mg     2.5     12-27    TPro  6.3  /  Alb  3.3<L>  /  TBili  0.7  /  DBili  x   /  AST  29  /  ALT  14  /  AlkPhos  114  12-27      MICROBIOLOGY:  Culture - Urine (12.15.20 @ 00:00)   Specimen Source: .Urine Clean Catch (Midstream)   Culture Results:   <10,000 CFU/mL Normal Urogenital Rand   RADIOLOGY:  < from: US Kidney and Bladder (12.25.20 @ 05:39) >  IMPRESSION:    No hydronephrosis bilaterally.    Urinary bladder volume of 248 cc. Patient unable to void during examination.    < end of copied text >  < from: Xray Chest 1 View-PORTABLE IMMEDIATE (Xray Chest 1 View-PORTABLE IMMEDIATE .) (12.25.20 @ 00:37) >    Impression:    No focal consolidation, pneumothorax or pleural effusion.    < end of copied text >  < from: CT Abdomen and Pelvis w/ IV Cont (12.19.20 @ 18:10) >  IMPRESSION:  Since December 14, 2020;    No significant change.  No evidence of pulmonary embolism.    < end of copied text >  Impression:    No focal consolidation, pneumothorax or pleural effusion.    ALLERGIES:  Keflex (Swelling)      ===========================================================

## 2020-12-28 NOTE — PROGRESS NOTE ADULT - ASSESSMENT
85 year old female ([employer]) with history of HFrEF EF 20% w/ AICD, severe pulm htn, RA, HTN, CKD presents for worsening shortness of breath and chest pain.      # Acute kidney injury on CKD stage 4 probably ATN, Hyperphosphatemia, Hyperkalemia  -  CT Abdomen and Pelvis w/ IV Cont (12.19.20 @ 18:10) >KIDNEYS: No hydronephrosis or radiopaque renal calculi. Bilateral symmetric renal enhancement. Right lower pole renal cyst.  -  US Kidney and Bladder (12.25.20 @ 05:39) >No hydronephrosis bilaterally.Urinary bladder volume of 248 cc. Patient unable to void during examination.  - bladder scan-> foleys catheter if jonna=taining urine  - monitor I/o  - renal diet  - creatinine down to 3.6  - monitor BMP  - c/w phoslo  - nephrology consulted    # Nonischemic cardiomyopathy - s/p AICD, with atypical chest pain  - Troponin T, Serum: 0.01 ng/mL (12.25.20 @ 17:45)  - 12 Lead ECG (12.24.20 @ 23:13) >Normal sinus rhythm. Non-specific intra-ventricular conduction block  -  Xray Chest 1 View-PORTABLE IMMEDIATE (Xray Chest 1 View-PORTABLE IMMEDIATE .) (12.25.20 @ 00:37) >No focal consolidation, pneumothorax or pleural effusion.  -  TTE Echo Complete w/o Contrast w/ Doppler (12.25.20 @ 10:32) >Left ventricular ejection fraction, by visual estimation, is 30 to 35%.Moderately to severely decreased global left ventricular systolic function.(Grade II diastolic dysfunction).  - monitor I/o, daily weight , restrict fluids  - evaluated by cardiology :  LV lead non-functional - may need to consider revision as outpatient - f/u with EP  - c/w  metoprolol, hydralazine, isosorbide, lasix    # Normocytic Anemia  - c/w ferrous sulphate    # Nausea/gastritis- resolved  - C/w protonix  - zofran prn    # H/o DVT  - c/w leliquis    # H/o dyslipidemia  - c/w statin    # Depression  - c/w sertraline    # Glaucoma  - c/w latanoprost    # Full code    # Pending: clinical improvement, monitor BMP, PT eval  # Disposition: Home when stable   85 year old female (L[employer]) with history of HFrEF EF 20% w/ AICD, severe pulm htn, RA, HTN, CKD presents for worsening shortness of breath and chest pain.      # Acute kidney injury on CKD stage 4 probably ATN,urinary retention Hyperphosphatemia, Hyperkalemia  -  CT Abdomen and Pelvis w/ IV Cont (12.19.20 @ 18:10) >KIDNEYS: No hydronephrosis or radiopaque renal calculi. Bilateral symmetric renal enhancement. Right lower pole renal cyst.  -  US Kidney and Bladder (12.25.20 @ 05:39) >No hydronephrosis bilaterally.Urinary bladder volume of 248 cc. Patient unable to void during examination.  - c/w  foleys catheter  - monitor I/o  - renal diet  - creatinine down to 3.6  - monitor BMP  - c/w phoslo  - nephrology consulted    # Nonischemic cardiomyopathy - s/p AICD, with atypical chest pain  - Troponin T, Serum: 0.01 ng/mL (12.25.20 @ 17:45)  - 12 Lead ECG (12.24.20 @ 23:13) >Normal sinus rhythm. Non-specific intra-ventricular conduction block  -  Xray Chest 1 View-PORTABLE IMMEDIATE (Xray Chest 1 View-PORTABLE IMMEDIATE .) (12.25.20 @ 00:37) >No focal consolidation, pneumothorax or pleural effusion.  -  TTE Echo Complete w/o Contrast w/ Doppler (12.25.20 @ 10:32) >Left ventricular ejection fraction, by visual estimation, is 30 to 35%.Moderately to severely decreased global left ventricular systolic function.(Grade II diastolic dysfunction).  - monitor I/o, daily weight , restrict fluids  - evaluated by cardiology :  LV lead non-functional - may need to consider revision as outpatient - f/u with EP  - c/w  metoprolol, hydralazine, isosorbide, lasix    # Normocytic Anemia  - c/w ferrous sulphate    # Nausea/gastritis- resolved  - C/w protonix  - zofran prn    # H/o DVT  - c/w leliquis    # H/o dyslipidemia  - c/w statin    # Depression  - c/w sertraline    # Glaucoma  - c/w latanoprost    # Full code    # Pending: clinical improvement, monitor BMP, PT eval  # Disposition: Home when stable

## 2020-12-29 ENCOUNTER — TRANSCRIPTION ENCOUNTER (OUTPATIENT)
Age: 85
End: 2020-12-29

## 2020-12-29 VITALS
SYSTOLIC BLOOD PRESSURE: 128 MMHG | RESPIRATION RATE: 18 BRPM | TEMPERATURE: 100 F | HEART RATE: 86 BPM | DIASTOLIC BLOOD PRESSURE: 59 MMHG

## 2020-12-29 LAB
% ALBUMIN: 48.7 % — SIGNIFICANT CHANGE UP
% ALPHA 1: 5.4 % — SIGNIFICANT CHANGE UP
% ALPHA 2: 15.3 % — SIGNIFICANT CHANGE UP
% BETA: 12.3 % — SIGNIFICANT CHANGE UP
% GAMMA, URINE: 9.5 % — SIGNIFICANT CHANGE UP
% GAMMA: 18.3 % — SIGNIFICANT CHANGE UP
% M SPIKE: SIGNIFICANT CHANGE UP
ALBUMIN 24H MFR UR ELPH: 59.6 % — SIGNIFICANT CHANGE UP
ALBUMIN SERPL ELPH-MCNC: 3 G/DL — LOW (ref 3.6–5.5)
ALBUMIN SERPL ELPH-MCNC: 3.2 G/DL — LOW (ref 3.5–5.2)
ALBUMIN/GLOB SERPL ELPH: 0.9 RATIO — SIGNIFICANT CHANGE UP
ALP SERPL-CCNC: 115 U/L — SIGNIFICANT CHANGE UP (ref 30–115)
ALPHA1 GLOB 24H MFR UR ELPH: 13.7 % — SIGNIFICANT CHANGE UP
ALPHA1 GLOB SERPL ELPH-MCNC: 0.3 G/DL — SIGNIFICANT CHANGE UP (ref 0.1–0.4)
ALPHA2 GLOB 24H MFR UR ELPH: 7.4 % — SIGNIFICANT CHANGE UP
ALPHA2 GLOB SERPL ELPH-MCNC: 0.9 G/DL — SIGNIFICANT CHANGE UP (ref 0.5–1)
ALT FLD-CCNC: 16 U/L — SIGNIFICANT CHANGE UP (ref 0–41)
ANION GAP SERPL CALC-SCNC: 13 MMOL/L — SIGNIFICANT CHANGE UP (ref 7–14)
AST SERPL-CCNC: 31 U/L — SIGNIFICANT CHANGE UP (ref 0–41)
B-GLOBULIN 24H MFR UR ELPH: 9.8 % — SIGNIFICANT CHANGE UP
B-GLOBULIN SERPL ELPH-MCNC: 0.8 G/DL — SIGNIFICANT CHANGE UP (ref 0.5–1)
BASOPHILS # BLD AUTO: 0.03 K/UL — SIGNIFICANT CHANGE UP (ref 0–0.2)
BASOPHILS NFR BLD AUTO: 0.3 % — SIGNIFICANT CHANGE UP (ref 0–1)
BILIRUB SERPL-MCNC: 0.3 MG/DL — SIGNIFICANT CHANGE UP (ref 0.2–1.2)
BUN SERPL-MCNC: 44 MG/DL — HIGH (ref 10–20)
CALCIUM SERPL-MCNC: 8.2 MG/DL — LOW (ref 8.5–10.1)
CHLORIDE SERPL-SCNC: 100 MMOL/L — SIGNIFICANT CHANGE UP (ref 98–110)
CO2 SERPL-SCNC: 21 MMOL/L — SIGNIFICANT CHANGE UP (ref 17–32)
CREAT SERPL-MCNC: 3.5 MG/DL — HIGH (ref 0.7–1.5)
EOSINOPHIL # BLD AUTO: 0.6 K/UL — SIGNIFICANT CHANGE UP (ref 0–0.7)
EOSINOPHIL NFR BLD AUTO: 6.2 % — SIGNIFICANT CHANGE UP (ref 0–8)
GAMMA GLOBULIN: 1.1 G/DL — SIGNIFICANT CHANGE UP (ref 0.6–1.6)
GLUCOSE SERPL-MCNC: 105 MG/DL — HIGH (ref 70–99)
HCT VFR BLD CALC: 29 % — LOW (ref 37–47)
HGB BLD-MCNC: 8.9 G/DL — LOW (ref 12–16)
HISTONE AB SER-ACNC: 0.5 UNITS — SIGNIFICANT CHANGE UP (ref 0–0.9)
IMM GRANULOCYTES NFR BLD AUTO: 0.4 % — HIGH (ref 0.1–0.3)
INTERPRETATION 24H UR IFE-IMP: SIGNIFICANT CHANGE UP
INTERPRETATION 24H UR IFE-IMP: SIGNIFICANT CHANGE UP
INTERPRETATION SERPL IFE-IMP: SIGNIFICANT CHANGE UP
LYMPHOCYTES # BLD AUTO: 2.57 K/UL — SIGNIFICANT CHANGE UP (ref 1.2–3.4)
LYMPHOCYTES # BLD AUTO: 26.6 % — SIGNIFICANT CHANGE UP (ref 20.5–51.1)
M PROTEIN 24H UR ELPH-MRATE: SIGNIFICANT CHANGE UP
M-SPIKE: SIGNIFICANT CHANGE UP (ref 0–0)
MAGNESIUM SERPL-MCNC: 2.2 MG/DL — SIGNIFICANT CHANGE UP (ref 1.8–2.4)
MCHC RBC-ENTMCNC: 28.3 PG — SIGNIFICANT CHANGE UP (ref 27–31)
MCHC RBC-ENTMCNC: 30.7 G/DL — LOW (ref 32–37)
MCV RBC AUTO: 92.4 FL — SIGNIFICANT CHANGE UP (ref 81–99)
MONOCYTES # BLD AUTO: 1.47 K/UL — HIGH (ref 0.1–0.6)
MONOCYTES NFR BLD AUTO: 15.2 % — HIGH (ref 1.7–9.3)
NEUTROPHILS # BLD AUTO: 4.95 K/UL — SIGNIFICANT CHANGE UP (ref 1.4–6.5)
NEUTROPHILS NFR BLD AUTO: 51.3 % — SIGNIFICANT CHANGE UP (ref 42.2–75.2)
NRBC # BLD: 0 /100 WBCS — SIGNIFICANT CHANGE UP (ref 0–0)
PLATELET # BLD AUTO: 232 K/UL — SIGNIFICANT CHANGE UP (ref 130–400)
POTASSIUM SERPL-MCNC: 4.1 MMOL/L — SIGNIFICANT CHANGE UP (ref 3.5–5)
POTASSIUM SERPL-SCNC: 4.1 MMOL/L — SIGNIFICANT CHANGE UP (ref 3.5–5)
PROT ?TM UR-MCNC: 76 MG/DL — HIGH (ref 0–12)
PROT PATTERN 24H UR ELPH-IMP: SIGNIFICANT CHANGE UP
PROT PATTERN SERPL ELPH-IMP: SIGNIFICANT CHANGE UP
PROT SERPL-MCNC: 5.9 G/DL — LOW (ref 6–8)
RBC # BLD: 3.14 M/UL — LOW (ref 4.2–5.4)
RBC # FLD: 14.1 % — SIGNIFICANT CHANGE UP (ref 11.5–14.5)
SODIUM SERPL-SCNC: 134 MMOL/L — LOW (ref 135–146)
TOTAL VOLUME - 24 HOUR: SIGNIFICANT CHANGE UP ML
URINE CREATININE CALCULATION: SIGNIFICANT CHANGE UP G/24 H (ref 0.8–1.8)
WBC # BLD: 9.66 K/UL — SIGNIFICANT CHANGE UP (ref 4.8–10.8)
WBC # FLD AUTO: 9.66 K/UL — SIGNIFICANT CHANGE UP (ref 4.8–10.8)

## 2020-12-29 PROCEDURE — 99232 SBSQ HOSP IP/OBS MODERATE 35: CPT

## 2020-12-29 PROCEDURE — 99239 HOSP IP/OBS DSCHRG MGMT >30: CPT

## 2020-12-29 RX ORDER — ATORVASTATIN CALCIUM 80 MG/1
1 TABLET, FILM COATED ORAL
Qty: 60 | Refills: 1
Start: 2020-12-29 | End: 2021-02-26

## 2020-12-29 RX ORDER — ATORVASTATIN CALCIUM 80 MG/1
1 TABLET, FILM COATED ORAL
Qty: 0 | Refills: 0 | DISCHARGE

## 2020-12-29 RX ORDER — ISOSORBIDE DINITRATE 5 MG/1
1 TABLET ORAL
Qty: 90 | Refills: 1
Start: 2020-12-29 | End: 2021-02-26

## 2020-12-29 RX ADMIN — PANTOPRAZOLE SODIUM 40 MILLIGRAM(S): 20 TABLET, DELAYED RELEASE ORAL at 06:10

## 2020-12-29 RX ADMIN — Medication 25 MILLIGRAM(S): at 06:10

## 2020-12-29 RX ADMIN — ISOSORBIDE DINITRATE 10 MILLIGRAM(S): 5 TABLET ORAL at 13:54

## 2020-12-29 RX ADMIN — Medication 1 MILLIGRAM(S): at 11:25

## 2020-12-29 RX ADMIN — LIDOCAINE 1 PATCH: 4 CREAM TOPICAL at 06:07

## 2020-12-29 RX ADMIN — Medication 40 MILLIGRAM(S): at 13:54

## 2020-12-29 RX ADMIN — Medication 667 MILLIGRAM(S): at 07:58

## 2020-12-29 RX ADMIN — APIXABAN 2.5 MILLIGRAM(S): 2.5 TABLET, FILM COATED ORAL at 06:10

## 2020-12-29 RX ADMIN — APIXABAN 2.5 MILLIGRAM(S): 2.5 TABLET, FILM COATED ORAL at 17:08

## 2020-12-29 RX ADMIN — SERTRALINE 50 MILLIGRAM(S): 25 TABLET, FILM COATED ORAL at 11:26

## 2020-12-29 RX ADMIN — ISOSORBIDE DINITRATE 10 MILLIGRAM(S): 5 TABLET ORAL at 06:10

## 2020-12-29 RX ADMIN — Medication 667 MILLIGRAM(S): at 17:08

## 2020-12-29 RX ADMIN — Medication 667 MILLIGRAM(S): at 12:58

## 2020-12-29 RX ADMIN — LIDOCAINE 1 PATCH: 4 CREAM TOPICAL at 12:58

## 2020-12-29 RX ADMIN — CHLORHEXIDINE GLUCONATE 1 APPLICATION(S): 213 SOLUTION TOPICAL at 06:10

## 2020-12-29 RX ADMIN — Medication 25 MILLIGRAM(S): at 13:54

## 2020-12-29 RX ADMIN — Medication 325 MILLIGRAM(S): at 11:25

## 2020-12-29 NOTE — PROGRESS NOTE ADULT - PROVIDER SPECIALTY LIST ADULT
Internal Medicine
Cardiology
Hospitalist
Internal Medicine
Internal Medicine
Hospitalist
Internal Medicine
Hospitalist

## 2020-12-29 NOTE — PROGRESS NOTE ADULT - ASSESSMENT
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) with PMHx of HFrEF 20% w/ AICD, severe pulm htn, RA, HTN, CKD presents for worsening shortness of breath and chest pain likely secondary to CRS type 3 from contrast induced nephropathy    # Worsening shortness of breath and intermittent atypical chest pain likely secondary to cardio renal syndrome (type 3 OMERO precipitated) from contrast induced nephropathy in the setting of CKD and HFrEF EF 20%  # OMERO on CKD 3-4  - received contrast on last admission: creat increase 2.7>5.1>4.3>3.6  - BNP 68730   - CXR from 12/25: possible pneumothorax/ pleural effusion  - patient previously on lasix 40 IV qd, holding until OMERO resolves   - ECHO: Left ventricular wall thickness is moderately increased. Global LV systolic function was moderately to severely decreased. Left ventricular ejection fraction, by visual estimation, is 30 to35%  - s/p AICD, LV lead non-functional - may need to consider revision, outpatient f/u with EP  - f/u nephro consult    #Hyperkalemia  - K:5.1 > 5.2  - one dose lokelma  - nephro following     # HTN  - c/w BB, Hydralazine, and isosorbide dinitrate 10 mg TID     # Hx of DVT  - c/w low dose Eliquis    # HLD  - c/w atorvastatin 40 qd    # Hx of depression  - c/w sertraline    # Glaucoma  -c/w latanoprost      MISC  DVT PPX: on Eliquis 2.5 bid  GI PPX: protonix  Diet: DASH renal restrictions and 1500 mL fluid restriction, consider taking off renal restrictions once patient is diuresing adequately to avoid hypokalemia  FULL CODE   86 yo female ([employer]) with PMHx of HFrEF 20% w/ AICD, severe pulm htn, RA, HTN, CKD presents for worsening shortness of breath and chest pain likely secondary to CRS type 3 from contrast induced nephropathy    # Worsening shortness of breath and intermittent atypical chest pain likely secondary to cardio renal syndrome (type 3 OMERO precipitated) from contrast induced nephropathy in the setting of CKD and HFrEF EF 20%  # OMERO on CKD 3-4  - received contrast on last admission: creat increase 2.7>5.1>4.3>3.6>3,5  - BNP 24997   - CXR from 12/25: possible pneumothorax/ pleural effusion  - patient previously on lasix 40 IV qd, holding until OMERO resolves   - ECHO: Left ventricular wall thickness is moderately increased. Global LV systolic function was moderately to severely decreased. Left ventricular ejection fraction, by visual estimation, is 30 to35%  - s/p AICD, LV lead non-functional - may need to consider revision, outpatient f/u with EP  - f/u nephro consult    #Hyperkalemia  - K:5.1 > 5.2  - one dose lokelma  - nephro following     # HTN  - c/w BB, Hydralazine, and isosorbide dinitrate 10 mg TID     # Hx of DVT  - c/w low dose Eliquis    # HLD  - c/w atorvastatin 40 qd    # Hx of depression  - c/w sertraline    # Glaucoma  -c/w latanoprost      MISC  DVT PPX: on Eliquis 2.5 bid  GI PPX: protonix  Diet: DASH renal restrictions and 1500 mL fluid restriction, consider taking off renal restrictions once patient is diuresing adequately to avoid hypokalemia  FULL CODE  Dispo: performing ToV today with dc planning for home

## 2020-12-29 NOTE — DISCHARGE NOTE PROVIDER - CARE PROVIDERS DIRECT ADDRESSES
,DirectAddress_Unknown,[email or web address]

## 2020-12-29 NOTE — PROGRESS NOTE ADULT - SUBJECTIVE AND OBJECTIVE BOX
Patient is a 85y old  Female who presents with a chief complaint of chest pain, shortness of breath (29 Dec 2020 08:28)    HPI:  85 year old female ([place]) with history of HFrEF EF 20% w/ AICD, severe pulm htn, RA, HTN, CKD presents for worsening shortness of breath and chest pain.  Her dyspnea was on exertion this morning which she states was minimal.  She states her chest pain is on and off and does not worsen on exertion.  It is substernal and nonreproducible.  At the time of my exam her chest pain was much improved.  Of note she was discharged on the 17th for shortness of breath and was discharged on lasix with the presumed diagnosis of cardiorenal syndrome.  She then returned to the ED on the 19th for chest pain and had a CTA done and was negative for PE and discharged.  She currently denies any abdominal pain, denies dizziness, she is not anuric.    In the ED she was found to have a trop of 0.04, increase of creatinine from 2.7 to 5.1 (received IV contrast in between for CTA), and BNP 00457.  She received one dose of lasix 40 IV for which her urine output increased, and chest xray was done showing no pleural effusions but cephalization.    Triage vitals: /89  HR 90  RR 18  Temp 98.4  SpO2 97% on room air (25 Dec 2020 02:57)      SUBJ:  Patient seen and examined.      MEDICATIONS  (STANDING):  apixaban 2.5 milliGRAM(s) Oral two times a day  atorvastatin 40 milliGRAM(s) Oral at bedtime  calcium acetate 667 milliGRAM(s) Oral three times a day with meals  chlorhexidine 4% Liquid 1 Application(s) Topical <User Schedule>  ferrous    sulfate 325 milliGRAM(s) Oral daily  folic acid 1 milliGRAM(s) Oral daily  furosemide    Tablet 40 milliGRAM(s) Oral every 24 hours  hydrALAZINE 25 milliGRAM(s) Oral three times a day  isosorbide   dinitrate Tablet (ISORDIL) 10 milliGRAM(s) Oral three times a day  latanoprost 0.005% Ophthalmic Solution 1 Drop(s) Both EYES at bedtime  lidocaine   Patch 1 Patch Transdermal every 24 hours  metoprolol succinate ER 25 milliGRAM(s) Oral daily  pantoprazole    Tablet 40 milliGRAM(s) Oral before breakfast  senna 2 Tablet(s) Oral at bedtime  sertraline 50 milliGRAM(s) Oral daily    MEDICATIONS  (PRN):  acetaminophen   Tablet .. 650 milliGRAM(s) Oral every 6 hours PRN Temp greater or equal to 38C (100.4F), Moderate Pain (4 - 6)            Vital Signs Last 24 Hrs  T(C): 36.6 (29 Dec 2020 05:07), Max: 37.1 (28 Dec 2020 21:22)  T(F): 97.9 (29 Dec 2020 05:07), Max: 98.7 (28 Dec 2020 21:22)  HR: 77 (29 Dec 2020 05:07) (73 - 77)  BP: 125/64 (29 Dec 2020 05:07) (109/55 - 125/64)  BP(mean): --  RR: 18 (29 Dec 2020 05:07) (18 - 18)  SpO2: 100% (29 Dec 2020 07:59) (100% - 100%)      PHYSICAL EXAM:    GEN: AAO x 3, NAD  HEENT: NC/AT, PERRL  Neck: No JVD, no bruits  CV: Reg, S1-S2, no murmur  Lungs: CTAB  Abd: Soft, non-tender  Ext: No edema      I&O's Summary    28 Dec 2020 07:01  -  29 Dec 2020 07:00  --------------------------------------------------------  IN: 600 mL / OUT: 2100 mL / NET: -1500 mL    29 Dec 2020 07:01  -  29 Dec 2020 09:33  --------------------------------------------------------  IN: 200 mL / OUT: 0 mL / NET: 200 mL    	        ECG:  < from: 12 Lead ECG (12.24.20 @ 23:13) >  Normal sinus rhythm  Non-specific intra-ventricular conduction block  Minimal voltage criteria for LVH, may be normal variant ( [place] product )  T wave abnormality, consider inferolateral ischemia  Abnormal ECG    < end of copied text >      TTE:    e< from: TTE Echo Complete w/o Contrast w/ Doppler (12.25.20 @ 10:32) >      Summary:   1. Left ventricular ejection fraction, by visual estimation, is 30 to 35%.   2. Moderately to severely decreased global left ventricular systolic function.   3. Moderately increased LV wall thickness.   4. Spectral Doppler shows pseudonormal pattern of left ventricular myocardial filling (Grade II diastolic dysfunction).   5. Mildly enlarged left atrium.   6.Normal right atrial size.   7. Mild mitral annular calcification.   8. Mild mitral valve regurgitation.   9. Mild thickening of the anterior and posterior mitral valve leaflets.  10. Mild tricuspid regurgitation.    < end of copied text >    LABS:                        8.9    9.66  )-----------( 232      ( 29 Dec 2020 07:08 )             29.0     12-29    134<L>  |  100  |  44<H>  ----------------------------<  105<H>  4.1   |  21  |  3.5<H>    Ca    8.2<L>      29 Dec 2020 07:08  Mg     2.2     12-29    TPro  5.9<L>  /  Alb  3.2<L>  /  TBili  0.3  /  DBili  x   /  AST  31  /  ALT  16  /  AlkPhos  115  12-29              BNP  RADIOLOGY & ADDITIONAL STUDIES:      IMPRESSION AND PLAN:

## 2020-12-29 NOTE — DISCHARGE NOTE PROVIDER - CARE PROVIDER_API CALL
Wade Celeste  INTERNAL MEDICINE  [address] [place] [address]  Phone: [phone]  Fax: [phone]  Established Patient  Follow Up Time: 1 week    Abdulkadir Chopra)  Cardiovascular Disease; Internal Medicine; Interventional Cardiology  [address] [place] [address]  Phone: [phone]  Fax: [phone]  Established Patient  Follow Up Time: 2 weeks

## 2020-12-29 NOTE — DISCHARGE NOTE PROVIDER - HOSPITAL COURSE
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  Her dyspnea was on exertion this morning which she states was minimal.  She states her chest pain is on and off and does not worsen on exertion.  It is substernal and nonreproducible.  At the time of my exam her chest pain was much improved.  Of note she was discharged on the 17th for shortness of breath and was discharged on lasix with the presumed diagnosis of cardiorenal syndrome.  She then returned to the ED on the 19th for chest pain and had a CTA done and was negative for PE and discharged.  She currently denies any abdominal pain, denies dizziness, she is not anuric.    In the ED she was found to have a trop of 0.04, increase of creatinine from 2.7 to 5.1 (received IV contrast in between for CTA), and BNP 46001.  She received one dose of lasix 40 IV for which her urine output increased, and chest xray was done showing no pleural effusions but cephalization.    Triage vitals: /89  HR 90  RR 18  Temp 98.4  SpO2 97% on room air    Hospital Course:     Patient was admitted for worsening shortness of breath and intermittent atypical chest pain likely secondary to cardio renal syndrome (type 3 OMERO precipitated) from contrast induced nephropathy in the setting of CKD and HFrEF EF 20%. Patient received contrast on last admission: creat trend 2.7>5.1>4.3>3.6>3.5. BNP was measured at [address] . CXR from 12/25 showed possible pneumothorax/ pleural effusion. Patient's Lasix was held as OMERO resolved.  An ECHO showed left ventricular wall thickness is moderately increased. Global LV systolic function was moderately to severely decreased. Left ventricular ejection fraction, by visual estimation, is 30 to 35%. Patient previously ha AICD implanted, LV lead deemed not working, may need to consider revision, outpatient f/u with EP    Dr. Chopra from Cardiology recommended patient's BP to be controlled with Beta-blocker, Hydralazine, and isosorbide dinitrate 10 mg TID. Patient continued on low dose Eliquis for history of DVTs.     Patient to follow up with Dr. Sloan and Cardiologist (EP) for monitoring and further treatment.

## 2020-12-29 NOTE — PROGRESS NOTE ADULT - ASSESSMENT
NICM, s/p AICD  No fluid overload clinically  Comfortable supine, w/o orthopnea  Renal function improving  Atypical CP - no evidence of ACS      Recommend:    c/w PO Lasix  Low dose Eliquis  C/w hydralazine/isosorbide combination (not on ACE-I due to renal dysfunction)  BP control.  D/c telemetry  D/c planning  outpatient follow-up with EP to consider LV lead revision.

## 2020-12-29 NOTE — DISCHARGE NOTE PROVIDER - NSDCMRMEDTOKEN_GEN_ALL_CORE_FT
acetaminophen 325 mg oral tablet: 2 tab(s) orally every 6 hours  apixaban 2.5 mg oral tablet: 1 tab(s) orally 2 times a day  calcium acetate 667 mg oral tablet: 1 tab(s) orally once a day  ferrous sulfate 325 mg (65 mg elemental iron) oral tablet: 1 tab(s) orally once a day   folic acid 1 mg oral tablet: 1 tab(s) orally once a day  furosemide 20 mg oral tablet: 1 tab(s) orally Tuesday, Thursday, Saturday, Sunday  hydrALAZINE 25 mg oral tablet: 1 tab(s) orally 3 times a day  isosorbide dinitrate 10 mg oral tablet: 1 tab(s) orally 3 times a day  latanoprost 0.005% ophthalmic solution: 1 drop(s) to each affected eye once a day (in the evening)  Lipitor 20 mg oral tablet: 1 tab(s) orally once a day  Metoprolol Succinate ER 25 mg oral tablet, extended release: 1 tab(s) orally once a day  pantoprazole 40 mg oral delayed release tablet: 1 tab(s) orally once a day (before a meal)  sertraline 50 mg oral tablet: 1 tab(s) orally once a day

## 2020-12-29 NOTE — DISCHARGE NOTE PROVIDER - PROVIDER TOKENS
PROVIDER:[TOKEN:[[id number]],FOLLOWUP:[1 week],ESTABLISHEDPATIENT:[T]],PROVIDER:[TOKEN:[[id number]],FOLLOWUP:[2 weeks],ESTABLISHEDPATIENT:[T]]

## 2020-12-29 NOTE — DISCHARGE NOTE PROVIDER - NSDCCPCAREPLAN_GEN_ALL_CORE_FT
PRINCIPAL DISCHARGE DIAGNOSIS  Diagnosis: OMERO (acute kidney injury)  Assessment and Plan of Treatment: You had a Acute Kidney Injury likely due to Contrast Induced Nephropathy. VERONICA is a rare disorder and occurs when kidney problems are caused by the use of certain contrast dyes. In most cases contrast dyes used in tests, such as CT (computerized tomography) and angiograms, have no reported problems. About 2 percent of people receiving dyes can develop VERONICA. However, the risk for VERONICA can increase for people with diabetes, a history of heart and blood diseases, and chronic kidney disease (CKD). For example, the risk of VERONICA in people with advanced CKD (glomerular filtration rate (GFR) below 30 mL/min/1.73m2), increases to 30 to 40 percent. The risk of VERONICA in people with both CKD and diabetes is 20 to 50 percent.  VERONICA is associated with a sharp decrease in kidney function over a period of 48-72 hours. The symptoms can be similar to those of kidney disease, which include feeling more tired, poor appetite, swelling in the feet and ankles, puffiness around the eyes, or dry and itchy skin. In many cases, VERONICA is reversible and people can recover  While in the hospital you were treated with IV fluids and stopping your medication called Lasix. Now that your kidney function is improving, you can resume Lasix at home. Please follow up with your PCP for further monitoring and treatment.

## 2020-12-29 NOTE — PROGRESS NOTE ADULT - ATTENDING COMMENTS
I have personally seen and examined this patient.  I have fully participated in the care of this patient.  I have reviewed pertinent clinical information, including history, physical exam, plan and note.   I have reviewed relevant imaging and diagnostic studies personally. I agree with resident note above and plan of care, edited and corrected where applicable.     Patient looked comfortable and able to ambulate without symptoms today. She is looking forward to leave the hospital. She understands that she is a high risk for readmission because of multiple morbidities. She will be f/u with her primary care physician for kidney and cardiac function monitoring.     Voiding trials ongoing before discharge    Total time for DC and coordination of care: 35  mins  Med rec completed with assigned resident

## 2020-12-29 NOTE — PROGRESS NOTE ADULT - REASON FOR ADMISSION
chest pain, shortness of breath

## 2020-12-29 NOTE — DISCHARGE NOTE NURSING/CASE MANAGEMENT/SOCIAL WORK - PATIENT PORTAL LINK FT
You can access the FollowMyHealth Patient Portal offered by [facility] by registering at the following website: [email or web address]. By joining [facility]’s FollowMyHealth portal, you will also be able to view your health information using other applications (apps) compatible with our system.

## 2020-12-31 DIAGNOSIS — I42.8 OTHER CARDIOMYOPATHIES: ICD-10-CM
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
## 2020-12-31 DIAGNOSIS — M06.9 RHEUMATOID ARTHRITIS, UNSPECIFIED: ICD-10-CM
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
## 2020-12-31 DIAGNOSIS — N17.0 ACUTE KIDNEY FAILURE WITH TUBULAR NECROSIS: ICD-10-CM

## 2020-12-31 DIAGNOSIS — F32.9 MAJOR DEPRESSIVE DISORDER, SINGLE EPISODE, UNSPECIFIED: ICD-10-CM

## 2020-12-31 DIAGNOSIS — I13.0 HYPERTENSIVE HEART AND CHRONIC KIDNEY DISEASE WITH HEART FAILURE AND STAGE 1 THROUGH STAGE 4 CHRONIC KIDNEY DISEASE, OR UNSPECIFIED CHRONIC KIDNEY DISEASE: ICD-10-CM

## 2020-12-31 DIAGNOSIS — Z79.01 LONG TERM (CURRENT) USE OF ANTICOAGULANTS: ICD-10-CM

## 2020-12-31 DIAGNOSIS — N18.4 CHRONIC KIDNEY DISEASE, STAGE 4 (SEVERE): ICD-10-CM

## 2020-12-31 DIAGNOSIS — E87.5 HYPERKALEMIA: ICD-10-CM

## 2020-12-31 DIAGNOSIS — M10.9 GOUT, UNSPECIFIED: ICD-10-CM

## 2020-12-31 DIAGNOSIS — I50.23 ACUTE ON CHRONIC SYSTOLIC (CONGESTIVE) HEART FAILURE: ICD-10-CM

## 2020-12-31 DIAGNOSIS — H40.9 UNSPECIFIED GLAUCOMA: ICD-10-CM

## 2020-12-31 DIAGNOSIS — D64.9 ANEMIA, UNSPECIFIED: ICD-10-CM

## 2020-12-31 DIAGNOSIS — K29.70 GASTRITIS, UNSPECIFIED, WITHOUT BLEEDING: ICD-10-CM

## 2020-12-31 DIAGNOSIS — Z91.09 OTHER ALLERGY STATUS, OTHER THAN TO DRUGS AND BIOLOGICAL SUBSTANCES: ICD-10-CM

## 2020-12-31 DIAGNOSIS — R33.9 RETENTION OF URINE, UNSPECIFIED: ICD-10-CM

## 2020-12-31 DIAGNOSIS — Z95.0 PRESENCE OF CARDIAC PACEMAKER: ICD-10-CM

## 2020-12-31 DIAGNOSIS — E83.39 OTHER DISORDERS OF PHOSPHORUS METABOLISM: ICD-10-CM

## 2020-12-31 DIAGNOSIS — I27.20 PULMONARY HYPERTENSION, UNSPECIFIED: ICD-10-CM

## 2020-12-31 DIAGNOSIS — T82.190A OTHER MECHANICAL COMPLICATION OF CARDIAC ELECTRODE, INITIAL ENCOUNTER: ICD-10-CM

## 2020-12-31 DIAGNOSIS — Z86.718 PERSONAL HISTORY OF OTHER VENOUS THROMBOSIS AND EMBOLISM: ICD-10-CM

## 2021-01-01 NOTE — PROGRESS NOTE ADULT - ASSESSMENT
Mom reports that Juan Pablo is coughing,congestion worse than sibling.  Last night did notice slight retractions and breathing faster was at 40 a minute.will check again but was not that fast a little bit ago was more normal. Denies wheezing or raspy noise.  Mom aware of struggling needs to be seen where they are at. Mom did not feel he a fever.  Wet diapers normal. Feeding well.    Discussed best to be seen vs traveling all way home from [place].  To monitor closely and seek medical care.   84 yo F PMH HTN, CKD 4, recent DVT/PE (thought provoked by travel, completed 6mo a/c and no longer on Eliquis) CHF s/p PPM, sigmoid diverticulitis treated with antibiotics p/w lower abdominal pain x 1 D duration. CTAP revealed sigmoid diverticulitis.    # Acute sigmoid diverticulitis- improving  - ID - Po Flagyl 500 mg q12h, Po Cipro 250 mg q12h Duration 14 days  - d/c vanco and pratik (due to rapid response 1/11 with T 102.5 and leukocytosis)  - was on cipro and flagyl IV before   - Pt  tolerating soft diet. Advance to regular    #Asymptomatic bacteruria  - UA 1/12 neg. UCx 1/12 neg. Another UCx 1/12 10-49k VRE likely contaminant.  -Per ID, no need for tx      #Acute on chronic diastolic heart failure- improving  -99% RA  - Vascular congestion on CXR, crackles on physical exam, BNP 30,198  -Cardio recs aib.  - C/w po lasix 40. Lasix restarted 1/11 (rapid response for SOB due to CHF exacerbation). Home lasix was stopped on admission given slightly worsening kidney function.  - Cr stable 1.7 today. Monitor  - 2-D echo 11/5: LVEF 55-60%. GIIDD. Mild pulm HTN. LVH    #Atypical chest discomfort, SOB, wheezing- resolved on nebulizers     ACS very unlikely  Cardio recs abi- trop stable 0.02, mildly positive troponin  in setting of dehydration, infection, CKD    hx of PE, V/Q scan 1/11- low prob PE      # OMERO on CKD 4- improving  - Cr baseline is 1.5. Today 1.7 stable  - was likely pre-renal; improved on IVF, but pt volume overloaded and restarted lasix.    # Chronic normocytic anemia- stable  - baseline hb 9-10.  9.1 today  - not taking iron pills d/t insurance issues.     # Gout flare  -  add solumedrol 60 mg ivpb 1 dose  -oral prednisone 20 qd x 3 days tomorrow       #DVT  Eliquis d/cameron although patient has R soleal DVT due risk outweigh benefits of treatment.      DVT ppx: Heparin 5000 q8h  Diet- regular fiber restrict, DASH/TLC.   Physiatry- SNF or home with VN.  Pending PT- pt have not ambulated with walker for many days. At home ambulates with walker    Anticipate d/c tomorrow 84 yo F PMH HTN, CKD 4, recent DVT/PE (thought provoked by travel, completed 6mo a/c and no longer on Eliquis) CHF s/p PPM, sigmoid diverticulitis treated with antibiotics p/w lower abdominal pain x 1 D duration. CTAP revealed sigmoid diverticulitis.    # Acute sigmoid diverticulitis- improving  - ID - Po Flagyl 500 mg q12h, Po Cipro 250 mg q12h Duration 14 days  - d/c vanco and pratik (due to rapid response 1/11 with T 102.5 and leukocytosis)  - was on cipro and flagyl IV before   - Pt  tolerating soft diet. Advance to regular    #Asymptomatic bacteruria  - UA 1/12 neg. UCx 1/12 neg. Another UCx 1/12 10-49k VRE likely contaminant.  -Per ID, no need for tx      #Acute on chronic diastolic heart failure- improving  -99% RA  - Vascular congestion on CXR, crackles on physical exam, BNP 30,198  -Cardio recs abi.  - C/w po lasix 40. Lasix restarted 1/11 (rapid response for SOB due to CHF exacerbation). Home lasix was stopped on admission given slightly worsening kidney function.  - Cr stable 1.7 today. Monitor  - 2-D echo 11/5: LVEF 55-60%. GIIDD. Mild pulm HTN. LVH    #Atypical chest discomfort, SOB, wheezing- resolved on nebulizers     ACS very unlikely  Cardio recs aib- trop stable 0.02, mildly positive troponin  in setting of dehydration, infection, CKD    hx of PE, V/Q scan 1/11- low prob PE      # OMERO on CKD 4- improving  - Cr baseline is 1.5. Today 1.7 stable  - was likely pre-renal; improved on IVF, but pt volume overloaded and restarted lasix.    # Chronic normocytic anemia- stable  - baseline hb 9-10.  9.1 today  - not taking iron pills d/t insurance issues.     # Gout flare  -  add solumedrol 60 mg ivpb 1 dose  -oral prednisone 20 qd x 3 days tomorrow       #DVT  Eliquis d/cameron although patient has R soleal DVT due risk outweigh benefits of treatment.    #Hypomagnesemia 1.4- replete IV    DVT ppx: Heparin 5000 q8h  Diet- regular fiber restrict, DASH/TLC.   Physiatry- SNF or home with VN.  Pending PT- pt have not ambulated with walker for many days. At home ambulates with walker    Anticipate d/c tomorrow 82 yo F PMH HTN, CKD 4, recent DVT/PE (thought provoked by travel, completed 6mo a/c and no longer on Eliquis) CHF s/p PPM, sigmoid diverticulitis treated with antibiotics p/w lower abdominal pain x 1 D duration. CTAP revealed sigmoid diverticulitis.    # Acute sigmoid diverticulitis- improving  - ID - Po Flagyl 500 mg q12h, Po Cipro 250 mg q12h Duration 14 days  - d/c vanco and pratik (due to rapid response 1/11 with T 102.5 and leukocytosis)  - was on cipro and flagyl IV before   - Pt  tolerating soft diet. Advance to regular    #Asymptomatic bacteruria  - UA 1/12 neg. UCx 1/12 neg. Another UCx 1/12 10-49k VRE likely contaminant.  -Per ID, no need for tx      #Acute on chronic diastolic heart failure- improving  -99% RA  - Vascular congestion on CXR, crackles on physical exam, BNP 30,198  -Cardio recs abi.  - C/w po lasix 40. Lasix restarted 1/11 (rapid response for SOB due to CHF exacerbation). Home lasix was stopped on admission given slightly worsening kidney function.  - Cr stable 1.7 today. Monitor  - 2-D echo 11/5: LVEF 55-60%. GIIDD. Mild pulm HTN. LVH    #Atypical chest discomfort, SOB, wheezing- resolved on nebulizers     ACS very unlikely  Cardio recs abi- trop stable 0.02, mildly positive troponin  in setting of dehydration, infection, CKD    hx of PE, V/Q scan 1/11- low prob PE      # OMERO on CKD 4- improving  Nephro recs abi  - Cr baseline is 1.5. Today 1.7 stable  - was likely pre-renal; improved on IVF, but pt volume overloaded and restarted lasix.  - urine sodium, urea, cr        # Chronic normocytic anemia- stable  - baseline hb 9-10.  9.1 today  - not taking iron pills d/t insurance issues.   - Nephro- Iron studies prior to considering outpatient Epogen shots    # Gout flare  -  add solumedrol 60 mg ivpb 1 dose  -oral prednisone 20 qd x 3 days tomorrow       #DVT  Eliquis d/cameron although patient has R soleal DVT due risk outweigh benefits of treatment.    #Hypomagnesemia 1.4- replete IV    DVT ppx: Heparin 5000 q8h  Diet- regular fiber restrict, DASH/TLC.   Physiatry- SNF or home with VN.  Pending PT- pt have not ambulated with walker for many days. At home ambulates with walker    Anticipate d/c tomorrow

## 2021-01-06 NOTE — DISCHARGE NOTE PROVIDER - NSDCHC_MEDRECSTATUS_GEN_ALL_CORE
Left message for patient to return call.   Admission Reconciliation is Completed  Discharge Reconciliation is Completed

## 2021-02-17 ENCOUNTER — APPOINTMENT (OUTPATIENT)
Dept: CARDIOLOGY | Facility: CLINIC | Age: 86
End: 2021-02-17

## 2021-02-19 ENCOUNTER — APPOINTMENT (OUTPATIENT)
Dept: CARDIOLOGY | Facility: CLINIC | Age: 86
End: 2021-02-19

## 2021-02-19 NOTE — ED ADULT NURSE NOTE - NS ED NURSE RECORD ANOTHER HT AND WT
Ongoing SW/CM Assessment/Plan of Care Note     See SW/CM flowsheets for goals and other objective data.    Progress note:  Cardiac cath today        CM/SW team to continue to follow for discharge needs.       No

## 2021-03-01 ENCOUNTER — APPOINTMENT (OUTPATIENT)
Dept: CARDIOLOGY | Facility: CLINIC | Age: 86
End: 2021-03-01

## 2021-05-26 NOTE — ED ADULT TRIAGE NOTE - PATIENT ON (OXYGEN DELIVERY METHOD)
Advance Care Planning   Ambulatory ACP Specialist Patient Outreach    Date:  5/26/2021    ACP Specialist:  Aarti Argueta LPN    Outreach call to patient in follow-up to ACP Specialist referral from:    [x] PCP  [] Provider   [] Ambulatory Care Management [] Other     For:                  [x] Continued Conversation for ACP decision making / Goals of Care             [] Code Status Discussion             [] Completion of Adv Directive             [] Completion of Portable DNR order             [] Other (Specify)    Date Referral Received: 5/20/21    Today's Outreach:  [] First   [x] Second  [] Third                                           Third outreach made by [x]  phone  [] email []   [facility]     Intervention:  [x] Spoke with Patient   [] Left VM requesting return call      Outcome: Spoke with patient. She does wish to have ACP conversation. Appointment with specialist scheduled for 5/28/21 at 11AM.     Next Step:   [x] ACP scheduled conversation  [] Outreach again in one week               [] Email / Mail ACP Info Sheets  [] Email / Mail Advance Directive   []  Closing referral.  Routing closure to referring provider/staff and to ACP Specialist .      Thank you for this referral. nasal cannula

## 2021-06-10 ENCOUNTER — APPOINTMENT (OUTPATIENT)
Dept: VASCULAR SURGERY | Facility: CLINIC | Age: 86
End: 2021-06-10
Payer: MEDICARE

## 2021-06-10 VITALS
HEART RATE: 56 BPM | BODY MASS INDEX: 23.04 KG/M2 | HEIGHT: 63 IN | SYSTOLIC BLOOD PRESSURE: 136 MMHG | DIASTOLIC BLOOD PRESSURE: 69 MMHG | WEIGHT: 130 LBS | TEMPERATURE: 97.3 F

## 2021-06-10 PROCEDURE — 93880 EXTRACRANIAL BILAT STUDY: CPT

## 2021-06-10 PROCEDURE — 99214 OFFICE O/P EST MOD 30 MIN: CPT

## 2021-06-10 NOTE — ASSESSMENT
[FreeTextEntry1] : 84 y/o female with asymptomatic carotid stenosis, duplex today revealed patent ICA bilaterally.\par \par No surgical intervention is needed and I will see her back in one years time for repeat carotid duplex.

## 2021-06-10 NOTE — HISTORY OF PRESENT ILLNESS
[FreeTextEntry1] : Ms. Marrufo is an 85 year-old female with carotid stenosis, presents for routine follow up.

## 2021-06-18 NOTE — PROGRESS NOTE ADULT - SUBJECTIVE AND OBJECTIVE BOX
Patient was seen and examined. Spoke with RN. Chart reviewed.  pain in right ear    No events overnight.  Vital Signs Last 24 Hrs  T(F): 97.9 (09 Feb 2020 14:16), Max: 100.8 (08 Feb 2020 20:29)  HR: 76 (09 Feb 2020 14:16) (70 - 83)  BP: 135/70 (09 Feb 2020 14:16) (135/70 - 173/94)  SpO2: 98% (08 Feb 2020 20:13) (98% - 98%)  MEDICATIONS  (STANDING):  apixaban 2.5 milliGRAM(s) Oral two times a day  atorvastatin 80 milliGRAM(s) Oral at bedtime  calcitriol   Capsule 0.25 MICROGram(s) Oral daily  ciprofloxacin  0.3% Otic Solution 1 Drop(s) Both Ears two times a day  fluticasone propionate 50 MICROgram(s)/spray Nasal Spray 1 Spray(s) Both Nostrils two times a day  furosemide    Tablet 40 milliGRAM(s) Oral daily  metoprolol succinate ER 25 milliGRAM(s) Oral daily  pantoprazole    Tablet 40 milliGRAM(s) Oral before breakfast  sertraline 50 milliGRAM(s) Oral daily    MEDICATIONS  (PRN):  acetaminophen   Tablet .. 650 milliGRAM(s) Oral every 6 hours PRN Temp greater or equal to 38C (100.4F), Mild Pain (1 - 3)  benzocaine 15 mG/menthol 3.6 mG (Sugar-Free) Lozenge 1 Lozenge Oral every 4 hours PRN Sore Throat  methocarbamol 500 milliGRAM(s) Oral three times a day PRN severe pain    Labs:                        10.8   9.10  )-----------( 220      ( 09 Feb 2020 06:36 )             34.1                         10.3   8.99  )-----------( 196      ( 08 Feb 2020 06:33 )             33.1     09 Feb 2020 06:36    137    |  100    |  47     ----------------------------<  106    5.1     |  24     |  2.4    08 Feb 2020 06:33    141    |  105    |  52     ----------------------------<  76     4.8     |  20     |  2.3      Ca    9.2        09 Feb 2020 06:36  Ca    9.0        08 Feb 2020 06:33  Mg     2.0       08 Feb 2020 06:33    TPro  6.6    /  Alb  3.6    /  TBili  1.2    /  DBili  x      /  AST  24     /  ALT  10     /  AlkPhos  110    09 Feb 2020 06:36  TPro  6.3    /  Alb  3.5    /  TBili  0.4    /  DBili  x      /  AST  21     /  ALT  9      /  AlkPhos  108    08 Feb 2020 06:33            Radiology:    General: comfortable, NAD  Neurology: A&Ox3, nonfocal  Head:  Normocephalic, atraumatic  ENT:  Mucosa moist, no ulcerations  Neck:  Supple, no JVD,   Skin: no breakdowns (as per RN)  Resp: CTA B/L  CV: RRR, S1S2,   GI: Soft, NT, bowel sounds  MS: No edema, + peripheral pulses, FROM all 4 extremity No respiratory distress. No stridor, Lungs sounds clear with good aeration bilaterally.

## 2021-10-16 NOTE — ED ADULT NURSE NOTE - NSSEPSISNEWALTERMENTAL_ED_A_ED
[FreeTextEntry1] : \par 38 yo F pmhx seasonal allergies, HLD, cold sores for CPE\par \par \par HLD/TG- 7/20 Tchol 154  LDL 81 HDL 53\par -low fat diet, exercise advised\par -check lipids\par \par hx hair loss- chronic, stable\par -advised to avoid use of hair chemicals\par -7/21 iron studies/TSH wnl- check repeat\par -states seen by derm 3/21 for hair loss and with FBSE done, advised trial of minoxidil foam -use pending. F/U pending\par \par allergic rhinitis, hx smell/taste loss - unclear etiology but getting less.  c/o hearing loss - notable cerumen on exam\par -followed by [facility], seen 3/21 with nl fiberoptic exam, hx negative covid testing. Advised Medrol marko (pending) and check CT scan (pending).  To also f/u re: hearing loss\par -on Claritin and Flonase prn\par -advised to avoid qtips, use OTC wax drops with cotton\par -advised avoid use of ear buds as able\par \par hx cold sores-\par -Valtrex prn\par \par MISC:  Continued social distancing and measure for covid19 prevention encouraged.  \par -7/20 covid19 AB: negative\par -vaccine advised, declines\par -gets weekly pcr for work\par \par \par HCM\par -check screening labs; agreeable to STD/HIV screening - slip given\par -STD and pregnancy prevention with regular use of condoms counseled\par -declines flu shot\par -hx Tdap 2017\par -hx hep B series\par -hx negative PAP 1/18 by GYN per pt, referral for new given per request\par -followed by derm, yearly screening exam advised.  Regular use of sun block for skin cancer prevention advised.\par -yearly eye screening advised, hx LASIK\par -yearly dental screening advised\par \par \par Pt's cell: [phone]\par Pt declines f/u appt, yearly CPE and f/u as needed advised.\par  No

## 2021-11-05 NOTE — ED PROVIDER NOTE - NS_EDPROVIDERDISPOUSERTYPE_ED_A_ED
Pt is going to call his insurance company then call me and let me know which DME company he wants to use. Its a new machine re-start, will fax everything. Michael Leigh     Pt called office - LS   Send to [facility] in [place]. [phone]     11/10/21-Faxed demographic, ins card, first appt dictation and sleep study to [facility] [phone]. Pt is aware.  Austin Peraza Attending Attestation (For Attendings USE Only)...

## 2021-11-07 ENCOUNTER — INPATIENT (INPATIENT)
Facility: HOSPITAL | Age: 86
LOS: 1 days | Discharge: HOME HEALTH | End: 2021-11-09
Attending: SURGERY | Admitting: SURGERY
Payer: MEDICARE

## 2021-11-07 VITALS
SYSTOLIC BLOOD PRESSURE: 165 MMHG | TEMPERATURE: 98 F | RESPIRATION RATE: 18 BRPM | DIASTOLIC BLOOD PRESSURE: 71 MMHG | HEART RATE: 60 BPM | WEIGHT: 123.9 LBS | HEIGHT: 63 IN

## 2021-11-07 DIAGNOSIS — Z95.0 PRESENCE OF CARDIAC PACEMAKER: Chronic | ICD-10-CM

## 2021-11-07 DIAGNOSIS — Z90.49 ACQUIRED ABSENCE OF OTHER SPECIFIED PARTS OF DIGESTIVE TRACT: Chronic | ICD-10-CM

## 2021-11-07 DIAGNOSIS — Z90.89 ACQUIRED ABSENCE OF OTHER ORGANS: Chronic | ICD-10-CM

## 2021-11-07 DIAGNOSIS — Z90.710 ACQUIRED ABSENCE OF BOTH CERVIX AND UTERUS: Chronic | ICD-10-CM

## 2021-11-07 LAB
ALBUMIN SERPL ELPH-MCNC: 4.4 G/DL — SIGNIFICANT CHANGE UP (ref 3.5–5.2)
ALP SERPL-CCNC: 102 U/L — SIGNIFICANT CHANGE UP (ref 30–115)
ALT FLD-CCNC: 17 U/L — SIGNIFICANT CHANGE UP (ref 0–41)
ANION GAP SERPL CALC-SCNC: 16 MMOL/L — HIGH (ref 7–14)
APTT BLD: 38.7 SEC — SIGNIFICANT CHANGE UP (ref 27–39.2)
AST SERPL-CCNC: 33 U/L — SIGNIFICANT CHANGE UP (ref 0–41)
BASOPHILS # BLD AUTO: 0.02 K/UL — SIGNIFICANT CHANGE UP (ref 0–0.2)
BASOPHILS NFR BLD AUTO: 0.3 % — SIGNIFICANT CHANGE UP (ref 0–1)
BILIRUB SERPL-MCNC: 0.2 MG/DL — SIGNIFICANT CHANGE UP (ref 0.2–1.2)
BUN SERPL-MCNC: 39 MG/DL — HIGH (ref 10–20)
CALCIUM SERPL-MCNC: 9.7 MG/DL — SIGNIFICANT CHANGE UP (ref 8.5–10.1)
CHLORIDE SERPL-SCNC: 103 MMOL/L — SIGNIFICANT CHANGE UP (ref 98–110)
CO2 SERPL-SCNC: 21 MMOL/L — SIGNIFICANT CHANGE UP (ref 17–32)
CREAT SERPL-MCNC: 2.5 MG/DL — HIGH (ref 0.7–1.5)
EOSINOPHIL # BLD AUTO: 0.4 K/UL — SIGNIFICANT CHANGE UP (ref 0–0.7)
EOSINOPHIL NFR BLD AUTO: 5.8 % — SIGNIFICANT CHANGE UP (ref 0–8)
GLUCOSE SERPL-MCNC: 84 MG/DL — SIGNIFICANT CHANGE UP (ref 70–99)
HCT VFR BLD CALC: 36.2 % — LOW (ref 37–47)
HGB BLD-MCNC: 11 G/DL — LOW (ref 12–16)
IMM GRANULOCYTES NFR BLD AUTO: 0.3 % — SIGNIFICANT CHANGE UP (ref 0.1–0.3)
INR BLD: 1.23 RATIO — SIGNIFICANT CHANGE UP (ref 0.65–1.3)
LYMPHOCYTES # BLD AUTO: 2.07 K/UL — SIGNIFICANT CHANGE UP (ref 1.2–3.4)
LYMPHOCYTES # BLD AUTO: 30 % — SIGNIFICANT CHANGE UP (ref 20.5–51.1)
MCHC RBC-ENTMCNC: 28.3 PG — SIGNIFICANT CHANGE UP (ref 27–31)
MCHC RBC-ENTMCNC: 30.4 G/DL — LOW (ref 32–37)
MCV RBC AUTO: 93.1 FL — SIGNIFICANT CHANGE UP (ref 81–99)
MONOCYTES # BLD AUTO: 0.78 K/UL — HIGH (ref 0.1–0.6)
MONOCYTES NFR BLD AUTO: 11.3 % — HIGH (ref 1.7–9.3)
NEUTROPHILS # BLD AUTO: 3.6 K/UL — SIGNIFICANT CHANGE UP (ref 1.4–6.5)
NEUTROPHILS NFR BLD AUTO: 52.3 % — SIGNIFICANT CHANGE UP (ref 42.2–75.2)
NRBC # BLD: 0 /100 WBCS — SIGNIFICANT CHANGE UP (ref 0–0)
PLATELET # BLD AUTO: 190 K/UL — SIGNIFICANT CHANGE UP (ref 130–400)
POTASSIUM SERPL-MCNC: 4.5 MMOL/L — SIGNIFICANT CHANGE UP (ref 3.5–5)
POTASSIUM SERPL-SCNC: 4.5 MMOL/L — SIGNIFICANT CHANGE UP (ref 3.5–5)
PROT SERPL-MCNC: 7.8 G/DL — SIGNIFICANT CHANGE UP (ref 6–8)
PROTHROM AB SERPL-ACNC: 14.1 SEC — HIGH (ref 9.95–12.87)
RBC # BLD: 3.89 M/UL — LOW (ref 4.2–5.4)
RBC # FLD: 13.9 % — SIGNIFICANT CHANGE UP (ref 11.5–14.5)
SODIUM SERPL-SCNC: 140 MMOL/L — SIGNIFICANT CHANGE UP (ref 135–146)
WBC # BLD: 6.89 K/UL — SIGNIFICANT CHANGE UP (ref 4.8–10.8)
WBC # FLD AUTO: 6.89 K/UL — SIGNIFICANT CHANGE UP (ref 4.8–10.8)

## 2021-11-07 PROCEDURE — 99222 1ST HOSP IP/OBS MODERATE 55: CPT

## 2021-11-07 PROCEDURE — 72170 X-RAY EXAM OF PELVIS: CPT | Mod: 26

## 2021-11-07 PROCEDURE — 70450 CT HEAD/BRAIN W/O DYE: CPT | Mod: 26,MA

## 2021-11-07 PROCEDURE — 71250 CT THORAX DX C-: CPT | Mod: 26,MA

## 2021-11-07 PROCEDURE — 99285 EMERGENCY DEPT VISIT HI MDM: CPT

## 2021-11-07 PROCEDURE — 74176 CT ABD & PELVIS W/O CONTRAST: CPT | Mod: 26,MA

## 2021-11-07 PROCEDURE — 72125 CT NECK SPINE W/O DYE: CPT | Mod: 26,MA

## 2021-11-07 RX ORDER — ATORVASTATIN CALCIUM 80 MG/1
20 TABLET, FILM COATED ORAL AT BEDTIME
Refills: 0 | Status: DISCONTINUED | OUTPATIENT
Start: 2021-11-07 | End: 2021-11-09

## 2021-11-07 RX ORDER — LATANOPROST 0.05 MG/ML
1 SOLUTION/ DROPS OPHTHALMIC; TOPICAL
Refills: 0 | Status: DISCONTINUED | OUTPATIENT
Start: 2021-11-07 | End: 2021-11-09

## 2021-11-07 RX ORDER — OXYCODONE HYDROCHLORIDE 5 MG/1
5 TABLET ORAL EVERY 6 HOURS
Refills: 0 | Status: DISCONTINUED | OUTPATIENT
Start: 2021-11-07 | End: 2021-11-09

## 2021-11-07 RX ORDER — SERTRALINE 25 MG/1
50 TABLET, FILM COATED ORAL DAILY
Refills: 0 | Status: DISCONTINUED | OUTPATIENT
Start: 2021-11-07 | End: 2021-11-09

## 2021-11-07 RX ORDER — CHLORHEXIDINE GLUCONATE 213 G/1000ML
1 SOLUTION TOPICAL DAILY
Refills: 0 | Status: DISCONTINUED | OUTPATIENT
Start: 2021-11-07 | End: 2021-11-09

## 2021-11-07 RX ORDER — TIMOLOL 0.5 %
1 DROPS OPHTHALMIC (EYE)
Refills: 0 | Status: DISCONTINUED | OUTPATIENT
Start: 2021-11-07 | End: 2021-11-09

## 2021-11-07 RX ORDER — FUROSEMIDE 40 MG
20 TABLET ORAL DAILY
Refills: 0 | Status: DISCONTINUED | OUTPATIENT
Start: 2021-11-07 | End: 2021-11-09

## 2021-11-07 RX ORDER — ACETAMINOPHEN 500 MG
650 TABLET ORAL EVERY 6 HOURS
Refills: 0 | Status: DISCONTINUED | OUTPATIENT
Start: 2021-11-07 | End: 2021-11-09

## 2021-11-07 RX ORDER — PANTOPRAZOLE SODIUM 20 MG/1
40 TABLET, DELAYED RELEASE ORAL
Refills: 0 | Status: DISCONTINUED | OUTPATIENT
Start: 2021-11-07 | End: 2021-11-09

## 2021-11-07 RX ORDER — HYDRALAZINE HCL 50 MG
25 TABLET ORAL THREE TIMES A DAY
Refills: 0 | Status: DISCONTINUED | OUTPATIENT
Start: 2021-11-07 | End: 2021-11-09

## 2021-11-07 RX ORDER — APIXABAN 2.5 MG/1
2.5 TABLET, FILM COATED ORAL
Refills: 0 | Status: DISCONTINUED | OUTPATIENT
Start: 2021-11-07 | End: 2021-11-09

## 2021-11-07 RX ORDER — LATANOPROST 0.05 MG/ML
1 SOLUTION/ DROPS OPHTHALMIC; TOPICAL
Qty: 0 | Refills: 0 | DISCHARGE

## 2021-11-07 RX ORDER — FOLIC ACID 0.8 MG
1 TABLET ORAL
Qty: 0 | Refills: 0 | DISCHARGE

## 2021-11-07 RX ORDER — ISOSORBIDE MONONITRATE 60 MG/1
30 TABLET, EXTENDED RELEASE ORAL DAILY
Refills: 0 | Status: DISCONTINUED | OUTPATIENT
Start: 2021-11-07 | End: 2021-11-09

## 2021-11-07 RX ORDER — ACETAMINOPHEN 500 MG
650 TABLET ORAL ONCE
Refills: 0 | Status: COMPLETED | OUTPATIENT
Start: 2021-11-07 | End: 2021-11-07

## 2021-11-07 RX ORDER — METOPROLOL TARTRATE 50 MG
25 TABLET ORAL DAILY
Refills: 0 | Status: DISCONTINUED | OUTPATIENT
Start: 2021-11-07 | End: 2021-11-09

## 2021-11-07 RX ADMIN — Medication 650 MILLIGRAM(S): at 12:41

## 2021-11-07 RX ADMIN — Medication 650 MILLIGRAM(S): at 23:37

## 2021-11-07 NOTE — H&P ADULT - NSHPPHYSICALEXAM_GEN_ALL_CORE
Vitals:   T(C): 36.6 (11-07-21 @ 17:31), Max: 36.9 (11-07-21 @ 11:53)  HR: 60 (11-07-21 @ 17:31) (60 - 60)  BP: 173/83 (11-07-21 @ 17:31) (164/73 - 173/83)  RR: 18 (11-07-21 @ 17:31) (18 - 19)  SpO2: 100% (11-07-21 @ 17:31) (98% - 100%)    TRAUMA LEVEL OF ACTIVATION: TRAUMA CODE/ALERT/CONSULT  MECHANISM OF INJURY:     PHYSICAL EXAM:     Primary Survey:    A - airway intact  B - bilateral breath sounds and good chest rise  C - palpable pulses in all extremities  D - GCS 15 on arrival  Exposure obtained    Secondary Survey:  General: NAD  HEENT: Normocephalic, atraumatic  Neck: Soft, midline trachea.  Chest: some left sided chest wall tenderness.   Cardiac: S1, S2, RRR  Respiratory: Bilateral breath sounds, clear and equal bilaterally  Abdomen: Soft, non-distended, non-tender, no rebound, no guarding, no masses palpated  Groin: Normal appearing  Ext: palp radial b/l UE, b/l DP palp in Lower Extrem, motor and sensory grossly intact in all 4 extremities  Back: TTP over the L spine, no palpable runoff/stepoff/deformity

## 2021-11-07 NOTE — H&P ADULT - ASSESSMENT
86 year old female with PMHx of DVT on Eliquis, CHF, pulm HTN, CKD, s/p AICD, RA, HTN, HLD presents to ED s/p fall 3 days ago. states that she was getting out of bed an lost footing and fell on her buttock. she is c/o back pain. -HT, -LOC.   On physical exam she has L spine tenderness and some left sided chest tenderness  On imaging she was found to have  left posterior 12th rib fracture and  L1 left transverse process fracture    Plan:   Admit to trauma   per neurosurgery TLSO brace and f/u outpatient  pain control  incentive spirometry  DVT/GI prophylaxis  SCDs, IS  encourage ambulation  PT/rehab  f/u outpatient for right thyroid nodule     plans discussed with Dr Dominguez 86 year old female with PMHx of DVT on Eliquis, CHF, pulm HTN, CKD, s/p AICD, RA, HTN, HLD presents to ED s/p fall 3 days ago. states that she was getting out of bed an lost footing and fell on her buttock. she is c/o back pain. -HT, -LOC.   On physical exam she has L spine tenderness and some left sided chest tenderness  On imaging she was found to have  left posterior 12th rib fracture and  L1 left transverse process fracture. She is pulling 1L on IS    Plan:   Admit to trauma   per neurosurgery TLSO brace and f/u outpatient  pain control  incentive spirometry  DVT/GI prophylaxis  SCDs, IS  encourage ambulation  PT/rehab  f/u outpatient for right thyroid nodule     plans discussed with Dr Dominguez 86 year old female with PMHx of DVT on Eliquis, CHF, pulm HTN, CKD, s/p AICD, RA, HTN, HLD presents to ED s/p fall 3 days ago. states that she was getting out of bed an lost footing and fell on her buttock. she is c/o back pain. -HT, -LOC.   On physical exam she has L spine tenderness and some left sided chest tenderness  On imaging she was found to have  left posterior 12th rib fracture and  L1 left transverse process fracture. She is pulling 1L on IS    Plan:   Admit to trauma   per neurosurgery TLSO brace and f/u outpatient  pain control  incentive spirometry  DVT/GI prophylaxis  SCDs, IS  encourage ambulation  PT/rehab  f/u outpatient for right thyroid nodule     Senior Note  I have personally examined and evaluated the patient  I agree with the above plan and note, and I have edited where appropriate  Surgical Attending aware and agrees with plan  plans discussed with Dr Dominguez

## 2021-11-07 NOTE — H&P ADULT - NSHPLABSRESULTS_GEN_ALL_CORE
LABS  CBC (11-07 @ 18:05)                              11.0<L>                         6.89    )----------------(  190        52.3  % Neutrophils, 30.0  % Lymphocytes, ANC: 3.60                                36.2<L>    BMP (11-07 @ 18:05)             140     |  103     |  39<H> 		Ca++ --      Ca 9.7                ---------------------------------( 84    		Mg --                 4.5     |  21      |  2.5<H>			Ph --        LFTs (11-07 @ 18:05)      TPro 7.8 / Alb 4.4 / TBili 0.2 / DBili -- / AST 33 / ALT 17 / AlkPhos 102    Coags (11-07 @ 18:05)  aPTT 38.7 / INR 1.23 / PT 14.10<H>    IMAGING:   < from: CT Cervical Spine No Cont (11.07.21 @ 19:00) >    IMPRESSION:    1.  No acute fracture or significant subluxation of the cervical spine.    2.Multilevel degenerative changes of the spine.    3.  Partially imaged 1.7 cm right thyroid lobe nodule, previously measuring 1.6 cm in 2015. Follow-up with thyroid ultrasound may be obtained for further evaluation.    < end of copied text >    < from: CT Head No Cont (11.07.21 @ 19:00) >    IMPRESSION:    No acute intracranial hemorrhage, mass-effect or midline shift.    < end of copied text >    < from: CT Abdomen and Pelvis No Cont (11.07.21 @ 14:35) >      IMPRESSION:  1. Acute, displaced left posterior 12th rib fracture near costovertebral angle; no pneumothorax.  2. Acute, nondisplaced L1 left transverse process fractures;no retroperitoneal hematoma.    --- End of Report ---    < end of copied text >

## 2021-11-07 NOTE — ED ADULT NURSE NOTE - OBJECTIVE STATEMENT
pt is a 87 yo female sts fell off bed friday night, sts hit back. denies ht,  + Eliquis, co pain to lower back. denies numbness/tingling/n/v/d. no obvious bleeding/deformities. full ROM.

## 2021-11-07 NOTE — ED PROVIDER NOTE - PROGRESS NOTE DETAILS
Spoke with trauma surgery ; head ct pending. Neurosx called as well for transverse spinous process fx- will provide consultation.

## 2021-11-07 NOTE — ED ADULT TRIAGE NOTE - CHIEF COMPLAINT QUOTE
pt was attempting ot stand on Friday night and fell on buttock c/o lower back pain denies hitting head denies loc pt on Eliquis; as per pt she is ambulating as she normally would

## 2021-11-07 NOTE — ED PROVIDER NOTE - SKIN, MLM
+ bruise noted along lower back; remainder of visualized skin normal color for race, warm, dry and intact. No evidence of rash.

## 2021-11-07 NOTE — H&P ADULT - ATTENDING COMMENTS
Trauma Attending H&P Attestation    Patient seen and evaluated with the trauma team in the trauma bay upon arrival. All pertinent labs and radiographic imaging reviewed, pending final reports. Outpatient medications reviewed, including the presence of anticoagulants, if applicable. I agree with the resident's note above, including the physical exam findings, assessment and plan as documented with the following adjustments.     Trauma Level: [ ] Code  [ ] Alert  [ x] Consult [ ] Transfer in  Activation by:  [x ] ED physician [ ] EMS  Intubated in Field? [ ] Yes [x ] No  Intubated in ED? [ ] Yes [x ] No  Intubated in Trauma [place]? [ ] Yes [x ] No    RACHEL SUMMERS Patient is a 86y old  Female who presents with a chief complaint of 12th rib fracture, L1 left transverse process fracture     Patient presented with GCS [15 ]  upon arrival to the trauma bay.  Allergies  Keflex (Swelling)  Intolerances    PAST MEDICAL & SURGICAL HISTORY:  Chronic kidney disease  Pacemaker  Hypertension  Gout  Diverticulitis sigmoid  Diastolic heart failure  Rheumatoid arthritis  DVT, lower extremity  Artificial pacemaker  History of appendectomy  History of tonsillectomy  History of hysterectomy    On AC/Antiplatelets [x ] Yes [ ] No              [x ] NOVACs, [ ] Coumadin, [ ] ASA, [ ] Antiplatelets     Vital Signs Last 24 Hrs  T(C): 36.6 (07 Nov 2021 17:31), Max: 36.9 (07 Nov 2021 11:53)  T(F): 97.9 (07 Nov 2021 17:31), Max: 98.5 (07 Nov 2021 11:53)  HR: 60 (07 Nov 2021 17:31) (60 - 60)  BP: 173/83 (07 Nov 2021 17:31) (164/73 - 173/83)  BP(mean): --  RR: 18 (07 Nov 2021 17:31) (18 - 19)  SpO2: 100% (07 Nov 2021 17:31) (98% - 100%)    Assessment: Rib fracture                      TP fracture    PLAN  - Admit to Trauma service   - supportive care  - GI/DVT prophylaxis  - pain management  - repeat studies as needed  - complete and follow up on trauma work up included but not limites to                          [ x] CXR [x ] PXR [x ] Extremities X-RAYs                          [ x] NCHCT [x ] C-Spine CT [x ] CT Chest [x ] CT Abdomen/Pelvis                          [x ] FAST [ ] Other                          [x ] Trauma Labs                          [ ] Toxicology   - Follow up Consults  [ x] Neurosurgery [ ] Orthopaedics [ ] Plastics [ ] Fascial/OMFS [ ] Opthalmology [ ] Medicine [ ] Cardiology  - IV ABx give as indicated [ ] Yes [x ] No  - Tetanus given as indicated [ ] Yes [ x] No    Ailyn Dominguez MD, FACS  Trauma/ACS/Surgical Critical Care Attending

## 2021-11-07 NOTE — H&P ADULT - HISTORY OF PRESENT ILLNESS
86 year old female with PMHx of DVT on Eliquis, CHF, pulm HTN, CKD, s/p AICD, RA, HTN, HLD presents to ED s/p fall 3 days ago. states that she was getting out of bed an lost footing and fell on her buttock. she is c/o back pain. -HT, -LOC. Denies any headache, dizziness, neck pain, chest pain, SOB, abdominal pain, or pain in her extremities. Patient states that she lives with her daughter and she was able to ambulate after the fall. She ambulates with a walker.

## 2021-11-07 NOTE — ED PROVIDER NOTE - CARE PLAN
Principal Discharge DX:	Rib fracture  Secondary Diagnosis:	Fracture of transverse process of lumbar vertebra  Secondary Diagnosis:	Status post fall   1

## 2021-11-07 NOTE — ED PROVIDER NOTE - OBJECTIVE STATEMENT
87 y/o F, PMHx DVT (on Eliquis), CHF, Pulm. HTN, CKD & s/p AICD, presents to the ED s/p mechanical fall three days ago. Patient was getting out of bed when she lost her footing and fell on to her buttocks and left side. She admits to lower back pain and left sided rib discomfort. She denies any head trauma and was helped up by her daughter. She has been ambulating without difficulty with her walker since. She denies hip pain, abdominal pain, chest pain, dyspnea and additional injuries.

## 2021-11-07 NOTE — ED PROVIDER NOTE - ATTENDING CONTRIBUTION TO CARE
86F PMH CHF EF 20% s/p AICD, DVT on eliquis, HTN, CKD, pulm HTN, p/w L back pain s/p slip and fall getting out of bed on friday. sharp constant pain worse w movement. ambulating w walker at home. denies chi or loc. lives w daughter, has hha. no numbness, weakness. no cp, sob. no fever, cough. no abd pain, nvdc. no urinary sx. no ams.    on exam, AFVSS, well abi nad, ncat, eomi, perrla, mmm, lctab, rrr nl s1s2 no mrg, abd soft ntnd, no cvat, bruising to L posterior ribs and L paraspinal lumbar back both ttp, aaox3, CN 2-12 intact, No nystagmus.  5/5 motor x 4 ext, SILT x 4 extremities, No facial droop or slurred speech.  No midline C/T/L tenderness to palpation or step off. , no le edema or calf ttp,     a/p; s/p fall, will do labs, CT scans pain control r/o acute fx, re-eval

## 2021-11-07 NOTE — CONSULT NOTE ADULT - SUBJECTIVE AND OBJECTIVE BOX
HPI: 87 y/o F, PMHx DVT (on Eliquis), CHF, Pulm. HTN, CKD & s/p AICD, presents to the ED s/p mechanical fall three days ago. Patient was getting out of bed when she lost her footing and fell on to her buttocks and left side. She admits to lower back pain and left sided rib discomfort. She denies any head trauma and was helped up by her daughter. She has been ambulating without difficulty with her walker since. She denies hip pain, abdominal pain, chest pain, dyspnea and additional injuries.    PAST MEDICAL & SURGICAL HISTORY:  Chronic kidney disease    Pacemaker    Hypertension    Gout    Diverticulitis  sigmoid    Diastolic heart failure    Rheumatoid arthritis    DVT, lower extremity    Artificial pacemaker    History of appendectomy    History of tonsillectomy    History of hysterectomy        FAMILY HISTORY:  FH: arthritis  sister    Allergies    Keflex (Swelling)    Intolerances    REVIEW OF SYSTEMS : As listed in HPI  Head and Neck:   Cardio :   Pum :  GI:  Neuro:     MEDICATIONS  (STANDING):    MEDICATIONS  (PRN):    Anticoagulation:    Vital Signs Last 24 Hrs  T(C): 36.6 (07 Nov 2021 17:31), Max: 36.9 (07 Nov 2021 11:53)  T(F): 97.9 (07 Nov 2021 17:31), Max: 98.5 (07 Nov 2021 11:53)  HR: 60 (07 Nov 2021 17:31) (60 - 60)  BP: 173/83 (07 Nov 2021 17:31) (164/73 - 173/83)  BP(mean): --  RR: 18 (07 Nov 2021 17:31) (18 - 19)  SpO2: 100% (07 Nov 2021 17:31) (98% - 100%)    Physical Exam :  General :   A&O x  Tongue midline  Facial features symmetric, No droop  Speech clear and appropriate, no slur   Pt speaking in full sentences   Follows all commands   Occular :   PERRLA, EOMI   Motor :   MAEx4   LUE  RUE  LLE  LLE   No spinal point tenderness   Sensory :  Intact bilaterally   Cerbellar :    Hoffmans :     Devices : AICD     LABS:                        11.0   6.89  )-----------( 190      ( 07 Nov 2021 18:05 )             36.2     11-07    140  |  103  |  39<H>  ----------------------------<  84  4.5   |  21  |  2.5<H>    Ca    9.7      07 Nov 2021 18:05    TPro  7.8  /  Alb  4.4  /  TBili  0.2  /  DBili  x   /  AST  33  /  ALT  17  /  AlkPhos  102  11-07    PT/INR - ( 07 Nov 2021 18:05 )   PT: 14.10 sec;   INR: 1.23 ratio         PTT - ( 07 Nov 2021 18:05 )  PTT:38.7 sec    RADIOLOGY & ADDITIONAL STUDIES:  < from: CT Abdomen and Pelvis No Cont (11.07.21 @ 14:35) >    IMPRESSION:  1. Acute, displaced left posterior 12th rib fracture near costovertebral angle; no pneumothorax.  2. Acute, nondisplaced L1 left transverse process fractures;no retroperitoneal hematoma.    --- End of Report ---    MADIE KRUSE MD; Attending Radiologist  This document has been electronically signed. Nov 7 2021  4:51PM    < end of copied text >    Assessment / Plan: 86y F 3 days s/p mechanical fall with -HT - LOC. CT Abdomen / Pelvis shows acute displaced 12th rib fx w/o pneumothorax and acute nondisplaced L1 L TP Fx.   - No acute neurosurgical intervention indicated at this time   -TLSO brace upon eventual discharge  - Ok for OOB w/ assistance and PT/OT  - pain control      HPI: 85 y/o F, PMHx DVT (on Eliquis), CHF, Pulm. HTN, CKD & s/p AICD, presents to the ED s/p mechanical fall three days ago. Patient was getting out of bed when she lost her footing and fell on to her buttocks and left side. She admits to lower back pain and left sided rib discomfort. She denies any head trauma and was helped up by her daughter. She has been ambulating without difficulty with her walker since. She denies hip pain, abdominal pain, chest pain, dyspnea and additional injuries.    PAST MEDICAL & SURGICAL HISTORY:  Chronic kidney disease    Pacemaker    Hypertension    Gout    Diverticulitis  sigmoid    Diastolic heart failure    Rheumatoid arthritis    DVT, lower extremity    Artificial pacemaker    History of appendectomy    History of tonsillectomy    History of hysterectomy    FAMILY HISTORY:  FH: arthritis  sister    Allergies    Keflex (Swelling)    Intolerances    REVIEW OF SYSTEMS : As listed in HPI  Head and Neck:   Cardio :   Pum :  GI:  Neuro:     MEDICATIONS  (STANDING):    MEDICATIONS  (PRN):    Anticoagulation:    Vital Signs Last 24 Hrs  T(C): 36.6 (07 Nov 2021 17:31), Max: 36.9 (07 Nov 2021 11:53)  T(F): 97.9 (07 Nov 2021 17:31), Max: 98.5 (07 Nov 2021 11:53)  HR: 60 (07 Nov 2021 17:31) (60 - 60)  BP: 173/83 (07 Nov 2021 17:31) (164/73 - 173/83)  BP(mean): --  RR: 18 (07 Nov 2021 17:31) (18 - 19)  SpO2: 100% (07 Nov 2021 17:31) (98% - 100%)    Physical Exam :  General : Sitting up in bed in no apparent distress   A&O x 3  Tongue midline  Facial features symmetric, No droop  Speech clear and appropriate, no slur   Pt speaking in full sentences   Follows all commands   Occular :   PERRLA, EOMI   Motor :   MAEx4   5/5 strength to all extremities   5/5 hand  bilaterally   - SLR bilaterally   + spinal point tenderness ton palpation along thoracic spine   +bruising near CVA   5/5 Dorsiflexion / Plantarflexion   Sensory :  Intact bilaterally     Hoffmans : negative bilaterally    Devices : AICD     LABS:                        11.0   6.89  )-----------( 190      ( 07 Nov 2021 18:05 )             36.2     11-07    140  |  103  |  39<H>  ----------------------------<  84  4.5   |  21  |  2.5<H>    Ca    9.7      07 Nov 2021 18:05    TPro  7.8  /  Alb  4.4  /  TBili  0.2  /  DBili  x   /  AST  33  /  ALT  17  /  AlkPhos  102  11-07    PT/INR - ( 07 Nov 2021 18:05 )   PT: 14.10 sec;   INR: 1.23 ratio         PTT - ( 07 Nov 2021 18:05 )  PTT:38.7 sec    RADIOLOGY & ADDITIONAL STUDIES:  < from: CT Abdomen and Pelvis No Cont (11.07.21 @ 14:35) >    IMPRESSION:  1. Acute, displaced left posterior 12th rib fracture near costovertebral angle; no pneumothorax.  2. Acute, nondisplaced L1 left transverse process fractures;no retroperitoneal hematoma.    --- End of Report ---    MADIE KRUSE MD; Attending Radiologist  This document has been electronically signed. Nov 7 2021  4:51PM    < end of copied text >    Assessment / Plan: 86y F 3 days s/p mechanical fall with -HT - LOC. CT Abdomen / Pelvis shows acute displaced 12th rib fx w/o pneumothorax and acute nondisplaced L1 L TP Fx. PT ambulating without difficulty with walker since fall. Denies new  incontinence, constipation, N/V, paresthesias, weakness or other sx at this time. MAEx4 5/5 strength to all extremities no foot drop - SLR bilaterally.   - No acute neurosurgical intervention indicated at this time   -TLSO brace upon eventual discharge  - Ok for OOB w/ assistance and PT/OT  - pain control   - may f/u outpatient in office with Dr. Mazariegos in 1-2 weeks after d/c

## 2021-11-08 ENCOUNTER — TRANSCRIPTION ENCOUNTER (OUTPATIENT)
Age: 86
End: 2021-11-08

## 2021-11-08 DIAGNOSIS — R09.89 OTHER SPECIFIED SYMPTOMS AND SIGNS INVOLVING THE CIRCULATORY AND RESPIRATORY SYSTEMS: ICD-10-CM

## 2021-11-08 LAB
ANION GAP SERPL CALC-SCNC: 13 MMOL/L — SIGNIFICANT CHANGE UP (ref 7–14)
ANION GAP SERPL CALC-SCNC: 17 MMOL/L — HIGH (ref 7–14)
BASOPHILS # BLD AUTO: 0 K/UL — SIGNIFICANT CHANGE UP (ref 0–0.2)
BASOPHILS # BLD AUTO: 0.02 K/UL — SIGNIFICANT CHANGE UP (ref 0–0.2)
BASOPHILS NFR BLD AUTO: 0 % — SIGNIFICANT CHANGE UP (ref 0–1)
BASOPHILS NFR BLD AUTO: 0.2 % — SIGNIFICANT CHANGE UP (ref 0–1)
BUN SERPL-MCNC: 41 MG/DL — HIGH (ref 10–20)
BUN SERPL-MCNC: 48 MG/DL — HIGH (ref 10–20)
CALCIUM SERPL-MCNC: 8.1 MG/DL — LOW (ref 8.5–10.1)
CALCIUM SERPL-MCNC: 8.8 MG/DL — SIGNIFICANT CHANGE UP (ref 8.5–10.1)
CHLORIDE SERPL-SCNC: 101 MMOL/L — SIGNIFICANT CHANGE UP (ref 98–110)
CHLORIDE SERPL-SCNC: 104 MMOL/L — SIGNIFICANT CHANGE UP (ref 98–110)
CO2 SERPL-SCNC: 19 MMOL/L — SIGNIFICANT CHANGE UP (ref 17–32)
CO2 SERPL-SCNC: 19 MMOL/L — SIGNIFICANT CHANGE UP (ref 17–32)
CREAT SERPL-MCNC: 2.6 MG/DL — HIGH (ref 0.7–1.5)
CREAT SERPL-MCNC: 2.9 MG/DL — HIGH (ref 0.7–1.5)
EOSINOPHIL # BLD AUTO: 0.34 K/UL — SIGNIFICANT CHANGE UP (ref 0–0.7)
EOSINOPHIL # BLD AUTO: 0.39 K/UL — SIGNIFICANT CHANGE UP (ref 0–0.7)
EOSINOPHIL NFR BLD AUTO: 4.2 % — SIGNIFICANT CHANGE UP (ref 0–8)
EOSINOPHIL NFR BLD AUTO: 4.4 % — SIGNIFICANT CHANGE UP (ref 0–8)
GLUCOSE SERPL-MCNC: 110 MG/DL — HIGH (ref 70–99)
GLUCOSE SERPL-MCNC: 133 MG/DL — HIGH (ref 70–99)
HCT VFR BLD CALC: 25.9 % — LOW (ref 37–47)
HCT VFR BLD CALC: 30.7 % — LOW (ref 37–47)
HGB BLD-MCNC: 8 G/DL — LOW (ref 12–16)
HGB BLD-MCNC: 9.9 G/DL — LOW (ref 12–16)
IMM GRANULOCYTES NFR BLD AUTO: 0.2 % — SIGNIFICANT CHANGE UP (ref 0.1–0.3)
LYMPHOCYTES # BLD AUTO: 1.54 K/UL — SIGNIFICANT CHANGE UP (ref 1.2–3.4)
LYMPHOCYTES # BLD AUTO: 17.4 % — LOW (ref 20.5–51.1)
LYMPHOCYTES # BLD AUTO: 3.01 K/UL — SIGNIFICANT CHANGE UP (ref 1.2–3.4)
LYMPHOCYTES # BLD AUTO: 37.6 % — SIGNIFICANT CHANGE UP (ref 20.5–51.1)
MAGNESIUM SERPL-MCNC: 1.6 MG/DL — LOW (ref 1.8–2.4)
MAGNESIUM SERPL-MCNC: 1.6 MG/DL — LOW (ref 1.8–2.4)
MCHC RBC-ENTMCNC: 28.5 PG — SIGNIFICANT CHANGE UP (ref 27–31)
MCHC RBC-ENTMCNC: 29.4 PG — SIGNIFICANT CHANGE UP (ref 27–31)
MCHC RBC-ENTMCNC: 30.9 G/DL — LOW (ref 32–37)
MCHC RBC-ENTMCNC: 32.2 G/DL — SIGNIFICANT CHANGE UP (ref 32–37)
MCV RBC AUTO: 91.1 FL — SIGNIFICANT CHANGE UP (ref 81–99)
MCV RBC AUTO: 92.2 FL — SIGNIFICANT CHANGE UP (ref 81–99)
MONOCYTES # BLD AUTO: 0.38 K/UL — SIGNIFICANT CHANGE UP (ref 0.1–0.6)
MONOCYTES # BLD AUTO: 1.16 K/UL — HIGH (ref 0.1–0.6)
MONOCYTES NFR BLD AUTO: 14.5 % — HIGH (ref 1.7–9.3)
MONOCYTES NFR BLD AUTO: 4.3 % — SIGNIFICANT CHANGE UP (ref 1.7–9.3)
NEUTROPHILS # BLD AUTO: 3.46 K/UL — SIGNIFICANT CHANGE UP (ref 1.4–6.5)
NEUTROPHILS # BLD AUTO: 6.38 K/UL — SIGNIFICANT CHANGE UP (ref 1.4–6.5)
NEUTROPHILS NFR BLD AUTO: 43.3 % — SIGNIFICANT CHANGE UP (ref 42.2–75.2)
NEUTROPHILS NFR BLD AUTO: 72.2 % — SIGNIFICANT CHANGE UP (ref 42.2–75.2)
NRBC # BLD: 0 /100 WBCS — SIGNIFICANT CHANGE UP (ref 0–0)
PHOSPHATE SERPL-MCNC: 3.3 MG/DL — SIGNIFICANT CHANGE UP (ref 2.1–4.9)
PHOSPHATE SERPL-MCNC: 3.4 MG/DL — SIGNIFICANT CHANGE UP (ref 2.1–4.9)
PLATELET # BLD AUTO: 154 K/UL — SIGNIFICANT CHANGE UP (ref 130–400)
PLATELET # BLD AUTO: 174 K/UL — SIGNIFICANT CHANGE UP (ref 130–400)
POTASSIUM SERPL-MCNC: 4.4 MMOL/L — SIGNIFICANT CHANGE UP (ref 3.5–5)
POTASSIUM SERPL-MCNC: 4.7 MMOL/L — SIGNIFICANT CHANGE UP (ref 3.5–5)
POTASSIUM SERPL-SCNC: 4.4 MMOL/L — SIGNIFICANT CHANGE UP (ref 3.5–5)
POTASSIUM SERPL-SCNC: 4.7 MMOL/L — SIGNIFICANT CHANGE UP (ref 3.5–5)
RBC # BLD: 2.81 M/UL — LOW (ref 4.2–5.4)
RBC # BLD: 3.37 M/UL — LOW (ref 4.2–5.4)
RBC # FLD: 13.9 % — SIGNIFICANT CHANGE UP (ref 11.5–14.5)
RBC # FLD: 14 % — SIGNIFICANT CHANGE UP (ref 11.5–14.5)
SARS-COV-2 RNA SPEC QL NAA+PROBE: SIGNIFICANT CHANGE UP
SODIUM SERPL-SCNC: 133 MMOL/L — LOW (ref 135–146)
SODIUM SERPL-SCNC: 140 MMOL/L — SIGNIFICANT CHANGE UP (ref 135–146)
WBC # BLD: 8.01 K/UL — SIGNIFICANT CHANGE UP (ref 4.8–10.8)
WBC # BLD: 8.84 K/UL — SIGNIFICANT CHANGE UP (ref 4.8–10.8)
WBC # FLD AUTO: 8.01 K/UL — SIGNIFICANT CHANGE UP (ref 4.8–10.8)
WBC # FLD AUTO: 8.84 K/UL — SIGNIFICANT CHANGE UP (ref 4.8–10.8)

## 2021-11-08 PROCEDURE — 99232 SBSQ HOSP IP/OBS MODERATE 35: CPT

## 2021-11-08 RX ORDER — LIDOCAINE 4 G/100G
1 CREAM TOPICAL EVERY 24 HOURS
Refills: 0 | Status: DISCONTINUED | OUTPATIENT
Start: 2021-11-08 | End: 2021-11-09

## 2021-11-08 RX ORDER — SENNA PLUS 8.6 MG/1
2 TABLET ORAL AT BEDTIME
Refills: 0 | Status: DISCONTINUED | OUTPATIENT
Start: 2021-11-08 | End: 2021-11-09

## 2021-11-08 RX ADMIN — Medication 650 MILLIGRAM(S): at 06:32

## 2021-11-08 RX ADMIN — Medication 25 MILLIGRAM(S): at 06:31

## 2021-11-08 RX ADMIN — LIDOCAINE 1 PATCH: 4 CREAM TOPICAL at 11:19

## 2021-11-08 RX ADMIN — Medication 1 DROP(S): at 18:33

## 2021-11-08 RX ADMIN — LATANOPROST 1 DROP(S): 0.05 SOLUTION/ DROPS OPHTHALMIC; TOPICAL at 21:13

## 2021-11-08 RX ADMIN — Medication 650 MILLIGRAM(S): at 17:47

## 2021-11-08 RX ADMIN — APIXABAN 2.5 MILLIGRAM(S): 2.5 TABLET, FILM COATED ORAL at 06:31

## 2021-11-08 RX ADMIN — Medication 1 DROP(S): at 06:32

## 2021-11-08 RX ADMIN — SERTRALINE 50 MILLIGRAM(S): 25 TABLET, FILM COATED ORAL at 11:17

## 2021-11-08 RX ADMIN — PANTOPRAZOLE SODIUM 40 MILLIGRAM(S): 20 TABLET, DELAYED RELEASE ORAL at 06:31

## 2021-11-08 RX ADMIN — ATORVASTATIN CALCIUM 20 MILLIGRAM(S): 80 TABLET, FILM COATED ORAL at 21:14

## 2021-11-08 RX ADMIN — OXYCODONE HYDROCHLORIDE 5 MILLIGRAM(S): 5 TABLET ORAL at 06:38

## 2021-11-08 RX ADMIN — LATANOPROST 1 DROP(S): 0.05 SOLUTION/ DROPS OPHTHALMIC; TOPICAL at 09:06

## 2021-11-08 RX ADMIN — SENNA PLUS 2 TABLET(S): 8.6 TABLET ORAL at 21:14

## 2021-11-08 RX ADMIN — Medication 650 MILLIGRAM(S): at 11:16

## 2021-11-08 RX ADMIN — Medication 20 MILLIGRAM(S): at 06:31

## 2021-11-08 RX ADMIN — Medication 650 MILLIGRAM(S): at 11:21

## 2021-11-08 RX ADMIN — APIXABAN 2.5 MILLIGRAM(S): 2.5 TABLET, FILM COATED ORAL at 17:47

## 2021-11-08 RX ADMIN — Medication 25 MILLIGRAM(S): at 13:06

## 2021-11-08 RX ADMIN — Medication 650 MILLIGRAM(S): at 18:49

## 2021-11-08 RX ADMIN — Medication 25 MILLIGRAM(S): at 21:14

## 2021-11-08 RX ADMIN — ISOSORBIDE MONONITRATE 30 MILLIGRAM(S): 60 TABLET, EXTENDED RELEASE ORAL at 11:17

## 2021-11-08 NOTE — CONSULT NOTE ADULT - SUBJECTIVE AND OBJECTIVE BOX
HPI:  86 year old female with PMHx of DVT on Eliquis, CHF, pulm HTN, CKD, s/p AICD, RA, HLD presents to ED s/p fall 3 days ago. states that she was getting out of bed an lost footing and fell on her buttock. she is c/o back pain. -HT, -LOC. Denies any headache, dizziness, neck pain, chest pain, SOB, abdominal pain, or pain in her extremities. Patient states that she lives with her daughter and she was able to ambulate after the fall. She ambulates with a walker. On imaging she was found to have  left posterior 12th rib fracture and  L1 left transverse process fracture. Neurosurgery recommends  TLSO brace and f/u outpatient.       PAST MEDICAL & SURGICAL HISTORY:  Chronic kidney disease    Pacemaker    Hypertension    Gout    Diverticulitis  sigmoid    Diastolic heart failure    Rheumatoid arthritis    DVT, lower extremity    Artificial pacemaker    History of appendectomy    History of tonsillectomy    History of hysterectomy        Hospital Course:    TODAY'S SUBJECTIVE & REVIEW OF SYMPTOMS:     Constitutional WNL   Cardio WNL   Resp WNL   GI WNL  Heme WNL  Endo WNL  Skin WNL  MSK pain  Neuro WNL  Cognitive WNL  Psych WNL      MEDICATIONS  (STANDING):  acetaminophen     Tablet .. 650 milliGRAM(s) Oral every 6 hours  apixaban 2.5 milliGRAM(s) Oral two times a day  atorvastatin 20 milliGRAM(s) Oral at bedtime  chlorhexidine 4% Liquid 1 Application(s) Topical daily  furosemide    Tablet 20 milliGRAM(s) Oral daily  hydrALAZINE 25 milliGRAM(s) Oral three times a day  isosorbide   mononitrate ER Tablet (IMDUR) 30 milliGRAM(s) Oral daily  latanoprost 0.005% Ophthalmic Solution 1 Drop(s) Both EYES <User Schedule>  lidocaine   4% Patch 1 Patch Transdermal every 24 hours  metoprolol succinate ER 25 milliGRAM(s) Oral daily  pantoprazole    Tablet 40 milliGRAM(s) Oral before breakfast  senna 2 Tablet(s) Oral at bedtime  sertraline 50 milliGRAM(s) Oral daily  timolol 0.5% Solution 1 Drop(s) Both EYES two times a day    MEDICATIONS  (PRN):  oxyCODONE    IR 5 milliGRAM(s) Oral every 6 hours PRN Moderate Pain (4 - 6)      FAMILY HISTORY:  FH: arthritis  sister        Allergies    Keflex (Swelling)    Intolerances        SOCIAL HISTORY:    [  ] Etoh  [  ] Smoking  [  ] Substance abuse     Home Environment:  [   ] Home Alone  [ x  ] Lives with Family  [   ] Home Health Aid    Dwelling:  [   ] Apartment  [ x  ] Private House  [   ] Adult Home  [   ] Skilled Nursing Facility      [   ] Short Term  [   ] Long Term  [ x  ] Stairs       Elevator [   ]    FUNCTIONAL STATUS PTA: (Check all that apply)  Ambulation: [ x   ]Independent    [   ] Dependent     [   ] Non-Ambulatory  Assistive Device: [   ] SA Cane  [   ]  Q Cane  [ x  ] Walker  [   ]  Wheelchair  ADL : [  x ] Independent  [    ]  Dependent       Vital Signs Last 24 Hrs  T(C): 37.2 (08 Nov 2021 09:19), Max: 37.6 (08 Nov 2021 06:11)  T(F): 99 (08 Nov 2021 09:19), Max: 99.6 (08 Nov 2021 06:11)  HR: 63 (08 Nov 2021 09:19) (60 - 72)  BP: 131/62 (08 Nov 2021 09:19) (131/62 - 178/84)  BP(mean): --  RR: 18 (08 Nov 2021 09:19) (18 - 19)  SpO2: 98% (08 Nov 2021 09:19) (98% - 100%)      PHYSICAL EXAM: Awake & Alert  GENERAL: NAD  HEAD:  Normocephalic  CHEST/LUNG: Clear   HEART: S1S2+  ABDOMEN: Soft, Nontender  EXTREMITIES:  no calf tenderness    NERVOUS SYSTEM:  Cranial Nerves 2-12 intact [   ] Abnormal  [   ]  ROM: WFL all extremities [ x  ]  Abnormal [   ]  Motor Strength: WFL all extremities  [x   ]  Abnormal [   ]  Sensation: intact to light touch [ x  ] Abnormal [   ]    FUNCTIONAL STATUS:  Bed Mobility: Independent [   ]  Supervision [   ]  Needs Assistance [x   ]  N/A [   ]  Transfers: Independent [   ]  Supervision [   ]  Needs Assistance [ x  ]  N/A [   ]   Ambulation: Independent [   ]  Supervision [   ]  Needs Assistance [   ]  N/A [   ]  ADL: Independent [   ] Requires Assistance [   ] N/A [   ]      LABS:                        9.9    8.84  )-----------( 174      ( 08 Nov 2021 04:30 )             30.7     11-08    140  |  104  |  41<H>  ----------------------------<  133<H>  4.4   |  19  |  2.6<H>    Ca    8.8      08 Nov 2021 04:30  Phos  3.3     11-08  Mg     1.6     11-08    TPro  7.8  /  Alb  4.4  /  TBili  0.2  /  DBili  x   /  AST  33  /  ALT  17  /  AlkPhos  102  11-07    PT/INR - ( 07 Nov 2021 18:05 )   PT: 14.10 sec;   INR: 1.23 ratio         PTT - ( 07 Nov 2021 18:05 )  PTT:38.7 sec      RADIOLOGY & ADDITIONAL STUDIES:

## 2021-11-08 NOTE — DISCHARGE NOTE NURSING/CASE MANAGEMENT/SOCIAL WORK - PATIENT PORTAL LINK FT
You can access the FollowMyHealth Patient Portal offered by [facility] by registering at the following website: [email or web address]. By joining [facility]’s FollowMyHealth portal, you will also be able to view your health information using other applications (apps) compatible with our system.

## 2021-11-08 NOTE — CONSULT NOTE ADULT - ASSESSMENT
IMPRESSION: Rehab of s/p fall, multiple bone fx, left 12th rib and L1 left transverse process fx    PRECAUTIONS: [   ] Cardiac  [   ] Respiratory  [   ] Seizures [   ] Contact Isolation  [   ] Droplet Isolation  [   ] Other    Weight Bearing Status:     RECOMMENDATION: TLSO brace per neurosurgery     Out of Bed to Chair     DVT/Decubiti Prophylaxis    REHAB PLAN:     [  x  ] Bedside P/T 3-5 times a week   [ x   ]   Bedside O/T  2-3 times a week             [    ] Speech Therapy               [    ]  No Rehab Therapy Indicated   Conditioning/ROM                                    ADL  Bed Mobility                                               Conditioning/ROM  Transfers                                                     Bed Mobility  Sitting /Standing Balance                         Transfers                                        Gait Training                                               Sitting/Standing Balance  Stair Training [   ]Applicable                    Home equipment Eval                                                                        Splinting  [   ] Only      GOALS:   ADL   [ x   ]   Independent                    Transfers  [x    ] Independent                          Ambulation  [ x   ] Independent     [  x  ] With device                            [    ]  CG                                                         [    ]  CG                                                                  [    ] CG                            [    ] Min A                                                   [    ] Min A                                                              [    ] Min  A          DISCHARGE PLAN:   [    ]  Good candidate for Intensive Rehabilitation/Hospital based                                             Will tolerate 3hrs Intensive Rehab Daily                                       [     ]  Short Term Rehab in Skilled Nursing Facility                                       [     ]  Home with Outpatient or  services                                         [   x  ]  Possible Candidate for Intensive Hospital based Rehab

## 2021-11-08 NOTE — PROGRESS NOTE ADULT - SUBJECTIVE AND OBJECTIVE BOX
`TRAUMA SURGERY PROGRESS NOTE    Patient: RACHEL SUMMERS , 86y ([birth year])Female   MRN: [id number]  Location: [address]  Visit: 11-07-21 Inpatient  Date: 11-08-21 @ 08:35    Hospital Day #:2    Procedure/Dx/Injuries: LT 12th rib Fx, L1 LT Transverse process Fx      PAST MEDICAL & SURGICAL HISTORY:  Chronic kidney disease    Pacemaker    Hypertension    Gout    Diverticulitis  sigmoid    Diastolic heart failure    Rheumatoid arthritis    DVT, lower extremity    Artificial pacemaker    History of appendectomy    History of tonsillectomy    History of hysterectomy        Vitals:   T(F): 99.6 (11-08-21 @ 06:11), Max: 99.6 (11-08-21 @ 06:11)  HR: 71 (11-08-21 @ 06:11)  BP: 178/84 (11-08-21 @ 06:11)  RR: 18 (11-08-21 @ 06:11)  SpO2: 98% (11-08-21 @ 06:11)      Diet, Renal Restrictions:   For patients receiving Renal Replacement - No Protein Restr, No Conc K, No Conc Phos, Low Sodium  DASH/TLC Sodium & Cholesterol Restricted      Fluids:     I & O's:    11-07-21 @ 07:01  -  11-08-21 @ 07:00  --------------------------------------------------------  IN:  Total IN: 0 mL    OUT:    Voided (mL): 400 mL  Total OUT: 400 mL    Total NET: -400 mL        General: NAD  HEENT: Normocephalic, atraumatic  Neck: Soft, midline trachea.  Chest: some left sided chest wall tenderness.   Cardiac: S1, S2, RRR  Respiratory: Bilateral breath sounds, clear and equal bilaterally  Abdomen: Soft, non-distended, non-tender, no rebound, no guarding, no masses palpated  Groin: Normal appearing  Ext: palp radial b/l UE, b/l DP palp in Lower Extrem, motor and sensory grossly intact in all 4 extremities  Back: TTP over the L spine, no palpable runoff/stepoff/deformity    MEDICATIONS  (STANDING):  acetaminophen     Tablet .. 650 milliGRAM(s) Oral every 6 hours  apixaban 2.5 milliGRAM(s) Oral two times a day  atorvastatin 20 milliGRAM(s) Oral at bedtime  chlorhexidine 4% Liquid 1 Application(s) Topical daily  furosemide    Tablet 20 milliGRAM(s) Oral daily  hydrALAZINE 25 milliGRAM(s) Oral three times a day  isosorbide   mononitrate ER Tablet (IMDUR) 30 milliGRAM(s) Oral daily  latanoprost 0.005% Ophthalmic Solution 1 Drop(s) Both EYES <User Schedule>  metoprolol succinate ER 25 milliGRAM(s) Oral daily  pantoprazole    Tablet 40 milliGRAM(s) Oral before breakfast  sertraline 50 milliGRAM(s) Oral daily  timolol 0.5% Solution 1 Drop(s) Both EYES two times a day    MEDICATIONS  (PRN):  oxyCODONE    IR 5 milliGRAM(s) Oral every 6 hours PRN Moderate Pain (4 - 6)      DVT PROPHYLAXIS: SCDs,   GI PROPHYLAXIS: pantoprazole    Tablet 40 milliGRAM(s) Oral before breakfast    ANTICOAGULATION:   ANTIBIOTICS:           LAB/STUDIES:  Labs:  CAPILLARY BLOOD GLUCOSE                              11.0   6.89  )-----------( 190      ( 07 Nov 2021 18:05 )             36.2       Auto Neutrophil %: 52.3 % (11-07-21 @ 18:05)  Auto Immature Granulocyte %: 0.3 % (11-07-21 @ 18:05)    11-07    140  |  103  |  39<H>  ----------------------------<  84  4.5   |  21  |  2.5<H>      Calcium, Total Serum: 9.7 mg/dL (11-07-21 @ 18:05)      LFTs:             7.8  | 0.2  | 33       ------------------[102     ( 07 Nov 2021 18:05 )  4.4  | x    | 17          Lipase:x      Amylase:x             Coags:     14.10  ----< 1.23    ( 07 Nov 2021 18:05 )     38.7

## 2021-11-08 NOTE — PHYSICAL THERAPY INITIAL EVALUATION ADULT - PHYSICAL ASSIST/NONPHYSICAL ASSIST: GAIT, REHAB EVAL
verbal cues vitals: at rest in chair 99/50 68 98% on RA; in chair post amb 123/61 60 97% on RA./verbal cues

## 2021-11-08 NOTE — PHYSICAL THERAPY INITIAL EVALUATION ADULT - GENERAL OBSERVATIONS, REHAB EVAL
[id number] pm. 85 y/o F rec'd/left in b/s chair, nad, +chair alarm. A+Ox4, pleasant and motivated. reporting 6-8/10 pain (RN made aware) in the areas of acute fx's (at rest and with mobility): LT post 12th rib and LT L1 TP. pt's rollator walker does not have good breaks.

## 2021-11-08 NOTE — PHYSICAL THERAPY INITIAL EVALUATION ADULT - LEVEL OF INDEPENDENCE: SIT/STAND, REHAB EVAL
keeps feet in front of her due to the inability to flex knees past ~ 110 deg extension 2/2 arthritis; post LOB; slides forward on the chair; feet slide forward when pt attempts to stand up/moderate assist (50% patients effort)

## 2021-11-09 ENCOUNTER — TRANSCRIPTION ENCOUNTER (OUTPATIENT)
Age: 86
End: 2021-11-09

## 2021-11-09 VITALS
RESPIRATION RATE: 18 BRPM | SYSTOLIC BLOOD PRESSURE: 128 MMHG | HEART RATE: 60 BPM | DIASTOLIC BLOOD PRESSURE: 63 MMHG | TEMPERATURE: 97 F | OXYGEN SATURATION: 98 %

## 2021-11-09 LAB
ANION GAP SERPL CALC-SCNC: 15 MMOL/L — HIGH (ref 7–14)
BUN SERPL-MCNC: 47 MG/DL — HIGH (ref 10–20)
CALCIUM SERPL-MCNC: 8.3 MG/DL — LOW (ref 8.5–10.1)
CHLORIDE SERPL-SCNC: 99 MMOL/L — SIGNIFICANT CHANGE UP (ref 98–110)
CO2 SERPL-SCNC: 18 MMOL/L — SIGNIFICANT CHANGE UP (ref 17–32)
COVID-19 NUCLEOCAPSID GAM AB INTERP: NEGATIVE — SIGNIFICANT CHANGE UP
COVID-19 NUCLEOCAPSID TOTAL GAM ANTIBODY RESULT: 0.08 INDEX — SIGNIFICANT CHANGE UP
COVID-19 SPIKE DOMAIN AB INTERP: POSITIVE
COVID-19 SPIKE DOMAIN ANTIBODY RESULT: >250 U/ML — HIGH
CREAT SERPL-MCNC: 2.7 MG/DL — HIGH (ref 0.7–1.5)
GLUCOSE SERPL-MCNC: 111 MG/DL — HIGH (ref 70–99)
POTASSIUM SERPL-MCNC: 4 MMOL/L — SIGNIFICANT CHANGE UP (ref 3.5–5)
POTASSIUM SERPL-SCNC: 4 MMOL/L — SIGNIFICANT CHANGE UP (ref 3.5–5)
SARS-COV-2 IGG+IGM SERPL QL IA: 0.08 INDEX — SIGNIFICANT CHANGE UP
SARS-COV-2 IGG+IGM SERPL QL IA: >250 U/ML — HIGH
SARS-COV-2 IGG+IGM SERPL QL IA: NEGATIVE — SIGNIFICANT CHANGE UP
SARS-COV-2 IGG+IGM SERPL QL IA: POSITIVE
SODIUM SERPL-SCNC: 132 MMOL/L — LOW (ref 135–146)

## 2021-11-09 PROCEDURE — 99232 SBSQ HOSP IP/OBS MODERATE 35: CPT

## 2021-11-09 PROCEDURE — 99223 1ST HOSP IP/OBS HIGH 75: CPT

## 2021-11-09 RX ORDER — SODIUM CHLORIDE 9 MG/ML
500 INJECTION INTRAMUSCULAR; INTRAVENOUS; SUBCUTANEOUS ONCE
Refills: 0 | Status: COMPLETED | OUTPATIENT
Start: 2021-11-09 | End: 2021-11-09

## 2021-11-09 RX ORDER — ACETAMINOPHEN 500 MG
2 TABLET ORAL
Qty: 0 | Refills: 0 | DISCHARGE
Start: 2021-11-09

## 2021-11-09 RX ORDER — MAGNESIUM SULFATE 500 MG/ML
2 VIAL (ML) INJECTION ONCE
Refills: 0 | Status: COMPLETED | OUTPATIENT
Start: 2021-11-09 | End: 2021-11-09

## 2021-11-09 RX ADMIN — Medication 650 MILLIGRAM(S): at 11:13

## 2021-11-09 RX ADMIN — PANTOPRAZOLE SODIUM 40 MILLIGRAM(S): 20 TABLET, DELAYED RELEASE ORAL at 06:27

## 2021-11-09 RX ADMIN — Medication 25 MILLIGRAM(S): at 06:26

## 2021-11-09 RX ADMIN — Medication 25 MILLIGRAM(S): at 13:18

## 2021-11-09 RX ADMIN — LATANOPROST 1 DROP(S): 0.05 SOLUTION/ DROPS OPHTHALMIC; TOPICAL at 11:12

## 2021-11-09 RX ADMIN — Medication 650 MILLIGRAM(S): at 06:27

## 2021-11-09 RX ADMIN — LIDOCAINE 1 PATCH: 4 CREAM TOPICAL at 11:13

## 2021-11-09 RX ADMIN — Medication 20 MILLIGRAM(S): at 06:26

## 2021-11-09 RX ADMIN — Medication 50 GRAM(S): at 06:39

## 2021-11-09 RX ADMIN — SODIUM CHLORIDE 500 MILLILITER(S): 9 INJECTION INTRAMUSCULAR; INTRAVENOUS; SUBCUTANEOUS at 00:18

## 2021-11-09 RX ADMIN — Medication 650 MILLIGRAM(S): at 00:17

## 2021-11-09 RX ADMIN — Medication 1 DROP(S): at 06:28

## 2021-11-09 RX ADMIN — ISOSORBIDE MONONITRATE 30 MILLIGRAM(S): 60 TABLET, EXTENDED RELEASE ORAL at 11:13

## 2021-11-09 RX ADMIN — SERTRALINE 50 MILLIGRAM(S): 25 TABLET, FILM COATED ORAL at 11:13

## 2021-11-09 RX ADMIN — APIXABAN 2.5 MILLIGRAM(S): 2.5 TABLET, FILM COATED ORAL at 06:27

## 2021-11-09 NOTE — CONSULT NOTE ADULT - SUBJECTIVE AND OBJECTIVE BOX
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 - HT and - LOC. Remembers entire event. No preceding symptoms of CP, SOB, dizziness, focal weakness, or headaches. No seizure-like activity.     In the ED, pt diagnosed with left 12 rib fracture and L1 transverse process fracture. NS consulted and asked for TLSO brace, no acute surgical intervention. PT evaluated pt and states she can be discharged home services.     Pt currently only c/o mild back pain. Denies any difficulty with breathing, SOB, ABD pain, or N/V/D. ROS is negative except as above.     FMHx: Reviewed, not relevant    Physical exam shows pt in NAD. VSS, afebrile, not hypoxic on RA. A&Ox3. Head is atraumatic. Neuro shows a non-focal exam. Motor strength/sensation is intact. CTA B/L with no W/C/R; Mild TTP over the left chest wall. RRR, no M/G/R. ABD is soft and non-tender, normoactive BSs. Right/Left LE with shortening and external rotation, pulses and sensation intact, and TTP over the hip. No rashes. Labs and radiology as above.     Back and Left chest wall pain from left 12 rib fracture and L1 transverse process fracture s/p mechanical fall. Cleared by PT for home with services. NS asking for TLSO brace. Will need pain meds. Incentive spirometry. Rest as per primary team.     HFrEF w/ pulm HTN s/p AICD. Not in overload on exam. C.w home meds. 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 Pt's renal function has actually slightly improved from her known baseline. C/w home meds. FU with PCP.     Normocytic anemia, slightly below baseline. No Symptoms. Pt denies bleeding symptoms. FU with PCP.     Hx of DVT. C/w Apixaban. FU with PCP    Hx of RA, HTN, and HLD. C/w home meds. Pt can FU with PCP.       Dispo: Pt is medically stable, no acute interventions are warranted at this time. C/w home meds. Rib and transverse process fracture as per primary team, PT states pt can be sent home with service.         Thank you for the consult

## 2021-11-09 NOTE — CONSULT NOTE ADULT - REASON FOR ADMISSION
12th rib fracture, L1 left transverse process fracture
12th rib fracture, L1 left transverse process fracture

## 2021-11-09 NOTE — DISCHARGE NOTE PROVIDER - NSDCHHCONTACT_GEN_ALL_CORE_FT
As certified below, I, or a nurse practitioner or physician assistant working with me, had a face-to-face encounter that meets the physician face-to-face encounter requirements. cath lab RN Aurora WAGONER

## 2021-11-09 NOTE — DISCHARGE NOTE PROVIDER - HOSPITAL COURSE
87 y/o F with pmhx of CHF, pulm HTN, DVT, CKD, AICD presented to the ED as a trauma consult s/p fall -HT, -LOC, -AC. Patient was found on imaging to have left 12th rib fracture and L1 left transverse process fracture.   Patient was admtted for pain control   Nsx- NTD, can follow up outpatient   Pulling 1500 on IS  pain controlled.   Worked with PT   Choosing to be discharged home with HHA reinstated.   Vitally and hemodynamically stable.

## 2021-11-09 NOTE — PROGRESS NOTE ADULT - SUBJECTIVE AND OBJECTIVE BOX
TRAUMA SURGERY PROGRESS NOTE    Patient: RACHEL SUMMERS , 86y ([birth year])Female   MRN: [id number]  Location: [address]  Visit: 11-07-21 Inpatient  Date: 11-09-21 @ 02:21    Hospital Day #:3    Procedure/Dx/Injuries: LT 12th rib Fx, L1 LT Transverse process Fx    Events of past 24 hours: PT was seen by PT [facility] PHY[facility]ATRY.     PAST MEDICAL & SURGICAL HISTORY:  Chronic kidney disease    Pacemaker    Hypertension    Gout    Diverticulitis  sigmoid    Diastolic heart failure    Rheumatoid arthritis    DVT, lower extremity    Artificial pacemaker    History of appendectomy    History of tonsillectomy    History of hysterectomy        Vitals:   T(F): 98.2 (11-08-21 @ 21:53), Max: 99.6 (11-08-21 @ 06:11)  HR: 60 (11-08-21 @ 21:53)  BP: 113/66 (11-08-21 @ 21:53)  RR: 18 (11-08-21 @ 21:53)  SpO2: 99% (11-08-21 @ 13:13)      Diet, Renal Restrictions:   For patients receiving Renal Replacement - No Protein Restr, No Conc K, No Conc Phos, Low Sodium  DASH/TLC Sodium & Cholesterol Restricted      Fluids:     I & O's:    11-07-21 @ 07:01  -  11-08-21 @ 07:00  --------------------------------------------------------  IN:  Total IN: 0 mL    OUT:    Voided (mL): 400 mL  Total OUT: 400 mL    Total NET: -400 mL        General: NAD  HEENT: Normocephalic, atraumatic  Neck: Soft, midline trachea.  Chest: some left sided chest wall tenderness.   Cardiac: S1, S2, RRR  Respiratory: Bilateral breath sounds, clear and equal bilaterally  Abdomen: Soft, non-distended, non-tender, no rebound, no guarding, no masses palpated  Groin: Normal appearing  Ext: palp radial b/l UE, b/l DP palp in Lower Extrem, motor and sensory grossly intact in all 4 extremities  Back: TTP over the L spine, no palpable runoff/stepoff/deformity    MEDICATIONS  (STANDING):  acetaminophen     Tablet .. 650 milliGRAM(s) Oral every 6 hours  apixaban 2.5 milliGRAM(s) Oral two times a day  atorvastatin 20 milliGRAM(s) Oral at bedtime  chlorhexidine 4% Liquid 1 Application(s) Topical daily  furosemide    Tablet 20 milliGRAM(s) Oral daily  hydrALAZINE 25 milliGRAM(s) Oral three times a day  isosorbide   mononitrate ER Tablet (IMDUR) 30 milliGRAM(s) Oral daily  latanoprost 0.005% Ophthalmic Solution 1 Drop(s) Both EYES <User Schedule>  lidocaine   4% Patch 1 Patch Transdermal every 24 hours  magnesium sulfate  IVPB 2 Gram(s) IV Intermittent once  metoprolol succinate ER 25 milliGRAM(s) Oral daily  pantoprazole    Tablet 40 milliGRAM(s) Oral before breakfast  senna 2 Tablet(s) Oral at bedtime  sertraline 50 milliGRAM(s) Oral daily  timolol 0.5% Solution 1 Drop(s) Both EYES two times a day    MEDICATIONS  (PRN):  oxyCODONE    IR 5 milliGRAM(s) Oral every 6 hours PRN Moderate Pain (4 - 6)      DVT PROPHYLAXIS: SCDs,   GI PROPHYLAXIS: pantoprazole    Tablet 40 milliGRAM(s) Oral before breakfast    ANTICOAGULATION:   ANTIBIOTICS:     LAB/STUDIES:  Labs:  CAPILLARY BLOOD GLUCOSE                              8.0    8.01  )-----------( 154      ( 08 Nov 2021 21:41 )             25.9       Auto Neutrophil %: 43.3 % (11-08-21 @ 21:41)  Auto Immature Granulocyte %: 0.2 % (11-08-21 @ 21:41)  Auto Neutrophil %: 72.2 % (11-08-21 @ 04:30)    11-08    133<L>  |  101  |  48<H>  ----------------------------<  110<H>  4.7   |  19  |  2.9<H>      Calcium, Total Serum: 8.1 mg/dL (11-08-21 @ 21:41)      LFTs:             7.8  | 0.2  | 33       ------------------[102     ( 07 Nov 2021 18:05 )  4.4  | x    | 17          Lipase:x      Amylase:x             Coags:     14.10  ----< 1.23    ( 07 Nov 2021 18:05 )     38.7

## 2021-11-09 NOTE — PROGRESS NOTE ADULT - ASSESSMENT
Assessment and Plan:   Assessment:  · Assessment	  86 year old female with PMHx of DVT on Eliquis, CHF, pulm HTN, CKD, s/p AICD, RA, HTN, HLD presents to ED s/p fall 3 days ago. States that she was getting out of bed an lost footing and fell on her buttock. she is c/o back pain. -HT, -LOC.   On physical exam she has L spine tenderness and some left sided chest tenderness  On imaging she was found to have  left posterior 12th rib fracture and  L1 left transverse process fracture. She is pulling 1L on IS    Plan:   per neurosurgery TLSO brace and f/u outpatient  pain control  incentive spirometry  DVT/GI prophylaxis  SCDs, IS  encourage ambulation  PT/rehab  D/C Planning w SW  f/u outpatient for right thyroid nodule   
Assessment:  · Assessment	  86 year old female with PMHx of DVT on Eliquis, CHF, pulm HTN, CKD, s/p AICD, RA, HTN, HLD presents to ED s/p fall . States that she was getting out of bed an lost footing and fell on her buttock. she is c/o back pain. -HT, -LOC.   On physical exam she has L spine tenderness and some left sided chest tenderness  On imaging she was found to have  left posterior 12th rib fracture and  L1 left transverse process fracture. She is pulling 1L on IS    Plan:   per neurosurgery TLSO brace and f/u outpatient  pain control  incentive spirometry  DVT/GI prophylaxis  SCDs, IS  encourage ambulation  D/C Planning w SW  f/u outpatient for right thyroid nodule     Spectra [phone]

## 2021-11-09 NOTE — OCCUPATIONAL THERAPY INITIAL EVALUATION ADULT - PERTINENT HX OF CURRENT PROBLEM, REHAB EVAL
86 year old female with PMHx of DVT on Eliquis, CHF, pulm HTN, CKD, s/p AICD, RA, HTN, HLD presents to ED s/p fall . States that she was getting out of bed an lost footing and fell on her buttock. she is c/o back pain. -HT, -LOC.

## 2021-11-09 NOTE — DISCHARGE NOTE PROVIDER - NSDCCPCAREPLAN_GEN_ALL_CORE_FT
PRINCIPAL DISCHARGE DIAGNOSIS  Diagnosis: Rib fracture  Assessment and Plan of Treatment: You fell and fractured your left 12th rib. You may take Tylenol as needed for pain control. You may use lidocaine patch once daily for additional pain control. This may be purchased over the counter.   Please continue to use incentive spirometry device at home. 10x per hour.      SECONDARY DISCHARGE DIAGNOSES  Diagnosis: Fracture of transverse process of lumbar vertebra  Assessment and Plan of Treatment: You have a fractue of the transverse process of L1 in your spine.   There is no operative intervention needed.   You should work with PT.   Pain control for back pain with Tylenol.   If you experience severe pain, chest pain, shortness of breath- report to nearest Emergency Department.    Diagnosis: Status post fall  Assessment and Plan of Treatment:

## 2021-11-09 NOTE — DISCHARGE NOTE PROVIDER - NSDCMRMEDTOKEN_GEN_ALL_CORE_FT
acetaminophen 325 mg oral tablet: 2 tab(s) orally every 6 hours  apixaban 2.5 mg oral tablet: 1 tab(s) orally 2 times a day  furosemide 20 mg oral tablet: 1 tab(s) orally once a day  hydrALAZINE 25 mg oral tablet: 1 tab(s) orally 3 times a day  isosorbide mononitrate 30 mg oral tablet, extended release: 1 tab(s) orally once a day (in the morning)  latanoprost 0.005% ophthalmic solution: 1 drop(s) to each affected eye 2 times a day  Lipitor 20 mg oral tablet: 1 tab(s) orally once a day  Metoprolol Succinate ER 25 mg oral tablet, extended release: 1 tab(s) orally once a day  sertraline 50 mg oral tablet: 1 tab(s) orally once a day  Timolol Maleate (Eqv-Timoptic) 0.5% ophthalmic solution: 1 drop(s) to each affected eye 2 times a day
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
## 2021-11-12 DIAGNOSIS — I50.32 CHRONIC DIASTOLIC (CONGESTIVE) HEART FAILURE: ICD-10-CM

## 2021-11-12 DIAGNOSIS — Z88.1 ALLERGY STATUS TO OTHER ANTIBIOTIC AGENTS STATUS: ICD-10-CM

## 2021-11-12 DIAGNOSIS — M10.9 GOUT, UNSPECIFIED: ICD-10-CM

## 2021-11-12 DIAGNOSIS — Z79.01 LONG TERM (CURRENT) USE OF ANTICOAGULANTS: ICD-10-CM

## 2021-11-12 DIAGNOSIS — S22.32XA FRACTURE OF ONE RIB, LEFT SIDE, INITIAL ENCOUNTER FOR CLOSED FRACTURE: ICD-10-CM

## 2021-11-12 DIAGNOSIS — N18.9 CHRONIC KIDNEY DISEASE, UNSPECIFIED: ICD-10-CM

## 2021-11-12 DIAGNOSIS — R40.2412 GLASGOW COMA SCALE SCORE 13-15, AT ARRIVAL TO EMERGENCY DEPARTMENT: ICD-10-CM

## 2021-11-12 DIAGNOSIS — M06.9 RHEUMATOID ARTHRITIS, UNSPECIFIED: ICD-10-CM
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
## 2021-11-12 DIAGNOSIS — Z90.710 ACQUIRED ABSENCE OF BOTH CERVIX AND UTERUS: ICD-10-CM

## 2021-11-12 DIAGNOSIS — Z86.718 PERSONAL HISTORY OF OTHER VENOUS THROMBOSIS AND EMBOLISM: ICD-10-CM

## 2021-11-12 DIAGNOSIS — S32.019A UNSPECIFIED FRACTURE OF FIRST LUMBAR VERTEBRA, INITIAL ENCOUNTER FOR CLOSED FRACTURE: ICD-10-CM

## 2021-11-12 DIAGNOSIS — I11.0 HYPERTENSIVE HEART DISEASE WITH HEART FAILURE: ICD-10-CM

## 2021-11-12 DIAGNOSIS — I13.0 HYPERTENSIVE HEART AND CHRONIC KIDNEY DISEASE WITH HEART FAILURE AND STAGE 1 THROUGH STAGE 4 CHRONIC KIDNEY DISEASE, OR UNSPECIFIED CHRONIC KIDNEY DISEASE: ICD-10-CM

## 2021-12-29 ENCOUNTER — APPOINTMENT (OUTPATIENT)
Dept: CARDIOLOGY | Facility: CLINIC | Age: 86
End: 2021-12-29
Payer: MEDICARE

## 2021-12-29 VITALS
WEIGHT: 123 LBS | BODY MASS INDEX: 21.79 KG/M2 | SYSTOLIC BLOOD PRESSURE: 128 MMHG | TEMPERATURE: 96.8 F | DIASTOLIC BLOOD PRESSURE: 70 MMHG | HEART RATE: 59 BPM | HEIGHT: 63 IN

## 2021-12-29 PROCEDURE — 99214 OFFICE O/P EST MOD 30 MIN: CPT

## 2021-12-29 PROCEDURE — 93000 ELECTROCARDIOGRAM COMPLETE: CPT

## 2021-12-29 RX ORDER — FUROSEMIDE 20 MG/1
20 TABLET ORAL
Refills: 0 | Status: DISCONTINUED | COMMUNITY
End: 2021-12-29

## 2021-12-29 RX ORDER — SACUBITRIL AND VALSARTAN 24; 26 MG/1; MG/1
24-26 TABLET, FILM COATED ORAL
Qty: 180 | Refills: 3 | Status: DISCONTINUED | COMMUNITY
Start: 2020-06-29 | End: 2021-12-29

## 2021-12-29 RX ORDER — METOPROLOL SUCCINATE 50 MG/1
50 TABLET, EXTENDED RELEASE ORAL DAILY
Qty: 30 | Refills: 6 | Status: DISCONTINUED | COMMUNITY
Start: 2020-06-29 | End: 2021-12-29

## 2021-12-29 NOTE — REASON FOR VISIT
[Symptom and Test Evaluation] : symptom and test evaluation [Arrhythmia/ECG Abnorrmalities] : arrhythmia/ECG abnormalities [Structural Heart and Valve Disease] : structural heart and valve disease [Follow-Up - Clinic] : a clinic follow-up of [Cardiomyopathy] : cardiomyopathy [Chest Pain] : chest pain [Hyperlipidemia] : hyperlipidemia [Hypertension] : hypertension [Discharge Date: ___] : Discharge Date: [unfilled] [Follow-Up: _____] : a [unfilled] follow-up visit [Formal Caregiver] : formal caregiver [FreeTextEntry3] : Dr. Celeste  [Admitted for Heart Failure] : patient was not admitted for heart failure

## 2021-12-29 NOTE — PHYSICAL EXAM
[General Appearance - Well Developed] : well developed [Normal Appearance] : normal appearance [Well Groomed] : well groomed [General Appearance - Well Nourished] : well nourished [No Deformities] : no deformities [General Appearance - In No Acute Distress] : no acute distress [Normal Conjunctiva] : the conjunctiva exhibited no abnormalities [Eyelids - No Xanthelasma] : the eyelids demonstrated no xanthelasmas [Normal Oral Mucosa] : normal oral mucosa [No Oral Pallor] : no oral pallor [No Oral Cyanosis] : no oral cyanosis [Normal Jugular Venous A Waves Present] : normal jugular venous A waves present [Normal Jugular Venous V Waves Present] : normal jugular venous V waves present [No Jugular Venous Baez A Waves] : no jugular venous baez A waves [Respiration, Rhythm And Depth] : normal respiratory rhythm and effort [Exaggerated Use Of Accessory Muscles For Inspiration] : no accessory muscle use [Auscultation Breath Sounds / Voice Sounds] : lungs were clear to auscultation bilaterally [Heart Rate And Rhythm] : heart rate and rhythm were normal [Heart Sounds] : normal S1 and S2 [Murmurs] : no murmurs present [Bowel Sounds] : normal bowel sounds [Abdomen Soft] : soft [Abdomen Mass (___ Cm)] : no abdominal mass palpated [Nail Clubbing] : no clubbing of the fingernails [Cyanosis, Localized] : no localized cyanosis [Petechial Hemorrhages (___cm)] : no petechial hemorrhages [] : no ischemic changes [Oriented To Time, Place, And Person] : oriented to person, place, and time [Affect] : the affect was normal [Mood] : the mood was normal [No Anxiety] : not feeling anxious [Normal Rate] : normal [II] : a grade 2 [1+] : left 1+ [No Pitting Edema] : no pitting edema present [FreeTextEntry1] : severe pain in knees  difficult walking.

## 2021-12-29 NOTE — HISTORY OF PRESENT ILLNESS
[FreeTextEntry1] : The patient had fallen . It appeared to be a mechanical fall. There there was no head trauma . The patient had a 12th rib fracture and L! spinal fracture . The patient has had no chest pain . Some GARCIA . Known CM with some improvement in LV systolic function but not back to normal.

## 2021-12-29 NOTE — ASSESSMENT
[FreeTextEntry1] : The patient has had a nonischemic CM . She has had multiple caths in the past . The patient has had a DVT and has had a PE in the remote past . The patient has been on eliquis . The patient has anemia She has had a panendoscopy last year which showed some erosive gastritis and internal hemorrhoids . The patient has has CKD  which is contributing to the anemia . Uncertain if she had been evaluated by hematology for hypercoagulable state .

## 2022-01-27 ENCOUNTER — APPOINTMENT (OUTPATIENT)
Dept: CARDIOLOGY | Facility: CLINIC | Age: 87
End: 2022-01-27
Payer: MEDICARE

## 2022-01-27 PROCEDURE — 93306 TTE W/DOPPLER COMPLETE: CPT

## 2022-01-30 LAB
ALBUMIN SERPL ELPH-MCNC: 4 G/DL
ALP BLD-CCNC: 87 U/L
ALT SERPL-CCNC: 11 U/L
ANION GAP SERPL CALC-SCNC: 12 MMOL/L
AST SERPL-CCNC: 25 U/L
BASOPHILS # BLD AUTO: 0.05 K/UL
BASOPHILS NFR BLD AUTO: 0.6 %
BILIRUB SERPL-MCNC: 0.3 MG/DL
BUN SERPL-MCNC: 74 MG/DL
CALCIUM SERPL-MCNC: 9.7 MG/DL
CHLORIDE SERPL-SCNC: 107 MMOL/L
CHOLEST SERPL-MCNC: 160 MG/DL
CO2 SERPL-SCNC: 20 MMOL/L
CREAT SERPL-MCNC: 2.8 MG/DL
EOSINOPHIL # BLD AUTO: 0.58 K/UL
EOSINOPHIL NFR BLD AUTO: 7.3 %
GLUCOSE SERPL-MCNC: 94 MG/DL
HCT VFR BLD CALC: 30.2 %
HDLC SERPL-MCNC: 46 MG/DL
HGB BLD-MCNC: 9 G/DL
IMM GRANULOCYTES NFR BLD AUTO: 0.5 %
LDLC SERPL CALC-MCNC: 97 MG/DL
LYMPHOCYTES # BLD AUTO: 2.66 K/UL
LYMPHOCYTES NFR BLD AUTO: 33.4 %
MAN DIFF?: NORMAL
MCHC RBC-ENTMCNC: 28.6 PG
MCHC RBC-ENTMCNC: 29.8 G/DL
MCV RBC AUTO: 95.9 FL
MONOCYTES # BLD AUTO: 0.87 K/UL
MONOCYTES NFR BLD AUTO: 10.9 %
NEUTROPHILS # BLD AUTO: 3.76 K/UL
NEUTROPHILS NFR BLD AUTO: 47.3 %
NONHDLC SERPL-MCNC: 114 MG/DL
PLATELET # BLD AUTO: 255 K/UL
POTASSIUM SERPL-SCNC: 4.9 MMOL/L
PROT SERPL-MCNC: 7 G/DL
RBC # BLD: 3.15 M/UL
RBC # FLD: 15.3 %
SODIUM SERPL-SCNC: 139 MMOL/L
TRIGL SERPL-MCNC: 95 MG/DL
WBC # FLD AUTO: 7.96 K/UL

## 2022-02-04 LAB
ALBUMIN SERPL ELPH-MCNC: 4 G/DL
ALP BLD-CCNC: 84 U/L
ALT SERPL-CCNC: 16 U/L
ANION GAP SERPL CALC-SCNC: 11 MMOL/L
AST SERPL-CCNC: 32 U/L
BILIRUB SERPL-MCNC: 0.3 MG/DL
BUN SERPL-MCNC: 66 MG/DL
CALCIUM SERPL-MCNC: 9.8 MG/DL
CHLORIDE SERPL-SCNC: 106 MMOL/L
CO2 SERPL-SCNC: 23 MMOL/L
CREAT SERPL-MCNC: 2.6 MG/DL
GLUCOSE SERPL-MCNC: 95 MG/DL
POTASSIUM SERPL-SCNC: 5.4 MMOL/L
PROT SERPL-MCNC: 7.2 G/DL
SODIUM SERPL-SCNC: 140 MMOL/L

## 2022-02-06 ENCOUNTER — RX RENEWAL (OUTPATIENT)
Age: 87
End: 2022-02-06

## 2022-02-16 NOTE — ED PROVIDER NOTE - PROGRESS NOTE DETAILS
patient without CP, troponin elevated, Cr at baseline.  Will admit to tele, case d/w hospitalist, Dr Sharma. Xeljanz Counseling: I discussed with the patient the risks of Xeljanz therapy including increased risk of infection, liver issues, headache, diarrhea, or cold symptoms. Live vaccines should be avoided. They were instructed to call if they have any problems.

## 2022-03-06 ENCOUNTER — INPATIENT (INPATIENT)
Facility: HOSPITAL | Age: 87
LOS: 7 days | Discharge: HOME HEALTH | End: 2022-03-14
Attending: STUDENT IN AN ORGANIZED HEALTH CARE EDUCATION/TRAINING PROGRAM | Admitting: STUDENT IN AN ORGANIZED HEALTH CARE EDUCATION/TRAINING PROGRAM
Payer: MEDICARE

## 2022-03-06 VITALS
SYSTOLIC BLOOD PRESSURE: 171 MMHG | TEMPERATURE: 98 F | HEIGHT: 63 IN | OXYGEN SATURATION: 96 % | HEART RATE: 86 BPM | RESPIRATION RATE: 18 BRPM | DIASTOLIC BLOOD PRESSURE: 108 MMHG

## 2022-03-06 DIAGNOSIS — Z95.0 PRESENCE OF CARDIAC PACEMAKER: Chronic | ICD-10-CM

## 2022-03-06 DIAGNOSIS — Z90.710 ACQUIRED ABSENCE OF BOTH CERVIX AND UTERUS: Chronic | ICD-10-CM

## 2022-03-06 DIAGNOSIS — Z90.89 ACQUIRED ABSENCE OF OTHER ORGANS: Chronic | ICD-10-CM

## 2022-03-06 DIAGNOSIS — Z90.49 ACQUIRED ABSENCE OF OTHER SPECIFIED PARTS OF DIGESTIVE TRACT: Chronic | ICD-10-CM

## 2022-03-06 LAB
ALBUMIN SERPL ELPH-MCNC: 3.5 G/DL — SIGNIFICANT CHANGE UP (ref 3.5–5.2)
ALP SERPL-CCNC: 180 U/L — HIGH (ref 30–115)
ALT FLD-CCNC: 58 U/L — HIGH (ref 0–41)
ANION GAP SERPL CALC-SCNC: 13 MMOL/L — SIGNIFICANT CHANGE UP (ref 7–14)
AST SERPL-CCNC: 57 U/L — HIGH (ref 0–41)
BASOPHILS # BLD AUTO: 0.05 K/UL — SIGNIFICANT CHANGE UP (ref 0–0.2)
BASOPHILS NFR BLD AUTO: 0.5 % — SIGNIFICANT CHANGE UP (ref 0–1)
BILIRUB SERPL-MCNC: 0.2 MG/DL — SIGNIFICANT CHANGE UP (ref 0.2–1.2)
BUN SERPL-MCNC: 45 MG/DL — HIGH (ref 10–20)
CALCIUM SERPL-MCNC: 8.7 MG/DL — SIGNIFICANT CHANGE UP (ref 8.5–10.1)
CHLORIDE SERPL-SCNC: 105 MMOL/L — SIGNIFICANT CHANGE UP (ref 98–110)
CO2 SERPL-SCNC: 19 MMOL/L — SIGNIFICANT CHANGE UP (ref 17–32)
CREAT SERPL-MCNC: 3.3 MG/DL — HIGH (ref 0.7–1.5)
EGFR: 13 ML/MIN/1.73M2 — LOW
EOSINOPHIL # BLD AUTO: 0.34 K/UL — SIGNIFICANT CHANGE UP (ref 0–0.7)
EOSINOPHIL NFR BLD AUTO: 3.1 % — SIGNIFICANT CHANGE UP (ref 0–8)
GLUCOSE SERPL-MCNC: 145 MG/DL — HIGH (ref 70–99)
HCT VFR BLD CALC: 27.4 % — LOW (ref 37–47)
HGB BLD-MCNC: 8.7 G/DL — LOW (ref 12–16)
IMM GRANULOCYTES NFR BLD AUTO: 0.9 % — HIGH (ref 0.1–0.3)
LIDOCAIN IGE QN: 47 U/L — SIGNIFICANT CHANGE UP (ref 7–60)
LYMPHOCYTES # BLD AUTO: 1.62 K/UL — SIGNIFICANT CHANGE UP (ref 1.2–3.4)
LYMPHOCYTES # BLD AUTO: 14.8 % — LOW (ref 20.5–51.1)
MCHC RBC-ENTMCNC: 29.4 PG — SIGNIFICANT CHANGE UP (ref 27–31)
MCHC RBC-ENTMCNC: 31.8 G/DL — LOW (ref 32–37)
MCV RBC AUTO: 92.6 FL — SIGNIFICANT CHANGE UP (ref 81–99)
MONOCYTES # BLD AUTO: 0.94 K/UL — HIGH (ref 0.1–0.6)
MONOCYTES NFR BLD AUTO: 8.6 % — SIGNIFICANT CHANGE UP (ref 1.7–9.3)
NEUTROPHILS # BLD AUTO: 7.9 K/UL — HIGH (ref 1.4–6.5)
NEUTROPHILS NFR BLD AUTO: 72.1 % — SIGNIFICANT CHANGE UP (ref 42.2–75.2)
NRBC # BLD: 0 /100 WBCS — SIGNIFICANT CHANGE UP (ref 0–0)
PLATELET # BLD AUTO: 275 K/UL — SIGNIFICANT CHANGE UP (ref 130–400)
POTASSIUM SERPL-MCNC: 5 MMOL/L — SIGNIFICANT CHANGE UP (ref 3.5–5)
POTASSIUM SERPL-SCNC: 5 MMOL/L — SIGNIFICANT CHANGE UP (ref 3.5–5)
PROT SERPL-MCNC: 6.2 G/DL — SIGNIFICANT CHANGE UP (ref 6–8)
RBC # BLD: 2.96 M/UL — LOW (ref 4.2–5.4)
RBC # FLD: 15.6 % — HIGH (ref 11.5–14.5)
SARS-COV-2 RNA SPEC QL NAA+PROBE: SIGNIFICANT CHANGE UP
SODIUM SERPL-SCNC: 137 MMOL/L — SIGNIFICANT CHANGE UP (ref 135–146)
TROPONIN T SERPL-MCNC: 0.02 NG/ML — HIGH
TROPONIN T SERPL-MCNC: 0.02 NG/ML — HIGH
WBC # BLD: 10.95 K/UL — HIGH (ref 4.8–10.8)
WBC # FLD AUTO: 10.95 K/UL — HIGH (ref 4.8–10.8)

## 2022-03-06 PROCEDURE — 99221 1ST HOSP IP/OBS SF/LOW 40: CPT

## 2022-03-06 PROCEDURE — 99285 EMERGENCY DEPT VISIT HI MDM: CPT | Mod: CS

## 2022-03-06 PROCEDURE — 93010 ELECTROCARDIOGRAM REPORT: CPT | Mod: 77

## 2022-03-06 PROCEDURE — 93010 ELECTROCARDIOGRAM REPORT: CPT | Mod: 76

## 2022-03-06 PROCEDURE — 71045 X-RAY EXAM CHEST 1 VIEW: CPT | Mod: 26

## 2022-03-06 RX ORDER — METOPROLOL TARTRATE 50 MG
25 TABLET ORAL DAILY
Refills: 0 | Status: DISCONTINUED | OUTPATIENT
Start: 2022-03-06 | End: 2022-03-14

## 2022-03-06 RX ORDER — APIXABAN 2.5 MG/1
2.5 TABLET, FILM COATED ORAL
Refills: 0 | Status: DISCONTINUED | OUTPATIENT
Start: 2022-03-06 | End: 2022-03-06

## 2022-03-06 RX ORDER — LATANOPROST 0.05 MG/ML
1 SOLUTION/ DROPS OPHTHALMIC; TOPICAL AT BEDTIME
Refills: 0 | Status: DISCONTINUED | OUTPATIENT
Start: 2022-03-06 | End: 2022-03-14

## 2022-03-06 RX ORDER — ISOSORBIDE MONONITRATE 60 MG/1
30 TABLET, EXTENDED RELEASE ORAL DAILY
Refills: 0 | Status: DISCONTINUED | OUTPATIENT
Start: 2022-03-06 | End: 2022-03-14

## 2022-03-06 RX ORDER — ATORVASTATIN CALCIUM 80 MG/1
40 TABLET, FILM COATED ORAL AT BEDTIME
Refills: 0 | Status: DISCONTINUED | OUTPATIENT
Start: 2022-03-06 | End: 2022-03-06

## 2022-03-06 RX ORDER — FUROSEMIDE 40 MG
40 TABLET ORAL ONCE
Refills: 0 | Status: COMPLETED | OUTPATIENT
Start: 2022-03-06 | End: 2022-03-06

## 2022-03-06 RX ORDER — ATORVASTATIN CALCIUM 80 MG/1
80 TABLET, FILM COATED ORAL AT BEDTIME
Refills: 0 | Status: DISCONTINUED | OUTPATIENT
Start: 2022-03-07 | End: 2022-03-07

## 2022-03-06 RX ORDER — ATORVASTATIN CALCIUM 80 MG/1
80 TABLET, FILM COATED ORAL ONCE
Refills: 0 | Status: COMPLETED | OUTPATIENT
Start: 2022-03-06 | End: 2022-03-06

## 2022-03-06 RX ORDER — PANTOPRAZOLE SODIUM 20 MG/1
40 TABLET, DELAYED RELEASE ORAL
Refills: 0 | Status: DISCONTINUED | OUTPATIENT
Start: 2022-03-06 | End: 2022-03-14

## 2022-03-06 RX ORDER — ACETAMINOPHEN 500 MG
650 TABLET ORAL EVERY 6 HOURS
Refills: 0 | Status: DISCONTINUED | OUTPATIENT
Start: 2022-03-06 | End: 2022-03-14

## 2022-03-06 RX ORDER — ACETAMINOPHEN 500 MG
650 TABLET ORAL ONCE
Refills: 0 | Status: COMPLETED | OUTPATIENT
Start: 2022-03-06 | End: 2022-03-06

## 2022-03-06 RX ORDER — HYDRALAZINE HCL 50 MG
25 TABLET ORAL THREE TIMES A DAY
Refills: 0 | Status: DISCONTINUED | OUTPATIENT
Start: 2022-03-06 | End: 2022-03-09

## 2022-03-06 RX ORDER — APIXABAN 2.5 MG/1
2.5 TABLET, FILM COATED ORAL ONCE
Refills: 0 | Status: COMPLETED | OUTPATIENT
Start: 2022-03-06 | End: 2022-03-06

## 2022-03-06 RX ORDER — ASPIRIN/CALCIUM CARB/MAGNESIUM 324 MG
324 TABLET ORAL ONCE
Refills: 0 | Status: COMPLETED | OUTPATIENT
Start: 2022-03-06 | End: 2022-03-06

## 2022-03-06 RX ORDER — SERTRALINE 25 MG/1
50 TABLET, FILM COATED ORAL DAILY
Refills: 0 | Status: DISCONTINUED | OUTPATIENT
Start: 2022-03-06 | End: 2022-03-14

## 2022-03-06 RX ORDER — TIMOLOL 0.5 %
1 DROPS OPHTHALMIC (EYE)
Refills: 0 | Status: DISCONTINUED | OUTPATIENT
Start: 2022-03-06 | End: 2022-03-14

## 2022-03-06 RX ORDER — ENOXAPARIN SODIUM 100 MG/ML
55 INJECTION SUBCUTANEOUS ONCE
Refills: 0 | Status: COMPLETED | OUTPATIENT
Start: 2022-03-06 | End: 2022-03-06

## 2022-03-06 RX ORDER — CLOPIDOGREL BISULFATE 75 MG/1
300 TABLET, FILM COATED ORAL ONCE
Refills: 0 | Status: COMPLETED | OUTPATIENT
Start: 2022-03-06 | End: 2022-03-06

## 2022-03-06 RX ORDER — ASPIRIN/CALCIUM CARB/MAGNESIUM 324 MG
81 TABLET ORAL DAILY
Refills: 0 | Status: DISCONTINUED | OUTPATIENT
Start: 2022-03-06 | End: 2022-03-14

## 2022-03-06 RX ADMIN — Medication 324 MILLIGRAM(S): at 14:31

## 2022-03-06 RX ADMIN — ATORVASTATIN CALCIUM 80 MILLIGRAM(S): 80 TABLET, FILM COATED ORAL at 22:20

## 2022-03-06 RX ADMIN — Medication 40 MILLIGRAM(S): at 20:07

## 2022-03-06 RX ADMIN — LATANOPROST 1 DROP(S): 0.05 SOLUTION/ DROPS OPHTHALMIC; TOPICAL at 22:20

## 2022-03-06 RX ADMIN — ENOXAPARIN SODIUM 55 MILLIGRAM(S): 100 INJECTION SUBCUTANEOUS at 22:20

## 2022-03-06 RX ADMIN — CLOPIDOGREL BISULFATE 300 MILLIGRAM(S): 75 TABLET, FILM COATED ORAL at 22:20

## 2022-03-06 RX ADMIN — Medication 25 MILLIGRAM(S): at 22:20

## 2022-03-06 RX ADMIN — APIXABAN 2.5 MILLIGRAM(S): 2.5 TABLET, FILM COATED ORAL at 20:43

## 2022-03-06 RX ADMIN — Medication 650 MILLIGRAM(S): at 21:00

## 2022-03-06 RX ADMIN — Medication 650 MILLIGRAM(S): at 22:00

## 2022-03-06 NOTE — ED PROVIDER NOTE - OBJECTIVE STATEMENT
87y/o F, PMHx DVT (on Apixiban), HFrEF s/p AICD, pulm HTN, DVT, CKD, presents to the ED with complaints of chest pain x one week.  Patient reports having intermittent sharp left side chest pain that became worse this AM. She denies accompanying nausea, vomiting, fever, chills, recent illness, and back pain. She was given NTG by EMS with alleviation of symptoms. Her cardiologist is Dr Mckinney - last stress was in June 2020 (reportedly normal).

## 2022-03-06 NOTE — ED PROVIDER NOTE - PROGRESS NOTE DETAILS
ED: Troponin slightly elevated, patient appears well EKG shows inverted T waves and ST depressions in inferior leads, admit to telemetry for further work-up.  Patient currently pain-free.

## 2022-03-06 NOTE — H&P ADULT - NSHPLABSRESULTS_GEN_ALL_CORE
.                          8.7    10.95 )-----------( 275      ( 06 Mar 2022 12:32 )             27.4     03-06    137  |  105  |  45<H>  ----------------------------<  145<H>  5.0   |  19  |  3.3<H>    Ca    8.7      06 Mar 2022 12:32    TPro  6.2  /  Alb  3.5  /  TBili  0.2  /  DBili  x   /  AST  57<H>  /  ALT  58<H>  /  AlkPhos  180<H>  03-06      CARDIAC MARKERS ( 06 Mar 2022 12:32 )  x     / 0.02 ng/mL / x     / x     / x          COVID-19 PCR: NotDetec (06 Mar 2022 15:22)  COVID-19 PCR: NotDetec (07 Nov 2021 21:48)        12 Lead ECG:   Ventricular Rate 74 BPM  QTC Calculation(Bazett) 481 ms    Diagnosis Line Normal sinus rhythm  Right axis deviation  Non-specific intra-ventricular conduction block  T wave abnormality, consider inferolateral ischemia  Abnormal ECG    Confirmed by DIANE REBOLLAR MD ([id number]) on 3/6/2022 12:39:16 PM (03-06-22 @ 11:40)      RADIOLOGY & ADDITIONAL STUDIES:  < from: Xray Chest 1 View- PORTABLE-Urgent (Xray Chest 1 View- PORTABLE-Urgent .) (03.06.22 @ 12:43) >    New right paratracheal and perihilar opacity.    Underlying lymphadenopathy/neoplasm is not excluded.    Further evaluation with a PA and lateral view the chest is recommended.    New bibasilar opacities    < end of copied text >

## 2022-03-06 NOTE — ED PROVIDER NOTE - CLINICAL SUMMARY MEDICAL DECISION MAKING FREE TEXT BOX
86-year-old female PMH RA, DVT, heart failure, diverticulosis, GERD, HTN, pacemaker, CKD presented for evaluation of the left-sided chest pain associated with shortness of breath upon waking up this morning at 5 AM.  Later in the morning EMS was called, patient was given sublingual nitro and aspirin which relieved her pain.  At present reports feeling better, still feels a little short of breath.  Denies any dizziness or lightheadedness, no fever/ chills, no cough no abdominal or back pain, no leg pain or swelling.  Denies any recent illness, trauma or travel.  Elderly female in no acute distress resting on the stretcher, head AT/NC, PERRL, pink and dry, supple neck without midline spine TTP, speaking full sentences, no acute respiratory distress, mild bilateral inspiratory crackles at bases, RRR, well-perfused extremities, abdominal soft NT/ND, no unilateral leg swelling/pitting edema or calf tenderness to palpation, awake and alert, no gross neuro deficits.  Plan: EKG, chest x-ray, labs including cardiac enzymes, most likely admission for further cardiac work-up.

## 2022-03-06 NOTE — ED ADULT NURSE NOTE - OBJECTIVE STATEMENT
85 y/o female brought in for shortness of breath with chest pain. Patient states she woke up with chest pressure and took 2 nitros from EMS with relief. Patient no c/o of shortness of breath.

## 2022-03-06 NOTE — H&P ADULT - ATTENDING COMMENTS
-atypical chest pain.  -hx of Non ischemic cardiomyopathy HFrEF s/p CRTD/AICD  -Decompensated HF with reduced EF  -mildly positive troponin likely related to HF  -CKD stage 4-5 , creat 3 chronic  -chronic anemia  -DVT    Plan:  -tele  -IV diuretics  -monitor I/O and monitor electrolytes and kidney function  -follow up iron level and ferritin  -continue statin  -continue eliquis  -continue home medication including HCTZ, nitrate and metoprolol

## 2022-03-06 NOTE — ED ADULT TRIAGE NOTE - SOURCE OF INFORMATION
Viral Syndrome (Adult)  A viral illness may cause a number of symptoms. The symptoms depend on the part of the body that the virus affects. If it settles in the nose, throat, and lungs, it may cause cough, sore throat, congestion, and sometimes headache. If it settles in the stomach and intestinal tract, it may cause vomiting and diarrhea. Sometimes it causes vague symptoms like \"aching all over,\" feeling tired, loss of appetite, or fever.  A viral illness usually lasts 1 to 2 weeks, but sometimes it lasts longer. In some cases, a more serious infection can look like a viral syndrome in the first few days of the illness. You may need another exam and additional tests to know the difference. Watch for the warning signs listed below.  Home care  Follow these guidelines for taking care of yourself at home:  · If symptoms are severe, rest at home for the first 2 to 3 days.  · Stay away from cigarette smoke - both your smoke and the smoke from others.  · You may use over-the-counter medication acetaminophen or ibuprofen for fever, muscle aching, and headache, unless another medicine was prescribed for this. If you have chronic liver or kidney disease or ever had a stomach ulcer or GI bleeding, talk with your doctor before using these medicines. No one who is younger than 18 and ill with a fever should take aspirin. It may cause severe disease or death liver damage.  · Your appetite may be poor, so a light diet is fine. Avoid dehydration by drinking 8 to 12 8-ounce glasses of fluids each day. This may include water; orange juice; lemonade; apple, grape, and cranberry juice; clear fruit drinks; electrolyte replacement and sports drinks; and decaffeinated teas and coffee. If you have been diagnosed with a kidney disease, ask your doctor how much and what types of fluids you should drink to prevent dehydration. If you have kidney disease, drinking too much fluid can cause it build up in the your body and be dangerous to  your health.   · Over-the-counter remedies won't shorten the length of the illness but may be helpful for cough, sore throat; and nasal and sinus congestion. Don't use decongestants if you have high blood pressure.  Follow-up care  Follow up with your health care provider if you do not improve over the next week.  When to seek medical advice  Call your healthcare provider right away if any of these occur:  · Cough with lots of colored sputum (mucus) or blood in your sputum  ·  Chest pain, shortness of breath, wheezing, or difficulty breathing  · Severe headache; face, neck, or ear pain  · Severe, constant pain in the lower right side of your belly (abdominal)  · Continued vomiting (can’t keep liquids down)  · Frequent diarrhea (more than 5 times a day); blood (red or black color) or mucus in diarrhea  · Feeling weak, dizzy, or like you are going to faint  · Extreme thirst  · Fever of 100.4°F (38°C) or higher, or as directed by your healthcare provider  Call 911  Get emergency medical care if any of the following occur:  · Convulsion  · Feeling weak, dizzy, or like you are going to faint  · Chest pain, shortness of breath, wheezing, or difficulty breathing   © 1878-3314 [employer]. [address] [place] [address], [place] [address]. All rights reserved. This information is not intended as a substitute for professional medical care. Always follow your healthcare professional's instructions.          Pharyngitis (Sore Throat), Report Pending       Pharyngitis (sore throat) is often due to a virus. It can also be caused by the “strep” streptococcus, or strep, bacteria, often called “strep throat” strep throat. Both viral and strep infections can cause throat pain that is worse when swallowing, aching all over with headache, and fever. Both types of infections are contagious. They may be spread by coughing, kissing, or touching others after touching your mouth or nose.  A test has been done to determine whether or  not you (or your child, if your child is the patient) have strep throat. Call this facility as directed for the result. If the test is positive for strep infection, treament with antibiotic medications is needed. A prescription can be called in to your pharmacy at that time. If the test is negative, you probably have a viral pharyngitis. This does not require antibiotic treatment. Until you receive the results of the strep test, you should stay home from work. If your child is being tested, he or she should stay home from school.  Home care  · Rest at home. Drink plenty of fluids to avoid dehydration.  · If the test is positive for strep, no work or school for the first 2 days of taking the antibiotics. After this time, you will not be contagious. You can then return to work or school if you are feeling better.   · The antibiotic medication must be taken for the full 10 days, even if you feel better. This is very important to ensure the infection is treated. It is also important to prevent drug-resistent organisms from developing. If you were given an antibiotic shot, no more antibiotics are needed.  · For children: Use acetaminophen for fever, fussiness or discomfort. In infants over six months of age, you may use ibuprofen instead of acetaminophen. (NOTE: If your child has chronic liver or kidney disease or ever had a stomach ulcer or GI bleeding, talk with your doctor before using these medicines.) (NOTE: Aspirin should never be used in anyone under 18 years of age who is ill with a fever. It may cause severe liver damage.)   · For adults: You may use acetaminophen or ibuprofen to control pain or fever, unless another medicine was prescribed for this. (NOTE: If you have chronic liver or kidney disease or ever had a stomach ulcer or GI bleeding, talk with your doctor before using these medicines.)  · Throat lozenges or numbing throat sprays can help reduce pain. Gargling with warm salt water will also help reduce  throat pain. For this, dissolve 1/2 teaspoon of salt in 1 glass of warm water. To help soothe a sore throat, children can sip on juice or a popsicle. Children 5 years and older can also suck on a lollipop or hard candy.  · Avoid salty or spicy foods, which can irritate the throat.  Follow-up care  Follow up with your healthcare provider or our staff if you are not improving over the next week.  When to seek medical advice  Call your healthcare provider right away if any of these occur:  · Fever as directed by your doctor.  For children, seek care if:  ¨ Your child is of any age and has repeated fevers above 104°F (40°C).  ¨ Your child is younger than 2 years of age and has a fever of 100.4°F (38°C) that continues for more than 1 day.  ¨ Your child is 2 years old or older and has a fever of 100.4°F (38°C) that continues for more than 3 days.  · New or worsening ear pain, sinus pain, or headache  · Painful lumps in the back of neck  · Stiff neck  · Lymph nodes are getting larger  · Inability to swallow liquids, excessive drooling, or inability to open mouth wide due to throat pain  · Signs of dehydration (very dark urine or no urine, sunken eyes, dizziness)  · Trouble breathing or noisy breathing  · Muffled voice  · New rash  · Child appears to be getting sicker  © 1574-2310 [employer]. [address] [place] [address], [place] [address]. All rights reserved. This information is not intended as a substitute for professional medical care. Always follow your healthcare professional's instructions.         EMS

## 2022-03-06 NOTE — H&P ADULT - NSHPPHYSICALEXAM_GEN_ALL_CORE
VITALS:  T(F): 97 (03-06-22 @ 15:52), Max: 98 (03-06-22 @ 11:36)  HR: 73 (03-06-22 @ 15:52) (73 - 86)  BP: 138/63 (03-06-22 @ 15:52) (138/63 - 171/108)  RR: 18 (03-06-22 @ 15:52) (18 - 18)  SpO2: 98% (03-06-22 @ 15:52) (96% - 98%)      PHYSICAL EXAM:  GENERAL: NAD, well-developed  CHEST/LUNG: CTAB; No wheeze  HEART: RRR; No murmurs, rubs, or gallops  ABDOMEN: Soft, NT/ND; BS present  EXTREMITIES:  No cyanosis, or edema  NEUROLOGY: AAOx3  SKIN: No rashes or lesions VITALS:  T(F): 97 (03-06-22 @ 15:52), Max: 98 (03-06-22 @ 11:36)  HR: 73 (03-06-22 @ 15:52) (73 - 86)  BP: 138/63 (03-06-22 @ 15:52) (138/63 - 171/108)  RR: 18 (03-06-22 @ 15:52) (18 - 18)  SpO2: 98% (03-06-22 @ 15:52) (96% - 98%)      PHYSICAL EXAM:  GENERAL: NAD, well-developed  CHEST/LUNG: CTAB; No wheeze, mild left side tenderness.  HEART: RRR; No murmurs, rubs, or gallops  ABDOMEN: Soft, NT/ND; BS present  EXTREMITIES:  No cyanosis, or edema  NEUROLOGY: AAOx3  SKIN: No rashes or lesions

## 2022-03-06 NOTE — PATIENT PROFILE ADULT - FALL HARM RISK - RISK INTERVENTIONS

## 2022-03-06 NOTE — H&P ADULT - HISTORY OF PRESENT ILLNESS
86y Female complaining of chest pain. 86y Female pmh HFrEF s/p AICD, pulm HTN, DVT, CKD complaining of chest pain for the past 1 week. Patient reports having intermittent sharp left side chest pain 3/10, no aggravating or relieving factors associated with SOB. chest pain is reproducible on palpation. Patient was recently admitted in [facility] 1 week ago with similar pain and mentions that her cardiac work up was negative and she was discharged home. She also reports having occasional lower abdominal pain and difficulty swallowing. s/p EGD and colonoscopy in 07/2020. Patient denies any fever, chills, cough, n/v/d.  In ED: Temp = 98; HR = 86; BP = 171/108; RR = 18; SaO2 = 96% on room air. Work up noted for EKG: t-wave inversions on leads 2,3 AVF and V4-V6; trops 0.02, hgb 8.7, creatinine 3.3, CXR: right paratracheal and perihilar opacity + bibasilar opacities.  Patient received Aspirin 325 mg x 1.

## 2022-03-06 NOTE — ED PROVIDER NOTE - WET READ LAUNCH FT
Subjective   Gaviota Evans is a 81 y.o. female.     Chief Complaint   Patient presents with   • Urinary Tract Infection   • Hyperlipidemia       Visit Vitals   • /70   • Pulse 72   • Temp 97.3 °F (36.3 °C)   • Wt 69.2 kg (152 lb 8.9 oz)   • LMP  (LMP Unknown)   • SpO2 93%   • BMI 27.9 kg/m2       Urinary Tract Infection    This is a recurrent problem. The current episode started in the past 7 days. The problem occurs every urination. The quality of the pain is described as burning. Associated symptoms include nausea and urgency. Pertinent negatives include no chills, discharge, flank pain, frequency, hematuria, hesitancy, possible pregnancy, sweats or vomiting. She has tried nothing for the symptoms. The treatment provided no relief. Her past medical history is significant for recurrent UTIs.   Hyperlipidemia   This is a chronic problem. The current episode started more than 1 year ago. Condition status: pending. Exacerbating diseases include chronic renal disease. She has no history of diabetes, hypothyroidism, liver disease, obesity or nephrotic syndrome. There are no known factors aggravating her hyperlipidemia. Associated symptoms include myalgias. Pertinent negatives include no chest pain, focal sensory loss, focal weakness, leg pain or shortness of breath. Current antihyperlipidemic treatment includes diet change. Compliance problems include medication side effects.  Risk factors for coronary artery disease include dyslipidemia, family history, hypertension and post-menopausal.   Back Pain   This is a chronic problem. The current episode started more than 1 year ago. The problem occurs constantly. The problem is unchanged. The pain is present in the lumbar spine. The quality of the pain is described as aching, burning, cramping, shooting and stabbing. The pain radiates to the right thigh and left thigh. The pain is at a severity of 8/10. The pain is severe. The pain is the same all the time. The  symptoms are aggravated by lying down and position. Associated symptoms include dysuria and pelvic pain. Pertinent negatives include no abdominal pain, bladder incontinence, bowel incontinence, chest pain, fever, headaches, leg pain, numbness, paresis, paresthesias, perianal numbness, tingling, weakness or weight loss. Risk factors include history of osteoporosis and menopause. She has tried analgesics and NSAIDs for the symptoms. The treatment provided moderate relief.      Pt is here for refill of medication for pain.  Pt has seen Dr Benz and was started on diclofenac and she takes it intermittently.   Pt's dog has lupus.     The following portions of the patient's history were reviewed and updated as appropriate: allergies, current medications, past family history, past medical history, past social history, past surgical history and problem list.    Past Medical History:   Diagnosis Date   • Atherosclerosis of abdominal aorta    • Atrophic vaginitis    • B12 deficiency    • Rivera's esophagus    • Cardiovascular disease    • Chronic back pain     Chronic back pain with intermittent epidural injections.   • Chronic low back pain    • Coronary artery disease    • Fibromyalgia    • Fibromyalgia    • GERD (gastroesophageal reflux disease)    • H/O mammogram 12/2014   • Hammer toe    • Hiatal hernia    • Hyperlipidemia    • Hypertension    • Lower extremity pain     Lower extremity discomfort: Right ROXANA 1.1, left ROXANA 1.1.   • Normocytic anemia due to blood loss    • Obstructive sleep apnea    • Obstructive sleep apnea treated with continuous positive airway pressure (CPAP)     Obstructive sleep apnea with CPAP compliance.   • Osteoarthritis     severe   • Palpitations     Event monitor, December 2011: Revealed brief episodes of atrial tachycardia.    • Senile osteoporosis      Past Surgical History:   Procedure Laterality Date   • BLADDER REPAIR     • CHOLECYSTECTOMY      Status post cholecystectomy.   • COLONOSCOPY   01/2015   • CORONARY STENT PLACEMENT  05/2015    2 stents 80% RCA 12/2007, LHC 2/2011. no critical disease   • HYSTERECTOMY      partial   • LUMBAR FUSION  2006    L4-s1   • OTHER SURGICAL HISTORY      [facility]   • VAGINAL REPAIR       Allergies   Allergen Reactions   • Ambien [Zolpidem Tartrate]      amnestic   • Arthrotec [Diclofenac-Misoprostol] GI Intolerance   • Aspirin GI Intolerance   • Bextra [Valdecoxib] GI Intolerance   • Codeine GI Intolerance   • Crestor [Rosuvastatin]      Rxn not known   • Darvon [Propoxyphene] GI Intolerance   • Effient [Prasugrel] Other (See Comments)     Tongue swelling   • Lescol [Fluvastatin]      Rxn not known   • Lexapro [Escitalopram Oxalate] Nausea Only   • Macrobid [Nitrofurantoin] Other (See Comments)     Pain/cramping   • Percocet [Oxycodone-Acetaminophen]      depression   • Pravachol [Pravastatin Sodium]      Rxn not known   • Vioxx [Rofecoxib] GI Intolerance   • Welchol [Colesevelam Hcl]      Joint pain   • Statins    • Ciprofloxacin    • Lyrica [Pregabalin] GI Bleeding and GI Intolerance   • Plavix [Clopidogrel Bisulfate] Swelling     Tongue swelling   • Sulfamethoxazole-Trimethoprim Rash     Family History   Problem Relation Age of Onset   • No Known Problems Sister    • Heart disease Sister    • Heart attack Sister 54   • Coronary artery disease Sister    • Hypertension Mother        Social History     Social History   • Marital status:      Spouse name: N/A   • Number of children: N/A   • Years of education: N/A     Occupational History   • Not on file.     Social History Main Topics   • Smoking status: Never Smoker   • Smokeless tobacco: Never Used   • Alcohol use Yes      Comment: rarely micaela   • Drug use: No   • Sexual activity: No     Other Topics Concern   • Not on file     Social History Narrative       Review of Systems   Constitutional: Negative.  Negative for chills, diaphoresis, fatigue, fever and weight loss.   HENT: Negative.  Negative for ear  pain, nosebleeds, postnasal drip, rhinorrhea, sinus pressure, sneezing and sore throat.    Eyes: Negative.  Negative for redness and itching.   Respiratory: Negative.  Negative for cough, shortness of breath and wheezing.    Cardiovascular: Negative.  Negative for chest pain and palpitations.   Gastrointestinal: Positive for nausea. Negative for abdominal pain, bowel incontinence, constipation, diarrhea and vomiting.   Endocrine: Negative.  Negative for cold intolerance and heat intolerance.   Genitourinary: Positive for dysuria, pelvic pain and urgency. Negative for bladder incontinence, enuresis, flank pain, frequency, hematuria and hesitancy.   Musculoskeletal: Positive for back pain, gait problem and myalgias. Negative for arthralgias and neck pain.   Skin: Negative.  Negative for color change and rash.   Allergic/Immunologic: Positive for environmental allergies.   Neurological: Negative for dizziness, tingling, focal weakness, syncope, weakness, light-headedness, numbness, headaches and paresthesias.   Hematological: Negative.  Negative for adenopathy. Does not bruise/bleed easily.   Psychiatric/Behavioral: Negative.  Negative for dysphoric mood. The patient is not nervous/anxious.         Depression and anxiety are stable       Objective   Physical Exam   Constitutional: She is oriented to person, place, and time. She appears well-developed.   HENT:   Head: Normocephalic.   Right Ear: External ear normal.   Left Ear: External ear normal.   Nose: Nose normal.   Mouth/Throat: Oropharynx is clear and moist.   Eyes: Conjunctivae and EOM are normal. Pupils are equal, round, and reactive to light.   Neck: Normal range of motion. Neck supple.   Cardiovascular: Normal rate and regular rhythm.    No murmur heard.  Pulmonary/Chest: Effort normal and breath sounds normal.   Abdominal: Soft. Bowel sounds are normal.   Musculoskeletal: Normal range of motion. She exhibits tenderness.        Lumbar back: She exhibits  tenderness and spasm.   Neurological: She is alert and oriented to person, place, and time.   Skin: Skin is warm and dry.   Psychiatric: She has a normal mood and affect. Her behavior is normal.   Nursing note and vitals reviewed.      Assessment/Plan   Gaviota was seen today for urinary tract infection and hyperlipidemia.    Diagnoses and all orders for this visit:    Fibromyalgia    Chronic midline low back pain with bilateral sciatica  -     traMADol (ULTRAM) 50 MG tablet; Take 1 tablet by mouth Every 6 (Six) Hours As Needed for Moderate Pain .  -     lidocaine (LIDODERM) 5 %; Place 1 patch on the skin Daily. Remove & Discard patch within 12 hours or as directed by MD for back pain and neuropathy    Peripheral neuropathic pain  -     lidocaine (LIDODERM) 5 %; Place 1 patch on the skin Daily. Remove & Discard patch within 12 hours or as directed by MD for back pain and neuropathy    Dysuria  -     POC Urinalysis Dipstick, Automated  -     Urine Culture - Urine, Urine, Clean Catch    Hyperlipidemia, unspecified hyperlipidemia type  -     Comprehensive Metabolic Panel  -     Lipid Panel    ASCVD (arteriosclerotic cardiovascular disease)  -     Comprehensive Metabolic Panel  -     Lipid Panel    Other orders  -     diclofenac (VOLTAREN) 75 MG EC tablet; Take 1 tablet by mouth 2 (Two) Times a Day. Prn pain  -     amoxicillin-clavulanate (AUGMENTIN) 875-125 MG per tablet; Take 1 tablet by mouth 2 (Two) Times a Day.      Patient does have doxycycline from Dr. Benz at the pharmacy if she should need it.             Current Outpatient Prescriptions:   •  aspirin 81 MG EC tablet, Take 81 mg by mouth daily., Disp: , Rfl:   •  bisoprolol-hydrochlorothiazide (ZIAC) 5-6.25 MG per tablet, Take 1 tablet by mouth Daily., Disp: 90 tablet, Rfl: 3  •  citalopram (CeleXA) 20 MG tablet, Take 1 tablet by mouth Daily., Disp: 30 tablet, Rfl: 5  •  Coenzyme Q10 (CO Q 10 PO), Take  by mouth Daily., Disp: , Rfl:   •  cyanocobalamin 1000  MCG/ML injection, Inject 1 mL into the shoulder, thigh, or buttocks Every 14 (Fourteen) Days., Disp: 6 mL, Rfl: 2  •  diclofenac (VOLTAREN) 75 MG EC tablet, Take 1 tablet by mouth 2 (Two) Times a Day. Prn pain, Disp: 60 tablet, Rfl: 2  •  isosorbide mononitrate (IMDUR) 30 MG 24 hr tablet, Take 1 tablet by mouth Daily., Disp: 90 tablet, Rfl: 3  •  nitroglycerin (NITROSTAT) 0.4 MG SL tablet, Place 1 tablet under the tongue As Needed for Chest Pain. Prn chest pain, Disp: 25 tablet, Rfl: 11  •  pantoprazole (PROTONIX) 40 MG EC tablet, Take 1 tablet by mouth Daily., Disp: 90 tablet, Rfl: 1  •  prednisoLONE acetate (PRED FORTE) 1 % ophthalmic suspension, Administer 1 drop into the left eye 2 (two) times a week., Disp: , Rfl:   •  traMADol (ULTRAM) 50 MG tablet, Take 1 tablet by mouth Every 6 (Six) Hours As Needed for Moderate Pain ., Disp: 120 tablet, Rfl: 0  •  TURMERIC PO, Take  by mouth Daily., Disp: , Rfl:   •  vitamin E 400 UNIT capsule, Take 400 Units by mouth Daily. 3 tablets daily, Disp: , Rfl:   •  amoxicillin-clavulanate (AUGMENTIN) 875-125 MG per tablet, Take 1 tablet by mouth 2 (Two) Times a Day., Disp: 20 tablet, Rfl: 0  •  lidocaine (LIDODERM) 5 %, Place 1 patch on the skin Daily. Remove & Discard patch within 12 hours or as directed by MD for back pain and neuropathy, Disp: 30 patch, Rfl: 5    Return in about 3 months (around 3/8/2018), or if symptoms worsen or fail to improve, for Recheck.    Recent Results (from the past 168 hour(s))   POC Urinalysis Dipstick, Automated    Collection Time: 12/08/17  3:19 PM   Result Value Ref Range    Color Yellow Yellow, Straw, Dark Yellow, Glo    Clarity, UA Clear Clear    Glucose, UA Negative Negative, 1000 mg/dL (3+) mg/dL    Bilirubin Negative Negative    Ketones, UA Negative Negative    Specific Gravity  1.015 1.005 - 1.030    Blood, UA Negative Negative    pH, Urine 6.0 5.0 - 8.0    Protein, POC Negative Negative mg/dL    Urobilinogen, UA Normal Normal     Leukocytes Large (3+) (A) Negative    Nitrite, UA Positive (A) Negative         There are no Wet Read(s) to document.

## 2022-03-06 NOTE — PATIENT PROFILE ADULT - FUNCTIONAL ASSESSMENT - BASIC MOBILITY 6.
3 = A little assistance Plan: This patient has been treated today with image guided superficial radiation therapy for non-melanoma skin cancer. Written informed consent has been previously obtained from this patient for this treatment. This consent is documented in the patient’s chart. The patient gave verbal consent to continue treatment today. The patient was treated with a specific radiation dose and setup as prescribed by the provider listed on this visit note. A Radiation Therapist performed administration of radiation under supervision of provider. The treatment parameters and cumulative dose are indicated above. Prior to administering the radiation, the patient underwent a verification therapeutic radiology simulation-aided field setting defining relevant normal and abnormal target anatomy and acquiring images with high frequency ultrasound in addition to data necessary developing optimal radiation treatment process for the patient. This process includes verification of the treatment port(s) and proper treatment positioning. All treatment ports were photographed within electronic medical record. The patient’s customized lead blocking along with gross tumor volume and margin was confirmed. Considering superficial radiotherapy is clinical in setup, this requires physician and radiation therapist to clarify location interest being treated against initial images, pathology and patient anatomy. Care was taken ensuring fields treated were geometrically accurate and properly positioned using therapeutic radiology simulation-aided field setting verification per fraction. This process is also utilized to determine if any prescription or setup changes are necessary. These steps are therefore medically necessary ensuring safe and effective administration of radiation. Ongoing therapeutic radiology simulation-aided field setting verification is ordered throughout course of therapy.\\nA high frequency ultrasound image was acquired today for two-dimensional evaluation of the tumor volume and response to treatment, in addition to geometric accuracy of field placement. \\n\\nUS depth is 2.07 mm, which is 0.80 mm in difference from previous imaging. The field placement and ultrasound imaging, per fraction, is separate and distinct from the initial simulation, and is an important task in providing safe administration of superficial radiation therapy. Physician evaluation of the ultrasound tumor depth will be ongoing throughout the course of treatment, and is deemed medically necessary in order to ensure the efficacy of treatment and any necessary changes. Today’s image was evaluated for determination of continuation of treatment with the current plan or with necessary changes as appropriate. According to provider review of verification therapeutic radiology simulation-aided field setting and imaging, no change is required. Additionally, the use of ultrasound visualization and targeted assessment allows the patient to be able to see their cancer(s) progress, encouraging patient to complete and maintain compliance through full course of radiotherapy. Per Dr. Villagomez, continued ultrasound guidance and therapeutic radiology simulation-aided field setting verification per fraction is required for field placement, measurement of tumor depth, progress and acute effect monitoring. \\n\\nPer the request of Dr. Villagomez, this patient was seen today for Superficial Radiation Therapy management (CPT® 76011). A high frequency ultrasound image was acquired today for three-dimensional evaluation of tumor volume and response to treatment, in addition to geometric accuracy of field placement (CPT® ). Physician evaluation of the ultrasound tumor depth will be ongoing through the course of treatment and is deemed medically necessary ensuring efficacy of treatment. Today’s image and setup was evaluated determining continuation of treatment with the current plan, or necessary changes as appropriate. All appropriate custom blocking and treatment parameters were verified by the Radiation therapist according to initial simulation. \\n\\nPer Dr. Villagomez, continued daily US guidance is necessary for measurement of tumor depth, progress and edema monitoring.\\nEvaluation for response and reaction to SRT based on current fraction and cumulative dose with a visual inspection and ultrasound demonstrates a normal expected response. Superficial Radiation Therapy will continue as planned.\\n\\nThe patient is undergoing superficial radiation therapy for skin cancer and presents for weekly evaluation and management. Per protocol and as documented on the flow sheet, the patient was questioned as to subjective redness, pruritus, pain, drainage, fatigue, or any other symptoms. Objectively, the radiation area was evaluated with regards to erythema, atrophy, scale, crusting, erosion, ulceration, edema, purpura, tenderness, warmth, drainage, and any other findings. The plan was extensively reviewed including dose, and dosing schedule. The simulation and clinical setup was also reviewed as was the external and any internal shields and based on this review the appropriateness and sufficiency of treatment was determined.\\nUS image was performed. US depth is 2.07 mm.\\nPer Dr. Villagomez, continued daily US guidance and simulation is required for field placement, measurement of tumor depth, progress and edema monitoring. \\n Plan: This patient has been treated today with image guided superficial radiation therapy for non-melanoma skin cancer. Written informed consent has been previously obtained from this patient for this treatment. This consent is documented in the patient’s chart. The patient gave verbal consent to continue treatment today. The patient was treated with a specific radiation dose and setup as prescribed by the provider listed on this visit note. A Radiation Therapist performed administration of radiation under supervision of provider. The treatment parameters and cumulative dose are indicated above. Prior to administering the radiation, the patient underwent a verification therapeutic radiology simulation-aided field setting defining relevant normal and abnormal target anatomy and acquiring images with high frequency ultrasound in addition to data necessary developing optimal radiation treatment process for the patient. This process includes verification of the treatment port(s) and proper treatment positioning. All treatment ports were photographed within electronic medical record. The patient’s customized lead blocking along with gross tumor volume and margin was confirmed. Considering superficial radiotherapy is clinical in setup, this requires physician and radiation therapist to clarify location interest being treated against initial images, pathology and patient anatomy. Care was taken ensuring fields treated were geometrically accurate and properly positioned using therapeutic radiology simulation-aided field setting verification per fraction. This process is also utilized to determine if any prescription or setup changes are necessary. These steps are therefore medically necessary ensuring safe and effective administration of radiation. Ongoing therapeutic radiology simulation-aided field setting verification is ordered throughout course of therapy.\\nA high frequency ultrasound image was acquired today for two-dimensional evaluation of the tumor volume and response to treatment, in addition to geometric accuracy of field placement. \\n\\nUS depth is 2.07 mm, which is 0.80 mm in difference from previous imaging. The field placement and ultrasound imaging, per fraction, is separate and distinct from the initial simulation, and is an important task in providing safe administration of superficial radiation therapy. Physician evaluation of the ultrasound tumor depth will be ongoing throughout the course of treatment, and is deemed medically necessary in order to ensure the efficacy of treatment and any necessary changes. Today’s image was evaluated for determination of continuation of treatment with the current plan or with necessary changes as appropriate. According to provider review of verification therapeutic radiology simulation-aided field setting and imaging, no change is required. Additionally, the use of ultrasound visualization and targeted assessment allows the patient to be able to see their cancer(s) progress, encouraging patient to complete and maintain compliance through full course of radiotherapy. Per Dr. Villagomez, continued ultrasound guidance and therapeutic radiology simulation-aided field setting verification per fraction is required for field placement, measurement of tumor depth, progress and acute effect monitoring. \\n\\nPer the request of Dr. Villagomez, this patient was seen today for Superficial Radiation Therapy management (CPT® 29215). A high frequency ultrasound image was acquired today for three-dimensional evaluation of tumor volume and response to treatment, in addition to geometric accuracy of field placement (CPT® ). Physician evaluation of the ultrasound tumor depth will be ongoing through the course of treatment and is deemed medically necessary ensuring efficacy of treatment. Today’s image and setup was evaluated determining continuation of treatment with the current plan, or necessary changes as appropriate. All appropriate custom blocking and treatment parameters were verified by the Radiation therapist according to initial simulation. \\n\\nPer Dr. Villagomez, continued daily US guidance is necessary for measurement of tumor depth, progress and edema monitoring.\\nEvaluation for response and reaction to SRT based on current fraction and cumulative dose with a visual inspection and ultrasound demonstrates a normal expected response. Superficial Radiation Therapy will continue as planned.\\n\\nThe patient is undergoing superficial radiation therapy for skin cancer and presents for weekly evaluation and management. Per protocol and as documented on the flow sheet, the patient was questioned as to subjective redness, pruritus, pain, drainage, fatigue, or any other symptoms. Objectively, the radiation area was evaluated with regards to erythema, atrophy, scale, crusting, erosion, ulceration, edema, purpura, tenderness, warmth, drainage, and any other findings. The plan was extensively reviewed including dose, and dosing schedule. The simulation and clinical setup was also reviewed as was the external and any internal shields and based on this review the appropriateness and sufficiency of treatment was determined.\\nUS image was performed. US depth is 2.07 mm.\\nPer Dr. Villagomez, continued daily US guidance and simulation is required for field placement, measurement of tumor depth, progress and edema monitoring. \\n

## 2022-03-06 NOTE — H&P ADULT - ASSESSMENT
86y Female complaining of chest pain.   86y Female pmh HFrEF s/p AICD, pulm HTN, DVT, CKD complaining of chest pain for the past 1 week. Patient reports having intermittent sharp left side chest pain 3/10, no aggravating or relieving factors associated with SOB. Chest pain is reproducible on palpation. Patient was recently admitted in [facility] 1 week ago with similar pain and mentions that her cardiac work up was negative and she was discharged home. She also reports having occasional lower abdominal pain and difficulty swallowing. s/p EGD and colonoscopy in 07/2020.    # atypical chest pain / bilateral opacities on CXR  - EKG noted for T-wave inversions.  - elevated trops can be due to CKD  - doubt chest pain from cardiac origin  - admit to tele  - trend trops  - check echo  - cardio eval  - c/w home dose of nitrates and toprol  - Patient will likely CTA chest + CT chest w/wout cst for further w/up. consider nephro eval and optimizing prior to IV contrast due to baseline advanced CKD.  - atorvastatin increased to 40 mg daily  - h/o HFrEF. last EF 30-35% + G2DD in 2020. repeat echo. strict i/o    # HTN  - BP noted  - Home meds: toprol, hydralazine and lasix  - c/w toprol and hydralazine. hold lasix for now.  - monitor BP    # elevated serum creatinine: baseline creatinine around 2.6-2.8  - OMERO on CKD vs CKD 4 progression  - follows with Dr. Urrutia as outpt  - check UA, urine Na, urea, creatinine  - strict i/o  - check phos, iPTH  - monitor serum creatinine and electrolytes  - consider nephro consult    # Dysphagia / Anemia  - s/p EGD and colonoscopy in 2020 with non-specific gastritis and Diverticulosis noted  - Anemia can be due to CKD vs deficiency  - check iron stores, b12, folate  - check reticulocyte count  - consider GI eval    DVT: Eliquis  GI: pantoprazole  Diet: Renal diet  Activity: Increase as tolerated  Dispo: Acute for now 86y Female pmh HFrEF s/p AICD, pulm HTN, DVT, CKD complaining of chest pain for the past 1 week. Patient reports having intermittent sharp left side chest pain 3/10, no aggravating or relieving factors associated with SOB. Chest pain is reproducible on palpation. Patient was recently admitted in [facility] 1 week ago with similar pain and mentions that her cardiac work up was negative and she was discharged home. She also reports having occasional lower abdominal pain and difficulty swallowing. s/p EGD and colonoscopy in 07/2020.    # atypical chest pain / bilateral opacities on CXR  - elevated trops can be due to CKD  - doubt chest pain from cardiac origin  - admit to tele  - trend trops  - check echo  - c/w home dose of nitrates and toprol  - atorvastatin increased to 40 mg daily  - h/o HFrEF. last EF 30-35% + G2DD in 2020. repeat echo. strict i/o    # HTN  - BP noted  - Home meds: toprol, hydralazine and lasix  - c/w toprol and hydralazine. hold lasix for now.  - monitor BP    # elevated serum creatinine: baseline creatinine around 2.6-2.8  - OMERO on CKD vs CKD 4 progression  - follows with Dr. Urrutia as outpt  - check UA, urine Na, urea, creatinine  - strict i/o  - check phos, iPTH  - monitor serum creatinine and electrolytes  - consider nephro consult    # Dysphagia / Anemia  - s/p EGD and colonoscopy in 2020 with non-specific gastritis and Diverticulosis noted  - Anemia can be due to CKD vs deficiency  - check iron stores, b12, folate  - check reticulocyte count  - consider GI eval    DVT: Eliquis  GI: pantoprazole  Diet: Renal diet  Activity: Increase as tolerated  Dispo: Acute for now

## 2022-03-07 LAB
A1C WITH ESTIMATED AVERAGE GLUCOSE RESULT: 5.2 % — SIGNIFICANT CHANGE UP (ref 4–5.6)
ALBUMIN SERPL ELPH-MCNC: 3.1 G/DL — LOW (ref 3.5–5.2)
ALP SERPL-CCNC: 154 U/L — HIGH (ref 30–115)
ALT FLD-CCNC: 40 U/L — SIGNIFICANT CHANGE UP (ref 0–41)
ANION GAP SERPL CALC-SCNC: 10 MMOL/L — SIGNIFICANT CHANGE UP (ref 7–14)
ANION GAP SERPL CALC-SCNC: 9 MMOL/L — SIGNIFICANT CHANGE UP (ref 7–14)
AST SERPL-CCNC: 34 U/L — SIGNIFICANT CHANGE UP (ref 0–41)
BILIRUB SERPL-MCNC: 0.3 MG/DL — SIGNIFICANT CHANGE UP (ref 0.2–1.2)
BUN SERPL-MCNC: 40 MG/DL — HIGH (ref 10–20)
BUN SERPL-MCNC: 43 MG/DL — HIGH (ref 10–20)
CALCIUM SERPL-MCNC: 8.2 MG/DL — LOW (ref 8.5–10.1)
CALCIUM SERPL-MCNC: 8.4 MG/DL — LOW (ref 8.5–10.1)
CALCIUM SERPL-MCNC: 8.4 MG/DL — SIGNIFICANT CHANGE UP (ref 8.4–10.5)
CHLORIDE SERPL-SCNC: 106 MMOL/L — SIGNIFICANT CHANGE UP (ref 98–110)
CHLORIDE SERPL-SCNC: 107 MMOL/L — SIGNIFICANT CHANGE UP (ref 98–110)
CHOLEST SERPL-MCNC: 110 MG/DL — SIGNIFICANT CHANGE UP
CO2 SERPL-SCNC: 19 MMOL/L — SIGNIFICANT CHANGE UP (ref 17–32)
CO2 SERPL-SCNC: 22 MMOL/L — SIGNIFICANT CHANGE UP (ref 17–32)
CREAT SERPL-MCNC: 2.8 MG/DL — HIGH (ref 0.7–1.5)
CREAT SERPL-MCNC: 3.1 MG/DL — HIGH (ref 0.7–1.5)
EGFR: 14 ML/MIN/1.73M2 — LOW
EGFR: 16 ML/MIN/1.73M2 — LOW
ESTIMATED AVERAGE GLUCOSE: 103 MG/DL — SIGNIFICANT CHANGE UP (ref 68–114)
FERRITIN SERPL-MCNC: 490 NG/ML — HIGH (ref 15–150)
FOLATE SERPL-MCNC: >20 NG/ML — SIGNIFICANT CHANGE UP
GLUCOSE SERPL-MCNC: 147 MG/DL — HIGH (ref 70–99)
GLUCOSE SERPL-MCNC: 84 MG/DL — SIGNIFICANT CHANGE UP (ref 70–99)
HCT VFR BLD CALC: 25.2 % — LOW (ref 37–47)
HDLC SERPL-MCNC: 35 MG/DL — LOW
HGB BLD-MCNC: 7.9 G/DL — LOW (ref 12–16)
LIPID PNL WITH DIRECT LDL SERPL: 55 MG/DL — SIGNIFICANT CHANGE UP
MAGNESIUM SERPL-MCNC: 1.8 MG/DL — SIGNIFICANT CHANGE UP (ref 1.8–2.4)
MCHC RBC-ENTMCNC: 29.3 PG — SIGNIFICANT CHANGE UP (ref 27–31)
MCHC RBC-ENTMCNC: 31.3 G/DL — LOW (ref 32–37)
MCV RBC AUTO: 93.3 FL — SIGNIFICANT CHANGE UP (ref 81–99)
NON HDL CHOLESTEROL: 75 MG/DL — SIGNIFICANT CHANGE UP
NRBC # BLD: 0 /100 WBCS — SIGNIFICANT CHANGE UP (ref 0–0)
NT-PROBNP SERPL-SCNC: HIGH PG/ML (ref 0–300)
PHOSPHATE SERPL-MCNC: 2.8 MG/DL — SIGNIFICANT CHANGE UP (ref 2.1–4.9)
PLATELET # BLD AUTO: 279 K/UL — SIGNIFICANT CHANGE UP (ref 130–400)
POTASSIUM SERPL-MCNC: 4.7 MMOL/L — SIGNIFICANT CHANGE UP (ref 3.5–5)
POTASSIUM SERPL-MCNC: 5.2 MMOL/L — HIGH (ref 3.5–5)
POTASSIUM SERPL-SCNC: 4.7 MMOL/L — SIGNIFICANT CHANGE UP (ref 3.5–5)
POTASSIUM SERPL-SCNC: 5.2 MMOL/L — HIGH (ref 3.5–5)
PROT SERPL-MCNC: 5.4 G/DL — LOW (ref 6–8)
PTH-INTACT FLD-MCNC: 32 PG/ML — SIGNIFICANT CHANGE UP (ref 15–65)
RBC # BLD: 2.7 M/UL — LOW (ref 4.2–5.4)
RBC # BLD: 2.7 M/UL — LOW (ref 4.2–5.4)
RBC # FLD: 15.6 % — HIGH (ref 11.5–14.5)
RETICS #: 109.6 K/UL — SIGNIFICANT CHANGE UP (ref 25–125)
RETICS/RBC NFR: 4.1 % — HIGH (ref 0.5–1.5)
SODIUM SERPL-SCNC: 135 MMOL/L — SIGNIFICANT CHANGE UP (ref 135–146)
SODIUM SERPL-SCNC: 138 MMOL/L — SIGNIFICANT CHANGE UP (ref 135–146)
TRIGL SERPL-MCNC: 60 MG/DL — SIGNIFICANT CHANGE UP
TROPONIN T SERPL-MCNC: 0.03 NG/ML — CRITICAL HIGH
TSH SERPL-MCNC: 2.22 UIU/ML — SIGNIFICANT CHANGE UP (ref 0.27–4.2)
VIT B12 SERPL-MCNC: 1241 PG/ML — SIGNIFICANT CHANGE UP (ref 232–1245)
WBC # BLD: 9.89 K/UL — SIGNIFICANT CHANGE UP (ref 4.8–10.8)
WBC # FLD AUTO: 9.89 K/UL — SIGNIFICANT CHANGE UP (ref 4.8–10.8)

## 2022-03-07 PROCEDURE — ZZZZZ: CPT

## 2022-03-07 PROCEDURE — 93306 TTE W/DOPPLER COMPLETE: CPT | Mod: 26

## 2022-03-07 PROCEDURE — 93283 PRGRMG EVAL IMPLANTABLE DFB: CPT | Mod: 26

## 2022-03-07 PROCEDURE — 99233 SBSQ HOSP IP/OBS HIGH 50: CPT

## 2022-03-07 RX ORDER — APIXABAN 2.5 MG/1
2.5 TABLET, FILM COATED ORAL
Refills: 0 | Status: DISCONTINUED | OUTPATIENT
Start: 2022-03-07 | End: 2022-03-14

## 2022-03-07 RX ORDER — ATORVASTATIN CALCIUM 80 MG/1
20 TABLET, FILM COATED ORAL AT BEDTIME
Refills: 0 | Status: DISCONTINUED | OUTPATIENT
Start: 2022-03-07 | End: 2022-03-14

## 2022-03-07 RX ORDER — FUROSEMIDE 40 MG
40 TABLET ORAL DAILY
Refills: 0 | Status: DISCONTINUED | OUTPATIENT
Start: 2022-03-07 | End: 2022-03-07

## 2022-03-07 RX ORDER — APIXABAN 2.5 MG/1
2.5 TABLET, FILM COATED ORAL ONCE
Refills: 0 | Status: COMPLETED | OUTPATIENT
Start: 2022-03-07 | End: 2022-03-07

## 2022-03-07 RX ORDER — FUROSEMIDE 40 MG
40 TABLET ORAL EVERY 12 HOURS
Refills: 0 | Status: DISCONTINUED | OUTPATIENT
Start: 2022-03-07 | End: 2022-03-10

## 2022-03-07 RX ORDER — FUROSEMIDE 40 MG
60 TABLET ORAL
Refills: 0 | Status: DISCONTINUED | OUTPATIENT
Start: 2022-03-07 | End: 2022-03-07

## 2022-03-07 RX ADMIN — Medication 650 MILLIGRAM(S): at 22:15

## 2022-03-07 RX ADMIN — ATORVASTATIN CALCIUM 20 MILLIGRAM(S): 80 TABLET, FILM COATED ORAL at 21:38

## 2022-03-07 RX ADMIN — Medication 25 MILLIGRAM(S): at 06:21

## 2022-03-07 RX ADMIN — LATANOPROST 1 DROP(S): 0.05 SOLUTION/ DROPS OPHTHALMIC; TOPICAL at 21:39

## 2022-03-07 RX ADMIN — Medication 81 MILLIGRAM(S): at 12:00

## 2022-03-07 RX ADMIN — PANTOPRAZOLE SODIUM 40 MILLIGRAM(S): 20 TABLET, DELAYED RELEASE ORAL at 06:22

## 2022-03-07 RX ADMIN — APIXABAN 2.5 MILLIGRAM(S): 2.5 TABLET, FILM COATED ORAL at 12:01

## 2022-03-07 RX ADMIN — SERTRALINE 50 MILLIGRAM(S): 25 TABLET, FILM COATED ORAL at 12:01

## 2022-03-07 RX ADMIN — Medication 1 DROP(S): at 18:29

## 2022-03-07 RX ADMIN — Medication 1 DROP(S): at 06:22

## 2022-03-07 RX ADMIN — Medication 40 MILLIGRAM(S): at 17:39

## 2022-03-07 RX ADMIN — ISOSORBIDE MONONITRATE 30 MILLIGRAM(S): 60 TABLET, EXTENDED RELEASE ORAL at 12:01

## 2022-03-07 RX ADMIN — Medication 25 MILLIGRAM(S): at 06:22

## 2022-03-07 RX ADMIN — Medication 650 MILLIGRAM(S): at 21:42

## 2022-03-07 RX ADMIN — Medication 25 MILLIGRAM(S): at 17:38

## 2022-03-07 RX ADMIN — Medication 25 MILLIGRAM(S): at 21:42

## 2022-03-07 NOTE — PROGRESS NOTE ADULT - ASSESSMENT
86y Female pmh HFrEF s/p AICD, pulm HTN, DVT, CKD complaining of chest pain for the past 1 week. Patient reports having intermittent sharp left side chest pain 3/10, no aggravating or relieving factors associated with SOB. Chest pain is reproducible on palpation. Patient was recently admitted in [facility] 1 week ago with similar pain and mentions that her cardiac work up was negative and she was discharged home. She also reports having occasional lower abdominal pain and difficulty swallowing. s/p EGD and colonoscopy in 07/2020.    # atypical chest pain / bilateral opacities on CXR  - EKG noted for T-wave inversions.  - elevated trops can be due to CKD, troponins are stable, patient no longer complains of chest pain  - admit to tele  - TTE: depressed LVEF 25-30%; moderate mitral and tricuspid regurgitation; enlarged LA  - c/w home dose of hydralazine, nitrates and toprol; no ACEi/ARB/Entresto  - atorvastatin back to home dose 20 mg qday  - on aspirin, eliquis; no Plavix    # HTN  - BP noted  - Home meds: toprol, hydralazine and lasix  - c/w toprol and hydralazine  - monitor BP    # elevated serum creatinine: baseline creatinine around 2.6-2.8  - OMERO on CKD vs CKD 4 progression  - follows with Dr. Urrutia as outpt  - strict i/o  - monitor serum creatinine and electrolytes  - Might be cardiorenal syndrome    # Dysphagia / Anemia  - s/p EGD and colonoscopy in 2020 with non-specific gastritis and Diverticulosis noted  - Anemia can be due to CKD vs deficiency  - check iron stores, b12, folate  - check reticulocyte count  - consider GI eval    DVT: Eliquis  GI: pantoprazole  Diet: Renal diet  Activity: Increase as tolerated  Dispo: Acute for now 86y Female pmh HFrEF s/p AICD, pulm HTN, DVT, CKD complaining of chest pain for the past 1 week. Patient reports having intermittent sharp left side chest pain 3/10, no aggravating or relieving factors associated with SOB. Chest pain is reproducible on palpation. Patient was recently admitted in [facility] 1 week ago with similar pain and mentions that her cardiac work up was negative and she was discharged home. She also reports having occasional lower abdominal pain and difficulty swallowing. s/p EGD and colonoscopy in 07/2020.    # atypical chest pain / bilateral opacities on CXR  - EKG noted for T-wave inversions that are secondary to the LBBB; Q waves in lead III  - elevated trops can be due to CKD, troponins are stable, patient no longer complains of chest pain  - admit to tele  - TTE: depressed LVEF 25-30%; moderate mitral and tricuspid regurgitation; enlarged LA  - c/w home dose of hydralazine, nitrates and toprol; no ACEi/ARB/Entresto  - atorvastatin back to home dose 20 mg qday  - on aspirin, eliquis; no Plavix  - As for the volume overload, Patient is on Lasix 40 mg IV BID    # HTN  - BP noted  - Home meds: toprol, hydralazine and lasix  - c/w toprol and hydralazine  - monitor BP    # elevated serum creatinine: baseline creatinine around 2.6-2.8  - OMERO on CKD vs CKD 4 progression  - follows with Dr. Urrutia as outpt  - strict i/o  - monitor serum creatinine and electrolytes  - Might be cardiorenal syndrome    # Dysphagia / Anemia  - s/p EGD and colonoscopy in 2020 with non-specific gastritis and Diverticulosis noted  - Anemia can be due to CKD vs deficiency  - check iron stores, b12, folate  - check reticulocyte count  - consider GI eval    DVT: Eliquis  GI: pantoprazole  Diet: Renal diet  Activity: Increase as tolerated  Dispo: Acute for now

## 2022-03-07 NOTE — CONSULT NOTE ADULT - SUBJECTIVE AND OBJECTIVE BOX
Nephrology Progress Note    RACHEL SUMMERS  MRN-[id number]  86y  Female    S:  Patient is seen and examined, events over the last 24h noted.    O:  Allergies:  Keflex (Swelling)    Hospital Medications:   MEDICATIONS  (STANDING):  apixaban 2.5 milliGRAM(s) Oral two times a day  aspirin  chewable 81 milliGRAM(s) Oral daily  atorvastatin 20 milliGRAM(s) Oral at bedtime  furosemide   Injectable 40 milliGRAM(s) IV Push every 12 hours  hydrALAZINE 25 milliGRAM(s) Oral three times a day  isosorbide   mononitrate ER Tablet (IMDUR) 30 milliGRAM(s) Oral daily  latanoprost 0.005% Ophthalmic Solution 1 Drop(s) Both EYES at bedtime  metoprolol succinate ER 25 milliGRAM(s) Oral daily  pantoprazole    Tablet 40 milliGRAM(s) Oral before breakfast  sertraline 50 milliGRAM(s) Oral daily  timolol 0.5% Solution 1 Drop(s) Both EYES two times a day    MEDICATIONS  (PRN):  acetaminophen     Tablet .. 650 milliGRAM(s) Oral every 6 hours PRN Mild Pain (1 - 3)    Home Medications:  acetaminophen 325 mg oral tablet: 2 tab(s) orally every 6 hours (06 Mar 2022 18:54)  apixaban 2.5 mg oral tablet: 1 tab(s) orally 2 times a day (06 Mar 2022 18:54)  furosemide 20 mg oral tablet: 1 tab(s) orally once a day (2021 22:51)  hydrALAZINE 25 mg oral tablet: 1 tab(s) orally 3 times a day (06 Mar 2022 18:54)  isosorbide mononitrate 30 mg oral tablet, extended release: 1 tab(s) orally once a day (in the morning) (2021 22:51)  latanoprost 0.005% ophthalmic solution: 1 drop(s) to each affected eye 2 times a day (2021 22:51)  Metoprolol Succinate ER 25 mg oral tablet, extended release: 1 tab(s) orally once a day (2021 22:51)  PARICALCITOL 1 MCG CAP:  (06 Mar 2022 18:54)  sertraline 50 mg oral tablet: 1 tab(s) orally once a day (2021 22:51)  Timolol Maleate (Eqv-Timoptic) 0.5% ophthalmic solution: 1 drop(s) to each affected eye 2 times a day (2021 22:51)      VITALS:  Daily Height in cm: 160.02 (06 Mar 2022 17:49)    Daily Weight in k.3 (07 Mar 2022 05:50)  T(F): 98 (22 @ 13:57), Max: 98 (22 @ 17:49)  HR: 72 (22 @ 13:57)  BP: 159/80 (22 @ 13:57)  RR: 18 (22 @ 13:57)  SpO2: 97% (22 @ 08:28)  Wt(kg): --  I&O's Detail    06 Mar 2022 07:01  -  07 Mar 2022 07:00  --------------------------------------------------------  IN:    Oral Fluid: 268 mL  Total IN: 268 mL    OUT:    Voided (mL): 600 mL  Total OUT: 600 mL    Total NET: -332 mL        I&O's Summary    06 Mar 2022 07:01  -  07 Mar 2022 07:00  --------------------------------------------------------  IN: 268 mL / OUT: 600 mL / NET: -332 mL      Height (cm): 160 ( @ 17:49)  Weight (kg): 55.338 ( 17:49)  BMI (kg/m2): 21.6 ( @ 17:49)  BSA (m2): 1.57 ( 17:49)    PHYSICAL EXAM:  Gen: NAD  Resp: CTAB  Card: S1/S2  Abd: soft  Extremities:   Vascular access:       LABS:          138  |  107  |  43<H>  ----------------------------<  84  4.7   |  22  |  3.1<H>    Ca    8.4<L>      07 Mar 2022 05:41  Phos  2.8       Mg     1.8         TPro  5.4<L>  /  Alb  3.1<L>  /  TBili  0.3  /  DBili      /  AST  34  /  ALT  40  /  AlkPhos  154<H>      eGFR if Non African American: 15 mL/min/1.73M2 (21 @ 11:59)  eGFR if : 18 mL/min/1.73M2 (21 @ 11:59)    Phosphorus Level, Serum: 2.8 mg/dL (22 @ 05:41)  Phosphorus Level, Serum: 3.4 mg/dL (21 @ 21:41)    Intact PTH: 32 pg/mL (22 @ 05:41)  Intact PTH: 65 pg/mL (18 @ 06:59)                          7.9    9.89  )-----------( 279      ( 07 Mar 2022 05:41 )             25.2     Mean Cell Volume: 93.3 fL (22 @ 05:41)    Ferritin, Serum: 490 ng/mL (22 @ 05:41)  % Saturation, Iron: 19 % (20 @ 21:14)      Urine Studies:      Urea Nitrogen,  Random Urine: 242 mg/dL (20 @ 12:35)    Sodium, Random Urine: 70.0 mmoL/L ( @ 12:35)  Osmolality, Random Urine: 290 mos/kg ( @ 12:35)    Creatinine trend:  Creatinine, Serum: 3.1 mg/dL (22 @ 05:41)  Creatinine, Serum: 3.3 mg/dL (22 @ 12:32)  Creatinine, Serum: 2.7 mg/dL (21 @ 11:59)  Creatinine, Serum: 2.9 mg/dL (21 @ 21:41)  Creatinine, Serum: 2.6 mg/dL (21 @ 04:30)  Creatinine, Serum: 2.5 mg/dL (21 @ 18:05)  Creatinine, Serum: 3.5 mg/dL (20 @ 07:08)    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 Correlation is made with CT abdomen and pelvis 2020    TECHNIQUE: Sonography of the kidneys and bladder.    FINDINGS:    Right kidney: 9.0 cm. No renal mass, hydronephrosis or calculi. Unchanged right-sided cysts measuring up to 1.2 cm.    Left kidney: Partially obscured secondary to bowel gas. 8.1 cm. No renal mass, hydronephrosis or calculi.    Urinary bladder: No debris or calculus. Bilateral ureteral jets are visualized. Prevoid volume of approximately 248 cc. Patient was unable to void during examination.    IMPRESSION:    No hydronephrosis bilaterally.    Urinary bladder volume of 248 cc. Patient unable to void during examination.              ELLIOT LANDAU MD; Attending Radiologist  This document has been electronically signed. Dec 25 2020  7:39AM (20 @ 05:39)     NEPHROLOGY CONSULTATION NOTE    RACHEL SUMMERS  86y  Female  MRN-[id number]    CC:   Patient is a 86y old  Female who presents with a chief complaint of Shortness of breath and chest pain (07 Mar 2022 15:58)      HPI:  86y Female pmh HFrEF s/p AICD, pulm HTN, DVT, CKD complaining of chest pain for the past 1 week. Patient reports having intermittent sharp left side chest pain 3/10, no aggravating or relieving factors associated with SOB. chest pain is reproducible on palpation. Patient was recently admitted in [facility] 1 week ago with similar pain and mentions that her cardiac work up was negative and she was discharged home. She also reports having occasional lower abdominal pain and difficulty swallowing. s/p EGD and colonoscopy in 2020. Patient denies any fever, chills, cough, n/v/d.  In ED: Temp = 98; HR = 86; BP = 171/108; RR = 18; SaO2 = 96% on room air. Work up noted for EKG: t-wave inversions on leads 2,3 AVF and V4-V6; trops 0.02, hgb 8.7, creatinine 3.3, CXR: right paratracheal and perihilar opacity + bibasilar opacities.  Patient received Aspirin 325 mg x 1. (06 Mar 2022 17:39)      PAST MEDICAL & SURGICAL HISTORY:  Chronic kidney disease    Pacemaker    Hypertension    Gout    Diverticulitis  sigmoid    Diastolic heart failure    Rheumatoid arthritis    DVT, lower extremity    Artificial pacemaker    History of appendectomy    History of tonsillectomy    History of hysterectomy      Allergies:  Keflex (Swelling)    Home Medications Reviewed  Hospital Medications:   MEDICATIONS  (STANDING):  apixaban 2.5 milliGRAM(s) Oral two times a day  aspirin  chewable 81 milliGRAM(s) Oral daily  atorvastatin 20 milliGRAM(s) Oral at bedtime  furosemide   Injectable 40 milliGRAM(s) IV Push every 12 hours  hydrALAZINE 25 milliGRAM(s) Oral three times a day  isosorbide   mononitrate ER Tablet (IMDUR) 30 milliGRAM(s) Oral daily  latanoprost 0.005% Ophthalmic Solution 1 Drop(s) Both EYES at bedtime  metoprolol succinate ER 25 milliGRAM(s) Oral daily  pantoprazole    Tablet 40 milliGRAM(s) Oral before breakfast  sertraline 50 milliGRAM(s) Oral daily  timolol 0.5% Solution 1 Drop(s) Both EYES two times a day    MEDICATIONS  (PRN):  acetaminophen     Tablet .. 650 milliGRAM(s) Oral every 6 hours PRN Mild Pain (1 - 3)    Home Medications:  acetaminophen 325 mg oral tablet: 2 tab(s) orally every 6 hours (06 Mar 2022 18:54)  apixaban 2.5 mg oral tablet: 1 tab(s) orally 2 times a day (06 Mar 2022 18:54)  furosemide 20 mg oral tablet: 1 tab(s) orally once a day (2021 22:51)  hydrALAZINE 25 mg oral tablet: 1 tab(s) orally 3 times a day (06 Mar 2022 18:54)  isosorbide mononitrate 30 mg oral tablet, extended release: 1 tab(s) orally once a day (in the morning) (2021 22:51)  latanoprost 0.005% ophthalmic solution: 1 drop(s) to each affected eye 2 times a day (2021 22:51)  Metoprolol Succinate ER 25 mg oral tablet, extended release: 1 tab(s) orally once a day (2021 22:51)  PARICALCITOL 1 MCG CAP:  (06 Mar 2022 18:54)  sertraline 50 mg oral tablet: 1 tab(s) orally once a day (2021 22:51)  Timolol Maleate (Eqv-Timoptic) 0.5% ophthalmic solution: 1 drop(s) to each affected eye 2 times a day (2021 22:51)      SOCIAL HISTORY:  Social History:      FAMILY HISTORY:  FH: arthritis  sister        REVIEW OF SYSTEMS:  All other review of systems is negative unless indicated above.    VITALS:  T(F): 97.3 (22 @ 20:45), Max: 98 (22 @ 13:57)  HR: 77 (22 @ 20:45)  BP: 138/65 (22 @ 20:45)  RR: 18 (22 @ 20:45)  SpO2: 97% (22 @ 08:28)      I&O's Detail    06 Mar 2022 07:01  -  07 Mar 2022 07:00  --------------------------------------------------------  IN:    Oral Fluid: 268 mL  Total IN: 268 mL    OUT:    Voided (mL): 600 mL  Total OUT: 600 mL    Total NET: -332 mL      07 Mar 2022 07:01  -  07 Mar 2022 22:19  --------------------------------------------------------  IN:  Total IN: 0 mL    OUT:    Voided (mL): 175 mL  Total OUT: 175 mL    Total NET: -175 mL            I&O's Summary    06 Mar 2022 07:01  -  07 Mar 2022 07:00  --------------------------------------------------------  IN: 268 mL / OUT: 600 mL / NET: -332 mL    07 Mar 2022 07:01  -  07 Mar 2022 22:19  --------------------------------------------------------  IN: 0 mL / OUT: 175 mL / NET: -175 mL        PHYSICAL EXAM:  Gen: NAD  resp: decreased bs b/l  card: S1/S2  abd: soft  ext: +edema    LABS:  Daily     Daily Weight in k.3 (07 Mar 2022 05:50)          135  |  106  |  40<H>  ----------------------------<  147<H>  5.2<H>   |  19  |  2.8<H>    Ca    8.2<L>      07 Mar 2022 17:10  Phos  2.8     03  Mg     1.8         TPro  5.4<L>  /  Alb  3.1<L>  /  TBili  0.3  /  DBili      /  AST  34  /  ALT  40  /  AlkPhos  154<H>      eGFR if Non African American: 15 mL/min/1.73M2 (21 @ 11:59)  eGFR if : 18 mL/min/1.73M2 (21 @ 11:59)    Creatinine Trend:   Creatinine, Serum: 2.8 mg/dL (22 @ 17:10)  Creatinine, Serum: 3.1 mg/dL (22 @ 05:41)  Creatinine, Serum: 3.3 mg/dL (22 @ 12:32)  Creatinine, Serum: 2.7 mg/dL (21 @ 11:59)  Creatinine, Serum: 2.9 mg/dL (21 @ 21:41)  Creatinine, Serum: 2.6 mg/dL (21 @ 04:30)    Intact PTH: 32 pg/mL (22 @ 05:41)                          7.9    9.89  )-----------( 279      ( 07 Mar 2022 05:41 )             25.2     Mean Cell Volume: 93.3 fL (22 @ 05:41)    Urine Studies:         @ 05:41  8.4  32  --        RADIOLOGY & ADDITIONAL STUDIES:    US Kidney and Bladder:   EXAM:  US KIDNEYS AND BLADDER            PROCEDURE DATE:  2020            INTERPRETATION:  CLINICAL INFORMATION: Chronic kidney disease    COMPARISON: 2020. Correlation is made with CT abdomen and pelvis 2020    TECHNIQUE: Sonography of the kidneys and bladder.    FINDINGS:    Right kidney: 9.0 cm. No renal mass, hydronephrosis or calculi. Unchanged right-sided cysts measuring up to 1.2 cm.    Left kidney: Partially obscured secondary to bowel gas. 8.1 cm. No renal mass, hydronephrosis or calculi.    Urinary bladder: No debris or calculus. Bilateral ureteral jets are visualized. Prevoid volume of approximately 248 cc. Patient was unable to void during examination.    IMPRESSION:    No hydronephrosis bilaterally.    Urinary bladder volume of 248 cc. Patient unable to void during examination.              ELLIOT LANDAU MD; Attending Radiologist  This document has been electronically signed. Dec 25 2020  7:39AM (20 @ 05:39)      Xray Chest 1 View- PORTABLE-Urgent:   ACC: [id number] EXAM:  XR CHEST PORTABLE URGENT 1V                          PROCEDURE DATE:  2022          INTERPRETATION:  Clinical History / Reason for exam: Chest pain    Comparison : Chest radiograph 2020    Technique/Positioning: Frontal, portable and rotated to left.    Findings:    Support devices: Left-sided cardiac pacing device    Cardiac/mediastinum/hilum: New right paratracheal and right perihilar   opacity    Lung parenchyma/Pleura: New bibasilar opacities    Skeleton/soft tissues: Degenerative change    Impression:    New right paratracheal and perihilar opacity.    Underlying lymphadenopathy/neoplasm is not excluded.    Further evaluation with a PA and lateral view the chest is recommended.    New bibasilar opacities        --- End of Report ---            MAHI FUNK MD; Attending Radiologist  This document has been electronically signed. Mar  6 2022  4:49PM (22 @ 12:43)      < from: TTE Echo Complete w/o Contrast w/ Doppler (22 @ 09:56) >    Summary:   1. Left ventricular ejection fraction, by visual estimation, is 25 to   30%.   2. Moderately to severely decreased global left ventricular systolic   function.   3. Spectral Doppler shows impaired relaxation pattern of left   ventricular myocardial filling (Grade I diastolic dysfunction).   4. Moderately enlarged leftatrium.   5. Normal right atrial size.   6. Mild thickening of the anterior and posterior mitral valve leaflets.   7. Moderate mitral valve regurgitation.   8. Severe mitral annular calcification.   9. Moderate tricuspid regurgitation.  10. PSAP 40.  11. Mild pulmonic valve regurgitation.    < end of copied text >

## 2022-03-07 NOTE — CONSULT NOTE ADULT - ASSESSMENT
86F w/ pmh of CKD 4, HFrEF s/p AICD, pulm HTN, DVT, p/w chest pain x 1 wk, complaining of chest pain for the past 1 week a/w SOB, recently admitted to [facility] 1 week ago with similar pain and mentions that her cardiac work up was negative and she was discharged home.      # OMERO on CKD 4 / CRS  / follows w/ Dr. Urrutia (last seen in 2020, creat ~2.5)  # ADHF / HFrEF 25-30%  # HTN    - non oliguric  - creat down-trending on iv lasix, near baseline    Recommendations:  - cont iv lasix to maintain negative fluid balance  - strict i/o  - UA, urine lytes, urine prot/creat ratio  - obtain renal/bladder ultrasound  - BP controlled  - low salt diet  - phos level noted, no need for phos binder  - replete Mg to 2  - check iron stores  - appreciate cardio f/u

## 2022-03-08 LAB
ALBUMIN SERPL ELPH-MCNC: 3.2 G/DL — LOW (ref 3.5–5.2)
ALP SERPL-CCNC: 150 U/L — HIGH (ref 30–115)
ALT FLD-CCNC: 34 U/L — SIGNIFICANT CHANGE UP (ref 0–41)
ANION GAP SERPL CALC-SCNC: 12 MMOL/L — SIGNIFICANT CHANGE UP (ref 7–14)
APPEARANCE UR: ABNORMAL
AST SERPL-CCNC: 30 U/L — SIGNIFICANT CHANGE UP (ref 0–41)
BACTERIA # UR AUTO: NEGATIVE — SIGNIFICANT CHANGE UP
BASOPHILS # BLD AUTO: 0.04 K/UL — SIGNIFICANT CHANGE UP (ref 0–0.2)
BASOPHILS NFR BLD AUTO: 0.4 % — SIGNIFICANT CHANGE UP (ref 0–1)
BILIRUB SERPL-MCNC: 0.2 MG/DL — SIGNIFICANT CHANGE UP (ref 0.2–1.2)
BILIRUB UR-MCNC: NEGATIVE — SIGNIFICANT CHANGE UP
BUN SERPL-MCNC: 38 MG/DL — HIGH (ref 10–20)
CALCIUM SERPL-MCNC: 8.1 MG/DL — LOW (ref 8.5–10.1)
CHLORIDE SERPL-SCNC: 105 MMOL/L — SIGNIFICANT CHANGE UP (ref 98–110)
CO2 SERPL-SCNC: 21 MMOL/L — SIGNIFICANT CHANGE UP (ref 17–32)
COLOR SPEC: SIGNIFICANT CHANGE UP
CREAT ?TM UR-MCNC: 53 MG/DL — SIGNIFICANT CHANGE UP
CREAT SERPL-MCNC: 2.9 MG/DL — HIGH (ref 0.7–1.5)
DIFF PNL FLD: NEGATIVE — SIGNIFICANT CHANGE UP
EGFR: 15 ML/MIN/1.73M2 — LOW
EOSINOPHIL # BLD AUTO: 0.51 K/UL — SIGNIFICANT CHANGE UP (ref 0–0.7)
EOSINOPHIL NFR BLD AUTO: 5.3 % — SIGNIFICANT CHANGE UP (ref 0–8)
EPI CELLS # UR: 0 /HPF — SIGNIFICANT CHANGE UP (ref 0–5)
GLUCOSE SERPL-MCNC: 81 MG/DL — SIGNIFICANT CHANGE UP (ref 70–99)
GLUCOSE UR QL: NEGATIVE — SIGNIFICANT CHANGE UP
HCT VFR BLD CALC: 27.7 % — LOW (ref 37–47)
HGB BLD-MCNC: 8.6 G/DL — LOW (ref 12–16)
HYALINE CASTS # UR AUTO: 3 /LPF — SIGNIFICANT CHANGE UP (ref 0–7)
IMM GRANULOCYTES NFR BLD AUTO: 1.3 % — HIGH (ref 0.1–0.3)
KETONES UR-MCNC: NEGATIVE — SIGNIFICANT CHANGE UP
LEUKOCYTE ESTERASE UR-ACNC: ABNORMAL
LYMPHOCYTES # BLD AUTO: 2.17 K/UL — SIGNIFICANT CHANGE UP (ref 1.2–3.4)
LYMPHOCYTES # BLD AUTO: 22.4 % — SIGNIFICANT CHANGE UP (ref 20.5–51.1)
MAGNESIUM SERPL-MCNC: 1.8 MG/DL — SIGNIFICANT CHANGE UP (ref 1.8–2.4)
MCHC RBC-ENTMCNC: 29 PG — SIGNIFICANT CHANGE UP (ref 27–31)
MCHC RBC-ENTMCNC: 31 G/DL — LOW (ref 32–37)
MCV RBC AUTO: 93.3 FL — SIGNIFICANT CHANGE UP (ref 81–99)
MONOCYTES # BLD AUTO: 1.09 K/UL — HIGH (ref 0.1–0.6)
MONOCYTES NFR BLD AUTO: 11.2 % — HIGH (ref 1.7–9.3)
NEUTROPHILS # BLD AUTO: 5.76 K/UL — SIGNIFICANT CHANGE UP (ref 1.4–6.5)
NEUTROPHILS NFR BLD AUTO: 59.4 % — SIGNIFICANT CHANGE UP (ref 42.2–75.2)
NITRITE UR-MCNC: NEGATIVE — SIGNIFICANT CHANGE UP
NRBC # BLD: 0 /100 WBCS — SIGNIFICANT CHANGE UP (ref 0–0)
PH UR: 6 — SIGNIFICANT CHANGE UP (ref 5–8)
PLATELET # BLD AUTO: 285 K/UL — SIGNIFICANT CHANGE UP (ref 130–400)
POTASSIUM SERPL-MCNC: 4.6 MMOL/L — SIGNIFICANT CHANGE UP (ref 3.5–5)
POTASSIUM SERPL-SCNC: 4.6 MMOL/L — SIGNIFICANT CHANGE UP (ref 3.5–5)
PROT SERPL-MCNC: 5.8 G/DL — LOW (ref 6–8)
PROT UR-MCNC: ABNORMAL
RBC # BLD: 2.97 M/UL — LOW (ref 4.2–5.4)
RBC # FLD: 15.8 % — HIGH (ref 11.5–14.5)
RBC CASTS # UR COMP ASSIST: 5 /HPF — HIGH (ref 0–4)
SODIUM SERPL-SCNC: 138 MMOL/L — SIGNIFICANT CHANGE UP (ref 135–146)
SODIUM UR-SCNC: 109 MMOL/L — SIGNIFICANT CHANGE UP
SP GR SPEC: 1.01 — SIGNIFICANT CHANGE UP (ref 1.01–1.03)
UROBILINOGEN FLD QL: SIGNIFICANT CHANGE UP
WBC # BLD: 9.7 K/UL — SIGNIFICANT CHANGE UP (ref 4.8–10.8)
WBC # FLD AUTO: 9.7 K/UL — SIGNIFICANT CHANGE UP (ref 4.8–10.8)
WBC UR QL: 54 /HPF — HIGH (ref 0–5)

## 2022-03-08 PROCEDURE — 99233 SBSQ HOSP IP/OBS HIGH 50: CPT

## 2022-03-08 RX ORDER — MAGNESIUM SULFATE 500 MG/ML
2 VIAL (ML) INJECTION ONCE
Refills: 0 | Status: COMPLETED | OUTPATIENT
Start: 2022-03-08 | End: 2022-03-08

## 2022-03-08 RX ADMIN — APIXABAN 2.5 MILLIGRAM(S): 2.5 TABLET, FILM COATED ORAL at 05:06

## 2022-03-08 RX ADMIN — ATORVASTATIN CALCIUM 20 MILLIGRAM(S): 80 TABLET, FILM COATED ORAL at 21:30

## 2022-03-08 RX ADMIN — Medication 25 GRAM(S): at 13:20

## 2022-03-08 RX ADMIN — SERTRALINE 50 MILLIGRAM(S): 25 TABLET, FILM COATED ORAL at 12:37

## 2022-03-08 RX ADMIN — Medication 40 MILLIGRAM(S): at 17:34

## 2022-03-08 RX ADMIN — Medication 25 MILLIGRAM(S): at 21:30

## 2022-03-08 RX ADMIN — ISOSORBIDE MONONITRATE 30 MILLIGRAM(S): 60 TABLET, EXTENDED RELEASE ORAL at 12:37

## 2022-03-08 RX ADMIN — Medication 81 MILLIGRAM(S): at 12:37

## 2022-03-08 RX ADMIN — APIXABAN 2.5 MILLIGRAM(S): 2.5 TABLET, FILM COATED ORAL at 06:36

## 2022-03-08 RX ADMIN — Medication 25 MILLIGRAM(S): at 05:06

## 2022-03-08 RX ADMIN — LATANOPROST 1 DROP(S): 0.05 SOLUTION/ DROPS OPHTHALMIC; TOPICAL at 21:30

## 2022-03-08 RX ADMIN — Medication 1 DROP(S): at 05:07

## 2022-03-08 RX ADMIN — APIXABAN 2.5 MILLIGRAM(S): 2.5 TABLET, FILM COATED ORAL at 17:33

## 2022-03-08 RX ADMIN — PANTOPRAZOLE SODIUM 40 MILLIGRAM(S): 20 TABLET, DELAYED RELEASE ORAL at 05:06

## 2022-03-08 RX ADMIN — Medication 1 DROP(S): at 19:01

## 2022-03-08 RX ADMIN — Medication 40 MILLIGRAM(S): at 05:06

## 2022-03-08 RX ADMIN — Medication 25 MILLIGRAM(S): at 13:20

## 2022-03-08 NOTE — PHYSICAL THERAPY INITIAL EVALUATION ADULT - PERTINENT HX OF CURRENT PROBLEM, REHAB EVAL
86y Female pmh HFrEF s/p AICD, pulm HTN, DVT, CKD complaining of chest pain for the past 1 week. Patient reports having intermittent sharp left side chest pain 3/10, no aggravating or relieving factors associated with SOB. chest pain is reproducible on palpation. She also reports having occasional lower abdominal pain and difficulty swallowing.

## 2022-03-08 NOTE — PROGRESS NOTE ADULT - ASSESSMENT
86F w/ pmh of CKD 4, HFrEF s/p AICD, pulm HTN, DVT, p/w chest pain x 1 wk, complaining of chest pain for the past 1 week a/w SOB, recently       # OMERO on CKD 4 / CRS  / follows w/ Dr. Urrutia (last seen in 2020, creat ~2.5)  # ADHF / HFrEF 25-30%  # HTN    - non oliguric  - creat down-trending on iv lasix, near baseline    Recommendations:  - cont iv lasix to maintain negative fluid balance  - strict i/o  - UA, urine lytes, urine prot/creat ratio  - obtain renal/bladder ultrasound  - BP controlled  - ensure low salt diet  - phos level noted, no need for phos binder

## 2022-03-08 NOTE — PROGRESS NOTE ADULT - ASSESSMENT
86y Female pmh HFrEF s/p AICD, pulm HTN, DVT, CKD complaining of chest pain for the past 1 week. Patient reports having intermittent sharp left side chest pain 3/10, no aggravating or relieving factors associated with SOB. Chest pain is reproducible on palpation. Patient was recently admitted in [facility] 1 week ago with similar pain and mentions that her cardiac work up was negative and she was discharged home. She also reports having occasional lower abdominal pain and difficulty swallowing. s/p EGD and colonoscopy in 07/2020.    # atypical chest pain / bilateral opacities on CXR  - EKG noted for T-wave inversions that are secondary to the LBBB; Q waves in lead III  - elevated trops can be due to CKD, troponins are stable, patient no longer complains of chest pain  - admitted to tele  - TTE: depressed LVEF 20-25%; moderate mitral and tricuspid regurgitation; enlarged LA  - c/w home dose of hydralazine, nitrates and toprol; no ACEi/ARB/Entresto  - atorvastatin back to home dose 20 mg qday  - on aspirin, eliquis; no Plavix  - As for the volume overload, Patient is on Lasix 40 mg IV BID; will monitor Is and Os through the day and see if switch to PO diuretics    # HTN  - BP noted  - Home meds: toprol, hydralazine and lasix  - c/w toprol and hydralazine  - monitor BP    # elevated serum creatinine: baseline creatinine around 2.6-2.8  - OMERO on CKD vs CKD 4 progression  - follows with Dr. Urrutia as outpt  - strict i/o  - monitor serum creatinine and electrolytes; creatinine= 2.9 today, might be the baseline  - Might be cardiorenal syndrome    # Dysphagia / Anemia  - s/p EGD and colonoscopy in 2020 with non-specific gastritis and Diverticulosis noted  - Anemia is probably due to CKD    DVT: Eliquis  GI: pantoprazole  Diet: Renal diet  Activity: Increase as tolerated  Dispo: Acute for now

## 2022-03-08 NOTE — PHYSICAL THERAPY INITIAL EVALUATION ADULT - ADDITIONAL COMMENTS
Patient lives with children with steps to enter home. Has HHA 6 hours/day, 6 days/week. Patient claims she ambulates using rollator and requires supervision assistance in ADL's.

## 2022-03-09 ENCOUNTER — TRANSCRIPTION ENCOUNTER (OUTPATIENT)
Age: 87
End: 2022-03-09

## 2022-03-09 LAB
ALBUMIN SERPL ELPH-MCNC: 3.4 G/DL — LOW (ref 3.5–5.2)
ALP SERPL-CCNC: 150 U/L — HIGH (ref 30–115)
ALT FLD-CCNC: 29 U/L — SIGNIFICANT CHANGE UP (ref 0–41)
ANION GAP SERPL CALC-SCNC: 13 MMOL/L — SIGNIFICANT CHANGE UP (ref 7–14)
AST SERPL-CCNC: 27 U/L — SIGNIFICANT CHANGE UP (ref 0–41)
BASOPHILS # BLD AUTO: 0.05 K/UL — SIGNIFICANT CHANGE UP (ref 0–0.2)
BASOPHILS NFR BLD AUTO: 0.6 % — SIGNIFICANT CHANGE UP (ref 0–1)
BILIRUB SERPL-MCNC: <0.2 MG/DL — SIGNIFICANT CHANGE UP (ref 0.2–1.2)
BUN SERPL-MCNC: 34 MG/DL — HIGH (ref 10–20)
CALCIUM SERPL-MCNC: 7.7 MG/DL — LOW (ref 8.5–10.1)
CHLORIDE SERPL-SCNC: 98 MMOL/L — SIGNIFICANT CHANGE UP (ref 98–110)
CO2 SERPL-SCNC: 23 MMOL/L — SIGNIFICANT CHANGE UP (ref 17–32)
CREAT SERPL-MCNC: 2.8 MG/DL — HIGH (ref 0.7–1.5)
EGFR: 16 ML/MIN/1.73M2 — LOW
EOSINOPHIL # BLD AUTO: 0.45 K/UL — SIGNIFICANT CHANGE UP (ref 0–0.7)
EOSINOPHIL NFR BLD AUTO: 5.1 % — SIGNIFICANT CHANGE UP (ref 0–8)
GLUCOSE SERPL-MCNC: 122 MG/DL — HIGH (ref 70–99)
HCT VFR BLD CALC: 29 % — LOW (ref 37–47)
HGB BLD-MCNC: 9 G/DL — LOW (ref 12–16)
IMM GRANULOCYTES NFR BLD AUTO: 1.3 % — HIGH (ref 0.1–0.3)
LYMPHOCYTES # BLD AUTO: 2.28 K/UL — SIGNIFICANT CHANGE UP (ref 1.2–3.4)
LYMPHOCYTES # BLD AUTO: 25.6 % — SIGNIFICANT CHANGE UP (ref 20.5–51.1)
MAGNESIUM SERPL-MCNC: 2.3 MG/DL — SIGNIFICANT CHANGE UP (ref 1.8–2.4)
MCHC RBC-ENTMCNC: 29 PG — SIGNIFICANT CHANGE UP (ref 27–31)
MCHC RBC-ENTMCNC: 31 G/DL — LOW (ref 32–37)
MCV RBC AUTO: 93.5 FL — SIGNIFICANT CHANGE UP (ref 81–99)
MONOCYTES # BLD AUTO: 1.12 K/UL — HIGH (ref 0.1–0.6)
MONOCYTES NFR BLD AUTO: 12.6 % — HIGH (ref 1.7–9.3)
NEUTROPHILS # BLD AUTO: 4.87 K/UL — SIGNIFICANT CHANGE UP (ref 1.4–6.5)
NEUTROPHILS NFR BLD AUTO: 54.8 % — SIGNIFICANT CHANGE UP (ref 42.2–75.2)
NRBC # BLD: 0 /100 WBCS — SIGNIFICANT CHANGE UP (ref 0–0)
PLATELET # BLD AUTO: 285 K/UL — SIGNIFICANT CHANGE UP (ref 130–400)
POTASSIUM SERPL-MCNC: 4.1 MMOL/L — SIGNIFICANT CHANGE UP (ref 3.5–5)
POTASSIUM SERPL-SCNC: 4.1 MMOL/L — SIGNIFICANT CHANGE UP (ref 3.5–5)
PROT SERPL-MCNC: 6.1 G/DL — SIGNIFICANT CHANGE UP (ref 6–8)
RBC # BLD: 3.1 M/UL — LOW (ref 4.2–5.4)
RBC # FLD: 15.7 % — HIGH (ref 11.5–14.5)
SODIUM SERPL-SCNC: 134 MMOL/L — LOW (ref 135–146)
UUN UR-MCNC: 292 MG/DL — SIGNIFICANT CHANGE UP
WBC # BLD: 8.89 K/UL — SIGNIFICANT CHANGE UP (ref 4.8–10.8)
WBC # FLD AUTO: 8.89 K/UL — SIGNIFICANT CHANGE UP (ref 4.8–10.8)

## 2022-03-09 PROCEDURE — 71045 X-RAY EXAM CHEST 1 VIEW: CPT | Mod: 26

## 2022-03-09 PROCEDURE — 99233 SBSQ HOSP IP/OBS HIGH 50: CPT

## 2022-03-09 RX ORDER — ACETAMINOPHEN 500 MG
650 TABLET ORAL ONCE
Refills: 0 | Status: COMPLETED | OUTPATIENT
Start: 2022-03-09 | End: 2022-03-09

## 2022-03-09 RX ORDER — HYDRALAZINE HCL 50 MG
50 TABLET ORAL THREE TIMES A DAY
Refills: 0 | Status: DISCONTINUED | OUTPATIENT
Start: 2022-03-09 | End: 2022-03-14

## 2022-03-09 RX ADMIN — Medication 1 DROP(S): at 17:24

## 2022-03-09 RX ADMIN — Medication 50 MILLIGRAM(S): at 16:13

## 2022-03-09 RX ADMIN — Medication 25 MILLIGRAM(S): at 05:15

## 2022-03-09 RX ADMIN — ATORVASTATIN CALCIUM 20 MILLIGRAM(S): 80 TABLET, FILM COATED ORAL at 21:36

## 2022-03-09 RX ADMIN — Medication 30 MILLILITER(S): at 02:20

## 2022-03-09 RX ADMIN — Medication 650 MILLIGRAM(S): at 02:20

## 2022-03-09 RX ADMIN — APIXABAN 2.5 MILLIGRAM(S): 2.5 TABLET, FILM COATED ORAL at 17:20

## 2022-03-09 RX ADMIN — SERTRALINE 50 MILLIGRAM(S): 25 TABLET, FILM COATED ORAL at 11:02

## 2022-03-09 RX ADMIN — Medication 40 MILLIGRAM(S): at 17:24

## 2022-03-09 RX ADMIN — Medication 81 MILLIGRAM(S): at 11:02

## 2022-03-09 RX ADMIN — Medication 650 MILLIGRAM(S): at 02:51

## 2022-03-09 RX ADMIN — LATANOPROST 1 DROP(S): 0.05 SOLUTION/ DROPS OPHTHALMIC; TOPICAL at 21:36

## 2022-03-09 RX ADMIN — PANTOPRAZOLE SODIUM 40 MILLIGRAM(S): 20 TABLET, DELAYED RELEASE ORAL at 05:15

## 2022-03-09 RX ADMIN — ISOSORBIDE MONONITRATE 30 MILLIGRAM(S): 60 TABLET, EXTENDED RELEASE ORAL at 11:02

## 2022-03-09 RX ADMIN — Medication 40 MILLIGRAM(S): at 05:17

## 2022-03-09 RX ADMIN — APIXABAN 2.5 MILLIGRAM(S): 2.5 TABLET, FILM COATED ORAL at 05:15

## 2022-03-09 RX ADMIN — Medication 1 DROP(S): at 05:17

## 2022-03-09 NOTE — PROGRESS NOTE ADULT - ASSESSMENT
86y Female pmh HFrEF s/p AICD, pulm HTN, DVT, CKD complaining of chest pain for the past 1 week. Patient reports having intermittent sharp left side chest pain 3/10, no aggravating or relieving factors associated with SOB. Chest pain is reproducible on palpation. Patient was recently admitted in [facility] 1 week ago with similar pain and mentions that her cardiac work up was negative and she was discharged home. She also reports having occasional lower abdominal pain and difficulty swallowing. s/p EGD and colonoscopy in 07/2020.    # atypical chest pain / bilateral opacities on CXR  - EKG noted for T-wave inversions that are secondary to the LBBB; Q waves in lead III  - elevated trops can be due to CKD, troponins are stable, patient no longer complains of chest pain  - admitted to tele  - TTE: depressed LVEF 20-25%; moderate mitral and tricuspid regurgitation; enlarged LA  - c/w home dose of hydralazine, nitrates and toprol; no ACEi/ARB/Entresto  - atorvastatin back to home dose 20 mg qday  - on aspirin, eliquis; no Plavix  - As for the volume overload, Patient is on Lasix 40 mg IV BID; will monitor Is and Os through the day and see if switch to PO diuretics, probably tomorrow    # HTN  - BP noted  - Home meds: toprol, hydralazine and lasix  - c/w toprol and hydralazine  - monitor BP, will see if we should add ACEi/ARB    # elevated serum creatinine: baseline creatinine around 2.6-2.8  - OMERO on CKD vs CKD 4 progression  - follows with Dr. Urrutia as outpt  - strict i/o  - monitor serum creatinine and electrolytes; creatinine= 2.8 today, might be the baseline  - Might be cardiorenal syndrome    # Dysphagia / Anemia  - s/p EGD and colonoscopy in 2020 with non-specific gastritis and Diverticulosis noted  - Anemia is probably due to CKD    DVT: Eliquis  GI: pantoprazole  Diet: Renal diet  Activity: Increase as tolerated  Dispo: Acute for now

## 2022-03-09 NOTE — PROGRESS NOTE ADULT - ASSESSMENT
86F w/ pmh of CKD 4, HFrEF s/p AICD, pulm HTN, DVT, p/w chest pain x 1 wk, complaining of chest pain for the past 1 week a/w SOB, recently       # OMERO on CKD 4 / CRS  / follows w/ Dr. Urrutia (last seen in 2020, creat ~2.5)  # ADHF / HFrEF, EF 25-30%  # HTN  # Normocytic anemia     - non oliguric  - creat improved to baseline, stable    Recommendations:  - cont lasix 40mg iv q12h / cont to maintain negative fluid balance  - please document strict i/o and daily weights  - UA noted, +protein, WBCs (pt is asymptomatic)  /  check urine prot/creat ratio, serum free light chain ratio, spep/hamilton  - appreciate cardio f/u   - obtain renal/bladder ultrasound with pvr  - phos level noted, no need for phos binder  - check iron stores  - BP overall controlled  - low salt diet, limit fluid intake to 1 liter daily to avoid worsening hyponatremia

## 2022-03-09 NOTE — DISCHARGE NOTE NURSING/CASE MANAGEMENT/SOCIAL WORK - NSDCPEFALRISK_GEN_ALL_CORE
For information on Fall & Injury Prevention, visit: [email or web address] OR  [email or web address] OR  https://www.cdc.gov/steadi/patient.html

## 2022-03-09 NOTE — DISCHARGE NOTE NURSING/CASE MANAGEMENT/SOCIAL WORK - PATIENT PORTAL LINK FT
You can access the FollowMyHealth Patient Portal offered by [facility] by registering at the following website: [email or web address]. By joining [facility]’s FollowMyHealth portal, you will also be able to view your health information using other applications (apps) compatible with our system.

## 2022-03-10 LAB
ALBUMIN SERPL ELPH-MCNC: 3.3 G/DL — LOW (ref 3.5–5.2)
ALP SERPL-CCNC: 146 U/L — HIGH (ref 30–115)
ALT FLD-CCNC: 27 U/L — SIGNIFICANT CHANGE UP (ref 0–41)
ANION GAP SERPL CALC-SCNC: 10 MMOL/L — SIGNIFICANT CHANGE UP (ref 7–14)
AST SERPL-CCNC: 31 U/L — SIGNIFICANT CHANGE UP (ref 0–41)
BASOPHILS # BLD AUTO: 0.05 K/UL — SIGNIFICANT CHANGE UP (ref 0–0.2)
BASOPHILS NFR BLD AUTO: 0.6 % — SIGNIFICANT CHANGE UP (ref 0–1)
BILIRUB SERPL-MCNC: <0.2 MG/DL — SIGNIFICANT CHANGE UP (ref 0.2–1.2)
BUN SERPL-MCNC: 36 MG/DL — HIGH (ref 10–20)
CALCIUM SERPL-MCNC: 7.7 MG/DL — LOW (ref 8.5–10.1)
CHLORIDE SERPL-SCNC: 99 MMOL/L — SIGNIFICANT CHANGE UP (ref 98–110)
CO2 SERPL-SCNC: 25 MMOL/L — SIGNIFICANT CHANGE UP (ref 17–32)
CREAT SERPL-MCNC: 2.8 MG/DL — HIGH (ref 0.7–1.5)
EGFR: 16 ML/MIN/1.73M2 — LOW
EOSINOPHIL # BLD AUTO: 0.45 K/UL — SIGNIFICANT CHANGE UP (ref 0–0.7)
EOSINOPHIL NFR BLD AUTO: 5.1 % — SIGNIFICANT CHANGE UP (ref 0–8)
FERRITIN SERPL-MCNC: 470 NG/ML — HIGH (ref 15–150)
GLUCOSE SERPL-MCNC: 113 MG/DL — HIGH (ref 70–99)
HCT VFR BLD CALC: 29.6 % — LOW (ref 37–47)
HGB BLD-MCNC: 9.1 G/DL — LOW (ref 12–16)
IMM GRANULOCYTES NFR BLD AUTO: 1.1 % — HIGH (ref 0.1–0.3)
LYMPHOCYTES # BLD AUTO: 2.29 K/UL — SIGNIFICANT CHANGE UP (ref 1.2–3.4)
LYMPHOCYTES # BLD AUTO: 25.9 % — SIGNIFICANT CHANGE UP (ref 20.5–51.1)
MAGNESIUM SERPL-MCNC: 2 MG/DL — SIGNIFICANT CHANGE UP (ref 1.8–2.4)
MCHC RBC-ENTMCNC: 28.8 PG — SIGNIFICANT CHANGE UP (ref 27–31)
MCHC RBC-ENTMCNC: 30.7 G/DL — LOW (ref 32–37)
MCV RBC AUTO: 93.7 FL — SIGNIFICANT CHANGE UP (ref 81–99)
MONOCYTES # BLD AUTO: 1.09 K/UL — HIGH (ref 0.1–0.6)
MONOCYTES NFR BLD AUTO: 12.3 % — HIGH (ref 1.7–9.3)
NEUTROPHILS # BLD AUTO: 4.85 K/UL — SIGNIFICANT CHANGE UP (ref 1.4–6.5)
NEUTROPHILS NFR BLD AUTO: 55 % — SIGNIFICANT CHANGE UP (ref 42.2–75.2)
NRBC # BLD: 0 /100 WBCS — SIGNIFICANT CHANGE UP (ref 0–0)
PLATELET # BLD AUTO: 336 K/UL — SIGNIFICANT CHANGE UP (ref 130–400)
POTASSIUM SERPL-MCNC: 4.5 MMOL/L — SIGNIFICANT CHANGE UP (ref 3.5–5)
POTASSIUM SERPL-SCNC: 4.5 MMOL/L — SIGNIFICANT CHANGE UP (ref 3.5–5)
PROT SERPL-MCNC: 6.1 G/DL — SIGNIFICANT CHANGE UP (ref 6–8)
RBC # BLD: 3.16 M/UL — LOW (ref 4.2–5.4)
RBC # FLD: 15.4 % — HIGH (ref 11.5–14.5)
SODIUM SERPL-SCNC: 134 MMOL/L — LOW (ref 135–146)
WBC # BLD: 8.83 K/UL — SIGNIFICANT CHANGE UP (ref 4.8–10.8)
WBC # FLD AUTO: 8.83 K/UL — SIGNIFICANT CHANGE UP (ref 4.8–10.8)

## 2022-03-10 PROCEDURE — 99232 SBSQ HOSP IP/OBS MODERATE 35: CPT

## 2022-03-10 RX ORDER — FUROSEMIDE 40 MG
40 TABLET ORAL DAILY
Refills: 0 | Status: DISCONTINUED | OUTPATIENT
Start: 2022-03-11 | End: 2022-03-14

## 2022-03-10 RX ORDER — FUROSEMIDE 40 MG
40 TABLET ORAL ONCE
Refills: 0 | Status: COMPLETED | OUTPATIENT
Start: 2022-03-10 | End: 2022-03-10

## 2022-03-10 RX ADMIN — Medication 50 MILLIGRAM(S): at 21:36

## 2022-03-10 RX ADMIN — APIXABAN 2.5 MILLIGRAM(S): 2.5 TABLET, FILM COATED ORAL at 05:31

## 2022-03-10 RX ADMIN — Medication 50 MILLIGRAM(S): at 00:24

## 2022-03-10 RX ADMIN — LATANOPROST 1 DROP(S): 0.05 SOLUTION/ DROPS OPHTHALMIC; TOPICAL at 21:37

## 2022-03-10 RX ADMIN — Medication 40 MILLIGRAM(S): at 14:45

## 2022-03-10 RX ADMIN — Medication 100 MILLIGRAM(S): at 00:24

## 2022-03-10 RX ADMIN — ATORVASTATIN CALCIUM 20 MILLIGRAM(S): 80 TABLET, FILM COATED ORAL at 21:37

## 2022-03-10 RX ADMIN — Medication 50 MILLIGRAM(S): at 13:26

## 2022-03-10 RX ADMIN — Medication 1 DROP(S): at 17:03

## 2022-03-10 RX ADMIN — Medication 25 MILLIGRAM(S): at 05:31

## 2022-03-10 RX ADMIN — ISOSORBIDE MONONITRATE 30 MILLIGRAM(S): 60 TABLET, EXTENDED RELEASE ORAL at 11:33

## 2022-03-10 RX ADMIN — SERTRALINE 50 MILLIGRAM(S): 25 TABLET, FILM COATED ORAL at 11:27

## 2022-03-10 RX ADMIN — Medication 30 MILLILITER(S): at 14:35

## 2022-03-10 RX ADMIN — PANTOPRAZOLE SODIUM 40 MILLIGRAM(S): 20 TABLET, DELAYED RELEASE ORAL at 05:43

## 2022-03-10 RX ADMIN — Medication 1 DROP(S): at 05:31

## 2022-03-10 RX ADMIN — Medication 81 MILLIGRAM(S): at 11:27

## 2022-03-10 RX ADMIN — Medication 50 MILLIGRAM(S): at 05:31

## 2022-03-10 RX ADMIN — Medication 40 MILLIGRAM(S): at 05:31

## 2022-03-10 RX ADMIN — APIXABAN 2.5 MILLIGRAM(S): 2.5 TABLET, FILM COATED ORAL at 17:03

## 2022-03-10 NOTE — PROGRESS NOTE ADULT - ASSESSMENT
86F w/ pmh of CKD 4, HFrEF s/p AICD, pulm HTN, DVT, p/w chest pain x 1 wk, complaining of chest pain for the past 1 week a/w SOB, recently       # OMERO on CKD 4 / CRS  / follows w/ Dr. Urrutia (last seen in 2020, creat ~2.5)  # ADHF / HFrEF, EF 25-30%  # HTN  # Normocytic anemia     - non oliguric  - creat improved to baseline, stable  - pt is still fluid overloaded,  - hyponatremic    Recommendations:  - cont lasix 40mg iv q12h for a few more doses/ cont to maintain negative fluid balance  - Home dose of LAsix 40 po bid  - UA noted, +protein, WBCs (pt is asymptomatic)  /  check urine prot/creat ratio, serum free light chain ratio, spep/hamilton  - obtain renal/bladder ultrasound with pvr  - phos level noted, no need for phos binder  - BP overall controlled  - low salt diet, limit fluid intake to 1 liter daily to avoid worsening hyponatremia

## 2022-03-10 NOTE — PROGRESS NOTE ADULT - ASSESSMENT
86y Female pmh HFrEF s/p AICD, pulm HTN, DVT, CKD complaining of chest pain for the past 1 week. Patient reports having intermittent sharp left side chest pain 3/10, no aggravating or relieving factors associated with SOB. Chest pain is reproducible on palpation. Patient was recently admitted in [facility] 1 week ago with similar pain and mentions that her cardiac work up was negative and she was discharged home. She also reports having occasional lower abdominal pain and difficulty swallowing. s/p EGD and colonoscopy in 07/2020.    # atypical chest pain / bilateral opacities on CXR  - EKG noted for T-wave inversions that are secondary to the LBBB; Q waves in lead III  - elevated trops can be due to CKD, troponins are stable, patient no longer complains of chest pain  - admitted to tele  - TTE: depressed LVEF 20-25%; moderate mitral and tricuspid regurgitation; enlarged LA  - c/w home dose of hydralazine, nitrates and toprol; no ACEi/ARB/Entresto  - atorvastatin back to home dose 20 mg qday  - on aspirin, eliquis; no Plavix  - As for the volume overload, Patient is on Lasix 40 mg IV BID; will monitor Is and Os through the day and see if switch to PO diuretics, probably today    # HTN  - BP noted  - Home meds: toprol, hydralazine and lasix  - c/w toprol and hydralazine  - monitor BP, will see if we should add ACEi/ARB    # elevated serum creatinine: baseline creatinine around 2.6-2.8  - OMERO on CKD vs CKD 4 progression  - follows with Dr. Urrutia as outpt  - strict i/o  - monitor serum creatinine and electrolytes; creatinine= 2.8 today, might be the baseline  - Might be cardiorenal syndrome    # Dysphagia / Anemia  - s/p EGD and colonoscopy in 2020 with non-specific gastritis and Diverticulosis noted  - Anemia is probably due to CKD      NO ACE inhibitors/ ARB/ Entresto/ spironolactone since patient can become severely hyperkalemic     DVT: Eliquis  GI: pantoprazole  Diet: Renal diet  Activity: Increase as tolerated  Dispo: Acute for now

## 2022-03-10 NOTE — PROGRESS NOTE ADULT - TIME BILLING
acute on chronic decompensated heart failure with reduced EF, DVT

## 2022-03-11 ENCOUNTER — TRANSCRIPTION ENCOUNTER (OUTPATIENT)
Age: 87
End: 2022-03-11

## 2022-03-11 LAB
ALBUMIN SERPL ELPH-MCNC: 3.1 G/DL — LOW (ref 3.5–5.2)
ALP SERPL-CCNC: 131 U/L — HIGH (ref 30–115)
ALT FLD-CCNC: 26 U/L — SIGNIFICANT CHANGE UP (ref 0–41)
ANION GAP SERPL CALC-SCNC: 11 MMOL/L — SIGNIFICANT CHANGE UP (ref 7–14)
AST SERPL-CCNC: 36 U/L — SIGNIFICANT CHANGE UP (ref 0–41)
BILIRUB SERPL-MCNC: 0.2 MG/DL — SIGNIFICANT CHANGE UP (ref 0.2–1.2)
BUN SERPL-MCNC: 43 MG/DL — HIGH (ref 10–20)
CALCIUM SERPL-MCNC: 7.5 MG/DL — LOW (ref 8.5–10.1)
CHLORIDE SERPL-SCNC: 95 MMOL/L — LOW (ref 98–110)
CO2 SERPL-SCNC: 25 MMOL/L — SIGNIFICANT CHANGE UP (ref 17–32)
CREAT SERPL-MCNC: 3.2 MG/DL — HIGH (ref 0.7–1.5)
EGFR: 14 ML/MIN/1.73M2 — LOW
GLUCOSE SERPL-MCNC: 101 MG/DL — HIGH (ref 70–99)
HCT VFR BLD CALC: 26 % — LOW (ref 37–47)
HGB BLD-MCNC: 8.4 G/DL — LOW (ref 12–16)
MAGNESIUM SERPL-MCNC: 2.2 MG/DL — SIGNIFICANT CHANGE UP (ref 1.8–2.4)
MCHC RBC-ENTMCNC: 30.1 PG — SIGNIFICANT CHANGE UP (ref 27–31)
MCHC RBC-ENTMCNC: 32.3 G/DL — SIGNIFICANT CHANGE UP (ref 32–37)
MCV RBC AUTO: 93.2 FL — SIGNIFICANT CHANGE UP (ref 81–99)
NRBC # BLD: 0 /100 WBCS — SIGNIFICANT CHANGE UP (ref 0–0)
PLATELET # BLD AUTO: 313 K/UL — SIGNIFICANT CHANGE UP (ref 130–400)
POTASSIUM SERPL-MCNC: 4.6 MMOL/L — SIGNIFICANT CHANGE UP (ref 3.5–5)
POTASSIUM SERPL-SCNC: 4.6 MMOL/L — SIGNIFICANT CHANGE UP (ref 3.5–5)
PROT SERPL-MCNC: 5.7 G/DL — LOW (ref 6–8)
RBC # BLD: 2.79 M/UL — LOW (ref 4.2–5.4)
RBC # FLD: 15.4 % — HIGH (ref 11.5–14.5)
SODIUM SERPL-SCNC: 131 MMOL/L — LOW (ref 135–146)
WBC # BLD: 8.97 K/UL — SIGNIFICANT CHANGE UP (ref 4.8–10.8)
WBC # FLD AUTO: 8.97 K/UL — SIGNIFICANT CHANGE UP (ref 4.8–10.8)

## 2022-03-11 PROCEDURE — 99233 SBSQ HOSP IP/OBS HIGH 50: CPT

## 2022-03-11 RX ORDER — IPRATROPIUM/ALBUTEROL SULFATE 18-103MCG
3 AEROSOL WITH ADAPTER (GRAM) INHALATION ONCE
Refills: 0 | Status: COMPLETED | OUTPATIENT
Start: 2022-03-11 | End: 2022-03-11

## 2022-03-11 RX ADMIN — PANTOPRAZOLE SODIUM 40 MILLIGRAM(S): 20 TABLET, DELAYED RELEASE ORAL at 05:12

## 2022-03-11 RX ADMIN — Medication 1 DROP(S): at 05:09

## 2022-03-11 RX ADMIN — Medication 40 MILLIGRAM(S): at 05:11

## 2022-03-11 RX ADMIN — ISOSORBIDE MONONITRATE 30 MILLIGRAM(S): 60 TABLET, EXTENDED RELEASE ORAL at 11:44

## 2022-03-11 RX ADMIN — LATANOPROST 1 DROP(S): 0.05 SOLUTION/ DROPS OPHTHALMIC; TOPICAL at 22:09

## 2022-03-11 RX ADMIN — SERTRALINE 50 MILLIGRAM(S): 25 TABLET, FILM COATED ORAL at 11:44

## 2022-03-11 RX ADMIN — APIXABAN 2.5 MILLIGRAM(S): 2.5 TABLET, FILM COATED ORAL at 17:26

## 2022-03-11 RX ADMIN — Medication 650 MILLIGRAM(S): at 11:43

## 2022-03-11 RX ADMIN — Medication 50 MILLIGRAM(S): at 05:11

## 2022-03-11 RX ADMIN — Medication 50 MILLIGRAM(S): at 14:42

## 2022-03-11 RX ADMIN — Medication 25 MILLIGRAM(S): at 05:11

## 2022-03-11 RX ADMIN — APIXABAN 2.5 MILLIGRAM(S): 2.5 TABLET, FILM COATED ORAL at 05:12

## 2022-03-11 RX ADMIN — Medication 50 MILLIGRAM(S): at 22:09

## 2022-03-11 RX ADMIN — Medication 3 MILLILITER(S): at 00:31

## 2022-03-11 RX ADMIN — ATORVASTATIN CALCIUM 20 MILLIGRAM(S): 80 TABLET, FILM COATED ORAL at 22:09

## 2022-03-11 RX ADMIN — Medication 81 MILLIGRAM(S): at 11:45

## 2022-03-11 RX ADMIN — Medication 1 DROP(S): at 17:26

## 2022-03-11 NOTE — DISCHARGE NOTE PROVIDER - PROVIDER TOKENS
PROVIDER:[TOKEN:[[id number]],FOLLOWUP:[2 weeks]],PROVIDER:[TOKEN:[[id number]],FOLLOWUP:[Routine]]

## 2022-03-11 NOTE — DISCHARGE NOTE PROVIDER - CARE PROVIDER_API CALL
Sterling Mckinney)  Cardiology; Cardiovascular Disease; Internal Medicine  [address] [place] [address]  Phone: [phone]  Fax: [phone]  Follow Up Time: 2 weeks    Wade Celeste)  Internal Medicine  [address] [place] [address]  Phone: [phone]  Fax: [phone]  Follow Up Time: Routine

## 2022-03-11 NOTE — DISCHARGE NOTE PROVIDER - NSDCFUADDAPPT_GEN_ALL_CORE_FT
APPTS ARE READY TO BE MADE: [ x] YES    Best Family or Patient Contact (if needed):    Additional Information about above appointments (if needed):    1:   2:   3:     Other comments or requests:    APPTS ARE READY TO BE MADE: [ x] YES    Best Family or Patient Contact (if needed):    Additional Information about above appointments (if needed):    1:   2:   3:     Other comments or requests:   Patient was previously scheduled with Dr. Wade Celeste on 03/17/2022 11:30AM at [address]. APPTS ARE READY TO BE MADE: [ x] YES    Best Family or Patient Contact (if needed):    Additional Information about above appointments (if needed):    1:   2:   3:     Other comments or requests:   Patient was previously scheduled with Dr. Wade Celeste on 03/17/2022 11:30AM at [address].															                                                           3 attempts were made to reach patient, which have been unsuccessful. 3 Voicemails have been left 03/17, 03/18, 04/14. Will await a call back from patient to coordinate follow up  care with Dr. Sterling Mckinney.

## 2022-03-11 NOTE — DISCHARGE NOTE PROVIDER - NSDCMRMEDTOKEN_GEN_ALL_CORE_FT
acetaminophen 325 mg oral tablet: 2 tab(s) orally every 6 hours  apixaban 2.5 mg oral tablet: 1 tab(s) orally 2 times a day  furosemide 20 mg oral tablet: 1 tab(s) orally once a day  hydrALAZINE 25 mg oral tablet: 1 tab(s) orally 3 times a day  isosorbide mononitrate 30 mg oral tablet, extended release: 1 tab(s) orally once a day (in the morning)  latanoprost 0.005% ophthalmic solution: 1 drop(s) to each affected eye 2 times a day  Lipitor 20 mg oral tablet: 1 tab(s) orally once a day  Metoprolol Succinate ER 25 mg oral tablet, extended release: 1 tab(s) orally once a day  PARICALCITOL 1 MCG CAP:   sertraline 50 mg oral tablet: 1 tab(s) orally once a day  Timolol Maleate (Eqv-Timoptic) 0.5% ophthalmic solution: 1 drop(s) to each affected eye 2 times a day   apixaban 2.5 mg oral tablet: 1 tab(s) orally 2 times a day  furosemide 20 mg oral tablet: 1 tab(s) orally once a day  hydrALAZINE 25 mg oral tablet: 1 tab(s) orally 3 times a day  isosorbide mononitrate 30 mg oral tablet, extended release: 1 tab(s) orally once a day (in the morning)  latanoprost 0.005% ophthalmic solution: 1 drop(s) to each affected eye 2 times a day  Lipitor 20 mg oral tablet: 1 tab(s) orally once a day (at bedtime)  Metoprolol Succinate ER 25 mg oral tablet, extended release: 1 tab(s) orally once a day  PARICALCITOL 1 MCG CAP:   Timolol Maleate (Eqv-Timoptic) 0.5% ophthalmic solution: 1 drop(s) to each affected eye 2 times a day  Zoloft 50 mg oral tablet: 1 tab(s) orally once a day   apixaban 2.5 mg oral tablet: 1 tab(s) orally 2 times a day  furosemide 40 mg oral tablet: 1 tab(s) orally once a day  hydrALAZINE 25 mg oral tablet: 1 tab(s) orally 3 times a day  isosorbide mononitrate 30 mg oral tablet, extended release: 1 tab(s) orally once a day (in the morning)  latanoprost 0.005% ophthalmic solution: 1 drop(s) to each affected eye 2 times a day  Lipitor 20 mg oral tablet: 1 tab(s) orally once a day (at bedtime)  Metoprolol Succinate ER 25 mg oral tablet, extended release: 1 tab(s) orally once a day  PARICALCITOL 1 MCG CAP:   Timolol Maleate (Eqv-Timoptic) 0.5% ophthalmic solution: 1 drop(s) to each affected eye 2 times a day  Zoloft 50 mg oral tablet: 1 tab(s) orally once a day

## 2022-03-11 NOTE — DISCHARGE NOTE PROVIDER - NSDCCPCAREPLAN_GEN_ALL_CORE_FT
PRINCIPAL DISCHARGE DIAGNOSIS  Diagnosis: Shortness of breath  Assessment and Plan of Treatment: Your shortness of breath was due to a buildup of fluid inside your lungs. You got Lasix which helped you get rid of this fluids, and your kidney function improved. Please continue taking lasix at home with the other medications, and follow with Dr Mckinney as outpatient

## 2022-03-11 NOTE — PROGRESS NOTE ADULT - ASSESSMENT
86F w/ pmh of CKD 4, HFrEF s/p AICD, pulm HTN, DVT, p/w chest pain x 1 wk, complaining of chest pain for the past 1 week a/w SOB, recently       # OMERO on CKD 4 / CRS  / follows w/ Dr. Urrutia (last seen in 2020, creat ~2.5)  # ADHF / HFrEF, EF 25-30%  # HTN  # Normocytic anemia     - non oliguric  - creat improved to baseline, stable      Recommendations:  - lasix switched to PO  - UA noted, +protein, WBCs (pt is asymptomatic)  /  check urine prot/creat ratio, serum free light chain ratio, spep/hamilton  - obtain renal/bladder ultrasound with pvr  - phos level noted, no need for phos binder  - BP overall controlled  - low salt diet, limit fluid intake to 1 liter daily to avoid worsening hyponatremia     dc planning noted

## 2022-03-11 NOTE — DISCHARGE NOTE PROVIDER - HOSPITAL COURSE
86y Female with PMH of HFrEF s/p AICD, pulm HTN, DVT, CKD complaining of chest pain for the past 1 week. Patient reports having intermittent sharp left side chest pain 3/10, no aggravating or relieving factors associated with SOB. Chest pain is reproducible on palpation. Patient was recently admitted in [facility] 2 weeks ago with similar pain and mentions that her cardiac work up was negative and she was discharged home.     With a CXR consisting with volume overload and an elevated BNP, patient was diagnosed with acute decompensated heart failure, and patient was started on Lasix 40 mg IV BID.  Limited workup of ischemic disease was negative, as troponin trend was stable and no ischemic changes on serial EKGs.    Patient was followed daily, with switch to Lasix 40 mg PO daily, as patient was euvolemic and began to develop acute kidney injury.    Patient presented with OEMRO on admission, probably because of cardiorenal syndrome, corrected with diuretics.    To be noted that a repeat TTE showed an EF= 20-25%, patient is not taking any ACEi/ ARB/ spironolactone as she previously became severely hyperkaliemic. She is however on Imdur, hydralazine and Toprol XL.

## 2022-03-11 NOTE — PROGRESS NOTE ADULT - ASSESSMENT
86y Female pmh HFrEF s/p AICD, pulm HTN, DVT, CKD complaining of chest pain for the past 1 week. Patient reports having intermittent sharp left side chest pain 3/10, no aggravating or relieving factors associated with SOB. Chest pain is reproducible on palpation. Patient was recently admitted in [facility] 1 week ago with similar pain and mentions that her cardiac work up was negative and she was discharged home. She also reports having occasional lower abdominal pain and difficulty swallowing. s/p EGD and colonoscopy in 07/2020.    # atypical chest pain / bilateral opacities on CXR  - EKG noted for T-wave inversions that are secondary to the LBBB; Q waves in lead III  - elevated trops can be due to CKD, troponins are stable, patient no longer complains of chest pain  - admitted to tele  - TTE: depressed LVEF 20-25%; moderate mitral and tricuspid regurgitation; enlarged LA  - c/w home dose of hydralazine, nitrates and toprol; no ACEi/ARB/Entresto  - atorvastatin back to home dose 20 mg qday  - on aspirin, eliquis; no Plavix  - As for the volume overload, Patient is now switched to Lasix 40 mg PO qday.    # HTN  - BP noted  - Home meds: toprol, hydralazine and lasix  - c/w toprol and hydralazine  - monitor BP, will see if we should add ACEi/ARB    # elevated serum creatinine: baseline creatinine around 2.6-2.8  - OMERO on CKD vs CKD 4 progression  - follows with Dr. Urrutia as outpt  - strict i/o  - monitor serum creatinine and electrolytes; creatinine= 3.2 today, we switched her to lasix 40 mg PO; might be from diuresis      # Dysphagia / Anemia  - s/p EGD and colonoscopy in 2020 with non-specific gastritis and Diverticulosis noted  - Anemia is probably due to CKD      NO ACE inhibitors/ ARB/ Entresto/ spironolactone since patient can become severely hyperkalemic     DVT: Eliquis  GI: pantoprazole  Diet: Renal diet  Activity: Increase as tolerated  Dispo: Acute for now

## 2022-03-11 NOTE — DISCHARGE NOTE PROVIDER - NSDCHHPTASSISTHOME_GEN_ALL_CORE
[facility] Emergency Department  [address] [facility] [address]  Phone: [phone]    Name:  Melania Streeter  Current Date: 2017  : 1964  MRN: [id number]   PAT: [id number]    Visit Date: 2017  Address: [facility] [address] [facility] [address]  Phone: [phone]    Primary Care Provider     Name: HELENA Devi    Phone: [phone]    The staff of [facility] would like to thank you for allowing us to assist you with your healthcare needs. The following includes patient education materials and information on how best to care for your illness/injury at home and when to see a physician. If you need to locate a Doctor or clinic close to you, please call the Doctor Referral Service at [phone]. The Service is available Monday through Thursday from 8 AM to 8 PM and  from 8 AM to 4 PM.    Patients Please Note: If further time off is required, or a medical clearance to return to work is required, it must be obtained through your primary physician.  Return to work clearances and extensions of \"Time-Off\" will not be given by the Emergency Department.     We hope that you leave our Emergency Department believing that we provided you with very good care.   Your Opinion Matters To Us  If you receive a patient satisfaction survey in the mail, please complete and return it in the postage-paid envelope.  We truly value and appreciate your feedback.  Emergency Department Care Providers   Physician:Anny Gomes DO    Advanced Practice Provider:  No providers found     RN_________________ ED Tech__________________ Clerical_________________         Patient Needs Assistance to Leave Residence...

## 2022-03-11 NOTE — DISCHARGE NOTE PROVIDER - NSDCFUSCHEDAPPT_GEN_ALL_CORE_FT
RACHEL SUMMERS ; 06/09/2022 ; NPP Surg Vasc [address] RACHEL SUMMERS ; 06/09/2022 ; NPP Surg Vasc [address]  RACHEL SUMMERS ; 06/28/2022 ; NPP Cardio [address]

## 2022-03-11 NOTE — DISCHARGE NOTE PROVIDER - CARE PROVIDERS DIRECT ADDRESSES
,[email or web address]

## 2022-03-11 NOTE — DISCHARGE NOTE PROVIDER - ATTENDING DISCHARGE PHYSICAL EXAMINATION:
Patient seen and examined. Pertinent labs, imaging and telemetry reviewed. I agree with the above:    Patient feels well. Denies CP, SOB, palpitations.  Cr stable 2.9, near baseline.     RRR. S1S2 present. No murmurs.  CTA B/L.   Ext WWP without pitting edema B/L.     Patient is stable for discharge home with outpatient follow up with Dr. Mckinney.

## 2022-03-12 LAB
ALBUMIN SERPL ELPH-MCNC: 3.4 G/DL — LOW (ref 3.5–5.2)
ALP SERPL-CCNC: 127 U/L — HIGH (ref 30–115)
ALT FLD-CCNC: 24 U/L — SIGNIFICANT CHANGE UP (ref 0–41)
ANION GAP SERPL CALC-SCNC: 12 MMOL/L — SIGNIFICANT CHANGE UP (ref 7–14)
AST SERPL-CCNC: 34 U/L — SIGNIFICANT CHANGE UP (ref 0–41)
BASOPHILS # BLD AUTO: 0.04 K/UL — SIGNIFICANT CHANGE UP (ref 0–0.2)
BASOPHILS NFR BLD AUTO: 0.4 % — SIGNIFICANT CHANGE UP (ref 0–1)
BILIRUB SERPL-MCNC: 0.2 MG/DL — SIGNIFICANT CHANGE UP (ref 0.2–1.2)
BUN SERPL-MCNC: 50 MG/DL — HIGH (ref 10–20)
CALCIUM SERPL-MCNC: 7.7 MG/DL — LOW (ref 8.5–10.1)
CHLORIDE SERPL-SCNC: 97 MMOL/L — LOW (ref 98–110)
CO2 SERPL-SCNC: 24 MMOL/L — SIGNIFICANT CHANGE UP (ref 17–32)
CREAT SERPL-MCNC: 3.5 MG/DL — HIGH (ref 0.7–1.5)
EGFR: 12 ML/MIN/1.73M2 — LOW
EOSINOPHIL # BLD AUTO: 0.33 K/UL — SIGNIFICANT CHANGE UP (ref 0–0.7)
EOSINOPHIL NFR BLD AUTO: 3.4 % — SIGNIFICANT CHANGE UP (ref 0–8)
GLUCOSE SERPL-MCNC: 115 MG/DL — HIGH (ref 70–99)
HCT VFR BLD CALC: 28.1 % — LOW (ref 37–47)
HGB BLD-MCNC: 8.6 G/DL — LOW (ref 12–16)
IMM GRANULOCYTES NFR BLD AUTO: 0.9 % — HIGH (ref 0.1–0.3)
LYMPHOCYTES # BLD AUTO: 1.71 K/UL — SIGNIFICANT CHANGE UP (ref 1.2–3.4)
LYMPHOCYTES # BLD AUTO: 17.7 % — LOW (ref 20.5–51.1)
MAGNESIUM SERPL-MCNC: 2.5 MG/DL — HIGH (ref 1.8–2.4)
MCHC RBC-ENTMCNC: 28.6 PG — SIGNIFICANT CHANGE UP (ref 27–31)
MCHC RBC-ENTMCNC: 30.6 G/DL — LOW (ref 32–37)
MCV RBC AUTO: 93.4 FL — SIGNIFICANT CHANGE UP (ref 81–99)
MONOCYTES # BLD AUTO: 1.15 K/UL — HIGH (ref 0.1–0.6)
MONOCYTES NFR BLD AUTO: 11.9 % — HIGH (ref 1.7–9.3)
NEUTROPHILS # BLD AUTO: 6.35 K/UL — SIGNIFICANT CHANGE UP (ref 1.4–6.5)
NEUTROPHILS NFR BLD AUTO: 65.7 % — SIGNIFICANT CHANGE UP (ref 42.2–75.2)
NRBC # BLD: 0 /100 WBCS — SIGNIFICANT CHANGE UP (ref 0–0)
PLATELET # BLD AUTO: 320 K/UL — SIGNIFICANT CHANGE UP (ref 130–400)
POTASSIUM SERPL-MCNC: 5.3 MMOL/L — HIGH (ref 3.5–5)
POTASSIUM SERPL-SCNC: 5.3 MMOL/L — HIGH (ref 3.5–5)
PROT SERPL-MCNC: 6 G/DL — SIGNIFICANT CHANGE UP (ref 6–8)
RBC # BLD: 3.01 M/UL — LOW (ref 4.2–5.4)
RBC # FLD: 15.2 % — HIGH (ref 11.5–14.5)
SODIUM SERPL-SCNC: 133 MMOL/L — LOW (ref 135–146)
WBC # BLD: 9.67 K/UL — SIGNIFICANT CHANGE UP (ref 4.8–10.8)
WBC # FLD AUTO: 9.67 K/UL — SIGNIFICANT CHANGE UP (ref 4.8–10.8)

## 2022-03-12 PROCEDURE — 99232 SBSQ HOSP IP/OBS MODERATE 35: CPT

## 2022-03-12 RX ORDER — SODIUM ZIRCONIUM CYCLOSILICATE 10 G/10G
5 POWDER, FOR SUSPENSION ORAL
Refills: 0 | Status: DISCONTINUED | OUTPATIENT
Start: 2022-03-12 | End: 2022-03-14

## 2022-03-12 RX ORDER — HYDROMORPHONE HYDROCHLORIDE 2 MG/ML
0.5 INJECTION INTRAMUSCULAR; INTRAVENOUS; SUBCUTANEOUS ONCE
Refills: 0 | Status: DISCONTINUED | OUTPATIENT
Start: 2022-03-12 | End: 2022-03-12

## 2022-03-12 RX ORDER — SERTRALINE 25 MG/1
1 TABLET, FILM COATED ORAL
Qty: 0 | Refills: 0 | DISCHARGE
Start: 2022-03-12

## 2022-03-12 RX ORDER — METOPROLOL TARTRATE 50 MG
1 TABLET ORAL
Qty: 0 | Refills: 0 | DISCHARGE
Start: 2022-03-12

## 2022-03-12 RX ORDER — GUAIFENESIN/DEXTROMETHORPHAN 600MG-30MG
10 TABLET, EXTENDED RELEASE 12 HR ORAL EVERY 6 HOURS
Refills: 0 | Status: DISCONTINUED | OUTPATIENT
Start: 2022-03-12 | End: 2022-03-14

## 2022-03-12 RX ORDER — TIMOLOL 0.5 %
1 DROPS OPHTHALMIC (EYE)
Qty: 0 | Refills: 0 | DISCHARGE
Start: 2022-03-12

## 2022-03-12 RX ORDER — ATORVASTATIN CALCIUM 80 MG/1
1 TABLET, FILM COATED ORAL
Qty: 0 | Refills: 0 | DISCHARGE
Start: 2022-03-12

## 2022-03-12 RX ORDER — METOPROLOL TARTRATE 50 MG
1 TABLET ORAL
Qty: 0 | Refills: 0 | DISCHARGE

## 2022-03-12 RX ORDER — SERTRALINE 25 MG/1
1 TABLET, FILM COATED ORAL
Qty: 0 | Refills: 0 | DISCHARGE

## 2022-03-12 RX ORDER — TIMOLOL 0.5 %
1 DROPS OPHTHALMIC (EYE)
Qty: 0 | Refills: 0 | DISCHARGE

## 2022-03-12 RX ADMIN — APIXABAN 2.5 MILLIGRAM(S): 2.5 TABLET, FILM COATED ORAL at 17:22

## 2022-03-12 RX ADMIN — APIXABAN 2.5 MILLIGRAM(S): 2.5 TABLET, FILM COATED ORAL at 05:25

## 2022-03-12 RX ADMIN — Medication 81 MILLIGRAM(S): at 12:00

## 2022-03-12 RX ADMIN — Medication 25 MILLIGRAM(S): at 05:26

## 2022-03-12 RX ADMIN — Medication 40 MILLIGRAM(S): at 05:26

## 2022-03-12 RX ADMIN — LATANOPROST 1 DROP(S): 0.05 SOLUTION/ DROPS OPHTHALMIC; TOPICAL at 21:29

## 2022-03-12 RX ADMIN — Medication 50 MILLIGRAM(S): at 13:35

## 2022-03-12 RX ADMIN — ISOSORBIDE MONONITRATE 30 MILLIGRAM(S): 60 TABLET, EXTENDED RELEASE ORAL at 12:00

## 2022-03-12 RX ADMIN — Medication 1 DROP(S): at 17:23

## 2022-03-12 RX ADMIN — Medication 1 DROP(S): at 05:24

## 2022-03-12 RX ADMIN — PANTOPRAZOLE SODIUM 40 MILLIGRAM(S): 20 TABLET, DELAYED RELEASE ORAL at 06:39

## 2022-03-12 RX ADMIN — HYDROMORPHONE HYDROCHLORIDE 0.5 MILLIGRAM(S): 2 INJECTION INTRAMUSCULAR; INTRAVENOUS; SUBCUTANEOUS at 04:24

## 2022-03-12 RX ADMIN — Medication 50 MILLIGRAM(S): at 21:29

## 2022-03-12 RX ADMIN — Medication 50 MILLIGRAM(S): at 05:25

## 2022-03-12 RX ADMIN — ATORVASTATIN CALCIUM 20 MILLIGRAM(S): 80 TABLET, FILM COATED ORAL at 21:29

## 2022-03-12 RX ADMIN — SERTRALINE 50 MILLIGRAM(S): 25 TABLET, FILM COATED ORAL at 11:59

## 2022-03-12 RX ADMIN — SODIUM ZIRCONIUM CYCLOSILICATE 5 GRAM(S): 10 POWDER, FOR SUSPENSION ORAL at 17:22

## 2022-03-12 NOTE — PROGRESS NOTE ADULT - ASSESSMENT
86F w/ pmh of CKD 4, HFrEF s/p AICD, pulm HTN, DVT, p/w chest pain x 1 wk, complaining of chest pain for the past 1 week a/w SOB, recently     # OMERO on CKD 4 / CRS  / follows w/ Dr. Urrutia (last seen in 2020, creat ~2.5)  # ADHF / HFrEF, EF 25-30%  # HTN  # Normocytic anemia   - non oliguric  - creat noted / trending  up   - continue lasix   - if repeated k > 5.5 start lokelma   - UA noted, +protein, WBCs (pt is asymptomatic)  /  check urine prot/creat ratio, serum free light chain ratio, spep/hamilton  - obtain renal/bladder ultrasound with pvr  -  last phos level noted, no need for phos binder  - BP / if remains on the low side, decrease hydralazine to 25  - low salt diet, limit fluid intake to 1 liter daily to avoid worsening hyponatremia   do not d/c patient   will follow

## 2022-03-12 NOTE — PROGRESS NOTE ADULT - ASSESSMENT
86y Female pmh HFrEF s/p AICD, pulm HTN, DVT, CKD complaining of chest pain for the past 1 week. Patient reports having intermittent sharp left side chest pain 3/10, no aggravating or relieving factors associated with SOB. Chest pain is reproducible on palpation. Patient was recently admitted in [facility] 1 week ago with similar pain and mentions that her cardiac work up was negative and she was discharged home. She also reports having occasional lower abdominal pain and difficulty swallowing. s/p EGD and colonoscopy in 07/2020.    # atypical chest pain / bilateral opacities on CXR  - EKG noted for T-wave inversions that are secondary to the LBBB; Q waves in lead III  - elevated trops can be due to CKD, troponins are stable, patient no longer complains of chest pain  - admitted to tele  - TTE: depressed LVEF 20-25%; moderate mitral and tricuspid regurgitation; enlarged LA  - c/w home dose of hydralazine, nitrates and toprol; no ACEi/ARB/Entresto  - atorvastatin back to home dose 20 mg qday  - on aspirin, eliquis; no Plavix  - As for the volume overload, Patient is now switched to Lasix 40 mg PO qday; creatinine is still increasing; kept her on this dose with fluids intake encouragement    # HTN  - BP noted  - Home meds: toprol, hydralazine and lasix  - c/w toprol and hydralazine  - monitor BP, will see if we should add ACEi/ARB    # elevated serum creatinine: baseline creatinine around 2.6-2.8  - OMERO on CKD vs CKD 4 progression  - follows with Dr. Urrutia as outpt  - strict i/o  - monitor serum creatinine and electrolytes; creatinine= 3.5 today, we switched her to lasix 40 mg PO; might be from diuresis; will continue same dose of Lasix and trend daily creatinine and electrolytes      # Dysphagia / Anemia  - s/p EGD and colonoscopy in 2020 with non-specific gastritis and Diverticulosis noted  - Anemia is probably due to CKD      NO ACE inhibitors/ ARB/ Entresto/ spironolactone since patient can become severely hyperkalemic     DVT: Eliquis  GI: pantoprazole  Diet: Renal diet  Activity: Increase as tolerated  Dispo: Acute for now

## 2022-03-13 LAB
ALBUMIN SERPL ELPH-MCNC: 3.2 G/DL — LOW (ref 3.5–5.2)
ALP SERPL-CCNC: 116 U/L — HIGH (ref 30–115)
ALT FLD-CCNC: 19 U/L — SIGNIFICANT CHANGE UP (ref 0–41)
ANION GAP SERPL CALC-SCNC: 13 MMOL/L — SIGNIFICANT CHANGE UP (ref 7–14)
AST SERPL-CCNC: 27 U/L — SIGNIFICANT CHANGE UP (ref 0–41)
BILIRUB SERPL-MCNC: 0.2 MG/DL — SIGNIFICANT CHANGE UP (ref 0.2–1.2)
BUN SERPL-MCNC: 47 MG/DL — HIGH (ref 10–20)
CALCIUM SERPL-MCNC: 7.8 MG/DL — LOW (ref 8.5–10.1)
CHLORIDE SERPL-SCNC: 96 MMOL/L — LOW (ref 98–110)
CO2 SERPL-SCNC: 21 MMOL/L — SIGNIFICANT CHANGE UP (ref 17–32)
CREAT SERPL-MCNC: 3.1 MG/DL — HIGH (ref 0.7–1.5)
EGFR: 14 ML/MIN/1.73M2 — LOW
GLUCOSE SERPL-MCNC: 92 MG/DL — SIGNIFICANT CHANGE UP (ref 70–99)
HCT VFR BLD CALC: 26.8 % — LOW (ref 37–47)
HGB BLD-MCNC: 8.4 G/DL — LOW (ref 12–16)
MAGNESIUM SERPL-MCNC: 2.5 MG/DL — HIGH (ref 1.8–2.4)
MCHC RBC-ENTMCNC: 29.3 PG — SIGNIFICANT CHANGE UP (ref 27–31)
MCHC RBC-ENTMCNC: 31.3 G/DL — LOW (ref 32–37)
MCV RBC AUTO: 93.4 FL — SIGNIFICANT CHANGE UP (ref 81–99)
NRBC # BLD: 0 /100 WBCS — SIGNIFICANT CHANGE UP (ref 0–0)
PLATELET # BLD AUTO: 327 K/UL — SIGNIFICANT CHANGE UP (ref 130–400)
POTASSIUM SERPL-MCNC: 4.9 MMOL/L — SIGNIFICANT CHANGE UP (ref 3.5–5)
POTASSIUM SERPL-SCNC: 4.9 MMOL/L — SIGNIFICANT CHANGE UP (ref 3.5–5)
PROT SERPL-MCNC: 5.9 G/DL — LOW (ref 6–8)
RBC # BLD: 2.87 M/UL — LOW (ref 4.2–5.4)
RBC # FLD: 15.3 % — HIGH (ref 11.5–14.5)
SARS-COV-2 RNA SPEC QL NAA+PROBE: SIGNIFICANT CHANGE UP
SODIUM SERPL-SCNC: 130 MMOL/L — LOW (ref 135–146)
TROPONIN T SERPL-MCNC: 0.02 NG/ML — HIGH
WBC # BLD: 8.39 K/UL — SIGNIFICANT CHANGE UP (ref 4.8–10.8)
WBC # FLD AUTO: 8.39 K/UL — SIGNIFICANT CHANGE UP (ref 4.8–10.8)

## 2022-03-13 PROCEDURE — 99232 SBSQ HOSP IP/OBS MODERATE 35: CPT

## 2022-03-13 RX ADMIN — Medication 1 DROP(S): at 06:03

## 2022-03-13 RX ADMIN — PANTOPRAZOLE SODIUM 40 MILLIGRAM(S): 20 TABLET, DELAYED RELEASE ORAL at 06:04

## 2022-03-13 RX ADMIN — APIXABAN 2.5 MILLIGRAM(S): 2.5 TABLET, FILM COATED ORAL at 06:04

## 2022-03-13 RX ADMIN — Medication 50 MILLIGRAM(S): at 06:03

## 2022-03-13 RX ADMIN — APIXABAN 2.5 MILLIGRAM(S): 2.5 TABLET, FILM COATED ORAL at 17:02

## 2022-03-13 RX ADMIN — SERTRALINE 50 MILLIGRAM(S): 25 TABLET, FILM COATED ORAL at 11:02

## 2022-03-13 RX ADMIN — SODIUM ZIRCONIUM CYCLOSILICATE 5 GRAM(S): 10 POWDER, FOR SUSPENSION ORAL at 06:04

## 2022-03-13 RX ADMIN — Medication 25 MILLIGRAM(S): at 06:03

## 2022-03-13 RX ADMIN — Medication 1 DROP(S): at 17:02

## 2022-03-13 RX ADMIN — Medication 81 MILLIGRAM(S): at 11:02

## 2022-03-13 RX ADMIN — LATANOPROST 1 DROP(S): 0.05 SOLUTION/ DROPS OPHTHALMIC; TOPICAL at 21:57

## 2022-03-13 RX ADMIN — ATORVASTATIN CALCIUM 20 MILLIGRAM(S): 80 TABLET, FILM COATED ORAL at 21:57

## 2022-03-13 RX ADMIN — Medication 50 MILLIGRAM(S): at 21:57

## 2022-03-13 RX ADMIN — Medication 50 MILLIGRAM(S): at 15:00

## 2022-03-13 RX ADMIN — SODIUM ZIRCONIUM CYCLOSILICATE 5 GRAM(S): 10 POWDER, FOR SUSPENSION ORAL at 17:03

## 2022-03-13 RX ADMIN — Medication 40 MILLIGRAM(S): at 06:04

## 2022-03-13 RX ADMIN — ISOSORBIDE MONONITRATE 30 MILLIGRAM(S): 60 TABLET, EXTENDED RELEASE ORAL at 11:02

## 2022-03-13 NOTE — PROGRESS NOTE ADULT - ASSESSMENT
86F w/ pmh of CKD 4, HFrEF s/p AICD, pulm HTN, DVT, p/w chest pain x 1 wk, complaining of chest pain for the past 1 week a/w SOB, recently     # OMERO on CKD 4 / CRS  / follows w/ Dr. Urrutia (last seen in 2020, creat ~2.5)  # ADHF / HFrEF, EF 25-30%  # HTN  # Normocytic anemia   - non oliguric  - creat down-trending  - continue lasix  - restrict fluid intake to <liter daily    - UA noted, +protein, WBCs (pt is asymptomatic)  /  check urine prot/creat ratio, serum free light chain ratio, spep/hamilton  - obtain renal/bladder ultrasound with pvr  - last phos level noted, no need for phos binder  - BP too low d/t pt's age high risk for falling / decrease hydralazine to 25  - low salt diet  - d/c lokelma if K level remains < 5.3  will follow

## 2022-03-13 NOTE — PROGRESS NOTE ADULT - REASON FOR ADMISSION
Shortness of breath
SOB
Shortness of breath
Shortness of breath and chest pain

## 2022-03-13 NOTE — PROGRESS NOTE ADULT - ATTENDING COMMENTS
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   -mildly positive troponin likely related to HF  -CKD stage 4-5 , creat 3 chronic  -chronic anemia  -DVT    3/7 patient respond to IV diuretics, hemodynamically stable, feels better  Plan:  -continue lasix 40 mg IV q 12hrs for now  -monitor I/O and monitor electrolytes and kidney function  -follow up iron level and ferritin  -continue statin  -continue eliquis  -continue home medication including HCTZ, nitrate and metoprolol   -CRTD/AICD interrogation, follow up EP    3/8  patient feels better, negative balance, will continue IV lasix today ,   ICD interrogated appreciated EP eval  monitor I/O electrolytes  3/9 CXR improved but still congested  will continue IV lasix  for today, negative balance  continue current medication.
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   -mildly positive troponin likely related to HF  -CKD stage 4-5 , creat 3 chronic  -chronic anemia  -DVT    3/7 patient respond to IV diuretics, hemodynamically stable, feels better  Plan:  -continue lasix 40 mg IV q 12hrs for now  -monitor I/O and monitor electrolytes and kidney function  -follow up iron level and ferritin  -continue statin  -continue eliquis  -continue home medication including HCTZ, nitrate and metoprolol   -CRTD/AICD interrogation, follow up EP    3/8  patient feels better, negative balance, will continue IV lasix today ,   ICD interrogated appreciated EP eval  monitor I/O electrolytes  3/9 CXR improved but still congested  will continue IV lasix  for today, negative balance  continue current medication.  3/10 clinically improved, will switch to po lasix this afternoon   not on ACE or ARB for hyperkalemia issues in the past .  3/11 stable, euvolemic on physical exam , weight decrease ~6 pounds since presentation  will switch to po lasix, monitor kidney function and electrolytes   plan to dc home in 24-48 hrs if stable.
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   -mildly positive troponin likely related to HF  -CKD stage 4-5 , creat 3 chronic  -chronic anemia  -DVT    3/7 patient respond to IV diuretics, hemodynamically stable, feels better  Plan:  -continue lasix 40 mg IV q 12hrs for now  -monitor I/O and monitor electrolytes and kidney function  -follow up iron level and ferritin  -continue statin  -continue eliquis  -continue home medication including HCTZ, nitrate and metoprolol   -CRTD/AICD interrogation, follow up EP    3/8  patient feels better, negative balance, will continue IV lasix today , switch to po tomorrow  ICD interrogated appreciated EP eval  monitor I/O electrolytes  continue current medication.
cm/chf  ckd  creat on the rise   hold diuretics  encourage po fluid intake  repeat labs in am  will remain on cardiac tele
overall clinical condition stable  creat is downtrending  cont with lasix daily  encourage po intake  ambulate as tolerated  dc planning if renal fnx stable peace am
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   -mildly positive troponin likely related to HF  -CKD stage 4-5 , creat 3 chronic  -chronic anemia  -DVT    3/7 patient respond to IV diuretics, hemodynamically stable, feels better  Plan:  -continue lasix 40 mg IV q 12hrs for now  -monitor I/O and monitor electrolytes and kidney function  -follow up iron level and ferritin  -continue statin  -continue eliquis  -continue home medication including HCTZ, nitrate and metoprolol   -CRTD/AICD interrogation, follow up EP    3/8  patient feels better, negative balance, will continue IV lasix today ,   ICD interrogated appreciated EP eval  monitor I/O electrolytes  3/9 CXR improved but still congested  will continue IV lasix  for today, negative balance  continue current medication.  3/10 clinically improved, will switch to po lasix this afternoon   not on ACE or ARB for hyperkalemia issues in the past .

## 2022-03-14 VITALS
DIASTOLIC BLOOD PRESSURE: 57 MMHG | HEART RATE: 68 BPM | TEMPERATURE: 97 F | SYSTOLIC BLOOD PRESSURE: 105 MMHG | RESPIRATION RATE: 18 BRPM

## 2022-03-14 LAB
ALBUMIN SERPL ELPH-MCNC: 3.2 G/DL — LOW (ref 3.5–5.2)
ALP SERPL-CCNC: 114 U/L — SIGNIFICANT CHANGE UP (ref 30–115)
ALT FLD-CCNC: 17 U/L — SIGNIFICANT CHANGE UP (ref 0–41)
ANION GAP SERPL CALC-SCNC: 11 MMOL/L — SIGNIFICANT CHANGE UP (ref 7–14)
AST SERPL-CCNC: 25 U/L — SIGNIFICANT CHANGE UP (ref 0–41)
BASOPHILS # BLD AUTO: 0.04 K/UL — SIGNIFICANT CHANGE UP (ref 0–0.2)
BASOPHILS NFR BLD AUTO: 0.5 % — SIGNIFICANT CHANGE UP (ref 0–1)
BILIRUB SERPL-MCNC: 0.2 MG/DL — SIGNIFICANT CHANGE UP (ref 0.2–1.2)
BUN SERPL-MCNC: 47 MG/DL — HIGH (ref 10–20)
CALCIUM SERPL-MCNC: 8.1 MG/DL — LOW (ref 8.5–10.1)
CHLORIDE SERPL-SCNC: 95 MMOL/L — LOW (ref 98–110)
CO2 SERPL-SCNC: 23 MMOL/L — SIGNIFICANT CHANGE UP (ref 17–32)
CREAT SERPL-MCNC: 2.9 MG/DL — HIGH (ref 0.7–1.5)
EGFR: 15 ML/MIN/1.73M2 — LOW
EOSINOPHIL # BLD AUTO: 0.4 K/UL — SIGNIFICANT CHANGE UP (ref 0–0.7)
EOSINOPHIL NFR BLD AUTO: 4.9 % — SIGNIFICANT CHANGE UP (ref 0–8)
GLUCOSE SERPL-MCNC: 84 MG/DL — SIGNIFICANT CHANGE UP (ref 70–99)
HCT VFR BLD CALC: 26.7 % — LOW (ref 37–47)
HGB BLD-MCNC: 8.4 G/DL — LOW (ref 12–16)
IMM GRANULOCYTES NFR BLD AUTO: 0.7 % — HIGH (ref 0.1–0.3)
LYMPHOCYTES # BLD AUTO: 2.75 K/UL — SIGNIFICANT CHANGE UP (ref 1.2–3.4)
LYMPHOCYTES # BLD AUTO: 33.7 % — SIGNIFICANT CHANGE UP (ref 20.5–51.1)
MAGNESIUM SERPL-MCNC: 2.4 MG/DL — SIGNIFICANT CHANGE UP (ref 1.8–2.4)
MCHC RBC-ENTMCNC: 29.1 PG — SIGNIFICANT CHANGE UP (ref 27–31)
MCHC RBC-ENTMCNC: 31.5 G/DL — LOW (ref 32–37)
MCV RBC AUTO: 92.4 FL — SIGNIFICANT CHANGE UP (ref 81–99)
MONOCYTES # BLD AUTO: 1.46 K/UL — HIGH (ref 0.1–0.6)
MONOCYTES NFR BLD AUTO: 17.9 % — HIGH (ref 1.7–9.3)
NEUTROPHILS # BLD AUTO: 3.45 K/UL — SIGNIFICANT CHANGE UP (ref 1.4–6.5)
NEUTROPHILS NFR BLD AUTO: 42.3 % — SIGNIFICANT CHANGE UP (ref 42.2–75.2)
NRBC # BLD: 0 /100 WBCS — SIGNIFICANT CHANGE UP (ref 0–0)
OSMOLALITY SERPL: 288 MOS/KG — SIGNIFICANT CHANGE UP (ref 280–301)
OSMOLALITY UR: 228 MOS/KG — SIGNIFICANT CHANGE UP (ref 50–1200)
PLATELET # BLD AUTO: 341 K/UL — SIGNIFICANT CHANGE UP (ref 130–400)
POTASSIUM SERPL-MCNC: 5.3 MMOL/L — HIGH (ref 3.5–5)
POTASSIUM SERPL-SCNC: 5.3 MMOL/L — HIGH (ref 3.5–5)
PROT SERPL-MCNC: 6 G/DL — SIGNIFICANT CHANGE UP (ref 6–8)
RBC # BLD: 2.89 M/UL — LOW (ref 4.2–5.4)
RBC # FLD: 15.2 % — HIGH (ref 11.5–14.5)
SODIUM SERPL-SCNC: 129 MMOL/L — LOW (ref 135–146)
WBC # BLD: 8.16 K/UL — SIGNIFICANT CHANGE UP (ref 4.8–10.8)
WBC # FLD AUTO: 8.16 K/UL — SIGNIFICANT CHANGE UP (ref 4.8–10.8)

## 2022-03-14 PROCEDURE — 99239 HOSP IP/OBS DSCHRG MGMT >30: CPT

## 2022-03-14 RX ORDER — FUROSEMIDE 40 MG
1 TABLET ORAL
Qty: 0 | Refills: 0 | DISCHARGE
Start: 2022-03-14

## 2022-03-14 RX ADMIN — Medication 40 MILLIGRAM(S): at 05:17

## 2022-03-14 RX ADMIN — PANTOPRAZOLE SODIUM 40 MILLIGRAM(S): 20 TABLET, DELAYED RELEASE ORAL at 06:27

## 2022-03-14 RX ADMIN — SERTRALINE 50 MILLIGRAM(S): 25 TABLET, FILM COATED ORAL at 11:40

## 2022-03-14 RX ADMIN — Medication 81 MILLIGRAM(S): at 11:41

## 2022-03-14 RX ADMIN — SODIUM ZIRCONIUM CYCLOSILICATE 5 GRAM(S): 10 POWDER, FOR SUSPENSION ORAL at 09:45

## 2022-03-14 RX ADMIN — APIXABAN 2.5 MILLIGRAM(S): 2.5 TABLET, FILM COATED ORAL at 05:15

## 2022-03-14 RX ADMIN — Medication 25 MILLIGRAM(S): at 05:14

## 2022-03-14 RX ADMIN — Medication 50 MILLIGRAM(S): at 13:22

## 2022-03-14 RX ADMIN — ISOSORBIDE MONONITRATE 30 MILLIGRAM(S): 60 TABLET, EXTENDED RELEASE ORAL at 11:42

## 2022-03-14 RX ADMIN — Medication 50 MILLIGRAM(S): at 05:16

## 2022-03-14 RX ADMIN — Medication 1 DROP(S): at 05:14

## 2022-03-14 NOTE — PROGRESS NOTE ADULT - SUBJECTIVE AND OBJECTIVE BOX
Chief complaint: Patient is a 86y old  Female who presents with a chief complaint of Shortness of breath. Patient reports having intermittent sharp left side chest pain 3/10, no aggravating or relieving factors associated with SOB. Chest pain is reproducible on palpation.     Interval history: No acute events overnight. Vitals stable, no c/o chest pain, tolerating Lasix 40 mg PO.     Past medical history is notable for:  CHEST PAIN;ELEVATED TROPONIN    Chronic kidney disease    Pacemaker    Hypertension    Gout    Diverticulitis    Diastolic heart failure    Rheumatoid arthritis    DVT, lower extremity    Shortness of breath    Artificial pacemaker    History of appendectomy    History of tonsillectomy    History of hysterectomy    CHEST PAIN        Review of systems: REVIEW OF SYSTEMS:    CONSTITUTIONAL: No weakness, fevers or chills  EYES: No visual changes; no sclera icterics, no pain or drainage  ENT:  No vertigo or throat pain   NECK: No pain or stiffness  RESPIRATORY: No cough, wheezing, hemoptysis; No shortness of breath  CARDIOVASCULAR: No chest pain or palpitations  GASTROINTESTINAL: No abdominal or epigastric pain. No nausea, vomiting, or hematemesis; No diarrhea or constipation. No melena or hematochezia.  GENITOURINARY: No dysuria, frequency or hematuria  NEUROLOGICAL: No numbness or weakness  SKIN: No itching, rashes  Psych: No anxiety or depression      Vitals:  T(F): 97.5, Max: 97.5 (03-13 @ 04:25)  HR: 68 (68 - 77)  BP: 127/60 (120/57 - 148/70)  RR: 18 (18 - 18)  SpO2: 100% (100% - 100%)    Ins & outs:     03-10 @ 07:01  -  03-11 @ 07:00  --------------------------------------------------------  IN: 640 mL / OUT: 1400 mL / NET: -760 mL    03-11 @ 07:01  -  03-12 @ 07:00  --------------------------------------------------------  IN: 935 mL / OUT: 750 mL / NET: 185 mL    03-12 @ 06:01  -  03-13 @ 07:00  --------------------------------------------------------  IN: 960 mL / OUT: 1625 mL / NET: -665 mL    03-13 @ 07:01  - 03-13 @ 13:24  --------------------------------------------------------  IN: 210 mL / OUT: 0 mL / NET: 210 mL      Weight trend: Down 2 pounds in past 24 hours      Physical exam:  General: No apparent distress  HEENT: Anicteric sclera. Moist mucous membranes.   Cardiac: Regular rate and rhythm. No murmurs, rubs, or gallops.   Vascular: Symmetric radial pulses. Dorsalis pedis pulses palpable.   Respiratory: Normal effort. Bibasilar crackles.   Abdomen: Soft, nontender. Audible bowel sounds.   Extremtiies: Warm with trace edema. No cyanosis or clubbing.   Skin: Warm and dry. No rash.   Neurologic: Grossly normal motor function.   Psychiatric: Euthymic. Oriented to person, place, and time.     Data reviewed:   TTE Echo Complete w/o Contrast w/ Doppler (03.07.22 @ 09:56)   Left ventricular ejection fraction, by visual estimation, is 25 to   30%.   2. Moderately to severely decreased global left ventricular systolic   function.   3. Spectral Doppler shows impaired relaxation pattern of left   ventricular myocardial filling (Grade I diastolic dysfunction).   4. Moderately enlarged leftatrium.   5. Normal right atrial size.   6. Mild thickening of the anterior and posterior mitral valve leaflets.   7. Moderate mitral valve regurgitation.   8. Severe mitral annular calcification.   9. Moderate tricuspid regurgitation.  10. PSAP 40.  11. Mild pulmonic valve regurgitation.    12 Lead ECG (03.06.22 @ 21:00)   Normal sinus rhythm  Left ventricular hypertrophy with QRS widening and repolarization abnormality   ( Kevon product , Romhilt-Sparks )  Abnormal ECG    Troponin T, Serum in AM (03.13.22 @ 04:30)    Troponin T, Serum: 0.02 ng/mL    Troponin T, Serum in AM (03.07.22 @ 05:41)    Troponin T, Serum: 0.03        - Labs:                        8.4    8.39  )-----------( 327      ( 13 Mar 2022 04:30 )             26.8     03-13    130<L>  |  96<L>  |  47<H>  ----------------------------<  92  4.9   |  21  |  3.1<H>    Ca    7.8<L>      13 Mar 2022 04:30  Mg     2.5     03-13    TPro  5.9<L>  /  Alb  3.2<L>  /  TBili  0.2  /  DBili  x   /  AST  27  /  ALT  19  /  AlkPhos  116<H>  03-13    LIVER FUNCTIONS - ( 13 Mar 2022 04:30 )  Alb: 3.2 g/dL / Pro: 5.9 g/dL / ALK PHOS: 116 U/L / ALT: 19 U/L / AST: 27 U/L / GGT: x             Troponin T, Serum: 0.02 ng/mL (03-13 @ 04:30)    CARDIAC MARKERS ( 13 Mar 2022 04:30 )  x     / 0.02 ng/mL / x     / x     / x          Medications:  apixaban 2.5 milliGRAM(s) Oral two times a day  aspirin  chewable 81 milliGRAM(s) Oral daily  atorvastatin 20 milliGRAM(s) Oral at bedtime  furosemide    Tablet 40 milliGRAM(s) Oral daily  hydrALAZINE 50 milliGRAM(s) Oral three times a day  isosorbide   mononitrate ER Tablet (IMDUR) 30 milliGRAM(s) Oral daily  latanoprost 0.005% Ophthalmic Solution 1 Drop(s) Both EYES at bedtime  metoprolol succinate ER 25 milliGRAM(s) Oral daily  pantoprazole    Tablet 40 milliGRAM(s) Oral before breakfast  sertraline 50 milliGRAM(s) Oral daily  sodium zirconium cyclosilicate 5 Gram(s) Oral two times a day  timolol 0.5% Solution 1 Drop(s) Both EYES two times a day      Assessment and Plan:    86y Female pmh HFrEF s/p AICD, pulm HTN, DVT, CKD complaining of chest pain for the past 1 week. Patient reports having intermittent sharp left side chest pain 3/10, no aggravating or relieving factors associated with SOB. Chest pain is reproducible on palpation. Patient was recently admitted in [facility] 1 week ago with similar pain and mentions that her cardiac work up was negative and she was discharged home. She also reports having occasional lower abdominal pain and difficulty swallowing. s/p EGD and colonoscopy in 07/2020.    # atypical chest pain / bilateral opacities on CXR  - EKG noted for T-wave inversions that are secondary to the LBBB; Q waves in lead III  - elevated trops can be due to CKD, troponins are stable, patient no longer complains of chest pain  - admitted to tele  - TTE: depressed LVEF 20-25%; moderate mitral and tricuspid regurgitation; enlarged LA  - c/w home dose of hydralazine, nitrates and toprol; no ACEi/ARB/Entresto  - atorvastatin back to home dose 20 mg qday  - on aspirin, eliquis; no Plavix  - As for the volume overload, Patient is now switched to Lasix 40 mg PO qday; creatinine downtrending to 3.1 this AM    # HTN  - BP noted  - Home meds: toprol, hydralazine and lasix  - c/w toprol and hydralazine    # elevated serum creatinine: baseline creatinine around 2.6-2.8  - OMERO on CKD vs CKD 4 progression  - follows with Dr. Urrutia as outpt  - strict i/o  - monitor serum creatinine and electrolytes; creatinine= 3.1 today    # Dysphagia / Anemia  - s/p EGD and colonoscopy in 2020 with non-specific gastritis and Diverticulosis noted  - Anemia is probably due to CKD      NO ACE inhibitors/ ARB/ Entresto/ spironolactone since patient can become severely hyperkalemic     DVT: Eliquis  GI: pantoprazole  Diet: Renal diet  Activity: Increase as tolerated  Dispo: Acute for now, discharge home tomorrow pending stable kidney function    Please call [employer] [phone] with any questions    Lila Sharma NP  Cardiology    
Nephrology progress note    Patient was seen and examined, events over the last 24 h noted .    Allergies:  Keflex (Swelling)    Hospital Medications:   MEDICATIONS  (STANDING):  apixaban 2.5 milliGRAM(s) Oral two times a day  aspirin  chewable 81 milliGRAM(s) Oral daily  atorvastatin 20 milliGRAM(s) Oral at bedtime  furosemide   Injectable 40 milliGRAM(s) IV Push every 12 hours  hydrALAZINE 25 milliGRAM(s) Oral three times a day  isosorbide   mononitrate ER Tablet (IMDUR) 30 milliGRAM(s) Oral daily  latanoprost 0.005% Ophthalmic Solution 1 Drop(s) Both EYES at bedtime  metoprolol succinate ER 25 milliGRAM(s) Oral daily  pantoprazole    Tablet 40 milliGRAM(s) Oral before breakfast  sertraline 50 milliGRAM(s) Oral daily  timolol 0.5% Solution 1 Drop(s) Both EYES two times a day        VITALS:  T(F): 97.5 (22 @ 20:04), Max: 98 (22 @ 04:45)  HR: 69 (22 @ 20:04)  BP: 152/74 (22 @ 20:04)  RR: 18 (22 @ 20:04)  SpO2: 99% (22 @ 17:35)  Wt(kg): --     @ 07:01  -   @ 07:00  --------------------------------------------------------  IN: 268 mL / OUT: 600 mL / NET: -332 mL     @ 07:01  -   @ 07:00  --------------------------------------------------------  IN: 120 mL / OUT: 475 mL / NET: -355 mL     @ 07:01  -   @ 21:30  --------------------------------------------------------  IN: 220 mL / OUT: 950 mL / NET: -730 mL          PHYSICAL EXAM:  Constitutional: NAD  HEENT: anicteric sclera, oropharynx clear, MMM  Neck: No JVD  Respiratory: CTAB, no wheezes, rales or rhonchi  Cardiovascular: S1, S2, RRR  Gastrointestinal: BS+, soft, NT/ND  Extremities: No cyanosis or clubbing. No peripheral edema  :  No carlos.   Skin: No rashes    LABS:      138  |  105  |  38<H>  ----------------------------<  81  4.6   |  21  |  2.9<H>    Ca    8.1<L>      08 Mar 2022 06:40  Phos  2.8       Mg     1.8         TPro  5.8<L>  /  Alb  3.2<L>  /  TBili  0.2  /  DBili      /  AST  30  /  ALT  34  /  AlkPhos  150<H>                            8.6    9.70  )-----------( 285      ( 08 Mar 2022 06:40 )             27.7       Urine Studies:  Urinalysis Basic - ( 06 Mar 2022 18:16 )    Color: Light Yellow / Appearance: Slightly Turbid / S.010 / pH:   Gluc:  / Ketone: Negative  / Bili: Negative / Urobili: <2 mg/dL   Blood:  / Protein: 30 mg/dL / Nitrite: Negative   Leuk Esterase: Large / RBC: 5 /HPF / WBC 54 /HPF   Sq Epi:  / Non Sq Epi: 0 /HPF / Bacteria: Negative      Sodium, Random Urine: 109.0 mmoL/L ( @ 18:16)  Creatinine, Random Urine: 53 mg/dL ( @ 18:16)    RADIOLOGY & ADDITIONAL STUDIES:  
SUBJECTIVE:    Patient is a 86y old Female who presents with a chief complaint of Shortness of breath (09 Mar 2022 10:51)    Overnight Events: Patient reports that her shortness of breath has improved, dyspnea has subsided on ambulation, no cough, no palpitations, no chest pain.  Patient is being maintained on Lasix 40 mg IV BID.    PAST MEDICAL & SURGICAL HISTORY  Chronic kidney disease    Pacemaker    Hypertension    Gout    Diverticulitis  sigmoid    Diastolic heart failure    Rheumatoid arthritis    DVT, lower extremity    Artificial pacemaker    History of appendectomy    History of tonsillectomy    History of hysterectomy      SOCIAL HISTORY:  Negative for smoking/alcohol/drug use.     ALLERGIES:  Keflex (Swelling)    MEDICATIONS:  STANDING MEDICATIONS  apixaban 2.5 milliGRAM(s) Oral two times a day  aspirin  chewable 81 milliGRAM(s) Oral daily  atorvastatin 20 milliGRAM(s) Oral at bedtime  furosemide   Injectable 40 milliGRAM(s) IV Push every 12 hours  hydrALAZINE 50 milliGRAM(s) Oral three times a day  isosorbide   mononitrate ER Tablet (IMDUR) 30 milliGRAM(s) Oral daily  latanoprost 0.005% Ophthalmic Solution 1 Drop(s) Both EYES at bedtime  metoprolol succinate ER 25 milliGRAM(s) Oral daily  pantoprazole    Tablet 40 milliGRAM(s) Oral before breakfast  sertraline 50 milliGRAM(s) Oral daily  timolol 0.5% Solution 1 Drop(s) Both EYES two times a day    PRN MEDICATIONS  acetaminophen     Tablet .. 650 milliGRAM(s) Oral every 6 hours PRN    VITALS:   T(F): 97.5, Max: 98 (03-09-22 @ 13:39)  HR: 71 (68 - 76)  BP: 146/72 (99/55 - 146/72)  RR: 18 (16 - 18)  SpO2: 99% (96% - 99%)    LABS:                        9.1    8.83  )-----------( 336      ( 10 Mar 2022 06:20 )             29.6     03-10    134<L>  |  99  |  36<H>  ----------------------------<  113<H>  4.5   |  25  |  2.8<H>    Ca    7.7<L>      10 Mar 2022 06:20  Mg     2.0     03-10    TPro  6.1  /  Alb  3.3<L>  /  TBili  <0.2  /  DBili  x   /  AST  31  /  ALT  27  /  AlkPhos  146<H>  03-10                      03-09-22 @ 07:01  -  03-10-22 @ 07:00  --------------------------------------------------------  IN: 1294 mL / OUT: 1250 mL / NET: 44 mL          IMAGING/EKG:    No imaging    PHYSICAL EXAM:  GEN: NAD, comfortable  LUNGS: Decreased breath sounds bibasilarly  HEART: RRR  ABD: soft, NT/ND, +BS  EXT: no edema, PP b/l  NEURO: AAO x3
SUBJECTIVE:    Patient is a 86y old Female who presents with a chief complaint of Shortness of breath (10 Mar 2022 09:54)    Overnight Events: No overnight events. Patient says she has less shortness of breath, no cough, no chest pain, no palpitations.  Patient is now switched to lasix 40 mg PO qday.    PAST MEDICAL & SURGICAL HISTORY  Chronic kidney disease    Pacemaker    Hypertension    Gout    Diverticulitis  sigmoid    Diastolic heart failure    Rheumatoid arthritis    DVT, lower extremity    Artificial pacemaker    History of appendectomy    History of tonsillectomy    History of hysterectomy      SOCIAL HISTORY:  Negative for smoking/alcohol/drug use.     ALLERGIES:  Keflex (Swelling)    MEDICATIONS:  STANDING MEDICATIONS  apixaban 2.5 milliGRAM(s) Oral two times a day  aspirin  chewable 81 milliGRAM(s) Oral daily  atorvastatin 20 milliGRAM(s) Oral at bedtime  furosemide    Tablet 40 milliGRAM(s) Oral daily  hydrALAZINE 50 milliGRAM(s) Oral three times a day  isosorbide   mononitrate ER Tablet (IMDUR) 30 milliGRAM(s) Oral daily  latanoprost 0.005% Ophthalmic Solution 1 Drop(s) Both EYES at bedtime  metoprolol succinate ER 25 milliGRAM(s) Oral daily  pantoprazole    Tablet 40 milliGRAM(s) Oral before breakfast  sertraline 50 milliGRAM(s) Oral daily  timolol 0.5% Solution 1 Drop(s) Both EYES two times a day    PRN MEDICATIONS  acetaminophen     Tablet .. 650 milliGRAM(s) Oral every 6 hours PRN  aluminum hydroxide/magnesium hydroxide/simethicone Suspension 30 milliLiter(s) Oral every 6 hours PRN    VITALS:   T(F): 96.8, Max: 97.8 (03-10-22 @ 19:23)  HR: 73 (70 - 73)  BP: 117/57 (112/56 - 140/70)  RR: 18 (18 - 18)  SpO2: 98% (98% - 99%)    LABS:                        8.4    8.97  )-----------( 313      ( 11 Mar 2022 05:20 )             26.0     03-11    131<L>  |  95<L>  |  43<H>  ----------------------------<  101<H>  4.6   |  25  |  3.2<H>    Ca    7.5<L>      11 Mar 2022 05:20  Mg     2.2     03-11    TPro  5.7<L>  /  Alb  3.1<L>  /  TBili  0.2  /  DBili  x   /  AST  36  /  ALT  26  /  AlkPhos  131<H>  03-11                      03-10-22 @ 07:01  -  03-11-22 @ 07:00  --------------------------------------------------------  IN: 640 mL / OUT: 1400 mL / NET: -760 mL          IMAGING/EKG:    No recent imaging    PHYSICAL EXAM:  GEN: NAD, comfortable  LUNGS: CTAB  HEART: RRR  ABD: soft, NT/ND, +BS  EXT: no edema, PP b/l  NEURO: AAO x3
SUBJECTIVE:    Patient is a 86y old Female who presents with a chief complaint of Shortness of breath and chest pain (07 Mar 2022 15:58)    Overnight Events: Patient reports improved shortness of breath, no chest pain, no cough, no palpitations.  No events on telemetry. Repeat troponins were stable, ECG unchanged, EP interrogated the device, it is functioning properly. So, it is less likely that she had a cardiac ischemic event responsible for her ADHF.  A TTE showed an EF of 20-25%.  PAST MEDICAL & SURGICAL HISTORY  Chronic kidney disease    Pacemaker    Hypertension    Gout    Diverticulitis  sigmoid    Diastolic heart failure    Rheumatoid arthritis    DVT, lower extremity    Artificial pacemaker    History of appendectomy    History of tonsillectomy    History of hysterectomy      SOCIAL HISTORY:  Negative for smoking/alcohol/drug use.     ALLERGIES:  Keflex (Swelling)    MEDICATIONS:  STANDING MEDICATIONS  apixaban 2.5 milliGRAM(s) Oral two times a day  aspirin  chewable 81 milliGRAM(s) Oral daily  atorvastatin 20 milliGRAM(s) Oral at bedtime  furosemide   Injectable 40 milliGRAM(s) IV Push every 12 hours  hydrALAZINE 25 milliGRAM(s) Oral three times a day  isosorbide   mononitrate ER Tablet (IMDUR) 30 milliGRAM(s) Oral daily  latanoprost 0.005% Ophthalmic Solution 1 Drop(s) Both EYES at bedtime  magnesium sulfate  IVPB 2 Gram(s) IV Intermittent once  metoprolol succinate ER 25 milliGRAM(s) Oral daily  pantoprazole    Tablet 40 milliGRAM(s) Oral before breakfast  sertraline 50 milliGRAM(s) Oral daily  timolol 0.5% Solution 1 Drop(s) Both EYES two times a day    PRN MEDICATIONS  acetaminophen     Tablet .. 650 milliGRAM(s) Oral every 6 hours PRN    VITALS:   T(F): 98, Max: 98 (03-07-22 @ 13:57)  HR: 67 (67 - 77)  BP: 166/73 (130/71 - 172/81)  RR: 17 (17 - 18)  SpO2: --    LABS:                        8.6    9.70  )-----------( 285      ( 08 Mar 2022 06:40 )             27.7     03-08    138  |  105  |  38<H>  ----------------------------<  81  4.6   |  21  |  2.9<H>    Ca    8.1<L>      08 Mar 2022 06:40  Phos  2.8     03-07  Mg     1.8     03-08    TPro  5.8<L>  /  Alb  3.2<L>  /  TBili  0.2  /  DBili  x   /  AST  30  /  ALT  34  /  AlkPhos  150<H>  03-08              CARDIAC MARKERS ( 07 Mar 2022 05:41 )  x     / 0.03 ng/mL / x     / x     / x      CARDIAC MARKERS ( 06 Mar 2022 20:00 )  x     / 0.02 ng/mL / x     / x     / x      CARDIAC MARKERS ( 06 Mar 2022 12:32 )  x     / 0.02 ng/mL / x     / x     / x              03-07-22 @ 07:01  -  03-08-22 @ 07:00  --------------------------------------------------------  IN: 120 mL / OUT: 475 mL / NET: -355 mL    03-08-22 @ 07:01  -  03-08-22 @ 11:02  --------------------------------------------------------  IN: 0 mL / OUT: 200 mL / NET: -200 mL          IMAGING/EKG:    No imaging    PHYSICAL EXAM:  GEN: NAD, comfortable  LUNGS: Better breath sounds heard bilaterally  HEART: RRR  ABD: soft, NT/ND, +BS  EXT: no edema  NEURO: AAO x3
seen and examined  no distress   lying  comfortable         PAST HISTORY  --------------------------------------------------------------------------------  No significant changes to PMH, PSH, FHx, SHx, unless otherwise noted    ALLERGIES & MEDICATIONS  --------------------------------------------------------------------------------  Allergies    Keflex (Swelling)    Intolerances      Standing Inpatient Medications  apixaban 2.5 milliGRAM(s) Oral two times a day  aspirin  chewable 81 milliGRAM(s) Oral daily  atorvastatin 20 milliGRAM(s) Oral at bedtime  furosemide    Tablet 40 milliGRAM(s) Oral daily  hydrALAZINE 50 milliGRAM(s) Oral three times a day  isosorbide   mononitrate ER Tablet (IMDUR) 30 milliGRAM(s) Oral daily  latanoprost 0.005% Ophthalmic Solution 1 Drop(s) Both EYES at bedtime  metoprolol succinate ER 25 milliGRAM(s) Oral daily  pantoprazole    Tablet 40 milliGRAM(s) Oral before breakfast  sertraline 50 milliGRAM(s) Oral daily  timolol 0.5% Solution 1 Drop(s) Both EYES two times a day    PRN Inpatient Medications  acetaminophen     Tablet .. 650 milliGRAM(s) Oral every 6 hours PRN  aluminum hydroxide/magnesium hydroxide/simethicone Suspension 30 milliLiter(s) Oral every 6 hours PRN          VITALS/PHYSICAL EXAM  --------------------------------------------------------------------------------  T(C): 36.1 (03-12-22 @ 04:39), Max: 36.1 (03-11-22 @ 14:07)  HR: 70 (03-12-22 @ 04:39) (66 - 74)  BP: 120/60 (03-12-22 @ 04:39) (100/53 - 133/60)  RR: 18 (03-12-22 @ 04:39) (18 - 18)  SpO2: 100% (03-12-22 @ 08:06) (96% - 100%)  Wt(kg): --        03-11-22 @ 07:01  -  03-12-22 @ 07:00  --------------------------------------------------------  IN: 935 mL / OUT: 750 mL / NET: 185 mL    03-12-22 @ 06:01  -  03-12-22 @ 08:19  --------------------------------------------------------  IN: 0 mL / OUT: 200 mL / NET: -200 mL      Physical Exam:  	Gen: NAD,  	Pulm: decrease BS  B/L  	CV:  S1S2; no rub  	Abd: distended  	LE: no edema      LABS/STUDIES  --------------------------------------------------------------------------------              8.6    9.67  >-----------<  320      [03-12-22 @ 04:30]              28.1     133  |  97  |  50  ----------------------------<  115      [03-12-22 @ 04:30]  5.3   |  24  |  3.5        Ca     7.7     [03-12-22 @ 04:30]      Mg     2.5     [03-12-22 @ 04:30]    TPro  6.0  /  Alb  3.4  /  TBili  0.2  /  DBili  x   /  AST  34  /  ALT  24  /  AlkPhos  127  [03-12-22 @ 04:30]      Creatinine Trend:  SCr 3.5 [03-12 @ 04:30]  SCr 3.2 [03-11 @ 05:20]  SCr 2.8 [03-10 @ 06:20]  SCr 2.8 [03-09 @ 06:00]  SCr 2.9 [03-08 @ 06:40]    Urinalysis - [03-06-22 @ 18:16]      Color Light Yellow / Appearance Slightly Turbid / SG 1.010 / pH 6.0      Gluc Negative / Ketone Negative  / Bili Negative / Urobili <2 mg/dL       Blood Negative / Protein 30 mg/dL / Leuk Est Large / Nitrite Negative      RBC 5 / WBC 54 / Hyaline 3 / Gran  / Sq Epi  / Non Sq Epi 0 / Bacteria Negative    Urine Creatinine 53      [03-06-22 @ 18:16]  Urine Sodium 109.0      [03-06-22 @ 18:16]  Urine Urea Nitrogen 292      [03-06-22 @ 18:16]    Ferritin 470      [03-10-22 @ 06:20]  PTH -- (Ca 8.4)      [03-07-22 @ 05:41]   32  TSH 2.22      [03-07-22 @ 05:41]  Lipid: chol 110, TG 60, HDL 35, LDL --      [03-07-22 @ 05:41]      
Nephrology Progress Note    RACHEL SUMMERS  MRN-[id number]  86y  Female    S:  Patient is seen and examined, events over the last 24h noted.    O:  Allergies:  Keflex (Swelling)    Hospital Medications:   MEDICATIONS  (STANDING):  apixaban 2.5 milliGRAM(s) Oral two times a day  aspirin  chewable 81 milliGRAM(s) Oral daily  atorvastatin 20 milliGRAM(s) Oral at bedtime  furosemide    Tablet 40 milliGRAM(s) Oral daily  hydrALAZINE 50 milliGRAM(s) Oral three times a day  isosorbide   mononitrate ER Tablet (IMDUR) 30 milliGRAM(s) Oral daily  latanoprost 0.005% Ophthalmic Solution 1 Drop(s) Both EYES at bedtime  metoprolol succinate ER 25 milliGRAM(s) Oral daily  pantoprazole    Tablet 40 milliGRAM(s) Oral before breakfast  sertraline 50 milliGRAM(s) Oral daily  sodium zirconium cyclosilicate 5 Gram(s) Oral two times a day  timolol 0.5% Solution 1 Drop(s) Both EYES two times a day    MEDICATIONS  (PRN):  acetaminophen     Tablet .. 650 milliGRAM(s) Oral every 6 hours PRN Mild Pain (1 - 3)  aluminum hydroxide/magnesium hydroxide/simethicone Suspension 30 milliLiter(s) Oral every 6 hours PRN Dyspepsia  guaifenesin/dextromethorphan Oral Liquid 10 milliLiter(s) Oral every 6 hours PRN Cough    Home Medications:  apixaban 2.5 mg oral tablet: 1 tab(s) orally 2 times a day (06 Mar 2022 18:54)  furosemide 20 mg oral tablet: 1 tab(s) orally once a day (2021 22:51)  hydrALAZINE 25 mg oral tablet: 1 tab(s) orally 3 times a day (06 Mar 2022 18:54)  isosorbide mononitrate 30 mg oral tablet, extended release: 1 tab(s) orally once a day (in the morning) (2021 22:51)  latanoprost 0.005% ophthalmic solution: 1 drop(s) to each affected eye 2 times a day (2021 22:51)  Lipitor 20 mg oral tablet: 1 tab(s) orally once a day (at bedtime) (12 Mar 2022 19:28)  Metoprolol Succinate ER 25 mg oral tablet, extended release: 1 tab(s) orally once a day (12 Mar 2022 19:28)  PARICALCITOL 1 MCG CAP:  (06 Mar 2022 18:54)  Timolol Maleate (Eqv-Timoptic) 0.5% ophthalmic solution: 1 drop(s) to each affected eye 2 times a day (12 Mar 2022 19:28)  Zoloft 50 mg oral tablet: 1 tab(s) orally once a day (12 Mar 2022 19:28)      VITALS:  Daily Weight in k (13 Mar 2022 04:25)  T(F): 97.5 (22 @ 04:25), Max: 97.5 (22 @ 04:25)  HR: 68 (22 @ 11:03)  BP: 127/60 (22 @ 11:03)  RR: 18 (22 @ 04:25)  SpO2: 100% (22 @ 04:25)  Wt(kg): --  I&O's Detail    12 Mar 2022 06:  -  13 Mar 2022 07:00  --------------------------------------------------------  IN:    Oral Fluid: 960 mL  Total IN: 960 mL    OUT:    Voided (mL): 1625 mL  Total OUT: 1625 mL    Total NET: -665 mL      13 Mar 2022 07:  -  13 Mar 2022 11:24  --------------------------------------------------------  IN:    Oral Fluid: 210 mL  Total IN: 210 mL    OUT:  Total OUT: 0 mL    Total NET: 210 mL        I&O's Summary    12 Mar 2022 06:  -  13 Mar 2022 07:00  --------------------------------------------------------  IN: 960 mL / OUT: 1625 mL / NET: -665 mL    13 Mar 2022 07:01  -  13 Mar 2022 11:24  --------------------------------------------------------  IN: 210 mL / OUT: 0 mL / NET: 210 mL          PHYSICAL EXAM:  Gen: NAD  Resp: CTAB  Card: S1/S2  Abd: soft  Extremities: no edema      LABS:          130<L>  |  96<L>  |  47<H>  ----------------------------<  92  4.9   |  21  |  3.1<H>    Ca    7.8<L>      13 Mar 2022 04:30  Mg     2.5         TPro  5.9<L>  /  Alb  3.2<L>  /  TBili  0.2  /  DBili      /  AST  27  /  ALT  19  /  AlkPhos  116<H>        Phosphorus Level, Serum: 2.8 mg/dL (22 @ 05:41)    Intact PTH: 32 pg/mL (22 @ 05:41)  Intact PTH: 65 pg/mL (18 @ 06:59)                          8.4    8.39  )-----------( 327      ( 13 Mar 2022 04:30 )             26.8     Mean Cell Volume: 93.4 fL (22 @ 04:30)    Ferritin, Serum: 470 ng/mL (03-10-22 @ 06:20)  Ferritin, Serum: 490 ng/mL (22 @ 05:41)      Urine Studies:  Urinalysis Basic - ( 06 Mar 2022 18:16 )    Color: Light Yellow / Appearance: Slightly Turbid / S.010 / pH:   Gluc:  / Ketone: Negative  / Bili: Negative / Urobili: <2 mg/dL   Blood:  / Protein: 30 mg/dL / Nitrite: Negative   Leuk Esterase: Large / RBC: 5 /HPF / WBC 54 /HPF   Sq Epi:  / Non Sq Epi: 0 /HPF / Bacteria: Negative    Urea Nitrogen,  Random Urine: 292 mg/dL (22 @ 18:16)  Sodium, Random Urine: 109.0 mmoL/L ( @ 18:16)  Creatinine, Random Urine: 53 mg/dL ( @ 18:16)    Creatinine trend:  Creatinine, Serum: 3.1 mg/dL (22 @ 04:30)  Creatinine, Serum: 3.5 mg/dL (22 @ 04:30)  Creatinine, Serum: 3.2 mg/dL (22 @ 05:20)  Creatinine, Serum: 2.8 mg/dL (03-10-22 @ 06:20)  Creatinine, Serum: 2.8 mg/dL (22 @ 06:00)  Creatinine, Serum: 2.9 mg/dL (22 @ 06:40)  Creatinine, Serum: 2.8 mg/dL (22 @ 17:10)  Creatinine, Serum: 3.1 mg/dL (22 @ 05:41)
Nephrology progress note    Patient is seen and examined in the am, events over the last 24 h noted .    Allergies:  Keflex (Swelling)    Hospital Medications:   MEDICATIONS  (STANDING):  apixaban 2.5 milliGRAM(s) Oral two times a day  aspirin  chewable 81 milliGRAM(s) Oral daily  atorvastatin 20 milliGRAM(s) Oral at bedtime  hydrALAZINE 50 milliGRAM(s) Oral three times a day  isosorbide   mononitrate ER Tablet (IMDUR) 30 milliGRAM(s) Oral daily  latanoprost 0.005% Ophthalmic Solution 1 Drop(s) Both EYES at bedtime  metoprolol succinate ER 25 milliGRAM(s) Oral daily  pantoprazole    Tablet 40 milliGRAM(s) Oral before breakfast  sertraline 50 milliGRAM(s) Oral daily  timolol 0.5% Solution 1 Drop(s) Both EYES two times a day        VITALS:  T(F): 97.8 (03-10-22 @ 19:23), Max: 97.8 (03-10-22 @ 19:23)  HR: 70 (03-10-22 @ 19:23)  BP: 112/56 (03-10-22 @ 19:23)  RR: 18 (03-10-22 @ 19:23)  SpO2: 99% (03-10-22 @ 20:04)  Wt(kg): --     @ 07:01  -   @ 07:00  --------------------------------------------------------  IN: 460 mL / OUT: 1500 mL / NET: -1040 mL     @ 07:01  -  03-10 @ 07:00  --------------------------------------------------------  IN: 1294 mL / OUT: 1250 mL / NET: 44 mL    03-10 @ 07:01  -  03-10 @ 21:06  --------------------------------------------------------  IN: 440 mL / OUT: 1400 mL / NET: -960 mL          PHYSICAL EXAM:  Constitutional: NAD  HEENT: anicteric sclera,   Neck: No JVD  Respiratory: CTA  Cardiovascular: S1, S2, RRR  Gastrointestinal: BS+, soft, NT/ND  Extremities: No peripheral edema  Neurological: A/O x 3  : No CVA tenderness. Primafit carlos.   Skin: No rashes  Vascular Access:    LABS:  03-10    134<L>  |  99  |  36<H>  ----------------------------<  113<H>  4.5   |  25  |  2.8<H>  Creatinine Trend: 2.8<--, 2.8<--, 2.9<--, 2.8<--, 3.1<--, 3.3<--    Ca    7.7<L>      10 Mar 2022 06:20  Mg     2.0     03-10    TPro  6.1  /  Alb  3.3<L>  /  TBili  <0.2  /  DBili      /  AST  31  /  ALT  27  /  AlkPhos  146<H>  03-10                          9.1    8.83  )-----------( 336      ( 10 Mar 2022 06:20 )             29.6       Urine Studies:  Urinalysis Basic - ( 06 Mar 2022 18:16 )    Color: Light Yellow / Appearance: Slightly Turbid / S.010 / pH:   Gluc:  / Ketone: Negative  / Bili: Negative / Urobili: <2 mg/dL   Blood:  / Protein: 30 mg/dL / Nitrite: Negative   Leuk Esterase: Large / RBC: 5 /HPF / WBC 54 /HPF   Sq Epi:  / Non Sq Epi: 0 /HPF / Bacteria: Negative      Sodium, Random Urine: 109.0 mmoL/L ( @ 18:16)  Creatinine, Random Urine: 53 mg/dL ( @ 18:16)    RADIOLOGY & ADDITIONAL STUDIES:  < from: Xray Chest 1 View- PORTABLE-Routine (Xray Chest 1 View- PORTABLE-Routine in AM.) (22 @ 05:53) >  Bilateral opacities and effusions.    < end of copied text >    
Nephrology progress note    Patient was seen and examined, events over the last 24 h noted .    CR stable    Allergies:  Keflex (Swelling)    Hospital Medications:   MEDICATIONS  (STANDING):  apixaban 2.5 milliGRAM(s) Oral two times a day  aspirin  chewable 81 milliGRAM(s) Oral daily  atorvastatin 20 milliGRAM(s) Oral at bedtime  furosemide    Tablet 40 milliGRAM(s) Oral daily  hydrALAZINE 50 milliGRAM(s) Oral three times a day  isosorbide   mononitrate ER Tablet (IMDUR) 30 milliGRAM(s) Oral daily  latanoprost 0.005% Ophthalmic Solution 1 Drop(s) Both EYES at bedtime  metoprolol succinate ER 25 milliGRAM(s) Oral daily  pantoprazole    Tablet 40 milliGRAM(s) Oral before breakfast  sertraline 50 milliGRAM(s) Oral daily  timolol 0.5% Solution 1 Drop(s) Both EYES two times a day        VITALS:  T(F): 96.8 (22 @ 04:17), Max: 97.8 (03-10-22 @ 19:23)  HR: 73 (22 @ 04:17)  BP: 117/57 (22 @ 04:17)  RR: 18 (03-10-22 @ 19:23)  SpO2: 98% (22 @ 00:44)  Wt(kg): --     @ :  -  03-10 @ 07:00  --------------------------------------------------------  IN: 1294 mL / OUT: 1250 mL / NET: 44 mL    03-10 @ 07:  -   @ 07:00  --------------------------------------------------------  IN: 640 mL / OUT: 1400 mL / NET: -760 mL     @ 07:01  -   @ 11:48  --------------------------------------------------------  IN: 220 mL / OUT: 0 mL / NET: 220 mL          PHYSICAL EXAM:  Constitutional: NAD  HEENT: anicteric sclera, oropharynx clear, MMM  Neck: No JVD  Respiratory: CTAB, no wheezes, rales or rhonchi  Cardiovascular: S1, S2, RRR  Gastrointestinal: BS+, soft, NT/ND  Extremities: No cyanosis or clubbing. No peripheral edema  :  No carlos.   Skin: No rashes    LABS:      131<L>  |  95<L>  |  43<H>  ----------------------------<  101<H>  4.6   |  25  |  3.2<H>    Ca    7.5<L>      11 Mar 2022 05:20  Mg     2.2         TPro  5.7<L>  /  Alb  3.1<L>  /  TBili  0.2  /  DBili      /  AST  36  /  ALT  26  /  AlkPhos  131<H>                            8.4    8.97  )-----------( 313      ( 11 Mar 2022 05:20 )             26.0       Urine Studies:  Urinalysis Basic - ( 06 Mar 2022 18:16 )    Color: Light Yellow / Appearance: Slightly Turbid / S.010 / pH:   Gluc:  / Ketone: Negative  / Bili: Negative / Urobili: <2 mg/dL   Blood:  / Protein: 30 mg/dL / Nitrite: Negative   Leuk Esterase: Large / RBC: 5 /HPF / WBC 54 /HPF   Sq Epi:  / Non Sq Epi: 0 /HPF / Bacteria: Negative      Sodium, Random Urine: 109.0 mmoL/L ( @ 18:16)  Creatinine, Random Urine: 53 mg/dL ( @ 18:16)    RADIOLOGY & ADDITIONAL STUDIES:  
SUBJECTIVE:    Patient is a 86y old Female who presents with a chief complaint of chest pain.    Overnight Events: Patient still reports shortness of breath, no cough, no palpitations. Telemetry recorded events of pace- induced tachycardia, as this arrhythmia is wide, and each QRS is preceded by the spike.  No other events, patient is hemodynamically stable during these episodes. Patient is still receiving diuretics Lasix 40 mg IV q12h.    PAST MEDICAL & SURGICAL HISTORY  Chronic kidney disease    Pacemaker    Hypertension    Gout    Diverticulitis  sigmoid    Diastolic heart failure    Rheumatoid arthritis    DVT, lower extremity    Artificial pacemaker    History of appendectomy    History of tonsillectomy    History of hysterectomy      SOCIAL HISTORY:  Negative for smoking/alcohol/drug use.     ALLERGIES:  Keflex (Swelling)    MEDICATIONS:  STANDING MEDICATIONS  apixaban 2.5 milliGRAM(s) Oral two times a day  aspirin  chewable 81 milliGRAM(s) Oral daily  atorvastatin 20 milliGRAM(s) Oral at bedtime  furosemide   Injectable 40 milliGRAM(s) IV Push every 12 hours  hydrALAZINE 25 milliGRAM(s) Oral three times a day  isosorbide   mononitrate ER Tablet (IMDUR) 30 milliGRAM(s) Oral daily  latanoprost 0.005% Ophthalmic Solution 1 Drop(s) Both EYES at bedtime  metoprolol succinate ER 25 milliGRAM(s) Oral daily  pantoprazole    Tablet 40 milliGRAM(s) Oral before breakfast  sertraline 50 milliGRAM(s) Oral daily  timolol 0.5% Solution 1 Drop(s) Both EYES two times a day    PRN MEDICATIONS  acetaminophen     Tablet .. 650 milliGRAM(s) Oral every 6 hours PRN    VITALS:   T(F): 98, Max: 98 (03-06-22 @ 17:49)  HR: 72 (71 - 80)  BP: 159/80 (131/83 - 171/81)  RR: 18 (18 - 18)  SpO2: 97% (97% - 98%)    LABS:                        7.9    9.89  )-----------( 279      ( 07 Mar 2022 05:41 )             25.2     03-07    138  |  107  |  43<H>  ----------------------------<  84  4.7   |  22  |  3.1<H>    Ca    8.4<L>      07 Mar 2022 05:41  Phos  2.8     03-07  Mg     1.8     03-07    TPro  5.4<L>  /  Alb  3.1<L>  /  TBili  0.3  /  DBili  x   /  AST  34  /  ALT  40  /  AlkPhos  154<H>  03-07          Troponin T, Serum: 0.03 ng/mL *HH* (03-07-22 @ 05:41)  Troponin T, Serum: 0.02 ng/mL *H* (03-06-22 @ 20:00)      CARDIAC MARKERS ( 07 Mar 2022 05:41 )  x     / 0.03 ng/mL / x     / x     / x      CARDIAC MARKERS ( 06 Mar 2022 20:00 )  x     / 0.02 ng/mL / x     / x     / x      CARDIAC MARKERS ( 06 Mar 2022 12:32 )  x     / 0.02 ng/mL / x     / x     / x              03-06-22 @ 07:01  -  03-07-22 @ 07:00  --------------------------------------------------------  IN: 268 mL / OUT: 600 mL / NET: -332 mL          IMAGING/EKG:    No recent imaging beside the ones done in the ED    PHYSICAL EXAM:  GEN: NAD, comfortable  LUNGS: Decreased breath sounds bilaterally, with crackles on the right lower lung field  HEART: RRR  ABD: soft, NT/ND, +BS  EXT: 1+ pitting edema  NEURO: AAO x3
LENGTH OF HOSPITAL STAY: 8d    CHIEF COMPLAINT:    Patient is a 86y old  Female who presents with a chief complaint of SOB (13 Mar 2022 13:23)    OVERNIGHT EVENTS:    No overnight events. Patient has no complaints this AM and feels ready to go home.     FOLLOW UP:    HOSPITAL COURSE:  With a CXR consisting with volume overload and an elevated BNP, patient was diagnosed with acute decompensated heart failure, and patient was started on Lasix 40 mg IV BID.  Limited workup of ischemic disease was negative, as troponin trend was stable and no ischemic changes on serial EKGs.    Patient was followed daily, with switch to Lasix 40 mg PO daily, as patient was euvolemic and began to develop acute kidney injury.    Patient presented with OMERO on admission, probably because of cardiorenal syndrome, corrected with diuretics.    To be noted that a repeat TTE showed an EF= 20-25%, patient is not taking any ACEi/ ARB/ spironolactone as she previously became severely hyperkaliemic. She is however on Imdur, hydralazine and Toprol XL    HPI:    HPI:  86y Female pmh HFrEF s/p AICD, pulm HTN, DVT, CKD complaining of chest pain for the past 1 week. Patient reports having intermittent sharp left side chest pain 3/10, no aggravating or relieving factors associated with SOB. chest pain is reproducible on palpation. Patient was recently admitted in [facility] 1 week ago with similar pain and mentions that her cardiac work up was negative and she was discharged home. She also reports having occasional lower abdominal pain and difficulty swallowing. s/p EGD and colonoscopy in 07/2020. Patient denies any fever, chills, cough, n/v/d.  In ED: Temp = 98; HR = 86; BP = 171/108; RR = 18; SaO2 = 96% on room air. Work up noted for EKG: t-wave inversions on leads 2,3 AVF and V4-V6; trops 0.02, hgb 8.7, creatinine 3.3, CXR: right paratracheal and perihilar opacity + bibasilar opacities.  Patient received Aspirin 325 mg x 1. (06 Mar 2022 17:39)      ALLERGIES:    Allergies    Keflex (Swelling)    Intolerances        PMHx:    PAST MEDICAL & SURGICAL HISTORY:  Chronic kidney disease    Pacemaker    Hypertension    Gout    Diverticulitis  sigmoid    Diastolic heart failure    Rheumatoid arthritis    DVT, lower extremity    Artificial pacemaker    History of appendectomy    History of tonsillectomy    History of hysterectomy      PHYSICAL EXAM:  Gen: NAD, resting in bed  HEENT: Normocephalic, atraumatic  Neck: supple, no lymphadenopathy  CV: Regular rate & regular rhythm  Lungs: CTAB  Abdomen: Soft, BS present  Ext: Warm, well perfused  Neuro: non focal, awake  Skin: no rash, no erythema    MEDICATIONS:  STANDING MEDICATIONS  apixaban 2.5 milliGRAM(s) Oral two times a day  aspirin  chewable 81 milliGRAM(s) Oral daily  atorvastatin 20 milliGRAM(s) Oral at bedtime  furosemide    Tablet 40 milliGRAM(s) Oral daily  hydrALAZINE 50 milliGRAM(s) Oral three times a day  isosorbide   mononitrate ER Tablet (IMDUR) 30 milliGRAM(s) Oral daily  latanoprost 0.005% Ophthalmic Solution 1 Drop(s) Both EYES at bedtime  metoprolol succinate ER 25 milliGRAM(s) Oral daily  pantoprazole    Tablet 40 milliGRAM(s) Oral before breakfast  sertraline 50 milliGRAM(s) Oral daily  sodium zirconium cyclosilicate 5 Gram(s) Oral two times a day  timolol 0.5% Solution 1 Drop(s) Both EYES two times a day    PRN MEDICATIONS  acetaminophen     Tablet .. 650 milliGRAM(s) Oral every 6 hours PRN  aluminum hydroxide/magnesium hydroxide/simethicone Suspension 30 milliLiter(s) Oral every 6 hours PRN  guaifenesin/dextromethorphan Oral Liquid 10 milliLiter(s) Oral every 6 hours PRN      LABS:                        8.4    8.16  )-----------( 341      ( 14 Mar 2022 04:30 )             26.7     03-14    129<L>  |  95<L>  |  47<H>  ----------------------------<  84  5.3<H>   |  23  |  2.9<H>    Ca    8.1<L>      14 Mar 2022 04:30  Mg     2.4     03-14    TPro  6.0  /  Alb  3.2<L>  /  TBili  0.2  /  DBili  x   /  AST  25  /  ALT  17  /  AlkPhos  114  03-14              CARDIAC MARKERS ( 13 Mar 2022 04:30 )  x     / 0.02 ng/mL / x     / x     / x          RADIOLOGY:    VITALS:   T(F): 96.6  HR: 65  BP: 123/56  RR: 18  SpO2: 100%    
Nephrology Progress Note    RACHEL SUMMERS  MRN-[id number]  86y  Female    S:  Patient is seen and examined, events over the last 24h noted.    O:  Allergies:  Keflex (Swelling)    Hospital Medications:   MEDICATIONS  (STANDING):  apixaban 2.5 milliGRAM(s) Oral two times a day  aspirin  chewable 81 milliGRAM(s) Oral daily  atorvastatin 20 milliGRAM(s) Oral at bedtime  furosemide   Injectable 40 milliGRAM(s) IV Push every 12 hours  hydrALAZINE 50 milliGRAM(s) Oral three times a day  isosorbide   mononitrate ER Tablet (IMDUR) 30 milliGRAM(s) Oral daily  latanoprost 0.005% Ophthalmic Solution 1 Drop(s) Both EYES at bedtime  metoprolol succinate ER 25 milliGRAM(s) Oral daily  pantoprazole    Tablet 40 milliGRAM(s) Oral before breakfast  sertraline 50 milliGRAM(s) Oral daily  timolol 0.5% Solution 1 Drop(s) Both EYES two times a day    MEDICATIONS  (PRN):  acetaminophen     Tablet .. 650 milliGRAM(s) Oral every 6 hours PRN Mild Pain (1 - 3)    Home Medications:  acetaminophen 325 mg oral tablet: 2 tab(s) orally every 6 hours (06 Mar 2022 18:54)  apixaban 2.5 mg oral tablet: 1 tab(s) orally 2 times a day (06 Mar 2022 18:54)  furosemide 20 mg oral tablet: 1 tab(s) orally once a day (2021 22:51)  hydrALAZINE 25 mg oral tablet: 1 tab(s) orally 3 times a day (06 Mar 2022 18:54)  isosorbide mononitrate 30 mg oral tablet, extended release: 1 tab(s) orally once a day (in the morning) (2021 22:51)  latanoprost 0.005% ophthalmic solution: 1 drop(s) to each affected eye 2 times a day (2021 22:51)  Metoprolol Succinate ER 25 mg oral tablet, extended release: 1 tab(s) orally once a day (2021 22:51)  PARICALCITOL 1 MCG CAP:  (06 Mar 2022 18:54)  sertraline 50 mg oral tablet: 1 tab(s) orally once a day (2021 22:51)  Timolol Maleate (Eqv-Timoptic) 0.5% ophthalmic solution: 1 drop(s) to each affected eye 2 times a day (2021 22:51)      VITALS:  Daily Weight in k.7 (09 Mar 2022 04:33)  T(F): 98 (22 @ 04:33), Max: 98 (22 @ 04:33)  HR: 70 (22 @ 04:33)  BP: 162/78 (22 @ 04:33)  RR: 18 (22 @ 04:33)  SpO2: 100% (22 @ 01:50)  Wt(kg): --  I&O's Detail    08 Mar 2022 07:  -  09 Mar 2022 07:00  --------------------------------------------------------  IN:    Oral Fluid: 460 mL  Total IN: 460 mL    OUT:    Voided (mL): 1500 mL  Total OUT: 1500 mL    Total NET: -1040 mL      09 Mar 2022 07:  -  09 Mar 2022 12:27  --------------------------------------------------------  IN:    Oral Fluid: 240 mL  Total IN: 240 mL    OUT:  Total OUT: 0 mL    Total NET: 240 mL        I&O's Summary    08 Mar 2022 07:01  -  09 Mar 2022 07:00  --------------------------------------------------------  IN: 460 mL / OUT: 1500 mL / NET: -1040 mL    09 Mar 2022 07:  -  09 Mar 2022 12:27  --------------------------------------------------------  IN: 240 mL / OUT: 0 mL / NET: 240 mL          PHYSICAL EXAM:  Gen: NAD on nc  Resp: b/l rales  Card: S1/S2  Abd: soft  Extremities: no edema       LABS:          134<L>  |  98  |  34<H>  ----------------------------<  122<H>  4.1   |  23  |  2.8<H>    Ca    7.7<L>      09 Mar 2022 06:00  Mg     2.3         TPro  6.1  /  Alb  3.4<L>  /  TBili  <0.2  /  DBili      /  AST  27  /  ALT  29  /  AlkPhos  150<H>      eGFR if Non African American: 15 mL/min/1.73M2 (21 @ 11:59)  eGFR if : 18 mL/min/1.73M2 (21 @ 11:59)    Phosphorus Level, Serum: 2.8 mg/dL (22 @ 05:41)    Intact PTH: 32 pg/mL (22 @ 05:41)                          9.0    8.89  )-----------( 285      ( 09 Mar 2022 06:00 )             29.0     Mean Cell Volume: 93.5 fL (22 @ 06:00)    Ferritin, Serum: 490 ng/mL (22 @ 05:41)  % Saturation, Iron: 19 % (20 @ 21:14)      Urine Studies:  Urinalysis Basic - ( 06 Mar 2022 18:16 )    Color: Light Yellow / Appearance: Slightly Turbid / S.010 / pH:   Gluc:  / Ketone: Negative  / Bili: Negative / Urobili: <2 mg/dL   Blood:  / Protein: 30 mg/dL / Nitrite: Negative   Leuk Esterase: Large / RBC: 5 /HPF / WBC 54 /HPF   Sq Epi:  / Non Sq Epi: 0 /HPF / Bacteria: Negative        Urea Nitrogen,  Random Urine: 292 mg/dL (22 @ 18:16)    Sodium, Random Urine: 109.0 mmoL/L ( @ 18:16)  Creatinine, Random Urine: 53 mg/dL ( @ 18:16)    Creatinine trend:  Creatinine, Serum: 2.8 mg/dL (22 @ 06:00)  Creatinine, Serum: 2.9 mg/dL (22 @ 06:40)  Creatinine, Serum: 2.8 mg/dL (22 @ 17:10)  Creatinine, Serum: 3.1 mg/dL (22 @ 05:41)  Creatinine, Serum: 3.3 mg/dL (22 @ 12:32)  Creatinine, Serum: 2.7 mg/dL (21 @ 11:59)
SUBJECTIVE:    Patient is a 86y old Female who presents with a chief complaint of Shortness of breath (11 Mar 2022 09:17)    Overnight Events: Patient reports no chest pain, shortness of breath, cough or palpitations.   No events on telemetry. Patient is now on lasix 40 mg PO DAILY    PAST MEDICAL & SURGICAL HISTORY  Chronic kidney disease    Pacemaker    Hypertension    Gout    Diverticulitis  sigmoid    Diastolic heart failure    Rheumatoid arthritis    DVT, lower extremity    Artificial pacemaker    History of appendectomy    History of tonsillectomy    History of hysterectomy      SOCIAL HISTORY:  Negative for smoking/alcohol/drug use.     ALLERGIES:  Keflex (Swelling)    MEDICATIONS:  STANDING MEDICATIONS  apixaban 2.5 milliGRAM(s) Oral two times a day  aspirin  chewable 81 milliGRAM(s) Oral daily  atorvastatin 20 milliGRAM(s) Oral at bedtime  furosemide    Tablet 40 milliGRAM(s) Oral daily  hydrALAZINE 50 milliGRAM(s) Oral three times a day  isosorbide   mononitrate ER Tablet (IMDUR) 30 milliGRAM(s) Oral daily  latanoprost 0.005% Ophthalmic Solution 1 Drop(s) Both EYES at bedtime  metoprolol succinate ER 25 milliGRAM(s) Oral daily  pantoprazole    Tablet 40 milliGRAM(s) Oral before breakfast  sertraline 50 milliGRAM(s) Oral daily  timolol 0.5% Solution 1 Drop(s) Both EYES two times a day    PRN MEDICATIONS  acetaminophen     Tablet .. 650 milliGRAM(s) Oral every 6 hours PRN  aluminum hydroxide/magnesium hydroxide/simethicone Suspension 30 milliLiter(s) Oral every 6 hours PRN    VITALS:   T(F): 96.9, Max: 96.9 (03-11-22 @ 14:07)  HR: 70 (66 - 74)  BP: 120/60 (100/53 - 133/60)  RR: 18 (18 - 18)  SpO2: 100% (96% - 100%)    LABS:                        8.6    9.67  )-----------( 320      ( 12 Mar 2022 04:30 )             28.1     03-12    133<L>  |  97<L>  |  50<H>  ----------------------------<  115<H>  5.3<H>   |  24  |  3.5<H>    Ca    7.7<L>      12 Mar 2022 04:30  Mg     2.5     03-12    TPro  6.0  /  Alb  3.4<L>  /  TBili  0.2  /  DBili  x   /  AST  34  /  ALT  24  /  AlkPhos  127<H>  03-12 03-11-22 @ 07:01  -  03-12-22 @ 07:00  --------------------------------------------------------  IN: 935 mL / OUT: 750 mL / NET: 185 mL    03-12-22 @ 06:01  -  03-12-22 @ 10:37  --------------------------------------------------------  IN: 200 mL / OUT: 500 mL / NET: -300 mL          IMAGING/EKG:    NO imaging    PHYSICAL EXAM:  GEN: NAD, comfortable  LUNGS: CTAB  HEART: RRR  ABD: soft, NT/ND, +BS  EXT: no edema, PP b/l  NEURO: AAOX3
seen and examined  24 h events noted         PAST HISTORY  --------------------------------------------------------------------------------  No significant changes to PMH, PSH, FHx, SHx, unless otherwise noted    ALLERGIES & MEDICATIONS  --------------------------------------------------------------------------------  Allergies    Keflex (Swelling)    Intolerances      Standing Inpatient Medications  apixaban 2.5 milliGRAM(s) Oral two times a day  aspirin  chewable 81 milliGRAM(s) Oral daily  atorvastatin 20 milliGRAM(s) Oral at bedtime  furosemide    Tablet 40 milliGRAM(s) Oral daily  hydrALAZINE 50 milliGRAM(s) Oral three times a day  isosorbide   mononitrate ER Tablet (IMDUR) 30 milliGRAM(s) Oral daily  latanoprost 0.005% Ophthalmic Solution 1 Drop(s) Both EYES at bedtime  metoprolol succinate ER 25 milliGRAM(s) Oral daily  pantoprazole    Tablet 40 milliGRAM(s) Oral before breakfast  sertraline 50 milliGRAM(s) Oral daily  sodium zirconium cyclosilicate 5 Gram(s) Oral two times a day  timolol 0.5% Solution 1 Drop(s) Both EYES two times a day    PRN Inpatient Medications  acetaminophen     Tablet .. 650 milliGRAM(s) Oral every 6 hours PRN  aluminum hydroxide/magnesium hydroxide/simethicone Suspension 30 milliLiter(s) Oral every 6 hours PRN  guaifenesin/dextromethorphan Oral Liquid 10 milliLiter(s) Oral every 6 hours PRN          VITALS/PHYSICAL EXAM  --------------------------------------------------------------------------------  T(C): 35.9 (03-14-22 @ 04:30), Max: 36.2 (03-13-22 @ 20:31)  HR: 65 (03-14-22 @ 04:30) (65 - 70)  BP: 123/56 (03-14-22 @ 04:30) (123/56 - 133/77)  RR: 18 (03-14-22 @ 04:30) (18 - 18)  SpO2: 100% (03-14-22 @ 04:30) (100% - 100%)  Wt(kg): --        03-13-22 @ 07:01  -  03-14-22 @ 07:00  --------------------------------------------------------  IN: 450 mL / OUT: 500 mL / NET: -50 mL      Physical Exam:  	Gen: NAD,  	Pulm: decrease BS  B/L  	CV: S1S2; no rub  	Abd: +distended  	    LABS/STUDIES  --------------------------------------------------------------------------------              8.4    8.16  >-----------<  341      [03-14-22 @ 04:30]              26.7     129  |  95  |  47  ----------------------------<  84      [03-14-22 @ 04:30]  5.3   |  23  |  2.9        Ca     8.1     [03-14-22 @ 04:30]      Mg     2.4     [03-14-22 @ 04:30]    TPro  6.0  /  Alb  3.2  /  TBili  0.2  /  DBili  x   /  AST  25  /  ALT  17  /  AlkPhos  114  [03-14-22 @ 04:30]    Troponin 0.02      [03-13-22 @ 04:30]    Creatinine Trend:  SCr 2.9 [03-14 @ 04:30]  SCr 3.1 [03-13 @ 04:30]  SCr 3.5 [03-12 @ 04:30]  SCr 3.2 [03-11 @ 05:20]  SCr 2.8 [03-10 @ 06:20]    Urinalysis - [03-06-22 @ 18:16]      Color Light Yellow / Appearance Slightly Turbid / SG 1.010 / pH 6.0      Gluc Negative / Ketone Negative  / Bili Negative / Urobili <2 mg/dL       Blood Negative / Protein 30 mg/dL / Leuk Est Large / Nitrite Negative      RBC 5 / WBC 54 / Hyaline 3 / Gran  / Sq Epi  / Non Sq Epi 0 / Bacteria Negative      Ferritin 470      [03-10-22 @ 06:20]  PTH -- (Ca 8.4)      [03-07-22 @ 05:41]   32  TSH 2.22      [03-07-22 @ 05:41]  Lipid: chol 110, TG 60, HDL 35, LDL --      [03-07-22 @ 05:41]      
SUBJECTIVE:    Patient is a 86y old Female who presents with a chief complaint of Shortness of breath and chest pain (07 Mar 2022 15:58)    Overnight Events: Patient reports improved shortness of breath, no chest pain, no cough, no palpitations.  No events on telemetry. Repeat troponins were stable, ECG unchanged, EP interrogated the device, it is functioning properly. So, it is less likely that she had a cardiac ischemic event, or paced-induced tachycardia responsible for her ADHF.    A TTE showed an EF of 20-25%. Patient is being maintained on Lasix 40 mg IV BID, symptoms better, good urinary output.    PAST MEDICAL & SURGICAL HISTORY  Chronic kidney disease    Pacemaker    Hypertension    Gout    Diverticulitis  sigmoid    Diastolic heart failure    Rheumatoid arthritis    DVT, lower extremity    Artificial pacemaker    History of appendectomy    History of tonsillectomy    History of hysterectomy      SOCIAL HISTORY:  Negative for smoking/alcohol/drug use.     ALLERGIES:  Keflex (Swelling)    MEDICATIONS:  STANDING MEDICATIONS  apixaban 2.5 milliGRAM(s) Oral two times a day  aspirin  chewable 81 milliGRAM(s) Oral daily  atorvastatin 20 milliGRAM(s) Oral at bedtime  furosemide   Injectable 40 milliGRAM(s) IV Push every 12 hours  hydrALAZINE 25 milliGRAM(s) Oral three times a day  isosorbide   mononitrate ER Tablet (IMDUR) 30 milliGRAM(s) Oral daily  latanoprost 0.005% Ophthalmic Solution 1 Drop(s) Both EYES at bedtime  magnesium sulfate  IVPB 2 Gram(s) IV Intermittent once  metoprolol succinate ER 25 milliGRAM(s) Oral daily  pantoprazole    Tablet 40 milliGRAM(s) Oral before breakfast  sertraline 50 milliGRAM(s) Oral daily  timolol 0.5% Solution 1 Drop(s) Both EYES two times a day    PRN MEDICATIONS  acetaminophen     Tablet .. 650 milliGRAM(s) Oral every 6 hours PRN    VITALS:   T(F): 98, Max: 98 (03-07-22 @ 13:57)  HR: 67 (67 - 77)  BP: 166/73 (130/71 - 172/81)  RR: 17 (17 - 18)  SpO2: --    LABS:                        8.6    9.70  )-----------( 285      ( 08 Mar 2022 06:40 )             27.7     03-08    138  |  105  |  38<H>  ----------------------------<  81  4.6   |  21  |  2.9<H>    Ca    8.1<L>      08 Mar 2022 06:40  Phos  2.8     03-07  Mg     1.8     03-08    TPro  5.8<L>  /  Alb  3.2<L>  /  TBili  0.2  /  DBili  x   /  AST  30  /  ALT  34  /  AlkPhos  150<H>  03-08              CARDIAC MARKERS ( 07 Mar 2022 05:41 )  x     / 0.03 ng/mL / x     / x     / x      CARDIAC MARKERS ( 06 Mar 2022 20:00 )  x     / 0.02 ng/mL / x     / x     / x      CARDIAC MARKERS ( 06 Mar 2022 12:32 )  x     / 0.02 ng/mL / x     / x     / x              03-07-22 @ 07:01  -  03-08-22 @ 07:00  --------------------------------------------------------  IN: 120 mL / OUT: 475 mL / NET: -355 mL    03-08-22 @ 07:01  -  03-08-22 @ 11:02  --------------------------------------------------------  IN: 0 mL / OUT: 200 mL / NET: -200 mL          IMAGING/EKG:    No imaging    PHYSICAL EXAM:  GEN: NAD, comfortable  LUNGS: Better breath sounds heard bilaterally  HEART: RRR  ABD: soft, NT/ND, +BS  EXT: no edema  NEURO: AAO x3

## 2022-03-14 NOTE — PROGRESS NOTE ADULT - PROVIDER SPECIALTY LIST ADULT
Nephrology
Cardiology
Nephrology
Nephrology
Cardiology
Cardiology
Nephrology
Nephrology
Cardiology
Cardiology

## 2022-03-14 NOTE — PROGRESS NOTE ADULT - ASSESSMENT
86F w/ pmh of CKD 4, HFrEF s/p AICD, pulm HTN, DVT, p/w chest pain x 1 wk, complaining of chest pain for the past 1 week a/w SOB, recently     # OMERO on CKD 4 / CRS  / follows w/ Dr. Urrutia (last seen in 2020, creat ~2.5)  # ADHF / HFrEF, EF 25-30%  # HTN  # Normocytic anemia   - non oliguric  - creat improving   - continue lasix 40  - restrict fluid intake to <liter daily    - UA noted, +protein, WBCs (pt is asymptomatic)  /  check urine prot/creat ratio, serum free light chain ratio, spep/hamilton  - obtain renal/bladder ultrasound with pvr  - last phos level noted, no need for phos binder  - BP noted decrease hydralazine to 25 q 8   - sodium level trending down/ check plasm osm/ uosm , continue lasix, TSH ok   - d/c lokelma if K level remains < 5.3  - cardiology f/up appreciated   will follow

## 2022-03-14 NOTE — PROGRESS NOTE ADULT - ASSESSMENT
86y Female pmh HFrEF s/p AICD, pulm HTN, DVT, CKD complaining of chest pain for the past 1 week. Patient reports having intermittent sharp left side chest pain 3/10, no aggravating or relieving factors associated with SOB. Chest pain is reproducible on palpation. Patient was recently admitted in [facility] 1 week ago with similar pain and mentions that her cardiac work up was negative and she was discharged home. She also reports having occasional lower abdominal pain and difficulty swallowing. s/p EGD and colonoscopy in 07/2020.    #Atypical chest pain / bilateral opacities on CXR  # ADHF / HFrEF, EF 25-30%  - EKG noted for T-wave inversions that are secondary to the LBBB; Q waves in lead III  - elevated trops can be due to CKD, troponins are stable, patient no longer complains of chest pain  - admitted to tele  - TTE: depressed LVEF 20-25%; moderate mitral and tricuspid regurgitation; enlarged LA  - c/w home dose of hydralazine, nitrates and toprol; no ACEi/ARB/Entresto  - c/w Atorvastatin 20mg daily  - on aspirin, eliquis; no Plavix  - As for the volume overload, Patient is now switched to Lasix 40 mg PO qday; creatinine downtrending to 3.1 this AM    #HTN  - BP noted  - Home meds: toprol, hydralazine and lasix  - c/w toprol and hydralazine      #OMERO on CKD 4 / CRS    - baseline creatinine around 2.5, creatinine= 2.9 today  - follows with Dr. Urrutia as outpt  - UA noted, +protein, WBCs (pt is asymptomatic) ; strict i/o  - urine creatinine 55, Na 109, Urea Nitrogen 292  - Per nephro: BP noted decrease hydralazine to 25 q 8   - last phos level noted, no need for phos binder  - obtain renal/bladder ultrasound with pvr,  serum free light chain ratio, spep/hamilton  - monitor serum creatinine and electrolytes;     #Hyponatremia  -    - restrict fluid intake to <liter daily    - check plasm osm/ uosm   - Monitor BMP    # Dysphagia / Anemia  - s/p EGD and colonoscopy in 2020 with non-specific gastritis and Diverticulosis noted  - Anemia is probably due to CKD      NO ACE inhibitors/ ARB/ Entresto/ spironolactone since patient can become severely hyperkalemic     DVT: Eliquis  GI: pantoprazole  Diet: Renal diet  Activity: Increase as tolerated  Dispo: Acute for now, discharge home tomorrow pending stable kidney function     86y Female pmh HFrEF s/p AICD, pulm HTN, DVT, CKD complaining of chest pain for the past 1 week. Patient reports having intermittent sharp left side chest pain 3/10, no aggravating or relieving factors associated with SOB. Chest pain is reproducible on palpation. Patient was recently admitted in [facility] 1 week ago with similar pain and mentions that her cardiac work up was negative and she was discharged home. She also reports having occasional lower abdominal pain and difficulty swallowing. s/p EGD and colonoscopy in 07/2020.    #Atypical chest pain / bilateral opacities on CXR  # ADHF / HFrEF, EF 25-30%  - EKG noted for T-wave inversions that are secondary to the LBBB; Q waves in lead III  - elevated trops can be due to CKD, troponins are stable, patient no longer complains of chest pain  - admitted to tele  - TTE: depressed LVEF 20-25%; moderate mitral and tricuspid regurgitation; enlarged LA  - c/w home dose of hydralazine, nitrates and toprol; no ACEi/ARB/Entresto  - c/w Atorvastatin 20mg daily  - on aspirin, eliquis; no Plavix  - As for the volume overload, Patient is now switched to Lasix 40 mg PO qday;     #HTN  - BP noted  - Home meds: toprol, hydralazine and lasix  - c/w toprol and hydralazine      #OMERO on CKD 4 / CRS    - baseline creatinine around 2.5, creatinine= 2.9 today  - follows with Dr. Urrutia as outpt  - UA noted, +protein, WBCs (pt is asymptomatic) ; strict i/o  - urine creatinine 55, Na 109, Urea Nitrogen 292  - Per nephro: BP noted decrease hydralazine to 25 q 8   - last phos level noted, no need for phos binder  - per nephro: obtain renal/bladder ultrasound with pvr,  serum free light chain ratio, spep/hamilton  - monitor serum creatinine and electrolytes;     #Hyponatremia  -  this am  - restrict fluid intake to <liter daily    - check plasm osm/ uosm   - Monitor BMP    # Dysphagia / Anemia  - s/p EGD and colonoscopy in 2020 with non-specific gastritis and Diverticulosis noted  - Anemia is probably due to CKD      NO ACE inhibitors/ ARB/ Entresto/ spironolactone since patient can become severely hyperkalemic     DVT: Eliquis  GI: pantoprazole  Diet: Renal diet  Activity: Increase as tolerated  Dispo: Acute for now, discharge home tomorrow pending stable kidney function

## 2022-03-15 LAB
DEPRECATED KAPPA LC FREE/LAMBDA SER: 5.47 RATIO — SIGNIFICANT CHANGE UP (ref 0.7–6.02)
KAPPA LC UR-MCNC: 36.72 MG/L — HIGH
LAMBDA LC UR-MCNC: 6.71 MG/L — SIGNIFICANT CHANGE UP

## 2022-03-17 DIAGNOSIS — I42.8 OTHER CARDIOMYOPATHIES: ICD-10-CM

## 2022-03-17 DIAGNOSIS — N18.4 CHRONIC KIDNEY DISEASE, STAGE 4 (SEVERE): ICD-10-CM

## 2022-03-17 DIAGNOSIS — I13.0 HYPERTENSIVE HEART AND CHRONIC KIDNEY DISEASE WITH HEART FAILURE AND STAGE 1 THROUGH STAGE 4 CHRONIC KIDNEY DISEASE, OR UNSPECIFIED CHRONIC KIDNEY DISEASE: ICD-10-CM

## 2022-03-17 DIAGNOSIS — R13.10 DYSPHAGIA, UNSPECIFIED: ICD-10-CM

## 2022-03-17 DIAGNOSIS — D63.1 ANEMIA IN CHRONIC KIDNEY DISEASE: ICD-10-CM

## 2022-03-17 DIAGNOSIS — Z95.810 PRESENCE OF AUTOMATIC (IMPLANTABLE) CARDIAC DEFIBRILLATOR: ICD-10-CM

## 2022-03-17 DIAGNOSIS — Z98.890 OTHER SPECIFIED POSTPROCEDURAL STATES: ICD-10-CM

## 2022-03-17 DIAGNOSIS — I44.7 LEFT BUNDLE-BRANCH BLOCK, UNSPECIFIED: ICD-10-CM

## 2022-03-17 DIAGNOSIS — N17.9 ACUTE KIDNEY FAILURE, UNSPECIFIED: ICD-10-CM

## 2022-03-17 DIAGNOSIS — Z90.710 ACQUIRED ABSENCE OF BOTH CERVIX AND UTERUS: ICD-10-CM

## 2022-03-17 DIAGNOSIS — M10.9 GOUT, UNSPECIFIED: ICD-10-CM

## 2022-03-17 DIAGNOSIS — E87.1 HYPO-OSMOLALITY AND HYPONATREMIA: ICD-10-CM

## 2022-03-17 DIAGNOSIS — Z88.1 ALLERGY STATUS TO OTHER ANTIBIOTIC AGENTS STATUS: ICD-10-CM

## 2022-03-17 DIAGNOSIS — M06.9 RHEUMATOID ARTHRITIS, UNSPECIFIED: ICD-10-CM

## 2022-03-17 DIAGNOSIS — I50.33 ACUTE ON CHRONIC DIASTOLIC (CONGESTIVE) HEART FAILURE: ICD-10-CM

## 2022-03-17 DIAGNOSIS — R06.02 SHORTNESS OF BREATH: ICD-10-CM

## 2022-03-17 DIAGNOSIS — R79.89 OTHER SPECIFIED ABNORMAL FINDINGS OF BLOOD CHEMISTRY: ICD-10-CM

## 2022-03-17 DIAGNOSIS — I27.20 PULMONARY HYPERTENSION, UNSPECIFIED: ICD-10-CM

## 2022-03-17 DIAGNOSIS — R07.89 OTHER CHEST PAIN: ICD-10-CM

## 2022-03-18 ENCOUNTER — APPOINTMENT (OUTPATIENT)
Dept: CARE COORDINATION | Facility: HOME HEALTH | Age: 87
End: 2022-03-18
Payer: MEDICARE

## 2022-03-18 VITALS — RESPIRATION RATE: 16 BRPM

## 2022-03-18 PROCEDURE — 99349 HOME/RES VST EST MOD MDM 40: CPT | Mod: 95

## 2022-03-18 RX ORDER — APIXABAN 2.5 MG/1
2.5 TABLET, FILM COATED ORAL
Refills: 0 | Status: ACTIVE | COMMUNITY

## 2022-03-18 RX ORDER — TIMOLOL MALEATE 5 MG/ML
0.5 SOLUTION OPHTHALMIC
Qty: 5 | Refills: 0 | Status: ACTIVE | COMMUNITY
Start: 2018-07-10

## 2022-03-18 NOTE — COUNSELING
[Fall prevention counseling provided] : Fall prevention counseling provided [de-identified] : [employer] TCM STARS teaching: Enforced with patient need for daily weights. Advised to call for an increase of greater than 2 lbs. in a day or 5 pounds in a week. Adhere to low salt diet and educated patient on foods that should be avoided such as processed or fast food. Limit fluids to 1 liter a day which is 4-5 glasses. Continue medications as ordered.  Follow up with Cardiologist. Contact information given, patient advised to call with any questions/concerns\par

## 2022-03-18 NOTE — CHART NOTE - NSCHARTNOTEFT_GEN_A_CORE
1 attempt was made to reach the patient, which has been unsuccessful. Unable to leave voicemail on 03/17.
2 attempt(s) were made to reach patient, which have been unsuccessful. Unable to leave voicemail on (3/17/22 and 3/18/22).
Electrophysiology    Pts device _ DC ICD ([employer])_____ was interrogated on 03-07-22  Device working properly  epicardial LV lead is disabled  paced <1%  Events: no events  results d/w with primary team  Reviewed by attending    Contact EP ACP with any questions [phone]
Patient requested call back on 03/17/2022
This patient requires positioning of the body in ways not feasible with an ordinary bed in order to alleviate pain. Pillows and wedges have been considered and ruled out. It is medically necessary for this patient to receive a hospital bed for CHF. The patient requires the head of the bed to be elevated more than 30 degrees most of the time. Pillows and wedges have been considered and ruled out
Called in by RN as patient described left chest pain   - Vitals WNL   - Patient described a chest pain that's has been on and off since admission. Pain starts left midaxillary line and is nonradiating. No other associated symptoms   - PE: remarkable for tenderness on palpation   - ECG unremarkable   - Given 0.5 Dilaudid (OMERO Cr: 3)

## 2022-03-18 NOTE — PHYSICAL EXAM
[No Acute Distress] : no acute distress [No Respiratory Distress] : no respiratory distress  [No Edema] : there was no peripheral edema [No Rash] : no rash [Normal] : affect was normal and insight and judgment were intact

## 2022-03-18 NOTE — PLAN
[FreeTextEntry1] : cgf\par c/w lasix/ bb/isosorbide\par htn\par c/w isosorbide/ bb/ hydralazine\par ckd\par c/w lipiptor\par DVT\par c/weliquis\par

## 2022-03-18 NOTE — CHART NOTE - NSCHARTNOTESELECT_GEN_ALL_CORE
chest pain/Event Note
D/C APPOINTMENT/Event Note
Electrophysiology PA Note
Letter of Medical Necessity
d/c appt/Event Note
d/c/Event Note

## 2022-03-18 NOTE — HISTORY OF PRESENT ILLNESS
[Home] : at home, [unfilled] , at the time of the visit. [Other Location: e.g. Home (Enter Location, City,State)___] : at [unfilled] [Verbal consent obtained from patient] : the patient, [unfilled] [Family Member] : family member [FreeTextEntry1] : Pt eval for SOB [de-identified] : Hospital Course: \par Discharge Date	14-Mar-2022 \par Admission Date	06-Mar-2022 14:26 \par Reason for Admission	Shortness of breath \par Hospital Course	 \par 86y Female with PMH of HFrEF s/p AICD, pulm HTN, DVT, CKD complaining of chest \par pain for the past 1 week. Patient reports having intermittent sharp left side \par chest pain 3/10, no aggravating or relieving factors associated with SOB. Chest \par pain is reproducible on palpation. Patient was recently admitted in [facility] 2 \par weeks ago with similar pain and mentions that her cardiac work up was negative \par and she was discharged home. \par \par With a CXR consisting with volume overload and an elevated BNP, patient was \par diagnosed with acute decompensated heart failure, and patient was started on \par Lasix 40 mg IV BID. \par Limited workup of ischemic disease was negative, as troponin trend was stable \par and no ischemic changes on serial EKGs. \par \par Patient was followed daily, with switch to Lasix 40 mg PO daily, as patient was \par euvolemic and began to develop acute kidney injury. \par \par Patient presented with OMERO on admission, probably because of cardiorenal \par syndrome, corrected with diuretics. \par \par To be noted that a repeat TTE showed an EF= 20-25%, patient is not taking any \par ACEi/ ARB/ spironolactone as she previously became severely hyperkaliemic. She \par is however on Imdur, hydralazine and Toprol XL. \par

## 2022-03-18 NOTE — ASSESSMENT
[FreeTextEntry1] : pt awake, alert and oriented x 3 at time of tele, dtr present.  Pt had SOC with PT/OT today and f/u with PCP yesterday.  Fluid restriction and daily weights reviewed as well as medication regimen. Pt has f/u with Dr Mckinney next week.

## 2022-03-23 ENCOUNTER — APPOINTMENT (OUTPATIENT)
Dept: CARDIOLOGY | Facility: CLINIC | Age: 87
End: 2022-03-23
Payer: MEDICARE

## 2022-03-23 VITALS
WEIGHT: 120 LBS | BODY MASS INDEX: 21.26 KG/M2 | DIASTOLIC BLOOD PRESSURE: 84 MMHG | HEART RATE: 68 BPM | HEIGHT: 63 IN | SYSTOLIC BLOOD PRESSURE: 140 MMHG

## 2022-03-23 DIAGNOSIS — M79.89 OTHER SPECIFIED SOFT TISSUE DISORDERS: ICD-10-CM

## 2022-03-23 PROCEDURE — 93000 ELECTROCARDIOGRAM COMPLETE: CPT

## 2022-03-23 PROCEDURE — 99214 OFFICE O/P EST MOD 30 MIN: CPT

## 2022-03-23 NOTE — CARDIOLOGY SUMMARY
[___] : [unfilled] [LVEF ___%] : LVEF [unfilled]% [___] : [unfilled] [de-identified] : 12- Atrial paced rhyth IVCD LBBB morphology \par 3- NSR LBBB  [de-identified] : 3-7-2022 EF 25-30% moderate MR Mod TR

## 2022-03-23 NOTE — HISTORY OF PRESENT ILLNESS
[FreeTextEntry1] : The patient had been admitted to [facility] with HF exacerbation. The patient has improved after IV Lasix . Her Lasix was decreased because of CKD . She does need a component of pre renal azotemia to maintain out of HF . EF was decreaed to 25-30% .

## 2022-03-23 NOTE — ASSESSMENT
[FreeTextEntry1] : The patient has had a nonischemic CM . She has had multiple caths in the past . The patient has had a DVT and has had a PE in the remote past . The patient has been on eliquis . The patient has anemia She has had a panendoscopy last year which showed some erosive gastritis and internal hemorrhoids . The patient has has CKD  which is contributing to the anemia . Uncertain if she had been evaluated by hematology for hypercoagulable state . She has yet to see them . The patient has decreased LV systolic function . In the past the patient had CRT-d . According to his last note the patient just has a dual chamber ICD . This may account for the worsening LV systolic function

## 2022-03-27 ENCOUNTER — EMERGENCY (EMERGENCY)
Facility: HOSPITAL | Age: 87
LOS: 0 days | Discharge: HOME | End: 2022-03-28
Attending: STUDENT IN AN ORGANIZED HEALTH CARE EDUCATION/TRAINING PROGRAM | Admitting: EMERGENCY MEDICINE
Payer: MEDICARE

## 2022-03-27 VITALS
OXYGEN SATURATION: 98 % | HEIGHT: 63 IN | SYSTOLIC BLOOD PRESSURE: 179 MMHG | DIASTOLIC BLOOD PRESSURE: 88 MMHG | TEMPERATURE: 99 F | RESPIRATION RATE: 19 BRPM | HEART RATE: 80 BPM | WEIGHT: 119.05 LBS

## 2022-03-27 DIAGNOSIS — I50.30 UNSPECIFIED DIASTOLIC (CONGESTIVE) HEART FAILURE: ICD-10-CM

## 2022-03-27 DIAGNOSIS — Z95.0 PRESENCE OF CARDIAC PACEMAKER: Chronic | ICD-10-CM

## 2022-03-27 DIAGNOSIS — M10.9 GOUT, UNSPECIFIED: ICD-10-CM

## 2022-03-27 DIAGNOSIS — Z86.718 PERSONAL HISTORY OF OTHER VENOUS THROMBOSIS AND EMBOLISM: ICD-10-CM

## 2022-03-27 DIAGNOSIS — Z90.89 ACQUIRED ABSENCE OF OTHER ORGANS: Chronic | ICD-10-CM

## 2022-03-27 DIAGNOSIS — Z95.0 PRESENCE OF CARDIAC PACEMAKER: ICD-10-CM

## 2022-03-27 DIAGNOSIS — I13.0 HYPERTENSIVE HEART AND CHRONIC KIDNEY DISEASE WITH HEART FAILURE AND STAGE 1 THROUGH STAGE 4 CHRONIC KIDNEY DISEASE, OR UNSPECIFIED CHRONIC KIDNEY DISEASE: ICD-10-CM

## 2022-03-27 DIAGNOSIS — Z90.49 ACQUIRED ABSENCE OF OTHER SPECIFIED PARTS OF DIGESTIVE TRACT: Chronic | ICD-10-CM

## 2022-03-27 DIAGNOSIS — M06.9 RHEUMATOID ARTHRITIS, UNSPECIFIED: ICD-10-CM

## 2022-03-27 DIAGNOSIS — Z90.710 ACQUIRED ABSENCE OF BOTH CERVIX AND UTERUS: ICD-10-CM

## 2022-03-27 DIAGNOSIS — I27.20 PULMONARY HYPERTENSION, UNSPECIFIED: ICD-10-CM

## 2022-03-27 DIAGNOSIS — Z20.822 CONTACT WITH AND (SUSPECTED) EXPOSURE TO COVID-19: ICD-10-CM

## 2022-03-27 DIAGNOSIS — Z88.1 ALLERGY STATUS TO OTHER ANTIBIOTIC AGENTS STATUS: ICD-10-CM

## 2022-03-27 DIAGNOSIS — R07.89 OTHER CHEST PAIN: ICD-10-CM

## 2022-03-27 DIAGNOSIS — N18.9 CHRONIC KIDNEY DISEASE, UNSPECIFIED: ICD-10-CM

## 2022-03-27 DIAGNOSIS — Z90.710 ACQUIRED ABSENCE OF BOTH CERVIX AND UTERUS: Chronic | ICD-10-CM

## 2022-03-27 DIAGNOSIS — R77.8 OTHER SPECIFIED ABNORMALITIES OF PLASMA PROTEINS: ICD-10-CM

## 2022-03-27 DIAGNOSIS — Z90.89 ACQUIRED ABSENCE OF OTHER ORGANS: ICD-10-CM

## 2022-03-27 DIAGNOSIS — R07.9 CHEST PAIN, UNSPECIFIED: ICD-10-CM

## 2022-03-27 LAB
ALBUMIN SERPL ELPH-MCNC: 4.1 G/DL — SIGNIFICANT CHANGE UP (ref 3.5–5.2)
ALP SERPL-CCNC: 95 U/L — SIGNIFICANT CHANGE UP (ref 30–115)
ALT FLD-CCNC: 12 U/L — SIGNIFICANT CHANGE UP (ref 0–41)
ANION GAP SERPL CALC-SCNC: 14 MMOL/L — SIGNIFICANT CHANGE UP (ref 7–14)
AST SERPL-CCNC: 34 U/L — SIGNIFICANT CHANGE UP (ref 0–41)
BASOPHILS # BLD AUTO: 0.04 K/UL — SIGNIFICANT CHANGE UP (ref 0–0.2)
BASOPHILS NFR BLD AUTO: 0.7 % — SIGNIFICANT CHANGE UP (ref 0–1)
BILIRUB SERPL-MCNC: 0.2 MG/DL — SIGNIFICANT CHANGE UP (ref 0.2–1.2)
BUN SERPL-MCNC: 40 MG/DL — HIGH (ref 10–20)
CALCIUM SERPL-MCNC: 9.7 MG/DL — SIGNIFICANT CHANGE UP (ref 8.5–10.1)
CHLORIDE SERPL-SCNC: 102 MMOL/L — SIGNIFICANT CHANGE UP (ref 98–110)
CO2 SERPL-SCNC: 23 MMOL/L — SIGNIFICANT CHANGE UP (ref 17–32)
CREAT SERPL-MCNC: 2.8 MG/DL — HIGH (ref 0.7–1.5)
EGFR: 16 ML/MIN/1.73M2 — LOW
EOSINOPHIL # BLD AUTO: 0.35 K/UL — SIGNIFICANT CHANGE UP (ref 0–0.7)
EOSINOPHIL NFR BLD AUTO: 6.3 % — SIGNIFICANT CHANGE UP (ref 0–8)
GLUCOSE SERPL-MCNC: 99 MG/DL — SIGNIFICANT CHANGE UP (ref 70–99)
HCT VFR BLD CALC: 29.8 % — LOW (ref 37–47)
HGB BLD-MCNC: 9.2 G/DL — LOW (ref 12–16)
IMM GRANULOCYTES NFR BLD AUTO: 0.4 % — HIGH (ref 0.1–0.3)
LYMPHOCYTES # BLD AUTO: 1.39 K/UL — SIGNIFICANT CHANGE UP (ref 1.2–3.4)
LYMPHOCYTES # BLD AUTO: 25.2 % — SIGNIFICANT CHANGE UP (ref 20.5–51.1)
MCHC RBC-ENTMCNC: 29.3 PG — SIGNIFICANT CHANGE UP (ref 27–31)
MCHC RBC-ENTMCNC: 30.9 G/DL — LOW (ref 32–37)
MCV RBC AUTO: 94.9 FL — SIGNIFICANT CHANGE UP (ref 81–99)
MONOCYTES # BLD AUTO: 0.63 K/UL — HIGH (ref 0.1–0.6)
MONOCYTES NFR BLD AUTO: 11.4 % — HIGH (ref 1.7–9.3)
NEUTROPHILS # BLD AUTO: 3.09 K/UL — SIGNIFICANT CHANGE UP (ref 1.4–6.5)
NEUTROPHILS NFR BLD AUTO: 56 % — SIGNIFICANT CHANGE UP (ref 42.2–75.2)
NRBC # BLD: 0 /100 WBCS — SIGNIFICANT CHANGE UP (ref 0–0)
NT-PROBNP SERPL-SCNC: HIGH PG/ML (ref 0–300)
PLATELET # BLD AUTO: 290 K/UL — SIGNIFICANT CHANGE UP (ref 130–400)
POTASSIUM SERPL-MCNC: 4.5 MMOL/L — SIGNIFICANT CHANGE UP (ref 3.5–5)
POTASSIUM SERPL-SCNC: 4.5 MMOL/L — SIGNIFICANT CHANGE UP (ref 3.5–5)
PROT SERPL-MCNC: 7.5 G/DL — SIGNIFICANT CHANGE UP (ref 6–8)
RBC # BLD: 3.14 M/UL — LOW (ref 4.2–5.4)
RBC # FLD: 15.1 % — HIGH (ref 11.5–14.5)
SODIUM SERPL-SCNC: 139 MMOL/L — SIGNIFICANT CHANGE UP (ref 135–146)
TROPONIN T SERPL-MCNC: 0.03 NG/ML — CRITICAL HIGH
WBC # BLD: 5.52 K/UL — SIGNIFICANT CHANGE UP (ref 4.8–10.8)
WBC # FLD AUTO: 5.52 K/UL — SIGNIFICANT CHANGE UP (ref 4.8–10.8)

## 2022-03-27 PROCEDURE — 99284 EMERGENCY DEPT VISIT MOD MDM: CPT | Mod: CS,GC

## 2022-03-27 PROCEDURE — 93010 ELECTROCARDIOGRAM REPORT: CPT

## 2022-03-27 PROCEDURE — 71045 X-RAY EXAM CHEST 1 VIEW: CPT | Mod: 26

## 2022-03-28 VITALS
DIASTOLIC BLOOD PRESSURE: 85 MMHG | SYSTOLIC BLOOD PRESSURE: 155 MMHG | RESPIRATION RATE: 17 BRPM | HEART RATE: 70 BPM | OXYGEN SATURATION: 99 %

## 2022-03-28 LAB
SARS-COV-2 RNA SPEC QL NAA+PROBE: SIGNIFICANT CHANGE UP
TROPONIN T SERPL-MCNC: 0.02 NG/ML — HIGH
TROPONIN T SERPL-MCNC: 0.02 NG/ML — HIGH

## 2022-03-28 PROCEDURE — 99236 HOSP IP/OBS SAME DATE HI 85: CPT

## 2022-03-28 PROCEDURE — 93010 ELECTROCARDIOGRAM REPORT: CPT

## 2022-03-28 PROCEDURE — 99212 OFFICE O/P EST SF 10 MIN: CPT

## 2022-03-28 RX ORDER — APIXABAN 2.5 MG/1
2.5 TABLET, FILM COATED ORAL ONCE
Refills: 0 | Status: COMPLETED | OUTPATIENT
Start: 2022-03-28 | End: 2022-03-28

## 2022-03-28 RX ORDER — ISOSORBIDE MONONITRATE 60 MG/1
30 TABLET, EXTENDED RELEASE ORAL ONCE
Refills: 0 | Status: COMPLETED | OUTPATIENT
Start: 2022-03-28 | End: 2022-03-28

## 2022-03-28 RX ORDER — FUROSEMIDE 40 MG
40 TABLET ORAL ONCE
Refills: 0 | Status: COMPLETED | OUTPATIENT
Start: 2022-03-28 | End: 2022-03-28

## 2022-03-28 RX ORDER — METOPROLOL TARTRATE 50 MG
25 TABLET ORAL ONCE
Refills: 0 | Status: COMPLETED | OUTPATIENT
Start: 2022-03-28 | End: 2022-03-28

## 2022-03-28 RX ORDER — HYDRALAZINE HCL 50 MG
25 TABLET ORAL ONCE
Refills: 0 | Status: COMPLETED | OUTPATIENT
Start: 2022-03-28 | End: 2022-03-28

## 2022-03-28 RX ORDER — ACETAMINOPHEN 500 MG
650 TABLET ORAL ONCE
Refills: 0 | Status: COMPLETED | OUTPATIENT
Start: 2022-03-28 | End: 2022-03-28

## 2022-03-28 RX ADMIN — Medication 25 MILLIGRAM(S): at 10:43

## 2022-03-28 RX ADMIN — Medication 40 MILLIGRAM(S): at 10:43

## 2022-03-28 RX ADMIN — APIXABAN 2.5 MILLIGRAM(S): 2.5 TABLET, FILM COATED ORAL at 10:43

## 2022-03-28 RX ADMIN — ISOSORBIDE MONONITRATE 30 MILLIGRAM(S): 60 TABLET, EXTENDED RELEASE ORAL at 10:43

## 2022-03-28 RX ADMIN — Medication 650 MILLIGRAM(S): at 06:22

## 2022-03-28 RX ADMIN — Medication 650 MILLIGRAM(S): at 07:04

## 2022-03-28 NOTE — ED PROVIDER NOTE - CLINICAL SUMMARY MEDICAL DECISION MAKING FREE TEXT BOX
Discussed with Dr. Saldaña and MAR, as recommended, patient is placed in OBS for further evaluation and care.   Patient remained stable in ED, labs/CXR/EKG findings reviewed and discussed with patient. Discussed with EDOU/OBS provider, patient is admitted to EDOU for further evaluation of her symptoms.

## 2022-03-28 NOTE — ED PROVIDER NOTE - OBJECTIVE STATEMENT
86y Female pmh HFrEF s/p AICD, pulm HTN, DVT, CKD complaining of chest pain starting last night, starting middle of her chest radiating to right shoulder, dull, tightness sensation, nonreproducible, nonexertional. Denies sob, n/v, diaphoresis, f/c, abd pain.

## 2022-03-28 NOTE — PROGRESS NOTE ADULT - ASSESSMENT
·	Atypical chest pain with musculoskeletal features, no acute EKG changes.   ·	Chronic hypervolemia; better at this point   ·	HTN, acceptable (didn't take AM meds)   ·	CKD 4, not candidate for ACEi or ARB due to hyperkalemia in the past   ·	Anemia, likely to benefit from outpatient hematology evaluation (non urgent), in the setting of anemia of advanced kidney disease.     REC  ·	Continue with home meds at current dosing  ·	No further indication for acute workup. At this point there is no indication to admit patient to acute inpatient care.   ·	Patient to Follow up with cardiology Dr Mckinney and primary care physician.     Discussed with ED/OBS team.

## 2022-03-28 NOTE — ED CDU PROVIDER INITIAL DAY NOTE - ATTENDING CONTRIBUTION TO CARE
86y Female pmh HFrEF s/p AICD, pulm HTN, DVT, CKD   pt presents for eval of R shoulder pain w/ rad to chest. pt states pain worse w/ some shoulder movements. pt states pain radiates into chest and describes that pain as dull/tight sensation.  pt has some baseline SOB and has had recent hospitalizations for CHF exacerbations. pt states yesterday, she felt good from SOB standpoint and denies SOB now.  no n/v/diaphoresis. no fever/chills/cough.      vss  gen- NAD, aaox3  card-rrr  lungs-ctab, no wheezing or rhonchi  abd-sntnd, no guarding or rebound  neuro- full str/sensation, cn ii-xii grossly intact, normal coordination    ED w/u w/ elevated trop but within pt's baseline, BNP elevated but significantly lower than last time pt was admitted for CHF

## 2022-03-28 NOTE — ED CDU PROVIDER INITIAL DAY NOTE - PROGRESS NOTE DETAILS
pt reassessed late morning. pt feeling well, eating breakfast. O2 % on RA. pt speaking full sentences. pt well appearing, discussed case with Dr. Mckinney, cleared by cards, will see as outpatient

## 2022-03-28 NOTE — ED CDU PROVIDER INITIAL DAY NOTE - PHYSICAL EXAMINATION
PHYSICAL EXAM:    GENERAL: Alert, appears stated age, well appearing, non-toxic  SKIN: Warm, pink and dry. MMM.   HEAD: NC, AT  EYE: Normal lids/conjunctiva  ENT: Normal hearing, patent oropharynx  NECK: +supple. No meningismus, or JVD  Pulm: Bilateral BS, normal resp effort, no wheezes, stridor, or retractions  CV: RRR, no M/R/G, 2+and = radial pulses  Abd: soft, non-tender, non-distended  Mskel: no erythema, cyanosis, edema. no calf tenderness  Neuro: AAOx3, moving extremities

## 2022-03-28 NOTE — PROGRESS NOTE ADULT - SUBJECTIVE AND OBJECTIVE BOX
T H I S   I S    N O  T   A    F I N A L I Z E D   N O T RACHEL DELACRUZ  86y, Female  Allergy: Keflex (Swelling)    Hospital Day:     Patient seen and examined earlier today.     PMH/PSH:    Chronic kidney disease  Hypertension  Gout  Diverticulitis  Diastolic heart failure  Rheumatoid arthritis  DVT, lower extremity  Artificial pacemaker      LAST 24-Hr EVENTS:    VITALS:  T(F): 98.2 (03-28-22 @ 07:43), Max: 99.2 (03-27-22 @ 21:00)  HR: 67 (03-28-22 @ 07:43)  BP: 154/89 (03-28-22 @ 07:43) (132/69 - 179/88)  RR: 18 (03-28-22 @ 07:43)  SpO2: 100% (03-28-22 @ 07:43) on RA           TESTS & MEASUREMENTS:  Weight/Height/BMI  Height (cm): 160 (03-27-22 @ 21:00)  Weight (kg): 54 (03-27-22 @ 21:00)  BMI (kg/m2): 21.1 (03-27-22 @ 21:00)                          9.2    5.52  )-----------( 290      ( 27 Mar 2022 21:30 )             29.8         03-27    139  |  102  |  40<H>  ----------------------------<  99  4.5   |  23  |  2.8<H>    Creatinine, Serum: 2.9 mg/dL (03.14.22 @ 04:30)      Ca    9.7      27 Mar 2022 21:30    TPro  7.5  /  Alb  4.1  /  TBili  0.2  /  DBili  x   /  AST  34  /  ALT  12  /  AlkPhos  95  03-27    LIVER FUNCTIONS - ( 27 Mar 2022 21:30 )  Alb: 4.1 g/dL / Pro: 7.5 g/dL / ALK PHOS: 95 U/L / ALT: 12 U/L / AST: 34 U/L / GGT: x           CARDIAC MARKERS ( 28 Mar 2022 03:01 )  x     / 0.02 ng/mL / x     / x     / x      CARDIAC MARKERS ( 27 Mar 2022 21:30 )  x     / 0.03 ng/mL / x     / x     / x          Serum Pro-Brain Natriuretic Peptide: 32866 pg/mL (03-27-22 @ 22:25)  Serum Pro-Brain Natriuretic Peptide: 84600 pg/mL (03.06.22 @ 23:30)        COVID-19 PCR: NotDetec (03-28-22 @ 00:30)        A1C with Estimated Average Glucose Result: 5.2 % (03-07-22 @ 05:41)          RADIOLOGY, ECG, & ADDITIONAL TESTS:  12 Lead ECG:   Ventricular Rate 69 BPM  QTC Calculation(Bazett) 486 ms   Normal sinus rhythm  Non-specific intra-ventricular conduction block  Cannot rule out Anterior infarct , age undetermined  T wave abnormality, consider inferolateral ischemia  Abnormal ECG    Confirmed by Chau Carter ([id number]) on 3/27/2022 10:27:10 PM (03-27-22 @ 21:39)        HOME MEDICATIONS:  apixaban 2.5 mg oral tablet (03-06)  furosemide 40 mg oral tablet (03-14)  hydrALAZINE 25 mg oral tablet (03-06)  isosorbide mononitrate 30 mg oral tablet, extended release (11-07)  latanoprost 0.005% ophthalmic solution (11-07)  Lipitor 20 mg oral tablet (03-12)  Metoprolol Succinate ER 25 mg oral tablet, extended release (03-12)  PARICALCITOL 1 MCG CAP (03-06)  Timolol Maleate (Eqv-Timoptic) 0.5% ophthalmic solution (03-12)  Zoloft 50 mg oral tablet (03-12)      PHYSICAL EXAM:  GENERAL:   CHEST/LUNG:   HEART:   ABDOMEN:   EXTREMITIES:             RACHEL SUMMERS  86y, Female  Allergy: Keflex (Swelling)    Hospital Day:     Patient seen and examined earlier today.     PMH/PSH:    Chronic kidney disease  Hypertension  Gout  Diverticulitis  Diastolic heart failure  Rheumatoid arthritis  DVT, lower extremity  Artificial pacemaker    LAST 24-Hr EVENTS:  No issues reported by the ED team     VITALS:  T(F): 98.2 (03-28-22 @ 07:43), Max: 99.2 (03-27-22 @ 21:00)  HR: 67 (03-28-22 @ 07:43)  BP: 154/89 (03-28-22 @ 07:43) (132/69 - 179/88)  RR: 18 (03-28-22 @ 07:43)  SpO2: 100% (03-28-22 @ 07:43) on RA       TESTS & MEASUREMENTS:  Weight/Height/BMI  Height (cm): 160 (03-27-22 @ 21:00)  Weight (kg): 54 (03-27-22 @ 21:00)  BMI (kg/m2): 21.1 (03-27-22 @ 21:00)                          9.2    5.52  )-----------( 290      ( 27 Mar 2022 21:30 )             29.8     Ferritin, Serum: 470 ng/mL (03.10.22 @ 06:20)      03-27    139  |  102  |  40<H>  ----------------------------<  99  4.5   |  23  |  2.8<H>    Creatinine, Serum: 2.9 mg/dL (03.14.22 @ 04:30)      Ca    9.7      27 Mar 2022 21:30    TPro  7.5  /  Alb  4.1  /  TBili  0.2  /  DBili  x   /  AST  34  /  ALT  12  /  AlkPhos  95  03-27    LIVER FUNCTIONS - ( 27 Mar 2022 21:30 )  Alb: 4.1 g/dL / Pro: 7.5 g/dL / ALK PHOS: 95 U/L / ALT: 12 U/L / AST: 34 U/L / GGT: x           CARDIAC MARKERS ( 28 Mar 2022 03:01 )  x     / 0.02 ng/mL / x     / x     / x      CARDIAC MARKERS ( 27 Mar 2022 21:30 )  x     / 0.03 ng/mL / x     / x     / x          Serum Pro-Brain Natriuretic Peptide: 03241 pg/mL (03-27-22 @ 22:25)  Serum Pro-Brain Natriuretic Peptide: 45473 pg/mL (03.06.22 @ 23:30)        COVID-19 PCR: NotDetec (03-28-22 @ 00:30)        A1C with Estimated Average Glucose Result: 5.2 % (03-07-22 @ 05:41)          RADIOLOGY, ECG, & ADDITIONAL TESTS:  12 Lead ECG:   Ventricular Rate 69 BPM  QTC Calculation(Bazett) 486 ms   Normal sinus rhythm  Non-specific intra-ventricular conduction block  Cannot rule out Anterior infarct , age undetermined  T wave abnormality, consider inferolateral ischemia  Abnormal ECG    Confirmed by Chau Carter ([id number]) on 3/27/2022 10:27:10 PM (03-27-22 @ 21:39)        HOME MEDICATIONS:  apixaban 2.5 mg oral tablet (03-06)  furosemide 40 mg oral tablet (03-14)  hydrALAZINE 25 mg oral tablet (03-06)  isosorbide mononitrate 30 mg oral tablet, extended release (11-07)  latanoprost 0.005% ophthalmic solution (11-07)  Lipitor 20 mg oral tablet (03-12)  Metoprolol Succinate ER 25 mg oral tablet, extended release (03-12)  PARICALCITOL 1 MCG CAP (03-06)  Timolol Maleate (Eqv-Timoptic) 0.5% ophthalmic solution (03-12)  Zoloft 50 mg oral tablet (03-12)      PHYSICAL EXAM:  GENERAL: pleasant and in NAD/P  CHEST/LUNG: Clear to auscultation bilaterally   HEART: S1S2  ABDOMEN: BS(+), soft   EXTREMITIES:  no clubbing/ chronic skin changes

## 2022-03-28 NOTE — ED PROVIDER NOTE - ATTENDING CONTRIBUTION TO CARE
Patient is c/o chest pain, substernal area, intermittent episodes, associated with sob, so was 911 called. Patient and her daughter called the nurse from the insurance company, who spoke with her over the phone and recommended to call 911. EMS gave NTG and ASA with improvement in symptoms.   Vitals reviewed.   Patient is awake, alert, answering questions appropriately, appears comfortable and not in any distress.  Lungs: CTA, no wheezing, no crackles.  Abd: +BS, NT, ND, soft, no rebound, no guarding. No CVA tenderness, no rash.  A/P: Chest pain,   labs, EKG, CXR,   reevaluation.

## 2022-03-28 NOTE — ED ADULT NURSE NOTE - NS PRO PASSIVE SMOKE EXP
Assumed care.  IV infusing, medicated for pain with IV Fentanyl 100 mcg, instructed to collect urine so it can be strained.  Will continue to monitor.   No

## 2022-03-28 NOTE — ED CDU PROVIDER INITIAL DAY NOTE - NS ED ATTENDING STATEMENT MOD
This was a shared visit with the REJI. I reviewed and verified the documentation and independently performed the documented:

## 2022-03-28 NOTE — ED ADULT NURSE REASSESSMENT NOTE - NS ED NURSE REASSESS COMMENT FT1
Patient assessed aox4 on continuous cardiac monitoring. Safety precautions maintained cell bell in reach bed alarm in place.

## 2022-03-28 NOTE — ED CDU PROVIDER INITIAL DAY NOTE - NS ED ROS FT
Review of Systems    Constitutional: (-) fever   Eyes/ENT: (-) vision changes  Cardiovascular: (+) chest pain, (-) syncope (-) palpitations  Respiratory: (-) cough, (-) shortness of breath  Gastrointestinal: (-) vomiting, (-) diarrhea (-)black/bloody stools (-) abdominal pain  Genitourinary:  (-) dysuria   Musculoskeletal: (-) neck pain, (-) back pain, (-) leg pain/swelling  Integumentary: (-) rash, (-) edema  Neurological: (-) headache, (-) confusion  Hematologic: (-) easy bruising

## 2022-03-28 NOTE — ED ADULT NURSE REASSESSMENT NOTE - NS ED NURSE REASSESS COMMENT FT1
Assumed care from previous nurse LU Phipps c/o  chest pain , on OBSERVATION status , denies chest pain this time , AO x 4 , elevated troponin , MD aware , denies headache , denies dizziness , denies nausea , denies abdominal pain , denies visual changes , no SOB , denies  lightheadedness, on cardiac monitor , turned and repositioned , call light within reached , fall alarm on

## 2022-03-28 NOTE — ED CDU PROVIDER DISPOSITION NOTE - CARE PROVIDER_API CALL
Sterling Mckinney)  Cardiology; Cardiovascular Disease; Internal Medicine  [address] [place] [address]  Phone: [phone]  Fax: [phone]  Follow Up Time:

## 2022-03-28 NOTE — ED CDU PROVIDER INITIAL DAY NOTE - TOBACCO USE
Never smoker Consent (Spinal Accessory)/Introductory Paragraph: The rationale for Mohs was explained to the patient and consent was obtained. The risks, benefits and alternatives to therapy were discussed in detail. Specifically, the risks of damage to the spinal accessory nerve, infection, scarring, bleeding, prolonged wound healing, incomplete removal, allergy to anesthesia, and recurrence were addressed. Prior to the procedure, the treatment site was clearly identified and confirmed by the patient. All components of Universal Protocol/PAUSE Rule completed.

## 2022-03-28 NOTE — ED ADULT NURSE NOTE - CAS EDN DISCHARGE INTERVENTIONS
No suggestion  hepatic decompensation   Lost 5 pounds intentionally since seen in Liver clinic      IV discontinued, cath removed intact

## 2022-03-28 NOTE — ED CDU PROVIDER DISPOSITION NOTE - CLINICAL COURSE
pt observed in EDOU. serial trops stable, Cp atypical and w/ strong msk component. no resp distress observed. d/w cards who cleared pt for OP f/u. pt seen by hospitalist. meds ordered. OP h/o f/u to be established. hgb stable. no need for further inpt monitoring/care. can dc w/ return precautions

## 2022-03-29 ENCOUNTER — APPOINTMENT (OUTPATIENT)
Dept: CARE COORDINATION | Facility: HOME HEALTH | Age: 87
End: 2022-03-29
Payer: MEDICARE

## 2022-03-29 PROCEDURE — 99348 HOME/RES VST EST LOW MDM 30: CPT | Mod: 95

## 2022-03-30 NOTE — ASSESSMENT
[FreeTextEntry1] : Patient is an 86y Female enrolled in the TCM STARS program for CHF followed up after an ED presentation for C/p placed in observation and found stable for dc to home she has a  PMH  HFrEF s/p AICD, pulm HTN, DVT, and  CKD.  Patient is observed via tele health alert in NAD denies c/p, sob, cough wt gain (122lbs) and LE swelling. states compliant, with meds, diet and follow up. PCP scheduled for next week.

## 2022-03-30 NOTE — COUNSELING
[de-identified] : Pt was informed about CN’s role/ STARS program and overview of transitional care reviewed with patient. In addition, yellow contact card was provided. Pt educated on topics of importance such as compliance with all provider visits, prescribed medication regimen, and low salt diet. Pt encouraged calling CN with any issues, concerns or questions, also educated to notify CN if experiencing CP, SOB , cough, increased mucus/phlegm production, abdominal discomfort/swelling, difficulty sleeping or lying flat, fever, chills, fatigue, weight gain of 2-3lbs in 24 hours or 5lbs in one week,  dizziness, lightheadedness, n/v/d/c, swelling to extremities and/or any signs of CHF exacerbation as reviewed. Reassurance provided. Will continue to monitor\par \par

## 2022-03-30 NOTE — PHYSICAL EXAM
[No Acute Distress] : no acute distress [Well-Appearing] : well-appearing [No Respiratory Distress] : no respiratory distress  [No Accessory Muscle Use] : no accessory muscle use [No Edema] : there was no peripheral edema [Normal Affect] : the affect was normal [Alert and Oriented x3] : oriented to person, place, and time [Normal Insight/Judgement] : insight and judgment were intact

## 2022-03-30 NOTE — HISTORY OF PRESENT ILLNESS
[Home] : at home, [unfilled] , at the time of the visit. [Other Location: e.g. Home (Enter Location, City,State)___] : at [unfilled] [Family Member] : family member [Verbal consent obtained from patient] : the patient, [unfilled] [FreeTextEntry1] : follow up hospital discharge  [de-identified] : Patient is an 86y Female enrolled in the TCM STARS program for CHF followed up after an ED presentation for C/p placed in observation and found stable for dc to home she has a  PMH  HFrEF s/p AICD, pulm HTN, DVT, and  CKD.  Patient is observed via tele health alert in NAD denies c/p, sob, cough wt gain (122lbs) and LE swelling. states compliant, with meds, diet and follow up. PCP scheduled for next week.

## 2022-04-02 ENCOUNTER — EMERGENCY (EMERGENCY)
Facility: HOSPITAL | Age: 87
LOS: 0 days | Discharge: HOME | End: 2022-04-02
Attending: EMERGENCY MEDICINE | Admitting: EMERGENCY MEDICINE
Payer: MEDICARE

## 2022-04-02 VITALS — HEART RATE: 65 BPM | TEMPERATURE: 98 F | SYSTOLIC BLOOD PRESSURE: 181 MMHG | DIASTOLIC BLOOD PRESSURE: 85 MMHG

## 2022-04-02 VITALS
DIASTOLIC BLOOD PRESSURE: 76 MMHG | HEIGHT: 63 IN | RESPIRATION RATE: 17 BRPM | HEART RATE: 66 BPM | TEMPERATURE: 97 F | WEIGHT: 117.07 LBS | SYSTOLIC BLOOD PRESSURE: 158 MMHG | OXYGEN SATURATION: 99 %

## 2022-04-02 DIAGNOSIS — Z86.718 PERSONAL HISTORY OF OTHER VENOUS THROMBOSIS AND EMBOLISM: ICD-10-CM

## 2022-04-02 DIAGNOSIS — I12.9 HYPERTENSIVE CHRONIC KIDNEY DISEASE WITH STAGE 1 THROUGH STAGE 4 CHRONIC KIDNEY DISEASE, OR UNSPECIFIED CHRONIC KIDNEY DISEASE: ICD-10-CM

## 2022-04-02 DIAGNOSIS — Z90.710 ACQUIRED ABSENCE OF BOTH CERVIX AND UTERUS: ICD-10-CM

## 2022-04-02 DIAGNOSIS — Z95.0 PRESENCE OF CARDIAC PACEMAKER: ICD-10-CM

## 2022-04-02 DIAGNOSIS — Z90.49 ACQUIRED ABSENCE OF OTHER SPECIFIED PARTS OF DIGESTIVE TRACT: Chronic | ICD-10-CM

## 2022-04-02 DIAGNOSIS — Z87.19 PERSONAL HISTORY OF OTHER DISEASES OF THE DIGESTIVE SYSTEM: ICD-10-CM

## 2022-04-02 DIAGNOSIS — M10.9 GOUT, UNSPECIFIED: ICD-10-CM

## 2022-04-02 DIAGNOSIS — M25.512 PAIN IN LEFT SHOULDER: ICD-10-CM

## 2022-04-02 DIAGNOSIS — M06.9 RHEUMATOID ARTHRITIS, UNSPECIFIED: ICD-10-CM

## 2022-04-02 DIAGNOSIS — N18.9 CHRONIC KIDNEY DISEASE, UNSPECIFIED: ICD-10-CM

## 2022-04-02 DIAGNOSIS — Z90.710 ACQUIRED ABSENCE OF BOTH CERVIX AND UTERUS: Chronic | ICD-10-CM

## 2022-04-02 DIAGNOSIS — Z88.1 ALLERGY STATUS TO OTHER ANTIBIOTIC AGENTS STATUS: ICD-10-CM

## 2022-04-02 DIAGNOSIS — Z90.49 ACQUIRED ABSENCE OF OTHER SPECIFIED PARTS OF DIGESTIVE TRACT: ICD-10-CM

## 2022-04-02 DIAGNOSIS — I50.30 UNSPECIFIED DIASTOLIC (CONGESTIVE) HEART FAILURE: ICD-10-CM

## 2022-04-02 DIAGNOSIS — Z90.89 ACQUIRED ABSENCE OF OTHER ORGANS: Chronic | ICD-10-CM

## 2022-04-02 DIAGNOSIS — Z95.0 PRESENCE OF CARDIAC PACEMAKER: Chronic | ICD-10-CM

## 2022-04-02 LAB
ALBUMIN SERPL ELPH-MCNC: 3.6 G/DL — SIGNIFICANT CHANGE UP (ref 3.5–5.2)
ALP SERPL-CCNC: 80 U/L — SIGNIFICANT CHANGE UP (ref 30–115)
ALT FLD-CCNC: 9 U/L — SIGNIFICANT CHANGE UP (ref 0–41)
ANION GAP SERPL CALC-SCNC: 11 MMOL/L — SIGNIFICANT CHANGE UP (ref 7–14)
AST SERPL-CCNC: 28 U/L — SIGNIFICANT CHANGE UP (ref 0–41)
BASOPHILS # BLD AUTO: 0.04 K/UL — SIGNIFICANT CHANGE UP (ref 0–0.2)
BASOPHILS NFR BLD AUTO: 0.5 % — SIGNIFICANT CHANGE UP (ref 0–1)
BILIRUB SERPL-MCNC: 0.2 MG/DL — SIGNIFICANT CHANGE UP (ref 0.2–1.2)
BUN SERPL-MCNC: 40 MG/DL — HIGH (ref 10–20)
CALCIUM SERPL-MCNC: 9.2 MG/DL — SIGNIFICANT CHANGE UP (ref 8.5–10.1)
CHLORIDE SERPL-SCNC: 103 MMOL/L — SIGNIFICANT CHANGE UP (ref 98–110)
CO2 SERPL-SCNC: 23 MMOL/L — SIGNIFICANT CHANGE UP (ref 17–32)
CREAT SERPL-MCNC: 2.7 MG/DL — HIGH (ref 0.7–1.5)
EGFR: 17 ML/MIN/1.73M2 — LOW
EOSINOPHIL # BLD AUTO: 0.41 K/UL — SIGNIFICANT CHANGE UP (ref 0–0.7)
EOSINOPHIL NFR BLD AUTO: 5.4 % — SIGNIFICANT CHANGE UP (ref 0–8)
GLUCOSE SERPL-MCNC: 81 MG/DL — SIGNIFICANT CHANGE UP (ref 70–99)
HCT VFR BLD CALC: 29 % — LOW (ref 37–47)
HGB BLD-MCNC: 9.1 G/DL — LOW (ref 12–16)
IMM GRANULOCYTES NFR BLD AUTO: 0.4 % — HIGH (ref 0.1–0.3)
LYMPHOCYTES # BLD AUTO: 2.68 K/UL — SIGNIFICANT CHANGE UP (ref 1.2–3.4)
LYMPHOCYTES # BLD AUTO: 35.6 % — SIGNIFICANT CHANGE UP (ref 20.5–51.1)
MCHC RBC-ENTMCNC: 29.8 PG — SIGNIFICANT CHANGE UP (ref 27–31)
MCHC RBC-ENTMCNC: 31.4 G/DL — LOW (ref 32–37)
MCV RBC AUTO: 95.1 FL — SIGNIFICANT CHANGE UP (ref 81–99)
MONOCYTES # BLD AUTO: 1.02 K/UL — HIGH (ref 0.1–0.6)
MONOCYTES NFR BLD AUTO: 13.5 % — HIGH (ref 1.7–9.3)
NEUTROPHILS # BLD AUTO: 3.35 K/UL — SIGNIFICANT CHANGE UP (ref 1.4–6.5)
NEUTROPHILS NFR BLD AUTO: 44.6 % — SIGNIFICANT CHANGE UP (ref 42.2–75.2)
NRBC # BLD: 0 /100 WBCS — SIGNIFICANT CHANGE UP (ref 0–0)
NT-PROBNP SERPL-SCNC: HIGH PG/ML (ref 0–300)
PLATELET # BLD AUTO: 210 K/UL — SIGNIFICANT CHANGE UP (ref 130–400)
POTASSIUM SERPL-MCNC: 4.7 MMOL/L — SIGNIFICANT CHANGE UP (ref 3.5–5)
POTASSIUM SERPL-SCNC: 4.7 MMOL/L — SIGNIFICANT CHANGE UP (ref 3.5–5)
PROT SERPL-MCNC: 6.6 G/DL — SIGNIFICANT CHANGE UP (ref 6–8)
RBC # BLD: 3.05 M/UL — LOW (ref 4.2–5.4)
RBC # FLD: 14.8 % — HIGH (ref 11.5–14.5)
SODIUM SERPL-SCNC: 137 MMOL/L — SIGNIFICANT CHANGE UP (ref 135–146)
TROPONIN T SERPL-MCNC: 0.02 NG/ML — HIGH
WBC # BLD: 7.53 K/UL — SIGNIFICANT CHANGE UP (ref 4.8–10.8)
WBC # FLD AUTO: 7.53 K/UL — SIGNIFICANT CHANGE UP (ref 4.8–10.8)

## 2022-04-02 PROCEDURE — 71045 X-RAY EXAM CHEST 1 VIEW: CPT | Mod: 26

## 2022-04-02 PROCEDURE — 99285 EMERGENCY DEPT VISIT HI MDM: CPT | Mod: GC

## 2022-04-02 PROCEDURE — 93010 ELECTROCARDIOGRAM REPORT: CPT

## 2022-04-02 PROCEDURE — 73030 X-RAY EXAM OF SHOULDER: CPT | Mod: 26,LT

## 2022-04-02 RX ORDER — MORPHINE SULFATE 50 MG/1
2 CAPSULE, EXTENDED RELEASE ORAL ONCE
Refills: 0 | Status: DISCONTINUED | OUTPATIENT
Start: 2022-04-02 | End: 2022-04-02

## 2022-04-02 RX ADMIN — MORPHINE SULFATE 2 MILLIGRAM(S): 50 CAPSULE, EXTENDED RELEASE ORAL at 10:17

## 2022-04-02 NOTE — ED PROVIDER NOTE - PROGRESS NOTE DETAILS
Pt s/o to Dr. Dee to follow up labs, reassess and dispo. MOI: Case discussed with Dr. Peters via teams- agrees pain is not cardiac in nature and patient has had extensive cardiac work up. Patient is STARS patient as well - recommending discharge and outpatient follow up at this time.

## 2022-04-02 NOTE — ED PROVIDER NOTE - CLINICAL SUMMARY MEDICAL DECISION MAKING FREE TEXT BOX
Patient presented with acute onset of left shoulder pain awoke her from sleep.  Patient with frequent visits to the hospital for the same complaints and has had extensive cardiac work-up in the past.  Patient is a STAR patient as well.  Otherwise on arrival patient afebrile, hemodynamically stable, neurovascularly intact.  Pain is fully reproducible on exam.  Obtained EKG which was unchanged from prior EKGs without evidence of STEMI.  Obtained labs which were grossly unremarkable including no significant leukocytosis, anemia, signs of dehydration/OMERO, transaminitis or significant electrolyte abnormalities.  Troponin at baseline.  Spoke with Dr. Peters who agrees patient does not require admission to the hospital and can follow-up as outpatient.  Patient is agreeable with plan. Agrees to return to ED for any new or worsening symptoms.

## 2022-04-02 NOTE — ED ADULT NURSE NOTE - NS ED NOTE ABUSE RESPONSE YN
Detail Level: Detailed
Duration Of Freeze Thaw-Cycle (Seconds): 0
Number Of Freeze-Thaw Cycles: 2 freeze-thaw cycles
Post-Care Instructions: I reviewed with the patient in detail post-care instructions. Patient is to wear sunprotection, and avoid picking at any of the treated lesions. Pt may apply Vaseline to crusted or scabbing areas.
Consent: The patient's consent was obtained including but not limited to risks of crusting, scabbing, blistering, scarring, darker or lighter pigmentary change, recurrence, incomplete removal and infection.
Render Post-Care Instructions In Note?: no
Yes

## 2022-04-02 NOTE — ED PROVIDER NOTE - PATIENT PORTAL LINK FT
You can access the FollowMyHealth Patient Portal offered by [facility] by registering at the following website: [email or web address]. By joining [employer]’s FollowMyHealth portal, you will also be able to view your health information using other applications (apps) compatible with our system.

## 2022-04-02 NOTE — ED PROVIDER NOTE - ATTENDING CONTRIBUTION TO CARE
86-year-old female with PMH CHF, HTN, CKD, RA presents complaining of left shoulder pain that awoke her from sleep.  Patient states she has pain in her shoulder from time to time, sx improved at this time. Denies any chest pain, shortness of breath, palpitations, dizziness or weakness.  No cough, fevers, chills, N/V/D or abdominal pain.    VITAL SIGNS: noted  CONSTITUTIONAL: Well-developed; well-nourished; in no acute distress  HEAD: Normocephalic; atraumatic  EYES: PERRL, EOM intact; conjunctiva and sclera clear  ENT: No nasal discharge; airway clear. MMM  NECK: Supple; non tender.    CARD: S1, S2 normal; no murmurs, gallops, or rubs. Regular rate and rhythm  RESP: CTAB/L, no wheezes, rales or rhonchi  ABD: Normal bowel sounds; soft; non-distended; non-tender; no  CVA tenderness  EXT: Normal ROM. No calf tenderness or edema. Distal pulses intact  NEURO: Alert, oriented. Grossly unremarkable. No focal deficits  SKIN: Skin exam is warm and dry, no acute rash  MS: shoulder with FROM, nontender to palpation

## 2022-04-02 NOTE — ED PROVIDER NOTE - PHYSICAL EXAMINATION
VITAL SIGNS: I have reviewed nursing notes and confirm.  CONSTITUTIONAL: non-toxic, well appearing  SKIN: no rash, no petechiae.  EYES: pink conjunctiva, anicteric  NECK: Supple; no meningismus, no JVD  CARD: RRR, no murmurs, equal radial pulses bilaterally 2+  RESP: CTAB, no respiratory distress  ABD: Soft, non-tender, non-distended  EXT: + L shoulder w ttp, FROM but with pain, no obvious deformity or ecchymosis  NEURO: Alert, oriented.   PSYCH: Cooperative, appropriate.

## 2022-04-02 NOTE — ED PROVIDER NOTE - CARE PROVIDERS DIRECT ADDRESSES
[email or web address]

## 2022-04-02 NOTE — ED CLERICAL - NS ED CLERK NOTE PRE-ARRIVAL INFORMATION; ADDITIONAL PRE-ARRIVAL INFORMATION
This patient is enrolled in the STARS readmission reduction initiative and has active care navigation. This patient can be followed up by the care navigation team within 24 hours.  To arrange close follow-up or to obtain additional clinical information, please call the contact number above.     The on call [facility] Hospitalist has been notified and will coordinate care in concert with the ED Physician including consults as necessary. Call [phone] (North), [phone] (South) to reach the hospitalist. You may also call the Hospitalist Division at  at either site.     Consider CDU for management per guideline

## 2022-04-02 NOTE — ED PROVIDER NOTE - NS ED ROS FT
Review of Systems    Constitutional: (-) fever  Cardiovascular: (-) chest pain, (-) syncope  Respiratory: (-) cough, (-) shortness of breath  Gastrointestinal: (-) vomiting, (-) diarrhea, (-) abdominal pain  Musculoskeletal: (-) neck pain, (-) back pain, + L shoulder pain   Integumentary: (-) rash, (-) edema  Neurological: (-) headache, (-) altered mental status    Except as documented in the HPI, all other systems are negative.

## 2022-04-02 NOTE — ED ADULT NURSE NOTE - INTERVENTIONS DEFINITIONS
[place] to call system/Call bell, personal items and telephone within reach/Instruct patient to call for assistance/Physically safe environment: no spills, clutter or unnecessary equipment/Provide visual cue, wrist band, yellow gown, etc./Monitor gait and stability/Provide visual clues: red socks

## 2022-04-02 NOTE — ED PROVIDER NOTE - CARE PROVIDER_API CALL
Moise Ji (MD)  Orthopaedic Surgery  [address] [place] [address]  Phone: [phone]  Fax: [phone]  Follow Up Time:     Sterling Mckinney)  Cardiology; Cardiovascular Disease; Internal Medicine  [address] [place] [id number]  Phone: [phone]  Fax: [phone]  Follow Up Time:

## 2022-04-02 NOTE — ED PROVIDER NOTE - OBJECTIVE STATEMENT
86-year-old female with PMHx of CHF, Pacemaker, HTN, CKD, RA presents complaining of left shoulder pain that awoke her from sleep. Patient has intermittent pain in shoulder and other joints due to RA. Denies any f/c, chest pain, palpitations, cough, shortness of breath, n/v/d, abd pain, dizziness or weakness.

## 2022-04-25 ENCOUNTER — INPATIENT (INPATIENT)
Facility: HOSPITAL | Age: 87
LOS: 5 days | Discharge: HOME HEALTH | End: 2022-05-01
Attending: INTERNAL MEDICINE | Admitting: INTERNAL MEDICINE
Payer: MEDICARE

## 2022-04-25 VITALS
TEMPERATURE: 97 F | SYSTOLIC BLOOD PRESSURE: 138 MMHG | OXYGEN SATURATION: 99 % | HEART RATE: 68 BPM | RESPIRATION RATE: 18 BRPM | DIASTOLIC BLOOD PRESSURE: 64 MMHG | HEIGHT: 63 IN

## 2022-04-25 DIAGNOSIS — Z79.01 LONG TERM (CURRENT) USE OF ANTICOAGULANTS: ICD-10-CM

## 2022-04-25 DIAGNOSIS — Z90.710 ACQUIRED ABSENCE OF BOTH CERVIX AND UTERUS: Chronic | ICD-10-CM

## 2022-04-25 DIAGNOSIS — M54.9 DORSALGIA, UNSPECIFIED: ICD-10-CM

## 2022-04-25 DIAGNOSIS — M10.9 GOUT, UNSPECIFIED: ICD-10-CM

## 2022-04-25 DIAGNOSIS — K29.20 ALCOHOLIC GASTRITIS WITHOUT BLEEDING: ICD-10-CM

## 2022-04-25 DIAGNOSIS — E78.5 HYPERLIPIDEMIA, UNSPECIFIED: ICD-10-CM

## 2022-04-25 DIAGNOSIS — E87.5 HYPERKALEMIA: ICD-10-CM

## 2022-04-25 DIAGNOSIS — E87.1 HYPO-OSMOLALITY AND HYPONATREMIA: ICD-10-CM

## 2022-04-25 DIAGNOSIS — N18.4 CHRONIC KIDNEY DISEASE, STAGE 4 (SEVERE): ICD-10-CM

## 2022-04-25 DIAGNOSIS — K59.00 CONSTIPATION, UNSPECIFIED: ICD-10-CM

## 2022-04-25 DIAGNOSIS — Z86.711 PERSONAL HISTORY OF PULMONARY EMBOLISM: ICD-10-CM

## 2022-04-25 DIAGNOSIS — D63.1 ANEMIA IN CHRONIC KIDNEY DISEASE: ICD-10-CM

## 2022-04-25 DIAGNOSIS — R07.89 OTHER CHEST PAIN: ICD-10-CM

## 2022-04-25 DIAGNOSIS — Z90.89 ACQUIRED ABSENCE OF OTHER ORGANS: Chronic | ICD-10-CM

## 2022-04-25 DIAGNOSIS — Z86.718 PERSONAL HISTORY OF OTHER VENOUS THROMBOSIS AND EMBOLISM: ICD-10-CM

## 2022-04-25 DIAGNOSIS — N30.90 CYSTITIS, UNSPECIFIED WITHOUT HEMATURIA: ICD-10-CM

## 2022-04-25 DIAGNOSIS — K57.30 DIVERTICULOSIS OF LARGE INTESTINE WITHOUT PERFORATION OR ABSCESS WITHOUT BLEEDING: ICD-10-CM

## 2022-04-25 DIAGNOSIS — I27.20 PULMONARY HYPERTENSION, UNSPECIFIED: ICD-10-CM

## 2022-04-25 DIAGNOSIS — M06.9 RHEUMATOID ARTHRITIS, UNSPECIFIED: ICD-10-CM

## 2022-04-25 DIAGNOSIS — I13.0 HYPERTENSIVE HEART AND CHRONIC KIDNEY DISEASE WITH HEART FAILURE AND STAGE 1 THROUGH STAGE 4 CHRONIC KIDNEY DISEASE, OR UNSPECIFIED CHRONIC KIDNEY DISEASE: ICD-10-CM

## 2022-04-25 DIAGNOSIS — H40.9 UNSPECIFIED GLAUCOMA: ICD-10-CM

## 2022-04-25 DIAGNOSIS — N17.9 ACUTE KIDNEY FAILURE, UNSPECIFIED: ICD-10-CM

## 2022-04-25 DIAGNOSIS — R13.10 DYSPHAGIA, UNSPECIFIED: ICD-10-CM

## 2022-04-25 DIAGNOSIS — Z90.49 ACQUIRED ABSENCE OF OTHER SPECIFIED PARTS OF DIGESTIVE TRACT: Chronic | ICD-10-CM

## 2022-04-25 DIAGNOSIS — Z95.0 PRESENCE OF CARDIAC PACEMAKER: Chronic | ICD-10-CM

## 2022-04-25 DIAGNOSIS — I50.22 CHRONIC SYSTOLIC (CONGESTIVE) HEART FAILURE: ICD-10-CM

## 2022-04-25 DIAGNOSIS — E87.2 ACIDOSIS: ICD-10-CM

## 2022-04-25 LAB
ALBUMIN SERPL ELPH-MCNC: 4 G/DL — SIGNIFICANT CHANGE UP (ref 3.5–5.2)
ALP SERPL-CCNC: 72 U/L — SIGNIFICANT CHANGE UP (ref 30–115)
ALT FLD-CCNC: 9 U/L — SIGNIFICANT CHANGE UP (ref 0–41)
ANION GAP SERPL CALC-SCNC: 7 MMOL/L — SIGNIFICANT CHANGE UP (ref 7–14)
ANION GAP SERPL CALC-SCNC: 9 MMOL/L — SIGNIFICANT CHANGE UP (ref 7–14)
AST SERPL-CCNC: 27 U/L — SIGNIFICANT CHANGE UP (ref 0–41)
BASE EXCESS BLDV CALC-SCNC: -1.1 MMOL/L — SIGNIFICANT CHANGE UP (ref -2–3)
BASE EXCESS BLDV CALC-SCNC: 2.1 MMOL/L — SIGNIFICANT CHANGE UP (ref -2–3)
BASOPHILS # BLD AUTO: 0.04 K/UL — SIGNIFICANT CHANGE UP (ref 0–0.2)
BASOPHILS NFR BLD AUTO: 0.6 % — SIGNIFICANT CHANGE UP (ref 0–1)
BILIRUB SERPL-MCNC: 0.2 MG/DL — SIGNIFICANT CHANGE UP (ref 0.2–1.2)
BUN SERPL-MCNC: 56 MG/DL — HIGH (ref 10–20)
BUN SERPL-MCNC: 57 MG/DL — HIGH (ref 10–20)
CA-I SERPL-SCNC: 1.26 MMOL/L — SIGNIFICANT CHANGE UP (ref 1.15–1.33)
CA-I SERPL-SCNC: 1.88 MMOL/L — CRITICAL HIGH (ref 1.15–1.33)
CALCIUM SERPL-MCNC: 9.3 MG/DL — SIGNIFICANT CHANGE UP (ref 8.5–10.1)
CALCIUM SERPL-MCNC: 9.5 MG/DL — SIGNIFICANT CHANGE UP (ref 8.5–10.1)
CHLORIDE SERPL-SCNC: 100 MMOL/L — SIGNIFICANT CHANGE UP (ref 98–110)
CHLORIDE SERPL-SCNC: 100 MMOL/L — SIGNIFICANT CHANGE UP (ref 98–110)
CK SERPL-CCNC: 124 U/L — SIGNIFICANT CHANGE UP (ref 0–225)
CO2 SERPL-SCNC: 22 MMOL/L — SIGNIFICANT CHANGE UP (ref 17–32)
CO2 SERPL-SCNC: 25 MMOL/L — SIGNIFICANT CHANGE UP (ref 17–32)
CREAT SERPL-MCNC: 3.8 MG/DL — HIGH (ref 0.7–1.5)
CREAT SERPL-MCNC: 3.9 MG/DL — HIGH (ref 0.7–1.5)
EGFR: 11 ML/MIN/1.73M2 — LOW
EGFR: 11 ML/MIN/1.73M2 — LOW
EOSINOPHIL # BLD AUTO: 0.5 K/UL — SIGNIFICANT CHANGE UP (ref 0–0.7)
EOSINOPHIL NFR BLD AUTO: 7.6 % — SIGNIFICANT CHANGE UP (ref 0–8)
GAS PNL BLDV: 128 MMOL/L — LOW (ref 136–145)
GAS PNL BLDV: 128 MMOL/L — LOW (ref 136–145)
GAS PNL BLDV: SIGNIFICANT CHANGE UP
GLUCOSE SERPL-MCNC: 96 MG/DL — SIGNIFICANT CHANGE UP (ref 70–99)
GLUCOSE SERPL-MCNC: 98 MG/DL — SIGNIFICANT CHANGE UP (ref 70–99)
HCO3 BLDV-SCNC: 25 MMOL/L — SIGNIFICANT CHANGE UP (ref 22–29)
HCO3 BLDV-SCNC: 28 MMOL/L — SIGNIFICANT CHANGE UP (ref 22–29)
HCT VFR BLD CALC: 29.4 % — LOW (ref 37–47)
HCT VFR BLDA CALC: 24 % — LOW (ref 39–51)
HCT VFR BLDA CALC: 26 % — LOW (ref 39–51)
HGB BLD CALC-MCNC: 8.1 G/DL — LOW (ref 12.6–17.4)
HGB BLD CALC-MCNC: 8.7 G/DL — LOW (ref 12.6–17.4)
HGB BLD-MCNC: 9.1 G/DL — LOW (ref 12–16)
IMM GRANULOCYTES NFR BLD AUTO: 0.2 % — SIGNIFICANT CHANGE UP (ref 0.1–0.3)
LACTATE BLDV-MCNC: 0.6 MMOL/L — SIGNIFICANT CHANGE UP (ref 0.5–2)
LACTATE BLDV-MCNC: 1.3 MMOL/L — SIGNIFICANT CHANGE UP (ref 0.5–2)
LYMPHOCYTES # BLD AUTO: 2.69 K/UL — SIGNIFICANT CHANGE UP (ref 1.2–3.4)
LYMPHOCYTES # BLD AUTO: 40.9 % — SIGNIFICANT CHANGE UP (ref 20.5–51.1)
MCHC RBC-ENTMCNC: 28.9 PG — SIGNIFICANT CHANGE UP (ref 27–31)
MCHC RBC-ENTMCNC: 31 G/DL — LOW (ref 32–37)
MCV RBC AUTO: 93.3 FL — SIGNIFICANT CHANGE UP (ref 81–99)
MONOCYTES # BLD AUTO: 1.06 K/UL — HIGH (ref 0.1–0.6)
MONOCYTES NFR BLD AUTO: 16.1 % — HIGH (ref 1.7–9.3)
NEUTROPHILS # BLD AUTO: 2.27 K/UL — SIGNIFICANT CHANGE UP (ref 1.4–6.5)
NEUTROPHILS NFR BLD AUTO: 34.6 % — LOW (ref 42.2–75.2)
NRBC # BLD: 0 /100 WBCS — SIGNIFICANT CHANGE UP (ref 0–0)
PCO2 BLDV: 48 MMHG — HIGH (ref 39–42)
PCO2 BLDV: 49 MMHG — HIGH (ref 39–42)
PH BLDV: 7.32 — SIGNIFICANT CHANGE UP (ref 7.32–7.43)
PH BLDV: 7.37 — SIGNIFICANT CHANGE UP (ref 7.32–7.43)
PLATELET # BLD AUTO: 209 K/UL — SIGNIFICANT CHANGE UP (ref 130–400)
PO2 BLDV: 28 MMHG — SIGNIFICANT CHANGE UP
PO2 BLDV: 36 MMHG — SIGNIFICANT CHANGE UP
POTASSIUM BLDV-SCNC: 5.4 MMOL/L — HIGH (ref 3.5–5.1)
POTASSIUM BLDV-SCNC: 6.8 MMOL/L — CRITICAL HIGH (ref 3.5–5.1)
POTASSIUM SERPL-MCNC: 6 MMOL/L — CRITICAL HIGH (ref 3.5–5)
POTASSIUM SERPL-MCNC: 6.9 MMOL/L — CRITICAL HIGH (ref 3.5–5)
POTASSIUM SERPL-SCNC: 6 MMOL/L — CRITICAL HIGH (ref 3.5–5)
POTASSIUM SERPL-SCNC: 6.9 MMOL/L — CRITICAL HIGH (ref 3.5–5)
PROT SERPL-MCNC: 6.8 G/DL — SIGNIFICANT CHANGE UP (ref 6–8)
RBC # BLD: 3.15 M/UL — LOW (ref 4.2–5.4)
RBC # FLD: 13.8 % — SIGNIFICANT CHANGE UP (ref 11.5–14.5)
SAO2 % BLDV: 48.9 % — SIGNIFICANT CHANGE UP
SAO2 % BLDV: 61.1 % — SIGNIFICANT CHANGE UP
SARS-COV-2 RNA SPEC QL NAA+PROBE: SIGNIFICANT CHANGE UP
SODIUM SERPL-SCNC: 131 MMOL/L — LOW (ref 135–146)
SODIUM SERPL-SCNC: 132 MMOL/L — LOW (ref 135–146)
TROPONIN T SERPL-MCNC: 0.02 NG/ML — HIGH
WBC # BLD: 6.57 K/UL — SIGNIFICANT CHANGE UP (ref 4.8–10.8)
WBC # FLD AUTO: 6.57 K/UL — SIGNIFICANT CHANGE UP (ref 4.8–10.8)

## 2022-04-25 PROCEDURE — 99285 EMERGENCY DEPT VISIT HI MDM: CPT | Mod: FS

## 2022-04-25 PROCEDURE — 93010 ELECTROCARDIOGRAM REPORT: CPT

## 2022-04-25 RX ORDER — ONDANSETRON 8 MG/1
4 TABLET, FILM COATED ORAL ONCE
Refills: 0 | Status: COMPLETED | OUTPATIENT
Start: 2022-04-25 | End: 2022-04-25

## 2022-04-25 RX ORDER — SODIUM CHLORIDE 9 MG/ML
1000 INJECTION INTRAMUSCULAR; INTRAVENOUS; SUBCUTANEOUS ONCE
Refills: 0 | Status: DISCONTINUED | OUTPATIENT
Start: 2022-04-25 | End: 2022-04-25

## 2022-04-25 RX ORDER — MORPHINE SULFATE 50 MG/1
2 CAPSULE, EXTENDED RELEASE ORAL ONCE
Refills: 0 | Status: DISCONTINUED | OUTPATIENT
Start: 2022-04-25 | End: 2022-04-25

## 2022-04-25 RX ORDER — INSULIN HUMAN 100 [IU]/ML
10 INJECTION, SOLUTION SUBCUTANEOUS ONCE
Refills: 0 | Status: COMPLETED | OUTPATIENT
Start: 2022-04-25 | End: 2022-04-25

## 2022-04-25 RX ORDER — DEXTROSE 50 % IN WATER 50 %
50 SYRINGE (ML) INTRAVENOUS ONCE
Refills: 0 | Status: COMPLETED | OUTPATIENT
Start: 2022-04-25 | End: 2022-04-25

## 2022-04-25 RX ORDER — ALBUTEROL 90 UG/1
2 AEROSOL, METERED ORAL ONCE
Refills: 0 | Status: COMPLETED | OUTPATIENT
Start: 2022-04-25 | End: 2022-04-25

## 2022-04-25 RX ORDER — CALCIUM GLUCONATE 100 MG/ML
2 VIAL (ML) INTRAVENOUS ONCE
Refills: 0 | Status: COMPLETED | OUTPATIENT
Start: 2022-04-25 | End: 2022-04-25

## 2022-04-25 RX ADMIN — ALBUTEROL 2 PUFF(S): 90 AEROSOL, METERED ORAL at 21:14

## 2022-04-25 RX ADMIN — Medication 200 GRAM(S): at 20:55

## 2022-04-25 RX ADMIN — INSULIN HUMAN 10 UNIT(S): 100 INJECTION, SOLUTION SUBCUTANEOUS at 20:55

## 2022-04-25 RX ADMIN — MORPHINE SULFATE 2 MILLIGRAM(S): 50 CAPSULE, EXTENDED RELEASE ORAL at 16:16

## 2022-04-25 RX ADMIN — Medication 50 MILLILITER(S): at 20:56

## 2022-04-25 RX ADMIN — ONDANSETRON 4 MILLIGRAM(S): 8 TABLET, FILM COATED ORAL at 16:16

## 2022-04-25 NOTE — ED PROVIDER NOTE - PHYSICAL EXAMINATION
CONST: NAD  EYES: Sclera and conjunctiva clear.   ENT: No nasal discharge. Oropharynx normal appearing   NECK: Non-tender, no meningeal signs. normal ROM. supple   CARD: S1 S2; No jvd  RESP: Equal BS B/L, No wheezes, rhonchi or rales. No distress  GI: Soft, non-tender, non-distended. no cva tenderness. normal BS  MS: Reproducible R lumbar paraspinal tenderness. Normal ROM in all extremities. pulses 2 +. no calf tenderness  SKIN: Warm, dry, no acute rashes. Good turgor  NEURO: A&Ox3, No focal deficits. Strength 5/5 with no sensory deficits.

## 2022-04-25 NOTE — ED PROVIDER NOTE - CLINICAL SUMMARY MEDICAL DECISION MAKING FREE TEXT BOX
Patient presented with worsening lower back pain and right-sided upper and lower extremity pain over the past few days.  Pain is preventing her from walking since onset.  Otherwise arrival patient afebrile, hemodynamically stable, fully neurovascularly intact, no tenderness on exam to explain symptoms and full range of motion of all joints.  No red flags in history or exam.  Obtained labs which were remarkable for known CKD but also noted hyperkalemia on both BMP and VBG.  No metabolic acidosis and EKG without hyperkalemic changes.  Treated for hyperkalemia in ED, but given the above, will require admission for further monitoring.  Patient agreeable with plan.  Hemodynamically stable at time of admission.

## 2022-04-25 NOTE — ED PROVIDER NOTE - ATTENDING APP SHARED VISIT CONTRIBUTION OF CARE
86-year-old female past medical history as documented presenting with acute onset of lower back pain as well as right shoulder, right arm and right leg pain over the past few days.  Pain is described as crampy, radiating across her body, worse with movement, no palliative factors, moderate severity.  States she has been unable to ambulate secondary to the pain.  Otherwise denies fevers, weight loss, nausea/vomiting/diarrhea, blood in stool, urinary symptoms or any other complaints.  Also denies incontinence/retention, lower extremity weakness or numbness.    Vital Signs: I have reviewed the initial vital signs.  Constitutional: NAD, well-nourished, appears stated age, no acute distress.  HEENT: Airway patent, moist MM, no erythema/swelling/deformity of oral structures. EOMI, PERRLA.  CV: regular rate, regular rhythm, well-perfused extremities, 2+ b/l DP and radial pulses equal.  Lungs: BCTA, no increased WOB.  ABD: NTND, no guarding or rebound, no pulsatile mass, no hernias.   MSK: Neck supple, nontender, nl ROM, no stepoff. Chest nontender. Back nontender in TLS spine or to b/l bony structures or flanks. Ext nontender, nl rom, no deformity.   INTEG: Skin warm, dry, no rash.  NEURO: A&Ox3, normal strength, nl sensation throughout, normal speech.   PSYCH: Calm, cooperative, normal affect and interaction.    No tenderness on exam, full range of motion of all joints.  Will obtain labs, consider DVT study is bilateral legs are swollen although nontender, symptomatic control as needed, reeval.

## 2022-04-25 NOTE — ED ADULT NURSE NOTE - NSIMPLEMENTINTERV_GEN_ALL_ED
Implemented All Universal Safety Interventions:  [place] to call system. Call bell, personal items and telephone within reach. Instruct patient to call for assistance. Room bathroom lighting operational. Non-slip footwear when patient is off stretcher. Physically safe environment: no spills, clutter or unnecessary equipment. Stretcher in lowest position, wheels locked, appropriate side rails in place.

## 2022-04-25 NOTE — ED PROVIDER NOTE - OBJECTIVE STATEMENT
86y F pmh CKD, HF with AICD, DVT on Eliquis, HTN, RA presents for eval of back pain. Pt states she woke yesterday with R sided pain in her R shoulder, R lower back and down her R leg. Pain is aggravated with ambulation, improved at rest. Now presents due to difficulty ambulating at home due to R lower back pain. Denies trauma, ha, cp, sob, weakness, numbness, swelling, dysuria, hematuria, n/v/d/c

## 2022-04-25 NOTE — ED PROVIDER NOTE - NS ED ROS FT
Constitutional: (-) fever  Eyes/ENT: (-) blurry vision, (-) epistaxis  Cardiovascular: (-) chest pain, (-) syncope  Respiratory: (-) cough, (-) shortness of breath  Gastrointestinal: (-) vomiting, (-) diarrhea  : (-) dysuria, (-) hematuria  Musculoskeletal: (+) back pain, (+) joint pain, (-) neck pain  Integumentary: (-) rash, (-) edema  Neurological: (-) headache, (-) altered mental status  Allergic/Immunologic: (-) pruritus

## 2022-04-26 LAB
ALBUMIN SERPL ELPH-MCNC: 3.8 G/DL — SIGNIFICANT CHANGE UP (ref 3.5–5.2)
ALP SERPL-CCNC: 69 U/L — SIGNIFICANT CHANGE UP (ref 30–115)
ALT FLD-CCNC: 9 U/L — SIGNIFICANT CHANGE UP (ref 0–41)
ANION GAP SERPL CALC-SCNC: 11 MMOL/L — SIGNIFICANT CHANGE UP (ref 7–14)
ANION GAP SERPL CALC-SCNC: 12 MMOL/L — SIGNIFICANT CHANGE UP (ref 7–14)
ANION GAP SERPL CALC-SCNC: 12 MMOL/L — SIGNIFICANT CHANGE UP (ref 7–14)
AST SERPL-CCNC: 26 U/L — SIGNIFICANT CHANGE UP (ref 0–41)
BASOPHILS # BLD AUTO: 0.04 K/UL — SIGNIFICANT CHANGE UP (ref 0–0.2)
BASOPHILS NFR BLD AUTO: 0.6 % — SIGNIFICANT CHANGE UP (ref 0–1)
BILIRUB SERPL-MCNC: <0.2 MG/DL — SIGNIFICANT CHANGE UP (ref 0.2–1.2)
BUN SERPL-MCNC: 53 MG/DL — HIGH (ref 10–20)
BUN SERPL-MCNC: 54 MG/DL — HIGH (ref 10–20)
BUN SERPL-MCNC: 56 MG/DL — HIGH (ref 10–20)
CALCIUM SERPL-MCNC: 10.3 MG/DL — HIGH (ref 8.5–10.1)
CALCIUM SERPL-MCNC: 9.7 MG/DL — SIGNIFICANT CHANGE UP (ref 8.5–10.1)
CALCIUM SERPL-MCNC: 9.8 MG/DL — SIGNIFICANT CHANGE UP (ref 8.5–10.1)
CHLORIDE SERPL-SCNC: 100 MMOL/L — SIGNIFICANT CHANGE UP (ref 98–110)
CHLORIDE SERPL-SCNC: 100 MMOL/L — SIGNIFICANT CHANGE UP (ref 98–110)
CHLORIDE SERPL-SCNC: 98 MMOL/L — SIGNIFICANT CHANGE UP (ref 98–110)
CO2 SERPL-SCNC: 23 MMOL/L — SIGNIFICANT CHANGE UP (ref 17–32)
CO2 SERPL-SCNC: 23 MMOL/L — SIGNIFICANT CHANGE UP (ref 17–32)
CO2 SERPL-SCNC: 24 MMOL/L — SIGNIFICANT CHANGE UP (ref 17–32)
CREAT SERPL-MCNC: 3.8 MG/DL — HIGH (ref 0.7–1.5)
EGFR: 11 ML/MIN/1.73M2 — LOW
EOSINOPHIL # BLD AUTO: 0.46 K/UL — SIGNIFICANT CHANGE UP (ref 0–0.7)
EOSINOPHIL NFR BLD AUTO: 7.4 % — SIGNIFICANT CHANGE UP (ref 0–8)
FERRITIN SERPL-MCNC: 252 NG/ML — HIGH (ref 15–150)
GLUCOSE BLDC GLUCOMTR-MCNC: 102 MG/DL — HIGH (ref 70–99)
GLUCOSE BLDC GLUCOMTR-MCNC: 105 MG/DL — HIGH (ref 70–99)
GLUCOSE BLDC GLUCOMTR-MCNC: 142 MG/DL — HIGH (ref 70–99)
GLUCOSE BLDC GLUCOMTR-MCNC: 97 MG/DL — SIGNIFICANT CHANGE UP (ref 70–99)
GLUCOSE SERPL-MCNC: 77 MG/DL — SIGNIFICANT CHANGE UP (ref 70–99)
GLUCOSE SERPL-MCNC: 78 MG/DL — SIGNIFICANT CHANGE UP (ref 70–99)
GLUCOSE SERPL-MCNC: 90 MG/DL — SIGNIFICANT CHANGE UP (ref 70–99)
HCT VFR BLD CALC: 29.5 % — LOW (ref 37–47)
HGB BLD-MCNC: 9.4 G/DL — LOW (ref 12–16)
IMM GRANULOCYTES NFR BLD AUTO: 0.3 % — SIGNIFICANT CHANGE UP (ref 0.1–0.3)
LYMPHOCYTES # BLD AUTO: 1.64 K/UL — SIGNIFICANT CHANGE UP (ref 1.2–3.4)
LYMPHOCYTES # BLD AUTO: 26.4 % — SIGNIFICANT CHANGE UP (ref 20.5–51.1)
MAGNESIUM SERPL-MCNC: 2.1 MG/DL — SIGNIFICANT CHANGE UP (ref 1.8–2.4)
MCHC RBC-ENTMCNC: 29.7 PG — SIGNIFICANT CHANGE UP (ref 27–31)
MCHC RBC-ENTMCNC: 31.9 G/DL — LOW (ref 32–37)
MCV RBC AUTO: 93.1 FL — SIGNIFICANT CHANGE UP (ref 81–99)
MONOCYTES # BLD AUTO: 0.6 K/UL — SIGNIFICANT CHANGE UP (ref 0.1–0.6)
MONOCYTES NFR BLD AUTO: 9.7 % — HIGH (ref 1.7–9.3)
NEUTROPHILS # BLD AUTO: 3.45 K/UL — SIGNIFICANT CHANGE UP (ref 1.4–6.5)
NEUTROPHILS NFR BLD AUTO: 55.6 % — SIGNIFICANT CHANGE UP (ref 42.2–75.2)
NRBC # BLD: 0 /100 WBCS — SIGNIFICANT CHANGE UP (ref 0–0)
OSMOLALITY UR: 322 MOS/KG — SIGNIFICANT CHANGE UP (ref 50–1200)
PHOSPHATE SERPL-MCNC: 4.1 MG/DL — SIGNIFICANT CHANGE UP (ref 2.1–4.9)
PLATELET # BLD AUTO: 233 K/UL — SIGNIFICANT CHANGE UP (ref 130–400)
POTASSIUM SERPL-MCNC: 5.4 MMOL/L — HIGH (ref 3.5–5)
POTASSIUM SERPL-MCNC: 5.8 MMOL/L — HIGH (ref 3.5–5)
POTASSIUM SERPL-MCNC: 7.2 MMOL/L — CRITICAL HIGH (ref 3.5–5)
POTASSIUM SERPL-SCNC: 5.4 MMOL/L — HIGH (ref 3.5–5)
POTASSIUM SERPL-SCNC: 5.8 MMOL/L — HIGH (ref 3.5–5)
POTASSIUM SERPL-SCNC: 7.2 MMOL/L — CRITICAL HIGH (ref 3.5–5)
PROT SERPL-MCNC: 6.3 G/DL — SIGNIFICANT CHANGE UP (ref 6–8)
RBC # BLD: 3.17 M/UL — LOW (ref 4.2–5.4)
RBC # FLD: 13.8 % — SIGNIFICANT CHANGE UP (ref 11.5–14.5)
SODIUM SERPL-SCNC: 132 MMOL/L — LOW (ref 135–146)
SODIUM SERPL-SCNC: 135 MMOL/L — SIGNIFICANT CHANGE UP (ref 135–146)
SODIUM SERPL-SCNC: 136 MMOL/L — SIGNIFICANT CHANGE UP (ref 135–146)
SODIUM UR-SCNC: 92 MMOL/L — SIGNIFICANT CHANGE UP
WBC # BLD: 6.21 K/UL — SIGNIFICANT CHANGE UP (ref 4.8–10.8)
WBC # FLD AUTO: 6.21 K/UL — SIGNIFICANT CHANGE UP (ref 4.8–10.8)

## 2022-04-26 PROCEDURE — 93010 ELECTROCARDIOGRAM REPORT: CPT

## 2022-04-26 PROCEDURE — 93970 EXTREMITY STUDY: CPT | Mod: 26

## 2022-04-26 PROCEDURE — 99221 1ST HOSP IP/OBS SF/LOW 40: CPT | Mod: GC

## 2022-04-26 RX ORDER — ONDANSETRON 8 MG/1
4 TABLET, FILM COATED ORAL EVERY 8 HOURS
Refills: 0 | Status: DISCONTINUED | OUTPATIENT
Start: 2022-04-25 | End: 2022-05-01

## 2022-04-26 RX ORDER — INSULIN HUMAN 100 [IU]/ML
10 INJECTION, SOLUTION SUBCUTANEOUS ONCE
Refills: 0 | Status: COMPLETED | OUTPATIENT
Start: 2022-04-26 | End: 2022-04-26

## 2022-04-26 RX ORDER — ATORVASTATIN CALCIUM 80 MG/1
20 TABLET, FILM COATED ORAL AT BEDTIME
Refills: 0 | Status: DISCONTINUED | OUTPATIENT
Start: 2022-04-26 | End: 2022-05-01

## 2022-04-26 RX ORDER — FUROSEMIDE 40 MG
20 TABLET ORAL DAILY
Refills: 0 | Status: DISCONTINUED | OUTPATIENT
Start: 2022-04-26 | End: 2022-04-27

## 2022-04-26 RX ORDER — METOPROLOL TARTRATE 50 MG
25 TABLET ORAL DAILY
Refills: 0 | Status: DISCONTINUED | OUTPATIENT
Start: 2022-04-26 | End: 2022-05-01

## 2022-04-26 RX ORDER — DEXTROSE 50 % IN WATER 50 %
50 SYRINGE (ML) INTRAVENOUS ONCE
Refills: 0 | Status: DISCONTINUED | OUTPATIENT
Start: 2022-04-26 | End: 2022-04-26

## 2022-04-26 RX ORDER — APIXABAN 2.5 MG/1
2.5 TABLET, FILM COATED ORAL
Refills: 0 | Status: DISCONTINUED | OUTPATIENT
Start: 2022-04-26 | End: 2022-05-01

## 2022-04-26 RX ORDER — LATANOPROST 0.05 MG/ML
1 SOLUTION/ DROPS OPHTHALMIC; TOPICAL AT BEDTIME
Refills: 0 | Status: DISCONTINUED | OUTPATIENT
Start: 2022-04-26 | End: 2022-05-01

## 2022-04-26 RX ORDER — ACETAMINOPHEN 500 MG
650 TABLET ORAL EVERY 6 HOURS
Refills: 0 | Status: DISCONTINUED | OUTPATIENT
Start: 2022-04-25 | End: 2022-05-01

## 2022-04-26 RX ORDER — ISOSORBIDE MONONITRATE 60 MG/1
30 TABLET, EXTENDED RELEASE ORAL DAILY
Refills: 0 | Status: DISCONTINUED | OUTPATIENT
Start: 2022-04-26 | End: 2022-05-01

## 2022-04-26 RX ORDER — CHLORHEXIDINE GLUCONATE 213 G/1000ML
1 SOLUTION TOPICAL
Refills: 0 | Status: DISCONTINUED | OUTPATIENT
Start: 2022-04-25 | End: 2022-05-01

## 2022-04-26 RX ORDER — HYDRALAZINE HCL 50 MG
25 TABLET ORAL THREE TIMES A DAY
Refills: 0 | Status: DISCONTINUED | OUTPATIENT
Start: 2022-04-26 | End: 2022-05-01

## 2022-04-26 RX ORDER — DEXTROSE 10 % IN WATER 10 %
250 INTRAVENOUS SOLUTION INTRAVENOUS
Refills: 0 | Status: COMPLETED | OUTPATIENT
Start: 2022-04-26 | End: 2022-04-26

## 2022-04-26 RX ORDER — CALCIUM GLUCONATE 100 MG/ML
2 VIAL (ML) INTRAVENOUS ONCE
Refills: 0 | Status: COMPLETED | OUTPATIENT
Start: 2022-04-26 | End: 2022-04-26

## 2022-04-26 RX ORDER — FUROSEMIDE 40 MG
40 TABLET ORAL DAILY
Refills: 0 | Status: DISCONTINUED | OUTPATIENT
Start: 2022-04-26 | End: 2022-04-26

## 2022-04-26 RX ORDER — METHOCARBAMOL 500 MG/1
500 TABLET, FILM COATED ORAL EVERY 8 HOURS
Refills: 0 | Status: DISCONTINUED | OUTPATIENT
Start: 2022-04-26 | End: 2022-05-01

## 2022-04-26 RX ORDER — SERTRALINE 25 MG/1
50 TABLET, FILM COATED ORAL DAILY
Refills: 0 | Status: DISCONTINUED | OUTPATIENT
Start: 2022-04-26 | End: 2022-05-01

## 2022-04-26 RX ORDER — LANOLIN ALCOHOL/MO/W.PET/CERES
3 CREAM (GRAM) TOPICAL AT BEDTIME
Refills: 0 | Status: DISCONTINUED | OUTPATIENT
Start: 2022-04-25 | End: 2022-05-01

## 2022-04-26 RX ORDER — TIMOLOL 0.5 %
1 DROPS OPHTHALMIC (EYE)
Refills: 0 | Status: DISCONTINUED | OUTPATIENT
Start: 2022-04-26 | End: 2022-05-01

## 2022-04-26 RX ADMIN — Medication 25 MILLIGRAM(S): at 05:58

## 2022-04-26 RX ADMIN — Medication 650 MILLIGRAM(S): at 09:03

## 2022-04-26 RX ADMIN — Medication 25 MILLIGRAM(S): at 14:29

## 2022-04-26 RX ADMIN — Medication 200 GRAM(S): at 10:00

## 2022-04-26 RX ADMIN — Medication 250 MILLILITER(S): at 11:30

## 2022-04-26 RX ADMIN — Medication 20 MILLIGRAM(S): at 05:56

## 2022-04-26 RX ADMIN — ATORVASTATIN CALCIUM 20 MILLIGRAM(S): 80 TABLET, FILM COATED ORAL at 23:40

## 2022-04-26 RX ADMIN — SERTRALINE 50 MILLIGRAM(S): 25 TABLET, FILM COATED ORAL at 11:31

## 2022-04-26 RX ADMIN — METHOCARBAMOL 500 MILLIGRAM(S): 500 TABLET, FILM COATED ORAL at 05:57

## 2022-04-26 RX ADMIN — METHOCARBAMOL 500 MILLIGRAM(S): 500 TABLET, FILM COATED ORAL at 14:29

## 2022-04-26 RX ADMIN — ISOSORBIDE MONONITRATE 30 MILLIGRAM(S): 60 TABLET, EXTENDED RELEASE ORAL at 11:30

## 2022-04-26 RX ADMIN — Medication 25 MILLIGRAM(S): at 23:41

## 2022-04-26 RX ADMIN — Medication 1 DROP(S): at 06:05

## 2022-04-26 RX ADMIN — Medication 25 MILLIGRAM(S): at 05:56

## 2022-04-26 RX ADMIN — APIXABAN 2.5 MILLIGRAM(S): 2.5 TABLET, FILM COATED ORAL at 05:56

## 2022-04-26 RX ADMIN — INSULIN HUMAN 10 UNIT(S): 100 INJECTION, SOLUTION SUBCUTANEOUS at 09:57

## 2022-04-26 RX ADMIN — METHOCARBAMOL 500 MILLIGRAM(S): 500 TABLET, FILM COATED ORAL at 23:40

## 2022-04-26 RX ADMIN — Medication 650 MILLIGRAM(S): at 18:19

## 2022-04-26 RX ADMIN — APIXABAN 2.5 MILLIGRAM(S): 2.5 TABLET, FILM COATED ORAL at 18:17

## 2022-04-26 NOTE — PHYSICAL THERAPY INITIAL EVALUATION ADULT - SPECIFY REASON(S)
Pt with K 7.2 and EKG changes. Stat IV insulin and Calcium gluconate ordered. Will rpt BMP at noon. as discussed with DR Sushil FITZGERALD PT , rational discussed with Patient

## 2022-04-26 NOTE — H&P ADULT - ATTENDING COMMENTS
HPI:  Mrs. Marrufo 86 year old female (jehovahs witness) with PMX HFrEF 25% s/p AICD, pulm HTN, provoked PE/DVT on eliquis, DLD, glaucoma  CKD  presents to ED for "right shoulder pain, radiating to elbow, radiating to hip and radiating to toes" for one week which was worsening since last night. Patient reports she had symptoms of such in the past; does not recall what she was diagnosed with. Patient reports pain worsening on ambulations, and having a great difficult ambulating. Usually walks with assistance and a cane which has worsened. Patient reports pain improves on rest. Patient denies headaches, vision changes, sob, dizziness, chest pain.   Of note patient hospitalized multiple times in past; recently admitted 3/28/2022 for atypical chest pain most likely musculoskeletal no ekg changes and advised to follow up with cardiologist (Dr. Mckinney) outpatient.     In the ED, vitals stable. Labs significant for Na 131, K 6 (hemolyzed), creat 3.8 (previously 2.7), trop 0.02 (previously 0.02 1 month ago). Patient was given 10 units regular insulin, dextrose, calcium gluconate, morphine, Tylenol zofran and 1 L bolus of NS in ED. Patient currently admitted to medicine for further management.  (26 Apr 2022 00:22)    REVIEW OF SYSTEMS: see cc/HPI   CONSTITUTIONAL: No weakness, fevers or chills  EYES/ENT: No visual changes;  No vertigo or throat pain   NECK: No pain or stiffness  RESPIRATORY: No cough, wheezing, hemoptysis; No shortness of breath  CARDIOVASCULAR: No chest pain or palpitations  GASTROINTESTINAL: No abdominal or epigastric pain. No nausea, vomiting, or hematemesis; No diarrhea or constipation. No melena or hematochezia.  GENITOURINARY: No dysuria, frequency or hematuria  NEUROLOGICAL: No numbness or weakness  SKIN: No itching, rashes    Physical Exam:   General: WN/WD NAD  Neurology: A&Ox3, nonfocal, follows commands  Eyes: PERRLA/ EOMI  ENT/Neck: Neck supple, trachea midline, No JVD  Respiratory: CTA B/L, No wheezing, rales, rhonchi  CV: Normal rate regular rhythm, S1S2, no murmurs, rubs or gallops  Abdominal: Soft, NT, ND +BS,   Extremities: No edema, + peripheral pulses  Skin: No Rashes, Hematoma, Ecchymosis  Incisions:   Tubes:  A/p      PATIENT SEEN by ATTENDING 4/26/22 HPI:  Mrs. Marrufo 86 year old female (jehovahs witness) with PMX HFrEF 25% s/p AICD, pulm HTN, provoked PE/DVT on eliquis, DLD, glaucoma  CKD  presents to ED for "right shoulder pain, radiating to elbow, radiating to hip and radiating to toes" for one week which was worsening since last night. Patient reports she had symptoms of such in the past; does not recall what she was diagnosed with. Patient reports pain worsening on ambulations, and having a great difficult ambulating. Usually walks with assistance and a cane which has worsened. Patient reports pain improves on rest. Patient denies headaches, vision changes, sob, dizziness, chest pain.   Of note patient hospitalized multiple times in past; recently admitted 3/28/2022 for atypical chest pain most likely musculoskeletal no ekg changes and advised to follow up with cardiologist (Dr. Mckinney) outpatient.     In the ED, vitals stable. Labs significant for Na 131, K 6 (hemolyzed), creat 3.8 (previously 2.7), trop 0.02 (previously 0.02 1 month ago). Patient was given 10 units regular insulin, dextrose, calcium gluconate, morphine, Tylenol zofran and 1 L bolus of NS in ED. Patient currently admitted to medicine for further management.  (26 Apr 2022 00:22)    REVIEW OF SYSTEMS: see cc/HPI   CONSTITUTIONAL: No weakness, fevers or chills, (+) pain and diff ambulating   EYES/ENT: No visual changes;  No vertigo or throat pain   NECK: No pain or stiffness  RESPIRATORY: No cough, wheezing, hemoptysis; No shortness of breath  CARDIOVASCULAR: No chest pain or palpitations  GASTROINTESTINAL: No abdominal or epigastric pain. No nausea, vomiting, or hematemesis; No diarrhea or constipation. No melena or hematochezia.  GENITOURINARY: No dysuria, frequency or hematuria  NEUROLOGICAL: No numbness or weakness, ??? R LE sciatica   SKIN: No itching, rashes    Physical Exam:   General: WN/WD NAD  Neurology: A&Ox3, nonfocal, follows commands  Eyes: PERRLA/ EOMI  ENT/Neck: Neck supple, trachea midline, No JVD  Respiratory: CTA B/L, No wheezing, rales, rhonchi  CV: Normal rate regular rhythm, S1S2, no murmurs, rubs or gallops  Abdominal: Soft, NT, ND +BS,   Extremities: No edema, + peripheral pulses  Skin: No Rashes, Hematoma, Ecchymosis  Incisions: n/a  Tubes: n/a  A/p  Low Back pain w/ radiation to the RLE r/o Sciatica vs. r/o recurrent DVT (although still on A/c for Rx of remote DVT)  -Fall precautions   -OOB w/ assistance only  -PT/Rehab eval     OMERO on CKD IV   -hold diuretics   -Agree w/ urine lytes / Cr, Is and Os and serum phos/Mg  -Renal eval   -avoid nephrotoxic agents     Atypical CP in patient w/ h/o HFrEF  HTN  -c/w current Rx   -Cardiology follow up     Anemia of chronic disease - CKD /gastritis   -agree w/ iron studies and PRN Supplementation     Remote H/o DVT - still on Eliquis  -check venous duplex    PATIENT SEEN by ATTENDING 4/26/22

## 2022-04-26 NOTE — H&P ADULT - ASSESSMENT
Mrs. Marrufo 86 year old female with PMX HFrEF 25% s/p AICD, pulm HTN, DVT, CKD  presents to ED for right lower leg pain for one week.    #right lower leg pain/sciatica  - f/u xray  - c/w with pain control  - pt consult  -      #Atypical chest pain   # ADHF / HFrEF, EF 25-30%  - TTE 3/2022: depressed LVEF 20-25%; moderate mitral and tricuspid regurgitation; enlarged LA  - c/w home dose of hydralazine, nitrates and toprol, lasix 40 mg po;  no ACEi/ARB/Entresto  - c/w Atorvastatin 20mg daily  - on aspirin, eliquis; no Plavix  - NO ACE inhibitors/ ARB/ Entresto/ spironolactone since patient can become severely hyperkalemic as per cardiology     #HTN  - BP noted  - Home meds: toprol, hydralazine and lasix  - c/w toprol and hydralazine  -NO ACE inhibitors/ ARB/ Entresto/ spironolactone since patient can become severely hyperkalemic     # elevated serum creatinine: baseline creatinine around 2.6-2.8  - currently 3.8  - OMERO on CKD vs CKD 4 progression  - follows with Dr. Urrutia as outpt  - check UA, urine Na, urea, creatinine  - strict i/o  - check phos, iPTH  - monitor serum creatinine and electrolytes  - consider nephro consult      #Hx of PE/DVT  - c/w eliquis     #HDL  - c/w atorvastatin 40    #Glaucoma  - c/w eye drops    #hx Dysphagia / Anemia  - s/p EGD and colonoscopy in 2020 with non-specific gastritis and Diverticulosis  - Anemia can be due to CKD vs deficiency  - f/u iron panel    DVT: Eliquis  GI: pantoprazole  Diet: DASH/TLC/Renal diet  Activity: Increase as tolerated  Dispo: Acute         Mrs. Marrufo 86 year old female (jehovahs witness) with PMX HFrEF 25% s/p AICD, pulm HTN, provoked PE/DVT on eliquis, DLD, glaucoma  CKD  presents to ED for right lower leg pain for one week.    #right lower leg pain/sciatica  - f/u xray  - c/w with pain control, muscle relaxants   - pt consult  -      #Atypical chest pain   # ADHF / HFrEF, EF 25-30%  - TTE 3/2022: depressed LVEF 20-25%; moderate mitral and tricuspid regurgitation; enlarged LA  - c/w home dose of hydralazine, nitrates and toprol, lasix 40 mg po;  no ACEi/ARB/Entresto  - c/w Atorvastatin 20mg daily  - on aspirin, eliquis; no Plavix  - NO ACE inhibitors/ ARB/ Entresto/ spironolactone since patient can become severely hyperkalemic as per cardiology     #HTN  - BP noted  - Home meds: toprol, hydralazine and lasix  - c/w toprol and hydralazine  -NO ACE inhibitors/ ARB/ Entresto/ spironolactone since patient can become severely hyperkalemic     # elevated serum creatinine: baseline creatinine around 2.6-2.8  - currently 3.8  - OMERO on CKD vs CKD 4 progression  - follows with Dr. Urrutia as outpt  - check UA, urine Na, urea, creatinine  - strict i/o  - check phos, iPTH  - monitor serum creatinine and electrolytes  - consider nephro consult      #Hx of PE/DVT  - Hx of provoked PE in the past and soleal vein thrombosis?  - c/w eliquis     #HDL  - c/w atorvastatin 40    #Glaucoma  - c/w eye drops    #hx Dysphagia / Anemia  - s/p EGD and colonoscopy in 2020 with non-specific gastritis and Diverticulosis  - Anemia can be due to CKD vs deficiency  - f/u iron panel  -NO BLOOD TRANSFUSIONS AS PER PT WISHES, JEHOVA'S WITNESS    DVT: Eliquis  GI: pantoprazole  Diet: DASH/TLC/Renal diet  Activity: Increase as tolerated  Dispo: Acute         Mrs. Marrufo 86 year old female (jehovahs witness) with PMX HFrEF 25% s/p AICD, pulm HTN, provoked PE/DVT on eliquis, DLD, glaucoma  CKD  presents to ED for right lower leg pain for one week.    #right lower leg pain/sciatica  - f/u xray  - c/w with pain control, muscle relaxants   - pt consult  -      #Atypical chest pain   # ADHF / HFrEF, EF 25-30%  - TTE 3/2022: depressed LVEF 20-25%; moderate mitral and tricuspid regurgitation; enlarged LA  - c/w home dose of hydralazine, nitrates and toprol, lasix 40 mg po;  no ACEi/ARB/Entresto  - c/w Atorvastatin 20mg daily  - on aspirin, eliquis; no Plavix  - NO ACE inhibitors/ ARB/ Entresto/ spironolactone since patient can become severely hyperkalemic as per cardiology     #HTN  - Home meds: toprol, hydralazine and lasix  -NO ACE inhibitors/ ARB/ Entresto/ spironolactone since patient can become severely hyperkalemic     # elevated serum creatinine: baseline creatinine around 2.6-2.8  - currently 3.8  - OMERO on CKD vs CKD 4 progression  - follows with Dr. Urrutia as outpt  - check UA, urine Na, urea, creatinine  - check phos, iPTH  - monitor serum creatinine and electrolytes  - consider nephro consult      #Hx of PE/DVT  - Hx of provoked PE in the past and soleal vein thrombosis years ago?  - c/w eliquis 2.5 mg bid for now    #HDL  - c/w atorvastatin 20    #Glaucoma  - c/w eye drops    #hx Dysphagia / Anemia  - s/p EGD and colonoscopy in 2020 with non-specific gastritis and Diverticulosis  - Anemia can be due to CKD vs deficiency  - f/u iron panel  -NO BLOOD TRANSFUSIONS AS PER PT WISHES, JEHOVA'S WITNESS    DVT: Eliquis  GI: pantoprazole  Diet: DASH/TLC/Renal diet  Activity: Increase as tolerated  Dispo: Acute         Mrs. Marrufo 86 year old female (jehovahs witness) with PMX HFrEF 25% s/p AICD, pulm HTN, provoked PE/DVT on eliquis, DLD, glaucoma  CKD  presents to ED for right lower leg pain for one week.    #right lower leg pain/sciatica  - c/w with pain control, muscle relaxants   - pt consult  -      #Atypical chest pain   # ADHF / HFrEF, EF 25-30%  - TTE 3/2022: depressed LVEF 20-25%; moderate mitral and tricuspid regurgitation; enlarged LA  - c/w home dose of hydralazine, nitrates and toprol, lasix 20 mg po;  no ACEi/ARB/Entresto  - c/w Atorvastatin 20mg daily  - on aspirin, eliquis; no Plavix  - NO ACE inhibitors/ ARB/ Entresto/ spironolactone since patient can become severely hyperkalemic as per cardiology     #HTN  - Home meds: toprol, hydralazine and lasix  -NO ACE inhibitors/ ARB/ Entresto/ spironolactone since patient can become severely hyperkalemic     # elevated serum creatinine: baseline creatinine around 2.6-2.8  - currently 3.8  - OMERO on CKD vs CKD 4 progression  - follows with Dr. Urrutia as outpt  - check UA, urine Na, urea, creatinine  - check phos, iPTH  - monitor serum creatinine and electrolytes  - consider nephro consult      #Hx of PE/DVT  - Hx of provoked PE in the past and soleal vein thrombosis years ago?  - c/w eliquis 2.5 mg bid for now    #HDL  - c/w atorvastatin 20    #Glaucoma  - c/w eye drops    #hx Dysphagia / Anemia  - s/p EGD and colonoscopy in 2020 with non-specific gastritis and Diverticulosis  - Anemia can be due to CKD vs deficiency  - f/u iron panel  -NO BLOOD TRANSFUSIONS AS PER PT WISHES, JEHOVA'S WITNESS    DVT: Eliquis  GI: pantoprazole  Diet: DASH/TLC/Renal diet  Activity: Increase as tolerated  Dispo: Acute

## 2022-04-26 NOTE — CONSULT NOTE ADULT - SUBJECTIVE AND OBJECTIVE BOX
HPI:  Mrs. Marrufo 86 year old female (jehovahs witness) with PMX HFrEF 25% s/p AICD, pulm HTN, provoked PE/DVT on eliquis, DLD, glaucoma  CKD  presents to ED for "right shoulder pain, radiating to elbow, radiating to hip and radiating to toes" for one week which was worsening since last night. Patient reports she had symptoms of such in the past; does not recall what she was diagnosed with. Patient reports pain worsening on ambulations, and having a great difficult ambulating. Usually walks with assistance and a cane which has worsened. Patient reports pain improves on rest. Patient denies headaches, vision changes, sob, dizziness, chest pain.   Of note patient hospitalized multiple times in past; recently admitted 3/28/2022 for atypical chest pain most likely musculoskeletal no ekg changes and advised to follow up with cardiologist (Dr. Mckinney) outpatient.     In the ED, vitals stable. Labs significant for Na 131, K 6 (hemolyzed), creat 3.8 (previously 2.7), trop 0.02 (previously 0.02 1 month ago). Patient was given 10 units regular insulin, dextrose, calcium gluconate, morphine, Tylenol zofran and 1 L bolus of NS in ED. Patient currently admitted to medicine for further management.       PAST MEDICAL & SURGICAL HISTORY:  Chronic kidney disease    Pacemaker    Hypertension    Gout    Diverticulitis  sigmoid    Diastolic heart failure    Rheumatoid arthritis    DVT, lower extremity    Artificial pacemaker    History of appendectomy    History of tonsillectomy    History of hysterectomy        Hospital Course:    TODAY'S SUBJECTIVE & REVIEW OF SYMPTOMS:     Constitutional WNL   Cardio WNL   Resp WNL   GI WNL  Heme WNL  Endo WNL  Skin WNL  MSK pain  Neuro WNL  Cognitive WNL  Psych WNL      MEDICATIONS  (STANDING):  apixaban 2.5 milliGRAM(s) Oral two times a day  atorvastatin 20 milliGRAM(s) Oral at bedtime  chlorhexidine 4% Liquid 1 Application(s) Topical <User Schedule>  furosemide    Tablet 20 milliGRAM(s) Oral daily  hydrALAZINE 25 milliGRAM(s) Oral three times a day  isosorbide   mononitrate ER Tablet (IMDUR) 30 milliGRAM(s) Oral daily  latanoprost 0.005% Ophthalmic Solution 1 Drop(s) Both EYES at bedtime  methocarbamol 500 milliGRAM(s) Oral every 8 hours  metoprolol succinate ER 25 milliGRAM(s) Oral daily  sertraline 50 milliGRAM(s) Oral daily  timolol 0.5% Solution 1 Drop(s) Both EYES two times a day    MEDICATIONS  (PRN):  acetaminophen     Tablet .. 650 milliGRAM(s) Oral every 6 hours PRN Temp greater or equal to 38C (100.4F), Mild Pain (1 - 3)  aluminum hydroxide/magnesium hydroxide/simethicone Suspension 30 milliLiter(s) Oral every 4 hours PRN Dyspepsia  melatonin 3 milliGRAM(s) Oral at bedtime PRN Insomnia  ondansetron Injectable 4 milliGRAM(s) IV Push every 8 hours PRN Nausea and/or Vomiting      FAMILY HISTORY:  FH: arthritis  sister        Allergies    Keflex (Swelling)    Intolerances        SOCIAL HISTORY:    [  ] Etoh  [  ] Smoking  [  ] Substance abuse     Home Environment:  [   ] Home Alone  [ x  ] Lives with Family  [ x  ] Home Health Aid    Dwelling:  [   ] Apartment  [ x  ] Private House  [   ] Adult Home  [   ] Skilled Nursing Facility      [   ] Short Term  [   ] Long Term  [ x  ] Stairs       Elevator [   ]    FUNCTIONAL STATUS PTA: (Check all that apply)  Ambulation: [  x  ]Independent    [   ] Dependent     [   ] Non-Ambulatory  Assistive Device: [   ] SA Cane  [   ]  Q Cane  [ x  ] Walker  [   ]  Wheelchair  ADL : [   ] Independent  [  x  ]  Dependent       Vital Signs Last 24 Hrs  T(C): 36.6 (26 Apr 2022 07:35), Max: 36.6 (26 Apr 2022 07:35)  T(F): 97.8 (26 Apr 2022 07:35), Max: 97.8 (26 Apr 2022 07:35)  HR: 65 (26 Apr 2022 07:35) (65 - 65)  BP: 134/63 (26 Apr 2022 07:35) (134/63 - 134/63)  BP(mean): --  RR: 18 (26 Apr 2022 07:35) (18 - 18)  SpO2: 98% (26 Apr 2022 07:35) (98% - 98%)      PHYSICAL EXAM: Awake & Alert  GENERAL: NAD  HEAD:  Normocephalic  CHEST/LUNG: Clear   HEART: S1S2+  ABDOMEN: Soft, Nontender  EXTREMITIES:  no calf tenderness    NERVOUS SYSTEM:  Cranial Nerves 2-12 intact [   ] Abnormal  [   ]  ROM: WFL all extremities [   ]  Abnormal [   ]able to move all ext - limited participation  Motor Strength: WFL all extremities  [   ]  Abnormal [   ]  Sensation: intact to light touch [  x ] Abnormal [   ]    FUNCTIONAL STATUS:  Bed Mobility: Independent [   ]  Supervision [   ]  Needs Assistance [ x  ]  N/A [   ]  Transfers: Independent [   ]  Supervision [   ]  Needs Assistance [   ]  N/A [   ]   Ambulation: Independent [   ]  Supervision [   ]  Needs Assistance [   ]  N/A [   ]  ADL: Independent [   ] Requires Assistance [   ] N/A [   ]      LABS:                        9.4    6.21  )-----------( 233      ( 26 Apr 2022 07:30 )             29.5     04-26    135  |  100  |  56<H>  ----------------------------<  77  5.4<H>   |  23  |  3.8<H>    Ca    10.3<H>      26 Apr 2022 13:02  Phos  4.1     04-26  Mg     2.1     04-26    TPro  6.3  /  Alb  3.8  /  TBili  <0.2  /  DBili  x   /  AST  26  /  ALT  9   /  AlkPhos  69  04-26          RADIOLOGY & ADDITIONAL STUDIES:

## 2022-04-26 NOTE — H&P ADULT - NSHPPHYSICALEXAM_GEN_ALL_CORE
PHYSICAL EXAM:  GENERAL: NAD, lying in bed comfortably  HEAD:  Atraumatic, Normocephalic  ENT: Moist mucous membranes  NECK: Supple, No JVD  CHEST/LUNG: Clear to auscultation bilaterally  HEART: Regular rate and rhythm; No murmurs, rubs, or gallops  ABDOMEN: Soft, Nontender, Nondistended.   EXTREMITIES: No clubbing, cyanosis, or edema  NERVOUS SYSTEM:  Alert & Oriented X3, speech clear. No deficits   MSK:

## 2022-04-26 NOTE — CONSULT NOTE ADULT - ASSESSMENT
IMPRESSION: Rehab of gait dysfunction / right leg pain    PRECAUTIONS: [   ] Cardiac  [   ] Respiratory  [   ] Seizures [   ] Contact Isolation  [   ] Droplet Isolation  [   ] Other    Weight Bearing Status:     RECOMMENDATION:    Out of Bed to Chair     DVT/Decubiti Prophylaxis    REHAB PLAN:     [  x  ] Bedside P/T 3-5 times a week   [    ]   Bedside O/T  2-3 times a week             [    ] Speech Therapy               [    ]  No Rehab Therapy Indicated   Conditioning/ROM                                    ADL  Bed Mobility                                               Conditioning/ROM  Transfers                                                     Bed Mobility  Sitting /Standing Balance                         Transfers                                        Gait Training                                               Sitting/Standing Balance  Stair Training [   ]Applicable                    Home equipment Eval                                                                        Splinting  [   ] Only      GOALS:   ADL   [    ]   Independent                    Transfers  [  x  ] Independent                          Ambulation  [  x  ] Independent     [   x  ] With device                            [    ]  CG                                                         [    ]  CG                                                                  [    ] CG                            [    ] Min A                                                   [    ] Min A                                                              [    ] Min  A          DISCHARGE PLAN:   [    ]  Good candidate for Intensive Rehabilitation/Hospital based                                             Will tolerate 3hrs Intensive Rehab Daily                                       [   x  ]  Short Term Rehab in Skilled Nursing Facility                            vs           [   x  ]  Home with Outpatient or VN services                                         [     ]  Possible Candidate for Intensive Hospital based Rehab

## 2022-04-26 NOTE — PATIENT PROFILE ADULT - FUNCTIONAL ASSESSMENT - BASIC MOBILITY 5.
You can access the FollowMyHealth Patient Portal offered by [facility] by registering at the following website: [email or web address]. By joining [facility]’s FollowMyHealth portal, you will also be able to view your health information using other applications (apps) compatible with our system.
3 = A little assistance

## 2022-04-26 NOTE — H&P ADULT - HISTORY OF PRESENT ILLNESS
Mrs. Marrufo 86 year old female with PMX HFrEF 25% s/p AICD, pulm HTN, DVT, CKD  presents to ED for right lower leg pain for one week.    Of note patient recently admitted 3/28/2022 for atypical chest pain most likely musculoskeletal no ekg changes and advised to follow up with cardiologist (Dr. Mckinney) outpatient.     In the ED, vitals stable. Labs significant for Na 131, K 6 (hemolyzed), creat 3.8 (previously 2.7), trop 0.02 (previously 0.02 1 month ago). Patient was given 10 units regular insulin, dextrose, calcium gluconate, morphine, Tylenol zofran and 1 L bolus of NS in ED. Patient currently admitted to medicine for further management.  Mrs. Marrufo 86 year old female (jehovahs witness) with PMX HFrEF 25% s/p AICD, pulm HTN, provoked PE/DVT on eliquis, DLD, glaucoma  CKD  presents to ED for right lower leg pain for one week.      Of note patient recently admitted 3/28/2022 for atypical chest pain most likely musculoskeletal no ekg changes and advised to follow up with cardiologist (Dr. Mckinney) outpatient.     In the ED, vitals stable. Labs significant for Na 131, K 6 (hemolyzed), creat 3.8 (previously 2.7), trop 0.02 (previously 0.02 1 month ago). Patient was given 10 units regular insulin, dextrose, calcium gluconate, morphine, Tylenol zofran and 1 L bolus of NS in ED. Patient currently admitted to medicine for further management.  Mrs. Marrufo 86 year old female (jehovahs witness) with PMX HFrEF 25% s/p AICD, pulm HTN, provoked PE/DVT on eliquis, DLD, glaucoma  CKD  presents to ED for "right shoulder pain, radiating to elbow, radiating to hip and radiating to toes" for one week which was worsening since last night. Patient reports she had symptoms of such in the past; does not recall what she was diagnosed with. Patient reports pain worsening on ambulations, and having a great difficult ambulating. Usually walks with assistance and a cane which has worsened. Patient reports pain improves on rest. Patient denies headaches, vision changes, sob, dizziness, chest pain.   Of note patient hospitalized multiple times in past; recently admitted 3/28/2022 for atypical chest pain most likely musculoskeletal no ekg changes and advised to follow up with cardiologist (Dr. Mckinney) outpatient.     In the ED, vitals stable. Labs significant for Na 131, K 6 (hemolyzed), creat 3.8 (previously 2.7), trop 0.02 (previously 0.02 1 month ago). Patient was given 10 units regular insulin, dextrose, calcium gluconate, morphine, Tylenol zofran and 1 L bolus of NS in ED. Patient currently admitted to medicine for further management.

## 2022-04-26 NOTE — H&P ADULT - NSHPLABSRESULTS_GEN_ALL_CORE
<<<<<LABS>>>>>                        9.1    6.57  )-----------( 209      ( 25 Apr 2022 16:24 )             29.4     04-25    132<L>  |  100  |  56<H>  ----------------------------<  98  6.9<HH>   |  25  |  3.8<H>    Ca    9.5      25 Apr 2022 19:31    TPro  6.8  /  Alb  4.0  /  TBili  0.2  /  DBili  x   /  AST  27  /  ALT  9   /  AlkPhos  72  04-25          Troponin T, Serum: 0.02 ng/mL *H* (04-25-22 @ 21:19)  Creatine Kinase, Serum: 124 U/L (04-25-22 @ 16:24)    [id number]  CARDIAC MARKERS ( 25 Apr 2022 21:19 )  x     / 0.02 ng/mL / x     / x     / x      CARDIAC MARKERS ( 25 Apr 2022 16:24 )  x     / x     / 124 U/L / x     / x

## 2022-04-26 NOTE — PATIENT PROFILE ADULT - FALL HARM RISK - HARM RISK INTERVENTIONS

## 2022-04-26 NOTE — H&P ADULT - REASON FOR ADMISSION
Heart Failure   Follow Up Office Visit Note -     Daniella Gr   80 y o    male   MRN: [id number]  [address]  [address]  [place] [address] [facility] [address]  [phone]    PCP: Donavon Perez MD  Cardiologist : Dr Agueda Gutierrez      Impression/plan  Atrial fibrillation, persistent, rate controlled, new onset, duration unknown  On no AV gena agents  [id number], on OAC with Eliquis 2 5 BID ( age, Cr > 1 5)  EKG today:  AFib 71 beats per minute, right bundle branch block seen previously  Chronic diastolic heart failure, adm 11/13/19-11/17/19  --Discharge weight: 211 lb, 13 8 oz  OV wt today 217-home weights are stable on the increased dose of Lasix 40 b i d   --Discharge creatinine: 1 59  --Last labs: Dec 2  --Beta-Blocker: None given hx tachy toy  --Diuretic:  Was discharged on Lasix 40 daily  He gained 7 lb  Lasix was increased to 40 b i d  By his PCP November 27  He is on potassium 20 mEq daily  His last labs December 2nd showed stability of his renal function  His potassium was 4 2  --Educated regarding adherence to a 1 5g -2 gram sodium diet/ 2000 ml fluid restriction, daily weights and to call us if he gains 2-3 lbs in 1 day  5d lbs/ 1 week  MR, moderate  CKD3  Baseline Cr 1 5-stable  Tachy-toy syndrome-hx ILR implant 2009 with explant 2011  Hypertension  /78,  Hyperlipidemia  On atorvastatin 10 mg/d  LDL 35  11/14/19  T2DM  HgA1c 6 3  Chronic RBBB  Dementia  Cardiac testing  --TTE 11/14/19  EF 50  No RWMA  Mild LVH  Mild RV dilitation  VAISHALI, L> R  Moderate MR        Summary of Recommendations  Continue current cardiac medications  Favor continuing Lasix at 40 b i d , with K 20 /d  His son does not believe he would wear a 24 hour monitor    He was taking off the telemetry in the hospital   Follow up will be scheduled with Dr Agueda Gutierrez 3  months              HPI:   Lucero Ngo is an 79 yo male with a history of CAD, HTN, HL,CKD,diabetes and dementia  He barraza not have a history of a fib  He resides at home with his son  He presented to [facility] 11/13/19 with family who noted progressive SOB x 1 week  His EKG on adm showed rate controlled a fib; his CXR showed vascular congestion and small bilateral pleural effusions  An echocardiogram showed an EF of 50% with mild RV dilatation and moderate MR  His LA was dilated at 4 6cm  His NT BNP was elevated at 1 945  He was treated for acute diastolic HF and diuresed  He was discharged on Lasix 40 mg daily, previously on no diuretic prior to admission  He was discharged home with family  They declined VNA     Interval history  Saw his PCP 11/27/19  Gained 7 lb back/ + SOB  Lasix increased from 40 mg/d to 40 BID, K cl increased to 10 BID  BMP 1 week  Labs 12/2/19:  Creatinine 1 6, BUN 19, potassium 4 5        12/4/19 he is here for heart failure follow-up from the hospital   Mr Carrie Noland has fairly advanced dementia  History was provided by his son in law  His son son-in-law with home he resides, tells me he was consuming a very high sodium diet  That has now been changed  His son in law is  over seeing his diet and his medication administration  I recommended he take his furosemide about 6 hours apart  He had been taking  The 2nd dose close to bed  Given he is now on an anticoagulant, I cautioned him about getting up at night to urinate and and safety issues, etc  His son reports his father is likely going to be going to [place] with his girlfriend  I cautioned about adhering to a low-sodium diet and taking his medications as directed    Recommend follow-up in 3 months        Assessment  Diagnoses and all orders for this visit:    Hospital discharge follow-up    Chronic diastolic (congestive) heart failure (HCC)  -     POCT ECG    Coronary artery disease involving native coronary artery of native heart without angina pectoris    Hyperlipidemia, unspecified hyperlipidemia type    Cerebrovascular disease    Atrial fibrillation, unspecified type ([place] [address] )  -     POCT ECG    Tachy-toy syndrome ([place] [address] )        Past Medical History:   Diagnosis Date    Arthritis     CKD (chronic kidney disease)     Coronary artery disease     Diabetes mellitus ([place] [address] )     Hypercholesterolemia     Last assessed: 6/10/14    Hypertension     Tachycardia-bradycardia syndrome ([place] [address] )     Last assessed: 5/26/16    Vertigo        Review of Systems   Constitution: Negative for chills  Cardiovascular: Negative for chest pain, claudication, cyanosis, dyspnea on exertion, irregular heartbeat, leg swelling, near-syncope, orthopnea, palpitations, paroxysmal nocturnal dyspnea and syncope  Respiratory: Negative for cough and shortness of breath  Gastrointestinal: Negative for heartburn and nausea  Neurological: Negative for dizziness, focal weakness, headaches, light-headedness and weakness  All other systems reviewed and are negative  No Known Allergies        Current Outpatient Medications:     apixaban (ELIQUIS) 2 5 mg, Take 1 tablet (2 5 mg total) by mouth 2 (two) times a day, Disp: 60 tablet, Rfl: 2    atorvastatin (LIPITOR) 10 mg tablet, Take 1 tablet (10 mg total) by mouth daily, Disp: 90 tablet, Rfl: 2    ZOE MICROLET LANCETS lancets, by Other route daily, Disp: 100 each, Rfl: 3    donepezil (ARICEPT) 10 mg tablet, TAKE 1 TABLET (10 MG TOTAL) BY MOUTH DAILY AT BEDTIME, Disp: 90 tablet, Rfl: 5    finasteride (PROSCAR) 5 mg tablet, Take 1 tablet (5 mg total) by mouth daily, Disp: 90 tablet, Rfl: 2    furosemide (LASIX) 40 mg tablet, Take 1 tablet (40 mg total) by mouth 2 (two) times a day, Disp: 60 tablet, Rfl: 2    glucose blood (ZOE CONTOUR TEST) test strip, 1 each by Other route daily Test, Disp: 100 each, Rfl: 3    memantine (NAMENDA) 10 mg tablet, TAKE 1 TABLET BY MOUTH TWICE A DAY, Disp: 180 tablet, Rfl: 5    potassium chloride (K-DUR,KLOR-CON) 10 mEq tablet, Take 2 tablets (20 mEq total) by mouth daily right lower leg pain/social admit (Patient taking differently: Take 10 mEq by mouth 2 (two) times a day ), Disp: 60 tablet, Rfl: 2    sertraline (ZOLOFT) 100 mg tablet, Take 1 tablet (100 mg total) by mouth daily, Disp: 30 tablet, Rfl: 2    tamsulosin (FLOMAX) 0 4 mg, Take 1 capsule (0 4 mg total) by mouth daily for 90 days, Disp: 90 capsule, Rfl: 3    terazosin (HYTRIN) 5 mg capsule, Take 1 capsule (5 mg total) by mouth daily, Disp: 90 capsule, Rfl: 3    Social History     Socioeconomic History    Marital status:      Spouse name: Not on file    Number of children: Not on file    Years of education: Not on file    Highest education level: Not on file   Occupational History    Occupation: retired   Social Needs    Financial resource strain: Not on file    Food insecurity:     Worry: Not on file     Inability: Not on file   Frodio needs:     Medical: Not on file     Non-medical: Not on file   Tobacco Use    Smoking status: Former Smoker    Smokeless tobacco: Never Used    Tobacco comment: Never a smoker, per allscripts   Substance and Sexual Activity    Alcohol use:  Yes     Alcohol/week: 6 0 standard drinks     Types: 6 Standard drinks or equivalent per week     Comment: social, per allscripts    Drug use: No    Sexual activity: Not on file   Lifestyle    Physical activity:     Days per week: Not on file     Minutes per session: Not on file    Stress: Not on file   Relationships    Social connections:     Talks on phone: Not on file     Gets together: Not on file     Attends Bahai service: Not on file     Active member of club or organization: Not on file     Attends meetings of clubs or organizations: Not on file     Relationship status: Not on file    Intimate partner violence:     Fear of current or ex partner: Not on file     Emotionally abused: Not on file     Physically abused: Not on file     Forced sexual activity: Not on file   Other Topics Concern    Not on file   Social History Narrative , per allscripts       Family History   Problem Relation Age of Onset    No Known Problems Mother     Alzheimer's disease Father     Heart disease Father     Alzheimer's disease Brother        Physical Exam   Constitutional: He is oriented to person, place, and time  No distress  HENT:   Head: Normocephalic and atraumatic  Eyes: Conjunctivae and EOM are normal    Neck: Normal range of motion  Neck supple  Cardiovascular: Normal rate and intact distal pulses  An irregularly irregular rhythm present  Murmur heard  High-pitched blowing holosystolic murmur is present at the apex  Pulmonary/Chest: Effort normal and breath sounds normal    Abdominal: Soft  Bowel sounds are normal    Musculoskeletal: Normal range of motion  He exhibits no edema  Neurological: He is alert and oriented to person, place, and time  Skin: Skin is warm and dry  Psychiatric: He has a normal mood and affect  Nursing note and vitals reviewed  Vitals: Blood pressure 132/78, pulse 92, height 5' 10" (1 778 m), weight 98 4 kg (217 lb), SpO2 95 %     Wt Readings from Last 3 Encounters:   19 98 4 kg (217 lb)   19 99 2 kg (218 lb 12 8 oz)   19 96 1 kg (211 lb 13 8 oz)         Labs & Results:  Lab Results   Component Value Date    WBC 8 61 2019    HGB 11 5 (L) 2019    HCT 38 1 2019    MCV 94 2019     (L) 2019     (L) 2019     No results found for: BNP  No components found for: CHEM    Results for orders placed during the hospital encounter of 19   Echo complete with contrast if indicated    Narrative [facility], [address] [facility]  [phone]    Transthoracic Echocardiogram  2D, M-mode, Doppler, and Color Doppler    Study date:  2019    Patient: Tegan Garza  MR number: [id number]  Account number: [de-identified]  : 16-Oct-1930  Age: 80 years  Gender: Male  Status: Inpatient  Location: Bedside  Height: 70 in  Weight: 233 4 lb  BP: 138/ 80 mmHg    Indications: Heart Failure    Diagnoses: I50 9 - Heart failure, unspecified    Sonographer:  Liss Mock RDCS  Primary Physician:  Jameson Moritz, Jordy Hinojosa  Referring Physician:  Jameson Moritz, CRNP  Group:  [facility]  Interpreting Physician:  Mariana Grey MD    SUMMARY    LEFT VENTRICLE:  Size was at the upper limits of normal   Systolic function was at the lower limits of normal  Ejection fraction was estimated to be 50 %  Although no diagnostic regional wall motion abnormality was identified, this possibility cannot be completely excluded on the basis of this study  Wall thickness was mildly increased  RIGHT VENTRICLE:  The ventricle was mildly dilated  LEFT ATRIUM:  The atrium was moderately dilated  MITRAL VALVE:  There was mild to moderate annular calcification  There was moderate regurgitation  AORTIC VALVE:  There was mild regurgitation  IVC, HEPATIC VEINS:  The inferior vena cava was dilated  HISTORY: PRIOR HISTORY: Atrial fibrillation  Risk factors: hypertension, diabetes, and hypercholesterolemia  PROCEDURE: The procedure was performed at the bedside  This was a routine study  The transthoracic approach was used  The study included complete 2D imaging, M-mode, complete spectral Doppler, and color Doppler  The heart rate was 76 bpm,  at the start of the study  Echocardiographic views were limited due to decreased penetration and lung interference  This was a technically difficult study  LEFT VENTRICLE: Size was at the upper limits of normal  Systolic function was at the lower limits of normal  Ejection fraction was estimated to be 50 %  Although no diagnostic regional wall motion abnormality was identified, this  possibility cannot be completely excluded on the basis of this study  Wall thickness was mildly increased  DOPPLER: Transmitral flow pattern: atrial fibrillation      RIGHT VENTRICLE: The ventricle was mildly dilated  Systolic function was normal     LEFT ATRIUM: The atrium was moderately dilated  RIGHT ATRIUM: Size was normal     MITRAL VALVE: There was mild to moderate annular calcification  Valve structure was normal  There was normal leaflet separation  DOPPLER: The transmitral velocity was within the normal range  There was no evidence for stenosis  There was  moderate regurgitation  AORTIC VALVE: The valve was trileaflet  Leaflets exhibited normal thickness and normal cuspal separation  DOPPLER: Transaortic velocity was within the normal range  There was no evidence for stenosis  There was mild regurgitation  TRICUSPID VALVE: The valve structure was normal  There was normal leaflet separation  DOPPLER: The transtricuspid velocity was within the normal range  There was no evidence for stenosis  There was no significant regurgitation  The  tricuspid jet envelope definition was inadequate for estimation of RV systolic pressure  PULMONIC VALVE: Not well visualized  PERICARDIUM: There was no pericardial effusion  AORTA: The root exhibited normal size  SYSTEMIC VEINS: IVC: The inferior vena cava was dilated  SYSTEM MEASUREMENT TABLES    2D  %FS: 32 42 %  Ao Diam: 3 4 cm  EDV(Teich): 118 46 ml  EF(Teich): 60 47 %  ESV(Teich): 46 83 ml  IVSd: 1 13 cm  LA Area: 27 58 cm2  LA Diam: 4 65 cm  LVEDV MOD A4C: 137 83 ml  LVEF MOD A4C: 55 8 %  LVESV MOD A4C: 60 92 ml  LVIDd: 5 cm  LVIDs: 3 38 cm  LVLd A4C: 8 33 cm  LVLs A4C: 7 52 cm  LVPWd: 1 05 cm  RA Area: 13 73 cm2  RVIDd: 3 3 cm  SV MOD A4C: 76 91 ml  SV(Teich): 71 64 ml    CW  TR MaxP 47 mmHg  TR Vmax: 1 06 m/s    MM  TAPSE: 1 74 cm    PW  E': 0 1 m/s    [employer] Accredited Echocardiography Laboratory    Prepared and electronically signed by    Ravi Bassett MD  Signed [id number] 18:25:09       No results found for this or any previous visit        This note was completed in part utilizing m-modal fluency direct voice recognition software  Grammatical errors, random word insertion, spelling mistakes, and incomplete sentences may be an occasional consequence of the system secondary to software limitations, ambient noise and hardware issues  At the time of dictation, efforts were made to edit, clarify and /or correct errors  Please read the chart carefully and recognize, using context, where substitutions have occurred    If you have any questions or concerns about the context, text or information contained within the body of this dictation, please contact myself, the provider, for further clarification

## 2022-04-27 LAB
ALBUMIN SERPL ELPH-MCNC: 3.2 G/DL — LOW (ref 3.5–5.2)
ALP SERPL-CCNC: 64 U/L — SIGNIFICANT CHANGE UP (ref 30–115)
ALT FLD-CCNC: 10 U/L — SIGNIFICANT CHANGE UP (ref 0–41)
ANION GAP SERPL CALC-SCNC: 13 MMOL/L — SIGNIFICANT CHANGE UP (ref 7–14)
ANION GAP SERPL CALC-SCNC: 16 MMOL/L — HIGH (ref 7–14)
APPEARANCE UR: ABNORMAL
AST SERPL-CCNC: 29 U/L — SIGNIFICANT CHANGE UP (ref 0–41)
BACTERIA # UR AUTO: ABNORMAL
BILIRUB SERPL-MCNC: <0.2 MG/DL — SIGNIFICANT CHANGE UP (ref 0.2–1.2)
BILIRUB UR-MCNC: NEGATIVE — SIGNIFICANT CHANGE UP
BUN SERPL-MCNC: 54 MG/DL — HIGH (ref 10–20)
BUN SERPL-MCNC: 56 MG/DL — HIGH (ref 10–20)
CALCIUM SERPL-MCNC: 9.1 MG/DL — SIGNIFICANT CHANGE UP (ref 8.5–10.1)
CALCIUM SERPL-MCNC: 9.2 MG/DL — SIGNIFICANT CHANGE UP (ref 8.5–10.1)
CHLORIDE SERPL-SCNC: 94 MMOL/L — LOW (ref 98–110)
CHLORIDE SERPL-SCNC: 96 MMOL/L — LOW (ref 98–110)
CO2 SERPL-SCNC: 17 MMOL/L — SIGNIFICANT CHANGE UP (ref 17–32)
CO2 SERPL-SCNC: 21 MMOL/L — SIGNIFICANT CHANGE UP (ref 17–32)
COLOR SPEC: SIGNIFICANT CHANGE UP
CREAT ?TM UR-MCNC: 27 MG/DL — SIGNIFICANT CHANGE UP
CREAT ?TM UR-MCNC: 56 MG/DL — SIGNIFICANT CHANGE UP
CREAT SERPL-MCNC: 3.1 MG/DL — HIGH (ref 0.7–1.5)
CREAT SERPL-MCNC: 3.4 MG/DL — HIGH (ref 0.7–1.5)
DIFF PNL FLD: ABNORMAL
EGFR: 13 ML/MIN/1.73M2 — LOW
EGFR: 14 ML/MIN/1.73M2 — LOW
EPI CELLS # UR: 1 /HPF — SIGNIFICANT CHANGE UP (ref 0–5)
GLUCOSE BLDC GLUCOMTR-MCNC: 100 MG/DL — HIGH (ref 70–99)
GLUCOSE BLDC GLUCOMTR-MCNC: 114 MG/DL — HIGH (ref 70–99)
GLUCOSE BLDC GLUCOMTR-MCNC: 115 MG/DL — HIGH (ref 70–99)
GLUCOSE BLDC GLUCOMTR-MCNC: 136 MG/DL — HIGH (ref 70–99)
GLUCOSE SERPL-MCNC: 102 MG/DL — HIGH (ref 70–99)
GLUCOSE SERPL-MCNC: 150 MG/DL — HIGH (ref 70–99)
GLUCOSE UR QL: NEGATIVE — SIGNIFICANT CHANGE UP
HCT VFR BLD CALC: 28.2 % — LOW (ref 37–47)
HGB BLD-MCNC: 8.9 G/DL — LOW (ref 12–16)
HYALINE CASTS # UR AUTO: 0 /LPF — SIGNIFICANT CHANGE UP (ref 0–7)
KETONES UR-MCNC: NEGATIVE — SIGNIFICANT CHANGE UP
LEUKOCYTE ESTERASE UR-ACNC: ABNORMAL
MCHC RBC-ENTMCNC: 28.9 PG — SIGNIFICANT CHANGE UP (ref 27–31)
MCHC RBC-ENTMCNC: 31.6 G/DL — LOW (ref 32–37)
MCV RBC AUTO: 91.6 FL — SIGNIFICANT CHANGE UP (ref 81–99)
NITRITE UR-MCNC: POSITIVE
NRBC # BLD: 0 /100 WBCS — SIGNIFICANT CHANGE UP (ref 0–0)
OSMOLALITY UR: 264 MOS/KG — SIGNIFICANT CHANGE UP (ref 50–1200)
PH UR: 6 — SIGNIFICANT CHANGE UP (ref 5–8)
PLATELET # BLD AUTO: 196 K/UL — SIGNIFICANT CHANGE UP (ref 130–400)
POTASSIUM SERPL-MCNC: 5.1 MMOL/L — HIGH (ref 3.5–5)
POTASSIUM SERPL-MCNC: 5.8 MMOL/L — HIGH (ref 3.5–5)
POTASSIUM SERPL-SCNC: 5.1 MMOL/L — HIGH (ref 3.5–5)
POTASSIUM SERPL-SCNC: 5.8 MMOL/L — HIGH (ref 3.5–5)
POTASSIUM UR-SCNC: 20 MMOL/L — SIGNIFICANT CHANGE UP
PROT ?TM UR-MCNC: 52 MG/DLG/24H — SIGNIFICANT CHANGE UP
PROT ?TM UR-MCNC: 52 MG/DLG/24H — SIGNIFICANT CHANGE UP
PROT SERPL-MCNC: 5.7 G/DL — LOW (ref 6–8)
PROT UR-MCNC: ABNORMAL
PROT/CREAT UR-RTO: 0.9 RATIO — HIGH (ref 0–0.2)
RBC # BLD: 3.08 M/UL — LOW (ref 4.2–5.4)
RBC # FLD: 13.7 % — SIGNIFICANT CHANGE UP (ref 11.5–14.5)
RBC CASTS # UR COMP ASSIST: 0 /HPF — SIGNIFICANT CHANGE UP (ref 0–4)
SODIUM SERPL-SCNC: 128 MMOL/L — LOW (ref 135–146)
SODIUM SERPL-SCNC: 129 MMOL/L — LOW (ref 135–146)
SP GR SPEC: 1.01 — LOW (ref 1.01–1.03)
UROBILINOGEN FLD QL: SIGNIFICANT CHANGE UP
UUN UR-MCNC: 189 MG/DL — SIGNIFICANT CHANGE UP
WBC # BLD: 5.83 K/UL — SIGNIFICANT CHANGE UP (ref 4.8–10.8)
WBC # FLD AUTO: 5.83 K/UL — SIGNIFICANT CHANGE UP (ref 4.8–10.8)
WBC UR QL: 473 /HPF — HIGH (ref 0–5)

## 2022-04-27 PROCEDURE — 73030 X-RAY EXAM OF SHOULDER: CPT | Mod: 26,RT

## 2022-04-27 PROCEDURE — 73502 X-RAY EXAM HIP UNI 2-3 VIEWS: CPT | Mod: 26,RT

## 2022-04-27 PROCEDURE — 71045 X-RAY EXAM CHEST 1 VIEW: CPT | Mod: 26

## 2022-04-27 PROCEDURE — 99222 1ST HOSP IP/OBS MODERATE 55: CPT

## 2022-04-27 PROCEDURE — 99233 SBSQ HOSP IP/OBS HIGH 50: CPT

## 2022-04-27 RX ORDER — SODIUM ZIRCONIUM CYCLOSILICATE 10 G/10G
10 POWDER, FOR SUSPENSION ORAL DAILY
Refills: 0 | Status: DISCONTINUED | OUTPATIENT
Start: 2022-04-27 | End: 2022-04-28

## 2022-04-27 RX ORDER — INSULIN HUMAN 100 [IU]/ML
10 INJECTION, SOLUTION SUBCUTANEOUS ONCE
Refills: 0 | Status: COMPLETED | OUTPATIENT
Start: 2022-04-27 | End: 2022-04-27

## 2022-04-27 RX ORDER — SODIUM BICARBONATE 1 MEQ/ML
1300 SYRINGE (ML) INTRAVENOUS EVERY 8 HOURS
Refills: 0 | Status: DISCONTINUED | OUTPATIENT
Start: 2022-04-27 | End: 2022-04-28

## 2022-04-27 RX ORDER — DEXTROSE 50 % IN WATER 50 %
50 SYRINGE (ML) INTRAVENOUS ONCE
Refills: 0 | Status: COMPLETED | OUTPATIENT
Start: 2022-04-27 | End: 2022-04-27

## 2022-04-27 RX ORDER — DEXTROSE 10 % IN WATER 10 %
250 INTRAVENOUS SOLUTION INTRAVENOUS
Refills: 0 | Status: DISCONTINUED | OUTPATIENT
Start: 2022-04-27 | End: 2022-04-29

## 2022-04-27 RX ADMIN — APIXABAN 2.5 MILLIGRAM(S): 2.5 TABLET, FILM COATED ORAL at 05:30

## 2022-04-27 RX ADMIN — Medication 650 MILLIGRAM(S): at 21:54

## 2022-04-27 RX ADMIN — ISOSORBIDE MONONITRATE 30 MILLIGRAM(S): 60 TABLET, EXTENDED RELEASE ORAL at 12:11

## 2022-04-27 RX ADMIN — METHOCARBAMOL 500 MILLIGRAM(S): 500 TABLET, FILM COATED ORAL at 05:30

## 2022-04-27 RX ADMIN — METHOCARBAMOL 500 MILLIGRAM(S): 500 TABLET, FILM COATED ORAL at 13:06

## 2022-04-27 RX ADMIN — ATORVASTATIN CALCIUM 20 MILLIGRAM(S): 80 TABLET, FILM COATED ORAL at 21:53

## 2022-04-27 RX ADMIN — Medication 20 MILLIGRAM(S): at 05:30

## 2022-04-27 RX ADMIN — LATANOPROST 1 DROP(S): 0.05 SOLUTION/ DROPS OPHTHALMIC; TOPICAL at 00:18

## 2022-04-27 RX ADMIN — Medication 1300 MILLIGRAM(S): at 21:53

## 2022-04-27 RX ADMIN — Medication 25 MILLIGRAM(S): at 13:06

## 2022-04-27 RX ADMIN — Medication 25 MILLIGRAM(S): at 21:53

## 2022-04-27 RX ADMIN — APIXABAN 2.5 MILLIGRAM(S): 2.5 TABLET, FILM COATED ORAL at 17:24

## 2022-04-27 RX ADMIN — Medication 250 MILLILITER(S): at 12:47

## 2022-04-27 RX ADMIN — INSULIN HUMAN 10 UNIT(S): 100 INJECTION, SOLUTION SUBCUTANEOUS at 12:10

## 2022-04-27 RX ADMIN — METHOCARBAMOL 500 MILLIGRAM(S): 500 TABLET, FILM COATED ORAL at 21:53

## 2022-04-27 RX ADMIN — Medication 25 MILLIGRAM(S): at 05:30

## 2022-04-27 RX ADMIN — SODIUM ZIRCONIUM CYCLOSILICATE 10 GRAM(S): 10 POWDER, FOR SUSPENSION ORAL at 13:06

## 2022-04-27 RX ADMIN — SERTRALINE 50 MILLIGRAM(S): 25 TABLET, FILM COATED ORAL at 12:11

## 2022-04-27 RX ADMIN — LATANOPROST 1 DROP(S): 0.05 SOLUTION/ DROPS OPHTHALMIC; TOPICAL at 22:46

## 2022-04-27 NOTE — CONSULT NOTE ADULT - ASSESSMENT
Mrs. Marrufo 86 year old female (jehovahs witness) with PMX HFrEF 25% s/p AICD, pulm HTN, provoked PE/DVT on eliquis, DLD, glaucoma  CKD  presents to ED for "right shoulder pain, radiating to elbow, radiating to hip and radiating to toes"     Unilateral right hip, shoulder and elbow pain  unlikely RA due to distribution of pain  Please send Anti-CCP, RF, OSIEL, dsDNA   to be discussed with attending  Mrs. Marrufo 86 year old female (jehovahs witness) with PMX HFrEF 25% s/p AICD, pulm HTN, provoked PE/DVT on eliquis, DLD, glaucoma  CKD  presents to ED for "right shoulder pain, radiating to elbow, radiating to hip and radiating to toes"     Unilateral right hip, shoulder and elbow pain  Patient has positive RF and OSIEL as of 12/2020  to d/w attending Mrs. Marrufo 86 year old female (jehovahs witness) with PMX HFrEF 25% s/p AICD, pulm HTN, provoked PE/DVT on eliquis, DLD, glaucoma  CKD  presents to ED for "right shoulder pain, radiating to elbow, radiating to hip and radiating to toes"     Unilateral right hip, shoulder and elbow pain  Patient has positive RF and OSIEL as of 12/2020  Please send for ESR, CRP, and CCP Ab.  If inflammatory markers elevated-->can start short course regimen of low dose prednisone  discussed with attending

## 2022-04-27 NOTE — PROGRESS NOTE ADULT - SUBJECTIVE AND OBJECTIVE BOX
RACHEL SUMMERS  86y Female    CHIEF COMPLAINT:    Patient is a 86y old  Female who presents with a chief complaint of right lower leg pain/social admit (2022 15:28)      INTERVAL HPI/OVERNIGHT EVENTS:    Patient seen and examined.    ROS: All other systems are negative.    Vital Signs:    T(F): 97.4 (22 @ 05:18), Max: 98 (22 @ 22:39)  HR: 62 (22 @ 05:18) (62 - 63)  BP: 152/69 (22 @ 05:18) (144/72 - 169/76)  RR: 18 (22 @ 05:18) (18 - 18)  SpO2: 98% (22 @ 17:36) (98% - 98%)  I&O's Summary    2022 07:01  -  2022 07:00  --------------------------------------------------------  IN: 0 mL / OUT: 375 mL / NET: -375 mL      Daily Height in cm: 160.02 (2022 22:39)    Daily Weight in k.7 (2022 05:18)  CAPILLARY BLOOD GLUCOSE      POCT Blood Glucose.: 97 mg/dL (2022 18:30)  POCT Blood Glucose.: 102 mg/dL (2022 14:23)  POCT Blood Glucose.: 105 mg/dL (2022 12:05)  POCT Blood Glucose.: 142 mg/dL (2022 10:00)      PHYSICAL EXAM:    GENERAL:  NAD  SKIN: No rashes or lesions  HENT: Atraumatic. Normocephalic. PERRL. Moist membranes.  NECK: Supple, No JVD. No lymphadenopathy.  PULMONARY: CTA B/L. No wheezing. No rales  CVS: Normal S1, S2. Rate and Rhythm are regular. No murmurs.  ABDOMEN/GI: Soft, Nontender, Nondistended; BS present  EXTREMITIES: Peripheral pulses intact. No edema B/L LE.  NEUROLOGIC:  No motor or sensory deficit.  PSYCH: Alert & oriented x 3    Consultant(s) Notes Reviewed:  [x ] YES  [ ] NO  Care Discussed with Consultants/Other Providers [ x] YES  [ ] NO    EKG reviewed  Telemetry reviewed    LABS:                        9.4    6.21  )-----------( 233      ( 2022 07:30 )             29.5         135  |  100  |  56<H>  ----------------------------<  77  5.4<H>   |  23  |  3.8<H>    Ca    10.3<H>      2022 13:02  Phos  4.1       Mg     2.1         TPro  6.3  /  Alb  3.8  /  TBili  <0.2  /  DBili  x   /  AST  26  /  ALT  9   /  AlkPhos  69          Trop 0.02, CKMB --, CK --, 22 @ 21:19  Trop --, CKMB --, , 22 @ 16:24        RADIOLOGY & ADDITIONAL TESTS:      Imaging or report Personally Reviewed:  [ ] YES  [ ] NO    Medications:  Standing  apixaban 2.5 milliGRAM(s) Oral two times a day  atorvastatin 20 milliGRAM(s) Oral at bedtime  chlorhexidine 4% Liquid 1 Application(s) Topical <User Schedule>  furosemide    Tablet 20 milliGRAM(s) Oral daily  hydrALAZINE 25 milliGRAM(s) Oral three times a day  isosorbide   mononitrate ER Tablet (IMDUR) 30 milliGRAM(s) Oral daily  latanoprost 0.005% Ophthalmic Solution 1 Drop(s) Both EYES at bedtime  methocarbamol 500 milliGRAM(s) Oral every 8 hours  metoprolol succinate ER 25 milliGRAM(s) Oral daily  sertraline 50 milliGRAM(s) Oral daily  timolol 0.5% Solution 1 Drop(s) Both EYES two times a day    PRN Meds  acetaminophen     Tablet .. 650 milliGRAM(s) Oral every 6 hours PRN  aluminum hydroxide/magnesium hydroxide/simethicone Suspension 30 milliLiter(s) Oral every 4 hours PRN  melatonin 3 milliGRAM(s) Oral at bedtime PRN  ondansetron Injectable 4 milliGRAM(s) IV Push every 8 hours PRN      Case discussed with resident    Care discussed with pt/family           RACHEL SUMMERS  86y Female    CHIEF COMPLAINT:    Patient is a 86y old  Female who presents with a chief complaint of right lower leg pain/social admit (2022 15:28)      INTERVAL HPI/OVERNIGHT EVENTS:    Patient seen and examined. C/O pain in the R shoulder, hip and foot. No cp or sob. K is elevated on blood w/u    ROS: All other systems are negative.    Vital Signs:    T(F): 97.4 (22 @ 05:18), Max: 98 (22 @ 22:39)  HR: 62 (22 @ 05:18) (62 - 63)  BP: 152/69 (22 @ 05:18) (144/72 - 169/76)  RR: 18 (22 @ 05:18) (18 - 18)  SpO2: 98% (22 @ 17:36) (98% - 98%)  I&O's Summary    2022 07:01  -  2022 07:00  --------------------------------------------------------  IN: 0 mL / OUT: 375 mL / NET: -375 mL      Daily Height in cm: 160.02 (2022 22:39)    Daily Weight in k.7 (2022 05:18)  CAPILLARY BLOOD GLUCOSE      POCT Blood Glucose.: 97 mg/dL (2022 18:30)  POCT Blood Glucose.: 102 mg/dL (2022 14:23)  POCT Blood Glucose.: 105 mg/dL (2022 12:05)  POCT Blood Glucose.: 142 mg/dL (2022 10:00)      PHYSICAL EXAM:    GENERAL:  NAD  SKIN: No rashes or lesions  HENT: Atraumatic. Normocephalic. PERRL. Moist membranes.  NECK: Supple, No JVD. No lymphadenopathy.  PULMONARY: CTA B/L. No wheezing. No rales  CVS: Normal S1, S2. Rate and Rhythm are regular. No murmurs.  ABDOMEN/GI: Soft, Nontender, Nondistended; BS present  EXTREMITIES: Peripheral pulses intact. No edema B/L LE.  NEUROLOGIC:  No motor or sensory deficit.  PSYCH: Alert & oriented x 3    Consultant(s) Notes Reviewed:  [x ] YES  [ ] NO  Care Discussed with Consultants/Other Providers [ x] YES  [ ] NO    EKG reviewed  Telemetry reviewed    LABS:                        9.4    6.21  )-----------( 233      ( 2022 07:30 )             29.5         135  |  100  |  56<H>  ----------------------------<  77   Creatinine Trend: 3.4<--, 3.8<--, 3.8<--, 3.8<--, 3.8<--, 3.9<--  5.4<H>   |  23  |  3.8<H>    Ca    10.3<H>      2022 13:02  Phos  4.1       Mg     2.1         TPro  6.3  /  Alb  3.8  /  TBili  <0.2  /  DBili  x   /  AST  26  /  ALT  9   /  AlkPhos  69          Trop 0.02, CKMB --, CK --, 22 @ 21:19  Trop --, CKMB --, , 22 @ 16:24        RADIOLOGY & ADDITIONAL TESTS:      Imaging or report Personally Reviewed:  [ ] YES  [ ] NO    Medications:  Standing  apixaban 2.5 milliGRAM(s) Oral two times a day  atorvastatin 20 milliGRAM(s) Oral at bedtime  chlorhexidine 4% Liquid 1 Application(s) Topical <User Schedule>  furosemide    Tablet 20 milliGRAM(s) Oral daily  hydrALAZINE 25 milliGRAM(s) Oral three times a day  isosorbide   mononitrate ER Tablet (IMDUR) 30 milliGRAM(s) Oral daily  latanoprost 0.005% Ophthalmic Solution 1 Drop(s) Both EYES at bedtime  methocarbamol 500 milliGRAM(s) Oral every 8 hours  metoprolol succinate ER 25 milliGRAM(s) Oral daily  sertraline 50 milliGRAM(s) Oral daily  timolol 0.5% Solution 1 Drop(s) Both EYES two times a day    PRN Meds  acetaminophen     Tablet .. 650 milliGRAM(s) Oral every 6 hours PRN  aluminum hydroxide/magnesium hydroxide/simethicone Suspension 30 milliLiter(s) Oral every 4 hours PRN  melatonin 3 milliGRAM(s) Oral at bedtime PRN  ondansetron Injectable 4 milliGRAM(s) IV Push every 8 hours PRN      Case discussed with resident    Care discussed with pt/family

## 2022-04-27 NOTE — CONSULT NOTE ADULT - SUBJECTIVE AND OBJECTIVE BOX
Patient is a 86y old  Female who presents with a chief complaint of right lower leg pain/social admit (2022 13:15)    HPI:  Mrs. Marrufo 86 year old female (jehovahs witness) with PMX HFrEF 25% s/p AICD, pulm HTN, provoked PE/DVT on eliquis, DLD, glaucoma  CKD  presents to ED for "right shoulder pain, radiating to elbow, radiating to hip and radiating to toes" for one week which was worsening since last night. Patient reports she had symptoms of such in the past; does not recall what she was diagnosed with. Patient reports pain worsening on ambulations, and having a great difficult ambulating. Usually walks with assistance and a cane which has worsened. Patient reports pain improves on rest. Patient denies headaches, vision changes, sob, dizziness, chest pain.   Of note patient hospitalized multiple times in past; recently admitted 3/28/2022 for atypical chest pain most likely musculoskeletal no ekg changes and advised to follow up with cardiologist (Dr. Mckinney) outpatient.     In the ED, vitals stable. Labs significant for Na 131, K 6 (hemolyzed), creat 3.8 (previously 2.7), trop 0.02 (previously 0.02 1 month ago). Patient was given 10 units regular insulin, dextrose, calcium gluconate, morphine, Tylenol zofran and 1 L bolus of NS in ED. Patient currently admitted to medicine for further management.  (2022 00:22)      MEDICATIONS  (STANDING):  apixaban 2.5 milliGRAM(s) Oral two times a day  atorvastatin 20 milliGRAM(s) Oral at bedtime  chlorhexidine 4% Liquid 1 Application(s) Topical <User Schedule>  dextrose 10%. 250 milliLiter(s) (250 mL/Hr) IV Continuous <Continuous>  furosemide    Tablet 20 milliGRAM(s) Oral daily  hydrALAZINE 25 milliGRAM(s) Oral three times a day  isosorbide   mononitrate ER Tablet (IMDUR) 30 milliGRAM(s) Oral daily  latanoprost 0.005% Ophthalmic Solution 1 Drop(s) Both EYES at bedtime  methocarbamol 500 milliGRAM(s) Oral every 8 hours  metoprolol succinate ER 25 milliGRAM(s) Oral daily  sertraline 50 milliGRAM(s) Oral daily  sodium zirconium cyclosilicate 10 Gram(s) Oral daily  timolol 0.5% Solution 1 Drop(s) Both EYES two times a day    MEDICATIONS  (PRN):  acetaminophen     Tablet .. 650 milliGRAM(s) Oral every 6 hours PRN Temp greater or equal to 38C (100.4F), Mild Pain (1 - 3)  aluminum hydroxide/magnesium hydroxide/simethicone Suspension 30 milliLiter(s) Oral every 4 hours PRN Dyspepsia  melatonin 3 milliGRAM(s) Oral at bedtime PRN Insomnia  ondansetron Injectable 4 milliGRAM(s) IV Push every 8 hours PRN Nausea and/or Vomiting    Allergies    Keflex (Swelling)    Intolerances      PPD:  Vaccines:    PAST MEDICAL & SURGICAL HISTORY:  Chronic kidney disease    Pacemaker    Hypertension    Gout    Diverticulitis  sigmoid    Diastolic heart failure    Rheumatoid arthritis    DVT, lower extremity    Artificial pacemaker    History of appendectomy    History of tonsillectomy    History of hysterectomy      Growth & Development:    SOCIAL HISTORY:  School Performance/Attendance:  [] Animal/Insect Exposure:      REVIEW OF SYSTEMS:    CONSTITUTIONAL: No weakness, fevers or chills  EYES/ENT: No visual changes;  No vertigo or throat pain   NECK: No pain or stiffness  RESPIRATORY: No cough, wheezing, hemoptysis; No shortness of breath  CARDIOVASCULAR: No chest pain or palpitations  GASTROINTESTINAL: No abdominal or epigastric pain. No nausea, vomiting, or hematemesis; No diarrhea or constipation. No melena or hematochezia.  GENITOURINARY: No dysuria, frequency or hematuria  NEUROLOGICAL: No numbness or weakness  SKIN: No itching, rashes     Vital Signs Last 24 Hrs  T(C): 36.3 (2022 05:18), Max: 36.7 (2022 22:39)  T(F): 97.4 (2022 05:18), Max: 98 (2022 22:39)  HR: 62 (2022 05:18) (62 - 63)  BP: 152/69 (2022 05:18) (144/72 - 169/76)  BP(mean): --  RR: 18 (2022 05:18) (18 - 18)  SpO2: 98% (2022 17:36) (98% - 98%)  Daily Height in cm: 160.02 (2022 22:39)    Daily Weight in k.7 (2022 05:18)    PHYSICAL EXAM:  GENERAL: NAD, lying in bed comfortably  HEAD:  Atraumatic, Normocephalic  EYES: EOMI, PERRLA, conjunctiva and sclera clear  ENT: Moist mucous membranes  NECK: Supple, No JVD  CHEST/LUNG: Clear to auscultation bilaterally; No rales, rhonchi, wheezing, or rubs. Unlabored respirations  HEART: Regular rate and rhythm; No murmurs, rubs, or gallops  ABDOMEN: Bowel sounds present; Soft, Nontender, Nondistended. No hepatomegally  EXTREMITIES:  2+ Peripheral Pulses, brisk capillary refill. No clubbing, cyanosis, or edema  NERVOUS SYSTEM:  Alert & Oriented X3, speech clear. No deficits   MSK: FROM all 4 extremities, full and equal strength  SKIN: No rashes or lesions             8.9    5.83  )-----------( 196      ( 2022 04:30 )             28.2     04-    129<L>  |  96<L>  |  56<H>  ----------------------------<  102<H>  5.8<H>   |  17  |  3.4<H>    Ca    9.2      2022 04:30  Phos  4.1     04-  Mg     2.1     -    TPro  5.7<L>  /  Alb  3.2<L>  /  TBili  <0.2  /  DBili  x   /  AST  29  /  ALT  10  /  AlkPhos  64        Urinalysis Basic - ( 2022 11:30 )    Color: Light Yellow / Appearance: Slightly Turbid / S.007 / pH: x  Gluc: x / Ketone: Negative  / Bili: Negative / Urobili: <2 mg/dL   Blood: x / Protein: 30 mg/dL / Nitrite: Positive   Leuk Esterase: Large / RBC: 0 /HPF /  /HPF   Sq Epi: x / Non Sq Epi: 1 /HPF / Bacteria: Few     Patient is a 86y old  Female who presents with a chief complaint of right lower leg pain/social admit (2022 13:15)    HPI:  Mrs. Marrufo 86 year old female (jehovahs witness) with PMX HFrEF 25% s/p AICD, pulm HTN, provoked PE/DVT on eliquis, DLD, glaucoma  CKD  presents to ED for "right shoulder pain, radiating to elbow, radiating to hip and radiating to toes" for one week which was worsening since last night. Patient reports she had symptoms of such in the past; does not recall what she was diagnosed with. Patient reports pain worsening on ambulations, and having a great difficult ambulating. Usually walks with assistance and a cane which has worsened. Patient reports pain improves on rest. Patient denies headaches, vision changes, sob, dizziness, chest pain.   Of note patient hospitalized multiple times in past; recently admitted 3/28/2022 for atypical chest pain most likely musculoskeletal no ekg changes and advised to follow up with cardiologist (Dr. Mckinney) outpatient.     In the ED, vitals stable. Labs significant for Na 131, K 6 (hemolyzed), creat 3.8 (previously 2.7), trop 0.02 (previously 0.02 1 month ago). Patient was given 10 units regular insulin, dextrose, calcium gluconate, morphine, Tylenol zofran and 1 L bolus of NS in ED. Patient currently admitted to medicine for further management.  (2022 00:22)      MEDICATIONS  (STANDING):  apixaban 2.5 milliGRAM(s) Oral two times a day  atorvastatin 20 milliGRAM(s) Oral at bedtime  chlorhexidine 4% Liquid 1 Application(s) Topical <User Schedule>  dextrose 10%. 250 milliLiter(s) (250 mL/Hr) IV Continuous <Continuous>  furosemide    Tablet 20 milliGRAM(s) Oral daily  hydrALAZINE 25 milliGRAM(s) Oral three times a day  isosorbide   mononitrate ER Tablet (IMDUR) 30 milliGRAM(s) Oral daily  latanoprost 0.005% Ophthalmic Solution 1 Drop(s) Both EYES at bedtime  methocarbamol 500 milliGRAM(s) Oral every 8 hours  metoprolol succinate ER 25 milliGRAM(s) Oral daily  sertraline 50 milliGRAM(s) Oral daily  sodium zirconium cyclosilicate 10 Gram(s) Oral daily  timolol 0.5% Solution 1 Drop(s) Both EYES two times a day    MEDICATIONS  (PRN):  acetaminophen     Tablet .. 650 milliGRAM(s) Oral every 6 hours PRN Temp greater or equal to 38C (100.4F), Mild Pain (1 - 3)  aluminum hydroxide/magnesium hydroxide/simethicone Suspension 30 milliLiter(s) Oral every 4 hours PRN Dyspepsia  melatonin 3 milliGRAM(s) Oral at bedtime PRN Insomnia  ondansetron Injectable 4 milliGRAM(s) IV Push every 8 hours PRN Nausea and/or Vomiting    Allergies    Keflex (Swelling)    Intolerances      PPD:  Vaccines:    PAST MEDICAL & SURGICAL HISTORY:  Chronic kidney disease    Pacemaker    Hypertension    Gout    Diverticulitis  sigmoid    Diastolic heart failure    Rheumatoid arthritis    DVT, lower extremity    Artificial pacemaker    History of appendectomy    History of tonsillectomy    History of hysterectomy      Growth & Development:    SOCIAL HISTORY:  School Performance/Attendance:  [] Animal/Insect Exposure:      REVIEW OF SYSTEMS:    CONSTITUTIONAL: No weakness, fevers or chills  EYES/ENT: No visual changes;  No vertigo or throat pain   NECK: No pain or stiffness  RESPIRATORY: No cough, wheezing, hemoptysis; No shortness of breath  CARDIOVASCULAR: No chest pain or palpitations  GASTROINTESTINAL: No abdominal or epigastric pain. No nausea, vomiting, or hematemesis; No diarrhea or constipation. No melena or hematochezia.  GENITOURINARY: No dysuria, frequency or hematuria  NEUROLOGICAL: No numbness or weakness  SKIN: No itching, rashes  MSK: +Right shoulder pain, +Right hip pain      Vital Signs Last 24 Hrs  T(C): 36.3 (2022 05:18), Max: 36.7 (2022 22:39)  T(F): 97.4 (2022 05:18), Max: 98 (2022 22:39)  HR: 62 (2022 05:18) (62 - 63)  BP: 152/69 (2022 05:18) (144/72 - 169/76)  BP(mean): --  RR: 18 (2022 05:18) (18 - 18)  SpO2: 98% (2022 17:36) (98% - 98%)  Daily Height in cm: 160.02 (2022 22:39)    Daily Weight in k.7 (2022 05:18)    PHYSICAL EXAM:  GENERAL: NAD, lying in bed comfortably  HEAD:  Atraumatic, Normocephalic  NECK: Supple, No JVD  CHEST/LUNG: Clear to auscultation bilaterally; No rales, rhonchi, wheezing, or rubs. Unlabored respirations  HEART: Regular rate and rhythm; No murmurs, rubs, or gallops  ABDOMEN: Bowel sounds present; Soft, Nontender, Nondistended. No hepatomegally  EXTREMITIES:  2+ Peripheral Pulses, brisk capillary refill. No clubbing, cyanosis, or edema  NERVOUS SYSTEM:  Alert & Oriented X3, speech clear. No deficits   MSK: Limited ROM of bilateral knees, resistance to knee flexion particularly, pain elicited upon abduction of right shoulder.              8.9    5.83  )-----------( 196      ( 2022 04:30 )             28.2     04    129<L>  |  96<L>  |  56<H>  ----------------------------<  102<H>  5.8<H>   |  17  |  3.4<H>    Ca    9.2      2022 04:30  Phos  4.1     -  Mg     2.1         TPro  5.7<L>  /  Alb  3.2<L>  /  TBili  <0.2  /  DBili  x   /  AST  29  /  ALT  10  /  AlkPhos  64  27      Urinalysis Basic - ( 2022 11:30 )    Color: Light Yellow / Appearance: Slightly Turbid / S.007 / pH: x  Gluc: x / Ketone: Negative  / Bili: Negative / Urobili: <2 mg/dL   Blood: x / Protein: 30 mg/dL / Nitrite: Positive   Leuk Esterase: Large / RBC: 0 /HPF /  /HPF   Sq Epi: x / Non Sq Epi: 1 /HPF / Bacteria: Few

## 2022-04-27 NOTE — OCCUPATIONAL THERAPY INITIAL EVALUATION ADULT - GENERAL OBSERVATIONS, REHAB EVAL
Attempted to see pt for OT eval. Pt decline stating she has pain in right side of abdomen. Pt states she just got up with PT and does not want to get up again.

## 2022-04-27 NOTE — PHYSICAL THERAPY INITIAL EVALUATION ADULT - PATIENT PROFILE REVIEW, REHAB EVAL
spoke to DR lunsford  HOLD PT today/yes
Chart reviewed, spoke with Dr Nichols, [phone], pt is cleared for PT today/yes

## 2022-04-27 NOTE — PROGRESS NOTE ADULT - SUBJECTIVE AND OBJECTIVE BOX
RACHEL SUMMERS 86y Female  MRN#: [id number]   Hospital Day: 2d    SUBJECTIVE  Patient is a 86y old Female who presents with a chief complaint of right lower leg pain/social admit (26 Apr 2022 15:28)  Currently admitted to medicine with the primary diagnosis of Hyperkalemia      INTERVAL HPI AND OVERNIGHT EVENTS:  Patient was examined and seen at bedside. This morning she is resting comfortably in bed. No issues or overnight events.    OBJECTIVE  PAST MEDICAL & SURGICAL HISTORY  Chronic kidney disease    Pacemaker    Hypertension    Gout    Diverticulitis  sigmoid    Diastolic heart failure    Rheumatoid arthritis    DVT, lower extremity    Artificial pacemaker    History of appendectomy    History of tonsillectomy    History of hysterectomy      ALLERGIES:  Keflex (Swelling)    MEDICATIONS:  STANDING MEDICATIONS  apixaban 2.5 milliGRAM(s) Oral two times a day  atorvastatin 20 milliGRAM(s) Oral at bedtime  chlorhexidine 4% Liquid 1 Application(s) Topical <User Schedule>  furosemide    Tablet 20 milliGRAM(s) Oral daily  hydrALAZINE 25 milliGRAM(s) Oral three times a day  isosorbide   mononitrate ER Tablet (IMDUR) 30 milliGRAM(s) Oral daily  latanoprost 0.005% Ophthalmic Solution 1 Drop(s) Both EYES at bedtime  methocarbamol 500 milliGRAM(s) Oral every 8 hours  metoprolol succinate ER 25 milliGRAM(s) Oral daily  sertraline 50 milliGRAM(s) Oral daily  timolol 0.5% Solution 1 Drop(s) Both EYES two times a day    PRN MEDICATIONS  acetaminophen     Tablet .. 650 milliGRAM(s) Oral every 6 hours PRN  aluminum hydroxide/magnesium hydroxide/simethicone Suspension 30 milliLiter(s) Oral every 4 hours PRN  melatonin 3 milliGRAM(s) Oral at bedtime PRN  ondansetron Injectable 4 milliGRAM(s) IV Push every 8 hours PRN      VITAL SIGNS: Last 24 Hours  T(C): 36.3 (27 Apr 2022 05:18), Max: 36.7 (26 Apr 2022 22:39)  T(F): 97.4 (27 Apr 2022 05:18), Max: 98 (26 Apr 2022 22:39)  HR: 62 (27 Apr 2022 05:18) (62 - 65)  BP: 152/69 (27 Apr 2022 05:18) (134/63 - 169/76)  BP(mean): --  RR: 18 (27 Apr 2022 05:18) (18 - 18)  SpO2: 98% (26 Apr 2022 17:36) (98% - 98%)    LABS:                        9.4    6.21  )-----------( 233      ( 26 Apr 2022 07:30 )             29.5     04-26    135  |  100  |  56<H>  ----------------------------<  77  5.4<H>   |  23  |  3.8<H>    Ca    10.3<H>      26 Apr 2022 13:02  Phos  4.1     04-26  Mg     2.1     04-26    TPro  6.3  /  Alb  3.8  /  TBili  <0.2  /  DBili  x   /  AST  26  /  ALT  9   /  AlkPhos  69  04-26              CARDIAC MARKERS ( 25 Apr 2022 21:19 )  x     / 0.02 ng/mL / x     / x     / x      CARDIAC MARKERS ( 25 Apr 2022 16:24 )  x     / x     / 124 U/L / x     / x          RADIOLOGY:      PHYSICAL EXAM:  CONSTITUTIONAL: No acute distress, well-developed, well-groomed, AAOx3  HEAD: Atraumatic, normocephalic  EYES: EOM intact, PERRLA, conjunctiva and sclera clear  ENT: Supple, no masses, no thyromegaly, no bruits, no JVD; moist mucous membranes  PULMONARY: Clear to auscultation bilaterally; no wheezes, rales, or rhonchi  CARDIOVASCULAR: Regular rate and rhythm; no murmurs, rubs, or gallops  GASTROINTESTINAL: Soft, non-tender, non-distended; bowel sounds present  MUSCULOSKELETAL: 2+ peripheral pulses; no clubbing, no cyanosis, no edema  NEUROLOGY: non-focal  SKIN: No rashes or lesions; warm and dry   RACHEL SUMMERS 86y Female  MRN#: [id number]   Hospital Day: 2d    SUBJECTIVE  Patient is a 86y old Female who presents with a chief complaint of right lower leg pain/social admit (26 Apr 2022 15:28)  Currently admitted to medicine with the primary diagnosis of Hyperkalemia      INTERVAL HPI AND OVERNIGHT EVENTS:  Patient was examined and seen at bedside. This morning she is resting comfortably in bed. No issues or overnight events.    OBJECTIVE  PAST MEDICAL & SURGICAL HISTORY  Chronic kidney disease    Pacemaker    Hypertension    Gout    Diverticulitis  sigmoid    Diastolic heart failure    Rheumatoid arthritis    DVT, lower extremity    Artificial pacemaker    History of appendectomy    History of tonsillectomy    History of hysterectomy      ALLERGIES:  Keflex (Swelling)    MEDICATIONS:  STANDING MEDICATIONS  apixaban 2.5 milliGRAM(s) Oral two times a day  atorvastatin 20 milliGRAM(s) Oral at bedtime  chlorhexidine 4% Liquid 1 Application(s) Topical <User Schedule>  furosemide    Tablet 20 milliGRAM(s) Oral daily  hydrALAZINE 25 milliGRAM(s) Oral three times a day  isosorbide   mononitrate ER Tablet (IMDUR) 30 milliGRAM(s) Oral daily  latanoprost 0.005% Ophthalmic Solution 1 Drop(s) Both EYES at bedtime  methocarbamol 500 milliGRAM(s) Oral every 8 hours  metoprolol succinate ER 25 milliGRAM(s) Oral daily  sertraline 50 milliGRAM(s) Oral daily  timolol 0.5% Solution 1 Drop(s) Both EYES two times a day    PRN MEDICATIONS  acetaminophen     Tablet .. 650 milliGRAM(s) Oral every 6 hours PRN  aluminum hydroxide/magnesium hydroxide/simethicone Suspension 30 milliLiter(s) Oral every 4 hours PRN  melatonin 3 milliGRAM(s) Oral at bedtime PRN  ondansetron Injectable 4 milliGRAM(s) IV Push every 8 hours PRN      VITAL SIGNS: Last 24 Hours  T(C): 36.3 (27 Apr 2022 05:18), Max: 36.7 (26 Apr 2022 22:39)  T(F): 97.4 (27 Apr 2022 05:18), Max: 98 (26 Apr 2022 22:39)  HR: 62 (27 Apr 2022 05:18) (62 - 65)  BP: 152/69 (27 Apr 2022 05:18) (134/63 - 169/76)  BP(mean): --  RR: 18 (27 Apr 2022 05:18) (18 - 18)  SpO2: 98% (26 Apr 2022 17:36) (98% - 98%)    LABS:                        9.4    6.21  )-----------( 233      ( 26 Apr 2022 07:30 )             29.5     04-26    135  |  100  |  56<H>  ----------------------------<  77  5.4<H>   |  23  |  3.8<H>    Ca    10.3<H>      26 Apr 2022 13:02  Phos  4.1     04-26  Mg     2.1     04-26    TPro  6.3  /  Alb  3.8  /  TBili  <0.2  /  DBili  x   /  AST  26  /  ALT  9   /  AlkPhos  69  04-26              CARDIAC MARKERS ( 25 Apr 2022 21:19 )  x     / 0.02 ng/mL / x     / x     / x      CARDIAC MARKERS ( 25 Apr 2022 16:24 )  x     / x     / 124 U/L / x     / x          RADIOLOGY:      PHYSICAL EXAM:  CONSTITUTIONAL: No acute distress, well-developed, well-groomed, AAOx3  HEAD: Atraumatic, normocephalic  EYES: EOM intact, PERRLA, conjunctiva and sclera clear  ENT: Supple, no masses, no thyromegaly, no bruits, no JVD; moist mucous membranes  PULMONARY: Clear to auscultation bilaterally; no wheezes, rales, or rhonchi  CARDIOVASCULAR: Regular rate and rhythm; no murmurs, rubs, or gallops  GASTROINTESTINAL: Soft, non-tender, non-distended; bowel sounds present  MUSCULOSKELETAL: 2+ peripheral pulses; no clubbing, no cyanosis, no edema. R hip tender to palpation   NEUROLOGY: non-focal, moves all extremitites   SKIN: No rashes or lesions; warm and dry. Noted swelling/nodules in b/l hands

## 2022-04-27 NOTE — PHYSICAL THERAPY INITIAL EVALUATION ADULT - GENERAL OBSERVATIONS, REHAB EVAL
[id number] Patient encountered semi meza in bed + IV , RN at bedside with patient + Tel, NAD
10:57-11:40 43 min  pt rec OOB in chair in NAD, pt agreeable to PT

## 2022-04-27 NOTE — CONSULT NOTE ADULT - REASON FOR ADMISSION
right lower leg pain/social admit

## 2022-04-27 NOTE — PROGRESS NOTE ADULT - ASSESSMENT
Mrs. Marrufo 86 year old female (jehovahs witness) with PMX HFrEF 25% s/p AICD, pulm HTN, provoked PE/DVT on eliquis, DLD, glaucoma  CKD  presents to ED for right lower leg pain for one week.    #right hip pain, right leg pain, Right shoulder pain  - c/w with pain control, muscle relaxants   - pt consult  -    - cxr, hip xr, shoulder xr  - may be component of rheumatoid (patient states she has but not previously documented) ->rheum consult     # ADHF / HFrEF, EF 25-30%  - TTE 3/2022: depressed LVEF 20-25%; moderate mitral and tricuspid regurgitation; enlarged LA  - c/w home dose of hydralazine, nitrates and toprol, lasix 20 mg po;  no ACEi/ARB/Entresto  - c/w Atorvastatin 20mg daily  - on aspirin, eliquis; no Plavix  - NO ACE inhibitors/ ARB/ Entresto/ spironolactone since patient can become severely hyperkalemic as per cardiology     #HTN  - Home meds: toprol, hydralazine and lasix  -NO ACE inhibitors/ ARB/ Entresto/ spironolactone since patient can become severely hyperkalemic     # - OMERO on CKD 4  - previous baseline creatinine around 2.6-2.8  - currently 3.8  - OMERO on CKD vs CKD 4 progression  -neprho consult -> Dr. Urrutia   - f/u UA, urine Na, urea, creatinine  - check phos, iPTH  - monitor serum creatinine and electrolytes    #hyperkalemia   - was 7.2 in ED with EKG changes, s/p Stat IV insulin and Calcium gluconate -> 5.4  - this AM now 5.8, will give insulin dextrose and start on standing lokelma once daily for now   - low K diet  - nephro consult    #Hx of PE/DVT  - Hx of provoked PE in the past and soleal vein thrombosis years ago?  - c/w eliquis 2.5 mg bid for now    #HDL  - c/w atorvastatin 20    #Glaucoma  - c/w eye drops    #hx Dysphagia / Anemia  - s/p EGD and colonoscopy in 2020 with non-specific gastritis and Diverticulosis  - Anemia can be due to CKD vs deficiency  - f/u iron panel  -NO BLOOD TRANSFUSIONS AS PER PT WISHES, JEHOVA'S WITNESS    DVT: Eliquis  GI: pantoprazole  Diet: DASH/TLC/Renal diet  Activity: Increase as tolerated  Dispo: Acute     Mrs. Marrufo 86 year old female (jehovahs witness) with PMX HFrEF 25% s/p AICD, pulm HTN, provoked PE/DVT on eliquis, DLD, glaucoma  CKD  presents to ED for right lower leg pain for one week.    #right hip pain, right leg pain, Right shoulder pain  - c/w with pain control, muscle relaxants   - physiatry/PT consult  -    - cxr, hip xr, shoulder xr  - may be component of rheumatoid arthritis (patient states she has but not previously documented) ->rheum consult     # - OMERO on CKD 4  - previous baseline creatinine around 2.6-2.8  - currently 3.8  - OMERO on CKD vs CKD 4 progression  -neprho consult -> Dr. Urrutia   - f/u UA, urine Na, urea, creatinine  - check phos, iPTH  - monitor serum creatinine and electrolytes    #hyperkalemia   - was 7.2 in ED with EKG changes, s/p Stat IV insulin and Calcium gluconate -> 5.4  - this AM now 5.8, will give insulin dextrose and start on standing lokelma once daily for now   - low K diet  - nephro consult    # ADHF / HFrEF, EF 25-30%  - TTE 3/2022: depressed LVEF 20-25%; moderate mitral and tricuspid regurgitation; enlarged LA  - c/w home dose of hydralazine, nitrates and toprol, lasix 20 mg po;  no ACEi/ARB/Entresto  - c/w Atorvastatin 20mg daily  - on aspirin, eliquis; no Plavix  - NO ACE inhibitors/ ARB/ Entresto/ spironolactone since patient can become severely hyperkalemic as per cardiology     #HTN  - Home meds: toprol, hydralazine and lasix  -NO ACE inhibitors/ ARB/ Entresto/ spironolactone since patient can become severely hyperkalemic     #Hx of PE/DVT  - Hx of provoked PE in the past and soleal vein thrombosis years ago?  - c/w eliquis 2.5 mg bid for now    #HDL  - c/w atorvastatin 20    #Glaucoma  - c/w eye drops    #hx Dysphagia / Anemia  - s/p EGD and colonoscopy in 2020 with non-specific gastritis and Diverticulosis  - Anemia can be due to CKD vs deficiency  - f/u iron panel  -NO BLOOD TRANSFUSIONS AS PER PT WISHES, JEHOVA'S WITNESS    DVT: Eliquis  GI: pantoprazole  Diet: DASH/TLC/Renal diet  Activity: Increase as tolerated  Dispo: Acute

## 2022-04-27 NOTE — CONSULT NOTE ADULT - ATTENDING COMMENTS
87 y/o woman with h/o rheumatoid arthritis, gout and calcium pyrophosphate depisition disease, as well as multiple comorbidities including CKD, admitted with acute-on-chronic kidney injury and pain in her R shoulder, R lateral hip/low back and radiating from her posterior R knee to her distal R foot. Pt was previously diagnosed with RA, given methotrexate with no improvement, also tried various pain management treatments including tramadol, Percocet and gabapentin with no improvement. She used to see Dr. Sandy Patterson for her arthritis but has not seen her in several years. She was seen by rheum at [facility] in 2020 for R wrist pain, was noted to have chrondrocalcinosis on a R wrist x-ray as well as significantly elevated inflammatory markers and uric acid of 11. At that time she was thought to have a flare of calcium pyrophosphate deposition disease versus gout, which was treated with high-dose prednisone short taper. Pt says that 4 days ago, she experienced sudden onset of pain in her R shoulder radiating to her R proximal arm, R low back and R posterior knee. She reports having had similar episodes of pain in the past but did not elaborate further. Her pain is currently worst in the R low back. Pt's exam is notable for significant bony hypertrophy with crepitus in the b/l knees, with limited flexion to approx 90 degrees b/l and small effusions b/l, also with b/l ulnar deviation in the hands with dorsal muscle wasting. Pt's shoulder x-ray during this admission demonstrated glenohumeral OA. Prior knee x-rays in 2020 demonstrated severe tricompartmental OA and she also had foot x-rays in 2020 which demonstrated OA. While pt clearly has significant osteoarthritis, it is not entirely clear what is causing her current flare of pain. It is possible that her current pain could be due to osteoarthritis, but given her h/o crystal arthritis, it is worth repeating her uric acid and inflammatory markers. Pt's current symptoms are not suspicious for rheumatoid arthritis. She had a minimally elevated rheumatoid factor in 2020 but this may have been a false positive.  - f/u uric acid, inflammatory markers, and cyclic citrullinated peptide  - If pt's uric acid and/or inflammatory markers are significantly elevated, would consider a short course of prednisone for a possible flare of crystal arthritis

## 2022-04-27 NOTE — PROGRESS NOTE ADULT - ASSESSMENT
86 year old female (jehovahs witness) with PMX HFrEF 25% s/p AICD, pulm HTN, provoked PE/DVT on eliquis, DLD, glaucoma  CKD  presents to ED for "right shoulder pain, radiating to elbow, radiating to hip and radiating to toes" for one week which was worsening since last night.    Hyperkalemia  Hyponatremia  OMERO on CKD-4  Chronic HFrEF S/P AICD  Pulmonary HTN  Chronic DVT on Eliquis 86 year old female (jehovahs witness) with PMX HFrEF 25% s/p AICD, pulm HTN, provoked PE/DVT on eliquis, DLD, glaucoma  CKD  presents to ED for "right shoulder pain, radiating to elbow, radiating to hip and radiating to toes" for one week which was worsening since last night.    Hyperkalemia  Hyponatremia  OMERO on CKD-4  Chronic HFrEF S/P AICD  Pulmonary HTN  Chronic DVT on Eliquis  RA              PLAN:    ·	Cont tele  ·	Hyperkalemia. No ACE-I or ARB  ·	Urine Na is 92  ·	Cr is trending down and K is 5.4 today. If K goes up start on Lowkelama 5 mg po q 8h.   ·	Monitor renal function  ·	Nephrology eval  ·	Renal/bladder US  ·	X-Ray R shoulder and hip  ·	Pain control  ·	Nephrology eval  ·	Cont Eliquis and her other meds    Progress Note Handoff    Pending (specify):  Consults_Nephrology, Rheumatology________, Tests________, Test Results_______, Other_________  Family discussion:  Disposition: Home___/SNF___/Other________/Unknown at this time________    Francicso Pacheco MD  Spectra: [phone]

## 2022-04-27 NOTE — CONSULT NOTE ADULT - SUBJECTIVE AND OBJECTIVE BOX
NEPHROLOGY CONSULTATION NOTE    RACHEL MARRUFO  86y  Female  MRN-[id number]    CC:   Patient is a 86y old  Female who presents with a chief complaint of right lower leg pain/social admit (2022 07:39)      HPI:  Mrs. Marrufo 86 year old female (jehovahs witness) with PMX HFrEF 25% s/p AICD, pulm HTN, provoked PE/DVT on eliquis, DLD, glaucoma  CKD  presents to ED for "right shoulder pain, radiating to elbow, radiating to hip and radiating to toes" for one week which was worsening since last night. Patient reports she had symptoms of such in the past; does not recall what she was diagnosed with. Patient reports pain worsening on ambulations, and having a great difficult ambulating. Usually walks with assistance and a cane which has worsened. Patient reports pain improves on rest. Patient denies headaches, vision changes, sob, dizziness, chest pain.   Of note patient hospitalized multiple times in past; recently admitted 3/28/2022 for atypical chest pain most likely musculoskeletal no ekg changes and advised to follow up with cardiologist (Dr. Mckinney) outpatient.     In the ED, vitals stable. Labs significant for Na 131, K 6 (hemolyzed), creat 3.8 (previously 2.7), trop 0.02 (previously 0.02 1 month ago). Patient was given 10 units regular insulin, dextrose, calcium gluconate, morphine, Tylenol zofran and 1 L bolus of NS in ED. Patient currently admitted to medicine for further management.  (2022 00:22)      PAST MEDICAL & SURGICAL HISTORY:  Chronic kidney disease    Pacemaker    Hypertension    Gout    Diverticulitis  sigmoid    Diastolic heart failure    Rheumatoid arthritis    DVT, lower extremity    Artificial pacemaker    History of appendectomy    History of tonsillectomy    History of hysterectomy      Allergies:  Keflex (Swelling)    Home Medications Reviewed  Hospital Medications:   MEDICATIONS  (STANDING):  apixaban 2.5 milliGRAM(s) Oral two times a day  atorvastatin 20 milliGRAM(s) Oral at bedtime  chlorhexidine 4% Liquid 1 Application(s) Topical <User Schedule>  dextrose 10%. 250 milliLiter(s) (250 mL/Hr) IV Continuous <Continuous>  furosemide    Tablet 20 milliGRAM(s) Oral daily  hydrALAZINE 25 milliGRAM(s) Oral three times a day  isosorbide   mononitrate ER Tablet (IMDUR) 30 milliGRAM(s) Oral daily  latanoprost 0.005% Ophthalmic Solution 1 Drop(s) Both EYES at bedtime  methocarbamol 500 milliGRAM(s) Oral every 8 hours  metoprolol succinate ER 25 milliGRAM(s) Oral daily  sertraline 50 milliGRAM(s) Oral daily  sodium zirconium cyclosilicate 10 Gram(s) Oral daily  timolol 0.5% Solution 1 Drop(s) Both EYES two times a day    MEDICATIONS  (PRN):  acetaminophen     Tablet .. 650 milliGRAM(s) Oral every 6 hours PRN Temp greater or equal to 38C (100.4F), Mild Pain (1 - 3)  aluminum hydroxide/magnesium hydroxide/simethicone Suspension 30 milliLiter(s) Oral every 4 hours PRN Dyspepsia  melatonin 3 milliGRAM(s) Oral at bedtime PRN Insomnia  ondansetron Injectable 4 milliGRAM(s) IV Push every 8 hours PRN Nausea and/or Vomiting    Home Medications:  apixaban 2.5 mg oral tablet: 1 tab(s) orally 2 times a day (06 Mar 2022 18:54)  furosemide 20 mg oral tablet: 1 tab(s) orally once a day (2022 01:35)  hydrALAZINE 25 mg oral tablet: 1 tab(s) orally 3 times a day (06 Mar 2022 18:54)  isosorbide mononitrate 30 mg oral tablet, extended release: 1 tab(s) orally once a day (in the morning) (2021 22:51)  latanoprost 0.005% ophthalmic solution: 1 drop(s) to each affected eye 2 times a day (2021 22:51)  Lipitor 20 mg oral tablet: 1 tab(s) orally once a day (at bedtime) (12 Mar 2022 19:28)  Metoprolol Succinate ER 25 mg oral tablet, extended release: 1 tab(s) orally once a day (12 Mar 2022 19:28)  PARICALCITOL 1 MCG CAP:  (06 Mar 2022 18:54)  Timolol Maleate (Eqv-Timoptic) 0.5% ophthalmic solution: 1 drop(s) to each affected eye 2 times a day (12 Mar 2022 19:28)  Zoloft 50 mg oral tablet: 1 tab(s) orally once a day (12 Mar 2022 19:28)      SOCIAL HISTORY:  Social History:  Denies tobacco, etoh or illicit drug use (2022 00:22)      FAMILY HISTORY:  FH: arthritis  sister        REVIEW OF SYSTEMS:  All other review of systems is negative unless indicated above.    VITALS:  T(F): 97.4 (22 @ 05:18), Max: 98 (22 22:39)  HR: 62 (22 @ 05:18)  BP: 152/69 (22 @ 05:18)  RR: 18 (22 @ 05:18)  SpO2: 98% (22 @ 17:36)    Height (cm): 160 (:39)  Weight (kg): 67.8 ( 22:39)  BMI (kg/m2): 26.5 ( 22:39)  BSA (m2): 1.71 (:39)  I&O's Detail    2022 07:  -  2022 07:00  --------------------------------------------------------  IN:  Total IN: 0 mL    OUT:    Voided (mL): 375 mL  Total OUT: 375 mL    Total NET: -375 mL            I&O's Summary    2022 07:  -  2022 07:00  --------------------------------------------------------  IN: 0 mL / OUT: 375 mL / NET: -375 mL        PHYSICAL EXAM:  Gen: NAD  resp: CTAB  card: S1/S2  abd: soft  ext: no edema    LABS:  Daily Height in cm: 160.02 (2022 22:39)    Daily Weight in k.7 (2022 05:18)        129<L>  |  96<L>  |  56<H>  ----------------------------<  102<H>  5.8<H>   |  17  |  3.4<H>    Ca    9.2      2022 04:30  Phos  4.1       Mg     2.1         TPro  5.7<L>  /  Alb  3.2<L>  /  TBili  <0.2  /  DBili      /  AST  29  /  ALT  10  /  AlkPhos  64      Creatinine Trend:   Creatinine, Serum: 3.4 mg/dL (22 @ 04:30)  Creatinine, Serum: 3.8 mg/dL (22 @ 13:02)  Creatinine, Serum: 3.8 mg/dL (22 @ 12:00)  Creatinine, Serum: 3.8 mg/dL (22 @ 07:30)  Creatinine, Serum: 3.8 mg/dL (22 @ 19:31)  Creatinine, Serum: 3.9 mg/dL (22 @ 16:24)  Creatinine, Serum: 2.7 mg/dL (22 @ 07:10)  Creatinine, Serum: 3.3 mg/dL (22 @ 12:32)                            8.9    5.83  )-----------( 196      ( 2022 04:30 )             28.2     Mean Cell Volume: 91.6 fL (22 @ 04:30)    Urine Studies:  Urinalysis Basic - ( 2022 11:30 )    Color: Light Yellow / Appearance: Slightly Turbid / S.007 / pH:   Gluc:  / Ketone: Negative  / Bili: Negative / Urobili: <2 mg/dL   Blood:  / Protein: 30 mg/dL / Nitrite: Positive   Leuk Esterase: Large / RBC: 0 /HPF /  /HPF   Sq Epi:  / Non Sq Epi: 1 /HPF / Bacteria: Few      Osmolality, Random Urine: 264 mos/kg ( @ 11:30)  Potassium, Random Urine: 20 mmol/L ( @ 11:30)            RADIOLOGY & ADDITIONAL STUDIES:    < from: TTE Echo Complete w/o Contrast w/ Doppler (22 @ 09:56) >  Summary:   1. Left ventricular ejection fraction, by visual estimation, is 25 to   30%.   2. Moderately to severely decreased global left ventricular systolic   function.   3. Spectral Doppler shows impaired relaxation pattern of left   ventricular myocardial filling (Grade I diastolic dysfunction).   4. Moderately enlarged leftatrium.   5. Normal right atrial size.   6. Mild thickening of the anterior and posterior mitral valve leaflets.   7. Moderate mitral valve regurgitation.   8. Severe mitral annular calcification.   9. Moderate tricuspid regurgitation.  10. PSAP 40.  11. Mild pulmonic valve regurgitation.    < end of copied text >      Xray Chest 1 View- PORTABLE-Urgent:   ACC: [id number] EXAM:  XR CHEST PORTABLE URGENT 1V                          PROCEDURE DATE:  2022          INTERPRETATION:  Clinical History / Reason for exam: Chest pain    Comparison : Chest radiograph 2022 .    Technique/Positioning: Single frontal chest x-ray obtained.    Findings:    Support devices: Stable left ICD.    Cardiac/mediastinum/hilum: Unchanged.    Lung parenchyma/Pleura: Within normal limits.    Skeleton/soft tissues: Unchanged.    Impression:    No radiographic evidence of acute cardiopulmonary disease.        --- End of Report ---            MADIE KRUSE MD; Attending Radiologist  This document has been electronically signed. 2022  9:56AM (22 @ 07:22)                     NEPHROLOGY CONSULTATION NOTE    RACHEL MARRUFO  86y  Female  MRN-[id number]    CC:   Patient is a 86y old  Female who presents with a chief complaint of right lower leg pain/social admit (2022 07:39)      HPI:  Mrs. Marrufo 86 year old female (jehovahs witness) with PMX HFrEF 25% s/p AICD, pulm HTN, provoked PE/DVT on eliquis, DLD, glaucoma  CKD  presents to ED for "right shoulder pain, radiating to elbow, radiating to hip and radiating to toes" for one week which was worsening since last night. Patient reports she had symptoms of such in the past; does not recall what she was diagnosed with. Patient reports pain worsening on ambulations, and having a great difficult ambulating. Usually walks with assistance and a cane which has worsened. Patient reports pain improves on rest. Patient denies headaches, vision changes, sob, dizziness, chest pain.   Of note patient hospitalized multiple times in past; recently admitted 3/28/2022 for atypical chest pain most likely musculoskeletal no ekg changes and advised to follow up with cardiologist (Dr. Mckinney) outpatient.     In the ED, vitals stable. Labs significant for Na 131, K 6 (hemolyzed), creat 3.8 (previously 2.7), trop 0.02 (previously 0.02 1 month ago). Patient was given 10 units regular insulin, dextrose, calcium gluconate, morphine, Tylenol zofran and 1 L bolus of NS in ED. Patient currently admitted to medicine for further management.  (2022 00:22)      PAST MEDICAL & SURGICAL HISTORY:  Chronic kidney disease    Pacemaker    Hypertension    Gout    Diverticulitis  sigmoid    Diastolic heart failure    Rheumatoid arthritis    DVT, lower extremity    Artificial pacemaker    History of appendectomy    History of tonsillectomy    History of hysterectomy      Allergies:  Keflex (Swelling)    Home Medications Reviewed  Hospital Medications:   MEDICATIONS  (STANDING):  apixaban 2.5 milliGRAM(s) Oral two times a day  atorvastatin 20 milliGRAM(s) Oral at bedtime  chlorhexidine 4% Liquid 1 Application(s) Topical <User Schedule>  dextrose 10%. 250 milliLiter(s) (250 mL/Hr) IV Continuous <Continuous>  furosemide    Tablet 20 milliGRAM(s) Oral daily  hydrALAZINE 25 milliGRAM(s) Oral three times a day  isosorbide   mononitrate ER Tablet (IMDUR) 30 milliGRAM(s) Oral daily  latanoprost 0.005% Ophthalmic Solution 1 Drop(s) Both EYES at bedtime  methocarbamol 500 milliGRAM(s) Oral every 8 hours  metoprolol succinate ER 25 milliGRAM(s) Oral daily  sertraline 50 milliGRAM(s) Oral daily  sodium zirconium cyclosilicate 10 Gram(s) Oral daily  timolol 0.5% Solution 1 Drop(s) Both EYES two times a day    MEDICATIONS  (PRN):  acetaminophen     Tablet .. 650 milliGRAM(s) Oral every 6 hours PRN Temp greater or equal to 38C (100.4F), Mild Pain (1 - 3)  aluminum hydroxide/magnesium hydroxide/simethicone Suspension 30 milliLiter(s) Oral every 4 hours PRN Dyspepsia  melatonin 3 milliGRAM(s) Oral at bedtime PRN Insomnia  ondansetron Injectable 4 milliGRAM(s) IV Push every 8 hours PRN Nausea and/or Vomiting    Home Medications:  apixaban 2.5 mg oral tablet: 1 tab(s) orally 2 times a day (06 Mar 2022 18:54)  furosemide 20 mg oral tablet: 1 tab(s) orally once a day (2022 01:35)  hydrALAZINE 25 mg oral tablet: 1 tab(s) orally 3 times a day (06 Mar 2022 18:54)  isosorbide mononitrate 30 mg oral tablet, extended release: 1 tab(s) orally once a day (in the morning) (2021 22:51)  latanoprost 0.005% ophthalmic solution: 1 drop(s) to each affected eye 2 times a day (2021 22:51)  Lipitor 20 mg oral tablet: 1 tab(s) orally once a day (at bedtime) (12 Mar 2022 19:28)  Metoprolol Succinate ER 25 mg oral tablet, extended release: 1 tab(s) orally once a day (12 Mar 2022 19:28)  PARICALCITOL 1 MCG CAP:  (06 Mar 2022 18:54)  Timolol Maleate (Eqv-Timoptic) 0.5% ophthalmic solution: 1 drop(s) to each affected eye 2 times a day (12 Mar 2022 19:28)  Zoloft 50 mg oral tablet: 1 tab(s) orally once a day (12 Mar 2022 19:28)      SOCIAL HISTORY:  Social History:  Denies tobacco, etoh or illicit drug use (2022 00:22)      FAMILY HISTORY:  FH: arthritis  sister        REVIEW OF SYSTEMS:  c/o R knee pain  c/o RLQ  and R flank pain  All other review of systems is negative unless indicated above.    VITALS:  T(F): 97.4 (22 @ 05:18), Max: 98 (22 22:39)  HR: 62 (22 @ 05:18)  BP: 152/69 (22 @ 05:18)  RR: 18 (22 @ 05:18)  SpO2: 98% (22 @ 17:36)    Height (cm): 160 (:39)  Weight (kg): 67.8 ( 22:39)  BMI (kg/m2): 26.5 (:39)  BSA (m2): 1.71 (:39)  I&O's Detail    2022 07:  -  2022 07:00  --------------------------------------------------------  IN:  Total IN: 0 mL    OUT:    Voided (mL): 375 mL  Total OUT: 375 mL    Total NET: -375 mL            I&O's Summary    2022 07:  -  2022 07:00  --------------------------------------------------------  IN: 0 mL / OUT: 375 mL / NET: -375 mL        PHYSICAL EXAM:  Gen: NAD  resp: CTAB  card: S1/S2  abd: soft, RLQ TTDP  ext: no edema    LABS:  Daily Height in cm: 160.02 (2022 22:39)    Daily Weight in k.7 (2022 05:18)        129<L>  |  96<L>  |  56<H>  ----------------------------<  102<H>  5.8<H>   |  17  |  3.4<H>    Ca    9.2      2022 04:30  Phos  4.1       Mg     2.1         TPro  5.7<L>  /  Alb  3.2<L>  /  TBili  <0.2  /  DBili      /  AST  29  /  ALT  10  /  AlkPhos  64      Creatinine Trend:   Creatinine, Serum: 3.4 mg/dL (22 @ 04:30)  Creatinine, Serum: 3.8 mg/dL (22 @ 13:02)  Creatinine, Serum: 3.8 mg/dL (22 @ 12:00)  Creatinine, Serum: 3.8 mg/dL (22 @ 07:30)  Creatinine, Serum: 3.8 mg/dL (22 @ 19:31)  Creatinine, Serum: 3.9 mg/dL (22 @ 16:24)  Creatinine, Serum: 2.7 mg/dL (22 @ 07:10)  Creatinine, Serum: 3.3 mg/dL (22 @ 12:32)                            8.9    5.83  )-----------( 196      ( 2022 04:30 )             28.2     Mean Cell Volume: 91.6 fL (22 @ 04:30)    Urine Studies:  Urinalysis Basic - ( 2022 11:30 )    Color: Light Yellow / Appearance: Slightly Turbid / S.007 / pH:   Gluc:  / Ketone: Negative  / Bili: Negative / Urobili: <2 mg/dL   Blood:  / Protein: 30 mg/dL / Nitrite: Positive   Leuk Esterase: Large / RBC: 0 /HPF /  /HPF   Sq Epi:  / Non Sq Epi: 1 /HPF / Bacteria: Few      Osmolality, Random Urine: 264 mos/kg ( @ 11:30)  Potassium, Random Urine: 20 mmol/L ( @ 11:30)            RADIOLOGY & ADDITIONAL STUDIES:    < from: TTE Echo Complete w/o Contrast w/ Doppler (22 @ 09:56) >  Summary:   1. Left ventricular ejection fraction, by visual estimation, is 25 to   30%.   2. Moderately to severely decreased global left ventricular systolic   function.   3. Spectral Doppler shows impaired relaxation pattern of left   ventricular myocardial filling (Grade I diastolic dysfunction).   4. Moderately enlarged leftatrium.   5. Normal right atrial size.   6. Mild thickening of the anterior and posterior mitral valve leaflets.   7. Moderate mitral valve regurgitation.   8. Severe mitral annular calcification.   9. Moderate tricuspid regurgitation.  10. PSAP 40.  11. Mild pulmonic valve regurgitation.    < end of copied text >      Xray Chest 1 View- PORTABLE-Urgent:   ACC: [id number] EXAM:  XR CHEST PORTABLE URGENT 1V                          PROCEDURE DATE:  2022          INTERPRETATION:  Clinical History / Reason for exam: Chest pain    Comparison : Chest radiograph 2022 .    Technique/Positioning: Single frontal chest x-ray obtained.    Findings:    Support devices: Stable left ICD.    Cardiac/mediastinum/hilum: Unchanged.    Lung parenchyma/Pleura: Within normal limits.    Skeleton/soft tissues: Unchanged.    Impression:    No radiographic evidence of acute cardiopulmonary disease.        --- End of Report ---            MADIE KRUSE MD; Attending Radiologist  This document has been electronically signed. 2022  9:56AM (22 @ 07:22)

## 2022-04-27 NOTE — CONSULT NOTE ADULT - ASSESSMENT
# OMERO / CKD  # Pyuria / R lank pain  # Hyponatremia  # Hyperkalemia   # Metabolic acidosis  # HTN  # Normocytic anemia     - change lasix to torsemide 20mg po daily  - limit fluid intake to 1 liter daily  - check urine sodium, serum osm  - document strict i/o  - obtain renal/bladder ultrasound  - check urine cx, start abx coverage for UTI once urine cx obtained  - renal diet / ensure pt is on low K  - increase lokelma to q12h dosing until K < 5.5   - start sodium bicarb 1300mg q8h  - phos level at goal, no need for phos binder  - check iron stores including ferritin

## 2022-04-27 NOTE — PHYSICAL THERAPY INITIAL EVALUATION ADULT - SIT-TO-STAND BALANCE
-- DO NOT REPLY / DO NOT REPLY ALL --  -- Message is from the [facility]--    General Patient Message      Reason for Call: [facility] calling to verify the sample they got is blood test or bon marrow   The test they receive is for fish mm    pls call back to provide info   Fax: [phone]  Caller Information       Type Contact Phone    11/17/2021 03:42 PM CST Phone (Incoming) [facility]  (Other) [phone]          Alternative phone number: none     Turnaround time given to caller:   \"This message will be sent to [state Provider's name]. The clinical team will fulfill your request as soon as they review your message.\"    
Returned call to lab and verified that sample was blood sample and verified lab orders collected today.  
[facility]

## 2022-04-28 LAB
ALBUMIN SERPL ELPH-MCNC: 3.3 G/DL — LOW (ref 3.5–5.2)
ALP SERPL-CCNC: 69 U/L — SIGNIFICANT CHANGE UP (ref 30–115)
ALT FLD-CCNC: 12 U/L — SIGNIFICANT CHANGE UP (ref 0–41)
ANION GAP SERPL CALC-SCNC: 12 MMOL/L — SIGNIFICANT CHANGE UP (ref 7–14)
AST SERPL-CCNC: 34 U/L — SIGNIFICANT CHANGE UP (ref 0–41)
BILIRUB SERPL-MCNC: <0.2 MG/DL — SIGNIFICANT CHANGE UP (ref 0.2–1.2)
BUN SERPL-MCNC: 48 MG/DL — HIGH (ref 10–20)
CALCIUM SERPL-MCNC: 8.6 MG/DL — SIGNIFICANT CHANGE UP (ref 8.5–10.1)
CHLORIDE SERPL-SCNC: 90 MMOL/L — LOW (ref 98–110)
CO2 SERPL-SCNC: 23 MMOL/L — SIGNIFICANT CHANGE UP (ref 17–32)
CREAT SERPL-MCNC: 3 MG/DL — HIGH (ref 0.7–1.5)
CRP SERPL-MCNC: <3 MG/L — SIGNIFICANT CHANGE UP
CRP SERPL-MCNC: <3 MG/L — SIGNIFICANT CHANGE UP
EGFR: 15 ML/MIN/1.73M2 — LOW
ERYTHROCYTE [SEDIMENTATION RATE] IN BLOOD: 62 MM/HR — HIGH (ref 0–20)
ERYTHROCYTE [SEDIMENTATION RATE] IN BLOOD: 80 MM/HR — HIGH (ref 0–20)
GLUCOSE BLDC GLUCOMTR-MCNC: 116 MG/DL — HIGH (ref 70–99)
GLUCOSE BLDC GLUCOMTR-MCNC: 128 MG/DL — HIGH (ref 70–99)
GLUCOSE BLDC GLUCOMTR-MCNC: 242 MG/DL — HIGH (ref 70–99)
GLUCOSE BLDC GLUCOMTR-MCNC: 90 MG/DL — SIGNIFICANT CHANGE UP (ref 70–99)
GLUCOSE SERPL-MCNC: 91 MG/DL — SIGNIFICANT CHANGE UP (ref 70–99)
OSMOLALITY SERPL: 288 MOS/KG — SIGNIFICANT CHANGE UP (ref 280–301)
POTASSIUM SERPL-MCNC: 5 MMOL/L — SIGNIFICANT CHANGE UP (ref 3.5–5)
POTASSIUM SERPL-SCNC: 5 MMOL/L — SIGNIFICANT CHANGE UP (ref 3.5–5)
PROT SERPL-MCNC: 5.8 G/DL — LOW (ref 6–8)
RHEUMATOID FACT SERPL-ACNC: 17 IU/ML — HIGH (ref 0–13)
SODIUM SERPL-SCNC: 125 MMOL/L — LOW (ref 135–146)
TSH SERPL-MCNC: 1.1 UIU/ML — SIGNIFICANT CHANGE UP (ref 0.27–4.2)
URATE SERPL-MCNC: 7.6 MG/DL — HIGH (ref 2.5–7)

## 2022-04-28 PROCEDURE — 99232 SBSQ HOSP IP/OBS MODERATE 35: CPT

## 2022-04-28 PROCEDURE — 76770 US EXAM ABDO BACK WALL COMP: CPT | Mod: 26

## 2022-04-28 RX ORDER — SODIUM BICARBONATE 1 MEQ/ML
650 SYRINGE (ML) INTRAVENOUS THREE TIMES A DAY
Refills: 0 | Status: DISCONTINUED | OUTPATIENT
Start: 2022-04-28 | End: 2022-05-01

## 2022-04-28 RX ORDER — CIPROFLOXACIN LACTATE 400MG/40ML
250 VIAL (ML) INTRAVENOUS DAILY
Refills: 0 | Status: DISCONTINUED | OUTPATIENT
Start: 2022-04-28 | End: 2022-05-01

## 2022-04-28 RX ORDER — SODIUM ZIRCONIUM CYCLOSILICATE 10 G/10G
10 POWDER, FOR SUSPENSION ORAL
Refills: 0 | Status: DISCONTINUED | OUTPATIENT
Start: 2022-04-28 | End: 2022-04-29

## 2022-04-28 RX ADMIN — Medication 25 MILLIGRAM(S): at 05:52

## 2022-04-28 RX ADMIN — Medication 650 MILLIGRAM(S): at 14:51

## 2022-04-28 RX ADMIN — Medication 25 MILLIGRAM(S): at 14:50

## 2022-04-28 RX ADMIN — ISOSORBIDE MONONITRATE 30 MILLIGRAM(S): 60 TABLET, EXTENDED RELEASE ORAL at 11:30

## 2022-04-28 RX ADMIN — APIXABAN 2.5 MILLIGRAM(S): 2.5 TABLET, FILM COATED ORAL at 17:12

## 2022-04-28 RX ADMIN — Medication 1300 MILLIGRAM(S): at 05:51

## 2022-04-28 RX ADMIN — Medication 20 MILLIGRAM(S): at 05:51

## 2022-04-28 RX ADMIN — Medication 250 MILLIGRAM(S): at 11:31

## 2022-04-28 RX ADMIN — Medication 650 MILLIGRAM(S): at 22:58

## 2022-04-28 RX ADMIN — Medication 25 MILLIGRAM(S): at 22:57

## 2022-04-28 RX ADMIN — ATORVASTATIN CALCIUM 20 MILLIGRAM(S): 80 TABLET, FILM COATED ORAL at 22:57

## 2022-04-28 RX ADMIN — APIXABAN 2.5 MILLIGRAM(S): 2.5 TABLET, FILM COATED ORAL at 05:51

## 2022-04-28 RX ADMIN — LATANOPROST 1 DROP(S): 0.05 SOLUTION/ DROPS OPHTHALMIC; TOPICAL at 22:57

## 2022-04-28 RX ADMIN — Medication 1 DROP(S): at 17:14

## 2022-04-28 RX ADMIN — METHOCARBAMOL 500 MILLIGRAM(S): 500 TABLET, FILM COATED ORAL at 22:58

## 2022-04-28 RX ADMIN — METHOCARBAMOL 500 MILLIGRAM(S): 500 TABLET, FILM COATED ORAL at 05:52

## 2022-04-28 RX ADMIN — SERTRALINE 50 MILLIGRAM(S): 25 TABLET, FILM COATED ORAL at 11:31

## 2022-04-28 RX ADMIN — METHOCARBAMOL 500 MILLIGRAM(S): 500 TABLET, FILM COATED ORAL at 14:50

## 2022-04-28 NOTE — OCCUPATIONAL THERAPY INITIAL EVALUATION ADULT - TRANSFER TRAINING, PT EVAL
In one week, pt will demonstrate sit<>stand/bed/commode transfers with mod assist with appropriate AD.

## 2022-04-28 NOTE — OCCUPATIONAL THERAPY INITIAL EVALUATION ADULT - ADL RETRAINING, OT EVAL
In one week, pt will demonstrate UB dressing with supervision and set up. In one week, pt will demonstrate LB dressing with mod assist.

## 2022-04-28 NOTE — PROGRESS NOTE ADULT - SUBJECTIVE AND OBJECTIVE BOX
Patient is a 86y old  Female who presents with a chief complaint of right lower leg pain/social admit       HPI:  Mrs. Marrufo 86 year old female (jehovahs witness) with PMX HFrEF 25% s/p AICD, pulm HTN, provoked PE/DVT on eliquis, DLD, glaucoma  CKD  presents to ED for "right shoulder pain, radiating to elbow, radiating to hip and radiating to toes" for one week which was worsening since last night. Patient reports she had symptoms of such in the past; does not recall what she was diagnosed with. Patient reports pain worsening on ambulations, and having a great difficult ambulating. Usually walks with assistance and a cane which has worsened. Patient reports pain improves on rest. Patient denies headaches, vision changes, sob, dizziness, chest pain.   Of note patient hospitalized multiple times in past; recently admitted 3/28/2022 for atypical chest pain most likely musculoskeletal no ekg changes and advised to follow up with cardiologist (Dr. Mckinney) outpatient.     In the ED, vitals stable. Labs significant for Na 131, K 6 (hemolyzed), creat 3.8 (previously 2.7), trop 0.02 (previously 0.02 1 month ago). Patient was given 10 units regular insulin, dextrose, calcium gluconate, morphine, Tylenol zofran and 1 L bolus of NS in ED. Patient currently admitted to medicine for further management.   - hip xr shoulder xr consistent without osteoarthitis/osteopenia   - rheum consult:  Pt's current symptoms are not suspicious for rheumatoid arthritis. Unclear what is causing current flare of pain even with significant osteoarthritis       PHYSICAL EXAM    Vital Signs Last 24 Hrs  T(C): 36.9 (2022 14:07), Max: 36.9 (2022 14:07)  T(F): 98.4 (2022 14:07), Max: 98.4 (2022 14:07)  HR: 68 (2022 14:07) (67 - 71)  BP: 136/71 (2022 14:07) (127/62 - 152/72)  BP(mean): --  RR: 16 (2022 14:07) (16 - 18)  SpO2: --    Constitutional - NAD  Chest - CTA  Cardiovascular - S1S2+  Abdomen -  Soft  Extremities -  No calf tenderness   Function : transfer CG                 ambulate 150'RW CG                 7 steps CG  acetaminophen     Tablet .. 650 milliGRAM(s) Oral every 6 hours PRN  aluminum hydroxide/magnesium hydroxide/simethicone Suspension 30 milliLiter(s) Oral every 4 hours PRN  apixaban 2.5 milliGRAM(s) Oral two times a day  atorvastatin 20 milliGRAM(s) Oral at bedtime  chlorhexidine 4% Liquid 1 Application(s) Topical <User Schedule>  ciprofloxacin     Tablet 250 milliGRAM(s) Oral daily  dextrose 10%. 250 milliLiter(s) IV Continuous <Continuous>  hydrALAZINE 25 milliGRAM(s) Oral three times a day  isosorbide   mononitrate ER Tablet (IMDUR) 30 milliGRAM(s) Oral daily  latanoprost 0.005% Ophthalmic Solution 1 Drop(s) Both EYES at bedtime  melatonin 3 milliGRAM(s) Oral at bedtime PRN  methocarbamol 500 milliGRAM(s) Oral every 8 hours  metoprolol succinate ER 25 milliGRAM(s) Oral daily  ondansetron Injectable 4 milliGRAM(s) IV Push every 8 hours PRN  sertraline 50 milliGRAM(s) Oral daily  sodium bicarbonate 650 milliGRAM(s) Oral three times a day  sodium zirconium cyclosilicate 10 Gram(s) Oral two times a day  timolol 0.5% Solution 1 Drop(s) Both EYES two times a day  torsemide 20 milliGRAM(s) Oral daily      RECENT LABS/IMAGING                        8.9    5.83  )-----------( 196      ( 2022 04:30 )             28.2         125<L>  |  90<L>  |  48<H>  ----------------------------<  91  5.0   |  23  |  3.0<H>    Ca    8.6      2022 11:50    TPro  5.8<L>  /  Alb  3.3<L>  /  TBili  <0.2  /  DBili  x   /  AST  34  /  ALT  12  /  AlkPhos  69  28      Urinalysis Basic - ( 2022 11:30 )    Color: Light Yellow / Appearance: Slightly Turbid / S.007 / pH: x  Gluc: x / Ketone: Negative  / Bili: Negative / Urobili: <2 mg/dL   Blood: x / Protein: 30 mg/dL / Nitrite: Positive   Leuk Esterase: Large / RBC: 0 /HPF /  /HPF   Sq Epi: x / Non Sq Epi: 1 /HPF / Bacteria: Few

## 2022-04-28 NOTE — PROGRESS NOTE ADULT - ASSESSMENT
# OMERO / CKD  # Pyuria / R lank pain  # Hyponatremia  # Hyperkalemia   # Metabolic acidosis  # HTN  # Normocytic anemia   - continue  torsemide 20mg po daily  - cr trending down   - limit fluid intake to 1 liter daily  - sodium level stable/ check repeat today  - obtain renal/bladder ultrasound  - check urine cx, start abx coverage for UTI once urine cx obtained  - renal diet / ensure pt is on low K  - increase lokelma to q12h dosing until K < 5.5   - decrease  sodium bicarb 650 mg q8h  - phos level at goal, no need for phos binder  - check iron stores including ferritin    - rheumatology notes appreciated   -will follow

## 2022-04-28 NOTE — PROGRESS NOTE ADULT - ASSESSMENT
Imp : rehab of multiple joint pain / high functioning  Plan : continue bedside therapy as tolerated            may d/c home when medically cleared

## 2022-04-28 NOTE — OCCUPATIONAL THERAPY INITIAL EVALUATION ADULT - IMPAIRED TRANSFERS: BED/CHAIR, REHAB EVAL
See details above regarding pain./impaired balance/decreased flexibility/narrow base of support/pain/decreased ROM/decreased strength

## 2022-04-28 NOTE — PROGRESS NOTE ADULT - ASSESSMENT
86 year old female (jehovahs witness) with PMX HFrEF 25% s/p AICD, pulm HTN, provoked PE/DVT on eliquis, DLD, glaucoma  CKD  presents to ED for "right shoulder pain, radiating to elbow, radiating to hip and radiating to toes" for one week which was worsening since last night.    Hyperkalemia  Hyponatremia  OMERO on CKD-4  Chronic HFrEF S/P AICD  Pulmonary HTN  Chronic DVT on Eliquis  RA  UTI              PLAN:    ·	Cont tele  ·	Nephrology f/u noted. Increase Lowkelma to 10 gm po q 12h until K is <5.5  ·	Low K diet  ·	Water restriction to 1L per day  ·	Hyperkalemia. No ACE-I or ARB  ·	Hyponatremic. Check BMP  ·	Urine Na is 92  ·	Cr is trending down   ·	Monitor renal function  ·	Renal/bladder US  ·	Rheumatology eval noted. Unlikely RA. Recommended to send for ESR, CRP, and CCP Ab.  ·	X-Ray R shoulder and hip reviewed. Osteoarthritis, osteopenia, degenerative changes  ·	Pain control  ·	Cont Eliquis and her other meds  ·	PT eval noted. Recommended home with home services.   ·	For UTI started her on PO Cipro 250 mg po q 12h  ·	Check urine cxs    Progress Note Handoff    Pending (specify):  Consults_______, Tests________, Test Results_______, Other__Social services for d/c planning_______  Family discussion:  Disposition: Home___/SNF___/Other________/Unknown at this time________    Francisco Pacheco MD  Spectra: [phone]

## 2022-04-28 NOTE — PROGRESS NOTE ADULT - SUBJECTIVE AND OBJECTIVE BOX
RACHEL SUMMERS 86y Female  MRN#: [id number]   Hospital Day: 3d    SUBJECTIVE  Patient is a 86y old Female who presents with a chief complaint of right lower leg pain/social admit (2022 10:47)  Currently admitted to medicine with the primary diagnosis of Hyperkalemia      INTERVAL HPI AND OVERNIGHT EVENTS:  Patient was examined and seen at bedside. This morning she is resting comfortably in bed. No issues or overnight events.    OBJECTIVE  PAST MEDICAL & SURGICAL HISTORY  Chronic kidney disease    Pacemaker    Hypertension    Gout    Diverticulitis  sigmoid    Diastolic heart failure    Rheumatoid arthritis    DVT, lower extremity    Artificial pacemaker    History of appendectomy    History of tonsillectomy    History of hysterectomy      ALLERGIES:  Keflex (Swelling)    MEDICATIONS:  STANDING MEDICATIONS  apixaban 2.5 milliGRAM(s) Oral two times a day  atorvastatin 20 milliGRAM(s) Oral at bedtime  chlorhexidine 4% Liquid 1 Application(s) Topical <User Schedule>  ciprofloxacin     Tablet 250 milliGRAM(s) Oral daily  dextrose 10%. 250 milliLiter(s) IV Continuous <Continuous>  hydrALAZINE 25 milliGRAM(s) Oral three times a day  isosorbide   mononitrate ER Tablet (IMDUR) 30 milliGRAM(s) Oral daily  latanoprost 0.005% Ophthalmic Solution 1 Drop(s) Both EYES at bedtime  methocarbamol 500 milliGRAM(s) Oral every 8 hours  metoprolol succinate ER 25 milliGRAM(s) Oral daily  sertraline 50 milliGRAM(s) Oral daily  sodium bicarbonate 650 milliGRAM(s) Oral three times a day  sodium zirconium cyclosilicate 10 Gram(s) Oral two times a day  timolol 0.5% Solution 1 Drop(s) Both EYES two times a day  torsemide 20 milliGRAM(s) Oral daily    PRN MEDICATIONS  acetaminophen     Tablet .. 650 milliGRAM(s) Oral every 6 hours PRN  aluminum hydroxide/magnesium hydroxide/simethicone Suspension 30 milliLiter(s) Oral every 4 hours PRN  melatonin 3 milliGRAM(s) Oral at bedtime PRN  ondansetron Injectable 4 milliGRAM(s) IV Push every 8 hours PRN      VITAL SIGNS: Last 24 Hours  T(C): 36.7 (2022 05:26), Max: 36.8 (2022 14:19)  T(F): 98.1 (2022 05:26), Max: 98.2 (2022 14:19)  HR: 67 (2022 05:26) (64 - 71)  BP: 127/62 (2022 05:26) (127/62 - 154/70)  BP(mean): --  RR: 17 (2022 05:26) (16 - 18)  SpO2: --    LABS:                        8.9    5.83  )-----------( 196      ( 2022 04:30 )             28.2         128<L>  |  94<L>  |  54<H>  ----------------------------<  150<H>  5.1<H>   |  21  |  3.1<H>    Ca    9.1      2022 16:00    TPro  5.7<L>  /  Alb  3.2<L>  /  TBili  <0.2  /  DBili  x   /  AST  29  /  ALT  10  /  AlkPhos  64        Urinalysis Basic - ( 2022 11:30 )    Color: Light Yellow / Appearance: Slightly Turbid / S.007 / pH: x  Gluc: x / Ketone: Negative  / Bili: Negative / Urobili: <2 mg/dL   Blood: x / Protein: 30 mg/dL / Nitrite: Positive   Leuk Esterase: Large / RBC: 0 /HPF /  /HPF   Sq Epi: x / Non Sq Epi: 1 /HPF / Bacteria: Few        Sedimentation Rate, Erythrocyte: 80 mm/Hr *H* (22 @ 07:10)  Sedimentation Rate, Erythrocyte: 62 mm/Hr *H* (22 @ 20:00)          RADIOLOGY:      PHYSICAL EXAM:  CONSTITUTIONAL: No acute distress, well-developed, well-groomed, AAOx3  HEAD: Atraumatic, normocephalic  EYES: EOM intact, PERRLA, conjunctiva and sclera clear  ENT: Supple, no masses, no thyromegaly, no bruits, no JVD; moist mucous membranes  PULMONARY: Clear to auscultation bilaterally; no wheezes, rales, or rhonchi  CARDIOVASCULAR: Regular rate and rhythm; no murmurs, rubs, or gallops  GASTROINTESTINAL: Soft, non-tender, non-distended; bowel sounds present  MUSCULOSKELETAL: 2+ peripheral pulses; no clubbing, no cyanosis, no edema  NEUROLOGY: non-focal  SKIN: No rashes or lesions; warm and dry   RACHEL SUMMERS 86y Female  MRN#: [id number]   Hospital Day: 3d    SUBJECTIVE  Patient is a 86y old Female who presents with a chief complaint of right lower leg pain/social admit (2022 10:47)  Currently admitted to medicine with the primary diagnosis of Hyperkalemia      INTERVAL HPI AND OVERNIGHT EVENTS:  Patient was examined and seen at bedside. This morning she is resting comfortably in bed. No issues or overnight events.    OBJECTIVE  PAST MEDICAL & SURGICAL HISTORY  Chronic kidney disease    Pacemaker    Hypertension    Gout    Diverticulitis  sigmoid    Diastolic heart failure    Rheumatoid arthritis    DVT, lower extremity    Artificial pacemaker    History of appendectomy    History of tonsillectomy    History of hysterectomy      ALLERGIES:  Keflex (Swelling)    MEDICATIONS:  STANDING MEDICATIONS  apixaban 2.5 milliGRAM(s) Oral two times a day  atorvastatin 20 milliGRAM(s) Oral at bedtime  chlorhexidine 4% Liquid 1 Application(s) Topical <User Schedule>  ciprofloxacin     Tablet 250 milliGRAM(s) Oral daily  dextrose 10%. 250 milliLiter(s) IV Continuous <Continuous>  hydrALAZINE 25 milliGRAM(s) Oral three times a day  isosorbide   mononitrate ER Tablet (IMDUR) 30 milliGRAM(s) Oral daily  latanoprost 0.005% Ophthalmic Solution 1 Drop(s) Both EYES at bedtime  methocarbamol 500 milliGRAM(s) Oral every 8 hours  metoprolol succinate ER 25 milliGRAM(s) Oral daily  sertraline 50 milliGRAM(s) Oral daily  sodium bicarbonate 650 milliGRAM(s) Oral three times a day  sodium zirconium cyclosilicate 10 Gram(s) Oral two times a day  timolol 0.5% Solution 1 Drop(s) Both EYES two times a day  torsemide 20 milliGRAM(s) Oral daily    PRN MEDICATIONS  acetaminophen     Tablet .. 650 milliGRAM(s) Oral every 6 hours PRN  aluminum hydroxide/magnesium hydroxide/simethicone Suspension 30 milliLiter(s) Oral every 4 hours PRN  melatonin 3 milliGRAM(s) Oral at bedtime PRN  ondansetron Injectable 4 milliGRAM(s) IV Push every 8 hours PRN      VITAL SIGNS: Last 24 Hours  T(C): 36.7 (2022 05:26), Max: 36.8 (2022 14:19)  T(F): 98.1 (2022 05:26), Max: 98.2 (2022 14:19)  HR: 67 (2022 05:26) (64 - 71)  BP: 127/62 (2022 05:26) (127/62 - 154/70)  BP(mean): --  RR: 17 (2022 05:26) (16 - 18)  SpO2: --    LABS:                        8.9    5.83  )-----------( 196      ( 2022 04:30 )             28.2         128<L>  |  94<L>  |  54<H>  ----------------------------<  150<H>  5.1<H>   |  21  |  3.1<H>    Ca    9.1      2022 16:00    TPro  5.7<L>  /  Alb  3.2<L>  /  TBili  <0.2  /  DBili  x   /  AST  29  /  ALT  10  /  AlkPhos  64        Urinalysis Basic - ( 2022 11:30 )    Color: Light Yellow / Appearance: Slightly Turbid / S.007 / pH: x  Gluc: x / Ketone: Negative  / Bili: Negative / Urobili: <2 mg/dL   Blood: x / Protein: 30 mg/dL / Nitrite: Positive   Leuk Esterase: Large / RBC: 0 /HPF /  /HPF   Sq Epi: x / Non Sq Epi: 1 /HPF / Bacteria: Few        Sedimentation Rate, Erythrocyte: 80 mm/Hr *H* (22 @ 07:10)  Sedimentation Rate, Erythrocyte: 62 mm/Hr *H* (22 @ 20:00)          RADIOLOGY:      PHYSICAL EXAM:  CONSTITUTIONAL: No acute distress, AAOx3  HEAD: Atraumatic, normocephalic  EYES: EOM intact, PERRLA, conjunctiva and sclera clear  ENT: Supple, no masses, no thyromegaly, no bruits, no JVD; moist mucous membranes  PULMONARY: Clear to auscultation bilaterally; no wheezes, rales, or rhonchi  CARDIOVASCULAR: Regular rate and rhythm; no murmurs, rubs, or gallops  GASTROINTESTINAL: Soft, non-tender, non-distended; bowel sounds present  MUSCULOSKELETAL: 2+ peripheral pulses; no clubbing, no cyanosis, no edema. R hip tender to palpation   NEUROLOGY: non-focal, moves all extremitites   SKIN: No rashes or lesions; warm and dry. Noted swelling/nodules in b/l hands

## 2022-04-28 NOTE — OCCUPATIONAL THERAPY INITIAL EVALUATION ADULT - BED MOBILITY TRAINING, PT EVAL
In one week, pt will demonstrate rolling R/L, sit<>supine with distant supervision without bed rails.

## 2022-04-28 NOTE — PROGRESS NOTE ADULT - SUBJECTIVE AND OBJECTIVE BOX
seen and examined  24 h events noted         PAST HISTORY  --------------------------------------------------------------------------------  No significant changes to PMH, PSH, FHx, SHx, unless otherwise noted    ALLERGIES & MEDICATIONS  --------------------------------------------------------------------------------  Allergies    Keflex (Swelling)    Intolerances      Standing Inpatient Medications  apixaban 2.5 milliGRAM(s) Oral two times a day  atorvastatin 20 milliGRAM(s) Oral at bedtime  chlorhexidine 4% Liquid 1 Application(s) Topical <User Schedule>  dextrose 10%. 250 milliLiter(s) IV Continuous <Continuous>  hydrALAZINE 25 milliGRAM(s) Oral three times a day  isosorbide   mononitrate ER Tablet (IMDUR) 30 milliGRAM(s) Oral daily  latanoprost 0.005% Ophthalmic Solution 1 Drop(s) Both EYES at bedtime  methocarbamol 500 milliGRAM(s) Oral every 8 hours  metoprolol succinate ER 25 milliGRAM(s) Oral daily  sertraline 50 milliGRAM(s) Oral daily  sodium bicarbonate 1300 milliGRAM(s) Oral every 8 hours  sodium zirconium cyclosilicate 10 Gram(s) Oral daily  timolol 0.5% Solution 1 Drop(s) Both EYES two times a day  torsemide 20 milliGRAM(s) Oral daily    PRN Inpatient Medications  acetaminophen     Tablet .. 650 milliGRAM(s) Oral every 6 hours PRN  aluminum hydroxide/magnesium hydroxide/simethicone Suspension 30 milliLiter(s) Oral every 4 hours PRN  melatonin 3 milliGRAM(s) Oral at bedtime PRN  ondansetron Injectable 4 milliGRAM(s) IV Push every 8 hours PRN        VITALS/PHYSICAL EXAM  --------------------------------------------------------------------------------  T(C): 36.7 (04-28-22 @ 05:26), Max: 36.8 (04-27-22 @ 14:19)  HR: 67 (04-28-22 @ 05:26) (64 - 71)  BP: 127/62 (04-28-22 @ 05:26) (127/62 - 154/70)  RR: 17 (04-28-22 @ 05:26) (16 - 18)  SpO2: --  Wt(kg): --  Height (cm): 160 (04-26-22 @ 22:39)  Weight (kg): 67.8 (04-26-22 @ 22:39)  BMI (kg/m2): 26.5 (04-26-22 @ 22:39)  BSA (m2): 1.71 (04-26-22 @ 22:39)      04-27-22 @ 07:01  -  04-28-22 @ 07:00  --------------------------------------------------------  IN: 472 mL / OUT: 600 mL / NET: -128 mL      Physical Exam:  	Gen: NAD  	Pulm: CTA B/L  	CV: S1S2; no rub  	Abd: +distended  	LE: no edema      LABS/STUDIES  --------------------------------------------------------------------------------              8.9    5.83  >-----------<  196      [04-27-22 @ 04:30]              28.2     128  |  94  |  54  ----------------------------<  150      [04-27-22 @ 16:00]  5.1   |  21  |  3.1        Ca     9.1     [04-27-22 @ 16:00]    TPro  5.7  /  Alb  3.2  /  TBili  <0.2  /  DBili  x   /  AST  29  /  ALT  10  /  AlkPhos  64  [04-27-22 @ 04:30]      Uric acid 7.6      [04-28-22 @ 07:10]    Creatinine Trend:  SCr 3.1 [04-27 @ 16:00]  SCr 3.4 [04-27 @ 04:30]  SCr 3.8 [04-26 @ 13:02]  SCr 3.8 [04-26 @ 12:00]  SCr 3.8 [04-26 @ 07:30]    Urinalysis - [04-27-22 @ 11:30]      Color Light Yellow / Appearance Slightly Turbid / SG 1.007 / pH 6.0      Gluc Negative / Ketone Negative  / Bili Negative / Urobili <2 mg/dL       Blood Trace / Protein 30 mg/dL / Leuk Est Large / Nitrite Positive      RBC 0 /  / Hyaline 0 / Gran  / Sq Epi  / Non Sq Epi 1 / Bacteria Few    Urine Creatinine 27      [04-27-22 @ 11:30]  Urine Protein 52      [04-26-22 @ 00:49]  Urine Sodium 92.0      [04-26-22 @ 00:49]  Urine Urea Nitrogen 189      [04-27-22 @ 11:30]  Urine Potassium 20      [04-27-22 @ 11:30]  Urine Osmolality 264      [04-27-22 @ 11:30]    Ferritin 252      [04-26-22 @ 07:30]  PTH -- (Ca 8.4)      [03-07-22 @ 05:41]   32  TSH 2.22      [03-07-22 @ 05:41]  Lipid: chol 110, TG 60, HDL 35, LDL --      [03-07-22 @ 05:41]      Rheumatoid Factor 17      [04-27-22 @ 16:00]

## 2022-04-28 NOTE — PROGRESS NOTE ADULT - SUBJECTIVE AND OBJECTIVE BOX
RACHEL SUMMERS  86y Female    CHIEF COMPLAINT:    Patient is a 86y old  Female who presents with a chief complaint of right lower leg pain/social admit (2022 08:19)      INTERVAL HPI/OVERNIGHT EVENTS:    Patient seen and examined. R shoulder, hip and foot pain has improved. No other complaint    ROS: All other systems are negative.    Vital Signs:    T(F): 98.1 (22 @ 05:26), Max: 98.2 (22 @ 14:19)  HR: 67 (22 @ 05:26) (64 - 71)  BP: 127/62 (22 @ 05:26) (127/62 - 154/70)  RR: 17 (22 @ 05:26) (16 - 18)  SpO2: --  I&O's Summary    2022 07:01  -  2022 07:00  --------------------------------------------------------  IN: 472 mL / OUT: 600 mL / NET: -128 mL      Daily     Daily Weight in k.3 (2022 05:26)  CAPILLARY BLOOD GLUCOSE      POCT Blood Glucose.: 90 mg/dL (2022 08:15)  POCT Blood Glucose.: 115 mg/dL (2022 22:00)  POCT Blood Glucose.: 136 mg/dL (2022 16:43)  POCT Blood Glucose.: 114 mg/dL (2022 11:29)      PHYSICAL EXAM:    GENERAL:  NAD  SKIN: No rashes or lesions  HENT: Atraumatic. Normocephalic. PERRL. Moist membranes.  NECK: Supple, No JVD. No lymphadenopathy.  PULMONARY: CTA B/L. No wheezing. No rales  CVS: Normal S1, S2. Rate and Rhythm are regular. No murmurs.  ABDOMEN/GI: Soft, Nontender, Nondistended; BS present  EXTREMITIES: Peripheral pulses intact. No edema B/L LE.  NEUROLOGIC:  No motor or sensory deficit.  PSYCH: Alert & oriented x 3    Consultant(s) Notes Reviewed:  [x ] YES  [ ] NO  Care Discussed with Consultants/Other Providers [ x] YES  [ ] NO    EKG reviewed  Telemetry reviewed        LABS:                        8.9    5.83  )-----------( 196      ( 2022 04:30 )             28.2         128<L>  |  94<L>  |  54<H>  ----------------------------<  150<H>  5.1<H>   |  21  |  3.1<H>    Ca    9.1      2022 16:00    TPro  5.7<L>  /  Alb  3.2<L>  /  TBili  <0.2  /  DBili  x   /  AST  29  /  ALT  10  /  AlkPhos  64          Trop 0.02, CKMB --, CK --, 22 @ 21:19  Trop --, CKMB --, , 22 @ 16:24        RADIOLOGY & ADDITIONAL TESTS:    < from: Xray Chest 1 View- PORTABLE-Routine (Xray Chest 1 View- PORTABLE-Routine .) (22 @ 13:24) >  Impression:    Cardiomegaly without acute opacifications or effusions.    < end of copied text >  < from: Xray Hip 2-3 Views, Right (22 @ 10:11) >    Findings/  impression: No acute displaced fracture or dislocation. There are stable   moderate osteoarthritis of the bilateral hips. Diffuse osteopenia.   Degenerative changes of the sacroiliac joints are also noted.    < end of copied text >  < from: VA Duplex Lower Ext Vein Scan, Bilat (22 @ 08:52) >    IMPRESSION:  No evidence of deep venous thrombosis in either lower extremity.      < end of copied text >    Imaging or report Personally Reviewed:  [ x] YES  [ ] NO    Medications:  Standing  apixaban 2.5 milliGRAM(s) Oral two times a day  atorvastatin 20 milliGRAM(s) Oral at bedtime  chlorhexidine 4% Liquid 1 Application(s) Topical <User Schedule>  ciprofloxacin     Tablet 250 milliGRAM(s) Oral daily  dextrose 10%. 250 milliLiter(s) IV Continuous <Continuous>  hydrALAZINE 25 milliGRAM(s) Oral three times a day  isosorbide   mononitrate ER Tablet (IMDUR) 30 milliGRAM(s) Oral daily  latanoprost 0.005% Ophthalmic Solution 1 Drop(s) Both EYES at bedtime  methocarbamol 500 milliGRAM(s) Oral every 8 hours  metoprolol succinate ER 25 milliGRAM(s) Oral daily  sertraline 50 milliGRAM(s) Oral daily  sodium bicarbonate 650 milliGRAM(s) Oral three times a day  sodium zirconium cyclosilicate 10 Gram(s) Oral two times a day  timolol 0.5% Solution 1 Drop(s) Both EYES two times a day  torsemide 20 milliGRAM(s) Oral daily    PRN Meds  acetaminophen     Tablet .. 650 milliGRAM(s) Oral every 6 hours PRN  aluminum hydroxide/magnesium hydroxide/simethicone Suspension 30 milliLiter(s) Oral every 4 hours PRN  melatonin 3 milliGRAM(s) Oral at bedtime PRN  ondansetron Injectable 4 milliGRAM(s) IV Push every 8 hours PRN      Case discussed with resident    Care discussed with pt/family

## 2022-04-28 NOTE — OCCUPATIONAL THERAPY INITIAL EVALUATION ADULT - PHYSICAL ASSIST/NONPHYSICAL ASSIST: CHAIR TO BED, REHAB EVAL
Contacted pt on behalf of Post Procedural Symptom Tracker. Pt verified by first and last name and . Pt denies any fever, cough, or difficulty breathing since procedure. Advised pt if these symptoms do arise to contact [facility] or PCP. No follow up needed.    Reason for Disposition   General information question, no triage required and triager able to answer question    Protocols used: INFORMATION ONLY CALL-A-AH       verbal cues/1 person assist

## 2022-04-28 NOTE — PROGRESS NOTE ADULT - ASSESSMENT
Mrs. Marrufo 86 year old female (jehovahs witness) with PMX HFrEF 25% s/p AICD, pulm HTN, provoked PE/DVT on eliquis, DLD, glaucoma  CKD  presents to ED for right lower leg pain for one week.    #right hip pain, right leg pain, Right shoulder pain  - c/w with pain control, muscle relaxants   - physiatry/PT consult  -    - cxr, hip xr, shoulder xr  - may be component of rheumatoid arthritis (patient states she has but not previously documented) ->rheum consult     # - OMERO on CKD 4  - previous baseline creatinine around 2.6-2.8  - currently 3.8  - OMERO on CKD vs CKD 4 progression  -neprho consult -> Dr. Urrutia   - f/u UA, urine Na, urea, creatinine  - check phos, iPTH  - sodium bicarb q8 650mg   - monitor serum creatinine and electrolytes  - pending AM labs    #hyperkalemia   - was 7.2 in ED with EKG changes, s/p Stat IV insulin and Calcium gluconate -> 5.4  - this AM now 5.8, will give insulin dextrose and start on standing lokelma once daily for now   - low K diet  - lokelma BID    #cystitis   - start cipro, renally dose   - f/u UCx    # ADHF / HFrEF, EF 25-30%  - TTE 3/2022: depressed LVEF 20-25%; moderate mitral and tricuspid regurgitation; enlarged LA  - c/w home dose of hydralazine, nitrates and toprol, lasix 20 mg po;  no ACEi/ARB/Entresto  - c/w Atorvastatin 20mg daily  - on aspirin, eliquis; no Plavix  - NO ACE inhibitors/ ARB/ Entresto/ spironolactone since patient can become severely hyperkalemic as per cardiology     #HTN  - Home meds: toprol, hydralazine and lasix  -NO ACE inhibitors/ ARB/ Entresto/ spironolactone since patient can become severely hyperkalemic     #Hx of PE/DVT  - Hx of provoked PE in the past and soleal vein thrombosis years ago?  - c/w eliquis 2.5 mg bid for now    #HDL  - c/w atorvastatin 20    #Glaucoma  - c/w eye drops    #hx Dysphagia / Anemia  - s/p EGD and colonoscopy in 2020 with non-specific gastritis and Diverticulosis  - Anemia can be due to CKD vs deficiency  - f/u iron panel  -NO BLOOD TRANSFUSIONS AS PER PT WISHES, JEHOVA'S WITNESS    DVT: Eliquis  GI: pantoprazole  Diet: DASH/TLC/Renal diet  Activity: Increase as tolerated  Dispo: Acute     Mrs. Marrufo 86 year old female (jehovahs witness) with PMX HFrEF 25% s/p AICD, pulm HTN, provoked PE/DVT on eliquis, DLD, glaucoma  CKD  presents to ED for right lower leg pain for one week.    #right hip pain, right leg pain, Right shoulder pain  - c/w with pain control, muscle relaxants   - physiatry/PT consult  -    - cxr,  - hip xr shoulder xr consistent without osteoarthitis/osteopenia   - rheum consult:    While pt clearly has significant osteoarthritis, it is not entirely clear what is causing her current flare of pain. It is possible that her current pain could be due to osteoarthritis, but given her h/o crystal arthritis, it is worth repeating her uric acid and inflammatory markers. Pt's current symptoms are not suspicious for rheumatoid arthritis. She had a minimally elevated rheumatoid factor in 2020 but this may have been a false positive.  - f/u uric acid, inflammatory markers, and cyclic citrullinated peptide  - If pt's uric acid and/or inflammatory markers are significantly elevated, would consider a short course of prednisone for a possible flare of crystal arthritis .      # - OMERO on CKD 4  - previous baseline creatinine around 2.6-2.8  - currently 3.8  - OMERO on CKD vs CKD 4 progression  -neprho consult -> Dr. Urrutia   - f/u UA, urine Na, urea, creatinine  - check phos, iPTH  - sodium bicarb q8 650mg   - monitor serum creatinine and electrolytes  - pending AM labs    #hyperkalemia   - was 7.2 in ED with EKG changes, s/p Stat IV insulin and Calcium gluconate -> 5.4  - this AM now 5.8, will give insulin dextrose and start on standing lokelma once daily for now   - low K diet  - lokelma BID    #cystitis   - start cipro, renally dose   - f/u UCx    # ADHF / HFrEF, EF 25-30%  - TTE 3/2022: depressed LVEF 20-25%; moderate mitral and tricuspid regurgitation; enlarged LA  - c/w home dose of hydralazine, nitrates and toprol, lasix 20 mg po;  no ACEi/ARB/Entresto  - c/w Atorvastatin 20mg daily  - on aspirin, eliquis; no Plavix  - NO ACE inhibitors/ ARB/ Entresto/ spironolactone since patient can become severely hyperkalemic as per cardiology     #HTN  - Home meds: toprol, hydralazine and lasix  -NO ACE inhibitors/ ARB/ Entresto/ spironolactone since patient can become severely hyperkalemic     #Hx of PE/DVT  - Hx of provoked PE in the past and soleal vein thrombosis years ago?  - c/w eliquis 2.5 mg bid for now    #HDL  - c/w atorvastatin 20    #Glaucoma  - c/w eye drops    #hx Dysphagia / Anemia  - s/p EGD and colonoscopy in 2020 with non-specific gastritis and Diverticulosis  - Anemia can be due to CKD vs deficiency  - f/u iron panel  -NO BLOOD TRANSFUSIONS AS PER PT WISHES, JEHOVA'S WITNESS    DVT: Eliquis  GI: pantoprazole  Diet: DASH/TLC/Renal diet  Activity: Increase as tolerated  Dispo: Acute     Mrs. Marrufo 86 year old female (jehovahs witness) with PMX HFrEF 25% s/p AICD, pulm HTN, provoked PE/DVT on eliquis, DLD, glaucoma  CKD  presents to ED for right lower leg pain for one week.    #right hip pain, right leg pain, Right shoulder pain  - c/w with pain control, muscle relaxants   - physiatry/PT consult - home PT  - hip xr shoulder xr consistent without osteoarthitis/osteopenia   - rheum consult:  Pt's current symptoms are not suspicious for rheumatoid arthritis. Unclear what is causing current flare of pain even with significant osteoarthritis   - f/u uric acid, inflammatory markers, and cyclic citrullinated peptide    # - OMERO on CKD 4  - 3.8 on admission, previous baseline creatinine around 2.6-2.8  - currently 3.1  - OMERO on CKD vs CKD 4 progression  -neprho consult -> Dr. Urrutia   - f/u UA, urine Na, urea, creatinine  - check phos, iPTH  - sodium bicarb q8 650mg     #hyperkalemia   - was 7.2 in ED with EKG changes, s/p Stat IV insulin and Calcium gluconate -> 5.4  - this AM now 5.8, will give insulin dextrose and start on standing lokelma once daily for now   - low K diet  - lokelma BID    #cystitis   - start cipro, renally dose   - f/u UCx    # ADHF / HFrEF, EF 25-30%  - TTE 3/2022: depressed LVEF 20-25%; moderate mitral and tricuspid regurgitation; enlarged LA  - c/w home dose of hydralazine, nitrates and toprol, lasix 20 mg po;  no ACEi/ARB/Entresto  - c/w Atorvastatin 20mg daily  - on aspirin, eliquis; no Plavix  - NO ACE inhibitors/ ARB/ Entresto/ spironolactone since patient can become severely hyperkalemic as per cardiology     #HTN  - Home meds: toprol, hydralazine and lasix  -NO ACE inhibitors/ ARB/ Entresto/ spironolactone since patient can become severely hyperkalemic     #Hx of PE/DVT  - Hx of provoked PE in the past and soleal vein thrombosis years ago?  - c/w eliquis 2.5 mg bid for now    #HDL  - c/w atorvastatin 20    #Glaucoma  - c/w eye drops    #hx Dysphagia / Anemia  - s/p EGD and colonoscopy in 2020 with non-specific gastritis and Diverticulosis  - Anemia can be due to CKD vs deficiency  - f/u iron panel  -NO BLOOD TRANSFUSIONS AS PER PT WISHES, JEHOVA'S WITNESS    DVT: Eliquis  GI: pantoprazole  Diet: DASH/TLC/Renal diet  Activity: Increase as tolerated  Dispo: Acute     Mrs. Marrufo 86 year old female (jehovahs witness) with PMX HFrEF 25% s/p AICD, pulm HTN, provoked PE/DVT on eliquis, DLD, glaucoma  CKD  presents to ED for right lower leg pain for one week.    #right hip pain, right leg pain, Right shoulder pain  - c/w with pain control, muscle relaxants   - physiatry/PT consult - home PT  - hip xr shoulder xr consistent without osteoarthitis/osteopenia   - rheum consult:  Pt's current symptoms are not suspicious for rheumatoid arthritis. Unclear what is causing current flare of pain even with significant osteoarthritis   - f/u uric acid, inflammatory markers, and cyclic citrullinated peptide    # - OMERO on CKD 4  - 3.8 on admission, previous baseline creatinine around 2.6-2.8  - currently 3.1  - OMERO on CKD vs CKD 4 progression  -neprho consult -> Dr. Urrutia   - f/u UA, urine Na, urea, creatinine  - check phos, iPTH  - sodium bicarb q8 650mg   - more hyponatremic today->will send TSh, cortisol, serum osm. Encourage PO intake     #hyperkalemia   - was 7.2 in ED with EKG changes, s/p Stat IV insulin and Calcium gluconate -> 5.4  - this AM now 5.8, will give insulin dextrose and start on standing lokelma once daily for now   - low K diet  - lokelma BID    #cystitis   - start cipro, renally dose   - f/u UCx    # ADHF / HFrEF, EF 25-30%  - TTE 3/2022: depressed LVEF 20-25%; moderate mitral and tricuspid regurgitation; enlarged LA  - c/w home dose of hydralazine, nitrates and toprol, lasix 20 mg po;  no ACEi/ARB/Entresto  - c/w Atorvastatin 20mg daily  - on aspirin, eliquis; no Plavix  - NO ACE inhibitors/ ARB/ Entresto/ spironolactone since patient can become severely hyperkalemic as per cardiology     #HTN  - Home meds: toprol, hydralazine and lasix  -NO ACE inhibitors/ ARB/ Entresto/ spironolactone since patient can become severely hyperkalemic     #Hx of PE/DVT  - Hx of provoked PE in the past and soleal vein thrombosis years ago?  - c/w eliquis 2.5 mg bid for now    #HDL  - c/w atorvastatin 20    #Glaucoma  - c/w eye drops    #hx Dysphagia / Anemia  - s/p EGD and colonoscopy in 2020 with non-specific gastritis and Diverticulosis  - Anemia can be due to CKD vs deficiency  - f/u iron panel  -NO BLOOD TRANSFUSIONS AS PER PT WISHES, JEHOVA'S WITNESS    DVT: Eliquis  GI: pantoprazole  Diet: DASH/TLC/Renal diet  Activity: Increase as tolerated  Dispo: Acute     Mrs. Marrufo 86 year old female (jehovahs witness) with PMX HFrEF 25% s/p AICD, pulm HTN, provoked PE/DVT on eliquis, DLD, glaucoma  CKD  presents to ED for right lower leg pain for one week.    #right hip pain, right leg pain, Right shoulder pain  - c/w with pain control, muscle relaxants   - physiatry/PT consult - home PT  - hip xr shoulder xr consistent without osteoarthitis/osteopenia   - rheum consult:  Pt's current symptoms are not suspicious for rheumatoid arthritis. Unclear what is causing current flare of pain even with significant osteoarthritis   - f/u uric acid, inflammatory markers, and cyclic citrullinated peptide  - patient states her pain is improved today     # - OMERO on CKD 4  # hyponatremia  - 3.8 on admission, previous baseline creatinine around 2.6-2.8  - currently 3.1  - OMERO on CKD vs CKD 4 progression  -neprho consult -> Dr. Urrutia   - f/u UA, urine Na, urea, creatinine  - check phos, iPTH  - sodium bicarb q8 650mg   - more hyponatremic today->will send TSh, cortisol, serum osm. Encourage PO intake     #hyperkalemia   - was 7.2 in ED with EKG changes, s/p Stat IV insulin and Calcium gluconate -> 5.4  - this AM now 5.8, will give insulin dextrose and start on standing lokelma once daily for now   - low K diet  - lokelma BID    #cystitis   - start cipro, renally dose   - f/u UCx    # ADHF / HFrEF, EF 25-30%  - TTE 3/2022: depressed LVEF 20-25%; moderate mitral and tricuspid regurgitation; enlarged LA  - c/w home dose of hydralazine, nitrates and toprol, lasix 20 mg po;  no ACEi/ARB/Entresto  - c/w Atorvastatin 20mg daily  - on aspirin, eliquis; no Plavix  - NO ACE inhibitors/ ARB/ Entresto/ spironolactone since patient can become severely hyperkalemic as per cardiology     #HTN  - Home meds: toprol, hydralazine and lasix  -NO ACE inhibitors/ ARB/ Entresto/ spironolactone since patient can become severely hyperkalemic     #Hx of PE/DVT  - Hx of provoked PE in the past and soleal vein thrombosis years ago?  - c/w eliquis 2.5 mg bid for now    #HDL  - c/w atorvastatin 20    #Glaucoma  - c/w eye drops    #hx Dysphagia / Anemia  - s/p EGD and colonoscopy in 2020 with non-specific gastritis and Diverticulosis  - Anemia can be due to CKD vs deficiency  - f/u iron panel  -NO BLOOD TRANSFUSIONS AS PER PT WISHES, JEHOVA'S WITNESS    DVT: Eliquis  GI: pantoprazole  Diet: DASH/TLC/Renal diet  Activity: Increase as tolerated  Dispo: Acute     Mrs. Marrufo 86 year old female (jehovahs witness) with PMX HFrEF 25% s/p AICD, pulm HTN, provoked PE/DVT on eliquis, DLD, glaucoma  CKD  presents to ED for right lower leg pain for one week.    #right hip pain, right leg pain, Right shoulder pain  - c/w with pain control, muscle relaxants   - physiatry/PT consult - home PT  - hip xr shoulder xr consistent without osteoarthitis/osteopenia   - rheum consult:  Pt's current symptoms are not suspicious for rheumatoid arthritis. Unclear what is causing current flare of pain even with significant osteoarthritis   - f/u uric acid, inflammatory markers, and cyclic citrullinated peptide  - patient states her pain is improved today     # - OMERO on CKD 4  # hyponatremia  - 3.8 on admission, previous baseline creatinine around 2.6-2.8  - currently 3.1  - OMERO on CKD vs CKD 4 progression  -neprho consult -> Dr. Urrutia   - f/u UA, urine Na, urea, creatinine  - check phos, iPTH  - sodium bicarb q8 650mg   - more hyponatremic today->will send TSh, cortisol, serum osm. Encourage PO intake     #hyperkalemia   - was 7.2 in ED with EKG changes, s/p Stat IV insulin and Calcium gluconate -> 5.4  - this AM now 5.8, will give insulin dextrose and start on standing lokelma once daily for now   - low K diet  - lokelma BID    #cystitis   - start cipro, renally dose   - f/u UCx    # ADHF / HFrEF, EF 25-30%  - TTE 3/2022: depressed LVEF 20-25%; moderate mitral and tricuspid regurgitation; enlarged LA  - c/w home dose of hydralazine, nitrates and toprol, lasix 20 mg po;  no ACEi/ARB/Entresto  - c/w Atorvastatin 20mg daily  - on aspirin, eliquis; no Plavix  - NO ACE inhibitors/ ARB/ Entresto/ spironolactone since patient can become severely hyperkalemic as per cardiology     #HTN  - Home meds: toprol, hydralazine and lasix  -NO ACE inhibitors/ ARB/ Entresto/ spironolactone since patient can become severely hyperkalemic     #Hx of PE/DVT  - Hx of provoked PE in the past and soleal vein thrombosis years ago?  - c/w eliquis 2.5 mg bid for now    #HDL  - c/w atorvastatin 20    #Glaucoma  - c/w eye drops    #hx Dysphagia / Anemia  - s/p EGD and colonoscopy in 2020 with non-specific gastritis and Diverticulosis  - Anemia can be due to CKD vs deficiency  - f/u iron panel  -NO BLOOD TRANSFUSIONS AS PER PT WISHES, JEHOVA'S WITNESS    DVT: Eliquis  GI: pantoprazole  Diet: DASH/TLC/Renal diet  Activity: Increase as tolerated  Dispo: Acute    pending: treat cystitis, f/u UCx, monitor creatinine, f/u neprho, physical therapy for pain

## 2022-04-28 NOTE — OCCUPATIONAL THERAPY INITIAL EVALUATION ADULT - ADDITIONAL COMMENTS
As per pt report, resides on 1 level in first floor apartment in private house. [facility]HA Mon- Sat 9-3. As per pt report, children perform cooking tasks. [facility] assists with baths, cleaning, laundry, taking pt to MD appointments. Is home bound. As per pt report, primarily uses rollator walker.

## 2022-04-28 NOTE — OCCUPATIONAL THERAPY INITIAL EVALUATION ADULT - GENERAL OBSERVATIONS, REHAB EVAL
Pt received and left seated in bedside chair. + IV lock, + cardiac monitor. c/o pain L LE 8/10 VAS at rest, 10/10 VAS with movement. As per pt, recently received medication for pain. Managed by rest. Pt reparted partial relief. Pt received and left seated in bedside chair. + IV lock, + cardiac monitor. c/o pain R LE and hip 8/10 VAS at rest, 10/10 VAS with movement. As per pt, recently received medication for pain. Managed by rest. Pt reparted partial relief.

## 2022-04-28 NOTE — OCCUPATIONAL THERAPY INITIAL EVALUATION ADULT - PERTINENT HX OF CURRENT PROBLEM, REHAB EVAL
86 year old female (jehovahs witness) with PMX HFrEF 25% s/p AICD, pulm HTN, provoked PE/DVT on eliquis, DLD, glaucoma  CKD  presents to ED for "right shoulder pain, radiating to elbow, radiating to hip and radiating to toes" for one week which was worsening since last night. Patient reports she had symptoms of such in the past; does not recall what she was diagnosed with. Patient reports pain worsening on ambulations, and having a great difficult ambulating.

## 2022-04-29 ENCOUNTER — TRANSCRIPTION ENCOUNTER (OUTPATIENT)
Age: 87
End: 2022-04-29

## 2022-04-29 LAB
ALBUMIN SERPL ELPH-MCNC: 3.1 G/DL — LOW (ref 3.5–5.2)
ALP SERPL-CCNC: 64 U/L — SIGNIFICANT CHANGE UP (ref 30–115)
ALT FLD-CCNC: 12 U/L — SIGNIFICANT CHANGE UP (ref 0–41)
ANION GAP SERPL CALC-SCNC: 10 MMOL/L — SIGNIFICANT CHANGE UP (ref 7–14)
AST SERPL-CCNC: 33 U/L — SIGNIFICANT CHANGE UP (ref 0–41)
BASOPHILS # BLD AUTO: 0.03 K/UL — SIGNIFICANT CHANGE UP (ref 0–0.2)
BASOPHILS NFR BLD AUTO: 0.5 % — SIGNIFICANT CHANGE UP (ref 0–1)
BILIRUB SERPL-MCNC: <0.2 MG/DL — SIGNIFICANT CHANGE UP (ref 0.2–1.2)
BUN SERPL-MCNC: 53 MG/DL — HIGH (ref 10–20)
CALCIUM SERPL-MCNC: 8.5 MG/DL — SIGNIFICANT CHANGE UP (ref 8.5–10.1)
CHLORIDE SERPL-SCNC: 96 MMOL/L — LOW (ref 98–110)
CO2 SERPL-SCNC: 26 MMOL/L — SIGNIFICANT CHANGE UP (ref 17–32)
CREAT SERPL-MCNC: 3.4 MG/DL — HIGH (ref 0.7–1.5)
EGFR: 13 ML/MIN/1.73M2 — LOW
EOSINOPHIL # BLD AUTO: 0.38 K/UL — SIGNIFICANT CHANGE UP (ref 0–0.7)
EOSINOPHIL NFR BLD AUTO: 6.2 % — SIGNIFICANT CHANGE UP (ref 0–8)
FERRITIN SERPL-MCNC: 257 NG/ML — HIGH (ref 15–150)
GLUCOSE SERPL-MCNC: 121 MG/DL — HIGH (ref 70–99)
HCT VFR BLD CALC: 27.6 % — LOW (ref 37–47)
HGB BLD-MCNC: 9 G/DL — LOW (ref 12–16)
IMM GRANULOCYTES NFR BLD AUTO: 0.3 % — SIGNIFICANT CHANGE UP (ref 0.1–0.3)
LYMPHOCYTES # BLD AUTO: 2.11 K/UL — SIGNIFICANT CHANGE UP (ref 1.2–3.4)
LYMPHOCYTES # BLD AUTO: 34.3 % — SIGNIFICANT CHANGE UP (ref 20.5–51.1)
MAGNESIUM SERPL-MCNC: 1.8 MG/DL — SIGNIFICANT CHANGE UP (ref 1.8–2.4)
MCHC RBC-ENTMCNC: 29.3 PG — SIGNIFICANT CHANGE UP (ref 27–31)
MCHC RBC-ENTMCNC: 32.6 G/DL — SIGNIFICANT CHANGE UP (ref 32–37)
MCV RBC AUTO: 89.9 FL — SIGNIFICANT CHANGE UP (ref 81–99)
MONOCYTES # BLD AUTO: 0.91 K/UL — HIGH (ref 0.1–0.6)
MONOCYTES NFR BLD AUTO: 14.8 % — HIGH (ref 1.7–9.3)
NEUTROPHILS # BLD AUTO: 2.71 K/UL — SIGNIFICANT CHANGE UP (ref 1.4–6.5)
NEUTROPHILS NFR BLD AUTO: 43.9 % — SIGNIFICANT CHANGE UP (ref 42.2–75.2)
NRBC # BLD: 0 /100 WBCS — SIGNIFICANT CHANGE UP (ref 0–0)
PLATELET # BLD AUTO: 181 K/UL — SIGNIFICANT CHANGE UP (ref 130–400)
POTASSIUM SERPL-MCNC: 4.8 MMOL/L — SIGNIFICANT CHANGE UP (ref 3.5–5)
POTASSIUM SERPL-SCNC: 4.8 MMOL/L — SIGNIFICANT CHANGE UP (ref 3.5–5)
PROT SERPL-MCNC: 5.8 G/DL — LOW (ref 6–8)
RBC # BLD: 3.07 M/UL — LOW (ref 4.2–5.4)
RBC # FLD: 13.6 % — SIGNIFICANT CHANGE UP (ref 11.5–14.5)
SODIUM SERPL-SCNC: 132 MMOL/L — LOW (ref 135–146)
WBC # BLD: 6.16 K/UL — SIGNIFICANT CHANGE UP (ref 4.8–10.8)
WBC # FLD AUTO: 6.16 K/UL — SIGNIFICANT CHANGE UP (ref 4.8–10.8)

## 2022-04-29 PROCEDURE — 99233 SBSQ HOSP IP/OBS HIGH 50: CPT

## 2022-04-29 RX ORDER — SODIUM ZIRCONIUM CYCLOSILICATE 10 G/10G
10 POWDER, FOR SUSPENSION ORAL DAILY
Refills: 0 | Status: DISCONTINUED | OUTPATIENT
Start: 2022-04-30 | End: 2022-05-01

## 2022-04-29 RX ORDER — SODIUM BICARBONATE 1 MEQ/ML
1 SYRINGE (ML) INTRAVENOUS
Qty: 90 | Refills: 0
Start: 2022-04-29 | End: 2022-05-28

## 2022-04-29 RX ORDER — POLYETHYLENE GLYCOL 3350 17 G/17G
17 POWDER, FOR SOLUTION ORAL
Qty: 238 | Refills: 0
Start: 2022-04-29 | End: 2022-05-12

## 2022-04-29 RX ORDER — ACETAMINOPHEN 500 MG
2 TABLET ORAL
Qty: 0 | Refills: 0 | DISCHARGE
Start: 2022-04-29

## 2022-04-29 RX ORDER — POLYETHYLENE GLYCOL 3350 17 G/17G
17 POWDER, FOR SOLUTION ORAL DAILY
Refills: 0 | Status: DISCONTINUED | OUTPATIENT
Start: 2022-04-29 | End: 2022-05-01

## 2022-04-29 RX ADMIN — Medication 25 MILLIGRAM(S): at 06:28

## 2022-04-29 RX ADMIN — CHLORHEXIDINE GLUCONATE 1 APPLICATION(S): 213 SOLUTION TOPICAL at 06:28

## 2022-04-29 RX ADMIN — METHOCARBAMOL 500 MILLIGRAM(S): 500 TABLET, FILM COATED ORAL at 06:28

## 2022-04-29 RX ADMIN — METHOCARBAMOL 500 MILLIGRAM(S): 500 TABLET, FILM COATED ORAL at 13:25

## 2022-04-29 RX ADMIN — ATORVASTATIN CALCIUM 20 MILLIGRAM(S): 80 TABLET, FILM COATED ORAL at 21:44

## 2022-04-29 RX ADMIN — Medication 650 MILLIGRAM(S): at 21:44

## 2022-04-29 RX ADMIN — SERTRALINE 50 MILLIGRAM(S): 25 TABLET, FILM COATED ORAL at 11:59

## 2022-04-29 RX ADMIN — Medication 1 DROP(S): at 06:28

## 2022-04-29 RX ADMIN — Medication 25 MILLIGRAM(S): at 21:44

## 2022-04-29 RX ADMIN — Medication 1 DROP(S): at 17:14

## 2022-04-29 RX ADMIN — APIXABAN 2.5 MILLIGRAM(S): 2.5 TABLET, FILM COATED ORAL at 06:28

## 2022-04-29 RX ADMIN — POLYETHYLENE GLYCOL 3350 17 GRAM(S): 17 POWDER, FOR SOLUTION ORAL at 13:24

## 2022-04-29 RX ADMIN — APIXABAN 2.5 MILLIGRAM(S): 2.5 TABLET, FILM COATED ORAL at 17:14

## 2022-04-29 RX ADMIN — LATANOPROST 1 DROP(S): 0.05 SOLUTION/ DROPS OPHTHALMIC; TOPICAL at 21:44

## 2022-04-29 RX ADMIN — METHOCARBAMOL 500 MILLIGRAM(S): 500 TABLET, FILM COATED ORAL at 21:44

## 2022-04-29 RX ADMIN — Medication 650 MILLIGRAM(S): at 13:25

## 2022-04-29 RX ADMIN — Medication 10 MILLIGRAM(S): at 16:25

## 2022-04-29 RX ADMIN — Medication 650 MILLIGRAM(S): at 06:28

## 2022-04-29 RX ADMIN — Medication 20 MILLIGRAM(S): at 06:27

## 2022-04-29 RX ADMIN — ISOSORBIDE MONONITRATE 30 MILLIGRAM(S): 60 TABLET, EXTENDED RELEASE ORAL at 11:59

## 2022-04-29 RX ADMIN — Medication 250 MILLIGRAM(S): at 11:59

## 2022-04-29 RX ADMIN — Medication 25 MILLIGRAM(S): at 13:26

## 2022-04-29 NOTE — PROGRESS NOTE ADULT - ASSESSMENT
86 year old female ([place]) with PMX HFrEF 25%, s/p AICD, pulm HTN, provoked PE/DVT on Eliquis, DLD, glaucoma and CKD 4 presents to ED for "right shoulder pain, radiating to elbow, radiating to hip and radiating to toes" for one week which was worsening since last night.    Hyperkalemia  Hyponatremia - improved  OMERO on CKD-4  Chronic HFrEF S/P AICD  Pulmonary HTN  Chronic DVT on Eliquis  RA  UTI  constipation              PLAN:    ·	Cont telemetry - has NSVT  ·	Nephrology f/u appreciated   ·	Cr trended up today  ·	hold torsemide  ·	BMP in AM  ·	decrease lokelma to once a day - hyperkalemia improving  ·	Low K diet  ·	renal US: no hydronephrosis  ·	Water restriction to 1L per day  ·	No ACE-I or ARB  ·	Rheumatology eval noted. Unlikely RA. rule out crystal induced inflammation  ·	ESR 80, CRP<3, uric acid 7.6  ·	f/u CCP ab  ·	X-Ray R shoulder and hip reviewed. Osteoarthritis, osteopenia, degenerative changes  ·	Pain control  ·	consider short course of steroids if c/o pain  ·	Cont Eliquis   ·	PT eval noted. Recommended home with home services.   ·	For UTI, on PO Cipro 250 mg po daily and Check urine culture  ·	dulcolax suppository, miralax and monitor BM's and for resolution of abdominal pain      PROGRESS NOTE HANDOFF    Pending: BMP in AM, urine culture, resolution of constipation    Disposition: home with services possibly in 24hrs

## 2022-04-29 NOTE — PROGRESS NOTE ADULT - SUBJECTIVE AND OBJECTIVE BOX
RACHEL SUMMERS 86y Female  MRN#: [id number]   Hospital Day: 4d    SUBJECTIVE  Patient is a 86y old Female who presents with a chief complaint of right lower leg pain/social admit (28 Apr 2022 16:07)  Currently admitted to medicine with the primary diagnosis of Hyperkalemia      INTERVAL HPI AND OVERNIGHT EVENTS:  Patient was examined and seen at bedside. This morning she is resting comfortably in bed. No issues or overnight events.    OBJECTIVE  PAST MEDICAL & SURGICAL HISTORY  Chronic kidney disease    Pacemaker    Hypertension    Gout    Diverticulitis  sigmoid    Diastolic heart failure    Rheumatoid arthritis    DVT, lower extremity    Artificial pacemaker    History of appendectomy    History of tonsillectomy    History of hysterectomy      ALLERGIES:  Keflex (Swelling)    MEDICATIONS:  STANDING MEDICATIONS  apixaban 2.5 milliGRAM(s) Oral two times a day  atorvastatin 20 milliGRAM(s) Oral at bedtime  chlorhexidine 4% Liquid 1 Application(s) Topical <User Schedule>  ciprofloxacin     Tablet 250 milliGRAM(s) Oral daily  hydrALAZINE 25 milliGRAM(s) Oral three times a day  isosorbide   mononitrate ER Tablet (IMDUR) 30 milliGRAM(s) Oral daily  latanoprost 0.005% Ophthalmic Solution 1 Drop(s) Both EYES at bedtime  methocarbamol 500 milliGRAM(s) Oral every 8 hours  metoprolol succinate ER 25 milliGRAM(s) Oral daily  sertraline 50 milliGRAM(s) Oral daily  sodium bicarbonate 650 milliGRAM(s) Oral three times a day  sodium zirconium cyclosilicate 10 Gram(s) Oral two times a day  timolol 0.5% Solution 1 Drop(s) Both EYES two times a day  torsemide 20 milliGRAM(s) Oral daily    PRN MEDICATIONS  acetaminophen     Tablet .. 650 milliGRAM(s) Oral every 6 hours PRN  aluminum hydroxide/magnesium hydroxide/simethicone Suspension 30 milliLiter(s) Oral every 4 hours PRN  melatonin 3 milliGRAM(s) Oral at bedtime PRN  ondansetron Injectable 4 milliGRAM(s) IV Push every 8 hours PRN      VITAL SIGNS: Last 24 Hours  T(C): 36.4 (29 Apr 2022 05:47), Max: 36.9 (28 Apr 2022 14:07)  T(F): 97.6 (29 Apr 2022 05:47), Max: 98.4 (28 Apr 2022 14:07)  HR: 67 (29 Apr 2022 05:47) (66 - 68)  BP: 145/67 (29 Apr 2022 05:47) (136/71 - 148/68)  BP(mean): --  RR: 18 (29 Apr 2022 05:47) (16 - 18)  SpO2: --    LABS:                        9.0    6.16  )-----------( 181      ( 29 Apr 2022 07:17 )             27.6     04-29    132<L>  |  96<L>  |  53<H>  ----------------------------<  121<H>  4.8   |  26  |  3.4<H>    Ca    8.5      29 Apr 2022 07:17  Mg     1.8     04-29    TPro  5.8<L>  /  Alb  3.1<L>  /  TBili  <0.2  /  DBili  x   /  AST  33  /  ALT  12  /  AlkPhos  64  04-29                  RADIOLOGY:      PHYSICAL EXAM:  CONSTITUTIONAL: No acute distress, AAOx3  HEAD: Atraumatic, normocephalic  EYES: EOM intact, PERRLA, conjunctiva and sclera clear  ENT: Supple, no masses, no thyromegaly, no bruits, no JVD; moist mucous membranes  PULMONARY: Clear to auscultation bilaterally; no wheezes, rales, or rhonchi  CARDIOVASCULAR: Regular rate and rhythm; no murmurs, rubs, or gallops  GASTROINTESTINAL: Soft, non-tender, non-distended; bowel sounds present  MUSCULOSKELETAL: 2+ peripheral pulses; no clubbing, no cyanosis, no edema. R hip tender to palpation   NEUROLOGY: non-focal, moves all extremitites   SKIN: No rashes or lesions; warm and dry. Noted swelling/nodules in b/l hands

## 2022-04-29 NOTE — DISCHARGE NOTE PROVIDER - NSDCMRMEDTOKEN_GEN_ALL_CORE_FT
acetaminophen 325 mg oral tablet: 2 tab(s) orally every 6 hours, As needed, Temp greater or equal to 38C (100.4F), Mild Pain (1 - 3)  apixaban 2.5 mg oral tablet: 1 tab(s) orally 2 times a day  bisacodyl 10 mg rectal suppository: 1 suppository(ies) rectal once a day, As needed, Constipation  furosemide 20 mg oral tablet: 1 tab(s) orally once a day  hydrALAZINE 25 mg oral tablet: 1 tab(s) orally 3 times a day  isosorbide mononitrate 30 mg oral tablet, extended release: 1 tab(s) orally once a day (in the morning)  latanoprost 0.005% ophthalmic solution: 1 drop(s) to each affected eye 2 times a day  Lipitor 20 mg oral tablet: 1 tab(s) orally once a day (at bedtime)  Metoprolol Succinate ER 25 mg oral tablet, extended release: 1 tab(s) orally once a day  PARICALCITOL 1 MCG CAP:   polyethylene glycol 3350 oral powder for reconstitution: 17 gram(s) orally once a day  sodium bicarbonate 650 mg oral tablet: 1 tab(s) orally 3 times a day  Timolol Maleate (Eqv-Timoptic) 0.5% ophthalmic solution: 1 drop(s) to each affected eye 2 times a day  Zoloft 50 mg oral tablet: 1 tab(s) orally once a day   acetaminophen 325 mg oral tablet: 2 tab(s) orally every 6 hours, As needed, Temp greater or equal to 38C (100.4F), Mild Pain (1 - 3)  apixaban 2.5 mg oral tablet: 1 tab(s) orally 2 times a day  bisacodyl 10 mg rectal suppository: 1 suppository(ies) rectal once a day, As needed, Constipation  furosemide 20 mg oral tablet: 1 tab(s) orally once a day  hydrALAZINE 25 mg oral tablet: 1 tab(s) orally 3 times a day  isosorbide mononitrate 30 mg oral tablet, extended release: 1 tab(s) orally once a day (in the morning)  latanoprost 0.005% ophthalmic solution: 1 drop(s) to each affected eye 2 times a day  Lipitor 20 mg oral tablet: 1 tab(s) orally once a day (at bedtime)  Metoprolol Succinate ER 25 mg oral tablet, extended release: 1 tab(s) orally once a day  polyethylene glycol 3350 oral powder for reconstitution: 17 gram(s) orally once a day  sodium zirconium cyclosilicate 5 g oral powder for reconstitution: 5 gram(s) orally once a day   Timolol Maleate (Eqv-Timoptic) 0.5% ophthalmic solution: 1 drop(s) to each affected eye 2 times a day  Zoloft 50 mg oral tablet: 1 tab(s) orally once a day

## 2022-04-29 NOTE — PROGRESS NOTE ADULT - SUBJECTIVE AND OBJECTIVE BOX
RACHEL SUMMERS  86y Female    INTERVAL HPI/OVERNIGHT EVENTS:    pt c/o lower abdominal pain when seen this morning  had small, hard BM today  denies fever, chest pain, SOB, N/V  ate breakfast  ROS negative    T(F): 97.2 (22 @ 12:48), Max: 97.6 (22 @ 05:47)  HR: 68 (22 @ 12:48) (66 - 68)  BP: 159/70 (22 @ 12:48) (145/67 - 159/70)  RR: 16 (22 @ 12:48) (16 - 18)  SpO2: --    I&O's Summary    2022 07:  -  2022 07:00  --------------------------------------------------------  IN: 200 mL / OUT: 350 mL / NET: -150 mL    2022 07:  -  2022 14:58  --------------------------------------------------------  IN: 0 mL / OUT: 200 mL / NET: -200 mL      Daily Weight in k.3 (2022 05:47)    CAPILLARY BLOOD GLUCOSE      POCT Blood Glucose.: 116 mg/dL (2022 16:42)        PHYSICAL EXAM:  GENERAL: NAD  HEAD:  Normocephalic  EYES:  conjunctiva and sclera clear  ENMT: Moist mucous membranes  NECK: Supple, No JVD  NERVOUS SYSTEM:  Alert, awake, Good concentration  CHEST/LUNG: CTA b/l  HEART: Regular rate and rhythm  ABDOMEN: Soft, tender in the lower quadrants, no guarding, mildly distended;   Bowel sounds present  EXTREMITIES: No edema  SKIN: warm, dry    Consultant(s) Notes Reviewed:  [x ] YES  [ ] NO  Care Discussed with Consultants/Other Providers [ x] YES  [ ] NO    MEDICATIONS  (STANDING):  apixaban 2.5 milliGRAM(s) Oral two times a day  atorvastatin 20 milliGRAM(s) Oral at bedtime  chlorhexidine 4% Liquid 1 Application(s) Topical <User Schedule>  ciprofloxacin     Tablet 250 milliGRAM(s) Oral daily  hydrALAZINE 25 milliGRAM(s) Oral three times a day  isosorbide   mononitrate ER Tablet (IMDUR) 30 milliGRAM(s) Oral daily  latanoprost 0.005% Ophthalmic Solution 1 Drop(s) Both EYES at bedtime  methocarbamol 500 milliGRAM(s) Oral every 8 hours  metoprolol succinate ER 25 milliGRAM(s) Oral daily  polyethylene glycol 3350 17 Gram(s) Oral daily  sertraline 50 milliGRAM(s) Oral daily  sodium bicarbonate 650 milliGRAM(s) Oral three times a day  sodium zirconium cyclosilicate 10 Gram(s) Oral two times a day  timolol 0.5% Solution 1 Drop(s) Both EYES two times a day    MEDICATIONS  (PRN):  acetaminophen     Tablet .. 650 milliGRAM(s) Oral every 6 hours PRN Temp greater or equal to 38C (100.4F), Mild Pain (1 - 3)  aluminum hydroxide/magnesium hydroxide/simethicone Suspension 30 milliLiter(s) Oral every 4 hours PRN Dyspepsia  bisacodyl Suppository 10 milliGRAM(s) Rectal daily PRN Constipation  melatonin 3 milliGRAM(s) Oral at bedtime PRN Insomnia  ondansetron Injectable 4 milliGRAM(s) IV Push every 8 hours PRN Nausea and/or Vomiting      Telemetry reviewed by me - some episodes of NSVT    LABS:                        9.0    6.16  )-----------( 181      ( 2022 07:17 )             27.6         132<L>  |  96<L>  |  53<H>  ----------------------------<  121<H>  4.8   |  26  |  3.4<H>    Ca    8.5      2022 07:17  Mg     1.8         TPro  5.8<L>  /  Alb  3.1<L>  /  TBili  <0.2  /  DBili  x   /  AST  33  /  ALT  12  /  AlkPhos  64                RADIOLOGY & ADDITIONAL TESTS:    Imaging or report Personally Reviewed:  [x ] YES  [ ] NO    < from: US Kidney and Bladder (22 @ 14:26) >  IMPRESSION:    Stable echogenic kidneys, compatible with medical renal disease. No   hydronephrosis bilaterally.    Bilateral renal cysts.    < end of copied text >  < from: Xray Chest 1 View- PORTABLE-Routine (Xray Chest 1 View- PORTABLE-Routine .) (22 @ 13:24) >  Impression:    Cardiomegaly without acute opacifications or effusions.    < end of copied text >        < from: TTE Echo Complete w/o Contrast w/ Doppler (22 @ 09:56) >  Summary:   1. Left ventricular ejection fraction, by visual estimation, is 25 to   30%.   2. Moderately to severely decreased global left ventricular systolic   function.   3. Spectral Doppler shows impaired relaxation pattern of left   ventricular myocardial filling (Grade I diastolic dysfunction).   4. Moderately enlarged leftatrium.   5. Normal right atrial size.   6. Mild thickening of the anterior and posterior mitral valve leaflets.   7. Moderate mitral valve regurgitation.   8. Severe mitral annular calcification.   9. Moderate tricuspid regurgitation.  10. PSAP 40.  11. Mild pulmonic valve regurgitation.    < end of copied text >      Case discussed with residents and RN on rounds today    Care discussed with pt

## 2022-04-29 NOTE — PROGRESS NOTE ADULT - ASSESSMENT
86 year old female (jehovahs witness) with PMX HFrEF 25% s/p AICD, pulm HTN, provoked PE/DVT on eliquis, DLD, glaucoma  CKD  presents to ED for right lower leg pain for one week.    #right hip pain, right leg pain, Right shoulder pain  - c/w with pain control, muscle relaxants   - physiatry/PT consult - home PT  - hip xr shoulder xr consistent without osteoarthitis/osteopenia   - rheum consult:  Pt's current symptoms are not suspicious for rheumatoid arthritis. Unclear what is causing current flare of pain even with significant osteoarthritis   - f/u uric acid, inflammatory markers, and cyclic citrullinated peptide  - patient states her pain is improved today     # - OMERO on CKD 4  # hyponatremia - improving   - 3.8 on admission, previous baseline creatinine around 2.6-2.8  - OMERO on CKD vs CKD 4 progression  - neprho consult -> Dr. Urrutia   - increased from yesterday 3.1->3.4  - sodium bicarb q8 650mg   - f/u nephro    #hyperkalemia   - was 7.2 in ED with EKG changes, s/p Stat IV insulin and Calcium gluconate -> 5.4. improving   - low K diet  - lokelma BID    #cystitis   - start cipro, renally dose   - f/u UCx    # ADHF / HFrEF, EF 25-30%  - TTE 3/2022: depressed LVEF 20-25%; moderate mitral and tricuspid regurgitation; enlarged LA  - c/w home dose of hydralazine, nitrates and toprol, lasix 20 mg po;  no ACEi/ARB/Entresto  - c/w Atorvastatin 20mg daily  - on aspirin, eliquis; no Plavix  - NO ACE inhibitors/ ARB/ Entresto/ spironolactone since patient can become severely hyperkalemic as per cardiology     #HTN  - Home meds: toprol, hydralazine and lasix  -NO ACE inhibitors/ ARB/ Entresto/ spironolactone since patient can become severely hyperkalemic     #Hx of PE/DVT  - Hx of provoked PE in the past and soleal vein thrombosis years ago?  - c/w eliquis 2.5 mg bid for now    #HDL  - c/w atorvastatin 20    #Glaucoma  - c/w eye drops    #hx Dysphagia / Anemia  - s/p EGD and colonoscopy in 2020 with non-specific gastritis and Diverticulosis  - Anemia can be due to CKD vs deficiency  - f/u iron panel  -NO BLOOD TRANSFUSIONS AS PER PT WISHES, JEHOVA'S WITNESS    DVT: Eliquis  GI: pantoprazole  Diet: DASH/TLC/Renal diet  Activity: Increase as tolerated  Dispo: Acute    pending: treat cystitis, f/u UCx, monitor creatinine, f/u neprho, physical therapy for pain

## 2022-04-29 NOTE — DISCHARGE NOTE PROVIDER - NSDCCPCAREPLAN_GEN_ALL_CORE_FT
PRINCIPAL DISCHARGE DIAGNOSIS  Diagnosis: Hyperkalemia  Assessment and Plan of Treatment: You had very high potassium. It is secondary to your kidney disease. We have you medication to bring it down and it improved. Please follow up with your nephrologist. Take all medications as instructed.      SECONDARY DISCHARGE DIAGNOSES  Diagnosis: Back pain  Assessment and Plan of Treatment:     Diagnosis: Stage 4 chronic kidney disease  Assessment and Plan of Treatment: You were noted to have a temporary insult to your kidney function either at the time that you arrived at the hospital or during your stay here on top of of your CKD. We have monitored your kidney function with blood work during your time here and you are at a level that no longer requires continued hospital level care. We do recommend that you follow up to continually have your kidney function checked. You should follow up with a kidney specialist called a nephrologist. Take all medications as instructed.

## 2022-04-29 NOTE — DISCHARGE NOTE PROVIDER - NSDCFUSCHEDAPPT_GEN_ALL_CORE_FT
Jean-Claude Cline  [facility]  Surg Vasc [address]  Scheduled Appointment: 06/09/2022    Sterling Mckinney  [facility]  Cardio [address]  Scheduled Appointment: 06/28/2022

## 2022-04-29 NOTE — PROGRESS NOTE ADULT - ASSESSMENT
# OMERO / CKD (follows w/ Dr Willard Urrutia)   # Pyuria / R lank pain  # Hyponatremia  # Hyperkalemia   # Metabolic acidosis  # HTN  # Normocytic anemia   - cr up today  - hold torsemide for now   - limit fluid intake to 1 liter daily  - check urine cx, start abx coverage for UTI once urine cx obtained  - renal diet / ensure pt is on low K  -  can decrease lokelma to daily  -   sodium bicarb 650 mg q8h  - phos level at goal, no need for phos binder  - check iron stores including ferritin    - rheumatology notes appreciated   -will follow

## 2022-04-29 NOTE — PROGRESS NOTE ADULT - SUBJECTIVE AND OBJECTIVE BOX
Nephrology progress note    Patient was seen and examined, events over the last 24 h noted .  Cr relativly stable     Allergies:  Keflex (Swelling)    Hospital Medications:   MEDICATIONS  (STANDING):  apixaban 2.5 milliGRAM(s) Oral two times a day  atorvastatin 20 milliGRAM(s) Oral at bedtime  chlorhexidine 4% Liquid 1 Application(s) Topical <User Schedule>  ciprofloxacin     Tablet 250 milliGRAM(s) Oral daily  hydrALAZINE 25 milliGRAM(s) Oral three times a day  isosorbide   mononitrate ER Tablet (IMDUR) 30 milliGRAM(s) Oral daily  latanoprost 0.005% Ophthalmic Solution 1 Drop(s) Both EYES at bedtime  methocarbamol 500 milliGRAM(s) Oral every 8 hours  metoprolol succinate ER 25 milliGRAM(s) Oral daily  sertraline 50 milliGRAM(s) Oral daily  sodium bicarbonate 650 milliGRAM(s) Oral three times a day  sodium zirconium cyclosilicate 10 Gram(s) Oral two times a day  timolol 0.5% Solution 1 Drop(s) Both EYES two times a day  torsemide 20 milliGRAM(s) Oral daily        VITALS:  T(F): 97.6 (22 @ 05:47), Max: 98.4 (22 @ 14:07)  HR: 67 (22 @ 05:47)  BP: 145/67 (22 @ 05:47)  RR: 18 (22 @ 05:47)  SpO2: --  Wt(kg): --     @ 07:  -   @ 07:00  --------------------------------------------------------  IN: 472 mL / OUT: 600 mL / NET: -128 mL     @ 07:01  -   @ 07:00  --------------------------------------------------------  IN: 200 mL / OUT: 350 mL / NET: -150 mL     @ 07:01  -   @ 11:51  --------------------------------------------------------  IN: 0 mL / OUT: 200 mL / NET: -200 mL          PHYSICAL EXAM:  Constitutional: NAD  HEENT: anicteric sclera, oropharynx clear, MMM  Neck: No JVD  Respiratory: CTAB, no wheezes, rales or rhonchi  Cardiovascular: S1, S2, RRR  Gastrointestinal: BS+, soft, NT/ND  Extremities: No cyanosis or clubbing. No peripheral edema  :  No carlos.   Skin: No rashes    LABS:      132<L>  |  96<L>  |  53<H>  ----------------------------<  121<H>  4.8   |  26  |  3.4<H>    Ca    8.5      2022 07:17  Mg     1.8         TPro  5.8<L>  /  Alb  3.1<L>  /  TBili  <0.2  /  DBili      /  AST  33  /  ALT  12  /  AlkPhos  64                            9.0    6.16  )-----------( 181      ( 2022 07:17 )             27.6       Urine Studies:  Urinalysis Basic - ( 2022 11:30 )    Color: Light Yellow / Appearance: Slightly Turbid / S.007 / pH:   Gluc:  / Ketone: Negative  / Bili: Negative / Urobili: <2 mg/dL   Blood:  / Protein: 30 mg/dL / Nitrite: Positive   Leuk Esterase: Large / RBC: 0 /HPF /  /HPF   Sq Epi:  / Non Sq Epi: 1 /HPF / Bacteria: Few      Osmolality, Random Urine: 264 mos/kg ( @ 11:30)  Potassium, Random Urine: 20 mmol/L ( @ 11:30)  Creatinine, Random Urine: 27 mg/dL ( @ 11:30)  Creatinine, Random Urine: 56 mg/dL ( @ 00:49)  Protein/Creatinine Ratio Calculation: 0.9 Ratio ( @ 00:49)  Sodium, Random Urine: 92.0 mmoL/L ( @ 00:49)  Osmolality, Random Urine: 322 mos/kg ( @ 00:49)    RADIOLOGY & ADDITIONAL STUDIES:

## 2022-04-29 NOTE — DISCHARGE NOTE PROVIDER - PROVIDER TOKENS
PROVIDER:[TOKEN:[[id number]],FOLLOWUP:[2 weeks]] PROVIDER:[TOKEN:[[id number]],FOLLOWUP:[1 week]]

## 2022-04-29 NOTE — DISCHARGE NOTE PROVIDER - HOSPITAL COURSE
Mrs. Marrufo 86 year old female (jehovahs witness) with PMX HFrEF 25% s/p AICD, pulm HTN, provoked PE/DVT on eliquis, DLD, glaucoma  CKD  presents to ED for "right shoulder pain, radiating to elbow, radiating to hip and radiating to toes" for one week which was worsening since last night. Patient reports she had symptoms of such in the past; does not recall what she was diagnosed with. Patient reports pain worsening on ambulations, and having a great difficult ambulating. Usually walks with assistance and a cane which has worsened. Patient reports pain improves on rest. Patient denies headaches, vision changes, sob, dizziness, chest pain.   Of note patient hospitalized multiple times in past; recently admitted 3/28/2022 for atypical chest pain most likely musculoskeletal no ekg changes and advised to follow up with cardiologist (Dr. Mckinney) outpatient.     In the ED, vitals stable. Labs significant for Na 131, K 6 (hemolyzed), creat 3.8 (previously 2.7), trop 0.02 (previously 0.02 1 month ago). Patient was given 10 units regular insulin, dextrose, calcium gluconate, morphine, Tylenol zofran and 1 L bolus of NS in ED. Patient currently admitted to medicine for further management.     For her right hip pain, right leg pain, Right shoulder pain her pain improved with tylenol and muscle relaxants.   hip xr shoulder xr consistent without osteoarthitis/osteopenia . physiatry/PT consult recommended home PT. rheum consulted, Pt's current symptoms are not suspicious for rheumatoid arthritis. Unclear what is causing current flare of pain even with significant osteoarthritis. no rheumatologic intervention at this time.     Nephro following for OMERO on CKD vs CKD 4 progression. Cr 3.8 on admission, previous baseline creatinine around 2.6-2.8 Follows Dr. Urrutia outpatient. No need for RRT at this time. Torsemide was held started on sodium bicarb q8 650mg. Cr improved but still elevated. Still making good urine output. May reflect new baseline, Op follow up.     hyperkalemia with EKG changes. Was 7.2 in ED with EKG changes, s/p Stat IV insulin and Calcium gluconate. improved to 4.8, on lokelma BId. Started on low potassium and renal diet.     Found to have cystitis, started on 3 day course cipro, renally dose     Pending: follow up Cr in Am, discuss with nephro if ok to restart torsemide and lokelma    Patient is medically stable and ready for discharge. Mrs. Marrufo 86 year old female (jehovahs witness) with PMX HFrEF 25% s/p AICD, pulm HTN, provoked PE/DVT on eliquis, DLD, glaucoma  CKD  presents to ED for "right shoulder pain, radiating to elbow, radiating to hip and radiating to toes" for one week which was worsening since last night. Patient reports she had symptoms of such in the past; does not recall what she was diagnosed with. Patient reports pain worsening on ambulations, and having a great difficult ambulating. Usually walks with assistance and a cane which has worsened. Patient reports pain improves on rest. Patient denies headaches, vision changes, sob, dizziness, chest pain.   Of note patient hospitalized multiple times in past; recently admitted 3/28/2022 for atypical chest pain most likely musculoskeletal no ekg changes and advised to follow up with cardiologist (Dr. Mckinney) outpatient.     In the ED, vitals stable. Labs significant for Na 131, K 6 (hemolyzed), creat 3.8 (previously 2.7), trop 0.02 (previously 0.02 1 month ago). Patient was given 10 units regular insulin, dextrose, calcium gluconate, morphine, Tylenol zofran and 1 L bolus of NS in ED. Patient currently admitted to medicine for further management.     For her right hip pain, right leg pain, Right shoulder pain her pain improved with tylenol and muscle relaxants.   hip xr shoulder xr consistent without osteoarthitis/osteopenia . physiatry/PT consult recommended home PT. rheum consulted, Pt's current symptoms are not suspicious for rheumatoid arthritis. Unclear what is causing current flare of pain even with significant osteoarthritis. no rheumatologic intervention at this time.     Nephro following for OMERO on CKD vs CKD 4 progression. Cr 3.8 on admission, previous baseline creatinine around 2.6-2.8 Follows Dr. Urrutia outpatient. No need for RRT at this time. Torsemide was held started on sodium bicarb q8 650mg. Cr improved but still elevated. Still making good urine output. May reflect new baseline, Op follow up.     hyperkalemia with EKG changes. Was 7.2 in ED with EKG changes, s/p Stat IV insulin and Calcium gluconate. improved to 4.8, on lokelma BId. Started on low potassium and renal diet.     Found to have cystitis, started on 3 day course cipro, renally dose     Patient is medically stable and ready for discharge.

## 2022-04-29 NOTE — DISCHARGE NOTE PROVIDER - CARE PROVIDER_API CALL
Sukhjinder Urrutia)  Nephrology  [address] [place] [address], [place] [address]  Phone: [phone]  Fax: [phone]  Follow Up Time: 2 weeks   Sukhjinder Urrutia)  Nephrology  [address] [place] [address], [place] [address]  Phone: [phone]  Fax: [phone]  Follow Up Time: 1 week

## 2022-04-30 ENCOUNTER — TRANSCRIPTION ENCOUNTER (OUTPATIENT)
Age: 87
End: 2022-04-30

## 2022-04-30 LAB
ALBUMIN SERPL ELPH-MCNC: 3.4 G/DL — LOW (ref 3.5–5.2)
ALP SERPL-CCNC: 74 U/L — SIGNIFICANT CHANGE UP (ref 30–115)
ALT FLD-CCNC: 13 U/L — SIGNIFICANT CHANGE UP (ref 0–41)
ANION GAP SERPL CALC-SCNC: 12 MMOL/L — SIGNIFICANT CHANGE UP (ref 7–14)
AST SERPL-CCNC: 34 U/L — SIGNIFICANT CHANGE UP (ref 0–41)
BILIRUB SERPL-MCNC: <0.2 MG/DL — SIGNIFICANT CHANGE UP (ref 0.2–1.2)
BUN SERPL-MCNC: 51 MG/DL — HIGH (ref 10–20)
CALCIUM SERPL-MCNC: 8.6 MG/DL — SIGNIFICANT CHANGE UP (ref 8.5–10.1)
CHLORIDE SERPL-SCNC: 94 MMOL/L — LOW (ref 98–110)
CO2 SERPL-SCNC: 25 MMOL/L — SIGNIFICANT CHANGE UP (ref 17–32)
CREAT SERPL-MCNC: 3 MG/DL — HIGH (ref 0.7–1.5)
EGFR: 15 ML/MIN/1.73M2 — LOW
GLUCOSE SERPL-MCNC: 104 MG/DL — HIGH (ref 70–99)
HCT VFR BLD CALC: 28.5 % — LOW (ref 37–47)
HGB BLD-MCNC: 9.2 G/DL — LOW (ref 12–16)
MAGNESIUM SERPL-MCNC: 1.8 MG/DL — SIGNIFICANT CHANGE UP (ref 1.8–2.4)
MCHC RBC-ENTMCNC: 29.3 PG — SIGNIFICANT CHANGE UP (ref 27–31)
MCHC RBC-ENTMCNC: 32.3 G/DL — SIGNIFICANT CHANGE UP (ref 32–37)
MCV RBC AUTO: 90.8 FL — SIGNIFICANT CHANGE UP (ref 81–99)
NRBC # BLD: 0 /100 WBCS — SIGNIFICANT CHANGE UP (ref 0–0)
PLATELET # BLD AUTO: 203 K/UL — SIGNIFICANT CHANGE UP (ref 130–400)
POTASSIUM SERPL-MCNC: 4.8 MMOL/L — SIGNIFICANT CHANGE UP (ref 3.5–5)
POTASSIUM SERPL-SCNC: 4.8 MMOL/L — SIGNIFICANT CHANGE UP (ref 3.5–5)
PROT SERPL-MCNC: 6.2 G/DL — SIGNIFICANT CHANGE UP (ref 6–8)
RBC # BLD: 3.14 M/UL — LOW (ref 4.2–5.4)
RBC # FLD: 13.7 % — SIGNIFICANT CHANGE UP (ref 11.5–14.5)
SODIUM SERPL-SCNC: 131 MMOL/L — LOW (ref 135–146)
WBC # BLD: 6.66 K/UL — SIGNIFICANT CHANGE UP (ref 4.8–10.8)
WBC # FLD AUTO: 6.66 K/UL — SIGNIFICANT CHANGE UP (ref 4.8–10.8)

## 2022-04-30 PROCEDURE — 99232 SBSQ HOSP IP/OBS MODERATE 35: CPT

## 2022-04-30 RX ORDER — PARICALCITOL 2 UG/1
0 CAPSULE, GELATIN COATED ORAL
Qty: 0 | Refills: 0 | DISCHARGE

## 2022-04-30 RX ORDER — SENNA PLUS 8.6 MG/1
2 TABLET ORAL AT BEDTIME
Refills: 0 | Status: DISCONTINUED | OUTPATIENT
Start: 2022-04-30 | End: 2022-05-01

## 2022-04-30 RX ORDER — SODIUM ZIRCONIUM CYCLOSILICATE 10 G/10G
5 POWDER, FOR SUSPENSION ORAL
Qty: 70 | Refills: 0
Start: 2022-04-30 | End: 2022-05-13

## 2022-04-30 RX ADMIN — Medication 650 MILLIGRAM(S): at 18:03

## 2022-04-30 RX ADMIN — APIXABAN 2.5 MILLIGRAM(S): 2.5 TABLET, FILM COATED ORAL at 17:29

## 2022-04-30 RX ADMIN — SODIUM ZIRCONIUM CYCLOSILICATE 10 GRAM(S): 10 POWDER, FOR SUSPENSION ORAL at 12:48

## 2022-04-30 RX ADMIN — METHOCARBAMOL 500 MILLIGRAM(S): 500 TABLET, FILM COATED ORAL at 14:07

## 2022-04-30 RX ADMIN — SENNA PLUS 2 TABLET(S): 8.6 TABLET ORAL at 23:11

## 2022-04-30 RX ADMIN — METHOCARBAMOL 500 MILLIGRAM(S): 500 TABLET, FILM COATED ORAL at 23:10

## 2022-04-30 RX ADMIN — METHOCARBAMOL 500 MILLIGRAM(S): 500 TABLET, FILM COATED ORAL at 05:57

## 2022-04-30 RX ADMIN — ISOSORBIDE MONONITRATE 30 MILLIGRAM(S): 60 TABLET, EXTENDED RELEASE ORAL at 12:45

## 2022-04-30 RX ADMIN — Medication 650 MILLIGRAM(S): at 17:38

## 2022-04-30 RX ADMIN — Medication 25 MILLIGRAM(S): at 05:57

## 2022-04-30 RX ADMIN — APIXABAN 2.5 MILLIGRAM(S): 2.5 TABLET, FILM COATED ORAL at 05:57

## 2022-04-30 RX ADMIN — Medication 25 MILLIGRAM(S): at 14:07

## 2022-04-30 RX ADMIN — Medication 650 MILLIGRAM(S): at 23:10

## 2022-04-30 RX ADMIN — Medication 1 DROP(S): at 05:59

## 2022-04-30 RX ADMIN — SERTRALINE 50 MILLIGRAM(S): 25 TABLET, FILM COATED ORAL at 12:45

## 2022-04-30 RX ADMIN — Medication 650 MILLIGRAM(S): at 05:58

## 2022-04-30 RX ADMIN — ATORVASTATIN CALCIUM 20 MILLIGRAM(S): 80 TABLET, FILM COATED ORAL at 23:10

## 2022-04-30 RX ADMIN — Medication 1 DROP(S): at 17:29

## 2022-04-30 RX ADMIN — CHLORHEXIDINE GLUCONATE 1 APPLICATION(S): 213 SOLUTION TOPICAL at 05:59

## 2022-04-30 RX ADMIN — LATANOPROST 1 DROP(S): 0.05 SOLUTION/ DROPS OPHTHALMIC; TOPICAL at 23:14

## 2022-04-30 RX ADMIN — Medication 250 MILLIGRAM(S): at 12:45

## 2022-04-30 RX ADMIN — Medication 650 MILLIGRAM(S): at 14:07

## 2022-04-30 RX ADMIN — Medication 25 MILLIGRAM(S): at 05:58

## 2022-04-30 RX ADMIN — Medication 25 MILLIGRAM(S): at 23:11

## 2022-04-30 NOTE — DISCHARGE NOTE NURSING/CASE MANAGEMENT/SOCIAL WORK - PATIENT PORTAL LINK FT
You can access the FollowMyHealth Patient Portal offered by [facility] by registering at the following website: [email or web address]. By joining [employer]’s FollowMyHealth portal, you will also be able to view your health information using other applications (apps) compatible with our system.

## 2022-04-30 NOTE — PROGRESS NOTE ADULT - ASSESSMENT
# OMERO / CKD (follows w/ Dr Willard Urrutia)   # Pyuria / R lank pain  # Hyponatremia  # Hyperkalemia   # Metabolic acidosis  # HTN  # Normocytic anemia   - cr was trending up / check repeat   -  torsemide  on hold    - limit fluid intake to 1 liter daily  - renal diet / ensure pt is on low K  -  continue lokelma   -   sodium bicarb 650 mg q8h, can d/c   - last  phos level at goal, no need for phos binder  - rheumatology notes appreciated   -will follow

## 2022-04-30 NOTE — DISCHARGE NOTE NURSING/CASE MANAGEMENT/SOCIAL WORK - NSDCPEFALRISK_GEN_ALL_CORE
For information on Fall & Injury Prevention, visit: [email or web address] OR  [email or web address] OR  https://www.cdc.gov/steadi/patient.html

## 2022-04-30 NOTE — PROGRESS NOTE ADULT - SUBJECTIVE AND OBJECTIVE BOX
Patient: Charley Odonnell Date: 2021   : 1929 Attending: Dilip Mahajan MD   89 year old female      Primary Rehab Diagnosis: Primary Rehabilitation Diagnosis: Debility  Expected Discharge Date: 2021  Expected Discharge Time:  Afternoon [30]    Subjective: Ms. Odonnell feeling better this am and had decreased bowel movements. Patient denies abdominal pain and nausea. Patient had good sleep.       Reviewed: Allergies and Medications      Vital Last Value 24 Hour Range   Temperature 98.6 °F (37 °C) (21 0500) Temp  Min: 98.6 °F (37 °C)  Max: 99.6 °F (37.6 °C)   Pulse 98 (21 0904) Pulse  Min: 86  Max: 104   Respiratory 17 (21 050) Resp  Min: 17  Max: 17   Non-Invasive  Blood Pressure 132/82 (21 0904) BP  Min: 118/58  Max: 140/80   Pulse Oximetry 96 % (21 0500) SpO2  Min: 96 %  Max: 100 %     Vital Today Admit   Weight 56.9 kg (125 lb 7.1 oz) (21 0515) Weight: 58.4 kg (128 lb 12 oz) (21)   Height N/A Height: 5' 2\" (157.5 cm) (21)   BMI N/A BMI (Calculated): 23.55 (21)     Weight over the past 48 Hours:  No data found.     Intake/Output:    Last Stool Occurrence: 1 (21 08)    I/O this shift:  In: 320 [P.O.:320]  Out: -     I/O last 3 completed shifts:  In: 780 [P.O.:780]  Out: -       Intake/Output Summary (Last 24 hours) at 2021 1043  Last data filed at 2021 0942  Gross per 24 hour   Intake 680 ml   Output --   Net 680 ml       Central Line: No    Tanner: No    Physical Exam:   GENERAL:  The patient is a elderly fragile right-handed  female sitting in the bed in no apparent distress.    HEENT:  Atraumatic, normocephalic, sclerae anicteric, conjunctivae not injected, oral mucous membranes are pink and moist. [place].   NECK:  Supple, trachea midline.  CHEST:  Clear to auscultation bilaterally, normal respiratory rate.  CARDIOVASCULAR:  Regular rate and rhythm, normal S1, S2.  ABDOMEN:  Soft, nontender,  nondistended, negative for hepatosplenomegaly and positive bowel sounds in all quadrants of the abdomen.   EXTREMITIES:  Without clubbing, cyanosis with trace edema.  MUSCULOSKELETAL:  No gross appendicular deformities.     NEUROLOGIC:  Patient is alert and oriented to person, place and has to be oriented to the date.  Patient was able to follow simple one-step and two-step command consistently.   Cranial nerves II-XII are intact.    Motor.Strength is 4+/5 in left upper and lower extremity, 4+/5 in right upper and lower extremity. Tone was assessed and is normal in upper and lower extremities.    Sensory.Sensation is intact to light touch in upper and lower extremities.  Coordination.Coordination tested with finger nose finger testing demonstrated no dysmetria.   Gait.Impaired with decreased strength and endurance.         Laboratory Results:    Lab Results   Component Value Date    SODIUM 144 12/11/2021    POTASSIUM 3.9 12/11/2021    CHLORIDE 108 (H) 12/11/2021    CO2 27 12/11/2021    CALCIUM 8.4 12/11/2021    BUN 27 (H) 12/11/2021    CREATININE 1.34 (H) 12/11/2021    MG 2.6 (H) 12/07/2021    INR 1.1 12/07/2021    PT 11.9 (H) 12/07/2021    WBC 20.3 (H) 12/07/2021    WBC 21.3 (H) 12/07/2021    HCT 49.0 (H) 12/07/2021    HCT 51.5 (H) 12/07/2021    HGB 16.6 (H) 12/07/2021    HGB 16.4 (H) 12/07/2021     12/07/2021     12/07/2021    ALBUMIN 3.2 (L) 12/07/2021    GLUCOSE 213 (H) 12/11/2021     Imaging:     Other:     Current Functional status over the last 24 hours:    Nursing Skin Documentation:   Integumentary Assessment: Exceptions to WDL (12/21/21 1600)   Bladder FIM Documentation:                         Bowel FIM Documentation:                        Pain Documentation:       Mobility Documentation:   ,    Sit to Stand: Supervision (Supv) (12/21/21 1401), Stand to Sit: Supervision (Supv) (12/21/21 1401),    Gait Assistance: Supervision (Supv) (12/21/21 1401), Assistive Device/: 2-wheeled  walker;Gait Belt (12/21/21 1401), Ambulation Distance (Feet): 50 Feet (x3) (12/21/21 1401)   ,     Selfcare Documentation:                     Communication/Cognition/Swallowing Documentation:   ,        ,     ,                Impressions/Plan/DC Plan:  Debility.Ms. Odonnell will be continued with acute inpatient rehab. The therapist documentation in Epic noted. The tentative discharge is set for 12-24. Continue rehab as tolerated. Improved therapy participation.      Gait impairment.  Patient will be followed by the therapist to improve the gait pattern and ambulatory distance.     Cognitive impairment.  Patient will be followed by the speech therapist for ongoing cognitive rehabilitation, provision of the compensatory techniques and completion of the caregiver instruction to provide periodic supervision after discharge     Hard of hearing.  patient was able to communicate wants and needs and not interfering with therapy progress.    Diarrhea. Due to C.diff colitis. Patient will be continued with oral Vancomycin. Improved symptoms.      Non ST segment myocardial infarction.  Patient denies ongoing anginal symptoms and will be continue present management under the guidance of internist.     Paroxysmal atrial fibrillation.  Patient continued on rate controlled with continuation of the metoprolol XL 50 mg p.o. b.i.d..  The risk outweighs the benefit of ongoing anticoagulation at this time.     Acute kidney injury worsening the chronic kidney disease.  Patient with improved BUN creatinine and further management as per the internist.     Essential hypertension.  Patient with adequate blood pressure control and will be continued on Toprol-XL 50 mg p.o. b.i.d..     Diabetes mellitus.  Patient continued on insulin lispro on a sliding scale basis and further management as per the internist. Patient was on oral hypoglycemics before hospitalization.      Anemia of chronic disease.  Patient with stable hemoglobin hematocrit  and be closely monitored.     Dyslipidemia.  Patient continued on atorvastatin 40 mg p.o. daily.  Patient denies any ongoing myalgias.    Patient is participating actively with the Rehabilitation Team and is making progress.   Rehab team's documentation reviewed with current status noted above. See team conference reports for specific discharge plans.     I have considered how current medical status and co-morbidities are impacting progress towards goals. Presently, the patient's medical co-morbidities aremaximized and are not inhibiting therapy progress with the medical plan as noted. The patient has continued functional deficits requiring inpatient rehabilitation. Continue intensive rehab, medical supervision , nursing cares and comprehensive discharge planning.     Dilip Mahajan MD   Diplomate American Board of PM & R.   Diplomate American Board of Brain Injury Medicine.       seen and examined  24 h events noted        PAST HISTORY  --------------------------------------------------------------------------------  No significant changes to PMH, PSH, FHx, SHx, unless otherwise noted    ALLERGIES & MEDICATIONS  --------------------------------------------------------------------------------  Allergies    Keflex (Swelling)    Intolerances      Standing Inpatient Medications  apixaban 2.5 milliGRAM(s) Oral two times a day  atorvastatin 20 milliGRAM(s) Oral at bedtime  chlorhexidine 4% Liquid 1 Application(s) Topical <User Schedule>  ciprofloxacin     Tablet 250 milliGRAM(s) Oral daily  hydrALAZINE 25 milliGRAM(s) Oral three times a day  isosorbide   mononitrate ER Tablet (IMDUR) 30 milliGRAM(s) Oral daily  latanoprost 0.005% Ophthalmic Solution 1 Drop(s) Both EYES at bedtime  methocarbamol 500 milliGRAM(s) Oral every 8 hours  metoprolol succinate ER 25 milliGRAM(s) Oral daily  polyethylene glycol 3350 17 Gram(s) Oral daily  sertraline 50 milliGRAM(s) Oral daily  sodium bicarbonate 650 milliGRAM(s) Oral three times a day  sodium zirconium cyclosilicate 10 Gram(s) Oral daily  timolol 0.5% Solution 1 Drop(s) Both EYES two times a day    PRN Inpatient Medications  acetaminophen     Tablet .. 650 milliGRAM(s) Oral every 6 hours PRN  aluminum hydroxide/magnesium hydroxide/simethicone Suspension 30 milliLiter(s) Oral every 4 hours PRN  bisacodyl Suppository 10 milliGRAM(s) Rectal daily PRN  melatonin 3 milliGRAM(s) Oral at bedtime PRN  ondansetron Injectable 4 milliGRAM(s) IV Push every 8 hours PRN        VITALS/PHYSICAL EXAM  --------------------------------------------------------------------------------  T(C): 37 (04-30-22 @ 05:27), Max: 37 (04-30-22 @ 05:27)  HR: 70 (04-30-22 @ 05:27) (64 - 70)  BP: 127/60 (04-30-22 @ 05:27) (127/60 - 160/72)  RR: 18 (04-30-22 @ 05:27) (16 - 20)  SpO2: 100% (04-29-22 @ 19:00) (100% - 100%)  Wt(kg): --        04-29-22 @ 07:01  -  04-30-22 @ 07:00  --------------------------------------------------------  IN: 0 mL / OUT: 400 mL / NET: -400 mL    04-30-22 @ 07:01  -  04-30-22 @ 09:29  --------------------------------------------------------  IN: 388 mL / OUT: 150 mL / NET: 238 mL      Physical Exam:  	Gen: NAD,   	Pulm: CTA B/L  	CV:  S1S2; no rub  	Abd: +distended  	LE: no edema    LABS/STUDIES  --------------------------------------------------------------------------------              9.0    6.16  >-----------<  181      [04-29-22 @ 07:17]              27.6     132  |  96  |  53  ----------------------------<  121      [04-29-22 @ 07:17]  4.8   |  26  |  3.4        Ca     8.5     [04-29-22 @ 07:17]      Mg     1.8     [04-29-22 @ 07:17]    TPro  5.8  /  Alb  3.1  /  TBili  <0.2  /  DBili  x   /  AST  33  /  ALT  12  /  AlkPhos  64  [04-29-22 @ 07:17]      Serum Osmolality 288      [04-28-22 @ 16:00]    Creatinine Trend:  SCr 3.4 [04-29 @ 07:17]  SCr 3.0 [04-28 @ 11:50]  SCr 3.1 [04-27 @ 16:00]  SCr 3.4 [04-27 @ 04:30]  SCr 3.8 [04-26 @ 13:02]    Urinalysis - [04-27-22 @ 11:30]      Color Light Yellow / Appearance Slightly Turbid / SG 1.007 / pH 6.0      Gluc Negative / Ketone Negative  / Bili Negative / Urobili <2 mg/dL       Blood Trace / Protein 30 mg/dL / Leuk Est Large / Nitrite Positive      RBC 0 /  / Hyaline 0 / Gran  / Sq Epi  / Non Sq Epi 1 / Bacteria Few    Urine Creatinine 27      [04-27-22 @ 11:30]  Urine Protein 52      [04-26-22 @ 00:49]  Urine Sodium 92.0      [04-26-22 @ 00:49]  Urine Urea Nitrogen 189      [04-27-22 @ 11:30]  Urine Potassium 20      [04-27-22 @ 11:30]  Urine Osmolality 264      [04-27-22 @ 11:30]    Ferritin 257      [04-29-22 @ 07:17]  PTH -- (Ca 8.4)      [03-07-22 @ 05:41]   32  TSH 1.10      [04-28-22 @ 16:00]  Lipid: chol 110, TG 60, HDL 35, LDL --      [03-07-22 @ 05:41]      Rheumatoid Factor 17      [04-27-22 @ 16:00]

## 2022-04-30 NOTE — PROGRESS NOTE ADULT - SUBJECTIVE AND OBJECTIVE BOX
SUBJECTIVE:    Patient is a 86y old Female who presents with a chief complaint of right lower leg pain/social admit (30 Apr 2022 09:29)    Currently admitted to medicine with the primary diagnosis of Hyperkalemia     Today is hospital day 5d. This morning she is resting in bed and reports no new issues or overnight events.     PAST MEDICAL & SURGICAL HISTORY  Chronic kidney disease    Pacemaker    Hypertension    Gout    Diverticulitis  sigmoid    Diastolic heart failure    Rheumatoid arthritis    DVT, lower extremity    Artificial pacemaker    History of appendectomy    History of tonsillectomy    History of hysterectomy      SOCIAL HISTORY:  Negative for smoking/alcohol/drug use.     ALLERGIES:  Keflex (Swelling)    MEDICATIONS:  STANDING MEDICATIONS  apixaban 2.5 milliGRAM(s) Oral two times a day  atorvastatin 20 milliGRAM(s) Oral at bedtime  chlorhexidine 4% Liquid 1 Application(s) Topical <User Schedule>  ciprofloxacin     Tablet 250 milliGRAM(s) Oral daily  hydrALAZINE 25 milliGRAM(s) Oral three times a day  isosorbide   mononitrate ER Tablet (IMDUR) 30 milliGRAM(s) Oral daily  latanoprost 0.005% Ophthalmic Solution 1 Drop(s) Both EYES at bedtime  methocarbamol 500 milliGRAM(s) Oral every 8 hours  metoprolol succinate ER 25 milliGRAM(s) Oral daily  polyethylene glycol 3350 17 Gram(s) Oral daily  senna 2 Tablet(s) Oral at bedtime  sertraline 50 milliGRAM(s) Oral daily  sodium bicarbonate 650 milliGRAM(s) Oral three times a day  sodium zirconium cyclosilicate 10 Gram(s) Oral daily  timolol 0.5% Solution 1 Drop(s) Both EYES two times a day    PRN MEDICATIONS  acetaminophen     Tablet .. 650 milliGRAM(s) Oral every 6 hours PRN  aluminum hydroxide/magnesium hydroxide/simethicone Suspension 30 milliLiter(s) Oral every 4 hours PRN  bisacodyl Suppository 10 milliGRAM(s) Rectal daily PRN  melatonin 3 milliGRAM(s) Oral at bedtime PRN  ondansetron Injectable 4 milliGRAM(s) IV Push every 8 hours PRN    VITALS:   T(F): 98.7  HR: 70  BP: 123/60  RR: 18  SpO2: 100%    LABS:                        9.2    6.66  )-----------( 203      ( 30 Apr 2022 11:00 )             28.5     04-30    131<L>  |  94<L>  |  51<H>  ----------------------------<  104<H>  4.8   |  25  |  3.0<H>    Ca    8.6      30 Apr 2022 11:00  Mg     1.8     04-30    TPro  6.2  /  Alb  3.4<L>  /  TBili  <0.2  /  DBili  x   /  AST  34  /  ALT  13  /  AlkPhos  74  04-30      Culture - Urine (collected 27 Apr 2022 21:26)  Source: Clean Catch Clean Catch (Midstream)  Preliminary Report (30 Apr 2022 10:46):    >100,000 CFU/ml Escherichia coli    <10,000 CFU/ml Normal Urogenital arturo present      PHYSICAL EXAM:  GENERAL: NAD, well-developed, Non-toxic, stated age   HEAD:  Atraumatic, Normocephalic  EYES: EOMI, Sclera White   NECK: Supple, No JVD  CHEST/LUNG: clear b/l breath sounds; No wheezing, rhonchi, or crackles  HEART: Regular rate and rhythm; s1, s2, No murmurs, rubs, or gallops  ABDOMEN: Soft, Nontender, Nondistended; Bowel sounds present, No rebound or guarding noted   EXTREMITIES:  No lower extremity edema or calf tenderness to palpation.  No clubbing or cyanosis  PSYCH: AAOx3, pleasant, cooperative, not anxious  NEUROLOGY: CN intact, 5/5 strength in all extremities, Sensation grossly intact.   SKIN: No rashes or lesions

## 2022-04-30 NOTE — PROGRESS NOTE ADULT - ASSESSMENT
86 year old female ([employer]) with PMX HFrEF 25%, s/p AICD, pulm HTN, provoked PE/DVT on Eliquis, DLD, glaucoma and CKD 4 presents to ED for "right shoulder pain, radiating to elbow, radiating to hip and radiating to toes" for one week which was worsening since last night.    Hyperkalemia  Hyponatremia - improved  OMERO on CKD-4  Chronic HFrEF S/P AICD  Pulmonary HTN  Chronic DVT on Eliquis  RA  UTI  constipation              PLAN:    ·	Cont telemetry - has NSVT  ·	Nephrology f/u appreciated   ·	Cr trended up today  ·	restart lasix   ·	BMP in AM  ·	lokelma to once a day - hyperkalemia improving  ·	Low K diet  ·	renal US: no hydronephrosis  ·	Water restriction to 1L per day  ·	No ACE-I or ARB  ·	Rheumatology eval noted. Unlikely RA. rule out crystal induced inflammation  ·	ESR 80, CRP<3, uric acid 7.6  ·	f/u CCP ab  ·	X-Ray R shoulder and hip reviewed. Osteoarthritis, osteopenia, degenerative changes  ·	Pain control  ·	consider short course of steroids if c/o pain  ·	Cont Eliquis   ·	PT eval noted. Recommended home with home services.   ·	For UTI, on PO Cipro 250 mg po daily and Check urine culture  ·	dulcolax suppository, Miralax and monitor BM's and for resolution of abdominal pain    PROGRESS NOTE HANDOFF    Anticipated for discharge

## 2022-04-30 NOTE — PROGRESS NOTE ADULT - PROVIDER SPECIALTY LIST ADULT
Add 68785 Cpt? (Important Note: In 2017 The Use Of 06389 Is Being Tracked By Cms To Determine Future Global Period Reimbursement For Global Periods): no Internal Medicine

## 2022-05-01 VITALS — HEART RATE: 67 BPM | SYSTOLIC BLOOD PRESSURE: 113 MMHG | DIASTOLIC BLOOD PRESSURE: 55 MMHG

## 2022-05-01 LAB
-  AMIKACIN: SIGNIFICANT CHANGE UP
-  AMOXICILLIN/CLAVULANIC ACID: SIGNIFICANT CHANGE UP
-  AMPICILLIN/SULBACTAM: SIGNIFICANT CHANGE UP
-  AMPICILLIN: SIGNIFICANT CHANGE UP
-  AZTREONAM: SIGNIFICANT CHANGE UP
-  CEFAZOLIN: SIGNIFICANT CHANGE UP
-  CEFEPIME: SIGNIFICANT CHANGE UP
-  CEFOXITIN: SIGNIFICANT CHANGE UP
-  CEFTRIAXONE: SIGNIFICANT CHANGE UP
-  CIPROFLOXACIN: SIGNIFICANT CHANGE UP
-  ERTAPENEM: SIGNIFICANT CHANGE UP
-  GENTAMICIN: SIGNIFICANT CHANGE UP
-  IMIPENEM: SIGNIFICANT CHANGE UP
-  LEVOFLOXACIN: SIGNIFICANT CHANGE UP
-  MEROPENEM: SIGNIFICANT CHANGE UP
-  NITROFURANTOIN: SIGNIFICANT CHANGE UP
-  PIPERACILLIN/TAZOBACTAM: SIGNIFICANT CHANGE UP
-  TIGECYCLINE: SIGNIFICANT CHANGE UP
-  TOBRAMYCIN: SIGNIFICANT CHANGE UP
-  TRIMETHOPRIM/SULFAMETHOXAZOLE: SIGNIFICANT CHANGE UP
ALBUMIN SERPL ELPH-MCNC: 3.5 G/DL — SIGNIFICANT CHANGE UP (ref 3.5–5.2)
ALP SERPL-CCNC: 77 U/L — SIGNIFICANT CHANGE UP (ref 30–115)
ALT FLD-CCNC: 13 U/L — SIGNIFICANT CHANGE UP (ref 0–41)
ANION GAP SERPL CALC-SCNC: 15 MMOL/L — HIGH (ref 7–14)
AST SERPL-CCNC: 32 U/L — SIGNIFICANT CHANGE UP (ref 0–41)
BILIRUB SERPL-MCNC: <0.2 MG/DL — SIGNIFICANT CHANGE UP (ref 0.2–1.2)
BUN SERPL-MCNC: 51 MG/DL — HIGH (ref 10–20)
CALCIUM SERPL-MCNC: 8.5 MG/DL — SIGNIFICANT CHANGE UP (ref 8.5–10.1)
CHLORIDE SERPL-SCNC: 96 MMOL/L — LOW (ref 98–110)
CO2 SERPL-SCNC: 24 MMOL/L — SIGNIFICANT CHANGE UP (ref 17–32)
CREAT SERPL-MCNC: 3.1 MG/DL — HIGH (ref 0.7–1.5)
CULTURE RESULTS: SIGNIFICANT CHANGE UP
EGFR: 14 ML/MIN/1.73M2 — LOW
GLUCOSE SERPL-MCNC: 93 MG/DL — SIGNIFICANT CHANGE UP (ref 70–99)
HCT VFR BLD CALC: 29.1 % — LOW (ref 37–47)
HGB BLD-MCNC: 9.4 G/DL — LOW (ref 12–16)
MCHC RBC-ENTMCNC: 29.5 PG — SIGNIFICANT CHANGE UP (ref 27–31)
MCHC RBC-ENTMCNC: 32.3 G/DL — SIGNIFICANT CHANGE UP (ref 32–37)
MCV RBC AUTO: 91.2 FL — SIGNIFICANT CHANGE UP (ref 81–99)
METHOD TYPE: SIGNIFICANT CHANGE UP
NRBC # BLD: 0 /100 WBCS — SIGNIFICANT CHANGE UP (ref 0–0)
ORGANISM # SPEC MICROSCOPIC CNT: SIGNIFICANT CHANGE UP
ORGANISM # SPEC MICROSCOPIC CNT: SIGNIFICANT CHANGE UP
PLATELET # BLD AUTO: 164 K/UL — SIGNIFICANT CHANGE UP (ref 130–400)
POTASSIUM SERPL-MCNC: 4.7 MMOL/L — SIGNIFICANT CHANGE UP (ref 3.5–5)
POTASSIUM SERPL-SCNC: 4.7 MMOL/L — SIGNIFICANT CHANGE UP (ref 3.5–5)
PROT SERPL-MCNC: 6.4 G/DL — SIGNIFICANT CHANGE UP (ref 6–8)
RBC # BLD: 3.19 M/UL — LOW (ref 4.2–5.4)
RBC # FLD: 13.7 % — SIGNIFICANT CHANGE UP (ref 11.5–14.5)
SODIUM SERPL-SCNC: 135 MMOL/L — SIGNIFICANT CHANGE UP (ref 135–146)
SPECIMEN SOURCE: SIGNIFICANT CHANGE UP
WBC # BLD: 5.56 K/UL — SIGNIFICANT CHANGE UP (ref 4.8–10.8)
WBC # FLD AUTO: 5.56 K/UL — SIGNIFICANT CHANGE UP (ref 4.8–10.8)

## 2022-05-01 PROCEDURE — 99239 HOSP IP/OBS DSCHRG MGMT >30: CPT

## 2022-05-01 RX ADMIN — Medication 250 MILLIGRAM(S): at 11:08

## 2022-05-01 RX ADMIN — APIXABAN 2.5 MILLIGRAM(S): 2.5 TABLET, FILM COATED ORAL at 05:47

## 2022-05-01 RX ADMIN — SODIUM ZIRCONIUM CYCLOSILICATE 10 GRAM(S): 10 POWDER, FOR SUSPENSION ORAL at 11:06

## 2022-05-01 RX ADMIN — Medication 25 MILLIGRAM(S): at 05:48

## 2022-05-01 RX ADMIN — METHOCARBAMOL 500 MILLIGRAM(S): 500 TABLET, FILM COATED ORAL at 05:47

## 2022-05-01 RX ADMIN — Medication 650 MILLIGRAM(S): at 05:48

## 2022-05-01 RX ADMIN — SERTRALINE 50 MILLIGRAM(S): 25 TABLET, FILM COATED ORAL at 11:08

## 2022-05-01 RX ADMIN — ISOSORBIDE MONONITRATE 30 MILLIGRAM(S): 60 TABLET, EXTENDED RELEASE ORAL at 11:08

## 2022-05-01 RX ADMIN — POLYETHYLENE GLYCOL 3350 17 GRAM(S): 17 POWDER, FOR SOLUTION ORAL at 11:07

## 2022-05-01 RX ADMIN — CHLORHEXIDINE GLUCONATE 1 APPLICATION(S): 213 SOLUTION TOPICAL at 05:49

## 2022-05-01 RX ADMIN — Medication 1 DROP(S): at 05:49

## 2022-05-01 RX ADMIN — Medication 25 MILLIGRAM(S): at 05:47

## 2022-05-01 NOTE — PROGRESS NOTE ADULT - SUBJECTIVE AND OBJECTIVE BOX
Nephrology progress note    Patient was seen and examined, events over the last 24 h noted   Cr stable    Allergies:  Keflex (Swelling)    Hospital Medications:   MEDICATIONS  (STANDING):  apixaban 2.5 milliGRAM(s) Oral two times a day  atorvastatin 20 milliGRAM(s) Oral at bedtime  chlorhexidine 4% Liquid 1 Application(s) Topical <User Schedule>  ciprofloxacin     Tablet 250 milliGRAM(s) Oral daily  hydrALAZINE 25 milliGRAM(s) Oral three times a day  isosorbide   mononitrate ER Tablet (IMDUR) 30 milliGRAM(s) Oral daily  latanoprost 0.005% Ophthalmic Solution 1 Drop(s) Both EYES at bedtime  methocarbamol 500 milliGRAM(s) Oral every 8 hours  metoprolol succinate ER 25 milliGRAM(s) Oral daily  polyethylene glycol 3350 17 Gram(s) Oral daily  senna 2 Tablet(s) Oral at bedtime  sertraline 50 milliGRAM(s) Oral daily  sodium bicarbonate 650 milliGRAM(s) Oral three times a day  sodium zirconium cyclosilicate 10 Gram(s) Oral daily  timolol 0.5% Solution 1 Drop(s) Both EYES two times a day        VITALS:  T(F): 97.8 (22 @ 05:05), Max: 98.7 (22 @ 12:53)  HR: 67 (22 @ 10:58)  BP: 113/55 (22 @ 10:58)  RR: 18 (22 @ 05:05)  SpO2: --  Wt(kg): --     @ :  -   @ 07:00  --------------------------------------------------------  IN: 0 mL / OUT: 400 mL / NET: -400 mL     @ 07:01  -   @ 07:00  --------------------------------------------------------  IN: 989 mL / OUT: 1025 mL / NET: -36 mL          PHYSICAL EXAM:  Constitutional: NAD  HEENT: anicteric sclera, oropharynx clear, MMM  Neck: No JVD  Respiratory: CTAB, no wheezes, rales or rhonchi  Cardiovascular: S1, S2, RRR  Gastrointestinal: BS+, soft, NT/ND  Extremities: No cyanosis or clubbing. No peripheral edema  :  No carlos.   Skin: No rashes    LABS:      135  |  96<L>  |  51<H>  ----------------------------<  93  4.7   |  24  |  3.1<H>    Ca    8.5      01 May 2022 07:10  Mg     1.8         TPro  6.4  /  Alb  3.5  /  TBili  <0.2  /  DBili      /  AST  32  /  ALT  13  /  AlkPhos  77                            9.4    5.56  )-----------( 164      ( 01 May 2022 07:10 )             29.1       Urine Studies:  Urinalysis Basic - ( 2022 11:30 )    Color: Light Yellow / Appearance: Slightly Turbid / S.007 / pH:   Gluc:  / Ketone: Negative  / Bili: Negative / Urobili: <2 mg/dL   Blood:  / Protein: 30 mg/dL / Nitrite: Positive   Leuk Esterase: Large / RBC: 0 /HPF /  /HPF   Sq Epi:  / Non Sq Epi: 1 /HPF / Bacteria: Few      Osmolality, Random Urine: 264 mos/kg ( @ 11:30)  Potassium, Random Urine: 20 mmol/L ( @ 11:30)  Creatinine, Random Urine: 27 mg/dL ( @ 11:30)  Creatinine, Random Urine: 56 mg/dL ( @ 00:49)  Protein/Creatinine Ratio Calculation: 0.9 Ratio ( @ 00:49)  Sodium, Random Urine: 92.0 mmoL/L ( @ 00:49)  Osmolality, Random Urine: 322 mos/kg ( @ 00:49)    RADIOLOGY & ADDITIONAL STUDIES:

## 2022-05-01 NOTE — PROGRESS NOTE ADULT - ASSESSMENT
# OMERO / CKD (follows w/ Dr Willard Urrutia)   # Pyuria / R lank pain  # Hyponatremia  # Hyperkalemia   # Metabolic acidosis  # HTN  # Normocytic anemia   - cr stable  -  torsemide  on hold    - limit fluid intake to 1 liter daily  - renal diet / ensure pt is on low K  -  dec lokelma  to QOD   -   stop sodium bicarb 650 mg q8h,   - last  phos level at goal, no need for phos binder  - rheumatology notes appreciated   -will follow

## 2022-05-01 NOTE — PROGRESS NOTE ADULT - SUBJECTIVE AND OBJECTIVE BOX
RACHEL SUMMERS 86y Female  MRN#: [id number]   Hospital Day: 6d    SUBJECTIVE  Patient is a 86y old Female who presents with a chief complaint of right lower leg pain/social admit (30 Apr 2022 15:51)  Currently admitted to medicine with the primary diagnosis of Hyperkalemia      INTERVAL HPI AND OVERNIGHT EVENTS:  Patient was examined and seen at bedside. This morning she is resting comfortably in bed. No issues or overnight events.    OBJECTIVE  PAST MEDICAL & SURGICAL HISTORY  Chronic kidney disease    Pacemaker    Hypertension    Gout    Diverticulitis  sigmoid    Diastolic heart failure    Rheumatoid arthritis    DVT, lower extremity    Artificial pacemaker    History of appendectomy    History of tonsillectomy    History of hysterectomy      ALLERGIES:  Keflex (Swelling)    MEDICATIONS:  STANDING MEDICATIONS  apixaban 2.5 milliGRAM(s) Oral two times a day  atorvastatin 20 milliGRAM(s) Oral at bedtime  chlorhexidine 4% Liquid 1 Application(s) Topical <User Schedule>  ciprofloxacin     Tablet 250 milliGRAM(s) Oral daily  hydrALAZINE 25 milliGRAM(s) Oral three times a day  isosorbide   mononitrate ER Tablet (IMDUR) 30 milliGRAM(s) Oral daily  latanoprost 0.005% Ophthalmic Solution 1 Drop(s) Both EYES at bedtime  methocarbamol 500 milliGRAM(s) Oral every 8 hours  metoprolol succinate ER 25 milliGRAM(s) Oral daily  polyethylene glycol 3350 17 Gram(s) Oral daily  senna 2 Tablet(s) Oral at bedtime  sertraline 50 milliGRAM(s) Oral daily  sodium bicarbonate 650 milliGRAM(s) Oral three times a day  sodium zirconium cyclosilicate 10 Gram(s) Oral daily  timolol 0.5% Solution 1 Drop(s) Both EYES two times a day    PRN MEDICATIONS  acetaminophen     Tablet .. 650 milliGRAM(s) Oral every 6 hours PRN  aluminum hydroxide/magnesium hydroxide/simethicone Suspension 30 milliLiter(s) Oral every 4 hours PRN  bisacodyl Suppository 10 milliGRAM(s) Rectal daily PRN  melatonin 3 milliGRAM(s) Oral at bedtime PRN  ondansetron Injectable 4 milliGRAM(s) IV Push every 8 hours PRN      VITAL SIGNS: Last 24 Hours  T(C): 36.8 (30 Apr 2022 20:13), Max: 37.1 (30 Apr 2022 12:53)  T(F): 98.3 (30 Apr 2022 20:13), Max: 98.7 (30 Apr 2022 12:53)  HR: 67 (30 Apr 2022 20:13) (67 - 70)  BP: 127/47 (30 Apr 2022 20:13) (123/60 - 127/60)  BP(mean): --  RR: 18 (30 Apr 2022 20:13) (18 - 18)  SpO2: --    LABS:                        9.2    6.66  )-----------( 203      ( 30 Apr 2022 11:00 )             28.5     04-30    131<L>  |  94<L>  |  51<H>  ----------------------------<  104<H>  4.8   |  25  |  3.0<H>    Ca    8.6      30 Apr 2022 11:00  Mg     1.8     04-30    TPro  6.2  /  Alb  3.4<L>  /  TBili  <0.2  /  DBili  x   /  AST  34  /  ALT  13  /  AlkPhos  74  04-30                  RADIOLOGY:      PHYSICAL EXAM:  CONSTITUTIONAL: No acute distress, AAOx3  HEAD: Atraumatic, normocephalic  EYES: EOM intact, PERRLA, conjunctiva and sclera clear  ENT: Supple, no masses, no thyromegaly, no bruits, no JVD; moist mucous membranes  PULMONARY: Clear to auscultation bilaterally; no wheezes, rales, or rhonchi  CARDIOVASCULAR: Regular rate and rhythm; no murmurs, rubs, or gallops  GASTROINTESTINAL: Soft, non-tender, non-distended; bowel sounds present  MUSCULOSKELETAL: 2+ peripheral pulses; no clubbing, no cyanosis, no edema. R hip tender to palpation   NEUROLOGY: non-focal, moves all extremitites   SKIN: No rashes or lesions; warm and dry. Noted swelling/nodules in b/l hands

## 2022-05-01 NOTE — PROGRESS NOTE ADULT - REASON FOR ADMISSION
right lower leg pain/social admit

## 2022-05-01 NOTE — PROGRESS NOTE ADULT - ASSESSMENT
86 year old female (jehovahs witness) with PMX HFrEF 25% s/p AICD, pulm HTN, provoked PE/DVT on eliquis, DLD, glaucoma  CKD  presents to ED for right lower leg pain for one week.    # OMERO on CKD 4  # hyponatremia - improving   - 3.8 on admission, previous baseline creatinine around 2.6-2.8  - OMERO on CKD vs CKD 4 progression  - neprho consult -> Dr. Urrutia   - sodium bicarb q8 650mg   - trending down now 3.0  - f/u nephro if to resume torsemide     #hyperkalemia   - was 7.2 in ED with EKG changes, s/p Stat IV insulin and Calcium gluconate -> 5.4. improving   - low K diet  - lokelma daily    #right hip pain, right leg pain, Right shoulder pain  - c/w with pain control, muscle relaxants   - physiatry/PT consult - home PT  - hip xr shoulder xr consistent without osteoarthitis/osteopenia   - rheum consult:  Pt's current symptoms are not suspicious for rheumatoid arthritis. Unclear what is causing current flare of pain even with significant osteoarthritis   - f/u uric acid, inflammatory markers, and cyclic citrullinated peptide  - patient states her pain is improved today     #cystitis   - start cipro, renally dose. 3 day course  - f/u UCx    # ADHF / HFrEF, EF 25-30%  - TTE 3/2022: depressed LVEF 20-25%; moderate mitral and tricuspid regurgitation; enlarged LA  - c/w home dose of hydralazine, nitrates and toprol, lasix 20 mg po;  no ACEi/ARB/Entresto  - c/w Atorvastatin 20mg daily  - on aspirin, eliquis; no Plavix  - NO ACE inhibitors/ ARB/ Entresto/ spironolactone since patient can become severely hyperkalemic as per cardiology     #HTN  - Home meds: toprol, hydralazine and lasix  -NO ACE inhibitors/ ARB/ Entresto/ spironolactone since patient can become severely hyperkalemic     #Hx of PE/DVT  - Hx of provoked PE in the past and soleal vein thrombosis years ago?  - c/w eliquis 2.5 mg bid for now    #HDL  - c/w atorvastatin 20    #Glaucoma  - c/w eye drops    #hx Dysphagia / Anemia  - s/p EGD and colonoscopy in 2020 with non-specific gastritis and Diverticulosis  - Anemia can be due to CKD vs deficiency  - f/u iron panel  -NO BLOOD TRANSFUSIONS AS PER PT WISHES, JEHOVA'S WITNESS    DVT: Eliquis  GI: pantoprazole  Diet: DASH/TLC/Renal diet  Activity: Increase as tolerated  Dispo: Acute

## 2022-05-02 LAB
CCP IGG SERPL-ACNC: <8 UNITS — SIGNIFICANT CHANGE UP
RF+CCP IGG SER-IMP: NEGATIVE — SIGNIFICANT CHANGE UP

## 2022-05-03 LAB
ANA PAT FLD IF-IMP: SIGNIFICANT CHANGE UP
ANA TITR SER: ABNORMAL

## 2022-05-11 LAB
CORTICOSTEROID BINDING GLOBULIN RESULT: 1.5 MG/DL — LOW
CORTIS F/TOTAL MFR SERPL: 7 % — SIGNIFICANT CHANGE UP
CORTIS SERPL-MCNC: 5.9 UG/DL — SIGNIFICANT CHANGE UP
CORTISOL, FREE RESULT: 0.41 UG/DL — SIGNIFICANT CHANGE UP

## 2022-05-17 ENCOUNTER — APPOINTMENT (OUTPATIENT)
Dept: CARDIOLOGY | Facility: CLINIC | Age: 87
End: 2022-05-17

## 2022-05-22 ENCOUNTER — EMERGENCY (EMERGENCY)
Facility: HOSPITAL | Age: 87
LOS: 0 days | Discharge: HOME | End: 2022-05-22
Attending: EMERGENCY MEDICINE | Admitting: EMERGENCY MEDICINE
Payer: MEDICARE

## 2022-05-22 VITALS
TEMPERATURE: 99 F | SYSTOLIC BLOOD PRESSURE: 146 MMHG | RESPIRATION RATE: 18 BRPM | HEART RATE: 96 BPM | OXYGEN SATURATION: 98 % | DIASTOLIC BLOOD PRESSURE: 73 MMHG | WEIGHT: 119.05 LBS | HEIGHT: 63 IN

## 2022-05-22 VITALS
RESPIRATION RATE: 18 BRPM | SYSTOLIC BLOOD PRESSURE: 122 MMHG | HEART RATE: 80 BPM | OXYGEN SATURATION: 100 % | TEMPERATURE: 97 F | DIASTOLIC BLOOD PRESSURE: 68 MMHG

## 2022-05-22 DIAGNOSIS — Z90.89 ACQUIRED ABSENCE OF OTHER ORGANS: Chronic | ICD-10-CM

## 2022-05-22 DIAGNOSIS — I13.0 HYPERTENSIVE HEART AND CHRONIC KIDNEY DISEASE WITH HEART FAILURE AND STAGE 1 THROUGH STAGE 4 CHRONIC KIDNEY DISEASE, OR UNSPECIFIED CHRONIC KIDNEY DISEASE: ICD-10-CM

## 2022-05-22 DIAGNOSIS — Z95.0 PRESENCE OF CARDIAC PACEMAKER: ICD-10-CM

## 2022-05-22 DIAGNOSIS — M25.512 PAIN IN LEFT SHOULDER: ICD-10-CM

## 2022-05-22 DIAGNOSIS — M25.552 PAIN IN LEFT HIP: ICD-10-CM

## 2022-05-22 DIAGNOSIS — M06.9 RHEUMATOID ARTHRITIS, UNSPECIFIED: ICD-10-CM

## 2022-05-22 DIAGNOSIS — Z86.718 PERSONAL HISTORY OF OTHER VENOUS THROMBOSIS AND EMBOLISM: ICD-10-CM

## 2022-05-22 DIAGNOSIS — Z88.1 ALLERGY STATUS TO OTHER ANTIBIOTIC AGENTS STATUS: ICD-10-CM

## 2022-05-22 DIAGNOSIS — R13.10 DYSPHAGIA, UNSPECIFIED: ICD-10-CM

## 2022-05-22 DIAGNOSIS — Z95.0 PRESENCE OF CARDIAC PACEMAKER: Chronic | ICD-10-CM

## 2022-05-22 DIAGNOSIS — Z90.49 ACQUIRED ABSENCE OF OTHER SPECIFIED PARTS OF DIGESTIVE TRACT: ICD-10-CM

## 2022-05-22 DIAGNOSIS — Z90.89 ACQUIRED ABSENCE OF OTHER ORGANS: ICD-10-CM

## 2022-05-22 DIAGNOSIS — Z86.711 PERSONAL HISTORY OF PULMONARY EMBOLISM: ICD-10-CM

## 2022-05-22 DIAGNOSIS — Z98.890 OTHER SPECIFIED POSTPROCEDURAL STATES: Chronic | ICD-10-CM

## 2022-05-22 DIAGNOSIS — N18.9 CHRONIC KIDNEY DISEASE, UNSPECIFIED: ICD-10-CM

## 2022-05-22 DIAGNOSIS — Z90.710 ACQUIRED ABSENCE OF BOTH CERVIX AND UTERUS: ICD-10-CM

## 2022-05-22 DIAGNOSIS — M10.9 GOUT, UNSPECIFIED: ICD-10-CM

## 2022-05-22 DIAGNOSIS — I50.30 UNSPECIFIED DIASTOLIC (CONGESTIVE) HEART FAILURE: ICD-10-CM

## 2022-05-22 DIAGNOSIS — Z90.710 ACQUIRED ABSENCE OF BOTH CERVIX AND UTERUS: Chronic | ICD-10-CM

## 2022-05-22 DIAGNOSIS — Z90.49 ACQUIRED ABSENCE OF OTHER SPECIFIED PARTS OF DIGESTIVE TRACT: Chronic | ICD-10-CM

## 2022-05-22 DIAGNOSIS — Z79.01 LONG TERM (CURRENT) USE OF ANTICOAGULANTS: ICD-10-CM

## 2022-05-22 DIAGNOSIS — Z87.19 PERSONAL HISTORY OF OTHER DISEASES OF THE DIGESTIVE SYSTEM: ICD-10-CM

## 2022-05-22 PROCEDURE — 71045 X-RAY EXAM CHEST 1 VIEW: CPT | Mod: 26

## 2022-05-22 PROCEDURE — 73502 X-RAY EXAM HIP UNI 2-3 VIEWS: CPT | Mod: 26,LT

## 2022-05-22 PROCEDURE — 99284 EMERGENCY DEPT VISIT MOD MDM: CPT | Mod: FS

## 2022-05-22 PROCEDURE — 73030 X-RAY EXAM OF SHOULDER: CPT | Mod: 26,LT

## 2022-05-22 RX ORDER — DEXAMETHASONE 0.5 MG/5ML
10 ELIXIR ORAL ONCE
Refills: 0 | Status: COMPLETED | OUTPATIENT
Start: 2022-05-22 | End: 2022-05-22

## 2022-05-22 RX ORDER — ACETAMINOPHEN 500 MG
975 TABLET ORAL ONCE
Refills: 0 | Status: COMPLETED | OUTPATIENT
Start: 2022-05-22 | End: 2022-05-22

## 2022-05-22 RX ADMIN — Medication 975 MILLIGRAM(S): at 14:52

## 2022-05-22 RX ADMIN — Medication 10 MILLIGRAM(S): at 16:24

## 2022-05-22 NOTE — ED PROVIDER NOTE - CLINICAL SUMMARY MEDICAL DECISION MAKING FREE TEXT BOX
85yo F history as above presenting with pain on swallowing/odynophagia, intermittent for months. No drooling. +tolerating secretions. Per pt, happens with solids and liquids. No vomiting/regurgitation. No diff breathing. Also c/o MSK pain, L shoulder/hip, no falls/trauma. Well appearing, NAD, non toxic. NCAT PERRLA EOMI neck supple non tender normal wob cta bl rrr abdomen s nt nd no rebound no guarding WWPx4 neuro non focal Airway intact, no drooling, no stridor, no pooling of secretions, no posterior oropharyngeal erythema/edema/lesions. Speaking in full sentences. +tolerating secretions, tolerating PO. no cervical SABIHA. Comfortable with discharge and follow-up outpatient, strict return precautions given. Endorses understanding of all of this and aware that they can return at any time for new or concerning symptoms. No further questions or concerns at this time

## 2022-05-22 NOTE — ED ADULT NURSE NOTE - NS ED NURSE DC PT EDUCATION RESOURCES
"Requested Prescriptions   Pending Prescriptions Disp Refills     sotalol (BETAPACE) 80 MG tablet [Pharmacy Med Name: SOTALOL 80 MG TABLET]  Last Written Prescription Date:  7/22/2019  Last Fill Quantity: 180 tablet,  # refills: 1   Last office visit: 1/3/2020 with prescribing provider:  MARYAM Beckett   Future Office Visit:     180 tablet 1     Sig: TAKE 1 TABLET BY MOUTH TWICE A DAY       Beta-Blockers Protocol Passed - 1/10/2020  2:01 AM        Passed - Blood pressure under 140/90 in past 12 months     BP Readings from Last 3 Encounters:   01/03/20 116/78   11/05/19 120/84   11/01/19 117/74                 Passed - Patient is age 6 or older        Passed - Recent (12 mo) or future (30 days) visit within the authorizing provider's specialty     Patient has had an office visit with the authorizing provider or a provider within the authorizing providers department within the previous 12 mos or has a future within next 30 days. See \"Patient Info\" tab in inbasket, or \"Choose Columns\" in Meds & Orders section of the refill encounter.              Passed - Medication is active on med list           " Yes

## 2022-05-22 NOTE — ED PROVIDER NOTE - PATIENT PORTAL LINK FT
You can access the FollowMyHealth Patient Portal offered by [facility] by registering at the following website: [email or web address]. By joining [employer]’s FollowMyHealth portal, you will also be able to view your health information using other applications (apps) compatible with our system.

## 2022-05-22 NOTE — ED PROVIDER NOTE - PROVIDER TOKENS
PROVIDER:[TOKEN:[[id number]],FOLLOWUP:[1-3 Days]],PROVIDER:[TOKEN:[[id number]],FOLLOWUP:[1-3 Days]],PROVIDER:[TOKEN:[[id number]],FOLLOWUP:[1-3 Days]]

## 2022-05-22 NOTE — ED PROVIDER NOTE - CARE PLAN
Principal Discharge DX:	Left shoulder pain  Secondary Diagnosis:	Hip pain  Secondary Diagnosis:	Dysphagia   1

## 2022-05-22 NOTE — ED PROVIDER NOTE - CARE PROVIDERS DIRECT ADDRESSES
[email or web address]

## 2022-05-22 NOTE — ED ADULT NURSE NOTE - NSICDXPASTSURGICALHX_GEN_ALL_CORE_FT
PAST SURGICAL HISTORY:  Artificial pacemaker     H/O hernia repair     History of appendectomy     History of hysterectomy     History of tonsillectomy

## 2022-05-22 NOTE — ED ADULT TRIAGE NOTE - CHIEF COMPLAINT QUOTE
pt sts dry cough with pain. sts when she eats the food goes in the wrong hole. also co pain to left knee and hip.

## 2022-05-22 NOTE — ED PROVIDER NOTE - CARE PROVIDER_API CALL
Gennaro Jimenez)  Otolaryngology  [address] [place] [address]  Phone: [phone]  Fax: [phone]  Follow Up Time: 1-3 Days    Flavio Irvin)  Gastroenterology; Internal Medicine  [address] [place] [address]  Phone: [phone]  Fax: [phone]  Follow Up Time: 1-3 Days    Moise Ji)  Orthopaedic Surgery  [address] [place] [address]  Phone: [phone]  Fax: [phone]  Follow Up Time: 1-3 Days

## 2022-05-22 NOTE — ED ADULT NURSE NOTE - INTERVENTIONS DEFINITIONS
[place] to call system/Call bell, personal items and telephone within reach/Instruct patient to call for assistance/Room bathroom lighting operational/Non-slip footwear when patient is off stretcher/Physically safe environment: no spills, clutter or unnecessary equipment/Stretcher in lowest position, wheels locked, appropriate side rails in place/Provide visual cue, wrist band, yellow gown, etc./Monitor gait and stability/Reinforce activity limits and safety measures with patient and family/Provide visual clues: red socks

## 2022-05-22 NOTE — ED PROVIDER NOTE - OBJECTIVE STATEMENT
87 yo F pmhx chf s/p aicd, pulm htn, RA. PE on Eliquis presenting to the ED for evaluation of pain with swallowing intermittently x months, worse with solid foods, feels like food gets stuck sometimes, at this time does not feel anything stuck in throat. Pt also reporting persistent L shoulder and L hip pain x weeks, atraumatic. Denies drooling, vomiting, chest pain, sob, fever, chills.

## 2022-05-22 NOTE — ED PROVIDER NOTE - PHYSICAL EXAMINATION
GENERAL: Well-nourished, Well-developed. NAD.  HEAD: No visible or palpable bumps or hematomas. No ecchymosis behind ears B/L.  Eyes: PERRLA, EOMI. No asymmetry. No nystagmus. No conjunctival injection. Non-icteric sclera.  ENMT: MMM. No pharyngeal erythema or exudates. Uvula midline. handling secretions, airway patent  Neck: Supple. No LAD. FROM  CVS: Normal S1,S2. No murmurs appreciated on auscultation   RESP: No use of accessory muscles. Chest rise symmetrical with good expansion. Lungs clear to auscultation B/L. No wheezing, rales, or rhonchi auscultated.  GI: Normal auscultation of bowel sounds in all 4 quadrants. Soft, Nontender, Nondistended. No guarding or rebound tenderness. No CVAT B/L.  MSK: Extremities w/o deformity or ttp. No visible signs of trauma such as ecchymosis, erythema, or swelling. FROM of upper and lower extremities B/L. No midline spinal TTP. FROM of back with flexion and extension.  Skin: Warm, Dry. No rashes or lesions. Good cap refill < 2 sec B/L.  EXT: Radial and pedal pulses present B/L. No calf tenderness or swelling B/L. No palpable cords. No pedal edema B/L.  Neuro: AA&O x 3. Sensation grossly intact. Strength 5/5 B/L.

## 2022-05-22 NOTE — ED PROVIDER NOTE - NSFOLLOWUPINSTRUCTIONS_ED_ALL_ED_FT
**Follow up with GI doctor, ENT, and orthopedic doctor within 1 week**    Dysphagia    Swallowing problems (dysphagia) occur when solids and liquids seem to stick in your throat on the way down to your stomach, or the food takes longer to get to the stomach. Other symptoms include regurgitating food, noises coming from the throat, chest discomfort with swallowing, and a feeling of fullness or the feeling of something being stuck in your throat when swallowing. When blockage in your throat is complete, it may be associated with drooling.    CAUSES  Problems with swallowing may occur because of problems with the muscles. The food cannot be propelled in the usual manner into your stomach. You may have ulcers, scar tissue, or inflammation in the tube down which food travels from your mouth to your stomach (esophagus), which blocks food from passing normally into the stomach. Causes of inflammation include:    Acid reflux from your stomach into your esophagus.  Infection.  Radiation treatment for cancer.  Medicines taken without enough fluids to wash them down into your stomach.    You may have nerve problems that prevent signals from being sent to the muscles of your esophagus to contract and move your food down to your stomach. Globus pharyngeus is a relatively common problem in which there is a sense of an obstruction or difficulty in swallowing, without any physical abnormalities of the swallowing passages being found. This problem usually improves over time with reassurance and testing to rule out other causes.    DIAGNOSIS  Dysphagia can be diagnosed and its cause can be determined by tests in which you swallow a white substance that helps illuminate the inside of your throat (contrast medium) while X-rays are taken. Sometimes a flexible telescope that is inserted down your throat (endoscopy) to look at your esophagus and stomach is used.    TREATMENT  If the dysphagia is caused by acid reflux or infection, medicines may be used.  If the dysphagia is caused by problems with your swallowing muscles, swallowing therapy may be used to help you strengthen your swallowing muscles.  If the dysphagia is caused by a blockage or mass, procedures to remove the blockage may be done.    HOME CARE INSTRUCTIONS  Try to eat soft food that is easier to swallow and check your weight on a daily basis to be sure that it is not decreasing.  Be sure to drink liquids when sitting upright (not lying down).    SEEK MEDICAL CARE IF:  You are losing weight because you are unable to swallow.  You are coughing when you drink liquids (aspiration).  You are coughing up partially digested food.    SEEK IMMEDIATE MEDICAL CARE IF:  You are unable to swallow your own saliva?.  You are having shortness of breath or a fever, or both.?  You have a hoarse voice along with difficulty swallowing.    MAKE SURE YOU:  Understand these instructions.  Will watch your condition.  Will get help right away if you are not doing well or get worse.    ADDITIONAL NOTES AND INSTRUCTIONS    Please follow up with your Primary MD in 24-48 hr.  Seek immediate medical care for any new/worsening signs or symptoms.    Hip Pain    Your hip is the joint between your upper legs and your lower pelvis. The bones, cartilage, tendons, and muscles of your hip joint perform a lot of work each day supporting your body weight and allowing you to move around.    Hip pain can range from a minor ache to severe pain in one or both of your hips. Pain may be felt on the inside of the hip joint near the groin, or the outside near the buttocks and upper thigh. You may have swelling or stiffness as well.     HOME CARE INSTRUCTIONS  Take medicines only as directed by your health care provider.  Apply ice to the injured area:  Put ice in a plastic bag.  Place a towel between your skin and the bag.  Leave the ice on for 15–20 minutes at a time, 3–4 times a day.  Keep your leg raised (elevated) when possible to lessen swelling.  Avoid activities that cause pain.  Follow specific exercises as directed by your health care provider.  Sleep with a pillow between your legs on your most comfortable side.  Record how often you have hip pain, the location of the pain, and what it feels like.     SEEK MEDICAL CARE IF:  You are unable to put weight on your leg.  Your hip is red or swollen or very tender to touch.  Your pain or swelling continues or worsens after 1 week.  You have increasing difficulty walking.  You have a fever.    SEEK IMMEDIATE MEDICAL CARE IF:  You have fallen.  You have a sudden increase in pain and swelling in your hip.    MAKE SURE YOU:  Understand these instructions.  Will watch your condition.  Will get help right away if you are not doing well or get worse.    ADDITIONAL NOTES AND INSTRUCTIONS    Please follow up with your Primary MD in 24-48 hr.  Seek immediate medical care for any new/worsening signs or symptoms.

## 2022-05-22 NOTE — ED ADULT NURSE NOTE - OBJECTIVE STATEMENT
The patient is a 86y Female complaining of dry cough with pain. Patient when she eats the food goes in the wrong place. Patient also complains of pain to left knee and hip.

## 2022-05-22 NOTE — ED PROVIDER NOTE - NS ED ROS FT
Constitutional: (-) fever (-) malaise (-) diaphoresis (-) chills (-) wt. loss (-) bodyaches (-) night sweats  Eyes: (-) visual changes (-) eye pain (-) eye discharge (-) photophobia (-) FB sensation  ENMT: (-) nasal or chest congestion (-) runny nose (-) sore throat (-) hoarseness  (-) hearing changes (-) ear pain (-) ear discharge or infections (-) neck pain (-) neck stiffness (+)dysphagia (-)drooling  Cardiac: (-) chest pain  (-) palpitations (-) syncope (-) edema  Respiratory: (-) cough (-) SOB (-) GARCIA  GI: (-) nausea (-) vomiting (-) diarrhea (-) abdominal pain  : (-) dysuria (-) increased frequency  (-) hematuria (-) incontinence  MS: (+)L shoulder/L hip pain  Neuro: (-) headache (-) dizziness (-) numbness/tingling to extremities B/L (-) weakness  Skin: (-) rash (-) laceration    Except as documented in the HPI, all other systems are negative.

## 2022-05-26 PROBLEM — I82.409 ACUTE EMBOLISM AND THROMBOSIS OF UNSPECIFIED DEEP VEINS OF UNSPECIFIED LOWER EXTREMITY: Chronic | Status: ACTIVE | Noted: 2020-02-14

## 2022-06-01 ENCOUNTER — LABORATORY RESULT (OUTPATIENT)
Age: 87
End: 2022-06-01

## 2022-06-09 ENCOUNTER — APPOINTMENT (OUTPATIENT)
Dept: VASCULAR SURGERY | Facility: CLINIC | Age: 87
End: 2022-06-09
Payer: MEDICARE

## 2022-06-09 ENCOUNTER — EMERGENCY (EMERGENCY)
Facility: HOSPITAL | Age: 87
LOS: 0 days | Discharge: HOME | End: 2022-06-09
Attending: EMERGENCY MEDICINE | Admitting: EMERGENCY MEDICINE
Payer: MEDICARE

## 2022-06-09 VITALS
OXYGEN SATURATION: 99 % | RESPIRATION RATE: 18 BRPM | TEMPERATURE: 97 F | DIASTOLIC BLOOD PRESSURE: 81 MMHG | HEART RATE: 72 BPM | SYSTOLIC BLOOD PRESSURE: 123 MMHG

## 2022-06-09 VITALS
HEART RATE: 70 BPM | HEIGHT: 63 IN | SYSTOLIC BLOOD PRESSURE: 143 MMHG | DIASTOLIC BLOOD PRESSURE: 91 MMHG | TEMPERATURE: 97 F | WEIGHT: 119.05 LBS | RESPIRATION RATE: 18 BRPM | OXYGEN SATURATION: 99 %

## 2022-06-09 VITALS
BODY MASS INDEX: 22.15 KG/M2 | DIASTOLIC BLOOD PRESSURE: 80 MMHG | WEIGHT: 125 LBS | HEIGHT: 63 IN | SYSTOLIC BLOOD PRESSURE: 135 MMHG

## 2022-06-09 DIAGNOSIS — N18.9 CHRONIC KIDNEY DISEASE, UNSPECIFIED: ICD-10-CM

## 2022-06-09 DIAGNOSIS — M06.9 RHEUMATOID ARTHRITIS, UNSPECIFIED: ICD-10-CM

## 2022-06-09 DIAGNOSIS — Z95.0 PRESENCE OF CARDIAC PACEMAKER: Chronic | ICD-10-CM

## 2022-06-09 DIAGNOSIS — Z79.899 OTHER LONG TERM (CURRENT) DRUG THERAPY: ICD-10-CM

## 2022-06-09 DIAGNOSIS — Z90.89 ACQUIRED ABSENCE OF OTHER ORGANS: Chronic | ICD-10-CM

## 2022-06-09 DIAGNOSIS — D63.1 ANEMIA IN CHRONIC KIDNEY DISEASE: ICD-10-CM

## 2022-06-09 DIAGNOSIS — I13.0 HYPERTENSIVE HEART AND CHRONIC KIDNEY DISEASE WITH HEART FAILURE AND STAGE 1 THROUGH STAGE 4 CHRONIC KIDNEY DISEASE, OR UNSPECIFIED CHRONIC KIDNEY DISEASE: ICD-10-CM

## 2022-06-09 DIAGNOSIS — R60.0 LOCALIZED EDEMA: ICD-10-CM
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
## 2022-06-09 DIAGNOSIS — T50.1X5A ADVERSE EFFECT OF LOOP [HIGH-CEILING] DIURETICS, INITIAL ENCOUNTER: ICD-10-CM

## 2022-06-09 DIAGNOSIS — Z88.1 ALLERGY STATUS TO OTHER ANTIBIOTIC AGENTS STATUS: ICD-10-CM

## 2022-06-09 DIAGNOSIS — I50.9 HEART FAILURE, UNSPECIFIED: ICD-10-CM

## 2022-06-09 DIAGNOSIS — Z86.711 PERSONAL HISTORY OF PULMONARY EMBOLISM: ICD-10-CM

## 2022-06-09 DIAGNOSIS — Z90.49 ACQUIRED ABSENCE OF OTHER SPECIFIED PARTS OF DIGESTIVE TRACT: Chronic | ICD-10-CM
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
## 2022-06-09 DIAGNOSIS — Z79.01 LONG TERM (CURRENT) USE OF ANTICOAGULANTS: ICD-10-CM

## 2022-06-09 DIAGNOSIS — Z90.710 ACQUIRED ABSENCE OF BOTH CERVIX AND UTERUS: Chronic | ICD-10-CM

## 2022-06-09 DIAGNOSIS — Z98.890 OTHER SPECIFIED POSTPROCEDURAL STATES: Chronic | ICD-10-CM

## 2022-06-09 DIAGNOSIS — I27.20 PULMONARY HYPERTENSION, UNSPECIFIED: ICD-10-CM

## 2022-06-09 DIAGNOSIS — Z95.810 PRESENCE OF AUTOMATIC (IMPLANTABLE) CARDIAC DEFIBRILLATOR: ICD-10-CM

## 2022-06-09 LAB
ALBUMIN SERPL ELPH-MCNC: 3.9 G/DL — SIGNIFICANT CHANGE UP (ref 3.5–5.2)
ALP SERPL-CCNC: 86 U/L — SIGNIFICANT CHANGE UP (ref 30–115)
ALT FLD-CCNC: 13 U/L — SIGNIFICANT CHANGE UP (ref 0–41)
ANION GAP SERPL CALC-SCNC: 16 MMOL/L — HIGH (ref 7–14)
AST SERPL-CCNC: 39 U/L — SIGNIFICANT CHANGE UP (ref 0–41)
BASE EXCESS BLDV CALC-SCNC: -0.8 MMOL/L — SIGNIFICANT CHANGE UP (ref -2–3)
BASOPHILS # BLD AUTO: 0.03 K/UL — SIGNIFICANT CHANGE UP (ref 0–0.2)
BASOPHILS NFR BLD AUTO: 0.4 % — SIGNIFICANT CHANGE UP (ref 0–1)
BILIRUB SERPL-MCNC: 0.3 MG/DL — SIGNIFICANT CHANGE UP (ref 0.2–1.2)
BUN SERPL-MCNC: 56 MG/DL — HIGH (ref 10–20)
CA-I SERPL-SCNC: 1.23 MMOL/L — SIGNIFICANT CHANGE UP (ref 1.15–1.33)
CALCIUM SERPL-MCNC: 9.6 MG/DL — SIGNIFICANT CHANGE UP (ref 8.5–10.1)
CHLORIDE SERPL-SCNC: 102 MMOL/L — SIGNIFICANT CHANGE UP (ref 98–110)
CO2 SERPL-SCNC: 18 MMOL/L — SIGNIFICANT CHANGE UP (ref 17–32)
CREAT SERPL-MCNC: 3.7 MG/DL — HIGH (ref 0.7–1.5)
EGFR: 11 ML/MIN/1.73M2 — LOW
EOSINOPHIL # BLD AUTO: 0.38 K/UL — SIGNIFICANT CHANGE UP (ref 0–0.7)
EOSINOPHIL NFR BLD AUTO: 5.5 % — SIGNIFICANT CHANGE UP (ref 0–8)
GAS PNL BLDV: 134 MMOL/L — LOW (ref 136–145)
GAS PNL BLDV: SIGNIFICANT CHANGE UP
GAS PNL BLDV: SIGNIFICANT CHANGE UP
GLUCOSE SERPL-MCNC: 90 MG/DL — SIGNIFICANT CHANGE UP (ref 70–99)
HCO3 BLDV-SCNC: 25 MMOL/L — SIGNIFICANT CHANGE UP (ref 22–29)
HCT VFR BLD CALC: 30 % — LOW (ref 37–47)
HCT VFR BLDA CALC: 28 % — LOW (ref 39–51)
HGB BLD CALC-MCNC: 9.3 G/DL — LOW (ref 12.6–17.4)
HGB BLD-MCNC: 9.4 G/DL — LOW (ref 12–16)
IMM GRANULOCYTES NFR BLD AUTO: 0.1 % — SIGNIFICANT CHANGE UP (ref 0.1–0.3)
LACTATE BLDV-MCNC: 0.7 MMOL/L — SIGNIFICANT CHANGE UP (ref 0.5–2)
LYMPHOCYTES # BLD AUTO: 2.68 K/UL — SIGNIFICANT CHANGE UP (ref 1.2–3.4)
LYMPHOCYTES # BLD AUTO: 38.7 % — SIGNIFICANT CHANGE UP (ref 20.5–51.1)
MCHC RBC-ENTMCNC: 28.6 PG — SIGNIFICANT CHANGE UP (ref 27–31)
MCHC RBC-ENTMCNC: 31.3 G/DL — LOW (ref 32–37)
MCV RBC AUTO: 91.2 FL — SIGNIFICANT CHANGE UP (ref 81–99)
MONOCYTES # BLD AUTO: 0.87 K/UL — HIGH (ref 0.1–0.6)
MONOCYTES NFR BLD AUTO: 12.6 % — HIGH (ref 1.7–9.3)
NEUTROPHILS # BLD AUTO: 2.95 K/UL — SIGNIFICANT CHANGE UP (ref 1.4–6.5)
NEUTROPHILS NFR BLD AUTO: 42.7 % — SIGNIFICANT CHANGE UP (ref 42.2–75.2)
NRBC # BLD: 0 /100 WBCS — SIGNIFICANT CHANGE UP (ref 0–0)
NT-PROBNP SERPL-SCNC: 9122 PG/ML — HIGH (ref 0–300)
PCO2 BLDV: 48 MMHG — HIGH (ref 39–42)
PH BLDV: 7.33 — SIGNIFICANT CHANGE UP (ref 7.32–7.43)
PLATELET # BLD AUTO: 238 K/UL — SIGNIFICANT CHANGE UP (ref 130–400)
PO2 BLDV: 19 MMHG — SIGNIFICANT CHANGE UP
POTASSIUM BLDV-SCNC: 5 MMOL/L — SIGNIFICANT CHANGE UP (ref 3.5–5.1)
POTASSIUM SERPL-MCNC: 6 MMOL/L — CRITICAL HIGH (ref 3.5–5)
POTASSIUM SERPL-SCNC: 6 MMOL/L — CRITICAL HIGH (ref 3.5–5)
PROT SERPL-MCNC: 6.9 G/DL — SIGNIFICANT CHANGE UP (ref 6–8)
RBC # BLD: 3.29 M/UL — LOW (ref 4.2–5.4)
RBC # FLD: 15 % — HIGH (ref 11.5–14.5)
SAO2 % BLDV: 31.1 % — SIGNIFICANT CHANGE UP
SODIUM SERPL-SCNC: 136 MMOL/L — SIGNIFICANT CHANGE UP (ref 135–146)
TROPONIN T SERPL-MCNC: 0.03 NG/ML — CRITICAL HIGH
WBC # BLD: 6.92 K/UL — SIGNIFICANT CHANGE UP (ref 4.8–10.8)
WBC # FLD AUTO: 6.92 K/UL — SIGNIFICANT CHANGE UP (ref 4.8–10.8)

## 2022-06-09 PROCEDURE — 99214 OFFICE O/P EST MOD 30 MIN: CPT

## 2022-06-09 PROCEDURE — 93010 ELECTROCARDIOGRAM REPORT: CPT

## 2022-06-09 PROCEDURE — 71045 X-RAY EXAM CHEST 1 VIEW: CPT | Mod: 26

## 2022-06-09 PROCEDURE — 99285 EMERGENCY DEPT VISIT HI MDM: CPT | Mod: GC

## 2022-06-09 PROCEDURE — 93880 EXTRACRANIAL BILAT STUDY: CPT

## 2022-06-09 NOTE — HISTORY OF PRESENT ILLNESS
[FreeTextEntry1] : Ms. Marrufo is an 86 year-old female with carotid stenosis, presents for routine follow up. C/o difficulty swallowing at times.

## 2022-06-09 NOTE — ED ADULT NURSE NOTE - NSICDXPASTMEDICALHX_GEN_ALL_CORE_FT
PAST MEDICAL HISTORY:  Chronic kidney disease     Diastolic heart failure     Diverticulitis sigmoid    DVT, lower extremity Bilateral    Gout     Hypertension     Pacemaker     Rheumatoid arthritis

## 2022-06-09 NOTE — ED PROVIDER NOTE - ATTENDING CONTRIBUTION TO CARE
86-year-old history of hypertension, PE on Eliquis, CHF status post AICD, on Lasix alternating 20/40 every other day complains of increasing lower extremity edema over the last 3 days.  Patient reports that normally, she does not have edema.  She denies chest pain, shortness of breath, nausea, vomiting, abdominal pain.  On exam she is frail, but alert and nontoxic-appearing.  Neck supple, lungs clear, CV S1-S2, RRR, abdomen soft, nontender.  1+ bilateral edema without overlying skin changes.  Neuro grossly intact.  Labs, EKG, reassess.

## 2022-06-09 NOTE — ED PROVIDER NOTE - NSFOLLOWUPINSTRUCTIONS_ED_ALL_ED_FT
Increased Lasix to 40 mg per day for the next few days. Follow up closely with your doctor Dr. Celeste.     Return to the Emergency Room for any new or worsening symptoms.

## 2022-06-09 NOTE — ED PROVIDER NOTE - PHYSICAL EXAMINATION
Vital Signs: I have reviewed the initial vital signs.  Constitutional: well-nourished, appears stated age, no acute distress  Cardiovascular: regular rate, regular rhythm, well-perfused extremities  Respiratory: unlabored respiratory effort, clear to auscultation bilaterally  Gastrointestinal: soft, non-tender abdomen  Musculoskeletal: supple neck, (+) 1+ BL LE edema   Integumentary: warm, dry, no rash  Neurologic: awake, alert, extremities’ motor and sensory functions grossly intact  Psychiatric: appropriate mood, appropriate affect

## 2022-06-09 NOTE — ED ADULT TRIAGE NOTE - CHIEF COMPLAINT QUOTE
Presented to ED c/o b/l LE swelling since 3 days ago and shoulder pain from today. Pt. on diuretics.

## 2022-06-09 NOTE — ED PROVIDER NOTE - OBJECTIVE STATEMENT
The pt is an 86y F w/ hx of CHF s/p AICD on Lasix alternates between 40 mg and 20 mg once a day, pulmonary HTN, RA, PE on Eliquis presenting with BL LE edema x 3 days. Pt states at baseline she doesn't have any edema. Denies fever, headache, cough, chest pain, SOB, N/V, abdominal pain, diarrhea, dysuria.

## 2022-06-09 NOTE — ED PROVIDER NOTE - CLINICAL SUMMARY MEDICAL DECISION MAKING FREE TEXT BOX
Labs with anemia of 9.4 which is chronic.  Patient with CKD which is chronic, slightly bumped from most recent visit.  BNP is elevated.  Spoke with patient about the balance between cardiac and renal function, and advised that more diuretic might make her CKD worse.  Advised to take a few more days of 40 mg and then go back to her regular dosing schedule, and follow-up closely with PMD.  Patient is comfortable with this, and will return to the ED for persistent or worsening symptoms

## 2022-06-09 NOTE — ASSESSMENT
[FreeTextEntry1] : 87 y/o female with asymptomatic carotid stenosis, duplex today revealed patent ICA bilaterally.\par \par No surgical intervention is needed and I will see her back in one years time for repeat carotid duplex. Advised follow up with GI to address swallowing problems.\par \par I spent 40 minutes with patient performing physical exam, reviewing duplex results, discussing treatment plan and followup.\par

## 2022-06-09 NOTE — ED PROVIDER NOTE - NS ED ROS FT
Constitutional: (-) fever  Eyes/ENT: (-) blurry vision, (-) epistaxis  Cardiovascular: (-) chest pain, (-) syncope  Respiratory: (-) cough, (-) shortness of breath  Gastrointestinal: (-) vomiting, (-) diarrhea  Musculoskeletal: (-) neck pain, (-) back pain, (-) joint pain  Integumentary: (-) rash, (+) LE edema  Neurological: (-) headache, (-) altered mental status  Psychiatric: (-) hallucinations  Allergic/Immunologic: (-) pruritus

## 2022-06-09 NOTE — ED PROVIDER NOTE - PROGRESS NOTE DETAILS
Discussed results with pt. Advised to continue Lasix 40 mg daily for a few days instead of alternating 40->20->40 etc. Will follow up with PMD.

## 2022-06-09 NOTE — ED PROVIDER NOTE - PATIENT PORTAL LINK FT
You can access the FollowMyHealth Patient Portal offered by [facility] by registering at the following website: [email or web address]. By joining [employer]’s FollowMyHealth portal, you will also be able to view your health information using other applications (apps) compatible with our system.

## 2022-06-09 NOTE — ED PROVIDER NOTE - CARE PROVIDER_API CALL
Wade Celeste)  Internal Medicine  [address] [place] [address]  Phone: [phone]  Fax: [phone]  Follow Up Time: 1-3 Days

## 2022-06-12 ENCOUNTER — EMERGENCY (EMERGENCY)
Facility: HOSPITAL | Age: 87
LOS: 0 days | Discharge: HOME | End: 2022-06-12
Attending: EMERGENCY MEDICINE | Admitting: EMERGENCY MEDICINE
Payer: MEDICARE

## 2022-06-12 VITALS
TEMPERATURE: 97 F | WEIGHT: 119.05 LBS | SYSTOLIC BLOOD PRESSURE: 152 MMHG | HEART RATE: 68 BPM | OXYGEN SATURATION: 100 % | HEIGHT: 63 IN | RESPIRATION RATE: 16 BRPM | DIASTOLIC BLOOD PRESSURE: 70 MMHG

## 2022-06-12 DIAGNOSIS — I27.20 PULMONARY HYPERTENSION, UNSPECIFIED: ICD-10-CM

## 2022-06-12 DIAGNOSIS — M25.512 PAIN IN LEFT SHOULDER: ICD-10-CM

## 2022-06-12 DIAGNOSIS — I45.9 CONDUCTION DISORDER, UNSPECIFIED: ICD-10-CM

## 2022-06-12 DIAGNOSIS — Z86.711 PERSONAL HISTORY OF PULMONARY EMBOLISM: ICD-10-CM

## 2022-06-12 DIAGNOSIS — Z95.0 PRESENCE OF CARDIAC PACEMAKER: Chronic | ICD-10-CM

## 2022-06-12 DIAGNOSIS — Z87.891 PERSONAL HISTORY OF NICOTINE DEPENDENCE: ICD-10-CM

## 2022-06-12 DIAGNOSIS — Z98.890 OTHER SPECIFIED POSTPROCEDURAL STATES: Chronic | ICD-10-CM

## 2022-06-12 DIAGNOSIS — I50.9 HEART FAILURE, UNSPECIFIED: ICD-10-CM

## 2022-06-12 DIAGNOSIS — Z95.810 PRESENCE OF AUTOMATIC (IMPLANTABLE) CARDIAC DEFIBRILLATOR: ICD-10-CM

## 2022-06-12 DIAGNOSIS — M25.511 PAIN IN RIGHT SHOULDER: ICD-10-CM

## 2022-06-12 DIAGNOSIS — Z88.1 ALLERGY STATUS TO OTHER ANTIBIOTIC AGENTS STATUS: ICD-10-CM

## 2022-06-12 DIAGNOSIS — Z90.89 ACQUIRED ABSENCE OF OTHER ORGANS: Chronic | ICD-10-CM

## 2022-06-12 DIAGNOSIS — M54.12 RADICULOPATHY, CERVICAL REGION: ICD-10-CM

## 2022-06-12 DIAGNOSIS — Z90.49 ACQUIRED ABSENCE OF OTHER SPECIFIED PARTS OF DIGESTIVE TRACT: Chronic | ICD-10-CM

## 2022-06-12 DIAGNOSIS — Z90.710 ACQUIRED ABSENCE OF BOTH CERVIX AND UTERUS: Chronic | ICD-10-CM

## 2022-06-12 DIAGNOSIS — Z79.01 LONG TERM (CURRENT) USE OF ANTICOAGULANTS: ICD-10-CM

## 2022-06-12 PROCEDURE — 93010 ELECTROCARDIOGRAM REPORT: CPT

## 2022-06-12 PROCEDURE — 73030 X-RAY EXAM OF SHOULDER: CPT | Mod: 26,50

## 2022-06-12 PROCEDURE — 99284 EMERGENCY DEPT VISIT MOD MDM: CPT | Mod: GC

## 2022-06-12 NOTE — ED ADULT NURSE NOTE - DOES PATIENT HAVE ADVANCE DIRECTIVE
Called back to discuss methods for diagnosing ADHD vs other disorders (interview vs neuropsych testing). Pt can book her next apt with me for 60 minutes if she would like to complete ADHD diagnostic/rule-out interview. She stated understanding.     ----- Message from Alaina Kothari sent at 2/26/2019 12:40 PM CST -----  Contact: patient [phone]  Patient ask if you can give her a call because she forgot to Mention when she came to see you a couple of weeks ago that she is having problems with Focus and this usually occurs when she is at work. Patient said she is thinking about trying ADDERALL but would like to speak with you about that.     No

## 2022-06-12 NOTE — ED PROVIDER NOTE - CLINICAL SUMMARY MEDICAL DECISION MAKING FREE TEXT BOX
Patient here with bilateral shoulder pain no chest pain exam is reproducible.  Etiology of symptoms could be related to arthritis versus radiculopathy of the cervical spine.  Less likely ACS.

## 2022-06-12 NOTE — ED PROVIDER NOTE - NSFOLLOWUPINSTRUCTIONS_ED_ALL_ED_FT
Please follow-up with the orthopedic physician to discuss your recent symptoms and potential need for additional imaging.     Our Emergency Department Referral Coordinators will be reaching out ot you in the next 24-48 hours from 9:00am to 5:00pm (Monday to Friday) with a follow up appointment. Please expect a phone call from the hospital in that time frame. If you do not receive a call or if you have any questions or concerns, you can reach them at [phone] or [phone].      Cervical Radiculopathy    WHAT YOU NEED TO KNOW:    Cervical radiculopathy is a painful condition that happens when a spinal nerve in your neck is pinched or irritated.     DISCHARGE INSTRUCTIONS:    Medicines: You may need any of the following:  •NSAIDs help decrease swelling and pain. This medicine can be bought without a doctor's order. This medicine can cause stomach bleeding or kidney problems in certain people. If you take blood thinner medicine, always ask your healthcare provider if NSAIDs are safe for you. Always read the medicine label and follow the directions on it before using this medicine.  •Prescription pain medicine helps decrease pain. Do not wait until the pain is severe before you take this medicine.  •Steroids help decrease pain and swelling. These may be given as a pill or as an injection in your neck. You may need more than 1 injection if your symptoms do not improve after the first treatment.   •Take your medicine as directed. Contact your healthcare provider if you think your medicine is not helping or if you have side effects. Tell him of her if you are allergic to any medicine. Keep a list of the medicines, vitamins, and herbs you take. Include the amounts, and when and why you take them. Bring the list or the pill bottles to follow-up visits. Carry your medicine list with you in case of an emergency.    Follow up with your healthcare provider or spine specialist as directed: Write down your questions so you remember to ask them during your visits.     Physical therapy: Your healthcare provider may suggest physical therapy to stretch and strengthen your muscles. Your physical therapist can teach you how to improve your posture and the way you hold your neck. He may also teach you how to be safely active and avoid further injury. He can also help you develop an exercise program that is safe for your back and neck.     Self-care:   •Ice helps decrease swelling and pain. Ice may also help prevent tissue damage. Use an ice pack, or put crushed ice in a plastic bag. Cover it with a towel and place it on your neck for 15 to 20 minutes every hour or as directed.  •Rest when you feel it is needed. Slowly start to do more each day. Return to your daily activities as directed.   •Wear a soft collar. You may be given a soft collar to support your neck while you sleep. Wear the soft collar only as directed.  •Do light stretches and regular exercise. Your healthcare provider may suggest light stretches to help decrease stiffness in your neck and arm as you recover. After your pain is controlled, you may benefit from regular exercise. Ask what type of exercise is safe for your back and neck.   •Review your work area. A comfortable work area can help prevent neck strain. Ask your employer for an ergonomic review to check the position of your desk, chair, phone, and computer. Make any necessary adjustments for your comfort.     Contact your healthcare provider or spine specialist if:   •You have a fever.  •You are losing weight without trying.  •Your pain is worse, even with medicine.  •One or both hands feel more numb than before, or you cannot move your fingers well.  •You have questions or concerns about your condition or care.

## 2022-06-12 NOTE — ED PROVIDER NOTE - NSFOLLOWUPCLINICS_GEN_ALL_ED_FT
[facility]  Orthpedic  [address], [place]   Phone: [phone]  Fax:   Follow Up Time: 4-6 Days

## 2022-06-12 NOTE — ED ADULT TRIAGE NOTE - CHIEF COMPLAINT QUOTE
Left shoulder pain from shoulder to elbow. Pt also reports right shoulder pain radiating to elbow as well.

## 2022-06-12 NOTE — ED ADULT NURSE NOTE - OBJECTIVE STATEMENT
Pt reports to ed c/o L shoulder pain radiating down L arm from 12-1230am. Pt then reports it happening to R side @ 2am. Pt denies cp. Reports headache & pins/needles in both hands. PMH arthriti, CHF, HTN.

## 2022-06-12 NOTE — ED PROVIDER NOTE - OBJECTIVE STATEMENT
86F hx of 86F hx of chf s/p aicd, phtn, pe on eliquis, ra presents with bilatearl shoulder pain. patient was laying in bed at midnight when she developed shoulder pain on the left that radiated down to her arm, notes that it is worse when she tilts her head to the right and turns towards the right. Also has mild pain on the right that is less severe, worse when she tilts her head to the left and turns to the left, notes mild paresthesias in her hands during these episodes, denies chest pain/sob/vomiting/abd pain/back pain.

## 2022-06-12 NOTE — ED PROVIDER NOTE - PHYSICAL EXAMINATION
Vital Signs: I have reviewed the initial vital signs.  Constitutional: appears stated age, no acute distress.  HEENT: Airway patent, moist MM, no erythema/swelling/deformity of oral structures. EOMI, PERRLA.  CV: regular rate, regular rhythm, well-perfused extremities, 2+ b/l DP and radial pulses equal.  Lungs: BCTA, no increased WOB.  ABD: soft, NTND, no guarding or rebound, no pulsatile mass, no hernias, no flank pain.   MSK: Neck supple, nontender, nl ROM, no stepoff. Chest nontender. Back nontender in TLS spine or to b/l bony structures. mild ttp over the left trapezius, no obvious deformity. shoulder pain is reproducible upon rotation of head and lateral flexion of neck.   INTEG: Skin warm, dry, no rash.  NEURO: A&Ox3, moving all extremities, normal speech  PSYCH: Calm, cooperative, normal affect and interaction.

## 2022-06-12 NOTE — ED PROVIDER NOTE - PATIENT PORTAL LINK FT
You can access the FollowMyHealth Patient Portal offered by [facility] by registering at the following website: [email or web address]. By joining [email or web address]’s FollowMyHealth portal, you will also be able to view your health information using other applications (apps) compatible with our system.

## 2022-06-12 NOTE — ED PROVIDER NOTE - ATTENDING CONTRIBUTION TO CARE
86-year-old female history of CHF with AICD PE on Eliquis RA here for evaluation of bilateral shoulder pain.  Patient reports that she was lying in bed when she developed pain in her left shoulder rating down her arm which was with movement.  There on the evening patient started develop right shoulder pain which additionally radiates down her right arm..  In addition patient reports mild paresthesia in her hands during episodes of pain.  Patient denies any chest pain shortness of breath or diaphoresis.  He on exam patient no distress S1-S2 clear auscultation bilaterally no midline cervical tenderness.  Patient has reproducible pain to left shoulder and tenderness over the left trap.  In addition the pain is reproducible with flexion of the neck.  Sensation is intact bilateral upper extremities.  Patient has a stable gait using a walker.  Impression  Patient here with bilateral shoulder pain no chest pain exam is reproducible.  Etiology of symptoms could be related to arthritis versus radiculopathy of the cervical spine.  Less likely ACS.

## 2022-06-26 ENCOUNTER — EMERGENCY (EMERGENCY)
Facility: HOSPITAL | Age: 87
LOS: 0 days | Discharge: HOME | End: 2022-06-26
Attending: EMERGENCY MEDICINE | Admitting: EMERGENCY MEDICINE
Payer: MEDICARE

## 2022-06-26 VITALS
SYSTOLIC BLOOD PRESSURE: 133 MMHG | TEMPERATURE: 98 F | HEART RATE: 79 BPM | DIASTOLIC BLOOD PRESSURE: 62 MMHG | RESPIRATION RATE: 18 BRPM | OXYGEN SATURATION: 99 %

## 2022-06-26 VITALS
SYSTOLIC BLOOD PRESSURE: 137 MMHG | OXYGEN SATURATION: 96 % | WEIGHT: 119.93 LBS | RESPIRATION RATE: 18 BRPM | TEMPERATURE: 99 F | DIASTOLIC BLOOD PRESSURE: 63 MMHG | HEART RATE: 69 BPM | HEIGHT: 63 IN

## 2022-06-26 DIAGNOSIS — M06.9 RHEUMATOID ARTHRITIS, UNSPECIFIED: ICD-10-CM

## 2022-06-26 DIAGNOSIS — M79.89 OTHER SPECIFIED SOFT TISSUE DISORDERS: ICD-10-CM

## 2022-06-26 DIAGNOSIS — Z90.710 ACQUIRED ABSENCE OF BOTH CERVIX AND UTERUS: Chronic | ICD-10-CM

## 2022-06-26 DIAGNOSIS — Z95.0 PRESENCE OF CARDIAC PACEMAKER: ICD-10-CM

## 2022-06-26 DIAGNOSIS — Z95.0 PRESENCE OF CARDIAC PACEMAKER: Chronic | ICD-10-CM

## 2022-06-26 DIAGNOSIS — Z90.89 ACQUIRED ABSENCE OF OTHER ORGANS: Chronic | ICD-10-CM

## 2022-06-26 DIAGNOSIS — Z86.718 PERSONAL HISTORY OF OTHER VENOUS THROMBOSIS AND EMBOLISM: ICD-10-CM

## 2022-06-26 DIAGNOSIS — Z90.09 ACQUIRED ABSENCE OF OTHER PART OF HEAD AND NECK: ICD-10-CM

## 2022-06-26 DIAGNOSIS — Z87.19 PERSONAL HISTORY OF OTHER DISEASES OF THE DIGESTIVE SYSTEM: ICD-10-CM

## 2022-06-26 DIAGNOSIS — M25.511 PAIN IN RIGHT SHOULDER: ICD-10-CM

## 2022-06-26 DIAGNOSIS — N18.9 CHRONIC KIDNEY DISEASE, UNSPECIFIED: ICD-10-CM

## 2022-06-26 DIAGNOSIS — Z20.822 CONTACT WITH AND (SUSPECTED) EXPOSURE TO COVID-19: ICD-10-CM

## 2022-06-26 DIAGNOSIS — Z88.1 ALLERGY STATUS TO OTHER ANTIBIOTIC AGENTS STATUS: ICD-10-CM

## 2022-06-26 DIAGNOSIS — I50.9 HEART FAILURE, UNSPECIFIED: ICD-10-CM

## 2022-06-26 DIAGNOSIS — M25.512 PAIN IN LEFT SHOULDER: ICD-10-CM

## 2022-06-26 DIAGNOSIS — Z90.49 ACQUIRED ABSENCE OF OTHER SPECIFIED PARTS OF DIGESTIVE TRACT: Chronic | ICD-10-CM

## 2022-06-26 DIAGNOSIS — Z95.810 PRESENCE OF AUTOMATIC (IMPLANTABLE) CARDIAC DEFIBRILLATOR: ICD-10-CM

## 2022-06-26 DIAGNOSIS — Z79.01 LONG TERM (CURRENT) USE OF ANTICOAGULANTS: ICD-10-CM

## 2022-06-26 DIAGNOSIS — Z90.710 ACQUIRED ABSENCE OF BOTH CERVIX AND UTERUS: ICD-10-CM

## 2022-06-26 DIAGNOSIS — I27.20 PULMONARY HYPERTENSION, UNSPECIFIED: ICD-10-CM

## 2022-06-26 DIAGNOSIS — I13.0 HYPERTENSIVE HEART AND CHRONIC KIDNEY DISEASE WITH HEART FAILURE AND STAGE 1 THROUGH STAGE 4 CHRONIC KIDNEY DISEASE, OR UNSPECIFIED CHRONIC KIDNEY DISEASE: ICD-10-CM

## 2022-06-26 DIAGNOSIS — Z86.711 PERSONAL HISTORY OF PULMONARY EMBOLISM: ICD-10-CM

## 2022-06-26 DIAGNOSIS — Z98.890 OTHER SPECIFIED POSTPROCEDURAL STATES: Chronic | ICD-10-CM

## 2022-06-26 DIAGNOSIS — Z90.49 ACQUIRED ABSENCE OF OTHER SPECIFIED PARTS OF DIGESTIVE TRACT: ICD-10-CM

## 2022-06-26 DIAGNOSIS — I50.30 UNSPECIFIED DIASTOLIC (CONGESTIVE) HEART FAILURE: ICD-10-CM

## 2022-06-26 DIAGNOSIS — M10.9 GOUT, UNSPECIFIED: ICD-10-CM

## 2022-06-26 LAB
ALBUMIN SERPL ELPH-MCNC: 3.6 G/DL — SIGNIFICANT CHANGE UP (ref 3.5–5.2)
ALP SERPL-CCNC: 76 U/L — SIGNIFICANT CHANGE UP (ref 30–115)
ALT FLD-CCNC: 12 U/L — SIGNIFICANT CHANGE UP (ref 0–41)
ANION GAP SERPL CALC-SCNC: 9 MMOL/L — SIGNIFICANT CHANGE UP (ref 7–14)
AST SERPL-CCNC: 33 U/L — SIGNIFICANT CHANGE UP (ref 0–41)
BASOPHILS # BLD AUTO: 0.05 K/UL — SIGNIFICANT CHANGE UP (ref 0–0.2)
BASOPHILS NFR BLD AUTO: 0.9 % — SIGNIFICANT CHANGE UP (ref 0–1)
BILIRUB SERPL-MCNC: <0.2 MG/DL — SIGNIFICANT CHANGE UP (ref 0.2–1.2)
BUN SERPL-MCNC: 66 MG/DL — CRITICAL HIGH (ref 10–20)
CALCIUM SERPL-MCNC: 8.9 MG/DL — SIGNIFICANT CHANGE UP (ref 8.5–10.1)
CHLORIDE SERPL-SCNC: 104 MMOL/L — SIGNIFICANT CHANGE UP (ref 98–110)
CO2 SERPL-SCNC: 22 MMOL/L — SIGNIFICANT CHANGE UP (ref 17–32)
CREAT SERPL-MCNC: 3.4 MG/DL — HIGH (ref 0.7–1.5)
EGFR: 13 ML/MIN/1.73M2 — LOW
EOSINOPHIL # BLD AUTO: 0.5 K/UL — SIGNIFICANT CHANGE UP (ref 0–0.7)
EOSINOPHIL NFR BLD AUTO: 9.5 % — HIGH (ref 0–8)
GLUCOSE SERPL-MCNC: 114 MG/DL — HIGH (ref 70–99)
HCT VFR BLD CALC: 25.5 % — LOW (ref 37–47)
HGB BLD-MCNC: 7.9 G/DL — LOW (ref 12–16)
LYMPHOCYTES # BLD AUTO: 1.63 K/UL — SIGNIFICANT CHANGE UP (ref 1.2–3.4)
LYMPHOCYTES # BLD AUTO: 31 % — SIGNIFICANT CHANGE UP (ref 20.5–51.1)
MAGNESIUM SERPL-MCNC: 2 MG/DL — SIGNIFICANT CHANGE UP (ref 1.8–2.4)
MANUAL SMEAR VERIFICATION: SIGNIFICANT CHANGE UP
MCHC RBC-ENTMCNC: 28.6 PG — SIGNIFICANT CHANGE UP (ref 27–31)
MCHC RBC-ENTMCNC: 31 G/DL — LOW (ref 32–37)
MCV RBC AUTO: 92.4 FL — SIGNIFICANT CHANGE UP (ref 81–99)
MONOCYTES # BLD AUTO: 0.5 K/UL — SIGNIFICANT CHANGE UP (ref 0.1–0.6)
MONOCYTES NFR BLD AUTO: 9.5 % — HIGH (ref 1.7–9.3)
NEUTROPHILS # BLD AUTO: 2.58 K/UL — SIGNIFICANT CHANGE UP (ref 1.4–6.5)
NEUTROPHILS NFR BLD AUTO: 49.1 % — SIGNIFICANT CHANGE UP (ref 42.2–75.2)
NT-PROBNP SERPL-SCNC: 3100 PG/ML — HIGH (ref 0–300)
OVALOCYTES BLD QL SMEAR: SLIGHT — SIGNIFICANT CHANGE UP
PLAT MORPH BLD: NORMAL — SIGNIFICANT CHANGE UP
PLATELET # BLD AUTO: 188 K/UL — SIGNIFICANT CHANGE UP (ref 130–400)
POIKILOCYTOSIS BLD QL AUTO: SLIGHT — SIGNIFICANT CHANGE UP
POLYCHROMASIA BLD QL SMEAR: SLIGHT — SIGNIFICANT CHANGE UP
POTASSIUM SERPL-MCNC: 5.3 MMOL/L — HIGH (ref 3.5–5)
POTASSIUM SERPL-SCNC: 5.3 MMOL/L — HIGH (ref 3.5–5)
PROT SERPL-MCNC: 6.2 G/DL — SIGNIFICANT CHANGE UP (ref 6–8)
RBC # BLD: 2.76 M/UL — LOW (ref 4.2–5.4)
RBC # FLD: 15.5 % — HIGH (ref 11.5–14.5)
RBC BLD AUTO: ABNORMAL
SARS-COV-2 RNA SPEC QL NAA+PROBE: SIGNIFICANT CHANGE UP
SODIUM SERPL-SCNC: 135 MMOL/L — SIGNIFICANT CHANGE UP (ref 135–146)
TROPONIN T SERPL-MCNC: 0.03 NG/ML — CRITICAL HIGH
WBC # BLD: 5.26 K/UL — SIGNIFICANT CHANGE UP (ref 4.8–10.8)
WBC # FLD AUTO: 5.26 K/UL — SIGNIFICANT CHANGE UP (ref 4.8–10.8)

## 2022-06-26 PROCEDURE — 99285 EMERGENCY DEPT VISIT HI MDM: CPT | Mod: FS

## 2022-06-26 PROCEDURE — 93970 EXTREMITY STUDY: CPT | Mod: 26

## 2022-06-26 PROCEDURE — 93010 ELECTROCARDIOGRAM REPORT: CPT

## 2022-06-26 PROCEDURE — 71045 X-RAY EXAM CHEST 1 VIEW: CPT | Mod: 26

## 2022-06-26 RX ORDER — DEXAMETHASONE 0.5 MG/5ML
10 ELIXIR ORAL ONCE
Refills: 0 | Status: COMPLETED | OUTPATIENT
Start: 2022-06-26 | End: 2022-06-26

## 2022-06-26 RX ADMIN — Medication 10 MILLIGRAM(S): at 10:50

## 2022-06-26 NOTE — ED PROVIDER NOTE - PATIENT PORTAL LINK FT
You can access the FollowMyHealth Patient Portal offered by [facility] by registering at the following website: [email or web address]. By joining [employer]’s FollowMyHealth portal, you will also be able to view your health information using other applications (apps) compatible with our system.

## 2022-06-26 NOTE — ED PROVIDER NOTE - ATTENDING APP SHARED VISIT CONTRIBUTION OF CARE
86-year-old female with history of PE on Eliquis, AICD, CHF on furosemide, pulmonary hypertension, rheumatoid arthritis, presents primarily as she states she woke up this morning at 2 AM with left shoulder pain. She confirms that this is happened frequently in the past secondary to her rheumatoid arthritis but she was not sure if this might be heart related, so she came to ED. She also states that at that time this morning she looked at her legs and noticed that they were swollen more than usual. She states 3 weeks ago she was switched from 40 mg daily to 40/20 on alternating days. Denies all other symptoms including chest pain, shortness of breath, cough, fever, vomiting, diarrhea. On exam, afebrile, hemodynamically stable, saturating well, NAD, well appearing, sitting comfortably in bed, no WOB, speaking full sentences, head NCAT, EOMI grossly, anicteric, MMM, no JVD, RRR, nml S1/S2, no m/r/g, lungs CTAB, no w/r/r, abd soft, NT, ND, nml BS, no rebound or guarding, AAO, CN's 3-12 grossly intact, JUÁREZ spontaneously, 0.5+ symmetric pitting lower extremity edema, 2+ symmetric radial pulses, one skin warm, well perfused, no rashes or hives. Character low suspicion for ACS and troponin is at her baseline and ECG unchanged from prior. Character low suspicion for dissection and no murmur or pulse asymmetry. No evidence of septic arthritis. No trauma evidence of fracture/dislocation. Clinically no evidence of pulmonary fluid overload and BNP lower than usual. Edema does not appear significant, and patient has had a tenuous furosemide dosing course and especially in light of her GFR will defer possible doses change for outpatient follow-up. Noted anemia, patient with no bleeding or dizziness/SOB and there appears to be a dilutional component to that, and DAPHNE __________. 86-year-old female with history of PE on Eliquis, AICD, CHF on furosemide, pulmonary hypertension, rheumatoid arthritis, presents primarily as she states she woke up this morning at 2 AM with left shoulder pain. She confirms that this is happened frequently in the past secondary to her rheumatoid arthritis but she was not sure if this might be heart related, so she came to ED. She also states that at that time this morning she looked at her legs and noticed that they were swollen more than usual. She states 3 weeks ago she was switched from 40 mg daily to 40/20 on alternating days. Denies all other symptoms including chest pain, shortness of breath, cough, fever, vomiting, diarrhea. On exam, afebrile, hemodynamically stable, saturating well, NAD, well appearing, sitting comfortably in bed, no WOB, speaking full sentences, head NCAT, EOMI grossly, anicteric, MMM, no JVD, RRR, nml S1/S2, no m/r/g, lungs CTAB, no w/r/r, abd soft, NT, ND, nml BS, no rebound or guarding, AAO, CN's 3-12 grossly intact, JUÁREZ spontaneously, 0.5+ symmetric pitting lower extremity edema, 2+ symmetric radial pulses, one skin warm, well perfused, no rashes or hives. Character low suspicion for ACS and troponin is at her baseline and ECG unchanged from prior. Character low suspicion for dissection and no murmur or pulse asymmetry. No evidence of septic arthritis. No trauma evidence of fracture/dislocation. Clinically no evidence of pulmonary fluid overload and BNP lower than usual. Edema does not appear significant, and patient has had a tenuous furosemide dosing course and especially in light of her GFR will defer possible doses change for outpatient follow-up. Noted anemia, patient with no bleeding or dizziness/SOB and there appears to be a dilutional component to that, and DAPHNE negative (ALVARADO Isbell as chaperone. Patient is well appearing, NAD, afebrile, hemodynamically stable. Any available tests and studies were discussed with patient. Discharged with instructions in further symptomatic care, return precautions, and need for PMD f/u.

## 2022-06-26 NOTE — ED PROVIDER NOTE - CLINICAL SUMMARY MEDICAL DECISION MAKING FREE TEXT BOX
86-year-old female with history of PE on Eliquis, AICD, CHF on furosemide, pulmonary hypertension, rheumatoid arthritis, presents primarily as she states she woke up this morning at 2 AM with left shoulder pain. She confirms that this is happened frequently in the past secondary to her rheumatoid arthritis but she was not sure if this might be heart related, so she came to ED. She also states that at that time this morning she looked at her legs and noticed that they were swollen more than usual. She states 3 weeks ago she was switched from 40 mg daily to 40/20 on alternating days. Denies all other symptoms including chest pain, shortness of breath, cough, fever, vomiting, diarrhea. On exam, afebrile, hemodynamically stable, saturating well, NAD, well appearing, sitting comfortably in bed, no WOB, speaking full sentences, head NCAT, EOMI grossly, anicteric, MMM, no JVD, RRR, nml S1/S2, no m/r/g, lungs CTAB, no w/r/r, abd soft, NT, ND, nml BS, no rebound or guarding, AAO, CN's 3-12 grossly intact, JUÁREZ spontaneously, 0.5+ symmetric pitting lower extremity edema, 2+ symmetric radial pulses, one skin warm, well perfused, no rashes or hives. Character low suspicion for ACS and troponin is at her baseline and ECG unchanged from prior. Character low suspicion for dissection and no murmur or pulse asymmetry. No evidence of septic arthritis. No trauma evidence of fracture/dislocation. Clinically no evidence of pulmonary fluid overload and BNP lower than usual. Edema does not appear significant, and patient has had a tenuous furosemide dosing course and especially in light of her GFR will defer possible doses change for outpatient follow-up. Noted anemia, patient with no bleeding or dizziness/SOB and there appears to be a dilutional component to that, and DAPHNE negative (ALVARADO Isbell as chaperone. Patient is well appearing, NAD, afebrile, hemodynamically stable. Any available tests and studies were discussed with patient. Discharged with instructions in further symptomatic care, return precautions, and need for PMD f/u.

## 2022-06-26 NOTE — ED PROVIDER NOTE - PROGRESS NOTE DETAILS
vasc duplex prelim negative vasc duplex prelim negative.  patient reports improvement of pain after meds. results discussed with patient regarding signs and sx to be aware of that should prompt return to the er. patient agreeable and verbalizes understanding of plan. patient to fu with pmd this week, dr prince, for rpt labs and adjustment of lasix. patient comfortable with plan, will be dc home with aide.

## 2022-06-26 NOTE — ED PROVIDER NOTE - PHYSICAL EXAMINATION
CONSTITUTIONAL: elderly appearing female, non-toxic, no acute distress  SKIN: skin exam is warm and dry  EYES: PERRL, conjunctiva and sclera clear.  ENT: MMM, no nasal congestion  NECK: ROM intact   CARD: S1, S2 normal, no murmur  RESP: Good air movement bilaterally, no increased WOB.   ABD: soft; non-distended; non-tender.   EXT: Normal ROM. No midline tenderness. +L shoulder pain exacerbated w/ ROM, no overlying skin changes. pulses 2+   NEURO: awake, alert, following commands, oriented, grossly unremarkable. No Focal deficits. GCS 15.   PSYCH: Cooperative, appropriate. CONSTITUTIONAL: elderly appearing female, non-toxic, no acute distress  SKIN: skin exam is warm and dry  EYES: PERRL, conjunctiva and sclera clear.  ENT: MMM, no nasal congestion  NECK: ROM intact   CARD: S1, S2 normal, no murmur  RESP: Good air movement bilaterally, no increased WOB.   ABD: soft; non-distended; non-tender. DAPHNE: no brbpr, no external hemorrhoids.  EXT: Normal ROM. No midline tenderness. +L shoulder pain exacerbated w/ ROM, no overlying skin changes. pulses 2+   NEURO: awake, alert, following commands, oriented, grossly unremarkable. No Focal deficits. GCS 15.   PSYCH: Cooperative, appropriate.

## 2022-06-26 NOTE — ED ADULT NURSE NOTE - INTERVENTIONS DEFINITIONS
[place] to call system/Call bell, personal items and telephone within reach/Instruct patient to call for assistance/Non-slip footwear when patient is off stretcher/Stretcher in lowest position, wheels locked, appropriate side rails in place/Monitor gait and stability/Monitor for mental status changes and reorient to person, place, and time

## 2022-06-26 NOTE — ED PROVIDER NOTE - OBJECTIVE STATEMENT
86 year old female, past medical history chf s/p aicd, pulmonary htn, pe on eliquis, RA, who presents for eval. patient endorses b/l shoulder pain, L>R and LE swelling. patient with hx presentations to ED for similar. reports shoulder pain exacerbated with movement, mildly improved with tylenol. patient also reports having recent change to lasix from 40mg daily to 40 mg every other day, 20 mg every other day. denies falls/trauma. denies f/c, chest pain, shortness of breath, back pain, numbness/weakness, abd pain, nausea/vomiting/diarrhea. patient has been compliant with meds.

## 2022-06-26 NOTE — ED PROVIDER NOTE - NS ED ROS FT
Review of Systems:  	•	CONSTITUTIONAL: no fever, no chills   	•	SKIN: no rash  	•	EYES: no eye pain, no blurry vision  	•	ENT: no sore throat  	•	RESPIRATORY: no shortness of breath, no cough  	•	CARDIAC: no chest pain, no palpitations  	•	GI: no abd pain, no nausea, no vomiting   	•	GENITO-URINARY: no discharge, no dysuria; no hematuria, no increased urinary frequency  	•	MUSCULOSKELETAL: +L shoulder pain, +b/l le swelling    	•	NEUROLOGIC: no weakness, no headache, no paresthesias   	•	PSYCH: no anxiety, non suicidal, non homicidal, no hallucination, no depression

## 2022-06-26 NOTE — ED PROVIDER NOTE - CARE PROVIDER_API CALL
Moise Ji (MD)  Orthopaedic Surgery  [address] [place] [address]  Phone: [phone]  Fax: [phone]  Follow Up Time: 4-6 Days    MAHI SNOW  Internal Medicine  [address] [place] [address]  Phone: [phone]  Fax: ()-  Follow Up Time: 4-6 Days

## 2022-06-26 NOTE — ED PROVIDER NOTE - NSFOLLOWUPINSTRUCTIONS_ED_ALL_ED_FT
WHAT YOU NEED TO KNOW:    Leg edema is swelling caused by fluid buildup. Your legs may swell if you sit or stand for long periods of time, are pregnant, or are injured. Swelling may also occur if you have heart failure or circulation problems. This means that your heart does not pump blood through your body as it should.    DISCHARGE INSTRUCTIONS:    Self-care:     Elevate your legs: Raise your legs above the level of your heart as often as you can. This will help decrease swelling and pain. Prop your legs on pillows or blankets to keep them elevated comfortably.      Wear pressure stockings: These tight stockings put pressure on your legs to promote blood flow and prevent blood clots. Wear the stockings during the day. Do not wear them while you sleep.      Apply heat: Heat helps decrease pain and swelling. Apply heat on the area for 20 to 30 minutes every 2 hours for as many days as directed.       Stay active: Do not stand or sit for long periods of time. Ask your healthcare provider about the best exercise plan for you.      Eat healthy foods: Healthy foods include fruits, vegetables, whole-grain breads, low-fat dairy products, beans, lean meats, and fish. Ask if you need to be on a special diet. Limit salt. Salt will make your body hold even more fluid.    Follow up with your healthcare provider as directed: Write down your questions so you remember to ask them during your visits.     Contact your healthcare provider if:     You have a fever or feel more tired than usual.      The veins in your legs look larger than usual. They may look full or bulging.      Your legs itch or feel heavy.      You have red or white areas or sores on your legs. The skin may also appear dimpled or have indentations.      You are gaining weight.      You have trouble moving your ankles.      The swelling does not go away, or other parts of your body swell.      You have questions or concerns about your condition or care.    Return to the emergency department if:     You cannot walk.      You feel faint or confused.       Your skin turns blue or gray.      Your leg feels warm, tender, and painful. It may be swollen and red.      You have chest pain or trouble breathing that is worse when you lie down.      You suddenly feel lightheaded and have trouble breathing.      You have new and sudden chest pain. You may have more pain when you take deep breaths or cough. You may also cough up blood.      Shoulder Pain    Many things can cause shoulder pain, including:    An injury.  Moving the arm in the same way again and again (overuse).  Joint pain (arthritis).    Follow these instructions at home:  Take these actions to help with your pain:    Squeeze a soft ball or a foam pad as much as you can. This helps to prevent swelling. It also makes the arm stronger.  Take over-the-counter and prescription medicines only as told by your doctor.  If told, put ice on the area:    Put ice in a plastic bag.  Place a towel between your skin and the bag.  Leave the ice on for 20 minutes, 2–3 times per day. Stop putting on ice if it does not help with the pain.    If you were given a shoulder sling or immobilizer:    Wear it as told.  Remove it to shower or bathe.  Move your arm as little as possible.  Keep your hand moving. This helps prevent swelling.      Contact a doctor if:  Your pain gets worse.  Medicine does not help your pain.  You have new pain in your arm, hand, or fingers.  Get help right away if:  Your arm, hand, or fingers:    Tingle.  Are numb.  Are swollen.  Are painful.  Turn white or blue.    This information is not intended to replace advice given to you by your health care provider. Make sure you discuss any questions you have with your health care provider.

## 2022-06-26 NOTE — ED ADULT NURSE NOTE - NSICDXPASTMEDICALHX_GEN_ALL_CORE_FT
PAST MEDICAL HISTORY:  Chronic kidney disease     Diastolic heart failure     Diverticulitis sigmoid    DVT, lower extremity Bilateral    Gout     Hypertension     Pacemaker     Rheumatoid arthritis      non-smoker/Patient declined information

## 2022-06-26 NOTE — ED PROVIDER NOTE - EKG #1 DATE/TIME
26-Jun-2022 11:15 I personally performed the service described in the documentation recorded by the scribe in my presence, and it accurately and completely records my words and actions.

## 2022-06-28 ENCOUNTER — APPOINTMENT (OUTPATIENT)
Dept: CARDIOLOGY | Facility: CLINIC | Age: 87
End: 2022-06-28

## 2022-07-02 ENCOUNTER — EMERGENCY (EMERGENCY)
Facility: HOSPITAL | Age: 87
LOS: 0 days | Discharge: HOME | End: 2022-07-03
Attending: STUDENT IN AN ORGANIZED HEALTH CARE EDUCATION/TRAINING PROGRAM | Admitting: STUDENT IN AN ORGANIZED HEALTH CARE EDUCATION/TRAINING PROGRAM

## 2022-07-02 VITALS
HEART RATE: 64 BPM | HEIGHT: 63 IN | RESPIRATION RATE: 18 BRPM | SYSTOLIC BLOOD PRESSURE: 134 MMHG | WEIGHT: 121.03 LBS | DIASTOLIC BLOOD PRESSURE: 67 MMHG | OXYGEN SATURATION: 98 % | TEMPERATURE: 99 F

## 2022-07-02 DIAGNOSIS — Z86.718 PERSONAL HISTORY OF OTHER VENOUS THROMBOSIS AND EMBOLISM: ICD-10-CM

## 2022-07-02 DIAGNOSIS — N18.9 CHRONIC KIDNEY DISEASE, UNSPECIFIED: ICD-10-CM

## 2022-07-02 DIAGNOSIS — Z79.01 LONG TERM (CURRENT) USE OF ANTICOAGULANTS: ICD-10-CM

## 2022-07-02 DIAGNOSIS — Z90.49 ACQUIRED ABSENCE OF OTHER SPECIFIED PARTS OF DIGESTIVE TRACT: Chronic | ICD-10-CM

## 2022-07-02 DIAGNOSIS — Z95.0 PRESENCE OF CARDIAC PACEMAKER: Chronic | ICD-10-CM

## 2022-07-02 DIAGNOSIS — Z20.822 CONTACT WITH AND (SUSPECTED) EXPOSURE TO COVID-19: ICD-10-CM

## 2022-07-02 DIAGNOSIS — Z98.890 OTHER SPECIFIED POSTPROCEDURAL STATES: Chronic | ICD-10-CM

## 2022-07-02 DIAGNOSIS — Z88.1 ALLERGY STATUS TO OTHER ANTIBIOTIC AGENTS STATUS: ICD-10-CM

## 2022-07-02 DIAGNOSIS — I13.0 HYPERTENSIVE HEART AND CHRONIC KIDNEY DISEASE WITH HEART FAILURE AND STAGE 1 THROUGH STAGE 4 CHRONIC KIDNEY DISEASE, OR UNSPECIFIED CHRONIC KIDNEY DISEASE: ICD-10-CM

## 2022-07-02 DIAGNOSIS — M06.9 RHEUMATOID ARTHRITIS, UNSPECIFIED: ICD-10-CM

## 2022-07-02 DIAGNOSIS — Z90.89 ACQUIRED ABSENCE OF OTHER ORGANS: Chronic | ICD-10-CM

## 2022-07-02 DIAGNOSIS — I50.31 ACUTE DIASTOLIC (CONGESTIVE) HEART FAILURE: ICD-10-CM

## 2022-07-02 DIAGNOSIS — M79.89 OTHER SPECIFIED SOFT TISSUE DISORDERS: ICD-10-CM

## 2022-07-02 DIAGNOSIS — M10.9 GOUT, UNSPECIFIED: ICD-10-CM

## 2022-07-02 DIAGNOSIS — Z86.711 PERSONAL HISTORY OF PULMONARY EMBOLISM: ICD-10-CM

## 2022-07-02 DIAGNOSIS — Z90.710 ACQUIRED ABSENCE OF BOTH CERVIX AND UTERUS: Chronic | ICD-10-CM

## 2022-07-02 DIAGNOSIS — Z95.0 PRESENCE OF CARDIAC PACEMAKER: ICD-10-CM

## 2022-07-02 DIAGNOSIS — Z90.89 ACQUIRED ABSENCE OF OTHER ORGANS: ICD-10-CM

## 2022-07-02 LAB
ALBUMIN SERPL ELPH-MCNC: 4.1 G/DL — SIGNIFICANT CHANGE UP (ref 3.5–5.2)
ALP SERPL-CCNC: 97 U/L — SIGNIFICANT CHANGE UP (ref 30–115)
ALT FLD-CCNC: 17 U/L — SIGNIFICANT CHANGE UP (ref 0–41)
ANION GAP SERPL CALC-SCNC: 16 MMOL/L — HIGH (ref 7–14)
AST SERPL-CCNC: 44 U/L — HIGH (ref 0–41)
BASE EXCESS BLDV CALC-SCNC: -3.7 MMOL/L — LOW (ref -2–3)
BASE EXCESS BLDV CALC-SCNC: -4.2 MMOL/L — LOW (ref -2–3)
BASOPHILS # BLD AUTO: 0.03 K/UL — SIGNIFICANT CHANGE UP (ref 0–0.2)
BASOPHILS NFR BLD AUTO: 0.4 % — SIGNIFICANT CHANGE UP (ref 0–1)
BILIRUB SERPL-MCNC: <0.2 MG/DL — SIGNIFICANT CHANGE UP (ref 0.2–1.2)
BUN SERPL-MCNC: 74 MG/DL — CRITICAL HIGH (ref 10–20)
CA-I SERPL-SCNC: 1.06 MMOL/L — LOW (ref 1.15–1.33)
CA-I SERPL-SCNC: 1.15 MMOL/L — SIGNIFICANT CHANGE UP (ref 1.15–1.33)
CALCIUM SERPL-MCNC: 9 MG/DL — SIGNIFICANT CHANGE UP (ref 8.5–10.1)
CHLORIDE SERPL-SCNC: 102 MMOL/L — SIGNIFICANT CHANGE UP (ref 98–110)
CO2 SERPL-SCNC: 19 MMOL/L — SIGNIFICANT CHANGE UP (ref 17–32)
CREAT SERPL-MCNC: 3.2 MG/DL — HIGH (ref 0.7–1.5)
EGFR: 14 ML/MIN/1.73M2 — LOW
EOSINOPHIL # BLD AUTO: 0.44 K/UL — SIGNIFICANT CHANGE UP (ref 0–0.7)
EOSINOPHIL NFR BLD AUTO: 5.5 % — SIGNIFICANT CHANGE UP (ref 0–8)
GAS PNL BLDV: 132 MMOL/L — LOW (ref 136–145)
GAS PNL BLDV: 134 MMOL/L — LOW (ref 136–145)
GAS PNL BLDV: SIGNIFICANT CHANGE UP
GAS PNL BLDV: SIGNIFICANT CHANGE UP
GLUCOSE SERPL-MCNC: 84 MG/DL — SIGNIFICANT CHANGE UP (ref 70–99)
HCO3 BLDV-SCNC: 21 MMOL/L — LOW (ref 22–29)
HCO3 BLDV-SCNC: 22 MMOL/L — SIGNIFICANT CHANGE UP (ref 22–29)
HCT VFR BLD CALC: 32 % — LOW (ref 37–47)
HCT VFR BLDA CALC: 32 % — LOW (ref 39–51)
HCT VFR BLDA CALC: 35 % — LOW (ref 39–51)
HGB BLD CALC-MCNC: 10.6 G/DL — LOW (ref 12.6–17.4)
HGB BLD CALC-MCNC: 11.6 G/DL — LOW (ref 12.6–17.4)
HGB BLD-MCNC: 10.1 G/DL — LOW (ref 12–16)
IMM GRANULOCYTES NFR BLD AUTO: 0.8 % — HIGH (ref 0.1–0.3)
LACTATE BLDV-MCNC: 0.9 MMOL/L — SIGNIFICANT CHANGE UP (ref 0.5–2)
LACTATE BLDV-MCNC: 1.1 MMOL/L — SIGNIFICANT CHANGE UP (ref 0.5–2)
LYMPHOCYTES # BLD AUTO: 2.42 K/UL — SIGNIFICANT CHANGE UP (ref 1.2–3.4)
LYMPHOCYTES # BLD AUTO: 30.3 % — SIGNIFICANT CHANGE UP (ref 20.5–51.1)
MCHC RBC-ENTMCNC: 29.3 PG — SIGNIFICANT CHANGE UP (ref 27–31)
MCHC RBC-ENTMCNC: 31.6 G/DL — LOW (ref 32–37)
MCV RBC AUTO: 92.8 FL — SIGNIFICANT CHANGE UP (ref 81–99)
MONOCYTES # BLD AUTO: 1.06 K/UL — HIGH (ref 0.1–0.6)
MONOCYTES NFR BLD AUTO: 13.3 % — HIGH (ref 1.7–9.3)
NEUTROPHILS # BLD AUTO: 3.97 K/UL — SIGNIFICANT CHANGE UP (ref 1.4–6.5)
NEUTROPHILS NFR BLD AUTO: 49.7 % — SIGNIFICANT CHANGE UP (ref 42.2–75.2)
NRBC # BLD: 0 /100 WBCS — SIGNIFICANT CHANGE UP (ref 0–0)
NT-PROBNP SERPL-SCNC: 9501 PG/ML — HIGH (ref 0–300)
PCO2 BLDV: 34 MMHG — LOW (ref 39–42)
PCO2 BLDV: 45 MMHG — HIGH (ref 39–42)
PH BLDV: 7.3 — LOW (ref 7.32–7.43)
PH BLDV: 7.39 — SIGNIFICANT CHANGE UP (ref 7.32–7.43)
PLATELET # BLD AUTO: 208 K/UL — SIGNIFICANT CHANGE UP (ref 130–400)
PO2 BLDV: 38 MMHG — SIGNIFICANT CHANGE UP
PO2 BLDV: 89 MMHG — SIGNIFICANT CHANGE UP
POTASSIUM BLDV-SCNC: 5.4 MMOL/L — HIGH (ref 3.5–5.1)
POTASSIUM BLDV-SCNC: 7.1 MMOL/L — CRITICAL HIGH (ref 3.5–5.1)
POTASSIUM SERPL-MCNC: 6.1 MMOL/L — CRITICAL HIGH (ref 3.5–5)
POTASSIUM SERPL-SCNC: 6.1 MMOL/L — CRITICAL HIGH (ref 3.5–5)
PROT SERPL-MCNC: 7.2 G/DL — SIGNIFICANT CHANGE UP (ref 6–8)
RBC # BLD: 3.45 M/UL — LOW (ref 4.2–5.4)
RBC # FLD: 15.8 % — HIGH (ref 11.5–14.5)
SAO2 % BLDV: 59.7 % — SIGNIFICANT CHANGE UP
SAO2 % BLDV: 98.8 % — SIGNIFICANT CHANGE UP
SARS-COV-2 RNA SPEC QL NAA+PROBE: SIGNIFICANT CHANGE UP
SODIUM SERPL-SCNC: 137 MMOL/L — SIGNIFICANT CHANGE UP (ref 135–146)
TROPONIN T SERPL-MCNC: 0.02 NG/ML — HIGH
WBC # BLD: 7.98 K/UL — SIGNIFICANT CHANGE UP (ref 4.8–10.8)
WBC # FLD AUTO: 7.98 K/UL — SIGNIFICANT CHANGE UP (ref 4.8–10.8)

## 2022-07-02 PROCEDURE — 93970 EXTREMITY STUDY: CPT | Mod: 26

## 2022-07-02 PROCEDURE — 99220: CPT | Mod: FS

## 2022-07-02 PROCEDURE — 93010 ELECTROCARDIOGRAM REPORT: CPT

## 2022-07-02 PROCEDURE — 71045 X-RAY EXAM CHEST 1 VIEW: CPT | Mod: 26

## 2022-07-02 RX ORDER — FUROSEMIDE 40 MG
20 TABLET ORAL ONCE
Refills: 0 | Status: COMPLETED | OUTPATIENT
Start: 2022-07-02 | End: 2022-07-02

## 2022-07-02 RX ADMIN — Medication 20 MILLIGRAM(S): at 18:35

## 2022-07-02 NOTE — ED PROVIDER NOTE - CLINICAL SUMMARY MEDICAL DECISION MAKING FREE TEXT BOX
.    85 y/o pmh CHF s/p AICD, pulmonary htn, PE on Eliquis, RA p/w b/l leg swelling and mild GARCIA x past few days. Pt has had recent diuretic adjustments, including recent increase in dose to treat leg swelling. No zeb cp, fever, cough. + chronic R shoulder pain.    PE: GEN: NAD; HEAD: NCAT; ENT: MMM; NECK: supple; CARDIO: RRR; PULM: No resp distress, CTAB; ABD: s/nt; MSK: + b/l pedal edema; NEURO: grossly non focal; PSYCHE: cooperative.    Labs and EKG reviewed. Pt felt better in ED. Trop 0.03, similar to prior, ekg no stemi.    Pt placed in OBS for dyspnea in setting of likely fluid overload and dx uncertainty, for cont diuretics, serial labs, and close observation.    .

## 2022-07-02 NOTE — ED ADULT TRIAGE NOTE - ESI TRIAGE ACUITY LEVEL, MLM
Physical Examination


Vital Signs: 


 Vital Signs











Temperature  98.2 F   01/12/17 10:00


 


Pulse Rate  76   01/12/17 10:00


 


Respiratory Rate  18   01/12/17 10:00


 


Blood Pressure  122/60   01/12/17 10:00


 


O2 Sat by Pulse Oximetry (%)  95   01/12/17 06:00











Constitutional: Yes: Well Nourished, No Distress, Calm


Cardiovascular: Yes: Regular Rate and Rhythm.  No: Gallop, Murmur, Rub


Respiratory: Yes: Regular, CTA Bilaterally.  No: Rales, Rhonchi, Wheezes


Gastrointestinal: Yes: Normal Bowel Sounds, Soft.  No: Distention, Tenderness


Extremities: Yes: WNL


Edema: No


Labs: 


 CBC, BMP





 01/12/17 05:35 





 01/12/17 05:35 











Discharge Summary


Reason For Visit: RENAL INSUFFICIENCY; VOMITING


Current Active Problems





Gastroenteritis (Acute) 


Pancreatic mass (Acute) 


Renal insufficiency (Acute) 


Renal mass (Acute) 


Vomiting (Acute) 








Hospital Course: 


(1) Gastroenteritis


Code(s): K52.9 - NONINFECTIVE GASTROENTERITIS AND COLITIS, UNSPECIFIED





(2) CKD (chronic kidney disease)


Code(s): N18.9 - CHRONIC KIDNEY DISEASE, UNSPECIFIED   Qualifiers: 


     Chronic kidney disease stage: unspecified stage     Qualified Code(s): 

N18.9 - Chronic kidney disease, unspecified  





(3) AICD (automatic cardioverter/defibrillator) present


Code(s): Z95.810 - PRESENCE OF AUTOMATIC (IMPLANTABLE) CARDIAC DEFIBRILLATOR





(4) CHF (congestive heart failure)


Code(s): I50.9 - HEART FAILURE, UNSPECIFIED   Qualifiers: 


     Congestive heart failure type: systolic     Congestive heart failure 

chronicity: chronic     Qualified Code(s): I50.22 - Chronic systolic (congestive

) heart failure  





(5) Renal mass


Code(s): N28.89 - OTHER SPECIFIED DISORDERS OF KIDNEY AND URETER





(6) Pancreatic mass


Code(s): K86.9 - DISEASE OF PANCREAS, UNSPECIFIED





Mr Jerome is a 78 year old male who comes in with gastroenteritis. He was seen 

and admitted. He was monitored and the gastroenteritis resolved. His diet was 

advanced and he tolerated this well. He was not hydrated with IVF secondary to 

end stage CHF. He was continued on milrinone gtt. There was concern for a renal 

mass seen on CT scan, ultrasound was performed and it was cystic. A pancreatic 

mass was also noted, however unable to do MRI or CT scan with contrast. Since 

it is small he was referred to outpatient GI for outpatient EUS. Patient was 

made aware of his need for follow up with GI prior to discharge. Currently he 

is doing well and is without complaint. He is safe for discharge home.





35 minutes spent in preparation of this discharge


Condition: Stable





- Instructions


Diet, Activity, Other Instructions: 


Please make appointment with Dr Seferino Medina to follow up pancreatic 

lesion. Do not take coumadin today, restart it tomorrow 1/13. Resume previous 

diet and activity.


Referrals: 


Sathya Flower MD [Primary Care Provider] - 


Leanna Tatum MD [Staff Physician] - 


Deuce Titus MD [Staff Physician] - 


Darinel Medina DO [Staff Physician] - 


Disposition: HOME





- Home Medications


Comprehensive Discharge Medication List: 


Ambulatory Orders





Allopurinol [Zyloprim -] 100 mg PO DAILY 01/14/16 


Atorvastatin Ca [Lipitor] 40 mg PO HS 01/14/16 


Carvedilol 6.25 mg PO BID 01/14/16 


Docusate Sodium [Colace -] 100 mg PO BID 01/14/16 


Insulin NPL/Insulin Lispro [Humalog Mix 75-25 Vial] 0 unit SQ PRN 01/14/16 


Milrinone Lactate/D5w [Milrinone 0.2 mg/ml in D5w] 0 mg IV DAILY 01/14/16 


Ranitidine [Zantac -] 75 mg PO DAILY 01/14/16 


Torsemide 40 mg PO BID 01/14/16 


Albuterol 0.083% Nebulizer Sol [Ventolin 0.083% Nebulizer Soln -] 1 amp NEB Q6H 

PRN #120 amp 01/18/16 


Nebulizer [Compact Compressor Nebulizer] 1 each MC TID 08/19/16 


Acetaminophen [Tylenol .Regular Strength -] 650 mg PO Q6H PRN #0 tablet 08/27/ 16 


Ferrous Sulfate [Feosol] 325 mg PO Q48H  ud 08/27/16 


Potassium Chloride [K-Dur -] 20 meq PO TID #90 tablet.er 08/27/16 


Warfarin Na [Coumadin -] 4 mg PO DAILY@1800 01/10/17 3

## 2022-07-02 NOTE — ED PROVIDER NOTE - NS ED ROS FT
Constitutional: (-) fever  Eyes/ENT: (-) blurry vision, (-) epistaxis  Cardiovascular: (-) chest pain, (-) syncope  Respiratory: (-) cough, (+) GARCIA  Gastrointestinal: (-) abdominal pain (-) nausea (-) vomiting, (-) diarrhea  Musculoskeletal: (-) neck pain, (-) back pain, (+) right shoulder pain  Integumentary: (-) rash, (-) edema  Neurological: (-) headache, (-) altered mental status  Psychiatric: (-) hallucinations  Allergic/Immunologic: (-) pruritus

## 2022-07-02 NOTE — ED PROVIDER NOTE - CARE PLAN
1 Principal Discharge DX:	Leg swelling  Secondary Diagnosis:	Acute exacerbation of CHF (congestive heart failure)

## 2022-07-02 NOTE — ED PROVIDER NOTE - OBJECTIVE STATEMENT
86 year old female with CHF s/p AICD, pulmonary htn, PE on Eliquis, RA, presents to the ED with bilateral lower extremity swelling and dyspnea on exertion. Patient states that she was recently evaluated at [facility] for chest pain and leg swelling had US that revealed no DVT. Dose of Lasix has been changed multiple times as of recent, now on 20mg daily. Leg swelling worsened last night associated with 6 pounds of weight gain over baseline dry weight of 122. Cardiologist advised an additional 20mg of Lasix last night which seemed to improve swelling. Reports right shoulder pain which she thinks is 2/2 RA pain. Denies chest pain, fever, chills, nausea, vomiting, abdominal pain.

## 2022-07-02 NOTE — ED PROVIDER NOTE - PHYSICAL EXAMINATION
Physical Exam    Constitutional: No acute distress.   Eyes: Conjunctiva pink, Sclera clear, PERRLA, EOMI.  ENT: No sinus tenderness. No nasal discharge. No oropharyngeal erythema, edema, or exudates. Uvula midline.   Cardiovascular: Regular rate, regular rhythm. No noted murmurs rubs or gallops. Bilateral lower extremity edema L>R  Respiratory: unlabored respiratory effort, clear to auscultation bilaterally no wheezing, rales or rhonchi  Gastrointestinal: Normal bowel sounds. soft, non distended, non-tender abdomen.   Musculoskeletal: supple neck, no midline tenderness. No joint or bony deformity.   Integumentary: warm, dry, no rash  Neurologic: awake, alert, cranial nerves II-XII grossly intact, extremities’ motor and sensory functions grossly intact  Psychiatric: appropriate mood, appropriate affect

## 2022-07-02 NOTE — ED ADULT NURSE NOTE - OBJECTIVE STATEMENT
The patient is a 86y Female complaining of swelling of b/l lower extremity swelling since last night . pt. on 20 mg of lasix. pt. states shes congested

## 2022-07-03 VITALS
HEART RATE: 64 BPM | OXYGEN SATURATION: 99 % | SYSTOLIC BLOOD PRESSURE: 151 MMHG | DIASTOLIC BLOOD PRESSURE: 80 MMHG | TEMPERATURE: 99 F | RESPIRATION RATE: 18 BRPM

## 2022-07-03 LAB
ALBUMIN SERPL ELPH-MCNC: 3.7 G/DL — SIGNIFICANT CHANGE UP (ref 3.5–5.2)
ALP SERPL-CCNC: 94 U/L — SIGNIFICANT CHANGE UP (ref 30–115)
ALT FLD-CCNC: 17 U/L — SIGNIFICANT CHANGE UP (ref 0–41)
ANION GAP SERPL CALC-SCNC: 13 MMOL/L — SIGNIFICANT CHANGE UP (ref 7–14)
AST SERPL-CCNC: 39 U/L — SIGNIFICANT CHANGE UP (ref 0–41)
BILIRUB SERPL-MCNC: 0.3 MG/DL — SIGNIFICANT CHANGE UP (ref 0.2–1.2)
BUN SERPL-MCNC: 75 MG/DL — CRITICAL HIGH (ref 10–20)
CALCIUM SERPL-MCNC: 8.8 MG/DL — SIGNIFICANT CHANGE UP (ref 8.5–10.1)
CHLORIDE SERPL-SCNC: 102 MMOL/L — SIGNIFICANT CHANGE UP (ref 98–110)
CO2 SERPL-SCNC: 22 MMOL/L — SIGNIFICANT CHANGE UP (ref 17–32)
CREAT SERPL-MCNC: 3 MG/DL — HIGH (ref 0.7–1.5)
EGFR: 15 ML/MIN/1.73M2 — LOW
GLUCOSE SERPL-MCNC: 87 MG/DL — SIGNIFICANT CHANGE UP (ref 70–99)
POTASSIUM SERPL-MCNC: 5.5 MMOL/L — HIGH (ref 3.5–5)
POTASSIUM SERPL-SCNC: 5.5 MMOL/L — HIGH (ref 3.5–5)
PROT SERPL-MCNC: 6.6 G/DL — SIGNIFICANT CHANGE UP (ref 6–8)
SODIUM SERPL-SCNC: 137 MMOL/L — SIGNIFICANT CHANGE UP (ref 135–146)
TROPONIN T SERPL-MCNC: 0.03 NG/ML — CRITICAL HIGH

## 2022-07-03 PROCEDURE — 93010 ELECTROCARDIOGRAM REPORT: CPT

## 2022-07-03 PROCEDURE — 99217: CPT

## 2022-07-03 NOTE — ED CDU PROVIDER DISPOSITION NOTE - PATIENT PORTAL LINK FT
You can access the FollowMyHealth Patient Portal offered by [facility] [place] by registering at the following website: [email or web address]. By joining [facility]’s FollowMyHealth portal, you will also be able to view your health information using other applications (apps) compatible with our system.

## 2022-07-03 NOTE — ED CDU PROVIDER DISPOSITION NOTE - CLINICAL COURSE
Pt placed in OBS for chf/ fluid overload. No acute obs events. Pt remained comfortable. trop stable. Pt felt better after diuresis. recc patient to have pmd fup to adjustments of meds. Pt stable for dc w/ continued oupt w/up, pmd f/up, and care as discussed.  Pt understands plan and signs and symptoms for ED return. DC home.     .

## 2022-07-05 NOTE — ED ADULT NURSE NOTE - NSIMPLEMENTINTERV_GEN_ALL_ED
PAST MEDICAL HISTORY:  DM (diabetes mellitus) type 2    HTN (hypertension) no longer on meds due to hx of falls    Hyperlipidemia     Hypothyroid     
Implemented All Universal Safety Interventions:  [place] to call system. Call bell, personal items and telephone within reach. Instruct patient to call for assistance. Room bathroom lighting operational. Non-slip footwear when patient is off stretcher. Physically safe environment: no spills, clutter or unnecessary equipment. Stretcher in lowest position, wheels locked, appropriate side rails in place.

## 2022-07-14 ENCOUNTER — APPOINTMENT (OUTPATIENT)
Dept: PAIN MANAGEMENT | Facility: CLINIC | Age: 87
End: 2022-07-14

## 2022-07-17 ENCOUNTER — INPATIENT (INPATIENT)
Facility: HOSPITAL | Age: 87
LOS: 1 days | Discharge: HOME HEALTH | End: 2022-07-19
Attending: STUDENT IN AN ORGANIZED HEALTH CARE EDUCATION/TRAINING PROGRAM | Admitting: STUDENT IN AN ORGANIZED HEALTH CARE EDUCATION/TRAINING PROGRAM

## 2022-07-17 VITALS
SYSTOLIC BLOOD PRESSURE: 136 MMHG | RESPIRATION RATE: 20 BRPM | DIASTOLIC BLOOD PRESSURE: 62 MMHG | HEIGHT: 63 IN | OXYGEN SATURATION: 100 % | HEART RATE: 69 BPM | TEMPERATURE: 98 F

## 2022-07-17 DIAGNOSIS — Z95.0 PRESENCE OF CARDIAC PACEMAKER: Chronic | ICD-10-CM

## 2022-07-17 DIAGNOSIS — Z90.710 ACQUIRED ABSENCE OF BOTH CERVIX AND UTERUS: Chronic | ICD-10-CM

## 2022-07-17 DIAGNOSIS — Z90.49 ACQUIRED ABSENCE OF OTHER SPECIFIED PARTS OF DIGESTIVE TRACT: Chronic | ICD-10-CM

## 2022-07-17 DIAGNOSIS — Z90.89 ACQUIRED ABSENCE OF OTHER ORGANS: Chronic | ICD-10-CM

## 2022-07-17 DIAGNOSIS — Z98.890 OTHER SPECIFIED POSTPROCEDURAL STATES: Chronic | ICD-10-CM

## 2022-07-17 LAB
ALBUMIN SERPL ELPH-MCNC: 3.8 G/DL — SIGNIFICANT CHANGE UP (ref 3.5–5.2)
ALP SERPL-CCNC: 113 U/L — SIGNIFICANT CHANGE UP (ref 30–115)
ALT FLD-CCNC: 18 U/L — SIGNIFICANT CHANGE UP (ref 0–41)
ANION GAP SERPL CALC-SCNC: 13 MMOL/L — SIGNIFICANT CHANGE UP (ref 7–14)
APPEARANCE UR: ABNORMAL
AST SERPL-CCNC: 36 U/L — SIGNIFICANT CHANGE UP (ref 0–41)
BACTERIA # UR AUTO: NEGATIVE — SIGNIFICANT CHANGE UP
BASOPHILS # BLD AUTO: 0.03 K/UL — SIGNIFICANT CHANGE UP (ref 0–0.2)
BASOPHILS NFR BLD AUTO: 0.5 % — SIGNIFICANT CHANGE UP (ref 0–1)
BILIRUB SERPL-MCNC: <0.2 MG/DL — SIGNIFICANT CHANGE UP (ref 0.2–1.2)
BILIRUB UR-MCNC: NEGATIVE — SIGNIFICANT CHANGE UP
BLD GP AB SCN SERPL QL: SIGNIFICANT CHANGE UP
BLD GP AB SCN SERPL QL: SIGNIFICANT CHANGE UP
BUN SERPL-MCNC: 73 MG/DL — CRITICAL HIGH (ref 10–20)
CALCIUM SERPL-MCNC: 8.5 MG/DL — SIGNIFICANT CHANGE UP (ref 8.5–10.1)
CHLORIDE SERPL-SCNC: 103 MMOL/L — SIGNIFICANT CHANGE UP (ref 98–110)
CK MB CFR SERPL CALC: 2.5 NG/ML — SIGNIFICANT CHANGE UP (ref 0.6–6.3)
CK SERPL-CCNC: 118 U/L — SIGNIFICANT CHANGE UP (ref 0–225)
CO2 SERPL-SCNC: 21 MMOL/L — SIGNIFICANT CHANGE UP (ref 17–32)
COLOR SPEC: SIGNIFICANT CHANGE UP
CREAT SERPL-MCNC: 3.3 MG/DL — HIGH (ref 0.7–1.5)
DIFF PNL FLD: ABNORMAL
EGFR: 13 ML/MIN/1.73M2 — LOW
EOSINOPHIL # BLD AUTO: 0.36 K/UL — SIGNIFICANT CHANGE UP (ref 0–0.7)
EOSINOPHIL NFR BLD AUTO: 5.5 % — SIGNIFICANT CHANGE UP (ref 0–8)
EPI CELLS # UR: 1 /HPF — SIGNIFICANT CHANGE UP (ref 0–5)
GLUCOSE SERPL-MCNC: 105 MG/DL — HIGH (ref 70–99)
GLUCOSE UR QL: NEGATIVE — SIGNIFICANT CHANGE UP
HCT VFR BLD CALC: 24.8 % — LOW (ref 37–47)
HCT VFR BLD CALC: 28.3 % — LOW (ref 37–47)
HGB BLD-MCNC: 7.8 G/DL — LOW (ref 12–16)
HGB BLD-MCNC: 8.9 G/DL — LOW (ref 12–16)
HYALINE CASTS # UR AUTO: 3 /LPF — SIGNIFICANT CHANGE UP (ref 0–7)
IMM GRANULOCYTES NFR BLD AUTO: 0.6 % — HIGH (ref 0.1–0.3)
KETONES UR-MCNC: NEGATIVE — SIGNIFICANT CHANGE UP
LEUKOCYTE ESTERASE UR-ACNC: ABNORMAL
LYMPHOCYTES # BLD AUTO: 2.11 K/UL — SIGNIFICANT CHANGE UP (ref 1.2–3.4)
LYMPHOCYTES # BLD AUTO: 32.5 % — SIGNIFICANT CHANGE UP (ref 20.5–51.1)
MCHC RBC-ENTMCNC: 29.3 PG — SIGNIFICANT CHANGE UP (ref 27–31)
MCHC RBC-ENTMCNC: 29.7 PG — SIGNIFICANT CHANGE UP (ref 27–31)
MCHC RBC-ENTMCNC: 31.4 G/DL — LOW (ref 32–37)
MCHC RBC-ENTMCNC: 31.5 G/DL — LOW (ref 32–37)
MCV RBC AUTO: 93.2 FL — SIGNIFICANT CHANGE UP (ref 81–99)
MCV RBC AUTO: 94.3 FL — SIGNIFICANT CHANGE UP (ref 81–99)
MONOCYTES # BLD AUTO: 0.78 K/UL — HIGH (ref 0.1–0.6)
MONOCYTES NFR BLD AUTO: 12 % — HIGH (ref 1.7–9.3)
NEUTROPHILS # BLD AUTO: 3.18 K/UL — SIGNIFICANT CHANGE UP (ref 1.4–6.5)
NEUTROPHILS NFR BLD AUTO: 48.9 % — SIGNIFICANT CHANGE UP (ref 42.2–75.2)
NITRITE UR-MCNC: NEGATIVE — SIGNIFICANT CHANGE UP
NRBC # BLD: 0 /100 WBCS — SIGNIFICANT CHANGE UP (ref 0–0)
NRBC # BLD: 0 /100 WBCS — SIGNIFICANT CHANGE UP (ref 0–0)
NT-PROBNP SERPL-SCNC: 9716 PG/ML — HIGH (ref 0–300)
PH UR: 7 — SIGNIFICANT CHANGE UP (ref 5–8)
PLATELET # BLD AUTO: 221 K/UL — SIGNIFICANT CHANGE UP (ref 130–400)
PLATELET # BLD AUTO: 235 K/UL — SIGNIFICANT CHANGE UP (ref 130–400)
POTASSIUM SERPL-MCNC: 4.5 MMOL/L — SIGNIFICANT CHANGE UP (ref 3.5–5)
POTASSIUM SERPL-SCNC: 4.5 MMOL/L — SIGNIFICANT CHANGE UP (ref 3.5–5)
PROT SERPL-MCNC: 6.6 G/DL — SIGNIFICANT CHANGE UP (ref 6–8)
PROT UR-MCNC: ABNORMAL
RBC # BLD: 2.66 M/UL — LOW (ref 4.2–5.4)
RBC # BLD: 3 M/UL — LOW (ref 4.2–5.4)
RBC # FLD: 15.7 % — HIGH (ref 11.5–14.5)
RBC # FLD: 15.7 % — HIGH (ref 11.5–14.5)
RBC CASTS # UR COMP ASSIST: 12 /HPF — HIGH (ref 0–4)
SARS-COV-2 RNA SPEC QL NAA+PROBE: SIGNIFICANT CHANGE UP
SODIUM SERPL-SCNC: 137 MMOL/L — SIGNIFICANT CHANGE UP (ref 135–146)
SP GR SPEC: 1.01 — SIGNIFICANT CHANGE UP (ref 1.01–1.03)
TROPONIN T SERPL-MCNC: 0.01 NG/ML — SIGNIFICANT CHANGE UP
TROPONIN T SERPL-MCNC: 0.02 NG/ML — HIGH
UROBILINOGEN FLD QL: SIGNIFICANT CHANGE UP
WBC # BLD: 6.5 K/UL — SIGNIFICANT CHANGE UP (ref 4.8–10.8)
WBC # BLD: 6.73 K/UL — SIGNIFICANT CHANGE UP (ref 4.8–10.8)
WBC # FLD AUTO: 6.5 K/UL — SIGNIFICANT CHANGE UP (ref 4.8–10.8)
WBC # FLD AUTO: 6.73 K/UL — SIGNIFICANT CHANGE UP (ref 4.8–10.8)
WBC UR QL: 36 /HPF — HIGH (ref 0–5)

## 2022-07-17 PROCEDURE — 71046 X-RAY EXAM CHEST 2 VIEWS: CPT | Mod: 26

## 2022-07-17 PROCEDURE — 73060 X-RAY EXAM OF HUMERUS: CPT | Mod: 26,RT

## 2022-07-17 PROCEDURE — 99285 EMERGENCY DEPT VISIT HI MDM: CPT | Mod: GC

## 2022-07-17 PROCEDURE — 93010 ELECTROCARDIOGRAM REPORT: CPT

## 2022-07-17 PROCEDURE — 73030 X-RAY EXAM OF SHOULDER: CPT | Mod: 26,RT

## 2022-07-17 RX ORDER — FUROSEMIDE 40 MG
40 TABLET ORAL DAILY
Refills: 0 | Status: DISCONTINUED | OUTPATIENT
Start: 2022-07-17 | End: 2022-07-19

## 2022-07-17 RX ORDER — CALCIUM ACETATE 667 MG
1334 TABLET ORAL
Refills: 0 | Status: DISCONTINUED | OUTPATIENT
Start: 2022-07-17 | End: 2022-07-19

## 2022-07-17 RX ORDER — METOPROLOL TARTRATE 50 MG
25 TABLET ORAL DAILY
Refills: 0 | Status: DISCONTINUED | OUTPATIENT
Start: 2022-07-17 | End: 2022-07-19

## 2022-07-17 RX ORDER — ASPIRIN/CALCIUM CARB/MAGNESIUM 324 MG
81 TABLET ORAL DAILY
Refills: 0 | Status: DISCONTINUED | OUTPATIENT
Start: 2022-07-17 | End: 2022-07-19

## 2022-07-17 RX ORDER — PARICALCITOL 2 UG/1
1 CAPSULE, GELATIN COATED ORAL
Qty: 0 | Refills: 0 | DISCHARGE

## 2022-07-17 RX ORDER — SERTRALINE 25 MG/1
25 TABLET, FILM COATED ORAL DAILY
Refills: 0 | Status: DISCONTINUED | OUTPATIENT
Start: 2022-07-17 | End: 2022-07-19

## 2022-07-17 RX ORDER — FUROSEMIDE 40 MG
1 TABLET ORAL
Qty: 0 | Refills: 0 | DISCHARGE

## 2022-07-17 RX ORDER — LATANOPROST 0.05 MG/ML
1 SOLUTION/ DROPS OPHTHALMIC; TOPICAL AT BEDTIME
Refills: 0 | Status: DISCONTINUED | OUTPATIENT
Start: 2022-07-17 | End: 2022-07-19

## 2022-07-17 RX ORDER — FOLIC ACID 0.8 MG
1 TABLET ORAL
Qty: 0 | Refills: 0 | DISCHARGE

## 2022-07-17 RX ORDER — OXYCODONE AND ACETAMINOPHEN 5; 325 MG/1; MG/1
1 TABLET ORAL EVERY 6 HOURS
Refills: 0 | Status: DISCONTINUED | OUTPATIENT
Start: 2022-07-17 | End: 2022-07-19

## 2022-07-17 RX ORDER — TIMOLOL 0.5 %
1 DROPS OPHTHALMIC (EYE)
Refills: 0 | Status: DISCONTINUED | OUTPATIENT
Start: 2022-07-17 | End: 2022-07-19

## 2022-07-17 RX ORDER — ISOSORBIDE MONONITRATE 60 MG/1
30 TABLET, EXTENDED RELEASE ORAL DAILY
Refills: 0 | Status: DISCONTINUED | OUTPATIENT
Start: 2022-07-17 | End: 2022-07-19

## 2022-07-17 RX ORDER — CALCIUM ACETATE 667 MG
3 TABLET ORAL
Qty: 0 | Refills: 0 | DISCHARGE

## 2022-07-17 RX ORDER — AMLODIPINE BESYLATE 2.5 MG/1
5 TABLET ORAL DAILY
Refills: 0 | Status: DISCONTINUED | OUTPATIENT
Start: 2022-07-17 | End: 2022-07-19

## 2022-07-17 RX ORDER — AZTREONAM 2 G
2000 VIAL (EA) INJECTION ONCE
Refills: 0 | Status: COMPLETED | OUTPATIENT
Start: 2022-07-17 | End: 2022-07-17

## 2022-07-17 RX ORDER — CALCIUM ACETATE 667 MG
2 TABLET ORAL
Qty: 0 | Refills: 0 | DISCHARGE

## 2022-07-17 RX ORDER — ISOSORBIDE MONONITRATE 60 MG/1
1 TABLET, EXTENDED RELEASE ORAL
Qty: 0 | Refills: 0 | DISCHARGE

## 2022-07-17 RX ORDER — ATORVASTATIN CALCIUM 80 MG/1
20 TABLET, FILM COATED ORAL AT BEDTIME
Refills: 0 | Status: DISCONTINUED | OUTPATIENT
Start: 2022-07-17 | End: 2022-07-19

## 2022-07-17 RX ADMIN — Medication 1 DROP(S): at 17:35

## 2022-07-17 RX ADMIN — ATORVASTATIN CALCIUM 20 MILLIGRAM(S): 80 TABLET, FILM COATED ORAL at 22:59

## 2022-07-17 RX ADMIN — Medication 100 MILLIGRAM(S): at 17:55

## 2022-07-17 NOTE — H&P ADULT - NSHPLANGLIMITEDENGLISH_GEN_A_CORE
Coronavirus (COVID-19) Notification    Caller Name (Patient, parent, daughter/son, grandparent, etc)  PATIENT  Already completed treatment, 20 day quarantine    Reason for call  Notify of Positive Coronavirus (COVID-19) lab results, assess symptoms,  review [facility] recommendations    Lab Result    Lab test:  2019-nCoV rRt-PCR or SARS-CoV-2 PCR    Oropharyngeal AND/OR nasopharyngeal swabs is POSITIVE for 2019-nCoV RNA/SARS-COV-2 PCR (COVID-19 virus)      Gather patient reported symptoms   Assessment   Current Symptoms at time of phone call, reported by patient: (if no symptoms, document: No symptoms] No symptoms   Date of symptom(s) onset (if applicable) 5/7     If at time of call, Patients symptoms have worsened, the Patient should contact 911 or have someone drive them to Emergency Dept promptly:      If Patient calling 911, inform 911 personal that you have tested positive for the Coronavirus (COVID-19).  Place mask on and await 911 to arrive.    If Emergency Dept, If possible, please have another adult drive you to the Emergency Dept but you need to wear mask when in contact with other people.      Treatment Options:   Patient classified as COVID treatment eligible by Epic high risk algorithm: Yes  Is the patient symptomatic at the time of result notification? No    Review information with Patient    Your result was positive. This means you have COVID-19 (coronavirus).    How can I protect others?    These guidelines are for isolating before returning to work, school or .    If you DO have symptoms    Stay home and away from others     For at least 5 days after your symptoms started, AND    You are fever free for 24 hours (with no medicine that reduces fever), AND    Your other symptoms are better    Wear a mask for 10 full days anytime you are around others    If you DON'T have symptoms    Stay home and away from others for at least 5 days after your positive test    Wear a mask for 10 full days  anytime you are around others    There may be different guidelines for healthcare facilities.  Please check with the specific sites before arriving.    If you have been told by a doctor that you were severely ill with COVID-19 or are immunocompromised, you should isolate for at least 10 days.    You should not go back to work until you meet the guidelines above for ending your home isolation. You don't need to be retested for COVID-19 before going back to work--studies show that you won't spread the virus if it's been at least 10 days since your symptoms started (or 20 days, if you have a weak immune system).    Employers, schools, and daycares: This is an official notice for this person's medical guidelines for returning in-person.  They must meet the above guidelines before going back to work, school or  in person.    You will receive a positive COVID-19 letter via [facility] or the mail soon with additional self-care information (exception, no letters will be sent to presurgical/preprocedure patients).    Would you like me to review some of that information with you now?  No    If you were tested for an upcoming procedure, please contact your provider for next steps.    Laquita Moore       No

## 2022-07-17 NOTE — ED PROVIDER NOTE - ATTENDING CONTRIBUTION TO CARE
86-year-old female with past medical history of CHF status post AICD, remote PE on Eliquis, pulmonary hypertension, rheumatoid arthritis, presents with right arm pain since yesterday, radiating to the right chest, reports throbbing in nature, initially intermittent now constant, mild in intensity, progressively worsening, worse with movement, better at rest.  Patient states she has had this pain in the past but it seemed more severe today.  Patient was admitted to [facility] in July 2, 2022, had lower extremity duplex that was negative for DVT.  Patient reports had a stress test in 2020 that was normal.  No fever, chills, n/v, sob, pleuritic cp, palpitations, diaphoresis, cough, ha/lh/dizziness, numbness/tingling, neck pain/ stiffness, abd pain, diarrhea, constipation, melena/brbpr, urinary symptoms, trauma, weakness, edema, calf pain/swelling/erythema, sick contacts, recent travel or rash.     on exam: non toxic appearing pt sitting on stretcher speaking full sentences, no rash, mmm, neck supple. non-tender , radial pulses 2/4 b/l, no jvd, no pain to palpation to chest wall, no pain with movement/ not reproducible, ctabl w/ breath sounds present b/l, no wheezing or crackles, no accessory muscle use, no tachypnea, no stridor, bs present throughout all 4 quadrants, abd soft, nd, nt, no rebound tenderness or guarding, no cvat, FROM of ext, no calf pain/swelling/erythema, TTP to R anterior shoulder but able to fully rang, (-) Drop arm, no deformity, no pain tp palpation to humerus, elbow, wrist, no snuff box tenderness, n/v, intact.  AAOx3. motor 5/5 and sensation intact throughout upper and lower ext. no focal deficits.

## 2022-07-17 NOTE — ED PROVIDER NOTE - OBJECTIVE STATEMENT
PT is an 86F with PMH of CHF s/p AICD, pulmonary HTN, PE on elliquis, RA p/w RT sided chest, arm pain. PT denies trauma. States that she has often had similar pain in the past, however, today it is more severe than it has even been. PT says pain began Thursday night, has progressively worsened since then. PT is worse with movement. PT denies diaphoresis, nausea, vomiting. PT denies SOB, cough, fever. PT denies urinary or other symptoms. PT denies BRBPR or melena.

## 2022-07-17 NOTE — ED ADULT NURSE REASSESSMENT NOTE - NS ED NURSE REASSESS COMMENT FT1
Pt is A&Ox3 came for chest pain with low hgb. Pending med admit and is waiting for bed. Meals provided. pt denies cp at this moment. comfort measure in place. Will continue to monitor pt.

## 2022-07-17 NOTE — ED PROVIDER NOTE - PHYSICAL EXAMINATION
CONSTITUTIONAL: Well-developed; well-nourished; NAD  SKIN: warm, dry, w/o rash  HEAD: NCAT  EYES: PERRLA, EOMI, no conjunctival injection  ENT: No nasal discharge; nl OP without erythema or exudates  NECK: Supple, non-tender  CARD: nl S1, S2; RRR, no MRG, no JVD  RESP: CTAB, normal respiratory effort  ABD: BS+, soft, NTND, no HSM  Rectal: negative for BRBPR or melena  EXT: Normal ROM. TTP of the RT shoulder, nl sensation over the deltoid, RUM motor and sensory intact in RT extremity; No clubbing, cyanosis or edema  NEURO: Alert, oriented, grossly unremarkable  PSYCH: Cooperative, appropriate

## 2022-07-17 NOTE — ED PROVIDER NOTE - PROGRESS NOTE DETAILS
Hb 7.8, transfusion discussed wtiht PT, PT is a Jehovas Witness and declines any blood products despite all risks including death, PT verbilized understanding for risks -CD ED Attending MISHA Miranda  Hemoglobin 7.8, patient reports she is supposed to be on iron/receiving iron transfusions, rectal tone, no melena or bright red blood per rectum, patient refused blood transfusion in the ED.  BUN/creatinine 73/3.3, previous 75/3, troponin 0.02, EKG with no ST elevations or depressions, previous troponin 0.03.  BNP 9716, in July 9501.  pH 7.39, CO2 34, bicarb 21.  Patient aware of plan for admission, ACS work-up as well as for her anemia.  Will discuss with medical admitting team admit

## 2022-07-17 NOTE — ED ADULT NURSE NOTE - OBJECTIVE STATEMENT
Pt c/o pain from right shoulder radiating down right arm. Pt states pain started yesterday but got worst today. Pt took Tylenol at 7am with only partial relief. HHA at bedside. Pt denies chest pain at this time. VSS.

## 2022-07-17 NOTE — H&P ADULT - ASSESSMENT
This is a 86 year old female ([employer]) with PMX HFrEF 25%, s/p AICD, pulm HTN, provoked PE/DVT on Eliquis, DLD, glaucoma and CKD 4 presents to ED for "right shoulder pain, radiating to elbow and neck" that got worse last night. Found to have an acute drop in hb    #Symptomatic Anemia  - Hb 7.8 from 10.1 in 07/07/2022  - No signs of active bleeding   - Hemodynamically stable   - DAPHNE shows brown stool   - s/p EGD and colonoscopy in 2020 with non-specific gastritis and Diverticulosis - recommendation was to repeat colonoscopy in 1 year, which she did not   - NO BLOOD TRANSFUSIONS AS PER PT WISHES, JEHOVA'S WITNESS  - Monitor H+H  - Keep active T+S   - F/u with GI consult     #Right shoulder pain radiating to the neck and elbow   - Unilateral Joint pain   - Previous shoulder xray shows degenerative changes of the glenohumeral and acromioclavicular joints noted.  - Previous labs are significant for elevated uric acid and inflammatory markers, negative cyclic citrullinated peptide   - Patient was evaluated by rheumatology in the past  - F/u repeat Inflammatory markers ESR, CRP, Uric acid level, RF, CCP, if elevated may benefit from short course of prednisone.   - F/u Xray reads   - c/w with pain control, muscle relaxants   - physiatry/PT consult     #Troponemia in the setting of CKD4   - Troponin .02  - F/u repeat trops     #CKD stage 4  - Cr 3.3 (around baseline)  - Follows Dr. Urrutia   - Avoid nephrotoxic agents     # ADHF / HFrEF, EF 25-30%  - TTE 3/2022: depressed LVEF 20-25%; moderate mitral and tricuspid regurgitation; enlarged LA  - c/w home dose of Imdur, Metoprolol, Lasix,  no ACEi/ARB/Entresto  - c/w Atorvastatin 20mg daily  - on aspirin, eliquis; no Plavix  - NO ACE inhibitors/ ARB/ Entresto/ spironolactone since patient can become severely hyperkalemic as per cardiology     #HTN  - c/w Amlodipine   - NO ACE inhibitors/ ARB/ Entresto/ spironolactone since patient can become severely hyperkalemic     #Hx of PE/DVT  - Hx of provoked PE in the past and soleal vein thrombosis years ago?  - On eliquis 2.5 mg bid for now at home   - On hold due to acute drop in Hb     #HDL  - c/w atorvastatin 20  - F/u lipid Panel     #Glaucoma  - c/w eye drops    #Misc   - DVT prophylaxis: on hold due to acute drop in Hb   - GI prophylaxis: PPI   - Diet: DASH   - Activity status: IAT   - Disposition: admit to medicine

## 2022-07-17 NOTE — ED PROVIDER NOTE - NS ED ROS FT
CONSTITUTIONAL - No acute distress , No weight change  SKIN - No rash  HEMATOLOGIC - No abnormal bleeding or bruising  EYES - , No conjunctival injection, No drainage   ENT -, No sore throat, No neck pain, No rhinorrhea, No ear pain  RESPIRATORY - No shortness of breath, No cough  CARDIAC -+ chest pain, No palpitations  GI - No abdominal pain, No nausea, No vomiting, No diarrhea, No constipation, No bright red blood per rectum or melena. No flank pain  - No dysuria, frequency, hematuria  MUSCULOSKELETAL - +RT shoulder pain, No swelling, No back pain  NEUROLOGIC - No numbness, No focal weakness, No headache, No dizziness  ALLERGIC- no pruritis

## 2022-07-17 NOTE — H&P ADULT - ATTENDING COMMENTS
Patient seen and examined. Case discussed with resident team    still has pain in the neck going to shoulder and to arm  Denies any complains of chest pain or shortness of breath  Denies any abdominal pain/nausea/vomiting    VITALS:  T(F): 98.1  HR: 83  BP: 148/67  RR: 18  SpO2: 99%    PHYSICAL EXAM  GEN: no distress, comfortable  PULM: BS heard b/l equal, No wheezing  CVS: S1S2 present, no rubs or gallops  ABD: Soft, non-distended, no guarding; non-tender  EXT: No lower extremity edema  bilateral pulses bilaterally equal on upper and lower extremity  ROm normal in Right shoulder  NEURO: A&Ox3, good bilateral strength upper extremity; normal sensation b/l      A&P    # right neck pain - musculoskeletal etiology  tenderness to palpation to trapezius and sternocleidomastoid area  normal ROM in righ shoulder  warm compress  percocet PRN  PT eval    # normocytic anemia  patient on chronic anticoagulation- possibly related to chronic blood loss  no obvious blood loss reported  high ferritin level, high folate and B12 from march    # h/o PE and DVT- per patient happened more than 2 years before when she was in [place]  reviewed multiple VTE duplex done here- no evidence of blood clot  eliquis on hold  monitor hg    # CKD-4  # HFrEF- compensated; continue home meds  # HTN  # HLP  # glaucoma    Pending: improvement in symptoms; PT follow up; monitor hg  Dispo: home (patient lives with family)

## 2022-07-17 NOTE — H&P ADULT - HISTORY OF PRESENT ILLNESS
This is a 86 year old female ([employer]) with PMX HFrEF 25%, s/p AICD, pulm HTN, provoked PE/DVT on Eliquis, DLD, glaucoma and CKD 4 presents to ED for "right shoulder pain, radiating to elbow and neck" that got worse last night. Patient denies trauma, states that she has often had similar pain in the past, however, today it is more severe than it has even been. Patient says pain began Thursday night, has progressively worsened since then. Patient has difficulty with ROM. Patient denies any chest pain but has Right shoulder pain that's radiating to the right upper chest. Of note patient was recently seen in the ED for right shoulder pain. Patient denies headache, dizziness, fever, chills. Patient endorses intermittent SOB, denies chest pain, palpitation. Patient denies abdominal pain, n/v/d/c. Patient denies melena, hematochezia, hematemesis     ED Vitals T(F): 97.5, HR: 70, BP: 133/65, RR: 18, SpO2: 100%  Labs significant for Hb 7.8, HCT 24.8, WBC 6.50, , Trops .02  137  |  103  |  73<HH>  ----------------------------<  105<H>  4.5   |  21  |  3.3<H>  TPro  6.6  /  Alb  3.8  /  TBili  <0.2  /  DBili  x   /  AST  36  /  ALT  18  /  AlkPhos  113  07-17

## 2022-07-17 NOTE — ED ADULT NURSE NOTE - INTERVENTIONS DEFINITIONS
[place] to call system/Call bell, personal items and telephone within reach/Instruct patient to call for assistance/Room bathroom lighting operational/Non-slip footwear when patient is off stretcher/Physically safe environment: no spills, clutter or unnecessary equipment/Stretcher in lowest position, wheels locked, appropriate side rails in place/Monitor gait and stability/Reinforce activity limits and safety measures with patient and family

## 2022-07-17 NOTE — ED PROVIDER NOTE - CARE PLAN
1 Principal Discharge DX:	Chest pain  Secondary Diagnosis:	Right shoulder pain  Secondary Diagnosis:	Hemoglobin low   Principal Discharge DX:	Chest pain  Secondary Diagnosis:	Right shoulder pain  Secondary Diagnosis:	Hemoglobin low  Secondary Diagnosis:	Acute UTI

## 2022-07-18 LAB
ALBUMIN SERPL ELPH-MCNC: 3.3 G/DL — LOW (ref 3.5–5.2)
ALP SERPL-CCNC: 101 U/L — SIGNIFICANT CHANGE UP (ref 30–115)
ALT FLD-CCNC: 18 U/L — SIGNIFICANT CHANGE UP (ref 0–41)
ANION GAP SERPL CALC-SCNC: 12 MMOL/L — SIGNIFICANT CHANGE UP (ref 7–14)
APTT BLD: 33.4 SEC — SIGNIFICANT CHANGE UP (ref 27–39.2)
AST SERPL-CCNC: 42 U/L — HIGH (ref 0–41)
BILIRUB SERPL-MCNC: <0.2 MG/DL — SIGNIFICANT CHANGE UP (ref 0.2–1.2)
BUN SERPL-MCNC: 75 MG/DL — CRITICAL HIGH (ref 10–20)
CALCIUM SERPL-MCNC: 8.5 MG/DL — SIGNIFICANT CHANGE UP (ref 8.5–10.1)
CHLORIDE SERPL-SCNC: 106 MMOL/L — SIGNIFICANT CHANGE UP (ref 98–110)
CO2 SERPL-SCNC: 19 MMOL/L — SIGNIFICANT CHANGE UP (ref 17–32)
CREAT SERPL-MCNC: 3.2 MG/DL — HIGH (ref 0.7–1.5)
CRP SERPL-MCNC: 3 MG/L — SIGNIFICANT CHANGE UP
EGFR: 14 ML/MIN/1.73M2 — LOW
ERYTHROCYTE [SEDIMENTATION RATE] IN BLOOD: 80 MM/HR — HIGH (ref 0–20)
FERRITIN SERPL-MCNC: 107 NG/ML — SIGNIFICANT CHANGE UP (ref 15–150)
FOLATE SERPL-MCNC: >20 NG/ML — SIGNIFICANT CHANGE UP
GLUCOSE SERPL-MCNC: 107 MG/DL — HIGH (ref 70–99)
HCT VFR BLD CALC: 23.4 % — LOW (ref 37–47)
HGB BLD-MCNC: 7.4 G/DL — LOW (ref 12–16)
INR BLD: 1.18 RATIO — SIGNIFICANT CHANGE UP (ref 0.65–1.3)
MAGNESIUM SERPL-MCNC: 1.8 MG/DL — SIGNIFICANT CHANGE UP (ref 1.8–2.4)
MCHC RBC-ENTMCNC: 29.5 PG — SIGNIFICANT CHANGE UP (ref 27–31)
MCHC RBC-ENTMCNC: 31.6 G/DL — LOW (ref 32–37)
MCV RBC AUTO: 93.2 FL — SIGNIFICANT CHANGE UP (ref 81–99)
NRBC # BLD: 0 /100 WBCS — SIGNIFICANT CHANGE UP (ref 0–0)
PLATELET # BLD AUTO: 201 K/UL — SIGNIFICANT CHANGE UP (ref 130–400)
POTASSIUM SERPL-MCNC: 4.6 MMOL/L — SIGNIFICANT CHANGE UP (ref 3.5–5)
POTASSIUM SERPL-SCNC: 4.6 MMOL/L — SIGNIFICANT CHANGE UP (ref 3.5–5)
PROT SERPL-MCNC: 5.6 G/DL — LOW (ref 6–8)
PROTHROM AB SERPL-ACNC: 13.6 SEC — HIGH (ref 9.95–12.87)
RBC # BLD: 2.51 M/UL — LOW (ref 4.2–5.4)
RBC # FLD: 15.6 % — HIGH (ref 11.5–14.5)
RHEUMATOID FACT SERPL-ACNC: 31 IU/ML — HIGH (ref 0–13)
SARS-COV-2 RNA SPEC QL NAA+PROBE: SIGNIFICANT CHANGE UP
SODIUM SERPL-SCNC: 137 MMOL/L — SIGNIFICANT CHANGE UP (ref 135–146)
URATE SERPL-MCNC: 8.2 MG/DL — HIGH (ref 2.5–7)
VIT B12 SERPL-MCNC: >2000 PG/ML — HIGH (ref 232–1245)
WBC # BLD: 5.66 K/UL — SIGNIFICANT CHANGE UP (ref 4.8–10.8)
WBC # FLD AUTO: 5.66 K/UL — SIGNIFICANT CHANGE UP (ref 4.8–10.8)

## 2022-07-18 PROCEDURE — 99222 1ST HOSP IP/OBS MODERATE 55: CPT

## 2022-07-18 PROCEDURE — 99223 1ST HOSP IP/OBS HIGH 75: CPT

## 2022-07-18 RX ADMIN — Medication 40 MILLIGRAM(S): at 05:25

## 2022-07-18 RX ADMIN — Medication 81 MILLIGRAM(S): at 11:25

## 2022-07-18 RX ADMIN — Medication 1334 MILLIGRAM(S): at 17:37

## 2022-07-18 RX ADMIN — Medication 1 DROP(S): at 05:22

## 2022-07-18 RX ADMIN — LATANOPROST 1 DROP(S): 0.05 SOLUTION/ DROPS OPHTHALMIC; TOPICAL at 23:01

## 2022-07-18 RX ADMIN — Medication 1334 MILLIGRAM(S): at 12:09

## 2022-07-18 RX ADMIN — ISOSORBIDE MONONITRATE 30 MILLIGRAM(S): 60 TABLET, EXTENDED RELEASE ORAL at 11:24

## 2022-07-18 RX ADMIN — Medication 1 DROP(S): at 17:38

## 2022-07-18 RX ADMIN — ATORVASTATIN CALCIUM 20 MILLIGRAM(S): 80 TABLET, FILM COATED ORAL at 23:01

## 2022-07-18 RX ADMIN — Medication 1334 MILLIGRAM(S): at 08:36

## 2022-07-18 RX ADMIN — Medication 25 MILLIGRAM(S): at 05:22

## 2022-07-18 RX ADMIN — AMLODIPINE BESYLATE 5 MILLIGRAM(S): 2.5 TABLET ORAL at 05:25

## 2022-07-18 RX ADMIN — SERTRALINE 25 MILLIGRAM(S): 25 TABLET, FILM COATED ORAL at 11:25

## 2022-07-18 RX ADMIN — OXYCODONE AND ACETAMINOPHEN 1 TABLET(S): 5; 325 TABLET ORAL at 10:30

## 2022-07-18 RX ADMIN — OXYCODONE AND ACETAMINOPHEN 1 TABLET(S): 5; 325 TABLET ORAL at 10:02

## 2022-07-18 NOTE — PROGRESS NOTE ADULT - SUBJECTIVE AND OBJECTIVE BOX
24H events:    Patient is a 86y old Female who presents with a chief complaint of Right sided arm pain (2022 15:52)    Primary diagnosis of Musculoskeletal shoulder pain    Today is hospital day 1d. This morning patient was seen and examined at bedside, resting comfortably in bed.    No acute or major events overnight.    PAST MEDICAL & SURGICAL HISTORY  Chronic kidney disease    Pacemaker    Hypertension    Gout    Diverticulitis  sigmoid    Diastolic heart failure    Rheumatoid arthritis    DVT, lower extremity  Bilateral    Artificial pacemaker    History of appendectomy    History of tonsillectomy    History of hysterectomy    H/O hernia repair      SOCIAL HISTORY:  Social History:      ALLERGIES:  Keflex (Swelling)    MEDICATIONS:  STANDING MEDICATIONS  amLODIPine   Tablet 5 milliGRAM(s) Oral daily  aspirin  chewable 81 milliGRAM(s) Oral daily  atorvastatin 20 milliGRAM(s) Oral at bedtime  calcium acetate 1334 milliGRAM(s) Oral three times a day with meals  furosemide    Tablet 40 milliGRAM(s) Oral daily  isosorbide   mononitrate ER Tablet (IMDUR) 30 milliGRAM(s) Oral daily  latanoprost 0.005% Ophthalmic Solution 1 Drop(s) Both EYES at bedtime  metoprolol succinate ER 25 milliGRAM(s) Oral daily  sertraline 25 milliGRAM(s) Oral daily  timolol 0.5% Solution 1 Drop(s) Both EYES two times a day    PRN MEDICATIONS  oxycodone    5 mG/acetaminophen 325 mG 1 Tablet(s) Oral every 6 hours PRN    VITALS:   T(F): 97.9  HR: 67  BP: 117/56  RR: 18  SpO2: 99%    PHYSICAL EXAM:  GENERAL: NAD, well-groomed, well-developed  NERVOUS SYSTEM:  Alert & Oriented X3, non focal   CHEST/LUNG: Clear to auscultation bilaterally  HEART: Regular rate and rhythm  ABDOMEN: Soft, Nontender, Nondistended; Bowel sounds present  EXTREMITIES:  limited R shoulder ROM, point tenderness in subacromial joint, shooting/tinging pain in fingertips, Muscle strength 5/5, 2+ Peripheral Pulses    LABS:                        7.4    5.66  )-----------( 201      ( 2022 05:21 )             23.4     07-18    137  |  106  |  75<HH>  ----------------------------<  107<H>  4.6   |  19  |  3.2<H>    Ca    8.5      2022 05:21  Mg     1.8         TPro  5.6<L>  /  Alb  3.3<L>  /  TBili  <0.2  /  DBili  x   /  AST  42<H>  /  ALT  18  /  AlkPhos  101  -    PT/INR - ( 2022 05:21 )   PT: 13.60 sec;   INR: 1.18 ratio         PTT - ( 2022 05:21 )  PTT:33.4 sec  Urinalysis Basic - ( 2022 16:51 )    Color: Light Yellow / Appearance: Slightly Turbid / S.012 / pH: x  Gluc: x / Ketone: Negative  / Bili: Negative / Urobili: <2 mg/dL   Blood: x / Protein: 100 mg/dL / Nitrite: Negative   Leuk Esterase: Large / RBC: 12 /HPF / WBC 36 /HPF   Sq Epi: x / Non Sq Epi: 1 /HPF / Bacteria: Negative        Sedimentation Rate, Erythrocyte: 80 mm/Hr *H* (22 @ 05:21)  Creatine Kinase, Serum: 118 U/L (22 @ 17:36)  Troponin T, Serum: 0.01 ng/mL (22 @ 17:36)  Troponin T, Serum: 0.02 ng/mL *H* (22 @ 12:18)      CARDIAC MARKERS ( 2022 17:36 )  x     / 0.01 ng/mL / 118 U/L / x     / 2.5 ng/mL  CARDIAC MARKERS ( 2022 12:18 )  x     / 0.02 ng/mL / x     / x     / x          RADIOLOGY:          
numerical 0-10

## 2022-07-18 NOTE — CONSULT NOTE ADULT - ASSESSMENT
86 year old female ([employer]) with PMX HFrEF 25%, s/p AICD, pulm HTN, provoked PE/DVT on Eliquis, DLD, glaucoma and CKD 4 presents to ED for "right shoulder pain  86 year old female ([employer]ism) with PMX HFrEF 25%, s/p AICD, pulm HTN, provoked PE/DVT on Eliquis, DLD, glaucoma and CKD 4 presents to ED for "right shoulder pain    # Acute on chronic normocytic anemia: R/O GI pathology  - hemodynamically stable   - Baseline hemoglobin 9-10  - Hemoglobin on admission: 7.8 > 8.4 > 7.9  - DAPHNE: brown stool   - No evidence of active GI bleeding  - NO BLOOD TRANSFUSIONS AS PER PT WISHES, JEHOVA'S WITNESS  - EGD: 7/2020, non erosive gastritis, -ve H.pylori  - colonoscopy: good prep, 4-5 mm 2 polyps in ascending colon and cecum, pathology TA    Rec:  - advance diet as tolerated  - Maintain active Type and screen  - Trend H&H BID  - no contraindication from GI stand point for anticoagulation   - pt will need outpt follow up for capsule endoscopy     will sign off  recall GIL Woodruff  PGY 5  Gastroenterology Fellow

## 2022-07-18 NOTE — PROGRESS NOTE ADULT - ASSESSMENT
This is a 86 year old female (Muslim) with PMX HFrEF 25%, s/p AICD, pulm HTN, provoked PE/DVT on Eliquis, DLD, glaucoma and CKD 4 presents to ED for "right shoulder pain, radiating to elbow and neck" that got worse last night. Patient denies trauma, states that she has often had similar pain in the past, however, today it is more severe than it has even been. Patient says pain began Thursday night, has progressively worsened since then. Patient has difficulty with ROM. Patient denies any chest pain but has Right shoulder pain that's radiating to the right upper chest. Of note patient was recently seen in the ED for right shoulder pain. Patient denies headache, dizziness, fever, chills. Patient endorses intermittent SOB, denies chest pain, palpitation. Patient denies abdominal pain, n/v/d/c. Patient denies melena, hematochezia, hematemesis    ED course:   ED Vitals T(F): 97.5, HR: 70, BP: 133/65, RR: 18, SpO2: 100%  Labs significant for Hb 7.8  Shoulder X-ray: no evidence of fractures or dislocations. Previous X-ray significant for degenerative changes to glenohumeral and acromioclavicular joints    #Symptomatic Anemia  - Hb 7.4 (7/18) from 10.1 in 07/07/2022  - No signs of active bleeding   - Hemodynamically stable   - DAPHNE shows brown stool   - s/p EGD and colonoscopy in 2020 with non-specific gastritis and Diverticulosis - recommendation was to repeat colonoscopy in 1 year, which she did not   - NO BLOOD TRANSFUSIONS AS PER PT WISHES, JEHOVA'S WITNESS  - Monitor H+H  - Keep active T+S   - F/u with GI consult     #Right shoulder pain radiating to the neck and elbow   #musculoskeletal pain  - Unilateral Joint pain   - Previous shoulder xray shows degenerative changes of the glenohumeral and acromioclavicular joints noted  -7/17 X-ray r/o fractures and dislocations  - Previous labs are significant for elevated uric acid and inflammatory markers, negative cyclic citrullinated peptide   - Patient was evaluated by rheumatology in the past  - F/u repeat Inflammatory markers ESR, CRP, Uric acid level, RF, CCP, if elevated may benefit from short course of prednisone.   - c/w with pain control, muscle relaxants   - physiatry/PT consult     #Troponemia in the setting of CKD4   - Troponin .02  - F/u repeat trops     #CKD stage 4  - Cr 3.3 (around baseline)  - Follows Dr. Urrutia   - Avoid nephrotoxic agents     # ADHF / HFrEF, EF 25-30%  - TTE 3/2022: depressed LVEF 20-25%; moderate mitral and tricuspid regurgitation; enlarged LA  - c/w home dose of Imdur, Metoprolol, Lasix,  no ACEi/ARB/Entresto  - c/w Atorvastatin 20mg daily  - on aspirin, eliquis; no Plavix  - NO ACE inhibitors/ ARB/ Entresto/ spironolactone since patient can become severely hyperkalemic as per cardiology     #HTN  - c/w Amlodipine   - NO ACE inhibitors/ ARB/ Entresto/ spironolactone since patient can become severely hyperkalemic     #Hx of PE/DVT  - Hx of provoked PE in the past and soleal vein thrombosis years ago?  - On eliquis 2.5 mg bid for now at home   - On hold due to acute drop in Hb     #HDL  - c/w atorvastatin 20  - F/u lipid Panel     #Glaucoma  - c/w eye drops    #Misc   - DVT prophylaxis: on hold due to acute drop in Hb   - GI prophylaxis: PPI   - Diet: DASH   - Activity status: IAT   - Disposition: home if hgb stable

## 2022-07-18 NOTE — PATIENT PROFILE ADULT - FALL HARM RISK - HARM RISK INTERVENTIONS

## 2022-07-18 NOTE — CONSULT NOTE ADULT - ATTENDING COMMENTS
I edited the note.   As described above, patient with anemia.  See above for details  I have reviewed  the above note and agree with the impressions and findings and recommendations

## 2022-07-18 NOTE — PATIENT PROFILE ADULT - FUNCTIONAL ASSESSMENT - BASIC MOBILITY 6.
3-calculated by average/Not able to assess (calculate score using [facility] averaging method)

## 2022-07-18 NOTE — CONSULT NOTE ADULT - SUBJECTIVE AND OBJECTIVE BOX
Gastroenterology Consultation:    Patient is a 86y old  Female who presents with a chief complaint of Right sided arm pain (17 Jul 2022 15:52)        Admitted on: 07-17-22      HPI:  This is a 86 year old female (Denominational) with PMX HFrEF 25%, s/p AICD, pulm HTN, provoked PE/DVT on Eliquis, DLD, glaucoma and CKD 4 presents to ED for "right shoulder pain, radiating to elbow and neck" that got worse last night. Patient denies trauma, states that she has often had similar pain in the past, however, today it is more severe than it has even been. Patient says pain began Thursday night, has progressively worsened since then. Patient has difficulty with ROM. Patient denies any chest pain but has Right shoulder pain that's radiating to the right upper chest. Of note patient was recently seen in the ED for right shoulder pain. Patient denies headache, dizziness, fever, chills. Patient endorses intermittent SOB, denies chest pain, palpitation. Patient denies abdominal pain, n/v/d/c. Patient denies melena, hematochezia, hematemesis     ED Vitals T(F): 97.5, HR: 70, BP: 133/65, RR: 18, SpO2: 100%  Labs significant for Hb 7.8, HCT 24.8, WBC 6.50, , Trops .02  137  |  103  |  73<HH>  ----------------------------<  105<H>  4.5   |  21  |  3.3<H>  TPro  6.6  /  Alb  3.8  /  TBili  <0.2  /  DBili  x   /  AST  36  /  ALT  18  /  AlkPhos  113  07-17 (17 Jul 2022 15:52)        Prior EGD: 7/2020, non erosive gastritis, -ve H.pylori    Prior Colonoscopy: good prep, 4-5 mm 2 polyps in ascending colon and cecum, pathology TA      PAST MEDICAL & SURGICAL HISTORY:  Chronic kidney disease      Pacemaker      Hypertension      Gout      Diverticulitis  sigmoid      Diastolic heart failure      Rheumatoid arthritis      DVT, lower extremity  Bilateral      Artificial pacemaker      History of appendectomy      History of tonsillectomy      History of hysterectomy      H/O hernia repair            FAMILY HISTORY:  FH: arthritis  sister    Social History:  Tobacco: denies   Alcohol: denies   Drugs: denies     Home Medications:  acetaminophen 325 mg oral tablet: 2 tab(s) orally every 6 hours, As needed, Temp greater or equal to 38C (100.4F), Mild Pain (1 - 3) (22 May 2022 14:50)  amLODIPine 5 mg oral tablet: 1 tab(s) orally once a day (17 Jul 2022 16:43)  apixaban 2.5 mg oral tablet: 1 tab(s) orally 2 times a day (22 May 2022 14:50)  aspirin 81 mg oral tablet: 1 tab(s) orally once a day (17 Jul 2022 16:44)  calcium acetate 667 mg oral capsule: 2 cap(s) orally 3 times a day (with meals) (17 Jul 2022 16:41)  furosemide 40 mg oral tablet: 1 tab(s) orally once a day (17 Jul 2022 16:40)  isosorbide mononitrate 30 mg oral tablet, extended release: 1 tab(s) orally once a day (in the morning) (17 Jul 2022 16:39)  latanoprost 0.005% ophthalmic solution: 1 drop(s) to each affected eye 2 times a day (22 May 2022 14:50)  Lipitor 20 mg oral tablet: 1 tab(s) orally once a day (at bedtime) (22 May 2022 14:50)  Metoprolol Succinate ER 25 mg oral tablet, extended release: 1 tab(s) orally once a day (22 May 2022 14:50)  sertraline 25 mg oral tablet: 1 tab(s) orally once a day (17 Jul 2022 16:43)  Timolol Maleate (Eqv-Timoptic) 0.5% ophthalmic solution: 1 drop(s) to each affected eye 2 times a day (22 May 2022 14:50)        MEDICATIONS  (STANDING):  amLODIPine   Tablet 5 milliGRAM(s) Oral daily  aspirin  chewable 81 milliGRAM(s) Oral daily  atorvastatin 20 milliGRAM(s) Oral at bedtime  calcium acetate 1334 milliGRAM(s) Oral three times a day with meals  furosemide    Tablet 40 milliGRAM(s) Oral daily  isosorbide   mononitrate ER Tablet (IMDUR) 30 milliGRAM(s) Oral daily  latanoprost 0.005% Ophthalmic Solution 1 Drop(s) Both EYES at bedtime  metoprolol succinate ER 25 milliGRAM(s) Oral daily  sertraline 25 milliGRAM(s) Oral daily  timolol 0.5% Solution 1 Drop(s) Both EYES two times a day    MEDICATIONS  (PRN):  oxycodone    5 mG/acetaminophen 325 mG 1 Tablet(s) Oral every 6 hours PRN Moderate Pain (4 - 6)      Allergies  Keflex (Swelling)      Review of Systems:   Constitutional:  No Fever, No Chills  ENT/Mouth:  No Hearing Changes,  No Difficulty Swallowing  Eyes:  No Eye Pain, No Vision Changes  Cardiovascular:  No Chest Pain, No Palpitations  Respiratory:  No Cough, No Dyspnea  Gastrointestinal:  As described in HPI  Musculoskeletal:  No Joint Swelling, No Back Pain  Skin:  No Skin Lesions, No Jaundice  Neuro:  No Syncope, No Dizziness  Heme/Lymph:  No Bruising, No Bleeding.          Physical Examination:  T(C): 36.6 (07-18-22 @ 04:55), Max: 36.7 (07-17-22 @ 10:50)  HR: 67 (07-18-22 @ 04:55) (60 - 70)  BP: 117/56 (07-18-22 @ 04:55) (117/56 - 147/70)  RR: 18 (07-18-22 @ 04:55) (18 - 20)  SpO2: 99% (07-17-22 @ 23:51) (99% - 100%)  Height (cm): 160 (07-17-22 @ 10:50)  Weight (kg): 59.2 (07-18-22 @ 01:00)        GENERAL: AAOx3, no acute distress.  HEAD:  Atraumatic, Normocephalic  EYES: conjunctiva and sclera clear  NECK: Supple, no JVD or thyromegaly  CHEST/LUNG: Clear to auscultation bilaterally; No wheeze, rhonchi, or rales  HEART: Regular rate and rhythm; normal S1, S2, No murmurs.  ABDOMEN: Soft, nontender, nondistended; Bowel sounds present  NEUROLOGY: No asterixis or tremor.   SKIN: Intact, no jaundice  DAPHNE: brown stool        Data:                        7.4    5.66  )-----------( 201      ( 18 Jul 2022 05:21 )             23.4     Hgb Trend:  7.4  07-18-22 @ 05:21  8.9  07-17-22 @ 20:38  7.8  07-17-22 @ 12:18      07-18    137  |  106  |  75<HH>  ----------------------------<  107<H>  4.6   |  19  |  3.2<H>    Ca    8.5      18 Jul 2022 05:21  Mg     1.8     07-18    TPro  5.6<L>  /  Alb  3.3<L>  /  TBili  <0.2  /  DBili  x   /  AST  42<H>  /  ALT  18  /  AlkPhos  101  07-18    Liver panel trend:  TBili <0.2   /   AST 42   /   ALT 18   /   AlkP 101   /   Tptn 5.6   /   Alb 3.3    /   DBili --      07-18  TBili <0.2   /   AST 36   /   ALT 18   /   AlkP 113   /   Tptn 6.6   /   Alb 3.8    /   DBili --      07-17      PT/INR - ( 18 Jul 2022 05:21 )   PT: 13.60 sec;   INR: 1.18 ratio         PTT - ( 18 Jul 2022 05:21 )  PTT:33.4 sec        Radiology:

## 2022-07-19 ENCOUNTER — TRANSCRIPTION ENCOUNTER (OUTPATIENT)
Age: 87
End: 2022-07-19

## 2022-07-19 LAB
ALBUMIN SERPL ELPH-MCNC: 3.7 G/DL — SIGNIFICANT CHANGE UP (ref 3.5–5.2)
ALP SERPL-CCNC: 119 U/L — HIGH (ref 30–115)
ALT FLD-CCNC: 22 U/L — SIGNIFICANT CHANGE UP (ref 0–41)
ANION GAP SERPL CALC-SCNC: 14 MMOL/L — SIGNIFICANT CHANGE UP (ref 7–14)
AST SERPL-CCNC: 38 U/L — SIGNIFICANT CHANGE UP (ref 0–41)
BASOPHILS # BLD AUTO: 0.04 K/UL — SIGNIFICANT CHANGE UP (ref 0–0.2)
BASOPHILS NFR BLD AUTO: 0.7 % — SIGNIFICANT CHANGE UP (ref 0–1)
BILIRUB SERPL-MCNC: <0.2 MG/DL — SIGNIFICANT CHANGE UP (ref 0.2–1.2)
BUN SERPL-MCNC: 66 MG/DL — CRITICAL HIGH (ref 10–20)
CALCIUM SERPL-MCNC: 9.1 MG/DL — SIGNIFICANT CHANGE UP (ref 8.5–10.1)
CCP IGG SERPL-ACNC: <8 UNITS — SIGNIFICANT CHANGE UP
CHLORIDE SERPL-SCNC: 104 MMOL/L — SIGNIFICANT CHANGE UP (ref 98–110)
CO2 SERPL-SCNC: 19 MMOL/L — SIGNIFICANT CHANGE UP (ref 17–32)
CREAT SERPL-MCNC: 2.9 MG/DL — HIGH (ref 0.7–1.5)
EGFR: 15 ML/MIN/1.73M2 — LOW
EOSINOPHIL # BLD AUTO: 0.39 K/UL — SIGNIFICANT CHANGE UP (ref 0–0.7)
EOSINOPHIL NFR BLD AUTO: 7 % — SIGNIFICANT CHANGE UP (ref 0–8)
GLUCOSE SERPL-MCNC: 134 MG/DL — HIGH (ref 70–99)
HCT VFR BLD CALC: 28.3 % — LOW (ref 37–47)
HGB BLD-MCNC: 8.8 G/DL — LOW (ref 12–16)
IMM GRANULOCYTES NFR BLD AUTO: 0.2 % — SIGNIFICANT CHANGE UP (ref 0.1–0.3)
LYMPHOCYTES # BLD AUTO: 1.34 K/UL — SIGNIFICANT CHANGE UP (ref 1.2–3.4)
LYMPHOCYTES # BLD AUTO: 24.1 % — SIGNIFICANT CHANGE UP (ref 20.5–51.1)
MAGNESIUM SERPL-MCNC: 1.9 MG/DL — SIGNIFICANT CHANGE UP (ref 1.8–2.4)
MCHC RBC-ENTMCNC: 29 PG — SIGNIFICANT CHANGE UP (ref 27–31)
MCHC RBC-ENTMCNC: 31.1 G/DL — LOW (ref 32–37)
MCV RBC AUTO: 93.4 FL — SIGNIFICANT CHANGE UP (ref 81–99)
MONOCYTES # BLD AUTO: 0.61 K/UL — HIGH (ref 0.1–0.6)
MONOCYTES NFR BLD AUTO: 11 % — HIGH (ref 1.7–9.3)
NEUTROPHILS # BLD AUTO: 3.18 K/UL — SIGNIFICANT CHANGE UP (ref 1.4–6.5)
NEUTROPHILS NFR BLD AUTO: 57 % — SIGNIFICANT CHANGE UP (ref 42.2–75.2)
NRBC # BLD: 0 /100 WBCS — SIGNIFICANT CHANGE UP (ref 0–0)
PLATELET # BLD AUTO: 226 K/UL — SIGNIFICANT CHANGE UP (ref 130–400)
POTASSIUM SERPL-MCNC: 5.3 MMOL/L — HIGH (ref 3.5–5)
POTASSIUM SERPL-SCNC: 5.3 MMOL/L — HIGH (ref 3.5–5)
PROT SERPL-MCNC: 6.7 G/DL — SIGNIFICANT CHANGE UP (ref 6–8)
RBC # BLD: 3.03 M/UL — LOW (ref 4.2–5.4)
RBC # FLD: 15.9 % — HIGH (ref 11.5–14.5)
RF+CCP IGG SER-IMP: NEGATIVE — SIGNIFICANT CHANGE UP
SODIUM SERPL-SCNC: 137 MMOL/L — SIGNIFICANT CHANGE UP (ref 135–146)
WBC # BLD: 5.57 K/UL — SIGNIFICANT CHANGE UP (ref 4.8–10.8)
WBC # FLD AUTO: 5.57 K/UL — SIGNIFICANT CHANGE UP (ref 4.8–10.8)

## 2022-07-19 PROCEDURE — 99238 HOSP IP/OBS DSCHRG MGMT 30/<: CPT

## 2022-07-19 RX ADMIN — Medication 1 DROP(S): at 06:33

## 2022-07-19 RX ADMIN — AMLODIPINE BESYLATE 5 MILLIGRAM(S): 2.5 TABLET ORAL at 06:33

## 2022-07-19 RX ADMIN — SERTRALINE 25 MILLIGRAM(S): 25 TABLET, FILM COATED ORAL at 11:07

## 2022-07-19 RX ADMIN — Medication 81 MILLIGRAM(S): at 11:07

## 2022-07-19 RX ADMIN — Medication 1 DROP(S): at 18:03

## 2022-07-19 RX ADMIN — Medication 1334 MILLIGRAM(S): at 11:07

## 2022-07-19 RX ADMIN — Medication 25 MILLIGRAM(S): at 06:33

## 2022-07-19 RX ADMIN — Medication 40 MILLIGRAM(S): at 06:33

## 2022-07-19 RX ADMIN — ISOSORBIDE MONONITRATE 30 MILLIGRAM(S): 60 TABLET, EXTENDED RELEASE ORAL at 11:07

## 2022-07-19 RX ADMIN — Medication 1334 MILLIGRAM(S): at 10:15

## 2022-07-19 NOTE — DISCHARGE NOTE NURSING/CASE MANAGEMENT/SOCIAL WORK - PATIENT PORTAL LINK FT
You can access the FollowMyHealth Patient Portal offered by [facility] by registering at the following website: [email or web address]. By joining [employer]’s FollowMyHealth portal, you will also be able to view your health information using other applications (apps) compatible with our system.

## 2022-07-19 NOTE — DISCHARGE NOTE NURSING/CASE MANAGEMENT/SOCIAL WORK - NSDCPEFALRISK_GEN_ALL_CORE
For information on Fall & Injury Prevention, visit: [email or web address] OR  [email or web address] OR  https://www.cdc.gov/steadi/patient.html

## 2022-07-19 NOTE — DISCHARGE NOTE PROVIDER - CARE PROVIDER_API CALL
Wade Celeste)  Internal Medicine  [address] [place] [address]  Phone: [phone]  Fax: [phone]  Follow Up Time: 1 month   Wade Celeste)  Internal Medicine  [address] [place] [address]  Phone: [phone]  Fax: [phone]  Follow Up Time: 1 month    Gerard Hussein)  Gastroenterology; Internal Medicine  [address] [place] [address]  Phone: [phone]  Fax: [phone]  Follow Up Time: 2 weeks    Sukhjinder Urrutia)  Nephrology  [address] [place] [address], [address]  [place] [address]  Phone: [phone]  Fax: [phone]  Follow Up Time: 2 weeks    Sterling Mckinney)  Cardiology; Cardiovascular Disease; Internal Medicine  [address] [place] [address]  Phone: [phone]  Fax: [phone]  Follow Up Time: 2 weeks

## 2022-07-19 NOTE — DISCHARGE NOTE PROVIDER - NSDCMRMEDTOKEN_GEN_ALL_CORE_FT
acetaminophen 325 mg oral tablet: 2 tab(s) orally every 6 hours, As needed, Temp greater or equal to 38C (100.4F), Mild Pain (1 - 3)  amLODIPine 5 mg oral tablet: 1 tab(s) orally once a day  apixaban 2.5 mg oral tablet: 1 tab(s) orally 2 times a day  aspirin 81 mg oral tablet: 1 tab(s) orally once a day  calcium acetate 667 mg oral capsule: 2 cap(s) orally 3 times a day (with meals)  furosemide 40 mg oral tablet: 1 tab(s) orally once a day  isosorbide mononitrate 30 mg oral tablet, extended release: 1 tab(s) orally once a day (in the morning)  latanoprost 0.005% ophthalmic solution: 1 drop(s) to each affected eye 2 times a day  Lipitor 20 mg oral tablet: 1 tab(s) orally once a day (at bedtime)  Metoprolol Succinate ER 25 mg oral tablet, extended release: 1 tab(s) orally once a day  sertraline 25 mg oral tablet: 1 tab(s) orally once a day  Timolol Maleate (Eqv-Timoptic) 0.5% ophthalmic solution: 1 drop(s) to each affected eye 2 times a day

## 2022-07-19 NOTE — DISCHARGE NOTE PROVIDER - CARE PROVIDERS DIRECT ADDRESSES
,DirectAddress_Unknown ,DirectAddress_Unknown,[email or web address],DirectAddress_Unknown,[email or web address]

## 2022-07-19 NOTE — DISCHARGE NOTE PROVIDER - NSDCFUSCHEDAPPT_GEN_ALL_CORE_FT
Sterling Mckinney  [facility]  CARDIOLOGY [address]  Scheduled Appointment: 08/25/2022    Sarah Tierney  [facility]  GASTRO Doc Off [id number]  Scheduled Appointment: 08/30/2022

## 2022-07-19 NOTE — DISCHARGE NOTE PROVIDER - HOSPITAL COURSE
This is a 86 year old female (Christian) with PMX HFrEF 25%, s/p AICD, pulm HTN, provoked PE/DVT on Eliquis, DLD, glaucoma and CKD 4 presents to ED for "right shoulder pain, radiating to elbow and neck" that got worse last night. Patient denies trauma, states that she has often had similar pain in the past, however, today it is more severe than it has even been. Patient says pain began Thursday night, has progressively worsened since then. Patient has difficulty with ROM. Patient denies any chest pain but has Right shoulder pain that's radiating to the right upper chest. Of note patient was recently seen in the ED for right shoulder pain. Patient denies headache, dizziness, fever, chills. Patient endorses intermittent SOB, denies chest pain, palpitation. Patient denies abdominal pain, n/v/d/c. Patient denies melena, hematochezia, hematemesis     #Symptomatic Anemia  - Hb 7.8 from 10.1 in 07/07/2022  - No signs of active bleeding   - Hemodynamically stable   - DAPHNE shows brown stool   - s/p EGD and colonoscopy in 2020 with non-specific gastritis and Diverticulosis - recommendation was to repeat colonoscopy in 1 year, which she did not   - NO BLOOD TRANSFUSIONS AS PER PT WISHES, JEHOVA'S WITNESS  - Monitor H+H  - Keep active T+S   - s/p GI eval: Outpatient video capsule endoscopy     #Right shoulder pain radiating to the neck and elbow   - Unilateral Joint pain   - Previous shoulder xray shows degenerative changes of the glenohumeral and acromioclavicular joints noted.  - Previous labs are significant for elevated uric acid and inflammatory markers, negative cyclic citrullinated peptide   - Patient was evaluated by rheumatology in the past  - c/w with pain control, muscle relaxants   - physiatry/PT consult     #Troponemia in the setting of CKD4   - Troponin .02, stable    #CKD stage 4  - Cr 3.3 (around baseline)  - Follows Dr. Urrutia   - Avoid nephrotoxic agents     # ADHF / HFrEF, EF 25-30%  - TTE 3/2022: depressed LVEF 20-25%; moderate mitral and tricuspid regurgitation; enlarged LA  - c/w home dose of Imdur, Metoprolol, Lasix,  no ACEi/ARB/Entresto  - c/w Atorvastatin 20mg daily  - on aspirin, eliquis; no Plavix  - NO ACE inhibitors/ ARB/ Entresto/ spironolactone since patient can become severely hyperkalemic as per cardiology     #HTN  - c/w Amlodipine   - NO ACE inhibitors/ ARB/ Entresto/ spironolactone since patient can become severely hyperkalemic     #Hx of PE/DVT  - Hx of provoked PE in the past and soleal vein thrombosis years ago?  - On eliquis 2.5 mg bid for now at home   - On hold due to acute drop in Hb   - Hb stable, will continue home eliquis     #HDL  - c/w atorvastatin 20    #Glaucoma  - c/w eye drops This is a 86 year old female ([employer]ism) with PMX HFrEF 25%, s/p AICD, pulm HTN, provoked PE/DVT on Eliquis, DLD, glaucoma and CKD 4 presents to ED for "right shoulder pain, radiating to elbow and neck" that got worse last night. Patient denies trauma, states that she has often had similar pain in the past, however, today it is more severe than it has even been. Patient says pain began Thursday night, has progressively worsened since then. Patient has difficulty with ROM. Patient denies any chest pain but has Right shoulder pain that's radiating to the right upper chest. Of note patient was recently seen in the ED for right shoulder pain. Patient denies headache, dizziness, fever, chills. Patient endorses intermittent SOB, denies chest pain, palpitation. Patient denies abdominal pain, n/v/d/c. Patient denies melena, hematochezia, hematemesis     #Symptomatic Anemia- improved; hg stable  no active signs of bleeding  - Hemodynamically stable   - DAPHNE shows brown stool   - s/p EGD and colonoscopy in 2020 with non-specific gastritis and Diverticulosis - recommendation was to repeat colonoscopy in 1 year, which she did not   - NO BLOOD TRANSFUSIONS AS PER PT WISHES, JEHOVA'S WITNESS  - s/p GI eval: Outpatient video capsule endoscopy   - high ferritin, folate and B12 level    #Right shoulder pain radiating to the neck and elbow - musculoskeletal  - Unilateral Joint pain   - Previous shoulder xray shows degenerative changes of the glenohumeral and acromioclavicular joints noted.  - Previous labs are significant for elevated uric acid and inflammatory markers, negative cyclic citrullinated peptide   - Patient was evaluated by rheumatology in the past  - c/w with pain control PRN, warm compress    #Troponemia in the setting of CKD4   - Troponin .02, stable    #CKD stage 4  - Cr 3.3 (around baseline)  - Follows Dr. Urrutia   - Avoid nephrotoxic agents     # ADHF / HFrEF, EF 25-30%  - TTE 3/2022: depressed LVEF 20-25%; moderate mitral and tricuspid regurgitation; enlarged LA  - c/w home dose of Imdur, Metoprolol, Lasix,  no ACEi/ARB/Entresto  - c/w Atorvastatin 20mg daily  - on aspirin, eliquis; no Plavix  - NO ACE inhibitors/ ARB/ Entresto/ spironolactone since patient can become severely hyperkalemic as per cardiology     #HTN  - c/w Amlodipine   - NO ACE inhibitors/ ARB/ Entresto/ spironolactone since patient can become severely hyperkalemic     #Hx of PE/DVT  - per patient episode DVT 2 years before. per communication with patient's cardiologist Dr Mckinney patient had 2 episodes of VTE- provoked and once unprovoked  - On eliquis 2.5 mg bid for now at home - continue  - Hb stable, will continue home eliquis     #HDL  - c/w atorvastatin 20    #Glaucoma  - c/w eye drops

## 2022-07-19 NOTE — DISCHARGE NOTE PROVIDER - ATTENDING DISCHARGE PHYSICAL EXAMINATION:
PHYSICAL EXAM  GEN: no distress, comfortable  PULM: BS heard b/l equal, No wheezing  CVS: S1S2 present, no rubs or gallops  ABD: Soft, non-distended, no guarding; non-tender  EXT: No lower extremity edema  NEURO: A&Ox3

## 2022-07-19 NOTE — DISCHARGE NOTE PROVIDER - NSDCCPCAREPLAN_GEN_ALL_CORE_FT
PRINCIPAL DISCHARGE DIAGNOSIS  Diagnosis: Anemia  Assessment and Plan of Treatment: You presented with symptomatic anemia and were found to have a hemoglobin of 7.8. There was concern for a gastrointestinal bleed so our GI team evaluated you and scheduled you for an outpatient video capsule endoscopy to take a look at the intestines and look for any source of bleding. Your Eliquis was held and you hemoglobin remained stable, so you can continue taking your home dose of 2.5mg twice/day.      SECONDARY DISCHARGE DIAGNOSES  Diagnosis: Right shoulder pain  Assessment and Plan of Treatment: You presented with right shoulder pain with no inflammatory or infectious etiology. An X ray was done that ruled out a fracture or a bone disease process. You did hysical therapy and had warm compresses to your shoulder with improvement in the pain.    Diagnosis: Hemoglobin low  Assessment and Plan of Treatment:     Diagnosis: Acute UTI  Assessment and Plan of Treatment:      PRINCIPAL DISCHARGE DIAGNOSIS  Diagnosis: Anemia  Assessment and Plan of Treatment: You presented with symptomatic anemia and were found to have a hemoglobin of 7.8. Your blood counts were monitored and it was stable and on discharge levels was 8.8.   There was concern for a gastrointestinal bleed so our GI team evaluated you and scheduled you for an outpatient video capsule endoscopy to take a look at the intestines and look for any source of bleding. Your Eliquis was held and you hemoglobin remained stable, so you can continue taking your home dose of 2.5mg twice/day.  If you noticing any blood in stool on black colred stool then you need to seek medical care      SECONDARY DISCHARGE DIAGNOSES  Diagnosis: Right shoulder pain  Assessment and Plan of Treatment: You presented with right shoulder pain with no inflammatory or infectious etiology. An X ray was done that ruled out a fracture or a bone disease process. You did hysical therapy and had warm compresses to your shoulder with improvement in the pain.    Diagnosis: Hemoglobin low  Assessment and Plan of Treatment:     Diagnosis: Acute UTI  Assessment and Plan of Treatment:

## 2022-07-19 NOTE — DISCHARGE NOTE PROVIDER - PROVIDER TOKENS
PROVIDER:[TOKEN:[[id number]],FOLLOWUP:[1 month]] PROVIDER:[TOKEN:[[id number]],FOLLOWUP:[1 month]],PROVIDER:[TOKEN:[[id number]],FOLLOWUP:[2 weeks]],PROVIDER:[TOKEN:[[id number]],FOLLOWUP:[2 weeks]],PROVIDER:[TOKEN:[[id number]],FOLLOWUP:[2 weeks]]

## 2022-07-20 VITALS
HEART RATE: 75 BPM | TEMPERATURE: 96 F | SYSTOLIC BLOOD PRESSURE: 132 MMHG | DIASTOLIC BLOOD PRESSURE: 62 MMHG | RESPIRATION RATE: 18 BRPM

## 2022-07-22 NOTE — ED PROVIDER NOTE - INTERNATIONAL TRAVEL
[No

## 2022-07-24 DIAGNOSIS — H40.9 UNSPECIFIED GLAUCOMA: ICD-10-CM

## 2022-07-24 DIAGNOSIS — I50.22 CHRONIC SYSTOLIC (CONGESTIVE) HEART FAILURE: ICD-10-CM

## 2022-07-24 DIAGNOSIS — E78.5 HYPERLIPIDEMIA, UNSPECIFIED: ICD-10-CM

## 2022-07-24 DIAGNOSIS — R77.8 OTHER SPECIFIED ABNORMALITIES OF PLASMA PROTEINS: ICD-10-CM

## 2022-07-24 DIAGNOSIS — D50.0 IRON DEFICIENCY ANEMIA SECONDARY TO BLOOD LOSS (CHRONIC): ICD-10-CM

## 2022-07-24 DIAGNOSIS — Z95.810 PRESENCE OF AUTOMATIC (IMPLANTABLE) CARDIAC DEFIBRILLATOR: ICD-10-CM

## 2022-07-24 DIAGNOSIS — K57.30 DIVERTICULOSIS OF LARGE INTESTINE WITHOUT PERFORATION OR ABSCESS WITHOUT BLEEDING: ICD-10-CM

## 2022-07-24 DIAGNOSIS — Z53.1 PROCEDURE AND TREATMENT NOT CARRIED OUT BECAUSE OF PATIENT'S DECISION FOR REASONS OF BELIEF AND GROUP PRESSURE: ICD-10-CM

## 2022-07-24 DIAGNOSIS — M06.9 RHEUMATOID ARTHRITIS, UNSPECIFIED: ICD-10-CM

## 2022-07-24 DIAGNOSIS — I27.20 PULMONARY HYPERTENSION, UNSPECIFIED: ICD-10-CM

## 2022-07-24 DIAGNOSIS — N18.4 CHRONIC KIDNEY DISEASE, STAGE 4 (SEVERE): ICD-10-CM

## 2022-07-24 DIAGNOSIS — I13.0 HYPERTENSIVE HEART AND CHRONIC KIDNEY DISEASE WITH HEART FAILURE AND STAGE 1 THROUGH STAGE 4 CHRONIC KIDNEY DISEASE, OR UNSPECIFIED CHRONIC KIDNEY DISEASE: ICD-10-CM

## 2022-07-24 DIAGNOSIS — Z79.01 LONG TERM (CURRENT) USE OF ANTICOAGULANTS: ICD-10-CM

## 2022-07-24 DIAGNOSIS — M10.9 GOUT, UNSPECIFIED: ICD-10-CM

## 2022-07-24 DIAGNOSIS — R07.9 CHEST PAIN, UNSPECIFIED: ICD-10-CM

## 2022-07-24 DIAGNOSIS — M25.511 PAIN IN RIGHT SHOULDER: ICD-10-CM

## 2022-07-24 DIAGNOSIS — I08.1 RHEUMATIC DISORDERS OF BOTH MITRAL AND TRICUSPID VALVES: ICD-10-CM

## 2022-07-24 DIAGNOSIS — N39.0 URINARY TRACT INFECTION, SITE NOT SPECIFIED: ICD-10-CM

## 2022-07-24 DIAGNOSIS — Z86.711 PERSONAL HISTORY OF PULMONARY EMBOLISM: ICD-10-CM

## 2022-07-25 NOTE — PHYSICAL THERAPY INITIAL EVALUATION ADULT - REFERRING PHYSICIAN, REHAB EVAL
No new care gaps identified.  [facility] Embedded Care Gaps. Reference number: [id number]. 7/23/2022   5:30:52 AM CDT  
Refill Decision Note   Samy Alejandre  is requesting a refill authorization.  Brief Assessment and Rationale for Refill:  Approve    -Medication-Related Problems Identified: Therapeutic duplication  Medication Therapy Plan:       Medication Reconciliation Completed: No   Comments:     No Care Gaps recommended.     Note composed:7:02 PM 07/24/2022            
Chan Aj

## 2022-08-22 RX ORDER — HYDRALAZINE HYDROCHLORIDE 25 MG/1
25 TABLET ORAL 3 TIMES DAILY
Qty: 270 | Refills: 0 | Status: DISCONTINUED | COMMUNITY
Start: 2021-12-29 | End: 2022-08-22

## 2022-08-22 RX ORDER — POLYETHYLENE GLYCOL 3350 AND ELECTROLYTES WITH LEMON FLAVOR 236; 22.74; 6.74; 5.86; 2.97 G/4L; G/4L; G/4L; G/4L; G/4L
236 POWDER, FOR SOLUTION ORAL
Qty: 1 | Refills: 0 | Status: DISCONTINUED | COMMUNITY
Start: 2020-07-16 | End: 2022-08-22

## 2022-08-25 ENCOUNTER — APPOINTMENT (OUTPATIENT)
Dept: CARDIOLOGY | Facility: CLINIC | Age: 87
End: 2022-08-25

## 2022-08-25 VITALS — HEART RATE: 59 BPM | TEMPERATURE: 97.6 F | SYSTOLIC BLOOD PRESSURE: 144 MMHG | DIASTOLIC BLOOD PRESSURE: 70 MMHG

## 2022-08-25 VITALS — BODY MASS INDEX: 22.15 KG/M2 | HEIGHT: 63 IN | WEIGHT: 125 LBS

## 2022-08-25 PROCEDURE — 99214 OFFICE O/P EST MOD 30 MIN: CPT

## 2022-08-25 PROCEDURE — 93000 ELECTROCARDIOGRAM COMPLETE: CPT

## 2022-08-25 RX ORDER — METOPROLOL SUCCINATE 50 MG/1
50 TABLET, EXTENDED RELEASE ORAL
Refills: 0 | Status: ACTIVE | COMMUNITY

## 2022-08-25 NOTE — ASSESSMENT
[FreeTextEntry1] : The patient has had a nonischemic CM . She has had multiple caths in the past . The patient has had a DVT and has had a PE in the remote past . The patient has been on eliquis . The patient has anemia She has had a panendoscopy last year which showed some erosive gastritis and internal hemorrhoids . The patient has has CKD  which is contributing to the anemia . Uncertain if she had been evaluated by hematology for hypercoagulable state . She has yet to see them . The patient has decreased LV systolic function . In the past the patient had CRT-d . According to his last note the patient just has a dual chamber ICD . This may account for the worsening LV systolic function.\par The patienthas CKD and has been on Hydrazine and nitrates . In the past she was suspected to have had a lupus like reaction to hydralazine . At this time she is euvolemic with edema and she had clear lungs . She has anemia which is problematic as she cannot hava a treansfusion . She is on eliquis for DVT and hx of VTE/PE in the past . She mayalso beneift from CRT as well. Her EF has decreased .

## 2022-08-25 NOTE — CARDIOLOGY SUMMARY
[___] : [unfilled] [LVEF ___%] : LVEF [unfilled]% [de-identified] : 12- Atrial paced rhyth IVCD LBBB morphology \par 3- NSR LBBB \par 8- Atrial paced  LBBB  [de-identified] : 3-7-2022 EF 25-30% moderate MR Mod TR

## 2022-08-25 NOTE — HISTORY OF PRESENT ILLNESS
[FreeTextEntry1] : The patient was admitted to [facility] for atypical CP and HF . She had her medication adjusted . She has been tolerating hydralazine despite there being issues in the past she has tolerated this . She has had anemia which is difficult to treat as she is a [employer] and will not accept a blood transfusion and she is on Eliquis

## 2022-08-30 ENCOUNTER — APPOINTMENT (OUTPATIENT)
Dept: GASTROENTEROLOGY | Facility: CLINIC | Age: 87
End: 2022-08-30

## 2022-08-30 DIAGNOSIS — R10.84 GENERALIZED ABDOMINAL PAIN: ICD-10-CM

## 2022-08-30 DIAGNOSIS — Z78.9 OTHER SPECIFIED HEALTH STATUS: ICD-10-CM

## 2022-08-30 PROCEDURE — 99204 OFFICE O/P NEW MOD 45 MIN: CPT | Mod: 95

## 2022-08-30 RX ORDER — LIDOCAINE 5% 700 MG/1
5 PATCH TOPICAL
Refills: 0 | Status: ACTIVE | COMMUNITY

## 2022-08-30 RX ORDER — PANTOPRAZOLE 40 MG/1
40 TABLET, DELAYED RELEASE ORAL
Qty: 30 | Refills: 0 | Status: DISCONTINUED | COMMUNITY
Start: 2018-12-18 | End: 2022-08-30

## 2022-08-30 RX ORDER — ALBUTEROL SULFATE 90 UG/1
108 (90 BASE) AEROSOL, METERED RESPIRATORY (INHALATION)
Qty: 8 | Refills: 0 | Status: DISCONTINUED | COMMUNITY
Start: 2019-04-09 | End: 2022-08-30

## 2022-08-30 RX ORDER — ACETAMINOPHEN 325 MG/1
325 TABLET ORAL
Refills: 0 | Status: DISCONTINUED | COMMUNITY
End: 2022-08-30

## 2022-08-30 RX ORDER — FOLIC ACID 1 MG/1
1 TABLET ORAL
Qty: 30 | Refills: 0 | Status: DISCONTINUED | COMMUNITY
Start: 2019-04-09 | End: 2022-08-30

## 2022-08-30 RX ORDER — ATORVASTATIN CALCIUM 20 MG/1
20 TABLET, FILM COATED ORAL
Refills: 0 | Status: DISCONTINUED | COMMUNITY
End: 2022-08-30

## 2022-08-30 NOTE — HISTORY OF PRESENT ILLNESS
[Home] : at home, [unfilled] , at the time of the visit. [Medical Office: ([facility])___] : at the medical office located in  [FreeTextEntry3] : Leona- Daughter  [FreeTextEntry4] : Sandee Moraes  [de-identified] : 86 year old female patient non smoker, average risk for CRC, diverticulosis, colon polyps (3 TAs), CHF s/p AICD on eliquis, HTN, stage 3 CKD comes in to follow up after hospitalization for CHF exacerbation. Then she was complaining of colicky abdominal pain, no blood in stools, or change in bowel habits, but she reports loosing weight.  \par Pt hs hx of blood loss anemia/ BREANNA  and was found to have no site of GI bleeding after an EGD, colonoscopy, and a capsule endoscopy in 2020.\par Denies any gross GI bleeding, or UGI sx.

## 2022-08-30 NOTE — PHYSICAL EXAM
[General Appearance - Alert] : alert [Hearing Threshold Finger Rub Not Bradley] : hearing was normal [] : no respiratory distress [Skin Color & Pigmentation] : normal skin color and pigmentation [Oriented To Time, Place, And Person] : oriented to person, place, and time

## 2022-08-30 NOTE — ASSESSMENT
[FreeTextEntry1] : 86 year old female patient non smoker, average risk for CRC, diverticulosis, colon polyps (3 TAs), CHF s/p AICD on eliquis, HTN, stage 3 CKD comes in to follow up after hospitalization for CHF exacerbation. Then she was complaining of colicky abdominal pain, no blood in stools, or change in bowel habits, but she reports loosing weight.  \par Pt hs hx of blood loss anemia/ BREANNA  and was found to have no site of GI bleeding after an EGD, colonoscopy, and a capsule endoscopy in 2020.\par Denies any gross GI bleeding, or UGI sx. 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

## 2022-08-31 NOTE — ED PROVIDER NOTE - NSTIMEPROVIDERCAREINITIATE_GEN_ER
Health Maintenance Due   Topic Date Due   • Hepatitis B Vaccine (1 of 3 - 3-dose series) Never done   • COVID-19 Vaccine (1) Never done   • Depression Screening  Never done       Patient is due for topics as listed above but is not proceeding with Immunization(s) COVID-19 and Hep B at this time. Education provided for Immunization(s) COVID-19 and Hep B.     14-Dec-2020 17:04

## 2022-09-10 ENCOUNTER — INPATIENT (INPATIENT)
Facility: HOSPITAL | Age: 87
LOS: 4 days | Discharge: HOME HEALTH | End: 2022-09-15
Attending: INTERNAL MEDICINE | Admitting: INTERNAL MEDICINE
Payer: MEDICARE

## 2022-09-10 VITALS
WEIGHT: 125 LBS | DIASTOLIC BLOOD PRESSURE: 68 MMHG | SYSTOLIC BLOOD PRESSURE: 135 MMHG | RESPIRATION RATE: 18 BRPM | OXYGEN SATURATION: 100 % | HEART RATE: 66 BPM | HEIGHT: 63 IN | TEMPERATURE: 98 F

## 2022-09-10 DIAGNOSIS — Z90.89 ACQUIRED ABSENCE OF OTHER ORGANS: Chronic | ICD-10-CM

## 2022-09-10 DIAGNOSIS — Z90.710 ACQUIRED ABSENCE OF BOTH CERVIX AND UTERUS: Chronic | ICD-10-CM

## 2022-09-10 DIAGNOSIS — Z90.49 ACQUIRED ABSENCE OF OTHER SPECIFIED PARTS OF DIGESTIVE TRACT: Chronic | ICD-10-CM

## 2022-09-10 DIAGNOSIS — Z98.890 OTHER SPECIFIED POSTPROCEDURAL STATES: Chronic | ICD-10-CM

## 2022-09-10 DIAGNOSIS — Z95.0 PRESENCE OF CARDIAC PACEMAKER: Chronic | ICD-10-CM

## 2022-09-10 LAB
ALBUMIN SERPL ELPH-MCNC: 3.5 G/DL — SIGNIFICANT CHANGE UP (ref 3.5–5.2)
ALP SERPL-CCNC: 125 U/L — HIGH (ref 30–115)
ALT FLD-CCNC: 12 U/L — SIGNIFICANT CHANGE UP (ref 0–41)
ANION GAP SERPL CALC-SCNC: 8 MMOL/L — SIGNIFICANT CHANGE UP (ref 7–14)
AST SERPL-CCNC: 28 U/L — SIGNIFICANT CHANGE UP (ref 0–41)
BASOPHILS # BLD AUTO: 0.02 K/UL — SIGNIFICANT CHANGE UP (ref 0–0.2)
BASOPHILS NFR BLD AUTO: 0.3 % — SIGNIFICANT CHANGE UP (ref 0–1)
BILIRUB SERPL-MCNC: 0.2 MG/DL — SIGNIFICANT CHANGE UP (ref 0.2–1.2)
BUN SERPL-MCNC: 48 MG/DL — HIGH (ref 10–20)
CALCIUM SERPL-MCNC: 8.9 MG/DL — SIGNIFICANT CHANGE UP (ref 8.5–10.1)
CHLORIDE SERPL-SCNC: 104 MMOL/L — SIGNIFICANT CHANGE UP (ref 98–110)
CO2 SERPL-SCNC: 24 MMOL/L — SIGNIFICANT CHANGE UP (ref 17–32)
CREAT SERPL-MCNC: 2.5 MG/DL — HIGH (ref 0.7–1.5)
EGFR: 18 ML/MIN/1.73M2 — LOW
EOSINOPHIL # BLD AUTO: 0.33 K/UL — SIGNIFICANT CHANGE UP (ref 0–0.7)
EOSINOPHIL NFR BLD AUTO: 5.4 % — SIGNIFICANT CHANGE UP (ref 0–8)
GLUCOSE SERPL-MCNC: 98 MG/DL — SIGNIFICANT CHANGE UP (ref 70–99)
HCT VFR BLD CALC: 25.7 % — LOW (ref 37–47)
HGB BLD-MCNC: 8.1 G/DL — LOW (ref 12–16)
IMM GRANULOCYTES NFR BLD AUTO: 0.3 % — SIGNIFICANT CHANGE UP (ref 0.1–0.3)
LYMPHOCYTES # BLD AUTO: 1.97 K/UL — SIGNIFICANT CHANGE UP (ref 1.2–3.4)
LYMPHOCYTES # BLD AUTO: 32 % — SIGNIFICANT CHANGE UP (ref 20.5–51.1)
MCHC RBC-ENTMCNC: 29.1 PG — SIGNIFICANT CHANGE UP (ref 27–31)
MCHC RBC-ENTMCNC: 31.5 G/DL — LOW (ref 32–37)
MCV RBC AUTO: 92.4 FL — SIGNIFICANT CHANGE UP (ref 81–99)
MONOCYTES # BLD AUTO: 0.87 K/UL — HIGH (ref 0.1–0.6)
MONOCYTES NFR BLD AUTO: 14.1 % — HIGH (ref 1.7–9.3)
NEUTROPHILS # BLD AUTO: 2.94 K/UL — SIGNIFICANT CHANGE UP (ref 1.4–6.5)
NEUTROPHILS NFR BLD AUTO: 47.9 % — SIGNIFICANT CHANGE UP (ref 42.2–75.2)
NRBC # BLD: 0 /100 WBCS — SIGNIFICANT CHANGE UP (ref 0–0)
NT-PROBNP SERPL-SCNC: HIGH PG/ML (ref 0–300)
PLATELET # BLD AUTO: 147 K/UL — SIGNIFICANT CHANGE UP (ref 130–400)
POTASSIUM SERPL-MCNC: 5.2 MMOL/L — HIGH (ref 3.5–5)
POTASSIUM SERPL-SCNC: 5.2 MMOL/L — HIGH (ref 3.5–5)
PROT SERPL-MCNC: 6.3 G/DL — SIGNIFICANT CHANGE UP (ref 6–8)
RBC # BLD: 2.78 M/UL — LOW (ref 4.2–5.4)
RBC # FLD: 14.4 % — SIGNIFICANT CHANGE UP (ref 11.5–14.5)
SARS-COV-2 RNA SPEC QL NAA+PROBE: SIGNIFICANT CHANGE UP
SODIUM SERPL-SCNC: 136 MMOL/L — SIGNIFICANT CHANGE UP (ref 135–146)
TROPONIN T SERPL-MCNC: 0.02 NG/ML — HIGH
WBC # BLD: 6.15 K/UL — SIGNIFICANT CHANGE UP (ref 4.8–10.8)
WBC # FLD AUTO: 6.15 K/UL — SIGNIFICANT CHANGE UP (ref 4.8–10.8)

## 2022-09-10 PROCEDURE — 93010 ELECTROCARDIOGRAM REPORT: CPT

## 2022-09-10 PROCEDURE — 93010 ELECTROCARDIOGRAM REPORT: CPT | Mod: 77

## 2022-09-10 PROCEDURE — 99221 1ST HOSP IP/OBS SF/LOW 40: CPT | Mod: GC

## 2022-09-10 PROCEDURE — 71045 X-RAY EXAM CHEST 1 VIEW: CPT | Mod: 26

## 2022-09-10 PROCEDURE — 99285 EMERGENCY DEPT VISIT HI MDM: CPT | Mod: FS

## 2022-09-10 RX ORDER — ALBUTEROL 90 UG/1
2.5 AEROSOL, METERED ORAL ONCE
Refills: 0 | Status: DISCONTINUED | OUTPATIENT
Start: 2022-09-10 | End: 2022-09-15

## 2022-09-10 RX ORDER — ASPIRIN/CALCIUM CARB/MAGNESIUM 324 MG
1 TABLET ORAL
Qty: 0 | Refills: 0 | DISCHARGE

## 2022-09-10 RX ORDER — CALCIUM ACETATE 667 MG
667 TABLET ORAL
Refills: 0 | Status: DISCONTINUED | OUTPATIENT
Start: 2022-09-10 | End: 2022-09-15

## 2022-09-10 RX ORDER — IPRATROPIUM/ALBUTEROL SULFATE 18-103MCG
3 AEROSOL WITH ADAPTER (GRAM) INHALATION EVERY 6 HOURS
Refills: 0 | Status: DISCONTINUED | OUTPATIENT
Start: 2022-09-10 | End: 2022-09-15

## 2022-09-10 RX ORDER — ISOSORBIDE MONONITRATE 60 MG/1
1 TABLET, EXTENDED RELEASE ORAL
Qty: 0 | Refills: 0 | DISCHARGE

## 2022-09-10 RX ORDER — ASPIRIN/CALCIUM CARB/MAGNESIUM 324 MG
81 TABLET ORAL DAILY
Refills: 0 | Status: DISCONTINUED | OUTPATIENT
Start: 2022-09-10 | End: 2022-09-10

## 2022-09-10 RX ORDER — TIMOLOL 0.5 %
1 DROPS OPHTHALMIC (EYE)
Refills: 0 | Status: DISCONTINUED | OUTPATIENT
Start: 2022-09-10 | End: 2022-09-10

## 2022-09-10 RX ORDER — FUROSEMIDE 40 MG
40 TABLET ORAL DAILY
Refills: 0 | Status: DISCONTINUED | OUTPATIENT
Start: 2022-09-11 | End: 2022-09-11

## 2022-09-10 RX ORDER — LATANOPROST 0.05 MG/ML
1 SOLUTION/ DROPS OPHTHALMIC; TOPICAL
Refills: 0 | Status: DISCONTINUED | OUTPATIENT
Start: 2022-09-10 | End: 2022-09-15

## 2022-09-10 RX ORDER — ALBUTEROL 90 UG/1
1 AEROSOL, METERED ORAL ONCE
Refills: 0 | Status: DISCONTINUED | OUTPATIENT
Start: 2022-09-10 | End: 2022-09-15

## 2022-09-10 RX ORDER — APIXABAN 2.5 MG/1
2.5 TABLET, FILM COATED ORAL
Refills: 0 | Status: DISCONTINUED | OUTPATIENT
Start: 2022-09-10 | End: 2022-09-13

## 2022-09-10 RX ORDER — SERTRALINE 25 MG/1
1 TABLET, FILM COATED ORAL
Qty: 0 | Refills: 0 | DISCHARGE

## 2022-09-10 RX ORDER — LIDOCAINE 4 G/100G
1 CREAM TOPICAL DAILY
Refills: 0 | Status: DISCONTINUED | OUTPATIENT
Start: 2022-09-10 | End: 2022-09-15

## 2022-09-10 RX ORDER — LATANOPROST 0.05 MG/ML
1 SOLUTION/ DROPS OPHTHALMIC; TOPICAL
Qty: 0 | Refills: 0 | DISCHARGE

## 2022-09-10 RX ORDER — ISOSORBIDE MONONITRATE 60 MG/1
60 TABLET, EXTENDED RELEASE ORAL DAILY
Refills: 0 | Status: DISCONTINUED | OUTPATIENT
Start: 2022-09-10 | End: 2022-09-15

## 2022-09-10 RX ORDER — FUROSEMIDE 40 MG
40 TABLET ORAL ONCE
Refills: 0 | Status: COMPLETED | OUTPATIENT
Start: 2022-09-10 | End: 2022-09-10

## 2022-09-10 RX ORDER — AMLODIPINE BESYLATE 2.5 MG/1
5 TABLET ORAL DAILY
Refills: 0 | Status: DISCONTINUED | OUTPATIENT
Start: 2022-09-10 | End: 2022-09-11

## 2022-09-10 RX ORDER — ATORVASTATIN CALCIUM 80 MG/1
20 TABLET, FILM COATED ORAL AT BEDTIME
Refills: 0 | Status: DISCONTINUED | OUTPATIENT
Start: 2022-09-10 | End: 2022-09-15

## 2022-09-10 RX ORDER — FOLIC ACID 0.8 MG
1 TABLET ORAL DAILY
Refills: 0 | Status: DISCONTINUED | OUTPATIENT
Start: 2022-09-10 | End: 2022-09-15

## 2022-09-10 RX ORDER — HYDRALAZINE HCL 50 MG
25 TABLET ORAL THREE TIMES A DAY
Refills: 0 | Status: DISCONTINUED | OUTPATIENT
Start: 2022-09-10 | End: 2022-09-12

## 2022-09-10 RX ADMIN — Medication 3 MILLILITER(S): at 23:50

## 2022-09-10 RX ADMIN — Medication 40 MILLIGRAM(S): at 19:09

## 2022-09-10 RX ADMIN — Medication 25 MILLIGRAM(S): at 22:17

## 2022-09-10 RX ADMIN — ATORVASTATIN CALCIUM 20 MILLIGRAM(S): 80 TABLET, FILM COATED ORAL at 22:17

## 2022-09-10 NOTE — ED PROVIDER NOTE - NS ED ROS FT
CONST: No fever, chills or bodyaches  EYES: No pain, redness, drainage or visual changes.  ENT: No ear pain or discharge, nasal discharge or congestion. No sore throat  CARD: No chest pain, palpitations  RESP: (+) SOB. No cough, hemoptysis. No hx of asthma or COPD  GI: No abdominal pain, N/V/D  : No urinary symptoms  MS: No joint pain, back pain or extremity pain/injury. (+) leg swelling  SKIN: No rashes  NEURO: No headache, dizziness, paresthesias or LOC

## 2022-09-10 NOTE — ED ADULT NURSE NOTE - NSIMPLEMENTINTERV_GEN_ALL_ED
Implemented All Fall Risk Interventions:  [place] to call system. Call bell, personal items and telephone within reach. Instruct patient to call for assistance. Room bathroom lighting operational. Non-slip footwear when patient is off stretcher. Physically safe environment: no spills, clutter or unnecessary equipment. Stretcher in lowest position, wheels locked, appropriate side rails in place. Provide visual cue, wrist band, yellow gown, etc. Monitor gait and stability. Monitor for mental status changes and reorient to person, place, and time. Review medications for side effects contributing to fall risk. Reinforce activity limits and safety measures with patient and family.

## 2022-09-10 NOTE — ED PROVIDER NOTE - PHYSICAL EXAMINATION
Physical Exam    Vital Signs: I have reviewed the initial vital signs.  Constitutional: well-nourished, appears stated age, no acute distress  Eyes: Conjunctiva pink, Sclera clear  Cardiovascular: S1 and S2, regular rate, regular rhythm, well-perfused extremities, pedal pulses equal and 2+ b/l.   Respiratory: unlabored respiratory effort, (+) b/l crackles in the lower lobes. pt is speaking full sentences. no accessory muscle use.   Gastrointestinal: soft, non-tender, nondistended abdomen, no pulsatile mass, normal bowl sounds, no rebound, no guarding  Musculoskeletal: supple neck, (+) b/l lower extremity edema, no calf tenderness  Integumentary: warm, dry, no rash  Neurologic: awake, alert, cranial nerves II-XII grossly intact, extremities’ motor and sensory functions grossly intact.   Psychiatric: appropriate mood, appropriate affect

## 2022-09-10 NOTE — H&P ADULT - NSHPLABSRESULTS_GEN_ALL_CORE
8.1    6.15  )-----------( 147      ( 10 Sep 2022 16:30 )             25.7       09-10    136  |  104  |  48<H>  ----------------------------<  98  5.2<H>   |  24  |  2.5<H>    Ca    8.9      10 Sep 2022 16:30    TPro  6.3  /  Alb  3.5  /  TBili  0.2  /  DBili  x   /  AST  28  /  ALT  12  /  AlkPhos  125<H>  09-10

## 2022-09-10 NOTE — H&P ADULT - ASSESSMENT
86 year old patient (Catholic), known to have HFrEF (Echo 3/2022: EF 25-30%) s/p AICD, HTN, provoked PE/DVT on Eliquis, DLD, CKD 4 and glaucoma, presented to ED with SOB of duration. Admitted for further management of HF exacerbation.     #Acute on CHF exacerbation   #HFrEF  - In ED, vitals wnl  - Laboratory workup significant for Troponin 0.02 BNP 57047  - Chest X-ray: congestion (unofficial read)   - s/p 1 dose of IV Lasix 40 mg in ED  - Echo 3/2022: EF 25-30%  - Repeat Echo  - Get EKG  - Started on IV Lasix 40 mg BID  - c/w Aspirin, Statin, metoprolol and Imdur   - No ACE inhibitors/ ARB/ Entresto/ spironolactone since patient can become severely hyperkalemic as per cardiology   - On room air saturating well   - Cardiology consulted     #Anemia  - Hb 8.1 (at baseline)  - s/p EGD and colonoscopy in 2020 with non-specific gastritis and Diverticulosis - recommendation was to repeat colonoscopy in 1 year, which she did not   - NO BLOOD TRANSFUSIONS AS PER PT WISHES, JEHOVA'S WITNESS  - Monitor H+H  - Keep active T+S     #Troponemia in the setting of CKD4   - Troponin 0.02 (around baseline)  - F/u repeat troponins      #CKD stage 4  - Cr 2.5 eGFR 18 (at baseline)   - Follows Dr. Urrutia   - Avoid nephrotoxic agents   - c/w calcium acetate     #HTN  - c/w Amlodipine   - NO ACE inhibitors/ ARB/ Entresto/ spironolactone since patient can become severely hyperkalemic     #Hx of PE/DVT  - Hx of provoked PE in the past and soleal vein thrombosis years ago?  - c/w Eliquis 2.5 mg bid     #HDL  - c/w atorvastatin 20    #Glaucoma  - c/w eye drops    - DVT prophylaxis: Eliquis   - GI prophylaxis: PPI   - Diet: DASH   - Activity status: IAT   - Disposition: admit to medicine    86 year old patient (Oriental orthodox), known to have HFrEF (Echo 3/2022: EF 25-30%) s/p AICD, HTN, provoked PE/DVT on Eliquis, DLD, CKD 4 and glaucoma, presented to ED with SOB of duration. Admitted for further management of HF exacerbation.     #Acute on CHF exacerbation   #HFrEF  - In ED, vitals wnl  - Laboratory workup significant for Troponin 0.02 BNP 56385  - Chest X-ray: congestion (unofficial read)   - s/p 1 dose of IV Lasix 40 mg in ED  - Echo 3/2022: EF 25-30%  - Repeat Echo  - Get EKG  - Started on IV Lasix 40 mg daily - reassess in am   - c/w Statin and Imdur   - No ACE inhibitors/ ARB/ Entresto/ spironolactone since patient can become severely hyperkalemic as per cardiology   - On room air saturating well   - Cardiology consulted     #B/L shoulder pain  #B/L knee pain   - Previous shoulder xray shows degenerative changes of the glenohumeral and acromioclavicular joints noted.  - Previous labs are significant for elevated uric acid and inflammatory markers, negative cyclic citrullinated peptide   - Patient was evaluated by rheumatology in the past  - c/w with pain control PRN, warm compress  - Follow B/L knee X-rays   - PT/OT    #Anemia  - Hb 8.1 (at baseline)  - s/p EGD and colonoscopy in 2020 with non-specific gastritis and Diverticulosis - recommendation was to repeat colonoscopy in 1 year, which she did not   - NO BLOOD TRANSFUSIONS AS PER PT WISHES, JEHOVA'S WITNESS  - Monitor H+H  - Keep active T+S     #Troponemia in the setting of CKD4   - Troponin 0.02 (around baseline)  - F/u repeat troponin       #CKD stage 4  - Cr 2.5 eGFR 18 (at baseline)   - Follows Dr. Urrutia   - Avoid nephrotoxic agents   - c/w calcium acetate     #HTN  - c/w Amlodipine and Hydralazine   - NO ACE inhibitors/ ARB/ Entresto/ spironolactone since patient can become severely hyperkalemic     #Hx of PE/DVT  - Hx of provoked PE in the past and soleal vein thrombosis years ago?  - c/w Eliquis 2.5 mg bid     #HDL  - c/w atorvastatin 20    #Glaucoma  - c/w eye drops    - DVT prophylaxis: Eliquis   - GI prophylaxis: PPI   - Diet: DASH   - Activity status: IAT   - Disposition: admit to medicine

## 2022-09-10 NOTE — ED PROVIDER NOTE - CLINICAL SUMMARY MEDICAL DECISION MAKING FREE TEXT BOX
Patient presented with shortness of breath x1 week associated with bilateral lower extremity swelling.  No chest pain.  Otherwise on arrival patient afebrile, hemodynamically stable, normal O2 saturation on room air.  EKG obtained which showed marked ST depressions in the inferior lateral leads but no ST elevation.  Labs obtained and remarkable for mildly elevated troponin of 0.02 as well as proBNP of 16,000, no other significant abnormalities.  Chest x-ray appears to have bilateral congestion but no focal consolidations.  Given that patient is symptomatic on ambulation with significant shortness of breath, will give dose of IV Lasix and admit for further management.  Patient agreeable with plan.  Hemodynamically stable at time of admission.

## 2022-09-10 NOTE — ED PROVIDER NOTE - OBJECTIVE STATEMENT
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 pt reports she was started on amlodipine about 1 month ago. pt denies chest pain, back pain, fever, chills, cough, abdominal pain, n/v/d/c, weakness, recent travel, recent trauma, recent surgeries, recent hospitalizations, hx of cancer, and use of hormones.

## 2022-09-10 NOTE — ED PROVIDER NOTE - CARE PLAN
Principal Discharge DX:	CHF exacerbation  Secondary Diagnosis:	SOB (shortness of breath)  Secondary Diagnosis:	Leg swelling   1

## 2022-09-10 NOTE — H&P ADULT - NSHPPHYSICALEXAM_GEN_ALL_CORE
T(C): 36.9 (09-10-22 @ 15:31), Max: 36.9 (09-10-22 @ 15:31)  HR: 66 (09-10-22 @ 15:31) (66 - 66)  BP: 135/68 (09-10-22 @ 15:31) (135/68 - 135/68)  RR: 18 (09-10-22 @ 15:31) (18 - 18)  SpO2: 100% (09-10-22 @ 15:31) (100% - 100%)    CONSTITUTIONAL: Well groomed, no apparent distress  RESP: No respiratory distress, no use of accessory muscles; decreased breath sounds bilaterally   CV: RRR, +S1S2;  Abdomen: Soft, non tender, no distention, no hepatomegaly; no hernia palpated   NEURO: AAOx3; no focal neurologic deficits

## 2022-09-10 NOTE — ED ADULT NURSE NOTE - CAS TRG GENERAL NORM CIRC DET
Ventricular Rate : 112  Atrial Rate : 109  P-R Interval : 123  QRS Duration : 84  Q-T Interval : 347  QTC Calculation(Bezet) : 474  P Axis : 77  R Axis : -33  T Axis : 46  Diagnosis : Sinus tachycardia  Left axis deviation  Low voltage, precordial leads    Confirmed by Cristiano Alan ([id number]) on 7/29/2019 8:17:21 PM  
Strong peripheral pulses

## 2022-09-10 NOTE — H&P ADULT - ATTENDING COMMENTS
HPI:  86 year old patient ([employer]), known to hav  HFrEF (Echo 3/2022: EF 25-30%) s/p AICD, pulm HTN, provoked PE/DVT on Eliquis, DLD, CKD 4, RA and glaucoma, presented to ED with SOB and worsening LE edema of 1 day duration. Associated with orthopnea.   Patient also reports worsening pain in her bilateral shoulders and knees. To note patient was recently admitted on 7/2022 for bilateral shoulder pain.   She denies fever, chills, headache, upper respiratory symptoms, abdominal or urinary symptoms. No chest pain, no palpitations. No sick contacts.   She is compliant with her medications.     In ED, vitals wnl  Laboratory workup significant for Hb 8.1 (at baseline) K 5.2 Cr 2.5 eGFR 18 (at baseline)   Troponin 0.02 BNP [id number]  Chest X-ray: congestion (unofficial read)   She received 1 dose of IV Lasix 40 mg in ED.  (10 Sep 2022 20:44)    REVIEW OF SYSTEMS: see cc/HPI   CONSTITUTIONAL: No weakness, fevers or chills  EYES/ENT: No visual changes;  No vertigo or throat pain   NECK: No pain or stiffness  RESPIRATORY: No cough, wheezing, hemoptysis; No shortness of breath  CARDIOVASCULAR: No chest pain or palpitations  GASTROINTESTINAL: No abdominal or epigastric pain. No nausea, vomiting, or hematemesis; No diarrhea or constipation. No melena or hematochezia.  GENITOURINARY: No dysuria, frequency or hematuria  NEUROLOGICAL: No numbness or weakness  SKIN: No itching, rashes  Musculoskeletal : multiple joint pains , see cc/HPI     Physical Exam:   General: WN/WD NAD  Neurology: A&Ox3, nonfocal, follows commands  Eyes: PERRLA/ EOMI  ENT/Neck: Neck supple, trachea midline, No JVD  Respiratory: CTA B/L, No wheezing, rales, rhonchi  CV: Normal rate regular rhythm, S1S2, no murmurs, rubs or gallops  Abdominal: Soft, NT, ND +BS,   Extremities: No edema, + peripheral pulses  Skin: No Rashes, Hematoma, Ecchymosis    A/p    Plan pending     Seen by Attending 9/10/22 ( note revised 9/11) HPI:  86 year old patient (Moravian), known to hav  HFrEF (Echo 3/2022: EF 25-30%) s/p AICD, pulm HTN, provoked PE/DVT on Eliquis, DLD, CKD 4, RA and glaucoma, presented to ED with SOB and worsening LE edema of 1 day duration. Associated with orthopnea.   Patient also reports worsening pain in her bilateral shoulders and knees. To note patient was recently admitted on 7/2022 for bilateral shoulder pain.   She denies fever, chills, headache, upper respiratory symptoms, abdominal or urinary symptoms. No chest pain, no palpitations. No sick contacts.   She is compliant with her medications.     In ED, vitals wnl  Laboratory workup significant for Hb 8.1 (at baseline) K 5.2 Cr 2.5 eGFR 18 (at baseline)   Troponin 0.02 BNP [id number]  Chest X-ray: congestion (unofficial read)   She received 1 dose of IV Lasix 40 mg in ED.  (10 Sep 2022 20:44)    REVIEW OF SYSTEMS: see cc/HPI   CONSTITUTIONAL: No weakness, fevers or chills  EYES/ENT: No visual changes;  No vertigo or throat pain   NECK: No pain or stiffness  RESPIRATORY: No cough, wheezing, hemoptysis; No shortness of breath  CARDIOVASCULAR: No chest pain or palpitations  GASTROINTESTINAL: No abdominal or epigastric pain. No nausea, vomiting, or hematemesis; No diarrhea or constipation. No melena or hematochezia.  GENITOURINARY: No dysuria, frequency or hematuria  NEUROLOGICAL: No numbness or weakness  SKIN: No itching, rashes  Musculoskeletal : multiple joint pains , see cc/HPI     Physical Exam:   General: WN/WD NAD  Neurology: A&Ox3, nonfocal, follows commands  Eyes: PERRLA/ EOMI  ENT/Neck: Neck supple, trachea midline, No JVD  Respiratory: CTA B/L, No wheezing, rales, rhonchi  CV: Normal rate regular rhythm, S1S2, no murmurs, rubs or gallops  Abdominal: Soft, NT, ND +BS,   Extremities: No edema, + peripheral pulses  Skin: No Rashes, Hematoma, Ecchymosis    A/p  Acute resp failure with hypoxia 2/2 HFrEF  Acute decompensated HFrEF   -Admit to tele   -BiPAP overnight w/ O2 supplementation and pulse ox monitoring   -IV lasix/Is and Os/ daily weights   -serial CE and Trop   -Cardiology eval   -ASA/Statin / Indur    Bilateral shoulder / knee pain   - PRN pain     Anemia - at baseline   -serial monitoring but no transfusions - [employer]astrianism     CKD IV   HTN -uncontrolled   -monitoring Scr while being diuresed   -IV lasix added overnight     H/o DVT /PE  -c/w Eliquis     Dyslipidemia   -statin     Glaucoma   -c/w current Rx     Seen by Attending 9/10/22 ( note revised 9/11)

## 2022-09-10 NOTE — ED ADULT NURSE NOTE - AS SC BRADEN ACTIVITY
Alert and oriented male, told to come in for elevated potassium, pt denies any complaints
(4) walks frequently

## 2022-09-11 LAB
ALBUMIN SERPL ELPH-MCNC: 3.8 G/DL — SIGNIFICANT CHANGE UP (ref 3.5–5.2)
ALBUMIN SERPL ELPH-MCNC: 3.9 G/DL — SIGNIFICANT CHANGE UP (ref 3.5–5.2)
ALP SERPL-CCNC: 140 U/L — HIGH (ref 30–115)
ALP SERPL-CCNC: 152 U/L — HIGH (ref 30–115)
ALT FLD-CCNC: 12 U/L — SIGNIFICANT CHANGE UP (ref 0–41)
ALT FLD-CCNC: 14 U/L — SIGNIFICANT CHANGE UP (ref 0–41)
ANION GAP SERPL CALC-SCNC: 12 MMOL/L — SIGNIFICANT CHANGE UP (ref 7–14)
ANION GAP SERPL CALC-SCNC: 14 MMOL/L — SIGNIFICANT CHANGE UP (ref 7–14)
AST SERPL-CCNC: 30 U/L — SIGNIFICANT CHANGE UP (ref 0–41)
AST SERPL-CCNC: 35 U/L — SIGNIFICANT CHANGE UP (ref 0–41)
BASOPHILS # BLD AUTO: 0.03 K/UL — SIGNIFICANT CHANGE UP (ref 0–0.2)
BASOPHILS NFR BLD AUTO: 0.3 % — SIGNIFICANT CHANGE UP (ref 0–1)
BILIRUB SERPL-MCNC: 0.4 MG/DL — SIGNIFICANT CHANGE UP (ref 0.2–1.2)
BILIRUB SERPL-MCNC: 0.4 MG/DL — SIGNIFICANT CHANGE UP (ref 0.2–1.2)
BUN SERPL-MCNC: 50 MG/DL — HIGH (ref 10–20)
BUN SERPL-MCNC: 50 MG/DL — HIGH (ref 10–20)
CALCIUM SERPL-MCNC: 9 MG/DL — SIGNIFICANT CHANGE UP (ref 8.5–10.1)
CALCIUM SERPL-MCNC: 9 MG/DL — SIGNIFICANT CHANGE UP (ref 8.5–10.1)
CHLORIDE SERPL-SCNC: 104 MMOL/L — SIGNIFICANT CHANGE UP (ref 98–110)
CHLORIDE SERPL-SCNC: 99 MMOL/L — SIGNIFICANT CHANGE UP (ref 98–110)
CO2 SERPL-SCNC: 21 MMOL/L — SIGNIFICANT CHANGE UP (ref 17–32)
CO2 SERPL-SCNC: 22 MMOL/L — SIGNIFICANT CHANGE UP (ref 17–32)
CREAT SERPL-MCNC: 2.3 MG/DL — HIGH (ref 0.7–1.5)
CREAT SERPL-MCNC: 2.4 MG/DL — HIGH (ref 0.7–1.5)
D DIMER BLD IA.RAPID-MCNC: 348 NG/ML DDU — HIGH (ref 0–230)
EGFR: 19 ML/MIN/1.73M2 — LOW
EGFR: 20 ML/MIN/1.73M2 — LOW
EOSINOPHIL # BLD AUTO: 0.12 K/UL — SIGNIFICANT CHANGE UP (ref 0–0.7)
EOSINOPHIL NFR BLD AUTO: 1.1 % — SIGNIFICANT CHANGE UP (ref 0–8)
GLUCOSE SERPL-MCNC: 108 MG/DL — HIGH (ref 70–99)
GLUCOSE SERPL-MCNC: 168 MG/DL — HIGH (ref 70–99)
HCT VFR BLD CALC: 33.5 % — LOW (ref 37–47)
HGB BLD-MCNC: 10.7 G/DL — LOW (ref 12–16)
IMM GRANULOCYTES NFR BLD AUTO: 0.4 % — HIGH (ref 0.1–0.3)
LACTATE SERPL-SCNC: 0.9 MMOL/L — SIGNIFICANT CHANGE UP (ref 0.7–2)
LYMPHOCYTES # BLD AUTO: 1.64 K/UL — SIGNIFICANT CHANGE UP (ref 1.2–3.4)
LYMPHOCYTES # BLD AUTO: 15.4 % — LOW (ref 20.5–51.1)
MAGNESIUM SERPL-MCNC: 1.8 MG/DL — SIGNIFICANT CHANGE UP (ref 1.8–2.4)
MCHC RBC-ENTMCNC: 28.9 PG — SIGNIFICANT CHANGE UP (ref 27–31)
MCHC RBC-ENTMCNC: 31.9 G/DL — LOW (ref 32–37)
MCV RBC AUTO: 90.5 FL — SIGNIFICANT CHANGE UP (ref 81–99)
MONOCYTES # BLD AUTO: 1.01 K/UL — HIGH (ref 0.1–0.6)
MONOCYTES NFR BLD AUTO: 9.5 % — HIGH (ref 1.7–9.3)
NEUTROPHILS # BLD AUTO: 7.82 K/UL — HIGH (ref 1.4–6.5)
NEUTROPHILS NFR BLD AUTO: 73.3 % — SIGNIFICANT CHANGE UP (ref 42.2–75.2)
NRBC # BLD: 0 /100 WBCS — SIGNIFICANT CHANGE UP (ref 0–0)
PHOSPHATE SERPL-MCNC: 3.4 MG/DL — SIGNIFICANT CHANGE UP (ref 2.1–4.9)
PLATELET # BLD AUTO: 181 K/UL — SIGNIFICANT CHANGE UP (ref 130–400)
POTASSIUM SERPL-MCNC: 4.8 MMOL/L — SIGNIFICANT CHANGE UP (ref 3.5–5)
POTASSIUM SERPL-MCNC: 5.5 MMOL/L — HIGH (ref 3.5–5)
POTASSIUM SERPL-SCNC: 4.8 MMOL/L — SIGNIFICANT CHANGE UP (ref 3.5–5)
POTASSIUM SERPL-SCNC: 5.5 MMOL/L — HIGH (ref 3.5–5)
PROT SERPL-MCNC: 6.8 G/DL — SIGNIFICANT CHANGE UP (ref 6–8)
PROT SERPL-MCNC: 7 G/DL — SIGNIFICANT CHANGE UP (ref 6–8)
RBC # BLD: 3.7 M/UL — LOW (ref 4.2–5.4)
RBC # FLD: 14.2 % — SIGNIFICANT CHANGE UP (ref 11.5–14.5)
SODIUM SERPL-SCNC: 134 MMOL/L — LOW (ref 135–146)
SODIUM SERPL-SCNC: 138 MMOL/L — SIGNIFICANT CHANGE UP (ref 135–146)
TROPONIN T SERPL-MCNC: 0.02 NG/ML — HIGH
TROPONIN T SERPL-MCNC: 0.02 NG/ML — HIGH
WBC # BLD: 10.66 K/UL — SIGNIFICANT CHANGE UP (ref 4.8–10.8)
WBC # FLD AUTO: 10.66 K/UL — SIGNIFICANT CHANGE UP (ref 4.8–10.8)

## 2022-09-11 PROCEDURE — 99233 SBSQ HOSP IP/OBS HIGH 50: CPT

## 2022-09-11 PROCEDURE — 71045 X-RAY EXAM CHEST 1 VIEW: CPT | Mod: 26

## 2022-09-11 PROCEDURE — 73562 X-RAY EXAM OF KNEE 3: CPT | Mod: 26,50

## 2022-09-11 PROCEDURE — 93010 ELECTROCARDIOGRAM REPORT: CPT

## 2022-09-11 PROCEDURE — 93970 EXTREMITY STUDY: CPT | Mod: 26

## 2022-09-11 PROCEDURE — 99222 1ST HOSP IP/OBS MODERATE 55: CPT

## 2022-09-11 RX ORDER — SODIUM ZIRCONIUM CYCLOSILICATE 10 G/10G
10 POWDER, FOR SUSPENSION ORAL ONCE
Refills: 0 | Status: COMPLETED | OUTPATIENT
Start: 2022-09-11 | End: 2022-09-11

## 2022-09-11 RX ORDER — NIFEDIPINE 30 MG
60 TABLET, EXTENDED RELEASE 24 HR ORAL DAILY
Refills: 0 | Status: DISCONTINUED | OUTPATIENT
Start: 2022-09-12 | End: 2022-09-15

## 2022-09-11 RX ORDER — FUROSEMIDE 40 MG
40 TABLET ORAL
Refills: 0 | Status: DISCONTINUED | OUTPATIENT
Start: 2022-09-11 | End: 2022-09-12

## 2022-09-11 RX ORDER — FUROSEMIDE 40 MG
20 TABLET ORAL ONCE
Refills: 0 | Status: COMPLETED | OUTPATIENT
Start: 2022-09-11 | End: 2022-09-11

## 2022-09-11 RX ORDER — AMLODIPINE BESYLATE 2.5 MG/1
5 TABLET ORAL ONCE
Refills: 0 | Status: COMPLETED | OUTPATIENT
Start: 2022-09-11 | End: 2022-09-11

## 2022-09-11 RX ADMIN — Medication 40 MILLIGRAM(S): at 06:47

## 2022-09-11 RX ADMIN — SODIUM ZIRCONIUM CYCLOSILICATE 10 GRAM(S): 10 POWDER, FOR SUSPENSION ORAL at 09:06

## 2022-09-11 RX ADMIN — ATORVASTATIN CALCIUM 20 MILLIGRAM(S): 80 TABLET, FILM COATED ORAL at 22:55

## 2022-09-11 RX ADMIN — Medication 25 MILLIGRAM(S): at 13:06

## 2022-09-11 RX ADMIN — Medication 3 MILLILITER(S): at 17:34

## 2022-09-11 RX ADMIN — Medication 40 MILLIGRAM(S): at 17:30

## 2022-09-11 RX ADMIN — LIDOCAINE 1 PATCH: 4 CREAM TOPICAL at 12:32

## 2022-09-11 RX ADMIN — AMLODIPINE BESYLATE 5 MILLIGRAM(S): 2.5 TABLET ORAL at 06:47

## 2022-09-11 RX ADMIN — Medication 40 MILLIGRAM(S): at 00:09

## 2022-09-11 RX ADMIN — LATANOPROST 1 DROP(S): 0.05 SOLUTION/ DROPS OPHTHALMIC; TOPICAL at 06:47

## 2022-09-11 RX ADMIN — Medication 3 MILLILITER(S): at 22:54

## 2022-09-11 RX ADMIN — Medication 667 MILLIGRAM(S): at 12:31

## 2022-09-11 RX ADMIN — Medication 25 MILLIGRAM(S): at 22:55

## 2022-09-11 RX ADMIN — APIXABAN 2.5 MILLIGRAM(S): 2.5 TABLET, FILM COATED ORAL at 06:47

## 2022-09-11 RX ADMIN — APIXABAN 2.5 MILLIGRAM(S): 2.5 TABLET, FILM COATED ORAL at 17:29

## 2022-09-11 RX ADMIN — Medication 1 MILLIGRAM(S): at 12:32

## 2022-09-11 RX ADMIN — LIDOCAINE 1 PATCH: 4 CREAM TOPICAL at 19:00

## 2022-09-11 RX ADMIN — Medication 667 MILLIGRAM(S): at 09:06

## 2022-09-11 RX ADMIN — LATANOPROST 1 DROP(S): 0.05 SOLUTION/ DROPS OPHTHALMIC; TOPICAL at 17:33

## 2022-09-11 RX ADMIN — ISOSORBIDE MONONITRATE 60 MILLIGRAM(S): 60 TABLET, EXTENDED RELEASE ORAL at 12:31

## 2022-09-11 RX ADMIN — Medication 25 MILLIGRAM(S): at 06:47

## 2022-09-11 RX ADMIN — Medication 20 MILLIGRAM(S): at 00:45

## 2022-09-11 RX ADMIN — AMLODIPINE BESYLATE 5 MILLIGRAM(S): 2.5 TABLET ORAL at 09:06

## 2022-09-11 NOTE — PHYSICAL THERAPY INITIAL EVALUATION ADULT - ADDITIONAL COMMENTS
Patient lives in  house with steps to enter home. Has HHA to assist patient in ADL's. Ambulates in home independently using RW or cane.

## 2022-09-11 NOTE — PROGRESS NOTE ADULT - SUBJECTIVE AND OBJECTIVE BOX
Outpt cardiologist:    HPI:  86 year old patient ([employer]), known to hav  HFrEF (Echo 3/2022: EF 25-30%) s/p AICD, pulm HTN, provoked PE/DVT on Eliquis, DLD, CKD 4, RA and glaucoma, presented to ED with SOB and worsening LE edema of 1 day duration. Associated with orthopnea.   Patient also reports worsening pain in her bilateral shoulders and knees. To note patient was recently admitted on 2022 for bilateral shoulder pain.   She denies fever, chills, headache, upper respiratory symptoms, abdominal or urinary symptoms. No chest pain, no palpitations. No sick contacts.   She is compliant with her medications.     In ED, vitals wnl  Laboratory workup significant for Hb 8.1 (at baseline) K 5.2 Cr 2.5 eGFR 18 (at baseline)   Troponin 0.02 BNP 93316  Chest X-ray: congestion (unofficial read)   She received 1 dose of IV Lasix 40 mg in ED.  (10 Sep 2022 20:44)    PAST MEDICAL & SURGICAL HISTORY  Chronic kidney disease    Pacemaker    Hypertension    Gout    Diverticulitis  sigmoid    Diastolic heart failure    Rheumatoid arthritis    DVT, lower extremity  Bilateral    Artificial pacemaker    History of appendectomy    History of tonsillectomy    History of hysterectomy    H/O hernia repair        FAMILY HISTORY:  FAMILY HISTORY:  FH: arthritis  sister        SOCIAL HISTORY:  Social History:      ALLERGIES:  Keflex (Swelling)      MEDICATIONS:  ALBUTerol    90 MICROgram(s) HFA Inhaler 1 Puff(s) Inhalation once  albuterol/ipratropium for Nebulization 3 milliLiter(s) Nebulizer every 6 hours  apixaban 2.5 milliGRAM(s) Oral two times a day  atorvastatin 20 milliGRAM(s) Oral at bedtime  calcium acetate 667 milliGRAM(s) Oral three times a day with meals  folic acid 1 milliGRAM(s) Oral daily  furosemide   Injectable 40 milliGRAM(s) IV Push two times a day  hydrALAZINE 25 milliGRAM(s) Oral three times a day  isosorbide   mononitrate ER Tablet (IMDUR) 60 milliGRAM(s) Oral daily  latanoprost 0.005% Ophthalmic Solution 1 Drop(s) Both EYES <User Schedule>  lidocaine   4% Patch 1 Patch Transdermal daily    PRN:  ALBUTerol    0.083%. 2.5 milliGRAM(s) Nebulizer once PRN      HOME MEDICATIONS:  Home Medications:  amLODIPine 5 mg oral tablet: 1 tab(s) orally once a day (10 Sep 2022 21:37)  apixaban 2.5 mg oral tablet: 1 tab(s) orally 2 times a day (10 Sep 2022 21:37)  calcium (as carbonate) 600 mg oral tablet: 1 tab(s) orally 3 times a day (10 Sep 2022 21:37)  calcium acetate 667 mg oral capsule: 2 cap(s) orally 3 times a day (with meals) (10 Sep 2022 21:37)  folic acid 1 mg oral tablet: 1 tab(s) orally once a day (10 Sep 2022 21:37)  furosemide 40 mg oral tablet: 1 tab(s) orally once a day (10 Sep 2022 21:37)  hydrALAZINE 25 mg oral tablet: 1 tab(s) orally 3 times a day (10 Sep 2022 21:37)  isosorbide mononitrate 60 mg oral tablet, extended release: 1 tab(s) orally once a day (in the morning) (10 Sep 2022 21:37)  latanoprost 0.005% ophthalmic solution: 1 drop(s) to each affected eye 2 times a day (10 Sep 2022 21:37)  Lipitor 20 mg oral tablet: 1 tab(s) orally once a day (at bedtime) (10 Sep 2022 21:37)      VITALS:   T(F): 98.2 ( @ 16:14), Max: 98.4 (09-10 @ 15:31)  HR: 89 ( @ 16:14) (66 - 89)  BP: 144/77 ( @ 16:14) (135/68 - 181/88)  BP(mean): --  RR: 18 ( @ 16:14) (17 - 18)  SpO2: 100% ( @ 16:14) (96% - 100%)    I&O's Summary      REVIEW OF SYSTEMS:  CONSTITUTIONAL: No weakness, fevers or chills  HEENT: No visual changes, neck/ear pain  RESPIRATORY: No cough, sob  CARDIOVASCULAR: See HPI  GASTROINTESTINAL: No abdominal pain. No nausea, vomiting, diarrhea   GENITOURINARY: No dysuria, frequency or hematuria  NEUROLOGICAL: No new focal deficits  SKIN: No new rashes    PHYSICAL EXAM:  General: Not in distress.  Non-toxic appearing.   HEENT: EOMI  Cardio: regular, S1, S2, no murmur  Pulm: Decreased BS bilaterally  Abdomen: Soft, non-tender, non-distended. Normoactive bowel sounds  Extremities: b/l LE edema  Neuro: A&O x3. No focal deficits    LABS:                        10.7   10.66 )-----------( 181      ( 11 Sep 2022 06:27 )             33.5         138  |  104  |  50<H>  ----------------------------<  108<H>  5.5<H>   |  22  |  2.3<H>    Ca    9.0      11 Sep 2022 06:27  Phos  3.4       Mg     1.8         TPro  7.0  /  Alb  3.9  /  TBili  0.4  /  DBili  x   /  AST  35  /  ALT  14  /  AlkPhos  152<H>        Troponin T, Serum: 0.02 ng/mL *H* (22 @ 06:27)  Troponin T, Serum: 0.02 ng/mL *H* (22 @ 00:31)  Lactate, Blood: 0.9 mmol/L (22 @ 00:31)  Troponin T, Serum: 0.02 ng/mL *H* (09-10-22 @ 16:30)    CARDIAC MARKERS ( 11 Sep 2022 06:27 )  x     / 0.02 ng/mL / x     / x     / x      CARDIAC MARKERS ( 11 Sep 2022 00:31 )  x     / 0.02 ng/mL / x     / x     / x      CARDIAC MARKERS ( 10 Sep 2022 16:30 )  x     / 0.02 ng/mL / x     / x     / x            Troponin trend:    Serum Pro-Brain Natriuretic Peptide: 18466 pg/mL (09-10-22 @ 16:30)      COVID-19 PCR: NotDetec (10 Sep 2022 20:09)  COVID-19 PCR: NotDetec (2022 14:17)  COVID-19 PCR: NotDetec (2022 13:10)  COVID-19 PCR: NotDetec (2022 17:07)  COVID-19 PCR: NotDetec (2022 10:34)  COVID-19 PCR: NotDetec (2022 16:04)  COVID-19 PCR: NotDetec (28 Mar 2022 00:30)      RADIOLOGY:  -CXR:  -TTE:  -CCTA:  -STRESS TEST:  -CATHETERIZATION:  -OTHER:  EC Lead ECG:   Ventricular Rate 91 BPM    Atrial Rate 91 BPM    P-R Interval 144 ms    QRS Duration 128 ms    Q-T Interval 384 ms    QTC Calculation(Bazett) 472 ms    P Axis 58 degrees    R Axis 2 degrees    T Axis 176 degrees    Diagnosis Line Normal sinus rhythm  Non-specific intra-ventricular conduction block  T wave abnormality, consider inferolateral ischemia  Abnormal ECG    Confirmed by Abdulkadir Chopra ([id number]) on 2022 12:52:00 PM ( @ 00:07)      TELEMETRY EVENTS: NSR @70 bpm    < from: TTE Echo Complete w/o Contrast w/ Doppler (22 @ 09:56) >  Summary:   1. Left ventricular ejection fraction, by visual estimation, is 25 to   30%.   2. Moderately to severely decreased global left ventricular systolic   function.   3. Spectral Doppler shows impaired relaxation pattern of left   ventricular myocardial filling (Grade I diastolic dysfunction).   4. Moderately enlarged leftatrium.   5. Normal right atrial size.   6. Mild thickening of the anterior and posterior mitral valve leaflets.   7. Moderate mitral valve regurgitation.   8. Severe mitral annular calcification.   9. Moderate tricuspid regurgitation.  10. PSAP 40.  11. Mild pulmonic valve regurgitation.    < end of copied text >     HPI:  86 year old patient ([employer]), known to have HFrEF (Echo 3/2022: EF 25-30%) s/p AICD, pulm HTN, provoked PE/DVT on Eliquis, DLD, CKD 4, RA and glaucoma, presented to ED with SOB and worsening LE edema of 1 day duration. Associated with orthopnea.    Patient also reports worsening pain in her bilateral shoulders and knees. To note patient was recently admitted on 7/2022 for bilateral shoulder pain.   She denies fever, chills, headache, upper respiratory symptoms, abdominal or urinary symptoms. No chest pain, no palpitations. No sick contacts.   She is compliant with her medications.     In ED, vitals wnl  Laboratory workup significant for Hb 8.1 (at baseline) K 5.2 Cr 2.5 eGFR 18 (at baseline)   Troponin 0.02 BNP 55561  Chest X-ray: congestion (unofficial read)   She received 1 dose of IV Lasix 40 mg in ED.  (10 Sep 2022 20:44)      PAST MEDICAL & SURGICAL HISTORY  Chronic kidney disease    Pacemaker    Hypertension    Gout    Diverticulitis  sigmoid    Diastolic heart failure    Rheumatoid arthritis    DVT, lower extremity  Bilateral    Artificial pacemaker    History of appendectomy    History of tonsillectomy    History of hysterectomy    H/O hernia repair      No pertinent family history of premature CAD in first degree relatives  Denies EtOH, smoking or drug use    ALLERGIES:  Keflex (Swelling)      MEDICATIONS:  ALBUTerol    90 MICROgram(s) HFA Inhaler 1 Puff(s) Inhalation once  albuterol/ipratropium for Nebulization 3 milliLiter(s) Nebulizer every 6 hours  apixaban 2.5 milliGRAM(s) Oral two times a day  atorvastatin 20 milliGRAM(s) Oral at bedtime  calcium acetate 667 milliGRAM(s) Oral three times a day with meals  folic acid 1 milliGRAM(s) Oral daily  furosemide   Injectable 40 milliGRAM(s) IV Push two times a day  hydrALAZINE 25 milliGRAM(s) Oral three times a day  isosorbide   mononitrate ER Tablet (IMDUR) 60 milliGRAM(s) Oral daily  latanoprost 0.005% Ophthalmic Solution 1 Drop(s) Both EYES <User Schedule>  lidocaine   4% Patch 1 Patch Transdermal daily    PRN:  ALBUTerol    0.083%. 2.5 milliGRAM(s) Nebulizer once PRN      Home Medications:  amLODIPine 5 mg oral tablet: 1 tab(s) orally once a day (10 Sep 2022 21:37)  apixaban 2.5 mg oral tablet: 1 tab(s) orally 2 times a day (10 Sep 2022 21:37)  calcium (as carbonate) 600 mg oral tablet: 1 tab(s) orally 3 times a day (10 Sep 2022 21:37)  calcium acetate 667 mg oral capsule: 2 cap(s) orally 3 times a day (with meals) (10 Sep 2022 21:37)  folic acid 1 mg oral tablet: 1 tab(s) orally once a day (10 Sep 2022 21:37)  furosemide 40 mg oral tablet: 1 tab(s) orally once a day (10 Sep 2022 21:37)  hydrALAZINE 25 mg oral tablet: 1 tab(s) orally 3 times a day (10 Sep 2022 21:37)  isosorbide mononitrate 60 mg oral tablet, extended release: 1 tab(s) orally once a day (in the morning) (10 Sep 2022 21:37)  latanoprost 0.005% ophthalmic solution: 1 drop(s) to each affected eye 2 times a day (10 Sep 2022 21:37)  Lipitor 20 mg oral tablet: 1 tab(s) orally once a day (at bedtime) (10 Sep 2022 21:37)      VITALS:   T(F): 98.2 (09-11 @ 16:14), Max: 98.4 (09-10 @ 15:31)  HR: 89 (09-11 @ 16:14) (66 - 89)  BP: 144/77 (09-11 @ 16:14) (135/68 - 181/88)  BP(mean): --  RR: 18 (09-11 @ 16:14) (17 - 18)  SpO2: 100% (09-11 @ 16:14) (96% - 100%)      REVIEW OF SYSTEMS:  CONSTITUTIONAL: No fever, weight loss, fatigue  NECK: No pain or stiffness  RESPIRATORY: See HPI  CARDIOVASCULAR: See HPI  GASTROINTESTINAL: No abdominal/epigastric pain, nausea, vomiting, hematemesis, diarrhea, constipation, melena or hematochezia  GENITOURINARY: No dysuria, frequency, hematuria, incontinence  NEUROLOGICAL: No headaches, memory loss, loss of strength, numbness, tremors  SKIN: No itching, burning, rashes, lesions   ENDOCRINE: No heat/cold intolerance or hair loss  MUSCULOSKELETAL: No joint pain or swelling  HEME/LYMPH: No easy bruising or bleeding gums    PHYSICAL EXAM:  General: Not in distress.  Non-toxic appearing.   HEENT: EOMI  Cardio: regular, S1, S2, no murmur  Pulm: Decreased BS bilaterally  Abdomen: Soft, non-tender, non-distended  Extremities: b/l LE edema  Neuro: A&O x3. No focal deficits      LABS:                        10.7   10.66 )-----------( 181      ( 11 Sep 2022 06:27 )             33.5     09-11    138  |  104  |  50<H>  ----------------------------<  108<H>  5.5<H>   |  22  |  2.3<H>    Ca    9.0      11 Sep 2022 06:27  Phos  3.4     09-11  Mg     1.8     09-11    TPro  7.0  /  Alb  3.9  /  TBili  0.4  /  DBili  x   /  AST  35  /  ALT  14  /  AlkPhos  152<H>  09-11      Troponin T, Serum: 0.02 ng/mL *H* (09-11-22 @ 06:27)  Troponin T, Serum: 0.02 ng/mL *H* (09-11-22 @ 00:31)  Troponin T, Serum: 0.02 ng/mL *H* (09-10-22 @ 16:30)    Serum Pro-Brain Natriuretic Peptide: 74550 pg/mL (09-10-22 @ 16:30)          12 Lead ECG:   Ventricular Rate 91 BPM    Atrial Rate 91 BPM    P-R Interval 144 ms    QRS Duration 128 ms    Q-T Interval 384 ms    QTC Calculation(Bazett) 472 ms    P Axis 58 degrees    R Axis 2 degrees    T Axis 176 degrees    Diagnosis Line Normal sinus rhythm  Non-specific intra-ventricular conduction block  T wave abnormality, consider inferolateral ischemia  Abnormal ECG      TELEMETRY EVENTS: NSR @70 bpm      < from: TTE Echo Complete w/o Contrast w/ Doppler (03.07.22 @ 09:56) >  Summary:   1. Left ventricular ejection fraction, by visual estimation, is 25 to   30%.   2. Moderately to severely decreased global left ventricular systolic   function.   3. Spectral Doppler shows impaired relaxation pattern of left   ventricular myocardial filling (Grade I diastolic dysfunction).   4. Moderately enlarged left atrium.   5. Normal right atrial size.   6. Mild thickening of the anterior and posterior mitral valve leaflets.   7. Moderate mitral valve regurgitation.   8. Severe mitral annular calcification.   9. Moderate tricuspid regurgitation.  10. PSAP 40.  11. Mild pulmonic valve regurgitation.        < from: NM Nuclear Stress Pharmacologic Multiple (06.22.20 @ 11:26) >  Impression:   1. NORMAL ADENOSINE / REST MYOCARDIAL PERFUSION SPECT TOMOGRAPHY, WITH NO EVIDENCE FOR ISCHEMIA DURING ADENOSINE INFUSION.   2. NORMAL RESTING LEFT VENTRICULAR WALL MOTION AND WALL THICKENING.  3. LEFT VENTRICULAR EJECTION FRACTION OF  56 % WHICH IS WITHIN RANGE OF NORMAL.     < end of copied text >     HPI:  86 year old patient ([employer]), known to have HFrEF (Echo 3/2022: EF 25-30%) s/p AICD, pulm HTN, provoked PE/DVT on Eliquis, DLD, CKD 4, RA and glaucoma, presented to ED with SOB and worsening LE edema of 1 day duration. Associated with orthopnea.    Patient also reports worsening pain in her bilateral shoulders and knees. To note patient was recently admitted on 7/2022 for bilateral shoulder pain.   She denies fever, chills, headache, upper respiratory symptoms, abdominal or urinary symptoms. No chest pain, no palpitations. No sick contacts.   She is compliant with her medications.     In ED, vitals wnl  Laboratory workup significant for Hb 8.1 (at baseline) K 5.2 Cr 2.5 eGFR 18 (at baseline)   Troponin 0.02 BNP 96008  Chest X-ray: congestion (unofficial read)   She received 1 dose of IV Lasix 40 mg in ED.  (10 Sep 2022 20:44)      PAST MEDICAL & SURGICAL HISTORY  Chronic kidney disease    Pacemaker    Hypertension    Gout    Diverticulitis  sigmoid    Diastolic heart failure    Rheumatoid arthritis    DVT, lower extremity  Bilateral    Artificial pacemaker    History of appendectomy    History of tonsillectomy    History of hysterectomy    H/O hernia repair      No pertinent family history of premature CAD in first degree relatives  Denies EtOH, smoking or drug use    ALLERGIES:  Keflex (Swelling)      MEDICATIONS:  ALBUTerol    90 MICROgram(s) HFA Inhaler 1 Puff(s) Inhalation once  albuterol/ipratropium for Nebulization 3 milliLiter(s) Nebulizer every 6 hours  apixaban 2.5 milliGRAM(s) Oral two times a day  atorvastatin 20 milliGRAM(s) Oral at bedtime  calcium acetate 667 milliGRAM(s) Oral three times a day with meals  folic acid 1 milliGRAM(s) Oral daily  furosemide   Injectable 40 milliGRAM(s) IV Push two times a day  hydrALAZINE 25 milliGRAM(s) Oral three times a day  isosorbide   mononitrate ER Tablet (IMDUR) 60 milliGRAM(s) Oral daily  latanoprost 0.005% Ophthalmic Solution 1 Drop(s) Both EYES <User Schedule>  lidocaine   4% Patch 1 Patch Transdermal daily    PRN:  ALBUTerol    0.083%. 2.5 milliGRAM(s) Nebulizer once PRN      Home Medications:  amLODIPine 5 mg oral tablet: 1 tab(s) orally once a day (10 Sep 2022 21:37)  apixaban 2.5 mg oral tablet: 1 tab(s) orally 2 times a day (10 Sep 2022 21:37)  calcium (as carbonate) 600 mg oral tablet: 1 tab(s) orally 3 times a day (10 Sep 2022 21:37)  calcium acetate 667 mg oral capsule: 2 cap(s) orally 3 times a day (with meals) (10 Sep 2022 21:37)  folic acid 1 mg oral tablet: 1 tab(s) orally once a day (10 Sep 2022 21:37)  furosemide 40 mg oral tablet: 1 tab(s) orally once a day (10 Sep 2022 21:37)  hydrALAZINE 25 mg oral tablet: 1 tab(s) orally 3 times a day (10 Sep 2022 21:37)  isosorbide mononitrate 60 mg oral tablet, extended release: 1 tab(s) orally once a day (in the morning) (10 Sep 2022 21:37)  latanoprost 0.005% ophthalmic solution: 1 drop(s) to each affected eye 2 times a day (10 Sep 2022 21:37)  Lipitor 20 mg oral tablet: 1 tab(s) orally once a day (at bedtime) (10 Sep 2022 21:37)      VITALS:   T(F): 98.2 (09-11 @ 16:14), Max: 98.4 (09-10 @ 15:31)  HR: 89 (09-11 @ 16:14) (66 - 89)  BP: 144/77 (09-11 @ 16:14) (135/68 - 181/88)  BP(mean): --  RR: 18 (09-11 @ 16:14) (17 - 18)  SpO2: 100% (09-11 @ 16:14) (96% - 100%)      REVIEW OF SYSTEMS:  CONSTITUTIONAL: No fever, weight loss, fatigue  NECK: No pain or stiffness  RESPIRATORY: See HPI  CARDIOVASCULAR: See HPI  GASTROINTESTINAL: No abdominal/epigastric pain, nausea, vomiting, hematemesis, diarrhea, constipation, melena or hematochezia  GENITOURINARY: No dysuria, frequency, hematuria, incontinence  NEUROLOGICAL: No headaches, memory loss, loss of strength, numbness, tremors  SKIN: No itching, burning, rashes, lesions   ENDOCRINE: No heat/cold intolerance or hair loss  MUSCULOSKELETAL: No joint pain or swelling  HEME/LYMPH: No easy bruising or bleeding gums    PHYSICAL EXAM:  General: Not in distress.  Non-toxic appearing.   HEENT: EOMI  Cardio: regular, S1, S2, no murmur  Pulm: Decreased BS bilaterally  Abdomen: Soft, non-tender, non-distended  Extremities: b/l LE edema  Neuro: A&O x3. No focal deficits      LABS:                        10.7   10.66 )-----------( 181      ( 11 Sep 2022 06:27 )             33.5     09-11    138  |  104  |  50<H>  ----------------------------<  108<H>  5.5<H>   |  22  |  2.3<H>    Ca    9.0      11 Sep 2022 06:27  Phos  3.4     09-11  Mg     1.8     09-11    TPro  7.0  /  Alb  3.9  /  TBili  0.4  /  DBili  x   /  AST  35  /  ALT  14  /  AlkPhos  152<H>  09-11      Troponin T, Serum: 0.02 ng/mL *H* (09-11-22 @ 06:27)  Troponin T, Serum: 0.02 ng/mL *H* (09-11-22 @ 00:31)  Troponin T, Serum: 0.02 ng/mL *H* (09-10-22 @ 16:30)    Serum Pro-Brain Natriuretic Peptide: 75777 pg/mL (09-10-22 @ 16:30)        12 Lead ECG:   Ventricular Rate 91 BPM    Atrial Rate 91 BPM    P-R Interval 144 ms    QRS Duration 128 ms    Q-T Interval 384 ms    QTC Calculation(Bazett) 472 ms    P Axis 58 degrees    R Axis 2 degrees    T Axis 176 degrees    Diagnosis Line Normal sinus rhythm  Non-specific intra-ventricular conduction block  T wave abnormality, consider inferolateral ischemia  Abnormal ECG      TELEMETRY EVENTS: NSR @70 bpm      < from: TTE Echo Complete w/o Contrast w/ Doppler (03.07.22 @ 09:56) >  Summary:   1. Left ventricular ejection fraction, by visual estimation, is 25 to   30%.   2. Moderately to severely decreased global left ventricular systolic   function.   3. Spectral Doppler shows impaired relaxation pattern of left   ventricular myocardial filling (Grade I diastolic dysfunction).   4. Moderately enlarged left atrium.   5. Normal right atrial size.   6. Mild thickening of the anterior and posterior mitral valve leaflets.   7. Moderate mitral valve regurgitation.   8. Severe mitral annular calcification.   9. Moderate tricuspid regurgitation.  10. PSAP 40.  11. Mild pulmonic valve regurgitation.        < from: NM Nuclear Stress Pharmacologic Multiple (06.22.20 @ 11:26) >  Impression:   1. NORMAL ADENOSINE / REST MYOCARDIAL PERFUSION SPECT TOMOGRAPHY, WITH NO EVIDENCE FOR ISCHEMIA DURING ADENOSINE INFUSION.   2. NORMAL RESTING LEFT VENTRICULAR WALL MOTION AND WALL THICKENING.  3. LEFT VENTRICULAR EJECTION FRACTION OF  56 % WHICH IS WITHIN RANGE OF NORMAL.     < end of copied text >

## 2022-09-11 NOTE — PROGRESS NOTE ADULT - SUBJECTIVE AND OBJECTIVE BOX
Pt seen and examined at bedside. No CP or SOB.          PAST MEDICAL & SURGICAL HISTORY:  Chronic kidney disease    Pacemaker    Hypertension    Gout    Diverticulitis  sigmoid    Diastolic heart failure    Rheumatoid arthritis    DVT, lower extremity  Bilateral    Artificial pacemaker    History of appendectomy    History of tonsillectomy    History of hysterectomy    H/O hernia repair        VITAL SIGNS (Last 24 hrs):  T(C): 36.8 (09-11-22 @ 09:38), Max: 36.9 (09-10-22 @ 15:31)  HR: 80 (09-11-22 @ 09:38) (66 - 83)  BP: 174/92 (09-11-22 @ 09:38) (135/68 - 181/88)  RR: 18 (09-11-22 @ 09:38) (17 - 18)  SpO2: 99% (09-11-22 @ 09:38) (96% - 100%)  Wt(kg): --  Daily Height in cm: 160.02 (10 Sep 2022 15:31)    Daily     I&O's Summary      PHYSICAL EXAM:  GENERAL: NAD, well-developed  HEAD:  Atraumatic, Normocephalic  EYES: EOMI, PERRLA, conjunctiva and sclera clear  NECK: Supple, No JVD  CHEST/LUNG: Clear to auscultation bilaterally; No wheeze  HEART: Regular rate and rhythm; No murmurs, rubs, or gallops  ABDOMEN: Soft, Nontender, Nondistended; Bowel sounds present  EXTREMITIES:  2+ Peripheral Pulses, No clubbing, cyanosis, or edema  PSYCH: AAOx3  NEUROLOGY: non-focal  SKIN: No rashes or lesions    Labs Reviewed  Spoke to patient in regards to abnormal labs.    CBC Full  -  ( 11 Sep 2022 06:27 )  WBC Count : 10.66 K/uL  Hemoglobin : 10.7 g/dL  Hematocrit : 33.5 %  Platelet Count - Automated : 181 K/uL  Mean Cell Volume : 90.5 fL  Mean Cell Hemoglobin : 28.9 pg  Mean Cell Hemoglobin Concentration : 31.9 g/dL  Auto Neutrophil # : 7.82 K/uL  Auto Lymphocyte # : 1.64 K/uL  Auto Monocyte # : 1.01 K/uL  Auto Eosinophil # : 0.12 K/uL  Auto Basophil # : 0.03 K/uL  Auto Neutrophil % : 73.3 %  Auto Lymphocyte % : 15.4 %  Auto Monocyte % : 9.5 %  Auto Eosinophil % : 1.1 %  Auto Basophil % : 0.3 %    BMP:    09-11 @ 06:27    Blood Urea Nitrogen - 50  Calcium - 9.0  Carbond Dioxide - 22  Chloride - 104  Creatinine - 2.3  Glucose - 108  Potassium - 5.5  Sodium - 138      Hemoglobin A1c -     Urine Culture:        COVID Labs  CRP:      D-Dimer:  348 ng/mL DDU (09-11-22 @ 00:31)            Imaging reviewed independently and reviewed read    < from: [facility] Duplex Lower Ext Vein Scan, Bilat (09.11.22 @ 09:17) >  Impression:    No evidence of deep venous thrombosis or superficial thrombophlebitis in   the bilateral lower extremities.    < end of copied text >    < from: TTE Echo Complete w/o Contrast w/ Doppler (03.07.22 @ 09:56) >  Summary:   1. Left ventricular ejection fraction, by visual estimation, is 25 to   30%.   2. Moderately to severely decreased global left ventricular systolic   function.   3. Spectral Doppler shows impaired relaxation pattern of left   ventricular myocardial filling (Grade I diastolic dysfunction).   4. Moderately enlarged leftatrium.   5. Normal right atrial size.   6. Mild thickening of the anterior and posterior mitral valve leaflets.   7. Moderate mitral valve regurgitation.   8. Severe mitral annular calcification.   9. Moderate tricuspid regurgitation.  10. PSAP 40.  11. Mild pulmonic valve regurgitation.    < end of copied text >  < from: Xray Chest 1 View- PORTABLE-Urgent (Xray Chest 1 View- PORTABLE-Urgent .) (09.11.22 @ 01:58) >  Impression:    No radiographic evidence of acute cardiopulmonary disease.    < end of copied text >    < from: 12 Lead ECG (09.11.22 @ 00:07) >  Diagnosis Line Normal sinus rhythm  Non-specific intra-ventricular conduction block  T wave abnormality, consider inferolateral ischemia  Abnormal ECG    < end of copied text >      MEDICATIONS  (STANDING):  ALBUTerol    90 MICROgram(s) HFA Inhaler 1 Puff(s) Inhalation once  albuterol/ipratropium for Nebulization 3 milliLiter(s) Nebulizer every 6 hours  apixaban 2.5 milliGRAM(s) Oral two times a day  atorvastatin 20 milliGRAM(s) Oral at bedtime  calcium acetate 667 milliGRAM(s) Oral three times a day with meals  folic acid 1 milliGRAM(s) Oral daily  furosemide   Injectable 40 milliGRAM(s) IV Push two times a day  hydrALAZINE 25 milliGRAM(s) Oral three times a day  isosorbide   mononitrate ER Tablet (IMDUR) 60 milliGRAM(s) Oral daily  latanoprost 0.005% Ophthalmic Solution 1 Drop(s) Both EYES <User Schedule>  lidocaine   4% Patch 1 Patch Transdermal daily    MEDICATIONS  (PRN):  ALBUTerol    0.083%. 2.5 milliGRAM(s) Nebulizer once PRN Shortness of Breath and/or Wheezing

## 2022-09-11 NOTE — PHYSICAL THERAPY INITIAL EVALUATION ADULT - TRANSFER SAFETY CONCERNS NOTED: SIT/STAND, REHAB EVAL
B knees 0-60 degrees flexion only. Requires assiastnce to get up for chair due inadequate flexion of B knees./decreased sequencing ability

## 2022-09-11 NOTE — PHYSICAL THERAPY INITIAL EVALUATION ADULT - GAIT DEVIATIONS NOTED, PT EVAL
Slow guarded gait/increased time in double stance/decreased step length/decreased stride length/increased stride width

## 2022-09-11 NOTE — PROGRESS NOTE ADULT - ASSESSMENT
#Acute decompensation of HFrEF/ICM in the setting of uncontrolled hypertension      - C/w IV lasix 40mg q12hr  - C/w Nifedipine 60, HCTZ 25. Can increase Nifedipine to 90 for tighter BP control  - Cr 2.5 (at baseline). Monitor urine output  - Repeat CXR tmrw AM  - Avoid ACE/ARB/ENtrsto as caused pt severe hyperkalemia in the past  - BMP q12hr #Acute decompensation of HFrEF/ICM in the setting of uncontrolled hypertension      - C/w IV lasix 40mg q12hr  - C/w Nifedipine 60, HCTZ 25. Can increase Nifedipine to 90 for tighter BP control.  - Cr 2.5 (at baseline). Monitor urine output  - Repeat CXR tmrw AM  - Avoid ACE/ARB/ENtrsto as caused pt severe hyperkalemia in the past #Acute decompensation of HFrEF/ICM in the setting of uncontrolled hypertension      - C/w IV lasix 40mg q12hr  - C/w Nifedipine 60, HCTZ 25. Can increase Nifedipine to 90 for tighter BP control.  - Repeat CXR tmrw AM  - Avoid ACE/ARB/Entresto as caused pt severe hyperkalemia in the past

## 2022-09-11 NOTE — PHYSICAL THERAPY INITIAL EVALUATION ADULT - PERTINENT HX OF CURRENT PROBLEM, REHAB EVAL
86 year old patient ([employer]), known to hav  HFrEF (Echo 3/2022: EF 25-30%) s/p AICD, pulm HTN, provoked PE/DVT on Eliquis, DLD, CKD 4, RA and glaucoma, presented to ED with SOB and worsening LE edema of 1 day duration. Associated with orthopnea.   Patient also reports worsening pain in her bilateral shoulders and knees. To note patient was recently admitted on 7/2022 for bilateral shoulder pain.   She denies fever, chills, headache, upper respiratory symptoms, abdominal or urinary symptoms. No chest pain, no palpitations. No sick contacts.

## 2022-09-11 NOTE — PROGRESS NOTE ADULT - ASSESSMENT
86 year old patient ([employer]), known to have HFrEF (Echo 3/2022: EF 25-30%) s/p AICD, HTN, provoked PE/DVT on Eliquis, DLD, CKD 4 and glaucoma, presented to ED with SOB of duration. Admitted for further management of HF exacerbation.     #Acute on CHF exacerbation 2/2 HFrEF likley 2/2 HTN emergency   - In ED, vitals wnl  - Laboratory workup significant for Troponin 0.02 BNP 94608  - Chest X-ray: congestion (unofficial read)   - s/p 1 dose of IV Lasix 40 mg in ED  - Echo 3/2022: EF 25-30%  - Repeat Echo  - continue lasix 40 mg IV BID   - c/w Statin and Imdur   - No ACE inhibitors/ ARB/ Entresto/ spironolactone since patient can become severely hyperkalemic as per cardiology   - Cardiology consulted   - strict i/o   - started on procardia 60 mg XL on 9/12, given amlodipine 10 mg x 1     #B/L shoulder pain  #B/L knee pain   - Previous shoulder xray shows degenerative changes of the glenohumeral and acromioclavicular joints noted.  - Previous labs are significant for elevated uric acid and inflammatory markers, negative cyclic citrullinated peptide   - Patient was evaluated by rheumatology in the past  - c/w with pain control PRN, warm compress  - Follow B/L knee X-rays   - PT/OT    #Anemia  - Hb 8.1 (at baseline)  - s/p EGD and colonoscopy in 2020 with non-specific gastritis and Diverticulosis - recommendation was to repeat colonoscopy in 1 year, which she did not   - NO BLOOD TRANSFUSIONS AS PER PT WISHES, JEHOVA'S WITNESS  - Monitor H+H  - Keep active T+S     #Troponemia in the setting of CKD4   - Troponin 0.02 (around baseline)  - F/u repeat troponin       #CKD stage 4  - Cr 2.5 eGFR 18 (at baseline)   - Follows Dr. Urrutia   - Avoid nephrotoxic agents   - c/w calcium acetate     #hyperkalemia- 5.5- lokelma 10 mg x1     #HTN  - continue Hydralazine   - switched to procardia starting 9/12  - NO ACE inhibitors/ ARB/ Entresto/ spironolactone since patient can become severely hyperkalemic     #Hx of PE/DVT  - Hx of provoked PE in the past and soleal vein thrombosis years ago?  - c/w Eliquis 2.5 mg bid     #HDL  - c/w atorvastatin 20    #Glaucoma  - c/w eye drops    #Progress Note Handoff  Pending (specify):  chf management echo, cardiology, bp  Disposition: home vs snf, PT  Anticipated date: 9/13

## 2022-09-11 NOTE — PROGRESS NOTE ADULT - ATTENDING COMMENTS
Acute on chronic HFrEF  NICM 25-30% s/p ICD    - IV diuresis    Incomplete Acute on chronic HFrEF 25-30%  NICM s/p ICD  Uncontrolled HTN  CKD 4      - Plan as above  - Continue IV diuresis for 24-48 hrs  - Change to Torsemide 20 mg daily on discharge Acute on Chronic HFrEF 25-30%  NICM s/p ICD  Uncontrolled HTN  CKD 4      - Plan as above  - Continue IV diuresis for 24-48 hrs  - Change to Torsemide 20 mg daily on discharge

## 2022-09-12 ENCOUNTER — TRANSCRIPTION ENCOUNTER (OUTPATIENT)
Age: 87
End: 2022-09-12

## 2022-09-12 LAB
ALBUMIN SERPL ELPH-MCNC: 3 G/DL — LOW (ref 3.5–5.2)
ALP SERPL-CCNC: 116 U/L — HIGH (ref 30–115)
ALT FLD-CCNC: 11 U/L — SIGNIFICANT CHANGE UP (ref 0–41)
ANION GAP SERPL CALC-SCNC: 15 MMOL/L — HIGH (ref 7–14)
AST SERPL-CCNC: 23 U/L — SIGNIFICANT CHANGE UP (ref 0–41)
BASOPHILS # BLD AUTO: 0.02 K/UL — SIGNIFICANT CHANGE UP (ref 0–0.2)
BASOPHILS NFR BLD AUTO: 0.3 % — SIGNIFICANT CHANGE UP (ref 0–1)
BILIRUB SERPL-MCNC: 0.3 MG/DL — SIGNIFICANT CHANGE UP (ref 0.2–1.2)
BUN SERPL-MCNC: 55 MG/DL — HIGH (ref 10–20)
CALCIUM SERPL-MCNC: 8.6 MG/DL — SIGNIFICANT CHANGE UP (ref 8.4–10.4)
CHLORIDE SERPL-SCNC: 99 MMOL/L — SIGNIFICANT CHANGE UP (ref 98–110)
CO2 SERPL-SCNC: 22 MMOL/L — SIGNIFICANT CHANGE UP (ref 17–32)
CREAT SERPL-MCNC: 2.8 MG/DL — HIGH (ref 0.7–1.5)
EGFR: 16 ML/MIN/1.73M2 — LOW
EOSINOPHIL # BLD AUTO: 0.2 K/UL — SIGNIFICANT CHANGE UP (ref 0–0.7)
EOSINOPHIL NFR BLD AUTO: 2.6 % — SIGNIFICANT CHANGE UP (ref 0–8)
GLUCOSE SERPL-MCNC: 172 MG/DL — HIGH (ref 70–99)
HCT VFR BLD CALC: 28.9 % — LOW (ref 37–47)
HGB BLD-MCNC: 9.5 G/DL — LOW (ref 12–16)
IMM GRANULOCYTES NFR BLD AUTO: 0.4 % — HIGH (ref 0.1–0.3)
LYMPHOCYTES # BLD AUTO: 1.68 K/UL — SIGNIFICANT CHANGE UP (ref 1.2–3.4)
LYMPHOCYTES # BLD AUTO: 22 % — SIGNIFICANT CHANGE UP (ref 20.5–51.1)
MAGNESIUM SERPL-MCNC: 1.8 MG/DL — SIGNIFICANT CHANGE UP (ref 1.8–2.4)
MCHC RBC-ENTMCNC: 29.3 PG — SIGNIFICANT CHANGE UP (ref 27–31)
MCHC RBC-ENTMCNC: 32.9 G/DL — SIGNIFICANT CHANGE UP (ref 32–37)
MCV RBC AUTO: 89.2 FL — SIGNIFICANT CHANGE UP (ref 81–99)
MONOCYTES # BLD AUTO: 0.97 K/UL — HIGH (ref 0.1–0.6)
MONOCYTES NFR BLD AUTO: 12.7 % — HIGH (ref 1.7–9.3)
NEUTROPHILS # BLD AUTO: 4.75 K/UL — SIGNIFICANT CHANGE UP (ref 1.4–6.5)
NEUTROPHILS NFR BLD AUTO: 62 % — SIGNIFICANT CHANGE UP (ref 42.2–75.2)
NRBC # BLD: 0 /100 WBCS — SIGNIFICANT CHANGE UP (ref 0–0)
PLATELET # BLD AUTO: 172 K/UL — SIGNIFICANT CHANGE UP (ref 130–400)
POTASSIUM SERPL-MCNC: 4.5 MMOL/L — SIGNIFICANT CHANGE UP (ref 3.5–5)
POTASSIUM SERPL-SCNC: 4.5 MMOL/L — SIGNIFICANT CHANGE UP (ref 3.5–5)
PROT SERPL-MCNC: 6 G/DL — SIGNIFICANT CHANGE UP (ref 6–8)
RBC # BLD: 3.24 M/UL — LOW (ref 4.2–5.4)
RBC # FLD: 14.2 % — SIGNIFICANT CHANGE UP (ref 11.5–14.5)
SODIUM SERPL-SCNC: 136 MMOL/L — SIGNIFICANT CHANGE UP (ref 135–146)
TROPONIN T SERPL-MCNC: 0.03 NG/ML — CRITICAL HIGH
WBC # BLD: 7.65 K/UL — SIGNIFICANT CHANGE UP (ref 4.8–10.8)
WBC # FLD AUTO: 7.65 K/UL — SIGNIFICANT CHANGE UP (ref 4.8–10.8)

## 2022-09-12 PROCEDURE — 93283 PRGRMG EVAL IMPLANTABLE DFB: CPT | Mod: 26

## 2022-09-12 PROCEDURE — 93010 ELECTROCARDIOGRAM REPORT: CPT

## 2022-09-12 PROCEDURE — 99233 SBSQ HOSP IP/OBS HIGH 50: CPT

## 2022-09-12 PROCEDURE — 93306 TTE W/DOPPLER COMPLETE: CPT | Mod: 26

## 2022-09-12 RX ORDER — HYDRALAZINE HCL 50 MG
10 TABLET ORAL THREE TIMES A DAY
Refills: 0 | Status: DISCONTINUED | OUTPATIENT
Start: 2022-09-12 | End: 2022-09-12

## 2022-09-12 RX ORDER — FUROSEMIDE 40 MG
40 TABLET ORAL ONCE
Refills: 0 | Status: COMPLETED | OUTPATIENT
Start: 2022-09-12 | End: 2022-09-12

## 2022-09-12 RX ORDER — ACETAMINOPHEN 500 MG
650 TABLET ORAL EVERY 6 HOURS
Refills: 0 | Status: DISCONTINUED | OUTPATIENT
Start: 2022-09-12 | End: 2022-09-15

## 2022-09-12 RX ORDER — HYDRALAZINE HCL 50 MG
10 TABLET ORAL THREE TIMES A DAY
Refills: 0 | Status: DISCONTINUED | OUTPATIENT
Start: 2022-09-12 | End: 2022-09-15

## 2022-09-12 RX ORDER — BENZOCAINE 10 %
1 GEL (GRAM) MUCOUS MEMBRANE THREE TIMES A DAY
Refills: 0 | Status: DISCONTINUED | OUTPATIENT
Start: 2022-09-12 | End: 2022-09-15

## 2022-09-12 RX ADMIN — ISOSORBIDE MONONITRATE 60 MILLIGRAM(S): 60 TABLET, EXTENDED RELEASE ORAL at 11:58

## 2022-09-12 RX ADMIN — ATORVASTATIN CALCIUM 20 MILLIGRAM(S): 80 TABLET, FILM COATED ORAL at 22:15

## 2022-09-12 RX ADMIN — Medication 60 MILLIGRAM(S): at 08:34

## 2022-09-12 RX ADMIN — Medication 650 MILLIGRAM(S): at 14:04

## 2022-09-12 RX ADMIN — LIDOCAINE 1 PATCH: 4 CREAM TOPICAL at 18:04

## 2022-09-12 RX ADMIN — LIDOCAINE 1 PATCH: 4 CREAM TOPICAL at 00:00

## 2022-09-12 RX ADMIN — Medication 667 MILLIGRAM(S): at 18:03

## 2022-09-12 RX ADMIN — Medication 40 MILLIGRAM(S): at 18:03

## 2022-09-12 RX ADMIN — Medication 10 MILLIGRAM(S): at 13:46

## 2022-09-12 RX ADMIN — Medication 3 MILLILITER(S): at 21:33

## 2022-09-12 RX ADMIN — Medication 667 MILLIGRAM(S): at 11:58

## 2022-09-12 RX ADMIN — LIDOCAINE 1 PATCH: 4 CREAM TOPICAL at 23:05

## 2022-09-12 RX ADMIN — APIXABAN 2.5 MILLIGRAM(S): 2.5 TABLET, FILM COATED ORAL at 06:15

## 2022-09-12 RX ADMIN — LIDOCAINE 1 PATCH: 4 CREAM TOPICAL at 11:58

## 2022-09-12 RX ADMIN — Medication 3 MILLILITER(S): at 13:46

## 2022-09-12 RX ADMIN — Medication 40 MILLIGRAM(S): at 08:35

## 2022-09-12 RX ADMIN — APIXABAN 2.5 MILLIGRAM(S): 2.5 TABLET, FILM COATED ORAL at 18:03

## 2022-09-12 RX ADMIN — LATANOPROST 1 DROP(S): 0.05 SOLUTION/ DROPS OPHTHALMIC; TOPICAL at 06:16

## 2022-09-12 RX ADMIN — Medication 667 MILLIGRAM(S): at 08:35

## 2022-09-12 RX ADMIN — LATANOPROST 1 DROP(S): 0.05 SOLUTION/ DROPS OPHTHALMIC; TOPICAL at 18:04

## 2022-09-12 RX ADMIN — Medication 650 MILLIGRAM(S): at 14:32

## 2022-09-12 RX ADMIN — Medication 1 MILLIGRAM(S): at 11:58

## 2022-09-12 NOTE — DISCHARGE NOTE NURSING/CASE MANAGEMENT/SOCIAL WORK - PATIENT PORTAL LINK FT
You can access the FollowMyHealth Patient Portal offered by [facility] by registering at the following website: [email or web address]. By joining [employer]’s FollowMyHealth portal, you will also be able to view your health information using other applications (apps) compatible with our system.

## 2022-09-12 NOTE — PATIENT PROFILE ADULT - BRAND OF COVID-19 VACCINATION
Impression: Other specified postprocedural states: Z98.890. Plan: Hx of LASIK OU. Flaps clear and centered OU. Monovision (OD: distance and OS: near). Other

## 2022-09-12 NOTE — PATIENT PROFILE ADULT - FUNCTIONAL ASSESSMENT - BASIC MOBILITY 6.
1-calculated by average/Not able to assess (calculate score using [facility] averaging method)

## 2022-09-12 NOTE — DISCHARGE NOTE NURSING/CASE MANAGEMENT/SOCIAL WORK - NSDCPEFALRISK_GEN_ALL_CORE
For information on Fall & Injury Prevention, visit: [email or web address] OR  [email or web address] OR  https://www.cdc.gov/steadi/patient.html

## 2022-09-12 NOTE — CHART NOTE - NSCHARTNOTEFT_GEN_A_CORE
Patient had worsening shortness of breath and cough. She was wheezing and desating to the low 90s while on 3L NC. Her lungs had wheezing and rhonchi on auscultation. She endorsed right sided sharp chest pain that was worse with inspiration. She was given duonebs, and lasix 40. Trops, dimer, and lactate were drawn. Chest xray and EKG were ordered.
spoke with patient's daughter Leona and updated her on patient's current condition. Daughter mentioned patient had AICD placed recently and was supposed to have 3 leads; however, one of the leads still needs to be placed and as per her cardiologist it could be the cause of her fluid retention. Patient was scheduled to follow up with Dr. Blakely regarding the placement of that third lead as per the daughter. Told daughter EP is consulted to interrogate patient's AICD and there's an echo pending for her and if everything is okay patient is anticipated to be discharged tomorrow.
Electrophysiology    Pts device ______ was interrogated on 09-12-22  Device working properly  epicardial LV lead is disabled   <0.1%, AP-VS 3.7%  Events: no events  Optivol under threshold   Preliminary results d/w with primary team  Awaiting / Reviewed by attending    Contact EP ACP with any questions [phone]

## 2022-09-12 NOTE — PROGRESS NOTE ADULT - SUBJECTIVE AND OBJECTIVE BOX
Pt seen and examined at bedside. No CP or SOB. Pt feeling better today           PAST MEDICAL & SURGICAL HISTORY:  Chronic kidney disease    Pacemaker    Hypertension    Gout    Diverticulitis  sigmoid    Diastolic heart failure    Rheumatoid arthritis    DVT, lower extremity  Bilateral    Artificial pacemaker    History of appendectomy    History of tonsillectomy    History of hysterectomy    H/O hernia repair        VITAL SIGNS (Last 24 hrs):  T(C): 37.1 (09-12-22 @ 12:04), Max: 37.4 (09-12-22 @ 08:10)  HR: 86 (09-12-22 @ 12:04) (73 - 89)  BP: 102/53 (09-12-22 @ 12:04) (88/50 - 144/77)  RR: 18 (09-12-22 @ 12:04) (18 - 18)  SpO2: 98% (09-12-22 @ 12:04) (97% - 100%)  Wt(kg): --  Daily     Daily     I&O's Summary      PHYSICAL EXAM:  GENERAL: NAD, well-developed  HEAD:  Atraumatic, Normocephalic  EYES: EOMI, PERRLA, conjunctiva and sclera clear  NECK: Supple, No JVD  CHEST/LUNG: Clear to auscultation bilaterally; No wheeze  HEART: Regular rate and rhythm; No murmurs, rubs, or gallops  ABDOMEN: Soft, Nontender, Nondistended; Bowel sounds present  EXTREMITIES:  2+ Peripheral Pulses, No clubbing, cyanosis, or edema  PSYCH: AAOx3  NEUROLOGY: non-focal  SKIN: No rashes or lesions    Labs Reviewed  Spoke to patient in regards to abnormal labs.    CBC Full  -  ( 12 Sep 2022 07:36 )  WBC Count : 7.65 K/uL  Hemoglobin : 9.5 g/dL  Hematocrit : 28.9 %  Platelet Count - Automated : 172 K/uL  Mean Cell Volume : 89.2 fL  Mean Cell Hemoglobin : 29.3 pg  Mean Cell Hemoglobin Concentration : 32.9 g/dL  Auto Neutrophil # : 4.75 K/uL  Auto Lymphocyte # : 1.68 K/uL  Auto Monocyte # : 0.97 K/uL  Auto Eosinophil # : 0.20 K/uL  Auto Basophil # : 0.02 K/uL  Auto Neutrophil % : 62.0 %  Auto Lymphocyte % : 22.0 %  Auto Monocyte % : 12.7 %  Auto Eosinophil % : 2.6 %  Auto Basophil % : 0.3 %    BMP:    09-12 @ 07:36    Blood Urea Nitrogen - 55  Calcium - 8.6  Carbond Dioxide - 22  Chloride - 99  Creatinine - 2.8  Glucose - 172  Potassium - 4.5  Sodium - 136      Hemoglobin A1c -     Urine Culture:        COVID Labs  CRP:      D-Dimer:  348 ng/mL DDU (09-11-22 @ 00:31)            Imaging reviewed independently and reviewed read        MEDICATIONS  (STANDING):  ALBUTerol    90 MICROgram(s) HFA Inhaler 1 Puff(s) Inhalation once  albuterol/ipratropium for Nebulization 3 milliLiter(s) Nebulizer every 6 hours  apixaban 2.5 milliGRAM(s) Oral two times a day  atorvastatin 20 milliGRAM(s) Oral at bedtime  calcium acetate 667 milliGRAM(s) Oral three times a day with meals  folic acid 1 milliGRAM(s) Oral daily  furosemide   Injectable 40 milliGRAM(s) IV Push once  hydrALAZINE 10 milliGRAM(s) Oral three times a day  isosorbide   mononitrate ER Tablet (IMDUR) 60 milliGRAM(s) Oral daily  latanoprost 0.005% Ophthalmic Solution 1 Drop(s) Both EYES <User Schedule>  lidocaine   4% Patch 1 Patch Transdermal daily  NIFEdipine XL 60 milliGRAM(s) Oral daily    MEDICATIONS  (PRN):  acetaminophen     Tablet .. 650 milliGRAM(s) Oral every 6 hours PRN Mild Pain (1 - 3), Moderate Pain (4 - 6)  ALBUTerol    0.083%. 2.5 milliGRAM(s) Nebulizer once PRN Shortness of Breath and/or Wheezing  benzocaine 20% Spray 1 Spray(s) Topical three times a day PRN sore throat

## 2022-09-12 NOTE — PROGRESS NOTE ADULT - ASSESSMENT
86 year old patient ([employer]), known to have HFrEF (Echo 3/2022: EF 25-30%) s/p AICD, HTN, provoked PE/DVT on Eliquis, DLD, CKD 4 and glaucoma, presented to ED with SOB of duration. Admitted for further management of HF exacerbation.     #Acute on CHF exacerbation 2/2 HFrEF likley 2/2 HTN emergency   - In ED, vitals wnl  - Laboratory workup significant for Troponin 0.02 BNP 41639  - Chest X-ray: congestion (unofficial read)   - s/p 1 dose of IV Lasix 40 mg in ED  - Echo 3/2022: EF 25-30%  - Repeat Echo  - continue lasix 40 mg IV BID   - c/w Statin and Imdur   - No ACE inhibitors/ ARB/ Entresto/ spironolactone since patient can become severely hyperkalemic as per cardiology   - Cardiology consulted   - strict i/o   - continue procardia 60 mg xl, decrease hydralazine to 10 mg TID with holding parameter <120  - one more dose intravenous lasix in evening, and start po torsemide 20 mg in am    #B/L shoulder pain  #B/L knee pain   - Previous shoulder xray shows degenerative changes of the glenohumeral and acromioclavicular joints noted.  - Previous labs are significant for elevated uric acid and inflammatory markers, negative cyclic citrullinated peptide   - Patient was evaluated by rheumatology in the past  - c/w with pain control PRN, warm compress  - Follow B/L knee X-rays   - PT/OT    #Anemia  - Hb 8.1 (at baseline)  - s/p EGD and colonoscopy in 2020 with non-specific gastritis and Diverticulosis - recommendation was to repeat colonoscopy in 1 year, which she did not   - NO BLOOD TRANSFUSIONS AS PER PT WISHES, JEHOVA'S WITNESS  - Monitor H+H  - Keep active T+S     #Troponemia in the setting of CKD4   - Troponin 0.02 (around baseline)  - F/u repeat troponin       #CKD stage 4  - Cr 2.5 eGFR 18 (at baseline)   - Follows Dr. Urrutia   - Avoid nephrotoxic agents   - c/w calcium acetate     #hyperkalemia- 5.5- lokelma 10 mg x1     #HTN  - continue Hydralazine   - switched to procardia starting 9/12  - NO ACE inhibitors/ ARB/ Entresto/ spironolactone since patient can become severely hyperkalemic     #Hx of PE/DVT  - Hx of provoked PE in the past and soleal vein thrombosis years ago?  - c/w Eliquis 2.5 mg bid     #HDL  - c/w atorvastatin 20    #Glaucoma  - c/w eye drops    #Progress Note Handoff  Pending (specify):  chf management echo, cardiology, bp  Disposition: home vs snf, PT  Anticipated date: 9/13

## 2022-09-12 NOTE — ED ADULT NURSE REASSESSMENT NOTE - NS ED NURSE REASSESS COMMENT FT1
Pt c/o chest pain, MD Phelps notified, EKG done and put in the chart. As per MD pt is ok to go to the floor. VS stable and documented.

## 2022-09-12 NOTE — PATIENT PROFILE ADULT - FALL HARM RISK - HARM RISK INTERVENTIONS

## 2022-09-13 LAB
ALBUMIN SERPL ELPH-MCNC: 2.9 G/DL — LOW (ref 3.5–5.2)
ALP SERPL-CCNC: 102 U/L — SIGNIFICANT CHANGE UP (ref 30–115)
ALT FLD-CCNC: 9 U/L — SIGNIFICANT CHANGE UP (ref 0–41)
ANION GAP SERPL CALC-SCNC: 10 MMOL/L — SIGNIFICANT CHANGE UP (ref 7–14)
APTT BLD: 34.1 SEC — SIGNIFICANT CHANGE UP (ref 27–39.2)
AST SERPL-CCNC: 19 U/L — SIGNIFICANT CHANGE UP (ref 0–41)
BILIRUB SERPL-MCNC: <0.2 MG/DL — SIGNIFICANT CHANGE UP (ref 0.2–1.2)
BUN SERPL-MCNC: 63 MG/DL — CRITICAL HIGH (ref 10–20)
CALCIUM SERPL-MCNC: 8 MG/DL — LOW (ref 8.4–10.5)
CHLORIDE SERPL-SCNC: 100 MMOL/L — SIGNIFICANT CHANGE UP (ref 98–110)
CO2 SERPL-SCNC: 24 MMOL/L — SIGNIFICANT CHANGE UP (ref 17–32)
CREAT SERPL-MCNC: 2.9 MG/DL — HIGH (ref 0.7–1.5)
EGFR: 15 ML/MIN/1.73M2 — LOW
GLUCOSE SERPL-MCNC: 97 MG/DL — SIGNIFICANT CHANGE UP (ref 70–99)
HCT VFR BLD CALC: 25.4 % — LOW (ref 37–47)
HGB BLD-MCNC: 8.2 G/DL — LOW (ref 12–16)
INR BLD: 1.33 RATIO — HIGH (ref 0.65–1.3)
IRON SATN MFR SERPL: 12 % — LOW (ref 15–50)
IRON SATN MFR SERPL: 25 UG/DL — LOW (ref 35–150)
MCHC RBC-ENTMCNC: 28.9 PG — SIGNIFICANT CHANGE UP (ref 27–31)
MCHC RBC-ENTMCNC: 32.3 G/DL — SIGNIFICANT CHANGE UP (ref 32–37)
MCV RBC AUTO: 89.4 FL — SIGNIFICANT CHANGE UP (ref 81–99)
NRBC # BLD: 0 /100 WBCS — SIGNIFICANT CHANGE UP (ref 0–0)
NT-PROBNP SERPL-SCNC: HIGH PG/ML (ref 0–300)
PLATELET # BLD AUTO: 175 K/UL — SIGNIFICANT CHANGE UP (ref 130–400)
POTASSIUM SERPL-MCNC: 4.8 MMOL/L — SIGNIFICANT CHANGE UP (ref 3.5–5)
POTASSIUM SERPL-SCNC: 4.8 MMOL/L — SIGNIFICANT CHANGE UP (ref 3.5–5)
PROT SERPL-MCNC: 5.6 G/DL — LOW (ref 6–8)
PROTHROM AB SERPL-ACNC: 15.3 SEC — HIGH (ref 9.95–12.87)
RBC # BLD: 2.84 M/UL — LOW (ref 4.2–5.4)
RBC # BLD: 3.01 M/UL — LOW (ref 4.2–5.4)
RBC # FLD: 14 % — SIGNIFICANT CHANGE UP (ref 11.5–14.5)
RETICS #: 43.9 K/UL — SIGNIFICANT CHANGE UP (ref 25–125)
RETICS/RBC NFR: 1.5 % — SIGNIFICANT CHANGE UP (ref 0.5–1.5)
SODIUM SERPL-SCNC: 134 MMOL/L — LOW (ref 135–146)
TIBC SERPL-MCNC: 217 UG/DL — LOW (ref 220–430)
TROPONIN T SERPL-MCNC: 0.03 NG/ML — CRITICAL HIGH
UIBC SERPL-MCNC: 192 UG/DL — SIGNIFICANT CHANGE UP (ref 110–370)
WBC # BLD: 6.84 K/UL — SIGNIFICANT CHANGE UP (ref 4.8–10.8)
WBC # FLD AUTO: 6.84 K/UL — SIGNIFICANT CHANGE UP (ref 4.8–10.8)

## 2022-09-13 PROCEDURE — 99233 SBSQ HOSP IP/OBS HIGH 50: CPT

## 2022-09-13 PROCEDURE — 71045 X-RAY EXAM CHEST 1 VIEW: CPT | Mod: 26

## 2022-09-13 PROCEDURE — 99497 ADVNCD CARE PLAN 30 MIN: CPT

## 2022-09-13 RX ADMIN — LIDOCAINE 1 PATCH: 4 CREAM TOPICAL at 19:30

## 2022-09-13 RX ADMIN — Medication 667 MILLIGRAM(S): at 08:14

## 2022-09-13 RX ADMIN — Medication 20 MILLIGRAM(S): at 06:15

## 2022-09-13 RX ADMIN — Medication 650 MILLIGRAM(S): at 04:48

## 2022-09-13 RX ADMIN — APIXABAN 2.5 MILLIGRAM(S): 2.5 TABLET, FILM COATED ORAL at 06:15

## 2022-09-13 RX ADMIN — Medication 3 MILLILITER(S): at 14:40

## 2022-09-13 RX ADMIN — ATORVASTATIN CALCIUM 20 MILLIGRAM(S): 80 TABLET, FILM COATED ORAL at 21:22

## 2022-09-13 RX ADMIN — ISOSORBIDE MONONITRATE 60 MILLIGRAM(S): 60 TABLET, EXTENDED RELEASE ORAL at 12:14

## 2022-09-13 RX ADMIN — Medication 3 MILLILITER(S): at 08:36

## 2022-09-13 RX ADMIN — LATANOPROST 1 DROP(S): 0.05 SOLUTION/ DROPS OPHTHALMIC; TOPICAL at 17:23

## 2022-09-13 RX ADMIN — LIDOCAINE 1 PATCH: 4 CREAM TOPICAL at 23:20

## 2022-09-13 RX ADMIN — Medication 1 MILLIGRAM(S): at 12:14

## 2022-09-13 RX ADMIN — LIDOCAINE 1 PATCH: 4 CREAM TOPICAL at 12:14

## 2022-09-13 RX ADMIN — LATANOPROST 1 DROP(S): 0.05 SOLUTION/ DROPS OPHTHALMIC; TOPICAL at 06:21

## 2022-09-13 RX ADMIN — Medication 667 MILLIGRAM(S): at 12:13

## 2022-09-13 RX ADMIN — Medication 667 MILLIGRAM(S): at 17:23

## 2022-09-13 NOTE — PROGRESS NOTE ADULT - SUBJECTIVE AND OBJECTIVE BOX
RACHEL SUMMERS 87y Female  MRN#: [id number]   CODE STATUS: FULL    Hospital Day: 3d    Pt is currently admitted with the primary diagnosis of     SUBJECTIVE  Hospital Course    Patient is an 86 year old female patient ([employer]) with pMHX of HFrEF (Echo 3/2022: EF 25-30%) s/p AICD, pulm HTN, provoked PE/DVT on Eliquis, DLD, CKD 4, RA and glaucoma whom presented to ED with SOB and worsening LE edema of 1 day duration. Patient endorses orthopnea. Patient also reports worsening pain in her bilateral shoulders and knees. To note patient was recently admitted on 7/2022 for bilateral shoulder pain. She denies fever, chills, headache, upper respiratory symptoms, abdominal or urinary symptoms. No chest pain, no palpitations. No sick contacts. She is compliant with her medications.     In ED, vitals wnl  Laboratory workup significant for Hb 8.1 (at baseline) K 5.2 Cr 2.5 eGFR 18 (at baseline)   Troponin 0.02 BNP 60192  Chest X-ray: congestion (unofficial read)   She received 1 dose of IV Lasix 40 mg in ED.       Overnight events     No acute overnight events    Subjective complaints     No complaints                                            ----------------------------------------------------------  OBJECTIVE  PAST MEDICAL & SURGICAL HISTORY  Chronic kidney disease    Pacemaker    Hypertension    Gout    Diverticulitis  sigmoid    Diastolic heart failure    Rheumatoid arthritis    DVT, lower extremity  Bilateral    Artificial pacemaker    History of appendectomy    History of tonsillectomy    History of hysterectomy    H/O hernia repair                                              -----------------------------------------------------------  ALLERGIES:  Keflex (Swelling)                                            ------------------------------------------------------------    HOME MEDICATIONS  Home Medications:  amLODIPine 5 mg oral tablet: 1 tab(s) orally once a day (10 Sep 2022 21:37)  apixaban 2.5 mg oral tablet: 1 tab(s) orally 2 times a day (10 Sep 2022 21:37)  calcium (as carbonate) 600 mg oral tablet: 1 tab(s) orally 3 times a day (10 Sep 2022 21:37)  calcium acetate 667 mg oral capsule: 2 cap(s) orally 3 times a day (with meals) (10 Sep 2022 21:37)  folic acid 1 mg oral tablet: 1 tab(s) orally once a day (10 Sep 2022 21:37)  furosemide 40 mg oral tablet: 1 tab(s) orally once a day (10 Sep 2022 21:37)  hydrALAZINE 25 mg oral tablet: 1 tab(s) orally 3 times a day (10 Sep 2022 21:37)  isosorbide mononitrate 60 mg oral tablet, extended release: 1 tab(s) orally once a day (in the morning) (10 Sep 2022 21:37)  latanoprost 0.005% ophthalmic solution: 1 drop(s) to each affected eye 2 times a day (10 Sep 2022 21:37)  Lipitor 20 mg oral tablet: 1 tab(s) orally once a day (at bedtime) (10 Sep 2022 21:37)                           MEDICATIONS:  STANDING MEDICATIONS  ALBUTerol    90 MICROgram(s) HFA Inhaler 1 Puff(s) Inhalation once  albuterol/ipratropium for Nebulization 3 milliLiter(s) Nebulizer every 6 hours  apixaban 2.5 milliGRAM(s) Oral two times a day  atorvastatin 20 milliGRAM(s) Oral at bedtime  calcium acetate 667 milliGRAM(s) Oral three times a day with meals  folic acid 1 milliGRAM(s) Oral daily  hydrALAZINE 10 milliGRAM(s) Oral three times a day  isosorbide   mononitrate ER Tablet (IMDUR) 60 milliGRAM(s) Oral daily  latanoprost 0.005% Ophthalmic Solution 1 Drop(s) Both EYES <User Schedule>  lidocaine   4% Patch 1 Patch Transdermal daily  NIFEdipine XL 60 milliGRAM(s) Oral daily  torsemide 20 milliGRAM(s) Oral daily    PRN MEDICATIONS  acetaminophen     Tablet .. 650 milliGRAM(s) Oral every 6 hours PRN  ALBUTerol    0.083%. 2.5 milliGRAM(s) Nebulizer once PRN  benzocaine 20% Spray 1 Spray(s) Topical three times a day PRN                                            ------------------------------------------------------------  VITAL SIGNS: Last 24 Hours  T(C): 36.5 (13 Sep 2022 05:16), Max: 37.1 (12 Sep 2022 12:04)  T(F): 97.7 (13 Sep 2022 05:16), Max: 98.8 (12 Sep 2022 12:04)  HR: 76 (13 Sep 2022 07:46) (76 - 89)  BP: 129/65 (13 Sep 2022 05:16) (102/53 - 129/65)  BP(mean): --  RR: 18 (13 Sep 2022 07:46) (18 - 20)  SpO2: 98% (13 Sep 2022 07:46) (95% - 100%)                                             --------------------------------------------------------------  LABS:                        8.2    6.84  )-----------( 175      ( 13 Sep 2022 04:30 )             25.4     09-13    134<L>  |  100  |  63<HH>  ----------------------------<  97  4.8   |  24  |  2.9<H>    Ca    8.0<L>      13 Sep 2022 04:30  Mg     1.8     09-12    TPro  5.6<L>  /  Alb  2.9<L>  /  TBili  <0.2  /  DBili  x   /  AST  19  /  ALT  9   /  AlkPhos  102  09-13          Troponin T, Serum: 0.03 ng/mL *HH* (09-13-22 @ 01:40)  Troponin T, Serum: 0.03 ng/mL ** (09-12-22 @ 17:47)          CARDIAC MARKERS ( 13 Sep 2022 01:40 )  x     / 0.03 ng/mL / x     / x     / x      CARDIAC MARKERS ( 12 Sep 2022 17:47 )  x     / 0.03 ng/mL / x     / x     / x                                                --------------------------------------------------------------    PHYSICAL EXAM:  GENERAL: NAD, well-developed  HEAD:  Atraumatic, Normocephalic  EYES: conjunctiva and sclera clear  NECK: Supple, No JVD  CHEST/LUNG: Clear to auscultation bilaterally; No wheeze  HEART: Regular rate and rhythm; No murmurs, rubs, or gallops  ABDOMEN: Soft, Nontender, Nondistended  EXTREMITIES:  No clubbing, cyanosis, or edema  PSYCH: AAOx3                                             --------------------------------------------------------------         RACHEL SUMMERS 87y Female  MRN#: [id number]   CODE STATUS: FULL    Hospital Day: 3d    Pt is currently admitted with the primary diagnosis of acute CHF exacerbation    SUBJECTIVE  Hospital Course    Patient is an 86 year old female patient ([employer]) with pMHX of HFrEF (Echo 3/2022: EF 25-30%) s/p AICD, pulm HTN, provoked PE/DVT on Eliquis, DLD, CKD 4, RA and glaucoma whom presented to ED with SOB and worsening LE edema of 1 day duration. Patient endorses orthopnea. Patient also reports worsening pain in her bilateral shoulders and knees. To note patient was recently admitted on 7/2022 for bilateral shoulder pain. She denies fever, chills, headache, upper respiratory symptoms, abdominal or urinary symptoms. No chest pain, no palpitations. No sick contacts. She is compliant with her medications.     In ED, vitals wnl  Laboratory workup significant for Hb 8.1 (at baseline) K 5.2 Cr 2.5 eGFR 18 (at baseline)   Troponin 0.02 BNP 18216  Chest X-ray: congestion (unofficial read)   She received 1 dose of IV Lasix 40 mg in ED.       Overnight events     No acute overnight events    Subjective complaints     No complaints                                            ----------------------------------------------------------  OBJECTIVE  PAST MEDICAL & SURGICAL HISTORY  Chronic kidney disease    Pacemaker    Hypertension    Gout    Diverticulitis  sigmoid    Diastolic heart failure    Rheumatoid arthritis    DVT, lower extremity  Bilateral    Artificial pacemaker    History of appendectomy    History of tonsillectomy    History of hysterectomy    H/O hernia repair                                              -----------------------------------------------------------  ALLERGIES:  Keflex (Swelling)                                            ------------------------------------------------------------    HOME MEDICATIONS  Home Medications:  amLODIPine 5 mg oral tablet: 1 tab(s) orally once a day (10 Sep 2022 21:37)  apixaban 2.5 mg oral tablet: 1 tab(s) orally 2 times a day (10 Sep 2022 21:37)  calcium (as carbonate) 600 mg oral tablet: 1 tab(s) orally 3 times a day (10 Sep 2022 21:37)  calcium acetate 667 mg oral capsule: 2 cap(s) orally 3 times a day (with meals) (10 Sep 2022 21:37)  folic acid 1 mg oral tablet: 1 tab(s) orally once a day (10 Sep 2022 21:37)  furosemide 40 mg oral tablet: 1 tab(s) orally once a day (10 Sep 2022 21:37)  hydrALAZINE 25 mg oral tablet: 1 tab(s) orally 3 times a day (10 Sep 2022 21:37)  isosorbide mononitrate 60 mg oral tablet, extended release: 1 tab(s) orally once a day (in the morning) (10 Sep 2022 21:37)  latanoprost 0.005% ophthalmic solution: 1 drop(s) to each affected eye 2 times a day (10 Sep 2022 21:37)  Lipitor 20 mg oral tablet: 1 tab(s) orally once a day (at bedtime) (10 Sep 2022 21:37)                           MEDICATIONS:  STANDING MEDICATIONS  ALBUTerol    90 MICROgram(s) HFA Inhaler 1 Puff(s) Inhalation once  albuterol/ipratropium for Nebulization 3 milliLiter(s) Nebulizer every 6 hours  apixaban 2.5 milliGRAM(s) Oral two times a day  atorvastatin 20 milliGRAM(s) Oral at bedtime  calcium acetate 667 milliGRAM(s) Oral three times a day with meals  folic acid 1 milliGRAM(s) Oral daily  hydrALAZINE 10 milliGRAM(s) Oral three times a day  isosorbide   mononitrate ER Tablet (IMDUR) 60 milliGRAM(s) Oral daily  latanoprost 0.005% Ophthalmic Solution 1 Drop(s) Both EYES <User Schedule>  lidocaine   4% Patch 1 Patch Transdermal daily  NIFEdipine XL 60 milliGRAM(s) Oral daily  torsemide 20 milliGRAM(s) Oral daily    PRN MEDICATIONS  acetaminophen     Tablet .. 650 milliGRAM(s) Oral every 6 hours PRN  ALBUTerol    0.083%. 2.5 milliGRAM(s) Nebulizer once PRN  benzocaine 20% Spray 1 Spray(s) Topical three times a day PRN                                            ------------------------------------------------------------  VITAL SIGNS: Last 24 Hours  T(C): 36.5 (13 Sep 2022 05:16), Max: 37.1 (12 Sep 2022 12:04)  T(F): 97.7 (13 Sep 2022 05:16), Max: 98.8 (12 Sep 2022 12:04)  HR: 76 (13 Sep 2022 07:46) (76 - 89)  BP: 129/65 (13 Sep 2022 05:16) (102/53 - 129/65)  BP(mean): --  RR: 18 (13 Sep 2022 07:46) (18 - 20)  SpO2: 98% (13 Sep 2022 07:46) (95% - 100%)                                             --------------------------------------------------------------  LABS:                        8.2    6.84  )-----------( 175      ( 13 Sep 2022 04:30 )             25.4     09-13    134<L>  |  100  |  63<HH>  ----------------------------<  97  4.8   |  24  |  2.9<H>    Ca    8.0<L>      13 Sep 2022 04:30  Mg     1.8     09-12    TPro  5.6<L>  /  Alb  2.9<L>  /  TBili  <0.2  /  DBili  x   /  AST  19  /  ALT  9   /  AlkPhos  102  09-13          Troponin T, Serum: 0.03 ng/mL *HH* (09-13-22 @ 01:40)  Troponin T, Serum: 0.03 ng/mL *HH* (09-12-22 @ 17:47)          CARDIAC MARKERS ( 13 Sep 2022 01:40 )  x     / 0.03 ng/mL / x     / x     / x      CARDIAC MARKERS ( 12 Sep 2022 17:47 )  x     / 0.03 ng/mL / x     / x     / x                                                --------------------------------------------------------------    PHYSICAL EXAM:  GENERAL: NAD, well-developed  HEAD:  Atraumatic, Normocephalic  EYES: conjunctiva and sclera clear  NECK: Supple, No JVD  CHEST/LUNG: Clear to auscultation bilaterally; No wheeze  HEART: Regular rate and rhythm; No murmurs, rubs, or gallops  ABDOMEN: Soft, Nontender, Nondistended  EXTREMITIES:  No clubbing, cyanosis, or edema  PSYCH: AAOx3                                             --------------------------------------------------------------

## 2022-09-13 NOTE — CONSULT NOTE ADULT - TIME BILLING
CTS Attending  pt interviewed and examined  pt and daughter interviewed simultaneously (dtr by phone)  case discussed with Dr. Mckinney, and previously, Dr. Baez (EP)  pt admitted with signs and symptoms of CHF  her CDK is worsening  She is anemic and a [employer]  The patient has a long-standing BiV-ICD with an epicardial lead placed by thoracotomy,   predating 2008 (first CxR in patient's PACS folder shows device already in place)  pt is debilitated and frail  The magnitude of the surgery is excessive for the patient.   To replace the existing LV lead, it would take a re-op left thoracotomy (bloody procedure), and she would not accept a blood transfusion  General anesthesia would be required, and pt could fail extubation  I my assessment, the benefit of the surgery is overwhelmed by the risk of morbidity, complications and loss of independence  especially with the limitations imposed by the restriction on blood transfusion  I advise against surgical intervention  Please have a meaningful discussion about goals of care   60-min consult/advise/PMD-Hank/Nestor/EP/imaging/chart/labs review  -FMR

## 2022-09-13 NOTE — PROGRESS NOTE ADULT - CONVERSATION DETAILS
Discussed in detail regarding code status and resuscitation measure. initially patient states she do not want to be intubated; but wished for cardiac compression measures if her heart stops. Educated patient regarding need for respiratory support if cardiac arrest happens which she verbalized understanding.   Patient wanted to defer the decision to daughter Leona Marrufo. I discussed code status. daughter had further discussed with patient.     Family and patient wishes to be full code at this time

## 2022-09-13 NOTE — PROGRESS NOTE ADULT - SUBJECTIVE AND OBJECTIVE BOX
HPI  Patient is a 87y old Female who presents with a chief complaint of CHF exacerbation (13 Sep 2022 13:19)    Currently admitted to medicine with the primary diagnosis of CHF exacerbation       Today is hospital day 3d.     INTERVAL HPI / OVERNIGHT EVENTS:  Patient was seen and examined at bedside  Patient Feels better; states she had SOB last night and was placed on BIPAP  Denies any complains of chest pain or shortness of breath now  Denies any abdominal pain/nausea/vomiting  has b/l shoulder pain        PAST MEDICAL & SURGICAL HISTORY  Chronic kidney disease    Pacemaker    Hypertension    Gout    Diverticulitis  sigmoid    Diastolic heart failure    Rheumatoid arthritis    DVT, lower extremity  Bilateral    Artificial pacemaker    History of appendectomy    History of tonsillectomy    History of hysterectomy    H/O hernia repair      ALLERGIES  Keflex (Swelling)    MEDICATIONS  STANDING MEDICATIONS  ALBUTerol    90 MICROgram(s) HFA Inhaler 1 Puff(s) Inhalation once  albuterol/ipratropium for Nebulization 3 milliLiter(s) Nebulizer every 6 hours  atorvastatin 20 milliGRAM(s) Oral at bedtime  calcium acetate 667 milliGRAM(s) Oral three times a day with meals  folic acid 1 milliGRAM(s) Oral daily  hydrALAZINE 10 milliGRAM(s) Oral three times a day  isosorbide   mononitrate ER Tablet (IMDUR) 60 milliGRAM(s) Oral daily  latanoprost 0.005% Ophthalmic Solution 1 Drop(s) Both EYES <User Schedule>  lidocaine   4% Patch 1 Patch Transdermal daily  NIFEdipine XL 60 milliGRAM(s) Oral daily  torsemide 20 milliGRAM(s) Oral daily    PRN MEDICATIONS  acetaminophen     Tablet .. 650 milliGRAM(s) Oral every 6 hours PRN  ALBUTerol    0.083%. 2.5 milliGRAM(s) Nebulizer once PRN  benzocaine 20% Spray 1 Spray(s) Topical three times a day PRN    VITALS:  T(F): 97  HR: 96  BP: 120/58  RR: 18  SpO2: 98%    PHYSICAL EXAM  GEN: no distress, comfortable  PULM: BS heard b/l equal, No wheezing  CVS: S1S2 present, no rubs or gallops  ABD: Soft, non-distended, no guarding; non-tender  EXT: No lower extremity edema  NEURO: A&Ox3, moving all extremities    LABS                        8.2    6.84  )-----------( 175      ( 13 Sep 2022 04:30 )             25.4     09-13    134<L>  |  100  |  63<HH>  ----------------------------<  97  4.8   |  24  |  2.9<H>    Ca    8.0<L>      13 Sep 2022 04:30  Mg     1.8     09-12    TPro  5.6<L>  /  Alb  2.9<L>  /  TBili  <0.2  /  DBili  x   /  AST  19  /  ALT  9   /  AlkPhos  102  09-13          Troponin T, Serum: 0.03 ng/mL *HH* (09-13-22 @ 01:40)  Troponin T, Serum: 0.03 ng/mL ** (09-12-22 @ 17:47)      CARDIAC MARKERS ( 13 Sep 2022 01:40 )  x     / 0.03 ng/mL / x     / x     / x      CARDIAC MARKERS ( 12 Sep 2022 17:47 )  x     / 0.03 ng/mL / x     / x     / x          RADIOLOGY

## 2022-09-13 NOTE — CONSULT NOTE ADULT - SUBJECTIVE AND OBJECTIVE BOX
Surgeon: Dr. Wood/ Reddy    Consult requesting by: Nestor    HISTORY OF PRESENT ILLNESS:  87y Female with PMH of CKD IV, NICM s/p ICD, HTN, Gout, DVT, HFmrEF admitted with exacerbation of HF. CTS contacted by primary electrophysiologist, Dr. Baez, requested replacement of LV lead. Patient was scheduled for office visit 9/24/22 but while as an inpatient is now asked to see for possible surgery this admission.    Home Medications:  amLODIPine 5 mg oral tablet: 1 tab(s) orally once a day (10 Sep 2022 21:37)  apixaban 2.5 mg oral tablet: 1 tab(s) orally 2 times a day (10 Sep 2022 21:37)  calcium (as carbonate) 600 mg oral tablet: 1 tab(s) orally 3 times a day (10 Sep 2022 21:37)  calcium acetate 667 mg oral capsule: 2 cap(s) orally 3 times a day (with meals) (10 Sep 2022 21:37)  folic acid 1 mg oral tablet: 1 tab(s) orally once a day (10 Sep 2022 21:37)  furosemide 40 mg oral tablet: 1 tab(s) orally once a day (10 Sep 2022 21:37)  hydrALAZINE 25 mg oral tablet: 1 tab(s) orally 3 times a day (10 Sep 2022 21:37)  isosorbide mononitrate 60 mg oral tablet, extended release: 1 tab(s) orally once a day (in the morning) (10 Sep 2022 21:37)  latanoprost 0.005% ophthalmic solution: 1 drop(s) to each affected eye 2 times a day (10 Sep 2022 21:37)  Lipitor 20 mg oral tablet: 1 tab(s) orally once a day (at bedtime) (10 Sep 2022 21:37)    NYHA functional class    [ ] Class I (no limitation) [ ] Class II (slight limitation) [ ] Class III (marked limitation) [ ] Class IV (symptoms at rest)    PAST MEDICAL & SURGICAL HISTORY:  Chronic kidney disease  Pacemaker  Hypertension  Gout  Diverticulitis  sigmoid  Diastolic heart failure  Rheumatoid arthritis  DVT, lower extremity  Bilateral  Artificial pacemaker  History of appendectomy  History of tonsillectomy  History of hysterectomy  H/O hernia repair    MEDICATIONS  (STANDING):  ALBUTerol    90 MICROgram(s) HFA Inhaler 1 Puff(s) Inhalation once  albuterol/ipratropium for Nebulization 3 milliLiter(s) Nebulizer every 6 hours  apixaban 2.5 milliGRAM(s) Oral two times a day  atorvastatin 20 milliGRAM(s) Oral at bedtime  calcium acetate 667 milliGRAM(s) Oral three times a day with meals  folic acid 1 milliGRAM(s) Oral daily  hydrALAZINE 10 milliGRAM(s) Oral three times a day  isosorbide   mononitrate ER Tablet (IMDUR) 60 milliGRAM(s) Oral daily  latanoprost 0.005% Ophthalmic Solution 1 Drop(s) Both EYES <User Schedule>  lidocaine   4% Patch 1 Patch Transdermal daily  NIFEdipine XL 60 milliGRAM(s) Oral daily  torsemide 20 milliGRAM(s) Oral daily    MEDICATIONS  (PRN):  acetaminophen     Tablet .. 650 milliGRAM(s) Oral every 6 hours PRN Mild Pain (1 - 3), Moderate Pain (4 - 6)  ALBUTerol    0.083%. 2.5 milliGRAM(s) Nebulizer once PRN Shortness of Breath and/or Wheezing  benzocaine 20% Spray 1 Spray(s) Topical three times a day PRN sore throat    Antiplatelet therapy:   Eliquis                   Last dose/amt:    Allergies    Keflex (Swelling)    Intolerances    SOCIAL HISTORY:  no recent alcohol, drug or tobacco abuse  Occupation:  Lives with:  Assisted device use:    FAMILY HISTORY:  FH: arthritis  sister    Review of Systems  CONSTITUTIONAL: no fever, chills or night sweats]   NEURO:  denies seizures, paralysis or paresthesias                                                                                EYES: wears glasses, no double vision, no blurry vision                                                                          ENMT: no difficulty hearing, vertigo, dysphagia, epistaxis recent dental work [ ]                                      CV:  denies chest pain, GARCIA or palpatations at current time                                                                                            RESPIRATORY:  no cough or hemoptysis                                                                 GI:  no nausea, vomiting, constipation or diarrhea   : denies dysuria, hematuria, incontinence or retention                                                                                           MUSKULOSKELETAL:  denies joint swelling or muscle weakness  PSYCH:  denies dementia, depression, anxiety   ENDOCRINE:  cold intolerance[ ] heat intolerance[ ] polydipsia[ ]                                                                                                                                                                                             PHYSICAL EXAM  Vital Signs Last 24 Hrs  T(C): 36.5 (13 Sep 2022 05:16), Max: 36.5 (13 Sep 2022 05:16)  T(F): 97.7 (13 Sep 2022 05:16), Max: 97.7 (13 Sep 2022 05:16)  HR: 76 (13 Sep 2022 07:46) (76 - 89)  BP: 129/65 (13 Sep 2022 05:16) (106/56 - 129/65)  BP(mean): --  RR: 18 (13 Sep 2022 07:46) (18 - 20)  SpO2: 98% (13 Sep 2022 07:46) (95% - 100%)    Parameters below as of 13 Sep 2022 07:46  Patient On (Oxygen Delivery Method): room air      Right arm bp: Left arm bp;    CONSTITUTIONAL:    well nourished, well developed, overweight in NAD                                                                       Neuro: oriented to person/place & time with no focal motor or sensory  deficits     Eyes: PERRLA, EOMI, no nystagmus, sclera anicteric  ENT:  nasal/oral mucosa with absence of cyanosis, fair dentition  Neck: no jugular vein distention, trachea midline, no goiter   Chest: bilateral breath sounds with good air movement absence of wheezes, rales, or rhonchi                                                                           CV:  RSR, S1S2, no significant murmur appreciated  Carotids: No Bruits  GI:  soft, non-tender non-distended, + bowel sounds                                                                                                          Extremities:  no evidence of cyanosis or deformity, no pedal edema   Extremity Pulses: right / left:  femorals 2+/ 2+; DP's 2+/2+; radials 2+/2+    negative Allens  SKIN : no rashes                                                          LABS:                        8.2    6.84  )-----------( 175      ( 13 Sep 2022 04:30 )             25.4     09-13    134<L>  |  100  |  63<HH>  ----------------------------<  97  4.8   |  24  |  2.9<H>    Ca    8.0<L>      13 Sep 2022 04:30  Mg     1.8     09-12    TPro  5.6<L>  /  Alb  2.9<L>  /  TBili  <0.2  /  DBili  x   /  AST  19  /  ALT  9   /  AlkPhos  102  09-13    CARDIAC MARKERS ( 13 Sep 2022 01:40 )  x     / 0.03 ng/mL / x     / x     / x      CARDIAC MARKERS ( 12 Sep 2022 17:47 )  x     / 0.03 ng/mL / x     / x     / x        COVID-19: NotDetec (09-10-22 @ 20:09)  NotDetec (07-18-22 @ 14:17)  NotDetec (07-17-22 @ 13:10)    < from: [facility] Duplex Lower Ext Vein Scan, Bilat (09.11.22 @ 09:17) >  No evidence of deep venous thrombosis or superficial thrombophlebitis in   the bilateral lower extremities.    < end of copied text >  < from: TTE Echo Complete w/o Contrast w/ Doppler (09.12.22 @ 12:50) >  Summary:   1. Mildly decreasedglobal left ventricular systolic function with   global hypokinesis.   2. Left ventricular ejection fraction, by visual estimation, is 40 to   45%.   3. Severe concentric left ventricular hypertrophy.   4. Spectral Doppler shows pseudonormal pattern of left ventricular   myocardial filling (Grade II diastolic dysfunction).   5. Normal right ventricular size and function.   6. Mild biatrial enlargement   7. No hemodynamically significant valvular abnormality.   8. At least mild pulmonary hypertension based on TR velocity. IVC was   not visualized to estimate RAP.   9. Compared to the previous study 3/7/22, the LV systolic function   appears improved.    < end of copied text >  < from: 12 Lead ECG (09.12.22 @ 15:56) >  Ventricular Rate 79 BPM    Atrial Rate 79 BPM    P-R Interval 136 ms    QRS Duration 128 ms    Q-T Interval 446 ms    QTC Calculation(Bazett) 511 ms    P Axis 34 degrees    R Axis -13 degrees    T Axis 159 degrees    Diagnosis Line Normal sinus rhythm  Left ventricular hypertrophy with QRS widening  T wave abnormality, consider lateral ischemia  Abnormal ECG          Assessment/ Plan:                 Surgeon: Dr. Wood/ Reddy    Consult requesting by: Nestor    HISTORY OF PRESENT ILLNESS:  87y Female with PMH of CKD IV, NICM s/p ICD, HTN, Gout, DVT, HFmrEF admitted with exacerbation of HF. CTS contacted by primary electrophysiologist, Dr. Baez, requested replacement of LV lead. Patient was scheduled for office visit 9/24/22 but while as an inpatient is now asked to see for possible surgery this admission.    Home Medications:  amLODIPine 5 mg oral tablet: 1 tab(s) orally once a day (10 Sep 2022 21:37)  apixaban 2.5 mg oral tablet: 1 tab(s) orally 2 times a day (10 Sep 2022 21:37)  calcium (as carbonate) 600 mg oral tablet: 1 tab(s) orally 3 times a day (10 Sep 2022 21:37)  calcium acetate 667 mg oral capsule: 2 cap(s) orally 3 times a day (with meals) (10 Sep 2022 21:37)  folic acid 1 mg oral tablet: 1 tab(s) orally once a day (10 Sep 2022 21:37)  furosemide 40 mg oral tablet: 1 tab(s) orally once a day (10 Sep 2022 21:37)  hydrALAZINE 25 mg oral tablet: 1 tab(s) orally 3 times a day (10 Sep 2022 21:37)  isosorbide mononitrate 60 mg oral tablet, extended release: 1 tab(s) orally once a day (in the morning) (10 Sep 2022 21:37)  latanoprost 0.005% ophthalmic solution: 1 drop(s) to each affected eye 2 times a day (10 Sep 2022 21:37)  Lipitor 20 mg oral tablet: 1 tab(s) orally once a day (at bedtime) (10 Sep 2022 21:37)    NYHA functional class    [ ] Class I (no limitation) [ ] Class II (slight limitation) [ ] Class III (marked limitation) [ ] Class IV (symptoms at rest)    PAST MEDICAL & SURGICAL HISTORY:  Chronic kidney disease  Pacemaker  Hypertension  Gout  Diverticulitis  sigmoid  Diastolic heart failure  Rheumatoid arthritis  DVT, lower extremity  Bilateral  Artificial pacemaker  History of appendectomy  History of tonsillectomy  History of hysterectomy  H/O hernia repair    MEDICATIONS  (STANDING):  ALBUTerol    90 MICROgram(s) HFA Inhaler 1 Puff(s) Inhalation once  albuterol/ipratropium for Nebulization 3 milliLiter(s) Nebulizer every 6 hours  apixaban 2.5 milliGRAM(s) Oral two times a day  atorvastatin 20 milliGRAM(s) Oral at bedtime  calcium acetate 667 milliGRAM(s) Oral three times a day with meals  folic acid 1 milliGRAM(s) Oral daily  hydrALAZINE 10 milliGRAM(s) Oral three times a day  isosorbide   mononitrate ER Tablet (IMDUR) 60 milliGRAM(s) Oral daily  latanoprost 0.005% Ophthalmic Solution 1 Drop(s) Both EYES <User Schedule>  lidocaine   4% Patch 1 Patch Transdermal daily  NIFEdipine XL 60 milliGRAM(s) Oral daily  torsemide 20 milliGRAM(s) Oral daily    MEDICATIONS  (PRN):  acetaminophen     Tablet .. 650 milliGRAM(s) Oral every 6 hours PRN Mild Pain (1 - 3), Moderate Pain (4 - 6)  ALBUTerol    0.083%. 2.5 milliGRAM(s) Nebulizer once PRN Shortness of Breath and/or Wheezing  benzocaine 20% Spray 1 Spray(s) Topical three times a day PRN sore throat    Antiplatelet therapy:   Eliquis                   Last dose/amt:    Allergies    Keflex (Swelling)    Intolerances    SOCIAL HISTORY:  no recent alcohol, drug or tobacco abuse  Occupation:  Lives with:  Assisted device use:    FAMILY HISTORY:  FH: arthritis  sister    Review of Systems  CONSTITUTIONAL: no fever, chills or night sweats]   NEURO:  denies seizures, paralysis or paresthesias                                                                                EYES: wears glasses, no double vision, no blurry vision                                                                          ENMT: no difficulty hearing, vertigo, dysphagia, epistaxis recent dental work [ ]                                      CV:  denies chest pain, GARCIA or palpatations at current time                                                                                            RESPIRATORY:  no cough or hemoptysis                                                                 GI:  no nausea, vomiting, constipation or diarrhea   : denies dysuria, hematuria, incontinence or retention                                                                                           MUSKULOSKELETAL:  denies joint swelling or muscle weakness  PSYCH:  denies dementia, depression, anxiety   ENDOCRINE:  cold intolerance[ ] heat intolerance[ ] polydipsia[ ]                                                                                                                                                                                             PHYSICAL EXAM  Vital Signs Last 24 Hrs  T(C): 36.5 (13 Sep 2022 05:16), Max: 36.5 (13 Sep 2022 05:16)  T(F): 97.7 (13 Sep 2022 05:16), Max: 97.7 (13 Sep 2022 05:16)  HR: 76 (13 Sep 2022 07:46) (76 - 89)  BP: 129/65 (13 Sep 2022 05:16) (106/56 - 129/65)  BP(mean): --  RR: 18 (13 Sep 2022 07:46) (18 - 20)  SpO2: 98% (13 Sep 2022 07:46) (95% - 100%)    Parameters below as of 13 Sep 2022 07:46  Patient On (Oxygen Delivery Method): room air      Right arm bp: Left arm bp;    CONSTITUTIONAL:    frail 83 yo F in NAD                                                                       Neuro: oriented to person/place & time with no focal motor or sensory  deficits     Eyes: PERRLA, EOMI, no nystagmus, sclera anicteric  ENT:  nasal/oral mucosa with absence of cyanosis, dentures  Chest: bilateral breath sounds with good air movement bs decreased at bases  CV:  RSR, S1S2, ? murmur appreciated  GI:  soft, non-tender non-distended, + bowel sounds                                                                                                          Extremities:  no evidence of cyanosis or deformity, no pedal edema   Extremity Pulses: right / left:  femorals 2+/ 2+; DP's +/+; radials +/+      SKIN : no rashes                                                          LABS:                        8.2    6.84  )-----------( 175      ( 13 Sep 2022 04:30 )             25.4     09-13    134<L>  |  100  |  63<HH>  ----------------------------<  97  4.8   |  24  |  2.9<H>    Ca    8.0<L>      13 Sep 2022 04:30  Mg     1.8     09-12    TPro  5.6<L>  /  Alb  2.9<L>  /  TBili  <0.2  /  DBili  x   /  AST  19  /  ALT  9   /  AlkPhos  102  09-13    CARDIAC MARKERS ( 13 Sep 2022 01:40 )  x     / 0.03 ng/mL / x     / x     / x      CARDIAC MARKERS ( 12 Sep 2022 17:47 )  x     / 0.03 ng/mL / x     / x     / x        COVID-19: NotDetec (09-10-22 @ 20:09)  NotDetec (07-18-22 @ 14:17)  NotDete (07-17-22 @ 13:10)    < from: [facility] Duplex Lower Ext Vein Scan, Bilat (09.11.22 @ 09:17) >  No evidence of deep venous thrombosis or superficial thrombophlebitis in   the bilateral lower extremities.    < end of copied text >  < from: TTE Echo Complete w/o Contrast w/ Doppler (09.12.22 @ 12:50) >  Summary:   1. Mildly decreasedglobal left ventricular systolic function with   global hypokinesis.   2. Left ventricular ejection fraction, by visual estimation, is 40 to   45%.   3. Severe concentric left ventricular hypertrophy.   4. Spectral Doppler shows pseudonormal pattern of left ventricular   myocardial filling (Grade II diastolic dysfunction).   5. Normal right ventricular size and function.   6. Mild biatrial enlargement   7. No hemodynamically significant valvular abnormality.   8. At least mild pulmonary hypertension based on TR velocity. IVC was   not visualized to estimate RAP.   9. Compared to the previous study 3/7/22, the LV systolic function   appears improved.    < end of copied text >  < from: 12 Lead ECG (09.12.22 @ 15:56) >  Ventricular Rate 79 BPM    Atrial Rate 79 BPM    P-R Interval 136 ms    QRS Duration 128 ms    Q-T Interval 446 ms    QTC Calculation(Bazett) 511 ms    P Axis 34 degrees    R Axis -13 degrees    T Axis 159 degrees    Diagnosis Line Normal sinus rhythm  Left ventricular hypertrophy with QRS widening  T wave abnormality, consider lateral ischemia  Abnormal ECG    < from: Xray Chest 1 View-PORTABLE IMMEDIATE (Xray Chest 1 View-PORTABLE IMMEDIATE .) (09.13.22 @ 10:19) >  Decreased to resolved bilateral opacities. No pneumothorax. No   significant pleural effusion on frontal view.    Stable cardiomediastinal silhouette.    Unchanged osseous structures.    < end of copied text >        Assessment/ Plan:    87y Female with PMH of CKD IV, NICM s/p ICD, HTN, Gout, DVT, HFmrEF admitted with exacerbation of HF. CTS contacted by primary electrophysiologist, Dr. Baez, requested insertion of LV lead.   Will add to schedule for tomorrow  Device is [employer] - rep will be notified  Keep NPO after midnight  Hold eliquis  No type and screen, patient is Jehov[employer] Witnesss  Surgeon to obtain consent  Plan discussed with medical team, patient and daughter                 Surgeon: Dr. Wood/ Reddy    Consult requesting by: Nestor    HISTORY OF PRESENT ILLNESS:  87y Female with PMH of CKD IV, NICM s/p ICD, HTN, Gout, DVT, HFmrEF admitted with exacerbation of HF. CTS contacted by primary electrophysiologist, Dr. Baez, requested replacement of LV lead. Patient was scheduled for office visit 9/24/22 but while as an inpatient is now asked to see for possible surgery this admission.    Home Medications:  amLODIPine 5 mg oral tablet: 1 tab(s) orally once a day (10 Sep 2022 21:37)  apixaban 2.5 mg oral tablet: 1 tab(s) orally 2 times a day (10 Sep 2022 21:37)  calcium (as carbonate) 600 mg oral tablet: 1 tab(s) orally 3 times a day (10 Sep 2022 21:37)  calcium acetate 667 mg oral capsule: 2 cap(s) orally 3 times a day (with meals) (10 Sep 2022 21:37)  folic acid 1 mg oral tablet: 1 tab(s) orally once a day (10 Sep 2022 21:37)  furosemide 40 mg oral tablet: 1 tab(s) orally once a day (10 Sep 2022 21:37)  hydrALAZINE 25 mg oral tablet: 1 tab(s) orally 3 times a day (10 Sep 2022 21:37)  isosorbide mononitrate 60 mg oral tablet, extended release: 1 tab(s) orally once a day (in the morning) (10 Sep 2022 21:37)  latanoprost 0.005% ophthalmic solution: 1 drop(s) to each affected eye 2 times a day (10 Sep 2022 21:37)  Lipitor 20 mg oral tablet: 1 tab(s) orally once a day (at bedtime) (10 Sep 2022 21:37)    NYHA functional class    [ ] Class I (no limitation) [ ] Class II (slight limitation) [ ] Class III (marked limitation) [ ] Class IV (symptoms at rest)    PAST MEDICAL & SURGICAL HISTORY:  Chronic kidney disease  Pacemaker  Hypertension  Gout  Diverticulitis  sigmoid  Diastolic heart failure  Rheumatoid arthritis  DVT, lower extremity  Bilateral  Artificial pacemaker  History of appendectomy  History of tonsillectomy  History of hysterectomy  H/O hernia repair    MEDICATIONS  (STANDING):  ALBUTerol    90 MICROgram(s) HFA Inhaler 1 Puff(s) Inhalation once  albuterol/ipratropium for Nebulization 3 milliLiter(s) Nebulizer every 6 hours  apixaban 2.5 milliGRAM(s) Oral two times a day  atorvastatin 20 milliGRAM(s) Oral at bedtime  calcium acetate 667 milliGRAM(s) Oral three times a day with meals  folic acid 1 milliGRAM(s) Oral daily  hydrALAZINE 10 milliGRAM(s) Oral three times a day  isosorbide   mononitrate ER Tablet (IMDUR) 60 milliGRAM(s) Oral daily  latanoprost 0.005% Ophthalmic Solution 1 Drop(s) Both EYES <User Schedule>  lidocaine   4% Patch 1 Patch Transdermal daily  NIFEdipine XL 60 milliGRAM(s) Oral daily  torsemide 20 milliGRAM(s) Oral daily    MEDICATIONS  (PRN):  acetaminophen     Tablet .. 650 milliGRAM(s) Oral every 6 hours PRN Mild Pain (1 - 3), Moderate Pain (4 - 6)  ALBUTerol    0.083%. 2.5 milliGRAM(s) Nebulizer once PRN Shortness of Breath and/or Wheezing  benzocaine 20% Spray 1 Spray(s) Topical three times a day PRN sore throat    Antiplatelet therapy:   Eliquis                   Last dose/amt:    Allergies    Keflex (Swelling)    Intolerances    SOCIAL HISTORY:  no recent alcohol, drug or tobacco abuse  Occupation:  Lives with:  Assisted device use:    FAMILY HISTORY:  FH: arthritis  sister    Review of Systems  CONSTITUTIONAL: no fever, chills or night sweats]   NEURO:  denies seizures, paralysis or paresthesias                                                                                EYES: wears glasses, no double vision, no blurry vision                                                                          ENMT: no difficulty hearing, vertigo, dysphagia, epistaxis recent dental work [ ]                                      CV:  denies chest pain, GARCIA or palpatations at current time                                                                                            RESPIRATORY:  no cough or hemoptysis                                                                 GI:  no nausea, vomiting, constipation or diarrhea   : denies dysuria, hematuria, incontinence or retention                                                                                           MUSKULOSKELETAL:  denies joint swelling or muscle weakness  PSYCH:  denies dementia, depression, anxiety   ENDOCRINE:  cold intolerance[ ] heat intolerance[ ] polydipsia[ ]                                                                                                                                                                                             PHYSICAL EXAM  Vital Signs Last 24 Hrs  T(C): 36.5 (13 Sep 2022 05:16), Max: 36.5 (13 Sep 2022 05:16)  T(F): 97.7 (13 Sep 2022 05:16), Max: 97.7 (13 Sep 2022 05:16)  HR: 76 (13 Sep 2022 07:46) (76 - 89)  BP: 129/65 (13 Sep 2022 05:16) (106/56 - 129/65)  BP(mean): --  RR: 18 (13 Sep 2022 07:46) (18 - 20)  SpO2: 98% (13 Sep 2022 07:46) (95% - 100%)    Parameters below as of 13 Sep 2022 07:46  Patient On (Oxygen Delivery Method): room air      Right arm bp: Left arm bp;    CONSTITUTIONAL:    frail 83 yo F in NAD                                                                       Neuro: oriented to person/place & time with no focal motor or sensory  deficits     Eyes: PERRLA, EOMI, no nystagmus, sclera anicteric  ENT:  nasal/oral mucosa with absence of cyanosis, dentures  Chest: bilateral breath sounds with good air movement bs decreased at bases  CV:  RSR, S1S2, ? murmur appreciated  GI:  soft, non-tender non-distended, + bowel sounds                                                                                                          Extremities:  no evidence of cyanosis or deformity, no pedal edema   Extremity Pulses: right / left:  femorals 2+/ 2+; DP's +/+; radials +/+      SKIN : no rashes                                                          LABS:                        8.2    6.84  )-----------( 175      ( 13 Sep 2022 04:30 )             25.4     09-13    134<L>  |  100  |  63<HH>  ----------------------------<  97  4.8   |  24  |  2.9<H>    Ca    8.0<L>      13 Sep 2022 04:30  Mg     1.8     09-12    TPro  5.6<L>  /  Alb  2.9<L>  /  TBili  <0.2  /  DBili  x   /  AST  19  /  ALT  9   /  AlkPhos  102  09-13    CARDIAC MARKERS ( 13 Sep 2022 01:40 )  x     / 0.03 ng/mL / x     / x     / x      CARDIAC MARKERS ( 12 Sep 2022 17:47 )  x     / 0.03 ng/mL / x     / x     / x        COVID-19: NotDetec (09-10-22 @ 20:09)  NotDetec (07-18-22 @ 14:17)  NotDete (07-17-22 @ 13:10)    < from: [facility] Duplex Lower Ext Vein Scan, Bilat (09.11.22 @ 09:17) >  No evidence of deep venous thrombosis or superficial thrombophlebitis in   the bilateral lower extremities.    < end of copied text >  < from: TTE Echo Complete w/o Contrast w/ Doppler (09.12.22 @ 12:50) >  Summary:   1. Mildly decreasedglobal left ventricular systolic function with   global hypokinesis.   2. Left ventricular ejection fraction, by visual estimation, is 40 to   45%.   3. Severe concentric left ventricular hypertrophy.   4. Spectral Doppler shows pseudonormal pattern of left ventricular   myocardial filling (Grade II diastolic dysfunction).   5. Normal right ventricular size and function.   6. Mild biatrial enlargement   7. No hemodynamically significant valvular abnormality.   8. At least mild pulmonary hypertension based on TR velocity. IVC was   not visualized to estimate RAP.   9. Compared to the previous study 3/7/22, the LV systolic function   appears improved.    < end of copied text >  < from: 12 Lead ECG (09.12.22 @ 15:56) >  Ventricular Rate 79 BPM    Atrial Rate 79 BPM    P-R Interval 136 ms    QRS Duration 128 ms    Q-T Interval 446 ms    QTC Calculation(Bazett) 511 ms    P Axis 34 degrees    R Axis -13 degrees    T Axis 159 degrees    Diagnosis Line Normal sinus rhythm  Left ventricular hypertrophy with QRS widening  T wave abnormality, consider lateral ischemia  Abnormal ECG    < from: Xray Chest 1 View-PORTABLE IMMEDIATE (Xray Chest 1 View-PORTABLE IMMEDIATE .) (09.13.22 @ 10:19) >  Decreased to resolved bilateral opacities. No pneumothorax. No   significant pleural effusion on frontal view.    Stable cardiomediastinal silhouette.    Unchanged osseous structures.    < end of copied text >        Assessment/ Plan:    87y Female with PMH of CKD IV, NICM s/p ICD, HTN, Gout, DVT, HFmrEF admitted with exacerbation of HF. CTS contacted by primary electrophysiologist, Dr. Baez, requested insertion of LV lead.   Will add to schedule for tomorrow  Device is [employer] - rep will be notified  Keep NPO after midnight  Hold eliquis  No type and screen, patient is Jehovah Witnesss  Surgeon to obtain consent  Plan discussed with medical team, patient and daughter    CTS Attending  pt interviewed and examined  pt and daughter interviewed simultaneously (dtr by phone)  case discussed with Dr. Mckinney, and previously, Dr. Baez (EP)  pt admitted with signs and symptoms of CHF  her CDK is worsening  She is anemic and a Jehova's Witness  The patient has a long-standing BiV-ICD with an epicardial lead placed by thoracotomy,   predating 2008 (first CxR in patient's PACS folder shows device already in place)  pt is debilitated and frail  The magnitude of the surgery is excessive for the patient.   To replace the existing LV lead, it would take a re-op left thoracotomy (bloody procedure), and she would not accept a blood transfusion  General anesthesia would be required, and pt could fail extubation  I my assessment, the benefit of the surgery is overwhelmed by the risk of morbidity, complications and loss of independence  especially with the limitations imposed by the restriction on blood transfusion  I advise against surgical intervention  Please have a meaningful discussion about goals of care   60-min consult/advise/PMD-Hank/Nestor/EP/imaging/chart/labs review  -FMR

## 2022-09-13 NOTE — PROGRESS NOTE ADULT - ASSESSMENT
ASSESSMENT & PLAN  86 year old patient (Buddhism), known to have HFrEF (Echo 3/2022: EF 25-30%) s/p AICD, HTN, provoked PE/DVT on Eliquis, DLD, CKD 4 and glaucoma, presented to ED with SOB of duration. Admitted for further management of HF exacerbation.     # Acute on CHF exacerbation 2/2 HFrEF likley 2/2 HTN emergency   - Laboratory workup significant for Troponin 0.02 BNP 64579  - Chest X-ray negative for acute cardiopulmonary pathology  - PO torsemide 20 mg  - Echo: EF 40-45%  - c/w Statin and Imdur   - No ACE inhibitors/ ARB/ Entresto/ spironolactone since patient can become severely hyperkalemic as per cardiology   - Cardiology consulted   - strict i/o   - continue procardia 60 mg xl, decrease hydralazine to 10 mg TID with holding parameter <120  - repeat BNP    # B/L shoulder pain  # B/L knee pain   - Previous shoulder xray shows degenerative changes of the glenohumeral and acromioclavicular joints noted.  - Previous labs are significant for elevated uric acid and inflammatory markers, negative cyclic citrullinated peptide   - Patient was evaluated by rheumatology in the past  - c/w with pain control PRN, warm compress  - B/L knee X-rays negative for acute pathology  - PT/OT    # Anemia  - Hb 8.1 (at baseline)  - s/p EGD and colonoscopy in 2020 with non-specific gastritis and Diverticulosis - recommendation was to repeat colonoscopy in 1 year, which she did not   - NO BLOOD TRANSFUSIONS AS PER PT WISHES, JEHOVA'S WITNESS  - Monitor H+H  - Keep active T+S     #Troponemia in the setting of CKD4   - Troponin 0.02 (around baseline)  - F/u repeat troponin       #CKD stage 4  - Cr 2.5 eGFR 18 (at baseline)   - Follows Dr. Urrutia   - Avoid nephrotoxic agents   - c/w calcium acetate     #hyperkalemia- 5.5- lokelma 10 mg x1     #HTN  - continue Hydralazine   - switched to procardia starting 9/12  - NO ACE inhibitors/ ARB/ Entresto/ spironolactone since patient can become severely hyperkalemic     #Hx of PE/DVT  - Hx of provoked PE in the past and soleal vein thrombosis years ago?  - c/w Eliquis 2.5 mg bid     #HDL  - c/w atorvastatin 20    #Glaucoma  - c/w eye drops    #Progress Note Handoff  Pending (specify):  chf management echo, cardiology, bp  Disposition: home vs snf, PT  Anticipated date: 9/13   ASSESSMENT & PLAN  86 year old patient ([employer]alism), known to have HFrEF (Echo 3/2022: EF 25-30%) s/p AICD, HTN, provoked PE/DVT on Eliquis, DLD, CKD 4 and glaucoma, presented to ED with SOB of duration. Admitted for further management of HF exacerbation.     # Acute on CHF exacerbation 2/2 HFrEF likley 2/2 HTN emergency   - Laboratory workup significant for Troponin 0.02 BNP 07469  - Chest X-ray negative for acute cardiopulmonary pathology  - PO torsemide 20 mg  - Echo: EF 40-45%  - c/w Statin and Imdur   - No ACE inhibitors/ ARB/ Entresto/ spironolactone since patient can become severely hyperkalemic as per cardiology   - Cardiology consulted   - strict i/o   - continue procardia 60 mg xl, decrease hydralazine to 10 mg TID with holding parameter <120  - repeat BNP    # B/L shoulder pain  # B/L knee pain   - Previous shoulder xray shows degenerative changes of the glenohumeral and acromioclavicular joints noted.  - Previous labs are significant for elevated uric acid and inflammatory markers, negative cyclic citrullinated peptide   - Patient was evaluated by rheumatology in the past  - c/w with pain control PRN, warm compress  - B/L knee X-rays negative for acute pathology  - PT/OT    # Anemia  - Hb 8.2  - s/p EGD and colonoscopy in 2020 with non-specific gastritis and Diverticulosis - recommendation was to repeat colonoscopy in 1 year, which she did not   - NO BLOOD TRANSFUSIONS AS PER PT WISHES, JEHOVA'S WITNESS  - Monitor H+H  - Keep active T+S   - Iron studies    # Troponemia in the setting of CKD4   - Troponin 0.03 this AM (around baseline)    # CKD stage 4  - Cr 2.5 eGFR 18 (at baseline)   - Follows Dr. Urrutia   - Avoid nephrotoxic agents   - c/w calcium acetate     # Hyperkalemia  - K 4.8 this AM  - lokelma 10 mg x1     # HTN  - continue Hydralazine   - switched to procardia starting 9/12  - NO ACE inhibitors/ ARB/ Entresto/ spironolactone since patient can become severely hyperkalemic     # Hx of PE/DVT  - Hx of provoked PE in the past and soleal vein thrombosis years ago  - c/w Eliquis 2.5 mg bid     # HDL  - c/w atorvastatin 20    # Glaucoma  - c/w eye drops    #Progress Note Handoff  Pending (specify):  chf management, cardiology, bp  Disposition: home vs snf, PT     ASSESSMENT & PLAN  86 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 Admitted for further management of HF exacerbation.     # Acute on CHF exacerbation 2/2 HFrEF likley 2/2 HTN emergency   - Laboratory workup significant for Troponin 0.02 BNP 57224  - Chest X-ray negative for acute cardiopulmonary pathology  - PO torsemide 20 mg  - Echo: EF 40-45%  - c/w Statin and Imdur   - No ACE inhibitors/ ARB/ Entresto/ spironolactone since patient can become severely hyperkalemic as per cardiology   - Cardiology consulted   - strict i/o   - continue procardia 60 mg xl, decrease hydralazine to 10 mg TID with holding parameter <120  - repeat BNP 01776  - CT surgery planning for placement of AICD lead tomorrow (9/14).  - NPO after midnight    # B/L shoulder pain  # B/L knee pain   - Previous shoulder xray shows degenerative changes of the glenohumeral and acromioclavicular joints noted.  - Previous labs are significant for elevated uric acid and inflammatory markers, negative cyclic citrullinated peptide   - Patient was evaluated by rheumatology in the past  - c/w with pain control PRN, warm compress  - B/L knee X-rays negative for acute pathology  - PT/OT    # Anemia  - Hb 8.2  - s/p EGD and colonoscopy in 2020 with non-specific gastritis and Diverticulosis - recommendation was to repeat colonoscopy in 1 year, which she did not   - NO BLOOD TRANSFUSIONS AS PER PT WISHES, JEHOVA'S WITNESS  - Monitor H+H  - Keep active T+S   - Iron levels low    # Troponemia in the setting of CKD4   - Troponin 0.03 this AM (around baseline)    # CKD stage 4  - Cr 2.5 eGFR 18 (at baseline)   - Follows Dr. Urrutia   - Avoid nephrotoxic agents   - c/w calcium acetate     # Hyperkalemia  - K 4.8 this AM  - lokelma 10 mg x1     # HTN  - continue Hydralazine   - switched to procardia starting 9/12  - NO ACE inhibitors/ ARB/ Entresto/ spironolactone since patient can become severely hyperkalemic     # Hx of PE/DVT  - Hx of provoked PE in the past and soleal vein thrombosis years ago  - c/w Eliquis 2.5 mg bid     # HDL  - c/w atorvastatin 20    # Glaucoma  - c/w eye drops    #Progress Note Handoff  Pending (specify):  chf management, cardiology, bp  Disposition: home vs snf, PT

## 2022-09-13 NOTE — PROGRESS NOTE ADULT - ASSESSMENT
87 year old patient (Quaker), known to have HFrEF (Echo 3/2022: EF 25-30%) s/p AICD, HTN, provoked PE/DVT on Eliquis, DLD, CKD 4 and glaucoma, presented to ED with SOB of duration. Admitted for further management of HF exacerbation.     #Acute on chronic HFrEF due to uncontrolled hypertension  patient was treated eith IV lasix and fluid status improved.  CXR showing improvement; BNP still elevated; but euvolemic on clincial exam  continue torsemide  - Echo 3/2022: EF 25-30%  - echo 9/12: EF 40-45%; severe LVH, gr 1 D dysfunction  - c/w Statin and Imdur   - No ACE inhibitors/ ARB/ Entresto/ spironolactone since patient can become severely hyperkalemic as per cardiology   - Cardiology evaluated  - cardiology EP evaluated- device interrogated- functioning properly  - CTS following- planning for replacement of LV leads tomorrow- NPO after midnight; hold eliquis  - strict i/o   - continue procardia 60 mg xl, decrease hydralazine to 10 mg TID with holding parameter <120    #B/L shoulder pain  #B/L knee pain   - Previous shoulder xray shows degenerative changes of the glenohumeral and acromioclavicular joints noted.  - Previous labs are significant for elevated uric acid and inflammatory markers, negative cyclic citrullinated peptide   - Patient was evaluated by rheumatology in the past  - c/w with pain control PRN, warm compress  - B/L knee X-rays :No acute fracture or acute articular abnormality. Severe tricompartmental   DJD, more prominent since the prior exam.  - PT/OT    #Anemia  - Hb 8.1 (at baseline)  - s/p EGD and colonoscopy in 2020 with non-specific gastritis and Diverticulosis - recommendation was to repeat colonoscopy in 1 year, which she did not   - NO BLOOD TRANSFUSIONS AS PER PT WISHES, JEHOVA'S WITNESS  - Monitor H+H  - Keep active T+S   - monitor iron stuides- replace if low    #Troponemia in the setting of CKD4   - Troponin 0.02 (around baseline)  - F/u repeat troponin       #CKD stage 4  - Cr 2.5 eGFR 18 (at baseline)   - Follows Dr. Urrutia   - Avoid nephrotoxic agents   - c/w calcium acetate   - monitor BMP    #hyperkalemia-improving- monitor    #HTN- uncontrolled on presentation- well controlled now  - continue Hydralazine   - switched to procardia starting 9/12  - NO ACE inhibitors/ ARB/ Entresto/ spironolactone since patient can become severely hyperkalemic     #Hx of PE/DVT  - Hx of provoked PE in the past and soleal vein thrombosis years ago?  - c/w Eliquis 2.5 mg bid - on hold now for planned lead placement by CT surgery tomorrow    #HDL  - c/w atorvastatin 20    #Glaucoma  - c/w eye drops    #Progress Note Handoff    Pending (specify):  CT surgery plan for lead placement tomorrow, monitor cbc, BMP, iron studies  PT evaluation

## 2022-09-14 LAB
ALBUMIN SERPL ELPH-MCNC: 3 G/DL — LOW (ref 3.5–5.2)
ALP SERPL-CCNC: 100 U/L — SIGNIFICANT CHANGE UP (ref 30–115)
ALT FLD-CCNC: 8 U/L — SIGNIFICANT CHANGE UP (ref 0–41)
ANION GAP SERPL CALC-SCNC: 10 MMOL/L — SIGNIFICANT CHANGE UP (ref 7–14)
AST SERPL-CCNC: 18 U/L — SIGNIFICANT CHANGE UP (ref 0–41)
BASOPHILS # BLD AUTO: 0 K/UL — SIGNIFICANT CHANGE UP (ref 0–0.2)
BASOPHILS NFR BLD AUTO: 0 % — SIGNIFICANT CHANGE UP (ref 0–1)
BILIRUB SERPL-MCNC: 0.2 MG/DL — SIGNIFICANT CHANGE UP (ref 0.2–1.2)
BUN SERPL-MCNC: 63 MG/DL — CRITICAL HIGH (ref 10–20)
CALCIUM SERPL-MCNC: 8.3 MG/DL — LOW (ref 8.4–10.5)
CHLORIDE SERPL-SCNC: 103 MMOL/L — SIGNIFICANT CHANGE UP (ref 98–110)
CO2 SERPL-SCNC: 24 MMOL/L — SIGNIFICANT CHANGE UP (ref 17–32)
CREAT SERPL-MCNC: 3 MG/DL — HIGH (ref 0.7–1.5)
EGFR: 15 ML/MIN/1.73M2 — LOW
EOSINOPHIL # BLD AUTO: 0.34 K/UL — SIGNIFICANT CHANGE UP (ref 0–0.7)
EOSINOPHIL NFR BLD AUTO: 5.2 % — SIGNIFICANT CHANGE UP (ref 0–8)
FERRITIN SERPL-MCNC: 133 NG/ML — SIGNIFICANT CHANGE UP (ref 15–150)
GIANT PLATELETS BLD QL SMEAR: PRESENT — SIGNIFICANT CHANGE UP
GLUCOSE SERPL-MCNC: 85 MG/DL — SIGNIFICANT CHANGE UP (ref 70–99)
HCT VFR BLD CALC: 25.7 % — LOW (ref 37–47)
HGB BLD-MCNC: 8.5 G/DL — LOW (ref 12–16)
LYMPHOCYTES # BLD AUTO: 2.03 K/UL — SIGNIFICANT CHANGE UP (ref 1.2–3.4)
LYMPHOCYTES # BLD AUTO: 30.7 % — SIGNIFICANT CHANGE UP (ref 20.5–51.1)
MAGNESIUM SERPL-MCNC: 1.7 MG/DL — LOW (ref 1.8–2.4)
MANUAL SMEAR VERIFICATION: SIGNIFICANT CHANGE UP
MCHC RBC-ENTMCNC: 29.5 PG — SIGNIFICANT CHANGE UP (ref 27–31)
MCHC RBC-ENTMCNC: 33.1 G/DL — SIGNIFICANT CHANGE UP (ref 32–37)
MCV RBC AUTO: 89.2 FL — SIGNIFICANT CHANGE UP (ref 81–99)
MONOCYTES # BLD AUTO: 1.1 K/UL — HIGH (ref 0.1–0.6)
MONOCYTES NFR BLD AUTO: 16.7 % — HIGH (ref 1.7–9.3)
NEUTROPHILS # BLD AUTO: 3.13 K/UL — SIGNIFICANT CHANGE UP (ref 1.4–6.5)
NEUTROPHILS NFR BLD AUTO: 47.4 % — SIGNIFICANT CHANGE UP (ref 42.2–75.2)
PHOSPHATE SERPL-MCNC: 3.8 MG/DL — SIGNIFICANT CHANGE UP (ref 2.1–4.9)
PLAT MORPH BLD: NORMAL — SIGNIFICANT CHANGE UP
PLATELET # BLD AUTO: 185 K/UL — SIGNIFICANT CHANGE UP (ref 130–400)
POTASSIUM SERPL-MCNC: 4.8 MMOL/L — SIGNIFICANT CHANGE UP (ref 3.5–5)
POTASSIUM SERPL-SCNC: 4.8 MMOL/L — SIGNIFICANT CHANGE UP (ref 3.5–5)
PROT SERPL-MCNC: 5.5 G/DL — LOW (ref 6–8)
RBC # BLD: 2.88 M/UL — LOW (ref 4.2–5.4)
RBC # FLD: 13.8 % — SIGNIFICANT CHANGE UP (ref 11.5–14.5)
RBC BLD AUTO: NORMAL — SIGNIFICANT CHANGE UP
SMUDGE CELLS # BLD: PRESENT — SIGNIFICANT CHANGE UP
SODIUM SERPL-SCNC: 137 MMOL/L — SIGNIFICANT CHANGE UP (ref 135–146)
WBC # BLD: 6.61 K/UL — SIGNIFICANT CHANGE UP (ref 4.8–10.8)
WBC # FLD AUTO: 6.61 K/UL — SIGNIFICANT CHANGE UP (ref 4.8–10.8)

## 2022-09-14 PROCEDURE — 99233 SBSQ HOSP IP/OBS HIGH 50: CPT

## 2022-09-14 RX ORDER — APIXABAN 2.5 MG/1
2.5 TABLET, FILM COATED ORAL EVERY 12 HOURS
Refills: 0 | Status: DISCONTINUED | OUTPATIENT
Start: 2022-09-14 | End: 2022-09-15

## 2022-09-14 RX ORDER — MAGNESIUM SULFATE 500 MG/ML
2 VIAL (ML) INJECTION ONCE
Refills: 0 | Status: COMPLETED | OUTPATIENT
Start: 2022-09-14 | End: 2022-09-14

## 2022-09-14 RX ORDER — IRON SUCROSE 20 MG/ML
200 INJECTION, SOLUTION INTRAVENOUS EVERY 24 HOURS
Refills: 0 | Status: COMPLETED | OUTPATIENT
Start: 2022-09-14 | End: 2022-09-15

## 2022-09-14 RX ADMIN — Medication 667 MILLIGRAM(S): at 12:46

## 2022-09-14 RX ADMIN — Medication 60 MILLIGRAM(S): at 05:20

## 2022-09-14 RX ADMIN — Medication 3 MILLILITER(S): at 20:32

## 2022-09-14 RX ADMIN — LATANOPROST 1 DROP(S): 0.05 SOLUTION/ DROPS OPHTHALMIC; TOPICAL at 17:47

## 2022-09-14 RX ADMIN — LIDOCAINE 1 PATCH: 4 CREAM TOPICAL at 12:46

## 2022-09-14 RX ADMIN — Medication 667 MILLIGRAM(S): at 17:46

## 2022-09-14 RX ADMIN — Medication 20 MILLIGRAM(S): at 05:21

## 2022-09-14 RX ADMIN — ISOSORBIDE MONONITRATE 60 MILLIGRAM(S): 60 TABLET, EXTENDED RELEASE ORAL at 12:46

## 2022-09-14 RX ADMIN — Medication 10 MILLIGRAM(S): at 21:39

## 2022-09-14 RX ADMIN — APIXABAN 2.5 MILLIGRAM(S): 2.5 TABLET, FILM COATED ORAL at 17:46

## 2022-09-14 RX ADMIN — LATANOPROST 1 DROP(S): 0.05 SOLUTION/ DROPS OPHTHALMIC; TOPICAL at 05:23

## 2022-09-14 RX ADMIN — Medication 10 MILLIGRAM(S): at 05:20

## 2022-09-14 RX ADMIN — Medication 667 MILLIGRAM(S): at 09:16

## 2022-09-14 RX ADMIN — Medication 1 MILLIGRAM(S): at 12:46

## 2022-09-14 RX ADMIN — Medication 25 GRAM(S): at 12:45

## 2022-09-14 RX ADMIN — IRON SUCROSE 110 MILLIGRAM(S): 20 INJECTION, SOLUTION INTRAVENOUS at 17:47

## 2022-09-14 RX ADMIN — ATORVASTATIN CALCIUM 20 MILLIGRAM(S): 80 TABLET, FILM COATED ORAL at 21:39

## 2022-09-14 NOTE — PROGRESS NOTE ADULT - SUBJECTIVE AND OBJECTIVE BOX
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 Patient endorses orthopnea. Patient also reports worsening pain in her bilateral shoulders and knees. To note patient was recently admitted on 7/2022 for bilateral shoulder pain. She denies fever, chills, headache, upper respiratory symptoms, abdominal or urinary symptoms. No chest pain, no palpitations. No sick contacts. She is compliant with her medications.     In ED, vitals wnl  Laboratory workup significant for Hb 8.1 (at baseline) K 5.2 Cr 2.5 eGFR 18 (at baseline)   Troponin 0.02 BNP 30109  Chest X-ray: congestion (unofficial read)   She received 1 dose of IV Lasix 40 mg in ED.     Overnight events     No acute overnight events    Subjective complaints     No complaints                                            ----------------------------------------------------------  OBJECTIVE  PAST MEDICAL & SURGICAL HISTORY  Chronic kidney disease    Pacemaker    Hypertension    Gout    Diverticulitis  sigmoid    Diastolic heart failure    Rheumatoid arthritis    DVT, lower extremity  Bilateral    Artificial pacemaker    History of appendectomy    History of tonsillectomy    History of hysterectomy    H/O hernia repair                                              -----------------------------------------------------------  ALLERGIES:  Keflex (Swelling)                                            ------------------------------------------------------------    HOME MEDICATIONS  Home Medications:  amLODIPine 5 mg oral tablet: 1 tab(s) orally once a day (10 Sep 2022 21:37)  apixaban 2.5 mg oral tablet: 1 tab(s) orally 2 times a day (10 Sep 2022 21:37)  calcium (as carbonate) 600 mg oral tablet: 1 tab(s) orally 3 times a day (10 Sep 2022 21:37)  calcium acetate 667 mg oral capsule: 2 cap(s) orally 3 times a day (with meals) (10 Sep 2022 21:37)  folic acid 1 mg oral tablet: 1 tab(s) orally once a day (10 Sep 2022 21:37)  furosemide 40 mg oral tablet: 1 tab(s) orally once a day (10 Sep 2022 21:37)  hydrALAZINE 25 mg oral tablet: 1 tab(s) orally 3 times a day (10 Sep 2022 21:37)  isosorbide mononitrate 60 mg oral tablet, extended release: 1 tab(s) orally once a day (in the morning) (10 Sep 2022 21:37)  latanoprost 0.005% ophthalmic solution: 1 drop(s) to each affected eye 2 times a day (10 Sep 2022 21:37)  Lipitor 20 mg oral tablet: 1 tab(s) orally once a day (at bedtime) (10 Sep 2022 21:37)                           MEDICATIONS:  STANDING MEDICATIONS  ALBUTerol    90 MICROgram(s) HFA Inhaler 1 Puff(s) Inhalation once  albuterol/ipratropium for Nebulization 3 milliLiter(s) Nebulizer every 6 hours  apixaban 2.5 milliGRAM(s) Oral two times a day  atorvastatin 20 milliGRAM(s) Oral at bedtime  calcium acetate 667 milliGRAM(s) Oral three times a day with meals  folic acid 1 milliGRAM(s) Oral daily  hydrALAZINE 10 milliGRAM(s) Oral three times a day  isosorbide   mononitrate ER Tablet (IMDUR) 60 milliGRAM(s) Oral daily  latanoprost 0.005% Ophthalmic Solution 1 Drop(s) Both EYES <User Schedule>  lidocaine   4% Patch 1 Patch Transdermal daily  NIFEdipine XL 60 milliGRAM(s) Oral daily  torsemide 20 milliGRAM(s) Oral daily    PRN MEDICATIONS  acetaminophen     Tablet .. 650 milliGRAM(s) Oral every 6 hours PRN  ALBUTerol    0.083%. 2.5 milliGRAM(s) Nebulizer once PRN  benzocaine 20% Spray 1 Spray(s) Topical three times a day PRN                                            ------------------------------------------------------------  VITAL SIGNS: Last 24 Hours  T(C): 36.5 (13 Sep 2022 05:16), Max: 37.1 (12 Sep 2022 12:04)  T(F): 97.7 (13 Sep 2022 05:16), Max: 98.8 (12 Sep 2022 12:04)  HR: 76 (13 Sep 2022 07:46) (76 - 89)  BP: 129/65 (13 Sep 2022 05:16) (102/53 - 129/65)  BP(mean): --  RR: 18 (13 Sep 2022 07:46) (18 - 20)  SpO2: 98% (13 Sep 2022 07:46) (95% - 100%)                                             --------------------------------------------------------------  LABS:                        8.2    6.84  )-----------( 175      ( 13 Sep 2022 04:30 )             25.4     09-13    134<L>  |  100  |  63<HH>  ----------------------------<  97  4.8   |  24  |  2.9<H>    Ca    8.0<L>      13 Sep 2022 04:30  Mg     1.8     09-12    TPro  5.6<L>  /  Alb  2.9<L>  /  TBili  <0.2  /  DBili  x   /  AST  19  /  ALT  9   /  AlkPhos  102  09-13      Troponin T, Serum: 0.03 ng/mL *HH* (09-13-22 @ 01:40)  Troponin T, Serum: 0.03 ng/mL *HH* (09-12-22 @ 17:47)      CARDIAC MARKERS ( 13 Sep 2022 01:40 )  x     / 0.03 ng/mL / x     / x     / x      CARDIAC MARKERS ( 12 Sep 2022 17:47 )  x     / 0.03 ng/mL / x     / x     / x                                                --------------------------------------------------------------    PHYSICAL EXAM:  GENERAL: NAD, well-developed  HEAD:  Atraumatic, Normocephalic  EYES: conjunctiva and sclera clear  NECK: Supple, No JVD  CHEST/LUNG: Clear to auscultation bilaterally; No wheeze  HEART: Regular rate and rhythm; No murmurs, rubs, or gallops  ABDOMEN: Soft, Nontender, Nondistended  EXTREMITIES:  No clubbing, cyanosis, or edema  PSYCH: AAOx3                                             --------------------------------------------------------------

## 2022-09-14 NOTE — PROGRESS NOTE ADULT - SUBJECTIVE AND OBJECTIVE BOX
RACHEL SUMMERS  87y  Female      Patient is a 87y old  Female who presents with a chief complaint of CHF exacerbation (14 Sep 2022 11:35)      INTERVAL HPI/OVERNIGHT EVENTS:  pt seen this am   -no acute events notified to me   -CT sx procedure cancelled - see their note - risk of procedure is higher than benefits considering current comorbidities (also, Muslim and will not accept blood products if needed during procedure)  -ok with d/c planning     REVIEW OF SYSTEMS:  -no new complaints     --Vital Signs Last 24 Hrs  T(C): 36.9 (14 Sep 2022 14:35), Max: 37.1 (13 Sep 2022 21:55)  T(F): 98.5 (14 Sep 2022 14:35), Max: 98.8 (13 Sep 2022 21:55)  HR: 80 (14 Sep 2022 14:35) (80 - 93)  BP: 102/57 (14 Sep 2022 14:35) (102/57 - 145/73)  BP(mean): --  RR: 18 (14 Sep 2022 14:35) (18 - 20)  SpO2: 96% (14 Sep 2022 07:50) (96% - 97%)    Parameters below as of 14 Sep 2022 07:50  Patient On (Oxygen Delivery Method): room air        PHYSICAL EXAM:  GENERAL: NAD, comfortable in bed   HEAD:  Atraumatic, Normocephalic  EYES: EOMI, PERRLA, conjunctiva and sclera clear  NERVOUS SYSTEM:  Alert & Oriented X 3  CHEST/LUNG: Clear b/l  CV/HEART: Regular rate and rhythm; No murmurs, rubs, or gallops  GI/ABDOMEN: Soft abdomen   EXTREMITIES:  2+ Peripheral Pulses, No clubbing, cyanosis, or edema  SKIN: No rashes or lesions    LAB:                        8.5    6.61  )-----------( 185      ( 14 Sep 2022 07:31 )             25.7     09-14    137  |  103  |  63<HH>  ----------------------------<  85  4.8   |  24  |  3.0<H>    Ca    8.3<L>      14 Sep 2022 07:31  Phos  3.8     09-14  Mg     1.7     09-14    TPro  5.5<L>  /  Alb  3.0<L>  /  TBili  0.2  /  DBili  x   /  AST  18  /  ALT  8   /  AlkPhos  100  09-14    CARDIAC MARKERS ( 13 Sep 2022 01:40 )  x     / 0.03 ng/mL / x     / x     / x      CARDIAC MARKERS ( 12 Sep 2022 17:47 )  x     / 0.03 ng/mL / x     / x     / x        Daily Height in cm: 160 (14 Sep 2022 06:44)    Daily   CAPILLARY BLOOD GLUCOSE    LIVER FUNCTIONS - ( 14 Sep 2022 07:31 )  Alb: 3.0 g/dL / Pro: 5.5 g/dL / ALK PHOS: 100 U/L / ALT: 8 U/L / AST: 18 U/L / GGT: x           RADIOLOGY:    Imaging Personally visualized and Reviewed:  [ y ] YES  [ ] NO    HEALTH ISSUES - PROBLEM Dx:    MEDS:  acetaminophen     Tablet .. 650 milliGRAM(s) Oral every 6 hours PRN  ALBUTerol    0.083%. 2.5 milliGRAM(s) Nebulizer once PRN  ALBUTerol    90 MICROgram(s) HFA Inhaler 1 Puff(s) Inhalation once  albuterol/ipratropium for Nebulization 3 milliLiter(s) Nebulizer every 6 hours  apixaban 2.5 milliGRAM(s) Oral every 12 hours  atorvastatin 20 milliGRAM(s) Oral at bedtime  benzocaine 20% Spray 1 Spray(s) Topical three times a day PRN  calcium acetate 667 milliGRAM(s) Oral three times a day with meals  folic acid 1 milliGRAM(s) Oral daily  hydrALAZINE 10 milliGRAM(s) Oral three times a day  iron sucrose IVPB 200 milliGRAM(s) IV Intermittent every 24 hours  isosorbide   mononitrate ER Tablet (IMDUR) 60 milliGRAM(s) Oral daily  latanoprost 0.005% Ophthalmic Solution 1 Drop(s) Both EYES <User Schedule>  lidocaine   4% Patch 1 Patch Transdermal daily  NIFEdipine XL 60 milliGRAM(s) Oral daily  torsemide 20 milliGRAM(s) Oral daily

## 2022-09-14 NOTE — PROGRESS NOTE ADULT - SUBJECTIVE AND OBJECTIVE BOX
Evaluation for ICD lead placement  SURGEON(s): JASMIN Blakely  SUBJECTIVE ASSESSMENT: no chest pain or distress    Vital Signs Last 24 Hrs  T(F): 98.8 (14 Sep 2022 06:44), Max: 98.8 (13 Sep 2022 21:55)  HR: 84 (14 Sep 2022 06:44) (84 - 96)  BP: 145/73 (14 Sep 2022 06:44) (108/53 - 145/73)  RR: 18 (14 Sep 2022 06:44) (18 - 20)  SpO2: 97% (14 Sep 2022 06:44) (97% - 97%)    I&O's Detail    Net:   I&O's Detail    CAPILLARY BLOOD GLUCOSE  LABS:                        8.5<L>  6.61  )-----------( 185      ( 14 Sep 2022 07:31 )             25.7<L>                        8.2<L>  6.84  )-----------( 175      ( 13 Sep 2022 04:30 )             25.4<L>    09-14    137  |  103  |  63<HH>  ----------------------------<  85  4.8   |  24  |  3.0<H>  09-13    134<L>  |  100  |  63<HH>  ----------------------------<  97  4.8   |  24  |  2.9<H>    Ca    8.3<L>      14 Sep 2022 07:31  Phos  3.8     09-14  g     1.7     09-14    TPro  5.5<L> [6.0 - 8.0]  /  Alb  3.0<L> [3.5 - 5.2]  /  TBili  0.2 [0.2 - 1.2]  /  DBili  x   /  AST  18 [0 - 41]  /  ALT  8 [0 - 41]  /  AlkPhos  100 [30 - 115]  09-14    PT/INR - ( 13 Sep 2022 22:14 )   PT: ;   INR: 1.33 ratio       PTT - ( 13 Sep 2022 22:14 )  PTT:34.1 sec      MEDICATIONS  (STANDING):  ALBUTerol    90 MICROgram(s) HFA Inhaler 1 Puff(s) Inhalation once  albuterol/ipratropium for Nebulization 3 milliLiter(s) Nebulizer every 6 hours  atorvastatin 20 milliGRAM(s) Oral at bedtime  calcium acetate 667 milliGRAM(s) Oral three times a day with meals  folic acid 1 milliGRAM(s) Oral daily  hydrALAZINE 10 milliGRAM(s) Oral three times a day  isosorbide   mononitrate ER Tablet (IMDUR) 60 milliGRAM(s) Oral daily  latanoprost 0.005% Ophthalmic Solution 1 Drop(s) Both EYES <User Schedule>  lidocaine   4% Patch 1 Patch Transdermal daily  magnesium sulfate  IVPB 2 Gram(s) IV Intermittent once  NIFEdipine XL 60 milliGRAM(s) Oral daily  torsemide 20 milliGRAM(s) Oral daily    MEDICATIONS  (PRN):  acetaminophen     Tablet .. 650 milliGRAM(s) Oral every 6 hours PRN Mild Pain (1 - 3), Moderate Pain (4 - 6)  ALBUTerol    0.083%. 2.5 milliGRAM(s) Nebulizer once PRN Shortness of Breath and/or Wheezing  benzocaine 20% Spray 1 Spray(s) Topical three times a day PRN sore throat    Allergies    Keflex (Swelling)    Intolerances      Ambulation/Activity Status: ambulate with assistance     Assessment/Plan:  Surgery cancelled  Please resume diet and Eliquis      CTS Attending  pt interviewed and examined  pt and daughter interviewed simultaneously (dtr by phone)  case discussed with Dr. Mckinney, and previously, Dr. Baez (EP)  pt admitted with signs and symptoms of CHF  her CDK is worsening  She is anemic and a Jehova's Witness  The patient has a long-standing BiV-ICD with an epicardial lead placed by thoracotomy,   predating 2008 (first CxR in patient's PACS folder shows device already in place)  pt is debilitated and frail  The magnitude of the surgery is excessive for the patient.   To replace the existing LV lead, it would take a re-op left thoracotomy (bloody procedure), and she would not accept a blood transfusion  General anesthesia would be required, and pt could fail extubation  I my assessment, the benefit of the surgery is overwhelmed by the risk of morbidity, complications and loss of independence  especially with the limitations imposed by the restriction on blood transfusion  I advise against surgical intervention  Please have a meaningful discussion about goals of care   -FMR      Disposition: as per medicine

## 2022-09-14 NOTE — PROGRESS NOTE ADULT - ASSESSMENT
ASSESSMENT & PLAN  86 year old patient (Judaism), known to have HFrEF (Echo 3/2022: EF 25-30%) s/p AICD, HTN, provoked PE/DVT on Eliquis, DLD, CKD 4 and glaucoma, presented to ED with SOB of duration. Admitted for further management of HF exacerbation.     # Acute on CHF exacerbation 2/2 HFrEF likley 2/2 HTN emergency   - Laboratory workup significant for Troponin 0.02 BNP 43774  - Chest X-ray negative for acute cardiopulmonary pathology  - PO torsemide 20 mg  - Echo: EF 40-45%  - c/w Statin and Imdur   - No ACE inhibitors/ ARB/ Entresto/ spironolactone since patient can become severely hyperkalemic as per cardiology   - Cardiology following  - strict i/o   - continue procardia 60 mg xl, decrease hydralazine to 10 mg TID with holding parameter <120  - repeat BNP 59029  - CT surgery today for placement of AICD lead (9/14).    # B/L shoulder pain  # B/L knee pain   - Previous shoulder xray shows degenerative changes of the glenohumeral and acromioclavicular joints noted.  - Previous labs are significant for elevated uric acid and inflammatory markers, negative cyclic citrullinated peptide   - Patient was evaluated by rheumatology in the past  - c/w with pain control PRN, warm compress  - B/L knee X-rays negative for acute pathology  - PT/OT    # Anemia  - Hb 8.5  - s/p EGD and colonoscopy in 2020 with non-specific gastritis and Diverticulosis - recommendation was to repeat colonoscopy in 1 year, which she did not   - NO BLOOD TRANSFUSIONS AS PER PT WISHES, JEHOVA'S WITNESS  - Monitor H+H  - Iron levels low. Consider PO Iron after discharge from PACU    # Troponemia in the setting of CKD4   - Troponin stable at 0.03 (around baseline)    # CKD stage 4  - Cr 2.5 eGFR 18 (at baseline)   - Follows Dr. Urrutia   - Avoid nephrotoxic agents   - c/w calcium acetate     # Hyperkalemia  - K 4.8 this AM  - lokelma 10 mg x1     # HTN  - continue Hydralazine   - switched to procardia starting 9/12  - NO ACE inhibitors/ ARB/ Entresto/ spironolactone since patient can become severely hyperkalemic     # Hx of PE/DVT  - Hx of provoked PE in the past and soleal vein thrombosis years ago  - c/w Eliquis 2.5 mg bid     # HDL  - c/w atorvastatin 20    # Glaucoma  - c/w eye drops    #Progress Note Handoff  Pending (specify):  chf management, cardiology, bp  Disposition: home vs snf, PT     ASSESSMENT & PLAN  86 year old patient (Mandaeism), known to have HFrEF (Echo 3/2022: EF 25-30%) s/p AICD, HTN, provoked PE/DVT on Eliquis, DLD, CKD 4 and glaucoma, presented to ED with SOB of duration. Admitted for further management of HF exacerbation.     # Acute on CHF exacerbation 2/2 HFrEF likley 2/2 HTN emergency   - Laboratory workup significant for Troponin 0.02 BNP 26946  - Chest X-ray negative for acute cardiopulmonary pathology  - PO torsemide 20 mg  - Echo: EF 40-45%  - c/w Statin and Imdur   - No ACE inhibitors/ ARB/ Entresto/ spironolactone since patient can become severely hyperkalemic as per cardiology   - Cardiology following  - strict i/o   - continue procardia 60 mg xl, decrease hydralazine to 10 mg TID with holding parameter <120  - repeat BNP 13936  - CT surgery cancelled today (9/14). Patient deemed very high risk    # B/L shoulder pain  # B/L knee pain   - Previous shoulder xray shows degenerative changes of the glenohumeral and acromioclavicular joints noted.  - Previous labs are significant for elevated uric acid and inflammatory markers, negative cyclic citrullinated peptide   - Patient was evaluated by rheumatology in the past  - c/w with pain control PRN, warm compress  - B/L knee X-rays negative for acute pathology  - PT/OT    # Anemia  - Hb 8.5  - s/p EGD and colonoscopy in 2020 with non-specific gastritis and Diverticulosis - recommendation was to repeat colonoscopy in 1 year, which she did not   - NO BLOOD TRANSFUSIONS AS PER PT WISHES, JEHOVA'S WITNESS  - Monitor H+H  - IV Iron    # Troponemia in the setting of CKD4   - Troponin stable at 0.03 (around baseline)    # CKD stage 4  - Cr 2.5 eGFR 18 (at baseline)   - Follows Dr. Urrutia   - Avoid nephrotoxic agents   - c/w calcium acetate     # Hyperkalemia  - K 4.8 this AM  - lokelma 10 mg x1     # HTN  - continue Hydralazine   - switched to procardia starting 9/12  - NO ACE inhibitors/ ARB/ Entresto/ spironolactone since patient can become severely hyperkalemic     # Hx of PE/DVT  - Hx of provoked PE in the past and soleal vein thrombosis years ago  - c/w Eliquis 2.5 mg bid     # HDL  - c/w atorvastatin 20    # Glaucoma  - c/w eye drops    #Progress Note Handoff  Pending (specify):  chf management, cardiology, bp  Disposition: home vs snf, PT

## 2022-09-14 NOTE — PROGRESS NOTE ADULT - ASSESSMENT
87 year old patient (Zoroastrian), known to have HFrEF (Echo 3/2022: EF 25-30%) s/p AICD, HTN, provoked PE/DVT on Eliquis, DLD, CKD 4 and glaucoma, presented to ED with SOB of duration. Admitted for further management of HF exacerbation.     ·	Acute on chronic CHFrEF due to uncontrolled hypertensioncontinue torsemide  ·	Debility - B/L shoulder and B/L knee pain   ·	CKD stage IV  ·	Anemia of chronic illness   ·	HTN  ·	Chronic PE / DVT     -CT sx following - ICD lead placement procedure cancelled due to high risk of morbidity and complications post op   -diet and AC resumed   -continue with Torsemide - monitor daily weights and I and Os -( Echo 3/2022: EF 25-30%; echo 9/12: EF 40-45%; severe LVH, gr 1 D dysfunction)  -c/w Statin and Imdur   -No ACE inhibitors/ ARB/ Entresto/ spironolactone since patient can become severely hyperkalemic as per cardiology   -Previous shoulder xray shows degenerative changes of the glenohumeral and acromioclavicular joints noted.  -Previous labs are significant for elevated uric acid and inflammatory markers, negative cyclic citrullinated peptide   -Patient was evaluated by rheumatology in the past  -c/w with pain control PRN, warm compress  -B/L knee X-rays :No acute fracture or acute articular abnormality. Severe tricompartmental   DJD, more prominent since the prior exam - PT/OT as tolerated   -s/p EGD and colonoscopy in 2020 with non-specific gastritis and Diverticulosis - recommendation was to repeat colonoscopy in 1 year, which she did not   -NO BLOOD TRANSFUSIONS AS PER PT WISHES, Restorationism  -ok with Iron Infusion   -continue with rest of maintenance meds     DISPO: home with HHA in 24 hrs - CM aware   -staff updated family     Attending Physician Dr. Judy Saldaña # [id number]

## 2022-09-15 ENCOUNTER — TRANSCRIPTION ENCOUNTER (OUTPATIENT)
Age: 87
End: 2022-09-15

## 2022-09-15 VITALS
TEMPERATURE: 98 F | DIASTOLIC BLOOD PRESSURE: 60 MMHG | SYSTOLIC BLOOD PRESSURE: 121 MMHG | HEART RATE: 81 BPM | RESPIRATION RATE: 18 BRPM

## 2022-09-15 LAB
ANION GAP SERPL CALC-SCNC: 10 MMOL/L — SIGNIFICANT CHANGE UP (ref 7–14)
BUN SERPL-MCNC: 67 MG/DL — CRITICAL HIGH (ref 10–20)
CALCIUM SERPL-MCNC: 8.3 MG/DL — LOW (ref 8.4–10.5)
CHLORIDE SERPL-SCNC: 99 MMOL/L — SIGNIFICANT CHANGE UP (ref 98–110)
CO2 SERPL-SCNC: 24 MMOL/L — SIGNIFICANT CHANGE UP (ref 17–32)
CREAT SERPL-MCNC: 3 MG/DL — HIGH (ref 0.7–1.5)
EGFR: 15 ML/MIN/1.73M2 — LOW
GLUCOSE SERPL-MCNC: 133 MG/DL — HIGH (ref 70–99)
MAGNESIUM SERPL-MCNC: 2.3 MG/DL — SIGNIFICANT CHANGE UP (ref 1.8–2.4)
MAGNESIUM SERPL-MCNC: 2.3 MG/DL — SIGNIFICANT CHANGE UP (ref 1.8–2.4)
PHOSPHATE SERPL-MCNC: 4 MG/DL — SIGNIFICANT CHANGE UP (ref 2.1–4.9)
POTASSIUM SERPL-MCNC: 4.7 MMOL/L — SIGNIFICANT CHANGE UP (ref 3.5–5)
POTASSIUM SERPL-SCNC: 4.7 MMOL/L — SIGNIFICANT CHANGE UP (ref 3.5–5)
SODIUM SERPL-SCNC: 133 MMOL/L — LOW (ref 135–146)

## 2022-09-15 PROCEDURE — 99239 HOSP IP/OBS DSCHRG MGMT >30: CPT

## 2022-09-15 RX ORDER — AMLODIPINE BESYLATE 2.5 MG/1
1 TABLET ORAL
Qty: 0 | Refills: 0 | DISCHARGE

## 2022-09-15 RX ORDER — ALBUTEROL 90 UG/1
2 AEROSOL, METERED ORAL
Qty: 60 | Refills: 0
Start: 2022-09-15 | End: 2022-10-14

## 2022-09-15 RX ORDER — NIFEDIPINE 30 MG
1 TABLET, EXTENDED RELEASE 24 HR ORAL
Qty: 30 | Refills: 0
Start: 2022-09-15 | End: 2022-10-14

## 2022-09-15 RX ORDER — LATANOPROST 0.05 MG/ML
1 SOLUTION/ DROPS OPHTHALMIC; TOPICAL
Qty: 0 | Refills: 0 | DISCHARGE

## 2022-09-15 RX ORDER — FUROSEMIDE 40 MG
1 TABLET ORAL
Qty: 0 | Refills: 0 | DISCHARGE

## 2022-09-15 RX ORDER — HYDRALAZINE HCL 50 MG
1 TABLET ORAL
Qty: 0 | Refills: 0 | DISCHARGE

## 2022-09-15 RX ORDER — HYDRALAZINE HCL 50 MG
1 TABLET ORAL
Qty: 90 | Refills: 0
Start: 2022-09-15 | End: 2022-10-14

## 2022-09-15 RX ADMIN — Medication 20 MILLIGRAM(S): at 05:37

## 2022-09-15 RX ADMIN — LIDOCAINE 1 PATCH: 4 CREAM TOPICAL at 09:08

## 2022-09-15 RX ADMIN — Medication 3 MILLILITER(S): at 09:39

## 2022-09-15 RX ADMIN — IRON SUCROSE 110 MILLIGRAM(S): 20 INJECTION, SOLUTION INTRAVENOUS at 17:19

## 2022-09-15 RX ADMIN — Medication 10 MILLIGRAM(S): at 05:37

## 2022-09-15 RX ADMIN — Medication 667 MILLIGRAM(S): at 09:12

## 2022-09-15 RX ADMIN — Medication 60 MILLIGRAM(S): at 05:37

## 2022-09-15 RX ADMIN — APIXABAN 2.5 MILLIGRAM(S): 2.5 TABLET, FILM COATED ORAL at 05:37

## 2022-09-15 RX ADMIN — ISOSORBIDE MONONITRATE 60 MILLIGRAM(S): 60 TABLET, EXTENDED RELEASE ORAL at 12:49

## 2022-09-15 RX ADMIN — Medication 1 MILLIGRAM(S): at 12:49

## 2022-09-15 RX ADMIN — LATANOPROST 1 DROP(S): 0.05 SOLUTION/ DROPS OPHTHALMIC; TOPICAL at 05:35

## 2022-09-15 RX ADMIN — Medication 667 MILLIGRAM(S): at 12:49

## 2022-09-15 RX ADMIN — Medication 667 MILLIGRAM(S): at 17:19

## 2022-09-15 RX ADMIN — APIXABAN 2.5 MILLIGRAM(S): 2.5 TABLET, FILM COATED ORAL at 17:20

## 2022-09-15 RX ADMIN — LATANOPROST 1 DROP(S): 0.05 SOLUTION/ DROPS OPHTHALMIC; TOPICAL at 17:22

## 2022-09-15 RX ADMIN — LIDOCAINE 1 PATCH: 4 CREAM TOPICAL at 12:48

## 2022-09-15 RX ADMIN — Medication 10 MILLIGRAM(S): at 14:12

## 2022-09-15 NOTE — PROGRESS NOTE ADULT - SUBJECTIVE AND OBJECTIVE BOX
RACHEL SUMMERS  87y  Female      Patient is a 87y old  Female who presents with a chief complaint of CHF exacerbation (14 Sep 2022 11:35)      INTERVAL HPI/OVERNIGHT EVENTS:  pt seen this am   -no acute events notified to me   -ok with d/c planning home with home care today   -patient will need to follow up with cardiology in the community     REVIEW OF SYSTEMS:  -no new complaints     Vital Signs Last 24 Hrs  T(C): 36.2 (15 Sep 2022 04:48), Max: 36.9 (14 Sep 2022 14:35)  T(F): 97.2 (15 Sep 2022 04:48), Max: 98.5 (14 Sep 2022 14:35)  HR: 74 (15 Sep 2022 04:48) (74 - 80)  BP: 129/62 (15 Sep 2022 04:48) (102/57 - 129/62)  BP(mean): --  RR: 18 (15 Sep 2022 04:48) (18 - 18)  SpO2: 97% (15 Sep 2022 10:19) (97% - 97%)    Parameters below as of 15 Sep 2022 10:19  Patient On (Oxygen Delivery Method): room air      PHYSICAL EXAM:  GENERAL: NAD, comfortable in bed   HEAD:  Atraumatic, Normocephalic  EYES: EOMI, PERRLA, conjunctiva and sclera clear  NERVOUS SYSTEM:  Alert & Oriented X 3  CHEST/LUNG: Clear b/l  CV/HEART: Regular rate and rhythm; No murmurs, rubs, or gallops  GI/ABDOMEN: Soft abdomen   EXTREMITIES:  2+ Peripheral Pulses, No clubbing, cyanosis, or edema  SKIN: No rashes or lesions    LAB:               8.5    6.61  )-----------( 185      ( 14 Sep 2022 07:31 )             25.7     09-14    137  |  103  |  63<HH>  ----------------------------<  85  4.8   |  24  |  3.0<H>    Ca    8.3<L>      14 Sep 2022 07:31  Phos  3.8     09-14  Mg     1.7     09-14    TPro  5.5<L>  /  Alb  3.0<L>  /  TBili  0.2  /  DBili  x   /  AST  18  /  ALT  8   /  AlkPhos  100  09-14    CARDIAC MARKERS ( 13 Sep 2022 01:40 )  x     / 0.03 ng/mL / x     / x     / x      CARDIAC MARKERS ( 12 Sep 2022 17:47 )  x     / 0.03 ng/mL / x     / x     / x        Daily Height in cm: 160 (14 Sep 2022 06:44)    Daily   CAPILLARY BLOOD GLUCOSE    LIVER FUNCTIONS - ( 14 Sep 2022 07:31 )  Alb: 3.0 g/dL / Pro: 5.5 g/dL / ALK PHOS: 100 U/L / ALT: 8 U/L / AST: 18 U/L / GGT: x           RADIOLOGY:    Imaging Personally visualized and Reviewed:  [ y ] YES  [ ] NO    HEALTH ISSUES - PROBLEM Dx:    MEDICATIONS  (STANDING):  ALBUTerol    90 MICROgram(s) HFA Inhaler 1 Puff(s) Inhalation once  albuterol/ipratropium for Nebulization 3 milliLiter(s) Nebulizer every 6 hours  apixaban 2.5 milliGRAM(s) Oral every 12 hours  atorvastatin 20 milliGRAM(s) Oral at bedtime  calcium acetate 667 milliGRAM(s) Oral three times a day with meals  folic acid 1 milliGRAM(s) Oral daily  hydrALAZINE 10 milliGRAM(s) Oral three times a day  iron sucrose IVPB 200 milliGRAM(s) IV Intermittent every 24 hours  isosorbide   mononitrate ER Tablet (IMDUR) 60 milliGRAM(s) Oral daily  latanoprost 0.005% Ophthalmic Solution 1 Drop(s) Both EYES <User Schedule>  lidocaine   4% Patch 1 Patch Transdermal daily  NIFEdipine XL 60 milliGRAM(s) Oral daily  torsemide 20 milliGRAM(s) Oral daily    MEDICATIONS  (PRN):  acetaminophen     Tablet .. 650 milliGRAM(s) Oral every 6 hours PRN Mild Pain (1 - 3), Moderate Pain (4 - 6)  ALBUTerol    0.083%. 2.5 milliGRAM(s) Nebulizer once PRN Shortness of Breath and/or Wheezing  benzocaine 20% Spray 1 Spray(s) Topical three times a day PRN sore throat

## 2022-09-15 NOTE — DISCHARGE NOTE PROVIDER - HOSPITAL COURSE
Patient is an 86 year old female patient (Episcopal) with pMHX of HFrEF (Echo 3/2022: EF 25-30%) s/p AICD, pulm HTN, provoked PE/DVT on Eliquis, DLD, CKD 4, RA and glaucoma whom presented to ED with SOB and worsening LE edema of 1 day duration. Patient endorsed orthopnea. Patient also reported worsening pain in her bilateral shoulders and knees. She denied fever, chills, headache, upper respiratory symptoms, abdominal or urinary symptoms. No chest pain, no palpitations. No sick contacts.    In ED, vitals wnl  Laboratory workup significant for Hb 8.1 (at baseline), K 5.2, Cr 2.5, eGFR 18 (at baseline), Troponin 0.02, BNP 07182  Chest X-ray negative for acute cardiopulmonary pathology  She received 1 dose of IV Lasix 40 mg in ED    Patient was scheduled for placement of AICD lead but procedure was cancelled due to patient being high risk. Patient was switched from Lasix to Torsemide. Amlodipine was switched to Nifedipine. Patient has been hemodynamically stable since admission with resolution of symptoms. IV iron was given for iron deficiency. Patient will be discharged with reduced dosage of hydralazine to 10mg.   Patient is an 86 year old female patient (Alevism) with pMHX of HFrEF (Echo 3/2022: EF 25-30%) s/p AICD, pulm HTN, provoked PE/DVT on Eliquis, DLD, CKD 4, RA and glaucoma whom presented to ED with SOB and worsening LE edema of 1 day duration. Patient endorsed orthopnea. Patient also reported worsening pain in her bilateral shoulders and knees. She denied fever, chills, headache, upper respiratory symptoms, abdominal or urinary symptoms. No chest pain, no palpitations. No sick contacts.    In ED, vitals wnl  Laboratory workup significant for Hb 8.1 (at baseline), K 5.2, Cr 2.5, eGFR 18 (at baseline), Troponin 0.02, BNP 88754  Chest X-ray negative for acute cardiopulmonary pathology  She received 1 dose of IV Lasix 40 mg in ED    Patient was scheduled for placement of AICD lead but procedure was cancelled due to patient being high risk. Patient was switched from Lasix to Torsemide. Amlodipine was switched to Nifedipine. Patient has been hemodynamically stable since admission with resolution of symptoms. IV iron was given for iron deficiency. Patient will be discharged with reduced dosage of hydralazine to 10mg.    -seen and examined this morning and stable for d/c home today   -spent over 35 mins d/c planning; direct patient care and chart review

## 2022-09-15 NOTE — PROGRESS NOTE ADULT - ASSESSMENT
87 year old patient (Christian), known to have HFrEF (Echo 3/2022: EF 25-30%) s/p AICD, HTN, provoked PE/DVT on Eliquis, DLD, CKD 4 and glaucoma, presented to ED with SOB of duration. Admitted for further management of HF exacerbation.     ·	Acute on chronic CHFrEF due to uncontrolled hypertensioncontinue torsemide  ·	Debility - B/L shoulder and B/L knee pain   ·	CKD stage IV  ·	Anemia of chronic illness   ·	HTN  ·	Chronic PE / DVT     -CT sx following - ICD lead placement procedure cancelled due to high risk of morbidity and complications post op   -diet and AC resumed - stable cbc   -continue with Torsemide - monitor daily weights and I and Os -( Echo 3/2022: EF 25-30%; echo 9/12: EF 40-45%; severe LVH, gr 1 D dysfunction)  -c/w Statin and Imdur   -No ACE inhibitors/ ARB/ Entresto/ spironolactone since patient can become severely hyperkalemic as per cardiology   -Previous shoulder xray shows degenerative changes of the glenohumeral and acromioclavicular joints noted.  -Previous labs are significant for elevated uric acid and inflammatory markers, negative cyclic citrullinated peptide   -Patient was evaluated by rheumatology in the past  -c/w with pain control PRN, warm compress  -B/L knee X-rays :No acute fracture or acute articular abnormality. Severe tricompartmental   DJD, more prominent since the prior exam - PT/OT as tolerated   -s/p EGD and colonoscopy in 2020 with non-specific gastritis and Diverticulosis - recommendation was to repeat colonoscopy in 1 year, which she did not   -NO BLOOD TRANSFUSIONS AS PER PT WISHES, Mormonism  -ok with Iron Infusion - outpatient iron studies   -continue with rest of maintenance meds     DISPO: home with HHA today   -staff updated family     Attending Physician Dr. Judy Saldaña # [id number]

## 2022-09-15 NOTE — DISCHARGE NOTE PROVIDER - ATTENDING DISCHARGE PHYSICAL EXAMINATION:
Vital Signs Last 24 Hrs  T(C): 36.2 (15 Sep 2022 04:48), Max: 36.9 (14 Sep 2022 14:35)  T(F): 97.2 (15 Sep 2022 04:48), Max: 98.5 (14 Sep 2022 14:35)  HR: 74 (15 Sep 2022 04:48) (74 - 80)  BP: 129/62 (15 Sep 2022 04:48) (102/57 - 129/62)  BP(mean): --  RR: 18 (15 Sep 2022 04:48) (18 - 18)  SpO2: 97% (15 Sep 2022 10:19) (97% - 97%)    Parameters below as of 15 Sep 2022 10:19  Patient On (Oxygen Delivery Method): room air    PHYSICAL EXAM:  GENERAL: NAD, sitting up in bed   HEAD:  Atraumatic, Normocephalic  EYES: EOMI, PERRLA, conjunctiva and sclera clear  NERVOUS SYSTEM:  Alert & Oriented X 3  CHEST/LUNG: Clear b/l  HEART: Regular rate and rhythm; No murmurs, rubs, or gallops  ABDOMEN: Soft abdomen  EXTREMITIES:  2+ Peripheral Pulses, No clubbing, cyanosis, or edema  SKIN: No rashes or lesions

## 2022-09-15 NOTE — PROGRESS NOTE ADULT - TIME BILLING
as above
direct patient care and chart review  -coordinated current plan of care with medical staff on MDR
direct patient care and chart review  -coordinated current plan of care with medical staff on MDR  -d/c papers edited by me
as above

## 2022-09-15 NOTE — DISCHARGE NOTE PROVIDER - CARE PROVIDER_API CALL
Marian Barillas)  Cardiovascular Disease; Internal Medicine  [address] [place] [address], [address]  [place] [address]  Phone: [phone]  Fax: [phone]  Follow Up Time:     Francis Blakely)  Surgery; Thoracic and Cardiac Surgery  [address] [place] [address], [address]  [place] [address]  Phone: [phone]  Fax: [phone]  Follow Up Time:

## 2022-09-15 NOTE — DISCHARGE NOTE PROVIDER - NSDCFUSCHEDAPPT_GEN_ALL_CORE_FT
Francis Blakely  [facility]  CTSURG [address] [facility]  Scheduled Appointment: 09/21/2022

## 2022-09-15 NOTE — DISCHARGE NOTE PROVIDER - NSDCCPCAREPLAN_GEN_ALL_CORE_FT
PRINCIPAL DISCHARGE DIAGNOSIS  Diagnosis: CHF exacerbation  Assessment and Plan of Treatment: Congestive heart failure (CHF) is a chronic condition in which the heart has trouble pumping blood. In some cases of heart failure, fluid may back up into your lungs or you may have swelling (edema) in your lower legs. Symptoms include shortness of breath with activity or when lying flat, cough, swelling of the legs, fatigue, or increased urination during the night.   You were treated with medications to manage symptoms of your heart failure. Take medicines only as directed by your health care provider and do not stop unless instructed to do so. Eat heart-healthy foods with low or no trans/saturated fats, cholesterol and salt.   SEEK IMMEDIATE MEDICAL CARE IF YOU HAVE ANY OF THE FOLLOWING SYMPTOMS: shortness of breath, change in mental status, chest pain, lightheadedness/dizziness/fainting, or worsening of symptoms including not being able to conduct normal physical activity.        SECONDARY DISCHARGE DIAGNOSES  Diagnosis: SOB (shortness of breath)  Assessment and Plan of Treatment: Shortness of breath  Shortness of breath (dyspnea) means you have trouble breathing and could indicate a medical problem. Causes include lung disease, heart disease, low amount of red blood cells (anemia), poor physical fitness, being overweight, smoking, etc.   SEEK IMMEDIATE MEDICAL CARE IF YOU HAVE ANY OF THE FOLLOWING SYMPTOMS: worsening shortness of breath, chest pain, back pain, abdominal pain, fever, coughing up blood, lightheadedness/dizziness.    Diagnosis: Leg swelling  Assessment and Plan of Treatment: Congestive heart failure (CHF) is a chronic condition in which the heart has trouble pumping blood. In some cases of heart failure, you may have swelling (edema) in your lower legs. You were given medications to help reduce the swelling.     PRINCIPAL DISCHARGE DIAGNOSIS  Diagnosis: CHF exacerbation  Assessment and Plan of Treatment: Congestive heart failure (CHF) is a chronic condition in which the heart has trouble pumping blood. In some cases of heart failure, fluid may back up into your lungs or you may have swelling (edema) in your lower legs. Symptoms include shortness of breath with activity or when lying flat, cough, swelling of the legs, fatigue, or increased urination during the night.   You were treated with medications to manage symptoms of your heart failure. Take medicines only as directed by your health care provider and do not stop unless instructed to do so. Eat heart-healthy foods with low or no trans/saturated fats, cholesterol and salt.   SEEK IMMEDIATE MEDICAL CARE IF YOU HAVE ANY OF THE FOLLOWING SYMPTOMS: shortness of breath, change in mental status, chest pain, lightheadedness/dizziness/fainting, or worsening of symptoms including not being able to conduct normal physical activity.  From now on you will be taking torsemide 20mg daily. Please follow up with cardiology         SECONDARY DISCHARGE DIAGNOSES  Diagnosis: SOB (shortness of breath)  Assessment and Plan of Treatment: Shortness of breath  Shortness of breath (dyspnea) means you have trouble breathing and could indicate a medical problem. Causes include lung disease, heart disease, low amount of red blood cells (anemia), poor physical fitness, being overweight, smoking, etc.   SEEK IMMEDIATE MEDICAL CARE IF YOU HAVE ANY OF THE FOLLOWING SYMPTOMS: worsening shortness of breath, chest pain, back pain, abdominal pain, fever, coughing up blood, lightheadedness/dizziness.    Diagnosis: Leg swelling  Assessment and Plan of Treatment: Congestive heart failure (CHF) is a chronic condition in which the heart has trouble pumping blood. In some cases of heart failure, you may have swelling (edema) in your lower legs. You were given medications to help reduce the swelling.    Diagnosis: History of pacemaker  Assessment and Plan of Treatment: there is a lead that needs to be inserpet from the pacemaker. CT surgerons were supposed to do the surgery but given your high risk of complications in surgery no intervention was done

## 2022-09-15 NOTE — DISCHARGE NOTE PROVIDER - NSDCMRMEDTOKEN_GEN_ALL_CORE_FT
apixaban 2.5 mg oral tablet: 1 tab(s) orally 2 times a day  calcium (as carbonate) 600 mg oral tablet: 1 tab(s) orally 3 times a day  calcium acetate 667 mg oral capsule: 2 cap(s) orally 3 times a day (with meals)  folic acid 1 mg oral tablet: 1 tab(s) orally once a day  hydrALAZINE 10 mg oral tablet: 1 tab(s) orally 3 times a day  isosorbide mononitrate 60 mg oral tablet, extended release: 1 tab(s) orally once a day (in the morning)  Lipitor 20 mg oral tablet: 1 tab(s) orally once a day (at bedtime)  NIFEdipine 60 mg oral tablet, extended release: 1 tab(s) orally once a day  torsemide 20 mg oral tablet: 1 tab(s) orally once a day  Ventolin HFA 90 mcg/inh inhalation aerosol: 2 puff(s) inhaled once a day as needed for shortness of breath

## 2022-09-15 NOTE — DISCHARGE NOTE PROVIDER - CARE PROVIDERS DIRECT ADDRESSES
[email or web address]

## 2022-09-15 NOTE — PROGRESS NOTE ADULT - PROVIDER SPECIALTY LIST ADULT
Hospitalist
Internal Medicine
CT Surgery
Hospitalist
Hospitalist
Internal Medicine
Cardiology
Internal Medicine
Hospitalist

## 2022-09-16 ENCOUNTER — APPOINTMENT (OUTPATIENT)
Dept: CARE COORDINATION | Facility: HOME HEALTH | Age: 87
End: 2022-09-16

## 2022-09-16 PROCEDURE — 99348 HOME/RES VST EST LOW MDM 30: CPT | Mod: 95

## 2022-09-16 RX ORDER — HYDRALAZINE HYDROCHLORIDE 25 MG/1
25 TABLET ORAL 3 TIMES DAILY
Qty: 270 | Refills: 0 | Status: DISCONTINUED | COMMUNITY
Start: 2022-08-25 | End: 2022-09-16

## 2022-09-16 RX ORDER — FUROSEMIDE 40 MG/1
40 TABLET ORAL
Refills: 0 | Status: DISCONTINUED | COMMUNITY
Start: 2019-05-06 | End: 2022-09-16

## 2022-09-16 NOTE — HISTORY OF PRESENT ILLNESS
[Home] : at home, [unfilled] , at the time of the visit. [Other Location: e.g. Home (Enter Location, City,State)___] : at [unfilled] [Family Member] : family member [Verbal consent obtained from patient] : the patient, [unfilled] [FreeTextEntry1] : follow up hospital discharge CHF  [de-identified] : Patient is a 87 year old f (Islam) enrolled in the [employer] program recently discharged from [facility] for CHF exacerbation , she has a PMH HFrEF ( EF 25-30%) s/p AICD, HTN, provoked PE/DVT on Eliquis, DLD, CKD 4 . Patient was diuresed and dc home  yesterday with HCS. Patient observed via tele health , Alert IN NAD denies c/p, sob, cough  wt gain (120) and LE swelling .\par \par \par Hospital Course copied from SCM: Patient is an 86 year old female patient (Islam) with pMHX of HFrEF (Echo 3/2022: EF 25-30%) s/p AICD, pulm HTN, provoked PE/DVT on Eliquis, DLD, CKD 4, RA and glaucoma whom presented to ED with SOB and worsening LE edema of 1 day duration. Patient endorsed orthopnea. Patient also reported worsening pain in her bilateral shoulders and knees. She denied fever, chills, headache, upper respiratory symptoms, abdominal or urinary symptoms. No chest pain, no palpitations. No sick contacts.\par In ED, vitals wnl\par Laboratory workup significant for Hb 8.1 (at baseline), K 5.2, Cr 2.5, eGFR 18 (at baseline), Troponin 0.02, BNP 14930\par Chest X-ray negative for acute cardiopulmonary pathology\par She received 1 dose of IV Lasix 40 mg in ED\par Patient was scheduled for placement of AICD lead but procedure was cancelled due to patient being high risk. Patient was switched from Lasix to Torsemide. Amlodipine was switched to Nifedipine. Patient has been hemodynamically stable since admission with resolution of symptoms. IV iron was given for iron deficiency. Patient will be discharged with reduced dosage of hydralazine to 10mg.\par -seen and examined this morning and stable for d/c home today \par -spent over 35 mins d/c planning; direct patient care and chart review

## 2022-09-16 NOTE — COUNSELING
[de-identified] : Pt was informed about CN’s role/ STARS program and overview of transitional care reviewed with patient. In addition, yellow contact card was provided. Pt educated on topics of importance such as compliance with all provider visits, prescribed medication regimen, and low salt diet. Pt encouraged calling CN with any issues, concerns or questions, also educated to notify CN if experiencing CP, SOB , cough, increased mucus/phlegm production, abdominal discomfort/swelling, difficulty sleeping or lying flat, fever, chills, fatigue, weight gain of 2-3lbs in 24 hours or 5lbs in one week,  dizziness, lightheadedness, n/v/d/c, swelling to extremities and/or any signs of CHF exacerbation as reviewed. Reassurance provided. Will continue to monitor\par \par

## 2022-09-16 NOTE — REVIEW OF SYSTEMS
LM for pt.  Pt to return call, will need to be updated on information provided below.      Will need to go over patient's lab results from 7/23/2021.      [Negative] : Cardiovascular

## 2022-09-16 NOTE — PLAN
[FreeTextEntry1] : CHF-  low salt diet , daily weights, yellow zones reviewed , c/w diuretic  not candidate for ACEi or ARB due to hyperkalemia in the past , cardio followup \par HTN- c/w current meds\par pcp/ cardio followup. \par TCM team will coordinate follow up \par

## 2022-09-21 ENCOUNTER — APPOINTMENT (OUTPATIENT)
Dept: CARDIOTHORACIC SURGERY | Facility: CLINIC | Age: 87
End: 2022-09-21

## 2022-09-21 ENCOUNTER — APPOINTMENT (OUTPATIENT)
Age: 87
End: 2022-09-21

## 2022-09-21 VITALS
HEART RATE: 87 BPM | TEMPERATURE: 98 F | HEIGHT: 63 IN | WEIGHT: 125 LBS | RESPIRATION RATE: 16 BRPM | SYSTOLIC BLOOD PRESSURE: 158 MMHG | DIASTOLIC BLOOD PRESSURE: 76 MMHG | BODY MASS INDEX: 22.15 KG/M2 | OXYGEN SATURATION: 98 %

## 2022-09-21 PROCEDURE — 99214 OFFICE O/P EST MOD 30 MIN: CPT

## 2022-09-21 NOTE — ASSESSMENT
[FreeTextEntry1] : Patient is an 86 year old female patient (Orthodoxy) with PMHX of HFrEF (Echo 3/2022: EF 25-30%) s/p AICD, Pulm HTN, provoked PE/DVT on Eliquis, DLD, CKD 4, RA and glaucoma.\par Patient arrives today for discussion for possible lead implantation.\par \par Patient is high risk for procedure. She is a [employer]'s service, and refuses blood product. Her pulmonary HTN is severe, risk of prolonged ventilation is high. Patient refuses long term ventilation. Overall high risk for surgery. Educated on these risks.\par I Jerad Zazueta, FNP-BC am acting as the scribe for Dr. Blakely\par \par \par CTS Attending\par pt interviewed and examined\par pt and daughter interviewed simultaneously (dtr by phone)\par case discussed with Dr. Mckinney, and previously, Dr. Baez (EP)\par pt admitted with signs and symptoms of CHF\par her CDK is worsening\par She is anemic and a [employer]hova[employer] Witness\par The patient has a long-standing BiV-ICD with an epicardial lead placed by\par thoracotomy,\par predating 2008 (first CxR in patient's PACS folder shows device already in\par place)\par pt is debilitated and frail\par The magnitude of the surgery is excessive for the patient.\par To replace the existing LV lead, it would take a re-op left thoracotomy (bloody\par procedure), and she would not accept a blood transfusion\par General anesthesia would be required, and pt could fail extubation\par I my assessment, the benefit of the surgery is overwhelmed by the risk of\par morbidity, complications and loss of independence\par especially with the limitations imposed by the restriction on blood transfusion\par I advise against surgical intervention\par Please have a meaningful discussion about goals of care\par 60-min consult/advise/PMD-Hank/Nestor/EP/imaging/chart/labs review\par -FMR\par \par I personally performed the services described in the documentation, reviewed the documentation recorded by the scribe in my presence and it accurately and completely records my words and actions.\par -FMR\par

## 2022-09-21 NOTE — HISTORY OF PRESENT ILLNESS
[FreeTextEntry1] : Patient is an 86 year old female patient (Oriental orthodox) with pMHX of HFrEF (Echo 3/2022: EF 25-30%) s/p AICD, pulm HTN, provoked PE/DVT on Eliquis, DLD, CKD 4, RA and glaucoma whom presented to ED with SOB and worsening LE edema of 1 day duration. Patient endorsed orthopnea. Patient also reported worsening pain in her bilateral shoulders and knees. She denied fever, chills, headache, upper respiratory symptoms, abdominal or urinary symptoms. No chest pain, no palpitations. No sick contacts. She arrives today to discuss possibility of adjusting lead. \par

## 2022-09-26 DIAGNOSIS — I50.23 ACUTE ON CHRONIC SYSTOLIC (CONGESTIVE) HEART FAILURE: ICD-10-CM

## 2022-09-26 DIAGNOSIS — M25.561 PAIN IN RIGHT KNEE: ICD-10-CM

## 2022-09-26 DIAGNOSIS — Z79.01 LONG TERM (CURRENT) USE OF ANTICOAGULANTS: ICD-10-CM

## 2022-09-26 DIAGNOSIS — Z88.1 ALLERGY STATUS TO OTHER ANTIBIOTIC AGENTS STATUS: ICD-10-CM

## 2022-09-26 DIAGNOSIS — E78.5 HYPERLIPIDEMIA, UNSPECIFIED: ICD-10-CM

## 2022-09-26 DIAGNOSIS — Z79.82 LONG TERM (CURRENT) USE OF ASPIRIN: ICD-10-CM

## 2022-09-26 DIAGNOSIS — M06.9 RHEUMATOID ARTHRITIS, UNSPECIFIED: ICD-10-CM

## 2022-09-26 DIAGNOSIS — I27.20 PULMONARY HYPERTENSION, UNSPECIFIED: ICD-10-CM

## 2022-09-26 DIAGNOSIS — H40.9 UNSPECIFIED GLAUCOMA: ICD-10-CM

## 2022-09-26 DIAGNOSIS — I13.0 HYPERTENSIVE HEART AND CHRONIC KIDNEY DISEASE WITH HEART FAILURE AND STAGE 1 THROUGH STAGE 4 CHRONIC KIDNEY DISEASE, OR UNSPECIFIED CHRONIC KIDNEY DISEASE: ICD-10-CM

## 2022-09-26 DIAGNOSIS — Z86.718 PERSONAL HISTORY OF OTHER VENOUS THROMBOSIS AND EMBOLISM: ICD-10-CM

## 2022-09-26 DIAGNOSIS — Z86.711 PERSONAL HISTORY OF PULMONARY EMBOLISM: ICD-10-CM

## 2022-09-26 DIAGNOSIS — D63.8 ANEMIA IN OTHER CHRONIC DISEASES CLASSIFIED ELSEWHERE: ICD-10-CM

## 2022-09-26 DIAGNOSIS — E87.5 HYPERKALEMIA: ICD-10-CM

## 2022-09-26 DIAGNOSIS — I16.1 HYPERTENSIVE EMERGENCY: ICD-10-CM

## 2022-09-26 DIAGNOSIS — M25.511 PAIN IN RIGHT SHOULDER: ICD-10-CM

## 2022-09-26 DIAGNOSIS — M25.512 PAIN IN LEFT SHOULDER: ICD-10-CM

## 2022-09-26 DIAGNOSIS — I50.9 HEART FAILURE, UNSPECIFIED: ICD-10-CM

## 2022-09-26 DIAGNOSIS — M25.562 PAIN IN LEFT KNEE: ICD-10-CM

## 2022-09-26 DIAGNOSIS — N18.4 CHRONIC KIDNEY DISEASE, STAGE 4 (SEVERE): ICD-10-CM

## 2022-09-26 DIAGNOSIS — Z95.0 PRESENCE OF CARDIAC PACEMAKER: ICD-10-CM

## 2022-09-29 ENCOUNTER — APPOINTMENT (OUTPATIENT)
Dept: CARDIOLOGY | Facility: CLINIC | Age: 87
End: 2022-09-29

## 2022-09-29 VITALS — SYSTOLIC BLOOD PRESSURE: 128 MMHG | DIASTOLIC BLOOD PRESSURE: 60 MMHG | HEART RATE: 70 BPM

## 2022-09-29 VITALS — HEIGHT: 63 IN | WEIGHT: 120 LBS | BODY MASS INDEX: 21.26 KG/M2

## 2022-09-29 DIAGNOSIS — Z00.00 ENCOUNTER FOR GENERAL ADULT MEDICAL EXAMINATION W/OUT ABNORMAL FINDINGS: ICD-10-CM

## 2022-09-29 PROCEDURE — 93000 ELECTROCARDIOGRAM COMPLETE: CPT

## 2022-09-29 PROCEDURE — 99214 OFFICE O/P EST MOD 30 MIN: CPT

## 2022-09-29 RX ORDER — HYDRALAZINE HYDROCHLORIDE 10 MG/1
10 TABLET ORAL
Refills: 0 | Status: DISCONTINUED | COMMUNITY
End: 2022-09-29

## 2022-09-29 RX ORDER — ATORVASTATIN CALCIUM 80 MG/1
TABLET, FILM COATED ORAL
Refills: 0 | Status: DISCONTINUED | COMMUNITY
End: 2022-09-29

## 2022-09-29 RX ORDER — AMLODIPINE BESYLATE 5 MG/1
5 TABLET ORAL DAILY
Qty: 90 | Refills: 3 | Status: DISCONTINUED | COMMUNITY
Start: 2022-08-25 | End: 2022-09-29

## 2022-09-29 RX ORDER — LATANOPROST/PF 0.005 %
0.01 DROPS OPHTHALMIC (EYE)
Qty: 2 | Refills: 0 | Status: DISCONTINUED | COMMUNITY
Start: 2018-06-19 | End: 2022-09-29

## 2022-09-29 RX ORDER — ALBUTEROL SULFATE 90 UG/1
108 (90 BASE) AEROSOL, METERED RESPIRATORY (INHALATION)
Refills: 0 | Status: ACTIVE | COMMUNITY

## 2022-09-29 NOTE — CARDIOLOGY SUMMARY
[___] : [unfilled] [LVEF ___%] : LVEF [unfilled]% [de-identified] : 12- Atrial paced rhyth IVCD LBBB morphology \par 3- NSR LBBB \par 8- Atrial paced  LBBB \par 09/29/2022: SR, LBBB vs ventricular pacing .  [de-identified] : 3-7-2022 EF 25-30% moderate MR Mod TR \par 9- EF 40-45% Severe concnetric LVH

## 2022-09-29 NOTE — ASSESSMENT
[FreeTextEntry1] : The patient has had a nonischemic CM . She has had multiple caths in the past . The patient has had a DVT and has had a PE in the remote past . The patient has been on eliquis . The patient has anemia [facility] and internal hemorrhoids . The patient has has CKD  which is contributing to the anemia . She is followed by hematology for her anemia which can be problematic in view of her Jehovah Witness Jane . She is getting Epogen and iron infusions and is under the care of hematology . The left sided lead was an epicardial lead which had been disconnected because of high thresholds and battery drain . He diuretics were changed to Torsemide with improvement . Her weight is down and she has no edema on exam today .

## 2022-09-29 NOTE — HISTORY OF PRESENT ILLNESS
[FreeTextEntry1] : 87 y.o. female, Faith, HFrEF, AICD, PE/DVT on Eliquis, CKD presents for cardiology F/U visit. Heather had recent admission to [facility] for CHF exacerbation, [place] [id number], she was diuresed with IV loop diuretics and discharged home with adjustments in medical management: amlodipine was changed to nifedipine and furosemide to torsemide. Patient has LE edema, underwent venous US during her hospital stay - neg for DVT. TTE from the hospital showed LVEF 40-45% and global LV hypokinesis. She received IV Iron in the hospital for BREANNA. She has vbeen getting iron infusions and epogen from Dr. Hernadez of hematology . She was seen by CTS for LV lead replacement but was found to be poor candidate for surgical procedure. She had an epicardial left sided lead which had been disconnected ( High thesholds and battery drain)

## 2022-09-29 NOTE — REVIEW OF SYSTEMS
[SOB] : shortness of breath [Dyspnea on exertion] : dyspnea during exertion [Cough] : cough [Joint Pain] : joint pain [Negative] : Psychiatric [Chest Discomfort] : no chest discomfort [Palpitations] : no palpitations

## 2022-10-06 NOTE — ED ADULT TRIAGE NOTE - BP NONINVASIVE DIASTOLIC (MM HG)
62 Detail Level: Detailed Medical Necessity Information: It is in your best interest to select a reason for this procedure from the list below. All of these items fulfill various CMS LCD requirements except the new and changing color options. Consent: The patient's consent was obtained including but not limited to risks of crusting, scabbing, blistering, scarring, darker or lighter pigmentary change, recurrence, incomplete removal and infection. Add 52 Modifier (Optional): no Show Applicator Variable?: Yes Spray Paint Text: The liquid nitrogen was applied to the skin utilizing a spray paint frosting technique. Post-Care Instructions: I reviewed with the patient in detail post-care instructions. Patient is to wear sunprotection, and avoid picking at any of the treated lesions. Pt may apply Vaseline to crusted or scabbing areas. Medical Necessity Clause: This procedure was medically necessary because the lesions that were treated were:

## 2022-11-15 NOTE — ED ADULT NURSE NOTE - BREATH SOUNDS, MLM
He acknowledges that he has not been compliant with his medical regimen.  He said that he has been taking his pills intermittently although he said he has been using his insulin.  I explained to him that his blood pressure and glucose are not under good control and neither is his cholesterol because of noncompliance and that it is important to go back on his regimen.   Clear

## 2022-11-21 ENCOUNTER — APPOINTMENT (OUTPATIENT)
Dept: CARDIOLOGY | Facility: CLINIC | Age: 87
End: 2022-11-21

## 2022-12-19 ENCOUNTER — INPATIENT (INPATIENT)
Facility: HOSPITAL | Age: 87
LOS: 9 days | Discharge: HOSPICE | End: 2022-12-29
Attending: INTERNAL MEDICINE | Admitting: INTERNAL MEDICINE
Payer: MEDICARE

## 2022-12-19 VITALS
DIASTOLIC BLOOD PRESSURE: 56 MMHG | OXYGEN SATURATION: 92 % | RESPIRATION RATE: 18 BRPM | TEMPERATURE: 98 F | HEART RATE: 80 BPM | SYSTOLIC BLOOD PRESSURE: 102 MMHG

## 2022-12-19 DIAGNOSIS — Z98.890 OTHER SPECIFIED POSTPROCEDURAL STATES: Chronic | ICD-10-CM

## 2022-12-19 DIAGNOSIS — Z90.49 ACQUIRED ABSENCE OF OTHER SPECIFIED PARTS OF DIGESTIVE TRACT: Chronic | ICD-10-CM

## 2022-12-19 DIAGNOSIS — Z90.710 ACQUIRED ABSENCE OF BOTH CERVIX AND UTERUS: Chronic | ICD-10-CM

## 2022-12-19 DIAGNOSIS — Z95.0 PRESENCE OF CARDIAC PACEMAKER: Chronic | ICD-10-CM

## 2022-12-19 DIAGNOSIS — Z90.89 ACQUIRED ABSENCE OF OTHER ORGANS: Chronic | ICD-10-CM

## 2022-12-19 LAB
ALBUMIN SERPL ELPH-MCNC: 3.6 G/DL — SIGNIFICANT CHANGE UP (ref 3.5–5.2)
ALP SERPL-CCNC: 116 U/L — HIGH (ref 30–115)
ALT FLD-CCNC: 16 U/L — SIGNIFICANT CHANGE UP (ref 0–41)
ANION GAP SERPL CALC-SCNC: 12 MMOL/L — SIGNIFICANT CHANGE UP (ref 7–14)
APPEARANCE UR: ABNORMAL
AST SERPL-CCNC: 32 U/L — SIGNIFICANT CHANGE UP (ref 0–41)
BACTERIA # UR AUTO: ABNORMAL
BASOPHILS # BLD AUTO: 0.06 K/UL — SIGNIFICANT CHANGE UP (ref 0–0.2)
BASOPHILS NFR BLD AUTO: 0.7 % — SIGNIFICANT CHANGE UP (ref 0–1)
BILIRUB SERPL-MCNC: 0.2 MG/DL — SIGNIFICANT CHANGE UP (ref 0.2–1.2)
BILIRUB UR-MCNC: NEGATIVE — SIGNIFICANT CHANGE UP
BUN SERPL-MCNC: 97 MG/DL — CRITICAL HIGH (ref 10–20)
CALCIUM SERPL-MCNC: 9.8 MG/DL — SIGNIFICANT CHANGE UP (ref 8.4–10.5)
CHLORIDE SERPL-SCNC: 99 MMOL/L — SIGNIFICANT CHANGE UP (ref 98–110)
CO2 SERPL-SCNC: 27 MMOL/L — SIGNIFICANT CHANGE UP (ref 17–32)
COLOR SPEC: YELLOW — SIGNIFICANT CHANGE UP
CREAT SERPL-MCNC: 4.7 MG/DL — CRITICAL HIGH (ref 0.7–1.5)
DIFF PNL FLD: ABNORMAL
EGFR: 9 ML/MIN/1.73M2 — LOW
EOSINOPHIL # BLD AUTO: 0.53 K/UL — SIGNIFICANT CHANGE UP (ref 0–0.7)
EOSINOPHIL NFR BLD AUTO: 6 % — SIGNIFICANT CHANGE UP (ref 0–8)
EPI CELLS # UR: 1 /HPF — SIGNIFICANT CHANGE UP (ref 0–5)
GLUCOSE SERPL-MCNC: 221 MG/DL — HIGH (ref 70–99)
GLUCOSE UR QL: NEGATIVE — SIGNIFICANT CHANGE UP
HCT VFR BLD CALC: 36.1 % — LOW (ref 37–47)
HGB BLD-MCNC: 11.3 G/DL — LOW (ref 12–16)
HYALINE CASTS # UR AUTO: 0 /LPF — SIGNIFICANT CHANGE UP (ref 0–7)
IMM GRANULOCYTES NFR BLD AUTO: 0.7 % — HIGH (ref 0.1–0.3)
KETONES UR-MCNC: SIGNIFICANT CHANGE UP
LACTATE SERPL-SCNC: 1.6 MMOL/L — SIGNIFICANT CHANGE UP (ref 0.7–2)
LEUKOCYTE ESTERASE UR-ACNC: ABNORMAL
LYMPHOCYTES # BLD AUTO: 2.05 K/UL — SIGNIFICANT CHANGE UP (ref 1.2–3.4)
LYMPHOCYTES # BLD AUTO: 23.1 % — SIGNIFICANT CHANGE UP (ref 20.5–51.1)
MCHC RBC-ENTMCNC: 28.1 PG — SIGNIFICANT CHANGE UP (ref 27–31)
MCHC RBC-ENTMCNC: 31.3 G/DL — LOW (ref 32–37)
MCV RBC AUTO: 89.8 FL — SIGNIFICANT CHANGE UP (ref 81–99)
MONOCYTES # BLD AUTO: 1.19 K/UL — HIGH (ref 0.1–0.6)
MONOCYTES NFR BLD AUTO: 13.4 % — HIGH (ref 1.7–9.3)
NEUTROPHILS # BLD AUTO: 5 K/UL — SIGNIFICANT CHANGE UP (ref 1.4–6.5)
NEUTROPHILS NFR BLD AUTO: 56.1 % — SIGNIFICANT CHANGE UP (ref 42.2–75.2)
NITRITE UR-MCNC: NEGATIVE — SIGNIFICANT CHANGE UP
NRBC # BLD: 0 /100 WBCS — SIGNIFICANT CHANGE UP (ref 0–0)
PH UR: 6.5 — SIGNIFICANT CHANGE UP (ref 5–8)
PLATELET # BLD AUTO: 262 K/UL — SIGNIFICANT CHANGE UP (ref 130–400)
POTASSIUM SERPL-MCNC: 5.5 MMOL/L — HIGH (ref 3.5–5)
POTASSIUM SERPL-SCNC: 5.5 MMOL/L — HIGH (ref 3.5–5)
PROT SERPL-MCNC: 6.5 G/DL — SIGNIFICANT CHANGE UP (ref 6–8)
PROT UR-MCNC: ABNORMAL
RBC # BLD: 4.02 M/UL — LOW (ref 4.2–5.4)
RBC # FLD: 14.6 % — HIGH (ref 11.5–14.5)
RBC CASTS # UR COMP ASSIST: 2 /HPF — SIGNIFICANT CHANGE UP (ref 0–4)
SARS-COV-2 RNA SPEC QL NAA+PROBE: SIGNIFICANT CHANGE UP
SODIUM SERPL-SCNC: 138 MMOL/L — SIGNIFICANT CHANGE UP (ref 135–146)
SP GR SPEC: 1.01 — SIGNIFICANT CHANGE UP (ref 1.01–1.03)
UROBILINOGEN FLD QL: SIGNIFICANT CHANGE UP
WBC # BLD: 8.89 K/UL — SIGNIFICANT CHANGE UP (ref 4.8–10.8)
WBC # FLD AUTO: 8.89 K/UL — SIGNIFICANT CHANGE UP (ref 4.8–10.8)
WBC UR QL: 398 /HPF — HIGH (ref 0–5)

## 2022-12-19 PROCEDURE — 99285 EMERGENCY DEPT VISIT HI MDM: CPT | Mod: FS

## 2022-12-19 PROCEDURE — 93010 ELECTROCARDIOGRAM REPORT: CPT

## 2022-12-19 PROCEDURE — 71045 X-RAY EXAM CHEST 1 VIEW: CPT | Mod: 26

## 2022-12-19 RX ORDER — SODIUM CHLORIDE 9 MG/ML
1000 INJECTION INTRAMUSCULAR; INTRAVENOUS; SUBCUTANEOUS ONCE
Refills: 0 | Status: COMPLETED | OUTPATIENT
Start: 2022-12-19 | End: 2022-12-19

## 2022-12-19 RX ORDER — MEROPENEM 1 G/30ML
500 INJECTION INTRAVENOUS EVERY 24 HOURS
Refills: 0 | Status: DISCONTINUED | OUTPATIENT
Start: 2022-12-19 | End: 2022-12-20

## 2022-12-19 RX ORDER — SODIUM ZIRCONIUM CYCLOSILICATE 10 G/10G
10 POWDER, FOR SUSPENSION ORAL ONCE
Refills: 0 | Status: COMPLETED | OUTPATIENT
Start: 2022-12-19 | End: 2022-12-19

## 2022-12-19 RX ADMIN — SODIUM CHLORIDE 1000 MILLILITER(S): 9 INJECTION INTRAMUSCULAR; INTRAVENOUS; SUBCUTANEOUS at 23:05

## 2022-12-19 RX ADMIN — SODIUM ZIRCONIUM CYCLOSILICATE 10 GRAM(S): 10 POWDER, FOR SUSPENSION ORAL at 22:07

## 2022-12-19 RX ADMIN — SODIUM CHLORIDE 1000 MILLILITER(S): 9 INJECTION INTRAMUSCULAR; INTRAVENOUS; SUBCUTANEOUS at 20:53

## 2022-12-19 NOTE — ED PROVIDER NOTE - CLINICAL SUMMARY MEDICAL DECISION MAKING FREE TEXT BOX
Patient presented from nursing home for reportedly abnormal blood work showing creatinine of 4.2 with a BUN of 92.  Patient also per report has not been tolerating p.o. at the nursing home and there is concern about UTI as patient is status post Macrobid without improvement.  On arrival to ED patient unable to provide significant history, but hemodynamically stable.  Labs obtained which confirmed creatinine of 4.7 with BUN of 97 and hyperkalemia to 5.5 which was treated.  UA showed likely UTI.  Given the above, will treat with IV antibiotics and admit for further monitoring and management.  Hemodynamically stable at time of admission.

## 2022-12-19 NOTE — ED PROVIDER NOTE - OBJECTIVE STATEMENT
86 yo female hx of HTN/DVT/ Heart failure/ s/p pacemaker/ RA/ CKD stage 5 sent from NH 2/2 abnormal lab result. Patient denies any medical complaints and does not know the result of the blood work. as per [facility] SERVIN, patient Crt - was 4.2 and BUN - 92 (3.6 and 80 in Nov 2022) and UCX grew E Coli on 12/06/2022.

## 2022-12-19 NOTE — ED ADULT NURSE NOTE - OBJECTIVE STATEMENT
Pt states her Morgan catheter is not draining and complains of lower backache. Pt states her Morgan catheter is not draining and complains of lower backache. Pt arrived with 14Fr. Morgan catheter.

## 2022-12-19 NOTE — ED ADULT TRIAGE NOTE - CHIEF COMPLAINT QUOTE
Pt here for "abnormals labs and decreased urine output in carlos catheter' as per EMS from [facility] . Unable to find lab report. Pt awake and alert in nad.

## 2022-12-19 NOTE — ED PROVIDER NOTE - CARE PLAN
1 Principal Discharge DX:	Acute on chronic kidney failure  Secondary Diagnosis:	Hyperkalemia  Secondary Diagnosis:	Acute UTI

## 2022-12-19 NOTE — ED PROVIDER NOTE - PHYSICAL EXAMINATION
CONSTITUTIONAL: Elderly female; in no apparent distress.   EYES: PERRL; EOM intact.   ENT: dry lips and tongue  CARDIOVASCULAR: Normal S1, S2; no murmurs, rubs, or gallops.   RESPIRATORY: Normal chest excursion with respiration; breath sounds clear and equal bilaterally; no wheezes, rhonchi, or rales.  GI/: Normal bowel sounds; non-distended; non-tender; no palpable organomegaly.   MS: No edema to b/l   SKIN: poor skin turgor   NEURO/PSYCH: A & O x 3; grossly unremarkable.

## 2022-12-19 NOTE — ED PROVIDER NOTE - PROGRESS NOTE DETAILS
Spoke with NH Doctor Dr Smith, Patient refused IV fluid and not eating/drinking much at NH for the past few days. inserted carlos catheter earlier today without output so he sent patient to NH. also reported it's difficulty to monitor patient's status fluid status 2/2 patient hx of heart failure. Also reported he treated patient UTI with macrobid last week. during evaluation in ED, patient has cloudy urine and only 200cc in carlos bag. UCX from 12/06 reviewed, sensitive to meropenem

## 2022-12-20 ENCOUNTER — TRANSCRIPTION ENCOUNTER (OUTPATIENT)
Age: 87
End: 2022-12-20

## 2022-12-20 LAB
ALBUMIN SERPL ELPH-MCNC: 3.3 G/DL — LOW (ref 3.5–5.2)
ALP SERPL-CCNC: 103 U/L — SIGNIFICANT CHANGE UP (ref 30–115)
ALT FLD-CCNC: 14 U/L — SIGNIFICANT CHANGE UP (ref 0–41)
ANION GAP SERPL CALC-SCNC: 11 MMOL/L — SIGNIFICANT CHANGE UP (ref 7–14)
ANION GAP SERPL CALC-SCNC: 14 MMOL/L — SIGNIFICANT CHANGE UP (ref 7–14)
AST SERPL-CCNC: 30 U/L — SIGNIFICANT CHANGE UP (ref 0–41)
BASOPHILS # BLD AUTO: 0.05 K/UL — SIGNIFICANT CHANGE UP (ref 0–0.2)
BASOPHILS NFR BLD AUTO: 0.7 % — SIGNIFICANT CHANGE UP (ref 0–1)
BILIRUB SERPL-MCNC: 0.2 MG/DL — SIGNIFICANT CHANGE UP (ref 0.2–1.2)
BUN SERPL-MCNC: 102 MG/DL — CRITICAL HIGH (ref 10–20)
BUN SERPL-MCNC: 92 MG/DL — CRITICAL HIGH (ref 10–20)
CALCIUM SERPL-MCNC: 8.9 MG/DL — SIGNIFICANT CHANGE UP (ref 8.4–10.5)
CALCIUM SERPL-MCNC: 9.3 MG/DL — SIGNIFICANT CHANGE UP (ref 8.4–10.5)
CHLORIDE SERPL-SCNC: 102 MMOL/L — SIGNIFICANT CHANGE UP (ref 98–110)
CHLORIDE SERPL-SCNC: 103 MMOL/L — SIGNIFICANT CHANGE UP (ref 98–110)
CO2 SERPL-SCNC: 21 MMOL/L — SIGNIFICANT CHANGE UP (ref 17–32)
CO2 SERPL-SCNC: 23 MMOL/L — SIGNIFICANT CHANGE UP (ref 17–32)
CREAT SERPL-MCNC: 4.2 MG/DL — CRITICAL HIGH (ref 0.7–1.5)
CREAT SERPL-MCNC: 4.4 MG/DL — CRITICAL HIGH (ref 0.7–1.5)
EGFR: 10 ML/MIN/1.73M2 — LOW
EGFR: 9 ML/MIN/1.73M2 — LOW
EOSINOPHIL # BLD AUTO: 0.5 K/UL — SIGNIFICANT CHANGE UP (ref 0–0.7)
EOSINOPHIL NFR BLD AUTO: 6.9 % — SIGNIFICANT CHANGE UP (ref 0–8)
GLUCOSE BLDC GLUCOMTR-MCNC: 116 MG/DL — HIGH (ref 70–99)
GLUCOSE SERPL-MCNC: 128 MG/DL — HIGH (ref 70–99)
GLUCOSE SERPL-MCNC: 85 MG/DL — SIGNIFICANT CHANGE UP (ref 70–99)
HCT VFR BLD CALC: 33 % — LOW (ref 37–47)
HGB BLD-MCNC: 10.7 G/DL — LOW (ref 12–16)
IMM GRANULOCYTES NFR BLD AUTO: 0.6 % — HIGH (ref 0.1–0.3)
LYMPHOCYTES # BLD AUTO: 1.62 K/UL — SIGNIFICANT CHANGE UP (ref 1.2–3.4)
LYMPHOCYTES # BLD AUTO: 22.3 % — SIGNIFICANT CHANGE UP (ref 20.5–51.1)
MAGNESIUM SERPL-MCNC: 2.2 MG/DL — SIGNIFICANT CHANGE UP (ref 1.8–2.4)
MCHC RBC-ENTMCNC: 28.5 PG — SIGNIFICANT CHANGE UP (ref 27–31)
MCHC RBC-ENTMCNC: 32.4 G/DL — SIGNIFICANT CHANGE UP (ref 32–37)
MCV RBC AUTO: 87.8 FL — SIGNIFICANT CHANGE UP (ref 81–99)
MONOCYTES # BLD AUTO: 0.98 K/UL — HIGH (ref 0.1–0.6)
MONOCYTES NFR BLD AUTO: 13.5 % — HIGH (ref 1.7–9.3)
NEUTROPHILS # BLD AUTO: 4.07 K/UL — SIGNIFICANT CHANGE UP (ref 1.4–6.5)
NEUTROPHILS NFR BLD AUTO: 56 % — SIGNIFICANT CHANGE UP (ref 42.2–75.2)
NRBC # BLD: 0 /100 WBCS — SIGNIFICANT CHANGE UP (ref 0–0)
PHOSPHATE SERPL-MCNC: 2.2 MG/DL — SIGNIFICANT CHANGE UP (ref 2.1–4.9)
PLATELET # BLD AUTO: 220 K/UL — SIGNIFICANT CHANGE UP (ref 130–400)
POTASSIUM SERPL-MCNC: 5.2 MMOL/L — HIGH (ref 3.5–5)
POTASSIUM SERPL-MCNC: 5.2 MMOL/L — HIGH (ref 3.5–5)
POTASSIUM SERPL-SCNC: 5.2 MMOL/L — HIGH (ref 3.5–5)
POTASSIUM SERPL-SCNC: 5.2 MMOL/L — HIGH (ref 3.5–5)
PROT SERPL-MCNC: 5.8 G/DL — LOW (ref 6–8)
RBC # BLD: 3.76 M/UL — LOW (ref 4.2–5.4)
RBC # FLD: 14.9 % — HIGH (ref 11.5–14.5)
SODIUM SERPL-SCNC: 136 MMOL/L — SIGNIFICANT CHANGE UP (ref 135–146)
SODIUM SERPL-SCNC: 138 MMOL/L — SIGNIFICANT CHANGE UP (ref 135–146)
WBC # BLD: 7.26 K/UL — SIGNIFICANT CHANGE UP (ref 4.8–10.8)
WBC # FLD AUTO: 7.26 K/UL — SIGNIFICANT CHANGE UP (ref 4.8–10.8)

## 2022-12-20 PROCEDURE — 99221 1ST HOSP IP/OBS SF/LOW 40: CPT | Mod: GC,AI

## 2022-12-20 PROCEDURE — 76770 US EXAM ABDO BACK WALL COMP: CPT | Mod: 26

## 2022-12-20 RX ORDER — AMLODIPINE BESYLATE 2.5 MG/1
5 TABLET ORAL AT BEDTIME
Refills: 0 | Status: DISCONTINUED | OUTPATIENT
Start: 2022-12-20 | End: 2022-12-22

## 2022-12-20 RX ORDER — ALLOPURINOL 300 MG
100 TABLET ORAL DAILY
Refills: 0 | Status: DISCONTINUED | OUTPATIENT
Start: 2022-12-20 | End: 2022-12-29

## 2022-12-20 RX ORDER — PANTOPRAZOLE SODIUM 20 MG/1
40 TABLET, DELAYED RELEASE ORAL
Refills: 0 | Status: DISCONTINUED | OUTPATIENT
Start: 2022-12-20 | End: 2022-12-29

## 2022-12-20 RX ORDER — ISOSORBIDE MONONITRATE 60 MG/1
60 TABLET, EXTENDED RELEASE ORAL DAILY
Refills: 0 | Status: DISCONTINUED | OUTPATIENT
Start: 2022-12-20 | End: 2022-12-29

## 2022-12-20 RX ORDER — CALCIUM CARBONATE 500(1250)
1 TABLET ORAL
Qty: 0 | Refills: 0 | DISCHARGE

## 2022-12-20 RX ORDER — METOPROLOL TARTRATE 50 MG
25 TABLET ORAL DAILY
Refills: 0 | Status: DISCONTINUED | OUTPATIENT
Start: 2022-12-20 | End: 2022-12-29

## 2022-12-20 RX ORDER — SODIUM CHLORIDE 9 MG/ML
1000 INJECTION INTRAMUSCULAR; INTRAVENOUS; SUBCUTANEOUS
Refills: 0 | Status: DISCONTINUED | OUTPATIENT
Start: 2022-12-20 | End: 2022-12-20

## 2022-12-20 RX ORDER — ATORVASTATIN CALCIUM 80 MG/1
20 TABLET, FILM COATED ORAL AT BEDTIME
Refills: 0 | Status: DISCONTINUED | OUTPATIENT
Start: 2022-12-20 | End: 2022-12-29

## 2022-12-20 RX ORDER — SENNA PLUS 8.6 MG/1
2 TABLET ORAL AT BEDTIME
Refills: 0 | Status: DISCONTINUED | OUTPATIENT
Start: 2022-12-20 | End: 2022-12-29

## 2022-12-20 RX ORDER — AZTREONAM 2 G
500 VIAL (EA) INJECTION
Refills: 0 | Status: DISCONTINUED | OUTPATIENT
Start: 2022-12-20 | End: 2022-12-21

## 2022-12-20 RX ORDER — ISOSORBIDE MONONITRATE 60 MG/1
1 TABLET, EXTENDED RELEASE ORAL
Qty: 0 | Refills: 0 | DISCHARGE

## 2022-12-20 RX ORDER — CALCIUM ACETATE 667 MG
1334 TABLET ORAL
Refills: 0 | Status: DISCONTINUED | OUTPATIENT
Start: 2022-12-20 | End: 2022-12-29

## 2022-12-20 RX ORDER — ACETAMINOPHEN 500 MG
650 TABLET ORAL EVERY 6 HOURS
Refills: 0 | Status: DISCONTINUED | OUTPATIENT
Start: 2022-12-20 | End: 2022-12-29

## 2022-12-20 RX ORDER — FOLIC ACID 0.8 MG
1 TABLET ORAL
Qty: 0 | Refills: 0 | DISCHARGE

## 2022-12-20 RX ORDER — SODIUM ZIRCONIUM CYCLOSILICATE 10 G/10G
5 POWDER, FOR SUSPENSION ORAL ONCE
Refills: 0 | Status: COMPLETED | OUTPATIENT
Start: 2022-12-20 | End: 2022-12-20

## 2022-12-20 RX ORDER — MECLIZINE HCL 12.5 MG
12.5 TABLET ORAL THREE TIMES A DAY
Refills: 0 | Status: DISCONTINUED | OUTPATIENT
Start: 2022-12-20 | End: 2022-12-29

## 2022-12-20 RX ORDER — AZTREONAM 2 G
500 VIAL (EA) INJECTION EVERY 12 HOURS
Refills: 0 | Status: DISCONTINUED | OUTPATIENT
Start: 2022-12-20 | End: 2022-12-20

## 2022-12-20 RX ORDER — MIRTAZAPINE 45 MG/1
7.5 TABLET, ORALLY DISINTEGRATING ORAL AT BEDTIME
Refills: 0 | Status: DISCONTINUED | OUTPATIENT
Start: 2022-12-20 | End: 2022-12-29

## 2022-12-20 RX ORDER — OXYCODONE HYDROCHLORIDE 5 MG/1
5 TABLET ORAL EVERY 6 HOURS
Refills: 0 | Status: DISCONTINUED | OUTPATIENT
Start: 2022-12-20 | End: 2022-12-22

## 2022-12-20 RX ORDER — DRONABINOL 2.5 MG
2.5 CAPSULE ORAL DAILY
Refills: 0 | Status: DISCONTINUED | OUTPATIENT
Start: 2022-12-20 | End: 2022-12-22

## 2022-12-20 RX ORDER — LATANOPROST 0.05 MG/ML
1 SOLUTION/ DROPS OPHTHALMIC; TOPICAL
Refills: 0 | Status: DISCONTINUED | OUTPATIENT
Start: 2022-12-20 | End: 2022-12-29

## 2022-12-20 RX ORDER — APIXABAN 2.5 MG/1
2.5 TABLET, FILM COATED ORAL
Refills: 0 | Status: DISCONTINUED | OUTPATIENT
Start: 2022-12-20 | End: 2022-12-29

## 2022-12-20 RX ORDER — TIMOLOL 0.5 %
1 DROPS OPHTHALMIC (EYE)
Refills: 0 | Status: DISCONTINUED | OUTPATIENT
Start: 2022-12-20 | End: 2022-12-29

## 2022-12-20 RX ORDER — SODIUM ZIRCONIUM CYCLOSILICATE 10 G/10G
10 POWDER, FOR SUSPENSION ORAL ONCE
Refills: 0 | Status: DISCONTINUED | OUTPATIENT
Start: 2022-12-20 | End: 2022-12-20

## 2022-12-20 RX ADMIN — SODIUM CHLORIDE 50 MILLILITER(S): 9 INJECTION INTRAMUSCULAR; INTRAVENOUS; SUBCUTANEOUS at 06:15

## 2022-12-20 RX ADMIN — LATANOPROST 1 DROP(S): 0.05 SOLUTION/ DROPS OPHTHALMIC; TOPICAL at 06:14

## 2022-12-20 RX ADMIN — Medication 1334 MILLIGRAM(S): at 17:15

## 2022-12-20 RX ADMIN — APIXABAN 2.5 MILLIGRAM(S): 2.5 TABLET, FILM COATED ORAL at 06:14

## 2022-12-20 RX ADMIN — AMLODIPINE BESYLATE 5 MILLIGRAM(S): 2.5 TABLET ORAL at 21:30

## 2022-12-20 RX ADMIN — MIRTAZAPINE 7.5 MILLIGRAM(S): 45 TABLET, ORALLY DISINTEGRATING ORAL at 21:29

## 2022-12-20 RX ADMIN — Medication 1 DROP(S): at 17:16

## 2022-12-20 RX ADMIN — MEROPENEM 100 MILLIGRAM(S): 1 INJECTION INTRAVENOUS at 06:15

## 2022-12-20 RX ADMIN — Medication 50 MILLIGRAM(S): at 17:16

## 2022-12-20 RX ADMIN — LATANOPROST 1 DROP(S): 0.05 SOLUTION/ DROPS OPHTHALMIC; TOPICAL at 21:29

## 2022-12-20 RX ADMIN — ATORVASTATIN CALCIUM 20 MILLIGRAM(S): 80 TABLET, FILM COATED ORAL at 21:30

## 2022-12-20 RX ADMIN — OXYCODONE HYDROCHLORIDE 5 MILLIGRAM(S): 5 TABLET ORAL at 20:20

## 2022-12-20 RX ADMIN — PANTOPRAZOLE SODIUM 40 MILLIGRAM(S): 20 TABLET, DELAYED RELEASE ORAL at 06:14

## 2022-12-20 RX ADMIN — APIXABAN 2.5 MILLIGRAM(S): 2.5 TABLET, FILM COATED ORAL at 17:15

## 2022-12-20 RX ADMIN — Medication 650 MILLIGRAM(S): at 21:48

## 2022-12-20 RX ADMIN — SENNA PLUS 2 TABLET(S): 8.6 TABLET ORAL at 21:29

## 2022-12-20 RX ADMIN — Medication 1334 MILLIGRAM(S): at 08:09

## 2022-12-20 RX ADMIN — Medication 1 DROP(S): at 06:14

## 2022-12-20 RX ADMIN — OXYCODONE HYDROCHLORIDE 5 MILLIGRAM(S): 5 TABLET ORAL at 21:44

## 2022-12-20 NOTE — H&P ADULT - ASSESSMENT
87 year old female with pmh stated above presented to ED from NH for elevated SCr, subsequently found to have suspected UTI in ED, admitted to medicine for OMERO on CKD5,    #OMERO   -baseline SCr noted to be  87 year old female with pmh stated above presented to ED from NH for elevated SCr, subsequently found to have suspected UTI in ED, admitted to medicine for OMERO on CKD5,    #OMERO   -SCr 4.7 on admission   -baseline SCr noted to be 3.0  per [facility] SERVIN,   -daughter recalls recent Bactrim exposure 2 weeks prior.   -Has been refusing po fluids in NH  -Gentle IVF   -Renal bladder ultrasound   -Hold home lasix   -avoid nephrotoxic agents   -nephrology consult   -Keep carlos to monitor urine output     #CKD 5  -patient follows with outpatient neophrologist   -continue calcium acetate binders     #Hyperkalemia   -K 5.5 on admission, now 5.2 s/p Lokelma   -Low K diet   -Repeat BMP pending     #Suspected UTI  #Dysuria  -UA+ in ed, culture pending   -patient w/culture +ecoli 12/6 supposed course of bactrim prior to admission   -Continue meropenem.     #HFrEF  -last echo in chart from September '22   -s/p AICD   -No evidence of fluid overload on exam   -Hold home lasix dose in the setting of OMERO     #Anxiety/Depression   -Remeron 7.5mg qHS   -Daughter reports that the patient has been withdrawn of recent, consider outpatient Psych follow up on discharge.  -Patient endorses emotionally traumatic episode in NH. Please get  to investigate.     #HLD  -continue lipitor 20mg qHS home dose.     #HTN   -Continue Metoprolol      #Hx of Provoked DVT/PE   -on eliquis 2.5mg BID     #Constipation   -senna 2tabs qHS     Dispo: admit to medicine   Activity: IAT   Diet: Low K Low phos   DVT ppx: home eliquis   GI ppx: protonix daily    87 year old female with pmh stated above presented to ED from NH for elevated SCr, subsequently found to have suspected UTI in ED, admitted to medicine for OMERO on CKD5,    #OMERO   -SCr 4.7 on admission   -baseline SCr noted to be 3.0  per [facility] SERVIN,   -daughter recalls recent Bactrim exposure 2 weeks prior.   -Has been refusing po fluids in NH  -Gentle IVF   -Renal bladder ultrasound   -Hold home lasix   -avoid nephrotoxic agents   -Keep carlos to monitor urine output   -f/u spep, upep, serum immunofixation  -f/u urine lytes and osmolality   PLEASE REACH OUT TO NH IM AM TO FIND  OUT IF PATIENT WAS GIVEN BACTRIM 1 WEEK PRIOR FOR UTI     #CKD 5  -patient follows with outpatient neophrologist   -continue calcium acetate binders     #Hyperkalemia   -K 5.5 on admission, now 5.2 s/p Lokelma   -Low K diet   -Repeat BMP pending     #Suspected UTI  #Dysuria  -UA+ in ed, culture pending   -patient w/culture +ecoli 12/6 supposed course of bactrim prior to admission   -Continue meropenem.     #HFrEF  -last echo in chart from September '22   -s/p AICD   -No evidence of fluid overload on exam   -Hold home lasix dose in the setting of OMERO     #Anxiety/Depression   -Remeron 7.5mg qHS   -Daughter reports that the patient has been withdrawn of recent, consider outpatient Psych follow up on discharge.  -Patient endorses emotionally traumatic episode in NH. Please get  to investigate.     #HLD  -continue lipitor 20mg qHS home dose.     #HTN   -Continue Metoprolol      #Hx of Provoked DVT/PE   -on eliquis 2.5mg BID     #Constipation   -senna 2tabs qHS     Dispo: admit to medicine   Activity: IAT   Diet: Low K Low phos   DVT ppx: home eliquis   GI ppx: protonix daily

## 2022-12-20 NOTE — H&P ADULT - NSHPPHYSICALEXAM_GEN_ALL_CORE
LOS: 1d    VITALS:   T(C): 36.9 (12-20-22 @ 01:17), Max: 37.3 (12-20-22 @ 00:26)  HR: 82 (12-20-22 @ 01:17) (80 - 87)  BP: 126/68 (12-20-22 @ 01:17) (102/56 - 134/69)  RR: 17 (12-20-22 @ 01:17) (16 - 18)  SpO2: 100% (12-20-22 @ 01:17) (92% - 100%)    GENERAL: NAD, lying in bed comfortably  HEAD:  Atraumatic, Normocephalic  EYES: EOMI, PERRLA, conjunctiva and sclera clear  ENT: Moist mucous membranes  NECK: Supple, No JVD  CHEST/LUNG: Clear to auscultation bilaterally; No rales, rhonchi, wheezing, or rubs. Unlabored respirations  HEART: Regular rate and rhythm; No murmurs, rubs, or gallops  ABDOMEN: BSx4; Soft, nontender, nondistended  EXTREMITIES:  2+ Peripheral Pulses, brisk capillary refill. No clubbing, cyanosis, or edema  NERVOUS SYSTEM:  A&Ox3, no focal deficits   AFFECT: withdrawn; flat LOS: 1d    VITALS:   T(C): 36.9 (12-20-22 @ 01:17), Max: 37.3 (12-20-22 @ 00:26)  HR: 82 (12-20-22 @ 01:17) (80 - 87)  BP: 126/68 (12-20-22 @ 01:17) (102/56 - 134/69)  RR: 17 (12-20-22 @ 01:17) (16 - 18)  SpO2: 100% (12-20-22 @ 01:17) (92% - 100%)    GENERAL: NAD, lying in bed comfortably  HEAD:  Atraumatic, Normocephalic  EYES: EOMI, PERRLA, conjunctiva and sclera clear  ENT: Moist mucous membranes  NECK: Supple, No JVD  CHEST/LUNG: Clear to auscultation bilaterally; No rales, rhonchi, wheezing, or rubs. Unlabored respirations  HEART: Regular rate and rhythm; No murmurs, rubs, or gallops  ABDOMEN: BSx4; Soft, nontender, nondistended  EXTREMITIES:  2+ Peripheral Pulses, brisk capillary refill. No clubbing, cyanosis, or edema  NERVOUS SYSTEM:  A&Ox3, no focal deficits No asterixis upper extremities   AFFECT: withdrawn; flat

## 2022-12-20 NOTE — H&P ADULT - NSHPLABSRESULTS_GEN_ALL_CORE
Vitals:  ============  T(F): 98.4 (20 Dec 2022 01:17), Max: 99.2 (20 Dec 2022 00:26)  HR: 82 (20 Dec 2022 01:17)  BP: 126/68 (20 Dec 2022 01:17)  RR: 17 (20 Dec 2022 01:17)  SpO2: 100% (20 Dec 2022 01:17) (92% - 100%)  temp max in last 48H T(F): , Max: 99.2 (12-20-22 @ 00:26)    =======================================================  Current Antibiotics:  meropenem  IVPB 500 milliGRAM(s) IV Intermittent every 24 hours    Other medications:  allopurinol 100 milliGRAM(s) Oral daily  amLODIPine   Tablet 5 milliGRAM(s) Oral at bedtime  apixaban 2.5 milliGRAM(s) Oral two times a day  atorvastatin 20 milliGRAM(s) Oral at bedtime  calcium acetate 1334 milliGRAM(s) Oral three times a day with meals  dronabinol 2.5 milliGRAM(s) Oral daily  isosorbide   mononitrate ER Tablet (IMDUR) 60 milliGRAM(s) Oral daily  latanoprost 0.005% Ophthalmic Solution 1 Drop(s) Both EYES <User Schedule>  metoprolol succinate ER 25 milliGRAM(s) Oral daily  mirtazapine 7.5 milliGRAM(s) Oral at bedtime  senna 2 Tablet(s) Oral at bedtime  sodium zirconium cyclosilicate 10 Gram(s) Oral once  timolol 0.5% Solution 1 Drop(s) Both EYES two times a day      =======================================================  Labs:                        11.3   8.89  )-----------( 262      ( 19 Dec 2022 20:22 )             36.1     12-20    136  |  102  |  102<HH>  ----------------------------<  128<H>  5.2<H>   |  23  |  4.4<HH>    Ca    9.3      20 Dec 2022 00:26    TPro  6.5  /  Alb  3.6  /  TBili  0.2  /  DBili  x   /  AST  32  /  ALT  16  /  AlkPhos  116<H>  12-19      Creatinine, Serum: 4.4 mg/dL (12-20-22 @ 00:26)  Creatinine, Serum: 4.7 mg/dL (12-19-22 @ 20:22)              WBC Count: 8.89 K/uL (12-19-22 @ 20:22)    COVID-19 PCR: NotDetec (12-19-22 @ 20:22)      Alkaline Phosphatase, Serum: 116 U/L (12-19-22 @ 20:22)  Alanine Aminotransferase (ALT/SGPT): 16 U/L (12-19-22 @ 20:22)  Aspartate Aminotransferase (AST/SGOT): 32 U/L (12-19-22 @ 20:22)  Bilirubin Total, Serum: 0.2 mg/dL (12-19-22 @ 20:22)

## 2022-12-20 NOTE — H&P ADULT - ATTENDING COMMENTS
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 Per daughter at bedside, patient was dc'ed to NH after a hospitalization a few months ago, where she began to become progressively more withdrawn first refusing feeds now frequently refusing po fluids aswell. Of note she was diagnosed with a uti on 12/6 (ECOLI)  and per daughter is s/p course of po bactrim which was given to her by the NH doctor  (cannot confirm). Currently endorsing feelings of sadness after having had a traumatic experience at the nursing home several days ago. Endorses dysuria, denies hematuria, hesitancy or frequency. Denies nausea, fever or chills. Morgan was placed by nursing home staff for poor urine output.   In the ed vitals were stable, labs notable for hyperkalemia and +ua urine culture pending. Patient received 1 dose of Meropenem in the ed (documented cephalosporin allergy). CXR performed in the ED pending read (my read: no acute cardiopulmonary pathology).    Patient will be admitted to medicine for OMERO.    (20 Dec 2022 02:15)    REVIEW OF SYSTEMS: see cc/HPI   CONSTITUTIONAL: No weakness, fevers or chills  EYES/ENT: No visual changes;  No vertigo or throat pain   NECK: No pain or stiffness  RESPIRATORY: No cough, wheezing, hemoptysis; No shortness of breath  CARDIOVASCULAR: No chest pain or palpitations  GASTROINTESTINAL: No abdominal or epigastric pain. No nausea, vomiting, or hematemesis; No diarrhea or constipation. No melena or hematochezia.  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 Per daughter at bedside, patient was dc'ed to NH after a hospitalization a few months ago, where she began to become progressively more withdrawn first refusing feeds now frequently refusing po fluids aswell. Of note she was diagnosed with a uti on 12/6 (ECOLI)  and per daughter is s/p course of po bactrim which was given to her by the NH doctor  (cannot confirm). Currently endorsing feelings of sadness after having had a traumatic experience at the nursing home several days ago. Endorses dysuria, denies hematuria, hesitancy or frequency. Denies nausea, fever or chills. Morgan was placed by nursing home staff for poor urine output.   In the ed vitals were stable, labs notable for hyperkalemia and +ua urine culture pending. Patient received 1 dose of Meropenem in the ed (documented cephalosporin allergy). CXR performed in the ED pending read (my read: no acute cardiopulmonary pathology).    Patient will be admitted to medicine for OMERO.    (20 Dec 2022 02:15)    REVIEW OF SYSTEMS: see cc/HPI   CONSTITUTIONAL: No weakness, fevers or chills  EYES/ENT: No visual changes;  No vertigo or throat pain   NECK: No pain or stiffness  RESPIRATORY: No cough, wheezing, hemoptysis; No shortness of breath  CARDIOVASCULAR: No chest pain or palpitations  GASTROINTESTINAL: No abdominal or epigastric pain. No nausea, vomiting, or hematemesis; No diarrhea or constipation. No melena or hematochezia.  GENITOURINARY: No dysuria, frequency or hematuria  NEUROLOGICAL: No numbness or weakness  SKIN: No itching, rashes    Physical Exam:   General: WN/WD NAD, elderly and frail  Neurology: A&Ox3, nonfocal, follows commands  Eyes: PERRLA/ EOMI  ENT/Neck: Neck supple, trachea midline, No JVD  Respiratory: CTA B/L, No wheezing, rales, rhonchi  CV: Normal rate regular rhythm, S1S2, no murmurs, rubs or gallops  Abdominal: Soft, NT, ND +BS,   Extremities: No edema, + peripheral pulses  Skin: No Rashes, Hematoma, Ecchymosis  Incisions:   Tubes:    A/P   OMERO on CKD - possibly related to use of Bactrim r/o acute interstitial nephritis   -admit to floor  -Urine lytes/ Cr   -IV fluids   -serial BMP /iPhos / Mg++  -Nephrology eval   -avoid nephrotoxic Rx   -renal+ bladder U/s    Hyperkalemia ( related to OMERO / CKD and /or Bactrim related)   -agree /w low K diet   -serial BMP     Recurrent UTI - UA (+) and Dysuria   -c/w Abx  -check blood Cx  -F/u Ucx     HFrEF s/p AICD  -while hydrating - avoid fluid overload   Anxiety / depression   -c/w outpatient Rx     Dyslipidemia     HTN - stable   -metoprolol     H/o provoked DVT/PE  - c/w Eliquis - renal dosing     Constipation   -Senna

## 2022-12-20 NOTE — DISCHARGE NOTE NURSING/CASE MANAGEMENT/SOCIAL WORK - PATIENT PORTAL LINK FT
You can access the FollowMyHealth Patient Portal offered by [facility] by registering at the following website: [email or web address]. By joining [facility]’s FollowMyHealth portal, you will also be able to view your health information using other applications (apps) compatible with our system.

## 2022-12-20 NOTE — H&P ADULT - NSHPREVIEWOFSYSTEMS_GEN_ALL_CORE
REVIEW OF SYSTEMS:    CONSTITUTIONAL: No weakness, fevers or chills  EYES/ENT: No visual changes;  No vertigo or throat pain   NECK: No pain or stiffness  RESPIRATORY: No cough, wheezing, hemoptysis; No shortness of breath  CARDIOVASCULAR: No chest pain or palpitations  GASTROINTESTINAL: No abdominal or epigastric pain. No nausea, vomiting, or hematemesis; No diarrhea or constipation. No melena or hematochezia.  GENITOURINARY: +dysuria, frequency or hematuria  NEUROLOGICAL: No numbness or weakness  SKIN: No itching, rashes  PSYCH: sad that she cannot be home with her family

## 2022-12-20 NOTE — PHYSICAL THERAPY INITIAL EVALUATION ADULT - SPECIFY REASON(S)
Event Note PT attempted to see the patient for PT IE. Patient declined participating in PT at this time. Patient verbalized that she was too tired and can't do therapy today. PT will f/u when appropriate.

## 2022-12-20 NOTE — PATIENT PROFILE ADULT - FALL HARM RISK - HARM RISK INTERVENTIONS

## 2022-12-20 NOTE — DISCHARGE NOTE NURSING/CASE MANAGEMENT/SOCIAL WORK - NSDCVIVACCINE_GEN_ALL_CORE_FT
Child health report & Tuberculosis form in nurses [facility]  fax [phone]
No Vaccines Administered.

## 2022-12-20 NOTE — H&P ADULT - HISTORY OF PRESENT ILLNESS
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 Per daughter at bedside, patient was dc'ed to NH after a hospitalization a few months ago, where she began to become progressively more withdrawn first refusing feeds now frequently refusing po fluids aswell. Of note she was diagnosed with a uti on 12/6 (ECOLI)  and per daughter is s/p course of po bactrim which was given to her by the NH doctor  (cannot confirm). Currently endorsing feelings of sadness after having had a traumatic experience at the nursing home several days ago. Endorses dysuria, denies hematuria, hesitancy or frequency. Denies nausea, fever or chills. Morgan was placed by nursing home staff for poor urine output.   In the ed vitals were stable, labs notable for hyperkalemia and +ua urine culture pending. Patient received 1 dose of Meropenem in the ed (documented cephalosporin allergy). CXR performed in the ED pending read (my read: no acute cardiopulmonary pathology).    Patient will be admitted to medicine for OMERO.            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 Per daughter at bedside, patient was dc'ed to NH after a hospitalization a few months ago, where she began to become progressively more withdrawn first refusing feeds now frequently refusing po fluids aswell. Of note she was diagnosed with a uti on 12/6 (ECOLI)  and per daughter is s/p course of po bactrim which was given to her by the NH doctor  (cannot confirm). Currently endorsing feelings of sadness after having had a traumatic experience at the nursing home several days ago. Endorses dysuria, denies hematuria, hesitancy or frequency. Denies nausea, fever or chills. Morgan was placed by nursing home staff for poor urine output.   In the ed vitals were stable, labs notable for hyperkalemia and +ua urine culture pending. Patient received 1 dose of Meropenem in the ed (documented cephalosporin allergy). CXR performed in the ED pending read (my read: no acute cardiopulmonary pathology).    Patient will be admitted to medicine for OMERO.

## 2022-12-21 ENCOUNTER — TRANSCRIPTION ENCOUNTER (OUTPATIENT)
Age: 87
End: 2022-12-21

## 2022-12-21 LAB
A1C WITH ESTIMATED AVERAGE GLUCOSE RESULT: 6 % — HIGH (ref 4–5.6)
ALBUMIN SERPL ELPH-MCNC: 3.4 G/DL — LOW (ref 3.5–5.2)
ALP SERPL-CCNC: 111 U/L — SIGNIFICANT CHANGE UP (ref 30–115)
ALT FLD-CCNC: 15 U/L — SIGNIFICANT CHANGE UP (ref 0–41)
ANION GAP SERPL CALC-SCNC: 14 MMOL/L — SIGNIFICANT CHANGE UP (ref 7–14)
AST SERPL-CCNC: 34 U/L — SIGNIFICANT CHANGE UP (ref 0–41)
BASOPHILS # BLD AUTO: 0.07 K/UL — SIGNIFICANT CHANGE UP (ref 0–0.2)
BASOPHILS NFR BLD AUTO: 0.8 % — SIGNIFICANT CHANGE UP (ref 0–1)
BILIRUB SERPL-MCNC: 0.3 MG/DL — SIGNIFICANT CHANGE UP (ref 0.2–1.2)
BUN SERPL-MCNC: 90 MG/DL — CRITICAL HIGH (ref 10–20)
CALCIUM SERPL-MCNC: 9.2 MG/DL — SIGNIFICANT CHANGE UP (ref 8.4–10.5)
CHLORIDE SERPL-SCNC: 103 MMOL/L — SIGNIFICANT CHANGE UP (ref 98–110)
CO2 SERPL-SCNC: 22 MMOL/L — SIGNIFICANT CHANGE UP (ref 17–32)
CREAT SERPL-MCNC: 3.7 MG/DL — HIGH (ref 0.7–1.5)
EGFR: 11 ML/MIN/1.73M2 — LOW
EOSINOPHIL # BLD AUTO: 0.61 K/UL — SIGNIFICANT CHANGE UP (ref 0–0.7)
EOSINOPHIL NFR BLD AUTO: 6.8 % — SIGNIFICANT CHANGE UP (ref 0–8)
ESTIMATED AVERAGE GLUCOSE: 126 MG/DL — HIGH (ref 68–114)
GLUCOSE SERPL-MCNC: 82 MG/DL — SIGNIFICANT CHANGE UP (ref 70–99)
HCT VFR BLD CALC: 36.4 % — LOW (ref 37–47)
HGB BLD-MCNC: 11.7 G/DL — LOW (ref 12–16)
IMM GRANULOCYTES NFR BLD AUTO: 0.8 % — HIGH (ref 0.1–0.3)
KAPPA LC SER QL IFE: 13.15 MG/DL — HIGH (ref 0.33–1.94)
KAPPA/LAMBDA FREE LIGHT CHAIN RATIO, SERUM: 1.2 RATIO — SIGNIFICANT CHANGE UP (ref 0.26–1.65)
LAMBDA LC SER QL IFE: 10.99 MG/DL — HIGH (ref 0.57–2.63)
LYMPHOCYTES # BLD AUTO: 1.83 K/UL — SIGNIFICANT CHANGE UP (ref 1.2–3.4)
LYMPHOCYTES # BLD AUTO: 20.5 % — SIGNIFICANT CHANGE UP (ref 20.5–51.1)
MAGNESIUM SERPL-MCNC: 2.1 MG/DL — SIGNIFICANT CHANGE UP (ref 1.8–2.4)
MCHC RBC-ENTMCNC: 28.3 PG — SIGNIFICANT CHANGE UP (ref 27–31)
MCHC RBC-ENTMCNC: 32.1 G/DL — SIGNIFICANT CHANGE UP (ref 32–37)
MCV RBC AUTO: 88.1 FL — SIGNIFICANT CHANGE UP (ref 81–99)
MONOCYTES # BLD AUTO: 1.04 K/UL — HIGH (ref 0.1–0.6)
MONOCYTES NFR BLD AUTO: 11.6 % — HIGH (ref 1.7–9.3)
NEUTROPHILS # BLD AUTO: 5.32 K/UL — SIGNIFICANT CHANGE UP (ref 1.4–6.5)
NEUTROPHILS NFR BLD AUTO: 59.5 % — SIGNIFICANT CHANGE UP (ref 42.2–75.2)
NRBC # BLD: 0 /100 WBCS — SIGNIFICANT CHANGE UP (ref 0–0)
PHOSPHATE SERPL-MCNC: 3.2 MG/DL — SIGNIFICANT CHANGE UP (ref 2.1–4.9)
PLATELET # BLD AUTO: 211 K/UL — SIGNIFICANT CHANGE UP (ref 130–400)
POTASSIUM SERPL-MCNC: 5 MMOL/L — SIGNIFICANT CHANGE UP (ref 3.5–5)
POTASSIUM SERPL-SCNC: 5 MMOL/L — SIGNIFICANT CHANGE UP (ref 3.5–5)
PROT SERPL-MCNC: 6.2 G/DL — SIGNIFICANT CHANGE UP (ref 6–8)
RBC # BLD: 4.13 M/UL — LOW (ref 4.2–5.4)
RBC # FLD: 15.2 % — HIGH (ref 11.5–14.5)
SODIUM SERPL-SCNC: 139 MMOL/L — SIGNIFICANT CHANGE UP (ref 135–146)
TSH SERPL-MCNC: 1.24 UIU/ML — SIGNIFICANT CHANGE UP (ref 0.27–4.2)
WBC # BLD: 8.94 K/UL — SIGNIFICANT CHANGE UP (ref 4.8–10.8)
WBC # FLD AUTO: 8.94 K/UL — SIGNIFICANT CHANGE UP (ref 4.8–10.8)

## 2022-12-21 PROCEDURE — 99233 SBSQ HOSP IP/OBS HIGH 50: CPT

## 2022-12-21 RX ADMIN — SENNA PLUS 2 TABLET(S): 8.6 TABLET ORAL at 21:56

## 2022-12-21 RX ADMIN — Medication 1334 MILLIGRAM(S): at 08:08

## 2022-12-21 RX ADMIN — MIRTAZAPINE 7.5 MILLIGRAM(S): 45 TABLET, ORALLY DISINTEGRATING ORAL at 21:59

## 2022-12-21 RX ADMIN — Medication 25 MILLIGRAM(S): at 05:25

## 2022-12-21 RX ADMIN — LATANOPROST 1 DROP(S): 0.05 SOLUTION/ DROPS OPHTHALMIC; TOPICAL at 21:56

## 2022-12-21 RX ADMIN — Medication 1 DROP(S): at 05:25

## 2022-12-21 RX ADMIN — Medication 2.5 MILLIGRAM(S): at 13:15

## 2022-12-21 RX ADMIN — Medication 1334 MILLIGRAM(S): at 17:42

## 2022-12-21 RX ADMIN — ATORVASTATIN CALCIUM 20 MILLIGRAM(S): 80 TABLET, FILM COATED ORAL at 21:59

## 2022-12-21 RX ADMIN — Medication 650 MILLIGRAM(S): at 13:14

## 2022-12-21 RX ADMIN — APIXABAN 2.5 MILLIGRAM(S): 2.5 TABLET, FILM COATED ORAL at 17:42

## 2022-12-21 RX ADMIN — LATANOPROST 1 DROP(S): 0.05 SOLUTION/ DROPS OPHTHALMIC; TOPICAL at 05:26

## 2022-12-21 RX ADMIN — Medication 1 DROP(S): at 17:42

## 2022-12-21 RX ADMIN — AMLODIPINE BESYLATE 5 MILLIGRAM(S): 2.5 TABLET ORAL at 21:59

## 2022-12-21 RX ADMIN — APIXABAN 2.5 MILLIGRAM(S): 2.5 TABLET, FILM COATED ORAL at 05:25

## 2022-12-21 RX ADMIN — PANTOPRAZOLE SODIUM 40 MILLIGRAM(S): 20 TABLET, DELAYED RELEASE ORAL at 05:25

## 2022-12-21 NOTE — DISCHARGE NOTE PROVIDER - ATTENDING DISCHARGE PHYSICAL EXAMINATION:
Vital Signs Last 24 Hrs  T(F): 97.3 (29 Dec 2022 13:00), Max: 98.5 (28 Dec 2022 21:41)  HR: 75 (29 Dec 2022 13:00) (75 - 81)  BP: 123/64 (29 Dec 2022 13:00) (113/67 - 147/80)  RR: 20 (29 Dec 2022 13:00) (18 - 20)  SpO2: 98% (29 Dec 2022 05:16) (98% - 99%)    PHYSICAL EXAM:  GENERAL: NAD, well-groomed, well-developed  HEAD:  Atraumatic, Normocephalic  EYES: EOMI, conjunctiva and sclera clear  ENMT: Moist mucous membranes, Good dentition, no thrush  NECK: Supple, No JVD  CHEST/LUNG: Clear to auscultation bilaterally, good air entry, non-labored breathing  HEART: RRR; S1/S2, 2/6 systolic murmur   ABDOMEN: Soft, Nontender, Nondistended; Bowel sounds present  VASCULAR: Normal pulses, Normal capillary refill  EXTREMITIES: No calf tenderness, No cyanosis, No edema  LYMPH: Normal; No lymphadenopathy noted  SKIN: Warm, Intact  PSYCH: Normal mood, Normal affect  NERVOUS SYSTEM:  Alert, Good concentration; CN 2-12 intact, No focal deficits

## 2022-12-21 NOTE — DISCHARGE NOTE PROVIDER - CARE PROVIDER_API CALL
Wade Celeste)  Internal Medicine  [address] [place] [address]  Phone: [phone]  Fax: [phone]  Follow Up Time:     Kamini Contreras)  Internal Medicine; Nephrology  [address] [place] [address]  Phone: [phone]  Fax: [phone]  Follow Up Time:

## 2022-12-21 NOTE — PHYSICAL THERAPY INITIAL EVALUATION ADULT - PERTINENT HX OF CURRENT PROBLEM, REHAB EVAL
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 Per daughter at bedside, patient was dc'ed to NH after a hospitalization a few months ago, where she began to become progressively more withdrawn first refusing feeds now frequently refusing po fluids aswell. Of note she was diagnosed with a UTI on 12/6 (ECOLI)  and per daughter is s/p course of po bactrim which was given to her by the NH doctor  (cannot confirm). Currently endorsing feelings of sadness after having had a traumatic experience at the nursing home several days ago. Endorses dysuria, denies hematuria, hesitancy or frequency. Denies nausea, fever or chills. Morgan was placed by nursing home staff for poor urine output.  In the ed vitals were stable, labs notable for hyperkalemia and +ua urine culture pending. Patient received 1 dose of Meropenem in the ed (documented cephalosporin allergy). CXR performed in the ED pending read (my read: no acute cardiopulmonary pathology). Patient will be admitted to medicine for OMERO.

## 2022-12-21 NOTE — DISCHARGE NOTE PROVIDER - NSDCFUSCHEDAPPT_GEN_ALL_CORE_FT
[facility] PreAdmits  Scheduled Appointment: 12/26/2022    [facility]terling Mckinney Physician [facility]  CARDIOLOGY [address] [place] [address]  Scheduled Appointment: 01/03/2023     Sterling Mckinney  [facility] Physician [facility]  CARDIOLOGY [address] [place] [address]  Scheduled Appointment: 01/17/2023

## 2022-12-21 NOTE — PROGRESS NOTE ADULT - SUBJECTIVE AND OBJECTIVE BOX
Medical Student Note    Patient Information:  RACHEL SUMMERS / 85y / Female / MRN#:[id number]    Hospital Day: 2     HPI:  86 yo F with pmhx of CHF, s/p pacemaker, DVT (on Eloquis), RA, gout, HTN, CKD was sent in to the ED by PMD Dr. Celeste for evaluation of worsening dyspnea on exertion which was associated with Left calf pain for few days. She also complained of dysuria and left sided flank pain x 2 days which was localized, non radiating, moderate in intensity and worsened with movement. Dyspnea and lower extremity swelling is exacerbated by walking. She denied any hematuria, fever, chills, paresthesias, nausea, vomiting, headache, dizziness.    Vital Signs Last 24 Hrs  T(C): 37.8 (21 Dec 2022 12:10), Max: 38 (20 Dec 2022 21:51)  T(F): 100 (21 Dec 2022 12:10), Max: 100.4 (20 Dec 2022 21:51)  HR: 82 (21 Dec 2022 12:10) (82 - 99)  BP: 136/69 (21 Dec 2022 12:10) (135/74 - 141/73)  RR: 19 (21 Dec 2022 12:10) (17 - 20)  SpO2: 98% (21 Dec 2022 04:20) (98% - 98%)      Patient was received hypertensive in ER. Blood work showed mild anaemia, hyperkalemia, elevated creatinine and reduced GFR (unchanged from last blood work). Troponin were negative, BNP was elevated >22k. UA and COVID-19 was negative. CT abdomen and pelvis and CT chest showed Bibasilar peripheral opacities likely on the basis of subsegmental atelectasis. Patient is being admitted for monitoring and r/o volume overload secondary to kidney dysfunction versus CHF.      Attd: pt has long standing RA and chr body aches, jacob of left arm and back; she has tried RA meds in past, but they do not agree w/ her body; pt does NOT like to take strong narcotics; she says lidoderm helps; she is agreeable to tylenol and a muscle relaxant; she also has gout; pain is worse w/ mvmt, so seems musculoskel; pt was c/o SOB, but does not seem too vol overload; she denies palpitations or CP; she is usually not on home O2; she lives in a house w/ her son and her dtr has a day care on the second floor; she does not want to go to SNF, but she would like home PT for her unsteady gait; she is Religious; she can eat and drink; she is well oriented and knows her med hx fairly well     Interval History:  Patient seen and examined at bedside. No acute events overnight. She reported excruciating back pain last night but this morning she is feeling improvement. She has been off the O2 and is 98% on room air. She had a bowel movement last night. She feels that she might be ready to go home tomorrow.      Past Medical History:  DVT, lower extremity  Rheumatoid arthritis  Diastolic heart failure  Atrial fibrillation  Diverticulitis  Gout  Hypertension  Pacemaker  Chronic kidney disease    Past Surgical History:  History of hysterectomy  History of tonsillectomy  History of appendectomy  Artificial pacemaker    Allergies:  Keflex (Swelling)    Medications:  PRN:  aluminum hydroxide/magnesium hydroxide/simethicone Suspension 30 milliLiter(s) Oral every 4 hours PRN Dyspepsia  morphine   Solution 2 milliGRAM(s) Oral every 8 hours PRN Severe Pain (7 - 10)    Home:  apixaban 2.5 mg oral tablet: 1 tab(s) orally 2 times a day  atorvastatin 40 mg oral tablet: 1 tab(s) orally once a day  calcium acetate 667 mg oral tablet: 1 tab(s) orally once a day  folic acid 1 mg oral tablet: 1 tab(s) orally once a day  furosemide 20 mg oral tablet: 1 tab(s) orally Tuesday, Thursday, Saturday,   latanoprost 0.005% ophthalmic solution: 1 drop(s) to each affected eye once a day (in the evening)  Metoprolol Succinate ER 25 mg oral tablet, extended release: 1 tab(s) orally once a day  sertraline 50 mg oral tablet: 1 tab(s) orally once a day    Vitals:  T(C): 37.8 (21 Dec 2022 12:10), Max: 38 (20 Dec 2022 21:51)  T(F): 100 (21 Dec 2022 12:10), Max: 100.4 (20 Dec 2022 21:51)  HR: 82 (21 Dec 2022 12:10) (82 - 99)  BP: 136/69 (21 Dec 2022 12:10) (135/74 - 141/73)  RR: 19 (21 Dec 2022 12:10) (17 - 20)  SpO2: 98% (21 Dec 2022 04:20) (98% - 98%)    Physical Exam:  General: lethargic today 1st day with her (before was more awake per team)   Head: Normocephalic atraumatic.  Neck: Supple  Heart: Regular rate and rhythm; S1, S2; No murmurs.  Lungs: Clear to auscultation bilaterally.  Abdomen: Soft, nontender, nondistended.  Extremities: No edema in upper or lower extremities.  Neuro: AAOx2, No focal deficits.    LABS:                         11.7   8.94  )-----------( 211      ( 21 Dec 2022 07:39 )             36.4         139  |  103  |  90<HH>  ----------------------------<  82  5.0   |  22  |  3.7<H>    Ca    9.2      21 Dec 2022 07:39  Phos  3.2       Mg     2.1         TPro  6.2  /  Alb  3.4<L>  /  TBili  0.3  /  DBili  x   /  AST  34  /  ALT  15  /  AlkPhos  111      Urinalysis Basic (12-15 @ 00:00)  Color: Colorless  Appearance: Clear  S.009  Ketone: Negative  Bili: Negative  Urobili: <2 mg/dL   Protein: 30 mg/dL  Nitrite: Negative   Leuk Esterase: Negative  RBC: 1  WBC: 0   Sq Epi:   Non Sq Epi: 0  Bacteria: Negative    Microbiology:  Culture - Urine (collected 12-15 @ 00:00)  Source: .Urine Clean Catch (Midstream)  Final Report ( @ 09:15):  <10,000 CFU/mL Normal Urogenital Rand    Radiology: reviewed     MEDICATIONS  (STANDING):  allopurinol 100 milliGRAM(s) Oral daily  amLODIPine   Tablet 5 milliGRAM(s) Oral at bedtime  apixaban 2.5 milliGRAM(s) Oral two times a day  atorvastatin 20 milliGRAM(s) Oral at bedtime  calcium acetate 1334 milliGRAM(s) Oral three times a day with meals  dronabinol 2.5 milliGRAM(s) Oral daily  isosorbide   mononitrate ER Tablet (IMDUR) 60 milliGRAM(s) Oral daily  latanoprost 0.005% Ophthalmic Solution 1 Drop(s) Both EYES <User Schedule>  metoprolol succinate ER 25 milliGRAM(s) Oral daily  mirtazapine 7.5 milliGRAM(s) Oral at bedtime  pantoprazole    Tablet 40 milliGRAM(s) Oral before breakfast  senna 2 Tablet(s) Oral at bedtime  timolol 0.5% Solution 1 Drop(s) Both EYES two times a day    MEDICATIONS  (PRN):  acetaminophen     Tablet .. 650 milliGRAM(s) Oral every 6 hours PRN Temp greater or equal to 38C (100.4F)  meclizine 12.5 milliGRAM(s) Oral three times a day PRN Dizziness  oxyCODONE    IR 5 milliGRAM(s) Oral every 6 hours PRN Severe Pain (7 - 10)

## 2022-12-21 NOTE — PHYSICAL THERAPY INITIAL EVALUATION ADULT - BED MOBILITY LIMITATIONS, REHAB EVAL
General: decreased ability to use arms for pushing/pulling/decreased ability to use legs for bridging/pushing/impaired ability to control trunk for mobility

## 2022-12-21 NOTE — PHYSICAL THERAPY INITIAL EVALUATION ADULT - GENERAL OBSERVATIONS, REHAB EVAL
15:15-15:35. Pt encountered semifowler sleeping in NAD, was awoken by PT for PT Eval, pt did not oppose with encouragement, however had diffculty keeping her eyes open.

## 2022-12-21 NOTE — DISCHARGE NOTE PROVIDER - NSDCMRMEDTOKEN_GEN_ALL_CORE_FT
allopurinol 100 mg oral tablet: 1 tab(s) orally once a day  amLODIPine 5 mg oral tablet: 1 tab(s) orally once a day (at bedtime)  apixaban 2.5 mg oral tablet: 1 tab(s) orally 2 times a day  calcium acetate 667 mg oral capsule: 2 cap(s) orally 3 times a day (with meals)  docusate sodium 100 mg oral tablet: 2 tab(s) orally once a day (at bedtime), As Needed constipation  dronabinol 2.5 mg oral capsule: 1 tab(s) orally once a day  isosorbide mononitrate 60 mg oral tablet, extended release: 1 tab(s) orally once a day (in the morning)  Lasix 40 mg oral tablet: 1 tab(s) orally 2 times a day  latanoprost 0.005% ophthalmic solution: 1 each to each affected eye 2 times a day  Lipitor 20 mg oral tablet: 1 tab(s) orally once a day (at bedtime)  meclizine 12.5 mg oral tablet: 1 tab(s) orally 3 times a day, As Needed dizziness  metoprolol succinate 25 mg oral capsule, extended release: 1 cap(s) orally once a day  mirtazapine 7.5 mg oral tablet: 1 tab(s) orally once a day (at bedtime)  oxyCODONE 5 mg oral tablet: 1 tab(s) orally every 6 hours, As Needed moderate pain   Timolol Maleate, Ophthalmic 0.5% preservative-free ophthalmic solution: 1 drop(s) to both eyes 2 times a day   allopurinol 100 mg oral tablet: 1 tab(s) orally once a day  amLODIPine 5 mg oral tablet: 1 tab(s) orally once a day (at bedtime)  apixaban 2.5 mg oral tablet: 1 tab(s) orally 2 times a day  calcium acetate 667 mg oral capsule: 2 cap(s) orally 3 times a day (with meals)  docusate sodium 100 mg oral tablet: 2 tab(s) orally once a day (at bedtime), As Needed constipation  dronabinol 2.5 mg oral capsule: 1 tab(s) orally once a day  isosorbide mononitrate 60 mg oral tablet, extended release: 1 tab(s) orally once a day (in the morning)  latanoprost 0.005% ophthalmic solution: 1 each to each affected eye 2 times a day  Lipitor 20 mg oral tablet: 1 tab(s) orally once a day (at bedtime)  meclizine 12.5 mg oral tablet: 1 tab(s) orally 3 times a day, As Needed dizziness  metoprolol succinate 25 mg oral capsule, extended release: 1 cap(s) orally once a day  mirtazapine 7.5 mg oral tablet: 1 tab(s) orally once a day (at bedtime)  oxyCODONE 5 mg oral tablet: 1 tab(s) orally every 6 hours, As Needed moderate pain   Timolol Maleate, Ophthalmic 0.5% preservative-free ophthalmic solution: 1 drop(s) to both eyes 2 times a day   allopurinol 100 mg oral tablet: 1 tab(s) orally once a day  amLODIPine 5 mg oral tablet: 1 tab(s) orally once a day (at bedtime)  apixaban 2.5 mg oral tablet: 1 tab(s) orally 2 times a day  calcium acetate 667 mg oral capsule: 2 cap(s) orally 3 times a day (with meals)  docusate sodium 100 mg oral tablet: 2 tab(s) orally once a day (at bedtime), As Needed constipation  dronabinol 2.5 mg oral capsule: 1 tab(s) orally once a day  isosorbide mononitrate 60 mg oral tablet, extended release: 1 tab(s) orally once a day (in the morning)  latanoprost 0.005% ophthalmic solution: 1 each to each affected eye 2 times a day  Lipitor 20 mg oral tablet: 1 tab(s) orally once a day (at bedtime)  meclizine 12.5 mg oral tablet: 1 tab(s) orally 3 times a day, As Needed dizziness  metoprolol succinate 25 mg oral capsule, extended release: 1 cap(s) orally once a day  mirtazapine 7.5 mg oral tablet: 1 tab(s) orally once a day (at bedtime)  oxyCODONE 5 mg oral tablet: 1 tab(s) orally every 6 hours, As Needed -for severe pain  moderate pain MDD:4 tablets   Timolol Maleate, Ophthalmic 0.5% preservative-free ophthalmic solution: 1 drop(s) to both eyes 2 times a day

## 2022-12-21 NOTE — PHYSICAL THERAPY INITIAL EVALUATION ADULT - RANGE OF MOTION EXAMINATION, REHAB EVAL
R UE grossly WFL AAROM. L UE: was limited with pain upon mobiity with pt screaming out and pain with opening hand and moving arm. B LE's grossly WFL AAROM/PROM

## 2022-12-21 NOTE — DISCHARGE NOTE PROVIDER - HOSPITAL COURSE
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 Per daughter at bedside, patient was dc'ed to NH after a hospitalization a few months ago, where she began to become progressively more withdrawn first refusing feeds now frequently refusing po fluids as well.  Of note she was diagnosed with a uti on 12/6 (ECOLI)  and per daughter is s/p course of po bactrim which was given to her by the NH doctor  (cannot confirm). Currently endorsing feelings of sadness after having had a traumatic experience at the nursing home several days ago. Endorses dysuria, denies hematuria, hesitancy or frequency. Denied nausea, fever or chills. Morgan was placed by nursing home staff for poor urine output.     In the ed vitals were stable, no sepsis on admission. Labs notable for hyperkalemia, +ua, ANA MARIA on CKD. (Cr 4.6 on admission, bilateral 3.0) Patient received 1 dose of Meropenem in the ed (documented cephalosporin allergy) and completed course of aztreonam. Urine growing > 100,00 E. Coli. Patient's Ana Maria resolved. PT evaluated patient and recommended _____. Patient's daughter would like to take her home.     Patient is stable for home discharge.    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 Per daughter at bedside, patient was dc'ed to NH after a hospitalization a few months ago, where she began to become progressively more withdrawn first refusing feeds now frequently refusing po fluids as well.  Of note she was diagnosed with a uti on 12/6 (ECOLI)  and per daughter is s/p course of po bactrim which was given to her by the NH doctor  (cannot confirm). Currently endorsing feelings of sadness after having had a traumatic experience at the nursing home several days ago. Endorses dysuria, denies hematuria, hesitancy or frequency. Denied nausea, fever or chills. Morgan was placed by nursing home staff for poor urine output.     In the ed vitals were stable, no sepsis on admission. Labs notable for hyperkalemia, +ua, ANA MARIA on CKD. (Cr 4.6 on admission, bilateral 3.0) Patient received 1 dose of Meropenem in the ed (documented cephalosporin allergy) and completed course of aztreonam. Urine growing > 100,00 E. Coli. Patient's Ana Maria resolved. PT evaluated patient and recommended PRETTY. Patient's daughter would like to take her home.       #ANA MARIA on CKD5   Patient creatinine stable at 2.0, was kept on IVF given the poor PO intake. The patient was kept on calcium acetate binders and  follows with outpatient nephrologist       #Suspected UTI  #Dysuria  -UA+ in ed, patient w/culture +ecoli 12/6 s/p course of bactrim prior to admission  and completed Aztreonam for now (PCN Allergy)     Course was complicated by failure to thrive:  Patient had low BMI during this admission and the daughter no longer wants hospice and requesting  home care service and a hospital bed. Patient is requiring full care and per palliative, they recommend  Marinol 2.5mg PO BID, no feeding tubes per patients wishes.     #HFrEF  -last echo in chart from September '22   -s/p AICD   -No evidence of fluid overload on exam   -Hold home lasix dose in the setting of ANA MARIA and poor oral intake    Anxiety/Depression   -Patient was kept on Remeron 7.5mg qHS   -Daughter reports that the patient has been withdrawn of recent, consider outpatient Psych follow up on discharge.    HLD/HTN Patients home meds were continued:  lipitor 20mg qHS home dose.   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 Per daughter at bedside, patient was dc'ed to NH after a hospitalization a few months ago, where she began to become progressively more withdrawn first refusing feeds now frequently refusing po fluids as well.  Of note she was diagnosed with a uti on 12/6 (ECOLI)  and per daughter is s/p course of po bactrim which was given to her by the NH doctor  (cannot confirm). Currently endorsing feelings of sadness after having had a traumatic experience at the nursing home several days ago. Endorses dysuria, denies hematuria, hesitancy or frequency. Denied nausea, fever or chills. Morgan was placed by nursing home staff for poor urine output.     In the ed vitals were stable, no sepsis on admission. Labs notable for hyperkalemia, +ua, ANA MARIA on CKD. (Cr 4.6 on admission, bilateral 3.0) Patient received 1 dose of Meropenem in the ed (documented cephalosporin allergy) and completed course of aztreonam. Urine growing > 100,00 E. Coli. Patient's Ana Maria resolved. PT evaluated patient and recommended PRETTY. Patient's daughter would like to take her home.       #ANA MARIA on CKD5   Patient creatinine stable at 2.0, was kept on IVF given the poor PO intake. The patient was kept on calcium acetate binders and  follows with outpatient nephrologist       #Suspected UTI  #Dysuria  -UA+ in ed, patient w/culture +ecoli 12/6 s/p course of bactrim prior to admission  and completed Aztreonam for now (PCN Allergy)     Course was complicated by failure to thrive:  Patient had low BMI during this admission and the daughter no longer wants hospice and requesting  home care service and a hospital bed. Patient is requiring full care and per palliative, they recommend  Marinol 2.5mg PO BID, no feeding tubes per patients wishes.     #HFrEF  -last echo in chart from September '22   -s/p AICD   -No evidence of fluid overload on exam   -Hold home lasix dose in the setting of ANA MARIA and poor oral intake    Anxiety/Depression   -Patient was kept on Remeron 7.5mg qHS   -Daughter reports that the patient has been withdrawn of recent, consider outpatient Psych follow up on discharge.    HLD/HTN Patients home meds were continued:  lipitor 20mg qHS home dose.   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 Per daughter at bedside, patient was dc'ed to NH after a hospitalization a few months ago, where she began to become progressively more withdrawn first refusing feeds now frequently refusing po fluids as well.  Of note she was diagnosed with a uti on 12/6 (ECOLI)  and per daughter is s/p course of po bactrim which was given to her by the NH doctor  (cannot confirm). Currently endorsing feelings of sadness after having had a traumatic experience at the nursing home several days ago. Endorses dysuria, denies hematuria, hesitancy or frequency. Denied nausea, fever or chills. Morgan was placed by nursing home staff for poor urine output.     In the ed vitals were stable, no sepsis on admission. Labs notable for hyperkalemia, +ua, ANA MARIA on CKD. (Cr 4.6 on admission, bilateral 3.0) Patient received 1 dose of Meropenem in the ed (documented cephalosporin allergy) and completed course of aztreonam. Urine growing > 100,00 E. Coli. Patient's Ana Maria resolved. PT evaluated patient and recommended PRETTY. Patient's daughter would like to take her home.       #ANA MARIA on CKD5   Patient creatinine stable at 2.0, was kept on IVF given the poor PO intake. The patient was kept on calcium acetate binders and  follows with outpatient nephrologist       #Suspected UTI  #Dysuria  -UA+ in ed, patient w/culture +ecoli 12/6 s/p course of bactrim prior to admission  and completed Aztreonam for now (PCN Allergy)     Course was complicated by failure to thrive:  Patient had low BMI during this admission and the daughter no longer wants hospice and requesting  home care service and a hospital bed. Patient is requiring full care and per palliative, they recommend  Marinol 2.5mg PO BID, no feeding tubes per patients wishes.     #HFrEF  -last echo in chart from September '22   -s/p AICD   -No evidence of fluid overload on exam   -Hold home lasix dose in the setting of ANA MARIA and poor oral intake    Anxiety/Depression   -Patient was kept on Remeron 7.5mg qHS   -Daughter reports that the patient has been withdrawn of recent, consider outpatient Psych follow up on discharge.    HLD/HTN Patients home meds were continued:  lipitor 20mg qHS home dose.   Continue Metoprolol    Hx of Provoked DVT/PE:     -on eliquis 2.5mg BID     Constipation:   -senna 2tabs qHS     Patient discharge with home care into daughter's care.

## 2022-12-21 NOTE — DISCHARGE NOTE PROVIDER - CARE PROVIDERS DIRECT ADDRESSES
,DirectAddress_Unknown,[email or web address]

## 2022-12-21 NOTE — PROGRESS NOTE ADULT - ASSESSMENT
87F presents to the ED from NH for elevated creatinine subsequently found to have suspected UTI in ED admitted to medicine for OMERO on CKD5.     #OMERO   -SCr 4.7 on admission   -baseline SCr noted to be 3.0  per [facility],   -daughter recalls recent Bactrim exposure 2 weeks prior.   -Has been refusing po fluids in NH  -Gentle IVF   -Renal bladder ultrasound   -Hold home lasix   -avoid nephrotoxic agents   -Keep carlos to monitor urine output   -f/u spep, upep, serum immunofixation  -f/u urine lytes and osmolality     #CKD 5  -patient follows with outpatient neophrologist   -continue calcium acetate binders     #Hyperkalemia   -K 5.5 on admission, now 5.2 s/p Lokelma   -Low K diet   -f/u am     #Suspected UTI  #Dysuria  -UA+ in ed, culture pending   -patient w/culture +ecoli 12/6 s/p course of bactrim prior to admission   -Continue Aztreonam for now (PCN Allergy)     #HFrEF  -last echo in chart from September '22   -s/p AICD   -No evidence of fluid overload on exam   -Hold home lasix dose in the setting of OMERO     #Anxiety/Depression   -Remeron 7.5mg qHS   -Daughter reports that the patient has been withdrawn of recent, consider outpatient Psych follow up on discharge.  -Patient endorses emotionally traumatic episode in NH. Please get  to investigate.     #HLD  -continue lipitor 20mg qHS home dose.     #HTN   -Continue Metoprolol    #Hx of Provoked DVT/PE   -on eliquis 2.5mg BID     #Constipation   -senna 2tabs qHS     Activity: IAT   Diet: Low K Low phos   DVT ppx: home eliquis   GI ppx: protonix daily     Dispo: d/c planning in am if no fever and renal function improving

## 2022-12-21 NOTE — DISCHARGE NOTE PROVIDER - NSDCCPCAREPLAN_GEN_ALL_CORE_FT
PRINCIPAL DISCHARGE DIAGNOSIS  Diagnosis: Acute on chronic kidney failure  Assessment and Plan of Treatment: You were noted to have a temporary insult to your kidney function either at the time that you arrived at the hospital or during your stay here. We have monitored your kidney function with blood work during your time here and you are at a level that no longer requires continued hospital level care, but we do recommend that you follow up to continually have your kidney function checked. You can follow up with your primary care doctor, or, if recommended in the discharge paperwork, you should follow up with a Kidney specialist called a Nephrologist.        SECONDARY DISCHARGE DIAGNOSES  Diagnosis: Hyperkalemia  Assessment and Plan of Treatment: Your potassium levels were elevated. We recommend that you follow a low potassium diet.    Diagnosis: Acute UTI  Assessment and Plan of Treatment: You had a urinary tract infection and it was treated with antibiotics. You do not need to take anymore antibiotics. Please return to the ED if you have fevers, chills, urinary pain or burning, abdominal pain.     PRINCIPAL DISCHARGE DIAGNOSIS  Diagnosis: Acute on chronic kidney failure  Assessment and Plan of Treatment: Chronic kidney disease (CKD) occurs when the kidneys are slowly and permanently damaged over a long period of time. The kidneys are a pair of organs that do many important jobs in the body, including:  •Removing waste and extra fluid from the blood to make urine.  •Making hormones that maintain the amount of fluid in tissues and blood vessels.  •Maintaining the right amount of fluids and chemicals in the body.  A small amount of kidney damage may not cause problems, but a large amount of damage may make it hard or impossible for the kidneys to work right. Steps must be taken to slow kidney damage or to stop it from getting worse. If steps are not taken, the kidneys may stop working permanently (end-stage renal disease, or ESRD). Most of the time, CKD does not go away, but it can often be controlled. People who have CKD are usually able to live full lives.  You were noted to have a temporary insult to your kidney function either at the time that you arrived at the hospital or during your stay here. We have monitored your kidney function with blood work during your time here and you are at a level that no longer requires continued hospital level care, but we do recommend that you follow up to continually have your kidney function checked. You can follow up with your primary care doctor, or, if recommended in the discharge paperwork, you should follow up with a Kidney specialist called a Nephrologist.        SECONDARY DISCHARGE DIAGNOSES  Diagnosis: Acute UTI  Assessment and Plan of Treatment: A urinary tract infection (UTI) is an infection of any part of the urinary tract. The urinary tract includes the kidneys, ureters, bladder, and urethra. These organs make, store, and get rid of urine in the body.  An upper UTI affects the ureters and kidneys. A lower UTI affects the bladder and urethra.  What are the causes?  Most urinary tract infections are caused by bacteria in your genital area around your urethra, where urine leaves your body. These bacteria grow and cause inflammation of your urinary tract.  What increases the risk?  You are more likely to develop this condition if:  •You have a urinary catheter that stays in place.  •You are not able to control when you urinate or have a bowel movement (incontinence).  •You are female and you:  •Use a spermicide or diaphragm for birth control.  •Have low estrogen levels.  •Are pregnant.  •You have certain genes that increase your risk.  •You are sexually active.  •You take antibiotic medicines.  •You have a condition that causes your flow of urine to slow down, such as:  You had a urinary tract infection and it was treated with antibiotics. You do not need to take anymore antibiotics. Please return to the ED if you have fevers, chills, urinary pain or burning, abdominal pain.

## 2022-12-22 DIAGNOSIS — F41.9 ANXIETY DISORDER, UNSPECIFIED: ICD-10-CM

## 2022-12-22 DIAGNOSIS — N39.0 URINARY TRACT INFECTION, SITE NOT SPECIFIED: ICD-10-CM

## 2022-12-22 DIAGNOSIS — R53.81 OTHER MALAISE: ICD-10-CM

## 2022-12-22 DIAGNOSIS — N17.9 ACUTE KIDNEY FAILURE, UNSPECIFIED: ICD-10-CM

## 2022-12-22 DIAGNOSIS — Z51.5 ENCOUNTER FOR PALLIATIVE CARE: ICD-10-CM

## 2022-12-22 LAB
-  AMIKACIN: SIGNIFICANT CHANGE UP
-  AMOXICILLIN/CLAVULANIC ACID: SIGNIFICANT CHANGE UP
-  AMPICILLIN/SULBACTAM: SIGNIFICANT CHANGE UP
-  AMPICILLIN: SIGNIFICANT CHANGE UP
-  AZTREONAM: SIGNIFICANT CHANGE UP
-  CEFAZOLIN: SIGNIFICANT CHANGE UP
-  CEFEPIME: SIGNIFICANT CHANGE UP
-  CEFOXITIN: SIGNIFICANT CHANGE UP
-  CEFTRIAXONE: SIGNIFICANT CHANGE UP
-  CEFUROXIME: SIGNIFICANT CHANGE UP
-  CIPROFLOXACIN: SIGNIFICANT CHANGE UP
-  ERTAPENEM: SIGNIFICANT CHANGE UP
-  GENTAMICIN: SIGNIFICANT CHANGE UP
-  IMIPENEM: SIGNIFICANT CHANGE UP
-  LEVOFLOXACIN: SIGNIFICANT CHANGE UP
-  MEROPENEM: SIGNIFICANT CHANGE UP
-  NITROFURANTOIN: SIGNIFICANT CHANGE UP
-  PIPERACILLIN/TAZOBACTAM: SIGNIFICANT CHANGE UP
-  TOBRAMYCIN: SIGNIFICANT CHANGE UP
-  TRIMETHOPRIM/SULFAMETHOXAZOLE: SIGNIFICANT CHANGE UP
ALBUMIN SERPL ELPH-MCNC: 3.4 G/DL — LOW (ref 3.5–5.2)
ALP SERPL-CCNC: 115 U/L — SIGNIFICANT CHANGE UP (ref 30–115)
ALT FLD-CCNC: 16 U/L — SIGNIFICANT CHANGE UP (ref 0–41)
ANION GAP SERPL CALC-SCNC: 16 MMOL/L — HIGH (ref 7–14)
AST SERPL-CCNC: 35 U/L — SIGNIFICANT CHANGE UP (ref 0–41)
BASOPHILS # BLD AUTO: 0.05 K/UL — SIGNIFICANT CHANGE UP (ref 0–0.2)
BASOPHILS NFR BLD AUTO: 0.6 % — SIGNIFICANT CHANGE UP (ref 0–1)
BILIRUB SERPL-MCNC: 0.4 MG/DL — SIGNIFICANT CHANGE UP (ref 0.2–1.2)
BUN SERPL-MCNC: 92 MG/DL — CRITICAL HIGH (ref 10–20)
CALCIUM SERPL-MCNC: 8.8 MG/DL — SIGNIFICANT CHANGE UP (ref 8.4–10.5)
CHLORIDE SERPL-SCNC: 105 MMOL/L — SIGNIFICANT CHANGE UP (ref 98–110)
CO2 SERPL-SCNC: 19 MMOL/L — SIGNIFICANT CHANGE UP (ref 17–32)
CREAT SERPL-MCNC: 3.7 MG/DL — HIGH (ref 0.7–1.5)
CULTURE RESULTS: SIGNIFICANT CHANGE UP
EGFR: 11 ML/MIN/1.73M2 — LOW
EOSINOPHIL # BLD AUTO: 0.55 K/UL — SIGNIFICANT CHANGE UP (ref 0–0.7)
EOSINOPHIL NFR BLD AUTO: 6.8 % — SIGNIFICANT CHANGE UP (ref 0–8)
GLUCOSE SERPL-MCNC: 73 MG/DL — SIGNIFICANT CHANGE UP (ref 70–99)
HCT VFR BLD CALC: 40.4 % — SIGNIFICANT CHANGE UP (ref 37–47)
HGB BLD-MCNC: 12.9 G/DL — SIGNIFICANT CHANGE UP (ref 12–16)
IMM GRANULOCYTES NFR BLD AUTO: 0.4 % — HIGH (ref 0.1–0.3)
LYMPHOCYTES # BLD AUTO: 1.61 K/UL — SIGNIFICANT CHANGE UP (ref 1.2–3.4)
LYMPHOCYTES # BLD AUTO: 20 % — LOW (ref 20.5–51.1)
MAGNESIUM SERPL-MCNC: 2.2 MG/DL — SIGNIFICANT CHANGE UP (ref 1.8–2.4)
MCHC RBC-ENTMCNC: 28.1 PG — SIGNIFICANT CHANGE UP (ref 27–31)
MCHC RBC-ENTMCNC: 31.9 G/DL — LOW (ref 32–37)
MCV RBC AUTO: 88 FL — SIGNIFICANT CHANGE UP (ref 81–99)
METHOD TYPE: SIGNIFICANT CHANGE UP
MONOCYTES # BLD AUTO: 0.89 K/UL — HIGH (ref 0.1–0.6)
MONOCYTES NFR BLD AUTO: 11.1 % — HIGH (ref 1.7–9.3)
NEUTROPHILS # BLD AUTO: 4.9 K/UL — SIGNIFICANT CHANGE UP (ref 1.4–6.5)
NEUTROPHILS NFR BLD AUTO: 61.1 % — SIGNIFICANT CHANGE UP (ref 42.2–75.2)
NRBC # BLD: 0 /100 WBCS — SIGNIFICANT CHANGE UP (ref 0–0)
ORGANISM # SPEC MICROSCOPIC CNT: SIGNIFICANT CHANGE UP
ORGANISM # SPEC MICROSCOPIC CNT: SIGNIFICANT CHANGE UP
PLATELET # BLD AUTO: 197 K/UL — SIGNIFICANT CHANGE UP (ref 130–400)
POTASSIUM SERPL-MCNC: 5.4 MMOL/L — HIGH (ref 3.5–5)
POTASSIUM SERPL-SCNC: 5.4 MMOL/L — HIGH (ref 3.5–5)
PROT SERPL-MCNC: 6.3 G/DL — SIGNIFICANT CHANGE UP (ref 6–8)
RBC # BLD: 4.59 M/UL — SIGNIFICANT CHANGE UP (ref 4.2–5.4)
RBC # FLD: 15.1 % — HIGH (ref 11.5–14.5)
SODIUM SERPL-SCNC: 140 MMOL/L — SIGNIFICANT CHANGE UP (ref 135–146)
SPECIMEN SOURCE: SIGNIFICANT CHANGE UP
WBC # BLD: 8.03 K/UL — SIGNIFICANT CHANGE UP (ref 4.8–10.8)
WBC # FLD AUTO: 8.03 K/UL — SIGNIFICANT CHANGE UP (ref 4.8–10.8)

## 2022-12-22 PROCEDURE — 99222 1ST HOSP IP/OBS MODERATE 55: CPT

## 2022-12-22 PROCEDURE — 73120 X-RAY EXAM OF HAND: CPT | Mod: 26,LT

## 2022-12-22 PROCEDURE — 99233 SBSQ HOSP IP/OBS HIGH 50: CPT

## 2022-12-22 RX ORDER — SODIUM CHLORIDE 9 MG/ML
1000 INJECTION, SOLUTION INTRAVENOUS
Refills: 0 | Status: DISCONTINUED | OUTPATIENT
Start: 2022-12-22 | End: 2022-12-28

## 2022-12-22 RX ORDER — SODIUM CHLORIDE 9 MG/ML
500 INJECTION, SOLUTION INTRAVENOUS ONCE
Refills: 0 | Status: COMPLETED | OUTPATIENT
Start: 2022-12-22 | End: 2022-12-22

## 2022-12-22 RX ORDER — DRONABINOL 2.5 MG
2.5 CAPSULE ORAL
Refills: 0 | Status: COMPLETED | OUTPATIENT
Start: 2022-12-22 | End: 2022-12-29

## 2022-12-22 RX ADMIN — SODIUM CHLORIDE 1000 MILLILITER(S): 9 INJECTION, SOLUTION INTRAVENOUS at 14:02

## 2022-12-22 RX ADMIN — Medication 25 MILLIGRAM(S): at 05:36

## 2022-12-22 RX ADMIN — LATANOPROST 1 DROP(S): 0.05 SOLUTION/ DROPS OPHTHALMIC; TOPICAL at 05:34

## 2022-12-22 RX ADMIN — APIXABAN 2.5 MILLIGRAM(S): 2.5 TABLET, FILM COATED ORAL at 05:35

## 2022-12-22 RX ADMIN — Medication 1334 MILLIGRAM(S): at 07:53

## 2022-12-22 RX ADMIN — PANTOPRAZOLE SODIUM 40 MILLIGRAM(S): 20 TABLET, DELAYED RELEASE ORAL at 07:54

## 2022-12-22 RX ADMIN — Medication 1 DROP(S): at 05:34

## 2022-12-22 RX ADMIN — Medication 100 MILLIGRAM(S): at 12:28

## 2022-12-22 RX ADMIN — MIRTAZAPINE 7.5 MILLIGRAM(S): 45 TABLET, ORALLY DISINTEGRATING ORAL at 21:36

## 2022-12-22 RX ADMIN — APIXABAN 2.5 MILLIGRAM(S): 2.5 TABLET, FILM COATED ORAL at 18:00

## 2022-12-22 RX ADMIN — SODIUM CHLORIDE 50 MILLILITER(S): 9 INJECTION, SOLUTION INTRAVENOUS at 16:14

## 2022-12-22 RX ADMIN — OXYCODONE HYDROCHLORIDE 5 MILLIGRAM(S): 5 TABLET ORAL at 18:00

## 2022-12-22 RX ADMIN — Medication 1334 MILLIGRAM(S): at 17:59

## 2022-12-22 RX ADMIN — Medication 2.5 MILLIGRAM(S): at 18:00

## 2022-12-22 RX ADMIN — Medication 1334 MILLIGRAM(S): at 12:27

## 2022-12-22 RX ADMIN — Medication 1 DROP(S): at 17:59

## 2022-12-22 RX ADMIN — LATANOPROST 1 DROP(S): 0.05 SOLUTION/ DROPS OPHTHALMIC; TOPICAL at 21:35

## 2022-12-22 RX ADMIN — ATORVASTATIN CALCIUM 20 MILLIGRAM(S): 80 TABLET, FILM COATED ORAL at 21:35

## 2022-12-22 RX ADMIN — SENNA PLUS 2 TABLET(S): 8.6 TABLET ORAL at 21:36

## 2022-12-22 NOTE — PROGRESS NOTE ADULT - ASSESSMENT
87F presents to the ED from NH for elevated creatinine subsequently found to have suspected UTI in ED admitted to medicine for OMERO on CKD5.     #Failure To Thrive   #Functional Quadraplegia / Bedbound   - Low BMI   - Refuses to eat and has been deconditioning since went to [facility] per Daughter and continues to deteriorate   - Daughter wants pt to return home NOT STR. She lives downstairs from her house  - Pt requires FULL CARE   - Palliative Care Consult for GOC Discussion and CODE STATUS as FULL CODE NOW   - f/u SLP     #OMERO on CKD5   -SCr 4.7 on admission   -baseline SCr noted to be 3.0  per [facility],   -daughter recalls recent Bactrim exposure 2 weeks prior.   -Has been refusing po fluids in NH  -Gentle IVF LR 50cc/hr (not eating)   -Renal bladder ultrasound reviewed   -Hold home lasix   -avoid nephrotoxic agents   -Keep carlos to monitor urine output   -f/u spep, upep, serum immunofixation  -f/u urine lytes and osmolality     #CKD 5  -patient follows with outpatient neophrologist   -continue calcium acetate binders     #Hyperkalemia   -K 5.5 on admission, now 5.2 s/p Lokelma   -Low K diet   -f/u am     #Suspected UTI  #Dysuria  -UA+ in ed, culture pending   -patient w/culture +ecoli 12/6 s/p course of bactrim prior to admission   -completed Aztreonam for now (PCN Allergy)     #HFrEF  -last echo in chart from September '22   -s/p AICD   -No evidence of fluid overload on exam   -Hold home lasix dose in the setting of OMERO     #Anxiety/Depression   -Remeron 7.5mg qHS   -Daughter reports that the patient has been withdrawn of recent, consider outpatient Psych follow up on discharge.  -Patient endorses emotionally traumatic episode in NH. Please get  to investigate.     #HLD  -continue lipitor 20mg qHS home dose.     #HTN   -Continue Metoprolol    #Hx of Provoked DVT/PE   -on eliquis 2.5mg BID     #Constipation   -senna 2tabs qHS     Activity: IAT   Diet: Low K Low phos   DVT ppx: home eliquis   GI ppx: protonix daily     Pending: clinical improvement / palliative evaluation and family discussion on GOC / Pt not eating may need NGT   Family: Daughter updated by Resident after rounds. Palliative Care Eval in progress   Dispo: Unknown possible HOME w/ Daughter / FULL ASSIST

## 2022-12-22 NOTE — CONSULT NOTE ADULT - CONSULT REASON
88yo very deconditioned, now functionally quadriplegic, refusing to eat or participate in PT, consult for help with GOC. Daughter would like to take her home but not a safe home discharge.

## 2022-12-22 NOTE — CONSULT NOTE ADULT - ASSESSMENT
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 Patient was being treated for UTI at NH. Patient has been having recent symptoms of depression as well. Palliative consulted for [facility].       MEDD (morphine equivalent daily dose):      See Recs below.    Please call [phone] with questions or concerns 24/7.   We will continue to follow.     Discussed with primary MD.   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 Patient was being treated for UTI at NH. Patient has been having recent symptoms of depression as well. Palliative consulted for [facility].     Reached out to patient's daughter Leona via phone twice. There was no answer and mailbox was full.  Will follow up.      MEDD (morphine equivalent daily dose): NA      See Recs below.    Please call [phone] with questions or concerns 24/7.   We will continue to follow.     Discussed with primary MD.

## 2022-12-22 NOTE — CONSULT NOTE ADULT - PROBLEM SELECTOR RECOMMENDATION 2
CKD, follows with outside. Per chart review, had been refusing PO fluids in NH  -f/u BMP  -f/u labs  -continue IVF

## 2022-12-22 NOTE — CONSULT NOTE ADULT - SUBJECTIVE AND OBJECTIVE BOX
CC:     HPI:  88 yo female pmhx HTN, provoked DVT/PE on eliquis, HFrEF(LVEF 35-40% in 9/22) s/p AICD, RA, CKD stage 5 sent from NH to ED yesterday for elevated SCr noted on routine labs- admitted for OMERO on CKD. Patient was being treated for UTI at NH. Patient has been having recent symptoms of depression as well. Palliative consulted for GOC.     PERTINENT PM/SXH:   Chronic kidney disease    Pacemaker    Hypertension    Gout    Diverticulitis    Atrial fibrillation    Diastolic heart failure    Rheumatoid arthritis    DVT, lower extremity      Artificial pacemaker    History of appendectomy    History of tonsillectomy    History of hysterectomy    H/O hernia repair      FAMILY HISTORY:  FH: arthritis  sister      ITEMS NOT CHECKED ARE NOT PRESENT    SOCIAL HISTORY:   Significant other/partner[ ]  Children[X 2 ]  Jainism/Spirituality:  Substance hx:  [ ]   Tobacco hx:  [ ]   Alcohol hx: [ ]   Living Situation: [X - with dtr ]Home  [ ]Long term care  [ X- STR ]Rehab [ ]Other  Home Services: [ X ] HHA [ ] Visting RN [ ] Hospice  Occupation:  Home Opioid hx:  [ ] Y [ ] N [ ] I-Stop Reference No:    ADVANCE DIRECTIVES:    [X ] Full Code [ ] DNR  MOLST  [ ]  Living Will  [ ]   DECISION MAKER(s):  [X] Health Care Proxy(s)  [ ] Surrogate(s)  [ ] Guardian           Name(s): Phone Number(s): Leona (primary)  and Drake  (children)     BASELINE (I)ADL(s) (prior to admission):  [place]: [ ]Total  [X ] Moderate [ ]Dependent  Palliative Performance Status Version 2:         %    http://npcrc.org/files/news/palliative_performance_scale_ppsv2.pdf    Allergies    cephalosporins (Angioedema)  Keflex (Swelling)    Intolerances    MEDICATIONS  (STANDING):  allopurinol 100 milliGRAM(s) Oral daily  amLODIPine   Tablet 5 milliGRAM(s) Oral at bedtime  apixaban 2.5 milliGRAM(s) Oral two times a day  atorvastatin 20 milliGRAM(s) Oral at bedtime  calcium acetate 1334 milliGRAM(s) Oral three times a day with meals  dronabinol 2.5 milliGRAM(s) Oral two times a day  isosorbide   mononitrate ER Tablet (IMDUR) 60 milliGRAM(s) Oral daily  latanoprost 0.005% Ophthalmic Solution 1 Drop(s) Both EYES <User Schedule>  metoprolol succinate ER 25 milliGRAM(s) Oral daily  mirtazapine 7.5 milliGRAM(s) Oral at bedtime  pantoprazole    Tablet 40 milliGRAM(s) Oral before breakfast  senna 2 Tablet(s) Oral at bedtime  timolol 0.5% Solution 1 Drop(s) Both EYES two times a day    MEDICATIONS  (PRN):  acetaminophen     Tablet .. 650 milliGRAM(s) Oral every 6 hours PRN Temp greater or equal to 38C (100.4F)  meclizine 12.5 milliGRAM(s) Oral three times a day PRN Dizziness  oxyCODONE    IR 5 milliGRAM(s) Oral every 6 hours PRN Severe Pain (7 - 10)    PRESENT SYMPTOMS: [ ]Unable to obtain due to poor mentation   Source if other than patient:  [ ]Family   [ ]Team     Pain: [ ]yes [ ]no  QOL impact -   Location -                    Aggravating factors -  Quality -  Radiation -  Timing-  Severity (0-10 scale):  Minimal acceptable level (0-10 scale):     CPOT:    https://www.[facility].org/getattachment/vec23w26-1m8t-3a8m-[email or web address]-6372h5782f9l/Critical-Care-Pain-Observation-Tool-(CPOT)      PAIN AD Score:     http://geriatrictoolkit.[facility]/cog/painad.pdf (press ctrl +  left click to view)      Dyspnea:                           [ ]Mild [ ]Moderate [ ]Severe [ ]None  Anxiety:                             [ ]Mild [ ]Moderate [ ]Severe [ ]None  Fatigue:                             [ ]Mild [ ]Moderate [ ]Severe [ ]None  Nausea:                             [ ]Mild [ ]Moderate [ ]Severe [ ]None  Loss of appetite:              [ ]Mild [ ]Moderate [ ]Severe [ ]None  Constipation:                    [ ]Mild [ ]Moderate [ ]Severe [ ]None    Other Symptoms:  [ ]All other review of systems negative     Palliative Performance Status Version 2:         %    http://[facility].org/files/news/palliative_performance_scale_ppsv2.pdf  PHYSICAL EXAM:  Vital Signs Last 24 Hrs  T(C): 36.3 (22 Dec 2022 04:53), Max: 37.8 (21 Dec 2022 12:10)  T(F): 97.3 (22 Dec 2022 04:53), Max: 100 (21 Dec 2022 12:10)  HR: 82 (22 Dec 2022 04:53) (79 - 82)  BP: 121/63 (22 Dec 2022 04:53) (121/63 - 136/69)  BP(mean): --  RR: 19 (22 Dec 2022 04:53) (18 - 19)    GENERAL:  [ ] No acute distress [ ]Lethargic  [ ]Unarousable  [ ]Verbal  [ ]Non-Verbal [ ]Cachexia    BEHAVIORAL/PSYCH:  [ ]Alert and Oriented x  [ ] Anxiety [ ] Delirium [ ] Agitation [ ] Calm   EYES: [ ] No scleral icterus [ ] Scleral icterus [ ] Closed  ENMT:  [ ]Dry mouth  [ ]No external oral lesions [ ] No external ear or nose lesions  CARDIOVASCULAR:  [ ]Regular [ ]Irregular [ ]Tachy [ ]Not Tachy  [ ]Jacinto [ ] Edema [ ] No edema  PULMONARY:  [ ]Tachypnea  [ ]Audible excessive secretions [ ] No labored breathing [ ] labored breathing  GASTROINTESTINAL: [ ]Soft  [ ]Distended  [ ]Not distended [ ]Non tender [ ]Tender  MUSCULOSKELETAL: [ ]No clubbing [ ] clubbing  [ ] No cyanosis [ ] cyanosis  NEUROLOGIC: [ ]No focal deficits  [ ]Follows commands  [ ]Does not follow commands  [ ]Cognitive impairment  [ ]Dysphagia  [ ]Dysarthria  [ ]Paresis   SKIN: [ ] Jaundiced [ ] Non-jaundiced [ ]Rash [ ]No Rash [ ] Warm [ ] Dry  MISC/LINES: [ ] ET tube [ ] Trach [ ]NGT/OGT [ ]PEG [ ]Morgan    LABS: reviewed by me                        12.9   8.03  )-----------( 197      ( 22 Dec 2022 04:30 )             40.4   12-21    139  |  103  |  90<HH>  ----------------------------<  82  5.0   |  22  |  3.7<H>    Ca    9.2      21 Dec 2022 07:39  Phos  3.2     12-21  Mg     2.1     12-21    TPro  6.2  /  Alb  3.4<L>  /  TBili  0.3  /  DBili  x   /  AST  34  /  ALT  15  /  AlkPhos  111  12-21        RADIOLOGY & ADDITIONAL STUDIES: reviewed by me    EKG: reviewed by me    < from: 12 Lead ECG (12.19.22 @ 21:37) >  Ventricular Rate 87 BPM    Atrial Rate 87 BPM    P-R Interval 166 ms    QRS Duration 138 ms    Q-T Interval 414 ms    QTC Calculation(Bazett) 498 ms    P Axis 19 degrees    R Axis -28 degrees    T Axis 162 degrees    Diagnosis Line Normal sinus rhythm  Left bundle branch block  Abnormal ECG    Confirmed by Marian Barillas MD ([id number]) on 12/20/2022 4:57:23 PM    < end of copied text >    < from: Xray Chest 1 View- PORTABLE-Routine (Xray Chest 1 View- PORTABLE-Routine .) (12.19.22 @ 22:58) >  Impression:    No radiographic evidence of acute cardiopulmonary disease.        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 Patient was being treated for UTI at NH. Patient has been having recent symptoms of depression as well. Palliative consulted for GOC.     PERTINENT PM/SXH:   Chronic kidney disease    Pacemaker    Hypertension    Gout    Diverticulitis    Atrial fibrillation    Diastolic heart failure    Rheumatoid arthritis    DVT, lower extremity      Artificial pacemaker    History of appendectomy    History of tonsillectomy    History of hysterectomy    H/O hernia repair      FAMILY HISTORY:  FH: arthritis sister    ITEMS NOT CHECKED ARE NOT PRESENT    SOCIAL HISTORY:   Significant other/partner[ ]  Children[X 2 ]  Hinduism/Spirituality:  Substance hx:  [ ]   Tobacco hx:  [ ]   Alcohol hx: [ ]   Living Situation: [X - with dtr ]Home  [ ]Long term care  [ X- STR ]Rehab [ ]Other  Home Services: [ X ] HHA [ ] Filipe RN [ ] Hospice  Occupation:  Home Opioid hx:  [ ] Y [ ] N [x ] I-Stop Reference No: Kurt Cheek | Reference #: [id number]    Others' Prescriptions  Patient Name: Nena MarrufoBirth Date: 1935  Address: [address] [place] [id number]: Female  Rx Written	Rx Dispensed	Drug	Quantity	Days Supply	Prescriber Name  12/13/2022	12/13/2022	dronabinol 2.5 mg capsule	10	10	Prainito, Fabio DO  Prescriber Niya # [id number]  Payment Method Insurance  Dispenser [employer]  11/25/2022	11/25/2022	oxycodone hcl (ir) 5 mg tablet	30	7	Prainito, Fabio DO  Prescriber Niya # [id number]  Payment Method Insurance  Dispenser [employer]      ADVANCE DIRECTIVES:    [X ] Full Code [ ] DNR  MOLST  [ ]  Living Will  [ ]   DECISION MAKER(s):  [X] Health Care Proxy(s)  [ ] Surrogate(s)  [ ] Guardian           Name(s): Phone Number(s): Leona (primary)  and Drake  (children)     BASELINE (I)ADL(s) (prior to admission):  [place]: [ ]Total  [X ] Moderate [ ]Dependent  Palliative Performance Status Version 2:         60%    http://[email or web address]/files/[email or web address]_performance_scale_[email or web address].pdf    Allergies    cephalosporins (Angioedema)  Keflex (Swelling)    Intolerances    MEDICATIONS  (STANDING):  allopurinol 100 milliGRAM(s) Oral daily  amLODIPine   Tablet 5 milliGRAM(s) Oral at bedtime  apixaban 2.5 milliGRAM(s) Oral two times a day  atorvastatin 20 milliGRAM(s) Oral at bedtime  calcium acetate 1334 milliGRAM(s) Oral three times a day with meals  dronabinol 2.5 milliGRAM(s) Oral two times a day  isosorbide   mononitrate ER Tablet (IMDUR) 60 milliGRAM(s) Oral daily  latanoprost 0.005% Ophthalmic Solution 1 Drop(s) Both EYES <User Schedule>  metoprolol succinate ER 25 milliGRAM(s) Oral daily  mirtazapine 7.5 milliGRAM(s) Oral at bedtime  pantoprazole    Tablet 40 milliGRAM(s) Oral before breakfast  senna 2 Tablet(s) Oral at bedtime  timolol 0.5% Solution 1 Drop(s) Both EYES two times a day    MEDICATIONS  (PRN):  acetaminophen     Tablet .. 650 milliGRAM(s) Oral every 6 hours PRN Temp greater or equal to 38C (100.4F)  meclizine 12.5 milliGRAM(s) Oral three times a day PRN Dizziness  oxyCODONE    IR 5 milliGRAM(s) Oral every 6 hours PRN Severe Pain (7 - 10)    PRESENT SYMPTOMS: [x ]Unable to obtain due to poor mentation   Source if other than patient:  [ ]Family   [ ]Team     Pain: [ ]yes [ ]no  QOL impact -   Location -                    Aggravating factors -  Quality -  Radiation -  Timing-  Severity (0-10 scale):  Minimal acceptable level (0-10 scale):     PAIN AD Score: 0      Dyspnea:                           [ ]Mild [ ]Moderate [ ]Severe [x ]None  Anxiety:                             [ ]Mild [ ]Moderate [ ]Severe [x ]None  Fatigue:                             [ ]Mild [ ]Moderate [ ]Severe [x ]None  Nausea:                             [ ]Mild [ ]Moderate [ ]Severe [x ]None  Loss of appetite:              [ ]Mild [ ]Moderate [ ]Severe [x ]None  Constipation:                    [ ]Mild [ ]Moderate [ ]Severe [x ]None    Other Symptoms:  [ ]All other review of systems negative     Palliative Performance Status Version 2:         30%    http://npcrc.org/files/news/palliative_performance_scale_ppsv2.pdf    PHYSICAL EXAM:  Vital Signs Last 24 Hrs  T(C): 36.3 (22 Dec 2022 04:53), Max: 37.8 (21 Dec 2022 12:10)  T(F): 97.3 (22 Dec 2022 04:53), Max: 100 (21 Dec 2022 12:10)  HR: 82 (22 Dec 2022 04:53) (79 - 82)  BP: 121/63 (22 Dec 2022 04:53) (121/63 - 136/69)  BP(mean): --  RR: 19 (22 Dec 2022 04:53) (18 - 19)    GENERAL:  [x ] No acute distress [x ]Lethargic  [ ]Unarousable  [ ]Verbal  [ ]Non-Verbal [ ]Cachexia    BEHAVIORAL/PSYCH:  [ ]Alert and Oriented x  [ ] Anxiety [ ] Delirium [ ] Agitation [x ] Calm   EYES: [x ] No scleral icterus [ ] Scleral icterus [ ] Closed  ENMT:  [ ]Dry mouth  [x ]No external oral lesions [ ] No external ear or nose lesions  CARDIOVASCULAR:  [ ]Regular [ ]Irregular [ ]Tachy [x ]Not Tachy  [ ]Jacinto [ ] Edema [ ] No edema  PULMONARY:  [ ]Tachypnea  [ ]Audible excessive secretions [x ] No labored breathing [ ] labored breathing  GASTROINTESTINAL: [ ]Soft  [ ]Distended  [ x]Not distended [ ]Non tender [ ]Tender  MUSCULOSKELETAL: [x ]No clubbing [ ] clubbing  [ ] No cyanosis [ ] cyanosis  NEUROLOGIC: [ ]No focal deficits  [ ]Follows commands  [ ]Does not follow commands  [ x]Cognitive impairment  [ ]Dysphagia  [ ]Dysarthria  [ ]Paresis   SKIN: [ ] Jaundiced [ x] Non-jaundiced [ ]Rash [ ]No Rash [ ] Warm [ ] Dry  MISC/LINES: [ ] ET tube [ ] Trach [ ]NGT/OGT [ ]PEG [ ]Morgan    LABS: reviewed by me                        12.9   8.03  )-----------( 197      ( 22 Dec 2022 04:30 )             40.4   12-21    139  |  103  |  90<HH>  ----------------------------<  82  5.0   |  22  |  3.7<H>    Ca    9.2      21 Dec 2022 07:39  Phos  3.2     12-21  Mg     2.1     12-21    TPro  6.2  /  Alb  3.4<L>  /  TBili  0.3  /  DBili  x   /  AST  34  /  ALT  15  /  AlkPhos  111  12-21        RADIOLOGY & ADDITIONAL STUDIES: reviewed by me    EKG: reviewed by me    < from: 12 Lead ECG (12.19.22 @ 21:37) >  Ventricular Rate 87 BPM    Atrial Rate 87 BPM    P-R Interval 166 ms    QRS Duration 138 ms    Q-T Interval 414 ms    QTC Calculation(Bazett) 498 ms    P Axis 19 degrees    R Axis -28 degrees    T Axis 162 degrees    Diagnosis Line Normal sinus rhythm  Left bundle branch block  Abnormal ECG    Confirmed by Marian Barillas MD ([id number]) on 12/20/2022 4:57:23 PM    < end of copied text >    < from: Xray Chest 1 View- PORTABLE-Routine (Xray Chest 1 View- PORTABLE-Routine .) (12.19.22 @ 22:58) >  Impression:    No radiographic evidence of acute cardiopulmonary disease.        --- End of Report ---    < end of copied text >      REFERRALS:   [ ]Chaplaincy  [ ]Hospice  [ ]Child Life  [ ]Social Work  [ ]Case management [ ]Holistic Therapy     Goals of Care Document:

## 2022-12-22 NOTE — CONSULT NOTE ADULT - PROBLEM SELECTOR RECOMMENDATION 9
Poor functional status. Unable to participate fully in PT.  -continue treatment per primary team/pulmonary  -PT as appropriate per  -will discuss GOC with family

## 2022-12-22 NOTE — PROGRESS NOTE ADULT - SUBJECTIVE AND OBJECTIVE BOX
Medical Student Note    Patient Information:  RACHEL SUMMERS / 85y / Female / MRN#:[id number]    Hospital Day: 3     HPI:  84 yo F with pmhx of CHF, s/p pacemaker, DVT (on Eloquis), RA, gout, HTN, CKD was sent in to the ED by PMD Dr. Celeste for evaluation of worsening dyspnea on exertion which was associated with Left calf pain for few days. She also complained of dysuria and left sided flank pain x 2 days which was localized, non radiating, moderate in intensity and worsened with movement. Dyspnea and lower extremity swelling is exacerbated by walking. She denied any hematuria, fever, chills, paresthesias, nausea, vomiting, headache, dizziness.    Vital Signs Last 24 Hrs  T(C): 35.8 (22 Dec 2022 12:12), Max: 36.3 (22 Dec 2022 04:53)  T(F): 96.5 (22 Dec 2022 12:12), Max: 97.3 (22 Dec 2022 04:53)  HR: 76 (22 Dec 2022 12:12) (76 - 82)  BP: 99/60 (22 Dec 2022 12:12) (99/60 - 134/72)  RR: 19 (22 Dec 2022 12:12) (18 - 19)  SpO2: 98% (22 Dec 2022 04:53) (98% - 98%)    Parameters below as of 22 Dec 2022 04:53  Patient On (Oxygen Delivery Method): room air      Patient was received hypertensive in ER. Blood work showed mild anaemia, hyperkalemia, elevated creatinine and reduced GFR (unchanged from last blood work). Troponin were negative, BNP was elevated >22k. UA and COVID-19 was negative. CT abdomen and pelvis and CT chest showed Bibasilar peripheral opacities likely on the basis of subsegmental atelectasis. Patient is being admitted for monitoring and r/o volume overload secondary to kidney dysfunction versus CHF.      Attd: pt has long standing RA and chr body aches, jacob of left arm and back; she has tried RA meds in past, but they do not agree w/ her body; pt does NOT like to take strong narcotics; she says lidoderm helps; she is agreeable to tylenol and a muscle relaxant; she also has gout; pain is worse w/ mvmt, so seems musculoskel; pt was c/o SOB, but does not seem too vol overload; she denies palpitations or CP; she is usually not on home O2; she lives in a house w/ her son and her dtr has a day care on the second floor; she does not want to go to SNF, but she would like home PT for her unsteady gait; she is Mormon; she can eat and drink; she is well oriented and knows her med hx fairly well     Interval History:  Patient seen and examined at bedside. No acute events overnight. She reported excruciating back pain last night but this morning she is feeling improvement. She has been off the O2 and is 98% on room air. She had a bowel movement last night. She feels that she might be ready to go home tomorrow.      Past Medical History:  DVT, lower extremity  Rheumatoid arthritis  Diastolic heart failure  Atrial fibrillation  Diverticulitis  Gout  Hypertension  Pacemaker  Chronic kidney disease    Past Surgical History:  History of hysterectomy  History of tonsillectomy  History of appendectomy  Artificial pacemaker    Allergies:  Keflex (Swelling)    Medications:  PRN:  aluminum hydroxide/magnesium hydroxide/simethicone Suspension 30 milliLiter(s) Oral every 4 hours PRN Dyspepsia  morphine   Solution 2 milliGRAM(s) Oral every 8 hours PRN Severe Pain (7 - 10)    Home:  apixaban 2.5 mg oral tablet: 1 tab(s) orally 2 times a day  atorvastatin 40 mg oral tablet: 1 tab(s) orally once a day  calcium acetate 667 mg oral tablet: 1 tab(s) orally once a day  folic acid 1 mg oral tablet: 1 tab(s) orally once a day  furosemide 20 mg oral tablet: 1 tab(s) orally Tuesday, Thursday, Saturday,   latanoprost 0.005% ophthalmic solution: 1 drop(s) to each affected eye once a day (in the evening)  Metoprolol Succinate ER 25 mg oral tablet, extended release: 1 tab(s) orally once a day  sertraline 50 mg oral tablet: 1 tab(s) orally once a day    Vitals:  T(C): 35.8 (22 Dec 2022 12:12), Max: 36.3 (22 Dec 2022 04:53)  T(F): 96.5 (22 Dec 2022 12:12), Max: 97.3 (22 Dec 2022 04:53)  HR: 76 (22 Dec 2022 12:12) (76 - 82)  BP: 99/60 (22 Dec 2022 12:12) (99/60 - 134/72)  RR: 19 (22 Dec 2022 12:12) (18 - 19)  SpO2: 98% (22 Dec 2022 04:53) (98% - 98%)    Parameters below as of 22 Dec 2022 04:53  Patient On (Oxygen Delivery Method): room air    Physical Exam:  General: lethargic today 1st day with her (before was more awake per team)   Head: Normocephalic atraumatic.  Neck: Supple  Heart: Regular rate and rhythm; S1, S2; No murmurs.  Lungs: Clear to auscultation bilaterally.  Abdomen: Soft, nontender, nondistended.  Extremities: No edema in upper or lower extremities.  Neuro: AAOx2, No focal deficits.    LABS:                         12.9   8.03  )-----------( 197      ( 22 Dec 2022 04:30 )             40.4     12    140  |  105  |  92  ----------------------------<  73  5.4  |  19  |  3.7    Ca    8.8      22 Dec 2022 04:30  Phos  3.2     12-  Mg     2.2         TPro  6.3  /  Alb  3.4<L>  /  TBili  0.4  /  DBili  x   /  AST  35  /  ALT  16  /  AlkPhos  115                            11.7   8.94  )-----------( 211      ( 21 Dec 2022 07:39 )             36.4     12-    139  |  103  |  90<HH>  ----------------------------<  82  5.0   |  22  |  3.7<H>    Ca    9.2      21 Dec 2022 07:39  Phos  3.2     12-  Mg     2.1         TPro  6.2  /  Alb  3.4<L>  /  TBili  0.3  /  DBili  x   /  AST  34  /  ALT  15  /  AlkPhos  111      Urinalysis Basic (12-15 @ 00:00)  Color: Colorless  Appearance: Clear  S.009  Ketone: Negative  Bili: Negative  Urobili: <2 mg/dL   Protein: 30 mg/dL  Nitrite: Negative   Leuk Esterase: Negative  RBC: 1  WBC: 0   Sq Epi:   Non Sq Epi: 0  Bacteria: Negative    Microbiology:  Culture - Urine (collected 12-15 @ 00:00)  Source: .Urine Clean Catch (Midstream)  Final Report ( @ 09:15):  <10,000 CFU/mL Normal Urogenital Rand    Radiology: reviewed     MEDICATIONS  (STANDING):  allopurinol 100 milliGRAM(s) Oral daily  amLODIPine   Tablet 5 milliGRAM(s) Oral at bedtime  apixaban 2.5 milliGRAM(s) Oral two times a day  atorvastatin 20 milliGRAM(s) Oral at bedtime  calcium acetate 1334 milliGRAM(s) Oral three times a day with meals  dronabinol 2.5 milliGRAM(s) Oral daily  isosorbide   mononitrate ER Tablet (IMDUR) 60 milliGRAM(s) Oral daily  latanoprost 0.005% Ophthalmic Solution 1 Drop(s) Both EYES <User Schedule>  metoprolol succinate ER 25 milliGRAM(s) Oral daily  mirtazapine 7.5 milliGRAM(s) Oral at bedtime  pantoprazole    Tablet 40 milliGRAM(s) Oral before breakfast  senna 2 Tablet(s) Oral at bedtime  timolol 0.5% Solution 1 Drop(s) Both EYES two times a day    MEDICATIONS  (PRN):  acetaminophen     Tablet .. 650 milliGRAM(s) Oral every 6 hours PRN Temp greater or equal to 38C (100.4F)  meclizine 12.5 milliGRAM(s) Oral three times a day PRN Dizziness  oxyCODONE    IR 5 milliGRAM(s) Oral every 6 hours PRN Severe Pain (7 - 10)

## 2022-12-23 DIAGNOSIS — R63.8 OTHER SYMPTOMS AND SIGNS CONCERNING FOOD AND FLUID INTAKE: ICD-10-CM

## 2022-12-23 LAB
ALBUMIN SERPL ELPH-MCNC: 2.8 G/DL — LOW (ref 3.5–5.2)
ALP SERPL-CCNC: 100 U/L — SIGNIFICANT CHANGE UP (ref 30–115)
ALT FLD-CCNC: 15 U/L — SIGNIFICANT CHANGE UP (ref 0–41)
ANION GAP SERPL CALC-SCNC: 12 MMOL/L — SIGNIFICANT CHANGE UP (ref 7–14)
AST SERPL-CCNC: 31 U/L — SIGNIFICANT CHANGE UP (ref 0–41)
BASOPHILS # BLD AUTO: 0.05 K/UL — SIGNIFICANT CHANGE UP (ref 0–0.2)
BASOPHILS NFR BLD AUTO: 0.6 % — SIGNIFICANT CHANGE UP (ref 0–1)
BILIRUB SERPL-MCNC: 0.2 MG/DL — SIGNIFICANT CHANGE UP (ref 0.2–1.2)
BUN SERPL-MCNC: 77 MG/DL — CRITICAL HIGH (ref 10–20)
CALCIUM SERPL-MCNC: 9 MG/DL — SIGNIFICANT CHANGE UP (ref 8.4–10.5)
CHLORIDE SERPL-SCNC: 102 MMOL/L — SIGNIFICANT CHANGE UP (ref 98–110)
CO2 SERPL-SCNC: 24 MMOL/L — SIGNIFICANT CHANGE UP (ref 17–32)
CREAT SERPL-MCNC: 3 MG/DL — HIGH (ref 0.7–1.5)
EGFR: 15 ML/MIN/1.73M2 — LOW
EOSINOPHIL # BLD AUTO: 0.77 K/UL — HIGH (ref 0–0.7)
EOSINOPHIL NFR BLD AUTO: 9.6 % — HIGH (ref 0–8)
GLUCOSE SERPL-MCNC: 131 MG/DL — HIGH (ref 70–99)
HCT VFR BLD CALC: 37.3 % — SIGNIFICANT CHANGE UP (ref 37–47)
HGB BLD-MCNC: 11.5 G/DL — LOW (ref 12–16)
IMM GRANULOCYTES NFR BLD AUTO: 0.5 % — HIGH (ref 0.1–0.3)
LYMPHOCYTES # BLD AUTO: 1.31 K/UL — SIGNIFICANT CHANGE UP (ref 1.2–3.4)
LYMPHOCYTES # BLD AUTO: 16.3 % — LOW (ref 20.5–51.1)
MAGNESIUM SERPL-MCNC: 1.8 MG/DL — SIGNIFICANT CHANGE UP (ref 1.8–2.4)
MCHC RBC-ENTMCNC: 27.7 PG — SIGNIFICANT CHANGE UP (ref 27–31)
MCHC RBC-ENTMCNC: 30.8 G/DL — LOW (ref 32–37)
MCV RBC AUTO: 89.9 FL — SIGNIFICANT CHANGE UP (ref 81–99)
MONOCYTES # BLD AUTO: 1.01 K/UL — HIGH (ref 0.1–0.6)
MONOCYTES NFR BLD AUTO: 12.5 % — HIGH (ref 1.7–9.3)
NEUTROPHILS # BLD AUTO: 4.88 K/UL — SIGNIFICANT CHANGE UP (ref 1.4–6.5)
NEUTROPHILS NFR BLD AUTO: 60.5 % — SIGNIFICANT CHANGE UP (ref 42.2–75.2)
NRBC # BLD: 0 /100 WBCS — SIGNIFICANT CHANGE UP (ref 0–0)
PLATELET # BLD AUTO: 195 K/UL — SIGNIFICANT CHANGE UP (ref 130–400)
POTASSIUM SERPL-MCNC: 4.6 MMOL/L — SIGNIFICANT CHANGE UP (ref 3.5–5)
POTASSIUM SERPL-SCNC: 4.6 MMOL/L — SIGNIFICANT CHANGE UP (ref 3.5–5)
PROT SERPL-MCNC: 5.7 G/DL — LOW (ref 6–8)
RBC # BLD: 4.15 M/UL — LOW (ref 4.2–5.4)
RBC # FLD: 14.8 % — HIGH (ref 11.5–14.5)
SODIUM SERPL-SCNC: 138 MMOL/L — SIGNIFICANT CHANGE UP (ref 135–146)
WBC # BLD: 8.06 K/UL — SIGNIFICANT CHANGE UP (ref 4.8–10.8)
WBC # FLD AUTO: 8.06 K/UL — SIGNIFICANT CHANGE UP (ref 4.8–10.8)

## 2022-12-23 PROCEDURE — 99497 ADVNCD CARE PLAN 30 MIN: CPT

## 2022-12-23 PROCEDURE — 99233 SBSQ HOSP IP/OBS HIGH 50: CPT

## 2022-12-23 PROCEDURE — 99498 ADVNCD CARE PLAN ADDL 30 MIN: CPT

## 2022-12-23 RX ADMIN — Medication 25 MILLIGRAM(S): at 05:04

## 2022-12-23 RX ADMIN — Medication 2.5 MILLIGRAM(S): at 17:12

## 2022-12-23 RX ADMIN — LATANOPROST 1 DROP(S): 0.05 SOLUTION/ DROPS OPHTHALMIC; TOPICAL at 05:04

## 2022-12-23 RX ADMIN — Medication 1334 MILLIGRAM(S): at 17:12

## 2022-12-23 RX ADMIN — ISOSORBIDE MONONITRATE 60 MILLIGRAM(S): 60 TABLET, EXTENDED RELEASE ORAL at 11:47

## 2022-12-23 RX ADMIN — ATORVASTATIN CALCIUM 20 MILLIGRAM(S): 80 TABLET, FILM COATED ORAL at 21:20

## 2022-12-23 RX ADMIN — PANTOPRAZOLE SODIUM 40 MILLIGRAM(S): 20 TABLET, DELAYED RELEASE ORAL at 05:04

## 2022-12-23 RX ADMIN — Medication 1 DROP(S): at 05:04

## 2022-12-23 RX ADMIN — APIXABAN 2.5 MILLIGRAM(S): 2.5 TABLET, FILM COATED ORAL at 05:04

## 2022-12-23 RX ADMIN — MIRTAZAPINE 7.5 MILLIGRAM(S): 45 TABLET, ORALLY DISINTEGRATING ORAL at 21:21

## 2022-12-23 RX ADMIN — SENNA PLUS 2 TABLET(S): 8.6 TABLET ORAL at 21:19

## 2022-12-23 RX ADMIN — Medication 1 DROP(S): at 17:12

## 2022-12-23 RX ADMIN — LATANOPROST 1 DROP(S): 0.05 SOLUTION/ DROPS OPHTHALMIC; TOPICAL at 21:21

## 2022-12-23 RX ADMIN — Medication 40 MILLIGRAM(S): at 09:01

## 2022-12-23 RX ADMIN — APIXABAN 2.5 MILLIGRAM(S): 2.5 TABLET, FILM COATED ORAL at 17:11

## 2022-12-23 RX ADMIN — Medication 2.5 MILLIGRAM(S): at 05:04

## 2022-12-23 RX ADMIN — Medication 100 MILLIGRAM(S): at 11:46

## 2022-12-23 RX ADMIN — Medication 1334 MILLIGRAM(S): at 11:47

## 2022-12-23 RX ADMIN — Medication 1334 MILLIGRAM(S): at 08:06

## 2022-12-23 NOTE — PROGRESS NOTE ADULT - SUBJECTIVE AND OBJECTIVE BOX
Medical Student Note    Patient Information:  RACHEL SUMMERS / 85y / Female / MRN#:[id number]    Hospital Day: 3     HPI:  86 yo F with pmhx of CHF, s/p pacemaker, DVT (on Eloquis), RA, gout, HTN, CKD was sent in to the ED by PMD Dr. Celeste for evaluation of worsening dyspnea on exertion which was associated with Left calf pain for few days. She also complained of dysuria and left sided flank pain x 2 days which was localized, non radiating, moderate in intensity and worsened with movement. Dyspnea and lower extremity swelling is exacerbated by walking. She denied any hematuria, fever, chills, paresthesias, nausea, vomiting, headache, dizziness.    Vital Signs Last 24 Hrs  T(C): 35.6 (23 Dec 2022 05:25), Max: 36.4 (22 Dec 2022 20:00)  T(F): 96.1 (23 Dec 2022 05:25), Max: 97.5 (22 Dec 2022 20:00)  HR: 82 (23 Dec 2022 05:25) (79 - 82)  BP: 144/76 (23 Dec 2022 05:25) (111/61 - 144/76)  RR: 19 (23 Dec 2022 05:25) (19 - 20)  SpO2: 98% (23 Dec 2022 05:25) (98% - 98%)    Parameters below as of 23 Dec 2022 05:25  Patient On (Oxygen Delivery Method): room air    Patient was received hypertensive in ER. Blood work showed mild anaemia, hyperkalemia, elevated creatinine and reduced GFR (unchanged from last blood work). Troponin were negative, BNP was elevated >22k. UA and COVID-19 was negative. CT abdomen and pelvis and CT chest showed Bibasilar peripheral opacities likely on the basis of subsegmental atelectasis. Patient is being admitted for monitoring and r/o volume overload secondary to kidney dysfunction versus CHF.      Attd: pt has long standing RA and chr body aches, jacob of left arm and back; she has tried RA meds in past, but they do not agree w/ her body; pt does NOT like to take strong narcotics; she says lidoderm helps; she is agreeable to tylenol and a muscle relaxant; she also has gout; pain is worse w/ mvmt, so seems musculoskel; pt was c/o SOB, but does not seem too vol overload; she denies palpitations or CP; she is usually not on home O2; she lives in a house w/ her son and her dtr has a day care on the second floor; she does not want to go to SNF, but she would like home PT for her unsteady gait; she is Baptism; she can eat and drink; she is well oriented and knows her med hx fairly well     Interval History:  Patient seen and examined at bedside. No acute events overnight. She reported excruciating back pain last night but this morning she is feeling improvement. She has been off the O2 and is 98% on room air. She had a bowel movement last night. She feels that she might be ready to go home tomorrow.      Past Medical History:  DVT, lower extremity  Rheumatoid arthritis  Diastolic heart failure  Atrial fibrillation  Diverticulitis  Gout  Hypertension  Pacemaker  Chronic kidney disease    Past Surgical History:  History of hysterectomy  History of tonsillectomy  History of appendectomy  Artificial pacemaker    Allergies:  Keflex (Swelling)    Medications:  PRN:  aluminum hydroxide/magnesium hydroxide/simethicone Suspension 30 milliLiter(s) Oral every 4 hours PRN Dyspepsia  morphine   Solution 2 milliGRAM(s) Oral every 8 hours PRN Severe Pain (7 - 10)    Home:  apixaban 2.5 mg oral tablet: 1 tab(s) orally 2 times a day  atorvastatin 40 mg oral tablet: 1 tab(s) orally once a day  calcium acetate 667 mg oral tablet: 1 tab(s) orally once a day  folic acid 1 mg oral tablet: 1 tab(s) orally once a day  furosemide 20 mg oral tablet: 1 tab(s) orally Tuesday, Thursday, Saturday,   latanoprost 0.005% ophthalmic solution: 1 drop(s) to each affected eye once a day (in the evening)  Metoprolol Succinate ER 25 mg oral tablet, extended release: 1 tab(s) orally once a day  sertraline 50 mg oral tablet: 1 tab(s) orally once a day    Vitals:  T(C): 35.8 (22 Dec 2022 12:12), Max: 36.3 (22 Dec 2022 04:53)  T(F): 96.5 (22 Dec 2022 12:12), Max: 97.3 (22 Dec 2022 04:53)  HR: 76 (22 Dec 2022 12:12) (76 - 82)  BP: 99/60 (22 Dec 2022 12:12) (99/60 - 134/72)  RR: 19 (22 Dec 2022 12:12) (18 - 19)  SpO2: 98% (22 Dec 2022 04:53) (98% - 98%)    Parameters below as of 22 Dec 2022 04:53  Patient On (Oxygen Delivery Method): room air    Physical Exam:  General: more awake today / sitting in bed no distress   Head: Normocephalic atraumatic.  Neck: Supple  Heart: Regular rate and rhythm; S1, S2; No murmurs.  Lungs: Clear to auscultation bilaterally.  Abdomen: Soft, nontender, nondistended.  Extremities: No edema in upper or lower extremities.  Neuro: AAOx2, No focal deficits.    LABS:                           11.5   8.06  )-----------( 195      ( 23 Dec 2022 07:45 )             37.3         138  |  102  |  77  -----------------------<  131  4.6   |  24  |  3.0    Ca    9.0      23 Dec 2022 07:45    Mg     1.8         TPro  5.7<L>  /  Alb  2.8<L>  /  TBili  0.2  /  DBili  x   /  AST  31  /  ALT  15  /  AlkPhos  100                            12.9   8.03  )-----------( 197      ( 22 Dec 2022 04:30 )             40.4     12    140  |  105  |  92  ----------------------------<  73  5.4  |  19  |  3.7    Ca    8.8      22 Dec 2022 04:30  Phos  3.2     12  Mg     2.2     12    TPro  6.3  /  Alb  3.4<L>  /  TBili  0.4  /  DBili  x   /  AST  35  /  ALT  16  /  AlkPhos  115  12                          11.7   8.94  )-----------( 211      ( 21 Dec 2022 07:39 )             36.4     12-    139  |  103  |  90<HH>  ----------------------------<  82  5.0   |  22  |  3.7<H>    Ca    9.2      21 Dec 2022 07:39  Phos  3.2       Mg     2.1         TPro  6.2  /  Alb  3.4<L>  /  TBili  0.3  /  DBili  x   /  AST  34  /  ALT  15  /  AlkPhos  111      Urinalysis Basic (12-15 @ 00:00)  Color: Colorless  Appearance: Clear  S.009  Ketone: Negative  Bili: Negative  Urobili: <2 mg/dL   Protein: 30 mg/dL  Nitrite: Negative   Leuk Esterase: Negative  RBC: 1  WBC: 0   Sq Epi:   Non Sq Epi: 0  Bacteria: Negative    Microbiology:  Culture - Urine (collected 12-15 @ 00:00)  Source: .Urine Clean Catch (Midstream)  Final Report ( @ 09:15):  <10,000 CFU/mL Normal Urogenital Rand    Radiology: reviewed     MEDICATIONS  (STANDING):  allopurinol 100 milliGRAM(s) Oral daily  amLODIPine   Tablet 5 milliGRAM(s) Oral at bedtime  apixaban 2.5 milliGRAM(s) Oral two times a day  atorvastatin 20 milliGRAM(s) Oral at bedtime  calcium acetate 1334 milliGRAM(s) Oral three times a day with meals  dronabinol 2.5 milliGRAM(s) Oral daily  isosorbide   mononitrate ER Tablet (IMDUR) 60 milliGRAM(s) Oral daily  latanoprost 0.005% Ophthalmic Solution 1 Drop(s) Both EYES <User Schedule>  metoprolol succinate ER 25 milliGRAM(s) Oral daily  mirtazapine 7.5 milliGRAM(s) Oral at bedtime  pantoprazole    Tablet 40 milliGRAM(s) Oral before breakfast  senna 2 Tablet(s) Oral at bedtime  timolol 0.5% Solution 1 Drop(s) Both EYES two times a day    MEDICATIONS  (PRN):  acetaminophen     Tablet .. 650 milliGRAM(s) Oral every 6 hours PRN Temp greater or equal to 38C (100.4F)  meclizine 12.5 milliGRAM(s) Oral three times a day PRN Dizziness  oxyCODONE    IR 5 milliGRAM(s) Oral every 6 hours PRN Severe Pain (7 - 10)

## 2022-12-23 NOTE — PROGRESS NOTE ADULT - ASSESSMENT
87F presents to the ED from NH for elevated creatinine subsequently found to have suspected UTI in ED admitted to medicine for OMERO on CKD5.     #Failure To Thrive   #Functional Quadraplegia / Bedbound   - Low BMI   - Refuses to eat and has been deconditioning since went to STR per Daughter and continues to deteriorate   - Daughter wants pt to return home NOT STR. She lives downstairs from her house  - Pt requires FULL CARE   - Palliative Care Consult for GOC Discussion and CODE STATUS as FULL CODE.   - f/u SLP recommending; Puree or Minced Diet w/ thin Liquids     #OMERO on CKD5   -SCr 4.7 on admission   -baseline SCr noted to be 3.0  per [facility] SERVIN,   -daughter recalls recent Bactrim exposure 2 weeks prior.   -Has been refusing po fluids in NH  -Gentle IVF LR 50cc/hr (not eating)   -Renal bladder ultrasound reviewed   -Hold home lasix   -avoid nephrotoxic agents   -Keep carlos to monitor urine output   -f/u spep, upep, serum immunofixation  -f/u nephrology in clinic     #CKD 5  -patient follows with outpatient neophrologist   -continue calcium acetate binders     #Hyperkalemia   -K 5.5 on admission, now 5.2 s/p Lokelma   -Low K diet   -f/u am     #Suspected UTI  #Dysuria  -UA+ in ed, culture pending   -patient w/culture +ecoli 12/6 s/p course of bactrim prior to admission   -completed Aztreonam for now (PCN Allergy)     #HFrEF  -last echo in chart from September '22   -s/p AICD   -No evidence of fluid overload on exam   -Hold home lasix dose in the setting of OMERO and poor oral intake    #Anxiety/Depression   -Remeron 7.5mg qHS   -Daughter reports that the patient has been withdrawn of recent, consider outpatient Psych follow up on discharge.  -Patient endorses emotionally traumatic episode in NH. Please get  to investigate.     #HLD  -continue lipitor 20mg qHS home dose.     #HTN   -Continue Metoprolol    #Hx of Provoked DVT/PE   -on eliquis 2.5mg BID     #Constipation   -senna 2tabs qHS     Activity: IAT   Diet: Low K Low phos   DVT ppx: home eliquis   GI ppx: protonix daily     Pending: clinical improvement / palliative evaluation and family discussion on GOC / Pt not eating may need NGT   Family: Daughter updated by Resident after rounds. Palliative Care Eval in progress   Dispo: Unknown possible Daughter wants pt to go HOME and pt is FULL ASSIST w/ Functional Quadraplegia-FTT - may benefit from hospice

## 2022-12-23 NOTE — SWALLOW BEDSIDE ASSESSMENT ADULT - SLP PERTINENT HISTORY OF CURRENT PROBLEM
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 Per daughter at bedside, patient was dc'ed to NH after a hospitalization a few months ago, where she began to become progressively more withdrawn first refusing feeds now frequently refusing po fluids aswell. Of note she was diagnosed with a uti on 12/6 (ECOLI)  and per daughter is s/p course of po bactrim which was given to her by the NH doctor  (cannot confirm).

## 2022-12-23 NOTE — PROGRESS NOTE ADULT - SUBJECTIVE AND OBJECTIVE BOX
HPI:    86 yo female pmhx HTN, provoked DVT/PE on eliquis, HFrEF(LVEF 35-40% in 9/22) s/p AICD, RA, CKD stage 5 sent from NH to ED yesterday for elevated SCr noted on routine labs- admitted for OMERO on CKD. Patient was being treated for UTI at NH. Patient has been having recent symptoms of depression as well. Palliative consulted for GOC.     INTERVAL EVENTS:    ADVANCE DIRECTIVES:    DNR  MOLST  [ ]  Living Will  [ ]   DECISION MAKER(s):  [ ] Health Care Proxy(s)  [ ] Surrogate(s)  [ ] Guardian           Name(s): Phone Number(s):    BASELINE (I)ADL(s) (prior to admission):  [place]: [ ]Total  [ ] Moderate [ ]Dependent  Palliative Performance Status Version 2:         %    http://npcrc.org/files/news/palliative_performance_scale_ppsv2.pdf    Allergies    cephalosporins (Angioedema)  Keflex (Swelling)    Intolerances    MEDICATIONS  (STANDING):  allopurinol 100 milliGRAM(s) Oral daily  apixaban 2.5 milliGRAM(s) Oral two times a day  atorvastatin 20 milliGRAM(s) Oral at bedtime  calcium acetate 1334 milliGRAM(s) Oral three times a day with meals  dronabinol 2.5 milliGRAM(s) Oral two times a day  isosorbide   mononitrate ER Tablet (IMDUR) 60 milliGRAM(s) Oral daily  lactated ringers. 1000 milliLiter(s) (50 mL/Hr) IV Continuous <Continuous>  latanoprost 0.005% Ophthalmic Solution 1 Drop(s) Both EYES <User Schedule>  metoprolol succinate ER 25 milliGRAM(s) Oral daily  mirtazapine 7.5 milliGRAM(s) Oral at bedtime  pantoprazole    Tablet 40 milliGRAM(s) Oral before breakfast  senna 2 Tablet(s) Oral at bedtime  timolol 0.5% Solution 1 Drop(s) Both EYES two times a day    MEDICATIONS  (PRN):  acetaminophen     Tablet .. 650 milliGRAM(s) Oral every 6 hours PRN Temp greater or equal to 38C (100.4F)  meclizine 12.5 milliGRAM(s) Oral three times a day PRN Dizziness  oxyCODONE    IR 5 milliGRAM(s) Oral every 6 hours PRN Severe Pain (7 - 10)    PRESENT SYMPTOMS: [ ]Unable to obtain due to poor mentation   Source if other than patient:  [ ]Family   [ ]Team     Pain: [ ]yes [ ]no  QOL impact -   Location -                    Aggravating factors -  Quality -  Radiation -  Timing-  Severity (0-10 scale):  Minimal acceptable level (0-10 scale):     CPOT:    https://www.[email or web address]/getattachment/[email or web address]-[email or web address]-[email or web address]/Critical-Care-Pain-Observation-Tool-(CPOT)      PAIN AD Score:     http://geriatrictoolkit.[facility]/cog/painad.pdf (press ctrl +  left click to view)    Dyspnea:                           [ ]Mild [ ]Moderate [ ]Severe  Anxiety:                             [ ]Mild [ ]Moderate [ ]Severe  Fatigue:                             [ ]Mild [ ]Moderate [ ]Severe  Nausea:                             [ ]Mild [ ]Moderate [ ]Severe  Loss of appetite:              [ ]Mild [ ]Moderate [ ]Severe  Constipation:                    [ ]Mild [ ]Moderate [ ]Severe    Other Symptoms:  [ ]All other review of systems negative     Palliative Performance Status Version 2:         %    http://npcrc.org/files/news/palliative_performance_scale_ppsv2.pdf  PHYSICAL EXAM:  Vital Signs Last 24 Hrs  T(C): 35.6 (23 Dec 2022 05:25), Max: 36.4 (22 Dec 2022 20:00)  T(F): 96.1 (23 Dec 2022 05:25), Max: 97.5 (22 Dec 2022 20:00)  HR: 82 (23 Dec 2022 05:25) (79 - 82)  BP: 144/76 (23 Dec 2022 05:25) (111/61 - 144/76)  BP(mean): --  RR: 19 (23 Dec 2022 05:25) (19 - 20)  SpO2: 98% (23 Dec 2022 05:25) (98% - 98%)    Parameters below as of 23 Dec 2022 05:25  Patient On (Oxygen Delivery Method): room air     I&O's Summary    GENERAL:  [ ]Alert  [ ]Oriented x   [ ]Lethargic  [ ]Cachexia  [ ]Unarousable  [ ]Verbal  [ ]Non-Verbal  Behavioral:   [ ] Anxiety  [ ] Delirium [ ] Agitation [ ] Other  HEENT:  [ ]Normal   [ ]Dry mouth   [ ]ET Tube/Trach  [ ]Oral lesions  PULMONARY:   [ ]Clear [ ]Tachypnea  [ ]Audible excessive secretions   [ ]Rhonchi        [ ]Right [ ]Left [ ]Bilateral  [ ]Crackles        [ ]Right [ ]Left [ ]Bilateral  [ ]Wheezing     [ ]Right [ ]Left [ ]Bilateral  [ ]Diminished breath sounds [ ]right [ ]left [ ]bilateral  CARDIOVASCULAR:    [ ]Regular [ ]Irregular [ ]Tachy  [ ]Jacinto [ ]Murmur [ ]Other  GASTROINTESTINAL:  [ ]Soft  [ ]Distended   [ ]+BS  [ ]Non tender [ ]Tender  [ ]PEG [ ]OGT/ NGT  Last BM:   GENITOURINARY:  [ ]Normal [ ] Incontinent   [ ]Oliguria/Anuria   [ ]Morgan  MUSCULOSKELETAL:   [ ]Normal   [ ]Weakness  [ ]Bed/Wheelchair bound [ ]Edema  NEUROLOGIC:   [ ]No focal deficits  [ ]Cognitive impairment  [ ]Dysphagia [ ]Dysarthria [ ]Paresis [ ]Other   SKIN:   [ ]Normal    [ ]Rash  [ ]Pressure ulcer(s)       Present on admission [ ]y [ ]n    CRITICAL CARE:  [ ] Shock Present  [ ]Septic [ ]Cardiogenic [ ]Neurologic [ ]Hypovolemic  [ ]  Vasopressors [ ]  Inotropes   [ ]Respiratory failure present [ ]Mechanical ventilation [ ]Non-invasive ventilatory support [ ]High flow  [ ]Acute  [ ]Chronic [ ]Hypoxic  [ ]Hypercarbic [ ]Other  [ ]Other organ failure     LABS:                        11.5   8.06  )-----------( 195      ( 23 Dec 2022 07:45 )             37.3   12-23    138  |  102  |  77<HH>  ----------------------------<  131<H>  4.6   |  24  |  3.0<H>    Ca    9.0      23 Dec 2022 07:45  Mg     1.8     12-23    TPro  5.7<L>  /  Alb  2.8<L>  /  TBili  0.2  /  DBili  x   /  AST  31  /  ALT  15  /  AlkPhos  100  12-23        RADIOLOGY & ADDITIONAL STUDIES:    PROTEIN CALORIE MALNUTRITION PRESENT: [ ]mild [ ]moderate [ ]severe [ ]underweight [ ]morbid obesity  https://www.andeal.org/vault/2440/web/files/ONC/Table_Clinical%20Characteristics%20to%20Document%20Malnutrition-White%20JV%20et%20al%202012.pdf    Height (cm): 160 (09-14-22 @ 06:44), 160 (07-17-22 @ 10:50), 160 (07-02-22 @ 12:50)  Weight (kg): 54.1 (12-20-22 @ 10:43), 54.6 (09-14-22 @ 06:44), 59.2 (07-18-22 @ 01:00)  BMI (kg/m2): 21.1 (12-20-22 @ 10:43), 21.3 (09-14-22 @ 06:44), 23.1 (07-18-22 @ 01:00)    [ ]PPSV2 < or = to 30% [ ]significant weight loss  [ ]poor nutritional intake  [ ]anasarca      [ ]Artificial Nutrition      REFERRALS:   [ ]Chaplaincy  [ ]Hospice  [ ]Child Life  [ ]Social Work  [ ]Case management [ ]Holistic Therapy     Goals of Care Document:          HPI:    88 yo female pmhx HTN, provoked DVT/PE on eliquis, HFrEF(LVEF 35-40% in 9/22) s/p AICD, RA, CKD stage 5 sent from NH to ED yesterday for elevated SCr noted on routine labs- admitted for OMERO on CKD. Patient was being treated for UTI at NH. Patient has been having recent symptoms of depression as well. Palliative consulted for GOC.     INTERVAL EVENTS:  - patient endorses no pain or discomfort. not answering all of my questions. she does endorse that the only thing she wants is to go home.   - she is OK with me reaching out to her children to discuss plan of care     ADVANCE DIRECTIVES:    MOL  [ ]  Living Will  [ ]   DECISION MAKER(s):  [ X] Health Care Proxy(s)  [ ] Surrogate(s)  [ ] Guardian           Name(s): Phone Number(s): Leona (dtr)    BASELINE (I)ADL(s) (prior to admission):  [place]: [ ]Total  [ ] Moderate [X ]Dependent  Palliative Performance Status Version 2:       30  %    http://npcrc.org/files/news/palliative_performance_scale_ppsv2.pdf    Allergies    cephalosporins (Angioedema)  Keflex (Swelling)    Intolerances    MEDICATIONS  (STANDING):  allopurinol 100 milliGRAM(s) Oral daily  apixaban 2.5 milliGRAM(s) Oral two times a day  atorvastatin 20 milliGRAM(s) Oral at bedtime  calcium acetate 1334 milliGRAM(s) Oral three times a day with meals  dronabinol 2.5 milliGRAM(s) Oral two times a day  isosorbide   mononitrate ER Tablet (IMDUR) 60 milliGRAM(s) Oral daily  lactated ringers. 1000 milliLiter(s) (50 mL/Hr) IV Continuous <Continuous>  latanoprost 0.005% Ophthalmic Solution 1 Drop(s) Both EYES <User Schedule>  metoprolol succinate ER 25 milliGRAM(s) Oral daily  mirtazapine 7.5 milliGRAM(s) Oral at bedtime  pantoprazole    Tablet 40 milliGRAM(s) Oral before breakfast  senna 2 Tablet(s) Oral at bedtime  timolol 0.5% Solution 1 Drop(s) Both EYES two times a day    MEDICATIONS  (PRN):  acetaminophen     Tablet .. 650 milliGRAM(s) Oral every 6 hours PRN Temp greater or equal to 38C (100.4F)  meclizine 12.5 milliGRAM(s) Oral three times a day PRN Dizziness  oxyCODONE    IR 5 milliGRAM(s) Oral every 6 hours PRN Severe Pain (7 - 10)    PRESENT SYMPTOMS:  Pain: [ ]yes [X ]no  QOL impact -   Location -                    Aggravating factors -  Quality -  Radiation -  Timing-  Severity (0-10 scale):  Minimal acceptable level (0-10 scale):     Dyspnea:                           [ ]Mild [ ]Moderate [ ]Severe - denies  Anxiety:                             [ ]Mild [ ]Moderate [ ]Severe  Fatigue:                             [ ]Mild [ ]Moderate [ ]Severe  Nausea:                             [ ]Mild [ ]Moderate [ ]Severe  Loss of appetite:              [ ]Mild [ ]Moderate [ X]Severe - reports not having hunger today   Constipation:                    [ ]Mild [ ]Moderate [ ]Severe    Other Symptoms:  [X ]All other review of systems negative     Palliative Performance Status Version 2:      30   %    http://npcrc.org/files/news/palliative_performance_scale_ppsv2.pdf    PHYSICAL EXAM:  Vital Signs Last 24 Hrs  T(C): 35.6 (23 Dec 2022 05:25), Max: 36.4 (22 Dec 2022 20:00)  T(F): 96.1 (23 Dec 2022 05:25), Max: 97.5 (22 Dec 2022 20:00)  HR: 82 (23 Dec 2022 05:25) (79 - 82)  BP: 144/76 (23 Dec 2022 05:25) (111/61 - 144/76)  RR: 19 (23 Dec 2022 05:25) (19 - 20)  SpO2: 98% (23 Dec 2022 05:25) (98% - 98%)    GENERAL:  [ X ]Alert  [X ]Oriented x  2-3 [ ]Lethargic  [ ]Cachexia  [ ]Unarousable  [ X]Verbal  [ ]Non-Verbal  Behavioral:   [ ] Anxiety  [ ] Delirium [ ] Agitation [ X] calm  HEENT:  [ X]Normal   [ ]Dry mouth   [ ]ET Tube/Trach  [ ]Oral lesions  PULMONARY:   [ ]Clear [X ] No Tachypnea  [ ]Audible excessive secretions   [ ]Rhonchi        [ ]Right [ ]Left [ ]Bilateral  [ ]Crackles        [ ]Right [ ]Left [ ]Bilateral  [ ]Wheezing     [ ]Right [ ]Left [ ]Bilateral  [ ]Diminished breath sounds [ ]right [ ]left [ ]bilateral  GASTROINTESTINAL:  [X ]Soft  [ ]Distended   [ ]+BS  [ ]Non tender [ ]Tender  [ ]PEG [ ]OGT/ NGT  Last BM:   GENITOURINARY:  [ X ]Normal [ ] Incontinent   [ ]Oliguria/Anuria   [ ]Morgan  MUSCULOSKELETAL:   [ ]Normal   [ ]Weakness  [X ]Bed/Wheelchair bound [ ]Edema  NEUROLOGIC:   [ X ]No focal deficits  [ ]Cognitive impairment  [ ]Dysphagia [ ]Dysarthria [ ]Paresis [ ]Other   SKIN:   [X ]Normal    [ ]Rash  [ ]Pressure ulcer(s)       Present on admission [ ]y [ ]n      LABS:                        11.5   8.06  )-----------( 195      ( 23 Dec 2022 07:45 )             37.3   12-23    138  |  102  |  77<HH>  ----------------------------<  131<H>  4.6   |  24  |  3.0<H>    Ca    9.0      23 Dec 2022 07:45  Mg     1.8     12-23    TPro  5.7<L>  /  Alb  2.8<L>  /  TBili  0.2  /  DBili  x   /  AST  31  /  ALT  15  /  AlkPhos  100  12-23        RADIOLOGY & ADDITIONAL STUDIES:    < from: 12 Lead ECG (12.19.22 @ 21:37) >  Ventricular Rate 87 BPM    Atrial Rate 87 BPM    P-R Interval 166 ms    QRS Duration 138 ms    Q-T Interval 414 ms    QTC Calculation(Bazett) 498 ms    P Axis 19 degrees    R Axis -28 degrees    T Axis 162 degrees    Diagnosis Line Normal sinus rhythm  Left bundle branch block  Abnormal ECG    Confirmed by Marian Barillas MD ([id number]) on 12/20/2022 4:57:23 PM    < end of copied text >    < from: Xray Chest 1 View- PORTABLE-Routine (Xray Chest 1 View- PORTABLE-Routine .) (12.19.22 @ 22:58) >  Impression:    No radiographic evidence of acute cardiopulmonary disease.        < end of copied text >      REFERRALS:   [ ]Chaplaincy  [X Hospice  [ ]Child Life  [ ]Social Work  [ ]Case management [ ]Holistic Therapy

## 2022-12-23 NOTE — PROGRESS NOTE ADULT - ASSESSMENT
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 Patient was being treated for UTI at NH. Patient has been having recent symptoms of depression as well. Palliative consulted for GOC.   86 yo female pmhx HTN, provoked DVT/PE on eliquis, HFrEF(LVEF 35-40% in 9/22) s/p AICD, RA, CKD stage 5 sent from NH to ED yesterday for elevated SCr noted on routine labs- admitted for OMERO on CKD. Patient was being treated for UTI at NH. Patient has been having recent symptoms of depression as well. Palliative consulted for GOC.    Patient in NAD, but with depressed affect on exam, not participating much in GOC discussion. All children and patient agree to hospice consult, no feeding tubes. DNR/DNI placed.   Family also requested behavioral health consult prior to discharge for recommendations.   Will follow up.     MEDD (morphine equivalent daily dose): 7.5      See Recs below.    Please call [phone] with questions or concerns 24/7.   We will continue to follow.     Discussed with primary MD.

## 2022-12-23 NOTE — HOSPICE CARE NOTE - CONVESATION DETAILS
Consult completed via phone call to patient's daughter Leona. Reviewed hospice philosophy and services. Family aware that hospice can deliver DME on Tuesday and anticipate patient to be D/C on Wednesday with hospice services to be initiated Thursday morning. Patient would require for her HHA to be reinstated. Family may want to bring patient home  over the weekend. Leona aware hospice services can not be initiated until next week. Hospice contact information provided. Leona to discuss further with her family and call hospice with an update.

## 2022-12-24 PROCEDURE — 99233 SBSQ HOSP IP/OBS HIGH 50: CPT

## 2022-12-24 RX ADMIN — APIXABAN 2.5 MILLIGRAM(S): 2.5 TABLET, FILM COATED ORAL at 17:03

## 2022-12-24 RX ADMIN — LATANOPROST 1 DROP(S): 0.05 SOLUTION/ DROPS OPHTHALMIC; TOPICAL at 06:35

## 2022-12-24 RX ADMIN — Medication 1 DROP(S): at 06:36

## 2022-12-24 RX ADMIN — Medication 2.5 MILLIGRAM(S): at 06:39

## 2022-12-24 RX ADMIN — ATORVASTATIN CALCIUM 20 MILLIGRAM(S): 80 TABLET, FILM COATED ORAL at 21:56

## 2022-12-24 RX ADMIN — Medication 1334 MILLIGRAM(S): at 11:31

## 2022-12-24 RX ADMIN — Medication 100 MILLIGRAM(S): at 11:31

## 2022-12-24 RX ADMIN — APIXABAN 2.5 MILLIGRAM(S): 2.5 TABLET, FILM COATED ORAL at 06:36

## 2022-12-24 RX ADMIN — MIRTAZAPINE 7.5 MILLIGRAM(S): 45 TABLET, ORALLY DISINTEGRATING ORAL at 21:57

## 2022-12-24 RX ADMIN — Medication 1334 MILLIGRAM(S): at 17:03

## 2022-12-24 RX ADMIN — ISOSORBIDE MONONITRATE 60 MILLIGRAM(S): 60 TABLET, EXTENDED RELEASE ORAL at 11:31

## 2022-12-24 RX ADMIN — Medication 1 DROP(S): at 17:04

## 2022-12-24 RX ADMIN — Medication 25 MILLIGRAM(S): at 06:36

## 2022-12-24 RX ADMIN — PANTOPRAZOLE SODIUM 40 MILLIGRAM(S): 20 TABLET, DELAYED RELEASE ORAL at 06:36

## 2022-12-24 RX ADMIN — SENNA PLUS 2 TABLET(S): 8.6 TABLET ORAL at 21:56

## 2022-12-24 RX ADMIN — Medication 2.5 MILLIGRAM(S): at 17:03

## 2022-12-24 RX ADMIN — Medication 1334 MILLIGRAM(S): at 08:02

## 2022-12-24 RX ADMIN — LATANOPROST 1 DROP(S): 0.05 SOLUTION/ DROPS OPHTHALMIC; TOPICAL at 21:59

## 2022-12-24 NOTE — PROGRESS NOTE ADULT - ASSESSMENT
87F presents to the ED from NH for elevated creatinine subsequently found to have suspected UTI in ED admitted to medicine for OMERO on CKD5.     #Failure To Thrive   #Functional Quadraplegia / Bedbound   - Low BMI   - Refuses to eat and has been deconditioning since went to [facility] per Daughter and continues to deteriorate   - Daughter wants pt to return home NOT STR. She lives downstairs from her house  - Pt requires FULL CARE   - Palliative Care Consult for GOC Discussion and CODE STATUS as FULL CODE.   - f/u SLP recommending; Puree or Minced Diet w/ thin Liquids     #OMERO on CKD5   -SCr 4.7 on admission   -baseline SCr noted to be 3.0  per [facility],   -daughter recalls recent Bactrim exposure 2 weeks prior.   -Has been refusing po fluids in NH  -Gentle IVF LR 50cc/hr (not eating)   -Renal bladder ultrasound reviewed   -Hold home lasix   -avoid nephrotoxic agents   -Keep carlos to monitor urine output   -f/u spep, upep, serum immunofixation  -f/u nephrology in clinic     #CKD 5  -patient follows with outpatient neophrologist   -continue calcium acetate binders     #Hyperkalemia   -K 5.5 on admission, now 5.2 s/p Lokelma   -Low K diet   -f/u am     #Suspected UTI  #Dysuria  -UA+ in ed, culture pending   -patient w/culture +ecoli 12/6 s/p course of bactrim prior to admission   -completed Aztreonam for now ([facility]N Allergy)     #HFrEF  -last echo in chart from September '22   -s/p AICD   -No evidence of fluid overload on exam   -Hold home lasix dose in the setting of OMERO and poor oral intake    #Anxiety/Depression   -Remeron 7.5mg qHS   -Daughter reports that the patient has been withdrawn of recent, consider outpatient Psych follow up on discharge.  -Patient endorses emotionally traumatic episode in NH. Please get  to investigate.     #HLD  -continue lipitor 20mg qHS home dose.     #HTN   -Continue Metoprolol    #Hx of Provoked DVT/PE   -on eliquis 2.5mg BID     #Constipation   -senna 2tabs qHS     Activity: IAT   Diet: Low K Low phos   DVT ppx: home eliquis   GI ppx: protonix daily     Pending: Palliative / Hospice f/u next week   Family: Daughter and Son updated. Son wants FMLA Forms Signed however informed him needs to go to PMD to manage FMLA Forms or Hospice. Called PMD Dr Misael Shah [phone] awaiting call back.   Dispo: Home w/ Hospice Next week. (Daughter wants pt to go HOME and pt is FULL ASSIST w/ Functional Quadraplegia-FTT )       87F presents to the ED from NH for elevated creatinine subsequently found to have suspected UTI in ED admitted to medicine for OMERO on CKD5.     #Failure To Thrive   #Functional Quadraplegia / Bedbound   - Low BMI   - Refuses to eat and has been deconditioning since went to [facility] per Daughter and continues to deteriorate   - Daughter wants pt to return home NOT STR. She lives downstairs from her house  - Pt requires FULL CARE   - Palliative Care Consult for GOC Discussion and CODE STATUS as FULL CODE.   - f/u SLP recommending; Puree or Minced Diet w/ thin Liquids     #OMERO on CKD5   -SCr 4.7 on admission   -baseline SCr noted to be 3.0  per [facility],   -daughter recalls recent Bactrim exposure 2 weeks prior.   -Has been refusing po fluids in NH  -Gentle IVF LR 50cc/hr (not eating)   -Renal bladder ultrasound reviewed   -Hold home lasix   -avoid nephrotoxic agents   -Keep carlos to monitor urine output   -f/u spep, upep, serum immunofixation  -f/u nephrology in clinic     #CKD 5  -patient follows with outpatient neophrologist   -continue calcium acetate binders     #Hyperkalemia   -K 5.5 on admission, now 5.2 s/p Lokelma   -Low K diet   -f/u am     #Suspected UTI  #Dysuria  -UA+ in ed, culture pending   -patient w/culture +ecoli 12/6 s/p course of bactrim prior to admission   -completed Aztreonam for now (PCN Allergy)     #HFrEF  -last echo in chart from September '22   -s/p AICD   -No evidence of fluid overload on exam   -Hold home lasix dose in the setting of OMERO and poor oral intake    #Anxiety/Depression   -Remeron 7.5mg qHS   -Daughter reports that the patient has been withdrawn of recent, consider outpatient Psych follow up on discharge.  -Patient endorses emotionally traumatic episode in NH. Please get  to investigate.     #HLD  -continue lipitor 20mg qHS home dose.     #HTN   -Continue Metoprolol    #Hx of Provoked DVT/PE   -on eliquis 2.5mg BID     #Constipation   -senna 2tabs qHS     Activity: IAT   Diet: Low K Low phos   DVT ppx: home eliquis   GI ppx: protonix daily     Pending: Palliative / Hospice f/u next week   Family: Daughter and Son updated by Resident.    Called PMD Dr Misael Sloan at [phone] he informed me that Son is not the care taker of Mrs Marrufo and he is unable to authorize FMLA and has not seen him.   Dispo: Home w/ Hospice Next week. (Daughter wants pt to go HOME and pt is FULL ASSIST w/ Functional Quadraplegia-FTT )

## 2022-12-24 NOTE — PROGRESS NOTE ADULT - SUBJECTIVE AND OBJECTIVE BOX
Medical Student Note    Patient Information:  RACHEL SUMMERS / 85y / Female / MRN#:[id number]    Hospital Day: 4day     HPI:  86 yo F with pmhx of CHF, s/p pacemaker, DVT (on Eloquis), RA, gout, HTN, CKD was sent in to the ED by PMD Dr. Celeste for evaluation of worsening dyspnea on exertion which was associated with Left calf pain for few days. She also complained of dysuria and left sided flank pain x 2 days which was localized, non radiating, moderate in intensity and worsened with movement. Dyspnea and lower extremity swelling is exacerbated by walking. She denied any hematuria, fever, chills, paresthesias, nausea, vomiting, headache, dizziness.    Patient was received hypertensive in ER. Blood work showed mild anaemia, hyperkalemia, elevated creatinine and reduced GFR (unchanged from last blood work). Troponin were negative, BNP was elevated >22k. UA and COVID-19 was negative. CT abdomen and pelvis and CT chest showed Bibasilar peripheral opacities likely on the basis of subsegmental atelectasis. Patient is being admitted for monitoring and r/o volume overload secondary to kidney dysfunction versus CHF.      Attd: pt has long standing RA and chr body aches, jacob of left arm and back; she has tried RA meds in past, but they do not agree w/ her body; pt does NOT like to take strong narcotics; she says lidoderm helps; she is agreeable to tylenol and a muscle relaxant; she also has gout; pain is worse w/ mvmt, so seems musculoskel; pt was c/o SOB, but does not seem too vol overload; she denies palpitations or CP; she is usually not on home O2; she lives in a house w/ her son and her dtr has a day care on the second floor; she does not want to go to SNF, but she would like home PT for her unsteady gait; she is Muslim; she can eat and drink; she is well oriented and knows her med hx fairly well     Interval History:  Patient seen and examined at bedside. No acute events overnight. She reported excruciating back pain last night but this morning she is feeling improvement. She has been off the O2 and is 98% on room air. She had a bowel movement last night. She feels that she might be ready to go home tomorrow.      Past Medical History:  DVT, lower extremity  Rheumatoid arthritis  Diastolic heart failure  Atrial fibrillation  Diverticulitis  Gout  Hypertension  Pacemaker  Chronic kidney disease    Past Surgical History:  History of hysterectomy  History of tonsillectomy  History of appendectomy  Artificial pacemaker    Allergies:  Keflex (Swelling)    Medications:  PRN:  aluminum hydroxide/magnesium hydroxide/simethicone Suspension 30 milliLiter(s) Oral every 4 hours PRN Dyspepsia  morphine   Solution 2 milliGRAM(s) Oral every 8 hours PRN Severe Pain (7 - 10)    Home:  apixaban 2.5 mg oral tablet: 1 tab(s) orally 2 times a day  atorvastatin 40 mg oral tablet: 1 tab(s) orally once a day  calcium acetate 667 mg oral tablet: 1 tab(s) orally once a day  folic acid 1 mg oral tablet: 1 tab(s) orally once a day  furosemide 20 mg oral tablet: 1 tab(s) orally Tuesday, Thursday, Saturday,   latanoprost 0.005% ophthalmic solution: 1 drop(s) to each affected eye once a day (in the evening)  Metoprolol Succinate ER 25 mg oral tablet, extended release: 1 tab(s) orally once a day  sertraline 50 mg oral tablet: 1 tab(s) orally once a day    Vital Signs Last 24 Hrs  T(C): 35.9 (24 Dec 2022 04:25), Max: 35.9 (24 Dec 2022 04:25)  T(F): 96.7 (24 Dec 2022 04:25), Max: 96.7 (24 Dec 2022 04:25)  HR: 64 (24 Dec 2022 04:25) (64 - 71)  BP: 140/81 (24 Dec 2022 04:25) (116/62 - 140/81)  RR: 19 (24 Dec 2022 04:25) (19 - 19)  SpO2: 99% (24 Dec 2022 04:25) (99% - 99%)    Parameters below as of 24 Dec 2022 04:25  Patient On (Oxygen Delivery Method): room air    Physical Exam:  General: more awake today / sitting in bed no distress   Head: Normocephalic atraumatic.  Neck: Supple  Heart: Regular rate and rhythm; S1, S2; No murmurs.  Lungs: Clear to auscultation bilaterally.  Abdomen: Soft, nontender, nondistended.  Extremities: No edema in upper or lower extremities.  Neuro: AAOx2, No focal deficits.    LABS:                         11.5   8.06  )-----------( 195      ( 23 Dec 2022 07:45 )             37.3         138  |  102  |  77  -----------------------<  131  4.6   |  24  |  3.0    Ca    9.0      23 Dec 2022 07:45    Mg     1.8         TPro  5.7<L>  /  Alb  2.8<L>  /  TBili  0.2  /  DBili  x   /  AST  31  /  ALT  15  /  AlkPhos  100                            12.9   8.03  )-----------( 197      ( 22 Dec 2022 04:30 )             40.4         140  |  105  |  92  ----------------------------<  73  5.4  |  19  |  3.7    Ca    8.8      22 Dec 2022 04:30  Phos  3.2     12-21  Mg     2.2         TPro  6.3  /  Alb  3.4<L>  /  TBili  0.4  /  DBili  x   /  AST  35  /  ALT  16  /  AlkPhos  115                            11.7   8.94  )-----------( 211      ( 21 Dec 2022 07:39 )             36.4         139  |  103  |  90<HH>  ----------------------------<  82  5.0   |  22  |  3.7<H>    Ca    9.2      21 Dec 2022 07:39  Phos  3.2     12-21  Mg     2.1     -    TPro  6.2  /  Alb  3.4<L>  /  TBili  0.3  /  DBili  x   /  AST  34  /  ALT  15  /  AlkPhos  111      Urinalysis Basic (12-15 @ 00:00)  Color: Colorless  Appearance: Clear  S.009  Ketone: Negative  Bili: Negative  Urobili: <2 mg/dL   Protein: 30 mg/dL  Nitrite: Negative   Leuk Esterase: Negative  RBC: 1  WBC: 0   Sq Epi:   Non Sq Epi: 0  Bacteria: Negative    Microbiology:  Culture - Urine (collected 12-15 @ 00:00)  Source: .Urine Clean Catch (Midstream)  Final Report ( @ 09:15):  <10,000 CFU/mL Normal Urogenital Rand    Radiology: reviewed     MEDICATIONS  (STANDING):  allopurinol 100 milliGRAM(s) Oral daily  apixaban 2.5 milliGRAM(s) Oral two times a day  atorvastatin 20 milliGRAM(s) Oral at bedtime  calcium acetate 1334 milliGRAM(s) Oral three times a day with meals  dronabinol 2.5 milliGRAM(s) Oral two times a day  isosorbide   mononitrate ER Tablet (IMDUR) 60 milliGRAM(s) Oral daily  lactated ringers. 1000 milliLiter(s) (50 mL/Hr) IV Continuous <Continuous>  latanoprost 0.005% Ophthalmic Solution 1 Drop(s) Both EYES <User Schedule>  metoprolol succinate ER 25 milliGRAM(s) Oral daily  mirtazapine 7.5 milliGRAM(s) Oral at bedtime  pantoprazole    Tablet 40 milliGRAM(s) Oral before breakfast  senna 2 Tablet(s) Oral at bedtime  timolol 0.5% Solution 1 Drop(s) Both EYES two times a day    MEDICATIONS  (PRN):  acetaminophen     Tablet .. 650 milliGRAM(s) Oral every 6 hours PRN Temp greater or equal to 38C (100.4F)  meclizine 12.5 milliGRAM(s) Oral three times a day PRN Dizziness       [Mother] : mother [Father] : father [Grade:  _____] : Grade: [unfilled] [House] : [unfilled] lives in a house  [Central Forced Air] : heating provided by central forced air [Central] : air conditioning provided by central unit [Dehumidifier] : uses a dehumidifier [Dry] : dry [None] : none [Single] : single [Bedroom] : not in the bedroom [Basement] : not in the basement [Living Area] : not in the living area [Smokers in Household] : there are no smokers in the home

## 2022-12-25 LAB
ALBUMIN SERPL ELPH-MCNC: 3.1 G/DL — LOW (ref 3.5–5.2)
ALP SERPL-CCNC: 92 U/L — SIGNIFICANT CHANGE UP (ref 30–115)
ALT FLD-CCNC: 12 U/L — SIGNIFICANT CHANGE UP (ref 0–41)
ANION GAP SERPL CALC-SCNC: 13 MMOL/L — SIGNIFICANT CHANGE UP (ref 7–14)
AST SERPL-CCNC: 24 U/L — SIGNIFICANT CHANGE UP (ref 0–41)
BASOPHILS # BLD AUTO: 0.02 K/UL — SIGNIFICANT CHANGE UP (ref 0–0.2)
BASOPHILS NFR BLD AUTO: 0.2 % — SIGNIFICANT CHANGE UP (ref 0–1)
BILIRUB SERPL-MCNC: 0.3 MG/DL — SIGNIFICANT CHANGE UP (ref 0.2–1.2)
BUN SERPL-MCNC: 60 MG/DL — HIGH (ref 10–20)
CALCIUM SERPL-MCNC: 8.6 MG/DL — SIGNIFICANT CHANGE UP (ref 8.4–10.5)
CHLORIDE SERPL-SCNC: 103 MMOL/L — SIGNIFICANT CHANGE UP (ref 98–110)
CO2 SERPL-SCNC: 23 MMOL/L — SIGNIFICANT CHANGE UP (ref 17–32)
CREAT SERPL-MCNC: 2 MG/DL — HIGH (ref 0.7–1.5)
EGFR: 24 ML/MIN/1.73M2 — LOW
EOSINOPHIL # BLD AUTO: 0.34 K/UL — SIGNIFICANT CHANGE UP (ref 0–0.7)
EOSINOPHIL NFR BLD AUTO: 3 % — SIGNIFICANT CHANGE UP (ref 0–8)
GLUCOSE SERPL-MCNC: 75 MG/DL — SIGNIFICANT CHANGE UP (ref 70–99)
HCT VFR BLD CALC: 33.5 % — LOW (ref 37–47)
HGB BLD-MCNC: 10.7 G/DL — LOW (ref 12–16)
IMM GRANULOCYTES NFR BLD AUTO: 0.5 % — HIGH (ref 0.1–0.3)
LYMPHOCYTES # BLD AUTO: 1.47 K/UL — SIGNIFICANT CHANGE UP (ref 1.2–3.4)
LYMPHOCYTES # BLD AUTO: 12.9 % — LOW (ref 20.5–51.1)
MAGNESIUM SERPL-MCNC: 1.7 MG/DL — LOW (ref 1.8–2.4)
MCHC RBC-ENTMCNC: 28.4 PG — SIGNIFICANT CHANGE UP (ref 27–31)
MCHC RBC-ENTMCNC: 31.9 G/DL — LOW (ref 32–37)
MCV RBC AUTO: 88.9 FL — SIGNIFICANT CHANGE UP (ref 81–99)
MONOCYTES # BLD AUTO: 1.06 K/UL — HIGH (ref 0.1–0.6)
MONOCYTES NFR BLD AUTO: 9.3 % — SIGNIFICANT CHANGE UP (ref 1.7–9.3)
NEUTROPHILS # BLD AUTO: 8.42 K/UL — HIGH (ref 1.4–6.5)
NEUTROPHILS NFR BLD AUTO: 74.1 % — SIGNIFICANT CHANGE UP (ref 42.2–75.2)
NRBC # BLD: 0 /100 WBCS — SIGNIFICANT CHANGE UP (ref 0–0)
PLATELET # BLD AUTO: 220 K/UL — SIGNIFICANT CHANGE UP (ref 130–400)
POTASSIUM SERPL-MCNC: 4.5 MMOL/L — SIGNIFICANT CHANGE UP (ref 3.5–5)
POTASSIUM SERPL-SCNC: 4.5 MMOL/L — SIGNIFICANT CHANGE UP (ref 3.5–5)
PROT SERPL-MCNC: 5.8 G/DL — LOW (ref 6–8)
PROT SERPL-MCNC: 6.2 G/DL — SIGNIFICANT CHANGE UP (ref 6–8.3)
PROT SERPL-MCNC: 6.2 G/DL — SIGNIFICANT CHANGE UP (ref 6–8.3)
RBC # BLD: 3.77 M/UL — LOW (ref 4.2–5.4)
RBC # FLD: 14.6 % — HIGH (ref 11.5–14.5)
SODIUM SERPL-SCNC: 139 MMOL/L — SIGNIFICANT CHANGE UP (ref 135–146)
WBC # BLD: 11.37 K/UL — HIGH (ref 4.8–10.8)
WBC # FLD AUTO: 11.37 K/UL — HIGH (ref 4.8–10.8)

## 2022-12-25 PROCEDURE — 99232 SBSQ HOSP IP/OBS MODERATE 35: CPT

## 2022-12-25 RX ORDER — MAGNESIUM SULFATE 500 MG/ML
2 VIAL (ML) INJECTION ONCE
Refills: 0 | Status: COMPLETED | OUTPATIENT
Start: 2022-12-25 | End: 2022-12-25

## 2022-12-25 RX ADMIN — Medication 1334 MILLIGRAM(S): at 12:12

## 2022-12-25 RX ADMIN — SENNA PLUS 2 TABLET(S): 8.6 TABLET ORAL at 21:41

## 2022-12-25 RX ADMIN — APIXABAN 2.5 MILLIGRAM(S): 2.5 TABLET, FILM COATED ORAL at 17:35

## 2022-12-25 RX ADMIN — Medication 25 GRAM(S): at 12:11

## 2022-12-25 RX ADMIN — Medication 1 DROP(S): at 17:35

## 2022-12-25 RX ADMIN — Medication 1 DROP(S): at 05:23

## 2022-12-25 RX ADMIN — Medication 2.5 MILLIGRAM(S): at 17:34

## 2022-12-25 RX ADMIN — Medication 1334 MILLIGRAM(S): at 17:35

## 2022-12-25 RX ADMIN — ISOSORBIDE MONONITRATE 60 MILLIGRAM(S): 60 TABLET, EXTENDED RELEASE ORAL at 12:11

## 2022-12-25 RX ADMIN — Medication 2.5 MILLIGRAM(S): at 05:23

## 2022-12-25 RX ADMIN — APIXABAN 2.5 MILLIGRAM(S): 2.5 TABLET, FILM COATED ORAL at 05:23

## 2022-12-25 RX ADMIN — Medication 100 MILLIGRAM(S): at 12:11

## 2022-12-25 RX ADMIN — Medication 25 MILLIGRAM(S): at 05:22

## 2022-12-25 RX ADMIN — LATANOPROST 1 DROP(S): 0.05 SOLUTION/ DROPS OPHTHALMIC; TOPICAL at 21:40

## 2022-12-25 RX ADMIN — LATANOPROST 1 DROP(S): 0.05 SOLUTION/ DROPS OPHTHALMIC; TOPICAL at 05:23

## 2022-12-25 RX ADMIN — MIRTAZAPINE 7.5 MILLIGRAM(S): 45 TABLET, ORALLY DISINTEGRATING ORAL at 21:41

## 2022-12-25 RX ADMIN — ATORVASTATIN CALCIUM 20 MILLIGRAM(S): 80 TABLET, FILM COATED ORAL at 21:41

## 2022-12-25 RX ADMIN — PANTOPRAZOLE SODIUM 40 MILLIGRAM(S): 20 TABLET, DELAYED RELEASE ORAL at 05:23

## 2022-12-25 RX ADMIN — Medication 1334 MILLIGRAM(S): at 08:12

## 2022-12-25 NOTE — PROGRESS NOTE ADULT - SUBJECTIVE AND OBJECTIVE BOX
Medical Student Note    Patient Information:  RACHEL SUMMERS / 85y / Female / MRN#:[id number]    Hospital Day: 5 day     HPI:  84 yo F with pmhx of CHF, s/p pacemaker, DVT (on Eloquis), RA, gout, HTN, CKD was sent in to the ED by PMD Dr. Celeste for evaluation of worsening dyspnea on exertion which was associated with Left calf pain for few days. She also complained of dysuria and left sided flank pain x 2 days which was localized, non radiating, moderate in intensity and worsened with movement. Dyspnea and lower extremity swelling is exacerbated by walking. She denied any hematuria, fever, chills, paresthesias, nausea, vomiting, headache, dizziness.    Patient was received hypertensive in ER. Blood work showed mild anaemia, hyperkalemia, elevated creatinine and reduced GFR (unchanged from last blood work). Troponin were negative, BNP was elevated >22k. UA and COVID-19 was negative. CT abdomen and pelvis and CT chest showed Bibasilar peripheral opacities likely on the basis of subsegmental atelectasis. Patient is being admitted for monitoring and r/o volume overload secondary to kidney dysfunction versus CHF.      Attd: pt has long standing RA and chr body aches, jacob of left arm and back; she has tried RA meds in past, but they do not agree w/ her body; pt does NOT like to take strong narcotics; she says lidoderm helps; she is agreeable to tylenol and a muscle relaxant; she also has gout; pain is worse w/ mvmt, so seems musculoskel; pt was c/o SOB, but does not seem too vol overload; she denies palpitations or CP; she is usually not on home O2; she lives in a house w/ her son and her dtr has a day care on the second floor; she does not want to go to SNF, but she would like home PT for her unsteady gait; she is Druze; she can eat and drink; she is well oriented and knows her med hx fairly well     Interval History:  Patient seen and examined at bedside. No acute events overnight. She reported excruciating back pain last night but this morning she is feeling improvement. She has been off the O2 and is 98% on room air. She had a bowel movement last night. She feels that she might be ready to go home tomorrow.      Past Medical History:  DVT, lower extremity  Rheumatoid arthritis  Diastolic heart failure  Atrial fibrillation  Diverticulitis  Gout  Hypertension  Pacemaker  Chronic kidney disease    Past Surgical History:  History of hysterectomy  History of tonsillectomy  History of appendectomy  Artificial pacemaker    Allergies:  Keflex (Swelling)    Medications:  PRN:  aluminum hydroxide/magnesium hydroxide/simethicone Suspension 30 milliLiter(s) Oral every 4 hours PRN Dyspepsia  morphine   Solution 2 milliGRAM(s) Oral every 8 hours PRN Severe Pain (7 - 10)    Home:  apixaban 2.5 mg oral tablet: 1 tab(s) orally 2 times a day  atorvastatin 40 mg oral tablet: 1 tab(s) orally once a day  calcium acetate 667 mg oral tablet: 1 tab(s) orally once a day  folic acid 1 mg oral tablet: 1 tab(s) orally once a day  furosemide 20 mg oral tablet: 1 tab(s) orally Tuesday, Thursday, Saturday,   latanoprost 0.005% ophthalmic solution: 1 drop(s) to each affected eye once a day (in the evening)  Metoprolol Succinate ER 25 mg oral tablet, extended release: 1 tab(s) orally once a day  sertraline 50 mg oral tablet: 1 tab(s) orally once a day    Vital Signs Last 24 Hrs  T(C): 35.9 (24 Dec 2022 04:25), Max: 35.9 (24 Dec 2022 04:25)  T(F): 96.7 (24 Dec 2022 04:25), Max: 96.7 (24 Dec 2022 04:25)  HR: 64 (24 Dec 2022 04:25) (64 - 71)  BP: 140/81 (24 Dec 2022 04:25) (116/62 - 140/81)  RR: 19 (24 Dec 2022 04:25) (19 - 19)  SpO2: 99% (24 Dec 2022 04:25) (99% - 99%)    Parameters below as of 24 Dec 2022 04:25  Patient On (Oxygen Delivery Method): room air    Physical Exam:  General: more awake today / sitting in bed no distress   Head: Normocephalic atraumatic.  Neck: Supple  Heart: Regular rate and rhythm; S1, S2; No murmurs.  Lungs: Clear to auscultation bilaterally.  Abdomen: Soft, nontender, nondistended.  Extremities: No edema in upper or lower extremities.  Neuro: AAOx2, No focal deficits.    LABS:                         10.7   11.37 )-----------( 220      ( 25 Dec 2022 06:58 )             33.5        139  |  103  |  60<H>  ----------------------------<  75  4.5   |  23  |  2.0<H>    Ca    8.6      25 Dec 2022 06:58    Mg     1.7         TPro  5.8<L>  /  Alb  3.1<L>  /  TBili  0.3  /  DBili  x   /  AST  24  /  ALT  12  /  AlkPhos  92                          11.5   8.06  )-----------( 195      ( 23 Dec 2022 07:45 )             37.3         138  |  102  |  77  -----------------------<  131  4.6   |  24  |  3.0    Ca    9.0      23 Dec 2022 07:45    Mg     1.8         TPro  5.7<L>  /  Alb  2.8<L>  /  TBili  0.2  /  DBili  x   /  AST  31  /  ALT  15  /  AlkPhos  100                            12.9   8.03  )-----------( 197      ( 22 Dec 2022 04:30 )             40.4         140  |  105  |  92  ----------------------------<  73  5.4  |  19  |  3.7    Ca    8.8      22 Dec 2022 04:30  Phos  3.2       Mg     2.2         TPro  6.3  /  Alb  3.4<L>  /  TBili  0.4  /  DBili  x   /  AST  35  /  ALT  16  /  AlkPhos  115                          11.7   8.94  )-----------( 211      ( 21 Dec 2022 07:39 )             36.4         139  |  103  |  90<HH>  ----------------------------<  82  5.0   |  22  |  3.7<H>    Ca    9.2      21 Dec 2022 07:39  Phos  3.2       Mg     2.1         TPro  6.2  /  Alb  3.4<L>  /  TBili  0.3  /  DBili  x   /  AST  34  /  ALT  15  /  AlkPhos  111      Urinalysis Basic (12-15 @ 00:00)  Color: Colorless  Appearance: Clear  S.009  Ketone: Negative  Bili: Negative  Urobili: <2 mg/dL   Protein: 30 mg/dL  Nitrite: Negative   Leuk Esterase: Negative  RBC: 1  WBC: 0   Sq Epi:   Non Sq Epi: 0  Bacteria: Negative    Microbiology:  Culture - Urine (collected 12-15 @ 00:00)  Source: .Urine Clean Catch (Midstream)  Final Report ( @ 09:15):  <10,000 CFU/mL Normal Urogenital Rand    Radiology: reviewed     MEDICATIONS  (STANDING):  allopurinol 100 milliGRAM(s) Oral daily  apixaban 2.5 milliGRAM(s) Oral two times a day  atorvastatin 20 milliGRAM(s) Oral at bedtime  calcium acetate 1334 milliGRAM(s) Oral three times a day with meals  dronabinol 2.5 milliGRAM(s) Oral two times a day  isosorbide   mononitrate ER Tablet (IMDUR) 60 milliGRAM(s) Oral daily  lactated ringers. 1000 milliLiter(s) (50 mL/Hr) IV Continuous <Continuous>  latanoprost 0.005% Ophthalmic Solution 1 Drop(s) Both EYES <User Schedule>  metoprolol succinate ER 25 milliGRAM(s) Oral daily  mirtazapine 7.5 milliGRAM(s) Oral at bedtime  pantoprazole    Tablet 40 milliGRAM(s) Oral before breakfast  senna 2 Tablet(s) Oral at bedtime  timolol 0.5% Solution 1 Drop(s) Both EYES two times a day    MEDICATIONS  (PRN):  acetaminophen     Tablet .. 650 milliGRAM(s) Oral every 6 hours PRN Temp greater or equal to 38C (100.4F)  meclizine 12.5 milliGRAM(s) Oral three times a day PRN Dizziness

## 2022-12-25 NOTE — PROGRESS NOTE ADULT - ASSESSMENT
87F presents to the ED from NH for elevated creatinine subsequently found to have suspected UTI in ED admitted to medicine for OMERO on CKD5.     #Failure To Thrive   #Functional Quadraplegia / Bedbound   - Low BMI   - Refuses to eat and has been deconditioning since went to STR per Daughter and continues to deteriorate   - Daughter wants pt to return home NOT STR. She lives downstairs from her house  - Pt requires FULL CARE   - Palliative Care Consult for GOC Discussion and CODE STATUS as FULL CODE.   - f/u SLP recommending; Puree or Minced Diet w/ thin Liquids     #OMERO on CKD5   -SCr 4.7 on admission   -baseline SCr noted to be 3.0  per [facility],   -daughter recalls recent Bactrim exposure 2 weeks prior.   -Has been refusing po fluids in NH  -Gentle IVF LR 50cc/hr (not eating)   -Renal bladder ultrasound reviewed   -Hold home lasix   -avoid nephrotoxic agents   -Keep carlos to monitor urine output   -f/u spep, upep, serum immunofixation  -f/u nephrology in clinic     #CKD 5  -patient follows with outpatient neophrologist   -continue calcium acetate binders     #Hyperkalemia   -K 5.5 on admission, now 5.2 s/p Lokelma   -Low K diet   -f/u am     #Suspected UTI  #Dysuria  -UA+ in ed, culture pending   -patient w/culture +ecoli 12/6 s/p course of bactrim prior to admission   -completed Aztreonam for now (PCN Allergy)     #HFrEF  -last echo in chart from September '22   -s/p AICD   -No evidence of fluid overload on exam   -Hold home lasix dose in the setting of OMERO and poor oral intake    #Anxiety/Depression   -Remeron 7.5mg qHS   -Daughter reports that the patient has been withdrawn of recent, consider outpatient Psych follow up on discharge.  -Patient endorses emotionally traumatic episode in NH. Please get  to investigate.     #HLD  -continue lipitor 20mg qHS home dose.     #HTN   -Continue Metoprolol    #Hx of Provoked DVT/PE   -on eliquis 2.5mg BID     #Constipation   -senna 2tabs qHS     Activity: IAT   Diet: Low K Low phos   DVT ppx: home eliquis   GI ppx: protonix daily     Pending: Palliative / Hospice f/u next week   Family: Daughter and Son updated by Resident.    Called PMD Dr Misael Sloan at [phone] he informed me that Son is not the care taker of Mrs Marrufo and he is unable to authorize FMLA.   Dispo: Home w/ Hospice Next week. (Daughter wants pt to go HOME and pt is FULL ASSIST w/ Functional Quadraplegia-FTT )

## 2022-12-26 LAB
ALBUMIN SERPL ELPH-MCNC: 3.2 G/DL — LOW (ref 3.5–5.2)
ALP SERPL-CCNC: 94 U/L — SIGNIFICANT CHANGE UP (ref 30–115)
ALT FLD-CCNC: 13 U/L — SIGNIFICANT CHANGE UP (ref 0–41)
ANION GAP SERPL CALC-SCNC: 10 MMOL/L — SIGNIFICANT CHANGE UP (ref 7–14)
AST SERPL-CCNC: 24 U/L — SIGNIFICANT CHANGE UP (ref 0–41)
BASOPHILS # BLD AUTO: 0.05 K/UL — SIGNIFICANT CHANGE UP (ref 0–0.2)
BASOPHILS NFR BLD AUTO: 0.6 % — SIGNIFICANT CHANGE UP (ref 0–1)
BILIRUB SERPL-MCNC: 0.3 MG/DL — SIGNIFICANT CHANGE UP (ref 0.2–1.2)
BUN SERPL-MCNC: 49 MG/DL — HIGH (ref 10–20)
CALCIUM SERPL-MCNC: 8.8 MG/DL — SIGNIFICANT CHANGE UP (ref 8.4–10.5)
CHLORIDE SERPL-SCNC: 102 MMOL/L — SIGNIFICANT CHANGE UP (ref 98–110)
CO2 SERPL-SCNC: 25 MMOL/L — SIGNIFICANT CHANGE UP (ref 17–32)
CREAT SERPL-MCNC: 2 MG/DL — HIGH (ref 0.7–1.5)
EGFR: 24 ML/MIN/1.73M2 — LOW
EOSINOPHIL # BLD AUTO: 0.83 K/UL — HIGH (ref 0–0.7)
EOSINOPHIL NFR BLD AUTO: 10 % — HIGH (ref 0–8)
GLUCOSE SERPL-MCNC: 93 MG/DL — SIGNIFICANT CHANGE UP (ref 70–99)
HCT VFR BLD CALC: 36.1 % — LOW (ref 37–47)
HGB BLD-MCNC: 11.4 G/DL — LOW (ref 12–16)
IMM GRANULOCYTES NFR BLD AUTO: 0.5 % — HIGH (ref 0.1–0.3)
LYMPHOCYTES # BLD AUTO: 1.45 K/UL — SIGNIFICANT CHANGE UP (ref 1.2–3.4)
LYMPHOCYTES # BLD AUTO: 17.6 % — LOW (ref 20.5–51.1)
MAGNESIUM SERPL-MCNC: 1.9 MG/DL — SIGNIFICANT CHANGE UP (ref 1.8–2.4)
MCHC RBC-ENTMCNC: 27.9 PG — SIGNIFICANT CHANGE UP (ref 27–31)
MCHC RBC-ENTMCNC: 31.6 G/DL — LOW (ref 32–37)
MCV RBC AUTO: 88.5 FL — SIGNIFICANT CHANGE UP (ref 81–99)
MONOCYTES # BLD AUTO: 1.11 K/UL — HIGH (ref 0.1–0.6)
MONOCYTES NFR BLD AUTO: 13.4 % — HIGH (ref 1.7–9.3)
NEUTROPHILS # BLD AUTO: 4.78 K/UL — SIGNIFICANT CHANGE UP (ref 1.4–6.5)
NEUTROPHILS NFR BLD AUTO: 57.9 % — SIGNIFICANT CHANGE UP (ref 42.2–75.2)
NRBC # BLD: 0 /100 WBCS — SIGNIFICANT CHANGE UP (ref 0–0)
PLATELET # BLD AUTO: 236 K/UL — SIGNIFICANT CHANGE UP (ref 130–400)
POTASSIUM SERPL-MCNC: 4.9 MMOL/L — SIGNIFICANT CHANGE UP (ref 3.5–5)
POTASSIUM SERPL-SCNC: 4.9 MMOL/L — SIGNIFICANT CHANGE UP (ref 3.5–5)
PROT SERPL-MCNC: 6 G/DL — SIGNIFICANT CHANGE UP (ref 6–8)
RBC # BLD: 4.08 M/UL — LOW (ref 4.2–5.4)
RBC # FLD: 14.7 % — HIGH (ref 11.5–14.5)
SODIUM SERPL-SCNC: 137 MMOL/L — SIGNIFICANT CHANGE UP (ref 135–146)
WBC # BLD: 8.26 K/UL — SIGNIFICANT CHANGE UP (ref 4.8–10.8)
WBC # FLD AUTO: 8.26 K/UL — SIGNIFICANT CHANGE UP (ref 4.8–10.8)

## 2022-12-26 PROCEDURE — 99233 SBSQ HOSP IP/OBS HIGH 50: CPT

## 2022-12-26 RX ADMIN — Medication 1334 MILLIGRAM(S): at 18:07

## 2022-12-26 RX ADMIN — LATANOPROST 1 DROP(S): 0.05 SOLUTION/ DROPS OPHTHALMIC; TOPICAL at 05:32

## 2022-12-26 RX ADMIN — APIXABAN 2.5 MILLIGRAM(S): 2.5 TABLET, FILM COATED ORAL at 17:24

## 2022-12-26 RX ADMIN — ISOSORBIDE MONONITRATE 60 MILLIGRAM(S): 60 TABLET, EXTENDED RELEASE ORAL at 11:32

## 2022-12-26 RX ADMIN — MIRTAZAPINE 7.5 MILLIGRAM(S): 45 TABLET, ORALLY DISINTEGRATING ORAL at 21:36

## 2022-12-26 RX ADMIN — Medication 1 DROP(S): at 17:26

## 2022-12-26 RX ADMIN — PANTOPRAZOLE SODIUM 40 MILLIGRAM(S): 20 TABLET, DELAYED RELEASE ORAL at 05:33

## 2022-12-26 RX ADMIN — APIXABAN 2.5 MILLIGRAM(S): 2.5 TABLET, FILM COATED ORAL at 05:33

## 2022-12-26 RX ADMIN — ATORVASTATIN CALCIUM 20 MILLIGRAM(S): 80 TABLET, FILM COATED ORAL at 21:36

## 2022-12-26 RX ADMIN — Medication 1334 MILLIGRAM(S): at 11:32

## 2022-12-26 RX ADMIN — LATANOPROST 1 DROP(S): 0.05 SOLUTION/ DROPS OPHTHALMIC; TOPICAL at 21:35

## 2022-12-26 RX ADMIN — Medication 25 MILLIGRAM(S): at 05:32

## 2022-12-26 RX ADMIN — Medication 100 MILLIGRAM(S): at 11:32

## 2022-12-26 RX ADMIN — Medication 2.5 MILLIGRAM(S): at 05:35

## 2022-12-26 RX ADMIN — SODIUM CHLORIDE 50 MILLILITER(S): 9 INJECTION, SOLUTION INTRAVENOUS at 18:06

## 2022-12-26 RX ADMIN — Medication 2.5 MILLIGRAM(S): at 17:27

## 2022-12-26 RX ADMIN — Medication 1 DROP(S): at 05:32

## 2022-12-26 RX ADMIN — SENNA PLUS 2 TABLET(S): 8.6 TABLET ORAL at 21:36

## 2022-12-26 RX ADMIN — Medication 1334 MILLIGRAM(S): at 08:21

## 2022-12-26 NOTE — PROGRESS NOTE ADULT - ASSESSMENT
87F presents to the ED from NH for elevated creatinine subsequently found to have suspected UTI in ED admitted to medicine for OMERO on CKD5.     #Failure To Thrive   #Functional Quadraplegia / Bedbound   Patient Refuses to eat and has been deconditioning since went to STR per Daughter and continues to deteriorate   - Daughter wants pt to return home NOT STR. Pt requires FULL CARE   - Per Hospice note: Family aware that hospice can deliver DME on Tuesday and anticipate patient to be D/C on Wednesday with hospice services to be initiated Thursday morning. Patient would require for her HHA to be reinstated.  - On Puree or Minced Diet w/ thin Liquids as per SLP    #OMERO on CKD5   -SCr 4.7 on admission   -baseline SCr noted to be 3.0  per [facility],   -daughter recalls recent Bactrim exposure 2 weeks prior.   -Has been refusing po fluids in NH  -On LR 50cc/hr   -Hold home lasix   -avoid nephrotoxic agents   -Keep carlos to monitor urine output   -f/u spep, upep, serum immunofixation  -f/u nephrology in clinic     #CKD 5  -patient follows with outpatient neophrologist   -continue calcium acetate binders     #Hyperkalemia   -K 5.5 on admission, now 5.2 s/p Lokelma   -Low K diet     #Suspected UTI  #Dysuria  -UA+ in ed, Ucx. 12/19-E. Coli  -patient w/culture +ecoli 12/6 s/p course of bactrim prior to admission   -completed Aztreonam for now (PCN Allergy)     #HFrEF  -last echo in chart from September '22   -s/p AICD   -No evidence of fluid overload on exam   -Hold home lasix dose in the setting of OMERO and poor oral intake    #Anxiety/Depression   -Remeron 7.5mg qHS   -Daughter reports that the patient has been withdrawn of recent, consider outpatient Psych follow up on discharge.  -Patient endorses emotionally traumatic episode in NH. Please get  to investigate.     #HLD  -continue lipitor 20mg qHS home dose.     #HTN   -Continue Metoprolol    #Hx of Provoked DVT/PE   -on eliquis 2.5mg BID     #Constipation   -senna 2tabs qHS     Activity: IAT   Diet: Low K Low phos   DVT ppx: home eliquis   GI ppx: protonix daily     Pending: Hospice will ask for D/C; -f/u spep, upep, serum immunofixation  Dispo: Home w/ Hospice Next week. (Daughter wants pt to go HOME and pt is FULL ASSIST w/ Functional Quadraplegia-FTT )

## 2022-12-26 NOTE — PROGRESS NOTE ADULT - SUBJECTIVE AND OBJECTIVE BOX
OVERNIGHT EVENTS:    SUBJECTIVE / INTERVAL HPI: Patient seen and examined at bedside.     VITAL SIGNS:  Vital Signs Last 24 Hrs  T(C): 36.1 (26 Dec 2022 05:00), Max: 36.7 (25 Dec 2022 14:35)  T(F): 97 (26 Dec 2022 05:00), Max: 98.1 (25 Dec 2022 14:35)  HR: 77 (26 Dec 2022 05:00) (76 - 78)  BP: 142/74 (26 Dec 2022 05:00) (136/76 - 151/77)  BP(mean): --  RR: 18 (26 Dec 2022 05:00) (18 - 18)  SpO2: 98% (25 Dec 2022 21:00) (98% - 98%)    Parameters below as of 25 Dec 2022 21:00  Patient On (Oxygen Delivery Method): room air        PHYSICAL EXAM:    General: NAD, endorsing hunger and alert.  HEENT: NC/AT;  Neck: supple  Cardiovascular: +S1/S2, RRR, no murmurs, rubs, gallops  Respiratory: CTA B/L; no W/R/R  Gastrointestinal: soft, NT/ND; +BSx4  Extremities: WWP; no edema, clubbing or cyanosis  Vascular: 2+ radial, DP/PT pulses B/L  Neurological: AAOx2; no focal deficits    MEDICATIONS:  MEDICATIONS  (STANDING):  allopurinol 100 milliGRAM(s) Oral daily  apixaban 2.5 milliGRAM(s) Oral two times a day  atorvastatin 20 milliGRAM(s) Oral at bedtime  calcium acetate 1334 milliGRAM(s) Oral three times a day with meals  dronabinol 2.5 milliGRAM(s) Oral two times a day  isosorbide   mononitrate ER Tablet (IMDUR) 60 milliGRAM(s) Oral daily  lactated ringers. 1000 milliLiter(s) (50 mL/Hr) IV Continuous <Continuous>  latanoprost 0.005% Ophthalmic Solution 1 Drop(s) Both EYES <User Schedule>  metoprolol succinate ER 25 milliGRAM(s) Oral daily  mirtazapine 7.5 milliGRAM(s) Oral at bedtime  pantoprazole    Tablet 40 milliGRAM(s) Oral before breakfast  senna 2 Tablet(s) Oral at bedtime  timolol 0.5% Solution 1 Drop(s) Both EYES two times a day    MEDICATIONS  (PRN):  acetaminophen     Tablet .. 650 milliGRAM(s) Oral every 6 hours PRN Temp greater or equal to 38C (100.4F)  meclizine 12.5 milliGRAM(s) Oral three times a day PRN Dizziness  oxyCODONE    IR 5 milliGRAM(s) Oral every 6 hours PRN Severe Pain (7 - 10)      ALLERGIES:  Allergies    cephalosporins (Angioedema)  Keflex (Swelling)    Intolerances        LABS:                        11.4   8.26  )-----------( 236      ( 26 Dec 2022 06:24 )             36.1     12-26    137  |  102  |  49<H>  ----------------------------<  93  4.9   |  25  |  2.0<H>    Ca    8.8      26 Dec 2022 06:24  Mg     1.9     12-26    TPro  6.0  /  Alb  3.2<L>  /  TBili  0.3  /  DBili  x   /  AST  24  /  ALT  13  /  AlkPhos  94  12-26        CAPILLARY BLOOD GLUCOSE          RADIOLOGY & ADDITIONAL TESTS: Reviewed.    PLAN:

## 2022-12-26 NOTE — PROGRESS NOTE ADULT - SUBJECTIVE AND OBJECTIVE BOX
Medical Student Note    Patient Information:    RACHEL SUMMERS / 85y / Female / MRN#:[id number]    Hospital Day: 6 day     HPI:  84 yo F with pmhx of CHF, s/p pacemaker, DVT (on Eloquis), RA, gout, HTN, CKD was sent in to the ED by PMD Dr. Celeste for evaluation of worsening dyspnea on exertion which was associated with Left calf pain for few days. She also complained of dysuria and left sided flank pain x 2 days which was localized, non radiating, moderate in intensity and worsened with movement. Dyspnea and lower extremity swelling is exacerbated by walking. She denied any hematuria, fever, chills, paresthesias, nausea, vomiting, headache, dizziness.    Patient was received hypertensive in ER. Blood work showed mild anaemia, hyperkalemia, elevated creatinine and reduced GFR (unchanged from last blood work). Troponin were negative, BNP was elevated >22k. UA and COVID-19 was negative. CT abdomen and pelvis and CT chest showed Bibasilar peripheral opacities likely on the basis of subsegmental atelectasis. Patient is being admitted for monitoring and r/o volume overload secondary to kidney dysfunction versus CHF.      Attd: pt has long standing RA and chr body aches, jacob of left arm and back; she has tried RA meds in past, but they do not agree w/ her body; pt does NOT like to take strong narcotics; she says lidoderm helps; she is agreeable to tylenol and a muscle relaxant; she also has gout; pain is worse w/ mvmt, so seems musculoskel; pt was c/o SOB, but does not seem too vol overload; she denies palpitations or CP; she is usually not on home O2; she lives in a house w/ her son and her dtr has a day care on the second floor; she does not want to go to SNF, but she would like home PT for her unsteady gait; she is [place]; she can eat and drink; she is well oriented and knows her med hx fairly well     Interval History:  Patient seen and examined at bedside. No acute events overnight. She reported excruciating back pain last night but this morning she is feeling improvement. She has been off the O2 and is 98% on room air. She had a bowel movement last night. She feels that she might be ready to go home tomorrow.      Past Medical History:  DVT, lower extremity  Rheumatoid arthritis  Diastolic heart failure  Atrial fibrillation  Diverticulitis  Gout  Hypertension  Pacemaker  Chronic kidney disease    Past Surgical History:  History of hysterectomy  History of tonsillectomy  History of appendectomy  Artificial pacemaker    Allergies:  Keflex (Swelling)    Medications:  PRN:  aluminum hydroxide/magnesium hydroxide/simethicone Suspension 30 milliLiter(s) Oral every 4 hours PRN Dyspepsia  morphine   Solution 2 milliGRAM(s) Oral every 8 hours PRN Severe Pain (7 - 10)    Home:  apixaban 2.5 mg oral tablet: 1 tab(s) orally 2 times a day  atorvastatin 40 mg oral tablet: 1 tab(s) orally once a day  calcium acetate 667 mg oral tablet: 1 tab(s) orally once a day  folic acid 1 mg oral tablet: 1 tab(s) orally once a day  furosemide 20 mg oral tablet: 1 tab(s) orally Tuesday, Thursday, Saturday,   latanoprost 0.005% ophthalmic solution: 1 drop(s) to each affected eye once a day (in the evening)  Metoprolol Succinate ER 25 mg oral tablet, extended release: 1 tab(s) orally once a day  sertraline 50 mg oral tablet: 1 tab(s) orally once a day    Vital Signs Last 24 Hrs  T(C): 36.9 (26 Dec 2022 12:45), Max: 36.9 (26 Dec 2022 12:45)  T(F): 98.4 (26 Dec 2022 12:45), Max: 98.4 (26 Dec 2022 12:45)  HR: 85 (26 Dec 2022 12:45) (77 - 85)  BP: 122/82 (26 Dec 2022 12:45) (122/82 - 151/77)  RR: 18 (26 Dec 2022 12:45) (18 - 18)  SpO2: 98% (25 Dec 2022 21:00) (98% - 98%)    Parameters below as of 25 Dec 2022 21:00  Patient On (Oxygen Delivery Method): room air    Parameters below as of 24 Dec 2022 04:25  Patient On (Oxygen Delivery Method): room air    Physical Exam:  General: more awake today / sitting in bed no distress   Head: Normocephalic atraumatic.  Neck: Supple  Heart: Regular rate and rhythm; S1, S2; No murmurs.  Lungs: Clear to auscultation bilaterally.  Abdomen: Soft, nontender, nondistended.  Extremities: No edema in upper or lower extremities.  Neuro: AAOx2, No focal deficits.    LABS:       137  |  102  |  49<H>  ----------------------------<  93  4.9   |  25  |  2.0<H>    Ca    8.8      26 Dec 2022 06:24  Mg     1.9         TPro  6.0  /  Alb  3.2<L>  /  TBili  0.3  /  DBili  x   /  AST  24  /  ALT  13  /  AlkPhos  94                              11.4   8.26  )-----------( 236      ( 26 Dec 2022 06:24 )             36.1                           10.7   11.37 )-----------( 220      ( 25 Dec 2022 06:58 )             33.5        139  |  103  |  60<H>  ----------------------------<  75  4.5   |  23  |  2.0<H>    Ca    8.6      25 Dec 2022 06:58    Mg     1.7         TPro  5.8<L>  /  Alb  3.1<L>  /  TBili  0.3  /  DBili  x   /  AST  24  /  ALT  12  /  AlkPhos  92                          11.5   8.06  )-----------( 195      ( 23 Dec 2022 07:45 )             37.3         138  |  102  |  77  -----------------------<  131  4.6   |  24  |  3.0    Ca    9.0      23 Dec 2022 07:45    Mg     1.8         TPro  5.7<L>  /  Alb  2.8<L>  /  TBili  0.2  /  DBili  x   /  AST  31  /  ALT  15  /  AlkPhos  100                            12.9   8.03  )-----------( 197      ( 22 Dec 2022 04:30 )             40.4     12    140  |  105  |  92  ----------------------------<  73  5.4  |  19  |  3.7    Ca    8.8      22 Dec 2022 04:30  Phos  3.2     12-  Mg     2.2         TPro  6.3  /  Alb  3.4<L>  /  TBili  0.4  /  DBili  x   /  AST  35  /  ALT  16  /  AlkPhos  115                          11.7   8.94  )-----------( 211      ( 21 Dec 2022 07:39 )             36.4         139  |  103  |  90<HH>  ----------------------------<  82  5.0   |  22  |  3.7<H>    Ca    9.2      21 Dec 2022 07:39  Phos  3.2     -  Mg     2.1         TPro  6.2  /  Alb  3.4<L>  /  TBili  0.3  /  DBili  x   /  AST  34  /  ALT  15  /  AlkPhos  111      Urinalysis Basic (12-15 @ 00:00)  Color: Colorless  Appearance: Clear  S.009  Ketone: Negative  Bili: Negative  Urobili: <2 mg/dL   Protein: 30 mg/dL  Nitrite: Negative   Leuk Esterase: Negative  RBC: 1  WBC: 0   Sq Epi:   Non Sq Epi: 0  Bacteria: Negative    Microbiology:  Culture - Urine (collected 12-15 @ 00:00)  Source: .Urine Clean Catch (Midstream)  Final Report ( @ 09:15):  <10,000 CFU/mL Normal Urogenital Rand    Radiology: reviewed     MEDICATIONS  (STANDING):  allopurinol 100 milliGRAM(s) Oral daily  apixaban 2.5 milliGRAM(s) Oral two times a day  atorvastatin 20 milliGRAM(s) Oral at bedtime  calcium acetate 1334 milliGRAM(s) Oral three times a day with meals  dronabinol 2.5 milliGRAM(s) Oral two times a day  isosorbide   mononitrate ER Tablet (IMDUR) 60 milliGRAM(s) Oral daily  lactated ringers. 1000 milliLiter(s) (50 mL/Hr) IV Continuous <Continuous>  latanoprost 0.005% Ophthalmic Solution 1 Drop(s) Both EYES <User Schedule>  metoprolol succinate ER 25 milliGRAM(s) Oral daily  mirtazapine 7.5 milliGRAM(s) Oral at bedtime  pantoprazole    Tablet 40 milliGRAM(s) Oral before breakfast  senna 2 Tablet(s) Oral at bedtime  timolol 0.5% Solution 1 Drop(s) Both EYES two times a day    MEDICATIONS  (PRN):  acetaminophen     Tablet .. 650 milliGRAM(s) Oral every 6 hours PRN Temp greater or equal to 38C (100.4F)  meclizine 12.5 milliGRAM(s) Oral three times a day PRN Dizziness

## 2022-12-26 NOTE — PROGRESS NOTE ADULT - ASSESSMENT
87F presents to the ED from NH for elevated creatinine subsequently found to have suspected UTI in ED admitted to medicine for OMERO on CKD5.     #Failure To Thrive   #Functional Quadraplegia / Bedbound   - Low BMI   - Refuses to eat and has been deconditioning since went to [facility] per Daughter and continues to deteriorate   - Daughter wants pt to return home NOT STR. She lives downstairs from her house  - Pt requires FULL CARE   - Palliative Care Consult for GOC Discussion and CODE STATUS as FULL CODE.   - f/u SLP recommending; Puree or Minced Diet w/ thin Liquids     #OMERO on CKD5   -SCr 4.7 on admission   -baseline SCr noted to be 3.0  per [facility],   -daughter recalls recent Bactrim exposure 2 weeks prior.   -Has been refusing po fluids in NH  -Gentle IVF LR 50cc/hr (not eating)   -Renal bladder ultrasound reviewed   -Hold home lasix   -avoid nephrotoxic agents   -Keep carlos to monitor urine output   -f/u spep, upep, serum immunofixation  -f/u nephrology in clinic     #CKD 5  -patient follows with outpatient neophrologist   -continue calcium acetate binders     #Hyperkalemia   -K 5.5 on admission, now 5.2 s/p Lokelma   -Low K diet   -f/u am     #Suspected UTI  #Dysuria  -UA+ in ed, culture pending   -patient w/culture +ecoli 12/6 s/p course of bactrim prior to admission   -completed Aztreonam for now ([facility] Allergy)     #HFrEF  -last echo in chart from September '22   -s/p AICD   -No evidence of fluid overload on exam   -Hold home lasix dose in the setting of OMERO and poor oral intake    #Anxiety/Depression   -Remeron 7.5mg qHS   -Daughter reports that the patient has been withdrawn of recent, consider outpatient Psych follow up on discharge.  -Patient endorses emotionally traumatic episode in NH. Please get  to investigate.     #HLD  -continue lipitor 20mg qHS home dose.     #HTN   -Continue Metoprolol    #Hx of Provoked DVT/PE   -on eliquis 2.5mg BID     #Constipation   -senna 2tabs qHS     Activity: IAT   Diet: Low K Low phos   DVT ppx: home eliquis   GI ppx: protonix daily     Pending: Palliative / Hospice f/u Tuesday w/ family and medical team for dispo - pt is improving (eating and stool up w/ PT)   Family: Daughter and Son updated by Resident. Palliative to touch base tomorrow as above   Called PMD Dr Misael Sloan at [phone] he informed me that Son is not the care taker of Mrs Marrufo and he is unable to authorize FMLA.   Dispo: Home w/ Hospice Next week. (Daughter wants pt to go HOME and pt is FULL ASSIST w/ Functional Quadraplegia-FTT )

## 2022-12-27 LAB
% ALBUMIN: 46.9 % — SIGNIFICANT CHANGE UP
% ALPHA 1: 6.4 % — SIGNIFICANT CHANGE UP
% ALPHA 2: 14.3 % — SIGNIFICANT CHANGE UP
% BETA: 10.9 % — SIGNIFICANT CHANGE UP
% GAMMA: 21.5 % — SIGNIFICANT CHANGE UP
ALBUMIN SERPL ELPH-MCNC: 2.9 G/DL — LOW (ref 3.6–5.5)
ALBUMIN/GLOB SERPL ELPH: 0.9 RATIO — SIGNIFICANT CHANGE UP
ALPHA1 GLOB SERPL ELPH-MCNC: 0.4 G/DL — SIGNIFICANT CHANGE UP (ref 0.1–0.4)
ALPHA2 GLOB SERPL ELPH-MCNC: 0.9 G/DL — SIGNIFICANT CHANGE UP (ref 0.5–1)
B-GLOBULIN SERPL ELPH-MCNC: 0.7 G/DL — SIGNIFICANT CHANGE UP (ref 0.5–1)
GAMMA GLOBULIN: 1.3 G/DL — SIGNIFICANT CHANGE UP (ref 0.6–1.6)
INTERPRETATION SERPL IFE-IMP: SIGNIFICANT CHANGE UP
PROT PATTERN SERPL ELPH-IMP: SIGNIFICANT CHANGE UP

## 2022-12-27 PROCEDURE — 99233 SBSQ HOSP IP/OBS HIGH 50: CPT

## 2022-12-27 RX ADMIN — APIXABAN 2.5 MILLIGRAM(S): 2.5 TABLET, FILM COATED ORAL at 05:52

## 2022-12-27 RX ADMIN — Medication 1334 MILLIGRAM(S): at 08:01

## 2022-12-27 RX ADMIN — MIRTAZAPINE 7.5 MILLIGRAM(S): 45 TABLET, ORALLY DISINTEGRATING ORAL at 21:55

## 2022-12-27 RX ADMIN — Medication 25 MILLIGRAM(S): at 05:51

## 2022-12-27 RX ADMIN — PANTOPRAZOLE SODIUM 40 MILLIGRAM(S): 20 TABLET, DELAYED RELEASE ORAL at 05:51

## 2022-12-27 RX ADMIN — LATANOPROST 1 DROP(S): 0.05 SOLUTION/ DROPS OPHTHALMIC; TOPICAL at 21:54

## 2022-12-27 RX ADMIN — SODIUM CHLORIDE 50 MILLILITER(S): 9 INJECTION, SOLUTION INTRAVENOUS at 08:02

## 2022-12-27 RX ADMIN — Medication 1 DROP(S): at 05:51

## 2022-12-27 RX ADMIN — Medication 2.5 MILLIGRAM(S): at 05:56

## 2022-12-27 RX ADMIN — SENNA PLUS 2 TABLET(S): 8.6 TABLET ORAL at 21:55

## 2022-12-27 RX ADMIN — ATORVASTATIN CALCIUM 20 MILLIGRAM(S): 80 TABLET, FILM COATED ORAL at 21:54

## 2022-12-27 RX ADMIN — LATANOPROST 1 DROP(S): 0.05 SOLUTION/ DROPS OPHTHALMIC; TOPICAL at 05:52

## 2022-12-27 NOTE — HOSPICE CARE NOTE - CONVESATION DETAILS
Follow up call placed to patient's daughter Leona to further review hospice services.  Reviewed home hospice services versus home care services. Provided detailed explanation of both services. Leona requesting further discussion with the medical team prior to making a decisions. Please contact her at [phone]. Hospice to remain available.

## 2022-12-27 NOTE — PROGRESS NOTE ADULT - ASSESSMENT
87F presents to the ED from NH for elevated creatinine subsequently found to have suspected UTI in ED admitted to medicine for OMERO on CKD5.     #Failure To Thrive   #Functional Quadraplegia / Bedbound   - Low BMI   - Refuses to eat and has been deconditioning since went to [facility] per Daughter and continues to deteriorate   - Daughter wants pt to return home NOT STR. She lives downstairs from her house  - Pt requires FULL CARE   - Palliative Care Consult for GOC Discussion and CODE STATUS as FULL CODE.   - f/u SLP recommending; Puree or Minced Diet w/ thin Liquids     #OMERO on CKD5   -SCr 4.7 on admission   -baseline SCr noted to be 3.0  per [facility],   -daughter recalls recent Bactrim exposure 2 weeks prior.   -Has been refusing po fluids in NH  -Gentle IVF LR 50cc/hr (not eating)   -Renal bladder ultrasound reviewed   -Hold home lasix   -avoid nephrotoxic agents   -Keep carlos to monitor urine output   -f/u spep, upep, serum immunofixation  -f/u nephrology in clinic     #CKD 5  -patient follows with outpatient neophrologist   -continue calcium acetate binders     #Hyperkalemia   -K 5.5 on admission, now 5.2 s/p Lokelma   -Low K diet   -f/u am     #Suspected UTI  #Dysuria  -UA+ in ed, culture pending   -patient w/culture +ecoli 12/6 s/p course of bactrim prior to admission   -completed Aztreonam for now (PCN Allergy)     #HFrEF  -last echo in chart from September '22   -s/p AICD   -No evidence of fluid overload on exam   -Hold home lasix dose in the setting of OMREO and poor oral intake    #Anxiety/Depression   -Remeron 7.5mg qHS   -Daughter reports that the patient has been withdrawn of recent, consider outpatient Psych follow up on discharge.  -Patient endorses emotionally traumatic episode in NH. Please get  to investigate.     #HLD  -continue lipitor 20mg qHS home dose.     #HTN   -Continue Metoprolol    #Hx of Provoked DVT/PE   -on eliquis 2.5mg BID     #Constipation   -senna 2tabs qHS     Activity: IAT   Diet: Low K Low phos   DVT ppx: home eliquis   GI ppx: protonix daily     Pending: Palliative / Hospice f/u Tuesday w/ family and medical team for dispo - pt is improving (eating and stool up w/ PT)   Family: Daughter and Son updated by Resident. Palliative to touch base tomorrow as above   Called PMD Dr Misael Sloan at [phone] he informed me that Son is not the care taker of Mrs Marrufo and he is unable to authorize FMLA.   Dispo: Home w/ Hospice Pending (Daughter wants pt to go HOME and pt is FULL ASSIST w/ Functional Quadraplegia-FTT )       87F presents to the ED from NH for elevated creatinine subsequently found to have suspected UTI in ED admitted to medicine for OMERO on CKD5.     #Failure To Thrive   #Functional Quadraplegia / Bedbound   - Low BMI   - Refuses to eat and has been deconditioning since went to STR per Daughter and continues to deteriorate   - Daughter wants pt to return home NOT STR. She lives downstairs from her house  - Pt requires FULL CARE   - Palliative Care Consult for GOC Discussion and CODE STATUS as FULL CODE.   - f/u SLP recommending; Puree or Minced Diet w/ thin Liquids     #OMERO on CKD5   -SCr 4.7 on admission   -baseline SCr noted to be 3.0  per [facility],   -daughter recalls recent Bactrim exposure 2 weeks prior.   -Has been refusing po fluids in NH  -Gentle IVF LR 50cc/hr (not eating)   -Renal bladder ultrasound reviewed   -Hold home lasix   -avoid nephrotoxic agents   -Keep carlos to monitor urine output   -f/u spep, upep, serum immunofixation  -f/u nephrology in clinic     #CKD 5  -patient follows with outpatient neophrologist   -continue calcium acetate binders     #Hyperkalemia   -K 5.5 on admission, now 5.2 s/p Lokelma   -Low K diet   -f/u am     #Suspected UTI  #Dysuria  -UA+ in ed, culture pending   -patient w/culture +ecoli 12/6 s/p course of bactrim prior to admission   -completed Aztreonam for now (PCN Allergy)     #HFrEF  -last echo in chart from September '22   -s/p AICD   -No evidence of fluid overload on exam   -Hold home lasix dose in the setting of OMERO and poor oral intake    #Anxiety/Depression   -Remeron 7.5mg qHS   -Daughter reports that the patient has been withdrawn of recent, consider outpatient Psych follow up on discharge.  -Patient endorses emotionally traumatic episode in NH. Please get  to investigate.     #HLD  -continue lipitor 20mg qHS home dose.     #HTN   -Continue Metoprolol    #Hx of Provoked DVT/PE   -on eliquis 2.5mg BID     #Constipation   -senna 2tabs qHS     Activity: IAT   Diet: Low K Low phos   DVT ppx: home eliquis   GI ppx: protonix daily     Pending: Palliative / Hospice f/u Tuesday w/ family and medical team for dispo - pt is improving (eating and stool up w/ PT)   Family: Daughter and Son updated by Resident. Palliative to touch base tomorrow as above   Called PMD Dr Misael Sloan at [phone] he informed me that Son is not the care taker of Mrs Marrufo and he is unable to authorize FMLA.   Dispo: Home w/ Hospice  / I DISCUSSED CASE IN DETAIL WITH DAUGHTER BHAVYA AGAIN 12/27/22. SHE WANTS HOME HOSPICE AND ALSO HOME PT IF POSSIBLE. / ANTICIPATED

## 2022-12-27 NOTE — PROGRESS NOTE ADULT - SUBJECTIVE AND OBJECTIVE BOX
Medical Student Note    Patient Information:    RACHEL SUMMERS / 85y / Female / MRN#:[id number]    Hospital Day: 7 day     HPI:  86 yo F with pmhx of CHF, s/p pacemaker, DVT (on Eloquis), RA, gout, HTN, CKD was sent in to the ED by PMD Dr. Celeste for evaluation of worsening dyspnea on exertion which was associated with Left calf pain for few days. She also complained of dysuria and left sided flank pain x 2 days which was localized, non radiating, moderate in intensity and worsened with movement. Dyspnea and lower extremity swelling is exacerbated by walking. She denied any hematuria, fever, chills, paresthesias, nausea, vomiting, headache, dizziness.    Patient was received hypertensive in ER. Blood work showed mild anaemia, hyperkalemia, elevated creatinine and reduced GFR (unchanged from last blood work). Troponin were negative, BNP was elevated >22k. UA and COVID-19 was negative. CT abdomen and pelvis and CT chest showed Bibasilar peripheral opacities likely on the basis of subsegmental atelectasis. Patient is being admitted for monitoring and r/o volume overload secondary to kidney dysfunction versus CHF.      Attd: pt has long standing RA and chr body aches, jacob of left arm and back; she has tried RA meds in past, but they do not agree w/ her body; pt does NOT like to take strong narcotics; she says lidoderm helps; she is agreeable to tylenol and a muscle relaxant; she also has gout; pain is worse w/ mvmt, so seems musculoskel; pt was c/o SOB, but does not seem too vol overload; she denies palpitations or CP; she is usually not on home O2; she lives in a house w/ her son and her dtr has a day care on the second floor; she does not want to go to SNF, but she would like home PT for her unsteady gait; she is [employer]; she can eat and drink; she is well oriented and knows her med hx fairly well     Interval History:  Patient seen and examined at bedside. No acute events overnight. She reported excruciating back pain last night but this morning she is feeling improvement. She has been off the O2 and is 98% on room air. She had a bowel movement last night. She feels that she might be ready to go home tomorrow.      Past Medical History:  DVT, lower extremity  Rheumatoid arthritis  Diastolic heart failure  Atrial fibrillation  Diverticulitis  Gout  Hypertension  Pacemaker  Chronic kidney disease    Past Surgical History:  History of hysterectomy  History of tonsillectomy  History of appendectomy  Artificial pacemaker    Allergies:  Keflex (Swelling)    Medications:  PRN:  aluminum hydroxide/magnesium hydroxide/simethicone Suspension 30 milliLiter(s) Oral every 4 hours PRN Dyspepsia  morphine   Solution 2 milliGRAM(s) Oral every 8 hours PRN Severe Pain (7 - 10)    Home:  apixaban 2.5 mg oral tablet: 1 tab(s) orally 2 times a day  atorvastatin 40 mg oral tablet: 1 tab(s) orally once a day  calcium acetate 667 mg oral tablet: 1 tab(s) orally once a day  folic acid 1 mg oral tablet: 1 tab(s) orally once a day  furosemide 20 mg oral tablet: 1 tab(s) orally Tuesday, Thursday, Saturday,   latanoprost 0.005% ophthalmic solution: 1 drop(s) to each affected eye once a day (in the evening)  Metoprolol Succinate ER 25 mg oral tablet, extended release: 1 tab(s) orally once a day  sertraline 50 mg oral tablet: 1 tab(s) orally once a day    Vital Signs Last 24 Hrs  T(C): 37.2 (27 Dec 2022 12:41), Max: 37.2 (27 Dec 2022 12:41)  T(F): 99 (27 Dec 2022 12:41), Max: 99 (27 Dec 2022 12:41)  HR: 76 (27 Dec 2022 12:41) (76 - 82)  BP: 119/61 (27 Dec 2022 12:41) (119/61 - 132/80)  RR: 17 (27 Dec 2022 12:41) (17 - 19)  SpO2: 99% (27 Dec 2022 05:30) (99% - 99%)    Parameters below as of 25 Dec 2022 21:00  Patient On (Oxygen Delivery Method): room air    Parameters below as of 24 Dec 2022 04:25  Patient On (Oxygen Delivery Method): room air    Physical Exam:  General: more awake today / sitting in bed no distress   Head: Normocephalic atraumatic.  Neck: Supple  Heart: Regular rate and rhythm; S1, S2; No murmurs.  Lungs: Clear to auscultation bilaterally.  Abdomen: Soft, nontender, nondistended.  Extremities: No edema in upper or lower extremities.  Neuro: AAOx2, No focal deficits.    LABS:         137  |  102  |  49<H>  ----------------------------<  93  4.9   |  25  |  2.0<H>    Ca    8.8      26 Dec 2022 06:24  Mg     1.9         TPro  6.0  /  Alb  3.2<L>  /  TBili  0.3  /  DBili  x   /  AST  24  /  ALT  13  /  AlkPhos  94                              11.4   8.26  )-----------( 236      ( 26 Dec 2022 06:24 )             36.1                           10.7   11.37 )-----------( 220      ( 25 Dec 2022 06:58 )             33.5        139  |  103  |  60<H>  ----------------------------<  75  4.5   |  23  |  2.0<H>    Ca    8.6      25 Dec 2022 06:58    Mg     1.7         TPro  5.8<L>  /  Alb  3.1<L>  /  TBili  0.3  /  DBili  x   /  AST  24  /  ALT  12  /  AlkPhos  92                          11.5   8.06  )-----------( 195      ( 23 Dec 2022 07:45 )             37.3         138  |  102  |  77  -----------------------<  131  4.6   |  24  |  3.0    Ca    9.0      23 Dec 2022 07:45    Mg     1.8         TPro  5.7<L>  /  Alb  2.8<L>  /  TBili  0.2  /  DBili  x   /  AST  31  /  ALT  15  /  AlkPhos  100                            12.9   8.03  )-----------( 197      ( 22 Dec 2022 04:30 )             40.4         140  |  105  |  92  ----------------------------<  73  5.4  |  19  |  3.7    Ca    8.8      22 Dec 2022 04:30  Phos  3.2     12-  Mg     2.2         TPro  6.3  /  Alb  3.4<L>  /  TBili  0.4  /  DBili  x   /  AST  35  /  ALT  16  /  AlkPhos  115                          11.7   8.94  )-----------( 211      ( 21 Dec 2022 07:39 )             36.4         139  |  103  |  90<HH>  ----------------------------<  82  5.0   |  22  |  3.7<H>    Ca    9.2      21 Dec 2022 07:39  Phos  3.2     12-  Mg     2.1         TPro  6.2  /  Alb  3.4<L>  /  TBili  0.3  /  DBili  x   /  AST  34  /  ALT  15  /  AlkPhos  111      Urinalysis Basic (12-15 @ 00:00)  Color: Colorless  Appearance: Clear  S.009  Ketone: Negative  Bili: Negative  Urobili: <2 mg/dL   Protein: 30 mg/dL  Nitrite: Negative   Leuk Esterase: Negative  RBC: 1  WBC: 0   Sq Epi:   Non Sq Epi: 0  Bacteria: Negative    Microbiology:  Culture - Urine (collected 12-15 @ 00:00)  Source: .Urine Clean Catch (Midstream)  Final Report ( @ 09:15):  <10,000 CFU/mL Normal Urogenital Rand    Radiology: reviewed     MEDICATIONS  (STANDING):  allopurinol 100 milliGRAM(s) Oral daily  apixaban 2.5 milliGRAM(s) Oral two times a day  atorvastatin 20 milliGRAM(s) Oral at bedtime  calcium acetate 1334 milliGRAM(s) Oral three times a day with meals  dronabinol 2.5 milliGRAM(s) Oral two times a day  isosorbide   mononitrate ER Tablet (IMDUR) 60 milliGRAM(s) Oral daily  lactated ringers. 1000 milliLiter(s) (50 mL/Hr) IV Continuous <Continuous>  latanoprost 0.005% Ophthalmic Solution 1 Drop(s) Both EYES <User Schedule>  metoprolol succinate ER 25 milliGRAM(s) Oral daily  mirtazapine 7.5 milliGRAM(s) Oral at bedtime  pantoprazole    Tablet 40 milliGRAM(s) Oral before breakfast  senna 2 Tablet(s) Oral at bedtime  timolol 0.5% Solution 1 Drop(s) Both EYES two times a day    MEDICATIONS  (PRN):  acetaminophen     Tablet .. 650 milliGRAM(s) Oral every 6 hours PRN Temp greater or equal to 38C (100.4F)  meclizine 12.5 milliGRAM(s) Oral three times a day PRN Dizziness

## 2022-12-28 PROCEDURE — 99232 SBSQ HOSP IP/OBS MODERATE 35: CPT

## 2022-12-28 PROCEDURE — 99233 SBSQ HOSP IP/OBS HIGH 50: CPT

## 2022-12-28 RX ORDER — TRAMADOL HYDROCHLORIDE 50 MG/1
25 TABLET ORAL DAILY
Refills: 0 | Status: DISCONTINUED | OUTPATIENT
Start: 2022-12-28 | End: 2022-12-29

## 2022-12-28 RX ORDER — ACETAMINOPHEN 500 MG
650 TABLET ORAL EVERY 6 HOURS
Refills: 0 | Status: DISCONTINUED | OUTPATIENT
Start: 2022-12-28 | End: 2022-12-29

## 2022-12-28 RX ORDER — ACETAMINOPHEN 500 MG
650 TABLET ORAL EVERY 6 HOURS
Refills: 0 | Status: DISCONTINUED | OUTPATIENT
Start: 2022-12-28 | End: 2022-12-28

## 2022-12-28 RX ADMIN — Medication 1334 MILLIGRAM(S): at 18:39

## 2022-12-28 RX ADMIN — Medication 1 DROP(S): at 18:40

## 2022-12-28 RX ADMIN — SENNA PLUS 2 TABLET(S): 8.6 TABLET ORAL at 21:06

## 2022-12-28 RX ADMIN — Medication 650 MILLIGRAM(S): at 15:10

## 2022-12-28 RX ADMIN — ATORVASTATIN CALCIUM 20 MILLIGRAM(S): 80 TABLET, FILM COATED ORAL at 21:07

## 2022-12-28 RX ADMIN — LATANOPROST 1 DROP(S): 0.05 SOLUTION/ DROPS OPHTHALMIC; TOPICAL at 21:07

## 2022-12-28 RX ADMIN — Medication 100 MILLIGRAM(S): at 15:14

## 2022-12-28 RX ADMIN — Medication 650 MILLIGRAM(S): at 21:05

## 2022-12-28 RX ADMIN — Medication 2.5 MILLIGRAM(S): at 18:39

## 2022-12-28 RX ADMIN — MIRTAZAPINE 7.5 MILLIGRAM(S): 45 TABLET, ORALLY DISINTEGRATING ORAL at 21:09

## 2022-12-28 RX ADMIN — TRAMADOL HYDROCHLORIDE 25 MILLIGRAM(S): 50 TABLET ORAL at 16:40

## 2022-12-28 RX ADMIN — Medication 650 MILLIGRAM(S): at 15:40

## 2022-12-28 RX ADMIN — Medication 1334 MILLIGRAM(S): at 13:18

## 2022-12-28 RX ADMIN — ISOSORBIDE MONONITRATE 60 MILLIGRAM(S): 60 TABLET, EXTENDED RELEASE ORAL at 15:17

## 2022-12-28 RX ADMIN — TRAMADOL HYDROCHLORIDE 25 MILLIGRAM(S): 50 TABLET ORAL at 17:10

## 2022-12-28 RX ADMIN — APIXABAN 2.5 MILLIGRAM(S): 2.5 TABLET, FILM COATED ORAL at 18:39

## 2022-12-28 NOTE — PROGRESS NOTE ADULT - ASSESSMENT
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 Patient was being treated for UTI at NH. Patient has been having recent symptoms of depression as well. Palliative consulted for [facility].    Patient's daughter interested in rehab at this time  Will consider hospice again in the future if patient does not improve  We will sign off    MEDD (morphine equivalent daily dose): 7.5      See Recs below.    Please call [phone] with questions or concerns 24/7.       Discussed with primary MD.

## 2022-12-28 NOTE — PROGRESS NOTE ADULT - PROBLEM SELECTOR PLAN 4
Per family, patient has been more withdrawn lately.  - continue Remeron   - primary team will consider behavioral health consult for recommendations
Per family, patient has been more withdrawn lately.  -continue Remeron   - primary team will consider behavioral health consult for recommendations

## 2022-12-28 NOTE — CHART NOTE - NSCHARTNOTESELECT_GEN_ALL_CORE
Hospital Bed necessity/Event Note
Event Note
Palliative Care SW Initial Assessment/Event Note

## 2022-12-28 NOTE — PROGRESS NOTE ADULT - PROBLEM SELECTOR PLAN 5
Poor functional status. Unable to participate fully in PT.  -continue treatment per primary team/pulmonary  -PT as appropriate   -family and patient want to get her home--> possibly with hospice
Poor functional status. Unable to participate fully in PT.  -continue treatment per primary team/pulmonary  -PT as appropriate   -family and patient want to get her home--> possibly with hospice

## 2022-12-28 NOTE — PROGRESS NOTE ADULT - PROBLEM SELECTOR PLAN 1
minimal appetite  continue Marinol 2.5 mg PO BID  no feeding tubes per patients wishes - family in agreement  hospice appropriate given poor oral intake/FTT  may continue IVF for now - not yet CMO
continue Marinol 2.5 mg PO BID  no feeding tubes per patients wishes - family in agreement  improved PO intake per report  Ongoing medical management

## 2022-12-28 NOTE — PROGRESS NOTE ADULT - CONVERSATION DETAILS
Spoke with patient and family at bedside. Dtr explains that patient became increasingly depressed and debilitated following hospitalization in October followed by prolonged STR stay. Patient not answering all questions and somewhat apathetic/deferring to children during the encounter. Spoke with patient and family about option for artificial feeding (trial of NGT or PEG) as well as the option to continue with comfort feeding. Explained that if the patient does not start to eat more, she will likely continue to decline and have re-admissions. Explained hospice care at home with comfort feeding at an alternative to ongoing aggressive medical management. Patient reports she does NOT want feeding tubes and just wants to go home. She refuses to return to a NH as she did not like it there. Family is interested in hospice consult and patient agreeable to considering it.     Spoke also about DNR/DNI. All three children endorse that the patient has made is clear she does not want to be kept alive on MV and would like to place a DNR/DNI order today. MOL completed. Plan to place hospice consult- spoke with case mgmt.
Called and spoke with Leona over the phone. Palliative care reintroduced. She noted the patient appears to be improving clinically. She had discussed hospice vs rehab with the case management team and hospice. She wishes for the patient to get rehab to see if she can ambulate again, and wishes to not pursue hospice at this time.     She will consider hospice again based on how the patient does with rehab.  All questions answered.

## 2022-12-28 NOTE — PROGRESS NOTE ADULT - SUBJECTIVE AND OBJECTIVE BOX
Patient is a 87y old  Female who presents with a chief complaint of OMERO on CKD (28 Dec 2022 14:11)      Patient seen and examined at bedside.  Patient reports back pain, denies any chest pain, no shortness of breath     ALLERGIES:  cephalosporins (Angioedema)  Keflex (Swelling)    MEDICATIONS:  acetaminophen     Tablet .. 650 milliGRAM(s) Oral every 6 hours PRN  acetaminophen     Tablet .. 650 milliGRAM(s) Oral every 6 hours PRN  allopurinol 100 milliGRAM(s) Oral daily  apixaban 2.5 milliGRAM(s) Oral two times a day  atorvastatin 20 milliGRAM(s) Oral at bedtime  calcium acetate 1334 milliGRAM(s) Oral three times a day with meals  dronabinol 2.5 milliGRAM(s) Oral two times a day  isosorbide   mononitrate ER Tablet (IMDUR) 60 milliGRAM(s) Oral daily  lactated ringers. 1000 milliLiter(s) IV Continuous <Continuous>  latanoprost 0.005% Ophthalmic Solution 1 Drop(s) Both EYES <User Schedule>  meclizine 12.5 milliGRAM(s) Oral three times a day PRN  metoprolol succinate ER 25 milliGRAM(s) Oral daily  mirtazapine 7.5 milliGRAM(s) Oral at bedtime  pantoprazole    Tablet 40 milliGRAM(s) Oral before breakfast  senna 2 Tablet(s) Oral at bedtime  timolol 0.5% Solution 1 Drop(s) Both EYES two times a day  traMADol 25 milliGRAM(s) Oral daily PRN    Vital Signs Last 24 Hrs  T(F): 98.4 (28 Dec 2022 12:42), Max: 98.8 (28 Dec 2022 05:24)  HR: 82 (28 Dec 2022 12:42) (72 - 85)  BP: 152/79 (28 Dec 2022 12:42) (118/66 - 155/75)  RR: 17 (28 Dec 2022 12:42) (17 - 18)  SpO2: 96% (28 Dec 2022 12:42) (96% - 99%)  I&O's Summary      PHYSICAL EXAM:  General: NAD, Alert  ENT: MMM  Neck: Supple, No JVD  Lungs: Clear to auscultation bilaterally, no crackles   Cardio: RRR, S1/S2, 2/6 systolic murmur   Abdomen: Soft, Nontender, Nondistended; Bowel sounds present  Extremities: No cyanosis, No edema    LABS:                        11.4   8.26  )-----------( 236      ( 26 Dec 2022 06:24 )             36.1     12-26    137  |  102  |  49  ----------------------------<  93  4.9   |  25  |  2.0    Ca    8.8      26 Dec 2022 06:24  Mg     1.9     12-26    TPro  6.0  /  Alb  3.2  /  TBili  0.3  /  DBili  x   /  AST  24  /  ALT  13  /  AlkPhos  94  12-26                                    COVID-19 PCR: NotDetemeena (12-19-22 @ 20:22)      RADIOLOGY & ADDITIONAL TESTS:    Care Discussed with Consultants/Other Providers:

## 2022-12-28 NOTE — PROGRESS NOTE ADULT - PROBLEM SELECTOR PLAN 6
DNR/DNI  No feeding tubes  Continue  medical management  Hospice previously consulted - Plan for rehab on discharge  Will sign off
DNR/DNI  No feeding tubes  Continue other medical management for now  Hospice consult for home hospice   Will be available for GOC discussions as appropriate

## 2022-12-28 NOTE — PROGRESS NOTE ADULT - TIME BILLING
direct pt care and interdisciplinary rounds
direct patient care and discussion with patient's daughter over the phone re: GOC and hospice

## 2022-12-28 NOTE — PROGRESS NOTE ADULT - TREATMENT GUIDELINE COMMENT
DNR/DNI  No feeding tubes   Continue current med mgmt   Hospice consult - possibly at home
-DNR/DNI  -Rehab on discharge

## 2022-12-28 NOTE — CHART NOTE - NSCHARTNOTEFT_GEN_A_CORE
Gave daughter update this am that patient is not eating/ drinking/ becoming functionally quadriplegic. She understands her mother's condition and knows that this is not a safe discharge home. At this time does not want feeding tubes/ NG, patient at bedside also refusing. Daughter would like to discuss DNR/DNI status with family but states that her mother would not have wanted chest compressions or to be ventilated. aware that palliative care is following and is awaiting call.
PALLIATIVE MEDICINE INTERDISCIPLINARY TEAM NOTE    Provider:  [  x ]Social Work   [   ]          [ x  ] Initial visit [   ] Follow up    Family or contact name / phone #   Met with: [  x ] Patient:  patient was observed to be resting comfortably  [   ] Family  [   ] Other:    Primary Language: [  x ] English [   ] Other*:                      *Interpretation provided by:    SUPPORT DIAGNOSES            (Check all that apply)  [   ] Psychosocial spiritual assessment (PSSA)  [   ] EOL issues  [   ] Cultural / spiritual concerns  [x   ] Pain / suffering  [   ] Dementia / AMS  [   ] Other:  [   ] AD issues  [   ] Grief / loss / sadness  [   ] Discharge issues  [  x ] Distress / coping    PSYCHOSOCIAL ASSESSMENT OF PATIENT         (Check all that apply)  [ x  ] Initial Assessment            [   ] Reassessment          [   ] Not Applicable this visit    Pain/suffering acuity:  patient appeared to be resting comfortably  [   ] None to mild (0-3)           [   ] Moderate (4-6)        [   ] High (7-10)    Mental Status:  [   ] Alert/oriented (x3)          [   ] Confused/Altered(x2/x1)         [ x  ] Non-resp    Functional status:  [   ] Independent w ADLs      [   ] Needs Assistance             [ x  ] Bedbound/Full Care    Coping:  unable to assess  [   ] Coping well                     [   ] Coping w/difficulty            [   ] Poor coping    Support system:  [ x  ] Strong                              [   ] Adequate                        [   ] Inadequate      Past history and medications for:     [ ] Anxiety       [ ] Depression    [ ] Sleep disorders     SPIRITUAL ASSESSMENT  Adventist/Spiritual practice: ___________________________    Role of organized Yarsani:  [   ] Important                     [   ] Some (fam tradition, cultural)               [   ] None    Effects on medical care:  [   ] Yes, _____________________________________                         [   ] None    Cultural/Scientologist need:  [   ] Yes, _____________________________________                         [   ] None    Refer to Pastoral Care:  [   ] Yes           [   ] No, not at this time    SERVICE PROVIDED  [   ]PSSA                                                                             [   ]Discharge support / facilitation  [ x  ]AD / goals of care counseling                                  [   ]EOL / death / bereavement counseling  [  x ]Counseling / support                                                [   ] Family meeting  [   ]Prayer / sacrament / ritual                                      [   ] Referral   [   ]Other                                                                       NOTE and Plan of Care (PoC):    Patient is an 87 year old female.  Patient was admitted from [facility] on 12/[facility]/22, dx:  acute on chronic kidney failure, hyperkalemia, acute UTI.  Patient has PMH of HTN, provoked DVT/PE on eliquis, HFrEF, s/p AICD, RA, CKD jena 5.      Patient was observed to be resting comfortably.  T/C to daughter, Leona Marrufo:  reviewed role of Palliative Care and CO.  Provided information regarding options for Hospice care.  Daughter discussed that she is waiting form medical update before making a decision regarding Hospice.
Patient seen at bedside  No acute issues  Case management notified that discharge planning is needed
Spoke to daughter Leona [phone].   Gave medical update that patient is medically stable and anticipated for discharge tomorrow home. She is pending PT eval today.
Its medically necessary for this patient to receive a hospital bed. This patient requires positioning of the body in ways not feasible with ordinary bed in order to alleviate pain. Pillows and wedges have been considered and ruled out. Pt has high potential for skin breakdown intergluteal fold redness is incontinent and needs to be positioned off loading q 2hours.

## 2022-12-28 NOTE — PROGRESS NOTE ADULT - SUBJECTIVE AND OBJECTIVE BOX
OVERNIGHT EVENTS:    SUBJECTIVE / INTERVAL HPI: Patient seen and examined at bedside.     VITAL SIGNS:  Vital Signs Last 24 Hrs  T(C): 36.9 (28 Dec 2022 12:42), Max: 37.1 (28 Dec 2022 05:24)  T(F): 98.4 (28 Dec 2022 12:42), Max: 98.8 (28 Dec 2022 05:24)  HR: 82 (28 Dec 2022 12:42) (72 - 85)  BP: 152/79 (28 Dec 2022 12:42) (118/66 - 155/75)  BP(mean): --  RR: 17 (28 Dec 2022 12:42) (17 - 18)  SpO2: 96% (28 Dec 2022 12:42) (96% - 99%)        PHYSICAL EXAM:    General: Well developed, well nourished, no acute distress  HEENT: NC/AT; PERRL, anicteric sclera; MMM  Neck: supple  Cardiovascular: +S1/S2, RRR, no murmurs, rubs, gallops  Respiratory: CTA B/L; no W/R/R  Gastrointestinal: soft, NT/ND; +BSx4  Extremities: WWP; no edema, clubbing or cyanosis  Vascular: 2+ radial, DP/PT pulses B/L  Neurological: AAOx3; no focal deficits    MEDICATIONS:  MEDICATIONS  (STANDING):  allopurinol 100 milliGRAM(s) Oral daily  apixaban 2.5 milliGRAM(s) Oral two times a day  atorvastatin 20 milliGRAM(s) Oral at bedtime  calcium acetate 1334 milliGRAM(s) Oral three times a day with meals  dronabinol 2.5 milliGRAM(s) Oral two times a day  isosorbide   mononitrate ER Tablet (IMDUR) 60 milliGRAM(s) Oral daily  lactated ringers. 1000 milliLiter(s) (50 mL/Hr) IV Continuous <Continuous>  latanoprost 0.005% Ophthalmic Solution 1 Drop(s) Both EYES <User Schedule>  metoprolol succinate ER 25 milliGRAM(s) Oral daily  mirtazapine 7.5 milliGRAM(s) Oral at bedtime  pantoprazole    Tablet 40 milliGRAM(s) Oral before breakfast  senna 2 Tablet(s) Oral at bedtime  timolol 0.5% Solution 1 Drop(s) Both EYES two times a day    MEDICATIONS  (PRN):  acetaminophen     Tablet .. 650 milliGRAM(s) Oral every 6 hours PRN Mild Pain (1 - 3), Moderate Pain (4 - 6)  acetaminophen     Tablet .. 650 milliGRAM(s) Oral every 6 hours PRN Temp greater or equal to 38C (100.4F)  meclizine 12.5 milliGRAM(s) Oral three times a day PRN Dizziness  traMADol 25 milliGRAM(s) Oral daily PRN Severe Pain (7 - 10)      ALLERGIES:  Allergies    cephalosporins (Angioedema)  Keflex (Swelling)    Intolerances        LABS:              CAPILLARY BLOOD GLUCOSE          RADIOLOGY & ADDITIONAL TESTS: Reviewed.    PLAN:  OVERNIGHT EVENTS: CLARA O/N endorsing lower back pain.    SUBJECTIVE / INTERVAL HPI: Patient seen and examined at bedside.     VITAL SIGNS:  Vital Signs Last 24 Hrs  T(C): 36.9 (28 Dec 2022 12:42), Max: 37.1 (28 Dec 2022 05:24)  T(F): 98.4 (28 Dec 2022 12:42), Max: 98.8 (28 Dec 2022 05:24)  HR: 82 (28 Dec 2022 12:42) (72 - 85)  BP: 152/79 (28 Dec 2022 12:42) (118/66 - 155/75)  BP(mean): --  RR: 17 (28 Dec 2022 12:42) (17 - 18)  SpO2: 96% (28 Dec 2022 12:42) (96% - 99%)        PHYSICAL EXAM:    General: Thin elderly woman, awake and alert  HEENT: NC/AT; PERRL, anicteric sclera;   Neck: supple  Cardiovascular: +S1/S2, RRR, no murmurs, rubs, gallops  Respiratory: CTA B/L;  Gastrointestinal: soft, NT/ND; +BSx4  Extremities: WWP; no edema, clubbing or cyanosis  Neurological: AAOx3; no focal deficits    MEDICATIONS:  MEDICATIONS  (STANDING):  allopurinol 100 milliGRAM(s) Oral daily  apixaban 2.5 milliGRAM(s) Oral two times a day  atorvastatin 20 milliGRAM(s) Oral at bedtime  calcium acetate 1334 milliGRAM(s) Oral three times a day with meals  dronabinol 2.5 milliGRAM(s) Oral two times a day  isosorbide   mononitrate ER Tablet (IMDUR) 60 milliGRAM(s) Oral daily  lactated ringers. 1000 milliLiter(s) (50 mL/Hr) IV Continuous <Continuous>  latanoprost 0.005% Ophthalmic Solution 1 Drop(s) Both EYES <User Schedule>  metoprolol succinate ER 25 milliGRAM(s) Oral daily  mirtazapine 7.5 milliGRAM(s) Oral at bedtime  pantoprazole    Tablet 40 milliGRAM(s) Oral before breakfast  senna 2 Tablet(s) Oral at bedtime  timolol 0.5% Solution 1 Drop(s) Both EYES two times a day    MEDICATIONS  (PRN):  acetaminophen     Tablet .. 650 milliGRAM(s) Oral every 6 hours PRN Mild Pain (1 - 3), Moderate Pain (4 - 6)  acetaminophen     Tablet .. 650 milliGRAM(s) Oral every 6 hours PRN Temp greater or equal to 38C (100.4F)  meclizine 12.5 milliGRAM(s) Oral three times a day PRN Dizziness  traMADol 25 milliGRAM(s) Oral daily PRN Severe Pain (7 - 10)      ALLERGIES:  Allergies    cephalosporins (Angioedema)  Keflex (Swelling)    Intolerances        LABS:              CAPILLARY BLOOD GLUCOSE          RADIOLOGY & ADDITIONAL TESTS: Reviewed.    PLAN:

## 2022-12-28 NOTE — PROGRESS NOTE ADULT - SUBJECTIVE AND OBJECTIVE BOX
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 Patient was being treated for UTI at NH. Patient has been having recent symptoms of depression as well. Palliative consulted for GOC.     INTERVAL EVENTS:  -patient seen at bedside  -patient endorses no pain or discomfort but states she has to go to the bathroom      ADVANCE DIRECTIVES:    MOLST  [ ]  Living Will  [ ]   DECISION MAKER(s):  [ X] Health Care Proxy(s)  [ ] Surrogate(s)  [ ] Guardian           Name(s): Phone Number(s): Leona (dtr)    BASELINE (I)ADL(s) (prior to admission):  [place]: [ ]Total  [ ] Moderate [X ]Dependent  Palliative Performance Status Version 2:       30  %    [email or web address]/files/[email or web address]_[email or web address]    Allergies  cephalosporins (Angioedema)  Keflex (Swelling)    MEDICATIONS  (STANDING):  allopurinol 100 milliGRAM(s) Oral daily  apixaban 2.5 milliGRAM(s) Oral two times a day  atorvastatin 20 milliGRAM(s) Oral at bedtime  calcium acetate 1334 milliGRAM(s) Oral three times a day with meals  dronabinol 2.5 milliGRAM(s) Oral two times a day  isosorbide   mononitrate ER Tablet (IMDUR) 60 milliGRAM(s) Oral daily  lactated ringers. 1000 milliLiter(s) (50 mL/Hr) IV Continuous <Continuous>  latanoprost 0.005% Ophthalmic Solution 1 Drop(s) Both EYES <User Schedule>  metoprolol succinate ER 25 milliGRAM(s) Oral daily  mirtazapine 7.5 milliGRAM(s) Oral at bedtime  pantoprazole    Tablet 40 milliGRAM(s) Oral before breakfast  senna 2 Tablet(s) Oral at bedtime  timolol 0.5% Solution 1 Drop(s) Both EYES two times a day    MEDICATIONS  (PRN):  acetaminophen     Tablet .. 650 milliGRAM(s) Oral every 6 hours PRN Temp greater or equal to 38C (100.4F)  acetaminophen     Tablet .. 650 milliGRAM(s) Oral every 6 hours PRN Mild Pain (1 - 3), Moderate Pain (4 - 6)  meclizine 12.5 milliGRAM(s) Oral three times a day PRN Dizziness  traMADol 25 milliGRAM(s) Oral daily PRN Severe Pain (7 - 10)      PRESENT SYMPTOMS:  Pain: [ ]yes [X ]no  QOL impact -   Location -                    Aggravating factors -  Quality -  Radiation -  Timing-  Severity (0-10 scale):  Minimal acceptable level (0-10 scale):     Dyspnea:                           [ ]Mild [ ]Moderate [ ]Severe - denies  Anxiety:                             [ ]Mild [ ]Moderate [ ]Severe  Fatigue:                             [ ]Mild [ ]Moderate [ ]Severe  Nausea:                             [ ]Mild [ ]Moderate [ ]Severe  Loss of appetite:              [ ]Mild [ x]Moderate [ ]Severe   Constipation:                    [ ]Mild [ ]Moderate [ ]Severe    Other Symptoms:  [X ]All other review of systems negative     Palliative Performance Status Version 2:      30   %    [facility]://[facility]/files/news/palliative_performance_scale_[email or web address]    PHYSICAL EXAM:  Vital Signs Last 24 Hrs  T(C): 36.9 (28 Dec 2022 12:42), Max: 37.1 (28 Dec 2022 05:24)  T(F): 98.4 (28 Dec 2022 12:42), Max: 98.8 (28 Dec 2022 05:24)  HR: 82 (28 Dec 2022 12:42) (72 - 85)  BP: 152/79 (28 Dec 2022 12:42) (118/66 - 155/75)  BP(mean): --  RR: 17 (28 Dec 2022 12:42) (17 - 18)  SpO2: 96% (28 Dec 2022 12:42) (96% - 99%)    GENERAL:  [ X ]Alert  [X ]Oriented x  3 [ ]Lethargic  [ ]Cachexia  [ ]Unarousable  [ X]Verbal  [ ]Non-Verbal  Behavioral:   [ ] Anxiety  [ ] Delirium [ ] Agitation [ X] calm  HEENT:  [ X]Normal   [ ]Dry mouth   [ ]ET Tube/Trach  [ ]Oral lesions  PULMONARY:   [ ]Clear [X ] No Tachypnea  [ ]Audible excessive secretions   [ ]Rhonchi        [ ]Right [ ]Left [ ]Bilateral  [ ]Crackles        [ ]Right [ ]Left [ ]Bilateral  [ ]Wheezing     [ ]Right [ ]Left [ ]Bilateral  [ ]Diminished breath sounds [ ]right [ ]left [ ]bilateral  GASTROINTESTINAL:  [X ]Soft  [ ]Distended   [ ]+BS  [ ]Non tender [ ]Tender  [ ]PEG [ ]OGT/ NGT  Last BM:   GENITOURINARY:  [ X ]Normal [ ] Incontinent   [ ]Oliguria/Anuria   [ ]Morgan  MUSCULOSKELETAL:   [ ]Normal   [ ]Weakness  [X ]Bed/Wheelchair bound [ ]Edema  NEUROLOGIC:   [ X ]No focal deficits  [ ]Cognitive impairment  [ ]Dysphagia [ ]Dysarthria [ ]Paresis [ ]Other   SKIN:   [X ]Normal    [ ]Rash  [ ]Pressure ulcer(s)       Present on admission [ ]y [ ]n      LABS:  previous labs reviewed      RADIOLOGY & ADDITIONAL STUDIES:  < from: Xray Hand 2 Views, Left (12.22.22 @ 08:32) >    IMPRESSION:  No acute osseous abnormality seen. Moderate-severe degenerative changes.    < end of copied text >    < from: 12 Lead ECG (12.19.22 @ 21:37) >  Ventricular Rate 87 BPM    Atrial Rate 87 BPM    P-R Interval 166 ms    QRS Duration 138 ms    Q-T Interval 414 ms    QTC Calculation(Bazett) 498 ms    P Axis 19 degrees    R Axis -28 degrees    T Axis 162 degrees    Diagnosis Line Normal sinus rhythm  Left bundle branch block  Abnormal ECG    < end of copied text >        REFERRALS:   [ ]Chaplaincy  [X Hospice  [ ]Child Life  [ ]Social Work  [ ]Case management [ ]Holistic Therapy

## 2022-12-28 NOTE — PROGRESS NOTE ADULT - ASSESSMENT
87F presents to the ED from NH for elevated creatinine subsequently found to have suspected UTI in ED admitted to medicine for OMERO on CKD5.     #Failure To Thrive   #Functional Quadraplegia / Bedbound   - Low BMI   - Refuses to eat and has been deconditioning since went to STR per Daughter and continues to deteriorate   - Daughter wants pt to return home NOT STR. She lives downstairs from her house  - Pt requires FULL CARE   - Palliative Care Consult for GOC Discussion and CODE STATUS as FULL CODE.   - f/u SLP recommending; Puree or Minced Diet w/ thin Liquids     #OMERO on CKD5   -SCr 4.7 on admission   -baseline SCr noted to be 3.0  per [facility],   -daughter recalls recent Bactrim exposure 2 weeks prior.   -Has been refusing po fluids in NH  -Gentle IVF LR 50cc/hr (not eating)   -Renal bladder ultrasound reviewed   -Hold home lasix   -avoid nephrotoxic agents   -Keep carlos to monitor urine output   -f/u spep, upep, serum immunofixation  -f/u nephrology in clinic     #CKD 5  -patient follows with outpatient neophrologist   -continue calcium acetate binders     #Hyperkalemia   -K 5.5 on admission, now 5.2 s/p Lokelma   -Low K diet   -f/u am     #Suspected UTI  #Dysuria  -UA+ in ed, culture pending   -patient w/culture +ecoli 12/6 s/p course of bactrim prior to admission   -completed Aztreonam for now (PCN Allergy)     #HFrEF  -last echo in chart from September '22   -s/p AICD   -No evidence of fluid overload on exam   -Hold home lasix dose in the setting of OMERO and poor oral intake    #Anxiety/Depression   -Remeron 7.5mg qHS   -Daughter reports that the patient has been withdrawn of recent, consider outpatient Psych follow up on discharge.  -Patient endorses emotionally traumatic episode in NH. Please get  to investigate.     #HLD  -continue lipitor 20mg qHS home dose.     #HTN   -Continue Metoprolol    #Hx of Provoked DVT/PE   -on eliquis 2.5mg BID     #Constipation   -senna 2tabs qHS     Activity: IAT   Diet: Low K Low phos   DVT ppx: home eliquis   GI ppx: protonix daily

## 2022-12-28 NOTE — PROGRESS NOTE ADULT - ASSESSMENT
87F presents to the ED from NH for elevated creatinine subsequently found to have suspected UTI in ED admitted to medicine for OMERO on CKD5.     #Failure To Thrive   #Functional Quadraplegia / Bedbound   - Low BMI   - Refuses to eat and has been deconditioning since went to STR per Daughter and continues to deteriorate   - Daughter wants pt to return home NOT STR. She lives downstairs from her house  - Pt requires FULL CARE   - Palliative Care Consult for GOC Discussion and CODE STATUS as FULL CODE.   - f/u SLP recommending; Puree or Minced Diet w/ thin Liquids     #OMERO on CKD5   -SCr 4.7 on admission   -baseline SCr noted to be 3.0  per [facility],   -daughter recalls recent Bactrim exposure 2 weeks prior.   -Has been refusing po fluids in NH  -Gentle IVF LR 50cc/hr (not eating)   -Renal bladder ultrasound reviewed   -Hold home lasix   -avoid nephrotoxic agents   -Keep carlos to monitor urine output   -f/u spep, upep, serum immunofixation  -f/u nephrology in clinic     #CKD 5  -patient follows with outpatient neophrologist   -continue calcium acetate binders     #Hyperkalemia   -K 5.5 on admission, now 5.2 s/p Lokelma   -Low K diet   -f/u am     #Suspected UTI  #Dysuria  -UA+ in ed, culture pending   -patient w/culture +ecoli 12/6 s/p course of bactrim prior to admission   -completed Aztreonam for now (PCN Allergy)     #HFrEF  -last echo in chart from September '22   -s/p AICD   -No evidence of fluid overload on exam   -Hold home lasix dose in the setting of OMERO and poor oral intake    #Anxiety/Depression   -Remeron 7.5mg qHS   -Daughter reports that the patient has been withdrawn of recent, consider outpatient Psych follow up on discharge.  -Patient endorses emotionally traumatic episode in NH. Please get  to investigate.     #HLD  -continue lipitor 20mg qHS home dose.     #HTN   -Continue Metoprolol    #Hx of Provoked DVT/PE   -on eliquis 2.5mg BID     #Constipation   -senna 2tabs qHS     Activity: IAT   Diet: Low K Low phos   DVT ppx: home eliquis   GI ppx: protonix daily   Pending: plan dc home when hospital bed is delivered

## 2022-12-29 VITALS
DIASTOLIC BLOOD PRESSURE: 64 MMHG | RESPIRATION RATE: 20 BRPM | TEMPERATURE: 97 F | SYSTOLIC BLOOD PRESSURE: 123 MMHG | HEART RATE: 75 BPM

## 2022-12-29 PROCEDURE — 99239 HOSP IP/OBS DSCHRG MGMT >30: CPT

## 2022-12-29 RX ORDER — OXYCODONE HYDROCHLORIDE 5 MG/1
1 TABLET ORAL
Qty: 16 | Refills: 0
Start: 2022-12-29 | End: 2023-01-01

## 2022-12-29 RX ORDER — OXYCODONE HYDROCHLORIDE 5 MG/1
1 TABLET ORAL
Qty: 0 | Refills: 0 | DISCHARGE

## 2022-12-29 RX ORDER — FUROSEMIDE 40 MG
1 TABLET ORAL
Qty: 0 | Refills: 0 | DISCHARGE

## 2022-12-29 RX ADMIN — PANTOPRAZOLE SODIUM 40 MILLIGRAM(S): 20 TABLET, DELAYED RELEASE ORAL at 08:41

## 2022-12-29 RX ADMIN — APIXABAN 2.5 MILLIGRAM(S): 2.5 TABLET, FILM COATED ORAL at 06:07

## 2022-12-29 RX ADMIN — TRAMADOL HYDROCHLORIDE 25 MILLIGRAM(S): 50 TABLET ORAL at 06:36

## 2022-12-29 RX ADMIN — LATANOPROST 1 DROP(S): 0.05 SOLUTION/ DROPS OPHTHALMIC; TOPICAL at 06:07

## 2022-12-29 RX ADMIN — Medication 1334 MILLIGRAM(S): at 13:02

## 2022-12-29 RX ADMIN — Medication 650 MILLIGRAM(S): at 06:07

## 2022-12-29 RX ADMIN — Medication 25 MILLIGRAM(S): at 06:07

## 2022-12-29 RX ADMIN — Medication 1 DROP(S): at 06:08

## 2022-12-29 RX ADMIN — Medication 2.5 MILLIGRAM(S): at 06:06

## 2022-12-29 RX ADMIN — ISOSORBIDE MONONITRATE 60 MILLIGRAM(S): 60 TABLET, EXTENDED RELEASE ORAL at 13:01

## 2022-12-29 RX ADMIN — Medication 1334 MILLIGRAM(S): at 08:44

## 2022-12-29 RX ADMIN — Medication 100 MILLIGRAM(S): at 13:02

## 2022-12-29 NOTE — PROGRESS NOTE ADULT - REASON FOR ADMISSION
OMERO on CKD

## 2022-12-29 NOTE — PROGRESS NOTE ADULT - SUBJECTIVE AND OBJECTIVE BOX
[facility] day:     HPI:       Hospital Course:       Objective:   T(C): 36.1 (12-29-22 @ 05:16), Max: 36.9 (12-28-22 @ 12:42)  HR: 81 (12-29-22 @ 05:16) (80 - 82)  BP: 113/67 (12-29-22 @ 05:16) (113/67 - 152/79)  RR: 19 (12-29-22 @ 05:16) (17 - 19)  SpO2: 98% (12-29-22 @ 05:16) (96% - 99%)    PHYSICAL EXAM  GENERAL: Frail looking, comfortable in bed   PULMONARY/CARDIOVASCULAR: CTA, no ronchi or wheezes. Normal S1S2 with RRR, no murmurs  GASTROINTESTINAL: Soft, non-tender, non-distended, no guarding.  NEUROLOGIC/MUSCULOSKELETAL: AOx1-2    MEDICATIONS  acetaminophen     Tablet .. 650 milliGRAM(s) Oral every 6 hours PRN  acetaminophen     Tablet .. 650 milliGRAM(s) Oral every 6 hours PRN  allopurinol 100 milliGRAM(s) Oral daily  apixaban 2.5 milliGRAM(s) Oral two times a day  atorvastatin 20 milliGRAM(s) Oral at bedtime  calcium acetate 1334 milliGRAM(s) Oral three times a day with meals  dronabinol 2.5 milliGRAM(s) Oral two times a day  isosorbide   mononitrate ER Tablet (IMDUR) 60 milliGRAM(s) Oral daily  latanoprost 0.005% Ophthalmic Solution 1 Drop(s) Both EYES <User Schedule>  meclizine 12.5 milliGRAM(s) Oral three times a day PRN  metoprolol succinate ER 25 milliGRAM(s) Oral daily  mirtazapine 7.5 milliGRAM(s) Oral at bedtime  pantoprazole    Tablet 40 milliGRAM(s) Oral before breakfast  senna 2 Tablet(s) Oral at bedtime  timolol 0.5% Solution 1 Drop(s) Both EYES two times a day  traMADol 25 milliGRAM(s) Oral daily PRN      Labs:      Assessment and plan: [facility] day: 10    HPI:       Hospital Course:       Objective:   T(C): 36.1 (12-29-22 @ 05:16), Max: 36.9 (12-28-22 @ 12:42)  HR: 81 (12-29-22 @ 05:16) (80 - 82)  BP: 113/67 (12-29-22 @ 05:16) (113/67 - 152/79)  RR: 19 (12-29-22 @ 05:16) (17 - 19)  SpO2: 98% (12-29-22 @ 05:16) (96% - 99%)    PHYSICAL EXAM  GENERAL: Frail looking, comfortable in bed   PULMONARY/CARDIOVASCULAR: CTA, no ronchi or wheezes. Normal S1S2 with RRR, no murmurs  GASTROINTESTINAL: Soft, non-tender, non-distended, no guarding.  NEUROLOGIC/MUSCULOSKELETAL: AOx1-2    MEDICATIONS  acetaminophen     Tablet .. 650 milliGRAM(s) Oral every 6 hours PRN  acetaminophen     Tablet .. 650 milliGRAM(s) Oral every 6 hours PRN  allopurinol 100 milliGRAM(s) Oral daily  apixaban 2.5 milliGRAM(s) Oral two times a day  atorvastatin 20 milliGRAM(s) Oral at bedtime  calcium acetate 1334 milliGRAM(s) Oral three times a day with meals  dronabinol 2.5 milliGRAM(s) Oral two times a day  isosorbide   mononitrate ER Tablet (IMDUR) 60 milliGRAM(s) Oral daily  latanoprost 0.005% Ophthalmic Solution 1 Drop(s) Both EYES <User Schedule>  meclizine 12.5 milliGRAM(s) Oral three times a day PRN  metoprolol succinate ER 25 milliGRAM(s) Oral daily  mirtazapine 7.5 milliGRAM(s) Oral at bedtime  pantoprazole    Tablet 40 milliGRAM(s) Oral before breakfast  senna 2 Tablet(s) Oral at bedtime  timolol 0.5% Solution 1 Drop(s) Both EYES two times a day  traMADol 25 milliGRAM(s) Oral daily PRN      Labs:  No labs     Assessment and plan:   [facility] day: 10    HPI:   87F presents to the ED from NH for elevated creatinine subsequently found to have suspected UTI in ED admitted to medicine for OMERO on CKD5.     Objective:   T(C): 36.1 (12-29-22 @ 05:16), Max: 36.9 (12-28-22 @ 12:42)  HR: 81 (12-29-22 @ 05:16) (80 - 82)  BP: 113/67 (12-29-22 @ 05:16) (113/67 - 152/79)  RR: 19 (12-29-22 @ 05:16) (17 - 19)  SpO2: 98% (12-29-22 @ 05:16) (96% - 99%)    PHYSICAL EXAM  GENERAL: Frail looking, comfortable in bed   PULMONARY/CARDIOVASCULAR: CTA, no ronchi or wheezes. Normal S1S2 with RRR, no murmurs  GASTROINTESTINAL: Soft, non-tender, non-distended, no guarding.  NEUROLOGIC/MUSCULOSKELETAL: AOx1-2    MEDICATIONS  acetaminophen     Tablet .. 650 milliGRAM(s) Oral every 6 hours PRN  acetaminophen     Tablet .. 650 milliGRAM(s) Oral every 6 hours PRN  allopurinol 100 milliGRAM(s) Oral daily  apixaban 2.5 milliGRAM(s) Oral two times a day  atorvastatin 20 milliGRAM(s) Oral at bedtime  calcium acetate 1334 milliGRAM(s) Oral three times a day with meals  dronabinol 2.5 milliGRAM(s) Oral two times a day  isosorbide   mononitrate ER Tablet (IMDUR) 60 milliGRAM(s) Oral daily  latanoprost 0.005% Ophthalmic Solution 1 Drop(s) Both EYES <User Schedule>  meclizine 12.5 milliGRAM(s) Oral three times a day PRN  metoprolol succinate ER 25 milliGRAM(s) Oral daily  mirtazapine 7.5 milliGRAM(s) Oral at bedtime  pantoprazole    Tablet 40 milliGRAM(s) Oral before breakfast  senna 2 Tablet(s) Oral at bedtime  timolol 0.5% Solution 1 Drop(s) Both EYES two times a day  traMADol 25 milliGRAM(s) Oral daily PRN      Labs:  No labs     Assessment and plan:  87F presents to the ED from NH for elevated creatinine subsequently found to have suspected UTI in ED admitted to medicine for OMERO on CKD5.     #Failure To Thrive   #Functional Quadraplegia / Bedbound   - Low BMI   - Refuses to eat and has been deconditioning since went to STR per Daughter and continues to deteriorate   - Daughter wants pt to return home NOT STR  - s&S on board >>>>  Puree or Minced Diet w/ thin Liquids   - Will go home once hospital bed is there     #OMERO on CKD5   -SCr 4.7 on admission   -Has been refusing po fluids in NH  -Gentle IVF LR 50cc/hr (not eating)   -Renal bladder ultrasound reviewed   -Hold home lasix   -f/u nephrology in clinic     #Suspected UTI  #Dysuria  -UA+ in ed, culture pending   -patient w/culture +ecoli 12/6 s/p course of bactrim prior to admission   -completed Aztreonam    #HFrEF  -last echo in chart from September '22   -s/p AICD   -No evidence of fluid overload on exam   -Hold home lasix dose in the setting of OMERO and poor oral intake    #Anxiety/Depression   -Remeron 7.5mg qHS   -Daughter reports that the patient has been withdrawn of recent, consider outpatient Psych follow up on discharge.  -Patient endorses emotionally traumatic episode in NH. Please get  to investigate.     #HLD  -continue lipitor 20mg qHS home dose.     #HTN   -Continue Metoprolol    #Hx of Provoked DVT/PE   -on eliquis 2.5mg BID     DVT ppx:  eliquis   GI ppx: protonix daily     Pending: Dc home (with hospital bed)

## 2022-12-29 NOTE — PROGRESS NOTE ADULT - PROVIDER SPECIALTY LIST ADULT
Internal Medicine
Hospitalist
Internal Medicine
Internal Medicine
Palliative Care
Hospitalist
Palliative Care

## 2023-01-05 DIAGNOSIS — R62.7 ADULT FAILURE TO THRIVE: ICD-10-CM

## 2023-01-05 DIAGNOSIS — R53.2 FUNCTIONAL QUADRIPLEGIA: ICD-10-CM

## 2023-01-05 DIAGNOSIS — I13.2 HYPERTENSIVE HEART AND CHRONIC KIDNEY DISEASE WITH HEART FAILURE AND WITH STAGE 5 CHRONIC KIDNEY DISEASE, OR END STAGE RENAL DISEASE: ICD-10-CM

## 2023-01-05 DIAGNOSIS — Z79.01 LONG TERM (CURRENT) USE OF ANTICOAGULANTS: ICD-10-CM

## 2023-01-05 DIAGNOSIS — Z88.0 ALLERGY STATUS TO PENICILLIN: ICD-10-CM

## 2023-01-05 DIAGNOSIS — F41.9 ANXIETY DISORDER, UNSPECIFIED: ICD-10-CM

## 2023-01-05 DIAGNOSIS — Z66 DO NOT RESUSCITATE: ICD-10-CM

## 2023-01-05 DIAGNOSIS — N17.9 ACUTE KIDNEY FAILURE, UNSPECIFIED: ICD-10-CM

## 2023-01-05 DIAGNOSIS — E87.5 HYPERKALEMIA: ICD-10-CM

## 2023-01-05 DIAGNOSIS — Z20.822 CONTACT WITH AND (SUSPECTED) EXPOSURE TO COVID-19: ICD-10-CM

## 2023-01-05 DIAGNOSIS — E78.5 HYPERLIPIDEMIA, UNSPECIFIED: ICD-10-CM

## 2023-01-05 DIAGNOSIS — Z86.718 PERSONAL HISTORY OF OTHER VENOUS THROMBOSIS AND EMBOLISM: ICD-10-CM

## 2023-01-05 DIAGNOSIS — I50.22 CHRONIC SYSTOLIC (CONGESTIVE) HEART FAILURE: ICD-10-CM

## 2023-01-05 DIAGNOSIS — K59.00 CONSTIPATION, UNSPECIFIED: ICD-10-CM

## 2023-01-05 DIAGNOSIS — F32.A DEPRESSION, UNSPECIFIED: ICD-10-CM

## 2023-01-05 DIAGNOSIS — M10.9 GOUT, UNSPECIFIED: ICD-10-CM

## 2023-01-05 DIAGNOSIS — N39.0 URINARY TRACT INFECTION, SITE NOT SPECIFIED: ICD-10-CM

## 2023-01-05 DIAGNOSIS — Z51.5 ENCOUNTER FOR PALLIATIVE CARE: ICD-10-CM

## 2023-01-05 DIAGNOSIS — N18.5 CHRONIC KIDNEY DISEASE, STAGE 5: ICD-10-CM

## 2023-01-11 ENCOUNTER — LABORATORY RESULT (OUTPATIENT)
Age: 88
End: 2023-01-11

## 2023-01-17 ENCOUNTER — APPOINTMENT (OUTPATIENT)
Dept: CARDIOLOGY | Facility: CLINIC | Age: 88
End: 2023-01-17
Payer: MEDICARE

## 2023-01-17 VITALS
BODY MASS INDEX: 19.31 KG/M2 | SYSTOLIC BLOOD PRESSURE: 122 MMHG | DIASTOLIC BLOOD PRESSURE: 70 MMHG | HEIGHT: 63 IN | WEIGHT: 109 LBS | HEART RATE: 67 BPM | TEMPERATURE: 96.7 F

## 2023-01-17 PROCEDURE — 93000 ELECTROCARDIOGRAM COMPLETE: CPT

## 2023-01-17 PROCEDURE — 99214 OFFICE O/P EST MOD 30 MIN: CPT

## 2023-01-17 RX ORDER — HYDRALAZINE HYDROCHLORIDE 10 MG/1
10 TABLET ORAL
Qty: 90 | Refills: 6 | Status: DISCONTINUED | COMMUNITY
Start: 2022-09-29 | End: 2023-01-17

## 2023-01-17 RX ORDER — SERTRALINE 25 MG/1
25 TABLET, FILM COATED ORAL DAILY
Qty: 90 | Refills: 3 | Status: ACTIVE | COMMUNITY
Start: 2023-01-17

## 2023-01-17 RX ORDER — MECLIZINE HYDROCHLORIDE 12.5 MG/1
12.5 TABLET ORAL DAILY
Qty: 20 | Refills: 3 | Status: ACTIVE | COMMUNITY

## 2023-01-17 RX ORDER — NIFEDIPINE 60 MG
60 TABLET, EXTENDED RELEASE ORAL
Refills: 0 | Status: DISCONTINUED | COMMUNITY
End: 2023-01-17

## 2023-01-17 RX ORDER — SERTRALINE HYDROCHLORIDE 50 MG/1
50 TABLET, FILM COATED ORAL DAILY
Qty: 30 | Refills: 3 | Status: DISCONTINUED | COMMUNITY
Start: 2022-02-06 | End: 2023-01-17

## 2023-01-17 NOTE — HISTORY OF PRESENT ILLNESS
[FreeTextEntry1] : The patient was readmitted to [facility] for worsening renal failure . The patient had her diuretic held ..She has lost 14 pounds since August . The patient has placed on Hospice as well. She has not had SOB . She is no on HD. Blood work fdrom Dr. marion showed continued pre renal azotemia

## 2023-01-17 NOTE — CARDIOLOGY SUMMARY
[___] : [unfilled] [LVEF ___%] : LVEF [unfilled]% [___] : [unfilled] [de-identified] : 12- Atrial paced rhyth IVCD LBBB morphology \par 3- NSR LBBB \par 8- Atrial paced  LBBB \par 09/29/2022: SR, LBBB vs ventricular pacing . \par 1- NSR LBBB [de-identified] : 3-7-2022 EF 25-30% moderate MR Mod TR \par 9- EF 40-45% Severe concnetric LVH

## 2023-01-17 NOTE — ASSESSMENT
[FreeTextEntry1] : The patient has had worsening renal function . Medications were adjusted . She has lost a lot of weight since August . The patient's BP has been low normal and it seems she was restarted on some omedications which were stopped in the hosptial. . She needs daily weight . Would not increase diruetics unless weight is more than 5 pounds from baseline . She has nmode LV dysufnction and is known ot have a nonischemic CM . The patient has had a CRT ICD but the left sided lead was epicardic and is not functioning .

## 2023-01-19 ENCOUNTER — APPOINTMENT (OUTPATIENT)
Dept: CARDIOLOGY | Facility: CLINIC | Age: 88
End: 2023-01-19

## 2023-01-23 NOTE — PATIENT PROFILE ADULT - FUNCTIONAL ASSESSMENT - DAILY ACTIVITY SCORE.
Refill request received via interface.  Medication(s) refilled per protocol.   Detail Level: Generalized General Sunscreen Counseling: I recommended a broad spectrum sunscreen with a SPF of 30 or higher.  I explained that SPF 30 sunscreens block approximately 97 percent of the sun's harmful rays.  Sunscreens should be applied at least 15 minutes prior to expected sun exposure and then every 2 hours after that as long as sun exposure continues. If swimming or exercising sunscreen should be reapplied every 45 minutes to an hour after getting wet or sweating.  One ounce, or the equivalent of a shot glass full of sunscreen, is adequate to protect the skin not covered by a bathing suit. I also recommended a lip balm with a sunscreen as well. Sun protective clothing can be used in lieu of sunscreen but must be worn the entire time you are exposed to the sun's rays. 19

## 2023-01-31 NOTE — PATIENT PROFILE ADULT - BRAND OF FIRST COVID-19 BOOSTER
Assessment  - 73M, from [facility], w/ PMH of afib (on eliquis), CKD (s/p R AVF creation), HTN, BPH, CAD (s/p PCI), AS (s/p TAVR), VT (s/p Medtronic BiV ICD), and HFrEF, sent in from the nursing home for generalized weakness and dizziness upon awakening this morning. Admitted to ICU for hypotension, likely in setting on GIB vs septic    Plan  ====Neuro====  #Baseline mentation: AAOx3   - at baseline    ====CVS====  #Hypotention  - h/o hypertension  - Ddx: hemorrhagic vs septic  - WBC 23  - hgb 6.9 (~8, 1/23)  - BP 86/48  - s/p 500cc  - c/w vanc and cefepime  - f/u UA, Ucx, Bcx, vanc trough q4 dose  - GI consulted  - monitor CBC q6h  - BP monitoring    #HFrEF  - on metoprolol and lasix at home   - hold both due to hypotension    #Afib  - on eliquis, amio, and metoprolol at home  - hold eliquis for GIB  - hold amio and metoprolol for hypotension and NPO    #h/o VF, on amiodarone (s/p BiV AICD)  - hold amio while NPO  - resume when PO    #HTN  - on hydralazine, metoprolol at home  - hold both due to hypotension    ====Pulm====  #No active issues    ====GI====  #Diet: NPO  #Bowel regimen: none    #likely GIB  - see hypotension    ====Renal====  #CKD, s/p IVF creation on 1/19  - hold home meds  - monitor e-lytes    #BPH  - hold flomax and finasteride for now    ====ID====  #leukocytosis  - see hypotension    ====Heme/Onc====  #Anemia  - h/o anemia of renal disease  - see hypotension    ====Endo====  #bG goal: 140-180  - ISS while NPO    ====Skin/lines====  #peripheral: none  #CVC: RI (1/30) for vascular access  #montiel: none    ====Ppx====  #DVT: hold for bleeding  #GI: PPI bid   Assessment  - 73M, from [facility], w/ PMH of afib (on eliquis), CKD (s/p R AVF creation), HTN, BPH, CAD (s/p PCI), AS (s/p TAVR), VT (s/p Medtronic BiV ICD), and HFrEF, sent in from the nursing home for generalized weakness and dizziness upon awakening this morning. Admitted to ICU for hypotension, likely in setting on GIB vs septic    Plan  ====Neuro====  #Baseline mentation: AAOx3   - at baseline    ====CVS====  #probable hemorrhagic shock 2/2 GIB vs septic shock (less likely)    - h/o HTN, presented with hypotension s/p 500cc bolus and leukocytosis  - hgb 6.9 (~8, 1/23)  - status post empiric vanc x 1 dose  - MRSA pending  - continue cefepime 1g q24 hours  - UA neg, UCx normal julia   - Bcx pending  - GI following  - monitor CBC q6h  - continue hemodynamic monitoring    #HFrEF  - on metoprolol and lasix at home   - hold both due to hypotension  -continue hemodynamic monitoring    #Afib  - on eliquis, amio, and metoprolol at home  - hold eliquis for GIB  - hold amio and metoprolol for hypotension and NPO    #h/o VF, on amiodarone (s/p BiV AICD)  - hold amio while NPO  - resume when PO    #HTN  - on hydralazine, metoprolol at home  - hold both due to hypotension    ====Pulm====  #No active issues    ====GI====  #Diet: may advance to clears per GI  #Bowel regimen: none    #likely GIB  - hgb 6.9 (~8, 1/23)  - transfused two units PRBCs overnight  - no dark/tarry stools today, light brown stool reported  - GI following, plan for EGD on 2/3/2023  - monitor CBC q6h  - continue hemodynamic monitoring  - advance diet as above  - holding AC    ====Renal====  #CKD V, s/p AV fistula creation on 1/19  - not yet on dialysis, makes some urine  - hold home meds  - monitor volume status, electrolytes    #BPH  - hold flomax and finasteride for now    ====ID====  # ?sepsis  -  h/o HTN, presented with hypotension s/p 500cc bolus and leukocytosis  - in setting of probably GIB, Hgb 6.9 (~8, 1/23) with dark tarry stools x weeks  - status post empiric vanc x 1 dose  - MRSA pending  - continue cefepime 1g q24 hours  - UA neg, UCx normal julia   - Bcx pending    ====Heme/Onc====  #normocytic Anemia in setting of probable GIB  - h/o anemia of renal disease  - Hgb 6.9 (~8, 1/23)  - transfused two units PRBCs overnight  - monitor CBC q6h  - continue hemodynamic monitoring  - holding AC    ====Endo====  #bG goal: 140-180  - ISS while NPO    ====Skin/lines====  #peripheral: none  #CVC: RI (1/30) for vascular access  #montiel: none    ====Ppx====  #DVT: hold chemical ppx for bleeding, SCDs  #GI: PPI bid   Assessment  - 73M, from [facility], w/ PMH of afib (on eliquis), CKD (s/p R AVF creation), HTN, BPH, CAD (s/p PCI), AS (s/p TAVR), VT (s/p Medtronic BiV ICD), and HFrEF, sent in from the nursing home for generalized weakness and dizziness upon awakening this morning. Admitted to ICU for hypotension, likely in setting on GIB vs septic    Plan  ====Neuro====  #Baseline mentation: AAOx3   - at baseline    ====CVS====  #probable hemorrhagic shock 2/2 GIB vs septic shock (less likely)    - h/o HTN, presented with hypotension s/p 500cc bolus and leukocytosis  - hgb 6.9 (~8, 1/23)  - status post empiric vanc x 1 dose  - MRSA pending  - continue cefepime 1g q24 hours  - UA neg, UCx normal julia   - Bcx pending  - GI following  - monitor CBC q6h  - continue hemodynamic monitoring    #HFrEF  - on metoprolol and lasix at home   - hold both due to hypotension  -continue hemodynamic monitoring    #Afib  - on eliquis, amio, and metoprolol at home  - hold eliquis for GIB  - hold amio and metoprolol for hypotension and NPO    #h/o VF, on amiodarone (s/p BiV AICD)  - hold amio while NPO  - resume when PO    #HTN  - on hydralazine, metoprolol at home  - hold both due to hypotension    ====Pulm====  #No active issues    ====GI====  #Diet: may advance to clears per GI  #Bowel regimen: none    #likely GIB  - hgb 6.9 (~8, 1/23)  - transfused two units PRBCs overnight  - no dark/tarry stools today, light brown stool reported  - GI following, plan for EGD on 2/3/2023  - monitor CBC q6h  - continue hemodynamic monitoring  - advance diet as above  - holding AC    ====Renal====  #CKD IV, s/p AV fistula creation on 1/19  - not yet on dialysis, makes some urine  - hold home meds  - monitor volume status, electrolytes    #BPH  - hold flomax and finasteride for now    ====ID====  # ?sepsis  -  h/o HTN, presented with hypotension s/p 500cc bolus and leukocytosis  - in setting of probably GIB, Hgb 6.9 (~8, 1/23) with dark tarry stools x weeks  - status post empiric vanc x 1 dose  - MRSA pending  - continue cefepime 1g q24 hours  - UA neg, UCx normal julia   - Bcx pending    ====Heme/Onc====  #normocytic Anemia in setting of probable GIB  - h/o anemia of renal disease  - Hgb 6.9 (~8, 1/23)  - transfused two units PRBCs overnight  - monitor CBC q6h  - continue hemodynamic monitoring  - holding AC    ====Endo====  #bG goal: 140-180  - ISS while NPO    ====Skin/lines====  #peripheral: none  #CVC: RI (1/30) for vascular access  #montiel: none    ====Ppx====  #DVT: hold chemical ppx for bleeding, SCDs  #GI: PPI bid   Gunner

## 2023-02-04 NOTE — ED ADULT NURSE NOTE - NSFALLRSKOUTCOME_ED_ALL_ED
83-year-old female to ED with low blood sugar.  Patient takes Ozempic and insulin.  And recent increase in her dosages.  Patient found by family with low blood sugar diaphoretic and to ED for evaluation.  In ED patient otherwise well-appearing and does not want any testing done.  Patient aggressively pushing back during exam. Fall with Harm Risk

## 2023-02-10 NOTE — ED ADULT NURSE NOTE - NSFALLRSKASSISTTYPE_ED_ALL_ED
normal appearance , without tenderness upon palpation , no deformities , trachea midline , Thyroid normal size , no thyroid nodules , no masses , no JVD , thyroid nontender
Standing/Walking/Toileting

## 2023-02-27 ENCOUNTER — APPOINTMENT (OUTPATIENT)
Dept: CARDIOLOGY | Facility: CLINIC | Age: 88
End: 2023-02-27
Payer: MEDICARE

## 2023-02-27 VITALS
DIASTOLIC BLOOD PRESSURE: 72 MMHG | OXYGEN SATURATION: 62 % | HEIGHT: 63 IN | BODY MASS INDEX: 19.49 KG/M2 | SYSTOLIC BLOOD PRESSURE: 118 MMHG | WEIGHT: 110 LBS

## 2023-02-27 VITALS — DIASTOLIC BLOOD PRESSURE: 60 MMHG | SYSTOLIC BLOOD PRESSURE: 90 MMHG

## 2023-02-27 DIAGNOSIS — M79.89 OTHER SPECIFIED SOFT TISSUE DISORDERS: ICD-10-CM

## 2023-02-27 DIAGNOSIS — I47.29 OTHER VENTRICULAR TACHYCARDIA: ICD-10-CM

## 2023-02-27 DIAGNOSIS — I42.8 OTHER CARDIOMYOPATHIES: ICD-10-CM

## 2023-02-27 PROCEDURE — 93000 ELECTROCARDIOGRAM COMPLETE: CPT

## 2023-02-27 PROCEDURE — 99214 OFFICE O/P EST MOD 30 MIN: CPT | Mod: 25

## 2023-02-27 RX ORDER — ALLOPURINOL 300 MG/1
300 TABLET ORAL DAILY
Refills: 0 | Status: DISCONTINUED | COMMUNITY
End: 2023-02-27

## 2023-02-27 RX ORDER — LATANOPROST/PF 0.005 %
0.01 DROPS OPHTHALMIC (EYE)
Refills: 0 | Status: ACTIVE | COMMUNITY

## 2023-02-27 RX ORDER — FUROSEMIDE 40 MG/1
40 TABLET ORAL TWICE DAILY
Qty: 180 | Refills: 3 | Status: DISCONTINUED | COMMUNITY
Start: 2023-01-17 | End: 2023-02-27

## 2023-02-27 NOTE — HISTORY OF PRESENT ILLNESS
[FreeTextEntry1] : The patient was admitted to [facility] with Chest pain and SOB . Noted to have HF . Troponins were increased but they stayed elevated and did not come up or down . She had her medications adjusted . She was started on Entresto . She has renal insuffiency and she has had issue with potassium in the past . She has a LBBB . She had an epcardial left sided lead which is not functioning so she does not have CRT . Previous caths have showed no CAD . Creatinine was 2.99 while in the hosptial.

## 2023-02-27 NOTE — CARDIOLOGY SUMMARY
[___] : [unfilled] [LVEF ___%] : LVEF [unfilled]% [de-identified] : 12- Atrial paced rhyth IVCD LBBB morphology \par 3- NSR LBBB \par 8- Atrial paced  LBBB \par 09/29/2022: SR, LBBB vs ventricular pacing . \par 1- NSR LBBB [de-identified] : 3-7-2022 EF 25-30% moderate MR Mod TR \par 9- EF 40-45% Severe concnetric LVH \par 2- From [facility] EF 45% mild to mod Global hypokinesis . mild to mod MR mild to mod TR .

## 2023-02-27 NOTE — ASSESSMENT
[FreeTextEntry1] : The patient was admitted to [facility] with CP and SOB  .Note to have NSTEMI and HF . Troponins howevere were persisently high and some of that may have been from her renal failure . The patient's EF was higher from previous echo EF 45% . BP is low in the office today . She was placed on Entresto by [facility] . She had issues with ACE and ARB's in the past from this and this is a reason why she is on Hydralazine and nitrates .She has continue hydralazine despite being on Enstresto and BP is low here .

## 2023-03-08 NOTE — DISCHARGE NOTE NURSING/CASE MANAGEMENT/SOCIAL WORK - NSSCCARECORD_GEN_ALL_CORE
MRI ADULT DISCHARGE INSTRUCTIONS    You have been medicated today for your scan. Please follow the instructions below to ensure your safe recovery. If you have any questions or problems, feel free to call us at [phone] or [phone].     1.   Have someone stay with you to assist you as needed.    2.   Do not drive or operate any mechanical devices.    3.   Do not perform any activity that requires concentration. Make no major decisions over the next 24 hours.     4.   Be careful changing positions from laying down to sitting or standing, as you may become dizzy.     5.   Do not drink alcohol for 48 hours.    6.   There are no restrictions for eating your normal meals. Drink fluids.    7.   You may continue your usual medications for pain, tranquilizers, muscle relaxants or sedatives when awake.     8.   Tomorrow, you may continue your normal daily activities.     9.   Pressure dressing on 10 - 15 minutes. If swelling or bleeding occurs when removed, continue placing direct pressure on injection site for another 5 minutes, or until bleeding stops.     I have been informed of and understand the above discharge instructions.   
[facility]

## 2023-03-30 ENCOUNTER — APPOINTMENT (OUTPATIENT)
Dept: CARDIOLOGY | Facility: CLINIC | Age: 88
End: 2023-03-30
Payer: MEDICARE

## 2023-03-30 VITALS
HEIGHT: 63 IN | DIASTOLIC BLOOD PRESSURE: 70 MMHG | WEIGHT: 116 LBS | HEART RATE: 66 BPM | SYSTOLIC BLOOD PRESSURE: 140 MMHG | BODY MASS INDEX: 20.55 KG/M2

## 2023-03-30 DIAGNOSIS — I10 ESSENTIAL (PRIMARY) HYPERTENSION: ICD-10-CM

## 2023-03-30 DIAGNOSIS — I50.1 LEFT VENTRICULAR FAILURE, UNSPECIFIED: ICD-10-CM

## 2023-03-30 PROCEDURE — 93000 ELECTROCARDIOGRAM COMPLETE: CPT

## 2023-03-30 PROCEDURE — 99214 OFFICE O/P EST MOD 30 MIN: CPT | Mod: 25

## 2023-03-30 RX ORDER — AMLODIPINE BESYLATE 2.5 MG/1
2.5 TABLET ORAL DAILY
Qty: 30 | Refills: 6 | Status: DISCONTINUED | COMMUNITY
End: 2023-03-30

## 2023-03-30 RX ORDER — SACUBITRIL AND VALSARTAN 24; 26 MG/1; MG/1
24-26 TABLET, FILM COATED ORAL
Refills: 0 | Status: DISCONTINUED | COMMUNITY
End: 2023-03-30

## 2023-03-30 NOTE — PHYSICAL EXAM
[General Appearance - Well Developed] : well developed [Normal Appearance] : normal appearance [Well Groomed] : well groomed [General Appearance - Well Nourished] : well nourished [No Deformities] : no deformities [General Appearance - In No Acute Distress] : no acute distress [Normal Conjunctiva] : the conjunctiva exhibited no abnormalities [Eyelids - No Xanthelasma] : the eyelids demonstrated no xanthelasmas [Normal Oral Mucosa] : normal oral mucosa [No Oral Pallor] : no oral pallor [No Oral Cyanosis] : no oral cyanosis [Normal Jugular Venous A Waves Present] : normal jugular venous A waves present [Normal Jugular Venous V Waves Present] : normal jugular venous V waves present [No Jugular Venous Baez A Waves] : no jugular venous baez A waves [Respiration, Rhythm And Depth] : normal respiratory rhythm and effort [Exaggerated Use Of Accessory Muscles For Inspiration] : no accessory muscle use [Auscultation Breath Sounds / Voice Sounds] : lungs were clear to auscultation bilaterally [Heart Rate And Rhythm] : heart rate and rhythm were normal [Heart Sounds] : normal S1 and S2 [Murmurs] : no murmurs present [Bowel Sounds] : normal bowel sounds [Abdomen Mass (___ Cm)] : no abdominal mass palpated [Abdomen Soft] : soft [Nail Clubbing] : no clubbing of the fingernails [Cyanosis, Localized] : no localized cyanosis [Petechial Hemorrhages (___cm)] : no petechial hemorrhages [] : no ischemic changes [Oriented To Time, Place, And Person] : oriented to person, place, and time [Affect] : the affect was normal [Mood] : the mood was normal [No Anxiety] : not feeling anxious [Normal Rate] : normal [II] : a grade 2 [1+] : left 1+ [No Pitting Edema] : no pitting edema present [FreeTextEntry1] : severe pain in knees  difficult walking.

## 2023-03-30 NOTE — HISTORY OF PRESENT ILLNESS
[FreeTextEntry1] : The patient has been feeling better since last visit . The patient has had no chest pain or SOB . The patient has significant pre-renal azotemia . The patient has stopped Entresto at last visit .

## 2023-03-30 NOTE — ASSESSMENT
[FreeTextEntry1] : The patient was admitted to [facility] with CP and SOB  .Note to have NSTEMI and HF . Troponins however were persistently high and some of that may have been from her renal failure . The patient's EF was higher from previous echo EF 45% . BP is lnow stable  . She was placed on Entresto by [facility] and this has been stopped  . She had issues with ACE and ARB's in the past from this and this is a reason why she is on Hydralazine and nitrates .She has continue hydralazine and nitrates and would maintain off of entresto . Repeat BMP and follow up with renal . On exam today she appear euvolemic. She is getting iron infusions from hematology

## 2023-03-30 NOTE — CARDIOLOGY SUMMARY
[___] : [unfilled] [LVEF ___%] : LVEF [unfilled]% [de-identified] : 12- Atrial paced rhyth IVCD LBBB morphology \par 3- NSR LBBB \par 8- Atrial paced  LBBB \par 09/29/2022: SR, LBBB vs ventricular pacing . \par 3- NSR LBBB . \par 1- NSR LBBB [de-identified] : 3-7-2022 EF 25-30% moderate MR Mod TR \par 9- EF 40-45% Severe concnetric LVH \par 2- From [facility] EF 45% mild to mod Global hypokinesis . mild to mod MR mild to mod TR .

## 2023-07-12 NOTE — ED ADULT TRIAGE NOTE - BSA (M2)
Received faxed referral from [facility] and it was given to the DEANDRA's to review.   1.58

## 2023-07-14 ENCOUNTER — APPOINTMENT (OUTPATIENT)
Dept: CARDIOLOGY | Facility: CLINIC | Age: 88
End: 2023-07-14
Payer: MEDICARE

## 2023-07-14 VITALS — HEART RATE: 60 BPM | SYSTOLIC BLOOD PRESSURE: 110 MMHG | DIASTOLIC BLOOD PRESSURE: 60 MMHG

## 2023-07-14 VITALS — WEIGHT: 118 LBS | BODY MASS INDEX: 20.9 KG/M2

## 2023-07-14 VITALS — TEMPERATURE: 96.8 F | HEIGHT: 63 IN

## 2023-07-14 DIAGNOSIS — R07.9 CHEST PAIN, UNSPECIFIED: ICD-10-CM

## 2023-07-14 DIAGNOSIS — R07.89 OTHER CHEST PAIN: ICD-10-CM

## 2023-07-14 DIAGNOSIS — I65.23 OCCLUSION AND STENOSIS OF BILATERAL CAROTID ARTERIES: ICD-10-CM

## 2023-07-14 PROCEDURE — 93000 ELECTROCARDIOGRAM COMPLETE: CPT

## 2023-07-14 PROCEDURE — 99214 OFFICE O/P EST MOD 30 MIN: CPT | Mod: 25

## 2023-07-14 RX ORDER — TORSEMIDE 10 MG/1
10 TABLET ORAL DAILY
Qty: 90 | Refills: 3 | Status: DISCONTINUED | COMMUNITY
Start: 2023-01-17 | End: 2023-07-14

## 2023-07-14 RX ORDER — FOLIC ACID 1 MG/1
1 TABLET ORAL
Refills: 0 | Status: DISCONTINUED | COMMUNITY
End: 2023-07-14

## 2023-07-14 RX ORDER — ATORVASTATIN CALCIUM 10 MG/1
10 TABLET, FILM COATED ORAL DAILY
Qty: 90 | Refills: 3 | Status: DISCONTINUED | COMMUNITY
End: 2023-07-14

## 2023-07-14 RX ORDER — HYDRALAZINE HYDROCHLORIDE 10 MG/1
10 TABLET ORAL
Qty: 90 | Refills: 6 | Status: DISCONTINUED | COMMUNITY
Start: 2023-01-17 | End: 2023-07-14

## 2023-07-14 NOTE — CARDIOLOGY SUMMARY
[___] : [unfilled] [LVEF ___%] : LVEF [unfilled]% [de-identified] : 12- Atrial paced rhyth IVCD LBBB morphology \par 3- NSR LBBB \par 8- Atrial paced  LBBB \par 09/29/2022: SR, LBBB vs ventricular pacing . \par 7- Atrail paced beats with LBBB vs paced beats \par 3- NSR LBBB . \par 1- NSR LBBB [de-identified] : 3-7-2022 EF 25-30% moderate MR Mod TR \par 9- EF 40-45% Severe concnetric LVH \par 2- From [facility] EF 45% mild to mod Global hypokinesis . mild to mod MR mild to mod TR .

## 2023-07-14 NOTE — ASSESSMENT
[FreeTextEntry1] : The patient was again admitted with HF and had increased CE  most likely type II . CE are usually increased in this patient as well from her renal failure . The patient has not had chest pain since last visit . She has been cathed in the past at least tow times which showed no significant CAD .She has had a DVT and remotely had aa PE and is currently on Eliquis .

## 2023-07-14 NOTE — HISTORY OF PRESENT ILLNESS
[FreeTextEntry1] : The patient had chest pain and SOB n. She was admitted to [facility] with CHF and NSTEMI . The patient had her medication adjusted and she was diesharged . After discharge she has been on her curent meedication  She is improved but still has issues .  .

## 2023-07-31 LAB
ALBUMIN SERPL ELPH-MCNC: 3.7 G/DL
ALP BLD-CCNC: 175 U/L
ALT SERPL-CCNC: 17 U/L
ANION GAP SERPL CALC-SCNC: 12 MMOL/L
AST SERPL-CCNC: 32 U/L
BILIRUB SERPL-MCNC: 0.2 MG/DL
BUN SERPL-MCNC: 61 MG/DL
CALCIUM SERPL-MCNC: 9.6 MG/DL
CHLORIDE SERPL-SCNC: 105 MMOL/L
CHOLEST SERPL-MCNC: 168 MG/DL
CO2 SERPL-SCNC: 21 MMOL/L
CREAT SERPL-MCNC: 3 MG/DL
EGFR: 15 ML/MIN/1.73M2
ESTIMATED AVERAGE GLUCOSE: 108 MG/DL
GLUCOSE SERPL-MCNC: 82 MG/DL
HBA1C MFR BLD HPLC: 5.4 %
HDLC SERPL-MCNC: 42 MG/DL
LDLC SERPL CALC-MCNC: 100 MG/DL
NONHDLC SERPL-MCNC: 126 MG/DL
POTASSIUM SERPL-SCNC: 5.8 MMOL/L
PROT SERPL-MCNC: 6.8 G/DL
SODIUM SERPL-SCNC: 138 MMOL/L
TRIGL SERPL-MCNC: 131 MG/DL

## 2023-08-01 ENCOUNTER — APPOINTMENT (OUTPATIENT)
Dept: CARDIOLOGY | Facility: CLINIC | Age: 88
End: 2023-08-01

## 2023-08-03 LAB
ANION GAP SERPL CALC-SCNC: 13 MMOL/L
BUN SERPL-MCNC: 62 MG/DL
CALCIUM SERPL-MCNC: 9 MG/DL
CHLORIDE SERPL-SCNC: 103 MMOL/L
CO2 SERPL-SCNC: 21 MMOL/L
CREAT SERPL-MCNC: 3.3 MG/DL
EGFR: 13 ML/MIN/1.73M2
GLUCOSE SERPL-MCNC: 145 MG/DL
POTASSIUM SERPL-SCNC: 5.1 MMOL/L
SODIUM SERPL-SCNC: 137 MMOL/L

## 2023-08-17 NOTE — PRE-ANESTHESIA EVALUATION ADULT - HEIGHT IN FEET
Recent Visits  Date Type Provider Dept   07/24/23 Office Visit Lazara Galarza MD [facility]   04/24/23 Office Visit Lazara Galarza MD [facility]   02/24/23 Office Visit Lazara Galarza MD [facility]   01/23/23 Office Visit Lazara Galarza MD [facility]   10/18/22 Office Visit Lazara Galarza MD [facility]   06/15/22 Office Visit Lazara Galarza MD [facility]   03/11/22 Office Visit Lazara Galarza MD [facility]   Showing recent visits within past 540 days with a meds authorizing provider and meeting all other requirements  Future Appointments  Date Type Provider Dept   10/20/23 Appointment Lazara Galarza MD [facility]   11/27/23 Appointment Lazara Galarza MD [facility]   Showing future appointments within next 150 days with a meds authorizing provider and meeting all other requirements     7/24/2023
5
5

## 2023-08-22 NOTE — ED ADULT NURSE NOTE - IS THE PATIENT ABLE TO BE SCREENED?
Pre-operative Update of H&P:    I  have seen & examined Ms. Ale Rolle related solely to her hand and upper extremity conditions, prior to the scheduled procedure on the date of her surgery. The indications for the planned surgical procedure & and her upper-extremity condition are unchanged. Yes

## 2023-09-07 NOTE — H&P ADULT - HISTORY OF PRESENT ILLNESS
[facility]    Hospitalist Progress Note    Date of Service (when I saw the patient): 09/07/2023    Assessment & Plan   Tianna Lobato is a 70 year old female who was admitted on 9/3/2023.     Community-acquired pneumonia: Resultant sepsis and acute hypoxic respiratory failure.  CTA chest with right-sided opacities, left-sided opacities as well as moderate lleft pleural effusion.  COVID-negative.  Patient is a smoker, but no formal diagnosis of COPD has ever been made.  Tmax 100.7 on admission, WBC 12.8.  Blood cultures drawn in the emergency department. Legionella, strep pneumo antigen pending.  -9/4: With persistent symptoms and worsening inflammatory markers, will switch ceftriaxone to cefepime, add vancomycin.  Continue azithromycin.  Blood cultures no growth to date.  Will order ultrasound-guided thoracentesis with studies on left effusion  -9/5: Patient reports mild improvement.  Inflammatory markers still elevated if not worsening.  Continuing cefepime, azithromycin, vancomycin.  Respiratory status stable, weaned down to 1 L nasal cannula.  Patient did undergo ultrasound-guided thoracentesis on left-sided effusion today.  Legionella, strep pneumo antigen negative.  -9/6: Patient's respiratory status stable, continues on 1 L.  Inflammatory markers remain elevated.  Continue on cefepime, azithromycin, vancomycin.  Pleural fluid analysis pending including culture, but preliminarily pH 7.0, suggesting infection.  May need to consult surgery for chest tube.  -9/7: Respiratory status stable.  Fever curve improving.  Inflammatory markers remain elevated.  Continue cefepime, azithromycin, vancomycin.  Fluid analysis confirms exudate status.  Possible empyema.  Surgery consult for chest tube.  Repeat CT scan of the chest today.  Pleural fluid culture as well as blood cultures no growth to date.  Cytology of pleural fluid pending.    Essential hypertension: Patient is on Inderal LA 80 mg.  -Continue as  "able    Diabetes mellitus: Newly diagnosed on admission, hemoglobin A1c 7.4.  -We will order insulin sliding scale as needed  -Likely discharge on metformin     Bilateral lower leg swelling: Patient is on Lasix 20 mg daily  -We will hold currently in the setting of acute infection     Anxiety/depression: Continue home Effexor, clonazepam     Hyperlipidemia: Continue home statin     Hypothyroidism: Continue home Synthroid     Tobacco dependence: Nicotine replacement as needed     FEN: Oral diet as tolerated.  Electrolytes within normal limits.    Clinically Significant Risk Factors                        # DMII: A1C = 7.4 % (Ref range: <5.7 %) within past 6 months  , PRESENT ON ADMISSION    # Obesity: Estimated body mass index is 37.74 kg/m  as calculated from the following:    Height as of this encounter: 1.549 m (5' 1\").    Weight as of this encounter: 90.6 kg (199 lb 11.8 oz)., PRESENT ON ADMISSION            DVT Prophylaxis: Enoxaparin (Lovenox) subcutaneous, holding for thoracentesis    Code Status: Full Code    Disposition: Expected discharge in 1-2 days once clinically improved.    Kamini Roach MD, MD        Interval History   Patient seen in room.  Patient reports no acute events overnight, no new symptoms.  Respiratory status is stable.    -Data reviewed today: I reviewed all new labs and imaging results over the last 24 hours. I personally reviewed imaging reports.    Physical Exam   Temp: 99.5  F (37.5  C) Temp src: Tympanic BP: 107/68 Pulse: 74   Resp: 20 SpO2: 95 % O2 Device: None (Room air) Oxygen Delivery: 1/2 LPM  Vitals:    09/04/23 0315 09/05/23 0313 09/06/23 0531   Weight: 87.7 kg (193 lb 5.5 oz) 90.4 kg (199 lb 4.7 oz) 90.6 kg (199 lb 11.8 oz)     Vital Signs with Ranges  Temp:  [97.3  F (36.3  C)-99.9  F (37.7  C)] 99.5  F (37.5  C)  Pulse:  [74-86] 74  Resp:  [20] 20  BP: (105-142)/(53-68) 107/68  SpO2:  [89 %-95 %] 95 %      Intake/Output Summary (Last 24 hours) at 9/7/2023 0904  Last data " filed at 9/6/2023 1841  Gross per 24 hour   Intake 510 ml   Output 100 ml   Net 410 ml         Peripheral IV 09/06/23 Anterior;Left Antecubital fossa (Active)   Site Assessment WDL 09/07/23 0300   Line Status Saline locked 09/07/23 0300   Dressing Transparent 09/07/23 0300   Dressing Status clean;dry;intact 09/07/23 0300   Line Intervention Flushed 09/07/23 0300   Phlebitis Scale 0-->no symptoms 09/07/23 0300   Infiltration? no 09/06/23 1820   Number of days: 1     No line/device    Constitutional: AA, NAD, obese  Eyes: PERRLA, no injection, no icterus  HEENT: atraumatic, normocephalic  Respiratory: reduced breath sounds b/l  Cardiovascular: S1 S2 RRR  GI: soft, NT, ND, + bowel sounds  Lymph/Hematologic: no palpable lymphadenopathy  Skin: no rashes, no lesions  Musculoskeletal: No LE edema, good tone, no deformities  Neurologic: oriented x 3, no focal deficits  Psychiatric: appropriate affect         Medications        azithromycin  500 mg Intravenous Q24H    ceFEPIme  2 g Intravenous Q8H    clomiPRAMINE  100 mg Oral At Bedtime    [Held by provider] enoxaparin ANTICOAGULANT  40 mg Subcutaneous Q24H    [Held by provider] furosemide  20 mg Oral Daily    ipratropium - albuterol 0.5 mg/2.5 mg/3 mL  3 mL Nebulization 4x daily    levothyroxine  50 mcg Oral QAM AC    lidocaine  2 patch Transdermal Q24h    nicotine   Transdermal Q8H    pantoprazole  40 mg Oral BID    pravastatin  10 mg Oral Daily    propranolol ER  80 mg Oral Daily    sodium chloride (PF)  3 mL Intracatheter Q8H    vancomycin  1,500 mg Intravenous Q12H    venlafaxine  300 mg Oral Daily       Data   Recent Labs   Lab 09/07/23  0618 09/06/23  0522 09/05/23  0539 09/04/23  0524 09/03/23  1407   WBC 14.6* 17.0* 20.5*   < > 12.8*   HGB 11.0* 12.8 12.0   < > 13.3   MCV 92 89 92   < > 90    352 348   < > 329   INR  --   --   --   --  0.99    140 139   < > 138   POTASSIUM 3.6 4.1 3.6   < > 4.1   CHLORIDE 104 104 103   < > 100   CO2 23 21* 23   < > 25    BUN 12.7 14.6 13.3   < > 9.3   CR 0.66 0.68 0.74   < > 0.88   ANIONGAP 13 15 13   < > 13   MESSI 8.7* 9.0 9.0   < > 9.4   * 120* 116*   < > 214*   ALBUMIN  --   --   --   --  3.5   PROTTOTAL  --   --  6.9  --  7.1   BILITOTAL  --   --   --   --  0.2   ALKPHOS  --   --   --   --  101   ALT  --   --   --   --  21   AST  --   --   --   --  26    < > = values in this interval not displayed.            No results found for this or any previous visit (from the past 24 hour(s)).      Kamini Roach MD     86 year old patient (Hindu), known to hav  HFrEF (Echo 3/2022: EF 25-30%) s/p AICD, pulm HTN, provoked PE/DVT on Eliquis, DLD, CKD 4 and glaucoma, presented to ED with SOB of duration.   History goes back when patient started     She denies fever, chills, headache, upper respiratory symptoms, abdominal or urinary symptoms. No chest pain, no palpitations. No sick contacts.   She is compliant with her medications.     In ED, vitals wnl  Laboratory workup significant for Hb 8.1 (at baseline) K 5.2 Cr 2.5 eGFR 18 (at baseline)   Troponin 0.02 BNP 57623  Chest X-ray: congestion (unofficial read)   She received 1 dose of IV Lasix 40 mg in ED.  86 year old patient (Orthodox), known to hav  HFrEF (Echo 3/2022: EF 25-30%) s/p AICD, pulm HTN, provoked PE/DVT on Eliquis, DLD, CKD 4, RA and glaucoma, presented to ED with SOB and worsening LE edema of 1 day duration. Associated with orthopnea.   Patient also reports worsening pain in her bilateral shoulders and knees. To note patient was recently admitted on 7/2022 for bilateral shoulder pain.   She denies fever, chills, headache, upper respiratory symptoms, abdominal or urinary symptoms. No chest pain, no palpitations. No sick contacts.   She is compliant with her medications.     In ED, vitals wnl  Laboratory workup significant for Hb 8.1 (at baseline) K 5.2 Cr 2.5 eGFR 18 (at baseline)   Troponin 0.02 BNP 77082  Chest X-ray: congestion (unofficial read)   She received 1 dose of IV Lasix 40 mg in ED.

## 2023-09-07 NOTE — ED ADULT NURSE NOTE - NS ED NOTE ABUSE SUSPICION NEGLECT YN
Subjective   Patient ID: Amelia is a 10 year old female.  History obtained from patient and parent  Chief Complaint   Patient presents with   • Ear Pain     Right ear pain since last night   • Congestion     For 3 or 4 days       HPI: Amelia is a 10 year old female with a PMH significant for eczema who presents with congestion and R otalgia  Started 3-4 days ago with cough, congestion, rhinorrhea  :Last night with R otalgia and trouble sleeping due to pain in the R ear  No medication given     Denies fever, cough, wheezing, SOB, headache, abdominal pain, V/D, rash, conjunctivitis, trouble breathing, neuro changes, swelling or the hands or feet, or sore throat.  Eating and drinking well at home with normal UOP >4 per day.      +sick contacts in the household (brother with URI, mother with URI)  Pt is NOT COVID vaccinated  + school/   recent travel or exposures: NELL    I personally reviewed and analyzed notes from PMD    Past Medical History:   Diagnosis Date   • Blood type O-    • Eczema    • Febrile seizure (CMD)     Hospitalized for 1 week while in [facility] at ~17 mo old--had 2 seizures associated with fever   • Mollusca contagiosa        MEDICATIONS:  Current Outpatient Medications   Medication Sig   • Lactobacillus Rhamnosus, GG, (Culturelle) Cap Take 1 capsule by mouth daily.     No current facility-administered medications for this visit.       ALLERGIES:  ALLERGIES:   Allergen Reactions   • Amoxicillin Other (See Comments)     Rash at the 10 day dorian   • Penicillins HIVES       PAST SURGICAL HISTORY:  Past Surgical History:   Procedure Laterality Date   • No past surgeries         FAMILY HISTORY:  Family History   Problem Relation Age of Onset   • Hypothyroid Mother    • Patient is unaware of any medical problems Father    • Down Syndrome Brother         Placed for adoption   • Cancer, Skin Melanoma Neg Hx        SOCIAL HISTORY:  Social History     Tobacco Use   • Smoking status: Never   • Smokeless  tobacco: Never         VACCINES:  UTD    Review of Systems A 12 point review of systems was performed. Pertinent positives and negatives are noted in the HPI.       Visit Vitals  /67   Pulse 99   Temp 98.4 °F (36.9 °C) (Temporal)   Wt 36.8 kg (81 lb 3.8 oz)   SpO2 97%       Physical Exam  General: awake, alert, well nourished, well appearing   HEENT: normocephalic, sclerae white, pupils equal, round, and reactive to light, extraocular movements intact, normal external ears, tympanic membranes R TM erythematous with small purulent effusion, nares congested with clear rhinorrhea, mucous membranes moist, oropharynx erythematous without lesions  Neck: supple, shotty LAD  Heart/CV: regular rate and rhythm, no murmur  Lungs/Chest: breathing in the 20's without distress good expansion and aeration bilaterally  No wheezing.  No rales, retractions, flaring or tugging  Abdomen/GI: soft, non-tender, non-distended, normoactive bowel sounds, no hepatosplenomegaly  Extr/Muscle: full range of motion of all extremities  Neuro: alert, interactive, normal tone, moves all extremities well    No LOS data to display  This includes pre-charting, chart review and documenting.   ASSESSMENT/ PLAN: Amelia is a 10 year old female presents with recent URI with new R otalgia likely due to viral URI with R AOM.  Pt is well appearing and well hydrated on exam without respiratory distress.  AOM: of the R ear  - Wait-and-see approach for AOM: cefdinir PO BID x 5 days sent to preferred pharmacy  - Acetaminophen and ibuprofen PRN for otalgia/fever  - Family instructed to fill antibiotic and start if: fever and/or otalgia >48hr, decreased PO intake, severe otalgia, otorrhea, altered mental status, signs/symptoms of dehydration  - If starting medication, instructed to call and discuss patient's status with pediatrician  - If dehydration, altered mental status, or other signs/symptoms of more severe complications, also instructed to seek emergent  medical attention  - Anticipatory guidance and return precautions discussed  - Parent expressed agreement with plan, all questions answered    FOLLOW-UP:  No follow-ups on file.     Plan of care and anticipatory guidance discussed with patient/parents at bedside.  Parents displayed good understanding of plan of care.    Betzaida Worley MD       No

## 2023-10-25 NOTE — ED ADULT NURSE NOTE - NSICDXFAMILYHX_GEN_ALL_CORE_FT
FAMILY HISTORY:  FH: arthritis, sister     Doxepin Pregnancy And Lactation Text: This medication is Pregnancy Category C and it isn't known if it is safe during pregnancy. It is also excreted in breast milk and breast feeding isn't recommended.

## 2023-11-02 NOTE — ED ADULT TRIAGE NOTE - NSWEIGHTCALCTOOLDRUG_GEN_A_CORE
used What Type Of Note Output Would You Prefer (Optional)?: Standard Output Hpi Title: Evaluation of Skin Lesions

## 2023-11-07 ENCOUNTER — APPOINTMENT (OUTPATIENT)
Dept: CARDIOLOGY | Facility: CLINIC | Age: 88
End: 2023-11-07
Payer: MEDICARE

## 2023-11-07 VITALS
DIASTOLIC BLOOD PRESSURE: 68 MMHG | HEIGHT: 63 IN | SYSTOLIC BLOOD PRESSURE: 134 MMHG | HEART RATE: 61 BPM | WEIGHT: 120 LBS | BODY MASS INDEX: 21.26 KG/M2

## 2023-11-07 DIAGNOSIS — Z95.810 PRESENCE OF AUTOMATIC (IMPLANTABLE) CARDIAC DEFIBRILLATOR: ICD-10-CM

## 2023-11-07 PROCEDURE — 99214 OFFICE O/P EST MOD 30 MIN: CPT | Mod: 25

## 2023-11-07 PROCEDURE — 93000 ELECTROCARDIOGRAM COMPLETE: CPT

## 2023-11-07 RX ORDER — FOLIC ACID 1 MG/1
1 TABLET ORAL TWICE DAILY
Refills: 0 | Status: ACTIVE | COMMUNITY

## 2023-11-07 RX ORDER — NITROGLYCERIN 0.4 MG/1
0.4 TABLET SUBLINGUAL
Refills: 0 | Status: ACTIVE | COMMUNITY

## 2023-11-07 RX ORDER — OXYCODONE HYDROCHLORIDE 5 MG/1
5 CAPSULE ORAL
Refills: 0 | Status: DISCONTINUED | COMMUNITY
End: 2023-11-07

## 2024-01-01 NOTE — ED PROVIDER NOTE - NEUROLOGICAL, MLM
Assumed care of infant at 1500. Infant pink, asleep, and on room air. MAR and orders checked at this time.    Alert and oriented, no focal deficits, no motor or sensory deficits.

## 2024-01-02 NOTE — ED ADULT TRIAGE NOTE - AS HEIGHT TYPE
Brief Operative Note    Patient: Aracelis Bobo 30 year old female    MRN: [id number]    Surgeon(s): Arian Singer MD  Phone Number: [phone]                       Surgeon(s) and Role:     * Arian Singer MD - Primary     * Nicolasa Del Real MD - Assisting    Assistant(s): Dr. Del Real    Pre-Op Diagnosis: History of  delivery affecting pregnancy [O34.219]     Post-Op Diagnosis: same     Procedure: Procedure(s):  REPEAT  SECTION    Anesthesia Type: Spinal                                   Complications: None    Findings: Normal female , normal anatomy.    Specimens Removed: No specimens collected     Estimated Blood Loss: 600cc          
stated

## 2024-02-06 ENCOUNTER — APPOINTMENT (OUTPATIENT)
Dept: CARDIOLOGY | Facility: CLINIC | Age: 89
End: 2024-02-06
Payer: MEDICARE

## 2024-02-06 VITALS
SYSTOLIC BLOOD PRESSURE: 134 MMHG | DIASTOLIC BLOOD PRESSURE: 62 MMHG | HEART RATE: 60 BPM | HEIGHT: 62 IN | WEIGHT: 119 LBS | BODY MASS INDEX: 21.9 KG/M2

## 2024-02-06 DIAGNOSIS — I50.9 HEART FAILURE, UNSPECIFIED: ICD-10-CM

## 2024-02-06 DIAGNOSIS — E78.5 HYPERLIPIDEMIA, UNSPECIFIED: ICD-10-CM

## 2024-02-06 DIAGNOSIS — N28.9 DISORDER OF KIDNEY AND URETER, UNSPECIFIED: ICD-10-CM

## 2024-02-06 DIAGNOSIS — Z86.718 PERSONAL HISTORY OF OTHER VENOUS THROMBOSIS AND EMBOLISM: ICD-10-CM

## 2024-02-06 PROCEDURE — 93000 ELECTROCARDIOGRAM COMPLETE: CPT

## 2024-02-06 PROCEDURE — 99214 OFFICE O/P EST MOD 30 MIN: CPT

## 2024-02-06 NOTE — CARDIOLOGY SUMMARY
[de-identified] : 12- Atrial paced rhyth IVCD LBBB morphology  3- NSR LBBB  8- Atrial paced  LBBB  2-6-2024 NSR  LBBB vs ventriucalr paced  11-7-2023 NSR LBBB  09/29/2022: SR, LBBB vs ventricular pacing .  7- Atrail paced beats with LBBB vs paced beats  3- NSR LBBB .  1- NSR LBBB [de-identified] : 3-7-2022 EF 25-30% moderate MR Mod TR  9- EF 40-45% Severe concnetric LVH  2- From [facility] EF 45% mild to mod Global hypokinesis . mild to mod MR mild to mod TR .  EF 45% mild to mod MR mod TR RVSP was 56 mmmhg  12- EF 50%  RVSP was 35 mmhg  [___] : [unfilled] [LVEF ___%] : LVEF [unfilled]% [___] : [unfilled]

## 2024-02-06 NOTE — ASSESSMENT
[FreeTextEntry1] : The patient had been admitted to [facility] for CHF and PNA . She has worsening CKD . She has been followed by renal . She remains on 40 of Lsix . EF was 50% on last echo . She is ont on ACE I secondary to past hyperkalemia and CKD . She is on hydralazine and nitrates . Previous cath showed nonobstructive CAD . She has had DVT/PE in the past and she remains on DOAC . On exam today her lungs are clear and she has trace edema bilaterally

## 2024-02-06 NOTE — REVIEW OF SYSTEMS
[SOB] : shortness of breath [Dyspnea on exertion] : dyspnea during exertion [Chest Discomfort] : no chest discomfort [Palpitations] : no palpitations [Cough] : cough [Joint Pain] : joint pain [Negative] : Psychiatric

## 2024-02-06 NOTE — PHYSICAL EXAM
[General Appearance - Well Developed] : well developed [Normal Appearance] : normal appearance [Well Groomed] : well groomed [General Appearance - Well Nourished] : well nourished [No Deformities] : no deformities [General Appearance - In No Acute Distress] : no acute distress [Normal Conjunctiva] : the conjunctiva exhibited no abnormalities [Eyelids - No Xanthelasma] : the eyelids demonstrated no xanthelasmas [Normal Oral Mucosa] : normal oral mucosa [No Oral Pallor] : no oral pallor [No Oral Cyanosis] : no oral cyanosis [Normal Jugular Venous A Waves Present] : normal jugular venous A waves present [Normal Jugular Venous V Waves Present] : normal jugular venous V waves present [No Jugular Venous Baez A Waves] : no jugular venous baez A waves [Respiration, Rhythm And Depth] : normal respiratory rhythm and effort [Exaggerated Use Of Accessory Muscles For Inspiration] : no accessory muscle use [Auscultation Breath Sounds / Voice Sounds] : lungs were clear to auscultation bilaterally [Heart Rate And Rhythm] : heart rate and rhythm were normal [Heart Sounds] : normal S1 and S2 [Murmurs] : no murmurs present [Bowel Sounds] : normal bowel sounds [Abdomen Soft] : soft [Abdomen Mass (___ Cm)] : no abdominal mass palpated [FreeTextEntry1] : severe pain in knees  difficult walking.  [Nail Clubbing] : no clubbing of the fingernails [Cyanosis, Localized] : no localized cyanosis [Petechial Hemorrhages (___cm)] : no petechial hemorrhages [] : no ischemic changes [Oriented To Time, Place, And Person] : oriented to person, place, and time [Affect] : the affect was normal [Mood] : the mood was normal [No Anxiety] : not feeling anxious [Normal Rate] : normal [II] : a grade 2 [1+] : left 1+ [No Pitting Edema] : no pitting edema present

## 2024-02-06 NOTE — HISTORY OF PRESENT ILLNESS
[FreeTextEntry1] : The patient was admitted to [facility] with SOB . She was treated  for HF and PNA . EF was 50% on echo done in the hospital. Now improved . Her creatine was 4.5 while in the hospital and there was a significant pre renal component to that

## 2024-02-06 NOTE — REASON FOR VISIT
[Symptom and Test Evaluation] : symptom and test evaluation [Arrhythmia/ECG Abnorrmalities] : arrhythmia/ECG abnormalities [Structural Heart and Valve Disease] : structural heart and valve disease [FreeTextEntry3] : Dr. Celeste  [Follow-Up - Clinic] : a clinic follow-up of [Cardiomyopathy] : cardiomyopathy [Chest Pain] : chest pain [Hyperlipidemia] : hyperlipidemia [Hypertension] : hypertension [Discharge Date: ___] : Discharge Date: [unfilled] [Admitted for Heart Failure] : patient was not admitted for heart failure [Follow-Up: _____] : a [unfilled] follow-up visit [Formal Caregiver] : formal caregiver

## 2024-03-21 ENCOUNTER — INPATIENT (INPATIENT)
Facility: HOSPITAL | Age: 89
LOS: 15 days | Discharge: HOME | DRG: 673 | End: 2024-04-06
Attending: INTERNAL MEDICINE | Admitting: INTERNAL MEDICINE
Payer: MEDICARE

## 2024-03-21 ENCOUNTER — NON-APPOINTMENT (OUTPATIENT)
Age: 89
End: 2024-03-21

## 2024-03-21 ENCOUNTER — APPOINTMENT (OUTPATIENT)
Dept: CARDIOLOGY | Facility: CLINIC | Age: 89
End: 2024-03-21
Payer: MEDICARE

## 2024-03-21 VITALS
BODY MASS INDEX: 22.82 KG/M2 | HEART RATE: 61 BPM | SYSTOLIC BLOOD PRESSURE: 137 MMHG | DIASTOLIC BLOOD PRESSURE: 73 MMHG | WEIGHT: 124 LBS | HEIGHT: 62 IN

## 2024-03-21 VITALS
HEART RATE: 60 BPM | DIASTOLIC BLOOD PRESSURE: 60 MMHG | OXYGEN SATURATION: 100 % | SYSTOLIC BLOOD PRESSURE: 125 MMHG | TEMPERATURE: 98 F | RESPIRATION RATE: 20 BRPM

## 2024-03-21 DIAGNOSIS — E11.9 TYPE 2 DIABETES MELLITUS W/OUT COMPLICATIONS: ICD-10-CM

## 2024-03-21 DIAGNOSIS — I42.9 CARDIOMYOPATHY, UNSPECIFIED: ICD-10-CM

## 2024-03-21 DIAGNOSIS — Z90.710 ACQUIRED ABSENCE OF BOTH CERVIX AND UTERUS: Chronic | ICD-10-CM

## 2024-03-21 DIAGNOSIS — Z95.0 PRESENCE OF CARDIAC PACEMAKER: Chronic | ICD-10-CM

## 2024-03-21 DIAGNOSIS — Z90.89 ACQUIRED ABSENCE OF OTHER ORGANS: Chronic | ICD-10-CM

## 2024-03-21 DIAGNOSIS — Z90.49 ACQUIRED ABSENCE OF OTHER SPECIFIED PARTS OF DIGESTIVE TRACT: Chronic | ICD-10-CM

## 2024-03-21 DIAGNOSIS — D64.9 ANEMIA, UNSPECIFIED: ICD-10-CM

## 2024-03-21 DIAGNOSIS — K21.00 GASTRO-ESOPHAGEAL REFLUX DISEASE WITH ESOPHAGITIS, WITHOUT BLEEDING: ICD-10-CM

## 2024-03-21 DIAGNOSIS — I65.29 OCCLUSION AND STENOSIS OF UNSPECIFIED CAROTID ARTERY: ICD-10-CM

## 2024-03-21 DIAGNOSIS — Z98.890 OTHER SPECIFIED POSTPROCEDURAL STATES: Chronic | ICD-10-CM

## 2024-03-21 DIAGNOSIS — I82.409 ACUTE EMBOLISM AND THROMBOSIS OF UNSPECIFIED DEEP VEINS OF UNSPECIFIED LOWER EXTREMITY: ICD-10-CM

## 2024-03-21 LAB
BASOPHILS # BLD AUTO: 0.01 K/UL — SIGNIFICANT CHANGE UP (ref 0–0.2)
BASOPHILS NFR BLD AUTO: 0.1 % — SIGNIFICANT CHANGE UP (ref 0–1)
EOSINOPHIL # BLD AUTO: 0.14 K/UL — SIGNIFICANT CHANGE UP (ref 0–0.7)
EOSINOPHIL NFR BLD AUTO: 1.8 % — SIGNIFICANT CHANGE UP (ref 0–8)
HCT VFR BLD CALC: 31.1 % — LOW (ref 37–47)
HGB BLD-MCNC: 10.1 G/DL — LOW (ref 12–16)
IMM GRANULOCYTES NFR BLD AUTO: 1.3 % — HIGH (ref 0.1–0.3)
LACTATE SERPL-SCNC: 0.9 MMOL/L — SIGNIFICANT CHANGE UP (ref 0.7–2)
LYMPHOCYTES # BLD AUTO: 1.59 K/UL — SIGNIFICANT CHANGE UP (ref 1.2–3.4)
LYMPHOCYTES # BLD AUTO: 20 % — LOW (ref 20.5–51.1)
MCHC RBC-ENTMCNC: 28.2 PG — SIGNIFICANT CHANGE UP (ref 27–31)
MCHC RBC-ENTMCNC: 32.5 G/DL — SIGNIFICANT CHANGE UP (ref 32–37)
MCV RBC AUTO: 86.9 FL — SIGNIFICANT CHANGE UP (ref 81–99)
MONOCYTES # BLD AUTO: 0.84 K/UL — HIGH (ref 0.1–0.6)
MONOCYTES NFR BLD AUTO: 10.6 % — HIGH (ref 1.7–9.3)
NEUTROPHILS # BLD AUTO: 5.28 K/UL — SIGNIFICANT CHANGE UP (ref 1.4–6.5)
NEUTROPHILS NFR BLD AUTO: 66.2 % — SIGNIFICANT CHANGE UP (ref 42.2–75.2)
NRBC # BLD: 0 /100 WBCS — SIGNIFICANT CHANGE UP (ref 0–0)
PLATELET # BLD AUTO: 356 K/UL — SIGNIFICANT CHANGE UP (ref 130–400)
PMV BLD: 9.1 FL — SIGNIFICANT CHANGE UP (ref 7.4–10.4)
RBC # BLD: 3.58 M/UL — LOW (ref 4.2–5.4)
RBC # FLD: 13.4 % — SIGNIFICANT CHANGE UP (ref 11.5–14.5)
WBC # BLD: 7.96 K/UL — SIGNIFICANT CHANGE UP (ref 4.8–10.8)
WBC # FLD AUTO: 7.96 K/UL — SIGNIFICANT CHANGE UP (ref 4.8–10.8)

## 2024-03-21 PROCEDURE — 93000 ELECTROCARDIOGRAM COMPLETE: CPT

## 2024-03-21 PROCEDURE — 71045 X-RAY EXAM CHEST 1 VIEW: CPT | Mod: 26

## 2024-03-21 PROCEDURE — 99285 EMERGENCY DEPT VISIT HI MDM: CPT | Mod: FS

## 2024-03-21 PROCEDURE — 93010 ELECTROCARDIOGRAM REPORT: CPT

## 2024-03-21 PROCEDURE — 99214 OFFICE O/P EST MOD 30 MIN: CPT

## 2024-03-21 RX ORDER — ONDANSETRON 8 MG/1
4 TABLET, FILM COATED ORAL ONCE
Refills: 0 | Status: COMPLETED | OUTPATIENT
Start: 2024-03-21 | End: 2024-03-21

## 2024-03-21 RX ORDER — ALBUTEROL SULFATE 90 UG/1
108 (90 BASE) INHALANT RESPIRATORY (INHALATION)
Refills: 0 | Status: ACTIVE | COMMUNITY

## 2024-03-21 RX ORDER — ATORVASTATIN CALCIUM 10 MG/1
10 TABLET, FILM COATED ORAL
Refills: 0 | Status: ACTIVE | COMMUNITY

## 2024-03-21 RX ADMIN — ONDANSETRON 4 MILLIGRAM(S): 8 TABLET, FILM COATED ORAL at 23:32

## 2024-03-21 NOTE — ASSESSMENT
[FreeTextEntry1] : The patient has known ischemic CM. She has a left sided lead but it is epicardial and not functioning . She has been on max tolerated GDMT for HF . There are limits in view of her renal function  She has had recent echo from [facility] which showed mild LV systolic function  The patient 's weight is slightly increased  .Since discharge  she has been back on Entresto . She has had a tremor and had some nausea and vomiting . Etiology of this is uncertain but needs blood work to see what her renal function is on the ENtresto .  She was admitted to [facility] for HF and atypical chest pain . Had mild increase in Troponin but it was not apparetnly trended .

## 2024-03-21 NOTE — PHYSICAL EXAM
[General Appearance - Well Developed] : well developed [Normal Appearance] : normal appearance [Well Groomed] : well groomed [General Appearance - Well Nourished] : well nourished [No Deformities] : no deformities [General Appearance - In No Acute Distress] : no acute distress [Eyelids - No Xanthelasma] : the eyelids demonstrated no xanthelasmas [Normal Conjunctiva] : the conjunctiva exhibited no abnormalities [Normal Oral Mucosa] : normal oral mucosa [No Oral Pallor] : no oral pallor [No Oral Cyanosis] : no oral cyanosis [Normal Jugular Venous A Waves Present] : normal jugular venous A waves present [No Jugular Venous Baez A Waves] : no jugular venous baez A waves [Normal Jugular Venous V Waves Present] : normal jugular venous V waves present [Respiration, Rhythm And Depth] : normal respiratory rhythm and effort [Auscultation Breath Sounds / Voice Sounds] : lungs were clear to auscultation bilaterally [Exaggerated Use Of Accessory Muscles For Inspiration] : no accessory muscle use [Heart Rate And Rhythm] : heart rate and rhythm were normal [Heart Sounds] : normal S1 and S2 [Murmurs] : no murmurs present [Abdomen Soft] : soft [Bowel Sounds] : normal bowel sounds [Abdomen Mass (___ Cm)] : no abdominal mass palpated [FreeTextEntry1] : severe pain in knees  difficult walking.  [Nail Clubbing] : no clubbing of the fingernails [Cyanosis, Localized] : no localized cyanosis [Petechial Hemorrhages (___cm)] : no petechial hemorrhages [Oriented To Time, Place, And Person] : oriented to person, place, and time [] : no ischemic changes [Affect] : the affect was normal [Mood] : the mood was normal [Normal Rate] : normal [No Anxiety] : not feeling anxious [II] : a grade 2 [No Pitting Edema] : no pitting edema present [1+] : left 1+

## 2024-03-21 NOTE — CARDIOLOGY SUMMARY
[de-identified] : 12- Atrial paced rhyth IVCD LBBB morphology  3- NSR LBBB  8- Atrial paced  LBBB  3- Atrialp[aced LBBB  2-6-2024 NSR  LBBB vs ventriucalr paced  11-7-2023 NSR LBBB  09/29/2022: SR, LBBB vs ventricular pacing .  7- Atrail paced beats with LBBB vs paced beats  3- NSR LBBB .  1- NSR LBBB [de-identified] : 3-7-2022 EF 25-30% moderate MR Mod TR  9- EF 40-45% Severe concnetric LVH  2- From [facility] EF 45% mild to mod Global hypokinesis . mild to mod MR mild to mod TR .  EF 45% mild to mod MR mod TR RVSP was 56 mmmhg  12- EF 50%  RVSP was 35 mmhg  [___] : [unfilled] [LVEF ___%] : LVEF [unfilled]%

## 2024-03-21 NOTE — HISTORY OF PRESENT ILLNESS
[FreeTextEntry1] : The patient was again admiited to [facility] with SOB and CHF which was mild at least radiograpohically  She improved with diuretics . She was started on Entresto again . Creatinine is 4.1 and she has a pre renal component. Tropnin was mildly increased but does not appear to be trended . She has not no SOB at this time but is not feeling well.  She has some dizziness and pain in her abdomen . She had some diarrhea as well.

## 2024-03-21 NOTE — REASON FOR VISIT
[Symptom and Test Evaluation] : symptom and test evaluation [Arrhythmia/ECG Abnorrmalities] : arrhythmia/ECG abnormalities [Structural Heart and Valve Disease] : structural heart and valve disease [FreeTextEntry3] : Dr. Celeste  [Follow-Up - Clinic] : a clinic follow-up of [Chest Pain] : chest pain [Cardiomyopathy] : cardiomyopathy [Hyperlipidemia] : hyperlipidemia [Hypertension] : hypertension [Discharge Date: ___] : Discharge Date: [unfilled] [Admitted for Heart Failure] : patient was not admitted for heart failure [Follow-Up: _____] : a [unfilled] follow-up visit [Formal Caregiver] : formal caregiver

## 2024-03-21 NOTE — ED PROVIDER NOTE - CLINICAL SUMMARY MEDICAL DECISION MAKING FREE TEXT BOX
88-year-old female presents emergency department for worsening mental status.  Patient was found to have OMERO.  Patient admitted for further evaluation and management.

## 2024-03-21 NOTE — ED PROVIDER NOTE - ATTENDING APP SHARED VISIT CONTRIBUTION OF CARE
89 yo F with PMH of HTN, DVT on eliquis, CHF, CKD currently on Macrobid for UTI (pt was admitted to [facility] on 3/11, unknown discharge date) presents to the ED for fluctuating mental status. Pt states that she feels week, has continued to have dysuria and has CP. According to the grandson patient is normally alert and oriented x3 and has good cognition but since her hospitalization she has not been herself. Tonight they tried to get her ready for bed and she was combative in trying to get her in her pajamas so he brought her to the ED. No fevers, chills or vomiting. Pt states she is having some nausea.     Const: No apparent distress  Eyes: PERRL, no conjunctival injection  HENT:  Neck supple without meningismus   CV: RRR, Warm, well-perfused extremities  RESP: CTA B/L, no tachypnea   GI: soft, non-tender, non-distended  MSK: No gross deformities appreciated  Skin: Warm, dry. No rashes  Neuro: Alert and oriented x3, CNs II-XII grossly intact. Sensation and motor function of extremities grossly intact.  Psych: Appropriate mood and affect.

## 2024-03-21 NOTE — ED ADULT TRIAGE NOTE - CHIEF COMPLAINT QUOTE
Pt came c/o chest pain and SOB, also c/o confusion and generalized weakness, last well known is 21:00 yesterday. Alert and oriented x 3 in triage.

## 2024-03-21 NOTE — ED PROVIDER NOTE - PROGRESS NOTE DETAILS
Dr. Shepard: grandson is leaving. If any additional information is needed we can contact her daughter, Melvi at [phone].

## 2024-03-21 NOTE — ED PROVIDER NOTE - OBJECTIVE STATEMENT
87 yo female w a pmhx of CHF, HTN, CKD, DVT on Eliquis, presents to the ED for evaluation. Paitient w/ generalized weakness x 1 week after being discharge from [facility] where she was admitted for UTI. Patient was discharged with macrobid which she took as prescribed. Assocaited nausea no vomiting and mid sternal chest pain. Per family tonight patient was intermittently confused, giving trouble when trying to help her change for bed. No fever ,chills, diarrhea, SOB, falls, HA.

## 2024-03-22 DIAGNOSIS — N17.9 ACUTE KIDNEY FAILURE, UNSPECIFIED: ICD-10-CM

## 2024-03-22 LAB
A1C WITH ESTIMATED AVERAGE GLUCOSE RESULT: 6.4 % — HIGH (ref 4–5.6)
ALBUMIN SERPL ELPH-MCNC: 3.3 G/DL — LOW (ref 3.5–5.2)
ALBUMIN SERPL ELPH-MCNC: 3.7 G/DL — SIGNIFICANT CHANGE UP (ref 3.5–5.2)
ALBUMIN SERPL ELPH-MCNC: 3.9 G/DL
ALP BLD-CCNC: 126 U/L
ALP SERPL-CCNC: 121 U/L — HIGH (ref 30–115)
ALP SERPL-CCNC: 129 U/L — HIGH (ref 30–115)
ALT FLD-CCNC: 33 U/L — SIGNIFICANT CHANGE UP (ref 0–41)
ALT FLD-CCNC: 38 U/L — SIGNIFICANT CHANGE UP (ref 0–41)
ALT SERPL-CCNC: 37 U/L
AMMONIA BLD-MCNC: 24 UMOL/L — SIGNIFICANT CHANGE UP (ref 11–55)
ANION GAP SERPL CALC-SCNC: 13 MMOL/L — SIGNIFICANT CHANGE UP (ref 7–14)
ANION GAP SERPL CALC-SCNC: 14 MMOL/L — SIGNIFICANT CHANGE UP (ref 7–14)
ANION GAP SERPL CALC-SCNC: 16 MMOL/L — HIGH (ref 7–14)
ANION GAP SERPL CALC-SCNC: 17 MMOL/L
APPEARANCE UR: CLEAR — SIGNIFICANT CHANGE UP
APPEARANCE UR: CLEAR — SIGNIFICANT CHANGE UP
APTT BLD: 37.2 SEC — SIGNIFICANT CHANGE UP (ref 27–39.2)
AST SERPL-CCNC: 36 U/L — SIGNIFICANT CHANGE UP (ref 0–41)
AST SERPL-CCNC: 43 U/L
AST SERPL-CCNC: 53 U/L — HIGH (ref 0–41)
BACTERIA # UR AUTO: NEGATIVE /HPF — SIGNIFICANT CHANGE UP
BASE EXCESS BLDV CALC-SCNC: -5.4 MMOL/L — LOW (ref -2–3)
BASOPHILS # BLD AUTO: 0.01 K/UL
BASOPHILS # BLD AUTO: 0.02 K/UL — SIGNIFICANT CHANGE UP (ref 0–0.2)
BASOPHILS NFR BLD AUTO: 0.1 %
BASOPHILS NFR BLD AUTO: 0.2 % — SIGNIFICANT CHANGE UP (ref 0–1)
BILIRUB SERPL-MCNC: 0.2 MG/DL
BILIRUB SERPL-MCNC: 0.2 MG/DL — SIGNIFICANT CHANGE UP (ref 0.2–1.2)
BILIRUB SERPL-MCNC: 0.2 MG/DL — SIGNIFICANT CHANGE UP (ref 0.2–1.2)
BILIRUB UR-MCNC: NEGATIVE — SIGNIFICANT CHANGE UP
BILIRUB UR-MCNC: NEGATIVE — SIGNIFICANT CHANGE UP
BLD GP AB SCN SERPL QL: SIGNIFICANT CHANGE UP
BUN SERPL-MCNC: 107 MG/DL — CRITICAL HIGH (ref 10–20)
BUN SERPL-MCNC: 117 MG/DL
BUN SERPL-MCNC: 119 MG/DL — CRITICAL HIGH (ref 10–20)
BUN SERPL-MCNC: 119 MG/DL — CRITICAL HIGH (ref 10–20)
CA-I SERPL-SCNC: 1.13 MMOL/L — LOW (ref 1.15–1.33)
CALCIUM SERPL-MCNC: 7.9 MG/DL — LOW (ref 8.4–10.4)
CALCIUM SERPL-MCNC: 8.1 MG/DL — LOW (ref 8.4–10.4)
CALCIUM SERPL-MCNC: 8.5 MG/DL — SIGNIFICANT CHANGE UP (ref 8.4–10.5)
CALCIUM SERPL-MCNC: 8.9 MG/DL
CAST: 0 /LPF — SIGNIFICANT CHANGE UP (ref 0–4)
CHLORIDE SERPL-SCNC: 89 MMOL/L
CHLORIDE SERPL-SCNC: 92 MMOL/L — LOW (ref 98–110)
CHLORIDE SERPL-SCNC: 94 MMOL/L — LOW (ref 98–110)
CHLORIDE SERPL-SCNC: 94 MMOL/L — LOW (ref 98–110)
CHOLEST SERPL-MCNC: 144 MG/DL — SIGNIFICANT CHANGE UP
CHOLEST SERPL-MCNC: 162 MG/DL
CO2 SERPL-SCNC: 16 MMOL/L — LOW (ref 17–32)
CO2 SERPL-SCNC: 18 MMOL/L
CO2 SERPL-SCNC: 19 MMOL/L — SIGNIFICANT CHANGE UP (ref 17–32)
CO2 SERPL-SCNC: 19 MMOL/L — SIGNIFICANT CHANGE UP (ref 17–32)
COLOR SPEC: YELLOW — SIGNIFICANT CHANGE UP
COLOR SPEC: YELLOW — SIGNIFICANT CHANGE UP
CREAT ?TM UR-MCNC: 42 MG/DL — SIGNIFICANT CHANGE UP
CREAT SERPL-MCNC: 4.8 MG/DL — CRITICAL HIGH (ref 0.7–1.5)
CREAT SERPL-MCNC: 4.8 MG/DL — CRITICAL HIGH (ref 0.7–1.5)
CREAT SERPL-MCNC: 5 MG/DL — CRITICAL HIGH (ref 0.7–1.5)
CREAT SERPL-MCNC: 5.09 MG/DL
DIFF PNL FLD: NEGATIVE — SIGNIFICANT CHANGE UP
DIFF PNL FLD: NEGATIVE — SIGNIFICANT CHANGE UP
EGFR: 8 ML/MIN/1.73M2
EGFR: 8 ML/MIN/1.73M2 — LOW
EOSINOPHIL # BLD AUTO: 0.12 K/UL
EOSINOPHIL # BLD AUTO: 0.23 K/UL — SIGNIFICANT CHANGE UP (ref 0–0.7)
EOSINOPHIL NFR BLD AUTO: 1.4 %
EOSINOPHIL NFR BLD AUTO: 2.8 % — SIGNIFICANT CHANGE UP (ref 0–8)
ESTIMATED AVERAGE GLUCOSE: 117 MG/DL
ESTIMATED AVERAGE GLUCOSE: 137 MG/DL — HIGH (ref 68–114)
FERRITIN SERPL-MCNC: 859 NG/ML
FOLATE SERPL-MCNC: >20 NG/ML
GAS PNL BLDV: 123 MMOL/L — LOW (ref 136–145)
GAS PNL BLDV: SIGNIFICANT CHANGE UP
GAS PNL BLDV: SIGNIFICANT CHANGE UP
GLUCOSE SERPL-MCNC: 111 MG/DL — HIGH (ref 70–99)
GLUCOSE SERPL-MCNC: 121 MG/DL
GLUCOSE SERPL-MCNC: 161 MG/DL — HIGH (ref 70–99)
GLUCOSE SERPL-MCNC: 84 MG/DL — SIGNIFICANT CHANGE UP (ref 70–99)
GLUCOSE UR QL: NEGATIVE MG/DL — SIGNIFICANT CHANGE UP
GLUCOSE UR QL: NEGATIVE MG/DL — SIGNIFICANT CHANGE UP
HBA1C MFR BLD HPLC: 5.7 %
HCO3 BLDV-SCNC: 21 MMOL/L — LOW (ref 22–29)
HCT VFR BLD CALC: 30.4 % — LOW (ref 37–47)
HCT VFR BLD CALC: 32.9 %
HCT VFR BLDA CALC: 29 % — LOW (ref 34.5–46.5)
HDLC SERPL-MCNC: 39 MG/DL — LOW
HDLC SERPL-MCNC: 41 MG/DL
HGB BLD CALC-MCNC: 9.8 G/DL — LOW (ref 11.7–16.1)
HGB BLD-MCNC: 10 G/DL — LOW (ref 12–16)
HGB BLD-MCNC: 10.7 G/DL
IMM GRANULOCYTES NFR BLD AUTO: 1.5 %
IMM GRANULOCYTES NFR BLD AUTO: 1.5 % — HIGH (ref 0.1–0.3)
INR BLD: 1.43 RATIO — HIGH (ref 0.65–1.3)
IRON SATN MFR SERPL: 43 %
IRON SERPL-MCNC: 112 UG/DL
KETONES UR-MCNC: NEGATIVE MG/DL — SIGNIFICANT CHANGE UP
KETONES UR-MCNC: NEGATIVE MG/DL — SIGNIFICANT CHANGE UP
LACTATE BLDV-MCNC: 0.9 MMOL/L — SIGNIFICANT CHANGE UP (ref 0.5–2)
LDLC SERPL CALC-MCNC: 96 MG/DL
LEUKOCYTE ESTERASE UR-ACNC: NEGATIVE — SIGNIFICANT CHANGE UP
LEUKOCYTE ESTERASE UR-ACNC: NEGATIVE — SIGNIFICANT CHANGE UP
LIDOCAIN IGE QN: 92 U/L — HIGH (ref 7–60)
LIPID PNL WITH DIRECT LDL SERPL: 81 MG/DL — SIGNIFICANT CHANGE UP
LYMPHOCYTES # BLD AUTO: 1.79 K/UL — SIGNIFICANT CHANGE UP (ref 1.2–3.4)
LYMPHOCYTES # BLD AUTO: 1.86 K/UL
LYMPHOCYTES # BLD AUTO: 21.7 % — SIGNIFICANT CHANGE UP (ref 20.5–51.1)
LYMPHOCYTES NFR BLD AUTO: 21 %
MAGNESIUM SERPL-MCNC: 2.2 MG/DL — SIGNIFICANT CHANGE UP (ref 1.8–2.4)
MAGNESIUM SERPL-MCNC: 2.3 MG/DL — SIGNIFICANT CHANGE UP (ref 1.8–2.4)
MAN DIFF?: NORMAL
MCHC RBC-ENTMCNC: 28.6 PG
MCHC RBC-ENTMCNC: 28.7 PG — SIGNIFICANT CHANGE UP (ref 27–31)
MCHC RBC-ENTMCNC: 32.5 GM/DL
MCHC RBC-ENTMCNC: 32.9 G/DL — SIGNIFICANT CHANGE UP (ref 32–37)
MCV RBC AUTO: 87.4 FL — SIGNIFICANT CHANGE UP (ref 81–99)
MCV RBC AUTO: 88 FL
MONOCYTES # BLD AUTO: 0.82 K/UL
MONOCYTES # BLD AUTO: 1.16 K/UL — HIGH (ref 0.1–0.6)
MONOCYTES NFR BLD AUTO: 14.1 % — HIGH (ref 1.7–9.3)
MONOCYTES NFR BLD AUTO: 9.3 %
NEUTROPHILS # BLD AUTO: 4.91 K/UL — SIGNIFICANT CHANGE UP (ref 1.4–6.5)
NEUTROPHILS # BLD AUTO: 5.91 K/UL
NEUTROPHILS NFR BLD AUTO: 59.7 % — SIGNIFICANT CHANGE UP (ref 42.2–75.2)
NEUTROPHILS NFR BLD AUTO: 66.7 %
NITRITE UR-MCNC: NEGATIVE — SIGNIFICANT CHANGE UP
NITRITE UR-MCNC: NEGATIVE — SIGNIFICANT CHANGE UP
NON HDL CHOLESTEROL: 105 MG/DL — SIGNIFICANT CHANGE UP
NONHDLC SERPL-MCNC: 121 MG/DL
NRBC # BLD: 0 /100 WBCS — SIGNIFICANT CHANGE UP (ref 0–0)
NT-PROBNP SERPL-MCNC: ABNORMAL PG/ML
NT-PROBNP SERPL-SCNC: HIGH PG/ML (ref 0–300)
OSMOLALITY UR: 301 MOS/KG — SIGNIFICANT CHANGE UP (ref 50–1200)
PCO2 BLDV: 42 MMHG — SIGNIFICANT CHANGE UP (ref 39–42)
PH BLDV: 7.3 — LOW (ref 7.32–7.43)
PH UR: 5 — SIGNIFICANT CHANGE UP (ref 5–8)
PH UR: 6 — SIGNIFICANT CHANGE UP (ref 5–8)
PHOSPHATE SERPL-MCNC: 4.2 MG/DL — SIGNIFICANT CHANGE UP (ref 2.1–4.9)
PLATELET # BLD AUTO: 347 K/UL — SIGNIFICANT CHANGE UP (ref 130–400)
PLATELET # BLD AUTO: 407 K/UL
PMV BLD: 9 FL — SIGNIFICANT CHANGE UP (ref 7.4–10.4)
PO2 BLDV: 35 MMHG — SIGNIFICANT CHANGE UP (ref 25–45)
POTASSIUM BLDV-SCNC: 4.9 MMOL/L — SIGNIFICANT CHANGE UP (ref 3.5–5.1)
POTASSIUM SERPL-MCNC: 4.9 MMOL/L — SIGNIFICANT CHANGE UP (ref 3.5–5)
POTASSIUM SERPL-MCNC: 4.9 MMOL/L — SIGNIFICANT CHANGE UP (ref 3.5–5)
POTASSIUM SERPL-MCNC: 6.5 MMOL/L — CRITICAL HIGH (ref 3.5–5)
POTASSIUM SERPL-SCNC: 4.9 MMOL/L
POTASSIUM SERPL-SCNC: 4.9 MMOL/L — SIGNIFICANT CHANGE UP (ref 3.5–5)
POTASSIUM SERPL-SCNC: 4.9 MMOL/L — SIGNIFICANT CHANGE UP (ref 3.5–5)
POTASSIUM SERPL-SCNC: 6.5 MMOL/L — CRITICAL HIGH (ref 3.5–5)
POTASSIUM UR-SCNC: 13 MMOL/L — SIGNIFICANT CHANGE UP
PROT ?TM UR-MCNC: 70 MG/DLG/24H — SIGNIFICANT CHANGE UP
PROT SERPL-MCNC: 5.9 G/DL — LOW (ref 6–8)
PROT SERPL-MCNC: 6.9 G/DL
PROT SERPL-MCNC: 7.1 G/DL — SIGNIFICANT CHANGE UP (ref 6–8)
PROT UR-MCNC: 100 MG/DL
PROT UR-MCNC: 100 MG/DL
PROT/CREAT UR-RTO: 1.7 RATIO — HIGH (ref 0–0.2)
PROTHROM AB SERPL-ACNC: 16.4 SEC — HIGH (ref 9.95–12.87)
RBC # BLD: 3.48 M/UL — LOW (ref 4.2–5.4)
RBC # BLD: 3.74 M/UL
RBC # FLD: 13.2 % — SIGNIFICANT CHANGE UP (ref 11.5–14.5)
RBC # FLD: 13.7 %
RBC CASTS # UR COMP ASSIST: 2 /HPF — SIGNIFICANT CHANGE UP (ref 0–4)
SAO2 % BLDV: 66.1 % — LOW (ref 67–88)
SODIUM SERPL-SCNC: 124 MMOL/L
SODIUM SERPL-SCNC: 124 MMOL/L — LOW (ref 135–146)
SODIUM SERPL-SCNC: 126 MMOL/L — LOW (ref 135–146)
SODIUM SERPL-SCNC: 127 MMOL/L — LOW (ref 135–146)
SODIUM UR-SCNC: 47 MMOL/L — SIGNIFICANT CHANGE UP
SP GR SPEC: 1.01 — SIGNIFICANT CHANGE UP (ref 1–1.03)
SP GR SPEC: 1.02 — SIGNIFICANT CHANGE UP (ref 1–1.03)
SQUAMOUS # UR AUTO: 3 /HPF — SIGNIFICANT CHANGE UP (ref 0–5)
T4 FREE SERPL-MCNC: 1.1 NG/DL
T4 FREE+ TSH PNL SERPL: 1.78 UIU/ML — SIGNIFICANT CHANGE UP (ref 0.27–4.2)
TIBC SERPL-MCNC: 258 UG/DL
TRIGL SERPL-MCNC: 118 MG/DL — SIGNIFICANT CHANGE UP
TRIGL SERPL-MCNC: 139 MG/DL
TROPONIN T, HIGH SENSITIVITY RESULT: 71 NG/L — CRITICAL HIGH (ref 6–13)
TROPONIN T, HIGH SENSITIVITY RESULT: 74 NG/L — CRITICAL HIGH (ref 6–13)
TROPONIN T, HIGH SENSITIVITY RESULT: 78 NG/L — CRITICAL HIGH (ref 6–13)
TSH SERPL-ACNC: 2.2 UIU/ML
UIBC SERPL-MCNC: 146 UG/DL
UROBILINOGEN FLD QL: 0.2 MG/DL — SIGNIFICANT CHANGE UP (ref 0.2–1)
UROBILINOGEN FLD QL: 0.2 MG/DL — SIGNIFICANT CHANGE UP (ref 0.2–1)
UUN UR-MCNC: 406 MG/DL — SIGNIFICANT CHANGE UP
VIT B12 SERPL-MCNC: 1670 PG/ML
WBC # BLD: 8.23 K/UL — SIGNIFICANT CHANGE UP (ref 4.8–10.8)
WBC # FLD AUTO: 8.23 K/UL — SIGNIFICANT CHANGE UP (ref 4.8–10.8)
WBC # FLD AUTO: 8.85 K/UL
WBC UR QL: 2 /HPF — SIGNIFICANT CHANGE UP (ref 0–5)

## 2024-03-22 PROCEDURE — 84466 ASSAY OF TRANSFERRIN: CPT

## 2024-03-22 PROCEDURE — 74018 RADEX ABDOMEN 1 VIEW: CPT

## 2024-03-22 PROCEDURE — 86803 HEPATITIS C AB TEST: CPT

## 2024-03-22 PROCEDURE — 85027 COMPLETE CBC AUTOMATED: CPT

## 2024-03-22 PROCEDURE — 86850 RBC ANTIBODY SCREEN: CPT

## 2024-03-22 PROCEDURE — 86900 BLOOD TYPING SEROLOGIC ABO: CPT

## 2024-03-22 PROCEDURE — 85025 COMPLETE CBC W/AUTO DIFF WBC: CPT

## 2024-03-22 PROCEDURE — 83935 ASSAY OF URINE OSMOLALITY: CPT

## 2024-03-22 PROCEDURE — 86704 HEP B CORE ANTIBODY TOTAL: CPT

## 2024-03-22 PROCEDURE — 93284 PRGRMG EVAL IMPLANTABLE DFB: CPT

## 2024-03-22 PROCEDURE — 36415 COLL VENOUS BLD VENIPUNCTURE: CPT

## 2024-03-22 PROCEDURE — 71045 X-RAY EXAM CHEST 1 VIEW: CPT

## 2024-03-22 PROCEDURE — 74177 CT ABD & PELVIS W/CONTRAST: CPT | Mod: 26,MC

## 2024-03-22 PROCEDURE — 83970 ASSAY OF PARATHORMONE: CPT

## 2024-03-22 PROCEDURE — 82728 ASSAY OF FERRITIN: CPT

## 2024-03-22 PROCEDURE — 85610 PROTHROMBIN TIME: CPT

## 2024-03-22 PROCEDURE — 87340 HEPATITIS B SURFACE AG IA: CPT

## 2024-03-22 PROCEDURE — 97530 THERAPEUTIC ACTIVITIES: CPT | Mod: GP

## 2024-03-22 PROCEDURE — 93284 PRGRMG EVAL IMPLANTABLE DFB: CPT | Mod: 26

## 2024-03-22 PROCEDURE — 84100 ASSAY OF PHOSPHORUS: CPT

## 2024-03-22 PROCEDURE — 0225U NFCT DS DNA&RNA 21 SARSCOV2: CPT

## 2024-03-22 PROCEDURE — 90935 HEMODIALYSIS ONE EVALUATION: CPT

## 2024-03-22 PROCEDURE — 83550 IRON BINDING TEST: CPT

## 2024-03-22 PROCEDURE — 80076 HEPATIC FUNCTION PANEL: CPT

## 2024-03-22 PROCEDURE — 82140 ASSAY OF AMMONIA: CPT

## 2024-03-22 PROCEDURE — 81001 URINALYSIS AUTO W/SCOPE: CPT

## 2024-03-22 PROCEDURE — 82306 VITAMIN D 25 HYDROXY: CPT

## 2024-03-22 PROCEDURE — 83036 HEMOGLOBIN GLYCOSYLATED A1C: CPT

## 2024-03-22 PROCEDURE — 93010 ELECTROCARDIOGRAM REPORT: CPT | Mod: 76

## 2024-03-22 PROCEDURE — C1750: CPT

## 2024-03-22 PROCEDURE — 86706 HEP B SURFACE ANTIBODY: CPT

## 2024-03-22 PROCEDURE — 84133 ASSAY OF URINE POTASSIUM: CPT

## 2024-03-22 PROCEDURE — 80053 COMPREHEN METABOLIC PANEL: CPT

## 2024-03-22 PROCEDURE — C9113: CPT

## 2024-03-22 PROCEDURE — 86901 BLOOD TYPING SEROLOGIC RH(D): CPT

## 2024-03-22 PROCEDURE — 82570 ASSAY OF URINE CREATININE: CPT

## 2024-03-22 PROCEDURE — 85730 THROMBOPLASTIN TIME PARTIAL: CPT

## 2024-03-22 PROCEDURE — 76937 US GUIDE VASCULAR ACCESS: CPT

## 2024-03-22 PROCEDURE — 82310 ASSAY OF CALCIUM: CPT

## 2024-03-22 PROCEDURE — 87040 BLOOD CULTURE FOR BACTERIA: CPT

## 2024-03-22 PROCEDURE — 97110 THERAPEUTIC EXERCISES: CPT | Mod: GP

## 2024-03-22 PROCEDURE — 84145 PROCALCITONIN (PCT): CPT

## 2024-03-22 PROCEDURE — 84300 ASSAY OF URINE SODIUM: CPT

## 2024-03-22 PROCEDURE — 99222 1ST HOSP IP/OBS MODERATE 55: CPT

## 2024-03-22 PROCEDURE — 82803 BLOOD GASES ANY COMBINATION: CPT

## 2024-03-22 PROCEDURE — 84156 ASSAY OF PROTEIN URINE: CPT

## 2024-03-22 PROCEDURE — 84540 ASSAY OF URINE/UREA-N: CPT

## 2024-03-22 PROCEDURE — 87086 URINE CULTURE/COLONY COUNT: CPT

## 2024-03-22 PROCEDURE — 70450 CT HEAD/BRAIN W/O DYE: CPT | Mod: 26,MC

## 2024-03-22 PROCEDURE — 80074 ACUTE HEPATITIS PANEL: CPT

## 2024-03-22 PROCEDURE — 82652 VIT D 1 25-DIHYDROXY: CPT

## 2024-03-22 PROCEDURE — 36558 INSERT TUNNELED CV CATH: CPT | Mod: RT

## 2024-03-22 PROCEDURE — 97116 GAIT TRAINING THERAPY: CPT | Mod: GP

## 2024-03-22 PROCEDURE — 84443 ASSAY THYROID STIM HORMONE: CPT

## 2024-03-22 PROCEDURE — 83605 ASSAY OF LACTIC ACID: CPT

## 2024-03-22 PROCEDURE — 80061 LIPID PANEL: CPT

## 2024-03-22 PROCEDURE — 83735 ASSAY OF MAGNESIUM: CPT

## 2024-03-22 PROCEDURE — 77001 FLUOROGUIDE FOR VEIN DEVICE: CPT

## 2024-03-22 PROCEDURE — 93005 ELECTROCARDIOGRAM TRACING: CPT

## 2024-03-22 PROCEDURE — 83540 ASSAY OF IRON: CPT

## 2024-03-22 PROCEDURE — 80048 BASIC METABOLIC PNL TOTAL CA: CPT

## 2024-03-22 PROCEDURE — 97162 PT EVAL MOD COMPLEX 30 MIN: CPT | Mod: GP

## 2024-03-22 PROCEDURE — 84484 ASSAY OF TROPONIN QUANT: CPT

## 2024-03-22 PROCEDURE — C1769: CPT

## 2024-03-22 RX ORDER — SODIUM ZIRCONIUM CYCLOSILICATE 10 G/10G
10 POWDER, FOR SUSPENSION ORAL ONCE
Refills: 0 | Status: COMPLETED | OUTPATIENT
Start: 2024-03-22 | End: 2024-03-22

## 2024-03-22 RX ORDER — ISOSORBIDE MONONITRATE 60 MG/1
1 TABLET, EXTENDED RELEASE ORAL
Qty: 0 | Refills: 0 | DISCHARGE

## 2024-03-22 RX ORDER — VALSARTAN 80 MG/1
1 TABLET ORAL
Refills: 0 | DISCHARGE

## 2024-03-22 RX ORDER — DOCUSATE SODIUM 100 MG
2 CAPSULE ORAL
Qty: 0 | Refills: 0 | DISCHARGE

## 2024-03-22 RX ORDER — SACUBITRIL AND VALSARTAN 24; 26 MG/1; MG/1
1 TABLET, FILM COATED ORAL
Refills: 0 | Status: DISCONTINUED | OUTPATIENT
Start: 2024-03-22 | End: 2024-03-22

## 2024-03-22 RX ORDER — ALBUTEROL 90 UG/1
2 AEROSOL, METERED ORAL EVERY 6 HOURS
Refills: 0 | Status: DISCONTINUED | OUTPATIENT
Start: 2024-03-22 | End: 2024-04-06

## 2024-03-22 RX ORDER — SERTRALINE 25 MG/1
25 TABLET, FILM COATED ORAL DAILY
Refills: 0 | Status: DISCONTINUED | OUTPATIENT
Start: 2024-03-22 | End: 2024-04-06

## 2024-03-22 RX ORDER — DRONABINOL 2.5 MG
1 CAPSULE ORAL
Qty: 0 | Refills: 0 | DISCHARGE

## 2024-03-22 RX ORDER — FOLIC ACID 0.8 MG
1 TABLET ORAL DAILY
Refills: 0 | Status: DISCONTINUED | OUTPATIENT
Start: 2024-03-22 | End: 2024-04-06

## 2024-03-22 RX ORDER — METOPROLOL TARTRATE 50 MG
1 TABLET ORAL
Qty: 0 | Refills: 0 | DISCHARGE

## 2024-03-22 RX ORDER — SODIUM CHLORIDE 9 MG/ML
500 INJECTION, SOLUTION INTRAVENOUS ONCE
Refills: 0 | Status: DISCONTINUED | OUTPATIENT
Start: 2024-03-22 | End: 2024-03-22

## 2024-03-22 RX ORDER — METOPROLOL TARTRATE 50 MG
50 TABLET ORAL DAILY
Refills: 0 | Status: DISCONTINUED | OUTPATIENT
Start: 2024-03-22 | End: 2024-04-06

## 2024-03-22 RX ORDER — AMLODIPINE BESYLATE 2.5 MG/1
5 TABLET ORAL AT BEDTIME
Refills: 0 | Status: DISCONTINUED | OUTPATIENT
Start: 2024-03-22 | End: 2024-03-31

## 2024-03-22 RX ORDER — CLOPIDOGREL BISULFATE 75 MG/1
75 TABLET, FILM COATED ORAL DAILY
Refills: 0 | Status: DISCONTINUED | OUTPATIENT
Start: 2024-03-22 | End: 2024-03-22

## 2024-03-22 RX ORDER — SODIUM CHLORIDE 9 MG/ML
1000 INJECTION, SOLUTION INTRAVENOUS
Refills: 0 | Status: DISCONTINUED | OUTPATIENT
Start: 2024-03-22 | End: 2024-03-23

## 2024-03-22 RX ORDER — MIRTAZAPINE 45 MG/1
1 TABLET, ORALLY DISINTEGRATING ORAL
Qty: 0 | Refills: 0 | DISCHARGE

## 2024-03-22 RX ORDER — CALCIUM ACETATE 667 MG
1334 TABLET ORAL EVERY 12 HOURS
Refills: 0 | Status: DISCONTINUED | OUTPATIENT
Start: 2024-03-22 | End: 2024-04-03

## 2024-03-22 RX ORDER — METRONIDAZOLE 500 MG
500 TABLET ORAL EVERY 8 HOURS
Refills: 0 | Status: DISCONTINUED | OUTPATIENT
Start: 2024-03-22 | End: 2024-03-22

## 2024-03-22 RX ORDER — ISOSORBIDE MONONITRATE 60 MG/1
30 TABLET, EXTENDED RELEASE ORAL DAILY
Refills: 0 | Status: DISCONTINUED | OUTPATIENT
Start: 2024-03-22 | End: 2024-04-06

## 2024-03-22 RX ORDER — FUROSEMIDE 40 MG
40 TABLET ORAL DAILY
Refills: 0 | Status: DISCONTINUED | OUTPATIENT
Start: 2024-03-22 | End: 2024-03-22

## 2024-03-22 RX ORDER — APIXABAN 2.5 MG/1
2.5 TABLET, FILM COATED ORAL EVERY 12 HOURS
Refills: 0 | Status: DISCONTINUED | OUTPATIENT
Start: 2024-03-22 | End: 2024-03-29

## 2024-03-22 RX ORDER — ATORVASTATIN CALCIUM 80 MG/1
80 TABLET, FILM COATED ORAL AT BEDTIME
Refills: 0 | Status: DISCONTINUED | OUTPATIENT
Start: 2024-03-22 | End: 2024-04-06

## 2024-03-22 RX ORDER — METRONIDAZOLE 500 MG
TABLET ORAL
Refills: 0 | Status: DISCONTINUED | OUTPATIENT
Start: 2024-03-22 | End: 2024-03-22

## 2024-03-22 RX ORDER — MECLIZINE HCL 12.5 MG
1 TABLET ORAL
Qty: 0 | Refills: 0 | DISCHARGE

## 2024-03-22 RX ORDER — MONTELUKAST 4 MG/1
10 TABLET, CHEWABLE ORAL DAILY
Refills: 0 | Status: DISCONTINUED | OUTPATIENT
Start: 2024-03-22 | End: 2024-04-06

## 2024-03-22 RX ORDER — METRONIDAZOLE 500 MG
500 TABLET ORAL ONCE
Refills: 0 | Status: COMPLETED | OUTPATIENT
Start: 2024-03-22 | End: 2024-03-22

## 2024-03-22 RX ORDER — LATANOPROST 0.05 MG/ML
1 SOLUTION/ DROPS OPHTHALMIC; TOPICAL
Qty: 0 | Refills: 0 | DISCHARGE

## 2024-03-22 RX ORDER — ALLOPURINOL 300 MG
1 TABLET ORAL
Qty: 0 | Refills: 0 | DISCHARGE

## 2024-03-22 RX ADMIN — Medication 1334 MILLIGRAM(S): at 06:53

## 2024-03-22 RX ADMIN — APIXABAN 2.5 MILLIGRAM(S): 2.5 TABLET, FILM COATED ORAL at 17:08

## 2024-03-22 RX ADMIN — ISOSORBIDE MONONITRATE 30 MILLIGRAM(S): 60 TABLET, EXTENDED RELEASE ORAL at 11:56

## 2024-03-22 RX ADMIN — Medication 50 MILLIGRAM(S): at 06:53

## 2024-03-22 RX ADMIN — SODIUM ZIRCONIUM CYCLOSILICATE 10 GRAM(S): 10 POWDER, FOR SUSPENSION ORAL at 03:34

## 2024-03-22 RX ADMIN — ATORVASTATIN CALCIUM 80 MILLIGRAM(S): 80 TABLET, FILM COATED ORAL at 21:37

## 2024-03-22 RX ADMIN — SODIUM CHLORIDE 50 MILLILITER(S): 9 INJECTION, SOLUTION INTRAVENOUS at 16:45

## 2024-03-22 RX ADMIN — CLOPIDOGREL BISULFATE 75 MILLIGRAM(S): 75 TABLET, FILM COATED ORAL at 11:56

## 2024-03-22 RX ADMIN — SERTRALINE 25 MILLIGRAM(S): 25 TABLET, FILM COATED ORAL at 11:56

## 2024-03-22 RX ADMIN — Medication 1 MILLIGRAM(S): at 11:56

## 2024-03-22 RX ADMIN — MONTELUKAST 10 MILLIGRAM(S): 4 TABLET, CHEWABLE ORAL at 11:56

## 2024-03-22 RX ADMIN — Medication 1334 MILLIGRAM(S): at 17:08

## 2024-03-22 RX ADMIN — AMLODIPINE BESYLATE 5 MILLIGRAM(S): 2.5 TABLET ORAL at 21:37

## 2024-03-22 RX ADMIN — APIXABAN 2.5 MILLIGRAM(S): 2.5 TABLET, FILM COATED ORAL at 06:53

## 2024-03-22 NOTE — H&P ADULT - ASSESSMENT
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 Pt states that she feels week, has continued to have dysuria and has CP. According to the grandson patient is normally alert and oriented x3 and has good cognition but since her hospitalization she has not been herself. Tonight they tried to get her ready for bed and she was combative in trying to get her in her pajamas so he brought her to the ED. No fevers, chills or vomiting. Pt states she is having some nausea.     ED vitals normal. labs showed hb 10 (at BL), Na 127, K 6.5 hemolyzed --> 4.9 on vbg, AG 16, BUN/CRea 119/4.8, , AST 53,   trop 74 --> 78, pro BNP 12.9K   EKG: NSR, vpaced, new TWI in inferolateral leads     CT abd pelv: 1. Prominent fluid-filled loops of small bowel with mild mucosal enhancement, may represent enteritis in the appropriate clinical setting.  2. The urinary bladder is thickened consistent with patient's current urinary tract infection.  Other chronic/incidental findings as above.  CTH: No acute intracranial pathology.    patient received levaquin and was admitted to telemetry for AMS and chest pain w/u and management.       #MISC  - DVT PPx:  - GI PPx:  - Diet:  - Activity:  - Labs:  - Code:   - Dispo:     - Medrec:  87yo  female patient ([employer]) with hx of HTN, DVT/PE 2018 (thought provoked by travel) completed 6m of AC then 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, NICM - HFrEF(LVEF 35-40% in 9/22) s/p AICD, RA, CKD stage 5   --> currently on Macrobid for UTI (pt was discharge from [facility] 3/11 papers in her chart)  presents to the ED for fluctuating mental status and abdominal pain with occasional CP.  PAtient states she has lower abdominal pain with persistent dysuria. She says she had CP but doesn't remember when and how it was.  Per the ED note, patient's grandson said she is normally AAOx3 with good cognition but since her hospitalization she has not been herself. Tonight they tried to get her ready for bed and she was combative in trying to get her in her pajamas so he brought her to the ED.   patient received levaquin and was admitted to telemetry for AMS and chest pain w/u and management.       # Cystitis   # transient confusion, most likely metabolic   # OMERO on CKD stage 5 - suspected prerenal   was on Macrobid for UTI (pt was discharge from [facility] 3/11 papers in her chart)  ED vitals normal.   Na 127, K 6.5 hemolyzed --> 4.9 on vbg, AG 16, BUN/CRea 119/4.8  CT abd pelv: 1. Prominent fluid-filled loops of small bowel with mild mucosal enhancement, may represent enteritis in the appropriate clinical setting.  2. The urinary bladder is thickened consistent with patient's current urinary tract infection.  CTH: No acute intracranial pathology.  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 need to confirm code status in the am with her and the family   - Dispo: tele    - Medrec: confirmed with [facility] d/c medrec   please check with the family why the patient was started on plavix in [facility]?  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  PAtient states she has lower abdominal pain with persistent dysuria. She says she had CP but doesn't remember when and how it was.  Per the ED note, patient's grandson said she is normally AAOx3 with good cognition but since her hospitalization she has not been herself. Tonight they tried to get her ready for bed and she was combative in trying to get her in her pajamas so he brought her to the ED.   patient received levaquin and was admitted to telemetry for AMS and chest pain w/u and management.     # enteritis  # Cystitis   # transient confusion, most likely metabolic   # OMERO on CKD stage 5 - suspected prerenal   was on Macrobid for UTI (pt was discharge from [facility] 3/11 papers in her chart)  ED vitals normal.   Na 127, K 6.5 hemolyzed --> 4.9 on vbg, AG 16, BUN/CRea 119/4.8  CT abd pelv: 1. Prominent fluid-filled loops of small bowel with mild mucosal enhancement, may represent enteritis in the appropriate clinical setting.  2. The urinary bladder is thickened consistent with patient's current urinary tract infection.  CTH: No acute intracranial pathology.  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 need to confirm code status in the am with her and the family   - Dispo: tele    - Medrec: confirmed with [facility] d/c medrec   please check with the family why the patient was started on plavix in [facility]?

## 2024-03-22 NOTE — PHYSICAL THERAPY INITIAL EVALUATION ADULT - GAIT TRAINING, PT EVAL
Goal: pt will ambulate w/ rollator 100 feet with supervision by discharge to facilitate return to PLOF.

## 2024-03-22 NOTE — H&P ADULT - HISTORY OF PRESENT ILLNESS
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 Pt states that she feels week, has continued to have dysuria and has CP. According to the grandson patient is normally alert and oriented x3 and has good cognition but since her hospitalization she has not been herself. Tonight they tried to get her ready for bed and she was combative in trying to get her in her pajamas so he brought her to the ED. No fevers, chills or vomiting. Pt states she is having some nausea.     ED vitals normal. labs showed hb 10 (at BL), Na 127, K 6.5 hemolyzed --> 4.9 on vbg, AG 16, BUN/CRea 119/4.8, , AST 53,   trop 74 --> 78, pro BNP 12.9K   EKG: NSR, vpaced, new TWI in inferolateral leads     CT abd pelv: 1. Prominent fluid-filled loops of small bowel with mild mucosal enhancement, may represent enteritis in the appropriate clinical setting.  2. The urinary bladder is thickened consistent with patient's current urinary tract infection.  Other chronic/incidental findings as above.  CTH: No acute intracranial pathology.    patient received levaquin and was admitted to telemetry for AMS and chest pain w/u and management.  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  PAtient states she has lower abdominal pain with persistent dysuria. She says she had CP but doesn't remember when and how it was.  Per the ED note, patient's grandson said she is normally AAOx3 with good cognition but since her hospitalization she has not been herself. Tonight they tried to get her ready for bed and she was combative in trying to get her in her pajamas so he brought her to the ED.     ED vitals normal. labs showed hb 10 (at BL), Na 127, K 6.5 hemolyzed --> 4.9 on vbg, AG 16, BUN/CRea 119/4.8, , AST 53,   trop 74 --> 78, pro BNP 12.9K   EKG: NSR, vpaced, new TWI in inferolateral leads     CT abd pelv: 1. Prominent fluid-filled loops of small bowel with mild mucosal enhancement, may represent enteritis in the appropriate clinical setting.  2. The urinary bladder is thickened consistent with patient's current urinary tract infection.  Other chronic/incidental findings as above.  CTH: No acute intracranial pathology.    patient received levaquin and was admitted to telemetry for AMS and chest pain w/u and management.

## 2024-03-22 NOTE — PHYSICAL THERAPY INITIAL EVALUATION ADULT - PERTINENT HX OF CURRENT PROBLEM, REHAB EVAL
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 Currently on Macrobid for UTI (pt was discharge from [facility] 3/11 papers in her chart) presents to the ED for fluctuating mental status and abdominal pain with occasional CP.  Nephrology was consulted for OMERO over CKD stage 5

## 2024-03-22 NOTE — CONSULT NOTE ADULT - ATTENDING COMMENTS
Has advanced CKD  in and out of hopsital  over past months (usualy RUM)  Cr somewhat  labile CRS due to CKD+CHF  Agree w/ above plan gentle hydration then reassess

## 2024-03-22 NOTE — CONSULT NOTE ADULT - SUBJECTIVE AND OBJECTIVE BOX
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 Currently on Macrobid for UTI (pt was discharge from [facility] 3/11 papers in her chart) presents to the ED for fluctuating mental status and abdominal pain with occasional CP. Patient states she has lower abdominal pain with persistent dysuria. She says she had CP but doesn't remember when and how it was. Per the ED note, patient's grandson said she is normally AAOx3 with good cognition but since her hospitalization she has not been herself. Tonight they tried to get her ready for bed and she was combative in trying to get her in her pajamas so he brought her to the ED. Nephrology was consulted for OMERO over CKD stage 5     PAST MEDICAL & SURGICAL HISTORY:  Chronic kidney disease  Hypertension  Gout  Diverticulitis  sigmoid  Rheumatoid arthritis  DVT, lower extremity  Bilateral  Pulmonary embolism  Chronic HFrEF (heart failure with reduced ejection fraction)  AICD (automatic cardioverter/defibrillator) present  Artificial pacemaker  History of appendectomy  History of tonsillectomy  History of hysterectomy  H/O hernia repair    Allergies:  Keflex (Swelling)  cephalosporins (Angioedema)    Home Medications Reviewed  Hospital Medications:   MEDICATIONS  (STANDING):  amLODIPine   Tablet 5 milliGRAM(s) Oral at bedtime  apixaban 2.5 milliGRAM(s) Oral every 12 hours  atorvastatin 80 milliGRAM(s) Oral at bedtime  calcium acetate 1334 milliGRAM(s) Oral every 12 hours  clopidogrel Tablet 75 milliGRAM(s) Oral daily  folic acid 1 milliGRAM(s) Oral daily  isosorbide   mononitrate ER Tablet (IMDUR) 30 milliGRAM(s) Oral daily  metoprolol succinate ER 50 milliGRAM(s) Oral daily  montelukast 10 milliGRAM(s) Oral daily  sertraline 25 milliGRAM(s) Oral daily    SOCIAL HISTORY:  Denies ETOH,Smoking,   FAMILY HISTORY:  FH: arthritis  sister        REVIEW OF SYSTEMS:    Did not answer     VITALS:  Vital Signs Last 24 Hrs  T(C): 36.6 (22 Mar 2024 07:29), Max: 36.6 (21 Mar 2024 21:58)  T(F): 97.8 (22 Mar 2024 07:29), Max: 97.9 (21 Mar 2024 21:58)  HR: 60 (22 Mar 2024 07:29) (60 - 60)  BP: 126/59 (22 Mar 2024 07:29) (125/60 - 131/58)  BP(mean): 85 (22 Mar 2024 07:29) (84 - 85)  RR: 18 (22 Mar 2024 07:29) (18 - 20)  SpO2: 100% (22 Mar 2024 07:29) (98% - 100%)    Parameters below as of 22 Mar 2024 07:29  Patient On (Oxygen Delivery Method): room air        03-21 @ 07:01  -  03-22 @ 07:00  --------------------------------------------------------  IN: 0 mL / OUT: 250 mL / NET: -250 mL        PHYSICAL EXAM:    Respiratory: CTAB, no wheezes, rales or rhonchi  Cardiovascular: S1, S2, RRR  Gastrointestinal: BS+, soft, NT/ND  Extremities: No cyanosis or clubbing. No peripheral edema        LABS:  03-22    124<L>  |  94<L>  |  119<HH>  ----------------------------<  84  4.9   |  16<L>  |  4.8<HH>    Ca    8.1<L>      22 Mar 2024 06:14  Phos  4.2     03-22  Mg     2.2     03-22    TPro  5.9<L>  /  Alb  3.3<L>  /  TBili  0.2  /  DBili      /  AST  36  /  ALT  33  /  AlkPhos  121<H>  03-22    Creatinine Trend: 4.8 <--, 4.8 <--                        10.0   8.23  )-----------( 347      ( 22 Mar 2024 06:14 )             30.4     Urine Studies:  Urinalysis Basic - ( 22 Mar 2024 06:14 )    Color:  / Appearance:  / SG:  / pH:   Gluc: 84 mg/dL / Ketone:   / Bili:  / Urobili:    Blood:  / Protein:  / Nitrite:    Leuk Esterase:  / RBC:  / WBC    Sq Epi:  / Non Sq Epi:  / Bacteria:         RADIOLOGY & ADDITIONAL STUDIES:    CTAP    IMPRESSION:    1. Prominent fluid-filled loops of small bowel with mild mucosal enhancement, may represent enteritis in the appropriate clinical setting.    2. The urinary bladder is thickened consistent with patient's current urinary tract infection.    Other chronic/incidental findings as above.

## 2024-03-22 NOTE — CONSULT NOTE ADULT - ATTENDING SUPERVISION STATEMENT
Report given to Will at [place].  Transfer paperwork completed.     Patient left the department via wheelchair accompanied by police in satisfactory condition.    Fellow

## 2024-03-22 NOTE — H&P ADULT - NSICDXPASTMEDICALHX_GEN_ALL_CORE_FT
PAST MEDICAL HISTORY:  AICD (automatic cardioverter/defibrillator) present     Chronic HFrEF (heart failure with reduced ejection fraction)     Chronic kidney disease     Diverticulitis sigmoid    DVT, lower extremity Bilateral    Gout     Hypertension     Pulmonary embolism     Rheumatoid arthritis

## 2024-03-22 NOTE — CONSULT NOTE ADULT - ASSESSMENT
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 Currently on Macrobid for UTI (pt was discharge from [facility] 3/11 papers in her chart) presents to the ED for fluctuating mental status and abdominal pain with occasional CP.  Nephrology was consulted for OMERO over CKD stage 5     OMERO on CKD stage 5 (from pre-renal )  - Poor oral intake due to AMS and loss of appetite   - Cr 4.8 on admission and  baseline around 3   - Not hyperkalemic  - IV fluid hydration preferably isotonic bicarb at 50 ml/hr for 24 hours ( 1 liter D5W and 150 meq of sodium bicarbonate) . Patient stated that she doesnt have appetite. Encourage oral intake  - Non-oliguric. Monitor I/O and UOP  - Hold home torsemide for today and will have to be resumed as per volume status. Looks dry on examination today  - BMP Q 6 hrly   - Cr may worsen as contrast study was done on admission  - CTAP shows cystitis. C/w antibiotics   - Get urine electrolytes and urine osmolarity  - Avoid nephrotoxic medications  - Renally dose antibiotics   - Nephrology will follow

## 2024-03-22 NOTE — PHYSICAL THERAPY INITIAL EVALUATION ADULT - NSPTDISCHREC_GEN_A_CORE
Patient requires assistance with functional mobility. Based on today's evaluation, PT recommends D/C to home with assistance vs rehabilitation facility (dependent on functional outcomes/pending stair evaluation)

## 2024-03-22 NOTE — ED ADULT NURSE REASSESSMENT NOTE - NS ED NURSE REASSESS COMMENT FT1
Patient A&Ox4, breathing with ease, no signs of acute distress, no complaints at this time, will continue to monitor and assess

## 2024-03-22 NOTE — H&P ADULT - NSHPPHYSICALEXAM_GEN_ALL_CORE
GENERAL: NAD, well-developed.  HEAD:  Atraumatic, Normocephalic.  EYES: conjunctiva and sclera clear  CHEST/LUNG: GBAE. No wheezing or crackles   HEART: regular rate and rhythm; S1/S2.  ABDOMEN: Soft, Nontender, Nondistended  EXTREMITIES: No edema.   PSYCH: AAOx3.  NEUROLOGY: non-focal; moves all extremities GENERAL: NAD, well-developed.  HEAD:  Atraumatic, Normocephalic.  EYES: conjunctiva and sclera clear  CHEST/LUNG: GBAE. No wheezing or crackles   HEART: regular rate and rhythm; S1/S2.  ABDOMEN: Soft, lower abd pain tenderness   EXTREMITIES: +1 BLE pitting edema.   PSYCH: AAOx3.  NEUROLOGY: non-focal; moves all extremities

## 2024-03-22 NOTE — H&P ADULT - NSHPLABSRESULTS_GEN_ALL_CORE
10.1   7.96  )-----------( 356      ( 21 Mar 2024 23:01 )             31.1           127<L>  |  92<L>  |  119<HH>  ----------------------------<  111<H>  6.5<HH>   |  19  |  4.8<HH>    Ca    8.5      21 Mar 2024 23:01  Mg     2.3         TPro  7.1  /  Alb  3.7  /  TBili  0.2  /  DBili  x   /  AST  53<H>  /  ALT  38  /  AlkPhos  129<H>                Urinalysis Basic - ( 22 Mar 2024 01:38 )    Color: Yellow / Appearance: Clear / S.009 / pH: x  Gluc: x / Ketone: Negative mg/dL  / Bili: Negative / Urobili: 0.2 mg/dL   Blood: x / Protein: 100 mg/dL / Nitrite: Negative   Leuk Esterase: Negative / RBC: 2 /HPF / WBC 2 /HPF   Sq Epi: x / Non Sq Epi: 3 /HPF / Bacteria: Negative /HPF            Lactate Trend   @ 23:01 Lactate:0.9             CAPILLARY BLOOD GLUCOSE 10.1   7.96  )-----------( 356      ( 21 Mar 2024 23:01 )             31.1           127<L>  |  92<L>  |  119<HH>  ----------------------------<  111<H>  6.5<HH>   |  19  |  4.8<HH>    Ca    8.5      21 Mar 2024 23:01  Mg     2.3         TPro  7.1  /  Alb  3.7  /  TBili  0.2  /  DBili  x   /  AST  53<H>  /  ALT  38  /  AlkPhos  129<H>                Urinalysis Basic - ( 22 Mar 2024 01:38 )    Color: Yellow / Appearance: Clear / S.009 / pH: x  Gluc: x / Ketone: Negative mg/dL  / Bili: Negative / Urobili: 0.2 mg/dL   Blood: x / Protein: 100 mg/dL / Nitrite: Negative   Leuk Esterase: Negative / RBC: 2 /HPF / WBC 2 /HPF   Sq Epi: x / Non Sq Epi: 3 /HPF / Bacteria: Negative /HPF        Lactate Trend   @ 23:01 Lactate:0.9

## 2024-03-22 NOTE — PHYSICAL THERAPY INITIAL EVALUATION ADULT - GAIT DEVIATIONS NOTED, PT EVAL
decreased maude/increased time in double stance/decreased velocity of limb motion/decreased step length/decreased stride length

## 2024-03-22 NOTE — H&P ADULT - ATTENDING COMMENTS
patient seen and examined independently on morning rounds for the first t radha today in the ED, chart reviewed and discussed with medicine resident and agree with the above overnight H/P and assessment and plan with the following addendum:     in brief, 87 yo woman with h/o htn, DVT/PE in 2018 and in 2019, HFrEF s/p aicd and ckd V who p/w AMS and abdominal pain after being discharged from osh ([facility]) on 3/11 to complete macrobid abx course and now p/w ams ans, confusion and OMERO on CKD.    ROS- as per above otherwise review of systems unremarkable    PE:  GEN-NAD, AAOx2-3 (at time of exam was comfortable   PULM- Clear to auscultation bilaterally  CVS- +s1/s2 RRR   GI- soft NT ND +bs, no rebound, no guarding  EXT- no edema    labs/radiology reviewed    a/p:  #metabolic encephalopathy  #OMERO on ckd stage V--2/2 dehydration and poor oral intake  #resolving Cystitits  #diarrhea---Enteritis  #HFrEF--s/p ACID  #h/o DVT/PE--->continue eliquis 2.5 mg bid  -continue to monitor bmp closely  -f/u bcx and ucx- will hold restarting abx   -check stool study  -eps to interogate device  -nephrology consult0---hold nephrotoxic meds (hold entresto and lasix)  -f/u ua/ucr/ulytes  -primafit to monitor i/o    DVT/GI ppx  guarded prognosis    FULL CODE--continue to readdress goc

## 2024-03-22 NOTE — PHYSICAL THERAPY INITIAL EVALUATION ADULT - MD/RN NOTIFIED
Pt with hx of RA, well controlled per pt. Pt takes methotrexate 2.5mg every 2 weeks.  Last saw rheumatologist one month ago but is unsure of name. s/p b/l knee replacements  - obtain collateral.   - c/w methotrexate 2.5mg q 2 weeks (next dose tomm 5/24)
yes

## 2024-03-22 NOTE — CHART NOTE - NSCHARTNOTEFT_GEN_A_CORE
Electrophysiology    Pts device MDT BIV ICD (Claria MRI CRTD [id number]) was interrogated on 03-22-24  Dr. Baez - following EP physician.    Remaining longevity: 8.1 yrs    Device working properly.    LV lead turned OFF due to chronically high threshold values. Device programmed to AAI<=>DDD 60/130 b pm.  Patient is not dependent with underlying rhythm SB 58 bpm.     AP: 66.2%  : <0.1%    Events:  1 brief episode of tachycardia noted duration 1 sec with an average ventricular rate 150s bpm c/w PAT on 2/23/24.     Reviewed by attending Dr. Reese    Contact EP ACP with any questions [phone]

## 2024-03-23 LAB
ALBUMIN SERPL ELPH-MCNC: 3 G/DL — LOW (ref 3.5–5.2)
ALP SERPL-CCNC: 100 U/L — SIGNIFICANT CHANGE UP (ref 30–115)
ALT FLD-CCNC: 23 U/L — SIGNIFICANT CHANGE UP (ref 0–41)
ANION GAP SERPL CALC-SCNC: 14 MMOL/L — SIGNIFICANT CHANGE UP (ref 7–14)
ANION GAP SERPL CALC-SCNC: 15 MMOL/L — HIGH (ref 7–14)
AST SERPL-CCNC: 28 U/L — SIGNIFICANT CHANGE UP (ref 0–41)
BASOPHILS # BLD AUTO: 0.02 K/UL — SIGNIFICANT CHANGE UP (ref 0–0.2)
BASOPHILS NFR BLD AUTO: 0.3 % — SIGNIFICANT CHANGE UP (ref 0–1)
BILIRUB SERPL-MCNC: <0.2 MG/DL — SIGNIFICANT CHANGE UP (ref 0.2–1.2)
BUN SERPL-MCNC: 107 MG/DL — CRITICAL HIGH (ref 10–20)
BUN SERPL-MCNC: 99 MG/DL — CRITICAL HIGH (ref 10–20)
CALCIUM SERPL-MCNC: 7.9 MG/DL — LOW (ref 8.4–10.5)
CALCIUM SERPL-MCNC: 7.9 MG/DL — LOW (ref 8.4–10.5)
CHLORIDE SERPL-SCNC: 93 MMOL/L — LOW (ref 98–110)
CHLORIDE SERPL-SCNC: 94 MMOL/L — LOW (ref 98–110)
CO2 SERPL-SCNC: 20 MMOL/L — SIGNIFICANT CHANGE UP (ref 17–32)
CO2 SERPL-SCNC: 20 MMOL/L — SIGNIFICANT CHANGE UP (ref 17–32)
CREAT SERPL-MCNC: 4.7 MG/DL — CRITICAL HIGH (ref 0.7–1.5)
CREAT SERPL-MCNC: 5 MG/DL — CRITICAL HIGH (ref 0.7–1.5)
EGFR: 8 ML/MIN/1.73M2 — LOW
EGFR: 8 ML/MIN/1.73M2 — LOW
EOSINOPHIL # BLD AUTO: 0.22 K/UL — SIGNIFICANT CHANGE UP (ref 0–0.7)
EOSINOPHIL NFR BLD AUTO: 3.3 % — SIGNIFICANT CHANGE UP (ref 0–8)
GLUCOSE SERPL-MCNC: 106 MG/DL — HIGH (ref 70–99)
GLUCOSE SERPL-MCNC: 95 MG/DL — SIGNIFICANT CHANGE UP (ref 70–99)
HCT VFR BLD CALC: 27.7 % — LOW (ref 37–47)
HGB BLD-MCNC: 9.1 G/DL — LOW (ref 12–16)
IMM GRANULOCYTES NFR BLD AUTO: 1.1 % — HIGH (ref 0.1–0.3)
LYMPHOCYTES # BLD AUTO: 1.64 K/UL — SIGNIFICANT CHANGE UP (ref 1.2–3.4)
LYMPHOCYTES # BLD AUTO: 24.7 % — SIGNIFICANT CHANGE UP (ref 20.5–51.1)
MAGNESIUM SERPL-MCNC: 2.1 MG/DL — SIGNIFICANT CHANGE UP (ref 1.8–2.4)
MCHC RBC-ENTMCNC: 28.2 PG — SIGNIFICANT CHANGE UP (ref 27–31)
MCHC RBC-ENTMCNC: 32.9 G/DL — SIGNIFICANT CHANGE UP (ref 32–37)
MCV RBC AUTO: 85.8 FL — SIGNIFICANT CHANGE UP (ref 81–99)
MONOCYTES # BLD AUTO: 0.98 K/UL — HIGH (ref 0.1–0.6)
MONOCYTES NFR BLD AUTO: 14.7 % — HIGH (ref 1.7–9.3)
NEUTROPHILS # BLD AUTO: 3.72 K/UL — SIGNIFICANT CHANGE UP (ref 1.4–6.5)
NEUTROPHILS NFR BLD AUTO: 55.9 % — SIGNIFICANT CHANGE UP (ref 42.2–75.2)
NRBC # BLD: 0 /100 WBCS — SIGNIFICANT CHANGE UP (ref 0–0)
OSMOLALITY UR: 273 MOS/KG — SIGNIFICANT CHANGE UP (ref 50–1200)
PLATELET # BLD AUTO: 315 K/UL — SIGNIFICANT CHANGE UP (ref 130–400)
PMV BLD: 9.2 FL — SIGNIFICANT CHANGE UP (ref 7.4–10.4)
POTASSIUM SERPL-MCNC: 5 MMOL/L — SIGNIFICANT CHANGE UP (ref 3.5–5)
POTASSIUM SERPL-MCNC: 5.1 MMOL/L — HIGH (ref 3.5–5)
POTASSIUM SERPL-SCNC: 5 MMOL/L — SIGNIFICANT CHANGE UP (ref 3.5–5)
POTASSIUM SERPL-SCNC: 5.1 MMOL/L — HIGH (ref 3.5–5)
POTASSIUM UR-SCNC: 16 MMOL/L — SIGNIFICANT CHANGE UP
PROT SERPL-MCNC: 5.4 G/DL — LOW (ref 6–8)
RBC # BLD: 3.23 M/UL — LOW (ref 4.2–5.4)
RBC # FLD: 13.4 % — SIGNIFICANT CHANGE UP (ref 11.5–14.5)
SODIUM SERPL-SCNC: 127 MMOL/L — LOW (ref 135–146)
SODIUM SERPL-SCNC: 129 MMOL/L — LOW (ref 135–146)
SODIUM UR-SCNC: 36 MMOL/L — SIGNIFICANT CHANGE UP
WBC # BLD: 6.65 K/UL — SIGNIFICANT CHANGE UP (ref 4.8–10.8)
WBC # FLD AUTO: 6.65 K/UL — SIGNIFICANT CHANGE UP (ref 4.8–10.8)

## 2024-03-23 PROCEDURE — 99232 SBSQ HOSP IP/OBS MODERATE 35: CPT

## 2024-03-23 PROCEDURE — 71045 X-RAY EXAM CHEST 1 VIEW: CPT | Mod: 26

## 2024-03-23 RX ORDER — SODIUM CHLORIDE 9 MG/ML
1000 INJECTION INTRAMUSCULAR; INTRAVENOUS; SUBCUTANEOUS
Refills: 0 | Status: DISCONTINUED | OUTPATIENT
Start: 2024-03-23 | End: 2024-03-24

## 2024-03-23 RX ORDER — SODIUM BICARBONATE 1 MEQ/ML
650 SYRINGE (ML) INTRAVENOUS EVERY 12 HOURS
Refills: 0 | Status: DISCONTINUED | OUTPATIENT
Start: 2024-03-23 | End: 2024-04-03

## 2024-03-23 RX ADMIN — Medication 650 MILLIGRAM(S): at 22:22

## 2024-03-23 RX ADMIN — ISOSORBIDE MONONITRATE 30 MILLIGRAM(S): 60 TABLET, EXTENDED RELEASE ORAL at 12:22

## 2024-03-23 RX ADMIN — ATORVASTATIN CALCIUM 80 MILLIGRAM(S): 80 TABLET, FILM COATED ORAL at 22:22

## 2024-03-23 RX ADMIN — Medication 1334 MILLIGRAM(S): at 17:04

## 2024-03-23 RX ADMIN — Medication 50 MILLIGRAM(S): at 05:25

## 2024-03-23 RX ADMIN — APIXABAN 2.5 MILLIGRAM(S): 2.5 TABLET, FILM COATED ORAL at 17:04

## 2024-03-23 RX ADMIN — SODIUM CHLORIDE 50 MILLILITER(S): 9 INJECTION INTRAMUSCULAR; INTRAVENOUS; SUBCUTANEOUS at 17:44

## 2024-03-23 RX ADMIN — AMLODIPINE BESYLATE 5 MILLIGRAM(S): 2.5 TABLET ORAL at 22:22

## 2024-03-23 RX ADMIN — APIXABAN 2.5 MILLIGRAM(S): 2.5 TABLET, FILM COATED ORAL at 05:25

## 2024-03-23 RX ADMIN — Medication 1334 MILLIGRAM(S): at 05:25

## 2024-03-23 RX ADMIN — Medication 1 MILLIGRAM(S): at 12:22

## 2024-03-23 RX ADMIN — MONTELUKAST 10 MILLIGRAM(S): 4 TABLET, CHEWABLE ORAL at 12:22

## 2024-03-23 RX ADMIN — SERTRALINE 25 MILLIGRAM(S): 25 TABLET, FILM COATED ORAL at 12:22

## 2024-03-23 NOTE — PROGRESS NOTE ADULT - ASSESSMENT
a/p:  in brief, 89 yo woman with h/o htn, DVT/PE in 2018 and in 2019, HFrEF s/p aicd and ckd V who p/w AMS and abdominal pain after being discharged from osh ([facility]) on 3/11 to complete macrobid abx course and now p/w ams and confusion with OMERO on CKD5    a/p:  #metabolic encephalopathy  #OMERO on ckd stage V--2/2 dehydration and poor oral intake  #resolved Cystitits  #diarrhea---Enteritis  #HFrEF--s/p Aicd  #h/o DVT/PE  -tele monitoring  -continue to monitor bmp closely--bun/cr 107/4.7  -nephrology following---no acute indication for RRT at this time   -hold nephrotoxic meds  -closely monitor vs-----can hold additional bp meds if becomes hypotensive (both entresto and lasix on hold---restart as cr and bp tolerates)  -continue ivf with hc03  -f/u bcx and ucx- will hold restarting abx   -check stool study  -eps for device interrogation  -continue eliquis 2.5 mg bid  -f/u ua/ucr/ulytes  -primafit to monitor and document I/o    DVT/GI ppx  guarded prognosis    FULL CODE--continue to readdress [facility]    patient is jehovah witness- refuses blood transfusion and all blood products    Total time spent to complete patient's bedside assessment, review medical chart, discuss medical plan of care with covering medical team was more than 35 minutes  with >50% of time spendt face to face with patient, discussion with patient/family and/or coordination of care

## 2024-03-23 NOTE — ED ADULT NURSE NOTE - NSSEPSISSUSPECTED_ED_A_ED
This is a 4y11m Male POD 1 s/p Left Sprengels Deformity correction.  [ ] History per: patient and father  [ ]  utilized, number:     INTERVAL/OVERNIGHT EVENTS: Patient still on PCA for pain control. Appears to be in moderate pain of the left arm and neck. Slept well throughout the night. Tolerating PO. Patient is also reporting cough that causes referred belly pain. Good urine output. No bowel movement reported.    MEDICATIONS  (STANDING):  diazepam  Oral Liquid - Peds 2.5 milliGRAM(s) Oral every 8 hours  morphine PCA (1 mG/mL) - Peds 30 milliLiter(s) PCA Continuous PCA Continuous  sodium chloride 0.9%. - Pediatric 1000 milliLiter(s) (60 mL/Hr) IV Continuous <Continuous>    MEDICATIONS  (PRN):  acetaminophen   Oral Liquid - Peds 240 milliGRAM(s) Oral every 6 hours PRN For Temp greater than 38 C (100.4 F)  acetaminophen   Oral Liquid - Peds. 240 milliGRAM(s) Oral every 6 hours PRN Mild Pain (1 - 3)  dexamethasone IV Intermittent - Pediatric 4 milliGRAM(s) IV Intermittent every 6 hours PRN Nausea, IF ondansetron is ineffective after 30 - 60 minutes  naloxone  IntraVenous Injection - Peds 0.02 milliGRAM(s) IV Push every 3 minutes PRN For ANY of the following changes in patient status:  A. RR less than 10 breaths/min, B. Oxygen saturation less than 90%, C. Sedation score of 6  oxyCODONE   Oral Liquid - Peds 2.1 milliGRAM(s) Oral every 6 hours PRN Moderate Pain (4 - 6)    Allergies    No Known Allergies    Intolerances        DIET:    [ ] There are no updates to the medical, surgical, social or family history unless described:    PATIENT CARE ACCESS DEVICES:  [ ] Peripheral IV  [ ] Central Venous Line, Date Placed:		Site/Device:  [ ] Urinary Catheter, Date Placed:  [ ] Necessity of urinary, arterial, and venous catheters discussed    REVIEW OF SYSTEMS: If not negative (Neg) please elaborate. History Per:   General: [ ] Neg  Pulmonary: [ ] + cough  Cardiac: [ ] Neg  Gastrointestinal: [ ] Neg  Ears, Nose, Throat: [ ] Neg  Renal/Urologic: [ ] Neg  Musculoskeletal: [ ] + left shoulder and arm pain  Endocrine: [ ] Neg  Hematologic: [ ] Neg  Neurologic: [ ] Neg  Allergy/Immunologic: [ ] Neg  All other systems reviewed and negative [ ]     VITAL SIGNS AND PHYSICAL EXAM:  Vital Signs Last 24 Hrs  T(C): 36.6 (12 Oct 2017 10:12), Max: 37.7 (12 Oct 2017 06:40)  T(F): 97.8 (12 Oct 2017 10:12), Max: 99.8 (12 Oct 2017 06:40)  HR: 136 (12 Oct 2017 10:12) (100 - 136)  BP: 112/67 (12 Oct 2017 10:12) (85/39 - 117/68)  BP(mean): --  RR: 22 (12 Oct 2017 10:12) (16 - 24)  SpO2: 95% (12 Oct 2017 10:12) (95% - 100%)  I&O's Summary    11 Oct 2017 07:01  -  12 Oct 2017 07:00  --------------------------------------------------------  IN: 1080 mL / OUT: 675 mL / NET: 405 mL    12 Oct 2017 07:01  -  12 Oct 2017 11:15  --------------------------------------------------------  IN: 417 mL / OUT: 300 mL / NET: 117 mL      Pain Score:  Daily Weight Gm: 98292 (11 Oct 2017 07:55)  BMI (kg/m2): 15.9 (10-11 @ 07:55)    Gen: mild distress due to pain, frowning, interactive, answering questions appropriately for age.   HEENT: NC/AT; AFOSF; pupils equal, responsive, reactive to light; no conjunctivitis or scleral icterus; no nasal discharge; no nasal congestion; oropharynx without exudates/erythema; mucus membranes moist  Neck: FROM, supple, no cervical lymphadenopathy  Chest: clear to auscultation bilaterally, no crackles/wheezes, good air entry, no tachypnea or retractions  CV: tachycardic, no murmurs   Abd: soft, nontender, nondistended, no HSM appreciated, NABS  : intact  Back: Spine dressing clean dry and intact. no vertebral or paraspinal tenderness along entire spine; no CVAT  LUE: In Sling. NVI in AIN M U R distribution, fingers wwp, arm soft and compressible. Drain in place   Neuro: grossly nonfocal, strength and tone grossly normal    INTERVAL LAB RESULTS:            INTERVAL IMAGING STUDIES: Yes This is a 4y11m Male POD 1 s/p Left Sprengels Deformity correction.  [ ] History per: patient and father  [ ]  utilized, number:     INTERVAL/OVERNIGHT EVENTS: Patient still on PCA for pain control. Appears to be in moderate pain of the left arm and neck. Slept well throughout the night. Tolerating PO. Patient is also reporting cough that causes referred belly pain. Good urine output. No bowel movement reported.    MEDICATIONS  (STANDING):  diazepam  Oral Liquid - Peds 2.5 milliGRAM(s) Oral every 8 hours  morphine PCA (1 mG/mL) - Peds 30 milliLiter(s) PCA Continuous PCA Continuous  sodium chloride 0.9%. - Pediatric 1000 milliLiter(s) (60 mL/Hr) IV Continuous <Continuous>    MEDICATIONS  (PRN):  acetaminophen   Oral Liquid - Peds 240 milliGRAM(s) Oral every 6 hours PRN For Temp greater than 38 C (100.4 F)  acetaminophen   Oral Liquid - Peds. 240 milliGRAM(s) Oral every 6 hours PRN Mild Pain (1 - 3)  dexamethasone IV Intermittent - Pediatric 4 milliGRAM(s) IV Intermittent every 6 hours PRN Nausea, IF ondansetron is ineffective after 30 - 60 minutes  naloxone  IntraVenous Injection - Peds 0.02 milliGRAM(s) IV Push every 3 minutes PRN For ANY of the following changes in patient status:  A. RR less than 10 breaths/min, B. Oxygen saturation less than 90%, C. Sedation score of 6  oxyCODONE   Oral Liquid - Peds 2.1 milliGRAM(s) Oral every 6 hours PRN Moderate Pain (4 - 6)    Allergies    No Known Allergies    Intolerances        DIET:    [ ] There are no updates to the medical, surgical, social or family history unless described:    PATIENT CARE ACCESS DEVICES:  [x ] Peripheral IV  [ ] Central Venous Line, Date Placed:		Site/Device:  [ ] Urinary Catheter, Date Placed:  [ ] Necessity of urinary, arterial, and venous catheters discussed    REVIEW OF SYSTEMS: If not negative (Neg) please elaborate. History Per:   General: [ ] Neg  Pulmonary: [ ] + cough  Cardiac: [ ] Neg  Gastrointestinal: [ ] Neg  Ears, Nose, Throat: [ ] Neg  Renal/Urologic: [ ] Neg  Musculoskeletal: [ ] + left shoulder and arm pain  Endocrine: [ ] Neg  Hematologic: [ ] Neg  Neurologic: [ ] Neg  Allergy/Immunologic: [ ] Neg  All other systems reviewed and negative [ ]     VITAL SIGNS AND PHYSICAL EXAM:  Vital Signs Last 24 Hrs  T(C): 36.6 (12 Oct 2017 10:12), Max: 37.7 (12 Oct 2017 06:40)  T(F): 97.8 (12 Oct 2017 10:12), Max: 99.8 (12 Oct 2017 06:40)  HR: 136 (12 Oct 2017 10:12) (100 - 136)  BP: 112/67 (12 Oct 2017 10:12) (85/39 - 117/68)  BP(mean): --  RR: 22 (12 Oct 2017 10:12) (16 - 24)  SpO2: 95% (12 Oct 2017 10:12) (95% - 100%)  I&O's Summary    11 Oct 2017 07:01  -  12 Oct 2017 07:00  --------------------------------------------------------  IN: 1080 mL / OUT: 675 mL / NET: 405 mL    12 Oct 2017 07:01  -  12 Oct 2017 11:15  --------------------------------------------------------  IN: 417 mL / OUT: 300 mL / NET: 117 mL      Pain Score:  Daily Weight Gm: 21104 (11 Oct 2017 07:55)  BMI (kg/m2): 15.9 (10-11 @ 07:55)    Gen: mild distress due to pain, frowning, interactive, answering questions appropriately for age.   HEENT: NC/AT; AFOSF; pupils equal, responsive, reactive to light; no conjunctivitis or scleral icterus; no nasal discharge; no nasal congestion; oropharynx without exudates/erythema; mucus membranes moist  Neck: FROM, supple, no cervical lymphadenopathy  Chest: clear to auscultation bilaterally, no crackles/wheezes, good air entry, no retractions, inc work of breathing due to splinting  CV: tachycardic, no murmurs   Abd: soft, nontender, mildly distended, no HSM appreciated, NABS  : intact  Back: Spine dressing clean dry and intact. no vertebral or paraspinal tenderness along entire spine; no CVAT  LUE: In Sling. NVI in AIN M U R distribution, fingers wwp, arm soft and compressible. Drain in place   Neuro: grossly nonfocal, strength and tone grossly normal    INTERVAL LAB RESULTS:            INTERVAL IMAGING STUDIES:

## 2024-03-23 NOTE — PROGRESS NOTE ADULT - ASSESSMENT
79 yo  female patient ([employer]) with hx of CKD stage 5, HTN, DVT/PE 2018 (thought provoked by travel) completed 6m of AC then 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, NICM - HFrEF(LVEF 35-40% in 9/22) s/p AICD, RA, CKD stage 5. Currently on Macrobid for UTI (pt was discharge from [facility] 3/11 papers in her chart) presents to the ED for fluctuating mental status and abdominal pain with occasional CP.  Nephrology was consulted for OMERO over CKD stage 5   OMERO on CKD stage 5 (from pre-renal )  - Poor oral intake due to AMS and loss of appetite   - Cr noted / trending down slowly  - Not hyperkalemic  - d/c amlodipine if bp remains on the low side  - no diuretics  - please document UO  -ph at goal no binders   - sodium level trending up/ check tsh, u osm > 100/ U sodium high / in favor of elevated PTH / clinically today euvolemic   - start sodium bicarbonate 650 q 12   - corrected calcium 8.6/ check vit D / PTH levels  no acute indication for RRT   will follow

## 2024-03-23 NOTE — PATIENT PROFILE ADULT - FUNCTIONAL ASSESSMENT - BASIC MOBILITY 6.
2-calculated by average/Not able to assess (calculate score using [facility] averaging method)

## 2024-03-23 NOTE — PROGRESS NOTE ADULT - SUBJECTIVE AND OBJECTIVE BOX
seen and examined  24 h events noted   no distress        PAST HISTORY  --------------------------------------------------------------------------------  No significant changes to PMH, PSH, FHx, SHx, unless otherwise noted    ALLERGIES & MEDICATIONS  --------------------------------------------------------------------------------  Allergies    Keflex (Swelling)  cephalosporins (Angioedema)    Intolerances      Standing Inpatient Medications  amLODIPine   Tablet 5 milliGRAM(s) Oral at bedtime  apixaban 2.5 milliGRAM(s) Oral every 12 hours  atorvastatin 80 milliGRAM(s) Oral at bedtime  calcium acetate 1334 milliGRAM(s) Oral every 12 hours  dextrose 5% 1000 milliLiter(s) IV Continuous <Continuous>  folic acid 1 milliGRAM(s) Oral daily  isosorbide   mononitrate ER Tablet (IMDUR) 30 milliGRAM(s) Oral daily  metoprolol succinate ER 50 milliGRAM(s) Oral daily  montelukast 10 milliGRAM(s) Oral daily  sertraline 25 milliGRAM(s) Oral daily    PRN Inpatient Medications  albuterol    90 MICROgram(s) HFA Inhaler 2 Puff(s) Inhalation every 6 hours PRN        VITALS/PHYSICAL EXAM  --------------------------------------------------------------------------------  T(C): 36.4 (03-23-24 @ 09:42), Max: 36.6 (03-22-24 @ 23:53)  HR: 61 (03-23-24 @ 09:42) (60 - 62)  BP: 133/64 (03-23-24 @ 09:42) (126/65 - 140/66)  RR: 18 (03-23-24 @ 09:42) (18 - 18)  SpO2: 100% (03-23-24 @ 07:34) (99% - 100%)  Wt(kg): --  Height (cm): 160 (03-23-24 @ 09:42)  Weight (kg): 57.1 (03-23-24 @ 09:42)  BMI (kg/m2): 22.3 (03-23-24 @ 09:42)  BSA (m2): 1.59 (03-23-24 @ 09:42)      Physical Exam:  	Gen: NAD,  	Pulm: decrease BS B/L               abd: distended  	    LABS/STUDIES  --------------------------------------------------------------------------------              9.1    6.65  >-----------<  315      [03-23-24 @ 05:58]              27.7     127  |  93  |  107  ----------------------------<  95      [03-23-24 @ 05:58]  5.1   |  20  |  4.7        Ca     7.9     [03-23-24 @ 05:58]      Mg     2.1     [03-23-24 @ 05:58]      Phos  4.2     [03-22-24 @ 06:14]    TPro  5.4  /  Alb  3.0  /  TBili  <0.2  /  DBili  x   /  AST  28  /  ALT  23  /  AlkPhos  100  [03-23-24 @ 05:58]    PT/INR: PT 16.40, INR 1.43       [03-22-24 @ 06:14]  PTT: 37.2       [03-22-24 @ 06:14]      Creatinine Trend:  SCr 4.7 [03-23 @ 05:58]  SCr 5.0 [03-22 @ 20:44]  SCr 4.8 [03-22 @ 06:14]  SCr 4.8 [03-21 @ 23:01]    Urinalysis - [03-23-24 @ 05:58]      Color  / Appearance  / SG  / pH       Gluc 95 / Ketone   / Bili  / Urobili        Blood  / Protein  / Leuk Est  / Nitrite       RBC  / WBC  / Hyaline  / Gran  / Sq Epi  / Non Sq Epi  / Bacteria     Urine Creatinine 42      [03-22-24 @ 21:20]  Urine Protein 70      [03-22-24 @ 21:20]  Urine Sodium 47.0      [03-22-24 @ 21:20]  Urine Urea Nitrogen 406      [03-22-24 @ 21:20]  Urine Potassium 13      [03-22-24 @ 21:20]  Urine Osmolality 301      [03-22-24 @ 21:20]    Lipid: chol 144, , HDL 39, LDL --      [03-22-24 @ 06:14]

## 2024-03-23 NOTE — PROGRESS NOTE ADULT - SUBJECTIVE AND OBJECTIVE BOX
Patient is a 88y old  Female who presents with a chief complaint of AMS (23 Mar 2024 11:30)    HPI:  89yo  female patient ([employer]) with hx of HTN, DVT/PE 2018 (thought provoked by travel) completed 6m of AC then 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, NICM - HFrEF(LVEF 35-40% in 9/22) s/p AICD, RA, CKD stage 5   --> currently on Macrobid for UTI (pt was discharge from [facility] 3/11 papers in her chart)  presents to the ED for fluctuating mental status and abdominal pain with occasional CP.  PAtient states she has lower abdominal pain with persistent dysuria. She says she had CP but doesn't remember when and how it was.  Per the ED note, patient's grandson said she is normally AAOx3 with good cognition but since her hospitalization she has not been herself. Tonight they tried to get her ready for bed and she was combative in trying to get her in her pajamas so he brought her to the ED. .  (22 Mar 2024 04:02)    PAST MEDICAL & SURGICAL HISTORY:  Chronic kidney disease  Hypertension  Gout  Diverticulitis  sigmoid  Rheumatoid arthritis  DVT, lower extremity  Bilateral  Pulmonary embolism  Chronic HFrEF (heart failure with reduced ejection fraction)  AICD (automatic cardioverter/defibrillator) present  Artificial pacemaker  History of appendectomy  History of tonsillectomy  History of hysterectomy  H/O hernia repair    patient seen and examined independently on morning rounds, chart reviewed and discussed with the medicine resident and on interdisciplinary rounds:    hospital day #1    no overnight events---having intermittent chronic dysuria    Vital Signs Last 24 Hrs  T(C): 36.4 (23 Mar 2024 13:25), Max: 36.6 (22 Mar 2024 23:53)  T(F): 97.5 (23 Mar 2024 13:25), Max: 97.8 (22 Mar 2024 23:53)  HR: 59 (23 Mar 2024 13:25) (59 - 62)  BP: 124/59 (23 Mar 2024 13:25) (124/59 - 140/66)  BP(mean): 95 (23 Mar 2024 07:34) (95 - 95)  RR: 18 (23 Mar 2024 13:25) (18 - 18)  SpO2: 100% (23 Mar 2024 07:34) (99% - 100%)    Parameters below as of 23 Mar 2024 07:34  Patient On (Oxygen Delivery Method): room air    PE:  GEN-NAD, AAOx2-3  PULM- CTAB fair air entry  CVS- +s1/s2 RRR   GI- soft NT obese ND +bs  EXT- trace edema                        9.1    6.65  )-----------( 315      ( 23 Mar 2024 05:58 )             27.7     03-23    127<L>  |  93<L>  |  107<HH>  ----------------------------<  95  5.1<H>   |  20  |  4.7<HH>    Ca    7.9<L>      23 Mar 2024 05:58  Phos  4.2     03-22  Mg     2.1     03-23    TPro  5.4<L>  /  Alb  3.0<L>  /  TBili  <0.2  /  DBili  x   /  AST  28  /  ALT  23  /  AlkPhos  100  03-23        Urinalysis Basic - ( 23 Mar 2024 05:58 )    Color: x / Appearance: x / SG: x / pH: x  Gluc: 95 mg/dL / Ketone: x  / Bili: x / Urobili: x   Blood: x / Protein: x / Nitrite: x   Leuk Esterase: x / RBC: x / WBC x   Sq Epi: x / Non Sq Epi: x / Bacteria: x        Culture - Blood (collected 21 Mar 2024 23:01)  Source: .Blood Blood  Preliminary Report (23 Mar 2024 07:02):    No growth at 24 hours        MEDICATIONS  (STANDING):  amLODIPine   Tablet 5 milliGRAM(s) Oral at bedtime  apixaban 2.5 milliGRAM(s) Oral every 12 hours  atorvastatin 80 milliGRAM(s) Oral at bedtime  calcium acetate 1334 milliGRAM(s) Oral every 12 hours  dextrose 5% 1000 milliLiter(s) (50 mL/Hr) IV Continuous <Continuous>  folic acid 1 milliGRAM(s) Oral daily  isosorbide   mononitrate ER Tablet (IMDUR) 30 milliGRAM(s) Oral daily  metoprolol succinate ER 50 milliGRAM(s) Oral daily  montelukast 10 milliGRAM(s) Oral daily  sertraline 25 milliGRAM(s) Oral daily

## 2024-03-23 NOTE — PATIENT PROFILE ADULT - PATIENT'S SEXUAL ORIENTATION
[Postpartum Follow Up] : postpartum follow up [Complications:___] : no complications [Delivery Date: ___] : on [unfilled] [] : delivered by vaginal delivery [Male] : Delivery History: baby boy [Wt. ___] : weighing [unfilled] [Breastfeeding] : not currently nursing [None] : The patient is currently asymptomatic [Back to Normal] : is back to normal in size [Normal] : the vagina was normal [de-identified] : birth control discussed Heterosexual

## 2024-03-24 LAB
CREAT ?TM UR-MCNC: 45 MG/DL — SIGNIFICANT CHANGE UP
CULTURE RESULTS: SIGNIFICANT CHANGE UP
HCT VFR BLD CALC: 29.9 % — LOW (ref 37–47)
HGB BLD-MCNC: 9.5 G/DL — LOW (ref 12–16)
MCHC RBC-ENTMCNC: 28.3 PG — SIGNIFICANT CHANGE UP (ref 27–31)
MCHC RBC-ENTMCNC: 31.8 G/DL — LOW (ref 32–37)
MCV RBC AUTO: 89 FL — SIGNIFICANT CHANGE UP (ref 81–99)
NRBC # BLD: 0 /100 WBCS — SIGNIFICANT CHANGE UP (ref 0–0)
PLATELET # BLD AUTO: 305 K/UL — SIGNIFICANT CHANGE UP (ref 130–400)
PMV BLD: 9.1 FL — SIGNIFICANT CHANGE UP (ref 7.4–10.4)
PROCALCITONIN SERPL-MCNC: 0.13 NG/ML — HIGH (ref 0.02–0.1)
PROT ?TM UR-MCNC: 108 MG/DLG/24H — SIGNIFICANT CHANGE UP
PROT/CREAT UR-RTO: 2.4 RATIO — HIGH (ref 0–0.2)
RBC # BLD: 3.36 M/UL — LOW (ref 4.2–5.4)
RBC # FLD: 13.7 % — SIGNIFICANT CHANGE UP (ref 11.5–14.5)
SPECIMEN SOURCE: SIGNIFICANT CHANGE UP
UUN UR-MCNC: 405 MG/DL — SIGNIFICANT CHANGE UP
WBC # BLD: 7.36 K/UL — SIGNIFICANT CHANGE UP (ref 4.8–10.8)
WBC # FLD AUTO: 7.36 K/UL — SIGNIFICANT CHANGE UP (ref 4.8–10.8)

## 2024-03-24 PROCEDURE — 99232 SBSQ HOSP IP/OBS MODERATE 35: CPT

## 2024-03-24 RX ORDER — SIMETHICONE 80 MG/1
80 TABLET, CHEWABLE ORAL DAILY
Refills: 0 | Status: DISCONTINUED | OUTPATIENT
Start: 2024-03-24 | End: 2024-03-30

## 2024-03-24 RX ORDER — SODIUM CHLORIDE 9 MG/ML
1000 INJECTION INTRAMUSCULAR; INTRAVENOUS; SUBCUTANEOUS
Refills: 0 | Status: DISCONTINUED | OUTPATIENT
Start: 2024-03-24 | End: 2024-03-26

## 2024-03-24 RX ADMIN — SIMETHICONE 80 MILLIGRAM(S): 80 TABLET, CHEWABLE ORAL at 04:47

## 2024-03-24 RX ADMIN — ATORVASTATIN CALCIUM 80 MILLIGRAM(S): 80 TABLET, FILM COATED ORAL at 21:33

## 2024-03-24 RX ADMIN — APIXABAN 2.5 MILLIGRAM(S): 2.5 TABLET, FILM COATED ORAL at 17:50

## 2024-03-24 RX ADMIN — ISOSORBIDE MONONITRATE 30 MILLIGRAM(S): 60 TABLET, EXTENDED RELEASE ORAL at 12:55

## 2024-03-24 RX ADMIN — Medication 50 MILLIGRAM(S): at 06:09

## 2024-03-24 RX ADMIN — APIXABAN 2.5 MILLIGRAM(S): 2.5 TABLET, FILM COATED ORAL at 06:09

## 2024-03-24 RX ADMIN — MONTELUKAST 10 MILLIGRAM(S): 4 TABLET, CHEWABLE ORAL at 12:56

## 2024-03-24 RX ADMIN — Medication 650 MILLIGRAM(S): at 09:03

## 2024-03-24 RX ADMIN — Medication 650 MILLIGRAM(S): at 17:50

## 2024-03-24 RX ADMIN — SERTRALINE 25 MILLIGRAM(S): 25 TABLET, FILM COATED ORAL at 12:56

## 2024-03-24 RX ADMIN — Medication 1334 MILLIGRAM(S): at 06:09

## 2024-03-24 RX ADMIN — Medication 1 MILLIGRAM(S): at 12:56

## 2024-03-24 RX ADMIN — Medication 1334 MILLIGRAM(S): at 17:50

## 2024-03-24 RX ADMIN — SODIUM CHLORIDE 50 MILLILITER(S): 9 INJECTION INTRAMUSCULAR; INTRAVENOUS; SUBCUTANEOUS at 17:52

## 2024-03-24 RX ADMIN — AMLODIPINE BESYLATE 5 MILLIGRAM(S): 2.5 TABLET ORAL at 21:34

## 2024-03-24 NOTE — PROGRESS NOTE ADULT - SUBJECTIVE AND OBJECTIVE BOX
Nephrology progress note    THIS IS AN INCOMPLETE NOTE . FULL NOTE TO FOLLOW SHORTLY    Patient is seen and examined, events over the last 24 h noted .    Allergies:  Keflex (Swelling)  cephalosporins (Angioedema)    Hospital Medications:   MEDICATIONS  (STANDING):  amLODIPine   Tablet 5 milliGRAM(s) Oral at bedtime  apixaban 2.5 milliGRAM(s) Oral every 12 hours  atorvastatin 80 milliGRAM(s) Oral at bedtime  calcium acetate 1334 milliGRAM(s) Oral every 12 hours  folic acid 1 milliGRAM(s) Oral daily  isosorbide   mononitrate ER Tablet (IMDUR) 30 milliGRAM(s) Oral daily  metoprolol succinate ER 50 milliGRAM(s) Oral daily  montelukast 10 milliGRAM(s) Oral daily  sertraline 25 milliGRAM(s) Oral daily  simethicone 80 milliGRAM(s) Chew daily  sodium bicarbonate 650 milliGRAM(s) Oral every 12 hours  sodium chloride 0.9%. 1000 milliLiter(s) (50 mL/Hr) IV Continuous <Continuous>        VITALS:  T(F): 97.6 (03-24-24 @ 05:05), Max: 98.4 (03-23-24 @ 21:25)  HR: 60 (03-24-24 @ 05:05)  BP: 126/59 (03-24-24 @ 05:05)  RR: 18 (03-24-24 @ 05:05)  SpO2: --  Wt(kg): --    03-23 @ 07:01  -  03-24 @ 07:00  --------------------------------------------------------  IN: 1832 mL / OUT: 950 mL / NET: 882 mL      Height (cm): 160 (03-23 @ 09:42)  Weight (kg): 57.1 (03-23 @ 09:42)  BMI (kg/m2): 22.3 (03-23 @ 09:42)  BSA (m2): 1.59 (03-23 @ 09:42)    PHYSICAL EXAM:  Constitutional: NAD  HEENT: anicteric sclera, oropharynx clear, MMM  Neck: No JVD  Respiratory: CTAB, no wheezes, rales or rhonchi  Cardiovascular: S1, S2, RRR  Gastrointestinal: BS+, soft, NT/ND  Extremities: No cyanosis or clubbing. No peripheral edema  :  No carlos.   Skin: No rashes    LABS:  03-23    129<L>  |  94<L>  |  99<HH>  ----------------------------<  106<H>  5.0   |  20  |  5.0<HH>    Ca    7.9<L>      23 Mar 2024 16:00  Mg     2.1     03-23    TPro  5.4<L>  /  Alb  3.0<L>  /  TBili  <0.2  /  DBili      /  AST  28  /  ALT  23  /  AlkPhos  100  03-23                          9.1    6.65  )-----------( 315      ( 23 Mar 2024 05:58 )             27.7       Urine Studies:  Urinalysis Basic - ( 23 Mar 2024 16:00 )    Color:  / Appearance:  / SG:  / pH:   Gluc: 106 mg/dL / Ketone:   / Bili:  / Urobili:    Blood:  / Protein:  / Nitrite:    Leuk Esterase:  / RBC:  / WBC    Sq Epi:  / Non Sq Epi:  / Bacteria:       Sodium, Random Urine: 36.0 mmoL/L (03-23 @ 21:23)  Osmolality, Random Urine: 273 mos/kg (03-23 @ 21:23)  Potassium, Random Urine: 16 mmol/L (03-23 @ 21:23)  Sodium, Random Urine: 47.0 mmoL/L (03-22 @ 21:20)  Creatinine, Random Urine: 42 mg/dL (03-22 @ 21:20)  Protein/Creatinine Ratio Calculation: 1.7 Ratio (03-22 @ 21:20)  Osmolality, Random Urine: 301 mos/kg (03-22 @ 21:20)  Potassium, Random Urine: 13 mmol/L (03-22 @ 21:20)    RADIOLOGY & ADDITIONAL STUDIES:   Nephrology progress note    THIS IS AN INCOMPLETE NOTE . FULL NOTE TO FOLLOW SHORTLY    Patient is seen and examined, events over the last 24 h noted .    Allergies:  Keflex (Swelling)  cephalosporins (Angioedema)    Hospital Medications:   MEDICATIONS  (STANDING):  amLODIPine   Tablet 5 milliGRAM(s) Oral at bedtime  apixaban 2.5 milliGRAM(s) Oral every 12 hours  atorvastatin 80 milliGRAM(s) Oral at bedtime  calcium acetate 1334 milliGRAM(s) Oral every 12 hours  folic acid 1 milliGRAM(s) Oral daily  isosorbide   mononitrate ER Tablet (IMDUR) 30 milliGRAM(s) Oral daily  metoprolol succinate ER 50 milliGRAM(s) Oral daily  montelukast 10 milliGRAM(s) Oral daily  sertraline 25 milliGRAM(s) Oral daily  simethicone 80 milliGRAM(s) Chew daily  sodium bicarbonate 650 milliGRAM(s) Oral every 12 hours  sodium chloride 0.9%. 1000 milliLiter(s) (50 mL/Hr) IV Continuous <Continuous>        VITALS:  T(F): 97.6 (03-24-24 @ 05:05), Max: 98.4 (03-23-24 @ 21:25)  HR: 60 (03-24-24 @ 05:05)  BP: 126/59 (03-24-24 @ 05:05)  RR: 18 (03-24-24 @ 05:05)  SpO2: --  Wt(kg): --    03-23 @ 07:01  -  03-24 @ 07:00  --------------------------------------------------------  IN: 1832 mL / OUT: 950 mL / NET: 882 mL      Height (cm): 160 (03-23 @ 09:42)  Weight (kg): 57.1 (03-23 @ 09:42)  BMI (kg/m2): 22.3 (03-23 @ 09:42)  BSA (m2): 1.59 (03-23 @ 09:42)    PHYSICAL EXAM:  	Gen: NAD,  	Pulm: decrease BS B/L               abd: distended    LABS:  03-23    129<L>  |  94<L>  |  99<HH>  ----------------------------<  106<H>  5.0   |  20  |  5.0<HH>    Ca    7.9<L>      23 Mar 2024 16:00  Mg     2.1     03-23    TPro  5.4<L>  /  Alb  3.0<L>  /  TBili  <0.2  /  DBili      /  AST  28  /  ALT  23  /  AlkPhos  100  03-23                          9.1    6.65  )-----------( 315      ( 23 Mar 2024 05:58 )             27.7       Urine Studies:  Urinalysis Basic - ( 23 Mar 2024 16:00 )    Color:  / Appearance:  / SG:  / pH:   Gluc: 106 mg/dL / Ketone:   / Bili:  / Urobili:    Blood:  / Protein:  / Nitrite:    Leuk Esterase:  / RBC:  / WBC    Sq Epi:  / Non Sq Epi:  / Bacteria:       Sodium, Random Urine: 36.0 mmoL/L (03-23 @ 21:23)  Osmolality, Random Urine: 273 mos/kg (03-23 @ 21:23)  Potassium, Random Urine: 16 mmol/L (03-23 @ 21:23)  Sodium, Random Urine: 47.0 mmoL/L (03-22 @ 21:20)  Creatinine, Random Urine: 42 mg/dL (03-22 @ 21:20)  Protein/Creatinine Ratio Calculation: 1.7 Ratio (03-22 @ 21:20)  Osmolality, Random Urine: 301 mos/kg (03-22 @ 21:20)  Potassium, Random Urine: 13 mmol/L (03-22 @ 21:20)    RADIOLOGY & ADDITIONAL STUDIES:   Nephrology progress note    THIS IS AN INCOMPLETE NOTE . FULL NOTE TO FOLLOW SHORTLY    Patient is seen and examined, events over the last 24 h noted .  Slight increase in sCr 4.7->5.0 but stable.  Nonoliguric currently.    Allergies:  Keflex (Swelling)  cephalosporins (Angioedema)    Hospital Medications:   MEDICATIONS  (STANDING):  amLODIPine   Tablet 5 milliGRAM(s) Oral at bedtime  apixaban 2.5 milliGRAM(s) Oral every 12 hours  atorvastatin 80 milliGRAM(s) Oral at bedtime  calcium acetate 1334 milliGRAM(s) Oral every 12 hours  folic acid 1 milliGRAM(s) Oral daily  isosorbide   mononitrate ER Tablet (IMDUR) 30 milliGRAM(s) Oral daily  metoprolol succinate ER 50 milliGRAM(s) Oral daily  montelukast 10 milliGRAM(s) Oral daily  sertraline 25 milliGRAM(s) Oral daily  simethicone 80 milliGRAM(s) Chew daily  sodium bicarbonate 650 milliGRAM(s) Oral every 12 hours  sodium chloride 0.9%. 1000 milliLiter(s) (50 mL/Hr) IV Continuous <Continuous>        VITALS:  T(F): 97.6 (03-24-24 @ 05:05), Max: 98.4 (03-23-24 @ 21:25)  HR: 60 (03-24-24 @ 05:05)  BP: 126/59 (03-24-24 @ 05:05)  RR: 18 (03-24-24 @ 05:05)  SpO2: --  Wt(kg): --    03-23 @ 07:01  -  03-24 @ 07:00  --------------------------------------------------------  IN: 1832 mL / OUT: 950 mL / NET: 882 mL      Height (cm): 160 (03-23 @ 09:42)  Weight (kg): 57.1 (03-23 @ 09:42)  BMI (kg/m2): 22.3 (03-23 @ 09:42)  BSA (m2): 1.59 (03-23 @ 09:42)    PHYSICAL EXAM:  	Gen: NAD,  	Pulm: decrease BS B/L               abd: distended    LABS:  03-23    129<L>  |  94<L>  |  99<HH>  ----------------------------<  106<H>  5.0   |  20  |  5.0<HH>    Ca    7.9<L>      23 Mar 2024 16:00  Mg     2.1     03-23    TPro  5.4<L>  /  Alb  3.0<L>  /  TBili  <0.2  /  DBili      /  AST  28  /  ALT  23  /  AlkPhos  100  03-23                          9.1    6.65  )-----------( 315      ( 23 Mar 2024 05:58 )             27.7       Urine Studies:  Urinalysis Basic - ( 23 Mar 2024 16:00 )    Color:  / Appearance:  / SG:  / pH:   Gluc: 106 mg/dL / Ketone:   / Bili:  / Urobili:    Blood:  / Protein:  / Nitrite:    Leuk Esterase:  / RBC:  / WBC    Sq Epi:  / Non Sq Epi:  / Bacteria:       Sodium, Random Urine: 36.0 mmoL/L (03-23 @ 21:23)  Osmolality, Random Urine: 273 mos/kg (03-23 @ 21:23)  Potassium, Random Urine: 16 mmol/L (03-23 @ 21:23)  Sodium, Random Urine: 47.0 mmoL/L (03-22 @ 21:20)  Creatinine, Random Urine: 42 mg/dL (03-22 @ 21:20)  Protein/Creatinine Ratio Calculation: 1.7 Ratio (03-22 @ 21:20)  Osmolality, Random Urine: 301 mos/kg (03-22 @ 21:20)  Potassium, Random Urine: 13 mmol/L (03-22 @ 21:20)    RADIOLOGY & ADDITIONAL STUDIES:   Nephrology progress note    Patient is seen and examined, events over the last 24 h noted .  Slight increase in sCr 4.7->5.0 but stable.  Nonoliguric currently.    Allergies:  Keflex (Swelling)  cephalosporins (Angioedema)    Hospital Medications:   MEDICATIONS  (STANDING):  amLODIPine   Tablet 5 milliGRAM(s) Oral at bedtime  apixaban 2.5 milliGRAM(s) Oral every 12 hours  atorvastatin 80 milliGRAM(s) Oral at bedtime  calcium acetate 1334 milliGRAM(s) Oral every 12 hours  folic acid 1 milliGRAM(s) Oral daily  isosorbide   mononitrate ER Tablet (IMDUR) 30 milliGRAM(s) Oral daily  metoprolol succinate ER 50 milliGRAM(s) Oral daily  montelukast 10 milliGRAM(s) Oral daily  sertraline 25 milliGRAM(s) Oral daily  simethicone 80 milliGRAM(s) Chew daily  sodium bicarbonate 650 milliGRAM(s) Oral every 12 hours  sodium chloride 0.9%. 1000 milliLiter(s) (50 mL/Hr) IV Continuous <Continuous>        VITALS:  T(F): 97.6 (03-24-24 @ 05:05), Max: 98.4 (03-23-24 @ 21:25)  HR: 60 (03-24-24 @ 05:05)  BP: 126/59 (03-24-24 @ 05:05)  RR: 18 (03-24-24 @ 05:05)  SpO2: --  Wt(kg): --    03-23 @ 07:01  -  03-24 @ 07:00  --------------------------------------------------------  IN: 1832 mL / OUT: 950 mL / NET: 882 mL      Height (cm): 160 (03-23 @ 09:42)  Weight (kg): 57.1 (03-23 @ 09:42)  BMI (kg/m2): 22.3 (03-23 @ 09:42)  BSA (m2): 1.59 (03-23 @ 09:42)    PHYSICAL EXAM:  	Gen: NAD,  	Pulm: decrease BS B/L               abd: distended    LABS:  03-23    129<L>  |  94<L>  |  99<HH>  ----------------------------<  106<H>  5.0   |  20  |  5.0<HH>    Ca    7.9<L>      23 Mar 2024 16:00  Mg     2.1     03-23    TPro  5.4<L>  /  Alb  3.0<L>  /  TBili  <0.2  /  DBili      /  AST  28  /  ALT  23  /  AlkPhos  100  03-23                          9.1    6.65  )-----------( 315      ( 23 Mar 2024 05:58 )             27.7       Urine Studies:  Urinalysis Basic - ( 23 Mar 2024 16:00 )    Color:  / Appearance:  / SG:  / pH:   Gluc: 106 mg/dL / Ketone:   / Bili:  / Urobili:    Blood:  / Protein:  / Nitrite:    Leuk Esterase:  / RBC:  / WBC    Sq Epi:  / Non Sq Epi:  / Bacteria:       Sodium, Random Urine: 36.0 mmoL/L (03-23 @ 21:23)  Osmolality, Random Urine: 273 mos/kg (03-23 @ 21:23)  Potassium, Random Urine: 16 mmol/L (03-23 @ 21:23)  Sodium, Random Urine: 47.0 mmoL/L (03-22 @ 21:20)  Creatinine, Random Urine: 42 mg/dL (03-22 @ 21:20)  Protein/Creatinine Ratio Calculation: 1.7 Ratio (03-22 @ 21:20)  Osmolality, Random Urine: 301 mos/kg (03-22 @ 21:20)  Potassium, Random Urine: 13 mmol/L (03-22 @ 21:20)    RADIOLOGY & ADDITIONAL STUDIES:

## 2024-03-24 NOTE — PROGRESS NOTE ADULT - ASSESSMENT
89 yo woman with h/o htn, DVT/PE in 2018 and in 2019, HFrEF s/p aicd and ckd V who p/w AMS and abdominal pain after being discharged from osh ([facility]) on 3/11 to complete macrobid abx course and now p/w ams and confusion with OMERO on CKD5    a/p:  #Metabolic encephalopathy  #OMERO on ckd stage V--2/2 dehydration and poor oral intake  #Resolved Cystitis  #Diarrhea---Enteritis  #HFrEF--s/p Aicd  #h/o DVT/PE  -tele monitoring  -continue to monitor bmp closely--bun/cr 107/4.7  -nephrology following---no acute indication for RRT at this time   -hold nephrotoxic meds  -closely monitor vs-----can hold additional bp meds if becomes hypotensive (both entresto and lasix on hold---restart as cr and bp tolerates)  -continue ivf with hc03  -f/u bcx and ucx- will hold restarting abx   -check stool study  -eps for device interrogation  -continue eliquis 2.5 mg bid  -f/u ua/ucr/ulytes  -primafit to monitor and document I/o    DVT/GI ppx  guarded prognosis    FULL CODE--continue to readdress [facility]    patient is jehovah witness- refuses blood transfusion and all blood products   87 yo woman with h/o htn, DVT/PE in 2018 and in 2019, HFrEF s/p aicd and ckd V who p/w AMS and abdominal pain after being discharged from osh ([facility]) on 3/11 to complete macrobid abx course and now p/w ams and confusion with OMERO on CKD5    a/p:  #Metabolic encephalopathy, improved  #OMERO on ckd stage V--2/2 dehydration and poor oral intake  #Resolved Cystitis  #Diarrhea---Enteritis  #HFrEF--s/p Aicd  #h/o DVT/PE  -tele monitoring  -continue to monitor bmp closely  -nephrology following---no acute indication for RRT at this time   -hold nephrotoxic meds  -closely monitor vs-----can hold additional bp meds if becomes hypotensive (both entresto and lasix on hold---restart as cr and bp tolerates)  -continue ivf with hc03   -f/u bcx and ucx- will hold restarting abx   -check stool study  -eps for device interrogation  -continue eliquis 2.5 mg bid  -f/u ua/ucr/ulytes  -primafit to monitor and document I/o    DVT/GI ppx  guarded prognosis    FULL CODE--continue to readdress [facility]    patient is jehovah witness- refuses blood transfusion and all blood products

## 2024-03-24 NOTE — PROGRESS NOTE ADULT - ASSESSMENT
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 Currently on Macrobid for UTI (pt was discharge from [facility] 3/11 papers in her chart) presents to the ED for fluctuating mental status and abdominal pain with occasional CP.  Nephrology was consulted for OMERO over CKD stage 5.    # OMERO on CKD stage 5  Likely pre-renal etiology from poor oral intake due to AMS and loss of appetite   Cr noted, slowly trending up  Nonoliguric but in positive fluid balance  Not hyperkalemic, clinically euvolemic  - d/c amlodipine if bp remains on the low side  - no diuretics  - please document UO  - continue sodium bicarbonate 650 q 12   - continue NS @ 50 cc/hr  - phos at goal no need for binders  - corrected calcium 8.6/ check vit D / PTH levels  - no acute indication for RRT     # hyponatremia  hypovolemic  sodium level trending up  u osm > 100/ U sodium high  - check tsh  - continue NS    will follow

## 2024-03-24 NOTE — PROGRESS NOTE ADULT - SUBJECTIVE AND OBJECTIVE BOX
RACHEL SUMMERS  88y  Female      Patient is a 88y old  Female who presents with a chief complaint of AMS (24 Mar 2024 09:28)      INTERVAL HPI/OVERNIGHT EVENTS:      Vital Signs Last 24 Hrs  T(C): 36.4 (24 Mar 2024 05:05), Max: 36.9 (23 Mar 2024 21:25)  T(F): 97.6 (24 Mar 2024 05:05), Max: 98.4 (23 Mar 2024 21:25)  HR: 60 (24 Mar 2024 05:05) (59 - 67)  BP: 126/59 (24 Mar 2024 05:05) (124/59 - 129/73)  RR: 18 (24 Mar 2024 05:05) (18 - 18)        03-23-24 @ 07:01  -  03-24-24 @ 07:00  --------------------------------------------------------  IN: 1832 mL / OUT: 950 mL / NET: 882 mL      PHYSICAL EXAM:    Consultant(s) Notes Reviewed:  [x ] YES  [ ] NO      LABS                          9.1    6.65  )-----------( 315      ( 23 Mar 2024 05:58 )             27.7     03-23    129<L>  |  94<L>  |  99<HH>  ----------------------------<  106<H>  5.0   |  20  |  5.0<HH>    Ca    7.9<L>      23 Mar 2024 16:00  Mg     2.1     03-23    TPro  5.4<L>  /  Alb  3.0<L>  /  TBili  <0.2  /  DBili  x   /  AST  28  /  ALT  23  /  AlkPhos  100  03-23                 RACHEL SUMMERS  88y  Female      Patient is a 88y old  Female who presents with a chief complaint of AMS (24 Mar 2024 09:28)      INTERVAL HPI/OVERNIGHT EVENTS: Pt very soft spoken but alert otherwise. makes minimal conversation.       Vital Signs Last 24 Hrs  T(C): 36.4 (24 Mar 2024 05:05), Max: 36.9 (23 Mar 2024 21:25)  T(F): 97.6 (24 Mar 2024 05:05), Max: 98.4 (23 Mar 2024 21:25)  HR: 60 (24 Mar 2024 05:05) (59 - 67)  BP: 126/59 (24 Mar 2024 05:05) (124/59 - 129/73)  RR: 18 (24 Mar 2024 05:05) (18 - 18)        03-23-24 @ 07:01  -  03-24-24 @ 07:00  --------------------------------------------------------  IN: 1832 mL / OUT: 950 mL / NET: 882 mL      PHYSICAL EXAM:  GEN: No acute distress  LUNGS: Clear to auscultation bilaterally   HEART: S1/S2 present. RRR.   ABD/ GI: Soft, non-tender, non-distended. Bowel sounds present  EXT: No edema  NEURO: AAOX2 at least    Consultant(s) Notes Reviewed:  [x ] YES  [ ] NO      LABS                          9.1    6.65  )-----------( 315      ( 23 Mar 2024 05:58 )             27.7     03-23    129<L>  |  94<L>  |  99<HH>  ----------------------------<  106<H>  5.0   |  20  |  5.0<HH>    Ca    7.9<L>      23 Mar 2024 16:00  Mg     2.1     03-23    TPro  5.4<L>  /  Alb  3.0<L>  /  TBili  <0.2  /  DBili  x   /  AST  28  /  ALT  23  /  AlkPhos  100  03-23

## 2024-03-25 LAB
-  AMOXICILLIN/CLAVULANIC ACID: SIGNIFICANT CHANGE UP
-  AMPICILLIN/SULBACTAM: SIGNIFICANT CHANGE UP
-  AMPICILLIN: SIGNIFICANT CHANGE UP
-  AZTREONAM: SIGNIFICANT CHANGE UP
-  CEFAZOLIN: SIGNIFICANT CHANGE UP
-  CEFEPIME: SIGNIFICANT CHANGE UP
-  CEFOXITIN: SIGNIFICANT CHANGE UP
-  CEFTRIAXONE: SIGNIFICANT CHANGE UP
-  CEFUROXIME: SIGNIFICANT CHANGE UP
-  CIPROFLOXACIN: SIGNIFICANT CHANGE UP
-  ERTAPENEM: SIGNIFICANT CHANGE UP
-  GENTAMICIN: SIGNIFICANT CHANGE UP
-  IMIPENEM: SIGNIFICANT CHANGE UP
-  LEVOFLOXACIN: SIGNIFICANT CHANGE UP
-  MEROPENEM: SIGNIFICANT CHANGE UP
-  NITROFURANTOIN: SIGNIFICANT CHANGE UP
-  PIPERACILLIN/TAZOBACTAM: SIGNIFICANT CHANGE UP
-  TOBRAMYCIN: SIGNIFICANT CHANGE UP
-  TRIMETHOPRIM/SULFAMETHOXAZOLE: SIGNIFICANT CHANGE UP
24R-OH-CALCIDIOL SERPL-MCNC: 9 NG/ML — LOW (ref 30–80)
ANION GAP SERPL CALC-SCNC: 12 MMOL/L — SIGNIFICANT CHANGE UP (ref 7–14)
ANION GAP SERPL CALC-SCNC: 13 MMOL/L — SIGNIFICANT CHANGE UP (ref 7–14)
ANION GAP SERPL CALC-SCNC: 13 MMOL/L — SIGNIFICANT CHANGE UP (ref 7–14)
BASOPHILS # BLD AUTO: 0.02 K/UL — SIGNIFICANT CHANGE UP (ref 0–0.2)
BASOPHILS NFR BLD AUTO: 0.3 % — SIGNIFICANT CHANGE UP (ref 0–1)
BUN SERPL-MCNC: 90 MG/DL — CRITICAL HIGH (ref 10–20)
BUN SERPL-MCNC: 96 MG/DL — CRITICAL HIGH (ref 10–20)
BUN SERPL-MCNC: 99 MG/DL — CRITICAL HIGH (ref 10–20)
CALCIUM SERPL-MCNC: 7.7 MG/DL — LOW (ref 8.4–10.4)
CALCIUM SERPL-MCNC: 7.8 MG/DL — LOW (ref 8.4–10.5)
CALCIUM SERPL-MCNC: 8.2 MG/DL — LOW (ref 8.4–10.5)
CALCIUM SERPL-MCNC: 8.2 MG/DL — LOW (ref 8.4–10.5)
CHLORIDE SERPL-SCNC: 95 MMOL/L — LOW (ref 98–110)
CHLORIDE SERPL-SCNC: 98 MMOL/L — SIGNIFICANT CHANGE UP (ref 98–110)
CHLORIDE SERPL-SCNC: 99 MMOL/L — SIGNIFICANT CHANGE UP (ref 98–110)
CO2 SERPL-SCNC: 20 MMOL/L — SIGNIFICANT CHANGE UP (ref 17–32)
CO2 SERPL-SCNC: 22 MMOL/L — SIGNIFICANT CHANGE UP (ref 17–32)
CO2 SERPL-SCNC: 23 MMOL/L — SIGNIFICANT CHANGE UP (ref 17–32)
CREAT SERPL-MCNC: 5.4 MG/DL — CRITICAL HIGH (ref 0.7–1.5)
CREAT SERPL-MCNC: 5.5 MG/DL — CRITICAL HIGH (ref 0.7–1.5)
CREAT SERPL-MCNC: 5.6 MG/DL — CRITICAL HIGH (ref 0.7–1.5)
CULTURE RESULTS: ABNORMAL
EGFR: 7 ML/MIN/1.73M2 — LOW
EOSINOPHIL # BLD AUTO: 0.26 K/UL — SIGNIFICANT CHANGE UP (ref 0–0.7)
EOSINOPHIL NFR BLD AUTO: 3.5 % — SIGNIFICANT CHANGE UP (ref 0–8)
GLUCOSE SERPL-MCNC: 172 MG/DL — HIGH (ref 70–99)
GLUCOSE SERPL-MCNC: 80 MG/DL — SIGNIFICANT CHANGE UP (ref 70–99)
GLUCOSE SERPL-MCNC: 98 MG/DL — SIGNIFICANT CHANGE UP (ref 70–99)
HCT VFR BLD CALC: 27.7 % — LOW (ref 37–47)
HGB BLD-MCNC: 9.1 G/DL — LOW (ref 12–16)
IMM GRANULOCYTES NFR BLD AUTO: 1 % — HIGH (ref 0.1–0.3)
LYMPHOCYTES # BLD AUTO: 1.48 K/UL — SIGNIFICANT CHANGE UP (ref 1.2–3.4)
LYMPHOCYTES # BLD AUTO: 20.2 % — LOW (ref 20.5–51.1)
MCHC RBC-ENTMCNC: 28.6 PG — SIGNIFICANT CHANGE UP (ref 27–31)
MCHC RBC-ENTMCNC: 32.9 G/DL — SIGNIFICANT CHANGE UP (ref 32–37)
MCV RBC AUTO: 87.1 FL — SIGNIFICANT CHANGE UP (ref 81–99)
METHOD TYPE: SIGNIFICANT CHANGE UP
MONOCYTES # BLD AUTO: 1.07 K/UL — HIGH (ref 0.1–0.6)
MONOCYTES NFR BLD AUTO: 14.6 % — HIGH (ref 1.7–9.3)
NEUTROPHILS # BLD AUTO: 4.43 K/UL — SIGNIFICANT CHANGE UP (ref 1.4–6.5)
NEUTROPHILS NFR BLD AUTO: 60.4 % — SIGNIFICANT CHANGE UP (ref 42.2–75.2)
NRBC # BLD: 0 /100 WBCS — SIGNIFICANT CHANGE UP (ref 0–0)
ORGANISM # SPEC MICROSCOPIC CNT: ABNORMAL
ORGANISM # SPEC MICROSCOPIC CNT: SIGNIFICANT CHANGE UP
PHOSPHATE SERPL-MCNC: 4 MG/DL — SIGNIFICANT CHANGE UP (ref 2.1–4.9)
PLATELET # BLD AUTO: 291 K/UL — SIGNIFICANT CHANGE UP (ref 130–400)
PMV BLD: 9 FL — SIGNIFICANT CHANGE UP (ref 7.4–10.4)
POTASSIUM SERPL-MCNC: 5 MMOL/L — SIGNIFICANT CHANGE UP (ref 3.5–5)
POTASSIUM SERPL-MCNC: 5.2 MMOL/L — HIGH (ref 3.5–5)
POTASSIUM SERPL-MCNC: 5.7 MMOL/L — HIGH (ref 3.5–5)
POTASSIUM SERPL-SCNC: 5 MMOL/L — SIGNIFICANT CHANGE UP (ref 3.5–5)
POTASSIUM SERPL-SCNC: 5.2 MMOL/L — HIGH (ref 3.5–5)
POTASSIUM SERPL-SCNC: 5.7 MMOL/L — HIGH (ref 3.5–5)
PTH-INTACT FLD-MCNC: 138 PG/ML — HIGH (ref 15–65)
RBC # BLD: 3.18 M/UL — LOW (ref 4.2–5.4)
RBC # FLD: 13.6 % — SIGNIFICANT CHANGE UP (ref 11.5–14.5)
SODIUM SERPL-SCNC: 131 MMOL/L — LOW (ref 135–146)
SODIUM SERPL-SCNC: 132 MMOL/L — LOW (ref 135–146)
SODIUM SERPL-SCNC: 132 MMOL/L — LOW (ref 135–146)
SPECIMEN SOURCE: SIGNIFICANT CHANGE UP
WBC # BLD: 7.33 K/UL — SIGNIFICANT CHANGE UP (ref 4.8–10.8)
WBC # FLD AUTO: 7.33 K/UL — SIGNIFICANT CHANGE UP (ref 4.8–10.8)

## 2024-03-25 PROCEDURE — 99233 SBSQ HOSP IP/OBS HIGH 50: CPT

## 2024-03-25 RX ORDER — SODIUM ZIRCONIUM CYCLOSILICATE 10 G/10G
10 POWDER, FOR SUSPENSION ORAL ONCE
Refills: 0 | Status: COMPLETED | OUTPATIENT
Start: 2024-03-25 | End: 2024-03-25

## 2024-03-25 RX ORDER — MIRTAZAPINE 45 MG/1
7.5 TABLET, ORALLY DISINTEGRATING ORAL DAILY
Refills: 0 | Status: DISCONTINUED | OUTPATIENT
Start: 2024-03-25 | End: 2024-04-03

## 2024-03-25 RX ADMIN — ATORVASTATIN CALCIUM 80 MILLIGRAM(S): 80 TABLET, FILM COATED ORAL at 21:33

## 2024-03-25 RX ADMIN — Medication 1334 MILLIGRAM(S): at 17:32

## 2024-03-25 RX ADMIN — Medication 1 MILLIGRAM(S): at 11:20

## 2024-03-25 RX ADMIN — ISOSORBIDE MONONITRATE 30 MILLIGRAM(S): 60 TABLET, EXTENDED RELEASE ORAL at 11:20

## 2024-03-25 RX ADMIN — SERTRALINE 25 MILLIGRAM(S): 25 TABLET, FILM COATED ORAL at 11:20

## 2024-03-25 RX ADMIN — SIMETHICONE 80 MILLIGRAM(S): 80 TABLET, CHEWABLE ORAL at 11:20

## 2024-03-25 RX ADMIN — Medication 1334 MILLIGRAM(S): at 05:30

## 2024-03-25 RX ADMIN — Medication 650 MILLIGRAM(S): at 17:32

## 2024-03-25 RX ADMIN — SODIUM ZIRCONIUM CYCLOSILICATE 10 GRAM(S): 10 POWDER, FOR SUSPENSION ORAL at 20:36

## 2024-03-25 RX ADMIN — Medication 650 MILLIGRAM(S): at 05:30

## 2024-03-25 RX ADMIN — MONTELUKAST 10 MILLIGRAM(S): 4 TABLET, CHEWABLE ORAL at 11:20

## 2024-03-25 RX ADMIN — SODIUM ZIRCONIUM CYCLOSILICATE 10 GRAM(S): 10 POWDER, FOR SUSPENSION ORAL at 11:21

## 2024-03-25 RX ADMIN — APIXABAN 2.5 MILLIGRAM(S): 2.5 TABLET, FILM COATED ORAL at 17:32

## 2024-03-25 RX ADMIN — AMLODIPINE BESYLATE 5 MILLIGRAM(S): 2.5 TABLET ORAL at 21:33

## 2024-03-25 RX ADMIN — Medication 50 MILLIGRAM(S): at 05:30

## 2024-03-25 RX ADMIN — APIXABAN 2.5 MILLIGRAM(S): 2.5 TABLET, FILM COATED ORAL at 05:32

## 2024-03-25 NOTE — PROGRESS NOTE ADULT - SUBJECTIVE AND OBJECTIVE BOX
24H events:    Patient is a 88y old Female who presents with a chief complaint of AMS (25 Mar 2024 09:15)    Primary diagnosis of OMERO (acute kidney injury)    Today is hospital day 3d. This morning patient was seen and examined at bedside, resting comfortably in bed.    no tele. Hemodynamically stable, tolerating oral diet, voiding appropriately with appropriate bowel movements.     Code Status: DNR/DNI       PAST MEDICAL & SURGICAL HISTORY  Chronic kidney disease    Hypertension    Gout    Diverticulitis  sigmoid    Rheumatoid arthritis    DVT, lower extremity  Bilateral    Pulmonary embolism    Chronic HFrEF (heart failure with reduced ejection fraction)    AICD (automatic cardioverter/defibrillator) present    Artificial pacemaker    History of appendectomy    History of tonsillectomy    History of hysterectomy    H/O hernia repair      SOCIAL HISTORY:  Social History:      ALLERGIES:  Keflex (Swelling)  cephalosporins (Angioedema)    MEDICATIONS:  STANDING MEDICATIONS  amLODIPine   Tablet 5 milliGRAM(s) Oral at bedtime  apixaban 2.5 milliGRAM(s) Oral every 12 hours  atorvastatin 80 milliGRAM(s) Oral at bedtime  calcium acetate 1334 milliGRAM(s) Oral every 12 hours  folic acid 1 milliGRAM(s) Oral daily  isosorbide   mononitrate ER Tablet (IMDUR) 30 milliGRAM(s) Oral daily  metoprolol succinate ER 50 milliGRAM(s) Oral daily  montelukast 10 milliGRAM(s) Oral daily  sertraline 25 milliGRAM(s) Oral daily  simethicone 80 milliGRAM(s) Chew daily  sodium bicarbonate 650 milliGRAM(s) Oral every 12 hours  sodium chloride 0.9%. 1000 milliLiter(s) IV Continuous <Continuous>    PRN MEDICATIONS  albuterol    90 MICROgram(s) HFA Inhaler 2 Puff(s) Inhalation every 6 hours PRN    VITALS:   T(F): 97.7  HR: 64  BP: 143/67  RR: 18  SpO2: --    PHYSICAL EXAM:  GENERAL: NAD,   HEAD: NCAD,   NECK: Supple,   HEART: Regular rate and rhythm, normal S1/S2,   LUNGS: No acute respiratory distress, clear b/l breath sounds,   ABDOMEN:  soft, non-tender, non-distented, + BS,    EXTREMITIES: no rashes, extremities warm/dry,   NERVOUS SYSTEM:  A&Ox3, CNII-XII intact, answers questions appropriately            LABS:                        9.1    7.33  )-----------( 291      ( 25 Mar 2024 06:36 )             27.7     03-25    132<L>  |  98  |  96<HH>  ----------------------------<  80  5.2<H>   |  22  |  5.5<HH>    Ca    7.8<L>      25 Mar 2024 06:36  Phos  4.0     03-25        Urinalysis Basic - ( 25 Mar 2024 06:36 )    Color: x / Appearance: x / SG: x / pH: x  Gluc: 80 mg/dL / Ketone: x  / Bili: x / Urobili: x   Blood: x / Protein: x / Nitrite: x   Leuk Esterase: x / RBC: x / WBC x   Sq Epi: x / Non Sq Epi: x / Bacteria: x            Culture - Urine (collected 22 Mar 2024 21:20)  Source: Clean Catch Clean Catch (Midstream)  Final Report (24 Mar 2024 07:05):    <10,000 CFU/mL Normal Urogenital Rand          RADIOLOGY:    RADIOLOGY

## 2024-03-25 NOTE — PROGRESS NOTE ADULT - ASSESSMENT
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 Currently on Macrobid for UTI (pt was discharge from [facility] 3/11 papers in her chart) presents to the ED for fluctuating mental status and abdominal pain with occasional CP.  Nephrology was consulted for OMERO over CKD stage 5.  # OMERO on CKD stage 5  Likely pre-renal etiology from poor oral intake due to AMS and loss of appetite   Cr noted, slowly trending up/ stable today   Nonoliguric    k noted / lokelma 10 q 24    no diuretics   continue sodium bicarbonate 650 q 12    continue NS @ 50 cc/hr/ sodium improving   phos at goal no need for binders    last corrected calcium 8.6/ check vit D / PTH levels   no acute indication for RRT   will follow

## 2024-03-25 NOTE — PROGRESS NOTE ADULT - ATTENDING COMMENTS
Left message notifying mom that we have not received vaccines and to see if she'd like to reschedule appointment. Will send mychart message.   ***My note supersedes any discrepancies that may be above in the resident's note***    87 yo woman with h/o htn, DVT/PE in 2018 and in 2019, HFrEF s/p aicd and ckd V who p/w AMS and abdominal pain after being discharged from osh ([facility]) on 3/11 to complete macrobid abx course and now p/w ams and confusion with OMERO on CKD5    #Metabolic encephalopathy, improved  #OMERO on ckd stage V--2/2 dehydration and poor oral intake  #Resolved Cystitis  #Diarrhea---Enteritis  #HFrEF--s/p Aicd  #h/o DVT/PE  - urine cx: 3/21 E.coli(pan sensitive); repeat 3/22 negative?  - tele monitoring  - continue to monitor bmp closely  - nephrology following---no acute indication for RRT at this time   - hold nephrotoxic meds  - closely monitor vs-----can hold additional bp meds if becomes hypotensive (both entresto and lasix on hold---restart as cr and bp tolerates)  - continue ivf 50cc/hr with hc03   - lokelma 10g q24h for elevated potassium  -f/u ua/ucr/ulytes -> wnl   -primafit to monitor and document I/o  - palliative care consulted for GOC    # HTN  amLODIPine   Tablet 5 milliGRAM(s) Oral at bedtime  isosorbide   mononitrate ER Tablet (IMDUR) 30 milliGRAM(s) Oral daily  metoprolol succinate ER 50 milliGRAM(s) Oral daily    # Hx of dvt/pe  - on apixaban 2.5 milliGRAM(s) Oral every 12 hours; however dose reduction not appropriate for goal directed therapy. possible off label dosing after discussing bleeding risk with family? patient unsure. please confirm when family comes in    # HLD  atorvastatin 80 milliGRAM(s) Oral at bedtime     # Depression   - starting mirtazapine 7.5 milliGRAM(s) Oral daily for mood and appetite stimulation  sertraline 25 milliGRAM(s) Oral daily       DVT/GI ppx  FULL CODE--continue to readdress goc  dispo: tele    #Progress Note Handoff  Pending (specify): serial BMP, GOC with pall care c/s(was previously discharged on hospice). tried to engage patient but she seemed overwhelmed and was not able to provide any guidance on what she wants to do or if she wants family to make on her behalf.   Family discussion: pending  Disposition:  Unknown at this time________

## 2024-03-25 NOTE — PROGRESS NOTE ADULT - SUBJECTIVE AND OBJECTIVE BOX
seen and examined  24 h events noted   no distress         PAST HISTORY  --------------------------------------------------------------------------------  No significant changes to PMH, PSH, FHx, SHx, unless otherwise noted    ALLERGIES & MEDICATIONS  --------------------------------------------------------------------------------  Allergies    Keflex (Swelling)  cephalosporins (Angioedema)    Intolerances      Standing Inpatient Medications  amLODIPine   Tablet 5 milliGRAM(s) Oral at bedtime  apixaban 2.5 milliGRAM(s) Oral every 12 hours  atorvastatin 80 milliGRAM(s) Oral at bedtime  calcium acetate 1334 milliGRAM(s) Oral every 12 hours  folic acid 1 milliGRAM(s) Oral daily  isosorbide   mononitrate ER Tablet (IMDUR) 30 milliGRAM(s) Oral daily  metoprolol succinate ER 50 milliGRAM(s) Oral daily  montelukast 10 milliGRAM(s) Oral daily  sertraline 25 milliGRAM(s) Oral daily  simethicone 80 milliGRAM(s) Chew daily  sodium bicarbonate 650 milliGRAM(s) Oral every 12 hours  sodium chloride 0.9%. 1000 milliLiter(s) IV Continuous <Continuous>  sodium zirconium cyclosilicate 10 Gram(s) Oral once    PRN Inpatient Medications  albuterol    90 MICROgram(s) HFA Inhaler 2 Puff(s) Inhalation every 6 hours PRN        VITALS/PHYSICAL EXAM  --------------------------------------------------------------------------------  T(C): 36.7 (03-25-24 @ 05:37), Max: 37.1 (03-24-24 @ 21:09)  HR: 66 (03-25-24 @ 05:37) (62 - 71)  BP: 145/68 (03-25-24 @ 05:37) (145/68 - 162/74)  RR: 17 (03-25-24 @ 05:37) (17 - 18)  SpO2: --  Wt(kg): --  Height (cm): 160 (03-23-24 @ 09:42)  Weight (kg): 57.1 (03-23-24 @ 09:42)  BMI (kg/m2): 22.3 (03-23-24 @ 09:42)  BSA (m2): 1.59 (03-23-24 @ 09:42)      03-24-24 @ 07:01  -  03-25-24 @ 07:00  --------------------------------------------------------  IN: 1490 mL / OUT: 1550 mL / NET: -60 mL      Physical Exam:  	Gen: NAD  	Pulm: decrease BS  B/L  	CV:  S1S2; no rub  	Abd: +distended      LABS/STUDIES  --------------------------------------------------------------------------------              9.1    7.33  >-----------<  291      [03-25-24 @ 06:36]              27.7     132  |  98  |  96  ----------------------------<  80      [03-25-24 @ 06:36]  5.2   |  22  |  5.5        Ca     7.8     [03-25-24 @ 06:36]      Phos  4.0     [03-25-24 @ 06:36]        Creatinine Trend:  SCr 5.5 [03-25 @ 06:36]  SCr 5.6 [03-24 @ 20:00]  SCr 5.0 [03-23 @ 16:00]  SCr 4.7 [03-23 @ 05:58]  SCr 5.0 [03-22 @ 20:44]    Urinalysis - [03-25-24 @ 06:36]      Color  / Appearance  / SG  / pH       Gluc 80 / Ketone   / Bili  / Urobili        Blood  / Protein  / Leuk Est  / Nitrite       RBC  / WBC  / Hyaline  / Gran  / Sq Epi  / Non Sq Epi  / Bacteria     Urine Creatinine 45      [03-23-24 @ 21:23]  Urine Protein 108      [03-23-24 @ 21:23]  Urine Sodium 36.0      [03-23-24 @ 21:23]  Urine Urea Nitrogen 405      [03-23-24 @ 21:23]  Urine Potassium 16      [03-23-24 @ 21:23]  Urine Osmolality 273      [03-23-24 @ 21:23]    Lipid: chol 144, , HDL 39, LDL --      [03-22-24 @ 06:14]

## 2024-03-25 NOTE — PROGRESS NOTE ADULT - ASSESSMENT
87 yo woman with h/o htn, DVT/PE in 2018 and in 2019, HFrEF s/p aicd and ckd V who p/w AMS and abdominal pain after being discharged from osh ([facility]) on 3/11 to complete macrobid abx course and now p/w ams and confusion with OMERO on CKD5    #Metabolic encephalopathy, improved  #OMERO on ckd stage V--2/2 dehydration and poor oral intake  #Resolved Cystitis  #Diarrhea---Enteritis  #HFrEF--s/p Aicd  #h/o DVT/PE  -tele monitoring  -continue to monitor bmp closely  -nephrology following---no acute indication for RRT at this time   -hold nephrotoxic meds  -closely monitor vs-----can hold additional bp meds if becomes hypotensive (both entresto and lasix on hold---restart as cr and bp tolerates)  -continue ivf 50cc/hr with hc03   - lokelma 10g q24h for elevated potassium   bcx and ucx- neg  -check stool study pending collection  -eps interrogated device   -continue eliquis 2.5 mg bid  -f/u ua/ucr/ulytes -> wnl   -primafit to monitor and document I/o  - palliative care consulted for GOC    DVT/GI ppx  FULL CODE--continue to readdress goc  dispo: tele  pend: GOC with palliative

## 2024-03-26 DIAGNOSIS — G93.41 METABOLIC ENCEPHALOPATHY: ICD-10-CM

## 2024-03-26 DIAGNOSIS — N39.0 URINARY TRACT INFECTION, SITE NOT SPECIFIED: ICD-10-CM

## 2024-03-26 DIAGNOSIS — Z51.5 ENCOUNTER FOR PALLIATIVE CARE: ICD-10-CM

## 2024-03-26 DIAGNOSIS — N17.9 ACUTE KIDNEY FAILURE, UNSPECIFIED: ICD-10-CM

## 2024-03-26 LAB
ANION GAP SERPL CALC-SCNC: 12 MMOL/L — SIGNIFICANT CHANGE UP (ref 7–14)
ANION GAP SERPL CALC-SCNC: 14 MMOL/L — SIGNIFICANT CHANGE UP (ref 7–14)
BASE EXCESS BLDA CALC-SCNC: -1.5 MMOL/L — SIGNIFICANT CHANGE UP (ref -2–3)
BUN SERPL-MCNC: 88 MG/DL — CRITICAL HIGH (ref 10–20)
BUN SERPL-MCNC: 89 MG/DL — CRITICAL HIGH (ref 10–20)
CALCIUM SERPL-MCNC: 8 MG/DL — LOW (ref 8.4–10.5)
CALCIUM SERPL-MCNC: 8.1 MG/DL — LOW (ref 8.4–10.4)
CHLORIDE SERPL-SCNC: 98 MMOL/L — SIGNIFICANT CHANGE UP (ref 98–110)
CHLORIDE SERPL-SCNC: 99 MMOL/L — SIGNIFICANT CHANGE UP (ref 98–110)
CO2 SERPL-SCNC: 20 MMOL/L — SIGNIFICANT CHANGE UP (ref 17–32)
CO2 SERPL-SCNC: 22 MMOL/L — SIGNIFICANT CHANGE UP (ref 17–32)
CREAT SERPL-MCNC: 5.7 MG/DL — CRITICAL HIGH (ref 0.7–1.5)
CREAT SERPL-MCNC: 5.8 MG/DL — CRITICAL HIGH (ref 0.7–1.5)
EGFR: 7 ML/MIN/1.73M2 — LOW
EGFR: 7 ML/MIN/1.73M2 — LOW
GAS PNL BLDA: SIGNIFICANT CHANGE UP
GLUCOSE SERPL-MCNC: 82 MG/DL — SIGNIFICANT CHANGE UP (ref 70–99)
GLUCOSE SERPL-MCNC: 83 MG/DL — SIGNIFICANT CHANGE UP (ref 70–99)
HCO3 BLDA-SCNC: 21 MMOL/L — SIGNIFICANT CHANGE UP (ref 21–28)
HCT VFR BLD CALC: 29 % — LOW (ref 37–47)
HGB BLD-MCNC: 9.3 G/DL — LOW (ref 12–16)
HOROWITZ INDEX BLDA+IHG-RTO: 28 — SIGNIFICANT CHANGE UP
MAGNESIUM SERPL-MCNC: 1.9 MG/DL — SIGNIFICANT CHANGE UP (ref 1.8–2.4)
MCHC RBC-ENTMCNC: 28.4 PG — SIGNIFICANT CHANGE UP (ref 27–31)
MCHC RBC-ENTMCNC: 32.1 G/DL — SIGNIFICANT CHANGE UP (ref 32–37)
MCV RBC AUTO: 88.4 FL — SIGNIFICANT CHANGE UP (ref 81–99)
NRBC # BLD: 0 /100 WBCS — SIGNIFICANT CHANGE UP (ref 0–0)
PCO2 BLDA: 29 MMHG — LOW (ref 32–45)
PH BLDA: 7.47 — HIGH (ref 7.35–7.45)
PHOSPHATE SERPL-MCNC: 3.9 MG/DL — SIGNIFICANT CHANGE UP (ref 2.1–4.9)
PLATELET # BLD AUTO: 253 K/UL — SIGNIFICANT CHANGE UP (ref 130–400)
PMV BLD: 8.9 FL — SIGNIFICANT CHANGE UP (ref 7.4–10.4)
PO2 BLDA: 70 MMHG — LOW (ref 83–108)
POTASSIUM SERPL-MCNC: 4.7 MMOL/L — SIGNIFICANT CHANGE UP (ref 3.5–5)
POTASSIUM SERPL-MCNC: 4.7 MMOL/L — SIGNIFICANT CHANGE UP (ref 3.5–5)
POTASSIUM SERPL-SCNC: 4.7 MMOL/L — SIGNIFICANT CHANGE UP (ref 3.5–5)
POTASSIUM SERPL-SCNC: 4.7 MMOL/L — SIGNIFICANT CHANGE UP (ref 3.5–5)
RBC # BLD: 3.28 M/UL — LOW (ref 4.2–5.4)
RBC # FLD: 13.5 % — SIGNIFICANT CHANGE UP (ref 11.5–14.5)
SAO2 % BLDA: 96.3 % — SIGNIFICANT CHANGE UP (ref 94–98)
SODIUM SERPL-SCNC: 132 MMOL/L — LOW (ref 135–146)
SODIUM SERPL-SCNC: 133 MMOL/L — LOW (ref 135–146)
VIT D25+D1,25 OH+D1,25 PNL SERPL-MCNC: 13.1 PG/ML — LOW (ref 19.9–79.3)
WBC # BLD: 6.71 K/UL — SIGNIFICANT CHANGE UP (ref 4.8–10.8)
WBC # FLD AUTO: 6.71 K/UL — SIGNIFICANT CHANGE UP (ref 4.8–10.8)

## 2024-03-26 PROCEDURE — 99223 1ST HOSP IP/OBS HIGH 75: CPT

## 2024-03-26 PROCEDURE — 71045 X-RAY EXAM CHEST 1 VIEW: CPT | Mod: 26

## 2024-03-26 PROCEDURE — 99233 SBSQ HOSP IP/OBS HIGH 50: CPT

## 2024-03-26 RX ORDER — ACETAMINOPHEN 500 MG
650 TABLET ORAL EVERY 6 HOURS
Refills: 0 | Status: DISCONTINUED | OUTPATIENT
Start: 2024-03-26 | End: 2024-04-06

## 2024-03-26 RX ORDER — ACETAMINOPHEN 500 MG
650 TABLET ORAL ONCE
Refills: 0 | Status: COMPLETED | OUTPATIENT
Start: 2024-03-26 | End: 2024-03-26

## 2024-03-26 RX ADMIN — SIMETHICONE 80 MILLIGRAM(S): 80 TABLET, CHEWABLE ORAL at 12:08

## 2024-03-26 RX ADMIN — Medication 1334 MILLIGRAM(S): at 05:38

## 2024-03-26 RX ADMIN — Medication 650 MILLIGRAM(S): at 05:38

## 2024-03-26 RX ADMIN — MIRTAZAPINE 7.5 MILLIGRAM(S): 45 TABLET, ORALLY DISINTEGRATING ORAL at 12:15

## 2024-03-26 RX ADMIN — Medication 50 MILLIGRAM(S): at 05:40

## 2024-03-26 RX ADMIN — MONTELUKAST 10 MILLIGRAM(S): 4 TABLET, CHEWABLE ORAL at 12:07

## 2024-03-26 RX ADMIN — AMLODIPINE BESYLATE 5 MILLIGRAM(S): 2.5 TABLET ORAL at 21:46

## 2024-03-26 RX ADMIN — Medication 650 MILLIGRAM(S): at 21:46

## 2024-03-26 RX ADMIN — Medication 1 MILLIGRAM(S): at 12:07

## 2024-03-26 RX ADMIN — APIXABAN 2.5 MILLIGRAM(S): 2.5 TABLET, FILM COATED ORAL at 05:38

## 2024-03-26 RX ADMIN — SERTRALINE 25 MILLIGRAM(S): 25 TABLET, FILM COATED ORAL at 12:07

## 2024-03-26 RX ADMIN — Medication 650 MILLIGRAM(S): at 22:00

## 2024-03-26 RX ADMIN — ATORVASTATIN CALCIUM 80 MILLIGRAM(S): 80 TABLET, FILM COATED ORAL at 21:46

## 2024-03-26 RX ADMIN — APIXABAN 2.5 MILLIGRAM(S): 2.5 TABLET, FILM COATED ORAL at 17:54

## 2024-03-26 RX ADMIN — ISOSORBIDE MONONITRATE 30 MILLIGRAM(S): 60 TABLET, EXTENDED RELEASE ORAL at 12:07

## 2024-03-26 RX ADMIN — Medication 1334 MILLIGRAM(S): at 17:54

## 2024-03-26 RX ADMIN — Medication 650 MILLIGRAM(S): at 17:54

## 2024-03-26 NOTE — PROGRESS NOTE ADULT - ASSESSMENT
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 Currently on Macrobid for UTI (pt was discharge from [facility] 3/11 papers in her chart) presents to the ED for fluctuating mental status and abdominal pain with occasional CP.  Nephrology was consulted for OMERO over CKD stage 5.  # OMERO on CKD stage 5  Likely pre-renal etiology from poor oral intake due to AMS and loss of appetite   Cr noted stable   Nonoliguric    no diuretics   continue sodium bicarbonate 650 q 12   sodium stable   phos at goal no need for binders   start vit D    no acute indication for RRT  ? GOC    will follow

## 2024-03-26 NOTE — PROGRESS NOTE ADULT - SUBJECTIVE AND OBJECTIVE BOX
seen and examined   24 h events noted   no distress       PAST HISTORY  --------------------------------------------------------------------------------  No significant changes to PMH, PSH, FHx, SHx, unless otherwise noted    ALLERGIES & MEDICATIONS  --------------------------------------------------------------------------------  Allergies    Keflex (Swelling)  cephalosporins (Angioedema)    Intolerances      Standing Inpatient Medications  amLODIPine   Tablet 5 milliGRAM(s) Oral at bedtime  apixaban 2.5 milliGRAM(s) Oral every 12 hours  atorvastatin 80 milliGRAM(s) Oral at bedtime  calcium acetate 1334 milliGRAM(s) Oral every 12 hours  folic acid 1 milliGRAM(s) Oral daily  isosorbide   mononitrate ER Tablet (IMDUR) 30 milliGRAM(s) Oral daily  metoprolol succinate ER 50 milliGRAM(s) Oral daily  mirtazapine 7.5 milliGRAM(s) Oral daily  montelukast 10 milliGRAM(s) Oral daily  sertraline 25 milliGRAM(s) Oral daily  simethicone 80 milliGRAM(s) Chew daily  sodium bicarbonate 650 milliGRAM(s) Oral every 12 hours  sodium chloride 0.9%. 1000 milliLiter(s) IV Continuous <Continuous>    PRN Inpatient Medications  albuterol    90 MICROgram(s) HFA Inhaler 2 Puff(s) Inhalation every 6 hours PRN        VITALS/PHYSICAL EXAM  --------------------------------------------------------------------------------  T(C): 36.6 (03-26-24 @ 05:43), Max: 36.6 (03-26-24 @ 05:43)  HR: 63 (03-26-24 @ 05:43) (63 - 66)  BP: 120/58 (03-26-24 @ 05:43) (120/58 - 143/67)  RR: 18 (03-26-24 @ 05:43) (18 - 18)  SpO2: --  Wt(kg): --        03-25-24 @ 07:01  -  03-26-24 @ 07:00  --------------------------------------------------------  IN: 519 mL / OUT: 800 mL / NET: -281 mL      Physical Exam:  	Gen: NAD  	Pulm: decrease BS  B/L  	CV:  S1S2; no rub  	Abd: distended  	    LABS/STUDIES  --------------------------------------------------------------------------------              9.3    6.71  >-----------<  253      [03-26-24 @ 06:47]              29.0     133  |  99  |  88  ----------------------------<  83      [03-26-24 @ 06:47]  4.7   |  20  |  5.8        Ca     8.1     [03-26-24 @ 06:47]      Mg     1.9     [03-26-24 @ 06:47]      Phos  3.9     [03-26-24 @ 06:47]        Creatinine Trend:  SCr 5.8 [03-26 @ 06:47]  SCr 5.7 [03-26 @ 01:16]  SCr 5.4 [03-25 @ 18:10]  SCr 5.5 [03-25 @ 06:36]  SCr 5.6 [03-24 @ 20:00]    Urinalysis - [03-26-24 @ 06:47]      Color  / Appearance  / SG  / pH       Gluc 83 / Ketone   / Bili  / Urobili        Blood  / Protein  / Leuk Est  / Nitrite       RBC  / WBC  / Hyaline  / Gran  / Sq Epi  / Non Sq Epi  / Bacteria     Urine Creatinine 45      [03-23-24 @ 21:23]  Urine Protein 108      [03-23-24 @ 21:23]  Urine Sodium 36.0      [03-23-24 @ 21:23]  Urine Urea Nitrogen 405      [03-23-24 @ 21:23]  Urine Potassium 16      [03-23-24 @ 21:23]  Urine Osmolality 273      [03-23-24 @ 21:23]    PTH -- (Ca 8.2)      [03-24-24 @ 20:00]   138  Vitamin D (25OH) 9      [03-24-24 @ 20:00]  Lipid: chol 144, , HDL 39, LDL --      [03-22-24 @ 06:14]

## 2024-03-26 NOTE — PROGRESS NOTE ADULT - ATTENDING COMMENTS
***My note supersedes any discrepancies that may be above in the resident's note***    89 yo woman with h/o htn, DVT/PE in 2018 and in 2019, HFrEF s/p aicd and ckd V who p/w AMS and abdominal pain after being discharged from osh ([facility] [place]) on 3/11 to complete macrobid abx course and now p/w ams and confusion with OMERO on CKD5    #Metabolic encephalopathy, improved  #OMERO on ckd stage V--2/2 dehydration and poor oral intake  #Resolved Cystitis  #Diarrhea---Enteritis  #HFrEF--s/p Aicd  #h/o DVT/PE  - urine cx: 3/21 E.coli(pan sensitive); repeat 3/22 negative?  - tele monitoring  - continue to monitor bmp closely  - nephrology following---no acute indication for RRT at this time   - hold nephrotoxic meds  - closely monitor vs-----can hold additional bp meds if becomes hypotensive (both entresto and lasix on hold---restart as cr and bp tolerates)  - continue ivf 50cc/hr with hc03   - lokelma 10g q24h for elevated potassium  -f/u ua/ucr/ulytes -> wnl   -primafit to monitor and document I/o  - palliative care consulted for GOC    # HTN  amLODIPine   Tablet 5 milliGRAM(s) Oral at bedtime  isosorbide   mononitrate ER Tablet (IMDUR) 30 milliGRAM(s) Oral daily  metoprolol succinate ER 50 milliGRAM(s) Oral daily    # Hx of dvt/pe  - on apixaban 2.5 milliGRAM(s) Oral every 12 hours; however dose reduction not appropriate for goal directed therapy. possible off label dosing after discussing bleeding risk with family? patient unsure. please confirm when family comes in    # HLD  atorvastatin 80 milliGRAM(s) Oral at bedtime     # Depression   - starting mirtazapine 7.5 milliGRAM(s) Oral daily for mood and appetite stimulation  sertraline 25 milliGRAM(s) Oral daily       DVT/GI ppx  FULL CODE--continue to readdress goc  dispo: tele    #Progress Note Handoff  Pending (specify): serial BMP, GOC with pall care c/s(was previously discharged on hospice). tried to engage patient but she seemed overwhelmed and was not able to provide any guidance on what she wants to do or if she wants family to make on her behalf.   Family discussion: pending  Disposition:  Unknown at this time________ .

## 2024-03-26 NOTE — PROGRESS NOTE ADULT - SUBJECTIVE AND OBJECTIVE BOX
24H events:    Patient is a 88y old Female who presents with a chief complaint of AMS (26 Mar 2024 09:14)    Primary diagnosis of OMERO (acute kidney injury)      Day 1:      Today is 4d of hospitalization. This morning patient was seen and examined at bedside, resting comfortably in bed.    No acute or major events overnight.    Code Status:      PAST MEDICAL & SURGICAL HISTORY  Chronic kidney disease    Hypertension    Gout    Diverticulitis  sigmoid    Rheumatoid arthritis    DVT, lower extremity  Bilateral    Pulmonary embolism    Chronic HFrEF (heart failure with reduced ejection fraction)    AICD (automatic cardioverter/defibrillator) present    Artificial pacemaker    History of appendectomy    History of tonsillectomy    History of hysterectomy    H/O hernia repair      SOCIAL HISTORY:  Social History:      ALLERGIES:  Keflex (Swelling)  cephalosporins (Angioedema)    MEDICATIONS:  STANDING MEDICATIONS  amLODIPine   Tablet 5 milliGRAM(s) Oral at bedtime  apixaban 2.5 milliGRAM(s) Oral every 12 hours  atorvastatin 80 milliGRAM(s) Oral at bedtime  calcium acetate 1334 milliGRAM(s) Oral every 12 hours  folic acid 1 milliGRAM(s) Oral daily  isosorbide   mononitrate ER Tablet (IMDUR) 30 milliGRAM(s) Oral daily  metoprolol succinate ER 50 milliGRAM(s) Oral daily  mirtazapine 7.5 milliGRAM(s) Oral daily  montelukast 10 milliGRAM(s) Oral daily  sertraline 25 milliGRAM(s) Oral daily  simethicone 80 milliGRAM(s) Chew daily  sodium bicarbonate 650 milliGRAM(s) Oral every 12 hours  sodium chloride 0.9%. 1000 milliLiter(s) IV Continuous <Continuous>    PRN MEDICATIONS  albuterol    90 MICROgram(s) HFA Inhaler 2 Puff(s) Inhalation every 6 hours PRN    VITALS:   T(F): 97.9  HR: 63  BP: 120/58  RR: 18  SpO2: --    PHYSICAL EXAM:  GENERAL:   ( x) NAD, lying in bed comfortably     (  ) obtunded     (  ) lethargic     (  ) somnolent    HEAD:   ( x) Atraumatic     (  ) hematoma     (  ) laceration (specify location:       )     NECK:  (x) Supple     (  ) neck stiffness     (  ) nuchal rigidity     (  )  no JVD     (  ) JVD present ( -- cm)    HEART:  Rate -->     (x) normal rate     (  ) bradycardic     (  ) tachycardic  Rhythm -->     (x) regular     (  ) regularly irregular     (  ) irregularly irregular  Murmurs -->     (x) normal s1s2     (  ) systolic murmur     (  ) diastolic murmur     (  ) continuous murmur      (  ) S3 present     (  ) S4 present    LUNGS:   ( x)Unlabored respirations     (  ) tachypnea  ( x) B/L air entry     (  ) decreased breath sounds in:  (location     )    ( x) no adventitious sound     (  ) crackles     (  ) wheezing      (  ) rhonchi      (specify location:       )  (  ) chest wall tenderness (specify location:       )    ABDOMEN:   ( x) Soft     (  ) tense   |   (  ) nondistended     (  ) distended   |   (  ) +BS     (  ) hypoactive bowel sounds     (  ) hyperactive bowel sounds  ( x) nontender     (  ) RUQ tenderness     (  ) RLQ tenderness     (  ) LLQ tenderness     (  ) epigastric tenderness     (  ) diffuse tenderness  (  ) Splenomegaly      (  ) Hepatomegaly      (  ) Jaundice     (  ) ecchymosis     EXTREMITIES:  ( x) Normal     (  ) Rash     (  ) ecchymosis     (  ) varicose veins      (  ) pitting edema     (  ) non-pitting edema   (  ) ulceration     (  ) gangrene:     (location:     )    NERVOUS SYSTEM:    ( x) A&Ox3     (  ) confused     (  ) lethargic  CN II-XII:     ( x) Intact     (  ) deficits found     (Specify:     )   Upper extremities:     (  ) no sensorimotor deficits     (  ) weakness     (  ) loss of proprioception/vibration     (  ) loss of touch/temperature (specify:    )  Lower extremities:     (  ) no sensorimotor deficits     (  ) weakness     (  ) loss of proprioception/vibration     (  ) loss of touch/temperature (specify:    )    SKIN:   (  ) No rashes or lesions     (  ) maculopapular rash     (  ) pustules     (  ) vesicles     (  ) ulcer     (  ) ecchymosis     (specify location:     )    AMPAC score :    (  ) Indwelling Morgan Catheter:   Date insterted:    Reason (  ) Critical illness     (  ) urinary retention    (  ) Accurate Ins/Outs Monitoring     (  ) CMO patient    (  ) Central Line:   Date inserted:  Location: (  ) Right IJ     (  ) Left IJ     (  ) Right Fem     (  ) Left Fem    (  ) SPC        (  ) pigtail       (  ) PEG tube       (  ) colostomy       (  ) jejunostomy  (  ) U-Dall    LABS:                        9.3    6.71  )-----------( 253      ( 26 Mar 2024 06:47 )             29.0     03-26    133<L>  |  99  |  88<HH>  ----------------------------<  83  4.7   |  20  |  5.8<HH>    Ca    8.1<L>      26 Mar 2024 06:47  Phos  3.9     03-26  Mg     1.9     03-26        Urinalysis Basic - ( 26 Mar 2024 06:47 )    Color: x / Appearance: x / SG: x / pH: x  Gluc: 83 mg/dL / Ketone: x  / Bili: x / Urobili: x   Blood: x / Protein: x / Nitrite: x   Leuk Esterase: x / RBC: x / WBC x   Sq Epi: x / Non Sq Epi: x / Bacteria: x                RADIOLOGY:    ASSESSMENT AND PLAN  RACHEL SUMMERS is a [id number] Female with a history significant for    -------------------------------------------------------------------------------------------------------------------------------------  #DVT PPX:    #GI PPX:    #Diet    #Code Status:    #Activity Order    #Dispo/Needs    #Handoff             24H events:    Patient is a 88y old Female who presents with a chief complaint of AMS (26 Mar 2024 09:14)    Primary diagnosis of OMERO (acute kidney injury)    Today is 4d of hospitalization. This morning patient was seen and examined at bedside, resting comfortably in bed. Patient reports feeling better and not complaining of any pain.     No acute or major events overnight.    Code Status: Full Code      PAST MEDICAL & SURGICAL HISTORY  Chronic kidney disease    Hypertension    Gout    Diverticulitis  sigmoid    Rheumatoid arthritis    DVT, lower extremity  Bilateral    Pulmonary embolism    Chronic HFrEF (heart failure with reduced ejection fraction)    AICD (automatic cardioverter/defibrillator) present    Artificial pacemaker    History of appendectomy    History of tonsillectomy    History of hysterectomy    H/O hernia repair    SOCIAL HISTORY:  Social History:    ALLERGIES:  Keflex (Swelling)  cephalosporins (Angioedema)    MEDICATIONS:  STANDING MEDICATIONS  amLODIPine   Tablet 5 milliGRAM(s) Oral at bedtime  apixaban 2.5 milliGRAM(s) Oral every 12 hours  atorvastatin 80 milliGRAM(s) Oral at bedtime  calcium acetate 1334 milliGRAM(s) Oral every 12 hours  folic acid 1 milliGRAM(s) Oral daily  isosorbide   mononitrate ER Tablet (IMDUR) 30 milliGRAM(s) Oral daily  metoprolol succinate ER 50 milliGRAM(s) Oral daily  mirtazapine 7.5 milliGRAM(s) Oral daily  montelukast 10 milliGRAM(s) Oral daily  sertraline 25 milliGRAM(s) Oral daily  simethicone 80 milliGRAM(s) Chew daily  sodium bicarbonate 650 milliGRAM(s) Oral every 12 hours  sodium chloride 0.9%. 1000 milliLiter(s) IV Continuous <Continuous>    PRN MEDICATIONS  albuterol    90 MICROgram(s) HFA Inhaler 2 Puff(s) Inhalation every 6 hours PRN    VITALS:   T(F): 97.9  HR: 63  BP: 120/58  RR: 18  SpO2: --    PHYSICAL EXAM:  GENERAL:   ( x) NAD, lying in bed comfortably     (  ) obtunded     (  ) lethargic     (  ) somnolent    HEAD:   ( x) Atraumatic     (  ) hematoma     (  ) laceration (specify location:       )     NECK:  (x) Supple     (  ) neck stiffness     (  ) nuchal rigidity     (  )  no JVD     (  ) JVD present ( -- cm)    HEART:  Rate -->     (x) normal rate     (  ) bradycardic     (  ) tachycardic  Rhythm -->     (x) regular     (  ) regularly irregular     (  ) irregularly irregular  Murmurs -->     (x) normal s1s2     (  ) systolic murmur     (  ) diastolic murmur     (  ) continuous murmur      (  ) S3 present     (  ) S4 present    LUNGS:   ( x)Unlabored respirations     (  ) tachypnea  ( x) B/L air entry     (  ) decreased breath sounds in:  (location     )    ( x) no adventitious sound     (  ) crackles     (  ) wheezing      (  ) rhonchi      (specify location:       )  (  ) chest wall tenderness (specify location:       )    ABDOMEN:   ( x) Soft     (  ) tense   |   (  ) nondistended     (  ) distended   |   (  ) +BS     (  ) hypoactive bowel sounds     (  ) hyperactive bowel sounds  ( x) nontender     (  ) RUQ tenderness     (  ) RLQ tenderness     (  ) LLQ tenderness     (  ) epigastric tenderness     (  ) diffuse tenderness  (  ) Splenomegaly      (  ) Hepatomegaly      (  ) Jaundice     (  ) ecchymosis     EXTREMITIES:  ( x) Normal     (  ) Rash     (  ) ecchymosis     (  ) varicose veins      (  ) pitting edema     (  ) non-pitting edema   (  ) ulceration     (  ) gangrene:     (location:     )    NERVOUS SYSTEM:    (x) A&Ox3     (  ) confused     (  ) lethargic  CN II-XII:     ( x) Intact     (  ) deficits found     (Specify:     )   Upper extremities:     (  ) no sensorimotor deficits     (  ) weakness     (  ) loss of proprioception/vibration     (  ) loss of touch/temperature (specify:    )  Lower extremities:     (  ) no sensorimotor deficits     (  ) weakness     (  ) loss of proprioception/vibration     (  ) loss of touch/temperature (specify:    )    SKIN:   (  ) No rashes or lesions     (  ) maculopapular rash     (  ) pustules     (  ) vesicles     (  ) ulcer     (  ) ecchymosis     (specify location:     )    LABS:                        9.3    6.71  )-----------( 253      ( 26 Mar 2024 06:47 )             29.0     03-26    133<L>  |  99  |  88<HH>  ----------------------------<  83  4.7   |  20  |  5.8<HH>    Ca    8.1<L>      26 Mar 2024 06:47  Phos  3.9     03-26  Mg     1.9     03-26    Urinalysis Basic - ( 26 Mar 2024 06:47 )    Color: x / Appearance: x / SG: x / pH: x  Gluc: 83 mg/dL / Ketone: x  / Bili: x / Urobili: x   Blood: x / Protein: x / Nitrite: x   Leuk Esterase: x / RBC: x / WBC x   Sq Epi: x / Non Sq Epi: x / Bacteria: x    RADIOLOGY:  < from: CT Abdomen and Pelvis w/ IV Cont (03.22.24 @ 02:07) >  IMPRESSION:    1. Prominent fluid-filled loops of small bowel with mild mucosal   enhancement, may represent enteritis in the appropriate clinical setting.    2. The urinary bladder is thickened consistent with patient's current   urinary tract infection.    Other chronic/incidental findings as above.    --- End of Report ---    < from: CT Head No Cont (03.22.24 @ 02:07) >  IMPRESSION:    No acute intracranial pathology.    --- End of Report ---    < end of copied text >    ASSESSMENT AND PLAN  RACHEL SUMMERS is a [id number] Female with a history significant for    -------------------------------------------------------------------------------------------------------------------------------------  #DVT PPX:    #GI PPX:    #Diet    #Code Status:    #Activity Order    #Dispo/Needs    #Handoff             24H events:    Patient is a 88y old Female who presents with a chief complaint of AMS (26 Mar 2024 09:14)    Primary diagnosis of OMERO (acute kidney injury)    Today is 4d of hospitalization. This morning patient was seen and examined at bedside, resting comfortably in bed. Patient reports feeling better and not complaining of any pain. Hemodynamically stable, tolerating oral diet, voiding appropriately with appropriate bowel movements.    No acute or major events overnight.    Code Status: Full Code      PAST MEDICAL & SURGICAL HISTORY  Chronic kidney disease    Hypertension    Gout    Diverticulitis  sigmoid    Rheumatoid arthritis    DVT, lower extremity  Bilateral    Pulmonary embolism    Chronic HFrEF (heart failure with reduced ejection fraction)    AICD (automatic cardioverter/defibrillator) present    Artificial pacemaker    History of appendectomy    History of tonsillectomy    History of hysterectomy    H/O hernia repair    SOCIAL HISTORY:  Social History:    ALLERGIES:  Keflex (Swelling)  cephalosporins (Angioedema)    MEDICATIONS:  STANDING MEDICATIONS  amLODIPine   Tablet 5 milliGRAM(s) Oral at bedtime  apixaban 2.5 milliGRAM(s) Oral every 12 hours  atorvastatin 80 milliGRAM(s) Oral at bedtime  calcium acetate 1334 milliGRAM(s) Oral every 12 hours  folic acid 1 milliGRAM(s) Oral daily  isosorbide   mononitrate ER Tablet (IMDUR) 30 milliGRAM(s) Oral daily  metoprolol succinate ER 50 milliGRAM(s) Oral daily  mirtazapine 7.5 milliGRAM(s) Oral daily  montelukast 10 milliGRAM(s) Oral daily  sertraline 25 milliGRAM(s) Oral daily  simethicone 80 milliGRAM(s) Chew daily  sodium bicarbonate 650 milliGRAM(s) Oral every 12 hours  sodium chloride 0.9%. 1000 milliLiter(s) IV Continuous <Continuous>    PRN MEDICATIONS  albuterol    90 MICROgram(s) HFA Inhaler 2 Puff(s) Inhalation every 6 hours PRN    VITALS:   T(F): 97.9  HR: 63  BP: 120/58  RR: 18  SpO2: --    PHYSICAL EXAM:  GENERAL: Depressed affect  ( x) NAD, lying in bed comfortably     (  ) obtunded     (  ) lethargic     (  ) somnolent    HEAD:   ( x) Atraumatic     (  ) hematoma     (  ) laceration (specify location:       )     NECK:  (x) Supple     (  ) neck stiffness     (  ) nuchal rigidity     (  )  no JVD     (  ) JVD present ( -- cm)    HEART:  Rate -->     (x) normal rate     (  ) bradycardic     (  ) tachycardic  Rhythm -->     (x) regular     (  ) regularly irregular     (  ) irregularly irregular  Murmurs -->     (x) normal s1s2     (  ) systolic murmur     (  ) diastolic murmur     (  ) continuous murmur      (  ) S3 present     (  ) S4 present    LUNGS:   ( x)Unlabored respirations     (  ) tachypnea  ( x) B/L air entry     (  ) decreased breath sounds in:  (location     )    ( x) no adventitious sound     (  ) crackles     (  ) wheezing      (  ) rhonchi      (specify location:       )  (  ) chest wall tenderness (specify location:       )    ABDOMEN:   ( x) Soft     (  ) tense   |   (  ) nondistended     (  ) distended   |   (  ) +BS     (  ) hypoactive bowel sounds     (  ) hyperactive bowel sounds  ( x) nontender     (  ) RUQ tenderness     (  ) RLQ tenderness     (  ) LLQ tenderness     (  ) epigastric tenderness     (  ) diffuse tenderness  (  ) Splenomegaly      (  ) Hepatomegaly      (  ) Jaundice     (  ) ecchymosis     EXTREMITIES: strenth and sensation intact and symmetrical  ( x) Normal     (  ) Rash     (  ) ecchymosis     (  ) varicose veins      (  ) pitting edema     (  ) non-pitting edema   (  ) ulceration     (  ) gangrene:     (location:     )    NERVOUS SYSTEM:    (x) A&Ox3     (  ) confused     (  ) lethargic  CN II-XII:     ( x) Intact     (  ) deficits found     (Specify:     )   Upper extremities:     (  ) no sensorimotor deficits     (  ) weakness     (  ) loss of proprioception/vibration     (  ) loss of touch/temperature (specify:    )  Lower extremities:     (  ) no sensorimotor deficits     (  ) weakness     (  ) loss of proprioception/vibration     (  ) loss of touch/temperature (specify:    )    SKIN:   (  ) No rashes or lesions     (  ) maculopapular rash     (  ) pustules     (  ) vesicles     (  ) ulcer     (  ) ecchymosis     (specify location:     )    LABS:                        9.3    6.71  )-----------( 253      ( 26 Mar 2024 06:47 )             29.0     03-26    133<L>  |  99  |  88<HH>  ----------------------------<  83  4.7   |  20  |  5.8<HH>    Ca    8.1<L>      26 Mar 2024 06:47  Phos  3.9     03-26  Mg     1.9     03-26    Urinalysis Basic - ( 26 Mar 2024 06:47 )    Color: x / Appearance: x / SG: x / pH: x  Gluc: 83 mg/dL / Ketone: x  / Bili: x / Urobili: x   Blood: x / Protein: x / Nitrite: x   Leuk Esterase: x / RBC: x / WBC x   Sq Epi: x / Non Sq Epi: x / Bacteria: x    RADIOLOGY:  < from: CT Abdomen and Pelvis w/ IV Cont (03.22.24 @ 02:07) >  IMPRESSION:    1. Prominent fluid-filled loops of small bowel with mild mucosal   enhancement, may represent enteritis in the appropriate clinical setting.    2. The urinary bladder is thickened consistent with patient's current   urinary tract infection.    Other chronic/incidental findings as above.    --- End of Report ---    < from: CT Head No Cont (03.22.24 @ 02:07) >  IMPRESSION:    No acute intracranial pathology.    --- End of Report ---    < end of copied text >    ASSESSMENT AND PLAN  RACHEL SUMMERS is a 87 yo woman with h/o htn, DVT/PE in 2018 and in 2019, HFrEF s/p aicd and ckd V who p/w AMS and abdominal pain after being discharged from [facility] ([facility]) on 3/11 to complete macrobid abx course and now p/w ams and confusion with OMERO on CKD5    #Metabolic encephalopathy, improved  #OMERO on ckd stage V--2/2 dehydration and poor oral intake  #Resolved Cystitis  #Diarrhea---Enteritis  #HFrEF--s/p Aicd  #h/o DVT/PE  -tele monitoring  -continue to monitor bmp closely  -nephrology following---no acute indication for RRT at this time   -hold nephrotoxic meds  -closely monitor vs-----can hold additional bp meds if becomes hypotensive (both entresto and lasix on hold---restart as cr and bp tolerates)  -continue ivf 50cc/hr with hc03   - lokelma 10g q24h for elevated potassium   bcx and ucx- neg  -check stool study pending collection  -eps interrogated device   -continue eliquis 2.5 mg bid  -f/u ua/ucr/ulytes -> wnl   -primafit to monitor and document I/o  - palliative care consulted for GOC    DVT/GI ppx  FULL CODE--continue to readdress goc  dispo: tele  pend: GOC with palliative

## 2024-03-26 NOTE — CONSULT NOTE ADULT - SUBJECTIVE AND OBJECTIVE BOX
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  PAtient states she has lower abdominal pain with persistent dysuria. She says she had CP but doesn't remember when and how it was.  Per the ED note, patient's grandson said she is normally AAOx3 with good cognition but since her hospitalization she has not been herself. Tonight they tried to get her ready for bed and she was combative in trying to get her in her pajamas so he brought her to the ED.     ED vitals normal. labs showed hb 10 (at BL), Na 127, K 6.5 hemolyzed --> 4.9 on vbg, AG 16, BUN/CRea 119/4.8, , AST 53,   trop 74 --> 78, pro BNP 12.9K   EKG: NSR, vpaced, new TWI in inferolateral leads     CT abd pelv: 1. Prominent fluid-filled loops of small bowel with mild mucosal enhancement, may represent enteritis in the appropriate clinical setting.  2. The urinary bladder is thickened consistent with patient's current urinary tract infection.  Other chronic/incidental findings as above.  CTH: No acute intracranial pathology.    patient received levaquin and was admitted to telemetry for AMS and chest pain w/u and management.  (22 Mar 2024 04:02)    PERTINENT PM/SXH:   Chronic kidney disease    Pacemaker    Hypertension    Gout    Diverticulitis    Atrial fibrillation    Diastolic heart failure    Rheumatoid arthritis    DVT, lower extremity    Pulmonary embolism    Chronic HFrEF (heart failure with reduced ejection fraction)    AICD (automatic cardioverter/defibrillator) present      Artificial pacemaker    History of appendectomy    History of tonsillectomy    History of hysterectomy    H/O hernia repair      FAMILY HISTORY:  FH: arthritis  sister      ITEMS NOT CHECKED ARE NOT PRESENT    SOCIAL HISTORY:   Significant other/partner[ ]  Children[ ]  Anabaptism/Spirituality:  Substance hx:  [ ]   Tobacco hx:  [ ]   Alcohol hx: [ ]   Living Situation: [ ]Home  [ ]Long term care  [ ]Rehab [ ]Other  Home Services: [ ] HHA [ ] Filipe RN [ ] Hospice  Occupation:  Home Opioid hx:  [ ] Y [ ] N [ ] I-Stop Reference No:     ADVANCE DIRECTIVES:    [ ] Full Code [ ] DNR  MOLST  [ ]  Living Will  [ ]   DECISION MAKER(s):  [ ] Health Care Proxy(s)  [ ] Surrogate(s)  [ ] Guardian           Name(s): Phone Number(s):    BASELINE (I)ADL(s) (prior to admission):  [place]: [ ]Total  [ ] Moderate [ ]Dependent  Palliative Performance Status Version 2:         %    http://npcrc.org/files/news/palliative_performance_scale_ppsv2.pdf    Allergies    Keflex (Swelling)  cephalosporins (Angioedema)    Intolerances    MEDICATIONS  (STANDING):  amLODIPine   Tablet 5 milliGRAM(s) Oral at bedtime  apixaban 2.5 milliGRAM(s) Oral every 12 hours  atorvastatin 80 milliGRAM(s) Oral at bedtime  calcium acetate 1334 milliGRAM(s) Oral every 12 hours  folic acid 1 milliGRAM(s) Oral daily  isosorbide   mononitrate ER Tablet (IMDUR) 30 milliGRAM(s) Oral daily  metoprolol succinate ER 50 milliGRAM(s) Oral daily  mirtazapine 7.5 milliGRAM(s) Oral daily  montelukast 10 milliGRAM(s) Oral daily  sertraline 25 milliGRAM(s) Oral daily  simethicone 80 milliGRAM(s) Chew daily  sodium bicarbonate 650 milliGRAM(s) Oral every 12 hours  sodium chloride 0.9%. 1000 milliLiter(s) (50 mL/Hr) IV Continuous <Continuous>    MEDICATIONS  (PRN):  albuterol    90 MICROgram(s) HFA Inhaler 2 Puff(s) Inhalation every 6 hours PRN for shortness of breath and/or wheezing      PRESENT SYMPTOMS: [ ]Unable to obtain due to poor mentation   Source if other than patient:  [ ]Family   [ ]Team     Pain: [ ]yes [ ]no  QOL impact -   Location -                    Aggravating factors -  Alleviating factors -   Quality -  Radiation -  Timing -   Severity (0-10 scale):  Minimal acceptable level (0-10 scale):     CPOT:    https://www.sccm.org/getattachment/bjc31r60-0x6w-0e3a-[email or web address]-1271k6907s2h/Critical-Care-Pain-Observation-Tool-(CPOT)    PAIN AD Score:   http://geriatrictoolkit.[facility]/cog/painad.pdf     Dyspnea:                           [ ]None[ ]Mild [ ]Moderate [ ]Severe     Respiratory Distress Observation Scale (RDOS):   A score of 0 to 2 signifies little or no respiratory distress, 3 signifies mild distress, scores 4 to 6 indicate moderate distress, and scores greater than 7 signify severe distress  https://www.[facility].ca/sites/default/files/PDFS/[email or web address]-dcgcqcqgfcs-ryqibgac-jbtoaycgfhp-wubzh.pdf    Anxiety:                             [ ]None[ ]Mild [ ]Moderate [ ]Severe   Fatigue:                             [ ]None[ ]Mild [ ]Moderate [ ]Severe   Nausea:                             [ ]None[ ]Mild [ ]Moderate [ ]Severe   Loss of appetite:              [ ]None[ ]Mild [ ]Moderate [ ]Severe   Constipation:                    [ ]None[ ]Mild [ ]Moderate [ ]Severe    Other Symptoms:  [ ]All other review of systems negative     Palliative Performance Status Version 2:         %    http://[facility].org/files/news/palliative_performance_scale_ppsv2.pdf  PHYSICAL EXAM:  Vital Signs Last 24 Hrs  T(C): 36.6 (26 Mar 2024 05:43), Max: 36.6 (26 Mar 2024 05:43)  T(F): 97.9 (26 Mar 2024 05:43), Max: 97.9 (26 Mar 2024 05:43)  HR: 63 (26 Mar 2024 05:43) (63 - 66)  BP: 120/58 (26 Mar 2024 05:43) (120/58 - 143/67)  BP(mean): --  RR: 18 (26 Mar 2024 05:43) (18 - 18)  SpO2: --     I&O's Summary    25 Mar 2024 07:01  -  26 Mar 2024 07:00  --------------------------------------------------------  IN: 519 mL / OUT: 800 mL / NET: -281 mL        GENERAL:  NAD   PULMONARY:  Non labored breathing  NEUROLOGIC: Grossly intact  BEHAVIORAL/PSYCH:  Calm.     CRITICAL CARE:  [ ] Shock Present  [ ]Septic [ ]Cardiogenic [ ]Neurologic [ ]Hypovolemic  [ ]  Vasopressors [ ]  Inotropes   [ ]Respiratory failure present [ ]Mechanical ventilation [ ]Non-invasive ventilatory support [ ]High flow  [ ]Acute  [ ]Chronic [ ]Hypoxic  [ ]Hypercarbic [ ]Other  [ ]Other organ failure     LABS: I have reviewed daily labs                        9.3    6.71  )-----------( 253      ( 26 Mar 2024 06:47 )             29.0   03-26    133<L>  |  99  |  88<HH>  ----------------------------<  83  4.7   |  20  |  5.8<HH>    Ca    8.1<L>      26 Mar 2024 06:47  Phos  3.9     03-26  Mg     1.9     03-26        RADIOLOGY & ADDITIONAL STUDIES: I have reviewed new imaging    PROTEIN CALORIE MALNUTRITION PRESENT: [ ]mild [ ]moderate [ ]severe [ ]underweight [ ]morbid obesity  https://www.andeal.org/vault/2440/web/files/ONC/Table_Clinical%20Characteristics%20to%20Document%20Malnutrition-White%20JV%20et%20al%202012.pdf    Height (cm): 160 (03-23-24 @ 09:42)  Weight (kg): 57.1 (03-23-24 @ 09:42)  BMI (kg/m2): 22.3 (03-23-24 @ 09:42)    [ ]PPSV2 < or = to 30% [ ]significant weight loss  [ ]poor nutritional intake  [ ]anasarca      [ ]Artificial Nutrition      Palliative Care Spiritual/Emotional Screening Tool Question  Severity (0-4):                    OR                    [ x] Unable to determine. Will assess at later time if appropriate.  Score of 2 or greater indicates recommendation of Chaplaincy and/or SW referral  Chaplaincy Referral: [ ] Yes [ ] Refused [ ] Following     Caregiver [place]:  [ ] Yes [ ] No    OR    [x ] Unable to determine. Will assess at later time if appropriate.  Social Work Referral [ ]  Patient and Family Centered Care Referral [ ]    Anticipatory Grief Present: [ ] Yes [ ] No    OR     [ x] Unable to determine. Will assess at later time if appropriate.  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  PAtient states she has lower abdominal pain with persistent dysuria. She says she had CP but doesn't remember when and how it was.  Per the ED note, patient's grandson said she is normally AAOx3 with good cognition but since her hospitalization she has not been herself. Tonight they tried to get her ready for bed and she was combative in trying to get her in her pajamas so he brought her to the ED.     ED vitals normal. labs showed hb 10 (at BL), Na 127, K 6.5 hemolyzed --> 4.9 on vbg, AG 16, BUN/CRea 119/4.8, , AST 53,   trop 74 --> 78, pro BNP 12.9K   EKG: NSR, vpaced, new TWI in inferolateral leads     CT abd pelv: 1. Prominent fluid-filled loops of small bowel with mild mucosal enhancement, may represent enteritis in the appropriate clinical setting.  2. The urinary bladder is thickened consistent with patient's current urinary tract infection.  Other chronic/incidental findings as above.  CTH: No acute intracranial pathology.    patient received levaquin and was admitted to telemetry for AMS and chest pain w/u and management.  (22 Mar 2024 04:02)    Patient intermittently answers questions. No family at bedside.     PERTINENT PM/SXH:   Chronic kidney disease    Pacemaker    Hypertension    Gout    Diverticulitis    Atrial fibrillation    Diastolic heart failure    Rheumatoid arthritis    DVT, lower extremity    Pulmonary embolism    Chronic HFrEF (heart failure with reduced ejection fraction)    AICD (automatic cardioverter/defibrillator) present      Artificial pacemaker    History of appendectomy    History of tonsillectomy    History of hysterectomy    H/O hernia repair      FAMILY HISTORY:  FH: arthritis  sister      ITEMS NOT CHECKED ARE NOT PRESENT    SOCIAL HISTORY:   Significant other/partner[ ]  Children[x ]  Rastafarian/Spirituality:  Substance hx:  [ ]   Tobacco hx:  [ ]   Alcohol hx: [ ]   Living Situation: [ ]Home  [ ]Long term care  [ ]Rehab [ ]Other  Home Services: [ ] HHA [ ] Filipe RN [ ] Hospice  Occupation:  Home Opioid hx:  [ ] Y [ ] N [ ] I-Stop Reference No:     ADVANCE DIRECTIVES:    [ ] Full Code [ x] DNR  MOLST  [x ]  Living Will  [ ]   DECISION MAKER(s):  [x ] Health Care Proxy(s)  [ ] Surrogate(s)  [ ] Guardian           Name(s):  Leona Juan    BASELINE (I)ADL(s) (prior to admission):  [place]: [ ]Total  [ ] Moderate [ ]Dependent  Palliative Performance Status Version 2:         %    http://npcrc.org/files/news/palliative_performance_scale_ppsv2.pdf    Allergies    Keflex (Swelling)  cephalosporins (Angioedema)    Intolerances    MEDICATIONS  (STANDING):  amLODIPine   Tablet 5 milliGRAM(s) Oral at bedtime  apixaban 2.5 milliGRAM(s) Oral every 12 hours  atorvastatin 80 milliGRAM(s) Oral at bedtime  calcium acetate 1334 milliGRAM(s) Oral every 12 hours  folic acid 1 milliGRAM(s) Oral daily  isosorbide   mononitrate ER Tablet (IMDUR) 30 milliGRAM(s) Oral daily  metoprolol succinate ER 50 milliGRAM(s) Oral daily  mirtazapine 7.5 milliGRAM(s) Oral daily  montelukast 10 milliGRAM(s) Oral daily  sertraline 25 milliGRAM(s) Oral daily  simethicone 80 milliGRAM(s) Chew daily  sodium bicarbonate 650 milliGRAM(s) Oral every 12 hours  sodium chloride 0.9%. 1000 milliLiter(s) (50 mL/Hr) IV Continuous <Continuous>    MEDICATIONS  (PRN):  albuterol    90 MICROgram(s) HFA Inhaler 2 Puff(s) Inhalation every 6 hours PRN for shortness of breath and/or wheezing      PRESENT SYMPTOMS: [x ]Unable to obtain due to poor mentation   Source if other than patient:  [ ]Family   [ ]Team     Pain: [ ]yes [ ]no  QOL impact -   Location -                    Aggravating factors -  Alleviating factors -   Quality -  Radiation -  Timing -   Severity (0-10 scale):  Minimal acceptable level (0-10 scale):     CPOT:    https://www.[facility].org/getattachment/stt34y33-5x4j-0b1t-[email or web address]-1524v4874u4a/Critical-Care-Pain-Observation-Tool-(CPOT)    PAIN AD Score: 2  http://geriatrictoolkit.[facility]/cog/painad.pdf     Dyspnea:                           [ ]None[ ]Mild [ ]Moderate [ ]Severe     Respiratory Distress Observation Scale (RDOS): 1  A score of 0 to 2 signifies little or no respiratory distress, 3 signifies mild distress, scores 4 to 6 indicate moderate distress, and scores greater than 7 signify severe distress  https://www.[facility].ca/sites/default/files/PDFS/685024-crqdriasqoa-wzjjunsv-pajivyckgdf-vvjtl.pdf    Anxiety:                             [ ]None[ ]Mild [ ]Moderate [ ]Severe   Fatigue:                             [ ]None[ ]Mild [ ]Moderate [ ]Severe   Nausea:                             [ ]None[ ]Mild [ ]Moderate [ ]Severe   Loss of appetite:              [ ]None[ ]Mild [ ]Moderate [ ]Severe   Constipation:                    [ ]None[ ]Mild [ ]Moderate [ ]Severe    Other Symptoms:  [ ]All other review of systems negative     Palliative Performance Status Version 2:         %    http://[facility].org/files/news/palliative_performance_scale_ppsv2.pdf  PHYSICAL EXAM:  Vital Signs Last 24 Hrs  T(C): 36.6 (26 Mar 2024 05:43), Max: 36.6 (26 Mar 2024 05:43)  T(F): 97.9 (26 Mar 2024 05:43), Max: 97.9 (26 Mar 2024 05:43)  HR: 63 (26 Mar 2024 05:43) (63 - 66)  BP: 120/58 (26 Mar 2024 05:43) (120/58 - 143/67)  BP(mean): --  RR: 18 (26 Mar 2024 05:43) (18 - 18)  SpO2: --     I&O's Summary    25 Mar 2024 07:01  -  26 Mar 2024 07:00  --------------------------------------------------------  IN: 519 mL / OUT: 800 mL / NET: -281 mL        GENERAL:  NAD   PULMONARY:  Non labored breathing  NEUROLOGIC: Alert. Intermittently answers questions.   BEHAVIORAL/PSYCH:  Calm.     CRITICAL CARE:  [ ] Shock Present  [ ]Septic [ ]Cardiogenic [ ]Neurologic [ ]Hypovolemic  [ ]  Vasopressors [ ]  Inotropes   [ ]Respiratory failure present [ ]Mechanical ventilation [ ]Non-invasive ventilatory support [ ]High flow  [ ]Acute  [ ]Chronic [ ]Hypoxic  [ ]Hypercarbic [ ]Other  [ ]Other organ failure     LABS: I have reviewed daily labs                        9.3    6.71  )-----------( 253      ( 26 Mar 2024 06:47 )             29.0   03-26    133<L>  |  99  |  88<HH>  ----------------------------<  83  4.7   |  20  |  5.8<HH>    Ca    8.1<L>      26 Mar 2024 06:47  Phos  3.9     03-26  Mg     1.9     03-26        RADIOLOGY & ADDITIONAL STUDIES: I have reviewed new imaging    PROTEIN CALORIE MALNUTRITION PRESENT: [ ]mild [ ]moderate [ ]severe [ ]underweight [ ]morbid obesity  https://www.andeal.org/vault/2440/web/files/ONC/Table_Clinical%20Characteristics%20to%20Document%20Malnutrition-White%20JV%20et%20al%202012.pdf    Height (cm): 160 (03-23-24 @ 09:42)  Weight (kg): 57.1 (03-23-24 @ 09:42)  BMI (kg/m2): 22.3 (03-23-24 @ 09:42)    [ ]PPSV2 < or = to 30% [ ]significant weight loss  [ ]poor nutritional intake  [ ]anasarca      [ ]Artificial Nutrition      Palliative Care Spiritual/Emotional Screening Tool Question  Severity (0-4):                    OR                    [ x] Unable to determine. Will assess at later time if appropriate.  Score of 2 or greater indicates recommendation of Chaplaincy and/or SW referral  Chaplaincy Referral: [ ] Yes [ ] Refused [ ] Following     Caregiver [place]:  [ ] Yes [ ] No    OR    [x ] Unable to determine. Will assess at later time if appropriate.  Social Work Referral [ ]  Patient and Family Centered Care Referral [ ]    Anticipatory Grief Present: [ ] Yes [ ] No    OR     [ x] Unable to determine. Will assess at later time if appropriate.  Social Work Referral [ ]  Patient and Family Centered Care Referral [ ]    REFERRALS:   [ ]Chaplaincy  [ ]Hospice  [ ]Child Life  [ ]Social Work  [ ]Case management [ ]Holistic Therapy     Palliative care education provided to patient and/or family

## 2024-03-26 NOTE — CONSULT NOTE ADULT - CONVERSATION DETAILS
Patient does not consistently answer questions, but states that she has never been on hospice and she is not interested in hospice. She also nods in agreement that she would not want to be on ventilator. She does not respond when questioned about CPR. Called patient's daughter, Leona (primary HCP), but she did not answer. Subsequently called patient's son, Drake (alternate HCP). He also states that patient has never been on hospice. He is not sure about code status and wants to discuss the issue with the patient and other family members. He understands that patient currently has DNR/DNI order in place.

## 2024-03-26 NOTE — CONSULT NOTE ADULT - ASSESSMENT
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     Patient does not consistently answer questions, but states that she has never been on hospice and she is not interested in hospice. She also nods in agreement that she would not want to be on ventilator. She does not respond when questioned about CPR. Called patient's daughter, Leona (primary HCP), but she did not answer. Subsequently called patient's son, Drake (alternate HCP). He also states that patient has never been on hospice. He is not sure about code status and wants to discuss the issue with the patient and other family members. He understands that patient currently has DNR/DNI order in place.     Plan:  - symptoms per primary team  - ongoing GOC discussions    - will continue attempts to reach patient's primary HCP/daughterLeona    Palliative care will continue to follow.   Please call [phone] with questions or concerns 24/7.   _____________  Juan Mzaariegos MD  Palliative Medicine  [facility]   of Geriatric and Palliative Medicine  [phone]

## 2024-03-27 DIAGNOSIS — I50.9 HEART FAILURE, UNSPECIFIED: ICD-10-CM

## 2024-03-27 DIAGNOSIS — F41.8 OTHER SPECIFIED ANXIETY DISORDERS: ICD-10-CM

## 2024-03-27 LAB
ALBUMIN SERPL ELPH-MCNC: 2.8 G/DL — LOW (ref 3.5–5.2)
ALP SERPL-CCNC: 99 U/L — SIGNIFICANT CHANGE UP (ref 30–115)
ALT FLD-CCNC: 15 U/L — SIGNIFICANT CHANGE UP (ref 0–41)
ANION GAP SERPL CALC-SCNC: 11 MMOL/L — SIGNIFICANT CHANGE UP (ref 7–14)
APPEARANCE UR: CLEAR — SIGNIFICANT CHANGE UP
AST SERPL-CCNC: 30 U/L — SIGNIFICANT CHANGE UP (ref 0–41)
BACTERIA # UR AUTO: NEGATIVE /HPF — SIGNIFICANT CHANGE UP
BILIRUB DIRECT SERPL-MCNC: <0.2 MG/DL — SIGNIFICANT CHANGE UP (ref 0–0.3)
BILIRUB INDIRECT FLD-MCNC: >0.1 MG/DL — LOW (ref 0.2–1.2)
BILIRUB SERPL-MCNC: 0.3 MG/DL — SIGNIFICANT CHANGE UP (ref 0.2–1.2)
BILIRUB UR-MCNC: NEGATIVE — SIGNIFICANT CHANGE UP
BUN SERPL-MCNC: 86 MG/DL — CRITICAL HIGH (ref 10–20)
CALCIUM SERPL-MCNC: 7.9 MG/DL — LOW (ref 8.4–10.5)
CAST: 1 /LPF — SIGNIFICANT CHANGE UP (ref 0–4)
CHLORIDE SERPL-SCNC: 102 MMOL/L — SIGNIFICANT CHANGE UP (ref 98–110)
CO2 SERPL-SCNC: 22 MMOL/L — SIGNIFICANT CHANGE UP (ref 17–32)
COLOR SPEC: YELLOW — SIGNIFICANT CHANGE UP
CREAT SERPL-MCNC: 5.6 MG/DL — CRITICAL HIGH (ref 0.7–1.5)
CULTURE RESULTS: SIGNIFICANT CHANGE UP
DIFF PNL FLD: ABNORMAL
EGFR: 7 ML/MIN/1.73M2 — LOW
GLUCOSE SERPL-MCNC: 76 MG/DL — SIGNIFICANT CHANGE UP (ref 70–99)
GLUCOSE UR QL: NEGATIVE MG/DL — SIGNIFICANT CHANGE UP
HCT VFR BLD CALC: 28 % — LOW (ref 37–47)
HGB BLD-MCNC: 8.8 G/DL — LOW (ref 12–16)
KETONES UR-MCNC: NEGATIVE MG/DL — SIGNIFICANT CHANGE UP
LEUKOCYTE ESTERASE UR-ACNC: ABNORMAL
MAGNESIUM SERPL-MCNC: 1.9 MG/DL — SIGNIFICANT CHANGE UP (ref 1.8–2.4)
MCHC RBC-ENTMCNC: 27.8 PG — SIGNIFICANT CHANGE UP (ref 27–31)
MCHC RBC-ENTMCNC: 31.4 G/DL — LOW (ref 32–37)
MCV RBC AUTO: 88.6 FL — SIGNIFICANT CHANGE UP (ref 81–99)
NITRITE UR-MCNC: NEGATIVE — SIGNIFICANT CHANGE UP
NRBC # BLD: 0 /100 WBCS — SIGNIFICANT CHANGE UP (ref 0–0)
PH UR: 8 — SIGNIFICANT CHANGE UP (ref 5–8)
PLATELET # BLD AUTO: 222 K/UL — SIGNIFICANT CHANGE UP (ref 130–400)
PMV BLD: 8.9 FL — SIGNIFICANT CHANGE UP (ref 7.4–10.4)
POTASSIUM SERPL-MCNC: 4.8 MMOL/L — SIGNIFICANT CHANGE UP (ref 3.5–5)
POTASSIUM SERPL-SCNC: 4.8 MMOL/L — SIGNIFICANT CHANGE UP (ref 3.5–5)
PROT SERPL-MCNC: 4.9 G/DL — LOW (ref 6–8)
PROT UR-MCNC: 300 MG/DL
RBC # BLD: 3.16 M/UL — LOW (ref 4.2–5.4)
RBC # FLD: 13.4 % — SIGNIFICANT CHANGE UP (ref 11.5–14.5)
RBC CASTS # UR COMP ASSIST: 3 /HPF — SIGNIFICANT CHANGE UP (ref 0–4)
SODIUM SERPL-SCNC: 135 MMOL/L — SIGNIFICANT CHANGE UP (ref 135–146)
SP GR SPEC: 1.01 — SIGNIFICANT CHANGE UP (ref 1–1.03)
SPECIMEN SOURCE: SIGNIFICANT CHANGE UP
SQUAMOUS # UR AUTO: 1 /HPF — SIGNIFICANT CHANGE UP (ref 0–5)
UROBILINOGEN FLD QL: 0.2 MG/DL — SIGNIFICANT CHANGE UP (ref 0.2–1)
WBC # BLD: 9.34 K/UL — SIGNIFICANT CHANGE UP (ref 4.8–10.8)
WBC # FLD AUTO: 9.34 K/UL — SIGNIFICANT CHANGE UP (ref 4.8–10.8)
WBC UR QL: 2 /HPF — SIGNIFICANT CHANGE UP (ref 0–5)

## 2024-03-27 PROCEDURE — 99233 SBSQ HOSP IP/OBS HIGH 50: CPT | Mod: 25

## 2024-03-27 PROCEDURE — 71045 X-RAY EXAM CHEST 1 VIEW: CPT | Mod: 26

## 2024-03-27 PROCEDURE — 99233 SBSQ HOSP IP/OBS HIGH 50: CPT

## 2024-03-27 PROCEDURE — 99497 ADVNCD CARE PLAN 30 MIN: CPT

## 2024-03-27 RX ADMIN — Medication 650 MILLIGRAM(S): at 18:18

## 2024-03-27 RX ADMIN — Medication 50 MILLIGRAM(S): at 05:30

## 2024-03-27 RX ADMIN — SERTRALINE 25 MILLIGRAM(S): 25 TABLET, FILM COATED ORAL at 12:23

## 2024-03-27 RX ADMIN — ISOSORBIDE MONONITRATE 30 MILLIGRAM(S): 60 TABLET, EXTENDED RELEASE ORAL at 12:23

## 2024-03-27 RX ADMIN — SIMETHICONE 80 MILLIGRAM(S): 80 TABLET, CHEWABLE ORAL at 12:24

## 2024-03-27 RX ADMIN — AMLODIPINE BESYLATE 5 MILLIGRAM(S): 2.5 TABLET ORAL at 21:33

## 2024-03-27 RX ADMIN — Medication 1 MILLIGRAM(S): at 12:23

## 2024-03-27 RX ADMIN — APIXABAN 2.5 MILLIGRAM(S): 2.5 TABLET, FILM COATED ORAL at 05:30

## 2024-03-27 RX ADMIN — Medication 650 MILLIGRAM(S): at 05:31

## 2024-03-27 RX ADMIN — APIXABAN 2.5 MILLIGRAM(S): 2.5 TABLET, FILM COATED ORAL at 18:20

## 2024-03-27 RX ADMIN — Medication 1334 MILLIGRAM(S): at 05:31

## 2024-03-27 RX ADMIN — MIRTAZAPINE 7.5 MILLIGRAM(S): 45 TABLET, ORALLY DISINTEGRATING ORAL at 12:24

## 2024-03-27 RX ADMIN — MONTELUKAST 10 MILLIGRAM(S): 4 TABLET, CHEWABLE ORAL at 12:24

## 2024-03-27 RX ADMIN — Medication 1334 MILLIGRAM(S): at 18:18

## 2024-03-27 RX ADMIN — ATORVASTATIN CALCIUM 80 MILLIGRAM(S): 80 TABLET, FILM COATED ORAL at 21:34

## 2024-03-27 NOTE — PROGRESS NOTE ADULT - ASSESSMENT
RACHEL MARRUFO is a 87 yo woman with h/o htn, DVT/PE in 2018 and in 2019, HFrEF s/p aicd and ckd V who p/w AMS and abdominal pain after being discharged from osh ([facility]) on 3/11 to complete macrobid abx course and now p/w ams and confusion with OMERO on CKD5    #Metabolic encephalopathy, improved  #OMERO on CKD stage V--2/2 dehydration and poor oral intake  #Resolved Cystitis  #Diarrhea---Enteritis  #HFrEF -- s/p Aicd  #h/o DVT/PE  - tele monitoring  - continue to monitor bmp closely  - nephrology following---no acute indication for RRT at this time   - hold nephrotoxic meds  - closely monitor vs-----can hold additional bp meds if becomes hypotensive (both entresto and lasix on hold---restart as cr and bp tolerates)  - continue ivf 50cc/hr with hc03 -> DC fluids  - lokelma 10g q24h for elevated potassium  - check stool study pending collection  - eps interrogated device   - continue eliquis 2.5 mg bid  - f/u ua/ucr/ulytes -> wnl   - primafit to monitor and document I/o  - palliative care consulted for GOC  - GOC conversation had with healthcare proxy- okay to start dialysis if pt needs  - repeat UA/UCx for new onset fever and LLQ & suprapubic tenderness    #suspected pneumonia  #perihilar opacities  - CXR showing perihilar opacities in setting of overnight fever and tachycardia  - renally dose Levofloxacin 500mg q48h   - trend WBC and fever   - rpt bcx f/u     # HTN  amLODIPine   Tablet 5 milliGRAM(s) Oral at bedtime  isosorbide   mononitrate ER Tablet (IMDUR) 30 milliGRAM(s) Oral daily  metoprolol succinate ER 50 milliGRAM(s) Oral daily    # Hx of dvt/pe  - on apixaban 2.5 po bid    # HLD  atorvastatin 80 milliGRAM(s) Oral at bedtime     # Depression   - starting mirtazapine 7.5 milliGRAM(s) Oral daily for mood and appetite stimulation  - sertraline 25 milliGRAM(s) Oral daily         DVT/GI ppx  FULL CODE  dispo: tele  HCP: Leona Marrufo - [phone]  pend: TDC placement? must talk to family

## 2024-03-27 NOTE — PROGRESS NOTE ADULT - SUBJECTIVE AND OBJECTIVE BOX
24H events:    Patient is a 88y old Female who presents with a chief complaint of AMS (26 Mar 2024 09:36)    Primary diagnosis of OMERO (acute kidney injury)    Day 1:    Today is 5d of hospitalization. This morning patient was seen and examined at bedside, resting comfortably in bed.    No acute or major events overnight.    Code Status: Full Code    PAST MEDICAL & SURGICAL HISTORY  Chronic kidney disease    Hypertension    Gout    Diverticulitis  sigmoid    Rheumatoid arthritis    DVT, lower extremity  Bilateral    Pulmonary embolism    Chronic HFrEF (heart failure with reduced ejection fraction)    AICD (automatic cardioverter/defibrillator) present    Artificial pacemaker    History of appendectomy    History of tonsillectomy    History of hysterectomy    H/O hernia repair    SOCIAL HISTORY:  Social History:    ALLERGIES:  Keflex (Swelling)  cephalosporins (Angioedema)    MEDICATIONS:  STANDING MEDICATIONS  amLODIPine   Tablet 5 milliGRAM(s) Oral at bedtime  apixaban 2.5 milliGRAM(s) Oral every 12 hours  atorvastatin 80 milliGRAM(s) Oral at bedtime  calcium acetate 1334 milliGRAM(s) Oral every 12 hours  folic acid 1 milliGRAM(s) Oral daily  isosorbide   mononitrate ER Tablet (IMDUR) 30 milliGRAM(s) Oral daily  metoprolol succinate ER 50 milliGRAM(s) Oral daily  mirtazapine 7.5 milliGRAM(s) Oral daily  montelukast 10 milliGRAM(s) Oral daily  sertraline 25 milliGRAM(s) Oral daily  simethicone 80 milliGRAM(s) Chew daily  sodium bicarbonate 650 milliGRAM(s) Oral every 12 hours    PRN MEDICATIONS  acetaminophen     Tablet .. 650 milliGRAM(s) Oral every 6 hours PRN  albuterol    90 MICROgram(s) HFA Inhaler 2 Puff(s) Inhalation every 6 hours PRN    VITALS:   T(F): 98.2  HR: 71  BP: 150/68  RR: 18  SpO2: --    PHYSICAL EXAM:  GENERAL:   ( x) NAD, lying in bed comfortably     (  ) obtunded     (  ) lethargic     (  ) somnolent    HEAD:   ( x) Atraumatic     (  ) hematoma     (  ) laceration (specify location:       )     NECK:  (x) Supple     (  ) neck stiffness     (  ) nuchal rigidity     (  )  no JVD     (  ) JVD present ( -- cm)    HEART:  Rate -->     (x) normal rate     (  ) bradycardic     (  ) tachycardic  Rhythm -->     (x) regular     (  ) regularly irregular     (  ) irregularly irregular  Murmurs -->     (x) normal s1s2     (  ) systolic murmur     (  ) diastolic murmur     (  ) continuous murmur      (  ) S3 present     (  ) S4 present    LUNGS:   ( x)Unlabored respirations     (  ) tachypnea  ( x) B/L air entry     (  ) decreased breath sounds in:  (location     )    ( x) no adventitious sound     (  ) crackles     (  ) wheezing      (  ) rhonchi      (specify location:       )  (  ) chest wall tenderness (specify location:       )    ABDOMEN:   ( x) Soft     (  ) tense   |   (  ) nondistended     (  ) distended   |   (  ) +BS     (  ) hypoactive bowel sounds     (  ) hyperactive bowel sounds  ( x) nontender     (  ) RUQ tenderness     (  ) RLQ tenderness     (  ) LLQ tenderness     (  ) epigastric tenderness     (  ) diffuse tenderness  (  ) Splenomegaly      (  ) Hepatomegaly      (  ) Jaundice     (  ) ecchymosis     EXTREMITIES:  ( x) Normal     (  ) Rash     (  ) ecchymosis     (  ) varicose veins      (  ) pitting edema     (  ) non-pitting edema   (  ) ulceration     (  ) gangrene:     (location:     )    NERVOUS SYSTEM:    ( x) A&Ox3     (  ) confused     (  ) lethargic  CN II-XII:     ( x) Intact     (  ) deficits found     (Specify:     )   Upper extremities:     (  ) no sensorimotor deficits     (  ) weakness     (  ) loss of proprioception/vibration     (  ) loss of touch/temperature (specify:    )  Lower extremities:     (  ) no sensorimotor deficits     (  ) weakness     (  ) loss of proprioception/vibration     (  ) loss of touch/temperature (specify:    )    SKIN:   (  ) No rashes or lesions     (  ) maculopapular rash     (  ) pustules     (  ) vesicles     (  ) ulcer     (  ) ecchymosis     (specify location:     )    LABS:                        8.8    9.34  )-----------( 222      ( 27 Mar 2024 06:19 )             28.0     03-27    135  |  102  |  86<HH>  ----------------------------<  76  4.8   |  22  |  5.6<HH>    Ca    7.9<L>      27 Mar 2024 06:19  Phos  3.9     03-26  Mg     1.9     03-27    Urinalysis Basic - ( 27 Mar 2024 06:19 )    Color: x / Appearance: x / SG: x / pH: x  Gluc: 76 mg/dL / Ketone: x  / Bili: x / Urobili: x   Blood: x / Protein: x / Nitrite: x   Leuk Esterase: x / RBC: x / WBC x   Sq Epi: x / Non Sq Epi: x / Bacteria: x    ABG - ( 26 Mar 2024 18:16 )  pH, Arterial: 7.47  pH, Blood: x     /  pCO2: 29    /  pO2: 70    / HCO3: 21    / Base Excess: -1.5  /  SaO2: 96.3      RADIOLOGY:    ASSESSMENT AND PLAN  RACHEL SUMMERS is a [id number] Female with a history significant for    -------------------------------------------------------------------------------------------------------------------------------------  #DVT PPX:    #GI PPX:    #Diet    #Code Status:    #Activity Order    #Dispo/Needs    #Handoff             24H events:    Patient is a 88y old Female who presents with a chief complaint of AMS (26 Mar 2024 09:36)    Primary diagnosis of OMERO (acute kidney injury)    Today is 5d of hospitalization. Patient reports LLQ and suprapubic tenderness prior to urination. Pt was febrile overnight and seemingly more lethargic as compared to yesterday. This morning patient was seen and examined at bedside, resting comfortably in bed. Hemodynamically stable, tolerating oral diet, voiding appropriately with appropriate bowel movements.   No acute or major events overnight.    Code Status: Full Code    PAST MEDICAL & SURGICAL HISTORY  Chronic kidney disease    Hypertension    Gout    Diverticulitis  sigmoid    Rheumatoid arthritis    DVT, lower extremity  Bilateral    Pulmonary embolism    Chronic HFrEF (heart failure with reduced ejection fraction)    AICD (automatic cardioverter/defibrillator) present    Artificial pacemaker    History of appendectomy    History of tonsillectomy    History of hysterectomy    H/O hernia repair    SOCIAL HISTORY:  Social History:    ALLERGIES:  Keflex (Swelling)  cephalosporins (Angioedema)    MEDICATIONS:  STANDING MEDICATIONS  amLODIPine   Tablet 5 milliGRAM(s) Oral at bedtime  apixaban 2.5 milliGRAM(s) Oral every 12 hours  atorvastatin 80 milliGRAM(s) Oral at bedtime  calcium acetate 1334 milliGRAM(s) Oral every 12 hours  folic acid 1 milliGRAM(s) Oral daily  isosorbide   mononitrate ER Tablet (IMDUR) 30 milliGRAM(s) Oral daily  metoprolol succinate ER 50 milliGRAM(s) Oral daily  mirtazapine 7.5 milliGRAM(s) Oral daily  montelukast 10 milliGRAM(s) Oral daily  sertraline 25 milliGRAM(s) Oral daily  simethicone 80 milliGRAM(s) Chew daily  sodium bicarbonate 650 milliGRAM(s) Oral every 12 hours    PRN MEDICATIONS  acetaminophen     Tablet .. 650 milliGRAM(s) Oral every 6 hours PRN  albuterol    90 MICROgram(s) HFA Inhaler 2 Puff(s) Inhalation every 6 hours PRN    VITALS:   T(F): 98.2  HR: 71  BP: 150/68  RR: 18  SpO2: --    PHYSICAL EXAM:  GENERAL: Depressed and lethargic affect noted  ( x) NAD, lying in bed comfortably     (  ) obtunded     (  ) lethargic     (  ) somnolent    HEAD:   ( x) Atraumatic     (  ) hematoma     (  ) laceration (specify location:       )     NECK:  (x) Supple     (  ) neck stiffness     (  ) nuchal rigidity     (  )  no JVD     (  ) JVD present ( -- cm)    HEART:  Rate -->     (x) normal rate     (  ) bradycardic     (  ) tachycardic  Rhythm -->     (x) regular     (  ) regularly irregular     (  ) irregularly irregular  Murmurs -->     (x) normal s1s2     (  ) systolic murmur     (  ) diastolic murmur     (  ) continuous murmur      (  ) S3 present     (  ) S4 present    LUNGS:   ( x)Unlabored respirations     (  ) tachypnea  ( x) B/L air entry     (  ) decreased breath sounds in:  (location     )    ( x) no adventitious sound     (  ) crackles     (  ) wheezing      (  ) rhonchi      (specify location:       )  (  ) chest wall tenderness (specify location:       )    ABDOMEN: no tenderness noted on palpation, bowel sounds normal  ( x) Soft     (  ) tense   |   ( x ) nondistended     (  ) distended   |   (  ) +BS     (  ) hypoactive bowel sounds     (  ) hyperactive bowel sounds  ( x) nontender     (  ) RUQ tenderness     (  ) RLQ tenderness     (  ) LLQ tenderness     (  ) epigastric tenderness     (  ) diffuse tenderness  (  ) Splenomegaly      (  ) Hepatomegaly      (  ) Jaundice     (  ) ecchymosis     EXTREMITIES:  ( x) Normal     (  ) Rash     (  ) ecchymosis     (  ) varicose veins      (  ) pitting edema     (  ) non-pitting edema   (  ) ulceration     (  ) gangrene:     (location:     )    NERVOUS SYSTEM:    ( x) A&Ox3     (  ) confused     (  ) lethargic  CN II-XII:     ( x) Intact     (  ) deficits found     (Specify:     )   Upper extremities:     (  ) no sensorimotor deficits     (  ) weakness     (  ) loss of proprioception/vibration     (  ) loss of touch/temperature (specify:    )  Lower extremities:     (  ) no sensorimotor deficits     (  ) weakness     (  ) loss of proprioception/vibration     (  ) loss of touch/temperature (specify:    )    SKIN:   (  ) No rashes or lesions     (  ) maculopapular rash     (  ) pustules     (  ) vesicles     (  ) ulcer     (  ) ecchymosis     (specify location:     )    LABS:                        8.8    9.34  )-----------( 222      ( 27 Mar 2024 06:19 )             28.0     03-27    135  |  102  |  86<HH>  ----------------------------<  76  4.8   |  22  |  5.6<HH>    Ca    7.9<L>      27 Mar 2024 06:19  Phos  3.9     03-26  Mg     1.9     03-27    Urinalysis Basic - ( 27 Mar 2024 06:19 )    Color: x / Appearance: x / SG: x / pH: x  Gluc: 76 mg/dL / Ketone: x  / Bili: x / Urobili: x   Blood: x / Protein: x / Nitrite: x   Leuk Esterase: x / RBC: x / WBC x   Sq Epi: x / Non Sq Epi: x / Bacteria: x    ABG - ( 26 Mar 2024 18:16 )  pH, Arterial: 7.47  pH, Blood: x     /  pCO2: 29    /  pO2: 70    / HCO3: 21    / Base Excess: -1.5  /  SaO2: 96.3      RADIOLOGY:  < from: Xray Chest 1 View- PORTABLE-Routine (Xray Chest 1 View- PORTABLE-Routine .) (03.27.24 @ 09:17) >  IMPRESSION:    Stable perihilar opacities.    Stable cardiomegaly.    --- End of Report ---    < end of copied text >    ASSESSMENT AND PLAN  RACHEL SUMMERS is a 87 yo woman with h/o htn, DVT/PE in 2018 and in 2019, HFrEF s/p aicd and ckd V who p/w AMS and abdominal pain after being discharged from osh ([facility]) on 3/11 to complete macrobid abx course and now p/w ams and confusion with OMERO on CKD5    #Metabolic encephalopathy, improved  #OMERO on ckd stage V--2/2 dehydration and poor oral intake  #Resolved Cystitis  #Diarrhea---Enteritis  #HFrEF--s/p Aicd  #h/o DVT/PE  -tele monitoring  -continue to monitor bmp closely  -nephrology following---no acute indication for RRT at this time   -hold nephrotoxic meds  -closely monitor vs-----can hold additional bp meds if becomes hypotensive (both entresto and lasix on hold---restart as cr and bp tolerates)  -continue ivf 50cc/hr with hc03   - lokelma 10g q24h for elevated potassium   bcx and ucx- neg  -check stool study pending collection  -eps interrogated device   -continue eliquis 2.5 mg bid  -f/u ua/ucr/ulytes -> wnl   -primafit to monitor and document I/o  - palliative care consulted for GOC  - repeat UA/UCx for new onset fever    DVT/GI ppx  FULL CODE--continue to readdress goc  dispo: tele  pend: GOC with palliative 24H events:    Patient is a 88y old Female who presents with a chief complaint of AMS (26 Mar 2024 09:36)    Primary diagnosis of OMERO (acute kidney injury)    Today is 5d of hospitalization. Patient reports LLQ and suprapubic tenderness prior to urination. Pt was febrile overnight and seemingly more lethargic as compared to yesterday. This morning patient was seen and examined at bedside, resting comfortably in bed. Hemodynamically stable, tolerating oral diet, voiding appropriately with appropriate bowel movements.   No acute or major events overnight.    Code Status: Full Code    PAST MEDICAL & SURGICAL HISTORY  Chronic kidney disease    Hypertension    Gout    Diverticulitis  sigmoid    Rheumatoid arthritis    DVT, lower extremity  Bilateral    Pulmonary embolism    Chronic HFrEF (heart failure with reduced ejection fraction)    AICD (automatic cardioverter/defibrillator) present    Artificial pacemaker    History of appendectomy    History of tonsillectomy    History of hysterectomy    H/O hernia repair    SOCIAL HISTORY:  Social History:    ALLERGIES:  Keflex (Swelling)  cephalosporins (Angioedema)    MEDICATIONS:  STANDING MEDICATIONS  amLODIPine   Tablet 5 milliGRAM(s) Oral at bedtime  apixaban 2.5 milliGRAM(s) Oral every 12 hours  atorvastatin 80 milliGRAM(s) Oral at bedtime  calcium acetate 1334 milliGRAM(s) Oral every 12 hours  folic acid 1 milliGRAM(s) Oral daily  isosorbide   mononitrate ER Tablet (IMDUR) 30 milliGRAM(s) Oral daily  metoprolol succinate ER 50 milliGRAM(s) Oral daily  mirtazapine 7.5 milliGRAM(s) Oral daily  montelukast 10 milliGRAM(s) Oral daily  sertraline 25 milliGRAM(s) Oral daily  simethicone 80 milliGRAM(s) Chew daily  sodium bicarbonate 650 milliGRAM(s) Oral every 12 hours    PRN MEDICATIONS  acetaminophen     Tablet .. 650 milliGRAM(s) Oral every 6 hours PRN  albuterol    90 MICROgram(s) HFA Inhaler 2 Puff(s) Inhalation every 6 hours PRN    VITALS:   T(F): 98.2  HR: 71  BP: 150/68  RR: 18  SpO2: --    PHYSICAL EXAM:  GENERAL: Depressed and lethargic affect noted  ( x) NAD, lying in bed comfortably     (  ) obtunded     (  ) lethargic     (  ) somnolent    HEAD:   ( x) Atraumatic     (  ) hematoma     (  ) laceration (specify location:       )     NECK:  (x) Supple     (  ) neck stiffness     (  ) nuchal rigidity     (  )  no JVD     (  ) JVD present ( -- cm)    HEART:  Rate -->     (x) normal rate     (  ) bradycardic     (  ) tachycardic  Rhythm -->     (x) regular     (  ) regularly irregular     (  ) irregularly irregular  Murmurs -->     (x) normal s1s2     (  ) systolic murmur     (  ) diastolic murmur     (  ) continuous murmur      (  ) S3 present     (  ) S4 present    LUNGS:   ( x)Unlabored respirations     (  ) tachypnea  ( x) B/L air entry     (  ) decreased breath sounds in:  (location     )    ( x) no adventitious sound     (  ) crackles     (  ) wheezing      (  ) rhonchi      (specify location:       )  (  ) chest wall tenderness (specify location:       )    ABDOMEN: no tenderness noted on palpation, bowel sounds normal  ( x) Soft     (  ) tense   |   ( x ) nondistended     (  ) distended   |   (  ) +BS     (  ) hypoactive bowel sounds     (  ) hyperactive bowel sounds  ( x) nontender     (  ) RUQ tenderness     (  ) RLQ tenderness     (  ) LLQ tenderness     (  ) epigastric tenderness     (  ) diffuse tenderness  (  ) Splenomegaly      (  ) Hepatomegaly      (  ) Jaundice     (  ) ecchymosis     EXTREMITIES:  ( x) Normal     (  ) Rash     (  ) ecchymosis     (  ) varicose veins      (  ) pitting edema     (  ) non-pitting edema   (  ) ulceration     (  ) gangrene:     (location:     )    NERVOUS SYSTEM:    ( x) A&Ox3     (  ) confused     (  ) lethargic  CN II-XII:     ( x) Intact     (  ) deficits found     (Specify:     )   Upper extremities:     (  ) no sensorimotor deficits     (  ) weakness     (  ) loss of proprioception/vibration     (  ) loss of touch/temperature (specify:    )  Lower extremities:     (  ) no sensorimotor deficits     (  ) weakness     (  ) loss of proprioception/vibration     (  ) loss of touch/temperature (specify:    )    SKIN:   (  ) No rashes or lesions     (  ) maculopapular rash     (  ) pustules     (  ) vesicles     (  ) ulcer     (  ) ecchymosis     (specify location:     )    LABS:                        8.8    9.34  )-----------( 222      ( 27 Mar 2024 06:19 )             28.0     03-27    135  |  102  |  86<HH>  ----------------------------<  76  4.8   |  22  |  5.6<HH>    Ca    7.9<L>      27 Mar 2024 06:19  Phos  3.9     03-26  Mg     1.9     03-27    Urinalysis Basic - ( 27 Mar 2024 06:19 )    Color: x / Appearance: x / SG: x / pH: x  Gluc: 76 mg/dL / Ketone: x  / Bili: x / Urobili: x   Blood: x / Protein: x / Nitrite: x   Leuk Esterase: x / RBC: x / WBC x   Sq Epi: x / Non Sq Epi: x / Bacteria: x    ABG - ( 26 Mar 2024 18:16 )  pH, Arterial: 7.47  pH, Blood: x     /  pCO2: 29    /  pO2: 70    / HCO3: 21    / Base Excess: -1.5  /  SaO2: 96.3      RADIOLOGY:  < from: Xray Chest 1 View- PORTABLE-Routine (Xray Chest 1 View- PORTABLE-Routine .) (03.27.24 @ 09:17) >  IMPRESSION:    Stable perihilar opacities.    Stable cardiomegaly.    --- End of Report ---    < end of copied text >    ASSESSMENT AND PLAN  RACHEL SUMMERS is a 87 yo woman with h/o htn, DVT/PE in 2018 and in 2019, HFrEF s/p aicd and ckd V who p/w AMS and abdominal pain after being discharged from osh ([facility]) on 3/11 to complete macrobid abx course and now p/w ams and confusion with OMERO on CKD5    #Metabolic encephalopathy, improved  #OMERO on CKD stage V--2/2 dehydration and poor oral intake  #Resolved Cystitis  #Diarrhea---Enteritis  #HFrEF -- s/p Aicd  #h/o DVT/PE  - tele monitoring  - continue to monitor bmp closely  - nephrology following---no acute indication for RRT at this time   - hold nephrotoxic meds  - closely monitor vs-----can hold additional bp meds if becomes hypotensive (both entresto and lasix on hold---restart as cr and bp tolerates)  - continue ivf 50cc/hr with hc03   - lokelma 10g q24h for elevated potassium  - check stool study pending collection  - eps interrogated device   - continue eliquis 2.5 mg bid  - f/u ua/ucr/ulytes -> wnl   - primafit to monitor and document I/o  - palliative care consulted for GOC  - GOC conversation had with healthcare proxy- okay to start dialysis if pt needs  - repeat UA/UCx for new onset fever and LLQ & suprapubic tenderness    DVT/GI ppx  FULL CODE  dispo: tele  pend: GOC with palliative 24H events:    Patient is a 88y old Female who presents with a chief complaint of AMS (26 Mar 2024 09:36)    Primary diagnosis of OMERO (acute kidney injury)    Today is 5d of hospitalization. Patient reports LLQ and suprapubic tenderness prior to urination. Pt was febrile overnight and seemingly more lethargic as compared to yesterday. This morning patient was seen and examined at bedside, resting comfortably in bed. Hemodynamically stable, tolerating oral diet, voiding appropriately with appropriate bowel movements.   No acute or major events overnight.    Code Status: Full Code    PAST MEDICAL & SURGICAL HISTORY  Chronic kidney disease    Hypertension    Gout    Diverticulitis  sigmoid    Rheumatoid arthritis    DVT, lower extremity  Bilateral    Pulmonary embolism    Chronic HFrEF (heart failure with reduced ejection fraction)    AICD (automatic cardioverter/defibrillator) present    Artificial pacemaker    History of appendectomy    History of tonsillectomy    History of hysterectomy    H/O hernia repair    SOCIAL HISTORY:  Social History:    ALLERGIES:  Keflex (Swelling)  cephalosporins (Angioedema)    MEDICATIONS:  STANDING MEDICATIONS  amLODIPine   Tablet 5 milliGRAM(s) Oral at bedtime  apixaban 2.5 milliGRAM(s) Oral every 12 hours  atorvastatin 80 milliGRAM(s) Oral at bedtime  calcium acetate 1334 milliGRAM(s) Oral every 12 hours  folic acid 1 milliGRAM(s) Oral daily  isosorbide   mononitrate ER Tablet (IMDUR) 30 milliGRAM(s) Oral daily  metoprolol succinate ER 50 milliGRAM(s) Oral daily  mirtazapine 7.5 milliGRAM(s) Oral daily  montelukast 10 milliGRAM(s) Oral daily  sertraline 25 milliGRAM(s) Oral daily  simethicone 80 milliGRAM(s) Chew daily  sodium bicarbonate 650 milliGRAM(s) Oral every 12 hours    PRN MEDICATIONS  acetaminophen     Tablet .. 650 milliGRAM(s) Oral every 6 hours PRN  albuterol    90 MICROgram(s) HFA Inhaler 2 Puff(s) Inhalation every 6 hours PRN    VITALS:   T(F): 98.2  HR: 71  BP: 150/68  RR: 18  SpO2: --    PHYSICAL EXAM:  GENERAL: Depressed affect noted worsened from yesterday  ( x) NAD, lying in bed comfortably     (  ) obtunded     (  ) lethargic     (  ) somnolent    HEAD:   ( x) Atraumatic     (  ) hematoma     (  ) laceration (specify location:       )     NECK:  (x) Supple     (  ) neck stiffness     (  ) nuchal rigidity     (  )  no JVD     (  ) JVD present ( -- cm)    HEART:  Rate -->     (x) normal rate     (  ) bradycardic     (  ) tachycardic  Rhythm -->     (x) regular     (  ) regularly irregular     (  ) irregularly irregular  Murmurs -->     (x) normal s1s2     (  ) systolic murmur     (  ) diastolic murmur     (  ) continuous murmur      (  ) S3 present     (  ) S4 present    LUNGS:   ( x)Unlabored respirations     (  ) tachypnea  ( x) B/L air entry     (  ) decreased breath sounds in:  (location     )    ( x) no adventitious sound     (  ) crackles     (  ) wheezing      (  ) rhonchi      (specify location:       )  (  ) chest wall tenderness (specify location:       )    ABDOMEN: no tenderness noted on palpation, bowel sounds normal  ( x) Soft     (  ) tense   |   ( x ) nondistended     (  ) distended   |   (  ) +BS     (  ) hypoactive bowel sounds     (  ) hyperactive bowel sounds  ( x) nontender     (  ) RUQ tenderness     (  ) RLQ tenderness     (  ) LLQ tenderness     (  ) epigastric tenderness     (  ) diffuse tenderness  (  ) Splenomegaly      (  ) Hepatomegaly      (  ) Jaundice     (  ) ecchymosis     EXTREMITIES:  ( x) Normal     (  ) Rash     (  ) ecchymosis     (  ) varicose veins      (  ) pitting edema     (  ) non-pitting edema   (  ) ulceration     (  ) gangrene:     (location:     )    NERVOUS SYSTEM:    ( x) A&Ox3     (  ) confused     (  ) lethargic  CN II-XII:     ( x) Intact     (  ) deficits found     (Specify:     )   Upper extremities:     (  ) no sensorimotor deficits     (  ) weakness     (  ) loss of proprioception/vibration     (  ) loss of touch/temperature (specify:    )  Lower extremities:     (  ) no sensorimotor deficits     (  ) weakness     (  ) loss of proprioception/vibration     (  ) loss of touch/temperature (specify:    )    SKIN:   (  ) No rashes or lesions     (  ) maculopapular rash     (  ) pustules     (  ) vesicles     (  ) ulcer     (  ) ecchymosis     (specify location:     )    LABS:                        8.8    9.34  )-----------( 222      ( 27 Mar 2024 06:19 )             28.0     03-27    135  |  102  |  86<HH>  ----------------------------<  76  4.8   |  22  |  5.6<HH>    Ca    7.9<L>      27 Mar 2024 06:19  Phos  3.9     03-26  Mg     1.9     03-27    Urinalysis Basic - ( 27 Mar 2024 06:19 )    Color: x / Appearance: x / SG: x / pH: x  Gluc: 76 mg/dL / Ketone: x  / Bili: x / Urobili: x   Blood: x / Protein: x / Nitrite: x   Leuk Esterase: x / RBC: x / WBC x   Sq Epi: x / Non Sq Epi: x / Bacteria: x    ABG - ( 26 Mar 2024 18:16 )  pH, Arterial: 7.47  pH, Blood: x     /  pCO2: 29    /  pO2: 70    / HCO3: 21    / Base Excess: -1.5  /  SaO2: 96.3      RADIOLOGY:  < from: Xray Chest 1 View- PORTABLE-Routine (Xray Chest 1 View- PORTABLE-Routine .) (03.27.24 @ 09:17) >  IMPRESSION:    Stable perihilar opacities.    Stable cardiomegaly.    --- End of Report ---    < end of copied text >    ASSESSMENT AND PLAN  RACHEL SUMMERS is a 89 yo woman with h/o htn, DVT/PE in 2018 and in 2019, HFrEF s/p aicd and ckd V who p/w AMS and abdominal pain after being discharged from osh ([facility]) on 3/11 to complete macrobid abx course and now p/w ams and confusion with OMERO on CKD5    #Metabolic encephalopathy, improved  #OMERO on CKD stage V--2/2 dehydration and poor oral intake  #Resolved Cystitis  #Diarrhea---Enteritis  #HFrEF -- s/p Aicd  #h/o DVT/PE  - tele monitoring  - continue to monitor bmp closely  - nephrology following---no acute indication for RRT at this time   - hold nephrotoxic meds  - closely monitor vs-----can hold additional bp meds if becomes hypotensive (both entresto and lasix on hold---restart as cr and bp tolerates)  - continue ivf 50cc/hr with hc03   - lokelma 10g q24h for elevated potassium  - check stool study pending collection  - eps interrogated device   - continue eliquis 2.5 mg bid  - f/u ua/ucr/ulytes -> wnl   - primafit to monitor and document I/o  - palliative care consulted for GOC  - GOC conversation had with healthcare proxy- okay to start dialysis if pt needs  - repeat UA/UCx for new onset fever and LLQ & suprapubic tenderness    DVT/GI ppx  FULL CODE  dispo: tele  pend: GOC with palliative 24H events:    Patient is a 88y old Female who presents with a chief complaint of AMS (26 Mar 2024 09:36)    Primary diagnosis of OMERO (acute kidney injury)    Today is 5d of hospitalization. Patient reports LLQ and suprapubic tenderness prior to urination. Pt was febrile overnight and seemingly more lethargic as compared to yesterday. This morning patient was seen and examined at bedside, resting comfortably in bed. Hemodynamically stable, tolerating oral diet, voiding appropriately with appropriate bowel movements.       Code Status: Full Code    PAST MEDICAL & SURGICAL HISTORY  Chronic kidney disease    Hypertension    Gout    Diverticulitis  sigmoid    Rheumatoid arthritis    DVT, lower extremity  Bilateral    Pulmonary embolism    Chronic HFrEF (heart failure with reduced ejection fraction)    AICD (automatic cardioverter/defibrillator) present    Artificial pacemaker    History of appendectomy    History of tonsillectomy    History of hysterectomy    H/O hernia repair    SOCIAL HISTORY:  Social History:    ALLERGIES:  Keflex (Swelling)  cephalosporins (Angioedema)    MEDICATIONS:  STANDING MEDICATIONS  amLODIPine   Tablet 5 milliGRAM(s) Oral at bedtime  apixaban 2.5 milliGRAM(s) Oral every 12 hours  atorvastatin 80 milliGRAM(s) Oral at bedtime  calcium acetate 1334 milliGRAM(s) Oral every 12 hours  folic acid 1 milliGRAM(s) Oral daily  isosorbide   mononitrate ER Tablet (IMDUR) 30 milliGRAM(s) Oral daily  metoprolol succinate ER 50 milliGRAM(s) Oral daily  mirtazapine 7.5 milliGRAM(s) Oral daily  montelukast 10 milliGRAM(s) Oral daily  sertraline 25 milliGRAM(s) Oral daily  simethicone 80 milliGRAM(s) Chew daily  sodium bicarbonate 650 milliGRAM(s) Oral every 12 hours    PRN MEDICATIONS  acetaminophen     Tablet .. 650 milliGRAM(s) Oral every 6 hours PRN  albuterol    90 MICROgram(s) HFA Inhaler 2 Puff(s) Inhalation every 6 hours PRN    VITALS:   T(F): 98.2  HR: 71  BP: 150/68  RR: 18  SpO2: --    PHYSICAL EXAM:  GENERAL: Depressed affect noted worsened from yesterday  ( x) NAD, lying in bed comfortably     (  ) obtunded     (  ) lethargic     (  ) somnolent    HEAD:   ( x) Atraumatic     (  ) hematoma     (  ) laceration (specify location:       )     NECK:  (x) Supple     (  ) neck stiffness     (  ) nuchal rigidity     (  )  no JVD     (  ) JVD present ( -- cm)    HEART:  Rate -->     (x) normal rate     (  ) bradycardic     (  ) tachycardic  Rhythm -->     (x) regular     (  ) regularly irregular     (  ) irregularly irregular  Murmurs -->     (x) normal s1s2     (  ) systolic murmur     (  ) diastolic murmur     (  ) continuous murmur      (  ) S3 present     (  ) S4 present    LUNGS:   ( x)Unlabored respirations     (  ) tachypnea  ( x) B/L air entry     (  ) decreased breath sounds in:  (location     )    ( x) no adventitious sound     (  ) crackles     (  ) wheezing      (  ) rhonchi      (specify location:       )  (  ) chest wall tenderness (specify location:       )    ABDOMEN: no tenderness noted on palpation, bowel sounds normal  ( x) Soft     (  ) tense   |   ( x ) nondistended     (  ) distended   |   (  ) +BS     (  ) hypoactive bowel sounds     (  ) hyperactive bowel sounds  ( x) nontender     (  ) RUQ tenderness     (  ) RLQ tenderness     (  ) LLQ tenderness     (  ) epigastric tenderness     (  ) diffuse tenderness  (  ) Splenomegaly      (  ) Hepatomegaly      (  ) Jaundice     (  ) ecchymosis     EXTREMITIES:  ( x) Normal     (  ) Rash     (  ) ecchymosis     (  ) varicose veins      (  ) pitting edema     (  ) non-pitting edema   (  ) ulceration     (  ) gangrene:     (location:     )    NERVOUS SYSTEM:    ( x) A&Ox3     (  ) confused     (  ) lethargic  CN II-XII:     ( x) Intact     (  ) deficits found     (Specify:     )   Upper extremities:     (  ) no sensorimotor deficits     (  ) weakness     (  ) loss of proprioception/vibration     (  ) loss of touch/temperature (specify:    )  Lower extremities:     (  ) no sensorimotor deficits     (  ) weakness     (  ) loss of proprioception/vibration     (  ) loss of touch/temperature (specify:    )    SKIN:   (  ) No rashes or lesions     (  ) maculopapular rash     (  ) pustules     (  ) vesicles     (  ) ulcer     (  ) ecchymosis     (specify location:     )    LABS:                        8.8    9.34  )-----------( 222      ( 27 Mar 2024 06:19 )             28.0     03-27    135  |  102  |  86<HH>  ----------------------------<  76  4.8   |  22  |  5.6<HH>    Ca    7.9<L>      27 Mar 2024 06:19  Phos  3.9     03-26  Mg     1.9     03-27    Urinalysis Basic - ( 27 Mar 2024 06:19 )    Color: x / Appearance: x / SG: x / pH: x  Gluc: 76 mg/dL / Ketone: x  / Bili: x / Urobili: x   Blood: x / Protein: x / Nitrite: x   Leuk Esterase: x / RBC: x / WBC x   Sq Epi: x / Non Sq Epi: x / Bacteria: x    ABG - ( 26 Mar 2024 18:16 )  pH, Arterial: 7.47  pH, Blood: x     /  pCO2: 29    /  pO2: 70    / HCO3: 21    / Base Excess: -1.5  /  SaO2: 96.3      RADIOLOGY:  < from: Xray Chest 1 View- PORTABLE-Routine (Xray Chest 1 View- PORTABLE-Routine .) (03.27.24 @ 09:17) >  IMPRESSION:    Stable perihilar opacities.    Stable cardiomegaly.    --- End of Report ---    < end of copied text >

## 2024-03-27 NOTE — GOALS OF CARE CONVERSATION - ADVANCED CARE PLANNING - CONVERSATION DETAILS
Called and spoke to Leona Marrufo (HCP) at her number [phone] (wrong # in patient info). discussed the patient's declining kidney function and possibility of requiring dialysis. Confirmed that the patient has never been in hospice. HCP wants the patient to continue with medical management and are agreeable to dialysis if the patient requires it. Patient is currently overwhelmed and has depressed affect and does not want to make any decisions at the moment when asked. Please continue to update the HCP Leona for any aggressive medical management.

## 2024-03-27 NOTE — PROGRESS NOTE ADULT - ATTENDING COMMENTS
***My note supersedes any discrepancies that may be above in the resident's note***    89 yo woman with h/o htn, DVT/PE in 2018 and in 2019, HFrEF s/p aicd and ckd V who p/w AMS and abdominal pain after being discharged from osh ([facility]) on 3/11 to complete macrobid abx course and now p/w ams and confusion with OMERO on CKD5    Gen- elderly, frail appearing F, NAD, non toxic appearing  Eyes- anicteric sclera, non injected conjunctiva, EOMI  Chest- symmetrical chest rise, fair respiratory effort  Abdominal- non distended  Neuro- AOx2-3  Psych- flat affect    #Metabolic encephalopathy, wax/wanes  #OMERO on ckd stage V--2/2 dehydration and poor oral intake  #Resolved Cystitis  #Diarrhea---Enteritis  #HFrEF--s/p Aicd  #h/o DVT/PE  - urine cx: 3/21 E.coli(pan sensitive); repeat 3/22 negative?  - tele monitoring  - continue to monitor bmp closely  - nephrology following               no acute indication for RRT now but will need TDC and to start RRT soon / will discuss with patient   -f/u ua/ucr/ulytes -> wnl   -primafit to monitor and document I/o  - palliative care consulted for GOC      # Suspected PNA  - fever 101.4F overnight 3/26  - no leukocytosis  - Xray Chest 1 View- PORTABLE-Routine (Xray Chest 1 View- PORTABLE-Routine .) (03.27.24 @ 09:17) >Stable perihilar opacities.Stable cardiomegaly  - procal pending  - RVP  - continue with renally dosed levaquin    # HTN  amLODIPine   Tablet 5 milliGRAM(s) Oral at bedtime  isosorbide   mononitrate ER Tablet (IMDUR) 30 milliGRAM(s) Oral daily  metoprolol succinate ER 50 milliGRAM(s) Oral daily    # Hx of dvt/pe  - on apixaban 2.5 milliGRAM(s) Oral every 12 hours; however dose reduction not appropriate for goal directed therapy. possible off label dosing after discussing bleeding risk with family? patient unsure. please confirm when family comes in    # HLD  atorvastatin 80 milliGRAM(s) Oral at bedtime     # Depression   - starting mirtazapine 7.5 milliGRAM(s) Oral daily for mood and appetite stimulation  sertraline 25 milliGRAM(s) Oral daily       DVT/GI ppx  FULL CODE--continue to readdress goc  dispo: tele    #Progress Note Handoff  Pending (specify): serial BMP, GOC with pall care, Neph following +/- RRT  Family discussion: family updated  Disposition:  Unknown at this time________ .

## 2024-03-27 NOTE — PROGRESS NOTE ADULT - SUBJECTIVE AND OBJECTIVE BOX
Nephrology progress note    THIS IS AN INCOMPLETE NOTE . FULL NOTE TO FOLLOW SHORTLY    Patient is seen and examined, events over the last 24 h noted .    Allergies:  Keflex (Swelling)  cephalosporins (Angioedema)    Hospital Medications:   MEDICATIONS  (STANDING):  amLODIPine   Tablet 5 milliGRAM(s) Oral at bedtime  apixaban 2.5 milliGRAM(s) Oral every 12 hours  atorvastatin 80 milliGRAM(s) Oral at bedtime  calcium acetate 1334 milliGRAM(s) Oral every 12 hours  folic acid 1 milliGRAM(s) Oral daily  isosorbide   mononitrate ER Tablet (IMDUR) 30 milliGRAM(s) Oral daily  metoprolol succinate ER 50 milliGRAM(s) Oral daily  mirtazapine 7.5 milliGRAM(s) Oral daily  montelukast 10 milliGRAM(s) Oral daily  sertraline 25 milliGRAM(s) Oral daily  simethicone 80 milliGRAM(s) Chew daily  sodium bicarbonate 650 milliGRAM(s) Oral every 12 hours        VITALS:  T(F): 98.2 (03-27-24 @ 04:43), Max: 101.4 (03-26-24 @ 20:45)  HR: 71 (03-27-24 @ 04:43)  BP: 150/68 (03-27-24 @ 04:43)  RR: 18 (03-27-24 @ 04:43)  SpO2: --  Wt(kg): --    03-25 @ 07:01  -  03-26 @ 07:00  --------------------------------------------------------  IN: 519 mL / OUT: 800 mL / NET: -281 mL    03-26 @ 07:01  -  03-27 @ 07:00  --------------------------------------------------------  IN: 888 mL / OUT: 1100 mL / NET: -212 mL          PHYSICAL EXAM:  Constitutional: NAD  HEENT: anicteric sclera, oropharynx clear, MMM  Neck: No JVD  Respiratory: CTAB, no wheezes, rales or rhonchi  Cardiovascular: S1, S2, RRR  Gastrointestinal: BS+, soft, NT/ND  Extremities: No cyanosis or clubbing. No peripheral edema  :  No carlos.   Skin: No rashes    LABS:  03-27    135  |  102  |  86<HH>  ----------------------------<  76  4.8   |  22  |  5.6<HH>    Ca    7.9<L>      27 Mar 2024 06:19  Phos  3.9     03-26  Mg     1.9     03-27                            8.8    9.34  )-----------( 222      ( 27 Mar 2024 06:19 )             28.0       Urine Studies:  Urinalysis Basic - ( 27 Mar 2024 06:19 )    Color:  / Appearance:  / SG:  / pH:   Gluc: 76 mg/dL / Ketone:   / Bili:  / Urobili:    Blood:  / Protein:  / Nitrite:    Leuk Esterase:  / RBC:  / WBC    Sq Epi:  / Non Sq Epi:  / Bacteria:       Sodium, Random Urine: 36.0 mmoL/L (03-23 @ 21:23)  Creatinine, Random Urine: 45 mg/dL (03-23 @ 21:23)  Protein/Creatinine Ratio Calculation: 2.4 Ratio (03-23 @ 21:23)  Osmolality, Random Urine: 273 mos/kg (03-23 @ 21:23)  Potassium, Random Urine: 16 mmol/L (03-23 @ 21:23)  Sodium, Random Urine: 47.0 mmoL/L (03-22 @ 21:20)  Creatinine, Random Urine: 42 mg/dL (03-22 @ 21:20)  Protein/Creatinine Ratio Calculation: 1.7 Ratio (03-22 @ 21:20)  Osmolality, Random Urine: 301 mos/kg (03-22 @ 21:20)  Potassium, Random Urine: 13 mmol/L (03-22 @ 21:20)      PTH -- (Ca 8.2)      [03-24-24 @ 20:00]   138  Vitamin D (25OH) 9      [03-24-24 @ 20:00]  Lipid: chol 144, , HDL 39, LDL --      [03-22-24 @ 06:14]      OSIEL: titer 1:160, pattern DFS70      [04-27-22 @ 16:00]  Rheumatoid Factor 31      [07-18-22 @ 05:21]  Free Light Chains: kappa 13.15, lambda 10.99, ratio = 1.20      [12-21 @ 07:39]  Immunofixation Serum:   No Monoclonal Band Identified    Reference Range: None Detected      [12-20-22 @ 10:57]  SPEP Interpretation: Normal Electrophoresis Pattern      [12-20-22 @ 10:57]  Immunofixation Urine: Reference Range: None Detected      [12-25-20 @ 12:35]  UPEP Interpretation: Mild Selective (Glomerular) Proteinuria      [12-25-20 @ 12:35]      RADIOLOGY & ADDITIONAL STUDIES:   Nephrology progress note    Patient is seen and examined, events over the last 24 h noted .  Lying in bed   lethargic   Allergies:  Keflex (Swelling)  cephalosporins (Angioedema)    Hospital Medications:   MEDICATIONS  (STANDING):  amLODIPine   Tablet 5 milliGRAM(s) Oral at bedtime  apixaban 2.5 milliGRAM(s) Oral every 12 hours  atorvastatin 80 milliGRAM(s) Oral at bedtime  calcium acetate 1334 milliGRAM(s) Oral every 12 hours  folic acid 1 milliGRAM(s) Oral daily  isosorbide   mononitrate ER Tablet (IMDUR) 30 milliGRAM(s) Oral daily  metoprolol succinate ER 50 milliGRAM(s) Oral daily  mirtazapine 7.5 milliGRAM(s) Oral daily  montelukast 10 milliGRAM(s) Oral daily  sertraline 25 milliGRAM(s) Oral daily  simethicone 80 milliGRAM(s) Chew daily  sodium bicarbonate 650 milliGRAM(s) Oral every 12 hours        VITALS:  T(F): 98.2 (03-27-24 @ 04:43), Max: 101.4 (03-26-24 @ 20:45)  HR: 71 (03-27-24 @ 04:43)  BP: 150/68 (03-27-24 @ 04:43)  RR: 18 (03-27-24 @ 04:43)      03-25 @ 07:01  -  03-26 @ 07:00  --------------------------------------------------------  IN: 519 mL / OUT: 800 mL / NET: -281 mL    03-26 @ 07:01  -  03-27 @ 07:00  --------------------------------------------------------  IN: 888 mL / OUT: 1100 mL / NET: -212 mL          PHYSICAL EXAM:  Constitutional: NAD  Respiratory: CTAB, no wheezes, rales or rhonchi  Cardiovascular: S1, S2, RRR  Gastrointestinal: BS+, soft, NT/ND  Extremities: No cyanosis or clubbing. No peripheral edema  Skin: No rashes    LABS:  03-27    135  |  102  |  86<HH>  ----------------------------<  76  4.8   |  22  |  5.6<HH>    Ca    7.9<L>      27 Mar 2024 06:19  Phos  3.9     03-26  Mg     1.9     03-27                            8.8    9.34  )-----------( 222      ( 27 Mar 2024 06:19 )             28.0       Urine Studies:  Urinalysis Basic - ( 27 Mar 2024 06:19 )    Color:  / Appearance:  / SG:  / pH:   Gluc: 76 mg/dL / Ketone:   / Bili:  / Urobili:    Blood:  / Protein:  / Nitrite:    Leuk Esterase:  / RBC:  / WBC    Sq Epi:  / Non Sq Epi:  / Bacteria:       Sodium, Random Urine: 36.0 mmoL/L (03-23 @ 21:23)  Creatinine, Random Urine: 45 mg/dL (03-23 @ 21:23)  Protein/Creatinine Ratio Calculation: 2.4 Ratio (03-23 @ 21:23)  Osmolality, Random Urine: 273 mos/kg (03-23 @ 21:23)  Potassium, Random Urine: 16 mmol/L (03-23 @ 21:23)  Sodium, Random Urine: 47.0 mmoL/L (03-22 @ 21:20)  Creatinine, Random Urine: 42 mg/dL (03-22 @ 21:20)  Protein/Creatinine Ratio Calculation: 1.7 Ratio (03-22 @ 21:20)  Osmolality, Random Urine: 301 mos/kg (03-22 @ 21:20)  Potassium, Random Urine: 13 mmol/L (03-22 @ 21:20)      PTH -- (Ca 8.2)      [03-24-24 @ 20:00]   138  Vitamin D (25OH) 9      [03-24-24 @ 20:00]  Lipid: chol 144, , HDL 39, LDL --      [03-22-24 @ 06:14]      OSIEL: titer 1:160, pattern DFS70      [04-27-22 @ 16:00]  Rheumatoid Factor 31      [07-18-22 @ 05:21]  Free Light Chains: kappa 13.15, lambda 10.99, ratio = 1.20      [12-21 @ 07:39]  Immunofixation Serum:   No Monoclonal Band Identified    Reference Range: None Detected      [12-20-22 @ 10:57]  SPEP Interpretation: Normal Electrophoresis Pattern      [12-20-22 @ 10:57]  Immunofixation Urine: Reference Range: None Detected      [12-25-20 @ 12:35]  UPEP Interpretation: Mild Selective (Glomerular) Proteinuria      [12-25-20 @ 12:35]      RADIOLOGY & ADDITIONAL STUDIES:

## 2024-03-27 NOTE — PROGRESS NOTE ADULT - ASSESSMENT
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 Currently on Macrobid for UTI (pt was discharge from [facility] 3/11 papers in her chart) presents to the ED for fluctuating mental status and abdominal pain with occasional CP.  Nephrology was consulted for OMERO over CKD stage 5.  # OMERO on CKD stage 5/ anemia chronic   Likely pre-renal etiology from poor oral intake due to AMS and loss of appetite   Cr noted stable   Creatinine Trend: 5.6<--, 5.8<--, 5.7<--, 5.4<--, 5.5<--, 5.6<--   no diuretics still    continue sodium bicarbonate 650 q 12   sodium stable   phos at goal no need for binders   start vit D  calcitriol 0.25 mcg po q24h    no acute indication for RRT now but will need TDC and to start RRT soon / will discuss with patient   ? GOC    will follow

## 2024-03-27 NOTE — PROGRESS NOTE ADULT - SUBJECTIVE AND OBJECTIVE BOX
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  PAtient states she has lower abdominal pain with persistent dysuria. She says she had CP but doesn't remember when and how it was.  Per the ED note, patient's grandson said she is normally AAOx3 with good cognition but since her hospitalization she has not been herself. Tonight they tried to get her ready for bed and she was combative in trying to get her in her pajamas so he brought her to the ED.     ED vitals normal. labs showed hb 10 (at BL), Na 127, K 6.5 hemolyzed --> 4.9 on vbg, AG 16, BUN/CRea 119/4.8, , AST 53,   trop 74 --> 78, pro BNP 12.9K   EKG: NSR, vpaced, new TWI in inferolateral leads     CT abd pelv: 1. Prominent fluid-filled loops of small bowel with mild mucosal enhancement, may represent enteritis in the appropriate clinical setting.  2. The urinary bladder is thickened consistent with patient's current urinary tract infection.  Other chronic/incidental findings as above.  CTH: No acute intracranial pathology.    patient received levaquin and was admitted to telemetry for AMS and chest pain w/u and management.  (22 Mar 2024 04:02)    Patient intermittently answers questions. No family at bedside.     INTERVAL HISTORY:    - pt appears comfortable, seen at bedside returned to speak to her with her daughter   ADVANCE DIRECTIVES:    [ ] Full Code [ x] DNR  MOLST  [x ]  Living Will  [ ]   DECISION MAKER(s):  [x ] Health Care Proxy(s)  [ ] Surrogate(s)  [ ] Guardian           Name(s):  Answers: Emergency Contact Name Leona Marrufo,  Answers: Emergency Contact Phone # [phone],  Answers: Emergency Contact Relationship Dtr  BASELINE (I)ADL(s) (prior to admission):  [place]: [ ]Total  [ x] Moderate [ ]Dependent  Palliative Performance Status Version 2:         %    [email or web address]    Allergies    Keflex (Swelling)  cephalosporins (Angioedema)    Intolerances    MEDICATIONS  (STANDING):  amLODIPine   Tablet 5 milliGRAM(s) Oral at bedtime  apixaban 2.5 milliGRAM(s) Oral every 12 hours  atorvastatin 80 milliGRAM(s) Oral at bedtime  calcium acetate 1334 milliGRAM(s) Oral every 12 hours  folic acid 1 milliGRAM(s) Oral daily  isosorbide   mononitrate ER Tablet (IMDUR) 30 milliGRAM(s) Oral daily  levoFLOXacin IVPB 500 milliGRAM(s) IV Intermittent every 48 hours  metoprolol succinate ER 50 milliGRAM(s) Oral daily  mirtazapine 7.5 milliGRAM(s) Oral daily  montelukast 10 milliGRAM(s) Oral daily  sertraline 25 milliGRAM(s) Oral daily  simethicone 80 milliGRAM(s) Chew daily  sodium bicarbonate 650 milliGRAM(s) Oral every 12 hours    MEDICATIONS  (PRN):  acetaminophen     Tablet .. 650 milliGRAM(s) Oral every 6 hours PRN Mild Pain (1 - 3)  albuterol    90 MICROgram(s) HFA Inhaler 2 Puff(s) Inhalation every 6 hours PRN for shortness of breath and/or wheezing    PRESENT SYMPTOMS: [ ]Unable to obtain due to poor mentation   Source if other than patient:  [ ]Family   [ ]Team     Pain: [x]yes [ ]no  in back- chronic  daily, does not radiate  uses Tylenol at home     CPOT:    https://www.[facility].org/getattachment/cae47w58-2o7d-9f3g-5i5g-7009z7889a7v/Critical-Care-Pain-Observation-Tool-(CPOT)    PAIN AD Score:   [email or web address] (press ctrl +  left click to view)    Dyspnea:                           [x ]None[ ]Mild [ ]Moderate [ ]Severe     Respiratory Distress Observation Scale (RDOS):   A score of 0 to 2 signifies little or no respiratory distress, 3 signifies mild distress, scores 4 to 6 indicate moderate distress, and scores greater than 7 signify severe distress  [email or web address]    Anxiety:                             [ ]None[ ]Mild [ ]Moderate [ ]Severe   Fatigue:                             [ ]None[ ]Mild [x ]Moderate [ ]Severe   Nausea:                             [ ]None[ ]Mild [ ]Moderate [ ]Severe   Loss of appetite:              [ ]None[ ]Mild [x ]Moderate [ ]Severe   Constipation:                    [ ]None[ ]Mild [ ]Moderate [ ]Severe    Other Symptoms:  [x ]All other review of systems negative     Palliative Performance Status Version 2:         %    [email or web address]    PHYSICAL EXAM:  Vital Signs Last 24 Hrs  T(C): 35.8 (27 Mar 2024 14:34), Max: 38.6 (26 Mar 2024 20:45)  T(F): 96.5 (27 Mar 2024 14:34), Max: 101.4 (26 Mar 2024 20:45)  HR: 67 (27 Mar 2024 14:34) (67 - 91)  BP: 145/74 (27 Mar 2024 14:34) (128/61 - 170/84)  BP(mean): --  RR: 18 (27 Mar 2024 14:34) (18 - 18)  SpO2: --     I&O's Summary    26 Mar 2024 07:  -  27 Mar 2024 07:00  --------------------------------------------------------  IN: 888 mL / OUT: 1100 mL / NET: -212 mL    27 Mar 2024 07:  -  27 Mar 2024 15:04  --------------------------------------------------------  IN: 240 mL / OUT: 500 mL / NET: -260 mL    GENERAL:  NAD   PULMONARY:  Non labored breathing  NEUROLOGIC: Alert. Intermittently answers questions.   BEHAVIORAL/PSYCH:  Calm.         LABS: reviewed by me                        8.8    9.34  )-----------( 222      ( 27 Mar 2024 06:19 )             28.0       135  |  102  |  86<HH>  ----------------------------<  76  4.8   |  22  |  5.6<HH>    Ca    7.9<L>      27 Mar 2024 06:19  Phos  3.9       Mg     1.9             Urinalysis Basic - ( 27 Mar 2024 14:32 )    Color: Yellow / Appearance: Clear / S.013 / pH: x  Gluc: x / Ketone: Negative mg/dL  / Bili: Negative / Urobili: 0.2 mg/dL   Blood: x / Protein: 300 mg/dL / Nitrite: Negative   Leuk Esterase: Small / RBC: 3 /HPF / WBC 2 /HPF   Sq Epi: x / Non Sq Epi: 1 /HPF / Bacteria: Negative /HPF      RADIOLOGY & ADDITIONAL STUDIES: reviewed by me    EKG: reviewed by me        Palliative Care Spiritual/Emotional Screening Tool Question  Severity (0-4): 0  Score of 2 or greater indicates recommendation of Chaplaincy referral  Chaplaincy Referral: [ ] Yes [ ] Refused [ ] Following    Caregiver Vina:  [ x] Yes [ ] No [ ] Deferred  Social Work Referral [ x]  Patient and Family Centered Care Referral [ ]    Anticipatory Grief Present: [x ] Yes [ ] No [ ] Deferred  Social Work Referral [ x]  Patient and Family Centered Care Referral [ ]    Patient discussed with primary medical team MD  Palliative care education provided to patient and/or family

## 2024-03-27 NOTE — PROGRESS NOTE ADULT - ASSESSMENT
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     see GOC note  pt complains of chronic back pain uses tylenol at home

## 2024-03-27 NOTE — CHART NOTE - NSCHARTNOTEFT_GEN_A_CORE
Provider:                                              Met with: [ x  ] Patient  [   ] Family  [   ] Other:         Primary Language: [ x  ] English [   ] Other*:                      *Interpretation provided by:         SUPPORT DIAGNOSES            (Check all that apply)         [   ] EOL issues    [  x ] Advanced Illness    [   ] Cultural / spiritual concerns    [   ] Pain / suffering    [   ] Dementia / AMS    [   ] Other:    [   ] AD issues    [   ] Grief / loss / sadness    [   ] Discharge issues    [  x ] Distress / coping         PSYCHOSOCIAL ASSESSMENT OF PATIENT         (Check all that apply)         [ X  ] Initial Assessment            [   ] Reassessment          [   ] Not Applicable this visit         Pain/suffering acuity:    [  x ] None to mild (0-3)           [   ] Moderate (4-6)        [   ] High (7-10)         Mental Status:    [   ] Alert/oriented (x3)          [  x ] Confused/Altered(x2/x1)         [   ] Non-resp         Functional status:    [   ] Independent w ADLs      [  x ] Needs Assistance             [   ] Bedbound/Full Care         Coping:    [x   ] Coping well                     [  ] Coping w/difficulty            [   ] Poor coping         Support system:    [  x ] Strong                              [   ] Adequate                        [   ] Inadequate              Past history and medications for:         [ ] Anxiety       [ ] Depression    [ ] Sleep disorders              SERVICE PROVIDED    [   ]Discharge support / facilitation    [   ]AD / goals of care counseling                                  [   ]EOL / death / bereavement counseling    [ X  ]Counseling / support                                                [   ] Family meeting    [   ]Prayer / sacrament / ritual                                      [   ] Referral    [   ]Other                                                                           NOTE and Plan of Care (PoC):  87yo  female patient ([employer]) with hx of HTN, 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 Palliative Care consulted for GOC.  ERASTO met with Pt at bedside. Provider:                                              Met with: [ x  ] Patient  [   ] Family  [   ] Other:         Primary Language: [ x  ] English [   ] Other*:                      *Interpretation provided by:         SUPPORT DIAGNOSES            (Check all that apply)         [   ] EOL issues    [  x ] Advanced Illness    [   ] Cultural / spiritual concerns    [   ] Pain / suffering    [   ] Dementia / AMS    [   ] Other:    [   ] AD issues    [   ] Grief / loss / sadness    [   ] Discharge issues    [  x ] Distress / coping         PSYCHOSOCIAL ASSESSMENT OF PATIENT         (Check all that apply)         [ X  ] Initial Assessment            [   ] Reassessment          [   ] Not Applicable this visit         Pain/suffering acuity:    [  x ] None to mild (0-3)           [   ] Moderate (4-6)        [   ] High (7-10)         Mental Status:    [   ] Alert/oriented (x3)          [  x ] Confused/Altered(x2/x1)         [   ] Non-resp         Functional status:    [   ] Independent w ADLs      [  x ] Needs Assistance             [   ] Bedbound/Full Care         Coping:    [x   ] Coping well                     [  ] Coping w/difficulty            [   ] Poor coping         Support system:    [  x ] Strong                              [   ] Adequate                        [   ] Inadequate              Past history and medications for:         [ ] Anxiety       [ ] Depression    [ ] Sleep disorders              SERVICE PROVIDED    [   ]Discharge support / facilitation    [   ]AD / goals of care counseling                                  [   ]EOL / death / bereavement counseling    [ X  ]Counseling / support                                                [   ] Family meeting    [   ]Prayer / sacrament / ritual                                      [   ] Referral    [   ]Other                                                                           NOTE and Plan of Care (PoC):  89yo  female patient ([employer]) with hx of HTN, 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 Palliative Care consulted for GOC.  LMSW and Palliative Care NP met with Pt and daughter Phuong at bedside. Palliative Care role introduced. Pt noted she had pain in her back but no other reports of pain. GOC discussed. Pt and daughter noted they will discuss risks and benefits of HD. Pt reports she's afraid and emotional support provided. Pt's daughter notes she wants Pt to express her wishes and wants PT involved with medical decision making. Pt in agreement to deactivate AICD but daughter requested information if AICD can be re-activated. EP to be consulted. Will continue to follow for on-going GOC and supportive counseling. Questions answered and support rendered. [id number] Provider:                                              Met with: [ x  ] Patient  [   ] Family  [   ] Other:         Primary Language: [ x  ] English [   ] Other*:                      *Interpretation provided by:         SUPPORT DIAGNOSES            (Check all that apply)         [   ] EOL issues    [  x ] Advanced Illness    [   ] Cultural / spiritual concerns    [   ] Pain / suffering    [   ] Dementia / AMS    [   ] Other:    [   ] AD issues    [   ] Grief / loss / sadness    [   ] Discharge issues    [  x ] Distress / coping         PSYCHOSOCIAL ASSESSMENT OF PATIENT         (Check all that apply)         [ X  ] Initial Assessment            [   ] Reassessment          [   ] Not Applicable this visit         Pain/suffering acuity:    [  x ] None to mild (0-3)           [   ] Moderate (4-6)        [   ] High (7-10)         Mental Status:    [   ] Alert/oriented (x3)          [  x ] Confused/Altered(x2/x1)         [   ] Non-resp         Functional status:    [   ] Independent w ADLs      [  x ] Needs Assistance             [   ] Bedbound/Full Care         Coping:    [x   ] Coping well                     [  ] Coping w/difficulty            [   ] Poor coping         Support system:    [  x ] Strong                              [   ] Adequate                        [   ] Inadequate              Past history and medications for:         [ ] Anxiety       [ ] Depression    [ ] Sleep disorders              SERVICE PROVIDED    [   ]Discharge support / facilitation    [   ]AD / goals of care counseling                                  [   ]EOL / death / bereavement counseling    [ X  ]Counseling / support                                                [   ] Family meeting    [   ]Prayer / sacrament / ritual                                      [   ] Referral    [   ]Other                                                                           NOTE and Plan of Care (PoC):  89yo  female patient ([employer]) with hx of HTN, 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 Palliative Care consulted for GOC.  LMSW and Palliative Care NP met with Pt and daughter Phuong at bedside. Palliative Care role introduced. Pt noted she had pain in her back but no other reports of pain. GOC discussed. Pt and daughter noted they will discuss risks and benefits of HD. Pt reports she's afraid and emotional support provided. Discussed that if Pt declines to proceed with HS that hospice would be recommended.  Pt's daughter notes she wants Pt to express her wishes and wants PT involved with medical decision making. Pt in agreement to deactivate AICD but daughter requested information if AICD can be re-activated. EP to be consulted. Pt and daughter to further discuss option of continued treatment: HD vs. no HD and hospice if open to exploring. Will continue to follow for on-going GOC and supportive counseling. Questions answered and support rendered. [id number]

## 2024-03-27 NOTE — CHART NOTE - NSCHARTNOTEFT_GEN_A_CORE
Spoke to EP about de-activation of AICD after discussion with palliative care. In regards to daughter's question about re-activation, it can be re-activated at a later time if patient requested.

## 2024-03-28 DIAGNOSIS — R41.82 ALTERED MENTAL STATUS, UNSPECIFIED: ICD-10-CM

## 2024-03-28 DIAGNOSIS — I10 ESSENTIAL (PRIMARY) HYPERTENSION: ICD-10-CM

## 2024-03-28 DIAGNOSIS — Z87.39 PERSONAL HISTORY OF OTHER DISEASES OF THE MUSCULOSKELETAL SYSTEM AND CONNECTIVE TISSUE: ICD-10-CM

## 2024-03-28 DIAGNOSIS — N18.5 CHRONIC KIDNEY DISEASE, STAGE 5: ICD-10-CM

## 2024-03-28 DIAGNOSIS — I50.22 CHRONIC SYSTOLIC (CONGESTIVE) HEART FAILURE: ICD-10-CM

## 2024-03-28 DIAGNOSIS — I82.409 ACUTE EMBOLISM AND THROMBOSIS OF UNSPECIFIED DEEP VEINS OF UNSPECIFIED LOWER EXTREMITY: ICD-10-CM

## 2024-03-28 DIAGNOSIS — Z79.899 OTHER LONG TERM (CURRENT) DRUG THERAPY: ICD-10-CM

## 2024-03-28 LAB
ANION GAP SERPL CALC-SCNC: 15 MMOL/L — HIGH (ref 7–14)
BUN SERPL-MCNC: 86 MG/DL — CRITICAL HIGH (ref 10–20)
CALCIUM SERPL-MCNC: 8.3 MG/DL — LOW (ref 8.4–10.5)
CHLORIDE SERPL-SCNC: 101 MMOL/L — SIGNIFICANT CHANGE UP (ref 98–110)
CO2 SERPL-SCNC: 20 MMOL/L — SIGNIFICANT CHANGE UP (ref 17–32)
CREAT SERPL-MCNC: 6.2 MG/DL — CRITICAL HIGH (ref 0.7–1.5)
EGFR: 6 ML/MIN/1.73M2 — LOW
GLUCOSE SERPL-MCNC: 69 MG/DL — LOW (ref 70–99)
HBV SURFACE AB SER-ACNC: SIGNIFICANT CHANGE UP
HBV SURFACE AG SER-ACNC: SIGNIFICANT CHANGE UP
HCT VFR BLD CALC: 30.8 % — LOW (ref 37–47)
HGB BLD-MCNC: 9.9 G/DL — LOW (ref 12–16)
MAGNESIUM SERPL-MCNC: 1.8 MG/DL — SIGNIFICANT CHANGE UP (ref 1.8–2.4)
MCHC RBC-ENTMCNC: 28 PG — SIGNIFICANT CHANGE UP (ref 27–31)
MCHC RBC-ENTMCNC: 32.1 G/DL — SIGNIFICANT CHANGE UP (ref 32–37)
MCV RBC AUTO: 87.3 FL — SIGNIFICANT CHANGE UP (ref 81–99)
NRBC # BLD: 0 /100 WBCS — SIGNIFICANT CHANGE UP (ref 0–0)
PLATELET # BLD AUTO: 211 K/UL — SIGNIFICANT CHANGE UP (ref 130–400)
PMV BLD: 9.4 FL — SIGNIFICANT CHANGE UP (ref 7.4–10.4)
POTASSIUM SERPL-MCNC: 4.9 MMOL/L — SIGNIFICANT CHANGE UP (ref 3.5–5)
POTASSIUM SERPL-SCNC: 4.9 MMOL/L — SIGNIFICANT CHANGE UP (ref 3.5–5)
RAPID RVP RESULT: SIGNIFICANT CHANGE UP
RBC # BLD: 3.53 M/UL — LOW (ref 4.2–5.4)
RBC # FLD: 13.6 % — SIGNIFICANT CHANGE UP (ref 11.5–14.5)
SARS-COV-2 RNA SPEC QL NAA+PROBE: SIGNIFICANT CHANGE UP
SODIUM SERPL-SCNC: 136 MMOL/L — SIGNIFICANT CHANGE UP (ref 135–146)
WBC # BLD: 12.74 K/UL — HIGH (ref 4.8–10.8)
WBC # FLD AUTO: 12.74 K/UL — HIGH (ref 4.8–10.8)

## 2024-03-28 PROCEDURE — 99233 SBSQ HOSP IP/OBS HIGH 50: CPT

## 2024-03-28 RX ORDER — CALCITRIOL 0.5 UG/1
0.25 CAPSULE ORAL DAILY
Refills: 0 | Status: DISCONTINUED | OUTPATIENT
Start: 2024-03-28 | End: 2024-04-03

## 2024-03-28 RX ADMIN — Medication 1334 MILLIGRAM(S): at 06:04

## 2024-03-28 RX ADMIN — SIMETHICONE 80 MILLIGRAM(S): 80 TABLET, CHEWABLE ORAL at 11:54

## 2024-03-28 RX ADMIN — APIXABAN 2.5 MILLIGRAM(S): 2.5 TABLET, FILM COATED ORAL at 06:05

## 2024-03-28 RX ADMIN — MONTELUKAST 10 MILLIGRAM(S): 4 TABLET, CHEWABLE ORAL at 11:49

## 2024-03-28 RX ADMIN — Medication 1 MILLIGRAM(S): at 11:49

## 2024-03-28 RX ADMIN — AMLODIPINE BESYLATE 5 MILLIGRAM(S): 2.5 TABLET ORAL at 22:19

## 2024-03-28 RX ADMIN — ISOSORBIDE MONONITRATE 30 MILLIGRAM(S): 60 TABLET, EXTENDED RELEASE ORAL at 11:49

## 2024-03-28 RX ADMIN — Medication 50 MILLIGRAM(S): at 06:03

## 2024-03-28 RX ADMIN — Medication 1334 MILLIGRAM(S): at 17:45

## 2024-03-28 RX ADMIN — SERTRALINE 25 MILLIGRAM(S): 25 TABLET, FILM COATED ORAL at 11:49

## 2024-03-28 RX ADMIN — MIRTAZAPINE 7.5 MILLIGRAM(S): 45 TABLET, ORALLY DISINTEGRATING ORAL at 11:49

## 2024-03-28 RX ADMIN — APIXABAN 2.5 MILLIGRAM(S): 2.5 TABLET, FILM COATED ORAL at 17:45

## 2024-03-28 RX ADMIN — Medication 650 MILLIGRAM(S): at 17:46

## 2024-03-28 RX ADMIN — ATORVASTATIN CALCIUM 80 MILLIGRAM(S): 80 TABLET, FILM COATED ORAL at 22:19

## 2024-03-28 RX ADMIN — Medication 650 MILLIGRAM(S): at 06:01

## 2024-03-28 NOTE — PROGRESS NOTE ADULT - CONVERSATION DETAILS
Spoke to daughter Phuong    - she said after we met she spoke with her mother and that her mom wants to continue the AICD function - wants to be DNR/DNI. Does want to pursue hemodialysis if offered. Would want rehab    Family and patient aware of option of hospice care, and AICD deactivation.
Palliative NP and SW met with patient and her daughter    Reviewed her medical conditions and current acute medical issues. Pt and daughter acknowledge she has been weaker lately and that she needs more assistance. She also knows she has heart and kidney disease that is very advanced.     We discussed the AICD and turning it off so that it doesn't start firing when she is dying. She said she would want it turned off. Daughter supports her decisions. Discussed that when a person is DNR/DNI this is recommended. AICD was placed " years ago"    Discussed hemodialysis and pros and cons of it. Patient said multiple times " I'm scared" in reference to hemodialysis and discussing her prognosis. Offered to speak to her daughter alone but she does want to discuss her medical condition. Support provided from palliative NP and SW     Did explain that if pt does not want dialysis we would recommend her considering hospice care. She seems to still want to try to get stronger but did not commit to a decision.

## 2024-03-28 NOTE — PROGRESS NOTE ADULT - ASSESSMENT
RACHEL MARRUFO is a 89 yo woman with h/o htn, DVT/PE in 2018 and in 2019, HFrEF s/p aicd and ckd V who p/w AMS and abdominal pain after being discharged from osh ([facility]) on 3/11 to complete macrobid abx course and now p/w ams and confusion with OMERO on CKD5    #Metabolic encephalopathy, improved  #OMERO on CKD stage V--2/2 dehydration and poor oral intake  #Resolved Cystitis  #Diarrhea---Enteritis  #HFrEF -- s/p Aicd  #h/o DVT/PE  - tele monitoring  - continue to monitor bmp closely  - nephrology following---no acute indication for RRT at this time   - hold nephrotoxic meds  - closely monitor vs-----can hold additional bp meds if becomes hypotensive (both entresto and lasix on hold---restart as cr and bp tolerates)  - continue ivf 50cc/hr with hc03 -> DC fluids  - lokelma 10g q24h for elevated potassium  - check stool study pending collection  - eps interrogated device   - continue eliquis 2.5 mg bid  - f/u ua/ucr/ulytes -> wnl   - primafit to monitor and document I/o  - repeat UA neg  - repeat Blood cultures neg  - palliative care consulted for GOC  - GOC conversation continuing with HCP -> HCP and patient want dialysis as per last palliative note    #suspected pneumonia  #perihilar opacities  - CXR showing perihilar opacities in setting of overnight fever and tachycardia  - renally dose Levofloxacin 500mg q48h x 5 days  - increased WBC count; trend WBC    # HTN  amLODIPine   Tablet 5 milliGRAM(s) Oral at bedtime  isosorbide   mononitrate ER Tablet (IMDUR) 30 milliGRAM(s) Oral daily  metoprolol succinate ER 50 milliGRAM(s) Oral daily    # Hx of dvt/pe  - on apixaban 2.5 po bid    # HLD  atorvastatin 80 milliGRAM(s) Oral at bedtime     # Depression   - starting mirtazapine 7.5 milliGRAM(s) Oral daily for mood and appetite stimulation  - sertraline 25 milliGRAM(s) Oral daily     DVT/GI ppx  FULL CODE  dispo: tele  HCP: Leona Marrufo - [phone]  pend: TDC placement?

## 2024-03-28 NOTE — PROGRESS NOTE ADULT - ASSESSMENT
88F PMHx HTN, h/o DVT/ PE 2018 on ac, HFrEF s/p AICD, RA, CKD V, recent uti here with altered mental status.

## 2024-03-28 NOTE — PROGRESS NOTE ADULT - PROBLEM SELECTOR PLAN 4
amLODIPine   Tablet 5 milliGRAM(s) Oral at bedtime  isosorbide   mononitrate ER Tablet (IMDUR) 30 milliGRAM(s) Oral daily  metoprolol succinate ER 50 milliGRAM(s) Oral daily  AICD - pt is DNR - family decided to leave on AICD are aware that it is recommended to deactivate when a person is DNR - but want it to remain on
eliquis
DNR/DNI  - ongoing goals of care see GOC note

## 2024-03-28 NOTE — PROGRESS NOTE ADULT - NSPROGADDITIONALINFOA_GEN_ALL_CORE
#Progress Note Handoff  Pending (specify): f/u renal for HD, cont iv abx, goc  Family discussion:   Disposition: snf vs. home

## 2024-03-28 NOTE — PROGRESS NOTE ADULT - PROBLEM SELECTOR PLAN 3
DNR/DNI  - family aware of options  - want ongoing medical care  - palliative care will sign off re-consult as needed DNR/DNI  - HCP Phuong   - family aware of options  - want ongoing medical care  - palliative care will sign off re-consult as needed

## 2024-03-28 NOTE — PROGRESS NOTE ADULT - SUBJECTIVE AND OBJECTIVE BOX
seen and examined   24 h events noted   no distress         PAST HISTORY  --------------------------------------------------------------------------------  No significant changes to PMH, PSH, FHx, SHx, unless otherwise noted    ALLERGIES & MEDICATIONS  --------------------------------------------------------------------------------  Allergies    Keflex (Swelling)  cephalosporins (Angioedema)    Intolerances      Standing Inpatient Medications  amLODIPine   Tablet 5 milliGRAM(s) Oral at bedtime  apixaban 2.5 milliGRAM(s) Oral every 12 hours  atorvastatin 80 milliGRAM(s) Oral at bedtime  calcium acetate 1334 milliGRAM(s) Oral every 12 hours  folic acid 1 milliGRAM(s) Oral daily  isosorbide   mononitrate ER Tablet (IMDUR) 30 milliGRAM(s) Oral daily  levoFLOXacin IVPB 500 milliGRAM(s) IV Intermittent every 48 hours  metoprolol succinate ER 50 milliGRAM(s) Oral daily  mirtazapine 7.5 milliGRAM(s) Oral daily  montelukast 10 milliGRAM(s) Oral daily  sertraline 25 milliGRAM(s) Oral daily  simethicone 80 milliGRAM(s) Chew daily  sodium bicarbonate 650 milliGRAM(s) Oral every 12 hours    PRN Inpatient Medications  acetaminophen     Tablet .. 650 milliGRAM(s) Oral every 6 hours PRN  albuterol    90 MICROgram(s) HFA Inhaler 2 Puff(s) Inhalation every 6 hours PRN        VITALS/PHYSICAL EXAM  --------------------------------------------------------------------------------  T(C): 36.5 (03-28-24 @ 05:07), Max: 36.7 (03-27-24 @ 20:30)  HR: 94 (03-28-24 @ 05:07) (67 - 94)  BP: 161/70 (03-28-24 @ 05:07) (145/74 - 161/70)  RR: 18 (03-28-24 @ 05:07) (18 - 18)  SpO2: 98% (03-27-24 @ 19:35) (98% - 98%)  Wt(kg): --        03-27-24 @ 07:01  -  03-28-24 @ 07:00  --------------------------------------------------------  IN: 540 mL / OUT: 1250 mL / NET: -710 mL      Physical Exam:  	Gen: NAD  	Pulm: decrease BS  B/L  	CV:  S1S2; no rub  	Abd:  distended  	    LABS/STUDIES  --------------------------------------------------------------------------------              9.9    12.74 >-----------<  211      [03-28-24 @ 05:49]              30.8     136  |  101  |  86  ----------------------------<  69      [03-28-24 @ 05:49]  4.9   |  20  |  6.2        Ca     8.3     [03-28-24 @ 05:49]      Mg     1.8     [03-28-24 @ 05:49]    TPro  4.9  /  Alb  2.8  /  TBili  0.3  /  DBili  <0.2  /  AST  30  /  ALT  15  /  AlkPhos  99  [03-27-24 @ 15:55]          Creatinine Trend:  SCr 6.2 [03-28 @ 05:49]  SCr 5.6 [03-27 @ 06:19]  SCr 5.8 [03-26 @ 06:47]  SCr 5.7 [03-26 @ 01:16]  SCr 5.4 [03-25 @ 18:10]    Urinalysis - [03-28-24 @ 05:49]      Color  / Appearance  / SG  / pH       Gluc 69 / Ketone   / Bili  / Urobili        Blood  / Protein  / Leuk Est  / Nitrite       RBC  / WBC  / Hyaline  / Gran  / Sq Epi  / Non Sq Epi  / Bacteria     Urine Creatinine 45      [03-23-24 @ 21:23]  Urine Protein 108      [03-23-24 @ 21:23]  Urine Sodium 36.0      [03-23-24 @ 21:23]  Urine Urea Nitrogen 405      [03-23-24 @ 21:23]  Urine Potassium 16      [03-23-24 @ 21:23]  Urine Osmolality 273      [03-23-24 @ 21:23]    PTH -- (Ca 8.2)      [03-24-24 @ 20:00]   138  Vitamin D (25OH) 9      [03-24-24 @ 20:00]  Lipid: chol 144, , HDL 39, LDL --      [03-22-24 @ 06:14]    HBsAb Nonreact      [03-27-24 @ 15:55]  HBsAg Nonreact      [03-27-24 @ 15:55]

## 2024-03-28 NOTE — PROGRESS NOTE ADULT - TREATMENT GUIDELINE COMMENT
DNR/DNI  - ongoing AICD  - would consider hemodialysis   - want rehab if offered
DNR/DNI  discussed AICD deactivation, will call EPS  also discussed hemodialysis - patient leaning towards not getting it wants more information

## 2024-03-28 NOTE — PROGRESS NOTE ADULT - PROBLEM SELECTOR PROBLEM 5
Depression with anxiety
Chronic HFrEF (heart failure with reduced ejection fraction)
Congestive heart failure

## 2024-03-28 NOTE — PROGRESS NOTE ADULT - PROBLEM SELECTOR PLAN 1
- nephrology following---no acute indication for RRT at this time   - hold nephrotoxic meds  - closely monitor vs-----can hold additional bp meds if becomes hypotensive (both entresto and lasix on hold---restart as cr and bp tolerates)  - continue ivf 50cc/hr with hc03 -> DC fluids  - lokelma 10g q24h for elevated potassium
toxic metabolic encephalopathy; in setting of recent uti  +fever; possible gastroenteritis  cth neg  ct abd with bladder wall thickening, enteritis  ucx ecoli  bcx ntd  levaquin to complete course  goc; f/u palliative
# OMERO on CKD stage 5/ anemia chronic   Likely pre-renal etiology from poor oral intake due to AMS and loss of appetite   Cr noted trending up    continue sodium bicarbonate 650 q 12   sodium stable   phos at goal no need for binders   start vit D  calcitriol 0.25 mcg po q24h    no acute indication for RRT now but will need TDC and to start RRT soon / doubt will change her overall prognosis

## 2024-03-28 NOTE — PROGRESS NOTE ADULT - PROBLEM SELECTOR PLAN 5
outpt f/u
amLODIPine   Tablet 5 milliGRAM(s) Oral at bedtime  isosorbide   mononitrate ER Tablet (IMDUR) 30 milliGRAM(s) Oral daily  metoprolol succinate ER 50 milliGRAM(s) Oral daily  AICD - pt is DNR will get deactivated
sertraline 25 milliGRAM(s) Oral daily  - supportive care

## 2024-03-28 NOTE — PROGRESS NOTE ADULT - SUBJECTIVE AND OBJECTIVE BOX
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  PAtient states she has lower abdominal pain with persistent dysuria. She says she had CP but doesn't remember when and how it was.  Per the ED note, patient's grandson said she is normally AAOx3 with good cognition but since her hospitalization she has not been herself. Tonight they tried to get her ready for bed and she was combative in trying to get her in her pajamas so he brought her to the ED.         INTERVAL HISTORY:    - pt appears comfortable, seen at bedside   ADVANCE DIRECTIVES:    [ ] Full Code [ x] DNR  MOLST  [x ]  Living Will  [ ]   DECISION MAKER(s):  [x ] Health Care Proxy(s)  [ ] Surrogate(s)  [ ] Guardian           Name(s):  Answers: Emergency Contact Name Leona Marrufo,  Answers: Emergency Contact Phone # [phone],  Answers: Emergency Contact Relationship Dtr  BASELINE (I)ADL(s) (prior to admission):  [place]: [ ]Total  [ x] Moderate [ ]Dependent  Palliative Performance Status Version 2:         %    http://npcrc.org/files/news/palliative_performance_scale_ppsv2.pdf    Allergies    Keflex (Swelling)  cephalosporins (Angioedema)    Intolerances  MEDICATIONS  (STANDING):  amLODIPine   Tablet 5 milliGRAM(s) Oral at bedtime  apixaban 2.5 milliGRAM(s) Oral every 12 hours  atorvastatin 80 milliGRAM(s) Oral at bedtime  calcium acetate 1334 milliGRAM(s) Oral every 12 hours  folic acid 1 milliGRAM(s) Oral daily  isosorbide   mononitrate ER Tablet (IMDUR) 30 milliGRAM(s) Oral daily  levoFLOXacin IVPB 500 milliGRAM(s) IV Intermittent every 48 hours  metoprolol succinate ER 50 milliGRAM(s) Oral daily  mirtazapine 7.5 milliGRAM(s) Oral daily  montelukast 10 milliGRAM(s) Oral daily  sertraline 25 milliGRAM(s) Oral daily  simethicone 80 milliGRAM(s) Chew daily  sodium bicarbonate 650 milliGRAM(s) Oral every 12 hours    MEDICATIONS  (PRN):  acetaminophen     Tablet .. 650 milliGRAM(s) Oral every 6 hours PRN Mild Pain (1 - 3)  albuterol    90 MICROgram(s) HFA Inhaler 2 Puff(s) Inhalation every 6 hours PRN for shortness of breath and/or wheezing    PRESENT SYMPTOMS: [ ]Unable to obtain due to poor mentation   Source if other than patient:  [ ]Family   [ ]Team     Pain: [ ]yes [ ]no  QOL impact -   Location -                    Aggravating factors -  Quality -  Radiation -  Timing-  Severity (0-10 scale):  Minimal acceptable level (0-10 scale):     CPOT:    https://www.[facility].org/getattachment/[email or web address]/Critical-Care-Pain-Observation-Tool-(CPOT)    PAIN AD Score:   http://geriatrictoolkit.[facility]/cog/painad.pdf (press ctrl +  left click to view)    Dyspnea:                           [ ]None[ ]Mild [ ]Moderate [ ]Severe     Respiratory Distress Observation Scale (RDOS):   A score of 0 to 2 signifies little or no respiratory distress, 3 signifies mild distress, scores 4 to 6 indicate moderate distress, and scores greater than 7 signify severe distress  [email or web address]://www.[email or web address]/sites/default/files/PDFS/311361-ufpfqzhnchn-kcmnnvvd-gfsjykvjnfy-bjtah.pdf    Anxiety:                             [ ]None[ ]Mild [ ]Moderate [ ]Severe   Fatigue:                             [ ]None[ ]Mild [ ]Moderate [ ]Severe   Nausea:                             [ ]None[ ]Mild [ ]Moderate [ ]Severe   Loss of appetite:              [ ]None[ ]Mild [ ]Moderate [ ]Severe   Constipation:                    [ ]None[ ]Mild [ ]Moderate [ ]Severe    Other Symptoms:  [ ]All other review of systems negative     Palliative Performance Status Version 2:         %    http://[facility].org/files/news/palliative_performance_scale_ppsv2.pdf    PHYSICAL EXAM:  Vital Signs Last 24 Hrs  T(C): 36.5 (28 Mar 2024 05:07), Max: 36.7 (27 Mar 2024 20:30)  T(F): 97.7 (28 Mar 2024 05:07), Max: 98 (27 Mar 2024 20:30)  HR: 94 (28 Mar 2024 05:07) (67 - 94)  BP: 161/70 (28 Mar 2024 05:07) (145/74 - 161/70)  BP(mean): --  RR: 18 (28 Mar 2024 05:07) (18 - 18)  SpO2: 98% (27 Mar 2024 19:35) (98% - 98%)    Parameters below as of 27 Mar 2024 19:35  Patient On (Oxygen Delivery Method): nasal cannula  O2 Flow (L/min): 2   I&O's Summary    27 Mar 2024 07:01  -  28 Mar 2024 07:00  --------------------------------------------------------  IN: 540 mL / OUT: 1250 mL / NET: -710 mL        GENERAL:  [ ] No acute distress [ ]Lethargic  [ ]Unarousable  [ ]Verbal  [ ]Non-Verbal [ ]Cachexia    BEHAVIORAL/PSYCH:  [ ]Alert and Oriented x  [ ] Anxiety [ ] Delirium [ ] Agitation [ ] Calm   EYES: [ ] No scleral icterus [ ] Scleral icterus [ ] Closed  ENMT:  [ ]Dry mouth  [ ]No external oral lesions [ ] No external ear or nose lesions  CARDIOVASCULAR:  [ ]Regular [ ]Irregular [ ]Tachy [ ]Not Tachy  [ ]Jacinto [ ] Edema [ ] No edema  PULMONARY:  [ ]Tachypnea  [ ]Audible excessive secretions [ ] No labored breathing [ ] labored breathing  GASTROINTESTINAL: [ ]Soft  [ ]Distended  [ ]Not distended [ ]Non tender [ ]Tender  MUSCULOSKELETAL: [ ]No clubbing [ ] clubbing  [ ] No cyanosis [ ] cyanosis  NEUROLOGIC: [ ]No focal deficits  [ ]Follows commands  [ ]Does not follow commands  [ ]Cognitive impairment  [ ]Dysphagia  [ ]Dysarthria  [ ]Paresis   SKIN: [ ] Jaundiced [ ] Non-jaundiced [ ]Rash [ ]No Rash [ ] Warm [ ] Dry  MISC/LINES: [ ] ET tube [ ] Trach [ ]NGT/OGT [ ]PEG [ ]Morgan    LABS: reviewed by me                        9.9    12.74 )-----------( 211      ( 28 Mar 2024 05:49 )             30.8   03-28    136  |  101  |  86<HH>  ----------------------------<  69<L>  4.9   |  20  |  6.2<HH>    Ca    8.3<L>      28 Mar 2024 05:49  Mg     1.8     03-28    TPro  4.9<L>  /  Alb  2.8<L>  /  TBili  0.3  /  DBili  <0.2  /  AST  30  /  ALT  15  /  AlkPhos  99  03-27      Urinalysis Basic - ( 28 Mar 2024 05:49 )    Color: x / Appearance: x / SG: x / pH: x  Gluc: 69 mg/dL / Ketone: x  / Bili: x / Urobili: x   Blood: x / Protein: x / Nitrite: x   Leuk Esterase: x / RBC: x / WBC x   Sq Epi: x / Non Sq Epi: x / Bacteria: x      RADIOLOGY & ADDITIONAL STUDIES: reviewed by me    EKG: reviewed by me      PROTEIN CALORIE MALNUTRITION PRESENT: [ ]mild [ ]moderate [ ]severe [ ]underweight [ ]morbid obesity  https://www.andeal.org/vault/2440/web/files/ONC/Table_Clinical%20Characteristics%20to%20Document%20Malnutrition-White%20JV%20et%20al%202012.pdf    Height (cm): 160 (03-23-24 @ 09:42)  Weight (kg): 57.1 (03-23-24 @ 09:42)  BMI (kg/m2): 22.3 (03-23-24 @ 09:42)  [ ]PPSV2 < or = to 30% [ ]significant weight loss  [ ]poor nutritional intake  [ ]anasarca      [ ]Artificial Nutrition      Palliative Care Spiritual/Emotional Screening Tool Question  Severity (0-4):  Score of 2 or greater indicates recommendation of Chaplaincy referral  Chaplaincy Referral: [ ] Yes [ ] Refused [ ] Following    Caregiver [place]:  [ ] Yes [ ] No [ ] Deferred  Social Work Referral [ ]  Patient and Family Centered Care Referral [ ]    Anticipatory Grief Present: [ ] Yes [ ] No [ ] Deferred  Social Work Referral [ ]  Patient and Family Centered Care Referral [ ]    Patient discussed with primary medical team MD  Palliative care education provided to patient and/or family     HPI:  87yo  female patient ([employer]) with hx of HTN, DVT/PE 2018 (thought provoked by travel) completed 6m of AC then 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  PAtient states she has lower abdominal pain with persistent dysuria. She says she had CP but doesn't remember when and how it was.  Per the ED note, patient's grandson said she is normally AAOx3 with good cognition but since her hospitalization she has not been herself. Tonight they tried to get her ready for bed and she was combative in trying to get her in her pajamas so he brought her to the ED.         INTERVAL HISTORY:    - pt appears comfortable, seen at bedside   ADVANCE DIRECTIVES:    [ ] Full Code [ x] DNR  MOLST  [x ]  Living Will  [ ]   DECISION MAKER(s):  [x ] Health Care Proxy(s)  [ ] Surrogate(s)  [ ] Guardian           Name(s):  Answers: Emergency Contact Name Leona Marrufo,  Answers: Emergency Contact Phone # [phone],  Answers: Emergency Contact Relationship Dtr  BASELINE (I)ADL(s) (prior to admission):  [place]: [ ]Total  [ x] Moderate [ ]Dependent  Palliative Performance Status Version 2:         %    http://npcrc.org/files/news/palliative_performance_scale_ppsv2.pdf    Allergies    Keflex (Swelling)  cephalosporins (Angioedema)    Intolerances  MEDICATIONS  (STANDING):  amLODIPine   Tablet 5 milliGRAM(s) Oral at bedtime  apixaban 2.5 milliGRAM(s) Oral every 12 hours  atorvastatin 80 milliGRAM(s) Oral at bedtime  calcium acetate 1334 milliGRAM(s) Oral every 12 hours  folic acid 1 milliGRAM(s) Oral daily  isosorbide   mononitrate ER Tablet (IMDUR) 30 milliGRAM(s) Oral daily  levoFLOXacin IVPB 500 milliGRAM(s) IV Intermittent every 48 hours  metoprolol succinate ER 50 milliGRAM(s) Oral daily  mirtazapine 7.5 milliGRAM(s) Oral daily  montelukast 10 milliGRAM(s) Oral daily  sertraline 25 milliGRAM(s) Oral daily  simethicone 80 milliGRAM(s) Chew daily  sodium bicarbonate 650 milliGRAM(s) Oral every 12 hours    MEDICATIONS  (PRN):  acetaminophen     Tablet .. 650 milliGRAM(s) Oral every 6 hours PRN Mild Pain (1 - 3)  albuterol    90 MICROgram(s) HFA Inhaler 2 Puff(s) Inhalation every 6 hours PRN for shortness of breath and/or wheezing    PRESENT SYMPTOMS: [ ]Unable to obtain due to poor mentation   Source if other than patient:  [ ]Family   [ ]Team     Pain: [ ]yes [ x]no  QOL impact -   Location -                    Aggravating factors -  Quality -  Radiation -  Timing-  Severity (0-10 scale):  Minimal acceptable level (0-10 scale):     CPOT:    https://www.[facility].org/getattachment/[email or web address]/Critical-Care-Pain-Observation-Tool-(CPOT)    PAIN AD Score:   http://geriatrictoolkit.[facility]/cog/painad.pdf (press ctrl +  left click to view)    Dyspnea:                           [x ]None[ ]Mild [ ]Moderate [ ]Severe     Respiratory Distress Observation Scale (RDOS):   A score of 0 to 2 signifies little or no respiratory distress, 3 signifies mild distress, scores 4 to 6 indicate moderate distress, and scores greater than 7 signify severe distress  https://www.[facility].ca/sites/default/files/PDFS/664781-xpeqsivptjl-bovcgvzp-gozojeelrbx-wytzd.pdf    Anxiety:                             [ ]None[ ]Mild [ ]Moderate [ ]Severe   Fatigue:                             [ ]None[ ]Mild [ ]Moderate [ ]Severe   Nausea:                             [ ]None[ ]Mild [ ]Moderate [ ]Severe   Loss of appetite:              [ ]None[ ]Mild [ ]Moderate [ ]Severe   Constipation:                    [ ]None[ ]Mild [ ]Moderate [ ]Severe    Other Symptoms:  [x ]All other review of systems negative     Palliative Performance Status Version 2:         %    http://npcrc.org/files/news/palliative_performance_scale_ppsv2.pdf    PHYSICAL EXAM:  Vital Signs Last 24 Hrs  T(C): 36.5 (28 Mar 2024 05:07), Max: 36.7 (27 Mar 2024 20:30)  T(F): 97.7 (28 Mar 2024 05:07), Max: 98 (27 Mar 2024 20:30)  HR: 94 (28 Mar 2024 05:07) (67 - 94)  BP: 161/70 (28 Mar 2024 05:07) (145/74 - 161/70)  BP(mean): --  RR: 18 (28 Mar 2024 05:07) (18 - 18)  SpO2: 98% (27 Mar 2024 19:35) (98% - 98%)    Parameters below as of 27 Mar 2024 19:35  Patient On (Oxygen Delivery Method): nasal cannula  O2 Flow (L/min): 2   I&O's Summary    27 Mar 2024 07:01  -  28 Mar 2024 07:00  --------------------------------------------------------  IN: 540 mL / OUT: 1250 mL / NET: -710 mL        GENERAL:  [ ] No acute distress [ ]Lethargic  [ ]Unarousable  [ ]Verbal  [ ]Non-Verbal [ ]Cachexia    BEHAVIORAL/PSYCH:  [ ]Alert and Oriented x  [ ] Anxiety [ ] Delirium [ ] Agitation [ ] Calm   EYES: [ ] No scleral icterus [ ] Scleral icterus [ ] Closed  ENMT:  [ ]Dry mouth  [ ]No external oral lesions [ ] No external ear or nose lesions  CARDIOVASCULAR:  [ ]Regular [ ]Irregular [ ]Tachy [ ]Not Tachy  [ ]Jacinto [ ] Edema [ ] No edema  PULMONARY:  [ ]Tachypnea  [ ]Audible excessive secretions [ ] No labored breathing [ ] labored breathing  GASTROINTESTINAL: [ ]Soft  [ ]Distended  [ ]Not distended [ ]Non tender [ ]Tender  MUSCULOSKELETAL: [ ]No clubbing [ ] clubbing  [ ] No cyanosis [ ] cyanosis  NEUROLOGIC: [ ]No focal deficits  [ ]Follows commands  [ ]Does not follow commands  [ ]Cognitive impairment  [ ]Dysphagia  [ ]Dysarthria  [ ]Paresis   SKIN: [ ] Jaundiced [ ] Non-jaundiced [ ]Rash [ ]No Rash [ ] Warm [ ] Dry  MISC/LINES: [ ] ET tube [ ] Trach [ ]NGT/OGT [ ]PEG [ ]Morgan    LABS: reviewed by me                        9.9    12.74 )-----------( 211      ( 28 Mar 2024 05:49 )             30.8   03-28    136  |  101  |  86<HH>  ----------------------------<  69<L>  4.9   |  20  |  6.2<HH>    Ca    8.3<L>      28 Mar 2024 05:49  Mg     1.8     03-28    TPro  4.9<L>  /  Alb  2.8<L>  /  TBili  0.3  /  DBili  <0.2  /  AST  30  /  ALT  15  /  AlkPhos  99  03-27      Urinalysis Basic - ( 28 Mar 2024 05:49 )    Color: x / Appearance: x / SG: x / pH: x  Gluc: 69 mg/dL / Ketone: x  / Bili: x / Urobili: x   Blood: x / Protein: x / Nitrite: x   Leuk Esterase: x / RBC: x / WBC x   Sq Epi: x / Non Sq Epi: x / Bacteria: x      RADIOLOGY & ADDITIONAL STUDIES: reviewed by me    EKG: reviewed by me      PROTEIN CALORIE MALNUTRITION PRESENT: [ ]mild [ ]moderate [ ]severe [ ]underweight [ ]morbid obesity  https://www.andeal.org/vault/2440/web/files/ONC/Table_Clinical%20Characteristics%20to%20Document%20Malnutrition-White%20JV%20et%20al%202012.pdf    Height (cm): 160 (03-23-24 @ 09:42)  Weight (kg): 57.1 (03-23-24 @ 09:42)  BMI (kg/m2): 22.3 (03-23-24 @ 09:42)  [ ]PPSV2 < or = to 30% [ ]significant weight loss  [ ]poor nutritional intake  [ ]anasarca      [ ]Artificial Nutrition      Palliative Care Spiritual/Emotional Screening Tool Question  Severity (0-4):  Score of 2 or greater indicates recommendation of Chaplaincy referral  Chaplaincy Referral: [ ] Yes [ ] Refused [ ] Following    Caregiver [place]:  [ ] Yes [ ] No [ ] Deferred  Social Work Referral [ ]  Patient and Family Centered Care Referral [ ]    Anticipatory Grief Present: [ ] Yes [ ] No [ ] Deferred  Social Work Referral [ ]  Patient and Family Centered Care Referral [ ]    Patient discussed with primary medical team MD  Palliative care education provided to patient and/or family

## 2024-03-28 NOTE — PROGRESS NOTE ADULT - SUBJECTIVE AND OBJECTIVE BOX
24H events:    Patient is a 88y old Female who presents with a chief complaint of AMS (28 Mar 2024 11:47)    Primary diagnosis of OMERO (acute kidney injury)    Today is 6d of hospitalization. This morning patient was seen and examined at bedside, resting comfortably in bed. Pt reports lower quadrant pain. Hemodynamically stable, tolerating oral diet, voiding appropriately with appropriate bowel movements.  No acute or major events overnight.    Code Status: Full Code    PAST MEDICAL & SURGICAL HISTORY  Chronic kidney disease    Hypertension    Gout    Diverticulitis  sigmoid    Rheumatoid arthritis    DVT, lower extremity  Bilateral    Pulmonary embolism    Chronic HFrEF (heart failure with reduced ejection fraction)    AICD (automatic cardioverter/defibrillator) present    Artificial pacemaker    History of appendectomy    History of tonsillectomy    History of hysterectomy    H/O hernia repair    SOCIAL HISTORY:  Social History:    ALLERGIES:  Keflex (Swelling)  cephalosporins (Angioedema)    MEDICATIONS:  STANDING MEDICATIONS  amLODIPine   Tablet 5 milliGRAM(s) Oral at bedtime  apixaban 2.5 milliGRAM(s) Oral every 12 hours  atorvastatin 80 milliGRAM(s) Oral at bedtime  calcium acetate 1334 milliGRAM(s) Oral every 12 hours  folic acid 1 milliGRAM(s) Oral daily  isosorbide   mononitrate ER Tablet (IMDUR) 30 milliGRAM(s) Oral daily  levoFLOXacin IVPB 500 milliGRAM(s) IV Intermittent every 48 hours  metoprolol succinate ER 50 milliGRAM(s) Oral daily  mirtazapine 7.5 milliGRAM(s) Oral daily  montelukast 10 milliGRAM(s) Oral daily  sertraline 25 milliGRAM(s) Oral daily  simethicone 80 milliGRAM(s) Chew daily  sodium bicarbonate 650 milliGRAM(s) Oral every 12 hours    PRN MEDICATIONS  acetaminophen     Tablet .. 650 milliGRAM(s) Oral every 6 hours PRN  albuterol    90 MICROgram(s) HFA Inhaler 2 Puff(s) Inhalation every 6 hours PRN    VITALS:   T(F): 97.7  HR: 94  BP: 161/70  RR: 18  SpO2: 99%    PHYSICAL EXAM:  GENERAL: Depressed affect  ( x) NAD, lying in bed comfortably     (  ) obtunded     (  ) lethargic     (  ) somnolent    HEAD:   ( x) Atraumatic     (  ) hematoma     (  ) laceration (specify location:       )     NECK:  (x) Supple     (  ) neck stiffness     (  ) nuchal rigidity     (  )  no JVD     (  ) JVD present ( -- cm)    HEART:  Rate -->     (x) normal rate     (  ) bradycardic     (  ) tachycardic  Rhythm -->     (x) regular     (  ) regularly irregular     (  ) irregularly irregular  Murmurs -->     (x) normal s1s2     (  ) systolic murmur     (  ) diastolic murmur     (  ) continuous murmur      (  ) S3 present     (  ) S4 present    LUNGS:   ( x)Unlabored respirations     (  ) tachypnea  ( x) B/L air entry     (  ) decreased breath sounds in:  (location     )    ( x) no adventitious sound     (  ) crackles     (  ) wheezing      (  ) rhonchi      (specify location:       )  (  ) chest wall tenderness (specify location:       )    ABDOMEN: No tenderness noted on palpation.  ( x) Soft     (  ) tense   |   (  ) nondistended     (  ) distended   |   (  ) +BS     (  ) hypoactive bowel sounds     (  ) hyperactive bowel sounds  ( x) nontender     (  ) RUQ tenderness     (  ) RLQ tenderness     (  ) LLQ tenderness     (  ) epigastric tenderness     (  ) diffuse tenderness  (  ) Splenomegaly      (  ) Hepatomegaly      (  ) Jaundice     (  ) ecchymosis     EXTREMITIES: No edema noted b/l  ( x) Normal     (  ) Rash     (  ) ecchymosis     (  ) varicose veins      (  ) pitting edema     (  ) non-pitting edema   (  ) ulceration     (  ) gangrene:     (location:     )    NERVOUS SYSTEM:    ( x) A&Ox3     (  ) confused     (  ) lethargic  CN II-XII:     ( x) Intact     (  ) deficits found     (Specify:     )   Upper extremities:     (  ) no sensorimotor deficits     (  ) weakness     (  ) loss of proprioception/vibration     (  ) loss of touch/temperature (specify:    )  Lower extremities:     (  ) no sensorimotor deficits     (  ) weakness     (  ) loss of proprioception/vibration     (  ) loss of touch/temperature (specify:    )    SKIN:   ( x ) No rashes or lesions     (  ) maculopapular rash     (  ) pustules     (  ) vesicles     (  ) ulcer     (  ) ecchymosis     (specify location:     )    LABS:                        9.9    12.74 )-----------( 211      ( 28 Mar 2024 05:49 )             30.8     03-28    136  |  101  |  86<HH>  ----------------------------<  69<L>  4.9   |  20  |  6.2<HH>    Ca    8.3<L>      28 Mar 2024 05:49  Mg     1.8     03-28    TPro  4.9<L>  /  Alb  2.8<L>  /  TBili  0.3  /  DBili  <0.2  /  AST  30  /  ALT  15  /  AlkPhos  99  03-27    Urinalysis Basic - ( 28 Mar 2024 05:49 )    Color: x / Appearance: x / SG: x / pH: x  Gluc: 69 mg/dL / Ketone: x  / Bili: x / Urobili: x   Blood: x / Protein: x / Nitrite: x   Leuk Esterase: x / RBC: x / WBC x   Sq Epi: x / Non Sq Epi: x / Bacteria: x    ABG - ( 26 Mar 2024 18:16 )  pH, Arterial: 7.47  pH, Blood: x     /  pCO2: 29    /  pO2: 70    / HCO3: 21    / Base Excess: -1.5  /  SaO2: 96.3      Culture - Blood (collected 27 Mar 2024 01:15)  Source: .Blood None  Preliminary Report (28 Mar 2024 07:01):    No growth at 24 hours    RADIOLOGY:    ASSESSMENT AND PLAN  RACHEL MARRUFO is a 89 yo woman with h/o htn, DVT/PE in 2018 and in 2019, HFrEF s/p aicd and ckd V who p/w AMS and abdominal pain after being discharged from osh ([facility]) on 3/11 to complete macrobid abx course and now p/w ams and confusion with OMERO on CKD5    #Metabolic encephalopathy, improved  #OMERO on CKD stage V--2/2 dehydration and poor oral intake  #Resolved Cystitis  #Diarrhea---Enteritis  #HFrEF -- s/p Aicd  #h/o DVT/PE  - tele monitoring  - continue to monitor bmp closely  - nephrology following---no acute indication for RRT at this time   - hold nephrotoxic meds  - closely monitor vs-----can hold additional bp meds if becomes hypotensive (both entresto and lasix on hold---restart as cr and bp tolerates)  - continue ivf 50cc/hr with hc03 -> DC fluids  - lokelma 10g q24h for elevated potassium  - check stool study pending collection  - eps interrogated device   - continue eliquis 2.5 mg bid  - f/u ua/ucr/ulytes -> wnl   - primafit to monitor and document I/o  - repeat UA neg  - repeat Blood cultures neg  - palliative care consulted for GOC  - GOC conversation continuing with HCP    #suspected pneumonia  #perihilar opacities  - CXR showing perihilar opacities in setting of overnight fever and tachycardia  - renally dose Levofloxacin 500mg q48h x 5 d  - increased WBC count    # HTN  amLODIPine   Tablet 5 milliGRAM(s) Oral at bedtime  isosorbide   mononitrate ER Tablet (IMDUR) 30 milliGRAM(s) Oral daily  metoprolol succinate ER 50 milliGRAM(s) Oral daily    # Hx of dvt/pe  - on apixaban 2.5 po bid    # HLD  atorvastatin 80 milliGRAM(s) Oral at bedtime     # Depression   - starting mirtazapine 7.5 milliGRAM(s) Oral daily for mood and appetite stimulation  - sertraline 25 milliGRAM(s) Oral daily     DVT/GI ppx  FULL CODE  dispo: tele  HCP: Leona Marrufo - [phone]  pend: TDC placement? must talk to family  24H events:    Patient is a 88y old Female who presents with a chief complaint of AMS (28 Mar 2024 11:47)    Primary diagnosis of OMERO (acute kidney injury)    Today is 6d of hospitalization. This morning patient was seen and examined at bedside, resting comfortably in bed. Pt reports lower quadrant pain. Hemodynamically stable, tolerating oral diet, voiding appropriately with appropriate bowel movements.  No acute or major events overnight.    Code Status: Full Code    PAST MEDICAL & SURGICAL HISTORY  Chronic kidney disease    Hypertension    Gout    Diverticulitis  sigmoid    Rheumatoid arthritis    DVT, lower extremity  Bilateral    Pulmonary embolism    Chronic HFrEF (heart failure with reduced ejection fraction)    AICD (automatic cardioverter/defibrillator) present    Artificial pacemaker    History of appendectomy    History of tonsillectomy    History of hysterectomy    H/O hernia repair    SOCIAL HISTORY:  Social History:    ALLERGIES:  Keflex (Swelling)  cephalosporins (Angioedema)    MEDICATIONS:  STANDING MEDICATIONS  amLODIPine   Tablet 5 milliGRAM(s) Oral at bedtime  apixaban 2.5 milliGRAM(s) Oral every 12 hours  atorvastatin 80 milliGRAM(s) Oral at bedtime  calcium acetate 1334 milliGRAM(s) Oral every 12 hours  folic acid 1 milliGRAM(s) Oral daily  isosorbide   mononitrate ER Tablet (IMDUR) 30 milliGRAM(s) Oral daily  levoFLOXacin IVPB 500 milliGRAM(s) IV Intermittent every 48 hours  metoprolol succinate ER 50 milliGRAM(s) Oral daily  mirtazapine 7.5 milliGRAM(s) Oral daily  montelukast 10 milliGRAM(s) Oral daily  sertraline 25 milliGRAM(s) Oral daily  simethicone 80 milliGRAM(s) Chew daily  sodium bicarbonate 650 milliGRAM(s) Oral every 12 hours    PRN MEDICATIONS  acetaminophen     Tablet .. 650 milliGRAM(s) Oral every 6 hours PRN  albuterol    90 MICROgram(s) HFA Inhaler 2 Puff(s) Inhalation every 6 hours PRN    VITALS:   T(F): 97.7  HR: 94  BP: 161/70  RR: 18  SpO2: 99%    PHYSICAL EXAM:  GENERAL: Depressed affect  ( x) NAD, lying in bed comfortably     (  ) obtunded     (  ) lethargic     (  ) somnolent    HEAD:   ( x) Atraumatic     (  ) hematoma     (  ) laceration (specify location:       )     NECK:  (x) Supple     (  ) neck stiffness     (  ) nuchal rigidity     (  )  no JVD     (  ) JVD present ( -- cm)    HEART:  Rate -->     (x) normal rate     (  ) bradycardic     (  ) tachycardic  Rhythm -->     (x) regular     (  ) regularly irregular     (  ) irregularly irregular  Murmurs -->     (x) normal s1s2     (  ) systolic murmur     (  ) diastolic murmur     (  ) continuous murmur      (  ) S3 present     (  ) S4 present    LUNGS:   ( x)Unlabored respirations     (  ) tachypnea  ( x) B/L air entry     (  ) decreased breath sounds in:  (location     )    ( x) no adventitious sound     (  ) crackles     (  ) wheezing      (  ) rhonchi      (specify location:       )  (  ) chest wall tenderness (specify location:       )    ABDOMEN: No tenderness noted on palpation.  ( x) Soft     (  ) tense   |   (  ) nondistended     (  ) distended   |   (  ) +BS     (  ) hypoactive bowel sounds     (  ) hyperactive bowel sounds  ( x) nontender     (  ) RUQ tenderness     (  ) RLQ tenderness     (  ) LLQ tenderness     (  ) epigastric tenderness     (  ) diffuse tenderness  (  ) Splenomegaly      (  ) Hepatomegaly      (  ) Jaundice     (  ) ecchymosis     EXTREMITIES: No edema noted b/l  ( x) Normal     (  ) Rash     (  ) ecchymosis     (  ) varicose veins      (  ) pitting edema     (  ) non-pitting edema   (  ) ulceration     (  ) gangrene:     (location:     )    NERVOUS SYSTEM:    ( x) A&Ox3     (  ) confused     (  ) lethargic  CN II-XII:     ( x) Intact     (  ) deficits found     (Specify:     )   Upper extremities:     (  ) no sensorimotor deficits     (  ) weakness     (  ) loss of proprioception/vibration     (  ) loss of touch/temperature (specify:    )  Lower extremities:     (  ) no sensorimotor deficits     (  ) weakness     (  ) loss of proprioception/vibration     (  ) loss of touch/temperature (specify:    )    SKIN:   ( x ) No rashes or lesions     (  ) maculopapular rash     (  ) pustules     (  ) vesicles     (  ) ulcer     (  ) ecchymosis     (specify location:     )    LABS:                        9.9    12.74 )-----------( 211      ( 28 Mar 2024 05:49 )             30.8     03-28    136  |  101  |  86<HH>  ----------------------------<  69<L>  4.9   |  20  |  6.2<HH>    Ca    8.3<L>      28 Mar 2024 05:49  Mg     1.8     03-28    TPro  4.9<L>  /  Alb  2.8<L>  /  TBili  0.3  /  DBili  <0.2  /  AST  30  /  ALT  15  /  AlkPhos  99  03-27    Urinalysis Basic - ( 28 Mar 2024 05:49 )    Color: x / Appearance: x / SG: x / pH: x  Gluc: 69 mg/dL / Ketone: x  / Bili: x / Urobili: x   Blood: x / Protein: x / Nitrite: x   Leuk Esterase: x / RBC: x / WBC x   Sq Epi: x / Non Sq Epi: x / Bacteria: x    ABG - ( 26 Mar 2024 18:16 )  pH, Arterial: 7.47  pH, Blood: x     /  pCO2: 29    /  pO2: 70    / HCO3: 21    / Base Excess: -1.5  /  SaO2: 96.3      Culture - Blood (collected 27 Mar 2024 01:15)  Source: .Blood None  Preliminary Report (28 Mar 2024 07:01):    No growth at 24 hours    RADIOLOGY:

## 2024-03-28 NOTE — PROGRESS NOTE ADULT - SUBJECTIVE AND OBJECTIVE BOX
INTERVAL HPI/OVERNIGHT EVENTS:    SUBJECTIVE: Patient seen and examined at bedside.     unable to obtain ros    OBJECTIVE:    VITAL SIGNS:  Vital Signs Last 24 Hrs  T(C): 37.1 (28 Mar 2024 12:25), Max: 37.1 (28 Mar 2024 12:25)  T(F): 98.7 (28 Mar 2024 12:25), Max: 98.7 (28 Mar 2024 12:25)  HR: 71 (28 Mar 2024 12:25) (71 - 94)  BP: 133/63 (28 Mar 2024 12:25) (133/63 - 161/70)  BP(mean): --  RR: 18 (28 Mar 2024 12:25) (18 - 18)  SpO2: 99% (28 Mar 2024 12:04) (96% - 99%)    Parameters below as of 28 Mar 2024 12:04  Patient On (Oxygen Delivery Method): nasal cannula  O2 Flow (L/min): 2        PHYSICAL EXAM:    General: NAD  HEENT: NC/AT; PERRL, clear conjunctiva  Neck: supple  Respiratory: CTA b/l  Cardiovascular: +S1/S2; RRR  Abdomen: soft, NT/ND; +BS x4  Extremities: WWP, 2+ peripheral pulses b/l; no LE edema  Skin: normal color and turgor; no rash  Neurological:    MEDICATIONS:  MEDICATIONS  (STANDING):  amLODIPine   Tablet 5 milliGRAM(s) Oral at bedtime  apixaban 2.5 milliGRAM(s) Oral every 12 hours  atorvastatin 80 milliGRAM(s) Oral at bedtime  calcitriol   Capsule 0.25 MICROGram(s) Oral daily  calcium acetate 1334 milliGRAM(s) Oral every 12 hours  folic acid 1 milliGRAM(s) Oral daily  isosorbide   mononitrate ER Tablet (IMDUR) 30 milliGRAM(s) Oral daily  levoFLOXacin IVPB 500 milliGRAM(s) IV Intermittent every 48 hours  metoprolol succinate ER 50 milliGRAM(s) Oral daily  mirtazapine 7.5 milliGRAM(s) Oral daily  montelukast 10 milliGRAM(s) Oral daily  sertraline 25 milliGRAM(s) Oral daily  simethicone 80 milliGRAM(s) Chew daily  sodium bicarbonate 650 milliGRAM(s) Oral every 12 hours    MEDICATIONS  (PRN):  acetaminophen     Tablet .. 650 milliGRAM(s) Oral every 6 hours PRN Mild Pain (1 - 3)  albuterol    90 MICROgram(s) HFA Inhaler 2 Puff(s) Inhalation every 6 hours PRN for shortness of breath and/or wheezing      ALLERGIES:  Allergies    Keflex (Swelling)  cephalosporins (Angioedema)    Intolerances        LABS:                        9.9    12.74 )-----------( 211      ( 28 Mar 2024 05:49 )             30.8     Hemoglobin: 9.9 g/dL (03-28 @ 05:49)  Hemoglobin: 8.8 g/dL (03-27 @ 06:19)  Hemoglobin: 9.3 g/dL (03-26 @ 06:47)  Hemoglobin: 9.1 g/dL (03-25 @ 06:36)  Hemoglobin: 9.5 g/dL (03-24 @ 20:00)    CBC Full  -  ( 28 Mar 2024 05:49 )  WBC Count : 12.74 K/uL  RBC Count : 3.53 M/uL  Hemoglobin : 9.9 g/dL  Hematocrit : 30.8 %  Platelet Count - Automated : 211 K/uL  Mean Cell Volume : 87.3 fL  Mean Cell Hemoglobin : 28.0 pg  Mean Cell Hemoglobin Concentration : 32.1 g/dL  Auto Neutrophil # : x  Auto Lymphocyte # : x  Auto Monocyte # : x  Auto Eosinophil # : x  Auto Basophil # : x  Auto Neutrophil % : x  Auto Lymphocyte % : x  Auto Monocyte % : x  Auto Eosinophil % : x  Auto Basophil % : x    03-28    136  |  101  |  86<HH>  ----------------------------<  69<L>  4.9   |  20  |  6.2<HH>    Ca    8.3<L>      28 Mar 2024 05:49  Mg     1.8     03-28    TPro  4.9<L>  /  Alb  2.8<L>  /  TBili  0.3  /  DBili  <0.2  /  AST  30  /  ALT  15  /  AlkPhos  99  03-27    Creatinine Trend: 6.2<--, 5.6<--, 5.8<--, 5.7<--, 5.4<--, 5.5<--  LIVER FUNCTIONS - ( 27 Mar 2024 15:55 )  Alb: 2.8 g/dL / Pro: 4.9 g/dL / ALK PHOS: 99 U/L / ALT: 15 U/L / AST: 30 U/L / GGT: x               hs Troponin:    ABG - ( 26 Mar 2024 18:16 )  pH, Arterial: 7.47  pH, Blood: x     /  pCO2: 29    /  pO2: 70    / HCO3: 21    / Base Excess: -1.5  /  SaO2: 96.3                    Urinalysis Basic - ( 28 Mar 2024 05:49 )    Color: x / Appearance: x / SG: x / pH: x  Gluc: 69 mg/dL / Ketone: x  / Bili: x / Urobili: x   Blood: x / Protein: x / Nitrite: x   Leuk Esterase: x / RBC: x / WBC x   Sq Epi: x / Non Sq Epi: x / Bacteria: x      CSF:                      EKG:   MICROBIOLOGY:    Culture - Blood (collected 27 Mar 2024 01:15)  Source: .Blood None  Preliminary Report (28 Mar 2024 07:01):    No growth at 24 hours      IMAGING:      Labs, imaging, EKG personally reviewed    RADIOLOGY & ADDITIONAL TESTS: Reviewed.

## 2024-03-28 NOTE — PROGRESS NOTE ADULT - ASSESSMENT
87yo  female patient (J[employer]ism) with hx of HTN, 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     see GOC note  pt complains of chronic back pain uses tylenol at home     medical resident made aware of above conversation 89yo  female patient ([employer]) with hx of HTN, 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     see GOC note  pt complains of chronic back pain uses tylenol at home   spoke to primary team and nurse at bedside, re families wishes     medical resident made aware of above conversation

## 2024-03-28 NOTE — PROGRESS NOTE ADULT - PROBLEM SELECTOR PROBLEM 1
Altered mental status
Acute kidney injury superimposed on CKD
Acute kidney injury superimposed on CKD

## 2024-03-28 NOTE — PROGRESS NOTE ADULT - PROBLEM SELECTOR PLAN 6
- starting mirtazapine 7.5 milliGRAM(s) Oral daily for mood and appetite stimulation - please d/c due to renal impairment   sertraline 25 milliGRAM(s) Oral daily
outpt f/u

## 2024-03-28 NOTE — CHART NOTE - NSCHARTNOTEFT_GEN_A_CORE
Electrophysiology PA Note     EP consulted for ICD deactivation  Chart reviewed. Palliative care note reviewed   " We discussed the AICD and turning it off so that it doesn't start firing when she is dying. She said she would want it turned off. "  Upon arrival, informed by nurse that family does not want ICD to be deactivated, was verbalized by family last night and this morning.  Will NOT proceed with deactivation at this time

## 2024-03-28 NOTE — PROGRESS NOTE ADULT - ASSESSMENT
81 yo  female patient (Judaism) with hx of CKD stage 5, HTN, DVT/PE 2018 (thought provoked by travel) completed 6m of AC then 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, NICM - HFrEF(LVEF 35-40% in 9/22) s/p AICD, RA, CKD stage 5. Currently on Macrobid for UTI (pt was discharge from [facility] 3/11 papers in her chart) presents to the ED for fluctuating mental status and abdominal pain with occasional CP.  Nephrology was consulted for OMERO over CKD stage 5.  # OMERO on CKD stage 5/ anemia chronic   Likely pre-renal etiology from poor oral intake due to AMS and loss of appetite   Cr noted trending up    continue sodium bicarbonate 650 q 12   sodium stable   phos at goal no need for binders   start vit D  calcitriol 0.25 mcg po q24h    no acute indication for RRT now but will need TDC and to start RRT soon / doubt will change her overall prognosis   palliative care notes appreciated   will follow

## 2024-03-28 NOTE — PROGRESS NOTE ADULT - PROBLEM SELECTOR PLAN 2
improved, monitor labs/nephrology following.
improved, monitor labs/nephrology following
calcitriol 0.25  bicarb 650 bid  phoslo 1334 bid  possible HD with renal  trend

## 2024-03-29 LAB
ANION GAP SERPL CALC-SCNC: 8 MMOL/L — SIGNIFICANT CHANGE UP (ref 7–14)
BUN SERPL-MCNC: 80 MG/DL — CRITICAL HIGH (ref 10–20)
CALCIUM SERPL-MCNC: 7.7 MG/DL — LOW (ref 8.4–10.5)
CHLORIDE SERPL-SCNC: 103 MMOL/L — SIGNIFICANT CHANGE UP (ref 98–110)
CO2 SERPL-SCNC: 25 MMOL/L — SIGNIFICANT CHANGE UP (ref 17–32)
CREAT SERPL-MCNC: 6 MG/DL — CRITICAL HIGH (ref 0.7–1.5)
EGFR: 6 ML/MIN/1.73M2 — LOW
GLUCOSE SERPL-MCNC: 78 MG/DL — SIGNIFICANT CHANGE UP (ref 70–99)
HAV IGM SER-ACNC: SIGNIFICANT CHANGE UP
HBV CORE IGM SER-ACNC: SIGNIFICANT CHANGE UP
HBV SURFACE AG SER-ACNC: SIGNIFICANT CHANGE UP
HCT VFR BLD CALC: 27.5 % — LOW (ref 37–47)
HCV AB S/CO SERPL IA: 0.16 S/CO — SIGNIFICANT CHANGE UP (ref 0–0.99)
HCV AB SERPL-IMP: SIGNIFICANT CHANGE UP
HGB BLD-MCNC: 8.7 G/DL — LOW (ref 12–16)
MAGNESIUM SERPL-MCNC: 1.9 MG/DL — SIGNIFICANT CHANGE UP (ref 1.8–2.4)
MCHC RBC-ENTMCNC: 27.9 PG — SIGNIFICANT CHANGE UP (ref 27–31)
MCHC RBC-ENTMCNC: 31.6 G/DL — LOW (ref 32–37)
MCV RBC AUTO: 88.1 FL — SIGNIFICANT CHANGE UP (ref 81–99)
NRBC # BLD: 0 /100 WBCS — SIGNIFICANT CHANGE UP (ref 0–0)
PLATELET # BLD AUTO: 178 K/UL — SIGNIFICANT CHANGE UP (ref 130–400)
PMV BLD: 9.4 FL — SIGNIFICANT CHANGE UP (ref 7.4–10.4)
POTASSIUM SERPL-MCNC: 4.3 MMOL/L — SIGNIFICANT CHANGE UP (ref 3.5–5)
POTASSIUM SERPL-SCNC: 4.3 MMOL/L — SIGNIFICANT CHANGE UP (ref 3.5–5)
RBC # BLD: 3.12 M/UL — LOW (ref 4.2–5.4)
RBC # FLD: 13.7 % — SIGNIFICANT CHANGE UP (ref 11.5–14.5)
SODIUM SERPL-SCNC: 136 MMOL/L — SIGNIFICANT CHANGE UP (ref 135–146)
WBC # BLD: 10.36 K/UL — SIGNIFICANT CHANGE UP (ref 4.8–10.8)
WBC # FLD AUTO: 10.36 K/UL — SIGNIFICANT CHANGE UP (ref 4.8–10.8)

## 2024-03-29 PROCEDURE — 99232 SBSQ HOSP IP/OBS MODERATE 35: CPT

## 2024-03-29 PROCEDURE — 74018 RADEX ABDOMEN 1 VIEW: CPT | Mod: 26

## 2024-03-29 RX ORDER — POLYETHYLENE GLYCOL 3350 17 G/17G
17 POWDER, FOR SOLUTION ORAL DAILY
Refills: 0 | Status: DISCONTINUED | OUTPATIENT
Start: 2024-03-29 | End: 2024-04-04

## 2024-03-29 RX ORDER — SENNA PLUS 8.6 MG/1
2 TABLET ORAL AT BEDTIME
Refills: 0 | Status: DISCONTINUED | OUTPATIENT
Start: 2024-03-29 | End: 2024-04-04

## 2024-03-29 RX ADMIN — Medication 50 MILLIGRAM(S): at 05:35

## 2024-03-29 RX ADMIN — Medication 1334 MILLIGRAM(S): at 17:52

## 2024-03-29 RX ADMIN — SENNA PLUS 2 TABLET(S): 8.6 TABLET ORAL at 22:04

## 2024-03-29 RX ADMIN — SIMETHICONE 80 MILLIGRAM(S): 80 TABLET, CHEWABLE ORAL at 11:25

## 2024-03-29 RX ADMIN — Medication 1 MILLIGRAM(S): at 11:25

## 2024-03-29 RX ADMIN — AMLODIPINE BESYLATE 5 MILLIGRAM(S): 2.5 TABLET ORAL at 22:03

## 2024-03-29 RX ADMIN — Medication 650 MILLIGRAM(S): at 22:04

## 2024-03-29 RX ADMIN — MONTELUKAST 10 MILLIGRAM(S): 4 TABLET, CHEWABLE ORAL at 11:26

## 2024-03-29 RX ADMIN — SERTRALINE 25 MILLIGRAM(S): 25 TABLET, FILM COATED ORAL at 11:26

## 2024-03-29 RX ADMIN — Medication 650 MILLIGRAM(S): at 17:52

## 2024-03-29 RX ADMIN — CALCITRIOL 0.25 MICROGRAM(S): 0.5 CAPSULE ORAL at 11:07

## 2024-03-29 RX ADMIN — ATORVASTATIN CALCIUM 80 MILLIGRAM(S): 80 TABLET, FILM COATED ORAL at 22:04

## 2024-03-29 RX ADMIN — Medication 650 MILLIGRAM(S): at 05:35

## 2024-03-29 RX ADMIN — MIRTAZAPINE 7.5 MILLIGRAM(S): 45 TABLET, ORALLY DISINTEGRATING ORAL at 11:32

## 2024-03-29 RX ADMIN — ISOSORBIDE MONONITRATE 30 MILLIGRAM(S): 60 TABLET, EXTENDED RELEASE ORAL at 11:08

## 2024-03-29 RX ADMIN — APIXABAN 2.5 MILLIGRAM(S): 2.5 TABLET, FILM COATED ORAL at 05:35

## 2024-03-29 RX ADMIN — Medication 1334 MILLIGRAM(S): at 05:35

## 2024-03-29 NOTE — PROGRESS NOTE ADULT - SUBJECTIVE AND OBJECTIVE BOX
24H events:    Patient is a 88y old Female who presents with a chief complaint of AMS (28 Mar 2024 15:42)    Primary diagnosis of OMERO (acute kidney injury)    Today is hospital day 7d. This morning patient was seen and examined at bedside, resting comfortably in bed.    No acute or major events overnight.    PAST MEDICAL & SURGICAL HISTORY  Chronic kidney disease    Hypertension    Gout    Diverticulitis  sigmoid    Rheumatoid arthritis    DVT, lower extremity  Bilateral    Pulmonary embolism    Chronic HFrEF (heart failure with reduced ejection fraction)    AICD (automatic cardioverter/defibrillator) present    Artificial pacemaker    History of appendectomy    History of tonsillectomy    History of hysterectomy    H/O hernia repair      SOCIAL HISTORY:  Social History:      ALLERGIES:  Keflex (Swelling)  cephalosporins (Angioedema)    MEDICATIONS:  STANDING MEDICATIONS  amLODIPine   Tablet 5 milliGRAM(s) Oral at bedtime  apixaban 2.5 milliGRAM(s) Oral every 12 hours  atorvastatin 80 milliGRAM(s) Oral at bedtime  calcitriol   Capsule 0.25 MICROGram(s) Oral daily  calcium acetate 1334 milliGRAM(s) Oral every 12 hours  folic acid 1 milliGRAM(s) Oral daily  isosorbide   mononitrate ER Tablet (IMDUR) 30 milliGRAM(s) Oral daily  levoFLOXacin IVPB 500 milliGRAM(s) IV Intermittent every 48 hours  metoprolol succinate ER 50 milliGRAM(s) Oral daily  mirtazapine 7.5 milliGRAM(s) Oral daily  montelukast 10 milliGRAM(s) Oral daily  sertraline 25 milliGRAM(s) Oral daily  simethicone 80 milliGRAM(s) Chew daily  sodium bicarbonate 650 milliGRAM(s) Oral every 12 hours    PRN MEDICATIONS  acetaminophen     Tablet .. 650 milliGRAM(s) Oral every 6 hours PRN  albuterol    90 MICROgram(s) HFA Inhaler 2 Puff(s) Inhalation every 6 hours PRN    VITALS:   T(F): 96.8  HR: 84  BP: 111/70  RR: 18  SpO2: 96%    PHYSICAL EXAM:    CONSTITUTIONAL: No acute distress.  HEAD: Normocephalic; atraumatic.  EYES: Pupils equal round reactive to light  NECK: Supple  CARD: Regular rate and rhythm. Normal S1, S2  RESP: Lungs clear to auscultation bilaterally. No wheezes  ABD: Abdomen soft; non-tender; non-distended  EXT: No clubbing, cyanosis or edema.   NEURO: Alert and oriented x 3    LABS:                        9.9    12.74 )-----------( 211      ( 28 Mar 2024 05:49 )             30.8     03-28    136  |  101  |  86<HH>  ----------------------------<  69<L>  4.9   |  20  |  6.2<HH>    Ca    8.3<L>      28 Mar 2024 05:49  Mg     1.8     03-28    TPro  4.9<L>  /  Alb  2.8<L>  /  TBili  0.3  /  DBili  <0.2  /  AST  30  /  ALT  15  /  AlkPhos  99  03-27      Urinalysis Basic - ( 28 Mar 2024 05:49 )    Color: x / Appearance: x / SG: x / pH: x  Gluc: 69 mg/dL / Ketone: x  / Bili: x / Urobili: x   Blood: x / Protein: x / Nitrite: x   Leuk Esterase: x / RBC: x / WBC x   Sq Epi: x / Non Sq Epi: x / Bacteria: x            Culture - Blood (collected 27 Mar 2024 06:19)  Source: .Blood None  Preliminary Report (28 Mar 2024 22:02):    No growth at 24 hours    Culture - Blood (collected 27 Mar 2024 01:15)  Source: .Blood None  Preliminary Report (29 Mar 2024 07:02):    No growth at 48 Hours          RADIOLOGY:           24H events:    Patient is a 88y old Female who presents with a chief complaint of AMS (28 Mar 2024 15:42)    Primary diagnosis of OMERO (acute kidney injury)    Today is hospital day 7d. This morning patient was seen and examined at bedside, resting comfortably in bed.    No acute or major events overnight.    PAST MEDICAL & SURGICAL HISTORY  Chronic kidney disease    Hypertension    Gout    Diverticulitis  sigmoid    Rheumatoid arthritis    DVT, lower extremity  Bilateral    Pulmonary embolism    Chronic HFrEF (heart failure with reduced ejection fraction)    AICD (automatic cardioverter/defibrillator) present    Artificial pacemaker    History of appendectomy    History of tonsillectomy    History of hysterectomy    H/O hernia repair      SOCIAL HISTORY:  Social History:      ALLERGIES:  Keflex (Swelling)  cephalosporins (Angioedema)    MEDICATIONS:  STANDING MEDICATIONS  amLODIPine   Tablet 5 milliGRAM(s) Oral at bedtime  apixaban 2.5 milliGRAM(s) Oral every 12 hours  atorvastatin 80 milliGRAM(s) Oral at bedtime  calcitriol   Capsule 0.25 MICROGram(s) Oral daily  calcium acetate 1334 milliGRAM(s) Oral every 12 hours  folic acid 1 milliGRAM(s) Oral daily  isosorbide   mononitrate ER Tablet (IMDUR) 30 milliGRAM(s) Oral daily  levoFLOXacin IVPB 500 milliGRAM(s) IV Intermittent every 48 hours  metoprolol succinate ER 50 milliGRAM(s) Oral daily  mirtazapine 7.5 milliGRAM(s) Oral daily  montelukast 10 milliGRAM(s) Oral daily  sertraline 25 milliGRAM(s) Oral daily  simethicone 80 milliGRAM(s) Chew daily  sodium bicarbonate 650 milliGRAM(s) Oral every 12 hours    PRN MEDICATIONS  acetaminophen     Tablet .. 650 milliGRAM(s) Oral every 6 hours PRN  albuterol    90 MICROgram(s) HFA Inhaler 2 Puff(s) Inhalation every 6 hours PRN    VITALS:   T(F): 96.8  HR: 84  BP: 111/70  RR: 18  SpO2: 96%    PHYSICAL EXAM:    CONSTITUTIONAL: No acute distress.  HEAD: Normocephalic; atraumatic.  EYES: Pupils equal round reactive to light  NECK: Supple  CARD: Regular rate and rhythm. Normal S1, S2  RESP: Lungs clear to auscultation bilaterally. No wheezes  ABD: Abdomen soft; non-tender; non-distended  EXT: No clubbing, cyanosis or edema.   NEURO: Alert and oriented x 3    LABS:                        9.9    12.74 )-----------( 211      ( 28 Mar 2024 05:49 )             30.8     03-28    136  |  101  |  86<HH>  ----------------------------<  69<L>  4.9   |  20  |  6.2<HH>    Ca    8.3<L>      28 Mar 2024 05:49  Mg     1.8     03-28    TPro  4.9<L>  /  Alb  2.8<L>  /  TBili  0.3  /  DBili  <0.2  /  AST  30  /  ALT  15  /  AlkPhos  99  03-27      Urinalysis Basic - ( 28 Mar 2024 05:49 )    Color: x / Appearance: x / SG: x / pH: x  Gluc: 69 mg/dL / Ketone: x  / Bili: x / Urobili: x   Blood: x / Protein: x / Nitrite: x   Leuk Esterase: x / RBC: x / WBC x   Sq Epi: x / Non Sq Epi: x / Bacteria: x      Culture - Blood (collected 27 Mar 2024 06:19)  Source: .Blood None  Preliminary Report (28 Mar 2024 22:02):    No growth at 24 hours    Culture - Blood (collected 27 Mar 2024 01:15)  Source: .Blood None  Preliminary Report (29 Mar 2024 07:02):    No growth at 48 Hours          RADIOLOGY:

## 2024-03-29 NOTE — CONSULT NOTE ADULT - SUBJECTIVE AND OBJECTIVE BOX
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  PAtient states she has lower abdominal pain with persistent dysuria. She says she had CP but doesn't remember when and how it was.  Per the ED note, patient's grandson said she is normally AAOx3 with good cognition but since her hospitalization she has not been herself. Tonight they tried to get her ready for bed and she was combative in trying to get her in her pajamas so he brought her to the ED.     ED vitals normal. labs showed hb 10 (at BL), Na 127, K 6.5 hemolyzed --> 4.9 on vbg, AG 16, BUN/CRea 119/4.8, , AST 53,   trop 74 --> 78, pro BNP 12.9K   EKG: NSR, vpaced, new TWI in inferolateral leads     CT abd pelv: 1. Prominent fluid-filled loops of small bowel with mild mucosal enhancement, may represent enteritis in the appropriate clinical setting.  2. The urinary bladder is thickened consistent with patient's current urinary tract infection.  Other chronic/incidental findings as above.  CTH: No acute intracranial pathology.    patient received levaquin and was admitted to telemetry for AMS and chest pain w/u and management.  (22 Mar 2024 04:02)      PAST MEDICAL & SURGICAL HISTORY:  Chronic kidney disease      Hypertension      Gout      Diverticulitis  sigmoid      Rheumatoid arthritis      DVT, lower extremity  Bilateral      Pulmonary embolism      Chronic HFrEF (heart failure with reduced ejection fraction)      AICD (automatic cardioverter/defibrillator) present      Artificial pacemaker      History of appendectomy      History of tonsillectomy      History of hysterectomy      H/O hernia repair          MEDICATIONS  (STANDING):  amLODIPine   Tablet 5 milliGRAM(s) Oral at bedtime  atorvastatin 80 milliGRAM(s) Oral at bedtime  calcitriol   Capsule 0.25 MICROGram(s) Oral daily  calcium acetate 1334 milliGRAM(s) Oral every 12 hours  folic acid 1 milliGRAM(s) Oral daily  isosorbide   mononitrate ER Tablet (IMDUR) 30 milliGRAM(s) Oral daily  metoprolol succinate ER 50 milliGRAM(s) Oral daily  mirtazapine 7.5 milliGRAM(s) Oral daily  montelukast 10 milliGRAM(s) Oral daily  polyethylene glycol 3350 17 Gram(s) Oral daily  senna 2 Tablet(s) Oral at bedtime  sertraline 25 milliGRAM(s) Oral daily  simethicone 80 milliGRAM(s) Chew daily  sodium bicarbonate 650 milliGRAM(s) Oral every 12 hours    MEDICATIONS  (PRN):  acetaminophen     Tablet .. 650 milliGRAM(s) Oral every 6 hours PRN Mild Pain (1 - 3)  albuterol    90 MICROgram(s) HFA Inhaler 2 Puff(s) Inhalation every 6 hours PRN for shortness of breath and/or wheezing      Allergies    Keflex (Swelling)  cephalosporins (Angioedema)    Intolerances          FAMILY HISTORY:  FH: arthritis  sister        Physical Exam:   Vital Signs Last 24 Hrs  T(C): 36.1 (29 Mar 2024 12:28), Max: 37.1 (28 Mar 2024 20:43)  T(F): 97 (29 Mar 2024 12:28), Max: 98.7 (28 Mar 2024 20:43)  HR: 69 (29 Mar 2024 12:28) (69 - 84)  BP: 132/61 (29 Mar 2024 12:28) (111/70 - 140/65)  BP(mean): --  RR: 18 (29 Mar 2024 12:28) (18 - 18)  SpO2: 96% (28 Mar 2024 20:43) (96% - 96%)    Parameters below as of 28 Mar 2024 20:43  Patient On (Oxygen Delivery Method): nasal cannula  O2 Flow (L/min): 2        Labs:                         8.7    10.36 )-----------( 178      ( 29 Mar 2024 06:33 )             27.5     03-29    136  |  103  |  80<HH>  ----------------------------<  78  4.3   |  25  |  6.0<HH>    Ca    7.7<L>      29 Mar 2024 06:33  Mg     1.9     03-29          Pertinent labs:                      8.7    10.36 )-----------( 178      ( 29 Mar 2024 06:33 )             27.5       03-29    136  |  103  |  80<HH>  ----------------------------<  78  4.3   |  25  |  6.0<HH>    Ca    7.7<L>      29 Mar 2024 06:33  Mg     1.9     03-29            Radiology & Additional Studies:     Radiology imaging reviewed.       ASSESSMENT AND PLAN:    87 yo woman with h/o htn, DVT/PE in 2018 and in 2019, HFrEF s/p aicd and ckd V who p/w AMS and abdominal pain after being discharged from osh ([facility]) on 3/11 to complete macrobid abx course and now p/w ams and confusion with OMERO on CKD5. IR consulted for TDC placement.    -[facility] TDC placement for 4/1 (Monday)  -NPO after midnight prior to procedure  -draw CBC/CMP/Coags prior to procedure  -hold anticoagulation/antiplatelets until after procedure (Eliquis for 6 doses prior with CrCl <30 and 4 doses prior with CrCl > 30)    Thank you for the courtesy of this consult, please call [phone] with any further questions.    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  PAtient states she has lower abdominal pain with persistent dysuria. She says she had CP but doesn't remember when and how it was.  Per the ED note, patient's grandson said she is normally AAOx3 with good cognition but since her hospitalization she has not been herself. Tonight they tried to get her ready for bed and she was combative in trying to get her in her pajamas so he brought her to the ED.     ED vitals normal. labs showed hb 10 (at BL), Na 127, K 6.5 hemolyzed --> 4.9 on vbg, AG 16, BUN/CRea 119/4.8, , AST 53,   trop 74 --> 78, pro BNP 12.9K   EKG: NSR, vpaced, new TWI in inferolateral leads     CT abd pelv: 1. Prominent fluid-filled loops of small bowel with mild mucosal enhancement, may represent enteritis in the appropriate clinical setting.  2. The urinary bladder is thickened consistent with patient's current urinary tract infection.  Other chronic/incidental findings as above.  CTH: No acute intracranial pathology.    patient received levaquin and was admitted to telemetry for AMS and chest pain w/u and management.  (22 Mar 2024 04:02)      PAST MEDICAL & SURGICAL HISTORY:  Chronic kidney disease      Hypertension      Gout      Diverticulitis  sigmoid      Rheumatoid arthritis      DVT, lower extremity  Bilateral      Pulmonary embolism      Chronic HFrEF (heart failure with reduced ejection fraction)      AICD (automatic cardioverter/defibrillator) present      Artificial pacemaker      History of appendectomy      History of tonsillectomy      History of hysterectomy      H/O hernia repair          MEDICATIONS  (STANDING):  amLODIPine   Tablet 5 milliGRAM(s) Oral at bedtime  atorvastatin 80 milliGRAM(s) Oral at bedtime  calcitriol   Capsule 0.25 MICROGram(s) Oral daily  calcium acetate 1334 milliGRAM(s) Oral every 12 hours  folic acid 1 milliGRAM(s) Oral daily  isosorbide   mononitrate ER Tablet (IMDUR) 30 milliGRAM(s) Oral daily  metoprolol succinate ER 50 milliGRAM(s) Oral daily  mirtazapine 7.5 milliGRAM(s) Oral daily  montelukast 10 milliGRAM(s) Oral daily  polyethylene glycol 3350 17 Gram(s) Oral daily  senna 2 Tablet(s) Oral at bedtime  sertraline 25 milliGRAM(s) Oral daily  simethicone 80 milliGRAM(s) Chew daily  sodium bicarbonate 650 milliGRAM(s) Oral every 12 hours    MEDICATIONS  (PRN):  acetaminophen     Tablet .. 650 milliGRAM(s) Oral every 6 hours PRN Mild Pain (1 - 3)  albuterol    90 MICROgram(s) HFA Inhaler 2 Puff(s) Inhalation every 6 hours PRN for shortness of breath and/or wheezing      Allergies    Keflex (Swelling)  cephalosporins (Angioedema)    Intolerances          FAMILY HISTORY:  FH: arthritis  sister        Physical Exam:   Vital Signs Last 24 Hrs  T(C): 36.1 (29 Mar 2024 12:28), Max: 37.1 (28 Mar 2024 20:43)  T(F): 97 (29 Mar 2024 12:28), Max: 98.7 (28 Mar 2024 20:43)  HR: 69 (29 Mar 2024 12:28) (69 - 84)  BP: 132/61 (29 Mar 2024 12:28) (111/70 - 140/65)  BP(mean): --  RR: 18 (29 Mar 2024 12:28) (18 - 18)  SpO2: 96% (28 Mar 2024 20:43) (96% - 96%)    Parameters below as of 28 Mar 2024 20:43  Patient On (Oxygen Delivery Method): nasal cannula  O2 Flow (L/min): 2        Labs:                         8.7    10.36 )-----------( 178      ( 29 Mar 2024 06:33 )             27.5     03-29    136  |  103  |  80<HH>  ----------------------------<  78  4.3   |  25  |  6.0<HH>    Ca    7.7<L>      29 Mar 2024 06:33  Mg     1.9     03-29          Pertinent labs:                      8.7    10.36 )-----------( 178      ( 29 Mar 2024 06:33 )             27.5       03-29    136  |  103  |  80<HH>  ----------------------------<  78  4.3   |  25  |  6.0<HH>    Ca    7.7<L>      29 Mar 2024 06:33  Mg     1.9     03-29            Radiology & Additional Studies:     Radiology imaging reviewed.       ASSESSMENT AND PLAN:    87 yo woman with h/o htn, DVT/PE in 2018 and in 2019, HFrEF s/p aicd and ckd V who p/w AMS and abdominal pain after being discharged from osh ([facility]) on 3/11 to complete macrobid abx course and now p/w ams and confusion with OMERO on CKD5. IR consulted for TDC placement.    -[facility] TDC placement for 4/1 (Monday)  -NPO after midnight prior to procedure  -draw CBC/CMP/Coags prior to procedure  -hold anticoagulation/antiplatelets until after procedure (Eliquis for 6 doses prior with CrCl <30 and 4 doses prior with CrCl > 30)  -discussed with primary team    Thank you for the courtesy of this consult, please call [phone] with any further questions.

## 2024-03-29 NOTE — PROGRESS NOTE ADULT - SUBJECTIVE AND OBJECTIVE BOX
MELINA RACHEL  [facility] 4A [id number] B ([facility])      Patient was evaluated and examined  by bedside, mild abdominal pain, no nausea, no vomiting, no fever       REVIEW OF SYSTEMS:  please see pertinent positives mentioned above, all other 12 ROS negative      T(C): , Max: 37.1 (03-28-24 @ 20:43)  HR: 69 (03-29-24 @ 12:28)  BP: 132/61 (03-29-24 @ 12:28)  RR: 18 (03-29-24 @ 12:28)  SpO2: 96% (03-28-24 @ 20:43)  CAPILLARY BLOOD GLUCOSE          PHYSICAL EXAM:  General: NAD, AAOX3, patient is laying comfortably in bed  HEENT: AT, NC, Supple, NO JVD, NO CB  Lungs: CTA B/L, no wheezing, no rhonchi  CVS: normal S1, S2, RRR, NO M/G/R  Abdomen: soft, bowel sounds present, non-tender, non-distended  Extremities: no edema, no clubbing, no cyanosis, positive peripheral pulses b/l  Neuro: no acute focal neurological deficits  Skin: no rash, no ecchymosis      LAB  CBC  Date: 03-29-24 @ 06:33  Mean cell Lyiajeljuu55.9  Mean cell Hemoglobin Conc31.6  Mean cell Volum 88.1  Platelet count-Automate 178  RBC Count 3.12  Red Cell Distrib Width13.7  WBC Count10.36  % Albumin, Urine--  Hematocrit 27.5  Hemoglobin 8.7  CBC  Date: 03-28-24 @ 05:49  Mean cell Abodauaivk45.0  Mean cell Hemoglobin Conc32.1  Mean cell Volum 87.3  Platelet count-Automate 211  RBC Count 3.53  Red Cell Distrib Width13.6  WBC Count12.74  % Albumin, Urine--  Hematocrit 30.8  Hemoglobin 9.9  CBC  Date: 03-27-24 @ 06:19  Mean cell Pxuvfcusjo54.8  Mean cell Hemoglobin Conc31.4  Mean cell Volum 88.6  Platelet count-Automate 222  RBC Count 3.16  Red Cell Distrib Width13.4  WBC Count9.34  % Albumin, Urine--  Hematocrit 28.0  Hemoglobin 8.8  CBC  Date: 03-26-24 @ 06:47  Mean cell Izwibjbeth40.4  Mean cell Hemoglobin Conc32.1  Mean cell Volum 88.4  Platelet count-Automate 253  RBC Count 3.28  Red Cell Distrib Width13.5  WBC Count6.71  % Albumin, Urine--  Hematocrit 29.0  Hemoglobin 9.3  CBC  Date: 03-25-24 @ 06:36  Mean cell Zzeehnshix75.6  Mean cell Hemoglobin Conc32.9  Mean cell Volum 87.1  Platelet count-Automate 291  RBC Count 3.18  Red Cell Distrib Width13.6  WBC Count7.33  % Albumin, Urine--  Hematocrit 27.7  Hemoglobin 9.1  CBC  Date: 03-24-24 @ 20:00  Mean cell Ajhooxxlhg63.3  Mean cell Hemoglobin Conc31.8  Mean cell Volum 89.0  Platelet count-Automate 305  RBC Count 3.36  Red Cell Distrib Width13.7  WBC Count7.36  % Albumin, Urine--  Hematocrit 29.9  Hemoglobin 9.5  CBC  Date: 03-23-24 @ 05:58  Mean cell Bdvbmcposc19.2  Mean cell Hemoglobin Conc32.9  Mean cell Volum 85.8  Platelet count-Automate 315  RBC Count 3.23  Red Cell Distrib Width13.4  WBC Count6.65  % Albumin, Urine--  Hematocrit 27.7  Hemoglobin 9.1    [facility]  03-29-24 @ 06:33  Blood Gas Arterial-Calcium,Ionized--  Blood Urea Nitrogen, Serum 80 mg/dL<HH> [10 - 20] [Critical value:]  Carbon Dioxide, Serum25 mmol/L [17 - 32]  Chloride, Rhmbq889 mmol/L [98 - 110]  Creatinie, Serum6.0 mg/dL<HH> [0.7 - 1.5] [Critical value:]  Glucose, Serum78 mg/dL [70 - 99]  Potassium, Serum4.3 mmol/L [3.5 - 5.0]  Sodium, Serum 136 mmol/L [135 - 146]  [facility]  03-28-24 @ 05:49  Blood Gas Arterial-Calcium,Ionized--  Blood Urea Nitrogen, Serum 86 mg/dL<HH> [10 - 20] [Critical value:]  Carbon Dioxide, Serum20 mmol/L [17 - 32]  Chloride, Uiwvj658 mmol/L [98 - 110]  Creatinie, Serum6.2 mg/dL<HH> [0.7 - 1.5] [Critical value:]  Glucose, Serum69 mg/dL<L> [70 - 99]  Potassium, Serum4.9 mmol/L [3.5 - 5.0]  Sodium, Serum 136 mmol/L [135 - 146]  [facility]  03-27-24 @ 06:19  Blood Gas Arterial-Calcium,Ionized--  Blood Urea Nitrogen, Serum 86 mg/dL<HH> [10 - 20] [Critical value:]  Carbon Dioxide, Serum22 mmol/L [17 - 32]  Chloride, Cycsw180 mmol/L [98 - 110]  Creatinie, Serum5.6 mg/dL<HH> [0.7 - 1.5] [Critical value:]  Glucose, Serum76 mg/dL [70 - 99]  Potassium, Serum4.8 mmol/L [3.5 - 5.0]  Sodium, Serum 135 mmol/L [135 - 146]  [facility]  03-26-24 @ 06:47  Blood Gas Arterial-Calcium,Ionized--  Blood Urea Nitrogen, Serum 88 mg/dL<HH> [10 - 20] [Critical value:]  Carbon Dioxide, Serum20 mmol/L [17 - 32]  Chloride, Serum99 mmol/L [98 - 110]  Creatinie, Serum5.8 mg/dL<HH> [0.7 - 1.5] [Critical value:]  Glucose, Serum83 mg/dL [70 - 99]  Potassium, Serum4.7 mmol/L [3.5 - 5.0]  Sodium, Serum 133 mmol/L<L> [135 - 146]  [facility]  03-26-24 @ 01:16  Blood Gas Arterial-Calcium,Ionized--  Blood Urea Nitrogen, Serum 89 mg/dL<HH> [10 - 20] [Critical value:]  Carbon Dioxide, Serum22 mmol/L [17 - 32]  Chloride, Serum98 mmol/L [98 - 110]  Creatinie, Serum5.7 mg/dL<HH> [0.7 - 1.5] [Critical value:]  Glucose, Serum82 mg/dL [70 - 99]  Potassium, Serum4.7 mmol/L [3.5 - 5.0]  Sodium, Serum 132 mmol/L<L> [135 - 146]  [facility]  03-25-24 @ 18:10  Blood Gas Arterial-Calcium,Ionized--  Blood Urea Nitrogen, Serum 90 mg/dL<HH> [10 - 20] [Critical value:]  Carbon Dioxide, Serum20 mmol/L [17 - 32]  Chloride, Serum99 mmol/L [98 - 110]  Creatinie, Serum5.4 mg/dL<HH> [0.7 - 1.5] [Critical value:]  Glucose, Serum98 mg/dL [70 - 99]  Potassium, Serum5.7 mmol/L<H> [3.5 - 5.0] [Hemolyzed. Interpret with caution]  Sodium, Serum 132 mmol/L<L> [135 - 146]  [facility]  03-25-24 @ 06:36  Blood Gas Arterial-Calcium,Ionized--  Blood Urea Nitrogen, Serum 96 mg/dL<HH> [10 - 20] [Critical value:]  Carbon Dioxide, Serum22 mmol/L [17 - 32]  Chloride, Serum98 mmol/L [98 - 110]  Creatinie, Serum5.5 mg/dL<HH> [0.7 - 1.5] [Critical value:]  Glucose, Serum80 mg/dL [70 - 99]  Potassium, Serum5.2 mmol/L<H> [3.5 - 5.0]  Sodium, Serum 132 mmol/L<L> [135 - 146]  [facility]  03-24-24 @ 20:00  Blood Gas Arterial-Calcium,Ionized--  Blood Urea Nitrogen, Serum 99 mg/dL<HH> [10 - 20] [Critical value:]  Carbon Dioxide, Serum23 mmol/L [17 - 32]  Chloride, Serum95 mmol/L<L> [98 - 110]  Creatinie, Serum5.6 mg/dL<HH> [0.7 - 1.5] [Critical value:]  Glucose, Onbru960 mg/dL<H> [70 - 99]  Potassium, Serum5.0 mmol/L [3.5 - 5.0]  Sodium, Serum 131 mmol/L<L> [135 - 146]              Microbiology:    Culture - Blood (collected 03-27-24 @ 06:19)  Source: .Blood None  Preliminary Report (03-28-24 @ 22:02):    No growth at 24 hours    Culture - Blood (collected 03-27-24 @ 01:15)  Source: .Blood None  Preliminary Report (03-29-24 @ 07:02):    No growth at 48 Hours    Culture - Urine (collected 03-22-24 @ 21:20)  Source: Clean Catch Clean Catch (Midstream)  Final Report (03-24-24 @ 07:05):    <10,000 CFU/mL Normal Urogenital Arturo    Culture - Blood (collected 03-21-24 @ 23:01)  Source: .Blood Blood  Final Report (03-27-24 @ 07:01):    No growth at 5 days    Culture - Urine (collected 03-21-24 @ 23:01)  Source: Clean Catch Clean Catch (Midstream)  Final Report (03-25-24 @ 08:23):    50,000 - 99,000 CFU/mL Escherichia coli    <10,000 CFU/ml Normal Urogenital arturo present  Organism: Escherichia coli (03-25-24 @ 08:23)  Organism: Escherichia coli (03-25-24 @ 08:23)      Method Type: YANY      -  Amoxicillin/Clavulanic Acid: S <=8/4      -  Ampicillin: S <=8 These ampicillin results predict results for amoxicillin      -  Ampicillin/Sulbactam: S <=4/2      -  Aztreonam: S <=4      -  Cefazolin: S <=2 For uncomplicated UTI with K. pneumoniae, E. coli, or P. mirablis: YANY <=16 is sensitive and YANY >=32 is resistant. This also predicts results for oral agents cefaclor, cefdinir, cefpodoxime, cefprozil, cefuroxime axetil, cephalexin and locarbef for uncomplicated UTI. Note that some isolates may be susceptible to these agents while testing resistant to cefazolin.      -  Cefepime: S <=2      -  Cefoxitin: S <=8      -  Ceftriaxone: S <=1      -  Cefuroxime: S <=4      -  Ciprofloxacin: S <=0.25      -  Ertapenem: S <=0.5      -  Gentamicin: S <=2      -  Imipenem: S <=1      -  Levofloxacin: S <=0.5      -  Meropenem: S <=1      -  Nitrofurantoin: S <=32 Should not be used to treat pyelonephritis      -  Piperacillin/Tazobactam: S <=8      -  Tobramycin: S <=2      -  Trimethoprim/Sulfamethoxazole: S <=0.5/9.5        Medications:  acetaminophen     Tablet .. 650 milliGRAM(s) Oral every 6 hours PRN  albuterol    90 MICROgram(s) HFA Inhaler 2 Puff(s) Inhalation every 6 hours PRN  amLODIPine   Tablet 5 milliGRAM(s) Oral at bedtime  apixaban 2.5 milliGRAM(s) Oral every 12 hours  atorvastatin 80 milliGRAM(s) Oral at bedtime  calcitriol   Capsule 0.25 MICROGram(s) Oral daily  calcium acetate 1334 milliGRAM(s) Oral every 12 hours  folic acid 1 milliGRAM(s) Oral daily  isosorbide   mononitrate ER Tablet (IMDUR) 30 milliGRAM(s) Oral daily  metoprolol succinate ER 50 milliGRAM(s) Oral daily  mirtazapine 7.5 milliGRAM(s) Oral daily  montelukast 10 milliGRAM(s) Oral daily  polyethylene glycol 3350 17 Gram(s) Oral daily  senna 2 Tablet(s) Oral at bedtime  sertraline 25 milliGRAM(s) Oral daily  simethicone 80 milliGRAM(s) Chew daily  sodium bicarbonate 650 milliGRAM(s) Oral every 12 hours        Assessment and Plan:  Patient is an 89 y/o Female with  PMHx HTN, h/o DVT/ PE 2018 on ac, HFrEF s/p AICD, RA, CKD V, recent uti here with altered mental status, progressive CKD, with prep for HD tx.       # Altered mental status due to acute metabolic encephalopathy; in setting of recent uti  +fever; possible gastroenteritis  cth neg  ct abd with bladder wall thickening, enteritis  ucxs  ecoli- pan sensitive, patient was given 1st dose of IV Levaquin on 3/22, 2nd dose on 3/27, 3rd dose on 3/29, 4th dose will receive on 3/31/24.   blood cxs negative        # OMEOR on  Chronic kidney disease (CKD), stage V. - worsening Creatinine level, started prep. for HD , f/up with Surgical team to place HD access , with possible HD tx. in am , f/up Nephrology team for rec.   - continue calcitriol 0.25, bicarb 650 bid, phoslo 1334 bid  - daily BMP, renal diet, strict I/O chart, avoid nephrotoxic agents     # HTN - continue daily  norvasc 5, imdur 30, toprol 50.    # DVT, lower extremity. on eliquis tx.       #  Chronic HFrEF (heart failure with reduced ejection fraction). clinically in euvolemic state  outpt Cardiology f/up     ## H/O rheumatoid arthritis.   ·  Plan: outpt f/u.    Code status: DNR/DNI     #Progress Note Handoff  Pending : complete abx. tx., f/up Surgical team to place HD access, with initiation of HD in 24 hours. daily CBC, BMP , Mg, Phos level   Family discussion: yes, medical team   Disposition: to be determined     Total time spent to complete patient's bedside assessment, review medical chart, discuss medical plan of care with covering medical team was more than 35 minutes with >50% of time spent face to face with patient, discussion with patient/family and/or coordination of care

## 2024-03-29 NOTE — PROGRESS NOTE ADULT - ASSESSMENT
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 Currently on Macrobid for UTI (pt was discharge from [facility] 3/11 papers in her chart) presents to the ED for fluctuating mental status and abdominal pain with occasional CP.  Nephrology was consulted for OMERO over CKD stage 5.  # OMERO on CKD stage 5/ anemia chronic   Likely pre-renal etiology from poor oral intake due to AMS and loss of appetite   Cr noted stable    continue sodium bicarbonate 650 q 12   sodium stable   phos at goal no need for binders  cont   calcitriol 0.25 mcg po q24h    no acute indication for RRT now but will need TDC '/ if inserted today will attempt HD in AM / if not can insert on Monday followed by first ttt at that time/ no need for U dall   palliative care notes appreciated   will follow

## 2024-03-29 NOTE — PROGRESS NOTE ADULT - ASSESSMENT
RACHEL MARRUFO is a 89 yo woman with h/o htn, DVT/PE in 2018 and in 2019, HFrEF s/p aicd and ckd V who p/w AMS and abdominal pain after being discharged from osh ([facility]) on 3/11 to complete macrobid abx course and now p/w ams and confusion with OMERO on CKD5    #Metabolic encephalopathy, improved  #OMERO on CKD stage V--2/2 dehydration and poor oral intake  #Resolved Cystitis  #Diarrhea---Enteritis  #HFrEF -- s/p Aicd  #h/o DVT/PE  - tele monitoring  - continue to monitor bmp closely  - nephrology following---no acute indication for RRT at this time   - hold nephrotoxic meds  - closely monitor vs-----can hold additional bp meds if becomes hypotensive (both entresto and lasix on hold---restart as cr and bp tolerates)  - continue ivf 50cc/hr with hc03 -> DC fluids  - lokelma 10g q24h for elevated potassium  - check stool study pending collection  - eps interrogated device   - continue eliquis 2.5 mg bid  - f/u ua/ucr/ulytes -> wnl   - primafit to monitor and document I/o  - repeat UA neg  - repeat Blood cultures neg  - palliative care consulted for GOC  - GOC conversation continuing with HCP -> HCP and patient want dialysis as per last palliative note    #suspected pneumonia  #perihilar opacities  - CXR showing perihilar opacities in setting of overnight fever and tachycardia  - renally dose Levofloxacin 500mg q48h x 5 days  - increased WBC count; trend WBC    # HTN  amLODIPine   Tablet 5 milliGRAM(s) Oral at bedtime  isosorbide   mononitrate ER Tablet (IMDUR) 30 milliGRAM(s) Oral daily  metoprolol succinate ER 50 milliGRAM(s) Oral daily    # Hx of dvt/pe  - on apixaban 2.5 po bid    # HLD  atorvastatin 80 milliGRAM(s) Oral at bedtime     # Depression   - starting mirtazapine 7.5 milliGRAM(s) Oral daily for mood and appetite stimulation  - sertraline 25 milliGRAM(s) Oral daily     DVT/GI ppx  FULL CODE  dispo: tele  HCP: Leona Marrufo - [phone]  pend: TDC placement?   RACHEL MARRUFO is a 87 yo woman with h/o htn, DVT/PE in 2018 and in 2019, HFrEF s/p aicd and ckd V who p/w AMS and abdominal pain after being discharged from osh ([facility]) on 3/11 to complete macrobid abx course and now p/w ams and confusion with OMERO on CKD5    #Metabolic encephalopathy, improved  #OMERO on CKD stage V--2/2 dehydration and poor oral intake  #Resolved Cystitis  #Diarrhea---Enteritis  #HFrEF -- s/p Aicd  #h/o DVT/PE  - tele monitoring  - continue to monitor bmp closely  - nephrology following---no acute indication for RRT now but will need TDC and to start RRT soon / doubt will change her overall prognosis,  start vit D  calcitriol 0.25 mcg po q24h   - hold nephrotoxic meds  - closely monitor vs-----can hold additional bp meds if becomes hypotensive (both entresto and lasix on hold---restart as cr and bp tolerates)  - continue ivf 50cc/hr with hc03 -> DC fluids  - lokelma 10g q24h for elevated potassium  - check stool study pending collection  - eps interrogated device   - continue eliquis 2.5 mg bid -> held on 3/29 for prospective TDC placement  - f/u ua/ucr/ulytes -> wnl   - primafit to monitor and document I/o  - repeat UA neg  - repeat Blood cultures neg  - palliative care consulted for GOC  - GOC conversation continuing with HCP -> HCP and patient want dialysis as per last palliative note  - IR consult on 3/29 for TDC placement   - Strike ins and outs    #suspected pneumonia  #perihilar opacities  - CXR showing perihilar opacities in setting of overnight fever and tachycardia  - renally dose Levofloxacin 500mg q48h x 5 days  - increased WBC count; trend WBC    # HTN  amLODIPine   Tablet 5 milliGRAM(s) Oral at bedtime  isosorbide   mononitrate ER Tablet (IMDUR) 30 milliGRAM(s) Oral daily  metoprolol succinate ER 50 milliGRAM(s) Oral daily    # Hx of dvt/pe  - on apixaban 2.5 po bid -> on hold for prospective TDC placement    # HLD  atorvastatin 80 milliGRAM(s) Oral at bedtime     # Depression   - starting mirtazapine 7.5 milliGRAM(s) Oral daily for mood and appetite stimulation  - sertraline 25 milliGRAM(s) Oral daily    #Diet: Pureed renal restricted   #DVT ppx: Eliquis on hold for prospective TDC procedure  #GI ppx: Not indicated  #Activity: IAT  #HCP: Leona Marrufo - [phone]    #Pending: TDC placement

## 2024-03-29 NOTE — PROGRESS NOTE ADULT - SUBJECTIVE AND OBJECTIVE BOX
Nephrology progress note    THIS IS AN INCOMPLETE NOTE . FULL NOTE TO FOLLOW SHORTLY    Patient is seen and examined, events over the last 24 h noted .    Allergies:  Keflex (Swelling)  cephalosporins (Angioedema)    Hospital Medications:   MEDICATIONS  (STANDING):  amLODIPine   Tablet 5 milliGRAM(s) Oral at bedtime  apixaban 2.5 milliGRAM(s) Oral every 12 hours  atorvastatin 80 milliGRAM(s) Oral at bedtime  calcitriol   Capsule 0.25 MICROGram(s) Oral daily  calcium acetate 1334 milliGRAM(s) Oral every 12 hours  folic acid 1 milliGRAM(s) Oral daily  isosorbide   mononitrate ER Tablet (IMDUR) 30 milliGRAM(s) Oral daily  levoFLOXacin IVPB 500 milliGRAM(s) IV Intermittent every 48 hours  metoprolol succinate ER 50 milliGRAM(s) Oral daily  mirtazapine 7.5 milliGRAM(s) Oral daily  montelukast 10 milliGRAM(s) Oral daily  polyethylene glycol 3350 17 Gram(s) Oral daily  senna 2 Tablet(s) Oral at bedtime  sertraline 25 milliGRAM(s) Oral daily  simethicone 80 milliGRAM(s) Chew daily  sodium bicarbonate 650 milliGRAM(s) Oral every 12 hours        VITALS:  T(F): 96.8 (03-29-24 @ 05:30), Max: 98.7 (03-28-24 @ 12:25)  HR: 84 (03-29-24 @ 05:30)  BP: 111/70 (03-29-24 @ 05:30)  RR: 18 (03-29-24 @ 05:30)  SpO2: 96% (03-28-24 @ 20:43)  Wt(kg): --    03-27 @ 07:01  -  03-28 @ 07:00  --------------------------------------------------------  IN: 540 mL / OUT: 1250 mL / NET: -710 mL    03-28 @ 07:01  -  03-29 @ 07:00  --------------------------------------------------------  IN: 384 mL / OUT: 500 mL / NET: -116 mL          PHYSICAL EXAM:  Constitutional: NAD  HEENT: anicteric sclera, oropharynx clear, MMM  Neck: No JVD  Respiratory: CTAB, no wheezes, rales or rhonchi  Cardiovascular: S1, S2, RRR  Gastrointestinal: BS+, soft, NT/ND  Extremities: No cyanosis or clubbing. No peripheral edema  :  No carlos.   Skin: No rashes    LABS:  03-28    136  |  101  |  86<HH>  ----------------------------<  69<L>  4.9   |  20  |  6.2<HH>    Ca    8.3<L>      28 Mar 2024 05:49  Mg     1.8     03-28    TPro  4.9<L>  /  Alb  2.8<L>  /  TBili  0.3  /  DBili  <0.2  /  AST  30  /  ALT  15  /  AlkPhos  99  03-27                          8.7    10.36 )-----------( 178      ( 29 Mar 2024 06:33 )             27.5       Urine Studies:  Urinalysis Basic - ( 28 Mar 2024 05:49 )    Color:  / Appearance:  / SG:  / pH:   Gluc: 69 mg/dL / Ketone:   / Bili:  / Urobili:    Blood:  / Protein:  / Nitrite:    Leuk Esterase:  / RBC:  / WBC    Sq Epi:  / Non Sq Epi:  / Bacteria:       Sodium, Random Urine: 36.0 mmoL/L (03-23 @ 21:23)  Creatinine, Random Urine: 45 mg/dL (03-23 @ 21:23)  Protein/Creatinine Ratio Calculation: 2.4 Ratio (03-23 @ 21:23)  Osmolality, Random Urine: 273 mos/kg (03-23 @ 21:23)  Potassium, Random Urine: 16 mmol/L (03-23 @ 21:23)  Sodium, Random Urine: 47.0 mmoL/L (03-22 @ 21:20)  Creatinine, Random Urine: 42 mg/dL (03-22 @ 21:20)  Protein/Creatinine Ratio Calculation: 1.7 Ratio (03-22 @ 21:20)  Osmolality, Random Urine: 301 mos/kg (03-22 @ 21:20)  Potassium, Random Urine: 13 mmol/L (03-22 @ 21:20)      PTH -- (Ca 8.2)      [03-24-24 @ 20:00]   138  Vitamin D (25OH) 9      [03-24-24 @ 20:00]  Lipid: chol 144, , HDL 39, LDL --      [03-22-24 @ 06:14]    HBsAb Nonreact      [03-27-24 @ 15:55]  HBsAg Nonreact      [03-28-24 @ 05:49]  HCV 0.16, Nonreact      [03-28-24 @ 05:49]    OSIEL: titer 1:160, pattern [id number]      [04-27-22 @ 16:00]  Rheumatoid Factor 31      [07-18-22 @ 05:21]  Free Light Chains: kappa 13.15, lambda 10.99, ratio = 1.20      [12-21 @ 07:39]  Immunofixation Serum:   No Monoclonal Band Identified    Reference Range: None Detected      [12-20-22 @ 10:57]  SPEP Interpretation: Normal Electrophoresis Pattern      [12-20-22 @ 10:57]  Immunofixation Urine: Reference Range: None Detected      [12-25-20 @ 12:35]  UPEP Interpretation: Mild Selective (Glomerular) Proteinuria      [12-25-20 @ 12:35]      RADIOLOGY & ADDITIONAL STUDIES:   Nephrology progress note    Patient is seen and examined, events over the last 24 h noted .  lying in bed comfortable     Allergies:  Keflex (Swelling)  cephalosporins (Angioedema)    Hospital Medications:   MEDICATIONS  (STANDING):  amLODIPine   Tablet 5 milliGRAM(s) Oral at bedtime  apixaban 2.5 milliGRAM(s) Oral every 12 hours  atorvastatin 80 milliGRAM(s) Oral at bedtime  calcitriol   Capsule 0.25 MICROGram(s) Oral daily  calcium acetate 1334 milliGRAM(s) Oral every 12 hours  folic acid 1 milliGRAM(s) Oral daily  isosorbide   mononitrate ER Tablet (IMDUR) 30 milliGRAM(s) Oral daily  levoFLOXacin IVPB 500 milliGRAM(s) IV Intermittent every 48 hours  metoprolol succinate ER 50 milliGRAM(s) Oral daily  mirtazapine 7.5 milliGRAM(s) Oral daily  montelukast 10 milliGRAM(s) Oral daily  polyethylene glycol 3350 17 Gram(s) Oral daily  senna 2 Tablet(s) Oral at bedtime  sertraline 25 milliGRAM(s) Oral daily  simethicone 80 milliGRAM(s) Chew daily  sodium bicarbonate 650 milliGRAM(s) Oral every 12 hours        VITALS:  T(F): 96.8 (03-29-24 @ 05:30), Max: 98.7 (03-28-24 @ 12:25)  HR: 84 (03-29-24 @ 05:30)  BP: 111/70 (03-29-24 @ 05:30)  RR: 18 (03-29-24 @ 05:30)  SpO2: 96% (03-28-24 @ 20:43)      03-27 @ 07:01  -  03-28 @ 07:00  --------------------------------------------------------  IN: 540 mL / OUT: 1250 mL / NET: -710 mL    03-28 @ 07:01  -  03-29 @ 07:00  --------------------------------------------------------  IN: 384 mL / OUT: 500 mL / NET: -116 mL          PHYSICAL EXAM:  Constitutional: cachectic   Respiratory: CTAB, no wheezes, rales or rhonchi  Cardiovascular: S1, S2, RRR  Gastrointestinal: BS+, soft, NT/ND  Extremities: No cyanosis or clubbing. No peripheral edema  :  No carlos.   Skin: No rashes    LABS:  03-29    136  |  103  |  80<HH>  ----------------------------<  78  4.3   |  25  |  6.0<HH>    Ca    7.7<L>      29 Mar 2024 06:33  Mg     1.9     03-29    TPro  4.9<L>  /  Alb  2.8<L>  /  TBili  0.3  /  DBili  <0.2  /  AST  30  /  ALT  15  /  AlkPhos  99  03-27 03-28    136  |  101  |  86<HH>  ----------------------------<  69<L>  4.9   |  20  |  6.2<HH>    Ca    8.3<L>      28 Mar 2024 05:49  Mg     1.8     03-28    TPro  4.9<L>  /  Alb  2.8<L>  /  TBili  0.3  /  DBili  <0.2  /  AST  30  /  ALT  15  /  AlkPhos  99  03-27                          8.7    10.36 )-----------( 178      ( 29 Mar 2024 06:33 )             27.5       Urine Studies:  Urinalysis Basic - ( 28 Mar 2024 05:49 )    Color:  / Appearance:  / SG:  / pH:   Gluc: 69 mg/dL / Ketone:   / Bili:  / Urobili:    Blood:  / Protein:  / Nitrite:    Leuk Esterase:  / RBC:  / WBC    Sq Epi:  / Non Sq Epi:  / Bacteria:       Sodium, Random Urine: 36.0 mmoL/L (03-23 @ 21:23)  Creatinine, Random Urine: 45 mg/dL (03-23 @ 21:23)  Protein/Creatinine Ratio Calculation: 2.4 Ratio (03-23 @ 21:23)  Osmolality, Random Urine: 273 mos/kg (03-23 @ 21:23)  Potassium, Random Urine: 16 mmol/L (03-23 @ 21:23)  Sodium, Random Urine: 47.0 mmoL/L (03-22 @ 21:20)  Creatinine, Random Urine: 42 mg/dL (03-22 @ 21:20)  Protein/Creatinine Ratio Calculation: 1.7 Ratio (03-22 @ 21:20)  Osmolality, Random Urine: 301 mos/kg (03-22 @ 21:20)  Potassium, Random Urine: 13 mmol/L (03-22 @ 21:20)      PTH -- (Ca 8.2)      [03-24-24 @ 20:00]   138  Vitamin D (25OH) 9      [03-24-24 @ 20:00]  Lipid: chol 144, , HDL 39, LDL --      [03-22-24 @ 06:14]    HBsAb Nonreact      [03-27-24 @ 15:55]  HBsAg Nonreact      [03-28-24 @ 05:49]  HCV 0.16, Nonreact      [03-28-24 @ 05:49]    OSIEL: titer 1:160, pattern [id number]      [04-27-22 @ 16:00]  Rheumatoid Factor 31      [07-18-22 @ 05:21]  Free Light Chains: kappa 13.15, lambda 10.99, ratio = 1.20      [12-21 @ 07:39]  Immunofixation Serum:   No Monoclonal Band Identified    Reference Range: None Detected      [12-20-22 @ 10:57]  SPEP Interpretation: Normal Electrophoresis Pattern      [12-20-22 @ 10:57]  Immunofixation Urine: Reference Range: None Detected      [12-25-20 @ 12:35]  UPEP Interpretation: Mild Selective (Glomerular) Proteinuria      [12-25-20 @ 12:35]      RADIOLOGY & ADDITIONAL STUDIES:

## 2024-03-30 LAB
ALBUMIN SERPL ELPH-MCNC: 3 G/DL — LOW (ref 3.5–5.2)
ALP SERPL-CCNC: 102 U/L — SIGNIFICANT CHANGE UP (ref 30–115)
ALT FLD-CCNC: 15 U/L — SIGNIFICANT CHANGE UP (ref 0–41)
ANION GAP SERPL CALC-SCNC: 13 MMOL/L — SIGNIFICANT CHANGE UP (ref 7–14)
APTT BLD: 38.4 SEC — SIGNIFICANT CHANGE UP (ref 27–39.2)
AST SERPL-CCNC: 31 U/L — SIGNIFICANT CHANGE UP (ref 0–41)
BASOPHILS # BLD AUTO: 0.04 K/UL — SIGNIFICANT CHANGE UP (ref 0–0.2)
BASOPHILS NFR BLD AUTO: 0.4 % — SIGNIFICANT CHANGE UP (ref 0–1)
BILIRUB SERPL-MCNC: 0.4 MG/DL — SIGNIFICANT CHANGE UP (ref 0.2–1.2)
BUN SERPL-MCNC: 79 MG/DL — CRITICAL HIGH (ref 10–20)
CALCIUM SERPL-MCNC: 8.1 MG/DL — LOW (ref 8.4–10.5)
CHLORIDE SERPL-SCNC: 104 MMOL/L — SIGNIFICANT CHANGE UP (ref 98–110)
CO2 SERPL-SCNC: 23 MMOL/L — SIGNIFICANT CHANGE UP (ref 17–32)
CREAT SERPL-MCNC: 6.4 MG/DL — CRITICAL HIGH (ref 0.7–1.5)
EGFR: 6 ML/MIN/1.73M2 — LOW
EOSINOPHIL # BLD AUTO: 0.49 K/UL — SIGNIFICANT CHANGE UP (ref 0–0.7)
EOSINOPHIL NFR BLD AUTO: 5 % — SIGNIFICANT CHANGE UP (ref 0–8)
GLUCOSE SERPL-MCNC: 84 MG/DL — SIGNIFICANT CHANGE UP (ref 70–99)
HCT VFR BLD CALC: 28.8 % — LOW (ref 37–47)
HGB BLD-MCNC: 9.2 G/DL — LOW (ref 12–16)
IMM GRANULOCYTES NFR BLD AUTO: 0.4 % — HIGH (ref 0.1–0.3)
INR BLD: 1.61 RATIO — HIGH (ref 0.65–1.3)
LYMPHOCYTES # BLD AUTO: 1.07 K/UL — LOW (ref 1.2–3.4)
LYMPHOCYTES # BLD AUTO: 10.9 % — LOW (ref 20.5–51.1)
MAGNESIUM SERPL-MCNC: 1.9 MG/DL — SIGNIFICANT CHANGE UP (ref 1.8–2.4)
MCHC RBC-ENTMCNC: 28.2 PG — SIGNIFICANT CHANGE UP (ref 27–31)
MCHC RBC-ENTMCNC: 31.9 G/DL — LOW (ref 32–37)
MCV RBC AUTO: 88.3 FL — SIGNIFICANT CHANGE UP (ref 81–99)
MONOCYTES # BLD AUTO: 1.08 K/UL — HIGH (ref 0.1–0.6)
MONOCYTES NFR BLD AUTO: 11 % — HIGH (ref 1.7–9.3)
NEUTROPHILS # BLD AUTO: 7.13 K/UL — HIGH (ref 1.4–6.5)
NEUTROPHILS NFR BLD AUTO: 72.3 % — SIGNIFICANT CHANGE UP (ref 42.2–75.2)
NRBC # BLD: 0 /100 WBCS — SIGNIFICANT CHANGE UP (ref 0–0)
PLATELET # BLD AUTO: 190 K/UL — SIGNIFICANT CHANGE UP (ref 130–400)
PMV BLD: 9.8 FL — SIGNIFICANT CHANGE UP (ref 7.4–10.4)
POTASSIUM SERPL-MCNC: 4.5 MMOL/L — SIGNIFICANT CHANGE UP (ref 3.5–5)
POTASSIUM SERPL-SCNC: 4.5 MMOL/L — SIGNIFICANT CHANGE UP (ref 3.5–5)
PROCALCITONIN SERPL-MCNC: 0.7 NG/ML — HIGH (ref 0.02–0.1)
PROT SERPL-MCNC: 5.4 G/DL — LOW (ref 6–8)
PROTHROM AB SERPL-ACNC: 18.4 SEC — HIGH (ref 9.95–12.87)
RBC # BLD: 3.26 M/UL — LOW (ref 4.2–5.4)
RBC # FLD: 13.7 % — SIGNIFICANT CHANGE UP (ref 11.5–14.5)
SODIUM SERPL-SCNC: 140 MMOL/L — SIGNIFICANT CHANGE UP (ref 135–146)
WBC # BLD: 9.85 K/UL — SIGNIFICANT CHANGE UP (ref 4.8–10.8)
WBC # FLD AUTO: 9.85 K/UL — SIGNIFICANT CHANGE UP (ref 4.8–10.8)

## 2024-03-30 PROCEDURE — 99232 SBSQ HOSP IP/OBS MODERATE 35: CPT

## 2024-03-30 RX ORDER — CHOLECALCIFEROL (VITAMIN D3) 125 MCG
1000 CAPSULE ORAL DAILY
Refills: 0 | Status: DISCONTINUED | OUTPATIENT
Start: 2024-03-30 | End: 2024-04-03

## 2024-03-30 RX ORDER — TRAMADOL HYDROCHLORIDE 50 MG/1
25 TABLET ORAL EVERY 8 HOURS
Refills: 0 | Status: DISCONTINUED | OUTPATIENT
Start: 2024-03-30 | End: 2024-04-01

## 2024-03-30 RX ORDER — PANTOPRAZOLE SODIUM 20 MG/1
40 TABLET, DELAYED RELEASE ORAL ONCE
Refills: 0 | Status: COMPLETED | OUTPATIENT
Start: 2024-03-30 | End: 2024-03-30

## 2024-03-30 RX ORDER — ONDANSETRON 8 MG/1
4 TABLET, FILM COATED ORAL EVERY 6 HOURS
Refills: 0 | Status: DISCONTINUED | OUTPATIENT
Start: 2024-03-30 | End: 2024-04-06

## 2024-03-30 RX ORDER — MAGNESIUM SULFATE 500 MG/ML
1 VIAL (ML) INJECTION ONCE
Refills: 0 | Status: DISCONTINUED | OUTPATIENT
Start: 2024-03-30 | End: 2024-03-30

## 2024-03-30 RX ORDER — SIMETHICONE 80 MG/1
80 TABLET, CHEWABLE ORAL EVERY 6 HOURS
Refills: 0 | Status: DISCONTINUED | OUTPATIENT
Start: 2024-03-30 | End: 2024-04-06

## 2024-03-30 RX ADMIN — Medication 1 MILLIGRAM(S): at 12:25

## 2024-03-30 RX ADMIN — SENNA PLUS 2 TABLET(S): 8.6 TABLET ORAL at 22:18

## 2024-03-30 RX ADMIN — ATORVASTATIN CALCIUM 80 MILLIGRAM(S): 80 TABLET, FILM COATED ORAL at 22:19

## 2024-03-30 RX ADMIN — PANTOPRAZOLE SODIUM 40 MILLIGRAM(S): 20 TABLET, DELAYED RELEASE ORAL at 14:32

## 2024-03-30 RX ADMIN — MONTELUKAST 10 MILLIGRAM(S): 4 TABLET, CHEWABLE ORAL at 12:25

## 2024-03-30 RX ADMIN — Medication 1334 MILLIGRAM(S): at 06:31

## 2024-03-30 RX ADMIN — SIMETHICONE 80 MILLIGRAM(S): 80 TABLET, CHEWABLE ORAL at 12:43

## 2024-03-30 RX ADMIN — MIRTAZAPINE 7.5 MILLIGRAM(S): 45 TABLET, ORALLY DISINTEGRATING ORAL at 12:25

## 2024-03-30 RX ADMIN — Medication 10 MILLIGRAM(S): at 22:22

## 2024-03-30 RX ADMIN — SERTRALINE 25 MILLIGRAM(S): 25 TABLET, FILM COATED ORAL at 12:25

## 2024-03-30 RX ADMIN — ISOSORBIDE MONONITRATE 30 MILLIGRAM(S): 60 TABLET, EXTENDED RELEASE ORAL at 12:26

## 2024-03-30 RX ADMIN — AMLODIPINE BESYLATE 5 MILLIGRAM(S): 2.5 TABLET ORAL at 22:19

## 2024-03-30 RX ADMIN — CALCITRIOL 0.25 MICROGRAM(S): 0.5 CAPSULE ORAL at 12:26

## 2024-03-30 RX ADMIN — Medication 650 MILLIGRAM(S): at 06:31

## 2024-03-30 RX ADMIN — POLYETHYLENE GLYCOL 3350 17 GRAM(S): 17 POWDER, FOR SOLUTION ORAL at 12:25

## 2024-03-30 RX ADMIN — ONDANSETRON 4 MILLIGRAM(S): 8 TABLET, FILM COATED ORAL at 12:46

## 2024-03-30 RX ADMIN — TRAMADOL HYDROCHLORIDE 25 MILLIGRAM(S): 50 TABLET ORAL at 13:57

## 2024-03-30 RX ADMIN — Medication 50 MILLIGRAM(S): at 06:31

## 2024-03-30 RX ADMIN — TRAMADOL HYDROCHLORIDE 25 MILLIGRAM(S): 50 TABLET ORAL at 15:44

## 2024-03-30 NOTE — PROGRESS NOTE ADULT - ASSESSMENT
RACHEL MARRUFO is a 87 yo woman with h/o htn, DVT/PE in 2018 and in 2019, HFrEF s/p aicd and ckd V who p/w AMS and abdominal pain after being discharged from osh ([facility]) on 3/11 to complete macrobid abx course and now p/w ams and confusion with OMERO on CKD5    #Metabolic encephalopathy, improved  #OMERO on CKD stage V--2/2 dehydration and poor oral intake  #Resolved Cystitis  #Diarrhea---Enteritis  #HFrEF -- s/p Aicd  #h/o DVT/PE  - tele monitoring  - continue to monitor bmp closely  - nephrology following---no acute indication for RRT now but will need TDC and to start RRT soon / doubt will change her overall prognosis,    - started vit D  calcitriol 0.25 mcg po q24h   - hold nephrotoxic meds  - closely monitor vs-----can hold additional bp meds if becomes hypotensive (both entresto and lasix on hold---restart as cr and bp tolerates)  - lokelma 10g q24h for elevated potassium  - check stool study pending collection  - eps interrogated device   - continue eliquis 2.5 mg bid -> held on 3/29 for prospective TDC placement  - f/u ua/ucr/ulytes -> wnl   - primafit to monitor and document I/o  - repeat UA neg  - repeat Blood cultures neg  - palliative care GOC conversation continuing with HCP -> HCP and patient want dialysis as per last palliative note  - IR: TDC placement   - Strike ins and outs    #suspected pneumonia  #perihilar opacities  - CXR showing perihilar opacities in setting of overnight fever and tachycardia  - renally dose Levofloxacin 500mg q48h x 5 days  - increased WBC count; trend WBC    # HTN  amLODIPine   Tablet 5 milliGRAM(s) Oral at bedtime  isosorbide   mononitrate ER Tablet (IMDUR) 30 milliGRAM(s) Oral daily  metoprolol succinate ER 50 milliGRAM(s) Oral daily    # Hx of dvt/pe  - on apixaban 2.5 po bid -> on hold for prospective TDC placement    # HLD  atorvastatin 80 milliGRAM(s) Oral at bedtime     # Depression   - strated mirtazapine 7.5 milliGRAM(s) Oral daily for mood and appetite stimulation  - sertraline 25 milliGRAM(s) Oral daily    #Diet: Pureed renal restricted   #DVT ppx: Eliquis on hold for prospective TDC procedure  #GI ppx: Not indicated  #Activity: IAT  #HCP: Leona Marrufo - [phone]    #Pending: TDC placement on Mon, preop labs/npo ordered

## 2024-03-30 NOTE — PROGRESS NOTE ADULT - SUBJECTIVE AND OBJECTIVE BOX
MELINA RACHEL  [facility]-N 4A 022 B (SI-N 4A)      Patient was evaluated and examined  by bedside, c/o mild abdominal pain and nausea, no vomiting, no diarrhea, no fever , no cough, no dyspnea      REVIEW OF SYSTEMS: please see pertinent positives mentioned above, all other 12 ROS negative    ICU Vital Signs Last 24 Hrs  T(C): --  T(F): --  HR: --  BP: --  BP(mean): --  ABP: --  ABP(mean): --  RR: --  SpO2: --              PHYSICAL EXAM:  General: NAD, AAOX3, patient is laying comfortably in bed  HEENT: AT, NC, Supple, NO JVD, NO CB  Lungs: CTA B/L, no wheezing, no rhonchi  CVS: normal S1, S2, RRR, NO M/G/R  Abdomen: soft, bowel sounds present, mildly tender in periumbilical area and epigastric area, mildly-distended  Extremities: no edema, no clubbing, no cyanosis, positive peripheral pulses b/l  Neuro: no acute focal neurological deficits, generalized body weakness  Skin: no rash, no ecchymosis      LAB  CBC  Date: 03-30-24 @ 09:03  Mean cell Dvlixjpujb86.2  Mean cell Hemoglobin Conc31.9  Mean cell Volum 88.3  Platelet count-Automate 190  RBC Count 3.26  Red Cell Distrib Width13.7  WBC Count9.85  % Albumin, Urine--  Hematocrit 28.8  Hemoglobin 9.2  CBC  Date: 03-29-24 @ 06:33  Mean cell Xiwznimcaz09.9  Mean cell Hemoglobin Conc31.6  Mean cell Volum 88.1  Platelet count-Automate 178  RBC Count 3.12  Red Cell Distrib Width13.7  WBC Count10.36  % Albumin, Urine--  Hematocrit 27.5  Hemoglobin 8.7  CBC  Date: 03-28-24 @ 05:49  Mean cell Icmebarjrs67.0  Mean cell Hemoglobin Conc32.1  Mean cell Volum 87.3  Platelet count-Automate 211  RBC Count 3.53  Red Cell Distrib Width13.6  WBC Count12.74  % Albumin, Urine--  Hematocrit 30.8  Hemoglobin 9.9  CBC  Date: 03-27-24 @ 06:19  Mean cell Vpfsewdlxw05.8  Mean cell Hemoglobin Conc31.4  Mean cell Volum 88.6  Platelet count-Automate 222  RBC Count 3.16  Red Cell Distrib Width13.4  WBC Count9.34  % Albumin, Urine--  Hematocrit 28.0  Hemoglobin 8.8  CBC  Date: 03-26-24 @ 06:47  Mean cell Emmswowrze31.4  Mean cell Hemoglobin Conc32.1  Mean cell Volum 88.4  Platelet count-Automate 253  RBC Count 3.28  Red Cell Distrib Width13.5  WBC Count6.71  % Albumin, Urine--  Hematocrit 29.0  Hemoglobin 9.3  CBC  Date: 03-25-24 @ 06:36  Mean cell Ihqasccsfp09.6  Mean cell Hemoglobin Conc32.9  Mean cell Volum 87.1  Platelet count-Automate 291  RBC Count 3.18  Red Cell Distrib Width13.6  WBC Count7.33  % Albumin, Urine--  Hematocrit 27.7  Hemoglobin 9.1  CBC  Date: 03-24-24 @ 20:00  Mean cell Rlzdcwmvzg05.3  Mean cell Hemoglobin Conc31.8  Mean cell Volum 89.0  Platelet count-Automate 305  RBC Count 3.36  Red Cell Distrib Width13.7  WBC Count7.36  % Albumin, Urine--  Hematocrit 29.9  Hemoglobin 9.5    [facility]  03-30-24 @ 09:03  Blood Gas Arterial-Calcium,Ionized--  Blood Urea Nitrogen, Serum 79 mg/dL<HH> [10 - 20] [Critical value:]  Carbon Dioxide, Serum23 mmol/L [17 - 32]  Chloride, [id number] mmol/L [98 - 110]  Creatinie, Serum6.4 mg/dL<HH> [0.7 - 1.5] [Critical value:]  Glucose, Serum84 mg/dL [70 - 99]  Potassium, Serum4.5 mmol/L [3.5 - 5.0]  Sodium, Serum 140 mmol/L [135 - 146]  [facility]  03-29-24 @ 06:33  Blood Gas Arterial-Calcium,Ionized--  Blood Urea Nitrogen, Serum 80 mg/dL<HH> [10 - 20] [Critical value:]  Carbon Dioxide, Serum25 mmol/L [17 - 32]  Chloride, Klkxe842 mmol/L [98 - 110]  Creatinie, Serum6.0 mg/dL<HH> [0.7 - 1.5] [Critical value:]  Glucose, Serum78 mg/dL [70 - 99]  Potassium, Serum4.3 mmol/L [3.5 - 5.0]  Sodium, Serum 136 mmol/L [135 - 146]  [facility]  03-28-24 @ 05:49  Blood Gas Arterial-Calcium,Ionized--  Blood Urea Nitrogen, Serum 86 mg/dL<HH> [10 - 20] [Critical value:]  Carbon Dioxide, Serum20 mmol/L [17 - 32]  Chloride, Kbwfl324 mmol/L [98 - 110]  Creatinie, Serum6.2 mg/dL<HH> [0.7 - 1.5] [Critical value:]  Glucose, Serum69 mg/dL<L> [70 - 99]  Potassium, Serum4.9 mmol/L [3.5 - 5.0]  Sodium, Serum 136 mmol/L [135 - 146]  [facility]  03-27-24 @ 06:19  Blood Gas Arterial-Calcium,Ionized--  Blood Urea Nitrogen, Serum 86 mg/dL<HH> [10 - 20] [Critical value:]  Carbon Dioxide, Serum22 mmol/L [17 - 32]  Chloride, Brzgl307 mmol/L [98 - 110]  Creatinie, Serum5.6 mg/dL<HH> [0.7 - 1.5] [Critical value:]  Glucose, Serum76 mg/dL [70 - 99]  Potassium, Serum4.8 mmol/L [3.5 - 5.0]  Sodium, Serum 135 mmol/L [135 - 146]  [facility]  03-26-24 @ 06:47  Blood Gas Arterial-Calcium,Ionized--  Blood Urea Nitrogen, Serum 88 mg/dL<HH> [10 - 20] [Critical value:]  Carbon Dioxide, Serum20 mmol/L [17 - 32]  Chloride, Serum99 mmol/L [98 - 110]  Creatinie, Serum5.8 mg/dL<HH> [0.7 - 1.5] [Critical value:]  Glucose, Serum83 mg/dL [70 - 99]  Potassium, Serum4.7 mmol/L [3.5 - 5.0]  Sodium, Serum 133 mmol/L<L> [135 - 146]  [facility]  03-26-24 @ 01:16  Blood Gas Arterial-Calcium,Ionized--  Blood Urea Nitrogen, Serum 89 mg/dL<HH> [10 - 20] [Critical value:]  Carbon Dioxide, Serum22 mmol/L [17 - 32]  Chloride, Serum98 mmol/L [98 - 110]  Creatinie, Serum5.7 mg/dL<HH> [0.7 - 1.5] [Critical value:]  Glucose, Serum82 mg/dL [70 - 99]  Potassium, Serum4.7 mmol/L [3.5 - 5.0]  Sodium, Serum 132 mmol/L<L> [135 - 146]  BMP  03-25-24 @ 18:10  Blood Gas Arterial-Calcium,Ionized--  Blood Urea Nitrogen, Serum 90 mg/dL<HH> [10 - 20] [Critical value:]  Carbon Dioxide, Serum20 mmol/L [17 - 32]  Chloride, Serum99 mmol/L [98 - 110]  Creatinie, Serum5.4 mg/dL<HH> [0.7 - 1.5] [Critical value:]  Glucose, Serum98 mg/dL [70 - 99]  Potassium, Serum5.7 mmol/L<H> [3.5 - 5.0] [Hemolyzed. Interpret with caution]  Sodium, Serum 132 mmol/L<L> [135 - 146]        PT/INR - ( 30 Mar 2024 09:03 )   PT: 18.40 sec;   INR: 1.61 ratio         PTT - ( 30 Mar 2024 09:03 )  PTT:38.4 sec      Microbiology:    Culture - Blood (collected 03-27-24 @ 06:19)  Source: .Blood None  Preliminary Report (03-29-24 @ 22:01):    No growth at 48 Hours    Culture - Blood (collected 03-27-24 @ 01:15)  Source: .Blood None  Preliminary Report (03-30-24 @ 07:01):    No growth at 72 Hours    Culture - Urine (collected 03-22-24 @ 21:20)  Source: Clean Catch Clean Catch (Midstream)  Final Report (03-24-24 @ 07:05):    <10,000 CFU/mL Normal Urogenital Arturo    Culture - Urine (collected 03-21-24 @ 23:01)  Source: Clean Catch Clean Catch (Midstream)  Final Report (03-25-24 @ 08:23):    50,000 - 99,000 CFU/mL Escherichia coli    <10,000 CFU/ml Normal Urogenital arturo present  Organism: Escherichia coli (03-25-24 @ 08:23)  Organism: Escherichia coli (03-25-24 @ 08:23)      -  Levofloxacin: S <=0.5      -  Tobramycin: S <=2      -  Nitrofurantoin: S <=32 Should not be used to treat pyelonephritis      -  Aztreonam: S <=4      -  Gentamicin: S <=2      -  Cefazolin: S <=2 For uncomplicated UTI with K. pneumoniae, E. coli, or P. mirablis: YANY <=16 is sensitive and YANY >=32 is resistant. This also predicts results for oral agents cefaclor, cefdinir, cefpodoxime, cefprozil, cefuroxime axetil, cephalexin and locarbef for uncomplicated UTI. Note that some isolates may be susceptible to these agents while testing resistant to cefazolin.      -  Cefepime: S <=2      -  Piperacillin/Tazobactam: S <=8      -  Ciprofloxacin: S <=0.25      -  Imipenem: S <=1      -  Ceftriaxone: S <=1      -  Ampicillin: S <=8 These ampicillin results predict results for amoxicillin      Method Type: YANY      -  Meropenem: S <=1      -  Ampicillin/Sulbactam: S <=4/2      -  Cefoxitin: S <=8      -  Cefuroxime: S <=4      -  Amoxicillin/Clavulanic Acid: S <=8/4      -  Trimethoprim/Sulfamethoxazole: S <=0.5/9.5      -  Ertapenem: S <=0.5    Culture - Blood (collected 03-21-24 @ 23:01)  Source: .Blood Blood  Final Report (03-27-24 @ 07:01):    No growth at 5 days        Medications:  acetaminophen     Tablet .. 650 milliGRAM(s) Oral every 6 hours PRN  albuterol    90 MICROgram(s) HFA Inhaler 2 Puff(s) Inhalation every 6 hours PRN  amLODIPine   Tablet 5 milliGRAM(s) Oral at bedtime  atorvastatin 80 milliGRAM(s) Oral at bedtime  bisacodyl Suppository 10 milliGRAM(s) Rectal at bedtime  calcitriol   Capsule 0.25 MICROGram(s) Oral daily  calcium acetate 1334 milliGRAM(s) Oral every 12 hours  cholecalciferol 1000 Unit(s) Oral daily  folic acid 1 milliGRAM(s) Oral daily  isosorbide   mononitrate ER Tablet (IMDUR) 30 milliGRAM(s) Oral daily  metoprolol succinate ER 50 milliGRAM(s) Oral daily  mirtazapine 7.5 milliGRAM(s) Oral daily  montelukast 10 milliGRAM(s) Oral daily  ondansetron Injectable 4 milliGRAM(s) IV Push every 6 hours PRN  pantoprazole  Injectable 40 milliGRAM(s) IV Push once  polyethylene glycol 3350 17 Gram(s) Oral daily  senna 2 Tablet(s) Oral at bedtime  sertraline 25 milliGRAM(s) Oral daily  simethicone 80 milliGRAM(s) Chew every 6 hours PRN  sodium bicarbonate 650 milliGRAM(s) Oral every 12 hours  traMADol 25 milliGRAM(s) Oral every 8 hours PRN        Assessment and Plan:  Patient is an 87 y/o Female with  PMHx HTN, h/o DVT/ PE 2018 on ac, HFrEF s/p AICD, RA, CKD V, recent uti here with altered mental status, progressive CKD, with prep for HD tx.       # Altered mental status due to acute metabolic encephalopathy; in setting of recent uti- resolved   +fever; possible gastroenteritis  cth neg  ct abd with bladder wall thickening, enteritis  ucxs  ecoli- pan sensitive, patient was given 1st dose of IV Levaquin on 3/22, 2nd dose on 3/27, 3rd dose on 3/29, 4th dose will receive on 3/31/24.   blood cxs negative      # OMERO on  Chronic kidney disease (CKD), stage V. - worsening Creatinine level, started prep. for HD , f/up with Surgical team to place HD access , with possible HD tx. on monday  , f/up Nephrology team for rec.   - continue calcitriol 0.25, bicarb 650 bid, phoslo 1334 bid  - daily BMP, renal diet, strict I/O chart, avoid nephrotoxic agents     # Abdominal pain- added on Simethicone, ppi, IV Zofran prn, laxatives regimen. pain management prn.     # HTN - continue daily  norvasc 5, imdur 30, toprol 50.    # DVT, lower extremity. on eliquis tx. - on hold for HD access placement on monday       #  Chronic HFrEF (heart failure with reduced ejection fraction). clinically in euvolemic state  outpt Cardiology f/up     ## H/O rheumatoid arthritis.   ·  Plan: outpt f/u.    Code status: DNR/DNI     #Progress Note Handoff  Pending : complete abx. tx., f/up Surgical team to place HD access on monday, Eliquis is being held ,  daily CBC, BMP , mild abd. pain- clinical obs. tx. as above.   Family discussion: yes, medical team   Disposition: to be determined     Total time spent to complete patient's bedside assessment, review medical chart, discuss medical plan of care with covering medical team was more than 35 minutes with >50% of time spent face to face with patient, discussion with patient/family and/or coordination of care

## 2024-03-30 NOTE — PROGRESS NOTE ADULT - ASSESSMENT
81 yo  female patient ([employer]) with hx of CKD stage 5, HTN, DVT/PE 2018 (thought provoked by travel) completed 6m of AC then 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, NICM - HFrEF(LVEF 35-40% in 9/22) s/p AICD, RA, CKD stage 5. Currently on Macrobid for UTI (pt was discharge from [facility] 3/11 papers in her chart) presents to the ED for fluctuating mental status and abdominal pain with occasional CP.  Nephrology was consulted for OMERO over CKD stage 5.    # OMERO on CKD stage 5  Likely ATN precipitated by pre-renal etiology from poor oral intake  Cr stable, repeat BUN/sCr today pending  Borderline nonoliguric, not overloaded on exam  - continue sodium bicarbonate 650 q 12   - no acute need for RRT today, plan is for TDC Monday and initiation of HD then, OK to wait  - avoid nephrotoxic agents  - dose medications for eGFR <15    # CKD MBD  - continue phoslo 1334mg TID and calcitriol 0.25 mcg po q24h     # anemia of ckd  Hgb 9.2    # HTN  Controlled  - continue Amlodipine 5mg daily and Metoprolol ER 50mg daily      will follow       81 yo  female patient ([employer]) with hx of CKD stage 5, HTN, DVT/PE 2018 (thought provoked by travel) completed 6m of AC then 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, NICM - HFrEF(LVEF 35-40% in 9/22) s/p AICD, RA, CKD stage 5. Currently on Macrobid for UTI (pt was discharge from [facility] 3/11 papers in her chart) presents to the ED for fluctuating mental status and abdominal pain with occasional CP.  Nephrology was consulted for OMERO over CKD stage 5.    # OMERO on CKD stage 5  Likely ATN precipitated by pre-renal etiology from poor oral intake  Cr stable, repeat BUN/sCr today pending  Borderline nonoliguric, not overloaded on exam  - continue sodium bicarbonate 650 q 12   - no acute need for RRT today, plan is for TDC Monday and initiation of HD then, OK to wait  - avoid nephrotoxic agents  - dose medications for eGFR <15    # CKD MBD  - continue phoslo 1334mg TID and calcitriol 0.25 mcg po q24h     # anemia of ckd  Hgb 9.2  - check Ferritin and Iron Sat  - patient is [employer], will not accept blood products  - may need to consider IV Iron or ELIJAH    # HTN  Controlled  - continue Amlodipine 5mg daily and Metoprolol ER 50mg daily      will follow

## 2024-03-30 NOTE — PROGRESS NOTE ADULT - SUBJECTIVE AND OBJECTIVE BOX
24H events:    Patient is a 88y old Female who presents with a chief complaint of AMS (28 Mar 2024 15:42)    Primary diagnosis of OMERO (acute kidney injury)    Today is hospital day 7d. This morning patient was seen and examined at bedside, resting comfortably in bed on 2.5L NC. Had liquid BM yesterday.    No acute or major events overnight.    PAST MEDICAL & SURGICAL HISTORY  Chronic kidney disease    Hypertension    Gout    Diverticulitis  sigmoid    Rheumatoid arthritis    DVT, lower extremity  Bilateral    Pulmonary embolism    Chronic HFrEF (heart failure with reduced ejection fraction)    AICD (automatic cardioverter/defibrillator) present    Artificial pacemaker    History of appendectomy    History of tonsillectomy    History of hysterectomy    H/O hernia repair        ALLERGIES:  Keflex (Swelling)  cephalosporins (Angioedema)    MEDICATIONS:  STANDING MEDICATIONS  amLODIPine   Tablet 5 milliGRAM(s) Oral at bedtime  apixaban 2.5 milliGRAM(s) Oral every 12 hours  atorvastatin 80 milliGRAM(s) Oral at bedtime  calcitriol   Capsule 0.25 MICROGram(s) Oral daily  calcium acetate 1334 milliGRAM(s) Oral every 12 hours  folic acid 1 milliGRAM(s) Oral daily  isosorbide   mononitrate ER Tablet (IMDUR) 30 milliGRAM(s) Oral daily  levoFLOXacin IVPB 500 milliGRAM(s) IV Intermittent every 48 hours  metoprolol succinate ER 50 milliGRAM(s) Oral daily  mirtazapine 7.5 milliGRAM(s) Oral daily  montelukast 10 milliGRAM(s) Oral daily  sertraline 25 milliGRAM(s) Oral daily  simethicone 80 milliGRAM(s) Chew daily  sodium bicarbonate 650 milliGRAM(s) Oral every 12 hours    PRN MEDICATIONS  acetaminophen     Tablet .. 650 milliGRAM(s) Oral every 6 hours PRN  albuterol    90 MICROgram(s) HFA Inhaler 2 Puff(s) Inhalation every 6 hours PRN    VITALS:   T(F): 96.8  HR: 84  BP: 111/70  RR: 18  SpO2: 96%    PHYSICAL EXAM:    CONSTITUTIONAL: No acute distress.  HEAD: Normocephalic; atraumatic.  EYES: Pupils equal round reactive to light  NECK: Supple  CARD: Regular rate and rhythm. Normal S1, S2  RESP: Lungs clear to auscultation bilaterally. No wheezes  ABD: Abdomen soft; non-tender; non-distended  EXT: No clubbing, cyanosis or edema.   NEURO: Alert and oriented x 3    LABS:                        9.9    12.74 )-----------( 211      ( 28 Mar 2024 05:49 )             30.8     03-28    136  |  101  |  86<HH>  ----------------------------<  69<L>  4.9   |  20  |  6.2<HH>    Ca    8.3<L>      28 Mar 2024 05:49  Mg     1.8     03-28    TPro  4.9<L>  /  Alb  2.8<L>  /  TBili  0.3  /  DBili  <0.2  /  AST  30  /  ALT  15  /  AlkPhos  99  03-27      Urinalysis Basic - ( 28 Mar 2024 05:49 )    Color: x / Appearance: x / SG: x / pH: x  Gluc: 69 mg/dL / Ketone: x  / Bili: x / Urobili: x   Blood: x / Protein: x / Nitrite: x   Leuk Esterase: x / RBC: x / WBC x   Sq Epi: x / Non Sq Epi: x / Bacteria: x      Culture - Blood (collected 27 Mar 2024 06:19)  Source: .Blood None  Preliminary Report (28 Mar 2024 22:02):    No growth at 24 hours    Culture - Blood (collected 27 Mar 2024 01:15)  Source: .Blood None  Preliminary Report (29 Mar 2024 07:02):    No growth at 48 Hours          RADIOLOGY:

## 2024-03-30 NOTE — PROGRESS NOTE ADULT - SUBJECTIVE AND OBJECTIVE BOX
Nephrology progress note    THIS IS AN INCOMPLETE NOTE . FULL NOTE TO FOLLOW SHORTLY    Patient is seen and examined, events over the last 24 h noted .  Repeat renal function pending this morning.      Allergies:  Keflex (Swelling)  cephalosporins (Angioedema)    Hospital Medications:   MEDICATIONS  (STANDING):  amLODIPine   Tablet 5 milliGRAM(s) Oral at bedtime  atorvastatin 80 milliGRAM(s) Oral at bedtime  calcitriol   Capsule 0.25 MICROGram(s) Oral daily  calcium acetate 1334 milliGRAM(s) Oral every 12 hours  folic acid 1 milliGRAM(s) Oral daily  isosorbide   mononitrate ER Tablet (IMDUR) 30 milliGRAM(s) Oral daily  metoprolol succinate ER 50 milliGRAM(s) Oral daily  mirtazapine 7.5 milliGRAM(s) Oral daily  montelukast 10 milliGRAM(s) Oral daily  polyethylene glycol 3350 17 Gram(s) Oral daily  senna 2 Tablet(s) Oral at bedtime  sertraline 25 milliGRAM(s) Oral daily  simethicone 80 milliGRAM(s) Chew daily  sodium bicarbonate 650 milliGRAM(s) Oral every 12 hours        VITALS:  T(F): 97 (03-29-24 @ 12:28), Max: 97 (03-29-24 @ 12:28)  HR: 69 (03-29-24 @ 12:28)  BP: 132/61 (03-29-24 @ 12:28)  RR: 18 (03-29-24 @ 12:28)  SpO2: --  Wt(kg): --    03-28 @ 07:01  -  03-29 @ 07:00  --------------------------------------------------------  IN: 384 mL / OUT: 500 mL / NET: -116 mL          PHYSICAL EXAM:  Constitutional: cachectic   Respiratory: CTAB, no wheezes, rales or rhonchi  Cardiovascular: S1, S2, RRR  Gastrointestinal: BS+, soft, NT/ND  Extremities: No cyanosis or clubbing. No peripheral edema  :  No carlos.   Skin: No rashes      LABS:  03-30    140  |  104  |  x   ----------------------------<  84  4.5   |  23  |  x     Ca    8.1<L>      30 Mar 2024 09:03  Mg     1.9     03-30    TPro  5.4<L>  /  Alb  3.0<L>  /  TBili  0.4  /  DBili      /  AST  31  /  ALT  15  /  AlkPhos  102  03-30                          9.2    9.85  )-----------( 190      ( 30 Mar 2024 09:03 )             28.8       Urine Studies:  Urinalysis Basic - ( 30 Mar 2024 09:03 )    Color:  / Appearance:  / SG:  / pH:   Gluc: 84 mg/dL / Ketone:   / Bili:  / Urobili:    Blood:  / Protein:  / Nitrite:    Leuk Esterase:  / RBC:  / WBC    Sq Epi:  / Non Sq Epi:  / Bacteria:       Sodium, Random Urine: 36.0 mmoL/L (03-23 @ 21:23)  Creatinine, Random Urine: 45 mg/dL (03-23 @ 21:23)  Protein/Creatinine Ratio Calculation: 2.4 Ratio (03-23 @ 21:23)  Osmolality, Random Urine: 273 mos/kg (03-23 @ 21:23)  Potassium, Random Urine: 16 mmol/L (03-23 @ 21:23)    RADIOLOGY & ADDITIONAL STUDIES:   Nephrology progress note    Patient is seen and examined, events over the last 24 h noted .  Repeat renal function pending this morning.  Comfortable of NC.    Allergies:  Keflex (Swelling)  cephalosporins (Angioedema)    Hospital Medications:   MEDICATIONS  (STANDING):  amLODIPine   Tablet 5 milliGRAM(s) Oral at bedtime  atorvastatin 80 milliGRAM(s) Oral at bedtime  calcitriol   Capsule 0.25 MICROGram(s) Oral daily  calcium acetate 1334 milliGRAM(s) Oral every 12 hours  folic acid 1 milliGRAM(s) Oral daily  isosorbide   mononitrate ER Tablet (IMDUR) 30 milliGRAM(s) Oral daily  metoprolol succinate ER 50 milliGRAM(s) Oral daily  mirtazapine 7.5 milliGRAM(s) Oral daily  montelukast 10 milliGRAM(s) Oral daily  polyethylene glycol 3350 17 Gram(s) Oral daily  senna 2 Tablet(s) Oral at bedtime  sertraline 25 milliGRAM(s) Oral daily  simethicone 80 milliGRAM(s) Chew daily  sodium bicarbonate 650 milliGRAM(s) Oral every 12 hours        VITALS:  T(F): 97 (03-29-24 @ 12:28), Max: 97 (03-29-24 @ 12:28)  HR: 69 (03-29-24 @ 12:28)  BP: 132/61 (03-29-24 @ 12:28)  RR: 18 (03-29-24 @ 12:28)  SpO2: --  Wt(kg): --    03-28 @ 07:01  -  03-29 @ 07:00  --------------------------------------------------------  IN: 384 mL / OUT: 500 mL / NET: -116 mL          PHYSICAL EXAM:  Constitutional: cachectic   Respiratory: CTAB, no wheezes, rales or rhonchi  Cardiovascular: S1, S2, RRR  Gastrointestinal: BS+, soft, NT/ND  Extremities: No cyanosis or clubbing. No peripheral edema  :  No carlos.   Skin: No rashes      LABS:  03-30    140  |  104  |  x   ----------------------------<  84  4.5   |  23  |  x     Ca    8.1<L>      30 Mar 2024 09:03  Mg     1.9     03-30    TPro  5.4<L>  /  Alb  3.0<L>  /  TBili  0.4  /  DBili      /  AST  31  /  ALT  15  /  AlkPhos  102  03-30                          9.2    9.85  )-----------( 190      ( 30 Mar 2024 09:03 )             28.8       Urine Studies:  Urinalysis Basic - ( 30 Mar 2024 09:03 )    Color:  / Appearance:  / SG:  / pH:   Gluc: 84 mg/dL / Ketone:   / Bili:  / Urobili:    Blood:  / Protein:  / Nitrite:    Leuk Esterase:  / RBC:  / WBC    Sq Epi:  / Non Sq Epi:  / Bacteria:       Sodium, Random Urine: 36.0 mmoL/L (03-23 @ 21:23)  Creatinine, Random Urine: 45 mg/dL (03-23 @ 21:23)  Protein/Creatinine Ratio Calculation: 2.4 Ratio (03-23 @ 21:23)  Osmolality, Random Urine: 273 mos/kg (03-23 @ 21:23)  Potassium, Random Urine: 16 mmol/L (03-23 @ 21:23)    RADIOLOGY & ADDITIONAL STUDIES:

## 2024-03-31 LAB
ALBUMIN SERPL ELPH-MCNC: 2.9 G/DL — LOW (ref 3.5–5.2)
ALP SERPL-CCNC: 97 U/L — SIGNIFICANT CHANGE UP (ref 30–115)
ALT FLD-CCNC: 16 U/L — SIGNIFICANT CHANGE UP (ref 0–41)
ANION GAP SERPL CALC-SCNC: 10 MMOL/L — SIGNIFICANT CHANGE UP (ref 7–14)
APTT BLD: 34.7 SEC — SIGNIFICANT CHANGE UP (ref 27–39.2)
AST SERPL-CCNC: 31 U/L — SIGNIFICANT CHANGE UP (ref 0–41)
BASOPHILS # BLD AUTO: 0.04 K/UL — SIGNIFICANT CHANGE UP (ref 0–0.2)
BASOPHILS NFR BLD AUTO: 0.4 % — SIGNIFICANT CHANGE UP (ref 0–1)
BILIRUB SERPL-MCNC: <0.2 MG/DL — SIGNIFICANT CHANGE UP (ref 0.2–1.2)
BUN SERPL-MCNC: 78 MG/DL — CRITICAL HIGH (ref 10–20)
CALCIUM SERPL-MCNC: 8.1 MG/DL — LOW (ref 8.4–10.5)
CHLORIDE SERPL-SCNC: 108 MMOL/L — SIGNIFICANT CHANGE UP (ref 98–110)
CO2 SERPL-SCNC: 23 MMOL/L — SIGNIFICANT CHANGE UP (ref 17–32)
CREAT SERPL-MCNC: 6.1 MG/DL — CRITICAL HIGH (ref 0.7–1.5)
EGFR: 6 ML/MIN/1.73M2 — LOW
EOSINOPHIL # BLD AUTO: 0.4 K/UL — SIGNIFICANT CHANGE UP (ref 0–0.7)
EOSINOPHIL NFR BLD AUTO: 3.5 % — SIGNIFICANT CHANGE UP (ref 0–8)
GLUCOSE SERPL-MCNC: 94 MG/DL — SIGNIFICANT CHANGE UP (ref 70–99)
HCT VFR BLD CALC: 27.7 % — LOW (ref 37–47)
HGB BLD-MCNC: 8.8 G/DL — LOW (ref 12–16)
IMM GRANULOCYTES NFR BLD AUTO: 0.5 % — HIGH (ref 0.1–0.3)
INR BLD: 1.37 RATIO — HIGH (ref 0.65–1.3)
LYMPHOCYTES # BLD AUTO: 1.44 K/UL — SIGNIFICANT CHANGE UP (ref 1.2–3.4)
LYMPHOCYTES # BLD AUTO: 12.7 % — LOW (ref 20.5–51.1)
MAGNESIUM SERPL-MCNC: 1.9 MG/DL — SIGNIFICANT CHANGE UP (ref 1.8–2.4)
MCHC RBC-ENTMCNC: 28.3 PG — SIGNIFICANT CHANGE UP (ref 27–31)
MCHC RBC-ENTMCNC: 31.8 G/DL — LOW (ref 32–37)
MCV RBC AUTO: 89.1 FL — SIGNIFICANT CHANGE UP (ref 81–99)
MONOCYTES # BLD AUTO: 1.16 K/UL — HIGH (ref 0.1–0.6)
MONOCYTES NFR BLD AUTO: 10.2 % — HIGH (ref 1.7–9.3)
NEUTROPHILS # BLD AUTO: 8.23 K/UL — HIGH (ref 1.4–6.5)
NEUTROPHILS NFR BLD AUTO: 72.7 % — SIGNIFICANT CHANGE UP (ref 42.2–75.2)
NRBC # BLD: 0 /100 WBCS — SIGNIFICANT CHANGE UP (ref 0–0)
PLATELET # BLD AUTO: 182 K/UL — SIGNIFICANT CHANGE UP (ref 130–400)
PMV BLD: 9.8 FL — SIGNIFICANT CHANGE UP (ref 7.4–10.4)
POTASSIUM SERPL-MCNC: 4.6 MMOL/L — SIGNIFICANT CHANGE UP (ref 3.5–5)
POTASSIUM SERPL-SCNC: 4.6 MMOL/L — SIGNIFICANT CHANGE UP (ref 3.5–5)
PROT SERPL-MCNC: 5 G/DL — LOW (ref 6–8)
PROTHROM AB SERPL-ACNC: 15.7 SEC — HIGH (ref 9.95–12.87)
RBC # BLD: 3.11 M/UL — LOW (ref 4.2–5.4)
RBC # FLD: 13.8 % — SIGNIFICANT CHANGE UP (ref 11.5–14.5)
SODIUM SERPL-SCNC: 141 MMOL/L — SIGNIFICANT CHANGE UP (ref 135–146)
WBC # BLD: 11.33 K/UL — HIGH (ref 4.8–10.8)
WBC # FLD AUTO: 11.33 K/UL — HIGH (ref 4.8–10.8)

## 2024-03-31 PROCEDURE — 99232 SBSQ HOSP IP/OBS MODERATE 35: CPT

## 2024-03-31 RX ORDER — PANTOPRAZOLE SODIUM 20 MG/1
40 TABLET, DELAYED RELEASE ORAL ONCE
Refills: 0 | Status: COMPLETED | OUTPATIENT
Start: 2024-03-31 | End: 2024-03-31

## 2024-03-31 RX ADMIN — Medication 1334 MILLIGRAM(S): at 17:13

## 2024-03-31 RX ADMIN — CALCITRIOL 0.25 MICROGRAM(S): 0.5 CAPSULE ORAL at 11:38

## 2024-03-31 RX ADMIN — Medication 1000 UNIT(S): at 11:50

## 2024-03-31 RX ADMIN — Medication 1334 MILLIGRAM(S): at 05:59

## 2024-03-31 RX ADMIN — MONTELUKAST 10 MILLIGRAM(S): 4 TABLET, CHEWABLE ORAL at 11:37

## 2024-03-31 RX ADMIN — POLYETHYLENE GLYCOL 3350 17 GRAM(S): 17 POWDER, FOR SOLUTION ORAL at 11:38

## 2024-03-31 RX ADMIN — PANTOPRAZOLE SODIUM 40 MILLIGRAM(S): 20 TABLET, DELAYED RELEASE ORAL at 10:14

## 2024-03-31 RX ADMIN — MIRTAZAPINE 7.5 MILLIGRAM(S): 45 TABLET, ORALLY DISINTEGRATING ORAL at 11:38

## 2024-03-31 RX ADMIN — ISOSORBIDE MONONITRATE 30 MILLIGRAM(S): 60 TABLET, EXTENDED RELEASE ORAL at 11:38

## 2024-03-31 RX ADMIN — Medication 1 MILLIGRAM(S): at 11:38

## 2024-03-31 RX ADMIN — ATORVASTATIN CALCIUM 80 MILLIGRAM(S): 80 TABLET, FILM COATED ORAL at 22:10

## 2024-03-31 RX ADMIN — Medication 650 MILLIGRAM(S): at 17:13

## 2024-03-31 RX ADMIN — Medication 50 MILLIGRAM(S): at 05:59

## 2024-03-31 RX ADMIN — SERTRALINE 25 MILLIGRAM(S): 25 TABLET, FILM COATED ORAL at 11:37

## 2024-03-31 RX ADMIN — Medication 650 MILLIGRAM(S): at 05:58

## 2024-03-31 NOTE — PROGRESS NOTE ADULT - SUBJECTIVE AND OBJECTIVE BOX
Nephrology Progress Note    RCAHEL SUMMERS  [id number]  88y  Female    S:  Events over the last 24h noted.    O:  Allergies:  Keflex (Swelling)  cephalosporins (Angioedema)    Hospital Medications:   MEDICATIONS  (STANDING):  atorvastatin 80 milliGRAM(s) Oral at bedtime  bisacodyl Suppository 10 milliGRAM(s) Rectal at bedtime  calcitriol   Capsule 0.25 MICROGram(s) Oral daily  calcium acetate 1334 milliGRAM(s) Oral every 12 hours  cholecalciferol 1000 Unit(s) Oral daily  folic acid 1 milliGRAM(s) Oral daily  isosorbide   mononitrate ER Tablet (IMDUR) 30 milliGRAM(s) Oral daily  levoFLOXacin IVPB 500 milliGRAM(s) IV Intermittent every 48 hours  metoprolol succinate ER 50 milliGRAM(s) Oral daily  mirtazapine 7.5 milliGRAM(s) Oral daily  montelukast 10 milliGRAM(s) Oral daily  polyethylene glycol 3350 17 Gram(s) Oral daily  senna 2 Tablet(s) Oral at bedtime  sertraline 25 milliGRAM(s) Oral daily  sodium bicarbonate 650 milliGRAM(s) Oral every 12 hours    MEDICATIONS  (PRN):  acetaminophen     Tablet .. 650 milliGRAM(s) Oral every 6 hours PRN Mild Pain (1 - 3)  albuterol    90 MICROgram(s) HFA Inhaler 2 Puff(s) Inhalation every 6 hours PRN for shortness of breath and/or wheezing  ondansetron Injectable 4 milliGRAM(s) IV Push every 6 hours PRN Nausea and/or Vomiting  simethicone 80 milliGRAM(s) Chew every 6 hours PRN Gas  traMADol 25 milliGRAM(s) Oral every 8 hours PRN Moderate Pain (4 - 6)    Home Medications:  Albuterol (Eqv-Proventil HFA) 90 mcg/inh inhalation aerosol: 2 puff(s) inhaled every 6 hours as needed for  shortness of breath and/or wheezing (22 Mar 2024 04:37)  amLODIPine 5 mg oral tablet: 1 tab(s) orally once a day (at bedtime) (22 Mar 2024 04:47)  apixaban 2.5 mg oral tablet: 1 tab(s) orally 2 times a day (22 Mar 2024 04:47)  atorvastatin 80 mg oral tablet: 1 tab(s) orally once a day (at bedtime) (22 Mar 2024 04:53)  calcium acetate 667 mg oral capsule: 2 cap(s) orally 2 times a day (22 Mar 2024 04:37)  Entresto 24 mg-26 mg oral tablet: 1 tab(s) orally 2 times a day (22 Mar 2024 04:53)  ferrous sulfate 325 mg (65 mg elemental iron) oral tablet: 1 tab(s) orally every other day (22 Mar 2024 04:38)  folic acid 1 mg oral tablet: 1 tab(s) orally once a day (22 Mar 2024 04:53)  furosemide 40 mg oral tablet: 1 tab(s) orally once a day (22 Mar 2024 04:53)  Imdur 30 mg oral tablet, extended release: 1 tab(s) orally once a day (22 Mar 2024 04:53)  latanoprost 0.005% ophthalmic solution: 1 drop(s) in each eye 2 times a day (22 Mar 2024 04:53)  Macrodantin 100 mg oral capsule: 1 cap(s) orally 2 times a day (22 Mar 2024 04:53)  metoprolol succinate 50 mg oral capsule, extended release: 1 cap(s) orally once a day (22 Mar 2024 04:53)  montelukast 10 mg oral tablet: 1 tab(s) orally once a day (22 Mar 2024 04:53)  olopatadine 0.2% ophthalmic solution: 1 drop(s) in each affected eye once a day (22 Mar 2024 04:53)  Plavix 75 mg oral tablet: 1 tab(s) orally once a day (22 Mar 2024 04:53)  predniSONE 20 mg oral tablet: 1 tab(s) orally once a day for 3 days (22 Mar 2024 04:53)  sertraline 25 mg oral tablet: 1 tab(s) orally once a day (22 Mar 2024 04:53)  Timolol Maleate, Ophthalmic 0.5% preservative-free ophthalmic solution: 1 drop(s) to both eyes 2 times a day (22 Mar 2024 04:47)      VITALS:  Daily     Daily   T(F): 97.6 (03-30-24 @ 13:20), Max: 97.6 (03-30-24 @ 13:20)  HR: 83 (03-31-24 @ 05:56)  BP: 122/57 (03-31-24 @ 05:56)  RR: 77 (03-30-24 @ 13:20)  SpO2: --  Wt(kg): --  I&O's Detail    I&O's Summary        LABS:    03-31    141  |  108  |  78<HH>  ----------------------------<  94  4.6   |  23  |  6.1<HH>    Ca    8.1<L>      31 Mar 2024 08:59  Mg     1.9     03-31    TPro  5.0<L>  /  Alb  2.9<L>  /  TBili  <0.2  /  DBili      /  AST  31  /  ALT  16  /  AlkPhos  97  03-31      Phosphorus: 3.9 mg/dL (03-26-24 @ 06:47)  Phosphorus: 4.0 mg/dL (03-25-24 @ 06:36)                          8.8    11.33 )-----------( 182      ( 31 Mar 2024 08:59 )             27.7     Mean Cell Volume: 89.1 fL (03-31-24 @ 08:59)      Urine Studies:  Protein, Urine: 300 mg/dL (03-27-24 @ 14:32)  White Blood Cell - Urine: 2 /HPF (03-27-24 @ 14:32)  Red Blood Cell - Urine: 3 /HPF (03-27-24 @ 14:32)      Creatinine trend:  Creatinine: 6.1 mg/dL (03-31-24 @ 08:59)  Creatinine: 6.4 mg/dL (03-30-24 @ 09:03)  Creatinine: 6.0 mg/dL (03-29-24 @ 06:33)  Creatinine: 6.2 mg/dL (03-28-24 @ 05:49)  Creatinine: 5.6 mg/dL (03-27-24 @ 06:19)  Creatinine: 5.8 mg/dL (03-26-24 @ 06:47)

## 2024-03-31 NOTE — PROGRESS NOTE ADULT - ASSESSMENT
81 yo  female patient (Protestant) with hx of CKD stage 5, HTN, DVT/PE 2018 (thought provoked by travel) completed 6m of AC then 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, NICM - HFrEF(LVEF 35-40% in 9/22) s/p AICD, RA, CKD stage 5. Currently on Macrobid for UTI (pt was discharge from [facility] 3/11 papers in her chart) presents to the ED for fluctuating mental status and abdominal pain with occasional CP.  Nephrology was consulted for OMERO over CKD stage 5.    # OMERO on CKD stage 5  Likely ATN precipitated by pre-renal etiology  Cr stable, urine output not documented  - continue sodium bicarbonate 650 q 12   - no acute need for RRT today, plan is for TDC tomorrow and initiation of HD then  - avoid nephrotoxic agents  - dose medications for eGFR <15    # CKD MBD  - continue phoslo 1334mg TID and calcitriol 0.25 mcg po q24h     # anemia of ckd  Hgb 9.2  - check Ferritin and Iron Sat  - patient is Protestant, will not accept blood products  - will need IV Iron and/or ELIJAH    # HTN  Controlled  - continue Amlodipine 5mg daily and Metoprolol ER 50mg daily      will follow

## 2024-03-31 NOTE — PROGRESS NOTE ADULT - SUBJECTIVE AND OBJECTIVE BOX
GRACIESUNNY RACHEL  [facility]-N 4A 022 B (SI-N 4A)      Patient was evaluated and examined  by bedside, c/o mild epigastric burning abdominal pain, no nausea, no vomiting, poor appetite, started on Nepro supplement, remains afebrile, denies dysuria.      REVIEW OF SYSTEMS:  please see pertinent positives mentioned above, all other 12 ROS negative      T(C): , Max: 36.4 (03-30-24 @ 13:20)  HR: 83 (03-31-24 @ 05:56)  BP: 122/57 (03-31-24 @ 05:56)  RR: 77 (03-30-24 @ 13:20)  SpO2: --  CAPILLARY BLOOD GLUCOSE          PHYSICAL EXAM:  General: NAD, AAOX3, patient is laying comfortably in bed  HEENT: AT, NC, Supple, NO JVD, NO CB  Lungs: CTA B/L, no wheezing, no rhonchi  CVS: normal S1, S2, RRR, NO M/G/R  Abdomen: soft, bowel sounds present, mild discomfort in epigastric area  Extremities: no edema, no clubbing, no cyanosis, positive peripheral pulses b/l  Neuro: no acute focal neurological deficits, generalized body weakness  Skin: no rash, no ecchymosis        LAB  CBC  Date: 03-31-24 @ 08:59  Mean cell Jygvsdyiva30.3  Mean cell Hemoglobin Conc31.8  Mean cell Volum 89.1  Platelet count-Automate 182  RBC Count 3.11  Red Cell Distrib Width13.8  WBC Count11.33  % Albumin, Urine--  Hematocrit 27.7  Hemoglobin 8.8  CBC  Date: 03-30-24 @ 09:03  Mean cell Ofikyzotsy05.2  Mean cell Hemoglobin Conc31.9  Mean cell Volum 88.3  Platelet count-Automate 190  RBC Count 3.26  Red Cell Distrib Width13.7  WBC Count9.85  % Albumin, Urine--  Hematocrit 28.8  Hemoglobin 9.2  CBC  Date: 03-29-24 @ 06:33  Mean cell Okhbuciosl87.9  Mean cell Hemoglobin Conc31.6  Mean cell Volum 88.1  Platelet count-Automate 178  RBC Count 3.12  Red Cell Distrib Width13.7  WBC Count10.36  % Albumin, Urine--  Hematocrit 27.5  Hemoglobin 8.7  CBC  Date: 03-28-24 @ 05:49  Mean cell Kendpbncnl50.0  Mean cell Hemoglobin Conc32.1  Mean cell Volum 87.3  Platelet count-Automate 211  RBC Count 3.53  Red Cell Distrib Width13.6  WBC Count12.74  % Albumin, Urine--  Hematocrit 30.8  Hemoglobin 9.9  CBC  Date: 03-27-24 @ 06:19  Mean cell Goqvokvuwb51.8  Mean cell Hemoglobin Conc31.4  Mean cell Volum 88.6  Platelet count-Automate 222  RBC Count 3.16  Red Cell Distrib Width13.4  WBC Count9.34  % Albumin, Urine--  Hematocrit 28.0  Hemoglobin 8.8  CBC  Date: 03-26-24 @ 06:47  Mean cell Kemkucqsoh61.4  Mean cell Hemoglobin Conc32.1  Mean cell Volum 88.4  Platelet count-Automate 253  RBC Count 3.28  Red Cell Distrib Width13.5  WBC Count6.71  % Albumin, Urine--  Hematocrit 29.0  Hemoglobin 9.3  CBC  Date: 03-25-24 @ 06:36  Mean cell Ucrburcwrz14.6  Mean cell Hemoglobin Conc32.9  Mean cell Volum 87.1  Platelet count-Automate 291  RBC Count 3.18  Red Cell Distrib Width13.6  WBC Count7.33  % Albumin, Urine--  Hematocrit 27.7  Hemoglobin 9.1  CBC  Date: 03-24-24 @ 20:00  Mean cell Mptytvhsyu33.3  Mean cell Hemoglobin Conc31.8  Mean cell Volum 89.0  Platelet count-Automate 305  RBC Count 3.36  Red Cell Distrib Width13.7  WBC Count7.36  % Albumin, Urine--  Hematocrit 29.9  Hemoglobin 9.5    BMP  03-30-24 @ 09:03  Blood Gas Arterial-Calcium,Ionized--  Blood Urea Nitrogen, Serum 79 mg/dL<HH> [10 - 20] [Critical value:]  Carbon Dioxide, Serum23 mmol/L [17 - 32]  Chloride, Sbjjj967 mmol/L [98 - 110]  Creatinie, Serum6.4 mg/dL<HH> [0.7 - 1.5] [Critical value:]  Glucose, Serum84 mg/dL [70 - 99]  Potassium, Serum4.5 mmol/L [3.5 - 5.0]  Sodium, Serum 140 mmol/L [135 - 146]  [facility]  03-29-24 @ 06:33  Blood Gas Arterial-Calcium,Ionized--  Blood Urea Nitrogen, Serum 80 mg/dL<HH> [10 - 20] [Critical value:]  Carbon Dioxide, Serum25 mmol/L [17 - 32]  Chloride, Nsdpm800 mmol/L [98 - 110]  Creatinie, Serum6.0 mg/dL<HH> [0.7 - 1.5] [Critical value:]  Glucose, Serum78 mg/dL [70 - 99]  Potassium, Serum4.3 mmol/L [3.5 - 5.0]  Sodium, Serum 136 mmol/L [135 - 146]  [facility]  03-28-24 @ 05:49  Blood Gas Arterial-Calcium,Ionized--  Blood Urea Nitrogen, Serum 86 mg/dL<HH> [10 - 20] [Critical value:]  Carbon Dioxide, Serum20 mmol/L [17 - 32]  Chloride, Wmcsj751 mmol/L [98 - 110]  Creatinie, Serum6.2 mg/dL<HH> [0.7 - 1.5] [Critical value:]  Glucose, Serum69 mg/dL<L> [70 - 99]  Potassium, Serum4.9 mmol/L [3.5 - 5.0]  Sodium, Serum 136 mmol/L [135 - 146]  [facility]  03-27-24 @ 06:19  Blood Gas Arterial-Calcium,Ionized--  Blood Urea Nitrogen, Serum 86 mg/dL<HH> [10 - 20] [Critical value:]  Carbon Dioxide, Serum22 mmol/L [17 - 32]  Chloride, Pkciq376 mmol/L [98 - 110]  Creatinie, Serum5.6 mg/dL<HH> [0.7 - 1.5] [Critical value:]  Glucose, Serum76 mg/dL [70 - 99]  Potassium, Serum4.8 mmol/L [3.5 - 5.0]  Sodium, Serum 135 mmol/L [135 - 146]        PT/INR - ( 31 Mar 2024 08:59 )   PT: 15.70 sec;   INR: 1.37 ratio         PTT - ( 30 Mar 2024 09:03 )  PTT:38.4 sec      Microbiology:    Culture - Blood (collected 03-27-24 @ 06:19)  Source: .Blood None  Preliminary Report (03-30-24 @ 22:01):    No growth at 72 Hours    Culture - Blood (collected 03-27-24 @ 01:15)  Source: .Blood None  Preliminary Report (03-31-24 @ 07:01):    No growth at 4 days    Culture - Urine (collected 03-22-24 @ 21:20)  Source: Clean Catch Clean Catch (Midstream)  Final Report (03-24-24 @ 07:05):    <10,000 CFU/mL Normal Urogenital Arturo    Culture - Blood (collected 03-21-24 @ 23:01)  Source: .Blood Blood  Final Report (03-27-24 @ 07:01):    No growth at 5 days    Culture - Urine (collected 03-21-24 @ 23:01)  Source: Clean Catch Clean Catch (Midstream)  Final Report (03-25-24 @ 08:23):    50,000 - 99,000 CFU/mL Escherichia coli    <10,000 CFU/ml Normal Urogenital arturo present  Organism: Escherichia coli (03-25-24 @ 08:23)  Organism: Escherichia coli (03-25-24 @ 08:23)      Method Type: YANY      -  Amoxicillin/Clavulanic Acid: S <=8/4      -  Ampicillin: S <=8 These ampicillin results predict results for amoxicillin      -  Ampicillin/Sulbactam: S <=4/2      -  Aztreonam: S <=4      -  Cefazolin: S <=2 For uncomplicated UTI with K. pneumoniae, E. coli, or P. mirablis: YANY <=16 is sensitive and YANY >=32 is resistant. This also predicts results for oral agents cefaclor, cefdinir, cefpodoxime, cefprozil, cefuroxime axetil, cephalexin and locarbef for uncomplicated UTI. Note that some isolates may be susceptible to these agents while testing resistant to cefazolin.      -  Cefepime: S <=2      -  Cefoxitin: S <=8      -  Ceftriaxone: S <=1      -  Cefuroxime: S <=4      -  Ciprofloxacin: S <=0.25      -  Ertapenem: S <=0.5      -  Gentamicin: S <=2      -  Imipenem: S <=1      -  Levofloxacin: S <=0.5      -  Meropenem: S <=1      -  Nitrofurantoin: S <=32 Should not be used to treat pyelonephritis      -  Piperacillin/Tazobactam: S <=8      -  Tobramycin: S <=2      -  Trimethoprim/Sulfamethoxazole: S <=0.5/9.5        Medications:  acetaminophen     Tablet .. 650 milliGRAM(s) Oral every 6 hours PRN  albuterol    90 MICROgram(s) HFA Inhaler 2 Puff(s) Inhalation every 6 hours PRN  atorvastatin 80 milliGRAM(s) Oral at bedtime  bisacodyl Suppository 10 milliGRAM(s) Rectal at bedtime  calcitriol   Capsule 0.25 MICROGram(s) Oral daily  calcium acetate 1334 milliGRAM(s) Oral every 12 hours  cholecalciferol 1000 Unit(s) Oral daily  folic acid 1 milliGRAM(s) Oral daily  isosorbide   mononitrate ER Tablet (IMDUR) 30 milliGRAM(s) Oral daily  levoFLOXacin IVPB 500 milliGRAM(s) IV Intermittent every 48 hours  metoprolol succinate ER 50 milliGRAM(s) Oral daily  mirtazapine 7.5 milliGRAM(s) Oral daily  montelukast 10 milliGRAM(s) Oral daily  ondansetron Injectable 4 milliGRAM(s) IV Push every 6 hours PRN  pantoprazole  Injectable 40 milliGRAM(s) IV Push once  polyethylene glycol 3350 17 Gram(s) Oral daily  senna 2 Tablet(s) Oral at bedtime  sertraline 25 milliGRAM(s) Oral daily  simethicone 80 milliGRAM(s) Chew every 6 hours PRN  sodium bicarbonate 650 milliGRAM(s) Oral every 12 hours  traMADol 25 milliGRAM(s) Oral every 8 hours PRN        Assessment and Plan:  Patient is an 89 y/o Female with  PMHx HTN, h/o DVT/ PE 2018 on ac, HFrEF s/p AICD, RA, CKD V, recent uti here with altered mental status, progressive CKD, with prep for HD tx.       # Altered mental status due to acute metabolic encephalopathy; in setting of recent uti- resolved   +fever; possible gastroenteritis  cth neg  ct abd with bladder wall thickening, enteritis  ucxs  ecoli- pan sensitive, patient was given 1st dose of IV Levaquin on 3/22, 2nd dose on 3/27, 3rd dose on 3/29, 4th dose will receive on 3/31/24.   blood cxs negative      # OMERO on  Chronic kidney disease (CKD), stage V. - worsening Creatinine level, started prep. for HD , f/up with Surgical team to place HD access , with possible HD tx. on monday  , f/up Nephrology team for rec.   - continue calcitriol 0.25, bicarb 650 bid, phoslo 1334 bid  - daily BMP, renal diet, strict I/O chart, avoid nephrotoxic agents     # Abdominal pain- added on Simethicone, PPI, IV Zofran prn, laxatives regimen. pain management prn.   - post KUB-non-obstructive gas pattern    # HTN - soft b/p in am hold norvasc 5,  continue imdur 30, toprol 50.    # DVT, lower extremity. on eliquis tx. - on hold for HD access placement on monday       #  Chronic HFrEF (heart failure with reduced ejection fraction). clinically in euvolemic state  outpt Cardiology f/up     ## H/O rheumatoid arthritis.   ·  Plan: outpt f/u.    Code status: DNR/DNI     #Progress Note Handoff  Pending : complete abx. tx., f/up Surgical team to place HD access on monday, Eliquis is being held ,  daily CBC, BMP , mild abd. pain- clinical obs. tx. as above. , NPO post midnight   Family discussion: yes, medical team   Disposition: to be determined     Total time spent to complete patient's bedside assessment, review medical chart, discuss medical plan of care with covering medical team was more than 35 minutes with >50% of time spent face to face with patient, discussion with patient/family and/or coordination of care

## 2024-04-01 LAB
ALBUMIN SERPL ELPH-MCNC: 2.8 G/DL — LOW (ref 3.5–5.2)
ALP SERPL-CCNC: 92 U/L — SIGNIFICANT CHANGE UP (ref 30–115)
ALT FLD-CCNC: 15 U/L — SIGNIFICANT CHANGE UP (ref 0–41)
ANION GAP SERPL CALC-SCNC: 12 MMOL/L — SIGNIFICANT CHANGE UP (ref 7–14)
APTT BLD: 33.3 SEC — SIGNIFICANT CHANGE UP (ref 27–39.2)
AST SERPL-CCNC: 31 U/L — SIGNIFICANT CHANGE UP (ref 0–41)
BASOPHILS # BLD AUTO: 0.05 K/UL — SIGNIFICANT CHANGE UP (ref 0–0.2)
BASOPHILS NFR BLD AUTO: 0.5 % — SIGNIFICANT CHANGE UP (ref 0–1)
BILIRUB SERPL-MCNC: 0.2 MG/DL — SIGNIFICANT CHANGE UP (ref 0.2–1.2)
BUN SERPL-MCNC: 81 MG/DL — CRITICAL HIGH (ref 10–20)
CALCIUM SERPL-MCNC: 8.2 MG/DL — LOW (ref 8.4–10.5)
CHLORIDE SERPL-SCNC: 108 MMOL/L — SIGNIFICANT CHANGE UP (ref 98–110)
CO2 SERPL-SCNC: 24 MMOL/L — SIGNIFICANT CHANGE UP (ref 17–32)
CREAT SERPL-MCNC: 6.2 MG/DL — CRITICAL HIGH (ref 0.7–1.5)
CULTURE RESULTS: SIGNIFICANT CHANGE UP
CULTURE RESULTS: SIGNIFICANT CHANGE UP
EGFR: 6 ML/MIN/1.73M2 — LOW
EOSINOPHIL # BLD AUTO: 0.43 K/UL — SIGNIFICANT CHANGE UP (ref 0–0.7)
EOSINOPHIL NFR BLD AUTO: 4.1 % — SIGNIFICANT CHANGE UP (ref 0–8)
GLUCOSE SERPL-MCNC: 93 MG/DL — SIGNIFICANT CHANGE UP (ref 70–99)
HCT VFR BLD CALC: 26.2 % — LOW (ref 37–47)
HGB BLD-MCNC: 8.3 G/DL — LOW (ref 12–16)
IMM GRANULOCYTES NFR BLD AUTO: 0.4 % — HIGH (ref 0.1–0.3)
INR BLD: 1.3 RATIO — SIGNIFICANT CHANGE UP (ref 0.65–1.3)
LYMPHOCYTES # BLD AUTO: 1.77 K/UL — SIGNIFICANT CHANGE UP (ref 1.2–3.4)
LYMPHOCYTES # BLD AUTO: 16.9 % — LOW (ref 20.5–51.1)
MAGNESIUM SERPL-MCNC: 1.9 MG/DL — SIGNIFICANT CHANGE UP (ref 1.8–2.4)
MCHC RBC-ENTMCNC: 28.3 PG — SIGNIFICANT CHANGE UP (ref 27–31)
MCHC RBC-ENTMCNC: 31.7 G/DL — LOW (ref 32–37)
MCV RBC AUTO: 89.4 FL — SIGNIFICANT CHANGE UP (ref 81–99)
MONOCYTES # BLD AUTO: 1.15 K/UL — HIGH (ref 0.1–0.6)
MONOCYTES NFR BLD AUTO: 11 % — HIGH (ref 1.7–9.3)
NEUTROPHILS # BLD AUTO: 7.01 K/UL — HIGH (ref 1.4–6.5)
NEUTROPHILS NFR BLD AUTO: 67.1 % — SIGNIFICANT CHANGE UP (ref 42.2–75.2)
NRBC # BLD: 0 /100 WBCS — SIGNIFICANT CHANGE UP (ref 0–0)
PLATELET # BLD AUTO: 169 K/UL — SIGNIFICANT CHANGE UP (ref 130–400)
PMV BLD: 9.7 FL — SIGNIFICANT CHANGE UP (ref 7.4–10.4)
POTASSIUM SERPL-MCNC: 4.7 MMOL/L — SIGNIFICANT CHANGE UP (ref 3.5–5)
POTASSIUM SERPL-SCNC: 4.7 MMOL/L — SIGNIFICANT CHANGE UP (ref 3.5–5)
PROT SERPL-MCNC: 4.8 G/DL — LOW (ref 6–8)
PROTHROM AB SERPL-ACNC: 14.9 SEC — HIGH (ref 9.95–12.87)
RBC # BLD: 2.93 M/UL — LOW (ref 4.2–5.4)
RBC # FLD: 13.7 % — SIGNIFICANT CHANGE UP (ref 11.5–14.5)
SODIUM SERPL-SCNC: 144 MMOL/L — SIGNIFICANT CHANGE UP (ref 135–146)
SPECIMEN SOURCE: SIGNIFICANT CHANGE UP
SPECIMEN SOURCE: SIGNIFICANT CHANGE UP
WBC # BLD: 10.45 K/UL — SIGNIFICANT CHANGE UP (ref 4.8–10.8)
WBC # FLD AUTO: 10.45 K/UL — SIGNIFICANT CHANGE UP (ref 4.8–10.8)

## 2024-04-01 PROCEDURE — 76937 US GUIDE VASCULAR ACCESS: CPT | Mod: 26

## 2024-04-01 PROCEDURE — 36558 INSERT TUNNELED CV CATH: CPT | Mod: RT

## 2024-04-01 PROCEDURE — 77001 FLUOROGUIDE FOR VEIN DEVICE: CPT | Mod: 26

## 2024-04-01 PROCEDURE — 99232 SBSQ HOSP IP/OBS MODERATE 35: CPT

## 2024-04-01 RX ORDER — VANCOMYCIN HCL 1 G
750 VIAL (EA) INTRAVENOUS ONCE
Refills: 0 | Status: COMPLETED | OUTPATIENT
Start: 2024-04-01 | End: 2024-04-01

## 2024-04-01 RX ORDER — CALCIUM CARBONATE 500(1250)
2 TABLET ORAL ONCE
Refills: 0 | Status: COMPLETED | OUTPATIENT
Start: 2024-04-01 | End: 2024-04-01

## 2024-04-01 RX ADMIN — ATORVASTATIN CALCIUM 80 MILLIGRAM(S): 80 TABLET, FILM COATED ORAL at 21:49

## 2024-04-01 RX ADMIN — TRAMADOL HYDROCHLORIDE 25 MILLIGRAM(S): 50 TABLET ORAL at 18:10

## 2024-04-01 RX ADMIN — Medication 250 MILLIGRAM(S): at 11:22

## 2024-04-01 RX ADMIN — TRAMADOL HYDROCHLORIDE 25 MILLIGRAM(S): 50 TABLET ORAL at 19:00

## 2024-04-01 RX ADMIN — Medication 2 TABLET(S): at 18:59

## 2024-04-01 RX ADMIN — Medication 10 MILLIGRAM(S): at 21:50

## 2024-04-01 RX ADMIN — SENNA PLUS 2 TABLET(S): 8.6 TABLET ORAL at 21:49

## 2024-04-01 NOTE — PROGRESS NOTE ADULT - SUBJECTIVE AND OBJECTIVE BOX
Nephrology progress note    Patient is seen and examined, events over the last 24 h noted .    Allergies:  Keflex (Swelling)  cephalosporins (Angioedema)    Hospital Medications:   MEDICATIONS  (STANDING):  atorvastatin 80 milliGRAM(s) Oral at bedtime  bisacodyl Suppository 10 milliGRAM(s) Rectal at bedtime  calcitriol   Capsule 0.25 MICROGram(s) Oral daily  calcium acetate 1334 milliGRAM(s) Oral every 12 hours  cholecalciferol 1000 Unit(s) Oral daily  folic acid 1 milliGRAM(s) Oral daily  isosorbide   mononitrate ER Tablet (IMDUR) 30 milliGRAM(s) Oral daily  levoFLOXacin IVPB 500 milliGRAM(s) IV Intermittent every 48 hours  metoprolol succinate ER 50 milliGRAM(s) Oral daily  mirtazapine 7.5 milliGRAM(s) Oral daily  montelukast 10 milliGRAM(s) Oral daily  polyethylene glycol 3350 17 Gram(s) Oral daily  senna 2 Tablet(s) Oral at bedtime  sertraline 25 milliGRAM(s) Oral daily  sodium bicarbonate 650 milliGRAM(s) Oral every 12 hours  vancomycin  IVPB 750 milliGRAM(s) IV Intermittent once        VITALS:  T(F): 98 (04-01-24 @ 13:05), Max: 98.7 (04-01-24 @ 05:41)  HR: 66 (04-01-24 @ 13:35)  BP: 115/62 (04-01-24 @ 13:35)  RR: 16 (04-01-24 @ 13:35)  SpO2: 100% (04-01-24 @ 13:35)  Wt(kg): --    Height (cm): 160 (04-01 @ 10:48)  Weight (kg): 57.1 (04-01 @ 10:48)  BMI (kg/m2): 22.3 (04-01 @ 10:48)  BSA (m2): 1.59 (04-01 @ 10:48)    PHYSICAL EXAM:    Respiratory: CTAB, no wheezes, rales or rhonchi  Cardiovascular: S1, S2, RRR  Gastrointestinal: BS+, soft, NT/ND    LABS:  04-01    144  |  108  |  81<HH>  ----------------------------<  93  4.7   |  24  |  6.2<HH>    Ca    8.2<L>      01 Apr 2024 06:46  Mg     1.9     04-01    TPro  4.8<L>  /  Alb  2.8<L>  /  TBili  0.2  /  DBili      /  AST  31  /  ALT  15  /  AlkPhos  92  04-01                          8.3    10.45 )-----------( 169      ( 01 Apr 2024 06:46 )             26.2       Urine Studies:  Urinalysis Basic - ( 01 Apr 2024 06:46 )    Color:  / Appearance:  / SG:  / pH:   Gluc: 93 mg/dL / Ketone:   / Bili:  / Urobili:    Blood:  / Protein:  / Nitrite:    Leuk Esterase:  / RBC:  / WBC    Sq Epi:  / Non Sq Epi:  / Bacteria:          Nephrology progress note    Patient is seen and examined, events over the last 24 h noted .    Allergies:  Keflex (Swelling)  cephalosporins (Angioedema)    Hospital Medications:   MEDICATIONS  (STANDING):  atorvastatin 80 milliGRAM(s) Oral at bedtime  bisacodyl Suppository 10 milliGRAM(s) Rectal at bedtime  calcitriol   Capsule 0.25 MICROGram(s) Oral daily  calcium acetate 1334 milliGRAM(s) Oral every 12 hours  cholecalciferol 1000 Unit(s) Oral daily  folic acid 1 milliGRAM(s) Oral daily  isosorbide   mononitrate ER Tablet (IMDUR) 30 milliGRAM(s) Oral daily  levoFLOXacin IVPB 500 milliGRAM(s) IV Intermittent every 48 hours  metoprolol succinate ER 50 milliGRAM(s) Oral daily  mirtazapine 7.5 milliGRAM(s) Oral daily  montelukast 10 milliGRAM(s) Oral daily  polyethylene glycol 3350 17 Gram(s) Oral daily  senna 2 Tablet(s) Oral at bedtime  sertraline 25 milliGRAM(s) Oral daily  sodium bicarbonate 650 milliGRAM(s) Oral every 12 hours  vancomycin  IVPB 750 milliGRAM(s) IV Intermittent once        VITALS:  T(F): 98 (04-01-24 @ 13:05), Max: 98.7 (04-01-24 @ 05:41)  HR: 66 (04-01-24 @ 13:35)  BP: 115/62 (04-01-24 @ 13:35)  RR: 16 (04-01-24 @ 13:35)  SpO2: 100% (04-01-24 @ 13:35)  Wt(kg): --    Height (cm): 160 (04-01 @ 10:48)  Weight (kg): 57.1 (04-01 @ 10:48)  BMI (kg/m2): 22.3 (04-01 @ 10:48)  BSA (m2): 1.59 (04-01 @ 10:48)    PHYSICAL EXAM:    Respiratory: CTAB, no wheezes, rales or rhonchi  Cardiovascular: S1, S2, RRR  Gastrointestinal: BS+, soft, NT/ND  Vascular access: R IJ TDC       LABS:  04-01    144  |  108  |  81<HH>  ----------------------------<  93  4.7   |  24  |  6.2<HH>    Ca    8.2<L>      01 Apr 2024 06:46  Mg     1.9     04-01    TPro  4.8<L>  /  Alb  2.8<L>  /  TBili  0.2  /  DBili      /  AST  31  /  ALT  15  /  AlkPhos  92  04-01                          8.3    10.45 )-----------( 169      ( 01 Apr 2024 06:46 )             26.2

## 2024-04-01 NOTE — PROGRESS NOTE ADULT - ASSESSMENT
RACHEL MARRUFO is a 87 yo woman with h/o htn, DVT/PE in 2018 and in 2019, HFrEF s/p aicd and ckd V who p/w AMS and abdominal pain after being discharged from osh ([facility]) on 3/11 to complete macrobid abx course and now p/w ams and confusion with OMERO on CKD5    #Metabolic encephalopathy, improved  #OMERO on CKD stage V--2/2 dehydration and poor oral intake  #Resolved Cystitis  #Diarrhea---Enteritis  #HFrEF -- s/p Aicd  #h/o DVT/PE  - tele monitoring  - continue to monitor bmp closely  - nephrology following--- TDC placed by IR 4/1 and  RRT done/ doubt will change her overall prognosis,    - started vit D  calcitriol 0.25 mcg po q24h   - hold nephrotoxic meds  - closely monitor vs-----can hold additional bp meds if becomes hypotensive (both entresto and lasix on hold---restart as cr and bp tolerates)  - lokelma 10g q24h for elevated potassium  - check stool study pending collection  - eps interrogated device   - continue eliquis 2.5 mg bid -> held on 3/29 for prospective TDC placement  - f/u ua/ucr/ulytes -> wnl   - primafit to monitor and document I/o  - repeat UA neg  - repeat Blood cultures neg  - palliative care GOC conversation continuing with HCP -> HCP and patient want dialysis as per last palliative note  - IR: TDC placement 4/1  - Strike ins and outs    #suspected pneumonia  #perihilar opacities  - CXR showing perihilar opacities in setting of overnight fever and tachycardia  - renally dose Levofloxacin 500mg q48h x 5 days  - increased WBC count; trend WBC    # HTN  amLODIPine   Tablet 5 milliGRAM(s) Oral at bedtime  isosorbide   mononitrate ER Tablet (IMDUR) 30 milliGRAM(s) Oral daily  metoprolol succinate ER 50 milliGRAM(s) Oral daily    # Hx of dvt/pe  - on apixaban 2.5 po bid -> on hold for prospective TDC placement    # HLD  atorvastatin 80 milliGRAM(s) Oral at bedtime     # Depression   - strated mirtazapine 7.5 milliGRAM(s) Oral daily for mood and appetite stimulation  - sertraline 25 milliGRAM(s) Oral daily    #Diet: Pureed renal restricted   #DVT ppx: Eliquis on hold for prospective TDC procedure  #GI ppx: Not indicated  #Activity: IAT  #HCP: Leona Marrufo - [phone]

## 2024-04-01 NOTE — PROGRESS NOTE ADULT - SUBJECTIVE AND OBJECTIVE BOX
GRACIESUNNYRANDNA  [facility] 022 B ([facility])    pt seen this am   - no overnight events notified   - scheduled for tunnel catheter with IR today         Vital Signs Last 24 Hrs  T(C): 36.9 (01 Apr 2024 17:27), Max: 37.1 (01 Apr 2024 05:41)  T(F): 98.4 (01 Apr 2024 17:27), Max: 98.7 (01 Apr 2024 05:41)  HR: 80 (01 Apr 2024 17:27) (66 - 80)  BP: 161/74 (01 Apr 2024 17:27) (115/62 - 165/85)  BP(mean): 95 (01 Apr 2024 10:48) (95 - 95)  RR: 18 (01 Apr 2024 17:27) (16 - 18)  SpO2: 100% (01 Apr 2024 15:55) (98% - 100%)    Parameters below as of 01 Apr 2024 15:55  Patient On (Oxygen Delivery Method): nasal cannula  O2 Flow (L/min): 3      PHYSICAL EXAM:  General: Not in distress - comfortable   HEENT: AT, NC, Supple, NO JVD, NO CB  Lungs: CTA B/L, no wheezing, no rhonchi  CVS: normal S1, S2, RRR, NO M/G/R  Abdomen: soft abdomen   Neuro: no acute focal neurological deficits, generalized body weakness                          8.3    10.45 )-----------( 169      ( 01 Apr 2024 06:46 )             26.2   04-01    144  |  108  |  81<HH>  ----------------------------<  93  4.7   |  24  |  6.2<HH>    Ca    8.2<L>      01 Apr 2024 06:46  Mg     1.9     04-01    TPro  4.8<L>  /  Alb  2.8<L>  /  TBili  0.2  /  DBili  x   /  AST  31  /  ALT  15  /  AlkPhos  92  04-01    MEDICATIONS  (STANDING):  atorvastatin 80 milliGRAM(s) Oral at bedtime  bisacodyl Suppository 10 milliGRAM(s) Rectal at bedtime  calcitriol   Capsule 0.25 MICROGram(s) Oral daily  calcium acetate 1334 milliGRAM(s) Oral every 12 hours  cholecalciferol 1000 Unit(s) Oral daily  folic acid 1 milliGRAM(s) Oral daily  isosorbide   mononitrate ER Tablet (IMDUR) 30 milliGRAM(s) Oral daily  levoFLOXacin IVPB 500 milliGRAM(s) IV Intermittent every 48 hours  metoprolol succinate ER 50 milliGRAM(s) Oral daily  mirtazapine 7.5 milliGRAM(s) Oral daily  montelukast 10 milliGRAM(s) Oral daily  polyethylene glycol 3350 17 Gram(s) Oral daily  senna 2 Tablet(s) Oral at bedtime  sertraline 25 milliGRAM(s) Oral daily  sodium bicarbonate 650 milliGRAM(s) Oral every 12 hours    MEDICATIONS  (PRN):  acetaminophen     Tablet .. 650 milliGRAM(s) Oral every 6 hours PRN Mild Pain (1 - 3)  albuterol    90 MICROgram(s) HFA Inhaler 2 Puff(s) Inhalation every 6 hours PRN for shortness of breath and/or wheezing  ondansetron Injectable 4 milliGRAM(s) IV Push every 6 hours PRN Nausea and/or Vomiting  simethicone 80 milliGRAM(s) Chew every 6 hours PRN Gas  traMADol 25 milliGRAM(s) Oral every 8 hours PRN Moderate Pain (4 - 6)    # Altered mental status due to acute metabolic encephalopathy; in setting of recent uti- resolved   +fever; possible gastroenteritis  cth neg  ct abd with bladder wall thickening, enteritis  ucxs  ecoli- pan sensitive, patient was given 1st dose of IV Levaquin on 3/22, 2nd dose on 3/27, 3rd dose on 3/29, 4th dose will receive on 3/31/24.   blood cxs negative      # OMERO on  Chronic kidney disease (CKD), stage V. - worsening Creatinine level, started prep. for HD   - IR guided tunnel cath today - HD thereafter -Nephro following    - continue calcitriol 0.25, bicarb 650 bid, phoslo 1334 bid  - daily BMP, renal diet, strict I/O chart, avoid nephrotoxic agents     # Abdominal pain- added on Simethicone, PPI, IV Zofran prn, laxatives regimen. pain management prn.   - post KUB-non-obstructive gas pattern    # HTN - soft b/p in am hold norvasc 5,  continue imdur 30, toprol 50.    # DVT, lower extremity. on eliquis tx. - on hold for HD access placement on monday       #  Chronic HFrEF (heart failure with reduced ejection fraction). clinically in euvolemic state  outpt Cardiology f/up     ## H/O rheumatoid arthritis.   ·  Plan: outpt f/u.    Code status: DNR/DNI     #Progress Note Handoff  Pending : HD initiation this admission   Family discussion: staff updated family   Disposition: to be determined     Attending Physician Dr. Judy Saldaña # [id number]

## 2024-04-01 NOTE — PROGRESS NOTE ADULT - ATTENDING COMMENTS
Patient with OMERO on advanced CKD s/p tunneled dialysis catheter placement today, will have first HD session today   rest of plan as above

## 2024-04-01 NOTE — PROGRESS NOTE ADULT - SUBJECTIVE AND OBJECTIVE BOX
HPI:  87yo  female patient ([employer]) with hx of HTN, DVT/PE 2018 (thought provoked by travel) completed 6m of AC then 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, NICM - HFrEF(LVEF 35-40% in 9/22) s/p AICD, RA, CKD stage 5   --> currently on Macrobid for UTI (pt was discharge from [facility] 3/11 papers in her chart)  presents to the ED for fluctuating mental status and abdominal pain with occasional CP.  PAtient states she has lower abdominal pain with persistent dysuria. She says she had CP but doesn't remember when and how it was.  Per the ED note, patient's grandson said she is normally AAOx3 with good cognition but since her hospitalization she has not been herself. Tonight they tried to get her ready for bed and she was combative in trying to get her in her pajamas so he brought her to the ED.     ED vitals normal. labs showed hb 10 (at BL), Na 127, K 6.5 hemolyzed --> 4.9 on vbg, AG 16, BUN/CRea 119/4.8, , AST 53,   trop 74 --> 78, pro BNP 12.9K   EKG: NSR, vpaced, new TWI in inferolateral leads     CT abd pelv: 1. Prominent fluid-filled loops of small bowel with mild mucosal enhancement, may represent enteritis in the appropriate clinical setting.  2. The urinary bladder is thickened consistent with patient's current urinary tract infection.  Other chronic/incidental findings as above.  CTH: No acute intracranial pathology.    patient received levaquin and was admitted to telemetry for AMS and chest pain w/u and management.  (22 Mar 2024 04:02)        Primary diagnosis of OMERO (acute kidney injury)        24H events:    Today is hospital day 10d. This morning patient was seen and examined at bedside, resting comfortably in bed.    No acute or major events overnight.    Code Status:    Family communication:  Contact date:  Name of person contacted:  Relationship to patient:  Communication details:  What matters most:    PAST MEDICAL & SURGICAL HISTORY  Chronic kidney disease    Hypertension    Gout    Diverticulitis  sigmoid    Rheumatoid arthritis    DVT, lower extremity  Bilateral    Pulmonary embolism    Chronic HFrEF (heart failure with reduced ejection fraction)    AICD (automatic cardioverter/defibrillator) present    Artificial pacemaker    History of appendectomy    History of tonsillectomy    History of hysterectomy    H/O hernia repair      SOCIAL HISTORY:  Social History:      ALLERGIES:  Keflex (Swelling)  cephalosporins (Angioedema)    MEDICATIONS:  STANDING MEDICATIONS  atorvastatin 80 milliGRAM(s) Oral at bedtime  bisacodyl Suppository 10 milliGRAM(s) Rectal at bedtime  calcitriol   Capsule 0.25 MICROGram(s) Oral daily  calcium acetate 1334 milliGRAM(s) Oral every 12 hours  cholecalciferol 1000 Unit(s) Oral daily  folic acid 1 milliGRAM(s) Oral daily  isosorbide   mononitrate ER Tablet (IMDUR) 30 milliGRAM(s) Oral daily  levoFLOXacin IVPB 500 milliGRAM(s) IV Intermittent every 48 hours  metoprolol succinate ER 50 milliGRAM(s) Oral daily  mirtazapine 7.5 milliGRAM(s) Oral daily  montelukast 10 milliGRAM(s) Oral daily  polyethylene glycol 3350 17 Gram(s) Oral daily  senna 2 Tablet(s) Oral at bedtime  sertraline 25 milliGRAM(s) Oral daily  sodium bicarbonate 650 milliGRAM(s) Oral every 12 hours    PRN MEDICATIONS  acetaminophen     Tablet .. 650 milliGRAM(s) Oral every 6 hours PRN  albuterol    90 MICROgram(s) HFA Inhaler 2 Puff(s) Inhalation every 6 hours PRN  ondansetron Injectable 4 milliGRAM(s) IV Push every 6 hours PRN  simethicone 80 milliGRAM(s) Chew every 6 hours PRN  traMADol 25 milliGRAM(s) Oral every 8 hours PRN    VITALS:   T(F): 99.3  HR: 87  BP: 168/78  RR: 18  SpO2: 100%    PHYSICAL EXAM: pt left for TDC placement and then to HD  GENERAL:   (  ) NAD, lying in bed comfortably     (  ) obtunded     (  ) lethargic     (  ) somnolent    HEAD:   (  ) Atraumatic     (  ) hematoma     (  ) laceration (specify location:       )     NECK:  (  ) Supple     (  ) neck stiffness     (  ) nuchal rigidity     (  )  no JVD     (  ) JVD present ( -- cm)    HEART:  Rate -->     (  ) normal rate     (  ) bradycardic     (  ) tachycardic  Rhythm -->     (  ) regular     (  ) regularly irregular     (  ) irregularly irregular  Murmurs -->     (  ) normal s1s2     (  ) systolic murmur     (  ) diastolic murmur     (  ) continuous murmur      (  ) S3 present     (  ) S4 present    LUNGS:   (  )Unlabored respirations     (  ) tachypnea  (  ) B/L air entry     (  ) decreased breath sounds in:  (location     )    (  ) no adventitious sound     (  ) crackles     (  ) wheezing      (  ) rhonchi      (specify location:       )  (  ) chest wall tenderness (specify location:       )    ABDOMEN:   (  ) Soft     (  ) tense   |   (  ) nondistended     (  ) distended   |   (  ) +BS     (  ) hypoactive bowel sounds     (  ) hyperactive bowel sounds  (  ) nontender     (  ) RUQ tenderness     (  ) RLQ tenderness     (  ) LLQ tenderness     (  ) epigastric tenderness     (  ) diffuse tenderness  (  ) Splenomegaly      (  ) Hepatomegaly      (  ) Jaundice     (  ) ecchymosis     EXTREMITIES:  (  ) Normal     (  ) Rash     (  ) ecchymosis     (  ) varicose veins      (  ) pitting edema     (  ) non-pitting edema   (  ) ulceration     (  ) gangrene:     (location:     )    NERVOUS SYSTEM:    (  ) A&Ox3     (  ) confused     (  ) lethargic  CN II-XII:     (  ) Intact     (  ) deficits found     (Specify:     )   Upper extremities:     (  ) no sensorimotor deficits     (  ) weakness     (  ) loss of proprioception/vibration     (  ) loss of touch/temperature (specify:    )  Lower extremities:     (  ) no sensorimotor deficits     (  ) weakness     (  ) loss of proprioception/vibration     (  ) loss of touch/temperature (specify:    )    SKIN:   (  ) No rashes or lesions     (  ) maculopapular rash     (  ) pustules     (  ) vesicles     (  ) ulcer     (  ) ecchymosis     (specify location:     )    AMPAC score:    (  ) Indwelling Morgan Catheter:   Date insterted:    Reason (  ) Critical illness     (  ) urinary retention    (  ) Accurate Ins/Outs Monitoring     (  ) CMO patient    (  ) Central Line:   Date inserted:  Location: (  ) Right IJ     (  ) Left IJ     (  ) Right Fem     (  ) Left Fem    (  ) SPC        (  ) pigtail       (  ) PEG tube       (  ) colostomy       (  ) jejunostomy  (  ) U-Dall    LABS:                        8.3    10.45 )-----------( 169      ( 01 Apr 2024 06:46 )             26.2     04-01    144  |  108  |  81<HH>  ----------------------------<  93  4.7   |  24  |  6.2<HH>    Ca    8.2<L>      01 Apr 2024 06:46  Mg     1.9     04-01    TPro  4.8<L>  /  Alb  2.8<L>  /  TBili  0.2  /  DBili  x   /  AST  31  /  ALT  15  /  AlkPhos  92  04-01    PT/INR - ( 01 Apr 2024 06:46 )   PT: 14.90 sec;   INR: 1.30 ratio         PTT - ( 01 Apr 2024 06:46 )  PTT:33.3 sec  Urinalysis Basic - ( 01 Apr 2024 06:46 )    Color: x / Appearance: x / SG: x / pH: x  Gluc: 93 mg/dL / Ketone: x  / Bili: x / Urobili: x   Blood: x / Protein: x / Nitrite: x   Leuk Esterase: x / RBC: x / WBC x   Sq Epi: x / Non Sq Epi: x / Bacteria: x

## 2024-04-01 NOTE — CHART NOTE - NSCHARTNOTESELECT_GEN_ALL_CORE
Electrophysiology PA Note
EP/Event Note
Event Note
Palliative Care- Social Work/Event Note
Transfer Note

## 2024-04-01 NOTE — CHART NOTE - NSCHARTNOTEFT_GEN_A_CORE
PACU ANESTHESIA ADMISSION NOTE      Procedure:   Post op diagnosis:      ____  Intubated  TV:______       Rate: ______      FiO2: ______    _x___  Patent Airway    __x__  Full return of protective reflexes    x____  Full recovery from anesthesia / back to baseline     Vitals:   T:  98         R: 16                 /60:                  Sat: 97                  P: 72      Mental Status:  x____ Awake   _____ Alert   _____ Drowsy   _____ Sedated    Nausea/Vomiting:  ____ NO  ______Yes,   See Post - Op Orders          Pain Scale (0-10):  _____    Treatment: ____ None    ____ See Post - Op/PCA Orders    Post - Operative Fluids:   ____ Oral   ____ See Post - Op Orders    Plan: Discharge:   ____Home      x _____Floor     _____Critical Care    _____  Other:_________________    Comments:

## 2024-04-01 NOTE — PRE PROCEDURE NOTE - PRE PROCEDURE EVALUATION
PREOPERATIVE DAY OF PROCEDURE EVALUATION:     I have personally seen and examined this patient. I agree with the history and physical which I have reviewed and noted any changes below:     Plan is for Right tunneled dialysis catheter placement    Procedure/ risks/ benefits/ goals/ alternatives were explained. All questions answered. Informed content obtained from patient. Consent placed in chart.

## 2024-04-01 NOTE — PROGRESS NOTE ADULT - ASSESSMENT
79 yo  female patient ([employer]) with hx of CKD stage 5, HTN, DVT/PE 2018 (thought provoked by travel) completed 6m of AC then 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, NICM - HFrEF(LVEF 35-40% in 9/22) s/p AICD, RA, CKD stage 5. Currently on Macrobid for UTI (pt was discharge from [facility] 3/11 papers in her chart) presents to the ED for fluctuating mental status and abdominal pain with occasional CP.  Nephrology was consulted for OMERO over CKD stage 5.    # OMERO on CKD stage 5  Likely ATN precipitated by pre-renal etiology  - S/p TDC today and will do first session of HD today. Consent for HD take from the son.   - avoid nephrotoxic agents  - continue sodium bicarbonate 650 q 12   - dose medications for eGFR <15    # MBD  - continue phoslo 1334mg TID and calcitriol 0.25 mcg po q24h     # anemia of ckd  Hgb 9.2  - check Ferritin and Iron Sat  - patient is [employer], will not accept blood products      # HTN  Controlled  - continue Amlodipine 5mg daily and Metoprolol ER 50mg daily    Will follow 81 yo  female patient (G[employer]ism) with hx of CKD stage 5, HTN, DVT/PE 2018 (thought provoked by travel) completed 6m of AC then 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, NICM - HFrEF(LVEF 35-40% in 9/22) s/p AICD, RA, CKD stage 5. Currently on Macrobid for UTI (pt was discharge from [facility] 3/11 papers in her chart) presents to the ED for fluctuating mental status and abdominal pain with occasional CP.  Nephrology was consulted for OMERO over CKD stage 5.    # OMERO on CKD stage 5  Likely ATN precipitated by pre-renal etiology  - S/p TDC today and will do first session of HD today. Consent for HD take from the son.   - will have 2 hr HD session, low flows 0.5L UF  - next HD tomorrow  - avoid nephrotoxic agents  - d/c sodium bicarb po once HD is initiated  - dose medications for iHD    # MBD  - continue phoslo  - d/c calcitriol  - check phos level     # anemia of CKD  - check Ferritin and Iron Sat  - patient is [employer]ism, will not accept blood products      # HTN  Controlled  - d/c amlodipine on HD days (assume TTS for now) to allow for UF    Will follow 81 yo  female patient ([employer]) with hx of CKD stage 5, HTN, DVT/PE 2018 (thought provoked by travel) completed 6m of AC then 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, NICM - HFrEF(LVEF 35-40% in 9/22) s/p AICD, RA, CKD stage 5. Currently on Macrobid for UTI (pt was discharge from [facility] 3/11 papers in her chart) presents to the ED for fluctuating mental status and abdominal pain with occasional CP.  Nephrology was consulted for OMERO over CKD stage 5.    # OMERO on CKD stage 5  Likely ATN precipitated by pre-renal etiology  - S/p TDC today and will do first session of HD today. Consent for HD take from the son.   - will have 2 hr HD session, low flows 0.5L UF  - next HD tomorrow  - avoid nephrotoxic agents  - d/c sodium bicarb po once HD is initiated  - dose medications for iHD    # MBD  - continue phoslo  - d/c calcitriol  - check phos level     # anemia of CKD  - check Ferritin and Iron Sat  - patient is [employer], will not accept blood products      # HTN  Controlled  - d/c amlodipine on HD days (assume TTS for now) to allow for UF    Needs [facility]  Will follow

## 2024-04-02 LAB
ALBUMIN SERPL ELPH-MCNC: 2.6 G/DL — LOW (ref 3.5–5.2)
ALP SERPL-CCNC: 82 U/L — SIGNIFICANT CHANGE UP (ref 30–115)
ALT FLD-CCNC: 14 U/L — SIGNIFICANT CHANGE UP (ref 0–41)
ANION GAP SERPL CALC-SCNC: 7 MMOL/L — SIGNIFICANT CHANGE UP (ref 7–14)
AST SERPL-CCNC: 33 U/L — SIGNIFICANT CHANGE UP (ref 0–41)
BASOPHILS # BLD AUTO: 0.04 K/UL — SIGNIFICANT CHANGE UP (ref 0–0.2)
BASOPHILS NFR BLD AUTO: 0.4 % — SIGNIFICANT CHANGE UP (ref 0–1)
BILIRUB SERPL-MCNC: 0.3 MG/DL — SIGNIFICANT CHANGE UP (ref 0.2–1.2)
BUN SERPL-MCNC: 47 MG/DL — HIGH (ref 10–20)
CALCIUM SERPL-MCNC: 7.6 MG/DL — LOW (ref 8.4–10.5)
CHLORIDE SERPL-SCNC: 108 MMOL/L — SIGNIFICANT CHANGE UP (ref 98–110)
CO2 SERPL-SCNC: 27 MMOL/L — SIGNIFICANT CHANGE UP (ref 17–32)
CREAT SERPL-MCNC: 4.1 MG/DL — CRITICAL HIGH (ref 0.7–1.5)
EGFR: 10 ML/MIN/1.73M2 — LOW
EOSINOPHIL # BLD AUTO: 0.55 K/UL — SIGNIFICANT CHANGE UP (ref 0–0.7)
EOSINOPHIL NFR BLD AUTO: 5.7 % — SIGNIFICANT CHANGE UP (ref 0–8)
GLUCOSE SERPL-MCNC: 61 MG/DL — LOW (ref 70–99)
HAV IGM SER-ACNC: SIGNIFICANT CHANGE UP
HBV CORE IGM SER-ACNC: SIGNIFICANT CHANGE UP
HBV SURFACE AG SER-ACNC: SIGNIFICANT CHANGE UP
HCT VFR BLD CALC: 25.7 % — LOW (ref 37–47)
HCV AB S/CO SERPL IA: 0.04 COI — SIGNIFICANT CHANGE UP
HCV AB S/CO SERPL IA: 0.2 S/CO — SIGNIFICANT CHANGE UP (ref 0–0.99)
HCV AB SERPL-IMP: SIGNIFICANT CHANGE UP
HCV AB SERPL-IMP: SIGNIFICANT CHANGE UP
HGB BLD-MCNC: 8 G/DL — LOW (ref 12–16)
IMM GRANULOCYTES NFR BLD AUTO: 0.4 % — HIGH (ref 0.1–0.3)
LYMPHOCYTES # BLD AUTO: 1.57 K/UL — SIGNIFICANT CHANGE UP (ref 1.2–3.4)
LYMPHOCYTES # BLD AUTO: 16.3 % — LOW (ref 20.5–51.1)
MAGNESIUM SERPL-MCNC: 1.8 MG/DL — SIGNIFICANT CHANGE UP (ref 1.8–2.4)
MCHC RBC-ENTMCNC: 28.3 PG — SIGNIFICANT CHANGE UP (ref 27–31)
MCHC RBC-ENTMCNC: 31.1 G/DL — LOW (ref 32–37)
MCV RBC AUTO: 90.8 FL — SIGNIFICANT CHANGE UP (ref 81–99)
MONOCYTES # BLD AUTO: 1.13 K/UL — HIGH (ref 0.1–0.6)
MONOCYTES NFR BLD AUTO: 11.8 % — HIGH (ref 1.7–9.3)
NEUTROPHILS # BLD AUTO: 6.28 K/UL — SIGNIFICANT CHANGE UP (ref 1.4–6.5)
NEUTROPHILS NFR BLD AUTO: 65.4 % — SIGNIFICANT CHANGE UP (ref 42.2–75.2)
NRBC # BLD: 0 /100 WBCS — SIGNIFICANT CHANGE UP (ref 0–0)
PHOSPHATE SERPL-MCNC: 2.8 MG/DL — SIGNIFICANT CHANGE UP (ref 2.1–4.9)
PLATELET # BLD AUTO: 142 K/UL — SIGNIFICANT CHANGE UP (ref 130–400)
PMV BLD: 9.9 FL — SIGNIFICANT CHANGE UP (ref 7.4–10.4)
POTASSIUM SERPL-MCNC: 4.6 MMOL/L — SIGNIFICANT CHANGE UP (ref 3.5–5)
POTASSIUM SERPL-SCNC: 4.6 MMOL/L — SIGNIFICANT CHANGE UP (ref 3.5–5)
PROT SERPL-MCNC: 4.7 G/DL — LOW (ref 6–8)
RBC # BLD: 2.83 M/UL — LOW (ref 4.2–5.4)
RBC # FLD: 13.5 % — SIGNIFICANT CHANGE UP (ref 11.5–14.5)
SODIUM SERPL-SCNC: 142 MMOL/L — SIGNIFICANT CHANGE UP (ref 135–146)
WBC # BLD: 9.61 K/UL — SIGNIFICANT CHANGE UP (ref 4.8–10.8)
WBC # FLD AUTO: 9.61 K/UL — SIGNIFICANT CHANGE UP (ref 4.8–10.8)

## 2024-04-02 PROCEDURE — 99232 SBSQ HOSP IP/OBS MODERATE 35: CPT

## 2024-04-02 RX ORDER — CHLORHEXIDINE GLUCONATE 213 G/1000ML
1 SOLUTION TOPICAL DAILY
Refills: 0 | Status: DISCONTINUED | OUTPATIENT
Start: 2024-04-02 | End: 2024-04-06

## 2024-04-02 RX ADMIN — Medication 1334 MILLIGRAM(S): at 18:23

## 2024-04-02 RX ADMIN — Medication 1334 MILLIGRAM(S): at 06:47

## 2024-04-02 RX ADMIN — Medication 650 MILLIGRAM(S): at 06:47

## 2024-04-02 RX ADMIN — Medication 1 MILLIGRAM(S): at 11:02

## 2024-04-02 RX ADMIN — POLYETHYLENE GLYCOL 3350 17 GRAM(S): 17 POWDER, FOR SOLUTION ORAL at 11:02

## 2024-04-02 RX ADMIN — Medication 1000 UNIT(S): at 11:04

## 2024-04-02 RX ADMIN — SERTRALINE 25 MILLIGRAM(S): 25 TABLET, FILM COATED ORAL at 11:02

## 2024-04-02 RX ADMIN — CHLORHEXIDINE GLUCONATE 1 APPLICATION(S): 213 SOLUTION TOPICAL at 11:05

## 2024-04-02 RX ADMIN — Medication 50 MILLIGRAM(S): at 06:47

## 2024-04-02 RX ADMIN — Medication 10 MILLIGRAM(S): at 21:52

## 2024-04-02 RX ADMIN — MONTELUKAST 10 MILLIGRAM(S): 4 TABLET, CHEWABLE ORAL at 11:02

## 2024-04-02 RX ADMIN — SENNA PLUS 2 TABLET(S): 8.6 TABLET ORAL at 21:52

## 2024-04-02 RX ADMIN — Medication 650 MILLIGRAM(S): at 18:22

## 2024-04-02 RX ADMIN — MIRTAZAPINE 7.5 MILLIGRAM(S): 45 TABLET, ORALLY DISINTEGRATING ORAL at 11:02

## 2024-04-02 RX ADMIN — CALCITRIOL 0.25 MICROGRAM(S): 0.5 CAPSULE ORAL at 11:02

## 2024-04-02 RX ADMIN — ATORVASTATIN CALCIUM 80 MILLIGRAM(S): 80 TABLET, FILM COATED ORAL at 21:52

## 2024-04-02 NOTE — PROGRESS NOTE ADULT - SUBJECTIVE AND OBJECTIVE BOX
seen and examined  24 h events noted   no distress        PAST HISTORY  --------------------------------------------------------------------------------  No significant changes to PMH, PSH, FHx, SHx, unless otherwise noted    ALLERGIES & MEDICATIONS  --------------------------------------------------------------------------------  Allergies    Keflex (Swelling)  cephalosporins (Angioedema)    Intolerances      Standing Inpatient Medications  atorvastatin 80 milliGRAM(s) Oral at bedtime  bisacodyl Suppository 10 milliGRAM(s) Rectal at bedtime  calcitriol   Capsule 0.25 MICROGram(s) Oral daily  calcium acetate 1334 milliGRAM(s) Oral every 12 hours  cholecalciferol 1000 Unit(s) Oral daily  folic acid 1 milliGRAM(s) Oral daily  isosorbide   mononitrate ER Tablet (IMDUR) 30 milliGRAM(s) Oral daily  levoFLOXacin IVPB 500 milliGRAM(s) IV Intermittent every 48 hours  metoprolol succinate ER 50 milliGRAM(s) Oral daily  mirtazapine 7.5 milliGRAM(s) Oral daily  montelukast 10 milliGRAM(s) Oral daily  polyethylene glycol 3350 17 Gram(s) Oral daily  senna 2 Tablet(s) Oral at bedtime  sertraline 25 milliGRAM(s) Oral daily  sodium bicarbonate 650 milliGRAM(s) Oral every 12 hours    PRN Inpatient Medications  acetaminophen     Tablet .. 650 milliGRAM(s) Oral every 6 hours PRN  albuterol    90 MICROgram(s) HFA Inhaler 2 Puff(s) Inhalation every 6 hours PRN  ondansetron Injectable 4 milliGRAM(s) IV Push every 6 hours PRN  simethicone 80 milliGRAM(s) Chew every 6 hours PRN  traMADol 25 milliGRAM(s) Oral every 8 hours PRN        VITALS/PHYSICAL EXAM  --------------------------------------------------------------------------------  T(C): 37.1 (04-02-24 @ 05:59), Max: 37.4 (04-01-24 @ 19:18)  HR: 88 (04-02-24 @ 05:59) (66 - 88)  BP: 150/78 (04-02-24 @ 05:59) (115/62 - 168/78)  RR: 18 (04-02-24 @ 05:59) (16 - 18)  SpO2: 100% (04-01-24 @ 15:55) (98% - 100%)  Wt(kg): --  Height (cm): 160 (04-01-24 @ 10:48)  Weight (kg): 57.1 (04-01-24 @ 10:48)  BMI (kg/m2): 22.3 (04-01-24 @ 10:48)  BSA (m2): 1.59 (04-01-24 @ 10:48)      04-01-24 @ 07:01  -  04-02-24 @ 07:00  --------------------------------------------------------  IN: 0 mL / OUT: 500 mL / NET: -500 mL      Physical Exam:  	Gen: NAD  	Pulm: decrease BS  B/L  	CV:  S1S2; no rub  	Abd: +distended  	Vascular access:tdc     LABS/STUDIES  --------------------------------------------------------------------------------              8.0    9.61  >-----------<  142      [04-02-24 @ 06:42]              25.7     142  |  108  |  47  ----------------------------<  61      [04-02-24 @ 06:42]  x    |  27  |  4.1        Ca     7.6     [04-02-24 @ 06:42]      Mg     1.8     [04-02-24 @ 06:42]      Phos  2.8     [04-02-24 @ 06:42]    TPro  4.7  /  Alb  2.6  /  TBili  0.3  /  DBili  x   /  AST  33  /  ALT  14  /  AlkPhos  82  [04-02-24 @ 06:42]    PT/INR: PT 14.90, INR 1.30       [04-01-24 @ 06:46]  PTT: 33.3       [04-01-24 @ 06:46]      Creatinine Trend:  SCr 4.1 [04-02 @ 06:42]  SCr 6.2 [04-01 @ 06:46]  SCr 6.1 [03-31 @ 08:59]  SCr 6.4 [03-30 @ 09:03]  SCr 6.0 [03-29 @ 06:33]    Urinalysis - [04-02-24 @ 06:42]      Color  / Appearance  / SG  / pH       Gluc 61 / Ketone   / Bili  / Urobili        Blood  / Protein  / Leuk Est  / Nitrite       RBC  / WBC  / Hyaline  / Gran  / Sq Epi  / Non Sq Epi  / Bacteria       PTH -- (Ca 8.2)      [03-24-24 @ 20:00]   138  Vitamin D (25OH) 9      [03-24-24 @ 20:00]  Lipid: chol 144, , HDL 39, LDL --      [03-22-24 @ 06:14]    HBsAb Nonreact      [03-27-24 @ 15:55]  HBsAg Nonreact      [03-28-24 @ 05:49]  HCV 0.16, Nonreact      [03-28-24 @ 05:49]

## 2024-04-02 NOTE — PROGRESS NOTE ADULT - SUBJECTIVE AND OBJECTIVE BOX
GRACIESUNNY RACHEL  [facility] 0[facility] B ([facility])    pt seen this am   - no overnight events notified   - s/p tunnel catheter with IR 4/1/24 - first HD session 4/1 and second session scheduled for today        Vital Signs Last 24 Hrs  T(C): 37.1 (02 Apr 2024 05:59), Max: 37.4 (01 Apr 2024 19:18)  T(F): 98.7 (02 Apr 2024 05:59), Max: 99.3 (01 Apr 2024 19:18)  HR: 71 (02 Apr 2024 11:00) (66 - 88)  BP: 138/66 (02 Apr 2024 11:00) (115/62 - 168/78)  BP(mean): --  RR: 18 (02 Apr 2024 11:00) (16 - 18)  SpO2: 100% (02 Apr 2024 11:00) (98% - 100%)    Parameters below as of 02 Apr 2024 11:00  Patient On (Oxygen Delivery Method): nasal cannula  O2 Flow (L/min): 3      PHYSICAL EXAM:  General: Not in distress - comfortable - ill appearing   HEENT: AT, NC, Supple, NO JVD, NO CB  Lungs: CTA B/L, no wheezing, no rhonchi  CVS: normal S1, S2, RRR, NO M/G/R  Abdomen: soft abdomen   Neuro: no acute focal neurological deficits, generalized body weakness                          8.0    9.61  )-----------( 142      ( 02 Apr 2024 06:42 )             25.7       04-02    142  |  108  |  47<H>  ----------------------------<  61<L>  4.6   |  27  |  4.1<HH>    Ca    7.6<L>      02 Apr 2024 06:42  Phos  2.8     04-02  Mg     1.8     04-02    TPro  4.7<L>  /  Alb  2.6<L>  /  TBili  0.3  /  DBili  x   /  AST  33  /  ALT  14  /  AlkPhos  82  04-02    MEDICATIONS  (STANDING):  atorvastatin 80 milliGRAM(s) Oral at bedtime  bisacodyl Suppository 10 milliGRAM(s) Rectal at bedtime  calcitriol   Capsule 0.25 MICROGram(s) Oral daily  calcium acetate 1334 milliGRAM(s) Oral every 12 hours  chlorhexidine 2% Cloths 1 Application(s) Topical daily  cholecalciferol 1000 Unit(s) Oral daily  folic acid 1 milliGRAM(s) Oral daily  isosorbide   mononitrate ER Tablet (IMDUR) 30 milliGRAM(s) Oral daily  metoprolol succinate ER 50 milliGRAM(s) Oral daily  mirtazapine 7.5 milliGRAM(s) Oral daily  montelukast 10 milliGRAM(s) Oral daily  polyethylene glycol 3350 17 Gram(s) Oral daily  senna 2 Tablet(s) Oral at bedtime  sertraline 25 milliGRAM(s) Oral daily  sodium bicarbonate 650 milliGRAM(s) Oral every 12 hours    MEDICATIONS  (PRN):  acetaminophen     Tablet .. 650 milliGRAM(s) Oral every 6 hours PRN Mild Pain (1 - 3)  albuterol    90 MICROgram(s) HFA Inhaler 2 Puff(s) Inhalation every 6 hours PRN for shortness of breath and/or wheezing  ondansetron Injectable 4 milliGRAM(s) IV Push every 6 hours PRN Nausea and/or Vomiting  simethicone 80 milliGRAM(s) Chew every 6 hours PRN Gas  traMADol 25 milliGRAM(s) Oral every 8 hours PRN Moderate Pain (4 - 6)      # Altered mental status due to acute metabolic encephalopathy; in setting of recent uti- resolved   +fever; possible gastroenteritis  cth neg  ct abd with bladder wall thickening, enteritis  ucxs  ecoli- pan sensitive, patient was given 1st dose of IV Levaquin on 3/22, 2nd dose on 3/27, 3rd dose on 3/29, 4th dose will receive on 3/31/24.   blood cxs negative      # OMERO on  Chronic kidney disease (CKD), stage V. - worsening Creatinine level, started prep. for HD   - s/p IR guided tunnel cath 04/01 - first HD session 04/01 -Nephro following and patient is scheduled for second Hemo session today   - continue calcitriol 0.25, bicarb 650 bid, phoslo 1334 bid  - daily BMP, renal diet, strict I/O chart, avoid nephrotoxic agents     # Abdominal pain- added on Simethicone, PPI, IV Zofran prn, laxatives regimen. pain management prn.   - post KUB-non-obstructive gas pattern    # HTN - soft b/p in am hold norvasc 5,  continue imdur 30, toprol 50.    # DVT, lower extremity. on eliquis tx. - on hold for HD access placement on monday       #  Chronic HFrEF (heart failure with reduced ejection fraction). clinically in euvolemic state  outpt Cardiology f/up     ## H/O rheumatoid arthritis.   ·  Plan: outpt f/u.    Code status: DNR/DNI     #Progress Note Handoff  Family discussion: staff updated family   Disposition: NH ?    Attending Physician Dr. Judy Saldaña # [id number]

## 2024-04-02 NOTE — PROGRESS NOTE ADULT - ASSESSMENT
RACHEL MARRUFO is a 87 yo woman with h/o htn, DVT/PE in 2018 and in 2019, HFrEF s/p aicd and ckd V who p/w AMS and abdominal pain after being discharged from osh ([facility]) on 3/11 to complete macrobid abx course and now p/w ams and confusion with OMERO on CKD5    #Metabolic encephalopathy, improved  #OMERO on CKD stage V--2/2 dehydration and poor oral intake  #Resolved Cystitis  #Diarrhea---Enteritis  #HFrEF -- s/p Aicd  #h/o DVT/PE  - tele monitoring  - continue to monitor bmp closely  - nephrology following--- TDC placed by IR 4/1 and  RRT done/ doubt will change her overall prognosis,    - started vit D  calcitriol 0.25 mcg po q24h   - hold nephrotoxic meds  - closely monitor vs-----can hold additional bp meds if becomes hypotensive (both entresto and lasix on hold---restart as cr and bp tolerates)  - lokelma 10g q24h for elevated potassium  - check stool study pending collection  - eps interrogated device   - continue eliquis 2.5 mg bid -> held on 3/29 for prospective TDC placement  - f/u ua/ucr/ulytes -> wnl   - primafit to monitor and document I/o  - repeat UA neg  - repeat Blood cultures neg  - palliative care GOC conversation continuing with HCP -> HCP and patient want dialysis as per last palliative note  - IR: TDC placement 4/1  - Strike ins and outs    #suspected pneumonia  #perihilar opacities  - CXR showing perihilar opacities in setting of overnight fever and tachycardia  - renally dose Levofloxacin 500mg q48h x 5 days  - increased WBC count; trend WBC    # HTN  amLODIPine   Tablet 5 milliGRAM(s) Oral at bedtime  isosorbide   mononitrate ER Tablet (IMDUR) 30 milliGRAM(s) Oral daily  metoprolol succinate ER 50 milliGRAM(s) Oral daily    # Hx of dvt/pe  - on apixaban 2.5 po bid -> on hold for prospective TDC placement    # HLD  atorvastatin 80 milliGRAM(s) Oral at bedtime     # Depression   - strated mirtazapine 7.5 milliGRAM(s) Oral daily for mood and appetite stimulation  - sertraline 25 milliGRAM(s) Oral daily    #Diet: Pureed renal restricted   #DVT ppx: Eliquis on hold for prospective TDC procedure  #GI ppx: Not indicated  #Activity: IAT  #HCP: Leona Marrufo - [phone] RACHEL MARRUFO is a 87 yo woman with h/o htn, DVT/PE in 2018 and in 2019, HFrEF s/p aicd and ckd V who p/w AMS and abdominal pain after being discharged from osh ([facility]) on 3/11 to complete macrobid abx course and now p/w ams and confusion with OMERO on CKD5    #Metabolic encephalopathy, improved  #OMERO on CKD stage V--2/2 dehydration and poor oral intake  #Resolved Cystitis  #Diarrhea---Enteritis  #HFrEF -- s/p Aicd  #h/o DVT/PE  - tele monitoring  - continue to monitor bmp closely  - started vit D  calcitriol 0.25 mcg po q24h   - hold nephrotoxic meds  - closely monitor vs-----can hold additional bp meds if becomes hypotensive (both entresto and lasix on hold---restart as cr and bp tolerates)  - lokelma 10g q24h for elevated potassium  - check stool study pending collection  - eps interrogated device   - continue eliquis 2.5 mg bid -> held on 3/29 for prospective TDC placement  - f/u ua/ucr/ulytes -> wnl   - primafit to monitor and document I/o  - repeat UA neg  - repeat Blood cultures neg  - palliative care GOC conversation continuing with HCP -> HCP and patient want dialysis as per last palliative note  - nephrology following--- TDC placed by IR 4/1 and  RRT done 4/1 - next RRT today 4/2    #suspected pneumonia  #perihilar opacities  - CXR showing perihilar opacities in setting of overnight fever and tachycardia  - renally dose Levofloxacin 500mg q48h x 5 days  - WBC downtrending     # HTN  -amLODIPine   Tablet 5 milliGRAM(s) Oral at bedtime  -isosorbide   mononitrate ER Tablet (IMDUR) 30 milliGRAM(s) Oral daily  -metoprolol succinate ER 50 milliGRAM(s) Oral daily    # Hx of dvt/pe  - on apixaban 2.5 po bid -> on hold for prospective TDC placement    # HLD  atorvastatin 80 milliGRAM(s) Oral at bedtime     # Depression   - strated mirtazapine 7.5 milliGRAM(s) Oral daily for mood and appetite stimulation  - sertraline 25 milliGRAM(s) Oral daily    #Diet: Pureed renal restricted   #DVT ppx: Eliquis on hold for prospective TDC procedure  #GI ppx: Not indicated  #Activity: IAT  #HCP: Leona Marrufo - [phone]    Pending: HD today, dc planning

## 2024-04-02 NOTE — PROGRESS NOTE ADULT - SUBJECTIVE AND OBJECTIVE BOX
24H events:    Patient is a 88y old Female who presents with a chief complaint of AMS (01 Apr 2024 21:05)    Primary diagnosis of OMERO (acute kidney injury)      Day 1:  Day 2:  Day 3:     Today is hospital day 11d. This morning patient was seen and examined at bedside, resting comfortably in bed.    No acute or major events overnight.    Code Status:    Family communication:  Contact date:  Name of person contacted:  Relationship to patient:  Communication details:  What matters most:    PAST MEDICAL & SURGICAL HISTORY  Chronic kidney disease    Hypertension    Gout    Diverticulitis  sigmoid    Rheumatoid arthritis    DVT, lower extremity  Bilateral    Pulmonary embolism    Chronic HFrEF (heart failure with reduced ejection fraction)    AICD (automatic cardioverter/defibrillator) present    Artificial pacemaker    History of appendectomy    History of tonsillectomy    History of hysterectomy    H/O hernia repair      SOCIAL HISTORY:  Social History:      ALLERGIES:  Keflex (Swelling)  cephalosporins (Angioedema)    MEDICATIONS:  STANDING MEDICATIONS  atorvastatin 80 milliGRAM(s) Oral at bedtime  bisacodyl Suppository 10 milliGRAM(s) Rectal at bedtime  calcitriol   Capsule 0.25 MICROGram(s) Oral daily  calcium acetate 1334 milliGRAM(s) Oral every 12 hours  cholecalciferol 1000 Unit(s) Oral daily  folic acid 1 milliGRAM(s) Oral daily  isosorbide   mononitrate ER Tablet (IMDUR) 30 milliGRAM(s) Oral daily  levoFLOXacin IVPB 500 milliGRAM(s) IV Intermittent every 48 hours  metoprolol succinate ER 50 milliGRAM(s) Oral daily  mirtazapine 7.5 milliGRAM(s) Oral daily  montelukast 10 milliGRAM(s) Oral daily  polyethylene glycol 3350 17 Gram(s) Oral daily  senna 2 Tablet(s) Oral at bedtime  sertraline 25 milliGRAM(s) Oral daily  sodium bicarbonate 650 milliGRAM(s) Oral every 12 hours    PRN MEDICATIONS  acetaminophen     Tablet .. 650 milliGRAM(s) Oral every 6 hours PRN  albuterol    90 MICROgram(s) HFA Inhaler 2 Puff(s) Inhalation every 6 hours PRN  ondansetron Injectable 4 milliGRAM(s) IV Push every 6 hours PRN  simethicone 80 milliGRAM(s) Chew every 6 hours PRN  traMADol 25 milliGRAM(s) Oral every 8 hours PRN    VITALS:   T(F): 98.7  HR: 88  BP: 150/78  RR: 18  SpO2: 100%    PHYSICAL EXAM:  GENERAL:   (  ) NAD, lying in bed comfortably     (  ) obtunded     (  ) lethargic     (  ) somnolent    HEAD:   (  ) Atraumatic     (  ) hematoma     (  ) laceration (specify location:       )     NECK:  (  ) Supple     (  ) neck stiffness     (  ) nuchal rigidity     (  )  no JVD     (  ) JVD present ( -- cm)    HEART:  Rate -->     (  ) normal rate     (  ) bradycardic     (  ) tachycardic  Rhythm -->     (  ) regular     (  ) regularly irregular     (  ) irregularly irregular  Murmurs -->     (  ) normal s1s2     (  ) systolic murmur     (  ) diastolic murmur     (  ) continuous murmur      (  ) S3 present     (  ) S4 present    LUNGS:   (  )Unlabored respirations     (  ) tachypnea  (  ) B/L air entry     (  ) decreased breath sounds in:  (location     )    (  ) no adventitious sound     (  ) crackles     (  ) wheezing      (  ) rhonchi      (specify location:       )  (  ) chest wall tenderness (specify location:       )    ABDOMEN:   (  ) Soft     (  ) tense   |   (  ) nondistended     (  ) distended   |   (  ) +BS     (  ) hypoactive bowel sounds     (  ) hyperactive bowel sounds  (  ) nontender     (  ) RUQ tenderness     (  ) RLQ tenderness     (  ) LLQ tenderness     (  ) epigastric tenderness     (  ) diffuse tenderness  (  ) Splenomegaly      (  ) Hepatomegaly      (  ) Jaundice     (  ) ecchymosis     EXTREMITIES:  (  ) Normal     (  ) Rash     (  ) ecchymosis     (  ) varicose veins      (  ) pitting edema     (  ) non-pitting edema   (  ) ulceration     (  ) gangrene:     (location:     )    NERVOUS SYSTEM:    (  ) A&Ox3     (  ) confused     (  ) lethargic  CN II-XII:     (  ) Intact     (  ) deficits found     (Specify:     )   Upper extremities:     (  ) no sensorimotor deficits     (  ) weakness     (  ) loss of proprioception/vibration     (  ) loss of touch/temperature (specify:    )  Lower extremities:     (  ) no sensorimotor deficits     (  ) weakness     (  ) loss of proprioception/vibration     (  ) loss of touch/temperature (specify:    )    SKIN:   (  ) No rashes or lesions     (  ) maculopapular rash     (  ) pustules     (  ) vesicles     (  ) ulcer     (  ) ecchymosis     (specify location:     )    AMPAC score:    (  ) Indwelling Morgan Catheter:   Date insterted:    Reason (  ) Critical illness     (  ) urinary retention    (  ) Accurate Ins/Outs Monitoring     (  ) CMO patient    (  ) Central Line:   Date inserted:  Location: (  ) Right IJ     (  ) Left IJ     (  ) Right Fem     (  ) Left Fem    (  ) SPC        (  ) pigtail       (  ) PEG tube       (  ) colostomy       (  ) jejunostomy  (  ) U-Dall    LABS:                        8.0    9.61  )-----------( 142      ( 02 Apr 2024 06:42 )             25.7     04-01    144  |  108  |  81<HH>  ----------------------------<  93  4.7   |  24  |  6.2<HH>    Ca    8.2<L>      01 Apr 2024 06:46  Mg     1.9     04-01    TPro  4.8<L>  /  Alb  2.8<L>  /  TBili  0.2  /  DBili  x   /  AST  31  /  ALT  15  /  AlkPhos  92  04-01    PT/INR - ( 01 Apr 2024 06:46 )   PT: 14.90 sec;   INR: 1.30 ratio         PTT - ( 01 Apr 2024 06:46 )  PTT:33.3 sec  Urinalysis Basic - ( 01 Apr 2024 06:46 )    Color: x / Appearance: x / SG: x / pH: x  Gluc: 93 mg/dL / Ketone: x  / Bili: x / Urobili: x   Blood: x / Protein: x / Nitrite: x   Leuk Esterase: x / RBC: x / WBC x   Sq Epi: x / Non Sq Epi: x / Bacteria: x                RADIOLOGY:           24H events:    Patient is a 88y old Female who presents with a chief complaint of AMS (01 Apr 2024 21:05)    Primary diagnosis of OMERO (acute kidney injury)      Day 1:  Day 2:  Day 3:     Today is hospital day 11d. This morning patient was seen and examined at bedside, resting comfortably in bed.    No acute or major events overnight.    Patient feeling well this morning, no acute complaints.     Code Status:    Family communication:  Contact date:  Name of person contacted:  Relationship to patient:  Communication details:  What matters most:    PAST MEDICAL & SURGICAL HISTORY  Chronic kidney disease    Hypertension    Gout    Diverticulitis  sigmoid    Rheumatoid arthritis    DVT, lower extremity  Bilateral    Pulmonary embolism    Chronic HFrEF (heart failure with reduced ejection fraction)    AICD (automatic cardioverter/defibrillator) present    Artificial pacemaker    History of appendectomy    History of tonsillectomy    History of hysterectomy    H/O hernia repair      SOCIAL HISTORY:  Social History:      ALLERGIES:  Keflex (Swelling)  cephalosporins (Angioedema)    MEDICATIONS:  STANDING MEDICATIONS  atorvastatin 80 milliGRAM(s) Oral at bedtime  bisacodyl Suppository 10 milliGRAM(s) Rectal at bedtime  calcitriol   Capsule 0.25 MICROGram(s) Oral daily  calcium acetate 1334 milliGRAM(s) Oral every 12 hours  cholecalciferol 1000 Unit(s) Oral daily  folic acid 1 milliGRAM(s) Oral daily  isosorbide   mononitrate ER Tablet (IMDUR) 30 milliGRAM(s) Oral daily  levoFLOXacin IVPB 500 milliGRAM(s) IV Intermittent every 48 hours  metoprolol succinate ER 50 milliGRAM(s) Oral daily  mirtazapine 7.5 milliGRAM(s) Oral daily  montelukast 10 milliGRAM(s) Oral daily  polyethylene glycol 3350 17 Gram(s) Oral daily  senna 2 Tablet(s) Oral at bedtime  sertraline 25 milliGRAM(s) Oral daily  sodium bicarbonate 650 milliGRAM(s) Oral every 12 hours    PRN MEDICATIONS  acetaminophen     Tablet .. 650 milliGRAM(s) Oral every 6 hours PRN  albuterol    90 MICROgram(s) HFA Inhaler 2 Puff(s) Inhalation every 6 hours PRN  ondansetron Injectable 4 milliGRAM(s) IV Push every 6 hours PRN  simethicone 80 milliGRAM(s) Chew every 6 hours PRN  traMADol 25 milliGRAM(s) Oral every 8 hours PRN    VITALS:   T(F): 98.7  HR: 88  BP: 150/78  RR: 18  SpO2: 100%    PHYSICAL EXAM:  GENERAL:   ( x ) NAD, lying in bed comfortably     (  ) obtunded     (  ) lethargic     (  ) somnolent    HEAD:   ( x ) Atraumatic     (  ) hematoma     (  ) laceration (specify location:       )     NECK:  (x  ) Supple     (  ) neck stiffness     (  ) nuchal rigidity     (  )  no JVD     (  ) JVD present ( -- cm)    HEART:  Rate -->     ( x ) normal rate     (  ) bradycardic     (  ) tachycardic  Rhythm -->     ( x ) regular     (  ) regularly irregular     (  ) irregularly irregular  Murmurs -->     (  ) normal s1s2     (  ) systolic murmur     (  ) diastolic murmur     (  ) continuous murmur      (  ) S3 present     (  ) S4 present    LUNGS:   (x  )Unlabored respirations     (  ) tachypnea  (x  ) B/L air entry     (  ) decreased breath sounds in:  (location     )    (  ) no adventitious sound     (  ) crackles     (  ) wheezing      (  ) rhonchi      (specify location:       )  (  ) chest wall tenderness (specify location:       )    ABDOMEN:   ( x ) Soft     (  ) tense   |   ( x ) nondistended     (  ) distended   |   (  ) +BS     (  ) hypoactive bowel sounds     (  ) hyperactive bowel sounds  ( x ) nontender     (  ) RUQ tenderness     (  ) RLQ tenderness     (  ) LLQ tenderness     (  ) epigastric tenderness     (  ) diffuse tenderness  (  ) Splenomegaly      (  ) Hepatomegaly      (  ) Jaundice     (  ) ecchymosis     EXTREMITIES:  ( x ) Normal     (  ) Rash     (  ) ecchymosis     (  ) varicose veins      (  ) pitting edema     (  ) non-pitting edema   (  ) ulceration     (  ) gangrene:     (location:     )    NERVOUS SYSTEM:    ( x ) A&Ox3     (  ) confused     (  ) lethargic  CN II-XII:     (  ) Intact     (  ) deficits found     (Specify:     )   Upper extremities:     (  ) no sensorimotor deficits     (  ) weakness     (  ) loss of proprioception/vibration     (  ) loss of touch/temperature (specify:    )  Lower extremities:     (  ) no sensorimotor deficits     (  ) weakness     (  ) loss of proprioception/vibration     (  ) loss of touch/temperature (specify:    )    SKIN:   ( x ) No rashes or lesions     (  ) maculopapular rash     (  ) pustules     (  ) vesicles     (  ) ulcer     (  ) ecchymosis     (specify location:     )    [facility] score:    (  ) Indwelling Morgan Catheter:   Date insterted:    Reason (  ) Critical illness     (  ) urinary retention    (  ) Accurate Ins/Outs Monitoring     (  ) CMO patient    (  ) Central Line:   Date inserted:  Location: (  ) Right IJ     (  ) Left IJ     (  ) Right Fem     (  ) Left Fem    (  ) SPC        (  ) pigtail       (  ) PEG tube       (  ) colostomy       (  ) jejunostomy  (  ) U-Dall    LABS:                        8.0    9.61  )-----------( 142      ( 02 Apr 2024 06:42 )             25.7     04-01    144  |  108  |  81<HH>  ----------------------------<  93  4.7   |  24  |  6.2<HH>    Ca    8.2<L>      01 Apr 2024 06:46  Mg     1.9     04-01    TPro  4.8<L>  /  Alb  2.8<L>  /  TBili  0.2  /  DBili  x   /  AST  31  /  ALT  15  /  AlkPhos  92  04-01    PT/INR - ( 01 Apr 2024 06:46 )   PT: 14.90 sec;   INR: 1.30 ratio         PTT - ( 01 Apr 2024 06:46 )  PTT:33.3 sec  Urinalysis Basic - ( 01 Apr 2024 06:46 )    Color: x / Appearance: x / SG: x / pH: x  Gluc: 93 mg/dL / Ketone: x  / Bili: x / Urobili: x   Blood: x / Protein: x / Nitrite: x   Leuk Esterase: x / RBC: x / WBC x   Sq Epi: x / Non Sq Epi: x / Bacteria: x                RADIOLOGY:

## 2024-04-02 NOTE — PROGRESS NOTE ADULT - ASSESSMENT
79 yo  female patient (Yarsani) with hx of CKD stage 5, HTN, DVT/PE 2018 (thought provoked by travel) completed 6m of AC then 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, NICM - HFrEF(LVEF 35-40% in 9/22) s/p AICD, RA, CKD stage 5. Currently on Macrobid for UTI (pt was discharge from [facility] 3/11 papers in her chart) presents to the ED for fluctuating mental status and abdominal pain with occasional CP.  Nephrology was consulted for OMERO over CKD stage 5.  # OMERO on CKD stage 5  Likely ATN precipitated by pre-renal etiology  - S/p TDC, s/p hd yesterday / second tt today   - d/c sodium bicarb po   - dose medications for iHD  # MBD  - d/ c phoslo  # anemia of CKD  - check Ferritin and Iron Sat  - patient is Yarsani, will not accept blood products  # HTN  Controlled  [facility]  Will follow

## 2024-04-03 LAB
ANION GAP SERPL CALC-SCNC: 10 MMOL/L — SIGNIFICANT CHANGE UP (ref 7–14)
BASOPHILS # BLD AUTO: 0.04 K/UL — SIGNIFICANT CHANGE UP (ref 0–0.2)
BASOPHILS NFR BLD AUTO: 0.4 % — SIGNIFICANT CHANGE UP (ref 0–1)
BUN SERPL-MCNC: 28 MG/DL — HIGH (ref 10–20)
CALCIUM SERPL-MCNC: 8 MG/DL — LOW (ref 8.4–10.5)
CHLORIDE SERPL-SCNC: 103 MMOL/L — SIGNIFICANT CHANGE UP (ref 98–110)
CO2 SERPL-SCNC: 28 MMOL/L — SIGNIFICANT CHANGE UP (ref 17–32)
CREAT SERPL-MCNC: 3.2 MG/DL — HIGH (ref 0.7–1.5)
EGFR: 13 ML/MIN/1.73M2 — LOW
EOSINOPHIL # BLD AUTO: 0.65 K/UL — SIGNIFICANT CHANGE UP (ref 0–0.7)
EOSINOPHIL NFR BLD AUTO: 7.3 % — SIGNIFICANT CHANGE UP (ref 0–8)
GLUCOSE SERPL-MCNC: 134 MG/DL — HIGH (ref 70–99)
HCT VFR BLD CALC: 25.1 % — LOW (ref 37–47)
HGB BLD-MCNC: 7.9 G/DL — LOW (ref 12–16)
IMM GRANULOCYTES NFR BLD AUTO: 0.4 % — HIGH (ref 0.1–0.3)
LYMPHOCYTES # BLD AUTO: 1.69 K/UL — SIGNIFICANT CHANGE UP (ref 1.2–3.4)
LYMPHOCYTES # BLD AUTO: 18.9 % — LOW (ref 20.5–51.1)
MAGNESIUM SERPL-MCNC: 1.9 MG/DL — SIGNIFICANT CHANGE UP (ref 1.8–2.4)
MCHC RBC-ENTMCNC: 28.6 PG — SIGNIFICANT CHANGE UP (ref 27–31)
MCHC RBC-ENTMCNC: 31.5 G/DL — LOW (ref 32–37)
MCV RBC AUTO: 90.9 FL — SIGNIFICANT CHANGE UP (ref 81–99)
MONOCYTES # BLD AUTO: 1.16 K/UL — HIGH (ref 0.1–0.6)
MONOCYTES NFR BLD AUTO: 13 % — HIGH (ref 1.7–9.3)
NEUTROPHILS # BLD AUTO: 5.36 K/UL — SIGNIFICANT CHANGE UP (ref 1.4–6.5)
NEUTROPHILS NFR BLD AUTO: 60 % — SIGNIFICANT CHANGE UP (ref 42.2–75.2)
NRBC # BLD: 0 /100 WBCS — SIGNIFICANT CHANGE UP (ref 0–0)
PLATELET # BLD AUTO: 147 K/UL — SIGNIFICANT CHANGE UP (ref 130–400)
PMV BLD: 9.8 FL — SIGNIFICANT CHANGE UP (ref 7.4–10.4)
POTASSIUM SERPL-MCNC: 3.7 MMOL/L — SIGNIFICANT CHANGE UP (ref 3.5–5)
POTASSIUM SERPL-SCNC: 3.7 MMOL/L — SIGNIFICANT CHANGE UP (ref 3.5–5)
RBC # BLD: 2.76 M/UL — LOW (ref 4.2–5.4)
RBC # FLD: 13.3 % — SIGNIFICANT CHANGE UP (ref 11.5–14.5)
SODIUM SERPL-SCNC: 141 MMOL/L — SIGNIFICANT CHANGE UP (ref 135–146)
TRANSFERRIN SERPL-MCNC: 113 MG/DL — LOW (ref 200–360)
WBC # BLD: 8.94 K/UL — SIGNIFICANT CHANGE UP (ref 4.8–10.8)
WBC # FLD AUTO: 8.94 K/UL — SIGNIFICANT CHANGE UP (ref 4.8–10.8)

## 2024-04-03 PROCEDURE — 99232 SBSQ HOSP IP/OBS MODERATE 35: CPT

## 2024-04-03 RX ORDER — CALCITRIOL 0.5 UG/1
0.5 CAPSULE ORAL
Refills: 0 | Status: DISCONTINUED | OUTPATIENT
Start: 2024-04-03 | End: 2024-04-06

## 2024-04-03 RX ORDER — SACUBITRIL AND VALSARTAN 24; 26 MG/1; MG/1
1 TABLET, FILM COATED ORAL
Refills: 0 | Status: DISCONTINUED | OUTPATIENT
Start: 2024-04-03 | End: 2024-04-06

## 2024-04-03 RX ORDER — APIXABAN 2.5 MG/1
2.5 TABLET, FILM COATED ORAL EVERY 12 HOURS
Refills: 0 | Status: DISCONTINUED | OUTPATIENT
Start: 2024-04-03 | End: 2024-04-06

## 2024-04-03 RX ADMIN — APIXABAN 2.5 MILLIGRAM(S): 2.5 TABLET, FILM COATED ORAL at 18:12

## 2024-04-03 RX ADMIN — MIRTAZAPINE 7.5 MILLIGRAM(S): 45 TABLET, ORALLY DISINTEGRATING ORAL at 12:50

## 2024-04-03 RX ADMIN — Medication 50 MILLIGRAM(S): at 06:33

## 2024-04-03 RX ADMIN — CALCITRIOL 0.25 MICROGRAM(S): 0.5 CAPSULE ORAL at 12:48

## 2024-04-03 RX ADMIN — CALCITRIOL 0.5 MICROGRAM(S): 0.5 CAPSULE ORAL at 18:24

## 2024-04-03 RX ADMIN — CHLORHEXIDINE GLUCONATE 1 APPLICATION(S): 213 SOLUTION TOPICAL at 12:53

## 2024-04-03 RX ADMIN — ISOSORBIDE MONONITRATE 30 MILLIGRAM(S): 60 TABLET, EXTENDED RELEASE ORAL at 12:47

## 2024-04-03 RX ADMIN — SERTRALINE 25 MILLIGRAM(S): 25 TABLET, FILM COATED ORAL at 12:50

## 2024-04-03 RX ADMIN — MONTELUKAST 10 MILLIGRAM(S): 4 TABLET, CHEWABLE ORAL at 12:50

## 2024-04-03 RX ADMIN — Medication 650 MILLIGRAM(S): at 06:33

## 2024-04-03 RX ADMIN — Medication 650 MILLIGRAM(S): at 06:35

## 2024-04-03 RX ADMIN — Medication 1000 UNIT(S): at 12:49

## 2024-04-03 RX ADMIN — Medication 1 MILLIGRAM(S): at 12:54

## 2024-04-03 RX ADMIN — POLYETHYLENE GLYCOL 3350 17 GRAM(S): 17 POWDER, FOR SOLUTION ORAL at 12:50

## 2024-04-03 RX ADMIN — SENNA PLUS 2 TABLET(S): 8.6 TABLET ORAL at 20:18

## 2024-04-03 RX ADMIN — Medication 1334 MILLIGRAM(S): at 06:33

## 2024-04-03 RX ADMIN — ATORVASTATIN CALCIUM 80 MILLIGRAM(S): 80 TABLET, FILM COATED ORAL at 20:18

## 2024-04-03 RX ADMIN — Medication 10 MILLIGRAM(S): at 20:18

## 2024-04-03 RX ADMIN — SACUBITRIL AND VALSARTAN 1 TABLET(S): 24; 26 TABLET, FILM COATED ORAL at 20:19

## 2024-04-03 NOTE — PROGRESS NOTE ADULT - ASSESSMENT
81 yo  female patient (Hindu) with hx of CKD stage 5, HTN, DVT/PE 2018 (thought provoked by travel) completed 6m of AC then 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, NICM - HFrEF(LVEF 35-40% in 9/22) s/p AICD, RA, CKD stage 5. Currently on Macrobid for UTI (pt was discharge from [facility] 3/11 papers in her chart) presents to the ED for fluctuating mental status and abdominal pain with occasional CP.  Nephrology was consulted for OMERO over CKD stage 5.  # OMERO on CKD stage 5  Likely ATN precipitated by pre-renal etiology  - S/p TDC, s/p hd yesterday/ hd in am   - off  sodium bicarb po   - dose medications for iHD  # MBD  - d/ c phoslo  # anemia of CKD  - check Ferritin and Iron Sat  - patient is Hindu, will not accept blood products  # HTN  Controlled  Needs outpatient HD set up at SNF or at [address] [place] [address] if going home  Will follow

## 2024-04-03 NOTE — PROGRESS NOTE ADULT - ASSESSMENT
RACHEL SUMMERS is a 89 yo woman with h/o htn, DVT/PE in 2018 and in 2019, HFrEF s/p aicd and ckd V who p/w AMS and abdominal pain after being discharged from osh ([facility]) on 3/11 to complete macrobid abx course and now p/w ams and confusion with OMERO on CKD5    #Metabolic encephalopathy, improved  #OMERO on CKD stage V--2/2 dehydration and poor oral intake  #Resolved Cystitis  #Diarrhea---Enteritis  #HFrEF -- s/p Aicd  #h/o DVT/PE  - tele monitoring  - continue to monitor bmp closely  - started vit D  calcitriol 0.25 mcg po q24h   - hold nephrotoxic meds  - closely monitor vs-----can hold additional bp meds if becomes hypotensive (both entresto and lasix on hold---restart as cr and bp tolerates)  - lokelma 10g q24h for elevated potassium  - check stool study pending collection  - eps interrogated device   - f/u ua/ucr/ulytes -> wnl   - primafit to monitor and document I/o  - repeat UA neg  - repeat Blood cultures neg  - palliative care GOC conversation continuing with HCP -> HCP and patient want dialysis as per last palliative note  - nephrology following--- TDC placed by IR 4/1 and  RRT done 4/1 and 4/2  - pt needs outpatient HD arranged prior to dc     #suspected pneumonia  #perihilar opacities  - CXR showing perihilar opacities in setting of overnight fever and tachycardia  - s/p Levofloxacin 500mg q48h x 5 days     # HTN  -amLODIPine   Tablet 5 milliGRAM(s) Oral at bedtime  -isosorbide   mononitrate ER Tablet (IMDUR) 30 milliGRAM(s) Oral daily  -metoprolol succinate ER 50 milliGRAM(s) Oral daily    # Hx of dvt/pe  - on apixaban 2.5 po bid -> on hold for prospective TDC placement    # HLD  atorvastatin 80 milliGRAM(s) Oral at bedtime     # Depression   - strated mirtazapine 7.5 milliGRAM(s) Oral daily for mood and appetite stimulation  - sertraline 25 milliGRAM(s) Oral daily    #Diet: Pureed renal restricted   #DVT ppx: Eliquis on hold for prospective TDC procedure  #GI ppx: Not indicated  #Activity: IAT  #HCP: Leona Yaron - [phone]    Pending: dc planning, needs outpatient HD set up

## 2024-04-03 NOTE — PROGRESS NOTE ADULT - ATTENDING COMMENTS
Pt examined this am. Feeling nauseous and dizzy. Euvolemic but felt pain when I checked for edema in her legs.     RACHEL MARRUFO is a 89 yo woman with h/o htn, DVT/PE in 2018 and in 2019, HFrEF s/p aicd and ckd V who p/w AMS and abdominal pain after being discharged from osh ([facility]) on 3/11 to complete macrobid abx course and now p/w ams and confusion with OMERO on CKD5      #Metabolic encephalopathy, improved  #ESRD now on new HD  #Resolved Cystitis  #Diarrhea---Enteritis  #HFrEF -- s/p Aicd  #h/o DVT/PE  TDC placed by IR 4/1 and  RRT done 4/1 and 4/2  - secondary hyperPTH due to ESRD so changed calcitriol to 0.5mg bid until PTH and CA are wnl  - SBP >100, resume entresto  - pt urinating; monitor BP and then can also possibly resume lasix  - stop phos binders  - not on lokelma  - ICD interrogation wnl  - infectious workup negative  - op HD being set up    #suspected pneumonia  #perihilar opacities  - CXR showing perihilar opacities   - completed Levofloxacin 500mg q48h x 5 days     # HTN  - pt not on amlodipine  - can continue imdur, toprol, and now entresto      # Hx of dvt/pe  - on apixaban 2.5 po bid resumed    # HLD  atorvastatin 80 milliGRAM(s) Oral at bedtime     # Depression   - stopped mirtazapine  - sertraline 25 milliGRAM(s) Oral daily    #anemia  - in setting of ESRD  - pt is a [employer]  - pediatric sized blood draws; minimize frequency    #Diet: bite sized renal restricted   #DVT ppx: Eliquis   #GI ppx: Not indicated  #Activity: IAT  #HCP: Leona Marrufo - [phone]    Pending: saud planning, needs outpatient HD set up

## 2024-04-03 NOTE — PROGRESS NOTE ADULT - SUBJECTIVE AND OBJECTIVE BOX
24H events:    Patient is a 88y old Female who presents with a chief complaint of AMS (03 Apr 2024 08:06)    Primary diagnosis of OMERO (acute kidney injury)      Day 1:  Day 2:  Day 3:     Today is hospital day 12d. This morning patient was seen and examined at bedside, resting comfortably in bed.    No acute or major events overnight.    Code Status:    Family communication:  Contact date:  Name of person contacted:  Relationship to patient:  Communication details:  What matters most:    PAST MEDICAL & SURGICAL HISTORY  Chronic kidney disease    Hypertension    Gout    Diverticulitis  sigmoid    Rheumatoid arthritis    DVT, lower extremity  Bilateral    Pulmonary embolism    Chronic HFrEF (heart failure with reduced ejection fraction)    AICD (automatic cardioverter/defibrillator) present    Artificial pacemaker    History of appendectomy    History of tonsillectomy    History of hysterectomy    H/O hernia repair      SOCIAL HISTORY:  Social History:      ALLERGIES:  Keflex (Swelling)  cephalosporins (Angioedema)    MEDICATIONS:  STANDING MEDICATIONS  atorvastatin 80 milliGRAM(s) Oral at bedtime  bisacodyl Suppository 10 milliGRAM(s) Rectal at bedtime  calcitriol   Capsule 0.25 MICROGram(s) Oral daily  calcium acetate 1334 milliGRAM(s) Oral every 12 hours  chlorhexidine 2% Cloths 1 Application(s) Topical daily  cholecalciferol 1000 Unit(s) Oral daily  folic acid 1 milliGRAM(s) Oral daily  isosorbide   mononitrate ER Tablet (IMDUR) 30 milliGRAM(s) Oral daily  metoprolol succinate ER 50 milliGRAM(s) Oral daily  mirtazapine 7.5 milliGRAM(s) Oral daily  montelukast 10 milliGRAM(s) Oral daily  polyethylene glycol 3350 17 Gram(s) Oral daily  senna 2 Tablet(s) Oral at bedtime  sertraline 25 milliGRAM(s) Oral daily    PRN MEDICATIONS  acetaminophen     Tablet .. 650 milliGRAM(s) Oral every 6 hours PRN  albuterol    90 MICROgram(s) HFA Inhaler 2 Puff(s) Inhalation every 6 hours PRN  ondansetron Injectable 4 milliGRAM(s) IV Push every 6 hours PRN  simethicone 80 milliGRAM(s) Chew every 6 hours PRN  traMADol 25 milliGRAM(s) Oral every 8 hours PRN    VITALS:   T(F): 99  HR: 76  BP: 142/65  RR: 19  SpO2: 100%    PHYSICAL EXAM:  GENERAL:   (  x) NAD, lying in bed comfortably     (  ) obtunded     (  ) lethargic     (  ) somnolent    HEAD:   (x  ) Atraumatic     (  ) hematoma     (  ) laceration (specify location:       )     NECK:  (x  ) Supple     (  ) neck stiffness     (  ) nuchal rigidity     (  )  no JVD     (  ) JVD present ( -- cm)    HEART:  Rate -->     (  x) normal rate     (  ) bradycardic     (  ) tachycardic  Rhythm -->     ( x ) regular     (  ) regularly irregular     (  ) irregularly irregular  Murmurs -->     (  ) normal s1s2     (  ) systolic murmur     (  ) diastolic murmur     (  ) continuous murmur      (  ) S3 present     (  ) S4 present    LUNGS:   (  x)Unlabored respirations     (  ) tachypnea  ( x ) B/L air entry     (  ) decreased breath sounds in:  (location     )    (  ) no adventitious sound     (  ) crackles     (  ) wheezing      (  ) rhonchi      (specify location:       )  (  ) chest wall tenderness (specify location:       )    ABDOMEN:   ( x ) Soft     (  ) tense   |   (x  ) nondistended     (  ) distended   |   (  ) +BS     (  ) hypoactive bowel sounds     (  ) hyperactive bowel sounds  (  x) nontender     (  ) RUQ tenderness     (  ) RLQ tenderness     (  ) LLQ tenderness     (  ) epigastric tenderness     (  ) diffuse tenderness  (  ) Splenomegaly      (  ) Hepatomegaly      (  ) Jaundice     (  ) ecchymosis     EXTREMITIES:  ( x ) Normal     (  ) Rash     (  ) ecchymosis     (  ) varicose veins      (  ) pitting edema     (  ) non-pitting edema   (  ) ulceration     (  ) gangrene:     (location:     )    NERVOUS SYSTEM:    ( x ) A&Ox3     (  ) confused     (  ) lethargic  CN II-XII:     (  ) Intact     (  ) deficits found     (Specify:     )   Upper extremities:     (  ) no sensorimotor deficits     (  ) weakness     (  ) loss of proprioception/vibration     (  ) loss of touch/temperature (specify:    )  Lower extremities:     (  ) no sensorimotor deficits     (  ) weakness     (  ) loss of proprioception/vibration     (  ) loss of touch/temperature (specify:    )    SKIN:   (x  ) No rashes or lesions     (  ) maculopapular rash     (  ) pustules     (  ) vesicles     (  ) ulcer     (  ) ecchymosis     (specify location:     )    AMPAC score:    (  ) Indwelling Morgan Catheter:   Date insterted:    Reason (  ) Critical illness     (  ) urinary retention    (  ) Accurate Ins/Outs Monitoring     (  ) CMO patient    (  ) Central Line:   Date inserted:  Location: (  ) Right IJ     (  ) Left IJ     (  ) Right Fem     (  ) Left Fem    (  ) SPC        (  ) pigtail       (  ) PEG tube       (  ) colostomy       (  ) jejunostomy  (  ) U-Dall    LABS:                        8.0    9.61  )-----------( 142      ( 02 Apr 2024 06:42 )             25.7     04-02    142  |  108  |  47<H>  ----------------------------<  61<L>  4.6   |  27  |  4.1<HH>    Ca    7.6<L>      02 Apr 2024 06:42  Phos  2.8     04-02  Mg     1.8     04-02    TPro  4.7<L>  /  Alb  2.6<L>  /  TBili  0.3  /  DBili  x   /  AST  33  /  ALT  14  /  AlkPhos  82  04-02      Urinalysis Basic - ( 02 Apr 2024 06:42 )    Color: x / Appearance: x / SG: x / pH: x  Gluc: 61 mg/dL / Ketone: x  / Bili: x / Urobili: x   Blood: x / Protein: x / Nitrite: x   Leuk Esterase: x / RBC: x / WBC x   Sq Epi: x / Non Sq Epi: x / Bacteria: x                RADIOLOGY:

## 2024-04-03 NOTE — PROGRESS NOTE ADULT - SUBJECTIVE AND OBJECTIVE BOX
seen and examined  24 h events noted   no distress   lying comfortable         PAST HISTORY  --------------------------------------------------------------------------------  No significant changes to PMH, PSH, FHx, SHx, unless otherwise noted    ALLERGIES & MEDICATIONS  --------------------------------------------------------------------------------  Allergies    Keflex (Swelling)  cephalosporins (Angioedema)    Intolerances      Standing Inpatient Medications  atorvastatin 80 milliGRAM(s) Oral at bedtime  bisacodyl Suppository 10 milliGRAM(s) Rectal at bedtime  calcitriol   Capsule 0.25 MICROGram(s) Oral daily  calcium acetate 1334 milliGRAM(s) Oral every 12 hours  chlorhexidine 2% Cloths 1 Application(s) Topical daily  cholecalciferol 1000 Unit(s) Oral daily  folic acid 1 milliGRAM(s) Oral daily  isosorbide   mononitrate ER Tablet (IMDUR) 30 milliGRAM(s) Oral daily  metoprolol succinate ER 50 milliGRAM(s) Oral daily  mirtazapine 7.5 milliGRAM(s) Oral daily  montelukast 10 milliGRAM(s) Oral daily  polyethylene glycol 3350 17 Gram(s) Oral daily  senna 2 Tablet(s) Oral at bedtime  sertraline 25 milliGRAM(s) Oral daily    PRN Inpatient Medications  acetaminophen     Tablet .. 650 milliGRAM(s) Oral every 6 hours PRN  albuterol    90 MICROgram(s) HFA Inhaler 2 Puff(s) Inhalation every 6 hours PRN  ondansetron Injectable 4 milliGRAM(s) IV Push every 6 hours PRN  simethicone 80 milliGRAM(s) Chew every 6 hours PRN  traMADol 25 milliGRAM(s) Oral every 8 hours PRN        VITALS/PHYSICAL EXAM  --------------------------------------------------------------------------------  T(C): 37.2 (04-03-24 @ 06:29), Max: 37.4 (04-02-24 @ 20:40)  HR: 76 (04-03-24 @ 06:29) (61 - 84)  BP: 142/65 (04-03-24 @ 06:29) (138/66 - 163/74)  RR: 19 (04-03-24 @ 06:29) (18 - 19)  SpO2: 100% (04-02-24 @ 14:00) (100% - 100%)  Wt(kg): --  Height (cm): 160 (04-01-24 @ 10:48)  Weight (kg): 57.1 (04-01-24 @ 10:48)  BMI (kg/m2): 22.3 (04-01-24 @ 10:48)  BSA (m2): 1.59 (04-01-24 @ 10:48)      04-02-24 @ 07:01  -  04-03-24 @ 07:00  --------------------------------------------------------  IN: 0 mL / OUT: 2000 mL / NET: -2000 mL      Physical Exam:  	Gen: NAD,  	Pulm: CTA B/L  	CV: S1S2; no rub  	Abd:  soft,/nondistended  	LE:  no edema  	Vascular access:tdc     LABS/STUDIES  --------------------------------------------------------------------------------              8.0    9.61  >-----------<  142      [04-02-24 @ 06:42]              25.7     142  |  108  |  47  ----------------------------<  61      [04-02-24 @ 06:42]  4.6   |  27  |  4.1        Ca     7.6     [04-02-24 @ 06:42]      Mg     1.8     [04-02-24 @ 06:42]      Phos  2.8     [04-02-24 @ 06:42]    TPro  4.7  /  Alb  2.6  /  TBili  0.3  /  DBili  x   /  AST  33  /  ALT  14  /  AlkPhos  82  [04-02-24 @ 06:42]      Creatinine Trend:  SCr 4.1 [04-02 @ 06:42]  SCr 6.2 [04-01 @ 06:46]  SCr 6.1 [03-31 @ 08:59]  SCr 6.4 [03-30 @ 09:03]  SCr 6.0 [03-29 @ 06:33]    Urinalysis - [04-02-24 @ 06:42]      Color  / Appearance  / SG  / pH       Gluc 61 / Ketone   / Bili  / Urobili        Blood  / Protein  / Leuk Est  / Nitrite       RBC  / WBC  / Hyaline  / Gran  / Sq Epi  / Non Sq Epi  / Bacteria       PTH -- (Ca 8.2)      [03-24-24 @ 20:00]   138  Vitamin D (25OH) 9      [03-24-24 @ 20:00]  Lipid: chol 144, , HDL 39, LDL --      [03-22-24 @ 06:14]    HBsAb Nonreact      [03-27-24 @ 15:55]  HBsAg Nonreact      [04-01-24 @ 15:52]  HCV 0.20, Nonreact      [04-01-24 @ 15:52]

## 2024-04-04 ENCOUNTER — TRANSCRIPTION ENCOUNTER (OUTPATIENT)
Age: 89
End: 2024-04-04

## 2024-04-04 LAB
ANION GAP SERPL CALC-SCNC: 10 MMOL/L — SIGNIFICANT CHANGE UP (ref 7–14)
BASOPHILS # BLD AUTO: 0.04 K/UL — SIGNIFICANT CHANGE UP (ref 0–0.2)
BASOPHILS NFR BLD AUTO: 0.6 % — SIGNIFICANT CHANGE UP (ref 0–1)
BUN SERPL-MCNC: 34 MG/DL — HIGH (ref 10–20)
CALCIUM SERPL-MCNC: 7.9 MG/DL — LOW (ref 8.4–10.5)
CHLORIDE SERPL-SCNC: 102 MMOL/L — SIGNIFICANT CHANGE UP (ref 98–110)
CO2 SERPL-SCNC: 29 MMOL/L — SIGNIFICANT CHANGE UP (ref 17–32)
CREAT SERPL-MCNC: 3.9 MG/DL — HIGH (ref 0.7–1.5)
EGFR: 11 ML/MIN/1.73M2 — LOW
EOSINOPHIL # BLD AUTO: 0.67 K/UL — SIGNIFICANT CHANGE UP (ref 0–0.7)
EOSINOPHIL NFR BLD AUTO: 9.6 % — HIGH (ref 0–8)
FERRITIN SERPL-MCNC: 785 NG/ML — HIGH (ref 13–330)
GLUCOSE SERPL-MCNC: 102 MG/DL — HIGH (ref 70–99)
HBV CORE AB SER-ACNC: SIGNIFICANT CHANGE UP
HCT VFR BLD CALC: 26.5 % — LOW (ref 37–47)
HGB BLD-MCNC: 8.3 G/DL — LOW (ref 12–16)
IMM GRANULOCYTES NFR BLD AUTO: 0.4 % — HIGH (ref 0.1–0.3)
IRON SATN MFR SERPL: 22 % — SIGNIFICANT CHANGE UP (ref 15–50)
IRON SATN MFR SERPL: 33 UG/DL — LOW (ref 35–150)
LYMPHOCYTES # BLD AUTO: 1.69 K/UL — SIGNIFICANT CHANGE UP (ref 1.2–3.4)
LYMPHOCYTES # BLD AUTO: 24.1 % — SIGNIFICANT CHANGE UP (ref 20.5–51.1)
MAGNESIUM SERPL-MCNC: 1.9 MG/DL — SIGNIFICANT CHANGE UP (ref 1.8–2.4)
MCHC RBC-ENTMCNC: 28.1 PG — SIGNIFICANT CHANGE UP (ref 27–31)
MCHC RBC-ENTMCNC: 31.3 G/DL — LOW (ref 32–37)
MCV RBC AUTO: 89.8 FL — SIGNIFICANT CHANGE UP (ref 81–99)
MONOCYTES # BLD AUTO: 0.85 K/UL — HIGH (ref 0.1–0.6)
MONOCYTES NFR BLD AUTO: 12.1 % — HIGH (ref 1.7–9.3)
NEUTROPHILS # BLD AUTO: 3.73 K/UL — SIGNIFICANT CHANGE UP (ref 1.4–6.5)
NEUTROPHILS NFR BLD AUTO: 53.2 % — SIGNIFICANT CHANGE UP (ref 42.2–75.2)
NRBC # BLD: 0 /100 WBCS — SIGNIFICANT CHANGE UP (ref 0–0)
PLATELET # BLD AUTO: 143 K/UL — SIGNIFICANT CHANGE UP (ref 130–400)
PMV BLD: 10 FL — SIGNIFICANT CHANGE UP (ref 7.4–10.4)
POTASSIUM SERPL-MCNC: 3.3 MMOL/L — LOW (ref 3.5–5)
POTASSIUM SERPL-SCNC: 3.3 MMOL/L — LOW (ref 3.5–5)
RBC # BLD: 2.95 M/UL — LOW (ref 4.2–5.4)
RBC # FLD: 13.2 % — SIGNIFICANT CHANGE UP (ref 11.5–14.5)
SODIUM SERPL-SCNC: 141 MMOL/L — SIGNIFICANT CHANGE UP (ref 135–146)
TIBC SERPL-MCNC: 147 UG/DL — LOW (ref 220–430)
UIBC SERPL-MCNC: 114 UG/DL — SIGNIFICANT CHANGE UP (ref 110–370)
WBC # BLD: 7.01 K/UL — SIGNIFICANT CHANGE UP (ref 4.8–10.8)
WBC # FLD AUTO: 7.01 K/UL — SIGNIFICANT CHANGE UP (ref 4.8–10.8)

## 2024-04-04 PROCEDURE — 99232 SBSQ HOSP IP/OBS MODERATE 35: CPT

## 2024-04-04 RX ORDER — FUROSEMIDE 40 MG
40 TABLET ORAL DAILY
Refills: 0 | Status: DISCONTINUED | OUTPATIENT
Start: 2024-04-04 | End: 2024-04-06

## 2024-04-04 RX ADMIN — Medication 40 MILLIGRAM(S): at 11:51

## 2024-04-04 RX ADMIN — MONTELUKAST 10 MILLIGRAM(S): 4 TABLET, CHEWABLE ORAL at 11:51

## 2024-04-04 RX ADMIN — Medication 50 MILLIGRAM(S): at 07:04

## 2024-04-04 RX ADMIN — ISOSORBIDE MONONITRATE 30 MILLIGRAM(S): 60 TABLET, EXTENDED RELEASE ORAL at 12:17

## 2024-04-04 RX ADMIN — SACUBITRIL AND VALSARTAN 1 TABLET(S): 24; 26 TABLET, FILM COATED ORAL at 08:53

## 2024-04-04 RX ADMIN — APIXABAN 2.5 MILLIGRAM(S): 2.5 TABLET, FILM COATED ORAL at 18:36

## 2024-04-04 RX ADMIN — CHLORHEXIDINE GLUCONATE 1 APPLICATION(S): 213 SOLUTION TOPICAL at 11:56

## 2024-04-04 RX ADMIN — CALCITRIOL 0.5 MICROGRAM(S): 0.5 CAPSULE ORAL at 18:36

## 2024-04-04 RX ADMIN — Medication 650 MILLIGRAM(S): at 10:26

## 2024-04-04 RX ADMIN — Medication 10 MILLIGRAM(S): at 22:06

## 2024-04-04 RX ADMIN — POLYETHYLENE GLYCOL 3350 17 GRAM(S): 17 POWDER, FOR SOLUTION ORAL at 11:52

## 2024-04-04 RX ADMIN — ATORVASTATIN CALCIUM 80 MILLIGRAM(S): 80 TABLET, FILM COATED ORAL at 22:06

## 2024-04-04 RX ADMIN — CALCITRIOL 0.5 MICROGRAM(S): 0.5 CAPSULE ORAL at 07:04

## 2024-04-04 RX ADMIN — Medication 1 MILLIGRAM(S): at 11:51

## 2024-04-04 RX ADMIN — SERTRALINE 25 MILLIGRAM(S): 25 TABLET, FILM COATED ORAL at 11:51

## 2024-04-04 RX ADMIN — SACUBITRIL AND VALSARTAN 1 TABLET(S): 24; 26 TABLET, FILM COATED ORAL at 18:37

## 2024-04-04 RX ADMIN — Medication 650 MILLIGRAM(S): at 11:26

## 2024-04-04 RX ADMIN — APIXABAN 2.5 MILLIGRAM(S): 2.5 TABLET, FILM COATED ORAL at 07:04

## 2024-04-04 NOTE — PROGRESS NOTE ADULT - ASSESSMENT
RACHEL SUMMERS is a 87 yo woman with h/o htn, DVT/PE in 2018 and in 2019, HFrEF s/p aicd and ckd V who p/w AMS and abdominal pain after being discharged from osh ([facility]) on 3/11 to complete macrobid abx course and now p/w ams and confusion with OMERO on CKD5    #Metabolic encephalopathy, improved  #OMERO on CKD stage V--2/2 dehydration and poor oral intake  #Resolved Cystitis  #Diarrhea---Enteritis  #HFrEF -- s/p Aicd  #h/o DVT/PE  - tele monitoring  - continue to monitor bmp closely  - started vit D  calcitriol 0.25 mcg po q24h   - hold nephrotoxic meds  - closely monitor vs-----can hold additional bp meds if becomes hypotensive (both entresto and lasix on hold---restart as cr and bp tolerates)  - lokelma 10g q24h for elevated potassium  - check stool study pending collection  - eps interrogated device   - f/u ua/ucr/ulytes -> wnl   - primafit to monitor and document I/o  - repeat UA neg  - repeat Blood cultures neg  - palliative care GOC conversation continuing with HCP -> HCP and patient want dialysis as per last palliative note  - nephrology following--- TDC placed by IR 4/1 and  RRT done 4/1 and 4/2  - pt needs outpatient HD arranged prior to dc     #suspected pneumonia  #perihilar opacities  - CXR showing perihilar opacities in setting of overnight fever and tachycardia  - s/p Levofloxacin 500mg q48h x 5 days     # HTN  -amLODIPine   Tablet 5 milliGRAM(s) Oral at bedtime  -isosorbide   mononitrate ER Tablet (IMDUR) 30 milliGRAM(s) Oral daily  -metoprolol succinate ER 50 milliGRAM(s) Oral daily    # Hx of dvt/pe  - on apixaban 2.5 po bid -> on hold for prospective TDC placement    # HLD  atorvastatin 80 milliGRAM(s) Oral at bedtime     # Depression   - strated mirtazapine 7.5 milliGRAM(s) Oral daily for mood and appetite stimulation  - sertraline 25 milliGRAM(s) Oral daily    #Diet: Pureed renal restricted   #DVT ppx: Eliquis on hold for prospective TDC procedure  #GI ppx: Not indicated  #Activity: IAT  #HCP: Leona Yaron - [phone]    Pending: dc planning, needs outpatient HD set up RACHEL MARRUFO is a 89 yo woman with h/o htn, DVT/PE in 2018 and in 2019, HFrEF s/p aicd and ckd V who p/w AMS and abdominal pain after being discharged from osh ([facility]) on 3/11 to complete macrobid abx course and now p/w ams and confusion with OMERO on CKD5      #Metabolic encephalopathy, improved  #ESRD now on new HD  #Resolved Cystitis  #Diarrhea---Enteritis  #HFrEF -- s/p Aicd  #h/o DVT/PE  TDC placed by IR 4/1 and  RRT done 4/1 and 4/2  - secondary hyperPTH due to ESRD so changed calcitriol to 0.5mg bid until PTH and CA are wnl  - SBP >100, resumed entresto  - pt urinating; monitor BP and then can also possibly resume lasix  - stop phos binders  - not on lokelma  - ICD interrogation wnl  - infectious workup negative  - continue lasix 40mg po qd  - op HD set up to begin 4/9 so can be discharged after HD session on 4/6    #suspected pneumonia  #perihilar opacities  - CXR showing perihilar opacities   - completed Levofloxacin 500mg q48h x 5 days     # HTN  - pt not on amlodipine  - can continue imdur, toprol, and now entresto  - continue lasix 40mg po qd      # Hx of dvt/pe  - on apixaban 2.5 po bid resumed    # HLD  atorvastatin 80 milliGRAM(s) Oral at bedtime     # Depression   - stopped mirtazapine  - sertraline 25 milliGRAM(s) Oral daily    #anemia  - in setting of ESRD  - pt is a Anabaptist  - pediatric sized blood draws; minimize frequency    #Diet: bite sized renal restricted   #DVT ppx: Eliquis   #GI ppx: Not indicated  #Activity: IAT  #HCP: Leona Marrufo - [phone]    Pending: discharge tomorrow after HD

## 2024-04-04 NOTE — DISCHARGE NOTE PROVIDER - CARE PROVIDERS DIRECT ADDRESSES
[email or web address]

## 2024-04-04 NOTE — DISCHARGE NOTE PROVIDER - CARE PROVIDER_API CALL
Fabio Ward  [address] [address]  Phone: [phone]  Fax: [phone]  Follow Up Time:

## 2024-04-04 NOTE — PROGRESS NOTE ADULT - ATTENDING COMMENTS
Pt examined this am with daughter bedside. She said that she is having diarrhea and then sitting in it for too long. RN did not note any diarrhea.  Will monitor today.     RACHEL MARRUFO is a 87 yo woman with h/o htn, DVT/PE in 2018 and in 2019, HFrEF s/p aicd and ckd V who p/w AMS and abdominal pain after being discharged from osh ([facility]) on 3/11 to complete macrobid abx course and now p/w ams and confusion with OMERO on CKD5      #Metabolic encephalopathy, improved  #ESRD now on new HD  #Resolved Cystitis  #Diarrhea---Enteritis  #HFrEF -- s/p Aicd  #h/o DVT/PE  TDC placed by IR 4/1 and  RRT done 4/1 and 4/2  - secondary hyperPTH due to ESRD so changed calcitriol to 0.5mg bid until PTH and CA are wnl  - SBP >100, resume entresto  - pt urinating; monitor BP and then can also possibly resume lasix  - stop phos binders  - not on lokelma  - ICD interrogation wnl  - infectious workup negative  - op HD being set up    #suspected pneumonia  #perihilar opacities  - CXR showing perihilar opacities   - completed Levofloxacin 500mg q48h x 5 days     # HTN  - pt not on amlodipine  - can continue imdur, toprol, and now entresto      # Hx of dvt/pe  - on apixaban 2.5 po bid resumed    # HLD  atorvastatin 80 milliGRAM(s) Oral at bedtime     # Depression   - stopped mirtazapine  - sertraline 25 milliGRAM(s) Oral daily    #anemia  - in setting of ESRD  - pt is a Sikh  - pediatric sized blood draws; minimize frequency    #Diet: bite sized renal restricted   #DVT ppx: Eliquis   #GI ppx: Not indicated  #Activity: IAT  #HCP: Leona Marrufo - [phone]    Pending: dc planning, needs outpatient HD set up Discussed with residents

## 2024-04-04 NOTE — DISCHARGE NOTE PROVIDER - NSDCFUSCHEDAPPT_GEN_ALL_CORE_FT
Sterling Mckinney  [facility] Physician [facility]  CARDIOLOGY [address] [place] [address]  Scheduled Appointment: 05/21/2024

## 2024-04-04 NOTE — PROGRESS NOTE ADULT - SUBJECTIVE AND OBJECTIVE BOX
seen and examined  24 h events noted   no distress         PAST HISTORY  --------------------------------------------------------------------------------  No significant changes to PMH, PSH, FHx, SHx, unless otherwise noted    ALLERGIES & MEDICATIONS  --------------------------------------------------------------------------------  Allergies    Keflex (Swelling)  cephalosporins (Angioedema)    Intolerances      Standing Inpatient Medications  apixaban 2.5 milliGRAM(s) Oral every 12 hours  atorvastatin 80 milliGRAM(s) Oral at bedtime  bisacodyl Suppository 10 milliGRAM(s) Rectal at bedtime  calcitriol   Capsule 0.5 MICROGram(s) Oral two times a day  chlorhexidine 2% Cloths 1 Application(s) Topical daily  folic acid 1 milliGRAM(s) Oral daily  isosorbide   mononitrate ER Tablet (IMDUR) 30 milliGRAM(s) Oral daily  metoprolol succinate ER 50 milliGRAM(s) Oral daily  montelukast 10 milliGRAM(s) Oral daily  polyethylene glycol 3350 17 Gram(s) Oral daily  sacubitril 24 mG/valsartan 26 mG 1 Tablet(s) Oral two times a day  senna 2 Tablet(s) Oral at bedtime  sertraline 25 milliGRAM(s) Oral daily    PRN Inpatient Medications  acetaminophen     Tablet .. 650 milliGRAM(s) Oral every 6 hours PRN  albuterol    90 MICROgram(s) HFA Inhaler 2 Puff(s) Inhalation every 6 hours PRN  ondansetron Injectable 4 milliGRAM(s) IV Push every 6 hours PRN  simethicone 80 milliGRAM(s) Chew every 6 hours PRN  traMADol 25 milliGRAM(s) Oral every 8 hours PRN        VITALS/PHYSICAL EXAM  --------------------------------------------------------------------------------  T(C): 36.6 (04-04-24 @ 05:37), Max: 37.4 (04-03-24 @ 20:49)  HR: 74 (04-04-24 @ 08:56) (71 - 74)  BP: 145/69 (04-04-24 @ 08:56) (120/61 - 153/70)  RR: 18 (04-04-24 @ 05:37) (18 - 20)  SpO2: --  Wt(kg): --        Physical Exam:  	Gen: NAD  	Pulm: decrease BS  B/L  	CV:  S1S2; no rub  	Abd: +distended  	Vascular access:tdc    LABS/STUDIES  --------------------------------------------------------------------------------              8.3    7.01  >-----------<  143      [04-04-24 @ 06:44]              26.5     141  |  102  |  34  ----------------------------<  102      [04-04-24 @ 06:44]  3.3   |  29  |  3.9        Ca     7.9     [04-04-24 @ 06:44]      Mg     1.9     [04-04-24 @ 06:44]        Creatinine Trend:  SCr 3.9 [04-04 @ 06:44]  SCr 3.2 [04-03 @ 08:13]  SCr 4.1 [04-02 @ 06:42]  SCr 6.2 [04-01 @ 06:46]  SCr 6.1 [03-31 @ 08:59]    Urinalysis - [04-04-24 @ 06:44]      Color  / Appearance  / SG  / pH       Gluc 102 / Ketone   / Bili  / Urobili        Blood  / Protein  / Leuk Est  / Nitrite       RBC  / WBC  / Hyaline  / Gran  / Sq Epi  / Non Sq Epi  / Bacteria       Iron 33, TIBC 147, %sat 22      [04-04-24 @ 06:44]  PTH -- (Ca 8.2)      [03-24-24 @ 20:00]   138  Vitamin D (25OH) 9      [03-24-24 @ 20:00]  Lipid: chol 144, , HDL 39, LDL --      [03-22-24 @ 06:14]    HBsAb Nonreact      [03-27-24 @ 15:55]  HBsAg Nonreact      [04-01-24 @ 15:52]  HCV 0.20, Nonreact      [04-01-24 @ 15:52]

## 2024-04-04 NOTE — PROGRESS NOTE ADULT - ASSESSMENT
81 yo  female patient (Rastafari) with hx of CKD stage 5, HTN, DVT/PE 2018 (thought provoked by travel) completed 6m of AC then 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, NICM - HFrEF(LVEF 35-40% in 9/22) s/p AICD, RA, CKD stage 5. Currently on Macrobid for UTI (pt was discharge from [facility] 3/11 papers in her chart) presents to the ED for fluctuating mental status and abdominal pain with occasional CP.  Nephrology was consulted for OMERO over CKD stage 5.  # OMERO on CKD stage 5  Likely ATN precipitated by pre-renal etiology  - S/p TDC, hd today standard bath uf 1 liter as tolerated   - off  sodium bicarb po   - dose medications for iHD  # MBD  - d/ c phoslo  # anemia of CKD  - sat on the low side/ ferritin pending   - patient is Rastafari, will not accept blood products  # HTN  Controlled  [facility]  Will follow

## 2024-04-04 NOTE — DISCHARGE NOTE PROVIDER - NSDCFUADDAPPT_GEN_ALL_CORE_FT
confirmed 1st  HD Tuesday 4/9 at 14:30 PM, HD plan on [id number]. Pt  Continue HD at 13:30 T-Thur-Sat.

## 2024-04-04 NOTE — PROGRESS NOTE ADULT - SUBJECTIVE AND OBJECTIVE BOX
24H events:    Patient is a 88y old Female who presents with a chief complaint of AMS (03 Apr 2024 08:06)    Primary diagnosis of OMERO (acute kidney injury)      Day 1:  Day 2:  Day 3:     Today is hospital day 12d. This morning patient was seen and examined at bedside, resting comfortably in bed.    No acute or major events overnight.    Code Status:    Family communication:  Contact date:  Name of person contacted:  Relationship to patient:  Communication details:  What matters most:    PAST MEDICAL & SURGICAL HISTORY  Chronic kidney disease    Hypertension    Gout    Diverticulitis  sigmoid    Rheumatoid arthritis    DVT, lower extremity  Bilateral    Pulmonary embolism    Chronic HFrEF (heart failure with reduced ejection fraction)    AICD (automatic cardioverter/defibrillator) present    Artificial pacemaker    History of appendectomy    History of tonsillectomy    History of hysterectomy    H/O hernia repair      SOCIAL HISTORY:  Social History:      ALLERGIES:  Keflex (Swelling)  cephalosporins (Angioedema)    MEDICATIONS:  STANDING MEDICATIONS  atorvastatin 80 milliGRAM(s) Oral at bedtime  bisacodyl Suppository 10 milliGRAM(s) Rectal at bedtime  calcitriol   Capsule 0.25 MICROGram(s) Oral daily  calcium acetate 1334 milliGRAM(s) Oral every 12 hours  chlorhexidine 2% Cloths 1 Application(s) Topical daily  cholecalciferol 1000 Unit(s) Oral daily  folic acid 1 milliGRAM(s) Oral daily  isosorbide   mononitrate ER Tablet (IMDUR) 30 milliGRAM(s) Oral daily  metoprolol succinate ER 50 milliGRAM(s) Oral daily  mirtazapine 7.5 milliGRAM(s) Oral daily  montelukast 10 milliGRAM(s) Oral daily  polyethylene glycol 3350 17 Gram(s) Oral daily  senna 2 Tablet(s) Oral at bedtime  sertraline 25 milliGRAM(s) Oral daily    PRN MEDICATIONS  acetaminophen     Tablet .. 650 milliGRAM(s) Oral every 6 hours PRN  albuterol    90 MICROgram(s) HFA Inhaler 2 Puff(s) Inhalation every 6 hours PRN  ondansetron Injectable 4 milliGRAM(s) IV Push every 6 hours PRN  simethicone 80 milliGRAM(s) Chew every 6 hours PRN  traMADol 25 milliGRAM(s) Oral every 8 hours PRN    VITALS:   T(F): 99  HR: 76  BP: 142/65  RR: 19  SpO2: 100%    PHYSICAL EXAM:  GENERAL:   (  x) NAD, lying in bed comfortably     (  ) obtunded     (  ) lethargic     (  ) somnolent    HEAD:   (x  ) Atraumatic     (  ) hematoma     (  ) laceration (specify location:       )     NECK:  (x  ) Supple     (  ) neck stiffness     (  ) nuchal rigidity     (  )  no JVD     (  ) JVD present ( -- cm)    HEART:  Rate -->     (  x) normal rate     (  ) bradycardic     (  ) tachycardic  Rhythm -->     ( x ) regular     (  ) regularly irregular     (  ) irregularly irregular  Murmurs -->     (  ) normal s1s2     (  ) systolic murmur     (  ) diastolic murmur     (  ) continuous murmur      (  ) S3 present     (  ) S4 present    LUNGS:   (  x)Unlabored respirations     (  ) tachypnea  ( x ) B/L air entry     (  ) decreased breath sounds in:  (location     )    (  ) no adventitious sound     (  ) crackles     (  ) wheezing      (  ) rhonchi      (specify location:       )  (  ) chest wall tenderness (specify location:       )    ABDOMEN:   ( x ) Soft     (  ) tense   |   (x  ) nondistended     (  ) distended   |   (  ) +BS     (  ) hypoactive bowel sounds     (  ) hyperactive bowel sounds  (  x) nontender     (  ) RUQ tenderness     (  ) RLQ tenderness     (  ) LLQ tenderness     (  ) epigastric tenderness     (  ) diffuse tenderness  (  ) Splenomegaly      (  ) Hepatomegaly      (  ) Jaundice     (  ) ecchymosis     EXTREMITIES:  ( x ) Normal     (  ) Rash     (  ) ecchymosis     (  ) varicose veins      (  ) pitting edema     (  ) non-pitting edema   (  ) ulceration     (  ) gangrene:     (location:     )    NERVOUS SYSTEM:    ( x ) A&Ox3     (  ) confused     (  ) lethargic  CN II-XII:     (  ) Intact     (  ) deficits found     (Specify:     )   Upper extremities:     (  ) no sensorimotor deficits     (  ) weakness     (  ) loss of proprioception/vibration     (  ) loss of touch/temperature (specify:    )  Lower extremities:     (  ) no sensorimotor deficits     (  ) weakness     (  ) loss of proprioception/vibration     (  ) loss of touch/temperature (specify:    )    SKIN:   (x  ) No rashes or lesions     (  ) maculopapular rash     (  ) pustules     (  ) vesicles     (  ) ulcer     (  ) ecchymosis     (specify location:     )    AMPAC score:    (  ) Indwelling Morgan Catheter:   Date insterted:    Reason (  ) Critical illness     (  ) urinary retention    (  ) Accurate Ins/Outs Monitoring     (  ) CMO patient    (  ) Central Line:   Date inserted:  Location: (  ) Right IJ     (  ) Left IJ     (  ) Right Fem     (  ) Left Fem    (  ) SPC        (  ) pigtail       (  ) PEG tube       (  ) colostomy       (  ) jejunostomy  (  ) U-Dall    LABS:                        8.0    9.61  )-----------( 142      ( 02 Apr 2024 06:42 )             25.7     04-02    142  |  108  |  47<H>  ----------------------------<  61<L>  4.6   |  27  |  4.1<HH>    Ca    7.6<L>      02 Apr 2024 06:42  Phos  2.8     04-02  Mg     1.8     04-02    TPro  4.7<L>  /  Alb  2.6<L>  /  TBili  0.3  /  DBili  x   /  AST  33  /  ALT  14  /  AlkPhos  82  04-02      Urinalysis Basic - ( 02 Apr 2024 06:42 )    Color: x / Appearance: x / SG: x / pH: x  Gluc: 61 mg/dL / Ketone: x  / Bili: x / Urobili: x   Blood: x / Protein: x / Nitrite: x   Leuk Esterase: x / RBC: x / WBC x   Sq Epi: x / Non Sq Epi: x / Bacteria: x                RADIOLOGY:           24H events:    Patient is a 88y old Female who presents with a chief complaint of AMS (03 Apr 2024 08:06)    Primary diagnosis of OMERO (acute kidney injury)    Pt having diarrhea and nausea. Daughter bedside. Questions answered.     PAST MEDICAL & SURGICAL HISTORY  Chronic kidney disease    Hypertension    Gout    Diverticulitis  sigmoid    Rheumatoid arthritis    DVT, lower extremity  Bilateral    Pulmonary embolism    Chronic HFrEF (heart failure with reduced ejection fraction)    AICD (automatic cardioverter/defibrillator) present    Artificial pacemaker    History of appendectomy    History of tonsillectomy    History of hysterectomy    H/O hernia repair      SOCIAL HISTORY:  Social History:      ALLERGIES:  Keflex (Swelling)  cephalosporins (Angioedema)    MEDICATIONS:  STANDING MEDICATIONS  atorvastatin 80 milliGRAM(s) Oral at bedtime  bisacodyl Suppository 10 milliGRAM(s) Rectal at bedtime  calcitriol   Capsule 0.25 MICROGram(s) Oral daily  calcium acetate 1334 milliGRAM(s) Oral every 12 hours  chlorhexidine 2% Cloths 1 Application(s) Topical daily  cholecalciferol 1000 Unit(s) Oral daily  folic acid 1 milliGRAM(s) Oral daily  isosorbide   mononitrate ER Tablet (IMDUR) 30 milliGRAM(s) Oral daily  metoprolol succinate ER 50 milliGRAM(s) Oral daily  mirtazapine 7.5 milliGRAM(s) Oral daily  montelukast 10 milliGRAM(s) Oral daily  polyethylene glycol 3350 17 Gram(s) Oral daily  senna 2 Tablet(s) Oral at bedtime  sertraline 25 milliGRAM(s) Oral daily    PRN MEDICATIONS  acetaminophen     Tablet .. 650 milliGRAM(s) Oral every 6 hours PRN  albuterol    90 MICROgram(s) HFA Inhaler 2 Puff(s) Inhalation every 6 hours PRN  ondansetron Injectable 4 milliGRAM(s) IV Push every 6 hours PRN  simethicone 80 milliGRAM(s) Chew every 6 hours PRN  traMADol 25 milliGRAM(s) Oral every 8 hours PRN    VITALS:   T(F): 99  HR: 76  BP: 142/65  RR: 19  SpO2: 100%    CONSTITUTIONAL: NAD  ENMT: Oral mucosa with moist membranes. Normal dentition; no pharyngeal injection or exudates   NECK: Supple, symmetric and without tracheal deviation   RESP: No respiratory distress, no use of accessory muscles; CTA b/l, no WRR  CV: RRR, +S1S2, no MRG; no JVD; no peripheral edema  GI: Soft, NT, ND, no rebound, no guarding; no palpable masses; no hepatosplenomegaly; no hernia palpated  MSK:  No digital clubbing or cyanosis; examination of the (head/neck/spine/ribs/pelvis, RUE, LUE, RLE, LLE) without misalignment,   SKIN: Rt chest TDC in place  PSYCH: Appropriate insight/judgment; A+O x 3      LABS:                        8.0    9.61  )-----------( 142      ( 02 Apr 2024 06:42 )             25.7     04-02    142  |  108  |  47<H>  ----------------------------<  61<L>  4.6   |  27  |  4.1<HH>    Ca    7.6<L>      02 Apr 2024 06:42  Phos  2.8     04-02  Mg     1.8     04-02    TPro  4.7<L>  /  Alb  2.6<L>  /  TBili  0.3  /  DBili  x   /  AST  33  /  ALT  14  /  AlkPhos  82  04-02      Urinalysis Basic - ( 02 Apr 2024 06:42 )    Color: x / Appearance: x / SG: x / pH: x  Gluc: 61 mg/dL / Ketone: x  / Bili: x / Urobili: x   Blood: x / Protein: x / Nitrite: x   Leuk Esterase: x / RBC: x / WBC x   Sq Epi: x / Non Sq Epi: x / Bacteria: x                RADIOLOGY:

## 2024-04-04 NOTE — DISCHARGE NOTE PROVIDER - NSDCMRMEDTOKEN_GEN_ALL_CORE_FT
Albuterol (Eqv-Proventil HFA) 90 mcg/inh inhalation aerosol: 2 puff(s) inhaled every 6 hours as needed for  shortness of breath and/or wheezing  amLODIPine 5 mg oral tablet: 1 tab(s) orally once a day (at bedtime)  apixaban 2.5 mg oral tablet: 1 tab(s) orally 2 times a day  atorvastatin 80 mg oral tablet: 1 tab(s) orally once a day (at bedtime)  calcium acetate 667 mg oral capsule: 2 cap(s) orally 2 times a day  Entresto 24 mg-26 mg oral tablet: 1 tab(s) orally 2 times a day  ferrous sulfate 325 mg (65 mg elemental iron) oral tablet: 1 tab(s) orally every other day  folic acid 1 mg oral tablet: 1 tab(s) orally once a day  furosemide 40 mg oral tablet: 1 tab(s) orally once a day  Imdur 30 mg oral tablet, extended release: 1 tab(s) orally once a day  latanoprost 0.005% ophthalmic solution: 1 drop(s) in each eye 2 times a day  Macrodantin 100 mg oral capsule: 1 cap(s) orally 2 times a day  metoprolol succinate 50 mg oral capsule, extended release: 1 cap(s) orally once a day  montelukast 10 mg oral tablet: 1 tab(s) orally once a day  olopatadine 0.2% ophthalmic solution: 1 drop(s) in each affected eye once a day  Plavix 75 mg oral tablet: 1 tab(s) orally once a day  predniSONE 20 mg oral tablet: 1 tab(s) orally once a day for 3 days  sertraline 25 mg oral tablet: 1 tab(s) orally once a day  Timolol Maleate, Ophthalmic 0.5% preservative-free ophthalmic solution: 1 drop(s) to both eyes 2 times a day   Albuterol (Eqv-Proventil HFA) 90 mcg/inh inhalation aerosol: 2 puff(s) inhaled every 6 hours as needed for  shortness of breath and/or wheezing  apixaban 2.5 mg oral tablet: 1 tab(s) orally 2 times a day  atorvastatin 80 mg oral tablet: 1 tab(s) orally once a day (at bedtime)  Entresto 24 mg-26 mg oral tablet: 1 tab(s) orally 2 times a day  ferrous sulfate 325 mg (65 mg elemental iron) oral tablet: 1 tab(s) orally every other day  folic acid 1 mg oral tablet: 1 tab(s) orally once a day  furosemide 40 mg oral tablet: 1 tab(s) orally once a day  Imdur 30 mg oral tablet, extended release: 1 tab(s) orally once a day  latanoprost 0.005% ophthalmic solution: 1 drop(s) in each eye 2 times a day  metoprolol succinate 50 mg oral capsule, extended release: 1 cap(s) orally once a day  montelukast 10 mg oral tablet: 1 tab(s) orally once a day  olopatadine 0.2% ophthalmic solution: 1 drop(s) in each affected eye once a day  sertraline 25 mg oral tablet: 1 tab(s) orally once a day  Timolol Maleate, Ophthalmic 0.5% preservative-free ophthalmic solution: 1 drop(s) to both eyes 2 times a day   Albuterol (Eqv-Proventil HFA) 90 mcg/inh inhalation aerosol: 2 puff(s) inhaled every 6 hours as needed for  shortness of breath and/or wheezing  apixaban 2.5 mg oral tablet: 1 tab(s) orally 2 times a day  atorvastatin 80 mg oral tablet: 1 tab(s) orally once a day (at bedtime)  calcitriol 0.5 mcg oral capsule: 1 cap(s) orally 2 times a day  Entresto 24 mg-26 mg oral tablet: 1 tab(s) orally 2 times a day  ferrous sulfate 325 mg (65 mg elemental iron) oral tablet: 1 tab(s) orally every other day  folic acid 1 mg oral tablet: 1 tab(s) orally once a day  furosemide 40 mg oral tablet: 1 tab(s) orally once a day  Imdur 30 mg oral tablet, extended release: 1 tab(s) orally once a day  latanoprost 0.005% ophthalmic solution: 1 drop(s) in each eye 2 times a day  metoprolol succinate 50 mg oral capsule, extended release: 1 cap(s) orally once a day  olopatadine 0.2% ophthalmic solution: 1 drop(s) in each affected eye once a day  sertraline 25 mg oral tablet: 1 tab(s) orally once a day  Timolol Maleate, Ophthalmic 0.5% preservative-free ophthalmic solution: 1 drop(s) to both eyes 2 times a day

## 2024-04-04 NOTE — DISCHARGE NOTE PROVIDER - NSDCCPCAREPLAN_GEN_ALL_CORE_FT
PRINCIPAL DISCHARGE DIAGNOSIS  Diagnosis: OMERO (acute kidney injury)  Assessment and Plan of Treatment: You were noted to have a temporary insult to your kidney function either at the time that you arrived at the hospital or during your stay here. We have monitored your kidney function with blood work during your time here and you are at a level that no longer requires continued hospital level care, but we do recommend that you follow up to continually have your kidney function checked. You can follow up with your primary care doctor, or, if recommended in the discharge paperwork, you should follow up with a Kidney specialist called a Nephrologist.       PRINCIPAL DISCHARGE DIAGNOSIS  Diagnosis: OMERO (acute kidney injury)  Assessment and Plan of Treatment: Your kidney disease progressed to the point of requiring hemodialysis. A TDC was placed and you were able to be dialyzed. Dialysis has been set up and information has been provided to you.

## 2024-04-04 NOTE — DISCHARGE NOTE PROVIDER - HOSPITAL COURSE
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  PAtient states she has lower abdominal pain with persistent dysuria. She says she had CP but doesn't remember when and how it was.  Per the ED note, patient's grandson said she is normally AAOx3 with good cognition but since her hospitalization she has not been herself. Tonight they tried to get her ready for bed and she was combative in trying to get her in her pajamas so he brought her to the ED.     ED vitals normal. labs showed hb 10 (at BL), Na 127, K 6.5 hemolyzed --> 4.9 on vbg, AG 16, BUN/CRea 119/4.8, , AST 53,   trop 74 --> 78, pro BNP 12.9K   EKG: NSR, vpaced, new TWI in inferolateral leads     CT abd pelv: 1. Prominent fluid-filled loops of small bowel with mild mucosal enhancement, may represent enteritis in the appropriate clinical setting.  2. The urinary bladder is thickened consistent with patient's current urinary tract infection.  Other chronic/incidental findings as above.  CTH: No acute intracranial pathology.    Patient received levaquin and was admitted to telemetry for AMS and chest pain w/u and management.     Floor course:  -Patient found to have OMERO on CKD5. Nephrology consulted, patient underwent TDC placement on 3/29 and had HD sessions 4/1 and 4/2. Outpatient HD arranged prior to discharge.   -Hospital course complicated by PNA, patient completed course of Levaquin.   -EP interrogated AICD, no events.   -Patient is afebrile and hemodynamically stable. She is medically cleared for discharge.     A/P:  RACHEL MARRUFO is a 87 yo woman with h/o htn, DVT/PE in 2018 and in 2019, HFrEF s/p aicd and ckd V who p/w AMS and abdominal pain after being discharged from osh ([facility]) on 3/11 to complete macrobid abx course and now p/w ams and confusion with OMERO on CKD5    #Metabolic encephalopathy, improved  #OMERO on CKD stage V--2/2 dehydration and poor oral intake  #Resolved Cystitis  #Diarrhea---Enteritis  #HFrEF -- s/p Aicd  #h/o DVT/PE  - tele monitoring  - continue to monitor bmp closely  - started vit D  calcitriol 0.25 mcg po q24h   - hold nephrotoxic meds  - closely monitor vs-----can hold additional bp meds if becomes hypotensive (both entresto and lasix on hold---restart as cr and bp tolerates)  - lokelma 10g q24h for elevated potassium  - check stool study pending collection  - eps interrogated device   - f/u ua/ucr/ulytes -> wnl   - primafit to monitor and document I/o  - repeat UA neg  - repeat Blood cultures neg  - palliative care GOC conversation continuing with HCP -> HCP and patient want dialysis as per last palliative note  - nephrology following--- TDC placed by IR 4/1 and  RRT done 4/1 and 4/2  - outpatient HD arranged prior to dc     #suspected pneumonia  #perihilar opacities  - CXR showing perihilar opacities in setting of overnight fever and tachycardia  - s/p Levofloxacin 500mg q48h x 5 days     # HTN  -amLODIPine   Tablet 5 milliGRAM(s) Oral at bedtime  -isosorbide   mononitrate ER Tablet (IMDUR) 30 milliGRAM(s) Oral daily  -metoprolol succinate ER 50 milliGRAM(s) Oral daily    # Hx of dvt/pe  - on apixaban 2.5 po bid     # HLD  -atorvastatin 80 milliGRAM(s) Oral at bedtime     # Depression   - strated mirtazapine 7.5 milliGRAM(s) Oral daily for mood and appetite stimulation  - sertraline 25 milliGRAM(s) Oral daily    #Diet: Pureed renal restricted   #DVT ppx: Eliquis  #GI ppx: Not indicated  #Activity: IAT  #HCP: Leona Marrufo - [phone]       HPI:  89yo  female patient ([employer]oro[employer]ism) with hx of HTN, DVT/PE 2018 (thought provoked by travel) completed 6m of AC then 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, NICM - HFrEF(LVEF 35-40% in 9/22) s/p AICD, RA, CKD stage 5   --> currently on Macrobid for UTI (pt was discharge from [facility] 3/11 papers in her chart)  presents to the ED for fluctuating mental status and abdominal pain with occasional CP.  PAtient states she has lower abdominal pain with persistent dysuria. She says she had CP but doesn't remember when and how it was.  Per the ED note, patient's grandson said she is normally AAOx3 with good cognition but since her hospitalization she has not been herself. Tonight they tried to get her ready for bed and she was combative in trying to get her in her pajamas so he brought her to the ED.     ED vitals normal. labs showed hb 10 (at BL), Na 127, K 6.5 hemolyzed --> 4.9 on vbg, AG 16, BUN/CRea 119/4.8, , AST 53,   trop 74 --> 78, pro BNP 12.9K   EKG: NSR, vpaced, new TWI in inferolateral leads     CT abd pelv: 1. Prominent fluid-filled loops of small bowel with mild mucosal enhancement, may represent enteritis in the appropriate clinical setting.  2. The urinary bladder is thickened consistent with patient's current urinary tract infection.  Other chronic/incidental findings as above.  CTH: No acute intracranial pathology.    Patient received levaquin and was admitted to telemetry for AMS and chest pain w/u and management.     Floor course:  -Patient found to have OMERO on CKD5. Nephrology consulted, patient underwent TDC placement on 3/29 and HD sessions initiated. 4/1, 4/2, 4/4.  Outpatient HD arranged starting Tuesday 4/9 at 14:30 PM, HD plan on [id number].   -Hospital course complicated by PNA, patient completed course of Levaquin.   -EP interrogated AICD, no events.   -Patient is afebrile and hemodynamically stable. She is medically cleared for discharge.     A/P:  RACHEL MARRUFO is a 89 yo woman with h/o htn, DVT/PE in 2018 and in 2019, HFrEF s/p aicd and ckd V who p/w AMS and abdominal pain after being discharged from osh ([facility]) on 3/11 to complete macrobid abx course and now p/w ams and confusion with OMERO on CKD5    #Metabolic encephalopathy, improved  #OMERO on CKD stage V--2/2 dehydration and poor oral intake  #Resolved Cystitis  #Diarrhea---Enteritis  #HFrEF -- s/p Aicd  #h/o DVT/PE  - tele monitoring  - continue to monitor bmp closely  - started vit D  calcitriol 0.25 mcg po q24h   - hold nephrotoxic meds  - closely monitor vs-----can hold additional bp meds if becomes hypotensive (both entresto and lasix on hold---restart as cr and bp tolerates)  - lokelma 10g q24h for elevated potassium  - check stool study pending collection  - eps interrogated device   - f/u ua/ucr/ulytes -> wnl   - primafit to monitor and document I/o  - repeat UA neg  - repeat Blood cultures neg  - palliative care GOC conversation continuing with HCP -> HCP and patient want dialysis as per last palliative note  - nephrology following  - patient underwent TDC placement on 3/29 and HD sessions initiated. 4/1, 4/2, 4/4.  Outpatient HD arranged starting Tuesday 4/9 at 14:30 PM, HD plan on [id number].     #suspected pneumonia  #perihilar opacities  - CXR showing perihilar opacities in setting of overnight fever and tachycardia  - s/p Levofloxacin 500mg q48h x 5 days     # HTN  -amLODIPine   Tablet 5 milliGRAM(s) Oral at bedtime  -isosorbide   mononitrate ER Tablet (IMDUR) 30 milliGRAM(s) Oral daily  -metoprolol succinate ER 50 milliGRAM(s) Oral daily    # Hx of dvt/pe  - on apixaban 2.5 po bid     # HLD  -atorvastatin 80 milliGRAM(s) Oral at bedtime     # Depression   - strated mirtazapine 7.5 milliGRAM(s) Oral daily for mood and appetite stimulation  - sertraline 25 milliGRAM(s) Oral daily    #Diet: Pureed renal restricted   #DVT ppx: Eliquis  #GI ppx: Not indicated  #Activity: IAT  #HCP: Leona Marrufo - [phone]       RACHEL MARRUFO is a 89 yo woman with h/o htn, DVT/PE in 2018 and in 2019, HFrEF s/p aicd and ckd V who p/w AMS and abdominal pain after being discharged from osh ([facility]) on 3/11 to complete macrobid abx course and now p/w ams and confusion with OMERO on CKD5      #Metabolic encephalopathy, improved  #ESRD now on new HD  #Resolved Cystitis  #Diarrhea---Enteritis  #HFrEF -- s/p Aicd  #h/o DVT/PE  TDC placed by IR 4/1 and  RRT done 4/1 and 4/2  - secondary hyperPTH due to ESRD so changed calcitriol to 0.5mg bid until PTH and CA are wnl  - SBP >100, resumed entresto  - pt urinating; monitor BP and then can also possibly resume lasix  - stop phos binders  - not on lokelma  - ICD interrogation wnl  - infectious workup negative  - continue lasix 40mg po qd  - op HD set up to begin 4/9 so can be discharged after HD session on 4/6    #suspected pneumonia  #perihilar opacities  - CXR showing perihilar opacities   - completed Levofloxacin 500mg q48h x 5 days     # HTN  - pt not on amlodipine  - can continue imdur, toprol, and now entresto  - continue lasix 40mg po qd      # Hx of dvt/pe  - on apixaban 2.5 po bid resumed    # HLD  atorvastatin 80 milliGRAM(s) Oral at bedtime     # Depression   - stopped mirtazapine  - sertraline 25 milliGRAM(s) Oral daily    #anemia  - in setting of ESRD  - pt is a [employer]ism  - pediatric sized blood draws; minimize frequency    #Diet: bite sized renal restricted   #DVT ppx: Eliquis   #GI ppx: Not indicated  #Activity: IAT  #HCP: Leona Marrufo - [phone]

## 2024-04-05 ENCOUNTER — TRANSCRIPTION ENCOUNTER (OUTPATIENT)
Age: 89
End: 2024-04-05

## 2024-04-05 LAB
ANION GAP SERPL CALC-SCNC: 14 MMOL/L — SIGNIFICANT CHANGE UP (ref 7–14)
BASOPHILS # BLD AUTO: 0.05 K/UL — SIGNIFICANT CHANGE UP (ref 0–0.2)
BASOPHILS NFR BLD AUTO: 0.7 % — SIGNIFICANT CHANGE UP (ref 0–1)
BUN SERPL-MCNC: 22 MG/DL — HIGH (ref 10–20)
CALCIUM SERPL-MCNC: 8.4 MG/DL — SIGNIFICANT CHANGE UP (ref 8.4–10.5)
CHLORIDE SERPL-SCNC: 97 MMOL/L — LOW (ref 98–110)
CO2 SERPL-SCNC: 26 MMOL/L — SIGNIFICANT CHANGE UP (ref 17–32)
CREAT SERPL-MCNC: 2.7 MG/DL — HIGH (ref 0.7–1.5)
EGFR: 16 ML/MIN/1.73M2 — LOW
EOSINOPHIL # BLD AUTO: 0.69 K/UL — SIGNIFICANT CHANGE UP (ref 0–0.7)
EOSINOPHIL NFR BLD AUTO: 9.5 % — HIGH (ref 0–8)
GLUCOSE SERPL-MCNC: 102 MG/DL — HIGH (ref 70–99)
HCT VFR BLD CALC: 31.4 % — LOW (ref 37–47)
HGB BLD-MCNC: 9.5 G/DL — LOW (ref 12–16)
IMM GRANULOCYTES NFR BLD AUTO: 0.7 % — HIGH (ref 0.1–0.3)
LYMPHOCYTES # BLD AUTO: 1.73 K/UL — SIGNIFICANT CHANGE UP (ref 1.2–3.4)
LYMPHOCYTES # BLD AUTO: 23.8 % — SIGNIFICANT CHANGE UP (ref 20.5–51.1)
MAGNESIUM SERPL-MCNC: 1.9 MG/DL — SIGNIFICANT CHANGE UP (ref 1.8–2.4)
MCHC RBC-ENTMCNC: 28.2 PG — SIGNIFICANT CHANGE UP (ref 27–31)
MCHC RBC-ENTMCNC: 30.3 G/DL — LOW (ref 32–37)
MCV RBC AUTO: 93.2 FL — SIGNIFICANT CHANGE UP (ref 81–99)
MONOCYTES # BLD AUTO: 1 K/UL — HIGH (ref 0.1–0.6)
MONOCYTES NFR BLD AUTO: 13.8 % — HIGH (ref 1.7–9.3)
NEUTROPHILS # BLD AUTO: 3.74 K/UL — SIGNIFICANT CHANGE UP (ref 1.4–6.5)
NEUTROPHILS NFR BLD AUTO: 51.5 % — SIGNIFICANT CHANGE UP (ref 42.2–75.2)
NRBC # BLD: 0 /100 WBCS — SIGNIFICANT CHANGE UP (ref 0–0)
PLATELET # BLD AUTO: 162 K/UL — SIGNIFICANT CHANGE UP (ref 130–400)
PMV BLD: 10.2 FL — SIGNIFICANT CHANGE UP (ref 7.4–10.4)
POTASSIUM SERPL-MCNC: 3.7 MMOL/L — SIGNIFICANT CHANGE UP (ref 3.5–5)
POTASSIUM SERPL-SCNC: 3.7 MMOL/L — SIGNIFICANT CHANGE UP (ref 3.5–5)
RBC # BLD: 3.37 M/UL — LOW (ref 4.2–5.4)
RBC # FLD: 13.2 % — SIGNIFICANT CHANGE UP (ref 11.5–14.5)
SODIUM SERPL-SCNC: 137 MMOL/L — SIGNIFICANT CHANGE UP (ref 135–146)
WBC # BLD: 7.26 K/UL — SIGNIFICANT CHANGE UP (ref 4.8–10.8)
WBC # FLD AUTO: 7.26 K/UL — SIGNIFICANT CHANGE UP (ref 4.8–10.8)

## 2024-04-05 PROCEDURE — 99232 SBSQ HOSP IP/OBS MODERATE 35: CPT

## 2024-04-05 RX ORDER — CLOPIDOGREL BISULFATE 75 MG/1
1 TABLET, FILM COATED ORAL
Refills: 0 | DISCHARGE

## 2024-04-05 RX ORDER — NITROFURANTOIN MACROCRYSTAL 50 MG
1 CAPSULE ORAL
Refills: 0 | DISCHARGE

## 2024-04-05 RX ORDER — AMLODIPINE BESYLATE 2.5 MG/1
1 TABLET ORAL
Qty: 0 | Refills: 0 | DISCHARGE

## 2024-04-05 RX ORDER — MONTELUKAST 4 MG/1
1 TABLET, CHEWABLE ORAL
Qty: 0 | Refills: 0 | DISCHARGE
Start: 2024-04-05

## 2024-04-05 RX ORDER — MONTELUKAST 4 MG/1
1 TABLET, CHEWABLE ORAL
Refills: 0 | DISCHARGE

## 2024-04-05 RX ORDER — CALCIUM ACETATE 667 MG
2 TABLET ORAL
Refills: 0 | DISCHARGE

## 2024-04-05 RX ADMIN — CHLORHEXIDINE GLUCONATE 1 APPLICATION(S): 213 SOLUTION TOPICAL at 12:11

## 2024-04-05 RX ADMIN — MONTELUKAST 10 MILLIGRAM(S): 4 TABLET, CHEWABLE ORAL at 12:12

## 2024-04-05 RX ADMIN — Medication 40 MILLIGRAM(S): at 05:28

## 2024-04-05 RX ADMIN — APIXABAN 2.5 MILLIGRAM(S): 2.5 TABLET, FILM COATED ORAL at 05:28

## 2024-04-05 RX ADMIN — Medication 50 MILLIGRAM(S): at 05:28

## 2024-04-05 RX ADMIN — CALCITRIOL 0.5 MICROGRAM(S): 0.5 CAPSULE ORAL at 17:44

## 2024-04-05 RX ADMIN — Medication 1 MILLIGRAM(S): at 12:13

## 2024-04-05 RX ADMIN — SERTRALINE 25 MILLIGRAM(S): 25 TABLET, FILM COATED ORAL at 12:13

## 2024-04-05 RX ADMIN — ATORVASTATIN CALCIUM 80 MILLIGRAM(S): 80 TABLET, FILM COATED ORAL at 21:25

## 2024-04-05 RX ADMIN — Medication 10 MILLIGRAM(S): at 21:25

## 2024-04-05 RX ADMIN — ISOSORBIDE MONONITRATE 30 MILLIGRAM(S): 60 TABLET, EXTENDED RELEASE ORAL at 12:14

## 2024-04-05 RX ADMIN — APIXABAN 2.5 MILLIGRAM(S): 2.5 TABLET, FILM COATED ORAL at 17:43

## 2024-04-05 RX ADMIN — CALCITRIOL 0.5 MICROGRAM(S): 0.5 CAPSULE ORAL at 05:28

## 2024-04-05 RX ADMIN — SACUBITRIL AND VALSARTAN 1 TABLET(S): 24; 26 TABLET, FILM COATED ORAL at 05:28

## 2024-04-05 RX ADMIN — SACUBITRIL AND VALSARTAN 1 TABLET(S): 24; 26 TABLET, FILM COATED ORAL at 17:42

## 2024-04-05 NOTE — PROGRESS NOTE ADULT - ASSESSMENT
RACHEL MARRUFO is a 89 yo woman with h/o htn, DVT/PE in 2018 and in 2019, HFrEF s/p aicd and ckd V who p/w AMS and abdominal pain after being discharged from osh ([facility]) on 3/11 to complete macrobid abx course and now p/w ams and confusion with OMERO on CKD5      #Metabolic encephalopathy, improved  #ESRD now on new HD  #Resolved Cystitis  #Diarrhea---Enteritis  #HFrEF -- s/p Aicd  #h/o DVT/PE  TDC placed by IR 4/1 and  RRT done 4/1 and 4/2  - secondary hyperPTH due to ESRD so changed calcitriol to 0.5mg bid until PTH and CA are wnl  - SBP >100, resumed entresto  - pt urinating; monitor BP and then can also possibly resume lasix  - stop phos binders  - not on lokelma  - ICD interrogation wnl  - infectious workup negative  - continue lasix 40mg po qd  - op HD set up to begin 4/9 so can be discharged after HD session on 4/6    #suspected pneumonia  #perihilar opacities  - CXR showing perihilar opacities   - completed Levofloxacin 500mg q48h x 5 days     # HTN  - pt not on amlodipine  - can continue imdur, toprol, and now entresto  - continue lasix 40mg po qd      # Hx of dvt/pe  - on apixaban 2.5 po bid resumed    # HLD  atorvastatin 80 milliGRAM(s) Oral at bedtime     # Depression   - stopped mirtazapine  - sertraline 25 milliGRAM(s) Oral daily    #anemia  - in setting of ESRD  - pt is a Yarsani  - pediatric sized blood draws; minimize frequency    #Diet: bite sized renal restricted   #DVT ppx: Eliquis   #GI ppx: Not indicated  #Activity: IAT  #HCP: Leona Marrufo - [phone]    Pending: discharge tomorrow after HD

## 2024-04-05 NOTE — PROGRESS NOTE ADULT - SUBJECTIVE AND OBJECTIVE BOX
Nephrology progress note    Patient is seen and examined, events over the last 24 h noted .  Denies complaints  Allergies:  Keflex (Swelling)  cephalosporins (Angioedema)    Hospital Medications:   MEDICATIONS  (STANDING):  apixaban 2.5 milliGRAM(s) Oral every 12 hours  atorvastatin 80 milliGRAM(s) Oral at bedtime  bisacodyl Suppository 10 milliGRAM(s) Rectal at bedtime  calcitriol   Capsule 0.5 MICROGram(s) Oral two times a day  chlorhexidine 2% Cloths 1 Application(s) Topical daily  folic acid 1 milliGRAM(s) Oral daily  furosemide    Tablet 40 milliGRAM(s) Oral daily  isosorbide   mononitrate ER Tablet (IMDUR) 30 milliGRAM(s) Oral daily  metoprolol succinate ER 50 milliGRAM(s) Oral daily  montelukast 10 milliGRAM(s) Oral daily  sacubitril 24 mG/valsartan 26 mG 1 Tablet(s) Oral two times a day  sertraline 25 milliGRAM(s) Oral daily        VITALS:  T(F): 98 (04-05-24 @ 12:38), Max: 98.1 (04-04-24 @ 21:49)  HR: 80 (04-05-24 @ 12:38)  BP: 133/67 (04-05-24 @ 12:38)  RR: 18 (04-05-24 @ 12:38)  SpO2: 99% (04-05-24 @ 04:46)  Wt(kg): --    04-04 @ 07:01  -  04-05 @ 07:00  --------------------------------------------------------  IN: 0 mL / OUT: 1000 mL / NET: -1000 mL          PHYSICAL EXAM:  Constitutional: NAD  HEENT: anicteric sclera  Neck: No JVD  Respiratory: CTA  Cardiovascular: S1, S2, RRR  Gastrointestinal: BS+, soft, NT/ND  Extremities:  No peripheral edema  Neurological: A/O x 3  : No CVA tenderness. No carlos.   Skin: No rashes  Vascular Access: TDC    LABS:  04-05    137  |  97<L>  |  22<H>  ----------------------------<  102<H>  3.7   |  26  |  2.7<H>    Ca    8.4      05 Apr 2024 06:43  Mg     1.9     04-05                            9.5    7.26  )-----------( 162      ( 05 Apr 2024 06:43 )             31.4       Urine Studies:  Urinalysis Basic - ( 05 Apr 2024 06:43 )    Color:  / Appearance:  / SG:  / pH:   Gluc: 102 mg/dL / Ketone:   / Bili:  / Urobili:    Blood:  / Protein:  / Nitrite:    Leuk Esterase:  / RBC:  / WBC    Sq Epi:  / Non Sq Epi:  / Bacteria:         RADIOLOGY & ADDITIONAL STUDIES:

## 2024-04-05 NOTE — DISCHARGE NOTE NURSING/CASE MANAGEMENT/SOCIAL WORK - NSSCTYPOFSERV_GEN_ALL_CORE
Referred by: Pablo Rothman MD; Medical Diagnosis (from order):    Diagnosis Information      Diagnosis    719.41 (ICD-9-CM) - M25.511 (ICD-10-CM) - Right shoulder pain, unspecified chronicity                Occupational Therapy -  Daily Treatment Note    Visit: 2    SUBJECTIVE                                                                                                             Patient states she is still in considerable amount of pain.  She still cannot lay in bed and she is unable to drive.  She is able to breathe a little easier.   Functional Change: No report of functional change    Pain / Symptoms:  Pain rating (out of 10): Current: 8     OBJECTIVE                                                                                                                     Range of Motion (ROM) (norms in parentheses, measurement in degrees unless noted):   Shoulder Flexion (180): Right: Passive: 65 pain    Shoulder Behind Back Internal Rotation: Right: Passive: to abdomen pain   Shoulder External Rotation at 0°: Right: Passive: 45 pain        TREATMENT                                                                                                                  Therapeutic Exercise:  Pendulum exercises  Pulleys in standing  theraball for protraction/retraction  Walking around clinic with 1# wt in both hands encouraging natural arm swing  Manual Therapy:  PROM in reclined position into elevation; poor tolerance  PROM into ER/IR  soft tissue mobilization   Vasopneumatic Compression (01737) Game Ready    Location: right shoulder  Position: sitting  Compression: low Temperature: 40° F  Duration: 15 minutes  Un-attended Electrical Stimulation (44828/): Interferential Current  Location: right shoulder  Position: sitting  Pulse Rate:  Hz  Intensity: patient tolerance  Right shoulder  In conjunction with: game ready  Duration: 20 minutes  Results: decreased pain  Reaction: no adverse reaction to  treatment    Skilled input: verbal instruction/cues, tactile instruction/cues and posture correction    Home Exercise Program: (*above indicates provided as part of home exercise program)  3-4 times a day  Pendulum exercises  Protraction/retraction on theraball  Natural arm swing  IR/ER actively as tolerated    April PT saw patient for rib pain during this session:  soft tissue massage to intercostals right rib 5-10, posterior to anterior.   KTape I strip application for retraction ribs 7 along rib and posterior rib at 25% tension.               ASSESSMENT                                                                                                                 Slight increase in PROM; better tolerance to exercises    Pain/symptoms after session: 6  Patient Education:   Results of above outlined education: Verbalizes understanding and Demonstrates understanding   PLAN                                                                                                                             Suggestions for next session as indicated: Progress per plan of care       Procedures and total treatment time documented Time Entry flowsheet.     RN, PT, SW

## 2024-04-05 NOTE — PROGRESS NOTE ADULT - ASSESSMENT
RACHEL SUMMERS is a 87 yo woman with h/o htn, DVT/PE in 2018 and in 2019, HFrEF s/p aicd and ckd V who p/w AMS and abdominal pain after being discharged from osh ([facility]) on 3/11 to complete macrobid abx course and now p/w ams and confusion with OMERO on CKD5    #Metabolic encephalopathy, improved  #OMERO on CKD stage V--2/2 dehydration and poor oral intake  #Resolved Cystitis  #Diarrhea---Enteritis  #HFrEF -- s/p Aicd  #h/o DVT/PE  - tele monitoring  - continue to monitor bmp closely  - started vit D  calcitriol 0.25 mcg po q24h   - hold nephrotoxic meds  - closely monitor vs-----can hold additional bp meds if becomes hypotensive (both entresto and lasix on hold---restart as cr and bp tolerates)  - lokelma 10g q24h for elevated potassium  - check stool study pending collection  - eps interrogated device   - f/u ua/ucr/ulytes -> wnl   - primafit to monitor and document I/o  - repeat UA neg  - repeat Blood cultures neg  - palliative care GOC conversation continuing with HCP -> HCP and patient want dialysis as per last palliative note  - nephrology following--- TDC placed by IR 4/1 and  RRT done 4/1 and 4/2  - pt needs outpatient HD arranged prior to dc     #suspected pneumonia  #perihilar opacities  - CXR showing perihilar opacities in setting of overnight fever and tachycardia  - s/p Levofloxacin 500mg q48h x 5 days     # HTN  -amLODIPine   Tablet 5 milliGRAM(s) Oral at bedtime  -isosorbide   mononitrate ER Tablet (IMDUR) 30 milliGRAM(s) Oral daily  -metoprolol succinate ER 50 milliGRAM(s) Oral daily    # Hx of dvt/pe  - on apixaban 2.5 po bid -> on hold for prospective TDC placement    # HLD  atorvastatin 80 milliGRAM(s) Oral at bedtime     # Depression   - strated mirtazapine 7.5 milliGRAM(s) Oral daily for mood and appetite stimulation  - sertraline 25 milliGRAM(s) Oral daily    #Diet: Pureed renal restricted   #DVT ppx: Eliquis on hold for prospective TDC procedure  #GI ppx: Not indicated  #Activity: IAT  #HCP: Leona Yaron - [phone]    Pending: dc planning, needs outpatient HD set up

## 2024-04-05 NOTE — PROGRESS NOTE ADULT - SUBJECTIVE AND OBJECTIVE BOX
24H events:    Patient is a 88y old Female who presents with a chief complaint of AMS (04 Apr 2024 17:11)    Primary diagnosis of OMERO (acute kidney injury)      Day 1:  Day 2:  Day 3:     Today is hospital day 14d. This morning patient was seen and examined at bedside, resting comfortably in bed.    No acute or major events overnight.    Patient eating well, denies nausea.     Code Status:    Family communication:  Contact date:  Name of person contacted:  Relationship to patient:  Communication details:  What matters most:    PAST MEDICAL & SURGICAL HISTORY  Chronic kidney disease    Hypertension    Gout    Diverticulitis  sigmoid    Rheumatoid arthritis    DVT, lower extremity  Bilateral    Pulmonary embolism    Chronic HFrEF (heart failure with reduced ejection fraction)    AICD (automatic cardioverter/defibrillator) present    Artificial pacemaker    History of appendectomy    History of tonsillectomy    History of hysterectomy    H/O hernia repair      SOCIAL HISTORY:  Social History:      ALLERGIES:  Keflex (Swelling)  cephalosporins (Angioedema)    MEDICATIONS:  STANDING MEDICATIONS  apixaban 2.5 milliGRAM(s) Oral every 12 hours  atorvastatin 80 milliGRAM(s) Oral at bedtime  bisacodyl Suppository 10 milliGRAM(s) Rectal at bedtime  calcitriol   Capsule 0.5 MICROGram(s) Oral two times a day  chlorhexidine 2% Cloths 1 Application(s) Topical daily  folic acid 1 milliGRAM(s) Oral daily  furosemide    Tablet 40 milliGRAM(s) Oral daily  isosorbide   mononitrate ER Tablet (IMDUR) 30 milliGRAM(s) Oral daily  metoprolol succinate ER 50 milliGRAM(s) Oral daily  montelukast 10 milliGRAM(s) Oral daily  sacubitril 24 mG/valsartan 26 mG 1 Tablet(s) Oral two times a day  sertraline 25 milliGRAM(s) Oral daily    PRN MEDICATIONS  acetaminophen     Tablet .. 650 milliGRAM(s) Oral every 6 hours PRN  albuterol    90 MICROgram(s) HFA Inhaler 2 Puff(s) Inhalation every 6 hours PRN  ondansetron Injectable 4 milliGRAM(s) IV Push every 6 hours PRN  simethicone 80 milliGRAM(s) Chew every 6 hours PRN  traMADol 25 milliGRAM(s) Oral every 8 hours PRN    VITALS:   T(F): 97.5  HR: 60  BP: 111/61  RR: 17  SpO2: 99%    PHYSICAL EXAM:  GENERAL:   ( x ) NAD, lying in bed comfortably     (  ) obtunded     (  ) lethargic     (  ) somnolent    HEAD:   ( x ) Atraumatic     (  ) hematoma     (  ) laceration (specify location:       )     NECK:  ( x ) Supple     (  ) neck stiffness     (  ) nuchal rigidity     (  )  no JVD     (  ) JVD present ( -- cm)    HEART:  Rate -->     ( x ) normal rate     (  ) bradycardic     (  ) tachycardic  Rhythm -->     ( x ) regular     (  ) regularly irregular     (  ) irregularly irregular  Murmurs -->     (  ) normal s1s2     (  ) systolic murmur     (  ) diastolic murmur     (  ) continuous murmur      (  ) S3 present     (  ) S4 present    LUNGS:   ( x )Unlabored respirations     (  ) tachypnea  ( x ) B/L air entry     (  ) decreased breath sounds in:  (location     )    (  ) no adventitious sound     (  ) crackles     (  ) wheezing      (  ) rhonchi      (specify location:       )  (  ) chest wall tenderness (specify location:       )    ABDOMEN:   ( x ) Soft     (  ) tense   |   ( x ) nondistended     (  ) distended   |   (  ) +BS     (  ) hypoactive bowel sounds     (  ) hyperactive bowel sounds  ( x ) nontender     (  ) RUQ tenderness     (  ) RLQ tenderness     (  ) LLQ tenderness     (  ) epigastric tenderness     (  ) diffuse tenderness  (  ) Splenomegaly      (  ) Hepatomegaly      (  ) Jaundice     (  ) ecchymosis     EXTREMITIES:  ( x ) Normal     (  ) Rash     (  ) ecchymosis     (  ) varicose veins      (  ) pitting edema     (  ) non-pitting edema   (  ) ulceration     (  ) gangrene:     (location:     )    NERVOUS SYSTEM:    (x  ) A&Ox3     (  ) confused     (  ) lethargic  CN II-XII:     (  ) Intact     (  ) deficits found     (Specify:     )   Upper extremities:     (  ) no sensorimotor deficits     (  ) weakness     (  ) loss of proprioception/vibration     (  ) loss of touch/temperature (specify:    )  Lower extremities:     (  ) no sensorimotor deficits     (  ) weakness     (  ) loss of proprioception/vibration     (  ) loss of touch/temperature (specify:    )    SKIN:   ( x ) No rashes or lesions     (  ) maculopapular rash     (  ) pustules     (  ) vesicles     (  ) ulcer     (  ) ecchymosis     (specify location:     )    AMPAC score:    (  ) Indwelling Morgan Catheter:   Date insterted:    Reason (  ) Critical illness     (  ) urinary retention    (  ) Accurate Ins/Outs Monitoring     (  ) CMO patient    (  ) Central Line:   Date inserted:  Location: (  ) Right IJ     (  ) Left IJ     (  ) Right Fem     (  ) Left Fem    (  ) SPC        (  ) pigtail       (  ) PEG tube       (  ) colostomy       (  ) jejunostomy  (  ) U-Dall    LABS:                        9.5    7.26  )-----------( 162      ( 05 Apr 2024 06:43 )             31.4     04-05    137  |  97<L>  |  22<H>  ----------------------------<  102<H>  3.7   |  26  |  2.7<H>    Ca    8.4      05 Apr 2024 06:43  Mg     1.9     04-05        Urinalysis Basic - ( 05 Apr 2024 06:43 )    Color: x / Appearance: x / SG: x / pH: x  Gluc: 102 mg/dL / Ketone: x  / Bili: x / Urobili: x   Blood: x / Protein: x / Nitrite: x   Leuk Esterase: x / RBC: x / WBC x   Sq Epi: x / Non Sq Epi: x / Bacteria: x                RADIOLOGY:

## 2024-04-05 NOTE — PROGRESS NOTE ADULT - TIME BILLING
direct patient care and chart review  -coordinated current plan of care with medical staff on MDR
direct patient care and chart review  -coordinated current plan of care with medical staff on MDR
direct pt care

## 2024-04-05 NOTE — DISCHARGE NOTE NURSING/CASE MANAGEMENT/SOCIAL WORK - NSDCFUADDAPPT_GEN_ALL_CORE_FT
confirmed 1st  HD Tuesday 4/9 at 14:30 PM, HD plan on [id number]. Pt  Continue HD at 13:30 T-Thur-Sat.

## 2024-04-05 NOTE — PROGRESS NOTE ADULT - ASSESSMENT
81 yo  female patient (Latter-day) with hx of CKD stage 5, HTN, DVT/PE 2018 (thought provoked by travel) completed 6m of AC then 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, NICM - HFrEF(LVEF 35-40% in 9/22) s/p AICD, RA, CKD stage 5. Currently on Macrobid for UTI (pt was discharge from [facility] 3/11 papers in her chart) presents to the ED for fluctuating mental status and abdominal pain with occasional CP.  Nephrology was consulted for OMERO over CKD stage 5.    # OMERO on CKD stage 5 - follows with Dr Urrutia  Likely ATN precipitated by pre-renal etiology  - S/p TDC, hd due tomorrow standard bath uf 1 liter as tolerated     # anemia of CKD  - sat 22% Ferritin 700, may receive VEnofer 200 mg IV x 2 doses    - patient is L[employer]-day, will not accept blood products  # HTN Controlled    Rehab consult   Will follow

## 2024-04-05 NOTE — DISCHARGE NOTE NURSING/CASE MANAGEMENT/SOCIAL WORK - PATIENT PORTAL LINK FT
You can access the FollowMyHealth Patient Portal offered by [facility] by registering at the following website: [email or web address]. By joining [facility]’s FollowMyHealth portal, you will also be able to view your health information using other applications (apps) compatible with our system.

## 2024-04-06 ENCOUNTER — INPATIENT (INPATIENT)
Facility: HOSPITAL | Age: 89
LOS: 8 days | Discharge: HOME HEALTH | DRG: 312 | End: 2024-04-15
Attending: INTERNAL MEDICINE | Admitting: INTERNAL MEDICINE
Payer: MEDICARE

## 2024-04-06 VITALS — DIASTOLIC BLOOD PRESSURE: 65 MMHG | HEART RATE: 78 BPM | SYSTOLIC BLOOD PRESSURE: 119 MMHG

## 2024-04-06 VITALS
HEIGHT: 63 IN | WEIGHT: 100.09 LBS | OXYGEN SATURATION: 100 % | RESPIRATION RATE: 13 BRPM | SYSTOLIC BLOOD PRESSURE: 114 MMHG | TEMPERATURE: 97 F | HEART RATE: 83 BPM | DIASTOLIC BLOOD PRESSURE: 69 MMHG

## 2024-04-06 DIAGNOSIS — Z98.890 OTHER SPECIFIED POSTPROCEDURAL STATES: Chronic | ICD-10-CM

## 2024-04-06 DIAGNOSIS — Z90.49 ACQUIRED ABSENCE OF OTHER SPECIFIED PARTS OF DIGESTIVE TRACT: Chronic | ICD-10-CM

## 2024-04-06 DIAGNOSIS — Z95.0 PRESENCE OF CARDIAC PACEMAKER: Chronic | ICD-10-CM

## 2024-04-06 DIAGNOSIS — Z90.89 ACQUIRED ABSENCE OF OTHER ORGANS: Chronic | ICD-10-CM

## 2024-04-06 DIAGNOSIS — Z90.710 ACQUIRED ABSENCE OF BOTH CERVIX AND UTERUS: Chronic | ICD-10-CM

## 2024-04-06 LAB
ALBUMIN SERPL ELPH-MCNC: 3.2 G/DL — LOW (ref 3.5–5.2)
ALP SERPL-CCNC: 135 U/L — HIGH (ref 30–115)
ALT FLD-CCNC: 21 U/L — SIGNIFICANT CHANGE UP (ref 0–41)
ANION GAP SERPL CALC-SCNC: 10 MMOL/L — SIGNIFICANT CHANGE UP (ref 7–14)
ANION GAP SERPL CALC-SCNC: 10 MMOL/L — SIGNIFICANT CHANGE UP (ref 7–14)
AST SERPL-CCNC: 52 U/L — HIGH (ref 0–41)
BASOPHILS # BLD AUTO: 0.03 K/UL — SIGNIFICANT CHANGE UP (ref 0–0.2)
BASOPHILS # BLD AUTO: 0.06 K/UL — SIGNIFICANT CHANGE UP (ref 0–0.2)
BASOPHILS NFR BLD AUTO: 0.4 % — SIGNIFICANT CHANGE UP (ref 0–1)
BASOPHILS NFR BLD AUTO: 0.9 % — SIGNIFICANT CHANGE UP (ref 0–1)
BILIRUB SERPL-MCNC: 0.3 MG/DL — SIGNIFICANT CHANGE UP (ref 0.2–1.2)
BUN SERPL-MCNC: 17 MG/DL — SIGNIFICANT CHANGE UP (ref 10–20)
BUN SERPL-MCNC: 30 MG/DL — HIGH (ref 10–20)
CALCIUM SERPL-MCNC: 7.9 MG/DL — LOW (ref 8.4–10.5)
CALCIUM SERPL-MCNC: 8.8 MG/DL — SIGNIFICANT CHANGE UP (ref 8.4–10.5)
CHLORIDE SERPL-SCNC: 101 MMOL/L — SIGNIFICANT CHANGE UP (ref 98–110)
CHLORIDE SERPL-SCNC: 95 MMOL/L — LOW (ref 98–110)
CO2 SERPL-SCNC: 27 MMOL/L — SIGNIFICANT CHANGE UP (ref 17–32)
CO2 SERPL-SCNC: 29 MMOL/L — SIGNIFICANT CHANGE UP (ref 17–32)
CREAT SERPL-MCNC: 2.6 MG/DL — HIGH (ref 0.7–1.5)
CREAT SERPL-MCNC: 3.4 MG/DL — HIGH (ref 0.7–1.5)
EGFR: 12 ML/MIN/1.73M2 — LOW
EGFR: 17 ML/MIN/1.73M2 — LOW
EOSINOPHIL # BLD AUTO: 0.42 K/UL — SIGNIFICANT CHANGE UP (ref 0–0.7)
EOSINOPHIL # BLD AUTO: 0.69 K/UL — SIGNIFICANT CHANGE UP (ref 0–0.7)
EOSINOPHIL NFR BLD AUTO: 6 % — SIGNIFICANT CHANGE UP (ref 0–8)
EOSINOPHIL NFR BLD AUTO: 9.7 % — HIGH (ref 0–8)
GLUCOSE SERPL-MCNC: 101 MG/DL — HIGH (ref 70–99)
GLUCOSE SERPL-MCNC: 127 MG/DL — HIGH (ref 70–99)
HCT VFR BLD CALC: 26.9 % — LOW (ref 37–47)
HCT VFR BLD CALC: 33.8 % — LOW (ref 37–47)
HGB BLD-MCNC: 10.3 G/DL — LOW (ref 12–16)
HGB BLD-MCNC: 8.4 G/DL — LOW (ref 12–16)
IMM GRANULOCYTES NFR BLD AUTO: 0.8 % — HIGH (ref 0.1–0.3)
IMM GRANULOCYTES NFR BLD AUTO: 1.1 % — HIGH (ref 0.1–0.3)
LYMPHOCYTES # BLD AUTO: 1.06 K/UL — LOW (ref 1.2–3.4)
LYMPHOCYTES # BLD AUTO: 1.88 K/UL — SIGNIFICANT CHANGE UP (ref 1.2–3.4)
LYMPHOCYTES # BLD AUTO: 15.2 % — LOW (ref 20.5–51.1)
LYMPHOCYTES # BLD AUTO: 26.6 % — SIGNIFICANT CHANGE UP (ref 20.5–51.1)
MAGNESIUM SERPL-MCNC: 2 MG/DL — SIGNIFICANT CHANGE UP (ref 1.8–2.4)
MAGNESIUM SERPL-MCNC: 2.1 MG/DL — SIGNIFICANT CHANGE UP (ref 1.8–2.4)
MCHC RBC-ENTMCNC: 28.3 PG — SIGNIFICANT CHANGE UP (ref 27–31)
MCHC RBC-ENTMCNC: 28.3 PG — SIGNIFICANT CHANGE UP (ref 27–31)
MCHC RBC-ENTMCNC: 30.5 G/DL — LOW (ref 32–37)
MCHC RBC-ENTMCNC: 31.2 G/DL — LOW (ref 32–37)
MCV RBC AUTO: 90.6 FL — SIGNIFICANT CHANGE UP (ref 81–99)
MCV RBC AUTO: 92.9 FL — SIGNIFICANT CHANGE UP (ref 81–99)
MONOCYTES # BLD AUTO: 0.81 K/UL — HIGH (ref 0.1–0.6)
MONOCYTES # BLD AUTO: 1.13 K/UL — HIGH (ref 0.1–0.6)
MONOCYTES NFR BLD AUTO: 11.6 % — HIGH (ref 1.7–9.3)
MONOCYTES NFR BLD AUTO: 16 % — HIGH (ref 1.7–9.3)
NEUTROPHILS # BLD AUTO: 3.29 K/UL — SIGNIFICANT CHANGE UP (ref 1.4–6.5)
NEUTROPHILS # BLD AUTO: 4.54 K/UL — SIGNIFICANT CHANGE UP (ref 1.4–6.5)
NEUTROPHILS NFR BLD AUTO: 46.5 % — SIGNIFICANT CHANGE UP (ref 42.2–75.2)
NEUTROPHILS NFR BLD AUTO: 65.2 % — SIGNIFICANT CHANGE UP (ref 42.2–75.2)
NRBC # BLD: 0 /100 WBCS — SIGNIFICANT CHANGE UP (ref 0–0)
NRBC # BLD: 0 /100 WBCS — SIGNIFICANT CHANGE UP (ref 0–0)
PLATELET # BLD AUTO: 155 K/UL — SIGNIFICANT CHANGE UP (ref 130–400)
PLATELET # BLD AUTO: 210 K/UL — SIGNIFICANT CHANGE UP (ref 130–400)
PMV BLD: 10.3 FL — SIGNIFICANT CHANGE UP (ref 7.4–10.4)
PMV BLD: 9.7 FL — SIGNIFICANT CHANGE UP (ref 7.4–10.4)
POTASSIUM SERPL-MCNC: 3.8 MMOL/L — SIGNIFICANT CHANGE UP (ref 3.5–5)
POTASSIUM SERPL-MCNC: 4.1 MMOL/L — SIGNIFICANT CHANGE UP (ref 3.5–5)
POTASSIUM SERPL-SCNC: 3.8 MMOL/L — SIGNIFICANT CHANGE UP (ref 3.5–5)
POTASSIUM SERPL-SCNC: 4.1 MMOL/L — SIGNIFICANT CHANGE UP (ref 3.5–5)
PROT SERPL-MCNC: 6 G/DL — SIGNIFICANT CHANGE UP (ref 6–8)
RBC # BLD: 2.97 M/UL — LOW (ref 4.2–5.4)
RBC # BLD: 3.64 M/UL — LOW (ref 4.2–5.4)
RBC # FLD: 13.1 % — SIGNIFICANT CHANGE UP (ref 11.5–14.5)
RBC # FLD: 13.2 % — SIGNIFICANT CHANGE UP (ref 11.5–14.5)
SODIUM SERPL-SCNC: 132 MMOL/L — LOW (ref 135–146)
SODIUM SERPL-SCNC: 140 MMOL/L — SIGNIFICANT CHANGE UP (ref 135–146)
TROPONIN T, HIGH SENSITIVITY RESULT: 96 NG/L — CRITICAL HIGH (ref 6–13)
WBC # BLD: 6.97 K/UL — SIGNIFICANT CHANGE UP (ref 4.8–10.8)
WBC # BLD: 7.08 K/UL — SIGNIFICANT CHANGE UP (ref 4.8–10.8)
WBC # FLD AUTO: 6.97 K/UL — SIGNIFICANT CHANGE UP (ref 4.8–10.8)
WBC # FLD AUTO: 7.08 K/UL — SIGNIFICANT CHANGE UP (ref 4.8–10.8)

## 2024-04-06 PROCEDURE — 71045 X-RAY EXAM CHEST 1 VIEW: CPT | Mod: 26

## 2024-04-06 PROCEDURE — 99239 HOSP IP/OBS DSCHRG MGMT >30: CPT

## 2024-04-06 PROCEDURE — 99285 EMERGENCY DEPT VISIT HI MDM: CPT | Mod: GC

## 2024-04-06 RX ORDER — CALCITRIOL 0.5 UG/1
1 CAPSULE ORAL
Qty: 0 | Refills: 0 | DISCHARGE
Start: 2024-04-06

## 2024-04-06 RX ORDER — IRON SUCROSE 20 MG/ML
200 INJECTION, SOLUTION INTRAVENOUS ONCE
Refills: 0 | Status: COMPLETED | OUTPATIENT
Start: 2024-04-06 | End: 2024-04-06

## 2024-04-06 RX ADMIN — MONTELUKAST 10 MILLIGRAM(S): 4 TABLET, CHEWABLE ORAL at 14:59

## 2024-04-06 RX ADMIN — CALCITRIOL 0.5 MICROGRAM(S): 0.5 CAPSULE ORAL at 17:55

## 2024-04-06 RX ADMIN — SACUBITRIL AND VALSARTAN 1 TABLET(S): 24; 26 TABLET, FILM COATED ORAL at 17:53

## 2024-04-06 RX ADMIN — APIXABAN 2.5 MILLIGRAM(S): 2.5 TABLET, FILM COATED ORAL at 05:04

## 2024-04-06 RX ADMIN — SERTRALINE 25 MILLIGRAM(S): 25 TABLET, FILM COATED ORAL at 14:59

## 2024-04-06 RX ADMIN — CALCITRIOL 0.5 MICROGRAM(S): 0.5 CAPSULE ORAL at 05:03

## 2024-04-06 RX ADMIN — Medication 1 MILLIGRAM(S): at 15:00

## 2024-04-06 RX ADMIN — SACUBITRIL AND VALSARTAN 1 TABLET(S): 24; 26 TABLET, FILM COATED ORAL at 05:04

## 2024-04-06 RX ADMIN — CHLORHEXIDINE GLUCONATE 1 APPLICATION(S): 213 SOLUTION TOPICAL at 14:58

## 2024-04-06 RX ADMIN — APIXABAN 2.5 MILLIGRAM(S): 2.5 TABLET, FILM COATED ORAL at 17:54

## 2024-04-06 RX ADMIN — Medication 50 MILLIGRAM(S): at 05:04

## 2024-04-06 RX ADMIN — ISOSORBIDE MONONITRATE 30 MILLIGRAM(S): 60 TABLET, EXTENDED RELEASE ORAL at 14:57

## 2024-04-06 RX ADMIN — Medication 40 MILLIGRAM(S): at 05:03

## 2024-04-06 RX ADMIN — IRON SUCROSE 110 MILLIGRAM(S): 20 INJECTION, SOLUTION INTRAVENOUS at 14:55

## 2024-04-06 NOTE — PROGRESS NOTE ADULT - SUBJECTIVE AND OBJECTIVE BOX
seen and examined  24 h events noted   no distress         PAST HISTORY  --------------------------------------------------------------------------------  No significant changes to PMH, PSH, FHx, SHx, unless otherwise noted    ALLERGIES & MEDICATIONS  --------------------------------------------------------------------------------  Allergies    Keflex (Swelling)  cephalosporins (Angioedema)    Intolerances      Standing Inpatient Medications  apixaban 2.5 milliGRAM(s) Oral every 12 hours  atorvastatin 80 milliGRAM(s) Oral at bedtime  bisacodyl Suppository 10 milliGRAM(s) Rectal at bedtime  calcitriol   Capsule 0.5 MICROGram(s) Oral two times a day  chlorhexidine 2% Cloths 1 Application(s) Topical daily  folic acid 1 milliGRAM(s) Oral daily  furosemide    Tablet 40 milliGRAM(s) Oral daily  isosorbide   mononitrate ER Tablet (IMDUR) 30 milliGRAM(s) Oral daily  metoprolol succinate ER 50 milliGRAM(s) Oral daily  montelukast 10 milliGRAM(s) Oral daily  sacubitril 24 mG/valsartan 26 mG 1 Tablet(s) Oral two times a day  sertraline 25 milliGRAM(s) Oral daily    PRN Inpatient Medications  acetaminophen     Tablet .. 650 milliGRAM(s) Oral every 6 hours PRN  albuterol    90 MICROgram(s) HFA Inhaler 2 Puff(s) Inhalation every 6 hours PRN  ondansetron Injectable 4 milliGRAM(s) IV Push every 6 hours PRN  simethicone 80 milliGRAM(s) Chew every 6 hours PRN  traMADol 25 milliGRAM(s) Oral every 8 hours PRN        VITALS/PHYSICAL EXAM  --------------------------------------------------------------------------------  T(C): 36.6 (04-06-24 @ 04:43), Max: 37.2 (04-05-24 @ 20:03)  HR: 72 (04-06-24 @ 04:43) (72 - 82)  BP: 130/64 (04-06-24 @ 04:43) (121/64 - 136/64)  RR: 19 (04-06-24 @ 04:43) (18 - 19)  SpO2: 97% (04-06-24 @ 04:43) (97% - 98%)  Wt(kg): --        Physical Exam:  	Gen: NAD  	Pulm: CTA B/L  	CV: S1S2; no rub  	Abd:distended  	LE: no edema  	Vascular access:tdc    LABS/STUDIES  --------------------------------------------------------------------------------              8.4    7.08  >-----------<  155      [04-06-24 @ 07:15]              26.9     137  |  97  |  22  ----------------------------<  102      [04-05-24 @ 06:43]  3.7   |  26  |  2.7        Ca     8.4     [04-05-24 @ 06:43]      Mg     1.9     [04-05-24 @ 06:43]        Creatinine Trend:  SCr 2.7 [04-05 @ 06:43]  SCr 3.9 [04-04 @ 06:44]  SCr 3.2 [04-03 @ 08:13]  SCr 4.1 [04-02 @ 06:42]  SCr 6.2 [04-01 @ 06:46]    Urinalysis - [04-05-24 @ 06:43]      Color  / Appearance  / SG  / pH       Gluc 102 / Ketone   / Bili  / Urobili        Blood  / Protein  / Leuk Est  / Nitrite       RBC  / WBC  / Hyaline  / Gran  / Sq Epi  / Non Sq Epi  / Bacteria       Iron 33, TIBC 147, %sat 22      [04-04-24 @ 06:44]  Ferritin 785      [04-04-24 @ 06:44]  PTH -- (Ca 8.2)      [03-24-24 @ 20:00]   138  Vitamin D (25OH) 9      [03-24-24 @ 20:00]  Lipid: chol 144, , HDL 39, LDL --      [03-22-24 @ 06:14]    HBsAb Nonreact      [03-27-24 @ 15:55]  HBsAg Nonreact      [04-01-24 @ 15:52]  HBcAb Nonreact      [04-03-24 @ 16:02]  HCV 0.20, Nonreact      [04-01-24 @ 15:52]

## 2024-04-06 NOTE — ED ADULT TRIAGE NOTE - CCCP TRG CHIEF CMPLNT
Implemented All Universal Safety Interventions:  [place] to call system. Call bell, personal items and telephone within reach. Instruct patient to call for assistance. Room bathroom lighting operational. Non-slip footwear when patient is off stretcher. Physically safe environment: no spills, clutter or unnecessary equipment. Stretcher in lowest position, wheels locked, appropriate side rails in place.
see chief complaint quote

## 2024-04-06 NOTE — PROGRESS NOTE ADULT - ASSESSMENT
RACHEL MARRUFO is a 89 yo woman with h/o htn, DVT/PE in 2018 and in 2019, HFrEF s/p aicd and ckd V who p/w AMS and abdominal pain after being discharged from osh ([facility]) on 3/11 to complete macrobid abx course and now p/w ams and confusion with OMERO on CKD5    #Metabolic encephalopathy, improved  #OMERO on CKD stage V--2/2 dehydration and poor oral intake  #Resolved Cystitis  #Diarrhea---Enteritis  #HFrEF -- s/p Aicd  #h/o DVT/PE  - tele monitoring  - continue to monitor bmp closely  - started vit D  calcitriol 0.25 mcg po q24h   - hold nephrotoxic meds  - closely monitor vs-----can hold additional bp meds if becomes hypotensive (both entresto and lasix on hold---restart as cr and bp tolerates)  - lokelma 10g q24h for elevated potassium  - check stool study pending collection  - eps interrogated device   - f/u ua/ucr/ulytes -> wnl   - primafit to monitor and document I/o  - repeat UA neg  - repeat Blood cultures neg  - palliative care GOC conversation continuing with HCP -> HCP and patient want dialysis as per last palliative note  - nephrology following--- TDC placed by IR 4/1 and  RRT done 4/1 and 4/2  - pt needs outpatient HD arranged prior to dc     #suspected pneumonia  #perihilar opacities  - CXR showing perihilar opacities in setting of overnight fever and tachycardia  - s/p Levofloxacin 500mg q48h x 5 days     # HTN  -amLODIPine   Tablet 5 milliGRAM(s) Oral at bedtime  -isosorbide   mononitrate ER Tablet (IMDUR) 30 milliGRAM(s) Oral daily  -metoprolol succinate ER 50 milliGRAM(s) Oral daily    # Hx of dvt/pe  - on apixaban 2.5 po bid -> on hold for prospective TDC placement    # HLD  atorvastatin 80 milliGRAM(s) Oral at bedtime     # Depression   - strated mirtazapine 7.5 milliGRAM(s) Oral daily for mood and appetite stimulation  - sertraline 25 milliGRAM(s) Oral daily    #Diet: Pureed renal restricted   #DVT ppx: Eliquis  #GI ppx: Not indicated  #Activity: IAT  #HCP: Leona Marrufo - [phone]    Pending: dc after HD today

## 2024-04-06 NOTE — PROGRESS NOTE ADULT - ASSESSMENT
81 yo  female patient (Jewish) with hx of CKD stage 5, HTN, DVT/PE 2018 (thought provoked by travel) completed 6m of AC then 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, NICM - HFrEF(LVEF 35-40% in 9/22) s/p AICD, RA, CKD stage 5, presents to the ED for fluctuating mental status and abdominal pain with occasional CP.  Nephrology was consulted for OMERO over CKD stage 5.  # OMERO on CKD stage 5 - follows with Dr Urrutia  Likely ATN precipitated by pre-renal etiology  - S/p TDC, hd due today  standard bath uf 1 liter as tolerated   # anemia of CKD  - sat 22% Ferritin 700, may receive VEnofer 200 mg IV x 2 doses    - patient is Jewish, will not accept blood products  # HTN Controlled  continue lasix if non oliguric   check ph /pth   will need outpatient dialysis spot at [phone]   Will follow

## 2024-04-06 NOTE — PROGRESS NOTE ADULT - PROVIDER SPECIALTY LIST ADULT
Hospitalist
Hospitalist
Internal Medicine
Internal Medicine
Nephrology
Hospitalist
Hospitalist
Internal Medicine
Nephrology
Hospitalist
Internal Medicine
Internal Medicine
Nephrology
Hospitalist
Internal Medicine
Nephrology
Hospitalist
Internal Medicine
Internal Medicine
Palliative Care
Palliative Care

## 2024-04-06 NOTE — PROGRESS NOTE ADULT - SUBJECTIVE AND OBJECTIVE BOX
RACHEL SUMMERS 88y Female  MRN#: [id number]   Hospital Day: 15d    SUBJECTIVE  Patient is a 88y old Female who presents with a chief complaint of AMS (06 Apr 2024 08:37)  Currently admitted to medicine with the primary diagnosis of OMERO (acute kidney injury)      INTERVAL HPI AND OVERNIGHT EVENTS:  Patient was examined and seen at bedside. This morning she is resting comfortably in bed and reports no issues or overnight events.    OBJECTIVE  PAST MEDICAL & SURGICAL HISTORY  Chronic kidney disease    Hypertension    Gout    Diverticulitis  sigmoid    Rheumatoid arthritis    DVT, lower extremity  Bilateral    Pulmonary embolism    Chronic HFrEF (heart failure with reduced ejection fraction)    AICD (automatic cardioverter/defibrillator) present    Artificial pacemaker    History of appendectomy    History of tonsillectomy    History of hysterectomy    H/O hernia repair      ALLERGIES:  Keflex (Swelling)  cephalosporins (Angioedema)    MEDICATIONS:  STANDING MEDICATIONS  apixaban 2.5 milliGRAM(s) Oral every 12 hours  atorvastatin 80 milliGRAM(s) Oral at bedtime  bisacodyl Suppository 10 milliGRAM(s) Rectal at bedtime  calcitriol   Capsule 0.5 MICROGram(s) Oral two times a day  chlorhexidine 2% Cloths 1 Application(s) Topical daily  folic acid 1 milliGRAM(s) Oral daily  furosemide    Tablet 40 milliGRAM(s) Oral daily  iron sucrose IVPB 200 milliGRAM(s) IV Intermittent once  isosorbide   mononitrate ER Tablet (IMDUR) 30 milliGRAM(s) Oral daily  metoprolol succinate ER 50 milliGRAM(s) Oral daily  montelukast 10 milliGRAM(s) Oral daily  sacubitril 24 mG/valsartan 26 mG 1 Tablet(s) Oral two times a day  sertraline 25 milliGRAM(s) Oral daily    PRN MEDICATIONS  acetaminophen     Tablet .. 650 milliGRAM(s) Oral every 6 hours PRN  albuterol    90 MICROgram(s) HFA Inhaler 2 Puff(s) Inhalation every 6 hours PRN  ondansetron Injectable 4 milliGRAM(s) IV Push every 6 hours PRN  simethicone 80 milliGRAM(s) Chew every 6 hours PRN  traMADol 25 milliGRAM(s) Oral every 8 hours PRN      VITAL SIGNS: Last 24 Hours  T(C): 36.6 (06 Apr 2024 04:43), Max: 37.2 (05 Apr 2024 20:03)  T(F): 97.8 (06 Apr 2024 04:43), Max: 98.9 (05 Apr 2024 20:03)  HR: 72 (06 Apr 2024 04:43) (72 - 82)  BP: 130/64 (06 Apr 2024 04:43) (121/64 - 136/64)  BP(mean): 90 (06 Apr 2024 04:43) (85 - 90)  RR: 19 (06 Apr 2024 04:43) (18 - 19)  SpO2: 97% (06 Apr 2024 04:43) (97% - 98%)    LABS:                        8.4    7.08  )-----------( 155      ( 06 Apr 2024 07:15 )             26.9     04-06    132<L>  |  95<L>  |  30<H>  ----------------------------<  101<H>  3.8   |  27  |  3.4<H>    Ca    7.9<L>      06 Apr 2024 07:15  Mg     2.0     04-06        Urinalysis Basic - ( 06 Apr 2024 07:15 )    Color: x / Appearance: x / SG: x / pH: x  Gluc: 101 mg/dL / Ketone: x  / Bili: x / Urobili: x   Blood: x / Protein: x / Nitrite: x   Leuk Esterase: x / RBC: x / WBC x   Sq Epi: x / Non Sq Epi: x / Bacteria: x                RADIOLOGY:      PHYSICAL EXAM:  CONSTITUTIONAL: asleep in bed, hat on, No acute distress, AAOx3  HEAD: Atraumatic, normocephalic  EYES: EOM intact, PERRLA, conjunctiva and sclera clear  ENT: moist mucous membranes  PULMONARY: Clear to auscultation bilaterally  CARDIOVASCULAR: Regular rate and rhythm  GASTROINTESTINAL: Soft, non-tender, non-distended; bowel sounds present  MUSCULOSKELETAL: no edema  NEUROLOGY: non-focal  SKIN: warm and dry

## 2024-04-06 NOTE — ED ADULT TRIAGE NOTE - CHIEF COMPLAINT QUOTE
Pt discharged this morning with OMERO, R port cath placed for dialysis, had dialysis this afternoon. As per daughter on way home patient became nauseous, and then "slumped over" patient has multiple visists for AMS.  patient cleared by crit and placed in main.

## 2024-04-07 DIAGNOSIS — R55 SYNCOPE AND COLLAPSE: ICD-10-CM

## 2024-04-07 PROBLEM — Z95.810 PRESENCE OF AUTOMATIC (IMPLANTABLE) CARDIAC DEFIBRILLATOR: Chronic | Status: ACTIVE | Noted: 2024-03-22

## 2024-04-07 PROBLEM — I50.22 CHRONIC SYSTOLIC (CONGESTIVE) HEART FAILURE: Chronic | Status: ACTIVE | Noted: 2024-03-22

## 2024-04-07 PROBLEM — I26.99 OTHER PULMONARY EMBOLISM WITHOUT ACUTE COR PULMONALE: Chronic | Status: ACTIVE | Noted: 2024-03-22

## 2024-04-07 LAB
ALBUMIN SERPL ELPH-MCNC: 2.8 G/DL — LOW (ref 3.5–5.2)
ALP SERPL-CCNC: 122 U/L — HIGH (ref 30–115)
ALT FLD-CCNC: 19 U/L — SIGNIFICANT CHANGE UP (ref 0–41)
ANION GAP SERPL CALC-SCNC: 11 MMOL/L — SIGNIFICANT CHANGE UP (ref 7–14)
AST SERPL-CCNC: 39 U/L — SIGNIFICANT CHANGE UP (ref 0–41)
BILIRUB SERPL-MCNC: 0.2 MG/DL — SIGNIFICANT CHANGE UP (ref 0.2–1.2)
BUN SERPL-MCNC: 23 MG/DL — HIGH (ref 10–20)
CALCIUM SERPL-MCNC: 8.3 MG/DL — LOW (ref 8.4–10.4)
CHLORIDE SERPL-SCNC: 97 MMOL/L — LOW (ref 98–110)
CO2 SERPL-SCNC: 24 MMOL/L — SIGNIFICANT CHANGE UP (ref 17–32)
CREAT SERPL-MCNC: 3.4 MG/DL — HIGH (ref 0.7–1.5)
EGFR: 12 ML/MIN/1.73M2 — LOW
GLUCOSE SERPL-MCNC: 142 MG/DL — HIGH (ref 70–99)
HCT VFR BLD CALC: 26.9 % — LOW (ref 37–47)
HGB BLD-MCNC: 8.4 G/DL — LOW (ref 12–16)
MAGNESIUM SERPL-MCNC: 2.2 MG/DL — SIGNIFICANT CHANGE UP (ref 1.8–2.4)
MCHC RBC-ENTMCNC: 28.7 PG — SIGNIFICANT CHANGE UP (ref 27–31)
MCHC RBC-ENTMCNC: 31.2 G/DL — LOW (ref 32–37)
MCV RBC AUTO: 91.8 FL — SIGNIFICANT CHANGE UP (ref 81–99)
NRBC # BLD: 0 /100 WBCS — SIGNIFICANT CHANGE UP (ref 0–0)
PHOSPHATE SERPL-MCNC: 2.3 MG/DL — SIGNIFICANT CHANGE UP (ref 2.1–4.9)
PLATELET # BLD AUTO: 210 K/UL — SIGNIFICANT CHANGE UP (ref 130–400)
PMV BLD: 10.3 FL — SIGNIFICANT CHANGE UP (ref 7.4–10.4)
POTASSIUM SERPL-MCNC: 4.3 MMOL/L — SIGNIFICANT CHANGE UP (ref 3.5–5)
POTASSIUM SERPL-SCNC: 4.3 MMOL/L — SIGNIFICANT CHANGE UP (ref 3.5–5)
PROT SERPL-MCNC: 5.3 G/DL — LOW (ref 6–8)
RBC # BLD: 2.93 M/UL — LOW (ref 4.2–5.4)
RBC # FLD: 13.4 % — SIGNIFICANT CHANGE UP (ref 11.5–14.5)
SODIUM SERPL-SCNC: 132 MMOL/L — LOW (ref 135–146)
TROPONIN T, HIGH SENSITIVITY RESULT: 90 NG/L — CRITICAL HIGH (ref 6–13)
WBC # BLD: 6.51 K/UL — SIGNIFICANT CHANGE UP (ref 4.8–10.8)
WBC # FLD AUTO: 6.51 K/UL — SIGNIFICANT CHANGE UP (ref 4.8–10.8)

## 2024-04-07 PROCEDURE — 82652 VIT D 1 25-DIHYDROXY: CPT

## 2024-04-07 PROCEDURE — 82306 VITAMIN D 25 HYDROXY: CPT

## 2024-04-07 PROCEDURE — 84100 ASSAY OF PHOSPHORUS: CPT

## 2024-04-07 PROCEDURE — 97110 THERAPEUTIC EXERCISES: CPT | Mod: GP

## 2024-04-07 PROCEDURE — 85027 COMPLETE CBC AUTOMATED: CPT

## 2024-04-07 PROCEDURE — 70498 CT ANGIOGRAPHY NECK: CPT | Mod: 26,MC

## 2024-04-07 PROCEDURE — 92610 EVALUATE SWALLOWING FUNCTION: CPT | Mod: GN

## 2024-04-07 PROCEDURE — 0225U NFCT DS DNA&RNA 21 SARSCOV2: CPT

## 2024-04-07 PROCEDURE — 83735 ASSAY OF MAGNESIUM: CPT

## 2024-04-07 PROCEDURE — 71045 X-RAY EXAM CHEST 1 VIEW: CPT

## 2024-04-07 PROCEDURE — 71045 X-RAY EXAM CHEST 1 VIEW: CPT | Mod: 26

## 2024-04-07 PROCEDURE — 93005 ELECTROCARDIOGRAM TRACING: CPT

## 2024-04-07 PROCEDURE — 86900 BLOOD TYPING SEROLOGIC ABO: CPT

## 2024-04-07 PROCEDURE — 70496 CT ANGIOGRAPHY HEAD: CPT | Mod: 26,MC

## 2024-04-07 PROCEDURE — 85025 COMPLETE CBC W/AUTO DIFF WBC: CPT

## 2024-04-07 PROCEDURE — 70450 CT HEAD/BRAIN W/O DYE: CPT | Mod: 26,MC,59

## 2024-04-07 PROCEDURE — 86850 RBC ANTIBODY SCREEN: CPT

## 2024-04-07 PROCEDURE — 82607 VITAMIN B-12: CPT

## 2024-04-07 PROCEDURE — 82310 ASSAY OF CALCIUM: CPT

## 2024-04-07 PROCEDURE — 87507 IADNA-DNA/RNA PROBE TQ 12-25: CPT

## 2024-04-07 PROCEDURE — 83970 ASSAY OF PARATHORMONE: CPT

## 2024-04-07 PROCEDURE — 93284 PRGRMG EVAL IMPLANTABLE DFB: CPT

## 2024-04-07 PROCEDURE — 82746 ASSAY OF FOLIC ACID SERUM: CPT

## 2024-04-07 PROCEDURE — 80053 COMPREHEN METABOLIC PANEL: CPT

## 2024-04-07 PROCEDURE — 74177 CT ABD & PELVIS W/CONTRAST: CPT | Mod: 26,MC

## 2024-04-07 PROCEDURE — 93010 ELECTROCARDIOGRAM REPORT: CPT

## 2024-04-07 PROCEDURE — 36415 COLL VENOUS BLD VENIPUNCTURE: CPT

## 2024-04-07 PROCEDURE — 80048 BASIC METABOLIC PNL TOTAL CA: CPT

## 2024-04-07 PROCEDURE — 97162 PT EVAL MOD COMPLEX 30 MIN: CPT | Mod: GP

## 2024-04-07 PROCEDURE — 93307 TTE W/O DOPPLER COMPLETE: CPT

## 2024-04-07 PROCEDURE — 90935 HEMODIALYSIS ONE EVALUATION: CPT

## 2024-04-07 PROCEDURE — 99222 1ST HOSP IP/OBS MODERATE 55: CPT

## 2024-04-07 PROCEDURE — 86901 BLOOD TYPING SEROLOGIC RH(D): CPT

## 2024-04-07 RX ORDER — PANTOPRAZOLE SODIUM 20 MG/1
40 TABLET, DELAYED RELEASE ORAL DAILY
Refills: 0 | Status: DISCONTINUED | OUTPATIENT
Start: 2024-04-07 | End: 2024-04-11

## 2024-04-07 RX ORDER — ATORVASTATIN CALCIUM 80 MG/1
1 TABLET, FILM COATED ORAL
Refills: 0 | DISCHARGE

## 2024-04-07 RX ORDER — ACETAMINOPHEN 500 MG
650 TABLET ORAL EVERY 6 HOURS
Refills: 0 | Status: DISCONTINUED | OUTPATIENT
Start: 2024-04-07 | End: 2024-04-15

## 2024-04-07 RX ORDER — SERTRALINE 25 MG/1
25 TABLET, FILM COATED ORAL DAILY
Refills: 0 | Status: DISCONTINUED | OUTPATIENT
Start: 2024-04-07 | End: 2024-04-15

## 2024-04-07 RX ORDER — FOLIC ACID 0.8 MG
1 TABLET ORAL DAILY
Refills: 0 | Status: DISCONTINUED | OUTPATIENT
Start: 2024-04-07 | End: 2024-04-15

## 2024-04-07 RX ORDER — FOLIC ACID 0.8 MG
1 TABLET ORAL
Refills: 0 | DISCHARGE

## 2024-04-07 RX ORDER — TIMOLOL 0.5 %
1 DROPS OPHTHALMIC (EYE)
Qty: 0 | Refills: 0 | DISCHARGE

## 2024-04-07 RX ORDER — ALBUTEROL 90 UG/1
2 AEROSOL, METERED ORAL
Refills: 0 | DISCHARGE

## 2024-04-07 RX ORDER — ALBUTEROL 90 UG/1
2 AEROSOL, METERED ORAL EVERY 6 HOURS
Refills: 0 | Status: DISCONTINUED | OUTPATIENT
Start: 2024-04-07 | End: 2024-04-15

## 2024-04-07 RX ORDER — LATANOPROST 0.05 MG/ML
1 SOLUTION/ DROPS OPHTHALMIC; TOPICAL
Refills: 0 | DISCHARGE

## 2024-04-07 RX ORDER — CALCITRIOL 0.5 UG/1
0.5 CAPSULE ORAL EVERY 12 HOURS
Refills: 0 | Status: DISCONTINUED | OUTPATIENT
Start: 2024-04-07 | End: 2024-04-15

## 2024-04-07 RX ORDER — FERROUS SULFATE 325(65) MG
325 TABLET ORAL
Refills: 0 | Status: DISCONTINUED | OUTPATIENT
Start: 2024-04-07 | End: 2024-04-14

## 2024-04-07 RX ORDER — SERTRALINE 25 MG/1
1 TABLET, FILM COATED ORAL
Refills: 0 | DISCHARGE

## 2024-04-07 RX ORDER — ATORVASTATIN CALCIUM 80 MG/1
80 TABLET, FILM COATED ORAL AT BEDTIME
Refills: 0 | Status: DISCONTINUED | OUTPATIENT
Start: 2024-04-07 | End: 2024-04-15

## 2024-04-07 RX ORDER — APIXABAN 2.5 MG/1
2.5 TABLET, FILM COATED ORAL EVERY 12 HOURS
Refills: 0 | Status: DISCONTINUED | OUTPATIENT
Start: 2024-04-07 | End: 2024-04-15

## 2024-04-07 RX ORDER — OLOPATADINE HYDROCHLORIDE 1 MG/ML
1 SOLUTION/ DROPS OPHTHALMIC
Refills: 0 | DISCHARGE

## 2024-04-07 RX ORDER — METOPROLOL TARTRATE 50 MG
1 TABLET ORAL
Refills: 0 | DISCHARGE

## 2024-04-07 RX ORDER — METOPROLOL TARTRATE 50 MG
50 TABLET ORAL DAILY
Refills: 0 | Status: DISCONTINUED | OUTPATIENT
Start: 2024-04-07 | End: 2024-04-15

## 2024-04-07 RX ADMIN — Medication 650 MILLIGRAM(S): at 23:48

## 2024-04-07 RX ADMIN — APIXABAN 2.5 MILLIGRAM(S): 2.5 TABLET, FILM COATED ORAL at 17:25

## 2024-04-07 RX ADMIN — ATORVASTATIN CALCIUM 80 MILLIGRAM(S): 80 TABLET, FILM COATED ORAL at 22:17

## 2024-04-07 NOTE — ED PROVIDER NOTE - BIRTH SEX
Medication:   Requested Prescriptions     Pending Prescriptions Disp Refills    methylphenidate (RITALIN) 20 MG tablet 60 tablet 0     Sig: Take 1 tablet by mouth 2 times daily for 30 days. Last Filled:  7/31/21    Patient Phone Number: [phone] (home)     Last appt: 12/3/2020   Next appt: 12/9/2021    Last OARRS: No flowsheet data found.   PDMP Monitoring:    Last PDMP Elizabeth Cabrera as Reviewed [facility]):  Review User Review Instant Review Result   Aravind HUNTLEY 7/29/2021  8:11 AM Reviewed PDMP [1]     Preferred Pharmacy:   [address], [phone] [id number]  Phone: [phone] Fax: [phone] Female

## 2024-04-07 NOTE — ED ADULT NURSE REASSESSMENT NOTE - NS ED NURSE REASSESS COMMENT FT1
Pt received from outgoing shift at 1130. Pt is calmly resting in bed at this time. Pt is alert and oriented x3. No s/s of respiratory distress. Pt is able to make all needs known. No further complaints at this time. Pt is admitted to Telemetry service. Pending bed status. Safety and comfort measures in place. Fall precautions in place as per hospital policy. Will continue to monitor and assess for changes.  PIV 20G LAC flushed for patency, patency verified.

## 2024-04-07 NOTE — ED PROVIDER NOTE - PHYSICAL EXAMINATION
CONST: chronically ill appearing  HEAD:  normocephalic, atraumatic  EYES:  conjunctivae without injection, drainage or discharge  ENMT: nasal mucosa moist; mouth moist without ulcerations or lesions; throat moist without erythema, exudate, ulcerations or lesions  NECK:  supple  CARDIAC:  regular rate and rhythm, normal S1 and S2, no murmurs, rubs or gallops  RESP:  respiratory rate and effort appear normal for age; lungs are clear to auscultation bilaterally; no rales or wheezes  ABDOMEN:  soft, nontender, nondistended  MUSCULOSKELETAL/NEURO:  awake and alert, normal movement, normal tone  SKIN:  normal skin color for age and race, well-perfused; warm and dry

## 2024-04-07 NOTE — ED ADULT NURSE REASSESSMENT NOTE - NSFALLRISKINTERV_ED_ALL_ED
Assistance OOB with selected safe patient handling equipment if applicable/Assistance with ambulation/Communicate fall risk and risk factors to all staff, patient, and family/Monitor gait and stability/Provide visual cue: yellow wristband, yellow gown, etc/Reinforce activity limits and safety measures with patient and family/Call bell, personal items and telephone in reach/Instruct patient to call for assistance before getting out of bed/chair/stretcher/Non-slip footwear applied when patient is off stretcher/[place] to call system/Physically safe environment - no spills, clutter or unnecessary equipment/Purposeful Proactive Rounding/Room/bathroom lighting operational, light cord in reach

## 2024-04-07 NOTE — H&P ADULT - ASSESSMENT
#MISC  - DVT PPx:  - GI PPx:  - Diet: regular dash fluid restriction  - Activity: iat  - Labs: ordered  - Code: DNR DNI ([facility] 3/28) reconfirmed today  - Dispo: tele    - Medrec: confirmed 87yo  female patient (Orthodox) with hx of HTN, DVT/PE 2018 (thought provoked by travel) completed 6m of AC then 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  --> presented today for syncope. As per daughter, patient was discharged yesterday in the car she felt nauseous, when they got home she felt lightheaded became lethargic closed her eyes and wouldn't respond to the daughter for a few seconds then she regained consciousness. patient was feeling dizzy as well. but no pain, seizure like activity, or any other signs or sx.   patient admitted for workup and management of syncope.       #Syncope - possible 2/2 dehydration vs BP induced, r/o orthostatic or cardiac etiology, unlikely neurologic  ed vitals normal. hb 10.3 at baseline, trop 96-->91, bun/crea 17/2.6 ( patient on HD)  cxr and ekg not done yet  cth: No acute intracranial pathology. Stable exam.  ct angio neck with iv ct: Focal irregular moderate stenosis at the distal V2/proximal V3 segments of the right vertebral artery.   Focal moderate stenosis of the proximal intracranial segment of the right vertebral artery.  ct abd pelv with iv ct: No evidence of acute abdominopelvic pathology.  --> orthostatic   - TTE ( heard a murmur)   - EKG   - EP for ICD interrogation (medtronic)  - r/o infx source: f.u cxr, UA , RVP  - PT   - monitor tele    #CKD stage 5 newly started on HD  - follows with Dr Urrutia  - non oliguric  - S/p TDC  - daily phos  - outpatient dialysis spot at [address]   - nephro consult    # anemia of CKD  - sat 22% Ferritin 700, may receive VEnofer 200 mg IV x 2 doses    - patient is Orthodox, will not accept blood products      #NICM - HFrEF(LVEF 35-40% in 9/22)   #s/p AICD MEdtronic  #HTN  cardiologist: Dr Mckinney  TTE at [facility] 3/2024: EF 45-50%, GIIDD  cw home : cw home amlodipine, toprol,   hold home imdur, entresto and lasix until orthostatic hypotension is ruled out    # DVT/PE 2018 (thought provoked by travel) completed 6m of AC then 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  cw home eliquis 2.5 mg bid    #RA: not on home meds      #MISC  - DVT PPx: eliquis 2.5 mg bid  - GI PPx: protonix 40mg po qd   - Diet: regular dash fluid restriction  - Activity: iat  - Labs: ordered  - Code: DNR DNI ([facility] 3/28) reconfirmed today  - Dispo: tele    - Medrec: confirmed 89yo  female patient (Mor[employer]) with hx of HTN, DVT/PE 2018 (thought provoked by travel) completed 6m of AC then 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  --> presented today for syncope. As per daughter, patient was discharged yesterday in the car she felt nauseous, when they got home she felt lightheaded became lethargic closed her eyes and wouldn't respond to the daughter for a few seconds then she regained consciousness. patient was feeling dizzy as well. but no pain, seizure like activity, or any other signs or sx.   patient admitted for workup and management of syncope.       #Syncope - possible 2/2 dehydration vs BP induced, r/o orthostatic or cardiac etiology, unlikely neurologic  ed vitals normal. hb 10.3 at baseline, trop 96-->91, bun/crea 17/2.6 ( patient on HD)  cxr and ekg not done yet  cth: No acute intracranial pathology. Stable exam.  ct angio neck with iv ct: Focal irregular moderate stenosis at the distal V2/proximal V3 segments of the right vertebral artery.   Focal moderate stenosis of the proximal intracranial segment of the right vertebral artery.  ct abd pelv with iv ct: No evidence of acute abdominopelvic pathology.  --> orthostatic   - neuroendovascular consult and neurochecks  - TTE ( heard a murmur)   - EKG   - EP for ICD interrogation (medtronic)  - r/o infx source: f.u cxr, UA , RVP  - PT   - monitor tele    #CKD stage 5 newly started on HD  - follows with Dr Urrutia  - non oliguric  - S/p TDC  - daily phos  - outpatient dialysis spot [address]   - nephro consult    # anemia of CKD  - sat 22% Ferritin 700, may receive VEnofer 200 mg IV x 2 doses    - patient is Moravian, will not accept blood products      #NICM - HFrEF(LVEF 35-40% in 9/22)   #s/p AICD MEdtronic  #HTN  cardiologist: Dr Mckinney  TTE at [facility] 3/2024: EF 45-50%, GIIDD  cw home : cw home amlodipine, toprol,   hold home imdur, entresto and lasix until orthostatic hypotension is ruled out    # DVT/PE 2018 (thought provoked by travel) completed 6m of AC then 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  cw home eliquis 2.5 mg bid    #RA: not on home meds      #MISC  - DVT PPx: eliquis 2.5 mg bid  - GI PPx: protonix 40mg po qd   - Diet: regular dash fluid restriction  - Activity: iat  - Labs: ordered  - Code: DNR DNI with NIV trial ([facility] 3/28) reconfirmed today 4/7/2024   - Dispo: tele    - Medrec: confirmed

## 2024-04-07 NOTE — H&P ADULT - NSHPPHYSICALEXAM_GEN_ALL_CORE
GENERAL: NAD, well-developed.  HEAD:  Atraumatic, Normocephalic.  EYES: conjunctiva and sclera clear  CHEST/LUNG: GBAE. No wheezing or crackles   HEART: regular rate and rhythm; S1/S2.  ABDOMEN: Soft, Nontender, Nondistended  EXTREMITIES: No edema.   PSYCH: AAOx3.  NEUROLOGY: non-focal; moves all extremities GENERAL: well-developed. . lethargic   HEAD:  Atraumatic, Normocephalic.  EYES: conjunctiva and sclera clear  CHEST/LUNG: GBAE. No wheezing or crackles   HEART: regular rate and rhythm; S1/S2. systolic murmur head  ABDOMEN: Soft, Nontender, Nondistended  EXTREMITIES: No edema.   PSYCH: AAOx3.  NEUROLOGY: non-focal; moves all extremities

## 2024-04-07 NOTE — H&P ADULT - NSICDXPASTMEDICALHX_GEN_ALL_CORE_FT
PAST MEDICAL HISTORY:  AICD (automatic cardioverter/defibrillator) present     Chronic HFrEF (heart failure with reduced ejection fraction)     Diverticulitis sigmoid    DVT, lower extremity Bilateral    ESRD on dialysis     Gout     Hypertension     Pulmonary embolism     Rheumatoid arthritis

## 2024-04-07 NOTE — ED ADULT NURSE NOTE - NSFALLRISKINTERV_ED_ALL_ED
Operative Note      Patient: Andrea Orlando  YOB: 1982  MRN: [id number]    Date of Procedure: 2023    Pre-Op Diagnosis: Constipation, unspecified constipation type [K59.00]    Post-Op Diagnosis: {MH OR NMJF:[id number]}       Procedure(s):  COLONOSCOPY    Surgeon(s):  Monae Hodges MD    Assistant:   Surgical Assistant: Alma Carbone RN    Anesthesia: Monitor Anesthesia Care    Estimated Blood Loss (mL): {NUMBERS; QQ}    Complications: {Symptoms; Intra-op complications:06374}    Specimens:   * No specimens in log *    Implants:  * No implants in log *      Drains: * No LDAs found *    Findings:     Normal colon. No internal hemorrhoids.   Small external.     Detailed Description of Procedure:   ***    Electronically signed by Monae Hodges MD on 2023 at 12:25 PM was performed without difficulty. The patient tolerated the procedure well. Colonoscopy:    Anticoagulants: None. Primary Family Member/s with Colon Cancer: None. The quality of the bowel preparation was adequate. Some regions required significant washing/rinsing. The ileocecal valve, the appendiceal orifice, and the rectum via retroflex were photographed. Findings: On Perianal exam, small external hemorrhoids were appreciated. No kennedy-anal disease was appreciated. The mucosa of the cecum, ascending colon, transverse colon, descending colon, sigmoid colon, and rectum was normal. No inflammation or ulceration was appreciated. No polyps appreciated on this exam.     No internal hemorrhoids were present on rectal retroflexion. Recommendations:  -The patient will be observed post procedure until all discharge criteria are met. -Patient has a contact number available for emergencies. The signs and symptoms of potential delayed complications were discussed with the patient.    -Return to normal activities tomorrow. -Written discharge instructions were provided to the patient.    -Clinic appointment scheduled 1/31/23.         Electronically signed by Kristen Haddad MD on 1/17/2023 at 12:25 PM Assistance OOB with selected safe patient handling equipment if applicable/Assistance with ambulation/Communicate fall risk and risk factors to all staff, patient, and family/Provide visual cue: yellow wristband, yellow gown, etc/Reinforce activity limits and safety measures with patient and family/Call bell, personal items and telephone in reach/Instruct patient to call for assistance before getting out of bed/chair/stretcher/Non-slip footwear applied when patient is off stretcher/[place] to call system/Physically safe environment - no spills, clutter or unnecessary equipment/Purposeful Proactive Rounding/Room/bathroom lighting operational, light cord in reach

## 2024-04-07 NOTE — ED PROVIDER NOTE - OBJECTIVE STATEMENT
Pt is an 87 y/o female with PMH of ESRD on HD, CHF s/p AICD, HTN, HLD, DVT/PE on eliquis and anemia presenting for syncope. Pt reports Pt is an 87 y/o female with PMH of ESRD on HD, CHF s/p AICD, HTN, HLD, DVT/PE on eliquis and anemia presenting for syncope. As per daughter, pt went to dialysis and after had generalized weakness and seemed very tired. Pt endorsed lightheadedness. While in the car, pt syncopized for a couple seconds, witnessed by daughter. No seizure like activity

## 2024-04-07 NOTE — H&P ADULT - NSHPLABSRESULTS_GEN_ALL_CORE
10.3   6.97  )-----------( 210      ( 06 Apr 2024 23:03 )             33.8       04-06    140  |  101  |  17  ----------------------------<  127<H>  4.1   |  29  |  2.6<H>    Ca    8.8      06 Apr 2024 23:03  Mg     2.1     04-06    TPro  6.0  /  Alb  3.2<L>  /  TBili  0.3  /  DBili  x   /  AST  52<H>  /  ALT  21  /  AlkPhos  135<H>  04-06              Urinalysis Basic - ( 06 Apr 2024 23:03 )    Color: x / Appearance: x / SG: x / pH: x  Gluc: 127 mg/dL / Ketone: x  / Bili: x / Urobili: x   Blood: x / Protein: x / Nitrite: x   Leuk Esterase: x / RBC: x / WBC x   Sq Epi: x / Non Sq Epi: x / Bacteria: x            Lactate Trend            CAPILLARY BLOOD GLUCOSE      POCT Blood Glucose.: 124 mg/dL (06 Apr 2024 21:24)          Culture Results:   No growth at 5 days (03-27 @ 06:19)  Culture Results:   No growth at 5 days (03-27 @ 01:15)  Culture Results:   <10,000 CFU/mL Normal Urogenital Arturo (03-22 @ 21:20)  Culture Results:   50,000 - 99,000 CFU/mL Escherichia coli  <10,000 CFU/ml Normal Urogenital arturo present (03-21 @ 23:01)  Culture Results:   No growth at 5 days (03-21 @ 23:01)

## 2024-04-07 NOTE — H&P ADULT - ATTENDING COMMENTS
My note supersedes all residents notes that I sign, My correction for their notes are in my notes   On exam  General: awake, alert but confused /poor historian, stated that she came because she did not feel good , NAD, chronic ill appearance  Lungs:  clear to ausculation b/l, normal resp effort  Heart: regular rhythm ? murnur    Abdomen: soft, non tender non distended  Ext: no edema, can move all  his extremities    89yo  female patient (Amish) with hx of HTN, DVT/PE on eliquis, NICM - HFrEF(LVEF 40-45% in 9/22) s/p AICD Medtronic, RA, CKD stage 5 came after she was discharged home yesterday for syncope   Pt was admitted 3/22 and discharged yesterday, for fluctuating mental status and abdominal pain found to have PNA, enteritis and pyelo complicated by OMERO and CKD, TDC placed by IR 4/1 and RRT done 4/1 and 4/2, discharged with o/p HD arranged.    [] Syncope     could orthostatis or low blood pressure after HD yesterday   trop 96-->91, bun/crea 17/2.6 ( patient on HD)  get CXR and EKG today   cth: No acute intracranial pathology. Stable exam.  CTA angio neck moderate stenosis at the distal V2/proximal V3 segments of the right vertebral artery.   Focal moderate stenosis of the proximal intracranial segment of the right vertebral artery.  Consult neuroendovascular   neurocheckd   ct abd pelv with iv ct: No evidence of acute abdominopelvic pathology.  --> orthostatic   - neuroendovascular consult and neurochecks  tele and Echo   - EP for ICD interrogation (medtronic) and to see if MRI compatible   -send infection work up, cultures, RVP , procal   - PT/OT   repeat Liver function     [] ESRD newly started on HD  []anemia of CKD  - follows with Dr Urrutia  - non oliguric  - S/p TDC  -monitor electolytes /phos   - nephro consult  monitor CBC   - patient is Amish, will not accept blood products      #NICM - HFrEF(LVEF 35-40% in 9/22)   #s/p AICD MEdtronic  #HTN  cardiologist: Dr Mckinney  TTE at [facility] 3/2024: EF 45-50%, GIIDD  cw home : cw home amlodipine, toprol,   Blood Pressure is soft  ,monitor and resume  imdur, entresto and lasix  if Blood Pressure stable    ][ hx of VTE   cw home eliquis 2.5 mg bid    #RA: not on home meds      - DVT PPx: eliquis 2.5 mg bid  - GI PPx: protonix 40mg po qd   GOC - DNR/DNI - on prior admission and confirmed by the team today

## 2024-04-07 NOTE — ED ADULT NURSE REASSESSMENT NOTE - NSFALLRISKFACTORS_ED_ALL_ED
Discussion with podiatry team - they feel that the infected area was totally removed by surgery and culture taken of the removed bone as well as the proximal site that remains.      Agree with plan for 2 weeks of augmentin at this time  ID will follow culture and advise if necessary   Thanks for the consult    No indicators present

## 2024-04-07 NOTE — H&P ADULT - HISTORY OF PRESENT ILLNESS
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  --> presented today for syncope. As per daughter, pt went to dialysis and after had generalized weakness and seemed very tired. Pt endorsed lightheadedness. While in the car, pt syncopized for a couple seconds, witnessed by daughter. No seizure like activity.    ed vitals normal. hb 10.3 at baseline, trop 96-->91, bun/crea 17/2.6 ( patient on HD)  cxr  ekg    cth: No acute intracranial pathology. Stable exam.  ct angio neck with iv ct: Focal irregular moderate stenosis at the distal V2/proximal V3 segments of the right vertebral artery.   Focal moderate stenosis of the proximal intracranial segment of the right vertebral artery.  ct abd pelv with iv ct: No evidence of acute abdominopelvic pathology.    patient admitted for workup and management of syncope.    89yo  female patient (Mormonism) with hx of HTN, DVT/PE 2018 (thought provoked by travel) completed 6m of AC then 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  --> presented today for syncope. As per daughter, patient was discharged yesterday in the car she felt nauseous, when they got home she felt lightheaded became lethargic closed her eyes and wouldn't respond to the daughter for a few seconds then she regained consciousness. patient was feeling dizzy as well. but no pain, seizure like activity, or any other signs or sx.     ed vitals normal. hb 10.3 at baseline, trop 96-->91, bun/crea 17/2.6 ( patient on HD)  cxr and ekg not done yet  cth: No acute intracranial pathology. Stable exam.  ct angio neck with iv ct: Focal irregular moderate stenosis at the distal V2/proximal V3 segments of the right vertebral artery.   Focal moderate stenosis of the proximal intracranial segment of the right vertebral artery.  ct abd pelv with iv ct: No evidence of acute abdominopelvic pathology.    patient admitted for workup and management of syncope.

## 2024-04-07 NOTE — ED PROVIDER NOTE - CLINICAL SUMMARY MEDICAL DECISION MAKING FREE TEXT BOX
Throughout ED observation period, pt remained clinically and hemodynamically stable.  labs w/o acute findings, trop elevated in setting of esrd, no significant lyte abnormalities  cth w/ area of focal stenosis  ctap w/o acute findings  I, Curtis Gunderson- ED Attending, interpreted the chest x-ray - no opacities, free air or ptx  will admit for further monitoring, possible EP eval
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
## 2024-04-07 NOTE — ED PROVIDER NOTE - ATTENDING CONTRIBUTION TO CARE
87 y/o female with PMH of ESRD on HD, CHF s/p AICD, HTN, HLD, DVT/PE on eliquis and anemia presenting for syncope.  pt was feeling generally weak/tired after HD. on the drive home, pt had syncopal event in the car. no trauma. no fevers/chills/vomiting/sz like activity.  syncope lasted seconds. pt cannot say if there was a preceding sx.    vss  gen- elderly apearing, aaox3  card-rrr  lungs-ctab, no wheezing or rhonchi  abd-sntnd, no guarding or rebound  neuro- full str/sensation, cn ii-xii grossly intact, normal coordination, nonfocal exam

## 2024-04-08 LAB
ALBUMIN SERPL ELPH-MCNC: 3.1 G/DL — LOW (ref 3.5–5.2)
ALP SERPL-CCNC: 139 U/L — HIGH (ref 30–115)
ALT FLD-CCNC: 19 U/L — SIGNIFICANT CHANGE UP (ref 0–41)
ANION GAP SERPL CALC-SCNC: 11 MMOL/L — SIGNIFICANT CHANGE UP (ref 7–14)
AST SERPL-CCNC: 42 U/L — HIGH (ref 0–41)
BILIRUB SERPL-MCNC: 0.3 MG/DL — SIGNIFICANT CHANGE UP (ref 0.2–1.2)
BLD GP AB SCN SERPL QL: SIGNIFICANT CHANGE UP
BUN SERPL-MCNC: 26 MG/DL — HIGH (ref 10–20)
CALCIUM SERPL-MCNC: 8.7 MG/DL — SIGNIFICANT CHANGE UP (ref 8.4–10.4)
CHLORIDE SERPL-SCNC: 99 MMOL/L — SIGNIFICANT CHANGE UP (ref 98–110)
CO2 SERPL-SCNC: 27 MMOL/L — SIGNIFICANT CHANGE UP (ref 17–32)
CREAT SERPL-MCNC: 4.1 MG/DL — CRITICAL HIGH (ref 0.7–1.5)
EGFR: 10 ML/MIN/1.73M2 — LOW
FOLATE SERPL-MCNC: >20 NG/ML — SIGNIFICANT CHANGE UP
GLUCOSE SERPL-MCNC: 103 MG/DL — HIGH (ref 70–99)
HCT VFR BLD CALC: 29.7 % — LOW (ref 37–47)
HGB BLD-MCNC: 9.1 G/DL — LOW (ref 12–16)
MAGNESIUM SERPL-MCNC: 2.4 MG/DL — SIGNIFICANT CHANGE UP (ref 1.8–2.4)
MCHC RBC-ENTMCNC: 28.3 PG — SIGNIFICANT CHANGE UP (ref 27–31)
MCHC RBC-ENTMCNC: 30.6 G/DL — LOW (ref 32–37)
MCV RBC AUTO: 92.2 FL — SIGNIFICANT CHANGE UP (ref 81–99)
NRBC # BLD: 0 /100 WBCS — SIGNIFICANT CHANGE UP (ref 0–0)
PHOSPHATE SERPL-MCNC: 2.5 MG/DL — SIGNIFICANT CHANGE UP (ref 2.1–4.9)
PLATELET # BLD AUTO: 269 K/UL — SIGNIFICANT CHANGE UP (ref 130–400)
PMV BLD: 10 FL — SIGNIFICANT CHANGE UP (ref 7.4–10.4)
POTASSIUM SERPL-MCNC: 4.7 MMOL/L — SIGNIFICANT CHANGE UP (ref 3.5–5)
POTASSIUM SERPL-SCNC: 4.7 MMOL/L — SIGNIFICANT CHANGE UP (ref 3.5–5)
PROT SERPL-MCNC: 5.6 G/DL — LOW (ref 6–8)
RAPID RVP RESULT: SIGNIFICANT CHANGE UP
RBC # BLD: 3.22 M/UL — LOW (ref 4.2–5.4)
RBC # FLD: 13.6 % — SIGNIFICANT CHANGE UP (ref 11.5–14.5)
SARS-COV-2 RNA SPEC QL NAA+PROBE: SIGNIFICANT CHANGE UP
SODIUM SERPL-SCNC: 137 MMOL/L — SIGNIFICANT CHANGE UP (ref 135–146)
VIT B12 SERPL-MCNC: 1233 PG/ML — SIGNIFICANT CHANGE UP (ref 232–1245)
WBC # BLD: 6.45 K/UL — SIGNIFICANT CHANGE UP (ref 4.8–10.8)
WBC # FLD AUTO: 6.45 K/UL — SIGNIFICANT CHANGE UP (ref 4.8–10.8)

## 2024-04-08 PROCEDURE — 93284 PRGRMG EVAL IMPLANTABLE DFB: CPT | Mod: 26

## 2024-04-08 PROCEDURE — 93010 ELECTROCARDIOGRAM REPORT: CPT

## 2024-04-08 PROCEDURE — 99222 1ST HOSP IP/OBS MODERATE 55: CPT

## 2024-04-08 PROCEDURE — 99232 SBSQ HOSP IP/OBS MODERATE 35: CPT

## 2024-04-08 RX ORDER — ONDANSETRON 8 MG/1
4 TABLET, FILM COATED ORAL ONCE
Refills: 0 | Status: COMPLETED | OUTPATIENT
Start: 2024-04-08 | End: 2024-04-08

## 2024-04-08 RX ADMIN — Medication 50 MILLIGRAM(S): at 06:39

## 2024-04-08 RX ADMIN — Medication 325 MILLIGRAM(S): at 10:41

## 2024-04-08 RX ADMIN — SERTRALINE 25 MILLIGRAM(S): 25 TABLET, FILM COATED ORAL at 11:40

## 2024-04-08 RX ADMIN — CALCITRIOL 0.5 MICROGRAM(S): 0.5 CAPSULE ORAL at 17:37

## 2024-04-08 RX ADMIN — APIXABAN 2.5 MILLIGRAM(S): 2.5 TABLET, FILM COATED ORAL at 17:39

## 2024-04-08 RX ADMIN — ONDANSETRON 4 MILLIGRAM(S): 8 TABLET, FILM COATED ORAL at 10:42

## 2024-04-08 RX ADMIN — PANTOPRAZOLE SODIUM 40 MILLIGRAM(S): 20 TABLET, DELAYED RELEASE ORAL at 11:40

## 2024-04-08 RX ADMIN — APIXABAN 2.5 MILLIGRAM(S): 2.5 TABLET, FILM COATED ORAL at 06:39

## 2024-04-08 RX ADMIN — Medication 1 MILLIGRAM(S): at 11:40

## 2024-04-08 RX ADMIN — ATORVASTATIN CALCIUM 80 MILLIGRAM(S): 80 TABLET, FILM COATED ORAL at 21:38

## 2024-04-08 RX ADMIN — CALCITRIOL 0.5 MICROGRAM(S): 0.5 CAPSULE ORAL at 06:39

## 2024-04-08 NOTE — CONSULT NOTE ADULT - ATTENDING COMMENTS
# ESRD on HD T Th sat   # syncope     - HD in AM / no urgent need for HD today   - follow labs / work up for syncope in progress  - IP noted no binders. Hb noted ELIJHA with HD     will follow

## 2024-04-08 NOTE — PROGRESS NOTE ADULT - ASSESSMENT
89yo  female patient (Mosque) with hx of HTN, DVT/PE 2018, NICM - HFrEF(LVEF 40-45% in 9/22) s/p AICD Medtronic, RA, CKD stage 5   recently admitted 3/22 and discharged yesterday, for fluctuating mental status and abdominal pain found to have PNA, enteritis and pyelo complicated by OMERO and CKD, TDC placed by IR 4/1 and RRT done 4/1 and 4/2, discharged with o/p HD arranged.  --> presented today for syncope. As per daughter, patient was discharged yesterday in the car she felt nauseous, when they got home she felt lightheaded became lethargic closed her eyes and wouldn't respond to the daughter for a few seconds then she regained consciousness. patient was feeling dizzy as well. but no pain, seizure like activity, or any other signs or sx.   patient admitted for workup and management of syncope.       #Syncope   - possible 2/2 dehydration vs BP induced, r/o orthostatic or cardiac etiology, unlikely neurologic  - ed vitals normal. hb 10.3 at baseline, trop 96-->91, bun/crea 17/2.6 ( patient on HD)  - cxr and ekg not done yet  - cth: No acute intracranial pathology. Stable exam.  - ct angio neck with iv ct: Focal irregular moderate stenosis at the distal V2/proximal V3 segments of the right vertebral artery.   - Focal moderate stenosis of the proximal intracranial segment of the right vertebral artery.  - ct abd pelv with iv ct: No evidence of acute abdominopelvic pathology.  - orthostatic - incomplete but negative from supine to sitting up   - neuroendovascular consult and neurochecks: No acute neuroendovascular intervention is warranted at this time for the finding of right VA moderate stenosis on CTA.  May follow up outpatient with Dr. Bernstein in the neurology clinic for continued monitoring.[id number] Neuroendovascular with additional questions/ concerns on this admission.  - TTE ( heard a murmur)   - EKG   - EP for ICD interrogation (medtronic)  - r/o infx source: f.u cxr, UA , RVP  - PT   - monitor tele    #CKD stage 5 newly started on HD  - follows with Dr Urrutia  - non oliguric  - S/p TDC  - daily phos  - outpatient dialysis spot at [address]   - nephro consult       - HD in AM / no urgent need for HD today     # anemia of CKD  - sat 22% Ferritin 700, may receive VEnofer 200 mg IV x 2 doses    - patient is Mosque, will not accept blood products      #NICM - HFrEF(LVEF 35-40% in 9/22)   #s/p AICD MEdtronic  #HTN  cardiologist: Dr Mckinney  TTE at [facility] 3/2024: EF 45-50%, GIIDD  cw home : cw home amlodipine, toprol,   hold home imdur, entresto and lasix until orthostatic hypotension is ruled out    # DVT/PE 2018 (thought provoked by travel) completed 6m of AC then 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  cw home eliquis 2.5 mg bid    #RA: not on home meds      #MISC  - DVT PPx: eliquis 2.5 mg bid  - GI PPx: protonix 40mg po qd   - Diet: regular dash fluid restriction  - Activity: iat  - Labs: ordered  - Code: DNR DNI with NIV trial ([facility] 3/28) reconfirmed today 4/7/2024   - Dispo: tele    - Medrec: confirmed    #Progress Note Handoff  Pending (specify):  HD tomorrow, dispo planning  Family discussion: updating  Disposition:  Unknown at this time________

## 2024-04-08 NOTE — CONSULT NOTE ADULT - SUBJECTIVE AND OBJECTIVE BOX
NEPHROLOGY CONSULTATION NOTE    87yo  female patient (Confucianist) with hx of HTN, DVT/PE 2018 (thought provoked by travel) completed 6m of AC then 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, NICM - HFrEF(LVEF 40-45% in 9/22) s/p AICD Medtronic, RA, ESRD on HD  recently admitted 3/22 and discharged yesterday, for fluctuating mental status and abdominal pain found to have PNA, enteritis and pyelo complicated by OMERO and CKD, TDC placed by IR 4/1 and RRT done 4/1 and 4/2, discharged with o/p HD arranged.  Presented yesterday for syncope. Nephrology will be consulted for ESRD on HD.     PAST MEDICAL & SURGICAL HISTORY:  Hypertension  Gout  Diverticulitis  sigmoid  Rheumatoid arthritis  DVT, lower extremity  Bilateral  Pulmonary embolism  Chronic HFrEF (heart failure with reduced ejection fraction)  AICD (automatic cardioverter/defibrillator) present  ESRD on dialysis  Artificial pacemaker  History of appendectomy  History of tonsillectomy  History of hysterectomy  H/O hernia repair    Allergies:  cephalosporins (Angioedema)  Keflex (Swelling)    Home Medications Reviewed  Hospital Medications:   MEDICATIONS  (STANDING):  apixaban 2.5 milliGRAM(s) Oral every 12 hours  atorvastatin 80 milliGRAM(s) Oral at bedtime  calcitriol   Capsule 0.5 MICROGram(s) Oral every 12 hours  ferrous    sulfate 325 milliGRAM(s) Oral <User Schedule>  folic acid 1 milliGRAM(s) Oral daily  metoprolol succinate ER 50 milliGRAM(s) Oral daily  pantoprazole   Suspension 40 milliGRAM(s) Oral daily  sertraline 25 milliGRAM(s) Oral daily    SOCIAL HISTORY:  Denies ETOH,Smoking,   FAMILY HISTORY:  FH: arthritis  sister      DNI    DNI: Trial NIV      REVIEW OF SYSTEMS:    RESPIRATORY: No cough, wheezing, hemoptysis; No shortness of breath  CARDIOVASCULAR: No chest pain or palpitations.  GASTROINTESTINAL: No abdominal or epigastric pain. No nausea, vomiting, or hematemesis; No diarrhea or constipation. No melena or hematochezia.  GENITOURINARY: No dysuria, frequency, foamy urine, urinary urgency, incontinence or hematuria  NEUROLOGICAL: No numbness or weakness      VITALS:  Vital Signs Last 24 Hrs  T(C): 37.3 (08 Apr 2024 06:38), Max: 38.1 (07 Apr 2024 22:38)  T(F): 99.1 (08 Apr 2024 06:38), Max: 100.5 (07 Apr 2024 22:38)  HR: 90 (08 Apr 2024 06:38) (79 - 90)  BP: 145/71 (08 Apr 2024 06:38) (116/54 - 145/71)  BP(mean): 85 (07 Apr 2024 15:56) (85 - 85)  RR: 18 (08 Apr 2024 06:38) (18 - 18)  SpO2: 100% (08 Apr 2024 06:38) (97% - 100%)    Parameters below as of 08 Apr 2024 06:38  Patient On (Oxygen Delivery Method): room air      PHYSICAL EXAM:    Respiratory: CTAB, no wheezes, rales or rhonchi  Cardiovascular: S1, S2, RRR  Gastrointestinal: BS+, soft, NT/ND  Extremities: No cyanosis or clubbing. No peripheral edema    LABS:  04-07    132<L>  |  97<L>  |  23<H>  ----------------------------<  142<H>  4.3   |  24  |  3.4<H>    Ca    8.3<L>      07 Apr 2024 17:49  Phos  2.3     04-07  Mg     2.2     04-07    TPro  5.3<L>  /  Alb  2.8<L>  /  TBili  0.2  /  DBili      /  AST  39  /  ALT  19  /  AlkPhos  122<H>  04-07    Creatinine Trend: 3.4 <--, 2.6 <--, 3.4 <--, 2.7 <--, 3.9 <--, 3.2 <--, 4.1 <--, 6.2 <--, 6.1 <--, 6.4 <--, 6.0 <--, 6.2 <--, 5.6 <--, 5.8 <--, 5.7 <--, 5.4 <--, 5.5 <--, 5.6 <--, 5.0 <--, 4.7 <--, 5.0 <--, 4.8 <--, 4.8 <--                        8.4    6.51  )-----------( 210      ( 07 Apr 2024 17:49 )             26.9     Urine Studies:  Urinalysis Basic - ( 07 Apr 2024 17:49 )    Color:  / Appearance:  / SG:  / pH:   Gluc: 142 mg/dL / Ketone:   / Bili:  / Urobili:    Blood:  / Protein:  / Nitrite:    Leuk Esterase:  / RBC:  / WBC    Sq Epi:  / Non Sq Epi:  / Bacteria:       03-24 @ 20:00  8.2  138  --        RADIOLOGY & ADDITIONAL STUDIES:    CTAP      KIDNEYS: No hydronephrosis. Symmetric enhancement. Right renal cysts and additional cortical subcentimeter hyperintensities to small characterize.    ABDOMINOPELVIC NODES: No definite lymphadenopathy.    PELVIC ORGANS: Surgically absent uterus.    PERITONEUM/MESENTERY/BOWEL: No bowel obstruction. No free fluid or free air. Extensive colonic diverticulosis most prominent in the left and sigmoid colon. No evidence of acute diverticulitis..    BONES/SOFT TISSUES: Osteopenia. Degenerative changes.    OTHER: Vascular calcifications      IMPRESSION:  1. No evidence of acute abdominopelvic pathology.  2. Chronic findings as above.

## 2024-04-08 NOTE — CONSULT NOTE ADULT - ASSESSMENT
Incomplete Note    89yo  female patient (Pentecostalism) with hx of HTN, DVT/PE 2018 (thought provoked by travel) completed 6m of AC then 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, NICM - HFrEF(LVEF 40-45% in 9/22) s/p AICD Medtronic, RA, ESRD on HD recently admitted 3/22 and discharged yesterday, for fluctuating mental status and abdominal pain found to have PNA, enteritis and pyelo complicated by OMERO and CKD, TDC placed by IR 4/1 and RRT done 4/1 and 4/2, discharged with o/p HD arranged.  Presented yesterday for syncope. Nephrology will be consulted for ESRD on HD.        ESRD on HD/ Syncope    - TTS HD via TDC   - Last session outpatient 4/6  - HD tomorrow   - Not fluid overload   - Not hyperkalemic and not acidotic    - Blood pressure under control   - Hbg 8.4. ELIJAH with HD    - Get phosphorous, Vitamin D and PTH levels   - On venofer 200 mg IV twice weekly   - C/w home calcitriol dose

## 2024-04-08 NOTE — SWALLOW BEDSIDE ASSESSMENT ADULT - SLP PERTINENT HISTORY OF CURRENT PROBLEM
#Syncope - possible 2/2 dehydration vs BP induced, r/o orthostatic or cardiac etiology, unlikely neurologic

## 2024-04-08 NOTE — CHART NOTE - NSCHARTNOTEFT_GEN_A_CORE
Medtronic CRT-D interrogated 4/8/24  LV lead turned off  No events noted  Device functioning properly    Mode: AAI<=>DDD  bpm    Recall EP as needed 1249

## 2024-04-08 NOTE — PROGRESS NOTE ADULT - SUBJECTIVE AND OBJECTIVE BOX
RACHEL SUMMERS  88y  Female      Patient is a 88y old  Female who presents with a chief complaint of syncope (08 Apr 2024 13:42)      INTERVAL HPI/OVERNIGHT EVENTS: patient sleepy this morning and asking to rest      REVIEW OF SYSTEMS:  unable to obtain    T(C): 37.2 (04-08-24 @ 13:51), Max: 38.1 (04-07-24 @ 22:38)  HR: 72 (04-08-24 @ 08:25) (72 - 90)  BP: 138/70 (04-08-24 @ 08:25) (119/59 - 145/71)  RR: 18 (04-08-24 @ 13:51) (18 - 18)  SpO2: 100% (04-08-24 @ 08:25) (97% - 100%)  Wt(kg): --Vital Signs Last 24 Hrs  T(C): 37.2 (08 Apr 2024 13:51), Max: 38.1 (07 Apr 2024 22:38)  T(F): 98.9 (08 Apr 2024 13:51), Max: 100.5 (07 Apr 2024 22:38)  HR: 72 (08 Apr 2024 08:25) (72 - 90)  BP: 138/70 (08 Apr 2024 08:25) (119/59 - 145/71)  BP(mean): 85 (07 Apr 2024 15:56) (85 - 85)  RR: 18 (08 Apr 2024 13:51) (18 - 18)  SpO2: 100% (08 Apr 2024 08:25) (97% - 100%)    Parameters below as of 08 Apr 2024 08:25  Patient On (Oxygen Delivery Method): room air            PHYSICAL EXAM:  GENERAL: frail elderly F  PSYCH: no agitation   NERVOUS SYSTEM:  Alert & Oriented X3   PULMONARY: symmetrical chest rise, no accessory muscle use  CARDIOVASCULAR: non tachycardic  SKIN: No rashes or lesions    Consultant(s) Notes Reviewed:  [x ] YES  [ ] NO    Discussed with Consultants/Other Providers [ x] YES     LABS                          9.1    6.45  )-----------( 269      ( 08 Apr 2024 08:24 )             29.7     04-08    137  |  99  |  26<H>  ----------------------------<  103<H>  4.7   |  27  |  4.1<HH>    Ca    8.7      08 Apr 2024 08:24  Phos  2.5     04-08  Mg     2.4     04-08    TPro  5.6<L>  /  Alb  3.1<L>  /  TBili  0.3  /  DBili  x   /  AST  42<H>  /  ALT  19  /  AlkPhos  139<H>  04-08      Urinalysis Basic - ( 08 Apr 2024 08:24 )    Color: x / Appearance: x / SG: x / pH: x  Gluc: 103 mg/dL / Ketone: x  / Bili: x / Urobili: x   Blood: x / Protein: x / Nitrite: x   Leuk Esterase: x / RBC: x / WBC x   Sq Epi: x / Non Sq Epi: x / Bacteria: x    POCT Blood Glucose.: 124 mg/dL (06 Apr 2024 21:24)    RADIOLOGY & ADDITIONAL TESTS:   Xray Chest 1 View- PORTABLE-Urgent (Xray Chest 1 View- PORTABLE-Urgent .) (04.07.24 @ 16:56) >  IMPRESSION:  No radiographic evidence of acute cardiopulmonary disease.    < end of copied text >    Imaging Personally Reviewed:  [ ] YES  [x ] NO    HEALTH ISSUES - PROBLEM Dx:      MEDICATIONS  (STANDING):  apixaban 2.5 milliGRAM(s) Oral every 12 hours  atorvastatin 80 milliGRAM(s) Oral at bedtime  calcitriol   Capsule 0.5 MICROGram(s) Oral every 12 hours  ferrous    sulfate 325 milliGRAM(s) Oral <User Schedule>  folic acid 1 milliGRAM(s) Oral daily  metoprolol succinate ER 50 milliGRAM(s) Oral daily  pantoprazole   Suspension 40 milliGRAM(s) Oral daily  sertraline 25 milliGRAM(s) Oral daily    MEDICATIONS  (PRN):  acetaminophen     Tablet .. 650 milliGRAM(s) Oral every 6 hours PRN Temp greater or equal to 38C (100.4F)  albuterol    90 MICROgram(s) HFA Inhaler 2 Puff(s) Inhalation every 6 hours PRN for shortness of breath and/or wheezing

## 2024-04-08 NOTE — CONSULT NOTE ADULT - NS ATTEND AMEND GEN_ALL_CORE FT
All neuroradiology including CT, CTA, and MRI was reviewed by me independently of radiology.     Fatigue and dizziness in the setting of ESRD on HD and anemia.  Non focal exam.  No significant vertebral artery stenosis in the R (non-dom) side on my review.    No further neurointerventional evaluation needed at this time.

## 2024-04-08 NOTE — PATIENT PROFILE ADULT - FALL HARM RISK - HARM RISK INTERVENTIONS
Assistance with ambulation/Assistance OOB with selected safe patient handling equipment/Communicate Risk of Fall with Harm to all staff/Discuss with provider need for PT consult/Monitor gait and stability/Provide patient with walking aids - walker, cane, crutches/Reinforce activity limits and safety measures with patient and family/Review medications for side effects contributing to fall risk/Sit up slowly, dangle for a short time, stand at bedside before walking/Tailored Fall Risk Interventions/Toileting schedule using arm’s reach rule for commode and bathroom/Visual Cue: Yellow wristband and red socks/Bed in lowest position, wheels locked, appropriate side rails in place/Call bell, personal items and telephone in reach/Instruct patient to call for assistance before getting out of bed or chair/Non-slip footwear when patient is out of bed/[place] to call system/Physically safe environment - no spills, clutter or unnecessary equipment/Purposeful Proactive Rounding/Room/bathroom lighting operational, light cord in reach

## 2024-04-08 NOTE — CONSULT NOTE ADULT - SUBJECTIVE AND OBJECTIVE BOX
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  --> presented today for syncope. As per daughter, patient was discharged yesterday in the car she felt nauseous, when they got home she felt lightheaded became lethargic closed her eyes and wouldn't respond to the daughter for a few seconds then she regained consciousness. patient was feeling dizzy as well. but no pain, seizure like activity, or any other signs or sx.     ed vitals normal. hb 10.3 at baseline, trop 96-->91, bun/crea 17/2.6 ( patient on HD)  cxr and ekg not done yet  cth: No acute intracranial pathology. Stable exam.  ct angio neck with iv ct: Focal irregular moderate stenosis at the distal V2/proximal V3 segments of the right vertebral artery.   Focal moderate stenosis of the proximal intracranial segment of the right vertebral artery.  ct abd pelv with iv ct: No evidence of acute abdominopelvic pathology.    patient admitted for workup and management of syncope.    (07 Apr 2024 14:22)    Interval HPI: A neuroendovascular consult was placed for evaluation of distal right V2/V3 moderate stenosis visualized on CTA 4/7/2024. On exam, the patient has light sensitivity, fatigue, and severe nausea. Pertinent patient radiological imaging and clinical history were reviewed by Dr. Bernstein and the neuroendovascular ACP.    PAST MEDICAL & SURGICAL HISTORY:  Hypertension  Gout  Diverticulitis  sigmoid  Rheumatoid arthritis  DVT, lower extremity  Bilateral  Pulmonary embolism  Chronic HFrEF (heart failure with reduced ejection fraction)  AICD (automatic cardioverter/defibrillator) present  ESRD on dialysis  Artificial pacemaker  History of appendectomy  History of tonsillectomy  History of hysterectomy  H/O hernia repair    Pertinent PMHx     MEDICATIONS  (STANDING):  apixaban 2.5 milliGRAM(s) Oral every 12 hours  atorvastatin 80 milliGRAM(s) Oral at bedtime  calcitriol   Capsule 0.5 MICROGram(s) Oral every 12 hours  ferrous    sulfate 325 milliGRAM(s) Oral <User Schedule>  folic acid 1 milliGRAM(s) Oral daily  metoprolol succinate ER 50 milliGRAM(s) Oral daily  pantoprazole   Suspension 40 milliGRAM(s) Oral daily  sertraline 25 milliGRAM(s) Oral daily    MEDICATIONS  (PRN):  acetaminophen     Tablet .. 650 milliGRAM(s) Oral every 6 hours PRN Temp greater or equal to 38C (100.4F)  albuterol    90 MICROgram(s) HFA Inhaler 2 Puff(s) Inhalation every 6 hours PRN for shortness of breath and/or wheezing    Allergies  cephalosporins (Angioedema)  Keflex (Swelling)    FAMILY HISTORY:  FH: arthritis  sister    Physical Exam:   Vital Signs Last 24 Hrs  T(C): 36.7 (08 Apr 2024 08:25), Max: 38.1 (07 Apr 2024 22:38)  T(F): 98 (08 Apr 2024 08:25), Max: 100.5 (07 Apr 2024 22:38)  HR: 72 (08 Apr 2024 08:25) (72 - 90)  BP: 138/70 (08 Apr 2024 08:25) (119/59 - 145/71)  BP(mean): 85 (07 Apr 2024 15:56) (85 - 85)  RR: 18 (08 Apr 2024 08:25) (18 - 18)  SpO2: 100% (08 Apr 2024 08:25) (97% - 100%)    Parameters below as of 08 Apr 2024 08:25  Patient On (Oxygen Delivery Method): room air    General: Ill appearing, lying in bed with eyes covered  Neuro: AAOx1 to name, +EO to tactile stimulation, +blink to threat bilaterally, face symmetrical, moving b/l UE spontaneously antigravity, +b/l LE drift to bed. Exam limited by patient nausea/ refusal to participate.    Labs:                         9.1    6.45  )-----------( 269      ( 08 Apr 2024 08:24 )             29.7     04-08    137  |  99  |  26<H>  ----------------------------<  103<H>  4.7   |  27  |  4.1<HH>    Ca    8.7      08 Apr 2024 08:24  Phos  2.5     04-08  Mg     2.4     04-08    TPro  5.6<L>  /  Alb  3.1<L>  /  TBili  0.3  /  DBili  x   /  AST  42<H>  /  ALT  19  /  AlkPhos  139<H>  04-08    Pertinent labs:                      9.1    6.45  )-----------( 269      ( 08 Apr 2024 08:24 )             29.7       04-08    137  |  99  |  26<H>  ----------------------------<  103<H>  4.7   |  27  |  4.1<HH>    Ca    8.7      08 Apr 2024 08:24  Phos  2.5     04-08  Mg     2.4     04-08    TPro  5.6<L>  /  Alb  3.1<L>  /  TBili  0.3  /  DBili  x   /  AST  42<H>  /  ALT  19  /  AlkPhos  139<H>  04-08    Radiology & Additional Studies:   Radiology imaging reviewed.     Assessment:   88-year-old female with a PMHx of HTN, DVT/ PE in 2018, VTE on Eliquis, HFrEF s/p AICD, RA, and CKD 5, who was admitted 3/22 and returned with nausea and fatigue. The patient denies dizziness, headache, and pain elsewhere.  Pt has anemia consistent w/ chronic dz. A neuroendovascular consult was placed for evaluation of distal right V2/V3 moderate stenosis visualized on CTA 4/7/2024. Pertinent patient radiological imaging and clinical history were reviewed by Dr. Bernstein and the neuroendovascular ACP.    Suggestions:   - No acute neuroendovascular intervention is warranted at this time for the finding of right VA moderate stenosis on CTA.  - May follow up outpatient with Dr. Bernstein in the neurology clinic for continued monitoring.  [id number] Neuroendovascular with additional questions/ concerns on this admission.   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  --> presented today for syncope. As per daughter, patient was discharged yesterday in the car she felt nauseous, when they got home she felt lightheaded became lethargic closed her eyes and wouldn't respond to the daughter for a few seconds then she regained consciousness. patient was feeling dizzy as well. but no pain, seizure like activity, or any other signs or sx.     ed vitals normal. hb 10.3 at baseline, trop 96-->91, bun/crea 17/2.6 ( patient on HD)  cxr and ekg not done yet  cth: No acute intracranial pathology. Stable exam.  ct angio neck with iv ct: Focal irregular moderate stenosis at the distal V2/proximal V3 segments of the right vertebral artery.   Focal moderate stenosis of the proximal intracranial segment of the right vertebral artery.  ct abd pelv with iv ct: No evidence of acute abdominopelvic pathology.    patient admitted for workup and management of syncope.    (07 Apr 2024 14:22)    Interval HPI: A neuroendovascular consult was placed for evaluation of distal right V2/V3 moderate stenosis visualized on CTA 4/7/2024. On exam, the patient has light sensitivity, fatigue, and severe nausea. Pertinent patient radiological imaging and clinical history were reviewed by Dr. Bernstein and the neuroendovascular ACP.    PAST MEDICAL & SURGICAL HISTORY:  Hypertension  Gout  Diverticulitis  sigmoid  Rheumatoid arthritis  DVT, lower extremity  Bilateral  Pulmonary embolism  Chronic HFrEF (heart failure with reduced ejection fraction)  AICD (automatic cardioverter/defibrillator) present  ESRD on dialysis  Artificial pacemaker  History of appendectomy  History of tonsillectomy  History of hysterectomy  H/O hernia repair    Pertinent PMHx     MEDICATIONS  (STANDING):  apixaban 2.5 milliGRAM(s) Oral every 12 hours  atorvastatin 80 milliGRAM(s) Oral at bedtime  calcitriol   Capsule 0.5 MICROGram(s) Oral every 12 hours  ferrous    sulfate 325 milliGRAM(s) Oral <User Schedule>  folic acid 1 milliGRAM(s) Oral daily  metoprolol succinate ER 50 milliGRAM(s) Oral daily  pantoprazole   Suspension 40 milliGRAM(s) Oral daily  sertraline 25 milliGRAM(s) Oral daily    MEDICATIONS  (PRN):  acetaminophen     Tablet .. 650 milliGRAM(s) Oral every 6 hours PRN Temp greater or equal to 38C (100.4F)  albuterol    90 MICROgram(s) HFA Inhaler 2 Puff(s) Inhalation every 6 hours PRN for shortness of breath and/or wheezing    Allergies  cephalosporins (Angioedema)  Keflex (Swelling)    FAMILY HISTORY:  FH: arthritis  sister    Physical Exam:   Vital Signs Last 24 Hrs  T(C): 36.7 (08 Apr 2024 08:25), Max: 38.1 (07 Apr 2024 22:38)  T(F): 98 (08 Apr 2024 08:25), Max: 100.5 (07 Apr 2024 22:38)  HR: 72 (08 Apr 2024 08:25) (72 - 90)  BP: 138/70 (08 Apr 2024 08:25) (119/59 - 145/71)  BP(mean): 85 (07 Apr 2024 15:56) (85 - 85)  RR: 18 (08 Apr 2024 08:25) (18 - 18)  SpO2: 100% (08 Apr 2024 08:25) (97% - 100%)    Parameters below as of 08 Apr 2024 08:25  Patient On (Oxygen Delivery Method): room air    General: Ill appearing, lying in bed with eyes covered  Neuro: AAOx1 to name, +EO to tactile stimulation, +blink to threat bilaterally, face symmetrical, moving b/l UE spontaneously antigravity, +b/l LE drift to bed. Exam limited by patient nausea/ refusal to participate.    Labs:                         9.1    6.45  )-----------( 269      ( 08 Apr 2024 08:24 )             29.7     04-08    137  |  99  |  26<H>  ----------------------------<  103<H>  4.7   |  27  |  4.1<HH>    Ca    8.7      08 Apr 2024 08:24  Phos  2.5     04-08  Mg     2.4     04-08    TPro  5.6<L>  /  Alb  3.1<L>  /  TBili  0.3  /  DBili  x   /  AST  42<H>  /  ALT  19  /  AlkPhos  139<H>  04-08    Pertinent labs:                      9.1    6.45  )-----------( 269      ( 08 Apr 2024 08:24 )             29.7       04-08    137  |  99  |  26<H>  ----------------------------<  103<H>  4.7   |  27  |  4.1<HH>    Ca    8.7      08 Apr 2024 08:24  Phos  2.5     04-08  Mg     2.4     04-08    TPro  5.6<L>  /  Alb  3.1<L>  /  TBili  0.3  /  DBili  x   /  AST  42<H>  /  ALT  19  /  AlkPhos  139<H>  04-08    Radiology & Additional Studies:   Radiology imaging reviewed.     Assessment:   88-year-old female with a PMHx of HTN, DVT/ PE in 2018, VTE on Eliquis, HFrEF s/p AICD, RA, and CKD 5, who was admitted 3/22 and returned with nausea and fatigue. The patient denies dizziness, headache, and pain elsewhere.  Pt has anemia consistent w/ chronic dz. A neuroendovascular consult was placed for evaluation of distal right V2/V3 moderate stenosis visualized on CTA 4/7/2024. Pertinent patient radiological imaging and clinical history were reviewed by Dr. Bernstein and the neuroendovascular ACP.    Suggestions:   - No acute neuroendovascular intervention is warranted at this time for the finding of right VA moderate stenosis on CTA.  - The patient may follow up outpatient with Dr. Bernstein in the neurology clinic for continued monitoring after discharge.  [id number] Neuroendovascular with additional questions/ concerns on this admission.

## 2024-04-09 ENCOUNTER — RESULT REVIEW (OUTPATIENT)
Age: 89
End: 2024-04-09

## 2024-04-09 LAB
24R-OH-CALCIDIOL SERPL-MCNC: 11 NG/ML — LOW (ref 30–80)
ALBUMIN SERPL ELPH-MCNC: 2.7 G/DL — LOW (ref 3.5–5.2)
ALP SERPL-CCNC: 144 U/L — HIGH (ref 30–115)
ALT FLD-CCNC: 19 U/L — SIGNIFICANT CHANGE UP (ref 0–41)
ANION GAP SERPL CALC-SCNC: 9 MMOL/L — SIGNIFICANT CHANGE UP (ref 7–14)
AST SERPL-CCNC: 41 U/L — SIGNIFICANT CHANGE UP (ref 0–41)
BASOPHILS # BLD AUTO: 0.06 K/UL — SIGNIFICANT CHANGE UP (ref 0–0.2)
BASOPHILS NFR BLD AUTO: 0.9 % — SIGNIFICANT CHANGE UP (ref 0–1)
BILIRUB SERPL-MCNC: 0.3 MG/DL — SIGNIFICANT CHANGE UP (ref 0.2–1.2)
BUN SERPL-MCNC: 33 MG/DL — HIGH (ref 10–20)
CALCIUM SERPL-MCNC: 8.2 MG/DL — LOW (ref 8.4–10.5)
CALCIUM SERPL-MCNC: 8.3 MG/DL — LOW (ref 8.4–10.5)
CHLORIDE SERPL-SCNC: 97 MMOL/L — LOW (ref 98–110)
CO2 SERPL-SCNC: 27 MMOL/L — SIGNIFICANT CHANGE UP (ref 17–32)
CREAT SERPL-MCNC: 5 MG/DL — CRITICAL HIGH (ref 0.7–1.5)
EGFR: 8 ML/MIN/1.73M2 — LOW
EOSINOPHIL # BLD AUTO: 0.24 K/UL — SIGNIFICANT CHANGE UP (ref 0–0.7)
EOSINOPHIL NFR BLD AUTO: 3.5 % — SIGNIFICANT CHANGE UP (ref 0–8)
GLUCOSE SERPL-MCNC: 85 MG/DL — SIGNIFICANT CHANGE UP (ref 70–99)
HCT VFR BLD CALC: 28 % — LOW (ref 37–47)
HGB BLD-MCNC: 8.5 G/DL — LOW (ref 12–16)
LYMPHOCYTES # BLD AUTO: 0.85 K/UL — LOW (ref 1.2–3.4)
LYMPHOCYTES # BLD AUTO: 12.2 % — LOW (ref 20.5–51.1)
MAGNESIUM SERPL-MCNC: 2.1 MG/DL — SIGNIFICANT CHANGE UP (ref 1.8–2.4)
MCHC RBC-ENTMCNC: 28.1 PG — SIGNIFICANT CHANGE UP (ref 27–31)
MCHC RBC-ENTMCNC: 30.4 G/DL — LOW (ref 32–37)
MCV RBC AUTO: 92.4 FL — SIGNIFICANT CHANGE UP (ref 81–99)
MONOCYTES # BLD AUTO: 1.27 K/UL — HIGH (ref 0.1–0.6)
MONOCYTES NFR BLD AUTO: 18.2 % — HIGH (ref 1.7–9.3)
NEUTROPHILS # BLD AUTO: 4.37 K/UL — SIGNIFICANT CHANGE UP (ref 1.4–6.5)
NEUTROPHILS NFR BLD AUTO: 61.7 % — SIGNIFICANT CHANGE UP (ref 42.2–75.2)
PHOSPHATE SERPL-MCNC: 2.8 MG/DL — SIGNIFICANT CHANGE UP (ref 2.1–4.9)
PLATELET # BLD AUTO: 254 K/UL — SIGNIFICANT CHANGE UP (ref 130–400)
PMV BLD: 9.5 FL — SIGNIFICANT CHANGE UP (ref 7.4–10.4)
POTASSIUM SERPL-MCNC: 5 MMOL/L — SIGNIFICANT CHANGE UP (ref 3.5–5)
POTASSIUM SERPL-SCNC: 5 MMOL/L — SIGNIFICANT CHANGE UP (ref 3.5–5)
PROT SERPL-MCNC: 5.3 G/DL — LOW (ref 6–8)
PTH-INTACT FLD-MCNC: 35 PG/ML — SIGNIFICANT CHANGE UP (ref 15–65)
RBC # BLD: 3.03 M/UL — LOW (ref 4.2–5.4)
RBC # FLD: 13.5 % — SIGNIFICANT CHANGE UP (ref 11.5–14.5)
SODIUM SERPL-SCNC: 133 MMOL/L — LOW (ref 135–146)
VIT D25+D1,25 OH+D1,25 PNL SERPL-MCNC: 60.7 PG/ML — SIGNIFICANT CHANGE UP (ref 19.9–79.3)
WBC # BLD: 6.98 K/UL — SIGNIFICANT CHANGE UP (ref 4.8–10.8)
WBC # FLD AUTO: 6.98 K/UL — SIGNIFICANT CHANGE UP (ref 4.8–10.8)

## 2024-04-09 PROCEDURE — 99232 SBSQ HOSP IP/OBS MODERATE 35: CPT

## 2024-04-09 PROCEDURE — 93312 ECHO TRANSESOPHAGEAL: CPT | Mod: 26

## 2024-04-09 PROCEDURE — 93320 DOPPLER ECHO COMPLETE: CPT | Mod: 26

## 2024-04-09 PROCEDURE — 93325 DOPPLER ECHO COLOR FLOW MAPG: CPT | Mod: 26

## 2024-04-09 RX ORDER — DARBEPOETIN ALFA IN POLYSORBAT 200MCG/0.4
25 PEN INJECTOR (ML) SUBCUTANEOUS
Refills: 0 | Status: DISCONTINUED | OUTPATIENT
Start: 2024-04-09 | End: 2024-04-15

## 2024-04-09 RX ADMIN — SERTRALINE 25 MILLIGRAM(S): 25 TABLET, FILM COATED ORAL at 14:51

## 2024-04-09 RX ADMIN — CALCITRIOL 0.5 MICROGRAM(S): 0.5 CAPSULE ORAL at 05:23

## 2024-04-09 RX ADMIN — ATORVASTATIN CALCIUM 80 MILLIGRAM(S): 80 TABLET, FILM COATED ORAL at 21:20

## 2024-04-09 RX ADMIN — Medication 25 MICROGRAM(S): at 14:03

## 2024-04-09 RX ADMIN — Medication 1 MILLIGRAM(S): at 14:52

## 2024-04-09 RX ADMIN — Medication 50 MILLIGRAM(S): at 05:24

## 2024-04-09 RX ADMIN — PANTOPRAZOLE SODIUM 40 MILLIGRAM(S): 20 TABLET, DELAYED RELEASE ORAL at 14:52

## 2024-04-09 RX ADMIN — CALCITRIOL 0.5 MICROGRAM(S): 0.5 CAPSULE ORAL at 18:03

## 2024-04-09 RX ADMIN — APIXABAN 2.5 MILLIGRAM(S): 2.5 TABLET, FILM COATED ORAL at 18:03

## 2024-04-09 RX ADMIN — APIXABAN 2.5 MILLIGRAM(S): 2.5 TABLET, FILM COATED ORAL at 05:24

## 2024-04-09 NOTE — PROGRESS NOTE ADULT - ASSESSMENT
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  Presented yesterday for syncope. Nephrology will be consulted for ESRD on HD.    ESRD on HD/ Syncope    - HD today f 1liter as tolerated    - Blood pressure on the low side / might need midodrine before HD if remains low   - Hbg 8.5. ELIJAH  25 weekly with HD    - ph on the low side / no binders    - On venofer 200 mg IV twice weekly   - C/w home calcitriol dose   will follow

## 2024-04-09 NOTE — PROGRESS NOTE ADULT - ASSESSMENT
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  --> presented today for syncope. As per daughter, patient was discharged yesterday in the car she felt nauseous, when they got home she felt lightheaded became lethargic closed her eyes and wouldn't respond to the daughter for a few seconds then she regained consciousness. patient was feeling dizzy as well. but no pain, seizure like activity, or any other signs or sx.   patient admitted for workup and management of syncope.       #Syncope - possible 2/2 dehydration vs BP induced, r/o orthostatic or cardiac etiology, unlikely neurologic  ed vitals normal. hb 10.3 at baseline, trop 96-->91, bun/crea 17/2.6 ( patient on HD)  cxr and ekg not done yet  cth: No acute intracranial pathology. Stable exam.  ct angio neck with iv ct: Focal irregular moderate stenosis at the distal V2/proximal V3 segments of the right vertebral artery.   Focal moderate stenosis of the proximal intracranial segment of the right vertebral artery.  ct abd pelv with iv ct: No evidence of acute abdominopelvic pathology.  --> orthostatic   - neuroendovascular consult and neurochecks- no neuroendovascular intervention for vascular stenosis  - TTE ( heard a murmur)   - EKG   - EP for ICD interrogation (medtronic)- device functioning properly  - r/o infx source: f.u cxr, UA , RVP  - PT   - monitor tele    #CKD stage 5 newly started on HD  - follows with Dr Urrutia  - non oliguric  - S/p TDC  - daily phos  - outpatient dialysis spot at [address]   - nephro consult    # anemia of CKD  - sat 22% Ferritin 700, may receive VEnofer 200 mg IV x 2 doses    - patient is Taoism, will not accept blood products      #NICM - HFrEF(LVEF 35-40% in 9/22)   #s/p AICD MEdtronic  #HTN  cardiologist: Dr Mckinney  TTE at [facility] 3/2024: EF 45-50%, GIIDD  cw home : cw home amlodipine, toprol,   hold home imdur, entresto and lasix until orthostatic hypotension is ruled out    # DVT/PE 2018 (thought provoked by travel) completed 6m of AC then 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  cw home eliquis 2.5 mg bid    #RA: not on home meds      #MISC  - DVT PPx: eliquis 2.5 mg bid  - GI PPx: protonix 40mg po qd   - Diet: regular dash fluid restriction  - Activity: iat  - Labs: ordered  - Code: DNR DNI with NIV trial ([facility] 3/28) reconfirmed today 4/7/2024   - Dispo: tele  - Medrec: confirmed    **********   THIS IS AN INCOMPLETE NOTE, FINAL NOTE PENDING   **********   87yo  female patient (Jainism) with hx of HTN, DVT/PE 2018 (thought provoked by travel) completed 6m of AC then 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  --> presented today for syncope. As per daughter, patient was discharged yesterday in the car she felt nauseous, when they got home she felt lightheaded became lethargic closed her eyes and wouldn't respond to the daughter for a few seconds then she regained consciousness. patient was feeling dizzy as well. but no pain, seizure like activity, or any other signs or sx.   patient admitted for workup and management of syncope.       #Syncope - possible 2/2 dehydration vs BP induced, r/o orthostatic or cardiac etiology, unlikely neurologic  - ed vitals normal. hb 10.3 at baseline, trop 96-->91, bun/crea 17/2.6 ( patient on HD)  - cxr- neg and ekg- PAC  - cth: No acute intracranial pathology. Stable exam.  - ct angio neck with iv ct: Focal irregular moderate stenosis at the distal V2/proximal V3 segments of the right vertebral artery.   - Focal moderate stenosis of the proximal intracranial segment of the right vertebral artery.  - ct abd pelv with iv ct: No evidence of acute abdominopelvic pathology.  --> orthostatic - could not stand   - neuroendovascular consult and neurochecks- no neuroendovascular intervention for vascular stenosis  - TTE ( heard a murmur)   - EKG   - EP for ICD interrogation (medtronic)- device functioning properly  - r/o infx source: f.u cxr, UA , RVP  - PT   - monitor tele    #CKD stage 5 newly started on HD  - follows with Dr Urrutia  - non oliguric  - S/p TDC  - daily phos  - outpatient dialysis spot [address]   - nephro consult:  HD today f 1liter as tolerated    Blood pressure on the low side / might need midodrine before HD if remains low   On venofer 200 mg IV twice weekly   C/w home calcitriol dose     # anemia of CKD  - sat 22% Ferritin 700, may receive VEnofer 200 mg IV x 2 doses    - patient is Jainism, will not accept blood products    #NICM - HFrEF(LVEF 35-40% in 9/22)   #s/p AICD MEdtronic  #HTN  cardiologist: Dr Mckinney  TTE at [facility] 3/2024: EF 45-50%, GIIDD  cw home : cw home amlodipine, toprol,   hold home imdur, entresto and lasix until orthostatic hypotension is ruled out    # DVT/PE 2018 (thought provoked by travel) completed 6m of AC then 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

## 2024-04-09 NOTE — PROGRESS NOTE ADULT - SUBJECTIVE AND OBJECTIVE BOX
seen and examined  24 h events noted   no distress       PAST HISTORY  --------------------------------------------------------------------------------  No significant changes to PMH, PSH, FHx, SHx, unless otherwise noted    ALLERGIES & MEDICATIONS  --------------------------------------------------------------------------------  Allergies    cephalosporins (Angioedema)  Keflex (Swelling)    Intolerances      Standing Inpatient Medications  apixaban 2.5 milliGRAM(s) Oral every 12 hours  atorvastatin 80 milliGRAM(s) Oral at bedtime  calcitriol   Capsule 0.5 MICROGram(s) Oral every 12 hours  ferrous    sulfate 325 milliGRAM(s) Oral <User Schedule>  folic acid 1 milliGRAM(s) Oral daily  metoprolol succinate ER 50 milliGRAM(s) Oral daily  pantoprazole   Suspension 40 milliGRAM(s) Oral daily  sertraline 25 milliGRAM(s) Oral daily    PRN Inpatient Medications  acetaminophen     Tablet .. 650 milliGRAM(s) Oral every 6 hours PRN  albuterol    90 MICROgram(s) HFA Inhaler 2 Puff(s) Inhalation every 6 hours PRN      VITALS/PHYSICAL EXAM  --------------------------------------------------------------------------------  T(C): 36.3 (04-09-24 @ 04:49), Max: 37.2 (04-08-24 @ 13:51)  HR: 72 (04-09-24 @ 04:49) (72 - 97)  BP: 102/53 (04-09-24 @ 04:49) (102/53 - 138/70)  RR: 18 (04-09-24 @ 04:49) (18 - 18)  SpO2: 100% (04-08-24 @ 08:25) (100% - 100%)  Wt(kg): --  Height (cm): 160 (04-08-24 @ 13:51)  Weight (kg): 55 (04-08-24 @ 13:51)  BMI (kg/m2): 21.5 (04-08-24 @ 13:51)  BSA (m2): 1.56 (04-08-24 @ 13:51)      04-08-24 @ 07:01  -  04-09-24 @ 07:00  --------------------------------------------------------  IN: 0 mL / OUT: 300 mL / NET: -300 mL      Physical Exam:  	Gen: NAD  	Pulm: decrease BS  B/L  	CV:  S1S2; no rub  	Abd: +distended  	LE:  no edema  	Vascular access:tdc     LABS/STUDIES  --------------------------------------------------------------------------------              8.5    6.98  >-----------<  254      [04-09-24 @ 05:43]              28.0     133  |  97  |  33  ----------------------------<  85      [04-09-24 @ 05:43]  5.0   |  27  |  5.0        Ca     8.3     [04-09-24 @ 05:43]      Mg     2.1     [04-09-24 @ 05:43]      Phos  2.8     [04-09-24 @ 05:43]    TPro  5.3  /  Alb  2.7  /  TBili  0.3  /  DBili  x   /  AST  41  /  ALT  19  /  AlkPhos  144  [04-09-24 @ 05:43]    Creatinine Trend:  SCr 5.0 [04-09 @ 05:43]  SCr 4.1 [04-08 @ 08:24]  SCr 3.4 [04-07 @ 17:49]  SCr 2.6 [04-06 @ 23:03]  SCr 3.4 [04-06 @ 07:15]    Urinalysis - [04-09-24 @ 05:43]      Color  / Appearance  / SG  / pH       Gluc 85 / Ketone   / Bili  / Urobili        Blood  / Protein  / Leuk Est  / Nitrite       RBC  / WBC  / Hyaline  / Gran  / Sq Epi  / Non Sq Epi  / Bacteria       Iron 33, TIBC 147, %sat 22      [04-04-24 @ 06:44]  Ferritin 785      [04-04-24 @ 06:44]  PTH -- (Ca 8.2)      [03-24-24 @ 20:00]   138  Vitamin D (25OH) 9      [03-24-24 @ 20:00]  Lipid: chol 144, , HDL 39, LDL --      [03-22-24 @ 06:14]    HBsAb Nonreact      [03-27-24 @ 15:55]  HBsAg Nonreact      [04-01-24 @ 15:52]  HBcAb Nonreact      [04-03-24 @ 16:02]  HCV 0.20, Nonreact      [04-01-24 @ 15:52]

## 2024-04-09 NOTE — PROGRESS NOTE ADULT - ATTENDING COMMENTS
***My note supersedes any discrepancies that may be above in the resident's note***    89yo  female patient (Pentecostal) with hx of HTN, DVT/PE 2018, NICM - HFrEF(LVEF 40-45% in 9/22) s/p AICD Medtronic, RA, CKD stage 5 recently admitted 3/22  presented for syncope.       #Syncope   - possible 2/2 dehydration vs BP induced, r/o orthostatic or cardiac etiology, unlikely neurologic  - ed vitals normal. hb 10.3 at baseline, trop 96-->91, bun/crea 17/2.6 ( patient on HD)  - cth: No acute intracranial pathology. Stable exam.  - ct angio neck with iv ct: Focal irregular moderate stenosis at the distal V2/proximal V3 segments of the right vertebral artery.   - Focal moderate stenosis of the proximal intracranial segment of the right vertebral artery.  - ct abd pelv with iv ct: No evidence of acute abdominopelvic pathology.  - orthostatic - incomplete but negative from supine to sitting up   - neuroendovascular consult and neurochecks: No acute neuroendovascular intervention is warranted at this time for the finding of right VA moderate stenosis on CTA.  May follow up outpatient with Dr. Bernstein in the neurology clinic for continued monitoring.[id number] Neuroendovascular with additional questions/ concerns on this admission.  - TTE   - EP for ICD interrogation (medtronic)  - r/o infx source: f.u cxr, UA , RVP  - PT   - monitor tele    #CKD stage 5 newly started on HD  - follows with Dr Urrutia  - non oliguric  - S/p TDC  - daily phos  - outpatient dialysis spot at [address]   - nephro consult       - HD today    # anemia of CKD  - sat 22% Ferritin 700, may receive VEnofer 200 mg IV x 2 doses    - patient is Pentecostal, will not accept blood products      #NICM - HFrEF(LVEF 35-40% in 9/22)   #s/p AICD MEdtronic  #HTN  cardiologist: Dr Mckinney  TTE at [facility] 3/2024: EF 45-50%, GIIDD  cw home : cw home amlodipine, toprol,   hold home imdur, entresto and lasix until orthostatic hypotension is ruled out    # DVT/PE 2018 (thought provoked by travel) completed 6m of AC then 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  cw home eliquis 2.5 mg bid    #RA: not on home meds      #MISC  - DVT PPx: eliquis 2.5 mg bid  - GI PPx: protonix 40mg po qd   - Diet: regular dash fluid restriction  - Activity: iat  - Labs: ordered  - Code: DNR DNI with NIV trial ([facility] 3/28) reconfirmed today 4/7/2024   - Dispo: tele    - Medrec: confirmed    #Progress Note Handoff  Pending (specify):  HD today, dispo planning, PT eval  Family discussion: updating  Disposition:  Unknown at this time________

## 2024-04-09 NOTE — PROGRESS NOTE ADULT - SUBJECTIVE AND OBJECTIVE BOX
RACHEL SUMMERS 88y Female  MRN#: [id number]   Hospital Day: 2d    SUBJECTIVE  87yo  female patient (Sikhism) with hx of HTN, DVT/PE 2018 (thought provoked by travel) completed 6m of AC then 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  --> presented today for syncope. As per daughter, patient was discharged yesterday in the car she felt nauseous, when they got home she felt lightheaded became lethargic closed her eyes and wouldn't respond to the daughter for a few seconds then she regained consciousness. patient was feeling dizzy as well. but no pain, seizure like activity, or any other signs or sx.     ed vitals normal. hb 10.3 at baseline, trop 96-->91, bun/crea 17/2.6 ( patient on HD)  cxr and ekg not done yet  cth: No acute intracranial pathology. Stable exam.  ct angio neck with iv ct: Focal irregular moderate stenosis at the distal V2/proximal V3 segments of the right vertebral artery.   Focal moderate stenosis of the proximal intracranial segment of the right vertebral artery.  ct abd pelv with iv ct: No evidence of acute abdominopelvic pathology.    patient admitted for workup and management of syncope.     Patient is a 88y old Female who presents with a chief complaint of syncope (08 Apr 2024 15:19)  Currently admitted to medicine with the primary diagnosis of Syncope      INTERVAL HPI AND OVERNIGHT EVENTS:  Patient was examined and seen at bedside. This morning she is resting comfortably in bed and reports no issues or overnight events.    OBJECTIVE  PAST MEDICAL & SURGICAL HISTORY  Hypertension    Gout    Diverticulitis  sigmoid    Rheumatoid arthritis    DVT, lower extremity  Bilateral    Pulmonary embolism    Chronic HFrEF (heart failure with reduced ejection fraction)    AICD (automatic cardioverter/defibrillator) present    ESRD on dialysis    Artificial pacemaker    History of appendectomy    History of tonsillectomy    History of hysterectomy    H/O hernia repair      ALLERGIES:  cephalosporins (Angioedema)  Keflex (Swelling)    MEDICATIONS:  STANDING MEDICATIONS  apixaban 2.5 milliGRAM(s) Oral every 12 hours  atorvastatin 80 milliGRAM(s) Oral at bedtime  calcitriol   Capsule 0.5 MICROGram(s) Oral every 12 hours  ferrous    sulfate 325 milliGRAM(s) Oral <User Schedule>  folic acid 1 milliGRAM(s) Oral daily  metoprolol succinate ER 50 milliGRAM(s) Oral daily  pantoprazole   Suspension 40 milliGRAM(s) Oral daily  sertraline 25 milliGRAM(s) Oral daily    PRN MEDICATIONS  acetaminophen     Tablet .. 650 milliGRAM(s) Oral every 6 hours PRN  albuterol    90 MICROgram(s) HFA Inhaler 2 Puff(s) Inhalation every 6 hours PRN      VITAL SIGNS: Last 24 Hours  T(C): 36.3 (09 Apr 2024 04:49), Max: 37.2 (08 Apr 2024 13:51)  T(F): 97.3 (09 Apr 2024 04:49), Max: 98.9 (08 Apr 2024 13:51)  HR: 72 (09 Apr 2024 04:49) (72 - 97)  BP: 102/53 (09 Apr 2024 04:49) (102/53 - 138/70)  BP(mean): --  RR: 18 (09 Apr 2024 04:49) (18 - 18)  SpO2: 100% (08 Apr 2024 08:25) (100% - 100%)    LABS:                        9.1    6.45  )-----------( 269      ( 08 Apr 2024 08:24 )             29.7     04-08    137  |  99  |  26<H>  ----------------------------<  103<H>  4.7   |  27  |  4.1<HH>    Ca    8.7      08 Apr 2024 08:24  Phos  2.5     04-08  Mg     2.4     04-08    TPro  5.6<L>  /  Alb  3.1<L>  /  TBili  0.3  /  DBili  x   /  AST  42<H>  /  ALT  19  /  AlkPhos  139<H>  04-08      Urinalysis Basic - ( 08 Apr 2024 08:24 )    Color: x / Appearance: x / SG: x / pH: x  Gluc: 103 mg/dL / Ketone: x  / Bili: x / Urobili: x   Blood: x / Protein: x / Nitrite: x   Leuk Esterase: x / RBC: x / WBC x   Sq Epi: x / Non Sq Epi: x / Bacteria: x                RADIOLOGY:      PHYSICAL EXAM:  CONSTITUTIONAL: No acute distress, AAOx3  HEAD: Atraumatic, normocephalic  EYES: EOM intact, PERRLA, conjunctiva and sclera clear  ENT: moist mucous membranes  PULMONARY: Clear to auscultation bilaterally  CARDIOVASCULAR: Regular rate and rhythm  GASTROINTESTINAL: Soft, non-tender, non-distended; bowel sounds present  MUSCULOSKELETAL: no edema  NEUROLOGY: non-focal  SKIN: warm and dry

## 2024-04-10 LAB
ALBUMIN SERPL ELPH-MCNC: 3.1 G/DL — LOW (ref 3.5–5.2)
ALP SERPL-CCNC: 148 U/L — HIGH (ref 30–115)
ALT FLD-CCNC: 26 U/L — SIGNIFICANT CHANGE UP (ref 0–41)
ANION GAP SERPL CALC-SCNC: 9 MMOL/L — SIGNIFICANT CHANGE UP (ref 7–14)
AST SERPL-CCNC: 57 U/L — HIGH (ref 0–41)
BILIRUB SERPL-MCNC: 0.3 MG/DL — SIGNIFICANT CHANGE UP (ref 0.2–1.2)
BUN SERPL-MCNC: 18 MG/DL — SIGNIFICANT CHANGE UP (ref 10–20)
CALCIUM SERPL-MCNC: 8.4 MG/DL — SIGNIFICANT CHANGE UP (ref 8.4–10.5)
CHLORIDE SERPL-SCNC: 97 MMOL/L — LOW (ref 98–110)
CO2 SERPL-SCNC: 29 MMOL/L — SIGNIFICANT CHANGE UP (ref 17–32)
CREAT SERPL-MCNC: 3.5 MG/DL — HIGH (ref 0.7–1.5)
EGFR: 12 ML/MIN/1.73M2 — LOW
GLUCOSE SERPL-MCNC: 105 MG/DL — HIGH (ref 70–99)
HCT VFR BLD CALC: 29.4 % — LOW (ref 37–47)
HGB BLD-MCNC: 8.9 G/DL — LOW (ref 12–16)
MAGNESIUM SERPL-MCNC: 2 MG/DL — SIGNIFICANT CHANGE UP (ref 1.8–2.4)
MCHC RBC-ENTMCNC: 28 PG — SIGNIFICANT CHANGE UP (ref 27–31)
MCHC RBC-ENTMCNC: 30.3 G/DL — LOW (ref 32–37)
MCV RBC AUTO: 92.5 FL — SIGNIFICANT CHANGE UP (ref 81–99)
NRBC # BLD: 0 /100 WBCS — SIGNIFICANT CHANGE UP (ref 0–0)
PHOSPHATE SERPL-MCNC: 2.5 MG/DL — SIGNIFICANT CHANGE UP (ref 2.1–4.9)
PLATELET # BLD AUTO: 310 K/UL — SIGNIFICANT CHANGE UP (ref 130–400)
PMV BLD: 9.8 FL — SIGNIFICANT CHANGE UP (ref 7.4–10.4)
POTASSIUM SERPL-MCNC: 4.4 MMOL/L — SIGNIFICANT CHANGE UP (ref 3.5–5)
POTASSIUM SERPL-SCNC: 4.4 MMOL/L — SIGNIFICANT CHANGE UP (ref 3.5–5)
PROT SERPL-MCNC: 5.8 G/DL — LOW (ref 6–8)
RBC # BLD: 3.18 M/UL — LOW (ref 4.2–5.4)
RBC # FLD: 13.5 % — SIGNIFICANT CHANGE UP (ref 11.5–14.5)
SODIUM SERPL-SCNC: 135 MMOL/L — SIGNIFICANT CHANGE UP (ref 135–146)
WBC # BLD: 6.94 K/UL — SIGNIFICANT CHANGE UP (ref 4.8–10.8)
WBC # FLD AUTO: 6.94 K/UL — SIGNIFICANT CHANGE UP (ref 4.8–10.8)

## 2024-04-10 PROCEDURE — 99232 SBSQ HOSP IP/OBS MODERATE 35: CPT

## 2024-04-10 RX ORDER — CHLORHEXIDINE GLUCONATE 213 G/1000ML
1 SOLUTION TOPICAL
Refills: 0 | Status: DISCONTINUED | OUTPATIENT
Start: 2024-04-10 | End: 2024-04-15

## 2024-04-10 RX ORDER — SENNA PLUS 8.6 MG/1
2 TABLET ORAL AT BEDTIME
Refills: 0 | Status: DISCONTINUED | OUTPATIENT
Start: 2024-04-10 | End: 2024-04-12

## 2024-04-10 RX ORDER — FAMOTIDINE 10 MG/ML
20 INJECTION INTRAVENOUS ONCE
Refills: 0 | Status: COMPLETED | OUTPATIENT
Start: 2024-04-10 | End: 2024-04-10

## 2024-04-10 RX ORDER — POLYETHYLENE GLYCOL 3350 17 G/17G
17 POWDER, FOR SOLUTION ORAL
Refills: 0 | Status: DISCONTINUED | OUTPATIENT
Start: 2024-04-10 | End: 2024-04-12

## 2024-04-10 RX ADMIN — CALCITRIOL 0.5 MICROGRAM(S): 0.5 CAPSULE ORAL at 17:03

## 2024-04-10 RX ADMIN — PANTOPRAZOLE SODIUM 40 MILLIGRAM(S): 20 TABLET, DELAYED RELEASE ORAL at 11:13

## 2024-04-10 RX ADMIN — Medication 1 MILLIGRAM(S): at 11:09

## 2024-04-10 RX ADMIN — CALCITRIOL 0.5 MICROGRAM(S): 0.5 CAPSULE ORAL at 05:56

## 2024-04-10 RX ADMIN — SERTRALINE 25 MILLIGRAM(S): 25 TABLET, FILM COATED ORAL at 11:09

## 2024-04-10 RX ADMIN — APIXABAN 2.5 MILLIGRAM(S): 2.5 TABLET, FILM COATED ORAL at 17:03

## 2024-04-10 RX ADMIN — Medication 50 MILLIGRAM(S): at 05:57

## 2024-04-10 RX ADMIN — CHLORHEXIDINE GLUCONATE 1 APPLICATION(S): 213 SOLUTION TOPICAL at 11:14

## 2024-04-10 RX ADMIN — FAMOTIDINE 20 MILLIGRAM(S): 10 INJECTION INTRAVENOUS at 11:10

## 2024-04-10 RX ADMIN — SENNA PLUS 2 TABLET(S): 8.6 TABLET ORAL at 21:00

## 2024-04-10 RX ADMIN — ATORVASTATIN CALCIUM 80 MILLIGRAM(S): 80 TABLET, FILM COATED ORAL at 21:01

## 2024-04-10 RX ADMIN — Medication 325 MILLIGRAM(S): at 11:09

## 2024-04-10 RX ADMIN — APIXABAN 2.5 MILLIGRAM(S): 2.5 TABLET, FILM COATED ORAL at 05:56

## 2024-04-10 NOTE — PROGRESS NOTE ADULT - ASSESSMENT
89yo  female patient (Denominational) with hx of HTN, DVT/PE 2018, NICM - HFrEF(LVEF 40-45% in 9/22) s/p AICD Medtronic, RA, CKD stage 5 recently admitted 3/22  presented for syncope.       #Syncope   - possible 2/2 dehydration vs BP induced, r/o orthostatic or cardiac etiology, unlikely neurologic  - ed vitals normal. hb 10.3 at baseline, trop 96-->91, bun/crea 17/2.6 ( patient on HD)  - cth: No acute intracranial pathology. Stable exam.  - ct angio neck with iv ct: Focal irregular moderate stenosis at the distal V2/proximal V3 segments of the right vertebral artery.   - Focal moderate stenosis of the proximal intracranial segment of the right vertebral artery.  - ct abd pelv with iv ct: No evidence of acute abdominopelvic pathology.  - orthostatic - incomplete but negative from supine to sitting up   - neuroendovascular consult and neurochecks: No acute neuroendovascular intervention is warranted at this time for the finding of right VA moderate stenosis on CTA.  May follow up outpatient with Dr. Bernstein in the neurology clinic for continued monitoring.[id number] Neuroendovascular with additional questions/ concerns on this admission.  - TTE  - 1. Mildly decreased global left ventricular systolic function with a   biplane EF of 45%. Mild (Grade I) diastolic dysfunction. Severe   concentric left ventricular hypertrophy.   2. Normal right ventricular size and function.   3. Severely enlarged left atrium.   4. Degenerative mitral valve with thickening and calcification of the   anterior and posterior mitral valve leaflets and mitral annular   calcification.   5. Mild mitral valve regurgitation.   6. Sclerotic aortic valve with normal opening.   7. Mild-moderate tricuspid regurgitation.   8. Estimated pulmonary artery systolic pressure is 40.0 mmHg assuming a   right atrial pressure of 3 mmHg, which is consistent with mild pulmonary   hypertension.   9. There is no evidence of pericardial effusion.  - EP for ICD interrogation (medtronic) - no events  - r/o infx source: cxr, RVP - neg, UA - to be collected  - PT, repeat orthostatics, lisa stockings   - monitor tele - no events    #Constipation  senna/ miralax    #Dyspepsia  on protonix  pepcid 20 once    #CKD stage 5 newly started on HD  - follows with Dr Urrutia  - non oliguric  - S/p TDC  - daily phos  - outpatient dialysis spot at [address]   - nephro consult       - HD 4/9    # anemia of CKD  - sat 22% Ferritin 700, may receive VEnofer 200 mg IV x 2 doses    - patient is Denominational, will not accept blood products      #NICM - HFrEF(LVEF 35-40% in 9/22)   #s/p AICD MEdtronic  #HTN  cardiologist: Dr Mckinney  TTE at [facility] 3/2024: EF 45-50%, GIIDD  cw home : cw home amlodipine, toprol,   hold home imdur, entresto and lasix until orthostatic hypotension is ruled out    # DVT/PE 2018 (thought provoked by travel) completed 6m of AC then 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  cw home eliquis 2.5 mg bid    #RA: not on home meds      #MISC  - DVT PPx: eliquis 2.5 mg bid  - GI PPx: protonix 40mg po qd   - Diet: regular dash fluid restriction  - Activity: iat  - Labs: ordered  - Code: DNR DNI with NIV trial ([facility] 3/28) reconfirmed 4/7/2024   - Dispo: tele  pending - orthostatics repeat - lisa stockings, PT

## 2024-04-10 NOTE — PROGRESS NOTE ADULT - ATTENDING COMMENTS
***My note supersedes any discrepancies that may be above in the resident's note***    87yo  female patient (Mormon) with hx of HTN, DVT/PE 2018, NICM - HFrEF(LVEF 40-45% in 9/22) s/p AICD Medtronic, RA, CKD stage 5 recently admitted 3/22  presented for syncope.       #Syncope   - possible 2/2 dehydration vs BP induced, r/o orthostatic or cardiac etiology, unlikely neurologic  - ed vitals normal. hb 10.3 at baseline, trop 96-->91, bun/crea 17/2.6 ( patient on HD)  - cth: No acute intracranial pathology. Stable exam.  - ct angio neck with iv ct: Focal irregular moderate stenosis at the distal V2/proximal V3 segments of the right vertebral artery.   - Focal moderate stenosis of the proximal intracranial segment of the right vertebral artery.  - ct abd pelv with iv ct: No evidence of acute abdominopelvic pathology.  - orthostatic - incomplete but negative from supine to sitting up   - neuroendovascular consult and neurochecks: No acute neuroendovascular intervention is warranted at this time for the finding of right VA moderate stenosis on CTA.  May follow up outpatient with Dr. Bernstein in the neurology clinic for continued monitoring.[id number] Neuroendovascular with additional questions/ concerns on this admission.  - TTE : Mildly decreased global left ventricular systolic function with a biplane EF of 45%. Mild (Grade I) diastolic dysfunction. Severe   concentric left ventricular hypertrophy. Otherwise normal.   - EP for ICD interrogation (medtronic): WNL.  - r/o infx source: cxr, UA , RVP NEGATIVe.   - monitor tele   - PT : refused on 4/9, but got PT eval on 4/10: Minimum assist Rolling walker 25 ft. Could not safely do stairs (max assist, 1 step).   Repeat Orthostatics negative. c/w Tio stockings.   Family refusing STR. PLan for Home w/ VNS on 4/11. Also has Home health aides.       #CKD stage 5 newly started on HD  - follows with Dr Urrutia  - non oliguric  - S/p TDC  - daily phos  - outpatient dialysis spot at [address]   - nephro consult       - HD tomorrow    # anemia of CKD  - sat 22% Ferritin 700, may receive VEnofer 200 mg IV x 2 doses    - patient is Mormon, will not accept blood products      #NICM - HFrEF(LVEF 35-40% in 9/22)   #s/p AICD MEdtronic  #HTN  cardiologist: Dr Mckinney  TTE at [facility] 3/2024: EF 45-50%, GIIDD  cw home : cw home amlodipine, toprol,   Held home imdur, entresto and lasix on admission. On 4/10, repeat orthostatic hypotension negative.   f/u BP with HD on 4/11 and check with nephro if any of those meds can be gradually re-introduced?     # DVT/PE 2018 (thought provoked by travel) completed 6m of AC then 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  cw home eliquis 2.5 mg bid    #RA: not on home meds      #MISC  - DVT PPx: eliquis 2.5 mg bid  - GI PPx: protonix 40mg po qd   - Diet: regular dash fluid restriction  - Activity: iat  - Labs: ordered  - Code: DNR DNI with NIV trial ([facility] 3/28) reconfirmed today 4/7/2024   - Dispo: tele    - Medrec: confirmed    #Progress Note Handoff  Family refusing STR. PLan for Home w/ VNS on 4/11. Also has Home health aides.   HD tomorrow then discharge.

## 2024-04-10 NOTE — PHYSICAL THERAPY INITIAL EVALUATION ADULT - NSPTDISCHREC_GEN_A_CORE
Jeremy Juan declining option for sub-acute rehab. Discharge home depending on improvement of strength/endurance and availability of supervision at home.

## 2024-04-10 NOTE — PROGRESS NOTE ADULT - SUBJECTIVE AND OBJECTIVE BOX
24H events:    Patient is a 88y old Female who presents with a chief complaint of syncope (09 Apr 2024 08:18)    Primary diagnosis of Syncope      Today is 3d of hospitalization. This morning patient was seen and examined at bedside, resting comfortably in bed.    No acute or major events overnight.   orthostatic positive yesterday, didn't work with PT yesterday - was not feeling up to it  some constipation this AM, had bm yesterday but only small amount  mild gas-like chest pain to flank overnight - 1/10        PAST MEDICAL & SURGICAL HISTORY  Hypertension    Gout    Diverticulitis  sigmoid    Rheumatoid arthritis    DVT, lower extremity  Bilateral    Pulmonary embolism    Chronic HFrEF (heart failure with reduced ejection fraction)    AICD (automatic cardioverter/defibrillator) present    ESRD on dialysis    Artificial pacemaker    History of appendectomy    History of tonsillectomy    History of hysterectomy    H/O hernia repair      ALLERGIES:  cephalosporins (Angioedema)  Keflex (Swelling)    MEDICATIONS:  STANDING MEDICATIONS  apixaban 2.5 milliGRAM(s) Oral every 12 hours  atorvastatin 80 milliGRAM(s) Oral at bedtime  calcitriol   Capsule 0.5 MICROGram(s) Oral every 12 hours  chlorhexidine 2% Cloths 1 Application(s) Topical <User Schedule>  darbepoetin Injectable Syringe 25 MICROGram(s) IV Push <User Schedule>  famotidine    Tablet 20 milliGRAM(s) Oral once  ferrous    sulfate 325 milliGRAM(s) Oral <User Schedule>  folic acid 1 milliGRAM(s) Oral daily  metoprolol succinate ER 50 milliGRAM(s) Oral daily  pantoprazole   Suspension 40 milliGRAM(s) Oral daily  polyethylene glycol 3350 17 Gram(s) Oral two times a day  senna 2 Tablet(s) Oral at bedtime  sertraline 25 milliGRAM(s) Oral daily    PRN MEDICATIONS  acetaminophen     Tablet .. 650 milliGRAM(s) Oral every 6 hours PRN  albuterol    90 MICROgram(s) HFA Inhaler 2 Puff(s) Inhalation every 6 hours PRN    VITALS:   T(F): 99.5  HR: 78  BP: 125/64  RR: 18  SpO2: 97%    PHYSICAL EXAM:  GENERAL:   ( x) NAD, lying in bed comfortably     (  ) obtunded     (  ) lethargic     (  ) somnolent    HEAD:   ( x) Atraumatic     (  ) hematoma     (  ) laceration (specify location:       )     NECK:  (x) Supple     (  ) neck stiffness     (  ) nuchal rigidity     (  )  no JVD     (  ) JVD present ( -- cm)    HEART:  Rate -->     (x) normal rate     (  ) bradycardic     (  ) tachycardic  Rhythm -->     (x) regular     (  ) regularly irregular     (  ) irregularly irregular  Murmurs -->     (x) normal s1s2     (  ) systolic murmur     (  ) diastolic murmur     (  ) continuous murmur      (  ) S3 present     (  ) S4 present    LUNGS:   ( x)Unlabored respirations     (  ) tachypnea  ( x) B/L air entry     (  ) decreased breath sounds in:  (location     )    ( x) no adventitious sound     (  ) crackles     (  ) wheezing      (  ) rhonchi      (specify location:       )  (  ) chest wall tenderness (specify location:       )    ABDOMEN:   ( x) Soft     (  ) tense   |   (X  ) nondistended     (  ) distended   |   ( X ) +BS     (  ) hypoactive bowel sounds     (  ) hyperactive bowel sounds  ( x) nontender     (  ) RUQ tenderness     (  ) RLQ tenderness     (  ) LLQ tenderness     (  ) epigastric tenderness     (  ) diffuse tenderness  (  ) Splenomegaly      (  ) Hepatomegaly      (  ) Jaundice     (  ) ecchymosis     EXTREMITIES:  ( x) Normal     (  ) Rash     (  ) ecchymosis     (  ) varicose veins      (  ) pitting edema     (  ) non-pitting edema   (  ) ulceration     (  ) gangrene:     (location:     )    NERVOUS SYSTEM:    ( x) A&Ox3     (  ) confused     (  ) lethargic    LABS:                        8.9    6.94  )-----------( 310      ( 10 Apr 2024 05:36 )             29.4     04-10    135  |  97<L>  |  18  ----------------------------<  105<H>  4.4   |  29  |  3.5<H>    Ca    8.4      10 Apr 2024 05:36  Phos  2.5     04-10  Mg     2.0     04-10    TPro  5.8<L>  /  Alb  3.1<L>  /  TBili  0.3  /  DBili  x   /  AST  57<H>  /  ALT  26  /  AlkPhos  148<H>  04-10      Urinalysis Basic - ( 10 Apr 2024 05:36 )    Color: x / Appearance: x / SG: x / pH: x  Gluc: 105 mg/dL / Ketone: x  / Bili: x / Urobili: x   Blood: x / Protein: x / Nitrite: x   Leuk Esterase: x / RBC: x / WBC x   Sq Epi: x / Non Sq Epi: x / Bacteria: x                RADIOLOGY:

## 2024-04-10 NOTE — PROGRESS NOTE ADULT - ASSESSMENT
Refill policies:    • Allow 2-3 business days for refills; controlled substances may take longer.   • Contact your pharmacy at least 5 days prior to running out of medication and have them send an electronic request or submit request through the “request re Depending on your insurance carrier, approval may take 3-10 days. It is highly recommended patients contact their insurance carrier directly to determine coverage.   If test is done without insurance authorization, patient may be responsible for the entire 89yo  female patient (Baptism) with hx of HTN, DVT/PE 2018 (thought provoked by travel) completed 6m of AC then 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, NICM - HFrEF(LVEF 40-45% in 9/22) s/p AICD Medtronic, RA, ESRD on HD recently admitted 3/22 and discharged yesterday, for fluctuating mental status and abdominal pain found to have PNA, enteritis and pyelo complicated by OMERO and CKD, TDC placed by IR 4/1 and RRT done 4/1 and 4/2, discharged with o/p HD arranged.  Presented yesterday for syncope. Nephrology will be consulted for ESRD on HD.    ESRD on HD/ Syncope    - s/p HD yesterday, next HD tomorrow  - Blood pressure on the low side / might need midodrine before HD if remains low   - h/h noted, cont aranesp. 4/4 iron stores noted, needs venofer qHD  - ph on the low side / no binders    - C/w home calcitriol dose   will follow

## 2024-04-10 NOTE — PROGRESS NOTE ADULT - SUBJECTIVE AND OBJECTIVE BOX
Nephrology Progress Note    RACHEL SUMMERS  MRN-[id number]  88y  Female    S:  Patient is seen and examined, events over the last 24h noted.    O:  Allergies:  cephalosporins (Angioedema)  Keflex (Swelling)    Hospital Medications:   MEDICATIONS  (STANDING):  apixaban 2.5 milliGRAM(s) Oral every 12 hours  atorvastatin 80 milliGRAM(s) Oral at bedtime  calcitriol   Capsule 0.5 MICROGram(s) Oral every 12 hours  chlorhexidine 2% Cloths 1 Application(s) Topical <User Schedule>  darbepoetin Injectable Syringe 25 MICROGram(s) IV Push <User Schedule>  famotidine    Tablet 20 milliGRAM(s) Oral once  ferrous    sulfate 325 milliGRAM(s) Oral <User Schedule>  folic acid 1 milliGRAM(s) Oral daily  metoprolol succinate ER 50 milliGRAM(s) Oral daily  pantoprazole   Suspension 40 milliGRAM(s) Oral daily  polyethylene glycol 3350 17 Gram(s) Oral two times a day  senna 2 Tablet(s) Oral at bedtime  sertraline 25 milliGRAM(s) Oral daily    MEDICATIONS  (PRN):  acetaminophen     Tablet .. 650 milliGRAM(s) Oral every 6 hours PRN Temp greater or equal to 38C (100.4F)  albuterol    90 MICROgram(s) HFA Inhaler 2 Puff(s) Inhalation every 6 hours PRN for shortness of breath and/or wheezing    Home Medications:  Albuterol (Eqv-Proventil HFA) 90 mcg/inh inhalation aerosol: 2 puff(s) inhaled every 6 hours as needed for  shortness of breath and/or wheezing (2024 14:38)  apixaban 2.5 mg oral tablet: 1 tab(s) orally 2 times a day (2024 14:38)  atorvastatin 80 mg oral tablet: 1 tab(s) orally once a day (at bedtime) (2024 14:38)  calcitriol 0.5 mcg oral capsule: 1 cap(s) orally 2 times a day (2024 14:38)  Entresto 24 mg-26 mg oral tablet: 1 tab(s) orally 2 times a day (2024 14:38)  ferrous sulfate 325 mg (65 mg elemental iron) oral tablet: 1 tab(s) orally every other day (2024 14:38)  folic acid 1 mg oral tablet: 1 tab(s) orally once a day (2024 14:38)  furosemide 40 mg oral tablet: 1 tab(s) orally once a day (2024 14:38)  Imdur 30 mg oral tablet, extended release: 1 tab(s) orally once a day (2024 14:38)  latanoprost 0.005% ophthalmic solution: 1 drop(s) in each eye 2 times a day (2024 14:38)  metoprolol succinate 50 mg oral capsule, extended release: 1 cap(s) orally once a day (2024 14:38)  olopatadine 0.2% ophthalmic solution: 1 drop(s) in each affected eye once a day (2024 14:38)  sertraline 25 mg oral tablet: 1 tab(s) orally once a day (2024 14:38)  Timolol Maleate, Ophthalmic 0.5% preservative-free ophthalmic solution: 1 drop(s) to both eyes 2 times a day (2024 14:38)      VITALS:  Daily     Daily Weight in k (2024 20:10)  T(F): 99.5 (04-10-24 @ 04:25), Max: 99.5 (04-10-24 @ 04:25)  HR: 78 (04-10-24 @ 04:25)  BP: 125/64 (04-10-24 @ 04:25)  RR: 18 (04-10-24 @ 04:25)  SpO2: 97% (24 @ 20:10)  Wt(kg): --  I&O's Detail    2024 07:  -  10 Apr 2024 07:00  --------------------------------------------------------  IN:    Oral Fluid: 320 mL  Total IN: 320 mL    OUT:    Other (mL): 2000 mL    Voided (mL): 300 mL  Total OUT: 2300 mL    Total NET: -1980 mL        I&O's Summary    2024 07:01  -  10 Apr 2024 07:00  --------------------------------------------------------  IN: 320 mL / OUT: 2300 mL / NET: -1980 mL          PHYSICAL EXAM:  Gen: NAD  Chest: b/l breath sounds  Abd: soft  Extremities: no edema  Vascular access:       LABS:    Blood Gas Profile w/Lytes - Venous: Performed in Lab (24 @ 00:47)  Blood Gas Venous - Sodium: 123 mmol/L (24 @ 00:47)  Blood Gas Venous - Potassium: 4.9 mmol/L (24 @ 00:47)    04-10    135  |  97<L>  |  18  ----------------------------<  105<H>  4.4   |  29  |  3.5<H>    Ca    8.4      10 Apr 2024 05:36  Phos  2.5     04-10  Mg     2.0     04-10    TPro  5.8<L>  /  Alb  3.1<L>  /  TBili  0.3  /  DBili      /  AST  57<H>  /  ALT  26  /  AlkPhos  148<H>  04-10    eGFR: 12 mL/min/1.73m2 (04-10-24 @ 05:36)  eGFR: 8 mL/min/1.73m2 (24 @ 05:43)    Phosphorus: 2.5 mg/dL (04-10-24 @ 05:36)  Phosphorus: 2.8 mg/dL (24 @ 05:43)    Vitamin D, 1, 25-Dihydroxy: 60.7 pg/mL (24 @ 05:43)  Vitamin D, 25-Hydroxy: 11 ng/mL (24 @ 05:43)                          8.9    6.94  )-----------( 310      ( 10 Apr 2024 05:36 )             29.4     Mean Cell Volume: 92.5 fL (04-10-24 @ 05:36)    Iron Total: 33 ug/dL (24 @ 06:44)  % Saturation, Iron: 22 % (24 @ 06:44)      % Saturation, Iron: 22 % (24 @ 06:44)    Creatinine trend:  Creatinine: 3.5 mg/dL (04-10-24 @ 05:36)  Creatinine: 5.0 mg/dL (24 @ 05:43)  Creatinine: 4.1 mg/dL (24 @ 08:24)  Creatinine: 3.4 mg/dL (24 @ 17:49)  Creatinine: 2.6 mg/dL (24 @ 23:03)  Creatinine: 3.4 mg/dL (24 @ 07:15) Nephrology Progress Note    RACHEL SUMMERS  MRN-[id number]  88y  Female    S:  Patient is seen and examined, events over the last 24h noted.    O:  Allergies:  cephalosporins (Angioedema)  Keflex (Swelling)    Hospital Medications:   MEDICATIONS  (STANDING):  apixaban 2.5 milliGRAM(s) Oral every 12 hours  atorvastatin 80 milliGRAM(s) Oral at bedtime  calcitriol   Capsule 0.5 MICROGram(s) Oral every 12 hours  chlorhexidine 2% Cloths 1 Application(s) Topical <User Schedule>  darbepoetin Injectable Syringe 25 MICROGram(s) IV Push <User Schedule>  famotidine    Tablet 20 milliGRAM(s) Oral once  ferrous    sulfate 325 milliGRAM(s) Oral <User Schedule>  folic acid 1 milliGRAM(s) Oral daily  metoprolol succinate ER 50 milliGRAM(s) Oral daily  pantoprazole   Suspension 40 milliGRAM(s) Oral daily  polyethylene glycol 3350 17 Gram(s) Oral two times a day  senna 2 Tablet(s) Oral at bedtime  sertraline 25 milliGRAM(s) Oral daily    MEDICATIONS  (PRN):  acetaminophen     Tablet .. 650 milliGRAM(s) Oral every 6 hours PRN Temp greater or equal to 38C (100.4F)  albuterol    90 MICROgram(s) HFA Inhaler 2 Puff(s) Inhalation every 6 hours PRN for shortness of breath and/or wheezing    Home Medications:  Albuterol (Eqv-Proventil HFA) 90 mcg/inh inhalation aerosol: 2 puff(s) inhaled every 6 hours as needed for  shortness of breath and/or wheezing (2024 14:38)  apixaban 2.5 mg oral tablet: 1 tab(s) orally 2 times a day (2024 14:38)  atorvastatin 80 mg oral tablet: 1 tab(s) orally once a day (at bedtime) (2024 14:38)  calcitriol 0.5 mcg oral capsule: 1 cap(s) orally 2 times a day (2024 14:38)  Entresto 24 mg-26 mg oral tablet: 1 tab(s) orally 2 times a day (2024 14:38)  ferrous sulfate 325 mg (65 mg elemental iron) oral tablet: 1 tab(s) orally every other day (2024 14:38)  folic acid 1 mg oral tablet: 1 tab(s) orally once a day (2024 14:38)  furosemide 40 mg oral tablet: 1 tab(s) orally once a day (2024 14:38)  Imdur 30 mg oral tablet, extended release: 1 tab(s) orally once a day (2024 14:38)  latanoprost 0.005% ophthalmic solution: 1 drop(s) in each eye 2 times a day (2024 14:38)  metoprolol succinate 50 mg oral capsule, extended release: 1 cap(s) orally once a day (2024 14:38)  olopatadine 0.2% ophthalmic solution: 1 drop(s) in each affected eye once a day (2024 14:38)  sertraline 25 mg oral tablet: 1 tab(s) orally once a day (2024 14:38)  Timolol Maleate, Ophthalmic 0.5% preservative-free ophthalmic solution: 1 drop(s) to both eyes 2 times a day (2024 14:38)      VITALS:  Daily     Daily Weight in k (2024 20:10)  T(F): 99.5 (04-10-24 @ 04:25), Max: 99.5 (04-10-24 @ 04:25)  HR: 78 (04-10-24 @ 04:25)  BP: 125/64 (04-10-24 @ 04:25)  RR: 18 (04-10-24 @ 04:25)  SpO2: 97% (24 @ 20:10)  Wt(kg): --  I&O's Detail    2024 07:  -  10 Apr 2024 07:00  --------------------------------------------------------  IN:    Oral Fluid: 320 mL  Total IN: 320 mL    OUT:    Other (mL): 2000 mL    Voided (mL): 300 mL  Total OUT: 2300 mL    Total NET: -1980 mL        I&O's Summary    2024 07:01  -  10 Apr 2024 07:00  --------------------------------------------------------  IN: 320 mL / OUT: 2300 mL / NET: -1980 mL          PHYSICAL EXAM:  Gen: NAD  Chest: b/l breath sounds  Abd: soft  Vascular access: TDC      LABS:    04-10    135  |  97<L>  |  18  ----------------------------<  105<H>  4.4   |  29  |  3.5<H>    Ca    8.4      10 Apr 2024 05:36  Phos  2.5     04-10  Mg     2.0     04-10    TPro  5.8<L>  /  Alb  3.1<L>  /  TBili  0.3  /  DBili      /  AST  57<H>  /  ALT  26  /  AlkPhos  148<H>  04-10    Phosphorus: 2.5 mg/dL (04-10-24 @ 05:36)  Phosphorus: 2.8 mg/dL (24 @ 05:43)                          8.9    6.94  )-----------( 310      ( 10 Apr 2024 05:36 )             29.4     Mean Cell Volume: 92.5 fL (04-10-24 @ 05:36)    % Saturation, Iron: 22 % (24 @ 06:44)  Ferritin: 785 ng/mL (24 @ 06:44)      Creatinine trend:  Creatinine: 3.5 mg/dL (04-10-24 @ 05:36)  Creatinine: 5.0 mg/dL (24 @ 05:43)  Creatinine: 4.1 mg/dL (24 @ 08:24)  Creatinine: 3.4 mg/dL (24 @ 17:49)  Creatinine: 2.6 mg/dL (24 @ 23:03)  Creatinine: 3.4 mg/dL (24 @ 07:15)

## 2024-04-10 NOTE — PHYSICAL THERAPY INITIAL EVALUATION ADULT - ADDITIONAL COMMENTS
Son Drake reported PLOF was walking with a rollator. 3 stairs to enter house. 2 person assist to climb stairs.

## 2024-04-10 NOTE — PHYSICAL THERAPY INITIAL EVALUATION ADULT - GENERAL OBSERVATIONS, REHAB EVAL
[id number] Pt received and left semifowlers in bed, pt lethargic, deferred complete participation despite max encouragement.  /73, unable to perform orthostatic assessment. A&Ox3. Pt educated on importance of OOB mobility, POC and role of PT, continued to defer. PT to f/u at next available session.
Son Drake present to observe mobility and provide social history. PT IE 8:30-9:30am. Patient left in supine in NAD. +call bell, +bed alarm, +telemetry, +prima fit. Increased time to test for functional mobility due to needs for hygiene care (bowel movement during session). PCA Hui present to help PT as needed.

## 2024-04-11 ENCOUNTER — TRANSCRIPTION ENCOUNTER (OUTPATIENT)
Age: 89
End: 2024-04-11

## 2024-04-11 DIAGNOSIS — N17.9 ACUTE KIDNEY FAILURE, UNSPECIFIED: ICD-10-CM

## 2024-04-11 DIAGNOSIS — M06.9 RHEUMATOID ARTHRITIS, UNSPECIFIED: ICD-10-CM

## 2024-04-11 DIAGNOSIS — Z95.0 PRESENCE OF CARDIAC PACEMAKER: ICD-10-CM

## 2024-04-11 DIAGNOSIS — N30.90 CYSTITIS, UNSPECIFIED WITHOUT HEMATURIA: ICD-10-CM

## 2024-04-11 DIAGNOSIS — Z66 DO NOT RESUSCITATE: ICD-10-CM

## 2024-04-11 DIAGNOSIS — F32.9 MAJOR DEPRESSIVE DISORDER, SINGLE EPISODE, UNSPECIFIED: ICD-10-CM

## 2024-04-11 DIAGNOSIS — N18.5 CHRONIC KIDNEY DISEASE, STAGE 5: ICD-10-CM

## 2024-04-11 DIAGNOSIS — M10.9 GOUT, UNSPECIFIED: ICD-10-CM

## 2024-04-11 DIAGNOSIS — Z86.718 PERSONAL HISTORY OF OTHER VENOUS THROMBOSIS AND EMBOLISM: ICD-10-CM

## 2024-04-11 DIAGNOSIS — E86.0 DEHYDRATION: ICD-10-CM

## 2024-04-11 DIAGNOSIS — G93.41 METABOLIC ENCEPHALOPATHY: ICD-10-CM

## 2024-04-11 DIAGNOSIS — K52.9 NONINFECTIVE GASTROENTERITIS AND COLITIS, UNSPECIFIED: ICD-10-CM

## 2024-04-11 DIAGNOSIS — I42.8 OTHER CARDIOMYOPATHIES: ICD-10-CM

## 2024-04-11 DIAGNOSIS — D63.1 ANEMIA IN CHRONIC KIDNEY DISEASE: ICD-10-CM

## 2024-04-11 DIAGNOSIS — I50.22 CHRONIC SYSTOLIC (CONGESTIVE) HEART FAILURE: ICD-10-CM

## 2024-04-11 DIAGNOSIS — F41.9 ANXIETY DISORDER, UNSPECIFIED: ICD-10-CM

## 2024-04-11 DIAGNOSIS — I13.0 HYPERTENSIVE HEART AND CHRONIC KIDNEY DISEASE WITH HEART FAILURE AND STAGE 1 THROUGH STAGE 4 CHRONIC KIDNEY DISEASE, OR UNSPECIFIED CHRONIC KIDNEY DISEASE: ICD-10-CM

## 2024-04-11 DIAGNOSIS — E87.1 HYPO-OSMOLALITY AND HYPONATREMIA: ICD-10-CM

## 2024-04-11 DIAGNOSIS — Z51.5 ENCOUNTER FOR PALLIATIVE CARE: ICD-10-CM

## 2024-04-11 LAB
ALBUMIN SERPL ELPH-MCNC: 3.2 G/DL — LOW (ref 3.5–5.2)
ALP SERPL-CCNC: 166 U/L — HIGH (ref 30–115)
ALT FLD-CCNC: 26 U/L — SIGNIFICANT CHANGE UP (ref 0–41)
ANION GAP SERPL CALC-SCNC: 15 MMOL/L — HIGH (ref 7–14)
AST SERPL-CCNC: 52 U/L — HIGH (ref 0–41)
BILIRUB SERPL-MCNC: 0.3 MG/DL — SIGNIFICANT CHANGE UP (ref 0.2–1.2)
BUN SERPL-MCNC: 23 MG/DL — HIGH (ref 10–20)
CALCIUM SERPL-MCNC: 8.3 MG/DL — LOW (ref 8.4–10.4)
CHLORIDE SERPL-SCNC: 94 MMOL/L — LOW (ref 98–110)
CO2 SERPL-SCNC: 23 MMOL/L — SIGNIFICANT CHANGE UP (ref 17–32)
CREAT SERPL-MCNC: 4.2 MG/DL — CRITICAL HIGH (ref 0.7–1.5)
EGFR: 10 ML/MIN/1.73M2 — LOW
GLUCOSE SERPL-MCNC: 87 MG/DL — SIGNIFICANT CHANGE UP (ref 70–99)
HCT VFR BLD CALC: 30.1 % — LOW (ref 37–47)
HGB BLD-MCNC: 9.1 G/DL — LOW (ref 12–16)
MAGNESIUM SERPL-MCNC: 2.1 MG/DL — SIGNIFICANT CHANGE UP (ref 1.8–2.4)
MCHC RBC-ENTMCNC: 27.7 PG — SIGNIFICANT CHANGE UP (ref 27–31)
MCHC RBC-ENTMCNC: 30.2 G/DL — LOW (ref 32–37)
MCV RBC AUTO: 91.5 FL — SIGNIFICANT CHANGE UP (ref 81–99)
NRBC # BLD: 0 /100 WBCS — SIGNIFICANT CHANGE UP (ref 0–0)
PHOSPHATE SERPL-MCNC: 2.9 MG/DL — SIGNIFICANT CHANGE UP (ref 2.1–4.9)
PLATELET # BLD AUTO: 307 K/UL — SIGNIFICANT CHANGE UP (ref 130–400)
PMV BLD: 9.6 FL — SIGNIFICANT CHANGE UP (ref 7.4–10.4)
POTASSIUM SERPL-MCNC: 4.2 MMOL/L — SIGNIFICANT CHANGE UP (ref 3.5–5)
POTASSIUM SERPL-SCNC: 4.2 MMOL/L — SIGNIFICANT CHANGE UP (ref 3.5–5)
PROT SERPL-MCNC: 6 G/DL — SIGNIFICANT CHANGE UP (ref 6–8)
RBC # BLD: 3.29 M/UL — LOW (ref 4.2–5.4)
RBC # FLD: 13.6 % — SIGNIFICANT CHANGE UP (ref 11.5–14.5)
SODIUM SERPL-SCNC: 132 MMOL/L — LOW (ref 135–146)
WBC # BLD: 7.19 K/UL — SIGNIFICANT CHANGE UP (ref 4.8–10.8)
WBC # FLD AUTO: 7.19 K/UL — SIGNIFICANT CHANGE UP (ref 4.8–10.8)

## 2024-04-11 PROCEDURE — 99233 SBSQ HOSP IP/OBS HIGH 50: CPT

## 2024-04-11 RX ORDER — FAMOTIDINE 10 MG/ML
20 INJECTION INTRAVENOUS DAILY
Refills: 0 | Status: DISCONTINUED | OUTPATIENT
Start: 2024-04-11 | End: 2024-04-11

## 2024-04-11 RX ADMIN — SERTRALINE 25 MILLIGRAM(S): 25 TABLET, FILM COATED ORAL at 11:57

## 2024-04-11 RX ADMIN — Medication 50 MILLIGRAM(S): at 05:08

## 2024-04-11 RX ADMIN — Medication 600 MILLIGRAM(S): at 21:16

## 2024-04-11 RX ADMIN — POLYETHYLENE GLYCOL 3350 17 GRAM(S): 17 POWDER, FOR SOLUTION ORAL at 05:09

## 2024-04-11 RX ADMIN — SENNA PLUS 2 TABLET(S): 8.6 TABLET ORAL at 21:17

## 2024-04-11 RX ADMIN — ATORVASTATIN CALCIUM 80 MILLIGRAM(S): 80 TABLET, FILM COATED ORAL at 21:17

## 2024-04-11 RX ADMIN — CALCITRIOL 0.5 MICROGRAM(S): 0.5 CAPSULE ORAL at 05:07

## 2024-04-11 RX ADMIN — Medication 1 MILLIGRAM(S): at 11:57

## 2024-04-11 RX ADMIN — APIXABAN 2.5 MILLIGRAM(S): 2.5 TABLET, FILM COATED ORAL at 05:08

## 2024-04-11 RX ADMIN — CHLORHEXIDINE GLUCONATE 1 APPLICATION(S): 213 SOLUTION TOPICAL at 05:09

## 2024-04-11 NOTE — PROGRESS NOTE ADULT - SUBJECTIVE AND OBJECTIVE BOX
seen and examined  24 h events noted        PAST HISTORY  --------------------------------------------------------------------------------  No significant changes to PMH, PSH, FHx, SHx, unless otherwise noted    ALLERGIES & MEDICATIONS  --------------------------------------------------------------------------------  Allergies    cephalosporins (Angioedema)  Keflex (Swelling)    Intolerances      Standing Inpatient Medications  apixaban 2.5 milliGRAM(s) Oral every 12 hours  atorvastatin 80 milliGRAM(s) Oral at bedtime  calcitriol   Capsule 0.5 MICROGram(s) Oral every 12 hours  chlorhexidine 2% Cloths 1 Application(s) Topical <User Schedule>  darbepoetin Injectable Syringe 25 MICROGram(s) IV Push <User Schedule>  ferrous    sulfate 325 milliGRAM(s) Oral <User Schedule>  folic acid 1 milliGRAM(s) Oral daily  metoprolol succinate ER 50 milliGRAM(s) Oral daily  pantoprazole   Suspension 40 milliGRAM(s) Oral daily  polyethylene glycol 3350 17 Gram(s) Oral two times a day  senna 2 Tablet(s) Oral at bedtime  sertraline 25 milliGRAM(s) Oral daily    PRN Inpatient Medications  acetaminophen     Tablet .. 650 milliGRAM(s) Oral every 6 hours PRN  albuterol    90 MICROgram(s) HFA Inhaler 2 Puff(s) Inhalation every 6 hours PRN        VITALS/PHYSICAL EXAM  --------------------------------------------------------------------------------  T(C): 36.5 (04-11-24 @ 05:05), Max: 36.6 (04-10-24 @ 13:27)  HR: 68 (04-11-24 @ 05:05) (66 - 68)  BP: 121/65 (04-11-24 @ 05:05) (121/65 - 137/72)  RR: 19 (04-11-24 @ 05:05) (18 - 19)  SpO2: 99% (04-11-24 @ 05:05) (99% - 99%)  Wt(kg): --        04-10-24 @ 07:01  -  04-11-24 @ 07:00  --------------------------------------------------------  IN: 118 mL / OUT: 0 mL / NET: 118 mL      Physical Exam:  	Gen: NAD  	Pulm: decrease BS  B/L  	CV: S1S2; no rub  	Abd: distended  	LE:  no edema  	Vascular access:tdc     LABS/STUDIES  --------------------------------------------------------------------------------              9.1    7.19  >-----------<  307      [04-11-24 @ 05:53]              30.1     135  |  97  |  18  ----------------------------<  105      [04-10-24 @ 05:36]  4.4   |  29  |  3.5        Ca     8.4     [04-10-24 @ 05:36]      Mg     2.0     [04-10-24 @ 05:36]      Phos  2.5     [04-10-24 @ 05:36]    TPro  5.8  /  Alb  3.1  /  TBili  0.3  /  DBili  x   /  AST  57  /  ALT  26  /  AlkPhos  148  [04-10-24 @ 05:36]    Creatinine Trend:  SCr 3.5 [04-10 @ 05:36]  SCr 5.0 [04-09 @ 05:43]  SCr 4.1 [04-08 @ 08:24]  SCr 3.4 [04-07 @ 17:49]  SCr 2.6 [04-06 @ 23:03]    Urinalysis - [04-10-24 @ 05:36]      Color  / Appearance  / SG  / pH       Gluc 105 / Ketone   / Bili  / Urobili        Blood  / Protein  / Leuk Est  / Nitrite       RBC  / WBC  / Hyaline  / Gran  / Sq Epi  / Non Sq Epi  / Bacteria       Iron 33, TIBC 147, %sat 22      [04-04-24 @ 06:44]  Ferritin 785      [04-04-24 @ 06:44]  PTH -- (Ca 8.2)      [04-09-24 @ 05:43]   35  PTH -- (Ca 8.2)      [03-24-24 @ 20:00]   138  Vitamin D (25OH) 11      [04-09-24 @ 05:43]  Lipid: chol 144, , HDL 39, LDL --      [03-22-24 @ 06:14]    HBsAb Nonreact      [03-27-24 @ 15:55]  HBsAg Nonreact      [04-01-24 @ 15:52]  HBcAb Nonreact      [04-03-24 @ 16:02]  HCV 0.20, Nonreact      [04-01-24 @ 15:52]

## 2024-04-11 NOTE — PROGRESS NOTE ADULT - ASSESSMENT
87yo  female patient (Mormon) with hx of HTN, DVT/PE 2018, NICM - HFrEF(LVEF 40-45% in 9/22) s/p AICD Medtronic, RA, CKD stage 5 recently admitted 3/22  presented for syncope.     #Syncope, Possibly 2/2 Dehydration  - VS stable on admission  - Labs on admission --> Hb 10.3 (BL), Trop 96>91, BUN/Cr 17/2.6  - Imaging on admission --> CTH w/ nothing acute. CTA Neck w/ focal irregular stenosis of L VA and focal moderate stenosis of R VA. CTAP w/ nothing acute.   - Admitted to Telemetry for Syncope  - Telemetry Course: Neuroendovascular recommended OP F/U. TTE w/ EF 45%, G1DD, Sev LVH, Sev LAE, Deg MV w/ thickening, mild MV, mild-mod TR, mild PH, no effusion. EP interrogated device w/o any events. CXR and RVP negative. Orthostatics negative. No events on Tele. PT recommended STR but family refuses and Pt will be going home on 4/11. Tele DC'd and Pt transferred to .     #CKD-V, Recently Started on HD  #Anemia  - Follows with Dr. Urrutia, OP HD spot at [facility]   - Patient is Mormon, will not accept blood products  - Sat 22% Ferritin 700, may receive VEnofer 200 mg IV x 2 doses  - Telemetry Course: Nephro on board for HD, last session 4/9, next session 4/11. Held Home Imdur, Entresto and Lasix, F/U w/ Nephro regarding reintroducing these.   - 4/11: Plan for HD today.  - Pending: HD    #NICM - HFrEF(LVEF 35-40% in 9/22)   #s/p AICD MEdtronic  #HTN  - Follows OP with Dr. Mckinney  - TTE at [facility] 3/2024: EF 45-50%, GIIDD  - Home Imdur, Entresto & Lasix held in setting of syncope  - Pending: Nephro F/U regarding resuming home medications  - C/w Home Amlodipine, Toprol    #Constipation  #Dyspepsia  - C/w Protonix, Miralax, Senna    # DVT/PE  - C/w Home Eliquis 2.5mg BID    #RA  - Not on home meds    #MISC  - DVT: Eliquis 2.5mg BID  - GI: Protonix   - Diet: DASH, Fluid Restriction   89yo  female patient (Orthodoxy) with hx of HTN, DVT/PE 2018, NICM - HFrEF(LVEF 40-45% in 9/22) s/p AICD Medtronic, RA, CKD stage 5 recently admitted 3/22  presented for syncope.     #Syncope, Possibly 2/2 Dehydration  - VS stable on admission  - Labs on admission --> Hb 10.3 (BL), Trop 96>91, BUN/Cr 17/2.6  - Imaging on admission --> CTH w/ nothing acute. CTA Neck w/ focal irregular stenosis of L VA and focal moderate stenosis of R VA. CTAP w/ nothing acute.   - Admitted to Telemetry for Syncope  - Telemetry Course: Neuroendovascular recommended OP F/U. TTE w/ EF 45%, G1DD, Sev LVH, Sev LAE, Deg MV w/ thickening, mild MV, mild-mod TR, mild PH, no effusion. EP interrogated device w/o any events. CXR and RVP negative. Orthostatics positive. No events on Tele. PT recommended STR but family refuses and Pt will be going home on 4/11. Tele DC'd and Pt transferred to .     #CKD-V, Recently Started on HD  #Anemia  - Follows with Dr. Urrutia, OP HD spot at [facility]   - Patient is Orthodoxy, will not accept blood products  - Sat 22% Ferritin 700, may receive VEnofer 200 mg IV x 2 doses  - Telemetry Course: Nephro on board for HD, last session 4/9, next session 4/11. Held Home Imdur, Entresto and Lasix, F/U w/ Nephro regarding reintroducing these.   - 4/11: Plan for HD today.  - Pending: HD    #NICM - HFrEF(LVEF 35-40% in 9/22)   #s/p AICD MEdtronic  #HTN  - Follows OP with Dr. Mckinney  - TTE at [facility] 3/2024: EF 45-50%, GIIDD  - Home Imdur, Entresto & Lasix held in setting of syncope  - Pending: Nephro F/U regarding resuming home medications  - C/w Home Toprol 50mg QD    #Constipation  #Dyspepsia  - C/w Pepcid, Miralax, Senna    # DVT/PE  - C/w Home Eliquis 2.5mg BID    #RA  - Not on home meds    #MISC  - DVT: Eliquis 2.5mg BID  - GI: Pepcid  - Diet: DASH, Fluid Restriction

## 2024-04-11 NOTE — DISCHARGE NOTE NURSING/CASE MANAGEMENT/SOCIAL WORK - PATIENT PORTAL LINK FT
You can access the FollowMyHealth Patient Portal offered by [facility] by registering at the following website: [email or web address]. By joining [facility]’s FollowMyHealth portal, you will also be able to view your health information using other applications (apps) compatible with our system.

## 2024-04-11 NOTE — DISCHARGE NOTE PROVIDER - NSDCFUADDINST_GEN_ALL_CORE_FT
1500mL fluid restriction 1500mL fluid restriction  Follow up with nephrology and PCP for entresto, lasix, imdur re-introduction  - Use lisa stocking and abdominal binder

## 2024-04-11 NOTE — DISCHARGE NOTE PROVIDER - NSDCCPCAREPLAN_GEN_ALL_CORE_FT
PRINCIPAL DISCHARGE DIAGNOSIS  Diagnosis: Syncope  Assessment and Plan of Treatment: You presented to the hospital complaining of dizziness and fainting. Your symptoms seem to arise uponse change of posture. Imaging and lab workup were unremarkable. You underwent dialysis on 4/9 and 4/11. There were changes to your medications that will continue upon discharge and will be reflected on your discharge paperwork. Please take all medications as instructed and attend all scheduled hemodialysis sessions. Please follow up with your PCP and Nephrologist in 1-2 weeks. Please do not hesitate to return to the ER if your symptoms worsen.

## 2024-04-11 NOTE — DISCHARGE NOTE PROVIDER - ATTENDING DISCHARGE PHYSICAL EXAMINATION:
T(C): 36.7 (04-15-24 @ 00:42), Max: 36.7 (04-15-24 @ 00:42)  HR: 61 (04-15-24 @ 06:03) (61 - 64)  BP: 139/63 (04-15-24 @ 06:03) (132/65 - 139/63)  RR: 18 (04-15-24 @ 00:42) (18 - 18)  SpO2: --    O/E:  Awake, alert, not in distress.  HEENT: atraumatic, EOMI.  Chest: clear.  CVS: SIS2 +, no murmur.  P/A: Soft, BS+  CNS: awake, alert  Ext: no edema feet.  Skin: no rash, no ulcers.  All systems reviewed positive findings as above.

## 2024-04-11 NOTE — DISCHARGE NOTE PROVIDER - PROVIDER TOKENS
PROVIDER:[TOKEN:[[id number]],FOLLOWUP:[2 weeks]],PROVIDER:[TOKEN:[[id number]],FOLLOWUP:[2 weeks]]

## 2024-04-11 NOTE — PROGRESS NOTE ADULT - ASSESSMENT
89yo  female patient (Hoahaoism) with hx of HTN, DVT/PE 2018 (thought provoked by travel) completed 6m of AC then 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, NICM - HFrEF(LVEF 40-45% in 9/22) s/p AICD Medtronic, RA, ESRD on HD recently admitted 3/22 and discharged yesterday, for fluctuating mental status and abdominal pain found to have PNA, enteritis and pyelo complicated by OMERO and CKD, TDC placed by IR 4/1 and RRT done 4/1 and 4/2, discharged with o/p HD arranged.  Presented yesterday for syncope. Nephrology will be consulted for ESRD on HD.    ESRD on HD/ Syncope    - hd today standard bath uf 2 liters as tolerated   - Blood pressure  controlled   - h/h noted, cont aranesp.  iron stores noted, needs venofer qHD  - ph on the low side / no binders    - C/w home calcitriol dose   will follow

## 2024-04-11 NOTE — DISCHARGE NOTE PROVIDER - HOSPITAL COURSE
87yo  female patient (Worship) with hx of HTN, DVT/PE 2018, NICM - HFrEF(LVEF 40-45% in 9/22) s/p AICD Medtronic, RA, CKD stage 5 recently admitted 3/22  presented for syncope.     #Syncope, Orthostatic +  - VS stable on admission  - Labs on admission --> Hb 10.3 (BL), Trop 96>91, BUN/Cr 17/2.6  - Imaging on admission --> CTH w/ nothing acute. CTA Neck w/ focal irregular stenosis of L VA and focal moderate stenosis of R VA. CTAP w/ nothing acute.   - Admitted to Telemetry for Syncope  - Telemetry Course: Neuroendovascular recommended OP F/U. TTE w/ EF 45%, G1DD, Sev LVH, Sev LAE, Deg MV w/ thickening, mild MV, mild-mod TR, mild PH, no effusion. EP interrogated device w/o any events. CXR and RVP negative. Orthostatics positive. No events on Tele. PT recommended STR but family refuses and Pt will be going home on 4/11. Tele DC'd and Pt transferred to .     #CKD-V, Recently Started on HD  #Anemia  - Follows with Dr. Urrutia, OP HD spot at [address]   - Patient is Worship, will not accept blood products  - Sat 22% Ferritin 700, may receive VEnofer 200 mg IV x 2 doses  - Telemetry Course: Nephro on board for HD, last session 4/9, next session 4/11. Held Home Imdur, Entresto and Lasix, F/U w/ Nephro regarding reintroducing these.   - 4/11: HD.     #NICM - HFrEF(LVEF 35-40% in 9/22)   #s/p AICD MEdtronic  #HTN  - Follows OP with Dr. Mckinney  - TTE at [facility] 3/2024: EF 45-50%, GIIDD  - Home Imdur, Entresto & Lasix held in setting of syncope  - Pending: Nephro F/U regarding resuming home medications  - C/w Home Toprol 50mg QD    #Constipation  #Dyspepsia  - Continued Pepcid, Miralax, Senna    # DVT/PE  - Continued Home Eliquis 2.5mg BID    #RA  - Not on home meds    The patient is medically stable for discharge.    89yo  female patient (Mosque) with hx of HTN, DVT/PE 2018, NICM - HFrEF(LVEF 40-45% in 9/22) s/p AICD Medtronic, RA, CKD stage 5 recently admitted 3/22  presented for syncope.     #Syncope, Orthostatic +  - VS stable on admission  - Labs on admission --> Hb 10.3 (BL), Trop 96>91, BUN/Cr 17/2.6  - Imaging on admission --> CTH w/ nothing acute. CTA Neck w/ focal irregular stenosis of L VA and focal moderate stenosis of R VA. CTAP w/ nothing acute.   - Admitted to Telemetry for Syncope  - Telemetry Course: Neuroendovascular recommended OP F/U. TTE w/ EF 45%, G1DD, Sev LVH, Sev LAE, Deg MV w/ thickening, mild MV, mild-mod TR, mild PH, no effusion. EP interrogated device w/o any events. CXR and RVP negative. Orthostatics positive. No events on Tele. PT recommended STR but family refuses and Pt will be going home on 4/11. Tele DC'd and Pt transferred to .   - 4/12: Repeat orthostatics today. Plan to DC if stable.     #CKD-V, Recently Started on HD  #Anemia  - Follows with Dr. Urrutia, OP HD spot at [address]   - Patient is Mosque, will not accept blood products  - Sat 22% Ferritin 700, may receive VEnofer 200 mg IV x 2 doses  - Telemetry Course: Nephro on board for HD, last session 4/9, next session 4/11. Held Home Imdur, Entresto and Lasix, F/U w/ Nephro regarding reintroducing these.   - 4/11: S/p HD.    #NICM - HFrEF(LVEF 35-40% in 9/22)   #s/p AICD MEdtronic  #HTN  - Follows OP with Dr. Mckinney  - TTE at [facility] 3/2024: EF 45-50%, GIIDD  - Home Imdur, Entresto & Lasix held in setting of syncope  - Continued Home Toprol 50mg QD    #Constipation - resolved  #Diarrhea  #Dyspepsia  - 4/12: Pt complaining of diarrhea. Miralax and Senna DC'd.  - Continued Pepcid    # DVT/PE  - Continued Home Eliquis 2.5mg BID    #RA  - Not on home meds    The patient is medically stable for discharge.    87yo  female patient (Pentecostalism) with hx of HTN, DVT/PE 2018, NICM - HFrEF(LVEF 40-45% in 9/22) s/p AICD Medtronic, RA, CKD stage 5 recently admitted 3/22  presented for syncope.     #Syncope, Orthostatic +  - VS stable on admission  - Labs on admission --> Hb 10.3 (BL), Trop 96>91, BUN/Cr 17/2.6  - Imaging on admission --> CTH w/ nothing acute. CTA Neck w/ focal irregular stenosis of L VA and focal moderate stenosis of R VA. CTAP w/ nothing acute.   - Admitted to Telemetry for Syncope  - Telemetry Course: Neuroendovascular recommended OP F/U. TTE w/ EF 45%, G1DD, Sev LVH, Sev LAE, Deg MV w/ thickening, mild MV, mild-mod TR, mild PH, no effusion. EP interrogated device w/o any events. CXR and RVP negative. Orthostatics positive. No events on Tele. PT recommended STR but family refuses and Pt will be going home on 4/11. Tele DC'd and Pt transferred to .   - 4/15: orthostatics +. STAN stocking/abdominal binder --> orthostatics repeated    #CKD-V, Recently Started on HD  #Anemia  - Follows with Dr. Urrutia, OP HD spot at [address]   - Patient is Pentecostalism, will not accept blood products  - Sat 22% Ferritin 700, may receive VEnofer 200 mg IV x 2 doses  - Telemetry Course: Nephro on board for HD, last session 4/9, next session 4/11. Held Home Imdur, Entresto and Lasix, F/U w/ Nephro regarding reintroducing these.   - 4/11: S/p HD.    #NICM - HFrEF(LVEF 35-40% in 9/22)   #s/p AICD MEdtronic  #HTN  - Follows OP with Dr. Mckinney  - TTE at [facility] 3/2024: EF 45-50%, GIIDD  - Home Imdur, Entresto & Lasix held in setting of syncope  - Continued Home Toprol 50mg QD    #Constipation - resolved  #Diarrhea - resolved  #Dyspepsia  - 4/12: Pt complaining of diarrhea. Miralax and Senna/ iron DC'd.  - Continued Pepcid  - GI PCR neg    # DVT/PE  - Continued Home Eliquis 2.5mg BID    #RA  - Not on home meds    The patient is medically stable for discharge.    87yo  female patient ([employer]) with hx of HTN, DVT/PE 2018, NICM - HFrEF(LVEF 40-45% in 9/22) s/p AICD Medtronic, RA, CKD stage 5 recently admitted 3/22  presented for syncope.     # Diarrhea  - dc  ferrous sulphate  - lactose free diet  -  GI PCR neg    # Syncope sec to orthostatic Hypotension  # Right vertebral artery stenosis - moderate  - CT Angio Neck w/ IV Cont (04.07.24 @ 00:35) >Focal irregular moderate stenosis at the distal V2/proximal V3 segments of the right vertebral artery. Focal moderate stenosis of the proximal intracranial segment of the right vertebral artery.  - evaluated by neurology --> outpt F/u  -  TTE Echo Complete w/ Contrast w/o Doppler (04.09.24 @ 08:18) >Mildly decreased global left ventricular systolic function with a  EF of 45%. Mild (Grade I) diastolic dysfunction. Severe concentric left ventricular hypertrophy.Severely enlarged left atrium.Degenerative mitral valve with thickening and calcification of the anterior and posterior mitral valve leaflets and mitral annular calcification.Mild-moderate tricuspid regurgitation.mild pulmonary hypertension.  - AICD interrogated- no events  - 4/15: orthostatics +.  -  STAN stocking/abdominal binder   - Hold lasix, entresto, imdur  - c/w eliquis, statin    # CKD Stage 5  on HD  # Normocytic Anemia  # Hyponatremia- resolved  - aranesp and  venofer qHD  - c/w  calcitriol   - HD as per nephrology     # NICM, chr systolc and diastolic CHF S/p AICD  # Hypertension  - c/w  toprol  - Hold lasix, entresto, imdur    # H/o PE, DVT  - c/w eliquis    # DNR/ DNI/trial of NIV      The patient is medically stable for discharge.    89yo  female patient (Nondenominational) with hx of HTN, DVT/PE 2018, NICM - HFrEF(LVEF 40-45% in 9/22) s/p AICD Medtronic, RA, CKD stage 5 recently admitted 3/22  presented for syncope.     # Diarrhea  - dc  ferrous sulphate  - lactose free diet  -  GI PCR neg    # Syncope sec to orthostatic Hypotension  # Right vertebral artery stenosis - moderate  - CT Angio Neck w/ IV Cont (04.07.24 @ 00:35) >Focal irregular moderate stenosis at the distal V2/proximal V3 segments of the right vertebral artery. Focal moderate stenosis of the proximal intracranial segment of the right vertebral artery.  - evaluated by neurology --> outpt F/u  -  TTE Echo Complete w/ Contrast w/o Doppler (04.09.24 @ 08:18) >Mildly decreased global left ventricular systolic function with a  EF of 45%. Mild (Grade I) diastolic dysfunction. Severe concentric left ventricular hypertrophy.Severely enlarged left atrium.Degenerative mitral valve with thickening and calcification of the anterior and posterior mitral valve leaflets and mitral annular calcification.Mild-moderate tricuspid regurgitation.mild pulmonary hypertension.  - AICD interrogated- no events  - 4/15: orthostatics +.  -  STAN stocking/abdominal binder   - Hold lasix, entresto, imdur  - c/w eliquis, statin    # CKD Stage 5  on HD  # Normocytic Anemia  # Hyponatremia- resolved  - aranesp and  venofer qHD  - c/w  calcitriol   - HD as per nephrology     # NICM, chr systolc and diastolic CHF S/p AICD  # Hypertension  - c/w  toprol  - Hold lasix, entresto, imdur    # H/o PE, DVT  - c/w eliquis    # DNR/ DNI/trial of NIV      The patient is medically stable for discharge. Pt declined  STR

## 2024-04-11 NOTE — PROGRESS NOTE ADULT - ASSESSMENT
87yo  female patient (Religious) with hx of HTN, DVT/PE 2018 (thought provoked by travel) completed 6m of AC then 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, NICM - HFrEF(LVEF 40-45% in 9/22) s/p AICD Medtronic, RA, CKD stage 5 --> presented today for syncope.    # Syncope sec to orthostatic Hypotension  # Right vertebral artery stenosis - moderate  - CT Angio Neck w/ IV Cont (04.07.24 @ 00:35) >Focal irregular moderate stenosis at the distal V2/proximal V3 segments of the right vertebral artery. Focal moderate stenosis of the proximal intracranial segment of the right vertebral artery.  - evaluated by neurology --> outpt F/u  -  TTE Echo Complete w/ Contrast w/o Doppler (04.09.24 @ 08:18) >Mildly decreased global left ventricular systolic function with a  EF of 45%. Mild (Grade I) diastolic dysfunction. Severe concentric left ventricular hypertrophy.Severely enlarged left atrium.Degenerative mitral valve with thickening and calcification of the anterior and posterior mitral valve leaflets and mitral annular calcification.Mild-moderate tricuspid regurgitation.mild pulmonary hypertension.  - AICD interrogated- no events  - Hold lasix, entresto, imdur  - c/w eliquis, statin    # CKD Stage 5  on HD  # Normocytic Anemia  # Hyponatremia  - aranesp and  venofer qHD  - c/w  calcitriol   - HD as per nephrology     # NICM, chr systolc and diastolic CHF S/p AICD  # Hypertension  - c/w  toprol  - Hold lasix, entresto, imdur    # H/o PE, DVT  - c/w eliquis    #Constipation  - c/w miralax, Senna  - Diet: DASH, Fluid Restriction    # Full code    # Pending: monitor BP, orthostats q shift  # Disposition: Home when stable 89yo  female patient (Oriental orthodox) with hx of HTN, DVT/PE 2018 (thought provoked by travel) completed 6m of AC then 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, NICM - HFrEF(LVEF 40-45% in 9/22) s/p AICD Medtronic, RA, CKD stage 5 --> presented today for syncope.    # Syncope sec to orthostatic Hypotension  # Right vertebral artery stenosis - moderate  - CT Angio Neck w/ IV Cont (04.07.24 @ 00:35) >Focal irregular moderate stenosis at the distal V2/proximal V3 segments of the right vertebral artery. Focal moderate stenosis of the proximal intracranial segment of the right vertebral artery.  - evaluated by neurology --> outpt F/u  -  TTE Echo Complete w/ Contrast w/o Doppler (04.09.24 @ 08:18) >Mildly decreased global left ventricular systolic function with a  EF of 45%. Mild (Grade I) diastolic dysfunction. Severe concentric left ventricular hypertrophy.Severely enlarged left atrium.Degenerative mitral valve with thickening and calcification of the anterior and posterior mitral valve leaflets and mitral annular calcification.Mild-moderate tricuspid regurgitation.mild pulmonary hypertension.  - AICD interrogated- no events  - Hold lasix, entresto, imdur  - c/w eliquis, statin    # CKD Stage 5  on HD  # Normocytic Anemia  # Hyponatremia  - aranesp and  venofer qHD  - c/w  calcitriol   - HD as per nephrology     # NICM, chr systolc and diastolic CHF S/p AICD  # Hypertension  - c/w  toprol  - Hold lasix, entresto, imdur    # H/o PE, DVT  - c/w eliquis    #Constipation  - c/w miralax, Senna  - Diet: DASH, Fluid Restriction    # DNR/ DNI/trial of NIV    # Pending: monitor BP, orthostats q shift  # Disposition: Home when stable

## 2024-04-11 NOTE — PROGRESS NOTE ADULT - SUBJECTIVE AND OBJECTIVE BOX
24H events:    Patient is a 88y old Female who presents with a chief complaint of syncope (10 Apr 2024 10:14)    Primary diagnosis of Syncope      Today is hospital day 4d. This morning patient was seen and examined at bedside, resting comfortably in bed.    No acute or major events overnight.    Code Status: DNR/DNI: Trial NIV    PAST MEDICAL & SURGICAL HISTORY  Hypertension    Gout    Diverticulitis  sigmoid    Rheumatoid arthritis    DVT, lower extremity  Bilateral    Pulmonary embolism    Chronic HFrEF (heart failure with reduced ejection fraction)    AICD (automatic cardioverter/defibrillator) present    ESRD on dialysis    Artificial pacemaker    History of appendectomy    History of tonsillectomy    History of hysterectomy    H/O hernia repair      SOCIAL HISTORY:  Social History: NA    ALLERGIES:  cephalosporins (Angioedema)  Keflex (Swelling)    MEDICATIONS:  STANDING MEDICATIONS  apixaban 2.5 milliGRAM(s) Oral every 12 hours  atorvastatin 80 milliGRAM(s) Oral at bedtime  calcitriol   Capsule 0.5 MICROGram(s) Oral every 12 hours  chlorhexidine 2% Cloths 1 Application(s) Topical <User Schedule>  darbepoetin Injectable Syringe 25 MICROGram(s) IV Push <User Schedule>  ferrous    sulfate 325 milliGRAM(s) Oral <User Schedule>  folic acid 1 milliGRAM(s) Oral daily  metoprolol succinate ER 50 milliGRAM(s) Oral daily  pantoprazole   Suspension 40 milliGRAM(s) Oral daily  polyethylene glycol 3350 17 Gram(s) Oral two times a day  senna 2 Tablet(s) Oral at bedtime  sertraline 25 milliGRAM(s) Oral daily    PRN MEDICATIONS  acetaminophen     Tablet .. 650 milliGRAM(s) Oral every 6 hours PRN  albuterol    90 MICROgram(s) HFA Inhaler 2 Puff(s) Inhalation every 6 hours PRN      ROS:   no headaches, no dizziness, no fevers, no chills, no CP/SOB, no abdominal pain, no n/v/d, no hematochezia, no burning with urination, no hematuria, no weakness or tingling    I&O:  I&O's Summary    10 Apr 2024 07:01  -  11 Apr 2024 07:00  --------------------------------------------------------  IN: 118 mL / OUT: 0 mL / NET: 118 mL    VITALS:   ICU Vital Signs Last 24 Hrs  T(C): 36.5 (11 Apr 2024 05:05), Max: 36.6 (10 Apr 2024 13:27)  T(F): 97.7 (11 Apr 2024 05:05), Max: 97.9 (10 Apr 2024 13:27)  HR: 68 (11 Apr 2024 05:05) (66 - 68)  BP: 121/65 (11 Apr 2024 05:05) (121/65 - 137/72)  BP(mean): --  ABP: --  ABP(mean): --  RR: 19 (11 Apr 2024 05:05) (18 - 19)  SpO2: 99% (11 Apr 2024 05:05) (99% - 99%)    PHYSICAL EXAM:  GENERAL: lying in bed comfortably  HEAD: normal  HEART: RRR  LUNGS: CTA b/l  ABDOMEN: soft nontender nondistended  EXTREMITIES: no edema  NERVOUS SYSTEM:  A&OX3    Lines: TDC    LABS:                        8.9    6.94  )-----------( 310      ( 10 Apr 2024 05:36 )             29.4     04-10    135  |  97<L>  |  18  ----------------------------<  105<H>  4.4   |  29  |  3.5<H>    Ca    8.4      10 Apr 2024 05:36  Phos  2.5     04-10  Mg     2.0     04-10    TPro  5.8<L>  /  Alb  3.1<L>  /  TBili  0.3  /  DBili  x   /  AST  57<H>  /  ALT  26  /  AlkPhos  148<H>  04-10      Urinalysis Basic - ( 10 Apr 2024 05:36 )    Color: x / Appearance: x / SG: x / pH: x  Gluc: 105 mg/dL / Ketone: x  / Bili: x / Urobili: x   Blood: x / Protein: x / Nitrite: x   Leuk Esterase: x / RBC: x / WBC x   Sq Epi: x / Non Sq Epi: x / Bacteria: x      RADIOLOGY:    < from: Xray Chest 1 View- PORTABLE-Urgent (Xray Chest 1 View- PORTABLE-Urgent .) (04.07.24 @ 16:56) >  IMPRESSION:    No radiographic evidence of acute cardiopulmonary disease.    < end of copied text >

## 2024-04-11 NOTE — DISCHARGE NOTE PROVIDER - CARE PROVIDERS DIRECT ADDRESSES
,[email or web address]

## 2024-04-11 NOTE — DISCHARGE NOTE PROVIDER - NSDCFUSCHEDAPPT_GEN_ALL_CORE_FT
Sterling Mckinney  [facility] Physician [facility]  CARDIOLOGY [address] [place] [address]  Scheduled Appointment: 05/21/2024

## 2024-04-11 NOTE — PROGRESS NOTE ADULT - SUBJECTIVE AND OBJECTIVE BOX
Patient is a 88y old  Female who presents with a chief complaint of syncope (11 Apr 2024 11:01)    Patient was seen and examined.  C/o dizziness  Denies any other complaints.    PAST MEDICAL & SURGICAL HISTORY:  Hypertension  Gout  Diverticulitis sigmoid  Rheumatoid arthritis  DVT, lower extremity Bilateral  Pulmonary embolism  Chronic HFrEF (heart failure with reduced ejection fraction)  AICD (automatic cardioverter/defibrillator) present  ESRD on dialysis  History of appendectomy  History of tonsillectomy  History of hysterectomy  H/O hernia repair    Allergies  cephalosporins (Angioedema)  Keflex (Swelling)    MEDICATIONS  (STANDING):  apixaban 2.5 milliGRAM(s) Oral every 12 hours  atorvastatin 80 milliGRAM(s) Oral at bedtime  calcitriol   Capsule 0.5 MICROGram(s) Oral every 12 hours  chlorhexidine 2% Cloths 1 Application(s) Topical <User Schedule>  darbepoetin Injectable Syringe 25 MICROGram(s) IV Push <User Schedule>  famotidine    Tablet 20 milliGRAM(s) Oral daily  ferrous    sulfate 325 milliGRAM(s) Oral <User Schedule>  folic acid 1 milliGRAM(s) Oral daily  metoprolol succinate ER 50 milliGRAM(s) Oral daily  polyethylene glycol 3350 17 Gram(s) Oral two times a day  senna 2 Tablet(s) Oral at bedtime  sertraline 25 milliGRAM(s) Oral daily    MEDICATIONS  (PRN):  acetaminophen     Tablet .. 650 milliGRAM(s) Oral every 6 hours PRN Temp greater or equal to 38C (100.4F)  albuterol    90 MICROgram(s) HFA Inhaler 2 Puff(s) Inhalation every 6 hours PRN for shortness of breath and/or wheezing    Vital Signs Last 24 Hrs  T(C): 36.2  T(F): 97.2  HR: 65  BP: 127/68  BP(mean): --  RR: 18  SpO2: 99%    O/E:  Awake, alert, not in distress.  HEENT: atraumatic, EOMI.  Chest: clear.  CVS: SIS2 +, no murmur.  P/A: Soft, BS+  CNS: awake, alert  Ext: no edema feet.  Skin: no rash, no ulcers.  All systems reviewed positive findings as above.                          9.1<L>  7.19  )-----------( 307      ( 11 Apr 2024 05:53 )             30.1<L>                        8.9<L>  6.94  )-----------( 310      ( 10 Apr 2024 05:36 )             29.4<L>    04-11    132<L>  |  94<L>  |  23<H>  ----------------------------<  87  4.2   |  23  |  4.2<HH>  04-10    135  |  97<L>  |  18  ----------------------------<  105<H>  4.4   |  29  |  3.5<H>    Ca    8.3<L>      11 Apr 2024 05:53  Ca    8.4      10 Apr 2024 05:36  Phos  2.9     04-11  Mg     2.1     04-11    TPro  6.0  /  Alb  3.2<L>  /  TBili  0.3  /  DBili  x   /  AST  52<H>  /  ALT  26  /  AlkPhos  166<H>  04-11  TPro  5.8<L>  /  Alb  3.1<L>  /  TBili  0.3  /  DBili  x   /  AST  57<H>  /  ALT  26  /  AlkPhos  148<H>  04-10            Urinalysis Basic - ( 11 Apr 2024 05:53 )    Color: x / Appearance: x / SG: x / pH: x  Gluc: 87 mg/dL / Ketone: x  / Bili: x / Urobili: x   Blood: x / Protein: x / Nitrite: x   Leuk Esterase: x / RBC: x / WBC x   Sq Epi: x / Non Sq Epi: x / Bacteria: x

## 2024-04-11 NOTE — DISCHARGE NOTE PROVIDER - NSDCMRMEDTOKEN_GEN_ALL_CORE_FT
Albuterol (Eqv-Proventil HFA) 90 mcg/inh inhalation aerosol: 2 puff(s) inhaled every 6 hours as needed for  shortness of breath and/or wheezing  apixaban 2.5 mg oral tablet: 1 tab(s) orally 2 times a day  atorvastatin 80 mg oral tablet: 1 tab(s) orally once a day (at bedtime)  calcitriol 0.5 mcg oral capsule: 1 cap(s) orally 2 times a day  Entresto 24 mg-26 mg oral tablet: 1 tab(s) orally 2 times a day  ferrous sulfate 325 mg (65 mg elemental iron) oral tablet: 1 tab(s) orally every other day  folic acid 1 mg oral tablet: 1 tab(s) orally once a day  furosemide 40 mg oral tablet: 1 tab(s) orally once a day  Imdur 30 mg oral tablet, extended release: 1 tab(s) orally once a day  latanoprost 0.005% ophthalmic solution: 1 drop(s) in each eye 2 times a day  metoprolol succinate 50 mg oral capsule, extended release: 1 cap(s) orally once a day  olopatadine 0.2% ophthalmic solution: 1 drop(s) in each affected eye once a day  sertraline 25 mg oral tablet: 1 tab(s) orally once a day  Timolol Maleate, Ophthalmic 0.5% preservative-free ophthalmic solution: 1 drop(s) to both eyes 2 times a day   Albuterol (Eqv-Proventil HFA) 90 mcg/inh inhalation aerosol: 2 puff(s) inhaled every 6 hours as needed for  shortness of breath and/or wheezing  apixaban 2.5 mg oral tablet: 1 tab(s) orally 2 times a day  atorvastatin 80 mg oral tablet: 1 tab(s) orally once a day (at bedtime)  calcitriol 0.5 mcg oral capsule: 1 cap(s) orally 2 times a day  ferrous sulfate 325 mg (65 mg elemental iron) oral tablet: 1 tab(s) orally every other day  folic acid 1 mg oral tablet: 1 tab(s) orally once a day  latanoprost 0.005% ophthalmic solution: 1 drop(s) in each eye 2 times a day  metoprolol succinate 50 mg oral capsule, extended release: 1 cap(s) orally once a day  olopatadine 0.2% ophthalmic solution: 1 drop(s) in each affected eye once a day  sertraline 25 mg oral tablet: 1 tab(s) orally once a day  Timolol Maleate, Ophthalmic 0.5% preservative-free ophthalmic solution: 1 drop(s) to both eyes 2 times a day   Albuterol (Eqv-Proventil HFA) 90 mcg/inh inhalation aerosol: 2 puff(s) inhaled every 6 hours as needed for  shortness of breath and/or wheezing  apixaban 2.5 mg oral tablet: 1 tab(s) orally 2 times a day  atorvastatin 80 mg oral tablet: 1 tab(s) orally once a day (at bedtime)  calcitriol 0.5 mcg oral capsule: 1 cap(s) orally 2 times a day  folic acid 1 mg oral tablet: 1 tab(s) orally once a day  latanoprost 0.005% ophthalmic solution: 1 drop(s) in each eye 2 times a day  metoprolol succinate 50 mg oral capsule, extended release: 1 cap(s) orally once a day  olopatadine 0.2% ophthalmic solution: 1 drop(s) in each affected eye once a day  sertraline 25 mg oral tablet: 1 tab(s) orally once a day  Timolol Maleate, Ophthalmic 0.5% preservative-free ophthalmic solution: 1 drop(s) to both eyes 2 times a day

## 2024-04-11 NOTE — DISCHARGE NOTE PROVIDER - CARE PROVIDER_API CALL
Fabio Ward  [facility] Medicine  [address] [place] [address]  Phone: [phone]  Fax: [phone]  Follow Up Time: 2 weeks    Kamini Gutierrez  Nephrology  [address] [place] [address]  Phone: [phone]  Fax: [phone]  Follow Up Time: 2 weeks

## 2024-04-12 LAB
ALBUMIN SERPL ELPH-MCNC: 2.9 G/DL — LOW (ref 3.5–5.2)
ALP SERPL-CCNC: 161 U/L — HIGH (ref 30–115)
ALT FLD-CCNC: 22 U/L — SIGNIFICANT CHANGE UP (ref 0–41)
ANION GAP SERPL CALC-SCNC: 14 MMOL/L — SIGNIFICANT CHANGE UP (ref 7–14)
AST SERPL-CCNC: 45 U/L — HIGH (ref 0–41)
BILIRUB SERPL-MCNC: 0.3 MG/DL — SIGNIFICANT CHANGE UP (ref 0.2–1.2)
BUN SERPL-MCNC: 12 MG/DL — SIGNIFICANT CHANGE UP (ref 10–20)
CALCIUM SERPL-MCNC: 8.5 MG/DL — SIGNIFICANT CHANGE UP (ref 8.4–10.5)
CHLORIDE SERPL-SCNC: 98 MMOL/L — SIGNIFICANT CHANGE UP (ref 98–110)
CO2 SERPL-SCNC: 25 MMOL/L — SIGNIFICANT CHANGE UP (ref 17–32)
CREAT SERPL-MCNC: 3.2 MG/DL — HIGH (ref 0.7–1.5)
EGFR: 13 ML/MIN/1.73M2 — LOW
GLUCOSE SERPL-MCNC: 76 MG/DL — SIGNIFICANT CHANGE UP (ref 70–99)
HCT VFR BLD CALC: 31.5 % — LOW (ref 37–47)
HGB BLD-MCNC: 9.6 G/DL — LOW (ref 12–16)
MAGNESIUM SERPL-MCNC: 1.9 MG/DL — SIGNIFICANT CHANGE UP (ref 1.8–2.4)
MCHC RBC-ENTMCNC: 28 PG — SIGNIFICANT CHANGE UP (ref 27–31)
MCHC RBC-ENTMCNC: 30.5 G/DL — LOW (ref 32–37)
MCV RBC AUTO: 91.8 FL — SIGNIFICANT CHANGE UP (ref 81–99)
NRBC # BLD: 0 /100 WBCS — SIGNIFICANT CHANGE UP (ref 0–0)
PHOSPHATE SERPL-MCNC: 2.7 MG/DL — SIGNIFICANT CHANGE UP (ref 2.1–4.9)
PLATELET # BLD AUTO: 293 K/UL — SIGNIFICANT CHANGE UP (ref 130–400)
PMV BLD: 9.4 FL — SIGNIFICANT CHANGE UP (ref 7.4–10.4)
POTASSIUM SERPL-MCNC: 4.1 MMOL/L — SIGNIFICANT CHANGE UP (ref 3.5–5)
POTASSIUM SERPL-SCNC: 4.1 MMOL/L — SIGNIFICANT CHANGE UP (ref 3.5–5)
PROT SERPL-MCNC: 5.7 G/DL — LOW (ref 6–8)
RBC # BLD: 3.43 M/UL — LOW (ref 4.2–5.4)
RBC # FLD: 13.6 % — SIGNIFICANT CHANGE UP (ref 11.5–14.5)
SODIUM SERPL-SCNC: 137 MMOL/L — SIGNIFICANT CHANGE UP (ref 135–146)
WBC # BLD: 6.72 K/UL — SIGNIFICANT CHANGE UP (ref 4.8–10.8)
WBC # FLD AUTO: 6.72 K/UL — SIGNIFICANT CHANGE UP (ref 4.8–10.8)

## 2024-04-12 PROCEDURE — 99232 SBSQ HOSP IP/OBS MODERATE 35: CPT

## 2024-04-12 RX ADMIN — Medication 325 MILLIGRAM(S): at 12:09

## 2024-04-12 RX ADMIN — POLYETHYLENE GLYCOL 3350 17 GRAM(S): 17 POWDER, FOR SOLUTION ORAL at 06:07

## 2024-04-12 RX ADMIN — CALCITRIOL 0.5 MICROGRAM(S): 0.5 CAPSULE ORAL at 06:08

## 2024-04-12 RX ADMIN — Medication 1 MILLIGRAM(S): at 12:11

## 2024-04-12 RX ADMIN — Medication 600 MILLIGRAM(S): at 07:11

## 2024-04-12 RX ADMIN — CALCITRIOL 0.5 MICROGRAM(S): 0.5 CAPSULE ORAL at 17:41

## 2024-04-12 RX ADMIN — ATORVASTATIN CALCIUM 80 MILLIGRAM(S): 80 TABLET, FILM COATED ORAL at 22:21

## 2024-04-12 RX ADMIN — APIXABAN 2.5 MILLIGRAM(S): 2.5 TABLET, FILM COATED ORAL at 17:42

## 2024-04-12 RX ADMIN — SERTRALINE 25 MILLIGRAM(S): 25 TABLET, FILM COATED ORAL at 12:10

## 2024-04-12 RX ADMIN — CHLORHEXIDINE GLUCONATE 1 APPLICATION(S): 213 SOLUTION TOPICAL at 06:08

## 2024-04-12 RX ADMIN — Medication 50 MILLIGRAM(S): at 06:08

## 2024-04-12 RX ADMIN — APIXABAN 2.5 MILLIGRAM(S): 2.5 TABLET, FILM COATED ORAL at 06:08

## 2024-04-12 RX ADMIN — Medication 600 MILLIGRAM(S): at 17:41

## 2024-04-12 NOTE — PROGRESS NOTE ADULT - SUBJECTIVE AND OBJECTIVE BOX
Patient is a 88y old  Female who presents with a chief complaint of syncope (11 Apr 2024 11:01)    Patient was seen and examined.  C/o diarrhea  Denies any other new  complaints.    PAST MEDICAL & SURGICAL HISTORY:  Hypertension  Gout  Diverticulitis sigmoid  Rheumatoid arthritis  DVT, lower extremity Bilateral  Pulmonary embolism  Chronic HFrEF (heart failure with reduced ejection fraction)  AICD (automatic cardioverter/defibrillator) present  ESRD on dialysis  History of appendectomy  History of tonsillectomy  History of hysterectomy  H/O hernia repair    Allergies  cephalosporins (Angioedema)  Keflex (Swelling)    MEDICATIONS  (STANDING):  apixaban 2.5 milliGRAM(s) Oral every 12 hours  atorvastatin 80 milliGRAM(s) Oral at bedtime  calcitriol   Capsule 0.5 MICROGram(s) Oral every 12 hours  chlorhexidine 2% Cloths 1 Application(s) Topical <User Schedule>  darbepoetin Injectable Syringe 25 MICROGram(s) IV Push <User Schedule>  ferrous    sulfate 325 milliGRAM(s) Oral <User Schedule>  folic acid 1 milliGRAM(s) Oral daily  guaiFENesin  milliGRAM(s) Oral every 12 hours  metoprolol succinate ER 50 milliGRAM(s) Oral daily  sertraline 25 milliGRAM(s) Oral daily    MEDICATIONS  (PRN):  acetaminophen     Tablet .. 650 milliGRAM(s) Oral every 6 hours PRN Temp greater or equal to 38C (100.4F)  albuterol    90 MICROgram(s) HFA Inhaler 2 Puff(s) Inhalation every 6 hours PRN for shortness of breath and/or wheezing    T(C): 36.3 (04-12-24 @ 07:14), Max: 36.3 (04-11-24 @ 16:02)  HR: 70 (04-12-24 @ 07:14) (68 - 75)  BP: 133/70 (04-12-24 @ 07:14) (115/64 - 133/70)  RR: 18 (04-12-24 @ 07:14) (18 - 18)  SpO2: --    O/E:  Awake, alert, not in distress.  HEENT: atraumatic, EOMI.  Chest: clear.  CVS: SIS2 +, no murmur.  P/A: Soft, BS+  CNS: awake, alert  Ext: no edema feet.  Skin: no rash, no ulcers.  All systems reviewed positive findings as above.                          9.6<L>  6.72  )-----------( 293      ( 12 Apr 2024 06:00 )             31.5<L>                        9.1<L>  7.19  )-----------( 307      ( 11 Apr 2024 05:53 )             30.1<L>  04-12    137  |  98  |  12  ----------------------------<  76  4.1   |  25  |  3.2<H>  04-11    132<L>  |  94<L>  |  23<H>  ----------------------------<  87  4.2   |  23  |  4.2<HH>    Ca    8.5      12 Apr 2024 06:00  Ca    8.3<L>      11 Apr 2024 05:53  Phos  2.7     04-12  Mg     1.9     04-12    TPro  5.7<L>  /  Alb  2.9<L>  /  TBili  0.3  /  DBili  x   /  AST  45<H>  /  ALT  22  /  AlkPhos  161<H>  04-12  TPro  6.0  /  Alb  3.2<L>  /  TBili  0.3  /  DBili  x   /  AST  52<H>  /  ALT  26  /  AlkPhos  166<H>  04-11

## 2024-04-12 NOTE — PROGRESS NOTE ADULT - ASSESSMENT
89yo  female patient (Sabianism) with hx of HTN, DVT/PE 2018, NICM - HFrEF(LVEF 40-45% in 9/22) s/p AICD Medtronic, RA, CKD stage 5 recently admitted 3/22  presented for syncope.     #Syncope, Possibly 2/2 Dehydration  - VS stable on admission  - Labs on admission --> Hb 10.3 (BL), Trop 96>91, BUN/Cr 17/2.6  - Imaging on admission --> CTH w/ nothing acute. CTA Neck w/ focal irregular stenosis of L VA and focal moderate stenosis of R VA. CTAP w/ nothing acute.   - Admitted to Telemetry for Syncope  - Telemetry Course: Neuroendovascular recommended OP F/U. TTE w/ EF 45%, G1DD, Sev LVH, Sev LAE, Deg MV w/ thickening, mild MV, mild-mod TR, mild PH, no effusion. EP interrogated device w/o any events. CXR and RVP negative. Orthostatics positive. No events on Tele. PT recommended STR but family refuses and Pt will be going home on 4/11. Tele DC'd and Pt transferred to .   - 4/12: Repeat orthostatics today. May consider adding Midodrine. Plan to DC if stable.     #CKD-V, Recently Started on HD  #Anemia  - Follows with Dr. Urrutia, OP HD spot at [facility]   - Patient is Sabianism, will not accept blood products  - Sat 22% Ferritin 700, may receive VEnofer 200 mg IV x 2 doses  - Telemetry Course: Nephro on board for HD, last session 4/9, next session 4/11. Held Home Imdur, Entresto and Lasix, F/U w/ Nephro regarding reintroducing these.   - 4/11: S/p HD    #NICM - HFrEF(LVEF 35-40% in 9/22)   #s/p AICD MEdtronic  #HTN  - Follows OP with Dr. Mckinney  - TTE at [facility] 3/2024: EF 45-50%, GIIDD  - Home Imdur, Entresto & Lasix held in setting of syncope  - C/w Home Toprol 50mg QD    #Constipation  #Dyspepsia  - C/w Pepcid, Miralax, Senna    # DVT/PE  - C/w Home Eliquis 2.5mg BID    #RA  - Not on home meds    #MISC  - DVT: Eliquis 2.5mg BID  - GI: Pepcid  - Diet: DASH, Fluid Restriction   89yo  female patient (Nondenominational) with hx of HTN, DVT/PE 2018, NICM - HFrEF(LVEF 40-45% in 9/22) s/p AICD Medtronic, RA, CKD stage 5 recently admitted 3/22  presented for syncope.     #Syncope, Possibly 2/2 Dehydration  - VS stable on admission  - Labs on admission --> Hb 10.3 (BL), Trop 96>91, BUN/Cr 17/2.6  - Imaging on admission --> CTH w/ nothing acute. CTA Neck w/ focal irregular stenosis of L VA and focal moderate stenosis of R VA. CTAP w/ nothing acute.   - Admitted to Telemetry for Syncope  - Telemetry Course: Neuroendovascular recommended OP F/U. TTE w/ EF 45%, G1DD, Sev LVH, Sev LAE, Deg MV w/ thickening, mild MV, mild-mod TR, mild PH, no effusion. EP interrogated device w/o any events. CXR and RVP negative. Orthostatics positive. No events on Tele. PT recommended STR but family refuses and Pt will be going home on 4/11. Tele DC'd and Pt transferred to .   - 4/12: Repeat orthostatics today. Plan to DC if stable.     #CKD-V, Recently Started on HD  #Anemia  - Follows with Dr. Urrutia, OP HD spot at [facility]   - Patient is Nondenominational, will not accept blood products  - Sat 22% Ferritin 700, may receive VEnofer 200 mg IV x 2 doses  - Telemetry Course: Nephro on board for HD, last session 4/9, next session 4/11. Held Home Imdur, Entresto and Lasix, F/U w/ Nephro regarding reintroducing these.   - 4/11: S/p HD    #NICM - HFrEF(LVEF 35-40% in 9/22)   #s/p AICD MEdtronic  #HTN  - Follows OP with Dr. Mckinney  - TTE at [facility] [place] 3/2024: EF 45-50%, GIIDD  - Home Imdur, Entresto & Lasix held in setting of syncope  - C/w Home Toprol 50mg QD    #Constipation - resolved  #Diarrhea  #Dyspepsia  - 4/12: Pt complaining of diarrhea. Miralax and Senna DC'd.  - C/w Pepcid    # DVT/PE  - C/w Home Eliquis 2.5mg BID    #RA  - Not on home meds    #MISC  - DVT: Eliquis 2.5mg BID  - GI: Pepcid  - Diet: DASH, Fluid Restriction

## 2024-04-12 NOTE — PROGRESS NOTE ADULT - SUBJECTIVE AND OBJECTIVE BOX
Nephrology progress note    Patient is seen and examined, events over the last 24 h noted .  Diarrhea [resent  BP was low, off BP meds  Allergies:  cephalosporins (Angioedema)  Keflex (Swelling)    Hospital Medications:   MEDICATIONS  (STANDING):  apixaban 2.5 milliGRAM(s) Oral every 12 hours  atorvastatin 80 milliGRAM(s) Oral at bedtime  calcitriol   Capsule 0.5 MICROGram(s) Oral every 12 hours  chlorhexidine 2% Cloths 1 Application(s) Topical <User Schedule>  darbepoetin Injectable Syringe 25 MICROGram(s) IV Push <User Schedule>  ferrous    sulfate 325 milliGRAM(s) Oral <User Schedule>  folic acid 1 milliGRAM(s) Oral daily  guaiFENesin  milliGRAM(s) Oral every 12 hours  metoprolol succinate ER 50 milliGRAM(s) Oral daily  sertraline 25 milliGRAM(s) Oral daily        VITALS:  T(F): 97.3 (04-12-24 @ 07:14), Max: 97.4 (04-11-24 @ 16:02)  HR: 70 (04-12-24 @ 07:14)  BP: 133/70 (04-12-24 @ 07:14)  RR: 18 (04-12-24 @ 07:14)  SpO2: --  Wt(kg): --    04-10 @ 07:01  -  04-11 @ 07:00  --------------------------------------------------------  IN: 118 mL / OUT: 0 mL / NET: 118 mL    04-11 @ 07:01  -  04-12 @ 07:00  --------------------------------------------------------  IN: 120 mL / OUT: 1500 mL / NET: -1380 mL    04-12 @ 07:01  -  04-12 @ 15:01  --------------------------------------------------------  IN: 120 mL / OUT: 0 mL / NET: 120 mL          PHYSICAL EXAM:  Constitutional: NAD  HEENT: anicteric sclera  Neck: No JVD  Respiratory: CTA  Cardiovascular: S1, S2, RRR  Gastrointestinal: BS+, soft, NT/ND  Extremities: No peripheral edema  Neurological: A/O x 3  : No CVA tenderness. No carlos.   Skin: No rashes  Vascular Access:    LABS:  04-12    137  |  98  |  12  ----------------------------<  76  4.1   |  25  |  3.2<H>    Ca    8.5      12 Apr 2024 06:00  Phos  2.7     04-12  Mg     1.9     04-12    TPro  5.7<L>  /  Alb  2.9<L>  /  TBili  0.3  /  DBili      /  AST  45<H>  /  ALT  22  /  AlkPhos  161<H>  04-12                          9.6    6.72  )-----------( 293      ( 12 Apr 2024 06:00 )             31.5       Urine Studies:  Urinalysis Basic - ( 12 Apr 2024 06:00 )    Color:  / Appearance:  / SG:  / pH:   Gluc: 76 mg/dL / Ketone:   / Bili:  / Urobili:    Blood:  / Protein:  / Nitrite:    Leuk Esterase:  / RBC:  / WBC    Sq Epi:  / Non Sq Epi:  / Bacteria:         RADIOLOGY & ADDITIONAL STUDIES:

## 2024-04-12 NOTE — PROGRESS NOTE ADULT - ASSESSMENT
89yo  female patient (Protestant) with hx of HTN, DVT/PE 2018 (thought provoked by travel) completed 6m of AC then 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, NICM - HFrEF(LVEF 40-45% in 9/22) s/p AICD Medtronic, RA, ESRD on HD recently admitted 3/22 and discharged yesterday, for fluctuating mental status and abdominal pain found to have PNA, enteritis and pyelo complicated by OMERO and CKD, TDC placed by IR 4/1 and RRT done 4/1 and 4/2, discharged with o/p HD arranged.  Presented yesterday for syncope. Nephrology will be consulted for ESRD on HD.    ESRD on HD/ Syncope    - hd due tomorrow standard bath uf minimal  as tolerated   - Blood pressure  was low, better off meds  Diarrhea - stool will be sent for C diff  - h/h noted, cont aranesp.  iron stores noted, needs venofer qHD  - ph on the low side / no binders    - C/w home calcitriol dose   will follow

## 2024-04-12 NOTE — PROGRESS NOTE ADULT - SUBJECTIVE AND OBJECTIVE BOX
24H events:    Patient is a 88y old Female who presents with a chief complaint of syncope (10 Apr 2024 10:14)    Primary diagnosis of Syncope      Today is hospital day 5d. This morning patient was seen and examined at bedside, resting comfortably in bed.    No acute or major events overnight.    Code Status: DNR/DNI: Trial NIV    PAST MEDICAL & SURGICAL HISTORY  Hypertension    Gout    Diverticulitis  sigmoid    Rheumatoid arthritis    DVT, lower extremity  Bilateral    Pulmonary embolism    Chronic HFrEF (heart failure with reduced ejection fraction)    AICD (automatic cardioverter/defibrillator) present    ESRD on dialysis    Artificial pacemaker    History of appendectomy    History of tonsillectomy    History of hysterectomy    H/O hernia repair      SOCIAL HISTORY:  Social History: NA    ALLERGIES:  cephalosporins (Angioedema)  Keflex (Swelling)    MEDICATIONS:  STANDING MEDICATIONS  apixaban 2.5 milliGRAM(s) Oral every 12 hours  atorvastatin 80 milliGRAM(s) Oral at bedtime  calcitriol   Capsule 0.5 MICROGram(s) Oral every 12 hours  chlorhexidine 2% Cloths 1 Application(s) Topical <User Schedule>  darbepoetin Injectable Syringe 25 MICROGram(s) IV Push <User Schedule>  ferrous    sulfate 325 milliGRAM(s) Oral <User Schedule>  folic acid 1 milliGRAM(s) Oral daily  metoprolol succinate ER 50 milliGRAM(s) Oral daily  pantoprazole   Suspension 40 milliGRAM(s) Oral daily  polyethylene glycol 3350 17 Gram(s) Oral two times a day  senna 2 Tablet(s) Oral at bedtime  sertraline 25 milliGRAM(s) Oral daily    PRN MEDICATIONS  acetaminophen     Tablet .. 650 milliGRAM(s) Oral every 6 hours PRN  albuterol    90 MICROgram(s) HFA Inhaler 2 Puff(s) Inhalation every 6 hours PRN      ROS:   no headaches, no dizziness, no fevers, no chills, no CP/SOB, no abdominal pain, no n/v/d, no hematochezia, no burning with urination, no hematuria, no weakness or tingling    I&O:  I&O's Summary    10 Apr 2024 07:01  -  11 Apr 2024 07:00  --------------------------------------------------------  IN: 118 mL / OUT: 0 mL / NET: 118 mL    VITALS:   ICU Vital Signs Last 24 Hrs  T(C): 36.5 (11 Apr 2024 05:05), Max: 36.6 (10 Apr 2024 13:27)  T(F): 97.7 (11 Apr 2024 05:05), Max: 97.9 (10 Apr 2024 13:27)  HR: 68 (11 Apr 2024 05:05) (66 - 68)  BP: 121/65 (11 Apr 2024 05:05) (121/65 - 137/72)  BP(mean): --  ABP: --  ABP(mean): --  RR: 19 (11 Apr 2024 05:05) (18 - 19)  SpO2: 99% (11 Apr 2024 05:05) (99% - 99%)    PHYSICAL EXAM:  GENERAL: lying in bed comfortably  HEAD: normal  HEART: RRR  LUNGS: CTA b/l  ABDOMEN: soft nontender nondistended  EXTREMITIES: no edema  NERVOUS SYSTEM:  A&OX3    Lines: TDC    LABS:                        8.9    6.94  )-----------( 310      ( 10 Apr 2024 05:36 )             29.4     04-10    135  |  97<L>  |  18  ----------------------------<  105<H>  4.4   |  29  |  3.5<H>    Ca    8.4      10 Apr 2024 05:36  Phos  2.5     04-10  Mg     2.0     04-10    TPro  5.8<L>  /  Alb  3.1<L>  /  TBili  0.3  /  DBili  x   /  AST  57<H>  /  ALT  26  /  AlkPhos  148<H>  04-10      Urinalysis Basic - ( 10 Apr 2024 05:36 )    Color: x / Appearance: x / SG: x / pH: x  Gluc: 105 mg/dL / Ketone: x  / Bili: x / Urobili: x   Blood: x / Protein: x / Nitrite: x   Leuk Esterase: x / RBC: x / WBC x   Sq Epi: x / Non Sq Epi: x / Bacteria: x      RADIOLOGY:    < from: Xray Chest 1 View- PORTABLE-Urgent (Xray Chest 1 View- PORTABLE-Urgent .) (04.07.24 @ 16:56) >  IMPRESSION:    No radiographic evidence of acute cardiopulmonary disease.    < end of copied text >

## 2024-04-12 NOTE — PROGRESS NOTE ADULT - ASSESSMENT
89yo  female patient (Gnosticism) with hx of HTN, DVT/PE 2018 (thought provoked by travel) completed 6m of AC then 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, NICM - HFrEF(LVEF 40-45% in 9/22) s/p AICD Medtronic, RA, CKD stage 5 --> presented today for syncope.    # Diarrhea  - dc senna, miralax    # Syncope sec to orthostatic Hypotension  # Right vertebral artery stenosis - moderate  - CT Angio Neck w/ IV Cont (04.07.24 @ 00:35) >Focal irregular moderate stenosis at the distal V2/proximal V3 segments of the right vertebral artery. Focal moderate stenosis of the proximal intracranial segment of the right vertebral artery.  - evaluated by neurology --> outpt F/u  -  TTE Echo Complete w/ Contrast w/o Doppler (04.09.24 @ 08:18) >Mildly decreased global left ventricular systolic function with a  EF of 45%. Mild (Grade I) diastolic dysfunction. Severe concentric left ventricular hypertrophy.Severely enlarged left atrium.Degenerative mitral valve with thickening and calcification of the anterior and posterior mitral valve leaflets and mitral annular calcification.Mild-moderate tricuspid regurgitation.mild pulmonary hypertension.  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    # Diarrhea  - dc senna, miralax    # Syncope sec to orthostatic Hypotension  # Right vertebral artery stenosis - moderate  - CT Angio Neck w/ IV Cont (04.07.24 @ 00:35) >Focal irregular moderate stenosis at the distal V2/proximal V3 segments of the right vertebral artery. Focal moderate stenosis of the proximal intracranial segment of the right vertebral artery.  - evaluated by neurology --> outpt F/u  -  TTE Echo Complete w/ Contrast w/o Doppler (04.09.24 @ 08:18) >Mildly decreased global left ventricular systolic function with a  EF of 45%. Mild (Grade I) diastolic dysfunction. Severe concentric left ventricular hypertrophy.Severely enlarged left atrium.Degenerative mitral valve with thickening and calcification of the anterior and posterior mitral valve leaflets and mitral annular calcification.Mild-moderate tricuspid regurgitation.mild pulmonary hypertension.  - AICD interrogated- no events  - Hold lasix, entresto, imdur  - c/w eliquis, statin    # CKD Stage 5  on HD  # Normocytic Anemia  # Hyponatremia- resolved  - aranesp and  venofer qHD  - c/w  calcitriol   - HD as per nephrology     # NICM, chr systolc and diastolic CHF S/p AICD  # Hypertension  - c/w  toprol  - Hold lasix, entresto, imdur    # H/o PE, DVT  - c/w eliquis    # DNR/ DNI/trial of NIV    # Pending: monitor BP, orthostats q shift  # Disposition: Home when stable

## 2024-04-13 LAB
ANION GAP SERPL CALC-SCNC: 17 MMOL/L — HIGH (ref 7–14)
BUN SERPL-MCNC: 21 MG/DL — HIGH (ref 10–20)
CALCIUM SERPL-MCNC: 8.6 MG/DL — SIGNIFICANT CHANGE UP (ref 8.4–10.4)
CHLORIDE SERPL-SCNC: 93 MMOL/L — LOW (ref 98–110)
CO2 SERPL-SCNC: 23 MMOL/L — SIGNIFICANT CHANGE UP (ref 17–32)
CREAT SERPL-MCNC: 4.4 MG/DL — CRITICAL HIGH (ref 0.7–1.5)
EGFR: 9 ML/MIN/1.73M2 — LOW
GLUCOSE SERPL-MCNC: 72 MG/DL — SIGNIFICANT CHANGE UP (ref 70–99)
HCT VFR BLD CALC: 32.6 % — LOW (ref 37–47)
HGB BLD-MCNC: 9.8 G/DL — LOW (ref 12–16)
MAGNESIUM SERPL-MCNC: 1.9 MG/DL — SIGNIFICANT CHANGE UP (ref 1.8–2.4)
MCHC RBC-ENTMCNC: 27.7 PG — SIGNIFICANT CHANGE UP (ref 27–31)
MCHC RBC-ENTMCNC: 30.1 G/DL — LOW (ref 32–37)
MCV RBC AUTO: 92.1 FL — SIGNIFICANT CHANGE UP (ref 81–99)
NRBC # BLD: 0 /100 WBCS — SIGNIFICANT CHANGE UP (ref 0–0)
PLATELET # BLD AUTO: 345 K/UL — SIGNIFICANT CHANGE UP (ref 130–400)
PMV BLD: 9.2 FL — SIGNIFICANT CHANGE UP (ref 7.4–10.4)
POTASSIUM SERPL-MCNC: 4 MMOL/L — SIGNIFICANT CHANGE UP (ref 3.5–5)
POTASSIUM SERPL-SCNC: 4 MMOL/L — SIGNIFICANT CHANGE UP (ref 3.5–5)
RBC # BLD: 3.54 M/UL — LOW (ref 4.2–5.4)
RBC # FLD: 13.8 % — SIGNIFICANT CHANGE UP (ref 11.5–14.5)
SODIUM SERPL-SCNC: 133 MMOL/L — LOW (ref 135–146)
WBC # BLD: 7.74 K/UL — SIGNIFICANT CHANGE UP (ref 4.8–10.8)
WBC # FLD AUTO: 7.74 K/UL — SIGNIFICANT CHANGE UP (ref 4.8–10.8)

## 2024-04-13 PROCEDURE — 99232 SBSQ HOSP IP/OBS MODERATE 35: CPT

## 2024-04-13 RX ADMIN — SERTRALINE 25 MILLIGRAM(S): 25 TABLET, FILM COATED ORAL at 12:08

## 2024-04-13 RX ADMIN — CALCITRIOL 0.5 MICROGRAM(S): 0.5 CAPSULE ORAL at 05:49

## 2024-04-13 RX ADMIN — CHLORHEXIDINE GLUCONATE 1 APPLICATION(S): 213 SOLUTION TOPICAL at 05:48

## 2024-04-13 RX ADMIN — ATORVASTATIN CALCIUM 80 MILLIGRAM(S): 80 TABLET, FILM COATED ORAL at 21:50

## 2024-04-13 RX ADMIN — APIXABAN 2.5 MILLIGRAM(S): 2.5 TABLET, FILM COATED ORAL at 05:49

## 2024-04-13 RX ADMIN — Medication 1 MILLIGRAM(S): at 12:08

## 2024-04-13 RX ADMIN — APIXABAN 2.5 MILLIGRAM(S): 2.5 TABLET, FILM COATED ORAL at 17:27

## 2024-04-13 RX ADMIN — Medication 600 MILLIGRAM(S): at 17:26

## 2024-04-13 RX ADMIN — Medication 600 MILLIGRAM(S): at 05:49

## 2024-04-13 RX ADMIN — CALCITRIOL 0.5 MICROGRAM(S): 0.5 CAPSULE ORAL at 17:26

## 2024-04-13 RX ADMIN — Medication 50 MILLIGRAM(S): at 05:50

## 2024-04-13 NOTE — PROGRESS NOTE ADULT - ASSESSMENT
87yo  female patient (Catholic) with hx of HTN, DVT/PE 2018 (thought provoked by travel) completed 6m of AC then 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, NICM - HFrEF(LVEF 40-45% in 9/22) s/p AICD Medtronic, RA, CKD stage 5 --> presented today for syncope.    # Diarrhea- resolving  - dc senna, miralax    # Syncope sec to orthostatic Hypotension  # Right vertebral artery stenosis - moderate  - CT Angio Neck w/ IV Cont (04.07.24 @ 00:35) >Focal irregular moderate stenosis at the distal V2/proximal V3 segments of the right vertebral artery. Focal moderate stenosis of the proximal intracranial segment of the right vertebral artery.  - evaluated by neurology --> outpt F/u  -  TTE Echo Complete w/ Contrast w/o Doppler (04.09.24 @ 08:18) >Mildly decreased global left ventricular systolic function with a  EF of 45%. Mild (Grade I) diastolic dysfunction. Severe concentric left ventricular hypertrophy.Severely enlarged left atrium.Degenerative mitral valve with thickening and calcification of the anterior and posterior mitral valve leaflets and mitral annular calcification.Mild-moderate tricuspid regurgitation.mild pulmonary hypertension.  - AICD interrogated- no events  - Hold lasix, entresto, imdur  - c/w eliquis, statin    # CKD Stage 5  on HD  # Normocytic Anemia  # Hyponatremia- resolved  - aranesp and  venofer qHD  - c/w  calcitriol   - HD as per nephrology     # NICM, chr systolc and diastolic CHF S/p AICD  # Hypertension  - c/w  toprol  - Hold lasix, entresto, imdur    # H/o PE, DVT  - c/w eliquis    # DNR/ DNI/trial of NIV    # Pending: monitor BP, anticipate for discharge in AM  # Disposition: Home when stable

## 2024-04-13 NOTE — PROGRESS NOTE ADULT - SUBJECTIVE AND OBJECTIVE BOX
seen and examined  24 h events noted   no distress         PAST HISTORY  --------------------------------------------------------------------------------  No significant changes to PMH, PSH, FHx, SHx, unless otherwise noted    ALLERGIES & MEDICATIONS  --------------------------------------------------------------------------------  Allergies    cephalosporins (Angioedema)  Keflex (Swelling)    Intolerances      Standing Inpatient Medications  apixaban 2.5 milliGRAM(s) Oral every 12 hours  atorvastatin 80 milliGRAM(s) Oral at bedtime  calcitriol   Capsule 0.5 MICROGram(s) Oral every 12 hours  chlorhexidine 2% Cloths 1 Application(s) Topical <User Schedule>  darbepoetin Injectable Syringe 25 MICROGram(s) IV Push <User Schedule>  ferrous    sulfate 325 milliGRAM(s) Oral <User Schedule>  folic acid 1 milliGRAM(s) Oral daily  guaiFENesin  milliGRAM(s) Oral every 12 hours  metoprolol succinate ER 50 milliGRAM(s) Oral daily  sertraline 25 milliGRAM(s) Oral daily    PRN Inpatient Medications  acetaminophen     Tablet .. 650 milliGRAM(s) Oral every 6 hours PRN  albuterol    90 MICROgram(s) HFA Inhaler 2 Puff(s) Inhalation every 6 hours PRN          VITALS/PHYSICAL EXAM  --------------------------------------------------------------------------------  T(C): 36.2 (04-13-24 @ 00:37), Max: 36.7 (04-12-24 @ 15:45)  HR: 69 (04-13-24 @ 00:37) (69 - 71)  BP: 166/86 (04-13-24 @ 00:37) (137/74 - 166/86)  RR: 19 (04-13-24 @ 00:37) (19 - 20)  SpO2: 97% (04-12-24 @ 15:45) (97% - 97%)  Wt(kg): --        04-12-24 @ 07:01  -  04-13-24 @ 07:00  --------------------------------------------------------  IN: 120 mL / OUT: 0 mL / NET: 120 mL      Physical Exam:  	Gen: NAD  	Pulm: decrease BS  B/L  	CV:  S1S2; no rub  	Abd:  soft, nontender/nondistended  	LE:  no edema  	Vascular access:tdc    LABS/STUDIES  --------------------------------------------------------------------------------              9.6    6.72  >-----------<  293      [04-12-24 @ 06:00]              31.5     137  |  98  |  12  ----------------------------<  76      [04-12-24 @ 06:00]  4.1   |  25  |  3.2        Ca     8.5     [04-12-24 @ 06:00]      Mg     1.9     [04-12-24 @ 06:00]      Phos  2.7     [04-12-24 @ 06:00]    TPro  5.7  /  Alb  2.9  /  TBili  0.3  /  DBili  x   /  AST  45  /  ALT  22  /  AlkPhos  161  [04-12-24 @ 06:00]    Creatinine Trend:  SCr 3.2 [04-12 @ 06:00]  SCr 4.2 [04-11 @ 05:53]  SCr 3.5 [04-10 @ 05:36]  SCr 5.0 [04-09 @ 05:43]  SCr 4.1 [04-08 @ 08:24]    Urinalysis - [04-12-24 @ 06:00]      Color  / Appearance  / SG  / pH       Gluc 76 / Ketone   / Bili  / Urobili        Blood  / Protein  / Leuk Est  / Nitrite       RBC  / WBC  / Hyaline  / Gran  / Sq Epi  / Non Sq Epi  / Bacteria       Iron 33, TIBC 147, %sat 22      [04-04-24 @ 06:44]  Ferritin 785      [04-04-24 @ 06:44]  PTH -- (Ca 8.2)      [04-09-24 @ 05:43]   35  PTH -- (Ca 8.2)      [03-24-24 @ 20:00]   138  Vitamin D (25OH) 11      [04-09-24 @ 05:43]  Lipid: chol 144, , HDL 39, LDL --      [03-22-24 @ 06:14]    HBsAb Nonreact      [03-27-24 @ 15:55]  HBsAg Nonreact      [04-01-24 @ 15:52]  HBcAb Nonreact      [04-03-24 @ 16:02]  HCV 0.20, Nonreact      [04-01-24 @ 15:52]

## 2024-04-13 NOTE — PROGRESS NOTE ADULT - ASSESSMENT
89yo  female patient (Oriental orthodox) with hx of HTN, DVT/PE 2018 (thought provoked by travel) completed 6m of AC then 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, NICM - HFrEF(LVEF 40-45% in 9/22) s/p AICD Medtronic, RA, ESRD on HD recently admitted 3/22 and discharged yesterday, for fluctuating mental status and abdominal pain found to have PNA, enteritis and pyelo complicated by OMERO and CKD, TDC placed by IR 4/1 and RRT done 4/1 and 4/2, discharged with o/p HD arranged.  Presented for syncope. Nephrology will be consulted for ESRD on HD.    ESRD on HD/ Syncope    - hd due today  standard bath uf minimal  as tolerated   - Blood pressure  was low, better off meds  - h/h noted, cont aranesp 25   iron stores noted, needs venofer qHD weekly with HD   - ph on the low side / no binders    - C/w home calcitriol dose   will follow

## 2024-04-13 NOTE — PROGRESS NOTE ADULT - SUBJECTIVE AND OBJECTIVE BOX
Patient is a 88y old  Female who presents with a chief complaint of syncope (11 Apr 2024 11:01)    Patient was seen and examined.  Diarrhea  improving  Denies any other new  complaints.    PAST MEDICAL & SURGICAL HISTORY:  Hypertension  Gout  Diverticulitis sigmoid  Rheumatoid arthritis  DVT, lower extremity Bilateral  Pulmonary embolism  Chronic HFrEF (heart failure with reduced ejection fraction)  AICD (automatic cardioverter/defibrillator) present  ESRD on dialysis  History of appendectomy  History of tonsillectomy  History of hysterectomy  H/O hernia repair    Allergies  cephalosporins (Angioedema)  Keflex (Swelling)    MEDICATIONS  (STANDING):  apixaban 2.5 milliGRAM(s) Oral every 12 hours  atorvastatin 80 milliGRAM(s) Oral at bedtime  calcitriol   Capsule 0.5 MICROGram(s) Oral every 12 hours  chlorhexidine 2% Cloths 1 Application(s) Topical <User Schedule>  darbepoetin Injectable Syringe 25 MICROGram(s) IV Push <User Schedule>  ferrous    sulfate 325 milliGRAM(s) Oral <User Schedule>  folic acid 1 milliGRAM(s) Oral daily  guaiFENesin  milliGRAM(s) Oral every 12 hours  metoprolol succinate ER 50 milliGRAM(s) Oral daily  sertraline 25 milliGRAM(s) Oral daily    MEDICATIONS  (PRN):  acetaminophen     Tablet .. 650 milliGRAM(s) Oral every 6 hours PRN Temp greater or equal to 38C (100.4F)  albuterol    90 MICROgram(s) HFA Inhaler 2 Puff(s) Inhalation every 6 hours PRN for shortness of breath and/or wheezing    T(C): 36.2 (04-13-24 @ 00:37), Max: 36.7 (04-12-24 @ 15:45)  HR: 81 (04-13-24 @ 11:00) (69 - 81)  BP: 135/80 (04-13-24 @ 11:00) (134/77 - 166/86)  RR: 18 (04-13-24 @ 11:00) (18 - 20)  SpO2: 97% (04-12-24 @ 15:45) (97% - 97%)    O/E:  Awake, alert, not in distress.  HEENT: atraumatic, EOMI.  Chest: clear.  CVS: SIS2 +, no murmur.  P/A: Soft, BS+  CNS: awake, alert  Ext: no edema feet.  Skin: no rash, no ulcers.  All systems reviewed positive findings as above.                                   9.8<L>  7.74  )-----------( 345      ( 13 Apr 2024 07:41 )             32.6<L>                        9.6<L>  6.72  )-----------( 293      ( 12 Apr 2024 06:00 )             31.5<L>  04-13    133<L>  |  93<L>  |  21<H>  ----------------------------<  72  4.0   |  23  |  4.4<HH>  04-12    137  |  98  |  12  ----------------------------<  76  4.1   |  25  |  3.2<H>    Ca    8.6      13 Apr 2024 07:41  Ca    8.5      12 Apr 2024 06:00  Phos  2.7     04-12  Mg     1.9     04-13    TPro  5.7<L>  /  Alb  2.9<L>  /  TBili  0.3  /  DBili  x   /  AST  45<H>  /  ALT  22  /  AlkPhos  161<H>  04-12

## 2024-04-14 LAB
ANION GAP SERPL CALC-SCNC: 12 MMOL/L — SIGNIFICANT CHANGE UP (ref 7–14)
BUN SERPL-MCNC: 13 MG/DL — SIGNIFICANT CHANGE UP (ref 10–20)
CALCIUM SERPL-MCNC: 8.6 MG/DL — SIGNIFICANT CHANGE UP (ref 8.4–10.4)
CHLORIDE SERPL-SCNC: 100 MMOL/L — SIGNIFICANT CHANGE UP (ref 98–110)
CO2 SERPL-SCNC: 25 MMOL/L — SIGNIFICANT CHANGE UP (ref 17–32)
CREAT SERPL-MCNC: 3.3 MG/DL — HIGH (ref 0.7–1.5)
EGFR: 13 ML/MIN/1.73M2 — LOW
GI PCR PANEL: SIGNIFICANT CHANGE UP
GLUCOSE SERPL-MCNC: 99 MG/DL — SIGNIFICANT CHANGE UP (ref 70–99)
HCT VFR BLD CALC: 31 % — LOW (ref 37–47)
HGB BLD-MCNC: 9.5 G/DL — LOW (ref 12–16)
MAGNESIUM SERPL-MCNC: 1.9 MG/DL — SIGNIFICANT CHANGE UP (ref 1.8–2.4)
MCHC RBC-ENTMCNC: 28.4 PG — SIGNIFICANT CHANGE UP (ref 27–31)
MCHC RBC-ENTMCNC: 30.6 G/DL — LOW (ref 32–37)
MCV RBC AUTO: 92.8 FL — SIGNIFICANT CHANGE UP (ref 81–99)
NRBC # BLD: 0 /100 WBCS — SIGNIFICANT CHANGE UP (ref 0–0)
PLATELET # BLD AUTO: 271 K/UL — SIGNIFICANT CHANGE UP (ref 130–400)
PMV BLD: 9.1 FL — SIGNIFICANT CHANGE UP (ref 7.4–10.4)
POTASSIUM SERPL-MCNC: 4.1 MMOL/L — SIGNIFICANT CHANGE UP (ref 3.5–5)
POTASSIUM SERPL-SCNC: 4.1 MMOL/L — SIGNIFICANT CHANGE UP (ref 3.5–5)
RBC # BLD: 3.34 M/UL — LOW (ref 4.2–5.4)
RBC # FLD: 14.2 % — SIGNIFICANT CHANGE UP (ref 11.5–14.5)
SODIUM SERPL-SCNC: 137 MMOL/L — SIGNIFICANT CHANGE UP (ref 135–146)
WBC # BLD: 7.92 K/UL — SIGNIFICANT CHANGE UP (ref 4.8–10.8)
WBC # FLD AUTO: 7.92 K/UL — SIGNIFICANT CHANGE UP (ref 4.8–10.8)

## 2024-04-14 PROCEDURE — 99232 SBSQ HOSP IP/OBS MODERATE 35: CPT

## 2024-04-14 RX ADMIN — APIXABAN 2.5 MILLIGRAM(S): 2.5 TABLET, FILM COATED ORAL at 06:11

## 2024-04-14 RX ADMIN — CHLORHEXIDINE GLUCONATE 1 APPLICATION(S): 213 SOLUTION TOPICAL at 06:14

## 2024-04-14 RX ADMIN — Medication 1 MILLIGRAM(S): at 12:08

## 2024-04-14 RX ADMIN — Medication 600 MILLIGRAM(S): at 17:36

## 2024-04-14 RX ADMIN — SERTRALINE 25 MILLIGRAM(S): 25 TABLET, FILM COATED ORAL at 12:08

## 2024-04-14 RX ADMIN — APIXABAN 2.5 MILLIGRAM(S): 2.5 TABLET, FILM COATED ORAL at 17:35

## 2024-04-14 RX ADMIN — Medication 600 MILLIGRAM(S): at 06:10

## 2024-04-14 RX ADMIN — CALCITRIOL 0.5 MICROGRAM(S): 0.5 CAPSULE ORAL at 17:35

## 2024-04-14 RX ADMIN — ATORVASTATIN CALCIUM 80 MILLIGRAM(S): 80 TABLET, FILM COATED ORAL at 22:24

## 2024-04-14 RX ADMIN — CALCITRIOL 0.5 MICROGRAM(S): 0.5 CAPSULE ORAL at 06:11

## 2024-04-14 RX ADMIN — Medication 50 MILLIGRAM(S): at 06:11

## 2024-04-14 NOTE — PROGRESS NOTE ADULT - TIME BILLING
Direct patient care. Discussed on rounds , reviewed previous chart
Direct patient care. Discussed on rounds with Housestaff
Direct patient care. Discussed on rounds with Housestaff
Direct patient care. Discussed  with Housestaff

## 2024-04-14 NOTE — PROGRESS NOTE ADULT - SUBJECTIVE AND OBJECTIVE BOX
Nephrology Progress Note    RACHEL SUMMERS  MRN-[id number]  88y  Female    S:  Patient is seen and examined, events over the last 24h noted.    O:  Allergies:  cephalosporins (Angioedema)  Keflex (Swelling)    Hospital Medications:   MEDICATIONS  (STANDING):  apixaban 2.5 milliGRAM(s) Oral every 12 hours  atorvastatin 80 milliGRAM(s) Oral at bedtime  calcitriol   Capsule 0.5 MICROGram(s) Oral every 12 hours  chlorhexidine 2% Cloths 1 Application(s) Topical <User Schedule>  darbepoetin Injectable Syringe 25 MICROGram(s) IV Push <User Schedule>  folic acid 1 milliGRAM(s) Oral daily  guaiFENesin  milliGRAM(s) Oral every 12 hours  metoprolol succinate ER 50 milliGRAM(s) Oral daily  sertraline 25 milliGRAM(s) Oral daily    MEDICATIONS  (PRN):  acetaminophen     Tablet .. 650 milliGRAM(s) Oral every 6 hours PRN Temp greater or equal to 38C (100.4F)  albuterol    90 MICROgram(s) HFA Inhaler 2 Puff(s) Inhalation every 6 hours PRN for shortness of breath and/or wheezing    Home Medications:  Albuterol (Eqv-Proventil HFA) 90 mcg/inh inhalation aerosol: 2 puff(s) inhaled every 6 hours as needed for  shortness of breath and/or wheezing (07 Apr 2024 14:38)  apixaban 2.5 mg oral tablet: 1 tab(s) orally 2 times a day (07 Apr 2024 14:38)  atorvastatin 80 mg oral tablet: 1 tab(s) orally once a day (at bedtime) (07 Apr 2024 14:38)  calcitriol 0.5 mcg oral capsule: 1 cap(s) orally 2 times a day (07 Apr 2024 14:38)  Entresto 24 mg-26 mg oral tablet: 1 tab(s) orally 2 times a day (07 Apr 2024 14:38)  ferrous sulfate 325 mg (65 mg elemental iron) oral tablet: 1 tab(s) orally every other day (07 Apr 2024 14:38)  folic acid 1 mg oral tablet: 1 tab(s) orally once a day (07 Apr 2024 14:38)  furosemide 40 mg oral tablet: 1 tab(s) orally once a day (07 Apr 2024 14:38)  Imdur 30 mg oral tablet, extended release: 1 tab(s) orally once a day (07 Apr 2024 14:38)  latanoprost 0.005% ophthalmic solution: 1 drop(s) in each eye 2 times a day (07 Apr 2024 14:38)  metoprolol succinate 50 mg oral capsule, extended release: 1 cap(s) orally once a day (07 Apr 2024 14:38)  olopatadine 0.2% ophthalmic solution: 1 drop(s) in each affected eye once a day (07 Apr 2024 14:38)  sertraline 25 mg oral tablet: 1 tab(s) orally once a day (07 Apr 2024 14:38)  Timolol Maleate, Ophthalmic 0.5% preservative-free ophthalmic solution: 1 drop(s) to both eyes 2 times a day (07 Apr 2024 14:38)      VITALS:  Daily     Daily   T(F): 97.7 (04-14-24 @ 07:00), Max: 98 (04-14-24 @ 06:16)  HR: 64 (04-14-24 @ 07:00)  BP: 128/64 (04-14-24 @ 07:00)  RR: 18 (04-14-24 @ 07:00)  SpO2: --  Wt(kg): --  I&O's Detail    13 Apr 2024 07:01  -  14 Apr 2024 07:00  --------------------------------------------------------  IN:  Total IN: 0 mL    OUT:    Other (mL): 1000 mL  Total OUT: 1000 mL    Total NET: -1000 mL        I&O's Summary    13 Apr 2024 07:01  -  14 Apr 2024 07:00  --------------------------------------------------------  IN: 0 mL / OUT: 1000 mL / NET: -1000 mL          PHYSICAL EXAM:  Gen: resting comfortably  Chest: b/l breath sounds  Abd: soft  Extremities: no edema  Vascular access: TDC      LABS:    04-14    137  |  100  |  13  ----------------------------<  99  4.1   |  25  |  3.3<H>    Ca    8.6      14 Apr 2024 06:27  Mg     1.9     04-14      Phosphorus: 2.7 mg/dL (04-12-24 @ 06:00)  Phosphorus: 2.9 mg/dL (04-11-24 @ 05:53)                          9.5    7.92  )-----------( 271      ( 14 Apr 2024 06:27 )             31.0     Mean Cell Volume: 92.8 fL (04-14-24 @ 06:27)    % Saturation, Iron: 22 % (04-04-24 @ 06:44)

## 2024-04-14 NOTE — PROGRESS NOTE ADULT - ASSESSMENT
87yo  female patient ([employer]ational) with hx of HTN, DVT/PE 2018 (thought provoked by travel) completed 6m of AC then 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, NICM - HFrEF(LVEF 40-45% in 9/22) s/p AICD Medtronic, RA, CKD stage 5 --> presented today for syncope.    # Diarrhea  - dc  ferrous sulphate  - lactose free diet  - F/u GI PCR    # Syncope sec to orthostatic Hypotension  # Right vertebral artery stenosis - moderate  - CT Angio Neck w/ IV Cont (04.07.24 @ 00:35) >Focal irregular moderate stenosis at the distal V2/proximal V3 segments of the right vertebral artery. Focal moderate stenosis of the proximal intracranial segment of the right vertebral artery.  - evaluated by neurology --> outpt F/u  -  TTE Echo Complete w/ Contrast w/o Doppler (04.09.24 @ 08:18) >Mildly decreased global left ventricular systolic function with a  EF of 45%. Mild (Grade I) diastolic dysfunction. Severe concentric left ventricular hypertrophy.Severely enlarged left atrium.Degenerative mitral valve with thickening and calcification of the anterior and posterior mitral valve leaflets and mitral annular calcification.Mild-moderate tricuspid regurgitation.mild pulmonary hypertension.  - AICD interrogated- no events  - Hold lasix, entresto, imdur  - c/w eliquis, statin    # CKD Stage 5  on HD  # Normocytic Anemia  # Hyponatremia- resolved  - aranesp and  venofer qHD  - c/w  calcitriol   - HD as per nephrology     # NICM, chr systolc and diastolic CHF S/p AICD  # Hypertension  - c/w  toprol  - Hold lasix, entresto, imdur    # H/o PE, DVT  - c/w eliquis    # DNR/ DNI/trial of NIV    # Pending:  F/u GI PCR  # Disposition: Home when stable

## 2024-04-14 NOTE — PROGRESS NOTE ADULT - ASSESSMENT
87yo  female patient (Taoism) with hx of HTN, DVT/PE 2018 (thought provoked by travel) completed 6m of AC then 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, NICM - HFrEF(LVEF 40-45% in 9/22) s/p AICD Medtronic, RA, ESRD on HD recently admitted 3/22 and discharged yesterday, for fluctuating mental status and abdominal pain found to have PNA, enteritis and pyelo complicated by OMERO and CKD, TDC placed by IR 4/1 and RRT done 4/1 and 4/2, discharged with o/p HD arranged.  Presented for syncope. Nephrology will be consulted for ESRD on HD.    ESRD on HD/ Syncope    - s/p HD yesterday, following TTS schedule  - Blood pressure was low, better off meds  - h/h noted, cont aranesp 25   iron stores noted, start venofer qHD weekly with HD   - ph on the low side / no binders    - C/w home calcitriol dose   will follow

## 2024-04-15 VITALS
SYSTOLIC BLOOD PRESSURE: 143 MMHG | HEART RATE: 60 BPM | RESPIRATION RATE: 18 BRPM | DIASTOLIC BLOOD PRESSURE: 74 MMHG | TEMPERATURE: 97 F

## 2024-04-15 LAB
ANION GAP SERPL CALC-SCNC: 11 MMOL/L — SIGNIFICANT CHANGE UP (ref 7–14)
BUN SERPL-MCNC: 18 MG/DL — SIGNIFICANT CHANGE UP (ref 10–20)
CALCIUM SERPL-MCNC: 8.6 MG/DL — SIGNIFICANT CHANGE UP (ref 8.4–10.5)
CHLORIDE SERPL-SCNC: 97 MMOL/L — LOW (ref 98–110)
CO2 SERPL-SCNC: 25 MMOL/L — SIGNIFICANT CHANGE UP (ref 17–32)
CREAT SERPL-MCNC: 4.2 MG/DL — CRITICAL HIGH (ref 0.7–1.5)
EGFR: 10 ML/MIN/1.73M2 — LOW
GLUCOSE SERPL-MCNC: 80 MG/DL — SIGNIFICANT CHANGE UP (ref 70–99)
HCT VFR BLD CALC: 28.7 % — LOW (ref 37–47)
HGB BLD-MCNC: 8.8 G/DL — LOW (ref 12–16)
MAGNESIUM SERPL-MCNC: 2 MG/DL — SIGNIFICANT CHANGE UP (ref 1.8–2.4)
MCHC RBC-ENTMCNC: 27.9 PG — SIGNIFICANT CHANGE UP (ref 27–31)
MCHC RBC-ENTMCNC: 30.7 G/DL — LOW (ref 32–37)
MCV RBC AUTO: 91.1 FL — SIGNIFICANT CHANGE UP (ref 81–99)
NRBC # BLD: 0 /100 WBCS — SIGNIFICANT CHANGE UP (ref 0–0)
PLATELET # BLD AUTO: 262 K/UL — SIGNIFICANT CHANGE UP (ref 130–400)
PMV BLD: 9.7 FL — SIGNIFICANT CHANGE UP (ref 7.4–10.4)
POTASSIUM SERPL-MCNC: 4.4 MMOL/L — SIGNIFICANT CHANGE UP (ref 3.5–5)
POTASSIUM SERPL-SCNC: 4.4 MMOL/L — SIGNIFICANT CHANGE UP (ref 3.5–5)
RBC # BLD: 3.15 M/UL — LOW (ref 4.2–5.4)
RBC # FLD: 14.2 % — SIGNIFICANT CHANGE UP (ref 11.5–14.5)
SODIUM SERPL-SCNC: 133 MMOL/L — LOW (ref 135–146)
WBC # BLD: 7.03 K/UL — SIGNIFICANT CHANGE UP (ref 4.8–10.8)
WBC # FLD AUTO: 7.03 K/UL — SIGNIFICANT CHANGE UP (ref 4.8–10.8)

## 2024-04-15 PROCEDURE — 99239 HOSP IP/OBS DSCHRG MGMT >30: CPT

## 2024-04-15 RX ORDER — ISOSORBIDE MONONITRATE 60 MG/1
1 TABLET, EXTENDED RELEASE ORAL
Refills: 0 | DISCHARGE

## 2024-04-15 RX ORDER — SACUBITRIL AND VALSARTAN 24; 26 MG/1; MG/1
1 TABLET, FILM COATED ORAL
Refills: 0 | DISCHARGE

## 2024-04-15 RX ORDER — IRON SUCROSE 20 MG/ML
100 INJECTION, SOLUTION INTRAVENOUS
Refills: 0 | Status: DISCONTINUED | OUTPATIENT
Start: 2024-04-15 | End: 2024-04-15

## 2024-04-15 RX ORDER — FERROUS SULFATE 325(65) MG
1 TABLET ORAL
Refills: 0 | DISCHARGE

## 2024-04-15 RX ORDER — FUROSEMIDE 40 MG
1 TABLET ORAL
Refills: 0 | DISCHARGE

## 2024-04-15 RX ADMIN — APIXABAN 2.5 MILLIGRAM(S): 2.5 TABLET, FILM COATED ORAL at 17:08

## 2024-04-15 RX ADMIN — CALCITRIOL 0.5 MICROGRAM(S): 0.5 CAPSULE ORAL at 06:01

## 2024-04-15 RX ADMIN — CALCITRIOL 0.5 MICROGRAM(S): 0.5 CAPSULE ORAL at 17:08

## 2024-04-15 RX ADMIN — Medication 1 MILLIGRAM(S): at 11:52

## 2024-04-15 RX ADMIN — Medication 50 MILLIGRAM(S): at 06:00

## 2024-04-15 RX ADMIN — Medication 600 MILLIGRAM(S): at 06:00

## 2024-04-15 RX ADMIN — CHLORHEXIDINE GLUCONATE 1 APPLICATION(S): 213 SOLUTION TOPICAL at 06:03

## 2024-04-15 RX ADMIN — SERTRALINE 25 MILLIGRAM(S): 25 TABLET, FILM COATED ORAL at 11:52

## 2024-04-15 RX ADMIN — APIXABAN 2.5 MILLIGRAM(S): 2.5 TABLET, FILM COATED ORAL at 05:59

## 2024-04-15 RX ADMIN — Medication 600 MILLIGRAM(S): at 17:08

## 2024-04-15 NOTE — PROGRESS NOTE ADULT - ASSESSMENT
89yo  female patient (Buddhism) with hx of HTN, DVT/PE 2018, NICM - HFrEF(LVEF 40-45% in 9/22) s/p AICD Medtronic, RA, CKD stage 5 recently admitted 3/22  presented for syncope.     #Syncope, Orthostatic +  - VS stable on admission  - Labs on admission --> Hb 10.3 (BL), Trop 96>91, BUN/Cr 17/2.6  - Imaging on admission --> CTH w/ nothing acute. CTA Neck w/ focal irregular stenosis of L VA and focal moderate stenosis of R VA. CTAP w/ nothing acute.   - Admitted to Telemetry for Syncope  - Telemetry Course: Neuroendovascular recommended OP F/U. TTE w/ EF 45%, G1DD, Sev LVH, Sev LAE, Deg MV w/ thickening, mild MV, mild-mod TR, mild PH, no effusion. EP interrogated device w/o any events. CXR and RVP negative. Orthostatics positive. No events on Tele. PT recommended STR but family refuses and Pt will be going home on 4/11. Tele DC'd and Pt transferred to .   - 4/15: orthostatics +. STAN stocking/abdominal binder --> orthostatics to be repeated. If neg, clear for dc    #CKD-V, Recently Started on HD  #Anemia - stable  - Follows with Dr. Urrutia, OP HD spot at [address]   - Patient is Buddhism, will not accept blood products  - Sat 22% Ferritin 700, may receive VEnofer 200 mg IV x 2 doses  - Telemetry Course: Nephro on board for HD, last session 4/9, next session 4/11. Held Home Imdur, Entresto and Lasix, F/U w/ Nephro regarding reintroducing these.   - 4/11: S/p HD.    #NICM - HFrEF(LVEF 35-40% in 9/22)   #s/p AICD MEdtronic  #HTN  - Follows OP with Dr. Mckinney  - TTE at [facility] 3/2024: EF 45-50%, GIIDD  - Home Imdur, Entresto & Lasix held in setting of syncope  - Continued Home Toprol 50mg QD    #Constipation - resolved  #Diarrhea - resolved  #Dyspepsia  - 4/12: Pt complaining of diarrhea. Miralax and Senna/ iron DC'd.  - Continued Pepcid  - GI PCR neg    # DVT/PE  - Continued Home Eliquis 2.5mg BID    #RA  - Not on home meds

## 2024-04-15 NOTE — PROGRESS NOTE ADULT - SUBJECTIVE AND OBJECTIVE BOX
24H events:    Patient is a 88y old Female who presents with a chief complaint of syncope (14 Apr 2024 14:00)    Primary diagnosis of Syncope    Today is hospital day 8d. This morning patient was seen and examined at bedside, resting comfortably in bed.    No acute or major events overnight. Denies diarrhea    Code Status: DNR/DNI    PAST MEDICAL & SURGICAL HISTORY  Hypertension    Gout    Diverticulitis  sigmoid    Rheumatoid arthritis    DVT, lower extremity  Bilateral    Pulmonary embolism    Chronic HFrEF (heart failure with reduced ejection fraction)    AICD (automatic cardioverter/defibrillator) present    ESRD on dialysis    Artificial pacemaker    History of appendectomy    History of tonsillectomy    History of hysterectomy    H/O hernia repair      SOCIAL HISTORY:  Social History:      ALLERGIES:  cephalosporins (Angioedema)  Keflex (Swelling)    MEDICATIONS:  STANDING MEDICATIONS  apixaban 2.5 milliGRAM(s) Oral every 12 hours  atorvastatin 80 milliGRAM(s) Oral at bedtime  calcitriol   Capsule 0.5 MICROGram(s) Oral every 12 hours  chlorhexidine 2% Cloths 1 Application(s) Topical <User Schedule>  darbepoetin Injectable Syringe 25 MICROGram(s) IV Push <User Schedule>  folic acid 1 milliGRAM(s) Oral daily  guaiFENesin  milliGRAM(s) Oral every 12 hours  metoprolol succinate ER 50 milliGRAM(s) Oral daily  sertraline 25 milliGRAM(s) Oral daily    PRN MEDICATIONS  acetaminophen     Tablet .. 650 milliGRAM(s) Oral every 6 hours PRN  albuterol    90 MICROgram(s) HFA Inhaler 2 Puff(s) Inhalation every 6 hours PRN    VITALS:   T(F): 98  HR: 61  BP: 139/63  RR: 18  SpO2: --    PHYSICAL EXAM:  GENERAL:   ( x ) NAD, lying in bed comfortably     (  ) obtunded     (  ) lethargic     (  ) somnolent    HEAD:   ( x ) Atraumatic     (  ) hematoma     (  ) laceration (specify location:       )     NECK:  ( x ) Supple     (  ) neck stiffness     (  ) nuchal rigidity     (  )  no JVD     (  ) JVD present ( -- cm)    HEART:  Rate -->     ( x ) normal rate     (  ) bradycardic     (  ) tachycardic  Rhythm -->     ( x ) regular     (  ) regularly irregular     (  ) irregularly irregular  Murmurs -->     ( x ) normal s1s2     (  ) systolic murmur     (  ) diastolic murmur     (  ) continuous murmur      (  ) S3 present     (  ) S4 present    LUNGS:   ( x )Unlabored respirations     (  ) tachypnea  ( x ) B/L air entry     (  ) decreased breath sounds in:  (location     )    ( x ) no adventitious sound     (  ) crackles     (  ) wheezing      (  ) rhonchi      (specify location:       )  (  ) chest wall tenderness (specify location:       )    ABDOMEN:   (x  ) Soft     (  ) tense   |   ( x ) nondistended     (  ) distended   |   ( x ) +BS     (  ) hypoactive bowel sounds     (  ) hyperactive bowel sounds  ( x ) nontender     (  ) RUQ tenderness     (  ) RLQ tenderness     (  ) LLQ tenderness     (  ) epigastric tenderness     (  ) diffuse tenderness  (  ) Splenomegaly      (  ) Hepatomegaly      (  ) Jaundice     (  ) ecchymosis     EXTREMITIES:  (x  ) Normal     (  ) Rash     (  ) ecchymosis     (  ) varicose veins      (  ) pitting edema     (  ) non-pitting edema   (  ) ulceration     (  ) gangrene:     (location:     )    NERVOUS SYSTEM:    ( x ) A&Ox3     (  ) confused     (  ) lethargic  CN II-XII:     (x  ) Intact     (  ) deficits found     (Specify:     )   Upper extremities:     (x  ) no sensorimotor deficits     (  ) weakness     (  ) loss of proprioception/vibration     (  ) loss of touch/temperature (specify:    )  Lower extremities:     ( x ) no sensorimotor deficits     (  ) weakness     (  ) loss of proprioception/vibration     (  ) loss of touch/temperature (specify:    )    SKIN:   ( x ) No rashes or lesions     (  ) maculopapular rash     (  ) pustules     (  ) vesicles     (  ) ulcer     (  ) ecchymosis     (specify location:     )    LABS:                        8.8    7.03  )-----------( 262      ( 15 Apr 2024 05:37 )             28.7     04-15    133<L>  |  97<L>  |  18  ----------------------------<  80  4.4   |  25  |  4.2<HH>    Ca    8.6      15 Apr 2024 05:37  Mg     2.0     04-15        Urinalysis Basic - ( 15 Apr 2024 05:37 )    Color: x / Appearance: x / SG: x / pH: x  Gluc: 80 mg/dL / Ketone: x  / Bili: x / Urobili: x   Blood: x / Protein: x / Nitrite: x   Leuk Esterase: x / RBC: x / WBC x   Sq Epi: x / Non Sq Epi: x / Bacteria: x                RADIOLOGY:

## 2024-04-15 NOTE — PROGRESS NOTE ADULT - PROVIDER SPECIALTY LIST ADULT
Hospitalist
Nephrology
Nephrology
Hospitalist
Hospitalist
Internal Medicine
Nephrology
Internal Medicine
Internal Medicine
Nephrology
Hospitalist
Hospitalist

## 2024-04-15 NOTE — PROGRESS NOTE ADULT - ASSESSMENT
87yo  female patient (Denominational) with hx of HTN, DVT/PE 2018 (thought provoked by travel) completed 6m of AC then 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, NICM - HFrEF(LVEF 40-45% in 9/22) s/p AICD Medtronic, RA, ESRD on HD recently admitted 3/22 and discharged yesterday, for fluctuating mental status and abdominal pain found to have PNA, enteritis and pyelo complicated by OMERO and CKD, TDC placed by IR 4/1 and RRT done 4/1 and 4/2, discharged with o/p HD arranged.  Presented for syncope. Nephrology will be consulted for ESRD on HD.    ESRD on HD/ Syncope    - cont HD following TTS schedule, next HD tomorrow  - BP stable, off anti-hypertensives  - h/h noted, cont aranesp 25, start venofer qHD   - ph on the low side / no binders    - C/w home calcitriol dose   will follow

## 2024-04-15 NOTE — PROGRESS NOTE ADULT - SUBJECTIVE AND OBJECTIVE BOX
Nephrology Progress Note    RACHEL SUMMERS  MRN-[id number]  88y  Female    S:  Patient is seen and examined, events over the last 24h noted.    O:  Allergies:  cephalosporins (Angioedema)  Keflex (Swelling)    Hospital Medications:   MEDICATIONS  (STANDING):  apixaban 2.5 milliGRAM(s) Oral every 12 hours  atorvastatin 80 milliGRAM(s) Oral at bedtime  calcitriol   Capsule 0.5 MICROGram(s) Oral every 12 hours  chlorhexidine 2% Cloths 1 Application(s) Topical <User Schedule>  darbepoetin Injectable Syringe 25 MICROGram(s) IV Push <User Schedule>  folic acid 1 milliGRAM(s) Oral daily  guaiFENesin  milliGRAM(s) Oral every 12 hours  metoprolol succinate ER 50 milliGRAM(s) Oral daily  sertraline 25 milliGRAM(s) Oral daily    MEDICATIONS  (PRN):  acetaminophen     Tablet .. 650 milliGRAM(s) Oral every 6 hours PRN Temp greater or equal to 38C (100.4F)  albuterol    90 MICROgram(s) HFA Inhaler 2 Puff(s) Inhalation every 6 hours PRN for shortness of breath and/or wheezing    Home Medications:  Albuterol (Eqv-Proventil HFA) 90 mcg/inh inhalation aerosol: 2 puff(s) inhaled every 6 hours as needed for  shortness of breath and/or wheezing (07 Apr 2024 14:38)  apixaban 2.5 mg oral tablet: 1 tab(s) orally 2 times a day (07 Apr 2024 14:38)  atorvastatin 80 mg oral tablet: 1 tab(s) orally once a day (at bedtime) (07 Apr 2024 14:38)  calcitriol 0.5 mcg oral capsule: 1 cap(s) orally 2 times a day (07 Apr 2024 14:38)  ferrous sulfate 325 mg (65 mg elemental iron) oral tablet: 1 tab(s) orally every other day (07 Apr 2024 14:38)  folic acid 1 mg oral tablet: 1 tab(s) orally once a day (07 Apr 2024 14:38)  latanoprost 0.005% ophthalmic solution: 1 drop(s) in each eye 2 times a day (07 Apr 2024 14:38)  metoprolol succinate 50 mg oral capsule, extended release: 1 cap(s) orally once a day (07 Apr 2024 14:38)  olopatadine 0.2% ophthalmic solution: 1 drop(s) in each affected eye once a day (07 Apr 2024 14:38)  sertraline 25 mg oral tablet: 1 tab(s) orally once a day (07 Apr 2024 14:38)  Timolol Maleate, Ophthalmic 0.5% preservative-free ophthalmic solution: 1 drop(s) to both eyes 2 times a day (07 Apr 2024 14:38)      VITALS:  Daily     Daily   T(F): 98 (04-15-24 @ 00:42), Max: 98 (04-15-24 @ 00:42)  HR: 61 (04-15-24 @ 06:03)  BP: 139/63 (04-15-24 @ 06:03)  RR: 18 (04-15-24 @ 00:42)  SpO2: --  Wt(kg): --  I&O's Detail    I&O's Summary        PHYSICAL EXAM:  Gen: ill appearing, in NAD   Chest: b/l breath sounds  Abd: soft  Extremities: no edema  Vascular access: TDC      LABS:    04-15    133<L>  |  97<L>  |  18  ----------------------------<  80  4.4   |  25  |  4.2<HH>    Ca    8.6      15 Apr 2024 05:37  Mg     2.0     04-15      Phosphorus: 2.7 mg/dL (04-12-24 @ 06:00)  Phosphorus: 2.9 mg/dL (04-11-24 @ 05:53)                            8.8    7.03  )-----------( 262      ( 15 Apr 2024 05:37 )             28.7     Mean Cell Volume: 91.1 fL (04-15-24 @ 05:37)    % Saturation, Iron: 22 % (04-04-24 @ 06:44)

## 2024-04-17 ENCOUNTER — EMERGENCY (EMERGENCY)
Facility: HOSPITAL | Age: 89
LOS: 0 days | Discharge: HOME | End: 2024-04-17
Attending: EMERGENCY MEDICINE
Payer: MEDICARE

## 2024-04-17 VITALS
WEIGHT: 100.09 LBS | SYSTOLIC BLOOD PRESSURE: 120 MMHG | OXYGEN SATURATION: 97 % | HEIGHT: 63 IN | RESPIRATION RATE: 18 BRPM | HEART RATE: 62 BPM | TEMPERATURE: 98 F | DIASTOLIC BLOOD PRESSURE: 57 MMHG

## 2024-04-17 VITALS
OXYGEN SATURATION: 98 % | DIASTOLIC BLOOD PRESSURE: 64 MMHG | RESPIRATION RATE: 17 BRPM | TEMPERATURE: 98 F | HEART RATE: 65 BPM | SYSTOLIC BLOOD PRESSURE: 130 MMHG

## 2024-04-17 DIAGNOSIS — Z98.890 OTHER SPECIFIED POSTPROCEDURAL STATES: Chronic | ICD-10-CM

## 2024-04-17 DIAGNOSIS — Z90.710 ACQUIRED ABSENCE OF BOTH CERVIX AND UTERUS: Chronic | ICD-10-CM

## 2024-04-17 DIAGNOSIS — N18.6 END STAGE RENAL DISEASE: ICD-10-CM

## 2024-04-17 DIAGNOSIS — K62.5 HEMORRHAGE OF ANUS AND RECTUM: ICD-10-CM

## 2024-04-17 DIAGNOSIS — Z86.711 PERSONAL HISTORY OF PULMONARY EMBOLISM: ICD-10-CM

## 2024-04-17 DIAGNOSIS — Z90.89 ACQUIRED ABSENCE OF OTHER ORGANS: Chronic | ICD-10-CM

## 2024-04-17 DIAGNOSIS — Z88.1 ALLERGY STATUS TO OTHER ANTIBIOTIC AGENTS STATUS: ICD-10-CM

## 2024-04-17 DIAGNOSIS — I50.9 HEART FAILURE, UNSPECIFIED: ICD-10-CM

## 2024-04-17 DIAGNOSIS — M06.9 RHEUMATOID ARTHRITIS, UNSPECIFIED: ICD-10-CM

## 2024-04-17 DIAGNOSIS — Z95.810 PRESENCE OF AUTOMATIC (IMPLANTABLE) CARDIAC DEFIBRILLATOR: ICD-10-CM

## 2024-04-17 DIAGNOSIS — Z95.0 PRESENCE OF CARDIAC PACEMAKER: Chronic | ICD-10-CM

## 2024-04-17 DIAGNOSIS — Z79.01 LONG TERM (CURRENT) USE OF ANTICOAGULANTS: ICD-10-CM

## 2024-04-17 DIAGNOSIS — Z90.49 ACQUIRED ABSENCE OF OTHER SPECIFIED PARTS OF DIGESTIVE TRACT: Chronic | ICD-10-CM

## 2024-04-17 DIAGNOSIS — Z86.718 PERSONAL HISTORY OF OTHER VENOUS THROMBOSIS AND EMBOLISM: ICD-10-CM

## 2024-04-17 DIAGNOSIS — I13.2 HYPERTENSIVE HEART AND CHRONIC KIDNEY DISEASE WITH HEART FAILURE AND WITH STAGE 5 CHRONIC KIDNEY DISEASE, OR END STAGE RENAL DISEASE: ICD-10-CM

## 2024-04-17 DIAGNOSIS — N39.0 URINARY TRACT INFECTION, SITE NOT SPECIFIED: ICD-10-CM

## 2024-04-17 DIAGNOSIS — Z99.2 DEPENDENCE ON RENAL DIALYSIS: ICD-10-CM

## 2024-04-17 LAB
ALBUMIN SERPL ELPH-MCNC: 3.1 G/DL — LOW (ref 3.5–5.2)
ALP SERPL-CCNC: 142 U/L — HIGH (ref 30–115)
ALT FLD-CCNC: 24 U/L — SIGNIFICANT CHANGE UP (ref 0–41)
ANION GAP SERPL CALC-SCNC: 8 MMOL/L — SIGNIFICANT CHANGE UP (ref 7–14)
APPEARANCE UR: ABNORMAL
AST SERPL-CCNC: 45 U/L — HIGH (ref 0–41)
BASOPHILS # BLD AUTO: 0.02 K/UL — SIGNIFICANT CHANGE UP (ref 0–0.2)
BASOPHILS NFR BLD AUTO: 0.3 % — SIGNIFICANT CHANGE UP (ref 0–1)
BILIRUB SERPL-MCNC: 0.2 MG/DL — SIGNIFICANT CHANGE UP (ref 0.2–1.2)
BILIRUB UR-MCNC: NEGATIVE — SIGNIFICANT CHANGE UP
BUN SERPL-MCNC: 11 MG/DL — SIGNIFICANT CHANGE UP (ref 10–20)
CALCIUM SERPL-MCNC: 8.9 MG/DL — SIGNIFICANT CHANGE UP (ref 8.4–10.5)
CHLORIDE SERPL-SCNC: 99 MMOL/L — SIGNIFICANT CHANGE UP (ref 98–110)
CO2 SERPL-SCNC: 29 MMOL/L — SIGNIFICANT CHANGE UP (ref 17–32)
COLOR SPEC: SIGNIFICANT CHANGE UP
CREAT SERPL-MCNC: 3 MG/DL — HIGH (ref 0.7–1.5)
DIFF PNL FLD: ABNORMAL
EGFR: 14 ML/MIN/1.73M2 — LOW
EOSINOPHIL # BLD AUTO: 0.21 K/UL — SIGNIFICANT CHANGE UP (ref 0–0.7)
EOSINOPHIL NFR BLD AUTO: 2.8 % — SIGNIFICANT CHANGE UP (ref 0–8)
GLUCOSE SERPL-MCNC: 134 MG/DL — HIGH (ref 70–99)
GLUCOSE UR QL: NEGATIVE MG/DL — SIGNIFICANT CHANGE UP
HCT VFR BLD CALC: 26.1 % — LOW (ref 37–47)
HGB BLD-MCNC: 7.9 G/DL — LOW (ref 12–16)
IMM GRANULOCYTES NFR BLD AUTO: 2.9 % — HIGH (ref 0.1–0.3)
KETONES UR-MCNC: ABNORMAL MG/DL
LACTATE SERPL-SCNC: 1.5 MMOL/L — SIGNIFICANT CHANGE UP (ref 0.7–2)
LEUKOCYTE ESTERASE UR-ACNC: ABNORMAL
LIDOCAIN IGE QN: 61 U/L — HIGH (ref 7–60)
LYMPHOCYTES # BLD AUTO: 2.14 K/UL — SIGNIFICANT CHANGE UP (ref 1.2–3.4)
LYMPHOCYTES # BLD AUTO: 28.4 % — SIGNIFICANT CHANGE UP (ref 20.5–51.1)
MCHC RBC-ENTMCNC: 28.2 PG — SIGNIFICANT CHANGE UP (ref 27–31)
MCHC RBC-ENTMCNC: 30.3 G/DL — LOW (ref 32–37)
MCV RBC AUTO: 93.2 FL — SIGNIFICANT CHANGE UP (ref 81–99)
MONOCYTES # BLD AUTO: 1.12 K/UL — HIGH (ref 0.1–0.6)
MONOCYTES NFR BLD AUTO: 14.9 % — HIGH (ref 1.7–9.3)
NEUTROPHILS # BLD AUTO: 3.82 K/UL — SIGNIFICANT CHANGE UP (ref 1.4–6.5)
NEUTROPHILS NFR BLD AUTO: 50.7 % — SIGNIFICANT CHANGE UP (ref 42.2–75.2)
NITRITE UR-MCNC: NEGATIVE — SIGNIFICANT CHANGE UP
NRBC # BLD: 0 /100 WBCS — SIGNIFICANT CHANGE UP (ref 0–0)
PH UR: 6.5 — SIGNIFICANT CHANGE UP (ref 5–8)
PLATELET # BLD AUTO: 223 K/UL — SIGNIFICANT CHANGE UP (ref 130–400)
PMV BLD: 9.6 FL — SIGNIFICANT CHANGE UP (ref 7.4–10.4)
POTASSIUM SERPL-MCNC: 4 MMOL/L — SIGNIFICANT CHANGE UP (ref 3.5–5)
POTASSIUM SERPL-SCNC: 4 MMOL/L — SIGNIFICANT CHANGE UP (ref 3.5–5)
PROT SERPL-MCNC: 5.7 G/DL — LOW (ref 6–8)
PROT UR-MCNC: >=1000 MG/DL
RBC # BLD: 2.8 M/UL — LOW (ref 4.2–5.4)
RBC # FLD: 14.2 % — SIGNIFICANT CHANGE UP (ref 11.5–14.5)
SODIUM SERPL-SCNC: 136 MMOL/L — SIGNIFICANT CHANGE UP (ref 135–146)
SP GR SPEC: 1.02 — SIGNIFICANT CHANGE UP (ref 1–1.03)
UROBILINOGEN FLD QL: 1 MG/DL — SIGNIFICANT CHANGE UP (ref 0.2–1)
WBC # BLD: 7.53 K/UL — SIGNIFICANT CHANGE UP (ref 4.8–10.8)
WBC # FLD AUTO: 7.53 K/UL — SIGNIFICANT CHANGE UP (ref 4.8–10.8)

## 2024-04-17 PROCEDURE — 99284 EMERGENCY DEPT VISIT MOD MDM: CPT | Mod: FS

## 2024-04-17 PROCEDURE — 83690 ASSAY OF LIPASE: CPT

## 2024-04-17 PROCEDURE — 87186 SC STD MICRODIL/AGAR DIL: CPT

## 2024-04-17 PROCEDURE — 85025 COMPLETE CBC W/AUTO DIFF WBC: CPT

## 2024-04-17 PROCEDURE — 74176 CT ABD & PELVIS W/O CONTRAST: CPT | Mod: 26,MC

## 2024-04-17 PROCEDURE — 83605 ASSAY OF LACTIC ACID: CPT

## 2024-04-17 PROCEDURE — 81001 URINALYSIS AUTO W/SCOPE: CPT

## 2024-04-17 PROCEDURE — 99284 EMERGENCY DEPT VISIT MOD MDM: CPT | Mod: 25

## 2024-04-17 PROCEDURE — 80053 COMPREHEN METABOLIC PANEL: CPT

## 2024-04-17 PROCEDURE — 74176 CT ABD & PELVIS W/O CONTRAST: CPT | Mod: MC

## 2024-04-17 PROCEDURE — 87086 URINE CULTURE/COLONY COUNT: CPT

## 2024-04-17 RX ORDER — AMOXICILLIN 250 MG/5ML
1 SUSPENSION, RECONSTITUTED, ORAL (ML) ORAL
Qty: 20 | Refills: 0
Start: 2024-04-17 | End: 2024-04-26

## 2024-04-17 RX ORDER — NITROFURANTOIN MACROCRYSTAL 50 MG
1 CAPSULE ORAL
Qty: 14 | Refills: 0
Start: 2024-04-17 | End: 2024-04-23

## 2024-04-17 NOTE — ED PROVIDER NOTE - OBJECTIVE STATEMENT
pt with pmhx AICD, ESRD on HD, HTN, DVT/PE on Eliquis, CHF, RA, CKD brought to ED by family after home aide/RN noticed blood in stool/diaper. she was just DC 2 days ago, refused STR. Denies fever/chill/HA/dizziness/chest pain/palpitation/sob/abd pain/n/v/d/urinary sxs

## 2024-04-17 NOTE — ED PROVIDER NOTE - ATTENDING APP SHARED VISIT CONTRIBUTION OF CARE
88-year-old female with past ministry of hypertension, DVT PE on Eliquis, heart failure, remote arthritis, CKD presents to the emergency department for concern for possible blood in her stool.  Patient is having mild abdominal pain.  No nausea no vomiting no diarrhea.  Patient was just discharged from hospital 2 days ago.    Const: No apparent distress  Eyes: PERRL, no conjunctival injection  HENT:  Neck supple without meningismus   CV: RRR, Warm, well-perfused extremities  RESP: CTA B/L, no tachypnea   GI: soft, non-tender, non-distended  MSK: No gross deformities appreciated  Skin: Warm, dry. No rashes  Neuro: Alert,

## 2024-04-17 NOTE — ED PROVIDER NOTE - PATIENT PORTAL LINK FT
You can access the FollowMyHealth Patient Portal offered by [facility] by registering at the following website: [email or web address]. By joining [employer]’s FollowMyHealth portal, you will also be able to view your health information using other applications (apps) compatible with our system.

## 2024-04-17 NOTE — ED PROVIDER NOTE - CLINICAL SUMMARY MEDICAL DECISION MAKING FREE TEXT BOX
88-year-old female presents emergency department needing clearance for stool.  Labs at baseline. Patient found to have UTI. DC home with antibiotics.

## 2024-04-17 NOTE — ED PROVIDER NOTE - PROGRESS NOTE DETAILS
Discussed possible side effects of abx. including but not limited to cdiff/resistance/GI upset. if intolerance, dc use and return to office . Also if there is no improvement, return for re-evaluation. advised to take probiotics.    d/w daughter marcella over the phone, all ?s abs

## 2024-04-17 NOTE — ED PROVIDER NOTE - PHYSICAL EXAMINATION
CONSTITUTIONAL: Well-appearing; well-nourished; in no apparent distress.   NECK: Supple; non-tender; no cervical lymphadenopathy.   CARDIOVASCULAR: Normal S1, S2; no murmurs, rubs, or gallops.   RESPIRATORY: Normal chest excursion with respiration; breath sounds clear and equal bilaterally; no wheezes, rhonchi, or rales.  GI/: mild ttp; Non-distended; no palpable organomegaly.   MS: No evidence of trauma or deformity. No CVA tenderness. Normal ROM in all four extremities; non-tender to palpation; distal pulses are normal.   SKIN: Normal for age and race; warm; dry; good turgor; no apparent lesions or exudate.   NEURO/PSYCH: A & O x 4; grossly unremarkable. mood and manner are appropriate.

## 2024-04-17 NOTE — ED ADULT NURSE NOTE - NSFALLRISKINTERV_ED_ALL_ED

## 2024-04-19 RX ORDER — ISOSORBIDE MONONITRATE 30 MG/1
30 TABLET, EXTENDED RELEASE ORAL DAILY
Qty: 30 | Refills: 3 | Status: DISCONTINUED | COMMUNITY
Start: 2022-03-23 | End: 2024-04-19

## 2024-04-19 RX ORDER — SACUBITRIL AND VALSARTAN 24; 26 MG/1; MG/1
24-26 TABLET, FILM COATED ORAL
Qty: 60 | Refills: 6 | Status: DISCONTINUED | COMMUNITY
Start: 2024-03-21 | End: 2024-04-19

## 2024-04-19 RX ORDER — HYDRALAZINE HYDROCHLORIDE 25 MG/1
25 TABLET ORAL TWICE DAILY
Refills: 0 | Status: DISCONTINUED | COMMUNITY
End: 2024-04-19

## 2024-04-19 RX ORDER — FUROSEMIDE 40 MG/1
40 TABLET ORAL DAILY
Qty: 30 | Refills: 6 | Status: DISCONTINUED | COMMUNITY
Start: 2023-07-14 | End: 2024-04-19

## 2024-04-19 RX ORDER — AMLODIPINE BESYLATE 5 MG/1
5 TABLET ORAL DAILY
Refills: 0 | Status: DISCONTINUED | COMMUNITY
End: 2024-04-19

## 2024-04-19 RX ORDER — VALSARTAN 40 MG/1
40 TABLET, COATED ORAL DAILY
Qty: 90 | Refills: 3 | Status: DISCONTINUED | COMMUNITY
End: 2024-04-19

## 2024-04-20 LAB
-  AMOXICILLIN/CLAVULANIC ACID: SIGNIFICANT CHANGE UP
-  AMPICILLIN/SULBACTAM: SIGNIFICANT CHANGE UP
-  AMPICILLIN: SIGNIFICANT CHANGE UP
-  AZTREONAM: SIGNIFICANT CHANGE UP
-  CEFAZOLIN: SIGNIFICANT CHANGE UP
-  CEFEPIME: SIGNIFICANT CHANGE UP
-  CEFOXITIN: SIGNIFICANT CHANGE UP
-  CEFTRIAXONE: SIGNIFICANT CHANGE UP
-  CEFUROXIME: SIGNIFICANT CHANGE UP
-  CIPROFLOXACIN: SIGNIFICANT CHANGE UP
-  ERTAPENEM: SIGNIFICANT CHANGE UP
-  GENTAMICIN: SIGNIFICANT CHANGE UP
-  IMIPENEM: SIGNIFICANT CHANGE UP
-  LEVOFLOXACIN: SIGNIFICANT CHANGE UP
-  MEROPENEM: SIGNIFICANT CHANGE UP
-  NITROFURANTOIN: SIGNIFICANT CHANGE UP
-  PIPERACILLIN/TAZOBACTAM: SIGNIFICANT CHANGE UP
-  TOBRAMYCIN: SIGNIFICANT CHANGE UP
-  TRIMETHOPRIM/SULFAMETHOXAZOLE: SIGNIFICANT CHANGE UP
CULTURE RESULTS: ABNORMAL
METHOD TYPE: SIGNIFICANT CHANGE UP
ORGANISM # SPEC MICROSCOPIC CNT: ABNORMAL
ORGANISM # SPEC MICROSCOPIC CNT: SIGNIFICANT CHANGE UP
SPECIMEN SOURCE: SIGNIFICANT CHANGE UP

## 2024-04-23 DIAGNOSIS — I13.2 HYPERTENSIVE HEART AND CHRONIC KIDNEY DISEASE WITH HEART FAILURE AND WITH STAGE 5 CHRONIC KIDNEY DISEASE, OR END STAGE RENAL DISEASE: ICD-10-CM

## 2024-04-23 DIAGNOSIS — I42.8 OTHER CARDIOMYOPATHIES: ICD-10-CM

## 2024-04-23 DIAGNOSIS — Z66 DO NOT RESUSCITATE: ICD-10-CM

## 2024-04-23 DIAGNOSIS — K59.00 CONSTIPATION, UNSPECIFIED: ICD-10-CM

## 2024-04-23 DIAGNOSIS — N18.6 END STAGE RENAL DISEASE: ICD-10-CM

## 2024-04-23 DIAGNOSIS — I27.20 PULMONARY HYPERTENSION, UNSPECIFIED: ICD-10-CM

## 2024-04-23 DIAGNOSIS — R10.13 EPIGASTRIC PAIN: ICD-10-CM

## 2024-04-23 DIAGNOSIS — Z86.711 PERSONAL HISTORY OF PULMONARY EMBOLISM: ICD-10-CM

## 2024-04-23 DIAGNOSIS — Z11.52 ENCOUNTER FOR SCREENING FOR COVID-19: ICD-10-CM

## 2024-04-23 DIAGNOSIS — Z88.1 ALLERGY STATUS TO OTHER ANTIBIOTIC AGENTS STATUS: ICD-10-CM

## 2024-04-23 DIAGNOSIS — M06.9 RHEUMATOID ARTHRITIS, UNSPECIFIED: ICD-10-CM

## 2024-04-23 DIAGNOSIS — Z86.718 PERSONAL HISTORY OF OTHER VENOUS THROMBOSIS AND EMBOLISM: ICD-10-CM

## 2024-04-23 DIAGNOSIS — Z99.2 DEPENDENCE ON RENAL DIALYSIS: ICD-10-CM

## 2024-04-23 DIAGNOSIS — M10.9 GOUT, UNSPECIFIED: ICD-10-CM

## 2024-04-23 DIAGNOSIS — I50.42 CHRONIC COMBINED SYSTOLIC (CONGESTIVE) AND DIASTOLIC (CONGESTIVE) HEART FAILURE: ICD-10-CM

## 2024-04-23 DIAGNOSIS — Z90.710 ACQUIRED ABSENCE OF BOTH CERVIX AND UTERUS: ICD-10-CM

## 2024-04-23 DIAGNOSIS — Z79.01 LONG TERM (CURRENT) USE OF ANTICOAGULANTS: ICD-10-CM

## 2024-04-23 DIAGNOSIS — Z79.899 OTHER LONG TERM (CURRENT) DRUG THERAPY: ICD-10-CM

## 2024-04-23 DIAGNOSIS — Z90.49 ACQUIRED ABSENCE OF OTHER SPECIFIED PARTS OF DIGESTIVE TRACT: ICD-10-CM

## 2024-04-23 DIAGNOSIS — E87.1 HYPO-OSMOLALITY AND HYPONATREMIA: ICD-10-CM

## 2024-04-23 DIAGNOSIS — I08.1 RHEUMATIC DISORDERS OF BOTH MITRAL AND TRICUSPID VALVES: ICD-10-CM

## 2024-04-23 DIAGNOSIS — Z45.02 ENCOUNTER FOR ADJUSTMENT AND MANAGEMENT OF AUTOMATIC IMPLANTABLE CARDIAC DEFIBRILLATOR: ICD-10-CM

## 2024-04-23 DIAGNOSIS — I65.01 OCCLUSION AND STENOSIS OF RIGHT VERTEBRAL ARTERY: ICD-10-CM

## 2024-04-23 DIAGNOSIS — R19.7 DIARRHEA, UNSPECIFIED: ICD-10-CM

## 2024-04-23 DIAGNOSIS — I95.1 ORTHOSTATIC HYPOTENSION: ICD-10-CM

## 2024-04-23 DIAGNOSIS — D63.1 ANEMIA IN CHRONIC KIDNEY DISEASE: ICD-10-CM

## 2024-05-03 LAB
BASOPHILS # BLD AUTO: 0.03 K/UL
BASOPHILS NFR BLD AUTO: 0.4 %
EOSINOPHIL # BLD AUTO: 0.38 K/UL
EOSINOPHIL NFR BLD AUTO: 5.2 %
HCT VFR BLD CALC: 28.3 %
HGB BLD-MCNC: 8.3 G/DL
IMM GRANULOCYTES NFR BLD AUTO: 0.6 %
LYMPHOCYTES # BLD AUTO: 1.76 K/UL
LYMPHOCYTES NFR BLD AUTO: 24.3 %
MAN DIFF?: NORMAL
MCHC RBC-ENTMCNC: 29.3 G/DL
MCHC RBC-ENTMCNC: 29.5 PG
MCV RBC AUTO: 100.7 FL
MONOCYTES # BLD AUTO: 1.05 K/UL
MONOCYTES NFR BLD AUTO: 14.5 %
NEUTROPHILS # BLD AUTO: 3.98 K/UL
NEUTROPHILS NFR BLD AUTO: 55 %
PLATELET # BLD AUTO: 194 K/UL
PMV BLD AUTO: 0 /100 WBCS
RBC # BLD: 2.81 M/UL
RBC # FLD: 19 %
WBC # FLD AUTO: 7.24 K/UL

## 2024-05-05 LAB
ALBUMIN SERPL ELPH-MCNC: 3.3 G/DL
ALP BLD-CCNC: 137 U/L
ALT SERPL-CCNC: 28 U/L
ANION GAP SERPL CALC-SCNC: 8 MMOL/L
AST SERPL-CCNC: 75 U/L
BILIRUB SERPL-MCNC: 0.3 MG/DL
BUN SERPL-MCNC: 19 MG/DL
CALCIUM SERPL-MCNC: 9 MG/DL
CHLORIDE SERPL-SCNC: 99 MMOL/L
CHOLEST SERPL-MCNC: 130 MG/DL
CO2 SERPL-SCNC: 32 MMOL/L
CREAT SERPL-MCNC: 2.2 MG/DL
EGFR: 21 ML/MIN/1.73M2
GLUCOSE SERPL-MCNC: 84 MG/DL
HDLC SERPL-MCNC: 48 MG/DL
LDLC SERPL CALC-MCNC: 69 MG/DL
NONHDLC SERPL-MCNC: 82 MG/DL
POTASSIUM SERPL-SCNC: 4.1 MMOL/L
PROT SERPL-MCNC: 6.4 G/DL
SODIUM SERPL-SCNC: 139 MMOL/L
TRIGL SERPL-MCNC: 66 MG/DL

## 2024-05-20 NOTE — PHYSICAL THERAPY INITIAL EVALUATION ADULT - PHYSICAL ASSIST/NONPHYSICAL ASSIST: GAIT, REHAB EVAL
[Normal] : abdomen normal bowel sounds, soft, non-tender, non distended and no hepatosplenomegaly [de-identified] : Neuro: grossly intact set-up required/verbal cues/1 person assist

## 2024-05-21 ENCOUNTER — APPOINTMENT (OUTPATIENT)
Dept: CARDIOLOGY | Facility: CLINIC | Age: 89
End: 2024-05-21

## 2024-05-24 NOTE — ED ADULT NURSE NOTE - CADM POA URETHRAL CATHETER
Spoke with patient, she no showed to her preop appointment.  She will come in today at 2:30 for consents.  Patient verbalized understanding.  Appointment added.  
No

## 2024-06-18 ENCOUNTER — APPOINTMENT (OUTPATIENT)
Dept: CARDIOLOGY | Facility: CLINIC | Age: 89
End: 2024-06-18

## 2024-07-20 ENCOUNTER — INPATIENT (INPATIENT)
Facility: HOSPITAL | Age: 89
LOS: 11 days | Discharge: NURSING, REHAB OR LONG-TERM CARE | DRG: 280 | End: 2024-08-01
Attending: INTERNAL MEDICINE | Admitting: STUDENT IN AN ORGANIZED HEALTH CARE EDUCATION/TRAINING PROGRAM
Payer: MEDICARE

## 2024-07-20 VITALS
OXYGEN SATURATION: 98 % | RESPIRATION RATE: 24 BRPM | WEIGHT: 125 LBS | TEMPERATURE: 98 F | HEART RATE: 98 BPM | DIASTOLIC BLOOD PRESSURE: 103 MMHG | SYSTOLIC BLOOD PRESSURE: 165 MMHG | HEIGHT: 65 IN

## 2024-07-20 DIAGNOSIS — Z90.710 ACQUIRED ABSENCE OF BOTH CERVIX AND UTERUS: Chronic | ICD-10-CM

## 2024-07-20 DIAGNOSIS — Z90.89 ACQUIRED ABSENCE OF OTHER ORGANS: Chronic | ICD-10-CM

## 2024-07-20 DIAGNOSIS — J18.9 PNEUMONIA, UNSPECIFIED ORGANISM: ICD-10-CM

## 2024-07-20 DIAGNOSIS — Z95.0 PRESENCE OF CARDIAC PACEMAKER: Chronic | ICD-10-CM

## 2024-07-20 DIAGNOSIS — Z90.49 ACQUIRED ABSENCE OF OTHER SPECIFIED PARTS OF DIGESTIVE TRACT: Chronic | ICD-10-CM

## 2024-07-20 DIAGNOSIS — Z98.890 OTHER SPECIFIED POSTPROCEDURAL STATES: Chronic | ICD-10-CM

## 2024-07-20 LAB
ALBUMIN SERPL ELPH-MCNC: 3.5 G/DL — SIGNIFICANT CHANGE UP (ref 3.5–5.2)
ALP SERPL-CCNC: 257 U/L — HIGH (ref 30–115)
ALT FLD-CCNC: 17 U/L — SIGNIFICANT CHANGE UP (ref 0–41)
ANION GAP SERPL CALC-SCNC: 13 MMOL/L — SIGNIFICANT CHANGE UP (ref 7–14)
AST SERPL-CCNC: 39 U/L — SIGNIFICANT CHANGE UP (ref 0–41)
BASOPHILS # BLD AUTO: 0.05 K/UL — SIGNIFICANT CHANGE UP (ref 0–0.2)
BASOPHILS NFR BLD AUTO: 0.4 % — SIGNIFICANT CHANGE UP (ref 0–1)
BILIRUB SERPL-MCNC: 0.4 MG/DL — SIGNIFICANT CHANGE UP (ref 0.2–1.2)
BUN SERPL-MCNC: 25 MG/DL — HIGH (ref 10–20)
CALCIUM SERPL-MCNC: 9.2 MG/DL — SIGNIFICANT CHANGE UP (ref 8.4–10.5)
CHLORIDE SERPL-SCNC: 96 MMOL/L — LOW (ref 98–110)
CO2 SERPL-SCNC: 28 MMOL/L — SIGNIFICANT CHANGE UP (ref 17–32)
CREAT SERPL-MCNC: 3.1 MG/DL — HIGH (ref 0.7–1.5)
EGFR: 14 ML/MIN/1.73M2 — LOW
EOSINOPHIL # BLD AUTO: 0.37 K/UL — SIGNIFICANT CHANGE UP (ref 0–0.7)
EOSINOPHIL NFR BLD AUTO: 3 % — SIGNIFICANT CHANGE UP (ref 0–8)
GLUCOSE SERPL-MCNC: 121 MG/DL — HIGH (ref 70–99)
HCT VFR BLD CALC: 38.8 % — SIGNIFICANT CHANGE UP (ref 37–47)
HGB BLD-MCNC: 12.3 G/DL — SIGNIFICANT CHANGE UP (ref 12–16)
IMM GRANULOCYTES NFR BLD AUTO: 0.3 % — SIGNIFICANT CHANGE UP (ref 0.1–0.3)
LYMPHOCYTES # BLD AUTO: 0.95 K/UL — LOW (ref 1.2–3.4)
LYMPHOCYTES # BLD AUTO: 7.7 % — LOW (ref 20.5–51.1)
MCHC RBC-ENTMCNC: 31.7 G/DL — LOW (ref 32–37)
MCHC RBC-ENTMCNC: 32.5 PG — HIGH (ref 27–31)
MCV RBC AUTO: 102.6 FL — HIGH (ref 81–99)
MONOCYTES # BLD AUTO: 0.68 K/UL — HIGH (ref 0.1–0.6)
MONOCYTES NFR BLD AUTO: 5.5 % — SIGNIFICANT CHANGE UP (ref 1.7–9.3)
NEUTROPHILS # BLD AUTO: 10.28 K/UL — HIGH (ref 1.4–6.5)
NEUTROPHILS NFR BLD AUTO: 83.1 % — HIGH (ref 42.2–75.2)
NRBC # BLD: 0 /100 WBCS — SIGNIFICANT CHANGE UP (ref 0–0)
NT-PROBNP SERPL-SCNC: HIGH PG/ML (ref 0–300)
PLATELET # BLD AUTO: 249 K/UL — SIGNIFICANT CHANGE UP (ref 130–400)
PMV BLD: 9.3 FL — SIGNIFICANT CHANGE UP (ref 7.4–10.4)
POTASSIUM SERPL-MCNC: 3.7 MMOL/L — SIGNIFICANT CHANGE UP (ref 3.5–5)
POTASSIUM SERPL-SCNC: 3.7 MMOL/L — SIGNIFICANT CHANGE UP (ref 3.5–5)
PROT SERPL-MCNC: 7.2 G/DL — SIGNIFICANT CHANGE UP (ref 6–8)
RBC # BLD: 3.78 M/UL — LOW (ref 4.2–5.4)
RBC # FLD: 15.7 % — HIGH (ref 11.5–14.5)
SODIUM SERPL-SCNC: 137 MMOL/L — SIGNIFICANT CHANGE UP (ref 135–146)
TROPONIN T, HIGH SENSITIVITY RESULT: 80 NG/L — CRITICAL HIGH (ref 6–13)
WBC # BLD: 12.37 K/UL — HIGH (ref 4.8–10.8)
WBC # FLD AUTO: 12.37 K/UL — HIGH (ref 4.8–10.8)

## 2024-07-20 PROCEDURE — 90935 HEMODIALYSIS ONE EVALUATION: CPT

## 2024-07-20 PROCEDURE — 71045 X-RAY EXAM CHEST 1 VIEW: CPT

## 2024-07-20 PROCEDURE — 73562 X-RAY EXAM OF KNEE 3: CPT | Mod: LT

## 2024-07-20 PROCEDURE — 97116 GAIT TRAINING THERAPY: CPT | Mod: GP

## 2024-07-20 PROCEDURE — 36415 COLL VENOUS BLD VENIPUNCTURE: CPT

## 2024-07-20 PROCEDURE — 80048 BASIC METABOLIC PNL TOTAL CA: CPT

## 2024-07-20 PROCEDURE — 94010 BREATHING CAPACITY TEST: CPT

## 2024-07-20 PROCEDURE — 93005 ELECTROCARDIOGRAM TRACING: CPT

## 2024-07-20 PROCEDURE — 83735 ASSAY OF MAGNESIUM: CPT

## 2024-07-20 PROCEDURE — 87640 STAPH A DNA AMP PROBE: CPT

## 2024-07-20 PROCEDURE — 84145 PROCALCITONIN (PCT): CPT

## 2024-07-20 PROCEDURE — 84484 ASSAY OF TROPONIN QUANT: CPT

## 2024-07-20 PROCEDURE — 84100 ASSAY OF PHOSPHORUS: CPT

## 2024-07-20 PROCEDURE — 85025 COMPLETE CBC W/AUTO DIFF WBC: CPT

## 2024-07-20 PROCEDURE — 87899 AGENT NOS ASSAY W/OPTIC: CPT

## 2024-07-20 PROCEDURE — 99291 CRITICAL CARE FIRST HOUR: CPT | Mod: GC

## 2024-07-20 PROCEDURE — 93283 PRGRMG EVAL IMPLANTABLE DFB: CPT

## 2024-07-20 PROCEDURE — 84550 ASSAY OF BLOOD/URIC ACID: CPT

## 2024-07-20 PROCEDURE — 97530 THERAPEUTIC ACTIVITIES: CPT | Mod: GP

## 2024-07-20 PROCEDURE — 87449 NOS EACH ORGANISM AG IA: CPT

## 2024-07-20 PROCEDURE — 97162 PT EVAL MOD COMPLEX 30 MIN: CPT | Mod: GP

## 2024-07-20 PROCEDURE — 86431 RHEUMATOID FACTOR QUANT: CPT

## 2024-07-20 PROCEDURE — 71045 X-RAY EXAM CHEST 1 VIEW: CPT | Mod: 26

## 2024-07-20 PROCEDURE — 93306 TTE W/DOPPLER COMPLETE: CPT

## 2024-07-20 PROCEDURE — 87040 BLOOD CULTURE FOR BACTERIA: CPT

## 2024-07-20 PROCEDURE — 99223 1ST HOSP IP/OBS HIGH 75: CPT

## 2024-07-20 PROCEDURE — 85027 COMPLETE CBC AUTOMATED: CPT

## 2024-07-20 PROCEDURE — 85652 RBC SED RATE AUTOMATED: CPT

## 2024-07-20 PROCEDURE — 86140 C-REACTIVE PROTEIN: CPT

## 2024-07-20 PROCEDURE — 73502 X-RAY EXAM HIP UNI 2-3 VIEWS: CPT | Mod: LT

## 2024-07-20 PROCEDURE — 80053 COMPREHEN METABOLIC PANEL: CPT

## 2024-07-20 PROCEDURE — 87641 MR-STAPH DNA AMP PROBE: CPT

## 2024-07-20 PROCEDURE — 0225U NFCT DS DNA&RNA 21 SARSCOV2: CPT

## 2024-07-20 PROCEDURE — 81001 URINALYSIS AUTO W/SCOPE: CPT

## 2024-07-20 PROCEDURE — 97110 THERAPEUTIC EXERCISES: CPT | Mod: GP

## 2024-07-20 PROCEDURE — 93010 ELECTROCARDIOGRAM REPORT: CPT

## 2024-07-20 RX ORDER — APIXABAN 5 MG/1
2.5 TABLET, FILM COATED ORAL
Refills: 0 | Status: DISCONTINUED | OUTPATIENT
Start: 2024-07-20 | End: 2024-08-01

## 2024-07-20 RX ORDER — ACETAMINOPHEN 500 MG
650 TABLET ORAL ONCE
Refills: 0 | Status: COMPLETED | OUTPATIENT
Start: 2024-07-20 | End: 2024-07-20

## 2024-07-20 RX ORDER — TIMOLOL MALEATE 2.5 MG/ML
1 SOLUTION/ DROPS OPHTHALMIC
Refills: 0 | Status: DISCONTINUED | OUTPATIENT
Start: 2024-07-20 | End: 2024-08-01

## 2024-07-20 RX ORDER — VANCOMYCIN HYDROCHLORIDE 5 G/100ML
1500 INJECTION, POWDER, LYOPHILIZED, FOR SOLUTION INTRAVENOUS ONCE
Refills: 0 | Status: DISCONTINUED | OUTPATIENT
Start: 2024-07-20 | End: 2024-07-22

## 2024-07-20 RX ORDER — ATORVASTATIN CALCIUM 40 MG/1
20 TABLET, FILM COATED ORAL AT BEDTIME
Refills: 0 | Status: DISCONTINUED | OUTPATIENT
Start: 2024-07-20 | End: 2024-08-01

## 2024-07-20 RX ORDER — MULTIVITAMIN/IRON/FOLIC ACID 18MG-0.4MG
1 TABLET ORAL DAILY
Refills: 0 | Status: DISCONTINUED | OUTPATIENT
Start: 2024-07-20 | End: 2024-08-01

## 2024-07-20 RX ORDER — PANTOPRAZOLE SODIUM 20 MG/1
40 TABLET, DELAYED RELEASE ORAL
Refills: 0 | Status: DISCONTINUED | OUTPATIENT
Start: 2024-07-20 | End: 2024-08-01

## 2024-07-20 RX ORDER — AZTREONAM 75 MG/ML
2000 KIT INHALATION ONCE
Refills: 0 | Status: COMPLETED | OUTPATIENT
Start: 2024-07-20 | End: 2024-07-20

## 2024-07-20 RX ORDER — SERTRALINE HYDROCHLORIDE 100 MG/1
25 TABLET, FILM COATED ORAL DAILY
Refills: 0 | Status: DISCONTINUED | OUTPATIENT
Start: 2024-07-20 | End: 2024-08-01

## 2024-07-20 RX ORDER — AMLODIPINE BESYLATE 2.5 MG/1
5 TABLET ORAL DAILY
Refills: 0 | Status: DISCONTINUED | OUTPATIENT
Start: 2024-07-20 | End: 2024-07-31

## 2024-07-20 RX ORDER — METOPROLOL TARTRATE 100 MG
50 TABLET ORAL DAILY
Refills: 0 | Status: DISCONTINUED | OUTPATIENT
Start: 2024-07-20 | End: 2024-07-23

## 2024-07-20 RX ORDER — VANCOMYCIN HYDROCHLORIDE 5 G/100ML
1400 INJECTION, POWDER, LYOPHILIZED, FOR SOLUTION INTRAVENOUS ONCE
Refills: 0 | Status: DISCONTINUED | OUTPATIENT
Start: 2024-07-20 | End: 2024-07-20

## 2024-07-20 RX ORDER — SACUBITRIL AND VALSARTAN 49; 51 MG/1; MG/1
1 TABLET, FILM COATED ORAL
Refills: 0 | Status: DISCONTINUED | OUTPATIENT
Start: 2024-07-20 | End: 2024-07-31

## 2024-07-20 RX ADMIN — Medication 200 GRAM(S): at 09:46

## 2024-07-20 RX ADMIN — Medication 650 MILLIGRAM(S): at 10:30

## 2024-07-20 RX ADMIN — AZTREONAM 100 MILLIGRAM(S): KIT at 10:41

## 2024-07-20 RX ADMIN — TIMOLOL MALEATE 1 DROP(S): 2.5 SOLUTION/ DROPS OPHTHALMIC at 21:54

## 2024-07-20 RX ADMIN — ATORVASTATIN CALCIUM 20 MILLIGRAM(S): 40 TABLET, FILM COATED ORAL at 21:54

## 2024-07-20 RX ADMIN — Medication 650 MILLIGRAM(S): at 10:01

## 2024-07-20 NOTE — H&P ADULT - NSHPPHYSICALEXAM_GEN_ALL_CORE
GEN: appears chronically ill, on BiPAP  RESP: decreased BS bilaterally, R rhonchi  CARD: regular S1, S2, peripheral pulses palpable  ABD: NTND  EXT: no FABRICIO  NEURO: AOx3

## 2024-07-20 NOTE — ED PROVIDER NOTE - PROGRESS NOTE DETAILS
Dr. Renata Guthrie, DO: Placed patient on BIPAP for increased WOB Dr. Renata Guthrie, DO: Nephro at bedside. Will take her to dialysis.

## 2024-07-20 NOTE — H&P ADULT - ASSESSMENT
Ms. Marrufo is an 88-year-old female (Catholic) PMH HTN, DVT/PE 2018, NICM - HFrEF improved (LVEF 40-45% in 4/23) s/p AICD, RA, ESRD on HD TTS who was brought in from [facility] for SOB, admitted for possible RLL PNA.    Acute Hypoxemic Hypercapnic Respiratory Failure 2/2 CAP vs. HFrEF Exacerbation  -patient on Eliquis, doubt PE  -was on BiPAP at time of encounter  -CXR RLL infiltrate     Plan:  ·	vancomycin 1400 mg IVPB x1 then dose with HD per ID pharm  ·	levofloxacin 500 mg IV q48h. If given on HD day, give after HD  ·	wean off BiPAP as tolerate  ·	incentive spirometry  ·	volume contraction with HD as tolerated  ·	f/u BCx, sputum Cx, procal, MRSA PCR, RVP, U strep & legionella  ·	repeat CXR in AM  ·	ID c/s    ESRD on HD TTS  -HD per nephro    HTN  HFrEF Improved  HO PE/DVT  -c/w Entresto 24 mg qd (unclear why not on bid dosing)  -c/w amlodipine 5 mg qd  -c/w atorvastatin 20 mg qhs  -c/w metoprolol ER 50 mg qd  -c/w Eliquis 2.5 mg bid    RA  -OP f/u    DVT prophylaxis: Eliquis  GI prophylaxis: PPI  Diet: renal  Code status: DNR/DNI   Activity: IAT PT    Med rec confirmed with [facility]               Ms. Marrufo is an 88-year-old female (Congregational) PMH HTN, DVT/PE 2018, NICM - HFrEF improved (LVEF 40-45% in 4/23) s/p AICD, RA, ESRD on HD TTS [facility], admitted for possible RLL PNA.    Acute Hypoxemic Hypercapnic Respiratory Failure 2/2 CAP vs. HFrEF Exacerbation  -patient on Eliquis, doubt PE  -was on BiPAP at time of encounter  -CXR RLL infiltrate     Plan:  ·	vancomycin 1500 mg IVPB x1 then dose with HD per ID pharm  ·	levofloxacin 500 mg IV q48h. If given on HD day, give after HD  ·	wean off BiPAP as tolerate  ·	incentive spirometry  ·	volume contraction with HD as tolerated  ·	f/u BCx, sputum Cx, procal, MRSA PCR, RVP, U strep & legionella  ·	f/u repeat trop  ·	repeat CXR in AM  ·	ID c/s    ESRD on HD TTS  -HD per nephro    HTN  HFrEF Improved  HO PE/DVT  -c/w Entresto 24 mg qd (unclear why not on bid dosing)  -c/w amlodipine 5 mg qd  -c/w atorvastatin 20 mg qhs  -c/w metoprolol ER 50 mg qd  -c/w Eliquis 2.5 mg bid    RA  -OP f/u    DVT prophylaxis: Eliquis  GI prophylaxis: PPI  Diet: renal  Code status: DNR/DNI   Activity: IAT PT    [facility] rec [facility] with [facility]

## 2024-07-20 NOTE — ED PROVIDER NOTE - CLINICAL SUMMARY MEDICAL DECISION MAKING FREE TEXT BOX
Patient presented with acute onsets of dyspnea from nursing home that began this morning.  Patient was complaining of cough and chest pain.  Last dialysis was 2 days ago.  On arrival, patient tachypneic and in acute respiratory distress requiring my immediate attention.  Seen on arrival at which point patient appeared to be grossly fluid overloaded and therefore was placed on BiPAP with significant improvement in respiratory status.  EKG was obtained and nonspecific, but no evidence of STEMI.  Obtained labs which showed known end-stage renal disease with elevated troponin around 80 and a proBNP of 38,000 but otherwise were grossly unremarkable including no significant leukocytosis, anemia, transaminitis or significant electrolyte abnormalities.  ABG showed hypercarbia and hypoxia, but pH appropriately compensated.  Chest x-ray obtained and showed bilateral opacities suggestive of fluid overload.  Patient remained stable during ED course on BiPAP.  Nephrology and ICU consulted.  Nephrology to take patient to dialysis and ICU at this time recommending admission to the floor for further monitoring and management.  Hemodynamically stable at time of admission.

## 2024-07-20 NOTE — PATIENT PROFILE ADULT - FALL HARM RISK - HARM RISK INTERVENTIONS

## 2024-07-20 NOTE — ED PROVIDER NOTE - CARE PLAN
Principal Discharge DX:	RLL pneumonia  Secondary Diagnosis:	Elevated troponin  Secondary Diagnosis:	SOB (shortness of breath)  Secondary Diagnosis:	ESRD on dialysis   1

## 2024-07-20 NOTE — ED PROVIDER NOTE - OBJECTIVE STATEMENT
88-year-old female, with past medical history of HTN, DVT, PE on Eliquis, HFrEF (echo in April 2024 with EF of 45%), AICD, ESRD on hemodialysis T/TH/SA, RA, presents to the emergency department from [facility] as a hospice patient for acute onset shortness of breath this morning.  Associated with cough and chest pain.  Patient's last dialysis session was 2 days ago and she was due for dialysis today.  Sees Dr. Urrutia, nephrologist.

## 2024-07-20 NOTE — ED PROVIDER NOTE - CONSIDERATION OF ADMISSION OBSERVATION
Patient with (+) acute hypoxic respiratory failure 2/2 fluid overload with possible concurrent PNA. Will require admission. Consideration of Admission/Observation

## 2024-07-20 NOTE — ED ADULT NURSE NOTE - NSFALLHARMRISKINTERV_ED_ALL_ED

## 2024-07-20 NOTE — ED PROVIDER NOTE - PHYSICAL EXAMINATION
GENERAL: Chronically ill appearing; in no acute distress.   SKIN: warm, dry  HEAD: Normocephalic; atraumatic.  EYES: PERRLA, EOMI, no conjunctival erythema  ENT: No nasal discharge; airway clear.  NECK: Supple; non tender.  CARD: Regular rate and rhythm. S1, S2 normal; no murmurs, gallops, or rubs. JVD. 2/4 radial pulses   RESP: Increased WOB. R lung diffuse rales; decreased BS on L throughout; No wheezes, rhonchi, or stridor.  ABD: soft, nontender, nondistended  EXT: Normal ROM.  No LE TTP or edema bilaterally.  NEURO: A/ox3, grossly unremarkable

## 2024-07-20 NOTE — H&P ADULT - ATTENDING COMMENTS
Assessment    Acute hypoxemic and hypercapnic respiratory failure likely secondary to HFrEF exacerbation, likely cardiorenal, doubt pneumonia  Troponinemia likely elevated due to demand and ESRD  End-stage renal on HD  Hypertension  History of PE and DVT  Rheumatoid arthritis    Plan    – needed bipap earlier on now doing ok on nasal cannula, states she had dialysis earlier today, states she's generally feeling better  - trend troponin was 80 earlier today, patient states she had some generalized pain earlier today including chest pain, more likely pain from RA, will repeat echo, will have AICD interrogated  - patient did not miss any HD sessions, cannot rule out cardiac event  - leukocytosis possibly from pneumonia, more likely inflammatory, will have ID see patient c/w abx for now, had LBBB in April as well, now with PACs  - considering cardio after if echo show's worsening or worsening trops, diuresis at the moment primarly through dialysis  - c/w amlodipine / toprol / entresto  - c/w home eliquis    Pending: trops, echo, ID f/u    # DVT PPX: eliquis

## 2024-07-20 NOTE — H&P ADULT - HISTORY OF PRESENT ILLNESS
Chief Complaint: SOB    History of Present Illness & Past Medical History:  Ms. Marrufo is an 88-year-old female (Roman Catholic) PMH HTN, DVT/PE 2018, NICM - HFrEF improved (LVEF 40-45% in 4/23) s/p AICD, RA, ESRD on HD TTS who was brought in from [facility] for SOB.    The patient had been having non-productive cough and pleuritic chest pain since the morning of admission. She was being treated for UTI at the NH with Bactrim. Denied fevers, chills, LOC, N/V/D.    On presentation in the ED she was on 10L NRB satting 98% and was then placed on BiPAP for increased WOB.    Vitals in the ED:  /103  HR 98  SpO2 98% on 10L NRB  RR 24  Temp 98 F    Physical Exam:  GEN: appears chronically ill, on BiPAP  RESP: decreased BS bilaterally, R rhonchi  CARD: regular S1, S2, peripheral pulses palpable  ABD: NTND  EXT: no FABRICIO  NEURO: AOx3  PSYCH: cooperative    Labs:  WBC 12K  BUN/Cr 25/3.1    Trop 80  VBG: pCO2 55 pH 7.36 lactate 1.5    Imaging:  CXR: RLL infiltrate, pulmonary vascular congestion on my read  ECG showed LBBB unchanged from prior.    The patient received aztreonam and levofloxacin and was admitted to medicine.

## 2024-07-21 LAB
PROCALCITONIN SERPL-MCNC: 0.23 NG/ML — HIGH (ref 0.02–0.1)
TROPONIN T, HIGH SENSITIVITY RESULT: 123 NG/L — CRITICAL HIGH (ref 6–13)
TROPONIN T, HIGH SENSITIVITY RESULT: 133 NG/L — CRITICAL HIGH (ref 6–13)

## 2024-07-21 PROCEDURE — 71045 X-RAY EXAM CHEST 1 VIEW: CPT | Mod: 26

## 2024-07-21 PROCEDURE — 99233 SBSQ HOSP IP/OBS HIGH 50: CPT

## 2024-07-21 PROCEDURE — 99223 1ST HOSP IP/OBS HIGH 75: CPT

## 2024-07-21 PROCEDURE — 93283 PRGRMG EVAL IMPLANTABLE DFB: CPT | Mod: 26

## 2024-07-21 PROCEDURE — 93010 ELECTROCARDIOGRAM REPORT: CPT

## 2024-07-21 RX ORDER — ACETAMINOPHEN 500 MG
325 TABLET ORAL ONCE
Refills: 0 | Status: DISCONTINUED | OUTPATIENT
Start: 2024-07-21 | End: 2024-07-21

## 2024-07-21 RX ADMIN — Medication 50 MILLIGRAM(S): at 05:41

## 2024-07-21 RX ADMIN — TIMOLOL MALEATE 1 DROP(S): 2.5 SOLUTION/ DROPS OPHTHALMIC at 17:33

## 2024-07-21 RX ADMIN — TIMOLOL MALEATE 1 DROP(S): 2.5 SOLUTION/ DROPS OPHTHALMIC at 05:41

## 2024-07-21 RX ADMIN — APIXABAN 2.5 MILLIGRAM(S): 5 TABLET, FILM COATED ORAL at 05:41

## 2024-07-21 RX ADMIN — AMLODIPINE BESYLATE 5 MILLIGRAM(S): 2.5 TABLET ORAL at 05:41

## 2024-07-21 RX ADMIN — PANTOPRAZOLE SODIUM 40 MILLIGRAM(S): 20 TABLET, DELAYED RELEASE ORAL at 06:28

## 2024-07-21 RX ADMIN — SACUBITRIL AND VALSARTAN 1 TABLET(S): 49; 51 TABLET, FILM COATED ORAL at 06:28

## 2024-07-21 RX ADMIN — SERTRALINE HYDROCHLORIDE 25 MILLIGRAM(S): 100 TABLET, FILM COATED ORAL at 11:56

## 2024-07-21 RX ADMIN — APIXABAN 2.5 MILLIGRAM(S): 5 TABLET, FILM COATED ORAL at 17:32

## 2024-07-21 RX ADMIN — Medication 1 MILLIGRAM(S): at 11:55

## 2024-07-21 RX ADMIN — ATORVASTATIN CALCIUM 20 MILLIGRAM(S): 40 TABLET, FILM COATED ORAL at 21:17

## 2024-07-21 NOTE — CONSULT NOTE ADULT - NS ATTEND AMEND GEN_ALL_CORE FT
Agree with assessment and plan above, unless otherwise documented in my attestation.    Ms Marrufo is a 88yoF with PMHx of chronic HFmrEF, ESRD on HD, HTN who presented with dyspnea and cough. Cardiology consulted for troponin elevation. Suspect that troponin elevation is related to type II demand ischemia due to pneumonia vs mild ADHF. Very low suspicion for ACS (plaque rupture)  -Diurese with lasix and volume removal via HD  -Recommend treatment for possible pneumonia  -Continue home statin

## 2024-07-21 NOTE — PROGRESS NOTE ADULT - SUBJECTIVE AND OBJECTIVE BOX
RACHEL SUMMERS  88y  Female      Patient is a 88y old  Female who presents with a chief complaint of Pneumonia (21 Jul 2024 08:39)      INTERVAL HPI/OVERNIGHT EVENTS:  She feels better today still with cough, no fever.   Vital Signs Last 24 Hrs  T(C): 36.6 (21 Jul 2024 12:28), Max: 36.8 (21 Jul 2024 04:05)  T(F): 97.9 (21 Jul 2024 12:28), Max: 98.3 (21 Jul 2024 04:05)  HR: 66 (21 Jul 2024 12:28) (63 - 89)  BP: 118/64 (21 Jul 2024 12:28) (118/64 - 167/87)  BP(mean): 88 (21 Jul 2024 04:05) (88 - 88)  RR: 18 (21 Jul 2024 12:28) (17 - 18)  SpO2: 100% (21 Jul 2024 12:28) (98% - 100%)    Parameters below as of 21 Jul 2024 04:05  Patient On (Oxygen Delivery Method): room air          07-20-24 @ 07:01  -  07-21-24 @ 07:00  --------------------------------------------------------  IN: 0 mL / OUT: 2000 mL / NET: -2000 mL            Consultant(s) Notes Reviewed:  [x ] YES  [ ] NO          MEDICATIONS  (STANDING):  amLODIPine   Tablet 5 milliGRAM(s) Oral daily  apixaban 2.5 milliGRAM(s) Oral two times a day  atorvastatin 20 milliGRAM(s) Oral at bedtime  folic acid 1 milliGRAM(s) Oral daily  metoprolol succinate ER 50 milliGRAM(s) Oral daily  pantoprazole    Tablet 40 milliGRAM(s) Oral before breakfast  sacubitril 24 mG/valsartan 26 mG 1 Tablet(s) Oral <User Schedule>  sertraline 25 milliGRAM(s) Oral daily  timolol 0.5% Solution 1 Drop(s) Both EYES two times a day  vancomycin  IVPB 1500 milliGRAM(s) IV Intermittent once    MEDICATIONS  (PRN):      LABS                          12.3   12.37 )-----------( 249      ( 20 Jul 2024 09:40 )             38.8     07-20    137  |  96<L>  |  25<H>  ----------------------------<  121<H>  3.7   |  28  |  3.1<H>    Ca    9.2      20 Jul 2024 09:40    TPro  7.2  /  Alb  3.5  /  TBili  0.4  /  DBili  x   /  AST  39  /  ALT  17  /  AlkPhos  257<H>  07-20      Urinalysis Basic - ( 20 Jul 2024 09:40 )    Color: x / Appearance: x / SG: x / pH: x  Gluc: 121 mg/dL / Ketone: x  / Bili: x / Urobili: x   Blood: x / Protein: x / Nitrite: x   Leuk Esterase: x / RBC: x / WBC x   Sq Epi: x / Non Sq Epi: x / Bacteria: x        Lactate Trend        CAPILLARY BLOOD GLUCOSE            RADIOLOGY & ADDITIONAL TESTS:    Imaging Personally Reviewed:  [ ] YES  [ ] NO    HEALTH ISSUES - PROBLEM Dx:          PHYSICAL EXAM:  GENERAL: NAD, well-developed.  HEAD:  Atraumatic, Normocephalic.  EYES: EOMI, PERRLA, conjunctiva and sclera clear.  NECK: Supple, increased JVP  CHEST/LUNG: bilateral lower lung rales worse on the left.   HEART: Regular rate and rhythm; S1 S2.   ABDOMEN: Soft, Nontender, Nondistended; Bowel sounds present.  EXTREMITIES:  2+ Peripheral Pulses, No clubbing, cyanosis, or edema.  PSYCH: AAOx3.  NEUROLOGY: non-focal.  SKIN: No rashes or lesions.

## 2024-07-21 NOTE — CONSULT NOTE ADULT - SUBJECTIVE AND OBJECTIVE BOX
NEPHROLOGY CONSULTATION NOTE    THIS CONSULT IS INCOMPLETE / FULL CONSULT TO FOLLOW    Patient is a 88y Female whom presented to the hospital with     PAST MEDICAL & SURGICAL HISTORY:  Hypertension      Gout      Diverticulitis  sigmoid      Rheumatoid arthritis      DVT, lower extremity  Bilateral      Pulmonary embolism      Chronic HFrEF (heart failure with reduced ejection fraction)      AICD (automatic cardioverter/defibrillator) present      ESRD on dialysis      Artificial pacemaker      History of appendectomy      History of tonsillectomy      History of hysterectomy      H/O hernia repair        Allergies:  cephalosporins (Angioedema)  Keflex (Swelling)    Home Medications Reviewed  Hospital Medications:   MEDICATIONS  (STANDING):  amLODIPine   Tablet 5 milliGRAM(s) Oral daily  apixaban 2.5 milliGRAM(s) Oral two times a day  atorvastatin 20 milliGRAM(s) Oral at bedtime  folic acid 1 milliGRAM(s) Oral daily  metoprolol succinate ER 50 milliGRAM(s) Oral daily  pantoprazole    Tablet 40 milliGRAM(s) Oral before breakfast  sacubitril 24 mG/valsartan 26 mG 1 Tablet(s) Oral <User Schedule>  sertraline 25 milliGRAM(s) Oral daily  timolol 0.5% Solution 1 Drop(s) Both EYES two times a day  vancomycin  IVPB 1500 milliGRAM(s) IV Intermittent once      SOCIAL HISTORY:  Denies ETOH,Smoking,   FAMILY HISTORY:  FH: arthritis  sister          REVIEW OF SYSTEMS:  CONSTITUTIONAL: No weakness, fevers or chills  EYES/ENT: No visual changes;  No vertigo or throat pain   NECK: No pain or stiffness  RESPIRATORY: No cough, wheezing, hemoptysis; No shortness of breath  CARDIOVASCULAR: No chest pain or palpitations.  GASTROINTESTINAL: No abdominal or epigastric pain. No nausea, vomiting, or hematemesis; No diarrhea or constipation. No melena or hematochezia.  GENITOURINARY: No dysuria, frequency, foamy urine, urinary urgency, incontinence or hematuria  NEUROLOGICAL: No numbness or weakness  SKIN: No itching, burning, rashes, or lesions   VASCULAR: No bilateral lower extremity edema.   All other review of systems is negative unless indicated above.    VITALS:  T(F): 98.3 (07-21-24 @ 04:05), Max: 100.8 (07-20-24 @ 10:00)  HR: 80 (07-21-24 @ 04:05)  BP: 135/65 (07-21-24 @ 04:05)  RR: 18 (07-21-24 @ 04:05)  SpO2: 99% (07-21-24 @ 04:05)    07-20 @ 07:01  -  07-21 @ 07:00  --------------------------------------------------------  IN: 0 mL / OUT: 2000 mL / NET: -2000 mL      Height (cm): 165.1 (07-20 @ 08:59)  Weight (kg): 56.7 (07-20 @ 08:59)  BMI (kg/m2): 20.8 (07-20 @ 08:59)  BSA (m2): 1.62 (07-20 @ 08:59)      I&O's Detail    20 Jul 2024 07:01  -  21 Jul 2024 07:00  --------------------------------------------------------  IN:  Total IN: 0 mL    OUT:    Other (mL): 2000 mL  Total OUT: 2000 mL    Total NET: -2000 mL            PHYSICAL EXAM:  Constitutional: NAD  HEENT: anicteric sclera, oropharynx clear, MMM  Neck: No JVD  Respiratory: CTAB, no wheezes, rales or rhonchi  Cardiovascular: S1, S2, RRR  Gastrointestinal: BS+, soft, NT/ND  Extremities: No cyanosis or clubbing. No peripheral edema  Neurological: A/O x 3, no focal deficits  Psychiatric: Normal mood, normal affect  : No CVA tenderness. No carlos.   Skin: No rashes  Vascular Access:    LABS:  07-20    137  |  96<L>  |  25<H>  ----------------------------<  121<H>  3.7   |  28  |  3.1<H>    Ca    9.2      20 Jul 2024 09:40    TPro  7.2  /  Alb  3.5  /  TBili  0.4  /  DBili      /  AST  39  /  ALT  17  /  AlkPhos  257<H>  07-20    Creatinine Trend: 3.1 <--                        12.3   12.37 )-----------( 249      ( 20 Jul 2024 09:40 )             38.8     Urine Studies:  Urinalysis Basic - ( 20 Jul 2024 09:40 )    Color:  / Appearance:  / SG:  / pH:   Gluc: 121 mg/dL / Ketone:   / Bili:  / Urobili:    Blood:  / Protein:  / Nitrite:    Leuk Esterase:  / RBC:  / WBC    Sq Epi:  / Non Sq Epi:  / Bacteria:             Iron 33, TIBC 147, %sat 22      [04-04-24 @ 06:44]  Ferritin 785      [04-04-24 @ 06:44]  PTH -- (Ca 8.2)      [04-09-24 @ 05:43]   35  PTH -- (Ca 8.2)      [03-24-24 @ 20:00]   138  Vitamin D (25OH) 11      [04-09-24 @ 05:43]  Lipid: chol 144, , HDL 39, LDL --      [03-22-24 @ 06:14]    HBsAb Nonreact      [03-27-24 @ 15:55]  HBsAg Nonreact      [04-01-24 @ 15:52]  HBcAb Nonreact      [04-03-24 @ 16:02]  HCV 0.20, Nonreact      [04-01-24 @ 15:52]    OSIEL: titer 1:160, pattern DFS70      [04-27-22 @ 16:00]  Rheumatoid Factor 31      [07-18-22 @ 05:21]  Free Light Chains: kappa 13.15, lambda 10.99, ratio = 1.20      [12-21 @ 07:39]  Immunofixation Serum:   No Monoclonal Band Identified    Reference Range: None Detected      [12-20-22 @ 10:57]  SPEP Interpretation: Normal Electrophoresis Pattern      [12-20-22 @ 10:57]  Immunofixation Urine: Reference Range: None Detected      [12-25-20 @ 12:35]  UPEP Interpretation: Mild Selective (Glomerular) Proteinuria      [12-25-20 @ 12:35]      RADIOLOGY & ADDITIONAL STUDIES:                 NEPHROLOGY CONSULTATION NOTE    88-year-old female (Nondenominational) PMH HTN, DVT/PE 2018, NICM - HFrEF improved (LVEF 40-45% in 4/23) s/p AICD, RA, ESRD on HD TTS who was brought in from [facility] for SOB.    The patient had been having non-productive cough and pleuritic chest pain since the morning of admission. She was being treated for UTI at the NH with Bactrim. Denied fevers, chills, LOC, N/V/D.  sp HD yesterday     PAST MEDICAL & SURGICAL HISTORY:  Hypertension  Gout  Diverticulitis sigmoid  Rheumatoid arthritis  DVT, lower extremity Bilateral  Pulmonary embolism  Chronic HFrEF (heart failure with reduced ejection fraction)  AICD (automatic cardioverter/defibrillator) present  ESRD on dialysis  Artificial pacemaker  History of appendectomy  History of tonsillectomy  History of hysterectomy  H/O hernia repair        Allergies:  cephalosporins (Angioedema)  Keflex (Swelling)    Home Medications Reviewed  Hospital Medications:   MEDICATIONS  (STANDING):  amLODIPine   Tablet 5 milliGRAM(s) Oral daily  apixaban 2.5 milliGRAM(s) Oral two times a day  atorvastatin 20 milliGRAM(s) Oral at bedtime  folic acid 1 milliGRAM(s) Oral daily  metoprolol succinate ER 50 milliGRAM(s) Oral daily  pantoprazole    Tablet 40 milliGRAM(s) Oral before breakfast  sacubitril 24 mG/valsartan 26 mG 1 Tablet(s) Oral <User Schedule>  sertraline 25 milliGRAM(s) Oral daily  timolol 0.5% Solution 1 Drop(s) Both EYES two times a day  vancomycin  IVPB 1500 milliGRAM(s) IV Intermittent once      SOCIAL HISTORY:  Denies ETOH,Smoking,   FAMILY HISTORY:  FH: arthritis  sister          REVIEW OF SYSTEMS:  All other review of systems is negative unless indicated above.    VITALS:  T(F): 98.3 (07-21-24 @ 04:05), Max: 100.8 (07-20-24 @ 10:00)  HR: 80 (07-21-24 @ 04:05)  BP: 135/65 (07-21-24 @ 04:05)  RR: 18 (07-21-24 @ 04:05)  SpO2: 99% (07-21-24 @ 04:05)    07-20 @ 07:01  -  07-21 @ 07:00  --------------------------------------------------------  IN: 0 mL / OUT: 2000 mL / NET: -2000 mL      Height (cm): 165.1 (07-20 @ 08:59)  Weight (kg): 56.7 (07-20 @ 08:59)  BMI (kg/m2): 20.8 (07-20 @ 08:59)  BSA (m2): 1.62 (07-20 @ 08:59)      I&O's Detail    20 Jul 2024 07:01  -  21 Jul 2024 07:00  --------------------------------------------------------  IN:  Total IN: 0 mL    OUT:    Other (mL): 2000 mL  Total OUT: 2000 mL    Total NET: -2000 mL            PHYSICAL EXAM:  Constitutional: NAD  Respiratory: CTAB,   Cardiovascular: S1, S2, RRR  Gastrointestinal: BS+, soft, NT/ND  Extremities: No cyanosis or clubbing. No peripheral edema  : No CVA tenderness. No carlos.   Skin: No rashes  Vascular Access:    LABS:  07-20    137  |  96<L>  |  25<H>  ----------------------------<  121<H>  3.7   |  28  |  3.1<H>    Ca    9.2      20 Jul 2024 09:40    TPro  7.2  /  Alb  3.5  /  TBili  0.4  /  DBili      /  AST  39  /  ALT  17  /  AlkPhos  257<H>  07-20    Creatinine Trend: 3.1 <--                        12.3   12.37 )-----------( 249      ( 20 Jul 2024 09:40 )             38.8     Urine Studies:  Urinalysis Basic - ( 20 Jul 2024 09:40 )    Color:  / Appearance:  / SG:  / pH:   Gluc: 121 mg/dL / Ketone:   / Bili:  / Urobili:    Blood:  / Protein:  / Nitrite:    Leuk Esterase:  / RBC:  / WBC    Sq Epi:  / Non Sq Epi:  / Bacteria:             Iron 33, TIBC 147, %sat 22      [04-04-24 @ 06:44]  Ferritin 785      [04-04-24 @ 06:44]  PTH -- (Ca 8.2)      [04-09-24 @ 05:43]   35  PTH -- (Ca 8.2)      [03-24-24 @ 20:00]   138  Vitamin D (25OH) 11      [04-09-24 @ 05:43]  Lipid: chol 144, , HDL 39, LDL --      [03-22-24 @ 06:14]    HBsAb Nonreact      [03-27-24 @ 15:55]  HBsAg Nonreact      [04-01-24 @ 15:52]  HBcAb Nonreact      [04-03-24 @ 16:02]  HCV 0.20, Nonreact      [04-01-24 @ 15:52]    OSIEL: titer 1:160, pattern DFS70      [04-27-22 @ 16:00]  Rheumatoid Factor 31      [07-18-22 @ 05:21]  Free Light Chains: kappa 13.15, lambda 10.99, ratio = 1.20      [12-21 @ 07:39]  Immunofixation Serum:   No Monoclonal Band Identified    Reference Range: None Detected      [12-20-22 @ 10:57]  SPEP Interpretation: Normal Electrophoresis Pattern      [12-20-22 @ 10:57]  Immunofixation Urine: Reference Range: None Detected      [12-25-20 @ 12:35]  UPEP Interpretation: Mild Selective (Glomerular) Proteinuria      [12-25-20 @ 12:35]      RADIOLOGY & ADDITIONAL STUDIES:

## 2024-07-21 NOTE — CONSULT NOTE ADULT - ASSESSMENT
88-year-old female (Latter day) PMH HTN, DVT/PE 2018, NICM - HFrEF improved (LVEF 40-45% in 4/23) s/p AICD, RA, ESRD on HD TTS who was brought in from [facility] for SOB.    # ESRD on HD T TH Sat sp HD yesterday   # SOB volume overload? CHF     - will assess need for H Din AM   - repeat BMP /CBC / check IP and PTH  - appreciate cardio notes   will follow

## 2024-07-21 NOTE — CONSULT NOTE ADULT - ASSESSMENT
IMPRESSION: Ms. Marrufo is an 88-year-old female (Sikh) PMH HTN, DVT/PE 2018, NICM ( normal LHC 2014)  - HFrEF improved (LVEF 40-45% in 4/23) s/p AICD, RA, ESRD on HD TTS who was brought in from [facility] for SOB.  now admitted for HF exacerbation with PNA as triggering factor and concern for type 2  NSTEMI     PLAN   -  repeat hS tROP       IMPRESSION: Ms. Marrufo is an 88-year-old female (Spiritism) PMH HTN, DVT/PE 2018, NICM ( normal LHC 2014)  - HFrEF improved (LVEF 40-45% in 4/23) s/p AICD, RA, ESRD on HD TTS who was brought in from [facility] for SOB.  now admitted for HF exacerbation (wet and warm) with PNA as triggering factor and concern for likely  type 2  NSTEMI in light of sepsis and HF.     PLAN   - Trend trop till plateau. 80>>123>>133 with no recent EKG changes - ST depressions nor elevations    - underlying LBBB present on prior EKGS   - resume diuresis with furosemide 40 mg IV BID  - ensure strict ins/out   - continue HF GDMT as tolerated   - adequate electrolyte supplementation as needed

## 2024-07-21 NOTE — PROGRESS NOTE ADULT - ASSESSMENT
88-year-old female (Voodoo) PMH HTN, DVT/PE 2018, NICM - HFrEF improved (LVEF 40-45% in 4/23) s/p AICD, RA, ESRD on HD TTS who was brought in from [facility] for SOB, admitted for possible RLL PNA.    Acute Hypoxemic Hypercapnic Respiratory Failure:   Acute HFrEF vs Pneumonia:   Non ischemic Cardiomyopathy: s/p AICD  Patient with SOB, cough, on exam noted with left lower lung rales.   CXR showed bilateral lower lung opacities and effusion.   Started on Lasix 40mg IV BID (still making a little urine).   Continue Entresto and Metoprolol.   Patient with cough, leukocytosis, could be pneumonia as well, started on Levaquin.   s/p BiPAP  incentive spirometry  f/u BCx, sputum Cx, procal, MRSA PCR, RVP, U strep & legionella  Cardiology consult.     ESRD on HD TTS  Continue HD.     HTN  Continue Entresto, Amlodipine and Metoprolol.     history PE/DVT      RA  -OP f/u    DVT prophylaxis: Eliquis  GI prophylaxis: PPI    Code status: DNR/DNI   #Progress Note Handoff:  Pending (specify):  cardiology follow up, improving SOB  Family discussion: with her son at bedside.   Disposition: from SNF

## 2024-07-21 NOTE — CHART NOTE - NSCHARTNOTEFT_GEN_A_CORE
[employer] CRTD interrogated  Battery 7.8 years  Follows with Dr. Baez  Optivol accumulation since June 27, 2024  RV threshold 2.0 at 1.0 MS  NSVT Hui 18, 2024 x 1 second   .03%

## 2024-07-21 NOTE — CONSULT NOTE ADULT - SUBJECTIVE AND OBJECTIVE BOX
Outpt cardiologist:    HPI:  Chief Complaint: SOB    History of Present Illness & Past Medical History:  Ms. Marrufo is an 88-year-old female (Muslim) PMH HTN, DVT/PE , NICM ( normal [facility] )  - HFrEF improved (LVEF 40-45% in ) s/p AICD, RA, ESRD on HD TTS who was brought in from [facility] for SOB.    The patient had been having non-productive cough and pleuritic chest pain since the morning of admission. She was being treated for UTI at the NH with Bactrim. Denied fevers, chills, LOC, N/V/D.    On presentation in the ED she was on 10L NRB satting 98% and was then placed on BiPAP for increased WOB.    Vitals in the ED:  /103  HR 98  SpO2 98% on 10L NRB  RR 24  Temp 98 F    Physical Exam:  GEN: appears chronically ill, on BiPAP  RESP: decreased BS bilaterally, R rhonchi  CARD: regular S1, S2, peripheral pulses palpable  ABD: NTND  EXT: 2+ pitting lower limb edema   NEURO: AOx3  PSYCH: cooperative    Labs:  WBC 12K  BUN/Cr 25/3.1    Trop 80  VBG: pCO2 55 pH 7.36 lactate 1.5    Imaging:  CXR: RLL infiltrate, pulmonary vascular congestion on my read  ECG showed LBBB unchanged from prior.    The patient received aztreonam and levofloxacin and was admitted to medicine. (2024 13:51)      Cardiac Fellow Additional notes:    - of note patient reports she still makes urine and was previously on furosemide 20 mg daily.  fu with outside Cardiologist Dr Hollins.     PAST MEDICAL & SURGICAL HISTORY  Hypertension    Gout    Diverticulitis  sigmoid    Rheumatoid arthritis    DVT, lower extremity  Bilateral    Pulmonary embolism    Chronic HFrEF (heart failure with reduced ejection fraction)    AICD (automatic cardioverter/defibrillator) present    ESRD on dialysis    Artificial pacemaker    History of appendectomy    History of tonsillectomy    History of hysterectomy    H/O hernia repair        FAMILY HISTORY:  FAMILY HISTORY:  FH: arthritis  sister        SOCIAL HISTORY:  Social History: -      ALLERGIES:  cephalosporins (Angioedema)  Keflex (Swelling)      MEDICATIONS:  amLODIPine   Tablet 5 milliGRAM(s) Oral daily  apixaban 2.5 milliGRAM(s) Oral two times a day  atorvastatin 20 milliGRAM(s) Oral at bedtime  folic acid 1 milliGRAM(s) Oral daily  metoprolol succinate ER 50 milliGRAM(s) Oral daily  pantoprazole    Tablet 40 milliGRAM(s) Oral before breakfast  sacubitril 24 mG/valsartan 26 mG 1 Tablet(s) Oral <User Schedule>  sertraline 25 milliGRAM(s) Oral daily  timolol 0.5% Solution 1 Drop(s) Both EYES two times a day  vancomycin  IVPB 1500 milliGRAM(s) IV Intermittent once    PRN:      HOME MEDICATIONS:  Home Medications:  apixaban 2.5 mg oral tablet: 1 tab(s) orally 2 times a day (2024 13:45)  Entresto 24 mg-26 mg oral tablet: 1 tab(s) orally once a day (2024 13:44)  folic acid 1 mg oral tablet: 1 tab(s) orally once a day (2024 13:45)  Lipitor 20 mg oral tablet: 1 tab(s) orally once a day (at bedtime) (2024 13:45)  metoprolol succinate 50 mg oral capsule, extended release: 1 cap(s) orally once a day (2024 13:45)  Norvasc 5 mg oral tablet: 1 tab(s) orally once a day (2024 13:44)  sertraline 25 mg oral tablet: 1 tab(s) orally once a day (2024 13:45)  Timolol Maleate, Ophthalmic 0.5% preservative-free ophthalmic solution: 1 drop(s) to both eyes 2 times a day (2024 13:45)      VITALS:   T(F): 97.8 ( @ 19:08), Max: 100.8 ( @ 10:00)  HR: 89 ( @ 19:08) (63 - 98)  BP: 167/87 ( @ 19:08) (144/81 - 178/100)  BP(mean): 112 ( @ 12:03) (112 - 132)  RR: 18 ( @ 19:08) (17 - 24)  SpO2: 98% ( @ 19:08) (96% - 99%)    I&O's Summary    2024 07:01  -  2024 03:27  --------------------------------------------------------  IN: 0 mL / OUT: 2000 mL / NET: -2000 mL        REVIEW OF SYSTEMS:  CONSTITUTIONAL: No weakness, fevers or chills  HEENT: No visual changes, neck/ear pain  RESPIRATORY: No cough, sob  CARDIOVASCULAR: See HPI  GASTROINTESTINAL: No abdominal pain. No nausea, vomiting, diarrhea   GENITOURINARY: No dysuria, frequency or hematuria  NEUROLOGICAL: No new focal deficits  SKIN: No new rashes    PHYSICAL EXAM:  General: Not in distress.  Non-toxic appearing.   HEENT: EOMI  Cardio: regular, S1, S2, no murmur  Pulm: B/L BS.  No wheezing / crackles / rales  Abdomen: Soft, non-tender, non-distended. Normoactive bowel sounds  Extremities: No edema b/l le  Neuro: A&O x3. No focal deficits    LABS:                        12.3   12.37 )-----------( 249      ( 2024 09:40 )             38.8     07    137  |  96<L>  |  25<H>  ----------------------------<  121<H>  3.7   |  28  |  3.1<H>    Ca    9.2      2024 09:40    TPro  7.2  /  Alb  3.5  /  TBili  0.4  /  DBili  x   /  AST  39  /  ALT  17  /  AlkPhos  257<H>  -          Troponin trend: 80 toA23         SARS-CoV-2: NotDetec (2024 00:15)  SARS-CoV-2: NotDetec (27 Mar 2024 12:02)      RADIOLOGY:    -CXR: vacular congestion with mild left pleural effusion     -TTE:  24   1. Mildly decreased global left ventricular systolic function with a   biplane EF of 45%. Mild (Grade I) diastolic dysfunction. Severe   concentric left ventricular hypertrophy.   2. Normal right ventricular size and function.   3. Severely enlarged left atrium.   4. Degenerative mitral valve with thickening and calcification of the   anterior and posterior mitral valve leaflets and mitral annular   calcification.   5. Mild mitral valve regurgitation.   6. Sclerotic aortic valve with normal opening.   7. Mild-moderate tricuspid regurgitation.   8. Estimated pulmonary artery systolic pressure is 40.0 mmHg assuming a   right atrial pressure of 3 mmHg, which is consistent with mild pulmonary   hypertension.   9. There is no evidence of pericardial effusion.      -CCTA: n/a     -STRESS TEST: n/a     -CATHETERIZATION: LHC in 201 with mild disease and no documented obstructive CAD       -OTHER:  EC Lead ECG:   Ventricular Rate 92 BPM    Atrial Rate 92 BPM    P-R Interval 154 ms    QRS Duration 142 ms    Q-T Interval 398 ms    QTC Calculation(Bazett) 492 ms    P Axis 51 degrees    R Axis -18 degrees    T Axis 140 degrees    Diagnosis Line Sinus rhythm with Premature atrial complexes  Left bundle branch block  Abnormal ECG  When compared with ECG of 2024 02:18,  Premature atrial complexes are now Present  Confirmed by Karina Hood ([id number]) on 2024 2:07:16 PM ( @ 09:03)      TELEMETRY EVENTS:

## 2024-07-22 LAB
ALBUMIN SERPL ELPH-MCNC: 3 G/DL — LOW (ref 3.5–5.2)
ALP SERPL-CCNC: 165 U/L — HIGH (ref 30–115)
ALT FLD-CCNC: 12 U/L — SIGNIFICANT CHANGE UP (ref 0–41)
ANION GAP SERPL CALC-SCNC: 11 MMOL/L — SIGNIFICANT CHANGE UP (ref 7–14)
APPEARANCE UR: CLEAR — SIGNIFICANT CHANGE UP
AST SERPL-CCNC: 24 U/L — SIGNIFICANT CHANGE UP (ref 0–41)
BASOPHILS # BLD AUTO: 0.04 K/UL — SIGNIFICANT CHANGE UP (ref 0–0.2)
BASOPHILS NFR BLD AUTO: 0.7 % — SIGNIFICANT CHANGE UP (ref 0–1)
BILIRUB SERPL-MCNC: 0.3 MG/DL — SIGNIFICANT CHANGE UP (ref 0.2–1.2)
BILIRUB UR-MCNC: NEGATIVE — SIGNIFICANT CHANGE UP
BUN SERPL-MCNC: 38 MG/DL — HIGH (ref 10–20)
CALCIUM SERPL-MCNC: 8.6 MG/DL — SIGNIFICANT CHANGE UP (ref 8.4–10.5)
CHLORIDE SERPL-SCNC: 98 MMOL/L — SIGNIFICANT CHANGE UP (ref 98–110)
CO2 SERPL-SCNC: 26 MMOL/L — SIGNIFICANT CHANGE UP (ref 17–32)
COLOR SPEC: YELLOW — SIGNIFICANT CHANGE UP
CREAT SERPL-MCNC: 3.5 MG/DL — HIGH (ref 0.7–1.5)
DIFF PNL FLD: NEGATIVE — SIGNIFICANT CHANGE UP
EGFR: 12 ML/MIN/1.73M2 — LOW
EOSINOPHIL # BLD AUTO: 0.52 K/UL — SIGNIFICANT CHANGE UP (ref 0–0.7)
EOSINOPHIL NFR BLD AUTO: 9 % — HIGH (ref 0–8)
GLUCOSE SERPL-MCNC: 86 MG/DL — SIGNIFICANT CHANGE UP (ref 70–99)
GLUCOSE UR QL: NEGATIVE MG/DL — SIGNIFICANT CHANGE UP
HCT VFR BLD CALC: 31 % — LOW (ref 37–47)
HCT VFR BLD CALC: 34.3 % — LOW (ref 37–47)
HGB BLD-MCNC: 10.7 G/DL — LOW (ref 12–16)
HGB BLD-MCNC: 9.7 G/DL — LOW (ref 12–16)
IMM GRANULOCYTES NFR BLD AUTO: 0.3 % — SIGNIFICANT CHANGE UP (ref 0.1–0.3)
KETONES UR-MCNC: NEGATIVE MG/DL — SIGNIFICANT CHANGE UP
LEUKOCYTE ESTERASE UR-ACNC: ABNORMAL
LYMPHOCYTES # BLD AUTO: 1.55 K/UL — SIGNIFICANT CHANGE UP (ref 1.2–3.4)
LYMPHOCYTES # BLD AUTO: 26.7 % — SIGNIFICANT CHANGE UP (ref 20.5–51.1)
MAGNESIUM SERPL-MCNC: 2 MG/DL — SIGNIFICANT CHANGE UP (ref 1.8–2.4)
MCHC RBC-ENTMCNC: 31.2 G/DL — LOW (ref 32–37)
MCHC RBC-ENTMCNC: 31.3 G/DL — LOW (ref 32–37)
MCHC RBC-ENTMCNC: 31.7 PG — HIGH (ref 27–31)
MCHC RBC-ENTMCNC: 31.8 PG — HIGH (ref 27–31)
MCV RBC AUTO: 101.5 FL — HIGH (ref 81–99)
MCV RBC AUTO: 101.6 FL — HIGH (ref 81–99)
MONOCYTES # BLD AUTO: 0.95 K/UL — HIGH (ref 0.1–0.6)
MONOCYTES NFR BLD AUTO: 16.4 % — HIGH (ref 1.7–9.3)
MRSA PCR RESULT.: NEGATIVE — SIGNIFICANT CHANGE UP
NEUTROPHILS # BLD AUTO: 2.72 K/UL — SIGNIFICANT CHANGE UP (ref 1.4–6.5)
NEUTROPHILS NFR BLD AUTO: 46.9 % — SIGNIFICANT CHANGE UP (ref 42.2–75.2)
NITRITE UR-MCNC: NEGATIVE — SIGNIFICANT CHANGE UP
NRBC # BLD: 0 /100 WBCS — SIGNIFICANT CHANGE UP (ref 0–0)
NRBC # BLD: 0 /100 WBCS — SIGNIFICANT CHANGE UP (ref 0–0)
PH UR: 7 — SIGNIFICANT CHANGE UP (ref 5–8)
PHOSPHATE SERPL-MCNC: 4 MG/DL — SIGNIFICANT CHANGE UP (ref 2.1–4.9)
PLATELET # BLD AUTO: 205 K/UL — SIGNIFICANT CHANGE UP (ref 130–400)
PLATELET # BLD AUTO: 228 K/UL — SIGNIFICANT CHANGE UP (ref 130–400)
PMV BLD: 9.4 FL — SIGNIFICANT CHANGE UP (ref 7.4–10.4)
PMV BLD: 9.7 FL — SIGNIFICANT CHANGE UP (ref 7.4–10.4)
POTASSIUM SERPL-MCNC: 3.7 MMOL/L — SIGNIFICANT CHANGE UP (ref 3.5–5)
POTASSIUM SERPL-SCNC: 3.7 MMOL/L — SIGNIFICANT CHANGE UP (ref 3.5–5)
PROT SERPL-MCNC: 5.7 G/DL — LOW (ref 6–8)
PROT UR-MCNC: 300 MG/DL
RBC # BLD: 3.05 M/UL — LOW (ref 4.2–5.4)
RBC # BLD: 3.38 M/UL — LOW (ref 4.2–5.4)
RBC # FLD: 15 % — HIGH (ref 11.5–14.5)
RBC # FLD: 15.1 % — HIGH (ref 11.5–14.5)
SODIUM SERPL-SCNC: 135 MMOL/L — SIGNIFICANT CHANGE UP (ref 135–146)
SP GR SPEC: 1.02 — SIGNIFICANT CHANGE UP (ref 1–1.03)
TROPONIN T, HIGH SENSITIVITY RESULT: 94 NG/L — CRITICAL HIGH (ref 6–13)
UROBILINOGEN FLD QL: 1 MG/DL — SIGNIFICANT CHANGE UP (ref 0.2–1)
WBC # BLD: 5.8 K/UL — SIGNIFICANT CHANGE UP (ref 4.8–10.8)
WBC # BLD: 6.47 K/UL — SIGNIFICANT CHANGE UP (ref 4.8–10.8)
WBC # FLD AUTO: 5.8 K/UL — SIGNIFICANT CHANGE UP (ref 4.8–10.8)
WBC # FLD AUTO: 6.47 K/UL — SIGNIFICANT CHANGE UP (ref 4.8–10.8)

## 2024-07-22 PROCEDURE — 71045 X-RAY EXAM CHEST 1 VIEW: CPT | Mod: 26

## 2024-07-22 PROCEDURE — 99233 SBSQ HOSP IP/OBS HIGH 50: CPT

## 2024-07-22 RX ORDER — ACETAMINOPHEN 500 MG
325 TABLET ORAL EVERY 4 HOURS
Refills: 0 | Status: DISCONTINUED | OUTPATIENT
Start: 2024-07-22 | End: 2024-07-25

## 2024-07-22 RX ORDER — CHLORHEXIDINE GLUCONATE 500 MG/1
1 CLOTH TOPICAL DAILY
Refills: 0 | Status: DISCONTINUED | OUTPATIENT
Start: 2024-07-22 | End: 2024-08-01

## 2024-07-22 RX ORDER — FUROSEMIDE 10 MG/ML
40 INJECTION, SOLUTION INTRAVENOUS ONCE
Refills: 0 | Status: COMPLETED | OUTPATIENT
Start: 2024-07-22 | End: 2024-07-22

## 2024-07-22 RX ADMIN — SERTRALINE HYDROCHLORIDE 25 MILLIGRAM(S): 100 TABLET, FILM COATED ORAL at 12:29

## 2024-07-22 RX ADMIN — Medication 50 MILLIGRAM(S): at 05:03

## 2024-07-22 RX ADMIN — Medication 1 MILLIGRAM(S): at 12:30

## 2024-07-22 RX ADMIN — CHLORHEXIDINE GLUCONATE 1 APPLICATION(S): 500 CLOTH TOPICAL at 12:33

## 2024-07-22 RX ADMIN — PANTOPRAZOLE SODIUM 40 MILLIGRAM(S): 20 TABLET, DELAYED RELEASE ORAL at 05:04

## 2024-07-22 RX ADMIN — Medication 325 MILLIGRAM(S): at 22:27

## 2024-07-22 RX ADMIN — ATORVASTATIN CALCIUM 20 MILLIGRAM(S): 40 TABLET, FILM COATED ORAL at 22:25

## 2024-07-22 RX ADMIN — APIXABAN 2.5 MILLIGRAM(S): 5 TABLET, FILM COATED ORAL at 18:01

## 2024-07-22 RX ADMIN — SACUBITRIL AND VALSARTAN 1 TABLET(S): 49; 51 TABLET, FILM COATED ORAL at 05:03

## 2024-07-22 RX ADMIN — Medication 325 MILLIGRAM(S): at 22:25

## 2024-07-22 RX ADMIN — AMLODIPINE BESYLATE 5 MILLIGRAM(S): 2.5 TABLET ORAL at 05:04

## 2024-07-22 RX ADMIN — APIXABAN 2.5 MILLIGRAM(S): 5 TABLET, FILM COATED ORAL at 05:04

## 2024-07-22 RX ADMIN — FUROSEMIDE 40 MILLIGRAM(S): 10 INJECTION, SOLUTION INTRAVENOUS at 12:29

## 2024-07-22 RX ADMIN — TIMOLOL MALEATE 1 DROP(S): 2.5 SOLUTION/ DROPS OPHTHALMIC at 05:04

## 2024-07-22 NOTE — PROGRESS NOTE ADULT - ASSESSMENT
88-year-old female (Methodist) PMH HTN, DVT/PE 2018, NICM - HFrEF improved (LVEF 40-45% in 4/23) s/p AICD, RA, ESRD on HD TTS who was brought in from [facility] for SOB, admitted for possible RLL PNA.    Acute Hypoxemic Hypercapnic Respiratory Failure:   Acute HFrEF vs Pneumonia:   Non ischemic Cardiomyopathy: s/p AICD  Patient with SOB, cough, on exam noted with left lower lung rales.   CXR showed bilateral lower lung opacities and effusion.   Started on Lasix 40mg IV BID (still making a little urine).   Continue Entresto and Metoprolol.   Patient with cough, leukocytosis, could be pneumonia as well, started on Levaquin.   s/p BiPAP, incentive spirometry  , sputum Cx, MRSA PCR, RVP, U strep & legionella  Cardiology consult.     ESRD on HD TTS  Continue HD.     HTN  Continue Entresto, Amlodipine and Metoprolol.     history PE/DVT  RA    DVT prophylaxis: Eliquis  GI prophylaxis: PPI    Code status: DNR/DNI   #Progress Note Handoff:  Pending (specify):  cardiology follow up, improving SOB  Family discussion: with her son at bedside.   Disposition: from SNF

## 2024-07-22 NOTE — PROGRESS NOTE ADULT - SUBJECTIVE AND OBJECTIVE BOX
Nephrology progress note    THIS IS AN INCOMPLETE NOTE . FULL NOTE TO FOLLOW SHORTLY    Patient is seen and examined, events over the last 24 h noted .    Allergies:  cephalosporins (Angioedema)  Keflex (Swelling)    Hospital Medications:   MEDICATIONS  (STANDING):  amLODIPine   Tablet 5 milliGRAM(s) Oral daily  apixaban 2.5 milliGRAM(s) Oral two times a day  atorvastatin 20 milliGRAM(s) Oral at bedtime  folic acid 1 milliGRAM(s) Oral daily  levoFLOXacin IVPB 500 milliGRAM(s) IV Intermittent every 48 hours  metoprolol succinate ER 50 milliGRAM(s) Oral daily  pantoprazole    Tablet 40 milliGRAM(s) Oral before breakfast  sacubitril 24 mG/valsartan 26 mG 1 Tablet(s) Oral <User Schedule>  sertraline 25 milliGRAM(s) Oral daily  timolol 0.5% Solution 1 Drop(s) Both EYES two times a day  vancomycin  IVPB 1500 milliGRAM(s) IV Intermittent once        VITALS:  T(F): 98.2 (07-22-24 @ 04:03), Max: 100.1 (07-21-24 @ 20:14)  HR: 66 (07-22-24 @ 04:03)  BP: 135/75 (07-22-24 @ 04:03)  RR: 18 (07-22-24 @ 08:30)  SpO2: 98% (07-22-24 @ 08:30)  Wt(kg): --    07-20 @ 07:01  -  07-21 @ 07:00  --------------------------------------------------------  IN: 0 mL / OUT: 2000 mL / NET: -2000 mL    07-21 @ 07:01  -  07-22 @ 07:00  --------------------------------------------------------  IN: 586 mL / OUT: 400 mL / NET: 186 mL          PHYSICAL EXAM:  Constitutional: NAD  HEENT: anicteric sclera, oropharynx clear, MMM  Neck: No JVD  Respiratory: CTAB, no wheezes, rales or rhonchi  Cardiovascular: S1, S2, RRR  Gastrointestinal: BS+, soft, NT/ND  Extremities: No cyanosis or clubbing. No peripheral edema  :  No carlos.   Skin: No rashes    LABS:  07-22    135  |  98  |  38<H>  ----------------------------<  86  3.7   |  26  |  3.5<H>    Ca    8.6      22 Jul 2024 05:33  Phos  4.0     07-22  Mg     2.0     07-22    TPro  5.7<L>  /  Alb  3.0<L>  /  TBili  0.3  /  DBili      /  AST  24  /  ALT  12  /  AlkPhos  165<H>  07-22                          9.7    5.80  )-----------( 205      ( 22 Jul 2024 05:33 )             31.0       Urine Studies:  Urinalysis Basic - ( 22 Jul 2024 05:33 )    Color:  / Appearance:  / SG:  / pH:   Gluc: 86 mg/dL / Ketone:   / Bili:  / Urobili:    Blood:  / Protein:  / Nitrite:    Leuk Esterase:  / RBC:  / WBC    Sq Epi:  / Non Sq Epi:  / Bacteria:           Iron 33, TIBC 147, %sat 22      [04-04-24 @ 06:44]  Ferritin 785      [04-04-24 @ 06:44]  PTH -- (Ca 8.2)      [04-09-24 @ 05:43]   35  PTH -- (Ca 8.2)      [03-24-24 @ 20:00]   138  Vitamin D (25OH) 11      [04-09-24 @ 05:43]  Lipid: chol 144, , HDL 39, LDL --      [03-22-24 @ 06:14]    HBsAb Nonreact      [03-27-24 @ 15:55]  HBsAg Nonreact      [04-01-24 @ 15:52]  HBcAb Nonreact      [04-03-24 @ 16:02]  HCV 0.20, Nonreact      [04-01-24 @ 15:52]    OSIEL: titer 1:160, pattern DFS70      [04-27-22 @ 16:00]  Rheumatoid Factor 31      [07-18-22 @ 05:21]  Free Light Chains: kappa 13.15, lambda 10.99, ratio = 1.20      [12-21 @ 07:39]  Immunofixation Serum:   No Monoclonal Band Identified    Reference Range: None Detected      [12-20-22 @ 10:57]  SPEP Interpretation: Normal Electrophoresis Pattern      [12-20-22 @ 10:57]  Immunofixation Urine: Reference Range: None Detected      [12-25-20 @ 12:35]  UPEP Interpretation: Mild Selective (Glomerular) Proteinuria      [12-25-20 @ 12:35]      RADIOLOGY & ADDITIONAL STUDIES:   Nephrology progress note    Patient is seen and examined, events over the last 24 h noted .  Lying in bed comfortable     Allergies:  cephalosporins (Angioedema)  Keflex (Swelling)    Hospital Medications:   MEDICATIONS  (STANDING):  amLODIPine   Tablet 5 milliGRAM(s) Oral daily  apixaban 2.5 milliGRAM(s) Oral two times a day  atorvastatin 20 milliGRAM(s) Oral at bedtime  folic acid 1 milliGRAM(s) Oral daily  levoFLOXacin IVPB 500 milliGRAM(s) IV Intermittent every 48 hours  metoprolol succinate ER 50 milliGRAM(s) Oral daily  pantoprazole    Tablet 40 milliGRAM(s) Oral before breakfast  sacubitril 24 mG/valsartan 26 mG 1 Tablet(s) Oral <User Schedule>  sertraline 25 milliGRAM(s) Oral daily  timolol 0.5% Solution 1 Drop(s) Both EYES two times a day  vancomycin  IVPB 1500 milliGRAM(s) IV Intermittent once        VITALS:  T(F): 98.2 (07-22-24 @ 04:03), Max: 100.1 (07-21-24 @ 20:14)  HR: 66 (07-22-24 @ 04:03)  BP: 135/75 (07-22-24 @ 04:03)  RR: 18 (07-22-24 @ 08:30)  SpO2: 98% (07-22-24 @ 08:30)  Wt(kg): --    07-20 @ 07:01  -  07-21 @ 07:00  --------------------------------------------------------  IN: 0 mL / OUT: 2000 mL / NET: -2000 mL    07-21 @ 07:01  -  07-22 @ 07:00  --------------------------------------------------------  IN: 586 mL / OUT: 400 mL / NET: 186 mL          PHYSICAL EXAM:  Constitutional: NAD  Respiratory: CTAB  Cardiovascular: S1, S2, RRR  Gastrointestinal: BS+, soft, NT/ND  Extremities: No cyanosis or clubbing. No peripheral edema  :  No carlos.   Skin: No rashes    LABS:  07-22    135  |  98  |  38<H>  ----------------------------<  86  3.7   |  26  |  3.5<H>    Ca    8.6      22 Jul 2024 05:33  Phos  4.0     07-22  Mg     2.0     07-22    TPro  5.7<L>  /  Alb  3.0<L>  /  TBili  0.3  /  DBili      /  AST  24  /  ALT  12  /  AlkPhos  165<H>  07-22                          9.7    5.80  )-----------( 205      ( 22 Jul 2024 05:33 )             31.0       Urine Studies:  Urinalysis Basic - ( 22 Jul 2024 05:33 )    Color:  / Appearance:  / SG:  / pH:   Gluc: 86 mg/dL / Ketone:   / Bili:  / Urobili:    Blood:  / Protein:  / Nitrite:    Leuk Esterase:  / RBC:  / WBC    Sq Epi:  / Non Sq Epi:  / Bacteria:           Iron 33, TIBC 147, %sat 22      [04-04-24 @ 06:44]  Ferritin 785      [04-04-24 @ 06:44]  PTH -- (Ca 8.2)      [04-09-24 @ 05:43]   35  PTH -- (Ca 8.2)      [03-24-24 @ 20:00]   138  Vitamin D (25OH) 11      [04-09-24 @ 05:43]  Lipid: chol 144, , HDL 39, LDL --      [03-22-24 @ 06:14]    HBsAb Nonreact      [03-27-24 @ 15:55]  HBsAg Nonreact      [04-01-24 @ 15:52]  HBcAb Nonreact      [04-03-24 @ 16:02]  HCV 0.20, Nonreact      [04-01-24 @ 15:52]    OSIEL: titer 1:160, pattern DFS70      [04-27-22 @ 16:00]  Rheumatoid Factor 31      [07-18-22 @ 05:21]  Free Light Chains: kappa 13.15, lambda 10.99, ratio = 1.20      [12-21 @ 07:39]  Immunofixation Serum:   No Monoclonal Band Identified    Reference Range: None Detected      [12-20-22 @ 10:57]  SPEP Interpretation: Normal Electrophoresis Pattern      [12-20-22 @ 10:57]  Immunofixation Urine: Reference Range: None Detected      [12-25-20 @ 12:35]  UPEP Interpretation: Mild Selective (Glomerular) Proteinuria      [12-25-20 @ 12:35]      RADIOLOGY & ADDITIONAL STUDIES:

## 2024-07-22 NOTE — CONSULT NOTE ADULT - ASSESSMENT
ASSESSMENT  88-year-old female (Gnosticist) PMH HTN, DVT/PE 2018, NICM - HFrEF improved (LVEF 40-45% in 4/23) s/p AICD, RA, ESRD on HD TTS who was brought in from [facility] for SOB, admitted for possible PNA.    # Lung Effusion r/o PNA  - Initial CXR - bilateral opacities + effusions, LLL opacity  - Initial procal - 0.23  - s/p vancomycin  - Bcx (-) so far  - SCx pending  - MRSA (-), RVP (-)  - Transition levofloxacin to oral after next HD session for 5 days  - Urine strep + legionella pending    This is a pended note. All final recommendations to follow pending discussion with ID Attending    ASSESSMENT  88-year-old female (Zoroastrianism) PMH HTN, DVT/PE 2018, NICM - HFrEF improved (LVEF 40-45% in 4/23) s/p AICD, RA, ESRD on HD TTS who was brought in from [facility] for SOB, admitted for possible PNA.    # Lung Effusion r/o PNA  - Initial CXR - bilateral opacities + effusions, LLL opacity  - Initial procal - 0.23  - s/p vancomycin  - Bcx (-) so far  - SCx pending  - MRSA (-), RVP (-)  - Transition levofloxacin to oral after next HD session for 5 days 500mg   - Urine strep + legionella pending

## 2024-07-22 NOTE — PROGRESS NOTE ADULT - ASSESSMENT
88-year-old female (Shinto) PMH HTN, DVT/PE 2018, NICM - HFrEF improved (LVEF 40-45% in 4/23) s/p AICD, RA, ESRD on HD TTS who was brought in from [facility] for SOB.    # ESRD on HD T TH Sat sp HD saturday   # SOB volume overload? CHF     -for HD in AM   - IP at target / Hb noted ok   - appreciate cardio notes / medical ttt   - on levaquin vanco for ? PNA dose to HD regimen   will follow

## 2024-07-22 NOTE — PROGRESS NOTE ADULT - SUBJECTIVE AND OBJECTIVE BOX
SUBJECTIVE/OVERNIGHT EVENTS  Today is hospital day 2d. This morning patient was seen and examined at bedside, resting comfortably in bed. No acute or major events overnight. Pt complaints of weakness and fatigue. Denies any other complaints      CODE STATUS: full code     FAMILY COMMUNICATION  Contact date:  Name of person contacted:  Relationship to patient:  Communication details:    MEDICATIONS  STANDING MEDICATIONS  amLODIPine   Tablet 5 milliGRAM(s) Oral daily  apixaban 2.5 milliGRAM(s) Oral two times a day  atorvastatin 20 milliGRAM(s) Oral at bedtime  chlorhexidine 2% Cloths 1 Application(s) Topical daily  folic acid 1 milliGRAM(s) Oral daily  levoFLOXacin IVPB 500 milliGRAM(s) IV Intermittent every 48 hours  metoprolol succinate ER 50 milliGRAM(s) Oral daily  pantoprazole    Tablet 40 milliGRAM(s) Oral before breakfast  sacubitril 24 mG/valsartan 26 mG 1 Tablet(s) Oral <User Schedule>  sertraline 25 milliGRAM(s) Oral daily  timolol 0.5% Solution 1 Drop(s) Both EYES two times a day  vancomycin  IVPB 1500 milliGRAM(s) IV Intermittent once    PRN MEDICATIONS    VITALS  T(F): 97.4 (24 @ 12:20), Max: 100.1 (24 @ 20:14)  HR: 67 (24 @ 12:20) (66 - 74)  BP: 137/74 (24 @ 12:20) (120/68 - 137/74)  RR: 18 (24 @ 12:20) (18 - 18)  SpO2: 98% (24 @ 12:20) (98% - 100%)    PHYSICAL EXAM  GENERAL  (x ) NAD, lying in bed comfortably     (  ) obtunded     (  ) lethargic     (  ) somnolent    HEAD  ( x ) Atraumatic     (  ) hematoma     (  ) laceration (specify location:       )     NECK  ( x ) Supple     (  ) neck stiffness     (  ) nuchal rigidity     (  )  no JVD     (  ) JVD present ( -- cm)    HEART  Rate -->  (  x) normal rate    (  ) bradycardic    (  ) tachycardic  Rhythm -->  (x) regular    (  ) regularly irregular    (  ) irregularly irregular  Murmurs -->  ( x ) normal s1/s2    (  ) systolic murmur    (  ) diastolic murmur    (  ) continuous murmur     (  ) S3 present    (  ) S4 present    LUNGS  ( x )Unlabored respirations     (  ) tachypnea  (  x) B/L air entry     (  ) decreased breath sounds in:  (location     )    (  ) no adventitious sound     ( x ) crackles     (  ) wheezing      (  ) rhonchi      (specify location:       )  (  ) chest wall tenderness (specify location:  L>R     )    ABDOMEN  (x  ) Soft     (  ) tense   |   ( x ) nondistended     (  ) distended   |   (  ) +BS     (  ) hypoactive bowel sounds     (  ) hyperactive bowel sounds  (x  ) nontender     (  ) RUQ tenderness     (  ) RLQ tenderness     (  ) LLQ tenderness     (  ) epigastric tenderness     (  ) diffuse tenderness  (  ) Splenomegaly      (  ) Hepatomegaly      (  ) Jaundice     (  ) ecchymosis     EXTREMITIES  ( x ) Normal     (  ) Rash     (  ) ecchymosis     (  ) varicose veins      (  ) pitting edema     (  ) non-pitting edema   (  ) ulceration     (  ) gangrene:     (location:     )    NERVOUS SYSTEM  ( x ) A&Ox3     (  ) confused     (  ) lethargic  CN II-XII:     (  ) Intact     (  ) focal deficits  (Specify:     )   Upper extremities:     (  ) strength X/5     (  ) focal deficit (specify:    )  Lower extremities:     (  ) strength  X/5    (  ) focal deficit (specify:    )    SKIN  ( x ) No rashes or lesions     (  ) maculopapular rash     (  ) pustules     (  ) vesicles     (  ) ulcer     (  ) ecchymosis     (specify location:     )    (  ) Indwelling Morgan Catheter   Date insterted:    Reason (  ) Critical illness     (  ) urinary retention    (  ) Accurate Ins/Outs Monitoring     (  ) CMO patient    (  ) Central Line  Date inserted:  Location: (  ) Right IJ   (  ) Left IJ   (  ) Right Fem   (  ) Left Fem    (  ) SPC  (  ) pigtail  (  ) PEG tube  (  ) colostomy  (  ) jejunostomy  (  ) U-Dall    LABS             10.7   6.47  )-----------( 228      ( 24 @ 11:18 )             34.3     135  |  98  |  38  -------------------------<  86   24 @ 05:33  3.7  |  26  |  3.5    Ca      8.6     24 @ 05:33  Phos   4.0     24 @ 05:33  Mg     2.0     24 @ 05:33    TPro  5.7  /  Alb  3.0  /  TBili  0.3  /  DBili  x   /  AST  24  /  ALT  12  /  AlkPhos  165  /  GGT  x     24 @ 05:33      Troponin T, High Sensitivity Result: 133 ng/L (24 @ 02:15)  Troponin T, High Sensitivity Result: 123 ng/L (24 @ 23:30)  Pro-Brain Natriuretic Peptide: 26308 pg/mL (24 @ 09:40)    Urinalysis Basic - ( 2024 11:36 )    Color: Yellow / Appearance: Clear / S.018 / pH: x  Gluc: x / Ketone: Negative mg/dL  / Bili: Negative / Urobili: 1.0 mg/dL   Blood: x / Protein: 300 mg/dL / Nitrite: Negative   Leuk Esterase: Trace / RBC: 3 /HPF / WBC 13 /HPF   Sq Epi: x / Non Sq Epi: 6 /HPF / Bacteria: Negative /HPF          Culture - Blood (collected 2024 11:31)  Source: .Blood Blood  Preliminary Report (2024 22:01):    No growth at 24 hours    Culture - Blood (collected 2024 11:31)  Source: .Blood Blood  Preliminary Report (2024 22:01):    No growth at 24 hours      IMAGING

## 2024-07-22 NOTE — PHARMACOTHERAPY INTERVENTION NOTE - COMMENTS
Patient started empirically on vancomycin, in setting of HD. MRSA swab negative, consider discontinuation of vancomycin, blood culture negative, sputum culture pending collection. If continuing with vancomycin recommend changing loading dose from 1500 mg to 1250 mg x 1 (weight = 56 kg) per vancomycin in HD policy. Recommend vancomycin level prior to next HD session to aid with dosing of vancomycin  Patient started empirically on vancomycin, in setting of HD. MRSA swab negative, consider discontinuation of vancomycin, blood culture negative, sputum culture pending collection. If continuing with vancomycin recommend changing loading dose from 1500 mg to 1250 mg x 1 (weight = 56 kg) per vancomycin in HD policy. Recommend vancomycin level prior to next HD session to aid with dosing of vancomycin.  Vancomycin discontinued continue with levofloxacin per ID recs.

## 2024-07-22 NOTE — PROGRESS NOTE ADULT - ASSESSMENT
Ms. Marrufo is an 88-year-old female (Mormonism) PMH HTN, DVT/PE 2018, NICM - HFrEF improved (LVEF 40-45% in 4/23) s/p AICD, RA, ESRD on HD TTS who was brought in from [facility] for SOB, admitted for possible RLL PNA.    Acute Hypoxemic Hypercapnic Respiratory Failure 2/2 CAP vs. HFrEF Exacerbation  -patient on Eliquis, doubt PE  -was on BiPAP at time of encounter  -CXR RLL infiltrate   Procal 0.26  Plan:  vancomycin 1500 mg IVPB x1 then dose with HD per ID pharm  levofloxacin 500 mg IV q48h. If given on HD day, give after HD  wean off BiPAP as tolerate  incentive spirometry  volume contraction with HD as tolerated  f/u BCx, sputum Cx, procal, MRSA PCR, RVP, U strep & legionella  f/u repeat trop  repeat CXR in AM  ID c/s    ESRD on HD TTS  -HD per nephro    HTN  HFrEF Improved  HO PE/DVT  -c/w Entresto 24 mg qd (unclear why not on bid dosing)  -c/w amlodipine 5 mg qd  -c/w atorvastatin 20 mg qhs  -c/w metoprolol ER 50 mg qd  -c/w Eliquis 2.5 mg bid    RA  -OP f/u    DVT prophylaxis: Eliquis  GI prophylaxis: PPI  Diet: renal  Code status: DNR/DNI   Activity: IAT PT    Med rec confirmed with [facility]   Ms. Marrufo is an 88-year-old female (Anglican) PMH HTN, DVT/PE 2018, NICM - HFrEF improved (LVEF 40-45% in 4/23) s/p AICD, RA, ESRD on HD TTS who was brought in from [facility] for SOB, admitted for possible RLL PNA.    Acute Hypoxemic Hypercapnic Respiratory Failure 2/2 CAP vs. HFrEF Exacerbation  -patient on Eliquis, doubt PE  -was on BiPAP at time of encounter  -CXR on admission RLL infiltrate   Procal 0.23  Plan:  -c/w levofloxacin 500 mg IV q48h. If given on HD day, give after HD  wean off BiPAP as tolerate  incentive spirometry  volume contraction with HD as tolerated  -BCx NGTD:      -f/u results   f/u BCx, sputum Cx, MRSA PCR, RVP, U strep & legionella  f/u repeat trop  -CXR today unchanged B/L opacities     -one dose of IV lasix given today  -f/u ID consult recs     ESRD on HD TTS  -HD per nephro    HTN  HFrEF Improved  HO PE/DVT  -c/w Entresto 24 mg qd (unclear why not on bid dosing)  -c/w amlodipine 5 mg qd  -c/w atorvastatin 20 mg qhs  -c/w metoprolol ER 50 mg qd  -c/w Eliquis 2.5 mg bid    RA  -OP f/u    DVT prophylaxis: Eliquis  GI prophylaxis: PPI  Diet: renal  Code status: DNR/DNI   Activity: IAT PT  Dispo: c/w antibx,

## 2024-07-22 NOTE — CONSULT NOTE ADULT - SUBJECTIVE AND OBJECTIVE BOX
RACHEL SUMMERS  88y, Female  Allergy: cephalosporins (Angioedema)  Keflex (Swelling)      CHIEF COMPLAINT: Pneumonia (2024 12:37)      HPI:  88-year-old female ([employer]) PMH HTN, DVT/PE 2018, NICM - HFrEF improved (LVEF 40-45% in ) s/p AICD, RA, ESRD on HD TTS who was brought in from [facility] for SOB, admitted for possible PNA. Denies fever, cough, phlegm.    Infectious Diseases History:  Old Micro Data/Cultures:     FAMILY HISTORY:  FH: arthritis  sister      PAST MEDICAL & SURGICAL HISTORY:  Hypertension      Gout      Diverticulitis  sigmoid      Rheumatoid arthritis      DVT, lower extremity  Bilateral      Pulmonary embolism      Chronic HFrEF (heart failure with reduced ejection fraction)      AICD (automatic cardioverter/defibrillator) present      ESRD on dialysis      Artificial pacemaker      History of appendectomy      History of tonsillectomy      History of hysterectomy      H/O hernia repair          SOCIAL HISTORY  Social History:  Denies tobacco, etoh or illicit drug use (2022 00:22)      Recent Travel:  Other Exposures:     ROS  As noted in the HPI.    VITALS:  T(F): 97.4, Max: 100.1 (24 @ 20:14)  HR: 67  BP: 137/74  RR: 18Vital Signs Last 24 Hrs  T(C): 36.3 (2024 12:20), Max: 37.8 (2024 20:14)  T(F): 97.4 (2024 12:20), Max: 100.1 (2024 20:14)  HR: 67 (2024 12:20) (66 - 74)  BP: 137/74 (2024 12:20) (120/68 - 137/74)  BP(mean): 95 (2024 12:20) (86 - 95)  RR: 18 (2024 12:20) (18 - 18)  SpO2: 98% (2024 12:20) (98% - 100%)    Parameters below as of 2024 12:20  Patient On (Oxygen Delivery Method): nasal cannula  O2 Flow (L/min): 3      PHYSICAL EXAM:  GENERAL: Awake  HEAD:  Atraumatic, Normocephalic  EYES: Conjunctiva and sclera clear  NERVOUS SYSTEM: Alert & Oriented X3  CHEST/LUNG: Basilar crackles present bilaterally  HEART: S1+S2 heard  ABDOMEN: Soft, Nontender, Nondistended; Bowel sounds present  EXTREMITIES:  2+ Peripheral Pulses      TESTS & MEASUREMENTS:                        10.7   6.47  )-----------( 228      ( 2024 11:18 )             34.3         135  |  98  |  38<H>  ----------------------------<  86  3.7   |  26  |  3.5<H>    Ca    8.6      2024 05:33  Phos  4.0       Mg     2.0         TPro  5.7<L>  /  Alb  3.0<L>  /  TBili  0.3  /  DBili  x   /  AST  24  /  ALT  12  /  AlkPhos  165<H>        LIVER FUNCTIONS - ( 2024 05:33 )  Alb: 3.0 g/dL / Pro: 5.7 g/dL / ALK PHOS: 165 U/L / ALT: 12 U/L / AST: 24 U/L / GGT: x           Urinalysis Basic - ( 2024 11:36 )    Color: Yellow / Appearance: Clear / S.018 / pH: x  Gluc: x / Ketone: Negative mg/dL  / Bili: Negative / Urobili: 1.0 mg/dL   Blood: x / Protein: 300 mg/dL / Nitrite: Negative   Leuk Esterase: Trace / RBC: 3 /HPF / WBC 13 /HPF   Sq Epi: x / Non Sq Epi: 6 /HPF / Bacteria: Negative /HPF        Culture - Blood (collected 24 @ 11:31)  Source: .Blood Blood  Preliminary Report (24 @ 22:01):    No growth at 24 hours    Culture - Blood (collected 24 @ 11:31)  Source: .Blood Blood  Preliminary Report (24 @ 22:01):    No growth at 24 hours        Blood Gas Venous - Lactate: 1.5 mmol/L (24 @ 09:29)      INFECTIOUS DISEASES TESTING  MRSA PCR Result.: Negative (24 @ 11:50)  Hepatitis B Surface Antigen: Nonreact (24 @ 15:52)  Hepatitis B Surface Antigen: Nonreact (24 @ 05:49)  Hepatitis B Surface Antigen: Nonreact (24 @ 15:55)      RADIOLOGY & ADDITIONAL TESTS:  I have personally reviewed the last available Chest xray  CXR  Xray Chest 1 View- PORTABLE-Urgent:   ACC: [id number] EXAM:  XR CHEST PORTABLE URGENT 1V   ORDERED BY: PAUL SERRANO     PROCEDURE DATE:  2024          INTERPRETATION:  CLINICAL HISTORY / REASON FOR EXAM: Shortness of breath.    COMPARISON: Chest radiograph from 2024.    TECHNIQUE/POSITIONING: Satisfactory. Single image, AP chest radiograph.    FINDINGS:    SUPPORT DEVICES: ICD implant overlies the left hemithorax. Double lumen   dialysis catheter overlies the right hemithorax with distal tip overlying   the right atrium .    CARDIAC/MEDIASTINUM/HILUM: Unremarkable cardiac silhouette.    LUNG PARENCHYMA/PLEURA: Bibasilar opacities and effusions. No   pneumothorax.    SKELETON/SOFT TISSUES: Unremarkable.      IMPRESSION:    Bibasilar opacities and effusions.    --- End of Report ---            KATI JOSEPH MD; Attending Radiologist  This document has been electronically signed. 2024  2:52PM (24 @ 09:42)      CT      CARDIOLOGY TESTING  12 Lead ECG:   Ventricular Rate 82 BPM    Atrial Rate 82 BPM    P-R Interval 148 ms    QRS Duration 134 ms    Q-T Interval 470 ms    QTC Calculation(Bazett) 549 ms    P Axis 0 degrees    R Axis -21 degrees    T Axis 161 degrees    Diagnosis Line Normal sinus rhythm  Non-specific intra-ventricular conduction block  Inferior infarct , age undetermined  Possible Anterolateral infarct , age undetermined  Abnormal ECG    Confirmed by Karina Hood ([id number]) on 2024 10:02:32 AM (24 @ 02:52)  12 Lead ECG:   Ventricular Rate 92 BPM    Atrial Rate 92 BPM    P-R Interval 154 ms    QRS Duration 142 ms    Q-T Interval 398 ms    QTC Calculation(Bazett) 492 ms    P Axis 51 degrees    R Axis -18 degrees    T Axis 140 degrees    Diagnosis Line Sinus rhythm with Premature atrial complexes  Left bundle branch block  Abnormal ECG  When compared with ECG of 2024 02:18,  Premature atrial complexes are now Present  Confirmed by Karina Hood ([id number]) on 2024 2:07:16 PM (24 @ 09:03)      All available historical records have been reviewed    MEDICATIONS  amLODIPine   Tablet 5  apixaban 2.5  atorvastatin 20  chlorhexidine 2% Cloths 1  folic acid 1  levoFLOXacin IVPB 500  metoprolol succinate ER 50  pantoprazole    Tablet 40  sacubitril 24 mG/valsartan 26 mG 1  sertraline 25  timolol 0.5% Solution 1  vancomycin  IVPB 1500      ANTIBIOTICS:  levoFLOXacin IVPB 500 milliGRAM(s) IV Intermittent every 48 hours  vancomycin  IVPB 1500 milliGRAM(s) IV Intermittent once      All available historical data has been reviewed

## 2024-07-22 NOTE — CONSULT NOTE ADULT - NS ATTEST RISK PROBLEM GEN_ALL_CORE FT
One undiagnosed new problem with uncertain prognosis - PNA vs fluid overload    - I assisted with prescription drug management (i.e discharge antibiotics)  I have personally seen and examined this patient.  I have reviewed all pertinent clinical information and reviewed all relevant imaging and diagnostic studies personally.   If possible, I counseled the patient about diagnostic testing and treatment plan.   I discussed my recommendations with the primary team and any family members at bedside.

## 2024-07-22 NOTE — CONSULT NOTE ADULT - ATTENDING COMMENTS
I have personally seen and examined this patient.  I have fully participated in the care of this patient.  I have reviewed all pertinent clinical information, including history, physical exam, plan and note.  I have reviewed all pertinent clinical information and reviewed all relevant imaging and diagnostic studies personally.  My addendum includes the final recommendations and supersedes the resident's note.       #Possible PNA  Tm 100.8 P>90 RR>20 Hypertensive in the ER  WBC 12--> 5  BNP 38k  CXR possible LLL PNA, overall stable    - sputum cx  - Send urine Ag for Strep and Legionella   - PO levaquin 500mg post HD on HD days to complete 5-7 days    If any questions, please text or call on [employer] Teams  Please continue to update ID with any pertinent new clinical, laboratory or radiographic findings

## 2024-07-22 NOTE — PROGRESS NOTE ADULT - SUBJECTIVE AND OBJECTIVE BOX
RACHEL SUMMERS  88y  Female      Patient is a 88y old  Female who presents with a chief complaint of Pneumonia (2024 14:21)      INTERVAL HPI/OVERNIGHT EVENTS:  She feels ok, she denies chest pain, still with cough,  no fever, temp max was 100.1  Vital Signs Last 24 Hrs  T(C): 36.3 (2024 12:20), Max: 37.8 (2024 20:14)  T(F): 97.4 (2024 12:20), Max: 100.1 (2024 20:14)  HR: 67 (2024 12:20) (66 - 74)  BP: 137/74 (2024 12:20) (120/68 - 137/74)  BP(mean): 95 (2024 12:20) (86 - 95)  RR: 18 (2024 12:20) (18 - 18)  SpO2: 98% (2024 12:20) (98% - 100%)    Parameters below as of 2024 12:20  Patient On (Oxygen Delivery Method): nasal cannula  O2 Flow (L/min): 3        24 @ 07:  -  24 @ 07:00  --------------------------------------------------------  IN: 586 mL / OUT: 400 mL / NET: 186 mL    24 @ 07:24 @ 15:14  --------------------------------------------------------  IN: 472 mL / OUT: 200 mL / NET: 272 mL            Consultant(s) Notes Reviewed:  [x ] YES  [ ] NO          MEDICATIONS  (STANDING):  amLODIPine   Tablet 5 milliGRAM(s) Oral daily  apixaban 2.5 milliGRAM(s) Oral two times a day  atorvastatin 20 milliGRAM(s) Oral at bedtime  chlorhexidine 2% Cloths 1 Application(s) Topical daily  folic acid 1 milliGRAM(s) Oral daily  levoFLOXacin IVPB 500 milliGRAM(s) IV Intermittent every 48 hours  metoprolol succinate ER 50 milliGRAM(s) Oral daily  pantoprazole    Tablet 40 milliGRAM(s) Oral before breakfast  sacubitril 24 mG/valsartan 26 mG 1 Tablet(s) Oral <User Schedule>  sertraline 25 milliGRAM(s) Oral daily  timolol 0.5% Solution 1 Drop(s) Both EYES two times a day    MEDICATIONS  (PRN):      LABS                          10.7   6.47  )-----------( 228      ( 2024 11:18 )             34.3         135  |  98  |  38<H>  ----------------------------<  86  3.7   |  26  |  3.5<H>    Ca    8.6      2024 05:33  Phos  4.0       Mg     2.0         TPro  5.7<L>  /  Alb  3.0<L>  /  TBili  0.3  /  DBili  x   /  AST  24  /  ALT  12  /  AlkPhos  165<H>        Urinalysis Basic - ( 2024 11:36 )    Color: Yellow / Appearance: Clear / S.018 / pH: x  Gluc: x / Ketone: Negative mg/dL  / Bili: Negative / Urobili: 1.0 mg/dL   Blood: x / Protein: 300 mg/dL / Nitrite: Negative   Leuk Esterase: Trace / RBC: 3 /HPF / WBC 13 /HPF   Sq Epi: x / Non Sq Epi: 6 /HPF / Bacteria: Negative /HPF        Lactate Trend        CAPILLARY BLOOD GLUCOSE          Culture - Blood (collected 24 @ 11:31)  Source: .Blood Blood  Preliminary Report (24 @ 22:01):    No growth at 24 hours    Culture - Blood (collected 24 @ 11:31)  Source: .Blood Blood  Preliminary Report (24 @ 22:01):    No growth at 24 hours        RADIOLOGY & ADDITIONAL TESTS:    Imaging Personally Reviewed:  [ ] YES  [ ] NO    HEALTH ISSUES - PROBLEM Dx:          PHYSICAL EXAM:  GENERAL: NAD, well-developed.  HEAD:  Atraumatic, Normocephalic.  EYES: EOMI, PERRLA, conjunctiva and sclera clear.  NECK: Supple, increased JVP  CHEST/LUNG: bilateral lower lung rales worse on the left.   HEART: Regular rate and rhythm; S1 S2.   ABDOMEN: Soft, Nontender, Nondistended; Bowel sounds present.  EXTREMITIES:  2+ Peripheral Pulses, No clubbing, cyanosis, or edema.  PSYCH: AAOx3.  NEUROLOGY: non-focal.  SKIN: No rashes or lesions.

## 2024-07-23 ENCOUNTER — RESULT REVIEW (OUTPATIENT)
Age: 89
End: 2024-07-23

## 2024-07-23 LAB
ALBUMIN SERPL ELPH-MCNC: 3 G/DL — LOW (ref 3.5–5.2)
ALP SERPL-CCNC: 172 U/L — HIGH (ref 30–115)
ALT FLD-CCNC: 14 U/L — SIGNIFICANT CHANGE UP (ref 0–41)
ANION GAP SERPL CALC-SCNC: 16 MMOL/L — HIGH (ref 7–14)
AST SERPL-CCNC: 30 U/L — SIGNIFICANT CHANGE UP (ref 0–41)
BASOPHILS # BLD AUTO: 0 K/UL — SIGNIFICANT CHANGE UP (ref 0–0.2)
BASOPHILS NFR BLD AUTO: 0 % — SIGNIFICANT CHANGE UP (ref 0–1)
BILIRUB SERPL-MCNC: 0.5 MG/DL — SIGNIFICANT CHANGE UP (ref 0.2–1.2)
BUN SERPL-MCNC: 43 MG/DL — HIGH (ref 10–20)
CALCIUM SERPL-MCNC: 8.3 MG/DL — LOW (ref 8.4–10.5)
CHLORIDE SERPL-SCNC: 94 MMOL/L — LOW (ref 98–110)
CO2 SERPL-SCNC: 23 MMOL/L — SIGNIFICANT CHANGE UP (ref 17–32)
CREAT SERPL-MCNC: 4 MG/DL — HIGH (ref 0.7–1.5)
EGFR: 10 ML/MIN/1.73M2 — LOW
EOSINOPHIL # BLD AUTO: 0.25 K/UL — SIGNIFICANT CHANGE UP (ref 0–0.7)
EOSINOPHIL NFR BLD AUTO: 3.7 % — SIGNIFICANT CHANGE UP (ref 0–8)
GLUCOSE SERPL-MCNC: 72 MG/DL — SIGNIFICANT CHANGE UP (ref 70–99)
HCT VFR BLD CALC: 33.4 % — LOW (ref 37–47)
HGB BLD-MCNC: 10.5 G/DL — LOW (ref 12–16)
LEGIONELLA AG UR QL: NEGATIVE — SIGNIFICANT CHANGE UP
LYMPHOCYTES # BLD AUTO: 1.43 K/UL — SIGNIFICANT CHANGE UP (ref 1.2–3.4)
LYMPHOCYTES # BLD AUTO: 21.3 % — SIGNIFICANT CHANGE UP (ref 20.5–51.1)
MAGNESIUM SERPL-MCNC: 1.9 MG/DL — SIGNIFICANT CHANGE UP (ref 1.8–2.4)
MCHC RBC-ENTMCNC: 31.3 PG — HIGH (ref 27–31)
MCHC RBC-ENTMCNC: 31.4 G/DL — LOW (ref 32–37)
MCV RBC AUTO: 99.4 FL — HIGH (ref 81–99)
MONOCYTES # BLD AUTO: 1.12 K/UL — HIGH (ref 0.1–0.6)
MONOCYTES NFR BLD AUTO: 16.7 % — HIGH (ref 1.7–9.3)
NEUTROPHILS # BLD AUTO: 3.74 K/UL — SIGNIFICANT CHANGE UP (ref 1.4–6.5)
NEUTROPHILS NFR BLD AUTO: 55.5 % — SIGNIFICANT CHANGE UP (ref 42.2–75.2)
PHOSPHATE SERPL-MCNC: 4.1 MG/DL — SIGNIFICANT CHANGE UP (ref 2.1–4.9)
PLATELET # BLD AUTO: 228 K/UL — SIGNIFICANT CHANGE UP (ref 130–400)
PMV BLD: 9.6 FL — SIGNIFICANT CHANGE UP (ref 7.4–10.4)
POTASSIUM SERPL-MCNC: 3.9 MMOL/L — SIGNIFICANT CHANGE UP (ref 3.5–5)
POTASSIUM SERPL-SCNC: 3.9 MMOL/L — SIGNIFICANT CHANGE UP (ref 3.5–5)
PROT SERPL-MCNC: 5.9 G/DL — LOW (ref 6–8)
RBC # BLD: 3.36 M/UL — LOW (ref 4.2–5.4)
RBC # FLD: 14.9 % — HIGH (ref 11.5–14.5)
S PNEUM AG UR QL: NEGATIVE — SIGNIFICANT CHANGE UP
SODIUM SERPL-SCNC: 133 MMOL/L — LOW (ref 135–146)
WBC # BLD: 6.73 K/UL — SIGNIFICANT CHANGE UP (ref 4.8–10.8)
WBC # FLD AUTO: 6.73 K/UL — SIGNIFICANT CHANGE UP (ref 4.8–10.8)

## 2024-07-23 PROCEDURE — 99233 SBSQ HOSP IP/OBS HIGH 50: CPT

## 2024-07-23 PROCEDURE — 93306 TTE W/DOPPLER COMPLETE: CPT | Mod: 26

## 2024-07-23 RX ORDER — FUROSEMIDE 10 MG/ML
40 INJECTION, SOLUTION INTRAVENOUS
Refills: 0 | Status: DISCONTINUED | OUTPATIENT
Start: 2024-07-23 | End: 2024-07-23

## 2024-07-23 RX ORDER — METOPROLOL TARTRATE 100 MG
100 TABLET ORAL DAILY
Refills: 0 | Status: DISCONTINUED | OUTPATIENT
Start: 2024-07-23 | End: 2024-08-01

## 2024-07-23 RX ORDER — FUROSEMIDE 10 MG/ML
40 INJECTION, SOLUTION INTRAVENOUS
Refills: 0 | Status: DISCONTINUED | OUTPATIENT
Start: 2024-07-23 | End: 2024-07-26

## 2024-07-23 RX ADMIN — Medication 1 MILLIGRAM(S): at 11:17

## 2024-07-23 RX ADMIN — APIXABAN 2.5 MILLIGRAM(S): 5 TABLET, FILM COATED ORAL at 05:09

## 2024-07-23 RX ADMIN — TIMOLOL MALEATE 1 DROP(S): 2.5 SOLUTION/ DROPS OPHTHALMIC at 05:10

## 2024-07-23 RX ADMIN — SERTRALINE HYDROCHLORIDE 25 MILLIGRAM(S): 100 TABLET, FILM COATED ORAL at 11:17

## 2024-07-23 RX ADMIN — AMLODIPINE BESYLATE 5 MILLIGRAM(S): 2.5 TABLET ORAL at 05:09

## 2024-07-23 RX ADMIN — Medication 50 MILLIGRAM(S): at 05:09

## 2024-07-23 RX ADMIN — CHLORHEXIDINE GLUCONATE 1 APPLICATION(S): 500 CLOTH TOPICAL at 15:46

## 2024-07-23 RX ADMIN — SACUBITRIL AND VALSARTAN 1 TABLET(S): 49; 51 TABLET, FILM COATED ORAL at 05:09

## 2024-07-23 RX ADMIN — PANTOPRAZOLE SODIUM 40 MILLIGRAM(S): 20 TABLET, DELAYED RELEASE ORAL at 05:09

## 2024-07-23 RX ADMIN — ATORVASTATIN CALCIUM 20 MILLIGRAM(S): 40 TABLET, FILM COATED ORAL at 21:33

## 2024-07-23 RX ADMIN — APIXABAN 2.5 MILLIGRAM(S): 5 TABLET, FILM COATED ORAL at 17:25

## 2024-07-23 RX ADMIN — Medication 325 MILLIGRAM(S): at 21:33

## 2024-07-23 NOTE — PROGRESS NOTE ADULT - SUBJECTIVE AND OBJECTIVE BOX
SUBJECTIVE/OVERNIGHT EVENTS  Today is hospital day 3d. This morning patient was seen and examined at bedside, resting comfortably in bed. No acute or major events overnight. Pt states that she feels "ok" but denies any specific complaints    CODE STATUS: DNR/DNI    FAMILY COMMUNICATION  Contact date:  Name of person contacted:  Relationship to patient:  Communication details:    MEDICATIONS  STANDING MEDICATIONS  amLODIPine   Tablet 5 milliGRAM(s) Oral daily  apixaban 2.5 milliGRAM(s) Oral two times a day  atorvastatin 20 milliGRAM(s) Oral at bedtime  chlorhexidine 2% Cloths 1 Application(s) Topical daily  folic acid 1 milliGRAM(s) Oral daily  levoFLOXacin IVPB 500 milliGRAM(s) IV Intermittent every 48 hours  metoprolol succinate  milliGRAM(s) Oral daily  pantoprazole    Tablet 40 milliGRAM(s) Oral before breakfast  sacubitril 24 mG/valsartan 26 mG 1 Tablet(s) Oral <User Schedule>  sertraline 25 milliGRAM(s) Oral daily  timolol 0.5% Solution 1 Drop(s) Both EYES two times a day    PRN MEDICATIONS  acetaminophen     Tablet .. 325 milliGRAM(s) Oral every 4 hours PRN    VITALS  T(F): 98.3 (07-23-24 @ 04:46), Max: 100.1 (07-22-24 @ 20:05)  HR: 72 (07-23-24 @ 15:10) (60 - 73)  BP: 163/93 (07-23-24 @ 15:10) (128/70 - 163/93)  RR: 18 (07-23-24 @ 15:10) (17 - 18)  SpO2: 99% (07-23-24 @ 15:10) (98% - 100%)    PHYSICAL EXAM  GENERAL  (x  ) NAD, lying in bed comfortably     (  ) obtunded     (  ) lethargic     (  ) somnolent    HEAD  ( x ) Atraumatic     (  ) hematoma     (  ) laceration (specify location:       )     NECK  (x  ) Supple     (  ) neck stiffness     (  ) nuchal rigidity     (  )  no JVD     (  ) JVD present ( -- cm)    HEART  Rate -->  (x  ) normal rate    (  ) bradycardic    (  ) tachycardic  Rhythm -->  ( x ) regular    (  ) regularly irregular    (  ) irregularly irregular  Murmurs -->  (  x) normal s1/s2    (  ) systolic murmur    (  ) diastolic murmur    (  ) continuous murmur     (  ) S3 present    (  ) S4 present    LUNGS  ( x )Unlabored respirations     (  ) tachypnea  ( x ) B/L air entry     (  ) decreased breath sounds in:  (location     )    (  ) no adventitious sound     (  x) crackles     (  ) wheezing      (  ) rhonchi      (specify location:       )  (  ) chest wall tenderness (specify location:  L>R       )    ABDOMEN  ( x ) Soft     (  ) tense   |   (x  ) nondistended     (  ) distended   |   (  ) +BS     (  ) hypoactive bowel sounds     (  ) hyperactive bowel sounds  (x  ) nontender     (  ) RUQ tenderness     (  ) RLQ tenderness     (  ) LLQ tenderness     (  ) epigastric tenderness     (  ) diffuse tenderness  (  ) Splenomegaly      (  ) Hepatomegaly      (  ) Jaundice     (  ) ecchymosis     EXTREMITIES  (x  ) Normal     (  ) Rash     (  ) ecchymosis     (  ) varicose veins      (  ) pitting edema     (  ) non-pitting edema   (  ) ulceration     (  ) gangrene:     (location:     )    NERVOUS SYSTEM  ( x ) A&Ox3     (  ) confused     (  ) lethargic  CN II-XII:     (  ) Intact     (  ) focal deficits  (Specify:     )   Upper extremities:     (  ) strength X/5     (  ) focal deficit (specify:    )  Lower extremities:     (  ) strength  X/5    (  ) focal deficit (specify:    )    SKIN  ( x ) No rashes or lesions     (  ) maculopapular rash     (  ) pustules     (  ) vesicles     (  ) ulcer     (  ) ecchymosis     (specify location:     )    (  ) Indwelling Morgan Catheter   Date insterted:    Reason (  ) Critical illness     (  ) urinary retention    (  ) Accurate Ins/Outs Monitoring     (  ) CMO patient    (  ) Central Line  Date inserted:  Location: (  ) Right IJ   (  ) Left IJ   (  ) Right Fem   (  ) Left Fem    (  ) SPC  (  ) pigtail  (  ) PEG tube  (  ) colostomy  (  ) jejunostomy  (  ) U-Dall    LABS             10.5   6.73  )-----------( 228      ( 07-23-24 @ 06:38 )             33.4     133  |  94  |  43  -------------------------<  72   07-23-24 @ 06:38  3.9  |  23  |  4.0    Ca      8.3     07-23-24 @ 06:38  Phos   4.1     07-23-24 @ 06:38  Mg     1.9     07-23-24 @ 06:38    TPro  5.9  /  Alb  3.0  /  TBili  0.5  /  DBili  x   /  AST  30  /  ALT  14  /  AlkPhos  172  /  GGT  x     07-23-24 @ 06:38      Troponin T, High Sensitivity Result: 94 ng/L (07-22-24 @ 11:18)  Troponin T, High Sensitivity Result: 133 ng/L (07-21-24 @ 02:15)  Troponin T, High Sensitivity Result: 123 ng/L (07-20-24 @ 23:30)    Urinalysis Basic - ( 23 Jul 2024 06:38 )    Color: x / Appearance: x / SG: x / pH: x  Gluc: 72 mg/dL / Ketone: x  / Bili: x / Urobili: x   Blood: x / Protein: x / Nitrite: x   Leuk Esterase: x / RBC: x / WBC x   Sq Epi: x / Non Sq Epi: x / Bacteria: x          IMAGING

## 2024-07-23 NOTE — PROGRESS NOTE ADULT - ASSESSMENT
ASSESSMENT  88-year-old female (J[employer]) PMH HTN, DVT/PE 2018, NICM - HFrEF improved (LVEF 40-45% in 4/23) s/p AICD, RA, ESRD on HD TTS [facility], admitted for possible PNA.    #Suspected PNA  Possibly viral vs lower suspicion atypical/GNR  Tm 100.8 P>90 RR>20 Hypertensive in the ER  WBC 12--> 5  BNP 38k    7/20 BCX NGTD     MRSA PCR Result.: Negative (07-22-24 @ 11:50)    Streptococcus pneumoniae Ag, Ur Result: Negative (07-22-24 @ 11:36)    Procalcitonin: 0.23 (07-20-24 @ 14:32)- unremarkable   < from: Xray Chest 1 View- PORTABLE-Urgent (07.20.24 @ 09:42) >  Bibasilar opacities and effusions.    RECOMMENDATIONS  - Send sputum cx  - D/C IV, change to PO levaquin 500mg post HD on HD days to complete 5 day end 7/24    If any questions, please text or call on [employer] Teams  Please continue to update ID with any pertinent new clinical, laboratory or radiographic findings.

## 2024-07-23 NOTE — PROGRESS NOTE ADULT - ASSESSMENT
Ms. 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    Acute Hypoxemic Hypercapnic Respiratory Failure 2/2 CAP vs. HFrEF Exacerbation  -patient on Eliquis, doubt PE  -was on BiPAP at time of encounter  -pt coughing and SOB on presentation   -CXR: B/L opacities and effusions  Procal 0.23  MRSA neg   Trop downtrending   Plan:  -c/w levofloxacin 500 mg IV q48h. If given on HD day, give after HD  wean off BiPAP as tolerate  incentive spirometry  volume contraction with HD as tolerated  -BCx NGTD:      -f/u results   f/u BCx, sputum Cx, RVP, U strep & legionella  -f/u ID consult recs     ESRD on HD TTS  -HD per nephro    HTN  HFrEF Improved  HO PE/DVT  -c/w Entresto 24 mg qd (unclear why not on bid dosing)  -c/w amlodipine 5 mg qd  -c/w atorvastatin 20 mg qhs  -c/w metoprolol ER 50 mg qd  -c/w Eliquis 2.5 mg bid    RA  -OP f/u    DVT prophylaxis: Eliquis  GI prophylaxis: PPI  Diet: renal  Code status: DNR/DNI   Activity: IAT PT  Dispo: c/w antibx,    Ms. Marrufo is an 88-year-old female ([employer]) PMH HTN, DVT/PE 2018, NICM - HFrEF improved (LVEF 40-45% in 4/23) s/p AICD, RA, ESRD on HD TTS who was brought in from [facility] for SOB, admitted for possible RLL PNA.    Acute Hypoxemic Hypercapnic Respiratory Failure 2/2 CAP vs. HFrEF Exacerbation  -patient on Eliquis, doubt PE  -was on BiPAP at time of encounter  -pt coughing and SOB on presentation   -CXR: B/L opacities and effusions  Procal 0.23  MRSA neg   Trop downtrending   Plan:  -c/w levofloxacin 500 mg IV q48h. If given on HD day, give after HD  wean off BiPAP as tolerate  incentive spirometry  volume contraction with HD as tolerated  -BCx NGTD:      -f/u results   f/u BCx, sputum Cx, RVP, U strep & legionella  -f/u ID consult recs   -will start Lasix 40 mg IV BID after dialysis     ESRD on HD TTS  -HD per nephro    HTN  HFrEF Improved  HO PE/DVT  -c/w Entresto 24 mg qd (unclear why not on bid dosing)  -c/w amlodipine 5 mg qd  -c/w atorvastatin 20 mg qhs  -c/w metoprolol ER 50 mg qd  -c/w Eliquis 2.5 mg bid    RA  -OP f/u    DVT prophylaxis: Eliquis  GI prophylaxis: PPI  Diet: renal  Code status: DNR/DNI   Activity: IAT PT  Dispo: c/w antibx,

## 2024-07-23 NOTE — PROGRESS NOTE ADULT - SUBJECTIVE AND OBJECTIVE BOX
RACHEL SUMMERS  88y, Female  Allergy: cephalosporins (Angioedema)  Keflex (Swelling)      LOS  3d    CHIEF COMPLAINT: Pneumonia (23 Jul 2024 08:21)      INTERVAL EVENTS/HPI  - No acute events overnight, still having some cough   - T(F): , Max: 100.1 (07-22-24 @ 20:05)  - Denies any worsening symptoms  - Tolerating medication  - WBC Count: 6.73 (07-23-24 @ 06:38)  WBC Count: 6.47 (07-22-24 @ 11:18)     - Creatinine: 4.0 (07-23-24 @ 06:38)  Creatinine: 3.5 (07-22-24 @ 05:33)       ROS  General: Denies rigors, nightsweats  HEENT: Denies headache, rhinorrhea, sore throat, eye pain  CV: Denies CP, palpitations  PULM: Denies wheezing, hemoptysis  GI: Denies hematemesis, hematochezia, melena  : Denies discharge, hematuria  MSK: Denies arthralgias, myalgias  SKIN: Denies rash, lesions  NEURO: Denies paresthesias, weakness  PSYCH: Denies depression, anxiety    VITALS:  T(F): 98.3, Max: 100.1 (07-22-24 @ 20:05)  HR: 60  BP: 128/70  RR: 18Vital Signs Last 24 Hrs  T(C): 36.8 (23 Jul 2024 04:46), Max: 37.8 (22 Jul 2024 20:05)  T(F): 98.3 (23 Jul 2024 04:46), Max: 100.1 (22 Jul 2024 20:05)  HR: 60 (23 Jul 2024 04:46) (60 - 73)  BP: 128/70 (23 Jul 2024 04:46) (128/70 - 162/85)  BP(mean): --  RR: 18 (23 Jul 2024 08:37) (17 - 18)  SpO2: 100% (23 Jul 2024 08:37) (98% - 100%)    Parameters below as of 23 Jul 2024 08:37  Patient On (Oxygen Delivery Method): nasal cannula  O2 Flow (L/min): 2      PHYSICAL EXAM:  Gen: NAD, resting in bed  HEENT: Normocephalic, atraumatic  Neck: supple, no lymphadenopathy  CV: Regular rate & regular rhythm  Lungs: decreased BS at bases, no fremitus  Abdomen: Soft, BS present  Ext: Warm, well perfused  Neuro: non focal, awake  Skin: no rash, no erythema  Lines: no phlebitis TDC    FH: Non-contributory  Social Hx: Non-contributory    TESTS & MEASUREMENTS:                        10.5   6.73  )-----------( 228      ( 23 Jul 2024 06:38 )             33.4     07-23    133<L>  |  94<L>  |  43<H>  ----------------------------<  72  3.9   |  23  |  4.0<H>    Ca    8.3<L>      23 Jul 2024 06:38  Phos  4.1     07-23  Mg     1.9     07-23    TPro  5.9<L>  /  Alb  3.0<L>  /  TBili  0.5  /  DBili  x   /  AST  30  /  ALT  14  /  AlkPhos  172<H>  07-23      LIVER FUNCTIONS - ( 23 Jul 2024 06:38 )  Alb: 3.0 g/dL / Pro: 5.9 g/dL / ALK PHOS: 172 U/L / ALT: 14 U/L / AST: 30 U/L / GGT: x           Urinalysis Basic - ( 23 Jul 2024 06:38 )    Color: x / Appearance: x / SG: x / pH: x  Gluc: 72 mg/dL / Ketone: x  / Bili: x / Urobili: x   Blood: x / Protein: x / Nitrite: x   Leuk Esterase: x / RBC: x / WBC x   Sq Epi: x / Non Sq Epi: x / Bacteria: x        Culture - Blood (collected 07-20-24 @ 11:31)  Source: .Blood Blood  Preliminary Report (07-22-24 @ 22:02):    No growth at 48 Hours    Culture - Blood (collected 07-20-24 @ 11:31)  Source: .Blood Blood  Preliminary Report (07-22-24 @ 22:01):    No growth at 48 Hours        Blood Gas Venous - Lactate: 1.5 mmol/L (07-20-24 @ 09:29)      INFECTIOUS DISEASES TESTING  MRSA PCR Result.: Negative (07-22-24 @ 11:50)  Streptococcus pneumoniae Ag, Ur Result: Negative (07-22-24 @ 11:36)  Procalcitonin: 0.23 (07-20-24 @ 14:32)  Rapid RVP Result: NotDetec (04-08-24 @ 00:15)  Hepatitis C Virus Interpretation Result: Nonreact (04-01-24 @ 15:52)  Procalcitonin, Serum: 0.70 (03-30-24 @ 09:03)  Rapid RVP Result: NotDetec (03-27-24 @ 12:02)  Procalcitonin, Serum: 0.13 (03-23-24 @ 16:00)      INFLAMMATORY MARKERS      RADIOLOGY & ADDITIONAL TESTS:  I have personally reviewed the last available Chest xray  CXR      CT      CARDIOLOGY TESTING  12 Lead ECG:   Ventricular Rate 82 BPM    Atrial Rate 82 BPM    P-R Interval 148 ms    QRS Duration 134 ms    Q-T Interval 470 ms    QTC Calculation(Bazett) 549 ms    P Axis 0 degrees    R Axis -21 degrees    T Axis 161 degrees    Diagnosis Line Normal sinus rhythm  Non-specific intra-ventricular conduction block  Inferior infarct , age undetermined  Possible Anterolateral infarct , age undetermined  Abnormal ECG    Confirmed by Karina Hood ([id number]) on 7/21/2024 10:02:32 AM (07-21-24 @ 02:52)  12 Lead ECG:   Ventricular Rate 92 BPM    Atrial Rate 92 BPM    P-R Interval 154 ms    QRS Duration 142 ms    Q-T Interval 398 ms    QTC Calculation(Bazett) 492 ms    P Axis 51 degrees    R Axis -18 degrees    T Axis 140 degrees    Diagnosis Line Sinus rhythm with Premature atrial complexes  Left bundle branch block  Abnormal ECG  When compared with ECG of 08-APR-2024 02:18,  Premature atrial complexes are now Present  Confirmed by Karina Hood ([id number]) on 7/20/2024 2:07:16 PM (07-20-24 @ 09:03)      MEDICATIONS  amLODIPine   Tablet 5 Oral daily  apixaban 2.5 Oral two times a day  atorvastatin 20 Oral at bedtime  chlorhexidine 2% Cloths 1 Topical daily  folic acid 1 Oral daily  levoFLOXacin IVPB 500 IV Intermittent every 48 hours  metoprolol succinate  Oral daily  pantoprazole    Tablet 40 Oral before breakfast  sacubitril 24 mG/valsartan 26 mG 1 Oral <User Schedule>  sertraline 25 Oral daily  timolol 0.5% Solution 1 Both EYES two times a day      WEIGHT  Weight (kg): 56.7 (07-20-24 @ 08:59)  Creatinine: 4.0 mg/dL (07-23-24 @ 06:38)      ANTIBIOTICS:  levoFLOXacin IVPB 500 milliGRAM(s) IV Intermittent every 48 hours      All available historical records have been reviewed

## 2024-07-23 NOTE — PROGRESS NOTE ADULT - ASSESSMENT
88-year-old female (Quaker) PMH HTN, DVT/PE 2018, NICM - HFrEF improved (LVEF 40-45% in 4/23) s/p AICD, RA, ESRD on HD TTS [facility], admitted for possible RLL PNA.    A/P:   Acute Hypoxemic Hypercapnic Respiratory Failure:   Acute HFrEF   Pneumonia likely gram negative:   Non ischemic Cardiomyopathy: s/p AICD  Patient with SOB, cough,  CXR showed bilateral lower lung opacities and effusion.   Started on Lasix 40mg IV BID (still making a little urine).   Continue Entresto and Metoprolol.   Patient with cough, leukocytosis, could be pneumonia, Nasal MRSA negative  ID recommended Levaquin 500mg post HD for 5 days, end on 7/24.   s/p BiPAP, incentive spirometry  Cardiology consult. Pending echo.     ESRD on HD TTS  Continue HD.     HTN  Continue Entresto, Amlodipine and Metoprolol.     history PE/DVT  RA    DVT prophylaxis: Eliquis  GI prophylaxis: PPI    Code status: DNR/DNI   #Progress Note Handoff:  Pending (specify):  cardiology follow up, improving SOB  Family discussion: with her son at bedside.   Disposition: from SNF

## 2024-07-23 NOTE — PROGRESS NOTE ADULT - SUBJECTIVE AND OBJECTIVE BOX
seen and examined   24 h events noted   no distress         PAST HISTORY  --------------------------------------------------------------------------------  No significant changes to PMH, PSH, FHx, SHx, unless otherwise noted    ALLERGIES & MEDICATIONS  --------------------------------------------------------------------------------  Allergies    cephalosporins (Angioedema)  Keflex (Swelling)    Intolerances      Standing Inpatient Medications  amLODIPine   Tablet 5 milliGRAM(s) Oral daily  apixaban 2.5 milliGRAM(s) Oral two times a day  atorvastatin 20 milliGRAM(s) Oral at bedtime  chlorhexidine 2% Cloths 1 Application(s) Topical daily  folic acid 1 milliGRAM(s) Oral daily  levoFLOXacin IVPB 500 milliGRAM(s) IV Intermittent every 48 hours  metoprolol succinate ER 50 milliGRAM(s) Oral daily  pantoprazole    Tablet 40 milliGRAM(s) Oral before breakfast  sacubitril 24 mG/valsartan 26 mG 1 Tablet(s) Oral <User Schedule>  sertraline 25 milliGRAM(s) Oral daily  timolol 0.5% Solution 1 Drop(s) Both EYES two times a day    PRN Inpatient Medications  acetaminophen     Tablet .. 325 milliGRAM(s) Oral every 4 hours PRN        VITALS/PHYSICAL EXAM  --------------------------------------------------------------------------------  T(C): 36.8 (07-23-24 @ 04:46), Max: 37.8 (07-22-24 @ 20:05)  HR: 60 (07-23-24 @ 04:46) (60 - 73)  BP: 128/70 (07-23-24 @ 04:46) (128/70 - 162/85)  RR: 18 (07-23-24 @ 04:46) (17 - 18)  SpO2: 98% (07-23-24 @ 04:46) (98% - 98%)  Wt(kg): --      07-22-24 @ 07:01  -  07-23-24 @ 07:00  --------------------------------------------------------  IN: 997 mL / OUT: 600 mL / NET: 397 mL      Physical Exam:  	Gen: NAD  	Pulm: decrease BS  B/L  	CV: S1S2; no rub  	Abd: +distended  	LE:  no edema  	Vascular access:tdc     LABS/STUDIES  --------------------------------------------------------------------------------              10.7   6.47  >-----------<  228      [07-22-24 @ 11:18]              34.3     135  |  98  |  38  ----------------------------<  86      [07-22-24 @ 05:33]  3.7   |  26  |  3.5        Ca     8.6     [07-22-24 @ 05:33]      Mg     2.0     [07-22-24 @ 05:33]      Phos  4.0     [07-22-24 @ 05:33]    TPro  5.7  /  Alb  3.0  /  TBili  0.3  /  DBili  x   /  AST  24  /  ALT  12  /  AlkPhos  165  [07-22-24 @ 05:33]    Creatinine Trend:  SCr 3.5 [07-22 @ 05:33]  SCr 3.1 [07-20 @ 09:40]    Urinalysis - [07-22-24 @ 11:36]      Color Yellow / Appearance Clear / SG 1.018 / pH 7.0      Gluc Negative / Ketone Negative  / Bili Negative / Urobili 1.0       Blood Negative / Protein 300 / Leuk Est Trace / Nitrite Negative      RBC 3 / WBC 13 / Hyaline  / Gran  / Sq Epi  / Non Sq Epi 6 / Bacteria Negative      Iron 33, TIBC 147, %sat 22      [04-04-24 @ 06:44]  Ferritin 785      [04-04-24 @ 06:44]  PTH -- (Ca 8.2)      [04-09-24 @ 05:43]   35  PTH -- (Ca 8.2)      [03-24-24 @ 20:00]   138  Vitamin D (25OH) 11      [04-09-24 @ 05:43]  Lipid: chol 144, , HDL 39, LDL --      [03-22-24 @ 06:14]

## 2024-07-23 NOTE — PROGRESS NOTE ADULT - ASSESSMENT
88-year-old female (Samaritan) PMH HTN, DVT/PE 2018, NICM - HFrEF improved (LVEF 40-45% in 4/23) s/p AICD, RA, ESRD on HD TTS who was brought in from [facility] for SOB.  # ESRD on HD T TH Sat sp HD saturday   # SOB volume overload? CHF   -for HD today 3 k  bath uf 2 liters as tolerated   - IP at target  no binders   - h/h noted / no need for ELIJAH   - bp controlled   - on levaquin followed by ID   will follow

## 2024-07-23 NOTE — PROGRESS NOTE ADULT - NS ATTEST RISK PROBLEM GEN_ALL_CORE FT
I have personally seen and examined this patient.  I have reviewed all pertinent clinical information and reviewed all relevant imaging and diagnostic studies personally.   If possible, I counseled the patient about diagnostic testing and treatment plan.   I discussed my recommendations with the primary team and any family members at bedside.   - I assisted with prescription drug management (i.e discharge antibiotics)

## 2024-07-23 NOTE — PROGRESS NOTE ADULT - SUBJECTIVE AND OBJECTIVE BOX
RACHEL SUMMERS  88y  Female      Patient is a 88y old  Female who presents with a chief complaint of Pneumonia (23 Jul 2024 12:23)      INTERVAL HPI/OVERNIGHT EVENTS:  She feels ok, SOB is better.   Vital Signs Last 24 Hrs  T(C): 36.8 (23 Jul 2024 04:46), Max: 37.8 (22 Jul 2024 20:05)  T(F): 98.3 (23 Jul 2024 04:46), Max: 100.1 (22 Jul 2024 20:05)  HR: 67 (23 Jul 2024 12:00) (60 - 73)  BP: 146/67 (23 Jul 2024 12:00) (128/70 - 162/85)  BP(mean): --  RR: 18 (23 Jul 2024 12:00) (17 - 18)  SpO2: 99% (23 Jul 2024 12:00) (98% - 100%)    Parameters below as of 23 Jul 2024 12:00  Patient On (Oxygen Delivery Method): nasal cannula  O2 Flow (L/min): 2        07-22-24 @ 07:01  -  07-23-24 @ 07:00  --------------------------------------------------------  IN: 997 mL / OUT: 600 mL / NET: 397 mL    07-23-24 @ 07:01  -  07-23-24 @ 13:03  --------------------------------------------------------  IN: 236 mL / OUT: 0 mL / NET: 236 mL            Consultant(s) Notes Reviewed:  [x ] YES  [ ] NO          MEDICATIONS  (STANDING):  amLODIPine   Tablet 5 milliGRAM(s) Oral daily  apixaban 2.5 milliGRAM(s) Oral two times a day  atorvastatin 20 milliGRAM(s) Oral at bedtime  chlorhexidine 2% Cloths 1 Application(s) Topical daily  folic acid 1 milliGRAM(s) Oral daily  levoFLOXacin IVPB 500 milliGRAM(s) IV Intermittent every 48 hours  metoprolol succinate  milliGRAM(s) Oral daily  pantoprazole    Tablet 40 milliGRAM(s) Oral before breakfast  sacubitril 24 mG/valsartan 26 mG 1 Tablet(s) Oral <User Schedule>  sertraline 25 milliGRAM(s) Oral daily  timolol 0.5% Solution 1 Drop(s) Both EYES two times a day    MEDICATIONS  (PRN):  acetaminophen     Tablet .. 325 milliGRAM(s) Oral every 4 hours PRN Mild Pain (1 - 3), Moderate Pain (4 - 6)      LABS                          10.5   6.73  )-----------( 228      ( 23 Jul 2024 06:38 )             33.4     07-23    133<L>  |  94<L>  |  43<H>  ----------------------------<  72  3.9   |  23  |  4.0<H>    Ca    8.3<L>      23 Jul 2024 06:38  Phos  4.1     07-23  Mg     1.9     07-23    TPro  5.9<L>  /  Alb  3.0<L>  /  TBili  0.5  /  DBili  x   /  AST  30  /  ALT  14  /  AlkPhos  172<H>  07-23      Urinalysis Basic - ( 23 Jul 2024 06:38 )    Color: x / Appearance: x / SG: x / pH: x  Gluc: 72 mg/dL / Ketone: x  / Bili: x / Urobili: x   Blood: x / Protein: x / Nitrite: x   Leuk Esterase: x / RBC: x / WBC x   Sq Epi: x / Non Sq Epi: x / Bacteria: x        Lactate Trend        CAPILLARY BLOOD GLUCOSE          Culture - Blood (collected 07-20-24 @ 11:31)  Source: .Blood Blood  Preliminary Report (07-22-24 @ 22:02):    No growth at 48 Hours    Culture - Blood (collected 07-20-24 @ 11:31)  Source: .Blood Blood  Preliminary Report (07-22-24 @ 22:01):    No growth at 48 Hours        RADIOLOGY & ADDITIONAL TESTS:    Imaging Personally Reviewed:  [ ] YES  [ ] NO    HEALTH ISSUES - PROBLEM Dx:          PHYSICAL EXAM:  GENERAL: NAD, well-developed.  HEAD:  Atraumatic, Normocephalic.  EYES: EOMI, PERRLA, conjunctiva and sclera clear.  NECK: Supple, increased JVP  CHEST/LUNG: bilateral lower lung rales worse on the left.   HEART: Regular rate and rhythm; S1 S2.   ABDOMEN: Soft, Nontender, Nondistended; Bowel sounds present.  EXTREMITIES:  2+ Peripheral Pulses, No clubbing, cyanosis, or edema.  PSYCH: AAOx3.  NEUROLOGY: non-focal.  SKIN: No rashes or lesions.

## 2024-07-24 LAB
ALBUMIN SERPL ELPH-MCNC: 2.8 G/DL — LOW (ref 3.5–5.2)
ALP SERPL-CCNC: 153 U/L — HIGH (ref 30–115)
ALT FLD-CCNC: 14 U/L — SIGNIFICANT CHANGE UP (ref 0–41)
ANION GAP SERPL CALC-SCNC: 13 MMOL/L — SIGNIFICANT CHANGE UP (ref 7–14)
AST SERPL-CCNC: 28 U/L — SIGNIFICANT CHANGE UP (ref 0–41)
BASOPHILS # BLD AUTO: 0.03 K/UL — SIGNIFICANT CHANGE UP (ref 0–0.2)
BASOPHILS NFR BLD AUTO: 0.5 % — SIGNIFICANT CHANGE UP (ref 0–1)
BILIRUB SERPL-MCNC: 0.3 MG/DL — SIGNIFICANT CHANGE UP (ref 0.2–1.2)
BUN SERPL-MCNC: 23 MG/DL — HIGH (ref 10–20)
CALCIUM SERPL-MCNC: 8.2 MG/DL — LOW (ref 8.4–10.4)
CHLORIDE SERPL-SCNC: 96 MMOL/L — LOW (ref 98–110)
CO2 SERPL-SCNC: 25 MMOL/L — SIGNIFICANT CHANGE UP (ref 17–32)
CREAT SERPL-MCNC: 2.8 MG/DL — HIGH (ref 0.7–1.5)
EGFR: 16 ML/MIN/1.73M2 — LOW
EOSINOPHIL # BLD AUTO: 0.49 K/UL — SIGNIFICANT CHANGE UP (ref 0–0.7)
EOSINOPHIL NFR BLD AUTO: 8.8 % — HIGH (ref 0–8)
GLUCOSE SERPL-MCNC: 84 MG/DL — SIGNIFICANT CHANGE UP (ref 70–99)
HCT VFR BLD CALC: 31.3 % — LOW (ref 37–47)
HGB BLD-MCNC: 9.9 G/DL — LOW (ref 12–16)
HPIV3 RNA SPEC QL NAA+PROBE: DETECTED
IMM GRANULOCYTES NFR BLD AUTO: 0.2 % — SIGNIFICANT CHANGE UP (ref 0.1–0.3)
LYMPHOCYTES # BLD AUTO: 1.75 K/UL — SIGNIFICANT CHANGE UP (ref 1.2–3.4)
LYMPHOCYTES # BLD AUTO: 31.3 % — SIGNIFICANT CHANGE UP (ref 20.5–51.1)
MAGNESIUM SERPL-MCNC: 1.7 MG/DL — LOW (ref 1.8–2.4)
MCHC RBC-ENTMCNC: 31 PG — SIGNIFICANT CHANGE UP (ref 27–31)
MCHC RBC-ENTMCNC: 31.6 G/DL — LOW (ref 32–37)
MCV RBC AUTO: 98.1 FL — SIGNIFICANT CHANGE UP (ref 81–99)
MONOCYTES # BLD AUTO: 1.04 K/UL — HIGH (ref 0.1–0.6)
MONOCYTES NFR BLD AUTO: 18.6 % — HIGH (ref 1.7–9.3)
NEUTROPHILS # BLD AUTO: 2.27 K/UL — SIGNIFICANT CHANGE UP (ref 1.4–6.5)
NEUTROPHILS NFR BLD AUTO: 40.6 % — LOW (ref 42.2–75.2)
NRBC # BLD: 0 /100 WBCS — SIGNIFICANT CHANGE UP (ref 0–0)
PHOSPHATE SERPL-MCNC: 3.4 MG/DL — SIGNIFICANT CHANGE UP (ref 2.1–4.9)
PLATELET # BLD AUTO: 207 K/UL — SIGNIFICANT CHANGE UP (ref 130–400)
PMV BLD: 9.2 FL — SIGNIFICANT CHANGE UP (ref 7.4–10.4)
POTASSIUM SERPL-MCNC: 3.8 MMOL/L — SIGNIFICANT CHANGE UP (ref 3.5–5)
POTASSIUM SERPL-SCNC: 3.8 MMOL/L — SIGNIFICANT CHANGE UP (ref 3.5–5)
PROT SERPL-MCNC: 5.4 G/DL — LOW (ref 6–8)
RAPID RVP RESULT: DETECTED
RBC # BLD: 3.19 M/UL — LOW (ref 4.2–5.4)
RBC # FLD: 14.6 % — HIGH (ref 11.5–14.5)
SARS-COV-2 RNA SPEC QL NAA+PROBE: SIGNIFICANT CHANGE UP
SODIUM SERPL-SCNC: 134 MMOL/L — LOW (ref 135–146)
WBC # BLD: 5.59 K/UL — SIGNIFICANT CHANGE UP (ref 4.8–10.8)
WBC # FLD AUTO: 5.59 K/UL — SIGNIFICANT CHANGE UP (ref 4.8–10.8)

## 2024-07-24 PROCEDURE — 73502 X-RAY EXAM HIP UNI 2-3 VIEWS: CPT | Mod: 26,LT

## 2024-07-24 PROCEDURE — 99233 SBSQ HOSP IP/OBS HIGH 50: CPT

## 2024-07-24 PROCEDURE — 73562 X-RAY EXAM OF KNEE 3: CPT | Mod: 26,LT

## 2024-07-24 PROCEDURE — 99223 1ST HOSP IP/OBS HIGH 75: CPT

## 2024-07-24 RX ORDER — LEVOFLOXACIN 25 MG/ML
500 SOLUTION ORAL
Refills: 0 | Status: DISCONTINUED | OUTPATIENT
Start: 2024-07-25 | End: 2024-07-25

## 2024-07-24 RX ORDER — MAGNESIUM SULFATE 500 MG/ML
2 VIAL (ML) INJECTION
Refills: 0 | Status: COMPLETED | OUTPATIENT
Start: 2024-07-24 | End: 2024-07-24

## 2024-07-24 RX ADMIN — ATORVASTATIN CALCIUM 20 MILLIGRAM(S): 40 TABLET, FILM COATED ORAL at 21:14

## 2024-07-24 RX ADMIN — AMLODIPINE BESYLATE 5 MILLIGRAM(S): 2.5 TABLET ORAL at 05:16

## 2024-07-24 RX ADMIN — TIMOLOL MALEATE 1 DROP(S): 2.5 SOLUTION/ DROPS OPHTHALMIC at 05:17

## 2024-07-24 RX ADMIN — APIXABAN 2.5 MILLIGRAM(S): 5 TABLET, FILM COATED ORAL at 05:16

## 2024-07-24 RX ADMIN — FUROSEMIDE 40 MILLIGRAM(S): 10 INJECTION, SOLUTION INTRAVENOUS at 05:16

## 2024-07-24 RX ADMIN — SACUBITRIL AND VALSARTAN 1 TABLET(S): 49; 51 TABLET, FILM COATED ORAL at 06:16

## 2024-07-24 RX ADMIN — SERTRALINE HYDROCHLORIDE 25 MILLIGRAM(S): 100 TABLET, FILM COATED ORAL at 11:49

## 2024-07-24 RX ADMIN — FUROSEMIDE 40 MILLIGRAM(S): 10 INJECTION, SOLUTION INTRAVENOUS at 13:56

## 2024-07-24 RX ADMIN — PANTOPRAZOLE SODIUM 40 MILLIGRAM(S): 20 TABLET, DELAYED RELEASE ORAL at 05:17

## 2024-07-24 RX ADMIN — CHLORHEXIDINE GLUCONATE 1 APPLICATION(S): 500 CLOTH TOPICAL at 11:50

## 2024-07-24 RX ADMIN — Medication 150 GRAM(S): at 11:50

## 2024-07-24 RX ADMIN — Medication 325 MILLIGRAM(S): at 11:49

## 2024-07-24 RX ADMIN — APIXABAN 2.5 MILLIGRAM(S): 5 TABLET, FILM COATED ORAL at 17:57

## 2024-07-24 RX ADMIN — Medication 1 MILLIGRAM(S): at 11:50

## 2024-07-24 RX ADMIN — Medication 150 GRAM(S): at 13:56

## 2024-07-24 RX ADMIN — TIMOLOL MALEATE 1 DROP(S): 2.5 SOLUTION/ DROPS OPHTHALMIC at 17:58

## 2024-07-24 RX ADMIN — Medication 100 MILLIGRAM(S): at 05:16

## 2024-07-24 RX ADMIN — Medication 325 MILLIGRAM(S): at 05:16

## 2024-07-24 RX ADMIN — Medication 325 MILLIGRAM(S): at 12:19

## 2024-07-24 NOTE — PROGRESS NOTE ADULT - ASSESSMENT
ASSESSMENT  88-year-old female (Gnosticist) PMH HTN, DVT/PE 2018, NICM - HFrEF improved (LVEF 40-45% in 4/23) s/p AICD, RA, ESRD on HD TTS who was brought in from [facility] for SOB, admitted for possible PNA.    #Suspected PNA  Possibly viral vs lower suspicion atypical/GNR  Tm 100.8 P>90 RR>20 Hypertensive in the ER  WBC 12--> 5  BNP 38k    7/20 BCX NGTD     MRSA PCR Result.: Negative (07-22-24 @ 11:50)    Streptococcus pneumoniae Ag, Ur Result: Negative (07-22-24 @ 11:36)    Legionella Antigen, Urine: Negative (07-22-24 @ 11:36)    Procalcitonin: 0.23 (07-20-24 @ 14:32)- unremarkable   < from: Xray Chest 1 View- PORTABLE-Urgent (07.20.24 @ 09:42) >  Bibasilar opacities and effusions.    RECOMMENDATIONS  - Fever, send repeat BCX  - Trend fever curve   - Send sputum cx  - D/C IV, change to PO levaquin 500mg post HD on HD days to complete 7 day end 7/26    If any questions, please text or call on [employer] Teams  Please continue to update ID with any pertinent new clinical, laboratory or radiographic findings.     ASSESSMENT  88-year-old female (Sabianism) PMH HTN, DVT/PE 2018, NICM - HFrEF improved (LVEF 40-45% in 4/23) s/p AICD, RA, ESRD on HD TTS who was brought in from [facility] for SOB, admitted for possible PNA.    #Suspected PNA  Rule out RA flare  Possibly viral vs lower suspicion atypical/GNR  Tm 100.8 P>90 RR>20 Hypertensive in the ER  WBC 12--> 5  BNP 38k    7/20 BCX NGTD     MRSA PCR Result.: Negative (07-22-24 @ 11:50)    Streptococcus pneumoniae Ag, Ur Result: Negative (07-22-24 @ 11:36)    Legionella Antigen, Urine: Negative (07-22-24 @ 11:36)    Procalcitonin: 0.23 (07-20-24 @ 14:32)- unremarkable   < from: Xray Chest 1 View- PORTABLE-Urgent (07.20.24 @ 09:42) >  Bibasilar opacities and effusions.    RECOMMENDATIONS  - Fever, send repeat BCX  - Rule out RA flare, pain L hip and L knee. L knee is warm and tender. Send ESR/CRP, RF, Uric acid. Consider steroids   - Trend fever curve   - Send sputum cx  - D/C IV, change to PO levaquin 500mg post HD on HD days to complete 7 day end 7/26    If any questions, please text or call on [employer] Teams  Please continue to update ID with any pertinent new clinical, laboratory or radiographic findings.

## 2024-07-24 NOTE — PROGRESS NOTE ADULT - SUBJECTIVE AND OBJECTIVE BOX
seen and examined  24 he vents noted   no distress         PAST HISTORY  --------------------------------------------------------------------------------  No significant changes to PMH, PSH, FHx, SHx, unless otherwise noted    ALLERGIES & MEDICATIONS  --------------------------------------------------------------------------------  Allergies    cephalosporins (Angioedema)  Keflex (Swelling)    Intolerances      Standing Inpatient Medications  amLODIPine   Tablet 5 milliGRAM(s) Oral daily  apixaban 2.5 milliGRAM(s) Oral two times a day  atorvastatin 20 milliGRAM(s) Oral at bedtime  chlorhexidine 2% Cloths 1 Application(s) Topical daily  folic acid 1 milliGRAM(s) Oral daily  furosemide   Injectable 40 milliGRAM(s) IV Push two times a day  levoFLOXacin IVPB 500 milliGRAM(s) IV Intermittent every 48 hours  metoprolol succinate  milliGRAM(s) Oral daily  pantoprazole    Tablet 40 milliGRAM(s) Oral before breakfast  sacubitril 24 mG/valsartan 26 mG 1 Tablet(s) Oral <User Schedule>  sertraline 25 milliGRAM(s) Oral daily  timolol 0.5% Solution 1 Drop(s) Both EYES two times a day    PRN Inpatient Medications  acetaminophen     Tablet .. 325 milliGRAM(s) Oral every 4 hours PRN          VITALS/PHYSICAL EXAM  --------------------------------------------------------------------------------  T(C): 37.3 (07-24-24 @ 06:10), Max: 38.2 (07-23-24 @ 20:06)  HR: 73 (07-24-24 @ 05:03) (67 - 76)  BP: 135/81 (07-24-24 @ 05:03) (135/81 - 163/93)  RR: 18 (07-24-24 @ 05:03) (18 - 18)  SpO2: 95% (07-23-24 @ 21:13) (95% - 99%)  Wt(kg): --        07-23-24 @ 07:01  -  07-24-24 @ 07:00  --------------------------------------------------------  IN: 436 mL / OUT: 2300 mL / NET: -1864 mL      Physical Exam:  	Gen: NAD  	Pulm: decrease BS  B/L  	CV: S1S2; no rub  	Abd: +distended  	LE:  no edema  	Vascular access:    LABS/STUDIES  --------------------------------------------------------------------------------              9.9    5.59  >-----------<  207      [07-24-24 @ 06:48]              31.3     134  |  96  |  23  ----------------------------<  84      [07-24-24 @ 06:48]  3.8   |  25  |  2.8        Ca     8.2     [07-24-24 @ 06:48]      Mg     1.7     [07-24-24 @ 06:48]      Phos  3.4     [07-24-24 @ 06:48]    TPro  5.4  /  Alb  2.8  /  TBili  0.3  /  DBili  x   /  AST  28  /  ALT  14  /  AlkPhos  153  [07-24-24 @ 06:48]          Creatinine Trend:  SCr 2.8 [07-24 @ 06:48]  SCr 4.0 [07-23 @ 06:38]  SCr 3.5 [07-22 @ 05:33]  SCr 3.1 [07-20 @ 09:40]    Urinalysis - [07-24-24 @ 06:48]      Color  / Appearance  / SG  / pH       Gluc 84 / Ketone   / Bili  / Urobili        Blood  / Protein  / Leuk Est  / Nitrite       RBC  / WBC  / Hyaline  / Gran  / Sq Epi  / Non Sq Epi  / Bacteria       Iron 33, TIBC 147, %sat 22      [04-04-24 @ 06:44]  Ferritin 785      [04-04-24 @ 06:44]  PTH -- (Ca 8.2)      [04-09-24 @ 05:43]   35  PTH -- (Ca 8.2)      [03-24-24 @ 20:00]   138  Vitamin D (25OH) 11      [04-09-24 @ 05:43]  Lipid: chol 144, , HDL 39, LDL --      [03-22-24 @ 06:14]

## 2024-07-24 NOTE — PROGRESS NOTE ADULT - SUBJECTIVE AND OBJECTIVE BOX
SUBJECTIVE/OVERNIGHT EVENTS  Today is hospital day 4d. This morning patient was seen and examined at bedside, resting comfortably in bed. No acute or major events overnight. Pt states that she feels "ok" but denies specific complaints.    CODE STATUS: DNR/DNI    FAMILY COMMUNICATION  Contact date:  Name of person contacted:  Relationship to patient:  Communication details:    MEDICATIONS  STANDING MEDICATIONS  amLODIPine   Tablet 5 milliGRAM(s) Oral daily  apixaban 2.5 milliGRAM(s) Oral two times a day  atorvastatin 20 milliGRAM(s) Oral at bedtime  chlorhexidine 2% Cloths 1 Application(s) Topical daily  folic acid 1 milliGRAM(s) Oral daily  furosemide   Injectable 40 milliGRAM(s) IV Push two times a day  levoFLOXacin IVPB 500 milliGRAM(s) IV Intermittent every 48 hours  metoprolol succinate  milliGRAM(s) Oral daily  pantoprazole    Tablet 40 milliGRAM(s) Oral before breakfast  sacubitril 24 mG/valsartan 26 mG 1 Tablet(s) Oral <User Schedule>  sertraline 25 milliGRAM(s) Oral daily  timolol 0.5% Solution 1 Drop(s) Both EYES two times a day    PRN MEDICATIONS  acetaminophen     Tablet .. 325 milliGRAM(s) Oral every 4 hours PRN    VITALS  T(F): 99.1 (07-24-24 @ 06:10), Max: 100.7 (07-23-24 @ 20:06)  HR: 73 (07-24-24 @ 05:03) (67 - 76)  BP: 135/81 (07-24-24 @ 05:03) (135/81 - 163/93)  RR: 18 (07-24-24 @ 05:03) (18 - 18)  SpO2: 95% (07-23-24 @ 21:13) (95% - 99%)    PHYSICAL EXAM  GENERAL  (x  ) NAD, lying in bed comfortably     (  ) obtunded     (  ) lethargic     (  ) somnolent    HEAD  ( x ) Atraumatic     (  ) hematoma     (  ) laceration (specify location:       )     NECK  (x  ) Supple     (  ) neck stiffness     (  ) nuchal rigidity     (  )  no JVD     (  ) JVD present ( -- cm)    HEART  Rate -->  ( x ) normal rate    (  ) bradycardic    (  ) tachycardic  Rhythm -->  (x  ) regular    (  ) regularly irregular    (  ) irregularly irregular  Murmurs -->  (x  ) normal s1/s2    (  ) systolic murmur    (  ) diastolic murmur    (  ) continuous murmur     (  ) S3 present    (  ) S4 present    LUNGS  ( x )Unlabored respirations     (  ) tachypnea  ( x ) B/L air entry     (  ) decreased breath sounds in:  (location     )    (  ) no adventitious sound     ( x ) crackles     ( ) wheezing      (  ) rhonchi      (specify location:       )  (  ) chest wall tenderness (specify location:   B/L    )    ABDOMEN  (x  ) Soft     (  ) tense   |   (x  ) nondistended     (  ) distended   |   (  ) +BS     (  ) hypoactive bowel sounds     (  ) hyperactive bowel sounds  ( x ) nontender     (  ) RUQ tenderness     (  ) RLQ tenderness     (  ) LLQ tenderness     (  ) epigastric tenderness     (  ) diffuse tenderness  (  ) Splenomegaly      (  ) Hepatomegaly      (  ) Jaundice     (  ) ecchymosis     EXTREMITIES  (x  ) Normal     (  ) Rash     (  ) ecchymosis     (  ) varicose veins      (  ) pitting edema     (  ) non-pitting edema   (  ) ulceration     (  ) gangrene:     (location:     )    NERVOUS SYSTEM  ( x ) A&Ox3     (  ) confused     (  ) lethargic  CN II-XII:     (  ) Intact     (  ) focal deficits  (Specify:     )   Upper extremities:     (  ) strength X/5     (  ) focal deficit (specify:    )  Lower extremities:     (  ) strength  X/5    (  ) focal deficit (specify:    )    SKIN  ( x ) No rashes or lesions     (  ) maculopapular rash     (  ) pustules     (  ) vesicles     (  ) ulcer     (  ) ecchymosis     (specify location:     )    (  ) Indwelling Morgan Catheter   Date insterted:    Reason (  ) Critical illness     (  ) urinary retention    (  ) Accurate Ins/Outs Monitoring     (  ) CMO patient    (  ) Central Line  Date inserted:  Location: (  ) Right IJ   (  ) Left IJ   (  ) Right Fem   (  ) Left Fem    (  ) SPC  (  ) pigtail  (  ) PEG tube  (  ) colostomy  (  ) jejunostomy  (  ) U-Dall    LABS             9.9    5.59  )-----------( 207      ( 07-24-24 @ 06:48 )             31.3     134  |  96  |  23  -------------------------<  84   07-24-24 @ 06:48  3.8  |  25  |  2.8    Ca      8.2     07-24-24 @ 06:48  Phos   3.4     07-24-24 @ 06:48  Mg     1.7     07-24-24 @ 06:48    TPro  5.4  /  Alb  2.8  /  TBili  0.3  /  DBili  x   /  AST  28  /  ALT  14  /  AlkPhos  153  /  GGT  x     07-24-24 @ 06:48      Troponin T, High Sensitivity Result: 94 ng/L (07-22-24 @ 11:18)    Urinalysis Basic - ( 24 Jul 2024 06:48 )    Color: x / Appearance: x / SG: x / pH: x  Gluc: 84 mg/dL / Ketone: x  / Bili: x / Urobili: x   Blood: x / Protein: x / Nitrite: x   Leuk Esterase: x / RBC: x / WBC x   Sq Epi: x / Non Sq Epi: x / Bacteria: x          IMAGING

## 2024-07-24 NOTE — PROGRESS NOTE ADULT - ASSESSMENT
Ms. Marrufo is an 88-year-old female (Restorationism) PMH HTN, DVT/PE 2018, NICM - HFrEF improved (LVEF 40-45% in 4/23) s/p AICD, RA, ESRD on HD TTS who was brought in from [facility] for SOB. The patient had been having non-productive cough and pleuritic chest pain since the morning of admission. She was being treated for UTI at the NH with Bactrim. Denied fevers, chills, LOC, N/V/D.    On presentation in the ED she was on 10L NRB satting 98% and was then placed on BiPAP for increased WOB.    Cardiology initially was consulted during this admission for elevated troponin which were attributed to type 2  NSTEMI in light of sepsis and HF. Patient had new echo on 7/23 which now shows newly reduced EF 25-30% with G2DD.     IMPRESSION:     #Acute on chronic hypoxic respiratory failure   #DVT/PE on Eliquis   #Acute exacerbation of HFrEF gene in a setting of infection   #Type II NSTEMI   #NICM - HFrEF s/p AICD  #Episodes of NSVT on tele   #ESRD on HD   #HTN  - EF 7/23: EF 25-30% ( decreased from 40-45%)   - trops stable     RECOMMENDATION:     - Continue with GDMT   - Up titrate Metoprolol succinate to 150 mg from 100 mg as needed  - Outpatient follow up with Cardiology   - Continue with IV lasix 40 mg BID for 1 more day    Ms. Marrufo is an 88-year-old female (Shinto) PMH HTN, DVT/PE 2018, NICM - HFrEF improved (LVEF 40-45% in 4/23) s/p AICD, RA, ESRD on HD TTS who was brought in from [facility] for SOB. The patient had been having non-productive cough and pleuritic chest pain since the morning of admission. She was being treated for UTI at the NH with Bactrim. Denied fevers, chills, LOC, N/V/D.    On presentation in the ED she was on 10L NRB satting 98% and was then placed on BiPAP for increased WOB.    Cardiology initially was consulted during this admission for elevated troponin which were attributed to type 2  NSTEMI in light of sepsis and HF. Patient had new echo on 7/23 which now shows newly reduced EF 25-30% with G2DD.     IMPRESSION:     #Acute on chronic hypoxic respiratory failure   #DVT/PE on Eliquis   #Acute exacerbation of HFrEF gene in a setting of infection   #Type II NSTEMI   #NICM - HFrEF s/p AICD  #Episodes of NSVT on tele   #ESRD on HD   #HTN  - EF 7/23: EF 25-30% ( decreased from 40-45%)   - trops stable; no angina or signs of ADHF    RECOMMENDATION:     - Continue with GDMT   - Up titrate Metoprolol succinate to 150 mg from 100 mg as needed  - Outpatient follow up with Cardiology   - Continue with IV lasix 40 mg BID for 1 more day then transition to PO

## 2024-07-24 NOTE — PROGRESS NOTE ADULT - ASSESSMENT
88-year-old female (Anabaptist) PMH HTN, DVT/PE 2018, NICM - HFrEF improved (LVEF 40-45% in 4/23) s/p AICD, RA, ESRD on HD TTS who was brought in from [facility] for SOB, admitted for possible RLL PNA.    A/P:   Acute Hypoxemic Hypercapnic Respiratory Failure:   Acute HFrEF   Pneumonia likely gram negative:   Non ischemic Cardiomyopathy: s/p AICD  Patient with SOB, cough,  CXR showed bilateral lower lung opacities and effusion.   Started on Lasix 40mg IV BID (still making a little urine).   Continue Entresto and Metoprolol.   Patient with cough, leukocytosis, could be pneumonia, Nasal MRSA negative  ID recommended Levaquin 500mg post HD for 5 days, end on 7/24.   s/p BiPAP, incentive spirometry  Cardiology consult. Pending echo.     ESRD on HD TTS  Continue HD.     HTN  Continue Entresto, Amlodipine and Metoprolol.     history PE/DVT  RA    DVT prophylaxis: Eliquis  GI prophylaxis: PPI    Code status: DNR/DNI   #Progress Note Handoff:  Pending (specify):  cardiology follow up, improving SOB  Family discussion: with her son at bedside.   Disposition: from SNF 89 yo F [facility] PMHxHTN, DVT/PE 2018, NICM chronic with improved (LVEF 40-45% in 4/23) s/p AICD, RA, ESRD on HD TTS who was brought in from [facility] for SOB, admitted for possible RLL PNA.    Acute Hypoxemic Hypercapnic Respiratory Failure:   Acute HFrEF   -possible pneumonia due to parainfluenza virus--supportive care  -Non ischemic Cardiomyopathy: s/p AICD  -Patient with SOB, cough,  -CXR showed bilateral lower lung opacities and effusion.   -Started on Lasix 40mg IV BID (still making a little urine).   -Continue Entresto and Metoprolol.   -Patient with cough, leukocytosis, could be pneumonia, Nasal MRSA negative  -ID recommended Levaquin 500mg post HD for 5 days, ends today on 7/24.   -s/p BiPAP, incentive spirometry  -echo shows reduction in EF to 25%. Cardiology f/u requested    HIp and knee pain  - ID recommended w/u for RA flare  - pt reports she fell on this side 2 weeks ago  - can check CRP/ESR and imaging  - no evidence of septic joint    ESRD on HD TTS  Continue HD.     HTN  Continue Entresto, Amlodipine and Metoprolol.     history PE/DVT  RA    DVT prophylaxis: Eliquis  GI prophylaxis: PPI    Code status: DNR/DNI   #Progress Note Handoff:  Pending (specify):  cardiology follow up, improving SOB  Family discussion:   Disposition: from SNF

## 2024-07-24 NOTE — PROGRESS NOTE ADULT - SUBJECTIVE AND OBJECTIVE BOX
RACHEL SUMMERS  88y, Female  Allergy: cephalosporins (Angioedema)  Keflex (Swelling)      LOS  4d    CHIEF COMPLAINT: Pneumonia (23 Jul 2024 16:13)      INTERVAL EVENTS/HPI  - T(F): , Max: 100.7 (07-23-24 @ 20:06) fever  - WBC Count: 5.59 (07-24-24 @ 06:48)  WBC Count: 6.73 (07-23-24 @ 06:38)     - Creatinine: 4.0 (07-23-24 @ 06:38)       ROS  ***    VITALS:  T(F): 99.1, Max: 100.7 (07-23-24 @ 20:06)  HR: 73  BP: 135/81  RR: 18Vital Signs Last 24 Hrs  T(C): 37.3 (24 Jul 2024 06:10), Max: 38.2 (23 Jul 2024 20:06)  T(F): 99.1 (24 Jul 2024 06:10), Max: 100.7 (23 Jul 2024 20:06)  HR: 73 (24 Jul 2024 05:03) (67 - 76)  BP: 135/81 (24 Jul 2024 05:03) (135/81 - 163/93)  BP(mean): --  RR: 18 (24 Jul 2024 05:03) (18 - 18)  SpO2: 95% (23 Jul 2024 21:13) (95% - 99%)    Parameters below as of 23 Jul 2024 21:13  Patient On (Oxygen Delivery Method): room air        PHYSICAL EXAM:  ***    FH: Non-contributory  Social Hx: Non-contributory    TESTS & MEASUREMENTS:                        9.9    5.59  )-----------( 207      ( 24 Jul 2024 06:48 )             31.3     07-23    133<L>  |  94<L>  |  43<H>  ----------------------------<  72  3.9   |  23  |  4.0<H>    Ca    8.3<L>      23 Jul 2024 06:38  Phos  4.1     07-23  Mg     1.9     07-23    TPro  5.9<L>  /  Alb  3.0<L>  /  TBili  0.5  /  DBili  x   /  AST  30  /  ALT  14  /  AlkPhos  172<H>  07-23      LIVER FUNCTIONS - ( 23 Jul 2024 06:38 )  Alb: 3.0 g/dL / Pro: 5.9 g/dL / ALK PHOS: 172 U/L / ALT: 14 U/L / AST: 30 U/L / GGT: x           Urinalysis Basic - ( 23 Jul 2024 06:38 )    Color: x / Appearance: x / SG: x / pH: x  Gluc: 72 mg/dL / Ketone: x  / Bili: x / Urobili: x   Blood: x / Protein: x / Nitrite: x   Leuk Esterase: x / RBC: x / WBC x   Sq Epi: x / Non Sq Epi: x / Bacteria: x        Culture - Blood (collected 07-20-24 @ 11:31)  Source: .Blood Blood  Preliminary Report (07-23-24 @ 22:01):    No growth at 72 Hours    Culture - Blood (collected 07-20-24 @ 11:31)  Source: .Blood Blood  Preliminary Report (07-23-24 @ 22:01):    No growth at 72 Hours        Blood Gas Venous - Lactate: 1.5 mmol/L (07-20-24 @ 09:29)      INFECTIOUS DISEASES TESTING  MRSA PCR Result.: Negative (07-22-24 @ 11:50)  Streptococcus pneumoniae Ag, Ur Result: Negative (07-22-24 @ 11:36)  Legionella Antigen, Urine: Negative (07-22-24 @ 11:36)  Procalcitonin: 0.23 (07-20-24 @ 14:32)  Rapid RVP Result: NotDetec (04-08-24 @ 00:15)  Procalcitonin, Serum: 0.70 (03-30-24 @ 09:03)  Rapid RVP Result: NotDetec (03-27-24 @ 12:02)  Procalcitonin, Serum: 0.13 (03-23-24 @ 16:00)  strept    INFLAMMATORY MARKERS      RADIOLOGY & ADDITIONAL TESTS:  I have personally reviewed the last available Chest xray  CXR      CT      CARDIOLOGY TESTING  12 Lead ECG:   Ventricular Rate 82 BPM    Atrial Rate 82 BPM    P-R Interval 148 ms    QRS Duration 134 ms    Q-T Interval 470 ms    QTC Calculation(Bazett) 549 ms    P Axis 0 degrees    R Axis -21 degrees    T Axis 161 degrees    Diagnosis Line Normal sinus rhythm  Non-specific intra-ventricular conduction block  Inferior infarct , age undetermined  Possible Anterolateral infarct , age undetermined  Abnormal ECG    Confirmed by Karina Hood ([id number]) on 7/21/2024 10:02:32 AM (07-21-24 @ 02:52)  12 Lead ECG:   Ventricular Rate 92 BPM    Atrial Rate 92 BPM    P-R Interval 154 ms    QRS Duration 142 ms    Q-T Interval 398 ms    QTC Calculation(Bazett) 492 ms    P Axis 51 degrees    R Axis -18 degrees    T Axis 140 degrees    Diagnosis Line Sinus rhythm with Premature atrial complexes  Left bundle branch block  Abnormal ECG  When compared with ECG of 08-APR-2024 02:18,  Premature atrial complexes are now Present  Confirmed by Karina Hood [id number]) on 7/20/2024 2:07:16 PM (07-20-24 @ 09:03)      MEDICATIONS  amLODIPine   Tablet 5 Oral daily  apixaban 2.5 Oral two times a day  atorvastatin 20 Oral at bedtime  chlorhexidine 2% Cloths 1 Topical daily  folic acid 1 Oral daily  furosemide   Injectable 40 IV Push two times a day  levoFLOXacin IVPB 500 IV Intermittent every 48 hours  metoprolol succinate  Oral daily  pantoprazole    Tablet 40 Oral before breakfast  sacubitril 24 mG/valsartan 26 mG 1 Oral <User Schedule>  sertraline 25 Oral daily  timolol 0.5% Solution 1 Both EYES two times a day      WEIGHT  Weight (kg): 56.7 (07-20-24 @ 08:59)      ANTIBIOTICS:  levoFLOXacin IVPB 500 milliGRAM(s) IV Intermittent every 48 hours      All available historical records have been reviewed       RACHEL SUMMERS  88y, Female  Allergy: cephalosporins (Angioedema)  Keflex (Swelling)      LOS  4d    CHIEF COMPLAINT: Pneumonia (23 Jul 2024 16:13)      INTERVAL EVENTS/HPI  - T(F): , Max: 100.7 (07-23-24 @ 20:06) fever, mild cough ,mild phlegm   - On further ROS reports L hip and knee pain, cannot bear weight  - WBC Count: 5.59 (07-24-24 @ 06:48)  WBC Count: 6.73 (07-23-24 @ 06:38)     - Creatinine: 4.0 (07-23-24 @ 06:38)       ROS  General: Denies rigors, nightsweats  HEENT: Denies headache, rhinorrhea, sore throat, eye pain  CV: Denies CP, palpitations  PULM: Denies wheezing, hemoptysis  GI: Denies hematemesis, hematochezia, melena  : Denies discharge, hematuria  MSK: as noted above   SKIN: Denies rash, lesions  NEURO: Denies paresthesias, weakness  PSYCH: Denies depression, anxiety     VITALS:  T(F): 99.1, Max: 100.7 (07-23-24 @ 20:06)  HR: 73  BP: 135/81  RR: 18Vital Signs Last 24 Hrs  T(C): 37.3 (24 Jul 2024 06:10), Max: 38.2 (23 Jul 2024 20:06)  T(F): 99.1 (24 Jul 2024 06:10), Max: 100.7 (23 Jul 2024 20:06)  HR: 73 (24 Jul 2024 05:03) (67 - 76)  BP: 135/81 (24 Jul 2024 05:03) (135/81 - 163/93)  BP(mean): --  RR: 18 (24 Jul 2024 05:03) (18 - 18)  SpO2: 95% (23 Jul 2024 21:13) (95% - 99%)    Parameters below as of 23 Jul 2024 21:13  Patient On (Oxygen Delivery Method): room air        PHYSICAL EXAM:  Gen: NAD, resting in bed  HEENT: Normocephalic, atraumatic  Neck: supple, no lymphadenopathy  CV: Regular rate & regular rhythm  Lungs: decreased BS at bases, no fremitus  Abdomen: Soft, BS present  Ext: Warm, well perfused, b/l knees arthritic changes, L> R warmth , tender, edema of L hip   Neuro: non focal, awake  Skin: no rash, no erythema  Lines: no phlebitis     FH: Non-contributory  Social Hx: Non-contributory    TESTS & MEASUREMENTS:                        9.9    5.59  )-----------( 207      ( 24 Jul 2024 06:48 )             31.3     07-23    133<L>  |  94<L>  |  43<H>  ----------------------------<  72  3.9   |  23  |  4.0<H>    Ca    8.3<L>      23 Jul 2024 06:38  Phos  4.1     07-23  Mg     1.9     07-23    TPro  5.9<L>  /  Alb  3.0<L>  /  TBili  0.5  /  DBili  x   /  AST  30  /  ALT  14  /  AlkPhos  172<H>  07-23      LIVER FUNCTIONS - ( 23 Jul 2024 06:38 )  Alb: 3.0 g/dL / Pro: 5.9 g/dL / ALK PHOS: 172 U/L / ALT: 14 U/L / AST: 30 U/L / GGT: x           Urinalysis Basic - ( 23 Jul 2024 06:38 )    Color: x / Appearance: x / SG: x / pH: x  Gluc: 72 mg/dL / Ketone: x  / Bili: x / Urobili: x   Blood: x / Protein: x / Nitrite: x   Leuk Esterase: x / RBC: x / WBC x   Sq Epi: x / Non Sq Epi: x / Bacteria: x        Culture - Blood (collected 07-20-24 @ 11:31)  Source: .Blood Blood  Preliminary Report (07-23-24 @ 22:01):    No growth at 72 Hours    Culture - Blood (collected 07-20-24 @ 11:31)  Source: .Blood Blood  Preliminary Report (07-23-24 @ 22:01):    No growth at 72 Hours        Blood Gas Venous - Lactate: 1.5 mmol/L (07-20-24 @ 09:29)      INFECTIOUS DISEASES TESTING  MRSA PCR Result.: Negative (07-22-24 @ 11:50)  Streptococcus pneumoniae Ag, Ur Result: Negative (07-22-24 @ 11:36)  Legionella Antigen, Urine: Negative (07-22-24 @ 11:36)  Procalcitonin: 0.23 (07-20-24 @ 14:32)  Rapid RVP Result: NotDetec (04-08-24 @ 00:15)  Procalcitonin, Serum: 0.70 (03-30-24 @ 09:03)  Rapid RVP Result: NotDetec (03-27-24 @ 12:02)  Procalcitonin, Serum: 0.13 (03-23-24 @ 16:00)  strept    INFLAMMATORY MARKERS      RADIOLOGY & ADDITIONAL TESTS:  I have personally reviewed the last available Chest xray  CXR      CT      CARDIOLOGY TESTING  12 Lead ECG:   Ventricular Rate 82 BPM    Atrial Rate 82 BPM    P-R Interval 148 ms    QRS Duration 134 ms    Q-T Interval 470 ms    QTC Calculation(Bazett) 549 ms    P Axis 0 degrees    R Axis -21 degrees    T Axis 161 degrees    Diagnosis Line Normal sinus rhythm  Non-specific intra-ventricular conduction block  Inferior infarct , age undetermined  Possible Anterolateral infarct , age undetermined  Abnormal ECG    Confirmed by Karina Hood ([id number]) on 7/21/2024 10:02:32 AM (07-21-24 @ 02:52)  12 Lead ECG:   Ventricular Rate 92 BPM    Atrial Rate 92 BPM    P-R Interval 154 ms    QRS Duration 142 ms    Q-T Interval 398 ms    QTC Calculation(Bazett) 492 ms    P Axis 51 degrees    R Axis -18 degrees    T Axis 140 degrees    Diagnosis Line Sinus rhythm with Premature atrial complexes  Left bundle branch block  Abnormal ECG  When compared with ECG of 08-APR-2024 02:18,  Premature atrial complexes are now Present  Confirmed by Karina Hood [id number]) on 7/20/2024 2:07:16 PM (07-20-24 @ 09:03)      MEDICATIONS  amLODIPine   Tablet 5 Oral daily  apixaban 2.5 Oral two times a day  atorvastatin 20 Oral at bedtime  chlorhexidine 2% Cloths 1 Topical daily  folic acid 1 Oral daily  furosemide   Injectable 40 IV Push two times a day  levoFLOXacin IVPB 500 IV Intermittent every 48 hours  metoprolol succinate  Oral daily  pantoprazole    Tablet 40 Oral before breakfast  sacubitril 24 mG/valsartan 26 mG 1 Oral <User Schedule>  sertraline 25 Oral daily  timolol 0.5% Solution 1 Both EYES two times a day      WEIGHT  Weight (kg): 56.7 (07-20-24 @ 08:59)      ANTIBIOTICS:  levoFLOXacin IVPB 500 milliGRAM(s) IV Intermittent every 48 hours      All available historical records have been reviewed

## 2024-07-24 NOTE — PHYSICAL THERAPY INITIAL EVALUATION ADULT - PERTINENT HX OF CURRENT PROBLEM, REHAB EVAL
pt adm for SOB, PNA, CHF
Ms. Marrufo is an 88-year-old female ([place]) PMH HTN, DVT/PE 2018, NICM - HFrEF improved (LVEF 40-45% in 4/23) s/p AICD, RA, ESRD on HD TTS who was brought in from [facility] for SOB.    The patient had been having non-productive cough and pleuritic chest pain since the morning of admission. She was being treated for UTI at the NH with Bactrim. Denied fevers, chills, LOC, N/V/D.    On presentation in the ED she was on 10L NRB satting 98% and was then placed on BiPAP for increased WOB.

## 2024-07-24 NOTE — PROGRESS NOTE ADULT - ASSESSMENT
Ms. Marrufo is an 88-year-old female ([place]) PMH HTN, DVT/PE 2018, NICM - HFrEF improved (LVEF 40-45% in 4/23) s/p AICD, RA, ESRD on HD TTS who was brought in from [facility] for SOB, admitted for possible RLL PNA.    Acute Hypoxemic Hypercapnic Respiratory Failure 2/2 CAP vs. HFrEF Exacerbation  -patient on Eliquis, doubt PE  -was on BiPAP at time of encounter  -pt coughing and SOB on presentation   -CXR: B/L opacities and effusions  Procal 0.23  MRSA neg   Trop downtrending   Plan:  - ID: change to PO levaquin 500mg post HD on HD days to complete 7 day end 7/26     -pt was febrile today so will send another Bx, sputum cx per ID   wean off BiPAP as tolerate  incentive spirometry  volume contraction with HD as tolerated  -BCx NGTD:      -f/u results   f/u BCx, sputum Cx, RVP, U strep & legionella  -f/u ID consult recs   c/w Lasix 40 mg IV BID    ESRD on HD TTS  -HD per nephro    HTN  HFrEF dec- now 25% per echo 7/23  HO PE/DVT  Echo: (7/23)  1. Normal left ventricular internal cavity size.   2. Severely decreased global left ventricular systolic function.   3. Left ventricular ejection fraction, by visual estimation, is 25 to   30%.   4. Moderate concentric left ventricular hypertrophy.   5. Spectral Doppler shows pseudonormal pattern of left ventricular   myocardial filling (Grade II diastolic dysfunction).   6. Normal right ventricular size and function.   7. Severely enlarged left atrium.   8. Mild mitral valve regurgitation.   9. Moderate tricuspid regurgitation.  10. Estimated pulmonary artery systolic pressure is 52.2 mmHg assuming a   right atrial pressure of 15 mmHg, which is consistent with moderate   pulmonary hypertension.  11. Moderate pleural effusion in the left lateral region.  12. Dilatation of the ascending aorta.  13. Compared to study dated 4/9/24, the LVEF has decreased.    -c/w Entresto 24 mg qd (unclear why not on bid dosing)  -c/w amlodipine 5 mg qd  -c/w atorvastatin 20 mg qhs  -c/w metoprolol ER 50 mg qd  -c/w Eliquis 2.5 mg bid  -f/u cardio recs  given dec EF and inc trop     RA  -OP f/u    DVT prophylaxis: Eliquis  GI prophylaxis: PPI  Diet: renal  Code status: DNR/DNI   Activity: IAT PT  Dispo: c/w antibx,          Ms. Marrufo is an 88-year-old female ([place]) PMH HTN, DVT/PE 2018, NICM - HFrEF improved (LVEF 40-45% in 4/23) s/p AICD, RA, ESRD on HD TTS who was brought in from [facility] for SOB, admitted for possible RLL PNA.    Acute Hypoxemic Hypercapnic Respiratory Failure 2/2 CAP vs. HFrEF Exacerbation  -patient on Eliquis, doubt PE  -was on BiPAP at time of encounter  -pt coughing and SOB on presentation   -CXR: B/L opacities and effusions  Procal 0.23  MRSA neg   Trop downtrending   Plan:  - ID: change to PO levaquin 500mg post HD on HD days to complete 7 day end 7/26     -pt was febrile today so will send another Bx, sputum cx per ID   wean off BiPAP as tolerate  incentive spirometry  volume contraction with HD as tolerated  -BCx NGTD:      -f/u results   f/u BCx, sputum Cx, RVP, U strep & legionella  -f/u ID consult recs   c/w Lasix 40 mg IV BID  -Pt test positive for parainfluenza    #L hip pain  -pt noted a fall before coming to the hospital  -will get X'ray    ESRD on HD TTS  -HD per nephro    HTN  HFrEF dec- now 25% per echo 7/23  HO PE/DVT  Echo: (7/23)  1. Normal left ventricular internal cavity size.   2. Severely decreased global left ventricular systolic function.   3. Left ventricular ejection fraction, by visual estimation, is 25 to   30%.   4. Moderate concentric left ventricular hypertrophy.   5. Spectral Doppler shows pseudonormal pattern of left ventricular   myocardial filling (Grade II diastolic dysfunction).   6. Normal right ventricular size and function.   7. Severely enlarged left atrium.   8. Mild mitral valve regurgitation.   9. Moderate tricuspid regurgitation.  10. Estimated pulmonary artery systolic pressure is 52.2 mmHg assuming a   right atrial pressure of 15 mmHg, which is consistent with moderate   pulmonary hypertension.  11. Moderate pleural effusion in the left lateral region.  12. Dilatation of the ascending aorta.  13. Compared to study dated 4/9/24, the LVEF has decreased.    -c/w Entresto 24 mg qd (unclear why not on bid dosing)  -c/w amlodipine 5 mg qd  -c/w atorvastatin 20 mg qhs  -c/w metoprolol ER 50 mg qd  -c/w Eliquis 2.5 mg bid  -f/u cardio recs  given dec EF and inc trop     RA  -OP f/u    DVT prophylaxis: Eliquis  GI prophylaxis: PPI  Diet: renal  Code status: DNR/DNI   Activity: IAT PT  Dispo: c/w antibx,

## 2024-07-24 NOTE — PHYSICAL THERAPY INITIAL EVALUATION ADULT - ADDITIONAL COMMENTS
per chart, pt lives alone in a private house, 3 steps to enter, 1 light inside, amb with a rollator, pt was admitted from [facility] for rehab
Pt. reports she has not walked in a few weeks. Pt. reports she came from NH and was not able to walk there. Pt. reports she fell PTA. At time of IE, no other info provided for reference for patient's PLOF.

## 2024-07-24 NOTE — PHYSICAL THERAPY INITIAL EVALUATION ADULT - GENERAL OBSERVATIONS, REHAB EVAL
13:05-13:15 10 min Chart reviewed, attempted to see pt for PT IE, PT spoke with CHICA Talbot, pt is currently off the unit at dialysis, will f/u for PT/IE 3-5x/week
Pt. encountered alert and NAD, supine in bed (+)tele (+)Primafit, agreeable to PT IE. Pt. left as found, supine in bed (+)alarms (+)tele (+)primafit, NAD

## 2024-07-24 NOTE — PROGRESS NOTE ADULT - NS ATTEST RISK PROBLEM GEN_ALL_CORE FT
I have personally seen and examined this patient.  I have reviewed all pertinent clinical information and reviewed all relevant imaging and diagnostic studies personally.   If possible, I counseled the patient about diagnostic testing and treatment plan.   I discussed my recommendations with the primary team and any family members at bedside.   - I assisted with prescription drug management (i.e discharge antibiotics) One acute illness with systemic symptoms- rule out RA flare, fever, rule out infectious etiology  - I discussed my recommendations with the primary team housestaff / Attending

## 2024-07-24 NOTE — PROGRESS NOTE ADULT - SUBJECTIVE AND OBJECTIVE BOX
HPI:    Chief Complaint: SOB    History of Present Illness & Past Medical History:  Ms. Marrufo is an 88-year-old female (Cheondoism) PMH HTN, DVT/PE 2018, NICM - HFrEF improved (LVEF 40-45% in 4/23) s/p AICD, RA, ESRD on HD TTS who was brought in from [facility] for SOB.    The patient had been having non-productive cough and pleuritic chest pain since the morning of admission. She was being treated for UTI at the NH with Bactrim. Denied fevers, chills, LOC, N/V/D.    On presentation in the ED she was on 10L NRB satting 98% and was then placed on BiPAP for increased WOB.    Cardiology initially was consulted during this admission for elevated troponin which were attributed to type 2  NSTEMI in light of sepsis and HF. Patient had new echo on 7/23 which now shows newly reduced EF 25-30% with G2DD.       Vitals in the ED:  /103  HR 98  SpO2 98% on 10L NRB  RR 24  Temp 98 F    Physical Exam:  GEN: appears chronically ill, on BiPAP  RESP: decreased BS bilaterally, R rhonchi  CARD: regular S1, S2, peripheral pulses palpable  ABD: NTND  EXT: no FABRICIO  NEURO: AOx3  PSYCH: cooperative    Labs:  WBC 12K  BUN/Cr 25/3.1    Trop 80  VBG: pCO2 55 pH 7.36 lactate 1.5    Imaging:  CXR: RLL infiltrate, pulmonary vascular congestion on my read  ECG showed LBBB unchanged from prior.    The patient received aztreonam and levofloxacin and was admitted to medicine. (20 Jul 2024 13:51)      PAST MEDICAL & SURGICAL HISTORY  Hypertension    Gout    Diverticulitis  sigmoid    Rheumatoid arthritis    DVT, lower extremity  Bilateral    Pulmonary embolism    Chronic HFrEF (heart failure with reduced ejection fraction)    AICD (automatic cardioverter/defibrillator) present    ESRD on dialysis    Artificial pacemaker    History of appendectomy    History of tonsillectomy    History of hysterectomy    H/O hernia repair        FAMILY HISTORY:  FAMILY HISTORY:  FH: arthritis  sister        SOCIAL HISTORY:  []smoker  []Alcohol  []Drug    ALLERGIES:  cephalosporins (Angioedema)  Keflex (Swelling)      MEDICATIONS:  MEDICATIONS  (STANDING):  amLODIPine   Tablet 5 milliGRAM(s) Oral daily  apixaban 2.5 milliGRAM(s) Oral two times a day  atorvastatin 20 milliGRAM(s) Oral at bedtime  chlorhexidine 2% Cloths 1 Application(s) Topical daily  folic acid 1 milliGRAM(s) Oral daily  furosemide   Injectable 40 milliGRAM(s) IV Push two times a day  levoFLOXacin  Tablet 500 milliGRAM(s) Oral <User Schedule>  metoprolol succinate  milliGRAM(s) Oral daily  pantoprazole    Tablet 40 milliGRAM(s) Oral before breakfast  sacubitril 24 mG/valsartan 26 mG 1 Tablet(s) Oral <User Schedule>  sertraline 25 milliGRAM(s) Oral daily  timolol 0.5% Solution 1 Drop(s) Both EYES two times a day    MEDICATIONS  (PRN):  acetaminophen     Tablet .. 325 milliGRAM(s) Oral every 4 hours PRN Mild Pain (1 - 3), Moderate Pain (4 - 6)      HOME MEDICATIONS:  Home Medications:  apixaban 2.5 mg oral tablet: 1 tab(s) orally 2 times a day (20 Jul 2024 13:45)  Entresto 24 mg-26 mg oral tablet: 1 tab(s) orally once a day (20 Jul 2024 13:44)  folic acid 1 mg oral tablet: 1 tab(s) orally once a day (20 Jul 2024 13:45)  Lipitor 20 mg oral tablet: 1 tab(s) orally once a day (at bedtime) (20 Jul 2024 13:45)  metoprolol succinate 50 mg oral capsule, extended release: 1 cap(s) orally once a day (20 Jul 2024 13:45)  Norvasc 5 mg oral tablet: 1 tab(s) orally once a day (20 Jul 2024 13:44)  sertraline 25 mg oral tablet: 1 tab(s) orally once a day (20 Jul 2024 13:45)  Timolol Maleate, Ophthalmic 0.5% preservative-free ophthalmic solution: 1 drop(s) to both eyes 2 times a day (20 Jul 2024 13:45)      VITALS:   T(F): 98.8 (07-24 @ 12:56), Max: 100.7 (07-23 @ 20:06)  HR: 61 (07-24 @ 12:56) (60 - 76)  BP: 136/75 (07-24 @ 12:56) (118/64 - 163/93)  BP(mean): 95 (07-22 @ 12:20) (86 - 95)  RR: 17 (07-24 @ 12:56) (17 - 18)  SpO2: 100% (07-24 @ 12:56) (95% - 100%)    I&O's Summary    23 Jul 2024 07:01  -  24 Jul 2024 07:00  --------------------------------------------------------  IN: 436 mL / OUT: 2300 mL / NET: -1864 mL    24 Jul 2024 07:01  -  24 Jul 2024 15:58  --------------------------------------------------------  IN: 336 mL / OUT: 0 mL / NET: 336 mL        REVIEW OF SYSTEMS:  CONSTITUTIONAL: No weakness, fevers or chills  EYES: No visual changes  ENT: No vertigo or throat pain   NECK: No pain or stiffness  RESPIRATORY: No cough, wheezing, hemoptysis; No shortness of breath  CARDIOVASCULAR: No chest pain or palpitations  GASTROINTESTINAL: No abdominal or epigastric pain. No nausea, vomiting, or hematemesis; No diarrhea or constipation. No melena or hematochezia.  GENITOURINARY: No dysuria, frequency or hematuria  NEUROLOGICAL: No numbness or weakness  SKIN: No itching, no rashes  MSK: no    PHYSICAL EXAM:  NEURO: patient is awake , alert and oriented  GEN: Not in acute distress  NECK: no thyroid enlargement, no JVD  LUNGS: bibasilar crackles present   CARDIOVASCULAR: S1/S2 present, RRR , no murmurs or rubs, no carotid bruits,  + PP bilaterally  ABD: Soft, non-tender, non-distended, +BS  EXT: No FABRICIO  SKIN: Intact    LABS:                        9.9    5.59  )-----------( 207      ( 24 Jul 2024 06:48 )             31.3     07-24    134<L>  |  96<L>  |  23<H>  ----------------------------<  84  3.8   |  25  |  2.8<H>    Ca    8.2<L>      24 Jul 2024 06:48  Phos  3.4     07-24  Mg     1.7     07-24    TPro  5.4<L>  /  Alb  2.8<L>  /  TBili  0.3  /  DBili  x   /  AST  28  /  ALT  14  /  AlkPhos  153<H>  07-24      Troponin trend:            RADIOLOGY:  -CXR:  -TTE:  -CCTA:  -STRESS TEST:  -CATHETERIZATION:    ECG:    TELEMETRY EVENTS:   HPI:    Chief Complaint: SOB    History of Present Illness & Past Medical History:  Ms. Marrufo is an 88-year-old female (S[place]ianism) PMH HTN, DVT/PE 2018, NICM - HFrEF improved (LVEF 40-45% in 4/23) s/p AICD, RA, ESRD on HD TTS who was brought in from [facility] for SOB.    The patient had been having non-productive cough and pleuritic chest pain since the morning of admission. She was being treated for UTI at the NH with Bactrim. Denied fevers, chills, LOC, N/V/D.    On presentation in the ED she was on 10L NRB satting 98% and was then placed on BiPAP for increased WOB.    Cardiology initially was consulted during this admission for elevated troponin which were attributed to type 2  NSTEMI in light of sepsis and HF. Patient had new echo on 7/23 which now shows newly reduced EF 25-30% with G2DD.       Vitals in the ED:  /103  HR 98  SpO2 98% on 10L NRB  RR 24  Temp 98 F    Physical Exam:  GEN: appears chronically ill, on BiPAP  RESP: decreased BS bilaterally, R rhonchi  CARD: regular S1, S2, peripheral pulses palpable  ABD: NTND  EXT: no FABRICIO  NEURO: AOx3  PSYCH: cooperative    Labs:  WBC 12K  BUN/Cr 25/3.1    Trop 80  VBG: pCO2 55 pH 7.36 lactate 1.5    Imaging:  CXR: RLL infiltrate, pulmonary vascular congestion on my read  ECG showed LBBB unchanged from prior.    The patient received aztreonam and levofloxacin and was admitted to medicine. (20 Jul 2024 13:51)      PAST MEDICAL & SURGICAL HISTORY  Hypertension    Gout    Diverticulitis  sigmoid    Rheumatoid arthritis    DVT, lower extremity  Bilateral    Pulmonary embolism    Chronic HFrEF (heart failure with reduced ejection fraction)    AICD (automatic cardioverter/defibrillator) present    ESRD on dialysis    Artificial pacemaker    History of appendectomy    History of tonsillectomy    History of hysterectomy    H/O hernia repair        FAMILY HISTORY:  FAMILY HISTORY:  FH: arthritis  sister        SOCIAL HISTORY:  []smoker  []Alcohol  []Drug    ALLERGIES:  cephalosporins (Angioedema)  Keflex (Swelling)      MEDICATIONS:  MEDICATIONS  (STANDING):  amLODIPine   Tablet 5 milliGRAM(s) Oral daily  apixaban 2.5 milliGRAM(s) Oral two times a day  atorvastatin 20 milliGRAM(s) Oral at bedtime  chlorhexidine 2% Cloths 1 Application(s) Topical daily  folic acid 1 milliGRAM(s) Oral daily  furosemide   Injectable 40 milliGRAM(s) IV Push two times a day  levoFLOXacin  Tablet 500 milliGRAM(s) Oral <User Schedule>  metoprolol succinate  milliGRAM(s) Oral daily  pantoprazole    Tablet 40 milliGRAM(s) Oral before breakfast  sacubitril 24 mG/valsartan 26 mG 1 Tablet(s) Oral <User Schedule>  sertraline 25 milliGRAM(s) Oral daily  timolol 0.5% Solution 1 Drop(s) Both EYES two times a day    MEDICATIONS  (PRN):  acetaminophen     Tablet .. 325 milliGRAM(s) Oral every 4 hours PRN Mild Pain (1 - 3), Moderate Pain (4 - 6)      HOME MEDICATIONS:  Home Medications:  apixaban 2.5 mg oral tablet: 1 tab(s) orally 2 times a day (20 Jul 2024 13:45)  Entresto 24 mg-26 mg oral tablet: 1 tab(s) orally once a day (20 Jul 2024 13:44)  folic acid 1 mg oral tablet: 1 tab(s) orally once a day (20 Jul 2024 13:45)  Lipitor 20 mg oral tablet: 1 tab(s) orally once a day (at bedtime) (20 Jul 2024 13:45)  metoprolol succinate 50 mg oral capsule, extended release: 1 cap(s) orally once a day (20 Jul 2024 13:45)  Norvasc 5 mg oral tablet: 1 tab(s) orally once a day (20 Jul 2024 13:44)  sertraline 25 mg oral tablet: 1 tab(s) orally once a day (20 Jul 2024 13:45)  Timolol Maleate, Ophthalmic 0.5% preservative-free ophthalmic solution: 1 drop(s) to both eyes 2 times a day (20 Jul 2024 13:45)      VITALS:   T(F): 98.8 (07-24 @ 12:56), Max: 100.7 (07-23 @ 20:06)  HR: 61 (07-24 @ 12:56) (60 - 76)  BP: 136/75 (07-24 @ 12:56) (118/64 - 163/93)  BP(mean): 95 (07-22 @ 12:20) (86 - 95)  RR: 17 (07-24 @ 12:56) (17 - 18)  SpO2: 100% (07-24 @ 12:56) (95% - 100%)    I&O's Summary    23 Jul 2024 07:01  -  24 Jul 2024 07:00  --------------------------------------------------------  IN: 436 mL / OUT: 2300 mL / NET: -1864 mL    24 Jul 2024 07:01  -  24 Jul 2024 15:58  --------------------------------------------------------  IN: 336 mL / OUT: 0 mL / NET: 336 mL        REVIEW OF SYSTEMS:  CONSTITUTIONAL: No weakness, fevers or chills  EYES: No visual changes  ENT: No vertigo or throat pain   NECK: No pain or stiffness  RESPIRATORY: No cough, wheezing, hemoptysis; No shortness of breath  CARDIOVASCULAR: No chest pain or palpitations  GASTROINTESTINAL: No abdominal or epigastric pain. No nausea, vomiting, or hematemesis; No diarrhea or constipation. No melena or hematochezia.  GENITOURINARY: No dysuria, frequency or hematuria  NEUROLOGICAL: No numbness or weakness  SKIN: No itching, no rashes  MSK: no    PHYSICAL EXAM:  NEURO: patient is awake , alert and oriented  GEN: Not in acute distress  NECK: no thyroid enlargement, no JVD  LUNGS: bibasilar crackles present   CARDIOVASCULAR: S1/S2 present, RRR , no murmurs or rubs, no carotid bruits,  + PP bilaterally  ABD: Soft, non-tender, non-distended, +BS  EXT: No FABRICIO  SKIN: Intact    LABS:                        9.9    5.59  )-----------( 207      ( 24 Jul 2024 06:48 )             31.3     07-24    134<L>  |  96<L>  |  23<H>  ----------------------------<  84  3.8   |  25  |  2.8<H>    Ca    8.2<L>      24 Jul 2024 06:48  Phos  3.4     07-24  Mg     1.7     07-24    TPro  5.4<L>  /  Alb  2.8<L>  /  TBili  0.3  /  DBili  x   /  AST  28  /  ALT  14  /  AlkPhos  153<H>  07-24      Troponin trend:            RADIOLOGY:  -CXR:  -TTE:  < from: TTE Echo Complete w/o Contrast w/ Doppler (07.23.24 @ 09:16) >    Summary:   1. Normal left ventricular internal cavity size.   2. Severely decreased global left ventricular systolic function.   3. Left ventricular ejection fraction, by visual estimation, is 25 to   30%.   4. Moderate concentric left ventricular hypertrophy.   5. Spectral Doppler shows pseudonormal pattern of left ventricular   myocardial filling (Grade II diastolic dysfunction).   6. Normal right ventricular size and function.   7. Severely enlarged left atrium.   8. Mild mitral valve regurgitation.   9. Moderate tricuspid regurgitation.  10. Estimated pulmonary artery systolic pressure is 52.2 mmHg assuming a   right atrial pressure of 15 mmHg, which is consistent with moderate   pulmonary hypertension.  11. Moderate pleural effusion in the left lateral region.  12. Dilatation of the ascending aorta.  13. Compared to study dated 4/9/24, the LVEF has decreased.    < end of copied text >      -CCTA:  -STRESS TEST:  -CATHETERIZATION:    ECG:    TELEMETRY EVENTS:

## 2024-07-24 NOTE — PROGRESS NOTE ADULT - ASSESSMENT
88-year-old female (Sikh) PMH HTN, DVT/PE 2018, NICM - HFrEF improved (LVEF 40-45% in 4/23) s/p AICD, RA, ESRD on HD TTS who was brought in from [facility] for SOB.  # ESRD on HD T TH Sat   # SOB volume overload? CHF   -for HD in am  3 k  bath uf 2 liters as tolerated   - IP at target  no binders   - febrile / last BC negative / followed by ID / has TDC   - h/h noted / no need for ELIJAH   - bp controlled   will follow

## 2024-07-24 NOTE — PROGRESS NOTE ADULT - SUBJECTIVE AND OBJECTIVE BOX
CHIEF COMPLAINT:    Patient is a 88y old  Female who presents with a chief complaint of Pneumonia     INTERVAL HPI/OVERNIGHT EVENTS:    Patient seen and examined at bedside. No acute overnight events occurred.    ROS: Denies knee and hip pain. All other systems are negative.    Medications:  Standing  amLODIPine   Tablet 5 milliGRAM(s) Oral daily  apixaban 2.5 milliGRAM(s) Oral two times a day  atorvastatin 20 milliGRAM(s) Oral at bedtime  chlorhexidine 2% Cloths 1 Application(s) Topical daily  folic acid 1 milliGRAM(s) Oral daily  furosemide   Injectable 40 milliGRAM(s) IV Push two times a day  levoFLOXacin  Tablet 500 milliGRAM(s) Oral <User Schedule>  metoprolol succinate  milliGRAM(s) Oral daily  pantoprazole    Tablet 40 milliGRAM(s) Oral before breakfast  sacubitril 24 mG/valsartan 26 mG 1 Tablet(s) Oral <User Schedule>  sertraline 25 milliGRAM(s) Oral daily  timolol 0.5% Solution 1 Drop(s) Both EYES two times a day    PRN Meds  acetaminophen     Tablet .. 325 milliGRAM(s) Oral every 4 hours PRN        Vital Signs:    T(F): 98.8 (24 @ 12:56), Max: 100.7 (24 @ 20:06)  HR: 61 (24 @ 12:56) (61 - 76)  BP: 136/75 (24 @ 12:56) (135/81 - 153/74)  RR: 17 (24 @ 12:56) (17 - 18)  SpO2: 100% (24 @ 12:56) (95% - 100%)  I&O's Summary    2024 07:  -  2024 07:00  --------------------------------------------------------  IN: 436 mL / OUT: 2300 mL / NET: -1864 mL    2024 07:  -  2024 16:00  --------------------------------------------------------  IN: 336 mL / OUT: 0 mL / NET: 336 mL      Daily     Daily Weight in k.8 (2024 06:10)  CAPILLARY BLOOD GLUCOSE          PHYSICAL EXAM:  GENERAL:  NAD  SKIN: No rashes or lesions  HEENT: Atraumatic. Normocephalic. Anicteric  NECK:  No JVD.   PULMONARY: Clear to ausculation bilaterally. No wheezing. No rales  CVS: Normal S1, S2. Regular rate and rhythm. No murmurs.  ABDOMEN/GI: Soft, Nontender, Nondistended; Bowel sounds are present  EXTREMITIES:  No edema B/L LE. has ulnar deviation of b/l hands. Knees do not appear swollen, are not tender.   NEUROLOGIC:  No motor deficit.  PSYCH: Alert & oriented x 3, normal affect      LABS:                        9.9    5.59  )-----------( 207      ( 2024 06:48 )             31.3     07-24    134<L>  |  96<L>  |  23<H>  ----------------------------<  84  3.8   |  25  |  2.8<H>    Ca    8.2<L>      2024 06:48  Phos  3.4     07-24  Mg     1.7     07-24    TPro  5.4<L>  /  Alb  2.8<L>  /  TBili  0.3  /  DBili  x   /  AST  28  /  ALT  14  /  AlkPhos  153<H>  -24              RADIOLOGY & ADDITIONAL TESTS:  Imaging or report Personally Reviewed:  [ ] YES  [ ] NO -->no new images    Telemetry reviewed independently -nsvt  EKG reviewed independently -->no new EKGs    Consultant(s) Notes Reviewed:  [ ] YES  [ ] NO  Care Discussed with Consultants/Other Providers [ ] YES  [ ] NO    Case discussed with resident  Care discussed with pt         CHIEF COMPLAINT:    Patient is a 88y old  Female who presents with a chief complaint of Pneumonia     INTERVAL HPI/OVERNIGHT EVENTS:    Patient seen and examined at bedside. No acute overnight events occurred.    ROS: Denies knee and hip pain. All other systems are negative.    Medications:  Standing  amLODIPine   Tablet 5 milliGRAM(s) Oral daily  apixaban 2.5 milliGRAM(s) Oral two times a day  atorvastatin 20 milliGRAM(s) Oral at bedtime  chlorhexidine 2% Cloths 1 Application(s) Topical daily  folic acid 1 milliGRAM(s) Oral daily  furosemide   Injectable 40 milliGRAM(s) IV Push two times a day  levoFLOXacin  Tablet 500 milliGRAM(s) Oral <User Schedule>  metoprolol succinate  milliGRAM(s) Oral daily  pantoprazole    Tablet 40 milliGRAM(s) Oral before breakfast  sacubitril 24 mG/valsartan 26 mG 1 Tablet(s) Oral <User Schedule>  sertraline 25 milliGRAM(s) Oral daily  timolol 0.5% Solution 1 Drop(s) Both EYES two times a day    PRN Meds  acetaminophen     Tablet .. 325 milliGRAM(s) Oral every 4 hours PRN        Vital Signs:    T(F): 98.8 (24 @ 12:56), Max: 100.7 (24 @ 20:06)  HR: 61 (24 @ 12:56) (61 - 76)  BP: 136/75 (24 @ 12:56) (135/81 - 153/74)  RR: 17 (24 @ 12:56) (17 - 18)  SpO2: 100% (24 @ 12:56) (95% - 100%)  I&O's Summary    2024 07:  -  2024 07:00  --------------------------------------------------------  IN: 436 mL / OUT: 2300 mL / NET: -1864 mL    2024 07:  -  2024 16:00  --------------------------------------------------------  IN: 336 mL / OUT: 0 mL / NET: 336 mL      Daily     Daily Weight in k.8 (2024 06:10)  CAPILLARY BLOOD GLUCOSE          PHYSICAL EXAM:  GENERAL:  NAD  SKIN: No rashes or lesions  HEENT: Atraumatic. Normocephalic. Anicteric  NECK:  No JVD.   PULMONARY: Clear to ausculation bilaterally. No wheezing. No rales  CVS: Normal S1, S2. Regular rate and rhythm. No murmurs.  ABDOMEN/GI: Soft, Nontender, Nondistended; Bowel sounds are present  EXTREMITIES:  No edema B/L LE. has ulnar deviation of b/l hands. Knees do not appear swollen, are not tender.   NEUROLOGIC:  No motor deficit.  PSYCH: Alert & oriented x 3, normal affect      LABS:                        9.9    5.59  )-----------( 207      ( 2024 06:48 )             31.3     07-24    134<L>  |  96<L>  |  23<H>  ----------------------------<  84  3.8   |  25  |  2.8<H>    Ca    8.2<L>      2024 06:48  Phos  3.4     07-24  Mg     1.7     07-24    TPro  5.4<L>  /  Alb  2.8<L>  /  TBili  0.3  /  DBili  x   /  AST  28  /  ALT  14  /  AlkPhos  153<H>  -24      RADIOLOGY & ADDITIONAL TESTS:  Imaging or report Personally Reviewed:  [ ] YES  [ ] NO -->no new images    Telemetry reviewed independently -nsvt  EKG reviewed independently -->no new EKGs    Consultant(s) Notes Reviewed:  [ ] YES  [ ] NO  Care Discussed with Consultants/Other Providers [ ] YES  [ ] NO    Case discussed with resident  Care discussed with pt

## 2024-07-25 LAB
ALBUMIN SERPL ELPH-MCNC: 2.8 G/DL — LOW (ref 3.5–5.2)
ALP SERPL-CCNC: 147 U/L — HIGH (ref 30–115)
ALT FLD-CCNC: 13 U/L — SIGNIFICANT CHANGE UP (ref 0–41)
ANION GAP SERPL CALC-SCNC: 12 MMOL/L — SIGNIFICANT CHANGE UP (ref 7–14)
AST SERPL-CCNC: 27 U/L — SIGNIFICANT CHANGE UP (ref 0–41)
BASOPHILS # BLD AUTO: 0.03 K/UL — SIGNIFICANT CHANGE UP (ref 0–0.2)
BASOPHILS NFR BLD AUTO: 0.5 % — SIGNIFICANT CHANGE UP (ref 0–1)
BILIRUB SERPL-MCNC: 0.3 MG/DL — SIGNIFICANT CHANGE UP (ref 0.2–1.2)
BUN SERPL-MCNC: 34 MG/DL — HIGH (ref 10–20)
CALCIUM SERPL-MCNC: 8.1 MG/DL — LOW (ref 8.4–10.4)
CHLORIDE SERPL-SCNC: 94 MMOL/L — LOW (ref 98–110)
CO2 SERPL-SCNC: 24 MMOL/L — SIGNIFICANT CHANGE UP (ref 17–32)
CREAT SERPL-MCNC: 3.7 MG/DL — HIGH (ref 0.7–1.5)
CRP SERPL-MCNC: 70.5 MG/L — HIGH
CULTURE RESULTS: SIGNIFICANT CHANGE UP
CULTURE RESULTS: SIGNIFICANT CHANGE UP
EGFR: 11 ML/MIN/1.73M2 — LOW
EOSINOPHIL # BLD AUTO: 0.64 K/UL — SIGNIFICANT CHANGE UP (ref 0–0.7)
EOSINOPHIL NFR BLD AUTO: 10.1 % — HIGH (ref 0–8)
ERYTHROCYTE [SEDIMENTATION RATE] IN BLOOD: 105 MM/HR — HIGH (ref 0–20)
GLUCOSE SERPL-MCNC: 76 MG/DL — SIGNIFICANT CHANGE UP (ref 70–99)
HCT VFR BLD CALC: 31.1 % — LOW (ref 37–47)
HGB BLD-MCNC: 10 G/DL — LOW (ref 12–16)
IMM GRANULOCYTES NFR BLD AUTO: 0.3 % — SIGNIFICANT CHANGE UP (ref 0.1–0.3)
LYMPHOCYTES # BLD AUTO: 2.16 K/UL — SIGNIFICANT CHANGE UP (ref 1.2–3.4)
LYMPHOCYTES # BLD AUTO: 34.2 % — SIGNIFICANT CHANGE UP (ref 20.5–51.1)
MAGNESIUM SERPL-MCNC: 2.8 MG/DL — HIGH (ref 1.8–2.4)
MCHC RBC-ENTMCNC: 31.4 PG — HIGH (ref 27–31)
MCHC RBC-ENTMCNC: 32.2 G/DL — SIGNIFICANT CHANGE UP (ref 32–37)
MCV RBC AUTO: 97.8 FL — SIGNIFICANT CHANGE UP (ref 81–99)
MONOCYTES # BLD AUTO: 1.16 K/UL — HIGH (ref 0.1–0.6)
MONOCYTES NFR BLD AUTO: 18.4 % — HIGH (ref 1.7–9.3)
NEUTROPHILS # BLD AUTO: 2.3 K/UL — SIGNIFICANT CHANGE UP (ref 1.4–6.5)
NEUTROPHILS NFR BLD AUTO: 36.5 % — LOW (ref 42.2–75.2)
NRBC # BLD: 0 /100 WBCS — SIGNIFICANT CHANGE UP (ref 0–0)
PHOSPHATE SERPL-MCNC: 4.2 MG/DL — SIGNIFICANT CHANGE UP (ref 2.1–4.9)
PLATELET # BLD AUTO: 212 K/UL — SIGNIFICANT CHANGE UP (ref 130–400)
PMV BLD: 9.3 FL — SIGNIFICANT CHANGE UP (ref 7.4–10.4)
POTASSIUM SERPL-MCNC: 4.3 MMOL/L — SIGNIFICANT CHANGE UP (ref 3.5–5)
POTASSIUM SERPL-SCNC: 4.3 MMOL/L — SIGNIFICANT CHANGE UP (ref 3.5–5)
PROT SERPL-MCNC: 5.4 G/DL — LOW (ref 6–8)
RBC # BLD: 3.18 M/UL — LOW (ref 4.2–5.4)
RBC # FLD: 14.3 % — SIGNIFICANT CHANGE UP (ref 11.5–14.5)
RHEUMATOID FACT SERPL-ACNC: 22 IU/ML — HIGH (ref 0–13)
SODIUM SERPL-SCNC: 130 MMOL/L — LOW (ref 135–146)
SPECIMEN SOURCE: SIGNIFICANT CHANGE UP
SPECIMEN SOURCE: SIGNIFICANT CHANGE UP
URATE SERPL-MCNC: 4.2 MG/DL — SIGNIFICANT CHANGE UP (ref 2.5–7)
WBC # BLD: 6.31 K/UL — SIGNIFICANT CHANGE UP (ref 4.8–10.8)
WBC # FLD AUTO: 6.31 K/UL — SIGNIFICANT CHANGE UP (ref 4.8–10.8)

## 2024-07-25 PROCEDURE — 71045 X-RAY EXAM CHEST 1 VIEW: CPT | Mod: 26

## 2024-07-25 PROCEDURE — 99232 SBSQ HOSP IP/OBS MODERATE 35: CPT

## 2024-07-25 RX ORDER — ACETAMINOPHEN 500 MG
650 TABLET ORAL EVERY 6 HOURS
Refills: 0 | Status: DISCONTINUED | OUTPATIENT
Start: 2024-07-25 | End: 2024-08-01

## 2024-07-25 RX ORDER — ACETAMINOPHEN 500 MG
650 TABLET ORAL ONCE
Refills: 0 | Status: COMPLETED | OUTPATIENT
Start: 2024-07-25 | End: 2024-07-25

## 2024-07-25 RX ADMIN — PANTOPRAZOLE SODIUM 40 MILLIGRAM(S): 20 TABLET, DELAYED RELEASE ORAL at 05:30

## 2024-07-25 RX ADMIN — TIMOLOL MALEATE 1 DROP(S): 2.5 SOLUTION/ DROPS OPHTHALMIC at 05:30

## 2024-07-25 RX ADMIN — SACUBITRIL AND VALSARTAN 1 TABLET(S): 49; 51 TABLET, FILM COATED ORAL at 05:29

## 2024-07-25 RX ADMIN — SERTRALINE HYDROCHLORIDE 25 MILLIGRAM(S): 100 TABLET, FILM COATED ORAL at 11:03

## 2024-07-25 RX ADMIN — APIXABAN 2.5 MILLIGRAM(S): 5 TABLET, FILM COATED ORAL at 17:11

## 2024-07-25 RX ADMIN — CHLORHEXIDINE GLUCONATE 1 APPLICATION(S): 500 CLOTH TOPICAL at 11:02

## 2024-07-25 RX ADMIN — TIMOLOL MALEATE 1 DROP(S): 2.5 SOLUTION/ DROPS OPHTHALMIC at 17:11

## 2024-07-25 RX ADMIN — Medication 650 MILLIGRAM(S): at 05:54

## 2024-07-25 RX ADMIN — Medication 1 MILLIGRAM(S): at 11:02

## 2024-07-25 RX ADMIN — AMLODIPINE BESYLATE 5 MILLIGRAM(S): 2.5 TABLET ORAL at 05:29

## 2024-07-25 RX ADMIN — ATORVASTATIN CALCIUM 20 MILLIGRAM(S): 40 TABLET, FILM COATED ORAL at 21:26

## 2024-07-25 RX ADMIN — FUROSEMIDE 40 MILLIGRAM(S): 10 INJECTION, SOLUTION INTRAVENOUS at 14:59

## 2024-07-25 RX ADMIN — Medication 100 MILLIGRAM(S): at 05:29

## 2024-07-25 RX ADMIN — FUROSEMIDE 40 MILLIGRAM(S): 10 INJECTION, SOLUTION INTRAVENOUS at 05:30

## 2024-07-25 RX ADMIN — APIXABAN 2.5 MILLIGRAM(S): 5 TABLET, FILM COATED ORAL at 05:30

## 2024-07-25 NOTE — PROGRESS NOTE ADULT - SUBJECTIVE AND OBJECTIVE BOX
CHIEF COMPLAINT:    Patient is a 88y old  Female who presents with a chief complaint of Pneumonia    INTERVAL HPI/OVERNIGHT EVENTS:    Patient seen and examined at bedside. No acute overnight events occurred.    ROS: Denies SOB, chest pain. Reports left hip and knee pain. All other systems are negative.    Medications:  Standing  amLODIPine   Tablet 5 milliGRAM(s) Oral daily  apixaban 2.5 milliGRAM(s) Oral two times a day  atorvastatin 20 milliGRAM(s) Oral at bedtime  chlorhexidine 2% Cloths 1 Application(s) Topical daily  folic acid 1 milliGRAM(s) Oral daily  furosemide   Injectable 40 milliGRAM(s) IV Push two times a day  metoprolol succinate  milliGRAM(s) Oral daily  pantoprazole    Tablet 40 milliGRAM(s) Oral before breakfast  sacubitril 24 mG/valsartan 26 mG 1 Tablet(s) Oral <User Schedule>  sertraline 25 milliGRAM(s) Oral daily  timolol 0.5% Solution 1 Drop(s) Both EYES two times a day    PRN Meds  acetaminophen     Tablet .. 325 milliGRAM(s) Oral every 4 hours PRN        Vital Signs:    T(F): 98.6 (07-25-24 @ 05:05), Max: 98.6 (07-25-24 @ 05:05)  HR: 60 (07-25-24 @ 11:45) (60 - 65)  BP: 113/55 (07-25-24 @ 11:45) (113/55 - 137/75)  RR: 18 (07-25-24 @ 11:45) (18 - 18)  SpO2: 99% (07-25-24 @ 11:45) (97% - 99%)  I&O's Summary    24 Jul 2024 07:01  -  25 Jul 2024 07:00  --------------------------------------------------------  IN: 595 mL / OUT: 300 mL / NET: 295 mL        PHYSICAL EXAM:  GENERAL:  NAD  SKIN: No rashes or lesions  HEENT: Atraumatic. Normocephalic. Anicteric  NECK:  No JVD.   PULMONARY: Clear to ausculation bilaterally. No wheezing. No rales  CVS: Normal S1, S2. Regular rate and rhythm. No murmurs.  ABDOMEN/GI: Soft, Nontender, Nondistended; Bowel sounds are present  EXTREMITIES:  No edema B/L LE. b/l ulnar deviation  NEUROLOGIC:  No motor deficit.  PSYCH: Alert & oriented x 3, normal affect      LABS:                        10.0   6.31  )-----------( 212      ( 25 Jul 2024 05:46 )             31.1     07-25    130<L>  |  94<L>  |  34<H>  ----------------------------<  76  4.3   |  24  |  3.7<H>    Ca    8.1<L>      25 Jul 2024 05:46  Phos  4.2     07-25  Mg     2.8     07-25    TPro  5.4<L>  /  Alb  2.8<L>  /  TBili  0.3  /  DBili  x   /  AST  27  /  ALT  13  /  AlkPhos  147<H>  07-25              RADIOLOGY & ADDITIONAL TESTS:  Imaging or report Personally Reviewed:  [ ] YES  [ ] NO -->no new images    Telemetry reviewed independently - NSR, no acute events  EKG reviewed independently -->no new EKGs    Consultant(s) Notes Reviewed:  [ ] YES  [ ] NO  Care Discussed with Consultants/Other Providers [ ] YES  [ ] NO    Case discussed with resident  Care discussed with pt

## 2024-07-25 NOTE — PROGRESS NOTE ADULT - SUBJECTIVE AND OBJECTIVE BOX
seen and examined  24 h events noted   no distress         PAST HISTORY  --------------------------------------------------------------------------------  No significant changes to PMH, PSH, FHx, SHx, unless otherwise noted    ALLERGIES & MEDICATIONS  --------------------------------------------------------------------------------  Allergies    cephalosporins (Angioedema)  Keflex (Swelling)    Intolerances      Standing Inpatient Medications  amLODIPine   Tablet 5 milliGRAM(s) Oral daily  apixaban 2.5 milliGRAM(s) Oral two times a day  atorvastatin 20 milliGRAM(s) Oral at bedtime  chlorhexidine 2% Cloths 1 Application(s) Topical daily  folic acid 1 milliGRAM(s) Oral daily  furosemide   Injectable 40 milliGRAM(s) IV Push two times a day  levoFLOXacin  Tablet 500 milliGRAM(s) Oral <User Schedule>  metoprolol succinate  milliGRAM(s) Oral daily  pantoprazole    Tablet 40 milliGRAM(s) Oral before breakfast  sacubitril 24 mG/valsartan 26 mG 1 Tablet(s) Oral <User Schedule>  sertraline 25 milliGRAM(s) Oral daily  timolol 0.5% Solution 1 Drop(s) Both EYES two times a day    PRN Inpatient Medications  acetaminophen     Tablet .. 325 milliGRAM(s) Oral every 4 hours PRN        VITALS/PHYSICAL EXAM  --------------------------------------------------------------------------------  T(C): 37 (07-25-24 @ 05:05), Max: 37.1 (07-24-24 @ 12:56)  HR: 65 (07-25-24 @ 05:05) (61 - 65)  BP: 125/63 (07-25-24 @ 05:05) (125/63 - 137/75)  RR: 18 (07-25-24 @ 05:05) (17 - 18)  SpO2: 99% (07-25-24 @ 05:05) (97% - 100%)  Wt(kg): --        07-24-24 @ 07:01  -  07-25-24 @ 07:00  --------------------------------------------------------  IN: 336 mL / OUT: 300 mL / NET: 36 mL      Physical Exam:  	Gen: NAD  	Pulm: decrease BS  B/L  	CV:  S1S2; no rub  	Abd: soft, /nondistended  	LE: no edema  	Vascular access:tdc     LABS/STUDIES  --------------------------------------------------------------------------------              10.0   6.31  >-----------<  212      [07-25-24 @ 05:46]              31.1     134  |  96  |  23  ----------------------------<  84      [07-24-24 @ 06:48]  3.8   |  25  |  2.8        Ca     8.2     [07-24-24 @ 06:48]      Mg     1.7     [07-24-24 @ 06:48]      Phos  3.4     [07-24-24 @ 06:48]    TPro  5.4  /  Alb  2.8  /  TBili  0.3  /  DBili  x   /  AST  28  /  ALT  14  /  AlkPhos  153  [07-24-24 @ 06:48]        Creatinine Trend:  SCr 2.8 [07-24 @ 06:48]  SCr 4.0 [07-23 @ 06:38]  SCr 3.5 [07-22 @ 05:33]  SCr 3.1 [07-20 @ 09:40]    Urinalysis - [07-24-24 @ 06:48]      Color  / Appearance  / SG  / pH       Gluc 84 / Ketone   / Bili  / Urobili        Blood  / Protein  / Leuk Est  / Nitrite       RBC  / WBC  / Hyaline  / Gran  / Sq Epi  / Non Sq Epi  / Bacteria       Iron 33, TIBC 147, %sat 22      [04-04-24 @ 06:44]  Ferritin 785      [04-04-24 @ 06:44]  PTH -- (Ca 8.2)      [04-09-24 @ 05:43]   35  PTH -- (Ca 8.2)      [03-24-24 @ 20:00]   138  Vitamin D (25OH) 11      [04-09-24 @ 05:43]  Lipid: chol 144, , HDL 39, LDL --      [03-22-24 @ 06:14]

## 2024-07-25 NOTE — PROGRESS NOTE ADULT - SUBJECTIVE AND OBJECTIVE BOX
SUBJECTIVE/OVERNIGHT EVENTS  Today is hospital day 5d. This morning patient was seen and examined at bedside, resting comfortably in bed. No acute or major events overnight. Pt noted hip pain. Otherwise denies any complaints.    CODE STATUS: DNR/DNI    FAMILY COMMUNICATION  Contact date:  Name of person contacted:  Relationship to patient:  Communication details:    MEDICATIONS  STANDING MEDICATIONS  amLODIPine   Tablet 5 milliGRAM(s) Oral daily  apixaban 2.5 milliGRAM(s) Oral two times a day  atorvastatin 20 milliGRAM(s) Oral at bedtime  chlorhexidine 2% Cloths 1 Application(s) Topical daily  folic acid 1 milliGRAM(s) Oral daily  furosemide   Injectable 40 milliGRAM(s) IV Push two times a day  metoprolol succinate  milliGRAM(s) Oral daily  pantoprazole    Tablet 40 milliGRAM(s) Oral before breakfast  sacubitril 24 mG/valsartan 26 mG 1 Tablet(s) Oral <User Schedule>  sertraline 25 milliGRAM(s) Oral daily  timolol 0.5% Solution 1 Drop(s) Both EYES two times a day    PRN MEDICATIONS  acetaminophen     Tablet .. 650 milliGRAM(s) Oral every 6 hours PRN    VITALS  T(F): 98.6 (07-25-24 @ 05:05), Max: 98.6 (07-25-24 @ 05:05)  HR: 60 (07-25-24 @ 14:45) (60 - 65)  BP: 107/56 (07-25-24 @ 14:45) (107/56 - 137/75)  RR: 18 (07-25-24 @ 14:45) (18 - 18)  SpO2: 99% (07-25-24 @ 14:45) (97% - 99%)    PHYSICAL EXAM  GENERAL  (x  ) NAD, lying in bed comfortably     (  ) obtunded     (  ) lethargic     (  ) somnolent    HEAD  ( x ) Atraumatic     (  ) hematoma     (  ) laceration (specify location:       )     NECK  (x  ) Supple     (  ) neck stiffness     (  ) nuchal rigidity     (  )  no JVD     (  ) JVD present ( -- cm)    HEART  Rate -->  ( x ) normal rate    (  ) bradycardic    (  ) tachycardic  Rhythm -->  (  ) regular    (  ) regularly irregular    (x  ) irregularly irregular  Murmurs -->  (  x) normal s1/s2    (  ) systolic murmur    (  ) diastolic murmur    (  ) continuous murmur     (  ) S3 present    (  ) S4 present    LUNGS  (x  )Unlabored respirations     (  ) tachypnea  (x  ) B/L air entry     (  ) decreased breath sounds in:  (location     )    (  ) no adventitious sound     (  ) crackles     (x  ) wheezing      (  ) rhonchi      (specify location: L       )  (  ) chest wall tenderness (specify location:       )    ABDOMEN  ( x ) Soft     (  ) tense   |   ( x ) nondistended     (  ) distended   |   (  ) +BS     (  ) hypoactive bowel sounds     (  ) hyperactive bowel sounds  ( x ) nontender     (  ) RUQ tenderness     (  ) RLQ tenderness     (  ) LLQ tenderness     (  ) epigastric tenderness     (  ) diffuse tenderness  (  ) Splenomegaly      (  ) Hepatomegaly      (  ) Jaundice     (  ) ecchymosis     EXTREMITIES  ( x ) Normal     (  ) Rash     (  ) ecchymosis     (  ) varicose veins      (  ) pitting edema     (  ) non-pitting edema   (  ) ulceration     (  ) gangrene:     (location:     )    NERVOUS SYSTEM  (x  ) A&Ox3     (  ) confused     (  ) lethargic  CN II-XII:     (  ) Intact     (  ) focal deficits  (Specify:     )   Upper extremities:     (  ) strength X/5     (  ) focal deficit (specify:    )  Lower extremities:     (  ) strength  X/5    (  ) focal deficit (specify:    )    SKIN  ( x ) No rashes or lesions     (  ) maculopapular rash     (  ) pustules     (  ) vesicles     (  ) ulcer     (  ) ecchymosis     (specify location:     )    (  ) Indwelling Morgan Catheter   Date insterted:    Reason (  ) Critical illness     (  ) urinary retention    (  ) Accurate Ins/Outs Monitoring     (  ) CMO patient    (  ) Central Line  Date inserted:  Location: (  ) Right IJ   (  ) Left IJ   (  ) Right Fem   (  ) Left Fem    (  ) SPC  (  ) pigtail  (  ) PEG tube  (  ) colostomy  (  ) jejunostomy  (  ) U-Dall    LABS             10.0   6.31  )-----------( 212      ( 07-25-24 @ 05:46 )             31.1     130  |  94  |  34  -------------------------<  76   07-25-24 @ 05:46  4.3  |  24  |  3.7    Ca      8.1     07-25-24 @ 05:46  Phos   4.2     07-25-24 @ 05:46  Mg     2.8     07-25-24 @ 05:46    TPro  5.4  /  Alb  2.8  /  TBili  0.3  /  DBili  x   /  AST  27  /  ALT  13  /  AlkPhos  147  /  GGT  x     07-25-24 @ 05:46        Urinalysis Basic - ( 25 Jul 2024 05:46 )    Color: x / Appearance: x / SG: x / pH: x  Gluc: 76 mg/dL / Ketone: x  / Bili: x / Urobili: x   Blood: x / Protein: x / Nitrite: x   Leuk Esterase: x / RBC: x / WBC x   Sq Epi: x / Non Sq Epi: x / Bacteria: x          IMAGING

## 2024-07-25 NOTE — PROGRESS NOTE ADULT - ASSESSMENT
ASSESSMENT  88-year-old female (Amish) PMH HTN, DVT/PE 2018, NICM - HFrEF improved (LVEF 40-45% in 4/23) s/p AICD, RA, ESRD on HD TTS who was brought in from [facility] for SOB, admitted for possible PNA.    # PNA, viral , + parainfluenza  Rule out RA flare  Tm 100.8 P>90 RR>20 Hypertensive in the ER  WBC 12--> 5  BNP 38k    7/20 BCX NGTD     MRSA PCR Result.: Negative (07-22-24 @ 11:50)    Streptococcus pneumoniae Ag, Ur Result: Negative (07-22-24 @ 11:36)    Legionella Antigen, Urine: Negative (07-22-24 @ 11:36)    Procalcitonin: 0.23 (07-20-24 @ 14:32)- unremarkable   < from: Xray Chest 1 View- PORTABLE-Urgent (07.20.24 @ 09:42) >  Bibasilar opacities and effusions.    RECOMMENDATIONS  - monitor off antimicrobials   - Rule out RA flare  - Trend fever curve     If any questions, please text or call on Microsoft Teams  Please continue to update ID with any pertinent new clinical, laboratory or radiographic findings.

## 2024-07-25 NOTE — PROGRESS NOTE ADULT - ASSESSMENT
Ms. Marrufo is an 88-year-old female ([employer]) PMH HTN, DVT/PE 2018, NICM - HFrEF improved (LVEF 40-45% in 4/23) s/p AICD, RA, ESRD on HD TTS who was brought in from [facility] for SOB, admitted for possible RLL PNA.    Acute Hypoxemic Hypercapnic Respiratory Failure 2/2 CAP vs. HFrEF Exacerbation  -patient on Eliquis, doubt PE  -was on BiPAP at time of encounter  -pt coughing and SOB on presentation   -CXR: B/L opacities and effusions  Procal 0.23  MRSA neg   Trop downtrending   Plan:  - ID: change to PO levaquin 500mg post HD on HD days to complete 7 day end 7/26     -pt was febrile today so will send another Bx, sputum cx per ID   wean off BiPAP as tolerate  incentive spirometry  volume contraction with HD as tolerated  -BCx NGTD:      -f/u results   f/u BCx, sputum Cx, RVP, U strep & legionella  -f/u ID consult recs   c/w Lasix 40 mg IV BID  -Pt test positive for parainfluenza    #L hip pain  -pt noted a fall before coming to the hospital  -will get X'ray    ESRD on HD TTS  -HD per nephro    HTN  HFrEF dec- now 25% per echo 7/23  HO PE/DVT  Echo: (7/23)  1. Normal left ventricular internal cavity size.   2. Severely decreased global left ventricular systolic function.   3. Left ventricular ejection fraction, by visual estimation, is 25 to   30%.   4. Moderate concentric left ventricular hypertrophy.   5. Spectral Doppler shows pseudonormal pattern of left ventricular   myocardial filling (Grade II diastolic dysfunction).   6. Normal right ventricular size and function.   7. Severely enlarged left atrium.   8. Mild mitral valve regurgitation.   9. Moderate tricuspid regurgitation.  10. Estimated pulmonary artery systolic pressure is 52.2 mmHg assuming a   right atrial pressure of 15 mmHg, which is consistent with moderate   pulmonary hypertension.  11. Moderate pleural effusion in the left lateral region.  12. Dilatation of the ascending aorta.  13. Compared to study dated 4/9/24, the LVEF has decreased.    -c/w Entresto 24 mg qd (unclear why not on bid dosing)  -c/w amlodipine 5 mg qd  -c/w atorvastatin 20 mg qhs  -c/w metoprolol ER 50 mg qd  -c/w Eliquis 2.5 mg bid  -f/u cardio recs  given dec EF and inc trop     RA  -OP f/u    DVT prophylaxis: Eliquis  GI prophylaxis: PPI  Diet: renal  Code status: DNR/DNI   Activity: IAT PT  Dispo: c/w antibx,    Ms. Marrufo is an 88-year-old female ([employer]) PMH HTN, DVT/PE 2018, NICM - HFrEF improved (LVEF 40-45% in 4/23) s/p AICD, RA, ESRD on HD TTS who was brought in from [facility] for SOB, admitted for possible RLL PNA.    Acute Hypoxemic Hypercapnic Respiratory Failure 2/2 CAP vs. HFrEF Exacerbation  -patient on Eliquis, doubt PE  -pt coughing and SOB on presentation   -CXR: B/L opacities and effusions  Procal 0.23  MRSA neg   Trop downtrending   parainfluenza +  legionella neg  Plan:  D/c antibx per ID   incentive spirometry  volume contraction with HD as tolerated  -BCx NGTD:      -f/u final results   f/u BCx, sputum Cx  -f/u ID consult recs   c/w Lasix 40 mg IV BID      #L hip pain  -possible RA flare vs osteoarthritis   CRP 70.5, ESR  Hip X-ray: Chronic left proximal femur intertrochanteric fracture deformity with gamma nail without evidence of hardware complication. Moderate/severe degenerative changes of the left hip. Moderate   degenerative change of the right hip.  -pt noted a fall before coming to the hospital      ESRD on HD TTS  -HD per nephro    HTN  HFrEF dec- now 25% per echo 7/23  HO PE/DVT  Echo: (7/23)  1. Normal left ventricular internal cavity size.   2. Severely decreased global left ventricular systolic function.   3. Left ventricular ejection fraction, by visual estimation, is 25 to   30%.   4. Moderate concentric left ventricular hypertrophy.   5. Spectral Doppler shows pseudonormal pattern of left ventricular   myocardial filling (Grade II diastolic dysfunction).   6. Normal right ventricular size and function.   7. Severely enlarged left atrium.   8. Mild mitral valve regurgitation.   9. Moderate tricuspid regurgitation.  10. Estimated pulmonary artery systolic pressure is 52.2 mmHg assuming a   right atrial pressure of 15 mmHg, which is consistent with moderate   pulmonary hypertension.  11. Moderate pleural effusion in the left lateral region.  12. Dilatation of the ascending aorta.  13. Compared to study dated 4/9/24, the LVEF has decreased.    -c/w Entresto 24 mg qd (unclear why not on bid dosing)  -c/w amlodipine 5 mg qd  -c/w atorvastatin 20 mg qhs  -c/w metoprolol ER 50 mg qd  -c/w Eliquis 2.5 mg bid  -f/u cardio recs  given dec EF and inc trop     RA  -OP f/u    DVT prophylaxis: Eliquis  GI prophylaxis: PPI  Diet: renal  Code status: DNR/DNI   Activity: IAT PT  Dispo: c/w antibx,    Ms. Marrufo is an 88-year-old female (Sikhism) PMH HTN, DVT/PE 2018, NICM - HFrEF improved (LVEF 40-45% in 4/23) s/p AICD, RA, ESRD on HD TTS who was brought in from [facility] for SOB, admitted for possible RLL PNA.    Acute Hypoxemic Hypercapnic Respiratory Failure 2/2 CAP vs. HFrEF Exacerbation  -patient on Eliquis, doubt PE  -pt coughing and SOB on presentation   -CXR: B/L opacities and effusions  Procal 0.23  MRSA neg   Trop downtrending   parainfluenza +  legionella neg  Plan:  D/c antibx per ID   incentive spirometry  volume contraction with HD as tolerated  -BCx NGTD:      -f/u final results   f/u BCx, sputum Cx  -f/u ID consult recs   c/w Lasix 40 mg IV BID      #L hip pain  -possible RA flare vs osteoarthritis   CRP 70.5,   Hip X-ray: Chronic left proximal femur intertrochanteric fracture deformity with gamma nail without evidence of hardware complication. Moderate/severe degenerative changes of the left hip. Moderate   degenerative change of the right hip.  -pt noted a fall before coming to the hospital  -F/u RF, CC    ESRD on HD TTS  -HD per nephro    HTN  HFrEF dec- now 25% per echo 7/23  HO PE/DVT  -pt had runs of NSVT on tele   Echo: (7/23)  1. Normal left ventricular internal cavity size.   2. Severely decreased global left ventricular systolic function.   3. Left ventricular ejection fraction, by visual estimation, is 25 to   30%.   4. Moderate concentric left ventricular hypertrophy.   5. Spectral Doppler shows pseudonormal pattern of left ventricular   myocardial filling (Grade II diastolic dysfunction).   6. Normal right ventricular size and function.   7. Severely enlarged left atrium.   8. Mild mitral valve regurgitation.   9. Moderate tricuspid regurgitation.  10. Estimated pulmonary artery systolic pressure is 52.2 mmHg assuming a   right atrial pressure of 15 mmHg, which is consistent with moderate   pulmonary hypertension.  11. Moderate pleural effusion in the left lateral region.  12. Dilatation of the ascending aorta.  13. Compared to study dated 4/9/24, the LVEF has decreased.    -c/w Entresto 24 mg qd (unclear why not on bid dosing)  -c/w amlodipine 5 mg qd  -c/w atorvastatin 20 mg qhs  -c/w metoprolol ER 50 mg qd  -c/w Eliquis 2.5 mg bid  - cardio recs  given dec EF and inc trop:  - Up titrate Metoprolol succinate to 150 mg from 100 mg as needed  - Outpatient follow up with Cardiology   - Continue with IV lasix 40 mg BID for 1 more day then transition to PO         RA  -OP f/u    DVT prophylaxis: Eliquis  GI prophylaxis: PPI  Diet: renal  Code status: DNR/DNI   Activity: IAT PT  Dispo: c/w antibx,

## 2024-07-25 NOTE — PROGRESS NOTE ADULT - SUBJECTIVE AND OBJECTIVE BOX
RACHEL SUMMERS  88y, Female  Allergy: cephalosporins (Angioedema)  Keflex (Swelling)      LOS  5d    CHIEF COMPLAINT: Pneumonia (25 Jul 2024 14:05)      INTERVAL EVENTS/HPI  - No acute events overnight- parainfluenza +   - T(F): , Max: 98.6 (07-25-24 @ 05:05)  - Denies any worsening symptoms  - Tolerating medication  - WBC Count: 6.31 (07-25-24 @ 05:46)  WBC Count: 5.59 (07-24-24 @ 06:48)     - Creatinine: 3.7 (07-25-24 @ 05:46)  Creatinine: 2.8 (07-24-24 @ 06:48)       ROS  General: Denies rigors, nightsweats  HEENT: Denies headache, rhinorrhea, sore throat, eye pain  CV: Denies CP, palpitations  PULM: Denies wheezing, hemoptysis  GI: Denies hematemesis, hematochezia, melena  : Denies discharge, hematuria  MSK: Denies arthralgias, myalgias  SKIN: Denies rash, lesions  NEURO: Denies paresthesias, weakness  PSYCH: Denies depression, anxiety    VITALS:  T(F): 98.6, Max: 98.6 (07-25-24 @ 05:05)  HR: 60  BP: 113/55  RR: 18Vital Signs Last 24 Hrs  T(C): 37 (25 Jul 2024 05:05), Max: 37 (25 Jul 2024 05:05)  T(F): 98.6 (25 Jul 2024 05:05), Max: 98.6 (25 Jul 2024 05:05)  HR: 60 (25 Jul 2024 11:45) (60 - 65)  BP: 113/55 (25 Jul 2024 11:45) (113/55 - 137/75)  BP(mean): --  RR: 18 (25 Jul 2024 11:45) (18 - 18)  SpO2: 99% (25 Jul 2024 11:45) (97% - 99%)    Parameters below as of 25 Jul 2024 11:45  Patient On (Oxygen Delivery Method): nasal cannula        PHYSICAL EXAM:  Gen: NAD, resting in bed chronically ill appearing   HEENT: Normocephalic, atraumatic  Neck: supple, no lymphadenopathy  CV: Regular rate & regular rhythm  Lungs: decreased BS at bases, no fremitus  Abdomen: Soft, BS present  Ext: Warm, well perfused  MSK L knee tender  Neuro: non focal, awake  Skin: no rash, no erythema  Lines: no phlebitis    FH: Non-contributory  Social Hx: Non-contributory    TESTS & MEASUREMENTS:                        10.0   6.31  )-----------( 212      ( 25 Jul 2024 05:46 )             31.1     07-25    130<L>  |  94<L>  |  34<H>  ----------------------------<  76  4.3   |  24  |  3.7<H>    Ca    8.1<L>      25 Jul 2024 05:46  Phos  4.2     07-25  Mg     2.8     07-25    TPro  5.4<L>  /  Alb  2.8<L>  /  TBili  0.3  /  DBili  x   /  AST  27  /  ALT  13  /  AlkPhos  147<H>  07-25      LIVER FUNCTIONS - ( 25 Jul 2024 05:46 )  Alb: 2.8 g/dL / Pro: 5.4 g/dL / ALK PHOS: 147 U/L / ALT: 13 U/L / AST: 27 U/L / GGT: x           Urinalysis Basic - ( 25 Jul 2024 05:46 )    Color: x / Appearance: x / SG: x / pH: x  Gluc: 76 mg/dL / Ketone: x  / Bili: x / Urobili: x   Blood: x / Protein: x / Nitrite: x   Leuk Esterase: x / RBC: x / WBC x   Sq Epi: x / Non Sq Epi: x / Bacteria: x        Culture - Blood (collected 07-20-24 @ 11:31)  Source: .Blood Blood  Preliminary Report (07-24-24 @ 22:01):    No growth at 4 days    Culture - Blood (collected 07-20-24 @ 11:31)  Source: .Blood Blood  Preliminary Report (07-24-24 @ 22:01):    No growth at 4 days            INFECTIOUS DISEASES TESTING  Rapid RVP Result: Detected (07-23-24 @ 23:38)  MRSA PCR Result.: Negative (07-22-24 @ 11:50)  Streptococcus pneumoniae Ag, Ur Result: Negative (07-22-24 @ 11:36)  Legionella Antigen, Urine: Negative (07-22-24 @ 11:36)  Procalcitonin: 0.23 (07-20-24 @ 14:32)  Rapid RVP Result: NotDetec (04-08-24 @ 00:15)  Hepatitis C Virus Interpretation Result: Nonreact (04-01-24 @ 15:52)  Procalcitonin, Serum: 0.70 (03-30-24 @ 09:03)  Rapid RVP Result: NotDetec (03-27-24 @ 12:02)  Procalcitonin, Serum: 0.13 (03-23-24 @ 16:00)      INFLAMMATORY MARKERS  C-Reactive Protein: 70.5 mg/L (07-25-24 @ 09:20)  Sedimentation Rate, Erythrocyte: 105 mm/Hr (07-25-24 @ 09:20)      RADIOLOGY & ADDITIONAL TESTS:  I have personally reviewed the last available Chest xray  CXR      CT      CARDIOLOGY TESTING  12 Lead ECG:   Ventricular Rate 82 BPM    Atrial Rate 82 BPM    P-R Interval 148 ms    QRS Duration 134 ms    Q-T Interval 470 ms    QTC Calculation(Bazett) 549 ms    P Axis 0 degrees    R Axis -21 degrees    T Axis 161 degrees    Diagnosis Line Normal sinus rhythm  Non-specific intra-ventricular conduction block  Inferior infarct , age undetermined  Possible Anterolateral infarct , age undetermined  Abnormal ECG    Confirmed by Karina Hood ([id number]) on 7/21/2024 10:02:32 AM (07-21-24 @ 02:52)  12 Lead ECG:   Ventricular Rate 92 BPM    Atrial Rate 92 BPM    P-R Interval 154 ms    QRS Duration 142 ms    Q-T Interval 398 ms    QTC Calculation(Bazett) 492 ms    P Axis 51 degrees    R Axis -18 degrees    T Axis 140 degrees    Diagnosis Line Sinus rhythm with Premature atrial complexes  Left bundle branch block  Abnormal ECG  When compared with ECG of 08-APR-2024 02:18,  Premature atrial complexes are now Present  Confirmed by Karina Hood ([id number]) on 7/20/2024 2:07:16 PM (07-20-24 @ 09:03)      MEDICATIONS  amLODIPine   Tablet 5 Oral daily  apixaban 2.5 Oral two times a day  atorvastatin 20 Oral at bedtime  chlorhexidine 2% Cloths 1 Topical daily  folic acid 1 Oral daily  furosemide   Injectable 40 IV Push two times a day  metoprolol succinate  Oral daily  pantoprazole    Tablet 40 Oral before breakfast  sacubitril 24 mG/valsartan 26 mG 1 Oral <User Schedule>  sertraline 25 Oral daily  timolol 0.5% Solution 1 Both EYES two times a day      WEIGHT  Weight (kg): 56.7 (07-20-24 @ 08:59)  Creatinine: 3.7 mg/dL (07-25-24 @ 05:46)      ANTIBIOTICS:      All available historical records have been reviewed

## 2024-07-25 NOTE — PROGRESS NOTE ADULT - ASSESSMENT
88-year-old female ([employer]) PMH HTN, DVT/PE 2018, NICM - HFrEF improved (LVEF 40-45% in 4/23) s/p AICD, RA, ESRD on HD TTS who was brought in from [facility] for SOB.  # ESRD on HD T TH Sat   # SOB volume overload? CHF   -for HD  today 3 k  bath uf 2 liters as tolerated   - increase uf goals with hd   - d/c amlodipine if bp remains on the low side   - IP at target  no binders   - cardiology notes appreciated   - h/h noted / no need for ELIJAH   - change lasix to bumex po 2 q 12  if non oliguric / if oliguric  can d/c   will follow

## 2024-07-25 NOTE — PROGRESS NOTE ADULT - ASSESSMENT
87 yo F [employer] PMHxHTN, DVT/PE 2018, NICM chronic with improved (LVEF 40-45% in 4/23) s/p AICD, RA, ESRD on HD TTS who was brought in from [facility] for SOB, admitted for possible RLL PNA.    Acute Hypoxemic Hypercapnic Respiratory Failure:   Acute HFrEF   -possible pneumonia due to parainfluenza virus--supportive care  -Non ischemic Cardiomyopathy: s/p AICD  -Patient with SOB, cough,  -CXR showed bilateral lower lung opacities and effusion.   -Started on Lasix 40mg IV BID (still making a little urine).   -Continue Entresto and Metoprolol.   -Patient with cough, leukocytosis, could be pneumonia, Nasal MRSA negative  -ID recommended Levaquin 500mg post HD for 5 days, ends today on 7/24.   -s/p BiPAP, incentive spirometry  -echo shows reduction in EF to 25%. Cardio follow up requested:   - Continue with GDMT   - Up titrate Metoprolol succinate to 150 mg from 100 mg as needed  - Outpatient follow up with Cardiology   - Continue with IV lasix 40 mg BID for 1 more day then transition to PO    HIp and knee pain  - ID recommended w/u for RA flare  - pt reports she fell on this side 2 weeks ago  - xray done, pending read. Upon independent review there does not appear to be any frctures. Awaiting official read  - ESR/CRP elevated, pending RF/ ccp  - tylenol dose increased  - no evidence of septic joint    ESRD on HD TTS  Continue HD.     HTN  Continue Entresto, Amlodipine and Metoprolol.     history PE/DVT  RA    DVT prophylaxis: Eliquis  GI prophylaxis: PPI    Code status: DNR/DNI     #Progress Note Handoff:  Pending (specify):  ID follow up, xray read  Family discussion:   Disposition: from SNF, likely dc in AM

## 2024-07-25 NOTE — PROGRESS NOTE ADULT - NS ATTEST RISK PROBLEM GEN_ALL_CORE FT
One acute illness with systemic symptoms- rule out RA flare, fever,   - I discussed my recommendations with the primary team housestaff / Attending

## 2024-07-26 LAB
ALBUMIN SERPL ELPH-MCNC: 2.9 G/DL — LOW (ref 3.5–5.2)
ALP SERPL-CCNC: 152 U/L — HIGH (ref 30–115)
ALT FLD-CCNC: 14 U/L — SIGNIFICANT CHANGE UP (ref 0–41)
ANION GAP SERPL CALC-SCNC: 9 MMOL/L — SIGNIFICANT CHANGE UP (ref 7–14)
AST SERPL-CCNC: 31 U/L — SIGNIFICANT CHANGE UP (ref 0–41)
BASOPHILS # BLD AUTO: 0.04 K/UL — SIGNIFICANT CHANGE UP (ref 0–0.2)
BASOPHILS NFR BLD AUTO: 0.6 % — SIGNIFICANT CHANGE UP (ref 0–1)
BILIRUB SERPL-MCNC: 0.4 MG/DL — SIGNIFICANT CHANGE UP (ref 0.2–1.2)
BUN SERPL-MCNC: 18 MG/DL — SIGNIFICANT CHANGE UP (ref 10–20)
CALCIUM SERPL-MCNC: 8.2 MG/DL — LOW (ref 8.4–10.5)
CHLORIDE SERPL-SCNC: 101 MMOL/L — SIGNIFICANT CHANGE UP (ref 98–110)
CO2 SERPL-SCNC: 27 MMOL/L — SIGNIFICANT CHANGE UP (ref 17–32)
CREAT SERPL-MCNC: 2.6 MG/DL — HIGH (ref 0.7–1.5)
EGFR: 17 ML/MIN/1.73M2 — LOW
EOSINOPHIL # BLD AUTO: 0.63 K/UL — SIGNIFICANT CHANGE UP (ref 0–0.7)
EOSINOPHIL NFR BLD AUTO: 8.9 % — HIGH (ref 0–8)
GLUCOSE SERPL-MCNC: 88 MG/DL — SIGNIFICANT CHANGE UP (ref 70–99)
HCT VFR BLD CALC: 32.9 % — LOW (ref 37–47)
HGB BLD-MCNC: 10.4 G/DL — LOW (ref 12–16)
IMM GRANULOCYTES NFR BLD AUTO: 0.6 % — HIGH (ref 0.1–0.3)
LYMPHOCYTES # BLD AUTO: 2.03 K/UL — SIGNIFICANT CHANGE UP (ref 1.2–3.4)
LYMPHOCYTES # BLD AUTO: 28.7 % — SIGNIFICANT CHANGE UP (ref 20.5–51.1)
MAGNESIUM SERPL-MCNC: 2.2 MG/DL — SIGNIFICANT CHANGE UP (ref 1.8–2.4)
MCHC RBC-ENTMCNC: 31 PG — SIGNIFICANT CHANGE UP (ref 27–31)
MCHC RBC-ENTMCNC: 31.6 G/DL — LOW (ref 32–37)
MCV RBC AUTO: 98.2 FL — SIGNIFICANT CHANGE UP (ref 81–99)
MONOCYTES # BLD AUTO: 1.1 K/UL — HIGH (ref 0.1–0.6)
MONOCYTES NFR BLD AUTO: 15.5 % — HIGH (ref 1.7–9.3)
NEUTROPHILS # BLD AUTO: 3.24 K/UL — SIGNIFICANT CHANGE UP (ref 1.4–6.5)
NEUTROPHILS NFR BLD AUTO: 45.7 % — SIGNIFICANT CHANGE UP (ref 42.2–75.2)
NRBC # BLD: 0 /100 WBCS — SIGNIFICANT CHANGE UP (ref 0–0)
PHOSPHATE SERPL-MCNC: 3.5 MG/DL — SIGNIFICANT CHANGE UP (ref 2.1–4.9)
PLATELET # BLD AUTO: 237 K/UL — SIGNIFICANT CHANGE UP (ref 130–400)
PMV BLD: 9.5 FL — SIGNIFICANT CHANGE UP (ref 7.4–10.4)
POTASSIUM SERPL-MCNC: 4 MMOL/L — SIGNIFICANT CHANGE UP (ref 3.5–5)
POTASSIUM SERPL-SCNC: 4 MMOL/L — SIGNIFICANT CHANGE UP (ref 3.5–5)
PROT SERPL-MCNC: 5.7 G/DL — LOW (ref 6–8)
RBC # BLD: 3.35 M/UL — LOW (ref 4.2–5.4)
RBC # FLD: 14.4 % — SIGNIFICANT CHANGE UP (ref 11.5–14.5)
SODIUM SERPL-SCNC: 137 MMOL/L — SIGNIFICANT CHANGE UP (ref 135–146)
WBC # BLD: 7.08 K/UL — SIGNIFICANT CHANGE UP (ref 4.8–10.8)
WBC # FLD AUTO: 7.08 K/UL — SIGNIFICANT CHANGE UP (ref 4.8–10.8)

## 2024-07-26 PROCEDURE — 99232 SBSQ HOSP IP/OBS MODERATE 35: CPT

## 2024-07-26 RX ORDER — METHYLPREDNISOLONE ACETATE 40 MG/ML
40 INJECTION, SUSPENSION INTRA-ARTICULAR; INTRALESIONAL; INTRAMUSCULAR; INTRASYNOVIAL; SOFT TISSUE ONCE
Refills: 0 | Status: DISCONTINUED | OUTPATIENT
Start: 2024-07-26 | End: 2024-07-26

## 2024-07-26 RX ORDER — PREDNISONE 10 MG/1
40 TABLET ORAL ONCE
Refills: 0 | Status: COMPLETED | OUTPATIENT
Start: 2024-07-26 | End: 2024-07-26

## 2024-07-26 RX ORDER — FUROSEMIDE 10 MG/ML
40 INJECTION, SOLUTION INTRAVENOUS
Refills: 0 | Status: DISCONTINUED | OUTPATIENT
Start: 2024-07-26 | End: 2024-08-01

## 2024-07-26 RX ADMIN — Medication 650 MILLIGRAM(S): at 11:04

## 2024-07-26 RX ADMIN — AMLODIPINE BESYLATE 5 MILLIGRAM(S): 2.5 TABLET ORAL at 05:47

## 2024-07-26 RX ADMIN — SERTRALINE HYDROCHLORIDE 25 MILLIGRAM(S): 100 TABLET, FILM COATED ORAL at 12:19

## 2024-07-26 RX ADMIN — Medication 650 MILLIGRAM(S): at 21:45

## 2024-07-26 RX ADMIN — Medication 1 MILLIGRAM(S): at 12:16

## 2024-07-26 RX ADMIN — TIMOLOL MALEATE 1 DROP(S): 2.5 SOLUTION/ DROPS OPHTHALMIC at 05:47

## 2024-07-26 RX ADMIN — PANTOPRAZOLE SODIUM 40 MILLIGRAM(S): 20 TABLET, DELAYED RELEASE ORAL at 05:46

## 2024-07-26 RX ADMIN — Medication 650 MILLIGRAM(S): at 21:15

## 2024-07-26 RX ADMIN — SACUBITRIL AND VALSARTAN 1 TABLET(S): 49; 51 TABLET, FILM COATED ORAL at 05:46

## 2024-07-26 RX ADMIN — CHLORHEXIDINE GLUCONATE 1 APPLICATION(S): 500 CLOTH TOPICAL at 12:20

## 2024-07-26 RX ADMIN — PREDNISONE 40 MILLIGRAM(S): 10 TABLET ORAL at 17:48

## 2024-07-26 RX ADMIN — Medication 650 MILLIGRAM(S): at 10:34

## 2024-07-26 RX ADMIN — ATORVASTATIN CALCIUM 20 MILLIGRAM(S): 40 TABLET, FILM COATED ORAL at 21:15

## 2024-07-26 RX ADMIN — APIXABAN 2.5 MILLIGRAM(S): 5 TABLET, FILM COATED ORAL at 17:48

## 2024-07-26 RX ADMIN — Medication 100 MILLIGRAM(S): at 05:46

## 2024-07-26 RX ADMIN — FUROSEMIDE 40 MILLIGRAM(S): 10 INJECTION, SOLUTION INTRAVENOUS at 14:50

## 2024-07-26 RX ADMIN — FUROSEMIDE 40 MILLIGRAM(S): 10 INJECTION, SOLUTION INTRAVENOUS at 05:47

## 2024-07-26 RX ADMIN — TIMOLOL MALEATE 1 DROP(S): 2.5 SOLUTION/ DROPS OPHTHALMIC at 17:48

## 2024-07-26 RX ADMIN — APIXABAN 2.5 MILLIGRAM(S): 5 TABLET, FILM COATED ORAL at 05:46

## 2024-07-26 NOTE — PROGRESS NOTE ADULT - ASSESSMENT
Ms. Marrufo is an 88-year-old female ([employer]ab[employer]) PMH HTN, DVT/PE 2018, NICM - HFrEF improved (LVEF 40-45% in 4/23) s/p AICD, RA, ESRD on HD TTS who was brought in from [facility] for SOB, admitted for possible RLL PNA.    Acute Hypoxemic Hypercapnic Respiratory Failure 2/2 CAP vs. HFrEF Exacerbation  -patient on Eliquis, doubt PE  -pt coughing and SOB on presentation   -CXR: B/L opacities and effusions  - Procal 0.23  - MRSA neg   - Trop downtrending   - parainfluenza +  - legionella neg  -BCx NGTD  -monitoring off antibiotics   - incentive spirometry    #ESRD on HD TTS  -HD per nephro    #HTN  #HFrEF dec- now 25% per echo 7/23  #HO PE/DVT  -pt had runs of NSVT on tele   Echo: (7/23)  1. Normal left ventricular internal cavity size.   2. Severely decreased global left ventricular systolic function.   3. Left ventricular ejection fraction, by visual estimation, is 25 to   30%.   4. Moderate concentric left ventricular hypertrophy.   5. Spectral Doppler shows pseudonormal pattern of left ventricular   myocardial filling (Grade II diastolic dysfunction).   6. Normal right ventricular size and function.   7. Severely enlarged left atrium.   8. Mild mitral valve regurgitation.   9. Moderate tricuspid regurgitation.  10. Estimated pulmonary artery systolic pressure is 52.2 mmHg assuming a   right atrial pressure of 15 mmHg, which is consistent with moderate   pulmonary hypertension.  11. Moderate pleural effusion in the left lateral region.  12. Dilatation of the ascending aorta.  13. Compared to study dated 4/9/24, the LVEF has decreased.    -c/w Entresto 24 mg qd (unclear why not on bid dosing)  -c/w amlodipine 5 mg qd  -c/w atorvastatin 20 mg qhs  -c/w metoprolol ER 50 mg qd  -c/w Eliquis 2.5 mg bid  - cardio recs  given dec EF and inc trop:  - Up titrate Metoprolol succinate to 150 mg from 100 mg as needed  - Outpatient follow up with Cardiology   - transitioning to PO lasix 40mg BID today per cardio     # Possible RA flair vs osteoarthritis vs pain 2/2 previous trauma   - Hip X-ray: Chronic left proximal femur intertrochanteric fracture deformity with gamma nail without evidence of hardware complication. Moderate/severe degenerative changes of the left hip. Moderate   degenerative change of the right hip.  - CRP 70.5,   - pt noted a fall before coming to the hospital  - pt reporting knee pain  - RF elevated 22  - will give dose 40mg IVmethylprednisolone once and reassess   - OP f/u    DVT prophylaxis: Eliquis  GI prophylaxis: PPI  Diet: renal  Code status: DNR/DNI   Activity: IAT PT  Dispo: monitoring off abx, steroids for RA flare?, PO lasix

## 2024-07-26 NOTE — PROGRESS NOTE ADULT - SUBJECTIVE AND OBJECTIVE BOX
SUBJECTIVE/OVERNIGHT EVENTS  Today is hospital day 6d. This morning patient was seen and examined at bedside, resting comfortably in bed. No acute or major events overnight. Complaining of a headache today and pain in the back of the neck. No changes in vision or other neuro changes. The pt has very poor posturing, neck strained and tender to my palpation; headache likely in the setting of MSK strain.    PMH    Pneumonia due to infectious organism    No pertinent family history in first degree relatives    No pertinent family history in first degree relatives    FH: arthritis    Handoff    MEWS Score    Prior    Chronic kidney disease    Pacemaker    Hypertension    Gout    Diverticulitis    Atrial fibrillation    Diastolic heart failure    Rheumatoid arthritis    DVT, lower extremity    Pulmonary embolism    Chronic HFrEF (heart failure with reduced ejection fraction)    AICD (automatic cardioverter/defibrillator) present    ESRD on dialysis    RLL pneumonia    Artificial pacemaker    History of appendectomy    History of tonsillectomy    History of hysterectomy    H/O hernia repair    CHEST PAIN    90+    Elevated troponin    SOB (shortness of breath)    ESRD on dialysis        MEDICATIONS  STANDING MEDICATIONS  amLODIPine   Tablet 5 milliGRAM(s) Oral daily  apixaban 2.5 milliGRAM(s) Oral two times a day  atorvastatin 20 milliGRAM(s) Oral at bedtime  chlorhexidine 2% Cloths 1 Application(s) Topical daily  folic acid 1 milliGRAM(s) Oral daily  furosemide   Injectable 40 milliGRAM(s) IV Push two times a day  metoprolol succinate  milliGRAM(s) Oral daily  pantoprazole    Tablet 40 milliGRAM(s) Oral before breakfast  sacubitril 24 mG/valsartan 26 mG 1 Tablet(s) Oral <User Schedule>  sertraline 25 milliGRAM(s) Oral daily  timolol 0.5% Solution 1 Drop(s) Both EYES two times a day    PRN MEDICATIONS  acetaminophen     Tablet .. 650 milliGRAM(s) Oral every 6 hours PRN    VITALS  T(F): 97.8 (07-26-24 @ 12:37), Max: 98.7 (07-26-24 @ 04:44)  HR: 88 (07-26-24 @ 12:37) (62 - 88)  BP: 122/64 (07-26-24 @ 12:37) (122/64 - 132/63)  RR: 18 (07-26-24 @ 12:37) (18 - 18)  SpO2: --    PHYSICAL EXAM  GENERAL  ( x ) NAD, lying in bed comfortably     (  ) obtunded     (  ) lethargic     (  ) somnolent    HEAD  (x  ) Atraumatic     (  ) hematoma     (  ) laceration (specify location:       )     NECK - MSK complaint - pt w poor posturing putting strain on back of neck - tender to palpation  (  ) Supple     (  ) neck stiffness     (  ) nuchal rigidity     (  )  no JVD     (  ) JVD present ( -- cm)    HEART  Rate -->  ( x ) normal rate    (  ) bradycardic    (  ) tachycardic  Rhythm -->  (  x) regular    (  ) regularly irregular    (  ) irregularly irregular  Murmurs -->  (  ) normal s1/s2    (  ) systolic murmur    (  ) diastolic murmur    (  ) continuous murmur     (  ) S3 present    (  ) S4 present    LUNGS  ( x )Unlabored respirations     (  ) tachypnea  (  ) B/L air entry     (  ) decreased breath sounds in:  (location     )    (  ) no adventitious sound     (  ) crackles     (  ) wheezing      (  ) rhonchi      (specify location:       )  (  ) chest wall tenderness (specify location:       )    ABDOMEN  (x  ) Soft     (  ) tense   |   (  ) nondistended     (  ) distended   |   (  ) +BS     (  ) hypoactive bowel sounds     (  ) hyperactive bowel sounds  (  ) nontender     (  ) RUQ tenderness     (  ) RLQ tenderness     (  ) LLQ tenderness     (  ) epigastric tenderness     (  ) diffuse tenderness  (  ) Splenomegaly      (  ) Hepatomegaly      (  ) Jaundice     (  ) ecchymosis     EXTREMITIES  ( x ) Normal     (  ) Rash     (  ) ecchymosis     (  ) varicose veins      (  ) pitting edema     (  ) non-pitting edema   (  ) ulceration     (  ) gangrene:     (location:     )    NERVOUS SYSTEM  ( x ) A&Ox3     (  ) confused     (  ) lethargic  CN II-XII:     (  ) Intact     (  ) focal deficits  (Specify:     )   Upper extremities:     (  ) strength X/5     (  ) focal deficit (specify:    )  Lower extremities:     (  ) strength  X/5    (  ) focal deficit (specify:    )    SKIN  ( x ) No rashes or lesions     (  ) maculopapular rash     (  ) pustules     (  ) vesicles     (  ) ulcer     (  ) ecchymosis     (specify location:     )      _HD port         LABS             10.4   7.08  )-----------( 237      ( 07-26-24 @ 07:26 )             32.9     137  |  101  |  18  -------------------------<  88   07-26-24 @ 07:26  4.0  |  27  |  2.6    Ca      8.2     07-26-24 @ 07:26  Phos   3.5     07-26-24 @ 07:26  Mg     2.2     07-26-24 @ 07:26    TPro  5.7  /  Alb  2.9  /  TBili  0.4  /  DBili  x   /  AST  31  /  ALT  14  /  AlkPhos  152  /  GGT  x     07-26-24 @ 07:26        Urinalysis Basic - ( 26 Jul 2024 07:26 )    Color: x / Appearance: x / SG: x / pH: x  Gluc: 88 mg/dL / Ketone: x  / Bili: x / Urobili: x   Blood: x / Protein: x / Nitrite: x   Leuk Esterase: x / RBC: x / WBC x   Sq Epi: x / Non Sq Epi: x / Bacteria: x          Culture - Blood (collected 24 Jul 2024 11:41)  Source: .Blood None  Preliminary Report (25 Jul 2024 21:01):    No growth at 24 hours      IMAGING    General:  - DVT proph  - GI proph  - Diet  - Activity  - Code Status

## 2024-07-26 NOTE — PROGRESS NOTE ADULT - SUBJECTIVE AND OBJECTIVE BOX
CHIEF COMPLAINT:    Patient is a 88y old  Female who presents with a chief complaint of Pneumonia     INTERVAL HPI/OVERNIGHT EVENTS:    Patient seen and examined at bedside. No acute overnight events occurred.    ROS: Denies SOB, chest pain. Reports joint pain and neck pain. All other systems are negative.    Medications:  Standing  amLODIPine   Tablet 5 milliGRAM(s) Oral daily  apixaban 2.5 milliGRAM(s) Oral two times a day  atorvastatin 20 milliGRAM(s) Oral at bedtime  chlorhexidine 2% Cloths 1 Application(s) Topical daily  folic acid 1 milliGRAM(s) Oral daily  furosemide    Tablet 40 milliGRAM(s) Oral two times a day  methylPREDNISolone sodium succinate Injectable 40 milliGRAM(s) IV Push once  metoprolol succinate  milliGRAM(s) Oral daily  pantoprazole    Tablet 40 milliGRAM(s) Oral before breakfast  sacubitril 24 mG/valsartan 26 mG 1 Tablet(s) Oral <User Schedule>  sertraline 25 milliGRAM(s) Oral daily  timolol 0.5% Solution 1 Drop(s) Both EYES two times a day    PRN Meds  acetaminophen     Tablet .. 650 milliGRAM(s) Oral every 6 hours PRN        Vital Signs:    T(F): 97.8 (24 @ 12:37), Max: 98.7 (24 @ 04:44)  HR: 88 (24 @ 12:37) (62 - 88)  BP: 122/64 (24 @ 12:37) (122/64 - 132/63)  RR: 18 (24 @ 12:37) (18 - 18)  SpO2: --  I&O's Summary    2024 07:  -  2024 07:00  --------------------------------------------------------  IN: 458 mL / OUT: 2250 mL / NET: -1792 mL    2024 07:  -  2024 16:20  --------------------------------------------------------  IN: 236 mL / OUT: 0 mL / NET: 236 mL      Daily     Daily Weight in k (2024 04:44)  CAPILLARY BLOOD GLUCOSE          PHYSICAL EXAM:  GENERAL:  NAD  SKIN: No rashes or lesions  HEENT: Atraumatic. Normocephalic. Anicteric  NECK:  No JVD.   PULMONARY: Clear to ausculation bilaterally. No wheezing. No rales  CVS: Normal S1, S2. Regular rate and rhythm. No murmurs.  ABDOMEN/GI: Soft, Nontender, Nondistended; Bowel sounds are present  EXTREMITIES:  No edema B/L LE.  NEUROLOGIC:  No motor deficit.  PSYCH: Alert & oriented x 3, normal affect      LABS:                        10.4   7.08  )-----------( 237      ( 2024 07:26 )             32.9     07-26    137  |  101  |  18  ----------------------------<  88  4.0   |  27  |  2.6<H>    Ca    8.2<L>      2024 07:26  Phos  3.5     07-26  Mg     2.2     07-26    TPro  5.7<L>  /  Alb  2.9<L>  /  TBili  0.4  /  DBili  x   /  AST  31  /  ALT  14  /  AlkPhos  152<H>  07-26            Culture - Blood (collected 2024 11:41)  Source: .Blood None  Preliminary Report (2024 21:01):    No growth at 24 hours        RADIOLOGY & ADDITIONAL TESTS:  Imaging or report Personally Reviewed:  [ ] YES  [ ] NO -->no new images    Telemetry reviewed independently - NSR, no acute events  EKG reviewed independently -->no new EKGs    Consultant(s) Notes Reviewed:  [ ] YES  [ ] NO  Care Discussed with Consultants/Other Providers [ ] YES  [ ] NO    Case discussed with resident  Care discussed with pt

## 2024-07-26 NOTE — PROGRESS NOTE ADULT - ASSESSMENT
87 yo F Jehovah's witness PMHxHTN, DVT/PE 2018, NICM chronic with improved (LVEF 40-45% in 4/23) s/p AICD, RA, ESRD on HD TTS who was brought in from [facility] for SOB, admitted for possible RLL PNA.    Acute Hypoxemic Hypercapnic Respiratory Failure:   Acute HFrEF   -possible pneumonia due to parainfluenza virus--supportive care  -Non ischemic Cardiomyopathy: s/p AICD  -Patient with SOB, cough,  -CXR showed bilateral lower lung opacities and effusion.   -Started on Lasix 40mg IV BID (still making a little urine)--change to PO today  -Continue Entresto and Metoprolol.   -Patient with cough, leukocytosis, could be pneumonia, Nasal MRSA negative  -ID recommended Levaquin 500mg post HD for 5 days, ends today on 7/24.   -s/p BiPAP, incentive spirometry  -echo shows reduction in EF to 25%. Cardio follow up requested:   - Continue with GDMT   - Up titrate Metoprolol succinate to 150 mg from 100 mg as needed  - Outpatient follow up with Cardiology     HIp and knee pain  - ID recommended w/u for RA flare  - pt reports she fell on this side 2 weeks ago  - xray done, pending read. Upon independent review there does not appear to be any frctures.   - ESR/CRP elevated  - tylenol dose increased  - no evidence of septic joint  - trial dose of prednisone as RF elevated. Pt seen by rheum 4 years ago on admission and responded well to prednisone taper.   - pt will need outpt rheum    ESRD on HD TTS  Continue HD.     HTN  Continue Entresto, Amlodipine and Metoprolol.     history PE/DVT  RA    DVT prophylaxis: Eliquis  GI prophylaxis: PPI    Code status: DNR/DNI     #Progress Note Handoff:  Pending (specify): trial prednisone, monitor on oral lasix  Family discussion:   Disposition: from SNF, likely dc in AM

## 2024-07-26 NOTE — PROGRESS NOTE ADULT - ASSESSMENT
88-year-old female (Latter day) PMH HTN, DVT/PE 2018, NICM - HFrEF improved (LVEF 40-45% in 4/23) s/p AICD, RA, ESRD on HD TTS who was brought in from [facility] for SOB.  # ESRD on HD T TH Sat   # SOB volume overload? CHF   -for HD  in AM   - increase uf goals with hd if able to tolerate  - d/c amlodipine if bp remains on the low side   - IP at target  no binders   - cardiology notes appreciated   - h/h noted / no need for ELIJAH   - change lasix to bumex po 2 q 12  if non oliguric / if oliguric  can d/c   will follow

## 2024-07-26 NOTE — PROGRESS NOTE ADULT - SUBJECTIVE AND OBJECTIVE BOX
Nephrology progress note    THIS IS AN INCOMPLETE NOTE . FULL NOTE TO FOLLOW SHORTLY    Patient is seen and examined, events over the last 24 h noted .    Allergies:  cephalosporins (Angioedema)  Keflex (Swelling)    Hospital Medications:   MEDICATIONS  (STANDING):  amLODIPine   Tablet 5 milliGRAM(s) Oral daily  apixaban 2.5 milliGRAM(s) Oral two times a day  atorvastatin 20 milliGRAM(s) Oral at bedtime  chlorhexidine 2% Cloths 1 Application(s) Topical daily  folic acid 1 milliGRAM(s) Oral daily  furosemide   Injectable 40 milliGRAM(s) IV Push two times a day  metoprolol succinate  milliGRAM(s) Oral daily  pantoprazole    Tablet 40 milliGRAM(s) Oral before breakfast  sacubitril 24 mG/valsartan 26 mG 1 Tablet(s) Oral <User Schedule>  sertraline 25 milliGRAM(s) Oral daily  timolol 0.5% Solution 1 Drop(s) Both EYES two times a day        VITALS:  T(F): 98.7 (07-26-24 @ 04:44), Max: 98.7 (07-26-24 @ 04:44)  HR: 65 (07-26-24 @ 04:44)  BP: 125/66 (07-26-24 @ 04:44)  RR: 18 (07-26-24 @ 04:44)  SpO2: 99% (07-25-24 @ 14:45)  Wt(kg): --    07-24 @ 07:01  -  07-25 @ 07:00  --------------------------------------------------------  IN: 595 mL / OUT: 300 mL / NET: 295 mL    07-25 @ 07:01  -  07-26 @ 07:00  --------------------------------------------------------  IN: 458 mL / OUT: 2250 mL / NET: -1792 mL          PHYSICAL EXAM:  Constitutional: NAD  HEENT: anicteric sclera, oropharynx clear, MMM  Neck: No JVD  Respiratory: CTAB, no wheezes, rales or rhonchi  Cardiovascular: S1, S2, RRR  Gastrointestinal: BS+, soft, NT/ND  Extremities: No cyanosis or clubbing. No peripheral edema  :  No carlos.   Skin: No rashes    LABS:  07-26    137  |  101  |  18  ----------------------------<  88  4.0   |  27  |  2.6<H>    Ca    8.2<L>      26 Jul 2024 07:26  Phos  3.5     07-26  Mg     2.2     07-26    TPro  5.7<L>  /  Alb  2.9<L>  /  TBili  0.4  /  DBili      /  AST  31  /  ALT  14  /  AlkPhos  152<H>  07-26                          10.4   7.08  )-----------( 237      ( 26 Jul 2024 07:26 )             32.9       Urine Studies:  Urinalysis Basic - ( 26 Jul 2024 07:26 )    Color:  / Appearance:  / SG:  / pH:   Gluc: 88 mg/dL / Ketone:   / Bili:  / Urobili:    Blood:  / Protein:  / Nitrite:    Leuk Esterase:  / RBC:  / WBC    Sq Epi:  / Non Sq Epi:  / Bacteria:           Iron 33, TIBC 147, %sat 22      [04-04-24 @ 06:44]  Ferritin 785      [04-04-24 @ 06:44]  PTH -- (Ca 8.2)      [04-09-24 @ 05:43]   35  PTH -- (Ca 8.2)      [03-24-24 @ 20:00]   138  Vitamin D (25OH) 11      [04-09-24 @ 05:43]  Lipid: chol 144, , HDL 39, LDL --      [03-22-24 @ 06:14]    HBsAb Nonreact      [03-27-24 @ 15:55]  HBsAg Nonreact      [04-01-24 @ 15:52]  HBcAb Nonreact      [04-03-24 @ 16:02]  HCV 0.20, Nonreact      [04-01-24 @ 15:52]    OSIEL: titer 1:160, pattern DFS70      [04-27-22 @ 16:00]  Rheumatoid Factor 22      [07-25-24 @ 05:46]  Free Light Chains: kappa 13.15, lambda 10.99, ratio = 1.20      [12-21 @ 07:39]  Immunofixation Serum:   No Monoclonal Band Identified    Reference Range: None Detected      [12-20-22 @ 10:57]  SPEP Interpretation: Normal Electrophoresis Pattern      [12-20-22 @ 10:57]  Immunofixation Urine: Reference Range: None Detected      [12-25-20 @ 12:35]  UPEP Interpretation: Mild Selective (Glomerular) Proteinuria      [12-25-20 @ 12:35]      RADIOLOGY & ADDITIONAL STUDIES:   Nephrology progress note    Patient is seen and examined, events over the last 24 h noted .  Lying in bed   on airborne     Allergies:  cephalosporins (Angioedema)  Keflex (Swelling)    Hospital Medications:   MEDICATIONS  (STANDING):  amLODIPine   Tablet 5 milliGRAM(s) Oral daily  apixaban 2.5 milliGRAM(s) Oral two times a day  atorvastatin 20 milliGRAM(s) Oral at bedtime  folic acid 1 milliGRAM(s) Oral daily  furosemide   Injectable 40 milliGRAM(s) IV Push two times a day  metoprolol succinate  milliGRAM(s) Oral daily  pantoprazole    Tablet 40 milliGRAM(s) Oral before breakfast  sacubitril 24 mG/valsartan 26 mG 1 Tablet(s) Oral <User Schedule>  sertraline 25 milliGRAM(s) Oral daily  timolol 0.5% Solution 1 Drop(s) Both EYES two times a day        VITALS:  T(F): 98.7 (07-26-24 @ 04:44), Max: 98.7 (07-26-24 @ 04:44)  HR: 65 (07-26-24 @ 04:44)  BP: 125/66 (07-26-24 @ 04:44)  RR: 18 (07-26-24 @ 04:44)  SpO2: 99% (07-25-24 @ 14:45)  Wt(kg): --    07-24 @ 07:01  -  07-25 @ 07:00  --------------------------------------------------------  IN: 595 mL / OUT: 300 mL / NET: 295 mL    07-25 @ 07:01  -  07-26 @ 07:00  --------------------------------------------------------  IN: 458 mL / OUT: 2250 mL / NET: -1792 mL          PHYSICAL EXAM:  Constitutional: NAD  Respiratory: CTAB,  Cardiovascular: S1, S2, RRR  Gastrointestinal: BS+, soft, NT/ND  Extremities: No cyanosis or clubbing. No peripheral edema  :  No carlos.   Skin: No rashes    LABS:  07-26    137  |  101  |  18  ----------------------------<  88  4.0   |  27  |  2.6<H>    Ca    8.2<L>      26 Jul 2024 07:26  Phos  3.5     07-26  Mg     2.2     07-26    TPro  5.7<L>  /  Alb  2.9<L>  /  TBili  0.4  /  DBili      /  AST  31  /  ALT  14  /  AlkPhos  152<H>  07-26                          10.4   7.08  )-----------( 237      ( 26 Jul 2024 07:26 )             32.9       Urine Studies:  Urinalysis Basic - ( 26 Jul 2024 07:26 )    Color:  / Appearance:  / SG:  / pH:   Gluc: 88 mg/dL / Ketone:   / Bili:  / Urobili:    Blood:  / Protein:  / Nitrite:    Leuk Esterase:  / RBC:  / WBC    Sq Epi:  / Non Sq Epi:  / Bacteria:           Iron 33, TIBC 147, %sat 22      [04-04-24 @ 06:44]  Ferritin 785      [04-04-24 @ 06:44]  PTH -- (Ca 8.2)      [04-09-24 @ 05:43]   35  PTH -- (Ca 8.2)      [03-24-24 @ 20:00]   138  Vitamin D (25OH) 11      [04-09-24 @ 05:43]  Lipid: chol 144, , HDL 39, LDL --      [03-22-24 @ 06:14]    HBsAb Nonreact      [03-27-24 @ 15:55]  HBsAg Nonreact      [04-01-24 @ 15:52]  HBcAb Nonreact      [04-03-24 @ 16:02]  HCV 0.20, Nonreact      [04-01-24 @ 15:52]    OSIEL: titer 1:160, pattern DFS70      [04-27-22 @ 16:00]  Rheumatoid Factor 22      [07-25-24 @ 05:46]  Free Light Chains: kappa 13.15, lambda 10.99, ratio = 1.20      [12-21 @ 07:39]  Immunofixation Serum:   No Monoclonal Band Identified    Reference Range: None Detected      [12-20-22 @ 10:57]  SPEP Interpretation: Normal Electrophoresis Pattern      [12-20-22 @ 10:57]  Immunofixation Urine: Reference Range: None Detected      [12-25-20 @ 12:35]  UPEP Interpretation: Mild Selective (Glomerular) Proteinuria      [12-25-20 @ 12:35]      RADIOLOGY & ADDITIONAL STUDIES:

## 2024-07-27 LAB
ALBUMIN SERPL ELPH-MCNC: 3 G/DL — LOW (ref 3.5–5.2)
ALP SERPL-CCNC: 159 U/L — HIGH (ref 30–115)
ALT FLD-CCNC: 17 U/L — SIGNIFICANT CHANGE UP (ref 0–41)
ANION GAP SERPL CALC-SCNC: 15 MMOL/L — HIGH (ref 7–14)
AST SERPL-CCNC: 38 U/L — SIGNIFICANT CHANGE UP (ref 0–41)
BILIRUB SERPL-MCNC: 0.3 MG/DL — SIGNIFICANT CHANGE UP (ref 0.2–1.2)
BUN SERPL-MCNC: 32 MG/DL — HIGH (ref 10–20)
CALCIUM SERPL-MCNC: 8.7 MG/DL — SIGNIFICANT CHANGE UP (ref 8.4–10.4)
CHLORIDE SERPL-SCNC: 96 MMOL/L — LOW (ref 98–110)
CO2 SERPL-SCNC: 22 MMOL/L — SIGNIFICANT CHANGE UP (ref 17–32)
CREAT SERPL-MCNC: 3.3 MG/DL — HIGH (ref 0.7–1.5)
EGFR: 13 ML/MIN/1.73M2 — LOW
GLUCOSE SERPL-MCNC: 138 MG/DL — HIGH (ref 70–99)
HCT VFR BLD CALC: 37.1 % — SIGNIFICANT CHANGE UP (ref 37–47)
HGB BLD-MCNC: 11.5 G/DL — LOW (ref 12–16)
MAGNESIUM SERPL-MCNC: 2.2 MG/DL — SIGNIFICANT CHANGE UP (ref 1.8–2.4)
MCHC RBC-ENTMCNC: 30.8 PG — SIGNIFICANT CHANGE UP (ref 27–31)
MCHC RBC-ENTMCNC: 31 G/DL — LOW (ref 32–37)
MCV RBC AUTO: 99.5 FL — HIGH (ref 81–99)
NRBC # BLD: 0 /100 WBCS — SIGNIFICANT CHANGE UP (ref 0–0)
PLATELET # BLD AUTO: 198 K/UL — SIGNIFICANT CHANGE UP (ref 130–400)
PMV BLD: 9.8 FL — SIGNIFICANT CHANGE UP (ref 7.4–10.4)
POTASSIUM SERPL-MCNC: 4.6 MMOL/L — SIGNIFICANT CHANGE UP (ref 3.5–5)
POTASSIUM SERPL-SCNC: 4.6 MMOL/L — SIGNIFICANT CHANGE UP (ref 3.5–5)
PROT SERPL-MCNC: 5.9 G/DL — LOW (ref 6–8)
RBC # BLD: 3.73 M/UL — LOW (ref 4.2–5.4)
RBC # FLD: 14.2 % — SIGNIFICANT CHANGE UP (ref 11.5–14.5)
SODIUM SERPL-SCNC: 133 MMOL/L — LOW (ref 135–146)
WBC # BLD: 7.1 K/UL — SIGNIFICANT CHANGE UP (ref 4.8–10.8)
WBC # FLD AUTO: 7.1 K/UL — SIGNIFICANT CHANGE UP (ref 4.8–10.8)

## 2024-07-27 PROCEDURE — 99232 SBSQ HOSP IP/OBS MODERATE 35: CPT

## 2024-07-27 PROCEDURE — 99233 SBSQ HOSP IP/OBS HIGH 50: CPT

## 2024-07-27 RX ORDER — TRAMADOL HCL 50 MG
25 TABLET ORAL
Refills: 0 | Status: DISCONTINUED | OUTPATIENT
Start: 2024-07-27 | End: 2024-07-28

## 2024-07-27 RX ORDER — PREDNISONE 10 MG/1
40 TABLET ORAL DAILY
Refills: 0 | Status: DISCONTINUED | OUTPATIENT
Start: 2024-07-27 | End: 2024-08-01

## 2024-07-27 RX ADMIN — APIXABAN 2.5 MILLIGRAM(S): 5 TABLET, FILM COATED ORAL at 05:22

## 2024-07-27 RX ADMIN — FUROSEMIDE 40 MILLIGRAM(S): 10 INJECTION, SOLUTION INTRAVENOUS at 05:22

## 2024-07-27 RX ADMIN — APIXABAN 2.5 MILLIGRAM(S): 5 TABLET, FILM COATED ORAL at 18:05

## 2024-07-27 RX ADMIN — PANTOPRAZOLE SODIUM 40 MILLIGRAM(S): 20 TABLET, DELAYED RELEASE ORAL at 05:22

## 2024-07-27 RX ADMIN — Medication 25 MILLIGRAM(S): at 18:06

## 2024-07-27 RX ADMIN — CHLORHEXIDINE GLUCONATE 1 APPLICATION(S): 500 CLOTH TOPICAL at 12:57

## 2024-07-27 RX ADMIN — ATORVASTATIN CALCIUM 20 MILLIGRAM(S): 40 TABLET, FILM COATED ORAL at 21:10

## 2024-07-27 RX ADMIN — TIMOLOL MALEATE 1 DROP(S): 2.5 SOLUTION/ DROPS OPHTHALMIC at 05:22

## 2024-07-27 RX ADMIN — SACUBITRIL AND VALSARTAN 1 TABLET(S): 49; 51 TABLET, FILM COATED ORAL at 05:21

## 2024-07-27 RX ADMIN — Medication 1 MILLIGRAM(S): at 12:46

## 2024-07-27 RX ADMIN — FUROSEMIDE 40 MILLIGRAM(S): 10 INJECTION, SOLUTION INTRAVENOUS at 14:44

## 2024-07-27 RX ADMIN — AMLODIPINE BESYLATE 5 MILLIGRAM(S): 2.5 TABLET ORAL at 05:21

## 2024-07-27 RX ADMIN — TIMOLOL MALEATE 1 DROP(S): 2.5 SOLUTION/ DROPS OPHTHALMIC at 18:06

## 2024-07-27 RX ADMIN — SERTRALINE HYDROCHLORIDE 25 MILLIGRAM(S): 100 TABLET, FILM COATED ORAL at 12:46

## 2024-07-27 NOTE — PROGRESS NOTE ADULT - ASSESSMENT
89 yo F [place] PMHxHTN, DVT/PE 2018, NICM chronic with improved (LVEF 40-45% in 4/23) s/p AICD, RA, ESRD on HD TTS who was brought in from [facility] for SOB, admitted for possible RLL PNA.    Acute Hypoxemic Hypercapnic Respiratory Failure:   Acute HFrEF   -possible pneumonia due to parainfluenza virus--supportive care  -Non ischemic Cardiomyopathy: s/p AICD  -Patient with SOB, cough,  -CXR showed bilateral lower lung opacities and effusion.   -Started on Lasix 40mg IV BID (still making a little urine)--change to PO today  -Continue Entresto and Metoprolol.   -Patient with cough, leukocytosis, could be pneumonia, Nasal MRSA negative  -ID recommended Levaquin 500mg post HD for 5 days, ends today on 7/24.   -s/p BiPAP, incentive spirometry  -echo shows reduction in EF to 25%. Cardio follow up requested:   - Continue with GDMT   - Up titrate Metoprolol succinate to 150 mg from 100 mg as needed  - Outpatient follow up with Cardiology     HIp and knee pain  - ID recommended w/u for RA flare  - pt reports she fell on this side 2 weeks ago  - xray shows no fx but severe arthritis  - ESR/CRP elevated  - tylenol dose increased, c/w prednisone, start tramadol  - no evidence of septic joint  - trial dose of prednisone as RF elevated. Pt seen by rheum 4 years ago on admission and responded well to prednisone taper.   - pt will need outpt rheum    ESRD on HD TTS  Continue HD.     HTN  Continue Entresto, Amlodipine and Metoprolol.     history PE/DVT  RA    DVT prophylaxis: Eliquis  GI prophylaxis: PPI    Code status: DNR/DNI     #Progress Note Handoff:  Pending (specify): return to STR  Family discussion: as above with pt  Disposition: from SNF, likely dc in AM

## 2024-07-27 NOTE — PROGRESS NOTE ADULT - SUBJECTIVE AND OBJECTIVE BOX
Nephrology progress note    Patient is seen and examined on HD session, events over the last 24 h noted.  No complaints, tolerating HD at prescribed bloodflow and UF goal.  No SOB or LE swelling noted in treatment.    Allergies:  cephalosporins (Angioedema)  Keflex (Swelling)    Hospital Medications:   MEDICATIONS  (STANDING):  amLODIPine   Tablet 5 milliGRAM(s) Oral daily  apixaban 2.5 milliGRAM(s) Oral two times a day  atorvastatin 20 milliGRAM(s) Oral at bedtime  chlorhexidine 2% Cloths 1 Application(s) Topical daily  folic acid 1 milliGRAM(s) Oral daily  furosemide    Tablet 40 milliGRAM(s) Oral two times a day  metoprolol succinate  milliGRAM(s) Oral daily  pantoprazole    Tablet 40 milliGRAM(s) Oral before breakfast  sacubitril 24 mG/valsartan 26 mG 1 Tablet(s) Oral <User Schedule>  sertraline 25 milliGRAM(s) Oral daily  timolol 0.5% Solution 1 Drop(s) Both EYES two times a day        VITALS:  T(F): 97.6 (07-27-24 @ 05:08), Max: 99.3 (07-26-24 @ 20:50)  HR: 60 (07-27-24 @ 09:00)  BP: 108/57 (07-27-24 @ 09:00)  RR: 16 (07-27-24 @ 09:00)  SpO2: 100% (07-27-24 @ 09:00)  Wt(kg): --    07-25 @ 07:01  -  07-26 @ 07:00  --------------------------------------------------------  IN: 458 mL / OUT: 2250 mL / NET: -1792 mL    07-26 @ 07:01  -  07-27 @ 07:00  --------------------------------------------------------  IN: 472 mL / OUT: 700 mL / NET: -228 mL          PHYSICAL EXAM:  Constitutional: NAD  Respiratory: CTAB,  Cardiovascular: S1, S2, RRR  Gastrointestinal: BS+, soft, NT/ND  Extremities: No cyanosis or clubbing. No peripheral edema  :  No carlos.   Skin: No rashes      LABS:  07-27    133<L>  |  96<L>  |  32<H>  ----------------------------<  138<H>  4.6   |  22  |  3.3<H>    Ca    8.7      27 Jul 2024 07:55  Phos  3.5     07-26  Mg     2.2     07-27    TPro  5.9<L>  /  Alb  3.0<L>  /  TBili  0.3  /  DBili      /  AST  38  /  ALT  17  /  AlkPhos  159<H>  07-27                          11.5   7.10  )-----------( 198      ( 27 Jul 2024 07:55 )             37.1       Urine Studies:  Urinalysis Basic - ( 27 Jul 2024 07:55 )    Color:  / Appearance:  / SG:  / pH:   Gluc: 138 mg/dL / Ketone:   / Bili:  / Urobili:    Blood:  / Protein:  / Nitrite:    Leuk Esterase:  / RBC:  / WBC    Sq Epi:  / Non Sq Epi:  / Bacteria:         RADIOLOGY & ADDITIONAL STUDIES:

## 2024-07-27 NOTE — PROGRESS NOTE ADULT - SUBJECTIVE AND OBJECTIVE BOX
CHIEF COMPLAINT:    Patient is a 88y old  Female who presents with a chief complaint of Pneumonia     INTERVAL HPI/OVERNIGHT EVENTS:    Patient seen and examined at bedside. No acute overnight events occurred.    ROS: Reports pain. All other systems are negative.    Medications:  Standing  amLODIPine   Tablet 5 milliGRAM(s) Oral daily  apixaban 2.5 milliGRAM(s) Oral two times a day  atorvastatin 20 milliGRAM(s) Oral at bedtime  chlorhexidine 2% Cloths 1 Application(s) Topical daily  folic acid 1 milliGRAM(s) Oral daily  furosemide    Tablet 40 milliGRAM(s) Oral two times a day  metoprolol succinate  milliGRAM(s) Oral daily  pantoprazole    Tablet 40 milliGRAM(s) Oral before breakfast  predniSONE   Tablet 40 milliGRAM(s) Oral daily  sacubitril 24 mG/valsartan 26 mG 1 Tablet(s) Oral <User Schedule>  sertraline 25 milliGRAM(s) Oral daily  timolol 0.5% Solution 1 Drop(s) Both EYES two times a day  traMADol 25 milliGRAM(s) Oral two times a day    PRN Meds  acetaminophen     Tablet .. 650 milliGRAM(s) Oral every 6 hours PRN        Vital Signs:    T(F): 98.4 (24 @ 12:36), Max: 99.3 (24 @ 20:50)  HR: 60 (24 @ 12:36) (59 - 82)  BP: 148/63 (24 @ 12:36) (108/57 - 148/63)  RR: 17 (24 @ 12:36) (16 - 18)  SpO2: 100% (24 @ 12:36) (98% - 100%)  I&O's Summary    2024 07:  -  2024 07:00  --------------------------------------------------------  IN: 472 mL / OUT: 700 mL / NET: -228 mL    2024 07:  -  2024 15:52  --------------------------------------------------------  IN: 450 mL / OUT: 2000 mL / NET: -1550 mL      Daily     Daily Weight in k.3 (2024 05:08)  CAPILLARY BLOOD GLUCOSE          PHYSICAL EXAM:  GENERAL:  NAD  SKIN: No rashes or lesions  HEENT: Atraumatic. Normocephalic. Anicteric  NECK:  No JVD.   PULMONARY: Clear to ausculation bilaterally. No wheezing. No rales  CVS: Normal S1, S2. Regular rate and rhythm. No murmurs.  ABDOMEN/GI: Soft, Nontender, Nondistended; Bowel sounds are present  EXTREMITIES:  No edema B/L LE.  NEUROLOGIC:  No motor deficit.  PSYCH: Alert & oriented x 3, normal affect      LABS:                        11.5   7.10  )-----------( 198      ( 2024 07:55 )             37.1         133<L>  |  96<L>  |  32<H>  ----------------------------<  138<H>  4.6   |  22  |  3.3<H>    Ca    8.7      2024 07:55  Phos  3.5       Mg     2.2         TPro  5.9<L>  /  Alb  3.0<L>  /  TBili  0.3  /  DBili  x   /  AST  38  /  ALT  17  /  AlkPhos  159<H>          RADIOLOGY & ADDITIONAL TESTS:  Imaging or report Personally Reviewed:  [ ] YES  [ ] NO -->no new images    Telemetry reviewed independently - NSR, no acute events  EKG reviewed independently -->no new EKGs    Consultant(s) Notes Reviewed:  [ ] YES  [ ] NO  Care Discussed with Consultants/Other Providers [ ] YES  [ ] NO    Case discussed with resident  Care discussed with pt

## 2024-07-27 NOTE — PROGRESS NOTE ADULT - SUBJECTIVE AND OBJECTIVE BOX
SUBJECTIVE/OVERNIGHT EVENTS  Today is hospital day 7d. This morning patient was seen and examined at bedside, resting comfortably in bed. No acute or major events overnight. Complaining of neck pain, similar to yesterday but improved.     PMH    Pneumonia due to infectious organism    No pertinent family history in first degree relatives    No pertinent family history in first degree relatives    FH: arthritis    Handoff    MEWS Score    Prior    Chronic kidney disease    Pacemaker    Hypertension    Gout    Diverticulitis    Atrial fibrillation    Diastolic heart failure    Rheumatoid arthritis    DVT, lower extremity    Pulmonary embolism    Chronic HFrEF (heart failure with reduced ejection fraction)    AICD (automatic cardioverter/defibrillator) present    ESRD on dialysis    RLL pneumonia    Artificial pacemaker    History of appendectomy    History of tonsillectomy    History of hysterectomy    H/O hernia repair    CHEST PAIN    90+    Elevated troponin    SOB (shortness of breath)    ESRD on dialysis    SysAdmin_VisitLink        MEDICATIONS  STANDING MEDICATIONS  amLODIPine   Tablet 5 milliGRAM(s) Oral daily  apixaban 2.5 milliGRAM(s) Oral two times a day  atorvastatin 20 milliGRAM(s) Oral at bedtime  chlorhexidine 2% Cloths 1 Application(s) Topical daily  folic acid 1 milliGRAM(s) Oral daily  furosemide    Tablet 40 milliGRAM(s) Oral two times a day  metoprolol succinate  milliGRAM(s) Oral daily  pantoprazole    Tablet 40 milliGRAM(s) Oral before breakfast  predniSONE   Tablet 40 milliGRAM(s) Oral daily  sacubitril 24 mG/valsartan 26 mG 1 Tablet(s) Oral <User Schedule>  sertraline 25 milliGRAM(s) Oral daily  timolol 0.5% Solution 1 Drop(s) Both EYES two times a day  traMADol 25 milliGRAM(s) Oral two times a day    PRN MEDICATIONS  acetaminophen     Tablet .. 650 milliGRAM(s) Oral every 6 hours PRN    VITALS  T(F): 98.4 (07-27-24 @ 12:36), Max: 99.3 (07-26-24 @ 20:50)  HR: 60 (07-27-24 @ 12:36) (59 - 82)  BP: 148/63 (07-27-24 @ 12:36) (108/57 - 148/63)  RR: 17 (07-27-24 @ 12:36) (16 - 18)  SpO2: 100% (07-27-24 @ 12:36) (98% - 100%)    PHYSICAL EXAM  GENERAL  (x  ) NAD, lying in bed comfortably     (  ) obtunded     (  ) lethargic     (  ) somnolent    HEAD  ( x ) Atraumatic     (  ) hematoma     (  ) laceration (specify location:       )     NECK  ( x ) Supple     (  ) neck stiffness     (  ) nuchal rigidity     (  )  no JVD     (  ) JVD present ( -- cm)    HEART  Rate -->  (x  ) normal rate    (  ) bradycardic    (  ) tachycardic  Rhythm -->  (x  ) regular    (  ) regularly irregular    (  ) irregularly irregular  Murmurs -->  (  ) normal s1/s2    (  ) systolic murmur    (  ) diastolic murmur    (  ) continuous murmur     (  ) S3 present    (  ) S4 present    LUNGS  ( x )Unlabored respirations     (  ) tachypnea  (  ) B/L air entry     (  ) decreased breath sounds in:  (location     )    (  ) no adventitious sound     (  ) crackles     (  ) wheezing      (  ) rhonchi      (specify location:       )  (  ) chest wall tenderness (specify location:       )    ABDOMEN  ( x ) Soft     (  ) tense   |   (  ) nondistended     (  ) distended   |   (  ) +BS     (  ) hypoactive bowel sounds     (  ) hyperactive bowel sounds  (  ) nontender     (  ) RUQ tenderness     (  ) RLQ tenderness     (  ) LLQ tenderness     (  ) epigastric tenderness     (  ) diffuse tenderness  (  ) Splenomegaly      (  ) Hepatomegaly      (  ) Jaundice     (  ) ecchymosis     EXTREMITIES  (x  ) Normal     (  ) Rash     (  ) ecchymosis     (  ) varicose veins      (  ) pitting edema     (  ) non-pitting edema   (  ) ulceration     (  ) gangrene:     (location:     )    NERVOUS SYSTEM  ( x ) A&Ox3     (  ) confused     (  ) lethargic  CN II-XII:     (  ) Intact     (  ) focal deficits  (Specify:     )   Upper extremities:     (  ) strength X/5     (  ) focal deficit (specify:    )  Lower extremities:     (  ) strength  X/5    (  ) focal deficit (specify:    )    SKIN  ( x ) No rashes or lesions     (  ) maculopapular rash     (  ) pustules     (  ) vesicles     (  ) ulcer     (  ) ecchymosis     (specify location:     )    [facility]    LABS             11.5   7.10  )-----------( 198      ( 07-27-24 @ 07:55 )             37.1     133  |  96  |  32  -------------------------<  138   07-27-24 @ 07:55  4.6  |  22  |  3.3    Ca      8.7     07-27-24 @ 07:55  Phos   3.5     07-26-24 @ 07:26  Mg     2.2     07-27-24 @ 07:55    TPro  5.9  /  Alb  3.0  /  TBili  0.3  /  DBili  x   /  AST  38  /  ALT  17  /  AlkPhos  159  /  GGT  x     07-27-24 @ 07:55        Urinalysis Basic - ( 27 Jul 2024 07:55 )    Color: x / Appearance: x / SG: x / pH: x  Gluc: 138 mg/dL / Ketone: x  / Bili: x / Urobili: x   Blood: x / Protein: x / Nitrite: x   Leuk Esterase: x / RBC: x / WBC x   Sq Epi: x / Non Sq Epi: x / Bacteria: x          IMAGING    General:  - DVT proph  - GI proph  - Diet  - Activity  - Code Status

## 2024-07-27 NOTE — PROGRESS NOTE ADULT - ASSESSMENT
Ms. Marrufo is an 88-year-old female (Orthodox) PMH HTN, DVT/PE 2018, NICM - HFrEF improved (LVEF 40-45% in 4/23) s/p AICD, RA, ESRD on HD TTS who was brought in from [facility] for SOB, admitted for possible RLL PNA.    Acute Hypoxemic Hypercapnic Respiratory Failure 2/2 CAP vs. HFrEF Exacerbation  -patient on Eliquis, doubt PE  -pt coughing and SOB on presentation   -CXR: B/L opacities and effusions  - Procal 0.23  - MRSA neg   - Trop downtrending   - parainfluenza +  - legionella neg  -BCx NGTD  -monitoring off antibiotics   - incentive spirometry    #ESRD on HD TTS  -HD per nephro    #HTN  #HFrEF dec- now 25% per echo 7/23  #HO PE/DVT  -pt had runs of NSVT on tele   Echo: (7/23)  1. Normal left ventricular internal cavity size.   2. Severely decreased global left ventricular systolic function.   3. Left ventricular ejection fraction, by visual estimation, is 25 to   30%.   4. Moderate concentric left ventricular hypertrophy.   5. Spectral Doppler shows pseudonormal pattern of left ventricular   myocardial filling (Grade II diastolic dysfunction).   6. Normal right ventricular size and function.   7. Severely enlarged left atrium.   8. Mild mitral valve regurgitation.   9. Moderate tricuspid regurgitation.  10. Estimated pulmonary artery systolic pressure is 52.2 mmHg assuming a   right atrial pressure of 15 mmHg, which is consistent with moderate   pulmonary hypertension.  11. Moderate pleural effusion in the left lateral region.  12. Dilatation of the ascending aorta.  13. Compared to study dated 4/9/24, the LVEF has decreased.    -c/w Entresto 24 mg qd (unclear why not on bid dosing)  -c/w amlodipine 5 mg qd  -c/w atorvastatin 20 mg qhs  -c/w metoprolol ER 50 mg qd  -c/w Eliquis 2.5 mg bid  - cardio recs  given dec EF and inc trop:  - Up titrate Metoprolol succinate to 150 mg from 100 mg as needed  - Outpatient follow up with Cardiology   - transitioning to PO lasix 40mg BID today per cardio     # Possible RA flair vs osteoarthritis vs pain 2/2 previous trauma   - Hip X-ray: Chronic left proximal femur intertrochanteric fracture deformity with gamma nail without evidence of hardware complication. Moderate/severe degenerative changes of the left hip. Moderate   degenerative change of the right hip.  - CRP 70.5,   - pt noted a fall before coming to the hospital  - pt reporting knee pain  - RF elevated 22  - will give dose 40mg IVmethylprednisolone once and reassess   - OP f/u    DVT prophylaxis: Eliquis  GI prophylaxis: PPI  Diet: renal  Code status: DNR/DNI   Activity: IAT PT  Dispo: po lasix, pending placement   Ms. Marrufo is an 88-year-old female (Judaism) PMH HTN, DVT/PE 2018, NICM - HFrEF improved (LVEF 40-45% in 4/23) s/p AICD, RA, ESRD on HD TTS who was brought in from [facility] for SOB, admitted for possible RLL PNA.    Acute Hypoxemic Hypercapnic Respiratory Failure 2/2 CAP vs. HFrEF Exacerbation  -patient on Eliquis, doubt PE  -pt coughing and SOB on presentation   -CXR: B/L opacities and effusions  - Procal 0.23  - MRSA neg   - Trop downtrending   - parainfluenza +  - legionella neg  -BCx NGTD  -monitoring off antibiotics   - incentive spirometry    #ESRD on HD TTS  -HD per nephro    #HTN  #HFrEF dec- now 25% per echo 7/23  #HO PE/DVT  -pt had runs of NSVT on tele   Echo: (7/23)  1. Normal left ventricular internal cavity size.   2. Severely decreased global left ventricular systolic function.   3. Left ventricular ejection fraction, by visual estimation, is 25 to   30%.   4. Moderate concentric left ventricular hypertrophy.   5. Spectral Doppler shows pseudonormal pattern of left ventricular   myocardial filling (Grade II diastolic dysfunction).   6. Normal right ventricular size and function.   7. Severely enlarged left atrium.   8. Mild mitral valve regurgitation.   9. Moderate tricuspid regurgitation.  10. Estimated pulmonary artery systolic pressure is 52.2 mmHg assuming a   right atrial pressure of 15 mmHg, which is consistent with moderate   pulmonary hypertension.  11. Moderate pleural effusion in the left lateral region.  12. Dilatation of the ascending aorta.  13. Compared to study dated 4/9/24, the LVEF has decreased.    -c/w Entresto 24 mg qd (unclear why not on bid dosing)  -c/w amlodipine 5 mg qd  -c/w atorvastatin 20 mg qhs  -c/w metoprolol ER 50 mg qd  -c/w Eliquis 2.5 mg bid  - cardio recs  given dec EF and inc trop:  - Up titrate Metoprolol succinate to 150 mg from 100 mg as needed  - Outpatient follow up with Cardiology   - transitioning to PO lasix 40mg BID per cardio     # Possible RA flair vs osteoarthritis vs pain 2/2 previous trauma   - Hip X-ray: Chronic left proximal femur intertrochanteric fracture deformity with gamma nail without evidence of hardware complication. Moderate/severe degenerative changes of the left hip. Moderate   degenerative change of the right hip.  - CRP 70.5,   - pt noted a fall before coming to the hospital  - pt reporting knee pain  - RF elevated 22  - OP f/u  - PO pred 40 qd    DVT prophylaxis: Eliquis  GI prophylaxis: PPI  Diet: renal  Code status: DNR/DNI   Activity: IAT PT  Dispo: po lasix, pending placement

## 2024-07-27 NOTE — PROGRESS NOTE ADULT - ASSESSMENT
88-year-old female (Sabianist) PMH HTN, DVT/PE 2018, NICM - HFrEF improved (LVEF 40-45% in 4/23) s/p AICD, RA, ESRD on HD TTS who was brought in from [facility] for SOB.    # ESRD on HD  - HD today  - continue HD on TTS schedule, next HD planned for 07/30  - will attempt to increase UF as BP tolerates    # SOB volume overload? CHF   - d/c amlodipine if bp remains on the low side   - cardiology notes appreciated     #MBD  - Phos at target, not on binders     #Anemia  - h/h noted / no need for ELIJAH     #HTN  - change lasix to bumex po 2 q 12  if non oliguric / if oliguric  can d/c   - continue Amlodipine at current dose      will follow

## 2024-07-28 LAB
ALBUMIN SERPL ELPH-MCNC: 2.8 G/DL — LOW (ref 3.5–5.2)
ALP SERPL-CCNC: 146 U/L — HIGH (ref 30–115)
ALT FLD-CCNC: 16 U/L — SIGNIFICANT CHANGE UP (ref 0–41)
ANION GAP SERPL CALC-SCNC: 11 MMOL/L — SIGNIFICANT CHANGE UP (ref 7–14)
AST SERPL-CCNC: 31 U/L — SIGNIFICANT CHANGE UP (ref 0–41)
BILIRUB SERPL-MCNC: 0.3 MG/DL — SIGNIFICANT CHANGE UP (ref 0.2–1.2)
BUN SERPL-MCNC: 22 MG/DL — HIGH (ref 10–20)
CALCIUM SERPL-MCNC: 8.1 MG/DL — LOW (ref 8.4–10.5)
CHLORIDE SERPL-SCNC: 95 MMOL/L — LOW (ref 98–110)
CO2 SERPL-SCNC: 26 MMOL/L — SIGNIFICANT CHANGE UP (ref 17–32)
CREAT SERPL-MCNC: 2.6 MG/DL — HIGH (ref 0.7–1.5)
EGFR: 17 ML/MIN/1.73M2 — LOW
GLUCOSE SERPL-MCNC: 81 MG/DL — SIGNIFICANT CHANGE UP (ref 70–99)
HCT VFR BLD CALC: 35 % — LOW (ref 37–47)
HGB BLD-MCNC: 10.8 G/DL — LOW (ref 12–16)
MCHC RBC-ENTMCNC: 30.7 PG — SIGNIFICANT CHANGE UP (ref 27–31)
MCHC RBC-ENTMCNC: 30.9 G/DL — LOW (ref 32–37)
MCV RBC AUTO: 99.4 FL — HIGH (ref 81–99)
NRBC # BLD: 0 /100 WBCS — SIGNIFICANT CHANGE UP (ref 0–0)
PLATELET # BLD AUTO: 262 K/UL — SIGNIFICANT CHANGE UP (ref 130–400)
PMV BLD: 9.3 FL — SIGNIFICANT CHANGE UP (ref 7.4–10.4)
POTASSIUM SERPL-MCNC: 4.4 MMOL/L — SIGNIFICANT CHANGE UP (ref 3.5–5)
POTASSIUM SERPL-SCNC: 4.4 MMOL/L — SIGNIFICANT CHANGE UP (ref 3.5–5)
PROT SERPL-MCNC: 5.9 G/DL — LOW (ref 6–8)
RBC # BLD: 3.52 M/UL — LOW (ref 4.2–5.4)
RBC # FLD: 14.3 % — SIGNIFICANT CHANGE UP (ref 11.5–14.5)
SODIUM SERPL-SCNC: 132 MMOL/L — LOW (ref 135–146)
WBC # BLD: 8.64 K/UL — SIGNIFICANT CHANGE UP (ref 4.8–10.8)
WBC # FLD AUTO: 8.64 K/UL — SIGNIFICANT CHANGE UP (ref 4.8–10.8)

## 2024-07-28 PROCEDURE — 99232 SBSQ HOSP IP/OBS MODERATE 35: CPT

## 2024-07-28 RX ORDER — TRAMADOL HCL 50 MG
25 TABLET ORAL
Refills: 0 | Status: DISCONTINUED | OUTPATIENT
Start: 2024-07-28 | End: 2024-08-01

## 2024-07-28 RX ADMIN — PANTOPRAZOLE SODIUM 40 MILLIGRAM(S): 20 TABLET, DELAYED RELEASE ORAL at 06:09

## 2024-07-28 RX ADMIN — FUROSEMIDE 40 MILLIGRAM(S): 10 INJECTION, SOLUTION INTRAVENOUS at 06:09

## 2024-07-28 RX ADMIN — TIMOLOL MALEATE 1 DROP(S): 2.5 SOLUTION/ DROPS OPHTHALMIC at 06:10

## 2024-07-28 RX ADMIN — SERTRALINE HYDROCHLORIDE 25 MILLIGRAM(S): 100 TABLET, FILM COATED ORAL at 11:30

## 2024-07-28 RX ADMIN — Medication 1 MILLIGRAM(S): at 11:31

## 2024-07-28 RX ADMIN — SACUBITRIL AND VALSARTAN 1 TABLET(S): 49; 51 TABLET, FILM COATED ORAL at 06:13

## 2024-07-28 RX ADMIN — PREDNISONE 40 MILLIGRAM(S): 10 TABLET ORAL at 06:09

## 2024-07-28 RX ADMIN — CHLORHEXIDINE GLUCONATE 1 APPLICATION(S): 500 CLOTH TOPICAL at 11:35

## 2024-07-28 RX ADMIN — APIXABAN 2.5 MILLIGRAM(S): 5 TABLET, FILM COATED ORAL at 06:09

## 2024-07-28 RX ADMIN — FUROSEMIDE 40 MILLIGRAM(S): 10 INJECTION, SOLUTION INTRAVENOUS at 13:06

## 2024-07-28 RX ADMIN — TIMOLOL MALEATE 1 DROP(S): 2.5 SOLUTION/ DROPS OPHTHALMIC at 17:35

## 2024-07-28 RX ADMIN — Medication 100 MILLIGRAM(S): at 06:09

## 2024-07-28 RX ADMIN — ATORVASTATIN CALCIUM 20 MILLIGRAM(S): 40 TABLET, FILM COATED ORAL at 21:17

## 2024-07-28 RX ADMIN — AMLODIPINE BESYLATE 5 MILLIGRAM(S): 2.5 TABLET ORAL at 06:09

## 2024-07-28 RX ADMIN — Medication 25 MILLIGRAM(S): at 17:35

## 2024-07-28 RX ADMIN — Medication 25 MILLIGRAM(S): at 06:10

## 2024-07-28 RX ADMIN — APIXABAN 2.5 MILLIGRAM(S): 5 TABLET, FILM COATED ORAL at 17:35

## 2024-07-28 NOTE — PROGRESS NOTE ADULT - ASSESSMENT
89 yo F [place] PMHxHTN, DVT/PE 2018, NICM chronic with improved (LVEF 40-45% in 4/23) s/p AICD, RA, ESRD on HD TTS [facility] admitted for possible RLL PNA.    Acute Hypoxemic Hypercapnic Respiratory Failure:   Acute HFrEF   -possible pneumonia due to parainfluenza virus--supportive care  -Non ischemic Cardiomyopathy: s/p AICD  -Patient with SOB, cough,  -CXR showed bilateral lower lung opacities and effusion.   -Started on Lasix 40mg IV BID (still making a little urine)--change to PO today  -Continue Entresto and Metoprolol.   -Patient with cough, leukocytosis, could be pneumonia, Nasal MRSA negative  -ID recommended Levaquin 500mg post HD for 5 days, ends today on 7/24.   -s/p BiPAP, incentive spirometry  -echo shows reduction in EF to 25%. Cardio follow up requested:   - Continue with GDMT   - Up titrate Metoprolol succinate to 150 mg from 100 mg as needed  - Outpatient follow up with Cardiology     HIp and knee pain  - ID recommended w/u for RA flare  - pt reports she fell on this side 2 weeks ago  - xray shows no fx but severe arthritis  - ESR/CRP elevated  - tylenol dose increased, c/w prednisone, start tramadol  - no evidence of septic joint  - trial dose of prednisone as RF elevated. Pt seen by rheum 4 years ago on admission and responded well to prednisone taper.   - pt will need outpt rheum  - reports improvement in pain    ESRD on HD TTS  Continue HD.     HTN  Continue Entresto, Amlodipine and Metoprolol.     history PE/DVT  RA    DVT prophylaxis: Eliquis  GI prophylaxis: PPI    Code status: DNR/DNI     #Progress Note Handoff:  Pending (specify): return to [facility]-as per CM bed available tomorrow  Family discussion: as above with pt  Disposition: from SNF, likely dc in AM

## 2024-07-28 NOTE — PROGRESS NOTE ADULT - SUBJECTIVE AND OBJECTIVE BOX
CHIEF COMPLAINT:    Patient is a 88y old  Female who presents with a chief complaint of Pneumonia (2024 16:16)      INTERVAL HPI/OVERNIGHT EVENTS:    Patient seen and examined at bedside. No acute overnight events occurred.    ROS: All other systems are negative.    Medications:  Standing  amLODIPine   Tablet 5 milliGRAM(s) Oral daily  apixaban 2.5 milliGRAM(s) Oral two times a day  atorvastatin 20 milliGRAM(s) Oral at bedtime  chlorhexidine 2% Cloths 1 Application(s) Topical daily  folic acid 1 milliGRAM(s) Oral daily  furosemide    Tablet 40 milliGRAM(s) Oral two times a day  metoprolol succinate  milliGRAM(s) Oral daily  pantoprazole    Tablet 40 milliGRAM(s) Oral before breakfast  predniSONE   Tablet 40 milliGRAM(s) Oral daily  sacubitril 24 mG/valsartan 26 mG 1 Tablet(s) Oral <User Schedule>  sertraline 25 milliGRAM(s) Oral daily  timolol 0.5% Solution 1 Drop(s) Both EYES two times a day  traMADol 25 milliGRAM(s) Oral two times a day    PRN Meds  acetaminophen     Tablet .. 650 milliGRAM(s) Oral every 6 hours PRN        Vital Signs:    T(F): 97.1 (24 @ 12:58), Max: 98.3 (24 @ 21:23)  HR: 77 (24 @ 12:58) (59 - 77)  BP: 102/58 (24 @ 12:58) (98/51 - 119/60)  RR: 18 (24 @ 12:58) (18 - 18)  SpO2: 100% (24 @ 08:17) (98% - 100%)  I&O's Summary    2024 07:  -  2024 07:00  --------------------------------------------------------  IN: 930 mL / OUT: 3000 mL / NET: -2070 mL    2024 07:  -  2024 14:42  --------------------------------------------------------  IN: 210 mL / OUT: 0 mL / NET: 210 mL      Daily     Daily Weight in k.5 (2024 05:28)  CAPILLARY BLOOD GLUCOSE          PHYSICAL EXAM:  GENERAL:  NAD  SKIN: No rashes or lesions  HEENT: Atraumatic. Normocephalic. Anicteric  NECK:  No JVD.   PULMONARY: Clear to ausculation bilaterally. No wheezing. No rales  CVS: Normal S1, S2. Regular rate and rhythm. No murmurs.  ABDOMEN/GI: Soft, Nontender, Nondistended; Bowel sounds are present  EXTREMITIES:  No edema B/L LE.  NEUROLOGIC:  No motor deficit.  PSYCH: Alert & oriented x 3, normal affect      LABS:                        10.8   8.64  )-----------( 262      ( 2024 07:21 )             35.0         132<L>  |  95<L>  |  22<H>  ----------------------------<  81  4.4   |  26  |  2.6<H>    Ca    8.1<L>      2024 07:21  Mg     2.2         TPro  5.9<L>  /  Alb  2.8<L>  /  TBili  0.3  /  DBili  x   /  AST  31  /  ALT  16  /  AlkPhos  146<H>                RADIOLOGY & ADDITIONAL TESTS:  Imaging or report Personally Reviewed:  [ ] YES  [ ] NO -->no new images    Telemetry reviewed independently - NSR, no acute events  EKG reviewed independently -->no new EKGs    Consultant(s) Notes Reviewed:  [ ] YES  [ ] NO  Care Discussed with Consultants/Other Providers [ ] YES  [ ] NO    Case discussed with resident  Care discussed with pt

## 2024-07-29 LAB
CULTURE RESULTS: SIGNIFICANT CHANGE UP
SPECIMEN SOURCE: SIGNIFICANT CHANGE UP

## 2024-07-29 PROCEDURE — 99232 SBSQ HOSP IP/OBS MODERATE 35: CPT

## 2024-07-29 RX ORDER — LIDOCAINE 5% 5 G/100G
1 CREAM TOPICAL DAILY
Refills: 0 | Status: DISCONTINUED | OUTPATIENT
Start: 2024-07-29 | End: 2024-08-01

## 2024-07-29 RX ADMIN — ATORVASTATIN CALCIUM 20 MILLIGRAM(S): 40 TABLET, FILM COATED ORAL at 21:23

## 2024-07-29 RX ADMIN — APIXABAN 2.5 MILLIGRAM(S): 5 TABLET, FILM COATED ORAL at 17:34

## 2024-07-29 RX ADMIN — SERTRALINE HYDROCHLORIDE 25 MILLIGRAM(S): 100 TABLET, FILM COATED ORAL at 12:04

## 2024-07-29 RX ADMIN — TIMOLOL MALEATE 1 DROP(S): 2.5 SOLUTION/ DROPS OPHTHALMIC at 05:26

## 2024-07-29 RX ADMIN — APIXABAN 2.5 MILLIGRAM(S): 5 TABLET, FILM COATED ORAL at 05:21

## 2024-07-29 RX ADMIN — SACUBITRIL AND VALSARTAN 1 TABLET(S): 49; 51 TABLET, FILM COATED ORAL at 06:20

## 2024-07-29 RX ADMIN — FUROSEMIDE 40 MILLIGRAM(S): 10 INJECTION, SOLUTION INTRAVENOUS at 13:17

## 2024-07-29 RX ADMIN — CHLORHEXIDINE GLUCONATE 1 APPLICATION(S): 500 CLOTH TOPICAL at 13:21

## 2024-07-29 RX ADMIN — PANTOPRAZOLE SODIUM 40 MILLIGRAM(S): 20 TABLET, DELAYED RELEASE ORAL at 05:21

## 2024-07-29 RX ADMIN — TIMOLOL MALEATE 1 DROP(S): 2.5 SOLUTION/ DROPS OPHTHALMIC at 17:34

## 2024-07-29 RX ADMIN — Medication 100 MILLIGRAM(S): at 06:20

## 2024-07-29 RX ADMIN — AMLODIPINE BESYLATE 5 MILLIGRAM(S): 2.5 TABLET ORAL at 06:20

## 2024-07-29 RX ADMIN — LIDOCAINE 5% 1 PATCH: 5 CREAM TOPICAL at 18:11

## 2024-07-29 RX ADMIN — Medication 1 MILLIGRAM(S): at 12:04

## 2024-07-29 RX ADMIN — Medication 25 MILLIGRAM(S): at 05:22

## 2024-07-29 RX ADMIN — FUROSEMIDE 40 MILLIGRAM(S): 10 INJECTION, SOLUTION INTRAVENOUS at 06:20

## 2024-07-29 RX ADMIN — PREDNISONE 40 MILLIGRAM(S): 10 TABLET ORAL at 05:21

## 2024-07-29 RX ADMIN — LIDOCAINE 5% 1 PATCH: 5 CREAM TOPICAL at 18:03

## 2024-07-29 RX ADMIN — Medication 25 MILLIGRAM(S): at 17:34

## 2024-07-29 NOTE — PROGRESS NOTE ADULT - SUBJECTIVE AND OBJECTIVE BOX
CHIEF COMPLAINT:    Patient is a 88y old  Female who presents with a chief complaint of Pneumonia    INTERVAL HPI/OVERNIGHT EVENTS:    Patient seen and examined at bedside. No acute overnight events occurred.    ROS: Denies SOB, chest pain. Reports improvemtn in pain. All other systems are negative.    Medications:  Standing  amLODIPine   Tablet 5 milliGRAM(s) Oral daily  apixaban 2.5 milliGRAM(s) Oral two times a day  atorvastatin 20 milliGRAM(s) Oral at bedtime  chlorhexidine 2% Cloths 1 Application(s) Topical daily  folic acid 1 milliGRAM(s) Oral daily  furosemide    Tablet 40 milliGRAM(s) Oral two times a day  metoprolol succinate  milliGRAM(s) Oral daily  pantoprazole    Tablet 40 milliGRAM(s) Oral before breakfast  predniSONE   Tablet 40 milliGRAM(s) Oral daily  sacubitril 24 mG/valsartan 26 mG 1 Tablet(s) Oral <User Schedule>  sertraline 25 milliGRAM(s) Oral daily  timolol 0.5% Solution 1 Drop(s) Both EYES two times a day  traMADol 25 milliGRAM(s) Oral two times a day    PRN Meds  acetaminophen     Tablet .. 650 milliGRAM(s) Oral every 6 hours PRN        Vital Signs:    T(F): 97.6 (24 @ 13:18), Max: 97.6 (24 @ 05:18)  HR: 60 (24 @ 13:18) (60 - 68)  BP: 95/59 (24 @ 13:18) (95/59 - 120/54)  RR: 18 (24 @ 13:19) (16 - 18)  SpO2: 100% (24 @ 13:19) (98% - 100%)  I&O's Summary    2024 07:  -  2024 07:00  --------------------------------------------------------  IN: 1090 mL / OUT: 1200 mL / NET: -110 mL    2024 07:  -  2024 17:12  --------------------------------------------------------  IN: 536 mL / OUT: 0 mL / NET: 536 mL      Daily     Daily Weight in k.4 (2024 05:18)  CAPILLARY BLOOD GLUCOSE          PHYSICAL EXAM:  GENERAL:  NAD  SKIN: No rashes or lesions  HEENT: Atraumatic. Normocephalic. Anicteric  NECK:  No JVD.   PULMONARY: Clear to ausculation bilaterally. No wheezing. No rales  CVS: Normal S1, S2. Regular rate and rhythm. No murmurs.  ABDOMEN/GI: Soft, Nontender, Nondistended; Bowel sounds are present  EXTREMITIES:  No edema B/L LE.  NEUROLOGIC:  No motor deficit.  PSYCH: Alert & oriented x 3, normal affect      LABS:                        10.8   8.64  )-----------( 262      ( 2024 07:21 )             35.0         132<L>  |  95<L>  |  22<H>  ----------------------------<  81  4.4   |  26  |  2.6<H>    Ca    8.1<L>      2024 07:21    TPro  5.9<L>  /  Alb  2.8<L>  /  TBili  0.3  /  DBili  x   /  AST  31  /  ALT  16  /  AlkPhos  146<H>                RADIOLOGY & ADDITIONAL TESTS:    Care discussed with pt

## 2024-07-29 NOTE — PROGRESS NOTE ADULT - ASSESSMENT
89 yo F Confucianism PMHxHTN, DVT/PE 2018, NICM chronic with improved (LVEF 40-45% in 4/23) s/p AICD, RA, ESRD on HD TTS who was brought in from [facility] for SOB, admitted for possible RLL PNA.    Acute Hypoxemic Hypercapnic Respiratory Failure:   Acute HFrEF   -possible pneumonia due to parainfluenza virus--supportive care  -Non ischemic Cardiomyopathy: s/p AICD  -Patient with SOB, cough,  -CXR showed bilateral lower lung opacities and effusion.   -Started on Lasix 40mg IV BID (still making a little urine)--change to PO today  -Continue Entresto and Metoprolol.   -Patient with cough, leukocytosis, could be pneumonia, Nasal MRSA negative  -s/p BiPAP, incentive spirometry  -echo shows reduction in EF to 25%. Cardio follow up requested:   - Continue with GDMT   - Up titrate Metoprolol succinate to 150 mg from 100 mg as needed  - Outpatient follow up with Cardiology     HIp and knee pain  - ID recommended w/u for RA flare  - pt reports she fell on this side 2 weeks ago  - xray shows no fx but severe arthritis  - ESR/CRP elevated  - tylenol dose increased, c/w prednisone, start tramadol  - no evidence of septic joint  - trial dose of prednisone as RF elevated. Pt seen by rheum 4 years ago on admission and responded well to prednisone taper.   - pt will need outpt rheum  - reports significant improvement in pain  - lidocaine patch ordered for hip pain    ESRD on HD TTS  Continue HD.     HTN  Continue Entresto, Amlodipine and Metoprolol.     history PE/DVT  RA    DVT prophylaxis: Eliquis  GI prophylaxis: PPI    Code status: DNR/DNI     #Progress Note Handoff:  Pending (specify): placement  Family discussion: as above with pt  Disposition: from SNF, likely dc in AM

## 2024-07-29 NOTE — PROGRESS NOTE ADULT - SUBJECTIVE AND OBJECTIVE BOX
RACHEL MARRUFO  88y  Female      Ms. Marrufo is an 88-year-old female (Confucianism) PMH HTN, DVT/PE 2018, NICM - HFrEF improved (LVEF 40-45% in 4/23) s/p AICD, RA, ESRD on HD TTS who was brought in from [facility] for SOB.    The patient had been having non-productive cough and pleuritic chest pain since the morning of admission. She was being treated for UTI at the NH with Bactrim. Denied fevers, chills, LOC, N/V/D.    On presentation in the ED she was on 10L NRB satting 98% and was then placed on BiPAP for increased WOB.    Vitals in the ED:  /103  HR 98  SpO2 98% on 10L NRB  RR 24  Temp 98 F    Physical Exam:  GEN: appears chronically ill, on BiPAP  RESP: decreased BS bilaterally, R rhonchi  CARD: regular S1, S2, peripheral pulses palpable  ABD: NTND  EXT: no FABRICIO  NEURO: AOx3  PSYCH: cooperative    Labs:  WBC 12K  BUN/Cr 25/3.1    Trop 80  VBG: pCO2 55 pH 7.36 lactate 1.5    Imaging:  CXR: RLL infiltrate, pulmonary vascular congestion on my read  ECG showed LBBB unchanged from prior.    The patient received aztreonam and levofloxacin and was admitted to medicine.        INTERVAL HPI/OVERNIGHT EVENTS:  Patient denies any overnight events, no othopnea, PNDs. No fever, chills, cough, diaphoresis. No chest pain, diaphoresis, nvd. Patient is having good saturation on 2L NC, will try to wean off to room air.    REVIEW OF SYSTEMS:  CONSTITUTIONAL: No fever, weight loss, or fatigue  EYES: No eye pain, visual disturbances, or discharge  ENMT:  No difficulty hearing, tinnitus, vertigo; No sinus or throat pain  NECK: No pain or stiffness  RESPIRATORY: No cough, wheezing, chills or hemoptysis; No shortness of breath  CARDIOVASCULAR: No chest pain, palpitations, dizziness, or leg swelling  GASTROINTESTINAL: No abdominal or epigastric pain. No diarrhea or constipation. No nausea, vomiting, or hematemesis. No melena or hematochezia.  GENITOURINARY: No dysuria, frequency, hematuria, or incontinence  NEUROLOGICAL: No headaches, memory loss, loss of strength, numbness, or tremors  MUSCULOSKELETAL: No joint pain or swelling; No muscle, back, or extremity pain  SKIN: No itching, burning, rashes, or lesions   LYMPH NODES: No enlarged glands  ENDOCRINE: No heat or cold intolerance; No hair loss  PSYCHIATRIC: No depression, anxiety, mood swings, or difficulty sleeping  HEME/LYMPH: No easy bruising, or bleeding gums  ALLERY AND IMMUNOLOGIC: No hives or eczema    Vital Signs Last 24 Hrs  T(C): 36.4 (29 Jul 2024 13:18), Max: 36.4 (29 Jul 2024 05:18)  T(F): 97.6 (29 Jul 2024 13:18), Max: 97.6 (29 Jul 2024 05:18)  HR: 60 (29 Jul 2024 13:18) (60 - 68)  BP: 95/59 (29 Jul 2024 13:18) (95/59 - 120/54)  BP(mean): --  RR: 18 (29 Jul 2024 13:19) (16 - 18)  SpO2: 100% (29 Jul 2024 13:19) (98% - 100%)    Parameters below as of 29 Jul 2024 13:19  Patient On (Oxygen Delivery Method): room air        PHYSICAL EXAM:  GENERAL: NAD, well-groomed, well-developed  HEAD:  Atraumatic, Normocephalic  EYES: EOMI, PERRLA, conjunctiva and sclera clear  ENMT: Moist mucous membranes, Good dentition, No lesions  NECK: Supple, No JVD, Normal thyroid  NERVOUS SYSTEM:  Alert & Oriented X3, Good concentration; Motor Strength 5/5 B/L upper and lower extremities; DTRs 2+ intact and symmetric  CHEST/LUNG: Clear to auscultation bilaterally; No rales, rhonchi, wheezing, or rubs  HEART: Regular rate and rhythm; No murmurs, rubs, or gallops  ABDOMEN: Soft, Nontender, Nondistended; Bowel sounds present  EXTREMITIES:  2+ Peripheral Pulses, No clubbing, cyanosis, or edema  LYMPH: No lymphadenopathy noted  SKIN: No rashes or lesions    Consultant(s) Notes Reviewed:  [x ] YES  [ ] NO  Care Discussed with Consultants/Other Providers [ x] YES  [ ] NO    LABS:                        10.8   8.64  )-----------( 262      ( 28 Jul 2024 07:21 )             35.0     07-28    132<L>  |  95<L>  |  22<H>  ----------------------------<  81  4.4   |  26  |  2.6<H>    Ca    8.1<L>      28 Jul 2024 07:21    TPro  5.9<L>  /  Alb  2.8<L>  /  TBili  0.3  /  DBili  x   /  AST  31  /  ALT  16  /  AlkPhos  146<H>  07-28      Urinalysis Basic - ( 28 Jul 2024 07:21 )    Color: x / Appearance: x / SG: x / pH: x  Gluc: 81 mg/dL / Ketone: x  / Bili: x / Urobili: x   Blood: x / Protein: x / Nitrite: x   Leuk Esterase: x / RBC: x / WBC x   Sq Epi: x / Non Sq Epi: x / Bacteria: x        CAPILLARY BLOOD GLUCOSE          RADIOLOGY & ADDITIONAL TESTS:    Imaging Personally Reviewed:  [ ] YES  [ ] NO

## 2024-07-29 NOTE — PROGRESS NOTE ADULT - ASSESSMENT
Ms. Marrufo is an 88-year-old female (Catholic) PMH HTN, DVT/PE 2018, NICM - HFrEF improved (LVEF 40-45% in 4/23) s/p AICD, RA, ESRD on HD TTS who was brought in from [facility] for SOB, admitted for possible RLL PNA.    Acute Hypoxemic Hypercapnic Respiratory Failure 2/2 CAP vs. HFrEF Exacerbation  -patient on Eliquis, doubt PE  -pt coughing and SOB on presentation   -CXR: B/L opacities and effusions  - Procal 0.23  - MRSA neg   - Trop downtrending   - parainfluenza +  - legionella neg  -BCx NGTD  -monitoring off antibiotics   - incentive spirometry    #ESRD on HD TTS  -HD per nephro    #HTN  #HFrEF dec- now 25% per echo 7/23  #HO PE/DVT  -pt had runs of NSVT on tele   Echo: (7/23)  1. Normal left ventricular internal cavity size.   2. Severely decreased global left ventricular systolic function.   3. Left ventricular ejection fraction, by visual estimation, is 25 to   30%.   4. Moderate concentric left ventricular hypertrophy.   5. Spectral Doppler shows pseudonormal pattern of left ventricular   myocardial filling (Grade II diastolic dysfunction).   6. Normal right ventricular size and function.   7. Severely enlarged left atrium.   8. Mild mitral valve regurgitation.   9. Moderate tricuspid regurgitation.  10. Estimated pulmonary artery systolic pressure is 52.2 mmHg assuming a   right atrial pressure of 15 mmHg, which is consistent with moderate   pulmonary hypertension.  11. Moderate pleural effusion in the left lateral region.  12. Dilatation of the ascending aorta.  13. Compared to study dated 4/9/24, the LVEF has decreased.    -c/w Entresto 24 mg qd (unclear why not on bid dosing)  -c/w amlodipine 5 mg qd  -c/w atorvastatin 20 mg qhs  -c/w metoprolol ER 50 mg qd  -c/w Eliquis 2.5 mg bid  - cardio recs  given dec EF and inc trop:  - Up titrate Metoprolol succinate to 150 mg from 100 mg as needed  - Outpatient follow up with Cardiology   - transitioning to PO lasix 40mg BID per cardio     # Possible RA flair vs osteoarthritis vs pain 2/2 previous trauma   - Hip X-ray: Chronic left proximal femur intertrochanteric fracture deformity with gamma nail without evidence of hardware complication. Moderate/severe degenerative changes of the left hip. Moderate   degenerative change of the right hip.  - CRP 70.5,   - pt noted a fall before coming to the hospital  - pt reporting knee pain  - RF elevated 22  - OP f/u  - PO pred 40 qd    DVT prophylaxis: Eliquis  GI prophylaxis: PPI  Diet: renal  Code status: DNR/DNI   Activity: IAT PT  Dispo: po lasix, pending placement

## 2024-07-30 LAB
ANION GAP SERPL CALC-SCNC: 13 MMOL/L — SIGNIFICANT CHANGE UP (ref 7–14)
BASOPHILS # BLD AUTO: 0.02 K/UL — SIGNIFICANT CHANGE UP (ref 0–0.2)
BASOPHILS NFR BLD AUTO: 0.2 % — SIGNIFICANT CHANGE UP (ref 0–1)
BUN SERPL-MCNC: 55 MG/DL — HIGH (ref 10–20)
CALCIUM SERPL-MCNC: 7.6 MG/DL — LOW (ref 8.4–10.5)
CHLORIDE SERPL-SCNC: 89 MMOL/L — LOW (ref 98–110)
CO2 SERPL-SCNC: 25 MMOL/L — SIGNIFICANT CHANGE UP (ref 17–32)
CREAT SERPL-MCNC: 4.2 MG/DL — CRITICAL HIGH (ref 0.7–1.5)
EGFR: 10 ML/MIN/1.73M2 — LOW
EOSINOPHIL # BLD AUTO: 0.04 K/UL — SIGNIFICANT CHANGE UP (ref 0–0.7)
EOSINOPHIL NFR BLD AUTO: 0.3 % — SIGNIFICANT CHANGE UP (ref 0–8)
GLUCOSE SERPL-MCNC: 87 MG/DL — SIGNIFICANT CHANGE UP (ref 70–99)
HCT VFR BLD CALC: 32.9 % — LOW (ref 37–47)
HGB BLD-MCNC: 10.5 G/DL — LOW (ref 12–16)
IMM GRANULOCYTES NFR BLD AUTO: 0.6 % — HIGH (ref 0.1–0.3)
LYMPHOCYTES # BLD AUTO: 16.7 % — LOW (ref 20.5–51.1)
LYMPHOCYTES # BLD AUTO: 2.03 K/UL — SIGNIFICANT CHANGE UP (ref 1.2–3.4)
MAGNESIUM SERPL-MCNC: 2.2 MG/DL — SIGNIFICANT CHANGE UP (ref 1.8–2.4)
MCHC RBC-ENTMCNC: 30.8 PG — SIGNIFICANT CHANGE UP (ref 27–31)
MCHC RBC-ENTMCNC: 31.9 G/DL — LOW (ref 32–37)
MCV RBC AUTO: 96.5 FL — SIGNIFICANT CHANGE UP (ref 81–99)
MONOCYTES # BLD AUTO: 1.21 K/UL — HIGH (ref 0.1–0.6)
MONOCYTES NFR BLD AUTO: 9.9 % — HIGH (ref 1.7–9.3)
NEUTROPHILS # BLD AUTO: 8.81 K/UL — HIGH (ref 1.4–6.5)
NEUTROPHILS NFR BLD AUTO: 72.3 % — SIGNIFICANT CHANGE UP (ref 42.2–75.2)
NRBC # BLD: 0 /100 WBCS — SIGNIFICANT CHANGE UP (ref 0–0)
PLATELET # BLD AUTO: 365 K/UL — SIGNIFICANT CHANGE UP (ref 130–400)
PMV BLD: 9.2 FL — SIGNIFICANT CHANGE UP (ref 7.4–10.4)
POTASSIUM SERPL-MCNC: 4.8 MMOL/L — SIGNIFICANT CHANGE UP (ref 3.5–5)
POTASSIUM SERPL-SCNC: 4.8 MMOL/L — SIGNIFICANT CHANGE UP (ref 3.5–5)
RBC # BLD: 3.41 M/UL — LOW (ref 4.2–5.4)
RBC # FLD: 13.9 % — SIGNIFICANT CHANGE UP (ref 11.5–14.5)
SODIUM SERPL-SCNC: 127 MMOL/L — LOW (ref 135–146)
WBC # BLD: 12.18 K/UL — HIGH (ref 4.8–10.8)
WBC # FLD AUTO: 12.18 K/UL — HIGH (ref 4.8–10.8)

## 2024-07-30 PROCEDURE — 71045 X-RAY EXAM CHEST 1 VIEW: CPT | Mod: 26

## 2024-07-30 PROCEDURE — 99232 SBSQ HOSP IP/OBS MODERATE 35: CPT

## 2024-07-30 RX ADMIN — TIMOLOL MALEATE 1 DROP(S): 2.5 SOLUTION/ DROPS OPHTHALMIC at 05:45

## 2024-07-30 RX ADMIN — LIDOCAINE 5% 1 PATCH: 5 CREAM TOPICAL at 13:19

## 2024-07-30 RX ADMIN — Medication 25 MILLIGRAM(S): at 06:37

## 2024-07-30 RX ADMIN — ATORVASTATIN CALCIUM 20 MILLIGRAM(S): 40 TABLET, FILM COATED ORAL at 21:48

## 2024-07-30 RX ADMIN — Medication 1 MILLIGRAM(S): at 13:20

## 2024-07-30 RX ADMIN — FUROSEMIDE 40 MILLIGRAM(S): 10 INJECTION, SOLUTION INTRAVENOUS at 13:20

## 2024-07-30 RX ADMIN — Medication 25 MILLIGRAM(S): at 17:23

## 2024-07-30 RX ADMIN — PANTOPRAZOLE SODIUM 40 MILLIGRAM(S): 20 TABLET, DELAYED RELEASE ORAL at 06:38

## 2024-07-30 RX ADMIN — SERTRALINE HYDROCHLORIDE 25 MILLIGRAM(S): 100 TABLET, FILM COATED ORAL at 13:23

## 2024-07-30 RX ADMIN — APIXABAN 2.5 MILLIGRAM(S): 5 TABLET, FILM COATED ORAL at 05:46

## 2024-07-30 RX ADMIN — APIXABAN 2.5 MILLIGRAM(S): 5 TABLET, FILM COATED ORAL at 17:22

## 2024-07-30 RX ADMIN — AMLODIPINE BESYLATE 5 MILLIGRAM(S): 2.5 TABLET ORAL at 05:46

## 2024-07-30 RX ADMIN — FUROSEMIDE 40 MILLIGRAM(S): 10 INJECTION, SOLUTION INTRAVENOUS at 05:46

## 2024-07-30 RX ADMIN — Medication 100 MILLIGRAM(S): at 05:46

## 2024-07-30 RX ADMIN — SACUBITRIL AND VALSARTAN 1 TABLET(S): 49; 51 TABLET, FILM COATED ORAL at 06:37

## 2024-07-30 RX ADMIN — PREDNISONE 40 MILLIGRAM(S): 10 TABLET ORAL at 05:46

## 2024-07-30 NOTE — PROGRESS NOTE ADULT - ASSESSMENT
Ms. Marrufo is an 88-year-old female (Sabianist) PMH HTN, DVT/PE 2018, NICM - HFrEF improved (LVEF 40-45% in 4/23) s/p AICD, RA, ESRD on HD TTS who was brought in from [facility] for SOB, admitted for possible RLL PNA.    Acute Hypoxemic Hypercapnic Respiratory Failure 2/2 CAP vs. HFrEF Exacerbation  -patient on Eliquis, doubt PE  -pt coughing and SOB on presentation   -CXR: B/L opacities and effusions  - Procal 0.23  - MRSA neg   - Trop downtrending   - parainfluenza +  - legionella neg  -BCx NGTD  -monitoring off antibiotics   - incentive spirometry    #ESRD on HD TTS  -HD per nephro  -TTS schedule    #HTN  #HFrEF dec- now 25% per echo 7/23  #HO PE/DVT  -pt had runs of NSVT on tele   Echo: (7/23)  1. Normal left ventricular internal cavity size.   2. Severely decreased global left ventricular systolic function.   3. Left ventricular ejection fraction, by visual estimation, is 25 to   30%.   4. Moderate concentric left ventricular hypertrophy.   5. Spectral Doppler shows pseudonormal pattern of left ventricular   myocardial filling (Grade II diastolic dysfunction).   6. Normal right ventricular size and function.   7. Severely enlarged left atrium.   8. Mild mitral valve regurgitation.   9. Moderate tricuspid regurgitation.  10. Estimated pulmonary artery systolic pressure is 52.2 mmHg assuming a   right atrial pressure of 15 mmHg, which is consistent with moderate   pulmonary hypertension.  11. Moderate pleural effusion in the left lateral region.  12. Dilatation of the ascending aorta.  13. Compared to study dated 4/9/24, the LVEF has decreased.    -c/w Entresto 24 mg qd (unclear why not on bid dosing)  -c/w amlodipine 5 mg qd  -c/w atorvastatin 20 mg qhs  -c/w metoprolol ER 50 mg qd  -c/w Eliquis 2.5 mg bid  - cardio recs  given dec EF and inc trop:  - Up titrate Metoprolol succinate to 150 mg from 100 mg as needed  - Outpatient follow up with Cardiology   - transitioning to PO lasix 40mg BID per cardio   -f/u on Na patient 127, rubenley hypervolemic, f/u on Na in the AM post dialysis session  # Possible RA flair vs osteoarthritis vs pain 2/2 previous trauma   - Hip X-ray: Chronic left proximal femur intertrochanteric fracture deformity with gamma nail without evidence of hardware complication. Moderate/severe degenerative changes of the left hip. Moderate   degenerative change of the right hip.  - CRP 70.5,   - pt noted a fall before coming to the hospital  - pt reporting knee pain  - RF elevated 22  - OP f/u  - PO pred 40 qd    DVT prophylaxis: Eliquis  GI prophylaxis: PPI  Diet: renal  Code status: DNR/DNI   Activity: IAT PT  Dispo: po lasix, pending placement

## 2024-07-30 NOTE — PROGRESS NOTE ADULT - SUBJECTIVE AND OBJECTIVE BOX
RACHEL MARRUFO  88y  Female    Ms. Marrufo is an 88-year-old female (Yarsanism) PMH HTN, DVT/PE 2018, NICM - HFrEF improved (LVEF 40-45% in 4/23) s/p AICD, RA, ESRD on HD TTS who was brought in from [facility] for SOB.    The patient had been having non-productive cough and pleuritic chest pain since the morning of admission. She was being treated for UTI at the NH with Bactrim. Denied fevers, chills, LOC, N/V/D.    On presentation in the ED she was on 10L NRB satting 98% and was then placed on BiPAP for increased WOB.    Vitals in the ED:  /103  HR 98  SpO2 98% on 10L NRB  RR 24  Temp 98 F    Physical Exam:  GEN: appears chronically ill, on BiPAP  RESP: decreased BS bilaterally, R rhonchi  CARD: regular S1, S2, peripheral pulses palpable  ABD: NTND  EXT: no FABRICIO  NEURO: AOx3  PSYCH: cooperative    Labs:  WBC 12K  BUN/Cr 25/3.1    Trop 80  VBG: pCO2 55 pH 7.36 lactate 1.5    Imaging:  CXR: RLL infiltrate, pulmonary vascular congestion on my read  ECG showed LBBB unchanged from prior.    The patient received aztreonam and levofloxacin and was admitted to medicine.        INTERVAL HPI/OVERNIGHT EVENTS:  Patient denies any overnight events, no othopnea, PNDs. No fever, chills, cough, diaphoresis. No chest pain, diaphoresis, nvd. Patient is having good saturation on 2L NC, will try to wean off to room air.      REVIEW OF SYSTEMS:  CONSTITUTIONAL: No fever, weight loss, or fatigue  EYES: No eye pain, visual disturbances, or discharge  ENMT:  No difficulty hearing, tinnitus, vertigo; No sinus or throat pain  NECK: No pain or stiffness  RESPIRATORY: No cough, wheezing, chills or hemoptysis; No shortness of breath  CARDIOVASCULAR: No chest pain, palpitations, dizziness, or leg swelling  GASTROINTESTINAL: No abdominal or epigastric pain. No diarrhea or constipation. No nausea, vomiting, or hematemesis. No melena or hematochezia.  GENITOURINARY: No dysuria, frequency, hematuria, or incontinence  NEUROLOGICAL: No headaches, memory loss, loss of strength, numbness, or tremors  MUSCULOSKELETAL: No joint pain or swelling; No muscle, back, or extremity pain  SKIN: No itching, burning, rashes, or lesions   LYMPH NODES: No enlarged glands  ENDOCRINE: No heat or cold intolerance; No hair loss  PSYCHIATRIC: No depression, anxiety, mood swings, or difficulty sleeping  HEME/LYMPH: No easy bruising, or bleeding gums  ALLERY AND IMMUNOLOGIC: No hives or eczema    Vital Signs Last 24 Hrs  T(C): 36.4 (30 Jul 2024 13:43), Max: 36.4 (29 Jul 2024 19:11)  T(F): 97.6 (30 Jul 2024 13:43), Max: 97.6 (29 Jul 2024 19:11)  HR: 61 (30 Jul 2024 13:43) (54 - 64)  BP: 129/63 (30 Jul 2024 13:43) (103/54 - 129/63)  BP(mean): --  RR: 18 (30 Jul 2024 13:43) (18 - 18)  SpO2: 100% (30 Jul 2024 11:45) (99% - 100%)    Parameters below as of 30 Jul 2024 11:45  Patient On (Oxygen Delivery Method): room air        PHYSICAL EXAM:  GENERAL: NAD, well-groomed, well-developed  HEAD:  Atraumatic, Normocephalic  EYES: EOMI, PERRLA, conjunctiva and sclera clear  ENMT: Moist mucous membranes, Good dentition, No lesions  NECK: Supple, No JVD, Normal thyroid  NERVOUS SYSTEM:  Alert & Oriented X3, Good concentration; Motor Strength 5/5 B/L upper and lower extremities; DTRs 2+ intact and symmetric  CHEST/LUNG: Clear to auscultation bilaterally; No rales, rhonchi, wheezing, or rubs  HEART: Regular rate and rhythm; No murmurs, rubs, or gallops  ABDOMEN: Soft, Nontender, Nondistended; Bowel sounds present  EXTREMITIES:  2+ Peripheral Pulses, No clubbing, cyanosis, or edema  LYMPH: No lymphadenopathy noted  SKIN: No rashes or lesions    Consultant(s) Notes Reviewed:  [x ] YES  [ ] NO  Care Discussed with Consultants/Other Providers [ x] YES  [ ] NO    LABS:                        10.5   12.18 )-----------( 365      ( 30 Jul 2024 06:56 )             32.9     07-30    127<L>  |  89<L>  |  55<H>  ----------------------------<  87  4.8   |  25  |  4.2<HH>    Ca    7.6<L>      30 Jul 2024 06:56  Mg     2.2     07-30        Urinalysis Basic - ( 30 Jul 2024 06:56 )    Color: x / Appearance: x / SG: x / pH: x  Gluc: 87 mg/dL / Ketone: x  / Bili: x / Urobili: x   Blood: x / Protein: x / Nitrite: x   Leuk Esterase: x / RBC: x / WBC x   Sq Epi: x / Non Sq Epi: x / Bacteria: x        CAPILLARY BLOOD GLUCOSE          RADIOLOGY & ADDITIONAL TESTS:    Imaging Personally Reviewed:  [ ] YES  [ ] NO

## 2024-07-30 NOTE — PROGRESS NOTE ADULT - SUBJECTIVE AND OBJECTIVE BOX
Nephrology progress note    THIS IS AN INCOMPLETE NOTE . FULL NOTE TO FOLLOW SHORTLY    Patient is seen and examined, events over the last 24 h noted .    Allergies:  cephalosporins (Angioedema)  Keflex (Swelling)    Hospital Medications:   MEDICATIONS  (STANDING):  amLODIPine   Tablet 5 milliGRAM(s) Oral daily  apixaban 2.5 milliGRAM(s) Oral two times a day  atorvastatin 20 milliGRAM(s) Oral at bedtime  chlorhexidine 2% Cloths 1 Application(s) Topical daily  folic acid 1 milliGRAM(s) Oral daily  furosemide    Tablet 40 milliGRAM(s) Oral two times a day  lidocaine   4% Patch 1 Patch Transdermal daily  metoprolol succinate  milliGRAM(s) Oral daily  pantoprazole    Tablet 40 milliGRAM(s) Oral before breakfast  predniSONE   Tablet 40 milliGRAM(s) Oral daily  sacubitril 24 mG/valsartan 26 mG 1 Tablet(s) Oral <User Schedule>  sertraline 25 milliGRAM(s) Oral daily  timolol 0.5% Solution 1 Drop(s) Both EYES two times a day  traMADol 25 milliGRAM(s) Oral two times a day        VITALS:  T(F): 97.6 (07-30-24 @ 05:17), Max: 97.6 (07-29-24 @ 13:18)  HR: 64 (07-30-24 @ 08:45)  BP: 103/54 (07-30-24 @ 08:45)  RR: 18 (07-30-24 @ 08:45)  SpO2: 100% (07-30-24 @ 08:45)  Wt(kg): --    07-28 @ 07:01  -  07-29 @ 07:00  --------------------------------------------------------  IN: 1090 mL / OUT: 1200 mL / NET: -110 mL    07-29 @ 07:01  -  07-30 @ 07:00  --------------------------------------------------------  IN: 946 mL / OUT: 0 mL / NET: 946 mL          PHYSICAL EXAM:  Constitutional: NAD  HEENT: anicteric sclera, oropharynx clear, MMM  Neck: No JVD  Respiratory: CTAB, no wheezes, rales or rhonchi  Cardiovascular: S1, S2, RRR  Gastrointestinal: BS+, soft, NT/ND  Extremities: No cyanosis or clubbing. No peripheral edema  :  No carlos.   Skin: No rashes    LABS:  07-30    127<L>  |  89<L>  |  55<H>  ----------------------------<  87  4.8   |  25  |  4.2<HH>    Ca    7.6<L>      30 Jul 2024 06:56  Mg     2.2     07-30                            10.5   12.18 )-----------( 365      ( 30 Jul 2024 06:56 )             32.9       Urine Studies:  Urinalysis Basic - ( 30 Jul 2024 06:56 )    Color:  / Appearance:  / SG:  / pH:   Gluc: 87 mg/dL / Ketone:   / Bili:  / Urobili:    Blood:  / Protein:  / Nitrite:    Leuk Esterase:  / RBC:  / WBC    Sq Epi:  / Non Sq Epi:  / Bacteria:           Iron 33, TIBC 147, %sat 22      [04-04-24 @ 06:44]  Ferritin 785      [04-04-24 @ 06:44]  PTH -- (Ca 8.2)      [04-09-24 @ 05:43]   35  PTH -- (Ca 8.2)      [03-24-24 @ 20:00]   138  Vitamin D (25OH) 11      [04-09-24 @ 05:43]  Lipid: chol 144, , HDL 39, LDL --      [03-22-24 @ 06:14]    HBsAb Nonreact      [03-27-24 @ 15:55]  HBsAg Nonreact      [04-01-24 @ 15:52]  HBcAb Nonreact      [04-03-24 @ 16:02]  HCV 0.20, Nonreact      [04-01-24 @ 15:52]    OSIEL: titer 1:160, pattern DFS70      [04-27-22 @ 16:00]  Rheumatoid Factor 22      [07-25-24 @ 05:46]  Free Light Chains: kappa 13.15, lambda 10.99, ratio = 1.20      [12-21 @ 07:39]  Immunofixation Serum:   No Monoclonal Band Identified    Reference Range: None Detected      [12-20-22 @ 10:57]  SPEP Interpretation: Normal Electrophoresis Pattern      [12-20-22 @ 10:57]  Immunofixation Urine: Reference Range: None Detected      [12-25-20 @ 12:35]  UPEP Interpretation: Mild Selective (Glomerular) Proteinuria      [12-25-20 @ 12:35]      RADIOLOGY & ADDITIONAL STUDIES:   Nephrology progress note    Patient is seen and examined, events over the last 24 h noted .  Lying in bed comfortable     Allergies:  cephalosporins (Angioedema)  Keflex (Swelling)    Hospital Medications:   MEDICATIONS  (STANDING):  amLODIPine   Tablet 5 milliGRAM(s) Oral daily  apixaban 2.5 milliGRAM(s) Oral two times a day  atorvastatin 20 milliGRAM(s) Oral at bedtime  folic acid 1 milliGRAM(s) Oral daily  furosemide    Tablet 40 milliGRAM(s) Oral two times a day  lidocaine   4% Patch 1 Patch Transdermal daily  metoprolol succinate  milliGRAM(s) Oral daily  pantoprazole    Tablet 40 milliGRAM(s) Oral before breakfast  predniSONE   Tablet 40 milliGRAM(s) Oral daily  sacubitril 24 mG/valsartan 26 mG 1 Tablet(s) Oral <User Schedule>  sertraline 25 milliGRAM(s) Oral daily  timolol 0.5% Solution 1 Drop(s) Both EYES two times a day  traMADol 25 milliGRAM(s) Oral two times a day        VITALS:  T(F): 97.6 (07-30-24 @ 05:17), Max: 97.6 (07-29-24 @ 13:18)  HR: 64 (07-30-24 @ 08:45)  BP: 103/54 (07-30-24 @ 08:45)  RR: 18 (07-30-24 @ 08:45)  SpO2: 100% (07-30-24 @ 08:45)      07-28 @ 07:01  -  07-29 @ 07:00  --------------------------------------------------------  IN: 1090 mL / OUT: 1200 mL / NET: -110 mL    07-29 @ 07:01  -  07-30 @ 07:00  --------------------------------------------------------  IN: 946 mL / OUT: 0 mL / NET: 946 mL          PHYSICAL EXAM:  Constitutional: NAD  Respiratory: CTAB,  Cardiovascular: S1, S2, RRR  Gastrointestinal: BS+, soft, NT/ND  Extremities: No cyanosis or clubbing. No peripheral edema  :  No carlos.   Skin: No rashes    LABS:  07-30    127<L>  |  89<L>  |  55<H>  ----------------------------<  87  4.8   |  25  |  4.2<HH>    Ca    7.6<L>      30 Jul 2024 06:56  Mg     2.2     07-30                            10.5   12.18 )-----------( 365      ( 30 Jul 2024 06:56 )             32.9       Urine Studies:  Urinalysis Basic - ( 30 Jul 2024 06:56 )    Color:  / Appearance:  / SG:  / pH:   Gluc: 87 mg/dL / Ketone:   / Bili:  / Urobili:    Blood:  / Protein:  / Nitrite:    Leuk Esterase:  / RBC:  / WBC    Sq Epi:  / Non Sq Epi:  / Bacteria:           Iron 33, TIBC 147, %sat 22      [04-04-24 @ 06:44]  Ferritin 785      [04-04-24 @ 06:44]  PTH -- (Ca 8.2)      [04-09-24 @ 05:43]   35  PTH -- (Ca 8.2)      [03-24-24 @ 20:00]   138  Vitamin D (25OH) 11      [04-09-24 @ 05:43]  Lipid: chol 144, , HDL 39, LDL --      [03-22-24 @ 06:14]    HBsAb Nonreact      [03-27-24 @ 15:55]  HBsAg Nonreact      [04-01-24 @ 15:52]  HBcAb Nonreact      [04-03-24 @ 16:02]  HCV 0.20, Nonreact      [04-01-24 @ 15:52]    OSIEL: titer 1:160, pattern DFS70      [04-27-22 @ 16:00]  Rheumatoid Factor 22      [07-25-24 @ 05:46]  Free Light Chains: kappa 13.15, lambda 10.99, ratio = 1.20      [12-21 @ 07:39]  Immunofixation Serum:   No Monoclonal Band Identified    Reference Range: None Detected      [12-20-22 @ 10:57]  SPEP Interpretation: Normal Electrophoresis Pattern      [12-20-22 @ 10:57]  Immunofixation Urine: Reference Range: None Detected      [12-25-20 @ 12:35]  UPEP Interpretation: Mild Selective (Glomerular) Proteinuria      [12-25-20 @ 12:35]      RADIOLOGY & ADDITIONAL STUDIES:

## 2024-07-30 NOTE — PROGRESS NOTE ADULT - ASSESSMENT
88-year-old female (Oriental orthodox) PMH HTN, DVT/PE 2018, NICM - HFrEF improved (LVEF 40-45% in 4/23) s/p AICD, RA, ESRD on HD TTS who was brought in from [facility] for SOB.    # ESRD on HD  - HD today 3h UF 1l as tolerated   - continue HD on TTS schedule, next HD planned for 08/1  - will attempt to increase UF as BP tolerates    # SOB volume overload? CHF better   - d/c amlodipine   - cardiology notes appreciated     #MBD  - Phos at target, not on binders     #Anemia  - h/h noted / no need for ELIJAH yet    #HTN  - change lasix to bumex po 2 q 12  if non oliguric / if oliguric  can d/c         will follow

## 2024-07-31 ENCOUNTER — TRANSCRIPTION ENCOUNTER (OUTPATIENT)
Age: 89
End: 2024-07-31

## 2024-07-31 LAB
ANION GAP SERPL CALC-SCNC: 16 MMOL/L — HIGH (ref 7–14)
BASOPHILS # BLD AUTO: 0.01 K/UL — SIGNIFICANT CHANGE UP (ref 0–0.2)
BASOPHILS NFR BLD AUTO: 0.1 % — SIGNIFICANT CHANGE UP (ref 0–1)
BUN SERPL-MCNC: 37 MG/DL — HIGH (ref 10–20)
CALCIUM SERPL-MCNC: 7.5 MG/DL — LOW (ref 8.4–10.5)
CHLORIDE SERPL-SCNC: 90 MMOL/L — LOW (ref 98–110)
CO2 SERPL-SCNC: 22 MMOL/L — SIGNIFICANT CHANGE UP (ref 17–32)
CREAT SERPL-MCNC: 3.1 MG/DL — HIGH (ref 0.7–1.5)
EGFR: 14 ML/MIN/1.73M2 — LOW
EOSINOPHIL # BLD AUTO: 0.01 K/UL — SIGNIFICANT CHANGE UP (ref 0–0.7)
EOSINOPHIL NFR BLD AUTO: 0.1 % — SIGNIFICANT CHANGE UP (ref 0–8)
GLUCOSE SERPL-MCNC: 140 MG/DL — HIGH (ref 70–99)
HCT VFR BLD CALC: 35.9 % — LOW (ref 37–47)
HGB BLD-MCNC: 11.6 G/DL — LOW (ref 12–16)
IMM GRANULOCYTES NFR BLD AUTO: 0.8 % — HIGH (ref 0.1–0.3)
LYMPHOCYTES # BLD AUTO: 1.06 K/UL — LOW (ref 1.2–3.4)
LYMPHOCYTES # BLD AUTO: 12.4 % — LOW (ref 20.5–51.1)
MAGNESIUM SERPL-MCNC: 1.9 MG/DL — SIGNIFICANT CHANGE UP (ref 1.8–2.4)
MCHC RBC-ENTMCNC: 30.4 PG — SIGNIFICANT CHANGE UP (ref 27–31)
MCHC RBC-ENTMCNC: 32.3 G/DL — SIGNIFICANT CHANGE UP (ref 32–37)
MCV RBC AUTO: 94.2 FL — SIGNIFICANT CHANGE UP (ref 81–99)
MONOCYTES # BLD AUTO: 0.37 K/UL — SIGNIFICANT CHANGE UP (ref 0.1–0.6)
MONOCYTES NFR BLD AUTO: 4.3 % — SIGNIFICANT CHANGE UP (ref 1.7–9.3)
NEUTROPHILS # BLD AUTO: 7.02 K/UL — HIGH (ref 1.4–6.5)
NEUTROPHILS NFR BLD AUTO: 82.3 % — HIGH (ref 42.2–75.2)
NRBC # BLD: 0 /100 WBCS — SIGNIFICANT CHANGE UP (ref 0–0)
PLATELET # BLD AUTO: 372 K/UL — SIGNIFICANT CHANGE UP (ref 130–400)
PMV BLD: 8.7 FL — SIGNIFICANT CHANGE UP (ref 7.4–10.4)
POTASSIUM SERPL-MCNC: 4 MMOL/L — SIGNIFICANT CHANGE UP (ref 3.5–5)
POTASSIUM SERPL-SCNC: 4 MMOL/L — SIGNIFICANT CHANGE UP (ref 3.5–5)
RBC # BLD: 3.81 M/UL — LOW (ref 4.2–5.4)
RBC # FLD: 13.9 % — SIGNIFICANT CHANGE UP (ref 11.5–14.5)
SODIUM SERPL-SCNC: 128 MMOL/L — LOW (ref 135–146)
WBC # BLD: 8.54 K/UL — SIGNIFICANT CHANGE UP (ref 4.8–10.8)
WBC # FLD AUTO: 8.54 K/UL — SIGNIFICANT CHANGE UP (ref 4.8–10.8)

## 2024-07-31 PROCEDURE — 99232 SBSQ HOSP IP/OBS MODERATE 35: CPT

## 2024-07-31 RX ORDER — SACUBITRIL AND VALSARTAN 49; 51 MG/1; MG/1
1 TABLET, FILM COATED ORAL
Refills: 0 | Status: DISCONTINUED | OUTPATIENT
Start: 2024-07-31 | End: 2024-08-01

## 2024-07-31 RX ORDER — METOPROLOL TARTRATE 100 MG
1 TABLET ORAL
Qty: 0 | Refills: 0 | DISCHARGE
Start: 2024-07-31

## 2024-07-31 RX ORDER — SERTRALINE HYDROCHLORIDE 100 MG/1
1 TABLET, FILM COATED ORAL
Qty: 0 | Refills: 0 | DISCHARGE
Start: 2024-07-31

## 2024-07-31 RX ORDER — ATORVASTATIN CALCIUM 40 MG/1
1 TABLET, FILM COATED ORAL
Refills: 0 | DISCHARGE

## 2024-07-31 RX ORDER — ATORVASTATIN CALCIUM 40 MG/1
1 TABLET, FILM COATED ORAL
Qty: 0 | Refills: 0 | DISCHARGE
Start: 2024-07-31

## 2024-07-31 RX ORDER — AMLODIPINE BESYLATE 2.5 MG/1
1 TABLET ORAL
Refills: 0 | DISCHARGE

## 2024-07-31 RX ORDER — APIXABAN 5 MG/1
1 TABLET, FILM COATED ORAL
Qty: 0 | Refills: 0 | DISCHARGE
Start: 2024-07-31

## 2024-07-31 RX ORDER — SACUBITRIL AND VALSARTAN 49; 51 MG/1; MG/1
1 TABLET, FILM COATED ORAL
Qty: 0 | Refills: 0 | DISCHARGE
Start: 2024-07-31

## 2024-07-31 RX ORDER — MULTIVITAMIN/IRON/FOLIC ACID 18MG-0.4MG
1 TABLET ORAL
Qty: 0 | Refills: 0 | DISCHARGE
Start: 2024-07-31

## 2024-07-31 RX ORDER — SACUBITRIL AND VALSARTAN 49; 51 MG/1; MG/1
1 TABLET, FILM COATED ORAL
Refills: 0 | DISCHARGE

## 2024-07-31 RX ORDER — TIMOLOL MALEATE 2.5 MG/ML
1 SOLUTION/ DROPS OPHTHALMIC
Qty: 0 | Refills: 0 | DISCHARGE
Start: 2024-07-31

## 2024-07-31 RX ADMIN — Medication 650 MILLIGRAM(S): at 10:49

## 2024-07-31 RX ADMIN — TIMOLOL MALEATE 1 DROP(S): 2.5 SOLUTION/ DROPS OPHTHALMIC at 17:26

## 2024-07-31 RX ADMIN — AMLODIPINE BESYLATE 5 MILLIGRAM(S): 2.5 TABLET ORAL at 05:42

## 2024-07-31 RX ADMIN — Medication 1 MILLIGRAM(S): at 12:25

## 2024-07-31 RX ADMIN — APIXABAN 2.5 MILLIGRAM(S): 5 TABLET, FILM COATED ORAL at 05:41

## 2024-07-31 RX ADMIN — Medication 100 MILLIGRAM(S): at 05:42

## 2024-07-31 RX ADMIN — Medication 650 MILLIGRAM(S): at 10:05

## 2024-07-31 RX ADMIN — SACUBITRIL AND VALSARTAN 1 TABLET(S): 49; 51 TABLET, FILM COATED ORAL at 18:32

## 2024-07-31 RX ADMIN — CHLORHEXIDINE GLUCONATE 1 APPLICATION(S): 500 CLOTH TOPICAL at 12:22

## 2024-07-31 RX ADMIN — Medication 650 MILLIGRAM(S): at 18:04

## 2024-07-31 RX ADMIN — SERTRALINE HYDROCHLORIDE 25 MILLIGRAM(S): 100 TABLET, FILM COATED ORAL at 12:25

## 2024-07-31 RX ADMIN — PANTOPRAZOLE SODIUM 40 MILLIGRAM(S): 20 TABLET, DELAYED RELEASE ORAL at 05:41

## 2024-07-31 RX ADMIN — FUROSEMIDE 40 MILLIGRAM(S): 10 INJECTION, SOLUTION INTRAVENOUS at 17:27

## 2024-07-31 RX ADMIN — ATORVASTATIN CALCIUM 20 MILLIGRAM(S): 40 TABLET, FILM COATED ORAL at 22:11

## 2024-07-31 RX ADMIN — LIDOCAINE 5% 1 PATCH: 5 CREAM TOPICAL at 12:22

## 2024-07-31 RX ADMIN — FUROSEMIDE 40 MILLIGRAM(S): 10 INJECTION, SOLUTION INTRAVENOUS at 05:42

## 2024-07-31 RX ADMIN — Medication 650 MILLIGRAM(S): at 17:29

## 2024-07-31 RX ADMIN — APIXABAN 2.5 MILLIGRAM(S): 5 TABLET, FILM COATED ORAL at 17:25

## 2024-07-31 RX ADMIN — Medication 25 MILLIGRAM(S): at 05:43

## 2024-07-31 RX ADMIN — SACUBITRIL AND VALSARTAN 1 TABLET(S): 49; 51 TABLET, FILM COATED ORAL at 07:14

## 2024-07-31 RX ADMIN — Medication 25 MILLIGRAM(S): at 18:04

## 2024-07-31 RX ADMIN — Medication 25 MILLIGRAM(S): at 17:26

## 2024-07-31 RX ADMIN — PREDNISONE 40 MILLIGRAM(S): 10 TABLET ORAL at 05:42

## 2024-07-31 RX ADMIN — TIMOLOL MALEATE 1 DROP(S): 2.5 SOLUTION/ DROPS OPHTHALMIC at 07:14

## 2024-07-31 NOTE — PROGRESS NOTE ADULT - SUBJECTIVE AND OBJECTIVE BOX
Nephrology progress note    THIS IS AN INCOMPLETE NOTE . FULL NOTE TO FOLLOW SHORTLY    Patient is seen and examined, events over the last 24 h noted .    Allergies:  cephalosporins (Angioedema)  Keflex (Swelling)    Hospital Medications:   MEDICATIONS  (STANDING):  amLODIPine   Tablet 5 milliGRAM(s) Oral daily  apixaban 2.5 milliGRAM(s) Oral two times a day  atorvastatin 20 milliGRAM(s) Oral at bedtime  chlorhexidine 2% Cloths 1 Application(s) Topical daily  folic acid 1 milliGRAM(s) Oral daily  furosemide    Tablet 40 milliGRAM(s) Oral two times a day  lidocaine   4% Patch 1 Patch Transdermal daily  metoprolol succinate  milliGRAM(s) Oral daily  pantoprazole    Tablet 40 milliGRAM(s) Oral before breakfast  predniSONE   Tablet 40 milliGRAM(s) Oral daily  sacubitril 24 mG/valsartan 26 mG 1 Tablet(s) Oral <User Schedule>  sertraline 25 milliGRAM(s) Oral daily  timolol 0.5% Solution 1 Drop(s) Both EYES two times a day  traMADol 25 milliGRAM(s) Oral two times a day        VITALS:  T(F): 98.1 (07-31-24 @ 05:42), Max: 98.1 (07-31-24 @ 05:42)  HR: 60 (07-31-24 @ 05:42)  BP: 145/71 (07-31-24 @ 05:42)  RR: 18 (07-31-24 @ 05:42)  SpO2: 100% (07-30-24 @ 22:10)  Wt(kg): --    07-29 @ 07:01  -  07-30 @ 07:00  --------------------------------------------------------  IN: 946 mL / OUT: 0 mL / NET: 946 mL    07-30 @ 07:01  -  07-31 @ 07:00  --------------------------------------------------------  IN: 506 mL / OUT: 2000 mL / NET: -1494 mL          PHYSICAL EXAM:  Constitutional: NAD  HEENT: anicteric sclera, oropharynx clear, MMM  Neck: No JVD  Respiratory: CTAB, no wheezes, rales or rhonchi  Cardiovascular: S1, S2, RRR  Gastrointestinal: BS+, soft, NT/ND  Extremities: No cyanosis or clubbing. No peripheral edema  :  No carlos.   Skin: No rashes    LABS:  07-30    127<L>  |  89<L>  |  55<H>  ----------------------------<  87  4.8   |  25  |  4.2<HH>    Ca    7.6<L>      30 Jul 2024 06:56  Mg     2.2     07-30                            10.5   12.18 )-----------( 365      ( 30 Jul 2024 06:56 )             32.9       Urine Studies:  Urinalysis Basic - ( 30 Jul 2024 06:56 )    Color:  / Appearance:  / SG:  / pH:   Gluc: 87 mg/dL / Ketone:   / Bili:  / Urobili:    Blood:  / Protein:  / Nitrite:    Leuk Esterase:  / RBC:  / WBC    Sq Epi:  / Non Sq Epi:  / Bacteria:           Iron 33, TIBC 147, %sat 22      [04-04-24 @ 06:44]  Ferritin 785      [04-04-24 @ 06:44]  PTH -- (Ca 8.2)      [04-09-24 @ 05:43]   35  PTH -- (Ca 8.2)      [03-24-24 @ 20:00]   138  Vitamin D (25OH) 11      [04-09-24 @ 05:43]  Lipid: chol 144, , HDL 39, LDL --      [03-22-24 @ 06:14]    HBsAb Nonreact      [03-27-24 @ 15:55]  HBsAg Nonreact      [04-01-24 @ 15:52]  HBcAb Nonreact      [04-03-24 @ 16:02]  HCV 0.20, Nonreact      [04-01-24 @ 15:52]    OSIEL: titer 1:160, pattern DFS70      [04-27-22 @ 16:00]  Rheumatoid Factor 22      [07-25-24 @ 05:46]  Free Light Chains: kappa 13.15, lambda 10.99, ratio = 1.20      [12-21 @ 07:39]  Immunofixation Serum:   No Monoclonal Band Identified    Reference Range: None Detected      [12-20-22 @ 10:57]  SPEP Interpretation: Normal Electrophoresis Pattern      [12-20-22 @ 10:57]  Immunofixation Urine: Reference Range: None Detected      [12-25-20 @ 12:35]  UPEP Interpretation: Mild Selective (Glomerular) Proteinuria      [12-25-20 @ 12:35]      RADIOLOGY & ADDITIONAL STUDIES:

## 2024-07-31 NOTE — DISCHARGE NOTE PROVIDER - REASON FOR ADMISSION
Alert-The patient is alert, awake and responds to voice. The patient is oriented to time, place, and person. The triage nurse is able to obtain subjective information. dyspnea Pneumonia

## 2024-07-31 NOTE — DISCHARGE NOTE PROVIDER - NSDCMRMEDTOKEN_GEN_ALL_CORE_FT
apixaban 2.5 mg oral tablet: 1 tab(s) orally 2 times a day  atorvastatin 20 mg oral tablet: 1 tab(s) orally once a day (at bedtime)  folic acid 1 mg oral tablet: 1 tab(s) orally once a day  metoprolol succinate 100 mg oral tablet, extended release: 1 tab(s) orally once a day  sacubitril-valsartan 49 mg-51 mg oral tablet: 1 tab(s) orally 2 times a day  sertraline 25 mg oral tablet: 1 tab(s) orally once a day  timolol maleate 0.5% ophthalmic solution: 1 drop(s) to each affected eye 2 times a day

## 2024-07-31 NOTE — DISCHARGE NOTE PROVIDER - NSDCCONDITION_GEN_ALL_CORE
Called patient to advise that patigustavon is due for Annual Exam with Dena Millan MD. I left a message on answering machine   Stable

## 2024-07-31 NOTE — DISCHARGE NOTE PROVIDER - HOSPITAL COURSE
Ms. 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       While here, the patient was managed for acute hypoxemic hypercapnic respiratory failure with symptoms of cough and dyspnea on presentation, increased work of breathing requiring BiPAP. CXR showed bilateral opacities and effusions. She was found to be parainfluenza positive so empiric antibiotics were stopped and she quickly was deescalated to nasal cannula. Blood cultures were never positive and she continued to improve, no longer requiring oxygen and cough resolving.     Initial workup included cardiac testing. New ECHO showed EF of 25% with G2DD, severely decreased global left ventricular systolic function. While on tele, the pt had few runs of NSVT. HF and cardio saw the pt and addressed her medication regimen. They recommended amlodipine, entresto, metoprolol, atorvastatin, and eliquis. The amlodipine was discontinued soon before discharge as the pt's BP was soft. Her entresto was additionally increased. Pt's status is now optimized for dc.     The pt also complained of leg pain (L hip and knee). She was started on a course of PO prednisone given her history of RA with resolution of symptoms.     She will be discharged to NH.    Discussion of discharge plan of care, including discharge diagnoses, medication reconciliation, and follow-ups was conducted with Dr. Davis on 7/31/24 at and discharge was approved.    Ms. Marrufo is an 88-year-old female (Quaker) PMH HTN, DVT/PE 2018, NICM - HFrEF improved (LVEF 40-45% in 4/23) s/p AICD, RA, ESRD on HD TTS who was brought in from [facility] for SOB, admitted for possible RLL PNA.       While here, the patient was managed for acute hypoxemic hypercapnic respiratory failure with symptoms of cough and dyspnea on presentation, increased work of breathing requiring BiPAP. CXR showed bilateral opacities and effusions. She was found to be parainfluenza positive so empiric antibiotics were stopped and she quickly was deescalated to nasal cannula. Blood cultures were never positive and she continued to improve, no longer requiring oxygen and cough resolving.     Initial workup included cardiac testing. New ECHO showed EF of 25% with G2DD, severely decreased global left ventricular systolic function. While on tele, the pt had few runs of NSVT. HF and cardio saw the pt and addressed her medication regimen. They recommended amlodipine, entresto, metoprolol, atorvastatin, and eliquis. The amlodipine was discontinued soon before discharge as the pt's BP was soft. Her entresto was additionally increased. Pt's status is now optimized for dc.     The pt also complained of leg pain (L hip and knee). She was started on a course of PO prednisone given her history of RA with resolution of symptoms.     She will be discharged to NH.    Discussion of discharge plan of care, including discharge diagnoses, medication reconciliation, and follow-ups was conducted with Dr. Colon on 08/01/24 at and discharge was approved.

## 2024-07-31 NOTE — DISCHARGE NOTE PROVIDER - NSDCCPCAREPLAN_GEN_ALL_CORE_FT
PRINCIPAL DISCHARGE DIAGNOSIS  Diagnosis: Acute hypoxemic respiratory failure  Assessment and Plan of Treatment: You came to the hospital because you were feeling short of breath. At first, you required a lot of oxygen assistance but you quickly improved and now are breathing well on your own. You had a viral infection called parainfluenza which could have contributed to your difficulty breathing. You also had an effusion (fluid in the lungs) which could have also contributed to this. This can sometimes happen as a consequence of your heart failure. We treated you with a water pill to help get rid of this fluid.      SECONDARY DISCHARGE DIAGNOSES  Diagnosis: Elevated troponin  Assessment and Plan of Treatment:     Diagnosis: SOB (shortness of breath)  Assessment and Plan of Treatment:     Diagnosis: ESRD on dialysis  Assessment and Plan of Treatment:

## 2024-07-31 NOTE — PROGRESS NOTE ADULT - SUBJECTIVE AND OBJECTIVE BOX
SUBJECTIVE/OVERNIGHT EVENTS  Today is hospital day 11d. This morning patient was seen and examined at bedside, resting comfortably in bed. Feeling well, has no complaints.     PMH    Pneumonia due to infectious organism    No pertinent family history in first degree relatives    No pertinent family history in first degree relatives    FH: arthritis    Chronic kidney disease    Pacemaker    Hypertension    Gout    Diverticulitis    Atrial fibrillation    Diastolic heart failure    Rheumatoid arthritis    DVT, lower extremity    Pulmonary embolism    Chronic HFrEF (heart failure with reduced ejection fraction)    AICD (automatic cardioverter/defibrillator) present    ESRD on dialysis    RLL pneumonia    Artificial pacemaker    History of appendectomy    History of tonsillectomy    History of hysterectomy    H/O hernia repair    CHEST PAIN    90+    Elevated troponin    SOB (shortness of breath)    ESRD on dialysis        MEDICATIONS  STANDING MEDICATIONS  apixaban 2.5 milliGRAM(s) Oral two times a day  atorvastatin 20 milliGRAM(s) Oral at bedtime  chlorhexidine 2% Cloths 1 Application(s) Topical daily  folic acid 1 milliGRAM(s) Oral daily  furosemide    Tablet 40 milliGRAM(s) Oral two times a day  lidocaine   4% Patch 1 Patch Transdermal daily  metoprolol succinate  milliGRAM(s) Oral daily  pantoprazole    Tablet 40 milliGRAM(s) Oral before breakfast  predniSONE   Tablet 40 milliGRAM(s) Oral daily  sacubitril 49 mG/valsartan 51 mG 1 Tablet(s) Oral two times a day  sertraline 25 milliGRAM(s) Oral daily  timolol 0.5% Solution 1 Drop(s) Both EYES two times a day  traMADol 25 milliGRAM(s) Oral two times a day    PRN MEDICATIONS  acetaminophen     Tablet .. 650 milliGRAM(s) Oral every 6 hours PRN    VITALS  T(F): 97.9 (07-31-24 @ 12:10), Max: 98.1 (07-31-24 @ 05:42)  HR: 82 (07-31-24 @ 12:10) (60 - 82)  BP: 125/69 (07-31-24 @ 12:10) (103/51 - 145/71)  RR: 18 (07-31-24 @ 12:10) (18 - 18)  SpO2: 100% (07-30-24 @ 22:10) (100% - 100%)    PHYSICAL EXAM  GENERAL  ( x ) NAD, lying in bed comfortably     (  ) obtunded     (  ) lethargic     (  ) somnolent    HEAD  ( x ) Atraumatic     (  ) hematoma     (  ) laceration (specify location:       )     NECK  (x  ) Supple     (  ) neck stiffness     (  ) nuchal rigidity     (  )  no JVD     (  ) JVD present ( -- cm)    HEART  Rate -->  ( x ) normal rate    (  ) bradycardic    (  ) tachycardic  Rhythm -->  ( x ) regular    (  ) regularly irregular    (  ) irregularly irregular  Murmurs -->  (  ) normal s1/s2    (  ) systolic murmur    (  ) diastolic murmur    (  ) continuous murmur     (  ) S3 present    (  ) S4 present    LUNGS  ( x )Unlabored respirations     (  ) tachypnea  (  ) B/L air entry     (  ) decreased breath sounds in:  (location     )    (  ) no adventitious sound     (  ) crackles     (  ) wheezing      (  ) rhonchi      (specify location:       )  (  ) chest wall tenderness (specify location:       )    ABDOMEN  (x  ) Soft     (  ) tense   |   (  ) nondistended     (  ) distended   |   (  ) +BS     (  ) hypoactive bowel sounds     (  ) hyperactive bowel sounds  (  ) nontender     (  ) RUQ tenderness     (  ) RLQ tenderness     (  ) LLQ tenderness     (  ) epigastric tenderness     (  ) diffuse tenderness  (  ) Splenomegaly      (  ) Hepatomegaly      (  ) Jaundice     (  ) ecchymosis     EXTREMITIES  ( x ) Normal     (  ) Rash     (  ) ecchymosis     (  ) varicose veins      (  ) pitting edema     (  ) non-pitting edema   (  ) ulceration     (  ) gangrene:     (location:     )    NERVOUS SYSTEM  ( x ) A&Ox3     (  ) confused     (  ) lethargic  CN II-XII:     (  ) Intact     (  ) focal deficits  (Specify:     )   Upper extremities:     (  ) strength X/5     (  ) focal deficit (specify:    )  Lower extremities:     (  ) strength  X/5    (  ) focal deficit (specify:    )    SKIN  ( x ) No rashes or lesions     (  ) maculopapular rash     (  ) pustules     (  ) vesicles     (  ) ulcer     (  ) ecchymosis     (specify location:     )        LABS             11.6   8.54  )-----------( 372      ( 07-31-24 @ 08:58 )             35.9     128  |  90  |  37  -------------------------<  140   07-31-24 @ 08:58  4.0  |  22  |  3.1    Ca      7.5     07-31-24 @ 08:58  Mg     1.9     07-31-24 @ 08:58          Urinalysis Basic - ( 31 Jul 2024 08:58 )    Color: x / Appearance: x / SG: x / pH: x  Gluc: 140 mg/dL / Ketone: x  / Bili: x / Urobili: x   Blood: x / Protein: x / Nitrite: x   Leuk Esterase: x / RBC: x / WBC x   Sq Epi: x / Non Sq Epi: x / Bacteria: x

## 2024-07-31 NOTE — DISCHARGE NOTE PROVIDER - NSFOLLOWUPCLINICS_GEN_ALL_ED_FT
[facility]  Cardiology  [address] [place] [address], [address]  [place] [address]  Phone: [phone]  Fax: [phone]  Follow Up Time: 2 weeks

## 2024-07-31 NOTE — DISCHARGE NOTE PROVIDER - CARE PROVIDERS DIRECT ADDRESSES
,DirectAddress_Unknown,[email or web address]

## 2024-07-31 NOTE — DISCHARGE NOTE PROVIDER - CARE PROVIDER_API CALL
[facility]  [address] [place] [address]  Phone: [phone]  Fax: [phone])-  Follow Up Time: 1 week    Alan Costa  Nephrology  [address] [place] [id number]  Phone: [phone]  Fax: [phone]  Follow Up Time: 2 weeks

## 2024-07-31 NOTE — PROGRESS NOTE ADULT - SUBJECTIVE AND OBJECTIVE BOX
RACHEL SUMMERS  88y  Female      Patient is a 88y old  Female who presents with a chief complaint of Pneumonia (31 Jul 2024 13:31)      INTERVAL HPI/OVERNIGHT EVENTS:  She feels ok, no new complaints.   Vital Signs Last 24 Hrs  T(C): 36.6 (31 Jul 2024 12:10), Max: 36.7 (30 Jul 2024 20:20)  T(F): 97.9 (31 Jul 2024 12:10), Max: 98.1 (31 Jul 2024 05:42)  HR: 82 (31 Jul 2024 12:10) (60 - 82)  BP: 125/69 (31 Jul 2024 12:10) (103/51 - 145/71)  BP(mean): --  RR: 18 (31 Jul 2024 12:10) (18 - 18)  SpO2: 100% (30 Jul 2024 22:10) (100% - 100%)    Parameters below as of 30 Jul 2024 22:10  Patient On (Oxygen Delivery Method): room air          07-30-24 @ 07:01  -  07-31-24 @ 07:00  --------------------------------------------------------  IN: 506 mL / OUT: 2000 mL / NET: -1494 mL            Consultant(s) Notes Reviewed:  [x ] YES  [ ] NO          MEDICATIONS  (STANDING):  apixaban 2.5 milliGRAM(s) Oral two times a day  atorvastatin 20 milliGRAM(s) Oral at bedtime  chlorhexidine 2% Cloths 1 Application(s) Topical daily  folic acid 1 milliGRAM(s) Oral daily  furosemide    Tablet 40 milliGRAM(s) Oral two times a day  lidocaine   4% Patch 1 Patch Transdermal daily  metoprolol succinate  milliGRAM(s) Oral daily  pantoprazole    Tablet 40 milliGRAM(s) Oral before breakfast  predniSONE   Tablet 40 milliGRAM(s) Oral daily  sacubitril 49 mG/valsartan 51 mG 1 Tablet(s) Oral two times a day  sertraline 25 milliGRAM(s) Oral daily  timolol 0.5% Solution 1 Drop(s) Both EYES two times a day  traMADol 25 milliGRAM(s) Oral two times a day    MEDICATIONS  (PRN):  acetaminophen     Tablet .. 650 milliGRAM(s) Oral every 6 hours PRN Moderate Pain (4 - 6)      LABS                          11.6   8.54  )-----------( 372      ( 31 Jul 2024 08:58 )             35.9     07-31    128<L>  |  90<L>  |  37<H>  ----------------------------<  140<H>  4.0   |  22  |  3.1<H>    Ca    7.5<L>      31 Jul 2024 08:58  Mg     1.9     07-31        Urinalysis Basic - ( 31 Jul 2024 08:58 )    Color: x / Appearance: x / SG: x / pH: x  Gluc: 140 mg/dL / Ketone: x  / Bili: x / Urobili: x   Blood: x / Protein: x / Nitrite: x   Leuk Esterase: x / RBC: x / WBC x   Sq Epi: x / Non Sq Epi: x / Bacteria: x        Lactate Trend        CAPILLARY BLOOD GLUCOSE            RADIOLOGY & ADDITIONAL TESTS:    Imaging Personally Reviewed:  [ ] YES  [ ] NO    HEALTH ISSUES - PROBLEM Dx:          PHYSICAL EXAM:  GENERAL: NAD, well-developed.  HEAD:  Atraumatic, Normocephalic.  EYES: EOMI, PERRLA, conjunctiva and sclera clear.  NECK: Supple, increased JVP  CHEST/LUNG: scattered rales  HEART: Regular rate and rhythm; S1 S2.   ABDOMEN: Soft, Nontender, Nondistended; Bowel sounds present.  EXTREMITIES:  2+ Peripheral Pulses, No clubbing, cyanosis, or edema.  PSYCH: AAOx3.  NEUROLOGY: non-focal.  SKIN: No rashes or lesions.

## 2024-07-31 NOTE — PROGRESS NOTE ADULT - ASSESSMENT
Ms. Marrufo is an 88-year-old female (Buddhism) PMH HTN, DVT/PE 2018, NICM - HFrEF improved (LVEF 40-45% in 4/23) s/p AICD, RA, ESRD on HD TTS who was brought in from [facility] for SOB, admitted for possible RLL PNA.    #Acute Hypoxemic Hypercapnic Respiratory Failure 2/2 CAP vs. HFrEF Exacerbation  - patient on Eliquis, doubt PE  - pt coughing and SOB on presentation   - CXR: B/L opacities and effusions  - Procal 0.23  - MRSA neg   - Trop downtrending   - parainfluenza +  - legionella neg  - BCx NGTD  - monitoring off antibiotics   - incentive spirometry    #ESRD on HD TTS  -HD per nephro  -TTS schedule    #HTN  #HFrEF dec- now 25% per echo 7/23  #HO PE/DVT  -pt had runs of NSVT on tele   Echo: (7/23)  1. Normal left ventricular internal cavity size.   2. Severely decreased global left ventricular systolic function.   3. Left ventricular ejection fraction, by visual estimation, is 25 to 30%.   4. Moderate concentric left ventricular hypertrophy.   5. Spectral Doppler shows pseudonormal pattern of left ventricular   myocardial filling (Grade II diastolic dysfunction).   6. Normal right ventricular size and function.   7. Severely enlarged left atrium.   8. Mild mitral valve regurgitation.   9. Moderate tricuspid regurgitation.  10. Estimated pulmonary artery systolic pressure is 52.2 mmHg assuming a   right atrial pressure of 15 mmHg, which is consistent with moderate   pulmonary hypertension.  11. Moderate pleural effusion in the left lateral region.  12. Dilatation of the ascending aorta.  13. Compared to study dated 4/9/24, the LVEF has decreased.  - meds per cardio recs  - Up titrate Metoprolol succinate to 150 mg from 100 mg as needed  - Outpatient follow up with Cardiology   - PO lasix 40mg BID  - d/c'ed amlodipine - BP soft + HF hx  - increased entresto to 49/51mg BID      # Possible RA flair vs osteoarthritis vs pain 2/2 previous trauma   - Hip X-ray: Chronic left proximal femur intertrochanteric fracture deformity with gamma nail without evidence of hardware complication. Moderate/severe degenerative changes of the left hip. Moderate   degenerative change of the right hip.  - CRP 70.5,   - pt noted a fall before coming to the hospital  - pt reporting knee pain  - RF elevated 22  - OP f/u  - PO pred 40 qd    DVT prophylaxis: Eliquis  GI prophylaxis: PPI  Diet: renal  Code status: DNR/DNI   Activity: IAT PT  Dispo: pending placement

## 2024-07-31 NOTE — PROGRESS NOTE ADULT - ASSESSMENT
88-year-old female (Buddhist) PMH HTN, DVT/PE 2018, NICM - HFrEF improved (LVEF 40-45% in 4/23) s/p AICD, RA, ESRD on HD TTS who was brought in from [facility] for SOB, admitted for possible RLL PNA.    A/P:   Acute Hypoxemic Hypercapnic Respiratory Failure:   Acute HFrEF   Pneumonia likely gram negative:   Non ischemic Cardiomyopathy: s/p AICD  Patient with SOB, cough,  CXR showed bilateral lower lung opacities and effusion.   Echo 7/23/24 showed LVEF 25-30, grade II diastolic dysfunction, mod TR, mod pulmonary HTN.   s/p Lasix 40mg IV BID (still making a little urine). Continue Lasix 40mg po BID  Continue Entresto (increase the dose to 49/51mg BID, the dose goal is 97/101)and Metoprolol.   Seen by cardiology, recommended medical management.   Patient with cough, leukocytosis, could be pneumonia, Nasal MRSA negative  ID recommended Levaquin 500mg post HD for 5 days, end on 7/24.   s/p BiPAP, incentive spirometry    ESRD on HD TTS  Continue HD.     HTN  Continue Entresto and Metoprolol.     Left Knee pain:   History of OA and Rheumatoid Arthritis   Knee X ray showed OA changes, mod effusion.   ESR, 105, CRP 75, RF 22 not impressive, Can be Prednisone 20mg for 5 days.     history PE/DVT  RA    DVT prophylaxis: Eliquis  GI prophylaxis: PPI    Code status: DNR/DNI   #Progress Note Handoff:  Pending (specify):  SNF  Family discussion: with her son at bedside.   Disposition: from [facility]

## 2024-07-31 NOTE — PROGRESS NOTE ADULT - SUBJECTIVE AND OBJECTIVE BOX
seen and examined   24 h events noted   no distress         PAST HISTORY  --------------------------------------------------------------------------------  No significant changes to PMH, PSH, FHx, SHx, unless otherwise noted    ALLERGIES & MEDICATIONS  --------------------------------------------------------------------------------  Allergies    cephalosporins (Angioedema)  Keflex (Swelling)    Intolerances      Standing Inpatient Medications  amLODIPine   Tablet 5 milliGRAM(s) Oral daily  apixaban 2.5 milliGRAM(s) Oral two times a day  atorvastatin 20 milliGRAM(s) Oral at bedtime  chlorhexidine 2% Cloths 1 Application(s) Topical daily  folic acid 1 milliGRAM(s) Oral daily  furosemide    Tablet 40 milliGRAM(s) Oral two times a day  lidocaine   4% Patch 1 Patch Transdermal daily  metoprolol succinate  milliGRAM(s) Oral daily  pantoprazole    Tablet 40 milliGRAM(s) Oral before breakfast  predniSONE   Tablet 40 milliGRAM(s) Oral daily  sacubitril 24 mG/valsartan 26 mG 1 Tablet(s) Oral <User Schedule>  sertraline 25 milliGRAM(s) Oral daily  timolol 0.5% Solution 1 Drop(s) Both EYES two times a day  traMADol 25 milliGRAM(s) Oral two times a day    PRN Inpatient Medications  acetaminophen     Tablet .. 650 milliGRAM(s) Oral every 6 hours PRN        VITALS/PHYSICAL EXAM  --------------------------------------------------------------------------------  T(C): 36.7 (07-31-24 @ 05:42), Max: 36.7 (07-30-24 @ 20:20)  HR: 60 (07-31-24 @ 05:42) (54 - 70)  BP: 145/71 (07-31-24 @ 05:42) (103/51 - 145/71)  RR: 18 (07-31-24 @ 05:42) (18 - 18)  SpO2: 100% (07-30-24 @ 22:10) (100% - 100%)  Wt(kg): --        07-30-24 @ 07:01  -  07-31-24 @ 07:00  --------------------------------------------------------  IN: 506 mL / OUT: 2000 mL / NET: -1494 mL      Physical Exam:  	Gen: NAD  	Pulm: decrease BS  B/L  	CV: S1S2; no rub  	Abd: +distended  	LE:  no edema  	Vascular access:    LABS/STUDIES  --------------------------------------------------------------------------------              11.6   8.54  >-----------<  372      [07-31-24 @ 08:58]              35.9     127  |  89  |  55  ----------------------------<  87      [07-30-24 @ 06:56]  4.8   |  25  |  4.2        Ca     7.6     [07-30-24 @ 06:56]      Mg     2.2     [07-30-24 @ 06:56]    Creatinine Trend:  SCr 4.2 [07-30 @ 06:56]  SCr 2.6 [07-28 @ 07:21]  SCr 3.3 [07-27 @ 07:55]  SCr 2.6 [07-26 @ 07:26]  SCr 3.7 [07-25 @ 05:46]    Urinalysis - [07-30-24 @ 06:56]      Color  / Appearance  / SG  / pH       Gluc 87 / Ketone   / Bili  / Urobili        Blood  / Protein  / Leuk Est  / Nitrite       RBC  / WBC  / Hyaline  / Gran  / Sq Epi  / Non Sq Epi  / Bacteria       Iron 33, TIBC 147, %sat 22      [04-04-24 @ 06:44]  Ferritin 785      [04-04-24 @ 06:44]  PTH -- (Ca 8.2)      [04-09-24 @ 05:43]   35  PTH -- (Ca 8.2)      [03-24-24 @ 20:00]   138  Vitamin D (25OH) 11      [04-09-24 @ 05:43]  Lipid: chol 144, , HDL 39, LDL --      [03-22-24 @ 06:14]      Rheumatoid Factor 22      [07-25-24 @ 05:46]

## 2024-07-31 NOTE — PROGRESS NOTE ADULT - ASSESSMENT
88-year-old female (Christianity) PMH HTN, DVT/PE 2018, NICM - HFrEF improved (LVEF 40-45% in 4/23) s/p AICD, RA, ESRD on HD TTS [facility]  # ESRD on HD  - HD  in am 3h UF 1l as tolerated   - repeat sodium level   - will attempt to increase UF as BP tolerates  # SOB volume overload? CHF better   - d/c amlodipine / on entresto / on lasic continue if non oliguric   #MBD    last Phos at target, not on binders   #Anemia  - h/h noted / no need for ELIJAH   #HTN/ controlled   will follow

## 2024-07-31 NOTE — DISCHARGE NOTE PROVIDER - PROVIDER TOKENS
PROVIDER:[TOKEN:[[id number]],FOLLOWUP:[1 week]],PROVIDER:[TOKEN:[[id number]],FOLLOWUP:[2 weeks]]

## 2024-08-01 ENCOUNTER — TRANSCRIPTION ENCOUNTER (OUTPATIENT)
Age: 89
End: 2024-08-01

## 2024-08-01 VITALS — HEART RATE: 60 BPM | DIASTOLIC BLOOD PRESSURE: 67 MMHG | SYSTOLIC BLOOD PRESSURE: 120 MMHG

## 2024-08-01 RX ORDER — METOPROLOL TARTRATE 100 MG
1 TABLET ORAL
Qty: 30 | Refills: 0
Start: 2024-08-01 | End: 2024-08-30

## 2024-08-01 RX ORDER — APIXABAN 5 MG/1
1 TABLET, FILM COATED ORAL
Qty: 60 | Refills: 0
Start: 2024-08-01 | End: 2024-08-30

## 2024-08-01 RX ORDER — MULTIVITAMIN/IRON/FOLIC ACID 18MG-0.4MG
1 TABLET ORAL
Qty: 30 | Refills: 0
Start: 2024-08-01 | End: 2024-08-30

## 2024-08-01 RX ORDER — SERTRALINE HYDROCHLORIDE 100 MG/1
1 TABLET, FILM COATED ORAL
Qty: 30 | Refills: 0
Start: 2024-08-01 | End: 2024-08-30

## 2024-08-01 RX ORDER — TIMOLOL MALEATE 2.5 MG/ML
1 SOLUTION/ DROPS OPHTHALMIC
Qty: 2 | Refills: 0
Start: 2024-08-01 | End: 2024-08-30

## 2024-08-01 RX ORDER — SACUBITRIL AND VALSARTAN 49; 51 MG/1; MG/1
1 TABLET, FILM COATED ORAL
Qty: 60 | Refills: 0
Start: 2024-08-01 | End: 2024-08-30

## 2024-08-01 RX ORDER — ATORVASTATIN CALCIUM 40 MG/1
1 TABLET, FILM COATED ORAL
Qty: 30 | Refills: 0
Start: 2024-08-01 | End: 2024-08-30

## 2024-08-01 RX ADMIN — APIXABAN 2.5 MILLIGRAM(S): 5 TABLET, FILM COATED ORAL at 17:27

## 2024-08-01 RX ADMIN — LIDOCAINE 5% 1 PATCH: 5 CREAM TOPICAL at 18:39

## 2024-08-01 RX ADMIN — APIXABAN 2.5 MILLIGRAM(S): 5 TABLET, FILM COATED ORAL at 05:31

## 2024-08-01 RX ADMIN — Medication 100 MILLIGRAM(S): at 05:31

## 2024-08-01 RX ADMIN — SERTRALINE HYDROCHLORIDE 25 MILLIGRAM(S): 100 TABLET, FILM COATED ORAL at 12:53

## 2024-08-01 RX ADMIN — Medication 1 MILLIGRAM(S): at 12:53

## 2024-08-01 RX ADMIN — SACUBITRIL AND VALSARTAN 1 TABLET(S): 49; 51 TABLET, FILM COATED ORAL at 06:04

## 2024-08-01 RX ADMIN — PANTOPRAZOLE SODIUM 40 MILLIGRAM(S): 20 TABLET, DELAYED RELEASE ORAL at 05:44

## 2024-08-01 RX ADMIN — CHLORHEXIDINE GLUCONATE 1 APPLICATION(S): 500 CLOTH TOPICAL at 12:54

## 2024-08-01 RX ADMIN — PREDNISONE 40 MILLIGRAM(S): 10 TABLET ORAL at 05:31

## 2024-08-01 RX ADMIN — LIDOCAINE 5% 1 PATCH: 5 CREAM TOPICAL at 12:53

## 2024-08-01 RX ADMIN — Medication 25 MILLIGRAM(S): at 17:27

## 2024-08-01 RX ADMIN — TIMOLOL MALEATE 1 DROP(S): 2.5 SOLUTION/ DROPS OPHTHALMIC at 17:26

## 2024-08-01 RX ADMIN — SACUBITRIL AND VALSARTAN 1 TABLET(S): 49; 51 TABLET, FILM COATED ORAL at 17:27

## 2024-08-01 RX ADMIN — FUROSEMIDE 40 MILLIGRAM(S): 10 INJECTION, SOLUTION INTRAVENOUS at 13:11

## 2024-08-01 RX ADMIN — FUROSEMIDE 40 MILLIGRAM(S): 10 INJECTION, SOLUTION INTRAVENOUS at 05:31

## 2024-08-01 RX ADMIN — Medication 650 MILLIGRAM(S): at 08:08

## 2024-08-01 RX ADMIN — TIMOLOL MALEATE 1 DROP(S): 2.5 SOLUTION/ DROPS OPHTHALMIC at 05:30

## 2024-08-01 RX ADMIN — Medication 25 MILLIGRAM(S): at 05:30

## 2024-08-01 NOTE — PROGRESS NOTE ADULT - ASSESSMENT
88-year-old female (Zoroastrian) PMH HTN, DVT/PE 2018, NICM - HFrEF improved (LVEF 40-45% in 4/23) s/p AICD, RA, ESRD on HD TTS who was brought in from [facility] for SOB.  # ESRD on HD  - HD  today 3h UF 1l as tolerated   - repeat sodium level after HD   - will attempt to increase UF as BP tolerates  # SOB volume overload? CHF better   - off  amlodipine / on entresto / on lasix continue    #MBD    last Phos at target, not on binders   #Anemia  - h/h noted / no need for ELIJAH   #HTN/ controlled   will follow   ? dispo

## 2024-08-01 NOTE — PROGRESS NOTE ADULT - ASSESSMENT
Ms. Marrufo is an 88-year-old female ([employer]) PMH HTN, DVT/PE 2018, NICM - HFrEF improved (LVEF 40-45% in 4/23) s/p AICD, RA, ESRD on HD TTS who was brought in from [facility] for SOB, admitted for possible RLL PNA.    Acute Hypoxemic Hypercapnic Respiratory Failure 2/2 CAP vs. HFrEF Exacerbation  -patient on Eliquis, doubt PE  -pt coughing and SOB on presentation   -CXR: B/L opacities and effusions  - Procal 0.23  - MRSA neg   - Trop downtrending   - parainfluenza +  - legionella neg  -BCx NGTD  -monitoring off antibiotics   - incentive spirometry    #ESRD on HD TTS  -HD per nephro  -TTS schedule    #HTN  #HFrEF dec- now 25% per echo 7/23  #HO PE/DVT  -pt had runs of NSVT on tele   Echo: (7/23)  1. Normal left ventricular internal cavity size.   2. Severely decreased global left ventricular systolic function.   3. Left ventricular ejection fraction, by visual estimation, is 25 to   30%.   4. Moderate concentric left ventricular hypertrophy.   5. Spectral Doppler shows pseudonormal pattern of left ventricular   myocardial filling (Grade II diastolic dysfunction).   6. Normal right ventricular size and function.   7. Severely enlarged left atrium.   8. Mild mitral valve regurgitation.   9. Moderate tricuspid regurgitation.  10. Estimated pulmonary artery systolic pressure is 52.2 mmHg assuming a   right atrial pressure of 15 mmHg, which is consistent with moderate   pulmonary hypertension.  11. Moderate pleural effusion in the left lateral region.  12. Dilatation of the ascending aorta.  13. Compared to study dated 4/9/24, the LVEF has decreased.    -c/w Entresto 24 mg qd (unclear why not on bid dosing)  -c/w amlodipine 5 mg qd  -c/w atorvastatin 20 mg qhs  -c/w metoprolol ER 50 mg qd  -c/w Eliquis 2.5 mg bid  - cardio recs  given dec EF and inc trop:  - Up titrate Metoprolol succinate to 150 mg from 100 mg as needed  - Outpatient follow up with Cardiology   - transitioning to PO lasix 40mg BID per cardio   -f/u on Na patient 127, rubenley hypervolemic, f/u on Na in the AM post dialysis session  # Possible RA flair vs osteoarthritis vs pain 2/2 previous trauma   - Hip X-ray: Chronic left proximal femur intertrochanteric fracture deformity with gamma nail without evidence of hardware complication. Moderate/severe degenerative changes of the left hip. Moderate   degenerative change of the right hip.  - CRP 70.5,   - pt noted a fall before coming to the hospital  - pt reporting knee pain  - RF elevated 22  - OP f/u  - PO pred 40 qd    DVT prophylaxis: Eliquis  GI prophylaxis: PPI  Diet: renal  Code status: DNR/DNI   Activity: IAT PT  Dispo: going to [place], send meds to [facility] in [place]

## 2024-08-01 NOTE — PROGRESS NOTE ADULT - ATTENDING COMMENTS
88-year-old female (Jainism) PMH HTN, DVT/PE 2018, NICM - HFrEF improved (LVEF 40-45% in 4/23) s/p AICD, RA, ESRD on HD TTS who was brought in from [facility] for SOB, admitted for possible RLL PNA and HFrEF. Tx with course of IV antibiotics. Fluid removal through HD with improvement in symptoms. Medically stable for DC to [facility] today.
Non ischemic cardiomyopathy with EF drop from 40 to 25 % per cardiology cont gdmt and increase metoprolol   Pain of hip and knee improved and patient now is eating better   will need rheum follow up as outpatient   Hyponatremia in the setting of dialysis cont to monitor   DW team in details
I personally examined the patient and edited the assessment and plan as above.

## 2024-08-01 NOTE — DISCHARGE NOTE NURSING/CASE MANAGEMENT/SOCIAL WORK - PATIENT PORTAL LINK FT
You can access the FollowMyHealth Patient Portal offered by [facility] by registering at the following website: [email or web address]. By joining [employer]’s FollowMyHealth portal, you will also be able to view your health information using other applications (apps) compatible with our system.

## 2024-08-01 NOTE — PROGRESS NOTE ADULT - SUBJECTIVE AND OBJECTIVE BOX
SUBJECTIVE/OVERNIGHT EVENTS  Today is hospital day 12d. This morning patient was seen and examined at bedside, resting comfortably in bed. No acute or major events overnight.     PMH:     Pneumonia due to infectious organism    No pertinent family history in first degree relatives    No pertinent family history in first degree relatives    FH: arthritis    Handoff    MEWS Score    Prior    Chronic kidney disease    Pacemaker    Hypertension    Gout    Diverticulitis    Atrial fibrillation    Diastolic heart failure    Rheumatoid arthritis    DVT, lower extremity    Pulmonary embolism    Chronic HFrEF (heart failure with reduced ejection fraction)    AICD (automatic cardioverter/defibrillator) present    ESRD on dialysis    RLL pneumonia    Acute hypoxemic respiratory failure    Artificial pacemaker    History of appendectomy    History of tonsillectomy    History of hysterectomy    H/O hernia repair    CHEST PAIN    90+    Elevated troponin    SOB (shortness of breath)    ESRD on dialysis    SysAdmin_VisitLink        MEDICATIONS  STANDING MEDICATIONS  apixaban 2.5 milliGRAM(s) Oral two times a day  atorvastatin 20 milliGRAM(s) Oral at bedtime  chlorhexidine 2% Cloths 1 Application(s) Topical daily  folic acid 1 milliGRAM(s) Oral daily  furosemide    Tablet 40 milliGRAM(s) Oral two times a day  lidocaine   4% Patch 1 Patch Transdermal daily  metoprolol succinate  milliGRAM(s) Oral daily  pantoprazole    Tablet 40 milliGRAM(s) Oral before breakfast  predniSONE   Tablet 40 milliGRAM(s) Oral daily  sacubitril 49 mG/valsartan 51 mG 1 Tablet(s) Oral two times a day  sertraline 25 milliGRAM(s) Oral daily  timolol 0.5% Solution 1 Drop(s) Both EYES two times a day  traMADol 25 milliGRAM(s) Oral two times a day    PRN MEDICATIONS  acetaminophen     Tablet .. 650 milliGRAM(s) Oral every 6 hours PRN    VITALS  T(F): 98 (08-01-24 @ 12:58), Max: 98.1 (08-01-24 @ 06:22)  HR: 60 (08-01-24 @ 12:58) (59 - 66)  BP: 131/73 (08-01-24 @ 12:58) (111/64 - 132/67)  RR: 19 (08-01-24 @ 12:58) (17 - 19)  SpO2: 98% (08-01-24 @ 12:58) (98% - 100%)    PHYSICAL EXAM  GENERAL  ( x ) NAD, lying in bed comfortably     (  ) obtunded     (  ) lethargic     (  ) somnolent    HEAD  (x  ) Atraumatic     (  ) hematoma     (  ) laceration (specify location:       )     NECK  ( x ) Supple     (  ) neck stiffness     (  ) nuchal rigidity     (  )  no JVD     (  ) JVD present ( -- cm)    HEART  Rate -->  ( x ) normal rate    (  ) bradycardic    (  ) tachycardic  Rhythm -->  ( x ) regular    (  ) regularly irregular    (  ) irregularly irregular  Murmurs -->  (  ) normal s1/s2    (  ) systolic murmur    (  ) diastolic murmur    (  ) continuous murmur     (  ) S3 present    (  ) S4 present    LUNGS  ( x )Unlabored respirations     (  ) tachypnea  (  ) B/L air entry     (  ) decreased breath sounds in:  (location     )    (  ) no adventitious sound     (  ) crackles     (  ) wheezing      (  ) rhonchi      (specify location:       )  (  ) chest wall tenderness (specify location:       )    ABDOMEN  (x  ) Soft     (  ) tense   |   (  ) nondistended     (  ) distended   |   (  ) +BS     (  ) hypoactive bowel sounds     (  ) hyperactive bowel sounds  (  ) nontender     (  ) RUQ tenderness     (  ) RLQ tenderness     (  ) LLQ tenderness     (  ) epigastric tenderness     (  ) diffuse tenderness  (  ) Splenomegaly      (  ) Hepatomegaly      (  ) Jaundice     (  ) ecchymosis     EXTREMITIES  (  x) Normal     (  ) Rash     (  ) ecchymosis     (  ) varicose veins      (  ) pitting edema     (  ) non-pitting edema   (  ) ulceration     (  ) gangrene:     (location:     )    NERVOUS SYSTEM  (x  ) A&Ox3     (  ) confused     (  ) lethargic  CN II-XII:     (  ) Intact     (  ) focal deficits  (Specify:     )   Upper extremities:     (  ) strength X/5     (  ) focal deficit (specify:    )  Lower extremities:     (  ) strength  X/5    (  ) focal deficit (specify:    )    SKIN  ( x ) No rashes or lesions     (  ) maculopapular rash     (  ) pustules     (  ) vesicles     (  ) ulcer     (  ) ecchymosis     (specify location:     )    [facility]    LABS             11.6   8.54  )-----------( 372      ( 07-31-24 @ 08:58 )             35.9     128  |  90  |  37  -------------------------<  140   07-31-24 @ 08:58  4.0  |  22  |  3.1    Ca      7.5     07-31-24 @ 08:58  Mg     1.9     07-31-24 @ 08:58          Urinalysis Basic - ( 31 Jul 2024 08:58 )    Color: x / Appearance: x / SG: x / pH: x  Gluc: 140 mg/dL / Ketone: x  / Bili: x / Urobili: x   Blood: x / Protein: x / Nitrite: x   Leuk Esterase: x / RBC: x / WBC x   Sq Epi: x / Non Sq Epi: x / Bacteria: x

## 2024-08-01 NOTE — PROGRESS NOTE ADULT - SUBJECTIVE AND OBJECTIVE BOX
seen and examined  24 h events noted   no distress       PAST HISTORY  --------------------------------------------------------------------------------  No significant changes to PMH, PSH, FHx, SHx, unless otherwise noted    ALLERGIES & MEDICATIONS  --------------------------------------------------------------------------------  Allergies    cephalosporins (Angioedema)  Keflex (Swelling)    Intolerances      Standing Inpatient Medications  apixaban 2.5 milliGRAM(s) Oral two times a day  atorvastatin 20 milliGRAM(s) Oral at bedtime  chlorhexidine 2% Cloths 1 Application(s) Topical daily  folic acid 1 milliGRAM(s) Oral daily  furosemide    Tablet 40 milliGRAM(s) Oral two times a day  lidocaine   4% Patch 1 Patch Transdermal daily  metoprolol succinate  milliGRAM(s) Oral daily  pantoprazole    Tablet 40 milliGRAM(s) Oral before breakfast  predniSONE   Tablet 40 milliGRAM(s) Oral daily  sacubitril 49 mG/valsartan 51 mG 1 Tablet(s) Oral two times a day  sertraline 25 milliGRAM(s) Oral daily  timolol 0.5% Solution 1 Drop(s) Both EYES two times a day  traMADol 25 milliGRAM(s) Oral two times a day    PRN Inpatient Medications  acetaminophen     Tablet .. 650 milliGRAM(s) Oral every 6 hours PRN        VITALS/PHYSICAL EXAM  --------------------------------------------------------------------------------  T(C): 36.7 (08-01-24 @ 06:22), Max: 36.7 (08-01-24 @ 06:22)  HR: 66 (08-01-24 @ 06:22) (60 - 82)  BP: 130/60 (08-01-24 @ 06:22) (125/69 - 132/67)  RR: 18 (08-01-24 @ 06:22) (18 - 18)  SpO2: 100% (07-31-24 @ 19:08) (100% - 100%)  Wt(kg): --        Physical Exam:  	Gen: NAD  	Pulm: decrease BS B/L  	CV: S1S2; no rub  	Abd:  soft, /nondistended  	LE:  no edema  	Vascular access:tdc    LABS/STUDIES  --------------------------------------------------------------------------------              11.6   8.54  >-----------<  372      [07-31-24 @ 08:58]              35.9     128  |  90  |  37  ----------------------------<  140      [07-31-24 @ 08:58]  4.0   |  22  |  3.1        Ca     7.5     [07-31-24 @ 08:58]      Mg     1.9     [07-31-24 @ 08:58]        Creatinine Trend:  SCr 3.1 [07-31 @ 08:58]  SCr 4.2 [07-30 @ 06:56]  SCr 2.6 [07-28 @ 07:21]  SCr 3.3 [07-27 @ 07:55]  SCr 2.6 [07-26 @ 07:26]    Urinalysis - [07-31-24 @ 08:58]      Color  / Appearance  / SG  / pH       Gluc 140 / Ketone   / Bili  / Urobili        Blood  / Protein  / Leuk Est  / Nitrite       RBC  / WBC  / Hyaline  / Gran  / Sq Epi  / Non Sq Epi  / Bacteria       Iron 33, TIBC 147, %sat 22      [04-04-24 @ 06:44]  Ferritin 785      [04-04-24 @ 06:44]  PTH -- (Ca 8.2)      [04-09-24 @ 05:43]   35  PTH -- (Ca 8.2)      [03-24-24 @ 20:00]   138  Vitamin D (25OH) 11      [04-09-24 @ 05:43]  Lipid: chol 144, , HDL 39, LDL --      [03-22-24 @ 06:14]      Rheumatoid Factor 22      [07-25-24 @ 05:46]

## 2024-08-01 NOTE — PROGRESS NOTE ADULT - REASON FOR ADMISSION
Pneumonia

## 2024-08-01 NOTE — PROGRESS NOTE ADULT - PROVIDER SPECIALTY LIST ADULT
Cardiology
Hospitalist
Internal Medicine
Nephrology
Hospitalist
Hospitalist
Internal Medicine
Nephrology
Nephrology
Hospitalist
Infectious Disease
Internal Medicine
Nephrology
Infectious Disease
Internal Medicine
Nephrology
Infectious Disease

## 2024-09-10 NOTE — H&P ADULT - NSVTERISKREFERASSESS_GEN_ALL_CORE
Patient presents for B12 injection today.     I am an RN. Does the patient have an active anti-cancer treatment plan? (oral, injection, or IV) Not started yet.    Reviewed and verified Advanced Directives: No: Patient declined to create/provide document at this time     Pre-Injection Information: Allergies reviewed as required for injection type. and Pt denies signs and symptoms of infection.    Refer to MAR (medication administration record) for type of injection and medication given.  Needle Size: 25 g. 1\"  Patient tolerated well: Stable and Follow up appointment scheduled    Lawrence Pitts NP is supervising clinician today.   Refer to the Assessment tab to view/cancel completed assessment.

## 2024-09-12 ENCOUNTER — INPATIENT (INPATIENT)
Facility: HOSPITAL | Age: 89
LOS: 12 days | Discharge: HOME | DRG: 871 | End: 2024-09-25
Attending: INTERNAL MEDICINE | Admitting: STUDENT IN AN ORGANIZED HEALTH CARE EDUCATION/TRAINING PROGRAM
Payer: MEDICARE

## 2024-09-12 VITALS
TEMPERATURE: 102 F | HEART RATE: 108 BPM | DIASTOLIC BLOOD PRESSURE: 106 MMHG | RESPIRATION RATE: 18 BRPM | SYSTOLIC BLOOD PRESSURE: 176 MMHG | OXYGEN SATURATION: 95 % | HEIGHT: 65 IN

## 2024-09-12 DIAGNOSIS — Z98.890 OTHER SPECIFIED POSTPROCEDURAL STATES: Chronic | ICD-10-CM

## 2024-09-12 DIAGNOSIS — Z90.49 ACQUIRED ABSENCE OF OTHER SPECIFIED PARTS OF DIGESTIVE TRACT: Chronic | ICD-10-CM

## 2024-09-12 DIAGNOSIS — Z90.89 ACQUIRED ABSENCE OF OTHER ORGANS: Chronic | ICD-10-CM

## 2024-09-12 DIAGNOSIS — Z95.0 PRESENCE OF CARDIAC PACEMAKER: Chronic | ICD-10-CM

## 2024-09-12 DIAGNOSIS — Z90.710 ACQUIRED ABSENCE OF BOTH CERVIX AND UTERUS: Chronic | ICD-10-CM

## 2024-09-12 DIAGNOSIS — A41.9 SEPSIS, UNSPECIFIED ORGANISM: ICD-10-CM

## 2024-09-12 LAB
ALBUMIN SERPL ELPH-MCNC: 3.7 G/DL — SIGNIFICANT CHANGE UP (ref 3.5–5.2)
ALP SERPL-CCNC: 210 U/L — HIGH (ref 30–115)
ALT FLD-CCNC: 29 U/L — SIGNIFICANT CHANGE UP (ref 0–41)
ANION GAP SERPL CALC-SCNC: 11 MMOL/L — SIGNIFICANT CHANGE UP (ref 7–14)
APTT BLD: 40.7 SEC — HIGH (ref 27–39.2)
AST SERPL-CCNC: 106 U/L — HIGH (ref 0–41)
BASOPHILS # BLD AUTO: 0.05 K/UL — SIGNIFICANT CHANGE UP (ref 0–0.2)
BASOPHILS NFR BLD AUTO: 0.5 % — SIGNIFICANT CHANGE UP (ref 0–1)
BILIRUB SERPL-MCNC: 0.4 MG/DL — SIGNIFICANT CHANGE UP (ref 0.2–1.2)
BUN SERPL-MCNC: 21 MG/DL — HIGH (ref 10–20)
CALCIUM SERPL-MCNC: 8.7 MG/DL — SIGNIFICANT CHANGE UP (ref 8.4–10.5)
CHLORIDE SERPL-SCNC: 93 MMOL/L — LOW (ref 98–110)
CO2 SERPL-SCNC: 27 MMOL/L — SIGNIFICANT CHANGE UP (ref 17–32)
CREAT SERPL-MCNC: 3.4 MG/DL — HIGH (ref 0.7–1.5)
EGFR: 12 ML/MIN/1.73M2 — LOW
EOSINOPHIL # BLD AUTO: 0.09 K/UL — SIGNIFICANT CHANGE UP (ref 0–0.7)
EOSINOPHIL NFR BLD AUTO: 0.8 % — SIGNIFICANT CHANGE UP (ref 0–8)
FLUAV AG NPH QL: SIGNIFICANT CHANGE UP
FLUBV AG NPH QL: SIGNIFICANT CHANGE UP
GLUCOSE SERPL-MCNC: 134 MG/DL — HIGH (ref 70–99)
HCT VFR BLD CALC: 39.4 % — SIGNIFICANT CHANGE UP (ref 37–47)
HGB BLD-MCNC: 12.2 G/DL — SIGNIFICANT CHANGE UP (ref 12–16)
IMM GRANULOCYTES NFR BLD AUTO: 0.5 % — HIGH (ref 0.1–0.3)
INR BLD: 1.21 RATIO — SIGNIFICANT CHANGE UP (ref 0.65–1.3)
LACTATE SERPL-SCNC: 1.9 MMOL/L — SIGNIFICANT CHANGE UP (ref 0.7–2)
LYMPHOCYTES # BLD AUTO: 1.5 K/UL — SIGNIFICANT CHANGE UP (ref 1.2–3.4)
LYMPHOCYTES # BLD AUTO: 13.6 % — LOW (ref 20.5–51.1)
MCHC RBC-ENTMCNC: 29.6 PG — SIGNIFICANT CHANGE UP (ref 27–31)
MCHC RBC-ENTMCNC: 31 G/DL — LOW (ref 32–37)
MCV RBC AUTO: 95.6 FL — SIGNIFICANT CHANGE UP (ref 81–99)
MONOCYTES # BLD AUTO: 0.57 K/UL — SIGNIFICANT CHANGE UP (ref 0.1–0.6)
MONOCYTES NFR BLD AUTO: 5.2 % — SIGNIFICANT CHANGE UP (ref 1.7–9.3)
NEUTROPHILS # BLD AUTO: 8.74 K/UL — HIGH (ref 1.4–6.5)
NEUTROPHILS NFR BLD AUTO: 79.4 % — HIGH (ref 42.2–75.2)
NRBC # BLD: 0 /100 WBCS — SIGNIFICANT CHANGE UP (ref 0–0)
NT-PROBNP SERPL-SCNC: HIGH PG/ML (ref 0–300)
PLATELET # BLD AUTO: 279 K/UL — SIGNIFICANT CHANGE UP (ref 130–400)
PMV BLD: 9.8 FL — SIGNIFICANT CHANGE UP (ref 7.4–10.4)
POTASSIUM SERPL-MCNC: 4.6 MMOL/L — SIGNIFICANT CHANGE UP (ref 3.5–5)
POTASSIUM SERPL-MCNC: SIGNIFICANT CHANGE UP MMOL/L (ref 3.5–5)
POTASSIUM SERPL-SCNC: 4.6 MMOL/L — SIGNIFICANT CHANGE UP (ref 3.5–5)
POTASSIUM SERPL-SCNC: SIGNIFICANT CHANGE UP MMOL/L (ref 3.5–5)
PROT SERPL-MCNC: 7.2 G/DL — SIGNIFICANT CHANGE UP (ref 6–8)
PROTHROM AB SERPL-ACNC: 13.8 SEC — HIGH (ref 9.95–12.87)
RBC # BLD: 4.12 M/UL — LOW (ref 4.2–5.4)
RBC # FLD: 17 % — HIGH (ref 11.5–14.5)
RSV RNA NPH QL NAA+NON-PROBE: SIGNIFICANT CHANGE UP
SARS-COV-2 RNA SPEC QL NAA+PROBE: SIGNIFICANT CHANGE UP
SODIUM SERPL-SCNC: 131 MMOL/L — LOW (ref 135–146)
TROPONIN T, HIGH SENSITIVITY RESULT: 81 NG/L — CRITICAL HIGH (ref 6–13)
TROPONIN T, HIGH SENSITIVITY RESULT: 91 NG/L — CRITICAL HIGH (ref 6–13)
WBC # BLD: 11 K/UL — HIGH (ref 4.8–10.8)
WBC # FLD AUTO: 11 K/UL — HIGH (ref 4.8–10.8)

## 2024-09-12 PROCEDURE — 84100 ASSAY OF PHOSPHORUS: CPT

## 2024-09-12 PROCEDURE — 90935 HEMODIALYSIS ONE EVALUATION: CPT

## 2024-09-12 PROCEDURE — 84132 ASSAY OF SERUM POTASSIUM: CPT

## 2024-09-12 PROCEDURE — 93306 TTE W/DOPPLER COMPLETE: CPT

## 2024-09-12 PROCEDURE — 83735 ASSAY OF MAGNESIUM: CPT

## 2024-09-12 PROCEDURE — 80202 ASSAY OF VANCOMYCIN: CPT

## 2024-09-12 PROCEDURE — 36415 COLL VENOUS BLD VENIPUNCTURE: CPT

## 2024-09-12 PROCEDURE — 82962 GLUCOSE BLOOD TEST: CPT

## 2024-09-12 PROCEDURE — 84484 ASSAY OF TROPONIN QUANT: CPT

## 2024-09-12 PROCEDURE — 85025 COMPLETE CBC W/AUTO DIFF WBC: CPT

## 2024-09-12 PROCEDURE — 80048 BASIC METABOLIC PNL TOTAL CA: CPT

## 2024-09-12 PROCEDURE — 84133 ASSAY OF URINE POTASSIUM: CPT

## 2024-09-12 PROCEDURE — 97116 GAIT TRAINING THERAPY: CPT | Mod: GP

## 2024-09-12 PROCEDURE — 97110 THERAPEUTIC EXERCISES: CPT | Mod: GP

## 2024-09-12 PROCEDURE — 86140 C-REACTIVE PROTEIN: CPT

## 2024-09-12 PROCEDURE — 76705 ECHO EXAM OF ABDOMEN: CPT

## 2024-09-12 PROCEDURE — 80053 COMPREHEN METABOLIC PANEL: CPT

## 2024-09-12 PROCEDURE — 87040 BLOOD CULTURE FOR BACTERIA: CPT

## 2024-09-12 PROCEDURE — 73502 X-RAY EXAM HIP UNI 2-3 VIEWS: CPT | Mod: RT

## 2024-09-12 PROCEDURE — 87641 MR-STAPH DNA AMP PROBE: CPT

## 2024-09-12 PROCEDURE — 84300 ASSAY OF URINE SODIUM: CPT

## 2024-09-12 PROCEDURE — 74176 CT ABD & PELVIS W/O CONTRAST: CPT | Mod: 26,MC

## 2024-09-12 PROCEDURE — 87086 URINE CULTURE/COLONY COUNT: CPT

## 2024-09-12 PROCEDURE — 99285 EMERGENCY DEPT VISIT HI MDM: CPT | Mod: FS

## 2024-09-12 PROCEDURE — 83935 ASSAY OF URINE OSMOLALITY: CPT

## 2024-09-12 PROCEDURE — 81001 URINALYSIS AUTO W/SCOPE: CPT

## 2024-09-12 PROCEDURE — 83930 ASSAY OF BLOOD OSMOLALITY: CPT

## 2024-09-12 PROCEDURE — 97163 PT EVAL HIGH COMPLEX 45 MIN: CPT | Mod: GP

## 2024-09-12 PROCEDURE — 0225U NFCT DS DNA&RNA 21 SARSCOV2: CPT

## 2024-09-12 PROCEDURE — 97530 THERAPEUTIC ACTIVITIES: CPT | Mod: GP

## 2024-09-12 PROCEDURE — 87635 SARS-COV-2 COVID-19 AMP PRB: CPT

## 2024-09-12 PROCEDURE — 71045 X-RAY EXAM CHEST 1 VIEW: CPT | Mod: 26

## 2024-09-12 PROCEDURE — 87640 STAPH A DNA AMP PROBE: CPT

## 2024-09-12 RX ORDER — ACETAMINOPHEN 325 MG
975 TABLET ORAL ONCE
Refills: 0 | Status: COMPLETED | OUTPATIENT
Start: 2024-09-12 | End: 2024-09-12

## 2024-09-12 RX ORDER — PIPERACILLIN SODIUM AND TAZOBACTAM SODIUM 12; 1.5 G/60ML; G/60ML
3.38 INJECTION, POWDER, LYOPHILIZED, FOR SOLUTION INTRAVENOUS ONCE
Refills: 0 | Status: DISCONTINUED | OUTPATIENT
Start: 2024-09-12 | End: 2024-09-12

## 2024-09-12 RX ORDER — ATORVASTATIN CALCIUM 10 MG/1
20 TABLET, FILM COATED ORAL AT BEDTIME
Refills: 0 | Status: DISCONTINUED | OUTPATIENT
Start: 2024-09-12 | End: 2024-09-25

## 2024-09-12 RX ORDER — SACUBITRIL AND VALSARTAN 97; 103 MG/1; MG/1
1 TABLET, FILM COATED ORAL
Refills: 0 | Status: DISCONTINUED | OUTPATIENT
Start: 2024-09-12 | End: 2024-09-25

## 2024-09-12 RX ORDER — FOLIC ACID 1 MG/1
1 TABLET ORAL DAILY
Refills: 0 | Status: DISCONTINUED | OUTPATIENT
Start: 2024-09-12 | End: 2024-09-25

## 2024-09-12 RX ORDER — VANCOMYCIN HCL-SODIUM CHLORIDE IV SOLN 1.5 GM/250ML-0.9% 1.5-0.9/25 GM/ML-%
1000 SOLUTION INTRAVENOUS ONCE
Refills: 0 | Status: COMPLETED | OUTPATIENT
Start: 2024-09-12 | End: 2024-09-12

## 2024-09-12 RX ORDER — METOPROLOL TARTRATE 50 MG
100 TABLET ORAL DAILY
Refills: 0 | Status: DISCONTINUED | OUTPATIENT
Start: 2024-09-12 | End: 2024-09-25

## 2024-09-12 RX ORDER — SERTRALINE HYDROCHLORIDE 100 MG/1
25 TABLET, FILM COATED ORAL DAILY
Refills: 0 | Status: DISCONTINUED | OUTPATIENT
Start: 2024-09-12 | End: 2024-09-25

## 2024-09-12 RX ORDER — APIXABAN 5 MG/1
2.5 TABLET, FILM COATED ORAL
Refills: 0 | Status: DISCONTINUED | OUTPATIENT
Start: 2024-09-13 | End: 2024-09-25

## 2024-09-12 RX ORDER — AZTREONAM 75 MG/ML
2000 KIT INHALATION
Refills: 0 | Status: DISCONTINUED | OUTPATIENT
Start: 2024-09-12 | End: 2024-09-17

## 2024-09-12 RX ORDER — TIMOLOL MALEATE 0.5 %
1 DROPS OPHTHALMIC (EYE)
Refills: 0 | Status: DISCONTINUED | OUTPATIENT
Start: 2024-09-12 | End: 2024-09-25

## 2024-09-12 RX ADMIN — Medication 975 MILLIGRAM(S): at 17:05

## 2024-09-12 RX ADMIN — AZTREONAM 100 MILLIGRAM(S): KIT at 18:21

## 2024-09-12 RX ADMIN — VANCOMYCIN HCL-SODIUM CHLORIDE IV SOLN 1.5 GM/250ML-0.9% 250 MILLIGRAM(S): 1.5-0.9/25 SOLUTION at 17:05

## 2024-09-12 NOTE — H&P ADULT - ASSESSMENT
89-year-old female, past medical history hypertension, DVT/PE, heart failure, AICD in place, RA, end-stage renal disease on hemodialysis gets dialysis Tuesday Thursday Saturday presents emergency department from dialysis for acute shortness of breath.  Patient reports received 30 minutes of dialysis and started to feel acutely short of breath.  In ER vital signs significant for tachycardia and fever.  Patient reports breathing feels better at this time.  Patient reports still makes small amount of urine.  Notes that has been burning her over the last several days.. Denies chest pain, abdominal pain, nausea vomiting diarrhea, hematuria, back pain, headache, neck pain.      Assessment and plan:    # Acute Hypoxemic Hypercapnic Respiratory Failure 2/2 CAP vs. HFrEF Exacerbation vs acute PE  # SIRS on admission vs sepsis ( pt endorses LUTS)   -acute sob and chest pain while dialysis, increase oxygen requirement (currently on 3L), euvolemic on examination  - ED vitals: BP: 176/106, , Temp 101.2, RR 18 , labs showed troponin 89>91, BNP>70K, Na 131, WBC 11K, /ALP >200, lactate normal  -CXR: left sided effusions (read pending),   - CTAP:  Bilateral atelectasis and pleural effusions. Cardiomegaly.   - would do CT chest to r/o PE, however already on AC Partially visualized pacing leads, dialysis catheter and left chest wall   pacing device, no intraabdominal pathology, high suspicion of    - Trop trend  - legionella/strep urine ag/MRSA , BCx ,crp, duplex/ ID consult , UA and UCx  - s/p vancomycin and aztreonam, Monitor vitals, count   - less likely PNA since symptoms was acute, no cough, phelgm, UTI is possibility given LUTS, no other concerning symptoms, O/E: no neck rigidity, no back pain, abdominal apin      #ESRD on HD TTS via TDC ( NO AV fistula)   -HD per nephro  - catheter site non-erythematous  -TTS     #HTN  #HFrEF 25-30%  #HO PE/DVT  - H/o NSVT   Echo: (7/23)  1. Normal left ventricular internal cavity size.   2. Severely decreased global left ventricular systolic function.   3. Left ventricular ejection fraction, by visual estimation, is 25 to   30%.  -c/w Entresto   -c/w atorvastatin 20 mg qhs  -c/w metoprolol  mg qd  -c/w Eliquis 2.5 mg bid  -Not in overload, monitor and reassess in morning  -f/u on Na patient 128gene hypervolemic, f/u on Na in the AM post dialysis session      DVT prophylaxis: Eliquis  GI prophylaxis: PPI  Diet: renal  Code status: DNR/DNI )last admission), will get records  Activity: IAT PT  Dispo: acute   89-year-old female, past medical history hypertension, DVT/PE, heart failure, AICD in place, RA, end-stage renal disease on hemodialysis gets dialysis Tuesday Thursday Saturday presents emergency department from dialysis for acute shortness of breath.  Patient reports received 30 minutes of dialysis and started to feel acutely short of breath.  In ER vital signs significant for tachycardia and fever.  Patient reports breathing feels better at this time.  Patient reports still makes small amount of urine.  Notes that has been burning her over the last several days.. Denies chest pain, abdominal pain, nausea vomiting diarrhea, hematuria, back pain, headache, neck pain.      Assessment and plan:    # Acute Hypoxemic Hypercapnic Respiratory Failure 2/2 CAP vs. HFrEF Exacerbation vs acute PE  # SIRS on admission vs sepsis ( pt endorses LUTS)   -acute sob and chest pain while dialysis, increase oxygen requirement (currently on 3L), euvolemic on examination  - ED vitals: BP: 176/106, , Temp 101.2, RR 18 , labs showed troponin 89>91, BNP>70K, Na 131, WBC 11K, /ALP >200, lactate normal  -CXR: left sided effusions (read pending),   - CTAP:  Bilateral atelectasis and pleural effusions. Cardiomegaly.   - would do CT chest to r/o PE, however already on AC Partially visualized pacing leads, dialysis catheter and left chest wall   pacing device, no intraabdominal pathology, high suspicion of    - Trop trend  - legionella/strep urine ag/MRSA , BCx ,crp, duplex/ ID consult , UA and UCx  - s/p vancomycin and aztreonam, Monitor vitals, count   - less likely PNA since symptoms was acute, no cough, phelgm, UTI is possibility given LUTS, no other concerning symptoms, O/E: no neck rigidity, no back pain, abdominal apin      #ESRD on HD TTS via TDC ( NO AV fistula)   -HD per nephro  - catheter site non-erythematous  -TTS     #HTN  #HFrEF 25-30%  #HO PE/DVT  - H/o NSVT   Echo: (7/23)  1. Normal left ventricular internal cavity size.   2. Severely decreased global left ventricular systolic function.   3. Left ventricular ejection fraction, by visual estimation, is 25 to   30%.  -c/w Entresto   -c/w atorvastatin 20 mg qhs  -c/w metoprolol  mg qd  -c/w Eliquis 2.5 mg bid  -Not in overload, monitor and reassess in morning  -f/u on Na patient 128, rubenley hypervolemic, f/u on Na in the AM post dialysis session      DVT prophylaxis: Eliquis  GI prophylaxis: PPI  Diet: renal  Code status: DNR/DNI   Activity: IAT PT  Dispo: acute

## 2024-09-12 NOTE — ED ADULT TRIAGE NOTE - HEIGHT IN FEET
[facility]  GI Pre-Procedure History & Physical      Name: Lucia Bobo MRN#: [id number]   Age: 71 year old YOB: 1946     Date of Procedure: 12/22/2017    Primary care provider: Keturah Chaney      Reason for Procedure:   Screening (V76.51), RUQ pain    Problem List:     Patient Active Problem List   Diagnosis     Type 2 diabetes mellitus with hyperglycemia, with long-term current use of insulin (H) HgbA1c goal <7     Advanced directives, counseling/discussion     Osteoarthritis of both knees     Morbid obesity with BMI of 45.0-49.9, adult (H)     Hypertriglyceridemia, LDL goal <100     Benign essential hypertension, BP goal <140/90     Mild persistent asthma, uncomplicated       Current Outpatient Medications:      No current outpatient prescriptions on file.        Allergies:      Allergies   Allergen Reactions     Amoxicillin Hives and Itching     Lisinopril Cough     Penicillins Other (See Comments)     Has only had reaction to Amoxicillin so does not take any PCN        History:   Medical:  Past Medical History:   Diagnosis Date     Asthma      HTN      Type II or unspecified type diabetes mellitus without mention of complication, not stated as uncontrolled 4/2/04     Surgical:  Past Surgical History:   Procedure Laterality Date     APPENDECTOMY       CHOLECYSTECTOMY, OPEN       COLONOSCOPY  2007     ESOPHAGOSCOPY, GASTROSCOPY, DUODENOSCOPY (EGD), COMBINED N/A 11/20/2015    Procedure: COMBINED ESOPHAGOSCOPY, GASTROSCOPY, DUODENOSCOPY (EGD);  Surgeon: Anamika Boyer MD;  Location: [facility]     Family:  Family History   Problem Relation Age of Onset     CANCER Mother      Hypertension Father      Social:  Social History   Substance Use Topics     Smoking status: Never Smoker     Smokeless tobacco: Never Used     Alcohol use No       Physical Exam:   Vital Signs:  BP (!) 152/109 (BP Location: Right arm)  Pulse 108  Temp 97.9  F (36.6  C) (Oral)  Resp 20  Ht 1.626 m  "(5' 4\")  Wt 117.9 kg (260 lb)  SpO2 95%  BMI 44.63 kg/m2  Airway assessment:  Patient is able to open mouth wide  Patient is able to stick out tongue       Lungs:  No increased work of breathing, good air exchange, clear to auscultation bilaterally, no crackles or wheezing.   Cardiovascular:  Normal- regular rate and rhythm, normal S1 - S2, no S3 or S4, and no murmur noted.  Gastrointestinal:  Abdomen soft, non-tender.  BS normal. No masses, organomegaly.    Assessment:   Appropriately NPO.  No significant changes since H&P.    Plan:   Moderate (conscious) sedation     General and specific risks of the procedure of the procedure including pain, bleeding, and perforation were discussed. Possibility of missing lesions discussed. Prep and recovery were discussed. She asked appropriate questions and provided consent.      Patient's active problems diagnostically and therapeutically optimized for the planned procedure. Risks, benefits, alternatives to sedation and blood explained and consent obtained.  Risks, benefits, alternatives to procedure explained and consent obtained.    I have reviewed the history and physical, lab finding(s), diagnostic data, medications, and the plan for sedation.  I have determined this patient to be an appropriate candidate for the planned sedation/procedure and have reassessed the patient immediately prior to sedation/procedure.    Aurelio Alanis MD    " 5

## 2024-09-12 NOTE — ED PROVIDER NOTE - CLINICAL SUMMARY MEDICAL DECISION MAKING FREE TEXT BOX
89 yr old female history of DVT PE on anticoagulation heart failure AICDend stage renal disease on hemodialysis presents for evaluation of shortness of breath.  This occurred while at dialysis today.  Here patient found to be febrile tachycardic.  No clear etiology of fever identified broad-spectrum antibiotics added on.  Patient not given full fluid bolus due to end-stage renal disease.  Blood pressure stable admitted for further evaluation.

## 2024-09-12 NOTE — H&P ADULT - ATTENDING COMMENTS
89-year-old female, past medical history hypertension, DVT/PE, heart failure, AICD in place, RA, end-stage renal disease on hemodialysis gets dialysis Tuesday Thursday Saturday presents emergency department from dialysis for acute shortness of breath.  Patient reports received 30 minutes of dialysis and started to feel acutely short of breath.  In ER vital signs significant for tachycardia and fever.  Patient reports breathing feels better at this time.  Patient reports still makes small amount of urine.  Notes that has been burning her over the last several days.. Denies chest pain, abdominal pain, nausea vomiting diarrhea, hematuria, back pain, headache, neck pain.      1.  Acute Hypoxemic Hypercapnic Respiratory Failure and sepsis seems multifactorial due to pneumonia and acute UTI  2/2 CAP vs. HFrEF Exacerbation vs acute PE  # SIRS on admission vs sepsis ( pt endorses LUTS)   * acute sob and chest pain while dialysis, increase oxygen requirement (currently on 3L), euvolemic on examination  *  ED vitals: BP: 176/106, , Temp 101.2, RR 18 , labs showed troponin 89>91, BNP>70K, Na 131, WBC 11K, /ALP >200, lactate normal  - Admit to medicine                              - CXR:              - LA: nl   - Started on broad spectrum abs   - Blood cx:               - urine leg:             - urine strep:             - MRSA:           - Urine cx:   pacing device, no intraabdominal pathology, high suspicion of    - Trop trend  - CTAP:  Bilateral atelectasis and pleural effusions. Cardiomegaly.       2. ESRD on HD TTS via TDC ( NO AV fistula)   -HD per nephro  - catheter site non-erythematous  -TTS     3. HTN/ HFrEF 25-30%/HO PE/DVT. H/o NSVT   Echo: (7/23)  1. Normal left ventricular internal cavity size.   2. Severely decreased global left ventricular systolic function.   3. Left ventricular ejection fraction, by visual estimation, is 25 to   30%.  -c/w Entresto   -c/w atorvastatin 20 mg qhs  -c/w metoprolol  mg qd  -c/w Eliquis 2.5 mg bid  -Not in overload, monitor and reassess in morning  -f/u on Na patient 128, [place] hypervolemic, f/u on Na in the AM post dialysis session      DVT prophylaxis: Eliquis  GI prophylaxis: PPI  Diet: renal  Code status: DNR/DNI   Activity: IAT PT  Dispo: acute 89-year-old female, past medical history hypertension, DVT/PE, heart failure, AICD in place, RA, end-stage renal disease on hemodialysis gets dialysis Tuesday Thursday Saturday presents emergency department from dialysis for acute shortness of breath.  Patient reports received 30 minutes of dialysis and started to feel acutely short of breath.  In ER vital signs significant for tachycardia and fever.  Patient reports breathing feels better at this time.  Patient reports still makes small amount of urine.  Notes that has been burning her over the last several days.. Denies chest pain, abdominal pain, nausea vomiting diarrhea, hematuria, back pain, headache, neck pain.      1.  Acute Hypoxemic Respiratory Failure and sepsis seems multifactorial due to acute UTI  and acute pulmonary edema   # SIRS on admission vs sepsis ( pt endorses LUTS)   * acute sob and chest pain while dialysis, increase oxygen requirement (currently on 3L), euvolemic on examination  *  ED vitals: BP: 176/106, , Temp 101.2, RR 18 , labs showed troponin 89>91,  - Admit to medicine                              - CXR: Cardiomegaly              - LA: nl        -RVP : negative   - Started on broad spectrum abs   - Blood cx:  pending             - urine leg:pending             - urine strep: pending            - MRSA:pending           - Urine cx: pending  - pacing device, no intraabdominal pathology, high suspicion of    - Trop trend  - AST elevated   - CTAP:  Bilateral atelectasis and pleural effusions. Cardiomegaly.       2. ESRD on HD TTS via TDC ( NO AV fistula)   -HD per nephro  - catheter site non-erythematous  -TTS     3. HTN/ HFrEF 25-30%/HO PE/DVT. H/o NSVT   Echo: (7/23)  1. Normal left ventricular internal cavity size.   2. Severely decreased global left ventricular systolic function.   3. Left ventricular ejection fraction, by visual estimation, is 25 to   30%.  -c/w Entresto   -c/w atorvastatin 20 mg qhs  -c/w metoprolol  mg qd  -c/w Eliquis 2.5 mg bid  -Not in overload, monitor and reassess in morning  -f/u on Na patient 128, likley hypervolemic, f/u on Na in the AM post dialysis session      DVT prophylaxis: Eliquis  GI prophylaxis: PPI  Diet: renal  Code status: DNR/DNI   Activity: IAT PT  Dispo: acute    pt acute hypoxia might be due to pulmonary edema. lasix *1. Nephro following  sepsis seems due to uti. reporting burning pain.

## 2024-09-12 NOTE — ED PROVIDER NOTE - PROGRESS NOTE DETAILS
JG - 89 yr old female history of DVT PE on anticoagulation heart failure AICD incisional disease on hemodialysis presents for evaluation of shortness of breath.  This occurred while at dialysis today.  Here patient found to be febrile tachycardic.  No clear etiology of fever identified broad-spectrum antibiotics added on.  Patient not given full fluid bolus due to end-stage renal disease.  Blood pressure stable admitted for further evaluation. JG - 89 yr old female history of DVT PE on anticoagulation heart failure AICDend stage renal disease on hemodialysis presents for evaluation of shortness of breath.  This occurred while at dialysis today.  Here patient found to be febrile tachycardic.  No clear etiology of fever identified broad-spectrum antibiotics added on.  Patient not given full fluid bolus due to end-stage renal disease.  Blood pressure stable admitted for further evaluation.

## 2024-09-12 NOTE — ED PROVIDER NOTE - OBJECTIVE STATEMENT
89-year-old female, past medical history hypertension, DVT/PE, heart failure, AICD in place, RA, end-stage renal disease on hemodialysis gets dialysis Tuesday Thursday Saturday presents emergency department from dialysis for acute shortness of breath.  Patient reports received 30 minutes of dialysis and started to feel acutely short of breath.  In ER vital signs significant for tachycardia and fever.  Patient reports breathing feels better at this time.  Patient reports still makes small amount of urine.  Notes that has been burning her over the last several days.. Denies chest pain, abdominal pain, nausea vomiting diarrhea, hematuria, back pain, headache, neck pain.

## 2024-09-12 NOTE — PHARMACOTHERAPY INTERVENTION NOTE - COMMENTS
Spoke with PA. Informed use of zosyn is not recommended if patient had angioedema to Keflex as chances of cross reactivity between Penicillins Spoke with PA. Informed use of zosyn is not recommended if patient had angioedema to Keflex as chances of cross reactivity between Penicillins and first generation cephalosporins is higher. Recommended switching to Azactam 2g q24h for now and placing ID consult.

## 2024-09-12 NOTE — ED ADULT TRIAGE NOTE - CHIEF COMPLAINT QUOTE
PT BIBEMS from dialysis for chest pain. PT received 30 min of dialysis and then c/o of chest pain. Last dialysis was Tuesday. 2 nitro given prior to arrival. Pt not normally on home o2 - pt 85% on RA.

## 2024-09-12 NOTE — ED PROVIDER NOTE - PHYSICAL EXAMINATION
Physical Exam    Vital Signs: I have reviewed the initial vital signs.  Constitutional: appears stated age, no acute distress  Eyes: Conjunctiva pink, Sclera clear.  Cardiovascular: S1 and S2, Tachycardia, regular rhythm, well-perfused extremities, radial pulses equal and 2+, pedal pulses 2+ and equal  Respiratory: unlabored respiratory effort, Decreased breath sounds bilateral bases with cattered rales  Gastrointestinal: soft, non-tender abdomen, no pulsatile mass, normal bowl sounds  Musculoskeletal: supple neck, no lower extremity edema, no midline tenderness  Integumentary: Right thoracic dialysis port without signs of erythema or infection  Neurologic: awake, alert, nvi

## 2024-09-12 NOTE — ED ADULT TRIAGE NOTE - NS ED TRIAGE EKG
----- Message from Cheyanne Kamara MD sent at 4/3/2019  5:10 PM CDT -----  Normal pap
Patient notified.
EKG completed

## 2024-09-12 NOTE — ED ADULT NURSE NOTE - NSFALLUNIVINTERV_ED_ALL_ED
Bed/Stretcher in lowest position, wheels locked, appropriate side rails in place/Call bell, personal items and telephone in reach/Instruct patient to call for assistance before getting out of bed/chair/stretcher/Non-slip footwear applied when patient is off stretcher/[place] to call system/Physically safe environment - no spills, clutter or unnecessary equipment/Purposeful proactive rounding/Room/bathroom lighting operational, light cord in reach

## 2024-09-12 NOTE — H&P ADULT - HISTORY OF PRESENT ILLNESS
89-year-old female, past medical history hypertension, DVT/PE, heart failure, AICD in place, RA, end-stage renal disease on hemodialysis gets dialysis Tuesday Thursday Saturday presents emergency department from dialysis for acute shortness of breath.  Patient reports received 30 minutes of dialysis and started to feel acutely short of breath.  In ER vital signs significant for tachycardia and fever.  Patient reports breathing feels better at this time.  Patient reports still makes small amount of urine.  Notes that has been burning her over the last several days.. Denies chest pain, abdominal pain, nausea vomiting diarrhea, hematuria, back pain, headache, neck pain.    ED vitals: BP: 176/106, , Temp 101.2, RR 18 , labs showed troponin 89>91, BNP>70K, Na 131, WBC 11K, /ALP >200  s.p aztreonam in ED

## 2024-09-12 NOTE — H&P ADULT - NSHPLABSRESULTS_GEN_ALL_CORE
LABS:                          12.2   11.00 )-----------( 279      ( 12 Sep 2024 17:10 )             39.4     09-12    x   |  x   |  x   ----------------------------<  x   4.6   |  x   |  x     Ca    8.7      12 Sep 2024 17:10    TPro  7.2  /  Alb  3.7  /  TBili  0.4  /  DBili  x   /  AST  106<H>  /  ALT  29  /  AlkPhos  210<H>  09-12    LIVER FUNCTIONS - ( 12 Sep 2024 17:10 )  Alb: 3.7 g/dL / Pro: 7.2 g/dL / ALK PHOS: 210 U/L / ALT: 29 U/L / AST: 106 U/L / GGT: x           PT/INR - ( 12 Sep 2024 17:10 )   PT: 13.80 sec;   INR: 1.21 ratio         PTT - ( 12 Sep 2024 17:10 )  PTT:40.7 sec  Urinalysis Basic - ( 12 Sep 2024 17:10 )    Color: x / Appearance: x / SG: x / pH: x  Gluc: 134 mg/dL / Ketone: x  / Bili: x / Urobili: x   Blood: x / Protein: x / Nitrite: x   Leuk Esterase: x / RBC: x / WBC x   Sq Epi: x / Non Sq Epi: x / Bacteria: x

## 2024-09-12 NOTE — H&P ADULT - NSHPPHYSICALEXAM_GEN_ALL_CORE
GENERAL: NAD, lying in bed comfortably  HEAD:  Atraumatic, normocephalic  EYES: EOMI, PERRL  NECK: Supple, trachea midline, no JVD  HEART: Regular rate and rhythm  LUNGS: Unlabored respirations.  decrease basal breath sound, no crackles, wheezing, or rhonchi  ABDOMEN: Soft, nontender, nondistended, +BS  EXTREMITIES: 2+ peripheral pulses bilaterally. No clubbing, cyanosis, or edema  NERVOUS SYSTEM:  A&Ox3, moving all extremities, no focal deficits

## 2024-09-13 LAB
-  STAPHYLOCOCCUS EPIDERMIDIS, METHICILLIN RESISTANT: SIGNIFICANT CHANGE UP
ALBUMIN SERPL ELPH-MCNC: 3.3 G/DL — LOW (ref 3.5–5.2)
ALP SERPL-CCNC: 170 U/L — HIGH (ref 30–115)
ALT FLD-CCNC: 18 U/L — SIGNIFICANT CHANGE UP (ref 0–41)
ANION GAP SERPL CALC-SCNC: 11 MMOL/L — SIGNIFICANT CHANGE UP (ref 7–14)
AST SERPL-CCNC: 36 U/L — SIGNIFICANT CHANGE UP (ref 0–41)
BASOPHILS # BLD AUTO: 0.05 K/UL — SIGNIFICANT CHANGE UP (ref 0–0.2)
BASOPHILS NFR BLD AUTO: 0.6 % — SIGNIFICANT CHANGE UP (ref 0–1)
BILIRUB SERPL-MCNC: 0.6 MG/DL — SIGNIFICANT CHANGE UP (ref 0.2–1.2)
BUN SERPL-MCNC: 31 MG/DL — HIGH (ref 10–20)
CALCIUM SERPL-MCNC: 8.5 MG/DL — SIGNIFICANT CHANGE UP (ref 8.4–10.5)
CHLORIDE SERPL-SCNC: 95 MMOL/L — LOW (ref 98–110)
CO2 SERPL-SCNC: 29 MMOL/L — SIGNIFICANT CHANGE UP (ref 17–32)
CREAT SERPL-MCNC: 4 MG/DL — HIGH (ref 0.7–1.5)
CRP SERPL-MCNC: 76.5 MG/L — HIGH
EGFR: 10 ML/MIN/1.73M2 — LOW
EOSINOPHIL # BLD AUTO: 0.49 K/UL — SIGNIFICANT CHANGE UP (ref 0–0.7)
EOSINOPHIL NFR BLD AUTO: 5.6 % — SIGNIFICANT CHANGE UP (ref 0–8)
GLUCOSE SERPL-MCNC: 85 MG/DL — SIGNIFICANT CHANGE UP (ref 70–99)
GRAM STN FLD: ABNORMAL
HCT VFR BLD CALC: 37.2 % — SIGNIFICANT CHANGE UP (ref 37–47)
HGB BLD-MCNC: 11.3 G/DL — LOW (ref 12–16)
IMM GRANULOCYTES NFR BLD AUTO: 0.5 % — HIGH (ref 0.1–0.3)
LYMPHOCYTES # BLD AUTO: 2.17 K/UL — SIGNIFICANT CHANGE UP (ref 1.2–3.4)
LYMPHOCYTES # BLD AUTO: 24.7 % — SIGNIFICANT CHANGE UP (ref 20.5–51.1)
MAGNESIUM SERPL-MCNC: 2.1 MG/DL — SIGNIFICANT CHANGE UP (ref 1.8–2.4)
MCHC RBC-ENTMCNC: 29.7 PG — SIGNIFICANT CHANGE UP (ref 27–31)
MCHC RBC-ENTMCNC: 30.4 G/DL — LOW (ref 32–37)
MCV RBC AUTO: 97.6 FL — SIGNIFICANT CHANGE UP (ref 81–99)
METHOD TYPE: SIGNIFICANT CHANGE UP
MONOCYTES # BLD AUTO: 0.86 K/UL — HIGH (ref 0.1–0.6)
MONOCYTES NFR BLD AUTO: 9.8 % — HIGH (ref 1.7–9.3)
MRSA PCR RESULT.: NEGATIVE — SIGNIFICANT CHANGE UP
NEUTROPHILS # BLD AUTO: 5.19 K/UL — SIGNIFICANT CHANGE UP (ref 1.4–6.5)
NEUTROPHILS NFR BLD AUTO: 58.8 % — SIGNIFICANT CHANGE UP (ref 42.2–75.2)
NRBC # BLD: 0 /100 WBCS — SIGNIFICANT CHANGE UP (ref 0–0)
PHOSPHATE SERPL-MCNC: 3.2 MG/DL — SIGNIFICANT CHANGE UP (ref 2.1–4.9)
PLATELET # BLD AUTO: 211 K/UL — SIGNIFICANT CHANGE UP (ref 130–400)
PMV BLD: 9.7 FL — SIGNIFICANT CHANGE UP (ref 7.4–10.4)
POTASSIUM SERPL-MCNC: 4.8 MMOL/L — SIGNIFICANT CHANGE UP (ref 3.5–5)
POTASSIUM SERPL-SCNC: 4.8 MMOL/L — SIGNIFICANT CHANGE UP (ref 3.5–5)
PROT SERPL-MCNC: 6 G/DL — SIGNIFICANT CHANGE UP (ref 6–8)
RBC # BLD: 3.81 M/UL — LOW (ref 4.2–5.4)
RBC # FLD: 16.9 % — HIGH (ref 11.5–14.5)
SODIUM SERPL-SCNC: 135 MMOL/L — SIGNIFICANT CHANGE UP (ref 135–146)
SPECIMEN SOURCE: SIGNIFICANT CHANGE UP
TROPONIN T, HIGH SENSITIVITY RESULT: 90 NG/L — CRITICAL HIGH (ref 6–13)
WBC # BLD: 8.8 K/UL — SIGNIFICANT CHANGE UP (ref 4.8–10.8)
WBC # FLD AUTO: 8.8 K/UL — SIGNIFICANT CHANGE UP (ref 4.8–10.8)

## 2024-09-13 PROCEDURE — 99222 1ST HOSP IP/OBS MODERATE 55: CPT

## 2024-09-13 PROCEDURE — 99223 1ST HOSP IP/OBS HIGH 75: CPT

## 2024-09-13 RX ORDER — CHLORHEXIDINE GLUCONATE ORAL RINSE 1.2 MG/ML
1 SOLUTION DENTAL DAILY
Refills: 0 | Status: DISCONTINUED | OUTPATIENT
Start: 2024-09-13 | End: 2024-09-25

## 2024-09-13 RX ORDER — FUROSEMIDE 10 MG/ML
80 INJECTION INTRAVENOUS ONCE
Refills: 0 | Status: COMPLETED | OUTPATIENT
Start: 2024-09-13 | End: 2024-09-13

## 2024-09-13 RX ADMIN — AZTREONAM 100 MILLIGRAM(S): KIT at 17:44

## 2024-09-13 RX ADMIN — ATORVASTATIN CALCIUM 20 MILLIGRAM(S): 10 TABLET, FILM COATED ORAL at 22:30

## 2024-09-13 RX ADMIN — Medication 100 MILLIGRAM(S): at 05:07

## 2024-09-13 RX ADMIN — SERTRALINE HYDROCHLORIDE 25 MILLIGRAM(S): 100 TABLET, FILM COATED ORAL at 11:15

## 2024-09-13 RX ADMIN — APIXABAN 2.5 MILLIGRAM(S): 5 TABLET, FILM COATED ORAL at 05:07

## 2024-09-13 RX ADMIN — FUROSEMIDE 80 MILLIGRAM(S): 10 INJECTION INTRAVENOUS at 11:36

## 2024-09-13 RX ADMIN — SACUBITRIL AND VALSARTAN 1 TABLET(S): 97; 103 TABLET, FILM COATED ORAL at 05:35

## 2024-09-13 RX ADMIN — SACUBITRIL AND VALSARTAN 1 TABLET(S): 97; 103 TABLET, FILM COATED ORAL at 17:45

## 2024-09-13 RX ADMIN — Medication 1 DROP(S): at 17:44

## 2024-09-13 RX ADMIN — CHLORHEXIDINE GLUCONATE ORAL RINSE 1 APPLICATION(S): 1.2 SOLUTION DENTAL at 17:46

## 2024-09-13 RX ADMIN — APIXABAN 2.5 MILLIGRAM(S): 5 TABLET, FILM COATED ORAL at 17:45

## 2024-09-13 RX ADMIN — FOLIC ACID 1 MILLIGRAM(S): 1 TABLET ORAL at 11:16

## 2024-09-13 NOTE — PHYSICAL THERAPY INITIAL EVALUATION ADULT - BALANCE TRAINING, PT EVAL
Inc sitting balance s/d to G/F+ and progress to standing balance as lorene to increase activity by DC.

## 2024-09-13 NOTE — CONSULT NOTE ADULT - ATTENDING COMMENTS
Patient seen and examined.  Chart reviewed.  Noted 88 yo AAF with h/o ESRD on HD (TTS, last HD thursday x 2 hours), HTN, CHF s/p AICD admitted for hypoxia and SOB, deemed 2/2 CHF exacerbation.  Nephrology now consulted for HD needs while in-house.  Presently stable, possible sepsis 2/2 PNA which also provoked weakness and SOB, but stable for now and no acute indication for HD at this time.  Will plan for HD tomorrow and continue on outpatient TTS regimen while in-house.  Nephrology will follow.

## 2024-09-13 NOTE — PATIENT PROFILE ADULT - FALL HARM RISK - HARM RISK INTERVENTIONS
Assistance with ambulation/Assistance OOB with selected safe patient handling equipment/Communicate Risk of Fall with Harm to all staff/Discuss with provider need for PT consult/Monitor gait and stability/Provide patient with walking aids - walker, cane, crutches/Reinforce activity limits and safety measures with patient and family/Review medications for side effects contributing to fall risk/Sit up slowly, dangle for a short time, stand at bedside before walking/Tailored Fall Risk Interventions/Toileting schedule using arm’s reach rule for commode and bathroom/Visual Cue: Yellow wristband and red socks/Bed in lowest position, wheels locked, appropriate side rails in place/Call bell, personal items and telephone in reach/Instruct patient to call for assistance before getting out of bed or chair/Non-slip footwear when patient is out of bed/[place] to call system/Physically safe environment - no spills, clutter or unnecessary equipment/Purposeful Proactive Rounding/Room/bathroom lighting operational, light cord in reach

## 2024-09-13 NOTE — PHARMACOTHERAPY INTERVENTION NOTE - COMMENTS
Patient on vancomycin dosed by level in the setting of HD as empiric treatment per Dr. Faustin. Patient received vancomycin 1000mg (~17.6 mg/kg) IV x 1 last night, and patient is scheduled for first inpatient dialysis tomorrow (9/14). Recommended holding vancomycin for now, and obtaining a pre-HD vancomycin level on 9/14 with AM labs to assist with further dosing.

## 2024-09-13 NOTE — PATIENT PROFILE ADULT - FALL HARM RISK - CONCLUSION
Assessment:   *49yo female admitted for MRSA sepsis and septic shock.  anemia 2/2 gastritis, NATALIA (recovered).  acute type 1 resp failure with mod ARDS s/p intubation (remains intubated, pending weaning trial).  TM encephalopathy.  Pending GRAHAM today.  May need trach/PEG if weaning fails. propofol was infusing at 19.6mL/hr (517Kcal).  currently on hold for GRAHAM.    *pt eval'd by GI 2/2 intermittent emesis. s/p EGD.      *labs reviewed; hypernatremia.  Pt has had multiple hypokalemias being repleted.    *output: 1100mL urine.  Rectal tube 350mL; pt started on imodium for diarrhea.  (+3) Lt/Rt arm/hand/ankle edema.    *based on current labs and needs to aid with diarrhea and intermittent emesis (low fat formula with fiber); rec'd change TF to Promote @ 40mL/hr with 4pkts prosource TF and 3pkts banatrol daily.  New TF Rx at goal rate with banatrol, prosource TF, and propofol will provide ~1587Kcal, 94 protein, 665mL free water, 1400mL total TF volume.    Recommendations:  1) change TF to Promote @ 40mL/hr with 4pkts Prosource TF and 3pkts banatrol daily  2) monitor hydration status; consider additional free water flushes of 20mL q1hr (=400mL additonal free water daily)  3) monitor TF tolerance; keep back of bed > 35 degrees  4) weekly wt checks to track/trend changes        Diet Prescription: Diet, NPO with Tube Feed:   Tube Feeding Modality: Orogastric  Osmolite 1.5 (OSMOLITE1.5)  Total Volume for 24 Hours (mL): 720  Continuous  Starting Tube Feed Rate {mL per Hour}: 30  Until Goal Tube Feed Rate (mL per Hour): 30  Tube Feed Duration (in Hours): 24  Tube Feed Start Time: 11:00 (07-07-20 @ 08:39)      Wt Hx:  121.6# (7/7)  6/28- 125.2# Admission weight 6/25- 116.6#   Height (cm): 165.1 (06-23-20 @ 20:37)  Weight (kg): 54.4 (06-23-20 @ 20:37)  BMI (kg/m2): 20 (06-23-20 @ 20:37)      07-07-20 @ 07:01  -  07-08-20 @ 07:00  --------------------------------------------------------  IN: 887 mL / OUT: 1450 mL / NET: -563 mL        Estimated Needs:   Weight Used for Calculation: 55Kg (wt from 7/7)    Estimated Energy Needs ( 25-30 calories/kg): 9128-7241 calories  Estimated Protein Needs (1.6-1.8 gm/kg): 88-99gm protein  Estimated Fluid Needs (<20 ml/kg):1000ml        Pertinent Labs: 07-08 Na151 mmol/L<H> Glu 80 mg/dL K+ 3.8 mmol/L Cr  0.98 mg/dL BUN 49 mg/dL<H> 07-07 Phos 4.3 mg/dL 07-07 Alb 1.3 g/dL<L>      Skin: john paul score = 12  stage 2 PU on sacrum    Monitoring and Evaluation:   [x] PO intake/Nutr support infusion [ x ] Tolerance to Nutr [ x ] weights [ x ] labs[ x ] follow up per protocol  [ ] other: Fall with Harm Risk

## 2024-09-13 NOTE — CONSULT NOTE ADULT - ASSESSMENT
89-year-old female, past medical history hypertension, DVT/PE, heart failure, AICD in place, RA, end-stage renal disease on hemodialysis gets dialysis Tuesday Thursday Saturday admitted for acute hypoxemic respiratory failure. Nephrology consulted regarding HD     89-year-old female, past medical history hypertension, DVT/PE, heart failure, AICD in place, RA, end-stage renal disease on hemodialysis gets dialysis Tuesday Thursday Saturday admitted for acute hypoxemic respiratory failure. Nephrology consulted regarding HD      # ESRD on HD.   - Has right tunneled catheter. Skin area clean, no signs of erythema  - No need for urgent dialysis. Will do HD starting tomorrow  -last Phos at target, not on binders   - h/h noted / no need for ELIJAH          89-year-old female, past medical history hypertension, DVT/PE, heart failure, AICD in place, RA, end-stage renal disease on hemodialysis gets dialysis Tuesday Thursday Saturday admitted for acute hypoxemic respiratory failure. Nephrology consulted regarding HD      # ESRD on HD.   - Has right tunneled catheter. Skin area clean, no signs of erythema  - No need for urgent dialysis. Will do HD starting tomorrow. Patient aneuric   -last Phos at target, not on binders   - h/h noted / no need for ELIJAH

## 2024-09-13 NOTE — CONSULT NOTE ADULT - ASSESSMENT
ASSESSMENT  89-year-old female, past medical history hypertension, DVT/PE, heart failure, AICD in place, RA, end-stage renal disease on hemodialysis gets dialysis Tuesday Thursday Saturday presents emergency department from dialysis for acute shortness of breath.  Patient reports received 30 minutes of dialysis and started to feel acutely short of breath.  In ER vital signs significant for tachycardia and fever.  Patient reports breathing feels better at this time.  Patient reports still makes small amount of urine.  Notes that has been burning her over the last several days.. Denies chest pain, abdominal pain, nausea vomiting diarrhea, hematuria, back pain, headache, neck pain.    IMPRESSION  #High risk of sepsis Tm 101.2 P>90 Hypertensive in the ER  Reports mild dysuria   Admission WBC 11  BNP > 70k  Pt denies any localizing symptoms   CT AP unrevealing   Influenza/RSV/COVID19 PCR negative   < from: Xray Chest 1 View-PORTABLE IMMEDIATE (09.12.24 @ 18:05) >  1. Pulmonary edema/interstitial opacities.  2. Small right pleural effusion/basilar opacity.  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 which could results in poor clinical outcomes  #Abx allergy: Keflex (Swelling)  #ESRD on HD  Creatinine: 4.0 (09-13-24 @ 07:11)    Height (cm): 165.1 (09-12-24 @ 15:37)  Weight (kg): 56.7 (07-20-24 @ 08:59)    RECOMMENDATIONS  - f/u BCX  - Send UA UCX  - s/p Vanc 1g 9/12 17:05, - Vanc dosing AUC/YANY per clinical pharmacist  (D/C if BCX NG)  - MRSA nares    If any questions, please send a message or call on [employer] Teams  Please continue to update ID with any pertinent new laboratory, radiographic findings, or change in clinical status ASSESSMENT  89-year-old female, past medical history hypertension, DVT/PE, heart failure, AICD in place, RA, end-stage renal disease on hemodialysis gets dialysis Tuesday Thursday Saturday presents emergency department from dialysis for acute shortness of breath.  Patient reports received 30 minutes of dialysis and started to feel acutely short of breath.  In ER vital signs significant for tachycardia and fever.  Patient reports breathing feels better at this time.  Patient reports still makes small amount of urine.  Notes that has been burning her over the last several days.. Denies chest pain, abdominal pain, nausea vomiting diarrhea, hematuria, back pain, headache, neck pain.    IMPRESSION  #High risk of sepsis Tm 101.2 P>90 Hypertensive in the ER  Reports mild dysuria   Admission WBC 11  BNP > 70k  Pt denies any localizing symptoms   CT AP unrevealing   Influenza/RSV/COVID19 PCR negative   < from: Xray Chest 1 View-PORTABLE IMMEDIATE (09.12.24 @ 18:05) >  1. Pulmonary edema/interstitial opacities.  2. Small right pleural effusion/basilar opacity.  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

## 2024-09-13 NOTE — CONSULT NOTE ADULT - NS ATTEST RISK PROBLEM GEN_ALL_CORE FT
- I independently interpreted the most CXR- L effusion, no PNA   - I discussed my recommendations with the primary team housestaff / Attending   - This patient is on drug therapy requiring intensive monitoring for toxicity. Vancomycin/ requires intensive drug monitoring due to concerns for possible adverse effects, including acute kidney injury, and will be monitored with basic metabolic panel, , Vancomycin levels, to be done while on therapy

## 2024-09-13 NOTE — PROGRESS NOTE ADULT - SUBJECTIVE AND OBJECTIVE BOX
Patient is an 89 year old female who presented for SOB during HD (T/T/Sat schedule) and was found to be tachycardic and febrile in the ED with elevated BNP (10865), CXR showing bilateral pleural effusions.    Overnight/ROS: no acute events overnight, ROS is negative for all systems.     Vital Signs Last 12 Hrs  T(F): 97.6 (09-13-24 @ 05:13), Max: 98.9 (09-12-24 @ 23:50)  HR: 67 (09-13-24 @ 05:13) (67 - 73)  BP: 144/73 (09-13-24 @ 05:13) (144/73 - 149/86)  BP(mean): 97 (09-13-24 @ 05:13) (97 - 97)  RR: 18 (09-13-24 @ 05:13) (18 - 18)  SpO2: 100% (09-12-24 @ 23:50) (100% - 100%)  Wt(kg): --      LABS:                          11.3   8.80  )-----------( 211      ( 13 Sep 2024 07:11 )             37.2     09-13    135  |  95<L>  |  31<H>  ----------------------------<  85  4.8   |  29  |  4.0<H>    Ca    8.5      13 Sep 2024 07:11  Phos  3.2     09-13  Mg     2.1     09-13    TPro  6.0  /  Alb  3.3<L>  /  TBili  0.6  /  DBili  x   /  AST  36  /  ALT  18  /  AlkPhos  170<H>  09-13    PT/INR - ( 12 Sep 2024 17:10 )   PT: 13.80 sec;   INR: 1.21 ratio         PTT - ( 12 Sep 2024 17:10 )  PTT:40.7 sec        Urinalysis Basic - ( 13 Sep 2024 07:11 )    Color: x / Appearance: x / SG: x / pH: x  Gluc: 85 mg/dL / Ketone: x  / Bili: x / Urobili: x   Blood: x / Protein: x / Nitrite: x   Leuk Esterase: x / RBC: x / WBC x   Sq Epi: x / Non Sq Epi: x / Bacteria: x    MEDICATIONS  (STANDING):  apixaban 2.5 milliGRAM(s) Oral two times a day  atorvastatin 20 milliGRAM(s) Oral at bedtime  aztreonam  IVPB 2000 milliGRAM(s) IV Intermittent <User Schedule>  folic acid 1 milliGRAM(s) Oral daily  furosemide   Injectable 80 milliGRAM(s) IV Push once  metoprolol succinate  milliGRAM(s) Oral daily  sacubitril 49 mG/valsartan 51 mG 1 Tablet(s) Oral two times a day  sertraline 25 milliGRAM(s) Oral daily  timolol 0.5% Solution 1 Drop(s) Both EYES two times a day    MEDICATIONS  (PRN):

## 2024-09-13 NOTE — PROGRESS NOTE ADULT - ASSESSMENT
89-year-old female, past medical history hypertension, DVT/PE, heart failure, AICD in place, RA, end-stage renal disease on hemodialysis gets dialysis Tuesday Thursday Saturday presents emergency department from dialysis for acute shortness of breath.  Patient reports received 30 minutes of dialysis and started to feel acutely short of breath.  In ER vital signs significant for tachycardia and fever.  Patient reports breathing feels better at this time.  Patient reports still makes small amount of urine.  Notes that has been burning her over the last several days.. Denies chest pain, abdominal pain, nausea vomiting diarrhea, hematuria, back pain, headache, neck pain.      1.  Acute hypoxemic hypercapnic respiratory failure secondary to most likely HFrEF exacerbation, less likely secondary to sepsis due to UTI or CAP  - SIRS on admission, (, temp 101.2)  - BNP 98422  - patient's WBC downtrending  - patient on aztreonem  - urinalysis is mildly positive  - CXR: bilateral pleural effusions  - CTAP:  Bilateral atelectasis and pleural effusions and cardiomegaly  - echo from July 2024: 30% EF with grade 2 DD  - trop 89 then 91 but patient is ESRD, will not trend  - Na 131, follow BMP  - wean O2  - patient anuric at this time  - strict I&O  - lasix 80mg IV x1 dose, monitor urine output    2. ESRD on HD TTS via TDC ( NO AV fistula)   -HD per nephro  -catheter site non-erythematous  -TTS     3. HTN/ HFrEF   - Left ventricular ejection fraction, by visual estimation, is 25 to 30%  - grade 2 DD  -c/w Entresto   -c/w atorvastatin 20 mg qhs  -c/w metoprolol  mg qd  -c/w Eliquis 2.5 mg bid  -Not in overload at this time      # To follow up  - nephro consult  - wean O2  - urine output s/p lasix  - volume status    # Misc  - DVT Prophylaxis: apixaban  - GI Prophylaxis: - Diet: - Activity: Activity - Ambulate as Tolerated  - Code Status: Full Do Not Resuscitate  - Dispo: acute care

## 2024-09-13 NOTE — PHYSICAL THERAPY INITIAL EVALUATION ADULT - GENERAL OBSERVATIONS, REHAB EVAL
8:21-8:46am Pt encountered supine in bed, A & O x 3 in NAD, no c/o pain and agreeable to PT. Pt requires Max A in bed mobility and dependent sit/stand transfer secondary to impaired balance/weakness. Pt reports she came from [facility] and non-ambulatory x 1 yr. Pt will place on skilled PT 2-3 x/wk for thera ex, functional mobility training and balance activity to improve mobility status.

## 2024-09-14 LAB
ALBUMIN SERPL ELPH-MCNC: 3.2 G/DL — LOW (ref 3.5–5.2)
ALP SERPL-CCNC: 181 U/L — HIGH (ref 30–115)
ALT FLD-CCNC: 19 U/L — SIGNIFICANT CHANGE UP (ref 0–41)
ANION GAP SERPL CALC-SCNC: 15 MMOL/L — HIGH (ref 7–14)
AST SERPL-CCNC: 43 U/L — HIGH (ref 0–41)
BASOPHILS # BLD AUTO: 0.04 K/UL — SIGNIFICANT CHANGE UP (ref 0–0.2)
BASOPHILS NFR BLD AUTO: 0.5 % — SIGNIFICANT CHANGE UP (ref 0–1)
BILIRUB SERPL-MCNC: 0.6 MG/DL — SIGNIFICANT CHANGE UP (ref 0.2–1.2)
BUN SERPL-MCNC: 41 MG/DL — HIGH (ref 10–20)
CALCIUM SERPL-MCNC: 8.9 MG/DL — SIGNIFICANT CHANGE UP (ref 8.4–10.5)
CHLORIDE SERPL-SCNC: 95 MMOL/L — LOW (ref 98–110)
CO2 SERPL-SCNC: 23 MMOL/L — SIGNIFICANT CHANGE UP (ref 17–32)
CREAT SERPL-MCNC: 4.1 MG/DL — CRITICAL HIGH (ref 0.7–1.5)
CULTURE RESULTS: ABNORMAL
EGFR: 10 ML/MIN/1.73M2 — LOW
EOSINOPHIL # BLD AUTO: 0.35 K/UL — SIGNIFICANT CHANGE UP (ref 0–0.7)
EOSINOPHIL NFR BLD AUTO: 4 % — SIGNIFICANT CHANGE UP (ref 0–8)
GLUCOSE SERPL-MCNC: 73 MG/DL — SIGNIFICANT CHANGE UP (ref 70–99)
HCT VFR BLD CALC: 35.7 % — LOW (ref 37–47)
HGB BLD-MCNC: 11.3 G/DL — LOW (ref 12–16)
IMM GRANULOCYTES NFR BLD AUTO: 0.6 % — HIGH (ref 0.1–0.3)
LYMPHOCYTES # BLD AUTO: 2.03 K/UL — SIGNIFICANT CHANGE UP (ref 1.2–3.4)
LYMPHOCYTES # BLD AUTO: 23.1 % — SIGNIFICANT CHANGE UP (ref 20.5–51.1)
MAGNESIUM SERPL-MCNC: 2 MG/DL — SIGNIFICANT CHANGE UP (ref 1.8–2.4)
MCHC RBC-ENTMCNC: 29.9 PG — SIGNIFICANT CHANGE UP (ref 27–31)
MCHC RBC-ENTMCNC: 31.7 G/DL — LOW (ref 32–37)
MCV RBC AUTO: 94.4 FL — SIGNIFICANT CHANGE UP (ref 81–99)
MONOCYTES # BLD AUTO: 0.78 K/UL — HIGH (ref 0.1–0.6)
MONOCYTES NFR BLD AUTO: 8.9 % — SIGNIFICANT CHANGE UP (ref 1.7–9.3)
NEUTROPHILS # BLD AUTO: 5.55 K/UL — SIGNIFICANT CHANGE UP (ref 1.4–6.5)
NEUTROPHILS NFR BLD AUTO: 62.9 % — SIGNIFICANT CHANGE UP (ref 42.2–75.2)
NRBC # BLD: 0 /100 WBCS — SIGNIFICANT CHANGE UP (ref 0–0)
ORGANISM # SPEC MICROSCOPIC CNT: ABNORMAL
ORGANISM # SPEC MICROSCOPIC CNT: SIGNIFICANT CHANGE UP
PLATELET # BLD AUTO: 220 K/UL — SIGNIFICANT CHANGE UP (ref 130–400)
PMV BLD: 9.5 FL — SIGNIFICANT CHANGE UP (ref 7.4–10.4)
POTASSIUM SERPL-MCNC: 5.3 MMOL/L — HIGH (ref 3.5–5)
POTASSIUM SERPL-SCNC: 5.3 MMOL/L — HIGH (ref 3.5–5)
PROT SERPL-MCNC: 6.1 G/DL — SIGNIFICANT CHANGE UP (ref 6–8)
RBC # BLD: 3.78 M/UL — LOW (ref 4.2–5.4)
RBC # FLD: 16.5 % — HIGH (ref 11.5–14.5)
SODIUM SERPL-SCNC: 133 MMOL/L — LOW (ref 135–146)
SPECIMEN SOURCE: SIGNIFICANT CHANGE UP
VANCOMYCIN FLD-MCNC: 11.1 UG/ML — HIGH (ref 5–10)
VANCOMYCIN TROUGH SERPL-MCNC: 11.1 UG/ML — HIGH (ref 5–10)
WBC # BLD: 8.8 K/UL — SIGNIFICANT CHANGE UP (ref 4.8–10.8)
WBC # FLD AUTO: 8.8 K/UL — SIGNIFICANT CHANGE UP (ref 4.8–10.8)

## 2024-09-14 PROCEDURE — 99232 SBSQ HOSP IP/OBS MODERATE 35: CPT

## 2024-09-14 RX ORDER — VANCOMYCIN HCL-SODIUM CHLORIDE IV SOLN 1.5 GM/250ML-0.9% 1.5-0.9/25 GM/ML-%
750 SOLUTION INTRAVENOUS ONCE
Refills: 0 | Status: COMPLETED | OUTPATIENT
Start: 2024-09-14 | End: 2024-09-14

## 2024-09-14 RX ORDER — ACETAMINOPHEN 325 MG
650 TABLET ORAL ONCE
Refills: 0 | Status: COMPLETED | OUTPATIENT
Start: 2024-09-14 | End: 2024-09-14

## 2024-09-14 RX ORDER — VANCOMYCIN HCL-SODIUM CHLORIDE IV SOLN 1.5 GM/250ML-0.9% 1.5-0.9/25 GM/ML-%
1250 SOLUTION INTRAVENOUS ONCE
Refills: 0 | Status: DISCONTINUED | OUTPATIENT
Start: 2024-09-14 | End: 2024-09-14

## 2024-09-14 RX ADMIN — SACUBITRIL AND VALSARTAN 1 TABLET(S): 97; 103 TABLET, FILM COATED ORAL at 06:18

## 2024-09-14 RX ADMIN — Medication 1 DROP(S): at 18:38

## 2024-09-14 RX ADMIN — SACUBITRIL AND VALSARTAN 1 TABLET(S): 97; 103 TABLET, FILM COATED ORAL at 18:38

## 2024-09-14 RX ADMIN — Medication 650 MILLIGRAM(S): at 11:02

## 2024-09-14 RX ADMIN — Medication 650 MILLIGRAM(S): at 10:02

## 2024-09-14 RX ADMIN — APIXABAN 2.5 MILLIGRAM(S): 5 TABLET, FILM COATED ORAL at 18:38

## 2024-09-14 RX ADMIN — Medication 1 DROP(S): at 06:18

## 2024-09-14 RX ADMIN — ATORVASTATIN CALCIUM 20 MILLIGRAM(S): 10 TABLET, FILM COATED ORAL at 21:59

## 2024-09-14 RX ADMIN — APIXABAN 2.5 MILLIGRAM(S): 5 TABLET, FILM COATED ORAL at 06:17

## 2024-09-14 RX ADMIN — AZTREONAM 100 MILLIGRAM(S): KIT at 18:37

## 2024-09-14 RX ADMIN — VANCOMYCIN HCL-SODIUM CHLORIDE IV SOLN 1.5 GM/250ML-0.9% 250 MILLIGRAM(S): 1.5-0.9/25 SOLUTION at 21:27

## 2024-09-14 RX ADMIN — Medication 100 MILLIGRAM(S): at 06:17

## 2024-09-14 NOTE — PHARMACOTHERAPY INTERVENTION NOTE - COMMENTS
Patient is on HD and received a dose of vancomycin 1000mg IV on 9/12 17:05. Vancomycin level on 9/14 8:37 pre-HD was 11.1mg/L. Recommend a one time dose of vancomycin 750mg IV 9/14 18:00 post-HD and obtaining a repeat level 9/17 with AM labs pre-HD.

## 2024-09-14 NOTE — PROGRESS NOTE ADULT - ASSESSMENT
ASSESSMENT  89-year-old female, past medical history hypertension, DVT/PE, heart failure, AICD in place, RA, end-stage renal disease on hemodialysis gets dialysis Tuesday Thursday Saturday presents emergency department from dialysis for acute shortness of breath.  Patient reports received 30 minutes of dialysis and started to feel acutely short of breath.  In ER vital signs significant for tachycardia and fever.  Patient reports breathing feels better at this time.  Patient reports still makes small amount of urine.  Notes that has been burning her over the last several days.. Denies chest pain, abdominal pain, nausea vomiting diarrhea, hematuria, back pain, headache, neck pain.    IMPRESSION:  #High risk of sepsis Tm 101.2 P>90 Hypertensive in the ER  #Possible UTI  Reports mild dysuria   Admission WBC 11  BNP > 70k  Pt denies any localizing symptoms   CT AP unrevealing   Influenza/RSV/COVID19 PCR negative   < from: Xray Chest 1 View-PORTABLE IMMEDIATE (09.12.24 @ 18:05) >  1. Pulmonary edema/interstitial opacities.  2. Small right pleural effusion/basilar opacity.    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  Attending Physician  Division of Infectious Diseases  [facility]  Please contact me via [employer] Teams   ASSESSMENT  89-year-old female, past medical history hypertension, DVT/PE, heart failure, AICD in place, RA, end-stage renal disease on hemodialysis gets dialysis Tuesday Thursday Saturday presents emergency department from dialysis for acute shortness of breath.  Patient reports received 30 minutes of dialysis and started to feel acutely short of breath.  In ER vital signs significant for tachycardia and fever.  Patient reports breathing feels better at this time.  Patient reports still makes small amount of urine.  Notes that has been burning her over the last several days.. Denies chest pain, abdominal pain, nausea vomiting diarrhea, hematuria, back pain, headache, neck pain.    IMPRESSION:  #High risk of sepsis Tm 101.2 P>90 Hypertensive in the ER  #Possible UTI  Reports mild dysuria   Admission WBC 11  BNP > 70k  Pt denies any localizing symptoms   CT AP unrevealing   Influenza/RSV/COVID19 PCR negative   < from: Xray Chest 1 View-PORTABLE IMMEDIATE (09.12.24 @ 18:05) >  1. Pulmonary edema/interstitial opacities.  2. Small right pleural effusion/basilar opacity.    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  Attending Physician  Division of Infectious Diseases  [facility]  Please contact me via [employer] Teams

## 2024-09-14 NOTE — PROGRESS NOTE ADULT - ASSESSMENT
89-year-old female, past medical history hypertension, DVT/PE, heart failure, AICD in place, RA, end-stage renal disease on hemodialysis gets dialysis Tuesday Thursday Saturday admitted for acute hypoxemic respiratory failure. Nephrology consulted regarding HD  # ESRD on HD.   - Has right tunneled catheter. hd today standard bath uf 2 liters as tolerated   - BC / staph epi / repeat ID f/up / patient has TDC   -last Phos at target, not on binders   - h/h noted / no need for ELIJAH   - bp conrolled   will follow

## 2024-09-14 NOTE — PROGRESS NOTE ADULT - SUBJECTIVE AND OBJECTIVE BOX
seen and examined  24 h events noted   no distress        PAST HISTORY  --------------------------------------------------------------------------------  No significant changes to PMH, PSH, FHx, SHx, unless otherwise noted    ALLERGIES & MEDICATIONS  --------------------------------------------------------------------------------  Allergies    Keflex (Swelling)  cephalosporins (Angioedema)    Intolerances      Standing Inpatient Medications  apixaban 2.5 milliGRAM(s) Oral two times a day  atorvastatin 20 milliGRAM(s) Oral at bedtime  aztreonam  IVPB 2000 milliGRAM(s) IV Intermittent <User Schedule>  chlorhexidine 2% Cloths 1 Application(s) Topical daily  folic acid 1 milliGRAM(s) Oral daily  metoprolol succinate  milliGRAM(s) Oral daily  sacubitril 49 mG/valsartan 51 mG 1 Tablet(s) Oral two times a day  sertraline 25 milliGRAM(s) Oral daily  timolol 0.5% Solution 1 Drop(s) Both EYES two times a day          VITALS/PHYSICAL EXAM  --------------------------------------------------------------------------------  T(C): 37.2 (09-14-24 @ 06:44), Max: 37.2 (09-14-24 @ 06:44)  HR: 81 (09-14-24 @ 06:44) (72 - 81)  BP: 116/93 (09-14-24 @ 06:44) (116/93 - 153/78)  RR: 18 (09-14-24 @ 06:44) (18 - 18)  SpO2: 96% (09-13-24 @ 14:00) (92% - 96%)  Wt(kg): --  Height (cm): 165.1 (09-12-24 @ 15:37)      Physical Exam:  	Gen: NAD  	Pulm: decrease BS  B/L  	CV:  S1S2; no rub  	Abd:  soft, /nondistended  	Vascular access:tdc     LABS/STUDIES  --------------------------------------------------------------------------------              11.3   8.80  >-----------<  211      [09-13-24 @ 07:11]              37.2     135  |  95  |  31  ----------------------------<  85      [09-13-24 @ 07:11]  4.8   |  29  |  4.0        Ca     8.5     [09-13-24 @ 07:11]      Mg     2.1     [09-13-24 @ 07:11]      Phos  3.2     [09-13-24 @ 07:11]    TPro  6.0  /  Alb  3.3  /  TBili  0.6  /  DBili  x   /  AST  36  /  ALT  18  /  AlkPhos  170  [09-13-24 @ 07:11]    PT/INR: PT 13.80, INR 1.21       [09-12-24 @ 17:10]  PTT: 40.7       [09-12-24 @ 17:10]      Creatinine Trend:  SCr 4.0 [09-13 @ 07:11]  SCr 3.4 [09-12 @ 17:10]    Urinalysis - [09-13-24 @ 07:11]      Color  / Appearance  / SG  / pH       Gluc 85 / Ketone   / Bili  / Urobili        Blood  / Protein  / Leuk Est  / Nitrite       RBC  / WBC  / Hyaline  / Gran  / Sq Epi  / Non Sq Epi  / Bacteria       Iron 33, TIBC 147, %sat 22      [04-04-24 @ 06:44]  Ferritin 785      [04-04-24 @ 06:44]  PTH -- (Ca 8.2)      [04-09-24 @ 05:43]   35  PTH -- (Ca 8.2)      [03-24-24 @ 20:00]   138  Vitamin D (25OH) 11      [04-09-24 @ 05:43]  Lipid: chol 144, , HDL 39, LDL --      [03-22-24 @ 06:14]

## 2024-09-14 NOTE — PROGRESS NOTE ADULT - SUBJECTIVE AND OBJECTIVE BOX
INFECTIOUS DISEASE FOLLOW UP NOTE:    Interval History/ROS: Patient is a 89y old  Female who presents with a chief complaint of chest pain/sob (14 Sep 2024 09:38)    Overnight events: No overnight event. Feels better today.    REVIEW OF SYSTEMS:  CONSTITUTIONAL: No fever or chills  HEAD: No lesion on scalp  EYES: No visual disturbance  ENT: No sore throat  RESPIRATORY: No cough, + mild shortness of breath  CARDIOVASCULAR: No chest pain or palpitations  GASTROINTESTINAL: No abdominal or epigastric pain  GENITOURINARY: No dysuria  NEUROLOGICAL: No headache/dizziness  MUSCULOSKELETAL: No joint pain, erythema, or swelling; no back pain  SKIN: No itching, rashes  All other ROS negative except noted above        Prior hospital charts reviewed [Yes]  Primary team notes reviewed [Yes]  Other consultant notes reviewed [Yes]    Allergies:  Keflex (Swelling)  cephalosporins (Angioedema)      ANTIMICROBIALS:   aztreonam  IVPB 2000 <User Schedule>      OTHER MEDS: MEDICATIONS  (STANDING):  apixaban 2.5 two times a day  atorvastatin 20 at bedtime  metoprolol succinate  daily  sacubitril 49 mG/valsartan 51 mG 1 two times a day  sertraline 25 daily      Vital Signs Last 24 Hrs  T(F): 97.7 (09-14-24 @ 19:44), Max: 101.7 (09-12-24 @ 15:37)    Vital Signs Last 24 Hrs  HR: 87 (09-14-24 @ 19:44) (61 - 87)  BP: 127/70 (09-14-24 @ 19:44) (116/93 - 160/102)  RR: 18 (09-14-24 @ 19:44)  SpO2: 93% (09-14-24 @ 19:44) (93% - 93%)  Wt(kg): --    EXAM:  GENERAL: NAD, lying in bed  HEAD: No head lesions  EYES: Conjunctiva pink and cornea white  EAR, NOSE, MOUTH, THROAT: Normal external ears and nose, no discharges; moist mucous membranes  NECK: Supple, nontender to palpation; no JVD  RESPIRATORY: Bibasilar crackles  CARDIOVASCULAR: S1 S2  GASTROINTESTINAL: Soft, nontender, nondistended; normoactive bowel sounds  GENITOURINARY: No carlos catheter, no CVA tenderness  EXTREMITIES: No clubbing, cyanosis, or petal edema  NERVOUS SYSTEM: Alert and oriented to person, time, place and situation, speech clear. No focal deficits   MUSCULOSKELETAL: No joint erythema, swelling or pain  SKIN: No rashes or lesions, no superficial thrombophlebitis; AICD site is nontender  PSYCH: Normal affect  LINE: Right chest TDC      Labs:                        11.3   8.80  )-----------( 220      ( 14 Sep 2024 08:37 )             35.7     09-14    133<L>  |  95<L>  |  41<H>  ----------------------------<  73  5.3<H>   |  23  |  4.1<HH>    Ca    8.9      14 Sep 2024 08:37  Phos  3.2     09-13  Mg     2.0     09-14    TPro  6.1  /  Alb  3.2<L>  /  TBili  0.6  /  DBili  x   /  AST  43<H>  /  ALT  19  /  AlkPhos  181<H>  09-14      WBC Trend:  WBC Count: 8.80 (09-14-24 @ 08:37)  WBC Count: 8.80 (09-13-24 @ 07:11)  WBC Count: 11.00 (09-12-24 @ 17:10)      Creatine Trend:  Creatinine: 4.1 (09-14)  Creatinine: 4.0 (09-13)  Creatinine: 3.4 (09-12)      Liver Biochemical Testing Trend:  Alanine Aminotransferase (ALT/SGPT): 19 (09-14)  Alanine Aminotransferase (ALT/SGPT): 18 (09-13)  Alanine Aminotransferase (ALT/SGPT): 29 (09-12)  Alanine Aminotransferase (ALT/SGPT): 16 (07-28)  Alanine Aminotransferase (ALT/SGPT): 17 (07-27)  Aspartate Aminotransferase (AST/SGOT): 43 (09-14-24 @ 08:37)  Aspartate Aminotransferase (AST/SGOT): 36 (09-13-24 @ 07:11)  Aspartate Aminotransferase (AST/SGOT): 106 (09-12-24 @ 17:10)  Aspartate Aminotransferase (AST/SGOT): 31 (07-28-24 @ 07:21)  Aspartate Aminotransferase (AST/SGOT): 38 (07-27-24 @ 07:55)  Bilirubin Total: 0.6 (09-14)  Bilirubin Total: 0.6 (09-13)  Bilirubin Total: 0.4 (09-12)  Bilirubin Total: 0.3 (07-28)  Bilirubin Total: 0.3 (07-27)      Trend LDH      Urinalysis Basic - ( 14 Sep 2024 08:37 )    Color: x / Appearance: x / SG: x / pH: x  Gluc: 73 mg/dL / Ketone: x  / Bili: x / Urobili: x   Blood: x / Protein: x / Nitrite: x   Leuk Esterase: x / RBC: x / WBC x   Sq Epi: x / Non Sq Epi: x / Bacteria: x        MICROBIOLOGY:  Vancomycin Level, Trough: 11.1 (09-14 @ 08:37)  Vancomycin Level, Random: 11.1 (09-14 @ 08:37)    MRSA PCR Result.: Negative (09-13-24 @ 20:40)  MRSA PCR Result.: Negative (07-22-24 @ 11:50)      Culture - Blood (collected 12 Sep 2024 17:10)  Source: .Blood Blood-Peripheral  Final Report:    Growth in aerobic bottle: Staphylococcus hominis    Isolation of Coagulase negative Staphylococcus from single blood culture    sets may represent    contamination. Contact the Microbiology Department at [phone] if    susceptibility testing is    clinically indicated.    Direct identification is available within approximately 3-5    hours either by Blood Panel Multiplexed PCR or Direct    MALDI-TOF. Details: [email or web address]  Organism: Blood Culture PCR  Organism: Blood Culture PCR    Sensitivities:      Method Type: PCR      -  Staphylococcus epidermidis, Methicillin resistant: Detec    Culture - Blood (collected 12 Sep 2024 17:10)  Source: .Blood Blood-Peripheral  Preliminary Report:    No growth at 24 hours    Culture - Blood (collected 24 Jul 2024 11:41)  Source: .Blood None  Final Report:    No growth at 5 days    Culture - Blood (collected 20 Jul 2024 11:31)  Source: .Blood Blood  Final Report:    No growth at 5 days    Culture - Blood (collected 20 Jul 2024 11:31)  Source: .Blood Blood  Final Report:    No growth at 5 days    Culture - Urine (collected 17 Apr 2024 16:35)  Source: Clean Catch Clean Catch (Midstream)  Final Report:    >100,000 CFU/ml Escherichia coli  Organism: Escherichia coli  Organism: Escherichia coli    Sensitivities:      Method Type: YANY      -  Amoxicillin/Clavulanic Acid: S <=8/4      -  Ampicillin: R >16 These ampicillin results predict results for amoxicillin      -  Ampicillin/Sulbactam: I 16/8      -  Aztreonam: S <=4      -  Cefazolin: S <=2 For uncomplicated UTI with K. pneumoniae, E. coli, or P. mirablis: YANY <=16 is sensitive and YANY >=32 is resistant. This also predicts results for oral agents cefaclor, cefdinir, cefpodoxime, cefprozil, cefuroxime axetil, cephalexin and locarbef for uncomplicated UTI. Note that some isolates may be susceptible to these agents while testing resistant to cefazolin.      -  Cefepime: S <=2      -  Cefoxitin: S <=8      -  Ceftriaxone: S <=1      -  Cefuroxime: S 8      -  Ciprofloxacin: R >2      -  Ertapenem: S <=0.5      -  Gentamicin: S <=2      -  Imipenem: S <=1      -  Levofloxacin: R >4      -  Meropenem: S <=1      -  Nitrofurantoin: S <=32 Should not be used to treat pyelonephritis      -  Piperacillin/Tazobactam: S <=8      -  Tobramycin: S <=2      -  Trimethoprim/Sulfamethoxazole: S <=0.5/9.5    Culture - Blood (collected 27 Mar 2024 06:19)  Source: .Blood None  Final Report:    No growth at 5 days    Culture - Blood (collected 27 Mar 2024 01:15)  Source: .Blood None  Final Report:    No growth at 5 days    Culture - Urine (collected 22 Mar 2024 21:20)  Source: Clean Catch Clean Catch (Midstream)  Final Report:    <10,000 CFU/mL Normal Urogenital Rand    Culture - Blood (collected 21 Mar 2024 23:01)  Source: .Blood Blood  Final Report:    No growth at 5 days    C-Reactive Protein: 76.5 (09-13)      Troponin T, High Sensitivity Result: 90 (09-13)  Troponin T, High Sensitivity Result: 91 (09-12)  Troponin T, High Sensitivity Result: 81 (09-12)    Lactate, Blood: 1.9 (09-12 @ 17:10)      RADIOLOGY:  imaging below personally reviewed    < from: CT Abdomen and Pelvis No Cont (09.12.24 @ 19:36) >  IMPRESSION:  No CT evidence of acute intra-abdominal pathology      < end of copied text >    < from: Xray Chest 1 View-PORTABLE IMMEDIATE (09.12.24 @ 18:05) >  IMPRESSION:    1. Pulmonary edema/interstitial opacities.  2. Small right pleural effusion/basilar opacity.      < end of copied text >

## 2024-09-15 LAB
ALBUMIN SERPL ELPH-MCNC: 2.9 G/DL — LOW (ref 3.5–5.2)
ALP SERPL-CCNC: 159 U/L — HIGH (ref 30–115)
ALT FLD-CCNC: 15 U/L — SIGNIFICANT CHANGE UP (ref 0–41)
ANION GAP SERPL CALC-SCNC: 11 MMOL/L — SIGNIFICANT CHANGE UP (ref 7–14)
AST SERPL-CCNC: 31 U/L — SIGNIFICANT CHANGE UP (ref 0–41)
BASOPHILS # BLD AUTO: 0.06 K/UL — SIGNIFICANT CHANGE UP (ref 0–0.2)
BASOPHILS NFR BLD AUTO: 0.9 % — SIGNIFICANT CHANGE UP (ref 0–1)
BILIRUB SERPL-MCNC: 0.4 MG/DL — SIGNIFICANT CHANGE UP (ref 0.2–1.2)
BUN SERPL-MCNC: 26 MG/DL — HIGH (ref 10–20)
CALCIUM SERPL-MCNC: 8.5 MG/DL — SIGNIFICANT CHANGE UP (ref 8.4–10.5)
CHLORIDE SERPL-SCNC: 93 MMOL/L — LOW (ref 98–110)
CO2 SERPL-SCNC: 26 MMOL/L — SIGNIFICANT CHANGE UP (ref 17–32)
CREAT SERPL-MCNC: 2.9 MG/DL — HIGH (ref 0.7–1.5)
EGFR: 15 ML/MIN/1.73M2 — LOW
EOSINOPHIL # BLD AUTO: 0.4 K/UL — SIGNIFICANT CHANGE UP (ref 0–0.7)
EOSINOPHIL NFR BLD AUTO: 6 % — SIGNIFICANT CHANGE UP (ref 0–8)
GLUCOSE SERPL-MCNC: 88 MG/DL — SIGNIFICANT CHANGE UP (ref 70–99)
HCT VFR BLD CALC: 33.3 % — LOW (ref 37–47)
HGB BLD-MCNC: 10.5 G/DL — LOW (ref 12–16)
IMM GRANULOCYTES NFR BLD AUTO: 0.3 % — SIGNIFICANT CHANGE UP (ref 0.1–0.3)
LYMPHOCYTES # BLD AUTO: 1.75 K/UL — SIGNIFICANT CHANGE UP (ref 1.2–3.4)
LYMPHOCYTES # BLD AUTO: 26.2 % — SIGNIFICANT CHANGE UP (ref 20.5–51.1)
MAGNESIUM SERPL-MCNC: 1.9 MG/DL — SIGNIFICANT CHANGE UP (ref 1.8–2.4)
MCHC RBC-ENTMCNC: 29.1 PG — SIGNIFICANT CHANGE UP (ref 27–31)
MCHC RBC-ENTMCNC: 31.5 G/DL — LOW (ref 32–37)
MCV RBC AUTO: 92.2 FL — SIGNIFICANT CHANGE UP (ref 81–99)
MONOCYTES # BLD AUTO: 0.83 K/UL — HIGH (ref 0.1–0.6)
MONOCYTES NFR BLD AUTO: 12.4 % — HIGH (ref 1.7–9.3)
NEUTROPHILS # BLD AUTO: 3.63 K/UL — SIGNIFICANT CHANGE UP (ref 1.4–6.5)
NEUTROPHILS NFR BLD AUTO: 54.2 % — SIGNIFICANT CHANGE UP (ref 42.2–75.2)
NRBC # BLD: 0 /100 WBCS — SIGNIFICANT CHANGE UP (ref 0–0)
PLATELET # BLD AUTO: 224 K/UL — SIGNIFICANT CHANGE UP (ref 130–400)
PMV BLD: 9.6 FL — SIGNIFICANT CHANGE UP (ref 7.4–10.4)
POTASSIUM SERPL-MCNC: 3.8 MMOL/L — SIGNIFICANT CHANGE UP (ref 3.5–5)
POTASSIUM SERPL-SCNC: 3.8 MMOL/L — SIGNIFICANT CHANGE UP (ref 3.5–5)
PROT SERPL-MCNC: 5.8 G/DL — LOW (ref 6–8)
RBC # BLD: 3.61 M/UL — LOW (ref 4.2–5.4)
RBC # FLD: 16.3 % — HIGH (ref 11.5–14.5)
SODIUM SERPL-SCNC: 130 MMOL/L — LOW (ref 135–146)
WBC # BLD: 6.69 K/UL — SIGNIFICANT CHANGE UP (ref 4.8–10.8)
WBC # FLD AUTO: 6.69 K/UL — SIGNIFICANT CHANGE UP (ref 4.8–10.8)

## 2024-09-15 PROCEDURE — 99232 SBSQ HOSP IP/OBS MODERATE 35: CPT

## 2024-09-15 PROCEDURE — 76705 ECHO EXAM OF ABDOMEN: CPT | Mod: 26

## 2024-09-15 RX ORDER — VANCOMYCIN HCL-SODIUM CHLORIDE IV SOLN 1.5 GM/250ML-0.9% 1.5-0.9/25 GM/ML-%
1000 SOLUTION INTRAVENOUS
Refills: 0 | Status: DISCONTINUED | OUTPATIENT
Start: 2024-09-15 | End: 2024-09-17

## 2024-09-15 RX ADMIN — APIXABAN 2.5 MILLIGRAM(S): 5 TABLET, FILM COATED ORAL at 05:55

## 2024-09-15 RX ADMIN — SACUBITRIL AND VALSARTAN 1 TABLET(S): 97; 103 TABLET, FILM COATED ORAL at 18:03

## 2024-09-15 RX ADMIN — AZTREONAM 100 MILLIGRAM(S): KIT at 18:03

## 2024-09-15 RX ADMIN — SACUBITRIL AND VALSARTAN 1 TABLET(S): 97; 103 TABLET, FILM COATED ORAL at 05:55

## 2024-09-15 RX ADMIN — APIXABAN 2.5 MILLIGRAM(S): 5 TABLET, FILM COATED ORAL at 18:03

## 2024-09-15 RX ADMIN — Medication 100 MILLIGRAM(S): at 05:54

## 2024-09-15 RX ADMIN — Medication 1 DROP(S): at 05:54

## 2024-09-15 RX ADMIN — Medication 1 DROP(S): at 18:04

## 2024-09-15 RX ADMIN — ATORVASTATIN CALCIUM 20 MILLIGRAM(S): 10 TABLET, FILM COATED ORAL at 21:27

## 2024-09-15 NOTE — PROGRESS NOTE ADULT - SUBJECTIVE AND OBJECTIVE BOX
Nephrology progress note    THIS IS AN INCOMPLETE NOTE . FULL NOTE TO FOLLOW SHORTLY    Patient is seen and examined, events over the last 24 h noted .    Allergies:  Keflex (Swelling)  cephalosporins (Angioedema)    Hospital Medications:   MEDICATIONS  (STANDING):  apixaban 2.5 milliGRAM(s) Oral two times a day  atorvastatin 20 milliGRAM(s) Oral at bedtime  aztreonam  IVPB 2000 milliGRAM(s) IV Intermittent <User Schedule>  chlorhexidine 2% Cloths 1 Application(s) Topical daily  folic acid 1 milliGRAM(s) Oral daily  metoprolol succinate  milliGRAM(s) Oral daily  sacubitril 49 mG/valsartan 51 mG 1 Tablet(s) Oral two times a day  sertraline 25 milliGRAM(s) Oral daily  timolol 0.5% Solution 1 Drop(s) Both EYES two times a day        VITALS:  T(F): 98.4 (09-15-24 @ 04:53), Max: 98.4 (09-15-24 @ 04:53)  HR: 69 (09-15-24 @ 04:53)  BP: 146/75 (09-15-24 @ 04:53)  RR: 18 (09-15-24 @ 04:53)  SpO2: 95% (09-15-24 @ 04:53)  Wt(kg): --    09-14 @ 07:01  -  09-15 @ 07:00  --------------------------------------------------------  IN: 0 mL / OUT: 2000 mL / NET: -2000 mL          PHYSICAL EXAM:  Constitutional: NAD  HEENT: anicteric sclera, oropharynx clear, MMM  Neck: No JVD  Respiratory: CTAB, no wheezes, rales or rhonchi  Cardiovascular: S1, S2, RRR  Gastrointestinal: BS+, soft, NT/ND  Extremities: No cyanosis or clubbing. No peripheral edema  :  No carlos.   Skin: No rashes    LABS:  09-14    133<L>  |  95<L>  |  41<H>  ----------------------------<  73  5.3<H>   |  23  |  4.1<HH>    Ca    8.9      14 Sep 2024 08:37  Mg     2.0     09-14    TPro  6.1  /  Alb  3.2<L>  /  TBili  0.6  /  DBili      /  AST  43<H>  /  ALT  19  /  AlkPhos  181<H>  09-14                          11.3   8.80  )-----------( 220      ( 14 Sep 2024 08:37 )             35.7       Urine Studies:  Urinalysis Basic - ( 14 Sep 2024 08:37 )    Color:  / Appearance:  / SG:  / pH:   Gluc: 73 mg/dL / Ketone:   / Bili:  / Urobili:    Blood:  / Protein:  / Nitrite:    Leuk Esterase:  / RBC:  / WBC    Sq Epi:  / Non Sq Epi:  / Bacteria:           Iron 33, TIBC 147, %sat 22      [04-04-24 @ 06:44]  Ferritin 785      [04-04-24 @ 06:44]  PTH -- (Ca 8.2)      [04-09-24 @ 05:43]   35  PTH -- (Ca 8.2)      [03-24-24 @ 20:00]   138  Vitamin D (25OH) 11      [04-09-24 @ 05:43]  Lipid: chol 144, , HDL 39, LDL --      [03-22-24 @ 06:14]    HBsAb Nonreact      [03-27-24 @ 15:55]  HBsAg Nonreact      [04-01-24 @ 15:52]  HBcAb Nonreact      [04-03-24 @ 16:02]  HCV 0.20, Nonreact      [04-01-24 @ 15:52]    OSIEL: titer 1:160, pattern DFS70      [04-27-22 @ 16:00]  Rheumatoid Factor 22      [07-25-24 @ 05:46]  Free Light Chains: kappa 13.15, lambda 10.99, ratio = 1.20      [12-21 @ 07:39]  Immunofixation Serum:   No Monoclonal Band Identified    Reference Range: None Detected      [12-20-22 @ 10:57]  SPEP Interpretation: Normal Electrophoresis Pattern      [12-20-22 @ 10:57]  Immunofixation Urine: Reference Range: None Detected      [12-25-20 @ 12:35]  UPEP Interpretation: Mild Selective (Glomerular) Proteinuria      [12-25-20 @ 12:35]      RADIOLOGY & ADDITIONAL STUDIES:   Nephrology progress note    Patient is seen and examined, events over the last 24 h noted .  Lying in bed comfortable     Allergies:  Keflex (Swelling)  cephalosporins (Angioedema)    Hospital Medications:   MEDICATIONS  (STANDING):  apixaban 2.5 milliGRAM(s) Oral two times a day  atorvastatin 20 milliGRAM(s) Oral at bedtime  aztreonam  IVPB 2000 milliGRAM(s) IV Intermittent <User Schedule>  folic acid 1 milliGRAM(s) Oral daily  metoprolol succinate  milliGRAM(s) Oral daily  sacubitril 49 mG/valsartan 51 mG 1 Tablet(s) Oral two times a day  sertraline 25 milliGRAM(s) Oral daily  timolol 0.5% Solution 1 Drop(s) Both EYES two times a day        VITALS:  T(F): 98.4 (09-15-24 @ 04:53), Max: 98.4 (09-15-24 @ 04:53)  HR: 69 (09-15-24 @ 04:53)  BP: 146/75 (09-15-24 @ 04:53)  RR: 18 (09-15-24 @ 04:53)  SpO2: 95% (09-15-24 @ 04:53)      09-14 @ 07:01  -  09-15 @ 07:00  --------------------------------------------------------  IN: 0 mL / OUT: 2000 mL / NET: -2000 mL          PHYSICAL EXAM:  Constitutional: NAD  Respiratory: CTAB,   Cardiovascular: S1, S2, RRR  Gastrointestinal: BS+, soft, NT/ND  Extremities: No cyanosis or clubbing. No peripheral edema  :  No carlos.   Skin: No rashes    LABS:  09-14    133<L>  |  95<L>  |  41<H>  ----------------------------<  73  5.3<H>   |  23  |  4.1<HH>    Ca    8.9      14 Sep 2024 08:37  Mg     2.0     09-14    TPro  6.1  /  Alb  3.2<L>  /  TBili  0.6  /  DBili      /  AST  43<H>  /  ALT  19  /  AlkPhos  181<H>  09-14                          11.3   8.80  )-----------( 220      ( 14 Sep 2024 08:37 )             35.7       Urine Studies:  Urinalysis Basic - ( 14 Sep 2024 08:37 )    Color:  / Appearance:  / SG:  / pH:   Gluc: 73 mg/dL / Ketone:   / Bili:  / Urobili:    Blood:  / Protein:  / Nitrite:    Leuk Esterase:  / RBC:  / WBC    Sq Epi:  / Non Sq Epi:  / Bacteria:           Iron 33, TIBC 147, %sat 22      [04-04-24 @ 06:44]  Ferritin 785      [04-04-24 @ 06:44]  PTH -- (Ca 8.2)      [04-09-24 @ 05:43]   35  PTH -- (Ca 8.2)      [03-24-24 @ 20:00]   138  Vitamin D (25OH) 11      [04-09-24 @ 05:43]  Lipid: chol 144, , HDL 39, LDL --      [03-22-24 @ 06:14]    HBsAb Nonreact      [03-27-24 @ 15:55]  HBsAg Nonreact      [04-01-24 @ 15:52]  HBcAb Nonreact      [04-03-24 @ 16:02]  HCV 0.20, Nonreact      [04-01-24 @ 15:52]    OSIEL: titer 1:160, pattern DFS70      [04-27-22 @ 16:00]  Rheumatoid Factor 22      [07-25-24 @ 05:46]  Free Light Chains: kappa 13.15, lambda 10.99, ratio = 1.20      [12-21 @ 07:39]  Immunofixation Serum:   No Monoclonal Band Identified    Reference Range: None Detected      [12-20-22 @ 10:57]  SPEP Interpretation: Normal Electrophoresis Pattern      [12-20-22 @ 10:57]  Immunofixation Urine: Reference Range: None Detected      [12-25-20 @ 12:35]  UPEP Interpretation: Mild Selective (Glomerular) Proteinuria      [12-25-20 @ 12:35]      RADIOLOGY & ADDITIONAL STUDIES:

## 2024-09-15 NOTE — PROGRESS NOTE ADULT - SUBJECTIVE AND OBJECTIVE BOX
SUBJECTIVE/OVERNIGHT EVENTS  Today is hospital day 3d. This morning patient was seen and examined at bedside, resting comfortably in bed. No acute or major events overnight.        MEDICATIONS  STANDING MEDICATIONS  apixaban 2.5 milliGRAM(s) Oral two times a day  atorvastatin 20 milliGRAM(s) Oral at bedtime  aztreonam  IVPB 2000 milliGRAM(s) IV Intermittent <User Schedule>  chlorhexidine 2% Cloths 1 Application(s) Topical daily  folic acid 1 milliGRAM(s) Oral daily  metoprolol succinate  milliGRAM(s) Oral daily  sacubitril 49 mG/valsartan 51 mG 1 Tablet(s) Oral two times a day  sertraline 25 milliGRAM(s) Oral daily  timolol 0.5% Solution 1 Drop(s) Both EYES two times a day    PRN MEDICATIONS    VITALS  T(F): 98.4 (09-15-24 @ 04:53), Max: 98.4 (09-15-24 @ 04:53)  HR: 69 (09-15-24 @ 04:53) (61 - 87)  BP: 146/75 (09-15-24 @ 04:53) (127/70 - 160/102)  RR: 18 (09-15-24 @ 04:53) (18 - 18)  SpO2: 95% (09-15-24 @ 04:53) (93% - 95%)    PHYSICAL EXAM  GENERAL  ( x ) NAD, lying in bed comfortably     (  ) obtunded     (  ) lethargic     (  ) somnolent    HEAD  (  x) Atraumatic     (  ) hematoma     (  ) laceration (specify location:       )     NECK  ( x ) Supple     (  ) neck stiffness     (  ) nuchal rigidity     (  )  no JVD     (  ) JVD present ( -- cm)    HEART  Rate -->  (  ) normal rate    (  ) bradycardic    (  ) tachycardic  Rhythm -->  (  ) regular    (  ) regularly irregular    (  ) irregularly irregular  Murmurs -->  (  ) normal s1/s2    (  ) systolic murmur    (  ) diastolic murmur    (  ) continuous murmur     (  ) S3 present    (  ) S4 present    LUNGS  (  )Unlabored respirations     (  ) tachypnea  (  ) B/L air entry     (  ) decreased breath sounds in:  (location     )    (  ) no adventitious sound     (  ) crackles     (  ) wheezing      (  ) rhonchi      (specify location:       )  (  ) chest wall tenderness (specify location:       )    ABDOMEN  (  ) Soft     (  ) tense   |   (  ) nondistended     (  ) distended   |   (  ) +BS     (  ) hypoactive bowel sounds     (  ) hyperactive bowel sounds  (  ) nontender     (  ) RUQ tenderness     (  ) RLQ tenderness     (  ) LLQ tenderness     (  ) epigastric tenderness     (  ) diffuse tenderness  (  ) Splenomegaly      (  ) Hepatomegaly      (  ) Jaundice     (  ) ecchymosis     EXTREMITIES  (  ) Normal     (  ) Rash     (  ) ecchymosis     (  ) varicose veins      (  ) pitting edema     (  ) non-pitting edema   (  ) ulceration     (  ) gangrene:     (location:     )    NERVOUS SYSTEM  (  ) A&Ox3     (  ) confused     (  ) lethargic  CN II-XII:     (  ) Intact     (  ) focal deficits  (Specify:     )   Upper extremities:     (  ) strength X/5     (  ) focal deficit (specify:    )  Lower extremities:     (  ) strength  X/5    (  ) focal deficit (specify:    )    SKIN  (  ) No rashes or lesions     (  ) maculopapular rash     (  ) pustules     (  ) vesicles     (  ) ulcer     (  ) ecchymosis     (specify location:     )      LABS             10.5   6.69  )-----------( 224      ( 09-15-24 @ 08:38 )             33.3     133  |  95  |  41  -------------------------<  73   09-14-24 @ 08:37  5.3  |  23  |  4.1    Ca      8.9     09-14-24 @ 08:37  Mg     2.0     09-14-24 @ 08:37    TPro  6.1  /  Alb  3.2  /  TBili  0.6  /  DBili  x   /  AST  43  /  ALT  19  /  AlkPhos  181  /  GGT  x     09-14-24 @ 08:37      Troponin T, High Sensitivity Result: 90 ng/L (09-13-24 @ 07:11)  Troponin T, High Sensitivity Result: 91 ng/L (09-12-24 @ 21:12)  Troponin T, High Sensitivity Result: 81 ng/L (09-12-24 @ 17:10)    Urinalysis Basic - ( 14 Sep 2024 08:37 )    Color: x / Appearance: x / SG: x / pH: x  Gluc: 73 mg/dL / Ketone: x  / Bili: x / Urobili: x   Blood: x / Protein: x / Nitrite: x   Leuk Esterase: x / RBC: x / WBC x   Sq Epi: x / Non Sq Epi: x / Bacteria: x          Culture - Blood (collected 12 Sep 2024 17:10)  Source: .Blood Blood-Peripheral  Gram Stain (13 Sep 2024 16:06):    Growth in aerobic bottle: Gram Positive Cocci in Clusters  Final Report (14 Sep 2024 13:12):    Growth in aerobic bottle: Staphylococcus hominis    Isolation of Coagulase negative Staphylococcus from single blood culture    sets may represent    contamination. Contact the Microbiology Department at [phone] if    susceptibility testing is    clinically indicated.    Direct identification is available within approximately 3-5    hours either by Blood Panel Multiplexed PCR or Direct    MALDI-TOF. Details: [email or web address]  Organism: Blood Culture PCR (14 Sep 2024 13:12)  Organism: Blood Culture PCR (14 Sep 2024 13:12)    Culture - Blood (collected 12 Sep 2024 17:10)  Source: .Blood Blood-Peripheral  Preliminary Report (14 Sep 2024 23:10):    No growth at 48 Hours      IMAGING      ASSESSMENT AND PLAN:    89-year-old female, past medical history hypertension, DVT/PE, heart failure, AICD in place, RA, end-stage renal disease on hemodialysis gets dialysis Tuesday Thursday Saturday presents emergency department from dialysis for acute shortness of breath.  Patient reports received 30 minutes of dialysis and started to feel acutely short of breath.  In ER vital signs significant for tachycardia and fever.  Patient reports breathing feels better at this time.  Patient reports still makes small amount of urine.  Notes that has been burning her over the last several days.. Denies chest pain, abdominal pain, nausea vomiting diarrhea, hematuria, back pain, headache, neck pain.      #Acute hypoxemic hypercapnic respiratory failure secondary to most likely HFrEF exacerbation, pulmonary edema   - SIRS on admission, (, temp 101.2)  - BNP [id number]  - patient's WBC downtrending  - patient on aztreonem  - CXR: bilateral pleural effusions  - CTAP:  Bilateral atelectasis and pleural effusions and cardiomegaly  - echo from July 2024: 30% EF with grade 2 DD  - trop 89 then 91 but patient is ESRD, will not trend  - Na 131, follow BMP  - wean O2  - patient anuric at this time  - strict I&O  - lasix 80mg IV x1 dose, monitor urine output    #Sepsis due to UTI  #Staph Epi Bacteremia   #ESRD on HD TTS via TDC ( NO AV fistula)   -Urinalysis is mildly positive, Ucx: pending  -US abdomen :pending  -HD per nephro  -catheter site non-erythematous  -Cont Aztreonam awaiting urine cx  -ID consult      3. HTN/ HFrEF   - Left ventricular ejection fraction, by visual estimation, is 25 to 30%  - grade 2 DD  -c/w Entresto   -c/w atorvastatin 20 mg qhs  -c/w metoprolol  mg qd  -c/w Eliquis 2.5 mg bid  -Not in overload at this time      # To follow up  - nephro consult  - wean O2  - urine output s/p lasix  - volume status    # Misc  - DVT Prophylaxis: apixaban  - GI Prophylaxis: - Diet: - Activity: Activity - Ambulate as Tolerated  - Code Status: Full Do Not Resuscitate  - Dispo: acute care

## 2024-09-15 NOTE — PROGRESS NOTE ADULT - ASSESSMENT
89-year-old female, past medical history hypertension, DVT/PE, heart failure, AICD in place, RA, end-stage renal disease on hemodialysis gets dialysis Tuesday Thursday Saturday presents emergency department from dialysis for acute shortness of breath.  Patient reports received 30 minutes of dialysis and started to feel acutely short of breath.  In ER vital signs significant for tachycardia and fever.  Patient reports breathing feels better at this time.  Patient reports still makes small amount of urine.  Notes that has been burning her over the last several days.. Denies chest pain, abdominal pain, nausea vomiting diarrhea, hematuria, back pain, headache, neck pain.    #Sepsis, POA (Febrile 101.2 and tachycardic)  # Suspected UTI  # Staph hominis bacteremia  Endorsed dysuria on admission  CTAP 09/12 negative for acute pathology  BCx 09/12 1/4 bottle + staph hominis (possible contamination, but pt has ICD and TDC)  MRSA PCR neg  dw ID  - Cont IV aztreonam 2 q24h, and vancomycin 1g post HD  - fu repeat BCx    #Acute Hypoxic Respiratory Failure, resolved  CXR 09/12 showing pulmonary edema/interstitial opacities  Now on RA. Did not endorse dyspnea today  - fluid removal via HD    #ESRD on HD TTS  - HD per nephrology    #HTN  #Chronic HFrEF 25-30%  #HO PE/DVT  #H/o NSVT   Echo: (7/23)  1. Normal left ventricular internal cavity size.   2. Severely decreased global left ventricular systolic function.   3. Left ventricular ejection fraction, by visual estimation, is 25 to   30%.  -c/w Entresto   -c/w atorvastatin 20 mg qhs  -c/w metoprolol  mg qd  -c/w Eliquis 2.5 mg bid    DVT PPX, Eliquis    #Progress Note Handoff  Pending (specify): Repeat BCx, cont IV abx, monitor for fevers  Family discussion: naveed pt regarding plan of care  Disposition: GMF, from home

## 2024-09-15 NOTE — PROGRESS NOTE ADULT - ASSESSMENT
89-year-old female, past medical history hypertension, DVT/PE, heart failure, AICD in place, RA, end-stage renal disease on hemodialysis gets dialysis Tuesday Thursday Saturday admitted for acute hypoxemic respiratory failure. Nephrology consulted regarding HD  # ESRD on HD.   - Has right tunneled catheter. sp HD yesterday / next on tuesday   - BC / staph epi / noted  ID f/up / on aztreonam vanco / follow trough  patient has TDC   -last Phos at target, not on binders   - h/h noted / no need for ELIJAH   - bp conrolled   will follow

## 2024-09-16 LAB
ALBUMIN SERPL ELPH-MCNC: 3.2 G/DL — LOW (ref 3.5–5.2)
ALP SERPL-CCNC: 150 U/L — HIGH (ref 30–115)
ALT FLD-CCNC: 13 U/L — SIGNIFICANT CHANGE UP (ref 0–41)
ANION GAP SERPL CALC-SCNC: 11 MMOL/L — SIGNIFICANT CHANGE UP (ref 7–14)
ANION GAP SERPL CALC-SCNC: 13 MMOL/L — SIGNIFICANT CHANGE UP (ref 7–14)
APPEARANCE UR: CLEAR — SIGNIFICANT CHANGE UP
AST SERPL-CCNC: 26 U/L — SIGNIFICANT CHANGE UP (ref 0–41)
BASOPHILS # BLD AUTO: 0.04 K/UL — SIGNIFICANT CHANGE UP (ref 0–0.2)
BASOPHILS NFR BLD AUTO: 0.8 % — SIGNIFICANT CHANGE UP (ref 0–1)
BILIRUB SERPL-MCNC: 0.4 MG/DL — SIGNIFICANT CHANGE UP (ref 0.2–1.2)
BILIRUB UR-MCNC: NEGATIVE — SIGNIFICANT CHANGE UP
BUN SERPL-MCNC: 33 MG/DL — HIGH (ref 10–20)
BUN SERPL-MCNC: 37 MG/DL — HIGH (ref 10–20)
CALCIUM SERPL-MCNC: 8.1 MG/DL — LOW (ref 8.4–10.5)
CALCIUM SERPL-MCNC: 8.1 MG/DL — LOW (ref 8.4–10.5)
CHLORIDE SERPL-SCNC: 94 MMOL/L — LOW (ref 98–110)
CHLORIDE SERPL-SCNC: 94 MMOL/L — LOW (ref 98–110)
CO2 SERPL-SCNC: 24 MMOL/L — SIGNIFICANT CHANGE UP (ref 17–32)
CO2 SERPL-SCNC: 27 MMOL/L — SIGNIFICANT CHANGE UP (ref 17–32)
COLOR SPEC: YELLOW — SIGNIFICANT CHANGE UP
CREAT SERPL-MCNC: 3.4 MG/DL — HIGH (ref 0.7–1.5)
CREAT SERPL-MCNC: 3.5 MG/DL — HIGH (ref 0.7–1.5)
DIFF PNL FLD: NEGATIVE — SIGNIFICANT CHANGE UP
EGFR: 12 ML/MIN/1.73M2 — LOW
EGFR: 12 ML/MIN/1.73M2 — LOW
EOSINOPHIL # BLD AUTO: 0.62 K/UL — SIGNIFICANT CHANGE UP (ref 0–0.7)
EOSINOPHIL NFR BLD AUTO: 11.6 % — HIGH (ref 0–8)
GLUCOSE BLDC GLUCOMTR-MCNC: 105 MG/DL — HIGH (ref 70–99)
GLUCOSE SERPL-MCNC: 127 MG/DL — HIGH (ref 70–99)
GLUCOSE SERPL-MCNC: 92 MG/DL — SIGNIFICANT CHANGE UP (ref 70–99)
GLUCOSE UR QL: NEGATIVE MG/DL — SIGNIFICANT CHANGE UP
HCT VFR BLD CALC: 36.2 % — LOW (ref 37–47)
HGB BLD-MCNC: 11.3 G/DL — LOW (ref 12–16)
IMM GRANULOCYTES NFR BLD AUTO: 0.4 % — HIGH (ref 0.1–0.3)
KETONES UR-MCNC: NEGATIVE MG/DL — SIGNIFICANT CHANGE UP
LEUKOCYTE ESTERASE UR-ACNC: ABNORMAL
LYMPHOCYTES # BLD AUTO: 1.41 K/UL — SIGNIFICANT CHANGE UP (ref 1.2–3.4)
LYMPHOCYTES # BLD AUTO: 26.5 % — SIGNIFICANT CHANGE UP (ref 20.5–51.1)
MAGNESIUM SERPL-MCNC: 2 MG/DL — SIGNIFICANT CHANGE UP (ref 1.8–2.4)
MCHC RBC-ENTMCNC: 29.5 PG — SIGNIFICANT CHANGE UP (ref 27–31)
MCHC RBC-ENTMCNC: 31.2 G/DL — LOW (ref 32–37)
MCV RBC AUTO: 94.5 FL — SIGNIFICANT CHANGE UP (ref 81–99)
MONOCYTES # BLD AUTO: 0.59 K/UL — SIGNIFICANT CHANGE UP (ref 0.1–0.6)
MONOCYTES NFR BLD AUTO: 11.1 % — HIGH (ref 1.7–9.3)
NEUTROPHILS # BLD AUTO: 2.65 K/UL — SIGNIFICANT CHANGE UP (ref 1.4–6.5)
NEUTROPHILS NFR BLD AUTO: 49.6 % — SIGNIFICANT CHANGE UP (ref 42.2–75.2)
NITRITE UR-MCNC: NEGATIVE — SIGNIFICANT CHANGE UP
NRBC # BLD: 0 /100 WBCS — SIGNIFICANT CHANGE UP (ref 0–0)
OSMOLALITY SERPL: 281 MOS/KG — SIGNIFICANT CHANGE UP (ref 280–301)
OSMOLALITY UR: 210 MOS/KG — SIGNIFICANT CHANGE UP (ref 50–1200)
PH UR: 7.5 — SIGNIFICANT CHANGE UP (ref 5–8)
PLATELET # BLD AUTO: 233 K/UL — SIGNIFICANT CHANGE UP (ref 130–400)
PMV BLD: 9.7 FL — SIGNIFICANT CHANGE UP (ref 7.4–10.4)
POTASSIUM SERPL-MCNC: 3.4 MMOL/L — LOW (ref 3.5–5)
POTASSIUM SERPL-MCNC: 3.8 MMOL/L — SIGNIFICANT CHANGE UP (ref 3.5–5)
POTASSIUM SERPL-SCNC: 3.4 MMOL/L — LOW (ref 3.5–5)
POTASSIUM SERPL-SCNC: 3.8 MMOL/L — SIGNIFICANT CHANGE UP (ref 3.5–5)
POTASSIUM UR-SCNC: 22 MMOL/L — SIGNIFICANT CHANGE UP
PROT SERPL-MCNC: 5.8 G/DL — LOW (ref 6–8)
PROT UR-MCNC: 300 MG/DL
RBC # BLD: 3.83 M/UL — LOW (ref 4.2–5.4)
RBC # FLD: 16 % — HIGH (ref 11.5–14.5)
SODIUM SERPL-SCNC: 131 MMOL/L — LOW (ref 135–146)
SODIUM SERPL-SCNC: 132 MMOL/L — LOW (ref 135–146)
SODIUM UR-SCNC: 39 MMOL/L — SIGNIFICANT CHANGE UP
SP GR SPEC: 1.01 — SIGNIFICANT CHANGE UP (ref 1–1.03)
UROBILINOGEN FLD QL: 1 MG/DL — SIGNIFICANT CHANGE UP (ref 0.2–1)
WBC # BLD: 5.33 K/UL — SIGNIFICANT CHANGE UP (ref 4.8–10.8)
WBC # FLD AUTO: 5.33 K/UL — SIGNIFICANT CHANGE UP (ref 4.8–10.8)

## 2024-09-16 PROCEDURE — 99232 SBSQ HOSP IP/OBS MODERATE 35: CPT

## 2024-09-16 RX ADMIN — FOLIC ACID 1 MILLIGRAM(S): 1 TABLET ORAL at 13:07

## 2024-09-16 RX ADMIN — Medication 100 MILLIGRAM(S): at 05:57

## 2024-09-16 RX ADMIN — APIXABAN 2.5 MILLIGRAM(S): 5 TABLET, FILM COATED ORAL at 17:46

## 2024-09-16 RX ADMIN — AZTREONAM 100 MILLIGRAM(S): KIT at 17:45

## 2024-09-16 RX ADMIN — ATORVASTATIN CALCIUM 20 MILLIGRAM(S): 10 TABLET, FILM COATED ORAL at 23:04

## 2024-09-16 RX ADMIN — CHLORHEXIDINE GLUCONATE ORAL RINSE 1 APPLICATION(S): 1.2 SOLUTION DENTAL at 13:06

## 2024-09-16 RX ADMIN — SERTRALINE HYDROCHLORIDE 25 MILLIGRAM(S): 100 TABLET, FILM COATED ORAL at 13:07

## 2024-09-16 RX ADMIN — SACUBITRIL AND VALSARTAN 1 TABLET(S): 97; 103 TABLET, FILM COATED ORAL at 05:57

## 2024-09-16 RX ADMIN — Medication 1 DROP(S): at 17:46

## 2024-09-16 RX ADMIN — SACUBITRIL AND VALSARTAN 1 TABLET(S): 97; 103 TABLET, FILM COATED ORAL at 17:46

## 2024-09-16 RX ADMIN — APIXABAN 2.5 MILLIGRAM(S): 5 TABLET, FILM COATED ORAL at 05:57

## 2024-09-16 RX ADMIN — Medication 1 DROP(S): at 05:58

## 2024-09-16 NOTE — PROGRESS NOTE ADULT - ASSESSMENT
89-year-old female, past medical history hypertension, DVT/PE, heart failure, AICD in place, RA, end-stage renal disease on hemodialysis gets dialysis Tuesday Thursday Saturday admitted for acute hypoxemic respiratory failure. Nephrology consulted regarding HD  # ESRD on HD.   - Has right tunneled catheter. hd in am   - BC / staph epi / noted  ID f/up / on aztreonam vanco / follow trough  patient has TDC / repeat BC / follow vanco level   -last Phos at target, not on binders   - h/h noted / no need for ELIJAH   - bp conrolled   will follow

## 2024-09-16 NOTE — PROGRESS NOTE ADULT - ATTENDING COMMENTS
89-year-old female, past medical history hypertension, DVT/PE, heart failure, AICD in place, RA, end-stage renal disease on hemodialysis gets dialysis Tuesday Thursday Saturday presents emergency department from dialysis for acute shortness of breath.  Patient reports received 30 minutes of dialysis and started to feel acutely short of breath.  In ER vital signs significant for tachycardia and fever.  Patient reports breathing feels better at this time.  Patient reports still makes small amount of urine.  Notes that has been burning her over the last several days.. Denies chest pain, abdominal pain, nausea vomiting diarrhea, hematuria, back pain, headache, neck pain.    #Sepsis, POA (Febrile 101.2 and tachycardic)  # Suspected UTI  # Staph hominis bacteremia  Endorsed dysuria on admission  CTAP 09/12 negative for acute pathology  BCx 09/12 1/4 bottle + staph hominis (possible contamination, but pt has ICD and TDC)  MRSA PCR neg  dw ID  - Cont IV aztreonam 2 q24h, and vancomycin 1g post HD  - fu repeat BCx (sent 09/15)  - fu UCx    #Acute Hypoxic Respiratory Failure, resolved  CXR 09/12 showing pulmonary edema/interstitial opacities  Now on RA. Did not endorse dyspnea today  - fluid removal via HD    #ESRD on HD TTS  - HD per nephrology    #HTN  #Chronic HFrEF 25-30%  #HO PE/DVT  #H/o NSVT   Echo: (7/23)  1. Normal left ventricular internal cavity size.   2. Severely decreased global left ventricular systolic function.   3. Left ventricular ejection fraction, by visual estimation, is 25 to   30%.  -c/w Entresto   -c/w atorvastatin 20 mg qhs  -c/w metoprolol  mg qd  -c/w Eliquis 2.5 mg bid    DVT PPX, Eliquis    #Progress Note Handoff  Pending (specify): Repeat BCx, UCx, cont IV abx, monitor for fevers  Family discussion: naveed pt regarding plan of care  Disposition: GMF, from home

## 2024-09-16 NOTE — PROGRESS NOTE ADULT - SUBJECTIVE AND OBJECTIVE BOX
RACHEL SUMMERS  89y, Female  Allergy: Keflex (Swelling)  cephalosporins (Angioedema)      LOS  4d    CHIEF COMPLAINT: chest pain/sob (15 Sep 2024 09:44)      INTERVAL EVENTS/HPI  - No acute events overnight  - T(F): , Max: 98 (09-15-24 @ 19:35)  - Tolerating medication  - WBC Count: 6.69 (09-15-24 @ 08:38)  WBC Count: 8.80 (09-14-24 @ 08:37)     - Creatinine: 3.4 (09-15-24 @ 20:00)  Creatinine: 2.9 (09-15-24 @ 08:38)       ROS  ***    VITALS:  T(F): 97.7, Max: 98 (09-15-24 @ 19:35)  HR: 61  BP: 149/75  RR: 18Vital Signs Last 24 Hrs  T(C): 36.5 (16 Sep 2024 05:20), Max: 36.7 (15 Sep 2024 19:35)  T(F): 97.7 (16 Sep 2024 05:20), Max: 98 (15 Sep 2024 19:35)  HR: 61 (16 Sep 2024 05:20) (61 - 86)  BP: 149/75 (16 Sep 2024 05:20) (144/72 - 149/75)  BP(mean): --  RR: 18 (16 Sep 2024 05:20) (18 - 18)  SpO2: 96% (16 Sep 2024 05:20) (95% - 96%)        PHYSICAL EXAM:  ***    FH: Non-contributory  Social Hx: Non-contributory    TESTS & MEASUREMENTS:                        10.5   6.69  )-----------( 224      ( 15 Sep 2024 08:38 )             33.3     09-15    131<L>  |  94<L>  |  33<H>  ----------------------------<  127<H>  3.8   |  24  |  3.4<H>    Ca    8.1<L>      15 Sep 2024 20:00  Mg     1.9     09-15    TPro  5.8<L>  /  Alb  2.9<L>  /  TBili  0.4  /  DBili  x   /  AST  31  /  ALT  15  /  AlkPhos  159<H>  09-15      LIVER FUNCTIONS - ( 15 Sep 2024 08:38 )  Alb: 2.9 g/dL / Pro: 5.8 g/dL / ALK PHOS: 159 U/L / ALT: 15 U/L / AST: 31 U/L / GGT: x           Urinalysis Basic - ( 15 Sep 2024 20:00 )    Color: x / Appearance: x / SG: x / pH: x  Gluc: 127 mg/dL / Ketone: x  / Bili: x / Urobili: x   Blood: x / Protein: x / Nitrite: x   Leuk Esterase: x / RBC: x / WBC x   Sq Epi: x / Non Sq Epi: x / Bacteria: x        Culture - Blood (collected 09-12-24 @ 17:10)  Source: .Blood Blood-Peripheral  Gram Stain (09-13-24 @ 16:06):    Growth in aerobic bottle: Gram Positive Cocci in Clusters  Final Report (09-14-24 @ 13:12):    Growth in aerobic bottle: Staphylococcus hominis    Isolation of Coagulase negative Staphylococcus from single blood culture    sets may represent    contamination. Contact the Microbiology Department at [phone] if    susceptibility testing is    clinically indicated.    Direct identification is available within approximately 3-5    hours either by Blood Panel Multiplexed PCR or Direct    MALDI-TOF. Details: [email or web address]  Organism: Blood Culture PCR (09-14-24 @ 13:12)  Organism: Blood Culture PCR (09-14-24 @ 13:12)      Method Type: PCR      -  Staphylococcus epidermidis, Methicillin resistant: Detec    Culture - Blood (collected 09-12-24 @ 17:10)  Source: .Blood Blood-Peripheral  Preliminary Report (09-15-24 @ 23:01):    No growth at 72 Hours        Lactate, Blood: 1.9 mmol/L (09-12-24 @ 17:10)      INFECTIOUS DISEASES TESTING  Vancomycin Level, Trough: 11.1 (09-14-24 @ 08:37)  Vancomycin Level, Random: 11.1 (09-14-24 @ 08:37)  MRSA PCR Result.: Negative (09-13-24 @ 20:40)  Rapid RVP Result: Detected (07-23-24 @ 23:38)  MRSA PCR Result.: Negative (07-22-24 @ 11:50)  Streptococcus pneumoniae Ag, Ur Result: Negative (07-22-24 @ 11:36)  Legionella Antigen, Urine: Negative (07-22-24 @ 11:36)  Procalcitonin: 0.23 (07-20-24 @ 14:32)  Rapid RVP Result: NotDetec (04-08-24 @ 00:15)  Procalcitonin, Serum: 0.70 (03-30-24 @ 09:03)  Rapid RVP Result: NotDetec (03-27-24 @ 12:02)  Procalcitonin, Serum: 0.13 (03-23-24 @ 16:00)  strept    INFLAMMATORY MARKERS  C-Reactive Protein: 76.5 mg/L (09-13-24 @ 07:11)  C-Reactive Protein: 70.5 mg/L (07-25-24 @ 09:20)  Sedimentation Rate, Erythrocyte: 105 mm/Hr (07-25-24 @ 09:20)      RADIOLOGY & ADDITIONAL TESTS:  I have personally reviewed the last available Chest xray  CXR      CT      CARDIOLOGY TESTING  12 Lead ECG:   Ventricular Rate 97 BPM    Atrial Rate 97 BPM    P-R Interval 148 ms    QRS Duration 128 ms    Q-T Interval 384 ms    QTC Calculation(Bazett) 487 ms    P Axis 52 degrees    R Axis -19 degrees    T Axis 137 degrees    Diagnosis Line Sinus rhythm with occasional Premature ventricular complexes and Premature  atrial complexes  Left ventricular hypertrophy with QRS widening and repolarization abnormality   ( [place] product , Romhilt-Sparks )  Abnormal ECG    Confirmed by Abdulkadir Chopra ([id number]) on 9/12/2024 5:53:10 PM (09-12-24 @ 15:41)      MEDICATIONS  apixaban 2.5 Oral two times a day  atorvastatin 20 Oral at bedtime  aztreonam  IVPB 2000 IV Intermittent <User Schedule>  chlorhexidine 2% Cloths 1 Topical daily  folic acid 1 Oral daily  metoprolol succinate  Oral daily  sacubitril 49 mG/valsartan 51 mG 1 Oral two times a day  sertraline 25 Oral daily  timolol 0.5% Solution 1 Both EYES two times a day  vancomycin  IVPB 1000 IV Intermittent <User Schedule>      WEIGHT  Weight (kg): 56.7 (07-20-24 @ 08:59)  Creatinine: 3.4 mg/dL (09-15-24 @ 20:00)  Creatinine: 2.9 mg/dL (09-15-24 @ 08:38)      ANTIBIOTICS:  aztreonam  IVPB 2000 milliGRAM(s) IV Intermittent <User Schedule>  vancomycin  IVPB 1000 milliGRAM(s) IV Intermittent <User Schedule>      All available historical records have been reviewed       RACHEL SUMMERS  89y, Female  Allergy: Keflex (Swelling)  cephalosporins (Angioedema)      LOS  4d    CHIEF COMPLAINT: chest pain/sob (15 Sep 2024 09:44)      INTERVAL EVENTS/HPI  - No acute events overnight  - T(F): , Max: 98 (09-15-24 @ 19:35)  - Tolerating medication  - WBC Count: 6.69 (09-15-24 @ 08:38)  WBC Count: 8.80 (09-14-24 @ 08:37)     - Creatinine: 3.4 (09-15-24 @ 20:00)  Creatinine: 2.9 (09-15-24 @ 08:38)       ROS  General: Denies rigors, nightsweats  HEENT: Denies headache, rhinorrhea, sore throat, eye pain  CV: Denies CP, palpitations  PULM: Denies wheezing, hemoptysis  GI: Denies hematemesis, hematochezia, melena  : Denies discharge, hematuria  MSK: Denies arthralgias, myalgias  SKIN: Denies rash, lesions  NEURO: Denies paresthesias, weakness  PSYCH: Denies depression, anxiety     VITALS:  T(F): 97.7, Max: 98 (09-15-24 @ 19:35)  HR: 61  BP: 149/75  RR: 18Vital Signs Last 24 Hrs  T(C): 36.5 (16 Sep 2024 05:20), Max: 36.7 (15 Sep 2024 19:35)  T(F): 97.7 (16 Sep 2024 05:20), Max: 98 (15 Sep 2024 19:35)  HR: 61 (16 Sep 2024 05:20) (61 - 86)  BP: 149/75 (16 Sep 2024 05:20) (144/72 - 149/75)  BP(mean): --  RR: 18 (16 Sep 2024 05:20) (18 - 18)  SpO2: 96% (16 Sep 2024 05:20) (95% - 96%)        PHYSICAL EXAM:  Gen: NAD, resting in bed  HEENT: Normocephalic, atraumatic  Neck: supple, no lymphadenopathy  CV: Regular rate & regular rhythm  Lungs: decreased BS at bases, no fremitus  Abdomen: Soft, BS present  Ext: Warm, well perfused  Neuro: non focal, awake  Skin: no rash, no erythema  Lines: no phlebitis TDC    FH: Non-contributory  Social Hx: Non-contributory    TESTS & MEASUREMENTS:                        10.5   6.69  )-----------( 224      ( 15 Sep 2024 08:38 )             33.3     09-15    131<L>  |  94<L>  |  33<H>  ----------------------------<  127<H>  3.8   |  24  |  3.4<H>    Ca    8.1<L>      15 Sep 2024 20:00  Mg     1.9     09-15    TPro  5.8<L>  /  Alb  2.9<L>  /  TBili  0.4  /  DBili  x   /  AST  31  /  ALT  15  /  AlkPhos  159<H>  09-15      LIVER FUNCTIONS - ( 15 Sep 2024 08:38 )  Alb: 2.9 g/dL / Pro: 5.8 g/dL / ALK PHOS: 159 U/L / ALT: 15 U/L / AST: 31 U/L / GGT: x           Urinalysis Basic - ( 15 Sep 2024 20:00 )    Color: x / Appearance: x / SG: x / pH: x  Gluc: 127 mg/dL / Ketone: x  / Bili: x / Urobili: x   Blood: x / Protein: x / Nitrite: x   Leuk Esterase: x / RBC: x / WBC x   Sq Epi: x / Non Sq Epi: x / Bacteria: x        Culture - Blood (collected 09-12-24 @ 17:10)  Source: .Blood Blood-Peripheral  Gram Stain (09-13-24 @ 16:06):    Growth in aerobic bottle: Gram Positive Cocci in Clusters  Final Report (09-14-24 @ 13:12):    Growth in aerobic bottle: Staphylococcus hominis    Isolation of Coagulase negative Staphylococcus from single blood culture    sets may represent    contamination. Contact the Microbiology Department at [phone] if    susceptibility testing is    clinically indicated.    Direct identification is available within approximately 3-5    hours either by Blood Panel Multiplexed PCR or Direct    MALDI-TOF. Details: [email or web address]  Organism: Blood Culture PCR (09-14-24 @ 13:12)  Organism: Blood Culture PCR (09-14-24 @ 13:12)      Method Type: PCR      -  Staphylococcus epidermidis, Methicillin resistant: Detec    Culture - Blood (collected 09-12-24 @ 17:10)  Source: .Blood Blood-Peripheral  Preliminary Report (09-15-24 @ 23:01):    No growth at 72 Hours        Lactate, Blood: 1.9 mmol/L (09-12-24 @ 17:10)      INFECTIOUS DISEASES TESTING  Vancomycin Level, Trough: 11.1 (09-14-24 @ 08:37)  Vancomycin Level, Random: 11.1 (09-14-24 @ 08:37)  MRSA PCR Result.: Negative (09-13-24 @ 20:40)  Rapid RVP Result: Detected (07-23-24 @ 23:38)  MRSA PCR Result.: Negative (07-22-24 @ 11:50)  Streptococcus pneumoniae Ag, Ur Result: Negative (07-22-24 @ 11:36)  Legionella Antigen, Urine: Negative (07-22-24 @ 11:36)  Procalcitonin: 0.23 (07-20-24 @ 14:32)  Rapid RVP Result: NotDetec (04-08-24 @ 00:15)  Procalcitonin, Serum: 0.70 (03-30-24 @ 09:03)  Rapid RVP Result: NotDetec (03-27-24 @ 12:02)  Procalcitonin, Serum: 0.13 (03-23-24 @ 16:00)  strept    INFLAMMATORY MARKERS  C-Reactive Protein: 76.5 mg/L (09-13-24 @ 07:11)  C-Reactive Protein: 70.5 mg/L (07-25-24 @ 09:20)  Sedimentation Rate, Erythrocyte: 105 mm/Hr (07-25-24 @ 09:20)      RADIOLOGY & ADDITIONAL TESTS:  I have personally reviewed the last available Chest xray  CXR      CT      CARDIOLOGY TESTING  12 Lead ECG:   Ventricular Rate 97 BPM    Atrial Rate 97 BPM    P-R Interval 148 ms    QRS Duration 128 ms    Q-T Interval 384 ms    QTC Calculation(Bazett) 487 ms    P Axis 52 degrees    R Axis -19 degrees    T Axis 137 degrees    Diagnosis Line Sinus rhythm with occasional Premature ventricular complexes and Premature  atrial complexes  Left ventricular hypertrophy with QRS widening and repolarization abnormality   ( [place] product , Romhilt-Sparks )  Abnormal ECG    Confirmed by Abdulkadir Chopra ([id number]) on 9/12/2024 5:53:10 PM (09-12-24 @ 15:41)      MEDICATIONS  apixaban 2.5 Oral two times a day  atorvastatin 20 Oral at bedtime  aztreonam  IVPB 2000 IV Intermittent <User Schedule>  chlorhexidine 2% Cloths 1 Topical daily  folic acid 1 Oral daily  metoprolol succinate  Oral daily  sacubitril 49 mG/valsartan 51 mG 1 Oral two times a day  sertraline 25 Oral daily  timolol 0.5% Solution 1 Both EYES two times a day  vancomycin  IVPB 1000 IV Intermittent <User Schedule>      WEIGHT  Weight (kg): 56.7 (07-20-24 @ 08:59)  Creatinine: 3.4 mg/dL (09-15-24 @ 20:00)  Creatinine: 2.9 mg/dL (09-15-24 @ 08:38)      ANTIBIOTICS:  aztreonam  IVPB 2000 milliGRAM(s) IV Intermittent <User Schedule>  vancomycin  IVPB 1000 milliGRAM(s) IV Intermittent <User Schedule>      All available historical records have been reviewed

## 2024-09-16 NOTE — PROGRESS NOTE ADULT - SUBJECTIVE AND OBJECTIVE BOX
seen and examined  24 h events noted   no distress       PAST HISTORY  --------------------------------------------------------------------------------  No significant changes to PMH, PSH, FHx, SHx, unless otherwise noted    ALLERGIES & MEDICATIONS  --------------------------------------------------------------------------------  Allergies    Keflex (Swelling)  cephalosporins (Angioedema)    Intolerances      Standing Inpatient Medications  apixaban 2.5 milliGRAM(s) Oral two times a day  atorvastatin 20 milliGRAM(s) Oral at bedtime  aztreonam  IVPB 2000 milliGRAM(s) IV Intermittent <User Schedule>  chlorhexidine 2% Cloths 1 Application(s) Topical daily  folic acid 1 milliGRAM(s) Oral daily  metoprolol succinate  milliGRAM(s) Oral daily  sacubitril 49 mG/valsartan 51 mG 1 Tablet(s) Oral two times a day  sertraline 25 milliGRAM(s) Oral daily  timolol 0.5% Solution 1 Drop(s) Both EYES two times a day  vancomycin  IVPB 1000 milliGRAM(s) IV Intermittent <User Schedule>    PRN Inpatient Medications        VITALS/PHYSICAL EXAM  --------------------------------------------------------------------------------  T(C): 36.5 (09-16-24 @ 05:20), Max: 36.7 (09-15-24 @ 19:35)  HR: 61 (09-16-24 @ 05:20) (61 - 86)  BP: 149/75 (09-16-24 @ 05:20) (144/72 - 149/75)  RR: 18 (09-16-24 @ 05:20) (18 - 18)  SpO2: 96% (09-16-24 @ 05:20) (95% - 96%)  Wt(kg): --        Physical Exam:  	Gen: NAD  	Pulm: CTA B/L  	CV: S1S2; no rub  	Abd: distended  	Vascular access:    LABS/STUDIES  --------------------------------------------------------------------------------              11.3   5.33  >-----------<  233      [09-16-24 @ 08:18]              36.2     132  |  94  |  37  ----------------------------<  92      [09-16-24 @ 08:18]  3.4   |  27  |  3.5        Ca     8.1     [09-16-24 @ 08:18]      Mg     2.0     [09-16-24 @ 08:18]    TPro  5.8  /  Alb  3.2  /  TBili  0.4  /  DBili  x   /  AST  26  /  ALT  13  /  AlkPhos  150  [09-16-24 @ 08:18]        Serum Osmolality 281      [09-15-24 @ 20:00]    Creatinine Trend:  SCr 3.5 [09-16 @ 08:18]  SCr 3.4 [09-15 @ 20:00]  SCr 2.9 [09-15 @ 08:38]  SCr 4.1 [09-14 @ 08:37]  SCr 4.0 [09-13 @ 07:11]    Urinalysis - [09-16-24 @ 08:18]      Color  / Appearance  / SG  / pH       Gluc 92 / Ketone   / Bili  / Urobili        Blood  / Protein  / Leuk Est  / Nitrite       RBC  / WBC  / Hyaline  / Gran  / Sq Epi  / Non Sq Epi  / Bacteria       Iron 33, TIBC 147, %sat 22      [04-04-24 @ 06:44]  Ferritin 785      [04-04-24 @ 06:44]  PTH -- (Ca 8.2)      [04-09-24 @ 05:43]   35  PTH -- (Ca 8.2)      [03-24-24 @ 20:00]   138  Vitamin D (25OH) 11      [04-09-24 @ 05:43]  Lipid: chol 144, , HDL 39, LDL --      [03-22-24 @ 06:14]

## 2024-09-16 NOTE — PROGRESS NOTE ADULT - ASSESSMENT
ASSESSMENT  89-year-old female, past medical history hypertension, DVT/PE, heart failure, AICD in place, RA, end-stage renal disease on hemodialysis gets dialysis Tuesday Thursday Saturday presents emergency department from dialysis for acute shortness of breath.  Patient reports received 30 minutes of dialysis and started to feel acutely short of breath.  In ER vital signs significant for tachycardia and fever.  Patient reports breathing feels better at this time.  Patient reports still makes small amount of urine.  Notes that has been burning her over the last several days.. Denies chest pain, abdominal pain, nausea vomiting diarrhea, hematuria, back pain, headache, neck pain.    IMPRESSION:  #Suspected sepsis Tm 101.2 P>90 Hypertensive in the ER    BCX 1/4 bottles Staphylococcus hominis, contaminant vs true infection as has TDC, AICD  #Possible UTI  Reports mild dysuria   Admission WBC 11  BNP > 70k  CT AP unrevealing   Influenza/RSV/COVID19 PCR negative   < from: Xray Chest 1 View-PORTABLE IMMEDIATE (09.12.24 @ 18:05) >  1. Pulmonary edema/interstitial opacities.  2. Small right pleural effusion/basilar opacity.  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  which could results in poor clinical outcomes  #Abx allergy: Keflex (Swelling)  #ESRD on HD  Creatinine: 4.0 (09-13-24 @ 07:11)    Height (cm): 165.1 (09-12-24 @ 15:37)  Weight (kg): 56.7 (07-20-24 @ 08:59)    RECOMMENDATIONS:  - Continue IV aztreonam 2g q24hrs  - Continue IV vancomycin 1000mg post-HD; Vanc dosing AUC/YANY per clinical pharmacist   - Send UA and UCx  - Follow up repeat BCx  - TTE  - Offloading and frequent position changes, aspiration precaution      If any questions, please text or call on [employer] Teams  Please continue to update ID with any pertinent new clinical, laboratory or radiographic findings    ASSESSMENT  89-year-old female, past medical history hypertension, DVT/PE, heart failure, AICD in place, RA, end-stage renal disease on hemodialysis gets dialysis Tuesday Thursday Saturday presents emergency department from dialysis for acute shortness of breath.  Patient reports received 30 minutes of dialysis and started to feel acutely short of breath.  In ER vital signs significant for tachycardia and fever.  Patient reports breathing feels better at this time.  Patient reports still makes small amount of urine.  Notes that has been burning her over the last several days.. Denies chest pain, abdominal pain, nausea vomiting diarrhea, hematuria, back pain, headache, neck pain.    IMPRESSION:  #Suspected sepsis Tm 101.2 P>90 Hypertensive in the ER    BCX 1/4 bottles Staphylococcus hominis, contaminant vs true infection as has TDC, AICD  #Possible UTI  Reports mild dysuria   Admission WBC 11  BNP > 70k  CT AP unrevealing   Influenza/RSV/COVID19 PCR negative   < from: Xray Chest 1 View-PORTABLE IMMEDIATE (09.12.24 @ 18:05) >  1. Pulmonary edema/interstitial opacities.  2. Small right pleural effusion/basilar opacity.  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

## 2024-09-16 NOTE — PROGRESS NOTE ADULT - ASSESSMENT
89-year-old female, past medical history hypertension, DVT/PE, heart failure, AICD in place, RA, end-stage renal disease on hemodialysis gets dialysis Tuesday Thursday Saturday presents emergency department from dialysis for acute shortness of breath.  Patient reports received 30 minutes of dialysis and started to feel acutely short of breath.  In ER vital signs significant for tachycardia and fever.  Patient reports breathing feels better at this time.  Patient reports still makes small amount of urine.  Notes that has been burning her over the last several days.    Impression    #sepsis secondary to staph hominis UTI  - patient not urinating  - was recently refusing straight cath but has since agreed this AM  - will send UA and culture  - per ID: continue aztreonam g q24h and IV vancomycin 1g post HD with AUC/YANY dosing  - follow up repeat bcx  - TTE    #Acute hypoxemic hypercapnic respiratory failure secondary to most likely HFrEF exacerbation, pulmonary edema   - SIRS on admission, (, temp 101.2)  - BNP [id number]  - CXR: bilateral pleural effusions  - CTAP:  Bilateral atelectasis and pleural effusions and cardiomegaly  - echo from July 2024: 30% EF with grade 2 DD  - trop 89 then 91 but patient is ESRD, will not trend  - Na 131, follow BMP  - off nasal cannula now  - strict I&O    #HTN/ HFrEF   -c/w Entresto   -c/w atorvastatin 20 mg qhs  -c/w metoprolol  mg qd  -c/w Eliquis 2.5 mg bid  -Not in overload at this time    # Misc  - DVT Prophylaxis: apixaban  - GI Prophylaxis: - Diet: - Activity: Activity - Ambulate as Tolerated  - Code Status: Full Do Not Resuscitate  - Dispo: acute for now

## 2024-09-16 NOTE — PROGRESS NOTE ADULT - SUBJECTIVE AND OBJECTIVE BOX
Patient is a 89 year old female who presented for SOB during dialysis and was found to be septic on admission secondary to UTI that grew staph hominis.    Overnight/ROS: patient has not made any urine and refused straight cath, ROS is positive for left hip pain and negative for all other systems    Physical Exam:  General: NAD, ill appearing  HEENT: EOMI, PERRLA, conjunctiva and sclera clear, moist mucus membranes  Cardio: +S1/S2, no murmurs, rubs, or gallops, +pedal pulses  Pulm: CTA b/l, no wheezes, rales, or rhonchi  Abd: BSx4, no TTP, nondistended, no organomegaly  Neuro: AAOx4, neurovascularly intact  Skin: No rashes or lesions  MSK: no edema    Vital Signs Last 12 Hrs  T(F): 97.7 (09-16-24 @ 05:20), Max: 97.7 (09-16-24 @ 05:20)  HR: 61 (09-16-24 @ 05:20) (61 - 61)  BP: 149/75 (09-16-24 @ 05:20) (149/75 - 149/75)  BP(mean): --  RR: 18 (09-16-24 @ 05:20) (18 - 18)  SpO2: 96% (09-16-24 @ 05:20) (96% - 96%)  Wt(kg): --      LABS:                          11.3   5.33  )-----------( 233      ( 16 Sep 2024 08:18 )             36.2     09-16    132<L>  |  94<L>  |  37<H>  ----------------------------<  92  3.4<L>   |  27  |  3.5<H>    Ca    8.1<L>      16 Sep 2024 08:18  Mg     2.0     09-16    TPro  5.8<L>  /  Alb  3.2<L>  /  TBili  0.4  /  DBili  x   /  AST  26  /  ALT  13  /  AlkPhos  150<H>  09-16            Urinalysis Basic - ( 16 Sep 2024 08:18 )    Color: x / Appearance: x / SG: x / pH: x  Gluc: 92 mg/dL / Ketone: x  / Bili: x / Urobili: x   Blood: x / Protein: x / Nitrite: x   Leuk Esterase: x / RBC: x / WBC x   Sq Epi: x / Non Sq Epi: x / Bacteria: x    MEDICATIONS  (STANDING):  apixaban 2.5 milliGRAM(s) Oral two times a day  atorvastatin 20 milliGRAM(s) Oral at bedtime  aztreonam  IVPB 2000 milliGRAM(s) IV Intermittent <User Schedule>  chlorhexidine 2% Cloths 1 Application(s) Topical daily  folic acid 1 milliGRAM(s) Oral daily  metoprolol succinate  milliGRAM(s) Oral daily  sacubitril 49 mG/valsartan 51 mG 1 Tablet(s) Oral two times a day  sertraline 25 milliGRAM(s) Oral daily  timolol 0.5% Solution 1 Drop(s) Both EYES two times a day  vancomycin  IVPB 1000 milliGRAM(s) IV Intermittent <User Schedule>    MEDICATIONS  (PRN):

## 2024-09-16 NOTE — PROGRESS NOTE ADULT - TIME BILLING
I have personally seen and examined this patient.  I have reviewed all pertinent clinical information and reviewed all relevant imaging and diagnostic studies personally.   I counseled the patient about diagnostic testing and treatment plan.   I discussed my recommendations with the primary team

## 2024-09-17 LAB
ALBUMIN SERPL ELPH-MCNC: 3 G/DL — LOW (ref 3.5–5.2)
ALP SERPL-CCNC: 146 U/L — HIGH (ref 30–115)
ALT FLD-CCNC: 13 U/L — SIGNIFICANT CHANGE UP (ref 0–41)
ANION GAP SERPL CALC-SCNC: 16 MMOL/L — HIGH (ref 7–14)
AST SERPL-CCNC: 30 U/L — SIGNIFICANT CHANGE UP (ref 0–41)
BASOPHILS # BLD AUTO: 0.08 K/UL — SIGNIFICANT CHANGE UP (ref 0–0.2)
BASOPHILS NFR BLD AUTO: 1.2 % — HIGH (ref 0–1)
BILIRUB SERPL-MCNC: 0.3 MG/DL — SIGNIFICANT CHANGE UP (ref 0.2–1.2)
BUN SERPL-MCNC: 39 MG/DL — HIGH (ref 10–20)
CALCIUM SERPL-MCNC: 8.2 MG/DL — LOW (ref 8.4–10.5)
CHLORIDE SERPL-SCNC: 96 MMOL/L — LOW (ref 98–110)
CO2 SERPL-SCNC: 22 MMOL/L — SIGNIFICANT CHANGE UP (ref 17–32)
CREAT SERPL-MCNC: 3.7 MG/DL — HIGH (ref 0.7–1.5)
CULTURE RESULTS: NO GROWTH — SIGNIFICANT CHANGE UP
CULTURE RESULTS: SIGNIFICANT CHANGE UP
EGFR: 11 ML/MIN/1.73M2 — LOW
EOSINOPHIL # BLD AUTO: 0.63 K/UL — SIGNIFICANT CHANGE UP (ref 0–0.7)
EOSINOPHIL NFR BLD AUTO: 9.4 % — HIGH (ref 0–8)
GLUCOSE BLDC GLUCOMTR-MCNC: 105 MG/DL — HIGH (ref 70–99)
GLUCOSE BLDC GLUCOMTR-MCNC: 122 MG/DL — HIGH (ref 70–99)
GLUCOSE BLDC GLUCOMTR-MCNC: 87 MG/DL — SIGNIFICANT CHANGE UP (ref 70–99)
GLUCOSE SERPL-MCNC: 80 MG/DL — SIGNIFICANT CHANGE UP (ref 70–99)
HCT VFR BLD CALC: 36.8 % — LOW (ref 37–47)
HGB BLD-MCNC: 11.4 G/DL — LOW (ref 12–16)
IMM GRANULOCYTES NFR BLD AUTO: 0.4 % — HIGH (ref 0.1–0.3)
LYMPHOCYTES # BLD AUTO: 2.07 K/UL — SIGNIFICANT CHANGE UP (ref 1.2–3.4)
LYMPHOCYTES # BLD AUTO: 30.8 % — SIGNIFICANT CHANGE UP (ref 20.5–51.1)
MAGNESIUM SERPL-MCNC: 1.9 MG/DL — SIGNIFICANT CHANGE UP (ref 1.8–2.4)
MCHC RBC-ENTMCNC: 29.1 PG — SIGNIFICANT CHANGE UP (ref 27–31)
MCHC RBC-ENTMCNC: 31 G/DL — LOW (ref 32–37)
MCV RBC AUTO: 93.9 FL — SIGNIFICANT CHANGE UP (ref 81–99)
MONOCYTES # BLD AUTO: 0.79 K/UL — HIGH (ref 0.1–0.6)
MONOCYTES NFR BLD AUTO: 11.8 % — HIGH (ref 1.7–9.3)
NEUTROPHILS # BLD AUTO: 3.12 K/UL — SIGNIFICANT CHANGE UP (ref 1.4–6.5)
NEUTROPHILS NFR BLD AUTO: 46.4 % — SIGNIFICANT CHANGE UP (ref 42.2–75.2)
NRBC # BLD: 0 /100 WBCS — SIGNIFICANT CHANGE UP (ref 0–0)
PLATELET # BLD AUTO: 245 K/UL — SIGNIFICANT CHANGE UP (ref 130–400)
PMV BLD: 9.8 FL — SIGNIFICANT CHANGE UP (ref 7.4–10.4)
POTASSIUM SERPL-MCNC: 4 MMOL/L — SIGNIFICANT CHANGE UP (ref 3.5–5)
POTASSIUM SERPL-SCNC: 4 MMOL/L — SIGNIFICANT CHANGE UP (ref 3.5–5)
PROT SERPL-MCNC: 5.9 G/DL — LOW (ref 6–8)
RBC # BLD: 3.92 M/UL — LOW (ref 4.2–5.4)
RBC # FLD: 16 % — HIGH (ref 11.5–14.5)
SODIUM SERPL-SCNC: 134 MMOL/L — LOW (ref 135–146)
SPECIMEN SOURCE: SIGNIFICANT CHANGE UP
SPECIMEN SOURCE: SIGNIFICANT CHANGE UP
VANCOMYCIN FLD-MCNC: 12.4 UG/ML — HIGH (ref 5–10)
WBC # BLD: 6.72 K/UL — SIGNIFICANT CHANGE UP (ref 4.8–10.8)
WBC # FLD AUTO: 6.72 K/UL — SIGNIFICANT CHANGE UP (ref 4.8–10.8)

## 2024-09-17 PROCEDURE — 99232 SBSQ HOSP IP/OBS MODERATE 35: CPT

## 2024-09-17 RX ORDER — VANCOMYCIN HCL-SODIUM CHLORIDE IV SOLN 1.5 GM/250ML-0.9% 1.5-0.9/25 GM/ML-%
750 SOLUTION INTRAVENOUS
Refills: 0 | Status: DISCONTINUED | OUTPATIENT
Start: 2024-09-17 | End: 2024-09-19

## 2024-09-17 RX ADMIN — VANCOMYCIN HCL-SODIUM CHLORIDE IV SOLN 1.5 GM/250ML-0.9% 250 MILLIGRAM(S): 1.5-0.9/25 SOLUTION at 11:44

## 2024-09-17 RX ADMIN — SACUBITRIL AND VALSARTAN 1 TABLET(S): 97; 103 TABLET, FILM COATED ORAL at 19:08

## 2024-09-17 RX ADMIN — ATORVASTATIN CALCIUM 20 MILLIGRAM(S): 10 TABLET, FILM COATED ORAL at 21:31

## 2024-09-17 RX ADMIN — FOLIC ACID 1 MILLIGRAM(S): 1 TABLET ORAL at 12:49

## 2024-09-17 RX ADMIN — APIXABAN 2.5 MILLIGRAM(S): 5 TABLET, FILM COATED ORAL at 05:18

## 2024-09-17 RX ADMIN — APIXABAN 2.5 MILLIGRAM(S): 5 TABLET, FILM COATED ORAL at 19:08

## 2024-09-17 RX ADMIN — Medication 1 DROP(S): at 05:18

## 2024-09-17 RX ADMIN — Medication 1 DROP(S): at 19:08

## 2024-09-17 RX ADMIN — SERTRALINE HYDROCHLORIDE 25 MILLIGRAM(S): 100 TABLET, FILM COATED ORAL at 12:47

## 2024-09-17 RX ADMIN — CHLORHEXIDINE GLUCONATE ORAL RINSE 1 APPLICATION(S): 1.2 SOLUTION DENTAL at 12:46

## 2024-09-17 RX ADMIN — Medication 100 MILLIGRAM(S): at 05:18

## 2024-09-17 RX ADMIN — SACUBITRIL AND VALSARTAN 1 TABLET(S): 97; 103 TABLET, FILM COATED ORAL at 05:19

## 2024-09-17 NOTE — PROGRESS NOTE ADULT - ATTENDING COMMENTS
Patient is an 89-year-old female, past medical history hypertension, DVT/PE, heart failure, AICD in place, RA, end-stage renal disease on hemodialysis gets dialysis Tuesday Thursday Saturday presents emergency department from dialysis for acute shortness of breath, was dx. with sepsis due to staph hominis bacteremia.     #Sepsis, POA (Febrile 101.2 and tachycardic)  # Suspected UTI- urine cxs sent on 9/16- f/up results   # Staph hominis bacteremia    CTAP 09/12 negative for acute pathology  Blood Cxs  09/12 - staph hominis and staph. epidermitis meth. resistant  MRSA PCR neg, repeated blood cxs neg prelim.  - ID  vancomycin 1g post HD x 14 days   - obtain 2D echo       #Acute Hypoxic Respiratory Failure, resolved  CXR 09/12 showing pulmonary edema/interstitial opacities  - fluid removal via HD    #ESRD on HD TTS - HD per nephrology    #HTN  #Chronic HFrEF 25-30%  #HO PE/DVT  #H/o NSVT   Echo: (7/23)  1. Normal left ventricular internal cavity size.   2. Severely decreased global left ventricular systolic function.   3. Left ventricular ejection fraction, by visual estimation, is 25 to 30%.  -c/w Entresto   -c/w atorvastatin 20 mg qhs  -c/w metoprolol  mg qd  -c/w Eliquis 2.5 mg bid    # Macrocytic anemia - stable h/h, obtain Vit B12, folate levels    DVT PPX, Eliquis    Code status: DNR/DNI    #Progress Note Handoff: continue IV Vanc , level to be monitored by pharmacy stuff as per protocol, f/up final results of urine and blood cxs, complete TTE, HD tx. as per Nephrology team recs.  Family discussion: yes, medical team Disposition: Home__once medically stable     Total time spent to complete patient's bedside assessment, review medical chart, discuss medical plan of care with covering medical team was more than 35 minutes with >50% of time spent face to face with patient, discussion with patient/family and/or coordination of care

## 2024-09-17 NOTE — PROGRESS NOTE ADULT - ASSESSMENT
ASSESSMENT  89-year-old female, past medical history hypertension, DVT/PE, heart failure, AICD in place, RA, end-stage renal disease on hemodialysis gets dialysis Tuesday Thursday Saturday presents emergency department from dialysis for acute shortness of breath.  Patient reports received 30 minutes of dialysis and started to feel acutely short of breath.  In ER vital signs significant for tachycardia and fever.  Patient reports breathing feels better at this time.  Patient reports still makes small amount of urine.  Notes that has been burning her over the last several days.. Denies chest pain, abdominal pain, nausea vomiting diarrhea, hematuria, back pain, headache, neck pain.    IMPRESSION:  #Suspected sepsis Tm 101.2 P>90 Hypertensive in the ER    9/15 BCX NGTD     9/12 BCX 1/4 bottles Staphylococcus hominis, contaminant vs true infection as has TDC, AICD  #Possible UTI  Reports mild dysuria     UA without significant pyuria (after days of abx)  Admission WBC 11  BNP > 70k  CT AP unrevealing   Influenza/RSV/COVID19 PCR negative   < from: Xray Chest 1 View-PORTABLE IMMEDIATE (09.12.24 @ 18:05) >  1. Pulmonary edema/interstitial opacities.  2. Small right pleural effusion/basilar opacity.  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  which could results in poor clinical outcomes  #Abx allergy: Keflex (Swelling)  #ESRD on HD  Creatinine: 4.0 (09-13-24 @ 07:11)    Height (cm): 165.1 (09-12-24 @ 15:37)  Weight (kg): 56.7 (07-20-24 @ 08:59)    RECOMMENDATIONS:  - D/C aztreonam   - Vanc dosing AUC/YANY per clinical pharmacist - change to 750mg post HD to be given at dialysis via TDC x 14 days end 9/26   - TTE   - Offloading and frequent position changes, aspiration precaution      If any questions, please text or call on [employer] Teams  Please continue to update ID with any pertinent new clinical, laboratory or radiographic findings

## 2024-09-17 NOTE — PHARMACOTHERAPY INTERVENTION NOTE - COMMENTS
Patient ordered vancomycin 1000mg IV post-HD on Tu/Th/Sat for treatment of possible Staph hominis bacteremia. Pre-HD vancomycin level on 9/17, after patient received vancomycin 750mg IV x 1 post HD on 9/14,  was 12.4 mg/L. Based on patient's weight and today's pre-HD level, recommended decreasing vancomycin dose to 750mg IV post-HD on Tu/Th/Sat, and obtaining a repeat pre-HD vancomycin level on 9/19 with AM labs to reassess.

## 2024-09-17 NOTE — PROGRESS NOTE ADULT - ASSESSMENT
89-year-old female, past medical history hypertension, DVT/PE, heart failure, AICD in place, RA, end-stage renal disease on hemodialysis gets dialysis Tuesday Thursday Saturday presents emergency department from dialysis for acute shortness of breath.  Patient reports received 30 minutes of dialysis and started to feel acutely short of breath.  In ER vital signs significant for tachycardia and fever.  Patient reports breathing feels better at this time.  Patient reports still makes small amount of urine.  Notes that has been burning her over the last several days.    Impression    #sepsis secondary to staph hominis UTI  - patient not urinating, straight cathed  - urinalysis and culture pending  - per ID: continue aztreonam g q24h and IV vancomycin 1g post HD with AUC/YANY dosing  - repeat bcx negative  - TTE  - HD today through TDC    #Acute hypoxemic hypercapnic respiratory failure secondary to most likely HFrEF exacerbation, pulmonary edema   - SIRS on admission, (, temp 101.2)  - BNP 67335  - CXR: bilateral pleural effusions  - CTAP:  Bilateral atelectasis and pleural effusions and cardiomegaly  - echo from July 2024: 30% EF with grade 2 DD  - trop 89 then 91 but patient is ESRD, will not trend  - Na 131, follow BMP  - off nasal cannula now  - strict I&O    #HTN/ HFrEF   -c/w Entresto   -c/w atorvastatin 20 mg qhs  -c/w metoprolol  mg qd  -c/w Eliquis 2.5 mg bid  -Not in overload at this time      # Misc  - DVT Prophylaxis: apixaban  - GI Prophylaxis: - Diet: - Activity: Activity - Ambulate as Tolerated  - Code Status: Full Do Not Resuscitate  - Dispo: acute for now

## 2024-09-17 NOTE — PROGRESS NOTE ADULT - SUBJECTIVE AND OBJECTIVE BOX
Patient is a 89 year old female who presented for SOB during dialysis and was found to be septic on admission secondary to UTI that grew staph hominis.    Overnight/ROS: HD today, no overnight events, ROS is negative for all other systems    Physical Exam:  General: NAD, ill appearing  HEENT: EOMI, PERRLA, conjunctiva and sclera clear, moist mucus membranes  Cardio: +S1/S2, no murmurs, rubs, or gallops, +pedal pulses  Pulm: CTA b/l, no wheezes, rales, or rhonchi  Abd: BSx4, no TTP, nondistended, no organomegaly  Neuro: AAOx4, neurovascularly intact  Skin: No rashes or lesions  MSK: no edema      Vital Signs Last 12 Hrs  T(F): 99.1 (09-17-24 @ 04:40), Max: 99.1 (09-17-24 @ 04:40)  HR: 67 (09-17-24 @ 04:40) (67 - 82)  BP: 141/74 (09-17-24 @ 04:40) (135/80 - 141/74)  BP(mean): --  RR: 18 (09-17-24 @ 04:40) (18 - 18)  SpO2: 97% (09-17-24 @ 04:40) (97% - 97%)  Wt(kg): --      LABS:                          11.4   6.72  )-----------( 245      ( 17 Sep 2024 06:46 )             36.8     09-17    134[L]  |  96[L]  |  39[H]  ----------------------------<  80  4.0   |  22  |  3.7[H]    Ca    8.2[L]      17 Sep 2024 06:46  Mg     1.9     09-17    TPro  5.9[L]  /  Alb  3.0[L]  /  TBili  0.3  /  DBili  x   /  AST  30  /  ALT  13  /  AlkPhos  146[H]  09-17            Urinalysis Basic - ( 17 Sep 2024 06:46 )    Color: x / Appearance: x / SG: x / pH: x  Gluc: 80 mg/dL / Ketone: x  / Bili: x / Urobili: x   Blood: x / Protein: x / Nitrite: x   Leuk Esterase: x / RBC: x / WBC x   Sq Epi: x / Non Sq Epi: x / Bacteria: x    .Blood None  09-15 @ 08:38   No growth at 24 hours  --  --      .Blood Blood-Peripheral  09-12 @ 17:10   No growth at 4 days  --  Blood Culture PCR      .Blood None  07-24 @ 11:41   No growth at 5 days  --  --      .Blood Blood  07-20 @ 11:31   No growth at 5 days  --  --      MEDICATIONS  (STANDING):  apixaban 2.5 milliGRAM(s) Oral two times a day  atorvastatin 20 milliGRAM(s) Oral at bedtime  aztreonam  IVPB 2000 milliGRAM(s) IV Intermittent <User Schedule>  chlorhexidine 2% Cloths 1 Application(s) Topical daily  folic acid 1 milliGRAM(s) Oral daily  metoprolol succinate  milliGRAM(s) Oral daily  sacubitril 49 mG/valsartan 51 mG 1 Tablet(s) Oral two times a day  sertraline 25 milliGRAM(s) Oral daily  timolol 0.5% Solution 1 Drop(s) Both EYES two times a day  vancomycin  IVPB 1000 milliGRAM(s) IV Intermittent <User Schedule>    MEDICATIONS  (PRN):

## 2024-09-17 NOTE — PROGRESS NOTE ADULT - SUBJECTIVE AND OBJECTIVE BOX
seen and examined   24 h events noted   no distress         PAST HISTORY  --------------------------------------------------------------------------------  No significant changes to PMH, PSH, FHx, SHx, unless otherwise noted    ALLERGIES & MEDICATIONS  --------------------------------------------------------------------------------  Allergies    Keflex (Swelling)  cephalosporins (Angioedema)    Intolerances      Standing Inpatient Medications  apixaban 2.5 milliGRAM(s) Oral two times a day  atorvastatin 20 milliGRAM(s) Oral at bedtime  aztreonam  IVPB 2000 milliGRAM(s) IV Intermittent <User Schedule>  chlorhexidine 2% Cloths 1 Application(s) Topical daily  folic acid 1 milliGRAM(s) Oral daily  metoprolol succinate  milliGRAM(s) Oral daily  sacubitril 49 mG/valsartan 51 mG 1 Tablet(s) Oral two times a day  sertraline 25 milliGRAM(s) Oral daily  timolol 0.5% Solution 1 Drop(s) Both EYES two times a day  vancomycin  IVPB 1000 milliGRAM(s) IV Intermittent <User Schedule>    PRN Inpatient Medications          VITALS/PHYSICAL EXAM  --------------------------------------------------------------------------------  T(C): 37.3 (09-17-24 @ 04:40), Max: 37.3 (09-17-24 @ 04:40)  HR: 67 (09-17-24 @ 04:40) (67 - 82)  BP: 141/74 (09-17-24 @ 04:40) (130/71 - 141/74)  RR: 18 (09-17-24 @ 04:40) (18 - 18)  SpO2: 97% (09-17-24 @ 04:40) (97% - 97%)  Wt(kg): --    Weight (kg): 56 (09-16-24 @ 15:32)      Physical Exam:  	Gen: NAD  	Pulm: CTA B/L  	CV:  S1S2; no rub  	Abd: soft, /nondistended  	Vascular access:    LABS/STUDIES  --------------------------------------------------------------------------------              11.3   5.33  >-----------<  233      [09-16-24 @ 08:18]              36.2     132  |  94  |  37  ----------------------------<  92      [09-16-24 @ 08:18]  3.4   |  27  |  3.5        Ca     8.1     [09-16-24 @ 08:18]      Mg     2.0     [09-16-24 @ 08:18]    TPro  5.8  /  Alb  3.2  /  TBili  0.4  /  DBili  x   /  AST  26  /  ALT  13  /  AlkPhos  150  [09-16-24 @ 08:18]        Serum Osmolality 281      [09-15-24 @ 20:00]    Creatinine Trend:  SCr 3.5 [09-16 @ 08:18]  SCr 3.4 [09-15 @ 20:00]  SCr 2.9 [09-15 @ 08:38]  SCr 4.1 [09-14 @ 08:37]  SCr 4.0 [09-13 @ 07:11]    Urinalysis - [09-16-24 @ 10:11]      Color Yellow / Appearance Clear / SG 1.012 / pH 7.5      Gluc Negative / Ketone Negative  / Bili Negative / Urobili 1.0       Blood Negative / Protein 300 / Leuk Est Moderate / Nitrite Negative      RBC 1 / WBC 8 / Hyaline  / Gran  / Sq Epi  / Non Sq Epi 2 / Bacteria Negative    Urine Sodium 39.0      [09-15-24 @ 10:28]  Urine Potassium 22      [09-15-24 @ 10:28]  Urine Osmolality 210      [09-15-24 @ 10:28]    Iron 33, TIBC 147, %sat 22      [04-04-24 @ 06:44]  Ferritin 785      [04-04-24 @ 06:44]  PTH -- (Ca 8.2)      [04-09-24 @ 05:43]   35  PTH -- (Ca 8.2)      [03-24-24 @ 20:00]   138  Vitamin D (25OH) 11      [04-09-24 @ 05:43]  Lipid: chol 144, , HDL 39, LDL --      [03-22-24 @ 06:14]

## 2024-09-17 NOTE — PROGRESS NOTE ADULT - ASSESSMENT
89-year-old female, past medical history hypertension, DVT/PE, heart failure, AICD in place, RA, end-stage renal disease on hemodialysis gets dialysis Tuesday Thursday Saturday admitted for acute hypoxemic respiratory failure. Nephrology consulted regarding HD  # ESRD on HD.   - Has right tunneled catheter. hd today standard bath uf 1 liter as tolerated   - BC / staph epi / noted  ID f/up / on aztreonam vanco / follow trough  patient has TDC / repeat BC 9/15 negative  / follow vanco level   -last Phos at target, not on binders   - h/h noted / no need for ELIJAH   - bp conrolled   will follow

## 2024-09-17 NOTE — PROGRESS NOTE ADULT - NS ATTEST RISK PROBLEM GEN_ALL_CORE FT
- This patient is on drug therapy requiring intensive monitoring for toxicity. Vancomycin/ requires intensive drug monitoring due to concerns for possible adverse effects, including acute kidney injury, and will be monitored with basic metabolic panel, Vancomycin levels, to be done while on therapy
- I independently interpreted the most CXR- L effusion, no PNA   - I discussed my recommendations with the primary team housestaff / Attending   - This patient is on drug therapy requiring intensive monitoring for toxicity. Vancomycin/ requires intensive drug monitoring due to concerns for possible adverse effects, including acute kidney injury, and will be monitored with basic metabolic panel, , Vancomycin levels, to be done while on therapy
- This patient is on drug therapy requiring intensive monitoring for toxicity. Vancomycin/ requires intensive drug monitoring due to concerns for possible adverse effects, including acute kidney injury, and will be monitored with basic metabolic panel, Vancomycin levels, to be done while on therapy

## 2024-09-17 NOTE — PROGRESS NOTE ADULT - SUBJECTIVE AND OBJECTIVE BOX
RACHEL SUMMERS  89y, Female  Allergy: Keflex (Swelling)  cephalosporins (Angioedema)      LOS  5d    CHIEF COMPLAINT: chest pain/sob (17 Sep 2024 09:16)      INTERVAL EVENTS/HPI  - No acute events overnight  - T(F): , Max: 99.1 (09-17-24 @ 04:40)  - Denies any worsening symptoms  - Tolerating medication  - WBC Count: 6.72 (09-17-24 @ 06:46)  WBC Count: 5.33 (09-16-24 @ 08:18)     - Creatinine: 3.7 (09-17-24 @ 06:46)  Creatinine: 3.5 (09-16-24 @ 08:18)       ROS  General: Denies rigors, nightsweats  HEENT: Denies headache, rhinorrhea, sore throat, eye pain  CV: Denies CP, palpitations  PULM: Denies wheezing, hemoptysis  GI: Denies hematemesis, hematochezia, melena  : Denies discharge, hematuria  MSK: Denies arthralgias, myalgias  SKIN: Denies rash, lesions  NEURO: Denies paresthesias, weakness  PSYCH: Denies depression, anxiety    VITALS:  T(F): 99.1, Max: 99.1 (09-17-24 @ 04:40)  HR: 68  BP: 141/67  RR: 18Vital Signs Last 24 Hrs  T(C): 37.3 (17 Sep 2024 04:40), Max: 37.3 (17 Sep 2024 04:40)  T(F): 99.1 (17 Sep 2024 04:40), Max: 99.1 (17 Sep 2024 04:40)  HR: 68 (17 Sep 2024 08:50) (67 - 82)  BP: 141/67 (17 Sep 2024 08:50) (130/71 - 141/74)  BP(mean): --  RR: 18 (17 Sep 2024 08:50) (18 - 18)  SpO2: 97% (17 Sep 2024 04:40) (97% - 97%)    Parameters below as of 17 Sep 2024 08:50  Patient On (Oxygen Delivery Method): room air        PHYSICAL EXAM:  Gen: NAD, resting in bed  HEENT: Normocephalic, atraumatic  Neck: supple, no lymphadenopathy  CV: Regular rate & regular rhythm  Lungs: decreased BS at bases, no fremitus  Abdomen: Soft, BS present  Ext: Warm, well perfused  Neuro: non focal, awake  Skin: no rash, no erythema  Lines: no phlebitis tdc    FH: Non-contributory  Social Hx: Non-contributory    TESTS & MEASUREMENTS:                        11.4   6.72  )-----------( 245      ( 17 Sep 2024 06:46 )             36.8     09-17    134[L]  |  96[L]  |  39[H]  ----------------------------<  80  4.0   |  22  |  3.7[H]    Ca    8.2[L]      17 Sep 2024 06:46  Mg     1.9     09-17    TPro  5.9[L]  /  Alb  3.0[L]  /  TBili  0.3  /  DBili  x   /  AST  30  /  ALT  13  /  AlkPhos  146[H]  09-17      LIVER FUNCTIONS - ( 17 Sep 2024 06:46 )  Alb: 3.0 g/dL / Pro: 5.9 g/dL / ALK PHOS: 146 U/L / ALT: 13 U/L / AST: 30 U/L / GGT: x           Urinalysis Basic - ( 17 Sep 2024 06:46 )    Color: x / Appearance: x / SG: x / pH: x  Gluc: 80 mg/dL / Ketone: x  / Bili: x / Urobili: x   Blood: x / Protein: x / Nitrite: x   Leuk Esterase: x / RBC: x / WBC x   Sq Epi: x / Non Sq Epi: x / Bacteria: x        Culture - Blood (collected 09-15-24 @ 08:38)  Source: .Blood None  Preliminary Report (09-16-24 @ 17:02):    No growth at 24 hours    Culture - Blood (collected 09-12-24 @ 17:10)  Source: .Blood Blood-Peripheral  Gram Stain (09-13-24 @ 16:06):    Growth in aerobic bottle: Gram Positive Cocci in Clusters  Final Report (09-14-24 @ 13:12):    Growth in aerobic bottle: Staphylococcus hominis    Isolation of Coagulase negative Staphylococcus from single blood culture    sets may represent    contamination. Contact the Microbiology Department at [phone] if    susceptibility testing is    clinically indicated.    Direct identification is available within approximately 3-5    hours either by Blood Panel Multiplexed PCR or Direct    MALDI-TOF. Details: [email or web address]  Organism: Blood Culture PCR (09-14-24 @ 13:12)  Organism: Blood Culture PCR (09-14-24 @ 13:12)      Method Type: PCR      -  Staphylococcus epidermidis, Methicillin resistant: Detec    Culture - Blood (collected 09-12-24 @ 17:10)  Source: .Blood Blood-Peripheral  Preliminary Report (09-16-24 @ 23:00):    No growth at 4 days        Lactate, Blood: 1.9 mmol/L (09-12-24 @ 17:10)      INFECTIOUS DISEASES TESTING  Vancomycin Level, Random: 12.4 (09-17-24 @ 06:46)  Vancomycin Level, Trough: 11.1 (09-14-24 @ 08:37)  Vancomycin Level, Random: 11.1 (09-14-24 @ 08:37)  MRSA PCR Result.: Negative (09-13-24 @ 20:40)  Rapid RVP Result: Detected (07-23-24 @ 23:38)  MRSA PCR Result.: Negative (07-22-24 @ 11:50)  Streptococcus pneumoniae Ag, Ur Result: Negative (07-22-24 @ 11:36)  Legionella Antigen, Urine: Negative (07-22-24 @ 11:36)  Procalcitonin: 0.23 (07-20-24 @ 14:32)  Rapid RVP Result: NotDetec (04-08-24 @ 00:15)  Hepatitis C Virus Interpretation Result: Nonreact (04-01-24 @ 15:52)  Procalcitonin, Serum: 0.70 (03-30-24 @ 09:03)  Rapid RVP Result: NotDetec (03-27-24 @ 12:02)  Procalcitonin, Serum: 0.13 (03-23-24 @ 16:00)      INFLAMMATORY MARKERS  C-Reactive Protein: 76.5 mg/L (09-13-24 @ 07:11)  C-Reactive Protein: 70.5 mg/L (07-25-24 @ 09:20)  Sedimentation Rate, Erythrocyte: 105 mm/Hr (07-25-24 @ 09:20)      RADIOLOGY & ADDITIONAL TESTS:  I have personally reviewed the last available Chest xray  CXR      CT      CARDIOLOGY TESTING  12 Lead ECG:   Ventricular Rate 97 BPM    Atrial Rate 97 BPM    P-R Interval 148 ms    QRS Duration 128 ms    Q-T Interval 384 ms    QTC Calculation(Bazett) 487 ms    P Axis 52 degrees    R Axis -19 degrees    T Axis 137 degrees    Diagnosis Line Sinus rhythm with occasional Premature ventricular complexes and Premature  atrial complexes  Left ventricular hypertrophy with QRS widening and repolarization abnormality   ( Streator product , Romhilt-Sparks )  Abnormal ECG    Confirmed by Abdulkadir Chopra ([id number]) on 9/12/2024 5:53:10 PM (09-12-24 @ 15:41)      MEDICATIONS  apixaban 2.5 Oral two times a day  atorvastatin 20 Oral at bedtime  aztreonam  IVPB 2000 IV Intermittent <User Schedule>  chlorhexidine 2% Cloths 1 Topical daily  folic acid 1 Oral daily  metoprolol succinate  Oral daily  sacubitril 49 mG/valsartan 51 mG 1 Oral two times a day  sertraline 25 Oral daily  timolol 0.5% Solution 1 Both EYES two times a day  vancomycin  IVPB 1000 IV Intermittent <User Schedule>      WEIGHT  Weight (kg): 56 (09-16-24 @ 15:32)  Creatinine: 3.7 mg/dL (09-17-24 @ 06:46)      ANTIBIOTICS:  aztreonam  IVPB 2000 milliGRAM(s) IV Intermittent <User Schedule>  vancomycin  IVPB 1000 milliGRAM(s) IV Intermittent <User Schedule>      All available historical records have been reviewed

## 2024-09-18 ENCOUNTER — RESULT REVIEW (OUTPATIENT)
Age: 89
End: 2024-09-18

## 2024-09-18 ENCOUNTER — TRANSCRIPTION ENCOUNTER (OUTPATIENT)
Age: 89
End: 2024-09-18

## 2024-09-18 LAB
ALBUMIN SERPL ELPH-MCNC: 3 G/DL — LOW (ref 3.5–5.2)
ALP SERPL-CCNC: 138 U/L — HIGH (ref 30–115)
ALT FLD-CCNC: 13 U/L — SIGNIFICANT CHANGE UP (ref 0–41)
ANION GAP SERPL CALC-SCNC: 13 MMOL/L — SIGNIFICANT CHANGE UP (ref 7–14)
AST SERPL-CCNC: 25 U/L — SIGNIFICANT CHANGE UP (ref 0–41)
BASOPHILS # BLD AUTO: 0.06 K/UL — SIGNIFICANT CHANGE UP (ref 0–0.2)
BASOPHILS NFR BLD AUTO: 0.9 % — SIGNIFICANT CHANGE UP (ref 0–1)
BILIRUB SERPL-MCNC: 0.4 MG/DL — SIGNIFICANT CHANGE UP (ref 0.2–1.2)
BUN SERPL-MCNC: 23 MG/DL — HIGH (ref 10–20)
CALCIUM SERPL-MCNC: 8.1 MG/DL — LOW (ref 8.4–10.4)
CHLORIDE SERPL-SCNC: 98 MMOL/L — SIGNIFICANT CHANGE UP (ref 98–110)
CO2 SERPL-SCNC: 26 MMOL/L — SIGNIFICANT CHANGE UP (ref 17–32)
CREAT SERPL-MCNC: 2.7 MG/DL — HIGH (ref 0.7–1.5)
EGFR: 16 ML/MIN/1.73M2 — LOW
EOSINOPHIL # BLD AUTO: 0.32 K/UL — SIGNIFICANT CHANGE UP (ref 0–0.7)
EOSINOPHIL NFR BLD AUTO: 4.5 % — SIGNIFICANT CHANGE UP (ref 0–8)
GLUCOSE BLDC GLUCOMTR-MCNC: 100 MG/DL — HIGH (ref 70–99)
GLUCOSE SERPL-MCNC: 83 MG/DL — SIGNIFICANT CHANGE UP (ref 70–99)
HCT VFR BLD CALC: 36 % — LOW (ref 37–47)
HGB BLD-MCNC: 11.3 G/DL — LOW (ref 12–16)
IMM GRANULOCYTES NFR BLD AUTO: 0.3 % — SIGNIFICANT CHANGE UP (ref 0.1–0.3)
LYMPHOCYTES # BLD AUTO: 2 K/UL — SIGNIFICANT CHANGE UP (ref 1.2–3.4)
LYMPHOCYTES # BLD AUTO: 28.4 % — SIGNIFICANT CHANGE UP (ref 20.5–51.1)
MCHC RBC-ENTMCNC: 29.4 PG — SIGNIFICANT CHANGE UP (ref 27–31)
MCHC RBC-ENTMCNC: 31.4 G/DL — LOW (ref 32–37)
MCV RBC AUTO: 93.5 FL — SIGNIFICANT CHANGE UP (ref 81–99)
MONOCYTES # BLD AUTO: 1.01 K/UL — HIGH (ref 0.1–0.6)
MONOCYTES NFR BLD AUTO: 14.3 % — HIGH (ref 1.7–9.3)
NEUTROPHILS # BLD AUTO: 3.64 K/UL — SIGNIFICANT CHANGE UP (ref 1.4–6.5)
NEUTROPHILS NFR BLD AUTO: 51.6 % — SIGNIFICANT CHANGE UP (ref 42.2–75.2)
NRBC # BLD: 0 /100 WBCS — SIGNIFICANT CHANGE UP (ref 0–0)
PLATELET # BLD AUTO: 242 K/UL — SIGNIFICANT CHANGE UP (ref 130–400)
PMV BLD: 9.9 FL — SIGNIFICANT CHANGE UP (ref 7.4–10.4)
POTASSIUM SERPL-MCNC: 3.7 MMOL/L — SIGNIFICANT CHANGE UP (ref 3.5–5)
POTASSIUM SERPL-SCNC: 3.7 MMOL/L — SIGNIFICANT CHANGE UP (ref 3.5–5)
PROT SERPL-MCNC: 5.7 G/DL — LOW (ref 6–8)
RBC # BLD: 3.85 M/UL — LOW (ref 4.2–5.4)
RBC # FLD: 16.1 % — HIGH (ref 11.5–14.5)
SODIUM SERPL-SCNC: 137 MMOL/L — SIGNIFICANT CHANGE UP (ref 135–146)
WBC # BLD: 7.05 K/UL — SIGNIFICANT CHANGE UP (ref 4.8–10.8)
WBC # FLD AUTO: 7.05 K/UL — SIGNIFICANT CHANGE UP (ref 4.8–10.8)

## 2024-09-18 PROCEDURE — 99232 SBSQ HOSP IP/OBS MODERATE 35: CPT

## 2024-09-18 PROCEDURE — 93306 TTE W/DOPPLER COMPLETE: CPT | Mod: 26

## 2024-09-18 RX ADMIN — SACUBITRIL AND VALSARTAN 1 TABLET(S): 97; 103 TABLET, FILM COATED ORAL at 05:52

## 2024-09-18 RX ADMIN — ATORVASTATIN CALCIUM 20 MILLIGRAM(S): 10 TABLET, FILM COATED ORAL at 21:52

## 2024-09-18 RX ADMIN — Medication 1 DROP(S): at 18:59

## 2024-09-18 RX ADMIN — APIXABAN 2.5 MILLIGRAM(S): 5 TABLET, FILM COATED ORAL at 05:52

## 2024-09-18 RX ADMIN — Medication 1 DROP(S): at 05:52

## 2024-09-18 RX ADMIN — CHLORHEXIDINE GLUCONATE ORAL RINSE 1 APPLICATION(S): 1.2 SOLUTION DENTAL at 13:03

## 2024-09-18 RX ADMIN — SACUBITRIL AND VALSARTAN 1 TABLET(S): 97; 103 TABLET, FILM COATED ORAL at 18:58

## 2024-09-18 RX ADMIN — APIXABAN 2.5 MILLIGRAM(S): 5 TABLET, FILM COATED ORAL at 18:58

## 2024-09-18 RX ADMIN — FOLIC ACID 1 MILLIGRAM(S): 1 TABLET ORAL at 12:59

## 2024-09-18 RX ADMIN — Medication 100 MILLIGRAM(S): at 05:52

## 2024-09-18 RX ADMIN — SERTRALINE HYDROCHLORIDE 25 MILLIGRAM(S): 100 TABLET, FILM COATED ORAL at 13:03

## 2024-09-18 NOTE — DISCHARGE NOTE PROVIDER - NSDCMRMEDTOKEN_GEN_ALL_CORE_FT
apixaban 2.5 mg oral tablet: 1 tab(s) orally 2 times a day  atorvastatin 20 mg oral tablet: 1 tab(s) orally once a day (at bedtime)  folic acid 1 mg oral tablet: 1 tab(s) orally once a day  metoprolol succinate 100 mg oral tablet, extended release: 1 tab(s) orally once a day  sacubitril-valsartan 49 mg-51 mg oral tablet: 1 tab(s) orally 2 times a day  sertraline 25 mg oral tablet: 1 tab(s) orally once a day  timolol maleate 0.5% ophthalmic solution: 1 drop(s) to each affected eye 2 times a day   apixaban 2.5 mg oral tablet: 1 tab(s) orally 2 times a day  atorvastatin 20 mg oral tablet: 1 tab(s) orally once a day (at bedtime)  folic acid 1 mg oral tablet: 1 tab(s) orally once a day  metoprolol succinate 100 mg oral tablet, extended release: 1 tab(s) orally once a day  sacubitril-valsartan 49 mg-51 mg oral tablet: 1 tab(s) orally 2 times a day  sertraline 25 mg oral tablet: 1 tab(s) orally once a day  timolol maleate 0.5% ophthalmic solution: 1 drop(s) to each affected eye 2 times a day  vancomycin 750 mg intravenous injection: 750 milligram(s) intravenous 3 times a week after hemodialysis session till 9/26.   acetaminophen 325 mg oral tablet: 2 tab(s) orally every 6 hours as needed for  mild pain  apixaban 2.5 mg oral tablet: 1 tab(s) orally 2 times a day  atorvastatin 20 mg oral tablet: 1 tab(s) orally once a day (at bedtime)  folic acid 1 mg oral tablet: 1 tab(s) orally once a day  metoprolol succinate 100 mg oral tablet, extended release: 1 tab(s) orally once a day  sacubitril-valsartan 49 mg-51 mg oral tablet: 1 tab(s) orally 2 times a day  sertraline 25 mg oral tablet: 1 tab(s) orally once a day  timolol maleate 0.5% ophthalmic solution: 1 drop(s) to each affected eye 2 times a day  traMADol 50 mg oral tablet: 1 tab(s) orally every 6 hours As needed Moderate Pain (4 - 6)  vancomycin 750 mg intravenous injection: 750 milligram(s) intravenous 3 times a week after hemodialysis session till 9/26.

## 2024-09-18 NOTE — DISCHARGE NOTE PROVIDER - NSDCCPCAREPLAN_GEN_ALL_CORE_FT
PRINCIPAL DISCHARGE DIAGNOSIS  Diagnosis: Other specified sepsis  Assessment and Plan of Treatment: WHAT YOU NEED TO KNOW:  A urinary tract infection (UTI) is caused by bacteria that get inside your urinary tract. Most bacteria that enter your urinary tract come out when you urinate. If the bacteria stay in your urinary tract, you may get an infection. Your urinary tract includes your kidneys, ureters, bladder, and urethra. Urine is made in your kidneys, and it flows from the ureters to the bladder. Urine leaves the bladder through the urethra. A UTI is more common in your lower urinary tract, which includes your bladder and urethra.  Seek care immediately if:  •You are urinating very little or not at all.  •You have a high fever with shaking chills.  •You have side or back pain that gets worse.  Contact your healthcare provider if:  •You have a fever.  •You do not feel better after 2 days of taking antibiotics.  •You are vomiting.  •You have questions or concerns about your condition or care.  Prevent another UTI:  •Empty your bladder often. Urinate and empty your bladder as soon as you feel the need. Do not hold your urine for long periods of time.  •Wipe from front to back after you urinate or have a bowel movement. This will help prevent germs from getting into your urinary tract through your urethra.  •Drink liquids as directed. Ask how much liquid to drink each day and which liquids are best for you. You may need to drink more liquids than usual to help flush out the bacteria. Do not drink alcohol, caffeine, or citrus juices. These can irritate your bladder and increase your symptoms. Your healthcare provider may recommend cranberry juice to help prevent a UTI       PRINCIPAL DISCHARGE DIAGNOSIS  Diagnosis: Other specified sepsis  Assessment and Plan of Treatment: WHAT YOU NEED TO KNOW:  A urinary tract infection (UTI) is caused by bacteria that get inside your urinary tract. Most bacteria that enter your urinary tract come out when you urinate. If the bacteria stay in your urinary tract, you may get an infection. Your urinary tract includes your kidneys, ureters, bladder, and urethra. Urine is made in your kidneys, and it flows from the ureters to the bladder. Urine leaves the bladder through the urethra. A UTI is more common in your lower urinary tract, which includes your bladder and urethra.  Seek care immediately if:  •You are urinating very little or not at all.  •You have a high fever with shaking chills.  •You have side or back pain that gets worse.  Contact your healthcare provider if:  •You have a fever.  •You do not feel better after 2 days of taking antibiotics.  •You are vomiting.  •You have questions or concerns about your condition or care.  Prevent another UTI:  •Empty your bladder often. Urinate and empty your bladder as soon as you feel the need. Do not hold your urine for long periods of time.  •Wipe from front to back after you urinate or have a bowel movement. This will help prevent germs from getting into your urinary tract through your urethra.  •Drink liquids as directed. Ask how much liquid to drink each day and which liquids are best for you. You may need to drink more liquids than usual to help flush out the bacteria. Do not drink alcohol, caffeine, or citrus juices. These can irritate your bladder and increase your symptoms. Your healthcare provider may recommend cranberry juice to help prevent a UTI        SECONDARY DISCHARGE DIAGNOSES  Diagnosis: Chronic HFrEF (heart failure with reduced ejection fraction)  Assessment and Plan of Treatment: Continue to follow up with your cardiologist.

## 2024-09-18 NOTE — DISCHARGE NOTE PROVIDER - NSDCFUADDAPPT_GEN_ALL_CORE_FT
APPTS ARE READY TO BE MADE: [ ] YES    Best Family or Patient Contact (if needed):    Additional Information about above appointments (if needed):    1: Infectious Disease for IV antbiotics course  2:   3:     Other comments or requests:    APPTS ARE READY TO BE MADE: [ ] YES    Best Family or Patient Contact (if needed):    Additional Information about above appointments (if needed):    1: Infectious Disease for IV antbiotics course  2:   3:     Other comments or requests:   follow up with your PCP and specialists as scheduled, go to ed in case of new or worsening symptoms

## 2024-09-18 NOTE — DISCHARGE NOTE PROVIDER - HOSPITAL COURSE
HPI:  89-year-old female, past medical history hypertension, DVT/PE, heart failure, AICD in place, RA, end-stage renal disease on hemodialysis gets dialysis Tuesday Thursday Saturday presents emergency department from dialysis for acute shortness of breath.  Patient reports received 30 minutes of dialysis and started to feel acutely short of breath.  In ER vital signs significant for tachycardia and fever.  Patient reports breathing feels better at this time.  Patient reports still makes small amount of urine.  Notes that has been burning her over the last several days.. Denies chest pain, abdominal pain, nausea vomiting diarrhea, hematuria, back pain, headache, neck pain.    ED vitals: BP: 176/106, , Temp 101.2, RR 18 , labs showed troponin 89>91, BNP>70K, Na 131, WBC 11K, /ALP >200  s.p aztreonam in ED     Patient was then put on vancomycin for during hemodialysis. Echo showed lowe ejection fraction (28%) but no vegetations. Patient to be discharged on vancomycin to end 9/26.    HPI:  89-year-old female, past medical history hypertension, DVT/PE, heart failure, AICD in place, RA, end-stage renal disease on hemodialysis gets dialysis Tuesday Thursday Saturday presents emergency department from dialysis for acute shortness of breath.  Patient reports received 30 minutes of dialysis and started to feel acutely short of breath.  In ER vital signs significant for tachycardia and fever.  Patient reports breathing feels better at this time.  Patient reports still makes small amount of urine.  Notes that has been burning her over the last several days.. Denies chest pain, abdominal pain, nausea vomiting diarrhea, hematuria, back pain, headache, neck pain.    ED vitals: BP: 176/106, , Temp 101.2, RR 18 , labs showed troponin 89>91, BNP>70K, Na 131, WBC 11K, /ALP >200  s.p aztreonam in ED     Patient was then put on vancomycin for during hemodialysis. Echo showed low ejection fraction (28%) but no vegetations. Patient to be discharged on vancomycin to end 9/26.    HPI:  89-year-old female, past medical history hypertension, DVT/PE, heart failure, AICD in place, RA, end-stage renal disease on hemodialysis gets dialysis Tuesday Thursday Saturday presents emergency department from dialysis for acute shortness of breath. Patient reports received 30 minutes of dialysis and started to feel acutely short of breath. In ER vital signs significant for tachycardia and fever. Patient reports breathing feels better at this time. Patient reports still makes small amount of urine. Notes that has been burning her over the last several days. Denies chest pain, abdominal pain, nausea vomiting diarrhea, hematuria, back pain, headache, neck pain.    ED vitals: BP: 176/106, , Temp 101.2, RR 18 , labs showed troponin 89>91, BNP>70K, Na 131, WBC 11K, /ALP >200  s.p aztreonam in ED     Hospital Course:  #Sepsis, POA (Febrile 101.2 and tachycardic) improved   # Suspected UTI-  # Staph hominis bacteremia  CTAP 09/12 negative for acute pathology  Blood Cxs  09/12 - staph hominis and staph. epidermitis meth. resistant  MRSA PCR neg, repeated blood cxs neg prelim.  - ID vancomycin 1g post HD x 14 days  - new onset fever 9/20  - f/u blood cultures, NGTD  - per ID: on dc 750mg post HD to be given at dialysis via TDC x 14 days to end 9/26    #hip pain  #hx of hip fracture  - bilateral hip xrays taken  - evaluated by ortho: no intervention at this time  - tylenol and tramadol PRN for pain  - OP rheum eval    #Acute Hypoxic Respiratory Failure, resolved  CXR 09/12 showing pulmonary edema/interstitial opacities  - fluid removal via HD    #ESRD on HD TTS - HD per nephrology  - c/w HD    #HTN  #Chronic HFrEF 25-30%  #HO PE/DVT  #H/o NSVT   - Echo: (7/23) EF of 25 to 30%  - c/w Entresto   - c/w atorvastatin 20 mg qhs  - c/w metoprolol  mg qd  - c/w Eliquis 2.5 mg bid    # Macrocytic anemia - stable h/h, obtain Vit B12, folate levels    Discussion of discharge plan of care, including discharge diagnoses, medication reconciliation, and follow-ups was conducted with  ***** on ***** at *****, and discharge was approved.    HPI:  89-year-old female, past medical history hypertension, DVT/PE, heart failure, AICD in place, RA, end-stage renal disease on hemodialysis gets dialysis Tuesday Thursday Saturday presents emergency department from dialysis for acute shortness of breath. Patient reports received 30 minutes of dialysis and started to feel acutely short of breath. In ER vital signs significant for tachycardia and fever. Patient reports breathing feels better at this time. Patient reports still makes small amount of urine. Notes that has been burning her over the last several days. Denies chest pain, abdominal pain, nausea vomiting diarrhea, hematuria, back pain, headache, neck pain.    ED vitals: BP: 176/106, , Temp 101.2, RR 18 , labs showed troponin 89>91, BNP>70K, Na 131, WBC 11K, /ALP >200  s.p aztreonam in ED     Hospital Course:  #Sepsis, POA (Febrile 101.2 and tachycardic) improved   # Suspected UTI-  # Staph hominis bacteremia  CTAP 09/12 negative for acute pathology  Blood Cxs  09/12 - staph hominis and staph. epidermitis meth. resistant  MRSA PCR neg, repeated blood cxs neg prelim.  - ID vancomycin 1g post HD x 14 days  - new onset fever 9/20  - f/u blood cultures, NGTD  - per ID: on dc 750mg post HD to be given at dialysis via TDC x 14 days to end 9/26    #hip pain  #hx of hip fracture  - bilateral hip xrays taken  - evaluated by ortho: no intervention at this time  - tylenol and tramadol PRN for pain  - OP rheum eval    #Acute Hypoxic Respiratory Failure, resolved  CXR 09/12 showing pulmonary edema/interstitial opacities  - fluid removal via HD    #ESRD on HD TTS - HD per nephrology  - c/w HD    #HTN  #Chronic HFrEF 25-30%  #HO PE/DVT  #H/o NSVT   - Echo: (7/23) EF of 25 to 30%  - c/w Entresto   - c/w atorvastatin 20 mg qhs  - c/w metoprolol  mg qd  - c/w Eliquis 2.5 mg bid    # Macrocytic anemia - stable h/h, obtain Vit B12, folate levels    Discussion of discharge plan of care, including discharge diagnoses, medication reconciliation, and follow-ups was conducted with Dr. Arboleda on 9/24/24, and discharge was approved.    HPI:  89-year-old female, past medical history hypertension, DVT/PE, heart failure, AICD in place, RA, end-stage renal disease on hemodialysis gets dialysis Tuesday Thursday Saturday presents emergency department from dialysis for acute shortness of breath. Patient reports received 30 minutes of dialysis and started to feel acutely short of breath. In ER vital signs significant for tachycardia and fever. Patient reports breathing feels better at this time. Patient reports still makes small amount of urine. Notes that has been burning her over the last several days. Denies chest pain, abdominal pain, nausea vomiting diarrhea, hematuria, back pain, headache, neck pain.    ED vitals: BP: 176/106, , Temp 101.2, RR 18 , labs showed troponin 89>91, BNP>70K, Na 131, WBC 11K, /ALP >200  s.p aztreonam in ED     Hospital Course:  #Sepsis, POA (Febrile 101.2 and tachycardic) improved   # Suspected UTI-  # Staph hominis bacteremia  CTAP 09/12 negative for acute pathology  Blood Cxs  09/12 - staph hominis and staph. epidermitis meth. resistant  MRSA PCR neg, repeated blood cxs neg prelim.  - ID vancomycin 1g post HD x 14 days  - new onset fever 9/20  - f/u blood cultures, NGTD  - per ID: on dc 750mg post HD to be given at dialysis via TDC x 14 days to end 9/26    #hip pain  #hx of hip fracture  - bilateral hip xrays taken  - evaluated by ortho: no intervention at this time  - tylenol and tramadol PRN for pain  - OP rheum eval    #Acute Hypoxic Respiratory Failure, resolved  CXR 09/12 showing pulmonary edema/interstitial opacities  - fluid removal via HD    #ESRD on HD TTS - HD per nephrology  - c/w HD    #HTN  #Chronic HFrEF 25-30%  #HO PE/DVT  #H/o NSVT   - Echo: (7/23) EF of 25 to 30%  - c/w Entresto   - c/w atorvastatin 20 mg qhs  - c/w metoprolol  mg qd  - c/w Eliquis 2.5 mg bid    # Macrocytic anemia - stable h/h, obtain Vit B12, folate levels    Discussion of discharge plan of care, including discharge diagnoses, medication reconciliation, and follow-ups was conducted with Dr. Oseguera on 9/25/24, and discharge was approved.

## 2024-09-18 NOTE — PROGRESS NOTE ADULT - SUBJECTIVE AND OBJECTIVE BOX
Patient is a 89 year old female who presented for SOB during dialysis and was found to be septic on admission secondary to UTI that grew staph hominis.    Overnight/ROS: no acute issues overnight, talked ot daughter and updated.     Physical Exam:  General: NAD, ill appearing  Cardio: +S1/S2, no murmurs, rubs, or gallops, +pedal pulses  Pulm: CTA b/l, no wheezes, rales, or rhonchi  Abd: BSx4, no TTP, nondistended, no organomegaly  Neuro: AAOx4, neurovascularly intact  Skin: No rashes or lesions  MSK: no edema      Vital Signs Last 12 Hrs  Vital Signs Last 24 Hrs  T(C): 36.8 (18 Sep 2024 13:58), Max: 37 (17 Sep 2024 21:37)  T(F): 98.2 (18 Sep 2024 13:58), Max: 98.6 (17 Sep 2024 21:37)  HR: 67 (18 Sep 2024 13:58) (66 - 68)  BP: 158/82 (18 Sep 2024 13:58) (150/64 - 159/72)  BP(mean): --  RR: 18 (18 Sep 2024 13:58) (17 - 18)  SpO2: 96% (18 Sep 2024 05:53) (96% - 97%)            LABS:                 LABS:                        11.3   7.05  )-----------( 242      ( 18 Sep 2024 06:18 )             36.0     09-18    137  |  98  |  23[H]  ----------------------------<  83  3.7   |  26  |  2.7[H]    Ca    8.1[L]      18 Sep 2024 06:18  Mg     1.9     09-17    TPro  5.7[L]  /  Alb  3.0[L]  /  TBili  0.4  /  DBili  x   /  AST  25  /  ALT  13  /  AlkPhos  138[H]  09-18                    Urinalysis Basic - ( 17 Sep 2024 06:46 )    Color: x / Appearance: x / SG: x / pH: x  Gluc: 80 mg/dL / Ketone: x  / Bili: x / Urobili: x   Blood: x / Protein: x / Nitrite: x   Leuk Esterase: x / RBC: x / WBC x   Sq Epi: x / Non Sq Epi: x / Bacteria: x    .Blood None  09-15 @ 08:38   No growth at 24 hours  --  --      .Blood Blood-Peripheral  09-12 @ 17:10   No growth at 4 days  --  Blood Culture PCR      .Blood None  07-24 @ 11:41   No growth at 5 days  --  --      .Blood Blood  07-20 @ 11:31   No growth at 5 days  --  --      MEDICATIONS  (STANDING):  apixaban 2.5 milliGRAM(s) Oral two times a day  atorvastatin 20 milliGRAM(s) Oral at bedtime  aztreonam  IVPB 2000 milliGRAM(s) IV Intermittent <User Schedule>  chlorhexidine 2% Cloths 1 Application(s) Topical daily  folic acid 1 milliGRAM(s) Oral daily  metoprolol succinate  milliGRAM(s) Oral daily  sacubitril 49 mG/valsartan 51 mG 1 Tablet(s) Oral two times a day  sertraline 25 milliGRAM(s) Oral daily  timolol 0.5% Solution 1 Drop(s) Both EYES two times a day  vancomycin  IVPB 1000 milliGRAM(s) IV Intermittent <User Schedule>    MEDICATIONS  (PRN):

## 2024-09-18 NOTE — DISCHARGE NOTE PROVIDER - ATTENDING DISCHARGE PHYSICAL EXAMINATION:
patient seen at bedside. pending placement but as per CM bed available today . patient will be discharged today. patient to follow up with PCP and specialists as scheduled.  advised to go to ed in case of new or worsening symptoms.

## 2024-09-18 NOTE — PROGRESS NOTE ADULT - SUBJECTIVE AND OBJECTIVE BOX
seen and examined  24 h events noted       PAST HISTORY  --------------------------------------------------------------------------------  No significant changes to PMH, PSH, FHx, SHx, unless otherwise noted    ALLERGIES & MEDICATIONS  --------------------------------------------------------------------------------  Allergies    Keflex (Swelling)  cephalosporins (Angioedema)    Intolerances      Standing Inpatient Medications  apixaban 2.5 milliGRAM(s) Oral two times a day  atorvastatin 20 milliGRAM(s) Oral at bedtime  chlorhexidine 2% Cloths 1 Application(s) Topical daily  folic acid 1 milliGRAM(s) Oral daily  metoprolol succinate  milliGRAM(s) Oral daily  sacubitril 49 mG/valsartan 51 mG 1 Tablet(s) Oral two times a day  sertraline 25 milliGRAM(s) Oral daily  timolol 0.5% Solution 1 Drop(s) Both EYES two times a day  vancomycin  IVPB 750 milliGRAM(s) IV Intermittent <User Schedule>        VITALS/PHYSICAL EXAM  --------------------------------------------------------------------------------  T(C): 36.6 (09-18-24 @ 05:53), Max: 37 (09-17-24 @ 21:37)  HR: 66 (09-18-24 @ 05:53) (64 - 68)  BP: 150/64 (09-18-24 @ 05:53) (150/64 - 162/84)  RR: 17 (09-18-24 @ 05:53) (17 - 18)  SpO2: 96% (09-18-24 @ 05:53) (96% - 97%)  Wt(kg): --    Weight (kg): 56 (09-16-24 @ 15:32)      09-17-24 @ 07:01  -  09-18-24 @ 07:00  --------------------------------------------------------  IN: 0 mL / OUT: 1000 mL / NET: -1000 mL      Physical Exam:  	Gen: NAD,  	Pulm: CTA B/L  	CV: S1S2; no rub  	Abd:  soft, nondistended  	Vascular access:    LABS/STUDIES  --------------------------------------------------------------------------------              11.3   7.05  >-----------<  242      [09-18-24 @ 06:18]              36.0     137  |  98  |  23  ----------------------------<  83      [09-18-24 @ 06:18]  3.7   |  26  |  2.7        Ca     8.1     [09-18-24 @ 06:18]      Mg     1.9     [09-17-24 @ 06:46]    TPro  5.7  /  Alb  3.0  /  TBili  0.4  /  DBili  x   /  AST  25  /  ALT  13  /  AlkPhos  138  [09-18-24 @ 06:18]      Creatinine Trend:  SCr 2.7 [09-18 @ 06:18]  SCr 3.7 [09-17 @ 06:46]  SCr 3.5 [09-16 @ 08:18]  SCr 3.4 [09-15 @ 20:00]  SCr 2.9 [09-15 @ 08:38]    Urinalysis - [09-18-24 @ 06:18]      Color  / Appearance  / SG  / pH       Gluc 83 / Ketone   / Bili  / Urobili        Blood  / Protein  / Leuk Est  / Nitrite       RBC  / WBC  / Hyaline  / Gran  / Sq Epi  / Non Sq Epi  / Bacteria     Urine Sodium 39.0      [09-15-24 @ 10:28]  Urine Potassium 22      [09-15-24 @ 10:28]  Urine Osmolality 210      [09-15-24 @ 10:28]    Iron 33, TIBC 147, %sat 22      [04-04-24 @ 06:44]  Ferritin 785      [04-04-24 @ 06:44]  PTH -- (Ca 8.2)      [04-09-24 @ 05:43]   35  PTH -- (Ca 8.2)      [03-24-24 @ 20:00]   138  Vitamin D (25OH) 11      [04-09-24 @ 05:43]  Lipid: chol 144, , HDL 39, LDL --      [03-22-24 @ 06:14]

## 2024-09-18 NOTE — PROGRESS NOTE ADULT - ASSESSMENT
89-year-old female, past medical history hypertension, DVT/PE, heart failure, AICD in place, RA, end-stage renal disease on hemodialysis gets dialysis Tuesday Thursday Saturday presents emergency department from dialysis for acute shortness of breath.  Patient reports received 30 minutes of dialysis and started to feel acutely short of breath.  In ER vital signs significant for tachycardia and fever.  Patient reports breathing feels better at this time.  Patient reports still makes small amount of urine.  Notes that has been burning her over the last several days.    Impression  #sepsis secondary to staph hominis UTI  - patient not urinating, straight cathed  - urinalysis and culture pending  - per ID:  Vanc dosing AUC/YANY per clinical pharmacist - change to 750mg post HD to be given at dialysis via TDC x 14 days end 9/26   - repeat bcx negative  follow nephrology     #Acute hypoxemic hypercapnic respiratory failure secondary to most likely HFrEF exacerbation, pulmonary edema   - SIRS on admission, (, temp 101.2)  - BNP 66215  - CXR: bilateral pleural effusions  - CTAP:  Bilateral atelectasis and pleural effusions and cardiomegaly  - echo from July 2024: 30% EF with grade 2 DD  - trop 89 then 91 but patient is ESRD, will not trend  sodium better   - off nasal cannula now  - strict I&O    #HTN/ HFrEF   -c/w Entresto   -c/w atorvastatin 20 mg qhs  -c/w metoprolol  mg qd  -c/w Eliquis 2.5 mg bid  -Not in overload at this time      follow up: pending placement. as per CM bed not available today.  Patient is an 89-year-old female, past medical history hypertension, DVT/PE, heart failure, AICD in place, RA, end-stage renal disease on hemodialysis gets dialysis Tuesday Thursday Saturday presents emergency department from dialysis for acute shortness of breath, was dx. with sepsis due to staph hominis bacteremia.     #Sepsis, POA (Febrile 101.2 and tachycardic) improved   # Suspected UTI-  # Staph hominis bacteremia    CTAP 09/12 negative for acute pathology  Blood Cxs  09/12 - staph hominis and staph. epidermitis meth. resistant  MRSA PCR neg, repeated blood cxs neg prelim.  - ID  vancomycin 1g post HD x 14 days       #Acute Hypoxic Respiratory Failure, resolved  CXR 09/12 showing pulmonary edema/interstitial opacities  - fluid removal via HD    #ESRD on HD TTS - HD per nephrology    #HTN  #Chronic HFrEF 25-30%  #HO PE/DVT  #H/o NSVT   Echo: (7/23)  1. Normal left ventricular internal cavity size.   2. Severely decreased global left ventricular systolic function.   3. Left ventricular ejection fraction, by visual estimation, is 25 to 30%.  -c/w Entresto   -c/w atorvastatin 20 mg qhs  -c/w metoprolol  mg qd  -c/w Eliquis 2.5 mg bid    # Macrocytic anemia - stable h/h, obtain Vit B12, folate levels    DVT PPX, Eliquis    Code status: DNR/DNI    follow up:   - Vanc dosing AUC/YANY per clinical pharmacist - change to 750mg post HD to be given at dialysis via TDC x 14 days end 9/26   pending placement.   ID cleared patient. will be discharged when bed available follow up CM  i called family and updated. sh was very appreciative of call.

## 2024-09-18 NOTE — PROGRESS NOTE ADULT - ASSESSMENT
89-year-old female, past medical history hypertension, DVT/PE, heart failure, AICD in place, RA, end-stage renal disease on hemodialysis gets dialysis Tuesday Thursday Saturday admitted for acute hypoxemic respiratory failure. Nephrology consulted regarding HD  # ESRD on HD.   - Has right tunneled catheter. hd in am standard bath uf 1 liter as tolerated   - BC / staph epi / noted  ID f/up / follow trough  patient has TDC / repeat BC 9/15 negative  / follow vanco level   -last Phos at target, not on binders   - h/h noted / no need for ELIJAH   - bp conrolled   will follow

## 2024-09-19 LAB
GLUCOSE BLDC GLUCOMTR-MCNC: 136 MG/DL — HIGH (ref 70–99)
GLUCOSE BLDC GLUCOMTR-MCNC: 93 MG/DL — SIGNIFICANT CHANGE UP (ref 70–99)
VANCOMYCIN FLD-MCNC: 14.1 UG/ML — HIGH (ref 5–10)

## 2024-09-19 PROCEDURE — 73502 X-RAY EXAM HIP UNI 2-3 VIEWS: CPT | Mod: 26,RT,76

## 2024-09-19 PROCEDURE — 99232 SBSQ HOSP IP/OBS MODERATE 35: CPT

## 2024-09-19 RX ORDER — SACUBITRIL AND VALSARTAN 97; 103 MG/1; MG/1
1 TABLET, FILM COATED ORAL
Qty: 0 | Refills: 0 | DISCHARGE
Start: 2024-09-19

## 2024-09-19 RX ORDER — ACETAMINOPHEN 325 MG
650 TABLET ORAL EVERY 6 HOURS
Refills: 0 | Status: DISCONTINUED | OUTPATIENT
Start: 2024-09-19 | End: 2024-09-25

## 2024-09-19 RX ORDER — ATORVASTATIN CALCIUM 10 MG/1
1 TABLET, FILM COATED ORAL
Qty: 0 | Refills: 0 | DISCHARGE
Start: 2024-09-19

## 2024-09-19 RX ORDER — VANCOMYCIN HCL-SODIUM CHLORIDE IV SOLN 1.5 GM/250ML-0.9% 1.5-0.9/25 GM/ML-%
750 SOLUTION INTRAVENOUS
Qty: 0 | Refills: 0 | DISCHARGE
Start: 2024-09-19

## 2024-09-19 RX ORDER — ACETAMINOPHEN 325 MG
325 TABLET ORAL EVERY 4 HOURS
Refills: 0 | Status: DISCONTINUED | OUTPATIENT
Start: 2024-09-19 | End: 2024-09-19

## 2024-09-19 RX ORDER — APIXABAN 5 MG/1
1 TABLET, FILM COATED ORAL
Qty: 0 | Refills: 0 | DISCHARGE
Start: 2024-09-19

## 2024-09-19 RX ORDER — VANCOMYCIN HCL-SODIUM CHLORIDE IV SOLN 1.5 GM/250ML-0.9% 1.5-0.9/25 GM/ML-%
750 SOLUTION INTRAVENOUS
Refills: 0 | Status: DISCONTINUED | OUTPATIENT
Start: 2024-09-21 | End: 2024-09-25

## 2024-09-19 RX ORDER — METOPROLOL TARTRATE 50 MG
1 TABLET ORAL
Qty: 0 | Refills: 0 | DISCHARGE
Start: 2024-09-19

## 2024-09-19 RX ORDER — SERTRALINE HYDROCHLORIDE 100 MG/1
1 TABLET, FILM COATED ORAL
Qty: 0 | Refills: 0 | DISCHARGE
Start: 2024-09-19

## 2024-09-19 RX ADMIN — Medication 650 MILLIGRAM(S): at 22:50

## 2024-09-19 RX ADMIN — APIXABAN 2.5 MILLIGRAM(S): 5 TABLET, FILM COATED ORAL at 18:17

## 2024-09-19 RX ADMIN — CHLORHEXIDINE GLUCONATE ORAL RINSE 1 APPLICATION(S): 1.2 SOLUTION DENTAL at 13:10

## 2024-09-19 RX ADMIN — Medication 100 MILLIGRAM(S): at 05:07

## 2024-09-19 RX ADMIN — SACUBITRIL AND VALSARTAN 1 TABLET(S): 97; 103 TABLET, FILM COATED ORAL at 18:17

## 2024-09-19 RX ADMIN — Medication 1 DROP(S): at 18:18

## 2024-09-19 RX ADMIN — ATORVASTATIN CALCIUM 20 MILLIGRAM(S): 10 TABLET, FILM COATED ORAL at 22:20

## 2024-09-19 RX ADMIN — APIXABAN 2.5 MILLIGRAM(S): 5 TABLET, FILM COATED ORAL at 05:07

## 2024-09-19 RX ADMIN — VANCOMYCIN HCL-SODIUM CHLORIDE IV SOLN 1.5 GM/250ML-0.9% 250 MILLIGRAM(S): 1.5-0.9/25 SOLUTION at 11:15

## 2024-09-19 RX ADMIN — Medication 650 MILLIGRAM(S): at 22:19

## 2024-09-19 RX ADMIN — FOLIC ACID 1 MILLIGRAM(S): 1 TABLET ORAL at 13:10

## 2024-09-19 RX ADMIN — SACUBITRIL AND VALSARTAN 1 TABLET(S): 97; 103 TABLET, FILM COATED ORAL at 05:07

## 2024-09-19 RX ADMIN — Medication 1 DROP(S): at 05:07

## 2024-09-19 RX ADMIN — SERTRALINE HYDROCHLORIDE 25 MILLIGRAM(S): 100 TABLET, FILM COATED ORAL at 13:10

## 2024-09-19 NOTE — PHARMACOTHERAPY INTERVENTION NOTE - COMMENTS
Patient ordered vancomycin 750mg IV post-HD on Tu/Th/Sat for treatment of possible Staph hominis bacteremia. Pre-HD vancomycin level on 9/19 was 14.1 mg/L. Recommended continuing vancomycin 750mg IV post-HD on Tu/Th/Sat, and obtaining a repeat pre-HD vancomycin level on 9/24 with AM labs to reassess.

## 2024-09-19 NOTE — PROGRESS NOTE ADULT - SUBJECTIVE AND OBJECTIVE BOX
SUBJECTIVE/OVERNIGHT EVENTS  Today is hospital day 7d. This morning patient was seen and examined at bedside, resting comfortably in bed. No acute or major events overnight.    HOSPITAL COURSE  Day 1:   Day 2:   Day 3:     CODE STATUS:    FAMILY COMMUNICATION  Contact date:  Name of person contacted:  Relationship to patient:  Communication details:      VITALS  T(F): 98.5 (09-19-24 @ 05:55), Max: 98.5 (09-19-24 @ 05:55)  HR: 78 (09-19-24 @ 11:40) (66 - 78)  BP: 189/89 (09-19-24 @ 11:40) (158/82 - 189/89)  RR: 18 (09-19-24 @ 05:55) (18 - 18)  SpO2: 97% (09-18-24 @ 18:52) (97% - 97%)  POCT Blood Glucose.: 100 mg/dL (09-18-24 @ 16:34)      PHYSICAL EXAM:  GENERAL: NAD, lying in bed comfortably  HEAD:  Atraumatic, Normocephalic  EYES: EOMI, PERRLA, conjunctiva and sclera clear  ENT: Moist mucous membranes  NECK: Supple, No JVD  CHEST/LUNG: Clear to auscultation bilaterally; No rales, rhonchi, wheezing, or rubs. Unlabored respirations  HEART: Regular rate and rhythm; No murmurs, rubs, or gallops  ABDOMEN: Bowel sounds present; Soft, Nontender, Nondistended. No hepatomegaly  EXTREMITIES:  2+ Peripheral Pulses, brisk capillary refill. No clubbing, cyanosis, or edema  NERVOUS SYSTEM:  Alert & Oriented X3, speech clear. No deficits   MSK: FROM all 4 extremities, full and equal strength  SKIN: No rashes or lesions    LABS             11.3   7.05  )-----------( 242      ( 09-18-24 @ 06:18 )             36.0     137  |  98  |  23  -------------------------<  83   09-18-24 @ 06:18  3.7  |  26  |  2.7    Ca      8.1     09-18-24 @ 06:18    TPro  5.7  /  Alb  3.0  /  TBili  0.4  /  DBili  x   /  AST  25  /  ALT  13  /  AlkPhos  138  /  GGT  x     09-18-24 @ 06:18        Urinalysis Basic - ( 18 Sep 2024 06:18 )    Color: x / Appearance: x / SG: x / pH: x  Gluc: 83 mg/dL / Ketone: x  / Bili: x / Urobili: x   Blood: x / Protein: x / Nitrite: x   Leuk Esterase: x / RBC: x / WBC x   Sq Epi: x / Non Sq Epi: x / Bacteria: x          IMAGING    MEDICATIONS  STANDING MEDICATIONS  apixaban 2.5 milliGRAM(s) Oral two times a day  atorvastatin 20 milliGRAM(s) Oral at bedtime  chlorhexidine 2% Cloths 1 Application(s) Topical daily  folic acid 1 milliGRAM(s) Oral daily  metoprolol succinate  milliGRAM(s) Oral daily  sacubitril 49 mG/valsartan 51 mG 1 Tablet(s) Oral two times a day  sertraline 25 milliGRAM(s) Oral daily  timolol 0.5% Solution 1 Drop(s) Both EYES two times a day  vancomycin  IVPB 750 milliGRAM(s) IV Intermittent <User Schedule>    PRN MEDICATIONS

## 2024-09-19 NOTE — PROGRESS NOTE ADULT - ATTENDING COMMENTS
Patient is an 89-year-old female, past medical history hypertension, DVT/PE, heart failure, AICD in place, RA, end-stage renal disease on hemodialysis gets dialysis Tuesday Thursday Saturday presents emergency department from dialysis for acute shortness of breath, was dx. with sepsis due to staph hominis bacteremia.     #Sepsis, POA (Febrile 101.2 and tachycardic) improved   # Suspected UTI-  # Staph hominis bacteremia  CTAP 09/12 negative for acute pathology  Blood Cxs  09/12 - staph hominis and staph. epidermitis meth. resistant  MRSA PCR neg, repeated blood cxs neg prelim.  - ID  vancomycin 1g post HD x 14 days       #Acute Hypoxic Respiratory Failure, resolved  CXR 09/12 showing pulmonary edema/interstitial opacities  - fluid removal via HD    #h/o femur fracture   Left femoral long gamma nail fixation   of the intertrochanteric fracture  patient complaing of hip pain  follow xray right and left hip  follow orthopedics     #ESRD on HD TTS - HD per nephrology    #HTN  #Chronic HFrEF 25-30%  #HO PE/DVT  #H/o NSVT   Echo: (7/23)  1. Normal left ventricular internal cavity size.   2. Severely decreased global left ventricular systolic function.   3. Left ventricular ejection fraction, by visual estimation, is 25 to 30%.  -c/w Entresto   -c/w atorvastatin 20 mg qhs  -c/w metoprolol  mg qd  -c/w Eliquis 2.5 mg bid    # Macrocytic anemia - stable h/h, obtain Vit B12, folate levels    DVT PPX, Eliquis    Code status: DNR/DNI    follow up:   - Vanc dosing AUC/YANY per clinical pharmacist - change to 750mg post HD to be given at dialysis via TDC x 14 days end 9/26   pending placement.   ID cleared patient. will be discharged when bed available follow up CM  8/18 I called family and updated. she was very appreciative of call.   follow orthopedics , follow xray hip, patient complaining of left/right hip pain (prior h/o femur fracture)

## 2024-09-19 NOTE — PROGRESS NOTE ADULT - ASSESSMENT
89-year-old female, past medical history hypertension, DVT/PE, heart failure, AICD in place, RA, end-stage renal disease on hemodialysis gets dialysis Tuesday Thursday Saturday presents emergency department from dialysis for acute shortness of breath.  Patient reports received 30 minutes of dialysis and started to feel acutely short of breath.  In ER vital signs significant for tachycardia and fever.  Patient reports breathing feels better at this time.  Patient reports still makes small amount of urine.  Notes that has been burning her over the last several days.    Impression    #sepsis secondary to staph hominis UTI  - patient not urinating, straight cathed  - urinalysis and culture pending  - per ID: continue IV vancomycin 1g post HD with AUC/YANY dosing  - repeat bcx negative  - TTE negative for vegetation    #left hip pain  - left hip xray performed, will await upload of imaging and read  - pain medication: starting tylenol, will reassess    #Acute hypoxemic hypercapnic respiratory failure secondary to most likely HFrEF exacerbation, pulmonary edema   - SIRS on admission, (, temp 101.2)  - BNP 14404  - CXR: bilateral pleural effusions  - CTAP:  Bilateral atelectasis and pleural effusions and cardiomegaly  - echo from July 2024: 30% EF with grade 2 DD  - trop 89 then 91 but patient is ESRD, will not trend  - Na 131, follow BMP  - off nasal cannula now  - strict I&O    #HTN/ HFrEF   -c/w Entresto   -c/w atorvastatin 20 mg qhs  -c/w metoprolol  mg qd  -c/w Eliquis 2.5 mg bid  -Not in overload at this time      # Misc  - DVT Prophylaxis: apixaban  - GI Prophylaxis: - Diet: - Activity: Activity - Ambulate as Tolerated  - Code Status: Full Do Not Resuscitate  - Dispo: discharge to LTC

## 2024-09-19 NOTE — PROGRESS NOTE ADULT - ASSESSMENT
89-year-old female, past medical history hypertension, DVT/PE, heart failure, AICD in place, RA, end-stage renal disease on hemodialysis gets dialysis Tuesday Thursday Saturday admitted for acute hypoxemic respiratory failure. Nephrology consulted regarding HD  # ESRD on HD.   - Has right tunneled catheter. HD today standard bath uf 1 liter as tolerated   - BC / staph epi / noted  ID f/up / on  vanco / follow trough  patient has TDC / repeat BC 9/15 negative  / follow vanco level   -last Phos at target, not on binders   - h/h noted / no need for ELIJAH   - bp conrolled   will follow

## 2024-09-19 NOTE — PHARMACOTHERAPY INTERVENTION NOTE - INTERVENTION TYPE RECOOMEND
Pharmacokinetics Evaluation

## 2024-09-19 NOTE — PROGRESS NOTE ADULT - SUBJECTIVE AND OBJECTIVE BOX
Nephrology progress note    Patient is seen and examined, events over the last 24 h noted .  Lying in bed   complained of generalized body aches     Allergies:  Keflex (Swelling)  cephalosporins (Angioedema)    Hospital Medications:   MEDICATIONS  (STANDING):  apixaban 2.5 milliGRAM(s) Oral two times a day  atorvastatin 20 milliGRAM(s) Oral at bedtime  folic acid 1 milliGRAM(s) Oral daily  metoprolol succinate  milliGRAM(s) Oral daily  sacubitril 49 mG/valsartan 51 mG 1 Tablet(s) Oral two times a day  sertraline 25 milliGRAM(s) Oral daily  timolol 0.5% Solution 1 Drop(s) Both EYES two times a day  vancomycin  IVPB 750 milliGRAM(s) IV Intermittent <User Schedule>        VITALS:  T(F): 98.5 (09-19-24 @ 05:55), Max: 98.5 (09-19-24 @ 05:55)  HR: 71 (09-19-24 @ 05:55)  BP: 166/90 (09-19-24 @ 05:55)  RR: 18 (09-19-24 @ 05:55)  SpO2: 97% (09-18-24 @ 18:52)      09-17 @ 07:01  -  09-18 @ 07:00  --------------------------------------------------------  IN: 0 mL / OUT: 1000 mL / NET: -1000 mL          PHYSICAL EXAM:  Constitutional: NAD  Respiratory: CTAB,  Cardiovascular: S1, S2, RRR  Gastrointestinal: BS+, soft, NT/ND  Extremities: No cyanosis or clubbing. No peripheral edema  :  No carlos.   Skin: No rashes    LABS:  09-18    137  |  98  |  23[H]  ----------------------------<  83  3.7   |  26  |  2.7[H]    Ca    8.1[L]      18 Sep 2024 06:18    TPro  5.7[L]  /  Alb  3.0[L]  /  TBili  0.4  /  DBili      /  AST  25  /  ALT  13  /  AlkPhos  138[H]  09-18                          11.3   7.05  )-----------( 242      ( 18 Sep 2024 06:18 )             36.0       Urine Studies:  Urinalysis Basic - ( 18 Sep 2024 06:18 )    Color:  / Appearance:  / SG:  / pH:   Gluc: 83 mg/dL / Ketone:   / Bili:  / Urobili:    Blood:  / Protein:  / Nitrite:    Leuk Esterase:  / RBC:  / WBC    Sq Epi:  / Non Sq Epi:  / Bacteria:       Osmolality, Random Urine: 210 mos/kg (09-15 @ 10:28)  Potassium, Random Urine: 22 mmol/L (09-15 @ 10:28)  Sodium, Random Urine: 39.0 mmoL/L (09-15 @ 10:28)      Iron 33, TIBC 147, %sat 22      [04-04-24 @ 06:44]  Ferritin 785      [04-04-24 @ 06:44]  PTH -- (Ca 8.2)      [04-09-24 @ 05:43]   35  PTH -- (Ca 8.2)      [03-24-24 @ 20:00]   138  Vitamin D (25OH) 11      [04-09-24 @ 05:43]  Lipid: chol 144, , HDL 39, LDL --      [03-22-24 @ 06:14]    HBsAb Nonreact      [03-27-24 @ 15:55]  HBsAg Nonreact      [04-01-24 @ 15:52]  HBcAb Nonreact      [04-03-24 @ 16:02]  HCV 0.20, Nonreact      [04-01-24 @ 15:52]    OSIEL: titer 1:160, pattern [id number]      [04-27-22 @ 16:00]  Rheumatoid Factor 22      [07-25-24 @ 05:46]  Free Light Chains: kappa 13.15, lambda 10.99, ratio = 1.20      [12-21 @ 07:39]  Immunofixation Serum:   No Monoclonal Band Identified    Reference Range: None Detected      [12-20-22 @ 10:57]  SPEP Interpretation: Normal Electrophoresis Pattern      [12-20-22 @ 10:57]  Immunofixation Urine: Reference Range: None Detected      [12-25-20 @ 12:35]  UPEP Interpretation: Mild Selective (Glomerular) Proteinuria      [12-25-20 @ 12:35]      RADIOLOGY & ADDITIONAL STUDIES:

## 2024-09-19 NOTE — CONSULT NOTE ADULT - CONSULT REASON
89 year old woman with ESRD on HD TTS presented for acute hypoxemic respiratory failure. Nephrology consulted regarding HD
fever
L Hip Pain

## 2024-09-19 NOTE — CONSULT NOTE ADULT - SUBJECTIVE AND OBJECTIVE BOX
NEPHROLOGY CONSULTATION NOTE    89-year-old female, past medical history hypertension, DVT/PE, heart failure, AICD in place, RA, end-stage renal disease on hemodialysis gets dialysis Tuesday Thursday Saturday admitted for acute hypoxemic respiratory failure. Nephrology consulted regarding HD    PAST MEDICAL & SURGICAL HISTORY:  Hypertension      Gout      Diverticulitis  sigmoid      Rheumatoid arthritis      DVT, lower extremity  Bilateral      Pulmonary embolism      Chronic HFrEF (heart failure with reduced ejection fraction)      AICD (automatic cardioverter/defibrillator) present      ESRD on dialysis      Artificial pacemaker      History of appendectomy      History of tonsillectomy      History of hysterectomy      H/O hernia repair        Allergies:  Keflex (Swelling)  cephalosporins (Angioedema)    Home Medications Reviewed  Hospital Medications:   MEDICATIONS  (STANDING):  apixaban 2.5 milliGRAM(s) Oral two times a day  atorvastatin 20 milliGRAM(s) Oral at bedtime  aztreonam  IVPB 2000 milliGRAM(s) IV Intermittent <User Schedule>  folic acid 1 milliGRAM(s) Oral daily  metoprolol succinate  milliGRAM(s) Oral daily  sacubitril 49 mG/valsartan 51 mG 1 Tablet(s) Oral two times a day  sertraline 25 milliGRAM(s) Oral daily  timolol 0.5% Solution 1 Drop(s) Both EYES two times a day    SOCIAL HISTORY:  Denies ETOH,Smoking,   FAMILY HISTORY:  FH: arthritis  sister      DNI      REVIEW OF SYSTEMS:  CONSTITUTIONAL: No weakness, fevers or chills  EYES/ENT: No visual changes;  No vertigo or throat pain   NECK: No pain or stiffness  RESPIRATORY: No cough, wheezing, hemoptysis; No shortness of breath  CARDIOVASCULAR: No chest pain or palpitations.  GASTROINTESTINAL: No abdominal or epigastric pain. No nausea, vomiting, or hematemesis; No diarrhea or constipation. No melena or hematochezia.  GENITOURINARY: No dysuria, frequency, foamy urine, urinary urgency, incontinence or hematuria  NEUROLOGICAL: No numbness or weakness  SKIN: No itching, burning, rashes, or lesions   VASCULAR: No bilateral lower extremity edema.   All other review of systems is negative unless indicated above.    VITALS:  Vital Signs Last 24 Hrs  T(C): 36.6 (13 Sep 2024 11:46), Max: 38.7 (12 Sep 2024 15:37)  T(F): 97.9 (13 Sep 2024 11:46), Max: 101.7 (12 Sep 2024 15:37)  HR: 72 (13 Sep 2024 11:46) (67 - 108)  BP: 130/69 (13 Sep 2024 11:46) (130/69 - 176/106)  BP(mean): 97 (13 Sep 2024 05:13) (97 - 146)  RR: 18 (13 Sep 2024 11:46) (18 - 18)  SpO2: 100% (12 Sep 2024 23:50) (95% - 100%)    Parameters below as of 12 Sep 2024 23:50  Patient On (Oxygen Delivery Method): nasal cannula  O2 Flow (L/min): 3      Height (cm): 165.1 (09-12 @ 15:37)  PHYSICAL EXAM:  Constitutional: NAD  HEENT: anicteric sclera, oropharynx clear, MMM  Neck: No JVD  Respiratory: CTAB, no wheezes, rales or rhonchi  Cardiovascular: S1, S2, RRR  Gastrointestinal: BS+, soft, NT/ND  Extremities: No cyanosis or clubbing. No peripheral edema  Neurological: A/O x 3, no focal deficits  Psychiatric: Normal mood, normal affect  : No CVA tenderness. No carlos.   Skin: No rashes  Vascular Access:    LABS:  09-13    135  |  95<L>  |  31<H>  ----------------------------<  85  4.8   |  29  |  4.0<H>    Ca    8.5      13 Sep 2024 07:11  Phos  3.2     09-13  Mg     2.1     09-13    TPro  6.0  /  Alb  3.3<L>  /  TBili  0.6  /  DBili      /  AST  36  /  ALT  18  /  AlkPhos  170<H>  09-13    Creatinine Trend: 4.0 <--, 3.4 <--                        11.3   8.80  )-----------( 211      ( 13 Sep 2024 07:11 )             37.2     Urine Studies:  Urinalysis Basic - ( 13 Sep 2024 07:11 )    Color:  / Appearance:  / SG:  / pH:   Gluc: 85 mg/dL / Ketone:   / Bili:  / Urobili:    Blood:  / Protein:  / Nitrite:    Leuk Esterase:  / RBC:  / WBC    Sq Epi:  / Non Sq Epi:  / Bacteria:                 RADIOLOGY & ADDITIONAL STUDIES:                
ORTHOPAEDIC SURGERY CONSULT NOTE    Reason for Consult: L Hip Pain    HPI: [id number] presents with pain in pain in bilateral hips. Denies any acute trauma or falls. Denies pain elsewhere. Denies paresthesias. Reports that she has not been ambulatory since her last surgery on her left hip. Denies any fever or chills.     PAST MEDICAL & SURGICAL HISTORY:  Hypertension  Gout  Diverticulitis (sigmoid)  Rheumatoid arthritis  DVT, lower extremity (Bilateral)  Pulmonary embolism  Chronic HFrEF (heart failure with reduced ejection fraction)  AICD (automatic cardioverter/defibrillator) present  ESRD on dialysis  Artificial pacemaker  History of appendectomy  History of tonsillectomy  History of hysterectomy  H/O hernia repair        Allergies: Keflex (Swelling)  cephalosporins (Angioedema)    Medications: acetaminophen     Tablet .. 325 milliGRAM(s) Oral every 4 hours PRN  apixaban 2.5 milliGRAM(s) Oral two times a day  atorvastatin 20 milliGRAM(s) Oral at bedtime  chlorhexidine 2% Cloths 1 Application(s) Topical daily  folic acid 1 milliGRAM(s) Oral daily  metoprolol succinate  milliGRAM(s) Oral daily  sacubitril 49 mG/valsartan 51 mG 1 Tablet(s) Oral two times a day  sertraline 25 milliGRAM(s) Oral daily  timolol 0.5% Solution 1 Drop(s) Both EYES two times a day      PHYSICAL EXAM:  Vital Signs Last 24 Hrs  T(C): 36.7 (19 Sep 2024 12:25), Max: 36.9 (19 Sep 2024 05:55)  T(F): 98 (19 Sep 2024 12:25), Max: 98.5 (19 Sep 2024 05:55)  HR: 71 (19 Sep 2024 12:25) (66 - 78)  BP: 183/92 (19 Sep 2024 12:25) (160/88 - 189/89)  BP(mean): --  RR: 18 (19 Sep 2024 12:25) (18 - 18)  SpO2: 94% (19 Sep 2024 12:25) (94% - 97%)        Physical Exam:  Alert, NAD  Resp: NLB on RA.    PELVIS: No open skin or wounds. Pelvis stable to AP and Lat compression.       RLE:  No open skin or wounds  NTTP knee, leg, ankle or foot  TTP at the right hip and anterior thigh  Full baseline painless ROM at knee, ankle and toes   Able to tolerate ranging the hip passively   No pain with short arc motion of the hip  (+) pain with log-roll/axial compression  Unable to actively SLR  SILT DP/SP/T/Fair/Sa  EHL/FHL/TA/Gs motor intact  2+ DP/PT pulses with brisk cap refill distally.      LLE:   No open skin or wounds  NTTP  hip, knee, leg, ankle or foot.   Full baseline painless ROM at hip, knee, ankle and toes   No pain with log-roll or axial compression  No pain with short arc motion  SILT DP/SP/T/Fair/Sa.   EHL/FHL/TA/Gs motor intact.  2+ DP/PT pulses with brisk cap refill distally.      Labs:                        11.3   7.05  )-----------( 242      ( 18 Sep 2024 06:18 )             36.0     09-18    137  |  98  |  23[H]  ----------------------------<  83  3.7   |  26  |  2.7[H]    Ca    8.1[L]      18 Sep 2024 06:18  TPro  5.7[L]  /  Alb  3.0[L]  /  TBili  0.4  /  DBili  x   /  AST  25  /  ALT  13  /  AlkPhos  138[H]  09-18        Imaging:  XR Pelvis/B/L Hips: No acute fractures. Intramedullary nail in the left hip in stable alignment with no evidence of hardware loosening.         A/P: 89y Female with chronic bilateral hip pain. No concern for septic arthritis. No acute orthopedic intervention.     - WBAT B/L Hips  - Pain control  - Extremity icing/elevation  - Patient instructed to return to ED if any worsening pain, numbness, tingling, fevers, chills or any other concerning symptoms  - F/U with Dr. Mchugh in 2 weeks. Please call [phone]
RACHEL SUMMERS  89y, Female  Allergy: Keflex (Swelling)  cephalosporins (Angioedema)      CHIEF COMPLAINT:   chest pain/sob (13 Sep 2024 12:24)      LOS  1d    HPI  HPI:  89-year-old female, past medical history hypertension, DVT/PE, heart failure, AICD in place, RA, end-stage renal disease on hemodialysis gets dialysis Tuesday Thursday Saturday presents emergency department from dialysis for acute shortness of breath.  Patient reports received 30 minutes of dialysis and started to feel acutely short of breath.  In ER vital signs significant for tachycardia and fever.  Patient reports breathing feels better at this time.  Patient reports still makes small amount of urine.  Notes that has been burning her over the last several days.. Denies chest pain, abdominal pain, nausea vomiting diarrhea, hematuria, back pain, headache, neck pain.    ED vitals: BP: 176/106, , Temp 101.2, RR 18 , labs showed troponin 89>91, BNP>70K, Na 131, WBC 11K, /ALP >200  s.p aztreonam in ED  (12 Sep 2024 23:47)      INFECTIOUS DISEASE HISTORY:  ID consulted for fever  Tm 101.2 P>90 Hypertensive in the ER  Admission WBC 11  Reports mild dysuria   BNP > 70k  Pt denies any localizing symptoms   CT AP unrevealing     Currently ordered for:  aztreonam  IVPB 2000 milliGRAM(s) IV Intermittent <User Schedule>      PMH  PAST MEDICAL & SURGICAL HISTORY:  Hypertension      Gout      Diverticulitis  sigmoid      Rheumatoid arthritis      DVT, lower extremity  Bilateral      Pulmonary embolism      Chronic HFrEF (heart failure with reduced ejection fraction)      AICD (automatic cardioverter/defibrillator) present      ESRD on dialysis      Artificial pacemaker      History of appendectomy      History of tonsillectomy      History of hysterectomy      H/O hernia repair          FAMILY HISTORY  No pertinent family history in first degree relatives    No pertinent family history in first degree relatives    FH: arthritis        SOCIAL HISTORY  Social History:  Denies tobacco, etoh or illicit drug use (26 Apr 2022 00:22)        ROS  General: Denies rigors, nightsweats  HEENT: Denies headache, rhinorrhea, sore throat, eye pain  CV: Denies CP, palpitations  PULM: Denies wheezing, hemoptysis  GI: Denies hematemesis, hematochezia, melena  : as noted above   MSK: Denies arthralgias, myalgias  SKIN: Denies rash, lesions  NEURO: Denies paresthesias, weakness  PSYCH: Denies depression, anxiety     VITALS:  T(F): 97.9, Max: 101.7 (09-12-24 @ 15:37)  HR: 72  BP: 130/69  RR: 18Vital Signs Last 24 Hrs  T(C): 36.6 (13 Sep 2024 11:46), Max: 38.7 (12 Sep 2024 15:37)  T(F): 97.9 (13 Sep 2024 11:46), Max: 101.7 (12 Sep 2024 15:37)  HR: 72 (13 Sep 2024 11:46) (67 - 108)  BP: 130/69 (13 Sep 2024 11:46) (130/69 - 176/106)  BP(mean): 97 (13 Sep 2024 05:13) (97 - 146)  RR: 18 (13 Sep 2024 11:46) (18 - 18)  SpO2: 96% (13 Sep 2024 12:47) (92% - 100%)    Parameters below as of 13 Sep 2024 12:47  Patient On (Oxygen Delivery Method): nasal cannula  O2 Flow (L/min): 2      PHYSICAL EXAM:  Gen: NAD, resting in bed chronically ill appearing Elderly F  HEENT: Normocephalic, atraumatic  Neck: supple, no lymphadenopathy  CV: Regular rate & regular rhythm  Lungs: decreased BS at bases, no fremitus  Abdomen: Soft, BS present no suprapubic tenderness, no CVAT   Ext: Warm, well perfused  Neuro: non focal, awake  Skin: no rash, no erythema  Lines: no phlebitis TDC    TESTS & MEASUREMENTS:                        11.3   8.80  )-----------( 211      ( 13 Sep 2024 07:11 )             37.2     09-13    135  |  95<L>  |  31<H>  ----------------------------<  85  4.8   |  29  |  4.0<H>    Ca    8.5      13 Sep 2024 07:11  Phos  3.2     09-13  Mg     2.1     09-13    TPro  6.0  /  Alb  3.3<L>  /  TBili  0.6  /  DBili  x   /  AST  36  /  ALT  18  /  AlkPhos  170<H>  09-13      LIVER FUNCTIONS - ( 13 Sep 2024 07:11 )  Alb: 3.3 g/dL / Pro: 6.0 g/dL / ALK PHOS: 170 U/L / ALT: 18 U/L / AST: 36 U/L / GGT: x           Urinalysis Basic - ( 13 Sep 2024 07:11 )    Color: x / Appearance: x / SG: x / pH: x  Gluc: 85 mg/dL / Ketone: x  / Bili: x / Urobili: x   Blood: x / Protein: x / Nitrite: x   Leuk Esterase: x / RBC: x / WBC x   Sq Epi: x / Non Sq Epi: x / Bacteria: x        Culture - Blood (collected 07-24-24 @ 11:41)  Source: .Blood None  Final Report (07-29-24 @ 21:00):    No growth at 5 days    Culture - Blood (collected 07-20-24 @ 11:31)  Source: .Blood Blood  Final Report (07-25-24 @ 22:00):    No growth at 5 days    Culture - Blood (collected 07-20-24 @ 11:31)  Source: .Blood Blood  Final Report (07-25-24 @ 22:00):    No growth at 5 days    Culture - Urine (collected 04-17-24 @ 16:35)  Source: Clean Catch Clean Catch (Midstream)  Final Report (04-20-24 @ 14:57):    >100,000 CFU/ml Escherichia coli  Organism: Escherichia coli (04-20-24 @ 14:57)  Organism: Escherichia coli (04-20-24 @ 14:57)      Method Type: YANY      -  Amoxicillin/Clavulanic Acid: S <=8/4      -  Ampicillin: R >16 These ampicillin results predict results for amoxicillin      -  Ampicillin/Sulbactam: I 16/8      -  Aztreonam: S <=4      -  Cefazolin: S <=2 For uncomplicated UTI with K. pneumoniae, E. coli, or P. mirablis: YANY <=16 is sensitive and YANY >=32 is resistant. This also predicts results for oral agents cefaclor, cefdinir, cefpodoxime, cefprozil, cefuroxime axetil, cephalexin and locarbef for uncomplicated UTI. Note that some isolates may be susceptible to these agents while testing resistant to cefazolin.      -  Cefepime: S <=2      -  Cefoxitin: S <=8      -  Ceftriaxone: S <=1      -  Cefuroxime: S 8      -  Ciprofloxacin: R >2      -  Ertapenem: S <=0.5      -  Gentamicin: S <=2      -  Imipenem: S <=1      -  Levofloxacin: R >4      -  Meropenem: S <=1      -  Nitrofurantoin: S <=32 Should not be used to treat pyelonephritis      -  Piperacillin/Tazobactam: S <=8      -  Tobramycin: S <=2      -  Trimethoprim/Sulfamethoxazole: S <=0.5/9.5    Culture - Blood (collected 03-27-24 @ 06:19)  Source: .Blood None  Final Report (04-01-24 @ 22:00):    No growth at 5 days    Culture - Blood (collected 03-27-24 @ 01:15)  Source: .Blood None  Final Report (04-01-24 @ 07:00):    No growth at 5 days    Culture - Urine (collected 03-22-24 @ 21:20)  Source: Clean Catch Clean Catch (Midstream)  Final Report (03-24-24 @ 07:05):    <10,000 CFU/mL Normal Urogenital Arturo    Culture - Blood (collected 03-21-24 @ 23:01)  Source: .Blood Blood  Final Report (03-27-24 @ 07:01):    No growth at 5 days    Culture - Urine (collected 03-21-24 @ 23:01)  Source: Clean Catch Clean Catch (Midstream)  Final Report (03-25-24 @ 08:23):    50,000 - 99,000 CFU/mL Escherichia coli    <10,000 CFU/ml Normal Urogenital arturo present  Organism: Escherichia coli (03-25-24 @ 08:23)  Organism: Escherichia coli (03-25-24 @ 08:23)      Method Type: YANY      -  Amoxicillin/Clavulanic Acid: S <=8/4      -  Ampicillin: S <=8 These ampicillin results predict results for amoxicillin      -  Ampicillin/Sulbactam: S <=4/2      -  Aztreonam: S <=4      -  Cefazolin: S <=2 For uncomplicated UTI with K. pneumoniae, E. coli, or P. mirablis: YANY <=16 is sensitive and YANY >=32 is resistant. This also predicts results for oral agents cefaclor, cefdinir, cefpodoxime, cefprozil, cefuroxime axetil, cephalexin and locarbef for uncomplicated UTI. Note that some isolates may be susceptible to these agents while testing resistant to cefazolin.      -  Cefepime: S <=2      -  Cefoxitin: S <=8      -  Ceftriaxone: S <=1      -  Cefuroxime: S <=4      -  Ciprofloxacin: S <=0.25      -  Ertapenem: S <=0.5      -  Gentamicin: S <=2      -  Imipenem: S <=1      -  Levofloxacin: S <=0.5      -  Meropenem: S <=1      -  Nitrofurantoin: S <=32 Should not be used to treat pyelonephritis      -  Piperacillin/Tazobactam: S <=8      -  Tobramycin: S <=2      -  Trimethoprim/Sulfamethoxazole: S <=0.5/9.5        Lactate, Blood: 1.9 mmol/L (09-12-24 @ 17:10)      INFECTIOUS DISEASES TESTING  Rapid RVP Result: Detected (07-23-24 @ 23:38)  MRSA PCR Result.: Negative (07-22-24 @ 11:50)  Streptococcus pneumoniae Ag, Ur Result: Negative (07-22-24 @ 11:36)  Legionella Antigen, Urine: Negative (07-22-24 @ 11:36)  Procalcitonin: 0.23 ng/mL (07-20-24 @ 14:32)  Rapid RVP Result: NotDetec (04-08-24 @ 00:15)  Hepatitis B Surface Antigen: Nonreact (04-01-24 @ 15:52)  Hepatitis C Virus Interpretation: Nonreact (04-01-24 @ 15:52)  Procalcitonin, Serum: 0.70 ng/mL (03-30-24 @ 09:03)  Hepatitis B Surface Antigen: Nonreact (03-28-24 @ 05:49)  Hepatitis C Virus Interpretation: Nonreact (03-28-24 @ 05:49)  Hepatitis B Surface Antigen: Nonreact (03-27-24 @ 15:55)  Rapid RVP Result: NotDetec (03-27-24 @ 12:02)  Procalcitonin, Serum: 0.13 ng/mL (03-23-24 @ 16:00)      INFLAMMATORY MARKERS  C-Reactive Protein: 76.5 mg/L (09-13-24 @ 07:11)      RADIOLOGY & ADDITIONAL TESTS:  I have personally reviewed the last Chest xray  CXR      CT  CT Abdomen and Pelvis No Cont:   ACC: [id number] EXAM:  CT ABDOMEN AND PELVIS   ORDERED BY: YAAY GRACIA     PROCEDURE DATE:  09/12/2024          INTERPRETATION:  CLINICAL INFORMATION: Pain; sepsis    COMPARISON: CT ABDOMEN/PELVIS 4/17/2024.    CONTRAST/COMPLICATIONS:  IV Contrast: None  Oral Contrast: None  Complications: None    PROCEDURE:  CT of the Abdomen and Pelvis was performed.  Sagittal and coronal reformats were performed.    FINDINGS:  LOWER CHEST: Bilateral atelectasis and pleural effusions. Cardiomegaly.   Partially visualized pacing leads, dialysis catheter and left chest wall   pacing device    LIVER: Within normal limits.  BILE DUCTS: Normal caliber.  GALLBLADDER: Cholecystectomy.  SPLEEN: Within normal limits.  PANCREAS: Within normal limits.  ADRENALS: Within normal limits.  KIDNEYS/URETERS: No renal stones or hydronephrosis.    BLADDER: Urinary bladder wall thickening which may be due to   underdistention  REPRODUCTIVE ORGANS: Hysterectomy.    BOWEL: No bowel obstruction. Colonic diverticulosis. Appendix is not   visualized. No evidence of inflammation in the pericecal region.  PERITONEUM/RETROPERITONEUM: Within normal limits.  VESSELS: Atherosclerotic changes.  LYMPH NODES: No lymphadenopathy.  ABDOMINAL WALL: Within normal limits.  BONES: Osteopenia. Degenerative changes    IMPRESSION:  No CT evidence of acute intra-abdominal pathology    --- End of Report ---            KATI JOSEPH MD; Attending Radiologist  This document has been electronically signed. Sep 12 2024  7:41PM (09-12-24 @ 19:36)      CARDIOLOGY TESTING  12 Lead ECG:   Ventricular Rate 97 BPM    Atrial Rate 97 BPM    P-R Interval 148 ms <TRUNCATED> (09-12-24 @ 15:41)       MEDICATIONS  apixaban 2.5 Oral two times a day  atorvastatin 20 Oral at bedtime  aztreonam  IVPB 2000 IV Intermittent <User Schedule>  folic acid 1 Oral daily  metoprolol succinate  Oral daily  sacubitril 49 mG/valsartan 51 mG 1 Oral two times a day  sertraline 25 Oral daily  timolol 0.5% Solution 1 Both EYES two times a day      ANTIBIOTICS:  aztreonam  IVPB 2000 milliGRAM(s) IV Intermittent <User Schedule>      ALLERGIES:  Keflex (Swelling)  cephalosporins (Angioedema)

## 2024-09-20 LAB
ALBUMIN SERPL ELPH-MCNC: 3.2 G/DL — LOW (ref 3.5–5.2)
ALP SERPL-CCNC: 140 U/L — HIGH (ref 30–115)
ALT FLD-CCNC: 17 U/L — SIGNIFICANT CHANGE UP (ref 0–41)
ANION GAP SERPL CALC-SCNC: 12 MMOL/L — SIGNIFICANT CHANGE UP (ref 7–14)
AST SERPL-CCNC: 36 U/L — SIGNIFICANT CHANGE UP (ref 0–41)
BASOPHILS # BLD AUTO: 0.04 K/UL — SIGNIFICANT CHANGE UP (ref 0–0.2)
BASOPHILS NFR BLD AUTO: 0.5 % — SIGNIFICANT CHANGE UP (ref 0–1)
BILIRUB SERPL-MCNC: 0.4 MG/DL — SIGNIFICANT CHANGE UP (ref 0.2–1.2)
BUN SERPL-MCNC: 30 MG/DL — HIGH (ref 10–20)
CALCIUM SERPL-MCNC: 8 MG/DL — LOW (ref 8.4–10.5)
CHLORIDE SERPL-SCNC: 92 MMOL/L — LOW (ref 98–110)
CO2 SERPL-SCNC: 26 MMOL/L — SIGNIFICANT CHANGE UP (ref 17–32)
CREAT SERPL-MCNC: 3.2 MG/DL — HIGH (ref 0.7–1.5)
CULTURE RESULTS: SIGNIFICANT CHANGE UP
EGFR: 13 ML/MIN/1.73M2 — LOW
EOSINOPHIL # BLD AUTO: 0.14 K/UL — SIGNIFICANT CHANGE UP (ref 0–0.7)
EOSINOPHIL NFR BLD AUTO: 1.7 % — SIGNIFICANT CHANGE UP (ref 0–8)
GLUCOSE SERPL-MCNC: 200 MG/DL — HIGH (ref 70–99)
HCT VFR BLD CALC: 35.4 % — LOW (ref 37–47)
HGB BLD-MCNC: 11.1 G/DL — LOW (ref 12–16)
IMM GRANULOCYTES NFR BLD AUTO: 0.4 % — HIGH (ref 0.1–0.3)
LYMPHOCYTES # BLD AUTO: 1.52 K/UL — SIGNIFICANT CHANGE UP (ref 1.2–3.4)
LYMPHOCYTES # BLD AUTO: 18.9 % — LOW (ref 20.5–51.1)
MAGNESIUM SERPL-MCNC: 1.8 MG/DL — SIGNIFICANT CHANGE UP (ref 1.8–2.4)
MCHC RBC-ENTMCNC: 28.7 PG — SIGNIFICANT CHANGE UP (ref 27–31)
MCHC RBC-ENTMCNC: 31.4 G/DL — LOW (ref 32–37)
MCV RBC AUTO: 91.5 FL — SIGNIFICANT CHANGE UP (ref 81–99)
MONOCYTES # BLD AUTO: 0.92 K/UL — HIGH (ref 0.1–0.6)
MONOCYTES NFR BLD AUTO: 11.4 % — HIGH (ref 1.7–9.3)
NEUTROPHILS # BLD AUTO: 5.4 K/UL — SIGNIFICANT CHANGE UP (ref 1.4–6.5)
NEUTROPHILS NFR BLD AUTO: 67.1 % — SIGNIFICANT CHANGE UP (ref 42.2–75.2)
NRBC # BLD: 0 /100 WBCS — SIGNIFICANT CHANGE UP (ref 0–0)
PLATELET # BLD AUTO: 248 K/UL — SIGNIFICANT CHANGE UP (ref 130–400)
PMV BLD: 9.9 FL — SIGNIFICANT CHANGE UP (ref 7.4–10.4)
POTASSIUM SERPL-MCNC: 3.6 MMOL/L — SIGNIFICANT CHANGE UP (ref 3.5–5)
POTASSIUM SERPL-SCNC: 3.6 MMOL/L — SIGNIFICANT CHANGE UP (ref 3.5–5)
PROT SERPL-MCNC: 5.9 G/DL — LOW (ref 6–8)
RBC # BLD: 3.87 M/UL — LOW (ref 4.2–5.4)
RBC # FLD: 16 % — HIGH (ref 11.5–14.5)
SODIUM SERPL-SCNC: 130 MMOL/L — LOW (ref 135–146)
SPECIMEN SOURCE: SIGNIFICANT CHANGE UP
WBC # BLD: 8.05 K/UL — SIGNIFICANT CHANGE UP (ref 4.8–10.8)
WBC # FLD AUTO: 8.05 K/UL — SIGNIFICANT CHANGE UP (ref 4.8–10.8)

## 2024-09-20 PROCEDURE — 99233 SBSQ HOSP IP/OBS HIGH 50: CPT

## 2024-09-20 RX ORDER — ACETAMINOPHEN 325 MG
650 TABLET ORAL EVERY 6 HOURS
Refills: 0 | Status: DISCONTINUED | OUTPATIENT
Start: 2024-09-20 | End: 2024-09-25

## 2024-09-20 RX ORDER — TRAMADOL HYDROCHLORIDE 50 MG/1
25 TABLET, COATED ORAL EVERY 6 HOURS
Refills: 0 | Status: DISCONTINUED | OUTPATIENT
Start: 2024-09-20 | End: 2024-09-23

## 2024-09-20 RX ORDER — ACETAMINOPHEN 325 MG
650 TABLET ORAL EVERY 6 HOURS
Refills: 0 | Status: DISCONTINUED | OUTPATIENT
Start: 2024-09-20 | End: 2024-09-20

## 2024-09-20 RX ADMIN — ATORVASTATIN CALCIUM 20 MILLIGRAM(S): 10 TABLET, FILM COATED ORAL at 21:19

## 2024-09-20 RX ADMIN — SACUBITRIL AND VALSARTAN 1 TABLET(S): 97; 103 TABLET, FILM COATED ORAL at 06:32

## 2024-09-20 RX ADMIN — Medication 650 MILLIGRAM(S): at 07:00

## 2024-09-20 RX ADMIN — Medication 100 MILLIGRAM(S): at 06:33

## 2024-09-20 RX ADMIN — CHLORHEXIDINE GLUCONATE ORAL RINSE 1 APPLICATION(S): 1.2 SOLUTION DENTAL at 13:12

## 2024-09-20 RX ADMIN — SERTRALINE HYDROCHLORIDE 25 MILLIGRAM(S): 100 TABLET, FILM COATED ORAL at 13:12

## 2024-09-20 RX ADMIN — Medication 1 DROP(S): at 18:14

## 2024-09-20 RX ADMIN — TRAMADOL HYDROCHLORIDE 25 MILLIGRAM(S): 50 TABLET, COATED ORAL at 22:06

## 2024-09-20 RX ADMIN — Medication 650 MILLIGRAM(S): at 06:34

## 2024-09-20 RX ADMIN — APIXABAN 2.5 MILLIGRAM(S): 5 TABLET, FILM COATED ORAL at 06:33

## 2024-09-20 RX ADMIN — Medication 650 MILLIGRAM(S): at 18:08

## 2024-09-20 RX ADMIN — SACUBITRIL AND VALSARTAN 1 TABLET(S): 97; 103 TABLET, FILM COATED ORAL at 18:09

## 2024-09-20 RX ADMIN — Medication 1 DROP(S): at 06:36

## 2024-09-20 RX ADMIN — FOLIC ACID 1 MILLIGRAM(S): 1 TABLET ORAL at 13:10

## 2024-09-20 RX ADMIN — APIXABAN 2.5 MILLIGRAM(S): 5 TABLET, FILM COATED ORAL at 18:09

## 2024-09-20 NOTE — PROGRESS NOTE ADULT - ATTENDING COMMENTS
Pt seen and examined at bedside with medical resident present. Overnight patient developed fever. Currently she has no signs of infection. Denies dysuria, diarrhea, rhinorrhea, cough, abdominal pain. She endoreses LE pain which is chronic. Pt was treated July 2024 for parainfluenza pneumonia. Check blood cultures, RVP. Monitor off abx as no leukocytotis and evidence of infection. Pt seen and examined at bedside with medical resident present. Overnight patient developed fever. Currently she has no signs of infection. Denies dysuria, diarrhea, rhinorrhea, cough, abdominal pain. She endoreses LE pain which is chronic. Pt was treated July 2024 for parainfluenza pneumonia. Check blood cultures, RVP. Can hold off adjusting abx as no leukocytotis and evidence of new infection.

## 2024-09-20 NOTE — PROGRESS NOTE ADULT - ASSESSMENT
89-year-old female, past medical history hypertension, DVT/PE, heart failure, AICD in place, RA, end-stage renal disease on hemodialysis gets dialysis Tuesday Thursday Saturday admitted for acute hypoxemic respiratory failure. Nephrology consulted regarding HD  # ESRD on HD.   - Has right tunneled catheter. HD in am  standard bath uf 1 liter as tolerated   - BC / staph epi / noted  ID f/up / on  vanco / follow trough  patient has TDC / repeat BC 9/15 negative  / follow vanco level   -last Phos at target, not on binders   - h/h noted / no need for ELIJAH   - bp conrolled   - ortho notes appreciated  will follow

## 2024-09-20 NOTE — PROGRESS NOTE ADULT - SUBJECTIVE AND OBJECTIVE BOX
seen and examined   24 h events noted      PAST HISTORY  --------------------------------------------------------------------------------  No significant changes to PMH, PSH, FHx, SHx, unless otherwise noted    ALLERGIES & MEDICATIONS  --------------------------------------------------------------------------------  Allergies    Keflex (Swelling)  cephalosporins (Angioedema)    Intolerances      Standing Inpatient Medications  apixaban 2.5 milliGRAM(s) Oral two times a day  atorvastatin 20 milliGRAM(s) Oral at bedtime  chlorhexidine 2% Cloths 1 Application(s) Topical daily  folic acid 1 milliGRAM(s) Oral daily  metoprolol succinate  milliGRAM(s) Oral daily  sacubitril 49 mG/valsartan 51 mG 1 Tablet(s) Oral two times a day  sertraline 25 milliGRAM(s) Oral daily  timolol 0.5% Solution 1 Drop(s) Both EYES two times a day    PRN Inpatient Medications  acetaminophen     Tablet .. 650 milliGRAM(s) Oral every 6 hours PRN  acetaminophen     Tablet .. 650 milliGRAM(s) Oral every 6 hours PRN  traMADol 25 milliGRAM(s) Oral every 6 hours PRN        VITALS/PHYSICAL EXAM  --------------------------------------------------------------------------------  T(C): 36.5 (09-20-24 @ 12:35), Max: 38.4 (09-19-24 @ 20:20)  HR: 60 (09-20-24 @ 12:35) (60 - 80)  BP: 105/61 (09-20-24 @ 12:35) (105/61 - 156/78)  RR: 18 (09-20-24 @ 12:35) (18 - 18)  SpO2: 96% (09-20-24 @ 12:35) (96% - 96%)  Wt(kg): --        09-19-24 @ 07:01  -  09-20-24 @ 07:00  --------------------------------------------------------  IN: 0 mL / OUT: 1000 mL / NET: -1000 mL      Physical Exam:  	Gen: NAD  	Pulm: decrese BS  B/L  	CV: S1S2; no rub  	Vascular access:tdc    LABS/STUDIES  --------------------------------------------------------------------------------              11.1   8.05  >-----------<  248      [09-20-24 @ 12:04]              35.4     130  |  92  |  30  ----------------------------<  200      [09-20-24 @ 12:04]  3.6   |  26  |  3.2        Ca     8.0     [09-20-24 @ 12:04]      Mg     1.8     [09-20-24 @ 12:04]    TPro  5.9  /  Alb  3.2  /  TBili  0.4  /  DBili  x   /  AST  36  /  ALT  17  /  AlkPhos  140  [09-20-24 @ 12:04]          Creatinine Trend:  SCr 3.2 [09-20 @ 12:04]  SCr 2.7 [09-18 @ 06:18]  SCr 3.7 [09-17 @ 06:46]  SCr 3.5 [09-16 @ 08:18]  SCr 3.4 [09-15 @ 20:00]    Urinalysis - [09-20-24 @ 12:04]      Color  / Appearance  / SG  / pH       Gluc 200 / Ketone   / Bili  / Urobili        Blood  / Protein  / Leuk Est  / Nitrite       RBC  / WBC  / Hyaline  / Gran  / Sq Epi  / Non Sq Epi  / Bacteria     Urine Sodium 39.0      [09-15-24 @ 10:28]  Urine Potassium 22      [09-15-24 @ 10:28]  Urine Osmolality 210      [09-15-24 @ 10:28]    Iron 33, TIBC 147, %sat 22      [04-04-24 @ 06:44]  Ferritin 785      [04-04-24 @ 06:44]  PTH -- (Ca 8.2)      [04-09-24 @ 05:43]   35  PTH -- (Ca 8.2)      [03-24-24 @ 20:00]   138  Vitamin D (25OH) 11      [04-09-24 @ 05:43]  Lipid: chol 144, , HDL 39, LDL --      [03-22-24 @ 06:14]

## 2024-09-20 NOTE — PROGRESS NOTE ADULT - SUBJECTIVE AND OBJECTIVE BOX
Patient is a 89 year old female who presented for SOB during dialysis and was found to be septic on admission secondary to UTI that grew staph hominis.    Today is hospital day 8d. This morning patient was seen and examined at bedside, resting comfortably in bed. Patient had fever 101.2 overnight, given one dose of tylenol, patient afebrile this morning. Blood cultures sent.     CODE STATUS: DNR/DNI    FAMILY COMMUNICATION  Contact date:  Name of person contacted:  Relationship to patient:  Communication details:      VITALS  T(F): 98.3 (09-20-24 @ 05:34), Max: 101.2 (09-19-24 @ 20:20)  HR: 65 (09-20-24 @ 05:34) (65 - 80)  BP: 156/78 (09-20-24 @ 05:34) (153/88 - 189/89)  RR: 18 (09-20-24 @ 05:34) (18 - 18)  SpO2: 94% (09-19-24 @ 12:25) (94% - 94%)  POCT Blood Glucose.: 136 mg/dL (09-19-24 @ 21:04)  POCT Blood Glucose.: 93 mg/dL (09-19-24 @ 16:24)      PHYSICAL EXAM:  GENERAL: NAD, lying in bed comfortably  HEAD:  Atraumatic, Normocephalic  EYES: EOMI, PERRLA, conjunctiva and sclera clear  ENT: Moist mucous membranes  NECK: Supple, No JVD  CHEST/LUNG: Clear to auscultation bilaterally; No rales, rhonchi, wheezing, or rubs. Unlabored respirations  HEART: Regular rate and rhythm; No murmurs, rubs, or gallops  ABDOMEN: Bowel sounds present; Soft, Nontender, Nondistended. No hepatomegaly  EXTREMITIES:  2+ Peripheral Pulses, brisk capillary refill. No clubbing, cyanosis, or edema  NERVOUS SYSTEM:  Alert & Oriented X3, speech clear. No deficits   MSK: FROM all 4 extremities, full and equal strength  SKIN: No rashes or lesions    LABS                    IMAGING    MEDICATIONS  STANDING MEDICATIONS  apixaban 2.5 milliGRAM(s) Oral two times a day  atorvastatin 20 milliGRAM(s) Oral at bedtime  chlorhexidine 2% Cloths 1 Application(s) Topical daily  folic acid 1 milliGRAM(s) Oral daily  metoprolol succinate  milliGRAM(s) Oral daily  sacubitril 49 mG/valsartan 51 mG 1 Tablet(s) Oral two times a day  sertraline 25 milliGRAM(s) Oral daily  timolol 0.5% Solution 1 Drop(s) Both EYES two times a day    PRN MEDICATIONS  acetaminophen     Tablet .. 650 milliGRAM(s) Oral every 6 hours PRN

## 2024-09-20 NOTE — PROGRESS NOTE ADULT - ASSESSMENT
Patient is an 89-year-old female, past medical history hypertension, DVT/PE, heart failure, AICD in place, RA, end-stage renal disease on hemodialysis gets dialysis Tuesday Thursday Saturday presents emergency department from dialysis for acute shortness of breath, was dx. with sepsis due to staph hominis bacteremia.     #Sepsis, POA (Febrile 101.2 and tachycardic) improved   # Suspected UTI-  # Staph hominis bacteremia  CTAP 09/12 negative for acute pathology  Blood Cxs  09/12 - staph hominis and staph. epidermitis meth. resistant  MRSA PCR neg, repeated blood cxs neg prelim.  - ID  vancomycin 1g post HD x 14 days  - new onset fever overnight  - repeat blood cultures sent    #hip pain  #hx of hip fracture  - bilateral hip xrays taken  - evaluated by ortho: no intervention at this time  - tylenol and tramadol PRN for pain    #Acute Hypoxic Respiratory Failure, resolved  CXR 09/12 showing pulmonary edema/interstitial opacities  - fluid removal via HD    #ESRD on HD TTS - HD per nephrology    #HTN  #Chronic HFrEF 25-30%  #HO PE/DVT  #H/o NSVT   Echo: (7/23)  1. Normal left ventricular internal cavity size.   2. Severely decreased global left ventricular systolic function.   3. Left ventricular ejection fraction, by visual estimation, is 25 to 30%.  -c/w Entresto   -c/w atorvastatin 20 mg qhs  -c/w metoprolol  mg qd  -c/w Eliquis 2.5 mg bid    # Macrocytic anemia - stable h/h, obtain Vit B12, folate levels    DVT PPX, Eliquis    Code status: DNR/DNI    follow up:   - Vanc dosing AUC/YANY per clinical pharmacist - change to 750mg post HD to be given at dialysis via TDC x 14 days end 9/26   pending placement.   ID cleared patient. will be discharged when bed available follow up CM  i called family and updated. sh was very appreciative of call.

## 2024-09-21 LAB
ANION GAP SERPL CALC-SCNC: 8 MMOL/L — SIGNIFICANT CHANGE UP (ref 7–14)
BASOPHILS # BLD AUTO: 0.05 K/UL — SIGNIFICANT CHANGE UP (ref 0–0.2)
BASOPHILS NFR BLD AUTO: 0.8 % — SIGNIFICANT CHANGE UP (ref 0–1)
BUN SERPL-MCNC: 15 MG/DL — SIGNIFICANT CHANGE UP (ref 10–20)
CALCIUM SERPL-MCNC: 7.7 MG/DL — LOW (ref 8.4–10.4)
CHLORIDE SERPL-SCNC: 98 MMOL/L — SIGNIFICANT CHANGE UP (ref 98–110)
CO2 SERPL-SCNC: 29 MMOL/L — SIGNIFICANT CHANGE UP (ref 17–32)
CREAT SERPL-MCNC: 2.3 MG/DL — HIGH (ref 0.7–1.5)
EGFR: 20 ML/MIN/1.73M2 — LOW
EOSINOPHIL # BLD AUTO: 0.3 K/UL — SIGNIFICANT CHANGE UP (ref 0–0.7)
EOSINOPHIL NFR BLD AUTO: 5 % — SIGNIFICANT CHANGE UP (ref 0–8)
GLUCOSE SERPL-MCNC: 117 MG/DL — HIGH (ref 70–99)
HCT VFR BLD CALC: 36 % — LOW (ref 37–47)
HGB BLD-MCNC: 11.3 G/DL — LOW (ref 12–16)
IMM GRANULOCYTES NFR BLD AUTO: 0.3 % — SIGNIFICANT CHANGE UP (ref 0.1–0.3)
LYMPHOCYTES # BLD AUTO: 1.12 K/UL — LOW (ref 1.2–3.4)
LYMPHOCYTES # BLD AUTO: 18.7 % — LOW (ref 20.5–51.1)
MAGNESIUM SERPL-MCNC: 1.8 MG/DL — SIGNIFICANT CHANGE UP (ref 1.8–2.4)
MCHC RBC-ENTMCNC: 29.4 PG — SIGNIFICANT CHANGE UP (ref 27–31)
MCHC RBC-ENTMCNC: 31.4 G/DL — LOW (ref 32–37)
MCV RBC AUTO: 93.5 FL — SIGNIFICANT CHANGE UP (ref 81–99)
MONOCYTES # BLD AUTO: 0.84 K/UL — HIGH (ref 0.1–0.6)
MONOCYTES NFR BLD AUTO: 14 % — HIGH (ref 1.7–9.3)
NEUTROPHILS # BLD AUTO: 3.65 K/UL — SIGNIFICANT CHANGE UP (ref 1.4–6.5)
NEUTROPHILS NFR BLD AUTO: 61.2 % — SIGNIFICANT CHANGE UP (ref 42.2–75.2)
NRBC # BLD: 0 /100 WBCS — SIGNIFICANT CHANGE UP (ref 0–0)
PLATELET # BLD AUTO: 227 K/UL — SIGNIFICANT CHANGE UP (ref 130–400)
PMV BLD: 10.1 FL — SIGNIFICANT CHANGE UP (ref 7.4–10.4)
POTASSIUM SERPL-MCNC: 3.7 MMOL/L — SIGNIFICANT CHANGE UP (ref 3.5–5)
POTASSIUM SERPL-SCNC: 3.7 MMOL/L — SIGNIFICANT CHANGE UP (ref 3.5–5)
RBC # BLD: 3.85 M/UL — LOW (ref 4.2–5.4)
RBC # FLD: 15.9 % — HIGH (ref 11.5–14.5)
SODIUM SERPL-SCNC: 135 MMOL/L — SIGNIFICANT CHANGE UP (ref 135–146)
WBC # BLD: 5.98 K/UL — SIGNIFICANT CHANGE UP (ref 4.8–10.8)
WBC # FLD AUTO: 5.98 K/UL — SIGNIFICANT CHANGE UP (ref 4.8–10.8)

## 2024-09-21 PROCEDURE — 99233 SBSQ HOSP IP/OBS HIGH 50: CPT

## 2024-09-21 RX ORDER — LIDOCAINE 50 MG/G
1 CREAM TOPICAL DAILY
Refills: 0 | Status: DISCONTINUED | OUTPATIENT
Start: 2024-09-21 | End: 2024-09-25

## 2024-09-21 RX ADMIN — Medication 100 MILLIGRAM(S): at 07:15

## 2024-09-21 RX ADMIN — FOLIC ACID 1 MILLIGRAM(S): 1 TABLET ORAL at 13:08

## 2024-09-21 RX ADMIN — Medication 1 DROP(S): at 18:28

## 2024-09-21 RX ADMIN — VANCOMYCIN HCL-SODIUM CHLORIDE IV SOLN 1.5 GM/250ML-0.9% 250 MILLIGRAM(S): 1.5-0.9/25 SOLUTION at 11:54

## 2024-09-21 RX ADMIN — SERTRALINE HYDROCHLORIDE 25 MILLIGRAM(S): 100 TABLET, FILM COATED ORAL at 01:08

## 2024-09-21 RX ADMIN — LIDOCAINE 1 PATCH: 50 CREAM TOPICAL at 13:09

## 2024-09-21 RX ADMIN — TRAMADOL HYDROCHLORIDE 25 MILLIGRAM(S): 50 TABLET, COATED ORAL at 18:50

## 2024-09-21 RX ADMIN — Medication 1 DROP(S): at 07:14

## 2024-09-21 RX ADMIN — SACUBITRIL AND VALSARTAN 1 TABLET(S): 97; 103 TABLET, FILM COATED ORAL at 18:28

## 2024-09-21 RX ADMIN — Medication 650 MILLIGRAM(S): at 09:15

## 2024-09-21 RX ADMIN — APIXABAN 2.5 MILLIGRAM(S): 5 TABLET, FILM COATED ORAL at 07:12

## 2024-09-21 RX ADMIN — TRAMADOL HYDROCHLORIDE 25 MILLIGRAM(S): 50 TABLET, COATED ORAL at 18:26

## 2024-09-21 RX ADMIN — ATORVASTATIN CALCIUM 20 MILLIGRAM(S): 10 TABLET, FILM COATED ORAL at 21:44

## 2024-09-21 RX ADMIN — CHLORHEXIDINE GLUCONATE ORAL RINSE 1 APPLICATION(S): 1.2 SOLUTION DENTAL at 14:59

## 2024-09-21 RX ADMIN — SACUBITRIL AND VALSARTAN 1 TABLET(S): 97; 103 TABLET, FILM COATED ORAL at 07:13

## 2024-09-21 RX ADMIN — APIXABAN 2.5 MILLIGRAM(S): 5 TABLET, FILM COATED ORAL at 18:28

## 2024-09-21 RX ADMIN — Medication 650 MILLIGRAM(S): at 08:44

## 2024-09-21 NOTE — PROGRESS NOTE ADULT - ASSESSMENT
Patient is an 89-year-old female, past medical history hypertension, DVT/PE, heart failure, AICD in place, RA, end-stage renal disease on hemodialysis gets dialysis Tuesday Thursday Saturday presents emergency department from dialysis for acute shortness of breath, was dx. with sepsis due to staph hominis bacteremia.     #Sepsis, POA (Febrile 101.2 and tachycardic) improved   # Suspected UTI-  # Staph hominis bacteremia  CTAP 09/12 negative for acute pathology  Blood Cxs  09/12 - staph hominis and staph. epidermitis meth. resistant  MRSA PCR neg, repeated blood cxs neg prelim.  - ID  vancomycin 1g post HD x 14 days  - new onset fever 9/20  - f/u blood cultures, NGTD    #hip pain  #hx of hip fracture  - bilateral hip xrays taken  - evaluated by ortho: no intervention at this time  - tylenol and tramadol PRN for pain    #Acute Hypoxic Respiratory Failure, resolved  CXR 09/12 showing pulmonary edema/interstitial opacities  - fluid removal via HD    #ESRD on HD TTS - HD per nephrology  - HD today (9/21)    #HTN  #Chronic HFrEF 25-30%  #HO PE/DVT  #H/o NSVT   Echo: (7/23)  1. Normal left ventricular internal cavity size.   2. Severely decreased global left ventricular systolic function.   3. Left ventricular ejection fraction, by visual estimation, is 25 to 30%.  -c/w Entresto   -c/w atorvastatin 20 mg qhs  -c/w metoprolol  mg qd  -c/w Eliquis 2.5 mg bid    # Macrocytic anemia - stable h/h, obtain Vit B12, folate levels    DVT PPX, Eliquis    Code status: DNR/DNI    Pending placement with   HD today, vanc 750 given via TDC x 14 days end 9/26

## 2024-09-21 NOTE — PROGRESS NOTE ADULT - ASSESSMENT
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 with sepsis due to staph hominis bacteremia. To note patient was hospitalized 7/2024 for parainfluenza PNA      Sepsis, POA (Febrile 101.2 and tachycardic) improved   Suspected UTI-  Staph hominis bacteremia  CTAP 09/12 negative for acute pathology  Blood Cxs  09/12 - staph hominis and staph. epidermitis meth. resistant  MRSA PCR neg, repeated blood cxs neg prelim.  - ID  vancomycin 1g post HD x 14 days  - new onset fever 9/20, viral swab ordered, never sent by nursing staff. Pt afebrile since so can hold off  - f/u blood cultures, NGTD    Hyperglycemia  - had  on BMP yesterday  - check FS  - no history of DM II, suspect this was error    Hyponatremia  - s/o HD today   - f/u BMP in AM    Chronic  HFrEF   -Non ischemic Cardiomyopathy: s/p AICD  -Patient with SOB, cough,  -Continue Entresto and Metoprolol.     HIp and knee pain  - chronic, had previously improvmeent with prednisone  - needs outpatient rheum eval    ESRD on HD TTS  Continue HD.     HTN  Continue Entresto, Amlodipine and Metoprolol.     history PE/DVT  RA    DVT prophylaxis: Eliquis  GI prophylaxis: PPI    Code status: DNR/DNI     #Progress Note Handoff:  Pending (specify): placement  Family discussion: as above with pt  Disposition: from SNF

## 2024-09-21 NOTE — PROGRESS NOTE ADULT - SUBJECTIVE AND OBJECTIVE BOX
CHIEF COMPLAINT:    Patient is a 89y old  Female who presents with a chief complaint of chest pain/sob (21 Sep 2024 09:54)      INTERVAL HPI/OVERNIGHT EVENTS:    Patient seen and examined at bedside. No acute overnight events occurred.    ROS: All other systems are negative.    Medications:  Standing  apixaban 2.5 milliGRAM(s) Oral two times a day  atorvastatin 20 milliGRAM(s) Oral at bedtime  chlorhexidine 2% Cloths 1 Application(s) Topical daily  folic acid 1 milliGRAM(s) Oral daily  lidocaine   4% Patch 1 Patch Transdermal daily  metoprolol succinate  milliGRAM(s) Oral daily  sacubitril 49 mG/valsartan 51 mG 1 Tablet(s) Oral two times a day  sertraline 25 milliGRAM(s) Oral daily  timolol 0.5% Solution 1 Drop(s) Both EYES two times a day  vancomycin  IVPB 750 milliGRAM(s) IV Intermittent <User Schedule>    PRN Meds  acetaminophen     Tablet .. 650 milliGRAM(s) Oral every 6 hours PRN  acetaminophen     Tablet .. 650 milliGRAM(s) Oral every 6 hours PRN  traMADol 25 milliGRAM(s) Oral every 6 hours PRN        Vital Signs:    T(F): 98 (24 @ 04:37), Max: 98 (24 @ 04:37)  HR: 59 (24 @ 12:00) (59 - 67)  BP: 124/61 (24 @ 12:00) (112/71 - 145/54)  RR: 18 (24 @ 12:00) (17 - 18)  SpO2: --  I&O's Summary    21 Sep 2024 07:01  -  21 Sep 2024 14:43  --------------------------------------------------------  IN: 0 mL / OUT: 1000 mL / NET: -1000 mL      Daily     Daily Weight in k.8 (21 Sep 2024 04:37)  CAPILLARY BLOOD GLUCOSE          PHYSICAL EXAM:  GENERAL:  NAD  SKIN: No rashes or lesions  HEENT: Atraumatic. Normocephalic. Anicteric  NECK:  No JVD.   PULMONARY: Clear to ausculation bilaterally. No wheezing. No rales  CVS: Normal S1, S2. Regular rate and rhythm. No murmurs.  ABDOMEN/GI: Soft, Nontender, Nondistended; Bowel sounds are present  EXTREMITIES:  No edema B/L LE.  NEUROLOGIC:  No motor deficit.  PSYCH: Alert & oriented x 3, normal affect      LABS:                        11.1   8.05  )-----------( 248      ( 20 Sep 2024 12:04 )             35.4         130[L]  |  92[L]  |  30[H]  ----------------------------<  200[H]  3.6   |  26  |  3.2[H]    Ca    8.0[L]      20 Sep 2024 12:04  Mg     1.8         TPro  5.9[L]  /  Alb  3.2[L]  /  TBili  0.4  /  DBili  x   /  AST  36  /  ALT  17  /  AlkPhos  140[H]              Culture - Blood (collected 19 Sep 2024 22:15)  Source: .Blood Blood  Preliminary Report (21 Sep 2024 10:01):    No growth at 24 hours        RADIOLOGY & ADDITIONAL TESTS:    Consultant(s) Notes Reviewed:  [ ] YES  [ ] NO  Care Discussed with Consultants/Other Providers [ ] YES  [ ] NO    Case discussed with resident  Care discussed with pt

## 2024-09-21 NOTE — PROGRESS NOTE ADULT - SUBJECTIVE AND OBJECTIVE BOX
Patient is a 89 year old female who presented for SOB during dialysis and was found to be septic on admission secondary to UTI that grew staph hominis.    24H events:    Today is 9d of hospitalization. This morning patient was seen and examined at bedside, resting comfortably in bed. Patient afebrile at this time. Following up blood cultures given fever yesterday.  No acute or major events overnight.      PAST MEDICAL & SURGICAL HISTORY  Hypertension    Gout    Diverticulitis  sigmoid    Rheumatoid arthritis    DVT, lower extremity  Bilateral    Pulmonary embolism    Chronic HFrEF (heart failure with reduced ejection fraction)    AICD (automatic cardioverter/defibrillator) present    ESRD on dialysis    Artificial pacemaker    History of appendectomy    History of tonsillectomy    History of hysterectomy    H/O hernia repair      SOCIAL HISTORY:  Social History:      ALLERGIES:  Keflex (Swelling)  cephalosporins (Angioedema)    MEDICATIONS:  STANDING MEDICATIONS  apixaban 2.5 milliGRAM(s) Oral two times a day  atorvastatin 20 milliGRAM(s) Oral at bedtime  chlorhexidine 2% Cloths 1 Application(s) Topical daily  folic acid 1 milliGRAM(s) Oral daily  metoprolol succinate  milliGRAM(s) Oral daily  sacubitril 49 mG/valsartan 51 mG 1 Tablet(s) Oral two times a day  sertraline 25 milliGRAM(s) Oral daily  timolol 0.5% Solution 1 Drop(s) Both EYES two times a day  vancomycin  IVPB 750 milliGRAM(s) IV Intermittent <User Schedule>    PRN MEDICATIONS  acetaminophen     Tablet .. 650 milliGRAM(s) Oral every 6 hours PRN  acetaminophen     Tablet .. 650 milliGRAM(s) Oral every 6 hours PRN  traMADol 25 milliGRAM(s) Oral every 6 hours PRN    VITALS:   T(F): 98  HR: 60  BP: 121/61  RR: 18  SpO2: 96%    PHYSICAL EXAM:  GENERAL:   ( x) NAD, lying in bed comfortably     (  ) obtunded     (  ) lethargic     (  ) somnolent      NECK:  (x) Supple     (  ) neck stiffness     (  ) nuchal rigidity     (  )  no JVD     (  ) JVD present ( -- cm)    HEART:  Rate -->     (x) normal rate     (  ) bradycardic     (  ) tachycardic  Rhythm -->     (x) regular     (  ) regularly irregular     (  ) irregularly irregular  Murmurs -->     (x) normal s1s2     (  ) systolic murmur     (  ) diastolic murmur     (  ) continuous murmur      (  ) S3 present     (  ) S4 present    LUNGS:   ( x)Unlabored respirations     (  ) tachypnea  ( x) B/L air entry     (  ) decreased breath sounds in:  (location     )    ( x) no adventitious sound     (  ) crackles     (  ) wheezing      (  ) rhonchi      (specify location:       )  (  ) chest wall tenderness (specify location:       )    ABDOMEN:   ( x) Soft     (  ) tense   |   (  ) nondistended     (  ) distended   |   (  ) +BS     (  ) hypoactive bowel sounds     (  ) hyperactive bowel sounds  ( x) nontender     (  ) RUQ tenderness     (  ) RLQ tenderness     (  ) LLQ tenderness     (  ) epigastric tenderness     (  ) diffuse tenderness  (  ) Splenomegaly      (  ) Hepatomegaly      (  ) Jaundice     (  ) ecchymosis     EXTREMITIES:  ( x) Normal     (  ) Rash     (  ) ecchymosis     (  ) varicose veins      (  ) pitting edema     (  ) non-pitting edema   (  ) ulceration     (  ) gangrene:     (location:     )    NERVOUS SYSTEM:    ( x) A&Ox3     (  ) confused     (  ) lethargic  CN II-XII:     ( x) Intact     (  ) deficits found     (Specify:     )   Upper extremities:     (  ) no sensorimotor deficits     (  ) weakness     (  ) loss of proprioception/vibration     (  ) loss of touch/temperature (specify:    )  Lower extremities:     (  ) no sensorimotor deficits     (  ) weakness     (  ) loss of proprioception/vibration     (  ) loss of touch/temperature (specify:    )    SKIN:   (  ) No rashes or lesions     (  ) maculopapular rash     (  ) pustules     (  ) vesicles     (  ) ulcer     (  ) ecchymosis     (specify location:     )      LABS:                        11.1   8.05  )-----------( 248      ( 20 Sep 2024 12:04 )             35.4     09-20    130[L]  |  92[L]  |  30[H]  ----------------------------<  200[H]  3.6   |  26  |  3.2[H]    Ca    8.0[L]      20 Sep 2024 12:04  Mg     1.8     09-20    TPro  5.9[L]  /  Alb  3.2[L]  /  TBili  0.4  /  DBili  x   /  AST  36  /  ALT  17  /  AlkPhos  140[H]  09-20      Urinalysis Basic - ( 20 Sep 2024 12:04 )    Color: x / Appearance: x / SG: x / pH: x  Gluc: 200 mg/dL / Ketone: x  / Bili: x / Urobili: x   Blood: x / Protein: x / Nitrite: x   Leuk Esterase: x / RBC: x / WBC x   Sq Epi: x / Non Sq Epi: x / Bacteria: x

## 2024-09-21 NOTE — PROGRESS NOTE ADULT - SUBJECTIVE AND OBJECTIVE BOX
seen and examined  24 h events noted        PAST HISTORY  --------------------------------------------------------------------------------  No significant changes to PMH, PSH, FHx, SHx, unless otherwise noted    ALLERGIES & MEDICATIONS  --------------------------------------------------------------------------------  Allergies    Keflex (Swelling)  cephalosporins (Angioedema)    Intolerances      Standing Inpatient Medications  apixaban 2.5 milliGRAM(s) Oral two times a day  atorvastatin 20 milliGRAM(s) Oral at bedtime  chlorhexidine 2% Cloths 1 Application(s) Topical daily  folic acid 1 milliGRAM(s) Oral daily  metoprolol succinate  milliGRAM(s) Oral daily  sacubitril 49 mG/valsartan 51 mG 1 Tablet(s) Oral two times a day  sertraline 25 milliGRAM(s) Oral daily  timolol 0.5% Solution 1 Drop(s) Both EYES two times a day  vancomycin  IVPB 750 milliGRAM(s) IV Intermittent <User Schedule>    PRN Inpatient Medications  acetaminophen     Tablet .. 650 milliGRAM(s) Oral every 6 hours PRN  acetaminophen     Tablet .. 650 milliGRAM(s) Oral every 6 hours PRN  traMADol 25 milliGRAM(s) Oral every 6 hours PRN        VITALS/PHYSICAL EXAM  --------------------------------------------------------------------------------  T(C): 36.7 (09-21-24 @ 04:37), Max: 36.7 (09-21-24 @ 04:37)  HR: 66 (09-21-24 @ 04:37) (60 - 67)  BP: 112/71 (09-21-24 @ 04:37) (105/61 - 145/54)  RR: 17 (09-21-24 @ 04:37) (17 - 18)  SpO2: 96% (09-20-24 @ 12:35) (96% - 96%)  Wt(kg): --        Physical Exam:  	Gen: NAD  	Pulm: CTA B/L  	CV:  S1S2; no rub  	Abd: soft, /nondistended  	Vascular access:tdc       LABS/STUDIES  --------------------------------------------------------------------------------              11.1   8.05  >-----------<  248      [09-20-24 @ 12:04]              35.4     130  |  92  |  30  ----------------------------<  200      [09-20-24 @ 12:04]  3.6   |  26  |  3.2        Ca     8.0     [09-20-24 @ 12:04]      Mg     1.8     [09-20-24 @ 12:04]    TPro  5.9  /  Alb  3.2  /  TBili  0.4  /  DBili  x   /  AST  36  /  ALT  17  /  AlkPhos  140  [09-20-24 @ 12:04]      Creatinine Trend:  SCr 3.2 [09-20 @ 12:04]  SCr 2.7 [09-18 @ 06:18]  SCr 3.7 [09-17 @ 06:46]  SCr 3.5 [09-16 @ 08:18]  SCr 3.4 [09-15 @ 20:00]    Urinalysis - [09-20-24 @ 12:04]      Color  / Appearance  / SG  / pH       Gluc 200 / Ketone   / Bili  / Urobili        Blood  / Protein  / Leuk Est  / Nitrite       RBC  / WBC  / Hyaline  / Gran  / Sq Epi  / Non Sq Epi  / Bacteria     Urine Sodium 39.0      [09-15-24 @ 10:28]  Urine Potassium 22      [09-15-24 @ 10:28]  Urine Osmolality 210      [09-15-24 @ 10:28]    Iron 33, TIBC 147, %sat 22      [04-04-24 @ 06:44]  Ferritin 785      [04-04-24 @ 06:44]  PTH -- (Ca 8.2)      [04-09-24 @ 05:43]   35  PTH -- (Ca 8.2)      [03-24-24 @ 20:00]   138  Vitamin D (25OH) 11      [04-09-24 @ 05:43]  Lipid: chol 144, , HDL 39, LDL --      [03-22-24 @ 06:14]

## 2024-09-21 NOTE — PROGRESS NOTE ADULT - ASSESSMENT
89-year-old female, past medical history hypertension, DVT/PE, heart failure, AICD in place, RA, end-stage renal disease on hemodialysis gets dialysis Tuesday Thursday Saturday admitted for acute hypoxemic respiratory failure. Nephrology consulted regarding HD  # ESRD on HD.   - Has right tunneled catheter. HD today  3 k bath uf 1 liter as tolerated   - on  vanco /   repeat BC 9/15 negative  / follow vanco level   -last Phos at target, not on binders   - h/h noted / no need for ELIJAH   - bp conrolled   - ortho notes appreciated  will follow

## 2024-09-22 LAB
ANION GAP SERPL CALC-SCNC: 12 MMOL/L — SIGNIFICANT CHANGE UP (ref 7–14)
BASOPHILS # BLD AUTO: 0.04 K/UL — SIGNIFICANT CHANGE UP (ref 0–0.2)
BASOPHILS NFR BLD AUTO: 0.7 % — SIGNIFICANT CHANGE UP (ref 0–1)
BUN SERPL-MCNC: 22 MG/DL — HIGH (ref 10–20)
CALCIUM SERPL-MCNC: 7.9 MG/DL — LOW (ref 8.4–10.5)
CHLORIDE SERPL-SCNC: 99 MMOL/L — SIGNIFICANT CHANGE UP (ref 98–110)
CO2 SERPL-SCNC: 26 MMOL/L — SIGNIFICANT CHANGE UP (ref 17–32)
CREAT SERPL-MCNC: 2.9 MG/DL — HIGH (ref 0.7–1.5)
EGFR: 15 ML/MIN/1.73M2 — LOW
EOSINOPHIL # BLD AUTO: 0.41 K/UL — SIGNIFICANT CHANGE UP (ref 0–0.7)
EOSINOPHIL NFR BLD AUTO: 7.3 % — SIGNIFICANT CHANGE UP (ref 0–8)
GLUCOSE SERPL-MCNC: 87 MG/DL — SIGNIFICANT CHANGE UP (ref 70–99)
HCT VFR BLD CALC: 34 % — LOW (ref 37–47)
HGB BLD-MCNC: 10.5 G/DL — LOW (ref 12–16)
IMM GRANULOCYTES NFR BLD AUTO: 0.4 % — HIGH (ref 0.1–0.3)
LYMPHOCYTES # BLD AUTO: 1.6 K/UL — SIGNIFICANT CHANGE UP (ref 1.2–3.4)
LYMPHOCYTES # BLD AUTO: 28.5 % — SIGNIFICANT CHANGE UP (ref 20.5–51.1)
MAGNESIUM SERPL-MCNC: 2 MG/DL — SIGNIFICANT CHANGE UP (ref 1.8–2.4)
MCHC RBC-ENTMCNC: 28.8 PG — SIGNIFICANT CHANGE UP (ref 27–31)
MCHC RBC-ENTMCNC: 30.9 G/DL — LOW (ref 32–37)
MCV RBC AUTO: 93.4 FL — SIGNIFICANT CHANGE UP (ref 81–99)
MONOCYTES # BLD AUTO: 0.73 K/UL — HIGH (ref 0.1–0.6)
MONOCYTES NFR BLD AUTO: 13 % — HIGH (ref 1.7–9.3)
NEUTROPHILS # BLD AUTO: 2.81 K/UL — SIGNIFICANT CHANGE UP (ref 1.4–6.5)
NEUTROPHILS NFR BLD AUTO: 50.1 % — SIGNIFICANT CHANGE UP (ref 42.2–75.2)
NRBC # BLD: 0 /100 WBCS — SIGNIFICANT CHANGE UP (ref 0–0)
PLATELET # BLD AUTO: 254 K/UL — SIGNIFICANT CHANGE UP (ref 130–400)
PMV BLD: 9.8 FL — SIGNIFICANT CHANGE UP (ref 7.4–10.4)
POTASSIUM SERPL-MCNC: 3.7 MMOL/L — SIGNIFICANT CHANGE UP (ref 3.5–5)
POTASSIUM SERPL-SCNC: 3.7 MMOL/L — SIGNIFICANT CHANGE UP (ref 3.5–5)
RAPID RVP RESULT: SIGNIFICANT CHANGE UP
RBC # BLD: 3.64 M/UL — LOW (ref 4.2–5.4)
RBC # FLD: 16 % — HIGH (ref 11.5–14.5)
SARS-COV-2 RNA SPEC QL NAA+PROBE: SIGNIFICANT CHANGE UP
SODIUM SERPL-SCNC: 137 MMOL/L — SIGNIFICANT CHANGE UP (ref 135–146)
WBC # BLD: 5.61 K/UL — SIGNIFICANT CHANGE UP (ref 4.8–10.8)
WBC # FLD AUTO: 5.61 K/UL — SIGNIFICANT CHANGE UP (ref 4.8–10.8)

## 2024-09-22 PROCEDURE — 99232 SBSQ HOSP IP/OBS MODERATE 35: CPT

## 2024-09-22 RX ORDER — LIDOCAINE 50 MG/G
1 CREAM TOPICAL DAILY
Refills: 0 | Status: DISCONTINUED | OUTPATIENT
Start: 2024-09-22 | End: 2024-09-25

## 2024-09-22 RX ADMIN — Medication 1 DROP(S): at 18:01

## 2024-09-22 RX ADMIN — FOLIC ACID 1 MILLIGRAM(S): 1 TABLET ORAL at 13:02

## 2024-09-22 RX ADMIN — TRAMADOL HYDROCHLORIDE 25 MILLIGRAM(S): 50 TABLET, COATED ORAL at 09:20

## 2024-09-22 RX ADMIN — Medication 100 MILLIGRAM(S): at 05:41

## 2024-09-22 RX ADMIN — ATORVASTATIN CALCIUM 20 MILLIGRAM(S): 10 TABLET, FILM COATED ORAL at 22:38

## 2024-09-22 RX ADMIN — APIXABAN 2.5 MILLIGRAM(S): 5 TABLET, FILM COATED ORAL at 17:59

## 2024-09-22 RX ADMIN — SERTRALINE HYDROCHLORIDE 25 MILLIGRAM(S): 100 TABLET, FILM COATED ORAL at 13:03

## 2024-09-22 RX ADMIN — LIDOCAINE 1 PATCH: 50 CREAM TOPICAL at 13:03

## 2024-09-22 RX ADMIN — TRAMADOL HYDROCHLORIDE 25 MILLIGRAM(S): 50 TABLET, COATED ORAL at 08:51

## 2024-09-22 RX ADMIN — SACUBITRIL AND VALSARTAN 1 TABLET(S): 97; 103 TABLET, FILM COATED ORAL at 17:59

## 2024-09-22 RX ADMIN — Medication 1 DROP(S): at 05:41

## 2024-09-22 RX ADMIN — APIXABAN 2.5 MILLIGRAM(S): 5 TABLET, FILM COATED ORAL at 05:41

## 2024-09-22 RX ADMIN — CHLORHEXIDINE GLUCONATE ORAL RINSE 1 APPLICATION(S): 1.2 SOLUTION DENTAL at 13:04

## 2024-09-22 RX ADMIN — SACUBITRIL AND VALSARTAN 1 TABLET(S): 97; 103 TABLET, FILM COATED ORAL at 05:41

## 2024-09-22 NOTE — PROGRESS NOTE ADULT - SUBJECTIVE AND OBJECTIVE BOX
Nephrology Progress Note    RACHEL SUMMERS  MRN-[id number]  89y  Female    S:  Patient is seen and examined, events over the last 24h noted.    O:  Allergies:  Keflex (Swelling)  cephalosporins (Angioedema)    Hospital Medications:   MEDICATIONS  (STANDING):  apixaban 2.5 milliGRAM(s) Oral two times a day  atorvastatin 20 milliGRAM(s) Oral at bedtime  chlorhexidine 2% Cloths 1 Application(s) Topical daily  folic acid 1 milliGRAM(s) Oral daily  lidocaine   4% Patch 1 Patch Transdermal daily  metoprolol succinate  milliGRAM(s) Oral daily  sacubitril 49 mG/valsartan 51 mG 1 Tablet(s) Oral two times a day  sertraline 25 milliGRAM(s) Oral daily  timolol 0.5% Solution 1 Drop(s) Both EYES two times a day  vancomycin  IVPB 750 milliGRAM(s) IV Intermittent <User Schedule>    MEDICATIONS  (PRN):  acetaminophen     Tablet .. 650 milliGRAM(s) Oral every 6 hours PRN Temp greater or equal to 38C (100.4F), Mild Pain (1 - 3), Moderate Pain (4 - 6)  acetaminophen     Tablet .. 650 milliGRAM(s) Oral every 6 hours PRN Mild Pain (1 - 3)  traMADol 25 milliGRAM(s) Oral every 6 hours PRN Moderate Pain (4 - 6)    Home Medications:  apixaban 2.5 mg oral tablet: 1 tab(s) orally 2 times a day (19 Sep 2024 10:18)  atorvastatin 20 mg oral tablet: 1 tab(s) orally once a day (at bedtime) (19 Sep 2024 10:18)  metoprolol succinate 100 mg oral tablet, extended release: 1 tab(s) orally once a day (19 Sep 2024 10:18)  sacubitril-valsartan 49 mg-51 mg oral tablet: 1 tab(s) orally 2 times a day (19 Sep 2024 10:18)  sertraline 25 mg oral tablet: 1 tab(s) orally once a day (19 Sep 2024 10:18)  vancomycin 750 mg intravenous injection: 750 milligram(s) intravenous 3 times a week after hemodialysis session till 9/26. (19 Sep 2024 10:18)      VITALS:  Daily     Daily   T(F): 98.5 (09-22-24 @ 04:54), Max: 98.5 (09-22-24 @ 04:54)  HR: 71 (09-22-24 @ 04:54)  BP: 130/70 (09-22-24 @ 04:54)  RR: 18 (09-22-24 @ 04:54)  SpO2: 98% (09-22-24 @ 04:54)  Wt(kg): --  I&O's Detail    21 Sep 2024 07:01  -  22 Sep 2024 07:00  --------------------------------------------------------  IN:  Total IN: 0 mL    OUT:    Other (mL): 1000 mL  Total OUT: 1000 mL    Total NET: -1000 mL        I&O's Summary    21 Sep 2024 07:01  -  22 Sep 2024 07:00  --------------------------------------------------------  IN: 0 mL / OUT: 1000 mL / NET: -1000 mL          PHYSICAL EXAM:  Gen: NAD  Chest: b/l breath sounds  Abd: soft  Vascular access: TDC      LABS:      09-22    137  |  99  |  22[H]  ----------------------------<  87  3.7   |  26  |  2.9[H]    Ca    7.9[L]      22 Sep 2024 07:50  Mg     2.0     09-22    TPro  5.9[L]  /  Alb  3.2[L]  /  TBili  0.4  /  DBili      /  AST  36  /  ALT  17  /  AlkPhos  140[H]  09-20    Phosphorus: 3.2 mg/dL (09-13-24 @ 07:11)  Phosphorus: 3.5 mg/dL (07-26-24 @ 07:26)                          10.5   5.61  )-----------( 254      ( 22 Sep 2024 07:50 )             34.0     Mean Cell Volume: 93.4 fL (09-22-24 @ 07:50)

## 2024-09-22 NOTE — PROGRESS NOTE ADULT - SUBJECTIVE AND OBJECTIVE BOX
CHIEF COMPLAINT:    Patient is a 89y old  Female who presents with a chief complaint of chest pain/sob     INTERVAL HPI/OVERNIGHT EVENTS:    Patient seen and examined at bedside. No acute overnight events occurred.    ROS: Reports knee pain. All other systems are negative.    Medications:  Standing  apixaban 2.5 milliGRAM(s) Oral two times a day  atorvastatin 20 milliGRAM(s) Oral at bedtime  chlorhexidine 2% Cloths 1 Application(s) Topical daily  folic acid 1 milliGRAM(s) Oral daily  lidocaine   4% Patch 1 Patch Transdermal daily  metoprolol succinate  milliGRAM(s) Oral daily  sacubitril 49 mG/valsartan 51 mG 1 Tablet(s) Oral two times a day  sertraline 25 milliGRAM(s) Oral daily  timolol 0.5% Solution 1 Drop(s) Both EYES two times a day  vancomycin  IVPB 750 milliGRAM(s) IV Intermittent <User Schedule>    PRN Meds  acetaminophen     Tablet .. 650 milliGRAM(s) Oral every 6 hours PRN  acetaminophen     Tablet .. 650 milliGRAM(s) Oral every 6 hours PRN  traMADol 25 milliGRAM(s) Oral every 6 hours PRN        Vital Signs:    T(F): 97.2 (09-22-24 @ 14:00), Max: 98.5 (09-22-24 @ 04:54)  HR: 62 (09-22-24 @ 14:00) (62 - 71)  BP: 130/69 (09-22-24 @ 14:00) (130/69 - 142/80)  RR: 18 (09-22-24 @ 14:00) (18 - 18)  SpO2: 97% (09-22-24 @ 14:00) (97% - 98%)  I&O's Summary    21 Sep 2024 07:01  -  22 Sep 2024 07:00  --------------------------------------------------------  IN: 0 mL / OUT: 1000 mL / NET: -1000 mL      Daily     Daily   CAPILLARY BLOOD GLUCOSE          PHYSICAL EXAM:  GENERAL:  NAD  SKIN: No rashes or lesions  HEENT: Atraumatic. Normocephalic. Anicteric  NECK:  No JVD.   PULMONARY: Clear to ausculation bilaterally. No wheezing. No rales  CVS: Normal S1, S2. Regular rate and rhythm. No murmurs.  ABDOMEN/GI: Soft, Nontender, Nondistended; Bowel sounds are present  EXTREMITIES:  No edema B/L LE.  NEUROLOGIC:  No motor deficit.  PSYCH: Alert & oriented x 3, normal affect      LABS:                        10.5   5.61  )-----------( 254      ( 22 Sep 2024 07:50 )             34.0     09-22    137  |  99  |  22[H]  ----------------------------<  87  3.7   |  26  |  2.9[H]    Ca    7.9[L]      22 Sep 2024 07:50  Mg     2.0     09-22              Culture - Blood (collected 19 Sep 2024 22:15)  Source: .Blood Blood  Preliminary Report (22 Sep 2024 10:01):    No growth at 48 Hours        RADIOLOGY & ADDITIONAL TESTS:  Imaging or report Personally Reviewed:  [ ] YES  [ ] NO -->no new images        Consultant(s) Notes Reviewed:  [ ] YES  [ ] NO  Care Discussed with Consultants/Other Providers [ ] YES  [ ] NO    Case discussed with resident  Care discussed with pt

## 2024-09-22 NOTE — PROGRESS NOTE ADULT - ASSESSMENT
89-year-old female, past medical history hypertension, DVT/PE, heart failure, AICD in place, RA, end-stage renal disease on hemodialysis gets dialysis Tuesday Thursday Saturday admitted for acute hypoxemic respiratory failure. Nephrology consulted regarding HD  # ESRD on HD.   - Has right tunneled catheter  - s/p HD yesterday, next HD Tuesday  - on  vanco /  repeat BC 9/15 negative  / follow vanco level   - last Phos at target, not on binders   - h/h noted / no need for ELIJAH   - bp controlled   - ortho notes appreciated  will follow

## 2024-09-22 NOTE — PROGRESS NOTE ADULT - ASSESSMENT
88 yo F [employer] PMHx HTN, DVT/PE, NICM chronic with improved (LVEF 40-45% in 4/23) s/p AICD, RA, end-stage renal disease on hemodialysis gets dialysis Tuesday Thursday Saturday presents emergency department from dialysis for acute shortness of breath, was dx. with sepsis due to staph hominis bacteremia. To note patient was hospitalized 7/2024 for parainfluenza PNA      Sepsis, POA (Febrile 101.2 and tachycardic) improved   Suspected UTI-  Staph hominis bacteremia  CTAP 09/12 negative for acute pathology  Blood Cxs  09/12 - staph hominis and staph. epidermitis meth. resistant  MRSA PCR neg, repeated blood cxs neg prelim.  - ID  vancomycin 1g post HD x 14 days  - new onset fever 9/20, viral swab ordered, never sent by nursing staff. Pt afebrile since so can hold off  - f/u blood cultures, NGTD    Hyperglycemia  - had  on BMP yesterday  - check FS  - no history of DM II, suspect this was error    Hyponatremia  - s/o HD today   - f/u BMP in AM    Chronic  HFrEF   -Non ischemic Cardiomyopathy: s/p AICD  -Patient with SOB, cough,  -Continue Entresto and Metoprolol.     HIp and knee pain  - chronic, had previously improvmeent with prednisone  - needs outpatient rheum eval    ESRD on HD TTS  Continue HD.     HTN  Continue Entresto, Amlodipine and Metoprolol.     history PE/DVT  RA    DVT prophylaxis: Eliquis  GI prophylaxis: PPI    Code status: DNR/DNI     #Progress Note Handoff:  Pending (specify): placement, awaiting bed availability  Family discussion: as above with pt  Disposition: from SNF

## 2024-09-23 LAB
ANION GAP SERPL CALC-SCNC: 11 MMOL/L — SIGNIFICANT CHANGE UP (ref 7–14)
BASOPHILS # BLD AUTO: 0.06 K/UL — SIGNIFICANT CHANGE UP (ref 0–0.2)
BASOPHILS NFR BLD AUTO: 1.1 % — HIGH (ref 0–1)
BUN SERPL-MCNC: 34 MG/DL — HIGH (ref 10–20)
CALCIUM SERPL-MCNC: 8.2 MG/DL — LOW (ref 8.4–10.5)
CHLORIDE SERPL-SCNC: 90 MMOL/L — LOW (ref 98–110)
CO2 SERPL-SCNC: 25 MMOL/L — SIGNIFICANT CHANGE UP (ref 17–32)
CREAT SERPL-MCNC: 3.5 MG/DL — HIGH (ref 0.7–1.5)
EGFR: 12 ML/MIN/1.73M2 — LOW
EOSINOPHIL # BLD AUTO: 0.48 K/UL — SIGNIFICANT CHANGE UP (ref 0–0.7)
EOSINOPHIL NFR BLD AUTO: 8.6 % — HIGH (ref 0–8)
GLUCOSE SERPL-MCNC: 83 MG/DL — SIGNIFICANT CHANGE UP (ref 70–99)
HCT VFR BLD CALC: 34.4 % — LOW (ref 37–47)
HGB BLD-MCNC: 10.7 G/DL — LOW (ref 12–16)
IMM GRANULOCYTES NFR BLD AUTO: 0.4 % — HIGH (ref 0.1–0.3)
LYMPHOCYTES # BLD AUTO: 1.81 K/UL — SIGNIFICANT CHANGE UP (ref 1.2–3.4)
LYMPHOCYTES # BLD AUTO: 32.5 % — SIGNIFICANT CHANGE UP (ref 20.5–51.1)
MAGNESIUM SERPL-MCNC: 1.8 MG/DL — SIGNIFICANT CHANGE UP (ref 1.8–2.4)
MCHC RBC-ENTMCNC: 29.2 PG — SIGNIFICANT CHANGE UP (ref 27–31)
MCHC RBC-ENTMCNC: 31.1 G/DL — LOW (ref 32–37)
MCV RBC AUTO: 94 FL — SIGNIFICANT CHANGE UP (ref 81–99)
MONOCYTES # BLD AUTO: 0.63 K/UL — HIGH (ref 0.1–0.6)
MONOCYTES NFR BLD AUTO: 11.3 % — HIGH (ref 1.7–9.3)
NEUTROPHILS # BLD AUTO: 2.57 K/UL — SIGNIFICANT CHANGE UP (ref 1.4–6.5)
NEUTROPHILS NFR BLD AUTO: 46.1 % — SIGNIFICANT CHANGE UP (ref 42.2–75.2)
NRBC # BLD: 0 /100 WBCS — SIGNIFICANT CHANGE UP (ref 0–0)
PLATELET # BLD AUTO: 280 K/UL — SIGNIFICANT CHANGE UP (ref 130–400)
PMV BLD: 9.9 FL — SIGNIFICANT CHANGE UP (ref 7.4–10.4)
POTASSIUM SERPL-MCNC: 3.8 MMOL/L — SIGNIFICANT CHANGE UP (ref 3.5–5)
POTASSIUM SERPL-SCNC: 3.8 MMOL/L — SIGNIFICANT CHANGE UP (ref 3.5–5)
RBC # BLD: 3.66 M/UL — LOW (ref 4.2–5.4)
RBC # FLD: 15.9 % — HIGH (ref 11.5–14.5)
SODIUM SERPL-SCNC: 126 MMOL/L — LOW (ref 135–146)
WBC # BLD: 5.57 K/UL — SIGNIFICANT CHANGE UP (ref 4.8–10.8)
WBC # FLD AUTO: 5.57 K/UL — SIGNIFICANT CHANGE UP (ref 4.8–10.8)

## 2024-09-23 PROCEDURE — 99233 SBSQ HOSP IP/OBS HIGH 50: CPT

## 2024-09-23 RX ORDER — ACETAMINOPHEN 325 MG
2 TABLET ORAL
Qty: 0 | Refills: 0 | DISCHARGE
Start: 2024-09-23

## 2024-09-23 RX ORDER — TRAMADOL HYDROCHLORIDE 50 MG/1
50 TABLET, COATED ORAL EVERY 6 HOURS
Refills: 0 | Status: DISCONTINUED | OUTPATIENT
Start: 2024-09-23 | End: 2024-09-25

## 2024-09-23 RX ORDER — TRAMADOL HYDROCHLORIDE 50 MG/1
1 TABLET, COATED ORAL
Qty: 0 | Refills: 0 | DISCHARGE
Start: 2024-09-23

## 2024-09-23 RX ADMIN — LIDOCAINE 1 PATCH: 50 CREAM TOPICAL at 18:22

## 2024-09-23 RX ADMIN — Medication 1 DROP(S): at 18:11

## 2024-09-23 RX ADMIN — TRAMADOL HYDROCHLORIDE 50 MILLIGRAM(S): 50 TABLET, COATED ORAL at 15:09

## 2024-09-23 RX ADMIN — Medication 100 MILLIGRAM(S): at 06:50

## 2024-09-23 RX ADMIN — SACUBITRIL AND VALSARTAN 1 TABLET(S): 97; 103 TABLET, FILM COATED ORAL at 18:16

## 2024-09-23 RX ADMIN — APIXABAN 2.5 MILLIGRAM(S): 5 TABLET, FILM COATED ORAL at 06:50

## 2024-09-23 RX ADMIN — APIXABAN 2.5 MILLIGRAM(S): 5 TABLET, FILM COATED ORAL at 18:17

## 2024-09-23 RX ADMIN — LIDOCAINE 1 PATCH: 50 CREAM TOPICAL at 23:03

## 2024-09-23 RX ADMIN — SACUBITRIL AND VALSARTAN 1 TABLET(S): 97; 103 TABLET, FILM COATED ORAL at 06:50

## 2024-09-23 RX ADMIN — LIDOCAINE 1 PATCH: 50 CREAM TOPICAL at 18:21

## 2024-09-23 RX ADMIN — Medication 1 DROP(S): at 06:51

## 2024-09-23 RX ADMIN — ATORVASTATIN CALCIUM 20 MILLIGRAM(S): 10 TABLET, FILM COATED ORAL at 21:56

## 2024-09-23 RX ADMIN — TRAMADOL HYDROCHLORIDE 25 MILLIGRAM(S): 50 TABLET, COATED ORAL at 07:07

## 2024-09-23 RX ADMIN — TRAMADOL HYDROCHLORIDE 50 MILLIGRAM(S): 50 TABLET, COATED ORAL at 23:10

## 2024-09-23 RX ADMIN — FOLIC ACID 1 MILLIGRAM(S): 1 TABLET ORAL at 11:36

## 2024-09-23 RX ADMIN — LIDOCAINE 1 PATCH: 50 CREAM TOPICAL at 11:37

## 2024-09-23 RX ADMIN — SERTRALINE HYDROCHLORIDE 25 MILLIGRAM(S): 100 TABLET, FILM COATED ORAL at 11:36

## 2024-09-23 RX ADMIN — CHLORHEXIDINE GLUCONATE ORAL RINSE 1 APPLICATION(S): 1.2 SOLUTION DENTAL at 11:34

## 2024-09-23 RX ADMIN — TRAMADOL HYDROCHLORIDE 50 MILLIGRAM(S): 50 TABLET, COATED ORAL at 16:18

## 2024-09-23 NOTE — PROGRESS NOTE ADULT - ASSESSMENT
Patient is an 89-year-old female, past medical history hypertension, DVT/PE, heart failure, AICD in place, RA, end-stage renal disease on hemodialysis gets dialysis Tuesday Thursday Saturday presents emergency department from dialysis for acute shortness of breath, was dx. with sepsis due to staph hominis bacteremia.     #Sepsis, POA (Febrile 101.2 and tachycardic) improved   # Suspected UTI-  # Staph hominis bacteremia  CTAP 09/12 negative for acute pathology  Blood Cxs  09/12 - staph hominis and staph. epidermitis meth. resistant  MRSA PCR neg, repeated blood cxs neg prelim.  - ID vancomycin 1g post HD x 14 days  - new onset fever 9/20  - f/u blood cultures, NGTD  - per ID: on dc 750mg post HD to be given at dialysis via TDC x 14 days to end 9/26    #hip pain  #hx of hip fracture  - bilateral hip xrays taken  - evaluated by ortho: no intervention at this time  - tylenol and tramadol PRN for pain  - OP rheum eval    #Acute Hypoxic Respiratory Failure, resolved  CXR 09/12 showing pulmonary edema/interstitial opacities  - fluid removal via HD    #ESRD on HD TTS - HD per nephrology  - c/w HD    #HTN  #Chronic HFrEF 25-30%  #HO PE/DVT  #H/o NSVT   - Echo: (7/23) EF of 25 to 30%  - c/w Entresto   - c/w atorvastatin 20 mg qhs  - c/w metoprolol  mg qd  - c/w Eliquis 2.5 mg bid    # Macrocytic anemia - stable h/h, obtain Vit B12, folate levels    DVT PPX, Eliquis    Code status: DNR/DNI    Pending: placement

## 2024-09-23 NOTE — PROGRESS NOTE ADULT - ASSESSMENT
89-year-old female, past medical history hypertension, DVT/PE, heart failure, AICD in place, RA, end-stage renal disease on hemodialysis gets dialysis Tuesday Thursday Saturday admitted for acute hypoxemic respiratory failure. Nephrology consulted regarding HD  # ESRD on HD.   - Has right tunneled catheter  - s/p HD saturday , next HD Tuesday  - on  vanco /  repeat BC 9/15 negative  / follow vanco level   - last Phos at target, not on binders   - h/h noted / no need for ELIJAH   - bp controlled   will follow  ? d/c plan

## 2024-09-23 NOTE — PROGRESS NOTE ADULT - SUBJECTIVE AND OBJECTIVE BOX
RACHEL SUMMERS 89y Female  MRN#: [id number]   Hospital Day: 11d    SUBJECTIVE  Patient is a 89y old Female who presents with a chief complaint of chest pain/sob (23 Sep 2024 08:58)  Currently admitted to medicine with the primary diagnosis of Other specified sepsis      INTERVAL HPI AND OVERNIGHT EVENTS:  Patient was examined and seen at bedside. This morning she is resting comfortably in bed and reports no issues or overnight events.  c/o chronic pain, denies chest pain , sob, n/v/d/c,    REVIEW OF SYMPTOMS:  10 point ROS negative except as above.    OBJECTIVE  PAST MEDICAL & SURGICAL HISTORY  Hypertension    Gout    Diverticulitis  sigmoid    Rheumatoid arthritis    DVT, lower extremity  Bilateral    Pulmonary embolism    Chronic HFrEF (heart failure with reduced ejection fraction)    AICD (automatic cardioverter/defibrillator) present    ESRD on dialysis    Artificial pacemaker    History of appendectomy    History of tonsillectomy    History of hysterectomy    H/O hernia repair      ALLERGIES:  Keflex (Swelling)  cephalosporins (Angioedema)    MEDICATIONS:  STANDING MEDICATIONS  apixaban 2.5 milliGRAM(s) Oral two times a day  atorvastatin 20 milliGRAM(s) Oral at bedtime  chlorhexidine 2% Cloths 1 Application(s) Topical daily  folic acid 1 milliGRAM(s) Oral daily  lidocaine   4% Patch 1 Patch Transdermal daily  lidocaine   4% Patch 1 Patch Transdermal daily  metoprolol succinate  milliGRAM(s) Oral daily  sacubitril 49 mG/valsartan 51 mG 1 Tablet(s) Oral two times a day  sertraline 25 milliGRAM(s) Oral daily  timolol 0.5% Solution 1 Drop(s) Both EYES two times a day  vancomycin  IVPB 750 milliGRAM(s) IV Intermittent <User Schedule>    PRN MEDICATIONS  acetaminophen     Tablet .. 650 milliGRAM(s) Oral every 6 hours PRN  acetaminophen     Tablet .. 650 milliGRAM(s) Oral every 6 hours PRN  traMADol 50 milliGRAM(s) Oral every 6 hours PRN      VITAL SIGNS: Last 24 Hours  T(C): 36.7 (23 Sep 2024 06:39), Max: 36.8 (22 Sep 2024 19:17)  T(F): 98 (23 Sep 2024 06:39), Max: 98.2 (22 Sep 2024 19:17)  HR: 73 (23 Sep 2024 06:50) (62 - 75)  BP: 156/79 (23 Sep 2024 06:50) (130/65 - 156/79)  BP(mean): 87 (22 Sep 2024 17:55) (87 - 89)  RR: 18 (23 Sep 2024 06:39) (18 - 18)  SpO2: 97% (23 Sep 2024 06:50) (97% - 98%)    PHYSICAL EXAM:  GENERAL: NAD,   HEENT - NC/AT,   NECK: Supple,   CHEST/LUNG: b/l air entry   HEART: Regular rate and rhythm; No murmurs,   ABDOMEN: Soft, Nontender, Nondistended; Bowel sounds present  EXTREMITIES: no edema  NEURO:  aox2-3, generalized weakness   SKIN: No rashes or lesions    LABS:                        10.7   5.57  )-----------( 280      ( 23 Sep 2024 08:02 )             34.4     09-23    126[L]  |  90[L]  |  34[H]  ----------------------------<  83  3.8   |  25  |  3.5[H]    Ca    8.2[L]      23 Sep 2024 08:02  Mg     1.8     09-23        Urinalysis Basic - ( 23 Sep 2024 08:02 )    Color: x / Appearance: x / SG: x / pH: x  Gluc: 83 mg/dL / Ketone: x  / Bili: x / Urobili: x   Blood: x / Protein: x / Nitrite: x   Leuk Esterase: x / RBC: x / WBC x   Sq Epi: x / Non Sq Epi: x / Bacteria: x      RADIOLOGY:  < from: Xray Chest 1 View-PORTABLE IMMEDIATE (09.12.24 @ 18:05) >  IMPRESSION:    1. Pulmonary edema/interstitial opacities.  2. Small right pleural effusion/basilar opacity.    < end of copied text >  < from: CT Abdomen and Pelvis No Cont (09.12.24 @ 19:36) >    IMPRESSION:  No CT evidence of acute intra-abdominal pathology    < end of copied text >  < from: US Abdomen Upper Quadrant Right (09.15.24 @ 11:15) >    IMPRESSION:  Surgically absent gallbladder. Right pleural effusion.    < end of copied text >  < from: Xray Hip 2-3 Views, Left (09.19.24 @ 10:41) >  No acute fracture or dislocation. Left femoral long gamma nail fixation   of the intertrochanteric fracture, with increased callus formation   compared to prior exam consistent with mild interval healing. No evidence   of hardware complication. Moderate osteoarthritis of the bilateral hips.   Degenerative changes of the sacroiliac joints and lower lumbar spine.    Soft tissue swelling of the right lateral thigh.    < end of copied text >  < from: Xray Hip 2-3 Views, Right (09.19.24 @ 14:17) >  impression:    Again seen is patient post gamma nail fixation of the left femoral   intertrochanteric fracture, in anatomic alignment.    Moderate osteoarthritis of the bilateral hips. Additional osteoarthritis   of the sacroiliac joints and pubic symphysis. Soft tissue swelling/edema   at the bilateral lateral thigh regio    < end of copied text >  < from: TTE Echo Complete w/o Contrast w/ Doppler (09.18.24 @ 10:04) >    Summary:   1. Severely decreased global left ventricular systolic function.   2. LV Ejection Fraction by Begum's Method with a biplane EF of 28 %.   3. Spectral Doppler shows pseudonormal pattern of left ventricular   myocardial filling (Grade II diastolic dysfunction). Elevated mean left   atrial pressure.   4. Mildly reduced RV systolic function.   5. Severely enlarged left atrium.   6. Severely enlarged right atrium.   7. Sclerotic aortic valve with normal opening.   8. Mild mitral valve regurgitation.   9. Moderate tricuspid regurgitation.  10. Mild pulmonic valve regurgitation.  11. Estimated pulmonary artery systolic pressure is 59.3 mmHg assuming a   right atrial pressure of 8 mmHg, which is consistent with moderate   pulmonary hypertension.    < end of copied text >

## 2024-09-23 NOTE — PROGRESS NOTE ADULT - SUBJECTIVE AND OBJECTIVE BOX
SUBJECTIVE/OVERNIGHT EVENTS  Today is hospital day 11d. This morning patient was seen and examined at bedside, resting comfortably in bed. No acute or major events overnight.    MEDICATIONS  STANDING MEDICATIONS  apixaban 2.5 milliGRAM(s) Oral two times a day  atorvastatin 20 milliGRAM(s) Oral at bedtime  chlorhexidine 2% Cloths 1 Application(s) Topical daily  folic acid 1 milliGRAM(s) Oral daily  lidocaine   4% Patch 1 Patch Transdermal daily  lidocaine   4% Patch 1 Patch Transdermal daily  metoprolol succinate  milliGRAM(s) Oral daily  sacubitril 49 mG/valsartan 51 mG 1 Tablet(s) Oral two times a day  sertraline 25 milliGRAM(s) Oral daily  timolol 0.5% Solution 1 Drop(s) Both EYES two times a day  vancomycin  IVPB 750 milliGRAM(s) IV Intermittent <User Schedule>    PRN MEDICATIONS  acetaminophen     Tablet .. 650 milliGRAM(s) Oral every 6 hours PRN  acetaminophen     Tablet .. 650 milliGRAM(s) Oral every 6 hours PRN  traMADol 50 milliGRAM(s) Oral every 6 hours PRN    VITALS  T(F): 98.4 (09-23-24 @ 13:00), Max: 98.4 (09-23-24 @ 13:00)  HR: 60 (09-23-24 @ 13:00) (60 - 75)  BP: 136/65 (09-23-24 @ 13:00) (130/65 - 156/79)  RR: 18 (09-23-24 @ 13:00) (18 - 18)  SpO2: 97% (09-23-24 @ 06:50) (97% - 98%)    PHYSICAL EXAM  GENERAL: NAD  HEENT: NCAT, PERRL  HEART: RRR, normal S1,S2  LUNGS: CTAB  ABDOMEN: soft, nontender, nondistended  NEURO: AOx3    LABS             10.7   5.57  )-----------( 280      ( 09-23-24 @ 08:02 )             34.4     126  |  90  |  34  -------------------------<  83   09-23-24 @ 08:02  3.8  |  25  |  3.5    Ca      8.2     09-23-24 @ 08:02  Mg     1.8     09-23-24 @ 08:02          Urinalysis Basic - ( 23 Sep 2024 08:02 )    Color: x / Appearance: x / SG: x / pH: x  Gluc: 83 mg/dL / Ketone: x  / Bili: x / Urobili: x   Blood: x / Protein: x / Nitrite: x   Leuk Esterase: x / RBC: x / WBC x   Sq Epi: x / Non Sq Epi: x / Bacteria: x          IMAGING

## 2024-09-23 NOTE — PROGRESS NOTE ADULT - SUBJECTIVE AND OBJECTIVE BOX
seen and examined  24 h events noted  no distress         PAST HISTORY  --------------------------------------------------------------------------------  No significant changes to PMH, PSH, FHx, SHx, unless otherwise noted    ALLERGIES & MEDICATIONS  --------------------------------------------------------------------------------  Allergies    Keflex (Swelling)  cephalosporins (Angioedema)    Intolerances      Standing Inpatient Medications  apixaban 2.5 milliGRAM(s) Oral two times a day  atorvastatin 20 milliGRAM(s) Oral at bedtime  chlorhexidine 2% Cloths 1 Application(s) Topical daily  folic acid 1 milliGRAM(s) Oral daily  lidocaine   4% Patch 1 Patch Transdermal daily  lidocaine   4% Patch 1 Patch Transdermal daily  metoprolol succinate  milliGRAM(s) Oral daily  sacubitril 49 mG/valsartan 51 mG 1 Tablet(s) Oral two times a day  sertraline 25 milliGRAM(s) Oral daily  timolol 0.5% Solution 1 Drop(s) Both EYES two times a day  vancomycin  IVPB 750 milliGRAM(s) IV Intermittent <User Schedule>    PRN Inpatient Medications  acetaminophen     Tablet .. 650 milliGRAM(s) Oral every 6 hours PRN  acetaminophen     Tablet .. 650 milliGRAM(s) Oral every 6 hours PRN  traMADol 25 milliGRAM(s) Oral every 6 hours PRN      VITALS/PHYSICAL EXAM  --------------------------------------------------------------------------------  T(C): 36.7 (09-23-24 @ 06:39), Max: 36.8 (09-22-24 @ 19:17)  HR: 73 (09-23-24 @ 06:50) (62 - 75)  BP: 156/79 (09-23-24 @ 06:50) (130/65 - 156/79)  RR: 18 (09-23-24 @ 06:39) (18 - 18)  SpO2: 97% (09-23-24 @ 06:50) (97% - 98%)  Wt(kg): --        Physical Exam:  	Gen: NAD,  	Pulm: decrease BS  B/L  	CV: RRR, S1S2; no rub  	LE:  no edema  	Vascular access:tdc    LABS/STUDIES  --------------------------------------------------------------------------------              10.5   5.61  >-----------<  254      [09-22-24 @ 07:50]              34.0     137  |  99  |  22  ----------------------------<  87      [09-22-24 @ 07:50]  3.7   |  26  |  2.9        Ca     7.9     [09-22-24 @ 07:50]      Mg     2.0     [09-22-24 @ 07:50]    Creatinine Trend:  SCr 2.9 [09-22 @ 07:50]  SCr 2.3 [09-21 @ 11:00]  SCr 3.2 [09-20 @ 12:04]  SCr 2.7 [09-18 @ 06:18]  SCr 3.7 [09-17 @ 06:46]    Urinalysis - [09-22-24 @ 07:50]      Color  / Appearance  / SG  / pH       Gluc 87 / Ketone   / Bili  / Urobili        Blood  / Protein  / Leuk Est  / Nitrite       RBC  / WBC  / Hyaline  / Gran  / Sq Epi  / Non Sq Epi  / Bacteria       Iron 33, TIBC 147, %sat 22      [04-04-24 @ 06:44]  Ferritin 785      [04-04-24 @ 06:44]  PTH -- (Ca 8.2)      [04-09-24 @ 05:43]   35  PTH -- (Ca 8.2)      [03-24-24 @ 20:00]   138  Vitamin D (25OH) 11      [04-09-24 @ 05:43]  Lipid: chol 144, , HDL 39, LDL --      [03-22-24 @ 06:14]

## 2024-09-23 NOTE — PROGRESS NOTE ADULT - ASSESSMENT
a 88 yo F [employer] PMHx HTN, DVT/PE, NICM chronic with improved (LV EF of 28 % in 9/24) s/p AICD, RA, end-stage renal disease on hemodialysis gets dialysis Tuesday Thursday Saturday presents emergency department from dialysis for acute shortness of breath, was dx. with sepsis due to staph hominis bacteremia. To note patient was hospitalized 7/2024 for parainfluenza PNA      Sepsis, POA (Febrile 101.2 and tachycardic) improved   Suspected UTI-  Staph hominis bacteremia  CTAP 09/12 negative for acute pathology  Blood Cxs  09/12 - staph hominis and staph. epidermitis meth. resistant  MRSA PCR neg, repeated blood cxs neg prelim.  - ID  vancomycin 1g post HD x 14 days  - new onset fever 9/20, viral swab ordered, never sent by nursing staff. Pt afebrile since so can hold off  - blood cultures, NGTD  - ID for dc recs     Hyperglycemia  - had  on BMP yesterday  - no history of DM II, suspect this was error  - check FS    Hyponatremia  - s/o HD today   - BMP as needed    Chronic  HFrEF   -Non ischemic Cardiomyopathy: s/p AICD  -Patient with SOB, cough,  -Continue Entresto and Metoprolol.     HIp and knee pain  - chronic, had previously improvement with prednisone  - needs outpatient rheum eval    ESRD on HD TTS  Continue HD.     HTN  Continue Entresto, Amlodipine and Metoprolol.     history PE/DVT  RA    DVT prophylaxis: Eliquis  GI prophylaxis: PPI    Code status: DNR/DNI     #Progress Note Handoff:  Pending (specify): placement, awaiting bed availability   No

## 2024-09-24 PROCEDURE — 99231 SBSQ HOSP IP/OBS SF/LOW 25: CPT

## 2024-09-24 RX ADMIN — TRAMADOL HYDROCHLORIDE 50 MILLIGRAM(S): 50 TABLET, COATED ORAL at 00:10

## 2024-09-24 RX ADMIN — Medication 1 DROP(S): at 17:39

## 2024-09-24 RX ADMIN — ATORVASTATIN CALCIUM 20 MILLIGRAM(S): 10 TABLET, FILM COATED ORAL at 23:15

## 2024-09-24 RX ADMIN — LIDOCAINE 1 PATCH: 50 CREAM TOPICAL at 18:16

## 2024-09-24 RX ADMIN — SACUBITRIL AND VALSARTAN 1 TABLET(S): 97; 103 TABLET, FILM COATED ORAL at 17:39

## 2024-09-24 RX ADMIN — SACUBITRIL AND VALSARTAN 1 TABLET(S): 97; 103 TABLET, FILM COATED ORAL at 06:45

## 2024-09-24 RX ADMIN — TRAMADOL HYDROCHLORIDE 50 MILLIGRAM(S): 50 TABLET, COATED ORAL at 19:48

## 2024-09-24 RX ADMIN — Medication 1 DROP(S): at 06:45

## 2024-09-24 RX ADMIN — LIDOCAINE 1 PATCH: 50 CREAM TOPICAL at 13:05

## 2024-09-24 RX ADMIN — SERTRALINE HYDROCHLORIDE 25 MILLIGRAM(S): 100 TABLET, FILM COATED ORAL at 13:04

## 2024-09-24 RX ADMIN — FOLIC ACID 1 MILLIGRAM(S): 1 TABLET ORAL at 13:04

## 2024-09-24 RX ADMIN — CHLORHEXIDINE GLUCONATE ORAL RINSE 1 APPLICATION(S): 1.2 SOLUTION DENTAL at 13:09

## 2024-09-24 RX ADMIN — APIXABAN 2.5 MILLIGRAM(S): 5 TABLET, FILM COATED ORAL at 17:39

## 2024-09-24 RX ADMIN — VANCOMYCIN HCL-SODIUM CHLORIDE IV SOLN 1.5 GM/250ML-0.9% 250 MILLIGRAM(S): 1.5-0.9/25 SOLUTION at 17:39

## 2024-09-24 RX ADMIN — APIXABAN 2.5 MILLIGRAM(S): 5 TABLET, FILM COATED ORAL at 06:45

## 2024-09-24 RX ADMIN — TRAMADOL HYDROCHLORIDE 50 MILLIGRAM(S): 50 TABLET, COATED ORAL at 20:48

## 2024-09-24 NOTE — PROGRESS NOTE ADULT - SUBJECTIVE AND OBJECTIVE BOX
NAVI FOLLOW UP NOTE  --------------------------------------------------------------------------------  Chief Complaint:    24 hour events/subjective:        PAST HISTORY  --------------------------------------------------------------------------------  No significant changes to PMH, PSH, FHx, SHx, unless otherwise noted    ALLERGIES & MEDICATIONS  --------------------------------------------------------------------------------  Allergies    Keflex (Swelling)  cephalosporins (Angioedema)    Intolerances      Standing Inpatient Medications  apixaban 2.5 milliGRAM(s) Oral two times a day  atorvastatin 20 milliGRAM(s) Oral at bedtime  chlorhexidine 2% Cloths 1 Application(s) Topical daily  folic acid 1 milliGRAM(s) Oral daily  lidocaine   4% Patch 1 Patch Transdermal daily  lidocaine   4% Patch 1 Patch Transdermal daily  metoprolol succinate  milliGRAM(s) Oral daily  sacubitril 49 mG/valsartan 51 mG 1 Tablet(s) Oral two times a day  sertraline 25 milliGRAM(s) Oral daily  timolol 0.5% Solution 1 Drop(s) Both EYES two times a day  vancomycin  IVPB 750 milliGRAM(s) IV Intermittent <User Schedule>    PRN Inpatient Medications  acetaminophen     Tablet .. 650 milliGRAM(s) Oral every 6 hours PRN  acetaminophen     Tablet .. 650 milliGRAM(s) Oral every 6 hours PRN  traMADol 50 milliGRAM(s) Oral every 6 hours PRN      REVIEW OF SYSTEMS  --------------------------------------------------------------------------------  Gen: No weight changes, fatigue, fevers/chills, weakness  Skin: No rashes  Head/Eyes/Ears/Mouth: No headache; Normal hearing; Normal vision w/o blurriness; No sinus pain/discomfort, sore throat  Respiratory: No dyspnea, cough, wheezing, hemoptysis  CV: No chest pain, PND, orthopnea  GI: No abdominal pain, diarrhea, constipation, nausea, vomiting, melena, hematochezia  : No increased frequency, dysuria, hematuria, nocturia  MSK: No joint pain/swelling; no back pain; no edema  Neuro: No dizziness/lightheadedness, weakness, seizures, numbness, tingling  Heme: No easy bruising or bleeding  Endo: No heat/cold intolerance  Psych: No significant nervousness, anxiety, stress, depression    All other systems were reviewed and are negative, except as noted.    VITALS/PHYSICAL EXAM  --------------------------------------------------------------------------------  T(C): 36.6 (09-24-24 @ 04:47), Max: 36.9 (09-23-24 @ 13:00)  HR: 60 (09-24-24 @ 08:30) (59 - 60)  BP: 138/73 (09-24-24 @ 08:30) (129/72 - 138/73)  RR: 18 (09-24-24 @ 08:30) (18 - 18)  SpO2: --  Wt(kg): --        Physical Exam:  	Gen: NAD, well-appearing  	HEENT: PERRL, supple neck, clear oropharynx  	Pulm: CTA B/L  	CV: RRR, S1S2; no rub  	Back: No spinal or CVA tenderness; no sacral edema  	Abd: +BS, soft, nontender/nondistended  	: No suprapubic tenderness  	UE: Warm, FROM, no clubbing, intact strength; no edema; no asterixis  	LE: Warm, FROM, no clubbing, intact strength; no edema  	Neuro: No focal deficits, intact gait  	Psych: Normal affect and mood  	Skin: Warm, without rashes  	Vascular access:    LABS/STUDIES  --------------------------------------------------------------------------------              10.7   5.57  >-----------<  280      [09-23-24 @ 08:02]              34.4     126  |  90  |  34  ----------------------------<  83      [09-23-24 @ 08:02]  3.8   |  25  |  3.5        Ca     8.2     [09-23-24 @ 08:02]      Mg     1.8     [09-23-24 @ 08:02]            Creatinine Trend:  SCr 3.5 [09-23 @ 08:02]  SCr 2.9 [09-22 @ 07:50]  SCr 2.3 [09-21 @ 11:00]  SCr 3.2 [09-20 @ 12:04]  SCr 2.7 [09-18 @ 06:18]    Urinalysis - [09-23-24 @ 08:02]      Color  / Appearance  / SG  / pH       Gluc 83 / Ketone   / Bili  / Urobili        Blood  / Protein  / Leuk Est  / Nitrite       RBC  / WBC  / Hyaline  / Gran  / Sq Epi  / Non Sq Epi  / Bacteria       Iron 33, TIBC 147, %sat 22      [04-04-24 @ 06:44]  Ferritin 785      [04-04-24 @ 06:44]  PTH -- (Ca 8.2)      [04-09-24 @ 05:43]   35  PTH -- (Ca 8.2)      [03-24-24 @ 20:00]   138  Vitamin D (25OH) 11      [04-09-24 @ 05:43]  Lipid: chol 144, , HDL 39, LDL --      [03-22-24 @ 06:14]       seen and examined  24 h events noted   no distress         PAST HISTORY  --------------------------------------------------------------------------------  No significant changes to PMH, PSH, FHx, SHx, unless otherwise noted    ALLERGIES & MEDICATIONS  --------------------------------------------------------------------------------  Allergies    Keflex (Swelling)  cephalosporins (Angioedema)    Intolerances      Standing Inpatient Medications  apixaban 2.5 milliGRAM(s) Oral two times a day  atorvastatin 20 milliGRAM(s) Oral at bedtime  chlorhexidine 2% Cloths 1 Application(s) Topical daily  folic acid 1 milliGRAM(s) Oral daily  lidocaine   4% Patch 1 Patch Transdermal daily  lidocaine   4% Patch 1 Patch Transdermal daily  metoprolol succinate  milliGRAM(s) Oral daily  sacubitril 49 mG/valsartan 51 mG 1 Tablet(s) Oral two times a day  sertraline 25 milliGRAM(s) Oral daily  timolol 0.5% Solution 1 Drop(s) Both EYES two times a day  vancomycin  IVPB 750 milliGRAM(s) IV Intermittent <User Schedule>    PRN Inpatient Medications  acetaminophen     Tablet .. 650 milliGRAM(s) Oral every 6 hours PRN  acetaminophen     Tablet .. 650 milliGRAM(s) Oral every 6 hours PRN  traMADol 50 milliGRAM(s) Oral every 6 hours PRN          VITALS/PHYSICAL EXAM  --------------------------------------------------------------------------------  T(C): 36.6 (09-24-24 @ 04:47), Max: 36.9 (09-23-24 @ 13:00)  HR: 60 (09-24-24 @ 08:30) (59 - 60)  BP: 138/73 (09-24-24 @ 08:30) (129/72 - 138/73)  RR: 18 (09-24-24 @ 08:30) (18 - 18)  SpO2: --  Wt(kg): --        Physical Exam:  	Gen: NAD  	Pulm: CTA B/L  	CV: S1S2; no rub  	Abd: +distended  	Vascular access:tdc     LABS/STUDIES  --------------------------------------------------------------------------------              10.7   5.57  >-----------<  280      [09-23-24 @ 08:02]              34.4     126  |  90  |  34  ----------------------------<  83      [09-23-24 @ 08:02]  3.8   |  25  |  3.5        Ca     8.2     [09-23-24 @ 08:02]      Mg     1.8     [09-23-24 @ 08:02]            Creatinine Trend:  SCr 3.5 [09-23 @ 08:02]  SCr 2.9 [09-22 @ 07:50]  SCr 2.3 [09-21 @ 11:00]  SCr 3.2 [09-20 @ 12:04]  SCr 2.7 [09-18 @ 06:18]    Urinalysis - [09-23-24 @ 08:02]      Color  / Appearance  / SG  / pH       Gluc 83 / Ketone   / Bili  / Urobili        Blood  / Protein  / Leuk Est  / Nitrite       RBC  / WBC  / Hyaline  / Gran  / Sq Epi  / Non Sq Epi  / Bacteria       Iron 33, TIBC 147, %sat 22      [04-04-24 @ 06:44]  Ferritin 785      [04-04-24 @ 06:44]  PTH -- (Ca 8.2)      [04-09-24 @ 05:43]   35  PTH -- (Ca 8.2)      [03-24-24 @ 20:00]   138  Vitamin D (25OH) 11      [04-09-24 @ 05:43]  Lipid: chol 144, , HDL 39, LDL --      [03-22-24 @ 06:14]

## 2024-09-24 NOTE — PROGRESS NOTE ADULT - SUBJECTIVE AND OBJECTIVE BOX
SUBJECTIVE/OVERNIGHT EVENTS  Today is hospital day 12d. This morning patient was seen and examined at bedside, resting comfortably in bed. No acute or major events overnight.    MEDICATIONS  STANDING MEDICATIONS  apixaban 2.5 milliGRAM(s) Oral two times a day  atorvastatin 20 milliGRAM(s) Oral at bedtime  chlorhexidine 2% Cloths 1 Application(s) Topical daily  folic acid 1 milliGRAM(s) Oral daily  lidocaine   4% Patch 1 Patch Transdermal daily  lidocaine   4% Patch 1 Patch Transdermal daily  metoprolol succinate  milliGRAM(s) Oral daily  sacubitril 49 mG/valsartan 51 mG 1 Tablet(s) Oral two times a day  sertraline 25 milliGRAM(s) Oral daily  timolol 0.5% Solution 1 Drop(s) Both EYES two times a day  vancomycin  IVPB 750 milliGRAM(s) IV Intermittent <User Schedule>    PRN MEDICATIONS  acetaminophen     Tablet .. 650 milliGRAM(s) Oral every 6 hours PRN  acetaminophen     Tablet .. 650 milliGRAM(s) Oral every 6 hours PRN  traMADol 50 milliGRAM(s) Oral every 6 hours PRN    VITALS  T(F): 97.5 (09-24-24 @ 13:37), Max: 98.2 (09-23-24 @ 19:57)  HR: 74 (09-24-24 @ 13:37) (59 - 74)  BP: 136/75 (09-24-24 @ 13:37) (129/72 - 171/89)  RR: 18 (09-24-24 @ 13:37) (18 - 18)  SpO2: 95% (09-24-24 @ 13:37) (95% - 95%)    PHYSICAL EXAM  GENERAL: NAD  HEENT: NCAT, PERRL  HEART: RRR, normal S1,S2  LUNGS: CTAB  ABDOMEN: soft, nontender, nondistended  NEURO: AOx3    LABS             10.7   5.57  )-----------( 280      ( 09-23-24 @ 08:02 )             34.4     126  |  90  |  34  -------------------------<  83   09-23-24 @ 08:02  3.8  |  25  |  3.5    Ca      8.2     09-23-24 @ 08:02  Mg     1.8     09-23-24 @ 08:02          Urinalysis Basic - ( 23 Sep 2024 08:02 )    Color: x / Appearance: x / SG: x / pH: x  Gluc: 83 mg/dL / Ketone: x  / Bili: x / Urobili: x   Blood: x / Protein: x / Nitrite: x   Leuk Esterase: x / RBC: x / WBC x   Sq Epi: x / Non Sq Epi: x / Bacteria: x          IMAGING

## 2024-09-24 NOTE — PROGRESS NOTE ADULT - ASSESSMENT
89-year-old female, past medical history hypertension, DVT/PE, heart failure, AICD in place, RA, end-stage renal disease on hemodialysis gets dialysis Tuesday Thursday Saturday admitted for acute hypoxemic respiratory failure. Nephrology consulted regarding HD  # ESRD on HD.   - Has right tunneled catheter  - hd today 3 k bath uf 1liter as tolerated   - on  vanco /  repeat BC 9/15 negative  / follow vanco level   - last Phos at target, not on binders   - h/h noted / no need for ELIJAH   - bp controlled   will follow  ? d/c plan

## 2024-09-24 NOTE — PROGRESS NOTE ADULT - SUBJECTIVE AND OBJECTIVE BOX
Patient seen and examined. Events over the last 24 hours noted.   Pt seen during dialysis, pt c/o generalized fatigue, no other complaints     T(F): 97.5 (09-24-24 @ 13:37), Max: 98.2 (09-23-24 @ 19:57)  HR: 74 (09-24-24 @ 13:37)  BP: 136/75 (09-24-24 @ 13:37)  RR: 18 (09-24-24 @ 13:37)  SpO2: 95% (09-24-24 @ 13:37) (95% - 95%)    PHYSICAL EXAM:  GEN: No acute distress  HEENT: normocephalic, atraumatic, anicteric  LUNGS: b/l breath sounds present, clear to auscultation bilaterally   HEART: S1/S2 present. RRR  ABD: Soft, non-tender, non-distended. Bowel sounds present  EXT: warm   NEURO: awake, no new focal deficits    LABS  09-23    126[L]  |  90[L]  |  34[H]  ----------------------------<  83  3.8   |  25  |  3.5[H]    Ca    8.2[L]      23 Sep 2024 08:02  Mg     1.8     09-23                            10.7   5.57  )-----------( 280      ( 23 Sep 2024 08:02 )             34.4            MEDICATIONS  (STANDING):  apixaban 2.5 milliGRAM(s) Oral two times a day  atorvastatin 20 milliGRAM(s) Oral at bedtime  chlorhexidine 2% Cloths 1 Application(s) Topical daily  folic acid 1 milliGRAM(s) Oral daily  lidocaine   4% Patch 1 Patch Transdermal daily  lidocaine   4% Patch 1 Patch Transdermal daily  metoprolol succinate  milliGRAM(s) Oral daily  sacubitril 49 mG/valsartan 51 mG 1 Tablet(s) Oral two times a day  sertraline 25 milliGRAM(s) Oral daily  timolol 0.5% Solution 1 Drop(s) Both EYES two times a day  vancomycin  IVPB 750 milliGRAM(s) IV Intermittent <User Schedule>    MEDICATIONS  (PRN):  acetaminophen     Tablet .. 650 milliGRAM(s) Oral every 6 hours PRN Temp greater or equal to 38C (100.4F), Mild Pain (1 - 3), Moderate Pain (4 - 6)  acetaminophen     Tablet .. 650 milliGRAM(s) Oral every 6 hours PRN Mild Pain (1 - 3)  traMADol 50 milliGRAM(s) Oral every 6 hours PRN Moderate Pain (4 - 6)

## 2024-09-24 NOTE — PROGRESS NOTE ADULT - ASSESSMENT
Patient is an 89-year-old female, past medical history hypertension, DVT/PE, heart failure, AICD in place, RA, end-stage renal disease on hemodialysis gets dialysis Tuesday Thursday Saturday presents emergency department from dialysis for acute shortness of breath, was dx. with sepsis due to staph hominis bacteremia.     #Sepsis, POA (Febrile 101.2 and tachycardic) improved   # Suspected UTI-  # Staph hominis bacteremia  CTAP 09/12 negative for acute pathology  Blood Cxs  09/12 - staph hominis and staph. epidermitis meth. resistant  MRSA PCR neg, repeated blood cxs neg prelim.  - ID vancomycin 1g post HD x 14 days  - new onset fever 9/20  - f/u blood cultures, NGTD  - per ID: on dc 750mg post HD to be given at dialysis via TDC x 14 days to end 9/26  - 9/24 HD today w IV vanco    #hip pain  #hx of hip fracture  - bilateral hip xrays taken  - evaluated by ortho: no intervention at this time  - tylenol and tramadol PRN for pain  - OP rheum eval    #Acute Hypoxic Respiratory Failure, resolved  CXR 09/12 showing pulmonary edema/interstitial opacities  - fluid removal via HD    #ESRD on HD TTS - HD per nephrology  - c/w HD    #HTN  #Chronic HFrEF 25-30%  #HO PE/DVT  #H/o NSVT   - Echo: (7/23) EF of 25 to 30%  - c/w Entresto   - c/w atorvastatin 20 mg qhs  - c/w metoprolol  mg qd  - c/w Eliquis 2.5 mg bid    # Macrocytic anemia - stable h/h, obtain Vit B12, folate levels    DVT PPX, Eliquis    Code status: DNR/DNI    Pending: Medically cleared (last dose of vanco on 9/26 can be done at outpt dialysis center), pending placement

## 2024-09-24 NOTE — PROGRESS NOTE ADULT - ASSESSMENT
Assessment    Patient is an 89-year-old female, past medical history hypertension, DVT/PE, heart failure, AICD in place, RA, end-stage renal disease on hemodialysis gets dialysis Tuesday Thursday Saturday presents emergency department from dialysis for acute shortness of breath, was dx. with sepsis due to staph hominis bacteremia.     #Sepsis, POA (Febrile 101.2 and tachycardic) improved   # Suspected UTI-  # Staph hominis bacteremia  CTAP 09/12 negative for acute pathology  Blood Cxs  09/12 - staph hominis and staph. epidermitis meth. resistant  MRSA PCR neg, repeated blood cxs neg prelim.  - ID vancomycin 1g post HD x 14 days  - new onset fever 9/20  - f/u blood cultures, NGTD  - per ID: on dc 750mg post HD to be given at dialysis via TDC x 14 days to end 9/26  - 9/24 HD today w IV vanco    #hip pain  #hx of hip fracture  - bilateral hip xrays taken  - evaluated by ortho: no intervention at this time  - tylenol and tramadol PRN for pain  - OP rheum eval    #Acute Hypoxic Respiratory Failure, resolved  CXR 09/12 showing pulmonary edema/interstitial opacities  - fluid removal via HD    #ESRD on HD TTS - HD per nephrology  - c/w HD    #HTN  #Chronic HFrEF 25-30%  #HO PE/DVT  #H/o NSVT   - Echo: (7/23) EF of 25 to 30%  - c/w Entresto   - c/w atorvastatin 20 mg qhs  - c/w metoprolol  mg qd  - c/w Eliquis 2.5 mg bid    # Macrocytic anemia - stable h/h, obtain Vit B12, folate levels    DVT PPX, Eliquis    Code status: DNR/DNI    Pending: placement

## 2024-09-25 ENCOUNTER — TRANSCRIPTION ENCOUNTER (OUTPATIENT)
Age: 89
End: 2024-09-25

## 2024-09-25 VITALS
SYSTOLIC BLOOD PRESSURE: 162 MMHG | OXYGEN SATURATION: 95 % | TEMPERATURE: 98 F | HEART RATE: 80 BPM | RESPIRATION RATE: 19 BRPM | DIASTOLIC BLOOD PRESSURE: 90 MMHG

## 2024-09-25 LAB
ANION GAP SERPL CALC-SCNC: 11 MMOL/L — SIGNIFICANT CHANGE UP (ref 7–14)
BASOPHILS # BLD AUTO: 0.06 K/UL — SIGNIFICANT CHANGE UP (ref 0–0.2)
BASOPHILS NFR BLD AUTO: 1 % — SIGNIFICANT CHANGE UP (ref 0–1)
BUN SERPL-MCNC: 26 MG/DL — HIGH (ref 10–20)
CALCIUM SERPL-MCNC: 8.6 MG/DL — SIGNIFICANT CHANGE UP (ref 8.4–10.5)
CHLORIDE SERPL-SCNC: 96 MMOL/L — LOW (ref 98–110)
CO2 SERPL-SCNC: 26 MMOL/L — SIGNIFICANT CHANGE UP (ref 17–32)
CREAT SERPL-MCNC: 2.9 MG/DL — HIGH (ref 0.7–1.5)
CULTURE RESULTS: SIGNIFICANT CHANGE UP
EGFR: 15 ML/MIN/1.73M2 — LOW
EOSINOPHIL # BLD AUTO: 0.45 K/UL — SIGNIFICANT CHANGE UP (ref 0–0.7)
EOSINOPHIL NFR BLD AUTO: 7.2 % — SIGNIFICANT CHANGE UP (ref 0–8)
GLUCOSE BLDC GLUCOMTR-MCNC: 136 MG/DL — HIGH (ref 70–99)
GLUCOSE SERPL-MCNC: 82 MG/DL — SIGNIFICANT CHANGE UP (ref 70–99)
HCT VFR BLD CALC: 34.3 % — LOW (ref 37–47)
HGB BLD-MCNC: 10.6 G/DL — LOW (ref 12–16)
IMM GRANULOCYTES NFR BLD AUTO: 0.3 % — SIGNIFICANT CHANGE UP (ref 0.1–0.3)
LYMPHOCYTES # BLD AUTO: 1.9 K/UL — SIGNIFICANT CHANGE UP (ref 1.2–3.4)
LYMPHOCYTES # BLD AUTO: 30.3 % — SIGNIFICANT CHANGE UP (ref 20.5–51.1)
MAGNESIUM SERPL-MCNC: 1.8 MG/DL — SIGNIFICANT CHANGE UP (ref 1.8–2.4)
MCHC RBC-ENTMCNC: 29 PG — SIGNIFICANT CHANGE UP (ref 27–31)
MCHC RBC-ENTMCNC: 30.9 G/DL — LOW (ref 32–37)
MCV RBC AUTO: 93.7 FL — SIGNIFICANT CHANGE UP (ref 81–99)
MONOCYTES # BLD AUTO: 0.69 K/UL — HIGH (ref 0.1–0.6)
MONOCYTES NFR BLD AUTO: 11 % — HIGH (ref 1.7–9.3)
NEUTROPHILS # BLD AUTO: 3.16 K/UL — SIGNIFICANT CHANGE UP (ref 1.4–6.5)
NEUTROPHILS NFR BLD AUTO: 50.2 % — SIGNIFICANT CHANGE UP (ref 42.2–75.2)
NRBC # BLD: 0 /100 WBCS — SIGNIFICANT CHANGE UP (ref 0–0)
PLATELET # BLD AUTO: 251 K/UL — SIGNIFICANT CHANGE UP (ref 130–400)
PMV BLD: 9.4 FL — SIGNIFICANT CHANGE UP (ref 7.4–10.4)
POTASSIUM SERPL-MCNC: 4.2 MMOL/L — SIGNIFICANT CHANGE UP (ref 3.5–5)
POTASSIUM SERPL-SCNC: 4.2 MMOL/L — SIGNIFICANT CHANGE UP (ref 3.5–5)
RBC # BLD: 3.66 M/UL — LOW (ref 4.2–5.4)
RBC # FLD: 15.9 % — HIGH (ref 11.5–14.5)
SARS-COV-2 RNA SPEC QL NAA+PROBE: SIGNIFICANT CHANGE UP
SODIUM SERPL-SCNC: 133 MMOL/L — LOW (ref 135–146)
SPECIMEN SOURCE: SIGNIFICANT CHANGE UP
WBC # BLD: 6.28 K/UL — SIGNIFICANT CHANGE UP (ref 4.8–10.8)
WBC # FLD AUTO: 6.28 K/UL — SIGNIFICANT CHANGE UP (ref 4.8–10.8)

## 2024-09-25 PROCEDURE — 99239 HOSP IP/OBS DSCHRG MGMT >30: CPT

## 2024-09-25 RX ORDER — MAGNESIUM SULFATE 500 MG/ML
1 VIAL (ML) INJECTION ONCE
Refills: 0 | Status: COMPLETED | OUTPATIENT
Start: 2024-09-25 | End: 2024-09-25

## 2024-09-25 RX ADMIN — LIDOCAINE 1 PATCH: 50 CREAM TOPICAL at 12:10

## 2024-09-25 RX ADMIN — CHLORHEXIDINE GLUCONATE ORAL RINSE 1 APPLICATION(S): 1.2 SOLUTION DENTAL at 12:10

## 2024-09-25 RX ADMIN — APIXABAN 2.5 MILLIGRAM(S): 5 TABLET, FILM COATED ORAL at 17:31

## 2024-09-25 RX ADMIN — Medication 1 DROP(S): at 06:23

## 2024-09-25 RX ADMIN — SACUBITRIL AND VALSARTAN 1 TABLET(S): 97; 103 TABLET, FILM COATED ORAL at 06:22

## 2024-09-25 RX ADMIN — Medication 1 DROP(S): at 17:32

## 2024-09-25 RX ADMIN — SERTRALINE HYDROCHLORIDE 25 MILLIGRAM(S): 100 TABLET, FILM COATED ORAL at 12:09

## 2024-09-25 RX ADMIN — APIXABAN 2.5 MILLIGRAM(S): 5 TABLET, FILM COATED ORAL at 06:22

## 2024-09-25 RX ADMIN — SACUBITRIL AND VALSARTAN 1 TABLET(S): 97; 103 TABLET, FILM COATED ORAL at 17:31

## 2024-09-25 RX ADMIN — Medication 100 GRAM(S): at 13:17

## 2024-09-25 RX ADMIN — LIDOCAINE 1 PATCH: 50 CREAM TOPICAL at 01:55

## 2024-09-25 RX ADMIN — FOLIC ACID 1 MILLIGRAM(S): 1 TABLET ORAL at 12:09

## 2024-09-25 RX ADMIN — Medication 100 MILLIGRAM(S): at 06:23

## 2024-09-25 NOTE — PROGRESS NOTE ADULT - REASON FOR ADMISSION
chest pain/sob

## 2024-09-25 NOTE — PROGRESS NOTE ADULT - SUBJECTIVE AND OBJECTIVE BOX
Patient seen and examined. Events over the last 24 hours noted.   pending placement     Vital Signs Last 24 Hrs  T(C): 36.8 (25 Sep 2024 06:01), Max: 36.9 (25 Sep 2024 06:00)  T(F): 98.2 (25 Sep 2024 06:01), Max: 98.4 (25 Sep 2024 06:00)  HR: 80 (25 Sep 2024 06:01) (65 - 80)  BP: 162/90 (25 Sep 2024 06:01) (135/75 - 162/90)  BP(mean): 114 (25 Sep 2024 06:01) (95 - 114)  RR: 19 (25 Sep 2024 06:01) (18 - 19)  SpO2: 95% (25 Sep 2024 06:01) (95% - 95%)    Parameters below as of 25 Sep 2024 06:01  Patient On (Oxygen Delivery Method): room air        PHYSICAL EXAM:  GEN: No acute distress  HEENT: normocephalic, atraumatic, anicteric  LUNGS: b/l breath sounds present, clear to auscultation bilaterally   HEART: S1/S2 present. RRR  ABD: Soft, non-tender, non-distended. Bowel sounds present  EXT: warm   NEURO: awake, no new focal deficits    LABS      LABS:                        10.6   6.28  )-----------( 251      ( 25 Sep 2024 08:11 )             34.3     09-25    133[L]  |  96[L]  |  26[H]  ----------------------------<  82  4.2   |  26  |  2.9[H]    Ca    8.6      25 Sep 2024 08:11  Mg     1.8     09-25                   MEDICATIONS  (STANDING):  apixaban 2.5 milliGRAM(s) Oral two times a day  atorvastatin 20 milliGRAM(s) Oral at bedtime  chlorhexidine 2% Cloths 1 Application(s) Topical daily  folic acid 1 milliGRAM(s) Oral daily  lidocaine   4% Patch 1 Patch Transdermal daily  lidocaine   4% Patch 1 Patch Transdermal daily  metoprolol succinate  milliGRAM(s) Oral daily  sacubitril 49 mG/valsartan 51 mG 1 Tablet(s) Oral two times a day  sertraline 25 milliGRAM(s) Oral daily  timolol 0.5% Solution 1 Drop(s) Both EYES two times a day  vancomycin  IVPB 750 milliGRAM(s) IV Intermittent <User Schedule>    MEDICATIONS  (PRN):  acetaminophen     Tablet .. 650 milliGRAM(s) Oral every 6 hours PRN Temp greater or equal to 38C (100.4F), Mild Pain (1 - 3), Moderate Pain (4 - 6)  acetaminophen     Tablet .. 650 milliGRAM(s) Oral every 6 hours PRN Mild Pain (1 - 3)  traMADol 50 milliGRAM(s) Oral every 6 hours PRN Moderate Pain (4 - 6)

## 2024-09-25 NOTE — PROGRESS NOTE ADULT - SUBJECTIVE AND OBJECTIVE BOX
Nephrology Progress Note    RACHEL SUMMERS  MRN-[id number]  89y  Female    S:  Patient is seen and examined, events over the last 24h noted.    O:  Allergies:  Keflex (Swelling)  cephalosporins (Angioedema)    Hospital Medications:   MEDICATIONS  (STANDING):  apixaban 2.5 milliGRAM(s) Oral two times a day  atorvastatin 20 milliGRAM(s) Oral at bedtime  chlorhexidine 2% Cloths 1 Application(s) Topical daily  folic acid 1 milliGRAM(s) Oral daily  lidocaine   4% Patch 1 Patch Transdermal daily  lidocaine   4% Patch 1 Patch Transdermal daily  metoprolol succinate  milliGRAM(s) Oral daily  sacubitril 49 mG/valsartan 51 mG 1 Tablet(s) Oral two times a day  sertraline 25 milliGRAM(s) Oral daily  timolol 0.5% Solution 1 Drop(s) Both EYES two times a day  vancomycin  IVPB 750 milliGRAM(s) IV Intermittent <User Schedule>    MEDICATIONS  (PRN):  acetaminophen     Tablet .. 650 milliGRAM(s) Oral every 6 hours PRN Mild Pain (1 - 3)  acetaminophen     Tablet .. 650 milliGRAM(s) Oral every 6 hours PRN Temp greater or equal to 38C (100.4F), Mild Pain (1 - 3), Moderate Pain (4 - 6)  traMADol 50 milliGRAM(s) Oral every 6 hours PRN Moderate Pain (4 - 6)    Home Medications:  acetaminophen 325 mg oral tablet: 2 tab(s) orally every 6 hours as needed for  mild pain (23 Sep 2024 14:10)  apixaban 2.5 mg oral tablet: 1 tab(s) orally 2 times a day (19 Sep 2024 10:18)  atorvastatin 20 mg oral tablet: 1 tab(s) orally once a day (at bedtime) (19 Sep 2024 10:18)  metoprolol succinate 100 mg oral tablet, extended release: 1 tab(s) orally once a day (19 Sep 2024 10:18)  sacubitril-valsartan 49 mg-51 mg oral tablet: 1 tab(s) orally 2 times a day (19 Sep 2024 10:18)  sertraline 25 mg oral tablet: 1 tab(s) orally once a day (19 Sep 2024 10:18)  traMADol 50 mg oral tablet: 1 tab(s) orally every 6 hours As needed Moderate Pain (4 - 6) (23 Sep 2024 14:10)  vancomycin 750 mg intravenous injection: 750 milligram(s) intravenous 3 times a week after hemodialysis session till 9/26. (23 Sep 2024 14:10)      VITALS:  Daily     Daily   T(F): 98.2 (09-25-24 @ 06:01), Max: 98.4 (09-25-24 @ 06:00)  HR: 80 (09-25-24 @ 06:01)  BP: 162/90 (09-25-24 @ 06:01)  RR: 19 (09-25-24 @ 06:01)  SpO2: 95% (09-25-24 @ 06:01)  Wt(kg): --  I&O's Detail    24 Sep 2024 07:01  -  25 Sep 2024 07:00  --------------------------------------------------------  IN:  Total IN: 0 mL    OUT:    Other (mL): 1000 mL  Total OUT: 1000 mL    Total NET: -1000 mL        I&O's Summary    24 Sep 2024 07:01  -  25 Sep 2024 07:00  --------------------------------------------------------  IN: 0 mL / OUT: 1000 mL / NET: -1000 mL          PHYSICAL EXAM:  Gen: NAD  Chest: b/l breath sounds  Abd: soft  Vascular access: TDC      LABS:      09-25    133[L]  |  96[L]  |  26[H]  ----------------------------<  82  4.2   |  26  |  2.9[H]    Ca    8.6      25 Sep 2024 08:11  Mg     1.8     09-25    Phosphorus: 3.2 mg/dL (09-13-24 @ 07:11)  Phosphorus: 3.5 mg/dL (07-26-24 @ 07:26)                            10.6   6.28  )-----------( 251      ( 25 Sep 2024 08:11 )             34.3     Mean Cell Volume: 93.7 fL (09-25-24 @ 08:11)

## 2024-09-25 NOTE — DISCHARGE NOTE NURSING/CASE MANAGEMENT/SOCIAL WORK - NSDCPEFALRISK_GEN_ALL_CORE
For information on Fall & Injury Prevention, visit: [email or web address] OR  [email or web address] OR  https://www.cdc.gov/steadi/patient.html

## 2024-09-25 NOTE — PROGRESS NOTE ADULT - PROVIDER SPECIALTY LIST ADULT
Hospitalist
Internal Medicine
Internal Medicine
Nephrology
Hospitalist
Hospitalist
Infectious Disease
Internal Medicine
Nephrology
Internal Medicine
Internal Medicine
Nephrology
Hospitalist
Infectious Disease
Internal Medicine
Infectious Disease
Internal Medicine

## 2024-09-25 NOTE — PROGRESS NOTE ADULT - ASSESSMENT
Patient is an 89-year-old female, past medical history hypertension, DVT/PE, heart failure, AICD in place, RA, end-stage renal disease on hemodialysis gets dialysis Tuesday Thursday Saturday presents emergency department from dialysis for acute shortness of breath, was dx. with sepsis due to staph hominis bacteremia.     #Sepsis, POA (Febrile 101.2 and tachycardic) improved   # Suspected UTI-  # Staph hominis bacteremia  CTAP 09/12 negative for acute pathology  Blood Cxs  09/12 - staph hominis and staph. epidermitis meth. resistant  MRSA PCR neg, repeated blood cxs neg prelim.  - ID vancomycin 1g post HD x 14 days  - new onset fever 9/20  - f/u blood cultures, NGTD  - per ID: on dc 750mg post HD to be given at dialysis via TDC x 14 days to end 9/26  - 9/24 HD  w IV vanco  9/25 pending placement     #hip pain  #hx of hip fracture  - bilateral hip xrays taken  - evaluated by ortho: no intervention at this time  - tylenol and tramadol PRN for pain  - OP rheum eval    #Acute Hypoxic Respiratory Failure, resolved  CXR 09/12 showing pulmonary edema/interstitial opacities  - fluid removal via HD    #ESRD on HD TTS - HD per nephrology  - c/w HD    #HTN  #Chronic HFrEF 25-30%  #HO PE/DVT  #H/o NSVT   - Echo: (7/23) EF of 25 to 30%  - c/w Entresto   - c/w atorvastatin 20 mg qhs  - c/w metoprolol  mg qd  - c/w Eliquis 2.5 mg bid    # Macrocytic anemia - stable h/h, obtain Vit B12, folate levels    DVT PPX, Eliquis    Code status: DNR/DNI    Pending: Medically cleared (last dose of vanco on 9/26 can be done at outpt dialysis center), pending placement , pending covid test (exposure). patient asymptomatic.

## 2024-09-25 NOTE — DISCHARGE NOTE NURSING/CASE MANAGEMENT/SOCIAL WORK - NSDCFUADDAPPT_GEN_ALL_CORE_FT
APPTS ARE READY TO BE MADE: [ ] YES    Best Family or Patient Contact (if needed):    Additional Information about above appointments (if needed):    1: Infectious Disease for IV antbiotics course  2:   3:     Other comments or requests:   follow up with your PCP and specialists as scheduled, go to ed in case of new or worsening symptoms

## 2024-09-25 NOTE — DISCHARGE NOTE NURSING/CASE MANAGEMENT/SOCIAL WORK - PATIENT PORTAL LINK FT
You can access the FollowMyHealth Patient Portal offered by [facility] by registering at the following website: [email or web address]. By joining [employer]’s FollowMyHealth portal, you will also be able to view your health information using other applications (apps) compatible with our system.

## 2024-09-25 NOTE — DISCHARGE NOTE NURSING/CASE MANAGEMENT/SOCIAL WORK - NSPROEXTENSIONSOFSELF_GEN_A_NUR
Kenalog Preparation: kenalog Treatment Number (Optional): 1 Total Volume (Ccs): 1.5 Detail Level: None Concentration (Mg/Ml) Of Additional Medication: 2.5 none Add Option For Additional Mediation: No Consent: The risks of atrophy were reviewed with the patient. Concentration (Mg/Ml): 40.0 Administered By (Optional): LOYDA Banks CDT

## 2024-09-25 NOTE — PROGRESS NOTE ADULT - ASSESSMENT
89-year-old female, past medical history hypertension, DVT/PE, heart failure, AICD in place, RA, end-stage renal disease on hemodialysis gets dialysis Tuesday Thursday Saturday admitted for acute hypoxemic respiratory failure. Nephrology consulted regarding HD  # ESRD on HD.   - Has right tunneled catheter  - next HD tomorrow  - on  vanco /  repeat BC 9/15 negative  / follow vanc level   - last Phos at target, not on binders   - h/h noted / no need for ELIJAH   - monitor BP  pending placement

## 2024-09-30 DIAGNOSIS — Z11.52 ENCOUNTER FOR SCREENING FOR COVID-19: ICD-10-CM

## 2024-09-30 DIAGNOSIS — Z88.1 ALLERGY STATUS TO OTHER ANTIBIOTIC AGENTS: ICD-10-CM

## 2024-09-30 DIAGNOSIS — Z79.01 LONG TERM (CURRENT) USE OF ANTICOAGULANTS: ICD-10-CM

## 2024-09-30 DIAGNOSIS — M06.9 RHEUMATOID ARTHRITIS, UNSPECIFIED: ICD-10-CM

## 2024-09-30 DIAGNOSIS — M10.9 GOUT, UNSPECIFIED: ICD-10-CM

## 2024-09-30 DIAGNOSIS — D84.89 OTHER IMMUNODEFICIENCIES: ICD-10-CM

## 2024-09-30 DIAGNOSIS — M25.552 PAIN IN LEFT HIP: ICD-10-CM

## 2024-09-30 DIAGNOSIS — I13.2 HYPERTENSIVE HEART AND CHRONIC KIDNEY DISEASE WITH HEART FAILURE AND WITH STAGE 5 CHRONIC KIDNEY DISEASE, OR END STAGE RENAL DISEASE: ICD-10-CM

## 2024-09-30 DIAGNOSIS — Z66 DO NOT RESUSCITATE: ICD-10-CM

## 2024-09-30 DIAGNOSIS — I50.23 ACUTE ON CHRONIC SYSTOLIC (CONGESTIVE) HEART FAILURE: ICD-10-CM

## 2024-09-30 DIAGNOSIS — M25.551 PAIN IN RIGHT HIP: ICD-10-CM

## 2024-09-30 DIAGNOSIS — M25.569 PAIN IN UNSPECIFIED KNEE: ICD-10-CM

## 2024-09-30 DIAGNOSIS — I42.8 OTHER CARDIOMYOPATHIES: ICD-10-CM

## 2024-09-30 DIAGNOSIS — N18.6 END STAGE RENAL DISEASE: ICD-10-CM

## 2024-09-30 DIAGNOSIS — Z90.710 ACQUIRED ABSENCE OF BOTH CERVIX AND UTERUS: ICD-10-CM

## 2024-09-30 DIAGNOSIS — G89.29 OTHER CHRONIC PAIN: ICD-10-CM

## 2024-09-30 DIAGNOSIS — R73.9 HYPERGLYCEMIA, UNSPECIFIED: ICD-10-CM

## 2024-09-30 DIAGNOSIS — Z86.718 PERSONAL HISTORY OF OTHER VENOUS THROMBOSIS AND EMBOLISM: ICD-10-CM

## 2024-09-30 DIAGNOSIS — N39.0 URINARY TRACT INFECTION, SITE NOT SPECIFIED: ICD-10-CM

## 2024-09-30 DIAGNOSIS — Z95.810 PRESENCE OF AUTOMATIC (IMPLANTABLE) CARDIAC DEFIBRILLATOR: ICD-10-CM

## 2024-09-30 DIAGNOSIS — J96.01 ACUTE RESPIRATORY FAILURE WITH HYPOXIA: ICD-10-CM

## 2024-09-30 DIAGNOSIS — J96.02 ACUTE RESPIRATORY FAILURE WITH HYPERCAPNIA: ICD-10-CM

## 2024-09-30 DIAGNOSIS — Z99.2 DEPENDENCE ON RENAL DIALYSIS: ICD-10-CM

## 2024-09-30 DIAGNOSIS — A41.1 SEPSIS DUE TO OTHER SPECIFIED STAPHYLOCOCCUS: ICD-10-CM

## 2024-09-30 DIAGNOSIS — J98.11 ATELECTASIS: ICD-10-CM

## 2024-09-30 DIAGNOSIS — D53.9 NUTRITIONAL ANEMIA, UNSPECIFIED: ICD-10-CM

## 2024-09-30 DIAGNOSIS — Z86.711 PERSONAL HISTORY OF PULMONARY EMBOLISM: ICD-10-CM

## 2024-09-30 DIAGNOSIS — Z79.899 OTHER LONG TERM (CURRENT) DRUG THERAPY: ICD-10-CM

## 2024-09-30 DIAGNOSIS — E87.1 HYPO-OSMOLALITY AND HYPONATREMIA: ICD-10-CM

## 2024-11-04 NOTE — ED ADULT NURSE NOTE - NS ED NOTE  TALK SOMEONE YN
Last Office Visit: 07/25/2024    Last Refill:   Medication Quantity Refills Start End   rosuvastatin 20 MG Oral Tab 90 tablet 0 5/16/2024 --     Return to Clinic: complete labs in October     Protocol: passed      
No

## 2024-11-14 NOTE — H&P ADULT - ATTENDING SUPERVISION STATEMENT
Message to Dr. Bustamante, please advise.    Patient fails the following protocol \"Not on Clopidogrel (Plavix) or if on, refill is for Pantoprazole (Protonix)\"    lansoprazole (PREVACID) 30 MG capsule          Sig: Take 1 capsule by mouth daily.     LOV: 11/9/24    Last refill: 2/28/24    Thanks,  Tommy Smallwood, CMA  
Resident

## 2024-11-21 NOTE — PHYSICAL THERAPY INITIAL EVALUATION ADULT - SHORT TERM MEMORY, REHAB EVAL
intact
pt states 1 hour ago, felt chest rightness/ sob asthma symptoms, gave 5 puffs of Albuterol with some relief, pt RR elevated, pt attempting to calm breathing. denies infections.

## 2024-11-25 NOTE — PROGRESS NOTE ADULT - PROBLEM/PLAN-4
Addended by: ARETHA DEL VALLE on: 11/25/2024 02:36 PM     Modules accepted: Orders    
DISPLAY PLAN FREE TEXT

## 2024-12-03 NOTE — ED ADULT NURSE NOTE - NSFALLRSKUNASSIST_ED_ALL_ED
Spoke with pt-  Informed her can take up to 800mg ibuprofen every 8 hours. Informed can take 600mg as well to try.   Discussed taking tylenol and decongestant as well for headache to try. Informed to f/u with persistent sx. Patient states there are no further questions at this time.     no

## 2024-12-18 NOTE — GOALS OF CARE CONVERSATION - ADVANCED CARE PLANNING - MOLST COMPLETED
01-Jul-2020 Alert-The patient is alert, awake and responds to voice. The patient is oriented to time, place, and person. The triage nurse is able to obtain subjective information.

## 2025-01-10 ENCOUNTER — APPOINTMENT (OUTPATIENT)
Dept: CARDIOLOGY | Facility: CLINIC | Age: 89
End: 2025-01-10
Payer: MEDICARE

## 2025-01-10 VITALS
DIASTOLIC BLOOD PRESSURE: 59 MMHG | HEIGHT: 62 IN | BODY MASS INDEX: 22.08 KG/M2 | HEART RATE: 59 BPM | WEIGHT: 120 LBS | SYSTOLIC BLOOD PRESSURE: 95 MMHG

## 2025-01-10 VITALS — DIASTOLIC BLOOD PRESSURE: 67 MMHG | SYSTOLIC BLOOD PRESSURE: 112 MMHG

## 2025-01-10 DIAGNOSIS — Z86.718 PERSONAL HISTORY OF OTHER VENOUS THROMBOSIS AND EMBOLISM: ICD-10-CM

## 2025-01-10 DIAGNOSIS — I50.9 HEART FAILURE, UNSPECIFIED: ICD-10-CM

## 2025-01-10 DIAGNOSIS — E11.9 TYPE 2 DIABETES MELLITUS W/OUT COMPLICATIONS: ICD-10-CM

## 2025-01-10 DIAGNOSIS — M79.89 OTHER SPECIFIED SOFT TISSUE DISORDERS: ICD-10-CM

## 2025-01-10 DIAGNOSIS — E78.5 HYPERLIPIDEMIA, UNSPECIFIED: ICD-10-CM

## 2025-01-10 DIAGNOSIS — I42.9 CARDIOMYOPATHY, UNSPECIFIED: ICD-10-CM

## 2025-01-10 PROCEDURE — 93000 ELECTROCARDIOGRAM COMPLETE: CPT

## 2025-01-10 PROCEDURE — 99214 OFFICE O/P EST MOD 30 MIN: CPT

## 2025-01-10 RX ORDER — NIFEDIPINE 30 MG/1
30 TABLET, EXTENDED RELEASE ORAL
Refills: 0 | Status: ACTIVE | COMMUNITY

## 2025-01-10 RX ORDER — FUROSEMIDE 40 MG/1
40 TABLET ORAL
Refills: 0 | Status: ACTIVE | COMMUNITY

## 2025-01-10 RX ORDER — ATORVASTATIN CALCIUM 20 MG/1
20 TABLET, FILM COATED ORAL
Qty: 60 | Refills: 0 | Status: ACTIVE | COMMUNITY

## 2025-01-10 RX ORDER — SACUBITRIL AND VALSARTAN 24; 26 MG/1; MG/1
24-26 TABLET, FILM COATED ORAL
Refills: 0 | Status: ACTIVE | COMMUNITY

## 2025-01-10 RX ORDER — HYDRALAZINE HYDROCHLORIDE 25 MG/1
25 TABLET ORAL
Refills: 0 | Status: ACTIVE | COMMUNITY

## 2025-01-10 RX ORDER — METOPROLOL SUCCINATE 100 MG/1
100 TABLET, EXTENDED RELEASE ORAL
Refills: 0 | Status: ACTIVE | COMMUNITY

## 2025-01-10 NOTE — ED PROVIDER NOTE - IV ALTEPLASE INCLUSION HIDDEN
show
Detail Level: Detailed
Number Of Freeze-Thaw Cycles: 1 freeze-thaw cycle
Render Post-Care Instructions In Note?: yes
Consent: The patient's verbal consent was obtained including but not limited to risks of crusting, scabbing, blistering, scarring, darker or lighter pigmentary change, recurrence, incomplete removal and infection.
Render Note In Bullet Format When Appropriate: No
Duration Of Freeze Thaw-Cycle (Seconds): 8
Post-Care Instructions: I reviewed with the patient in detail post-care instructions. Patient may apply Vaseline or Aquaphor to crusted or scabbing areas.\\nPatient to return to clinic for evaluation if lesion is persistent after 6 weeks.

## 2025-01-22 NOTE — PATIENT PROFILE ADULT - NSASFALLATTEMPTOOB_GEN_A_NUR
Quality 134: Screening For Clinical Depression And Follow-Up Plan: The patient was screened for depression and the screen was negative and no follow up required
Quality 226: Preventive Care And Screening: Tobacco Use: Screening And Cessation Intervention: Patient screened for tobacco use and is an ex/non-smoker
Quality 130: Documentation Of Current Medications In The Medical Record: Current Medications Documented
Detail Level: Detailed
Quality 431: Preventive Care And Screening: Unhealthy Alcohol Use - Screening: Patient not identified as an unhealthy alcohol user when screened for unhealthy alcohol use using a systematic screening method
Quality 47: Advance Care Plan: Advance Care Planning discussed and documented in the medical record; patient did not wish or was not able to name a surrogate decision maker or provide an advance care plan.
no

## 2025-02-06 NOTE — PATIENT PROFILE ADULT - NSASFUNCLEVELADLTOILET_GEN_A_NUR
[employer] Approved    Prior Authorization Number: [id number]  Dates of approval: 02/06/2025-08/05/2025  Filling Pharmacy: [facility]   3 = assistive equipment and person

## 2025-02-28 NOTE — PHYSICAL THERAPY INITIAL EVALUATION ADULT - PHYSICAL ASSIST/NONPHYSICAL ASSIST: SIT/STAND, REHAB EVAL
REASON FOR CONSULTATION:   Varicose veins, leg pain    Referring Provider: Dennys Abarca NP    30  minutes were spent on this encounter     HISTORY OF PRESENT ILLNESS:   Ingris is a 57 -year-old female patient with pmh significant for hypertension, type 2 diabetes, mixed hyperlipidemia, arthritis, and venous ulcer of left lower extremity who has complaints of  varicosities in the left lower extremity.     Patient has had significant left lower extremity pain and swelling for well over a year significantly worse the last 6 months.  She has had 2 significant venous ulcers on the lateral and medial ankle within the last 6 months managed with wound care. Significant hemosiderin staining bilateral LE.      Symptoms include: Hemosiderin staining, healing ulcers, pain and swelling.  Skin Ulcer : Yes  Episode of Gabriel Bleeding: No  Superficail thrombophlebitis or stasis dermatitis: Stasis dermatitis   Moderate to serve pain causing functional. Physical impairment :Yes  Attempts to improve symptoms have been:, Ibuprofen, tylenol and continues to wear compression stockings and/or Tubigrip's with minimal improvement     Denies history of DVT, bleeding/clotting disorder, heart disease, PFO, stroke, migraine headaches.     PAST MEDICAL HISTORY:   Past Medical History:   Diagnosis Date    Abnormal Pap smear of cervix     JUANITA-2 needs paps annually until     Allergy     Arthritis of knee     right-severe, left moderate    Asthma (CMD)     Diabetes mellitus  (CMD)     Essential hypertension, benign     High cholesterol     PONV (postoperative nausea and vomiting)     Vasculitic ulcer of left lower extremity  (CMD)      Review of patient's family status indicates:    Father                                           Mother                         Alive                       Comment: Leukemia    Maternal Grandmother                             Maternal Grandfather                             Sister   
                      Alive                     Brother                        Alive                     Daughter                       Alive                       Comment: Adopted    Son                            Alive                       Comment: Adopted    Maternal Uncle                                   Patient Active Problem List   Diagnosis    Asthma exacerbation, mild (CMD)    Essential hypertension, benign    Abnormal Pap smear of cervix    Vasculitic ulcer of left lower extremity  (CMD)    Venous ulcer of left leg  (CMD)    Type 2 diabetes mellitus, without long-term current use of insulin  (CMD)    Mixed hyperlipidemia    GERD (gastroesophageal reflux disease)    Colon polyps    Blood in stool    Iron deficiency anemia    Arthritis of both knees    Edema of both legs    Encounter to establish care     Past Surgical History:   Procedure Laterality Date    Colonoscopy  2019    Repeat in 3 Years    Colonoscopy  2022    Colonoscopy in 4-5 years.    Endocervical curettage      and cold knife cone for abnormal PAP    Esophagogastroduodenoscopy  2019    Repeat in 3 Years     Esophagogastroduodenoscopy  2021    Dr. Burnette    Pelvic laparoscopy      for evaluation of infertility    Total knee arthroplasty Bilateral 2022    Bilateral total knee arthroplasty- Dr. Peterson          ALLERGIES:   ALLERGIES:   Allergen Reactions    Seasonal Other (See Comments)        MEDICATIONS:   Prior to Admission medications    Medication Sig Start Date End Date Taking? Authorizing Provider   amLODIPine (NORVASC) 10 MG tablet Take 1 tablet by mouth once daily 25   Kindra Castellano APNP   gentamicin (GARAMYCIN) 0.1 % ointment Apply 1 Application topically daily. Apply to once daily to left ankle ulcers as directed by wound care  Patient not taking: Reported on 2025    Provider, Outside   fluticasone (FLONASE) 50 MCG/ACT nasal spray Spray 2 sprays in each nostril daily. 
2/17/25   Kindra Castellano APNP   semaglutide,0.25 or 0.5 mg/DOSE, (Ozempic, 0.25 or 0.5 MG/DOSE,) (0.68 mg/mL) injection Indications: Type 2 Diabetes 0.25 mg subcutaneous every 7 days for 4 weeks.  Then starting March 17, 2025 0.5 mg subcutaneous every 7 days.  Patient not taking: Reported on 2/28/2025 2/17/25   Kindra Castellano APNP   lisinopril (ZESTRIL) 40 MG tablet Take 1 tablet by mouth daily. 2/17/25   Kindra Castellano APNP   atenolol (TENORMIN) 100 MG tablet Take 1 tablet by mouth daily. 2/17/25   Kindra Castellano APNP   metFORMIN (GLUCOPHAGE) 500 MG tablet TAKE 2 TABLETS BY MOUTH IN THE MORNING AND 2 TABLETS IN THE EVENING WITH MEALS 1/27/25   Kindra Castellano APNP   furosemide (LASIX) 40 MG tablet Take 1 tablet by mouth daily. 12/26/24   Kindra Castellano APNP   fluticasone-salmeterol 250-50 MCG/ACT inhaler Inhale 1 puff into the lungs in the morning and 1 puff in the evening. 12/3/24   Kindra Castellano APNP   atorvastatin (LIPITOR) 40 MG tablet Take 1 tablet by mouth daily. 5/28/24   Michael Pham MD   doxazosin (CARDURA) 8 MG tablet Take 2 tablets by mouth daily. 2/21/24   Christel Santos,    levalbuterol (XOPENEX HFA) 45 MCG/ACT inhaler Inhale 1-2 puffs into the lungs every 4 hours as needed for Wheezing. May need to switch to this inhaler if albuterol is not covered 1/4/24   uRel Pruett MD   omeprazole (PRILOSEC) 20 MG capsule Take 20 mg by mouth daily.    Provider, Outside   acetaminophen (TYLENOL) 500 MG tablet Take 1,500 mg by mouth every 6 hours as needed for Pain (Knee pain).     Provider, Outside   ONETOUCH VERIO test strip Test blood sugar daily in the morning or 2 hours after meals. 8/18/17   Azalea Gomez APNP   Blood Glucose Monitoring Suppl (ONETOUCH VERIO) w/Device Kit 1 kit by Other route as directed. Daily in am or 2 hours after meal 8/18/17   Azalea Gomez APNP   ONETOUCH DELICA LANCETS 33G Misc Daily in am or 2 hours after meal 8/18/17   Jason 
GARRICK Castillo   sharps container For lancets 8/18/17   Azalea Gomez GARRICK KING        SOCIAL HISTORY:  reports that she has never smoked. She has never used smokeless tobacco. She reports current alcohol use. She reports that she does not use drugs.     Review of Systems:  GENERAL: Normal state of health, denies fever/chills      PULMONARY: Denies cough, SOB  CARDIAC: Denies chest pain,       DERMATOLOGICAL: Denies rash, dryness,   MUSKULOSKELETAL: Denies calf pain, redness      VITAL SIGNS: Visit Vitals  BP (!) 173/79 (BP Location: LUE - Left upper extremity, Patient Position: Sitting, Cuff Size: Regular)   Pulse (!) 58   Temp 96.7 °F (35.9 °C) (Temporal)   LMP 04/24/2014      PHYSICAL EXAMINATION:   GENERAL:  alert and oriented x3, ambulating on  own accord,  in no apparent acute distress.   HEAD: Normocephalic and atraumatic.   LUNGS: Non labored  SKIN: warm and dry without obvious rashes or lesions. Hemiacidrin staining bilateral LE.   EXTREMITIES: Mild bilateral lower extremity edema.  Pulses: Bilateral DP palpable, PT monophasic doppler.  Cap refill less than 3 seconds bilaterally, warm to touch.     Pt has given verbal consent for the use of photography for medical documentation .                 DIAGNOSTIC TESTING:   US VASC EXTREMITY VENOUS DUPLEX INSUFFICIENCY LEFT 01/20/2025 [id number] Final     Ultrasound venous duplex insufficiency left 1/20/2025  IMPRESSION:    LEFT  Deep venous system  Obstruction - Patent; no acute thrombosis or chronic post-thrombotic change  is present  Insufficiency - Competent throughout     Superficial venous system  Superficial venous insufficiency -  1. The small saphenous vein is suspected of being axially and competent  from the saphenopopliteal junction, although reflux could not be  demonstrated in the upper calf despite there being an 8.0 mm diameter  segment; the small saphenous vein is incompetent at the mid calf and is  suspected of being the sole source of 
superficial venous insufficiency  identified in the left leg  2. Reflux in the great saphenous vein is isolated to a very small diameter  segment at mid calf and is of doubtful significance; the great saphenous is  otherwise competent throughout    Perforating vein > 3.5 mm and 350 ms of reflux Yes      CEAP classification of chronic venous disease / clinical classification  C0 No visible or palpable signs of venous disease  C1 Telangiectasies or reticular veins  C2 Varicose veins   1. not visible   2. faintly visible   3. protuberant   4. enormous  C3 Edema  C4a Pigmentation or eczema  C4b Lipodermatosclerosis or athrophie anna  C5 Healed venous ulcer  C6 Active venous ulcers    ASSESSMENT AND PLAN:   Ingris is a 57 -year-old female patient with history significant for left lower extremity ulcer, who presents to IR for evaluation of venous insufficiency.       -Reviewed with Dr. Camacho. Vein treatment plan includes:  -LLE EVLA to SSV with supplementary ultra sound guided sclerotherapy   -Discussion of risks benefits of treatment with EVLA and sclerotherapy were discussed. Risks and Possible Complications Include but not limited to bleeding, infection, blood clots, damage to nerves in the area treated, irritation or burning of the skin over area treated, failure of treatment to improve symptoms or appearance of the skin or veins, risks of medications used. Patient also counseled on the benefits and alternatives to procedure which include medical management with compression stockings. Patient is agreeable to plan  -discussion of anticipated procedure, recovery, and follow up.   -Patient aware that they will need to wear knee high 30-40 mmHg compression stockings for 2 weeks post procedure. Script provided.   -Replacement Tubigrip's provided in office today  - Explained that a prescription for valium can be provided once scheduled and approved for treatment.   -Follow up with IR clinic 2 weeks post vein 
treatment      Thank you kindly for the opportunity to care for your patient.      GARRICK Ortega  Interventional Radiology  Available via Epic Secure Chat ([phone])    Please page the on-call IR provider for urgent needs outside of above hours.           
verbal cues/nonverbal cues (demo/gestures)/1 person assist
none

## 2025-03-07 ENCOUNTER — APPOINTMENT (OUTPATIENT)
Facility: CLINIC | Age: 89
End: 2025-03-07
Payer: MEDICARE

## 2025-03-07 DIAGNOSIS — M25.511 PAIN IN RIGHT SHOULDER: ICD-10-CM

## 2025-03-07 PROCEDURE — 99204 OFFICE O/P NEW MOD 45 MIN: CPT | Mod: 25

## 2025-03-07 PROCEDURE — 73030 X-RAY EXAM OF SHOULDER: CPT | Mod: RT

## 2025-03-07 PROCEDURE — 20610 DRAIN/INJ JOINT/BURSA W/O US: CPT | Mod: RT

## 2025-03-10 PROBLEM — M25.511 RIGHT SHOULDER PAIN: Status: ACTIVE | Noted: 2025-03-10

## 2025-03-11 NOTE — ED ADULT NURSE NOTE - CAS EDP DISCH DISPOSITION ADMI
03/11/25      Re: Alysia Rider  1990      Please mail images from CT Abdomen Pelvis from 3/2/25.         Thank you,     Pura Cabrera MD    [facility]  [address] [place] [id number]  Phone: [phone]  Fax: [phone]                 [facility]

## 2025-03-31 NOTE — OCCUPATIONAL THERAPY INITIAL EVALUATION ADULT - TRANSFER SAFETY CONCERNS NOTED: TOILET, REHAB EVAL
Patient arrived for cardiac rehab session. Pt reports to have eaten prior to exercise session.  The patient was on continuous EKG monitoring throughout the session. The patient tolerated exercise session well with no complaints.   decreased balance during turns/losing balance/stand pivot/decreased step length/decreased weight-shifting ability

## 2025-04-03 NOTE — PROGRESS NOTE ADULT - ASSESSMENT
[facility]     Endovascular Surgical Neuroradiology Post-Procedure Note    Pre-Procedure Diagnosis: migraines with aura, history of ischemic stroke  Post-Procedure Diagnosis: same    Procedure:   Diagnostic cervicocerebral angiography    Reason for delay:  Not applicable    Findings:   Normal cerebral angiogram. No evidence of vasoconstrictive changes, aneurysms, or vascular malformations/ fistulae  Slightly high rising right jugular bulb    Plan:    TR deflation per protocol  Continue follow up with neurology  Increase aspirin to 325mg daily for 3 weeks    Primary Surgeon: Dr. Julio Linton  Secondary Surgeon: Not applicable  Secondary Surgeon Review: None  Fellow: Chika Schmid MD; Kamala Pro MD  Additional Assistants: none    Prior to the start of the procedure and with procedural staff participation, I verbally confirmed: the patient s identity using two indicators, relevant allergies, that the procedure was appropriate and matched the consent or emergent situation, and that the correct equipment/implants were available. Immediately prior to starting the procedure I conducted the Time Out with the procedural staff and re-confirmed the patient s name, procedure, and site/side. (The Joint Commission universal protocol was followed.)  Yes    PRU value: Not applicable    Anesthesia: Conscious Sedation  Medications:  Midazolam 4 mg IV, Fentanyl 200 mcg IV, lidocaine 1% 2cc topical   Puncture site: Right Radial Artery    Fluoroscopy time (minutes): 5.9  Radiation dose (mGy):  250.3     Contrast amount (mL):  35      Estimated blood loss (mL): 10    Closure:  TR band    Bedrest - N/A    Disposition: Home after recovery.        Sedation Post-Procedure Summary    The patient was reevaluated immediately before administering moderate or deep sedation or anesthesia.    Sedatives: see above    Vital signs and pulse oximetry were monitored and remained stable  throughout the procedure, and sedation was maintained until the procedure was complete.  The patient was monitored by staff until sedation discharge criteria were met.    Patient tolerance: Patient tolerated the procedure well with no immediate complications.    Time of sedation in minutes: 23 minutes from beginning to end of physician one to one monitoring.    Chika Schmid MD  Fellow, Endovascular Surgical Neuroradiology    Use Vocera to contact: Neuro IR Fellow ([place])   Patient with CKD 4 (Baseline Cr. ~ 1.95 - 2.2) presented to hospital with chest pain and dry cough, LLQ pain, b/l knee pain, left flank pain, dysuria, urinary frequency  PMH HTN, CKD 4 (baseline cr. ~ 1.95 - 2.2), PPM.    # CKD 4/Hyperkalemia   - S. Cr. stable around baseline   - No need for RRT at the moment.   - low K diet   - Repeat BMP, if K > 5.5 on repeat BMP check EKG and give single dose of insulin and dextrose   - Monitor K level and EKG closely   - check UA   - Monitor I & O   - Avoid nephrotoxins and Hypotension   - Pt explained about adverse renal effects of NSAIDs and advised to avoid continuos use NSAIDs for pain.    # HTN   - BP at goal   - cont. current meds    # MBD   - Ca noted, WNL   - check Ph, iPTH   - on no binders    # Anemia   - Hb noted.   - No need for ELIJAH   - start oral FeSO4, check serum Fe studies   - Monitor h/h. Tx if Hb < 7    # Sepsis/ PNA/ Arthritis/ UTI/ Fever   - consider ID consult please   - cont. Abx   - check UCx, BCx    Will follow Patient with CKD 4 (Baseline Cr. ~ 1.95 - 2.2) presented to hospital with chest pain and dry cough, LLQ pain, b/l knee pain, left flank pain, dysuria, urinary frequency  PMH HTN, CKD 4 (baseline cr. ~ 1.95 - 2.2), PPM.    # CKD 4/Hyperkalemia   - S. Cr. stable around baseline   - No need for RRT at the moment.   - low K diet   - Monitor K level    - Monitor I & O   - Avoid nephrotoxins and Hypotension   - Pt explained about adverse renal effects of NSAIDs and advised to avoid continuos use NSAIDs for pain.    # HTN   - BP at goal   - cont. current meds    # MBD   - Ca noted, WNL   - Ph noted start renagel one tab po qac       # Anemia   - Hb noted.   - No need for ELIJAH   - start oral FeSO4, check serum Fe studies   - Monitor h/h. Tx if Hb < 7    # Sepsis/ PNA/ Arthritis/ UTI/ Fever   - on Cipro Flagyl     Will follow

## 2025-04-14 NOTE — CONSULT NOTE ADULT - SUBJECTIVE AND OBJECTIVE BOX
Administered 1000 mcg cyanocobalamin IM left deltoid.  Patient tolerated well.  No reaction observed.  Patient to return in one week for next injection.   Date of Admission: 06-20-20    CHIEF COMPLAINT: Patient is a 84y old  Female who presents with a chief complaint of shortness of breath, troponinemia (20 Jun 2020 18:51)      HPI:  The patient is an 84 year-old female with a PMH of DVT/PE (on Eliquis), HTN, early glaucoma, DLD, HFpEF (last EF 53% 2/2020), s/p PPM, CKD III/IV, gout, multiple caths (neg), stress test (neg per patient), rheumatoid arthritis, presenting for chest pressure and shortness of breath. The patient reports last night she began experiencing chest pressure on the left side of her chest, initially radiating to the right side of her chest but which is now nonradiating, occurring intermittently 5 mins on/off. She reports the episodes have been continual since last night, not related to exertion, and have been associated with heaviness of breathing which occurs with each episode. Denies lightheadedness but reports chronic dizziness which she attributes to calcium acetate started in lieu of calcitriol (patient does not know why it was switched); denies abdominal pain, nausea, vomiting, diarrhea, blood in the stool or black stool, significant unintended weight loss. She also reports 2 weeks of burning on urination for which she was given an antibiotic with no relief. She lives at home with 3 of her children, all of whom are healthy; she ambulates w/ a cane at baseline, sometimes w/ a walker.    In the ED, T 99.1 F, HR 83, /67; trops 0.04; CXR revealed cardiomegaly; Hb 8.8; UA negative. (20 Jun 2020 18:51)      PAST MEDICAL & SURGICAL HISTORY:  DVT, lower extremity  Rheumatoid arthritis  Diastolic heart failure  Diverticulitis: sigmoid  Gout  Hypertension  Pacemaker  Chronic kidney disease  History of hysterectomy  History of tonsillectomy  History of appendectomy  Artificial pacemaker      FAMILY HISTORY:  FH: arthritis: sister  [x] no pertinent family history of premature cardiovascular disease in first degree relatives.    SOCIAL HISTORY:    [x] Non-smoker  [x] No alcohol use  [x] No illicit drug use    Allergies:  Keflex (Unknown)    REVIEW OF SYSTEMS:  CONSTITUTIONAL: No fever, weight loss, or fatigue  CARDIOLOGY: (+) Chest pain. No palpitations, or syncopal episodes.   RESPIRATORY: (+) SOB No cough, wheezing.   NEUROLOGICAL: No weakness, no focal deficits to report.  GI: No BRBPR, no N,V,diarrhea.    PSYCHIATRY: Normal mood and affect.  HEENT: No nasal discharge, no ecchymosis  SKIN: No ecchymosis, no breakdown  MUSCULOSKELETAL: Full range of motion x4.   EXTREM: No leg swelling or erythema.    PHYSICAL EXAM:  T(C): 37.1 (06-20-20 @ 15:47), Max: 37.3 (06-20-20 @ 11:34)  HR: 79 (06-20-20 @ 15:47) (79 - 83)  BP: 122/74 (06-20-20 @ 15:47) (122/74 - 140/67)  RR: 18 (06-20-20 @ 15:47) (18 - 18)  SpO2: 97% (06-20-20 @ 15:47) (97% - 98%)  Wt(kg): --  I&O's Summary      General Appearance: NAD, normal for age and gender. 	  Neck: Normal JVP, no bruit.   Eyes: No xanthomalasia, Extra Ocular muscles intact.   Cardiovascular: Regular rate and rhythm S1 S2, No JVD, No murmurs.  Respiratory: Lungs clear to auscultation. No wheezes, rales or rhonchi.  Psychiatry: Alert and oriented x 3, Mood & affect appropriate  Gastrointestinal:  Soft, Non-tender  Skin/Integumen: No rashes, No ecchymoses, No cyanosis	  Neurologic: Non-focal deficits.  Musculoskeletal/ extremities: Normal range of motion, No clubbing, cyanosis or edema  Vascular: Peripheral pulses palpable 2+ bilaterally    LABS:	 	                        8.8    9.96  )-----------( 176      ( 20 Jun 2020 12:25 )             28.6     06-20    144  |  105  |  36<H>  ----------------------------<  145<H>  3.4<L>   |  27  |  2.2<H>    Ca    8.6      20 Jun 2020 12:25      CARDIAC MARKERS ( 20 Jun 2020 18:25 )  x     / 0.04 ng/mL / x     / x     / x      CARDIAC MARKERS ( 20 Jun 2020 12:25 )  x     / 0.04 ng/mL / x     / x     / x        TELEMETRY EVENTS: 	    ECG:  A-sensed V-paced no ischemic changes	  RADIOLOGY: < from: Xray Chest 1 View AP/PA (06.20.20 @ 14:13) >  No radiographic evidence of acute cardiopulmonarydisease.    OTHER: 	    PREVIOUS DIAGNOSTIC TESTING:    [x] Echocardiogram: < from: Transthoracic Echocardiogram (02.09.20 @ 15:28) >  Summary:   1. Left ventricular ejection fraction, by visual estimation, is 40 to 45%.   2. Mildly increased LV wall thickness.   3. Spectral Doppler shows impaired relaxation pattern of left ventricular myocardial filling (Grade I diastolic dysfunction).   4. Mild mitral valve regurgitation.    [x] Catheterization: 1/10/2014  LM normal  LAD mild disease  Circ normal  RCA luminal irregularities  	  Home Medications:  apixaban 2.5 mg oral tablet: 1 tab(s) orally 2 times a day (14 Feb 2020 01:41)  aspirin 81 mg oral tablet, chewable: 1 tab(s) orally once a day (19 Feb 2020 15:48)  atorvastatin 40 mg oral tablet: 1 tab(s) orally once a day (20 Jun 2020 18:43)  calcium acetate 667 mg oral tablet: 1 tab(s) orally once a day (20 Jun 2020 18:44)  folic acid 1 mg oral tablet: 1 tab(s) orally once a day (20 Jun 2020 18:47)  Lasix 20 mg oral tablet: 1 tab(s) orally Tuesday, Thursday, Saturday (20 Jun 2020 18:45)  Lasix 20 mg oral tablet: 1 tab(s) orally once a week (20 Jun 2020 18:46)  latanoprost 0.005% ophthalmic solution: 1 drop(s) to each affected eye once a day (in the evening) (20 Jun 2020 18:48)  metoprolol succinate 25 mg oral tablet, extended release: 1 tab(s) orally once a day (14 Feb 2020 01:41)  sertraline 50 mg oral tablet: 1 tab(s) orally once a day (14 Feb 2020 01:41)  Systane Ultra ophthalmic solution: 1 drop(s) to each affected eye once a day (20 Jun 2020 18:48)  timolol maleate 0.5% ophthalmic solution: 1 drop(s) to each affected eye 2 times a day (20 Jun 2020 18:47)    MEDICATIONS  (STANDING):  apixaban 2.5 milliGRAM(s) Oral every 12 hours  aspirin  chewable 81 milliGRAM(s) Oral daily  atorvastatin Oral Tab/Cap - Peds 40 milliGRAM(s) Oral daily  calcium acetate 667 milliGRAM(s) Oral daily  chlorhexidine 4% Liquid 1 Application(s) Topical <User Schedule>  folic acid 1 milliGRAM(s) Oral daily  furosemide    Tablet 20 milliGRAM(s) Oral daily  latanoprost 0.005% Ophthalmic Solution 1 Drop(s) Both EYES at bedtime  metoprolol succinate ER 25 milliGRAM(s) Oral daily  pantoprazole    Tablet 40 milliGRAM(s) Oral before breakfast  polyvinyl alcohol 1.4%/povidone 0.6% Ophthalmic Solution - Peds 1 Drop(s) Both EYES daily  potassium chloride    Tablet ER 20 milliEquivalent(s) Oral every 2 hours  sertraline 50 milliGRAM(s) Oral daily  timolol 0.5% Solution 1 Drop(s) Both EYES two times a day    MEDICATIONS  (PRN): Date of Admission: 06-20-20    CHIEF COMPLAINT: Patient is a 84y old  Female who presents with a chief complaint of shortness of breath, troponinemia (20 Jun 2020 18:51)      HPI:  The patient is an 84 year-old female with a PMH of DVT/PE (on Eliquis), HTN, early glaucoma, DLD, HFpEF (last EF 53% 2/2020), s/p PPM, CKD III/IV, gout, multiple caths (neg), stress test (neg per patient), rheumatoid arthritis, presenting for chest pressure and shortness of breath. The patient reports last night she began experiencing chest pressure on the left side of her chest, initially radiating to the right side of her chest but which is now nonradiating, occurring intermittently 5 mins on/off. She reports the episodes have been continual since last night, not related to exertion, and have been associated with heaviness of breathing which occurs with each episode. Denies lightheadedness but reports chronic dizziness which she attributes to calcium acetate started in lieu of calcitriol (patient does not know why it was switched); denies abdominal pain, nausea, vomiting, diarrhea, blood in the stool or black stool, significant unintended weight loss. She also reports 2 weeks of burning on urination for which she was given an antibiotic with no relief. She lives at home with 3 of her children, all of whom are healthy; she ambulates w/ a cane at baseline, sometimes w/ a walker.    In the ED, T 99.1 F, HR 83, /67; trops 0.04; CXR revealed cardiomegaly; Hb 8.8; UA negative. (20 Jun 2020 18:51)      PAST MEDICAL & SURGICAL HISTORY:  DVT, lower extremity  Rheumatoid arthritis  Diastolic heart failure  Diverticulitis: sigmoid  Gout  Hypertension  Pacemaker  Chronic kidney disease  History of hysterectomy  History of tonsillectomy  History of appendectomy  Artificial pacemaker      FAMILY HISTORY:  FH: arthritis: sister  [x] no pertinent family history of premature cardiovascular disease in first degree relatives.    SOCIAL HISTORY:    [x] Non-smoker  [x] No alcohol use  [x] No illicit drug use    Allergies:  Keflex (Unknown)    REVIEW OF SYSTEMS:  CONSTITUTIONAL: No fever, weight loss, or fatigue  CARDIOLOGY: (+) Chest pain. No palpitations, or syncopal episodes.   RESPIRATORY: (+) SOB No cough, wheezing.   NEUROLOGICAL: No weakness, no focal deficits to report.  GI: No BRBPR, no N,V,diarrhea.    PSYCHIATRY: Normal mood and affect.  HEENT: No nasal discharge, no ecchymosis  SKIN: No ecchymosis, no breakdown  MUSCULOSKELETAL: Full range of motion x4.   EXTREM: No leg swelling or erythema.    PHYSICAL EXAM:  T(C): 37.1 (06-20-20 @ 15:47), Max: 37.3 (06-20-20 @ 11:34)  HR: 79 (06-20-20 @ 15:47) (79 - 83)  BP: 122/74 (06-20-20 @ 15:47) (122/74 - 140/67)  RR: 18 (06-20-20 @ 15:47) (18 - 18)  SpO2: 97% (06-20-20 @ 15:47) (97% - 98%)  Wt(kg): --  I&O's Summary      General Appearance: NAD, normal for age and gender. 	  Neck: Normal JVP, no bruit.   Eyes: No xanthomalasia, Extra Ocular muscles intact.   Cardiovascular: Regular rate and rhythm S1 S2, No JVD, No murmurs. Tenderness along left breast lower border.  Respiratory: Lungs clear to auscultation. No wheezes, rales or rhonchi.  Psychiatry: Alert and oriented x 3, Mood & affect appropriate  Gastrointestinal:  Soft, Non-tender  Skin/Integumen: No rashes, No ecchymoses, No cyanosis	  Neurologic: Non-focal deficits.  Musculoskeletal/ extremities: Kyphosis, Normal range of motion, No clubbing, cyanosis or edema. Joint enlargement in bilateral knee and bilateral fingers/hands.  Vascular: Peripheral pulses palpable bilaterally    LABS:	 	                        8.8    9.96  )-----------( 176      ( 20 Jun 2020 12:25 )             28.6     06-20    144  |  105  |  36<H>  ----------------------------<  145<H>  3.4<L>   |  27  |  2.2<H>    Ca    8.6      20 Jun 2020 12:25      CARDIAC MARKERS ( 20 Jun 2020 18:25 )  x     / 0.04 ng/mL / x     / x     / x      CARDIAC MARKERS ( 20 Jun 2020 12:25 )  x     / 0.04 ng/mL / x     / x     / x        TELEMETRY EVENTS: 	    ECG:  A-sensed V-paced no ischemic changes	  RADIOLOGY: < from: Xray Chest 1 View AP/PA (06.20.20 @ 14:13) >  No radiographic evidence of acute cardiopulmonarydisease.    OTHER: 	    PREVIOUS DIAGNOSTIC TESTING:    [x] Echocardiogram: < from: Transthoracic Echocardiogram (02.09.20 @ 15:28) >  Summary:   1. Left ventricular ejection fraction, by visual estimation, is 40 to 45%.   2. Mildly increased LV wall thickness.   3. Spectral Doppler shows impaired relaxation pattern of left ventricular myocardial filling (Grade I diastolic dysfunction).   4. Mild mitral valve regurgitation.    [x] Catheterization: 1/10/2014  LM normal  LAD mild disease  Circ normal  RCA luminal irregularities  	  Home Medications:  apixaban 2.5 mg oral tablet: 1 tab(s) orally 2 times a day (14 Feb 2020 01:41)  aspirin 81 mg oral tablet, chewable: 1 tab(s) orally once a day (19 Feb 2020 15:48)  atorvastatin 40 mg oral tablet: 1 tab(s) orally once a day (20 Jun 2020 18:43)  calcium acetate 667 mg oral tablet: 1 tab(s) orally once a day (20 Jun 2020 18:44)  folic acid 1 mg oral tablet: 1 tab(s) orally once a day (20 Jun 2020 18:47)  Lasix 20 mg oral tablet: 1 tab(s) orally Tuesday, Thursday, Saturday (20 Jun 2020 18:45)  Lasix 20 mg oral tablet: 1 tab(s) orally once a week (20 Jun 2020 18:46)  latanoprost 0.005% ophthalmic solution: 1 drop(s) to each affected eye once a day (in the evening) (20 Jun 2020 18:48)  metoprolol succinate 25 mg oral tablet, extended release: 1 tab(s) orally once a day (14 Feb 2020 01:41)  sertraline 50 mg oral tablet: 1 tab(s) orally once a day (14 Feb 2020 01:41)  Systane Ultra ophthalmic solution: 1 drop(s) to each affected eye once a day (20 Jun 2020 18:48)  timolol maleate 0.5% ophthalmic solution: 1 drop(s) to each affected eye 2 times a day (20 Jun 2020 18:47)    MEDICATIONS  (STANDING):  apixaban 2.5 milliGRAM(s) Oral every 12 hours  aspirin  chewable 81 milliGRAM(s) Oral daily  atorvastatin Oral Tab/Cap - Peds 40 milliGRAM(s) Oral daily  calcium acetate 667 milliGRAM(s) Oral daily  chlorhexidine 4% Liquid 1 Application(s) Topical <User Schedule>  folic acid 1 milliGRAM(s) Oral daily  furosemide    Tablet 20 milliGRAM(s) Oral daily  latanoprost 0.005% Ophthalmic Solution 1 Drop(s) Both EYES at bedtime  metoprolol succinate ER 25 milliGRAM(s) Oral daily  pantoprazole    Tablet 40 milliGRAM(s) Oral before breakfast  polyvinyl alcohol 1.4%/povidone 0.6% Ophthalmic Solution - Peds 1 Drop(s) Both EYES daily  potassium chloride    Tablet ER 20 milliEquivalent(s) Oral every 2 hours  sertraline 50 milliGRAM(s) Oral daily  timolol 0.5% Solution 1 Drop(s) Both EYES two times a day    MEDICATIONS  (PRN):

## 2025-04-27 NOTE — ED ADULT TRIAGE NOTE - ACCOMPANIED BY
Complete entire course of antibiotic as directed  Take Aleve up to 2 times daily for pain  Take Norco as needed for SEVERE pain.  Do not drive or operate machinery after taking as this medication can make you drowsy  Rest, ice, and elevate foot  Follow-up with  primary care provider within 2 to 3 days for reevaluation   EMT/paramedic

## 2025-06-03 NOTE — ED ADULT NURSE NOTE - NSSEPSISSUSPECTED_ED_A_ED
Name of Medication(s) Requested:  Requested Prescriptions     Pending Prescriptions Disp Refills    lisinopril (PRINIVIL;ZESTRIL) 2.5 MG tablet 30 tablet 2     Sig: Take 1 tablet by mouth daily       Medication is on current medication list Yes    Dosage and directions were verified? Yes    Quantity verified: 30 day supply     Pharmacy Verified?  Yes    Last Appointment:  2/24/2025    Future appts:  Future Appointments   Date Time Provider Department Center   6/9/2025  9:00 AM Silvana Guaman MD [facility]   6/16/2025  9:00 AM Castillo Mitchell MD [facility] DEP   11/19/2025 11:00 AM Chey Damon APRN - CNP [facility]        (If no appt send self scheduling link. .[email or web address])  Scheduling request sent?     [] Yes  [x] No    Does patient need updated?  [] Yes  [x] No   No

## 2025-07-23 ENCOUNTER — NON-APPOINTMENT (OUTPATIENT)
Age: 89
End: 2025-07-23

## 2025-07-23 ENCOUNTER — APPOINTMENT (OUTPATIENT)
Dept: CARDIOLOGY | Facility: CLINIC | Age: 89
End: 2025-07-23
Payer: MEDICARE

## 2025-07-23 VITALS — BODY MASS INDEX: 20.24 KG/M2 | HEIGHT: 62 IN | WEIGHT: 110 LBS

## 2025-07-23 VITALS — HEART RATE: 60 BPM | DIASTOLIC BLOOD PRESSURE: 70 MMHG | SYSTOLIC BLOOD PRESSURE: 118 MMHG

## 2025-07-23 DIAGNOSIS — I50.9 HEART FAILURE, UNSPECIFIED: ICD-10-CM

## 2025-07-23 DIAGNOSIS — I42.8 OTHER CARDIOMYOPATHIES: ICD-10-CM

## 2025-07-23 DIAGNOSIS — I10 ESSENTIAL (PRIMARY) HYPERTENSION: ICD-10-CM

## 2025-07-23 DIAGNOSIS — I42.9 CARDIOMYOPATHY, UNSPECIFIED: ICD-10-CM

## 2025-07-23 DIAGNOSIS — I65.29 OCCLUSION AND STENOSIS OF UNSPECIFIED CAROTID ARTERY: ICD-10-CM

## 2025-07-23 DIAGNOSIS — I82.409 ACUTE EMBOLISM AND THROMBOSIS OF UNSPECIFIED DEEP VEINS OF UNSPECIFIED LOWER EXTREMITY: ICD-10-CM

## 2025-07-23 PROCEDURE — 93000 ELECTROCARDIOGRAM COMPLETE: CPT

## 2025-07-23 PROCEDURE — 99214 OFFICE O/P EST MOD 30 MIN: CPT

## 2025-07-23 RX ORDER — CALCIUM ACETATE 667 MG
667 TABLET ORAL
Refills: 0 | Status: ACTIVE | COMMUNITY
Start: 2025-07-23

## 2025-07-30 NOTE — PROGRESS NOTE ADULT - SUBJECTIVE AND OBJECTIVE BOX
RACHEL SUMMERS  89y  [facility]-N 4A 022 B      Patient is a 89y old  Female who presents with a chief complaint of chest pain/sob (14 Sep 2024 08:25)      INTERVAL HPI/OVERNIGHT EVENTS:        REVIEW OF SYSTEMS:        FAMILY HISTORY:  FH: arthritis  sister      T(C): 37.2 (09-14-24 @ 06:44), Max: 37.2 (09-14-24 @ 06:44)  HR: 81 (09-14-24 @ 06:44) (72 - 81)  BP: 116/93 (09-14-24 @ 06:44) (116/93 - 153/78)  RR: 18 (09-14-24 @ 06:44) (18 - 18)  SpO2: 96% (09-13-24 @ 14:00) (92% - 96%)  Wt(kg): --Vital Signs Last 24 Hrs  T(C): 37.2 (14 Sep 2024 06:44), Max: 37.2 (14 Sep 2024 06:44)  T(F): 99 (14 Sep 2024 06:44), Max: 99 (14 Sep 2024 06:44)  HR: 81 (14 Sep 2024 06:44) (72 - 81)  BP: 116/93 (14 Sep 2024 06:44) (116/93 - 153/78)  BP(mean): --  RR: 18 (14 Sep 2024 06:44) (18 - 18)  SpO2: 96% (13 Sep 2024 14:00) (92% - 96%)    Parameters below as of 13 Sep 2024 14:00  Patient On (Oxygen Delivery Method): room air        PHYSICAL EXAM:  GENERAL: NAD, well-groomed, well-developed  HEAD:  Atraumatic, Normocephalic  EYES: EOMI, PERRLA, conjunctiva and sclera clear  ENMT: No tonsillar erythema, exudates, or enlargement; Moist mucous membranes, Good dentition, No lesions  NECK: Supple, No JVD, Normal thyroid  NERVOUS SYSTEM:  Alert & Oriented X3, Good concentration; Motor Strength 5/5 B/L upper and lower extremities; DTRs 2+ intact and symmetric  PULM: Clear to auscultation bilaterally  CARDIAC: Regular rate and rhythm; No murmurs, rubs, or gallops  GI: Soft, Nontender, Nondistended; Bowel sounds present  EXTREMITIES:  2+ Peripheral Pulses, No clubbing, cyanosis, or edema  LYMPH: No lymphadenopathy noted  SKIN: No rashes or lesions    Consultant(s) Notes Reviewed:  [x ] YES  [ ] NO  Care Discussed with Consultants/Other Providers [ x] YES  [ ] NO    LABS:                            11.3   8.80  )-----------( 220      ( 14 Sep 2024 08:37 )             35.7   09-13    135  |  95<L>  |  31<H>  ----------------------------<  85  4.8   |  29  |  4.0<H>    Ca    8.5      13 Sep 2024 07:11  Phos  3.2     09-13  Mg     2.1     09-13    TPro  6.0  /  Alb  3.3<L>  /  TBili  0.6  /  DBili  x   /  AST  36  /  ALT  18  /  AlkPhos  170<H>  09-13            Culture - Blood (collected 12 Sep 2024 17:10)  Source: .Blood Blood-Peripheral  Gram Stain (13 Sep 2024 16:06):    Growth in aerobic bottle: Gram Positive Cocci in Clusters  Preliminary Report (13 Sep 2024 16:06):    Growth in aerobic bottle: Gram Positive Cocci in Clusters    Direct identification is available within approximately 3-5    hours either by Blood Panel Multiplexed PCR or Direct    MALDI-TOF. Details: [email or web address]  Organism: Blood Culture PCR (13 Sep 2024 18:04)  Organism: Blood Culture PCR (13 Sep 2024 18:04)    Culture - Blood (collected 12 Sep 2024 17:10)  Source: .Blood Blood-Peripheral  Preliminary Report (13 Sep 2024 23:02):    No growth at 24 hours      acetaminophen     Tablet .. 650 milliGRAM(s) Oral once  apixaban 2.5 milliGRAM(s) Oral two times a day  atorvastatin 20 milliGRAM(s) Oral at bedtime  aztreonam  IVPB 2000 milliGRAM(s) IV Intermittent <User Schedule>  chlorhexidine 2% Cloths 1 Application(s) Topical daily  folic acid 1 milliGRAM(s) Oral daily  metoprolol succinate  milliGRAM(s) Oral daily  sacubitril 49 mG/valsartan 51 mG 1 Tablet(s) Oral two times a day  sertraline 25 milliGRAM(s) Oral daily  timolol 0.5% Solution 1 Drop(s) Both EYES two times a day    89-year-old female, past medical history hypertension, DVT/PE, heart failure, AICD in place, RA, end-stage renal disease on hemodialysis gets dialysis Tuesday Thursday Saturday presents emergency department from dialysis for acute shortness of breath.  Patient reports received 30 minutes of dialysis and started to feel acutely short of breath.  In ER vital signs significant for tachycardia and fever.  Patient reports breathing feels better at this time.  Patient reports still makes small amount of urine.  Notes that has been burning her over the last several days.. Denies chest pain, abdominal pain, nausea vomiting diarrhea, hematuria, back pain, headache, neck pain.      1.  Acute Hypoxemic Respiratory Failure and sepsis seems multifactorial due to acute UTI  and acute pulmonary edema   # SIRS on admission vs sepsis ( pt endorses LUTS)   * acute sob and chest pain while dialysis, increase oxygen requirement (currently on 3L), euvolemic on examination  *  ED vitals: BP: 176/106, , Temp 101.2, RR 18 , labs showed troponin 89>91,  - Admit to medicine                              - CXR: Cardiomegaly              - LA: nl        -RVP : negative   - Cont broad spectrum abs   - Blood cx:  pending             - urine leg:pending             - urine strep: pending            - MRSA:pending           - Urine cx: pending  - pacing device, no intraabdominal pathology, high suspicion of    - Trop trend  - AST elevated   - CTAP:  Bilateral atelectasis and pleural effusions. Cardiomegaly.       2. ESRD on HD TTS via TDC ( NO AV fistula)   -HD per nephro  - catheter site non-erythematous  - Nephro consult appreciated     3. HTN/ HFrEF 25-30%/HO PE/DVT. H/o NSVT   Echo: (7/23)  1. Normal left ventricular internal cavity size.   2. Severely decreased global left ventricular systolic function.   3. Left ventricular ejection fraction, by visual estimation, is 25 to   30%.  -c/w Entresto   -c/w atorvastatin 20 mg qhs  -c/w metoprolol  mg qd  -c/w Eliquis 2.5 mg bid  -Not in overload, monitor and reassess in morning  -f/u on Na patient 128, gene hypervolemic, f/u on Na in the AM post dialysis session      DVT prophylaxis: Eliquis  GI prophylaxis: PPI  Diet: renal  Code status: DNR/DNI   Activity: IAT PT  Dispo: acute    pt acute hypoxia might be due to pulmonary edema. lasix *1. Nephro following  sepsis seems due to uti. reporting burning pain.    Macdonald as FYI    Encounter closed     RACHEL SUMMERS  89y  [facility]-N 4A 022 B      Patient is a 89y old  Female who presents with a chief complaint of chest pain/sob (14 Sep 2024 08:25)      INTERVAL HPI/OVERNIGHT EVENTS:    Patient looks comfortable  reporting right upper quadrant abdominal pain   also reporting sob despite not requiring oxygen   going for dialysis today         FAMILY HISTORY:  FH: arthritis  sister      T(C): 37.2 (09-14-24 @ 06:44), Max: 37.2 (09-14-24 @ 06:44)  HR: 81 (09-14-24 @ 06:44) (72 - 81)  BP: 116/93 (09-14-24 @ 06:44) (116/93 - 153/78)  RR: 18 (09-14-24 @ 06:44) (18 - 18)  SpO2: 96% (09-13-24 @ 14:00) (92% - 96%)  Wt(kg): --Vital Signs Last 24 Hrs  T(C): 37.2 (14 Sep 2024 06:44), Max: 37.2 (14 Sep 2024 06:44)  T(F): 99 (14 Sep 2024 06:44), Max: 99 (14 Sep 2024 06:44)  HR: 81 (14 Sep 2024 06:44) (72 - 81)  BP: 116/93 (14 Sep 2024 06:44) (116/93 - 153/78)  BP(mean): --  RR: 18 (14 Sep 2024 06:44) (18 - 18)  SpO2: 96% (13 Sep 2024 14:00) (92% - 96%)    Parameters below as of 13 Sep 2024 14:00  Patient On (Oxygen Delivery Method): room air        PHYSICAL EXAM:  GENERAL: NAD, well-groomed, well-developed  PULM: Clear to auscultation bilaterally  CARDIAC: Regular rate and rhythm;  GI: Soft, Nontender, Nondistended; Bowel sounds present  EXTREMITIES:  2+ Peripheral Pulses    Consultant(s) Notes Reviewed:  [x ] YES  [ ] NO  Care Discussed with Consultants/Other Providers [ x] YES  [ ] NO    LABS:                            11.3   8.80  )-----------( 220      ( 14 Sep 2024 08:37 )             35.7   09-13    135  |  95<L>  |  31<H>  ----------------------------<  85  4.8   |  29  |  4.0<H>    Ca    8.5      13 Sep 2024 07:11  Phos  3.2     09-13  Mg     2.1     09-13    TPro  6.0  /  Alb  3.3<L>  /  TBili  0.6  /  DBili  x   /  AST  36  /  ALT  18  /  AlkPhos  170<H>  09-13            Culture - Blood (collected 12 Sep 2024 17:10)  Source: .Blood Blood-Peripheral  Gram Stain (13 Sep 2024 16:06):    Growth in aerobic bottle: Gram Positive Cocci in Clusters  Preliminary Report (13 Sep 2024 16:06):    Growth in aerobic bottle: Gram Positive Cocci in Clusters    Direct identification is available within approximately 3-5    hours either by Blood Panel Multiplexed PCR or Direct    MALDI-TOF. Details: [email or web address]  Organism: Blood Culture PCR (13 Sep 2024 18:04)  Organism: Blood Culture PCR (13 Sep 2024 18:04)    Culture - Blood (collected 12 Sep 2024 17:10)  Source: .Blood Blood-Peripheral  Preliminary Report (13 Sep 2024 23:02):    No growth at 24 hours      acetaminophen     Tablet .. 650 milliGRAM(s) Oral once  apixaban 2.5 milliGRAM(s) Oral two times a day  atorvastatin 20 milliGRAM(s) Oral at bedtime  aztreonam  IVPB 2000 milliGRAM(s) IV Intermittent <User Schedule>  chlorhexidine 2% Cloths 1 Application(s) Topical daily  folic acid 1 milliGRAM(s) Oral daily  metoprolol succinate  milliGRAM(s) Oral daily  sacubitril 49 mG/valsartan 51 mG 1 Tablet(s) Oral two times a day  sertraline 25 milliGRAM(s) Oral daily  timolol 0.5% Solution 1 Drop(s) Both EYES two times a day    89-year-old female, past medical history hypertension, DVT/PE, heart failure, AICD in place, RA, end-stage renal disease on hemodialysis gets dialysis Tuesday Thursday Saturday presents emergency department from dialysis for acute shortness of breath.  Patient reports received 30 minutes of dialysis and started to feel acutely short of breath.  In ER vital signs significant for tachycardia and fever.  Patient reports breathing feels better at this time.  Patient reports still makes small amount of urine.  Notes that has been burning her over the last several days.. Denies chest pain, abdominal pain, nausea vomiting diarrhea, hematuria, back pain, headache, neck pain.      1. sepsis seems multifactorial due to acute UTI and staph epidermis bacteremia   * acute sob and chest pain while dialysis, increase oxygen requirement (currently on 3L), euvolemic on examination  *  ED vitals: BP: 176/106, , Temp 101.2, RR 18 , labs showed troponin 89>91,  - Admit to medicine                              - CXR: Cardiomegaly              - LA: nl        -RVP : negative   - Cont Aztreonam awaiting urine cx  - Received vancomycin upon admission. pre dialysis trough:11. Vancomycin 1250mg on 09/14/2024. (dose increased by 250mg)   - Blood cx:Staph epidermis. Repeat blood cx:pending            - urine leg:neg           - urine strep: neg             - MRSA:neg       - Urine cx: pending  - pacing device, no intraabdominal pathology, high suspicion of    - Trop trend  - AST elevated   - CTAP:  Bilateral atelectasis and pleural effusions. Cardiomegaly.   - US abdomen :pending  - ID consult appreciated     2. Acute hypoxemic resp failure due to acute pulmonary edema in a pt ESRD on HD TTS via TDC ( NO AV fistula)   - catheter site non-erythematous  - Nephro consult appreciated     3. HTN/ HFrEF 25-30%/HO PE/DVT. H/o NSVT   Echo: (7/23)  1. Normal left ventricular internal cavity size.   2. Severely decreased global left ventricular systolic function.   3. Left ventricular ejection fraction, by visual estimation, is 25 to   30%.  -c/w Entresto   -c/w atorvastatin 20 mg qhs  -c/w metoprolol  mg qd  -c/w Eliquis 2.5 mg bid  -Not in overload, monitor and reassess in morning  -f/u on Na patient 128gene hypervolemic, f/u on Na in the AM post dialysis session      DVT prophylaxis: Eliquis  GI prophylaxis: PPI  Diet: renal  Code status: DNR/DNI   Activity: IAT PT  Dispo: acute

## 2025-08-01 ENCOUNTER — APPOINTMENT (OUTPATIENT)
Facility: CLINIC | Age: 89
End: 2025-08-01